# Patient Record
Sex: MALE | Race: BLACK OR AFRICAN AMERICAN | Employment: OTHER | URBAN - NONMETROPOLITAN AREA
[De-identification: names, ages, dates, MRNs, and addresses within clinical notes are randomized per-mention and may not be internally consistent; named-entity substitution may affect disease eponyms.]

---

## 2018-07-19 ENCOUNTER — OFFICE VISIT (OUTPATIENT)
Dept: VASCULAR SURGERY | Age: 63
End: 2018-07-19
Payer: COMMERCIAL

## 2018-07-19 ENCOUNTER — HOSPITAL ENCOUNTER (OUTPATIENT)
Dept: NON INVASIVE DIAGNOSTICS | Age: 63
Discharge: HOME | End: 2018-07-19
Payer: MEDICARE

## 2018-07-19 VITALS
SYSTOLIC BLOOD PRESSURE: 143 MMHG | RESPIRATION RATE: 18 BRPM | DIASTOLIC BLOOD PRESSURE: 79 MMHG | HEART RATE: 76 BPM | TEMPERATURE: 96.5 F

## 2018-07-19 DIAGNOSIS — I73.9 PVD (PERIPHERAL VASCULAR DISEASE) (HCC): Primary | ICD-10-CM

## 2018-07-19 DIAGNOSIS — I73.9 PVD (PERIPHERAL VASCULAR DISEASE) (HCC): ICD-10-CM

## 2018-07-19 DIAGNOSIS — T14.8XXA OPEN WOUND: Primary | ICD-10-CM

## 2018-07-19 PROCEDURE — 93923 UPR/LXTR ART STDY 3+ LVLS: CPT

## 2018-07-19 PROCEDURE — 99203 OFFICE O/P NEW LOW 30 MIN: CPT | Performed by: PHYSICIAN ASSISTANT

## 2018-07-19 RX ORDER — OMEPRAZOLE 20 MG/1
20 CAPSULE, DELAYED RELEASE ORAL DAILY
COMMUNITY

## 2018-07-19 RX ORDER — CIPROFLOXACIN 500 MG/1
500 TABLET, FILM COATED ORAL 2 TIMES DAILY
COMMUNITY
End: 2018-11-28 | Stop reason: CLARIF

## 2018-07-19 RX ORDER — POLYETHYLENE GLYCOL 3350 17 G/17G
17 POWDER, FOR SOLUTION ORAL DAILY
COMMUNITY

## 2018-07-19 RX ORDER — GABAPENTIN 100 MG/1
800 CAPSULE ORAL 3 TIMES DAILY
COMMUNITY

## 2018-07-19 RX ORDER — HYDROCODONE BITARTRATE AND ACETAMINOPHEN 10; 325 MG/1; MG/1
1 TABLET ORAL EVERY 6 HOURS PRN
COMMUNITY

## 2018-07-19 RX ORDER — IBUPROFEN 800 MG/1
800 TABLET ORAL EVERY 6 HOURS PRN
COMMUNITY
End: 2018-11-28 | Stop reason: CLARIF

## 2018-07-19 RX ORDER — SULFAMETHOXAZOLE AND TRIMETHOPRIM 800; 160 MG/1; MG/1
1 TABLET ORAL 2 TIMES DAILY
COMMUNITY
End: 2018-07-31 | Stop reason: ALTCHOICE

## 2018-07-19 RX ORDER — METRONIDAZOLE 500 MG/1
500 TABLET ORAL 3 TIMES DAILY
COMMUNITY
End: 2018-07-31 | Stop reason: ALTCHOICE

## 2018-07-19 RX ORDER — TIZANIDINE 4 MG/1
4 TABLET ORAL EVERY 6 HOURS PRN
COMMUNITY

## 2018-07-19 RX ORDER — DOXYCYCLINE HYCLATE 100 MG/1
100 CAPSULE ORAL 2 TIMES DAILY
COMMUNITY
End: 2018-07-31 | Stop reason: ALTCHOICE

## 2018-07-19 NOTE — PROGRESS NOTES
Patient Care Team:  Kelsea Valladares as PCP - General (Podiatry)      History and Physical    Mr. Kaveh Mckeon is a 59 yo male who was referred to us today by Dr. Jaqueline Acuna for evaluation of PVD. He has a history of DM, hyperlipidemia and HTN. He reports that Dr. Xochitl Arriaga removed his right great toe yesterday. He reports that for some time before this the nail had been lifting and had pus like drainage form under it. He reports pain in the right great toe. Past smoker and still occasionally smokes. Old records have been obtained, reviewed, and summarized. Abram Goldberg is a 58 y.o. male with the following [facility]: There are no active problems to display for this patient. Current Outpatient Prescriptions   Medication Sig Dispense Refill    omeprazole (PRILOSEC) 20 MG delayed release capsule Take 20 mg by mouth daily      HYDROcodone-acetaminophen (NORCO)  MG per tablet Take 1 tablet by mouth every 6 hours as needed for Pain. Patricia Calderon gabapentin (NEURONTIN) 100 MG capsule Take 800 mg by mouth 3 times daily. Lo Mccarty  tiZANidine (ZANAFLEX) 4 MG tablet Take 4 mg by mouth every 6 hours as needed      polyethylene glycol (GLYCOLAX) powder Take 17 g by mouth daily      sulfamethoxazole-trimethoprim (BACTRIM DS;SEPTRA DS) 800-160 MG per tablet Take 1 tablet by mouth 2 times daily      metroNIDAZOLE (FLAGYL) 500 MG tablet Take 500 mg by mouth 3 times daily      ciprofloxacin (CIPRO) 500 MG tablet Take 500 mg by mouth 2 times daily      ibuprofen (ADVIL;MOTRIN) 800 MG tablet Take 800 mg by mouth every 6 hours as needed for Pain      doxycycline hyclate (VIBRAMYCIN) 100 MG capsule Take 100 mg by mouth 2 times daily       No current facility-administered medications for this visit. Allergies: Patient has no known allergies.   Past Medical History:   Diagnosis Date    Acid reflux     Anxiety     Diabetes ([address]     Erectile dysfunction     Hyperlipidemia     Hypertension

## 2018-07-27 ENCOUNTER — TELEPHONE (OUTPATIENT)
Dept: VASCULAR SURGERY | Age: 63
End: 2018-07-27

## 2018-07-27 RX ORDER — INSULIN GLARGINE 100 [IU]/ML
90 INJECTION, SOLUTION SUBCUTANEOUS DAILY
COMMUNITY

## 2018-07-30 ENCOUNTER — PREP FOR PROCEDURE (OUTPATIENT)
Dept: VASCULAR SURGERY | Age: 63
End: 2018-07-30

## 2018-07-30 DIAGNOSIS — I73.9 PVD (PERIPHERAL VASCULAR DISEASE) (HCC): Primary | ICD-10-CM

## 2018-07-30 RX ORDER — ASPIRIN 81 MG/1
81 TABLET ORAL ONCE
Status: CANCELLED | OUTPATIENT
Start: 2018-07-30 | End: 2018-07-30

## 2018-07-30 RX ORDER — SODIUM CHLORIDE 9 MG/ML
INJECTION, SOLUTION INTRAVENOUS CONTINUOUS
Status: CANCELLED | OUTPATIENT
Start: 2018-07-30 | End: 2019-07-30

## 2018-07-30 RX ORDER — SODIUM CHLORIDE 0.9 % (FLUSH) 0.9 %
10 SYRINGE (ML) INJECTION PRN
Status: CANCELLED | OUTPATIENT
Start: 2018-07-30 | End: 2019-07-30

## 2018-07-30 NOTE — H&P
Progress Notes  Encounter Date: 7/19/2018  Ishan Abad PA-C   Vascular Surgery   Expand All Collapse All    []Manual[]Template  []Copied  Patient Care Team:  Sierra Brooks as PCP - General (Podiatry)        History and Physical     Mr. Rena Gonsalez is a 59 yo male who was referred to us today by Dr. Ochoa Franco for evaluation of PVD. He has a history of DM, hyperlipidemia and HTN. He reports that Dr. Duard Sicard removed his right great toe yesterday. He reports that for some time before this the nail had been lifting and had pus like drainage form under it. He reports pain in the right great toe. Past smoker and still occasionally smokes. Patient had a BMP on 6/28/18 which showed his creatinine and GFR were normal.      Old records have been obtained, reviewed, and summarized.     Janelle Rabago is a 58 y.o. male with the following history reviewed and recorded in [facility]: There are no active problems to display for this patient.     Current Facility-Administered Medications          Current Outpatient Prescriptions   Medication Sig Dispense Refill    omeprazole (PRILOSEC) 20 MG delayed release capsule Take 20 mg by mouth daily        HYDROcodone-acetaminophen (NORCO)  MG per tablet Take 1 tablet by mouth every 6 hours as needed for Pain. .        gabapentin (NEURONTIN) 100 MG capsule Take 800 mg by mouth 3 times daily. .        tiZANidine (ZANAFLEX) 4 MG tablet Take 4 mg by mouth every 6 hours as needed        polyethylene glycol (GLYCOLAX) powder Take 17 g by mouth daily        sulfamethoxazole-trimethoprim (BACTRIM DS;SEPTRA DS) 800-160 MG per tablet Take 1 tablet by mouth 2 times daily        metroNIDAZOLE (FLAGYL) 500 MG tablet Take 500 mg by mouth 3 times daily        ciprofloxacin (CIPRO) 500 MG tablet Take 500 mg by mouth 2 times daily        ibuprofen (ADVIL;MOTRIN) 800 MG tablet Take 800 mg by mouth every 6 hours as needed for Pain        doxycycline hyclate (VIBRAMYCIN) 100 MG capsule Take 100 mg by mouth 2 times daily          No current facility-administered medications for this visit.          Allergies: Patient has no known allergies. Past Medical History        Past Medical History:   Diagnosis Date    Acid reflux      Anxiety      Diabetes (HCC)      Erectile dysfunction      Hyperlipidemia      Hypertension      Restless leg syndrome      Sleep apnea       the c-pap went out on him couldnot get another one         Past Surgical History   History reviewed. No pertinent surgical history. Family History         Family History   Problem Relation Age of Onset   [facility] Cancer Mother      Cancer Father                  Social History   Substance Use Topics    Smoking status: Current Some Day Smoker       Packs/day: 3.00    Smokeless tobacco: Never Used         Comment: smokes 3 packs per week    Alcohol use No         Review of Systems     Constitutional - no significant activity change, appetite change, or unexpected weight change. No fever or chills. No diaphoresis or significant fatigue. HENT - no significant rhinorrhea or epistaxis. No tinnitus or significant hearing loss. Eyes - no sudden vision change or amaurosis. Respiratory - no significant shortness of breath, wheezing, or stridor. No apnea, cough, or chest tightness associated with shortness of breath. Cardiovascular - no chest pain, syncope, or significant dizziness. No palpitations or significant leg swelling. Patient reports pain right great toe. Gastrointestinal - no abdominal swelling or pain. No blood in stool. No severe constipation, diarrhea, nausea, or vomiting. Genitourinary - No difficulty urinating, dysuria, frequency, or urgency. No flank pain or hematuria. Musculoskeletal - no back pain, gait disturbance, or myalgia. Skin - no color change, rash, pallor, or new wound. Neurologic - no dizziness, facial asymmetry, or light headedness. No seizures.   No speech difficulty or lateralizing weakness. Hematologic - no easy bruising or excessive bleeding. Psychiatric - no severe anxiety or nervousness. No confusion. All other review of systems are negative.        Physical Exam     BP (!) 143/79 Comment: left  Pulse 76   Temp 96.5 °F (35.8 °C)   Resp 18      Constitutional - well developed, well nourished. No diaphoresis or acute distress. HENT - head normocephalic. Right external ear canal appears normal.  Left external ear canal appears normal.  Septum appears midline. Eyes - conjunctiva normal.  EOMS normal.  No exudate. No icterus. Neck- ROM appears normal, no tracheal deviation. Cardiovascular - Regular rate and rhythm. Heart sounds are normal.  No murmur, rub, or gallop. Carotid pulses are 2+ to palpation bilaterally without bruit. Extremities - Radial and brachial pulses are 2+ to palpation bilaterally. DP and PT pulses are not palpable. No cyanosis, clubbing, or significant edema. No signs atheroembolic event. Pulmonary - effort appears normal.  No respiratory distress. Lungs - Breath sounds normal. No wheezes or rales. GI - Abdomen - soft, non tender, bowel sounds X 4 quadrants. No guarding or rebound tenderness. No distension or palpable mass. Genitourinary - deferred. Musculoskeletal - ROM appears normal.  No significant edema. Neurologic - alert and oriented X 3. Physiologic. Skin - warm, dry, and intact. No rash, erythema, or pallor. Wound right nail bed (nail recently removed)  Psychiatric - mood, affect, and behavior appear normal.  Judgment and thought processes appear normal.     Risk factors for atherosclerosis of all vascular beds have been reviewed with the patient including:  Family history, tobacco abuse in all forms, elevated cholesterol, hyperlipidemia, and diabetes.        Assessment     1. Open wound     2.     Atherosclerosis of native arteries of both lower extremities        Plan     Start ASA EC 81 mg daily  Strongly encourage  statin therapy  Good skin care/checks daily  Recommend no smoking   Continue local wound care per Dr. Kevin Omalley  We will obtain a CAT with toe pressures today        Addendum:  Lower extremity arterial study:    Impression        The patient has severely diminished ankle-brachial indices bilateral lower    extremity(ies) which would be consistent with rest pain symptoms.    Based on segmental pressures and doppler waveforms, the patient likely has    disease in the right superficial femoral, tibial arterial segments.    Based on segmental pressures and doppler waveforms, the patient likely has    disease in the bilateral popliteal, tibial arterial segments.      Right YOHANNES 0.4, toe pressure 0,  Left YOHANNES 0.54, toe pressure, 0.4  Individual films reviewed: Yes. Test results were reviewed with the patient.     LM to call regarding the results of his CAT and further recommendation.     Addendum:  Spoke with the patient via telephone regarding the results of his CAT. Options were discussed with the patient including continued medical management versus proceeding with angiography and potential intervention for PVD with wound. Patient has opted to proceed with angiography.   Risks have been discussed with the patient including but not limited to MI, death, CVA, bleed, nerve injury, infection, contrast nephropathy, possible need for dialysis, and need for further surgery.      Proceed with aortogram with runoff possible angioplasty/atherectomy/stent                 Office Visit on 7/19/2018            Revision History            Detailed Report           Note shared with patient   Progress Notes Info     Author Note Status Last Update User   AlRoxanna Brown PA-C Addendum Jeffry Abad PA-C   Last Update Date/Time: 7/27/2018  2:43 PM   Chart Review Routing History     No Routing History on File

## 2018-07-31 ENCOUNTER — HOSPITAL ENCOUNTER (OUTPATIENT)
Dept: INTERVENTIONAL RADIOLOGY/VASCULAR | Age: 63
Discharge: HOME | End: 2018-07-31
Payer: MEDICARE

## 2018-07-31 VITALS
TEMPERATURE: 98.4 F | HEART RATE: 70 BPM | OXYGEN SATURATION: 96 % | DIASTOLIC BLOOD PRESSURE: 72 MMHG | WEIGHT: 262 LBS | SYSTOLIC BLOOD PRESSURE: 160 MMHG | BODY MASS INDEX: 37.51 KG/M2 | RESPIRATION RATE: 18 BRPM | HEIGHT: 70 IN

## 2018-07-31 DIAGNOSIS — I73.9 PVD (PERIPHERAL VASCULAR DISEASE) (HCC): ICD-10-CM

## 2018-07-31 LAB
ANION GAP SERPL CALCULATED.3IONS-SCNC: 13 MMOL/L (ref 7–19)
BUN BLDV-MCNC: 14 MG/DL (ref 8–23)
CALCIUM SERPL-MCNC: 8.6 MG/DL (ref 8.8–10.2)
CHLORIDE BLD-SCNC: 105 MMOL/L (ref 98–111)
CO2: 23 MMOL/L (ref 22–29)
CREAT SERPL-MCNC: 0.7 MG/DL (ref 0.5–1.2)
GFR NON-AFRICAN AMERICAN: >60
GLUCOSE BLD-MCNC: 237 MG/DL (ref 74–109)
HCT VFR BLD CALC: 38.6 % (ref 42–52)
HEMOGLOBIN: 13 G/DL (ref 14–18)
MCH RBC QN AUTO: 30.7 PG (ref 27–31)
MCHC RBC AUTO-ENTMCNC: 33.7 G/DL (ref 33–37)
MCV RBC AUTO: 91.3 FL (ref 80–94)
PDW BLD-RTO: 12.4 % (ref 11.5–14.5)
PLATELET # BLD: 179 K/UL (ref 130–400)
PMV BLD AUTO: 10.4 FL (ref 9.4–12.4)
POTASSIUM SERPL-SCNC: 3.6 MMOL/L (ref 3.5–5)
RBC # BLD: 4.23 M/UL (ref 4.7–6.1)
SODIUM BLD-SCNC: 141 MMOL/L (ref 136–145)
WBC # BLD: 9.1 K/UL (ref 4.8–10.8)

## 2018-07-31 PROCEDURE — 6370000000 HC RX 637 (ALT 250 FOR IP): Performed by: PHYSICIAN ASSISTANT

## 2018-07-31 PROCEDURE — 75774 ARTERY X-RAY EACH VESSEL: CPT | Performed by: SURGERY

## 2018-07-31 PROCEDURE — 75625 CONTRAST EXAM ABDOMINL AORTA: CPT | Performed by: SURGERY

## 2018-07-31 PROCEDURE — 36415 COLL VENOUS BLD VENIPUNCTURE: CPT

## 2018-07-31 PROCEDURE — 75716 ARTERY X-RAYS ARMS/LEGS: CPT

## 2018-07-31 PROCEDURE — 6370000000 HC RX 637 (ALT 250 FOR IP): Performed by: SURGERY

## 2018-07-31 PROCEDURE — 6360000002 HC RX W HCPCS: Performed by: SURGERY

## 2018-07-31 PROCEDURE — 80048 BASIC METABOLIC PNL TOTAL CA: CPT

## 2018-07-31 PROCEDURE — 75625 CONTRAST EXAM ABDOMINL AORTA: CPT

## 2018-07-31 PROCEDURE — 99152 MOD SED SAME PHYS/QHP 5/>YRS: CPT

## 2018-07-31 PROCEDURE — 2580000003 HC RX 258: Performed by: PHYSICIAN ASSISTANT

## 2018-07-31 PROCEDURE — 75716 ARTERY X-RAYS ARMS/LEGS: CPT | Performed by: SURGERY

## 2018-07-31 PROCEDURE — 2500000003 HC RX 250 WO HCPCS: Performed by: SURGERY

## 2018-07-31 PROCEDURE — 37226 HC FEMPOP PLASTY & STENT: CPT

## 2018-07-31 PROCEDURE — 6360000004 HC RX CONTRAST MEDICATION: Performed by: SURGERY

## 2018-07-31 PROCEDURE — C1894 INTRO/SHEATH, NON-LASER: HCPCS

## 2018-07-31 PROCEDURE — 99153 MOD SED SAME PHYS/QHP EA: CPT

## 2018-07-31 PROCEDURE — 37226 PR REVSC OPN/PRQ FEM/POP W/STNT/ANGIOP SM VSL: CPT | Performed by: SURGERY

## 2018-07-31 PROCEDURE — 85027 COMPLETE CBC AUTOMATED: CPT

## 2018-07-31 PROCEDURE — 6360000002 HC RX W HCPCS: Performed by: PHYSICIAN ASSISTANT

## 2018-07-31 RX ORDER — ASPIRIN 81 MG/1
81 TABLET ORAL ONCE
Status: COMPLETED | OUTPATIENT
Start: 2018-07-31 | End: 2018-07-31

## 2018-07-31 RX ORDER — ACETAMINOPHEN 325 MG/1
650 TABLET ORAL EVERY 4 HOURS PRN
Status: DISCONTINUED | OUTPATIENT
Start: 2018-07-31 | End: 2018-08-02 | Stop reason: HOSPADM

## 2018-07-31 RX ORDER — CLOPIDOGREL BISULFATE 75 MG/1
75 TABLET ORAL DAILY
Status: DISCONTINUED | OUTPATIENT
Start: 2018-07-31 | End: 2018-08-02 | Stop reason: HOSPADM

## 2018-07-31 RX ORDER — FENTANYL CITRATE 50 UG/ML
INJECTION, SOLUTION INTRAMUSCULAR; INTRAVENOUS
Status: COMPLETED | OUTPATIENT
Start: 2018-07-31 | End: 2018-07-31

## 2018-07-31 RX ORDER — MIDAZOLAM HYDROCHLORIDE 1 MG/ML
INJECTION INTRAMUSCULAR; INTRAVENOUS
Status: COMPLETED | OUTPATIENT
Start: 2018-07-31 | End: 2018-07-31

## 2018-07-31 RX ORDER — HEPARIN SODIUM 5000 [USP'U]/ML
INJECTION, SOLUTION INTRAVENOUS; SUBCUTANEOUS
Status: COMPLETED | OUTPATIENT
Start: 2018-07-31 | End: 2018-07-31

## 2018-07-31 RX ORDER — IODIXANOL 320 MG/ML
INJECTION, SOLUTION INTRAVASCULAR
Status: COMPLETED | OUTPATIENT
Start: 2018-07-31 | End: 2018-07-31

## 2018-07-31 RX ORDER — SODIUM CHLORIDE 9 MG/ML
INJECTION, SOLUTION INTRAVENOUS CONTINUOUS
Status: DISCONTINUED | OUTPATIENT
Start: 2018-07-31 | End: 2018-08-02 | Stop reason: HOSPADM

## 2018-07-31 RX ORDER — LISINOPRIL 10 MG/1
10 TABLET ORAL 2 TIMES DAILY
COMMUNITY

## 2018-07-31 RX ORDER — ONDANSETRON 2 MG/ML
4 INJECTION INTRAMUSCULAR; INTRAVENOUS EVERY 8 HOURS PRN
Status: DISCONTINUED | OUTPATIENT
Start: 2018-07-31 | End: 2018-08-02 | Stop reason: HOSPADM

## 2018-07-31 RX ORDER — SODIUM CHLORIDE 9 MG/ML
INJECTION, SOLUTION INTRAVENOUS CONTINUOUS
Status: DISCONTINUED | OUTPATIENT
Start: 2018-07-31 | End: 2018-07-31 | Stop reason: SDUPTHER

## 2018-07-31 RX ORDER — SODIUM CHLORIDE 0.9 % (FLUSH) 0.9 %
10 SYRINGE (ML) INJECTION PRN
Status: DISCONTINUED | OUTPATIENT
Start: 2018-07-31 | End: 2018-08-02 | Stop reason: HOSPADM

## 2018-07-31 RX ORDER — HYDROCODONE BITARTRATE AND ACETAMINOPHEN 5; 325 MG/1; MG/1
1 TABLET ORAL EVERY 4 HOURS PRN
Status: DISCONTINUED | OUTPATIENT
Start: 2018-07-31 | End: 2018-08-02 | Stop reason: HOSPADM

## 2018-07-31 RX ORDER — HYDROCODONE BITARTRATE AND ACETAMINOPHEN 5; 325 MG/1; MG/1
2 TABLET ORAL EVERY 4 HOURS PRN
Status: DISCONTINUED | OUTPATIENT
Start: 2018-07-31 | End: 2018-08-02 | Stop reason: HOSPADM

## 2018-07-31 RX ORDER — AMLODIPINE BESYLATE 5 MG/1
5 TABLET ORAL DAILY
COMMUNITY

## 2018-07-31 RX ORDER — LIDOCAINE HYDROCHLORIDE 20 MG/ML
INJECTION, SOLUTION INFILTRATION; PERINEURAL
Status: COMPLETED | OUTPATIENT
Start: 2018-07-31 | End: 2018-07-31

## 2018-07-31 RX ADMIN — IODIXANOL 150 ML: 320 INJECTION, SOLUTION INTRAVASCULAR at 09:29

## 2018-07-31 RX ADMIN — FENTANYL CITRATE 25 MCG: 50 INJECTION INTRAMUSCULAR; INTRAVENOUS at 08:22

## 2018-07-31 RX ADMIN — MIDAZOLAM 1 MG: 1 INJECTION INTRAMUSCULAR; INTRAVENOUS at 08:37

## 2018-07-31 RX ADMIN — ASPIRIN 81 MG: 81 TABLET, COATED ORAL at 07:30

## 2018-07-31 RX ADMIN — MIDAZOLAM 1 MG: 1 INJECTION INTRAMUSCULAR; INTRAVENOUS at 08:58

## 2018-07-31 RX ADMIN — SODIUM CHLORIDE: 9 INJECTION, SOLUTION INTRAVENOUS at 07:23

## 2018-07-31 RX ADMIN — MIDAZOLAM 1 MG: 1 INJECTION INTRAMUSCULAR; INTRAVENOUS at 08:22

## 2018-07-31 RX ADMIN — HEPARIN SODIUM 5000 UNITS: 5000 INJECTION, SOLUTION INTRAVENOUS; SUBCUTANEOUS at 08:14

## 2018-07-31 RX ADMIN — FENTANYL CITRATE 25 MCG: 50 INJECTION INTRAMUSCULAR; INTRAVENOUS at 08:14

## 2018-07-31 RX ADMIN — HEPARIN SODIUM 5000 UNITS: 5000 INJECTION, SOLUTION INTRAVENOUS; SUBCUTANEOUS at 08:35

## 2018-07-31 RX ADMIN — LIDOCAINE HYDROCHLORIDE 10 ML: 20 INJECTION, SOLUTION INFILTRATION; PERINEURAL at 08:17

## 2018-07-31 RX ADMIN — MIDAZOLAM 1 MG: 1 INJECTION INTRAMUSCULAR; INTRAVENOUS at 08:13

## 2018-07-31 RX ADMIN — FENTANYL CITRATE 25 MCG: 50 INJECTION INTRAMUSCULAR; INTRAVENOUS at 08:37

## 2018-07-31 RX ADMIN — FENTANYL CITRATE 25 MCG: 50 INJECTION INTRAMUSCULAR; INTRAVENOUS at 08:58

## 2018-07-31 RX ADMIN — Medication 2 G: at 08:01

## 2018-07-31 RX ADMIN — CLOPIDOGREL BISULFATE 75 MG: 75 TABLET ORAL at 14:11

## 2018-07-31 NOTE — H&P (VIEW-ONLY)
100 mg by mouth 2 times daily          No current facility-administered medications for this visit.          Allergies: Patient has no known allergies. Past Medical History        Past Medical History:   Diagnosis Date    Acid reflux      Anxiety      Diabetes (HCC)      Erectile dysfunction      Hyperlipidemia      Hypertension      Restless leg syndrome      Sleep apnea       the c-pap went out on him couldnot get another one         Past Surgical History   History reviewed. No pertinent surgical history. Family History         Family History   Problem Relation Age of Onset   [facility] Cancer Mother      Cancer Father                  Social History   Substance Use Topics    Smoking status: Current Some Day Smoker       Packs/day: 3.00    Smokeless tobacco: Never Used         Comment: smokes 3 packs per week    Alcohol use No         Review of Systems     Constitutional  no significant activity change, appetite change, or unexpected weight change. No fever or chills. No diaphoresis or significant fatigue. HENT  no significant rhinorrhea or epistaxis. No tinnitus or significant hearing loss. Eyes  no sudden vision change or amaurosis. Respiratory  no significant shortness of breath, wheezing, or stridor. No apnea, cough, or chest tightness associated with shortness of breath. Cardiovascular  no chest pain, syncope, or significant dizziness. No palpitations or significant leg swelling. Patient reports pain right great toe. Gastrointestinal  no abdominal swelling or pain. No blood in stool. No severe constipation, diarrhea, nausea, or vomiting. Genitourinary  No difficulty urinating, dysuria, frequency, or urgency. No flank pain or hematuria. Musculoskeletal  no back pain, gait disturbance, or myalgia. Skin  no color change, rash, pallor, or new wound. Neurologic  no dizziness, facial asymmetry, or light headedness. No seizures.   No speech difficulty or lateralizing therapy  Good skin care/checks daily  Recommend no smoking   Continue local wound care per Dr. Ran Drew  We will obtain a CAT with toe pressures today        Addendum:  Lower extremity arterial study:    Impression        The patient has severely diminished ankle-brachial indices bilateral lower    extremity(ies) which would be consistent with rest pain symptoms.    Based on segmental pressures and doppler waveforms, the patient likely has    disease in the right superficial femoral, tibial arterial segments.    Based on segmental pressures and doppler waveforms, the patient likely has    disease in the bilateral popliteal, tibial arterial segments.      Right YOHANNES 0.4, toe pressure 0,  Left YOHANNES 0.54, toe pressure, 0.4  Individual films reviewed: Yes. Test results were reviewed with the patient.     LM to call regarding the results of his CAT and further recommendation.     Addendum:  Spoke with the patient via telephone regarding the results of his CAT. Options were discussed with the patient including continued medical management versus proceeding with angiography and potential intervention for PVD with wound. Patient has opted to proceed with angiography.   Risks have been discussed with the patient including but not limited to MI, death, CVA, bleed, nerve injury, infection, contrast nephropathy, possible need for dialysis, and need for further surgery.      Proceed with aortogram with runoff possible angioplasty/atherectomy/stent                 Office Visit on 7/19/2018            Revision History            Detailed Report           Note shared with patient   Progress Notes Info     Author Note Status Last Update User   AlRoxanna Brown PA-C Addendum Rajesh Abad PA-C   Last Update Date/Time: 7/27/2018  2:43 PM   Chart Review Routing History     No Routing History on File

## 2018-08-01 NOTE — OP NOTE
[facility] [address] [facility]                                 OPERATIVE REPORT    PATIENT NAME: Maria E Carrillo                        :        1955  MED REC NO:   [id number]                              ROOM:  ACCOUNT NO:   [de-identified]                           ADMIT DATE: 2018  PROVIDER:     Freedom Richardson MD    DATE OF PROCEDURE:  2018    PREOPERATIVE DIAGNOSIS:  Ischemic rest pain right lower extremity,  bilateral peripheral vascular disease. POSTOPERATIVE DIAGNOSIS:  Ischemic rest pain right lower extremity,  bilateral peripheral vascular disease. PROCEDURES:  1. Ultrasound-guided access of the left common femoral artery. 2.  Aortoiliac angiography. 3.  Bilateral lower extremity runoff. 4.  Crossing of chronic total occlusion of the right superficial femoral  with balloon angioplasty and stenting with a 6 x 150 Innova stent and  drug-coated balloon of the same size. 5.  Selective cannulation of the popliteal artery and angiography. 6.  Mynx closure, left common femoral artery. 7.  Conscious sedation time is 1 hour. SURGEON:  Freedom Richardson MD    ANESTHESIA:  Conscious sedation, ASA class II, 4 mg of Versed and 100 mcg  of fentanyl were used. FLUOROSCOPY TIME:  22 minutes. CONTRAST:  150 mL of Visipaque. FINDINGS:  Are as follows; infrarenal aorta is normal.  Bilateral renal  arteries are normal.  Bilateral common iliacs and external iliacs are  normal.  Both internal iliacs are fairly small with some disease. Runoff  to the legs is as follows; the right lower extremity, the common femoral,  proximal, superficial and deep femoral are relatively normal.  The deep  femoral does have significant disease within it. There is an occlusion of  the superficial femoral approximately 2 cm past its origin with  reconstitution approximately 10 cm down, it is approximately a 10 cm  occlusion.   Going down the

## 2018-08-15 ENCOUNTER — OFFICE VISIT (OUTPATIENT)
Dept: VASCULAR SURGERY | Age: 63
End: 2018-08-15
Payer: MEDICARE

## 2018-08-15 VITALS
SYSTOLIC BLOOD PRESSURE: 146 MMHG | HEART RATE: 73 BPM | TEMPERATURE: 97.7 F | OXYGEN SATURATION: 97 % | DIASTOLIC BLOOD PRESSURE: 84 MMHG | RESPIRATION RATE: 20 BRPM

## 2018-08-15 DIAGNOSIS — I73.9 PVD (PERIPHERAL VASCULAR DISEASE) (HCC): Primary | ICD-10-CM

## 2018-08-15 PROCEDURE — 4004F PT TOBACCO SCREEN RCVD TLK: CPT | Performed by: PHYSICIAN ASSISTANT

## 2018-08-15 PROCEDURE — G8419 CALC BMI OUT NRM PARAM NOF/U: HCPCS | Performed by: PHYSICIAN ASSISTANT

## 2018-08-15 PROCEDURE — G8427 DOCREV CUR MEDS BY ELIG CLIN: HCPCS | Performed by: PHYSICIAN ASSISTANT

## 2018-08-15 PROCEDURE — 3017F COLORECTAL CA SCREEN DOC REV: CPT | Performed by: PHYSICIAN ASSISTANT

## 2018-08-15 PROCEDURE — 99212 OFFICE O/P EST SF 10 MIN: CPT | Performed by: PHYSICIAN ASSISTANT

## 2018-08-15 NOTE — PROGRESS NOTES
Patient Care Team:  Sandeep Carcamo as PCP - General (Podiatry)    Mr. Christiano Chapman  presents for follow up after an Arteriogram with Crossing of chronic total occlusion of the right superficial femoral with balloon angioplasty and stenting with a 6 x 150 Innova stent and drug-coated balloon of the same size. done on 7/31/18 by Dr. Nico Jose. Procedure was done for peripheral vascular disease with claudication. Post op problems include none. Angiogram complications were none. Post op pain and swelling are minimal.  He reports that he can tell that his right leg already feels better. Sarwat Em is a 58 y.o. male with the [facility]:  Patient Active Problem List    Diagnosis Date Noted    [facility] 07/30/2018     Current Outpatient Prescriptions   Medication Sig Dispense Refill    amLODIPine (NORVASC) 5 MG tablet Take 5 mg by mouth daily      lisinopril (PRINIVIL;ZESTRIL) 10 MG tablet Take 10 mg by mouth 2 times daily      aspirin 81 MG tablet Take 81 mg by mouth daily      insulin lispro (HUMALOG) 100 UNIT/ML injection cartridge Inject 5 Units into the skin 2 times daily as needed for High Blood Sugar      insulin glargine (LANTUS) 100 UNIT/ML injection vial Inject 90 Units into the skin daily       omeprazole (PRILOSEC) 20 MG delayed release capsule Take 20 mg by mouth daily      HYDROcodone-acetaminophen (NORCO)  MG per tablet Take 1 tablet by mouth every 6 hours as needed for Pain. Amrit Hernandez gabapentin (NEURONTIN) 100 MG capsule Take 800 mg by mouth 3 times daily. Cristhian Anthony       tiZANidine (ZANAFLEX) 4 MG tablet Take 4 mg by mouth every 6 hours as needed      polyethylene glycol (GLYCOLAX) powder Take 17 g by mouth daily      ciprofloxacin (CIPRO) 500 MG tablet Take 500 mg by mouth 2 times daily      ibuprofen (ADVIL;MOTRIN) 800 MG tablet Take 800 mg by mouth every 6 hours as needed for Pain       No current facility-administered Sitting, Cuff Size: Large Adult)   Pulse 73   Temp 97.7 °F (36.5 °C)   Resp 20   SpO2 97%     Constitutional  obese, No diaphoresis or acute distress. HENT  head normocephalic. Right external ear canal appears normal.  Left external ear canal appears normal.  Septum appears midline. Eyes  conjunctiva normal.  EOMS normal.  No exudate. No icterus. Neck- ROM appears normal, no tracheal deviation. Cardiovascular  Regular rate and rhythm. Heart sounds are normal.  No murmur, rub, or gallop. Carotid pulses are 2+ to palpation bilaterally without bruit. Extremities - Radial and brachial pulses are 2+ to palpation bilaterally. Femoral pulses are palpable. Right DP and PT bilaterally with 2+ doppler signals present. Left DP with 1+ DS. Feet are warm. No cyanosis, clubbing, or significant edema. No signs atheroembolic event. Left groin without significant ecchymosis or pulsatile mass to suggest pseudoaneurysm, AV fistula, or significant hematoma formation. Pulmonary  effort appears normal.  Breath sounds normal.  No respiratory distress. No wheezes or rales. Abdomen  soft, non tender, bowel sounds X 4 quadrants. No guarding or rebound tenderness. No distension or palpable mass. Genitourinary  deferred. Musculoskeletal  ROM appears normal.  No significant edema. Neurologic  alert and oriented X 3. Physiologic. Skin  warm, dry, and intact. No rash, erythema, or pallor. Psychiatric  mood, affect, and behavior appear normal.  Judgment and thought processes appear normal.    Risk factors for atherosclerosis of all vascular beds have been reviewed with the patient including:  Family history, tobacco abuse in all forms, elevated cholesterol, hyperlipidemia, and diabetes. Assessment    1.  PVD (peripheral vascular disease) (HCC)        Plan      Continue ASA EC 81 mg daily  Continue Plavix 75 mg daily  Strongly encourage statin therapy  Good moisturization  Good skin care  Instructed him tho call as much possible  Recommend no smoking   Follow up 3 months with a right A'scan with YOHANNES's or sooner if claudication worsens/develops, develops new wound or IRP        Lena Dillon

## 2018-11-28 ENCOUNTER — OFFICE VISIT (OUTPATIENT)
Dept: VASCULAR SURGERY | Age: 63
End: 2018-11-28
Payer: MEDICARE

## 2018-11-28 ENCOUNTER — HOSPITAL ENCOUNTER (OUTPATIENT)
Dept: VASCULAR LAB | Age: 63
Discharge: HOME | End: 2018-11-28
Payer: MEDICARE

## 2018-11-28 VITALS
RESPIRATION RATE: 18 BRPM | OXYGEN SATURATION: 100 % | TEMPERATURE: 96.4 F | DIASTOLIC BLOOD PRESSURE: 79 MMHG | SYSTOLIC BLOOD PRESSURE: 137 MMHG | HEART RATE: 69 BPM

## 2018-11-28 DIAGNOSIS — G62.9 NEUROPATHY: ICD-10-CM

## 2018-11-28 DIAGNOSIS — I73.9 PVD (PERIPHERAL VASCULAR DISEASE) (HCC): Primary | ICD-10-CM

## 2018-11-28 DIAGNOSIS — I73.9 PVD (PERIPHERAL VASCULAR DISEASE) (HCC): ICD-10-CM

## 2018-11-28 PROCEDURE — 99213 OFFICE O/P EST LOW 20 MIN: CPT | Performed by: PHYSICIAN ASSISTANT

## 2018-11-28 PROCEDURE — 93922 UPR/L XTREMITY ART 2 LEVELS: CPT

## 2018-11-28 PROCEDURE — G8484 FLU IMMUNIZE NO ADMIN: HCPCS | Performed by: PHYSICIAN ASSISTANT

## 2018-11-28 PROCEDURE — 3017F COLORECTAL CA SCREEN DOC REV: CPT | Performed by: PHYSICIAN ASSISTANT

## 2018-11-28 PROCEDURE — 4004F PT TOBACCO SCREEN RCVD TLK: CPT | Performed by: PHYSICIAN ASSISTANT

## 2018-11-28 PROCEDURE — 93926 LOWER EXTREMITY STUDY: CPT

## 2018-11-28 PROCEDURE — G8427 DOCREV CUR MEDS BY ELIG CLIN: HCPCS | Performed by: PHYSICIAN ASSISTANT

## 2018-11-28 PROCEDURE — G8417 CALC BMI ABV UP PARAM F/U: HCPCS | Performed by: PHYSICIAN ASSISTANT

## 2018-11-28 RX ORDER — CLOPIDOGREL BISULFATE 75 MG/1
75 TABLET ORAL DAILY
COMMUNITY
End: 2018-11-28 | Stop reason: SDUPTHER

## 2018-11-28 RX ORDER — CLOPIDOGREL BISULFATE 75 MG/1
75 TABLET ORAL DAILY
Qty: 30 TABLET | Refills: 5 | Status: SHIPPED | OUTPATIENT
Start: 2018-11-28 | End: 2019-08-06 | Stop reason: SDUPTHER

## 2019-08-06 ENCOUNTER — TELEPHONE (OUTPATIENT)
Dept: VASCULAR SURGERY | Age: 64
End: 2019-08-06

## 2019-08-06 DIAGNOSIS — I73.9 PVD (PERIPHERAL VASCULAR DISEASE) (HCC): Primary | ICD-10-CM

## 2019-08-06 RX ORDER — CLOPIDOGREL BISULFATE 75 MG/1
75 TABLET ORAL DAILY
Qty: 30 TABLET | Refills: 1 | Status: SHIPPED | OUTPATIENT
Start: 2019-08-06

## 2020-06-17 ENCOUNTER — TRANSCRIBE ORDERS (OUTPATIENT)
Dept: ADMINISTRATIVE | Facility: HOSPITAL | Age: 65
End: 2020-06-17

## 2020-06-17 DIAGNOSIS — Z01.818 PREOP TESTING: Primary | ICD-10-CM

## 2020-09-21 ENCOUNTER — TRANSCRIBE ORDERS (OUTPATIENT)
Dept: ADMINISTRATIVE | Facility: HOSPITAL | Age: 65
End: 2020-09-21

## 2020-09-21 DIAGNOSIS — Z01.818 PRE-OP TESTING: Primary | ICD-10-CM

## 2020-09-25 ENCOUNTER — OFFICE VISIT (OUTPATIENT)
Age: 65
End: 2020-09-25

## 2020-09-25 VITALS — TEMPERATURE: 97.2 F | HEART RATE: 78 BPM | OXYGEN SATURATION: 98 %

## 2020-09-25 NOTE — LETTER
[address] [address]  Phone: [phone]  Fax: [phone]    ASHLEY Hart CNP        September 29, 2020    [place] [address] [address]      Dear Katrina Olvera: Our office has been trying to contact you in regards to your most recent test results. Please contact us at your earliest convenience. Thank you. If you have any questions or concerns, please don't hesitate to call.     Sincerely,        ASHLEY Hart CNP

## 2020-09-26 LAB — SARS-COV-2, NAA: NOT DETECTED

## 2022-03-28 ENCOUNTER — TELEPHONE (OUTPATIENT)
Dept: NEUROSURGERY | Facility: CLINIC | Age: 67
End: 2022-03-28

## 2022-03-28 NOTE — TELEPHONE ENCOUNTER
Spoke with PCP Dinora ferreira [facility]. She gave me the number of [phone] for the patient. I attempted to call that number, voicemail not set up.

## 2022-04-20 ENCOUNTER — OFFICE VISIT (OUTPATIENT)
Dept: NEUROSURGERY | Facility: CLINIC | Age: 67
End: 2022-04-20

## 2022-04-20 VITALS — WEIGHT: 284 LBS | BODY MASS INDEX: 40.66 KG/M2 | HEIGHT: 70 IN

## 2022-04-20 DIAGNOSIS — D62 POSTOPERATIVE ANEMIA DUE TO ACUTE BLOOD LOSS: ICD-10-CM

## 2022-04-20 DIAGNOSIS — E66.01 OBESITY, MORBID, BMI 40.0-49.9: ICD-10-CM

## 2022-04-20 DIAGNOSIS — E08.44 DIABETES MELLITUS DUE TO UNDERLYING CONDITION WITH DIABETIC AMYOTROPHY, WITH LONG-TERM CURRENT USE OF INSULIN: ICD-10-CM

## 2022-04-20 DIAGNOSIS — F17.200 SMOKING: ICD-10-CM

## 2022-04-20 DIAGNOSIS — Z79.4 DIABETES MELLITUS DUE TO UNDERLYING CONDITION WITH DIABETIC AMYOTROPHY, WITH LONG-TERM CURRENT USE OF INSULIN: ICD-10-CM

## 2022-04-20 DIAGNOSIS — D32.9 MENINGIOMA: Primary | ICD-10-CM

## 2022-04-20 PROCEDURE — 99205 OFFICE O/P NEW HI 60 MIN: CPT | Performed by: NEUROLOGICAL SURGERY

## 2022-04-20 RX ORDER — LEVETIRACETAM 500 MG/1
500 TABLET ORAL 2 TIMES DAILY
Qty: 60 TABLET | Refills: 0 | Status: SHIPPED | OUTPATIENT
Start: 2022-04-20 | End: 2022-07-11 | Stop reason: HOSPADM

## 2022-04-20 RX ORDER — INSULIN GLARGINE 100 [IU]/ML
55 INJECTION, SOLUTION SUBCUTANEOUS DAILY
COMMUNITY
Start: 2022-02-23 | End: 2022-07-11 | Stop reason: HOSPADM

## 2022-04-20 RX ORDER — LISINOPRIL 40 MG/1
40 TABLET ORAL DAILY
COMMUNITY
Start: 2022-03-28 | End: 2022-07-11 | Stop reason: HOSPADM

## 2022-04-20 RX ORDER — OMEPRAZOLE 20 MG/1
20 CAPSULE, DELAYED RELEASE ORAL DAILY
COMMUNITY
End: 2022-07-11 | Stop reason: HOSPADM

## 2022-04-20 RX ORDER — ATORVASTATIN CALCIUM 20 MG/1
20 TABLET, FILM COATED ORAL NIGHTLY
Status: ON HOLD | COMMUNITY
Start: 2022-02-08

## 2022-04-20 RX ORDER — AMLODIPINE BESYLATE 10 MG/1
10 TABLET ORAL DAILY
COMMUNITY
Start: 2022-01-18 | End: 2022-06-25 | Stop reason: HOSPADM

## 2022-04-20 RX ORDER — CHLORHEXIDINE GLUCONATE 4 G/100ML
SOLUTION TOPICAL
Qty: 120 ML | Refills: 0 | Status: ON HOLD | OUTPATIENT
Start: 2022-04-20 | End: 2022-06-16

## 2022-04-20 RX ORDER — GABAPENTIN 300 MG/1
300 CAPSULE ORAL 3 TIMES DAILY
Status: ON HOLD | COMMUNITY
Start: 2022-03-07

## 2022-04-20 RX ORDER — SOTALOL HYDROCHLORIDE 80 MG/1
80 TABLET ORAL 2 TIMES DAILY
Status: ON HOLD | COMMUNITY
Start: 2022-03-31 | End: 2022-06-20 | Stop reason: SDUPTHER
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
## 2022-05-26 ENCOUNTER — PRE-ADMISSION TESTING (OUTPATIENT)
Dept: PREADMISSION TESTING | Facility: HOSPITAL | Age: 67
End: 2022-05-26

## 2022-05-26 ENCOUNTER — HOSPITAL ENCOUNTER (OUTPATIENT)
Dept: GENERAL RADIOLOGY | Facility: HOSPITAL | Age: 67
Discharge: HOME | End: 2022-05-26

## 2022-05-26 ENCOUNTER — HOSPITAL ENCOUNTER (OUTPATIENT)
Dept: MRI IMAGING | Facility: HOSPITAL | Age: 67
Discharge: HOME | End: 2022-05-26

## 2022-05-26 VITALS
WEIGHT: 285.06 LBS | OXYGEN SATURATION: 99 % | SYSTOLIC BLOOD PRESSURE: 132 MMHG | RESPIRATION RATE: 20 BRPM | DIASTOLIC BLOOD PRESSURE: 77 MMHG | HEIGHT: 70 IN | BODY MASS INDEX: 40.81 KG/M2 | HEART RATE: 87 BPM

## 2022-05-26 DIAGNOSIS — D32.9 MENINGIOMA: ICD-10-CM

## 2022-05-26 DIAGNOSIS — Z79.4 DIABETES MELLITUS DUE TO UNDERLYING CONDITION WITH DIABETIC AMYOTROPHY, WITH LONG-TERM CURRENT USE OF INSULIN: ICD-10-CM

## 2022-05-26 DIAGNOSIS — E08.44 DIABETES MELLITUS DUE TO UNDERLYING CONDITION WITH DIABETIC AMYOTROPHY, WITH LONG-TERM CURRENT USE OF INSULIN: ICD-10-CM

## 2022-05-26 DIAGNOSIS — D62 POSTOPERATIVE ANEMIA DUE TO ACUTE BLOOD LOSS: ICD-10-CM

## 2022-05-26 LAB
ALBUMIN SERPL-MCNC: 3.8 G/DL (ref 3.5–5.2)
ALBUMIN/GLOB SERPL: 1.4 G/DL
ALP SERPL-CCNC: 86 U/L (ref 39–117)
ALT SERPL W P-5'-P-CCNC: 15 U/L (ref 1–41)
ANION GAP SERPL CALCULATED.3IONS-SCNC: 9 MMOL/L (ref 5–15)
AST SERPL-CCNC: 12 U/L (ref 1–40)
BILIRUB SERPL-MCNC: 0.3 MG/DL (ref 0–1.2)
BUN SERPL-MCNC: 15 MG/DL (ref 8–23)
BUN/CREAT SERPL: 17.2 (ref 7–25)
CALCIUM SPEC-SCNC: 8.9 MG/DL (ref 8.6–10.5)
CHLORIDE SERPL-SCNC: 104 MMOL/L (ref 98–107)
CO2 SERPL-SCNC: 25 MMOL/L (ref 22–29)
CREAT SERPL-MCNC: 0.87 MG/DL (ref 0.76–1.27)
CRP SERPL-MCNC: <0.3 MG/DL (ref 0–0.5)
DEPRECATED RDW RBC AUTO: 46 FL (ref 37–54)
EGFRCR SERPLBLD CKD-EPI 2021: 95.2 ML/MIN/1.73
ERYTHROCYTE [DISTWIDTH] IN BLOOD BY AUTOMATED COUNT: 13 % (ref 12.3–15.4)
GLOBULIN UR ELPH-MCNC: 2.8 GM/DL
GLUCOSE SERPL-MCNC: 165 MG/DL (ref 65–99)
HBA1C MFR BLD: 8.1 % (ref 4.8–5.6)
HCT VFR BLD AUTO: 38.8 % (ref 37.5–51)
HCT VFR BLD AUTO: 39.4 % (ref 37.5–51)
HGB BLD-MCNC: 12.9 G/DL (ref 13–17.7)
INR PPP: 1.14 (ref 0.91–1.09)
MCH RBC QN AUTO: 31.2 PG (ref 26.6–33)
MCHC RBC AUTO-ENTMCNC: 32.7 G/DL (ref 31.5–35.7)
MCV RBC AUTO: 95.2 FL (ref 79–97)
PA ADP PRP-ACNC: 226 PRU (ref 194–418)
PLATELET # BLD AUTO: 183 10*3/MM3 (ref 140–450)
PLATELET # BLD AUTO: 196 10*3/MM3 (ref 140–450)
PMV BLD AUTO: 10.8 FL (ref 6–12)
POTASSIUM SERPL-SCNC: 3.6 MMOL/L (ref 3.5–5.2)
PROT SERPL-MCNC: 6.6 G/DL (ref 6–8.5)
PROTHROMBIN TIME: 14.2 SECONDS (ref 11.9–14.6)
RBC # BLD AUTO: 4.14 10*6/MM3 (ref 4.14–5.8)
SODIUM SERPL-SCNC: 138 MMOL/L (ref 136–145)
WBC NRBC COR # BLD: 10.03 10*3/MM3 (ref 3.4–10.8)

## 2022-05-26 PROCEDURE — 85576 BLOOD PLATELET AGGREGATION: CPT

## 2022-05-26 PROCEDURE — 83036 HEMOGLOBIN GLYCOSYLATED A1C: CPT

## 2022-05-26 PROCEDURE — 70553 MRI BRAIN STEM W/O & W/DYE: CPT

## 2022-05-26 PROCEDURE — 71045 X-RAY EXAM CHEST 1 VIEW: CPT

## 2022-05-26 PROCEDURE — 93005 ELECTROCARDIOGRAM TRACING: CPT

## 2022-05-26 PROCEDURE — 85610 PROTHROMBIN TIME: CPT

## 2022-05-26 PROCEDURE — 0 GADOBENATE DIMEGLUMINE 529 MG/ML SOLUTION: Performed by: NEUROLOGICAL SURGERY

## 2022-05-26 PROCEDURE — 85027 COMPLETE CBC AUTOMATED: CPT

## 2022-05-26 PROCEDURE — 93010 ELECTROCARDIOGRAM REPORT: CPT | Performed by: INTERNAL MEDICINE

## 2022-05-26 PROCEDURE — A9577 INJ MULTIHANCE: HCPCS | Performed by: NEUROLOGICAL SURGERY

## 2022-05-26 PROCEDURE — 80053 COMPREHEN METABOLIC PANEL: CPT

## 2022-05-26 PROCEDURE — 86140 C-REACTIVE PROTEIN: CPT

## 2022-05-26 PROCEDURE — 36415 COLL VENOUS BLD VENIPUNCTURE: CPT

## 2022-05-26 RX ADMIN — GADOBENATE DIMEGLUMINE 20 ML: 529 INJECTION, SOLUTION INTRAVENOUS at 17:47

## 2022-05-26 NOTE — DISCHARGE INSTRUCTIONS
Before you come to the hospital        Arrival time: AS DIRECTED BY OFFICE     YOU MAY TAKE THE FOLLOWING MEDICATION(S) THE MORNING OF SURGERY WITH A SIP OF WATER: ***sotalol and gabapentin           ALL OTHER HOME MEDICATION CHECK WITH YOUR PHYSICIAN (especially if you are taking diabetes medicines or blood thinners)    Do not take any Erectile Dysfunction medications (EX: CIALIS, VIAGRA) 24 hours prior to surgery      If you were given and instructed to use a germ- killing soap, use as directed the night before surgery and the morning of surgery before coming to the hospital.             Eating and drinking restrictions prior to scheduled arrival time    2 Hours before arrival time STOP   Drinking Clear liquids (water, apple juice-no pulp)     6 Hours before arrival time STOP   Milk or drinks that contain milk, full liquids    6 Hours before arrival time STOP   Light meals or foods, such as toast or cereal    8 Hours before arrival time STOP   Heavy foods, such as meat, fried foods, or fatty foods    (It is extremely important that you follow these guidelines to prevent delay or cancelation of your procedure)     Clear Liquids  Water and flavored water                                                                      Clear Fruit juices, such as cranberry juice and apple juice.  Black coffee (NO cream of any kind, including powdered).  Plain tea  Clear bouillon or broth.  Flavored gelatin.  Soda.  Gatorade or Powerade.  Full liquid examples  Juices that have pulp.  Frozen ice pops that contain fruit pieces.  Coffee with creamer  Milk.  Yogurt.              MANAGING PAIN AFTER SURGERY    We know you are probably wondering what your pain will be like after surgery.  Following surgery it is unrealistic to expect you will not have pain.   Pain is how our bodies let us know that something is wrong or cautions us to be careful.  That said, our goal is to make your pain tolerable.    Methods we may use to treat your  pain include (oral or IV medications, PCAs, epidurals, nerve blocks, etc.)   While some procedures require IV pain medications for a short time after surgery, transitioning to pain medications by mouth allows for better management of pain.   Your nurse will encourage you to take oral pain medications whenever possible.  IV medications work almost immediately, but only last a short while.  Taking medications by mouth allows for a more constant level of medication in your blood stream for a longer period of time.      Once your pain is out of control it is harder to get back under control.  It is important you are aware when your next dose of pain medication is due.  If you are admitted, your nurse may write the time of your next dose on the white board in your room to help you remember.      We are interested in your pain and encourage you to inform us about aggravating factors during your visit.   Many times a simple repositioning every few hours can make a big difference.    If your physician says it is okay, do not let your pain prevent you from getting out of bed. Be sure to call your nurse for assistance prior to getting up so you do not fall.      Before surgery, please decide your tolerable pain goal.  These faces help describe the pain ratings we use on a 0-10 scale.   Be prepared to tell us your goal and whether or not you take pain or anxiety medications at home.

## 2022-05-27 LAB
QT INTERVAL: 422 MS
QTC INTERVAL: 490 MS

## 2022-06-16 ENCOUNTER — ANESTHESIA EVENT (OUTPATIENT)
Dept: PERIOP | Facility: HOSPITAL | Age: 67
End: 2022-06-16

## 2022-06-16 ENCOUNTER — HOSPITAL ENCOUNTER (INPATIENT)
Facility: HOSPITAL | Age: 67
LOS: 4 days | Discharge: HOME | End: 2022-06-20
Attending: NEUROLOGICAL SURGERY | Admitting: NEUROLOGICAL SURGERY

## 2022-06-16 ENCOUNTER — APPOINTMENT (OUTPATIENT)
Dept: CT IMAGING | Facility: HOSPITAL | Age: 67
End: 2022-06-16

## 2022-06-16 ENCOUNTER — ANESTHESIA (OUTPATIENT)
Dept: PERIOP | Facility: HOSPITAL | Age: 67
End: 2022-06-16

## 2022-06-16 DIAGNOSIS — Z74.09 IMPAIRED MOBILITY AND ADLS: ICD-10-CM

## 2022-06-16 DIAGNOSIS — Z98.890 S/P CRANIOTOMY: ICD-10-CM

## 2022-06-16 DIAGNOSIS — Z78.9 IMPAIRED MOBILITY AND ADLS: ICD-10-CM

## 2022-06-16 DIAGNOSIS — Z74.09 IMPAIRED MOBILITY: ICD-10-CM

## 2022-06-16 DIAGNOSIS — D32.9 MENINGIOMA: Primary | ICD-10-CM

## 2022-06-16 LAB
ABO GROUP BLD: NORMAL
BLD GP AB SCN SERPL QL: NEGATIVE
GLUCOSE BLDC GLUCOMTR-MCNC: 112 MG/DL (ref 70–130)
GLUCOSE BLDC GLUCOMTR-MCNC: 176 MG/DL (ref 70–130)
GLUCOSE BLDC GLUCOMTR-MCNC: 284 MG/DL (ref 70–130)
GLUCOSE BLDC GLUCOMTR-MCNC: 97 MG/DL (ref 70–130)
RH BLD: NEGATIVE
T&S EXPIRATION DATE: NORMAL

## 2022-06-16 PROCEDURE — C1713 ANCHOR/SCREW BN/BN,TIS/BN: HCPCS | Performed by: NEUROLOGICAL SURGERY

## 2022-06-16 PROCEDURE — 0 LEVETIRACETAM IN NACL 0.75% 1000 MG/100ML SOLUTION: Performed by: NURSE ANESTHETIST, CERTIFIED REGISTERED

## 2022-06-16 PROCEDURE — 93010 ELECTROCARDIOGRAM REPORT: CPT | Performed by: EMERGENCY MEDICINE

## 2022-06-16 PROCEDURE — 25010000002 DEXAMETHASONE PER 1 MG: Performed by: NURSE PRACTITIONER

## 2022-06-16 PROCEDURE — 69990 MICROSURGERY ADD-ON: CPT | Performed by: NEUROLOGICAL SURGERY

## 2022-06-16 PROCEDURE — 93005 ELECTROCARDIOGRAM TRACING: CPT | Performed by: NEUROLOGICAL SURGERY

## 2022-06-16 PROCEDURE — 86900 BLOOD TYPING SEROLOGIC ABO: CPT | Performed by: NEUROLOGICAL SURGERY

## 2022-06-16 PROCEDURE — 25010000002 PHENYLEPHRINE 10 MG/ML SOLUTION 5 ML VIAL: Performed by: NURSE ANESTHETIST, CERTIFIED REGISTERED

## 2022-06-16 PROCEDURE — C1889 IMPLANT/INSERT DEVICE, NOC: HCPCS | Performed by: NEUROLOGICAL SURGERY

## 2022-06-16 PROCEDURE — 25010000002 SODIUM CHLORIDE 0.9 % WITH KCL 20 MEQ 20-0.9 MEQ/L-% SOLUTION: Performed by: NURSE PRACTITIONER

## 2022-06-16 PROCEDURE — 99024 POSTOP FOLLOW-UP VISIT: CPT | Performed by: NURSE PRACTITIONER

## 2022-06-16 PROCEDURE — 61781 SCAN PROC CRANIAL INTRA: CPT | Performed by: NEUROLOGICAL SURGERY

## 2022-06-16 PROCEDURE — 0 HYDROMORPHONE 1 MG/ML SOLUTION: Performed by: NURSE PRACTITIONER

## 2022-06-16 PROCEDURE — 86850 RBC ANTIBODY SCREEN: CPT | Performed by: NEUROLOGICAL SURGERY

## 2022-06-16 PROCEDURE — 00B10ZZ EXCISION OF CEREBRAL MENINGES, OPEN APPROACH: ICD-10-PCS | Performed by: NEUROLOGICAL SURGERY

## 2022-06-16 PROCEDURE — 25010000002 ONDANSETRON PER 1 MG: Performed by: NURSE ANESTHETIST, CERTIFIED REGISTERED

## 2022-06-16 PROCEDURE — 25010000002 CEFAZOLIN PER 500 MG: Performed by: NURSE PRACTITIONER

## 2022-06-16 PROCEDURE — 25010000002 PROPOFOL 10 MG/ML EMULSION: Performed by: NURSE ANESTHETIST, CERTIFIED REGISTERED

## 2022-06-16 PROCEDURE — 25010000002 CEFAZOLIN PER 500 MG: Performed by: NEUROLOGICAL SURGERY

## 2022-06-16 PROCEDURE — 63710000001 INSULIN LISPRO (HUMAN) PER 5 UNITS: Performed by: NURSE PRACTITIONER

## 2022-06-16 PROCEDURE — 25010000002 HYDROMORPHONE PER 4 MG: Performed by: ANESTHESIOLOGY

## 2022-06-16 PROCEDURE — 25010000002 EPINEPHRINE 1 MG/ML SOLUTION 1 ML AMPULE: Performed by: NEUROLOGICAL SURGERY

## 2022-06-16 PROCEDURE — 25010000002 HYDRALAZINE PER 20 MG: Performed by: NURSE PRACTITIONER

## 2022-06-16 PROCEDURE — 82962 GLUCOSE BLOOD TEST: CPT

## 2022-06-16 PROCEDURE — S0260 H&P FOR SURGERY: HCPCS | Performed by: NEUROLOGICAL SURGERY

## 2022-06-16 PROCEDURE — 88307 TISSUE EXAM BY PATHOLOGIST: CPT | Performed by: NEUROLOGICAL SURGERY

## 2022-06-16 PROCEDURE — 61512 CRNEC TREPH EXC MNGIOMA STTL: CPT | Performed by: NEUROLOGICAL SURGERY

## 2022-06-16 PROCEDURE — 25010000002 FENTANYL CITRATE (PF) 50 MCG/ML SOLUTION: Performed by: ANESTHESIOLOGY

## 2022-06-16 PROCEDURE — 70450 CT HEAD/BRAIN W/O DYE: CPT

## 2022-06-16 PROCEDURE — 25010000002 DEXAMETHASONE PER 1 MG: Performed by: NURSE ANESTHETIST, CERTIFIED REGISTERED

## 2022-06-16 PROCEDURE — 86901 BLOOD TYPING SEROLOGIC RH(D): CPT | Performed by: NEUROLOGICAL SURGERY

## 2022-06-16 DEVICE — ABSORBABLE HEMOSTAT (OXIDIZED REGENERATED CELLULOSE, U.S.P.)
Type: IMPLANTABLE DEVICE | Site: CRANIAL | Status: FUNCTIONAL
Brand: SURGICEL

## 2022-06-16 DEVICE — IMPLANTABLE DEVICE: Type: IMPLANTABLE DEVICE | Site: CRANIAL | Status: FUNCTIONAL

## 2022-06-16 DEVICE — SCRW PLT NEURO LEFORTE L/P SLF/DRL 1.4X3.7MM SLVR: Type: IMPLANTABLE DEVICE | Site: CRANIAL | Status: FUNCTIONAL

## 2022-06-16 DEVICE — HEMOST ABS SURGIFOAM SZ100 8X12 10MM: Type: IMPLANTABLE DEVICE | Site: CRANIAL | Status: FUNCTIONAL

## 2022-06-16 DEVICE — DURAGEN® PLUS DURAL REGENERATION MATRIX, 3 IN X 3 IN (7.5 CM X 7.5 CM)
Type: IMPLANTABLE DEVICE | Site: BRAIN | Status: FUNCTIONAL
Brand: DURAGEN® PLUS

## 2022-06-16 DEVICE — KT HEMOST ABS SURGIFOAM PORCN 1GRAM: Type: IMPLANTABLE DEVICE | Site: CRANIAL | Status: FUNCTIONAL

## 2022-06-16 RX ORDER — LANSOPRAZOLE
30 KIT NIGHTLY
Status: DISCONTINUED | OUTPATIENT
Start: 2022-06-16 | End: 2022-06-20 | Stop reason: HOSPADM

## 2022-06-16 RX ORDER — SODIUM CHLORIDE 0.9 % (FLUSH) 0.9 %
10 SYRINGE (ML) INJECTION AS NEEDED
Status: DISCONTINUED | OUTPATIENT
Start: 2022-06-16 | End: 2022-06-16 | Stop reason: HOSPADM

## 2022-06-16 RX ORDER — LABETALOL HYDROCHLORIDE 5 MG/ML
5 INJECTION, SOLUTION INTRAVENOUS
Status: DISCONTINUED | OUTPATIENT
Start: 2022-06-16 | End: 2022-06-16 | Stop reason: HOSPADM

## 2022-06-16 RX ORDER — LABETALOL HYDROCHLORIDE 5 MG/ML
10 INJECTION, SOLUTION INTRAVENOUS EVERY 4 HOURS PRN
Status: COMPLETED | OUTPATIENT
Start: 2022-06-16 | End: 2022-06-17

## 2022-06-16 RX ORDER — SODIUM CHLORIDE, SODIUM LACTATE, POTASSIUM CHLORIDE, CALCIUM CHLORIDE 600; 310; 30; 20 MG/100ML; MG/100ML; MG/100ML; MG/100ML
1000 INJECTION, SOLUTION INTRAVENOUS CONTINUOUS
Status: DISCONTINUED | OUTPATIENT
Start: 2022-06-16 | End: 2022-06-16

## 2022-06-16 RX ORDER — OXYCODONE AND ACETAMINOPHEN 10; 325 MG/1; MG/1
1 TABLET ORAL EVERY 4 HOURS PRN
Status: DISCONTINUED | OUTPATIENT
Start: 2022-06-16 | End: 2022-06-20 | Stop reason: HOSPADM

## 2022-06-16 RX ORDER — OXYCODONE AND ACETAMINOPHEN 7.5; 325 MG/1; MG/1
1 TABLET ORAL EVERY 4 HOURS PRN
Status: DISCONTINUED | OUTPATIENT
Start: 2022-06-16 | End: 2022-06-20 | Stop reason: HOSPADM

## 2022-06-16 RX ORDER — ONDANSETRON 2 MG/ML
4 INJECTION INTRAMUSCULAR; INTRAVENOUS EVERY 6 HOURS PRN
Status: DISCONTINUED | OUTPATIENT
Start: 2022-06-16 | End: 2022-06-20 | Stop reason: HOSPADM

## 2022-06-16 RX ORDER — NALOXONE HCL 0.4 MG/ML
0.04 VIAL (ML) INJECTION AS NEEDED
Status: DISCONTINUED | OUTPATIENT
Start: 2022-06-16 | End: 2022-06-16 | Stop reason: HOSPADM

## 2022-06-16 RX ORDER — LISINOPRIL 20 MG/1
40 TABLET ORAL DAILY
Status: DISCONTINUED | OUTPATIENT
Start: 2022-06-16 | End: 2022-06-20 | Stop reason: HOSPADM

## 2022-06-16 RX ORDER — SUFENTANIL CITRATE 50 UG/ML
INJECTION EPIDURAL; INTRAVENOUS AS NEEDED
Status: DISCONTINUED | OUTPATIENT
Start: 2022-06-16 | End: 2022-06-16 | Stop reason: SURG

## 2022-06-16 RX ORDER — DEXTROSE MONOHYDRATE 25 G/50ML
25 INJECTION, SOLUTION INTRAVENOUS
Status: DISCONTINUED | OUTPATIENT
Start: 2022-06-16 | End: 2022-06-17

## 2022-06-16 RX ORDER — BUTALBITAL, ACETAMINOPHEN AND CAFFEINE 50; 325; 40 MG/1; MG/1; MG/1
1 TABLET ORAL EVERY 4 HOURS PRN
Status: DISCONTINUED | OUTPATIENT
Start: 2022-06-16 | End: 2022-06-20 | Stop reason: HOSPADM

## 2022-06-16 RX ORDER — NICOTINE POLACRILEX 4 MG
15 LOZENGE BUCCAL
Status: DISCONTINUED | OUTPATIENT
Start: 2022-06-16 | End: 2022-06-17

## 2022-06-16 RX ORDER — LANSOPRAZOLE
30 KIT EVERY MORNING
Status: DISCONTINUED | OUTPATIENT
Start: 2022-06-17 | End: 2022-06-16 | Stop reason: SDUPTHER

## 2022-06-16 RX ORDER — AMLODIPINE BESYLATE 10 MG/1
10 TABLET ORAL DAILY
Status: DISCONTINUED | OUTPATIENT
Start: 2022-06-16 | End: 2022-06-20 | Stop reason: HOSPADM

## 2022-06-16 RX ORDER — ONDANSETRON 2 MG/ML
INJECTION INTRAMUSCULAR; INTRAVENOUS AS NEEDED
Status: DISCONTINUED | OUTPATIENT
Start: 2022-06-16 | End: 2022-06-16 | Stop reason: SURG

## 2022-06-16 RX ORDER — SOTALOL HYDROCHLORIDE 80 MG/1
80 TABLET ORAL 2 TIMES DAILY
Status: DISCONTINUED | OUTPATIENT
Start: 2022-06-16 | End: 2022-06-17

## 2022-06-16 RX ORDER — INSULIN LISPRO 100 [IU]/ML
0-14 INJECTION, SOLUTION INTRAVENOUS; SUBCUTANEOUS
Status: DISCONTINUED | OUTPATIENT
Start: 2022-06-16 | End: 2022-06-17

## 2022-06-16 RX ORDER — DROPERIDOL 2.5 MG/ML
0.62 INJECTION, SOLUTION INTRAMUSCULAR; INTRAVENOUS ONCE AS NEEDED
Status: DISCONTINUED | OUTPATIENT
Start: 2022-06-16 | End: 2022-06-16 | Stop reason: HOSPADM

## 2022-06-16 RX ORDER — HYDROMORPHONE HYDROCHLORIDE 1 MG/ML
0.2 INJECTION, SOLUTION INTRAMUSCULAR; INTRAVENOUS; SUBCUTANEOUS
Status: DISCONTINUED | OUTPATIENT
Start: 2022-06-16 | End: 2022-06-16 | Stop reason: HOSPADM

## 2022-06-16 RX ORDER — BUPIVACAINE HYDROCHLORIDE AND EPINEPHRINE 5; .0091 MG/ML; MG/ML
INJECTION, SOLUTION DENTAL; INFILTRATION AS NEEDED
Status: DISCONTINUED | OUTPATIENT
Start: 2022-06-16 | End: 2022-06-16 | Stop reason: HOSPADM

## 2022-06-16 RX ORDER — LEVETIRACETAM 500 MG/1
500 TABLET ORAL 2 TIMES DAILY
Status: DISCONTINUED | OUTPATIENT
Start: 2022-06-16 | End: 2022-06-20 | Stop reason: HOSPADM

## 2022-06-16 RX ORDER — OXYCODONE AND ACETAMINOPHEN 10; 325 MG/1; MG/1
1 TABLET ORAL ONCE AS NEEDED
Status: DISCONTINUED | OUTPATIENT
Start: 2022-06-16 | End: 2022-06-16 | Stop reason: HOSPADM

## 2022-06-16 RX ORDER — SODIUM CHLORIDE 0.9 % (FLUSH) 0.9 %
3-10 SYRINGE (ML) INJECTION AS NEEDED
Status: DISCONTINUED | OUTPATIENT
Start: 2022-06-16 | End: 2022-06-16 | Stop reason: HOSPADM

## 2022-06-16 RX ORDER — LEVETIRACETAM 10 MG/ML
INJECTION INTRAVASCULAR AS NEEDED
Status: DISCONTINUED | OUTPATIENT
Start: 2022-06-16 | End: 2022-06-16 | Stop reason: SURG

## 2022-06-16 RX ORDER — ACETAMINOPHEN 500 MG
1000 TABLET ORAL ONCE
Status: COMPLETED | OUTPATIENT
Start: 2022-06-16 | End: 2022-06-16

## 2022-06-16 RX ORDER — NEOSTIGMINE METHYLSULFATE 5 MG/5 ML
SYRINGE (ML) INTRAVENOUS AS NEEDED
Status: DISCONTINUED | OUTPATIENT
Start: 2022-06-16 | End: 2022-06-16 | Stop reason: SURG

## 2022-06-16 RX ORDER — HYDRALAZINE HYDROCHLORIDE 20 MG/ML
10 INJECTION INTRAMUSCULAR; INTRAVENOUS EVERY 6 HOURS PRN
Status: DISCONTINUED | OUTPATIENT
Start: 2022-06-16 | End: 2022-06-20 | Stop reason: HOSPADM

## 2022-06-16 RX ORDER — SODIUM CHLORIDE 0.9 % (FLUSH) 0.9 %
3 SYRINGE (ML) INJECTION EVERY 12 HOURS SCHEDULED
Status: DISCONTINUED | OUTPATIENT
Start: 2022-06-16 | End: 2022-06-16 | Stop reason: HOSPADM

## 2022-06-16 RX ORDER — SODIUM CHLORIDE, SODIUM LACTATE, POTASSIUM CHLORIDE, CALCIUM CHLORIDE 600; 310; 30; 20 MG/100ML; MG/100ML; MG/100ML; MG/100ML
100 INJECTION, SOLUTION INTRAVENOUS CONTINUOUS
Status: DISCONTINUED | OUTPATIENT
Start: 2022-06-16 | End: 2022-06-16

## 2022-06-16 RX ORDER — FENTANYL CITRATE 50 UG/ML
25 INJECTION, SOLUTION INTRAMUSCULAR; INTRAVENOUS
Status: DISCONTINUED | OUTPATIENT
Start: 2022-06-16 | End: 2022-06-16 | Stop reason: HOSPADM

## 2022-06-16 RX ORDER — LIDOCAINE HYDROCHLORIDE 20 MG/ML
INJECTION, SOLUTION EPIDURAL; INFILTRATION; INTRACAUDAL; PERINEURAL AS NEEDED
Status: DISCONTINUED | OUTPATIENT
Start: 2022-06-16 | End: 2022-06-16 | Stop reason: SURG

## 2022-06-16 RX ORDER — DEXAMETHASONE SODIUM PHOSPHATE 4 MG/ML
INJECTION, SOLUTION INTRA-ARTICULAR; INTRALESIONAL; INTRAMUSCULAR; INTRAVENOUS; SOFT TISSUE AS NEEDED
Status: DISCONTINUED | OUTPATIENT
Start: 2022-06-16 | End: 2022-06-16 | Stop reason: SURG

## 2022-06-16 RX ORDER — MAGNESIUM HYDROXIDE 1200 MG/15ML
LIQUID ORAL AS NEEDED
Status: DISCONTINUED | OUTPATIENT
Start: 2022-06-16 | End: 2022-06-16 | Stop reason: HOSPADM

## 2022-06-16 RX ORDER — LIDOCAINE HYDROCHLORIDE 10 MG/ML
0.5 INJECTION, SOLUTION EPIDURAL; INFILTRATION; INTRACAUDAL; PERINEURAL ONCE AS NEEDED
Status: DISCONTINUED | OUTPATIENT
Start: 2022-06-16 | End: 2022-06-16 | Stop reason: HOSPADM

## 2022-06-16 RX ORDER — HEPARIN SODIUM 5000 [USP'U]/ML
5000 INJECTION, SOLUTION INTRAVENOUS; SUBCUTANEOUS EVERY 12 HOURS SCHEDULED
Status: DISCONTINUED | OUTPATIENT
Start: 2022-06-17 | End: 2022-06-20 | Stop reason: HOSPADM

## 2022-06-16 RX ORDER — MANNITOL 20 G/100ML
INJECTION, SOLUTION INTRAVENOUS CONTINUOUS PRN
Status: DISCONTINUED | OUTPATIENT
Start: 2022-06-16 | End: 2022-06-16 | Stop reason: SURG

## 2022-06-16 RX ORDER — PROPOFOL 10 MG/ML
VIAL (ML) INTRAVENOUS AS NEEDED
Status: DISCONTINUED | OUTPATIENT
Start: 2022-06-16 | End: 2022-06-16 | Stop reason: SURG

## 2022-06-16 RX ORDER — BACITRACIN ZINC 500 [USP'U]/G
OINTMENT TOPICAL AS NEEDED
Status: DISCONTINUED | OUTPATIENT
Start: 2022-06-16 | End: 2022-06-16 | Stop reason: HOSPADM

## 2022-06-16 RX ORDER — SODIUM CHLORIDE 0.9 % (FLUSH) 0.9 %
3 SYRINGE (ML) INJECTION AS NEEDED
Status: DISCONTINUED | OUTPATIENT
Start: 2022-06-16 | End: 2022-06-16 | Stop reason: HOSPADM

## 2022-06-16 RX ORDER — CEFAZOLIN SODIUM IN 0.9 % NACL 3 G/100 ML
3 INTRAVENOUS SOLUTION, PIGGYBACK (ML) INTRAVENOUS ONCE
Status: COMPLETED | OUTPATIENT
Start: 2022-06-16 | End: 2022-06-16

## 2022-06-16 RX ORDER — ATORVASTATIN CALCIUM 10 MG/1
20 TABLET, FILM COATED ORAL NIGHTLY
Status: DISCONTINUED | OUTPATIENT
Start: 2022-06-16 | End: 2022-06-20 | Stop reason: HOSPADM

## 2022-06-16 RX ORDER — CEFAZOLIN SODIUM IN 0.9 % NACL 3 G/100 ML
3 INTRAVENOUS SOLUTION, PIGGYBACK (ML) INTRAVENOUS EVERY 8 HOURS
Status: COMPLETED | OUTPATIENT
Start: 2022-06-16 | End: 2022-06-17

## 2022-06-16 RX ORDER — ONDANSETRON 2 MG/ML
4 INJECTION INTRAMUSCULAR; INTRAVENOUS
Status: DISCONTINUED | OUTPATIENT
Start: 2022-06-16 | End: 2022-06-16 | Stop reason: HOSPADM

## 2022-06-16 RX ORDER — DEXAMETHASONE SODIUM PHOSPHATE 4 MG/ML
4 INJECTION, SOLUTION INTRA-ARTICULAR; INTRALESIONAL; INTRAMUSCULAR; INTRAVENOUS; SOFT TISSUE EVERY 6 HOURS
Status: COMPLETED | OUTPATIENT
Start: 2022-06-16 | End: 2022-06-18

## 2022-06-16 RX ORDER — ROCURONIUM BROMIDE 10 MG/ML
INJECTION, SOLUTION INTRAVENOUS AS NEEDED
Status: DISCONTINUED | OUTPATIENT
Start: 2022-06-16 | End: 2022-06-16 | Stop reason: SURG

## 2022-06-16 RX ORDER — FLUMAZENIL 0.1 MG/ML
0.2 INJECTION INTRAVENOUS AS NEEDED
Status: DISCONTINUED | OUTPATIENT
Start: 2022-06-16 | End: 2022-06-16 | Stop reason: HOSPADM

## 2022-06-16 RX ORDER — ONDANSETRON 4 MG/1
4 TABLET, FILM COATED ORAL EVERY 6 HOURS PRN
Status: DISCONTINUED | OUTPATIENT
Start: 2022-06-16 | End: 2022-06-20 | Stop reason: HOSPADM

## 2022-06-16 RX ORDER — SODIUM CHLORIDE AND POTASSIUM CHLORIDE 150; 900 MG/100ML; MG/100ML
50 INJECTION, SOLUTION INTRAVENOUS CONTINUOUS
Status: DISCONTINUED | OUTPATIENT
Start: 2022-06-16 | End: 2022-06-20 | Stop reason: HOSPADM

## 2022-06-16 RX ORDER — GABAPENTIN 400 MG/1
400 CAPSULE ORAL 3 TIMES DAILY
Status: DISCONTINUED | OUTPATIENT
Start: 2022-06-16 | End: 2022-06-20 | Stop reason: HOSPADM

## 2022-06-16 RX ADMIN — DEXAMETHASONE SODIUM PHOSPHATE 4 MG: 4 INJECTION, SOLUTION INTRA-ARTICULAR; INTRALESIONAL; INTRAMUSCULAR; INTRAVENOUS; SOFT TISSUE at 21:00

## 2022-06-16 RX ADMIN — Medication 4 MG: at 12:12

## 2022-06-16 RX ADMIN — POTASSIUM CHLORIDE AND SODIUM CHLORIDE 75 ML/HR: 900; 150 INJECTION, SOLUTION INTRAVENOUS at 14:37

## 2022-06-16 RX ADMIN — Medication 3 G: at 08:45

## 2022-06-16 RX ADMIN — SUFENTANIL CITRATE 20 MCG: 50 INJECTION EPIDURAL; INTRAVENOUS at 08:35

## 2022-06-16 RX ADMIN — ONDANSETRON 4 MG: 2 INJECTION INTRAMUSCULAR; INTRAVENOUS at 11:46

## 2022-06-16 RX ADMIN — AMLODIPINE BESYLATE 10 MG: 10 TABLET ORAL at 14:55

## 2022-06-16 RX ADMIN — SUFENTANIL CITRATE 5 MCG: 50 INJECTION EPIDURAL; INTRAVENOUS at 11:56

## 2022-06-16 RX ADMIN — PROPOFOL 180 MG: 10 INJECTION, EMULSION INTRAVENOUS at 08:35

## 2022-06-16 RX ADMIN — ATORVASTATIN CALCIUM 20 MG: 10 TABLET, FILM COATED ORAL at 20:04

## 2022-06-16 RX ADMIN — ROCURONIUM BROMIDE 50 MG: 10 SOLUTION INTRAVENOUS at 08:35

## 2022-06-16 RX ADMIN — NICARDIPINE HYDROCHLORIDE 12.5 MG/HR: 25 INJECTION, SOLUTION INTRAVENOUS at 17:05

## 2022-06-16 RX ADMIN — SODIUM CHLORIDE, POTASSIUM CHLORIDE, SODIUM LACTATE AND CALCIUM CHLORIDE 1000 ML: 600; 310; 30; 20 INJECTION, SOLUTION INTRAVENOUS at 06:25

## 2022-06-16 RX ADMIN — OXYCODONE AND ACETAMINOPHEN 1 TABLET: 325; 10 TABLET ORAL at 20:06

## 2022-06-16 RX ADMIN — HYDRALAZINE HYDROCHLORIDE 10 MG: 20 INJECTION, SOLUTION INTRAMUSCULAR; INTRAVENOUS at 17:41

## 2022-06-16 RX ADMIN — SODIUM CHLORIDE, POTASSIUM CHLORIDE, SODIUM LACTATE AND CALCIUM CHLORIDE 1000 ML: 600; 310; 30; 20 INJECTION, SOLUTION INTRAVENOUS at 06:18

## 2022-06-16 RX ADMIN — CEFAZOLIN 3 G: 10 INJECTION, POWDER, FOR SOLUTION INTRAVENOUS at 16:18

## 2022-06-16 RX ADMIN — DEXAMETHASONE SODIUM PHOSPHATE 10 MG: 4 INJECTION, SOLUTION INTRA-ARTICULAR; INTRALESIONAL; INTRAMUSCULAR; INTRAVENOUS; SOFT TISSUE at 09:51

## 2022-06-16 RX ADMIN — FENTANYL CITRATE 25 MCG: 50 INJECTION INTRAMUSCULAR; INTRAVENOUS at 12:59

## 2022-06-16 RX ADMIN — ROCURONIUM BROMIDE 20 MG: 10 SOLUTION INTRAVENOUS at 10:57

## 2022-06-16 RX ADMIN — SUFENTANIL CITRATE 5 MCG: 50 INJECTION EPIDURAL; INTRAVENOUS at 12:02

## 2022-06-16 RX ADMIN — ACETAMINOPHEN 1000 MG: 500 TABLET ORAL at 08:01

## 2022-06-16 RX ADMIN — SUFENTANIL CITRATE 10 MCG: 50 INJECTION EPIDURAL; INTRAVENOUS at 09:38

## 2022-06-16 RX ADMIN — INSULIN LISPRO 8 UNITS: 100 INJECTION, SOLUTION INTRAVENOUS; SUBCUTANEOUS at 16:55

## 2022-06-16 RX ADMIN — SUFENTANIL CITRATE 20 MCG: 50 INJECTION EPIDURAL; INTRAVENOUS at 08:55

## 2022-06-16 RX ADMIN — HYDROMORPHONE HYDROCHLORIDE 0.5 MG: 1 INJECTION, SOLUTION INTRAMUSCULAR; INTRAVENOUS; SUBCUTANEOUS at 16:36

## 2022-06-16 RX ADMIN — OXYCODONE HYDROCHLORIDE AND ACETAMINOPHEN 1 TABLET: 7.5; 325 TABLET ORAL at 15:36

## 2022-06-16 RX ADMIN — SOTALOL HYDROCHLORIDE 80 MG: 80 TABLET ORAL at 20:04

## 2022-06-16 RX ADMIN — BUTALBITAL, ACETAMINOPHEN AND CAFFEINE 1 TABLET: 50; 325; 40 TABLET ORAL at 18:28

## 2022-06-16 RX ADMIN — GLYCOPYRROLATE 0.8 MG: 0.2 INJECTION INTRAMUSCULAR; INTRAVENOUS at 12:12

## 2022-06-16 RX ADMIN — ROCURONIUM BROMIDE 30 MG: 10 SOLUTION INTRAVENOUS at 09:50

## 2022-06-16 RX ADMIN — LIDOCAINE HYDROCHLORIDE 100 MG: 20 INJECTION, SOLUTION EPIDURAL; INFILTRATION; INTRACAUDAL; PERINEURAL at 12:18

## 2022-06-16 RX ADMIN — SUFENTANIL CITRATE 10 MCG: 50 INJECTION EPIDURAL; INTRAVENOUS at 08:43

## 2022-06-16 RX ADMIN — SUFENTANIL CITRATE 20 MCG: 50 INJECTION EPIDURAL; INTRAVENOUS at 09:50

## 2022-06-16 RX ADMIN — SODIUM CHLORIDE, POTASSIUM CHLORIDE, SODIUM LACTATE AND CALCIUM CHLORIDE: 600; 310; 30; 20 INJECTION, SOLUTION INTRAVENOUS at 11:22

## 2022-06-16 RX ADMIN — NICARDIPINE HYDROCHLORIDE 15 MG/HR: 25 INJECTION, SOLUTION INTRAVENOUS at 19:42

## 2022-06-16 RX ADMIN — LEVETIRACETAM 1000 MG: 10 INJECTION INTRAVENOUS at 09:55

## 2022-06-16 RX ADMIN — LISINOPRIL 40 MG: 20 TABLET ORAL at 14:55

## 2022-06-16 RX ADMIN — GABAPENTIN 400 MG: 400 CAPSULE ORAL at 15:04

## 2022-06-16 RX ADMIN — LANSOPRAZOLE 30 MG: KIT at 20:57

## 2022-06-16 RX ADMIN — LIDOCAINE HYDROCHLORIDE 100 MG: 20 INJECTION, SOLUTION EPIDURAL; INFILTRATION; INTRACAUDAL; PERINEURAL at 08:35

## 2022-06-16 RX ADMIN — LABETALOL HYDROCHLORIDE 10 MG: 5 INJECTION INTRAVENOUS at 14:37

## 2022-06-16 RX ADMIN — DEXAMETHASONE SODIUM PHOSPHATE 4 MG: 4 INJECTION, SOLUTION INTRA-ARTICULAR; INTRALESIONAL; INTRAMUSCULAR; INTRAVENOUS; SOFT TISSUE at 15:03

## 2022-06-16 RX ADMIN — GABAPENTIN 400 MG: 400 CAPSULE ORAL at 20:04

## 2022-06-16 RX ADMIN — SUFENTANIL CITRATE 10 MCG: 50 INJECTION EPIDURAL; INTRAVENOUS at 11:21

## 2022-06-16 RX ADMIN — NICARDIPINE HYDROCHLORIDE 5 MG/HR: 25 INJECTION, SOLUTION INTRAVENOUS at 13:23

## 2022-06-16 RX ADMIN — PHENYLEPHRINE HYDROCHLORIDE 0.3 MCG/KG/MIN: 10 INJECTION INTRAVENOUS at 09:13

## 2022-06-16 RX ADMIN — MANNITOL: 20 INJECTION, SOLUTION INTRAVENOUS at 09:58

## 2022-06-16 RX ADMIN — ROCURONIUM BROMIDE 20 MG: 10 SOLUTION INTRAVENOUS at 09:24

## 2022-06-16 RX ADMIN — SOTALOL HYDROCHLORIDE 80 MG: 80 TABLET ORAL at 13:26

## 2022-06-16 RX ADMIN — LEVETIRACETAM 500 MG: 500 TABLET, FILM COATED ORAL at 20:04

## 2022-06-16 RX ADMIN — LABETALOL HYDROCHLORIDE 10 MG: 5 INJECTION INTRAVENOUS at 18:25

## 2022-06-16 RX ADMIN — FENTANYL CITRATE 25 MCG: 50 INJECTION INTRAMUSCULAR; INTRAVENOUS at 13:04

## 2022-06-16 RX ADMIN — HYDROMORPHONE HYDROCHLORIDE 0.2 MG: 1 INJECTION, SOLUTION INTRAMUSCULAR; INTRAVENOUS; SUBCUTANEOUS at 12:57

## 2022-06-16 RX ADMIN — BUTALBITAL, ACETAMINOPHEN AND CAFFEINE 1 TABLET: 50; 325; 40 TABLET ORAL at 14:32

## 2022-06-16 NOTE — ANESTHESIA PROCEDURE NOTES
Airway  Urgency: elective    Date/Time: 6/16/2022 8:37 AM    General Information and Staff    Patient location during procedure: OR  CRNA/CAA: Edwin Tinoco CRNA    Indications and Patient Condition  Indications for airway management: airway protection    Preoxygenated: yes  Mask difficulty assessment: 1 - vent by mask    Final Airway Details  Final airway type: endotracheal airway      Successful airway: ETT  Cuffed: yes   Successful intubation technique: direct laryngoscopy  Endotracheal tube insertion site: oral  Blade: Beard  Blade size: 2  ETT size (mm): 7.5  Cormack-Lehane Classification: grade I - full view of glottis  Placement verified by: chest auscultation and capnometry   Measured from: lips  ETT/EBT  to lips (cm): 21  Number of attempts at approach: 1  Assessment: lips, teeth, and gum same as pre-op and atraumatic intubation

## 2022-06-16 NOTE — NURSING NOTE
Dr Portillo at bedside. Okay to go to unit and get CT at a later time when vitals are more stable.
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
## 2022-06-16 NOTE — NURSING NOTE
Pt went SVT in 180s. BP dropped into the 60s Dr Veronica at bedside. Carotid massage performed per Dr Veronica. Vitals resumes to normal. Rahul aware reordered betapace and cardene started for pressure of 150-160 systolic.

## 2022-06-16 NOTE — PROGRESS NOTES
NEUROSURGERY DAILY PROGRESS NOTE    ASSESSMENT:   Elijah Escobar is a 66 y.o. with a significant comorbidity of coronary artery disease, diabetes, erectile dysfunction, GERD, hypertension, hyperlipidemia. He presents with a new problem of 2 episodes of intermittent amnesia suggestive of seizures. Physical exam findings of left pronator drift and dysmetria with left finger-nose otherwise neurologically intact.  His imaging shows avidly contrast-enhancing meningeal based mass with dural tails measuring 2.8 x 2.8 x 2.0 cm with surrounding FLAIR signal.    Past Medical History:   Diagnosis Date   • Brain tumor (HCC)    • Coronary artery disease    • COVID-19 vaccine series completed     MODERNA X 3; LAST DOSE 3/2022   • Diabetes (HCC)    • Erectile dysfunction    • GERD (gastroesophageal reflux disease)    • Hypercholesteremia    • Hypertension      Active Hospital Problems    Diagnosis    • **Meningioma (HCC)      PLAN:   Neuro: Stable.  Bright and awake x 2 to person and year.  Moves all extremities equal and symmetric.  Follows commands without prompting showing thumbs up and 2 fingers bilaterally.   Day of Surgery (6/16/2022) status post craniotomy for tumor   Pathology pending   Postop CT head pending   JUAN   MRI brain in a.m.   Continue [facility]   [facility]   ICU postop   Neurochecks per policy.  Call for decline     CV: Continuous cardiac monitoring.  Keep A-line.  Cardene PRN to keep SBP <140  Pulm: Maintaining O2 sat.  Continuous pulse oximetry.  Incentive spirometry.   : Remove Jeong ASAP, bladder scans and I/O cath per policy.   FEN: Regular diet.  Advance as tolerated.   GI: No acute issues.    ID: 23-hour postoperative prophylactic antibiotics.    Heme:  DVT prophylaxis; SCD's  Pain: Tolerable at present with oral meds.     Dispo: PT/OT.       CHIEF COMPLAINT:   Episodic seizures    Subjective  Symptom stable.  Doing well    Temp:  [97.6 °F (36.4 °C)-98.3 °F (36.8 °C)] 98.3 °F (36.8 °C)  Heart Rate:  [71-92]  85  Resp:  [16-20] 16  BP: (148-181)/() 148/77  Arterial Line BP: (108-127)/(54-68) 127/68    Objective:  General Appearance:  In no acute distress.    Vital signs: (most recent): Blood pressure 148/77, pulse 85, temperature 98.3 °F (36.8 °C), temperature source Temporal, resp. rate 16, weight 127 kg (279 lb 1.6 oz), SpO2 98 %.        Neurologic Exam     Mental Status   Oriented to person.   Disoriented to place.   Oriented to year.   Attention: normal. Concentration: normal.   Speech: speech is normal   Level of consciousness: alert    Bright and awake.  Appears restless and agitated, pulling at A-line and Jeong catheter.  Easily redirected.  Follows commands without prompting showing thumbs up and 2 fingers bilaterally.     Cranial Nerves     CN II   Visual fields full to confrontation.     CN III, IV, VI   Pupils are equal, round, and reactive to light.  Extraocular motions are normal.     CN V   Facial sensation intact.     CN VII   Facial expression full, symmetric.     CN VIII   CN VIII normal.     CN IX, X   CN IX normal.     CN XI   CN XI normal.     Motor Exam   Right arm tone: normal  Left arm tone: normal  Right leg tone: normal  Left leg tone: normal    Strength   Right deltoid: 5/5  Left deltoid: 5/5  Right biceps: 5/5  Left biceps: 5/5  Right triceps: 5/5  Left triceps: 5/5  Right wrist extension: 5/5  Left wrist extension: 5/5  Right iliopsoas: 5/5  Left iliopsoas: 5/5  Right quadriceps: 5/5  Left quadriceps: 5/5  Right anterior tibial: 5/5  Left anterior tibial: 5/5  Right gastroc: 5/5  Left gastroc: 5/5  Right EHL 5/5  Left EHL 5/5       Sensory Exam   Right arm light touch: normal  Left arm light touch: normal  Right leg light touch: normal  Left leg light touch: normal    Gait, Coordination, and Reflexes     Tremor   Resting tremor: absent  Intention tremor: absent  Action tremor: absent    Drains:   Closed/Suction Drain 1 Scalp Bulb (Active)   Site Description Unable to view 06/16/22 1240    Dressing Status Clean;Dry;Intact 06/16/22 1240   Drainage Appearance Bloody 06/16/22 1240   Status To bulb suction 06/16/22 1240       Urethral Catheter Non-latex;Silicone 16 Fr. (Active)   Daily Indications Selected surgeries ( tract, abdomen) 06/16/22 1240   Site Assessment Clean;Skin intact 06/16/22 1233   Collection Container Standard drainage bag 06/16/22 1240   Securement Method Securing device 06/16/22 1240   Output (mL) 1100 mL 06/16/22 1233       Output by Drain (mL) 06/15/22 0701 - 06/15/22 1900 06/15/22 1901 - 06/16/22 0700 06/16/22 0701 - 06/16/22 1301 Range Total   Requested LDAs do not have output data documented.       Imaging Results (Last 24 Hours)     ** No results found for the last 24 hours. **        Lab Results (last 24 hours)     Procedure Component Value Units Date/Time    POC Glucose Once [[id number]]  (Abnormal) Collected: 06/16/22 1237    Specimen: Blood Updated: 06/16/22 1248     Glucose 176 mg/dL      Comment: : [id number] Kp GiraldouaMeter ID: [id number]       POC Glucose Once [[id number]]  (Normal) Collected: 06/16/22 0741    Specimen: Blood Updated: 06/16/22 0753     Glucose 112 mg/dL      Comment: : [id number] Cely Skinner  DMeter ID: [id number]       SCANNED - LABS [[id number]] Resulted: 06/16/22     Updated: 06/16/22 0726    POC Glucose Once [[id number]]  (Normal) Collected: 06/16/22 0623    Specimen: Blood Updated: 06/16/22 0635     Glucose 97 mg/dL      Comment: : [id number] Amy LambertenMeter ID: [id number]           [id number]  Rahul Arnold APRN

## 2022-06-16 NOTE — BRIEF OP NOTE
CRANIOTOMY FOR TUMOR STERIOTACTIC WITH BRAIN LAB  Progress Note    Elijah Escobar  6/16/2022    Pre-op Diagnosis:   Meningioma (HCC) [D32.9]       Post-Op Diagnosis Codes:     * Meningioma (HCC) [D32.9]    Procedure/CPT® Codes:        Procedure(s):  CRANIOTOMY FOR TUMOR STERIOTACTIC WITH BRAIN LAB, right    Surgeon(s):  Flaco Portillo MD    Anesthesia: General    Staff:   Circulator: Adrian Petty RN; Ricky Sams RN; Nora Florez RN  Scrub Person: Fifi Wooten; Millie Ureña  Vendor Representative: Jason Sarimento  Assistant: Marianna Samano  Assistant: Marianna Samano      Estimated Blood Loss: 200ml    Urine Voided: 975 mL    Specimens:                Specimens     ID Source Type Tests Collected By Collected At Frozen?    A Brain Tissue · TISSUE PATHOLOGY EXAM  · SURGICAL PATHOLOGY EXAM   Flaco Portillo MD 6/16/22 1102 No    Description: BRAIN TUMOR FOR PERMANENT                Drains:   Closed/Suction Drain 1 Scalp Bulb (Active)   Site Description Unable to view 06/16/22 1405   Dressing Status Clean;Dry;Intact 06/16/22 1405   Drainage Appearance Bloody 06/16/22 1405   Status To bulb suction 06/16/22 1405   Output (mL) 45 mL 06/16/22 1508       Urethral Catheter Non-latex;Silicone 16 Fr. (Active)   Daily Indications Selected surgeries ( tract, abdomen) 06/16/22 1339   Site Assessment Clean;Skin intact 06/16/22 1339   Collection Container Standard drainage bag 06/16/22 1339   Securement Method Securing device 06/16/22 1339   Output (mL) 1100 mL 06/16/22 1233       Findings: Complete resection        Complications: None    Assistant: Marianna Samano  was responsible for performing the following activities: Retraction, Suction and Irrigation and their skilled assistance was necessary for the success of this case.    Flaco Portillo MD     Date: 6/16/2022  Time: 16:16 CDT

## 2022-06-16 NOTE — ANESTHESIA PREPROCEDURE EVALUATION
Anesthesia Evaluation     Patient summary reviewed and Nursing notes reviewed   no history of anesthetic complications:  NPO Solid Status: > 8 hours  NPO Liquid Status: > 8 hours           Airway   Mallampati: I  TM distance: >3 FB  Neck ROM: full  No difficulty expected  Dental      Pulmonary    (+) a smoker Current,   (-) sleep apnea  Cardiovascular   Exercise tolerance: poor (<4 METS)    Patient on routine beta blocker    (+) hypertension, CAD (coronary calcification on CT), PVD, hyperlipidemia,       Neuro/Psych  (-) seizures, TIA, CVA    ROS Comment: Mri brain  IMPRESSION:  1. Similar appearing 2.8 x 2.5 x 2.3 cm homogeneously enhancing  extra-axial lesion along the vertex of the right frontal lobe with  underlying reactive gliosis and effacement of the frontal horn right  lateral ventricle most consistent with meningioma. Similar when  comparing to [facility] MRI brain exam 3/25/2022.  GI/Hepatic/Renal/Endo    (+)  GERD,  diabetes mellitus type 2 using insulin,   (-) liver disease, no renal disease    Musculoskeletal     Abdominal    Substance History      OB/GYN          Other                        Anesthesia Plan    ASA 3     general     intravenous induction     Anesthetic plan, risks, benefits, and alternatives have been provided, discussed and informed consent has been obtained with: patient.        CODE STATUS:

## 2022-06-16 NOTE — ANESTHESIA PROCEDURE NOTES
Arterial Line    Pre-sedation assessment completed: 6/16/2022 7:46 AM    Patient reassessed immediately prior to procedure    Patient location during procedure: pre-op  Start time: 6/16/2022 7:47 AM  Stop Time:6/16/2022 7:49 AM       Line placed for hemodynamic monitoring.  Performed By   Anesthesiologist: Socorro Veronica MD  Preanesthetic Checklist  Completed: patient identified, IV checked, site marked, risks and benefits discussed, surgical consent, monitors and equipment checked, pre-op evaluation and timeout performed  Arterial Line Prep   Sterile Tech: gloves and mask  Prep: ChloraPrep  Patient monitoring: continuous pulse oximetry  Arterial Line Procedure   Laterality:left  Location:  radial artery  Catheter size: 20 G   Guidance: ultrasound guided  PROCEDURE NOTE/ULTRASOUND INTERPRETATION.  Using ultrasound guidance the potential vascular sites for insertion of the catheter were visualized to determine the patency of the vessel to be used for vascular access.  After selecting the appropriate site for insertion, the needle was visualized under ultrasound being inserted into the radial artery, followed by ultrasound confirmation of wire and catheter placement. There were no abnormalities seen on ultrasound; an image was taken; and the patient tolerated the procedure with no complications.   Number of attempts: 1  Successful placement: yes  Post Assessment   Dressing Type: secured with tape and wrist guard applied.   Complications no  Circ/Move/Sens Assessment: normal and unchanged.   Patient Tolerance: patient tolerated the procedure well with no apparent complications

## 2022-06-16 NOTE — ANESTHESIA POSTPROCEDURE EVALUATION
Patient: Elijah Escobar    Procedure Summary     Date: 06/16/22 Room / Location:  [facility] OR  /  [facility]    Anesthesia Start: 0829 Anesthesia Stop: 1237    Procedure: CRANIOTOMY FOR TUMOR STERIOTACTIC WITH BRAIN LAB, right (Right Head) Diagnosis:       Meningioma (HCC)      (Meningioma (HCC) [D32.9])    Surgeons: Flaco Portillo MD Provider: Edwin Tinoco CRNA    Anesthesia Type: general ASA Status: 3          Anesthesia Type: general    Vitals  Vitals Value Taken Time   /77 06/16/22 1314   Temp 98.3 °F (36.8 °C) 06/16/22 1233   Pulse 83 06/16/22 1317   Resp 16 06/16/22 1233   SpO2 99 % 06/16/22 1317   Vitals shown include unvalidated device data.        Post Anesthesia Care and Evaluation      Comments: I was called to PACU to evaluate patient. He had been awake and stable but his HR all of a sudden went up to 180 and he became hypotensive top 66/30.  I called for the nurse to retrieve medications and attempted carotid massage while waiting. His rhythm converted to NSR and BP normalized.        No

## 2022-06-17 ENCOUNTER — APPOINTMENT (OUTPATIENT)
Dept: MRI IMAGING | Facility: HOSPITAL | Age: 67
End: 2022-06-17

## 2022-06-17 LAB
ANION GAP SERPL CALCULATED.3IONS-SCNC: 12 MMOL/L (ref 5–15)
BASOPHILS # BLD AUTO: 0.02 10*3/MM3 (ref 0–0.2)
BASOPHILS NFR BLD AUTO: 0.1 % (ref 0–1.5)
BUN SERPL-MCNC: 15 MG/DL (ref 8–23)
BUN/CREAT SERPL: 15.3 (ref 7–25)
CALCIUM SPEC-SCNC: 8.6 MG/DL (ref 8.6–10.5)
CHLORIDE SERPL-SCNC: 103 MMOL/L (ref 98–107)
CO2 SERPL-SCNC: 22 MMOL/L (ref 22–29)
CREAT SERPL-MCNC: 0.98 MG/DL (ref 0.76–1.27)
CYTO UR: NORMAL
DEPRECATED RDW RBC AUTO: 43.2 FL (ref 37–54)
EGFRCR SERPLBLD CKD-EPI 2021: 85 ML/MIN/1.73
EOSINOPHIL # BLD AUTO: 0.01 10*3/MM3 (ref 0–0.4)
EOSINOPHIL NFR BLD AUTO: 0.1 % (ref 0.3–6.2)
ERYTHROCYTE [DISTWIDTH] IN BLOOD BY AUTOMATED COUNT: 12.3 % (ref 12.3–15.4)
GLUCOSE BLDC GLUCOMTR-MCNC: 287 MG/DL (ref 70–130)
GLUCOSE BLDC GLUCOMTR-MCNC: 301 MG/DL (ref 70–130)
GLUCOSE BLDC GLUCOMTR-MCNC: 308 MG/DL (ref 70–130)
GLUCOSE BLDC GLUCOMTR-MCNC: 325 MG/DL (ref 70–130)
GLUCOSE SERPL-MCNC: 290 MG/DL (ref 65–99)
HCT VFR BLD AUTO: 38.4 % (ref 37.5–51)
HGB BLD-MCNC: 12.6 G/DL (ref 13–17.7)
IMM GRANULOCYTES # BLD AUTO: 0.09 10*3/MM3 (ref 0–0.05)
IMM GRANULOCYTES NFR BLD AUTO: 0.6 % (ref 0–0.5)
LAB AP CASE REPORT: NORMAL
LAB AP SYNOPTIC CHECKLIST: NORMAL
LYMPHOCYTES # BLD AUTO: 1.68 10*3/MM3 (ref 0.7–3.1)
LYMPHOCYTES NFR BLD AUTO: 11.8 % (ref 19.6–45.3)
Lab: NORMAL
MCH RBC QN AUTO: 31.4 PG (ref 26.6–33)
MCHC RBC AUTO-ENTMCNC: 32.8 G/DL (ref 31.5–35.7)
MCV RBC AUTO: 95.8 FL (ref 79–97)
MONOCYTES # BLD AUTO: 0.41 10*3/MM3 (ref 0.1–0.9)
MONOCYTES NFR BLD AUTO: 2.9 % (ref 5–12)
NEUTROPHILS NFR BLD AUTO: 11.99 10*3/MM3 (ref 1.7–7)
NEUTROPHILS NFR BLD AUTO: 84.5 % (ref 42.7–76)
NRBC BLD AUTO-RTO: 0 /100 WBC (ref 0–0.2)
PATH REPORT.FINAL DX SPEC: NORMAL
PATH REPORT.GROSS SPEC: NORMAL
PLATELET # BLD AUTO: 212 10*3/MM3 (ref 140–450)
PMV BLD AUTO: 10.7 FL (ref 6–12)
POTASSIUM SERPL-SCNC: 4.1 MMOL/L (ref 3.5–5.2)
RBC # BLD AUTO: 4.01 10*6/MM3 (ref 4.14–5.8)
SODIUM SERPL-SCNC: 137 MMOL/L (ref 136–145)
WBC NRBC COR # BLD: 14.2 10*3/MM3 (ref 3.4–10.8)

## 2022-06-17 PROCEDURE — 63710000001 INSULIN DETEMIR PER 5 UNITS: Performed by: INTERNAL MEDICINE

## 2022-06-17 PROCEDURE — 25010000002 HEPARIN (PORCINE) PER 1000 UNITS: Performed by: NURSE PRACTITIONER

## 2022-06-17 PROCEDURE — 80048 BASIC METABOLIC PNL TOTAL CA: CPT | Performed by: NURSE PRACTITIONER

## 2022-06-17 PROCEDURE — 63710000001 INSULIN LISPRO (HUMAN) PER 5 UNITS: Performed by: NURSE PRACTITIONER

## 2022-06-17 PROCEDURE — 85025 COMPLETE CBC W/AUTO DIFF WBC: CPT | Performed by: NURSE PRACTITIONER

## 2022-06-17 PROCEDURE — 97166 OT EVAL MOD COMPLEX 45 MIN: CPT

## 2022-06-17 PROCEDURE — 25010000002 DEXAMETHASONE PER 1 MG: Performed by: NURSE PRACTITIONER

## 2022-06-17 PROCEDURE — 63710000001 INSULIN LISPRO (HUMAN) PER 5 UNITS: Performed by: INTERNAL MEDICINE

## 2022-06-17 PROCEDURE — 99024 POSTOP FOLLOW-UP VISIT: CPT | Performed by: NURSE PRACTITIONER

## 2022-06-17 PROCEDURE — 82962 GLUCOSE BLOOD TEST: CPT

## 2022-06-17 PROCEDURE — 25010000002 SODIUM CHLORIDE 0.9 % WITH KCL 20 MEQ 20-0.9 MEQ/L-% SOLUTION: Performed by: NURSE PRACTITIONER

## 2022-06-17 PROCEDURE — 25010000002 CEFAZOLIN PER 500 MG: Performed by: NURSE PRACTITIONER

## 2022-06-17 PROCEDURE — 25010000002 SODIUM CHLORIDE 0.9 % WITH KCL 20 MEQ 20-0.9 MEQ/L-% SOLUTION: Performed by: INTERNAL MEDICINE

## 2022-06-17 PROCEDURE — 25010000002 HYDRALAZINE PER 20 MG: Performed by: NURSE PRACTITIONER

## 2022-06-17 PROCEDURE — 97162 PT EVAL MOD COMPLEX 30 MIN: CPT

## 2022-06-17 RX ORDER — ALUMINA, MAGNESIA, AND SIMETHICONE 2400; 2400; 240 MG/30ML; MG/30ML; MG/30ML
15 SUSPENSION ORAL EVERY 6 HOURS PRN
Status: DISCONTINUED | OUTPATIENT
Start: 2022-06-17 | End: 2022-06-20 | Stop reason: HOSPADM

## 2022-06-17 RX ORDER — DEXTROSE MONOHYDRATE 25 G/50ML
25 INJECTION, SOLUTION INTRAVENOUS
Status: DISCONTINUED | OUTPATIENT
Start: 2022-06-17 | End: 2022-06-20 | Stop reason: HOSPADM

## 2022-06-17 RX ORDER — HYDROCODONE BITARTRATE AND ACETAMINOPHEN 10; 325 MG/1; MG/1
1 TABLET ORAL EVERY 6 HOURS PRN
Status: ON HOLD | COMMUNITY
End: 2022-06-20 | Stop reason: SDUPTHER

## 2022-06-17 RX ORDER — CARVEDILOL 6.25 MG/1
12.5 TABLET ORAL 2 TIMES DAILY WITH MEALS
Status: DISCONTINUED | OUTPATIENT
Start: 2022-06-17 | End: 2022-06-19

## 2022-06-17 RX ORDER — NICOTINE POLACRILEX 4 MG
15 LOZENGE BUCCAL
Status: DISCONTINUED | OUTPATIENT
Start: 2022-06-17 | End: 2022-06-18 | Stop reason: SDUPTHER

## 2022-06-17 RX ORDER — HYDROCHLOROTHIAZIDE 25 MG/1
25 TABLET ORAL DAILY
Status: DISCONTINUED | OUTPATIENT
Start: 2022-06-17 | End: 2022-06-20 | Stop reason: HOSPADM

## 2022-06-17 RX ORDER — INSULIN LISPRO 100 [IU]/ML
0-14 INJECTION, SOLUTION INTRAVENOUS; SUBCUTANEOUS
Status: DISCONTINUED | OUTPATIENT
Start: 2022-06-17 | End: 2022-06-18 | Stop reason: DRUGHIGH

## 2022-06-17 RX ORDER — LABETALOL HYDROCHLORIDE 5 MG/ML
10 INJECTION, SOLUTION INTRAVENOUS EVERY 4 HOURS PRN
Status: DISCONTINUED | OUTPATIENT
Start: 2022-06-17 | End: 2022-06-20 | Stop reason: HOSPADM

## 2022-06-17 RX ADMIN — ALUMINUM HYDROXIDE, MAGNESIUM HYDROXIDE, AND DIMETHICONE 15 ML: 400; 400; 40 SUSPENSION ORAL at 02:31

## 2022-06-17 RX ADMIN — HEPARIN SODIUM 5000 UNITS: 5000 INJECTION INTRAVENOUS; SUBCUTANEOUS at 08:10

## 2022-06-17 RX ADMIN — LISINOPRIL 40 MG: 20 TABLET ORAL at 07:22

## 2022-06-17 RX ADMIN — CEFAZOLIN 3 G: 10 INJECTION, POWDER, FOR SOLUTION INTRAVENOUS at 00:41

## 2022-06-17 RX ADMIN — LABETALOL HYDROCHLORIDE 10 MG: 5 INJECTION INTRAVENOUS at 18:58

## 2022-06-17 RX ADMIN — LABETALOL HYDROCHLORIDE 10 MG: 5 INJECTION INTRAVENOUS at 07:53

## 2022-06-17 RX ADMIN — INSULIN DETEMIR 25 UNITS: 100 INJECTION, SOLUTION SUBCUTANEOUS at 11:44

## 2022-06-17 RX ADMIN — DEXAMETHASONE SODIUM PHOSPHATE 4 MG: 4 INJECTION, SOLUTION INTRA-ARTICULAR; INTRALESIONAL; INTRAMUSCULAR; INTRAVENOUS; SOFT TISSUE at 15:01

## 2022-06-17 RX ADMIN — INSULIN DETEMIR 25 UNITS: 100 INJECTION, SOLUTION SUBCUTANEOUS at 20:03

## 2022-06-17 RX ADMIN — OXYCODONE AND ACETAMINOPHEN 1 TABLET: 325; 10 TABLET ORAL at 12:44

## 2022-06-17 RX ADMIN — LEVETIRACETAM 500 MG: 500 TABLET, FILM COATED ORAL at 08:09

## 2022-06-17 RX ADMIN — OXYCODONE AND ACETAMINOPHEN 1 TABLET: 325; 10 TABLET ORAL at 20:00

## 2022-06-17 RX ADMIN — HYDROCHLOROTHIAZIDE 25 MG: 25 TABLET ORAL at 09:50

## 2022-06-17 RX ADMIN — DEXAMETHASONE SODIUM PHOSPHATE 4 MG: 4 INJECTION, SOLUTION INTRA-ARTICULAR; INTRALESIONAL; INTRAMUSCULAR; INTRAVENOUS; SOFT TISSUE at 21:32

## 2022-06-17 RX ADMIN — CARVEDILOL 12.5 MG: 6.25 TABLET, FILM COATED ORAL at 21:49

## 2022-06-17 RX ADMIN — HEPARIN SODIUM 5000 UNITS: 5000 INJECTION INTRAVENOUS; SUBCUTANEOUS at 20:02

## 2022-06-17 RX ADMIN — ATORVASTATIN CALCIUM 20 MG: 10 TABLET, FILM COATED ORAL at 20:03

## 2022-06-17 RX ADMIN — POTASSIUM CHLORIDE AND SODIUM CHLORIDE 50 ML/HR: 900; 150 INJECTION, SOLUTION INTRAVENOUS at 23:02

## 2022-06-17 RX ADMIN — DEXAMETHASONE SODIUM PHOSPHATE 4 MG: 4 INJECTION, SOLUTION INTRA-ARTICULAR; INTRALESIONAL; INTRAMUSCULAR; INTRAVENOUS; SOFT TISSUE at 09:19

## 2022-06-17 RX ADMIN — DEXAMETHASONE SODIUM PHOSPHATE 4 MG: 4 INJECTION, SOLUTION INTRA-ARTICULAR; INTRALESIONAL; INTRAMUSCULAR; INTRAVENOUS; SOFT TISSUE at 04:42

## 2022-06-17 RX ADMIN — CEFAZOLIN 3 G: 10 INJECTION, POWDER, FOR SOLUTION INTRAVENOUS at 08:12

## 2022-06-17 RX ADMIN — LANSOPRAZOLE 30 MG: KIT at 20:01

## 2022-06-17 RX ADMIN — INSULIN LISPRO 10 UNITS: 100 INJECTION, SOLUTION INTRAVENOUS; SUBCUTANEOUS at 11:47

## 2022-06-17 RX ADMIN — INSULIN LISPRO 10 UNITS: 100 INJECTION, SOLUTION INTRAVENOUS; SUBCUTANEOUS at 07:40

## 2022-06-17 RX ADMIN — GABAPENTIN 400 MG: 400 CAPSULE ORAL at 08:12

## 2022-06-17 RX ADMIN — OXYCODONE AND ACETAMINOPHEN 1 TABLET: 325; 10 TABLET ORAL at 06:40

## 2022-06-17 RX ADMIN — POTASSIUM CHLORIDE AND SODIUM CHLORIDE 75 ML/HR: 900; 150 INJECTION, SOLUTION INTRAVENOUS at 05:39

## 2022-06-17 RX ADMIN — HYDRALAZINE HYDROCHLORIDE 10 MG: 20 INJECTION, SOLUTION INTRAMUSCULAR; INTRAVENOUS at 07:14

## 2022-06-17 RX ADMIN — INSULIN LISPRO 10 UNITS: 100 INJECTION, SOLUTION INTRAVENOUS; SUBCUTANEOUS at 17:31

## 2022-06-17 RX ADMIN — LEVETIRACETAM 500 MG: 500 TABLET, FILM COATED ORAL at 20:03

## 2022-06-17 RX ADMIN — GABAPENTIN 400 MG: 400 CAPSULE ORAL at 15:01

## 2022-06-17 RX ADMIN — HYDRALAZINE HYDROCHLORIDE 10 MG: 20 INJECTION, SOLUTION INTRAMUSCULAR; INTRAVENOUS at 13:51

## 2022-06-17 RX ADMIN — GABAPENTIN 400 MG: 400 CAPSULE ORAL at 20:00

## 2022-06-17 RX ADMIN — SOTALOL HYDROCHLORIDE 80 MG: 80 TABLET ORAL at 07:21

## 2022-06-17 RX ADMIN — AMLODIPINE BESYLATE 10 MG: 10 TABLET ORAL at 07:21

## 2022-06-17 NOTE — CONSULTS
[facility] Medicine Consult  Inpatient Hospitalist Consult  Consult performed by: Joey Rubi MD  Consult ordered by: Flaco Portillo MD  Reason for consult: management of hypertension and diabetes  Assessment/Recommendations: 1) Levemir BID  2) Increase intensity of sliding scale insulin  3) D/C sotalol (per review, may have had SVT in the past.  No issues in a long time).  Switch to carvedilol for more BP treatment  4) Continue lisinopril.  Start HCTZ          Date of Admission: 6/16/2022  Date of Consult: 06/17/22    Primary Care Physician: Dylan Lama APRN  Referring Physician: Lindsey  Chief Complaint/Reason for Consultation: diabetes, hypertension    Subjective   History of Present Illness    Mr. Escobar is a 66-year-old gentleman with past medical history of hypertension and diabetes.  Patient has had some issues over the last 6 months.  Patient had an episode where he was driving and woke up in the field and was unsure how he got there, and no further episodes for a long time.  Roughly 3 weeks prior to presentation, the patient was noted to have blurry vision.  Patient subsequently sought care with his primary care provider who ordered an MRI of the brain, and was found to have a large right frontal meningioma.  Patient presented via Dr. Portillo for craniotomy and stereotactic tumor removal.  Patient was subsequently admitted to the ICU on 6/16.  Patient's blood pressure has been difficult to control, and has been requiring Cardene drip.  Patient's blood sugars with the steroids has also been increasing.  Therefore they chose to consult us for assistance with management.  Patient reports his diabetes is well controlled he indicates that he takes a sliding scale short acting insulin, and takes twice daily long-acting insulin.  For the patient's blood pressure, indicates that he takes lisinopril and amlodipine at home.  Patient indicates that both  of these chronic conditions are typically well controlled.  Patient also noted to be morbidly obese, with a BMI of greater than 40.      Review of Systems   Constitutional: Negative for activity change, diaphoresis, fatigue and fever.   Respiratory: Negative for cough and shortness of breath.    Cardiovascular: Negative for chest pain and leg swelling.   Gastrointestinal: Negative for abdominal distention, abdominal pain and anal bleeding.   Neurological: Positive for weakness.   All other systems reviewed and are negative.     Otherwise complete ROS is negative except as mentioned above.    Past Medical History:   Past Medical History:   Diagnosis Date   • Brain tumor (HCC)    • Coronary artery disease    • COVID-19 vaccine series completed     MODERNA X 3; LAST DOSE 3/2022   • Diabetes (HCC)    • Erectile dysfunction    • GERD (gastroesophageal reflux disease)    • Hypercholesteremia    • Hypertension      Past Surgical History:  Past Surgical History:   Procedure Laterality Date   • BALLOON ANGIOPLASTY, ARTERY Right    • COLONOSCOPY       Social History:  reports that he has been smoking. He has been smoking about 0.50 packs per day. He has never used smokeless tobacco. He reports that he does not drink alcohol and does not use drugs.    Family History: family history includes Cancer in his mother; Heart disease in his father.     Allergies: No Known Allergies  Medications: Scheduled Meds:amLODIPine, 10 mg, Oral, Daily  atorvastatin, 20 mg, Oral, Nightly  dexamethasone, 4 mg, Intravenous, Q6H  gabapentin, 400 mg, Oral, TID  heparin (porcine), 5,000 Units, Subcutaneous, Q12H  hydroCHLOROthiazide, 25 mg, Oral, Daily  insulin detemir, 25 Units, Subcutaneous, Q12H  insulin lispro, 0-14 Units, Subcutaneous, TID AC  lansoprazole, 30 mg, Oral, Nightly  levETIRAcetam, 500 mg, Oral, BID  lisinopril, 40 mg, Oral, Daily      Continuous Infusions:niCARdipine, 5-15 mg/hr, Last Rate: Stopped (06/17/22 1015)  sodium chloride  0.9 % with KCl 20 mEq, 50 mL/hr, Last Rate: 50 mL/hr (06/17/22 0943)      PRN Meds:.•  aluminum-magnesium hydroxide-simethicone  •  butalbital-acetaminophen-caffeine  •  dextrose  •  dextrose  •  glucagon (human recombinant)  •  hydrALAZINE  •  ondansetron **OR** ondansetron  •  oxyCODONE-acetaminophen  •  oxyCODONE-acetaminophen    I have utilized all available immediate resources to obtain, update, and review the patient's current medications.    Objective   Objective    Physical Exam:   Temp:  [98.1 °F (36.7 °C)-98.9 °F (37.2 °C)] 98.5 °F (36.9 °C)  Heart Rate:  [] 67  Resp:  [15-20] 15  BP: (114-205)/(57-99) 130/68  Arterial Line BP: ()/(43-74) 109/50  Physical Exam  Vitals and nursing note reviewed.   Constitutional:       Appearance: He is obese.   HENT:      Head: Normocephalic.      Comments: Head dressing still in place from surgery     Nose: No congestion or rhinorrhea.      Mouth/Throat:      Mouth: Mucous membranes are dry.      Pharynx: Oropharynx is clear.   Eyes:      General: No scleral icterus.     Conjunctiva/sclera: Conjunctivae normal.   Cardiovascular:      Rate and Rhythm: Normal rate and regular rhythm.      Heart sounds: Murmur heard.   Pulmonary:      Effort: Pulmonary effort is normal. No respiratory distress.      Breath sounds: Normal breath sounds. No stridor.   Abdominal:      General: Abdomen is flat. Bowel sounds are normal.      Palpations: Abdomen is soft.   Musculoskeletal:      Right lower leg: Edema (trace) present.      Left lower leg: Edema (trace) present.   Skin:     General: Skin is warm and dry.      Coloration: Skin is not jaundiced or pale.   Neurological:      General: No focal deficit present.      Mental Status: He is alert and oriented to person, place, and time.   Psychiatric:         Mood and Affect: Mood normal.         Behavior: Behavior normal.           Results Reviewed:  I have personally reviewed current lab, radiology, and data and agree with  results.  Lab Results (last 24 hours)     Procedure Component Value Units Date/Time    POC Glucose Once [[id number]]  (Abnormal) Collected: 06/17/22 0732    Specimen: Blood Updated: 06/17/22 0744     Glucose 301 mg/dL      Comment: : [id number] Lori Carter ID: [id number]       Basic Metabolic Panel [[id number]]  (Abnormal) Collected: 06/17/22 0249    Specimen: Blood Updated: 06/17/22 0322     Glucose 290 mg/dL      BUN 15 mg/dL      Creatinine 0.98 mg/dL      Sodium 137 mmol/L      Potassium 4.1 mmol/L      Chloride 103 mmol/L      CO2 22.0 mmol/L      Calcium 8.6 mg/dL      BUN/Creatinine Ratio 15.3     Anion Gap 12.0 mmol/L      eGFR 85.0 mL/min/1.73      Comment: National Kidney Foundation and American Society of Nephrology (ASN) Task Force recommended calculation based on the Chronic Kidney Disease Epidemiology Collaboration (CKD-EPI) equation refit without adjustment for race.       Narrative:      GFR Normal >60  Chronic Kidney Disease <60  Kidney Failure <15      CBC & Differential [[id number]]  (Abnormal) Collected: 06/17/22 0249    Specimen: Blood Updated: 06/17/22 0303    Narrative:      The following orders were created for panel order CBC & Differential.  Procedure                               Abnormality         Status                     ---------                               -----------         ------                     CBC Auto Differential[[id number]]        Abnormal            Final result                 Please view results for these tests on the individual orders.    CBC Auto Differential [[id number]]  (Abnormal) Collected: 06/17/22 0249    Specimen: Blood Updated: 06/17/22 0303     WBC 14.20 10*3/mm3      RBC 4.01 10*6/mm3      Hemoglobin 12.6 g/dL      Hematocrit 38.4 %      MCV 95.8 fL      MCH 31.4 pg      MCHC 32.8 g/dL      RDW 12.3 %      RDW-SD 43.2 fl      MPV 10.7 fL      Platelets 212 10*3/mm3      Neutrophil % 84.5 %      Lymphocyte % 11.8 %      Monocyte % 2.9 %       Eosinophil % 0.1 %      Basophil % 0.1 %      Immature Grans % 0.6 %      Neutrophils, Absolute 11.99 10*3/mm3      Lymphocytes, Absolute 1.68 10*3/mm3      Monocytes, Absolute 0.41 10*3/mm3      Eosinophils, Absolute 0.01 10*3/mm3      Basophils, Absolute 0.02 10*3/mm3      Immature Grans, Absolute 0.09 10*3/mm3      nRBC 0.0 /100 WBC     POC Glucose Once [[id number]]  (Abnormal) Collected: 06/16/22 1643    Specimen: Blood Updated: 06/16/22 1654     Glucose 284 mg/dL      Comment: : [id number] English SethMeter ID: [id number]       Tissue Pathology Exam [[id number]] Collected: 06/16/22 1102    Specimen: Tissue from Brain Updated: 06/16/22 1524    POC Glucose Once [[id number]]  (Abnormal) Collected: 06/16/22 1237    Specimen: Blood Updated: 06/16/22 1248     Glucose 176 mg/dL      Comment: : [id number] Kp MilenashuaMeter ID: [id number]           Imaging Results (Last 24 Hours)     Procedure Component Value Units Date/Time    CT Head Without Contrast [[id number]] Collected: 06/16/22 1737     Updated: 06/16/22 1746    Narrative:      EXAMINATION:  CT HEAD WO CONTRAST-  6/16/2022 5:31 PM CDT     HISTORY: Evaluation status post craniotomy for tumor removal;  D32.9-Benign neoplasm of meninges, unspecified     TECHNIQUE: Multiple axial images were obtained through the brain without  contrast infusion. Multiplanar images were reconstructed.     DLP: 2140 mGy-cm. Automated dosage reduction technique was utilized to  reduce patient dosage.     COMPARISON: 3/25/2022.     FINDINGS: There has been craniotomy on the right in the frontal region.  A drain extends into the subdural space. There is a small amount of  hemorrhage in the subdural space. There is air in the dural space. There  is residual edema in the right frontal white matter. There is mild  sulcal effacement in the right frontal region. There is continued mild  compression of the right lateral ventricle. No parenchymal hemorrhage is  observed.       Impression:       1. Status post craniotomy on the right with resection of a tumor from  the frontal dural space. A subdural drain is located in this area. There  is air in the subdural space. There is minimal hemorrhage in the  subdural space.  2. Vasogenic edema in the right frontal white matter remains stable.  There is continued mild mass effect with sulcal effacement and mild  compression of the right lateral ventricle.     This report was finalized on 06/16/2022 17:43 by Dr. Arian Stauffer MD.          Assessment / Plan   Assessment:     Active Hospital Problems    Diagnosis    • **Meningioma (HCC)    • Hypertension    • GERD (gastroesophageal reflux disease)    • Coronary artery disease    • Morbid obesity (HCC)    • Type 2 diabetes mellitus with hyperglycemia and peripheral neuropathy, with long-term current use of insulin (HCC)    • Leukocytosis    • Other specified anemias             Plan:   Consulted for hypertension and diabetes    For patients hypertension, target is SBP < 140.  Continue amlodipine.  D/C sotalol and switch to carvedilol.  When we did some chart review it seems as though patient has been on it for a long time and it was for some episodes of ?SVT.  Will try just utilizing carvedilol, if he has issues with tachyarrhythmia can always reinitiate the sotalol.  Continue lisinopril 40 mg.  HCTZ 25 mg.    For his diabetes, he has had some hyperglycemia, likely due to stress reaction and corticosteroids.  Patient takes long-acting BID athome.  Will start levemir 25 units BID and increase the intensity of his sliding scale with qac/qhs accuchecks.  Will adjust as necessary based on how patient is eating.    Code Status/Advanced Care Plan: Full    The patient's surrogate decision maker is Ruth Ann Escobar.     I discussed my findings and recommendations with the patient, neurosurgery, bedside RN.    Patient seen and examined by me on 6/17/2022 at 1024.    Electronically signed by Joey Rubi MD, 06/17/22,  10:24 CDT.

## 2022-06-17 NOTE — THERAPY EVALUATION
Patient Name: Elijah Escobar  : 1955    MRN: [id number]                              Today's Date: 2022       Admit Date: 2022    Visit Dx:     ICD-10-CM ICD-9-CM   1. Meningioma (HCC)  D32.9 225.2   2. Impaired mobility and ADLs  Z74.09 V49.89    Z78.9      Patient Active Problem List   Diagnosis   • Meningioma (HCC)   • Hypertension   • GERD (gastroesophageal reflux disease)   • Coronary artery disease   • Morbid obesity (HCC)   • Type 2 diabetes mellitus with hyperglycemia and peripheral neuropathy, with long-term current use of insulin (HCC)   • Leukocytosis   • Other specified anemias     Past Medical History:   Diagnosis Date   • Brain tumor (HCC)    • Coronary artery disease    • COVID-19 vaccine series completed     MODERNA X 3; LAST DOSE 3/2022   • Diabetes (HCC)    • Erectile dysfunction    • GERD (gastroesophageal reflux disease)    • Hypercholesteremia    • Hypertension      Past Surgical History:   Procedure Laterality Date   • BALLOON ANGIOPLASTY, ARTERY Right    • COLONOSCOPY        General Information     Row Name 22 0830          OT Time and Intention    Document Type evaluation  -LS     Mode of Treatment occupational therapy  -LS     Row Name 22 0830          General Information    Patient Profile Reviewed yes  -LS     Prior Level of Function independent:;all household mobility;community mobility;bathing;cooking;cleaning;home management;shopping;driving;bed mobility;feeding;grooming;yard work;min assist:;dressing  -LS     Existing Precautions/Restrictions fall  JUAN drain  -LS     Barriers to Rehab medically complex  -LS     Row Name 22 0830          Occupational Profile    Reason for Services/Referral (Occupational Profile) s/p Craniotomy to remove R frontal meningioma.  -LS     Environmental Supports and Barriers (Occupational Profile) Pt lives in a single level home. Pt has a tub shower combination with a shower chair and no grab bars.  -LS     Row Name 22  0830          Living Environment    People in Home spouse  -     Row Name 06/17/22 0830          Home Main Entrance    Number of Stairs, Main Entrance three  -     Stair Railings, Main Entrance railing on left side (ascending)  -     Row Name 06/17/22 0830          Cognition    Orientation Status (Cognition) oriented x 4  -[facility] Name 06/17/22 0830          Safety Issues, Functional Mobility    Safety Issues Affecting Function (Mobility) safety precautions follow-through/compliance;at risk behavior observed;insight into deficits/self-awareness  -     Impairments Affecting Function (Mobility) balance;visual/perceptual;endurance/activity tolerance  -           User Key  (r) = Recorded By, (t) = Taken By, (c) = Cosigned By    Initials Name Provider Type     Cary Jiang OT Occupational Therapist                 Mobility/ADL's     Row Name 06/17/22 0830          Bed Mobility    Bed Mobility bed mobility (all) activities  -     All Activities, [place] (Bed Mobility) standby assist  -     Assistive Device (Bed Mobility) bed rails;head of bed elevated  -[facility] Name 06/17/22 0830          Transfers    Transfers sit-stand transfer;stand-sit transfer;toilet transfer  -     Sit-Stand [place] (Transfers) contact guard;2 person assist  -     Stand-Sit [place] (Transfers) contact guard;2 person assist  -     [place] Level (Toilet Transfer) contact guard;2 person assist  -     Assistive Device (Toilet Transfer) grab bars/safety frame  -[facility] Name 06/17/22 0830          Toilet Transfer    Type (Toilet Transfer) sit-stand;stand-sit  -[facility] Name 06/17/22 0830          Functional Mobility    Functional Mobility- Ind. Level contact guard assist;2 person assist required  Pt ambulated moderate distance throughout hallway utilizing no AD with CGA x2 for safety of Pt.  -     Row Name 06/17/22 0830          Activities of Daily Living    BADL Assessment/Intervention lower body  dressing;upper body dressing;toileting  -[facility] Name 06/17/22 0830          Lower Body Dressing Assessment/Training    [place] Level (Lower Body Dressing) lower body dressing skills;minimum assist (75% patient effort)  -     Position (Lower Body Dressing) edge of bed sitting;unsupported standing  -[facility] Name 06/17/22 0830          Upper Body Dressing Assessment/Training    [place] Level (Upper Body Dressing) upper body dressing skills;supervision  -     Position (Upper Body Dressing) edge of bed sitting  -[facility] Name 06/17/22 0830          Toileting Assessment/Training    [place] Level (Toileting) toileting skills;contact guard assist  -     Position (Toileting) supported standing;unsupported sitting  -           User Key  (r) = Recorded By, (t) = Taken By, (c) = Cosigned By    Initials Name Provider Type    Cary Gilbert OT Occupational Therapist               Obj/Interventions     [facility] Name 06/17/22 0830          Sensory Assessment (Somatosensory)    Sensory Assessment (Somatosensory) sensation intact  -[facility] Name 06/17/22 0830          Vision Assessment/Intervention    Visual Impairment/Limitations WFL;blurry vision  -[facility] Name 06/17/22 0830          Range of Motion Comprehensive    General Range of Motion bilateral upper extremity ROM WNL  -[facility] Name 06/17/22 0830          Strength Comprehensive (MMT)    General Manual Muscle Testing (MMT) Assessment no strength deficits identified  -     Comment, General Manual Muscle Testing (MMT) Assessment BUE strength grossly 4+/5.  -[facility] Name 06/17/22 0830          Motor Skills    Motor Skills coordination  -     Coordination fine motor deficit;minimal impairment  Pt demonstrates minimal impairement in FM skills in L hand; Pt reports that he has some difficulty manipulating buttons when donning/doffing shirts.  -     Row Name 06/17/22 0830          Balance    Balance Assessment sitting static  balance;sitting dynamic balance;standing static balance;standing dynamic balance  -LS     Static Sitting Balance standby assist  -LS     Dynamic Sitting Balance standby assist  -LS     Position, Sitting Balance sitting edge of bed  -LS     Static Standing Balance contact guard  -LS     Dynamic Standing Balance contact guard  -LS           User Key  (r) = Recorded By, (t) = Taken By, (c) = Cosigned By    Initials Name Provider Type    LS Cary Jiang, OT Occupational Therapist               Goals/Plan     Row Name 06/17/22 0830          Transfer Goal 1 (OT)    Activity/Assistive Device (Transfer Goal 1, OT) toilet  -LS     [place] Level/Cues Needed (Transfer Goal 1, OT) modified independence  -LS     Time Frame (Transfer Goal 1, OT) long term goal (LTG);10 days  -LS     Progress/Outcome (Transfer Goal 1, OT) goal ongoing  -     Row Name 06/17/22 0830          Bathing Goal 1 (OT)    Activity/Device (Bathing Goal 1, OT) bathing skills, all  -LS     [place] Level/Cues Needed (Bathing Goal 1, OT) modified independence  -LS     Time Frame (Bathing Goal 1, OT) long term goal (LTG);10 days  -LS     Progress/Outcomes (Bathing Goal 1, OT) goal ongoing  -     Row Name 06/17/22 0830          Dressing Goal 1 (OT)    Activity/Device (Dressing Goal 1, OT) other (see comments)  Donning/doffing of all LB clothing except for socks.  -LS     [place]/Cues Needed (Dressing Goal 1, OT) modified independence  -LS     Time Frame (Dressing Goal 1, OT) long term goal (LTG);10 days  -LS     Progress/Outcome (Dressing Goal 1, OT) goal ongoing  -LS     Row Name 06/17/22 0830          Toileting Goal 1 (OT)    Activity/Device (Toileting Goal 1, OT) toileting skills, all  -LS     [place] Level/Cues Needed (Toileting Goal 1, OT) modified independence  -LS     Time Frame (Toileting Goal 1, OT) long term goal (LTG);10 days  -LS     Row Name 06/17/22 0830          Therapy Assessment/Plan (OT)    Planned Therapy Interventions  (OT) activity tolerance training;occupation/activity based interventions;functional balance retraining;BADL retraining;transfer/mobility retraining;patient/caregiver education/training;ROM/therapeutic exercise  -           User Key  (r) = Recorded By, (t) = Taken By, (c) = Cosigned By    Initials Name Provider Type    LS Cary Jiang OT Occupational Therapist               Clinical Impression     Row Name 06/17/22 0830          Pain Assessment    Pretreatment Pain Rating 0/10 - no pain  -LS     Posttreatment Pain Rating 0/10 - no pain  -LS     Row Name 06/17/22 0830          Plan of Care Review    Plan of Care Reviewed With patient  -LS     Progress no change  -LS     Outcome Evaluation Initial OT evaluation completed on Pt. Pt seated EOB with PT present upon therapist arrival; JUAN drain in place; A&Ox4. Pt performed sit<>stand with CGA x2. Pt ambulated moderate distance throughout hallway with CGA x2. Pt performed sit<>stand toilet transfer utilizing grab bar with CGA x2; Pt required CGA for all other toileting tasks. Pt attempted to don/doff socks, however, Pt dependent for donning/doffing socks due to decreased fxl reach. Pt demonstrates BUE strength WNL as evidenced by BUE MMT grossly 4+/5. Pt educated on safety/fall precautions; Pt verbalizes understanding, but needs reinforcement. Pt requires skilled OT intervention in order to address deficits in fxl mobility, fxl activity tolerance, coordination, and balance during performance of fxl tasks. Recommend discharge home with assistance and HH.  -     Row Name 06/17/22 0830          Therapy Assessment/Plan (OT)    Rehab Potential (OT) good, to achieve stated therapy goals  -     Criteria for Skilled Therapeutic Interventions Met (OT) yes;skilled treatment is necessary  -     Therapy Frequency (OT) 5 times/wk  -     Row Name 06/17/22 0830          Therapy Plan Review/Discharge Plan (OT)    Anticipated Discharge Disposition (OT) home with assist;home with  home health  -     Row Name 06/17/22 0830          Positioning and Restraints    Pre-Treatment Position in bed  -LS     Post Treatment Position bed  -LS     In Bed call light within reach;encouraged to call for assist;patient within staff view;fowlers;side rails up x3;SCD pump applied  -LS           User Key  (r) = Recorded By, (t) = Taken By, (c) = Cosigned By    Initials Name Provider Type    Cary Gilbert OT Occupational Therapist               Outcome Measures     Row Name 06/17/22 0830          How much help from another is currently needed...    Putting on and taking off regular lower body clothing? 3  -LS     Bathing (including washing, rinsing, and drying) 3  -LS     Toileting (which includes using toilet bed pan or urinal) 3  -LS     Putting on and taking off regular upper body clothing 4  -LS     Taking care of personal grooming (such as brushing teeth) 4  -LS     Eating meals 4  -LS     AM-PAC 6 Clicks Score (OT) 21  -LS     Row Name 06/17/22 0805          How much help from another person do you currently need...    Turning from your back to your side while in flat bed without using bedrails? 4 (P)   -JUAN     Moving from lying on back to sitting on the side of a flat bed without bedrails? 3 (P)   -JUAN     Moving to and from a bed to a chair (including a wheelchair)? 3 (P)   -JUAN     Standing up from a chair using your arms (e.g., wheelchair, bedside chair)? 3 (P)   -JUAN     Climbing 3-5 steps with a railing? 3 (P)   -JUAN     To walk in hospital room? 3 (P)   -JUAN     AM-PAC 6 Clicks Score (PT) 19 (P)   -JUAN     Highest level of mobility 6 --> Walked 10 steps or more (P)   -JUAN     Row Name 06/17/22 0830 06/17/22 0805       Functional Assessment    Outcome Measure Options AM-PAC 6 Clicks Daily Activity (OT)  -LS AM-PAC 6 Clicks Basic Mobility (PT) (P)   -JUAN          User Key  (r) = Recorded By, (t) = Taken By, (c) = Cosigned By    Initials Name Provider Type    Kemal Pritchard, PT Student PT Student    LS  Cary Jiang OT Occupational Therapist                Occupational Therapy Education                 Title: PT OT SLP Therapies (In Progress)     Topic: Occupational Therapy (In Progress)     Point: ADL training (Done)     Description:   Instruct learner(s) on proper safety adaptation and remediation techniques during self care or transfers.   Instruct in proper use of assistive devices.              Learning Progress Summary           Patient Acceptance, E, VU,NR by  at 6/17/2022 0929                   Point: Home exercise program (Not Started)     Description:   Instruct learner(s) on appropriate technique for monitoring, assisting and/or progressing therapeutic exercises/activities.              Learner Progress:  Not documented in this visit.          Point: Precautions (Done)     Description:   Instruct learner(s) on prescribed precautions during self-care and functional transfers.              Learning Progress Summary           Patient Acceptance, E, VU,NR by  at 6/17/2022 0929                   Point: Body mechanics (Done)     Description:   Instruct learner(s) on proper positioning and spine alignment during self-care, functional mobility activities and/or exercises.              Learning Progress Summary           Patient Acceptance, E, VU,NR by  at 6/17/2022 0929                               User Key     Initials Effective Dates Name Provider Type Discipline     06/07/22 -  Cary Jiang OT Occupational Therapist OT              OT Recommendation and Plan  Planned Therapy Interventions (OT): activity tolerance training, occupation/activity based interventions, functional balance retraining, BADL retraining, transfer/mobility retraining, patient/caregiver education/training, ROM/therapeutic exercise  Therapy Frequency (OT): 5 times/wk  Plan of Care Review  Plan of Care Reviewed With: patient  Progress: no change  Outcome Evaluation: Initial OT evaluation completed on Pt. Pt seated EOB with PT  present upon therapist arrival; JUAN drain in place; A&Ox4. Pt performed sit<>stand with CGA x2. Pt ambulated moderate distance throughout hallway with CGA x2. Pt performed sit<>stand toilet transfer utilizing grab bar with CGA x2; Pt required CGA for all other toileting tasks. Pt attempted to don/doff socks, however, Pt dependent for donning/doffing socks due to decreased fxl reach. Pt demonstrates BUE strength WNL as evidenced by BUE MMT grossly 4+/5. Pt educated on safety/fall precautions; Pt verbalizes understanding, but needs reinforcement. Pt requires skilled OT intervention in order to address deficits in fxl mobility, fxl activity tolerance, coordination, and balance during performance of fxl tasks. Recommend discharge home with assistance and HH.     Time Calculation:    Time Calculation- OT     Row Name 06/17/22 0929             Time Calculation- OT    OT Start Time 0830  +10 minutes chart review  -      OT Stop Time 0913  -      OT Time Calculation (min) 43 min  -      OT Non-Billable Time (min) 53 min  -      OT Received On 06/17/22  -      OT Goal Re-Cert Due Date 06/27/22  -            User Key  (r) = Recorded By, (t) = Taken By, (c) = Cosigned By    Initials Name Provider Type     Cary Jiang OT Occupational Therapist              Therapy Charges for Today     Code Description Service Date Service Provider Modifiers Qty    [id number]  OT EVAL MOD COMPLEXITY 4 6/17/2022 Cary Jiang OT GO 1               Cary Jiang OT  6/17/2022

## 2022-06-17 NOTE — PLAN OF CARE
Goal Outcome Evaluation:  Plan of Care Reviewed With: patient        Progress: no change  Outcome Evaluation: Initial OT evaluation completed on Pt. Pt seated EOB with PT present upon therapist arrival; JUAN drain in place; A&Ox4. Pt performed sit<>stand with CGA x2. Pt ambulated moderate distance throughout hallway with CGA x2. Pt performed sit<>stand toilet transfer utilizing grab bar with CGA x2; Pt required CGA for all other toileting tasks. Pt attempted to don/doff socks, however, Pt dependent for donning/doffing socks due to decreased fxl reach. Pt demonstrates BUE strength WNL as evidenced by BUE MMT grossly 4+/5. Pt educated on safety/fall precautions; Pt verbalizes understanding, but needs reinforcement. Pt requires skilled OT intervention in order to address deficits in fxl mobility, fxl activity tolerance, coordination, and balance during performance of fxl tasks. Recommend discharge home with assistance and HH.

## 2022-06-17 NOTE — OP NOTE
NEUROSURGERY OPERATIVE NOTE    Elijah Escobar  OR Date: 6/17/2022    Pre-op Diagnosis:   Meningioma (HCC) [D32.9]    Post-op Diagnosis:     Post-Op Diagnosis Codes:     * Meningioma (HCC) [D32.9]          Surgeon(s):  Flaco Portillo MD    Anesthesia: General    Staff:   Circulator: Adrian Petty, SELENA; Ricky Sams, RN; Nora Florez RN  Scrub Person: Fifi Wooten; Millie Ureña  Vendor Representative: Jason Sarmiento  Assistant: Marianna Samano    Procedure(s):  CRANIOTOMY FOR TUMOR STERIOTACTIC WITH BRAIN LAB, right    INDICATIONS FOR PROCEDURE:   Elijah Escobar is a 66 y.o. male with a significant comorbidity of coronary artery disease, diabetes, erectile dysfunction, GERD, hypertension, hyperlipidemia. He presents with a new problem of 2 episodes of intermittent amnesia suggestive of seizures. Physical exam findings of left pronator drift and dysmetria with left finger-nose and MMSE 25/30, otherwise neurologically intact.  His imaging shows avidly contrast-enhancing meningeal based mass with dural tails measuring 2.8 x 2.8 x 2.0 cm with surrounding FLAIR signal.    We discussed the risks of a  bifrontal craniotomy with stereotactic localization, including but not limited to infection, bleeding, damage to local structures, CSF leak, seizures, hydrocephalus, weakness, numbness, paralysis, or loss of bowel and bladder function, stroke, coma, MI, or death.    DESCRIPTION OF OPERATION AND FINDINGS:   On the day of surgery, he was brought to the preoperative holding area where IV access was obtained. Prophylactic intravenous antibiotics were administered. He was then brought to the major operative suite. While on the [facility], the patient underwent an uneventful induction of general anesthetic with placement of endotracheal tube, TEDs, SCD hoses, and a Jeong catheter were applied.      The patient was placed in the supine position on a standard operating table.  All pressure points were inspected and  appropriately padded, and he was secured to the table.  The head was turned slightly to the left. And the patient was placed in a [place] 3-point fixation head carter to at least 75lbs.      The BrainLab frame was then attached to the [place] head carter and secured.  Surface mapping was used to co-register the patient with the fused MRI image.  Accuracy was confirmed by checking the external auditory canal, medial, and lateral canthus of the eye.      The hair was then clipped along the incision line. The entire scalp was prepped with alcohol, Betadine, and DuraPrep and allowed to dry for 3 minutes.  He was then draped in the usual fashion.  At this point a WHO surgical timeout was performed with all members of the surgical team.      The skin was infiltrated with quarter percent Marcaine with epinephrine.  A bicoronal skin incision was made with a #15 scalpel.  Skin incision and Bovie cautery were then used to go through the galea but stopped just shy of the cranial periosteum.  Cintia clips were then placed along the skin.  The flap was then mobilized in a subgaleal plane anteriorly and posteriorly.  This was done until we encountered a fat pad overlying the temporalis muscle on the right.  To prevent destruction of the facial nerve the temporalis fascia and muscle was then incised.  Next care was taken to lift the periosteum taking care not to perforate it.  [place] tissue retractors were used keep the flap  and the lap was placed underneath the frontal scalp to avoid pressure on the eyes or necrosis of the flap.  Wet lap was then placed over the periosteum.  Next BrainLab was brought back into the field and a craniotomy flap was planned just slightly larger than the tumor.    8 stephanie holes were made with a craniotome and connected using a footplate.  Due to the tumors close approximation, 5 mm, of the superior sagittal sinus we decided to cross the superior sagittal sinus.  This was done by placing  bur holes on either side of the superior sagittal sinus and then undermining with a dural separator until this was free.  The dural sinus was then held out of the way as a footplate was used to cross the superior sagittal sinus.  The bone flap was placed aside and soaked in irrigation.  The dura was then opened with a 15 blade and Metzenbaum scissors.  This was then folded towards the midline.  And it was tacked in place with Nurolon and care was taken not to over tighten against the superior sagittal sinus.      The mass consistent with meningioma was found originating from the dura.  Gentle traction was used and cottonoids were placed on the outside of the tumor in a circumferential manner.  This was sequentially done until the tumor was fully free.  Once it could be fully elevated then a 15 blade and Metzenbaums were used to disconnect the tumor from the superior sagittal sinus.  A small amount of bleeding was seen from the dura.  This was easily controlled with bipolar coagulation.  Gross total resection of the tumor occurred.  The edges of the dura were then bipolared.    The operating microscope we then confirmed complete gross total resection.      The cavity was then filled with FloSeal or Avitene for 5 minutes then gently irrigated multiple times with irrigation. Once meticulous hemostasis was obtained the patient's blood pressure was elevated for 5 minutes to ensure hemostasis.  The surgical cavity was then lined with Surgicel.   DuraGen was laid over the dural defect. And epidural tack up sutures were placed.  A 7 Swedish flat JUAN was placed in the epidural space and tunneled backwards.    The bone flap was then replaced and secured with the Asael titanium plate system.  Again the extracranial space was thoroughly irrigated with 1 L of irrigation.  Next a second 7 Swedish flat JUAN was placed in the subgaleal space and tunneled posteriorly.  The muscle fascia galea was closed with interrupted 2-0 Vicryl  sutures.  4-0 Monocryl was used for skin closure.  Incision was dressed with Xeroform and the patient's head was wrapped..    The patient was then removed from the Jo head carter.  All pin sites were inspected for bleeding.  The patient was awoken and found to be at his neurological baseline.    There were no observed complications. The needle, sponge, and cottonoid counts were correct.        Estimated Blood Loss: 200ml    Complications: None    Implants:   Implant Name Type Inv. Item Serial No.  Lot No. LRB No. Used Action   CLIPAPPLR SCLP HEMO PRELD 1P/U STRL - [id number] Implant CLIPAPPLR SCLP HEMO PRELD 1P/U STRL  [employer] J [employer] J  Right 1 Implanted   HEMOST ABS SURGICEL 4X8IN - [id number] Implant HEMOST ABS SURGICEL 4X8IN  [employer]  DIV OF J AND J [id number] Right 1 Implanted   HEMOST ABS SURGIFOAM  8X12 10MM - [id number] Implant HEMOST ABS SURGIFOAM  8X12 10MM  [employer]  DIV OF J AND J [id number] Right 2 Implanted   KT HEMOST ABS SURGIFOAM PORCN 1GRAM - [id number] Implant KT HEMOST ABS SURGIFOAM PORCN 1GRAM  [employer]  DIV OF J AND J [id number] Right 2 Implanted   CLIPAPPLR SCLP HEMO PRELD 1P/U STRL - [id number] Implant CLIPAPPLR SCLP HEMO PRELD 1P/U STRL  [employer] DIV [employer] J AND J [id number] Right 1 Wasted   DURALMATRIX DURAGEN PLS 3X3IN EA/5 - [id number] Implant DURALMATRIX DURAGEN PLS 3X3IN EA/5  [employer] [id number] Right 1 Implanted   PLT BURHL LEFORTE PRE/CONTRD TI 6HL 22X0.6MM SLVR - [id number] Implant PLT BURHL LEFORTE PRE/CONTRD TI 6HL 22X0.6MM SLVR  [employer]  Right 3 Implanted   PLT BURHL LEFORTE PRE/CONTRD TI 6HL 15X0.6MM SLVR - [id number] Implant PLT BURHL LEFORTE PRE/CONTRD TI 6HL 15X0.6MM SLVR  [employer]  Right 2 Implanted   PLT BURHL LEFORTE PRE/CONTRD TI 5HL 0.3X15MM GRN - [id number] Implant PLT BURHL LEFORTE PRE/CONTRD TI 5HL 0.3X15MM GRN  [employer]  Right 1 Implanted   SCRW PLT NEURO LEFORTE L/P SLF/DRL 1.4X3.7MM SLVR - [id number] Implant SCRW PLT  NEURO LEFORTE L/P SLF/DRL 1.4X3.7MM SLVR  [employer]  Right 26 Implanted       Specimens:                Specimens     ID Source Type Tests Collected By Collected At Frozen?    A Brain Tissue · TISSUE PATHOLOGY EXAM  · SURGICAL PATHOLOGY EXAM   Flaco Portillo MD 6/16/22 1102 No    Description: BRAIN TUMOR FOR PERMANENT            Drains:   Closed/Suction Drain 1 Scalp Bulb (Active)   Site Description Unable to view 06/17/22 0400   Dressing Status Clean;Dry;Intact 06/17/22 0400   Drainage Appearance Serosanguineous 06/17/22 0400   Status To bulb suction 06/17/22 0400   Output (mL) 45 mL 06/17/22 0914       [REMOVED] Urethral Catheter Non-latex;Silicone 16 Fr. (Removed)   Daily Indications Selected surgeries ( tract, abdomen) 06/16/22 1339   Site Assessment Clean;Skin intact 06/16/22 1339   Collection Container Standard drainage bag 06/16/22 1339   Securement Method Securing device 06/16/22 1339   Output (mL) 1100 mL 06/16/22 1233

## 2022-06-17 NOTE — PROGRESS NOTES
NEUROSURGERY DAILY PROGRESS NOTE    ASSESSMENT:   Elijah Escobar is a 66 y.o. with a significant comorbidity of coronary artery disease, diabetes, erectile dysfunction, GERD, hypertension, hyperlipidemia. He presents with a new problem of 2 episodes of intermittent amnesia suggestive of seizures. Physical exam findings of left pronator drift and dysmetria with left finger-nose otherwise neurologically intact.  His imaging shows avidly contrast-enhancing meningeal based mass with dural tails measuring 2.8 x 2.8 x 2.0 cm with surrounding FLAIR signal.    Past Medical History:   Diagnosis Date   • Brain tumor (HCC)    • Coronary artery disease    • COVID-19 vaccine series completed     MODERNA X 3; LAST DOSE 3/2022   • Diabetes (HCC)    • Erectile dysfunction    • GERD (gastroesophageal reflux disease)    • Hypercholesteremia    • Hypertension      Active Hospital Problems    Diagnosis    • **Meningioma (HCC)    • Hypertension    • GERD (gastroesophageal reflux disease)    • Coronary artery disease    • Morbid obesity (HCC)    • Type 2 diabetes mellitus with hyperglycemia and peripheral neuropathy, with long-term current use of insulin (HCC)    • Leukocytosis    • Other specified anemias      PLAN:   Neuro: Stable.  Bright and awake.  Oriented by 3.  Follows commands without prompting showing thumbs up and 2 fingers bilaterally.  No pronator drift or dysmetria.    1 Day Post-Op (6/16/2022) status post craniotomy for tumor   Pathology pending   Postop CT head stable   JUAN = 235 mL, keep   MRI brain today   Continue Keppra   Decadron   Remain in ICU for continued neurologic monitoring   Neurochecks per policy.  Call for decline     CV: Continuous cardiac monitoring.  Keep A-line.     Requiring Cardene to keep SBP <140.  Hospitalist consulted.  Appreciate assist  Pulm: Maintaining O2 sat.  Continuous pulse oximetry.  Incentive spirometry.   : Voiding spontaneously.  Bladder scans and I/O cath per policy.   FEN: Tolerating  "regular diet.     GI: No acute issues.    ID: 23-hour postoperative prophylactic antibiotics.    Heme:  DVT prophylaxis; SCD's  Pain: Tolerable at present with oral meds.     Dispo: PT/OT.       CHIEF COMPLAINT:   Episodic seizures    Subjective  Symptom stable.  Doing well    Temp:  [98.1 °F (36.7 °C)-98.9 °F (37.2 °C)] 98.5 °F (36.9 °C)  Heart Rate:  [] 72  Resp:  [15-20] 15  BP: (119-205)/(65-99) 119/67  Arterial Line BP: ()/(43-74) 123/60    Objective:  General Appearance:  In no acute distress.    Vital signs: (most recent): Blood pressure 119/67, pulse 72, temperature 98.5 °F (36.9 °C), temperature source Oral, resp. rate 15, height 178 cm (70.08\"), weight 128 kg (281 lb 12 oz), SpO2 94 %.      Neurologic Exam     Mental Status   Oriented to person.   Disoriented to place.   Oriented to year.   Attention: normal. Concentration: normal.   Speech: speech is normal   Level of consciousness: alert    Bright and awake.  Oriented by 3.  Follows commands without prompting showing thumbs up and 2 fingers bilaterally.  No pronator drift or dysmetria.      Cranial Nerves     CN II   Visual fields full to confrontation.     CN III, IV, VI   Pupils are equal, round, and reactive to light.  Extraocular motions are normal.     CN V   Facial sensation intact.     CN VII   Facial expression full, symmetric.     CN VIII   CN VIII normal.     CN IX, X   CN IX normal.     CN XI   CN XI normal.     Motor Exam   Right arm tone: normal  Left arm tone: normal  Right leg tone: normal  Left leg tone: normal    Strength   Right deltoid: 5/5  Left deltoid: 5/5  Right biceps: 5/5  Left biceps: 5/5  Right triceps: 5/5  Left triceps: 5/5  Right wrist extension: 5/5  Left wrist extension: 5/5  Right iliopsoas: 5/5  Left iliopsoas: 5/5  Right quadriceps: 5/5  Left quadriceps: 5/5  Right anterior tibial: 5/5  Left anterior tibial: 5/5  Right gastroc: 5/5  Left gastroc: 5/5  Right EHL 5/5  Left EHL 5/5       Sensory Exam   Right " arm light touch: normal  Left arm light touch: normal  Right leg light touch: normal  Left leg light touch: normal    Gait, Coordination, and Reflexes     Tremor   Resting tremor: absent  Intention tremor: absent  Action tremor: absent    Drains:   Closed/Suction Drain 1 Scalp Bulb (Active)   Site Description Unable to view 06/16/22 1240   Dressing Status Clean;Dry;Intact 06/16/22 1240   Drainage Appearance Bloody 06/16/22 1240   Status To bulb suction 06/16/22 1240       Urethral Catheter Non-latex;Silicone 16 Fr. (Active)   Daily Indications Selected surgeries ( tract, abdomen) 06/16/22 1240   Site Assessment Clean;Skin intact 06/16/22 1233   Collection Container Standard drainage bag 06/16/22 1240   Securement Method Securing device 06/16/22 1240   Output (mL) 1100 mL 06/16/22 1233       Output by Drain (mL) 06/16/22 0701 - 06/16/22 1900 06/16/22 1901 - 06/17/22 0700 06/17/22 0701 - 06/17/22 0929 Range Total   Closed/Suction Drain 1 Scalp Bulb 95 140 45 280       Imaging Results (Last 24 Hours)     Procedure Component Value Units Date/Time    CT Head Without Contrast [[id number]] Collected: 06/16/22 1737     Updated: 06/16/22 1746    Narrative:      EXAMINATION:  CT HEAD WO CONTRAST-  6/16/2022 5:31 PM CDT     HISTORY: Evaluation status post craniotomy for tumor removal;  D32.9-Benign neoplasm of meninges, unspecified     TECHNIQUE: Multiple axial images were obtained through the brain without  contrast infusion. Multiplanar images were reconstructed.     DLP: 2140 mGy-cm. Automated dosage reduction technique was utilized to  reduce patient dosage.     COMPARISON: 3/25/2022.     FINDINGS: There has been craniotomy on the right in the frontal region.  A drain extends into the subdural space. There is a small amount of  hemorrhage in the subdural space. There is air in the dural space. There  is residual edema in the right frontal white matter. There is mild  sulcal effacement in the right frontal region. There is  continued mild  compression of the right lateral ventricle. No parenchymal hemorrhage is  observed.       Impression:      1. Status post craniotomy on the right with resection of a tumor from  the frontal dural space. A subdural drain is located in this area. There  is air in the subdural space. There is minimal hemorrhage in the  subdural space.  2. Vasogenic edema in the right frontal white matter remains stable.  There is continued mild mass effect with sulcal effacement and mild  compression of the right lateral ventricle.     This report was finalized on 06/16/2022 17:43 by Dr. Arian Stauffer MD.        Lab Results (last 24 hours)     Procedure Component Value Units Date/Time    POC Glucose Once [[id number]]  (Abnormal) Collected: 06/17/22 0732    Specimen: Blood Updated: 06/17/22 0744     Glucose 301 mg/dL      Comment: : [id number] Lori Carter ID: [id number]       Basic Metabolic Panel [[id number]]  (Abnormal) Collected: 06/17/22 0249    Specimen: Blood Updated: 06/17/22 0322     Glucose 290 mg/dL      BUN 15 mg/dL      Creatinine 0.98 mg/dL      Sodium 137 mmol/L      Potassium 4.1 mmol/L      Chloride 103 mmol/L      CO2 22.0 mmol/L      Calcium 8.6 mg/dL      BUN/Creatinine Ratio 15.3     Anion Gap 12.0 mmol/L      eGFR 85.0 mL/min/1.73      Comment: National Kidney Foundation and American Society of Nephrology (ASN) Task Force recommended calculation based on the Chronic Kidney Disease Epidemiology Collaboration (CKD-EPI) equation refit without adjustment for race.       Narrative:      GFR Normal >60  Chronic Kidney Disease <60  Kidney Failure <15      CBC & Differential [[id number]]  (Abnormal) Collected: 06/17/22 0249    Specimen: Blood Updated: 06/17/22 0303    Narrative:      The following orders were created for panel order CBC & Differential.  Procedure                               Abnormality         Status                     ---------                               -----------          ------                     CBC Auto Differential[[id number]]        Abnormal            Final result                 Please view results for these tests on the individual orders.    CBC Auto Differential [[id number]]  (Abnormal) Collected: 06/17/22 0249    Specimen: Blood Updated: 06/17/22 0303     WBC 14.20 10*3/mm3      RBC 4.01 10*6/mm3      Hemoglobin 12.6 g/dL      Hematocrit 38.4 %      MCV 95.8 fL      MCH 31.4 pg      MCHC 32.8 g/dL      RDW 12.3 %      RDW-SD 43.2 fl      MPV 10.7 fL      Platelets 212 10*3/mm3      Neutrophil % 84.5 %      Lymphocyte % 11.8 %      Monocyte % 2.9 %      Eosinophil % 0.1 %      Basophil % 0.1 %      Immature Grans % 0.6 %      Neutrophils, Absolute 11.99 10*3/mm3      Lymphocytes, Absolute 1.68 10*3/mm3      Monocytes, Absolute 0.41 10*3/mm3      Eosinophils, Absolute 0.01 10*3/mm3      Basophils, Absolute 0.02 10*3/mm3      Immature Grans, Absolute 0.09 10*3/mm3      nRBC 0.0 /100 WBC     POC Glucose Once [[id number]]  (Abnormal) Collected: 06/16/22 1643    Specimen: Blood Updated: 06/16/22 1654     Glucose 284 mg/dL      Comment: : [id number] English SethMeter ID: [id number]       Tissue Pathology Exam [[id number]] Collected: 06/16/22 1102    Specimen: Tissue from Brain Updated: 06/16/22 1524    POC Glucose Once [[id number]]  (Abnormal) Collected: 06/16/22 1237    Specimen: Blood Updated: 06/16/22 1248     Glucose 176 mg/dL      Comment: : [id number] Kp Carcamo ID: [id number]           [id number]  Rahul Arnold, APRN

## 2022-06-17 NOTE — CASE MANAGEMENT/SOCIAL WORK
Discharge Planning Assessment  [facility]     Patient Name: Elijah Escobar  MRN: [id number]  Today's Date: 6/17/2022    Admit Date: 6/16/2022     Discharge Needs Assessment     Row Name 06/17/22 1203       Living Environment    People in Home spouse    Name(s) of People in Home Ruth Ann    Current Living Arrangements home    Primary Care Provided by spouse/significant other    Provides Primary Care For no one    Family Caregiver if Needed spouse    Family Caregiver Names Ruth Ann    Able to Return to Prior Arrangements yes       Resource/Environmental Concerns    Resource/Environmental Concerns none       Transition Planning    Transportation Anticipated family or friend will provide       Discharge Needs Assessment    Readmission Within the Last 30 Days no previous admission in last 30 days    Equipment Currently Used at Home none    Concerns to be Addressed no discharge needs identified    Equipment Needed After Discharge none    Discharge Coordination/Progress spoke to patient; patient lives with spouse; has RX coverage and PCP; independent at home prior to illness will follow for DC needs               Discharge Plan    No documentation.               Continued Care and Services - Admitted Since 6/16/2022    Coordination has not been started for this encounter.          Demographic Summary    No documentation.                Functional Status    No documentation.                Psychosocial    No documentation.                Abuse/Neglect    No documentation.                Legal    No documentation.                Substance Abuse    No documentation.                Patient Forms    No documentation.                   Ivett Yee RN

## 2022-06-17 NOTE — PLAN OF CARE
Goal Outcome Evaluation:  Plan of Care Reviewed With: patient        Progress: no change  Outcome Evaluation: PT eval complete. A&Ox4. Pt was ready to get up and move so he could also go to the bathroom. JUAN drain in place. Pt was in fowlers position upon arrival. Pt was SBAx1 when performing supine-sit transfer. Pt performed STS transfer with CGAx2. Pt voided before ambulating 200 ft around unit before returning to supine position. Pt will continue to benefit from skilled PT interrvention in order to increase activity tolerance and endurance with ambulation/strength. Anticipate d.c to home with assist.

## 2022-06-17 NOTE — THERAPY EVALUATION
Patient Name: Elijah Escobar  : 1955    MRN: [id number]                              Today's Date: 2022       Admit Date: 2022    Visit Dx:     ICD-10-CM ICD-9-CM   1. Meningioma (HCC)  D32.9 225.2   2. Impaired mobility and ADLs  Z74.09 V49.89    Z78.9    3. Impaired mobility  Z74.09 799.89     Patient Active Problem List   Diagnosis   • Meningioma (HCC)   • Hypertension   • GERD (gastroesophageal reflux disease)   • Coronary artery disease   • Morbid obesity (HCC)   • Type 2 diabetes mellitus with hyperglycemia and peripheral neuropathy, with long-term current use of insulin (HCC)   • Leukocytosis   • Other specified anemias     Past Medical History:   Diagnosis Date   • Brain tumor (HCC)    • Coronary artery disease    • COVID-19 vaccine series completed     MODERNA X 3; LAST DOSE 3/2022   • Diabetes (HCC)    • Erectile dysfunction    • GERD (gastroesophageal reflux disease)    • Hypercholesteremia    • Hypertension      Past Surgical History:   Procedure Laterality Date   • BALLOON ANGIOPLASTY, ARTERY Right    • COLONOSCOPY        General Information     Row Name 22 [facility]          Physical Therapy Time and Intention    Document Type evaluation  Pt presents to PT s/p craniotomy for tumor steriotactic with Brain Lab (R). PMH includes diabetic neuropathy, CAD, ED, GERD. HTN, hyperlipidemia.  -JESSICA (r) JUAN (t) JESSICA (c)     Mode of Treatment physical therapy  -JESSICA (r) JUAN (t) JESSICA (c)     Row Name 22 [facility]          General Information    Patient Profile Reviewed yes  -JESSICA (r) JUAN (t) JESSICA (c)     Prior Level of Function independent:;all household mobility;ADL's;home management;driving;min assist:;cleaning;dressing  -JESSICA (r) JUAN (t) JESSICA (c)     Existing Precautions/Restrictions fall  JUAN drain  -JESSICA (r) JUAN (t) JESSICA (c)     Barriers to Rehab medically complex  -JESSICA (r) JUAN (t) JESSICA (c)     Row Name 22 [facility]          Living Environment    People in Home spouse  -JESSICA (r) JUAN (t) JESSICA (c)     Row Name 22 [facility]           Home Main Entrance    Number of Stairs, Main Entrance three  -JESSICA (r) JUAN (t) JESSICA (c)     Stair Railings, Main Entrance railing on left side (ascending)  -JESSICA (r) JUAN (t) JESSICA (c)     Row Name 06/17/22 08          Stairs Within Home, Primary    Number of Stairs, Within Home, Primary none  -JESSICA (r) JUAN (t) JESSICA (c)     Row Name 06/17/22 08          Cognition    Orientation Status (Cognition) oriented x 4;person;place;situation;time  -JESSICA (r) JUAN (t) JESSICA (c)     Row Name 06/17/22 08          Safety Issues, Functional Mobility    Impairments Affecting Function (Mobility) balance;endurance/activity tolerance;coordination  -JESSICA (r) JUAN (t) JESSICA (c)           User Key  (r) = Recorded By, (t) = Taken By, (c) = Cosigned By    Initials Name Provider Type    Bruce Meehan, PT DPT Physical Therapist    Kemal Pritchard, PT Student PT Student               Mobility     Row Name 06/17/22 0805          Bed Mobility    Bed Mobility supine-sit;sit-supine  -JESSICA (r) JUAN (t) JESSICA (c)     Supine-Sit [place] (Bed Mobility) standby assist;1 person assist  -JESSICA (r) JUAN (t) JESSICA (c)     Sit-Supine [place] (Bed Mobility) standby assist;1 person assist  -JESSICA (r) JUAN (t) JESSICA (c)     Row Name 06/17/22 08          Bed-Chair Transfer    Bed-Chair [place] (Transfers) not tested  -JESSICA (r) JUAN (t) JESSICA (c)     Row Name 06/17/22 08          Sit-Stand Transfer    Sit-Stand [place] (Transfers) contact guard;2 person assist  -JESSICA (r) JUAN (t) JESSICA (c)     Row Name 06/17/22 08          Gait/Stairs (Locomotion)    [place] Level (Gait) contact guard;1 person assist  -JESSICA (r) JUAN (t) JESSICA (c)     Distance in Feet (Gait) 200 ft  -JESSICA (r) JUAN (t) JESSICA (c)     Deviations/Abnormal Patterns (Gait) gait speed decreased;stride length decreased  -JESSICA (r) JUAN (t) JESSICA (c)           User Key  (r) = Recorded By, (t) = Taken By, (c) = Cosigned By    Initials Name Provider Type    Bruce Meehan, PT DPT Physical Therapist    Kemal Pritchard, BELEN Student PT  Student               Obj/Interventions     [facility] Name 06/17/22 [facility]          Range of Motion Comprehensive    General Range of Motion bilateral lower extremity ROM WNL  -JESSICA (r) JUAN (t) JESSICA (c)     Row Name 06/17/22 [facility]          Strength Comprehensive (MMT)    Comment, General Manual Muscle Testing (MMT) Assessment BLE strength grossly 4+/5  -JESSICA (r) JUAN (t) JESSICA (c)     Row Name 06/17/22 [facility]          Motor Skills    Motor Skills coordination  -JESSICA (r) JUAN (t) JESSICA (c)     Coordination fine motor deficit;minimal impairment;left;upper extremity  -JESSICA     Row Name 06/17/22 [facility]          Balance    Balance Assessment sitting static balance;sitting dynamic balance;sit to stand dynamic balance;standing static balance;standing dynamic balance  -JESSICA (r) JUAN (t) JESSICA (c)     Static Sitting Balance standby assist  -JESSICA (r) JUAN (t) JESSICA (c)     Dynamic Sitting Balance standby assist  -JESSICA (r) JUAN (t) JESSICA (c)     Position, Sitting Balance unsupported;sitting edge of bed  -JESSICA (r) JUAN (t) JESSICA (c)     Sit to Stand Dynamic Balance contact guard;2-person assist  -JESSICA (r) JUAN (t) JESSICA (c)     Static Standing Balance contact guard;1-person assist;1 person to manage equipment  -JESSICA (r) JUAN (t) JESSICA (c)     Dynamic Standing Balance contact guard;1-person assist;1 person to manage equipment  -JESSICA (r) JUAN (t) JESSICA (c)     Balance Interventions sitting;standing;sit to stand;static;dynamic  -JESSICA (r) JUAN (t) JESSICA (c)     Row Name 06/17/22 [facility]          Sensory Assessment (Somatosensory)    Sensory Assessment (Somatosensory) other (see comments)  Sensation intact but R side L2-3 dermatome slightly diminished  -JESSICA (r) JUAN (t) JESSICA (c)           User Key  (r) = Recorded By, (t) = Taken By, (c) = Cosigned By    Initials Name Provider Type    Bruce Meehan, PT DPT Physical Therapist    Kemal Pritchard, PT Student PT Student               Goals/Plan     Row Name 06/17/22 [facility]          Bed Mobility Goal 1 (PT)    Activity/Assistive Device (Bed Mobility Goal 1, PT) sit to  supine;supine to sit  -JESSICA (r) JUAN (t) JESSICA (c)     [place] Level/Cues Needed (Bed Mobility Goal 1, PT) independent  -JESSICA (r) JUAN (t) JESSICA (c)     Time Frame (Bed Mobility Goal 1, PT) long term goal (LTG);10 days  -JESSICA (r) JUAN (t) JESSICA (c)     Progress/Outcomes (Bed Mobility Goal 1, PT) goal ongoing  -JESSICA (r) JUAN (t) JESSICA (c)     Row Name 06/17/22 0805          Transfer Goal 1 (PT)    Activity/Assistive Device (Transfer Goal 1, PT) sit-to-stand/stand-to-sit;bed-to-chair/chair-to-bed  -JESSICA (r) JUAN (t) JESSICA (c)     [place] Level/Cues Needed (Transfer Goal 1, PT) independent  -JESSICA (r) JUAN (t) JESSICA (c)     Time Frame (Transfer Goal 1, PT) long term goal (LTG);10 days  -JESSICA (r) JUAN (t) JESSICA (c)     Progress/Outcome (Transfer Goal 1, PT) goal ongoing  -JESSICA (r) JUAN (t) JESSICA (c)     Row Name 06/17/22 0805          Gait Training Goal 1 (PT)    Activity/Assistive Device (Gait Training Goal 1, PT) gait (walking locomotion);decrease fall risk;improve balance and speed;increase endurance/gait distance;increase energy conservation  -JESSICA (r) JUAN (t) JESSICA (c)     [place] Level (Gait Training Goal 1, PT) independent  -JESSICA (r) JUAN (t) JESSICA (c)     Distance (Gait Training Goal 1, PT) 300 ft  -JESSICA (r) JUAN (t) JESSICA (c)     Time Frame (Gait Training Goal 1, PT) long term goal (LTG);10 days  -JESSICA (r) JUAN (t) JESSICA (c)     Progress/Outcome (Gait Training Goal 1, PT) goal ongoing  -JESSICA (r) JUAN (t) JESSICA (c)     Row Name 06/17/22 0805          Stairs Goal 1 (PT)    Activity/Assistive Device (Stairs Goal 1, PT) ascending stairs;descending stairs  -JESSICA (r) JUAN (t) JESSICA (c)     [place] Level/Cues Needed (Stairs Goal 1, PT) independent  -JESSICA (r) JUAN (t) JESSICA (c)     Number of Stairs (Stairs Goal 1, PT) 3  -JESSICA (r) JUAN (t) JESSICA (c)     Time Frame (Stairs Goal 1, PT) long term goal (LTG);10 days  -JESSICA (r) JUAN (t) JESSICA (c)     Progress/Outcome (Stairs Goal 1, PT) goal ongoing  -JESSICA (r) JUAN (t) JESSICA (c)     Row Name 06/17/22 0805          Therapy Assessment/Plan (PT)    Planned Therapy Interventions  (PT) balance training;bed mobility training;gait training;motor coordination training;neuromuscular re-education;patient/family education;home exercise program;stair training;strengthening;transfer training  -JESSICA (r) JUAN (t) JESSICA (c)           User Key  (r) = Recorded By, (t) = Taken By, (c) = Cosigned By    Initials Name Provider Type    Bruce Meehan, PT DPT Physical Therapist    Kemal Pritchard, PT Student PT Student               Clinical Impression     Row Name 06/17/22 0805          Pain    Pretreatment Pain Rating 0/10 - no pain  -JESSICA (r) JUAN (t) JESSICA (c)     Pain Intervention(s) --  -JESSICA     Row Name 06/17/22 0805          Plan of Care Review    Plan of Care Reviewed With patient  -JESSICA (r) JUAN (t) JESSICA (c)     Progress no change  -JESSICA (r) JUAN (t) JESSICA (c)     Outcome Evaluation PT eval complete. A&Ox4. Pt was ready to get up and move so he could also go to the bathroom. JUAN drain in place. Pt was in fowlers position upon arrival. Pt was SBAx1 when performing supine-sit transfer. Pt performed STS transfer with CGAx2. Pt voided before ambulating 200 ft around unit before returning to supine position. Pt will continue to benefit from skilled PT interrvention in order to increase activity tolerance and endurance with ambulation/strength. Anticipate d.c to home with assist.  -JESSICA (r) JUAN (t) JESSICA (c)     Row Name 06/17/22 0805          Therapy Assessment/Plan (PT)    Patient/Family Therapy Goals Statement (PT) increase activity tolerance/endurance  -JESSICA (r) JUAN (t) JESSICA (c)     Rehab Potential (PT) good, to achieve stated therapy goals  -JESSICA (r) JUAN (t) JESSICA (c)     Criteria for Skilled Interventions Met (PT) yes;skilled treatment is necessary  -JESSICA (r) JUAN (t) JESSICA (c)     Therapy Frequency (PT) 2 times/day  -JESSICA (r) JUAN (t) JESSICA (c)     Predicted Duration of Therapy Intervention (PT) until d.c  -JESSICA (r) JUAN (t) JESSICA (c)     Row Name 06/17/22 0805          Vital Signs    Pre Systolic BP Rehab 122  -JESSICA (r) JUAN (t) JESSICA (c)     Pre Treatment  Diastolic BP 58  -JESSICA (r) JUAN (t) JESSICA (c)     Pretreatment Heart Rate (beats/min) 83  -JESSICA (r) JUAN (t) JESSICA (c)     Pretreatment Resp Rate (breaths/min) 16  -JESSICA (r) JUAN (t) JESSICA (c)     Pre SpO2 (%) 94  -JESSICA (r) JUAN (t) JESSICA (c)     O2 Delivery Pre Treatment room air  -JESSICA (r) JUAN (t) JESSICA (c)     O2 Delivery Intra Treatment room air  -JESSICA (r) JUAN (t) JESSICA (c)     O2 Delivery Post Treatment room air  -JESSICA (r) JUAN (t) JESSICA (c)     Pre Patient Position Supine  -JESSICA (r) JUAN (t) JESSICA (c)     Intra Patient Position Standing  -JESSICA (r) JUAN (t) JESSICA (c)     Post Patient Position Supine  -JESSICA (r) JUAN (t) JESSICA (c)     Rest Breaks  1  -JESSICA (r) JUAN (t) JESSICA (c)     Row Name 06/17/22 0805          Positioning and Restraints    Pre-Treatment Position in bed  -JESSICA (r) JUAN (t) JESSICA (c)     Post Treatment Position bed  -JESSICA (r) JUAN (t) JESSICA (c)     In Bed supine;call light within reach;encouraged to call for assist;patient within staff view  -JESSICA (r) JUAN (t) JESSICA (c)           User Key  (r) = Recorded By, (t) = Taken By, (c) = Cosigned By    Initials Name Provider Type    Bruce Meehan, PT DPT Physical Therapist    Kemal Pritchard, BELEN Student PT Student               Outcome Measures     Row Name 06/17/22 0805          How much help from another person do you currently need...    Turning from your back to your side while in flat bed without using bedrails? 4  -JESSICA (r) JUAN (t) JESSICA (c)     Moving from lying on back to sitting on the side of a flat bed without bedrails? 3  -JESSICA (r) JUAN (t) JESSICA (c)     Moving to and from a bed to a chair (including a wheelchair)? 3  -JESSICA (r) JUAN (t) JESSICA (c)     Standing up from a chair using your arms (e.g., wheelchair, bedside chair)? 3  -JESSICA (r) JUAN (t) JESSICA (c)     Climbing 3-5 steps with a railing? 3  -JESSICA (r) JUAN (t) JESSICA (c)     To walk in hospital room? 3  -JESSICA (r) JUAN (t) JESSICA (c)     AM-PAC 6 Clicks Score (PT) 19  -JESSICA (r) JUAN (t)     Highest level of mobility 6 --> Walked 10 steps or more  -JESSICA (r) JUAN (t)     Row Name 06/17/22 0830 06/17/22 0805       Functional  Assessment    Outcome Measure Options AM-PAC 6 Clicks Daily Activity (OT)  -LS AM-PAC 6 Clicks Basic Mobility (PT)  -JESSICA (r) JUAN (t) JESSICA (c)          User Key  (r) = Recorded By, (t) = Taken By, (c) = Cosigned By    Initials Name Provider Type    Bruce Meehan, PT DPT Physical Therapist    Kemal Pritchard, PT Student PT Student    Cary Gilbert, OT Occupational Therapist                             Physical Therapy Education                 Title: PT OT SLP Therapies (In Progress)     Topic: Physical Therapy (In Progress)     Point: Mobility training (Done)     Learning Progress Summary           Patient Acceptance, E, VU,DU by JUAN at 6/17/2022 0805    Comment: Pt presents to therapy s/p cranioctomy from meningioma. Pt was educated on benefits of ambulation to improve activity tolerance and strength. Anticipate d.c to home with assist when ready.                   Point: Home exercise program (Not Started)     Learner Progress:  Not documented in this visit.          Point: Body mechanics (Not Started)     Learner Progress:  Not documented in this visit.          Point: Precautions (Not Started)     Learner Progress:  Not documented in this visit.                      User Key     Initials Effective Dates Name Provider Type Discipline    JUAN 05/04/22 -  Kemal Lin, PT Student PT Student PT              PT Recommendation and Plan  Planned Therapy Interventions (PT): balance training, bed mobility training, gait training, motor coordination training, neuromuscular re-education, patient/family education, home exercise program, stair training, strengthening, transfer training  Plan of Care Reviewed With: patient  Progress: no change  Outcome Evaluation: PT eval complete. A&Ox4. Pt was ready to get up and move so he could also go to the bathroom. JUAN drain in place. Pt was in fowlers position upon arrival. Pt was SBAx1 when performing supine-sit transfer. Pt performed STS transfer with CGAx2. Pt voided before  ambulating 200 ft around unit before returning to supine position. Pt will continue to benefit from skilled PT interrvention in order to increase activity tolerance and endurance with ambulation/strength. Anticipate d.c to home with assist.     Time Calculation:    PT Charges     Row Name 06/17/22 0805             Time Calculation    Start Time 0805  -JESSICA (r) JUAN (t) JESSICA (c)      Stop Time 0854  + 5 minutes w chart review. PT with 54 total minutes  -JESSICA      Time Calculation (min) 49 min  -JESSICA (r) JUAN (t)      PT Received On 06/17/22  -JESSICA (r) JUAN (t) JESSICA (c)      PT Goal Re-Cert Due Date 06/27/22  -JESSICA (r) JUAN (t) JESSICA (c)            User Key  (r) = Recorded By, (t) = Taken By, (c) = Cosigned By    Initials Name Provider Type    Bruce Meehan, PT DPT Physical Therapist    Kemal Pritchard, PT Student PT Student                  PT G-Codes  Outcome Measure Options: AM-PAC 6 Clicks Daily Activity (OT)  AM-PAC 6 Clicks Score (PT): 19  AM-PAC 6 Clicks Score (OT): 21    Kemal Lin PT Student  6/17/2022

## 2022-06-18 LAB
ANION GAP SERPL CALCULATED.3IONS-SCNC: 9 MMOL/L (ref 5–15)
BASOPHILS # BLD AUTO: 0.01 10*3/MM3 (ref 0–0.2)
BASOPHILS NFR BLD AUTO: 0.1 % (ref 0–1.5)
BUN SERPL-MCNC: 22 MG/DL (ref 8–23)
BUN/CREAT SERPL: 23.9 (ref 7–25)
CALCIUM SPEC-SCNC: 8.6 MG/DL (ref 8.6–10.5)
CHLORIDE SERPL-SCNC: 104 MMOL/L (ref 98–107)
CO2 SERPL-SCNC: 24 MMOL/L (ref 22–29)
CREAT SERPL-MCNC: 0.92 MG/DL (ref 0.76–1.27)
DEPRECATED RDW RBC AUTO: 43.1 FL (ref 37–54)
EGFRCR SERPLBLD CKD-EPI 2021: 91.7 ML/MIN/1.73
EOSINOPHIL # BLD AUTO: 0 10*3/MM3 (ref 0–0.4)
EOSINOPHIL NFR BLD AUTO: 0 % (ref 0.3–6.2)
ERYTHROCYTE [DISTWIDTH] IN BLOOD BY AUTOMATED COUNT: 12.3 % (ref 12.3–15.4)
GLUCOSE BLDC GLUCOMTR-MCNC: 285 MG/DL (ref 70–130)
GLUCOSE BLDC GLUCOMTR-MCNC: 320 MG/DL (ref 70–130)
GLUCOSE BLDC GLUCOMTR-MCNC: 334 MG/DL (ref 70–130)
GLUCOSE BLDC GLUCOMTR-MCNC: 345 MG/DL (ref 70–130)
GLUCOSE SERPL-MCNC: 357 MG/DL (ref 65–99)
HCT VFR BLD AUTO: 33.9 % (ref 37.5–51)
HGB BLD-MCNC: 11.3 G/DL (ref 13–17.7)
IMM GRANULOCYTES # BLD AUTO: 0.16 10*3/MM3 (ref 0–0.05)
IMM GRANULOCYTES NFR BLD AUTO: 1 % (ref 0–0.5)
LYMPHOCYTES # BLD AUTO: 1.33 10*3/MM3 (ref 0.7–3.1)
LYMPHOCYTES NFR BLD AUTO: 8.1 % (ref 19.6–45.3)
MCH RBC QN AUTO: 31.7 PG (ref 26.6–33)
MCHC RBC AUTO-ENTMCNC: 33.3 G/DL (ref 31.5–35.7)
MCV RBC AUTO: 95.2 FL (ref 79–97)
MONOCYTES # BLD AUTO: 0.74 10*3/MM3 (ref 0.1–0.9)
MONOCYTES NFR BLD AUTO: 4.5 % (ref 5–12)
NEUTROPHILS NFR BLD AUTO: 14.11 10*3/MM3 (ref 1.7–7)
NEUTROPHILS NFR BLD AUTO: 86.3 % (ref 42.7–76)
NRBC BLD AUTO-RTO: 0 /100 WBC (ref 0–0.2)
PLATELET # BLD AUTO: 189 10*3/MM3 (ref 140–450)
PMV BLD AUTO: 11.1 FL (ref 6–12)
POTASSIUM SERPL-SCNC: 4 MMOL/L (ref 3.5–5.2)
QT INTERVAL: 388 MS
QTC INTERVAL: 474 MS
RBC # BLD AUTO: 3.56 10*6/MM3 (ref 4.14–5.8)
SODIUM SERPL-SCNC: 137 MMOL/L (ref 136–145)
WBC NRBC COR # BLD: 16.35 10*3/MM3 (ref 3.4–10.8)

## 2022-06-18 PROCEDURE — 85025 COMPLETE CBC W/AUTO DIFF WBC: CPT | Performed by: NURSE PRACTITIONER

## 2022-06-18 PROCEDURE — 82962 GLUCOSE BLOOD TEST: CPT

## 2022-06-18 PROCEDURE — 97116 GAIT TRAINING THERAPY: CPT

## 2022-06-18 PROCEDURE — 63710000001 INSULIN LISPRO (HUMAN) PER 5 UNITS: Performed by: INTERNAL MEDICINE

## 2022-06-18 PROCEDURE — 25010000002 SODIUM CHLORIDE 0.9 % WITH KCL 20 MEQ 20-0.9 MEQ/L-% SOLUTION: Performed by: INTERNAL MEDICINE

## 2022-06-18 PROCEDURE — 25010000002 HEPARIN (PORCINE) PER 1000 UNITS: Performed by: NURSE PRACTITIONER

## 2022-06-18 PROCEDURE — 25010000002 DEXAMETHASONE PER 1 MG: Performed by: NURSE PRACTITIONER

## 2022-06-18 PROCEDURE — 80048 BASIC METABOLIC PNL TOTAL CA: CPT | Performed by: NURSE PRACTITIONER

## 2022-06-18 PROCEDURE — 99024 POSTOP FOLLOW-UP VISIT: CPT | Performed by: NURSE PRACTITIONER

## 2022-06-18 PROCEDURE — 25010000002 HYDRALAZINE PER 20 MG: Performed by: NURSE PRACTITIONER

## 2022-06-18 PROCEDURE — 63710000001 INSULIN DETEMIR PER 5 UNITS: Performed by: INTERNAL MEDICINE

## 2022-06-18 RX ORDER — DEXTROSE MONOHYDRATE 25 G/50ML
25 INJECTION, SOLUTION INTRAVENOUS
Status: DISCONTINUED | OUTPATIENT
Start: 2022-06-18 | End: 2022-06-18 | Stop reason: SDUPTHER

## 2022-06-18 RX ORDER — DEXAMETHASONE 4 MG/1
4 TABLET ORAL EVERY 12 HOURS SCHEDULED
Status: DISCONTINUED | OUTPATIENT
Start: 2022-06-19 | End: 2022-06-20 | Stop reason: HOSPADM

## 2022-06-18 RX ORDER — NICOTINE POLACRILEX 4 MG
15 LOZENGE BUCCAL
Status: DISCONTINUED | OUTPATIENT
Start: 2022-06-18 | End: 2022-06-20 | Stop reason: HOSPADM

## 2022-06-18 RX ORDER — INSULIN LISPRO 100 [IU]/ML
0-24 INJECTION, SOLUTION INTRAVENOUS; SUBCUTANEOUS
Status: DISCONTINUED | OUTPATIENT
Start: 2022-06-19 | End: 2022-06-20 | Stop reason: HOSPADM

## 2022-06-18 RX ORDER — DEXAMETHASONE 2 MG/1
1 TABLET ORAL EVERY 12 HOURS SCHEDULED
Status: DISCONTINUED | OUTPATIENT
Start: 2022-06-23 | End: 2022-06-20 | Stop reason: HOSPADM

## 2022-06-18 RX ORDER — DEXAMETHASONE 2 MG/1
2 TABLET ORAL EVERY 12 HOURS SCHEDULED
Status: DISCONTINUED | OUTPATIENT
Start: 2022-06-21 | End: 2022-06-20 | Stop reason: HOSPADM

## 2022-06-18 RX ORDER — DEXAMETHASONE 0.5 MG/1
0.5 TABLET ORAL
Status: DISCONTINUED | OUTPATIENT
Start: 2022-06-25 | End: 2022-06-20 | Stop reason: HOSPADM

## 2022-06-18 RX ADMIN — INSULIN LISPRO 10 UNITS: 100 INJECTION, SOLUTION INTRAVENOUS; SUBCUTANEOUS at 11:07

## 2022-06-18 RX ADMIN — LEVETIRACETAM 500 MG: 500 TABLET, FILM COATED ORAL at 20:40

## 2022-06-18 RX ADMIN — CARVEDILOL 12.5 MG: 6.25 TABLET, FILM COATED ORAL at 08:15

## 2022-06-18 RX ADMIN — INSULIN DETEMIR 30 UNITS: 100 INJECTION, SOLUTION SUBCUTANEOUS at 20:34

## 2022-06-18 RX ADMIN — OXYCODONE AND ACETAMINOPHEN 1 TABLET: 325; 10 TABLET ORAL at 11:49

## 2022-06-18 RX ADMIN — AMLODIPINE BESYLATE 10 MG: 10 TABLET ORAL at 08:16

## 2022-06-18 RX ADMIN — GABAPENTIN 400 MG: 400 CAPSULE ORAL at 15:59

## 2022-06-18 RX ADMIN — OXYCODONE AND ACETAMINOPHEN 1 TABLET: 325; 10 TABLET ORAL at 21:58

## 2022-06-18 RX ADMIN — INSULIN DETEMIR 25 UNITS: 100 INJECTION, SOLUTION SUBCUTANEOUS at 08:16

## 2022-06-18 RX ADMIN — HEPARIN SODIUM 5000 UNITS: 5000 INJECTION INTRAVENOUS; SUBCUTANEOUS at 08:18

## 2022-06-18 RX ADMIN — INSULIN LISPRO 8 UNITS: 100 INJECTION, SOLUTION INTRAVENOUS; SUBCUTANEOUS at 16:57

## 2022-06-18 RX ADMIN — INSULIN LISPRO 10 UNITS: 100 INJECTION, SOLUTION INTRAVENOUS; SUBCUTANEOUS at 07:27

## 2022-06-18 RX ADMIN — OXYCODONE AND ACETAMINOPHEN 1 TABLET: 325; 10 TABLET ORAL at 05:58

## 2022-06-18 RX ADMIN — ATORVASTATIN CALCIUM 20 MG: 10 TABLET, FILM COATED ORAL at 20:40

## 2022-06-18 RX ADMIN — OXYCODONE AND ACETAMINOPHEN 1 TABLET: 325; 10 TABLET ORAL at 17:06

## 2022-06-18 RX ADMIN — DEXAMETHASONE SODIUM PHOSPHATE 4 MG: 4 INJECTION, SOLUTION INTRA-ARTICULAR; INTRALESIONAL; INTRAMUSCULAR; INTRAVENOUS; SOFT TISSUE at 10:06

## 2022-06-18 RX ADMIN — DEXAMETHASONE SODIUM PHOSPHATE 4 MG: 4 INJECTION, SOLUTION INTRA-ARTICULAR; INTRALESIONAL; INTRAMUSCULAR; INTRAVENOUS; SOFT TISSUE at 04:01

## 2022-06-18 RX ADMIN — LABETALOL HYDROCHLORIDE 10 MG: 5 INJECTION INTRAVENOUS at 17:41

## 2022-06-18 RX ADMIN — GABAPENTIN 400 MG: 400 CAPSULE ORAL at 20:40

## 2022-06-18 RX ADMIN — HYDRALAZINE HYDROCHLORIDE 10 MG: 20 INJECTION, SOLUTION INTRAMUSCULAR; INTRAVENOUS at 23:55

## 2022-06-18 RX ADMIN — HYDROCHLOROTHIAZIDE 25 MG: 25 TABLET ORAL at 08:15

## 2022-06-18 RX ADMIN — LANSOPRAZOLE 30 MG: KIT at 20:40

## 2022-06-18 RX ADMIN — LISINOPRIL 40 MG: 20 TABLET ORAL at 08:15

## 2022-06-18 RX ADMIN — DEXAMETHASONE SODIUM PHOSPHATE 4 MG: 4 INJECTION, SOLUTION INTRA-ARTICULAR; INTRALESIONAL; INTRAMUSCULAR; INTRAVENOUS; SOFT TISSUE at 15:59

## 2022-06-18 RX ADMIN — POTASSIUM CHLORIDE AND SODIUM CHLORIDE 50 ML/HR: 900; 150 INJECTION, SOLUTION INTRAVENOUS at 18:41

## 2022-06-18 RX ADMIN — GABAPENTIN 400 MG: 400 CAPSULE ORAL at 08:15

## 2022-06-18 RX ADMIN — CARVEDILOL 12.5 MG: 6.25 TABLET, FILM COATED ORAL at 17:04

## 2022-06-18 RX ADMIN — LEVETIRACETAM 500 MG: 500 TABLET, FILM COATED ORAL at 08:16

## 2022-06-18 RX ADMIN — HEPARIN SODIUM 5000 UNITS: 5000 INJECTION INTRAVENOUS; SUBCUTANEOUS at 20:39

## 2022-06-18 RX ADMIN — DEXAMETHASONE SODIUM PHOSPHATE 4 MG: 4 INJECTION, SOLUTION INTRA-ARTICULAR; INTRALESIONAL; INTRAMUSCULAR; INTRAVENOUS; SOFT TISSUE at 21:58

## 2022-06-18 NOTE — PROGRESS NOTES
[facility] Medicine Services  INPATIENT PROGRESS NOTE    Patient Name: Elijah Escobar  Date of Admission: 6/16/2022  Today's Date: 06/18/22  Length of Stay: 2  Primary Care Physician: Dylan Lama APRN    Subjective   Chief Complain: craniotomy  HPI     Patient seen and examined.  Patient accidentally peed in the floor last night due to having issues with cords and urinals.  There was no confusion or altered mentals status or delirium per nursing.  Patient embarrassed about the event but recalls the details, so not worried about any changes.  BP better but not completely at goal.          Review of Systems   Constitutional: Negative for chills, fatigue and fever.   Respiratory: Negative for cough and shortness of breath.    Cardiovascular: Negative for chest pain and palpitations.   Gastrointestinal: Negative for abdominal distention, abdominal pain, constipation, diarrhea, nausea and vomiting.        All pertinent negatives and positives are as above. All other systems have been reviewed and are negative unless otherwise stated.     Objective    Temp:  [98 °F (36.7 °C)-98.4 °F (36.9 °C)] 98.2 °F (36.8 °C)  Heart Rate:  [] 77  Resp:  [13-21] 16  BP: (122-179)/() 147/75  Physical Exam  Vitals and nursing note reviewed.   Constitutional:       Appearance: He is obese.   HENT:      Head: Normocephalic.      Comments: Head dressing still in place from surgery     Nose: No congestion or rhinorrhea.      Mouth/Throat:      Mouth: Mucous membranes are dry.      Pharynx: Oropharynx is clear.   Eyes:      General: No scleral icterus.     Conjunctiva/sclera: Conjunctivae normal.   Cardiovascular:      Rate and Rhythm: Normal rate and regular rhythm.      Heart sounds: Murmur heard.   Pulmonary:      Effort: Pulmonary effort is normal. No respiratory distress.      Breath sounds: Normal breath sounds. No stridor.   Abdominal:      General: Abdomen is flat. Bowel sounds are  normal.      Palpations: Abdomen is soft.   Musculoskeletal:      Right lower leg: Edema (trace) present.      Left lower leg: Edema (trace) present.   Skin:     General: Skin is warm and dry.      Coloration: Skin is not jaundiced or pale.   Neurological:      General: No focal deficit present.      Mental Status: He is alert and oriented to person, place, and time.   Psychiatric:         Mood and Affect: Mood normal.         Behavior: Behavior normal.       Results Review:  I have reviewed the labs, radiology results, and diagnostic studies.    Laboratory Data:   Results from last 7 days   Lab Units 06/18/22  0517 06/17/22  0249   WBC 10*3/mm3 16.35* 14.20*   HEMOGLOBIN g/dL 11.3* 12.6*   HEMATOCRIT % 33.9* 38.4   PLATELETS 10*3/mm3 189 212        Results from last 7 days   Lab Units 06/18/22  0517 06/17/22  0249   SODIUM mmol/L 137 137   POTASSIUM mmol/L 4.0 4.1   CHLORIDE mmol/L 104 103   CO2 mmol/L 24.0 22.0   BUN mg/dL 22 15   CREATININE mg/dL 0.92 0.98   CALCIUM mg/dL 8.6 8.6   GLUCOSE mg/dL 357* 290*       Culture Data:   No results found for: BLOODCX, URINECX, WOUNDCX, MRSACX, RESPCX, STOOLCX    Radiology Data:   Imaging Results (Last 24 Hours)     ** No results found for the last 24 hours. **          I have reviewed the patient's current medications.     Assessment/Plan     Active Hospital Problems    Diagnosis    • **Meningioma (HCC)    • Hypertension    • GERD (gastroesophageal reflux disease)    • Coronary artery disease    • Morbid obesity (HCC)    • Type 2 diabetes mellitus with hyperglycemia and peripheral neuropathy, with long-term current use of insulin (HCC)    • Leukocytosis    • Other specified anemias        Plan:  Consulted for hypertension and diabetes     For patients hypertension, target is SBP < 140.  Continue amlodipine.  D/C sotalol and switch to carvedilol.  When we did some chart review it seems as though patient has been on it for a long time and it was for some episodes of ?SVT.   Will try just utilizing carvedilol, if he has issues with tachyarrhythmia can always reinitiate the sotalol.  Continue lisinopril 40 mg.  HCTZ 25 mg.     For his diabetes, he has had some hyperglycemia, likely due to stress reaction and corticosteroids.  Patient takes long-acting BID athome.  Increase levemir 30units BID and increase the intensity of his sliding scale with qac/qhs accuchecks.  Will adjust as necessary based on how patient is eating.      PT/OT    VTE PPx with heparin         Discharge Planning: I expect the patient to be discharged per attending    Electronically signed by Joey Rubi MD, 06/18/22, 18:55 CDT.

## 2022-06-18 NOTE — PROGRESS NOTES
NEUROSURGERY DAILY PROGRESS NOTE    ASSESSMENT:   Elijah Escobar is a 66 y.o. with a significant comorbidity of coronary artery disease, diabetes, erectile dysfunction, GERD, hypertension, hyperlipidemia. He presents with a new problem of 2 episodes of intermittent amnesia suggestive of seizures. Physical exam findings of left pronator drift and dysmetria with left finger-nose otherwise neurologically intact.  His imaging shows avidly contrast-enhancing meningeal based mass with dural tails measuring 2.8 x 2.8 x 2.0 cm with surrounding FLAIR signal.    Past Medical History:   Diagnosis Date   • Brain tumor (HCC)    • Coronary artery disease    • COVID-19 vaccine series completed     MODERNA X 3; LAST DOSE 3/2022   • Diabetes (HCC)    • Erectile dysfunction    • GERD (gastroesophageal reflux disease)    • Hypercholesteremia    • Hypertension      Active Hospital Problems    Diagnosis    • **Meningioma (HCC)    • Hypertension    • GERD (gastroesophageal reflux disease)    • Coronary artery disease    • Morbid obesity (HCC)    • Type 2 diabetes mellitus with hyperglycemia and peripheral neuropathy, with long-term current use of insulin (HCC)    • Leukocytosis    • Other specified anemias      PLAN:   Neuro: Stable.  Bright and awake.  Oriented by 3.  Follows commands without prompting showing thumbs up and 2 fingers bilaterally.  No pronator drift or dysmetria.    2 Days Post-Op (6/16/2022) status post craniotomy for tumor   Pathology: Meningioma, meningothelial type (WHO grade I)   Postop CT head stable   JUAN = 110 mL, keep drain.   Unable to tolerate MRI.  We will cancel for now.   Continue Keppra   Decadron taper   Remain in ICU for continued neurologic monitoring   Neurochecks per policy.  Call for decline     CV: Continuous cardiac monitoring.  A-line DC'd.   Not requiring Cardene to keep SBP <140.  Hospitalist consulted.  Appreciate assist  Pulm: Maintaining O2 sat.  Continuous pulse oximetry.  Incentive  "spirometry.   : Voiding spontaneously.  Bladder scans and I/O cath per policy.   FEN: Tolerating regular diet.     GI: No acute issues.    ID: 23-hour postoperative prophylactic antibiotics complete    Heme:  DVT prophylaxis; SCD's  Pain: Tolerable at present with oral meds.     Dispo: PT/OT.       CHIEF COMPLAINT:   Episodic seizures    Subjective  Symptom stable.  Doing well    Temp:  [98 °F (36.7 °C)-98.4 °F (36.9 °C)] 98 °F (36.7 °C)  Heart Rate:  [58-93] 88  Resp:  [13-21] 18  BP: (122-158)/(54-97) 158/83  Arterial Line BP: (109-146)/(50-61) 142/60    Objective:  General Appearance:  In no acute distress.    Vital signs: (most recent): Blood pressure 158/83, pulse 88, temperature 98 °F (36.7 °C), temperature source Axillary, resp. rate 18, height 178 cm (70.08\"), weight 128 kg (282 lb 6.6 oz), SpO2 97 %.      Neurologic Exam     Mental Status   Oriented to person.   Disoriented to place.   Oriented to year.   Attention: normal. Concentration: normal.   Speech: speech is normal   Level of consciousness: alert    Bright and awake.  Oriented by 3.  Follows commands without prompting showing thumbs up and 2 fingers bilaterally.  No pronator drift or dysmetria.      Cranial Nerves     CN II   Visual fields full to confrontation.     CN III, IV, VI   Pupils are equal, round, and reactive to light.  Extraocular motions are normal.     CN V   Facial sensation intact.     CN VII   Facial expression full, symmetric.     CN VIII   CN VIII normal.     CN IX, X   CN IX normal.     CN XI   CN XI normal.     Motor Exam   Right arm tone: normal  Left arm tone: normal  Right leg tone: normal  Left leg tone: normal    Strength   Right deltoid: 5/5  Left deltoid: 5/5  Right biceps: 5/5  Left biceps: 5/5  Right triceps: 5/5  Left triceps: 5/5  Right wrist extension: 5/5  Left wrist extension: 5/5  Right iliopsoas: 5/5  Left iliopsoas: 5/5  Right quadriceps: 5/5  Left quadriceps: 5/5  Right anterior tibial: 5/5  Left anterior " tibial: 5/5  Right gastroc: 5/5  Left gastroc: 5/5  Right EHL 5/5  Left EHL 5/5       Sensory Exam   Right arm light touch: normal  Left arm light touch: normal  Right leg light touch: normal  Left leg light touch: normal    Gait, Coordination, and Reflexes     Tremor   Resting tremor: absent  Intention tremor: absent  Action tremor: absent    Drains:   Closed/Suction Drain 1 Scalp Bulb (Active)   Site Description Unable to view 06/16/22 1240   Dressing Status Clean;Dry;Intact 06/16/22 1240   Drainage Appearance Bloody 06/16/22 1240   Status To bulb suction 06/16/22 1240       Urethral Catheter Non-latex;Silicone 16 Fr. (Active)   Daily Indications Selected surgeries ( tract, abdomen) 06/16/22 1240   Site Assessment Clean;Skin intact 06/16/22 1233   Collection Container Standard drainage bag 06/16/22 1240   Securement Method Securing device 06/16/22 1240   Output (mL) 1100 mL 06/16/22 1233       Output by Drain (mL) 06/17/22 0701 - 06/17/22 1900 06/17/22 1901 - 06/18/22 0700 06/18/22 0701 - 06/18/22 0936 Range Total   Closed/Suction Drain 1 Scalp Bulb 85 10 10 105       Imaging Results (Last 24 Hours)     Procedure Component Value Units Date/Time    MRI Brain With & Without Contrast [[id number]] Resulted: 06/17/22 1323     Updated: 06/17/22 1458        Lab Results (last 24 hours)     Procedure Component Value Units Date/Time    POC Glucose Once [[id number]]  (Abnormal) Collected: 06/18/22 0724    Specimen: Blood Updated: 06/18/22 0735     Glucose 320 mg/dL      Comment: : [id number] Antolin BrittanyMeter ID: [id number]       Basic Metabolic Panel [[id number]]  (Abnormal) Collected: 06/18/22 0517    Specimen: Blood Updated: 06/18/22 0626     Glucose 357 mg/dL      BUN 22 mg/dL      Creatinine 0.92 mg/dL      Sodium 137 mmol/L      Potassium 4.0 mmol/L      Chloride 104 mmol/L      CO2 24.0 mmol/L      Calcium 8.6 mg/dL      BUN/Creatinine Ratio 23.9     Anion Gap 9.0 mmol/L      eGFR 91.7 mL/min/1.73       Comment: National Kidney Foundation and American Society of Nephrology (ASN) Task Force recommended calculation based on the Chronic Kidney Disease Epidemiology Collaboration (CKD-EPI) equation refit without adjustment for race.       Narrative:      GFR Normal >60  Chronic Kidney Disease <60  Kidney Failure <15      CBC & Differential [[id number]]  (Abnormal) Collected: 06/18/22 0517    Specimen: Blood Updated: 06/18/22 0609    Narrative:      The following orders were created for panel order CBC & Differential.  Procedure                               Abnormality         Status                     ---------                               -----------         ------                     CBC Auto Differential[[id number]]        Abnormal            Final result                 Please view results for these tests on the individual orders.    CBC Auto Differential [[id number]]  (Abnormal) Collected: 06/18/22 0517    Specimen: Blood Updated: 06/18/22 0609     WBC 16.35 10*3/mm3      RBC 3.56 10*6/mm3      Hemoglobin 11.3 g/dL      Hematocrit 33.9 %      MCV 95.2 fL      MCH 31.7 pg      MCHC 33.3 g/dL      RDW 12.3 %      RDW-SD 43.1 fl      MPV 11.1 fL      Platelets 189 10*3/mm3      Neutrophil % 86.3 %      Lymphocyte % 8.1 %      Monocyte % 4.5 %      Eosinophil % 0.0 %      Basophil % 0.1 %      Immature Grans % 1.0 %      Neutrophils, Absolute 14.11 10*3/mm3      Lymphocytes, Absolute 1.33 10*3/mm3      Monocytes, Absolute 0.74 10*3/mm3      Eosinophils, Absolute 0.00 10*3/mm3      Basophils, Absolute 0.01 10*3/mm3      Immature Grans, Absolute 0.16 10*3/mm3      nRBC 0.0 /100 WBC     POC Glucose Once [[id number]]  (Abnormal) Collected: 06/1955    Specimen: Blood Updated: 06/17/22 2005     Glucose 287 mg/dL      Comment: : [id number] Cha KathyMeter ID: [id number]       POC Glucose Once [[id number]]  (Abnormal) Collected: 06/17/22 1726    Specimen: Blood Updated: 06/17/22 1738     Glucose 308 mg/dL       Comment: : [id number] English Papito ID: [id number]       Tissue Pathology Exam [[id number]] Collected: 06/16/22 1102    Specimen: Tissue from Brain Updated: 06/17/22 1536     Note to Patients --     This report may contain a detailed description of human tissue sent by a health care provider to the laboratory for pathologic evaluation. The content of this report is essential for diagnosis and may provide important critical findings. This information may be unfamiliar to patients to review without a medical professional present. It is advised that the patient review this report in the presence of a health care provider who can answer questions and explain the results.       Case Report --     Surgical Pathology Report                         Case: [id number]                                  Authorizing Provider:  Flaco Portillo MD Collected:           06/16/2022 11:02 AM          Ordering Location:     [facility] OR  Received:            06/16/2022 03:24 PM          Pathologist:           Keny Butler MD                                                        Specimen:    Brain, BRAIN TUMOR FOR PERMANENT                                                            Final Diagnosis --     Brain tumor, craniotomy:  Meningioma, meningothelial type (WHO grade I)       Synoptic Checklist --     CNS: Histological Assessment  CNS: HISTOLOGICAL ASSESSMENT - All Specimens  Protocol posted: 2/26/2020    CLINICAL     History of Prior Therapy for this Neoplasm:    Not known      History of Previous Tumor and / or Familial Syndrome:    Not known      Neuroimaging Findings:    Not available     SPECIMEN     Procedure:    Resection      Specimen Size, Gross Description:    Greatest Dimension (Centimeters): 3.0 cm    TUMOR     Tumor Site:    Dura      Tumor Laterality:    Not specified      Tumor Focality:    Unifocal      Histologic Type:           :    Meningothelial meningioma      Histologic Grade:   "  WHO Grade I      Treatment Effect (Histological Evidence of Prior Therapy):    Not identified     SPECIAL STUDIES     Biomarker Studies:    Not performed      Designate block for future studies:    1A        Gross Description --     1. Brain.   Received in a formalin filled container labeled with the patient's name, date of birth, and \"brain tumor for permanent\".  The specimen consists of a flap of dura with intact yellow-pink, slightly lobulated mass.  The dura measures 3.8 x 2.2 cm and measures less than 0.1 cm in thickness.  The intact, attached mass measures 3.0 x 2.5 x 2.3 cm.  1 side of the dura is inked blue.  The cut surface of the mass shows a yellow to gray, solid and spongy surface with no focal areas of discoloration identified.  Representative sections of the mass and attached dura are wrapped in lens paper and submitted in blocks 1A through 1C.           Microscopic Description --     Histologic sections show a well-circumscribed brain lesion consisting of spindled to oval cells arranged in fascicles and whirls.  No significant cytologic atypia or increased mitotic activity is present.      POC Glucose Once [[id number]]  (Abnormal) Collected: 06/17/22 1143    Specimen: Blood Updated: 06/17/22 1154     Glucose 325 mg/dL      Comment: : [id number] English SethMeter ID: [id number]           [id number]  ADITYA Cespedes    "

## 2022-06-18 NOTE — THERAPY TREATMENT NOTE
Acute Care - Physical Therapy Treatment Note  [facility]     Patient Name: Elijah Escobar  : 1955  MRN: [id number]  Today's Date: 2022      Visit Dx:     ICD-10-CM ICD-9-CM   1. Meningioma (HCC)  D32.9 225.2   2. Impaired mobility and ADLs  Z74.09 V49.89    Z78.9    3. Impaired mobility  Z74.09 799.89     Patient Active Problem List   Diagnosis   • Meningioma (HCC)   • Hypertension   • GERD (gastroesophageal reflux disease)   • Coronary artery disease   • Morbid obesity (HCC)   • Type 2 diabetes mellitus with hyperglycemia and peripheral neuropathy, with long-term current use of insulin (HCC)   • Leukocytosis   • Other specified anemias     Past Medical History:   Diagnosis Date   • Brain tumor (HCC)    • Coronary artery disease    • COVID-19 vaccine series completed     MODERNA X 3; LAST DOSE 3/2022   • Diabetes (HCC)    • Erectile dysfunction    • GERD (gastroesophageal reflux disease)    • Hypercholesteremia    • Hypertension      Past Surgical History:   Procedure Laterality Date   • BALLOON ANGIOPLASTY, ARTERY Right    • COLONOSCOPY       PT Assessment (last 12 hours)     PT Evaluation and Treatment     Row Name 22 0820          Physical Therapy Time and Intention    Subjective Information no complaints  -     Document Type therapy note (daily note)  -     Mode of Treatment physical therapy  -     Row Name 22 0820          General Information    Existing Precautions/Restrictions fall  JUAN drain  -     Row Name 22 0820          Pain    Pretreatment Pain Rating 0/10 - no pain  -CHRISTIANO     Posttreatment Pain Rating 0/10 - no pain  -     Row Name 22 0820          Bed Mobility    Supine-Sit [place] (Bed Mobility) supervision  -     Sit-Supine [place] (Bed Mobility) --  chair  -CHRISTIANO     Row Name 22 0820          Transfers    Sit-Stand [place] (Transfers) standby assist  -CHRISTIANO     Stand-Sit [place] (Transfers) standby assist  -CHRISTIANO     Row Name 22  0820          Gait/Stairs (Locomotion)    [place] Level (Gait) contact guard  -CHRISTIANO     Distance in Feet (Gait) 200  -CHRISTIANO     Row Name             Wound 06/16/22 0951 Right anterior scalp Incision    Wound - Properties Group Placement Date: 06/16/22  -JBA Placement Time: 0951 -JBA Side: Right  -JBA Orientation: anterior  -JBA Location: scalp  -JBA Primary Wound Type: Incision  -JBA     Retired Wound - Properties Group Placement Date: 06/16/22  -JBA Placement Time: 0951 -JBA Side: Right  -JBA Orientation: anterior  -JBA Location: scalp  -JBA Primary Wound Type: Incision  -JBA     Retired Wound - Properties Group Date first assessed: 06/16/22  -JBA Time first assessed: 0951 -JBA Side: Right  -JBA Location: scalp  -JBA Primary Wound Type: Incision  -JBA     Row Name 06/18/22 0820          Positioning and Restraints    Pre-Treatment Position in bed  -CHRISTIANO     Post Treatment Position chair  -CHRISTIANO     In Chair reclined;call light within reach;encouraged to call for assist  -CHRISTIANO           User Key  (r) = Recorded By, (t) = Taken By, (c) = Cosigned By    Initials Name Provider Type    Александр Morley, PTA Physical Therapist Assistant    Ricky Watts, RN Registered Nurse                Physical Therapy Education                 Title: PT OT SLP Therapies (In Progress)     Topic: Physical Therapy (In Progress)     Point: Mobility training (Done)     Learning Progress Summary           Patient Acceptance, E, VU by CHRISTIANO at 6/18/2022 0820    Comment: benefits of amb, safety    Acceptance, E, VU,DU by JUAN at 6/17/2022 0805    Comment: Pt presents to therapy s/p cranioctomy from meningioma. Pt was educated on benefits of ambulation to improve activity tolerance and strength. Anticipate d.c to home with assist when ready.                   Point: Home exercise program (Not Started)     Learner Progress:  Not documented in this visit.          Point: Body mechanics (Not Started)     Learner Progress:  Not documented in this  visit.          Point: Precautions (Not Started)     Learner Progress:  Not documented in this visit.                      User Key     Initials Effective Dates Name Provider Type Discipline    CHRISTIANO 12/08/16 -  Александр Saravia PTA Physical Therapist Assistant PT    JUAN 05/04/22 -  Kemal Lin, PT Student PT Student PT              PT Recommendation and Plan     Plan of Care Reviewed With: patient  Progress: improving  Outcome Evaluation: Pt was able to perform bed mobility with supervision.  Transfered sit to stand with SBA.  Amb 200' with CGA, no LOB.   Outcome Measures     Row Name 06/18/22 0820             How much help from another person do you currently need...    Turning from your back to your side while in flat bed without using bedrails? 4  -CHRISTIANO      Moving from lying on back to sitting on the side of a flat bed without bedrails? 4  -CHRISTIANO      Moving to and from a bed to a chair (including a wheelchair)? 3  -CHRISTIANO      Standing up from a chair using your arms (e.g., wheelchair, bedside chair)? 3  -CHRISTIANO      Climbing 3-5 steps with a railing? 3  -CHRISTIANO      To walk in hospital room? 3  -CHRISTIANO      AM-PAC 6 Clicks Score (PT) 20  -CHRISTIANO              Functional Assessment    Outcome Measure Options AM-PAC 6 Clicks Basic Mobility (PT)  -CHRISTIANO            User Key  (r) = Recorded By, (t) = Taken By, (c) = Cosigned By    Initials Name Provider Type    CHRISTIANO Александр Saravia PTA Physical Therapist Assistant                 Time Calculation:    PT Charges     Row Name 06/18/22 0820             Time Calculation    Start Time 0820  -CHRISTIANO      Stop Time 0836  -CHRISTIANO      Time Calculation (min) 16 min  -CHRISTIANO      PT Received On 06/18/22  -CHRISTIANO              Time Calculation- PT    Total Timed Code Minutes- PT 16 minute(s)  -CHRISTIANO              Timed Charges    54073 - Gait Training Minutes  16  -CHRISTIANO              Total Minutes    Timed Charges Total Minutes 16  -CHRISTIANO       Total Minutes 16  -CHRISTIANO            User Key  (r) = Recorded By, (t) = Taken By, (c) =  Cosigned By    Initials Name Provider Type    CHRISTIANO Александр Saravia PTA Physical Therapist Assistant              Therapy Charges for Today     Code Description Service Date Service Provider Modifiers Qty    [id number] HC GAIT TRAINING EA 15 MIN 6/18/2022 Александр Saravia PTA GP 1          PT G-Codes  Outcome Measure Options: AM-PAC 6 Clicks Basic Mobility (PT)  AM-PAC 6 Clicks Score (PT): 20  AM-PAC 6 Clicks Score (OT): 21    Александр Saravia PTA  6/18/2022

## 2022-06-19 ENCOUNTER — APPOINTMENT (OUTPATIENT)
Dept: CARDIOLOGY | Facility: HOSPITAL | Age: 67
End: 2022-06-19

## 2022-06-19 PROBLEM — I48.0 PAROXYSMAL ATRIAL FIBRILLATION WITH RAPID VENTRICULAR RESPONSE: Status: ACTIVE | Noted: 2022-01-01

## 2022-06-19 LAB
ANION GAP SERPL CALCULATED.3IONS-SCNC: 10 MMOL/L (ref 5–15)
BASOPHILS # BLD AUTO: 0.01 10*3/MM3 (ref 0–0.2)
BASOPHILS NFR BLD AUTO: 0.1 % (ref 0–1.5)
BUN SERPL-MCNC: 22 MG/DL (ref 8–23)
BUN/CREAT SERPL: 24.2 (ref 7–25)
CALCIUM SPEC-SCNC: 8.4 MG/DL (ref 8.6–10.5)
CHLORIDE SERPL-SCNC: 102 MMOL/L (ref 98–107)
CO2 SERPL-SCNC: 23 MMOL/L (ref 22–29)
CREAT SERPL-MCNC: 0.91 MG/DL (ref 0.76–1.27)
DEPRECATED RDW RBC AUTO: 42.3 FL (ref 37–54)
EGFRCR SERPLBLD CKD-EPI 2021: 93 ML/MIN/1.73
EOSINOPHIL # BLD AUTO: 0 10*3/MM3 (ref 0–0.4)
EOSINOPHIL NFR BLD AUTO: 0 % (ref 0.3–6.2)
ERYTHROCYTE [DISTWIDTH] IN BLOOD BY AUTOMATED COUNT: 12.1 % (ref 12.3–15.4)
GLUCOSE BLDC GLUCOMTR-MCNC: 245 MG/DL (ref 70–130)
GLUCOSE BLDC GLUCOMTR-MCNC: 288 MG/DL (ref 70–130)
GLUCOSE BLDC GLUCOMTR-MCNC: 305 MG/DL (ref 70–130)
GLUCOSE BLDC GLUCOMTR-MCNC: 312 MG/DL (ref 70–130)
GLUCOSE BLDC GLUCOMTR-MCNC: 337 MG/DL (ref 70–130)
GLUCOSE SERPL-MCNC: 351 MG/DL (ref 65–99)
HCT VFR BLD AUTO: 34.3 % (ref 37.5–51)
HGB BLD-MCNC: 11.5 G/DL (ref 13–17.7)
IMM GRANULOCYTES # BLD AUTO: 0.14 10*3/MM3 (ref 0–0.05)
IMM GRANULOCYTES NFR BLD AUTO: 1 % (ref 0–0.5)
LYMPHOCYTES # BLD AUTO: 1.04 10*3/MM3 (ref 0.7–3.1)
LYMPHOCYTES NFR BLD AUTO: 7.6 % (ref 19.6–45.3)
MAGNESIUM SERPL-MCNC: 2.4 MG/DL (ref 1.6–2.4)
MCH RBC QN AUTO: 32 PG (ref 26.6–33)
MCHC RBC AUTO-ENTMCNC: 33.5 G/DL (ref 31.5–35.7)
MCV RBC AUTO: 95.5 FL (ref 79–97)
MONOCYTES # BLD AUTO: 0.43 10*3/MM3 (ref 0.1–0.9)
MONOCYTES NFR BLD AUTO: 3.1 % (ref 5–12)
NEUTROPHILS NFR BLD AUTO: 12.05 10*3/MM3 (ref 1.7–7)
NEUTROPHILS NFR BLD AUTO: 88.2 % (ref 42.7–76)
NRBC BLD AUTO-RTO: 0 /100 WBC (ref 0–0.2)
PLATELET # BLD AUTO: 191 10*3/MM3 (ref 140–450)
PMV BLD AUTO: 11.4 FL (ref 6–12)
POTASSIUM SERPL-SCNC: 4 MMOL/L (ref 3.5–5.2)
QT INTERVAL: 334 MS
QTC INTERVAL: 513 MS
RBC # BLD AUTO: 3.59 10*6/MM3 (ref 4.14–5.8)
SODIUM SERPL-SCNC: 135 MMOL/L (ref 136–145)
TSH SERPL DL<=0.05 MIU/L-ACNC: 0.35 UIU/ML (ref 0.27–4.2)
WBC NRBC COR # BLD: 13.67 10*3/MM3 (ref 3.4–10.8)

## 2022-06-19 PROCEDURE — 93010 ELECTROCARDIOGRAM REPORT: CPT | Performed by: EMERGENCY MEDICINE

## 2022-06-19 PROCEDURE — 63710000001 INSULIN LISPRO (HUMAN) PER 5 UNITS: Performed by: INTERNAL MEDICINE

## 2022-06-19 PROCEDURE — 63710000001 INSULIN DETEMIR PER 5 UNITS: Performed by: INTERNAL MEDICINE

## 2022-06-19 PROCEDURE — 93306 TTE W/DOPPLER COMPLETE: CPT | Performed by: INTERNAL MEDICINE

## 2022-06-19 PROCEDURE — 25010000002 SODIUM CHLORIDE 0.9 % WITH KCL 20 MEQ 20-0.9 MEQ/L-% SOLUTION: Performed by: INTERNAL MEDICINE

## 2022-06-19 PROCEDURE — 25010000002 HEPARIN (PORCINE) PER 1000 UNITS: Performed by: NURSE PRACTITIONER

## 2022-06-19 PROCEDURE — 93005 ELECTROCARDIOGRAM TRACING: CPT | Performed by: NEUROLOGICAL SURGERY

## 2022-06-19 PROCEDURE — 63710000001 DEXAMETHASONE PER 0.25 MG: Performed by: NURSE PRACTITIONER

## 2022-06-19 PROCEDURE — 85025 COMPLETE CBC W/AUTO DIFF WBC: CPT | Performed by: NURSE PRACTITIONER

## 2022-06-19 PROCEDURE — 93005 ELECTROCARDIOGRAM TRACING: CPT | Performed by: INTERNAL MEDICINE

## 2022-06-19 PROCEDURE — 99024 POSTOP FOLLOW-UP VISIT: CPT | Performed by: NURSE PRACTITIONER

## 2022-06-19 PROCEDURE — 83735 ASSAY OF MAGNESIUM: CPT | Performed by: INTERNAL MEDICINE

## 2022-06-19 PROCEDURE — 80048 BASIC METABOLIC PNL TOTAL CA: CPT | Performed by: NURSE PRACTITIONER

## 2022-06-19 PROCEDURE — 25010000002 HYDRALAZINE PER 20 MG: Performed by: NURSE PRACTITIONER

## 2022-06-19 PROCEDURE — 82962 GLUCOSE BLOOD TEST: CPT

## 2022-06-19 PROCEDURE — 25010000002 PERFLUTREN 6.52 MG/ML SUSPENSION: Performed by: NEUROLOGICAL SURGERY

## 2022-06-19 PROCEDURE — 97116 GAIT TRAINING THERAPY: CPT

## 2022-06-19 PROCEDURE — 84443 ASSAY THYROID STIM HORMONE: CPT | Performed by: INTERNAL MEDICINE

## 2022-06-19 PROCEDURE — 93306 TTE W/DOPPLER COMPLETE: CPT

## 2022-06-19 RX ORDER — DILTIAZEM HCL IN NACL,ISO-OSM 125 MG/125
5-15 PLASTIC BAG, INJECTION (ML) INTRAVENOUS
Status: DISCONTINUED | OUTPATIENT
Start: 2022-06-19 | End: 2022-06-20 | Stop reason: HOSPADM

## 2022-06-19 RX ORDER — SOTALOL HYDROCHLORIDE 80 MG/1
80 TABLET ORAL EVERY 12 HOURS SCHEDULED
Status: DISCONTINUED | OUTPATIENT
Start: 2022-06-19 | End: 2022-06-20

## 2022-06-19 RX ADMIN — SOTALOL HYDROCHLORIDE 80 MG: 80 TABLET ORAL at 21:17

## 2022-06-19 RX ADMIN — INSULIN LISPRO 8 UNITS: 100 INJECTION, SOLUTION INTRAVENOUS; SUBCUTANEOUS at 17:42

## 2022-06-19 RX ADMIN — HYDRALAZINE HYDROCHLORIDE 10 MG: 20 INJECTION, SOLUTION INTRAMUSCULAR; INTRAVENOUS at 07:05

## 2022-06-19 RX ADMIN — INSULIN DETEMIR 55 UNITS: 100 INJECTION, SOLUTION SUBCUTANEOUS at 21:18

## 2022-06-19 RX ADMIN — CARVEDILOL 12.5 MG: 6.25 TABLET, FILM COATED ORAL at 07:16

## 2022-06-19 RX ADMIN — DEXAMETHASONE 4 MG: 4 TABLET ORAL at 21:17

## 2022-06-19 RX ADMIN — INSULIN DETEMIR 55 UNITS: 100 INJECTION, SOLUTION SUBCUTANEOUS at 09:38

## 2022-06-19 RX ADMIN — POTASSIUM CHLORIDE AND SODIUM CHLORIDE 50 ML/HR: 900; 150 INJECTION, SOLUTION INTRAVENOUS at 18:24

## 2022-06-19 RX ADMIN — LANSOPRAZOLE 30 MG: KIT at 21:18

## 2022-06-19 RX ADMIN — DEXAMETHASONE 4 MG: 4 TABLET ORAL at 08:37

## 2022-06-19 RX ADMIN — OXYCODONE AND ACETAMINOPHEN 1 TABLET: 325; 10 TABLET ORAL at 03:04

## 2022-06-19 RX ADMIN — LABETALOL HYDROCHLORIDE 10 MG: 5 INJECTION INTRAVENOUS at 08:14

## 2022-06-19 RX ADMIN — GABAPENTIN 400 MG: 400 CAPSULE ORAL at 16:13

## 2022-06-19 RX ADMIN — HEPARIN SODIUM 5000 UNITS: 5000 INJECTION INTRAVENOUS; SUBCUTANEOUS at 08:24

## 2022-06-19 RX ADMIN — ATORVASTATIN CALCIUM 20 MG: 10 TABLET, FILM COATED ORAL at 21:17

## 2022-06-19 RX ADMIN — LEVETIRACETAM 500 MG: 500 TABLET, FILM COATED ORAL at 21:17

## 2022-06-19 RX ADMIN — LEVETIRACETAM 500 MG: 500 TABLET, FILM COATED ORAL at 08:24

## 2022-06-19 RX ADMIN — GABAPENTIN 400 MG: 400 CAPSULE ORAL at 21:17

## 2022-06-19 RX ADMIN — SOTALOL HYDROCHLORIDE 80 MG: 80 TABLET ORAL at 09:49

## 2022-06-19 RX ADMIN — BUTALBITAL, ACETAMINOPHEN AND CAFFEINE 1 TABLET: 50; 325; 40 TABLET ORAL at 16:13

## 2022-06-19 RX ADMIN — AMLODIPINE BESYLATE 10 MG: 10 TABLET ORAL at 08:24

## 2022-06-19 RX ADMIN — INSULIN LISPRO 16 UNITS: 100 INJECTION, SOLUTION INTRAVENOUS; SUBCUTANEOUS at 07:17

## 2022-06-19 RX ADMIN — OXYCODONE AND ACETAMINOPHEN 1 TABLET: 325; 10 TABLET ORAL at 18:30

## 2022-06-19 RX ADMIN — LISINOPRIL 40 MG: 20 TABLET ORAL at 08:24

## 2022-06-19 RX ADMIN — HYDROCHLOROTHIAZIDE 25 MG: 25 TABLET ORAL at 08:24

## 2022-06-19 RX ADMIN — INSULIN LISPRO 16 UNITS: 100 INJECTION, SOLUTION INTRAVENOUS; SUBCUTANEOUS at 12:05

## 2022-06-19 RX ADMIN — Medication 5 MG/HR: at 09:07

## 2022-06-19 RX ADMIN — PERFLUTREN 9.78 MG: 6.52 INJECTION, SUSPENSION INTRAVENOUS at 10:40

## 2022-06-19 RX ADMIN — OXYCODONE AND ACETAMINOPHEN 1 TABLET: 325; 10 TABLET ORAL at 22:33

## 2022-06-19 RX ADMIN — GABAPENTIN 400 MG: 400 CAPSULE ORAL at 08:25

## 2022-06-19 RX ADMIN — HEPARIN SODIUM 5000 UNITS: 5000 INJECTION INTRAVENOUS; SUBCUTANEOUS at 21:18

## 2022-06-19 NOTE — PROGRESS NOTES
NEUROSURGERY DAILY PROGRESS NOTE    ASSESSMENT:   Elijah Escobar is a 66 y.o. with a significant comorbidity of coronary artery disease, diabetes, erectile dysfunction, GERD, hypertension, hyperlipidemia. He presents with a new problem of 2 episodes of intermittent amnesia suggestive of seizures. Physical exam findings of left pronator drift and dysmetria with left finger-nose otherwise neurologically intact.  His imaging shows avidly contrast-enhancing meningeal based mass with dural tails measuring 2.8 x 2.8 x 2.0 cm with surrounding FLAIR signal.    Past Medical History:   Diagnosis Date   • Brain tumor (HCC)    • Coronary artery disease    • COVID-19 vaccine series completed     MODERNA X 3; LAST DOSE 3/2022   • Diabetes (HCC)    • Erectile dysfunction    • GERD (gastroesophageal reflux disease)    • Hypercholesteremia    • Hypertension      Active Hospital Problems    Diagnosis    • **Meningioma (HCC)    • Paroxysmal atrial fibrillation with rapid ventricular response (HCC)    • Hypertension    • GERD (gastroesophageal reflux disease)    • Coronary artery disease    • Morbid obesity (HCC)    • Type 2 diabetes mellitus with hyperglycemia and peripheral neuropathy, with long-term current use of insulin (HCC)    • Leukocytosis    • Other specified anemias      PLAN:   Neuro: Stable.  Bright and awake.  Oriented by 3.  Follows commands without prompting showing thumbs up and 2 fingers bilaterally.  No pronator drift or dysmetria.    3 Days Post-Op (6/16/2022) status post craniotomy for tumor   Pathology: Meningioma, meningothelial type (WHO grade I)   Postop CT head stable   JUAN = 23 mL, DC today   Unable to tolerate MRI.  We will cancel for now and attempt OP   Continue Keppra   Decadron taper   Neurochecks per policy.  Call for decline   DC to floor     CV: Continuous cardiac monitoring.  A-line DC'd.   Not requiring Cardene to keep SBP <140.  Hospitalist consulted.  Appreciate assist   AEdel patiño with RVR.  On  "Cardizem.    Pulm: Maintaining O2 sat.  Continuous pulse oximetry.  Incentive spirometry.   : Voiding spontaneously.  Bladder scans and I/O cath per policy.   FEN: Tolerating regular diet.     GI: No acute issues.    ID: 23-hour postoperative prophylactic antibiotics complete    Heme:  DVT prophylaxis; SCD's  Pain: Tolerable at present with oral meds.     Dispo: PT/OT.       CHIEF COMPLAINT:   Episodic seizures    Subjective  Symptom stable.  Doing well    Temp:  [97.9 °F (36.6 °C)-98.3 °F (36.8 °C)] 97.9 °F (36.6 °C)  Heart Rate:  [] 116  Resp:  [11-17] 14  BP: (110-194)/() 156/112    Objective:  General Appearance:  In no acute distress.    Vital signs: (most recent): Blood pressure (!) 156/112, pulse 116, temperature 97.9 °F (36.6 °C), temperature source Axillary, resp. rate 14, height 178 cm (70.08\"), weight 126 kg (276 lb 14.4 oz), SpO2 98 %.      Neurologic Exam     Mental Status   Oriented to person.   Disoriented to place.   Oriented to year.   Attention: normal. Concentration: normal.   Speech: speech is normal   Level of consciousness: alert    Bright and awake.  Oriented by 3.  Follows commands without prompting showing thumbs up and 2 fingers bilaterally.  No pronator drift or dysmetria.      Cranial Nerves     CN II   Visual fields full to confrontation.     CN III, IV, VI   Pupils are equal, round, and reactive to light.  Extraocular motions are normal.     CN V   Facial sensation intact.     CN VII   Facial expression full, symmetric.     CN VIII   CN VIII normal.     CN IX, X   CN IX normal.     CN XI   CN XI normal.     Motor Exam   Right arm tone: normal  Left arm tone: normal  Right leg tone: normal  Left leg tone: normal    Strength   Right deltoid: 5/5  Left deltoid: 5/5  Right biceps: 5/5  Left biceps: 5/5  Right triceps: 5/5  Left triceps: 5/5  Right wrist extension: 5/5  Left wrist extension: 5/5  Right iliopsoas: 5/5  Left iliopsoas: 5/5  Right quadriceps: 5/5  Left quadriceps: " 5/5  Right anterior tibial: 5/5  Left anterior tibial: 5/5  Right gastroc: 5/5  Left gastroc: 5/5  Right EHL 5/5  Left EHL 5/5       Sensory Exam   Right arm light touch: normal  Left arm light touch: normal  Right leg light touch: normal  Left leg light touch: normal    Gait, Coordination, and Reflexes     Tremor   Resting tremor: absent  Intention tremor: absent  Action tremor: absent    Drains:   Closed/Suction Drain 1 Scalp Bulb (Active)   Site Description Unable to view 06/16/22 1240   Dressing Status Clean;Dry;Intact 06/16/22 1240   Drainage Appearance Bloody 06/16/22 1240   Status To bulb suction 06/16/22 1240       Urethral Catheter Non-latex;Silicone 16 Fr. (Active)   Daily Indications Selected surgeries ( tract, abdomen) 06/16/22 1240   Site Assessment Clean;Skin intact 06/16/22 1233   Collection Container Standard drainage bag 06/16/22 1240   Securement Method Securing device 06/16/22 1240   Output (mL) 1100 mL 06/16/22 1233       Output by Drain (mL) 06/18/22 0701 - 06/18/22 1900 06/18/22 1901 - 06/19/22 0700 06/19/22 0701 - 06/19/22 1024 Range Total   Closed/Suction Drain 1 Scalp Bulb 20 3 5 28       Imaging Results (Last 24 Hours)     ** No results found for the last 24 hours. **        Lab Results (last 24 hours)     Procedure Component Value Units Date/Time    POC Glucose Once [[id number]]  (Abnormal) Collected: 06/19/22 0829    Specimen: Blood Updated: 06/19/22 0840     Glucose 312 mg/dL      Comment: : [id number] Antolin VedantutanyMeter ID: [id number]       POC Glucose Once [[id number]]  (Abnormal) Collected: 06/19/22 0714    Specimen: Blood Updated: 06/19/22 0734     Glucose 305 mg/dL      Comment: : [id number] PlayWithtanyMeter ID: [id number]       Basic Metabolic Panel [[id number]]  (Abnormal) Collected: 06/19/22 0253    Specimen: Blood Updated: 06/19/22 0419     Glucose 351 mg/dL      BUN 22 mg/dL      Creatinine 0.91 mg/dL      Sodium 135 mmol/L      Potassium 4.0 mmol/L       Chloride 102 mmol/L      CO2 23.0 mmol/L      Calcium 8.4 mg/dL      BUN/Creatinine Ratio 24.2     Anion Gap 10.0 mmol/L      eGFR 93.0 mL/min/1.73      Comment: National Kidney Foundation and American Society of Nephrology (ASN) Task Force recommended calculation based on the Chronic Kidney Disease Epidemiology Collaboration (CKD-EPI) equation refit without adjustment for race.       Narrative:      GFR Normal >60  Chronic Kidney Disease <60  Kidney Failure <15      CBC & Differential [[id number]]  (Abnormal) Collected: 06/19/22 0253    Specimen: Blood Updated: 06/19/22 0400    Narrative:      The following orders were created for panel order CBC & Differential.  Procedure                               Abnormality         Status                     ---------                               -----------         ------                     CBC Auto Differential[[id number]]        Abnormal            Final result                 Please view results for these tests on the individual orders.    CBC Auto Differential [[id number]]  (Abnormal) Collected: 06/19/22 0253    Specimen: Blood Updated: 06/19/22 0400     WBC 13.67 10*3/mm3      RBC 3.59 10*6/mm3      Hemoglobin 11.5 g/dL      Hematocrit 34.3 %      MCV 95.5 fL      MCH 32.0 pg      MCHC 33.5 g/dL      RDW 12.1 %      RDW-SD 42.3 fl      MPV 11.4 fL      Platelets 191 10*3/mm3      Neutrophil % 88.2 %      Lymphocyte % 7.6 %      Monocyte % 3.1 %      Eosinophil % 0.0 %      Basophil % 0.1 %      Immature Grans % 1.0 %      Neutrophils, Absolute 12.05 10*3/mm3      Lymphocytes, Absolute 1.04 10*3/mm3      Monocytes, Absolute 0.43 10*3/mm3      Eosinophils, Absolute 0.00 10*3/mm3      Basophils, Absolute 0.01 10*3/mm3      Immature Grans, Absolute 0.14 10*3/mm3      nRBC 0.0 /100 WBC     POC Glucose Once [[id number]]  (Abnormal) Collected: 06/18/22 2034    Specimen: Blood Updated: 06/18/22 2045     Glucose 345 mg/dL      Comment: : [id number] [place] KathyMeter ID:  [id number]       POC Glucose Once [[id number]]  (Abnormal) Collected: 06/18/22 1654    Specimen: Blood Updated: 06/18/22 1705     Glucose 285 mg/dL      Comment: : [id number] Antolin CsaeyMeter ID: [id number]       POC Glucose Once [[id number]]  (Abnormal) Collected: 06/18/22 1104    Specimen: Blood Updated: 06/18/22 1115     Glucose 334 mg/dL      Comment: : [id number] Antolin CaseDefend Your Headtad ID: [id number]           [id number]  Rahul Arnold, APRN

## 2022-06-19 NOTE — PROGRESS NOTES
[facility] Medicine Services  INPATIENT PROGRESS NOTE    Patient Name: Elijah Escobar  Date of Admission: 6/16/2022  Today's Date: 06/19/22  Length of Stay: 3  Primary Care Physician: Dylan Lama APRN    Subjective   Chief Complaint: follow-up tachyarrhythmia  HPI   Patient went into atrial fibrillation with rapid ventricular response this morning.  He reports that earlier he had some palpitations, but those have resolved.  He does report a history of some form of tachyarrhythmia in the past, and states that he has been on sotalol for many years.  This was recently transitioned over to Coreg.  He denies any chest pain or chest pressure.  He reports no shortness of breath.  He did inform me that at home he typically takes 55 units of a long-acting insulin in the morning, and 65 units at night.  Denies any other pain symptoms.    Review of Systems     All pertinent negatives and positives are as above. All other systems have been reviewed and are negative unless otherwise stated.     Objective    Temp:  [97.9 °F (36.6 °C)-98.3 °F (36.8 °C)] 97.9 °F (36.6 °C)  Heart Rate:  [] 75  Resp:  [11-17] 14  BP: (110-194)/() 194/93  Physical Exam  Vitals reviewed.   Constitutional:       General: He is not in acute distress.  HENT:      Head: Normocephalic.      Mouth/Throat:      Mouth: Mucous membranes are moist.   Eyes:      Pupils: Pupils are equal, round, and reactive to light.   Cardiovascular:      Rate and Rhythm: Tachycardia present. Rhythm irregular.   Pulmonary:      Effort: Pulmonary effort is normal. No respiratory distress.      Breath sounds: No wheezing or rales.   Musculoskeletal:         General: No deformity.      Cervical back: Neck supple.   Skin:     General: Skin is warm.      Capillary Refill: Capillary refill takes less than 2 seconds.   Neurological:      General: No focal deficit present.      Mental Status: He is alert.   Psychiatric:         Mood  "and Affect: Mood normal.         Results Review:  I have reviewed the labs, radiology results, and diagnostic studies.    Laboratory Data:   Results from last 7 days   Lab Units 06/19/22  0253 06/18/22  0517 06/17/22  0249   WBC 10*3/mm3 13.67* 16.35* 14.20*   HEMOGLOBIN g/dL 11.5* 11.3* 12.6*   HEMATOCRIT % 34.3* 33.9* 38.4   PLATELETS 10*3/mm3 191 189 212        Results from last 7 days   Lab Units 06/19/22  0253 06/18/22  0517 06/17/22  0249   SODIUM mmol/L 135* 137 137   POTASSIUM mmol/L 4.0 4.0 4.1   CHLORIDE mmol/L 102 104 103   CO2 mmol/L 23.0 24.0 22.0   BUN mg/dL 22 22 15   CREATININE mg/dL 0.91 0.92 0.98   CALCIUM mg/dL 8.4* 8.6 8.6   GLUCOSE mg/dL 351* 357* 290*       Culture Data:   No results found for: BLOODCX, URINECX, WOUNDCX, MRSACX, RESPCX, STOOLCX    Radiology Data:   Imaging Results (Last 24 Hours)     ** No results found for the last 24 hours. **          I have reviewed the patient's current medications.     Assessment/Plan     Active Hospital Problems    Diagnosis    • **Meningioma (HCC)    • Paroxysmal atrial fibrillation with rapid ventricular response (HCC)    • Hypertension    • GERD (gastroesophageal reflux disease)    • Coronary artery disease    • Morbid obesity (HCC)    • Type 2 diabetes mellitus with hyperglycemia and peripheral neuropathy, with long-term current use of insulin (HCC)    • Leukocytosis    • Other specified anemias        Plan:  1.  Patient went into A. fib with RVR this morning.  Heart rate in the 130s when I assessed him this morning.  Continuous telemetry monitoring  2.  Start Cardizem IV drip  3.  Patient recently was transitioned from sotalol over to Coreg.  Patient reports that he has been on sotalol for \"years\" for a heart rhythm issue, however patient is unable to specify if that was specifically for atrial fibrillation.  Nonetheless, I am going to transition him back to sotalol today.  4.  Patient is on steroids which is exacerbating his hyperglycemia.  I am " going to titrate his insulin Levemir to 55 units subcutaneous twice daily.  5.  High dose sliding scale insulin coverage.  Monitor blood sugar trend closely  6.  Continue HCTZ, Norvasc, in addition to lisinopril.  7.  IV hydralazine and IV labetalol as needed per parameters.  8.  Currently on heparin at prophylactic dose only- status post craniotomy on 6/17  9.  Will go ahead and obtain Echocardiogram  10.  Add Mg and TSH level to labs already sent this AM      Electronically signed by Nghia Dangelo MD, 06/19/22, 08:39 CDT.

## 2022-06-19 NOTE — THERAPY TREATMENT NOTE
Acute Care - Physical Therapy Treatment Note  [facility]     Patient Name: Elijah Escobar  : 1955  MRN: [id number]  Today's Date: 2022      Visit Dx:     ICD-10-CM ICD-9-CM   1. Meningioma (HCC)  D32.9 225.2   2. Impaired mobility and ADLs  Z74.09 V49.89    Z78.9    3. Impaired mobility  Z74.09 799.89     Patient Active Problem List   Diagnosis   • Meningioma (HCC)   • Hypertension   • GERD (gastroesophageal reflux disease)   • Coronary artery disease   • Morbid obesity (HCC)   • Type 2 diabetes mellitus with hyperglycemia and peripheral neuropathy, with long-term current use of insulin (HCC)   • Leukocytosis   • Other specified anemias   • Paroxysmal atrial fibrillation with rapid ventricular response (HCC)     Past Medical History:   Diagnosis Date   • Brain tumor (HCC)    • Coronary artery disease    • COVID-19 vaccine series completed     MODERNA X 3; LAST DOSE 3/2022   • Diabetes (HCC)    • Erectile dysfunction    • GERD (gastroesophageal reflux disease)    • Hypercholesteremia    • Hypertension      Past Surgical History:   Procedure Laterality Date   • BALLOON ANGIOPLASTY, ARTERY Right    • COLONOSCOPY       PT Assessment (last 12 hours)     PT Evaluation and Treatment     Row Name 22 1402 22 1020       Physical Therapy Time and Intention    Subjective Information complains of;pain  -CHRISTIANO --    Document Type therapy note (daily note)  -CHRISTIANO --    Mode of Treatment physical therapy  -CHRISTIANO --    Session Not Performed -- patient/family declined, not feeling well  -CHRISTIANO    Comment, Session Not Performed -- nsg asked to check back this afternoon due to pt being in a-fib with RVR  -CHRISTIANO    Comment monitor HR and BP  -CHRISTIANO --    Row Name 22 1402          General Information    Existing Precautions/Restrictions fall  -CHRISTIANO     Row Name 22 1402          Pain    Pretreatment Pain Rating 4/10  -CHRISTIANO     Posttreatment Pain Rating 4/10  -CHRISTIANO     Pain Location - head  -CHRISTIANO     Row Name 22 1402           Bed Mobility    Supine-Sit Logan (Bed Mobility) supervision  -CHRISTIANO     Sit-Supine [place] (Bed Mobility) supervision  -CHRISTIANO     Row Name 06/19/22 1402          Transfers    Sit-Stand Logan (Transfers) standby assist  -CHRISTIANO     Stand-Sit Logan (Transfers) standby assist  -CHRISTIANO     Row Name 06/19/22 1402          Gait/Stairs (Locomotion)    Logan Level (Gait) verbal cues;contact guard;minimum assist (75% patient effort)  -CHRISTIANO     Distance in Feet (Gait) 200  -CHRISTIANO     Deviations/Abnormal Patterns (Gait) gait speed decreased;stride length decreased  -CHRISTIANO     Comment, (Gait/Stairs) pt had several slight LOB but was able self correct most of the time, required min assist twice  -CHRISTIANO     Row Name             Wound 06/16/22 0951 Right anterior scalp Incision    Wound - Properties Group Placement Date: 06/16/22  -JBA Placement Time: 0951 -JBA Side: Right  -JBA Orientation: anterior  -JBA Location: scalp  -JBA Primary Wound Type: Incision  -JBA     Retired Wound - Properties Group Placement Date: 06/16/22  -JBA Placement Time: 0951 -JBA Side: Right  -JBA Orientation: anterior  -JBA Location: scalp  -JBA Primary Wound Type: Incision  -JBA     Retired Wound - Properties Group Date first assessed: 06/16/22  -JBA Time first assessed: 0951 -JBA Side: Right  -JBA Location: scalp  -JBA Primary Wound Type: Incision  -JBA     Row Name 06/19/22 1402          Positioning and Restraints    Pre-Treatment Position in bed  -CHRISTIANO     Post Treatment Position bed  -CHRISTIANO     In Bed fowlers;call light within reach;encouraged to call for assist;side rails up x2  -CHRISTIANO           User Key  (r) = Recorded By, (t) = Taken By, (c) = Cosigned By    Initials Name Provider Type    Александр Morley, KYLAH Physical Therapist Assistant    Ricky Watts, RN Registered Nurse                Physical Therapy Education                 Title: PT OT SLP Therapies (In Progress)     Topic: Physical Therapy (In Progress)     Point:  Mobility training (Done)     Learning Progress Summary           Patient Acceptance, E, VU by CHRISTIANO at 6/19/2022 1402    Comment: safety with amb    Acceptance, E, VU by CHRISTIANO at 6/18/2022 0820    Comment: benefits of amb, safety    Acceptance, E, VU,DU by JUAN at 6/17/2022 0805    Comment: Pt presents to therapy s/p cranioctomy from meningioma. Pt was educated on benefits of ambulation to improve activity tolerance and strength. Anticipate d.c to home with assist when ready.                   Point: Home exercise program (Not Started)     Learner Progress:  Not documented in this visit.          Point: Body mechanics (Not Started)     Learner Progress:  Not documented in this visit.          Point: Precautions (Not Started)     Learner Progress:  Not documented in this visit.                      User Key     Initials Effective Dates Name Provider Type Discipline    CHRISTIANO 12/08/16 -  Александр Saravia PTA Physical Therapist Assistant PT    JUAN 05/04/22 -  Kemal Lin PT Student PT Student PT              PT Recommendation and Plan     Plan of Care Reviewed With: patient  Progress: improving  Outcome Evaluation: Pt was able to perform bed mobility with SBA.  Transfered sit to stand with CGA.  Amb 200' with CGA/min assist.  Pt had several slight LOB today, he was able to self correct most of the time, needed min assist twice.  Will continue to work with pt to increase strength, improve balance and amb.   Outcome Measures     Row Name 06/19/22 1402 06/18/22 0820          How much help from another person do you currently need...    Turning from your back to your side while in flat bed without using bedrails? 4  -CHRISTIANO 4  -CHRISTIANO     Moving from lying on back to sitting on the side of a flat bed without bedrails? 4  -CHRISTIANO 4  -CHRISTIANO     Moving to and from a bed to a chair (including a wheelchair)? 3  -CHRISTIANO 3  -CHRISTIANO     Standing up from a chair using your arms (e.g., wheelchair, bedside chair)? 3  -CHRISTIANO 3  -CHRISTIANO     Climbing 3-5 steps with a  railing? 3  -CHRISTIANO 3  -CHRISTIANO     To walk in hospital room? 3  -CHRISTIANO 3  -CHRISTIANO     AM-PAC 6 Clicks Score (PT) 20  -CHRISTIANO 20  -CHRISTIANO            Functional Assessment    Outcome Measure Options AM-PAC 6 Clicks Basic Mobility (PT)  -CHRISTIANO AM-PAC 6 Clicks Basic Mobility (PT)  -CHRISTIANO           User Key  (r) = Recorded By, (t) = Taken By, (c) = Cosigned By    Initials Name Provider Type    Александр Morley PTA Physical Therapist Assistant                 Time Calculation:    PT Charges     Row Name 06/19/22 1402             Time Calculation    Start Time 1402  -CHRISTIANO      Stop Time 1418  -CHRISTIANO      Time Calculation (min) 16 min  -CHRISTIANO      PT Received On 06/19/22  -CHRISTIANO              Time Calculation- PT    Total Timed Code Minutes- PT 16 minute(s)  -CHRISTIANO              Timed Charges    44440 - Gait Training Minutes  16  -CHRISTIANO              Total Minutes    Timed Charges Total Minutes 16  -CHRISTIANO       Total Minutes 16  -CHRISTIANO            User Key  (r) = Recorded By, (t) = Taken By, (c) = Cosigned By    Initials Name Provider Type    Александр Morley PTA Physical Therapist Assistant              Therapy Charges for Today     Code Description Service Date Service Provider Modifiers Qty    50878543348 HC GAIT TRAINING EA 15 MIN 6/18/2022 Александр Saravia, [place]LAH GP 1    26899978762 HC GAIT TRAINING EA 15 MIN 6/19/2022 Александр Saravia PTA GP 1          PT G-Codes  Outcome Measure Options: AM-PAC 6 Clicks Basic Mobility (PT)  AM-PAC 6 Clicks Score (PT): 20  AM-PAC 6 Clicks Score (OT): 21    Александр Saravia PTA  6/19/2022

## 2022-06-19 NOTE — PLAN OF CARE
Goal Outcome Evaluation:  Plan of Care Reviewed With: patient            Patient is transferred from icu. Arrived on wheel chair accompanied by ICU nurse. Alert and oriented x4. VSS. RA. Incision site dressing CDI. No change in neuro. Numbness and cold sensation at fbilateral foot. Cardizem drip 15 mg /hr at arrival. HR was below 110. MD notified and rate decreased to 5mg/hr. MD ok to continue drip at 5 mg as long as HR is below goal rate (below 110/m) and may stop before next Sotalol dose at tonight. Tele continue, Afib. Stand by assist to toilet. Voids spontaneously. LBM . 6/15. Continue to monitor.

## 2022-06-19 NOTE — PLAN OF CARE
Goal Outcome Evaluation:  Plan of Care Reviewed With: patient        Progress: improving  Outcome Evaluation: Pt was able to perform bed mobility with SBA.  Transfered sit to stand with CGA.  Amb 200' with CGA/min assist.  Pt had several slight LOB today, he was able to self correct most of the time, needed min assist twice.  Will continue to work with pt to increase strength, improve balance and amb.

## 2022-06-20 ENCOUNTER — READMISSION MANAGEMENT (OUTPATIENT)
Dept: CALL CENTER | Facility: HOSPITAL | Age: 67
End: 2022-06-20

## 2022-06-20 VITALS
BODY MASS INDEX: 39.84 KG/M2 | HEIGHT: 70 IN | OXYGEN SATURATION: 98 % | WEIGHT: 278.3 LBS | TEMPERATURE: 98.5 F | HEART RATE: 87 BPM | SYSTOLIC BLOOD PRESSURE: 132 MMHG | DIASTOLIC BLOOD PRESSURE: 78 MMHG | RESPIRATION RATE: 20 BRPM

## 2022-06-20 LAB
ANION GAP SERPL CALCULATED.3IONS-SCNC: 10 MMOL/L (ref 5–15)
BASOPHILS # BLD AUTO: 0.01 10*3/MM3 (ref 0–0.2)
BASOPHILS NFR BLD AUTO: 0.1 % (ref 0–1.5)
BH CV ECHO MEAS - AO MAX PG: 2.08 MMHG
BH CV ECHO MEAS - AO MEAN PG: 1 MMHG
BH CV ECHO MEAS - AO ROOT DIAM: 3.9 CM
BH CV ECHO MEAS - AO V2 MAX: 72.1 CM/SEC
BH CV ECHO MEAS - AO V2 VTI: 12.5 CM
BH CV ECHO MEAS - AVA(I,D): 4 CM2
BH CV ECHO MEAS - EDV(CUBED): 104.5 ML
BH CV ECHO MEAS - EDV(MOD-SP4): 98.3 ML
BH CV ECHO MEAS - EF(MOD-SP4): 49.6 %
BH CV ECHO MEAS - ESV(CUBED): 34.6 ML
BH CV ECHO MEAS - ESV(MOD-SP4): 49.5 ML
BH CV ECHO MEAS - FS: 30.8 %
BH CV ECHO MEAS - IVS/LVPW: 1.12 CM
BH CV ECHO MEAS - IVSD: 1.47 CM
BH CV ECHO MEAS - LA DIMENSION: 4.2 CM
BH CV ECHO MEAS - LAT PEAK E' VEL: 13.2 CM/SEC
BH CV ECHO MEAS - LV DIASTOLIC VOL/BSA (35-75): 41.1 CM2
BH CV ECHO MEAS - LV MASS(C)D: 263.3 GRAMS
BH CV ECHO MEAS - LV MAX PG: 1.33 MMHG
BH CV ECHO MEAS - LV MEAN PG: 1 MMHG
BH CV ECHO MEAS - LV SYSTOLIC VOL/BSA (12-30): 20.7 CM2
BH CV ECHO MEAS - LV V1 MAX: 56.9 CM/SEC
BH CV ECHO MEAS - LV V1 VTI: 8.7 CM
BH CV ECHO MEAS - LVIDD: 4.7 CM
BH CV ECHO MEAS - LVIDS: 3.3 CM
BH CV ECHO MEAS - LVOT AREA: 5.7 CM2
BH CV ECHO MEAS - LVOT DIAM: 2.7 CM
BH CV ECHO MEAS - LVPWD: 1.31 CM
BH CV ECHO MEAS - MED PEAK E' VEL: 9.8 CM/SEC
BH CV ECHO MEAS - MV DEC TIME: 0.16 MSEC
BH CV ECHO MEAS - MV E MAX VEL: 69.2 CM/SEC
BH CV ECHO MEAS - PA V2 MAX: 41.7 CM/SEC
BH CV ECHO MEAS - PI END-D VEL: 158 CM/SEC
BH CV ECHO MEAS - RAP SYSTOLE: 5 MMHG
BH CV ECHO MEAS - RVSP: 25.8 MMHG
BH CV ECHO MEAS - SI(MOD-SP4): 20.4 ML/M2
BH CV ECHO MEAS - SV(LVOT): 49.9 ML
BH CV ECHO MEAS - SV(MOD-SP4): 48.8 ML
BH CV ECHO MEAS - TR MAX PG: 20.8 MMHG
BH CV ECHO MEAS - TR MAX VEL: 228 CM/SEC
BH CV ECHO MEASUREMENTS AVERAGE E/E' RATIO: 6.02
BUN SERPL-MCNC: 25 MG/DL (ref 8–23)
BUN/CREAT SERPL: 28.1 (ref 7–25)
CALCIUM SPEC-SCNC: 8.5 MG/DL (ref 8.6–10.5)
CHLORIDE SERPL-SCNC: 104 MMOL/L (ref 98–107)
CO2 SERPL-SCNC: 24 MMOL/L (ref 22–29)
CREAT SERPL-MCNC: 0.89 MG/DL (ref 0.76–1.27)
DEPRECATED RDW RBC AUTO: 42.5 FL (ref 37–54)
EGFRCR SERPLBLD CKD-EPI 2021: 94.5 ML/MIN/1.73
EOSINOPHIL # BLD AUTO: 0 10*3/MM3 (ref 0–0.4)
EOSINOPHIL NFR BLD AUTO: 0 % (ref 0.3–6.2)
ERYTHROCYTE [DISTWIDTH] IN BLOOD BY AUTOMATED COUNT: 12.2 % (ref 12.3–15.4)
GLUCOSE BLDC GLUCOMTR-MCNC: 290 MG/DL (ref 70–130)
GLUCOSE SERPL-MCNC: 312 MG/DL (ref 65–99)
HCT VFR BLD AUTO: 36.1 % (ref 37.5–51)
HGB BLD-MCNC: 12.1 G/DL (ref 13–17.7)
IMM GRANULOCYTES # BLD AUTO: 0.15 10*3/MM3 (ref 0–0.05)
IMM GRANULOCYTES NFR BLD AUTO: 0.9 % (ref 0–0.5)
LEFT ATRIUM VOLUME INDEX: 32.8 ML/M2
LEFT ATRIUM VOLUME: 78.3 ML
LYMPHOCYTES # BLD AUTO: 1.11 10*3/MM3 (ref 0.7–3.1)
LYMPHOCYTES NFR BLD AUTO: 6.7 % (ref 19.6–45.3)
MAXIMAL PREDICTED HEART RATE: 154 BPM
MCH RBC QN AUTO: 31.8 PG (ref 26.6–33)
MCHC RBC AUTO-ENTMCNC: 33.5 G/DL (ref 31.5–35.7)
MCV RBC AUTO: 95 FL (ref 79–97)
MONOCYTES # BLD AUTO: 0.83 10*3/MM3 (ref 0.1–0.9)
MONOCYTES NFR BLD AUTO: 5 % (ref 5–12)
NEUTROPHILS NFR BLD AUTO: 14.5 10*3/MM3 (ref 1.7–7)
NEUTROPHILS NFR BLD AUTO: 87.3 % (ref 42.7–76)
NRBC BLD AUTO-RTO: 0 /100 WBC (ref 0–0.2)
PLATELET # BLD AUTO: 208 10*3/MM3 (ref 140–450)
PMV BLD AUTO: 11.1 FL (ref 6–12)
POTASSIUM SERPL-SCNC: 4.1 MMOL/L (ref 3.5–5.2)
QT INTERVAL: 372 MS
QT INTERVAL: 410 MS
QTC INTERVAL: 484 MS
QTC INTERVAL: 496 MS
RBC # BLD AUTO: 3.8 10*6/MM3 (ref 4.14–5.8)
SODIUM SERPL-SCNC: 138 MMOL/L (ref 136–145)
STRESS TARGET HR: 131 BPM
WBC NRBC COR # BLD: 16.6 10*3/MM3 (ref 3.4–10.8)

## 2022-06-20 PROCEDURE — 97116 GAIT TRAINING THERAPY: CPT

## 2022-06-20 PROCEDURE — 97535 SELF CARE MNGMENT TRAINING: CPT

## 2022-06-20 PROCEDURE — 63710000001 INSULIN LISPRO (HUMAN) PER 5 UNITS: Performed by: INTERNAL MEDICINE

## 2022-06-20 PROCEDURE — 93005 ELECTROCARDIOGRAM TRACING: CPT

## 2022-06-20 PROCEDURE — 93010 ELECTROCARDIOGRAM REPORT: CPT | Performed by: EMERGENCY MEDICINE

## 2022-06-20 PROCEDURE — 63710000001 DEXAMETHASONE PER 0.25 MG: Performed by: NURSE PRACTITIONER

## 2022-06-20 PROCEDURE — 82962 GLUCOSE BLOOD TEST: CPT

## 2022-06-20 PROCEDURE — 63710000001 INSULIN DETEMIR PER 5 UNITS: Performed by: INTERNAL MEDICINE

## 2022-06-20 PROCEDURE — 93010 ELECTROCARDIOGRAM REPORT: CPT | Performed by: INTERNAL MEDICINE

## 2022-06-20 PROCEDURE — 99024 POSTOP FOLLOW-UP VISIT: CPT | Performed by: NURSE PRACTITIONER

## 2022-06-20 PROCEDURE — 85025 COMPLETE CBC W/AUTO DIFF WBC: CPT | Performed by: NURSE PRACTITIONER

## 2022-06-20 PROCEDURE — 25010000002 HEPARIN (PORCINE) PER 1000 UNITS: Performed by: NURSE PRACTITIONER

## 2022-06-20 PROCEDURE — 93005 ELECTROCARDIOGRAM TRACING: CPT | Performed by: INTERNAL MEDICINE

## 2022-06-20 PROCEDURE — 80048 BASIC METABOLIC PNL TOTAL CA: CPT | Performed by: NURSE PRACTITIONER

## 2022-06-20 RX ORDER — AMOXICILLIN 250 MG
1 CAPSULE ORAL DAILY
Qty: 30 TABLET | Refills: 0 | Status: SHIPPED | OUTPATIENT
Start: 2022-06-20 | End: 2022-07-11 | Stop reason: HOSPADM

## 2022-06-20 RX ORDER — HYDROCODONE BITARTRATE AND ACETAMINOPHEN 10; 325 MG/1; MG/1
1 TABLET ORAL EVERY 6 HOURS PRN
Refills: 0 | Status: ON HOLD
Start: 2022-06-22 | End: 2022-06-24

## 2022-06-20 RX ORDER — DEXAMETHASONE 2 MG/1
TABLET ORAL
Qty: 10 TABLET | Refills: 0 | Status: ON HOLD | OUTPATIENT
Start: 2022-06-20 | End: 2022-06-24

## 2022-06-20 RX ORDER — SOTALOL HYDROCHLORIDE 80 MG/1
40 TABLET ORAL ONCE
Status: COMPLETED | OUTPATIENT
Start: 2022-06-20 | End: 2022-06-20

## 2022-06-20 RX ORDER — HYDROCODONE BITARTRATE AND ACETAMINOPHEN 7.5; 325 MG/1; MG/1
2 TABLET ORAL EVERY 6 HOURS PRN
Qty: 24 TABLET | Refills: 0 | Status: ON HOLD | OUTPATIENT
Start: 2022-06-20 | End: 2022-06-24

## 2022-06-20 RX ORDER — SOTALOL HYDROCHLORIDE 120 MG/1
120 TABLET ORAL 2 TIMES DAILY
Qty: 60 TABLET | Refills: 0 | Status: ON HOLD | OUTPATIENT
Start: 2022-06-20 | End: 2022-06-24

## 2022-06-20 RX ORDER — BUTALBITAL, ACETAMINOPHEN AND CAFFEINE 50; 325; 40 MG/1; MG/1; MG/1
1 TABLET ORAL EVERY 6 HOURS PRN
Qty: 28 TABLET | Refills: 0 | Status: SHIPPED | OUTPATIENT
Start: 2022-06-20 | End: 2022-06-27

## 2022-06-20 RX ORDER — BISACODYL 10 MG
10 SUPPOSITORY, RECTAL RECTAL DAILY
Status: DISCONTINUED | OUTPATIENT
Start: 2022-06-20 | End: 2022-06-20 | Stop reason: HOSPADM

## 2022-06-20 RX ORDER — SOTALOL HYDROCHLORIDE 80 MG/1
120 TABLET ORAL EVERY 12 HOURS SCHEDULED
Status: DISCONTINUED | OUTPATIENT
Start: 2022-06-20 | End: 2022-06-20 | Stop reason: HOSPADM

## 2022-06-20 RX ADMIN — DEXAMETHASONE 4 MG: 4 TABLET ORAL at 08:37

## 2022-06-20 RX ADMIN — AMLODIPINE BESYLATE 10 MG: 10 TABLET ORAL at 08:36

## 2022-06-20 RX ADMIN — BISACODYL 10 MG: 10 SUPPOSITORY RECTAL at 08:39

## 2022-06-20 RX ADMIN — LEVETIRACETAM 500 MG: 500 TABLET, FILM COATED ORAL at 08:36

## 2022-06-20 RX ADMIN — HYDROCHLOROTHIAZIDE 25 MG: 25 TABLET ORAL at 08:35

## 2022-06-20 RX ADMIN — HEPARIN SODIUM 5000 UNITS: 5000 INJECTION INTRAVENOUS; SUBCUTANEOUS at 08:35

## 2022-06-20 RX ADMIN — LISINOPRIL 40 MG: 20 TABLET ORAL at 08:37

## 2022-06-20 RX ADMIN — BUTALBITAL, ACETAMINOPHEN AND CAFFEINE 1 TABLET: 50; 325; 40 TABLET ORAL at 08:35

## 2022-06-20 RX ADMIN — INSULIN DETEMIR 55 UNITS: 100 INJECTION, SOLUTION SUBCUTANEOUS at 08:40

## 2022-06-20 RX ADMIN — INSULIN LISPRO 12 UNITS: 100 INJECTION, SOLUTION INTRAVENOUS; SUBCUTANEOUS at 08:35

## 2022-06-20 RX ADMIN — SOTALOL HYDROCHLORIDE 80 MG: 80 TABLET ORAL at 08:36

## 2022-06-20 RX ADMIN — GABAPENTIN 400 MG: 400 CAPSULE ORAL at 08:35

## 2022-06-20 RX ADMIN — SOTALOL HYDROCHLORIDE 40 MG: 80 TABLET ORAL at 10:24

## 2022-06-20 NOTE — DISCHARGE SUMMARY
DISCHARGE SUMMARY FROM HOSPITAL    Date of Discharge:  6/20/2022    Presenting Diagnosis/History of Present Illness  Meningioma (HCC) [D32.9]    Medical History   Patient Active Problem List   Diagnosis   • Meningioma (HCC)   • Hypertension   • GERD (gastroesophageal reflux disease)   • Coronary artery disease   • Morbid obesity (HCC)   • Type 2 diabetes mellitus with hyperglycemia and peripheral neuropathy, with long-term current use of insulin (HCC)   • Leukocytosis   • Other specified anemias   • Paroxysmal atrial fibrillation with rapid ventricular response (HCC)     Discharge Diagnosis/ Active Hospital Problems:   Active Hospital Problems    Diagnosis    • **Meningioma (HCC)    • Paroxysmal atrial fibrillation with rapid ventricular response (HCC)    • Hypertension    • GERD (gastroesophageal reflux disease)    • Coronary artery disease    • Morbid obesity (HCC)    • Type 2 diabetes mellitus with hyperglycemia and peripheral neuropathy, with long-term current use of insulin (HCC)    • Leukocytosis    • Other specified anemias      Hospital Course  Patient is a 66 y.o. male presented with a significant medical history of coronary artery disease, diabetes, erectile dysfunction, GERD, hypertension, hyperlipidemia. He presents with a new problem of 2 episodes of intermittent amnesia suggestive of seizures. Physical exam findings of left pronator drift and dysmetria with left finger-nose otherwise neurologically intact.  His imaging shows avidly contrast-enhancing meningeal based mass with dural tails measuring 2.8 x 2.8 x 2.0 cm with surrounding FLAIR signal.      On 6/16/2022 the patient was brought to the main operating room where he underwent an uneventful craniotomy for tumor removal.  Postoperatively he  did extremely well and his neurological exam remained unchanged.  He was kept in the hospital for close neurologic monitoring, airway observation, and for pain control.  His JUAN drain has been removed.  He has  been able to ambulate, tolerate oral diet, oral pain medications and void.   He has been evaluated by physical therapy and occupational therapy and deemed safe for discharge home with family.   Postoperative education was performed at bedside which included wound care instructions, maximal physical activity, use of postoperative pain medication including tapering, and indications for contacting the neurosurgical office vs the emergency department.  Arrangements for postoperative follow-up should be provided prior to hospital discharge.  He will be provided with an appropriate course of oral medication for pain control, continuation of Keppra, and a titrating dose of Decadron.  Additional information provided in hospital discharge instructions.    Procedures Performed  Procedure(s):  CRANIOTOMY FOR TUMOR STERIOTACTIC WITH BRAIN LAB, right     Consults:   Consults     Date and Time Order Name Status Description    6/17/2022  8:39 AM Inpatient Hospitalist Consult Completed         Pertinent Test Results:   CT Head Without Contrast    Result Date: 6/16/2022  1. Status post craniotomy on the right with resection of a tumor from the frontal dural space. A subdural drain is located in this area. There is air in the subdural space. There is minimal hemorrhage in the subdural space. 2. Vasogenic edema in the right frontal white matter remains stable. There is continued mild mass effect with sulcal effacement and mild compression of the right lateral ventricle.  This report was finalized on 06/16/2022 17:43 by Dr. Arian Stauffer MD.    MRI Brain With & Without Contrast    Result Date: 5/26/2022  1. Similar appearing 2.8 x 2.5 x 2.3 cm homogeneously enhancing extra-axial lesion along the vertex of the right frontal lobe with underlying reactive gliosis and effacement of the frontal horn right lateral ventricle most consistent with meningioma. Similar when comparing to [facility] MRI brain exam 3/25/2022.   This report was finalized on 05/26/2022 20:43 by Dr. Coleen Terry MD.    XR Chest 1 View    Result Date: 5/26/2022   No acute findings. This report was finalized on 05/26/2022 15:42 by Dr. Mk Chinchilla MD.    CBC:   Results from last 7 days   Lab Units 06/20/22  0521 06/19/22  0253 06/18/22  0517 06/17/22  0249   WBC 10*3/mm3 16.60* 13.67* 16.35* 14.20*   HEMOGLOBIN g/dL 12.1* 11.5* 11.3* 12.6*   HEMATOCRIT % 36.1* 34.3* 33.9* 38.4   PLATELETS 10*3/mm3 208 191 189 212     BMP:  Results from last 7 days   Lab Units 06/20/22  0521 06/19/22 0253 06/18/22  0517 06/17/22  0249   SODIUM mmol/L 138 135* 137 137   POTASSIUM mmol/L 4.1 4.0 4.0 4.1   CHLORIDE mmol/L 104 102 104 103   CO2 mmol/L 24.0 23.0 24.0 22.0   BUN mg/dL 25* 22 22 15   CREATININE mg/dL 0.89 0.91 0.92 0.98   GLUCOSE mg/dL 312* 351* 357* 290*   CALCIUM mg/dL 8.5* 8.4* 8.6 8.6     SURGICAL PATHOLOGY RESULTS  Lab Results   Component Value Date    FINALDX  06/16/2022     Brain tumor, craniotomy:  Meningioma, meningothelial type (WHO grade I)      SYNOPTIC  06/16/2022     CNS: Histological Assessment  CNS: HISTOLOGICAL ASSESSMENT - All Specimens  Protocol posted: 2/26/2020    CLINICAL     History of Prior Therapy for this Neoplasm:    Not known      History of Previous Tumor and / or Familial Syndrome:    Not known      Neuroimaging Findings:    Not available     SPECIMEN     Procedure:    Resection      Specimen Size, Gross Description:    Greatest Dimension (Centimeters): 3.0 cm    TUMOR     Tumor Site:    Dura      Tumor Laterality:    Not specified      Tumor Focality:    Unifocal      Histologic Type:           :    Meningothelial meningioma      Histologic Grade:    WHO Grade I      Treatment Effect (Histological Evidence of Prior Therapy):    Not identified     SPECIAL STUDIES     Biomarker Studies:    Not performed      Designate block for future studies:    1A       GROSSDES  06/16/2022     1. Brain.   Received in a formalin filled container  "labeled with the patient's name, date of birth, and \"brain tumor for permanent\".  The specimen consists of a flap of dura with intact yellow-pink, slightly lobulated mass.  The dura measures 3.8 x 2.2 cm and measures less than 0.1 cm in thickness.  The intact, attached mass measures 3.0 x 2.5 x 2.3 cm.  1 side of the dura is inked blue.  The cut surface of the mass shows a yellow to gray, solid and spongy surface with no focal areas of discoloration identified.  Representative sections of the mass and attached dura are wrapped in lens paper and submitted in blocks 1A through 1C.          MICRO  06/16/2022     Histologic sections show a well-circumscribed brain lesion consisting of spindled to oval cells arranged in fascicles and whirls.  No significant cytologic atypia or increased mitotic activity is present.       Culture Results: No results found for: BLOODCX, URINECX, WOUNDCX, MRSACX, RESPCX, STOOLCX    Condition on Discharge:  Stable    Vital Signs  Temp:  [97.8 °F (36.6 °C)-98.9 °F (37.2 °C)] 98.3 °F (36.8 °C)  Heart Rate:  [] 73  Resp:  [16-18] 18  BP: (108-156)/() 147/71    Physical Exam:   Physical Exam  Vitals and nursing note reviewed.   Constitutional:       General: He is not in acute distress.     Appearance: Normal appearance. He is well-developed and well-groomed. He is obese. He is not ill-appearing, toxic-appearing or diaphoretic.      Comments: BMI 39.98   HENT:      Head: Normocephalic and atraumatic.        Right Ear: Hearing normal.      Left Ear: Hearing normal.   Eyes:      Extraocular Movements: EOM normal.      Conjunctiva/sclera: Conjunctivae normal.      Pupils: Pupils are equal, round, and reactive to light.   Neck:      Trachea: Trachea normal.   Cardiovascular:      Rate and Rhythm: Normal rate and regular rhythm.   Pulmonary:      Effort: Pulmonary effort is normal. No tachypnea, bradypnea, accessory muscle usage or respiratory distress.   Abdominal:      Palpations: " Abdomen is soft.   Musculoskeletal:      Cervical back: Full passive range of motion without pain and neck supple.   Skin:     General: Skin is warm and dry.   Neurological:      Mental Status: He is alert.      GCS: GCS eye subscore is 4. GCS verbal subscore is 5. GCS motor subscore is 6.   Psychiatric:         Speech: Speech normal.         Behavior: Behavior normal. Behavior is cooperative.        Neurologic Exam     Mental Status   Oriented to person.   Disoriented to place.   Oriented to year.   Attention: normal. Concentration: normal.   Speech: speech is normal   Level of consciousness: alert    Bright and awake.  Oriented by 3.  Follows commands without prompting showing thumbs up and 2 fingers bilaterally.  No pronator drift or dysmetria.      Cranial Nerves     CN II   Visual fields full to confrontation.     CN III, IV, VI   Pupils are equal, round, and reactive to light.  Extraocular motions are normal.     CN V   Facial sensation intact.     CN VII   Facial expression full, symmetric.     CN VIII   CN VIII normal.     CN IX, X   CN IX normal.     CN XI   CN XI normal.     Motor Exam   Right arm tone: normal  Left arm tone: normal  Right leg tone: normal  Left leg tone: normal    Strength   Right deltoid: 5/5  Left deltoid: 5/5  Right biceps: 5/5  Left biceps: 5/5  Right triceps: 5/5  Left triceps: 5/5  Right wrist extension: 5/5  Left wrist extension: 5/5  Right iliopsoas: 5/5  Left iliopsoas: 5/5  Right quadriceps: 5/5  Left quadriceps: 5/5  Right anterior tibial: 5/5  Left anterior tibial: 5/5  Right gastroc: 5/5  Left gastroc: 5/5  Right EHL 5/5  Left EHL 5/5       Sensory Exam   Right arm light touch: normal  Left arm light touch: normal  Right leg light touch: normal  Left leg light touch: normal    Gait, Coordination, and Reflexes     Tremor   Resting tremor: absent  Intention tremor: absent  Action tremor: absent    Discharge Disposition  Home or Self Care    Discharge Medications     Discharge  Medications      New Medications      Instructions Start Date   butalbital-acetaminophen-caffeine -40 MG per tablet  Commonly known as: FIORICET, ESGIC   1 tablet, Oral, Every 6 Hours PRN      dexamethasone 2 MG tablet  Commonly known as: DECADRON   Take 2 tablets by mouth 2 (Two) Times a Day for 1 day, THEN 1 tablet 2 (Two) Times a Day for 2 days, THEN 0.5 tablets 2 (Two) Times a Day for 2 days.   Start Date: June 20, 2022     sennosides-docusate 8.6-50 MG per tablet  Commonly known as: PERICOLACE   1 tablet, Oral, Daily         Changes to Medications      Instructions Start Date   HYDROcodone-acetaminophen 7.5-325 MG per tablet  Commonly known as: COLIN  What changed: You were already taking a medication with the same name, and this prescription was added. Make sure you understand how and when to take each.   2 tablets, Oral, Every 6 Hours PRN      HYDROcodone-acetaminophen  MG per tablet  Commonly known as: COLIN  What changed: These instructions start on June 22, 2022. If you are unsure what to do until then, ask your doctor or other care provider.   1 tablet, Oral, Every 6 Hours PRN   Start Date: June 22, 2022        Continue These Medications      Instructions Start Date   amLODIPine 10 MG tablet  Commonly known as: NORVASC   10 mg, Oral, Daily      atorvastatin 20 MG tablet  Commonly known as: LIPITOR   20 mg, Oral, Nightly      gabapentin 300 MG capsule  Commonly known as: NEURONTIN   300 mg, Oral, 3 Times Daily      Lantus SoloStar 100 UNIT/ML injection pen  Generic drug: Insulin Glargine   120 Units, Subcutaneous, Nightly, 55 units in the morning and 65 units at night      levETIRAcetam 500 MG tablet  Commonly known as: Keppra   500 mg, Oral, 2 Times Daily      lisinopril 40 MG tablet  Commonly known as: PRINIVIL,ZESTRIL   40 mg, Oral, Daily      omeprazole 20 MG capsule  Commonly known as: priLOSEC   20 mg, Oral, Daily      sotalol 80 MG tablet  Commonly known as: BETAPACE   80 mg, Oral, 2  Times Daily           Discharge Diet:   Diet Instructions     Advance Diet As Tolerated           Activity at Discharge:   Activity Instructions     Activity as Tolerated      Bathing Restrictions      Showers only.  No tub baths.  Shower daily.  Allow clean soapy water to cleanse incision.  Do not scrub.    Type of Restriction: Bathing    Bathing Restrictions: No Tub Bath    Driving Restrictions      Type of Restriction: Driving    Driving Restrictions: No Driving While Taking Narcotics    Gradually Increase Activity Until at Pre-Hospitalization Level      Lifting Restrictions      Type of Restriction: Lifting    Lifting Restrictions: Lifting Restriction (Indicate Limit)    Weight Limit (Pounds): 8    Length of Lifting Restriction: 2-3 WEEKS         Follow-up Appointments  No future appointments.  Additional Instructions for the Follow-ups that You Need to Schedule     Call MD With Problems / Concerns   As directed      Instructions: CALL WITH ANY NEW OR ADDITIONAL CONCERNS.    Order Comments: Instructions: CALL WITH ANY NEW OR ADDITIONAL CONCERNS.          Discharge Follow-up with Specified Provider: Dr. Portillo; 1 Month   As directed      To: Dr. Portillo    Follow Up: 1 Month    Follow Up Details: MRI brain prior to arrival         Discharge Follow-up with Specified Provider: ADITYA Gallego; 2 Weeks   As directed      To: ADITYA Gallego    Follow Up: 2 Weeks         MRI Brain With & Without Contrast   Jul 20, 2022      STEREOTACTIC GUIDANCE PROTOCOL?: No    Will fiducial markers be needed for this procedure?: No    Release to patient: Immediate             Test Results Pending at Discharge  Pending Labs     Order Current Status    Surgical Pathology Exam Collected (06/16/22 1102)      None     ADITYA Cespedes  06/20/22  08:44 CDT    [id number]

## 2022-06-20 NOTE — PLAN OF CARE
Goal Outcome Evaluation:  Plan of Care Reviewed With: patient            Patient is alert and oriented x4 . VSS , HR increased with activity. Room air. No change in neuro . Stand by assist. Voids in bathroom . Takaen shower today with permission of Nurse Practitioner lucy. BM today after suppository. Tolerating well eating and drinking. Discharge instruction reviewed with patient. Education provided on activity and lifting restrictions, sings of incision site infection and seeking medical attention, medication. Verbalized understanding. IV removed. Dc'd Tele . Patient is escorted on wheel chair by staff to parking site. Family to provide ride to home.

## 2022-06-20 NOTE — PROGRESS NOTES
[facility] Medicine Services  INPATIENT PROGRESS NOTE    Patient Name: Elijah Escobar  Date of Admission: 6/16/2022  Today's Date: 06/20/22  Length of Stay: 4  Primary Care Physician: Dylan Lama APRN    Subjective   Chief Complaint: Follow-up  HPI   Patient is sitting up in bed eating breakfast this morning.  He reports that he is feeling well today.  Neurosurgery has told him that if he is able to have a bowel movement he may be able to go home.  He does not have any complaints today.  He denies nausea or vomiting.  He denies chest pain or shortness of breath.  He denies any further palpitations.      Review of Systems   All pertinent negatives and positives are as above. All other systems have been reviewed and are negative unless otherwise stated.     Objective    Temp:  [97.8 °F (36.6 °C)-98.9 °F (37.2 °C)] 98.3 °F (36.8 °C)  Heart Rate:  [] 73  Resp:  [16-18] 18  BP: (108-156)/() 147/71  Physical Exam  Vitals and nursing note reviewed.   Constitutional:       General: He is not in acute distress.     Appearance: He is obese. He is not ill-appearing.   HENT:      Head: Normocephalic.   Cardiovascular:      Rate and Rhythm: Normal rate. Rhythm irregular.      Pulses: Normal pulses.      Heart sounds: Normal heart sounds. No murmur heard.    No friction rub.      Comments: Atrial fibrillation 97  Pulmonary:      Effort: Pulmonary effort is normal. No respiratory distress.      Breath sounds: Normal breath sounds. No wheezing.      Comments: Room air  Abdominal:      General: Bowel sounds are normal. There is no distension.      Palpations: Abdomen is soft.      Tenderness: There is no abdominal tenderness.   Musculoskeletal:         General: No swelling or tenderness. Normal range of motion.      Cervical back: Normal range of motion. No rigidity or tenderness.   Skin:     General: Skin is warm and dry.      Capillary Refill: Capillary refill takes less than  2 seconds.      Findings: No bruising, lesion or rash.   Neurological:      Mental Status: He is alert and oriented to person, place, and time. Mental status is at baseline.   Psychiatric:         Mood and Affect: Mood normal.         Behavior: Behavior normal.         Thought Content: Thought content normal.         Judgment: Judgment normal.           Results Review:  I have reviewed the labs, radiology results, and diagnostic studies.    Laboratory Data:   Results from last 7 days   Lab Units 06/20/22 0521 06/19/22 0253 06/18/22 0517   WBC 10*3/mm3 16.60* 13.67* 16.35*   HEMOGLOBIN g/dL 12.1* 11.5* 11.3*   HEMATOCRIT % 36.1* 34.3* 33.9*   PLATELETS 10*3/mm3 208 191 189        Results from last 7 days   Lab Units 06/20/22 0521 06/19/22 0253 06/18/22 0517   SODIUM mmol/L 138 135* 137   POTASSIUM mmol/L 4.1 4.0 4.0   CHLORIDE mmol/L 104 102 104   CO2 mmol/L 24.0 23.0 24.0   BUN mg/dL 25* 22 22   CREATININE mg/dL 0.89 0.91 0.92   CALCIUM mg/dL 8.5* 8.4* 8.6   GLUCOSE mg/dL 312* 351* 357*       Culture Data:   No results found for: BLOODCX, URINECX, WOUNDCX, MRSACX, RESPCX, STOOLCX    Radiology Data:   Imaging Results (Last 24 Hours)     ** No results found for the last 24 hours. **          I have reviewed the patient's current medications.     Assessment/Plan     Active Hospital Problems    Diagnosis    • **Meningioma (HCC)    • Paroxysmal atrial fibrillation with rapid ventricular response (HCC)    • Hypertension    • GERD (gastroesophageal reflux disease)    • Coronary artery disease    • Morbid obesity (HCC)    • Type 2 diabetes mellitus with hyperglycemia and peripheral neuropathy, with long-term current use of insulin (HCC)    • Leukocytosis    • Other specified anemias        Plan:  1.  Patient presented for craniotomy with stereotactic tumor removal on 6/16/2022.  This was performed by Dr. Portillo.  Patient had been started on a Cardene drip as his blood pressure has been difficult to control.  His  blood glucose had also been very difficult to control due to steroid therapy.  Hospitalist service was consulted for assistance with management.    2.  Blood pressure is controlled at home with lisinopril and amlodipine.  These medications were continued and HCTZ was started.  It was also noted that he takes sotalol at home which was initially transitioned to carvedilol for better blood pressure control, but he has been placed back on sotalol due to tachyarrhythmia.  Systolic blood pressure remained 130-140 overnight.  He has not required any PRN antihypertensives in the last 24 hours.    3.  Patient did go into atrial fibrillation with rapid ventricular response on the morning of 6/19 after transitioning to carvedilol.  He was started on a Cardizem drip and carvedilol was transitioned back to sotalol.  Echocardiogram was ordered which is still in process.  Mag was 2.4, TSH 0.353.  He is currently on heparin at prophylactic dose only as he is status postcraniotomy.    4.  Blood sugar is well controlled at home with long-acting insulin 55 units in the morning and 65 units at night.  He has been receiving 55 units every 12 hours.  Will increase to 65 units every 12 hours as blood sugar has been running in the 300s.    5.  Anticipate discharge today after bowel movement.    Discharge Planning: I expect the patient to be discharged home today.    Electronically signed by ADITYA Shipman, 06/20/22, 09:02 GABRIELLAT.

## 2022-06-20 NOTE — THERAPY TREATMENT NOTE
Acute Care - Occupational Therapy Treatment Note  [facility]     Patient Name: Elijah Escobar  : 1955  MRN: [id number]  Today's Date: 2022             Admit Date: 2022       ICD-10-CM ICD-9-CM   1. Meningioma (HCC)  D32.9 225.2   2. Impaired mobility and ADLs  Z74.09 V49.89    Z78.9    3. Impaired mobility  Z74.09 799.89   4. S/P craniotomy  Z98.890 V45.89     Patient Active Problem List   Diagnosis   • Meningioma (HCC)   • Hypertension   • GERD (gastroesophageal reflux disease)   • Coronary artery disease   • Morbid obesity (HCC)   • Type 2 diabetes mellitus with hyperglycemia and peripheral neuropathy, with long-term current use of insulin (HCC)   • Leukocytosis   • Other specified anemias   • Paroxysmal atrial fibrillation with rapid ventricular response (HCC)     Past Medical History:   Diagnosis Date   • Brain tumor (HCC)    • Coronary artery disease    • COVID-19 vaccine series completed     MODERNA X 3; LAST DOSE 3/2022   • Diabetes (HCC)    • Erectile dysfunction    • GERD (gastroesophageal reflux disease)    • Hypercholesteremia    • Hypertension      Past Surgical History:   Procedure Laterality Date   • BALLOON ANGIOPLASTY, ARTERY Right    • COLONOSCOPY     • CRANIOTOMY FOR TUMOR Right 2022    Procedure: CRANIOTOMY FOR TUMOR STERIOTACTIC WITH BRAIN LAB, right;  Surgeon: Flaco Portillo MD;  Location: [facility];  Service: Neurosurgery;  Laterality: Right;         OT ASSESSMENT FLOWSHEET (last 12 hours)     OT Evaluation and Treatment     Row Name 22 1104                   OT Time and Intention    Subjective Information no complaints  -TS        Document Type therapy note (daily note)  -TS        Mode of Treatment occupational therapy  -TS        Patient Effort excellent  -TS        Comment okay for shower per Rahul Arnold  -TS                  General Information    Existing Precautions/Restrictions fall  -TS                  Cognition    Personal Safety Interventions  fall prevention program maintained;nonskid shoes/slippers when out of bed  -TS                  Activities of Daily Living    BADL Assessment/Intervention bathing;upper body dressing;lower body dressing  -TS                  Bathing Assessment/Intervention    [place] Level (Bathing) modified independence  -TS        Assistive Devices (Bathing) hand-held shower spray hose;grab bar, tub/shower;shower chair  -TS        Position (Bathing) unsupported sitting;supported standing  -TS                  Lower Body Dressing Assessment/Training    [place] Level (Lower Body Dressing) don;pants/bottoms;shoes/slippers;set up  -TS        Position (Lower Body Dressing) unsupported sitting;edge of bed sitting;unsupported standing  -TS                  Functional Mobility    Functional Mobility- Ind. Level independent  -TS                  Transfer Assessment/Treatment    Transfers sit-stand transfer;stand-sit transfer  -TS                  Transfers    Sit-Stand [place] (Transfers) independent  -TS        Stand-Sit [place] (Transfers) independent  -TS                  Wound 06/16/22 0951 Right anterior scalp Incision    Wound - Properties Group Placement Date: 06/16/22  -CHRISTIANO Placement Time: 0951 -CHRISTIANO Side: Right  -CHRISTIANO Orientation: anterior  -CHRISTIANO Location: scalp  -CHRISTIANO Primary Wound Type: Incision  -CHRISTIANO        Retired Wound - Properties Group Placement Date: 06/16/22  -CHRISTIANO Placement Time: 0951  -CHRISTIANO Side: Right  -CHRISTIANO Orientation: anterior  -CHRISTIANO Location: scalp  -CHRISTIANO Primary Wound Type: Incision  -CHRISTIANO        Retired Wound - Properties Group Date first assessed: 06/16/22  -CHRISTIANO Time first assessed: 0951 -CHRISTIANO Side: Right  -CHRISTIANO Location: scalp  -CHRISTIANO Primary Wound Type: Incision  -CHRISTIANO                  Plan of Care Review    Plan of Care Reviewed With patient  -TS        Progress improving  -TS        Outcome Evaluation Pt completed all ADL's and ambulation/transfers modified independent and independently. Pt discharging home with  family  -TS                  Positioning and Restraints    Pre-Treatment Position in bed  -TS        Post Treatment Position bed  -TS        In Bed sitting EOB;call light within reach;side rails up x2;with nsg  -TS                  OT Goals    Toileting Goal Selection (OT) toileting, OT goal 1  -TS                  Transfer Goal 1 (OT)    Activity/Assistive Device (Transfer Goal 1, OT) toilet  -TS        [place] Level/Cues Needed (Transfer Goal 1, OT) modified independence  -TS        Time Frame (Transfer Goal 1, OT) long term goal (LTG);10 days  -TS        Progress/Outcome (Transfer Goal 1, OT) goal met  -TS                  Bathing Goal 1 (OT)    Activity/Device (Bathing Goal 1, OT) bathing skills, all  -TS        [place] Level/Cues Needed (Bathing Goal 1, OT) modified independence  -TS        Time Frame (Bathing Goal 1, OT) long term goal (LTG);10 days  -TS        Progress/Outcomes (Bathing Goal 1, OT) goal met  -TS                  Dressing Goal 1 (OT)    Activity/Device (Dressing Goal 1, OT) other (see comments)  -TS        [place]/Cues Needed (Dressing Goal 1, OT) modified independence  -TS        Time Frame (Dressing Goal 1, OT) long term goal (LTG);10 days  -TS        Progress/Outcome (Dressing Goal 1, OT) goal met  -TS                  Toileting Goal 1 (OT)    Activity/Device (Toileting Goal 1, OT) toileting skills, all  -TS        [place] Level/Cues Needed (Toileting Goal 1, OT) modified independence  -TS        Time Frame (Toileting Goal 1, OT) long term goal (LTG);10 days  -TS        Progress/Outcome (Toileting Goal 1, OT) goal met  -TS              User Key  (r) = Recorded By, (t) = Taken By, (c) = Cosigned By    Initials Name Effective Dates    TS Roslyn Madrigal COTA 06/16/21 -     Ricky George, RN 02/17/22 -                  Occupational Therapy Education                 Title: PT OT SLP Therapies (Resolved)     Topic: Occupational Therapy (Resolved)     Point: ADL  training (Resolved)     Description:   Instruct learner(s) on proper safety adaptation and remediation techniques during self care or transfers.   Instruct in proper use of assistive devices.              Learning Progress Summary           Patient Acceptance, E, VU,NR by  at 6/17/2022 0929                   Point: Home exercise program (Resolved)     Description:   Instruct learner(s) on appropriate technique for monitoring, assisting and/or progressing therapeutic exercises/activities.              Learner Progress:  Not documented in this visit.          Point: Precautions (Resolved)     Description:   Instruct learner(s) on prescribed precautions during self-care and functional transfers.              Learning Progress Summary           Patient Acceptance, E, VU,NR by  at 6/17/2022 0929                   Point: Body mechanics (Resolved)     Description:   Instruct learner(s) on proper positioning and spine alignment during self-care, functional mobility activities and/or exercises.              Learning Progress Summary           Patient Acceptance, E, VU,NR by  at 6/17/2022 0929                               User Key     Initials Effective Dates Name Provider Type Discipline     06/07/22 -  Cary Jiang OT Occupational Therapist OT                  OT Recommendation and Plan     Plan of Care Review  Plan of Care Reviewed With: patient  Progress: improving  Outcome Evaluation: Pt completed all ADL's and ambulation/transfers modified independent and independently. Pt discharging home with family  Plan of Care Reviewed With: patient  Outcome Evaluation: Pt completed all ADL's and ambulation/transfers modified independent and independently. Pt discharging home with family     Outcome Measures     Row Name 06/20/22 1030 06/19/22 1402 06/18/22 0820       How much help from another person do you currently need...    Turning from your back to your side while in flat bed without using bedrails? 4  -KJ 4  -CHRISTIANO 4   -CHRISTIANO    Moving from lying on back to sitting on the side of a flat bed without bedrails? 4  -KJ 4  -CHRISTIANO 4  -CHRISTIANO    Moving to and from a bed to a chair (including a wheelchair)? 3  -KJ 3  -CHRISTIANO 3  -CHRISTIANO    Standing up from a chair using your arms (e.g., wheelchair, bedside chair)? 3  -KJ 3  -CHRISTIANO 3  -CHRISTIANO    Climbing 3-5 steps with a railing? 3  -KJ 3  -CHRISTIANO 3  -CHRISTIANO    To walk in hospital room? 3  -KJ 3  -CHRISTIANO 3  -CHRISTIANO    AM-PAC 6 Clicks Score (PT) 20  -KJ 20  -CHRISTIANO 20  -CHRISTIANO       Functional Assessment    Outcome Measure Options AM-PAC 6 Clicks Basic Mobility (PT)  -KJ AM-PAC 6 Clicks Basic Mobility (PT)  -CHRISTIANO AM-PAC 6 Clicks Basic Mobility (PT)  -CHRISTIANO          User Key  (r) = Recorded By, (t) = Taken By, (c) = Cosigned By    Initials Name Provider Type    Ivett Truong, KYLAH Physical Therapist Assistant    Александр Morley PTA Physical Therapist Assistant                Time Calculation:    Time Calculation- OT     Row Name 06/20/22 1311             Time Calculation- OT    OT Start Time 1104  -TS      OT Stop Time 1212  -TS      OT Time Calculation (min) 68 min  -TS      Total Timed Code Minutes- OT 68 minute(s)  -TS      OT Received On 06/20/22  -TS              Timed Charges    97990 - OT Self Care/Mgmt Minutes 68  -TS              Total Minutes    Timed Charges Total Minutes 68  -TS       Total Minutes 68  -TS            User Key  (r) = Recorded By, (t) = Taken By, (c) = Cosigned By    Initials Name Provider Type    TS Roslyn Madrigal COTA Occupational Therapist Assistant              Therapy Charges for Today     Code Description Service Date Service Provider Modifiers Qty    [id number] HC OT SELF CARE/MGMT/TRAIN EA 15 MIN 6/20/2022 Roslyn Madrigal COTA GO 5               Roslyn SILVA. AJ Madrigal  6/20/2022

## 2022-06-20 NOTE — THERAPY TREATMENT NOTE
Acute Care - Physical Therapy Treatment Note  [facility]     Patient Name: Elijah Escobar  : 1955  MRN: [id number]  Today's Date: 2022      Visit Dx:     ICD-10-CM ICD-9-CM   1. Meningioma (HCC)  D32.9 225.2   2. Impaired mobility and ADLs  Z74.09 V49.89    Z78.9    3. Impaired mobility  Z74.09 799.89   4. S/P craniotomy  Z98.890 V45.89     Patient Active Problem List   Diagnosis   • Meningioma (HCC)   • Hypertension   • GERD (gastroesophageal reflux disease)   • Coronary artery disease   • Morbid obesity (HCC)   • Type 2 diabetes mellitus with hyperglycemia and peripheral neuropathy, with long-term current use of insulin (HCC)   • Leukocytosis   • Other specified anemias   • Paroxysmal atrial fibrillation with rapid ventricular response (HCC)     Past Medical History:   Diagnosis Date   • Brain tumor (HCC)    • Coronary artery disease    • COVID-19 vaccine series completed     MODERNA X 3; LAST DOSE 3/2022   • Diabetes (HCC)    • Erectile dysfunction    • GERD (gastroesophageal reflux disease)    • Hypercholesteremia    • Hypertension      Past Surgical History:   Procedure Laterality Date   • BALLOON ANGIOPLASTY, ARTERY Right    • COLONOSCOPY     • CRANIOTOMY FOR TUMOR Right 2022    Procedure: CRANIOTOMY FOR TUMOR STERIOTACTIC WITH BRAIN LAB, right;  Surgeon: Flaco Portillo MD;  Location: [facility];  Service: Neurosurgery;  Laterality: Right;     PT Assessment (last 12 hours)     PT Evaluation and Treatment     Row Name 22 1030          Physical Therapy Time and Intention    Subjective Information no complaints  -KJ     Document Type therapy note (daily note)  -KJ     Mode of Treatment physical therapy  -KJ     Patient Effort good  -KJ     Row Name 22 1030          General Information    Existing Precautions/Restrictions fall  -KJ     Row Name 22 1030          Pain    Pretreatment Pain Rating 4/10  -KJ     Posttreatment Pain Rating 4/10  -KJ     Pain Location - head   -KJ     Row Name 06/20/22 1030          Bed Mobility    Supine-Sit [place] (Bed Mobility) independent  -KJ     Sit-Supine [place] (Bed Mobility) independent  -KJ     Row Name 06/20/22 1030          Transfers    Sit-Stand [place] (Transfers) verbal cues;supervision  -KJ     Stand-Sit [place] (Transfers) supervision  -KJ     Row Name 06/20/22 1030          Gait/Stairs (Locomotion)    [place] Level (Gait) standby assist  -KJ     Distance in Feet (Gait) 500  -KJ     Deviations/Abnormal Patterns (Gait) gait speed decreased;stride length decreased  -KJ     Row Name             Wound 06/16/22 0951 Right anterior scalp Incision    Wound - Properties Group Placement Date: 06/16/22  -CHRISTIANO Placement Time: 0951 -CHRISTIANO Side: Right  -CHRISTIANO Orientation: anterior  -CHRISTIANO Location: scalp  -CHRISTIANO Primary Wound Type: Incision  -CHRISTIANO     Retired Wound - Properties Group Placement Date: 06/16/22  -CHRISTIANO Placement Time: 0951 -CHRISTIANO Side: Right  -CHRISTIANO Orientation: anterior  -CHRISTIANO Location: scalp  -CHRISTIANO Primary Wound Type: Incision  -CHRISTIANO     Retired Wound - Properties Group Date first assessed: 06/16/22  -CHRISTIANO Time first assessed: 0951 -CHRISTIANO Side: Right  -CHRISTIANO Location: scalp  -CHRISTIANO Primary Wound Type: Incision  -CHRISTIANO     Row Name 06/20/22 1030          Positioning and Restraints    Pre-Treatment Position in bed  -KJ     Post Treatment Position bed  -KJ     In Bed call light within reach  -KJ           User Key  (r) = Recorded By, (t) = Taken By, (c) = Cosigned By    Initials Name Provider Type    Ivett Truong, PTA Physical Therapist Assistant    Ricky George RN Registered Nurse                Physical Therapy Education                 Title: PT OT SLP Therapies (In Progress)     Topic: Physical Therapy (In Progress)     Point: Mobility training (Done)     Learning Progress Summary           Patient Acceptance, E, VU by CHRISTIANO at 6/19/2022 1402    Comment: safety with amb    Acceptance, E, VU by CHRISTIANO at 6/18/2022 0820    Comment: benefits of  amb, safety    Acceptance, E, VU,DU by JUAN at 6/17/2022 0805    Comment: Pt presents to therapy s/p cranioctomy from meningioma. Pt was educated on benefits of ambulation to improve activity tolerance and strength. Anticipate d.c to home with assist when ready.                   Point: Home exercise program (Not Started)     Learner Progress:  Not documented in this visit.          Point: Body mechanics (Not Started)     Learner Progress:  Not documented in this visit.          Point: Precautions (Not Started)     Learner Progress:  Not documented in this visit.                      User Key     Initials Effective Dates Name Provider Type Discipline    CHRISTIANO 12/08/16 -  Александр Saravia PTA Physical Therapist Assistant PT    JUAN 05/04/22 -  Kemal Lin PT Student PT Student PT              PT Recommendation and Plan         Outcome Measures     Row Name 06/20/22 1030 06/19/22 1402 06/18/22 0820       How much help from another person do you currently need...    Turning from your back to your side while in flat bed without using bedrails? 4  -KJ 4  -CHRISTIANO 4  -CHRISTIANO    Moving from lying on back to sitting on the side of a flat bed without bedrails? 4  -KJ 4  -CHRISTIANO 4  -CHRISTIANO    Moving to and from a bed to a chair (including a wheelchair)? 3  -KJ 3  -CHRISTIANO 3  -CHRISTIANO    Standing up from a chair using your arms (e.g., wheelchair, bedside chair)? 3  -KJ 3  -CHRISTIANO 3  -CHRISTIANO    Climbing 3-5 steps with a railing? 3  -KJ 3  -CHRISTIANO 3  -CHRISTIANO    To walk in hospital room? 3  -KJ 3  -CHRISTIANO 3  -CHRISTIANO    AM-PAC 6 Clicks Score (PT) 20  -KJ 20  -CHRISTIANO 20  -CHRISTIANO       Functional Assessment    Outcome Measure Options AM-PAC 6 Clicks Basic Mobility (PT)  -KJ AM-PAC 6 Clicks Basic Mobility (PT)  -CHRISTIANO AM-PAC 6 Clicks Basic Mobility (PT)  -CHRISTIANO          User Key  (r) = Recorded By, (t) = Taken By, (c) = Cosigned By    Initials Name Provider Type    Ivett Truong, [place]LAH Physical Therapist Assistant    Александр Morley PTA Physical Therapist Assistant                 Time  Calculation:    PT Charges     Row Name 06/20/22 1049             Time Calculation    Start Time 1030  -KJ      Stop Time 1046  -KJ      Time Calculation (min) 16 min  -KJ      PT Received On 06/20/22  -KJ      PT Goal Re-Cert Due Date 06/27/22  -KJ              Time Calculation- PT    Total Timed Code Minutes- PT 16 minute(s)  -KJ            User Key  (r) = Recorded By, (t) = Taken By, (c) = Cosigned By    Initials Name Provider Type    Ivett Truong, [place]LAH Physical Therapist Assistant              Therapy Charges for Today     Code Description Service Date Service Provider Modifiers Qty    57406679244 HC GAIT TRAINING EA 15 MIN 6/20/2022 Ivett Yu PTA GP 1          PT G-Codes  Outcome Measure Options: AM-PAC 6 Clicks Basic Mobility (PT)  AM-PAC 6 Clicks Score (PT): 20  AM-PAC 6 Clicks Score (OT): 21    Ivett Yu PTA  6/20/2022

## 2022-06-20 NOTE — PROGRESS NOTES
NEUROSURGERY DAILY PROGRESS NOTE    ASSESSMENT:   Elijah Escobar is a 66 y.o. with a significant comorbidity of coronary artery disease, diabetes, erectile dysfunction, GERD, hypertension, hyperlipidemia. He presents with a new problem of 2 episodes of intermittent amnesia suggestive of seizures. Physical exam findings of left pronator drift and dysmetria with left finger-nose otherwise neurologically intact.  His imaging shows avidly contrast-enhancing meningeal based mass with dural tails measuring 2.8 x 2.8 x 2.0 cm with surrounding FLAIR signal.    Past Medical History:   Diagnosis Date   • Brain tumor (HCC)    • Coronary artery disease    • COVID-19 vaccine series completed     MODERNA X 3; LAST DOSE 3/2022   • Diabetes (HCC)    • Erectile dysfunction    • GERD (gastroesophageal reflux disease)    • Hypercholesteremia    • Hypertension      Active Hospital Problems    Diagnosis    • **Meningioma (HCC)    • Paroxysmal atrial fibrillation with rapid ventricular response (HCC)    • Hypertension    • GERD (gastroesophageal reflux disease)    • Coronary artery disease    • Morbid obesity (HCC)    • Type 2 diabetes mellitus with hyperglycemia and peripheral neuropathy, with long-term current use of insulin (HCC)    • Leukocytosis    • Other specified anemias      PLAN:   Neuro: Stable.  Bright and awake.  Oriented by 3.  Follows commands without prompting showing thumbs up and 2 fingers bilaterally.  No pronator drift or dysmetria.    4 Days Post-Op (6/16/2022) status post craniotomy for tumor   Pathology: Meningioma, meningothelial type (WHO grade I)   Postop CT head stable   JUAN removed   Unable to tolerate MRI.  We will cancel for now and attempt OP   Continue Keppra   Decadron taper   Neurochecks per policy.  Call for decline   DC to floor     CV: Continuous cardiac monitoring.  A-line DC'd.   Not requiring Cardene to keep SBP <140.  Hospitalist consulted.  Appreciate assist   A. kaylyn with RVR.  On Cardizem.   "  Pulm: Maintaining O2 sat.  Continuous pulse oximetry.  Incentive spirometry.   : Voiding spontaneously.  Bladder scans and I/O cath per policy.   FEN: Tolerating regular diet.     GI: No BM TD.  Add Dulcolax  ID: 23-hour postoperative prophylactic antibiotics complete    Heme:  DVT prophylaxis; SCD's  Pain: Tolerable at present with oral meds.     Dispo: PT/OT.      May shower today.   For discharge after BM.    CHIEF COMPLAINT:   Episodic seizures    Subjective  Symptom stable.  Doing well    Temp:  [97.8 °F (36.6 °C)-98.9 °F (37.2 °C)] 97.8 °F (36.6 °C)  Heart Rate:  [] 97  Resp:  [16-18] 18  BP: (108-156)/() 133/80    Objective:  General Appearance:  In no acute distress.    Vital signs: (most recent): Blood pressure 133/80, pulse 97, temperature 97.8 °F (36.6 °C), temperature source Oral, resp. rate 18, height 177.8 cm (70\"), weight 126 kg (278 lb 4.8 oz), SpO2 98 %.      Neurologic Exam     Mental Status   Oriented to person.   Disoriented to place.   Oriented to year.   Attention: normal. Concentration: normal.   Speech: speech is normal   Level of consciousness: alert    Bright and awake.  Oriented by 3.  Follows commands without prompting showing thumbs up and 2 fingers bilaterally.  No pronator drift or dysmetria.      Cranial Nerves     CN II   Visual fields full to confrontation.     CN III, IV, VI   Pupils are equal, round, and reactive to light.  Extraocular motions are normal.     CN V   Facial sensation intact.     CN VII   Facial expression full, symmetric.     CN VIII   CN VIII normal.     CN IX, X   CN IX normal.     CN XI   CN XI normal.     Motor Exam   Right arm tone: normal  Left arm tone: normal  Right leg tone: normal  Left leg tone: normal    Strength   Right deltoid: 5/5  Left deltoid: 5/5  Right biceps: 5/5  Left biceps: 5/5  Right triceps: 5/5  Left triceps: 5/5  Right wrist extension: 5/5  Left wrist extension: 5/5  Right iliopsoas: 5/5  Left iliopsoas: 5/5  Right " quadriceps: 5/5  Left quadriceps: 5/5  Right anterior tibial: 5/5  Left anterior tibial: 5/5  Right gastroc: 5/5  Left gastroc: 5/5  Right EHL 5/5  Left EHL 5/5       Sensory Exam   Right arm light touch: normal  Left arm light touch: normal  Right leg light touch: normal  Left leg light touch: normal    Gait, Coordination, and Reflexes     Tremor   Resting tremor: absent  Intention tremor: absent  Action tremor: absent    Incision: Clean, dry, intact    Drains:   Closed/Suction Drain 1 Scalp Bulb (Active)   Site Description Unable to view 06/16/22 1240   Dressing Status Clean;Dry;Intact 06/16/22 1240   Drainage Appearance Bloody 06/16/22 1240   Status To bulb suction 06/16/22 1240       Urethral Catheter Non-latex;Silicone 16 Fr. (Active)   Daily Indications Selected surgeries ( tract, abdomen) 06/16/22 1240   Site Assessment Clean;Skin intact 06/16/22 1233   Collection Container Standard drainage bag 06/16/22 1240   Securement Method Securing device 06/16/22 1240   Output (mL) 1100 mL 06/16/22 1233       Output by Drain (mL) 06/19/22 0701 - 06/19/22 1900 06/19/22 1901 - 06/20/22 0700 06/20/22 0701 - 06/20/22 0830 Range Total   Closed/Suction Drain 1 Scalp Bulb 5   5       Imaging Results (Last 24 Hours)     ** No results found for the last 24 hours. **        Lab Results (last 24 hours)     Procedure Component Value Units Date/Time    POC Glucose Once [[id number]]  (Abnormal) Collected: 06/20/22 0753    Specimen: Blood Updated: 06/20/22 0807     Glucose 290 mg/dL      Comment: : [id number] Michaela CampostaMeter ID: [id number]       Basic Metabolic Panel [[id number]]  (Abnormal) Collected: 06/20/22 0521    Specimen: Blood Updated: 06/20/22 0616     Glucose 312 mg/dL      BUN 25 mg/dL      Creatinine 0.89 mg/dL      Sodium 138 mmol/L      Potassium 4.1 mmol/L      Chloride 104 mmol/L      CO2 24.0 mmol/L      Calcium 8.5 mg/dL      BUN/Creatinine Ratio 28.1     Anion Gap 10.0 mmol/L      eGFR 94.5 mL/min/1.73       Comment: National Kidney Foundation and American Society of Nephrology (ASN) Task Force recommended calculation based on the Chronic Kidney Disease Epidemiology Collaboration (CKD-EPI) equation refit without adjustment for race.       Narrative:      GFR Normal >60  Chronic Kidney Disease <60  Kidney Failure <15      CBC & Differential [[id number]]  (Abnormal) Collected: 06/20/22 0521    Specimen: Blood Updated: 06/20/22 0555    Narrative:      The following orders were created for panel order CBC & Differential.  Procedure                               Abnormality         Status                     ---------                               -----------         ------                     CBC Auto Differential[[id number]]        Abnormal            Final result                 Please view results for these tests on the individual orders.    CBC Auto Differential [[id number]]  (Abnormal) Collected: 06/20/22 0521    Specimen: Blood Updated: 06/20/22 0555     WBC 16.60 10*3/mm3      RBC 3.80 10*6/mm3      Hemoglobin 12.1 g/dL      Hematocrit 36.1 %      MCV 95.0 fL      MCH 31.8 pg      MCHC 33.5 g/dL      RDW 12.2 %      RDW-SD 42.5 fl      MPV 11.1 fL      Platelets 208 10*3/mm3      Neutrophil % 87.3 %      Lymphocyte % 6.7 %      Monocyte % 5.0 %      Eosinophil % 0.0 %      Basophil % 0.1 %      Immature Grans % 0.9 %      Neutrophils, Absolute 14.50 10*3/mm3      Lymphocytes, Absolute 1.11 10*3/mm3      Monocytes, Absolute 0.83 10*3/mm3      Eosinophils, Absolute 0.00 10*3/mm3      Basophils, Absolute 0.01 10*3/mm3      Immature Grans, Absolute 0.15 10*3/mm3      nRBC 0.0 /100 WBC     POC Glucose Once [[id number]]  (Abnormal) Collected: 06/19/22 2117    Specimen: Blood Updated: 06/19/22 2128     Glucose 288 mg/dL      Comment: : [id number] Momin AbigailMeter ID: [id number]       POC Glucose Once [[id number]]  (Abnormal) Collected: 06/19/22 1708    Specimen: Blood Updated: 06/19/22 1719     Glucose 245 mg/dL       Comment: : [id number] Michaela DianaMeter ID: [id number]       POC Glucose Once [[id number]]  (Abnormal) Collected: 06/19/22 1111    Specimen: Blood Updated: 06/19/22 1123     Glucose 337 mg/dL      Comment: : [id number] Lori CoatesMeter ID: [id number]       TSH [[id number]]  (Normal) Collected: 06/19/22 0253    Specimen: Blood Updated: 06/19/22 1104     TSH 0.353 uIU/mL     Magnesium [[id number]]  (Normal) Collected: 06/19/22 0253    Specimen: Blood Updated: 06/19/22 1044     Magnesium 2.4 mg/dL     POC Glucose Once [[id number]]  (Abnormal) Collected: 06/19/22 0829    Specimen: Blood Updated: 06/19/22 0840     Glucose 312 mg/dL      Comment: : [id number] Antolin Cline ID: [id number]           [id number]  Rahul Arnold, APRN

## 2022-06-20 NOTE — NURSING NOTE
Monitor room called to notify patient Heart rate went up to 160 /m. Patient was using toilet at that time.ADITYA Gallego notified. Not to discharge home until hospitalist sees him per Rahul. Will notify Hospitalist.

## 2022-06-20 NOTE — THERAPY DISCHARGE NOTE
Acute Care - Physical Therapy Discharge Summary  [facility]       Patient Name: Elijah Escobar  : 1955  MRN: [id number]    Today's Date: 2022                 Admit Date: 2022      PT Recommendation and Plan    Visit Dx:    ICD-10-CM ICD-9-CM   1. Meningioma (HCC)  D32.9 225.2   2. Impaired mobility and ADLs  Z74.09 V49.89    Z78.9    3. Impaired mobility  Z74.09 799.89   4. S/P craniotomy  Z98.890 V45.89        Outcome Measures     Row Name 22 1030 22 1402 22 0820       How much help from another person do you currently need...    Turning from your back to your side while in flat bed without using bedrails? 4  -KJ 4  -CHRISTIANO 4  -CHRISTIANO    Moving from lying on back to sitting on the side of a flat bed without bedrails? 4  -KJ 4  -CHRISTIANO 4  -CHRISTIANO    Moving to and from a bed to a chair (including a wheelchair)? 3  -KJ 3  -CHRISTIANO 3  -CHRISTIANO    Standing up from a chair using your arms (e.g., wheelchair, bedside chair)? 3  -KJ 3  -CHRISTIANO 3  -CHRISTIANO    Climbing 3-5 steps with a railing? 3  -KJ 3  -CHRISTIANO 3  -CHRISTIANO    To walk in hospital room? 3  -KJ 3  -CHRISTIANO 3  -CHRISTIANO    AM-PAC 6 Clicks Score (PT) 20  -KJ 20  -CHRISTIANO 20  -CHRISTIANO       Functional Assessment    Outcome Measure Options AM-PAC 6 Clicks Basic Mobility (PT)  -KJ AM-PAC 6 Clicks Basic Mobility (PT)  -CHRISTIANO AM-PAC 6 Clicks Basic Mobility (PT)  -CHRISTIANO          User Key  (r) = Recorded By, (t) = Taken By, (c) = Cosigned By    Initials Name Provider Type    Ivett Truong PTA Physical Therapist Assistant    Александр Morley PTA Physical Therapist Assistant                 PT Charges     Row Name 22 1049             Time Calculation    Start Time 1030  -KJ      Stop Time 1046  -KJ      Time Calculation (min) 16 min  -KJ      PT Received On 22  -KJ      PT Goal Re-Cert Due Date 22  -KJ              Time Calculation- PT    Total Timed Code Minutes- PT 16 minute(s)  -KJ            User Key  (r) = Recorded By, (t) = Taken By, (c) = Cosigned By    Initials Name  Provider Type    Ivett Truong, PTA Physical Therapist Assistant                 PT Rehab Goals     Row Name 06/20/22 1320             Bed Mobility Goal 1 (PT)    Activity/Assistive Device (Bed Mobility Goal 1, PT) sit to supine;supine to sit  -AB      [place] Level/Cues Needed (Bed Mobility Goal 1, PT) independent  -AB      Time Frame (Bed Mobility Goal 1, PT) long term goal (LTG);10 days  -AB      Progress/Outcomes (Bed Mobility Goal 1, PT) goal met  -AB              Transfer Goal 1 (PT)    Activity/Assistive Device (Transfer Goal 1, PT) sit-to-stand/stand-to-sit;bed-to-chair/chair-to-bed  -AB      [place] Level/Cues Needed (Transfer Goal 1, PT) independent  -AB      Time Frame (Transfer Goal 1, PT) long term goal (LTG);10 days  -AB      Progress/Outcome (Transfer Goal 1, PT) goal met  -AB              Gait Training Goal 1 (PT)    Activity/Assistive Device (Gait Training Goal 1, PT) gait (walking locomotion);decrease fall risk;improve balance and speed;increase endurance/gait distance;increase energy conservation  -AB      [place] Level (Gait Training Goal 1, PT) independent  -AB      Distance (Gait Training Goal 1, PT) 300 ft  -AB      Time Frame (Gait Training Goal 1, PT) long term goal (LTG);10 days  -AB      Progress/Outcome (Gait Training Goal 1, PT) goal partially met  -AB              Stairs Goal 1 (PT)    Activity/Assistive Device (Stairs Goal 1, PT) ascending stairs;descending stairs  -AB      [place] Level/Cues Needed (Stairs Goal 1, PT) independent  -AB      Number of Stairs (Stairs Goal 1, PT) 3  -AB      Time Frame (Stairs Goal 1, PT) long term goal (LTG);10 days  -AB      Progress/Outcome (Stairs Goal 1, PT) goal not met  -AB            User Key  (r) = Recorded By, (t) = Taken By, (c) = Cosigned By    Initials Name Provider Type Discipline    Anayeli Calle, PTA Physical Therapist Assistant PT                    PT Discharge Summary  Anticipated Discharge Disposition  (PT): home with assist, home with 24/7 care, home with home health, home with supervision  Reason for Discharge: Discharge from facility  Outcomes Achieved: Refer to plan of care for updates on goals achieved  Discharge Destination: Home with assist      Anayeli Saez, PTA   6/20/2022

## 2022-06-20 NOTE — THERAPY TREATMENT NOTE
Acute Care - Physical Therapy Treatment Note  [facility]     Patient Name: Elijah Escobar  : 1955  MRN: [id number]  Today's Date: 2022      Visit Dx:     ICD-10-CM ICD-9-CM   1. Meningioma (HCC)  D32.9 225.2   2. Impaired mobility and ADLs  Z74.09 V49.89    Z78.9    3. Impaired mobility  Z74.09 799.89   4. S/P craniotomy  Z98.890 V45.89     Patient Active Problem List   Diagnosis   • Meningioma (HCC)   • Hypertension   • GERD (gastroesophageal reflux disease)   • Coronary artery disease   • Morbid obesity (HCC)   • Type 2 diabetes mellitus with hyperglycemia and peripheral neuropathy, with long-term current use of insulin (HCC)   • Leukocytosis   • Other specified anemias   • Paroxysmal atrial fibrillation with rapid ventricular response (HCC)     Past Medical History:   Diagnosis Date   • Brain tumor (HCC)    • Coronary artery disease    • COVID-19 vaccine series completed     MODERNA X 3; LAST DOSE 3/2022   • Diabetes (HCC)    • Erectile dysfunction    • GERD (gastroesophageal reflux disease)    • Hypercholesteremia    • Hypertension      Past Surgical History:   Procedure Laterality Date   • BALLOON ANGIOPLASTY, ARTERY Right    • COLONOSCOPY     • CRANIOTOMY FOR TUMOR Right 2022    Procedure: CRANIOTOMY FOR TUMOR STERIOTACTIC WITH BRAIN LAB, right;  Surgeon: Flaco Portillo MD;  Location: [facility];  Service: Neurosurgery;  Laterality: Right;     PT Assessment (last 12 hours)     PT Evaluation and Treatment     Row Name 22 1030          Physical Therapy Time and Intention    Subjective Information no complaints  -KJ     Document Type therapy note (daily note)  -KJ     Mode of Treatment physical therapy  -KJ     Patient Effort good  -KJ     Row Name 22 1030          General Information    Existing Precautions/Restrictions fall  -KJ     Row Name 22 1030          Pain    Pretreatment Pain Rating 4/10  -KJ     Posttreatment Pain Rating 4/10  -KJ     Pain Location - head   -KJ     Row Name 06/20/22 1030          Bed Mobility    Supine-Sit [place] (Bed Mobility) independent  -KJ     Sit-Supine [place] (Bed Mobility) independent  -KJ     Row Name 06/20/22 1030          Transfers    Sit-Stand [place] (Transfers) verbal cues;supervision  -KJ     Stand-Sit [place] (Transfers) supervision  -KJ     Row Name 06/20/22 1030          Gait/Stairs (Locomotion)    [place] Level (Gait) standby assist  -KJ     Distance in Feet (Gait) 500  -KJ     Deviations/Abnormal Patterns (Gait) gait speed decreased;stride length decreased  -KJ     Row Name             Wound 06/16/22 0951 Right anterior scalp Incision    Wound - Properties Group Placement Date: 06/16/22  -CHRISTIANO Placement Time: 0951 -CHRISTIANO Side: Right  -CHRISTIANO Orientation: anterior  -CHRISTIANO Location: scalp  -CHRISTIANO Primary Wound Type: Incision  -CHRISTIANO     Retired Wound - Properties Group Placement Date: 06/16/22  -CHRISTIANO Placement Time: 0951 -CHRISTIANO Side: Right  -CHRISTIANO Orientation: anterior  -CHRISTIANO Location: scalp  -CHRISTIANO Primary Wound Type: Incision  -CHRISTIANO     Retired Wound - Properties Group Date first assessed: 06/16/22  -CHRISTIANO Time first assessed: 0951 -CHRISTIANO Side: Right  -CHRISTIANO Location: scalp  -CHRISTIANO Primary Wound Type: Incision  -CHRISTIANO     Row Name 06/20/22 1030          Positioning and Restraints    Pre-Treatment Position in bed  -KJ     Post Treatment Position bed  -KJ     In Bed call light within reach  -KJ           User Key  (r) = Recorded By, (t) = Taken By, (c) = Cosigned By    Initials Name Provider Type    Ivett Truong, PTA Physical Therapist Assistant    Ricky George RN Registered Nurse                Physical Therapy Education                 Title: PT OT SLP Therapies (In Progress)     Topic: Physical Therapy (In Progress)     Point: Mobility training (Done)     Learning Progress Summary           Patient Acceptance, E, VU by CHRISTIANO at 6/19/2022 1402    Comment: safety with amb    Acceptance, E, VU by CHRISTIANO at 6/18/2022 0820    Comment: benefits of  amb, safety    Acceptance, E, VU,DU by JUAN at 6/17/2022 0805    Comment: Pt presents to therapy s/p cranioctomy from meningioma. Pt was educated on benefits of ambulation to improve activity tolerance and strength. Anticipate d.c to home with assist when ready.                   Point: Home exercise program (Not Started)     Learner Progress:  Not documented in this visit.          Point: Body mechanics (Not Started)     Learner Progress:  Not documented in this visit.          Point: Precautions (Not Started)     Learner Progress:  Not documented in this visit.                      User Key     Initials Effective Dates Name Provider Type Discipline    CHRISTIANO 12/08/16 -  Александр Saravia PTA Physical Therapist Assistant PT    JUAN 05/04/22 -  Kemal Lin PT Student PT Student PT              PT Recommendation and Plan         Outcome Measures     Row Name 06/20/22 1030 06/19/22 1402 06/18/22 0820       How much help from another person do you currently need...    Turning from your back to your side while in flat bed without using bedrails? 4  -KJ 4  -CHRISTIANO 4  -CHRISTIANO    Moving from lying on back to sitting on the side of a flat bed without bedrails? 4  -KJ 4  -CHRISTIANO 4  -CHRISTIANO    Moving to and from a bed to a chair (including a wheelchair)? 3  -KJ 3  -CHRISTIANO 3  -CHRISTIANO    Standing up from a chair using your arms (e.g., wheelchair, bedside chair)? 3  -KJ 3  -CHRISTIANO 3  -CHRISTIANO    Climbing 3-5 steps with a railing? 3  -KJ 3  -CHRISTIANO 3  -CHRISTIANO    To walk in hospital room? 3  -KJ 3  -CHRISTIANO 3  -CHRISTIANO    AM-PAC 6 Clicks Score (PT) 20  -KJ 20  -CHRISTIANO 20  -CHRISTIANO       Functional Assessment    Outcome Measure Options AM-PAC 6 Clicks Basic Mobility (PT)  -KJ AM-PAC 6 Clicks Basic Mobility (PT)  -CHRISTIANO AM-PAC 6 Clicks Basic Mobility (PT)  -CHRISTIANO          User Key  (r) = Recorded By, (t) = Taken By, (c) = Cosigned By    Initials Name Provider Type    Ivett Truong, KYLAH Physical Therapist Assistant    Александр Morley PTA Physical Therapist Assistant                 Time  Calculation:    PT Charges     Row Name 06/20/22 1049             Time Calculation    Start Time 1030  -KJ      Stop Time 1046  -KJ      Time Calculation (min) 16 min  -KJ      PT Received On 06/20/22  -KJ      PT Goal Re-Cert Due Date 06/27/22  -KJ              Time Calculation- PT    Total Timed Code Minutes- PT 16 minute(s)  -KJ            User Key  (r) = Recorded By, (t) = Taken By, (c) = Cosigned By    Initials Name Provider Type    Ivett Truong, KYLAH Physical Therapist Assistant              Therapy Charges for Today     Code Description Service Date Service Provider Modifiers Qty    [id number] HC GAIT TRAINING EA 15 MIN 6/20/2022 Ivett Yu PTA GP 1          PT G-Codes  Outcome Measure Options: AM-PAC 6 Clicks Basic Mobility (PT)  AM-PAC 6 Clicks Score (PT): 20  AM-PAC 6 Clicks Score (OT): 21    Ivett Yu PTA  6/20/2022

## 2022-06-20 NOTE — PLAN OF CARE
Goal Outcome Evaluation:  Plan of Care Reviewed With: patient        Progress: no change  Outcome Evaluation: Pt A&Ox4, VSS. Reports of HA at times but reports relief from PRN meds. Up with stand by assist to bathroom, pt voiding.  equal. DENISE. No reports of N/T. Cardizem gtt stopped per MD order, HR controlled, running a fib on telemetry. Incision to head CDI. Pt expresses interest in getting up and taking a shower today. Safety maintained, will continue to monitor.

## 2022-06-20 NOTE — DISCHARGE INSTRUCTIONS
[facility]    Postoperative care following brain surgery  Dear Patient,  You have recently undergone brain surgery (craniotomy) and are now ready to go home. These written instructions are intended to help you to recover quickly.  If you have ANY QUESTIONS about your condition prior to discharge please ask Dr. Portillo. In particular, if you have concerns about going home discuss them now. We do not want you to go until you are completely comfortable leaving the hospital.   If you have ANY QUESTIONS about your condition after you go home call your doctor. The number is [phone] which is answered 24 hours a day. During regular working hours a  will connect you to your doctor, one of his partners, or one of our nurses. At night or on weekends the answering service will connect you with the physicianon call. DO NOT HESITATE to call. We want to help you with any problems.     Seizures  Anyone who has brain surgery has a small risk of seizures postoperatively. Seizures may involve involuntary shaking of the arms and legs, loss of consciousness, loss of urinary or bowel control. They typically last a few minutes, then the patient returns to normal. Seizures are frightening to watch, but usually resolve without long-term problems. Your doctor will often attempt to prevent seizures after surgery by putting you on anti-seizure medicine like Dilantin or Keppra. Sometimes seizures occur even when these medicines are used  What to do if a seizure occurs:  If a seizure occurs and the patient does not return to normal in 10 minutes, call your Dr. Portillo or go to the local emergency room.   If multiple seizures occur, call 911.     Neurological Deficit  Neurological deficits are problems with brain function like speech difficulty, weakness, numbness, imbalance, etc. These deficits may be present before or after brain surgery. Prior to discharge Dr. Portillo will make sure that all treatment  needed to help you recover from such deficits has been instituted. He will also make sure that these deficits are stable or improving. After you go home, if you think any of your brain problems are getting worse, not better, it may be a sign of bleeding, infection, or other problems. Call Dr. Portillo. He will order tests and prescribe treatment as needed.    Deep vein thrombosis/ pulmonary embolus  Some patients who undergo surgery develop blood clots in the veins of the legs. These clots can cause pain or swelling in the legs, or may cause no obvious problem. They can break free from the legs and travel to the lungs causing shortness of breath and/or chest pain. If you develop pain or swelling in your legs after surgery, call your doctor. If you develop breathing problems or chest pain after surgery, call 911.    Medication  It is important to take your medication EXACTLY as prescribed. Some patients are reluctant to take pain medication. It is perfectly fine to take pain medication for several weeks after surgery. We want to eliminate pain whenever possible. Many pain medications can cause nausea (sick to your stomach), constipation (inability to poop), or itching. Nausea may be minimized by taking the medication with food. Constipation can be relieved by taking stool softeners and/ or laxatives that you can purchase over the counter as needed. Some medications, like steroids or seizure medications, should not be stopped abruptly. They need to be weaned off over time. For instructions on weaning schedules call your doctor. Sometimes patients develop an allergy to a medication that was started during hospitalization. Most frequently an allergy will cause an itchy rash. Call your doctor if you think you might be having an allergic reaction.    You have undergone a relatively minor surgery which you are expected to recover from rapidly.  Examples of these surgeries include     - craniotomies  - bur holes for  subdural hematomas  - scalp lesions or laceration repair  - peripheral nerve surgery such as carpal tunnel release or cubital tunnel release  - kyphoplasty    Use of opioids immediately following surgery is often necessary.  Pain after surgery results in decreased quality of life, surgical complications, and prolonged rehabilitation.  Thus in certain situations, the benefits of a limited course of opioids may outweigh the risk.      However, multiple studies have found that patients of all ages frequently take fewer opioid pills than the amount prescribed after common surgeries.  In some cases they do not take any of the prescribed medications at all.  This results in excess opioid pills that are accessible to others, raise a concern for misuse, can be stolen by family members, can result in later addiction, or can often be used by overdose.  Therefore we are going to make every effort to only prescribe as much pain medication as necessary to limit the amount of pills that are left over after you recover.      First-line treatment should include nonopioid analgesics.  Examples of these would be Tylenol or nonsteroidal anti-inflammatories such as ibuprofen or Aleve.  - Tylenol 1000 mg every six hours as needed  - Naproxen Sodium (Aleve) 250 mg twice a day.     Second-line treatment. If these are insufficient to control your pain you have also been provided a small dose of opioids.  In order to avoid addiction you should take the lowest amount possible.    You will be provided with a 3-day or less supply of opioid pain medication.    Please taper your pain medications to avoid long term addiction.  Week 1: Take your pain medication no more than ever 6 hours as needed for severe pain.  By Week 2 you should be off of all opioid pain medication.     Wound Care  Your incision is held together with either staples that need to be removed in 2 weeks or dissolvable sutures that do not need to be removed.     Seventy-two hours  after surgery it is OK to get the wound wet, so you can take a shower or bath.     You do not need to put any medication (like Neosporin or Vitamin E) on the wound. Scrubbing the wound should be avoided until the staples nondissolvable sutures come out.     No nicotine products, including second-hand smoke, gum or patches. Nicotine will delay healing and increase the likelihood of a surgical complication. For help quitting, call the Quitline: 1-570.560.6016     Potential wound problems include the following:  Infection--If the wound becomes red, tender, swollen, or warm it may be infected. Infection is often accompanied by fever. If you think your wound might be infected you should call your doctor. Often you can send us a picture of the wound so we can better evaluate it.   Drainage--Fluid should not drain from your wound. If it does, call your doctor. Colored fluid may indicate infection. Clear fluid may indicate leakage of spinal fluid.   Dehiscence--If the wound does not heal properly it may open up along the staple line. This is called dehiscence. Call us immediately.   Sutures--Occasionally, one of the buried threads (sutures) may work through the skin. If you think this has happened call your doctor.   Swelling--Spinal fluid or blood may collect under the skin. This is usually harmless, but needs to be evaluated. Call your doctor.     Hydrocephalus  Your brain manufactures about one quart of clear fluid (called cerebrospinal fluid or CSF) every day. Normally this fluid is reabsorbed into the blood stream. After brain surgery the flow of fluid and the reabsorption process may be impaired. This leads to a buildup of water on the brain that is called hydrocephalus. Hydrocephalus can cause headache, somnolence, difficulty thinking, imbalance and loss of urinary control. If you think that the patient may have hydrocephalus, call your doctor. She/He will order a brain scan. If it shows water buildup a simple  operation called a shunt may be needed to fix the problem.    How to contact your doctor  The neurosurgeon, Dr. Portillo, and her/his team did your surgery and, therefore, are likely to know more about your condition than any other physicians. We are immediately available to help you with any problems after surgery. Please call us for any concerns at the following numbers:   Doctor’s office [phone] (answered 24 hours a day)   [facility]'s  [phone] (alternative emergency number for on-call neurosurgeon)     Specific instructions related to your surgery  Diet: no restrictions, eat a heart healthy diet.   Activity: as tolerated, no heavy lifting or strenuous exercise for at least 2 weeks.  ?  Keppra (levetiracetam): Keppra is an antiepileptic drug, also called an anticonvulsant. It is given to you because you either had a seizure or your condition makes you prone to have seizures. Do not stop this drug suddenly. You will need to be tapered off this medication. Report any new mood or behavior problems to your physician. These include depression, anxiety, agitation, irritability, hyperactivity, or suicidal thoughts.   ?  Decadron (dexamethasone): You are being prescribed a steroid to reduce brain swelling called decadron. Please take Pepcid or Zantac while on decadron to prevent gastric irritation and a possible bleeding stomach ulcer. Decadron can also cause your blood glucose (blood sugar) to be elevated. If you normally have problems with high blood glucose or diabetes than please continue to check your levels regularly or follow up with your primary care physician. Decadron can cause rapid weight gain, muscle loss or weakness, depression, and pancreatitis (acute onset of severe stomach pain with nausea and vomitting).     You have been placed on a steroid taper. This medication is intended to help alleviate swelling within your brain. It is important that you take the medication as  prescribed in the taper. While you are on steroid medications it is important that you are on an anti-acid (e.g. pepcid) to prevent a GI ulcer. Steroids tend to raise your blood sugar levels. Therefore, you should follow-up with your primary care physician within one week following discharge to ensure that your glucose levels are not elevated to a harmful extent.    Follow up:   You should follow up with Rahul BURGESS in 2 weeks for post op wound check and with Dr. Portillo in the neurosurgery clinic ([phone]) in 4 weeks.  MRI brain with and without contrast prior to arrival.

## 2022-06-21 NOTE — PAYOR COMM NOTE
"DC HOME 6-20-22  [id number]    Virgil Escobar (66 y.o. Male)             Date of Birth   1955    Social Security Number       Address   [address] [place] [id number]    Home Phone       MRN   [id number]       Oriental orthodox   Non-Mandaen    Marital Status                               Admission Date   6/16/22    Admission Type   Elective    Admitting Provider   Flaco Portillo MD    Attending Provider       Department, Room/Bed   [facility] 3A, 335/1       Discharge Date   6/20/2022    Discharge Disposition   Home or Self Care    Discharge Destination                               Attending Provider: (none)   Allergies: No Known Allergies    Isolation: None   Infection: None   Code Status: Prior   Advance Care Planning Activity    Ht: 177.8 cm (70\")   Wt: 126 kg (278 lb 4.8 oz)    Admission Cmt: None   Principal Problem: Meningioma (HCC) [D32.9]                 Active Insurance as of 6/16/2022     Primary Coverage     Payor Plan Insurance Group Employer/Plan Group    ANTHEM MEDICARE REPLACEMENT ANTH MEDICARE ADVANTAGE [id number]     Payor Plan Address Payor Plan Phone Number Payor Plan Fax Number Effective Dates    [address] [phone]  1/1/2022 - None Entered    [facility] [id number]       Subscriber Name Subscriber Birth Date Member ID       VIRGIL ESCOBAR 1955 [id number]                 Emergency Contacts      (Rel.) Home Phone Work Phone Mobile Phone    MAYTE ESCOBAR (Spouse) [phone] -- --    david lua (Sister) [phone] -- --    Manju Lua (Relative) -- -- [phone]               Operative/Procedure Notes (all)      Flaco Portillo MD at 06/16/22 0936  Version 1 of 1         CRANIOTOMY FOR TUMOR STERIOTACTIC WITH BRAIN LAB  Progress Note    Virgil NANCY Luz  6/16/2022    Pre-op Diagnosis:   Meningioma (HCC) [D32.9]       Post-Op Diagnosis Codes:     * Meningioma (HCC) [D32.9]    Procedure/CPT® " Codes:        Procedure(s):  CRANIOTOMY FOR TUMOR STERIOTACTIC WITH BRAIN LAB, right    Surgeon(s):  Flaco Portillo MD    Anesthesia: General    Staff:   Circulator: Adrian Petty, SELENA; Ricky Sams RN; Nora Florez RN  Scrub Person: Fifi Wooten; Millie Ureña  Vendor Representative: Jason Sarmiento  Assistant: Marianna Samano  Assistant: Marianna Samano      Estimated Blood Loss: 200ml    Urine Voided: 975 mL    Specimens:                Specimens     ID Source Type Tests Collected By Collected At Frozen?    A Brain Tissue · TISSUE PATHOLOGY EXAM  · SURGICAL PATHOLOGY EXAM   Flaco Portillo MD 6/16/22 1102 No    Description: BRAIN TUMOR FOR PERMANENT                Drains:   Closed/Suction Drain 1 Scalp Bulb (Active)   Site Description Unable to view 06/16/22 1405   Dressing Status Clean;Dry;Intact 06/16/22 1405   Drainage Appearance Bloody 06/16/22 1405   Status To bulb suction 06/16/22 1405   Output (mL) 45 mL 06/16/22 1508       Urethral Catheter Non-latex;Silicone 16 Fr. (Active)   Daily Indications Selected surgeries ( tract, abdomen) 06/16/22 1339   Site Assessment Clean;Skin intact 06/16/22 1339   Collection Container Standard drainage bag 06/16/22 1339   Securement Method Securing device 06/16/22 1339   Output (mL) 1100 mL 06/16/22 1233       Findings: Complete resection        Complications: None    Assistant: Marianna Samano  was responsible for performing the following activities: Retraction, Suction and Irrigation and their skilled assistance was necessary for the success of this case.    Flaco Portillo MD     Date: 6/16/2022  Time: 16:16 CDT        Electronically signed by Flaco Portillo MD at 06/16/22 1616     Flaco Portillo MD at 06/16/22 0936  Version 1 of 1         NEUROSURGERY OPERATIVE NOTE    Elijah Escobar  OR Date: 6/17/2022    Pre-op Diagnosis:   Meningioma (HCC) [D32.9]    Post-op Diagnosis:     Post-Op Diagnosis Codes:     * Meningioma  ([facility]) [D32.9]          Surgeon(s):  Flaco Portillo MD    Anesthesia: General    Staff:   Circulator: Adrian Petty, SELENA; Ricky Sams RN; Nora Florez RN  Scrub Person: Fifi Wooten; Millie Ureña  Vendor Representative: Jason Sarmiento  Assistant: Marianna Samano    Procedure(s):  CRANIOTOMY FOR TUMOR STERIOTACTIC WITH BRAIN LAB, right    INDICATIONS FOR PROCEDURE:   Elijah Escobar is a 66 y.o. male with a significant comorbidity of coronary artery disease, diabetes, erectile dysfunction, GERD, hypertension, hyperlipidemia. He presents with a new problem of 2 episodes of intermittent amnesia suggestive of seizures. Physical exam findings of left pronator drift and dysmetria with left finger-nose and MMSE 25/30, otherwise neurologically intact.  His imaging shows avidly contrast-enhancing meningeal based mass with dural tails measuring 2.8 x 2.8 x 2.0 cm with surrounding FLAIR signal.    We discussed the risks of a  bifrontal craniotomy with stereotactic localization, including but not limited to infection, bleeding, damage to local structures, CSF leak, seizures, hydrocephalus, weakness, numbness, paralysis, or loss of bowel and bladder function, stroke, coma, MI, or death.    DESCRIPTION OF OPERATION AND FINDINGS:   On the day of surgery, he was brought to the preoperative holding area where IV access was obtained. Prophylactic intravenous antibiotics were administered. He was then brought to the major operative suite. While on the [facility] [place], the patient underwent an uneventful induction of general anesthetic with placement of endotracheal tube, TEDs, SCD hoses, and a Jeong catheter were applied.      The patient was placed in the supine position on a standard operating table.  All pressure points were inspected and appropriately padded, and he was secured to the table.  The head was turned slightly to the left. And the patient was placed in a Jo 3-point fixation head carter to at least  75lbs.      The BrainLab frame was then attached to the [place] head cartre and secured.  Surface mapping was used to co-register the patient with the fused MRI image.  Accuracy was confirmed by checking the external auditory canal, medial, and lateral canthus of the eye.      The hair was then clipped along the incision line. The entire scalp was prepped with alcohol, Betadine, and DuraPrep and allowed to dry for 3 minutes.  He was then draped in the usual fashion.  At this point a WHO surgical timeout was performed with all members of the surgical team.      The skin was infiltrated with quarter percent Marcaine with epinephrine.  A bicoronal skin incision was made with a #15 scalpel.  Skin incision and Bovie cautery were then used to go through the galea but stopped just shy of the cranial periosteum.  Cintia clips were then placed along the skin.  The flap was then mobilized in a subgaleal plane anteriorly and posteriorly.  This was done until we encountered a fat pad overlying the temporalis muscle on the right.  To prevent destruction of the facial nerve the temporalis fascia and muscle was then incised.  Next care was taken to lift the periosteum taking care not to perforate it.  [place] tissue retractors were used keep the flap  and the lap was placed underneath the frontal scalp to avoid pressure on the eyes or necrosis of the flap.  Wet lap was then placed over the periosteum.  Next BrainLab was brought back into the field and a craniotomy flap was planned just slightly larger than the tumor.    8 stephanie holes were made with a craniotome and connected using a footplate.  Due to the tumors close approximation, 5 mm, of the superior sagittal sinus we decided to cross the superior sagittal sinus.  This was done by placing bur holes on either side of the superior sagittal sinus and then undermining with a dural separator until this was free.  The dural sinus was then held out of the way as a footplate  was used to cross the superior sagittal sinus.  The bone flap was placed aside and soaked in irrigation.  The dura was then opened with a 15 blade and Metzenbaum scissors.  This was then folded towards the midline.  And it was tacked in place with Nurolon and care was taken not to over tighten against the superior sagittal sinus.      The mass consistent with meningioma was found originating from the dura.  Gentle traction was used and cottonoids were placed on the outside of the tumor in a circumferential manner.  This was sequentially done until the tumor was fully free.  Once it could be fully elevated then a 15 blade and Metzenbaums were used to disconnect the tumor from the superior sagittal sinus.  A small amount of bleeding was seen from the dura.  This was easily controlled with bipolar coagulation.  Gross total resection of the tumor occurred.  The edges of the dura were then bipolared.    The operating microscope we then confirmed complete gross total resection.      The cavity was then filled with FloSeal or Avitene for 5 minutes then gently irrigated multiple times with irrigation. Once meticulous hemostasis was obtained the patient's blood pressure was elevated for 5 minutes to ensure hemostasis.  The surgical cavity was then lined with Surgicel.   DuraGen was laid over the dural defect. And epidural tack up sutures were placed.  A 7 Tongan flat JUAN was placed in the epidural space and tunneled backwards.    The bone flap was then replaced and secured with the Asael titanium plate system.  Again the extracranial space was thoroughly irrigated with 1 L of irrigation.  Next a second 7 Tongan flat JUAN was placed in the subgaleal space and tunneled posteriorly.  The muscle fascia galea was closed with interrupted 2-0 Vicryl sutures.  4-0 Monocryl was used for skin closure.  Incision was dressed with Xeroform and the patient's head was wrapped..    The patient was then removed from the [place] head caretr.   All pin sites were inspected for bleeding.  The patient was awoken and found to be at his neurological baseline.    There were no observed complications. The needle, sponge, and cottonoid counts were correct.        Estimated Blood Loss: 200ml    Complications: None    Implants:   Implant Name Type Inv. Item Serial No.  Lot No. LRB No. Used Action   CLIPAPPLR SCLP HEMO PRELD 1P/U STRL - [id number] Implant CLIPAPPLR SCLP HEMO PRELD 1P/U STRL  [employer] J [employer] J  Right 1 Implanted   HEMOST ABS SURGICEL 4X8IN - [id number] Implant HEMOST ABS SURGICEL 4X8IN  [employer]  DIV OF J AND J [id number] Right 1 Implanted   HEMOST ABS SURGIFOAM  8X12 10MM - [id number] Implant HEMOST ABS SURGIFOAM  8X12 10MM  [employer]  DIV OF J AND J [id number] Right 2 Implanted   KT HEMOST ABS SURGIFOAM PORCN 1GRAM - [id number] Implant KT HEMOST ABS SURGIFOAM PORCN 1GRAM  [employer]  DIV OF J AND J [id number] Right 2 Implanted   CLIPAPPLR SCLP HEMO PRELD 1P/U STRL - [id number] Implant CLIPAPPLR SCLP HEMO PRELD 1P/U STRL  [employer] DIV [employer] J AND J [id number] Right 1 Wasted   DURALMATRIX DURAGEN PLS 3X3IN EA/5 - [id number] Implant DURALMATRIX DURAGEN PLS 3X3IN EA/5  [employer] [id number] Right 1 Implanted   PLT BURHL LEFORTE PRE/CONTRD TI 6HL 22X0.6MM SLVR - [id number] Implant PLT BURHL LEFORTE PRE/CONTRD TI 6HL 22X0.6MM SLVR  [employer]  Right 3 Implanted   PLT BURHL LEFORTE PRE/CONTRD TI 6HL 15X0.6MM SLVR - [id number] Implant PLT BURHL LEFORTE PRE/CONTRD TI 6HL 15X0.6MM SLVR  [employer]  Right 2 Implanted   PLT BURHL LEFORTE PRE/CONTRD TI 5HL 0.3X15MM GRN - [id number] Implant PLT BURHL LEFORTE PRE/CONTRD TI 5HL 0.3X15MM GRN  [employer]  Right 1 Implanted   SCRW PLT NEURO LEFORTE L/P SLF/DRL 1.4X3.7MM SLVR - [id number] Implant SCRW PLT NEURO LEFORTE L/P SLF/DRL 1.4X3.7MM SLVR  [employer]  Right 26 Implanted       Specimens:                Specimens     ID Source Type Tests Collected By Collected At Frozen?    A Brain  Tissue · TISSUE PATHOLOGY EXAM  · SURGICAL PATHOLOGY EXAM   Flaco Portillo MD 6/16/22 1102 No    Description: BRAIN TUMOR FOR PERMANENT            Drains:   Closed/Suction Drain 1 Scalp Bulb (Active)   Site Description Unable to view 06/17/22 0400   Dressing Status Clean;Dry;Intact 06/17/22 0400   Drainage Appearance Serosanguineous 06/17/22 0400   Status To bulb suction 06/17/22 0400   Output (mL) 45 mL 06/17/22 0914       [REMOVED] Urethral Catheter Non-latex;Silicone 16 Fr. (Removed)   Daily Indications Selected surgeries ( tract, abdomen) 06/16/22 1339   Site Assessment Clean;Skin intact 06/16/22 1339   Collection Container Standard drainage bag 06/16/22 1339   Securement Method Securing device 06/16/22 1339   Output (mL) 1100 mL 06/16/22 1233           Electronically signed by Flaco Portillo MD at 06/17/22 1006          Discharge Summary      Rahul Arnold APRN at 06/20/22 1246          DISCHARGE SUMMARY FROM HOSPITAL    Date of Discharge:  6/20/2022    Presenting Diagnosis/History of Present Illness  Meningioma (HCC) [D32.9]    Medical History   Patient Active Problem List   Diagnosis   • Meningioma (HCC)   • Hypertension   • GERD (gastroesophageal reflux disease)   • Coronary artery disease   • Morbid obesity (HCC)   • Type 2 diabetes mellitus with hyperglycemia and peripheral neuropathy, with long-term current use of insulin (HCC)   • Leukocytosis   • Other specified anemias   • Paroxysmal atrial fibrillation with rapid ventricular response (HCC)     Discharge Diagnosis/ Active Hospital Problems:   Active Hospital Problems    Diagnosis    • **Meningioma (HCC)    • Paroxysmal atrial fibrillation with rapid ventricular response (HCC)    • Hypertension    • GERD (gastroesophageal reflux disease)    • Coronary artery disease    • Morbid obesity (HCC)    • Type 2 diabetes mellitus with hyperglycemia and peripheral neuropathy, with long-term current use of insulin (HCC)    • Leukocytosis    •  Other specified anemias      Hospital Course  Patient is a 66 y.o. male presented with a significant medical history of coronary artery disease, diabetes, erectile dysfunction, GERD, hypertension, hyperlipidemia. He presents with a new problem of 2 episodes of intermittent amnesia suggestive of seizures. Physical exam findings of left pronator drift and dysmetria with left finger-nose otherwise neurologically intact.  His imaging shows avidly contrast-enhancing meningeal based mass with dural tails measuring 2.8 x 2.8 x 2.0 cm with surrounding FLAIR signal.      On 6/16/2022 the patient was brought to the main operating room where he underwent an uneventful craniotomy for tumor removal.  Postoperatively he  did extremely well and his neurological exam remained unchanged.  He was kept in the hospital for close neurologic monitoring, airway observation, and for pain control.  His JUAN drain has been removed.  He has been able to ambulate, tolerate oral diet, oral pain medications and void.   He has been evaluated by physical therapy and occupational therapy and deemed safe for discharge home with family.   Postoperative education was performed at bedside which included wound care instructions, maximal physical activity, use of postoperative pain medication including tapering, and indications for contacting the neurosurgical office vs the emergency department.  Arrangements for postoperative follow-up should be provided prior to hospital discharge.  He will be provided with an appropriate course of oral medication for pain control, continuation of Keppra, and a titrating dose of Decadron.  Additional information provided in hospital discharge instructions.    Procedures Performed  Procedure(s):  CRANIOTOMY FOR TUMOR STERIOTACTIC WITH BRAIN LAB, right     Consults:   Consults     Date and Time Order Name Status Description    6/17/2022  8:39 AM Inpatient Hospitalist Consult Completed         Pertinent Test Results:   CT  Head Without Contrast    Result Date: 6/16/2022  1. Status post craniotomy on the right with resection of a tumor from the frontal dural space. A subdural drain is located in this area. There is air in the subdural space. There is minimal hemorrhage in the subdural space. 2. Vasogenic edema in the right frontal white matter remains stable. There is continued mild mass effect with sulcal effacement and mild compression of the right lateral ventricle.  This report was finalized on 06/16/2022 17:43 by Dr. Arian Stauffer MD.    MRI Brain With & Without Contrast    Result Date: 5/26/2022  1. Similar appearing 2.8 x 2.5 x 2.3 cm homogeneously enhancing extra-axial lesion along the vertex of the right frontal lobe with underlying reactive gliosis and effacement of the frontal horn right lateral ventricle most consistent with meningioma. Similar when comparing to [facility] MRI brain exam 3/25/2022.  This report was finalized on 05/26/2022 20:43 by Dr. Coleen Terry MD.    XR Chest 1 View    Result Date: 5/26/2022   No acute findings. This report was finalized on 05/26/2022 15:42 by Dr. Mk Chinchilla MD.    CBC:   Results from last 7 days   Lab Units 06/20/22  0521 06/19/22  0253 06/18/22  0517 06/17/22  0249   WBC 10*3/mm3 16.60* 13.67* 16.35* 14.20*   HEMOGLOBIN g/dL 12.1* 11.5* 11.3* 12.6*   HEMATOCRIT % 36.1* 34.3* 33.9* 38.4   PLATELETS 10*3/mm3 208 191 189 212     BMP:  Results from last 7 days   Lab Units 06/20/22  0521 06/19/22  0253 06/18/22  0517 06/17/22  0249   SODIUM mmol/L 138 135* 137 137   POTASSIUM mmol/L 4.1 4.0 4.0 4.1   CHLORIDE mmol/L 104 102 104 103   CO2 mmol/L 24.0 23.0 24.0 22.0   BUN mg/dL 25* 22 22 15   CREATININE mg/dL 0.89 0.91 0.92 0.98   GLUCOSE mg/dL 312* 351* 357* 290*   CALCIUM mg/dL 8.5* 8.4* 8.6 8.6     SURGICAL PATHOLOGY RESULTS  Lab Results   Component Value Date    FINALDX  06/16/2022     Brain tumor, craniotomy:  Meningioma, meningothelial type (WHO grade I)    "   SYNOPTIC  06/16/2022     CNS: Histological Assessment  CNS: HISTOLOGICAL ASSESSMENT - All Specimens  Protocol posted: 2/26/2020    CLINICAL     History of Prior Therapy for this Neoplasm:    Not known      History of Previous Tumor and / or Familial Syndrome:    Not known      Neuroimaging Findings:    Not available     SPECIMEN     Procedure:    Resection      Specimen Size, Gross Description:    Greatest Dimension (Centimeters): 3.0 cm    TUMOR     Tumor Site:    Dura      Tumor Laterality:    Not specified      Tumor Focality:    Unifocal      Histologic Type:           :    Meningothelial meningioma      Histologic Grade:    WHO Grade I      Treatment Effect (Histological Evidence of Prior Therapy):    Not identified     SPECIAL STUDIES     Biomarker Studies:    Not performed      Designate block for future studies:    1A       GROSSDES  06/16/2022     1. Brain.   Received in a formalin filled container labeled with the patient's name, date of birth, and \"brain tumor for permanent\".  The specimen consists of a flap of dura with intact yellow-pink, slightly lobulated mass.  The dura measures 3.8 x 2.2 cm and measures less than 0.1 cm in thickness.  The intact, attached mass measures 3.0 x 2.5 x 2.3 cm.  1 side of the dura is inked blue.  The cut surface of the mass shows a yellow to gray, solid and spongy surface with no focal areas of discoloration identified.  Representative sections of the mass and attached dura are wrapped in lens paper and submitted in blocks 1A through 1C.          MICRO  06/16/2022     Histologic sections show a well-circumscribed brain lesion consisting of spindled to oval cells arranged in fascicles and whirls.  No significant cytologic atypia or increased mitotic activity is present.       Culture Results: No results found for: BLOODCX, URINECX, WOUNDCX, MRSACX, RESPCX, STOOLCX    Condition on Discharge:  Stable    Vital Signs  Temp:  [97.8 °F (36.6 °C)-98.9 °F (37.2 °C)] 98.3 °F " (36.8 °C)  Heart Rate:  [] 73  Resp:  [16-18] 18  BP: (108-156)/() 147/71    Physical Exam:   Physical Exam  Vitals and nursing note reviewed.   Constitutional:       General: He is not in acute distress.     Appearance: Normal appearance. He is well-developed and well-groomed. He is obese. He is not ill-appearing, toxic-appearing or diaphoretic.      Comments: BMI 39.98   HENT:      Head: Normocephalic and atraumatic.        Right Ear: Hearing normal.      Left Ear: Hearing normal.   Eyes:      Extraocular Movements: EOM normal.      Conjunctiva/sclera: Conjunctivae normal.      Pupils: Pupils are equal, round, and reactive to light.   Neck:      Trachea: Trachea normal.   Cardiovascular:      Rate and Rhythm: Normal rate and regular rhythm.   Pulmonary:      Effort: Pulmonary effort is normal. No tachypnea, bradypnea, accessory muscle usage or respiratory distress.   Abdominal:      Palpations: Abdomen is soft.   Musculoskeletal:      Cervical back: Full passive range of motion without pain and neck supple.   Skin:     General: Skin is warm and dry.   Neurological:      Mental Status: He is alert.      GCS: GCS eye subscore is 4. GCS verbal subscore is 5. GCS motor subscore is 6.   Psychiatric:         Speech: Speech normal.         Behavior: Behavior normal. Behavior is cooperative.        Neurologic Exam     Mental Status   Oriented to person.   Disoriented to place.   Oriented to year.   Attention: normal. Concentration: normal.   Speech: speech is normal   Level of consciousness: alert    Bright and awake.  Oriented by 3.  Follows commands without prompting showing thumbs up and 2 fingers bilaterally.  No pronator drift or dysmetria.      Cranial Nerves     CN II   Visual fields full to confrontation.     CN III, IV, VI   Pupils are equal, round, and reactive to light.  Extraocular motions are normal.     CN V   Facial sensation intact.     CN VII   Facial expression full, symmetric.     CN VIII    CN VIII normal.     CN IX, X   CN IX normal.     CN XI   CN XI normal.     Motor Exam   Right arm tone: normal  Left arm tone: normal  Right leg tone: normal  Left leg tone: normal    Strength   Right deltoid: 5/5  Left deltoid: 5/5  Right biceps: 5/5  Left biceps: 5/5  Right triceps: 5/5  Left triceps: 5/5  Right wrist extension: 5/5  Left wrist extension: 5/5  Right iliopsoas: 5/5  Left iliopsoas: 5/5  Right quadriceps: 5/5  Left quadriceps: 5/5  Right anterior tibial: 5/5  Left anterior tibial: 5/5  Right gastroc: 5/5  Left gastroc: 5/5  Right EHL 5/5  Left EHL 5/5       Sensory Exam   Right arm light touch: normal  Left arm light touch: normal  Right leg light touch: normal  Left leg light touch: normal    Gait, Coordination, and Reflexes     Tremor   Resting tremor: absent  Intention tremor: absent  Action tremor: absent    Discharge Disposition  Home or Self Care    Discharge Medications     Discharge Medications      New Medications      Instructions Start Date   butalbital-acetaminophen-caffeine -40 MG per tablet  Commonly known as: FIORICET, ESGIC   1 tablet, Oral, Every 6 Hours PRN      dexamethasone 2 MG tablet  Commonly known as: DECADRON   Take 2 tablets by mouth 2 (Two) Times a Day for 1 day, THEN 1 tablet 2 (Two) Times a Day for 2 days, THEN 0.5 tablets 2 (Two) Times a Day for 2 days.   Start Date: June 20, 2022     sennosides-docusate 8.6-50 MG per tablet  Commonly known as: PERICOLACE   1 tablet, Oral, Daily         Changes to Medications      Instructions Start Date   HYDROcodone-acetaminophen 7.5-325 MG per tablet  Commonly known as: NORCO  What changed: You were already taking a medication with the same name, and this prescription was added. Make sure you understand how and when to take each.   2 tablets, Oral, Every 6 Hours PRN      HYDROcodone-acetaminophen  MG per tablet  Commonly known as: NORCO  What changed: These instructions start on June 22, 2022. If you are unsure what to  do until then, ask your doctor or other care provider.   1 tablet, Oral, Every 6 Hours PRN   Start Date: June 22, 2022        Continue These Medications      Instructions Start Date   amLODIPine 10 MG tablet  Commonly known as: NORVASC   10 mg, Oral, Daily      atorvastatin 20 MG tablet  Commonly known as: LIPITOR   20 mg, Oral, Nightly      gabapentin 300 MG capsule  Commonly known as: NEURONTIN   300 mg, Oral, 3 Times Daily      Lantus SoloStar 100 UNIT/ML injection pen  Generic drug: Insulin Glargine   120 Units, Subcutaneous, Nightly, 55 units in the morning and 65 units at night      levETIRAcetam 500 MG tablet  Commonly known as: Keppra   500 mg, Oral, 2 Times Daily      lisinopril 40 MG tablet  Commonly known as: PRINIVIL,ZESTRIL   40 mg, Oral, Daily      omeprazole 20 MG capsule  Commonly known as: priLOSEC   20 mg, Oral, Daily      sotalol 80 MG tablet  Commonly known as: BETAPACE   80 mg, Oral, 2 Times Daily           Discharge Diet:   Diet Instructions     Advance Diet As Tolerated           Activity at Discharge:   Activity Instructions     Activity as Tolerated      Bathing Restrictions      Showers only.  No tub baths.  Shower daily.  Allow clean soapy water to cleanse incision.  Do not scrub.    Type of Restriction: Bathing    Bathing Restrictions: No Tub Bath    Driving Restrictions      Type of Restriction: Driving    Driving Restrictions: No Driving While Taking Narcotics    Gradually Increase Activity Until at Pre-Hospitalization Level      Lifting Restrictions      Type of Restriction: Lifting    Lifting Restrictions: Lifting Restriction (Indicate Limit)    Weight Limit (Pounds): 8    Length of Lifting Restriction: 2-3 WEEKS         Follow-up Appointments  No future appointments.  Additional Instructions for the Follow-ups that You Need to Schedule     Call MD With Problems / Concerns   As directed      Instructions: CALL WITH ANY NEW OR ADDITIONAL CONCERNS.    Order Comments: Instructions: CALL  WITH ANY NEW OR ADDITIONAL CONCERNS.          Discharge Follow-up with Specified Provider: Dr. Portillo; 1 Month   As directed      To: Dr. Portillo    Follow Up: 1 Month    Follow Up Details: MRI brain prior to arrival         Discharge Follow-up with Specified Provider: ADITYA Gallego; 2 Weeks   As directed      To: ADITYA Gallego    Follow Up: 2 Weeks         MRI Brain With & Without Contrast   Jul 20, 2022      STEREOTACTIC GUIDANCE PROTOCOL?: No    Will fiducial markers be needed for this procedure?: No    Release to patient: Immediate             Test Results Pending at Discharge  Pending Labs     Order Current Status    Surgical Pathology Exam Collected (06/16/22 1102)      None     ADITYA Cespedes  06/20/22  08:44 CDT    [id number]      Electronically signed by Rahul Arnold APRN at 06/20/22 0229

## 2022-06-21 NOTE — OUTREACH NOTE
Prep Survey    Flowsheet Row Responses   [facility] facility patient discharged from? [place]   Is LACE score < 7 ? No   Emergency Room discharge w/ pulse ox? No   Eligibility Readm Mgmt   Discharge diagnosis Meningioma Paroxysmal atrial fibrillation with rapid ventricular response    Does the patient have one of the following disease processes/diagnoses(primary or secondary)? Other   Does the patient have Home health ordered? No   Is there a DME ordered? No   Prep survey completed? Yes          JOSE RAFAEL WELCH - Registered Nurse

## 2022-06-21 NOTE — THERAPY DISCHARGE NOTE
Acute Care - Occupational Therapy Discharge Summary  [facility]     Patient Name: Elijah Escobar  : 1955  MRN: [id number]    Today's Date: 2022                 Admit Date: 2022        OT Recommendation and Plan    Visit Dx:    ICD-10-CM ICD-9-CM   1. Meningioma (HCC)  D32.9 225.2   2. Impaired mobility and ADLs  Z74.09 V49.89    Z78.9    3. Impaired mobility  Z74.09 799.89   4. S/P craniotomy  Z98.890 V45.89                OT Rehab Goals     Row Name 22 0700             Transfer Goal 1 (OT)    Activity/Assistive Device (Transfer Goal 1, OT) toilet  -TS      [place] Level/Cues Needed (Transfer Goal 1, OT) modified independence  -TS      Time Frame (Transfer Goal 1, OT) long term goal (LTG);10 days  -TS      Progress/Outcome (Transfer Goal 1, OT) goal met  -TS              Bathing Goal 1 (OT)    Activity/Device (Bathing Goal 1, OT) bathing skills, all  -TS      [place] Level/Cues Needed (Bathing Goal 1, OT) modified independence  -TS      Time Frame (Bathing Goal 1, OT) long term goal (LTG);10 days  -TS      Progress/Outcomes (Bathing Goal 1, OT) goal met  -TS              Dressing Goal 1 (OT)    Activity/Device (Dressing Goal 1, OT) other (see comments)  -TS      [place]/Cues Needed (Dressing Goal 1, OT) modified independence  -TS      Time Frame (Dressing Goal 1, OT) long term goal (LTG);10 days  -TS      Progress/Outcome (Dressing Goal 1, OT) goal met  -TS              Toileting Goal 1 (OT)    Activity/Device (Toileting Goal 1, OT) toileting skills, all  -TS      [place]gard Level/Cues Needed (Toileting Goal 1, OT) modified independence  -TS      Time Frame (Toileting Goal 1, OT) long term goal (LTG);10 days  -TS      Progress/Outcome (Toileting Goal 1, OT) goal met  -TS            User Key  (r) = Recorded By, (t) = Taken By, (c) = Cosigned By    Initials Name Provider Type Discipline    TS Roslyn Madrigal COTA Occupational Therapist Assistant OT                 Outcome  Measures     Row Name 06/20/22 1030 06/19/22 1402 06/18/22 0820       How much help from another person do you currently need...    Turning from your back to your side while in flat bed without using bedrails? 4  -KJ 4  -CHRISTIANO 4  -CHRISTIANO    Moving from lying on back to sitting on the side of a flat bed without bedrails? 4  -KJ 4  -CHRISTIANO 4  -CHRISTIANO    Moving to and from a bed to a chair (including a wheelchair)? 3  -KJ 3  -CHRISTIANO 3  -CHRISTIANO    Standing up from a chair using your arms (e.g., wheelchair, bedside chair)? 3  -KJ 3  -CHRISTIANO 3  -CHRISTIANO    Climbing 3-5 steps with a railing? 3  -KJ 3  -CHRISTIANO 3  -CHRISTIANO    To walk in hospital room? 3  -KJ 3  -CHRISTIANO 3  -CHRISTIANO    AM-PAC 6 Clicks Score (PT) 20  -KJ 20  -CHRISTIANO 20  -CHRISTIANO       Functional Assessment    Outcome Measure Options AM-PAC 6 Clicks Basic Mobility (PT)  -KJ AM-PAC 6 Clicks Basic Mobility (PT)  -CHRISTIANO AM-PAC 6 Clicks Basic Mobility (PT)  -CHRISTIANO          User Key  (r) = Recorded By, (t) = Taken By, (c) = Cosigned By    Initials Name Provider Type    Ivett Truong, PTA Physical Therapist Assistant    Александр Morley PTA Physical Therapist Assistant                Timed Therapy Charges  Total Units: 5    Charges  Total Units: 5    Procedure Name Documented Minutes Units Code    HC OT SELF CARE/MGMT/TRAIN EA 15 MIN 68  5    70595 (CPT®)               Documented Minutes  Total Minutes: 68    Therapy Provided Minutes    33468 - OT Self Care/Mgmt Minutes 68                Therapy Charges for Today     Code Description Service Date Service Provider Modifiers Qty    [id number] HC OT SELF CARE/MGMT/TRAIN EA 15 MIN 6/20/2022 Roslyn Madrigal COTA GO 5          OT Discharge Summary  Anticipated Discharge Disposition (OT): home with assist  Reason for Discharge: Discharge from facility  Outcomes Achieved: Refer to plan of care for updates on goals achieved  Discharge Destination: Home with assist      AJ Chandler  6/21/2022

## 2022-06-22 ENCOUNTER — READMISSION MANAGEMENT (OUTPATIENT)
Dept: CALL CENTER | Facility: HOSPITAL | Age: 67
End: 2022-06-22

## 2022-06-22 LAB
QT INTERVAL: 360 MS
QTC INTERVAL: 502 MS

## 2022-06-22 NOTE — OUTREACH NOTE
Medical Week 1 Survey    Flowsheet Row Responses   [facility] patient discharged from? [place]   Does the patient have one of the following disease processes/diagnoses(primary or secondary)? Other   Week 1 attempt successful? Yes   Call start time 1440   Call end time 1447   List who call center can speak with pt   Meds reviewed with patient/caregiver? Yes   Is the patient having any side effects they believe may be caused by any medication additions or changes? No   Does the patient have all medications ordered at discharge? Yes   Is the patient taking all medications as directed (includes completed medication regime)? Yes   Comments regarding appointments Pt to see surgeon on July 5, 2022.   Does the patient have a primary care provider?  Yes   Has the patient kept scheduled appointments due by today? N/A   Has home health visited the patient within 72 hours of discharge? Unsure   Psychosocial issues? No   Did the patient receive a copy of their discharge instructions? Yes   Nursing interventions Reviewed instructions with patient   What is the patient's perception of their health status since discharge? Improving   Is the patient/caregiver able to teach back signs and symptoms related to disease process for when to call PCP? Yes   Is the patient/caregiver able to teach back signs and symptoms related to disease process for when to call 911? Yes   Is the patient/caregiver able to teach back the hierarchy of who to call/visit for symptoms/problems? PCP, Specialist, Home health nurse, Urgent Care, ED, 911 Yes   Week 1 call completed? Yes   Wrap up additional comments Pt reports recovering well. Pt very drowsy due to taking pain medication. Pt has follow up with surgeon scheduled.          MIGUEL FRANCOIS - Registered Nurse

## 2022-06-23 ENCOUNTER — APPOINTMENT (OUTPATIENT)
Dept: CT IMAGING | Facility: HOSPITAL | Age: 67
End: 2022-06-23

## 2022-06-23 ENCOUNTER — APPOINTMENT (OUTPATIENT)
Dept: GENERAL RADIOLOGY | Facility: HOSPITAL | Age: 67
End: 2022-06-23

## 2022-06-23 ENCOUNTER — HOSPITAL ENCOUNTER (INPATIENT)
Facility: HOSPITAL | Age: 67
LOS: 1 days | Discharge: HOME | End: 2022-06-25
Attending: STUDENT IN AN ORGANIZED HEALTH CARE EDUCATION/TRAINING PROGRAM | Admitting: INTERNAL MEDICINE

## 2022-06-23 DIAGNOSIS — N39.0 URINARY TRACT INFECTION WITH HEMATURIA, SITE UNSPECIFIED: ICD-10-CM

## 2022-06-23 DIAGNOSIS — D72.829 LEUKOCYTOSIS, UNSPECIFIED TYPE: ICD-10-CM

## 2022-06-23 DIAGNOSIS — R31.9 URINARY TRACT INFECTION WITH HEMATURIA, SITE UNSPECIFIED: ICD-10-CM

## 2022-06-23 DIAGNOSIS — I48.91 ATRIAL FIBRILLATION, UNSPECIFIED TYPE: Primary | ICD-10-CM

## 2022-06-23 LAB
ALBUMIN SERPL-MCNC: 3.4 G/DL (ref 3.5–5.2)
ALBUMIN/GLOB SERPL: 0.9 G/DL
ALP SERPL-CCNC: 76 U/L (ref 39–117)
ALT SERPL W P-5'-P-CCNC: 16 U/L (ref 1–41)
ANION GAP SERPL CALCULATED.3IONS-SCNC: 12 MMOL/L (ref 5–15)
AST SERPL-CCNC: 16 U/L (ref 1–40)
BILIRUB SERPL-MCNC: 0.4 MG/DL (ref 0–1.2)
BUN SERPL-MCNC: 21 MG/DL (ref 8–23)
BUN/CREAT SERPL: 19.6 (ref 7–25)
CALCIUM SPEC-SCNC: 8.2 MG/DL (ref 8.6–10.5)
CHLORIDE SERPL-SCNC: 102 MMOL/L (ref 98–107)
CO2 SERPL-SCNC: 25 MMOL/L (ref 22–29)
CREAT SERPL-MCNC: 1.07 MG/DL (ref 0.76–1.27)
D DIMER PPP FEU-MCNC: 0.93 MCGFEU/ML (ref 0–0.5)
D-LACTATE SERPL-SCNC: 1.7 MMOL/L (ref 0.5–2)
DEPRECATED RDW RBC AUTO: 45.3 FL (ref 37–54)
EGFRCR SERPLBLD CKD-EPI 2021: 76.5 ML/MIN/1.73
EOSINOPHIL # BLD MANUAL: 0.13 10*3/MM3 (ref 0–0.4)
EOSINOPHIL NFR BLD MANUAL: 1 % (ref 0.3–6.2)
ERYTHROCYTE [DISTWIDTH] IN BLOOD BY AUTOMATED COUNT: 13 % (ref 12.3–15.4)
FLUAV RNA RESP QL NAA+PROBE: NOT DETECTED
FLUBV RNA RESP QL NAA+PROBE: NOT DETECTED
GLOBULIN UR ELPH-MCNC: 3.7 GM/DL
GLUCOSE SERPL-MCNC: 99 MG/DL (ref 65–99)
HCT VFR BLD AUTO: 37 % (ref 37.5–51)
HGB BLD-MCNC: 12.3 G/DL (ref 13–17.7)
LYMPHOCYTES # BLD MANUAL: 2.19 10*3/MM3 (ref 0.7–3.1)
LYMPHOCYTES NFR BLD MANUAL: 4 % (ref 5–12)
MAGNESIUM SERPL-MCNC: 2.4 MG/DL (ref 1.6–2.4)
MCH RBC QN AUTO: 31.7 PG (ref 26.6–33)
MCHC RBC AUTO-ENTMCNC: 33.2 G/DL (ref 31.5–35.7)
MCV RBC AUTO: 95.4 FL (ref 79–97)
MONOCYTES # BLD: 0.52 10*3/MM3 (ref 0.1–0.9)
NEUTROPHILS # BLD AUTO: 10.06 10*3/MM3 (ref 1.7–7)
NEUTROPHILS NFR BLD MANUAL: 77 % (ref 42.7–76)
NEUTS BAND NFR BLD MANUAL: 1 % (ref 0–5)
NRBC BLD AUTO-RTO: 0 /100 WBC (ref 0–0.2)
PLAT MORPH BLD: NORMAL
PLATELET # BLD AUTO: 160 10*3/MM3 (ref 140–450)
PMV BLD AUTO: 10.9 FL (ref 6–12)
POTASSIUM SERPL-SCNC: 3.5 MMOL/L (ref 3.5–5.2)
PROCALCITONIN SERPL-MCNC: 0.78 NG/ML (ref 0–0.25)
PROT SERPL-MCNC: 7.1 G/DL (ref 6–8.5)
RBC # BLD AUTO: 3.88 10*6/MM3 (ref 4.14–5.8)
RBC MORPH BLD: NORMAL
SARS-COV-2 RNA RESP QL NAA+PROBE: NOT DETECTED
SODIUM SERPL-SCNC: 139 MMOL/L (ref 136–145)
T4 FREE SERPL-MCNC: 1.3 NG/DL (ref 0.93–1.7)
TROPONIN T SERPL-MCNC: <0.01 NG/ML (ref 0–0.03)
TSH SERPL DL<=0.05 MIU/L-ACNC: 0.31 UIU/ML (ref 0.27–4.2)
VARIANT LYMPHS NFR BLD MANUAL: 15 % (ref 19.6–45.3)
VARIANT LYMPHS NFR BLD MANUAL: 2 % (ref 0–5)
WBC MORPH BLD: NORMAL
WBC NRBC COR # BLD: 12.9 10*3/MM3 (ref 3.4–10.8)

## 2022-06-23 PROCEDURE — 84439 ASSAY OF FREE THYROXINE: CPT | Performed by: STUDENT IN AN ORGANIZED HEALTH CARE EDUCATION/TRAINING PROGRAM

## 2022-06-23 PROCEDURE — 84484 ASSAY OF TROPONIN QUANT: CPT | Performed by: STUDENT IN AN ORGANIZED HEALTH CARE EDUCATION/TRAINING PROGRAM

## 2022-06-23 PROCEDURE — 99285 EMERGENCY DEPT VISIT HI MDM: CPT

## 2022-06-23 PROCEDURE — 83735 ASSAY OF MAGNESIUM: CPT | Performed by: STUDENT IN AN ORGANIZED HEALTH CARE EDUCATION/TRAINING PROGRAM

## 2022-06-23 PROCEDURE — 84145 PROCALCITONIN (PCT): CPT | Performed by: STUDENT IN AN ORGANIZED HEALTH CARE EDUCATION/TRAINING PROGRAM

## 2022-06-23 PROCEDURE — 87040 BLOOD CULTURE FOR BACTERIA: CPT | Performed by: STUDENT IN AN ORGANIZED HEALTH CARE EDUCATION/TRAINING PROGRAM

## 2022-06-23 PROCEDURE — 83605 ASSAY OF LACTIC ACID: CPT | Performed by: STUDENT IN AN ORGANIZED HEALTH CARE EDUCATION/TRAINING PROGRAM

## 2022-06-23 PROCEDURE — 71045 X-RAY EXAM CHEST 1 VIEW: CPT

## 2022-06-23 PROCEDURE — 80053 COMPREHEN METABOLIC PANEL: CPT | Performed by: STUDENT IN AN ORGANIZED HEALTH CARE EDUCATION/TRAINING PROGRAM

## 2022-06-23 PROCEDURE — 87636 SARSCOV2 & INF A&B AMP PRB: CPT | Performed by: STUDENT IN AN ORGANIZED HEALTH CARE EDUCATION/TRAINING PROGRAM

## 2022-06-23 PROCEDURE — 70450 CT HEAD/BRAIN W/O DYE: CPT

## 2022-06-23 PROCEDURE — 81001 URINALYSIS AUTO W/SCOPE: CPT | Performed by: STUDENT IN AN ORGANIZED HEALTH CARE EDUCATION/TRAINING PROGRAM

## 2022-06-23 PROCEDURE — 87086 URINE CULTURE/COLONY COUNT: CPT | Performed by: NURSE PRACTITIONER

## 2022-06-23 PROCEDURE — 85379 FIBRIN DEGRADATION QUANT: CPT | Performed by: STUDENT IN AN ORGANIZED HEALTH CARE EDUCATION/TRAINING PROGRAM

## 2022-06-23 PROCEDURE — 85007 BL SMEAR W/DIFF WBC COUNT: CPT | Performed by: STUDENT IN AN ORGANIZED HEALTH CARE EDUCATION/TRAINING PROGRAM

## 2022-06-23 PROCEDURE — 36415 COLL VENOUS BLD VENIPUNCTURE: CPT

## 2022-06-23 PROCEDURE — 84443 ASSAY THYROID STIM HORMONE: CPT | Performed by: STUDENT IN AN ORGANIZED HEALTH CARE EDUCATION/TRAINING PROGRAM

## 2022-06-23 PROCEDURE — 85025 COMPLETE CBC W/AUTO DIFF WBC: CPT | Performed by: STUDENT IN AN ORGANIZED HEALTH CARE EDUCATION/TRAINING PROGRAM

## 2022-06-23 PROCEDURE — 0 HYDROMORPHONE 1 MG/ML SOLUTION: Performed by: STUDENT IN AN ORGANIZED HEALTH CARE EDUCATION/TRAINING PROGRAM

## 2022-06-23 RX ORDER — DILTIAZEM HYDROCHLORIDE 5 MG/ML
20 INJECTION INTRAVENOUS ONCE
Status: COMPLETED | OUTPATIENT
Start: 2022-06-23 | End: 2022-06-23

## 2022-06-23 RX ORDER — DILTIAZEM HCL IN NACL,ISO-OSM 125 MG/125
5-15 PLASTIC BAG, INJECTION (ML) INTRAVENOUS
Status: DISCONTINUED | OUTPATIENT
Start: 2022-06-23 | End: 2022-06-25

## 2022-06-23 RX ADMIN — HYDROMORPHONE HYDROCHLORIDE 0.5 MG: 1 INJECTION, SOLUTION INTRAMUSCULAR; INTRAVENOUS; SUBCUTANEOUS at 22:51

## 2022-06-23 RX ADMIN — DILTIAZEM HYDROCHLORIDE 20 MG: 5 INJECTION INTRAVENOUS at 22:52

## 2022-06-23 RX ADMIN — Medication 5 MG/HR: at 22:57

## 2022-06-23 RX ADMIN — SODIUM CHLORIDE, POTASSIUM CHLORIDE, SODIUM LACTATE AND CALCIUM CHLORIDE 1000 ML: 600; 310; 30; 20 INJECTION, SOLUTION INTRAVENOUS at 23:11

## 2022-06-24 ENCOUNTER — READMISSION MANAGEMENT (OUTPATIENT)
Dept: CALL CENTER | Facility: HOSPITAL | Age: 67
End: 2022-06-24

## 2022-06-24 ENCOUNTER — APPOINTMENT (OUTPATIENT)
Dept: CT IMAGING | Facility: HOSPITAL | Age: 67
End: 2022-06-24

## 2022-06-24 PROBLEM — I48.91 ATRIAL FIBRILLATION WITH RVR (HCC): Status: ACTIVE | Noted: 2022-06-24

## 2022-06-24 LAB
ALBUMIN SERPL-MCNC: 2.8 G/DL (ref 3.5–5.2)
ALBUMIN/GLOB SERPL: 0.8 G/DL
ALP SERPL-CCNC: 65 U/L (ref 39–117)
ALT SERPL W P-5'-P-CCNC: 14 U/L (ref 1–41)
ANION GAP SERPL CALCULATED.3IONS-SCNC: 10 MMOL/L (ref 5–15)
AST SERPL-CCNC: 11 U/L (ref 1–40)
BACTERIA UR QL AUTO: ABNORMAL /HPF
BILIRUB SERPL-MCNC: 0.4 MG/DL (ref 0–1.2)
BILIRUB UR QL STRIP: ABNORMAL
BUN SERPL-MCNC: 17 MG/DL (ref 8–23)
BUN/CREAT SERPL: 16.7 (ref 7–25)
CALCIUM SPEC-SCNC: 7.9 MG/DL (ref 8.6–10.5)
CHLORIDE SERPL-SCNC: 103 MMOL/L (ref 98–107)
CLARITY UR: ABNORMAL
CO2 SERPL-SCNC: 25 MMOL/L (ref 22–29)
COLOR UR: ABNORMAL
CREAT SERPL-MCNC: 1.02 MG/DL (ref 0.76–1.27)
DEPRECATED RDW RBC AUTO: 45.5 FL (ref 37–54)
EGFRCR SERPLBLD CKD-EPI 2021: 81.1 ML/MIN/1.73
ERYTHROCYTE [DISTWIDTH] IN BLOOD BY AUTOMATED COUNT: 12.9 % (ref 12.3–15.4)
GLOBULIN UR ELPH-MCNC: 3.4 GM/DL
GLUCOSE BLDC GLUCOMTR-MCNC: 129 MG/DL (ref 70–130)
GLUCOSE BLDC GLUCOMTR-MCNC: 145 MG/DL (ref 70–130)
GLUCOSE BLDC GLUCOMTR-MCNC: 154 MG/DL (ref 70–130)
GLUCOSE BLDC GLUCOMTR-MCNC: 63 MG/DL (ref 70–130)
GLUCOSE BLDC GLUCOMTR-MCNC: 93 MG/DL (ref 70–130)
GLUCOSE SERPL-MCNC: 116 MG/DL (ref 65–99)
GLUCOSE UR STRIP-MCNC: NEGATIVE MG/DL
HCT VFR BLD AUTO: 34.2 % (ref 37.5–51)
HGB BLD-MCNC: 11.3 G/DL (ref 13–17.7)
HGB UR QL STRIP.AUTO: ABNORMAL
HYALINE CASTS UR QL AUTO: ABNORMAL /LPF
KETONES UR QL STRIP: ABNORMAL
LEUKOCYTE ESTERASE UR QL STRIP.AUTO: ABNORMAL
LYMPHOCYTES # BLD MANUAL: 1.27 10*3/MM3 (ref 0.7–3.1)
LYMPHOCYTES NFR BLD MANUAL: 8.1 % (ref 5–12)
MCH RBC QN AUTO: 31.7 PG (ref 26.6–33)
MCHC RBC AUTO-ENTMCNC: 33 G/DL (ref 31.5–35.7)
MCV RBC AUTO: 96.1 FL (ref 79–97)
MONOCYTES # BLD: 1.01 10*3/MM3 (ref 0.1–0.9)
NEUTROPHILS # BLD AUTO: 10.25 10*3/MM3 (ref 1.7–7)
NEUTROPHILS NFR BLD MANUAL: 81.8 % (ref 42.7–76)
NEUTS VAC BLD QL SMEAR: ABNORMAL
NITRITE UR QL STRIP: NEGATIVE
PH UR STRIP.AUTO: 5.5 [PH] (ref 5–8)
PLAT MORPH BLD: NORMAL
PLATELET # BLD AUTO: 140 10*3/MM3 (ref 140–450)
PMV BLD AUTO: 10.8 FL (ref 6–12)
POTASSIUM SERPL-SCNC: 3.5 MMOL/L (ref 3.5–5.2)
PROT SERPL-MCNC: 6.2 G/DL (ref 6–8.5)
PROT UR QL STRIP: ABNORMAL
RBC # BLD AUTO: 3.56 10*6/MM3 (ref 4.14–5.8)
RBC # UR STRIP: ABNORMAL /HPF
RBC MORPH BLD: NORMAL
REF LAB TEST METHOD: ABNORMAL
SODIUM SERPL-SCNC: 138 MMOL/L (ref 136–145)
SP GR UR STRIP: >1.03 (ref 1–1.03)
SQUAMOUS #/AREA URNS HPF: ABNORMAL /HPF
TROPONIN T SERPL-MCNC: <0.01 NG/ML (ref 0–0.03)
UROBILINOGEN UR QL STRIP: ABNORMAL
VARIANT LYMPHS NFR BLD MANUAL: 1 % (ref 0–5)
VARIANT LYMPHS NFR BLD MANUAL: 9.1 % (ref 19.6–45.3)
WBC # UR STRIP: ABNORMAL /HPF
WBC NRBC COR # BLD: 12.53 10*3/MM3 (ref 3.4–10.8)
YEAST URNS QL MICRO: ABNORMAL /HPF

## 2022-06-24 PROCEDURE — 99024 POSTOP FOLLOW-UP VISIT: CPT | Performed by: NURSE PRACTITIONER

## 2022-06-24 PROCEDURE — 80053 COMPREHEN METABOLIC PANEL: CPT | Performed by: INTERNAL MEDICINE

## 2022-06-24 PROCEDURE — 82962 GLUCOSE BLOOD TEST: CPT

## 2022-06-24 PROCEDURE — 71275 CT ANGIOGRAPHY CHEST: CPT

## 2022-06-24 PROCEDURE — 85025 COMPLETE CBC W/AUTO DIFF WBC: CPT | Performed by: INTERNAL MEDICINE

## 2022-06-24 PROCEDURE — 84484 ASSAY OF TROPONIN QUANT: CPT | Performed by: STUDENT IN AN ORGANIZED HEALTH CARE EDUCATION/TRAINING PROGRAM

## 2022-06-24 PROCEDURE — 25010000002 CEFTRIAXONE PER 250 MG: Performed by: STUDENT IN AN ORGANIZED HEALTH CARE EDUCATION/TRAINING PROGRAM

## 2022-06-24 PROCEDURE — 85007 BL SMEAR W/DIFF WBC COUNT: CPT | Performed by: INTERNAL MEDICINE

## 2022-06-24 PROCEDURE — 63710000001 INSULIN DETEMIR PER 5 UNITS: Performed by: INTERNAL MEDICINE

## 2022-06-24 PROCEDURE — 0 IOPAMIDOL PER 1 ML: Performed by: STUDENT IN AN ORGANIZED HEALTH CARE EDUCATION/TRAINING PROGRAM

## 2022-06-24 PROCEDURE — 0 HYDROMORPHONE 1 MG/ML SOLUTION: Performed by: INTERNAL MEDICINE

## 2022-06-24 RX ORDER — SODIUM CHLORIDE 0.9 % (FLUSH) 0.9 %
10 SYRINGE (ML) INJECTION EVERY 12 HOURS SCHEDULED
Status: DISCONTINUED | OUTPATIENT
Start: 2022-06-24 | End: 2022-06-25 | Stop reason: HOSPADM

## 2022-06-24 RX ORDER — PANTOPRAZOLE SODIUM 40 MG/1
40 TABLET, DELAYED RELEASE ORAL
Status: DISCONTINUED | OUTPATIENT
Start: 2022-06-24 | End: 2022-06-25 | Stop reason: HOSPADM

## 2022-06-24 RX ORDER — ACETAMINOPHEN 325 MG/1
650 TABLET ORAL EVERY 4 HOURS PRN
Status: DISCONTINUED | OUTPATIENT
Start: 2022-06-24 | End: 2022-06-25 | Stop reason: HOSPADM

## 2022-06-24 RX ORDER — SODIUM CHLORIDE 0.9 % (FLUSH) 0.9 %
10 SYRINGE (ML) INJECTION AS NEEDED
Status: DISCONTINUED | OUTPATIENT
Start: 2022-06-24 | End: 2022-06-25 | Stop reason: HOSPADM

## 2022-06-24 RX ORDER — DEXAMETHASONE 2 MG/1
1 TABLET ORAL EVERY 12 HOURS SCHEDULED
Status: COMPLETED | OUTPATIENT
Start: 2022-06-24 | End: 2022-06-24

## 2022-06-24 RX ORDER — HYDROCODONE BITARTRATE AND ACETAMINOPHEN 10; 325 MG/1; MG/1
1 TABLET ORAL EVERY 6 HOURS PRN
Status: ON HOLD | COMMUNITY
End: 2022-07-01

## 2022-06-24 RX ORDER — GABAPENTIN 300 MG/1
300 CAPSULE ORAL 3 TIMES DAILY
Status: DISCONTINUED | OUTPATIENT
Start: 2022-06-24 | End: 2022-06-25 | Stop reason: HOSPADM

## 2022-06-24 RX ORDER — LISINOPRIL 20 MG/1
40 TABLET ORAL DAILY
Status: DISCONTINUED | OUTPATIENT
Start: 2022-06-24 | End: 2022-06-25 | Stop reason: HOSPADM

## 2022-06-24 RX ORDER — SOTALOL HYDROCHLORIDE 80 MG/1
80 TABLET ORAL 2 TIMES DAILY
Status: DISCONTINUED | OUTPATIENT
Start: 2022-06-24 | End: 2022-06-24

## 2022-06-24 RX ORDER — INSULIN LISPRO 100 [IU]/ML
2-7 INJECTION, SOLUTION INTRAVENOUS; SUBCUTANEOUS
Status: DISCONTINUED | OUTPATIENT
Start: 2022-06-24 | End: 2022-06-25 | Stop reason: HOSPADM

## 2022-06-24 RX ORDER — DILTIAZEM HCL IN NACL,ISO-OSM 125 MG/125
5-15 PLASTIC BAG, INJECTION (ML) INTRAVENOUS
Status: DISCONTINUED | OUTPATIENT
Start: 2022-06-24 | End: 2022-06-24 | Stop reason: SDUPTHER

## 2022-06-24 RX ORDER — NICOTINE POLACRILEX 4 MG
15 LOZENGE BUCCAL
Status: DISCONTINUED | OUTPATIENT
Start: 2022-06-24 | End: 2022-06-25 | Stop reason: HOSPADM

## 2022-06-24 RX ORDER — INSULIN GLARGINE 100 [IU]/ML
65 INJECTION, SOLUTION SUBCUTANEOUS NIGHTLY
Status: ON HOLD | COMMUNITY
End: 2022-07-01

## 2022-06-24 RX ORDER — LEVETIRACETAM 500 MG/1
500 TABLET ORAL 2 TIMES DAILY
Status: DISCONTINUED | OUTPATIENT
Start: 2022-06-24 | End: 2022-06-25 | Stop reason: HOSPADM

## 2022-06-24 RX ORDER — SOTALOL HYDROCHLORIDE 80 MG/1
120 TABLET ORAL 2 TIMES DAILY
Status: DISCONTINUED | OUTPATIENT
Start: 2022-06-24 | End: 2022-06-25 | Stop reason: HOSPADM

## 2022-06-24 RX ORDER — BUTALBITAL, ACETAMINOPHEN AND CAFFEINE 50; 325; 40 MG/1; MG/1; MG/1
1 TABLET ORAL EVERY 6 HOURS PRN
Status: DISCONTINUED | OUTPATIENT
Start: 2022-06-24 | End: 2022-06-25 | Stop reason: HOSPADM

## 2022-06-24 RX ORDER — HYDROCODONE BITARTRATE AND ACETAMINOPHEN 10; 325 MG/1; MG/1
1 TABLET ORAL EVERY 6 HOURS PRN
Status: DISCONTINUED | OUTPATIENT
Start: 2022-06-24 | End: 2022-06-25 | Stop reason: HOSPADM

## 2022-06-24 RX ORDER — SOTALOL HYDROCHLORIDE 80 MG/1
80 TABLET ORAL 2 TIMES DAILY
COMMUNITY
End: 2022-06-25 | Stop reason: HOSPADM

## 2022-06-24 RX ORDER — ACETAMINOPHEN 500 MG
1000 TABLET ORAL ONCE
Status: COMPLETED | OUTPATIENT
Start: 2022-06-24 | End: 2022-06-24

## 2022-06-24 RX ORDER — DEXTROSE MONOHYDRATE 25 G/50ML
25 INJECTION, SOLUTION INTRAVENOUS
Status: DISCONTINUED | OUTPATIENT
Start: 2022-06-24 | End: 2022-06-25 | Stop reason: HOSPADM

## 2022-06-24 RX ORDER — SOTALOL HYDROCHLORIDE 80 MG/1
120 TABLET ORAL 2 TIMES DAILY
Status: DISCONTINUED | OUTPATIENT
Start: 2022-06-24 | End: 2022-06-24

## 2022-06-24 RX ORDER — ONDANSETRON 2 MG/ML
4 INJECTION INTRAMUSCULAR; INTRAVENOUS EVERY 6 HOURS PRN
Status: DISCONTINUED | OUTPATIENT
Start: 2022-06-24 | End: 2022-06-25 | Stop reason: HOSPADM

## 2022-06-24 RX ORDER — AMOXICILLIN 250 MG
1 CAPSULE ORAL DAILY
Status: DISCONTINUED | OUTPATIENT
Start: 2022-06-24 | End: 2022-06-25 | Stop reason: HOSPADM

## 2022-06-24 RX ORDER — ATORVASTATIN CALCIUM 10 MG/1
20 TABLET, FILM COATED ORAL NIGHTLY
Status: DISCONTINUED | OUTPATIENT
Start: 2022-06-24 | End: 2022-06-25 | Stop reason: HOSPADM

## 2022-06-24 RX ADMIN — DILTIAZEM HYDROCHLORIDE 30 MG: 30 TABLET, FILM COATED ORAL at 12:01

## 2022-06-24 RX ADMIN — INSULIN DETEMIR 65 UNITS: 100 INJECTION, SOLUTION SUBCUTANEOUS at 22:54

## 2022-06-24 RX ADMIN — Medication 10 MG/HR: at 09:44

## 2022-06-24 RX ADMIN — Medication 10 ML: at 21:08

## 2022-06-24 RX ADMIN — SOTALOL HYDROCHLORIDE 120 MG: 80 TABLET ORAL at 12:36

## 2022-06-24 RX ADMIN — DEXAMETHASONE 1 MG: 2 TABLET ORAL at 12:36

## 2022-06-24 RX ADMIN — ACETAMINOPHEN 1000 MG: 500 TABLET ORAL at 03:05

## 2022-06-24 RX ADMIN — HYDROCODONE BITARTRATE AND ACETAMINOPHEN 1 TABLET: 10; 325 TABLET ORAL at 17:00

## 2022-06-24 RX ADMIN — DOCUSATE SODIUM 50 MG AND SENNOSIDES 8.6 MG 1 TABLET: 8.6; 5 TABLET, FILM COATED ORAL at 12:36

## 2022-06-24 RX ADMIN — DILTIAZEM HYDROCHLORIDE 30 MG: 30 TABLET, FILM COATED ORAL at 18:22

## 2022-06-24 RX ADMIN — GABAPENTIN 300 MG: 300 CAPSULE ORAL at 21:05

## 2022-06-24 RX ADMIN — HYDROCODONE BITARTRATE AND ACETAMINOPHEN 1 TABLET: 10; 325 TABLET ORAL at 09:43

## 2022-06-24 RX ADMIN — HYDROCODONE BITARTRATE AND ACETAMINOPHEN 1 TABLET: 10; 325 TABLET ORAL at 22:45

## 2022-06-24 RX ADMIN — GABAPENTIN 300 MG: 300 CAPSULE ORAL at 12:40

## 2022-06-24 RX ADMIN — Medication 10 ML: at 17:06

## 2022-06-24 RX ADMIN — SOTALOL HYDROCHLORIDE 120 MG: 80 TABLET ORAL at 21:03

## 2022-06-24 RX ADMIN — LEVETIRACETAM 500 MG: 500 TABLET, FILM COATED ORAL at 12:41

## 2022-06-24 RX ADMIN — DEXAMETHASONE 1 MG: 2 TABLET ORAL at 22:45

## 2022-06-24 RX ADMIN — SODIUM CHLORIDE 1 G: 9 INJECTION, SOLUTION INTRAVENOUS at 00:38

## 2022-06-24 RX ADMIN — HYDROMORPHONE HYDROCHLORIDE 0.5 MG: 1 INJECTION, SOLUTION INTRAMUSCULAR; INTRAVENOUS; SUBCUTANEOUS at 14:17

## 2022-06-24 RX ADMIN — LEVETIRACETAM 500 MG: 500 TABLET, FILM COATED ORAL at 21:03

## 2022-06-24 RX ADMIN — INSULIN DETEMIR 55 UNITS: 100 INJECTION, SOLUTION SUBCUTANEOUS at 10:25

## 2022-06-24 RX ADMIN — GABAPENTIN 300 MG: 300 CAPSULE ORAL at 17:00

## 2022-06-24 RX ADMIN — PANTOPRAZOLE SODIUM 40 MG: 40 TABLET, DELAYED RELEASE ORAL at 06:16

## 2022-06-24 RX ADMIN — IOPAMIDOL 100 ML: 755 INJECTION, SOLUTION INTRAVENOUS at 01:13

## 2022-06-24 NOTE — OUTREACH NOTE
Medical Week 2 Survey    Flowsheet Row Responses   [facility] patient discharged from? [place]   Does the patient have one of the following disease processes/diagnoses(primary or secondary)? Other   Week 2 attempt successful? No   Unsuccessful attempts Attempt 1   Revoke Readmitted          CARMELITA OLMEDO - Registered Nurse

## 2022-06-25 VITALS
DIASTOLIC BLOOD PRESSURE: 63 MMHG | WEIGHT: 278.9 LBS | BODY MASS INDEX: 39.93 KG/M2 | RESPIRATION RATE: 18 BRPM | HEART RATE: 103 BPM | OXYGEN SATURATION: 98 % | HEIGHT: 70 IN | SYSTOLIC BLOOD PRESSURE: 110 MMHG | TEMPERATURE: 98 F

## 2022-06-25 LAB
ANION GAP SERPL CALCULATED.3IONS-SCNC: 10 MMOL/L (ref 5–15)
BACTERIA SPEC AEROBE CULT: NO GROWTH
BUN SERPL-MCNC: 13 MG/DL (ref 8–23)
BUN/CREAT SERPL: 16.7 (ref 7–25)
CALCIUM SPEC-SCNC: 7.8 MG/DL (ref 8.6–10.5)
CHLORIDE SERPL-SCNC: 101 MMOL/L (ref 98–107)
CO2 SERPL-SCNC: 24 MMOL/L (ref 22–29)
CREAT SERPL-MCNC: 0.78 MG/DL (ref 0.76–1.27)
DEPRECATED RDW RBC AUTO: 44.4 FL (ref 37–54)
EGFRCR SERPLBLD CKD-EPI 2021: 98.4 ML/MIN/1.73
ERYTHROCYTE [DISTWIDTH] IN BLOOD BY AUTOMATED COUNT: 12.7 % (ref 12.3–15.4)
GLUCOSE BLDC GLUCOMTR-MCNC: 115 MG/DL (ref 70–130)
GLUCOSE BLDC GLUCOMTR-MCNC: 132 MG/DL (ref 70–130)
GLUCOSE BLDC GLUCOMTR-MCNC: 186 MG/DL (ref 70–130)
GLUCOSE SERPL-MCNC: 175 MG/DL (ref 65–99)
HCT VFR BLD AUTO: 34.2 % (ref 37.5–51)
HGB BLD-MCNC: 11.5 G/DL (ref 13–17.7)
MCH RBC QN AUTO: 31.9 PG (ref 26.6–33)
MCHC RBC AUTO-ENTMCNC: 33.6 G/DL (ref 31.5–35.7)
MCV RBC AUTO: 94.7 FL (ref 79–97)
PLATELET # BLD AUTO: 154 10*3/MM3 (ref 140–450)
PMV BLD AUTO: 10.8 FL (ref 6–12)
POTASSIUM SERPL-SCNC: 3.8 MMOL/L (ref 3.5–5.2)
RBC # BLD AUTO: 3.61 10*6/MM3 (ref 4.14–5.8)
SODIUM SERPL-SCNC: 135 MMOL/L (ref 136–145)
WBC NRBC COR # BLD: 14.2 10*3/MM3 (ref 3.4–10.8)

## 2022-06-25 PROCEDURE — 63710000001 INSULIN LISPRO (HUMAN) PER 5 UNITS: Performed by: INTERNAL MEDICINE

## 2022-06-25 PROCEDURE — 85027 COMPLETE CBC AUTOMATED: CPT | Performed by: NURSE PRACTITIONER

## 2022-06-25 PROCEDURE — 82962 GLUCOSE BLOOD TEST: CPT

## 2022-06-25 PROCEDURE — 80048 BASIC METABOLIC PNL TOTAL CA: CPT | Performed by: NURSE PRACTITIONER

## 2022-06-25 PROCEDURE — 25010000002 CEFTRIAXONE PER 250 MG: Performed by: INTERNAL MEDICINE

## 2022-06-25 RX ORDER — DILTIAZEM HYDROCHLORIDE 240 MG/1
240 CAPSULE, COATED, EXTENDED RELEASE ORAL
Qty: 30 CAPSULE | Refills: 0 | Status: SHIPPED | OUTPATIENT
Start: 2022-06-26 | End: 2022-07-11 | Stop reason: HOSPADM

## 2022-06-25 RX ORDER — SOTALOL HYDROCHLORIDE 120 MG/1
120 TABLET ORAL 2 TIMES DAILY
Qty: 60 TABLET | Refills: 0
Start: 2022-06-25 | End: 2022-07-11 | Stop reason: HOSPADM

## 2022-06-25 RX ORDER — DILTIAZEM HYDROCHLORIDE 240 MG/1
240 CAPSULE, COATED, EXTENDED RELEASE ORAL
Status: DISCONTINUED | OUTPATIENT
Start: 2022-06-25 | End: 2022-06-25 | Stop reason: HOSPADM

## 2022-06-25 RX ORDER — DILTIAZEM HYDROCHLORIDE 120 MG/1
120 CAPSULE, COATED, EXTENDED RELEASE ORAL
Status: DISCONTINUED | OUTPATIENT
Start: 2022-06-25 | End: 2022-06-25

## 2022-06-25 RX ORDER — DILTIAZEM HYDROCHLORIDE 240 MG/1
240 CAPSULE, COATED, EXTENDED RELEASE ORAL
Qty: 30 CAPSULE | Refills: 0 | Status: CANCELLED | OUTPATIENT
Start: 2022-06-25 | End: 2022-07-25

## 2022-06-25 RX ADMIN — BUTALBITAL, ACETAMINOPHEN AND CAFFEINE 1 TABLET: 50; 325; 40 TABLET ORAL at 15:22

## 2022-06-25 RX ADMIN — INSULIN LISPRO 2 UNITS: 100 INJECTION, SOLUTION INTRAVENOUS; SUBCUTANEOUS at 11:29

## 2022-06-25 RX ADMIN — DOCUSATE SODIUM 50 MG AND SENNOSIDES 8.6 MG 1 TABLET: 8.6; 5 TABLET, FILM COATED ORAL at 09:04

## 2022-06-25 RX ADMIN — DILTIAZEM HYDROCHLORIDE 30 MG: 30 TABLET, FILM COATED ORAL at 07:44

## 2022-06-25 RX ADMIN — DILTIAZEM HYDROCHLORIDE 240 MG: 240 CAPSULE, EXTENDED RELEASE ORAL at 15:14

## 2022-06-25 RX ADMIN — LISINOPRIL 40 MG: 20 TABLET ORAL at 09:04

## 2022-06-25 RX ADMIN — LEVETIRACETAM 500 MG: 500 TABLET, FILM COATED ORAL at 09:04

## 2022-06-25 RX ADMIN — HYDROCODONE BITARTRATE AND ACETAMINOPHEN 1 TABLET: 10; 325 TABLET ORAL at 11:27

## 2022-06-25 RX ADMIN — SODIUM CHLORIDE 1 G: 9 INJECTION, SOLUTION INTRAVENOUS at 01:06

## 2022-06-25 RX ADMIN — Medication 10 ML: at 09:05

## 2022-06-25 RX ADMIN — PANTOPRAZOLE SODIUM 40 MG: 40 TABLET, DELAYED RELEASE ORAL at 05:42

## 2022-06-25 RX ADMIN — GABAPENTIN 300 MG: 300 CAPSULE ORAL at 09:04

## 2022-06-25 RX ADMIN — HYDROCODONE BITARTRATE AND ACETAMINOPHEN 1 TABLET: 10; 325 TABLET ORAL at 05:42

## 2022-06-25 RX ADMIN — ATORVASTATIN CALCIUM 20 MG: 10 TABLET, FILM COATED ORAL at 01:05

## 2022-06-25 RX ADMIN — SOTALOL HYDROCHLORIDE 120 MG: 80 TABLET ORAL at 09:04

## 2022-06-25 RX ADMIN — GABAPENTIN 300 MG: 300 CAPSULE ORAL at 15:15

## 2022-06-25 RX ADMIN — DILTIAZEM HYDROCHLORIDE 30 MG: 30 TABLET, FILM COATED ORAL at 01:05

## 2022-06-25 RX ADMIN — BUTALBITAL, ACETAMINOPHEN AND CAFFEINE 1 TABLET: 50; 325; 40 TABLET ORAL at 07:45

## 2022-06-26 ENCOUNTER — READMISSION MANAGEMENT (OUTPATIENT)
Dept: CALL CENTER | Facility: HOSPITAL | Age: 67
End: 2022-06-26

## 2022-06-26 NOTE — OUTREACH NOTE
Prep Survey    Flowsheet Row Responses   [facility] facility patient discharged from? [place]   Is LACE score < 7 ? No   Emergency Room discharge w/ pulse ox? No   Eligibility Readm Mgmt   Discharge diagnosis **Atrial fibrillation with RVR    Does the patient have one of the following disease processes/diagnoses(primary or secondary)? Other   Does the patient have Home health ordered? No   Is there a DME ordered? No   Prep survey completed? Yes          HALLE SCHMIDT - Registered Nurse

## 2022-06-28 ENCOUNTER — READMISSION MANAGEMENT (OUTPATIENT)
Dept: CALL CENTER | Facility: HOSPITAL | Age: 67
End: 2022-06-28

## 2022-06-28 LAB
BACTERIA SPEC AEROBE CULT: NORMAL
BACTERIA SPEC AEROBE CULT: NORMAL

## 2022-06-28 NOTE — OUTREACH NOTE
Medical Week 1 Survey    Flowsheet Row Responses   [facility] patient discharged from? [place]   Does the patient have one of the following disease processes/diagnoses(primary or secondary)? Other   Week 1 attempt successful? Yes   Call start time 1423   Call end time 1407   Discharge diagnosis **Atrial fibrillation with RVR    Meds reviewed with patient/caregiver? Yes   Is the patient having any side effects they believe may be caused by any medication additions or changes? No   Does the patient have all medications ordered at discharge? Yes   Is the patient taking all medications as directed (includes completed medication regime)? Yes   Does the patient have a primary care provider?  Yes   Does the patient have an appointment with their PCP within 7 days of discharge? Greater than 7 days   What is preventing the patient from scheduling follow up appointments within 7 days of discharge? Earlier appointment not available   Has the patient kept scheduled appointments due by today? N/A   Psychosocial issues? No   Did the patient receive a copy of their discharge instructions? Yes   Nursing interventions Reviewed instructions with patient   What is the patient's perception of their health status since discharge? New symptoms unrelated to diagnosis   Is the patient/caregiver able to teach back signs and symptoms related to disease process for when to call PCP? Yes   Is the patient/caregiver able to teach back signs and symptoms related to disease process for when to call 911? Yes   Is the patient/caregiver able to teach back the hierarchy of who to call/visit for symptoms/problems? PCP, Specialist, Home health nurse, Urgent Care, ED, 911 Yes   Additional teach back comments States he has some facial swelling with some bumps.  Advised to contact his PCP regarding this.  Denies any soa.  Advised if he should have any trouble breathing he should go to ED.   Week 1 call completed? Yes   Wrap up additional comments Pt  to contact PCP regarding symptoms.  If symptoms worsen or has other symptoms he is to go to [facility] - Licensed Nurse

## 2022-06-30 ENCOUNTER — HOSPITAL ENCOUNTER (EMERGENCY)
Facility: HOSPITAL | Age: 67
Discharge: LEFT BEFORE DISCHARGE | End: 2022-06-30
Attending: NEUROLOGICAL SURGERY | Admitting: STUDENT IN AN ORGANIZED HEALTH CARE EDUCATION/TRAINING PROGRAM

## 2022-06-30 ENCOUNTER — HOSPITAL ENCOUNTER (INPATIENT)
Facility: HOSPITAL | Age: 67
LOS: 11 days | Discharge: NURSING, REHAB OR LONG-TERM CARE | End: 2022-07-11
Attending: NEUROLOGICAL SURGERY | Admitting: NEUROLOGICAL SURGERY

## 2022-06-30 ENCOUNTER — TELEPHONE (OUTPATIENT)
Dept: NEUROSURGERY | Facility: CLINIC | Age: 67
End: 2022-06-30

## 2022-06-30 ENCOUNTER — APPOINTMENT (OUTPATIENT)
Dept: CT IMAGING | Facility: HOSPITAL | Age: 67
End: 2022-06-30

## 2022-06-30 ENCOUNTER — APPOINTMENT (OUTPATIENT)
Dept: GENERAL RADIOLOGY | Facility: HOSPITAL | Age: 67
End: 2022-06-30

## 2022-06-30 ENCOUNTER — APPOINTMENT (OUTPATIENT)
Dept: MRI IMAGING | Facility: HOSPITAL | Age: 67
End: 2022-06-30

## 2022-06-30 VITALS
SYSTOLIC BLOOD PRESSURE: 143 MMHG | HEART RATE: 63 BPM | HEIGHT: 70 IN | DIASTOLIC BLOOD PRESSURE: 82 MMHG | WEIGHT: 272 LBS | OXYGEN SATURATION: 97 % | BODY MASS INDEX: 38.94 KG/M2 | TEMPERATURE: 98.8 F | RESPIRATION RATE: 20 BRPM

## 2022-06-30 DIAGNOSIS — Z53.29 LEFT AGAINST MEDICAL ADVICE: Primary | ICD-10-CM

## 2022-06-30 DIAGNOSIS — Z78.9 IMPAIRED MOBILITY AND ADLS: ICD-10-CM

## 2022-06-30 DIAGNOSIS — Z74.09 IMPAIRED MOBILITY AND ADLS: ICD-10-CM

## 2022-06-30 DIAGNOSIS — T81.40XA POSTOPERATIVE INFECTION, UNSPECIFIED TYPE, INITIAL ENCOUNTER: Primary | ICD-10-CM

## 2022-06-30 DIAGNOSIS — Z74.09 IMPAIRED MOBILITY: ICD-10-CM

## 2022-06-30 DIAGNOSIS — Z98.890 S/P CRANIOTOMY: ICD-10-CM

## 2022-06-30 LAB
ALBUMIN SERPL-MCNC: 3.2 G/DL (ref 3.5–5.2)
ALBUMIN/GLOB SERPL: 0.9 G/DL
ALP SERPL-CCNC: 127 U/L (ref 39–117)
ALT SERPL W P-5'-P-CCNC: 45 U/L (ref 1–41)
ANION GAP SERPL CALCULATED.3IONS-SCNC: 9 MMOL/L (ref 5–15)
APTT PPP: 29.1 SECONDS (ref 24.1–35)
AST SERPL-CCNC: 23 U/L (ref 1–40)
BACTERIA UR QL AUTO: ABNORMAL /HPF
BASOPHILS # BLD AUTO: 0.01 10*3/MM3 (ref 0–0.2)
BASOPHILS NFR BLD AUTO: 0.1 % (ref 0–1.5)
BILIRUB SERPL-MCNC: 0.3 MG/DL (ref 0–1.2)
BILIRUB UR QL STRIP: NEGATIVE
BUN SERPL-MCNC: 8 MG/DL (ref 8–23)
BUN/CREAT SERPL: 11.8 (ref 7–25)
CALCIUM SPEC-SCNC: 8.8 MG/DL (ref 8.6–10.5)
CHLORIDE SERPL-SCNC: 105 MMOL/L (ref 98–107)
CLARITY UR: ABNORMAL
CO2 SERPL-SCNC: 25 MMOL/L (ref 22–29)
COLOR UR: YELLOW
CREAT SERPL-MCNC: 0.68 MG/DL (ref 0.76–1.27)
CRP SERPL-MCNC: 5.38 MG/DL (ref 0–0.5)
D-LACTATE SERPL-SCNC: 1.3 MMOL/L (ref 0.5–2)
DEPRECATED RDW RBC AUTO: 46.2 FL (ref 37–54)
EGFRCR SERPLBLD CKD-EPI 2021: 102.5 ML/MIN/1.73
EOSINOPHIL # BLD AUTO: 0 10*3/MM3 (ref 0–0.4)
EOSINOPHIL NFR BLD AUTO: 0 % (ref 0.3–6.2)
ERYTHROCYTE [DISTWIDTH] IN BLOOD BY AUTOMATED COUNT: 13.1 % (ref 12.3–15.4)
GLOBULIN UR ELPH-MCNC: 3.7 GM/DL
GLUCOSE SERPL-MCNC: 202 MG/DL (ref 65–99)
GLUCOSE UR STRIP-MCNC: ABNORMAL MG/DL
HCT VFR BLD AUTO: 31.2 % (ref 37.5–51)
HGB BLD-MCNC: 10.3 G/DL (ref 13–17.7)
HGB UR QL STRIP.AUTO: NEGATIVE
HOLD SPECIMEN: NORMAL
HYALINE CASTS UR QL AUTO: ABNORMAL /LPF
IMM GRANULOCYTES # BLD AUTO: 0.16 10*3/MM3 (ref 0–0.05)
IMM GRANULOCYTES NFR BLD AUTO: 1.1 % (ref 0–0.5)
INR PPP: 1.07 (ref 0.91–1.09)
INR PPP: 1.16 (ref 0.91–1.09)
KETONES UR QL STRIP: NEGATIVE
LEUKOCYTE ESTERASE UR QL STRIP.AUTO: ABNORMAL
LYMPHOCYTES # BLD AUTO: 1.71 10*3/MM3 (ref 0.7–3.1)
LYMPHOCYTES NFR BLD AUTO: 12 % (ref 19.6–45.3)
MCH RBC QN AUTO: 31.8 PG (ref 26.6–33)
MCHC RBC AUTO-ENTMCNC: 33 G/DL (ref 31.5–35.7)
MCV RBC AUTO: 96.3 FL (ref 79–97)
MONOCYTES # BLD AUTO: 0.54 10*3/MM3 (ref 0.1–0.9)
MONOCYTES NFR BLD AUTO: 3.8 % (ref 5–12)
NEUTROPHILS NFR BLD AUTO: 11.86 10*3/MM3 (ref 1.7–7)
NEUTROPHILS NFR BLD AUTO: 83 % (ref 42.7–76)
NITRITE UR QL STRIP: NEGATIVE
NRBC BLD AUTO-RTO: 0 /100 WBC (ref 0–0.2)
PH UR STRIP.AUTO: 5.5 [PH] (ref 5–8)
PLATELET # BLD AUTO: 282 10*3/MM3 (ref 140–450)
PMV BLD AUTO: 10 FL (ref 6–12)
POTASSIUM SERPL-SCNC: 4.5 MMOL/L (ref 3.5–5.2)
PROT SERPL-MCNC: 6.9 G/DL (ref 6–8.5)
PROT UR QL STRIP: ABNORMAL
PROTHROMBIN TIME: 13.5 SECONDS (ref 11.9–14.6)
PROTHROMBIN TIME: 14.3 SECONDS (ref 11.9–14.6)
QT INTERVAL: 412 MS
QTC INTERVAL: 444 MS
RBC # BLD AUTO: 3.24 10*6/MM3 (ref 4.14–5.8)
RBC # UR STRIP: ABNORMAL /HPF
REF LAB TEST METHOD: ABNORMAL
SARS-COV-2 RDRP RESP QL NAA+PROBE: NORMAL
SODIUM SERPL-SCNC: 139 MMOL/L (ref 136–145)
SP GR UR STRIP: 1.02 (ref 1–1.03)
SQUAMOUS #/AREA URNS HPF: ABNORMAL /HPF
UROBILINOGEN UR QL STRIP: ABNORMAL
WBC # UR STRIP: ABNORMAL /HPF
WBC NRBC COR # BLD: 14.28 10*3/MM3 (ref 3.4–10.8)
WHOLE BLOOD HOLD COAG: NORMAL
WHOLE BLOOD HOLD SPECIMEN: NORMAL

## 2022-06-30 PROCEDURE — 81001 URINALYSIS AUTO W/SCOPE: CPT | Performed by: NURSE PRACTITIONER

## 2022-06-30 PROCEDURE — 96375 TX/PRO/DX INJ NEW DRUG ADDON: CPT

## 2022-06-30 PROCEDURE — 85025 COMPLETE CBC W/AUTO DIFF WBC: CPT | Performed by: NURSE PRACTITIONER

## 2022-06-30 PROCEDURE — 0 HYDROMORPHONE 1 MG/ML SOLUTION: Performed by: NURSE PRACTITIONER

## 2022-06-30 PROCEDURE — 25010000002 ONDANSETRON PER 1 MG: Performed by: NURSE PRACTITIONER

## 2022-06-30 PROCEDURE — 99283 EMERGENCY DEPT VISIT LOW MDM: CPT

## 2022-06-30 PROCEDURE — A9577 INJ MULTIHANCE: HCPCS | Performed by: NURSE PRACTITIONER

## 2022-06-30 PROCEDURE — 70450 CT HEAD/BRAIN W/O DYE: CPT

## 2022-06-30 PROCEDURE — 96374 THER/PROPH/DIAG INJ IV PUSH: CPT

## 2022-06-30 PROCEDURE — 25010000002 CEFEPIME PER 500 MG: Performed by: NURSE PRACTITIONER

## 2022-06-30 PROCEDURE — 87040 BLOOD CULTURE FOR BACTERIA: CPT | Performed by: NURSE PRACTITIONER

## 2022-06-30 PROCEDURE — 71045 X-RAY EXAM CHEST 1 VIEW: CPT

## 2022-06-30 PROCEDURE — 87205 SMEAR GRAM STAIN: CPT | Performed by: NEUROLOGICAL SURGERY

## 2022-06-30 PROCEDURE — 0 GADOBENATE DIMEGLUMINE 529 MG/ML SOLUTION: Performed by: NURSE PRACTITIONER

## 2022-06-30 PROCEDURE — 99284 EMERGENCY DEPT VISIT MOD MDM: CPT

## 2022-06-30 PROCEDURE — 83605 ASSAY OF LACTIC ACID: CPT | Performed by: NURSE PRACTITIONER

## 2022-06-30 PROCEDURE — 25010000002 VANCOMYCIN 1 G RECONSTITUTED SOLUTION 1 EACH VIAL: Performed by: NEUROLOGICAL SURGERY

## 2022-06-30 PROCEDURE — 87186 SC STD MICRODIL/AGAR DIL: CPT | Performed by: NEUROLOGICAL SURGERY

## 2022-06-30 PROCEDURE — 85730 THROMBOPLASTIN TIME PARTIAL: CPT | Performed by: NURSE PRACTITIONER

## 2022-06-30 PROCEDURE — 87086 URINE CULTURE/COLONY COUNT: CPT | Performed by: NURSE PRACTITIONER

## 2022-06-30 PROCEDURE — 99024 POSTOP FOLLOW-UP VISIT: CPT | Performed by: NURSE PRACTITIONER

## 2022-06-30 PROCEDURE — 87070 CULTURE OTHR SPECIMN AEROBIC: CPT | Performed by: NEUROLOGICAL SURGERY

## 2022-06-30 PROCEDURE — 85610 PROTHROMBIN TIME: CPT | Performed by: NURSE PRACTITIONER

## 2022-06-30 PROCEDURE — 80053 COMPREHEN METABOLIC PANEL: CPT | Performed by: NURSE PRACTITIONER

## 2022-06-30 PROCEDURE — 70553 MRI BRAIN STEM W/O & W/DYE: CPT

## 2022-06-30 PROCEDURE — 93005 ELECTROCARDIOGRAM TRACING: CPT | Performed by: NURSE PRACTITIONER

## 2022-06-30 PROCEDURE — 36415 COLL VENOUS BLD VENIPUNCTURE: CPT

## 2022-06-30 PROCEDURE — 87077 CULTURE AEROBIC IDENTIFY: CPT | Performed by: NEUROLOGICAL SURGERY

## 2022-06-30 PROCEDURE — 86140 C-REACTIVE PROTEIN: CPT | Performed by: NURSE PRACTITIONER

## 2022-06-30 PROCEDURE — 87635 SARS-COV-2 COVID-19 AMP PRB: CPT | Performed by: NURSE PRACTITIONER

## 2022-06-30 PROCEDURE — 96376 TX/PRO/DX INJ SAME DRUG ADON: CPT

## 2022-06-30 RX ORDER — ONDANSETRON 4 MG/1
4 TABLET, FILM COATED ORAL EVERY 6 HOURS PRN
Status: DISCONTINUED | OUTPATIENT
Start: 2022-06-30 | End: 2022-07-11 | Stop reason: HOSPADM

## 2022-06-30 RX ORDER — INSULIN LISPRO 100 [IU]/ML
3-14 INJECTION, SOLUTION INTRAVENOUS; SUBCUTANEOUS
Status: DISCONTINUED | OUTPATIENT
Start: 2022-07-01 | End: 2022-07-05

## 2022-06-30 RX ORDER — OXYCODONE AND ACETAMINOPHEN 7.5; 325 MG/1; MG/1
1 TABLET ORAL EVERY 4 HOURS PRN
Status: DISPENSED | OUTPATIENT
Start: 2022-06-30 | End: 2022-07-10

## 2022-06-30 RX ORDER — LEVETIRACETAM 500 MG/1
500 TABLET ORAL 2 TIMES DAILY
Status: DISCONTINUED | OUTPATIENT
Start: 2022-06-30 | End: 2022-07-11 | Stop reason: HOSPADM

## 2022-06-30 RX ORDER — ACETAMINOPHEN 160 MG/5ML
650 SOLUTION ORAL EVERY 4 HOURS PRN
Status: DISCONTINUED | OUTPATIENT
Start: 2022-06-30 | End: 2022-07-11 | Stop reason: HOSPADM

## 2022-06-30 RX ORDER — ACETAMINOPHEN 325 MG/1
650 TABLET ORAL EVERY 4 HOURS PRN
Status: DISCONTINUED | OUTPATIENT
Start: 2022-06-30 | End: 2022-07-11 | Stop reason: HOSPADM

## 2022-06-30 RX ORDER — ONDANSETRON 2 MG/ML
4 INJECTION INTRAMUSCULAR; INTRAVENOUS ONCE
Status: COMPLETED | OUTPATIENT
Start: 2022-06-30 | End: 2022-06-30

## 2022-06-30 RX ORDER — ACETAMINOPHEN 650 MG/1
650 SUPPOSITORY RECTAL EVERY 4 HOURS PRN
Status: DISCONTINUED | OUTPATIENT
Start: 2022-06-30 | End: 2022-07-11 | Stop reason: HOSPADM

## 2022-06-30 RX ORDER — SODIUM CHLORIDE 0.9 % (FLUSH) 0.9 %
10 SYRINGE (ML) INJECTION EVERY 12 HOURS SCHEDULED
Status: DISCONTINUED | OUTPATIENT
Start: 2022-06-30 | End: 2022-07-11 | Stop reason: HOSPADM

## 2022-06-30 RX ORDER — BISACODYL 5 MG/1
5 TABLET, DELAYED RELEASE ORAL DAILY PRN
Status: DISCONTINUED | OUTPATIENT
Start: 2022-06-30 | End: 2022-07-11 | Stop reason: HOSPADM

## 2022-06-30 RX ORDER — DEXTROSE MONOHYDRATE 25 G/50ML
25 INJECTION, SOLUTION INTRAVENOUS
Status: DISCONTINUED | OUTPATIENT
Start: 2022-06-30 | End: 2022-07-05 | Stop reason: SDUPTHER

## 2022-06-30 RX ORDER — AMOXICILLIN 250 MG
2 CAPSULE ORAL 2 TIMES DAILY
Status: DISCONTINUED | OUTPATIENT
Start: 2022-06-30 | End: 2022-07-11 | Stop reason: HOSPADM

## 2022-06-30 RX ORDER — GABAPENTIN 300 MG/1
300 CAPSULE ORAL 3 TIMES DAILY
Status: DISCONTINUED | OUTPATIENT
Start: 2022-06-30 | End: 2022-07-11 | Stop reason: HOSPADM

## 2022-06-30 RX ORDER — FAMOTIDINE 20 MG/1
40 TABLET, FILM COATED ORAL DAILY
Status: DISCONTINUED | OUTPATIENT
Start: 2022-07-01 | End: 2022-07-11 | Stop reason: HOSPADM

## 2022-06-30 RX ORDER — SODIUM CHLORIDE 0.9 % (FLUSH) 0.9 %
10 SYRINGE (ML) INJECTION AS NEEDED
Status: DISCONTINUED | OUTPATIENT
Start: 2022-06-30 | End: 2022-07-11 | Stop reason: HOSPADM

## 2022-06-30 RX ORDER — LISINOPRIL 20 MG/1
40 TABLET ORAL DAILY
Status: DISCONTINUED | OUTPATIENT
Start: 2022-07-01 | End: 2022-07-11 | Stop reason: HOSPADM

## 2022-06-30 RX ORDER — ATORVASTATIN CALCIUM 10 MG/1
20 TABLET, FILM COATED ORAL NIGHTLY
Status: DISCONTINUED | OUTPATIENT
Start: 2022-06-30 | End: 2022-07-11 | Stop reason: HOSPADM

## 2022-06-30 RX ORDER — BISACODYL 10 MG
10 SUPPOSITORY, RECTAL RECTAL DAILY PRN
Status: DISCONTINUED | OUTPATIENT
Start: 2022-06-30 | End: 2022-07-11 | Stop reason: HOSPADM

## 2022-06-30 RX ORDER — OXYCODONE AND ACETAMINOPHEN 10; 325 MG/1; MG/1
1 TABLET ORAL EVERY 4 HOURS PRN
Status: DISPENSED | OUTPATIENT
Start: 2022-06-30 | End: 2022-07-10

## 2022-06-30 RX ORDER — POLYETHYLENE GLYCOL 3350 17 G/17G
17 POWDER, FOR SOLUTION ORAL DAILY PRN
Status: DISCONTINUED | OUTPATIENT
Start: 2022-06-30 | End: 2022-07-11 | Stop reason: HOSPADM

## 2022-06-30 RX ORDER — ONDANSETRON 2 MG/ML
4 INJECTION INTRAMUSCULAR; INTRAVENOUS EVERY 6 HOURS PRN
Status: DISCONTINUED | OUTPATIENT
Start: 2022-06-30 | End: 2022-07-11 | Stop reason: HOSPADM

## 2022-06-30 RX ORDER — DILTIAZEM HYDROCHLORIDE 240 MG/1
240 CAPSULE, COATED, EXTENDED RELEASE ORAL
Status: DISCONTINUED | OUTPATIENT
Start: 2022-07-01 | End: 2022-07-08

## 2022-06-30 RX ORDER — LABETALOL HYDROCHLORIDE 5 MG/ML
10 INJECTION, SOLUTION INTRAVENOUS EVERY 6 HOURS PRN
Status: DISCONTINUED | OUTPATIENT
Start: 2022-06-30 | End: 2022-07-03

## 2022-06-30 RX ORDER — NICOTINE POLACRILEX 4 MG
15 LOZENGE BUCCAL
Status: DISCONTINUED | OUTPATIENT
Start: 2022-06-30 | End: 2022-07-05 | Stop reason: SDUPTHER

## 2022-06-30 RX ORDER — SOTALOL HYDROCHLORIDE 80 MG/1
120 TABLET ORAL 2 TIMES DAILY
Status: DISCONTINUED | OUTPATIENT
Start: 2022-06-30 | End: 2022-07-09

## 2022-06-30 RX ADMIN — DOCUSATE SODIUM 50 MG AND SENNOSIDES 8.6 MG 2 TABLET: 8.6; 5 TABLET, FILM COATED ORAL at 22:57

## 2022-06-30 RX ADMIN — ONDANSETRON 4 MG: 2 INJECTION INTRAMUSCULAR; INTRAVENOUS at 20:40

## 2022-06-30 RX ADMIN — ATORVASTATIN CALCIUM 20 MG: 10 TABLET, FILM COATED ORAL at 22:58

## 2022-06-30 RX ADMIN — GABAPENTIN 300 MG: 300 CAPSULE ORAL at 23:01

## 2022-06-30 RX ADMIN — SOTALOL HYDROCHLORIDE 120 MG: 80 TABLET ORAL at 22:57

## 2022-06-30 RX ADMIN — LEVETIRACETAM 500 MG: 500 TABLET, FILM COATED ORAL at 22:57

## 2022-06-30 RX ADMIN — VANCOMYCIN HYDROCHLORIDE 2500 MG: 1 INJECTION, POWDER, LYOPHILIZED, FOR SOLUTION INTRAVENOUS at 23:28

## 2022-06-30 RX ADMIN — HYDROMORPHONE HYDROCHLORIDE 1 MG: 1 INJECTION, SOLUTION INTRAMUSCULAR; INTRAVENOUS; SUBCUTANEOUS at 12:29

## 2022-06-30 RX ADMIN — HYDROMORPHONE HYDROCHLORIDE 1 MG: 1 INJECTION, SOLUTION INTRAMUSCULAR; INTRAVENOUS; SUBCUTANEOUS at 15:48

## 2022-06-30 RX ADMIN — HYDROMORPHONE HYDROCHLORIDE 1 MG: 1 INJECTION, SOLUTION INTRAMUSCULAR; INTRAVENOUS; SUBCUTANEOUS at 20:40

## 2022-06-30 RX ADMIN — GADOBENATE DIMEGLUMINE 20 ML: 529 INJECTION, SOLUTION INTRAVENOUS at 14:25

## 2022-06-30 RX ADMIN — ONDANSETRON 4 MG: 2 INJECTION INTRAMUSCULAR; INTRAVENOUS at 12:27

## 2022-06-30 RX ADMIN — ONDANSETRON HYDROCHLORIDE 4 MG: 2 SOLUTION INTRAMUSCULAR; INTRAVENOUS at 15:50

## 2022-06-30 RX ADMIN — CEFEPIME HYDROCHLORIDE 2 G: 2 INJECTION, POWDER, FOR SOLUTION INTRAVENOUS at 22:53

## 2022-06-30 NOTE — TELEPHONE ENCOUNTER
We received a phone call yesterday afternoon from Geeta BURGESS reporting Mr. Escobar presented to their clinic with an increase in right temporal swelling and an unsteady gait.  Given her concern and the need for stat imaging I recommended he proceed to [facility] ED for further evaluation.  Unfortunately, it appears that Mr. Escobar decline said recommendation as there are no records from an ED encounter.  I have personally made several attempts to contact Mr. Escobar, however I am unable to reach him or leave a message.  In the event he returns our phone call, I would like him to come to the neurosurgical office for evaluation today, 6/30/2022.      ADITYA Cespedes 6/30/2022 09:36 CDT

## 2022-07-01 ENCOUNTER — ANESTHESIA EVENT (OUTPATIENT)
Dept: PERIOP | Facility: HOSPITAL | Age: 67
End: 2022-07-01

## 2022-07-01 ENCOUNTER — READMISSION MANAGEMENT (OUTPATIENT)
Dept: CALL CENTER | Facility: HOSPITAL | Age: 67
End: 2022-07-01

## 2022-07-01 ENCOUNTER — ANESTHESIA (OUTPATIENT)
Dept: PERIOP | Facility: HOSPITAL | Age: 67
End: 2022-07-01

## 2022-07-01 LAB
ALBUMIN SERPL-MCNC: 2.9 G/DL (ref 3.5–5.2)
ALBUMIN/GLOB SERPL: 0.9 G/DL
ALP SERPL-CCNC: 109 U/L (ref 39–117)
ALT SERPL W P-5'-P-CCNC: 37 U/L (ref 1–41)
ANION GAP SERPL CALCULATED.3IONS-SCNC: 7 MMOL/L (ref 5–15)
AST SERPL-CCNC: 19 U/L (ref 1–40)
BASOPHILS # BLD AUTO: 0.03 10*3/MM3 (ref 0–0.2)
BASOPHILS NFR BLD AUTO: 0.3 % (ref 0–1.5)
BILIRUB SERPL-MCNC: 0.2 MG/DL (ref 0–1.2)
BUN SERPL-MCNC: 8 MG/DL (ref 8–23)
BUN/CREAT SERPL: 10.4 (ref 7–25)
CALCIUM SPEC-SCNC: 8.8 MG/DL (ref 8.6–10.5)
CHLORIDE SERPL-SCNC: 106 MMOL/L (ref 98–107)
CO2 SERPL-SCNC: 28 MMOL/L (ref 22–29)
CREAT SERPL-MCNC: 0.77 MG/DL (ref 0.76–1.27)
DEPRECATED RDW RBC AUTO: 45.9 FL (ref 37–54)
EGFRCR SERPLBLD CKD-EPI 2021: 98.7 ML/MIN/1.73
EOSINOPHIL # BLD AUTO: 0.03 10*3/MM3 (ref 0–0.4)
EOSINOPHIL NFR BLD AUTO: 0.3 % (ref 0.3–6.2)
ERYTHROCYTE [DISTWIDTH] IN BLOOD BY AUTOMATED COUNT: 12.9 % (ref 12.3–15.4)
GLOBULIN UR ELPH-MCNC: 3.3 GM/DL
GLUCOSE BLDC GLUCOMTR-MCNC: 101 MG/DL (ref 70–130)
GLUCOSE BLDC GLUCOMTR-MCNC: 76 MG/DL (ref 70–130)
GLUCOSE BLDC GLUCOMTR-MCNC: 87 MG/DL (ref 70–130)
GLUCOSE BLDC GLUCOMTR-MCNC: 91 MG/DL (ref 70–130)
GLUCOSE SERPL-MCNC: 140 MG/DL (ref 65–99)
HCT VFR BLD AUTO: 30.5 % (ref 37.5–51)
HGB BLD-MCNC: 9.4 G/DL (ref 13–17.7)
IMM GRANULOCYTES # BLD AUTO: 0.08 10*3/MM3 (ref 0–0.05)
IMM GRANULOCYTES NFR BLD AUTO: 0.8 % (ref 0–0.5)
LYMPHOCYTES # BLD AUTO: 2.44 10*3/MM3 (ref 0.7–3.1)
LYMPHOCYTES NFR BLD AUTO: 23.7 % (ref 19.6–45.3)
MCH RBC QN AUTO: 30.4 PG (ref 26.6–33)
MCHC RBC AUTO-ENTMCNC: 30.8 G/DL (ref 31.5–35.7)
MCV RBC AUTO: 98.7 FL (ref 79–97)
MONOCYTES # BLD AUTO: 0.55 10*3/MM3 (ref 0.1–0.9)
MONOCYTES NFR BLD AUTO: 5.4 % (ref 5–12)
NEUTROPHILS NFR BLD AUTO: 69.5 % (ref 42.7–76)
NEUTROPHILS NFR BLD AUTO: 7.15 10*3/MM3 (ref 1.7–7)
NRBC BLD AUTO-RTO: 0 /100 WBC (ref 0–0.2)
PLATELET # BLD AUTO: 280 10*3/MM3 (ref 140–450)
PMV BLD AUTO: 9.9 FL (ref 6–12)
POTASSIUM SERPL-SCNC: 3.9 MMOL/L (ref 3.5–5.2)
PROT SERPL-MCNC: 6.2 G/DL (ref 6–8.5)
RBC # BLD AUTO: 3.09 10*6/MM3 (ref 4.14–5.8)
SODIUM SERPL-SCNC: 141 MMOL/L (ref 136–145)
WBC NRBC COR # BLD: 10.28 10*3/MM3 (ref 3.4–10.8)

## 2022-07-01 PROCEDURE — 87205 SMEAR GRAM STAIN: CPT | Performed by: NEUROLOGICAL SURGERY

## 2022-07-01 PROCEDURE — 87186 SC STD MICRODIL/AGAR DIL: CPT | Performed by: NEUROLOGICAL SURGERY

## 2022-07-01 PROCEDURE — 25010000002 VANCOMYCIN 10 G RECONSTITUTED SOLUTION: Performed by: NEUROLOGICAL SURGERY

## 2022-07-01 PROCEDURE — 85025 COMPLETE CBC W/AUTO DIFF WBC: CPT | Performed by: NURSE PRACTITIONER

## 2022-07-01 PROCEDURE — 25010000002 CEFEPIME PER 500 MG: Performed by: NURSE PRACTITIONER

## 2022-07-01 PROCEDURE — 62142 RMVL B1 FLP/PROSTC PLATE SKL: CPT | Performed by: NEUROLOGICAL SURGERY

## 2022-07-01 PROCEDURE — 87176 TISSUE HOMOGENIZATION CULTR: CPT | Performed by: NEUROLOGICAL SURGERY

## 2022-07-01 PROCEDURE — 25010000002 FENTANYL CITRATE (PF) 100 MCG/2ML SOLUTION: Performed by: NURSE ANESTHETIST, CERTIFIED REGISTERED

## 2022-07-01 PROCEDURE — 25010000002 PROPOFOL 10 MG/ML EMULSION: Performed by: NURSE ANESTHETIST, CERTIFIED REGISTERED

## 2022-07-01 PROCEDURE — 0NB00ZZ EXCISION OF SKULL, OPEN APPROACH: ICD-10-PCS | Performed by: NEUROLOGICAL SURGERY

## 2022-07-01 PROCEDURE — 87070 CULTURE OTHR SPECIMN AEROBIC: CPT | Performed by: NEUROLOGICAL SURGERY

## 2022-07-01 PROCEDURE — 87075 CULTR BACTERIA EXCEPT BLOOD: CPT | Performed by: NEUROLOGICAL SURGERY

## 2022-07-01 PROCEDURE — 25010000002 HYDROMORPHONE 1 MG/ML SOLUTION: Performed by: NURSE ANESTHETIST, CERTIFIED REGISTERED

## 2022-07-01 PROCEDURE — 3E1Q38Z IRRIGATION OF CRANIAL CAVITY AND BRAIN USING IRRIGATING SUBSTANCE, PERCUTANEOUS APPROACH: ICD-10-PCS | Performed by: NEUROLOGICAL SURGERY

## 2022-07-01 PROCEDURE — 80053 COMPREHEN METABOLIC PANEL: CPT | Performed by: NURSE PRACTITIONER

## 2022-07-01 PROCEDURE — 25010000002 HYDROMORPHONE PER 4 MG: Performed by: ANESTHESIOLOGY

## 2022-07-01 PROCEDURE — S0260 H&P FOR SURGERY: HCPCS | Performed by: NEUROLOGICAL SURGERY

## 2022-07-01 PROCEDURE — 99024 POSTOP FOLLOW-UP VISIT: CPT | Performed by: NURSE PRACTITIONER

## 2022-07-01 PROCEDURE — 87077 CULTURE AEROBIC IDENTIFY: CPT | Performed by: NEUROLOGICAL SURGERY

## 2022-07-01 PROCEDURE — 25010000002 CEFEPIME PER 500 MG: Performed by: NEUROLOGICAL SURGERY

## 2022-07-01 PROCEDURE — 25010000002 ONDANSETRON PER 1 MG: Performed by: NURSE ANESTHETIST, CERTIFIED REGISTERED

## 2022-07-01 PROCEDURE — 25010000002 CEFAZOLIN PER 500 MG: Performed by: NEUROLOGICAL SURGERY

## 2022-07-01 PROCEDURE — 82962 GLUCOSE BLOOD TEST: CPT

## 2022-07-01 DEVICE — KT HEMOST ABS SURGIFOAM PORCN 1GRAM: Type: IMPLANTABLE DEVICE | Site: CRANIAL | Status: FUNCTIONAL

## 2022-07-01 DEVICE — HEMOST ABS SURGIFOAM SZ100 8X12 10MM: Type: IMPLANTABLE DEVICE | Site: CRANIAL | Status: FUNCTIONAL

## 2022-07-01 RX ORDER — INSULIN GLARGINE 100 [IU]/ML
65 INJECTION, SOLUTION SUBCUTANEOUS NIGHTLY
COMMUNITY
End: 2022-07-11 | Stop reason: HOSPADM

## 2022-07-01 RX ORDER — ROCURONIUM BROMIDE 10 MG/ML
INJECTION, SOLUTION INTRAVENOUS AS NEEDED
Status: DISCONTINUED | OUTPATIENT
Start: 2022-07-01 | End: 2022-07-01 | Stop reason: SURG

## 2022-07-01 RX ORDER — SODIUM CHLORIDE, SODIUM LACTATE, POTASSIUM CHLORIDE, CALCIUM CHLORIDE 600; 310; 30; 20 MG/100ML; MG/100ML; MG/100ML; MG/100ML
INJECTION, SOLUTION INTRAVENOUS CONTINUOUS PRN
Status: DISCONTINUED | OUTPATIENT
Start: 2022-07-01 | End: 2022-07-01 | Stop reason: SURG

## 2022-07-01 RX ORDER — BUPIVACAINE HYDROCHLORIDE AND EPINEPHRINE 2.5; 5 MG/ML; UG/ML
INJECTION, SOLUTION INFILTRATION; PERINEURAL AS NEEDED
Status: DISCONTINUED | OUTPATIENT
Start: 2022-07-01 | End: 2022-07-01 | Stop reason: HOSPADM

## 2022-07-01 RX ORDER — LIDOCAINE HYDROCHLORIDE 20 MG/ML
INJECTION, SOLUTION EPIDURAL; INFILTRATION; INTRACAUDAL; PERINEURAL AS NEEDED
Status: DISCONTINUED | OUTPATIENT
Start: 2022-07-01 | End: 2022-07-01 | Stop reason: SURG

## 2022-07-01 RX ORDER — NEOSTIGMINE METHYLSULFATE 5 MG/5 ML
SYRINGE (ML) INTRAVENOUS AS NEEDED
Status: DISCONTINUED | OUTPATIENT
Start: 2022-07-01 | End: 2022-07-01 | Stop reason: SURG

## 2022-07-01 RX ORDER — FENTANYL CITRATE 50 UG/ML
25 INJECTION, SOLUTION INTRAMUSCULAR; INTRAVENOUS
Status: DISCONTINUED | OUTPATIENT
Start: 2022-07-01 | End: 2022-07-01 | Stop reason: HOSPADM

## 2022-07-01 RX ORDER — NALOXONE HCL 0.4 MG/ML
0.04 VIAL (ML) INJECTION AS NEEDED
Status: DISCONTINUED | OUTPATIENT
Start: 2022-07-01 | End: 2022-07-01 | Stop reason: HOSPADM

## 2022-07-01 RX ORDER — HYDROMORPHONE HYDROCHLORIDE 1 MG/ML
0.5 INJECTION, SOLUTION INTRAMUSCULAR; INTRAVENOUS; SUBCUTANEOUS
Status: DISCONTINUED | OUTPATIENT
Start: 2022-07-01 | End: 2022-07-01 | Stop reason: HOSPADM

## 2022-07-01 RX ORDER — DROPERIDOL 2.5 MG/ML
0.62 INJECTION, SOLUTION INTRAMUSCULAR; INTRAVENOUS ONCE AS NEEDED
Status: DISCONTINUED | OUTPATIENT
Start: 2022-07-01 | End: 2022-07-01 | Stop reason: HOSPADM

## 2022-07-01 RX ORDER — IBUPROFEN 200 MG
200 TABLET ORAL 2 TIMES DAILY PRN
COMMUNITY
End: 2022-07-11 | Stop reason: HOSPADM

## 2022-07-01 RX ORDER — PROPOFOL 10 MG/ML
VIAL (ML) INTRAVENOUS AS NEEDED
Status: DISCONTINUED | OUTPATIENT
Start: 2022-07-01 | End: 2022-07-01 | Stop reason: SURG

## 2022-07-01 RX ORDER — SODIUM CHLORIDE 0.9 % (FLUSH) 0.9 %
3 SYRINGE (ML) INJECTION EVERY 12 HOURS SCHEDULED
Status: DISCONTINUED | OUTPATIENT
Start: 2022-07-01 | End: 2022-07-01 | Stop reason: HOSPADM

## 2022-07-01 RX ORDER — EPHEDRINE SULFATE 50 MG/ML
INJECTION, SOLUTION INTRAVENOUS AS NEEDED
Status: DISCONTINUED | OUTPATIENT
Start: 2022-07-01 | End: 2022-07-01 | Stop reason: SURG

## 2022-07-01 RX ORDER — LIDOCAINE HYDROCHLORIDE 10 MG/ML
0.5 INJECTION, SOLUTION EPIDURAL; INFILTRATION; INTRACAUDAL; PERINEURAL ONCE AS NEEDED
Status: DISCONTINUED | OUTPATIENT
Start: 2022-07-01 | End: 2022-07-01 | Stop reason: HOSPADM

## 2022-07-01 RX ORDER — FENTANYL CITRATE 50 UG/ML
INJECTION, SOLUTION INTRAMUSCULAR; INTRAVENOUS AS NEEDED
Status: DISCONTINUED | OUTPATIENT
Start: 2022-07-01 | End: 2022-07-01 | Stop reason: SURG

## 2022-07-01 RX ORDER — SODIUM CHLORIDE 0.9 % (FLUSH) 0.9 %
3-10 SYRINGE (ML) INJECTION AS NEEDED
Status: DISCONTINUED | OUTPATIENT
Start: 2022-07-01 | End: 2022-07-01 | Stop reason: HOSPADM

## 2022-07-01 RX ORDER — HYDROCODONE BITARTRATE AND ACETAMINOPHEN 7.5; 325 MG/1; MG/1
1 TABLET ORAL EVERY 6 HOURS PRN
Status: ON HOLD | COMMUNITY
End: 2022-10-07 | Stop reason: SDUPTHER

## 2022-07-01 RX ORDER — LABETALOL HYDROCHLORIDE 5 MG/ML
5 INJECTION, SOLUTION INTRAVENOUS
Status: DISCONTINUED | OUTPATIENT
Start: 2022-07-01 | End: 2022-07-01 | Stop reason: HOSPADM

## 2022-07-01 RX ORDER — SODIUM CHLORIDE, SODIUM LACTATE, POTASSIUM CHLORIDE, CALCIUM CHLORIDE 600; 310; 30; 20 MG/100ML; MG/100ML; MG/100ML; MG/100ML
100 INJECTION, SOLUTION INTRAVENOUS CONTINUOUS
Status: DISCONTINUED | OUTPATIENT
Start: 2022-07-01 | End: 2022-07-03

## 2022-07-01 RX ORDER — PHENYLEPHRINE HCL IN 0.9% NACL 1 MG/10 ML
SYRINGE (ML) INTRAVENOUS AS NEEDED
Status: DISCONTINUED | OUTPATIENT
Start: 2022-07-01 | End: 2022-07-01 | Stop reason: SURG

## 2022-07-01 RX ORDER — ACETAMINOPHEN 500 MG
1000 TABLET ORAL ONCE
Status: DISCONTINUED | OUTPATIENT
Start: 2022-07-01 | End: 2022-07-01 | Stop reason: HOSPADM

## 2022-07-01 RX ORDER — SODIUM CHLORIDE 9 MG/ML
INJECTION, SOLUTION INTRAVENOUS AS NEEDED
Status: DISCONTINUED | OUTPATIENT
Start: 2022-07-01 | End: 2022-07-01 | Stop reason: HOSPADM

## 2022-07-01 RX ORDER — ONDANSETRON 2 MG/ML
4 INJECTION INTRAMUSCULAR; INTRAVENOUS
Status: DISCONTINUED | OUTPATIENT
Start: 2022-07-01 | End: 2022-07-01 | Stop reason: HOSPADM

## 2022-07-01 RX ORDER — ONDANSETRON 2 MG/ML
INJECTION INTRAMUSCULAR; INTRAVENOUS AS NEEDED
Status: DISCONTINUED | OUTPATIENT
Start: 2022-07-01 | End: 2022-07-01 | Stop reason: SURG

## 2022-07-01 RX ORDER — BUTALBITAL, ACETAMINOPHEN AND CAFFEINE 50; 325; 40 MG/1; MG/1; MG/1
1 TABLET ORAL EVERY 6 HOURS PRN
Status: ON HOLD | COMMUNITY
End: 2023-03-31

## 2022-07-01 RX ORDER — LIDOCAINE HYDROCHLORIDE 40 MG/ML
SOLUTION TOPICAL AS NEEDED
Status: DISCONTINUED | OUTPATIENT
Start: 2022-07-01 | End: 2022-07-01 | Stop reason: SURG

## 2022-07-01 RX ORDER — OXYCODONE AND ACETAMINOPHEN 10; 325 MG/1; MG/1
1 TABLET ORAL ONCE AS NEEDED
Status: DISCONTINUED | OUTPATIENT
Start: 2022-07-01 | End: 2022-07-01 | Stop reason: HOSPADM

## 2022-07-01 RX ORDER — BUPIVACAINE HCL/0.9 % NACL/PF 0.1 %
2 PLASTIC BAG, INJECTION (ML) EPIDURAL ONCE
Status: DISCONTINUED | OUTPATIENT
Start: 2022-07-01 | End: 2022-07-01 | Stop reason: HOSPADM

## 2022-07-01 RX ORDER — FLUMAZENIL 0.1 MG/ML
0.2 INJECTION INTRAVENOUS AS NEEDED
Status: DISCONTINUED | OUTPATIENT
Start: 2022-07-01 | End: 2022-07-01 | Stop reason: HOSPADM

## 2022-07-01 RX ADMIN — DOCUSATE SODIUM 50 MG AND SENNOSIDES 8.6 MG 2 TABLET: 8.6; 5 TABLET, FILM COATED ORAL at 21:49

## 2022-07-01 RX ADMIN — EPHEDRINE SULFATE 15 MG: 50 INJECTION INTRAVENOUS at 17:30

## 2022-07-01 RX ADMIN — VANCOMYCIN HYDROCHLORIDE 1250 MG: 10 INJECTION, POWDER, LYOPHILIZED, FOR SOLUTION INTRAVENOUS at 22:24

## 2022-07-01 RX ADMIN — DOCUSATE SODIUM 50 MG AND SENNOSIDES 8.6 MG 2 TABLET: 8.6; 5 TABLET, FILM COATED ORAL at 08:23

## 2022-07-01 RX ADMIN — FENTANYL CITRATE 100 MCG: 50 INJECTION INTRAMUSCULAR; INTRAVENOUS at 17:45

## 2022-07-01 RX ADMIN — SODIUM CHLORIDE, POTASSIUM CHLORIDE, SODIUM LACTATE AND CALCIUM CHLORIDE 100 ML/HR: 600; 310; 30; 20 INJECTION, SOLUTION INTRAVENOUS at 21:49

## 2022-07-01 RX ADMIN — EPHEDRINE SULFATE 15 MG: 50 INJECTION INTRAVENOUS at 17:34

## 2022-07-01 RX ADMIN — LABETALOL HYDROCHLORIDE 10 MG: 5 INJECTION, SOLUTION INTRAVENOUS at 20:18

## 2022-07-01 RX ADMIN — ONDANSETRON 4 MG: 2 INJECTION INTRAMUSCULAR; INTRAVENOUS at 18:02

## 2022-07-01 RX ADMIN — LISINOPRIL 40 MG: 20 TABLET ORAL at 08:23

## 2022-07-01 RX ADMIN — Medication 100 MCG: at 17:25

## 2022-07-01 RX ADMIN — SODIUM CHLORIDE, POTASSIUM CHLORIDE, SODIUM LACTATE AND CALCIUM CHLORIDE: 600; 310; 30; 20 INJECTION, SOLUTION INTRAVENOUS at 17:12

## 2022-07-01 RX ADMIN — OXYCODONE HYDROCHLORIDE AND ACETAMINOPHEN 1 TABLET: 7.5; 325 TABLET ORAL at 01:15

## 2022-07-01 RX ADMIN — GLYCOPYRROLATE 0.4 MG: 0.2 INJECTION INTRAMUSCULAR; INTRAVENOUS at 18:07

## 2022-07-01 RX ADMIN — VANCOMYCIN HYDROCHLORIDE 1250 MG: 10 INJECTION, POWDER, LYOPHILIZED, FOR SOLUTION INTRAVENOUS at 09:38

## 2022-07-01 RX ADMIN — CEFEPIME HYDROCHLORIDE 2 G: 2 INJECTION, POWDER, FOR SOLUTION INTRAVENOUS at 05:42

## 2022-07-01 RX ADMIN — ROCURONIUM BROMIDE 40 MG: 10 INJECTION INTRAVENOUS at 17:22

## 2022-07-01 RX ADMIN — LABETALOL HYDROCHLORIDE 5 MG: 5 INJECTION, SOLUTION INTRAVENOUS at 19:00

## 2022-07-01 RX ADMIN — LABETALOL HYDROCHLORIDE 5 MG: 5 INJECTION, SOLUTION INTRAVENOUS at 19:31

## 2022-07-01 RX ADMIN — SOTALOL HYDROCHLORIDE 120 MG: 80 TABLET ORAL at 21:57

## 2022-07-01 RX ADMIN — FENTANYL CITRATE 50 MCG: 50 INJECTION INTRAMUSCULAR; INTRAVENOUS at 17:42

## 2022-07-01 RX ADMIN — SODIUM CHLORIDE 15 MG/HR: 9 INJECTION, SOLUTION INTRAVENOUS at 22:53

## 2022-07-01 RX ADMIN — SODIUM CHLORIDE 5 MG/HR: 9 INJECTION, SOLUTION INTRAVENOUS at 20:53

## 2022-07-01 RX ADMIN — LEVETIRACETAM 500 MG: 500 TABLET, FILM COATED ORAL at 08:23

## 2022-07-01 RX ADMIN — LABETALOL HYDROCHLORIDE 5 MG: 5 INJECTION, SOLUTION INTRAVENOUS at 18:51

## 2022-07-01 RX ADMIN — SOTALOL HYDROCHLORIDE 120 MG: 80 TABLET ORAL at 08:22

## 2022-07-01 RX ADMIN — LEVETIRACETAM 500 MG: 500 TABLET, FILM COATED ORAL at 21:57

## 2022-07-01 RX ADMIN — OXYCODONE AND ACETAMINOPHEN 1 TABLET: 325; 10 TABLET ORAL at 12:23

## 2022-07-01 RX ADMIN — Medication 10 ML: at 21:52

## 2022-07-01 RX ADMIN — FENTANYL CITRATE 50 MCG: 50 INJECTION INTRAMUSCULAR; INTRAVENOUS at 17:21

## 2022-07-01 RX ADMIN — DILTIAZEM HYDROCHLORIDE 240 MG: 240 CAPSULE, EXTENDED RELEASE ORAL at 08:23

## 2022-07-01 RX ADMIN — FENTANYL CITRATE 100 MCG: 50 INJECTION INTRAMUSCULAR; INTRAVENOUS at 17:51

## 2022-07-01 RX ADMIN — FAMOTIDINE 40 MG: 20 TABLET, FILM COATED ORAL at 08:23

## 2022-07-01 RX ADMIN — HYDROMORPHONE HYDROCHLORIDE 0.5 MG: 1 INJECTION, SOLUTION INTRAMUSCULAR; INTRAVENOUS; SUBCUTANEOUS at 18:09

## 2022-07-01 RX ADMIN — OXYCODONE AND ACETAMINOPHEN 1 TABLET: 325; 10 TABLET ORAL at 22:01

## 2022-07-01 RX ADMIN — ATORVASTATIN CALCIUM 20 MG: 10 TABLET, FILM COATED ORAL at 21:57

## 2022-07-01 RX ADMIN — CEFEPIME HYDROCHLORIDE 2 G: 2 INJECTION, POWDER, FOR SOLUTION INTRAVENOUS at 13:39

## 2022-07-01 RX ADMIN — HYDROMORPHONE HYDROCHLORIDE 0.5 MG: 1 INJECTION, SOLUTION INTRAMUSCULAR; INTRAVENOUS; SUBCUTANEOUS at 19:12

## 2022-07-01 RX ADMIN — GABAPENTIN 300 MG: 300 CAPSULE ORAL at 08:22

## 2022-07-01 RX ADMIN — CEFEPIME HYDROCHLORIDE 2 G: 2 INJECTION, POWDER, FOR SOLUTION INTRAVENOUS at 21:49

## 2022-07-01 RX ADMIN — PROPOFOL 40 MG: 10 INJECTION, EMULSION INTRAVENOUS at 17:45

## 2022-07-01 RX ADMIN — OXYCODONE HYDROCHLORIDE AND ACETAMINOPHEN 1 TABLET: 7.5; 325 TABLET ORAL at 08:22

## 2022-07-01 RX ADMIN — Medication 3 MG: at 18:07

## 2022-07-01 RX ADMIN — HYDROMORPHONE HYDROCHLORIDE 0.5 MG: 1 INJECTION, SOLUTION INTRAMUSCULAR; INTRAVENOUS; SUBCUTANEOUS at 18:10

## 2022-07-01 RX ADMIN — LIDOCAINE HYDROCHLORIDE 1 EACH: 40 SOLUTION TOPICAL at 17:24

## 2022-07-01 RX ADMIN — LIDOCAINE HYDROCHLORIDE 100 MG: 20 INJECTION, SOLUTION EPIDURAL; INFILTRATION; INTRACAUDAL; PERINEURAL at 17:21

## 2022-07-01 RX ADMIN — GABAPENTIN 300 MG: 300 CAPSULE ORAL at 21:49

## 2022-07-01 RX ADMIN — PROPOFOL 150 MG: 10 INJECTION, EMULSION INTRAVENOUS at 17:21

## 2022-07-01 RX ADMIN — ACETAMINOPHEN 650 MG: 325 TABLET, FILM COATED ORAL at 15:55

## 2022-07-01 NOTE — OP NOTE
And Procedure Note  Preop Diagnosis: Postoperative infection, unspecified type, initial encounter [T81.40XA]  S/P craniotomy [Z98.890]    Post-Op Diagnosis Codes:     * Postoperative infection, unspecified type, initial encounter [T81.40XA]     * S/P craniotomy [Z98.890]     Procedure Name: Cranial wound vision and drainage and washout  Craniectomy    Indications:  A imaging and clinical exam revealed findings of infected cranial wound. The patient now presents for incision and drainage of wound and removal of bone flap after discussing therapeutic alternatives.          Surgeon: Felipe Banerjee MD     Assistants: None    Anesthesia: General endotracheal anesthesia    ASA Class: 3    Procedure Details   After obtaining informed consent, having the risks and benefits of the procedure explained including but not limited to infection, bleeding, paralysis, spinal fluid leak, bowel bladder incontinence, stroke, coma, and death.  Patient was brought to the operating.  He was given general anesthesia via an endotracheal tube.  He was laid supine on operating table.  His head was placed on a foam donut and turned to the left.  The previous incision was readily identified.  His hair was removed using electric clippers.  The head was then prepped and draped in standard sterile fashion.  10 blade scalpel was used to make an incision over the previous wound.  There was an immediate rush of purulent material in the subgaleal space.  The remainder the incision was opened using a combination of sharp dissection and Bovie cautery.  The skin flap was pulled forward.  The subgaleal space and epidural space was grossly contaminated with purulent material.  The bone flap was then removed using a screwdriver.  Culture samples were obtained the entire wound was then copiously irrigated with 2 L of vancomycin solution.  The wound was then inspected for hemostasis.  The galea was reapproximated using a series of inverted erupted 0 Vicryl  sutures.  And the skin was closed in a running 4-0 Monocryl.  All sponge needle and instrument counts were correct at the end the procedure.  The patient was extubated in stable condition returned to recovery with about 50 cc of blood loss.    Findings:  Grossly contaminated cranial wound.  Purulent material in the subgaleal space and epidural space.]    Estimated Blood Loss:  50           Drains: None           Total IV Fluids: ml           Specimens:            Implants:   Implant Name Type Inv. Item Serial No.  Lot No. LRB No. Used Action   HEMOST ABS SURGIFOAM  8X12 10MM - [id number] Implant HEMOST ABS SURGIFOAM  8X12 10MM  [employer]  [employer] [id number] Right 1 Implanted   KT HEMOST ABS SURGIFOAM PORCN 1GRAM - [id number] Implant KT HEMOST ABS SURGIFOAM PORCN 1GRAM  [employer]  [employer] J [id number] Right 1 Implanted              Complications: None           Disposition: PACU - hemodynamically stable.           Condition: stable        Felipe Banerjee MD

## 2022-07-01 NOTE — PROGRESS NOTES
"Pharmacy Dosing Service  Pharmacokinetics  Vancomycin Follow-up Evaluation    Assessment/Action/Plan:  Loading dose: 20mg/kg 2500mg  Followed by: Vancomycin 1250 mg IVPB every 12 hours  Current end date:7/10/22  Additional antimicrobial agent(s): Cefepime     Vancomycin dosage initiated based on population pharmacokinetic parameters. Predicted steady state concentrations below. Ordered trough level due at 0900 7/2/22.  ID consulted.  Pharmacy will continue to follow daily and adjust dose accordingly.          Subjective:  Elijah Escobar is a 66 y.o. male currently on Vancomycin 1250 mg IV every 12 hours for the treatment of SSTI, day 2 of 10 of treatment.    AUC Model Data:  Loading dose: 2500 mg   Regimen: 1250 mg IV every 12 hours.  Exposure target: AUC24 (range)400-600 mg/L.hr   AUC24,ss: 461 mg/L.hr  PAUC*: 65 %  Ctrough,ss: 14.8 mg/L  Pconc*: 26 %  Tox.: 10 %    Objective:  Ht: 177.8 cm (70\"); Wt: 125 kg (276 lb 6.4 oz)  Estimated Creatinine Clearance: 125.2 mL/min (by C-G formula based on SCr of 0.77 mg/dL).   Creatinine   Date Value Ref Range Status   07/01/2022 0.77 0.76 - 1.27 mg/dL Final   06/30/2022 0.68 (L) 0.76 - 1.27 mg/dL Final   06/25/2022 0.78 0.76 - 1.27 mg/dL Final   10/21/2020 1.03 0.60 - 1.30 mg/dL Final   02/07/2020 1.00 0.60 - 1.30 mg/dL Final   02/07/2020 1.00 0.60 - 1.30 mg/dL Final      Lab Results   Component Value Date    WBC 10.28 07/01/2022    WBC 14.28 (H) 06/30/2022    WBC 14.20 (H) 06/25/2022       No results found for: VANCOPEAK, VANCOTROUGH, VANCORANDOM    Culture Results:  Microbiology Results (last 10 days)       Procedure Component Value - Date/Time    Body Fluid Culture - Body Fluid, Scalp [[id number]] Collected: 06/30/22 1618    Lab Status: Preliminary result Specimen: Body Fluid from Scalp Updated: 07/01/22 0544     Body Fluid Culture Abnormal Stain     Gram Stain Many (4+) WBCs per low power field      Few (2+) Gram negative bacilli    COVID-19, ABBOTT IN-HOUSE,NASAL Swab " (NO TRANSPORT MEDIA) 2 HR TAT - Swab, Nasopharynx [[id number]]  (Normal) Collected: 06/30/22 1230    Lab Status: Final result Specimen: Swab from Nasopharynx Updated: 06/30/22 1316     COVID19 Presumptive Negative    Narrative:      Fact sheet for providers: https://www.fda.gov/media/606469/download     Fact sheet for patients: https://www.fda.gov/media/001484/download    Test performed by PCR.  If inconsistent with clinical signs and symptoms patient should be tested with different authorized molecular test.    Urine Culture - Urine, Urine, Clean Catch [[id number]]  (Normal) Collected: 06/23/22 2338    Lab Status: Final result Specimen: Urine, Clean Catch Updated: 06/25/22 1236     Urine Culture No growth    COVID PRE-OP / PRE-PROCEDURE SCREENING ORDER (NO ISOLATION) - Swab, Nasopharynx [[id number]]  (Normal) Collected: 06/23/22 2235    Lab Status: Final result Specimen: Swab from Nasopharynx Updated: 06/23/22 2314    Narrative:      The following orders were created for panel order COVID PRE-OP / PRE-PROCEDURE SCREENING ORDER (NO ISOLATION) - Swab, Nasopharynx.  Procedure                               Abnormality         Status                     ---------                               -----------         ------                     COVID-19 and FLU A/B PCR...[[id number]]  Normal              Final result                 Please view results for these tests on the individual orders.    COVID-19 and FLU A/B PCR - Swab, Nasopharynx [[id number]]  (Normal) Collected: 06/23/22 2235    Lab Status: Final result Specimen: Swab from Nasopharynx Updated: 06/23/22 2314     COVID19 Not Detected     Influenza A PCR Not Detected     Influenza B PCR Not Detected    Narrative:      Fact sheet for providers: https://www.fda.gov/media/370877/download    Fact sheet for patients: https://www.fda.gov/media/429377/download    Test performed by PCR.    Blood Culture - Blood, Arm, Right [[id number]]  (Normal) Collected: 06/23/22 2207    Lab  Status: Final result Specimen: Blood from Arm, Right Updated: 06/28/22 2232     Blood Culture No growth at 5 days    Blood Culture - Blood, Arm, Left [[id number]]  (Normal) Collected: 06/23/22 2200    Lab Status: Final result Specimen: Blood from Arm, Left Updated: 06/28/22 2232     Blood Culture No growth at 5 days            NALLELY Mccurdy RPH   07/01/22 09:32 CDT

## 2022-07-01 NOTE — PLAN OF CARE
Goal Outcome Evaluation:              Outcome Evaluation: Pt Aox4, VSS. No neuro changes noted. NPO as ordered in preparation for procedure. Maintaining sats on room air. Drainage noted from bilat eyes. Pt c/o headache, PRN given with relief. Appears to be resting well. IV abx infusing as ordered. Safety maintained.

## 2022-07-01 NOTE — CONSULTS
[facility] Medicine Consult  Inpatient Hospitalist Consult  Consult performed by: Adelso Chavez MD  Consult ordered by: Rahul Arnold APRN          Date of Admission: 6/30/2022  Date of Consult: 06/30/22    Primary Care Physician: Dylan Lama APRN  Referring Physician: Dr. Banerjee  Chief Complaint/Reason for Consultation: Medical clearance    Subjective   History of Present Illness  Patient is a 66 years old with a recent craniotomy for tumor 6/16/2022.  Patient has a history of coronary artery disease, atrial fib, diabetes, erectile dysfunction, reflux, hypertension, hyperlipidemia.  Patient presented with right-sided headaches over right temporal region and dizziness with standing.  Patient also presented with periorbital lesion with purulent drainage from the left eye.  We are consulted for surgical clearance.  Patient had an episode of atrial fibrillation with the last surgery.  Plan for surgery-craniotomy tomorrow    Review of Systems   Constitutional: Positive for activity change, appetite change and fatigue. Negative for chills and fever.   HENT: Negative for hearing loss, nosebleeds, tinnitus and trouble swallowing.    Eyes: Negative for visual disturbance.   Respiratory: Negative for cough, chest tightness, shortness of breath and wheezing.    Cardiovascular: Negative for chest pain, palpitations and leg swelling.   Gastrointestinal: Negative for abdominal distention, abdominal pain, blood in stool, constipation, diarrhea, nausea and vomiting.   Endocrine: Negative for cold intolerance, heat intolerance, polydipsia, polyphagia and polyuria.   Genitourinary: Negative for decreased urine volume, difficulty urinating, dysuria, flank pain, frequency and hematuria.   Musculoskeletal: Negative for arthralgias, joint swelling and myalgias.   Skin: Negative for rash.   Allergic/Immunologic: Negative for immunocompromised state.   Neurological: Positive for dizziness,  weakness and headaches. Negative for syncope and light-headedness.   Hematological: Negative for adenopathy. Does not bruise/bleed easily.   Psychiatric/Behavioral: Negative for confusion and sleep disturbance. The patient is not nervous/anxious.       Otherwise complete ROS is negative except as mentioned above.    Past Medical History:   Past Medical History:   Diagnosis Date   • Brain tumor (HCC)    • Coronary artery disease    • COVID-19 vaccine series completed     MODERNA X 3; LAST DOSE 3/2022   • Diabetes (HCC)    • Erectile dysfunction    • GERD (gastroesophageal reflux disease)    • Hypercholesteremia    • Hypertension      Past Surgical History:  Past Surgical History:   Procedure Laterality Date   • BALLOON ANGIOPLASTY, ARTERY Right    • COLONOSCOPY     • CRANIOTOMY FOR TUMOR Right 6/16/2022    Procedure: CRANIOTOMY FOR TUMOR STERIOTACTIC WITH BRAIN LAB, right;  Surgeon: Flaco Portillo MD;  Location: [facility];  Service: Neurosurgery;  Laterality: Right;     Social History:  reports that he has been smoking. He has been smoking about 0.50 packs per day. He has never used smokeless tobacco. He reports that he does not drink alcohol and does not use drugs.    Family History: family history includes Cancer in his mother; Heart disease in his father.     Allergies: No Known Allergies  Medications: Scheduled Meds:atorvastatin, 20 mg, Oral, Nightly  [START ON 7/1/2022] cefepime, 2 g, Intravenous, Q8H  [START ON 7/1/2022] dilTIAZem CD, 240 mg, Oral, Q24H  [START ON 7/1/2022] famotidine, 40 mg, Oral, Daily  gabapentin, 300 mg, Oral, TID  [START ON 7/1/2022] insulin lispro, 3-14 Units, Subcutaneous, TID AC  levETIRAcetam, 500 mg, Oral, BID  [START ON 7/1/2022] lisinopril, 40 mg, Oral, Daily  senna-docusate sodium, 2 tablet, Oral, BID  sodium chloride, 10 mL, Intravenous, Q12H  sotalol, 120 mg, Oral, BID  vancomycin, 20 mg/kg, Intravenous, Once   Followed by  [START ON 7/1/2022] vancomycin, 1,250 mg,  Intravenous, Q12H      Continuous Infusions:   PRN Meds:.•  acetaminophen **OR** acetaminophen **OR** acetaminophen  •  senna-docusate sodium **AND** polyethylene glycol **AND** bisacodyl **AND** bisacodyl  •  dextrose  •  dextrose  •  glucagon (human recombinant)  •  labetalol  •  ondansetron **OR** ondansetron  •  oxyCODONE-acetaminophen  •  oxyCODONE-acetaminophen  •  sodium chloride    I have utilized all available immediate resources to obtain, update, and review the patient's current medications.    Objective   Objective    Physical Exam:   Temp:  [97.8 °F (36.6 °C)-98.8 °F (37.1 °C)] 98 °F (36.7 °C)  Heart Rate:  [61-79] 79  Resp:  [16-20] 18  BP: (143-175)/(58-82) 147/66  Physical Exam  Vitals and nursing note reviewed.   HENT:      Head: Normocephalic.   Eyes:      Conjunctiva/sclera: Conjunctivae normal.      Pupils: Pupils are equal, round, and reactive to light.   Neck:      Vascular: No JVD.   Cardiovascular:      Rate and Rhythm: Normal rate and regular rhythm.      Heart sounds: Normal heart sounds.   Pulmonary:      Effort: Pulmonary effort is normal. No respiratory distress.      Breath sounds: Normal breath sounds. No wheezing or rales.   Chest:      Chest wall: No tenderness.   Abdominal:      General: Bowel sounds are normal. There is no distension.      Palpations: Abdomen is soft.      Tenderness: There is no abdominal tenderness.   Musculoskeletal:         General: No tenderness or deformity. Normal range of motion.      Cervical back: Neck supple.   Skin:     General: Skin is warm and dry.      Capillary Refill: Capillary refill takes 2 to 3 seconds.      Findings: No rash.   Neurological:      Mental Status: He is alert and oriented to person, place, and time.      Cranial Nerves: No cranial nerve deficit.      Motor: Weakness present. No abnormal muscle tone.      Deep Tendon Reflexes: Reflexes normal.   Psychiatric:         Mood and Affect: Mood normal.         Behavior: Behavior normal.          Thought Content: Thought content normal.         Judgment: Judgment normal.           Results Reviewed:  I have personally reviewed current lab, radiology, and data and agree with results.  Lab Results (last 24 hours)     Procedure Component Value Units Date/Time    Lactic Acid, Plasma [[id number]]  (Normal) Collected: 06/30/22 2212    Specimen: Blood Updated: 06/30/22 2247     Lactate 1.3 mmol/L     Protime-INR [[id number]]  (Abnormal) Collected: 06/30/22 2212    Specimen: Blood Updated: 06/30/22 2246     Protime 14.3 Seconds      INR 1.16    aPTT [[id number]]  (Normal) Collected: 06/30/22 2212    Specimen: Blood Updated: 06/30/22 2246     PTT 29.1 seconds     Blood Culture - Blood, Arm, Left [[id number]] Collected: 06/30/22 2026    Specimen: Blood from Arm, Left Updated: 06/30/22 2223    Blood Culture - Blood, Arm, Left [[id number]] Collected: 06/30/22 2030    Specimen: Blood from Arm, Left Updated: 06/30/22 2223    Urinalysis With Culture If Indicated - Urine, Clean Catch [[id number]]  (Abnormal) Collected: 06/30/22 1947    Specimen: Urine, Clean Catch Updated: 06/30/22 2149     Color, UA Yellow     Appearance, UA Cloudy     pH, UA 5.5     Specific Gravity, UA 1.025     Glucose,  mg/dL (Trace)     Ketones, UA Negative     Bilirubin, UA Negative     Blood, UA Negative     Protein, UA Trace     Leuk Esterase, UA Small (1+)     Nitrite, UA Negative     Urobilinogen, UA 1.0 E.U./dL    Narrative:      In absence of clinical symptoms, the presence of pyuria, bacteria, and/or nitrites on the urinalysis result does not correlate with infection.    Urinalysis, Microscopic Only - Urine, Clean Catch [[id number]]  (Abnormal) Collected: 06/30/22 1947    Specimen: Urine, Clean Catch Updated: 06/30/22 2149     RBC, UA 6-12 /HPF      WBC, UA 6-12 /HPF      Bacteria, UA Trace /HPF      Squamous Epithelial Cells, UA 3-6 /HPF      Hyaline Casts, UA None Seen /LPF      Methodology Manual Light Microscopy    Urine Culture -  Urine, Urine, Clean Catch [[id number]] Collected: 06/30/22 1947    Specimen: Urine, Clean Catch Updated: 06/30/22 2149    [place] Urine - Urine, Clean Catch [[id number]] Collected: 06/30/22 1947    Specimen: Urine, Clean Catch Updated: 06/30/22 2101     Extra Tube Hold for add-ons.     Comment: Auto resulted.           Imaging Results (Last 24 Hours)     Procedure Component Value Units Date/Time    XR Chest 1 View [[id number]] Collected: 06/30/22 2157     Updated: 06/30/22 2205    Narrative:      EXAM/TECHNIQUE: XR CHEST 1 VW-     INDICATION: preop; T81.40XA-Infection following a procedure,  unspecified, initial encounter; Z98.890-Other specified postprocedural  states     COMPARISON: 06/23/2022     FINDINGS:     Cardiomediastinal silhouette is within normal limits. No pleural  effusion or pneumothorax. No focal consolidation. No acute osseous  findings.       Impression:         No acute findings.  This report was finalized on 06/30/2022 21:57 by Dr. Mk Chinchilla MD.          Assessment / Plan   Assessment:     Active Hospital Problems    Diagnosis    • Post-operative infection    • S/P craniotomy      Added automatically from request for surgery [id number]          Plan:   Brain tumor/hemangioma.  Status post brain tumor June 16, 2022.  Maxipime.  Vancomycin.  Keppra.  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    History of atrial fibrillation/hypertension/hyperlipidemia.  Lipitor.  Cardizem CD . lisinopril.  Labetalol as needed.  Echocardiogram 6/19/2022-  · 56 - 60%. Left ventricular systolic function is normal.  · Left ventricular wall thickness is consistent with mild concentric hypertrophy.  · Normal right ventricular cavity size and systolic function noted.  · There is no significant (greater than mild) valvular dysfunction.  RCRI score is 3 risk for major complication cardiac wise 14.4%.  Benefit will outweigh the risk.    Diabetes.  Sliding scale.    Reflux.  Pepcid.   Zofran as needed.    Chronic pain/neuropathy.  Neurontin.  Percocet as needed.    Moderate obesity.  BMI is 39.    Constipation.  Hannah-Colace.  MiraLAX as needed.  Dulcolax as needed.  Dulcolax suppository as needed.      SCD.    Nutrition.  N.p.o. after midnight.    Code Status/Advanced Care Plan: Full code.    The patient's surrogate decision maker is Ruth Ann, spouse.     I discussed my findings and recommendations with the patient.    Patient seen and examined by me on 6/30/2022 at 1129.    Electronically signed by Adelso Chavez MD, 06/30/22, 23:28 CDT.

## 2022-07-01 NOTE — ANESTHESIA PROCEDURE NOTES
Peripheral IV    Patient location during procedure: pre-op  Start time: 7/1/2022 5:08 PM  End time: 7/1/2022 5:08 PM  Line placed for Difficult Access.  Performed By   Anesthesiologist: Socorro Veronica MD  Preanesthetic Checklist  Completed: patient identified, IV checked, site marked, risks and benefits discussed, surgical consent, monitors and equipment checked, pre-op evaluation and timeout performed  Peripheral IV Prep   Patient position: supine   Prep: ChloraPrep  Patient monitoring: continuous pulse ox  Peripheral IV Procedure   Laterality:right  Location:  Antecubital  Catheter size: 18 G  Guidance: ultrasound guided         Post Assessment   Dressing Type: gauze, tape and transparent.    IV Dressing/Site: clean, dry and intact

## 2022-07-01 NOTE — ANESTHESIA PROCEDURE NOTES
Arterial Line    Pre-sedation assessment completed: 7/1/2022 4:53 PM    Patient reassessed immediately prior to procedure    Patient location during procedure: pre-op  Start time: 7/1/2022 4:54 PM  Stop Time:7/1/2022 4:56 PM       Line placed for hemodynamic monitoring and ABGs/Labs/ISTAT.  Performed By   Anesthesiologist: Socorro Veronica MD  Preanesthetic Checklist  Completed: patient identified, IV checked, site marked, risks and benefits discussed, surgical consent, monitors and equipment checked, pre-op evaluation and timeout performed  Arterial Line Prep   Sterile Tech: cap, gloves and mask  Prep: ChloraPrep  Patient monitoring: continuous pulse oximetry  Arterial Line Procedure   Laterality:left  Location:  radial artery  Catheter size: 20 G   Guidance: ultrasound guided  PROCEDURE NOTE/ULTRASOUND INTERPRETATION.  Using ultrasound guidance the potential vascular sites for insertion of the catheter were visualized to determine the patency of the vessel to be used for vascular access.  After selecting the appropriate site for insertion, the needle was visualized under ultrasound being inserted into the radial artery, followed by ultrasound confirmation of wire and catheter placement. There were no abnormalities seen on ultrasound; an image was taken; and the patient tolerated the procedure with no complications.   Number of attempts: 1  Successful placement: yes  Post Assessment   Dressing Type: secured with tape and wrist guard applied.   Complications no  Circ/Move/Sens Assessment: normal and unchanged.   Patient Tolerance: patient tolerated the procedure well with no apparent complications

## 2022-07-01 NOTE — ED NOTES
Pt given something to eat and drink. Informed him of rm assignment and that this nurse will check on pain meds for him.

## 2022-07-01 NOTE — ANESTHESIA PROCEDURE NOTES
Airway  Urgency: elective    Date/Time: 7/1/2022 5:24 PM  Airway not difficult    General Information and Staff    Patient location during procedure: OR  CRNA/CAA: Olga Hensley CRNA    Indications and Patient Condition  Indications for airway management: airway protection    Preoxygenated: yes  Mask difficulty assessment: 2 - vent by mask + OA or adjuvant +/- NMBA    Final Airway Details  Final airway type: endotracheal airway      Successful airway: ETT  Cuffed: yes   Successful intubation technique: direct laryngoscopy and video laryngoscopy  Facilitating devices/methods: intubating stylet  Endotracheal tube insertion site: oral  Blade: Candelario  Blade size: 4  ETT size (mm): 7.5  Cormack-Lehane Classification: grade I - full view of glottis  Placement verified by: chest auscultation and capnometry   Measured from: lips  ETT/EBT  to lips (cm): 23  Number of attempts at approach: 1  Assessment: lips, teeth, and gum same as pre-op and atraumatic intubation

## 2022-07-01 NOTE — ED PROVIDER NOTES
Subjective   Patient is a 66-year-old male who presents the ER today with complaints of headache.  The patient had a craniotomy for a tumor on June 16, 2022.  The patient had presented earlier today with complaint of a headache.  He was found to have a postoperative infection after being seen by neurosurgery.  The patient was recommended to be admitted to the hospital however he had some things that he needed to do at home so he left AGAINST MEDICAL ADVICE earlier.  He has now returned and would like to be admitted.  He presents today for further evaluation.      History provided by:  Patient   used: No        Review of Systems   Neurological: Positive for headaches.   All other systems reviewed and are negative.      Past Medical History:   Diagnosis Date   • Brain tumor (HCC)    • Coronary artery disease    • COVID-19 vaccine series completed     MODERNA X 3; LAST DOSE 3/2022   • Diabetes (HCC)    • Erectile dysfunction    • GERD (gastroesophageal reflux disease)    • Hypercholesteremia    • Hypertension        No Known Allergies    Past Surgical History:   Procedure Laterality Date   • BALLOON ANGIOPLASTY, ARTERY Right    • COLONOSCOPY     • CRANIOTOMY FOR TUMOR Right 6/16/2022    Procedure: CRANIOTOMY FOR TUMOR STERIOTACTIC WITH BRAIN LAB, right;  Surgeon: Flaco Portillo MD;  Location: [facility];  Service: Neurosurgery;  Laterality: Right;       Family History   Problem Relation Age of Onset   • Cancer Mother         liver   • Heart disease Father        Social History     Socioeconomic History   • Marital status:    Tobacco Use   • Smoking status: Current Every Day Smoker     Packs/day: 0.50   • Smokeless tobacco: Never Used   Vaping Use   • Vaping Use: Never used   Substance and Sexual Activity   • Alcohol use: Never   • Drug use: Never   • Sexual activity: Defer           Objective   Physical Exam  Vitals and nursing note reviewed.   Constitutional:       Appearance: Normal  appearance.   HENT:      Head: Normocephalic.      Right Ear: External ear normal.      Left Ear: External ear normal.      Mouth/Throat:      Mouth: Mucous membranes are moist.      Pharynx: Oropharynx is clear.   Eyes:      Conjunctiva/sclera: Conjunctivae normal.      Pupils: Pupils are equal, round, and reactive to light.   Cardiovascular:      Rate and Rhythm: Normal rate and regular rhythm.   Pulmonary:      Effort: Pulmonary effort is normal.      Breath sounds: Normal breath sounds.   Abdominal:      General: Bowel sounds are normal.      Palpations: Abdomen is soft.   Skin:     General: Skin is warm and dry.      Capillary Refill: Capillary refill takes less than 2 seconds.   Neurological:      General: No focal deficit present.      Mental Status: He is alert and oriented to person, place, and time.   Psychiatric:         Mood and Affect: Mood normal.         Behavior: Behavior normal.         Procedures         No orders to display     Labs Reviewed   BLOOD CULTURE   BLOOD CULTURE   RAINBOW URINE       ED Course  ED Course as of 06/30/22 2032   Thu Jun 30, 2022 2005 I have discussed pt case with Dr. Banerjee and Rahul Arnold NP. Pt will be admitted at this time to Dr. Banerjee in stable cond.  [LF]      ED Course User Index  [LF] Marcy Bryan, APRN                                 No orders to display     Labs Reviewed   BLOOD CULTURE   BLOOD CULTURE   RAINBOW URINE               MDM  Number of Diagnoses or Management Options  Postoperative infection, unspecified type, initial encounter: established and worsening     Amount and/or Complexity of Data Reviewed  Clinical lab tests: ordered  Discuss the patient with other providers: yes    Patient Progress  Patient progress: stable      Final diagnoses:   Postoperative infection, unspecified type, initial encounter       ED Disposition  ED Disposition     ED Disposition   Decision to Admit    Condition   --    Comment   Level of Care: Med/Surg [1]    Diagnosis: Post-operative infection [924850]   Admitting Physician: MEETA SCHWARTZ [[id number]]   Attending Physician: MEETA SCHWARTZ [[id number]]   Isolate for COVID?: No [0]   Certification: I Certify That Inpatient Hospital Services Are Medically Necessary For Greater Than 2 Midnights               No follow-up provider specified.       Medication List      No changes were made to your prescriptions during this visit.          Marcy Bryan, ADITYA  06/30/22 2021       Marcy Bryan, ADITYA  06/30/22 2032

## 2022-07-01 NOTE — PROGRESS NOTES
[facility] Medicine Services  INPATIENT PROGRESS NOTE    Patient Name: Elijah Escobar  Date of Admission: 6/30/2022  Today's Date: 07/01/22  Length of Stay: 1  Primary Care Physician: Dylan Lama APRN    Subjective   Chief Complaint: Follow-up medical management    HPI   Patient seen resting comfortably in bed.  He is alert and interactive.  Only complaint is of headache.  Denies chest pain or shortness of breath.  On room air.      Review of Systems   All pertinent negatives and positives are as above. All other systems have been reviewed and are negative unless otherwise stated.     Objective    Temp:  [97.8 °F (36.6 °C)-98.8 °F (37.1 °C)] 98.1 °F (36.7 °C)  Heart Rate:  [61-79] 61  Resp:  [18-20] 20  BP: (137-175)/(58-82) 144/69  Physical Exam  GEN: Awake, alert, interactive, in NAD  HEENT:   EOMI, Anicteric, Trachea midline  Lungs:  no wheezing/rales/rhonchi  Heart: RRR, +S1/s2, no rub  ABD: soft, nt/nd, +BS, no guarding/rebound  Extremities: atraumatic, no cyanosis  Skin: no rashes or lesions  Neuro: AAOx3, DENISE  Psych: normal mood & affect        Results Review:  I have reviewed the labs, radiology results, and diagnostic studies.    Laboratory Data:   Results from last 7 days   Lab Units 07/01/22  0556 06/30/22  1225 06/25/22  1103   WBC 10*3/mm3 10.28 14.28* 14.20*   HEMOGLOBIN g/dL 9.4* 10.3* 11.5*   HEMATOCRIT % 30.5* 31.2* 34.2*   PLATELETS 10*3/mm3 280 282 154        Results from last 7 days   Lab Units 07/01/22  0556 06/30/22  1225 06/25/22  1103   SODIUM mmol/L 141 139 135*   POTASSIUM mmol/L 3.9 4.5 3.8   CHLORIDE mmol/L 106 105 101   CO2 mmol/L 28.0 25.0 24.0   BUN mg/dL 8 8 13   CREATININE mg/dL 0.77 0.68* 0.78   CALCIUM mg/dL 8.8 8.8 7.8*   BILIRUBIN mg/dL 0.2 0.3  --    ALK PHOS U/L 109 127*  --    ALT (SGPT) U/L 37 45*  --    AST (SGOT) U/L 19 23  --    GLUCOSE mg/dL 140* 202* 175*       Culture Data:   No results found for: BLOODCX, URINECX, WOUNDCX,  MRSACX, RESPCX, STOOLCX    Radiology Data:   Imaging Results (Last 24 Hours)     Procedure Component Value Units Date/Time    XR Chest 1 View [[id number]] Collected: 06/30/22 2157     Updated: 06/30/22 2205    Narrative:      EXAM/TECHNIQUE: XR CHEST 1 VW-     INDICATION: preop; T81.40XA-Infection following a procedure,  unspecified, initial encounter; Z98.890-Other specified postprocedural  states     COMPARISON: 06/23/2022     FINDINGS:     Cardiomediastinal silhouette is within normal limits. No pleural  effusion or pneumothorax. No focal consolidation. No acute osseous  findings.       Impression:         No acute findings.  This report was finalized on 06/30/2022 21:57 by Dr. Mk Chinchilla MD.          I have reviewed the patient's current medications.     Assessment/Plan     Active Hospital Problems    Diagnosis    • Post-operative infection    • S/P craniotomy      Added automatically from request for surgery [id number]     • Paroxysmal atrial fibrillation with rapid ventricular response ([facility])    • Coronary artery disease    • Type 2 diabetes mellitus with hyperglycemia and peripheral neuropathy, with long-term current use of insulin ([facility])    • Hypertension    • GERD (gastroesophageal reflux disease)        Plan:  #1 meningioma -status post recent craniotomy with concern for potential infection versus pseudomeningocele.  Currently on prophylactic antibiotics.  Possible plan for repeat craniotomy and washout.  Care per neurosurgery and ID.    #2 paroxysmal A. Fib/flutter -currently sinus on arrival.  He is on sotalol and Cardizem.  Not on anticoagulation given history.    #3 DM2 -sugars really have not been bad.  Currently n.p.o. however.  On sliding scale insulin with hypoglycemia protocol.    #4 hypertension -BP stable on current regimen.  Monitor closely.    #5 GERD -currently has Pepcid ordered.      Electronically signed by Nima Bowman DO, 07/01/22, 10:49 CDT.

## 2022-07-01 NOTE — PROGRESS NOTES
"Pharmacy Dosing Service  Pharmacokinetics  Vancomycin Initial Evaluation  Assessment/Action/Plan:  Loading dose: 20mg/kg 2500mg  Followed by: Vancomycin 1250 mg IVPB every 12 hours  Current end date:7/10/22  Additional antimicrobial agent(s): Cefepime    Vancomycin dosage initiated based on population pharmacokinetic parameters. Predicted steady state concentrations below. Pharmacy will continue to follow daily and adjust dose accordingly.    AUC Model Data:  Loading dose: 2500 mg   Regimen: 1250 mg IV every 12 hours.  Exposure target: AUC24 (range)400-600 mg/L.hr   AUC24,ss: 461 mg/L.hr  PAUC*: 65 %  Ctrough,ss: 14.8 mg/L  Pconc*: 26 %  Tox.: 10 %     Subjective:  Elijah Escobar is a 66 y.o. male with a Vancomycin \"Pharmacy to Dose\" consult for the treatment of SSTI , day 1 of 10 of treatment.      Objective:  Ht: 177.8 cm (70\"); Wt: 125 kg (276 lb 6.4 oz)  Estimated Creatinine Clearance: 141.8 mL/min (A) (by C-G formula based on SCr of 0.68 mg/dL (L)).   Creatinine   Date Value Ref Range Status   06/30/2022 0.68 (L) 0.76 - 1.27 mg/dL Final   06/25/2022 0.78 0.76 - 1.27 mg/dL Final   06/24/2022 1.02 0.76 - 1.27 mg/dL Final      Lab Results   Component Value Date    WBC 14.28 (H) 06/30/2022    WBC 14.20 (H) 06/25/2022    WBC 12.53 (H) 06/24/2022      Baseline culture results:  Microbiology Results (last 10 days)       Procedure Component Value - Date/Time    COVID-19, [facility]BBOTT IN-HOUSE,NASAL Swab (NO TRANSPORT MEDIA) 2 HR TAT - Swab, Nasopharynx [[id number]]  (Normal) Collected: 06/30/22 1230    Lab Status: Final result Specimen: Swab from Nasopharynx Updated: 06/30/22 1316     COVID19 Presumptive Negative    Narrative:      Fact sheet for providers: https://www.fda.gov/media/950764/download     Fact sheet for patients: https://www.fda.gov/media/953194/download    Test performed by PCR.  If inconsistent with clinical signs and symptoms patient should be tested with different authorized molecular test.    Urine " Culture - Urine, Urine, Clean Catch [[id number]]  (Normal) Collected: 06/23/22 2338    Lab Status: Final result Specimen: Urine, Clean Catch Updated: 06/25/22 1236     Urine Culture No growth    COVID PRE-OP / PRE-PROCEDURE SCREENING ORDER (NO ISOLATION) - Swab, Nasopharynx [[id number]]  (Normal) Collected: 06/23/22 2235    Lab Status: Final result Specimen: Swab from Nasopharynx Updated: 06/23/22 2314    Narrative:      The following orders were created for panel order COVID PRE-OP / PRE-PROCEDURE SCREENING ORDER (NO ISOLATION) - Swab, Nasopharynx.  Procedure                               Abnormality         Status                     ---------                               -----------         ------                     COVID-19 and FLU A/B PCR...[[id number]]  Normal              Final result                 Please view results for these tests on the individual orders.    COVID-19 and FLU A/B PCR - Swab, Nasopharynx [[id number]]  (Normal) Collected: 06/23/22 2235    Lab Status: Final result Specimen: Swab from Nasopharynx Updated: 06/23/22 2314     COVID19 Not Detected     Influenza A PCR Not Detected     Influenza B PCR Not Detected    Narrative:      Fact sheet for providers: https://www.fda.gov/media/394663/download    Fact sheet for patients: https://www.fda.gov/media/466178/download    Test performed by PCR.    Blood Culture - Blood, Arm, Right [[id number]]  (Normal) Collected: 06/23/22 2207    Lab Status: Final result Specimen: Blood from Arm, Right Updated: 06/28/22 2232     Blood Culture No growth at 5 days    Blood Culture - Blood, Arm, Left [[id number]]  (Normal) Collected: 06/23/22 2200    Lab Status: Final result Specimen: Blood from Arm, Left Updated: 06/28/22 2232     Blood Culture No growth at 5 days            TITI Bass PharmD  06/30/22 21:34 CDT

## 2022-07-01 NOTE — PLAN OF CARE
Goal Outcome Evaluation:  Plan of Care Reviewed With: patient        Progress: no change  Outcome Evaluation: Pt A&Ox4. Up stand-by ast to BR. IV abx given per orders. Edema on R side of scalp. Meds given with sips of water w/ NPO status maintained per orders. Sx later this afternoon. Sugar monitored. VSS. Call light in reach. Safety maintained. Will cont to monitor.

## 2022-07-01 NOTE — CONSULTS
INFECTIOUS DISEASES CONSULT NOTE    Patient:  Elijah Escobar 66 y.o. male  ROOM # 351/1  YOB: 1955  MRN: [id number]  [facility]:  [id number]  Admit date: 6/30/2022   Admitting Physician: Felipe Banerjee MD  Primary Care Physician: Dylan Lama APRN  REFERRING PROVIDER: Provider, No Known    Reason for Consultation: Cranial infection    History of Present Illness/Chief Complaint: Pleasant 66-year-old man.  He recently underwent craniotomy for meningioma.  He indicates postoperatively he initially did well.  He then began having some difficulties with right-sided headache.  He does not recall significant fever.  He has developed some blistered areas or impetigo type lesions medial aspect of both eye sockets and proximal bridge of the nose.  He presented to the hospital.  He has been admitted for further management.  Imaging suggests possible pseudomeningocele or abscess.  He has had body fluid culture sent which demonstrates white blood cells and gram-negative bacilli.  It appears from chart review that he will have surgical exploration later today for incision and drainage and possible craniectomy.  Infectious disease asked to evaluate and offer recommendations.    Current Scheduled Medications:   atorvastatin, 20 mg, Oral, Nightly  cefepime, 2 g, Intravenous, Q8H  dilTIAZem CD, 240 mg, Oral, Q24H  famotidine, 40 mg, Oral, Daily  gabapentin, 300 mg, Oral, TID  insulin lispro, 3-14 Units, Subcutaneous, TID AC  levETIRAcetam, 500 mg, Oral, BID  lisinopril, 40 mg, Oral, Daily  senna-docusate sodium, 2 tablet, Oral, BID  sodium chloride, 10 mL, Intravenous, Q12H  sotalol, 120 mg, Oral, BID  vancomycin, 1,250 mg, Intravenous, Q12H    Current PRN Medications:  •  acetaminophen **OR** acetaminophen **OR** acetaminophen  •  senna-docusate sodium **AND** polyethylene glycol **AND** bisacodyl **AND** bisacodyl  •  dextrose  •  dextrose  •  glucagon (human recombinant)  •  labetalol  •  ondansetron **OR**  "ondansetron  •  oxyCODONE-acetaminophen  •  oxyCODONE-acetaminophen  •  sodium chloride    Allergies:  No Known Allergies     Past Medical History: History of craniotomy for grade 1 meningioma.  Orthostatic dizziness.  Concern for postoperative edema versus pseudomeningocele versus postoperative infection.  Headache.  Gastroesophageal reflux disease.  Hypercholesterolemia.  Hypertension.  Diabetes mellitus.  Erectile dysfunction.  Coronary artery disease.    Past Surgical History: Previous craniotomy on 2022 for right-sided meningioma.  Previous angioplasty.    Social History: Smokes 1/2 pack/day of cigarettes.  No alcohol or illicit drug use.  .  Lives in [facility].  Retired.  Previously worked for the [employer].    Family History: Mother history of cancer.  Father history of heart disease.    Exposure History: No close contacts have been ill.  He has had 2 COVID-19 vaccinations and COVID-19 booster.    Review of Systems  No cough or shortness of breath  No chest pain or chest pressure  No nausea vomiting  No abdominal pain  No urinary symptoms  No rash or skin itching  Please see HPI for remaining pertinent positive negatives from his complete review of systems    Vital Signs:  /69 (BP Location: Right arm, Patient Position: Lying)   Pulse 59   Temp 98.3 °F (36.8 °C) (Oral)   Resp 18   Ht 177.8 cm (70\")   Wt 125 kg (276 lb 6.4 oz)   SpO2 99%   BMI 39.66 kg/m²  Temp (24hrs), Av.1 °F (36.7 °C), Min:97.8 °F (36.6 °C), Max:98.8 °F (37.1 °C)    Physical Exam  Vital signs - reviewed.  Line/IV site - No erythema or tenderness.  Lungs clear without crackles  Heart regular rhythm without murmur  Abdomen is soft and nontender without hepatosplenomegaly.  Obese.  Extremities trace edema  Medial eye area bilaterally and upper nose with some blisters and a little bit of crusting consistent with impetigo.  Prior craniotomy incision well approximated without drainage  He has some " tense induration and some firm fluctuance right frontoparietal area.  Cranial nerves II through XII seem to be intact  Motor and sensory exam seem to be intact in the upper and lower extremity    Lab Results:  CBC: Results from last 7 days   Lab Units 07/01/22  0556 06/30/22  1225 06/25/22  1103   WBC 10*3/mm3 10.28 14.28* 14.20*   HEMOGLOBIN g/dL 9.4* 10.3* 11.5*   HEMATOCRIT % 30.5* 31.2* 34.2*   PLATELETS 10*3/mm3 280 282 154     CMP:   Results from last 7 days   Lab Units 07/01/22  0556 06/30/22  1225 06/25/22  1103   SODIUM mmol/L 141 139 135*   POTASSIUM mmol/L 3.9 4.5 3.8   CHLORIDE mmol/L 106 105 101   CO2 mmol/L 28.0 25.0 24.0   BUN mg/dL 8 8 13   CREATININE mg/dL 0.77 0.68* 0.78   CALCIUM mg/dL 8.8 8.8 7.8*   BILIRUBIN mg/dL 0.2 0.3  --    ALK PHOS U/L 109 127*  --    ALT (SGPT) U/L 37 45*  --    AST (SGOT) U/L 19 23  --    GLUCOSE mg/dL 140* 202* 175*     Culture results:  Body fluid culture yesterday-many white blood cells, few gram-negative bacilli, cultures no growth to date  Blood cultures June 30, 2022-2 sets no growth  COVID-19 testing yesterday-negative  Urine culture June 23, 2022-no growth    Radiology:     CT angiogram of the chest June 24, 2022:  IMPRESSION:  1. There is no evidence of embolic disease.  2. Coronary calcifications are present.  3. Prominent pulmonary artery. This may be associated with pulmonary  arterial hypertension.  This report was finalized on 06/24/2022 07:53 by Dr. Dontae Grijalva MD    Head CT without contrast on June 30, 2022:  IMPRESSION:  Impression:    1. Expected postoperative changes following recent right frontal  convexity meningioma resection.  2. No acute intracranial hemorrhage, ischemia or worsening mass  effect/edema.  This report was finalized on 06/30/2022 13:15 by Dr Rudy Orta, .    MRI of brain and brainstem with and without gadolinium enhancement on June 30, 2022:  IMPRESSION:  Impression:     1. Recent right frontal convexity meningioma resection.  There is an  approximately 7.9 x 6.5 x 0.9 cm rim-enhancing fluid collection which is  seen overlying the bone flap, suspicious for a pseudomeningocele. This  is less than 1 cm deep to the skin incision and given the amount of rim  enhancement which is present, it is difficult to exclude infection. I  will say that this collection does not image as an abscess on diffusion,  with no diffusion restriction noted. Similarly, there is no subdural  empyema present.  2. Pachymeningeal enhancement is also present along the right cerebral  convexity although this is not unexpected in the postoperative setting.     Findings and recommendations were discussed with CYNTHIA MORSE  on 6/30/2022 at 2:57 PM CDT.  This report was finalized on 06/30/2022 14:58 by Dr Rudy Orta, .    Additional Studies Reviewed:   2D echocardiogram June 19, 2022:  · Left ventricular ejection fraction appears to be 56 - 60%. Left ventricular systolic function is normal.  · Left ventricular wall thickness is consistent with mild concentric hypertrophy.  · Normal right ventricular cavity size and systolic function noted.  · There is no significant (greater than mild) valvular dysfunction.  ·   Impression:   1.  Recent craniotomy for meningioma  2.  Possible pseudomeningocele versus postop infection right frontoparietal area  3.  Possible impetigo upper nose/medial eye area  4.  Diabetes mellitus  5.  Coronary artery disease    Recommendations:    Continue empiric antibiotic treatment with vancomycin and cefepime  Await results of body fluid culture sent yesterday  Probable surgical exploration for incision and drainage possible craniectomy today  Will continue to follow closely and adjust antibiotics based on culture results and clinical course    Edwin Jeffers MD  07/01/22  13:54 CDT

## 2022-07-01 NOTE — OUTREACH NOTE
Medical Week 2 Survey    Flowsheet Row Responses   [facility] patient discharged from? [place]   Does the patient have one of the following disease processes/diagnoses(primary or secondary)? Other   Week 2 attempt successful? No   Unsuccessful attempts Attempt 1   Revoke Readmitted          CARMELITA OLMEDO - Registered Nurse

## 2022-07-01 NOTE — ANESTHESIA PREPROCEDURE EVALUATION
Anesthesia Evaluation     Patient summary reviewed and Nursing notes reviewed   no history of anesthetic complications:  NPO Solid Status: > 8 hours  NPO Liquid Status: > 8 hours           Airway   Mallampati: I  TM distance: >3 FB  Neck ROM: full  No difficulty expected  Dental      Pulmonary    (+) a smoker Current,   (-) sleep apnea  Cardiovascular   Exercise tolerance: poor (<4 METS)    ECG reviewed  Patient on routine beta blocker and Beta blocker given within 24 hours of surgery    (+) hypertension, CAD (coronary calcification on CT), dysrhythmias Atrial Fib, PVD, hyperlipidemia,     ROS comment: Echo 6/20/22  · Left ventricular ejection fraction appears to be 56 - 60%. Left ventricular systolic function is normal.  · Left ventricular wall thickness is consistent with mild concentric hypertrophy.  · Normal right ventricular cavity size and systolic function noted.  · There is no significant (greater than mild) valvular dysfunction.         Neuro/Psych  (-) seizures, TIA, CVA    ROS Comment: Pt is two weeks s/p crani for meningioma  Admitted with incisional wound, possible post op intracranial infection vs meningocele  GI/Hepatic/Renal/Endo    (+) morbid obesity, GERD,  diabetes mellitus type 2 using insulin,   (-) liver disease, no renal disease    Musculoskeletal     Abdominal    Substance History      OB/GYN          Other                          Anesthesia Plan    ASA 3     general     intravenous induction     Anesthetic plan, risks, benefits, and alternatives have been provided, discussed and informed consent has been obtained with: patient.        CODE STATUS:

## 2022-07-01 NOTE — H&P
NEUROSURGERY INITIAL HOSPITAL ENCOUNTER    Assessment/Plan:   Elijah Escobar is a 66 y.o. male with a significant medical history of recent craniotomy for tumor, 6/16/2022, coronary artery disease, A. fib, diabetes, erectile dysfunction, GERD, hypertension, hyperlipidemia. He presents with a new problem of a persistent right sided headache, particularly over the right temporal region and dizziness with standing.  Physical exam findings of tense but fluctuant area of edema from the mid frontal to right parietal region of the scalp, the area is tender to palpation, periorbital lesions with purulent drainage from the left eye, scalp incision is clean, dry, and intact, bright and awake, oriented x3, speech is clear, follows commands without prompting showing thumbs up and 2 fingers bilaterally, no pronator drift, left dysmetria, gross hyporeflexia, and bilateral Babinski.  CT of the head from 6/23/2022, showing expected postsurgical changes to the right frontal lobe, small amount of fluid collection and pneumocephalus in the right subdural space, small amount of vasogenic edema with 4.3 mm left-to-right midline shift.  Repeat CT of the head obtained 6/30/2022 shows no acute intracranial blood products, unresolved vasogenic edema, midline shift resolved, increased left frontal and temporal soft tissue swelling.  MRI of the brain from 6/30/2022 shows a 7.9 x 6.5 x 0.9 cm rim-enhancing fluid collection overlying the bone flap concerning for pseudomeningocele versus infection.    Differential Diagnosis:   Status post craniotomy for WHO grade 1 meningioma  Headache  Orthostatic dizziness  Concern for postoperative infection     Medical decision making  Mr. Escobar presented to [facility] ED with a complaint of progressively worsening right frontal and temporal headache and orthostatic dizziness.  He remains neurologically at baseline, however his imaging shows a 7.9 x 6.5 x 0.9 concerning for pseudomeningocele  versus infection.   Mr. Escobar right frontal and temporal scalp was cleaned with chlorhexidine x2.  A large bore needle was inserted into the soft tissue via aseptic technique. 10 mL of purulent fluid was obtained and sent to lab for fluid culture. Hematologic values: WBC 14.28; CRP 5.38; UA 1+ leuks with trace bacteria;  body fluid culture 4+ WBCs, 2+ gram-negative bacilli; BC NGTD, lactic acid unremarkable.  Considering these findings, recommendations as follows...    Recommendations:  · Admit to 3A  · Neurochecks per policy.  Call for decline  · Routine labs, CBC, CMP, CRP, UA, lactate, PT, PTT, blood cultures x2.  · Began vancomycin and cefepime after blood cultures  · Cleared for surgery by hospitalist with RCRI score of 14.4%.  Benefits will outweigh the risk.  · Preoperative EKG and chest x-ray  · N.p.o. after midnight  · Plan for surgical I&D and washout and possible craniectomy  · Additional recommendations to follow-up with Dr. Banerjee     I discussed the patients findings and my recommendations with patient    Thank you very much for this interesting consult.     Level of Risk: High due to:  abrupt change in neurological status  MDM: High  Mod = 07819, [id number]  ___________________________________________________________________    Reason for consult: Headache, orthostatic dizziness, status postcraniotomy for tumor  Consultation at the request of No Known Provider.    Chief Complaint:   Chief Complaint   Patient presents with   • RE-EVALUATION     HPI: Elijah Escobar is a 66 y.o. male with a significant medical history of recent craniotomy for tumor, 6/16/2022, coronary artery disease, A. fib, diabetes, erectile dysfunction, GERD, hypertension, hyperlipidemia. He presents with a new problem of a persistent right sided headache, particularly over the right temporal region and dizziness with standing.      On 6/16/2022 the patient was brought to the main operating room where he underwent an uneventful  craniotomy for tumor removal.  Postoperatively he  did extremely well and his neurological exam remained unchanged.  He was kept in the hospital for close neurologic monitoring, airway observation, and for pain control.  His JUAN drain has been removed.  He has been able to ambulate, tolerate oral diet, oral pain medications and void.     Prior to discharge Mr. Escobar experience an episode of A. fib with RVR.  He was evaluated by both hospital services and cardiology.  He was initially placed on a Cardizem drip and carvedilol was transitioned back to sotalol.  An echocardiogram was obtained and he was found to have a left ventricular ejection fraction of 55-60% with normal left ventricular systolic function.  His pulse rate normalized and he was deemed safe for discharge home by cardiology.       Mr. Escobar return to [facility] on 6/24/2022 with A. fib with RVR and was subsequently found to have a urinary tract infection.  He has since been discharged with reports of well-controlled heart rate and blood pressure.     He returns with complaints of persistent right-sided headache and dizziness with standing. He additionally reports 1 fall from standing height while packing groceries.  He denies hitting his head or loss of consciousness.  States he completed full course of steroid taper and is taking Keppra as prescribed.  He additionally denies fevers, chills, seizure-like activity, visual disturbances, difficulty with words or word finding, facial asymmetry or dysesthesias, unilateral weakness, nausea or vomiting.    Review of Systems   Constitutional: Negative for chills and fever.   HENT: Negative.    Eyes: Negative.    Respiratory: Negative.    Cardiovascular: Negative.    Gastrointestinal: Negative.    Endocrine: Negative.    Genitourinary: Negative.    Musculoskeletal: Positive for gait problem.   Skin: Negative.    Allergic/Immunologic: Negative.    Neurological: Positive for dizziness. Negative for  tremors, seizures, facial asymmetry, speech difficulty, weakness, numbness and headaches.   Hematological: Negative.    Psychiatric/Behavioral: Negative.    All other systems reviewed and are negative.     Past Medical History:  has a past medical history of Brain tumor (HCC), Coronary artery disease, COVID-19 vaccine series completed, Diabetes (HCC), Erectile dysfunction, GERD (gastroesophageal reflux disease), Hypercholesteremia, and Hypertension.    Past Surgical History:  has a past surgical history that includes Colonoscopy; Balloon angioplasty, artery (Right); and Craniotomy for Tumor (Right, 6/16/2022).    Family History: family history includes Cancer in his mother; Heart disease in his father.    Social History:  reports that he has been smoking. He has been smoking about 0.50 packs per day. He has never used smokeless tobacco. He reports that he does not drink alcohol and does not use drugs.    Allergies: Patient has no known allergies.    Home Medications:   Current Facility-Administered Medications:   •  acetaminophen (TYLENOL) tablet 650 mg, 650 mg, Oral, Q4H PRN **OR** acetaminophen (TYLENOL) 160 MG/5ML solution 650 mg, 650 mg, Oral, Q4H PRN **OR** acetaminophen (TYLENOL) suppository 650 mg, 650 mg, Rectal, Q4H PRN, Rahul Arnold, APRN  •  atorvastatin (LIPITOR) tablet 20 mg, 20 mg, Oral, Nightly, Rahul Arnold, APRN, 20 mg at 06/30/22 2258  •  sennosides-docusate (PERICOLACE) 8.6-50 MG per tablet 2 tablet, 2 tablet, Oral, BID, 2 tablet at 06/30/22 2257 **AND** polyethylene glycol (MIRALAX) packet 17 g, 17 g, Oral, Daily PRN **AND** bisacodyl (DULCOLAX) EC tablet 5 mg, 5 mg, Oral, Daily PRN **AND** bisacodyl (DULCOLAX) suppository 10 mg, 10 mg, Rectal, Daily PRN, Rahul Arnold, APRN  •  cefepime (MAXIPIME) 2 g/100 mL 0.9% NS (mbp), 2 g, Intravenous, Q8H, Rahul Arnold APRN, 2 g at 07/01/22 0542  •  dextrose (D50W) (25 g/50 mL) IV injection 25 g, 25 g, Intravenous, Q15 Min PRN, Rahul Arnold, APRN  •   dextrose (GLUTOSE) oral gel 15 g, 15 g, Oral, Q15 Min PRN, Rahul Arnold, APRN  •  dilTIAZem CD (CARDIZEM CD) 24 hr capsule 240 mg, 240 mg, Oral, Q24H, Rahul Arnold, APRN  •  famotidine (PEPCID) tablet 40 mg, 40 mg, Oral, Daily, RusRahul farooq, APRN  •  gabapentin (NEURONTIN) capsule 300 mg, 300 mg, Oral, TID, RusRahul farooq, APRN, 300 mg at 06/30/22 2301  •  glucagon (human recombinant) (GLUCAGEN DIAGNOSTIC) injection 1 mg, 1 mg, Intramuscular, Q15 Min PRN, Rahul Arnold APRN  •  Insulin Lispro (humaLOG) injection 3-14 Units, 3-14 Units, Subcutaneous, TID AC, Rahul Arnold, APRN  •  labetalol (NORMODYNE,TRANDATE) injection 10 mg, 10 mg, Intravenous, Q6H PRN, Rahul Arnold APRN  •  levETIRAcetam (KEPPRA) tablet 500 mg, 500 mg, Oral, BID, Rahul Arnold, APRN, 500 mg at 06/30/22 2257  •  lisinopril (PRINIVIL,ZESTRIL) tablet 40 mg, 40 mg, Oral, Daily, Rahul Arnold, APRN  •  ondansetron (ZOFRAN) tablet 4 mg, 4 mg, Oral, Q6H PRN **OR** ondansetron (ZOFRAN) injection 4 mg, 4 mg, Intravenous, Q6H PRN, Rahul Arnold, APRN  •  oxyCODONE-acetaminophen (PERCOCET)  MG per tablet 1 tablet, 1 tablet, Oral, Q4H PRN, Rahul Arnold APRN  •  oxyCODONE-acetaminophen (PERCOCET) 7.5-325 MG per tablet 1 tablet, 1 tablet, Oral, Q4H PRN, Rahul Arnold, APRN, 1 tablet at 07/01/22 0115  •  sodium chloride 0.9 % flush 10 mL, 10 mL, Intravenous, Q12H, Rahul Arnold APRN  •  sodium chloride 0.9 % flush 10 mL, 10 mL, Intravenous, PRN, Rahul Arnold APRN  •  sotalol (BETAPACE) tablet 120 mg, 120 mg, Oral, BID, Rahul Arnold APRN, 120 mg at 06/30/22 5913  •  [COMPLETED] vancomycin (VANCOCIN) 2,500 mg in sodium chloride 0.9 % 500 mL IVPB, 20 mg/kg, Intravenous, Once, 2,500 mg at 06/30/22 3588 **FOLLOWED BY** vancomycin 1250 mg/250 mL 0.9% NS IVPB ([facility]), 1,250 mg, Intravenous, Q12H, Felipe Banerjee MD    Medications: Scheduled Meds:atorvastatin, 20 mg, Oral, Nightly  cefepime, 2 g, Intravenous, Q8H  dilTIAZem CD, 240 mg, Oral,  Q24H  famotidine, 40 mg, Oral, Daily  gabapentin, 300 mg, Oral, TID  insulin lispro, 3-14 Units, Subcutaneous, TID AC  levETIRAcetam, 500 mg, Oral, BID  lisinopril, 40 mg, Oral, Daily  senna-docusate sodium, 2 tablet, Oral, BID  sodium chloride, 10 mL, Intravenous, Q12H  sotalol, 120 mg, Oral, BID  vancomycin, 1,250 mg, Intravenous, Q12H      Continuous Infusions:   PRN Meds:.•  acetaminophen **OR** acetaminophen **OR** acetaminophen  •  senna-docusate sodium **AND** polyethylene glycol **AND** bisacodyl **AND** bisacodyl  •  dextrose  •  dextrose  •  glucagon (human recombinant)  •  labetalol  •  ondansetron **OR** ondansetron  •  oxyCODONE-acetaminophen  •  oxyCODONE-acetaminophen  •  sodium chloride    Vital Signs  Temp:  [97.8 °F (36.6 °C)-98.8 °F (37.1 °C)] 98.1 °F (36.7 °C)  Heart Rate:  [61-79] 61  Resp:  [16-20] 20  BP: (137-175)/(58-82) 144/69    Physical Exam  Physical Exam  Vitals and nursing note reviewed.   Constitutional:       General: He is not in acute distress.     Appearance: Normal appearance. He is well-developed and well-groomed. He is morbidly obese. He is not ill-appearing, toxic-appearing or diaphoretic.      Comments: BMI 39.66   HENT:      Head: Normocephalic and atraumatic.        Right Ear: Hearing normal.      Left Ear: Hearing normal.   Eyes:      Extraocular Movements: EOM normal.      Conjunctiva/sclera: Conjunctivae normal.      Pupils: Pupils are equal, round, and reactive to light.   Neck:      Trachea: Trachea normal.   Cardiovascular:      Rate and Rhythm: Normal rate and regular rhythm.   Pulmonary:      Effort: Pulmonary effort is normal. No tachypnea, bradypnea, accessory muscle usage or respiratory distress.   Abdominal:      Palpations: Abdomen is soft.   Musculoskeletal:      Cervical back: Full passive range of motion without pain and neck supple.   Skin:     General: Skin is warm and dry.   Neurological:      Mental Status: He is alert and oriented to person, place, and  time.      GCS: GCS eye subscore is 4. GCS verbal subscore is 5. GCS motor subscore is 6.      Gait: Gait is intact.      Deep Tendon Reflexes:      Reflex Scores:       Tricep reflexes are 1+ on the right side and 1+ on the left side.       Bicep reflexes are 1+ on the right side and 1+ on the left side.       Brachioradialis reflexes are 1+ on the right side and 1+ on the left side.       Patellar reflexes are 1+ on the right side and 1+ on the left side.       Achilles reflexes are 1+ on the right side and 1+ on the left side.  Psychiatric:         Speech: Speech normal.         Behavior: Behavior normal. Behavior is cooperative.       Neurologic Exam     Mental Status   Oriented to person, place, and time.   Attention: normal. Concentration: normal.   Speech: speech is normal   Level of consciousness: alert    Bright and awake.  Oriented x3.  Follows commands without prompting showing thumbs up and 2 fingers bilaterally.  Speech is clear.     Cranial Nerves     CN II   Visual fields full to confrontation.     CN III, IV, VI   Pupils are equal, round, and reactive to light.  Extraocular motions are normal.     CN V   Facial sensation intact.     CN VII   Facial expression full, symmetric.     CN VIII   CN VIII normal.     CN IX, X   CN IX normal.     CN XI   CN XI normal.     Motor Exam   Right arm tone: normal  Left arm tone: normal  Right arm pronator drift: absent  Left arm pronator drift: absent  Right leg tone: normal  Left leg tone: normal    Strength   Right deltoid: 5/5  Left deltoid: 5/5  Right biceps: 5/5  Left biceps: 5/5  Right triceps: 5/5  Left triceps: 5/5  Right wrist extension: 5/5  Left wrist extension: 5/5  Right iliopsoas: 5/5  Left iliopsoas: 5/5  Right quadriceps: 5/5  Left quadriceps: 5/5  Right anterior tibial: 5/5  Left anterior tibial: 5/5  Right gastroc: 5/5  Left gastroc: 5/5  Moves all extremities equal and symmetric  Right EHL 5/5  Left EHL 5/5       Sensory Exam   Right arm light  touch: normal  Left arm light touch: normal  Right leg light touch: normal  Left leg light touch: normal    Gait, Coordination, and Reflexes     Gait  Gait: normal    Tremor   Resting tremor: absent  Intention tremor: absent  Action tremor: absent    Reflexes   Right brachioradialis: 1+  Left brachioradialis: 1+  Right biceps: 1+  Left biceps: 1+  Right triceps: 1+  Left triceps: 1+  Right patellar: 1+  Left patellar: 1+  Right achilles: 1+  Left achilles: 1+  Right plantar: upgoing  Left plantar: upgoing  Right Jj: absent  Left Jj: absent  Right ankle clonus: absent  Left ankle clonus: absent  Right pendular knee jerk: absent  Left pendular knee jerk: absent    Results Review:   Independent review and interpretation of imaging  Imaging Results (Last 24 Hours)     Procedure Component Value Units Date/Time    XR Chest 1 View [[id number]] Collected: 06/30/22 2157     Updated: 06/30/22 2205    Narrative:      EXAM/TECHNIQUE: XR CHEST 1 VW-     INDICATION: preop; T81.40XA-Infection following a procedure,  unspecified, initial encounter; Z98.890-Other specified postprocedural  states     COMPARISON: 06/23/2022     FINDINGS:     Cardiomediastinal silhouette is within normal limits. No pleural  effusion or pneumothorax. No focal consolidation. No acute osseous  findings.       Impression:         No acute findings.  This report was finalized on 06/30/2022 21:57 by Dr. Mk Chinchilla MD.        MRI brain:  MRI spine:       CT Head:  CT c-spine:  CT t-spine:  CT l-spine:  X-ray:    I reviewed the patient's new clinical results.  Lab Results (last 24 hours)     Procedure Component Value Units Date/Time    POC Glucose Once [[id number]]  (Normal) Collected: 07/01/22 0730    Specimen: Blood Updated: 07/01/22 0741     Glucose 101 mg/dL      Comment: : [id number] You Haro ID: [id number]       Comprehensive Metabolic Panel [[id number]]  (Abnormal) Collected: 07/01/22 0556    Specimen: Blood Updated: 07/01/22  0659     Glucose 140 mg/dL      BUN 8 mg/dL      Creatinine 0.77 mg/dL      Sodium 141 mmol/L      Potassium 3.9 mmol/L      Chloride 106 mmol/L      CO2 28.0 mmol/L      Calcium 8.8 mg/dL      Total Protein 6.2 g/dL      Albumin 2.90 g/dL      ALT (SGPT) 37 U/L      AST (SGOT) 19 U/L      Alkaline Phosphatase 109 U/L      Total Bilirubin 0.2 mg/dL      Globulin 3.3 gm/dL      A/G Ratio 0.9 g/dL      BUN/Creatinine Ratio 10.4     Anion Gap 7.0 mmol/L      eGFR 98.7 mL/min/1.73      Comment: National Kidney Foundation and American Society of Nephrology (ASN) Task Force recommended calculation based on the Chronic Kidney Disease Epidemiology Collaboration (CKD-EPI) equation refit without adjustment for race.       Narrative:      GFR Normal >60  Chronic Kidney Disease <60  Kidney Failure <15      CBC Auto Differential [[id number]]  (Abnormal) Collected: 07/01/22 0556    Specimen: Blood Updated: 07/01/22 0643     WBC 10.28 10*3/mm3      RBC 3.09 10*6/mm3      Hemoglobin 9.4 g/dL      Hematocrit 30.5 %      MCV 98.7 fL      MCH 30.4 pg      MCHC 30.8 g/dL      RDW 12.9 %      RDW-SD 45.9 fl      MPV 9.9 fL      Platelets 280 10*3/mm3      Neutrophil % 69.5 %      Lymphocyte % 23.7 %      Monocyte % 5.4 %      Eosinophil % 0.3 %      Basophil % 0.3 %      Immature Grans % 0.8 %      Neutrophils, Absolute 7.15 10*3/mm3      Lymphocytes, Absolute 2.44 10*3/mm3      Monocytes, Absolute 0.55 10*3/mm3      Eosinophils, Absolute 0.03 10*3/mm3      Basophils, Absolute 0.03 10*3/mm3      Immature Grans, Absolute 0.08 10*3/mm3      nRBC 0.0 /100 WBC     Lactic Acid, Plasma [[id number]]  (Normal) Collected: 06/30/22 2212    Specimen: Blood Updated: 06/30/22 2247     Lactate 1.3 mmol/L     Protime-INR [[id number]]  (Abnormal) Collected: 06/30/22 2212    Specimen: Blood Updated: 06/30/22 2246     Protime 14.3 Seconds      INR 1.16    aPTT [[id number]]  (Normal) Collected: 06/30/22 2212    Specimen: Blood Updated: 06/30/22 2246     PTT  29.1 seconds     Blood Culture - Blood, Arm, Left [[id number]] Collected: 06/30/22 2026    Specimen: Blood from Arm, Left Updated: 06/30/22 2223    Blood Culture - Blood, Arm, Left [[id number]] Collected: 06/30/22 2030    Specimen: Blood from Arm, Left Updated: 06/30/22 2223    Urinalysis With Culture If Indicated - Urine, Clean Catch [[id number]]  (Abnormal) Collected: 06/30/22 1947    Specimen: Urine, Clean Catch Updated: 06/30/22 2149     Color, UA Yellow     Appearance, UA Cloudy     pH, UA 5.5     Specific Gravity, UA 1.025     Glucose,  mg/dL (Trace)     Ketones, UA Negative     Bilirubin, UA Negative     Blood, UA Negative     Protein, UA Trace     Leuk Esterase, UA Small (1+)     Nitrite, UA Negative     Urobilinogen, UA 1.0 E.U./dL    Narrative:      In absence of clinical symptoms, the presence of pyuria, bacteria, and/or nitrites on the urinalysis result does not correlate with infection.    Urinalysis, Microscopic Only - Urine, Clean Catch [[id number]]  (Abnormal) Collected: 06/30/22 1947    Specimen: Urine, Clean Catch Updated: 06/30/22 2149     RBC, UA 6-12 /HPF      WBC, UA 6-12 /HPF      Bacteria, UA Trace /HPF      Squamous Epithelial Cells, UA 3-6 /HPF      Hyaline Casts, UA None Seen /LPF      Methodology Manual Light Microscopy    Urine Culture - Urine, Urine, Clean Catch [[id number]] Collected: 06/30/22 1947    Specimen: Urine, Clean Catch Updated: 06/30/22 2149    [place] Urine - Urine, Clean Catch [[id number]] Collected: 06/30/22 1947    Specimen: Urine, Clean Catch Updated: 06/30/22 2101     Extra Tube Hold for add-ons.     Comment: Auto resulted.           Rahul Arnold, APRN

## 2022-07-02 LAB
ANION GAP SERPL CALCULATED.3IONS-SCNC: 14 MMOL/L (ref 5–15)
BACTERIA SPEC AEROBE CULT: ABNORMAL
BASOPHILS # BLD AUTO: 0.02 10*3/MM3 (ref 0–0.2)
BASOPHILS NFR BLD AUTO: 0.1 % (ref 0–1.5)
BUN SERPL-MCNC: 8 MG/DL (ref 8–23)
BUN/CREAT SERPL: 11.3 (ref 7–25)
CALCIUM SPEC-SCNC: 8.4 MG/DL (ref 8.6–10.5)
CHLORIDE SERPL-SCNC: 99 MMOL/L (ref 98–107)
CO2 SERPL-SCNC: 22 MMOL/L (ref 22–29)
CREAT SERPL-MCNC: 0.71 MG/DL (ref 0.76–1.27)
DEPRECATED RDW RBC AUTO: 43.3 FL (ref 37–54)
EGFRCR SERPLBLD CKD-EPI 2021: 101.2 ML/MIN/1.73
EOSINOPHIL # BLD AUTO: 0 10*3/MM3 (ref 0–0.4)
EOSINOPHIL NFR BLD AUTO: 0 % (ref 0.3–6.2)
ERYTHROCYTE [DISTWIDTH] IN BLOOD BY AUTOMATED COUNT: 12.5 % (ref 12.3–15.4)
GLUCOSE BLDC GLUCOMTR-MCNC: 143 MG/DL (ref 70–130)
GLUCOSE BLDC GLUCOMTR-MCNC: 175 MG/DL (ref 70–130)
GLUCOSE BLDC GLUCOMTR-MCNC: 199 MG/DL (ref 70–130)
GLUCOSE SERPL-MCNC: 228 MG/DL (ref 65–99)
HCT VFR BLD AUTO: 33 % (ref 37.5–51)
HGB BLD-MCNC: 10.8 G/DL (ref 13–17.7)
IMM GRANULOCYTES # BLD AUTO: 0.11 10*3/MM3 (ref 0–0.05)
IMM GRANULOCYTES NFR BLD AUTO: 0.8 % (ref 0–0.5)
LYMPHOCYTES # BLD AUTO: 1.28 10*3/MM3 (ref 0.7–3.1)
LYMPHOCYTES NFR BLD AUTO: 9.4 % (ref 19.6–45.3)
MCH RBC QN AUTO: 30.9 PG (ref 26.6–33)
MCHC RBC AUTO-ENTMCNC: 32.7 G/DL (ref 31.5–35.7)
MCV RBC AUTO: 94.6 FL (ref 79–97)
MONOCYTES # BLD AUTO: 0.58 10*3/MM3 (ref 0.1–0.9)
MONOCYTES NFR BLD AUTO: 4.3 % (ref 5–12)
NEUTROPHILS NFR BLD AUTO: 11.64 10*3/MM3 (ref 1.7–7)
NEUTROPHILS NFR BLD AUTO: 85.4 % (ref 42.7–76)
NRBC BLD AUTO-RTO: 0 /100 WBC (ref 0–0.2)
PLATELET # BLD AUTO: 314 10*3/MM3 (ref 140–450)
PMV BLD AUTO: 9.2 FL (ref 6–12)
POTASSIUM SERPL-SCNC: 4.4 MMOL/L (ref 3.5–5.2)
RBC # BLD AUTO: 3.49 10*6/MM3 (ref 4.14–5.8)
SODIUM SERPL-SCNC: 135 MMOL/L (ref 136–145)
VANCOMYCIN TROUGH SERPL-MCNC: 17.5 MCG/ML (ref 5–20)
WBC NRBC COR # BLD: 13.63 10*3/MM3 (ref 3.4–10.8)

## 2022-07-02 PROCEDURE — 63710000001 INSULIN DETEMIR PER 5 UNITS: Performed by: INTERNAL MEDICINE

## 2022-07-02 PROCEDURE — 80202 ASSAY OF VANCOMYCIN: CPT | Performed by: NEUROLOGICAL SURGERY

## 2022-07-02 PROCEDURE — 85025 COMPLETE CBC W/AUTO DIFF WBC: CPT | Performed by: NEUROLOGICAL SURGERY

## 2022-07-02 PROCEDURE — 99024 POSTOP FOLLOW-UP VISIT: CPT | Performed by: NEUROLOGICAL SURGERY

## 2022-07-02 PROCEDURE — 25010000002 CEFEPIME PER 500 MG: Performed by: NEUROLOGICAL SURGERY

## 2022-07-02 PROCEDURE — 80048 BASIC METABOLIC PNL TOTAL CA: CPT | Performed by: NEUROLOGICAL SURGERY

## 2022-07-02 PROCEDURE — 82962 GLUCOSE BLOOD TEST: CPT

## 2022-07-02 PROCEDURE — 25010000002 VANCOMYCIN 10 G RECONSTITUTED SOLUTION: Performed by: NEUROLOGICAL SURGERY

## 2022-07-02 PROCEDURE — 63710000001 INSULIN LISPRO (HUMAN) PER 5 UNITS: Performed by: NEUROLOGICAL SURGERY

## 2022-07-02 RX ORDER — DILTIAZEM HCL IN NACL,ISO-OSM 125 MG/125
5-15 PLASTIC BAG, INJECTION (ML) INTRAVENOUS
Status: DISCONTINUED | OUTPATIENT
Start: 2022-07-02 | End: 2022-07-03

## 2022-07-02 RX ADMIN — SODIUM CHLORIDE, POTASSIUM CHLORIDE, SODIUM LACTATE AND CALCIUM CHLORIDE 100 ML/HR: 600; 310; 30; 20 INJECTION, SOLUTION INTRAVENOUS at 21:57

## 2022-07-02 RX ADMIN — VANCOMYCIN HYDROCHLORIDE 1250 MG: 10 INJECTION, POWDER, LYOPHILIZED, FOR SOLUTION INTRAVENOUS at 14:50

## 2022-07-02 RX ADMIN — LISINOPRIL 40 MG: 20 TABLET ORAL at 08:20

## 2022-07-02 RX ADMIN — Medication 10 ML: at 20:29

## 2022-07-02 RX ADMIN — Medication 10 ML: at 08:21

## 2022-07-02 RX ADMIN — INSULIN DETEMIR 15 UNITS: 100 INJECTION, SOLUTION SUBCUTANEOUS at 20:28

## 2022-07-02 RX ADMIN — LEVETIRACETAM 500 MG: 500 TABLET, FILM COATED ORAL at 20:29

## 2022-07-02 RX ADMIN — CEFEPIME HYDROCHLORIDE 2 G: 2 INJECTION, POWDER, FOR SOLUTION INTRAVENOUS at 21:57

## 2022-07-02 RX ADMIN — FAMOTIDINE 40 MG: 20 TABLET, FILM COATED ORAL at 08:28

## 2022-07-02 RX ADMIN — SOTALOL HYDROCHLORIDE 120 MG: 80 TABLET ORAL at 08:20

## 2022-07-02 RX ADMIN — SOTALOL HYDROCHLORIDE 120 MG: 80 TABLET ORAL at 20:28

## 2022-07-02 RX ADMIN — OXYCODONE AND ACETAMINOPHEN 1 TABLET: 325; 10 TABLET ORAL at 16:57

## 2022-07-02 RX ADMIN — VANCOMYCIN HYDROCHLORIDE 1250 MG: 10 INJECTION, POWDER, LYOPHILIZED, FOR SOLUTION INTRAVENOUS at 21:57

## 2022-07-02 RX ADMIN — DOCUSATE SODIUM 50 MG AND SENNOSIDES 8.6 MG 2 TABLET: 8.6; 5 TABLET, FILM COATED ORAL at 08:20

## 2022-07-02 RX ADMIN — CEFEPIME HYDROCHLORIDE 2 G: 2 INJECTION, POWDER, FOR SOLUTION INTRAVENOUS at 06:20

## 2022-07-02 RX ADMIN — INSULIN LISPRO 3 UNITS: 100 INJECTION, SOLUTION INTRAVENOUS; SUBCUTANEOUS at 08:36

## 2022-07-02 RX ADMIN — GABAPENTIN 300 MG: 300 CAPSULE ORAL at 08:27

## 2022-07-02 RX ADMIN — DILTIAZEM HYDROCHLORIDE 240 MG: 240 CAPSULE, EXTENDED RELEASE ORAL at 08:20

## 2022-07-02 RX ADMIN — GABAPENTIN 300 MG: 300 CAPSULE ORAL at 20:29

## 2022-07-02 RX ADMIN — ATORVASTATIN CALCIUM 20 MG: 10 TABLET, FILM COATED ORAL at 20:29

## 2022-07-02 RX ADMIN — LEVETIRACETAM 500 MG: 500 TABLET, FILM COATED ORAL at 08:20

## 2022-07-02 RX ADMIN — DOCUSATE SODIUM 50 MG AND SENNOSIDES 8.6 MG 2 TABLET: 8.6; 5 TABLET, FILM COATED ORAL at 20:29

## 2022-07-02 RX ADMIN — GABAPENTIN 300 MG: 300 CAPSULE ORAL at 16:31

## 2022-07-02 RX ADMIN — Medication 5 MG/HR: at 01:37

## 2022-07-02 RX ADMIN — CEFEPIME HYDROCHLORIDE 2 G: 2 INJECTION, POWDER, FOR SOLUTION INTRAVENOUS at 14:50

## 2022-07-02 NOTE — ANESTHESIA POSTPROCEDURE EVALUATION
Patient: Elijah Escobar    Procedure Summary     Date: 07/01/22 Room / Location: [facility] OR  /  [facility] OR    Anesthesia Start: 1712 Anesthesia Stop: 1840    Procedure: CRANIECTOMY WITH INCISIONAL WASHOUT (Right ) Diagnosis:       Postoperative infection, unspecified type, initial encounter      S/P craniotomy      (Postoperative infection, unspecified type, initial encounter [T81.40XA])      (S/P craniotomy [Z98.890])    Surgeons: Felipe Banerjee MD Provider: Olga Hensley CRNA    Anesthesia Type: general ASA Status: 3          Anesthesia Type: general    Vitals  Vitals Value Taken Time   /75 07/01/22 1931   Temp 97.1 °F (36.2 °C) 07/01/22 1835   Pulse 97 07/01/22 1948   Resp 21 07/01/22 1930   SpO2 97 % 07/01/22 1948   Vitals shown include unvalidated device data.        Post Anesthesia Care and Evaluation    Patient location during evaluation: PACU  Patient participation: complete - patient participated  Level of consciousness: awake and alert  Pain score: 0  Pain management: adequate    Airway patency: patent  Anesthetic complications: No anesthetic complications  PONV Status: none  Cardiovascular status: acceptable and stable  Respiratory status: acceptable  Hydration status: acceptable

## 2022-07-02 NOTE — PROGRESS NOTES
"Infectious Diseases Progress Note    Patient:  Elijah Escobar  YOB: 1955  MRN: [id number]   Admit date: 6/30/2022   Admitting Physician: Felipe Banerjee MD  Primary Care Physician: Dylan Lama APRN    Chief Complaint/Interval History: He has some postoperative headache.  Not unexpected.  He is hemodynamically stable.  He is without fever.  He is answering questions appropriately.  He is moving all extremities.  He has no new localizing symptoms.  Operative note reviewed. Purulent material and subgaleal space.  Purulent material on epidural space.  Bone flap removed.  Cultures obtained.    Intake/Output Summary (Last 24 hours) at 7/2/2022 1134  Last data filed at 7/2/2022 0800  Gross per 24 hour   Intake 1771.42 ml   Output 2250 ml   Net -478.58 ml     Allergies: No Known Allergies  Current Scheduled Medications:   atorvastatin, 20 mg, Oral, Nightly  cefepime, 2 g, Intravenous, Q8H  dilTIAZem CD, 240 mg, Oral, Q24H  famotidine, 40 mg, Oral, Daily  gabapentin, 300 mg, Oral, TID  insulin detemir, 15 Units, Subcutaneous, Nightly  insulin lispro, 3-14 Units, Subcutaneous, TID AC  levETIRAcetam, 500 mg, Oral, BID  lisinopril, 40 mg, Oral, Daily  senna-docusate sodium, 2 tablet, Oral, BID  sodium chloride, 10 mL, Intravenous, Q12H  sotalol, 120 mg, Oral, BID  vancomycin, 1,250 mg, Intravenous, Q12H      Current PRN Medications:  •  acetaminophen **OR** acetaminophen **OR** acetaminophen  •  senna-docusate sodium **AND** polyethylene glycol **AND** bisacodyl **AND** bisacodyl  •  dextrose  •  dextrose  •  glucagon (human recombinant)  •  labetalol  •  ondansetron **OR** ondansetron  •  oxyCODONE-acetaminophen  •  oxyCODONE-acetaminophen  •  sodium chloride    Review of Systems no cardiopulmonary symptoms.  No diarrhea or rash.    Vital Signs:  BP (!) 143/128   Pulse 107   Temp 98 °F (36.7 °C) (Oral)   Resp 20   Ht 177.8 cm (70\")   Wt 123 kg (271 lb 9.6 oz)   SpO2 100%   BMI 38.97 kg/m² "     Physical Exam  Vital signs - reviewed.  Line/IV site - No erythema, warmth, induration, or tenderness.  Lungs without crackles  Heart without murmur  Abdomen soft nontender  Cranial nerves intact  Moving upper and lower extremities equally    Lab Results:  CBC:   Results from last 7 days   Lab Units 07/02/22  0546 07/01/22  0556 06/30/22  1225   WBC 10*3/mm3 13.63* 10.28 14.28*   HEMOGLOBIN g/dL 10.8* 9.4* 10.3*   HEMATOCRIT % 33.0* 30.5* 31.2*   PLATELETS 10*3/mm3 314 280 282     BMP:  Results from last 7 days   Lab Units 07/02/22  0546 07/01/22  0556 06/30/22  1225   SODIUM mmol/L 135* 141 139   POTASSIUM mmol/L 4.4 3.9 4.5   CHLORIDE mmol/L 99 106 105   CO2 mmol/L 22.0 28.0 25.0   BUN mg/dL 8 8 8   CREATININE mg/dL 0.71* 0.77 0.68*   GLUCOSE mg/dL 228* 140* 202*   CALCIUM mg/dL 8.4* 8.8 8.8   ALT (SGPT) U/L  --  37 45*     Culture Results:   Blood Culture   Date Value Ref Range Status   06/30/2022 No growth at 24 hours  Preliminary   06/30/2022 No growth at 24 hours  Preliminary     Urine Culture   Date Value Ref Range Status   06/30/2022 <10,000 CFU/mL Gram Positive Cocci (A)  Final     Comment:     Call if further workup needed.       Body fluid culture-scalp fluid from June 30, 2022:  Many white blood cells, few gram-negative bacilli, cultures no growth to date    Wound culture from July 1, 2022-many white blood cells, no organisms, culture pending    Tissue/bone culture July 1, 2022-moderate white blood cells, moderate red blood cells, no organisms, culture pending    Radiology: None  Additional Studies Reviewed: None    Impression:   1.  Recent craniotomy for meningioma  2.  Subgaleal/epidural infection  3.  Impetigo upper nose/medial eye area-stable to slight improvement  4.  Diabetes mellitus  5.  Coronary artery disease    Recommendations:   Continue vancomycin and cefepime  Await operative cultures  Continue supportive care    Edwin Jeffers MD

## 2022-07-02 NOTE — PLAN OF CARE
Pt arrived from PACU @2000 s/p crani with washout. Alert and oriented x3-4 throughout shift with increased intermittent confusion at times.  Easily reoriented. Cardene gtt need for while during shift Off by 0015.  Cardizem gtt needed to control Afib RVR throughout part of shift.  Pt converted to SR and Cardizem was off @0409. 3L BNC. Rested well throughout shift. Good UOP throughout shift.  No c/o CP, SOB, or increased WOB noted.  No other issues or concerns noted.  Will continue to monitor for acute changes in status

## 2022-07-02 NOTE — PROGRESS NOTES
[facility] Medicine Services  INPATIENT PROGRESS NOTE    Patient Name: Elijah Escobar  Date of Admission: 6/30/2022  Today's Date: 07/02/22  Length of Stay: 2  Primary Care Physician: Dylan Lama APRN    Subjective   Chief Complaint: Follow-up medical management    HPI   Patient status postcraniotomy yesterday with washout.  Post procedure blood pressure was high he was briefly on Cardene drip and now off.  Arterial line pressures are a little lower than his blood pressure cuff.  He also had a run of A. fib and was on a Cardizem drip which is now off.  Heart rates currently around 100.  He seems to be going in and out of A. fib on monitor.  He has not got his morning meds yet.      Review of Systems   All pertinent negatives and positives are as above. All other systems have been reviewed and are negative unless otherwise stated.     Objective    Temp:  [97.1 °F (36.2 °C)-98.3 °F (36.8 °C)] 98.3 °F (36.8 °C)  Heart Rate:  [] 78  Resp:  [16-21] 21  BP: (124-180)/() 154/68  Arterial Line BP: (118-182)/(59-96) 127/59  Physical Exam  GEN: Awake, alert, interactive, in NAD  HEENT:   Head is bandaged.  EOMI, Anicteric, Trachea midline  Lungs:  no wheezing/rales/rhonchi  Heart: RRR, +S1/s2, no rub  ABD: soft, nt/nd, +BS, no guarding/rebound  Extremities: atraumatic, no cyanosis  Skin: no rashes or lesions  Neuro: AAOx3, DENISE  Psych: normal mood & affect        Results Review:  I have reviewed the labs, radiology results, and diagnostic studies.    Laboratory Data:   Results from last 7 days   Lab Units 07/02/22  0546 07/01/22  0556 06/30/22  1225   WBC 10*3/mm3 13.63* 10.28 14.28*   HEMOGLOBIN g/dL 10.8* 9.4* 10.3*   HEMATOCRIT % 33.0* 30.5* 31.2*   PLATELETS 10*3/mm3 314 280 282        Results from last 7 days   Lab Units 07/02/22  0546 07/01/22  0556 06/30/22  1225   SODIUM mmol/L 135* 141 139   POTASSIUM mmol/L 4.4 3.9 4.5   CHLORIDE mmol/L 99 106 105   CO2 mmol/L  22.0 28.0 25.0   BUN mg/dL 8 8 8   CREATININE mg/dL 0.71* 0.77 0.68*   CALCIUM mg/dL 8.4* 8.8 8.8   BILIRUBIN mg/dL  --  0.2 0.3   ALK PHOS U/L  --  109 127*   ALT (SGPT) U/L  --  37 45*   AST (SGOT) U/L  --  19 23   GLUCOSE mg/dL 228* 140* 202*       Culture Data:   No results found for: BLOODCX, URINECX, WOUNDCX, MRSACX, RESPCX, STOOLCX    Radiology Data:   Imaging Results (Last 24 Hours)     ** No results found for the last 24 hours. **          I have reviewed the patient's current medications.     Assessment/Plan     Active Hospital Problems    Diagnosis    • Post-operative infection    • S/P craniotomy      Added automatically from request for surgery [id number]     • Paroxysmal atrial fibrillation with rapid ventricular response (HCC)    • Coronary artery disease    • Type 2 diabetes mellitus with hyperglycemia and peripheral neuropathy, with long-term current use of insulin (HCC)    • Hypertension    • GERD (gastroesophageal reflux disease)        Plan:  #1 meningioma -status post recent craniotomy with concern for potential infection versus pseudomeningocele.  Taken to the OR on 7/1 for revision and washout of craniectomy with finding of purulent material down to brain suggesting encephalitis.  On Vanco and cefepime.  ID following.    #2 paroxysmal A. Fib/flutter -heart rates up postop yesterday and required Cardizem drip which is now on pause.  Seems to be going in the out of A. fib on the monitor but mostly sinus.  About to get his morning meds and we will see how he does.  Continue on p.o. Cardizem and sotalol.  Not anticoagulated given other events.    #3 DM2 -currently n.p.o. with sliding scale insulin hypoglycemia protocol.  Will follow sugars today may add low-dose Levemir tonight.    #4 hypertension -BP was up postoperatively and briefly on Cardene drip.  As above he is on diltiazem.  He is also on lisinopril.  Monitor.    #5 GERD -currently has Pepcid ordered.      Electronically signed by Nima OLSON  DO Gracie, 07/02/22, 08:00 CDT.

## 2022-07-02 NOTE — PROGRESS NOTES
"Progress Note    Patient:  Elijah Escobar  YOB: 1955  MRN: [id number]   Admit date: 6/30/2022   Admitting Physician: Felipe Banerjee MD  Primary Care Physician: Dylan Lama APRN    Chief Complaint: Intracranial wound infection    Interval History: Headache controlled.  Tolerating p.o.  A. fib with RVR noted overnight.  On Cardizem per the hospitalist briefly.  Off Cardene since midnight for systolics greater than 160    Intake/Output Summary (Last 24 hours) at 7/2/2022 0700  Last data filed at 7/2/2022 0003  Gross per 24 hour   Intake 1000 ml   Output 2250 ml   Net -1250 ml     Allergies: No Known Allergies  Current Scheduled Medications:   atorvastatin, 20 mg, Oral, Nightly  cefepime, 2 g, Intravenous, Q8H  dilTIAZem CD, 240 mg, Oral, Q24H  famotidine, 40 mg, Oral, Daily  gabapentin, 300 mg, Oral, TID  insulin lispro, 3-14 Units, Subcutaneous, TID AC  levETIRAcetam, 500 mg, Oral, BID  lisinopril, 40 mg, Oral, Daily  senna-docusate sodium, 2 tablet, Oral, BID  sodium chloride, 10 mL, Intravenous, Q12H  sotalol, 120 mg, Oral, BID  vancomycin, 1,250 mg, Intravenous, Q12H      Current PRN Medications:  •  acetaminophen **OR** acetaminophen **OR** acetaminophen  •  senna-docusate sodium **AND** polyethylene glycol **AND** bisacodyl **AND** bisacodyl  •  dextrose  •  dextrose  •  glucagon (human recombinant)  •  labetalol  •  ondansetron **OR** ondansetron  •  oxyCODONE-acetaminophen  •  oxyCODONE-acetaminophen  •  sodium chloride    Review of Systems   All other systems reviewed and are negative.      Pertinent positives/negatives documented in HPI.  All other systems reviewed and negative.    Vital Signs:  /68   Pulse 78   Temp 98.3 °F (36.8 °C)   Resp 21   Ht 177.8 cm (70\")   Wt 123 kg (271 lb 9.6 oz)   SpO2 95%   BMI 38.97 kg/m²     Physical Exam  Eyes:      Extraocular Movements: EOM normal.      Pupils: Pupils are equal, round, and reactive to light.   Cardiovascular:      " "Rate and Rhythm: Normal rate. Rhythm irregular.   Pulmonary:      Effort: No respiratory distress.      Breath sounds: Normal breath sounds.   Abdominal:      General: There is no distension.      Palpations: Abdomen is soft.   Neurological:      Mental Status: He is oriented to person, place, and time.      Coordination: Finger-Nose-Finger Test normal.      Gait: Gait is intact.      Deep Tendon Reflexes: Strength normal.   Psychiatric:         Speech: Speech normal.       Vital signs reviewed.  Line/IV site: No erythema, warmth, induration, or tenderness.    Objective:  Vital signs: (most recent): Blood pressure 154/68, pulse 78, temperature 98.3 °F (36.8 °C), resp. rate 21, height 177.8 cm (70\"), weight 123 kg (271 lb 9.6 oz), SpO2 95 %.    Lungs:  He is not in respiratory distress.    Heart: Normal rate.    Abdomen: Abdomen is soft and non-distended.        Neurologic Exam     Mental Status   Oriented to person, place, and time.   Attention: normal.   Speech: speech is normal   Level of consciousness: alert  Knowledge: good.     Cranial Nerves     CN II   Visual fields full to confrontation.     CN III, IV, VI   Pupils are equal, round, and reactive to light.  Extraocular motions are normal.     CN V   Facial sensation intact.     CN VII   Facial expression full, symmetric.     CN VIII   CN VIII normal.     CN IX, X   CN IX normal.   CN X normal.     CN XI   CN XI normal.     CN XII   CN XII normal.     Motor Exam   Muscle bulk: normal  Overall muscle tone: normal  Right arm pronator drift: absent  Left arm pronator drift: absent    Strength   Strength 5/5 throughout.     Sensory Exam   Light touch normal.   Pinprick normal.     Gait, Coordination, and Reflexes     Gait  Gait: normal    Coordination   Finger to nose coordination: normal    Tremor   Resting tremor: absent  Intention tremor: absent  Action tremor: absent    Reflexes   Reflexes 2+ except as noted.       Lab Results:  ABG:     CBC:   Results from " last 7 days   Lab Units 07/02/22  0546 07/01/22  0556 06/30/22  1225 06/25/22  1103   WBC 10*3/mm3 13.63* 10.28 14.28* 14.20*   HEMOGLOBIN g/dL 10.8* 9.4* 10.3* 11.5*   HEMATOCRIT % 33.0* 30.5* 31.2* 34.2*   PLATELETS 10*3/mm3 314 280 282 154     BMP:  Results from last 7 days   Lab Units 07/02/22  0546 07/01/22  0556 06/30/22  1225 06/25/22  1103   SODIUM mmol/L 135* 141 139 135*   POTASSIUM mmol/L 4.4 3.9 4.5 3.8   CHLORIDE mmol/L 99 106 105 101   CO2 mmol/L 22.0 28.0 25.0 24.0   BUN mg/dL 8 8 8 13   CREATININE mg/dL 0.71* 0.77 0.68* 0.78   GLUCOSE mg/dL 228* 140* 202* 175*   CALCIUM mg/dL 8.4* 8.8 8.8 7.8*   ALT (SGPT) U/L  --  37 45*  --      Culture Results:   Blood Culture   Date Value Ref Range Status   06/30/2022 No growth at 24 hours  Preliminary   06/30/2022 No growth at 24 hours  Preliminary           Radiology:     Additional Studies Reviewed:     Impression/Recommendations:   CV: A. fib with RVR.  Off Cardizem currently.  Appreciate hospitalist input.  Systolic blood pressures greater than 160 at times.  Cardene used overnight.  Currently off Cardene.  RESP: Oxygenating well  GI: Advance diet as tolerated  : BUN/creatinine urine output are adequate  NEURO: Nonfocal neurologic exam.  ID: Gram-negative bacilli in intracranial wound.  Purulent material did involve the surface of the brain suggesting encephalitis.  Currently on cefepime and Vanco per infectious disease      Hypertension    GERD (gastroesophageal reflux disease)    Coronary artery disease    Type 2 diabetes mellitus with hyperglycemia and peripheral neuropathy, with long-term current use of insulin (HCC)    Paroxysmal atrial fibrillation with rapid ventricular response (HCC)    Post-operative infection    S/P craniotomy      Felipe Banerjee MD

## 2022-07-02 NOTE — PROGRESS NOTES
"Pharmacy Dosing Service  Pharmacokinetics  Vancomycin Follow-up Evaluation    Assessment/Action/Plan:  Current Order: Vancomycin 1250 mg IVPB every 12 hours  Current end date:7/10/2022  Levels: 7/2/2022 1039 Vancomycin trough = 17.5 (12.25 hours post 1250 mg dose)  Additional antimicrobial agent(s): Cefepime    Continue Vancomycin 1250 mg iv q12h. Pharmacy will continue to follow daily and adjust dose accordingly.     Subjective:  Elijah Escobar is a 66 y.o. male currently on Vancomycin for the treatment of SSTI, day 2 of treatment.    AUC Model Data:  Regimen: 1250 mg IV every 12 hours.  Exposure target: AUC24 (range)400-600 mg/L.hr   AUC24,ss: 533 mg/L.hr  PAUC*: 95 %  Ctrough,ss: 17.6 mg/L  Pconc*: 29 %  Tox.: 13 %    Objective:  Ht: 177.8 cm (70\"); Wt: 123 kg (271 lb 9.6 oz)  Estimated Creatinine Clearance: 134.6 mL/min (A) (by C-G formula based on SCr of 0.71 mg/dL (L)).   Creatinine   Date Value Ref Range Status   07/02/2022 0.71 (L) 0.76 - 1.27 mg/dL Final   07/01/2022 0.77 0.76 - 1.27 mg/dL Final   06/30/2022 0.68 (L) 0.76 - 1.27 mg/dL Final   10/21/2020 1.03 0.60 - 1.30 mg/dL Final   02/07/2020 1.00 0.60 - 1.30 mg/dL Final   02/07/2020 1.00 0.60 - 1.30 mg/dL Final      Lab Results   Component Value Date    WBC 13.63 (H) 07/02/2022    WBC 10.28 07/01/2022    WBC 14.28 (H) 06/30/2022         Lab Results   Component Value Date    VANCOTROUGH 17.50 07/02/2022       Culture Results:  Microbiology Results (last 10 days)       Procedure Component Value - Date/Time    Tissue / Bone Culture - Tissue, Cranium [[id number]] Collected: 07/01/22 6407    Lab Status: Preliminary result Specimen: Tissue from Cranium Updated: 07/02/22 0708     Gram Stain Moderate (3+) WBCs seen      Moderate (3+) Red blood cells      No organisms seen    Wound Culture - Wound, Cranium [[id number]] Collected: 07/01/22     Lab Status: Preliminary result Specimen: Wound from Cranium Updated: 07/02/22 0701     Gram Stain Many (4+) WBCs seen "      No organisms seen    Blood Culture - Blood, Arm, Left [[id number]]  (Normal) Collected: 06/30/22 2030    Lab Status: Preliminary result Specimen: Blood from Arm, Left Updated: 07/01/22 2232     Blood Culture No growth at 24 hours    Blood Culture - Blood, Arm, Left [[id number]]  (Normal) Collected: 06/30/22 2026    Lab Status: Preliminary result Specimen: Blood from Arm, Left Updated: 07/01/22 2232     Blood Culture No growth at 24 hours    Urine Culture - Urine, Urine, Clean Catch [[id number]]  (Abnormal) Collected: 06/30/22 1947    Lab Status: Final result Specimen: Urine, Clean Catch Updated: 07/02/22 0933     Urine Culture <10,000 CFU/mL Gram Positive Cocci     Comment: Call if further workup needed.         Narrative:      Colonization of the urinary tract without infection is common. Treatment is discouraged unless the patient is symptomatic, pregnant, or undergoing an invasive urologic procedure.    Body Fluid Culture - Body Fluid, Scalp [[id number]] Collected: 06/30/22 1618    Lab Status: Preliminary result Specimen: Body Fluid from Scalp Updated: 07/01/22 0544     Body Fluid Culture Abnormal Stain     Gram Stain Many (4+) WBCs per low power field      Few (2+) Gram negative bacilli    COVID-19, [facility] IN-HOUSE,NASAL Swab (NO TRANSPORT MEDIA) 2 HR TAT - Swab, Nasopharynx [[id number]]  (Normal) Collected: 06/30/22 1230    Lab Status: Final result Specimen: Swab from Nasopharynx Updated: 06/30/22 1316     COVID19 Presumptive Negative    Narrative:      Fact sheet for providers: https://www.fda.gov/media/691527/download     Fact sheet for patients: https://www.fda.gov/media/709397/download    Test performed by PCR.  If inconsistent with clinical signs and symptoms patient should be tested with different authorized molecular test.    Urine Culture - Urine, Urine, Clean Catch [[id number]]  (Normal) Collected: 06/23/22 2338    Lab Status: Final result Specimen: Urine, Clean Catch Updated: 06/25/22 1236     Urine  Culture No growth    COVID PRE-OP / PRE-PROCEDURE SCREENING ORDER (NO ISOLATION) - Swab, Nasopharynx [[id number]]  (Normal) Collected: 06/23/22 2235    Lab Status: Final result Specimen: Swab from Nasopharynx Updated: 06/23/22 2314    Narrative:      The following orders were created for panel order COVID PRE-OP / PRE-PROCEDURE SCREENING ORDER (NO ISOLATION) - Swab, Nasopharynx.  Procedure                               Abnormality         Status                     ---------                               -----------         ------                     COVID-19 and FLU A/B PCR...[[id number]]  Normal              Final result                 Please view results for these tests on the individual orders.    COVID-19 and FLU A/B PCR - Swab, Nasopharynx [[id number]]  (Normal) Collected: 06/23/22 2235    Lab Status: Final result Specimen: Swab from Nasopharynx Updated: 06/23/22 2314     COVID19 Not Detected     Influenza A PCR Not Detected     Influenza B PCR Not Detected    Narrative:      Fact sheet for providers: https://www.fda.gov/media/131102/download    Fact sheet for patients: https://www.fda.gov/media/495923/download    Test performed by PCR.    Blood Culture - Blood, Arm, Right [[id number]]  (Normal) Collected: 06/23/22 2207    Lab Status: Final result Specimen: Blood from Arm, Right Updated: 06/28/22 2232     Blood Culture No growth at 5 days    Blood Culture - Blood, Arm, Left [[id number]]  (Normal) Collected: 06/23/22 2200    Lab Status: Final result Specimen: Blood from Arm, Left Updated: 06/28/22 2232     Blood Culture No growth at 5 days            Mk Stahl, PharmD   07/02/22 11:24 CDT

## 2022-07-03 LAB
ANION GAP SERPL CALCULATED.3IONS-SCNC: 7 MMOL/L (ref 5–15)
BACTERIA FLD CULT: ABNORMAL
BACTERIA FLD CULT: ABNORMAL
BACTERIA SPEC AEROBE CULT: ABNORMAL
BACTERIA SPEC AEROBE CULT: ABNORMAL
BASOPHILS # BLD AUTO: 0.02 10*3/MM3 (ref 0–0.2)
BASOPHILS NFR BLD AUTO: 0.2 % (ref 0–1.5)
BUN SERPL-MCNC: 12 MG/DL (ref 8–23)
BUN/CREAT SERPL: 14 (ref 7–25)
CALCIUM SPEC-SCNC: 8.1 MG/DL (ref 8.6–10.5)
CHLORIDE SERPL-SCNC: 102 MMOL/L (ref 98–107)
CO2 SERPL-SCNC: 28 MMOL/L (ref 22–29)
CREAT SERPL-MCNC: 0.86 MG/DL (ref 0.76–1.27)
DEPRECATED RDW RBC AUTO: 45.9 FL (ref 37–54)
EGFRCR SERPLBLD CKD-EPI 2021: 95.5 ML/MIN/1.73
EOSINOPHIL # BLD AUTO: 0.02 10*3/MM3 (ref 0–0.4)
EOSINOPHIL NFR BLD AUTO: 0.2 % (ref 0.3–6.2)
ERYTHROCYTE [DISTWIDTH] IN BLOOD BY AUTOMATED COUNT: 12.9 % (ref 12.3–15.4)
GLUCOSE BLDC GLUCOMTR-MCNC: 141 MG/DL (ref 70–130)
GLUCOSE BLDC GLUCOMTR-MCNC: 175 MG/DL (ref 70–130)
GLUCOSE BLDC GLUCOMTR-MCNC: 300 MG/DL (ref 70–130)
GLUCOSE SERPL-MCNC: 182 MG/DL (ref 65–99)
GRAM STN SPEC: ABNORMAL
HCT VFR BLD AUTO: 32.5 % (ref 37.5–51)
HGB BLD-MCNC: 10.5 G/DL (ref 13–17.7)
IMM GRANULOCYTES # BLD AUTO: 0.07 10*3/MM3 (ref 0–0.05)
IMM GRANULOCYTES NFR BLD AUTO: 0.7 % (ref 0–0.5)
LYMPHOCYTES # BLD AUTO: 2.1 10*3/MM3 (ref 0.7–3.1)
LYMPHOCYTES NFR BLD AUTO: 19.7 % (ref 19.6–45.3)
MAGNESIUM SERPL-MCNC: 2.2 MG/DL (ref 1.6–2.4)
MCH RBC QN AUTO: 31.5 PG (ref 26.6–33)
MCHC RBC AUTO-ENTMCNC: 32.3 G/DL (ref 31.5–35.7)
MCV RBC AUTO: 97.6 FL (ref 79–97)
MONOCYTES # BLD AUTO: 0.87 10*3/MM3 (ref 0.1–0.9)
MONOCYTES NFR BLD AUTO: 8.2 % (ref 5–12)
NEUTROPHILS NFR BLD AUTO: 7.59 10*3/MM3 (ref 1.7–7)
NEUTROPHILS NFR BLD AUTO: 71 % (ref 42.7–76)
NRBC BLD AUTO-RTO: 0 /100 WBC (ref 0–0.2)
PLATELET # BLD AUTO: 266 10*3/MM3 (ref 140–450)
PMV BLD AUTO: 9.4 FL (ref 6–12)
POTASSIUM SERPL-SCNC: 4.3 MMOL/L (ref 3.5–5.2)
RBC # BLD AUTO: 3.33 10*6/MM3 (ref 4.14–5.8)
SODIUM SERPL-SCNC: 137 MMOL/L (ref 136–145)
WBC NRBC COR # BLD: 10.67 10*3/MM3 (ref 3.4–10.8)

## 2022-07-03 PROCEDURE — 82962 GLUCOSE BLOOD TEST: CPT

## 2022-07-03 PROCEDURE — 63710000001 INSULIN LISPRO (HUMAN) PER 5 UNITS: Performed by: NEUROLOGICAL SURGERY

## 2022-07-03 PROCEDURE — 99024 POSTOP FOLLOW-UP VISIT: CPT | Performed by: NEUROLOGICAL SURGERY

## 2022-07-03 PROCEDURE — 83735 ASSAY OF MAGNESIUM: CPT | Performed by: INTERNAL MEDICINE

## 2022-07-03 PROCEDURE — 25010000002 CEFEPIME PER 500 MG: Performed by: NEUROLOGICAL SURGERY

## 2022-07-03 PROCEDURE — 25010000002 VANCOMYCIN 10 G RECONSTITUTED SOLUTION: Performed by: NEUROLOGICAL SURGERY

## 2022-07-03 PROCEDURE — 85025 COMPLETE CBC W/AUTO DIFF WBC: CPT | Performed by: INTERNAL MEDICINE

## 2022-07-03 PROCEDURE — 80048 BASIC METABOLIC PNL TOTAL CA: CPT | Performed by: INTERNAL MEDICINE

## 2022-07-03 PROCEDURE — 63710000001 INSULIN DETEMIR PER 5 UNITS: Performed by: INTERNAL MEDICINE

## 2022-07-03 PROCEDURE — 94799 UNLISTED PULMONARY SVC/PX: CPT

## 2022-07-03 RX ADMIN — DOCUSATE SODIUM 50 MG AND SENNOSIDES 8.6 MG 2 TABLET: 8.6; 5 TABLET, FILM COATED ORAL at 20:54

## 2022-07-03 RX ADMIN — OXYCODONE HYDROCHLORIDE AND ACETAMINOPHEN 1 TABLET: 7.5; 325 TABLET ORAL at 15:05

## 2022-07-03 RX ADMIN — OXYCODONE AND ACETAMINOPHEN 1 TABLET: 325; 10 TABLET ORAL at 01:25

## 2022-07-03 RX ADMIN — INSULIN LISPRO 10 UNITS: 100 INJECTION, SOLUTION INTRAVENOUS; SUBCUTANEOUS at 17:07

## 2022-07-03 RX ADMIN — DILTIAZEM HYDROCHLORIDE 240 MG: 240 CAPSULE, EXTENDED RELEASE ORAL at 08:14

## 2022-07-03 RX ADMIN — VANCOMYCIN HYDROCHLORIDE 1250 MG: 10 INJECTION, POWDER, LYOPHILIZED, FOR SOLUTION INTRAVENOUS at 10:59

## 2022-07-03 RX ADMIN — OXYCODONE AND ACETAMINOPHEN 1 TABLET: 325; 10 TABLET ORAL at 07:32

## 2022-07-03 RX ADMIN — Medication 10 ML: at 20:54

## 2022-07-03 RX ADMIN — SOTALOL HYDROCHLORIDE 120 MG: 80 TABLET ORAL at 09:04

## 2022-07-03 RX ADMIN — ATORVASTATIN CALCIUM 20 MG: 10 TABLET, FILM COATED ORAL at 21:30

## 2022-07-03 RX ADMIN — CEFEPIME HYDROCHLORIDE 2 G: 2 INJECTION, POWDER, FOR SOLUTION INTRAVENOUS at 06:47

## 2022-07-03 RX ADMIN — OXYCODONE AND ACETAMINOPHEN 1 TABLET: 325; 10 TABLET ORAL at 21:12

## 2022-07-03 RX ADMIN — GABAPENTIN 300 MG: 300 CAPSULE ORAL at 15:05

## 2022-07-03 RX ADMIN — LEVETIRACETAM 500 MG: 500 TABLET, FILM COATED ORAL at 08:14

## 2022-07-03 RX ADMIN — FAMOTIDINE 40 MG: 20 TABLET, FILM COATED ORAL at 08:14

## 2022-07-03 RX ADMIN — CEFEPIME HYDROCHLORIDE 2 G: 2 INJECTION, POWDER, FOR SOLUTION INTRAVENOUS at 21:12

## 2022-07-03 RX ADMIN — DOCUSATE SODIUM 50 MG AND SENNOSIDES 8.6 MG 2 TABLET: 8.6; 5 TABLET, FILM COATED ORAL at 08:14

## 2022-07-03 RX ADMIN — INSULIN DETEMIR 20 UNITS: 100 INJECTION, SOLUTION SUBCUTANEOUS at 21:13

## 2022-07-03 RX ADMIN — GABAPENTIN 300 MG: 300 CAPSULE ORAL at 08:14

## 2022-07-03 RX ADMIN — CEFEPIME HYDROCHLORIDE 2 G: 2 INJECTION, POWDER, FOR SOLUTION INTRAVENOUS at 16:27

## 2022-07-03 RX ADMIN — LISINOPRIL 40 MG: 20 TABLET ORAL at 08:13

## 2022-07-03 RX ADMIN — LEVETIRACETAM 500 MG: 500 TABLET, FILM COATED ORAL at 21:30

## 2022-07-03 RX ADMIN — SOTALOL HYDROCHLORIDE 120 MG: 80 TABLET ORAL at 21:30

## 2022-07-03 RX ADMIN — GABAPENTIN 300 MG: 300 CAPSULE ORAL at 20:54

## 2022-07-03 NOTE — PROGRESS NOTES
"Pharmacy Dosing Service  Pharmacokinetics  Vancomycin Follow-up Evaluation     Assessment/Action/Plan:  Current Order: Vancomycin 1250 mg IVPB every 12 hours  Current end date:7/10/2022  Levels: Vancomycin trough ordered before dose due on 7/4/2022 at 1000  7/2/2022 1039 Vancomycin trough = 17.5 (12.25 hours post 1250 mg dose)  Additional antimicrobial agent(s): Cefepime  SCr = 0.86 (up from 0.71)   Continue Vancomycin 1250 mg iv q12h. Pharmacy will continue to follow daily and adjust dose accordingly.      Subjective:  Elijah Escobar is a 66 y.o. male currently on Vancomycin for the treatment of SSTI, day 3 of treatment.    AUC Model Data:  Regimen: 1250 mg IV every 12 hours.  Start time: 09:00 on 07/03/2022  Exposure target: AUC24 (range)400-600 mg/L.hr   AUC24,ss: 550 mg/L.hr  PAUC*: 96 %  Ctrough,ss: 18.3 mg/L  Pconc*: 35 %  Tox.: 15 %      Objective:  Ht: 177.8 cm (70\"); Wt: 123 kg (271 lb 9.6 oz)  Estimated Creatinine Clearance: 111.1 mL/min (by C-G formula based on SCr of 0.86 mg/dL).   Creatinine   Date Value Ref Range Status   07/03/2022 0.86 0.76 - 1.27 mg/dL Final   07/02/2022 0.71 (L) 0.76 - 1.27 mg/dL Final   07/01/2022 0.77 0.76 - 1.27 mg/dL Final   10/21/2020 1.03 0.60 - 1.30 mg/dL Final   02/07/2020 1.00 0.60 - 1.30 mg/dL Final   02/07/2020 1.00 0.60 - 1.30 mg/dL Final      Lab Results   Component Value Date    WBC 10.67 07/03/2022    WBC 13.63 (H) 07/02/2022    WBC 10.28 07/01/2022         Lab Results   Component Value Date    VANCOTROUGH 17.50 07/02/2022       Culture Results:  Microbiology Results (last 10 days)       Procedure Component Value - Date/Time    Tissue / Bone Culture - Tissue, Cranium [[id number]]  (Abnormal) Collected: 07/01/22 5714    Lab Status: Preliminary result Specimen: Tissue from Cranium Updated: 07/02/22 1319     Tissue Culture Scant growth (1+) Gram Negative Bacilli     Gram Stain Moderate (3+) WBCs seen      Moderate (3+) Red blood cells      No organisms seen    Wound " Culture - Wound, Cranium [[id number]]  (Abnormal) Collected: 07/01/22 1747    Lab Status: Preliminary result Specimen: Wound from Cranium Updated: 07/02/22 1318     Wound Culture Scant growth (1+) Gram Negative Bacilli     Gram Stain Many (4+) WBCs seen      No organisms seen    Blood Culture - Blood, Arm, Left [[id number]]  (Normal) Collected: 06/30/22 2030    Lab Status: Preliminary result Specimen: Blood from Arm, Left Updated: 07/02/22 2230     Blood Culture No growth at 2 days    Blood Culture - Blood, Arm, Left [[id number]]  (Normal) Collected: 06/30/22 2026    Lab Status: Preliminary result Specimen: Blood from Arm, Left Updated: 07/02/22 2230     Blood Culture No growth at 2 days    Urine Culture - Urine, Urine, Clean Catch [[id number]]  (Abnormal) Collected: 06/30/22 1947    Lab Status: Final result Specimen: Urine, Clean Catch Updated: 07/02/22 0933     Urine Culture <10,000 CFU/mL Gram Positive Cocci     Comment: Call if further workup needed.         Narrative:      Colonization of the urinary tract without infection is common. Treatment is discouraged unless the patient is symptomatic, pregnant, or undergoing an invasive urologic procedure.    Body Fluid Culture - Body Fluid, Scalp [[id number]]  (Abnormal) Collected: 06/30/22 1618    Lab Status: Preliminary result Specimen: Body Fluid from Scalp Updated: 07/02/22 1303     Body Fluid Culture Heavy growth (4+) Gram Negative Bacilli      Scant growth (1+) Normal Skin Asmita     Gram Stain Many (4+) WBCs per low power field      Few (2+) Gram negative bacilli    COVID-19, [facility],NASAL Swab (NO TRANSPORT MEDIA) 2 HR TAT - Swab, Nasopharynx [[id number]]  (Normal) Collected: 06/30/22 1230    Lab Status: Final result Specimen: Swab from Nasopharynx Updated: 06/30/22 1316     COVID19 Presumptive Negative    Narrative:      Fact sheet for providers: https://www.fda.gov/media/494830/download     Fact sheet for patients:  https://www.fda.gov/media/306357/download    Test performed by PCR.  If inconsistent with clinical signs and symptoms patient should be tested with different authorized molecular test.    Urine Culture - Urine, Urine, Clean Catch [[id number]]  (Normal) Collected: 06/23/22 2338    Lab Status: Final result Specimen: Urine, Clean Catch Updated: 06/25/22 1236     Urine Culture No growth    COVID PRE-OP / PRE-PROCEDURE SCREENING ORDER (NO ISOLATION) - Swab, Nasopharynx [[id number]]  (Normal) Collected: 06/23/22 2235    Lab Status: Final result Specimen: Swab from Nasopharynx Updated: 06/23/22 2314    Narrative:      The following orders were created for panel order COVID PRE-OP / PRE-PROCEDURE SCREENING ORDER (NO ISOLATION) - Swab, Nasopharynx.  Procedure                               Abnormality         Status                     ---------                               -----------         ------                     COVID-19 and FLU A/B PCR...[[id number]]  Normal              Final result                 Please view results for these tests on the individual orders.    COVID-19 and FLU A/B PCR - Swab, Nasopharynx [[id number]]  (Normal) Collected: 06/23/22 2235    Lab Status: Final result Specimen: Swab from Nasopharynx Updated: 06/23/22 2314     COVID19 Not Detected     Influenza A PCR Not Detected     Influenza B PCR Not Detected    Narrative:      Fact sheet for providers: https://www.fda.gov/media/344611/download    Fact sheet for patients: https://www.fda.gov/media/456853/download    Test performed by PCR.    Blood Culture - Blood, Arm, Right [[id number]]  (Normal) Collected: 06/23/22 2207    Lab Status: Final result Specimen: Blood from Arm, Right Updated: 06/28/22 2232     Blood Culture No growth at 5 days    Blood Culture - Blood, Arm, Left [[id number]]  (Normal) Collected: 06/23/22 2200    Lab Status: Final result Specimen: Blood from Arm, Left Updated: 06/28/22 2232     Blood Culture No growth at 5 days             Mk Stahl, PharmD   07/03/22 08:04 CDT

## 2022-07-03 NOTE — PROGRESS NOTES
Elijah Escobar  66 y.o.      Chief complaint:   Headache    Subjective  No events.  Headaches are controlled.  Tolerating all p.o.  Working physical therapy.    Temp:  [97.8 °F (36.6 °C)-98.4 °F (36.9 °C)] 98.1 °F (36.7 °C)  Heart Rate:  [] 103  Resp:  [16-18] 18  BP: (107-168)/() 160/138  Arterial Line BP: ()/() 147/74      Objective    Neurologic Exam     Mental Status   Oriented to person, place, and time.   Attention: normal.   Speech: speech is normal   Level of consciousness: alert  Knowledge: good.     Cranial Nerves     CN II   Visual fields full to confrontation.     CN III, IV, VI   Pupils are equal, round, and reactive to light.  Extraocular motions are normal.     CN V   Facial sensation intact.     CN VII   Facial expression full, symmetric.     CN VIII   CN VIII normal.     CN IX, X   CN IX normal.   CN X normal.     CN XI   CN XI normal.     CN XII   CN XII normal.     Motor Exam   Muscle bulk: normal  Overall muscle tone: normal  Right arm pronator drift: absent  Left arm pronator drift: absent    Strength   Strength 5/5 throughout.     Sensory Exam   Light touch normal.   Pinprick normal.     Gait, Coordination, and Reflexes     Gait  Gait: normal    Coordination   Finger to nose coordination: normal    Tremor   Resting tremor: absent  Intention tremor: absent  Action tremor: absent    Reflexes   Reflexes 2+ except as noted.       Lab Results (last 24 hours)     Procedure Component Value Units Date/Time    Wound Culture - Wound, Cranium [[id number]]  (Abnormal)  (Susceptibility) Collected: 07/01/22 1746    Specimen: Wound from Cranium Updated: 07/03/22 0931     Wound Culture Scant growth (1+) Serratia marcescens     Gram Stain Many (4+) WBCs seen      No organisms seen    Susceptibility      Serratia marcescens      STALIN      Cefepime Susceptible      Ceftazidime Susceptible      Ceftriaxone Susceptible      Gentamicin Susceptible      Levofloxacin Susceptible       Piperacillin + Tazobactam Susceptible      Tetracycline Resistant     Trimethoprim + Sulfamethoxazole Susceptible                    Linear View               Susceptibility Comments     Serratia marcescens    Cefazolin sensitivity will not be reported for Enterobacteriaceae in non-urine isolates. If cefazolin is preferred, please call the microbiology lab to request an E-test.  With the exception of urinary-sourced infections, aminoglycosides should not be used as monotherapy.             Tissue / Bone Culture - Tissue, Cranium [[id number]]  (Abnormal)  (Susceptibility) Collected: 07/01/22 1278    Specimen: Tissue from Cranium Updated: 07/03/22 0965     Tissue Culture Scant growth (1+) Serratia marcescens     Gram Stain Moderate (3+) WBCs seen      Moderate (3+) Red blood cells      No organisms seen    Susceptibility      Serratia marcescens      STALIN      Cefepime Susceptible      Ceftazidime Susceptible      Ceftriaxone Susceptible      Gentamicin Susceptible      Levofloxacin Susceptible      Piperacillin + Tazobactam Susceptible      Tetracycline Resistant     Trimethoprim + Sulfamethoxazole Susceptible                    Linear View               Susceptibility Comments     Serratia marcescens    Cefazolin sensitivity will not be reported for Enterobacteriaceae in non-urine isolates. If cefazolin is preferred, please call the microbiology lab to request an E-test.  With the exception of urinary-sourced infections, aminoglycosides should not be used as monotherapy.             POC Glucose Once [[id number]]  (Abnormal) Collected: 07/03/22 0723    Specimen: Blood Updated: 07/03/22 0734     Glucose 141 mg/dL      Comment: : [id number] Jaysondonavan AlmendarezMeter ID: [id number]       Basic Metabolic Panel [[id number]]  (Abnormal) Collected: 07/03/22 0324    Specimen: Blood Updated: 07/03/22 0441     Glucose 182 mg/dL      BUN 12 mg/dL      Creatinine 0.86 mg/dL      Sodium 137 mmol/L      Potassium 4.3 mmol/L      Chloride  102 mmol/L      CO2 28.0 mmol/L      Calcium 8.1 mg/dL      BUN/Creatinine Ratio 14.0     Anion Gap 7.0 mmol/L      eGFR 95.5 mL/min/1.73      Comment: National Kidney Foundation and American Society of Nephrology (ASN) Task Force recommended calculation based on the Chronic Kidney Disease Epidemiology Collaboration (CKD-EPI) equation refit without adjustment for race.       Narrative:      GFR Normal >60  Chronic Kidney Disease <60  Kidney Failure <15      Magnesium [[id number]]  (Normal) Collected: 07/03/22 0324    Specimen: Blood Updated: 07/03/22 0420     Magnesium 2.2 mg/dL     CBC & Differential [[id number]]  (Abnormal) Collected: 07/03/22 0324    Specimen: Blood Updated: 07/03/22 0401    Narrative:      The following orders were created for panel order CBC & Differential.  Procedure                               Abnormality         Status                     ---------                               -----------         ------                     CBC Auto Differential[[id number]]        Abnormal            Final result                 Please view results for these tests on the individual orders.    CBC Auto Differential [[id number]]  (Abnormal) Collected: 07/03/22 0324    Specimen: Blood Updated: 07/03/22 0401     WBC 10.67 10*3/mm3      RBC 3.33 10*6/mm3      Hemoglobin 10.5 g/dL      Hematocrit 32.5 %      MCV 97.6 fL      MCH 31.5 pg      MCHC 32.3 g/dL      RDW 12.9 %      RDW-SD 45.9 fl      MPV 9.4 fL      Platelets 266 10*3/mm3      Neutrophil % 71.0 %      Lymphocyte % 19.7 %      Monocyte % 8.2 %      Eosinophil % 0.2 %      Basophil % 0.2 %      Immature Grans % 0.7 %      Neutrophils, Absolute 7.59 10*3/mm3      Lymphocytes, Absolute 2.10 10*3/mm3      Monocytes, Absolute 0.87 10*3/mm3      Eosinophils, Absolute 0.02 10*3/mm3      Basophils, Absolute 0.02 10*3/mm3      Immature Grans, Absolute 0.07 10*3/mm3      nRBC 0.0 /100 WBC     Blood Culture - Blood, Arm, Left [[id number]]  (Normal) Collected:  06/30/22 2026    Specimen: Blood from Arm, Left Updated: 07/02/22 2230     Blood Culture No growth at 2 days    Blood Culture - Blood, Arm, Left [[id number]]  (Normal) Collected: 06/30/22 2030    Specimen: Blood from Arm, Left Updated: 07/02/22 2230     Blood Culture No growth at 2 days    POC Glucose Once [[id number]]  (Abnormal) Collected: 07/02/22 1631    Specimen: Blood Updated: 07/02/22 1642     Glucose 143 mg/dL      Comment: : [id number] Jayson PurvisoryMeter ID: [id number]       POC Glucose Once [[id number]]  (Abnormal) Collected: 07/02/22 1108    Specimen: Blood Updated: 07/02/22 1120     Glucose 175 mg/dL      Comment: : [id number] Iván OttealMeter ID: [id number]       Vancomycin, Trough [[id number]]  (Normal) Collected: 07/02/22 1039    Specimen: Blood Updated: 07/02/22 1111     Vancomycin Trough 17.50 mcg/mL               Plan:   Intracranial infection.  Cerebritis.  Culture results consistent with Serratia.  Currently on cefepime and vancomycin per ID.  Okay to transfer to floor.      Hypertension    GERD (gastroesophageal reflux disease)    Coronary artery disease    Type 2 diabetes mellitus with hyperglycemia and peripheral neuropathy, with long-term current use of insulin (HCC)    Paroxysmal atrial fibrillation with rapid ventricular response (HCC)    Post-operative infection    S/P craniotomy        Felipe Banerjee MD

## 2022-07-03 NOTE — PLAN OF CARE
Goal Outcome Evaluation:  Plan of Care Reviewed With: patient        Progress: improving  Outcome Evaluation: Pt alert and oriented x4, but can be confused at times. He woke up this afternoon and thought he was at his sister's house. But was easily reoriented. Assist when out of bed. walked in room and sat on side of bed. Dressing on head intact. Afebrile. Spoke with his wife this afternoon. Continue to monitor.

## 2022-07-03 NOTE — PROGRESS NOTES
[facility] Medicine Services  INPATIENT PROGRESS NOTE    Patient Name: Elijah Escobar  Date of Admission: 6/30/2022  Today's Date: 07/03/22  Length of Stay: 3  Primary Care Physician: yDlan Lama APRN    Subjective   Chief Complaint: Follow-up medical management    HPI   Patient seen sitting up in chair at bedside.  He has maintained off Cardene and Cardizem drips.  Heart rates have been good.  IV fluids still running which may be only stopped.  Blood pressure stable.  He still has some mild headache but nothing worse than before.  No vision changes.  No numbness or weakness.  No chest pain or shortness of breath.      Review of Systems   All pertinent negatives and positives are as above. All other systems have been reviewed and are negative unless otherwise stated.     Objective    Temp:  [97.8 °F (36.6 °C)-98.4 °F (36.9 °C)] 97.8 °F (36.6 °C)  Heart Rate:  [] 53  Resp:  [16-20] 16  BP: (107-168)/() 154/70  Arterial Line BP: ()/() 149/67  Physical Exam  GEN: Awake, alert, interactive, in NAD  HEENT:   Head is bandaged.  EOMI, Anicteric, Trachea midline  Lungs:  no wheezing/rales/rhonchi  Heart: irreg/irreg, +S1/s2, no rub  ABD: soft, nt/nd, +BS, no guarding/rebound  Extremities: atraumatic, no cyanosis  Skin: no rashes or lesions  Neuro: AAOx3, DENISE  Psych: normal mood & affect        Results Review:  I have reviewed the labs, radiology results, and diagnostic studies.    Laboratory Data:   Results from last 7 days   Lab Units 07/03/22  0324 07/02/22  0546 07/01/22  0556   WBC 10*3/mm3 10.67 13.63* 10.28   HEMOGLOBIN g/dL 10.5* 10.8* 9.4*   HEMATOCRIT % 32.5* 33.0* 30.5*   PLATELETS 10*3/mm3 266 314 280        Results from last 7 days   Lab Units 07/03/22  0324 07/02/22  0546 07/01/22  0556 06/30/22  1225   SODIUM mmol/L 137 135* 141 139   POTASSIUM mmol/L 4.3 4.4 3.9 4.5   CHLORIDE mmol/L 102 99 106 105   CO2 mmol/L 28.0 22.0 28.0 25.0   BUN  mg/dL 12 8 8 8   CREATININE mg/dL 0.86 0.71* 0.77 0.68*   CALCIUM mg/dL 8.1* 8.4* 8.8 8.8   BILIRUBIN mg/dL  --   --  0.2 0.3   ALK PHOS U/L  --   --  109 127*   ALT (SGPT) U/L  --   --  37 45*   AST (SGOT) U/L  --   --  19 23   GLUCOSE mg/dL 182* 228* 140* 202*       Culture Data:   No results found for: BLOODCX, URINECX, WOUNDCX, MRSACX, RESPCX, STOOLCX    Radiology Data:   Imaging Results (Last 24 Hours)     ** No results found for the last 24 hours. **          I have reviewed the patient's current medications.     Assessment/Plan     Active Hospital Problems    Diagnosis    • Post-operative infection    • S/P craniotomy      Added automatically from request for surgery [id number]     • Paroxysmal atrial fibrillation with rapid ventricular response (HCC)    • Coronary artery disease    • Type 2 diabetes mellitus with hyperglycemia and peripheral neuropathy, with long-term current use of insulin ([facility])    • Hypertension    • GERD (gastroesophageal reflux disease)        Plan:  #1 meningioma -status post recent craniotomy with concern for potential infection versus pseudomeningocele.  Taken to the OR on 7/1 for revision and washout of craniectomy with finding of purulent material down to brain suggesting encephalitis.  On Vanco and cefepime.  ID following.  Does not appear septic at this time.    #2 paroxysmal A. Fib/flutter -heart rates up postop yesterday and required Cardizem drip which is now on pause.  Seems to be going in the out of A. fib on the monitor but mostly sinus.  Overall doing well on Cardizem and sotalol.  Continue.  Not currently anticoagulated given above.    #3 DM2 -now eating better.  Low-dose Levemir was started last night.  We will increase from 15 units to 20 tonight.  Getting sliding scale and hypoglycemia protocol.    #4 hypertension -BP has been maintaining off Cardene.  IV fluids still running, should be okay to stop at this time as he is taking p.o. and there are no signs of sepsis.   Volume status seems good.  Continue with meds, monitor/ adjust as needed.    #5 GERD -currently has Pepcid ordered.      Electronically signed by Nima Bowman DO, 07/03/22, 07:47 CDT.

## 2022-07-03 NOTE — PLAN OF CARE
PT Alert/Oriented x 4 throughout shift.  Rested well throughout shift with no complaint other than head pain at surgical site x 1.  PRN meds effective in managing pain. SR/SB on monitor throughout most of shift.  Vital signs stable.  Boderline HTN but no need for PRN HTN meds to maintain WDL. No complaint of CP, SOB, or increased WOB noted.  No other issues or concerns noted. Will continue to monitor for acute changes in status

## 2022-07-04 LAB
GLUCOSE BLDC GLUCOMTR-MCNC: 157 MG/DL (ref 70–130)
GLUCOSE BLDC GLUCOMTR-MCNC: 169 MG/DL (ref 70–130)
GLUCOSE BLDC GLUCOMTR-MCNC: 197 MG/DL (ref 70–130)
GLUCOSE BLDC GLUCOMTR-MCNC: 236 MG/DL (ref 70–130)
VANCOMYCIN TROUGH SERPL-MCNC: 8.3 MCG/ML (ref 5–20)

## 2022-07-04 PROCEDURE — 63710000001 INSULIN DETEMIR PER 5 UNITS: Performed by: FAMILY MEDICINE

## 2022-07-04 PROCEDURE — 82962 GLUCOSE BLOOD TEST: CPT

## 2022-07-04 PROCEDURE — 97162 PT EVAL MOD COMPLEX 30 MIN: CPT | Performed by: PHYSICAL THERAPIST

## 2022-07-04 PROCEDURE — 99024 POSTOP FOLLOW-UP VISIT: CPT | Performed by: NEUROLOGICAL SURGERY

## 2022-07-04 PROCEDURE — 80202 ASSAY OF VANCOMYCIN: CPT | Performed by: NEUROLOGICAL SURGERY

## 2022-07-04 PROCEDURE — 63710000001 INSULIN LISPRO (HUMAN) PER 5 UNITS: Performed by: NEUROLOGICAL SURGERY

## 2022-07-04 PROCEDURE — 25010000002 CEFEPIME PER 500 MG: Performed by: NEUROLOGICAL SURGERY

## 2022-07-04 RX ADMIN — CEFEPIME HYDROCHLORIDE 2 G: 2 INJECTION, POWDER, FOR SOLUTION INTRAVENOUS at 21:20

## 2022-07-04 RX ADMIN — LEVETIRACETAM 500 MG: 500 TABLET, FILM COATED ORAL at 21:20

## 2022-07-04 RX ADMIN — CEFEPIME HYDROCHLORIDE 2 G: 2 INJECTION, POWDER, FOR SOLUTION INTRAVENOUS at 15:05

## 2022-07-04 RX ADMIN — DOCUSATE SODIUM 50 MG AND SENNOSIDES 8.6 MG 2 TABLET: 8.6; 5 TABLET, FILM COATED ORAL at 21:19

## 2022-07-04 RX ADMIN — GABAPENTIN 300 MG: 300 CAPSULE ORAL at 16:52

## 2022-07-04 RX ADMIN — INSULIN LISPRO 5 UNITS: 100 INJECTION, SOLUTION INTRAVENOUS; SUBCUTANEOUS at 12:13

## 2022-07-04 RX ADMIN — SOTALOL HYDROCHLORIDE 120 MG: 80 TABLET ORAL at 21:19

## 2022-07-04 RX ADMIN — FAMOTIDINE 40 MG: 20 TABLET, FILM COATED ORAL at 09:30

## 2022-07-04 RX ADMIN — Medication 10 ML: at 09:33

## 2022-07-04 RX ADMIN — ATORVASTATIN CALCIUM 20 MG: 10 TABLET, FILM COATED ORAL at 21:19

## 2022-07-04 RX ADMIN — INSULIN LISPRO 3 UNITS: 100 INJECTION, SOLUTION INTRAVENOUS; SUBCUTANEOUS at 09:27

## 2022-07-04 RX ADMIN — OXYCODONE AND ACETAMINOPHEN 1 TABLET: 325; 10 TABLET ORAL at 21:20

## 2022-07-04 RX ADMIN — DILTIAZEM HYDROCHLORIDE 240 MG: 240 CAPSULE, EXTENDED RELEASE ORAL at 09:30

## 2022-07-04 RX ADMIN — CEFEPIME HYDROCHLORIDE 2 G: 2 INJECTION, POWDER, FOR SOLUTION INTRAVENOUS at 05:41

## 2022-07-04 RX ADMIN — GABAPENTIN 300 MG: 300 CAPSULE ORAL at 09:29

## 2022-07-04 RX ADMIN — INSULIN DETEMIR 25 UNITS: 100 INJECTION, SOLUTION SUBCUTANEOUS at 21:21

## 2022-07-04 RX ADMIN — OXYCODONE AND ACETAMINOPHEN 1 TABLET: 325; 10 TABLET ORAL at 09:32

## 2022-07-04 RX ADMIN — OXYCODONE AND ACETAMINOPHEN 1 TABLET: 325; 10 TABLET ORAL at 03:45

## 2022-07-04 RX ADMIN — GABAPENTIN 300 MG: 300 CAPSULE ORAL at 21:20

## 2022-07-04 RX ADMIN — LISINOPRIL 40 MG: 20 TABLET ORAL at 09:31

## 2022-07-04 RX ADMIN — LEVETIRACETAM 500 MG: 500 TABLET, FILM COATED ORAL at 09:30

## 2022-07-04 RX ADMIN — Medication 10 ML: at 21:20

## 2022-07-04 RX ADMIN — DOCUSATE SODIUM 50 MG AND SENNOSIDES 8.6 MG 2 TABLET: 8.6; 5 TABLET, FILM COATED ORAL at 09:32

## 2022-07-04 RX ADMIN — SOTALOL HYDROCHLORIDE 120 MG: 80 TABLET ORAL at 09:31

## 2022-07-04 RX ADMIN — INSULIN LISPRO 3 UNITS: 100 INJECTION, SOLUTION INTRAVENOUS; SUBCUTANEOUS at 16:53

## 2022-07-04 RX ADMIN — OXYCODONE AND ACETAMINOPHEN 1 TABLET: 325; 10 TABLET ORAL at 16:52

## 2022-07-04 NOTE — THERAPY EVALUATION
Patient Name: Elijah Escobar  : 1955    MRN: [id number]                              Today's Date: 2022       Admit Date: 2022    Visit Dx:     ICD-10-CM ICD-9-CM   1. Postoperative infection, unspecified type, initial encounter  T81.40XA 998.59   2. S/P craniotomy  Z98.890 V45.89     Patient Active Problem List   Diagnosis   • Meningioma s/p craniotomy   • Hypertension   • GERD (gastroesophageal reflux disease)   • Coronary artery disease   • Morbid obesity (HCC)   • Type 2 diabetes mellitus with hyperglycemia and peripheral neuropathy, with long-term current use of insulin (HCC)   • Leukocytosis   • Other specified anemias   • Paroxysmal atrial fibrillation with rapid ventricular response (HCC)   • Atrial fibrillation with RVR (HCC)   • Post-operative infection   • S/P craniotomy     Past Medical History:   Diagnosis Date   • Brain tumor (HCC)    • Coronary artery disease    • COVID-19 vaccine series completed     MODERNA X 3; LAST DOSE 3/2022   • Diabetes (HCC)    • Erectile dysfunction    • GERD (gastroesophageal reflux disease)    • Hypercholesteremia    • Hypertension      Past Surgical History:   Procedure Laterality Date   • BALLOON ANGIOPLASTY, ARTERY Right    • COLONOSCOPY     • CRANIOTOMY FOR TUMOR Right 2022    Procedure: CRANIOTOMY FOR TUMOR STERIOTACTIC WITH BRAIN LAB, right;  Surgeon: Flaco Portillo MD;  Location: [facility];  Service: Neurosurgery;  Laterality: Right;      General Information     Row Name 22 112          Physical Therapy Time and Intention    Document Type evaluation  -KR     Mode of Treatment physical therapy  -KR     Row Name 22 112          General Information    Patient Profile Reviewed yes  -KR     Prior Level of Function independent:;all household mobility;community mobility;driving  uses cane for walking  -KR     Existing Precautions/Restrictions fall  -KR     Row Name 22 112          Home Main Entrance    Number of Stairs,  Main Entrance none  -KR     Row Name 07/04/22 1122          Cognition    Orientation Status (Cognition) oriented to;person;place;disoriented to;situation;time  -KR     Row Name 07/04/22 1122          Safety Issues, Functional Mobility    Safety Issues Affecting Function (Mobility) other (see comments)  slight confusion at times, increased cues/time for directions  -KR     Impairments Affecting Function (Mobility) balance;cognition;endurance/activity tolerance  -KR     Cognitive Impairments, Mobility Safety/Performance sequencing abilities  -KR           User Key  (r) = Recorded By, (t) = Taken By, (c) = Cosigned By    Initials Name Provider Type    Prabha Reyna, PT DPT Physical Therapist               Mobility     Row Name 07/04/22 1122          Bed Mobility    Bed Mobility supine-sit-supine  -KR     Supine-Sit-Supine [place] (Bed Mobility) standby assist;contact guard  -KR     Assistive Device (Bed Mobility) bed rails  -KR     Row Name 07/04/22 1122          Sit-Stand Transfer    Sit-Stand [place] (Transfers) contact guard  -KR     Row Name 07/04/22 1122          Gait/Stairs (Locomotion)    [place] Level (Gait) contact guard;minimum assist (75% patient effort)  HHA  -KR     Distance in Feet (Gait) 5  at edge of bed  -KR     Deviations/Abnormal Patterns (Gait) gait speed decreased;stride length decreased  -KR     Bilateral Gait Deviations forward flexed posture  -KR           User Key  (r) = Recorded By, (t) = Taken By, (c) = Cosigned By    Initials Name Provider Type    Prabha Reyna, PT DPT Physical Therapist               Obj/Interventions     Row Name 07/04/22 1122          Range of Motion Comprehensive    General Range of Motion bilateral lower extremity ROM WNL  -KR     Row Name 07/04/22 1122          Strength Comprehensive (MMT)    Comment, General Manual Muscle Testing (MMT) Assessment funtcionally 4-/5  -KR     Row Name 07/04/22 1122          Balance    Balance Assessment  sitting dynamic balance;standing dynamic balance;standing static balance  -KR     Dynamic Sitting Balance standby assist  -KR     Position, Sitting Balance sitting edge of bed  -KR     Static Standing Balance standby assist  -KR     Dynamic Standing Balance contact guard;minimal assist  HHA  -KR     Position/Device Used, Standing Balance supported  -KR           User Key  (r) = Recorded By, (t) = Taken By, (c) = Cosigned By    Initials Name Provider Type    Prabha Reyna, PT DPT Physical Therapist               Goals/Plan     Row Name 07/04/22 1122          Bed Mobility Goal 1 (PT)    Activity/Assistive Device (Bed Mobility Goal 1, PT) bed mobility activities, all  -KR     [place] Level/Cues Needed (Bed Mobility Goal 1, PT) independent  -KR     Time Frame (Bed Mobility Goal 1, PT) 10 days  -KR     Progress/Outcomes (Bed Mobility Goal 1, PT) goal ongoing  -KR     Row Name 07/04/22 1122          Transfer Goal 1 (PT)    Activity/Assistive Device (Transfer Goal 1, PT) transfers, all  -KR     [place] Level/Cues Needed (Transfer Goal 1, PT) independent  -KR     Time Frame (Transfer Goal 1, PT) 10 days  -KR     Progress/Outcome (Transfer Goal 1, PT) goal ongoing  -KR     Row Name 07/04/22 1122          Gait Training Goal 1 (PT)    Activity/Assistive Device (Gait Training Goal 1, PT) gait (walking locomotion);assistive device use;decrease fall risk;improve balance and speed;increase endurance/gait distance;cane, straight  pt used cane in past  -KR     [place] Level (Gait Training Goal 1, PT) standby assist  -KR     Distance (Gait Training Goal 1, PT) 250  -KR     Time Frame (Gait Training Goal 1, PT) 10 days  -KR     Progress/Outcome (Gait Training Goal 1, PT) goal ongoing  -KR     Row Name 07/04/22 1122          Patient Education Goal (PT)    Activity (Patient Education Goal, PT) fall risk, use of AD as needed, increasing activity  -KR     [place]/Cues/Accuracy (Memory Goal 2, PT) demonstrates  adequately;verbalizes understanding  -KR     Time Frame (Patient Education Goal, PT) 10 days  -KR     Progress/Outcome (Patient Education Goal, PT) goal ongoing  -KR     Row Name 07/04/22 1122          Therapy Assessment/Plan (PT)    Planned Therapy Interventions (PT) balance training;bed mobility training;gait training;home exercise program;patient/family education;neuromuscular re-education;postural re-education;strengthening;stretching;stair training;transfer training  -KR           User Key  (r) = Recorded By, (t) = Taken By, (c) = Cosigned By    Initials Name Provider Type    KR Prabha Singh, PT DPT Physical Therapist               Clinical Impression     Row Name 07/04/22 1122          Pain    Pretreatment Pain Rating 10/10  -KR     Posttreatment Pain Rating 10/10  -KR     Pain Location - head  -KR     Pain Intervention(s) Medication (See MAR);Repositioned;Ambulation/increased activity  -KR     Row Name 07/04/22 1122          Plan of Care Review    Plan of Care Reviewed With patient  -KR     Outcome Evaluation Physical therapy initial evaluation performed. Pt fatigued quickly and had a overall decreased tolerance to activity. He was dizzy upon standing and slightly unsteady. He did seem confused at times and needed increased cues and time for directions. Skilled PT to work on these deficits. Likely will need further PT upon d/c. HH vs SNF placement depending on assist at home.  -KR     Row Name 07/04/22 1122          Therapy Assessment/Plan (PT)    Patient/Family Therapy Goals Statement (PT) return home  -KR     Rehab Potential (PT) good, to achieve stated therapy goals  -KR     Criteria for Skilled Interventions Met (PT) yes  -KR     Therapy Frequency (PT) 2 times/day  -KR     Predicted Duration of Therapy Intervention (PT) until d/c  -KR     Row Name 07/04/22 1122          Positioning and Restraints    Pre-Treatment Position in bed  -KR     Post Treatment Position bed  -KR     In Bed fowlers;call  light within reach;encouraged to call for assist;exit alarm on;notified nsg  -KR           User Key  (r) = Recorded By, (t) = Taken By, (c) = Cosigned By    Initials Name Provider Type    Prabha Reyna, PT DPT Physical Therapist               Outcome Measures     Row Name 07/04/22 1122          How much help from another person do you currently need...    Turning from your back to your side while in flat bed without using bedrails? 3  -KR     Moving from lying on back to sitting on the side of a flat bed without bedrails? 3  -KR     Moving to and from a bed to a chair (including a wheelchair)? 3  -KR     Standing up from a chair using your arms (e.g., wheelchair, bedside chair)? 3  -KR     Climbing 3-5 steps with a railing? 2  -KR     To walk in hospital room? 2  -KR     AM-PAC 6 Clicks Score (PT) 16  -KR     Highest level of mobility 5 --> Static standing  -KR     Row Name 07/04/22 1122          Functional Assessment    Outcome Measure Options AM-PAC 6 Clicks Basic Mobility (PT)  -KR           User Key  (r) = Recorded By, (t) = Taken By, (c) = Cosigned By    Initials Name Provider Type    Prabha Reyna PT DPT Physical Therapist                             Physical Therapy Education                 Title: PT OT SLP Therapies (In Progress)     Topic: Physical Therapy (In Progress)     Point: Mobility training (Done)     Learning Progress Summary           Patient Acceptance, E, VU by SHAUNA at 7/4/2022 1152    Comment: safety, POC                   Point: Home exercise program (Not Started)     Learner Progress:  Not documented in this visit.          Point: Body mechanics (Not Started)     Learner Progress:  Not documented in this visit.          Point: Precautions (Not Started)     Learner Progress:  Not documented in this visit.                      User Key     Initials Effective Dates Name Provider Type Discipline    SHAUNA 06/16/21 -  Prabha Singh PT DPT Physical Therapist PT              PT  Recommendation and Plan  Planned Therapy Interventions (PT): balance training, bed mobility training, gait training, home exercise program, patient/family education, neuromuscular re-education, postural re-education, strengthening, stretching, stair training, transfer training  Plan of Care Reviewed With: patient  Outcome Evaluation: Physical therapy initial evaluation performed. Pt fatigued quickly and had a overall decreased tolerance to activity. He was dizzy upon standing and slightly unsteady. He did seem confused at times and needed increased cues and time for directions. Skilled PT to work on these deficits. Likely will need further PT upon d/c. HH vs SNF placement depending on assist at home.     Time Calculation:    PT Charges     Row Name 07/04/22 1153             Time Calculation    Start Time 1105  -KR      Stop Time 1145  -KR      Time Calculation (min) 40 min  -KR      PT Received On 07/04/22  -KR      PT Goal Re-Cert Due Date 07/14/22  -KR            User Key  (r) = Recorded By, (t) = Taken By, (c) = Cosigned By    Initials Name Provider Type    Prabha Reyna, PT DPT Physical Therapist              Therapy Charges for Today     Code Description Service Date Service Provider Modifiers Qty    16072127464  PT EVAL MOD COMPLEXITY 3 7/4/2022 Prabha Singh, PT DPT GP 1          PT G-Codes  Outcome Measure Options: AM-PAC 6 Clicks Basic Mobility (PT)  AM-PAC 6 Clicks Score (PT): 16    Prabha Singh PT DPT  7/4/2022

## 2022-07-04 NOTE — PROGRESS NOTES
[facility] Medicine Services  INPATIENT PROGRESS NOTE    Patient Name: Elijah Escobar  Date of Admission: 6/30/2022  Today's Date: 07/04/22  Length of Stay: 4  Primary Care Physician: Dylan Lama APRN    Subjective   Chief Complaint: elevated blood sugar  HPI   Resting in bed, appears tired and mildly ill. Does not offer any new complaints.    Review of Systems   All pertinent negatives and positives are as above. All other systems have been reviewed and are negative unless otherwise stated.     Objective    Temp:  [98 °F (36.7 °C)-99 °F (37.2 °C)] 99 °F (37.2 °C)  Heart Rate:  [] 114  Resp:  [18] 18  BP: (120-139)/(64-88) 127/74  Physical Exam  GEN: Awake, alert, interactive, in NAD  HEENT:   Head is bandaged.  EOMI, Anicteric, Trachea midline  Lungs:  no wheezing/rales/rhonchi  Heart: irreg/irreg, +S1/s2, no rub  ABD: soft, nt/nd, +BS, no guarding/rebound  Extremities: atraumatic, no cyanosis  Skin: no rashes or lesions  Neuro: AAOx3, DENISE  Psych: normal mood & affect    Results Review:  I have reviewed the labs, radiology results, and diagnostic studies.    Laboratory Data:   Results from last 7 days   Lab Units 07/03/22  0324 07/02/22  0546 07/01/22  0556   WBC 10*3/mm3 10.67 13.63* 10.28   HEMOGLOBIN g/dL 10.5* 10.8* 9.4*   HEMATOCRIT % 32.5* 33.0* 30.5*   PLATELETS 10*3/mm3 266 314 280        Results from last 7 days   Lab Units 07/03/22  0324 07/02/22  0546 07/01/22  0556 06/30/22  1225   SODIUM mmol/L 137 135* 141 139   POTASSIUM mmol/L 4.3 4.4 3.9 4.5   CHLORIDE mmol/L 102 99 106 105   CO2 mmol/L 28.0 22.0 28.0 25.0   BUN mg/dL 12 8 8 8   CREATININE mg/dL 0.86 0.71* 0.77 0.68*   CALCIUM mg/dL 8.1* 8.4* 8.8 8.8   BILIRUBIN mg/dL  --   --  0.2 0.3   ALK PHOS U/L  --   --  109 127*   ALT (SGPT) U/L  --   --  37 45*   AST (SGOT) U/L  --   --  19 23   GLUCOSE mg/dL 182* 228* 140* 202*       Culture Data:   Blood Culture   Date Value Ref Range Status   06/30/2022  No growth at 3 days  Preliminary   06/30/2022 No growth at 3 days  Preliminary     Urine Culture   Date Value Ref Range Status   06/30/2022 <10,000 CFU/mL Gram Positive Cocci (A)  Final     Comment:     Call if further workup needed.       Wound Culture   Date Value Ref Range Status   07/01/2022 Scant growth (1+) Serratia marcescens (A)  Final       Radiology Data:   Imaging Results (Last 24 Hours)     ** No results found for the last 24 hours. **          I have reviewed the patient's current medications.     Assessment/Plan     Active Hospital Problems    Diagnosis    • Post-operative infection    • S/P craniotomy      Added automatically from request for surgery [id number]     • Paroxysmal atrial fibrillation with rapid ventricular response (HCC)    • Coronary artery disease    • Type 2 diabetes mellitus with hyperglycemia and peripheral neuropathy, with long-term current use of insulin ([facility])    • Hypertension    • GERD (gastroesophageal reflux disease)        Plan:  Status post craniotomy > Per neurosurgery  Suspected epidural infection > ID input noted  DM uncontrolled > Levemir to 25 units BID  Humalog sliding scale    PAF > Off Cardizem drip to Sotalol    Will continue to follow    Discharge Planning: Per attending.    Electronically signed by Angel Sarabia MD, 07/04/22, 15:23 CDT.

## 2022-07-04 NOTE — PAYOR COMM NOTE
"ADMIT INPT 6-30-22  UR  [phone]  [id number]    Virgil Escobar (66 y.o. Male)             Date of Birth   1955    Social Security Number       Address   [address] [place] [id number]    Home Phone   [phone]    MRN   [id number]       Advent   Non-Adventist    Marital Status                               Admission Date   6/30/22    Admission Type   Urgent    Admitting Provider   Felipe Banerjee MD    Attending Provider   Felipe Banerjee MD    Department, Room/Bed   [facility] 3A, 330/1       Discharge Date       Discharge Disposition       Discharge Destination                               Attending Provider: Felipe Banerjee MD    Allergies: No Known Allergies    Isolation: None   Infection: COVID (rule out) (06/30/22)   Code Status: CPR   Advance Care Planning Activity    Ht: 177.8 cm (70\")   Wt: 123 kg (271 lb 9.6 oz)    Admission Cmt: None   Principal Problem: None                Active Insurance as of 6/30/2022     Primary Coverage     Payor Plan Insurance Group Employer/Plan Group    ANTHEM MEDICARE REPLACEMENT ANTHEM MEDICARE ADVANTAGE [id number]     Payor Plan Address Payor Plan Phone Number Payor Plan Fax Number Effective Dates    [address] [phone]  1/1/2022 - None Entered    [facility] [id number]       Subscriber Name Subscriber Birth Date Member ID       VIRGIL ESCOBAR 1955 [id number]                 Emergency Contacts      (Rel.) Home Phone Work Phone Mobile Phone    MAYTE ESCOBAR (Spouse) [phone] -- --    david lua (Sister) [phone] -- --    Manju Lua (Relative) -- -- [phone]               History & Physical      Felipe Banerjee MD at 07/01/22 0819          NEUROSURGERY INITIAL HOSPITAL ENCOUNTER    Assessment/Plan:   Virgil Escobar is a 66 y.o. male with a significant medical history of recent craniotomy for tumor, 6/16/2022, coronary artery disease, A. fib, diabetes, erectile dysfunction, " GERD, hypertension, hyperlipidemia. He presents with a new problem of a persistent right sided headache, particularly over the right temporal region and dizziness with standing.  Physical exam findings of tense but fluctuant area of edema from the mid frontal to right parietal region of the scalp, the area is tender to palpation, periorbital lesions with purulent drainage from the left eye, scalp incision is clean, dry, and intact, bright and awake, oriented x3, speech is clear, follows commands without prompting showing thumbs up and 2 fingers bilaterally, no pronator drift, left dysmetria, gross hyporeflexia, and bilateral Babinski.  CT of the head from 6/23/2022, showing expected postsurgical changes to the right frontal lobe, small amount of fluid collection and pneumocephalus in the right subdural space, small amount of vasogenic edema with 4.3 mm left-to-right midline shift.  Repeat CT of the head obtained 6/30/2022 shows no acute intracranial blood products, unresolved vasogenic edema, midline shift resolved, increased left frontal and temporal soft tissue swelling.  MRI of the brain from 6/30/2022 shows a 7.9 x 6.5 x 0.9 cm rim-enhancing fluid collection overlying the bone flap concerning for pseudomeningocele versus infection.    Differential Diagnosis:   Status post craniotomy for WHO grade 1 meningioma  Headache  Orthostatic dizziness  Concern for postoperative infection     Medical decision making  Mr. Escobar presented to [facility] ED with a complaint of progressively worsening right frontal and temporal headache and orthostatic dizziness.  He remains neurologically at baseline, however his imaging shows a 7.9 x 6.5 x 0.9 concerning for pseudomeningocele versus infection.   Mr. Escobar right frontal and temporal scalp was cleaned with chlorhexidine x2.  A large bore needle was inserted into the soft tissue via aseptic technique. 10 mL of purulent fluid was obtained and sent to lab for fluid  culture. Hematologic values: WBC 14.28; CRP 5.38; UA 1+ leuks with trace bacteria;  body fluid culture 4+ WBCs, 2+ gram-negative bacilli; BC NGTD, lactic acid unremarkable.  Considering these findings, recommendations as follows...    Recommendations:  · Admit to 3A  · Neurochecks per policy.  Call for decline  · Routine labs, CBC, CMP, CRP, UA, lactate, PT, PTT, blood cultures x2.  · Began vancomycin and cefepime after blood cultures  · Cleared for surgery by hospitalist with RCRI score of 14.4%.  Benefits will outweigh the risk.  · Preoperative EKG and chest x-ray  · N.p.o. after midnight  · Plan for surgical I&D and washout and possible craniectomy  · Additional recommendations to follow-up with Dr. Banerjee     I discussed the patients findings and my recommendations with patient    Thank you very much for this interesting consult.     Level of Risk: High due to:  abrupt change in neurological status  MDM: High  Mod = 93224, Xsgp=[id number]  ___________________________________________________________________    Reason for consult: Headache, orthostatic dizziness, status postcraniotomy for tumor  Consultation at the request of No Known Provider.    Chief Complaint:   Chief Complaint   Patient presents with   • RE-EVALUATION     HPI: Elijah Escobar is a 66 y.o. male with a significant medical history of recent craniotomy for tumor, 6/16/2022, coronary artery disease, A. fib, diabetes, erectile dysfunction, GERD, hypertension, hyperlipidemia. He presents with a new problem of a persistent right sided headache, particularly over the right temporal region and dizziness with standing.      On 6/16/2022 the patient was brought to the main operating room where he underwent an uneventful craniotomy for tumor removal.  Postoperatively he  did extremely well and his neurological exam remained unchanged.  He was kept in the hospital for close neurologic monitoring, airway observation, and for pain control.  His JUAN drain has been  removed.  He has been able to ambulate, tolerate oral diet, oral pain medications and void.     Prior to discharge Mr. Escobar experience an episode of A. fib with RVR.  He was evaluated by both hospital services and cardiology.  He was initially placed on a Cardizem drip and carvedilol was transitioned back to sotalol.  An echocardiogram was obtained and he was found to have a left ventricular ejection fraction of 55-60% with normal left ventricular systolic function.  His pulse rate normalized and he was deemed safe for discharge home by cardiology.       Mr. Escobar return to [facility] on 6/24/2022 with A. fib with RVR and was subsequently found to have a urinary tract infection.  He has since been discharged with reports of well-controlled heart rate and blood pressure.     He returns with complaints of persistent right-sided headache and dizziness with standing. He additionally reports 1 fall from standing height while packing groceries.  He denies hitting his head or loss of consciousness.  States he completed full course of steroid taper and is taking Keppra as prescribed.  He additionally denies fevers, chills, seizure-like activity, visual disturbances, difficulty with words or word finding, facial asymmetry or dysesthesias, unilateral weakness, nausea or vomiting.    Review of Systems   Constitutional: Negative for chills and fever.   HENT: Negative.    Eyes: Negative.    Respiratory: Negative.    Cardiovascular: Negative.    Gastrointestinal: Negative.    Endocrine: Negative.    Genitourinary: Negative.    Musculoskeletal: Positive for gait problem.   Skin: Negative.    Allergic/Immunologic: Negative.    Neurological: Positive for dizziness. Negative for tremors, seizures, facial asymmetry, speech difficulty, weakness, numbness and headaches.   Hematological: Negative.    Psychiatric/Behavioral: Negative.    All other systems reviewed and are negative.     Past Medical History:  has a past  medical history of Brain tumor (HCC), Coronary artery disease, COVID-19 vaccine series completed, Diabetes (HCC), Erectile dysfunction, GERD (gastroesophageal reflux disease), Hypercholesteremia, and Hypertension.    Past Surgical History:  has a past surgical history that includes Colonoscopy; Balloon angioplasty, artery (Right); and Craniotomy for Tumor (Right, 6/16/2022).    Family History: family history includes Cancer in his mother; Heart disease in his father.    Social History:  reports that he has been smoking. He has been smoking about 0.50 packs per day. He has never used smokeless tobacco. He reports that he does not drink alcohol and does not use drugs.    Allergies: Patient has no known allergies.    Home Medications:   Current Facility-Administered Medications:   •  acetaminophen (TYLENOL) tablet 650 mg, 650 mg, Oral, Q4H PRN **OR** acetaminophen (TYLENOL) 160 MG/5ML solution 650 mg, 650 mg, Oral, Q4H PRN **OR** acetaminophen (TYLENOL) suppository 650 mg, 650 mg, Rectal, Q4H PRN, RusRahul farooq, APRN  •  atorvastatin (LIPITOR) tablet 20 mg, 20 mg, Oral, Nightly, Rust, Rahul OLMEDO, APRN, 20 mg at 06/30/22 2258  •  sennosides-docusate (PERICOLACE) 8.6-50 MG per tablet 2 tablet, 2 tablet, Oral, BID, 2 tablet at 06/30/22 2257 **AND** polyethylene glycol (MIRALAX) packet 17 g, 17 g, Oral, Daily PRN **AND** bisacodyl (DULCOLAX) EC tablet 5 mg, 5 mg, Oral, Daily PRN **AND** bisacodyl (DULCOLAX) suppository 10 mg, 10 mg, Rectal, Daily PRN, RustRahul, APRN  •  cefepime (MAXIPIME) 2 g/100 mL 0.9% NS (mbp), 2 g, Intravenous, Q8H, RustRahul, APRN, 2 g at 07/01/22 0542  •  dextrose (D50W) (25 g/50 mL) IV injection 25 g, 25 g, Intravenous, Q15 Min PRN, Federicot, Rahul M, APRN  •  dextrose (GLUTOSE) oral gel 15 g, 15 g, Oral, Q15 Min PRN, Rahul Arnold APRN  •  dilTIAZem CD (CARDIZEM CD) 24 hr capsule 240 mg, 240 mg, Oral, Q24H, Rahul Arnold APRN  •  famotidine (PEPCID) tablet 40 mg, 40 mg, Oral, Daily, Rust,  ADITYA Zhu  •  gabapentin (NEURONTIN) capsule 300 mg, 300 mg, Oral, TID, RusRahul farooq APRN, 300 mg at 06/30/22 2301  •  glucagon (human recombinant) (GLUCAGEN DIAGNOSTIC) injection 1 mg, 1 mg, Intramuscular, Q15 Min PRN, Rahul Arnold APRN  •  Insulin Lispro (humaLOG) injection 3-14 Units, 3-14 Units, Subcutaneous, TID AC, Rahul Arnold APRN  •  labetalol (NORMODYNE,TRANDATE) injection 10 mg, 10 mg, Intravenous, Q6H PRN, Rahul Arnold APRN  •  levETIRAcetam (KEPPRA) tablet 500 mg, 500 mg, Oral, BID, RusRahul farooq APRN, 500 mg at 06/30/22 2257  •  lisinopril (PRINIVIL,ZESTRIL) tablet 40 mg, 40 mg, Oral, Daily, Rahul Arnold APRN  •  ondansetron (ZOFRAN) tablet 4 mg, 4 mg, Oral, Q6H PRN **OR** ondansetron (ZOFRAN) injection 4 mg, 4 mg, Intravenous, Q6H PRN, Rahul Arnold APRN  •  oxyCODONE-acetaminophen (PERCOCET)  MG per tablet 1 tablet, 1 tablet, Oral, Q4H PRN, Rahul Arnold APRN  •  oxyCODONE-acetaminophen (PERCOCET) 7.5-325 MG per tablet 1 tablet, 1 tablet, Oral, Q4H PRN, Rahul Arnold APRN, 1 tablet at 07/01/22 0115  •  sodium chloride 0.9 % flush 10 mL, 10 mL, Intravenous, Q12H, Rahul rAnold APRN  •  sodium chloride 0.9 % flush 10 mL, 10 mL, Intravenous, PRN, Rahul Arnold APRN  •  sotalol (BETAPACE) tablet 120 mg, 120 mg, Oral, BID, RusRahul farooq APRN, 120 mg at 06/30/22 2257  •  [COMPLETED] vancomycin (VANCOCIN) 2,500 mg in sodium chloride 0.9 % 500 mL IVPB, 20 mg/kg, Intravenous, Once, 2,500 mg at 06/30/22 2328 **FOLLOWED BY** vancomycin 1250 mg/250 mL 0.9% NS IVPB ([facility]), 1,250 mg, Intravenous, Q12H, Felipe Banerjee MD    Medications: Scheduled Meds:atorvastatin, 20 mg, Oral, Nightly  cefepime, 2 g, Intravenous, Q8H  dilTIAZem CD, 240 mg, Oral, Q24H  famotidine, 40 mg, Oral, Daily  gabapentin, 300 mg, Oral, TID  insulin lispro, 3-14 Units, Subcutaneous, TID AC  levETIRAcetam, 500 mg, Oral, BID  lisinopril, 40 mg, Oral, Daily  senna-docusate sodium, 2 tablet, Oral, BID  sodium  chloride, 10 mL, Intravenous, Q12H  sotalol, 120 mg, Oral, BID  vancomycin, 1,250 mg, Intravenous, Q12H      Continuous Infusions:   PRN Meds:.•  acetaminophen **OR** acetaminophen **OR** acetaminophen  •  senna-docusate sodium **AND** polyethylene glycol **AND** bisacodyl **AND** bisacodyl  •  dextrose  •  dextrose  •  glucagon (human recombinant)  •  labetalol  •  ondansetron **OR** ondansetron  •  oxyCODONE-acetaminophen  •  oxyCODONE-acetaminophen  •  sodium chloride    Vital Signs  Temp:  [97.8 °F (36.6 °C)-98.8 °F (37.1 °C)] 98.1 °F (36.7 °C)  Heart Rate:  [61-79] 61  Resp:  [16-20] 20  BP: (137-175)/(58-82) 144/69    Physical Exam  Physical Exam  Vitals and nursing note reviewed.   Constitutional:       General: He is not in acute distress.     Appearance: Normal appearance. He is well-developed and well-groomed. He is morbidly obese. He is not ill-appearing, toxic-appearing or diaphoretic.      Comments: BMI 39.66   HENT:      Head: Normocephalic and atraumatic.        Right Ear: Hearing normal.      Left Ear: Hearing normal.   Eyes:      Extraocular Movements: EOM normal.      Conjunctiva/sclera: Conjunctivae normal.      Pupils: Pupils are equal, round, and reactive to light.   Neck:      Trachea: Trachea normal.   Cardiovascular:      Rate and Rhythm: Normal rate and regular rhythm.   Pulmonary:      Effort: Pulmonary effort is normal. No tachypnea, bradypnea, accessory muscle usage or respiratory distress.   Abdominal:      Palpations: Abdomen is soft.   Musculoskeletal:      Cervical back: Full passive range of motion without pain and neck supple.   Skin:     General: Skin is warm and dry.   Neurological:      Mental Status: He is alert and oriented to person, place, and time.      GCS: GCS eye subscore is 4. GCS verbal subscore is 5. GCS motor subscore is 6.      Gait: Gait is intact.      Deep Tendon Reflexes:      Reflex Scores:       Tricep reflexes are 1+ on the right side and 1+ on the left  side.       Bicep reflexes are 1+ on the right side and 1+ on the left side.       Brachioradialis reflexes are 1+ on the right side and 1+ on the left side.       Patellar reflexes are 1+ on the right side and 1+ on the left side.       Achilles reflexes are 1+ on the right side and 1+ on the left side.  Psychiatric:         Speech: Speech normal.         Behavior: Behavior normal. Behavior is cooperative.       Neurologic Exam     Mental Status   Oriented to person, place, and time.   Attention: normal. Concentration: normal.   Speech: speech is normal   Level of consciousness: alert    Bright and awake.  Oriented x3.  Follows commands without prompting showing thumbs up and 2 fingers bilaterally.  Speech is clear.     Cranial Nerves     CN II   Visual fields full to confrontation.     CN III, IV, VI   Pupils are equal, round, and reactive to light.  Extraocular motions are normal.     CN V   Facial sensation intact.     CN VII   Facial expression full, symmetric.     CN VIII   CN VIII normal.     CN IX, X   CN IX normal.     CN XI   CN XI normal.     Motor Exam   Right arm tone: normal  Left arm tone: normal  Right arm pronator drift: absent  Left arm pronator drift: absent  Right leg tone: normal  Left leg tone: normal    Strength   Right deltoid: 5/5  Left deltoid: 5/5  Right biceps: 5/5  Left biceps: 5/5  Right triceps: 5/5  Left triceps: 5/5  Right wrist extension: 5/5  Left wrist extension: 5/5  Right iliopsoas: 5/5  Left iliopsoas: 5/5  Right quadriceps: 5/5  Left quadriceps: 5/5  Right anterior tibial: 5/5  Left anterior tibial: 5/5  Right gastroc: 5/5  Left gastroc: 5/5  Moves all extremities equal and symmetric  Right EHL 5/5  Left EHL 5/5       Sensory Exam   Right arm light touch: normal  Left arm light touch: normal  Right leg light touch: normal  Left leg light touch: normal    Gait, Coordination, and Reflexes     Gait  Gait: normal    Tremor   Resting tremor: absent  Intention tremor: absent  Action  tremor: absent    Reflexes   Right brachioradialis: 1+  Left brachioradialis: 1+  Right biceps: 1+  Left biceps: 1+  Right triceps: 1+  Left triceps: 1+  Right patellar: 1+  Left patellar: 1+  Right achilles: 1+  Left achilles: 1+  Right plantar: upgoing  Left plantar: upgoing  Right Jj: absent  Left Jj: absent  Right ankle clonus: absent  Left ankle clonus: absent  Right pendular knee jerk: absent  Left pendular knee jerk: absent    Results Review:   Independent review and interpretation of imaging  Imaging Results (Last 24 Hours)     Procedure Component Value Units Date/Time    XR Chest 1 View [[id number]] Collected: 06/30/22 2157     Updated: 06/30/22 2205    Narrative:      EXAM/TECHNIQUE: XR CHEST 1 VW-     INDICATION: preop; T81.40XA-Infection following a procedure,  unspecified, initial encounter; Z98.890-Other specified postprocedural  states     COMPARISON: 06/23/2022     FINDINGS:     Cardiomediastinal silhouette is within normal limits. No pleural  effusion or pneumothorax. No focal consolidation. No acute osseous  findings.       Impression:         No acute findings.  This report was finalized on 06/30/2022 21:57 by Dr. Mk Chinchilla MD.        MRI brain:  MRI spine:       CT Head:  CT c-spine:  CT t-spine:  CT l-spine:  X-ray:    I reviewed the patient's new clinical results.  Lab Results (last 24 hours)     Procedure Component Value Units Date/Time    POC Glucose Once [[id number]]  (Normal) Collected: 07/01/22 0730    Specimen: Blood Updated: 07/01/22 0741     Glucose 101 mg/dL      Comment: : [id number] You Haro ID: [id number]       Comprehensive Metabolic Panel [[id number]]  (Abnormal) Collected: 07/01/22 0556    Specimen: Blood Updated: 07/01/22 0659     Glucose 140 mg/dL      BUN 8 mg/dL      Creatinine 0.77 mg/dL      Sodium 141 mmol/L      Potassium 3.9 mmol/L      Chloride 106 mmol/L      CO2 28.0 mmol/L      Calcium 8.8 mg/dL      Total Protein 6.2 g/dL      Albumin  2.90 g/dL      ALT (SGPT) 37 U/L      AST (SGOT) 19 U/L      Alkaline Phosphatase 109 U/L      Total Bilirubin 0.2 mg/dL      Globulin 3.3 gm/dL      A/G Ratio 0.9 g/dL      BUN/Creatinine Ratio 10.4     Anion Gap 7.0 mmol/L      eGFR 98.7 mL/min/1.73      Comment: National Kidney Foundation and American Society of Nephrology (ASN) Task Force recommended calculation based on the Chronic Kidney Disease Epidemiology Collaboration (CKD-EPI) equation refit without adjustment for race.       Narrative:      GFR Normal >60  Chronic Kidney Disease <60  Kidney Failure <15      CBC Auto Differential [[id number]]  (Abnormal) Collected: 07/01/22 0556    Specimen: Blood Updated: 07/01/22 0643     WBC 10.28 10*3/mm3      RBC 3.09 10*6/mm3      Hemoglobin 9.4 g/dL      Hematocrit 30.5 %      MCV 98.7 fL      MCH 30.4 pg      MCHC 30.8 g/dL      RDW 12.9 %      RDW-SD 45.9 fl      MPV 9.9 fL      Platelets 280 10*3/mm3      Neutrophil % 69.5 %      Lymphocyte % 23.7 %      Monocyte % 5.4 %      Eosinophil % 0.3 %      Basophil % 0.3 %      Immature Grans % 0.8 %      Neutrophils, Absolute 7.15 10*3/mm3      Lymphocytes, Absolute 2.44 10*3/mm3      Monocytes, Absolute 0.55 10*3/mm3      Eosinophils, Absolute 0.03 10*3/mm3      Basophils, Absolute 0.03 10*3/mm3      Immature Grans, Absolute 0.08 10*3/mm3      nRBC 0.0 /100 WBC     Lactic Acid, Plasma [[id number]]  (Normal) Collected: 06/30/22 2212    Specimen: Blood Updated: 06/30/22 2247     Lactate 1.3 mmol/L     Protime-INR [[id number]]  (Abnormal) Collected: 06/30/22 2212    Specimen: Blood Updated: 06/30/22 2246     Protime 14.3 Seconds      INR 1.16    aPTT [[id number]]  (Normal) Collected: 06/30/22 2212    Specimen: Blood Updated: 06/30/22 2246     PTT 29.1 seconds     Blood Culture - Blood, Arm, Left [[id number]] Collected: 06/30/22 2026    Specimen: Blood from Arm, Left Updated: 06/30/22 2223    Blood Culture - Blood, Arm, Left [[id number]] Collected: 06/30/22 2030    Specimen:  Blood from Arm, Left Updated: 06/30/22 2223    Urinalysis With Culture If Indicated - Urine, Clean Catch [[id number]]  (Abnormal) Collected: 06/30/22 1947    Specimen: Urine, Clean Catch Updated: 06/30/22 2149     Color, UA Yellow     Appearance, UA Cloudy     pH, UA 5.5     Specific Gravity, UA 1.025     Glucose,  mg/dL (Trace)     Ketones, UA Negative     Bilirubin, UA Negative     Blood, UA Negative     Protein, UA Trace     Leuk Esterase, UA Small (1+)     Nitrite, UA Negative     Urobilinogen, UA 1.0 E.U./dL    Narrative:      In absence of clinical symptoms, the presence of pyuria, bacteria, and/or nitrites on the urinalysis result does not correlate with infection.    Urinalysis, Microscopic Only - Urine, Clean Catch [[id number]]  (Abnormal) Collected: 06/30/22 1947    Specimen: Urine, Clean Catch Updated: 06/30/22 2149     RBC, UA 6-12 /HPF      WBC, UA 6-12 /HPF      Bacteria, UA Trace /HPF      Squamous Epithelial Cells, UA 3-6 /HPF      Hyaline Casts, UA None Seen /LPF      Methodology Manual Light Microscopy    Urine Culture - Urine, Urine, Clean Catch [[id number]] Collected: 06/30/22 1947    Specimen: Urine, Clean Catch Updated: 06/30/22 2149    [place] Urine - Urine, Clean Catch [[id number]] Collected: 06/30/22 1947    Specimen: Urine, Clean Catch Updated: 06/30/22 2101     Extra Tube Hold for add-ons.     Comment: Auto resulted.           ADITYA Cespedes          Electronically signed by Felipe Banerjee MD at 07/02/22 0659          Emergency Department Notes      Marcy Bryan APRN at 06/30/22 2002     Attestation signed by Adriel Monzon MD at 07/01/22 6156        SUPERVISE: For this patient encounter, I reviewed the APC's documentation, treatment plan, and medical decision making.  Adriel Monzon MD 7/1/2022 07:57 CDT                         Subjective   Patient is a 66-year-old male who presents the ER today with complaints of headache.  The patient had a craniotomy for a  tumor on June 16, 2022.  The patient had presented earlier today with complaint of a headache.  He was found to have a postoperative infection after being seen by neurosurgery.  The patient was recommended to be admitted to the hospital however he had some things that he needed to do at home so he left AGAINST MEDICAL ADVICE earlier.  He has now returned and would like to be admitted.  He presents today for further evaluation.      History provided by:  Patient   used: No        Review of Systems   Neurological: Positive for headaches.   All other systems reviewed and are negative.      Past Medical History:   Diagnosis Date   • Brain tumor (HCC)    • Coronary artery disease    • COVID-19 vaccine series completed     MODERNA X 3; LAST DOSE 3/2022   • Diabetes (HCC)    • Erectile dysfunction    • GERD (gastroesophageal reflux disease)    • Hypercholesteremia    • Hypertension        No Known Allergies    Past Surgical History:   Procedure Laterality Date   • BALLOON ANGIOPLASTY, ARTERY Right    • COLONOSCOPY     • CRANIOTOMY FOR TUMOR Right 6/16/2022    Procedure: CRANIOTOMY FOR TUMOR STERIOTACTIC WITH BRAIN LAB, right;  Surgeon: Flaco Portillo MD;  Location: [facility];  Service: Neurosurgery;  Laterality: Right;       Family History   Problem Relation Age of Onset   • Cancer Mother         liver   • Heart disease Father        Social History     Socioeconomic History   • Marital status:    Tobacco Use   • Smoking status: Current Every Day Smoker     Packs/day: 0.50   • Smokeless tobacco: Never Used   Vaping Use   • Vaping Use: Never used   Substance and Sexual Activity   • Alcohol use: Never   • Drug use: Never   • Sexual activity: Defer           Objective   Physical Exam  Vitals and nursing note reviewed.   Constitutional:       Appearance: Normal appearance.   HENT:      Head: Normocephalic.      Right Ear: External ear normal.      Left Ear: External ear normal.       Mouth/Throat:      Mouth: Mucous membranes are moist.      Pharynx: Oropharynx is clear.   Eyes:      Conjunctiva/sclera: Conjunctivae normal.      Pupils: Pupils are equal, round, and reactive to light.   Cardiovascular:      Rate and Rhythm: Normal rate and regular rhythm.   Pulmonary:      Effort: Pulmonary effort is normal.      Breath sounds: Normal breath sounds.   Abdominal:      General: Bowel sounds are normal.      Palpations: Abdomen is soft.   Skin:     General: Skin is warm and dry.      Capillary Refill: Capillary refill takes less than 2 seconds.   Neurological:      General: No focal deficit present.      Mental Status: He is alert and oriented to person, place, and time.   Psychiatric:         Mood and Affect: Mood normal.         Behavior: Behavior normal.         Procedures        No orders to display     Labs Reviewed   BLOOD CULTURE   BLOOD CULTURE   RAINBOW URINE       ED Course  ED Course as of 06/30/22 2032   Thu Jun 30, 2022 2005 I have discussed pt case with Dr. Schwartz and Rahul Arnold NP. Pt will be admitted at this time to Dr. Schwartz in stable cond.  [LF]      ED Course User Index  [LF] Marcy Bryan, APRN                                 No orders to display     Labs Reviewed   BLOOD CULTURE   BLOOD CULTURE   RAINBOW URINE               MDM  Number of Diagnoses or Management Options  Postoperative infection, unspecified type, initial encounter: established and worsening     Amount and/or Complexity of Data Reviewed  Clinical lab tests: ordered  Discuss the patient with other providers: yes    Patient Progress  Patient progress: stable      Final diagnoses:   Postoperative infection, unspecified type, initial encounter       ED Disposition  ED Disposition     ED Disposition   Decision to Admit    Condition   --    Comment   Level of Care: Med/Surg [1]   Diagnosis: Post-operative infection [090036]   Admitting Physician: MEETA SCHWARTZ [[id number]]   Attending Physician: MEETA SCHWARTZ  [[id number]]   Isolate for COVID?: No [0]   Certification: I Certify That Inpatient Hospital Services Are Medically Necessary For Greater Than 2 Midnights               No follow-up provider specified.       Medication List      No changes were made to your prescriptions during this visit.          Marcy Bryan, APRN  06/30/22 2021       Marcy Bryan, APRN  06/30/22 2032      Electronically signed by Adriel Monzon MD at 07/01/22 0757     Lima Almendarez, RN at 06/30/22 2035        Pt given something to eat and drink. Informed him of rm assignment and that this nurse will check on pain meds for him.     Electronically signed by Lima Almendarez RN at 06/30/22 2048     Lima Almendarez RN at 06/30/22 2048        Attempted to call report. No answer.    Electronically signed by Lima Almendarez RN at 06/30/22 2048     Lima Almendarez, RN at 06/30/22 2107        Pt states it is ok to give any information to his niece, Guadalupe, should she call. His wife is deaf and unable to get an update any other way.     Electronically signed by Lima Almendarez RN at 06/30/22 2108

## 2022-07-04 NOTE — PLAN OF CARE
"  Problem: Adult Inpatient Plan of Care  Goal: Plan of Care Review  Outcome: Ongoing, Progressing  Flowsheets (Taken 7/4/2022 2191)  Progress: no change  Plan of Care Reviewed With: patient  Outcome Evaluation: A&Ox3-4. Pt confused at times. Pt asked \"who's that standing in the corner?\" at the beginning of shift. Thought he was at his house upon waking around midnight. A-fib on telemetry. Head dressing CDI. C/o severe head pressure, prn pain meds given with good results. VSS. Voiding - incont at times, urgency noted. Spoke with pt's sister. Call light in reach. Safety maintained.      "

## 2022-07-04 NOTE — PAYOR COMM NOTE
"7/3 CLINICAL UPDATE  [id number]   [phone]    Virgil Escobar (66 y.o. Male)             Date of Birth   1955    Social Security Number       Address   [address] [place], [place] [address]    Home Phone   [phone]    MRN   [id number]       Worship   Non-Pentecostal    Marital Status                               Admission Date   6/30/22    Admission Type   Urgent    Admitting Provider   Felipe Banerjee MD    Attending Provider   Felipe Banerjee MD    Department, Room/Bed   [facility] 3A, 330/1       Discharge Date       Discharge Disposition       Discharge Destination                               Attending Provider: Felipe Banerjee MD    Allergies: No Known Allergies    Isolation: None   Infection: COVID (rule out) (06/30/22)   Code Status: CPR   Advance Care Planning Activity    Ht: 177.8 cm (70\")   Wt: 123 kg (271 lb 9.6 oz)    Admission Cmt: None   Principal Problem: None                Active Insurance as of 6/30/2022     Primary Coverage     Payor Plan Insurance Group Employer/Plan Group    ANTHEM MEDICARE REPLACEMENT ANTHEM MEDICARE ADVANTAGE [id number]     Payor Plan Address Payor Plan Phone Number Payor Plan Fax Number Effective Dates    [address] [phone]  1/1/2022 - None Entered    [facility] [id number]       Subscriber Name Subscriber Birth Date Member ID       VIRGIL ESCOBAR 1955 [id number]                 Emergency Contacts      (Rel.) Home Phone Work Phone Mobile Phone    MAYTE ESCOBAR (Spouse) [phone] -- --    david lua (Sister) [phone] -- --    Manju Lua (Relative) -- -- [phone]              Current Facility-Administered Medications   Medication Dose Route Frequency Provider Last Rate Last Admin   • acetaminophen (TYLENOL) tablet 650 mg  650 mg Oral Q4H PRN Felipe Banerjee MD   650 mg at 07/01/22 1555    Or   • acetaminophen (TYLENOL) 160 MG/5ML solution 650 mg  650 mg Oral Q4H PRN Felipe Banerjee, " MD        Or   • acetaminophen (TYLENOL) suppository 650 mg  650 mg Rectal Q4H PRN Felipe Banerjee MD       • atorvastatin (LIPITOR) tablet 20 mg  20 mg Oral Nightly Felipe Banerjee MD   20 mg at 07/03/22 2130   • sennosides-docusate (PERICOLACE) 8.6-50 MG per tablet 2 tablet  2 tablet Oral BID Felipe Banerjee MD   2 tablet at 07/03/22 2054    And   • polyethylene glycol (MIRALAX) packet 17 g  17 g Oral Daily PRN Felipe Banerjee MD        And   • bisacodyl (DULCOLAX) EC tablet 5 mg  5 mg Oral Daily PRN Felipe Banerjee MD        And   • bisacodyl (DULCOLAX) suppository 10 mg  10 mg Rectal Daily PRN Felipe Banerjee MD       • cefepime (MAXIPIME) 2 g/100 mL 0.9% NS (mbp)  2 g Intravenous Q8H Felipe Banerjee MD   2 g at 07/04/22 0541   • dextrose (D50W) (25 g/50 mL) IV injection 25 g  25 g Intravenous Q15 Min PRN Felipe Banerjee MD       • dextrose (GLUTOSE) oral gel 15 g  15 g Oral Q15 Min PRN Felipe Banerjee MD       • dilTIAZem CD (CARDIZEM CD) 24 hr capsule 240 mg  240 mg Oral Q24H Felipe Banerjee MD   240 mg at 07/03/22 0814   • famotidine (PEPCID) tablet 40 mg  40 mg Oral Daily Felipe Banerjee MD   40 mg at 07/03/22 0814   • gabapentin (NEURONTIN) capsule 300 mg  300 mg Oral TID Felipe Banerjee MD   300 mg at 07/03/22 2054   • glucagon (human recombinant) (GLUCAGEN DIAGNOSTIC) injection 1 mg  1 mg Intramuscular Q15 Min PRN Felipe Banerjee MD       • insulin detemir (LEVEMIR) injection 20 Units  20 Units Subcutaneous Nightly iNma Bowman DO   20 Units at 07/03/22 2113   • Insulin Lispro (humaLOG) injection 3-14 Units  3-14 Units Subcutaneous TID AC Felipe Banerjee MD   10 Units at 07/03/22 1707   • levETIRAcetam (KEPPRA) tablet 500 mg  500 mg Oral BID Felipe Banerjee MD   500 mg at 07/03/22 2130   • lisinopril (PRINIVIL,ZESTRIL) tablet 40 mg  40 mg Oral Daily Felipe Banerjee MD   40 mg at 07/03/22 0813   • ondansetron (ZOFRAN) tablet 4 mg  4 mg Oral Q6H PRN Felipe Banerjee MD   "      Or   • ondansetron (ZOFRAN) injection 4 mg  4 mg Intravenous Q6H PRN Felipe Banerjee MD       • oxyCODONE-acetaminophen (PERCOCET)  MG per tablet 1 tablet  1 tablet Oral Q4H PRN Felipe Banerjee MD   1 tablet at 07/04/22 0345   • oxyCODONE-acetaminophen (PERCOCET) 7.5-325 MG per tablet 1 tablet  1 tablet Oral Q4H PRN Felipe Banerjee MD   1 tablet at 07/03/22 1505   • sodium chloride 0.9 % flush 10 mL  10 mL Intravenous Q12H Felipe Banerjee MD   10 mL at 07/03/22 2054   • sodium chloride 0.9 % flush 10 mL  10 mL Intravenous PRN Felipe Banerjee MD       • sotalol (BETAPACE) tablet 120 mg  120 mg Oral BID Felipe Banerjee MD   120 mg at 07/03/22 2130     Orders (last 24 hrs)      Start     Ordered    07/04/22 0900  Vancomycin, Trough  Timed         07/03/22 0807    07/04/22 0821  POC Glucose Once  PROCEDURE ONCE         07/04/22 0800    07/03/22 2109  POC Glucose Once  PROCEDURE ONCE         07/03/22 2057    07/03/22 2100  insulin detemir (LEVEMIR) injection 20 Units  Nightly         07/03/22 1134    07/03/22 1644  POC Glucose Once  PROCEDURE ONCE         07/03/22 1632    07/03/22 1106  Transfer Patient  Once         07/03/22 1106    07/02/22 2100  insulin detemir (LEVEMIR) injection 15 Units  Nightly,   Status:  Discontinued         07/02/22 1023    07/02/22 0215  dilTIAZem (CARDIZEM) 125 mg in 125 mL 0.7% sodium chloride  infusion  Titrated,   Status:  Discontinued         07/02/22 0124    07/01/22 2145  niCARdipine (CARDENE) 25 mg in 250 mL NS infusion  Titrated,   Status:  Discontinued         07/01/22 2045    07/01/22 1648  lactated ringers infusion  Continuous,   Status:  Discontinued         07/01/22 1646    07/01/22 1000  vancomycin 1250 mg/250 mL 0.9% NS IVPB ([facility])  Every 12 Hours,   Status:  Discontinued        \"Followed by\" Linked Group Details    06/30/22 2134 07/01/22 0900  dilTIAZem CD (CARDIZEM CD) 24 hr capsule 240 mg  Every 24 Hours Scheduled         06/30/22 2128 07/01/22 " "0900  lisinopril (PRINIVIL,ZESTRIL) tablet 40 mg  Daily         06/30/22 2128 07/01/22 0900  famotidine (PEPCID) tablet 40 mg  Daily         06/30/22 2128 07/01/22 0800  Oral Care  2 Times Daily       06/30/22 2128 07/01/22 0800  Chlorhexidine Shower / Bath BID Day Before Surgery  2 Times Daily      Comments: (If Not Completed Prior) Chlorhexidine Skin Prep and Instructions For All Patients Having A Procedure Requiring an Outward Incision if Not Allergic.  If Allergic, Give Antibacterial Skin Wipes and Instructions.  Do Not Use For Facial Cases or on Any Mucus Membranes.    06/30/22 2051 07/01/22 0730  Insulin Lispro (humaLOG) injection 3-14 Units  3 Times Daily Before Meals         06/30/22 2128 07/01/22 0700  POC Glucose TID AC  3 Times Daily Before Meals       06/30/22 2128 07/01/22 0600  cefepime (MAXIPIME) 2 g/100 mL 0.9% NS (mbp)  Every 8 Hours         06/30/22 2128 07/01/22 0000  Vital Signs  Every 4 Hours       06/30/22 2128 07/01/22 0000  Neuro Checks  Every 4 Hours       06/30/22 2128 06/30/22 2215  atorvastatin (LIPITOR) tablet 20 mg  Nightly         06/30/22 2128 06/30/22 2215  gabapentin (NEURONTIN) capsule 300 mg  3 Times Daily         06/30/22 2128 06/30/22 2215  levETIRAcetam (KEPPRA) tablet 500 mg  2 Times Daily         06/30/22 2128 06/30/22 2215  sotalol (BETAPACE) tablet 120 mg  2 Times Daily         06/30/22 2128 06/30/22 2215  sodium chloride 0.9 % flush 10 mL  Every 12 Hours Scheduled         06/30/22 2128 06/30/22 2215  sennosides-docusate (PERICOLACE) 8.6-50 MG per tablet 2 tablet  2 Times Daily        \"And\" Linked Group Details    06/30/22 2128 06/30/22 2200  Incentive Spirometry  Every 4 Hours While Awake       06/30/22 2128 06/30/22 2129  Ambulate Patient  Every Shift       06/30/22 2128 06/30/22 2129  Intake & Output  Every Shift       06/30/22 2128 06/30/22 2128  acetaminophen (TYLENOL) tablet 650 mg  Every 4 Hours PRN        \"Or\" " "Linked Group Details    06/30/22 2128 06/30/22 2128  acetaminophen (TYLENOL) 160 MG/5ML solution 650 mg  Every 4 Hours PRN        \"Or\" Linked Group Details    06/30/22 2128 06/30/22 2128  acetaminophen (TYLENOL) suppository 650 mg  Every 4 Hours PRN        \"Or\" Linked Group Details    06/30/22 2128 06/30/22 2128  ondansetron (ZOFRAN) tablet 4 mg  Every 6 Hours PRN        \"Or\" Linked Group Details    06/30/22 2128 06/30/22 2128  ondansetron (ZOFRAN) injection 4 mg  Every 6 Hours PRN        \"Or\" Linked Group Details    06/30/22 2128 06/30/22 2128  polyethylene glycol (MIRALAX) packet 17 g  Daily PRN        \"And\" Linked Group Details    06/30/22 2128 06/30/22 2128  bisacodyl (DULCOLAX) EC tablet 5 mg  Daily PRN        \"And\" Linked Group Details    06/30/22 2128 06/30/22 2128  bisacodyl (DULCOLAX) suppository 10 mg  Daily PRN        \"And\" Linked Group Details    06/30/22 2128 06/30/22 2128  dextrose (GLUTOSE) oral gel 15 g  Every 15 Minutes PRN         06/30/22 2128 06/30/22 2128  dextrose (D50W) (25 g/50 mL) IV injection 25 g  Every 15 Minutes PRN         06/30/22 2128 06/30/22 2128  glucagon (human recombinant) (GLUCAGEN DIAGNOSTIC) injection 1 mg  Every 15 Minutes PRN         06/30/22 2128 06/30/22 2128  labetalol (NORMODYNE,TRANDATE) injection 10 mg  Every 6 Hours PRN,   Status:  Discontinued         06/30/22 2128 06/30/22 2128  sodium chloride 0.9 % flush 10 mL  As Needed         06/30/22 2128 06/30/22 2128  oxyCODONE-acetaminophen (PERCOCET) 7.5-325 MG per tablet 1 tablet  Every 4 Hours PRN         06/30/22 2128    06/30/22 2128  oxyCODONE-acetaminophen (PERCOCET)  MG per tablet 1 tablet  Every 4 Hours PRN         06/30/22 2128    Unscheduled  Up in Chair  As Needed       06/30/22 2128    Unscheduled  Up With Assistance  As Needed       06/30/22 2128    --  SCANNED - TELEMETRY           06/30/22 0000    --  ibuprofen (ADVIL,MOTRIN) 200 MG tablet  2 Times Daily PRN      "    07/01/22 1056    --  insulin aspart (novoLOG FLEXPEN) 100 UNIT/ML solution pen-injector sc pen  3 Times Daily With Meals         07/01/22 1056    --  Insulin Glargine (Lantus SoloStar) 100 UNIT/ML injection pen  Nightly         07/01/22 1153    --  HYDROcodone-acetaminophen (NORCO) 7.5-325 MG per tablet  Every 6 Hours PRN         07/01/22 1157    --  butalbital-acetaminophen-caffeine (FIORICET, ESGIC) -40 MG per tablet  Every 6 Hours PRN         07/01/22 1257    --  SCANNED - TELEMETRY           06/30/22 0000                   Operative/Procedure Notes (all)      Felipe Banerjee MD at 07/01/22 1830  Version 1 of 1         And Procedure Note  Preop Diagnosis: Postoperative infection, unspecified type, initial encounter [T81.40XA]  S/P craniotomy [Z98.890]    Post-Op Diagnosis Codes:     * Postoperative infection, unspecified type, initial encounter [T81.40XA]     * S/P craniotomy [Z98.890]     Procedure Name: Cranial wound vision and drainage and washout  Craniectomy    Indications:  A imaging and clinical exam revealed findings of infected cranial wound. The patient now presents for incision and drainage of wound and removal of bone flap after discussing therapeutic alternatives.          Surgeon: Felipe Banerjee MD     Assistants: None    Anesthesia: General endotracheal anesthesia    ASA Class: 3    Procedure Details   After obtaining informed consent, having the risks and benefits of the procedure explained including but not limited to infection, bleeding, paralysis, spinal fluid leak, bowel bladder incontinence, stroke, coma, and death.  Patient was brought to the operating.  He was given general anesthesia via an endotracheal tube.  He was laid supine on operating table.  His head was placed on a foam donut and turned to the left.  The previous incision was readily identified.  His hair was removed using electric clippers.  The head was then prepped and draped in standard sterile fashion.  10 blade  scalpel was used to make an incision over the previous wound.  There was an immediate rush of purulent material in the subgaleal space.  The remainder the incision was opened using a combination of sharp dissection and Bovie cautery.  The skin flap was pulled forward.  The subgaleal space and epidural space was grossly contaminated with purulent material.  The bone flap was then removed using a screwdriver.  Culture samples were obtained the entire wound was then copiously irrigated with 2 L of vancomycin solution.  The wound was then inspected for hemostasis.  The galea was reapproximated using a series of inverted erupted 0 Vicryl sutures.  And the skin was closed in a running 4-0 Monocryl.  All sponge needle and instrument counts were correct at the end the procedure.  The patient was extubated in stable condition returned to recovery with about 50 cc of blood loss.    Findings:  Grossly contaminated cranial wound.  Purulent material in the subgaleal space and epidural space.]    Estimated Blood Loss:  50           Drains: None           Total IV Fluids: ml           Specimens:            Implants:   Implant Name Type Inv. Item Serial No.  Lot No. LRB No. Used Action   HEMOST ABS SURGIFOAM  8X12 10MM - [id number] Implant HEMOST ABS SURGIFOAM  8X12 10MM  [employer]  [employer] J [id number] Right 1 Implanted   KT HEMOST ABS SURGIFOAM PORCN 1GRAM - [id number] Implant KT HEMOST ABS SURGIFOAM PORCN 1GRAM  [employer]  [employer] J [id number] Right 1 Implanted              Complications: None           Disposition: PACU - hemodynamically stable.           Condition: stable        Felipe Banerjee MD    Electronically signed by Felipe Banerjee MD at 07/01/22 1833          Physician Progress Notes (last 24 hours)      Edwin Santos MD at 07/03/22 1901          Infectious Diseases Progress Note    Patient:  Elijah Escobar  YOB: 1955  MRN: [id number]   Admit date: 6/30/2022   Admitting  "Physician: Felipe Banerjee MD  Primary Care Physician: Dylan Lama APRN    Chief Complaint/Interval History: He seems to be feeling better.  He was answering questions appropriately.  He has been transferred out of ICU.  He had a little bit of confusion when he first got to the floor.  That seems to have cleared.  He has some mild headache currently.  Expected level of postoperative headache.  Seems to be doing better than when I saw him yesterday morning.  He feels overall better than at presentation.  No new neurological symptoms.  No fevers, chills, sweats, or rash.  No skin itching.    Intake/Output Summary (Last 24 hours) at 7/3/2022 1901  Last data filed at 7/3/2022 1853  Gross per 24 hour   Intake 2267 ml   Output 600 ml   Net 1667 ml     Allergies: No Known Allergies  Current Scheduled Medications:   atorvastatin, 20 mg, Oral, Nightly  cefepime, 2 g, Intravenous, Q8H  dilTIAZem CD, 240 mg, Oral, Q24H  famotidine, 40 mg, Oral, Daily  gabapentin, 300 mg, Oral, TID  insulin detemir, 20 Units, Subcutaneous, Nightly  insulin lispro, 3-14 Units, Subcutaneous, TID AC  levETIRAcetam, 500 mg, Oral, BID  lisinopril, 40 mg, Oral, Daily  senna-docusate sodium, 2 tablet, Oral, BID  sodium chloride, 10 mL, Intravenous, Q12H  sotalol, 120 mg, Oral, BID  vancomycin, 1,250 mg, Intravenous, Q12H      Current PRN Medications:  •  acetaminophen **OR** acetaminophen **OR** acetaminophen  •  senna-docusate sodium **AND** polyethylene glycol **AND** bisacodyl **AND** bisacodyl  •  dextrose  •  dextrose  •  glucagon (human recombinant)  •  ondansetron **OR** ondansetron  •  oxyCODONE-acetaminophen  •  oxyCODONE-acetaminophen  •  sodium chloride    Review of Systems see HPI    Vital Signs:  /95 (BP Location: Right arm, Patient Position: Lying)   Pulse 86   Temp 98.4 °F (36.9 °C) (Oral)   Resp 18   Ht 177.8 cm (70\")   Wt 123 kg (271 lb 9.6 oz)   SpO2 92%   BMI 38.97 kg/m²     Physical Exam  Vital signs - " reviewed.  Line/IV site - No erythema, warmth, induration, or tenderness.  Lungs without crackles or wheezes  Heart without murmur  Abdomen soft and nontender  Neurological examination seem to be nonfocal    Lab Results:  CBC:   Results from last 7 days   Lab Units 07/03/22 0324 07/02/22  0546 07/01/22  0556 06/30/22  1225   WBC 10*3/mm3 10.67 13.63* 10.28 14.28*   HEMOGLOBIN g/dL 10.5* 10.8* 9.4* 10.3*   HEMATOCRIT % 32.5* 33.0* 30.5* 31.2*   PLATELETS 10*3/mm3 266 314 280 282     BMP:  Results from last 7 days   Lab Units 07/03/22 0324 07/02/22  0546 07/01/22  0556 06/30/22  1225   SODIUM mmol/L 137 135* 141 139   POTASSIUM mmol/L 4.3 4.4 3.9 4.5   CHLORIDE mmol/L 102 99 106 105   CO2 mmol/L 28.0 22.0 28.0 25.0   BUN mg/dL 12 8 8 8   CREATININE mg/dL 0.86 0.71* 0.77 0.68*   GLUCOSE mg/dL 182* 228* 140* 202*   CALCIUM mg/dL 8.1* 8.4* 8.8 8.8   ALT (SGPT) U/L  --   --  37 45*     Culture Results:   Blood Culture   Date Value Ref Range Status   06/30/2022 No growth at 2 days  Preliminary   06/30/2022 No growth at 2 days  Preliminary     Urine Culture   Date Value Ref Range Status   06/30/2022 <10,000 CFU/mL Gram Positive Cocci (A)  Final     Comment:     Call if further workup needed.       Wound Culture   Date Value Ref Range Status   07/01/2022 Scant growth (1+) Serratia marcescens (A)  Final     Susceptibility     Serratia marcescens     STALIN     Cefepime <=1 ug/ml Susceptible     Ceftazidime <=1 ug/ml Susceptible     Ceftriaxone <=1 ug/ml Susceptible     Gentamicin <=1 ug/ml Susceptible     Levofloxacin <=0.12 ug/ml Susceptible     Piperacillin + Tazobactam <=4 ug/ml Susceptible     Trimethoprim + Sulfamethoxazole <=20 ug/ml Susceptible      Tissue Culture   Lab   Scant growth (1+) Serratia marcescens Abnormal   [facility]               Gram Stain   Lab   Moderate (3+) WBCs seen  [facility]      Moderate (3+) Red blood cells  [facility]      No organisms seen  [facility]                Susceptibility     Serratia  marcescens     STALIN     Cefepime <=1 ug/ml Susceptible     Ceftazidime <=1 ug/ml Susceptible     Ceftriaxone <=1 ug/ml Susceptible     Gentamicin <=1 ug/ml Susceptible     Levofloxacin <=0.12 ug/ml Susceptible     Piperacillin + Tazobactam <=4 ug/ml Susceptible     Tetracycline >=16 ug/ml Resistant     Trimethoprim + Sulfamethoxazole <=20 ug/ml Susceptible               Radiology: None  Additional Studies Reviewed: None    Impression:   1.  Recent craniotomy for meningioma  2.  Subgaleal/epidural infection-Serratia marcescens recovered from both preoperative and intraoperative culture.  3.  Impetigo upper nose/medial eye area-seems to be drying/improving  4.  Diabetes mellitus  5.  Coronary artery disease    Recommendations:   Continue antibiotic treatment with cefepime  Discontinue vancomycin  Continue supportive care  Continue to follow    Edwin Jeffers MD    Electronically signed by Edwin Jeffers MD at 07/03/22 1931     Felipe Banerjee MD at 07/03/22 1106          Elijah Escobar  66 y.o.      Chief complaint:   Headache    Subjective  No events.  Headaches are controlled.  Tolerating all p.o.  Working physical therapy.    Temp:  [97.8 °F (36.6 °C)-98.4 °F (36.9 °C)] 98.1 °F (36.7 °C)  Heart Rate:  [] 103  Resp:  [16-18] 18  BP: (107-168)/() 160/138  Arterial Line BP: ()/() 147/74      Objective    Neurologic Exam     Mental Status   Oriented to person, place, and time.   Attention: normal.   Speech: speech is normal   Level of consciousness: alert  Knowledge: good.     Cranial Nerves     CN II   Visual fields full to confrontation.     CN III, IV, VI   Pupils are equal, round, and reactive to light.  Extraocular motions are normal.     CN V   Facial sensation intact.     CN VII   Facial expression full, symmetric.     CN VIII   CN VIII normal.     CN IX, X   CN IX normal.   CN X normal.     CN XI   CN XI normal.     CN XII   CN XII normal.     Motor Exam   Muscle bulk:  normal  Overall muscle tone: normal  Right arm pronator drift: absent  Left arm pronator drift: absent    Strength   Strength 5/5 throughout.     Sensory Exam   Light touch normal.   Pinprick normal.     Gait, Coordination, and Reflexes     Gait  Gait: normal    Coordination   Finger to nose coordination: normal    Tremor   Resting tremor: absent  Intention tremor: absent  Action tremor: absent    Reflexes   Reflexes 2+ except as noted.       Lab Results (last 24 hours)     Procedure Component Value Units Date/Time    Wound Culture - Wound, Cranium [[id number]]  (Abnormal)  (Susceptibility) Collected: 07/01/22 1747    Specimen: Wound from Cranium Updated: 07/03/22 0953     Wound Culture Scant growth (1+) Serratia marcescens     Gram Stain Many (4+) WBCs seen      No organisms seen    Susceptibility      Serratia marcescens      STALIN      Cefepime Susceptible      Ceftazidime Susceptible      Ceftriaxone Susceptible      Gentamicin Susceptible      Levofloxacin Susceptible      Piperacillin + Tazobactam Susceptible      Tetracycline Resistant     Trimethoprim + Sulfamethoxazole Susceptible                    Linear View               Susceptibility Comments     Serratia marcescens    Cefazolin sensitivity will not be reported for Enterobacteriaceae in non-urine isolates. If cefazolin is preferred, please call the microbiology lab to request an E-test.  With the exception of urinary-sourced infections, aminoglycosides should not be used as monotherapy.             Tissue / Bone Culture - Tissue, Cranium [[id number]]  (Abnormal)  (Susceptibility) Collected: 07/01/22 1759    Specimen: Tissue from Cranium Updated: 07/03/22 0947     Tissue Culture Scant growth (1+) Serratia marcescens     Gram Stain Moderate (3+) WBCs seen      Moderate (3+) Red blood cells      No organisms seen    Susceptibility      Serratia marcescens      STALIN      Cefepime Susceptible      Ceftazidime Susceptible      Ceftriaxone Susceptible       Gentamicin Susceptible      Levofloxacin Susceptible      Piperacillin + Tazobactam Susceptible      Tetracycline Resistant     Trimethoprim + Sulfamethoxazole Susceptible                    Linear View               Susceptibility Comments     Serratia marcescens    Cefazolin sensitivity will not be reported for Enterobacteriaceae in non-urine isolates. If cefazolin is preferred, please call the microbiology lab to request an E-test.  With the exception of urinary-sourced infections, aminoglycosides should not be used as monotherapy.             POC Glucose Once [[id number]]  (Abnormal) Collected: 07/03/22 0723    Specimen: Blood Updated: 07/03/22 0734     Glucose 141 mg/dL      Comment: : [id number] Jayson AlmendarezMeter ID: [id number]       Basic Metabolic Panel [[id number]]  (Abnormal) Collected: 07/03/22 0324    Specimen: Blood Updated: 07/03/22 0441     Glucose 182 mg/dL      BUN 12 mg/dL      Creatinine 0.86 mg/dL      Sodium 137 mmol/L      Potassium 4.3 mmol/L      Chloride 102 mmol/L      CO2 28.0 mmol/L      Calcium 8.1 mg/dL      BUN/Creatinine Ratio 14.0     Anion Gap 7.0 mmol/L      eGFR 95.5 mL/min/1.73      Comment: National Kidney Foundation and American Society of Nephrology (ASN) Task Force recommended calculation based on the Chronic Kidney Disease Epidemiology Collaboration (CKD-EPI) equation refit without adjustment for race.       Narrative:      GFR Normal >60  Chronic Kidney Disease <60  Kidney Failure <15      Magnesium [[id number]]  (Normal) Collected: 07/03/22 0324    Specimen: Blood Updated: 07/03/22 0420     Magnesium 2.2 mg/dL     CBC & Differential [[id number]]  (Abnormal) Collected: 07/03/22 0324    Specimen: Blood Updated: 07/03/22 0401    Narrative:      The following orders were created for panel order CBC & Differential.  Procedure                               Abnormality         Status                     ---------                               -----------         ------                      CBC Auto Differential[[id number]]        Abnormal            Final result                 Please view results for these tests on the individual orders.    CBC Auto Differential [[id number]]  (Abnormal) Collected: 07/03/22 0324    Specimen: Blood Updated: 07/03/22 0401     WBC 10.67 10*3/mm3      RBC 3.33 10*6/mm3      Hemoglobin 10.5 g/dL      Hematocrit 32.5 %      MCV 97.6 fL      MCH 31.5 pg      MCHC 32.3 g/dL      RDW 12.9 %      RDW-SD 45.9 fl      MPV 9.4 fL      Platelets 266 10*3/mm3      Neutrophil % 71.0 %      Lymphocyte % 19.7 %      Monocyte % 8.2 %      Eosinophil % 0.2 %      Basophil % 0.2 %      Immature Grans % 0.7 %      Neutrophils, Absolute 7.59 10*3/mm3      Lymphocytes, Absolute 2.10 10*3/mm3      Monocytes, Absolute 0.87 10*3/mm3      Eosinophils, Absolute 0.02 10*3/mm3      Basophils, Absolute 0.02 10*3/mm3      Immature Grans, Absolute 0.07 10*3/mm3      nRBC 0.0 /100 WBC     Blood Culture - Blood, Arm, Left [[id number]]  (Normal) Collected: 06/30/22 2026    Specimen: Blood from Arm, Left Updated: 07/02/22 2230     Blood Culture No growth at 2 days    Blood Culture - Blood, Arm, Left [[id number]]  (Normal) Collected: 06/30/22 2030    Specimen: Blood from Arm, Left Updated: 07/02/22 2230     Blood Culture No growth at 2 days    POC Glucose Once [[id number]]  (Abnormal) Collected: 07/02/22 1631    Specimen: Blood Updated: 07/02/22 1642     Glucose 143 mg/dL      Comment: : [id number] Jaysondonavan PurvisoryMeter ID: [id number]       POC Glucose Once [[id number]]  (Abnormal) Collected: 07/02/22 1108    Specimen: Blood Updated: 07/02/22 1120     Glucose 175 mg/dL      Comment: : [id number] Iván OttealMeter ID: [id number]       Vancomycin, Trough [[id number]]  (Normal) Collected: 07/02/22 1039    Specimen: Blood Updated: 07/02/22 1111     Vancomycin Trough 17.50 mcg/mL               Plan:   Intracranial infection.  Cerebritis.  Culture results consistent with Serratia.  Currently on cefepime  and vancomycin per ID.  Okay to transfer to floor.      Hypertension    GERD (gastroesophageal reflux disease)    Coronary artery disease    Type 2 diabetes mellitus with hyperglycemia and peripheral neuropathy, with long-term current use of insulin (HCC)    Paroxysmal atrial fibrillation with rapid ventricular response (HCC)    Post-operative infection    S/P craniotomy        Felipe Banerjee MD    Electronically signed by Felipe Banerjee MD at 07/03/22 1108       Consult Notes (last 24 hours)  Notes from 07/03/22 0836 through 07/04/22 0836   No notes of this type exist for this encounter.

## 2022-07-04 NOTE — PROGRESS NOTES
Elijah Balbina Escobar  66 y.o.      Chief complaint:   headache    Subjective  No events. Still with some headache. tolerting PO    Temp:  [98 °F (36.7 °C)-99 °F (37.2 °C)] 98.5 °F (36.9 °C)  Heart Rate:  [] 109  Resp:  [18] 18  BP: (120-152)/(64-88) 152/78      Objective    Neurologic Exam     Mental Status   Oriented to person, place, and time.   Attention: normal.   Speech: speech is normal   Level of consciousness: alert  Knowledge: good.     Cranial Nerves     CN II   Visual fields full to confrontation.     CN III, IV, VI   Pupils are equal, round, and reactive to light.  Extraocular motions are normal.     CN V   Facial sensation intact.     CN VII   Facial expression full, symmetric.     CN VIII   CN VIII normal.     CN IX, X   CN IX normal.   CN X normal.     CN XI   CN XI normal.     CN XII   CN XII normal.     Motor Exam   Muscle bulk: normal  Overall muscle tone: normal  Right arm pronator drift: absent  Left arm pronator drift: absent    Strength   Strength 5/5 throughout.     Sensory Exam   Light touch normal.   Pinprick normal.     Gait, Coordination, and Reflexes     Gait  Gait: normal    Coordination   Finger to nose coordination: normal    Tremor   Resting tremor: absent  Intention tremor: absent  Action tremor: absent    Reflexes   Reflexes 2+ except as noted.       Lab Results (last 24 hours)     Procedure Component Value Units Date/Time    POC Glucose Once [[id number]]  (Abnormal) Collected: 07/04/22 1649    Specimen: Blood Updated: 07/04/22 1701     Glucose 157 mg/dL      Comment: : [id number] Mi LeonworthMeter ID: [id number]       POC Glucose Once [[id number]]  (Abnormal) Collected: 07/04/22 1201    Specimen: Blood Updated: 07/04/22 1212     Glucose 236 mg/dL      Comment: : [id number] Mi BI-SAM TechnologiesingsworthMeter ID: [id number]       Vancomycin, Trough [[id number]]  (Normal) Collected: 07/04/22 0826    Specimen: Blood Updated: 07/04/22 0858     Vancomycin Trough 8.30 mcg/mL     POC  Glucose Once [[id number]]  (Abnormal) Collected: 07/04/22 0800    Specimen: Blood Updated: 07/04/22 0820     Glucose 169 mg/dL      Comment: : [id number] Perryrosita LillyMeter ID: [id number]       Anaerobic Culture - Tissue, Cranium [[id number]]  (Normal) Collected: 07/01/22 1759    Specimen: Tissue from Cranium Updated: 07/04/22 0729     Anaerobic Culture No anaerobes isolated at 3 days    Blood Culture - Blood, Arm, Left [[id number]]  (Normal) Collected: 06/30/22 2026    Specimen: Blood from Arm, Left Updated: 07/03/22 2232     Blood Culture No growth at 3 days    Blood Culture - Blood, Arm, Left [[id number]]  (Normal) Collected: 06/30/22 2030    Specimen: Blood from Arm, Left Updated: 07/03/22 2232     Blood Culture No growth at 3 days    POC Glucose Once [[id number]]  (Abnormal) Collected: 07/03/22 2057    Specimen: Blood Updated: 07/03/22 2108     Glucose 175 mg/dL      Comment: : [id number] Curt EncarnacionianMeter ID: [id number]                 Plan:   Serratia cranial infection. On ABX per ID. Plan 3 weeks IV ABX. PT to see      Hypertension    GERD (gastroesophageal reflux disease)    Coronary artery disease    Type 2 diabetes mellitus with hyperglycemia and peripheral neuropathy, with long-term current use of insulin (HCC)    Paroxysmal atrial fibrillation with rapid ventricular response (HCC)    Post-operative infection    S/P craniotomy        Felipe Banerjee MD

## 2022-07-04 NOTE — PLAN OF CARE
Goal Outcome Evaluation:           Progress: no change  Outcome Evaluation: IV antibiotics continued per order. Pt became confused when waking up from a nap, but was reoriented with no difficulty. VSS, safety maintained.

## 2022-07-04 NOTE — PLAN OF CARE
Goal Outcome Evaluation:  Plan of Care Reviewed With: patient           Outcome Evaluation: Physical therapy initial evaluation performed. Pt fatigued quickly and had a overall decreased tolerance to activity. He was dizzy upon standing and slightly unsteady. He did seem confused at times and needed increased cues and time for directions. Skilled PT to work on these deficits. Likely will need further PT upon d/c. HH vs SNF placement depending on assist at home.

## 2022-07-05 ENCOUNTER — APPOINTMENT (OUTPATIENT)
Dept: CT IMAGING | Facility: HOSPITAL | Age: 67
End: 2022-07-05

## 2022-07-05 LAB
ALBUMIN SERPL-MCNC: 2.9 G/DL (ref 3.5–5.2)
ALBUMIN/GLOB SERPL: 0.7 G/DL
ALP SERPL-CCNC: 103 U/L (ref 39–117)
ALT SERPL W P-5'-P-CCNC: 18 U/L (ref 1–41)
ANION GAP SERPL CALCULATED.3IONS-SCNC: 13 MMOL/L (ref 5–15)
AST SERPL-CCNC: 12 U/L (ref 1–40)
BACTERIA SPEC AEROBE CULT: NORMAL
BACTERIA SPEC AEROBE CULT: NORMAL
BACTERIA UR QL AUTO: ABNORMAL /HPF
BILIRUB SERPL-MCNC: 0.5 MG/DL (ref 0–1.2)
BILIRUB UR QL STRIP: NEGATIVE
BUN SERPL-MCNC: 11 MG/DL (ref 8–23)
BUN/CREAT SERPL: 7.9 (ref 7–25)
CALCIUM SPEC-SCNC: 8.6 MG/DL (ref 8.6–10.5)
CHLORIDE SERPL-SCNC: 101 MMOL/L (ref 98–107)
CLARITY UR: CLEAR
CO2 SERPL-SCNC: 22 MMOL/L (ref 22–29)
COLOR UR: YELLOW
CREAT SERPL-MCNC: 1.39 MG/DL (ref 0.76–1.27)
CRP SERPL-MCNC: 5.93 MG/DL (ref 0–0.5)
DEPRECATED RDW RBC AUTO: 41.7 FL (ref 37–54)
EGFRCR SERPLBLD CKD-EPI 2021: 55.9 ML/MIN/1.73
ERYTHROCYTE [DISTWIDTH] IN BLOOD BY AUTOMATED COUNT: 12.4 % (ref 12.3–15.4)
ERYTHROCYTE [SEDIMENTATION RATE] IN BLOOD: 80 MM/HR (ref 0–20)
GLOBULIN UR ELPH-MCNC: 4 GM/DL
GLUCOSE BLDC GLUCOMTR-MCNC: 100 MG/DL (ref 70–130)
GLUCOSE BLDC GLUCOMTR-MCNC: 101 MG/DL (ref 70–130)
GLUCOSE BLDC GLUCOMTR-MCNC: 105 MG/DL (ref 70–130)
GLUCOSE BLDC GLUCOMTR-MCNC: 110 MG/DL (ref 70–130)
GLUCOSE BLDC GLUCOMTR-MCNC: 127 MG/DL (ref 70–130)
GLUCOSE BLDC GLUCOMTR-MCNC: 130 MG/DL (ref 70–130)
GLUCOSE BLDC GLUCOMTR-MCNC: 140 MG/DL (ref 70–130)
GLUCOSE BLDC GLUCOMTR-MCNC: 147 MG/DL (ref 70–130)
GLUCOSE BLDC GLUCOMTR-MCNC: 160 MG/DL (ref 70–130)
GLUCOSE BLDC GLUCOMTR-MCNC: 171 MG/DL (ref 70–130)
GLUCOSE BLDC GLUCOMTR-MCNC: 182 MG/DL (ref 70–130)
GLUCOSE BLDC GLUCOMTR-MCNC: 205 MG/DL (ref 70–130)
GLUCOSE SERPL-MCNC: 226 MG/DL (ref 65–99)
GLUCOSE UR STRIP-MCNC: ABNORMAL MG/DL
HCT VFR BLD AUTO: 31.7 % (ref 37.5–51)
HGB BLD-MCNC: 10.8 G/DL (ref 13–17.7)
HGB UR QL STRIP.AUTO: NEGATIVE
HYALINE CASTS UR QL AUTO: ABNORMAL /LPF
KETONES UR QL STRIP: ABNORMAL
LEUKOCYTE ESTERASE UR QL STRIP.AUTO: ABNORMAL
MCH RBC QN AUTO: 31.6 PG (ref 26.6–33)
MCHC RBC AUTO-ENTMCNC: 34.1 G/DL (ref 31.5–35.7)
MCV RBC AUTO: 92.7 FL (ref 79–97)
NITRITE UR QL STRIP: NEGATIVE
PH UR STRIP.AUTO: 7 [PH] (ref 5–8)
PLATELET # BLD AUTO: 278 10*3/MM3 (ref 140–450)
PMV BLD AUTO: 9.6 FL (ref 6–12)
POTASSIUM SERPL-SCNC: 4.8 MMOL/L (ref 3.5–5.2)
PROT SERPL-MCNC: 6.9 G/DL (ref 6–8.5)
PROT UR QL STRIP: ABNORMAL
RBC # BLD AUTO: 3.42 10*6/MM3 (ref 4.14–5.8)
RBC # UR STRIP: ABNORMAL /HPF
REF LAB TEST METHOD: ABNORMAL
SODIUM SERPL-SCNC: 136 MMOL/L (ref 136–145)
SP GR UR STRIP: 1.02 (ref 1–1.03)
SQUAMOUS #/AREA URNS HPF: ABNORMAL /HPF
UROBILINOGEN UR QL STRIP: ABNORMAL
WBC # UR STRIP: ABNORMAL /HPF
WBC NRBC COR # BLD: 11.26 10*3/MM3 (ref 3.4–10.8)

## 2022-07-05 PROCEDURE — 85027 COMPLETE CBC AUTOMATED: CPT | Performed by: NEUROLOGICAL SURGERY

## 2022-07-05 PROCEDURE — C1751 CATH, INF, PER/CENT/MIDLINE: HCPCS

## 2022-07-05 PROCEDURE — 85652 RBC SED RATE AUTOMATED: CPT | Performed by: NURSE PRACTITIONER

## 2022-07-05 PROCEDURE — 63710000001 INSULIN LISPRO (HUMAN) PER 5 UNITS: Performed by: NEUROLOGICAL SURGERY

## 2022-07-05 PROCEDURE — 86140 C-REACTIVE PROTEIN: CPT | Performed by: NURSE PRACTITIONER

## 2022-07-05 PROCEDURE — 02HV33Z INSERTION OF INFUSION DEVICE INTO SUPERIOR VENA CAVA, PERCUTANEOUS APPROACH: ICD-10-PCS | Performed by: NEUROLOGICAL SURGERY

## 2022-07-05 PROCEDURE — 80053 COMPREHEN METABOLIC PANEL: CPT | Performed by: NEUROLOGICAL SURGERY

## 2022-07-05 PROCEDURE — 82962 GLUCOSE BLOOD TEST: CPT

## 2022-07-05 PROCEDURE — 25010000002 CEFEPIME PER 500 MG: Performed by: NURSE PRACTITIONER

## 2022-07-05 PROCEDURE — 70450 CT HEAD/BRAIN W/O DYE: CPT

## 2022-07-05 PROCEDURE — 25010000002 CEFEPIME PER 500 MG: Performed by: NEUROLOGICAL SURGERY

## 2022-07-05 PROCEDURE — 97166 OT EVAL MOD COMPLEX 45 MIN: CPT

## 2022-07-05 PROCEDURE — 87086 URINE CULTURE/COLONY COUNT: CPT | Performed by: NURSE PRACTITIONER

## 2022-07-05 PROCEDURE — 0 INSULIN REGULAR HUMAN PER 5 UNITS: Performed by: NURSE PRACTITIONER

## 2022-07-05 PROCEDURE — 99024 POSTOP FOLLOW-UP VISIT: CPT | Performed by: NURSE PRACTITIONER

## 2022-07-05 PROCEDURE — 81001 URINALYSIS AUTO W/SCOPE: CPT | Performed by: NURSE PRACTITIONER

## 2022-07-05 PROCEDURE — 99024 POSTOP FOLLOW-UP VISIT: CPT | Performed by: NEUROLOGICAL SURGERY

## 2022-07-05 RX ORDER — NICOTINE POLACRILEX 4 MG
15 LOZENGE BUCCAL
Status: DISCONTINUED | OUTPATIENT
Start: 2022-07-05 | End: 2022-07-08

## 2022-07-05 RX ORDER — SODIUM CHLORIDE 0.9 % (FLUSH) 0.9 %
10 SYRINGE (ML) INJECTION AS NEEDED
Status: DISCONTINUED | OUTPATIENT
Start: 2022-07-05 | End: 2022-07-08

## 2022-07-05 RX ORDER — LIDOCAINE HYDROCHLORIDE 10 MG/ML
1 INJECTION, SOLUTION EPIDURAL; INFILTRATION; INTRACAUDAL; PERINEURAL ONCE
Status: COMPLETED | OUTPATIENT
Start: 2022-07-05 | End: 2022-07-05

## 2022-07-05 RX ORDER — DEXTROSE MONOHYDRATE 25 G/50ML
10-50 INJECTION, SOLUTION INTRAVENOUS
Status: DISCONTINUED | OUTPATIENT
Start: 2022-07-05 | End: 2022-07-08

## 2022-07-05 RX ORDER — SODIUM CHLORIDE 0.9 % (FLUSH) 0.9 %
10 SYRINGE (ML) INJECTION EVERY 12 HOURS SCHEDULED
Status: DISCONTINUED | OUTPATIENT
Start: 2022-07-05 | End: 2022-07-08

## 2022-07-05 RX ADMIN — GABAPENTIN 300 MG: 300 CAPSULE ORAL at 16:23

## 2022-07-05 RX ADMIN — CEFEPIME HYDROCHLORIDE 2 G: 2 INJECTION, POWDER, FOR SOLUTION INTRAVENOUS at 23:19

## 2022-07-05 RX ADMIN — Medication 10 ML: at 09:09

## 2022-07-05 RX ADMIN — DOCUSATE SODIUM 50 MG AND SENNOSIDES 8.6 MG 2 TABLET: 8.6; 5 TABLET, FILM COATED ORAL at 20:12

## 2022-07-05 RX ADMIN — LEVETIRACETAM 500 MG: 500 TABLET, FILM COATED ORAL at 20:12

## 2022-07-05 RX ADMIN — SODIUM CHLORIDE 2.2 UNITS/HR: 9 INJECTION, SOLUTION INTRAVENOUS at 13:15

## 2022-07-05 RX ADMIN — ATORVASTATIN CALCIUM 20 MG: 10 TABLET, FILM COATED ORAL at 20:12

## 2022-07-05 RX ADMIN — Medication 10 ML: at 13:16

## 2022-07-05 RX ADMIN — CEFEPIME HYDROCHLORIDE 2 G: 2 INJECTION, POWDER, FOR SOLUTION INTRAVENOUS at 06:35

## 2022-07-05 RX ADMIN — LEVETIRACETAM 500 MG: 500 TABLET, FILM COATED ORAL at 09:09

## 2022-07-05 RX ADMIN — DOCUSATE SODIUM 50 MG AND SENNOSIDES 8.6 MG 2 TABLET: 8.6; 5 TABLET, FILM COATED ORAL at 09:09

## 2022-07-05 RX ADMIN — OXYCODONE HYDROCHLORIDE AND ACETAMINOPHEN 1 TABLET: 7.5; 325 TABLET ORAL at 16:23

## 2022-07-05 RX ADMIN — FAMOTIDINE 40 MG: 20 TABLET, FILM COATED ORAL at 09:09

## 2022-07-05 RX ADMIN — GABAPENTIN 300 MG: 300 CAPSULE ORAL at 09:08

## 2022-07-05 RX ADMIN — GABAPENTIN 300 MG: 300 CAPSULE ORAL at 20:12

## 2022-07-05 RX ADMIN — LIDOCAINE HYDROCHLORIDE ANHYDROUS 1 ML: 10 INJECTION, SOLUTION INFILTRATION at 14:30

## 2022-07-05 RX ADMIN — OXYCODONE HYDROCHLORIDE AND ACETAMINOPHEN 1 TABLET: 7.5; 325 TABLET ORAL at 09:19

## 2022-07-05 RX ADMIN — SOTALOL HYDROCHLORIDE 120 MG: 80 TABLET ORAL at 09:09

## 2022-07-05 RX ADMIN — CEFEPIME HYDROCHLORIDE 2 G: 2 INJECTION, POWDER, FOR SOLUTION INTRAVENOUS at 16:02

## 2022-07-05 RX ADMIN — DILTIAZEM HYDROCHLORIDE 240 MG: 240 CAPSULE, EXTENDED RELEASE ORAL at 10:27

## 2022-07-05 RX ADMIN — LISINOPRIL 40 MG: 20 TABLET ORAL at 09:09

## 2022-07-05 RX ADMIN — INSULIN LISPRO 5 UNITS: 100 INJECTION, SOLUTION INTRAVENOUS; SUBCUTANEOUS at 09:09

## 2022-07-05 NOTE — PLAN OF CARE
Goal Outcome Evaluation:              Outcome Evaluation: pt transfer to ccu for closer neuro monitoring and tighter blood sugar control with insulin gtt. see mar. pt remains alert to self and situation dioriented to time and situation. he is confused. taylor's and follows commands. unable to void this pm bladder scan and discomfort i/o cath for ua. head dressing dry intact. vitals stable. scd's in place.

## 2022-07-05 NOTE — PROGRESS NOTES
Elijah Mortensen Escobar  66 y.o.      Chief complaint:   Headache    Subjective  Some confusion overnight.  Worked with physical therapy yesterday.  Tolerating all p.o.    Temp:  [98.2 °F (36.8 °C)-98.6 °F (37 °C)] 98.2 °F (36.8 °C)  Heart Rate:  [] 79  Resp:  [18] 18  BP: (127-152)/(72-89) 127/88      Objective    Neurologic Exam     Mental Status   Attention: normal.   Speech: speech is normal   Level of consciousness: alert  Knowledge: good.   Oriented to person this morning.  Confused.  Awake and alert however.     Cranial Nerves     CN II   Visual fields full to confrontation.     CN III, IV, VI   Pupils are equal, round, and reactive to light.  Extraocular motions are normal.     CN V   Facial sensation intact.     CN VII   Facial expression full, symmetric.     CN VIII   CN VIII normal.     CN IX, X   CN IX normal.   CN X normal.     CN XI   CN XI normal.     CN XII   CN XII normal.     Motor Exam   Muscle bulk: normal  Overall muscle tone: normal  Right arm pronator drift: absent  Left arm pronator drift: absent    Strength   Strength 5/5 throughout.     Sensory Exam   Light touch normal.   Pinprick normal.     Gait, Coordination, and Reflexes     Gait  Gait: normal    Coordination   Finger to nose coordination: normal    Tremor   Resting tremor: absent  Intention tremor: absent  Action tremor: absent    Reflexes   Reflexes 2+ except as noted.       Lab Results (last 24 hours)     Procedure Component Value Units Date/Time    Blood Culture - Blood, Arm, Left [[id number]]  (Normal) Collected: 06/30/22 2026    Specimen: Blood from Arm, Left Updated: 07/04/22 2232     Blood Culture No growth at 4 days    Blood Culture - Blood, Arm, Left [[id number]]  (Normal) Collected: 06/30/22 2030    Specimen: Blood from Arm, Left Updated: 07/04/22 2232     Blood Culture No growth at 4 days    POC Glucose Once [[id number]]  (Abnormal) Collected: 07/04/22 2126    Specimen: Blood Updated: 07/04/22 2137     Glucose 197 mg/dL       Comment: : [id number] Curt EncarnacionianMeter ID: [id number]       POC Glucose Once [[id number]]  (Abnormal) Collected: 07/04/22 1649    Specimen: Blood Updated: 07/04/22 1701     Glucose 157 mg/dL      Comment: : [id number] Mi LeonworthMeter ID: [id number]       POC Glucose Once [[id number]]  (Abnormal) Collected: 07/04/22 1201    Specimen: Blood Updated: 07/04/22 1212     Glucose 236 mg/dL      Comment: : [id number] Mi LeonworthMeter ID: [id number]       Vancomycin, Trough [[id number]]  (Normal) Collected: 07/04/22 0826    Specimen: Blood Updated: 07/04/22 0858     Vancomycin Trough 8.30 mcg/mL     POC Glucose Once [[id number]]  (Abnormal) Collected: 07/04/22 0800    Specimen: Blood Updated: 07/04/22 0820     Glucose 169 mg/dL      Comment: : [id number] Orlando LillyMeter ID: [id number]                 Plan:   Cranial wound infection.  Serratia marcescens.  On cefepime.  New altered mental status this morning.  Lab work-up and CT scan ordered.  Plan PICC line placement today.  Likely will require skilled nursing facility.      Hypertension    GERD (gastroesophageal reflux disease)    Coronary artery disease    Type 2 diabetes mellitus with hyperglycemia and peripheral neuropathy, with long-term current use of insulin (HCC)    Paroxysmal atrial fibrillation with rapid ventricular response (HCC)    Post-operative infection    S/P craniotomy        Felipe Banerjee MD

## 2022-07-05 NOTE — CONSULTS
Patient or patient's representative gives informed consent after an explanation of the risks (air embolism; catheter occlusion; phlebitis; catheter infection; bloodstream infection; vein thrombosis; hematoma/bleeding at the insertion site; slight discomfort; accidental puncture of an artery, nerve, or tendon; dislodging of device), benefits (longer dwell time, outpatient treatment, medications that cannot run peripherally), and alternatives (short peripheral catheter, centrally inserted central line, or permanent catheter) of PICC placement. Time provided to ask and answer questions.    Pt had 4 [place] single lumen PICC placed in left basiolic vein. Pt tolerated procedure well. 45 cm of catheter internal and 1 cm external. Tip confirmation by 3cg. All lumens flush and draw well. Sterile dressing applied.

## 2022-07-05 NOTE — PROGRESS NOTES
"Infectious Diseases Progress Note    Patient:  Elijah Escobar  YOB: 1955  MRN: [id number]   Admit date: 6/30/2022   Admitting Physician: Felipe Banerjee MD  Primary Care Physician: Dylan Lama APRN    Chief Complaint/Interval History: He indicates he has some increased headache today.  Primarily right frontal parietal area.  No new neurological symptoms.  No visual complaints.  He feels the blistered rash area medial aspect of both thighs and bridge of nose is showing improvement.  He is going for head CT scanning this morning.  He is without fever.  He is hemodynamically stable.    Intake/Output Summary (Last 24 hours) at 7/5/2022 0718  Last data filed at 7/4/2022 0936  Gross per 24 hour   Intake --   Output 225 ml   Net -225 ml     Allergies: No Known Allergies  Current Scheduled Medications:   atorvastatin, 20 mg, Oral, Nightly  cefepime, 2 g, Intravenous, Q8H  dilTIAZem CD, 240 mg, Oral, Q24H  famotidine, 40 mg, Oral, Daily  gabapentin, 300 mg, Oral, TID  insulin detemir, 25 Units, Subcutaneous, Nightly  insulin lispro, 3-14 Units, Subcutaneous, TID AC  levETIRAcetam, 500 mg, Oral, BID  lisinopril, 40 mg, Oral, Daily  senna-docusate sodium, 2 tablet, Oral, BID  sodium chloride, 10 mL, Intravenous, Q12H  sotalol, 120 mg, Oral, BID      Current PRN Medications:  •  acetaminophen **OR** acetaminophen **OR** acetaminophen  •  senna-docusate sodium **AND** polyethylene glycol **AND** bisacodyl **AND** bisacodyl  •  dextrose  •  dextrose  •  glucagon (human recombinant)  •  ondansetron **OR** ondansetron  •  oxyCODONE-acetaminophen  •  oxyCODONE-acetaminophen  •  sodium chloride    Review of Systems see HPI.  No nausea, diarrhea, or rash.    Vital Signs:  /88 (BP Location: Right arm, Patient Position: Lying)   Pulse 79   Temp 98.2 °F (36.8 °C) (Oral)   Resp 18   Ht 177.8 cm (70\")   Wt 123 kg (271 lb 9.6 oz)   SpO2 96%   BMI 38.97 kg/m²     Physical Exam  Vital signs - " reviewed.  Line/IV site - No erythema, warmth, induration, or tenderness.  Right parietal dressing clean/dry  Neurological examination nonfocal  Lungs clear without crackles  Abdomen soft and nontender    Lab Results:  CBC:   Results from last 7 days   Lab Units 07/03/22  0324 07/02/22  0546 07/01/22  0556 06/30/22  1225   WBC 10*3/mm3 10.67 13.63* 10.28 14.28*   HEMOGLOBIN g/dL 10.5* 10.8* 9.4* 10.3*   HEMATOCRIT % 32.5* 33.0* 30.5* 31.2*   PLATELETS 10*3/mm3 266 314 280 282     BMP:  Results from last 7 days   Lab Units 07/03/22  0324 07/02/22  0546 07/01/22  0556 06/30/22  1225   SODIUM mmol/L 137 135* 141 139   POTASSIUM mmol/L 4.3 4.4 3.9 4.5   CHLORIDE mmol/L 102 99 106 105   CO2 mmol/L 28.0 22.0 28.0 25.0   BUN mg/dL 12 8 8 8   CREATININE mg/dL 0.86 0.71* 0.77 0.68*   GLUCOSE mg/dL 182* 228* 140* 202*   CALCIUM mg/dL 8.1* 8.4* 8.8 8.8   ALT (SGPT) U/L  --   --  37 45*     Culture Results:   Blood Culture   Date Value Ref Range Status   06/30/2022 No growth at 4 days  Preliminary   06/30/2022 No growth at 4 days  Preliminary     Urine Culture   Date Value Ref Range Status   06/30/2022 <10,000 CFU/mL Gram Positive Cocci (A)  Final     Comment:     Call if further workup needed.       Wound Culture   Date Value Ref Range Status   07/01/2022 Scant growth (1+) Serratia marcescens (A)  Final     Radiology: None  Additional Studies Reviewed: None    Impression:   1.  Recent craniotomy for meningioma  2.  Subgaleal/epidural infection-Serratia-postop day #4 following irrigation and drainage  3.-Impetigo upper nose medial eye area-improving  4.  Diabetes mellitus  5.  Coronary artery disease    Recommendations:   Planning 3-week course of intravenous antibiotic therapy following irrigation and drainage  Final duration will depend upon clinical course including symptoms, exam, and any follow-up imaging  Tentative stop date for IV antibiotic treatment-July 22, 2022  Would like  and nursing assistance to  help determine whether he could receive IV antibiotic treatment at home or whether he might need LTAC or temporary skilled nursing facility placement  Suggest PICC line placement  Continue to follow    Edwin Jeffers MD

## 2022-07-05 NOTE — PROGRESS NOTES
[facility] Medicine Services  INPATIENT PROGRESS NOTE    Patient Name: Elijah Escobar  Date of Admission: 6/30/2022  Today's Date: 07/05/22  Length of Stay: 5  Primary Care Physician: Dylan Lama APRN    Subjective   Chief Complaint: elevated blood sugar  HPI   7/4 Resting in bed, appears tired and mildly ill. Does not offer any new complaints.  7/5 Patient transferred to critical care for closer neurological observation and blood sugar control.     Review of Systems   All pertinent negatives and positives are as above. All other systems have been reviewed and are negative unless otherwise stated.     Objective    Temp:  [98 °F (36.7 °C)-98.6 °F (37 °C)] 98 °F (36.7 °C)  Heart Rate:  [] 73  Resp:  [15-18] 16  BP: (127-155)/() 155/72  Physical Exam  GEN: Awake, alert, interactive, in NAD  HEENT:   Head is bandaged.  EOMI, Anicteric, Trachea midline  Lungs:  no wheezing/rales/rhonchi  Heart: irreg/irreg, +S1/s2, no rub  ABD: soft, nt/nd, +BS, no guarding/rebound  Extremities: atraumatic, no cyanosis  Skin: no rashes or lesions  Neuro: AAOx3, DENISE  Psych: normal mood & affect    Results Review:  I have reviewed the labs, radiology results, and diagnostic studies.    Laboratory Data:   Results from last 7 days   Lab Units 07/05/22  0754 07/03/22 0324 07/02/22  0546   WBC 10*3/mm3 11.26* 10.67 13.63*   HEMOGLOBIN g/dL 10.8* 10.5* 10.8*   HEMATOCRIT % 31.7* 32.5* 33.0*   PLATELETS 10*3/mm3 278 266 314        Results from last 7 days   Lab Units 07/05/22  0754 07/03/22  0324 07/02/22  0546 07/01/22  0556 06/30/22  1225   SODIUM mmol/L 136 137 135* 141 139   POTASSIUM mmol/L 4.8 4.3 4.4 3.9 4.5   CHLORIDE mmol/L 101 102 99 106 105   CO2 mmol/L 22.0 28.0 22.0 28.0 25.0   BUN mg/dL 11 12 8 8 8   CREATININE mg/dL 1.39* 0.86 0.71* 0.77 0.68*   CALCIUM mg/dL 8.6 8.1* 8.4* 8.8 8.8   BILIRUBIN mg/dL 0.5  --   --  0.2 0.3   ALK PHOS U/L 103  --   --  109 127*   ALT (SGPT) U/L  18  --   --  37 45*   AST (SGOT) U/L 12  --   --  19 23   GLUCOSE mg/dL 226* 182* 228* 140* 202*       Culture Data:   Blood Culture   Date Value Ref Range Status   06/30/2022 No growth at 3 days  Preliminary   06/30/2022 No growth at 3 days  Preliminary     Urine Culture   Date Value Ref Range Status   06/30/2022 <10,000 CFU/mL Gram Positive Cocci (A)  Final     Comment:     Call if further workup needed.       Wound Culture   Date Value Ref Range Status   07/01/2022 Scant growth (1+) Serratia marcescens (A)  Final       Radiology Data:   Imaging Results (Last 24 Hours)     Procedure Component Value Units Date/Time    CT Head Without Contrast [[id number]] Collected: 07/05/22 0823     Updated: 07/05/22 0838    Narrative:      EXAMINATION: CT HEAD WO CONTRAST-      7/5/2022 8:13 AM CDT     HISTORY: Altered mental status, nontraumatic (Ped 0-17y);  T81.40XA-Infection following a procedure, unspecified, initial  encounter; Z98.890-Other specified postprocedural states     In order to have a CT radiation dose as low as reasonably achievable  Automated Exposure Control was utilized for adjustment of the mA and/or  KV according to patient size.     DLP in mGycm= 862     The CT scan of the head is performed without intravenous contrast  enhancement. Images are acquired in axial plane with subsequent  reconstruction in coronal and sagittal planes.     Comparison is made with the previous study dated 6/30/2022.     There is interval removal of the right frontal craniotomy flap since the  previous study.     There is moderate diffuse edema of the underlying right frontal lobe  which now bulges through the craniotomy flap which is a change since the  previous study.     There is fluid accumulation between the scalp and the right frontal lobe  which may represent subdural fluid accumulation?. There are some air  bubbles in the area similar to the previous study. This does not  correspond to the previously seen rim-enhancing  fluid collection in the  right frontal subgaleal region?. However there is a smaller collection  located more laterally adjacent to the craniotomy which measure 2.3 x  1.4 cm. This may represent a part of the previously seen fluid  accumulation.     There is a right anterior parafalcine subdural fluid accumulation  measuring 1 cm. This was not seen in the previous study.     There is no evidence of intracranial hemorrhage or hematoma.     There is moderate dilatation of ventricles, basal cisterns and cortical  sulci which are similar to the previous study.     There is no midline shift.     Images reviewed in bone window show no acute bony abnormality. There is  interval removal of the right frontal craniotomy flap.       Impression:      1. Removal of the right frontal the craniotomy flap. The edema of the  right frontal lobe at the site of previous craniotomy and bulging of the  frontal lobe through the craniotomy.  2. Right frontal subdural fluid accumulation extending medially into the  right parafalcine region which was not noted on the previous study.  3. Subgaleal air and fluid accumulation in the right frontal region as  detailed above.                                   This report was finalized on 07/05/2022 08:35 by Dr. Michelle Vigil MD.          I have reviewed the patient's current medications.     Assessment/Plan     Active Hospital Problems    Diagnosis    • Post-operative infection    • S/P craniotomy      Added automatically from request for surgery [id number]     • Paroxysmal atrial fibrillation with rapid ventricular response (HCC)    • Coronary artery disease    • Type 2 diabetes mellitus with hyperglycemia and peripheral neuropathy, with long-term current use of insulin (HCC)    • Hypertension    • GERD (gastroesophageal reflux disease)        Plan:  Status post craniotomy > Per neurosurgery  Suspected epidural infection > ID input noted  DM uncontrolled > Levemir to 25 units BID d/c  Humalog  sliding scale d/c  Now on Humulin R drip     PAF > Off Cardizem drip to Sotalol    Will continue to follow    Discharge Planning: Per attending.    Electronically signed by Angel Sarabia MD, 07/05/22, 16:22 CDT.

## 2022-07-05 NOTE — PROGRESS NOTES
Daily Progress Note    ASSESSMENT:   Elijah Escobar is a 66 y.o. male with a significant medical history of recent craniotomy for tumor, 6/16/2022, coronary artery disease, A. fib, diabetes, erectile dysfunction, GERD, hypertension, hyperlipidemia. He presents with a new problem of a persistent right sided headache, particularly over the right temporal region and dizziness with standing.  Physical exam findings of tense but fluctuant area of edema from the mid frontal to right parietal region of the scalp, the area is tender to palpation, periorbital lesions with purulent drainage from the left eye, scalp incision is clean, dry, and intact, bright and awake, oriented x3, speech is clear, follows commands without prompting showing thumbs up and 2 fingers bilaterally, no pronator drift, left dysmetria, gross hyporeflexia, and bilateral Babinski.  CT of the head from 6/23/2022, showing expected postsurgical changes to the right frontal lobe, small amount of fluid collection and pneumocephalus in the right subdural space, small amount of vasogenic edema with 4.3 mm left-to-right midline shift.  Repeat CT of the head obtained 6/30/2022 shows no acute intracranial blood products, unresolved vasogenic edema, midline shift resolved, increased left frontal and temporal soft tissue swelling.  MRI of the brain from 6/30/2022 shows a 7.9 x 6.5 x 0.9 cm rim-enhancing fluid collection overlying the bone flap concerning for pseudomeningocele versus infection.    Past Medical History:   Diagnosis Date   • Brain tumor (HCC)    • Coronary artery disease    • COVID-19 vaccine series completed     MODERNA X 3; LAST DOSE 3/2022   • Diabetes (HCC)    • Erectile dysfunction    • GERD (gastroesophageal reflux disease)    • Hypercholesteremia    • Hypertension      Active Hospital Problems    Diagnosis    • Post-operative infection    • S/P craniotomy      Added automatically from request for surgery [id number]     • Paroxysmal atrial  "fibrillation with rapid ventricular response (HCC)    • Coronary artery disease    • Type 2 diabetes mellitus with hyperglycemia and peripheral neuropathy, with long-term current use of insulin (HCC)    • Hypertension    • GERD (gastroesophageal reflux disease)      CHIEF COMPLAINT:  Right sided headache and intermittent dizziness     PLAN:   Neuro: Stable.  Neuro at baseline at present.  Bright awake.  Oriented x3   4 Days Post-Op (7/1/2022) I&D and washout and craniectomy   Wound culture positive for Serratia   Continue Keppra   Transfer to ICU for close neurologic monitoring and to begin insulin drip      CV: Continuous cardiac monitoring.  PICC line placement today  Pulm: Continuous pulse oximetry.  O2.  Maintaining O2 sat.  : Voiding.  Bladder scans and I/O cath per policy  FEN: Carb consistent diet.  Advance as tolerated  GI: No acute issues  ENDO: Insulin drip for tight glucose control.  Accu-Cheks per policy.  ID: [id number].  Wound culture positive for Serratia.  Continue cefepime per ID.  Weekly CRP and ESR.  Heme:  DVT prophylaxis, SCDs  Pain: Tolerable at present  Dispo: PT/OT    Subjective  Symptom stable.  Doing well    Temp:  [98.2 °F (36.8 °C)-98.6 °F (37 °C)] 98.4 °F (36.9 °C)  Heart Rate:  [] 88  Resp:  [18] 18  BP: (127-152)/(72-89) 139/87    Objective:  General Appearance:  Well-appearing and in no acute distress.    Vital signs: (most recent): Blood pressure 142/68, pulse 82, temperature 98 °F (36.7 °C), temperature source Oral, resp. rate 18, height 177.8 cm (70\"), weight 117 kg (258 lb 0.5 oz), SpO2 97 %.      Neurologic Exam     Mental Status   Oriented to person, place, and time.   Attention: normal. Concentration: normal.   Speech: speech is normal   Level of consciousness: alert    Cranial Nerves     CN II   Visual fields full to confrontation.     CN III, IV, VI   Pupils are equal, round, and reactive to light.  Extraocular motions are normal.     CN V   Facial sensation intact. "     CN VII   Facial expression full, symmetric.     CN VIII   CN VIII normal.     CN IX, X   CN IX normal.     CN XI   CN XI normal.     Motor Exam   Right arm tone: normal  Left arm tone: normal  Right leg tone: normal  Left leg tone: normal    Strength   Right deltoid: 5/5  Left deltoid: 5/5  Right biceps: 5/5  Left biceps: 5/5  Right triceps: 5/5  Left triceps: 5/5  Right wrist extension: 5/5  Left wrist extension: 5/5  Right iliopsoas: 5/5  Left iliopsoas: 5/5  Right quadriceps: 5/5  Left quadriceps: 5/5  Right anterior tibial: 5/5  Left anterior tibial: 5/5  Right gastroc: 5/5  Left gastroc: 5/5  Right EHL 5/5  Left EHL 5/5       Sensory Exam   Right arm light touch: normal  Left arm light touch: normal  Right leg light touch: normal  Left leg light touch: normal    Gait, Coordination, and Reflexes     Tremor   Resting tremor: absent  Intention tremor: absent  Action tremor: absent    DRAINS:   [REMOVED] Closed/Suction Drain 1 Scalp Bulb (Removed)   Site Description Unable to view 06/19/22 0735   Dressing Status Clean;Dry;Intact 06/19/22 0735   Drainage Appearance Serosanguineous 06/19/22 0735   Status To bulb suction 06/19/22 0735   Output (mL) 5 mL 06/19/22 0723       [REMOVED] Urethral Catheter Non-latex;Silicone 16 Fr. (Removed)   Daily Indications Selected surgeries ( tract, abdomen) 06/16/22 1339   Site Assessment Clean;Skin intact 06/16/22 1339   Collection Container Standard drainage bag 06/16/22 1339   Securement Method Securing device 06/16/22 1339   Output (mL) 1100 mL 06/16/22 1233     LAB RESULTS:  ABG:     CBC:   Results from last 7 days   Lab Units 07/05/22  0754 07/03/22  0324 07/02/22  0546 07/01/22  0556 06/30/22  1225   WBC 10*3/mm3 11.26* 10.67 13.63* 10.28 14.28*   HEMOGLOBIN g/dL 10.8* 10.5* 10.8* 9.4* 10.3*   HEMATOCRIT % 31.7* 32.5* 33.0* 30.5* 31.2*   PLATELETS 10*3/mm3 278 266 314 280 282     BMP:  Results from last 7 days   Lab Units 07/05/22  0754 07/03/22  0324 07/02/22  0546  07/01/22  0556 06/30/22  1225   SODIUM mmol/L 136 137 135* 141 139   POTASSIUM mmol/L 4.8 4.3 4.4 3.9 4.5   CHLORIDE mmol/L 101 102 99 106 105   CO2 mmol/L 22.0 28.0 22.0 28.0 25.0   BUN mg/dL 11 12 8 8 8   CREATININE mg/dL 1.39* 0.86 0.71* 0.77 0.68*   GLUCOSE mg/dL 226* 182* 228* 140* 202*   CALCIUM mg/dL 8.6 8.1* 8.4* 8.8 8.8   ALT (SGPT) U/L 18  --   --  37 45*     Culture Results:   Blood Culture   Date Value Ref Range Status   06/30/2022 No growth at 4 days  Preliminary   06/30/2022 No growth at 4 days  Preliminary     Urine Culture   Date Value Ref Range Status   06/30/2022 <10,000 CFU/mL Gram Positive Cocci (A)  Final     Comment:     Call if further workup needed.       Wound Culture   Date Value Ref Range Status   07/01/2022 Scant growth (1+) Serratia marcescens (A)  Final     Imaging Results (Last 24 Hours)     Procedure Component Value Units Date/Time    CT Head Without Contrast [[id number]] Collected: 07/05/22 0823     Updated: 07/05/22 0838    Narrative:      EXAMINATION: CT HEAD WO CONTRAST-      7/5/2022 8:13 AM CDT     HISTORY: Altered mental status, nontraumatic (Ped 0-17y);  T81.40XA-Infection following a procedure, unspecified, initial  encounter; Z98.890-Other specified postprocedural states     In order to have a CT radiation dose as low as reasonably achievable  Automated Exposure Control was utilized for adjustment of the mA and/or  KV according to patient size.     DLP in mGycm= 862     The CT scan of the head is performed without intravenous contrast  enhancement. Images are acquired in axial plane with subsequent  reconstruction in coronal and sagittal planes.     Comparison is made with the previous study dated 6/30/2022.     There is interval removal of the right frontal craniotomy flap since the  previous study.     There is moderate diffuse edema of the underlying right frontal lobe  which now bulges through the craniotomy flap which is a change since the  previous study.     There is  fluid accumulation between the scalp and the right frontal lobe  which may represent subdural fluid accumulation?. There are some air  bubbles in the area similar to the previous study. This does not  correspond to the previously seen rim-enhancing fluid collection in the  right frontal subgaleal region?. However there is a smaller collection  located more laterally adjacent to the craniotomy which measure 2.3 x  1.4 cm. This may represent a part of the previously seen fluid  accumulation.     There is a right anterior parafalcine subdural fluid accumulation  measuring 1 cm. This was not seen in the previous study.     There is no evidence of intracranial hemorrhage or hematoma.     There is moderate dilatation of ventricles, basal cisterns and cortical  sulci which are similar to the previous study.     There is no midline shift.     Images reviewed in bone window show no acute bony abnormality. There is  interval removal of the right frontal craniotomy flap.       Impression:      1. Removal of the right frontal the craniotomy flap. The edema of the  right frontal lobe at the site of previous craniotomy and bulging of the  frontal lobe through the craniotomy.  2. Right frontal subdural fluid accumulation extending medially into the  right parafalcine region which was not noted on the previous study.  3. Subgaleal air and fluid accumulation in the right frontal region as  detailed above.                                   This report was finalized on 07/05/2022 08:35 by Dr. Michelle Vigil MD.        Lab Results (last 24 hours)     Procedure Component Value Units Date/Time    C-reactive Protein [[id number]] Collected: 07/05/22 0754    Specimen: Blood Updated: 07/05/22 1004    Comprehensive Metabolic Panel [[id number]]  (Abnormal) Collected: 07/05/22 0754    Specimen: Blood Updated: 07/05/22 0820     Glucose 226 mg/dL      BUN 11 mg/dL      Creatinine 1.39 mg/dL      Sodium 136 mmol/L      Potassium 4.8 mmol/L       Comment: Slight hemolysis detected by analyzer. Results may be affected.        Chloride 101 mmol/L      CO2 22.0 mmol/L      Calcium 8.6 mg/dL      Total Protein 6.9 g/dL      Albumin 2.90 g/dL      ALT (SGPT) 18 U/L      AST (SGOT) 12 U/L      Comment: Slight hemolysis detected by analyzer. Results may be affected.        Alkaline Phosphatase 103 U/L      Total Bilirubin 0.5 mg/dL      Globulin 4.0 gm/dL      A/G Ratio 0.7 g/dL      BUN/Creatinine Ratio 7.9     Anion Gap 13.0 mmol/L      eGFR 55.9 mL/min/1.73      Comment: National Kidney Foundation and American Society of Nephrology (ASN) Task Force recommended calculation based on the Chronic Kidney Disease Epidemiology Collaboration (CKD-EPI) equation refit without adjustment for race.       Narrative:      GFR Normal >60  Chronic Kidney Disease <60  Kidney Failure <15      CBC (No Diff) [[id number]]  (Abnormal) Collected: 07/05/22 0754    Specimen: Blood Updated: 07/05/22 0802     WBC 11.26 10*3/mm3      RBC 3.42 10*6/mm3      Hemoglobin 10.8 g/dL      Hematocrit 31.7 %      MCV 92.7 fL      MCH 31.6 pg      MCHC 34.1 g/dL      RDW 12.4 %      RDW-SD 41.7 fl      MPV 9.6 fL      Platelets 278 10*3/mm3     POC Glucose Once [[id number]]  (Abnormal) Collected: 07/05/22 0729    Specimen: Blood Updated: 07/05/22 0740     Glucose 205 mg/dL      Comment: : [id number] Casimiro GarciaMeter ID: [id number]       Blood Culture - Blood, Arm, Left [[id number]]  (Normal) Collected: 06/30/22 2026    Specimen: Blood from Arm, Left Updated: 07/04/22 2232     Blood Culture No growth at 4 days    Blood Culture - Blood, Arm, Left [[id number]]  (Normal) Collected: 06/30/22 2030    Specimen: Blood from Arm, Left Updated: 07/04/22 2232     Blood Culture No growth at 4 days    POC Glucose Once [[id number]]  (Abnormal) Collected: 07/04/22 2126    Specimen: Blood Updated: 07/04/22 2137     Glucose 197 mg/dL      Comment: : [id number] Elias SebastianMeter ID: [id number]       POC Glucose  Once [[id number]]  (Abnormal) Collected: 07/04/22 1649    Specimen: Blood Updated: 07/04/22 1701     Glucose 157 mg/dL      Comment: : [id number] Mi FaulknerMeter ID: [id number]       POC Glucose Once [[id number]]  (Abnormal) Collected: 07/04/22 1201    Specimen: Blood Updated: 07/04/22 1212     Glucose 236 mg/dL      Comment: : [id number] Mi FaulknerMeter ID: [id number]           Rahul Arnold, APRN

## 2022-07-05 NOTE — THERAPY EVALUATION
Patient Name: Elijah Escobar  : 1955    MRN: [id number]                              Today's Date: 2022       Admit Date: 2022    Visit Dx:     ICD-10-CM ICD-9-CM   1. Postoperative infection, unspecified type, initial encounter  T81.40XA 998.59   2. S/P craniotomy  Z98.890 V45.89   3. Impaired mobility and ADLs  Z74.09 V49.89    Z78.9      Patient Active Problem List   Diagnosis   • Meningioma s/p craniotomy   • Hypertension   • GERD (gastroesophageal reflux disease)   • Coronary artery disease   • Morbid obesity (HCC)   • Type 2 diabetes mellitus with hyperglycemia and peripheral neuropathy, with long-term current use of insulin (HCC)   • Leukocytosis   • Other specified anemias   • Paroxysmal atrial fibrillation with rapid ventricular response (HCC)   • Atrial fibrillation with RVR (HCC)   • Post-operative infection   • S/P craniotomy     Past Medical History:   Diagnosis Date   • Brain tumor (HCC)    • Coronary artery disease    • COVID-19 vaccine series completed     MODERNA X 3; LAST DOSE 3/2022   • Diabetes (HCC)    • Erectile dysfunction    • GERD (gastroesophageal reflux disease)    • Hypercholesteremia    • Hypertension      Past Surgical History:   Procedure Laterality Date   • BALLOON ANGIOPLASTY, ARTERY Right    • COLONOSCOPY     • CRANIECTOMY Right 2022    Procedure: CRANIECTOMY WITH INCISIONAL WASHOUT;  Surgeon: Felipe Banerjee MD;  Location:  [facility];  Service: Neurosurgery;  Laterality: Right;   • CRANIOTOMY FOR TUMOR Right 2022    Procedure: CRANIOTOMY FOR TUMOR STERIOTACTIC WITH BRAIN LAB, right;  Surgeon: Flaco Portillo MD;  Location:  [facility];  Service: Neurosurgery;  Laterality: Right;      General Information     Row Name 22          OT Time and Intention    Document Type evaluation  -MW     Mode of Treatment occupational therapy  -MW     Row Name 22          General Information    Patient Profile Reviewed yes  -MW     Prior Level  of Function independent:;all household mobility;community mobility;driving  pt unable to provide PLOF  -     Existing Precautions/Restrictions fall  -     Row Name 07/05/22 0830          Occupational Profile    Reason for Services/Referral (Occupational Profile) Presented to ED with persistent right sided headache, particularly over the right temporal region and dizziness with standing; sx: washout with craniectomy 7/1  -MW     Row Name 07/05/22 0830          Living Environment    People in Home spouse  -     Row Name 07/05/22 0830          Home Main Entrance    Number of Stairs, Main Entrance none  -MW     Row Name 07/05/22 0830          Stairs Within Home, Primary    Number of Stairs, Within Home, Primary none  -MW     Row Name 07/05/22 0830          Cognition    Orientation Status (Cognition) oriented to;person;place;situation;disoriented to;time  mixes up numbers for birthday  -     Row Name 07/05/22 0830          Safety Issues, Functional Mobility    Impairments Affecting Function (Mobility) balance;cognition;endurance/activity tolerance  -     Cognitive Impairments, Mobility Safety/Performance attention;awareness, need for assistance;insight into deficits/self-awareness;judgment;problem-solving/reasoning  -           User Key  (r) = Recorded By, (t) = Taken By, (c) = Cosigned By    Initials Name Provider Type     Maya Rodriguez, OTR/L Occupational Therapist                 Mobility/ADL's     Row Name 07/05/22 0830          Bed Mobility    Bed Mobility supine-sit;sit-supine  -     Supine-Sit [place] (Bed Mobility) contact guard;minimum assist (75% patient effort);verbal cues  -     Sit-Supine [place] (Bed Mobility) standby assist  -     Assistive Device (Bed Mobility) head of bed elevated;bed rails  -     Row Name 07/05/22 0830          Transfers    Transfers sit-stand transfer  -     Sit-Stand [place] (Transfers) contact guard;minimum assist (75% patient effort)  -      [facility] Name 07/05/22 0830          Sit-Stand Transfer    Assistive Device (Sit-Stand Transfers) --  HHA once in standing for stability  -[facility] Name 07/05/22 0830          Functional Mobility    Functional Mobility- Ind. Level minimum assist (75% patient effort)  -     Functional Mobility- Device --  HHA  -     Functional Mobility- Comment took two steps toward HOB, difficulty taking the steps and advancing legs, leans too far forward with need for assist for balance  -[facility] Name 07/05/22 0830          Activities of Daily Living    BADL Assessment/Intervention lower body dressing  -[facility] Name 07/05/22 0830          Lower Body Dressing Assessment/Training    [place] Level (Lower Body Dressing) don;doff;socks;moderate assist (50% patient effort)  -     Position (Lower Body Dressing) edge of bed sitting  -     Comment, (Lower Body Dressing) adjusts socks automatically upon sitting up but difficulty with sitting balance and does not pull up fully to eliminate fall risk, CGA provided for sitting balance; anticipate need for up to modA for full LB task completion, pt does state that his spouse assists with dressing skills at baseline  -           User Key  (r) = Recorded By, (t) = Taken By, (c) = Cosigned By    Initials Name Provider Type     Maya Rodriguez, OTR/L Occupational Therapist               Obj/Interventions     [facility] Name 07/05/22 0830          Sensory Assessment (Somatosensory)    Sensory Assessment pt reports no diminished or inequalities with light touch screen  -[facility] Name 07/05/22 0830          Vision Assessment/Intervention    Vision Assessment Comment reports no visual changes but needs cues to stay on task, unsure of accuracy of report  -[facility] Name 07/05/22 0830          Range of Motion Comprehensive    General Range of Motion bilateral upper extremity ROM WFL  -     Comment, General Range of Motion decreased participation in assessment but achieves functional  ranges  -     Row Name 07/05/22 0830          Strength Comprehensive (MMT)    Comment, General Manual Muscle Testing (MMT) Assessment functionally 4-/5, decreased participation with assessment  -     Row Name 07/05/22 0830          Motor Skills    Motor Skills coordination  -     Coordination --  limited participation with assessment, touches all fingers but does not follow directions provided to assess accuracy of coordination  -[facility] Name 07/05/22 0830          Balance    Balance Assessment sitting static balance;sitting dynamic balance;standing static balance  -     Static Sitting Balance standby assist  -     Dynamic Sitting Balance contact guard  -MW     Position, Sitting Balance sitting edge of bed  -     Static Standing Balance contact guard;minimal assist  -MW     Dynamic Standing Balance minimal assist  -MW     Position/Device Used, Standing Balance supported  HHA  -           User Key  (r) = Recorded By, (t) = Taken By, (c) = Cosigned By    Initials Name Provider Type    MW Maya Rodriguez, OTR/L Occupational Therapist               Goals/Plan     [facility] Name 07/05/22 0830          Transfer Goal 1 (OT)    Activity/Assistive Device (Transfer Goal 1, OT) sit-to-stand/stand-to-sit;bed-to-chair/chair-to-bed;toilet;shower chair;commode, bedside without drop arms  -     [place] Level/Cues Needed (Transfer Goal 1, OT) contact guard required  -MW     Time Frame (Transfer Goal 1, OT) long term goal (LTG);10 days  -MW     Progress/Outcome (Transfer Goal 1, OT) new goal  -[facility] Name 07/05/22 0830          Dressing Goal 1 (OT)    Activity/Device (Dressing Goal 1, OT) dressing skills, all  -MW     [place]/Cues Needed (Dressing Goal 1, OT) minimum assist (75% or more patient effort)  -MW     Time Frame (Dressing Goal 1, OT) long term goal (LTG);10 days  -MW     Progress/Outcome (Dressing Goal 1, OT) new goal  -[facility] Name 07/05/22 0830          Toileting Goal 1 (OT)     Activity/Device (Toileting Goal 1, OT) toileting skills, all  -MW     [place] Level/Cues Needed (Toileting Goal 1, OT) minimum assist (75% or more patient effort)  -MW     Time Frame (Toileting Goal 1, OT) long term goal (LTG);10 days  -MW     Progress/Outcome (Toileting Goal 1, OT) new goal  -     Row Name 07/05/22 0830          Self-Feeding Goal 1 (OT)    Activity/Device (Self-Feeding Goal 1, OT) self-feeding skills, all  -MW     [place] Level/Cues Needed (Self-Feeding Goal 1, OT) minimum assist (75% or more patient effort)  -MW     Time Frame (Self-Feeding Goal 1, OT) long term goal (LTG);10 days  -MW     Progress/Outcomes (Self-Feeding Goal 1, OT) new goal  -     Row Name 07/05/22 0830          Therapy Assessment/Plan (OT)    Planned Therapy Interventions (OT) activity tolerance training;BADL retraining;cognitive/visual perception retraining;functional balance retraining;IADL retraining;occupation/activity based interventions;neuromuscular control/coordination retraining;patient/caregiver education/training;strengthening exercise;transfer/mobility retraining  -           User Key  (r) = Recorded By, (t) = Taken By, (c) = Cosigned By    Initials Name Provider Type     Maya Rodriguez, OTR/L Occupational Therapist               Clinical Impression     Row Name 07/05/22 0830          Pain Assessment    Pain Intervention(s) Medication (See MAR);Repositioned;Ambulation/increased activity  -     Additional Documentation Pain Scale: Word Pre/Post-Treatment (Group)  -     Row Name 07/05/22 0830          Plan of Care Review    Plan of Care Reviewed With patient  -     Progress no change  -     Outcome Evaluation OT eval completed. Pt confused and drowsy, reports recent pain medication admin prior to eval. Needs cues and repetition of directions throughout to attend, follow commands, and initiate tasks/mobility. Need for assist for sitting and standing balance with anterior lean observed and  decreased insight to deficits. Need for up to Ady HHA to take a couple steps toward HOB, need for assist through gait belt to prevent fall 2' anterior lean and decreased motor planning with BLE. Difficulty following commands for formal assessment with coordination, ROM, and strength however appears WFL - will cont to assess. OT indicated to address executive functioning, balance, and motor control to increase independence and decrease fall risk. Recommend d/c inpatient rehab.  -     Row Name 07/05/22 0830          Therapy Assessment/Plan (OT)    Rehab Potential (OT) good, to achieve stated therapy goals  -     Criteria for Skilled Therapeutic Interventions Met (OT) yes;skilled treatment is necessary  -     Therapy Frequency (OT) 5 times/wk  -MW     Predicted Duration of Therapy Intervention (OT) 10 days  -     Row Name 07/05/22 0830          Therapy Plan Review/Discharge Plan (OT)    Anticipated Discharge Disposition (OT) inpatient rehabilitation facility  -     Row Name 07/05/22 0830          Positioning and Restraints    Pre-Treatment Position in bed  -MW     Post Treatment Position bed  -MW     In Bed fowlers;call light within reach;encouraged to call for assist;exit alarm on;side rails up x3  -MW     Row Name 07/05/22 0830          Pain Scale: Word Pre/Post-Treatment    Pain: Word Scale, Pretreatment 6 - moderate-severe pain  -MW     Posttreatment Pain Rating 6 - moderate-severe pain  -MW     Pain Location - head  -MW           User Key  (r) = Recorded By, (t) = Taken By, (c) = Cosigned By    Initials Name Provider Type     Maya Rodriguez, OTR/L Occupational Therapist               Outcome Measures     Row Name 07/05/22 0830          How much help from another is currently needed...    Putting on and taking off regular lower body clothing? 2  -MW     Bathing (including washing, rinsing, and drying) 2  -MW     Toileting (which includes using toilet bed pan or urinal) 2  -MW     Putting on and  taking off regular upper body clothing 2  -MW     Taking care of personal grooming (such as brushing teeth) 2  -MW     Eating meals 2  -MW     AM-PAC 6 Clicks Score (OT) 12  -MW     Row Name 07/05/22 0830          Functional Assessment    Outcome Measure Options AM-PAC 6 Clicks Daily Activity (OT)  -           User Key  (r) = Recorded By, (t) = Taken By, (c) = Cosigned By    Initials Name Provider Type     Maya Rodriguez, OTR/L Occupational Therapist                Occupational Therapy Education                 Title: PT OT SLP Therapies (In Progress)     Topic: Occupational Therapy (In Progress)     Point: ADL training (In Progress)     Description:   Instruct learner(s) on proper safety adaptation and remediation techniques during self care or transfers.   Instruct in proper use of assistive devices.              Learning Progress Summary           Patient Acceptance, E,D, NR by  at 7/5/2022 0942                   Point: Home exercise program (In Progress)     Description:   Instruct learner(s) on appropriate technique for monitoring, assisting and/or progressing therapeutic exercises/activities.              Learning Progress Summary           Patient Acceptance, E,D, NR by  at 7/5/2022 0942                               User Key     Initials Effective Dates Name Provider Type Discipline     08/28/18 -  Maya Rodriguez, OTR/L Occupational Therapist OT              OT Recommendation and Plan  Planned Therapy Interventions (OT): activity tolerance training, BADL retraining, cognitive/visual perception retraining, functional balance retraining, IADL retraining, occupation/activity based interventions, neuromuscular control/coordination retraining, patient/caregiver education/training, strengthening exercise, transfer/mobility retraining  Therapy Frequency (OT): 5 times/wk  Plan of Care Review  Plan of Care Reviewed With: patient  Progress: no change  Outcome Evaluation: OT eval completed. Pt confused  and drowsy, reports recent pain medication admin prior to eval. Needs cues and repetition of directions throughout to attend, follow commands, and initiate tasks/mobility. Need for assist for sitting and standing balance with anterior lean observed and decreased insight to deficits. Need for up to [place] HHA to take a couple steps toward HOB, need for assist through gait belt to prevent fall 2' anterior lean and decreased motor planning with BLE. Difficulty following commands for formal assessment with coordination, ROM, and strength however appears WFL - will cont to assess. OT indicated to address executive functioning, balance, and motor control to increase independence and decrease fall risk. Recommend d/c inpatient rehab.     Time Calculation:    Time Calculation- OT     Row Name 07/05/22 0942             Time Calculation- OT    OT Start Time 0830  +7 min chart review  -      OT Stop Time 0852  -MW      OT Time Calculation (min) 22 min  -MW      OT Received On 07/05/22  -      OT Goal Re-Cert Due Date 07/15/22  -            User Key  (r) = Recorded By, (t) = Taken By, (c) = Cosigned By    Initials Name Provider Type    Maya Moore OTR/L Occupational Therapist              Therapy Charges for Today     Code Description Service Date Service Provider Modifiers Qty    [id number] HC OT EVAL MOD COMPLEXITY 2 7/5/2022 Maya Rodriguez OTR/L GO 1               PETER Rueda/NALLELY  7/5/2022

## 2022-07-05 NOTE — PLAN OF CARE
Goal Outcome Evaluation:  Plan of Care Reviewed With: patient        Progress: no change  Outcome Evaluation: OT eval completed. Pt confused and drowsy, reports recent pain medication admin prior to eval. Needs cues and repetition of directions throughout to attend, follow commands, and initiate tasks/mobility. Need for assist for sitting and standing balance with anterior lean observed and decreased insight to deficits. Need for up to [place] HHA to take a couple steps toward HOB, need for assist through gait belt to prevent fall 2' anterior lean and decreased motor planning with BLE. Difficulty following commands for formal assessment with coordination, ROM, and strength however appears WFL - will cont to assess. OT indicated to address executive functioning, balance, and motor control to increase independence and decrease fall risk. Recommend d/c inpatient rehab.

## 2022-07-05 NOTE — NURSING NOTE
Bedside neuro with SELENA Dove. Pt alert and oriented to name and year. Thinks he is at home. Increased confusion first noted around 0530.

## 2022-07-05 NOTE — PLAN OF CARE
Problem: Adult Inpatient Plan of Care  Goal: Plan of Care Review  Outcome: Ongoing, Progressing  Flowsheets (Taken 7/5/2022 7544)  Progress: no change  Plan of Care Reviewed With: patient  Outcome Evaluation: A&Ox4. Up SB to BR. Pt confused initially when waking up, becomes reoriented with minimal difficulty. Pt attempts to ambulate to BR himself - bed alarm on. Pain well controlled with prn pain meds. Afib on tele. BGM. Head dressing CDI. Pt has rested well most of shift. Call light in reach. Safety maintained.

## 2022-07-05 NOTE — CASE MANAGEMENT/SOCIAL WORK
Continued Stay Note   [place]     Patient Name: Elijah Escobar  MRN: [id number]  Today's Date: 7/5/2022    Admit Date: 6/30/2022     Discharge Plan     Row Name 07/05/22 1157       Plan    Plan Patient being transferred to CCU bed 2 for close monitoring of Blood sugar. Sister, Brina Pacheco notified of transfer. Patient wanting SNF near [place] for continued rehab needs . SW to follow for referral. Pending bed offers. No caregiver in home to assist with antibx therapy.     Brina Pacheco called CM to inform of first choice of SNF is [facility], 2nd choice [place], [place].  Would prefer for patient to be closer to home for antibx therapy 3 weeks but will also be open to [place] if necessary d/t staffing at facility. She wants what is best for her brother. SW updated. Referrals to follow. Pending bed offer/Precert.               Discharge Codes    No documentation.               Expected Discharge Date and Time     Expected Discharge Date Expected Discharge Time    Jul 7, 2022             Natalia Parham RN

## 2022-07-06 LAB
ALBUMIN SERPL-MCNC: 2.9 G/DL (ref 3.5–5.2)
ALBUMIN/GLOB SERPL: 0.8 G/DL
ALP SERPL-CCNC: 100 U/L (ref 39–117)
ALT SERPL W P-5'-P-CCNC: 16 U/L (ref 1–41)
ANION GAP SERPL CALCULATED.3IONS-SCNC: 9 MMOL/L (ref 5–15)
AST SERPL-CCNC: 11 U/L (ref 1–40)
BACTERIA SPEC AEROBE CULT: NO GROWTH
BACTERIA SPEC ANAEROBE CULT: NORMAL
BASOPHILS # BLD AUTO: 0.03 10*3/MM3 (ref 0–0.2)
BASOPHILS NFR BLD AUTO: 0.4 % (ref 0–1.5)
BILIRUB SERPL-MCNC: 0.5 MG/DL (ref 0–1.2)
BUN SERPL-MCNC: 9 MG/DL (ref 8–23)
BUN/CREAT SERPL: 11.7 (ref 7–25)
CALCIUM SPEC-SCNC: 8.7 MG/DL (ref 8.6–10.5)
CHLORIDE SERPL-SCNC: 103 MMOL/L (ref 98–107)
CO2 SERPL-SCNC: 26 MMOL/L (ref 22–29)
CREAT SERPL-MCNC: 0.77 MG/DL (ref 0.76–1.27)
DEPRECATED RDW RBC AUTO: 43.2 FL (ref 37–54)
EGFRCR SERPLBLD CKD-EPI 2021: 98.7 ML/MIN/1.73
EOSINOPHIL # BLD AUTO: 0.07 10*3/MM3 (ref 0–0.4)
EOSINOPHIL NFR BLD AUTO: 0.8 % (ref 0.3–6.2)
ERYTHROCYTE [DISTWIDTH] IN BLOOD BY AUTOMATED COUNT: 12.3 % (ref 12.3–15.4)
GLOBULIN UR ELPH-MCNC: 3.8 GM/DL
GLUCOSE BLDC GLUCOMTR-MCNC: 102 MG/DL (ref 70–130)
GLUCOSE BLDC GLUCOMTR-MCNC: 109 MG/DL (ref 70–130)
GLUCOSE BLDC GLUCOMTR-MCNC: 109 MG/DL (ref 70–130)
GLUCOSE BLDC GLUCOMTR-MCNC: 118 MG/DL (ref 70–130)
GLUCOSE BLDC GLUCOMTR-MCNC: 119 MG/DL (ref 70–130)
GLUCOSE BLDC GLUCOMTR-MCNC: 120 MG/DL (ref 70–130)
GLUCOSE BLDC GLUCOMTR-MCNC: 120 MG/DL (ref 70–130)
GLUCOSE BLDC GLUCOMTR-MCNC: 121 MG/DL (ref 70–130)
GLUCOSE BLDC GLUCOMTR-MCNC: 121 MG/DL (ref 70–130)
GLUCOSE BLDC GLUCOMTR-MCNC: 122 MG/DL (ref 70–130)
GLUCOSE BLDC GLUCOMTR-MCNC: 129 MG/DL (ref 70–130)
GLUCOSE BLDC GLUCOMTR-MCNC: 92 MG/DL (ref 70–130)
GLUCOSE BLDC GLUCOMTR-MCNC: 93 MG/DL (ref 70–130)
GLUCOSE SERPL-MCNC: 102 MG/DL (ref 65–99)
HCT VFR BLD AUTO: 32.5 % (ref 37.5–51)
HGB BLD-MCNC: 10.3 G/DL (ref 13–17.7)
IMM GRANULOCYTES # BLD AUTO: 0.03 10*3/MM3 (ref 0–0.05)
IMM GRANULOCYTES NFR BLD AUTO: 0.4 % (ref 0–0.5)
LYMPHOCYTES # BLD AUTO: 2.16 10*3/MM3 (ref 0.7–3.1)
LYMPHOCYTES NFR BLD AUTO: 25.5 % (ref 19.6–45.3)
MAGNESIUM SERPL-MCNC: 2.1 MG/DL (ref 1.6–2.4)
MCH RBC QN AUTO: 30.6 PG (ref 26.6–33)
MCHC RBC AUTO-ENTMCNC: 31.7 G/DL (ref 31.5–35.7)
MCV RBC AUTO: 96.4 FL (ref 79–97)
MONOCYTES # BLD AUTO: 0.58 10*3/MM3 (ref 0.1–0.9)
MONOCYTES NFR BLD AUTO: 6.8 % (ref 5–12)
NEUTROPHILS NFR BLD AUTO: 5.61 10*3/MM3 (ref 1.7–7)
NEUTROPHILS NFR BLD AUTO: 66.1 % (ref 42.7–76)
NRBC BLD AUTO-RTO: 0 /100 WBC (ref 0–0.2)
PHOSPHATE SERPL-MCNC: 3.4 MG/DL (ref 2.5–4.5)
PLATELET # BLD AUTO: 261 10*3/MM3 (ref 140–450)
PMV BLD AUTO: 9.6 FL (ref 6–12)
POTASSIUM SERPL-SCNC: 3.8 MMOL/L (ref 3.5–5.2)
PROT SERPL-MCNC: 6.7 G/DL (ref 6–8.5)
QT INTERVAL: 480 MS
QTC INTERVAL: 491 MS
RBC # BLD AUTO: 3.37 10*6/MM3 (ref 4.14–5.8)
SODIUM SERPL-SCNC: 138 MMOL/L (ref 136–145)
WBC NRBC COR # BLD: 8.48 10*3/MM3 (ref 3.4–10.8)

## 2022-07-06 PROCEDURE — 82962 GLUCOSE BLOOD TEST: CPT

## 2022-07-06 PROCEDURE — 99024 POSTOP FOLLOW-UP VISIT: CPT | Performed by: NURSE PRACTITIONER

## 2022-07-06 PROCEDURE — 80053 COMPREHEN METABOLIC PANEL: CPT | Performed by: FAMILY MEDICINE

## 2022-07-06 PROCEDURE — 05HY33Z INSERTION OF INFUSION DEVICE INTO UPPER VEIN, PERCUTANEOUS APPROACH: ICD-10-PCS | Performed by: NEUROLOGICAL SURGERY

## 2022-07-06 PROCEDURE — 25010000002 CEFEPIME PER 500 MG: Performed by: NURSE PRACTITIONER

## 2022-07-06 PROCEDURE — 83735 ASSAY OF MAGNESIUM: CPT | Performed by: FAMILY MEDICINE

## 2022-07-06 PROCEDURE — 0 INSULIN REGULAR HUMAN PER 5 UNITS: Performed by: NURSE PRACTITIONER

## 2022-07-06 PROCEDURE — 97116 GAIT TRAINING THERAPY: CPT

## 2022-07-06 PROCEDURE — 85025 COMPLETE CBC W/AUTO DIFF WBC: CPT | Performed by: FAMILY MEDICINE

## 2022-07-06 PROCEDURE — 97535 SELF CARE MNGMENT TRAINING: CPT | Performed by: OCCUPATIONAL THERAPIST

## 2022-07-06 PROCEDURE — 93005 ELECTROCARDIOGRAM TRACING: CPT | Performed by: FAMILY MEDICINE

## 2022-07-06 PROCEDURE — 84100 ASSAY OF PHOSPHORUS: CPT | Performed by: FAMILY MEDICINE

## 2022-07-06 PROCEDURE — C1751 CATH, INF, PER/CENT/MIDLINE: HCPCS

## 2022-07-06 PROCEDURE — 0 LIDOCAINE 1 % SOLUTION: Performed by: NEUROLOGICAL SURGERY

## 2022-07-06 RX ORDER — LIDOCAINE HYDROCHLORIDE 10 MG/ML
5 INJECTION, SOLUTION INFILTRATION; PERINEURAL ONCE
Status: COMPLETED | OUTPATIENT
Start: 2022-07-06 | End: 2022-07-06

## 2022-07-06 RX ORDER — LABETALOL HYDROCHLORIDE 5 MG/ML
10 INJECTION, SOLUTION INTRAVENOUS EVERY 6 HOURS PRN
Status: DISCONTINUED | OUTPATIENT
Start: 2022-07-06 | End: 2022-07-11 | Stop reason: HOSPADM

## 2022-07-06 RX ADMIN — LEVETIRACETAM 500 MG: 500 TABLET, FILM COATED ORAL at 20:53

## 2022-07-06 RX ADMIN — SODIUM CHLORIDE 1.5 UNITS/HR: 9 INJECTION, SOLUTION INTRAVENOUS at 22:18

## 2022-07-06 RX ADMIN — DILTIAZEM HYDROCHLORIDE 240 MG: 240 CAPSULE, EXTENDED RELEASE ORAL at 08:33

## 2022-07-06 RX ADMIN — SODIUM CHLORIDE 5 MG/HR: 9 INJECTION, SOLUTION INTRAVENOUS at 13:09

## 2022-07-06 RX ADMIN — OXYCODONE AND ACETAMINOPHEN 1 TABLET: 325; 10 TABLET ORAL at 20:53

## 2022-07-06 RX ADMIN — ATORVASTATIN CALCIUM 20 MG: 10 TABLET, FILM COATED ORAL at 20:53

## 2022-07-06 RX ADMIN — LEVETIRACETAM 500 MG: 500 TABLET, FILM COATED ORAL at 08:33

## 2022-07-06 RX ADMIN — CEFEPIME HYDROCHLORIDE 2 G: 2 INJECTION, POWDER, FOR SOLUTION INTRAVENOUS at 21:00

## 2022-07-06 RX ADMIN — OXYCODONE HYDROCHLORIDE AND ACETAMINOPHEN 1 TABLET: 7.5; 325 TABLET ORAL at 07:34

## 2022-07-06 RX ADMIN — GABAPENTIN 300 MG: 300 CAPSULE ORAL at 16:19

## 2022-07-06 RX ADMIN — LISINOPRIL 40 MG: 20 TABLET ORAL at 08:33

## 2022-07-06 RX ADMIN — CEFEPIME HYDROCHLORIDE 2 G: 2 INJECTION, POWDER, FOR SOLUTION INTRAVENOUS at 13:14

## 2022-07-06 RX ADMIN — Medication 10 ML: at 08:36

## 2022-07-06 RX ADMIN — DOCUSATE SODIUM 50 MG AND SENNOSIDES 8.6 MG 2 TABLET: 8.6; 5 TABLET, FILM COATED ORAL at 20:53

## 2022-07-06 RX ADMIN — DOCUSATE SODIUM 50 MG AND SENNOSIDES 8.6 MG 2 TABLET: 8.6; 5 TABLET, FILM COATED ORAL at 08:33

## 2022-07-06 RX ADMIN — SODIUM CHLORIDE 7.5 MG/HR: 9 INJECTION, SOLUTION INTRAVENOUS at 21:52

## 2022-07-06 RX ADMIN — GABAPENTIN 300 MG: 300 CAPSULE ORAL at 08:33

## 2022-07-06 RX ADMIN — GABAPENTIN 300 MG: 300 CAPSULE ORAL at 20:53

## 2022-07-06 RX ADMIN — LIDOCAINE HYDROCHLORIDE 5 ML: 10 INJECTION, SOLUTION INFILTRATION; PERINEURAL at 10:01

## 2022-07-06 RX ADMIN — OXYCODONE HYDROCHLORIDE AND ACETAMINOPHEN 1 TABLET: 7.5; 325 TABLET ORAL at 00:09

## 2022-07-06 RX ADMIN — OXYCODONE HYDROCHLORIDE AND ACETAMINOPHEN 1 TABLET: 7.5; 325 TABLET ORAL at 15:24

## 2022-07-06 RX ADMIN — SODIUM CHLORIDE 15 MG/HR: 9 INJECTION, SOLUTION INTRAVENOUS at 20:12

## 2022-07-06 RX ADMIN — SODIUM CHLORIDE 7.5 MG/HR: 9 INJECTION, SOLUTION INTRAVENOUS at 16:22

## 2022-07-06 RX ADMIN — FAMOTIDINE 40 MG: 20 TABLET, FILM COATED ORAL at 08:33

## 2022-07-06 RX ADMIN — CEFEPIME HYDROCHLORIDE 2 G: 2 INJECTION, POWDER, FOR SOLUTION INTRAVENOUS at 05:41

## 2022-07-06 NOTE — PROGRESS NOTES
Daily Progress Note    ASSESSMENT:   Elijah Escobar is a 66 y.o. male with a significant medical history of recent craniotomy for tumor, 6/16/2022, coronary artery disease, A. fib, diabetes, erectile dysfunction, GERD, hypertension, hyperlipidemia. He presents with a new problem of a persistent right sided headache, particularly over the right temporal region and dizziness with standing.  Physical exam findings of tense but fluctuant area of edema from the mid frontal to right parietal region of the scalp, the area is tender to palpation, periorbital lesions with purulent drainage from the left eye, scalp incision is clean, dry, and intact, bright and awake, oriented x3, speech is clear, follows commands without prompting showing thumbs up and 2 fingers bilaterally, no pronator drift, left dysmetria, gross hyporeflexia, and bilateral Babinski.  CT of the head from 6/23/2022, showing expected postsurgical changes to the right frontal lobe, small amount of fluid collection and pneumocephalus in the right subdural space, small amount of vasogenic edema with 4.3 mm left-to-right midline shift.  Repeat CT of the head obtained 6/30/2022 shows no acute intracranial blood products, unresolved vasogenic edema, midline shift resolved, increased left frontal and temporal soft tissue swelling.  MRI of the brain from 6/30/2022 shows a 7.9 x 6.5 x 0.9 cm rim-enhancing fluid collection overlying the bone flap concerning for pseudomeningocele versus infection.    Past Medical History:   Diagnosis Date   • Brain tumor (HCC)    • Coronary artery disease    • COVID-19 vaccine series completed     MODERNA X 3; LAST DOSE 3/2022   • Diabetes (HCC)    • Erectile dysfunction    • GERD (gastroesophageal reflux disease)    • Hypercholesteremia    • Hypertension      Active Hospital Problems    Diagnosis    • Post-operative infection    • S/P craniotomy      Added automatically from request for surgery [id number]     • Paroxysmal atrial  "fibrillation with rapid ventricular response (HCC)    • Coronary artery disease    • Type 2 diabetes mellitus with hyperglycemia and peripheral neuropathy, with long-term current use of insulin (HCC)    • Hypertension    • GERD (gastroesophageal reflux disease)      CHIEF COMPLAINT:  Right sided headache and intermittent dizziness     PLAN:   Neuro: Stable.  Neuro at baseline at present.  Bright awake.  Oriented x3   5 Days Post-Op (7/1/2022) I&D and washout and craniectomy   Wound culture positive for Serratia   Continue [facility]   Keep ICU for close neurologic monitoring and insulin drip      CV: Continuous cardiac monitoring.  PICC placed to left upper extremity.   Pulm: Continuous pulse oximetry.  O2.  Maintaining O2 sat.  : Voiding.  Bladder scans and I/O cath per policy.  Trace leuks, 6-12 WBCs.  Urine culture pending.  FEN: Tolerating carb consistent diet.    GI: No acute issues  ENDO: Insulin drip for tight glucose control.  Accu-Cheks per policy.   Diabetic educator consulted.  ID: [id number].  Wound culture positive for Serratia.  Continue cefepime per ID.  Weekly CRP and ESR.  CRP 5.93.  ESR 80.  Heme:  DVT prophylaxis, SCDs  Pain: Tolerable at present  Dispo: PT/OT.  Strict use of cranial helmet while out of bed     Subjective  Symptom stable.  Doing well    Temp:  [97.8 °F (36.6 °C)-98 °F (36.7 °C)] 97.8 °F (36.6 °C)  Heart Rate:  [] 67  Resp:  [15-18] 16  BP: (129-155)/() 151/76    Objective:  General Appearance:  Well-appearing and in no acute distress.    Vital signs: (most recent): Blood pressure 151/76, pulse 67, temperature 97.8 °F (36.6 °C), temperature source Oral, resp. rate 16, height 177.8 cm (70\"), weight 118 kg (260 lb), SpO2 98 %.      Neurologic Exam     Mental Status   Oriented to person, place, and time.   Attention: normal. Concentration: normal.   Speech: speech is normal   Level of consciousness: alert    Bright and awake.  Oriented x3.  Follows commands without prompting " showing thumbs up and 2 fingers bilaterally.     Cranial Nerves     CN II   Visual fields full to confrontation.     CN III, IV, VI   Pupils are equal, round, and reactive to light.  Extraocular motions are normal.     CN V   Facial sensation intact.     CN VII   Facial expression full, symmetric.     CN VIII   CN VIII normal.     CN IX, X   CN IX normal.     CN XI   CN XI normal.     Motor Exam   Right arm tone: normal  Left arm tone: normal  Right leg tone: normal  Left leg tone: normal    Strength   Right deltoid: 5/5  Left deltoid: 5/5  Right biceps: 5/5  Left biceps: 5/5  Right triceps: 5/5  Left triceps: 5/5  Right wrist extension: 5/5  Left wrist extension: 5/5  Right iliopsoas: 5/5  Left iliopsoas: 5/5  Right quadriceps: 5/5  Left quadriceps: 5/5  Right anterior tibial: 5/5  Left anterior tibial: 5/5  Right gastroc: 5/5  Left gastroc: 5/5  Right EHL 5/5  Left EHL 5/5       Sensory Exam   Right arm light touch: normal  Left arm light touch: normal  Right leg light touch: normal  Left leg light touch: normal    Gait, Coordination, and Reflexes     Tremor   Resting tremor: absent  Intention tremor: absent  Action tremor: absent    DRAINS:   [REMOVED] Closed/Suction Drain 1 Scalp Bulb (Removed)   Site Description Unable to view 06/19/22 0735   Dressing Status Clean;Dry;Intact 06/19/22 0735   Drainage Appearance Serosanguineous 06/19/22 0735   Status To bulb suction 06/19/22 0735   Output (mL) 5 mL 06/19/22 0723       [REMOVED] Urethral Catheter Non-latex;Silicone 16 Fr. (Removed)   Daily Indications Selected surgeries ( tract, abdomen) 06/16/22 1339   Site Assessment Clean;Skin intact 06/16/22 1339   Collection Container Standard drainage bag 06/16/22 1339   Securement Method Securing device 06/16/22 1339   Output (mL) 1100 mL 06/16/22 1233     LAB RESULTS:  ABG:     CBC:   Results from last 7 days   Lab Units 07/06/22  0740 07/05/22  0754 07/03/22  0324 07/02/22  0546 07/01/22  0556 06/30/22  1225   WBC  10*3/mm3 8.48 11.26* 10.67 13.63* 10.28 14.28*   HEMOGLOBIN g/dL 10.3* 10.8* 10.5* 10.8* 9.4* 10.3*   HEMATOCRIT % 32.5* 31.7* 32.5* 33.0* 30.5* 31.2*   PLATELETS 10*3/mm3 261 278 266 314 280 282     BMP:  Results from last 7 days   Lab Units 07/06/22  0740 07/05/22  0754 07/03/22  0324 07/02/22  0546 07/01/22  0556 06/30/22  1225   SODIUM mmol/L 138 136 137 135* 141 139   POTASSIUM mmol/L 3.8 4.8 4.3 4.4 3.9 4.5   CHLORIDE mmol/L 103 101 102 99 106 105   CO2 mmol/L 26.0 22.0 28.0 22.0 28.0 25.0   BUN mg/dL 9 11 12 8 8 8   CREATININE mg/dL 0.77 1.39* 0.86 0.71* 0.77 0.68*   GLUCOSE mg/dL 102* 226* 182* 228* 140* 202*   CALCIUM mg/dL 8.7 8.6 8.1* 8.4* 8.8 8.8   ALT (SGPT) U/L 16 18  --   --  37 45*     Culture Results:   Blood Culture   Date Value Ref Range Status   06/30/2022 No growth at 4 days  Preliminary   06/30/2022 No growth at 4 days  Preliminary     Urine Culture   Date Value Ref Range Status   06/30/2022 <10,000 CFU/mL Gram Positive Cocci (A)  Final     Comment:     Call if further workup needed.       Wound Culture   Date Value Ref Range Status   07/01/2022 Scant growth (1+) Serratia marcescens (A)  Final     Imaging Results (Last 24 Hours)     ** No results found for the last 24 hours. **        Lab Results (last 24 hours)     Procedure Component Value Units Date/Time    Comprehensive Metabolic Panel [[id number]]  (Abnormal) Collected: 07/06/22 0740    Specimen: Blood Updated: 07/06/22 0855     Glucose 102 mg/dL      BUN 9 mg/dL      Creatinine 0.77 mg/dL      Sodium 138 mmol/L      Potassium 3.8 mmol/L      Chloride 103 mmol/L      CO2 26.0 mmol/L      Calcium 8.7 mg/dL      Total Protein 6.7 g/dL      Albumin 2.90 g/dL      ALT (SGPT) 16 U/L      AST (SGOT) 11 U/L      Alkaline Phosphatase 100 U/L      Total Bilirubin 0.5 mg/dL      Globulin 3.8 gm/dL      A/G Ratio 0.8 g/dL      BUN/Creatinine Ratio 11.7     Anion Gap 9.0 mmol/L      eGFR 98.7 mL/min/1.73      Comment: National Kid[employer] Foundation and  American Society of Nephrology (ASN) Task Force recommended calculation based on the Chronic Kidney Disease Epidemiology Collaboration (CKD-EPI) equation refit without adjustment for race.       Narrative:      GFR Normal >60  Chronic Kidney Disease <60  Kidney Failure <15      CBC & Differential [[id number]]  (Abnormal) Collected: 07/06/22 0740    Specimen: Blood Updated: 07/06/22 0846    Narrative:      The following orders were created for panel order CBC & Differential.  Procedure                               Abnormality         Status                     ---------                               -----------         ------                     CBC Auto Differential[[id number]]        Abnormal            Final result                 Please view results for these tests on the individual orders.    CBC Auto Differential [[id number]]  (Abnormal) Collected: 07/06/22 0740    Specimen: Blood Updated: 07/06/22 0846     WBC 8.48 10*3/mm3      RBC 3.37 10*6/mm3      Hemoglobin 10.3 g/dL      Hematocrit 32.5 %      MCV 96.4 fL      MCH 30.6 pg      MCHC 31.7 g/dL      RDW 12.3 %      RDW-SD 43.2 fl      MPV 9.6 fL      Platelets 261 10*3/mm3      Neutrophil % 66.1 %      Lymphocyte % 25.5 %      Monocyte % 6.8 %      Eosinophil % 0.8 %      Basophil % 0.4 %      Immature Grans % 0.4 %      Neutrophils, Absolute 5.61 10*3/mm3      Lymphocytes, Absolute 2.16 10*3/mm3      Monocytes, Absolute 0.58 10*3/mm3      Eosinophils, Absolute 0.07 10*3/mm3      Basophils, Absolute 0.03 10*3/mm3      Immature Grans, Absolute 0.03 10*3/mm3      nRBC 0.0 /100 WBC     POC Glucose Once [[id number]]  (Normal) Collected: 07/06/22 0720    Specimen: Blood Updated: 07/06/22 0741     Glucose 92 mg/dL      Comment: : [id number] Garry Skinner ID: [id number]       POC Glucose Once [[id number]]  (Normal) Collected: 07/06/22 0542    Specimen: Blood Updated: 07/06/22 0603     Glucose 120 mg/dL      Comment: : [id number] Tyrone Albert  ID: [id number]       Anaerobic Culture - Tissue, Cranium [[id number]] Collected: 07/01/22 1759    Specimen: Tissue from Cranium Updated: 07/06/22 0544     Anaerobic Culture No anaerobes isolated at 5 days    POC Glucose Once [[id number]]  (Normal) Collected: 07/06/22 0333    Specimen: Blood Updated: 07/06/22 0346     Glucose 129 mg/dL      Comment: : [id number] Mk Norris ID: [id number]       POC Glucose Once [[id number]]  (Normal) Collected: 07/06/22 0125    Specimen: Blood Updated: 07/06/22 0146     Glucose 122 mg/dL      Comment: : [id number] Tyrone Mckinley TMeter ID: [id number]       POC Glucose Once [[id number]]  (Normal) Collected: 07/05/22 2315    Specimen: Blood Updated: 07/05/22 2337     Glucose 101 mg/dL      Comment: : [id number] Tyrone Mckinley TMeter ID: [id number]       Blood Culture - Blood, Arm, Left [[id number]]  (Normal) Collected: 06/30/22 2026    Specimen: Blood from Arm, Left Updated: 07/05/22 2232     Blood Culture No growth at 5 days    Blood Culture - Blood, Arm, Left [[id number]]  (Normal) Collected: 06/30/22 2030    Specimen: Blood from Arm, Left Updated: 07/05/22 2232     Blood Culture No growth at 5 days    POC Glucose Once [[id number]]  (Normal) Collected: 07/05/22 2113    Specimen: Blood Updated: 07/05/22 2134     Glucose 100 mg/dL      Comment: : [id number] Tyrone Murdockyazminanirudh TMeter ID: [id number]       POC Glucose Once [[id number]]  (Normal) Collected: 07/05/22 2010    Specimen: Blood Updated: 07/05/22 2031     Glucose 105 mg/dL      Comment: : [id number] Tyrone Mckinley TMeter ID: [id number]       POC Glucose Once [[id number]]  (Normal) Collected: 07/05/22 1910    Specimen: Blood Updated: 07/05/22 1921     Glucose 110 mg/dL      Comment: : [id number] Damon Rodrigues ID: [id number]       Urinalysis, Microscopic Only - Urine, Catheter In/Out [[id number]]  (Abnormal) Collected: 07/05/22 1846    Specimen: Urine, Catheter In/Out Updated: 07/05/22 1904     RBC, UA  0-2 /HPF      WBC, UA 6-12 /HPF      Bacteria, UA None Seen /HPF      Squamous Epithelial Cells, UA None Seen /HPF      Hyaline Casts, UA None Seen /LPF      Methodology Automated Microscopy    Urinalysis With Culture If Indicated - Urine, Catheter In/Out [[id number]]  (Abnormal) Collected: 07/05/22 1846    Specimen: Urine, Catheter In/Out Updated: 07/05/22 1904     Color, UA Yellow     Appearance, UA Clear     pH, UA 7.0     Specific Gravity, UA 1.019     Glucose,  mg/dL (Trace)     Ketones, UA 15 mg/dL (1+)     Bilirubin, UA Negative     Blood, UA Negative     Protein, UA 30 mg/dL (1+)     Leuk Esterase, UA Trace     Nitrite, UA Negative     Urobilinogen, UA 0.2 E.U./dL    Narrative:      In absence of clinical symptoms, the presence of pyuria, bacteria, and/or nitrites on the urinalysis result does not correlate with infection.    Urine Culture - Urine, Urine, Catheter In/Out [[id number]] Collected: 07/05/22 1846    Specimen: Urine, Catheter In/Out Updated: 07/05/22 1904    POC Glucose Once [[id number]]  (Normal) Collected: 07/05/22 1754    Specimen: Blood Updated: 07/05/22 1817     Glucose 130 mg/dL      Comment: : [id number] Garry Skinner ID: [id number]       POC Glucose Once [[id number]]  (Abnormal) Collected: 07/05/22 1712    Specimen: Blood Updated: 07/05/22 1724     Glucose 147 mg/dL      Comment: : [id number] Damon Guaman AMeter ID: [id number]       POC Glucose Once [[id number]]  (Normal) Collected: 07/05/22 1610    Specimen: Blood Updated: 07/05/22 1622     Glucose 127 mg/dL      Comment: : [id number] Damon Rowena AMeter ID: [id number]       POC Glucose Once [[id number]]  (Abnormal) Collected: 07/05/22 1513    Specimen: Blood Updated: 07/05/22 1524     Glucose 140 mg/dL      Comment: : [id number] Damon Guaman AMeter ID: [id number]       Sedimentation Rate [[id number]]  (Abnormal) Collected: 07/05/22 1409    Specimen: Blood Updated: 07/05/22 1428     Sed Rate 80 mm/hr     POC  Glucose Once [[id number]]  (Abnormal) Collected: 07/05/22 1412    Specimen: Blood Updated: 07/05/22 1423     Glucose 160 mg/dL      Comment: : [id number] Damon Guaman AMeter ID: [id number]       POC Glucose Once [[id number]]  (Abnormal) Collected: 07/05/22 1312    Specimen: Blood Updated: 07/05/22 1323     Glucose 171 mg/dL      Comment: : [id number] Damon Mchughhanie AMeter ID: [id number]       POC Glucose Once [[id number]]  (Abnormal) Collected: 07/05/22 1212    Specimen: Blood Updated: 07/05/22 1222     Glucose 182 mg/dL      Comment: : [id number] Damon Mchughhanie AMeter ID: [id number]       C-reactive Protein [[id number]]  (Abnormal) Collected: 07/05/22 0754    Specimen: Blood Updated: 07/05/22 1016     C-Reactive Protein 5.93 mg/dL         Rahul Arnold APRN

## 2022-07-06 NOTE — PLAN OF CARE
Goal Outcome Evaluation:  Plan of Care Reviewed With: patient           Outcome Evaluation: OT tx complete. Pt agreeable to therapy. Pt completed bed mobility with min A. Pt sat EOB with no balance deficits noted, and helmet was donned for and explained to pt. Pt able to adjust socks sitting EOB to decrease fall hazard with SBA for balance. Pt completed sit<>stand with CGA. Pt perfromed fxl mobility half way around unit with CGA-min A with unsteadiness, but no LOB. Pt required several standing rest breaks. When in static stand, pt shifts body weight to heels demos posterior lean with anterior pelvic tilt in attempt to maintain balance. Pt educated on weight distribution to balls of feet when in standing and maintaining neutral posture. Pt would attempt to shift weight and maintain proper posture each time, however quickly shifted weight back to heels. Continue OT POC to increase balance and endurance for safety in ADLs and fxl mobility.

## 2022-07-06 NOTE — THERAPY TREATMENT NOTE
Acute Care - Physical Therapy Treatment Note  [facility]     Patient Name: Elijah Escobar  : 1955  MRN: [id number]  Today's Date: 2022      Visit Dx:     ICD-10-CM ICD-9-CM   1. Postoperative infection, unspecified type, initial encounter  T81.40XA 998.59   2. S/P craniotomy  Z98.890 V45.89   3. Impaired mobility and ADLs  Z74.09 V49.89    Z78.9    4. Impaired mobility  Z74.09 799.89     Patient Active Problem List   Diagnosis   • Meningioma s/p craniotomy   • Hypertension   • GERD (gastroesophageal reflux disease)   • Coronary artery disease   • Morbid obesity (HCC)   • Type 2 diabetes mellitus with hyperglycemia and peripheral neuropathy, with long-term current use of insulin (HCC)   • Leukocytosis   • Other specified anemias   • Paroxysmal atrial fibrillation with rapid ventricular response (HCC)   • Atrial fibrillation with RVR (HCC)   • Post-operative infection   • S/P craniotomy     Past Medical History:   Diagnosis Date   • Brain tumor (HCC)    • Coronary artery disease    • COVID-19 vaccine series completed     MODERNA X 3; LAST DOSE 3/2022   • Diabetes (HCC)    • Erectile dysfunction    • GERD (gastroesophageal reflux disease)    • Hypercholesteremia    • Hypertension      Past Surgical History:   Procedure Laterality Date   • BALLOON ANGIOPLASTY, ARTERY Right    • COLONOSCOPY     • CRANIECTOMY Right 2022    Procedure: CRANIECTOMY WITH INCISIONAL WASHOUT;  Surgeon: Felipe Banerjee MD;  Location: [facility] OR;  Service: Neurosurgery;  Laterality: Right;   • CRANIOTOMY FOR TUMOR Right 2022    Procedure: CRANIOTOMY FOR TUMOR STERIOTACTIC WITH BRAIN LAB, right;  Surgeon: Flaco Portillo MD;  Location: [facility] OR;  Service: Neurosurgery;  Laterality: Right;     PT Assessment (last 12 hours)     PT Evaluation and Treatment     Row Name 22 1104          Physical Therapy Time and Intention    Subjective Information no complaints  -CHRISTIANO     Document Type therapy note (daily note)   "-CHRISTIANO     Mode of Treatment physical therapy  -CHRISTIANO     Comment pt frequently repeated himself.  He would also state \" what are we doing\" multiple times and that he forgot.  -CHRISTIANO     Row Name 07/06/22 1104          General Information    Existing Precautions/Restrictions fall  helmet  -CHRISTIANO     Row Name 07/06/22 1104          Pain    Pretreatment Pain Rating 4/10  -CHRISTIANO     Posttreatment Pain Rating 4/10  -CHRISTIANO     Pain Location - Side/Orientation Bilateral  -CHRISTIANO     Pain Location - foot  -CHRISTIANO     Pre/Posttreatment Pain Comment pt stated he has constant foot pain from neuropathy  -CHRISTIANO     Pain Intervention(s) Repositioned  -CHRISTIANO     Row Name 07/06/22 1104          Bed Mobility    Supine-Sit [place] (Bed Mobility) verbal cues;minimum assist (75% patient effort)  -CHRISTIANO     Sit-Supine [place] (Bed Mobility) verbal cues;standby assist  -CHRISTIANO     Row Name 07/06/22 1104          Transfers    Sit-Stand [place] (Transfers) verbal cues;contact guard  -CHRISTIANO     Stand-Sit [place] (Transfers) contact guard  -CHRISTIANO     Row Name 07/06/22 1104          Gait/Stairs (Locomotion)    [place] Level (Gait) verbal cues;contact guard  -CHRISTIANO     Assistive Device (Gait) cane, straight  -CHRISTIANO     Distance in Feet (Gait) 250 with 3 standing rests  -CHRISTIANO     Deviations/Abnormal Patterns (Gait) gait speed decreased  -CHRISTIANO     Bilateral Gait Deviations forward flexed posture  -CHRISTIANO     Row Name             Wound 06/16/22 0951 Right anterior scalp Incision    Wound - Properties Group Placement Date: 06/16/22  -JBA Placement Time: 0951 -JBA Side: Right  -JBA Orientation: anterior  -JBA Location: scalp  -JBA Primary Wound Type: Incision  -JBA     Retired Wound - Properties Group Placement Date: 06/16/22  -JBA Placement Time: 0951 -JBA Side: Right  -JBA Orientation: anterior  -JBA Location: scalp  -JBA Primary Wound Type: Incision  -JBA     Retired Wound - Properties Group Date first assessed: 06/16/22  -JBA Time first assessed: 0951  -JBA Side: Right  -JBA " Location: scalp  -JBA Primary Wound Type: Incision  -JBA     Row Name 07/06/22 1104          Positioning and Restraints    Pre-Treatment Position in bed  -CHRISTIANO     Post Treatment Position bed  -CHRISTIANO     In Bed supine;call light within reach;encouraged to call for assist;with nsg;side rails up x2;SCD pump applied  -CHRISTIANO           User Key  (r) = Recorded By, (t) = Taken By, (c) = Cosigned By    Initials Name Provider Type    Александр Morley PTA Physical Therapist Assistant    Ricky Watts RN Registered Nurse                Physical Therapy Education                 Title: PT OT SLP Therapies (In Progress)     Topic: Physical Therapy (In Progress)     Point: Mobility training (In Progress)     Learning Progress Summary           Patient Acceptance, E, NR by CHRISTIANO at 7/6/2022 1104    Comment: safety with transfers and amb, wearing helmet when up    Acceptance, E,TB, VU,NR by  at 7/5/2022 2250    Comment: connie called on the phone and also received education.    Acceptance, E, VU by  at 7/4/2022 1152    Comment: safety, POC   Family Acceptance, E,TB, VU,NR by  at 7/5/2022 2250    Comment: connie called on the phone and also received education.                   Point: Home exercise program (Done)     Learning Progress Summary           Patient Acceptance, E,TB, VU,NR by  at 7/5/2022 2250    Comment: connie called on the phone and also received education.   Family Acceptance, E,TB, VU,NR by  at 7/5/2022 2250    Comment: neadrián called on the phone and also received education.                   Point: Body mechanics (Done)     Learning Progress Summary           Patient Acceptance, E,TB, VU,NR by SF at 7/5/2022 2250    Comment: neadrián called on the phone and also received education.   Family Acceptance, E,TB, VU,NR by SF at 7/5/2022 2250    Comment: neadrián called on the phone and also received education.                   Point: Precautions (Done)     Learning Progress Summary           Patient Acceptance, E,TB,  VU,NR by  at 7/5/2022 2250    Comment: connie called on the phone and also received education.   Family Acceptance, E,TB, VU,NR by  at 7/5/2022 2250    Comment: connie called on the phone and also received education.                               User Key     Initials Effective Dates Name Provider Type Discipline    KR 06/16/21 -  Prabha Singh, PT DPT Physical Therapist PT    CHRISTIANO 12/08/16 -  Александр Saravia, KYLAH Physical Therapist Assistant PT     06/16/21 -  Arlen Hansen RN Registered Nurse Nurse              PT Recommendation and Plan     Plan of Care Reviewed With: patient  Progress: improving  Outcome Evaluation: Pt was able to perform supine to sitting with min assist.  Transfered sit to stand with CGA. Amb 250' with straight cane and CGA/min assist.  Pt had one LOB to the right that required min assist to correct.    Pt would frequently repeat himself, also he would forget what we were doing during the tx.  Pt may benefit from a speech eval to assist with cognitive, memory, and safety.   Outcome Measures     Row Name 07/06/22 1104             How much help from another person do you currently need...    Turning from your back to your side while in flat bed without using bedrails? 3  -CHRISTIANO      Moving from lying on back to sitting on the side of a flat bed without bedrails? 3  -CHRISTIANO      Moving to and from a bed to a chair (including a wheelchair)? 3  -CHRISTIANO      Standing up from a chair using your arms (e.g., wheelchair, bedside chair)? 3  -CHRISTIANO      Climbing 3-5 steps with a railing? 2  -CHRISTIANO      To walk in hospital room? 3  -CHRISTIANO      AM-PAC 6 Clicks Score (PT) 17  -CHRISTIANO              Functional Assessment    Outcome Measure Options AM-PAC 6 Clicks Basic Mobility (PT)  -CHRISTIANO            User Key  (r) = Recorded By, (t) = Taken By, (c) = Cosigned By    Initials Name Provider Type    Александр Morley PTA Physical Therapist Assistant                 Time Calculation:    PT Charges     Row Name 07/06/22  1104             Time Calculation    Start Time 1104  -CHRISTIANO      Stop Time 1135  -CHRISTIANO      Time Calculation (min) 31 min  -CHRISTIANO      PT Received On 07/06/22  -CHRISTIANO              Time Calculation- PT    Total Timed Code Minutes- PT 31 minute(s)  -CHRISTIANO              Timed Charges    69624 - Gait Training Minutes  31  -CHRISTIANO              Total Minutes    Timed Charges Total Minutes 31  -CHRISTIANO       Total Minutes 31  -CHRISTIANO            User Key  (r) = Recorded By, (t) = Taken By, (c) = Cosigned By    Initials Name Provider Type    Александр Morley PTA Physical Therapist Assistant              Therapy Charges for Today     Code Description Service Date Service Provider Modifiers Qty    11932910530 HC GAIT TRAINING EA 15 MIN 7/6/2022 Александр Saravia PTA GP 2          PT G-Codes  Outcome Measure Options: AM-PAC 6 Clicks Basic Mobility (PT)  AM-PAC 6 Clicks Score (PT): 17  AM-PAC 6 Clicks Score (OT): (P) 16    Александр Saravia PTA  7/6/2022

## 2022-07-06 NOTE — THERAPY TREATMENT NOTE
Patient Name: Elijah Escobar  : 1955    MRN: [id number]                              Today's Date: 2022       Admit Date: 2022    Visit Dx:     ICD-10-CM ICD-9-CM   1. Postoperative infection, unspecified type, initial encounter  T81.40XA 998.59   2. S/P craniotomy  Z98.890 V45.89   3. Impaired mobility and ADLs  Z74.09 V49.89    Z78.9    4. Impaired mobility  Z74.09 799.89     Patient Active Problem List   Diagnosis   • Meningioma s/p craniotomy   • Hypertension   • GERD (gastroesophageal reflux disease)   • Coronary artery disease   • Morbid obesity (HCC)   • Type 2 diabetes mellitus with hyperglycemia and peripheral neuropathy, with long-term current use of insulin (HCC)   • Leukocytosis   • Other specified anemias   • Paroxysmal atrial fibrillation with rapid ventricular response (HCC)   • Atrial fibrillation with RVR (HCC)   • Post-operative infection   • S/P craniotomy     Past Medical History:   Diagnosis Date   • Brain tumor (HCC)    • Coronary artery disease    • COVID-19 vaccine series completed     MODERNA X 3; LAST DOSE 3/2022   • Diabetes (HCC)    • Erectile dysfunction    • GERD (gastroesophageal reflux disease)    • Hypercholesteremia    • Hypertension      Past Surgical History:   Procedure Laterality Date   • BALLOON ANGIOPLASTY, ARTERY Right    • COLONOSCOPY     • CRANIECTOMY Right 2022    Procedure: CRANIECTOMY WITH INCISIONAL WASHOUT;  Surgeon: Felipe Banerjee MD;  Location:  [facility] OR;  Service: Neurosurgery;  Laterality: Right;   • CRANIOTOMY FOR TUMOR Right 2022    Procedure: CRANIOTOMY FOR TUMOR STERIOTACTIC WITH BRAIN LAB, right;  Surgeon: Flaco Portillo MD;  Location:  [facility] OR;  Service: Neurosurgery;  Laterality: Right;      General Information     Row Name 22 1002          OT Time and Intention    Document Type therapy note (daily note)  -MENDEL (r) DL (t) MENDEL (c)     Mode of Treatment occupational therapy  -MENDEL (r) DL (t) JTITI (c)     Row Name  07/06/22 1002          General Information    Patient Profile Reviewed yes  -JJ (r) AD (t) JJ (c)     Existing Precautions/Restrictions fall  -JJ (r) AD (t) JJ (c)           User Key  (r) = Recorded By, (t) = Taken By, (c) = Cosigned By    Initials Name Provider Type    Coleen Martinez, OTR/L, CSRS Occupational Therapist    Sara Davis, OT Student OT Student                 Mobility/ADL's     Row Name 07/06/22 1002          Bed Mobility    Bed Mobility supine-sit;sit-supine  -JJ (r) AD (t) JJ (c)     Supine-Sit [place] (Bed Mobility) minimum assist (75% patient effort);verbal cues  -JJ (r) AD (t) JJ (c)     Sit-Supine [place] (Bed Mobility) standby assist;verbal cues  -JJ (r) AD (t) JJ (c)     Assistive Device (Bed Mobility) head of bed elevated;bed rails  -JJ (r) AD (t) JJ (c)     Row Name 07/06/22 1002          Transfers    Transfers sit-stand transfer;stand-sit transfer  -JJ (r) AD (t) JJ (c)     Sit-Stand [place] (Transfers) contact guard  -JJ (r) AD (t) JJ (c)     Stand-Sit [place] (Transfers) contact guard  -JJ (r) AD (t) JJ (c)     Row Name 07/06/22 1002          Functional Mobility    Functional Mobility- Ind. Level minimum assist (75% patient effort)  -JJ (r) AD (t) JJ (c)     Functional Mobility- Comment Pt ambulated half way around unit to improve mobility, endance, and balance, for fxl tasks and ADLs. Pt unsteady, requesting to grab IV pole with therapist, no true LOB. Pt stated he felt more steady and was provided a straight cane at EOS. Pt with standing rest breaks throughout.  -JJ (r) AD (t) JJ (c)     Row Name 07/06/22 1002          Activities of Daily Living    BADL Assessment/Intervention lower body dressing  -JJ (r) AD (t) JJ (c)     Row Name 07/06/22 1002          Lower Body Dressing Assessment/Training    [place] Level (Lower Body Dressing) socks;standby assist  -JJ (r) AD (t) MENDEL (c)     Position (Lower Body Dressing) edge of bed sitting  -JJ (r) AD (t) JJ  (c)     Comment, (Lower Body Dressing) adjusts socks sitting EOB with vcs for pulling sock up fully. Completed with SBA  -JJ (r) AD (t) JJ (c)           User Key  (r) = Recorded By, (t) = Taken By, (c) = Cosigned By    Initials Name Provider Type    MENDEL Andres Coleen, OTR/L, CSRS Occupational Therapist    Sara Davis, OT Student OT Student               Obj/Interventions     Row Name 07/06/22 1002          Balance    Balance Assessment standing static balance;standing dynamic balance  -JJ (r) AD (t) JJ (c)     Static Standing Balance contact guard;minimal assist  -JJ (r) AD (t) JJ (c)     Dynamic Standing Balance minimal assist  -JJ (r) AD (t) JJ (c)     Position/Device Used, Standing Balance supported  -JJ (r) AD (t) JJ (c)     Comment, Balance Pt with very wide base of support initially upon standing, vcs provided and pt corrected to shoulder width base of support. Pt with posterior lean in standing, with difficulty correcting. Pt educated on weight distribution to balls of feet and would attemtp to correct momentarily, quickly reverting to standing on his heels with anterior pelvic tilt to maintain balance with posterior lean  -JJ (r) AD (t) JJ (c)           User Key  (r) = Recorded By, (t) = Taken By, (c) = Cosigned By    Initials Name Provider Type    MENDEL AndresColeen OTR/L, ALEISHA Occupational Therapist    Sara Davis, OT Student OT Student               Goals/Plan    No documentation.                Clinical Impression     Row Name 07/06/22 1002          Pain Assessment    Pretreatment Pain Rating 9/10  -JJ (r) AD (t) JJ (c)     Posttreatment Pain Rating 9/10  -JJ (r) AD (t) JJ (c)     Pain Location - Side/Orientation Bilateral  -JJ (r) AD (t) JJ (c)     Pain Location - foot  -JJ (r) AD (t) JJ (c)     Pain Intervention(s) Ambulation/increased activity  -JJ (r) AD (t) JJ (c)     Row Name 07/06/22 1002          Plan of Care Review    Plan of Care Reviewed With patient  -JJ (r) AD (t) JJ (c)      Outcome Evaluation OT tx complete. Pt agreeable to therapy. Pt completed bed mobility with min A. Pt sat EOB with no balance deficits noted, and helmet was donned for and explained to pt. Pt able to adjust socks sitting EOB to decrease fall hazard with SBA for balance. Pt completed sit<>stand with CGA. Pt perfromed fxl mobility half way around unit with CGA-min A with unsteadiness, but no LOB. Pt required several standing rest breaks. When in static stand, pt shifts body weight to heels demos posterior lean with anterior pelvic tilt in attempt to maintain balance. Pt educated on weight distribution to balls of feet when in standing and maintaining neutral posture. Pt would attempt to shift weight and maintain proper posture each time, however quickly shifted weight back to heels. Continue OT POC to increase balance and endurance for safety in ADLs and fxl mobility.  -JJ (r) AD (t) JJ (c)     Row Name 07/06/22 1002          Vital Signs    O2 Delivery Pre Treatment room air  -JJ (r) AD (t) JJ (c)     O2 Delivery Intra Treatment room air  -JJ (r) AD (t) JJ (c)     O2 Delivery Post Treatment room air  -JJ (r) AD (t) JJ (c)     Row Name 07/06/22 1002          Positioning and Restraints    Pre-Treatment Position in bed  -JJ (r) AD (t) JJ (c)     Post Treatment Position bed  -JJ (r) AD (t) JJ (c)     In Bed fowlers;call light within reach;encouraged to call for assist;exit alarm on;side rails up x3;with nsg;SCD pump applied  -JJ (r) AD (t) JJ (c)           User Key  (r) = Recorded By, (t) = Taken By, (c) = Cosigned By    Initials Name Provider Type    Coleen Martinez, OTMARCELLA/L, CSRS Occupational Therapist    Sara Davis, ERON Student OT Student               Outcome Measures     Row Name 07/06/22 1002          How much help from another is currently needed...    Putting on and taking off regular lower body clothing? 3  -JJ (r) AD (t) JJ (c)     Bathing (including washing, rinsing, and drying) 2  -JJ (r) AD (t) JJ  (c)     Toileting (which includes using toilet bed pan or urinal) 2  -JJ (r) AD (t) JJ (c)     Putting on and taking off regular upper body clothing 3  -JJ (r) AD (t) JJ (c)     Taking care of personal grooming (such as brushing teeth) 3  -JJ (r) AD (t) JJ (c)     Eating meals 3  -JJ (r) AD (t) JJ (c)     AM-PAC 6 Clicks Score (OT) 16  -JJ (r) AD (t)     Row Name 07/06/22 1104          How much help from another person do you currently need...    Turning from your back to your side while in flat bed without using bedrails? 3  -CHRISTIANO     Moving from lying on back to sitting on the side of a flat bed without bedrails? 3  -CHRISTIANO     Moving to and from a bed to a chair (including a wheelchair)? 3  -CHRISTIANO     Standing up from a chair using your arms (e.g., wheelchair, bedside chair)? 3  -CHRISTIANO     Climbing 3-5 steps with a railing? 2  -CHRISTIANO     To walk in hospital room? 3  -CHRISTIANO     AM-PAC 6 Clicks Score (PT) 17  -CHRISTIANO     Highest level of mobility 5 --> Static standing  -CHRISTIANO     Row Name 07/06/22 1104 07/06/22 1002       Functional Assessment    Outcome Measure Options AM-PAC 6 Clicks Basic Mobility (PT)  -CHRISTIANO AM-PAC 6 Clicks Daily Activity (OT)  -JJ (r) AD (t) JJ (c)          User Key  (r) = Recorded By, (t) = Taken By, (c) = Cosigned By    Initials Name Provider Type    Александр Morley, PTA Physical Therapist Assistant    Coleen Martinez, OTR/L, CSRS Occupational Therapist    Sara Davis OT Student OT Student                Occupational Therapy Education                 Title: PT OT SLP Therapies (In Progress)     Topic: Occupational Therapy (Done)     Point: ADL training (Done)     Description:   Instruct learner(s) on proper safety adaptation and remediation techniques during self care or transfers.   Instruct in proper use of assistive devices.              Learning Progress Summary           Patient Acceptance, E, VU by AD at 7/6/2022 1032    Acceptance, E,TB, VU,NR by SF at 7/5/2022 2250    Comment: connie yadav on  the phone and also received education.    Acceptance, E,D, NR by  at 7/5/2022 0942   Family Acceptance, E,TB, VU,NR by  at 7/5/2022 2250    Comment: connie called on the phone and also received education.                   Point: Home exercise program (Done)     Description:   Instruct learner(s) on appropriate technique for monitoring, assisting and/or progressing therapeutic exercises/activities.              Learning Progress Summary           Patient Acceptance, E, VU by AD at 7/6/2022 1032    Acceptance, E,TB, VU,NR by  at 7/5/2022 2250    Comment: connie called on the phone and also received education.    Acceptance, E,D, NR by  at 7/5/2022 0942   Family Acceptance, E,TB, VU,NR by  at 7/5/2022 2250    Comment: connie called on the phone and also received education.                               User Key     Initials Effective Dates Name Provider Type Discipline     06/16/21 -  Arlen Hansen RN Registered Nurse Nurse     08/28/18 -  Maya Rodriguez, OTR/L Occupational Therapist OT     05/04/22 -  Sara Florez OT Student OT Student OT              OT Recommendation and Plan     Plan of Care Review  Plan of Care Reviewed With: patient  Outcome Evaluation: OT tx complete. Pt agreeable to therapy. Pt completed bed mobility with min A. Pt sat EOB with no balance deficits noted, and helmet was donned for and explained to pt. Pt able to adjust socks sitting EOB to decrease fall hazard with SBA for balance. Pt completed sit<>stand with CGA. Pt perfromed fxl mobility half way around unit with CGA-min A with unsteadiness, but no LOB. Pt required several standing rest breaks. When in static stand, pt shifts body weight to heels demos posterior lean with anterior pelvic tilt in attempt to maintain balance. Pt educated on weight distribution to balls of feet when in standing and maintaining neutral posture. Pt would attempt to shift weight and maintain proper posture each time, however quickly shifted  weight back to heels. Continue OT POC to increase balance and endurance for safety in ADLs and fxl mobility.     Time Calculation:    Time Calculation- OT     Row Name 07/06/22 1104 07/06/22 1002          Time Calculation- OT    OT Start Time -- 0921  -JJ (r) AD (t) JJ (c)     OT Stop Time -- 1002  -JJ (r) AD (t) JJ (c)     OT Time Calculation (min) -- 41 min  -JJ (r) AD (t)     Total Timed Code Minutes- OT -- 41 minute(s)  -JJ (r) AD (t) JJ (c)     OT Received On -- 07/06/22  -JJ (r) AD (t) JJ (c)            Timed Charges    28562 - Gait Training Minutes  31  -CHRISTIANO --     50848 - OT Self Care/Mgmt Minutes -- 41  -JJ (r) AD (t) JJ (c)            Total Minutes    Timed Charges Total Minutes 31  -CHRISTIANO 41  -JJ (r) AD (t)      Total Minutes 31  -CHRISTIANO 41  -JJ (r) AD (t)           User Key  (r) = Recorded By, (t) = Taken By, (c) = Cosigned By    Initials Name Provider Type    Александр Morley, PTA Physical Therapist Assistant    Coleen Martinez, PETER/L, CSRS Occupational Therapist    Sara Davis OT Student OT Student                       Sara Florez OT Student  7/6/2022

## 2022-07-06 NOTE — PROGRESS NOTES
"Infectious Diseases Progress Note    Patient:  Elijah Escobar  YOB: 1955  MRN: [id number]   Admit date: 6/30/2022   Admitting Physician: Flaco Portillo, *  Primary Care Physician: Dylan Lama APRN    Chief Complaint/Interval History: He indicates he is doing well.  No new symptoms.  No fevers or chills.  Headache controlled with pain medication treatment.    Intake/Output Summary (Last 24 hours) at 7/6/2022 1510  Last data filed at 7/6/2022 1200  Gross per 24 hour   Intake 110.72 ml   Output 625 ml   Net -514.28 ml     Allergies: No Known Allergies  Current Scheduled Medications:   atorvastatin, 20 mg, Oral, Nightly  cefepime, 2 g, Intravenous, Q8H  dilTIAZem CD, 240 mg, Oral, Q24H  famotidine, 40 mg, Oral, Daily  gabapentin, 300 mg, Oral, TID  levETIRAcetam, 500 mg, Oral, BID  lisinopril, 40 mg, Oral, Daily  senna-docusate sodium, 2 tablet, Oral, BID  sodium chloride, 10 mL, Intravenous, Q12H  sodium chloride, 10 mL, Intravenous, Q12H  sotalol, 120 mg, Oral, BID      Current PRN Medications:  •  acetaminophen **OR** acetaminophen **OR** acetaminophen  •  senna-docusate sodium **AND** polyethylene glycol **AND** bisacodyl **AND** bisacodyl  •  dextrose  •  dextrose  •  glucagon (human recombinant)  •  labetalol  •  ondansetron **OR** ondansetron  •  oxyCODONE-acetaminophen  •  oxyCODONE-acetaminophen  •  sodium chloride  •  sodium chloride    Review of Systems see HPI.  No diarrhea or rash.    Vital Signs:  BP (!) 186/80   Pulse 68   Temp 98.2 °F (36.8 °C) (Oral)   Resp 16   Ht 177.8 cm (70\")   Wt 118 kg (260 lb)   SpO2 100%   BMI 37.31 kg/m²     Physical Exam  Vital signs - reviewed.  Line/IV site - No erythema, warmth, induration, or tenderness.  He is awake, pleasant, interactive, and in no distress  He looks comfortable at present  Neurological examination nonfocal    Lab Results:  CBC:   Results from last 7 days   Lab Units 07/06/22  0740 07/05/22  0754 07/03/22  0324 " 07/02/22  0546 07/01/22  0556 06/30/22  1225   WBC 10*3/mm3 8.48 11.26* 10.67 13.63* 10.28 14.28*   HEMOGLOBIN g/dL 10.3* 10.8* 10.5* 10.8* 9.4* 10.3*   HEMATOCRIT % 32.5* 31.7* 32.5* 33.0* 30.5* 31.2*   PLATELETS 10*3/mm3 261 278 266 314 280 282     BMP:  Results from last 7 days   Lab Units 07/06/22  0740 07/05/22  0754 07/03/22  0324 07/02/22  0546 07/01/22  0556 06/30/22  1225   SODIUM mmol/L 138 136 137 135* 141 139   POTASSIUM mmol/L 3.8 4.8 4.3 4.4 3.9 4.5   CHLORIDE mmol/L 103 101 102 99 106 105   CO2 mmol/L 26.0 22.0 28.0 22.0 28.0 25.0   BUN mg/dL 9 11 12 8 8 8   CREATININE mg/dL 0.77 1.39* 0.86 0.71* 0.77 0.68*   GLUCOSE mg/dL 102* 226* 182* 228* 140* 202*   CALCIUM mg/dL 8.7 8.6 8.1* 8.4* 8.8 8.8   ALT (SGPT) U/L 16 18  --   --  37 45*     Culture Results:   Blood Culture   Date Value Ref Range Status   06/30/2022 No growth at 5 days  Final   06/30/2022 No growth at 5 days  Final     Urine Culture   Date Value Ref Range Status   07/05/2022 No growth  Preliminary   06/30/2022 <10,000 CFU/mL Gram Positive Cocci (A)  Final     Comment:     Call if further workup needed.       Wound Culture   Date Value Ref Range Status   07/01/2022 Scant growth (1+) Serratia marcescens (A)  Final     Radiology: None  Additional Studies Reviewed: None    Impression:   1.  Recent craniotomy for meningioma  2.  Subgaleal/epidural infection-Serratia  3.  Impetigo/upper nose medial eye area-continue improvement  4.  Diabetes mellitus  5.  Coronary artery disease    Recommendations:   Continue cefepime  Plan a 3-week course of intravenous antibiotic treatment  Tentative stop date July 22, 2022  Suspect he will need LTAC or subacute rehab to complete his antibiotic therapy  Suggest warm compresses to impetiginous area-discussed with nursing  Continue to follow    Edwin Jeffers MD

## 2022-07-06 NOTE — PLAN OF CARE
Problem: Adult Inpatient Plan of Care  Goal: Plan of Care Review  Outcome: Ongoing, Not Progressing  Flowsheets (Taken 7/6/2022 1343)  Progress: no change  Plan of Care Reviewed With: patient  Outcome Evaluation:   Initial RDN eval. Pt reports he's hungry however he remains NPO at this time. Malnutrition assessment completed. Diabetes education deferred at this time as Pt easily drifts to sleep during our converation   did recommend he ask PCP for referral to OP Nutr counseling or OP DM classes once d/c'd from hospital   Goal Outcome Evaluation:  Plan of Care Reviewed With: patient        Progress: no change  Outcome Evaluation: Initial RDN eval. Pt reports he's hungry however he remains NPO at this time. Malnutrition assessment completed. Diabetes education deferred at this time as Pt easily drifts to sleep during our converation; did recommend he ask PCP for referral to OP Nutr counseling or OP DM classes once d/c'd from hospital

## 2022-07-06 NOTE — NURSING NOTE
Pt has been intermittently confused throughout the shift (disoriented to place/time/situation). Pt has had no focal deficits this shift with assessments yielding upper muscle strength grading of 5/5 and lower extremity grading of 4/5; all being equal bilaterally.  Pt received pain Norco 7.5 x1 for c/o HA. He reported pain resolve and appeared to sleep for several hours. Upon awaking, pt removed his PICC line. Site was dressed.   Pt had only 250mL of darker-yellow, somewhat concentrated urine out this shift with a 12 hour avg (5490-5047) of  56mL via urinal with no incontinent episodes.

## 2022-07-06 NOTE — PLAN OF CARE
Goal Outcome Evaluation:  Plan of Care Reviewed With: patient        Progress: improving  Outcome Evaluation: Pt was able to perform supine to sitting with min assist.  Transfered sit to stand with CGA. Amb 250' with straight cane and CGA/min assist.  Pt had one LOB to the right that required min assist to correct.    Pt would frequently repeat himself, also he would forget what we were doing during the tx.  Pt may benefit from a speech eval to assist with cognitive, memory, and safety.

## 2022-07-06 NOTE — PROGRESS NOTES
[facility] Medicine Services  INPATIENT PROGRESS NOTE    Patient Name: Elijah Escobar  Date of Admission: 6/30/2022  Today's Date: 07/06/22  Length of Stay: 6  Primary Care Physician: Dylan Lama APRN    Subjective   Chief Complaint: elevated blood sugar  HPI   7/4 Resting in bed, appears tired and mildly ill. Does not offer any new complaints.  7/5 Patient transferred to critical care for closer neurological observation and blood sugar control.   7/6 No new complaints. Blood sugar 100-120 range on insulin drip and NPO. HR is 70 with some PAC and long QT ?AV block. Sotalol on hold since last night.     Review of Systems   All pertinent negatives and positives are as above. All other systems have been reviewed and are negative unless otherwise stated.     Objective    Temp:  [97.8 °F (36.6 °C)-98 °F (36.7 °C)] 97.8 °F (36.6 °C)  Heart Rate:  [] 67  Resp:  [15-18] 16  BP: (129-155)/() 151/76  Physical Exam  GEN: Awake, alert, interactive, in NAD  HEENT:   Head is bandaged.  EOMI, Anicteric, Trachea midline  Lungs:  no wheezing/rales/rhonchi  Heart: irreg/irreg, +S1/s2, no rub  ABD: soft, nt/nd, +BS, no guarding/rebound  Extremities: atraumatic, no cyanosis  Skin: no rashes or lesions  Neuro: AAOx3, DENISE  Psych: normal mood & affect    Results Review:  I have reviewed the labs, radiology results, and diagnostic studies.    Laboratory Data:   Results from last 7 days   Lab Units 07/06/22  0740 07/05/22  0754 07/03/22  0324   WBC 10*3/mm3 8.48 11.26* 10.67   HEMOGLOBIN g/dL 10.3* 10.8* 10.5*   HEMATOCRIT % 32.5* 31.7* 32.5*   PLATELETS 10*3/mm3 261 278 266        Results from last 7 days   Lab Units 07/06/22  0740 07/05/22  0754 07/03/22  0324 07/02/22  0546 07/01/22  0556   SODIUM mmol/L 138 136 137   < > 141   POTASSIUM mmol/L 3.8 4.8 4.3   < > 3.9   CHLORIDE mmol/L 103 101 102   < > 106   CO2 mmol/L 26.0 22.0 28.0   < > 28.0   BUN mg/dL 9 11 12   < > 8    CREATININE mg/dL 0.77 1.39* 0.86   < > 0.77   CALCIUM mg/dL 8.7 8.6 8.1*   < > 8.8   BILIRUBIN mg/dL 0.5 0.5  --   --  0.2   ALK PHOS U/L 100 103  --   --  109   ALT (SGPT) U/L 16 18  --   --  37   AST (SGOT) U/L 11 12  --   --  19   GLUCOSE mg/dL 102* 226* 182*   < > 140*    < > = values in this interval not displayed.       Culture Data:   Blood Culture   Date Value Ref Range Status   06/30/2022 No growth at 3 days  Preliminary   06/30/2022 No growth at 3 days  Preliminary     Urine Culture   Date Value Ref Range Status   06/30/2022 <10,000 CFU/mL Gram Positive Cocci (A)  Final     Comment:     Call if further workup needed.       Wound Culture   Date Value Ref Range Status   07/01/2022 Scant growth (1+) Serratia marcescens (A)  Final       Radiology Data:   Imaging Results (Last 24 Hours)     ** No results found for the last 24 hours. **          I have reviewed the patient's current medications.     Assessment/Plan     Active Hospital Problems    Diagnosis    • Post-operative infection    • S/P craniotomy      Added automatically from request for surgery [id number]     • Paroxysmal atrial fibrillation with rapid ventricular response (HCC)    • Coronary artery disease    • Type 2 diabetes mellitus with hyperglycemia and peripheral neuropathy, with long-term current use of insulin (HCC)    • Hypertension    • GERD (gastroesophageal reflux disease)        Plan:  Status post craniotomy > Per neurosurgery  Suspected epidural infection > ID input noted  DM uncontrolled > Levemir to 25 units BID d/c  Humalog sliding scale d/c  Humulin R drip started 7/5    PAF > Off Cardizem drip to Sotalol > held due to QT  EKG today  Check Magnesium/Phosphorus    Will continue to follow    Discharge Planning: Per attending.    Electronically signed by Angel Sarabia MD, 07/06/22, 09:20 CDT.

## 2022-07-06 NOTE — CONSULTS
Patient or patient's representative gives informed consent after an explanation of the risks (air embolism; catheter occlusion; phlebitis; catheter infection; bloodstream infection; vein thrombosis; hematoma/bleeding at the insertion site; slight discomfort; accidental puncture of an artery, nerve, or tendon; dislodging of device), benefits (longer dwell time, outpatient treatment, medications that cannot run peripherally), and alternatives (short peripheral catheter, centrally inserted central line, or permanent catheter) of PICC placement. Time provided to ask and answer questions.    Pt had 4 [place] single lumen PICC placed in right cephalic vein. Pt tolerated procedure well. 48 cm of catheter internal and 0 cm external. Tip confirmation by sherlock 3CG. All lumens flush and draw well. Sterile dressing applied.

## 2022-07-07 LAB
GLUCOSE BLDC GLUCOMTR-MCNC: 102 MG/DL (ref 70–130)
GLUCOSE BLDC GLUCOMTR-MCNC: 110 MG/DL (ref 70–130)
GLUCOSE BLDC GLUCOMTR-MCNC: 119 MG/DL (ref 70–130)
GLUCOSE BLDC GLUCOMTR-MCNC: 120 MG/DL (ref 70–130)
GLUCOSE BLDC GLUCOMTR-MCNC: 124 MG/DL (ref 70–130)
GLUCOSE BLDC GLUCOMTR-MCNC: 132 MG/DL (ref 70–130)
GLUCOSE BLDC GLUCOMTR-MCNC: 132 MG/DL (ref 70–130)
GLUCOSE BLDC GLUCOMTR-MCNC: 134 MG/DL (ref 70–130)
GLUCOSE BLDC GLUCOMTR-MCNC: 143 MG/DL (ref 70–130)
GLUCOSE BLDC GLUCOMTR-MCNC: 145 MG/DL (ref 70–130)
GLUCOSE BLDC GLUCOMTR-MCNC: 148 MG/DL (ref 70–130)
GLUCOSE BLDC GLUCOMTR-MCNC: 166 MG/DL (ref 70–130)
GLUCOSE BLDC GLUCOMTR-MCNC: 170 MG/DL (ref 70–130)
GLUCOSE BLDC GLUCOMTR-MCNC: 91 MG/DL (ref 70–130)
GLUCOSE BLDC GLUCOMTR-MCNC: 92 MG/DL (ref 70–130)
GLUCOSE BLDC GLUCOMTR-MCNC: 94 MG/DL (ref 70–130)

## 2022-07-07 PROCEDURE — 99024 POSTOP FOLLOW-UP VISIT: CPT | Performed by: NURSE PRACTITIONER

## 2022-07-07 PROCEDURE — 82962 GLUCOSE BLOOD TEST: CPT

## 2022-07-07 PROCEDURE — 97535 SELF CARE MNGMENT TRAINING: CPT | Performed by: OCCUPATIONAL THERAPIST

## 2022-07-07 PROCEDURE — 97530 THERAPEUTIC ACTIVITIES: CPT | Performed by: OCCUPATIONAL THERAPIST

## 2022-07-07 PROCEDURE — 97116 GAIT TRAINING THERAPY: CPT

## 2022-07-07 PROCEDURE — 25010000002 CEFEPIME PER 500 MG: Performed by: NURSE PRACTITIONER

## 2022-07-07 PROCEDURE — 97110 THERAPEUTIC EXERCISES: CPT

## 2022-07-07 RX ORDER — DILTIAZEM HCL IN NACL,ISO-OSM 125 MG/125
5-15 PLASTIC BAG, INJECTION (ML) INTRAVENOUS
Status: DISCONTINUED | OUTPATIENT
Start: 2022-07-07 | End: 2022-07-11 | Stop reason: HOSPADM

## 2022-07-07 RX ADMIN — Medication 10 ML: at 08:29

## 2022-07-07 RX ADMIN — SOTALOL HYDROCHLORIDE 120 MG: 80 TABLET ORAL at 20:39

## 2022-07-07 RX ADMIN — OXYCODONE AND ACETAMINOPHEN 1 TABLET: 325; 10 TABLET ORAL at 03:50

## 2022-07-07 RX ADMIN — OXYCODONE AND ACETAMINOPHEN 1 TABLET: 325; 10 TABLET ORAL at 08:33

## 2022-07-07 RX ADMIN — Medication 10 ML: at 08:26

## 2022-07-07 RX ADMIN — SOTALOL HYDROCHLORIDE 120 MG: 80 TABLET ORAL at 08:26

## 2022-07-07 RX ADMIN — LISINOPRIL 40 MG: 20 TABLET ORAL at 08:26

## 2022-07-07 RX ADMIN — CEFEPIME HYDROCHLORIDE 2 G: 2 INJECTION, POWDER, FOR SOLUTION INTRAVENOUS at 05:12

## 2022-07-07 RX ADMIN — SODIUM CHLORIDE 5 MG/HR: 9 INJECTION, SOLUTION INTRAVENOUS at 22:19

## 2022-07-07 RX ADMIN — OXYCODONE AND ACETAMINOPHEN 1 TABLET: 325; 10 TABLET ORAL at 20:46

## 2022-07-07 RX ADMIN — GABAPENTIN 300 MG: 300 CAPSULE ORAL at 08:25

## 2022-07-07 RX ADMIN — LEVETIRACETAM 500 MG: 500 TABLET, FILM COATED ORAL at 20:39

## 2022-07-07 RX ADMIN — SODIUM CHLORIDE 5 MG/HR: 9 INJECTION, SOLUTION INTRAVENOUS at 11:31

## 2022-07-07 RX ADMIN — DOCUSATE SODIUM 50 MG AND SENNOSIDES 8.6 MG 2 TABLET: 8.6; 5 TABLET, FILM COATED ORAL at 08:26

## 2022-07-07 RX ADMIN — FAMOTIDINE 40 MG: 20 TABLET, FILM COATED ORAL at 08:26

## 2022-07-07 RX ADMIN — ATORVASTATIN CALCIUM 20 MG: 10 TABLET, FILM COATED ORAL at 20:39

## 2022-07-07 RX ADMIN — CEFEPIME HYDROCHLORIDE 2 G: 2 INJECTION, POWDER, FOR SOLUTION INTRAVENOUS at 22:19

## 2022-07-07 RX ADMIN — OXYCODONE AND ACETAMINOPHEN 1 TABLET: 325; 10 TABLET ORAL at 16:15

## 2022-07-07 RX ADMIN — SODIUM CHLORIDE 5 MG/HR: 9 INJECTION, SOLUTION INTRAVENOUS at 06:43

## 2022-07-07 RX ADMIN — Medication 10 ML: at 20:41

## 2022-07-07 RX ADMIN — LEVETIRACETAM 500 MG: 500 TABLET, FILM COATED ORAL at 08:26

## 2022-07-07 RX ADMIN — GABAPENTIN 300 MG: 300 CAPSULE ORAL at 20:38

## 2022-07-07 RX ADMIN — DILTIAZEM HYDROCHLORIDE 240 MG: 240 CAPSULE, EXTENDED RELEASE ORAL at 08:26

## 2022-07-07 RX ADMIN — GABAPENTIN 300 MG: 300 CAPSULE ORAL at 16:15

## 2022-07-07 RX ADMIN — SODIUM CHLORIDE 5 MG/HR: 9 INJECTION, SOLUTION INTRAVENOUS at 16:23

## 2022-07-07 RX ADMIN — CEFEPIME HYDROCHLORIDE 2 G: 2 INJECTION, POWDER, FOR SOLUTION INTRAVENOUS at 14:35

## 2022-07-07 NOTE — THERAPY TREATMENT NOTE
Patient Name: Elijah Escobar  : 1955    MRN: [id number]                              Today's Date: 2022       Admit Date: 2022    Visit Dx:     ICD-10-CM ICD-9-CM   1. Postoperative infection, unspecified type, initial encounter  T81.40XA 998.59   2. S/P craniotomy  Z98.890 V45.89   3. Impaired mobility and ADLs  Z74.09 V49.89    Z78.9    4. Impaired mobility  Z74.09 799.89     Patient Active Problem List   Diagnosis   • Meningioma s/p craniotomy   • Hypertension   • GERD (gastroesophageal reflux disease)   • Coronary artery disease   • Morbid obesity (HCC)   • Type 2 diabetes mellitus with hyperglycemia and peripheral neuropathy, with long-term current use of insulin (HCC)   • Leukocytosis   • Other specified anemias   • Paroxysmal atrial fibrillation with rapid ventricular response (HCC)   • Atrial fibrillation with RVR (HCC)   • Post-operative infection   • S/P craniotomy     Past Medical History:   Diagnosis Date   • Brain tumor (HCC)    • Coronary artery disease    • COVID-19 vaccine series completed     MODERNA X 3; LAST DOSE 3/2022   • Diabetes (HCC)    • Erectile dysfunction    • GERD (gastroesophageal reflux disease)    • Hypercholesteremia    • Hypertension      Past Surgical History:   Procedure Laterality Date   • BALLOON ANGIOPLASTY, ARTERY Right    • COLONOSCOPY     • CRANIECTOMY Right 2022    Procedure: CRANIECTOMY WITH INCISIONAL WASHOUT;  Surgeon: Felipe Banerjee MD;  Location:  [facility] OR;  Service: Neurosurgery;  Laterality: Right;   • CRANIOTOMY FOR TUMOR Right 2022    Procedure: CRANIOTOMY FOR TUMOR STERIOTACTIC WITH BRAIN LAB, right;  Surgeon: Flaco Portillo MD;  Location:  [facility] OR;  Service: Neurosurgery;  Laterality: Right;      General Information     Row Name 22 0906          OT Time and Intention    Document Type therapy note (daily note)  -MENDEL (r) DL (t) MENDEL (c)     Mode of Treatment occupational therapy  -MENDEL (r) DL (t) JTITI (c)     Row Name  07/07/22 0906          General Information    Patient Profile Reviewed yes  -JJ (r) AD (t) JJ (c)     Existing Precautions/Restrictions fall;other (see comments)  helmet OOB  -JJ (r) AD (t) JJ (c)           User Key  (r) = Recorded By, (t) = Taken By, (c) = Cosigned By    Initials Name Provider Type    Coleen Martinez OTR/L, ZULMAS Occupational Therapist    Sara Davis, OT Student OT Student                 Mobility/ADL's     Row Name 07/07/22 0906          Bed Mobility    Bed Mobility supine-sit;sit-supine  -JJ (r) AD (t) JJ (c)     Supine-Sit [place] (Bed Mobility) verbal cues;minimum assist (75% patient effort)  -JJ (r) AD (t) JJ (c)     Sit-Supine [place] (Bed Mobility) verbal cues;standby assist  -JJ (r) AD (t) JJ (c)     Assistive Device (Bed Mobility) head of bed elevated;bed rails  -JJ (r) AD (t) JJ (c)     Row Name 07/07/22 0906          Transfers    Comment, (Transfers) Unable to get out of bed this date d/t HR  -JJ (r) AD (t) JJ (c)     Row Name 07/07/22 0906          Lower Body Dressing Assessment/Training    [place] Level (Lower Body Dressing) socks;standby assist  -JJ (r) AD (t) JJ (c)     Position (Lower Body Dressing) edge of bed sitting  -JJ (r) AD (t) JJ (c)     Comment, (Lower Body Dressing) Pt adjusted socks sitting EOB with vcs. Completed with SBA for balance  -JJ (r) AD (t) JJ (c)           User Key  (r) = Recorded By, (t) = Taken By, (c) = Cosigned By    Initials Name Provider Type    Coleen Martinez OTR/L, ALEISHA Occupational Therapist    Sara Davis, OT Student OT Student               Obj/Interventions     Row Name 07/07/22 0906          Balance    Balance Assessment sitting static balance;sitting dynamic balance  -JJ (r) AD (t) JJ (c)     Static Sitting Balance standby assist  -JJ (r) AD (t) JJ (c)     Dynamic Sitting Balance contact guard  -JJ (r) AD (t) JJ (c)     Position, Sitting Balance sitting edge of bed  -JJ (r) AD (t) JJ (c)     Balance  Interventions sitting;dynamic;static;dynamic reaching;occupation based/functional task  -JJ (r) AD (t) JTITI (c)     Comment, Balance Balance activities completed in bed this date d/t Afib. Pt completed dynamic reaching tasks to laterally to L/R, and down to the L/R to work on balance for safety and ADL/IADL completion.  -JJ (r) AD (t) JJ (c)           User Key  (r) = Recorded By, (t) = Taken By, (c) = Cosigned By    Initials Name Provider Type    Coleen Martinez, OTR/L, CSRS Occupational Therapist    Sara Davis, OT Student OT Student               Goals/Plan    No documentation.                Clinical Impression     Row Name 07/07/22 0906          Pain Assessment    Pretreatment Pain Rating 5/10  -JJ (r) AD (t) JJ (c)     Posttreatment Pain Rating 5/10  -JJ (r) AD (t) JJ (c)     Pain Location - head  -JJ (r) AD (t) JJ (c)     Pain Intervention(s) Repositioned;Rest  -JJ (r) AD (t) JJ (c)     Row Name 07/07/22 0906          Plan of Care Review    Plan of Care Reviewed With patient  -JJ (r) AD (t) JTITI (c)     Outcome Evaluation OT tx complete. Pt alert and agreeable to session, however upon entry to room, pt in Afib. D/t increased HR, all activities completed in bed. Pt SBA with bed mobility requiring assist to pull up into sitting. Pt sat EOB for session, with minimal dizziness upon sitting. Pt adjusted socks sitting EOB with no LOB. Pt completed dynamic reaching exercises sitting EOB to improve balance for ADL, IADL, and safety. Pt reports feeling depressed and questioning why he still has to be in the hospital. Encouragement and education provided. Continue OT POC.  -JJ (r) AD (t) JTITI (c)     Row Name 07/07/22 0906          Vital Signs    O2 Delivery Pre Treatment room air  -JJ (r) AD (t) JJ (c)     O2 Delivery Intra Treatment room air  -JJ (r) AD (t) JJ (c)     O2 Delivery Post Treatment room air  -JJ (r) AD (t) JJ (c)     Row Name 07/07/22 0906          Positioning and Restraints    Pre-Treatment  Position in bed  -JJ (r) AD (t) JJ (c)     Post Treatment Position bed  -JJ (r) AD (t) JJ (c)     In Bed fowlers;call light within reach;encouraged to call for assist;side rails up x3;SCD pump applied;patient within staff view  -JJ (r) AD (t) JJ (c)           User Key  (r) = Recorded By, (t) = Taken By, (c) = Cosigned By    Initials Name Provider Type    Coleen Martinez OTR/L, ALEISHA Occupational Therapist    Sara Davis, OT Student OT Student               Outcome Measures     Row Name 07/07/22 0906          How much help from another is currently needed...    Putting on and taking off regular lower body clothing? 3  -JJ (r) AD (t) JJ (c)     Bathing (including washing, rinsing, and drying) 2  -JJ (r) AD (t) JJ (c)     Toileting (which includes using toilet bed pan or urinal) 2  -JJ (r) AD (t) JJ (c)     Putting on and taking off regular upper body clothing 3  -JJ (r) AD (t) JJ (c)     Taking care of personal grooming (such as brushing teeth) 3  -JJ (r) AD (t) JJ (c)     Eating meals 3  -JJ (r) AD (t) JJ (c)     AM-PAC 6 Clicks Score (OT) 16  -JJ (r) AD (t)           User Key  (r) = Recorded By, (t) = Taken By, (c) = Cosigned By    Initials Name Provider Type    Coleen Martinez OTR/L, CSRS Occupational Therapist    Sara Davis, OT Student OT Student                Occupational Therapy Education                 Title: PT OT SLP Therapies (In Progress)     Topic: Occupational Therapy (Done)     Point: ADL training (Done)     Description:   Instruct learner(s) on proper safety adaptation and remediation techniques during self care or transfers.   Instruct in proper use of assistive devices.              Learning Progress Summary           Patient Acceptance, E, VU by AD at 7/7/2022 1002    Acceptance, E, VU by AD at 7/6/2022 1032    Acceptance, E,TB, VU,NR by  at 7/5/2022 2250    Comment: connie called on the phone and also received education.    Acceptance, E,D, NR by MW at 7/5/2022 7112    Family Acceptance, E,TB, VU,NR by  at 7/5/2022 2250    Comment: connie called on the phone and also received education.                   Point: Home exercise program (Done)     Description:   Instruct learner(s) on appropriate technique for monitoring, assisting and/or progressing therapeutic exercises/activities.              Learning Progress Summary           Patient Acceptance, E, VU by AD at 7/7/2022 1002    Acceptance, E, VU by AD at 7/6/2022 1032    Acceptance, E,TB, VU,NR by  at 7/5/2022 2250    Comment: connie called on the phone and also received education.    Acceptance, E,D, NR by  at 7/5/2022 0942   Family Acceptance, E,TB, VU,NR by  at 7/5/2022 2250    Comment: connie called on the phone and also received education.                               User Key     Initials Effective Dates Name Provider Type Discipline     06/16/21 -  Arlen Hansen, RN Registered Nurse Nurse     08/28/18 -  Maya Rodriguez, OTR/L Occupational Therapist OT     05/04/22 -  Sara Florez OT Student OT Student OT              OT Recommendation and Plan     Plan of Care Review  Plan of Care Reviewed With: patient  Outcome Evaluation: OT tx complete. Pt alert and agreeable to session, however upon entry to room, pt in Afib. D/t increased HR, all activities completed in bed. Pt SBA with bed mobility requiring assist to pull up into sitting. Pt sat EOB for session, with minimal dizziness upon sitting. Pt adjusted socks sitting EOB with no LOB. Pt completed dynamic reaching exercises sitting EOB to improve balance for ADL, IADL, and safety. Pt reports feeling depressed and questioning why he still has to be in the hospital. Encouragement and education provided. Continue OT POC.     Time Calculation:    Time Calculation- OT     Row Name 07/07/22 1002             Time Calculation- OT    OT Start Time 0906  -JJ (r) AD (t) JJ (c)      OT Stop Time 0938  -JJ (r) AD (t) JJ (c)      OT Time Calculation (min) 32 min  -JJ  (r) AD (t)      Total Timed Code Minutes- OT 32 minute(s)  -JJ (r) AD (t) JJ (c)      OT Received On 07/07/22  -JJ (r) AD (t) JTITI (c)              Timed Charges    24219 - OT Therapeutic Activity Minutes 20  -JJ (r) AD (t) JTITI (c)      86150 - OT Self Care/Mgmt Minutes 12  -JJ (r) AD (t) JJ (c)              Total Minutes    Timed Charges Total Minutes 32  -JJ (r) AD (t)       Total Minutes 32  -JJ (r) AD (t)            User Key  (r) = Recorded By, (t) = Taken By, (c) = Cosigned By    Initials Name Provider Type    Coleen Martinez, PETER/L, CSRS Occupational Therapist    Sara Davis, OT Student OT Student                       Sara Florez OT Student  7/7/2022

## 2022-07-07 NOTE — PROGRESS NOTES
Nutrition Services    Patient Name:  Elijah Escobar  YOB: 1955  MRN: [id number]  Admit Date:  6/30/2022    Pt has been NPO x 3 days. If unable to initiate a po diet soon, he may benefit from alternate means of nutrition support. Will follow for diet initiation. If nutrition support is desired, please consult dietitian.    Electronically signed by:  Pina Whitt RDN, RIGO  07/07/22 08:59 CDT

## 2022-07-07 NOTE — PROGRESS NOTES
Daily Progress Note    ASSESSMENT:   Elijah Escobar is a 66 y.o. male with a significant medical history of recent craniotomy for tumor, 6/16/2022, coronary artery disease, A. fib, diabetes, erectile dysfunction, GERD, hypertension, hyperlipidemia. He presents with a new problem of a persistent right sided headache, particularly over the right temporal region and dizziness with standing.  Physical exam findings of tense but fluctuant area of edema from the mid frontal to right parietal region of the scalp, the area is tender to palpation, periorbital lesions with purulent drainage from the left eye, scalp incision is clean, dry, and intact, bright and awake, oriented x3, speech is clear, follows commands without prompting showing thumbs up and 2 fingers bilaterally, no pronator drift, left dysmetria, gross hyporeflexia, and bilateral Babinski.  CT of the head from 6/23/2022, showing expected postsurgical changes to the right frontal lobe, small amount of fluid collection and pneumocephalus in the right subdural space, small amount of vasogenic edema with 4.3 mm left-to-right midline shift.  Repeat CT of the head obtained 6/30/2022 shows no acute intracranial blood products, unresolved vasogenic edema, midline shift resolved, increased left frontal and temporal soft tissue swelling.  MRI of the brain from 6/30/2022 shows a 7.9 x 6.5 x 0.9 cm rim-enhancing fluid collection overlying the bone flap concerning for pseudomeningocele versus infection.    Past Medical History:   Diagnosis Date   • Brain tumor (HCC)    • Coronary artery disease    • COVID-19 vaccine series completed     MODERNA X 3; LAST DOSE 3/2022   • Diabetes (HCC)    • Erectile dysfunction    • GERD (gastroesophageal reflux disease)    • Hypercholesteremia    • Hypertension      Active Hospital Problems    Diagnosis    • Post-operative infection    • S/P craniotomy      Added automatically from request for surgery [id number]     • Paroxysmal atrial  "fibrillation with rapid ventricular response (HCC)    • Coronary artery disease    • Type 2 diabetes mellitus with hyperglycemia and peripheral neuropathy, with long-term current use of insulin (HCC)    • Hypertension    • GERD (gastroesophageal reflux disease)      CHIEF COMPLAINT:  Right sided headache and intermittent dizziness     PLAN:   Neuro: Stable.  Neuro at baseline at present.  Bright awake.  Oriented x3   6 Days Post-Op (7/1/2022) I&D and washout and craniectomy   Wound culture positive for Serratia   Continue Kera   Keep ICU for close neurologic monitoring and insulin drip      CV: Continuous cardiac monitoring.  PICC placed to left upper extremity.  Intermittent use of Cardene to keep SBP <140.  Pulm: Continuous pulse oximetry.  O2.  Maintaining O2 sat.  : Voiding.  Bladder scans and I/O cath per policy.  Trace leuks, 6-12 WBCs.  Urine culture pending.  FEN: Tolerating carb consistent diet.    GI: No acute issues  ENDO: Insulin drip for tight glucose control.  Accu-Cheks per policy.   Diabetic educator consulted.  ID: [id number].  Wound culture positive for Serratia.  Continue cefepime per ID.  Weekly CRP and ESR.  CRP 5.93.  ESR 80.  Heme:  DVT prophylaxis, SCDs  Pain: Tolerable at present  Dispo: PT/OT.  Strict use of cranial helmet while out of bed     Subjective  Symptom stable.  Doing well    Temp:  [98 °F (36.7 °C)-98.5 °F (36.9 °C)] 98.5 °F (36.9 °C)  Heart Rate:  [] 97  Resp:  [9-22] 15  BP: ()/() 139/76    Objective:  General Appearance:  Well-appearing and in no acute distress.    Vital signs: (most recent): Blood pressure 139/76, pulse 97, temperature 98.5 °F (36.9 °C), temperature source Oral, resp. rate 15, height 177.8 cm (70\"), weight 114 kg (250 lb 12.8 oz), SpO2 97 %.      Neurologic Exam     Mental Status   Oriented to person, place, and time.   Attention: normal. Concentration: normal.   Speech: speech is normal   Level of consciousness: alert    Bright and awake.  " Oriented x3.  Follows commands without prompting showing thumbs up and 2 fingers bilaterally.     Cranial Nerves     CN II   Visual fields full to confrontation.     CN III, IV, VI   Pupils are equal, round, and reactive to light.  Extraocular motions are normal.     CN V   Facial sensation intact.     CN VII   Facial expression full, symmetric.     CN VIII   CN VIII normal.     CN IX, X   CN IX normal.     CN XI   CN XI normal.     Motor Exam   Right arm tone: normal  Left arm tone: normal  Right leg tone: normal  Left leg tone: normal    Strength   Right deltoid: 5/5  Left deltoid: 5/5  Right biceps: 5/5  Left biceps: 5/5  Right triceps: 5/5  Left triceps: 5/5  Right wrist extension: 5/5  Left wrist extension: 5/5  Right iliopsoas: 5/5  Left iliopsoas: 5/5  Right quadriceps: 5/5  Left quadriceps: 5/5  Right anterior tibial: 5/5  Left anterior tibial: 5/5  Right gastroc: 5/5  Left gastroc: 5/5  Right EHL 5/5  Left EHL 5/5       Sensory Exam   Right arm light touch: normal  Left arm light touch: normal  Right leg light touch: normal  Left leg light touch: normal    Gait, Coordination, and Reflexes     Tremor   Resting tremor: absent  Intention tremor: absent  Action tremor: absent    DRAINS:   [REMOVED] Closed/Suction Drain 1 Scalp Bulb (Removed)   Site Description Unable to view 06/19/22 0735   Dressing Status Clean;Dry;Intact 06/19/22 0735   Drainage Appearance Serosanguineous 06/19/22 0735   Status To bulb suction 06/19/22 0735   Output (mL) 5 mL 06/19/22 0723       [REMOVED] Urethral Catheter Non-latex;Silicone 16 Fr. (Removed)   Daily Indications Selected surgeries ( tract, abdomen) 06/16/22 1339   Site Assessment Clean;Skin intact 06/16/22 1339   Collection Container Standard drainage bag 06/16/22 1339   Securement Method Securing device 06/16/22 1339   Output (mL) 1100 mL 06/16/22 1233     LAB RESULTS:  ABG:     CBC:   Results from last 7 days   Lab Units 07/06/22  0740 07/05/22  0754 07/03/22  5494  07/02/22  0546 07/01/22  0556 06/30/22  1225   WBC 10*3/mm3 8.48 11.26* 10.67 13.63* 10.28 14.28*   HEMOGLOBIN g/dL 10.3* 10.8* 10.5* 10.8* 9.4* 10.3*   HEMATOCRIT % 32.5* 31.7* 32.5* 33.0* 30.5* 31.2*   PLATELETS 10*3/mm3 261 278 266 314 280 282     BMP:  Results from last 7 days   Lab Units 07/06/22  0740 07/05/22  0754 07/03/22  0324 07/02/22  0546 07/01/22  0556 06/30/22  1225   SODIUM mmol/L 138 136 137 135* 141 139   POTASSIUM mmol/L 3.8 4.8 4.3 4.4 3.9 4.5   CHLORIDE mmol/L 103 101 102 99 106 105   CO2 mmol/L 26.0 22.0 28.0 22.0 28.0 25.0   BUN mg/dL 9 11 12 8 8 8   CREATININE mg/dL 0.77 1.39* 0.86 0.71* 0.77 0.68*   GLUCOSE mg/dL 102* 226* 182* 228* 140* 202*   CALCIUM mg/dL 8.7 8.6 8.1* 8.4* 8.8 8.8   ALT (SGPT) U/L 16 18  --   --  37 45*     Culture Results:   Blood Culture   Date Value Ref Range Status   06/30/2022 No growth at 4 days  Preliminary   06/30/2022 No growth at 4 days  Preliminary     Urine Culture   Date Value Ref Range Status   06/30/2022 <10,000 CFU/mL Gram Positive Cocci (A)  Final     Comment:     Call if further workup needed.       Wound Culture   Date Value Ref Range Status   07/01/2022 Scant growth (1+) Serratia marcescens (A)  Final     Imaging Results (Last 24 Hours)     ** No results found for the last 24 hours. **        Lab Results (last 24 hours)     Procedure Component Value Units Date/Time    POC Glucose Once [[id number]]  (Normal) Collected: 07/07/22 0845    Specimen: Blood Updated: 07/07/22 0857     Glucose 102 mg/dL      Comment: : [id number] Carroll BilliMeter ID: [id number]       POC Glucose Once [[id number]]  (Normal) Collected: 07/07/22 0747    Specimen: Blood Updated: 07/07/22 0759     Glucose 92 mg/dL      Comment: : [id number] Glen BilliMeter ID: [id number]       POC Glucose Once [[id number]]  (Normal) Collected: 07/07/22 0645    Specimen: Blood Updated: 07/07/22 0706     Glucose 91 mg/dL      Comment: : [id number] Jaciel Garcia ID: [id number]        POC Glucose Once [[id number]]  (Normal) Collected: 07/07/22 0548    Specimen: Blood Updated: 07/07/22 0609     Glucose 94 mg/dL      Comment: : [id number] Jaciel SandraMeter ID: [id number]       POC Glucose Once [[id number]]  (Normal) Collected: 07/07/22 0446    Specimen: Blood Updated: 07/07/22 0507     Glucose 110 mg/dL      Comment: : [id number] Grissom SandraMeter ID: [id number]       POC Glucose Once [[id number]]  (Abnormal) Collected: 07/07/22 0344    Specimen: Blood Updated: 07/07/22 0405     Glucose 143 mg/dL      Comment: : [id number] Grissom SandraMeter ID: [id number]       POC Glucose Once [[id number]]  (Normal) Collected: 07/07/22 0150    Specimen: Blood Updated: 07/07/22 0211     Glucose 119 mg/dL      Comment: : [id number] Grissom SandraMeter ID: [id number]       POC Glucose Once [[id number]]  (Normal) Collected: 07/06/22 2351    Specimen: Blood Updated: 07/07/22 0012     Glucose 124 mg/dL      Comment: : [id number] Grissom SandraMeter ID: [id number]       POC Glucose Once [[id number]]  (Normal) Collected: 07/06/22 2253    Specimen: Blood Updated: 07/06/22 2314     Glucose 120 mg/dL      Comment: : [id number] Grissom SandraMeter ID: [id number]       POC Glucose Once [[id number]]  (Normal) Collected: 07/06/22 2151    Specimen: Blood Updated: 07/06/22 2213     Glucose 118 mg/dL      Comment: : [id number] Grissom SandraMeter ID: [id number]       POC Glucose Once [[id number]]  (Normal) Collected: 07/06/22 2048    Specimen: Blood Updated: 07/06/22 2109     Glucose 121 mg/dL      Comment: : [id number] Grissom SandraMeter ID: [id number]       Urine Culture - Urine, Urine, Catheter In/Out [[id number]]  (Normal) Collected: 07/05/22 1846    Specimen: Urine, Catheter In/Out Updated: 07/06/22 2017     Urine Culture No growth    POC Glucose Once [[id number]]  (Normal) Collected: 07/06/22 1943    Specimen: Blood Updated: 07/06/22 1954     Glucose 93 mg/dL      Comment: :  SRINIVASAN Santacruz KaleighferMeter ID: [id number]       POC Glucose Once [[id number]]  (Normal) Collected: 07/06/22 1734    Specimen: Blood Updated: 07/06/22 1745     Glucose 109 mg/dL      Comment: : [id number] Margi NehemiasMeter ID: [id number]       POC Glucose Once [[id number]]  (Normal) Collected: 07/06/22 1533    Specimen: Blood Updated: 07/06/22 1545     Glucose 102 mg/dL      Comment: : ARELIS KapoorieMeter ID: [id number]       POC Glucose Once [[id number]]  (Normal) Collected: 07/06/22 1333    Specimen: Blood Updated: 07/06/22 1344     Glucose 109 mg/dL      Comment: : JPEDILBERTO KapoorieMeter ID: [id number]       POC Glucose Once [[id number]]  (Normal) Collected: 07/06/22 1125    Specimen: Blood Updated: 07/06/22 1140     Glucose 119 mg/dL      Comment: : JPEDILBERTO KapoorieMeter ID: [id number]       Phosphorus [[id number]]  (Normal) Collected: 07/06/22 0740    Specimen: Blood Updated: 07/06/22 0952     Phosphorus 3.4 mg/dL     Magnesium [[id number]]  (Normal) Collected: 07/06/22 0740    Specimen: Blood Updated: 07/06/22 0952     Magnesium 2.1 mg/dL     POC Glucose Once [[id number]]  (Normal) Collected: 07/06/22 0924    Specimen: Blood Updated: 07/06/22 0935     Glucose 121 mg/dL      Comment: : [id number] Carroll BilliMeter ID: [id number]           Rahul Arnold, APRN

## 2022-07-07 NOTE — PROGRESS NOTES
[facility] Medicine Services  INPATIENT PROGRESS NOTE    Patient Name: Elijah Escobar  Date of Admission: 6/30/2022  Today's Date: 07/07/22  Length of Stay: 7  Primary Care Physician: Dylan Lama APRN    Subjective   Chief Complaint: elevated blood sugar  HPI   7/4 Resting in bed, appears tired and mildly ill. Does not offer any new complaints.  7/5 Patient transferred to critical care for closer neurological observation and blood sugar control.   7/6 No new complaints. Blood sugar 100-120 range on insulin drip and NPO. HR is 70 with some PAC and long QT ?AV block. Sotalol on hold since last night.   7/7 No new complaints. -160 sitting up Afib. Still NPO.     Review of Systems   All pertinent negatives and positives are as above. All other systems have been reviewed and are negative unless otherwise stated.     Objective    Temp:  [98 °F (36.7 °C)-98.5 °F (36.9 °C)] 98.5 °F (36.9 °C)  Heart Rate:  [] 110  Resp:  [9-22] 15  BP: ()/() 147/107  Physical Exam  GEN: Awake, alert, interactive, in NAD  HEENT:   Head is bandaged.  EOMI, Anicteric, Trachea midline  Lungs:  no wheezing/rales/rhonchi  Heart: irreg/irreg tachycardic, +S1/s2, no rub  ABD: soft, nt/nd, +BS, no guarding/rebound  Extremities: atraumatic, no cyanosis  Skin: no rashes or lesions  Neuro: AAOx3, DENISE  Psych: normal mood & affect    Results Review:  I have reviewed the labs, radiology results, and diagnostic studies.    Laboratory Data:   Results from last 7 days   Lab Units 07/06/22  0740 07/05/22 0754 07/03/22  0324   WBC 10*3/mm3 8.48 11.26* 10.67   HEMOGLOBIN g/dL 10.3* 10.8* 10.5*   HEMATOCRIT % 32.5* 31.7* 32.5*   PLATELETS 10*3/mm3 261 278 266        Results from last 7 days   Lab Units 07/06/22  0740 07/05/22  0754 07/03/22  0324 07/02/22  0546 07/01/22  0556   SODIUM mmol/L 138 136 137   < > 141   POTASSIUM mmol/L 3.8 4.8 4.3   < > 3.9   CHLORIDE mmol/L 103 101 102   < > 106    CO2 mmol/L 26.0 22.0 28.0   < > 28.0   BUN mg/dL 9 11 12   < > 8   CREATININE mg/dL 0.77 1.39* 0.86   < > 0.77   CALCIUM mg/dL 8.7 8.6 8.1*   < > 8.8   BILIRUBIN mg/dL 0.5 0.5  --   --  0.2   ALK PHOS U/L 100 103  --   --  109   ALT (SGPT) U/L 16 18  --   --  37   AST (SGOT) U/L 11 12  --   --  19   GLUCOSE mg/dL 102* 226* 182*   < > 140*    < > = values in this interval not displayed.       Culture Data:   Blood Culture   Date Value Ref Range Status   06/30/2022 No growth at 3 days  Preliminary   06/30/2022 No growth at 3 days  Preliminary     Urine Culture   Date Value Ref Range Status   06/30/2022 <10,000 CFU/mL Gram Positive Cocci (A)  Final     Comment:     Call if further workup needed.       Wound Culture   Date Value Ref Range Status   07/01/2022 Scant growth (1+) Serratia marcescens (A)  Final       Radiology Data:   Imaging Results (Last 24 Hours)     ** No results found for the last 24 hours. **          I have reviewed the patient's current medications.     Assessment/Plan     Active Hospital Problems    Diagnosis    • Post-operative infection    • S/P craniotomy      Added automatically from request for surgery [id number]     • Paroxysmal atrial fibrillation with rapid ventricular response (HCC)    • Coronary artery disease    • Type 2 diabetes mellitus with hyperglycemia and peripheral neuropathy, with long-term current use of insulin ([facility])    • Hypertension    • GERD (gastroesophageal reflux disease)        Plan:  Status post craniotomy > Per neurosurgery  Suspected epidural infection > ID input noted  DM uncontrolled > Levemir to 25 units BID d/c  Humalog sliding scale d/c  Humulin R drip started 7/5    PAF > Off Cardizem drip to Sotalol > held due to QT 7/6  Sotalol 120 at 830 AM  If no rate control in the next hour resume Cardizem drip and stop Cardene  EKG 7/6 noted  Check Magnesium/Phosphorus    Nutrition> ?consider TPN if NPO will be continued    Will continue to follow    Discharge Planning: Per  attending.    Electronically signed by Angel Sarabia MD, 07/07/22, 09:27 CDT.

## 2022-07-07 NOTE — PLAN OF CARE
Goal Outcome Evaluation:  Plan of Care Reviewed With: patient        Progress: improving  Outcome Evaluation: Insulin gtt d/c'd. Pt remains on Cardene at 5 mg/hr. Pt convered to NSR this afternoon. Accuchecks now Q6H. Cardiac ADA diet. Pt requesting apple juice a lot; advised him of importance of refraining from sugary drinks. Pt had BM today. Administered Percocet for back and head pain today. Pt was up out of bed multiple times today. Pt remains confused at times, unable to state year, president, or his current whereabouts.  Jamie Carroll RN 7/7/2022 18:31 CDT

## 2022-07-07 NOTE — PLAN OF CARE
Goal Outcome Evaluation:  Plan of Care Reviewed With: patient        Progress: no change  Outcome Evaluation: Pt was up using the bathroom.  Able to transfer sit to stand with CGA.  Amb 15' with straight cane and CGA.  Encouraged pt to attempt to amb further, but pt declined.  He stated he felt too fatigued and weak.  Pt seemed confused to medical situation and being in the hospital.  He needed  redirection while attempting to walk.  Will continue to work with pt to increase strength and progress amb.

## 2022-07-07 NOTE — PROGRESS NOTES
"Infectious Diseases Progress Note    Patient:  Elijah Escobar  YOB: 1955  MRN: [id number]   Admit date: 6/30/2022   Admitting Physician: Flaco Portillo, *  Primary Care Physician: Dylan Lama APRN    Chief Complaint/Interval History: He seems to be getting better.  No headache at present.  Blood sugar still running somewhat high.  Son at bedside.  They have been talking about fishing.  Talked with both the patient and his son.  Explained that I felt 3 weeks of IV antibiotic treatment from his most recent surgery was tentative treatment plan.  Explained I thought he might need LTAC or subacute rehab to complete his antimicrobial treatment.    Intake/Output Summary (Last 24 hours) at 7/7/2022 1710  Last data filed at 7/7/2022 1200  Gross per 24 hour   Intake 1650.65 ml   Output 850 ml   Net 800.65 ml     Allergies: No Known Allergies  Current Scheduled Medications:   atorvastatin, 20 mg, Oral, Nightly  cefepime, 2 g, Intravenous, Q8H  dilTIAZem CD, 240 mg, Oral, Q24H  famotidine, 40 mg, Oral, Daily  gabapentin, 300 mg, Oral, TID  levETIRAcetam, 500 mg, Oral, BID  lisinopril, 40 mg, Oral, Daily  senna-docusate sodium, 2 tablet, Oral, BID  sodium chloride, 10 mL, Intravenous, Q12H  sodium chloride, 10 mL, Intravenous, Q12H  sotalol, 120 mg, Oral, BID      Current PRN Medications:  •  acetaminophen **OR** acetaminophen **OR** acetaminophen  •  senna-docusate sodium **AND** polyethylene glycol **AND** bisacodyl **AND** bisacodyl  •  dextrose  •  dextrose  •  glucagon (human recombinant)  •  labetalol  •  ondansetron **OR** ondansetron  •  oxyCODONE-acetaminophen  •  oxyCODONE-acetaminophen  •  sodium chloride  •  sodium chloride    Review of Systems see HPI    Vital Signs:  /60 (BP Location: Left arm, Patient Position: Lying)   Pulse 75   Temp 98.7 °F (37.1 °C) (Oral)   Resp 18   Ht 177.8 cm (70\")   Wt 114 kg (250 lb 12.8 oz)   SpO2 100%   BMI 35.99 kg/m²     Physical " Exam  Vital signs - reviewed.  Line/IV site - No erythema, warmth, induration, or tenderness.  Neuro exam nonfocal  Right frontoparietal dressing clean and dry    Lab Results:  CBC:   Results from last 7 days   Lab Units 07/06/22  0740 07/05/22  0754 07/03/22  0324 07/02/22  0546 07/01/22  0556   WBC 10*3/mm3 8.48 11.26* 10.67 13.63* 10.28   HEMOGLOBIN g/dL 10.3* 10.8* 10.5* 10.8* 9.4*   HEMATOCRIT % 32.5* 31.7* 32.5* 33.0* 30.5*   PLATELETS 10*3/mm3 261 278 266 314 280     BMP:  Results from last 7 days   Lab Units 07/06/22  0740 07/05/22  0754 07/03/22  0324 07/02/22  0546 07/01/22  0556   SODIUM mmol/L 138 136 137 135* 141   POTASSIUM mmol/L 3.8 4.8 4.3 4.4 3.9   CHLORIDE mmol/L 103 101 102 99 106   CO2 mmol/L 26.0 22.0 28.0 22.0 28.0   BUN mg/dL 9 11 12 8 8   CREATININE mg/dL 0.77 1.39* 0.86 0.71* 0.77   GLUCOSE mg/dL 102* 226* 182* 228* 140*   CALCIUM mg/dL 8.7 8.6 8.1* 8.4* 8.8   ALT (SGPT) U/L 16 18  --   --  37     Culture Results:   Blood Culture   Date Value Ref Range Status   06/30/2022 No growth at 5 days  Final   06/30/2022 No growth at 5 days  Final     Urine Culture   Date Value Ref Range Status   07/05/2022 No growth  Final   06/30/2022 <10,000 CFU/mL Gram Positive Cocci (A)  Final     Comment:     Call if further workup needed.       Wound Culture   Date Value Ref Range Status   07/01/2022 Scant growth (1+) Serratia marcescens (A)  Final     Radiology: None  Additional Studies Reviewed: None    Impression:   1.  Recent craniotomy for meningioma  2.  Subgaleal/epidural infection-Serratia  3.  Impetigo operative/medial area-improved  4.  Diabetes mellitus  5.  Coronary artery disease    Recommendations:   Continue cefepime  Continue supportive care  Tentative stop date for cefepime July 22, 2022  Continue to follow  Will see again Sunday or Monday  Please call in interim if new problems or additional questions for infectious diseases    Edwin Jeffers MD

## 2022-07-07 NOTE — PLAN OF CARE
Goal Outcome Evaluation:  Plan of Care Reviewed With: patient           Outcome Evaluation: OT tx complete. Pt alert and agreeable to session, however upon entry to room, pt in Afib. D/t increased HR, all activities completed in bed. Pt SBA with bed mobility requiring assist to pull up into sitting. Pt sat EOB for session, with minimal dizziness upon sitting. Pt adjusted socks sitting EOB with no LOB. Pt completed dynamic reaching exercises sitting EOB to improve balance for ADL, IADL, and safety. Pt reports feeling depressed and questioning why he still has to be in the hospital. Encouragement and education provided. Continue OT POC.

## 2022-07-07 NOTE — THERAPY TREATMENT NOTE
Acute Care - Physical Therapy Treatment Note  [facility]     Patient Name: Elijah Escobar  : 1955  MRN: [id number]  Today's Date: 2022      Visit Dx:     ICD-10-CM ICD-9-CM   1. Postoperative infection, unspecified type, initial encounter  T81.40XA 998.59   2. S/P craniotomy  Z98.890 V45.89   3. Impaired mobility and ADLs  Z74.09 V49.89    Z78.9    4. Impaired mobility  Z74.09 799.89     Patient Active Problem List   Diagnosis   • Meningioma s/p craniotomy   • Hypertension   • GERD (gastroesophageal reflux disease)   • Coronary artery disease   • Morbid obesity (HCC)   • Type 2 diabetes mellitus with hyperglycemia and peripheral neuropathy, with long-term current use of insulin (HCC)   • Leukocytosis   • Other specified anemias   • Paroxysmal atrial fibrillation with rapid ventricular response (HCC)   • Atrial fibrillation with RVR (HCC)   • Post-operative infection   • S/P craniotomy     Past Medical History:   Diagnosis Date   • Brain tumor (HCC)    • Coronary artery disease    • COVID-19 vaccine series completed     MODERNA X 3; LAST DOSE 3/2022   • Diabetes (HCC)    • Erectile dysfunction    • GERD (gastroesophageal reflux disease)    • Hypercholesteremia    • Hypertension      Past Surgical History:   Procedure Laterality Date   • BALLOON ANGIOPLASTY, ARTERY Right    • COLONOSCOPY     • CRANIECTOMY Right 2022    Procedure: CRANIECTOMY WITH INCISIONAL WASHOUT;  Surgeon: Felipe Banerjee MD;  Location: [facility] OR;  Service: Neurosurgery;  Laterality: Right;   • CRANIOTOMY FOR TUMOR Right 2022    Procedure: CRANIOTOMY FOR TUMOR STERIOTACTIC WITH BRAIN LAB, right;  Surgeon: Flaco Portillo MD;  Location: [facility] OR;  Service: Neurosurgery;  Laterality: Right;     PT Assessment (last 12 hours)     PT Evaluation and Treatment     Row Name 22 1402 22 0900       Physical Therapy Time and Intention    Subjective Information complains of;weakness;fatigue;dizziness  -CHRISTIANO --     Document Type therapy note (daily note)  -CHRISTIANO --    Mode of Treatment physical therapy  -CHRISTIANO --    Comment, Session Not Performed -- pt's HR increasing to the 120's-130's at rest, will check back  -CHRISTIANO    Comment pt still has some confusion, not understanding medical situation, having to redirect pt frequently  -CHRISTIANO --    Row Name 07/07/22 1402          General Information    Existing Precautions/Restrictions fall  helmet when OOB  -CHRISTIANO     Row Name 07/07/22 1402          Pain    Pretreatment Pain Rating 4/10  -CHRISTIANO     Posttreatment Pain Rating 4/10  -CHRISTIANO     Pain Location - Side/Orientation Bilateral  -CHRISTIANO     Pain Location lower  -CHRISTIANO     Pain Location - extremity  -CHRISTIANO     Pre/Posttreatment Pain Comment neuropthy pain  -CHRISTIANO     Pain Intervention(s) Repositioned  -CHRISTIANO     Row Name 07/07/22 1402          Bed Mobility    Supine-Sit [place] (Bed Mobility) --  pt on the toilet in the room  -CHRISTIANO     Sit-Supine [place] (Bed Mobility) verbal cues;standby assist  -CHRISTIANO     Row Name 07/07/22 1402          Transfers    Sit-Stand [place] (Transfers) verbal cues;contact guard  -CHRISTIANO     Stand-Sit [place] (Transfers) verbal cues;contact guard  -CHRISTIANO     Row Name 07/07/22 1402          Gait/Stairs (Locomotion)    [place] Level (Gait) verbal cues;minimum assist (75% patient effort)  -CHRISTIANO     Assistive Device (Gait) cane, straight  -CHRISTIANO     Distance in Feet (Gait) 15  -CHRISTIANO     Comment, (Gait/Stairs) pt only amb around the bed.  c/o fatigue and weakness.  Encouraged pt to attempt more, but he refused.  Repeatedly stated that he had to lay down  -CHRISTIANO     Row Name 07/07/22 1402          Motor Skills    Therapeutic Exercise aerobic  -CHRISTIANO     Row Name 07/07/22 1402          Aerobic Exercise    Comment, Aerobic Exercise (Therapeutic Exercise) sitting EOB, AROM BLE X 15  -CHRISTIANO     Row Name             Wound 06/16/22 0951 Right anterior scalp Incision    Wound - Properties Group Placement Date: 06/16/22  -JBA Placement Time: 0951  -JBA  Side: Right  -JBA Orientation: anterior  -JBA Location: scalp  -JBA Primary Wound Type: Incision  -JBA     Retired Wound - Properties Group Placement Date: 06/16/22  -JBA Placement Time: 0951 -JBA Side: Right  -JBA Orientation: anterior  -JBA Location: scalp  -JBA Primary Wound Type: Incision  -JBA     Retired Wound - Properties Group Date first assessed: 06/16/22  -JBA Time first assessed: 0951 -JBA Side: Right  -JBA Location: scalp  -JBA Primary Wound Type: Incision  -JBA     Row Name 07/07/22 1402          Positioning and Restraints    Pre-Treatment Position in bed  -CHRISTIANO     Post Treatment Position bed  -CHRISTIANO     In Bed supine;call light within reach;encouraged to call for assist;side rails up x2  -CHRISTIANO           User Key  (r) = Recorded By, (t) = Taken By, (c) = Cosigned By    Initials Name Provider Type    Александр Morley PTA Physical Therapist Assistant    Ricky Watts, RN Registered Nurse                Physical Therapy Education                 Title: PT OT SLP Therapies (In Progress)     Topic: Physical Therapy (In Progress)     Point: Mobility training (In Progress)     Learning Progress Summary           Patient Acceptance, E, NR by CHRISTIANO at 7/7/2022 1402    Comment: safety, amb, helmet on when up    Acceptance, E, NR by CHRISTIANO at 7/6/2022 1104    Comment: safety with transfers and amb, wearing helmet when up    Acceptance, E,TB, VU,NR by  at 7/5/2022 2250    Comment: neadrián called on the phone and also received education.    Acceptance, E, VU by  at 7/4/2022 1152    Comment: safety, POC   Family Acceptance, E,TB, VU,NR by  at 7/5/2022 2250    Comment: neadrián called on the phone and also received education.                   Point: Home exercise program (Done)     Learning Progress Summary           Patient Acceptance, E,TB, VU,NR by  at 7/5/2022 2250    Comment: neadrián called on the phone and also received education.   Family Acceptance, E,TB, VU,NR by  at 7/5/2022 2250    Comment: neice called  on the phone and also received education.                   Point: Body mechanics (Done)     Learning Progress Summary           Patient Acceptance, E,TB, VU,NR by  at 7/5/2022 2250    Comment: connie called on the phone and also received education.   Family Acceptance, E,TB, VU,NR by  at 7/5/2022 2250    Comment: connie called on the phone and also received education.                   Point: Precautions (Done)     Learning Progress Summary           Patient Acceptance, E,TB, VU,NR by  at 7/5/2022 2250    Comment: connie called on the phone and also received education.   Family Acceptance, E,TB, VU,NR by SF at 7/5/2022 2250    Comment: connie called on the phone and also received education.                               User Key     Initials Effective Dates Name Provider Type Discipline     06/16/21 -  Prabha Singh, PT DPT Physical Therapist PT    CHRISTIANO 12/08/16 -  Александр Saravia, PTA Physical Therapist Assistant PT     06/16/21 -  Arlen Hansen, RN Registered Nurse Nurse              PT Recommendation and Plan     Plan of Care Reviewed With: patient  Progress: no change  Outcome Evaluation: Pt was up using the bathroom.  Able to transfer sit to stand with CGA.  Amb 15' with straight cane and CGA.  Encouraged pt to attempt to amb further, but pt declined.  He stated he felt too fatigued and weak.  Pt seemed confused to medical situation and being in the hospital.  He needed  redirection while attempting to walk.  Will continue to work with pt to increase strength and progress amb.   Outcome Measures     Row Name 07/07/22 1402 07/06/22 1104          How much help from another person do you currently need...    Turning from your back to your side while in flat bed without using bedrails? 3  -CHRISTIANO 3  -CHRISTIANO     Moving from lying on back to sitting on the side of a flat bed without bedrails? 3  -CHRISTIANO 3  -CHRISTIANO     Moving to and from a bed to a chair (including a wheelchair)? 3  -CHRISTIANO 3  -CHRISTIANO     Standing up from a  chair using your arms (e.g., wheelchair, bedside chair)? 3  -CHRISTIANO 3  -CHRISTIANO     Climbing 3-5 steps with a railing? 2  -CHRISTIANO 2  -CHRISTIANO     To walk in hospital room? 3  -CHRISTIANO 3  -CHRISTIANO     AM-PAC 6 Clicks Score (PT) 17  -CHRISTIANO 17  -CHRISTIANO            Functional Assessment    Outcome Measure Options AM-PAC 6 Clicks Basic Mobility (PT)  -CHRISTIANO AM-PAC 6 Clicks Basic Mobility (PT)  -CHRISTIANO           User Key  (r) = Recorded By, (t) = Taken By, (c) = Cosigned By    Initials Name Provider Type    Александр Morley PTA Physical Therapist Assistant                 Time Calculation:    PT Charges     Row Name 07/07/22 1402             Time Calculation    Start Time 1402  -CHRISTIANO      Stop Time 1427  -CHRISTIANO      Time Calculation (min) 25 min  -CHRISTIANO      PT Received On 07/07/22  -CHRISTIANO              Time Calculation- PT    Total Timed Code Minutes- PT 25 minute(s)  -CHRISTIANO              Timed Charges    38546 - PT Therapeutic Exercise Minutes 10  -CHRISTIANO      91424 - Gait Training Minutes  15  -CHRISTIANO              Total Minutes    Timed Charges Total Minutes 25  -CHRISTIANO       Total Minutes 25  -CHRISTIANO            User Key  (r) = Recorded By, (t) = Taken By, (c) = Cosigned By    Initials Name Provider Type    Александр Morley PTA Physical Therapist Assistant              Therapy Charges for Today     Code Description Service Date Service Provider Modifiers Qty    10279653033 HC GAIT TRAINING EA 15 MIN 7/6/2022 Александр Saravia PTA GP 2    60363998590 HC GAIT TRAINING EA 15 MIN 7/7/2022 Александр Saravia PTA GP 1    13335089649 HC PT THER PROC EA 15 MIN 7/7/2022 Александр Saravia PTA GP 1          PT G-Codes  Outcome Measure Options: AM-PAC 6 Clicks Basic Mobility (PT)  AM-PAC 6 Clicks Score (PT): 17  AM-PAC 6 Clicks Score (OT): 16    Александр Saravia PTA  7/7/2022

## 2022-07-08 LAB
GLUCOSE BLDC GLUCOMTR-MCNC: 190 MG/DL (ref 70–130)
GLUCOSE BLDC GLUCOMTR-MCNC: 202 MG/DL (ref 70–130)
GLUCOSE BLDC GLUCOMTR-MCNC: 203 MG/DL (ref 70–130)
GLUCOSE BLDC GLUCOMTR-MCNC: 226 MG/DL (ref 70–130)
GLUCOSE BLDC GLUCOMTR-MCNC: 254 MG/DL (ref 70–130)

## 2022-07-08 PROCEDURE — 63710000001 INSULIN LISPRO (HUMAN) PER 5 UNITS: Performed by: NURSE PRACTITIONER

## 2022-07-08 PROCEDURE — 97110 THERAPEUTIC EXERCISES: CPT

## 2022-07-08 PROCEDURE — 97116 GAIT TRAINING THERAPY: CPT

## 2022-07-08 PROCEDURE — 05HY33Z INSERTION OF INFUSION DEVICE INTO UPPER VEIN, PERCUTANEOUS APPROACH: ICD-10-PCS | Performed by: NEUROLOGICAL SURGERY

## 2022-07-08 PROCEDURE — 63710000001 INSULIN DETEMIR PER 5 UNITS: Performed by: NURSE PRACTITIONER

## 2022-07-08 PROCEDURE — 25010000002 CEFEPIME PER 500 MG: Performed by: NURSE PRACTITIONER

## 2022-07-08 PROCEDURE — 82962 GLUCOSE BLOOD TEST: CPT

## 2022-07-08 PROCEDURE — 99024 POSTOP FOLLOW-UP VISIT: CPT | Performed by: NURSE PRACTITIONER

## 2022-07-08 PROCEDURE — 97535 SELF CARE MNGMENT TRAINING: CPT | Performed by: OCCUPATIONAL THERAPIST

## 2022-07-08 RX ORDER — ISOSORBIDE MONONITRATE 30 MG/1
30 TABLET, EXTENDED RELEASE ORAL
Status: DISCONTINUED | OUTPATIENT
Start: 2022-07-08 | End: 2022-07-09

## 2022-07-08 RX ORDER — INSULIN LISPRO 100 [IU]/ML
0-14 INJECTION, SOLUTION INTRAVENOUS; SUBCUTANEOUS
Status: DISCONTINUED | OUTPATIENT
Start: 2022-07-08 | End: 2022-07-11 | Stop reason: HOSPADM

## 2022-07-08 RX ORDER — DEXTROSE MONOHYDRATE 25 G/50ML
25 INJECTION, SOLUTION INTRAVENOUS
Status: DISCONTINUED | OUTPATIENT
Start: 2022-07-08 | End: 2022-07-11 | Stop reason: HOSPADM

## 2022-07-08 RX ORDER — NICOTINE POLACRILEX 4 MG
15 LOZENGE BUCCAL
Status: DISCONTINUED | OUTPATIENT
Start: 2022-07-08 | End: 2022-07-11 | Stop reason: HOSPADM

## 2022-07-08 RX ORDER — AMLODIPINE BESYLATE 10 MG/1
10 TABLET ORAL
Status: DISCONTINUED | OUTPATIENT
Start: 2022-07-08 | End: 2022-07-11 | Stop reason: HOSPADM

## 2022-07-08 RX ADMIN — OXYCODONE AND ACETAMINOPHEN 1 TABLET: 325; 10 TABLET ORAL at 01:59

## 2022-07-08 RX ADMIN — INSULIN LISPRO 8 UNITS: 100 INJECTION, SOLUTION INTRAVENOUS; SUBCUTANEOUS at 12:32

## 2022-07-08 RX ADMIN — CEFEPIME HYDROCHLORIDE 2 G: 2 INJECTION, POWDER, FOR SOLUTION INTRAVENOUS at 21:28

## 2022-07-08 RX ADMIN — Medication 10 ML: at 08:12

## 2022-07-08 RX ADMIN — GABAPENTIN 300 MG: 300 CAPSULE ORAL at 21:28

## 2022-07-08 RX ADMIN — AMLODIPINE BESYLATE 10 MG: 10 TABLET ORAL at 16:01

## 2022-07-08 RX ADMIN — INSULIN LISPRO 3 UNITS: 100 INJECTION, SOLUTION INTRAVENOUS; SUBCUTANEOUS at 21:25

## 2022-07-08 RX ADMIN — SODIUM CHLORIDE 2.5 MG/HR: 9 INJECTION, SOLUTION INTRAVENOUS at 19:20

## 2022-07-08 RX ADMIN — CEFEPIME HYDROCHLORIDE 2 G: 2 INJECTION, POWDER, FOR SOLUTION INTRAVENOUS at 06:03

## 2022-07-08 RX ADMIN — LABETALOL HYDROCHLORIDE 10 MG: 5 INJECTION INTRAVENOUS at 10:25

## 2022-07-08 RX ADMIN — FAMOTIDINE 40 MG: 20 TABLET, FILM COATED ORAL at 08:09

## 2022-07-08 RX ADMIN — Medication 10 ML: at 08:16

## 2022-07-08 RX ADMIN — INSULIN DETEMIR 20 UNITS: 100 INJECTION, SOLUTION SUBCUTANEOUS at 21:49

## 2022-07-08 RX ADMIN — ISOSORBIDE MONONITRATE 30 MG: 30 TABLET, EXTENDED RELEASE ORAL at 16:01

## 2022-07-08 RX ADMIN — LEVETIRACETAM 500 MG: 500 TABLET, FILM COATED ORAL at 08:09

## 2022-07-08 RX ADMIN — LEVETIRACETAM 500 MG: 500 TABLET, FILM COATED ORAL at 21:27

## 2022-07-08 RX ADMIN — SODIUM CHLORIDE 5 MG/HR: 9 INJECTION, SOLUTION INTRAVENOUS at 03:11

## 2022-07-08 RX ADMIN — OXYCODONE AND ACETAMINOPHEN 1 TABLET: 325; 10 TABLET ORAL at 10:15

## 2022-07-08 RX ADMIN — SODIUM CHLORIDE 7.5 MG/HR: 9 INJECTION, SOLUTION INTRAVENOUS at 08:12

## 2022-07-08 RX ADMIN — ATORVASTATIN CALCIUM 20 MG: 10 TABLET, FILM COATED ORAL at 21:27

## 2022-07-08 RX ADMIN — INSULIN LISPRO 5 UNITS: 100 INJECTION, SOLUTION INTRAVENOUS; SUBCUTANEOUS at 18:30

## 2022-07-08 RX ADMIN — DILTIAZEM HYDROCHLORIDE 240 MG: 240 CAPSULE, EXTENDED RELEASE ORAL at 08:09

## 2022-07-08 RX ADMIN — GABAPENTIN 300 MG: 300 CAPSULE ORAL at 15:14

## 2022-07-08 RX ADMIN — SODIUM CHLORIDE 10 MG/HR: 9 INJECTION, SOLUTION INTRAVENOUS at 14:49

## 2022-07-08 RX ADMIN — CEFEPIME HYDROCHLORIDE 2 G: 2 INJECTION, POWDER, FOR SOLUTION INTRAVENOUS at 15:17

## 2022-07-08 RX ADMIN — DOCUSATE SODIUM 50 MG AND SENNOSIDES 8.6 MG 2 TABLET: 8.6; 5 TABLET, FILM COATED ORAL at 08:09

## 2022-07-08 RX ADMIN — GABAPENTIN 300 MG: 300 CAPSULE ORAL at 08:09

## 2022-07-08 RX ADMIN — LISINOPRIL 40 MG: 20 TABLET ORAL at 08:09

## 2022-07-08 RX ADMIN — OXYCODONE AND ACETAMINOPHEN 1 TABLET: 325; 10 TABLET ORAL at 21:27

## 2022-07-08 RX ADMIN — SODIUM CHLORIDE 7.5 MG/HR: 9 INJECTION, SOLUTION INTRAVENOUS at 11:40

## 2022-07-08 RX ADMIN — Medication 10 ML: at 21:29

## 2022-07-08 RX ADMIN — OXYCODONE AND ACETAMINOPHEN 1 TABLET: 325; 10 TABLET ORAL at 16:56

## 2022-07-08 RX ADMIN — DOCUSATE SODIUM 50 MG AND SENNOSIDES 8.6 MG 2 TABLET: 8.6; 5 TABLET, FILM COATED ORAL at 21:27

## 2022-07-08 NOTE — PLAN OF CARE
Goal Outcome Evaluation:  Plan of Care Reviewed With: patient           Outcome Evaluation: OT tx complete. Pt with min A for bed mobility, and sat EOB with no balance deficits. Pt doffed/donned socks sitting EOB with extended time with CGA -SBA for balance.  Pt completed sit<>stand with CGA with posterior lean in standing. Pt completed fxl mobility in room with CGA-min A for balance and use of straight cane to improve safety and I with ADLs and IADLs, becoming incontinent of urine with ambulation. Pt with vcs provided and used urinal standing supported. Pt with 2x LOB posteriorly with ambulation. Pt seated in chair and new gown donned d/t lines/IV following episode of incontinence. Continue OT POC.

## 2022-07-08 NOTE — CASE MANAGEMENT/SOCIAL WORK
Continued Stay Note  [facility]     Patient Name: Elijah Escobar  MRN: [id number]  Today's Date: 7/8/2022    Admit Date: 6/30/2022     Discharge Plan     Row Name 07/08/22 1022       Plan    Plan LTAC referral    Patient/Family in Agreement with Plan yes    Provided Post Acute Provider List? Yes    Post Acute Provider List Long Term Acute Care    Provided Post Acute Provider Quality & Resource List? Yes    Post Acute Provider Quality and Resource List Long Term Acute Care    Delivered To Support Person    Support Person Sister - Brina Howard    Method of Delivery Telephone    Plan Comments LTAC referral received.  SW spoke with patient's sister, Brina Howard, regarding proceeding with LTAC referral/insurance precert and she was in agreement.  LTAC referral made, will await decision from patient's insurance.               Discharge Codes    No documentation.               Expected Discharge Date and Time     Expected Discharge Date Expected Discharge Time    Jul 7, 2022             TIANA Syed

## 2022-07-08 NOTE — PLAN OF CARE
Goal Outcome Evaluation:         Patient consistently c/o fatigue. Stands and sits with CGA. Ambulated 140' with min assist. Decreased step length, decreased weight shifting. Worked on safe use of cane and holding head up. Worked thru standing balance acctivities. Will benefit from strengthening activities.

## 2022-07-08 NOTE — PROGRESS NOTES
[facility]  INPATIENT PROGRESS NOTE    Patient Name: Elijah Escobar  Date of Admission: 6/30/2022  Today's Date: 07/08/22  Length of Stay: 8  Primary Care Physician: Dylan Lama APRN    Subjective   Chief Complaint: elevated blood sugar  HPI   7/4 Resting in bed, appears tired and mildly ill. Does not offer any new complaints.  7/5 Patient transferred to critical care for closer neurological observation and blood sugar control.   7/6 No new complaints. Blood sugar 100-120 range on insulin drip and NPO. HR is 70 with some PAC and long QT ?AV block. Sotalol on hold since last night.   7/7 No new complaints. -160 sitting up Afib. Still NPO.  7/8 HR is controlled today. Cardizem was not required. Sotalol with good effect watching QT.     Review of Systems   All pertinent negatives and positives are as above. All other systems have been reviewed and are negative unless otherwise stated.     Objective    Temp:  [98 °F (36.7 °C)-98.8 °F (37.1 °C)] 98.3 °F (36.8 °C)  Heart Rate:  [] 73  Resp:  [12-19] 19  BP: (101-180)/() 174/66  Physical Exam  GEN: Awake, alert, interactive, in NAD  HEENT:   Head is bandaged.  EOMI, Anicteric, Trachea midline  Lungs:  no wheezing/rales/rhonchi  Heart: irreg/irreg tachycardic, +S1/s2, no rub  ABD: soft, nt/nd, +BS, no guarding/rebound  Extremities: atraumatic, no cyanosis  Skin: no rashes or lesions  Neuro: AAOx3, DENISE  Psych: normal mood & affect    Results Review:  I have reviewed the labs, radiology results, and diagnostic studies.    Laboratory Data:   Results from last 7 days   Lab Units 07/06/22  0740 07/05/22  0754 07/03/22  0324   WBC 10*3/mm3 8.48 11.26* 10.67   HEMOGLOBIN g/dL 10.3* 10.8* 10.5*   HEMATOCRIT % 32.5* 31.7* 32.5*   PLATELETS 10*3/mm3 261 278 266        Results from last 7 days   Lab Units 07/06/22  0740 07/05/22  0754 07/03/22  0324   SODIUM mmol/L 138 136 137   POTASSIUM mmol/L 3.8 4.8  4.3   CHLORIDE mmol/L 103 101 102   CO2 mmol/L 26.0 22.0 28.0   BUN mg/dL 9 11 12   CREATININE mg/dL 0.77 1.39* 0.86   CALCIUM mg/dL 8.7 8.6 8.1*   BILIRUBIN mg/dL 0.5 0.5  --    ALK PHOS U/L 100 103  --    ALT (SGPT) U/L 16 18  --    AST (SGOT) U/L 11 12  --    GLUCOSE mg/dL 102* 226* 182*       Culture Data:   Blood Culture   Date Value Ref Range Status   06/30/2022 No growth at 3 days  Preliminary   06/30/2022 No growth at 3 days  Preliminary     Urine Culture   Date Value Ref Range Status   06/30/2022 <10,000 CFU/mL Gram Positive Cocci (A)  Final     Comment:     Call if further workup needed.       Wound Culture   Date Value Ref Range Status   07/01/2022 Scant growth (1+) Serratia marcescens (A)  Final       Radiology Data:   Imaging Results (Last 24 Hours)     ** No results found for the last 24 hours. **          I have reviewed the patient's current medications.     Assessment/Plan     Active Hospital Problems    Diagnosis    • Post-operative infection    • S/P craniotomy      Added automatically from request for surgery [id number]     • Paroxysmal atrial fibrillation with rapid ventricular response (HCC)    • Coronary artery disease    • Type 2 diabetes mellitus with hyperglycemia and peripheral neuropathy, with long-term current use of insulin (HCC)    • Hypertension    • GERD (gastroesophageal reflux disease)        Plan:  Status post craniotomy > Per neurosurgery  Suspected epidural infection > ID input noted  DM uncontrolled > Levemir to 25 units BID d/c  Humalog sliding scale d/c  Humulin R drip started 7/5    PAF > Off Cardizem drip to Sotalol > held due to QT 7/6  Sotalol 120 at 830 AM 7/7  If no rate control in the next hour resume Cardizem drip and stop Cardene  EKG 7/6 noted  Check Magnesium/Phosphorus prn    Nutrition> Carb consistent diet restarted    Will continue to follow    Discharge Planning: Per attending.    Electronically signed by Angel Sarabia MD, 07/08/22, 08:46 CDT.

## 2022-07-08 NOTE — PLAN OF CARE
Goal Outcome Evaluation:  Plan of Care Reviewed With: other (see comments)        Progress: improving  Outcome Evaluation: Ntn follow up. Diet advanced to a C.CHO, cardiac diet yesterday evening. He has consumed 100% of 1 meal today. Cont to show weight loss. Has R scalp incision s/p craniotomy. Cont to follow.

## 2022-07-08 NOTE — PROGRESS NOTES
Daily Progress Note    ASSESSMENT:   Elijah Escobar is a 66 y.o. male with a significant medical history of recent craniotomy for tumor, 6/16/2022, coronary artery disease, A. fib, diabetes, erectile dysfunction, GERD, hypertension, hyperlipidemia. He presents with a new problem of a persistent right sided headache, particularly over the right temporal region and dizziness with standing.  Physical exam findings of tense but fluctuant area of edema from the mid frontal to right parietal region of the scalp, the area is tender to palpation, periorbital lesions with purulent drainage from the left eye, scalp incision is clean, dry, and intact, bright and awake, oriented x3, speech is clear, follows commands without prompting showing thumbs up and 2 fingers bilaterally, no pronator drift, left dysmetria, gross hyporeflexia, and bilateral Babinski.  CT of the head from 6/23/2022, showing expected postsurgical changes to the right frontal lobe, small amount of fluid collection and pneumocephalus in the right subdural space, small amount of vasogenic edema with 4.3 mm left-to-right midline shift.  Repeat CT of the head obtained 6/30/2022 shows no acute intracranial blood products, unresolved vasogenic edema, midline shift resolved, increased left frontal and temporal soft tissue swelling.  MRI of the brain from 6/30/2022 shows a 7.9 x 6.5 x 0.9 cm rim-enhancing fluid collection overlying the bone flap concerning for pseudomeningocele versus infection.    Past Medical History:   Diagnosis Date   • Brain tumor (HCC)    • Coronary artery disease    • COVID-19 vaccine series completed     MODERNA X 3; LAST DOSE 3/2022   • Diabetes (HCC)    • Erectile dysfunction    • GERD (gastroesophageal reflux disease)    • Hypercholesteremia    • Hypertension      Active Hospital Problems    Diagnosis    • Post-operative infection    • S/P craniotomy      Added automatically from request for surgery [id number]     • Paroxysmal atrial  "fibrillation with rapid ventricular response (HCC)    • Coronary artery disease    • Type 2 diabetes mellitus with hyperglycemia and peripheral neuropathy, with long-term current use of insulin (HCC)    • Hypertension    • GERD (gastroesophageal reflux disease)      CHIEF COMPLAINT:  Right sided headache and intermittent dizziness     PLAN:   Neuro: Stable.  Neuro at baseline at present.  Bright awake.  Oriented x3   7 Days Post-Op (7/1/2022) I&D and washout and craniectomy   Wound culture positive for Serratia   Continue Kera   Keep ICU for close neurologic monitoring and insulin drip      CV: Continuous cardiac monitoring.  PICC placed to left upper extremity.     Intermittent use of Cardene to keep SBP <140.   Hospitalist to adjust blood pressure medications to discontinue Cardene.  Appreciate assist  Pulm: Continuous pulse oximetry.  O2.  Maintaining O2 sat.  : Voiding.  Bladder scans and I/O cath per policy.  Trace leuks, 6-12 WBCs.  Urine culture pending.  FEN: Tolerating carb consistent diet.    GI: No acute issues  ENDO: Insulin drip discontinued.  Accu-Cheks per policy.   Diabetic educator consulted.   Will adjust insulin requirements based on Glucomander averages.  Discussed with pharmacist.  ID: [id number].  Wound culture positive for Serratia.  Continue cefepime per ID.  Weekly CRP and ESR.  CRP 5.93.  ESR 80.  Heme:  DVT prophylaxis, SCDs  Pain: Tolerable at present  Dispo: PT/OT.  Strict use of cranial helmet while out of bed     Subjective  Symptom stable.  Doing well    Temp:  [98 °F (36.7 °C)-98.8 °F (37.1 °C)] 98.3 °F (36.8 °C)  Heart Rate:  [] 76  Resp:  [12-19] 19  BP: (101-180)/() 130/62    Objective:  General Appearance:  Well-appearing and in no acute distress.    Vital signs: (most recent): Blood pressure 130/62, pulse 76, temperature 98.3 °F (36.8 °C), temperature source Oral, resp. rate 19, height 177.8 cm (70\"), weight 112 kg (246 lb 4.8 oz), SpO2 100 %.      Neurologic Exam "     Mental Status   Oriented to person, place, and time.   Attention: normal. Concentration: normal.   Speech: speech is normal   Level of consciousness: alert    Bright and awake.  Oriented x3.  Follows commands without prompting showing thumbs up and 2 fingers bilaterally.     Cranial Nerves     CN II   Visual fields full to confrontation.     CN III, IV, VI   Pupils are equal, round, and reactive to light.  Extraocular motions are normal.     CN V   Facial sensation intact.     CN VII   Facial expression full, symmetric.     CN VIII   CN VIII normal.     CN IX, X   CN IX normal.     CN XI   CN XI normal.     Motor Exam   Right arm tone: normal  Left arm tone: normal  Right leg tone: normal  Left leg tone: normal    Strength   Right deltoid: 5/5  Left deltoid: 5/5  Right biceps: 5/5  Left biceps: 5/5  Right triceps: 5/5  Left triceps: 5/5  Right wrist extension: 5/5  Left wrist extension: 5/5  Right iliopsoas: 5/5  Left iliopsoas: 5/5  Right quadriceps: 5/5  Left quadriceps: 5/5  Right anterior tibial: 5/5  Left anterior tibial: 5/5  Right gastroc: 5/5  Left gastroc: 5/5  Right EHL 5/5  Left EHL 5/5       Sensory Exam   Right arm light touch: normal  Left arm light touch: normal  Right leg light touch: normal  Left leg light touch: normal    Gait, Coordination, and Reflexes     Tremor   Resting tremor: absent  Intention tremor: absent  Action tremor: absent    DRAINS:   [REMOVED] Closed/Suction Drain 1 Scalp Bulb (Removed)   Site Description Unable to view 06/19/22 0735   Dressing Status Clean;Dry;Intact 06/19/22 0735   Drainage Appearance Serosanguineous 06/19/22 0735   Status To bulb suction 06/19/22 0735   Output (mL) 5 mL 06/19/22 0723       [REMOVED] Urethral Catheter Non-latex;Silicone 16 Fr. (Removed)   Daily Indications Selected surgeries ( tract, abdomen) 06/16/22 1339   Site Assessment Clean;Skin intact 06/16/22 1339   Collection Container Standard drainage bag 06/16/22 1339   Securement Method  Securing device 06/16/22 1339   Output (mL) 1100 mL 06/16/22 1233     LAB RESULTS:  ABG:     CBC:   Results from last 7 days   Lab Units 07/06/22  0740 07/05/22  0754 07/03/22  0324 07/02/22  0546   WBC 10*3/mm3 8.48 11.26* 10.67 13.63*   HEMOGLOBIN g/dL 10.3* 10.8* 10.5* 10.8*   HEMATOCRIT % 32.5* 31.7* 32.5* 33.0*   PLATELETS 10*3/mm3 261 278 266 314     BMP:  Results from last 7 days   Lab Units 07/06/22  0740 07/05/22  0754 07/03/22 0324 07/02/22  0546   SODIUM mmol/L 138 136 137 135*   POTASSIUM mmol/L 3.8 4.8 4.3 4.4   CHLORIDE mmol/L 103 101 102 99   CO2 mmol/L 26.0 22.0 28.0 22.0   BUN mg/dL 9 11 12 8   CREATININE mg/dL 0.77 1.39* 0.86 0.71*   GLUCOSE mg/dL 102* 226* 182* 228*   CALCIUM mg/dL 8.7 8.6 8.1* 8.4*   ALT (SGPT) U/L 16 18  --   --      Culture Results:   Blood Culture   Date Value Ref Range Status   06/30/2022 No growth at 4 days  Preliminary   06/30/2022 No growth at 4 days  Preliminary     Urine Culture   Date Value Ref Range Status   06/30/2022 <10,000 CFU/mL Gram Positive Cocci (A)  Final     Comment:     Call if further workup needed.       Wound Culture   Date Value Ref Range Status   07/01/2022 Scant growth (1+) Serratia marcescens (A)  Final     Imaging Results (Last 24 Hours)     ** No results found for the last 24 hours. **        Lab Results (last 24 hours)     Procedure Component Value Units Date/Time    POC Glucose Once [[id number]]  (Abnormal) Collected: 07/08/22 0602    Specimen: Blood Updated: 07/08/22 0625     Glucose 202 mg/dL      Comment: : [id number] Александр LoriMeter ID: [id number]       POC Glucose Once [[id number]]  (Abnormal) Collected: 07/08/22 0023    Specimen: Blood Updated: 07/08/22 0038     Glucose 203 mg/dL      Comment: : [id number] Александр LoriMeter ID: [id number]       POC Glucose Once [[id number]]  (Abnormal) Collected: 07/07/22 1809    Specimen: Blood Updated: 07/07/22 1822     Glucose 134 mg/dL      Comment: : [id number] Glen BilliMeter ID: [id number]        POC Glucose Once [[id number]]  (Abnormal) Collected: 07/07/22 1709    Specimen: Blood Updated: 07/07/22 1721     Glucose 145 mg/dL      Comment: : [id number] Blaine Handley (Gard)eter ID: [id number]       POC Glucose Once [[id number]]  (Abnormal) Collected: 07/07/22 1609    Specimen: Blood Updated: 07/07/22 1621     Glucose 166 mg/dL      Comment: : [id number] [place] BilliMeter ID: [id number]       POC Glucose Once [[id number]]  (Abnormal) Collected: 07/07/22 1505    Specimen: Blood Updated: 07/07/22 1527     Glucose 148 mg/dL      Comment: : [id number] Carroll BilliMeter ID: [id number]       POC Glucose Once [[id number]]  (Abnormal) Collected: 07/07/22 1404    Specimen: Blood Updated: 07/07/22 1415     Glucose 170 mg/dL      Comment: : [id number] Carroll BilliMeter ID: [id number]       POC Glucose Once [[id number]]  (Abnormal) Collected: 07/07/22 1143    Specimen: Blood Updated: 07/07/22 1154     Glucose 132 mg/dL      Comment: : [id number] [place] BilliMeter ID: [id number]       POC Glucose Once [[id number]]  (Abnormal) Collected: 07/07/22 1044    Specimen: Blood Updated: 07/07/22 1057     Glucose 132 mg/dL      Comment: : [id number] [place] BilliMeter ID: [id number]       POC Glucose Once [[id number]]  (Normal) Collected: 07/07/22 0943    Specimen: Blood Updated: 07/07/22 0954     Glucose 120 mg/dL      Comment: : [id number] [place] BilliMeter ID: [id number]           Rahul Arnold, APRN

## 2022-07-08 NOTE — THERAPY TREATMENT NOTE
Patient Name: Elijah Escobar  : 1955    MRN: [id number]                              Today's Date: 2022       Admit Date: 2022    Visit Dx:     ICD-10-CM ICD-9-CM   1. Postoperative infection, unspecified type, initial encounter  T81.40XA 998.59   2. S/P craniotomy  Z98.890 V45.89   3. Impaired mobility and ADLs  Z74.09 V49.89    Z78.9    4. Impaired mobility  Z74.09 799.89     Patient Active Problem List   Diagnosis   • Meningioma s/p craniotomy   • Hypertension   • GERD (gastroesophageal reflux disease)   • Coronary artery disease   • Morbid obesity (HCC)   • Type 2 diabetes mellitus with hyperglycemia and peripheral neuropathy, with long-term current use of insulin (HCC)   • Leukocytosis   • Other specified anemias   • Paroxysmal atrial fibrillation with rapid ventricular response (HCC)   • Atrial fibrillation with RVR (HCC)   • Post-operative infection   • S/P craniotomy     Past Medical History:   Diagnosis Date   • Brain tumor (HCC)    • Coronary artery disease    • COVID-19 vaccine series completed     MODERNA X 3; LAST DOSE 3/2022   • Diabetes (HCC)    • Erectile dysfunction    • GERD (gastroesophageal reflux disease)    • Hypercholesteremia    • Hypertension      Past Surgical History:   Procedure Laterality Date   • BALLOON ANGIOPLASTY, ARTERY Right    • COLONOSCOPY     • CRANIECTOMY Right 2022    Procedure: CRANIECTOMY WITH INCISIONAL WASHOUT;  Surgeon: Felipe Banerjee MD;  Location:  [facility] OR;  Service: Neurosurgery;  Laterality: Right;   • CRANIOTOMY FOR TUMOR Right 2022    Procedure: CRANIOTOMY FOR TUMOR STERIOTACTIC WITH BRAIN LAB, right;  Surgeon: Flaco Portillo MD;  Location:  [facility];  Service: Neurosurgery;  Laterality: Right;      General Information     Row Name 22 0823          OT Time and Intention    Document Type therapy note (daily note)  -MENDEL (r) DL (t) MENDEL (c)     Mode of Treatment occupational therapy  -MENDEL (r) DL (t) JTITI (c)     Row Name  07/08/22 0823          General Information    Patient Profile Reviewed yes  -JJ (r) AD (t) JJ (c)     Existing Precautions/Restrictions fall  helmet OOB  -JJ (r) AD (t) JJ (c)           User Key  (r) = Recorded By, (t) = Taken By, (c) = Cosigned By    Initials Name Provider Type    Coleen Martinez, OTR/L, CSRS Occupational Therapist    Sara Davis, OT Student OT Student                 Mobility/ADL's     Row Name 07/08/22 0823          Bed Mobility    Bed Mobility supine-sit  -JJ (r) AD (t) JJ (c)     Supine-Sit [place] (Bed Mobility) minimum assist (75% patient effort);verbal cues  -JJ (r) AD (t) JJ (c)     Assistive Device (Bed Mobility) head of bed elevated;bed rails  -JJ (r) AD (t) JJ (c)     Comment, (Bed Mobility) chair at EOS  -JJ (r) AD (t) JJ (c)     Row Name 07/08/22 0823          Transfers    Transfers sit-stand transfer;stand-sit transfer;bed-chair transfer  -JJ (r) AD (t) JJ (c)     Bed-Chair [place] (Transfers) contact guard;minimum assist (75% patient effort);verbal cues  -JJ (r) AD (t) JJ (c)     Assistive Device (Bed-Chair Transfers) cane, straight  -JJ (r) AD (t) JJ (c)     Sit-Stand [place] (Transfers) verbal cues;contact guard  -JJ (r) AD (t) JJ (c)     Stand-Sit [place] (Transfers) verbal cues;contact guard  -JJ (r) AD (t) JJ (c)     Row Name 07/08/22 0823          Functional Mobility    Functional Mobility- Ind. Level minimum assist (75% patient effort);contact guard assist  -JJ (r) AD (t) JJ (c)     Functional Mobility- Device cane, straight  -JJ (r) AD (t) JJ (c)     Functional Mobility- Comment Pt ambulated in room, becoming incontinent of urine with ambulation. Pt unsteady with narrow base of support. Vcs provided and pt able to correct. Pt with LOB posteriorly x2, self corrected.  -JJ (r) DL (t) MENDEL (c)     Row Name 07/08/22 0823          Activities of Daily Living    BADL Assessment/Intervention lower body dressing;toileting  -MENDEL ([place]) DL (t) MENDEL (c)     Row  Name 07/08/22 0823          Lower Body Dressing Assessment/Training    [place] Level (Lower Body Dressing) doff;don;socks;standby assist  -JJ (r) AD (t) JJ (c)     Position (Lower Body Dressing) edge of bed sitting  -JJ (r) AD (t) JJ (c)     Row Name 07/08/22 0823          Toileting Assessment/Training    [place] Level (Toileting) adjust/manage clothing;minimum assist (75% patient effort)  -JJ (r) AD (t) JJ (c)     Assistive Devices (Toileting) urinal  -JJ (r) AD (t) JJ (c)     Position (Toileting) supported standing  -JJ (r) AD (t) JJ (c)           User Key  (r) = Recorded By, (t) = Taken By, (c) = Cosigned By    Initials Name Provider Type    Coleen Martinez, OTR/L, CSRS Occupational Therapist    Sara Davis, OT Student OT Student               Obj/Interventions    No documentation.                Goals/Plan    No documentation.                Clinical Impression     Row Name 07/08/22 0823          Pain Assessment    Pretreatment Pain Rating 0/10 - no pain  -JJ (r) AD (t) JJ (c)     Posttreatment Pain Rating 0/10 - no pain  -JJ (r) AD (t) JJ (c)     Row Name 07/08/22 0823          Plan of Care Review    Plan of Care Reviewed With patient  -JJ (r) AD (t) JJ (c)     Outcome Evaluation OT tx complete. Pt with min A for bed mobility, and sat EOB with no balance deficits. Pt doffed/donned socks sitting EOB with extended time with CGA -SBA for balance.  Pt completed sit<>stand with CGA with posterior lean in standing. Pt completed fxl mobility in room with CGA-min A for balance and use of straight cane to improve safety and I with ADLs and IADLs, becoming incontinent of urine with ambulation. Pt with vcs provided and used urinal standing supported. Pt with 2x LOB posteriorly with ambulation. Pt seated in chair and new gown donned d/t lines/IV following episode of incontinence. Continue OT POC.  -JJ (r) AD (t) JJ (c)     Row Name 07/08/22 0823          Positioning and Restraints    Pre-Treatment  Position in bed  -JJ (r) AD (t) JJ (c)     Post Treatment Position chair  -JJ (r) AD (t) JJ (c)     In Chair reclined;call light within reach;encouraged to call for assist;exit alarm on;patient within staff view  -JJ (r) AD (t) JJ (c)           User Key  (r) = Recorded By, (t) = Taken By, (c) = Cosigned By    Initials Name Provider Type    Coleen Martinez, OTR/L, CSRS Occupational Therapist    Sara Davis, OT Student OT Student               Outcome Measures     Row Name 07/08/22 0823          How much help from another is currently needed...    Putting on and taking off regular lower body clothing? 3  -JJ (r) AD (t) JJ (c)     Bathing (including washing, rinsing, and drying) 2  -JJ (r) AD (t) JJ (c)     Toileting (which includes using toilet bed pan or urinal) 3  -JJ (r) AD (t) JJ (c)     Putting on and taking off regular upper body clothing 3  -JJ (r) AD (t) JJ (c)     Taking care of personal grooming (such as brushing teeth) 3  -JJ (r) AD (t) JJ (c)     Eating meals 3  -JJ (r) AD (t) JJ (c)     AM-PAC 6 Clicks Score (OT) 17  -JJ (r) AD (t)     Row Name 07/08/22 1000          How much help from another person do you currently need...    Turning from your back to your side while in flat bed without using bedrails? 3  -JUAN     Moving from lying on back to sitting on the side of a flat bed without bedrails? 3  -JUAN     Moving to and from a bed to a chair (including a wheelchair)? 3  -JUAN     Standing up from a chair using your arms (e.g., wheelchair, bedside chair)? 3  -JUAN     Climbing 3-5 steps with a railing? 2  -JUAN     To walk in hospital room? 3  -JUAN     AM-PAC 6 Clicks Score (PT) 17  -JUAN     Highest level of mobility 5 --> Static standing  -JUAN           User Key  (r) = Recorded By, (t) = Taken By, (c) = Cosigned By    Initials Name Provider Type    Carrie Clark, PTA Physical Therapist Assistant    Coleen Martinez, OTR/L, CSRS Occupational Therapist    Sara Davis, OT Student OT  Student                Occupational Therapy Education                 Title: PT OT SLP Therapies (Done)     Topic: Occupational Therapy (Done)     Point: ADL training (Done)     Description:   Instruct learner(s) on proper safety adaptation and remediation techniques during self care or transfers.   Instruct in proper use of assistive devices.              Learning Progress Summary           Patient Acceptance, E, VU by AD at 7/8/2022 1104    Acceptance, E, VU by AD at 7/7/2022 1002    Acceptance, E, VU by AD at 7/6/2022 1032    Acceptance, E,TB, VU,NR by  at 7/5/2022 2250    Comment: connie called on the phone and also received education.    Acceptance, E,D, NR by  at 7/5/2022 0942   Family Acceptance, E,TB, VU,NR by  at 7/5/2022 2250    Comment: connie called on the phone and also received education.                   Point: Home exercise program (Done)     Description:   Instruct learner(s) on appropriate technique for monitoring, assisting and/or progressing therapeutic exercises/activities.              Learning Progress Summary           Patient Acceptance, E, VU by AD at 7/8/2022 1104    Acceptance, E, VU by AD at 7/7/2022 1002    Acceptance, E, VU by AD at 7/6/2022 1032    Acceptance, E,TB, VU,NR by  at 7/5/2022 2250    Comment: connie called on the phone and also received education.    Acceptance, E,D, NR by  at 7/5/2022 0942   Family Acceptance, E,TB, VU,NR by  at 7/5/2022 2250    Comment: connie called on the phone and also received education.                               User Key     Initials Effective Dates Name Provider Type Discipline     06/16/21 -  Arlen Hansen, RN Registered Nurse Nurse     08/28/18 -  Maya Rodriguez, OTR/L Occupational Therapist OT    AD 05/04/22 -  Sara Florez OT Student OT Student OT              OT Recommendation and Plan     Plan of Care Review  Plan of Care Reviewed With: patient  Outcome Evaluation: OT tx complete. Pt with min A for bed mobility, and  sat EOB with no balance deficits. Pt doffed/donned socks sitting EOB with extended time with CGA -SBA for balance.  Pt completed sit<>stand with CGA with posterior lean in standing. Pt completed fxl mobility in room with CGA-min A for balance and use of straight cane to improve safety and I with ADLs and IADLs, becoming incontinent of urine with ambulation. Pt with vcs provided and used urinal standing supported. Pt with 2x LOB posteriorly with ambulation. Pt seated in chair and new gown donned d/t lines/IV following episode of incontinence. Continue OT POC.     Time Calculation:    Time Calculation- OT     Row Name 07/08/22 1023 07/08/22 0823          Time Calculation- OT    OT Start Time -- 0823  add 4 minutes for discussion of care with RN this am.  -JJ     OT Stop Time -- 0914  -JJ (r) AD (t) JJ (c)     OT Time Calculation (min) -- 51 min  -JJ (r) AD (t)     Total Timed Code Minutes- OT -- 55 minute(s)  -JJ     OT Received On -- 07/08/22  -JJ (r) AD (t) JJ (c)            Timed Charges    35860 - Gait Training Minutes  11  -JUAN --     53703 - OT Self Care/Mgmt Minutes -- 55  -JJ            Total Minutes    Timed Charges Total Minutes 11  -JUAN 55  -JJ      Total Minutes 11  -JUAN 55  -JJ           User Key  (r) = Recorded By, (t) = Taken By, (c) = Cosigned By    Initials Name Provider Type    Carrie Clark, PTA Physical Therapist Assistant    Coleen Martinez, OTR/L, CSRS Occupational Therapist    Sara Davis OT Student OT Student                       Sara Florez OT Student  7/8/2022

## 2022-07-09 LAB
BACTERIA UR QL AUTO: ABNORMAL /HPF
BILIRUB UR QL STRIP: NEGATIVE
CLARITY UR: ABNORMAL
COLOR UR: ABNORMAL
GLUCOSE BLDC GLUCOMTR-MCNC: 168 MG/DL (ref 70–130)
GLUCOSE BLDC GLUCOMTR-MCNC: 178 MG/DL (ref 70–130)
GLUCOSE BLDC GLUCOMTR-MCNC: 182 MG/DL (ref 70–130)
GLUCOSE BLDC GLUCOMTR-MCNC: 206 MG/DL (ref 70–130)
GLUCOSE UR STRIP-MCNC: ABNORMAL MG/DL
HGB UR QL STRIP.AUTO: ABNORMAL
KETONES UR QL STRIP: ABNORMAL
LEUKOCYTE ESTERASE UR QL STRIP.AUTO: ABNORMAL
NITRITE UR QL STRIP: NEGATIVE
PH UR STRIP.AUTO: 5.5 [PH] (ref 5–8)
PROT UR QL STRIP: ABNORMAL
QT INTERVAL: 350 MS
QT INTERVAL: 352 MS
QTC INTERVAL: 491 MS
QTC INTERVAL: 515 MS
RBC # UR STRIP: ABNORMAL /HPF
REF LAB TEST METHOD: ABNORMAL
SP GR UR STRIP: 1.02 (ref 1–1.03)
SQUAMOUS #/AREA URNS HPF: ABNORMAL /HPF
UROBILINOGEN UR QL STRIP: ABNORMAL
WBC # UR STRIP: ABNORMAL /HPF

## 2022-07-09 PROCEDURE — 63710000001 INSULIN DETEMIR PER 5 UNITS: Performed by: FAMILY MEDICINE

## 2022-07-09 PROCEDURE — 99024 POSTOP FOLLOW-UP VISIT: CPT | Performed by: NURSE PRACTITIONER

## 2022-07-09 PROCEDURE — 87086 URINE CULTURE/COLONY COUNT: CPT | Performed by: NURSE PRACTITIONER

## 2022-07-09 PROCEDURE — 82962 GLUCOSE BLOOD TEST: CPT

## 2022-07-09 PROCEDURE — 25010000002 CEFEPIME PER 500 MG: Performed by: NURSE PRACTITIONER

## 2022-07-09 PROCEDURE — 97116 GAIT TRAINING THERAPY: CPT

## 2022-07-09 PROCEDURE — 81001 URINALYSIS AUTO W/SCOPE: CPT | Performed by: NURSE PRACTITIONER

## 2022-07-09 PROCEDURE — 93005 ELECTROCARDIOGRAM TRACING: CPT | Performed by: NEUROLOGICAL SURGERY

## 2022-07-09 PROCEDURE — 93005 ELECTROCARDIOGRAM TRACING: CPT | Performed by: INTERNAL MEDICINE

## 2022-07-09 PROCEDURE — 63710000001 INSULIN LISPRO (HUMAN) PER 5 UNITS: Performed by: NURSE PRACTITIONER

## 2022-07-09 RX ORDER — SOTALOL HYDROCHLORIDE 80 MG/1
80 TABLET ORAL 2 TIMES DAILY
Status: DISCONTINUED | OUTPATIENT
Start: 2022-07-09 | End: 2022-07-10

## 2022-07-09 RX ORDER — ISOSORBIDE MONONITRATE 60 MG/1
60 TABLET, EXTENDED RELEASE ORAL
Status: DISCONTINUED | OUTPATIENT
Start: 2022-07-10 | End: 2022-07-11 | Stop reason: HOSPADM

## 2022-07-09 RX ADMIN — CEFEPIME HYDROCHLORIDE 2 G: 2 INJECTION, POWDER, FOR SOLUTION INTRAVENOUS at 05:06

## 2022-07-09 RX ADMIN — INSULIN DETEMIR 30 UNITS: 100 INJECTION, SOLUTION SUBCUTANEOUS at 21:02

## 2022-07-09 RX ADMIN — INSULIN LISPRO 3 UNITS: 100 INJECTION, SOLUTION INTRAVENOUS; SUBCUTANEOUS at 17:17

## 2022-07-09 RX ADMIN — LISINOPRIL 40 MG: 20 TABLET ORAL at 08:32

## 2022-07-09 RX ADMIN — Medication 10 ML: at 20:50

## 2022-07-09 RX ADMIN — GABAPENTIN 300 MG: 300 CAPSULE ORAL at 08:05

## 2022-07-09 RX ADMIN — DOCUSATE SODIUM 50 MG AND SENNOSIDES 8.6 MG 2 TABLET: 8.6; 5 TABLET, FILM COATED ORAL at 08:04

## 2022-07-09 RX ADMIN — LEVETIRACETAM 500 MG: 500 TABLET, FILM COATED ORAL at 08:04

## 2022-07-09 RX ADMIN — DOCUSATE SODIUM 50 MG AND SENNOSIDES 8.6 MG 2 TABLET: 8.6; 5 TABLET, FILM COATED ORAL at 20:48

## 2022-07-09 RX ADMIN — LEVETIRACETAM 500 MG: 500 TABLET, FILM COATED ORAL at 20:48

## 2022-07-09 RX ADMIN — INSULIN LISPRO 5 UNITS: 100 INJECTION, SOLUTION INTRAVENOUS; SUBCUTANEOUS at 08:04

## 2022-07-09 RX ADMIN — Medication 5 MG/HR: at 22:24

## 2022-07-09 RX ADMIN — SODIUM CHLORIDE 5 MG/HR: 9 INJECTION, SOLUTION INTRAVENOUS at 04:25

## 2022-07-09 RX ADMIN — OXYCODONE AND ACETAMINOPHEN 1 TABLET: 325; 10 TABLET ORAL at 07:54

## 2022-07-09 RX ADMIN — LABETALOL HYDROCHLORIDE 10 MG: 5 INJECTION INTRAVENOUS at 05:11

## 2022-07-09 RX ADMIN — AMLODIPINE BESYLATE 10 MG: 10 TABLET ORAL at 08:05

## 2022-07-09 RX ADMIN — INSULIN LISPRO 3 UNITS: 100 INJECTION, SOLUTION INTRAVENOUS; SUBCUTANEOUS at 21:02

## 2022-07-09 RX ADMIN — ATORVASTATIN CALCIUM 20 MG: 10 TABLET, FILM COATED ORAL at 20:48

## 2022-07-09 RX ADMIN — INSULIN LISPRO 3 UNITS: 100 INJECTION, SOLUTION INTRAVENOUS; SUBCUTANEOUS at 11:52

## 2022-07-09 RX ADMIN — CEFEPIME HYDROCHLORIDE 2 G: 2 INJECTION, POWDER, FOR SOLUTION INTRAVENOUS at 21:03

## 2022-07-09 RX ADMIN — CEFEPIME HYDROCHLORIDE 2 G: 2 INJECTION, POWDER, FOR SOLUTION INTRAVENOUS at 13:31

## 2022-07-09 RX ADMIN — ISOSORBIDE MONONITRATE 30 MG: 30 TABLET, EXTENDED RELEASE ORAL at 08:04

## 2022-07-09 RX ADMIN — SOTALOL HYDROCHLORIDE 80 MG: 80 TABLET ORAL at 20:49

## 2022-07-09 RX ADMIN — GABAPENTIN 300 MG: 300 CAPSULE ORAL at 16:05

## 2022-07-09 RX ADMIN — SOTALOL HYDROCHLORIDE 120 MG: 80 TABLET ORAL at 08:04

## 2022-07-09 RX ADMIN — OXYCODONE AND ACETAMINOPHEN 1 TABLET: 325; 10 TABLET ORAL at 12:31

## 2022-07-09 RX ADMIN — FAMOTIDINE 40 MG: 20 TABLET, FILM COATED ORAL at 08:04

## 2022-07-09 RX ADMIN — OXYCODONE AND ACETAMINOPHEN 1 TABLET: 325; 10 TABLET ORAL at 17:38

## 2022-07-09 RX ADMIN — GABAPENTIN 300 MG: 300 CAPSULE ORAL at 23:18

## 2022-07-09 RX ADMIN — Medication 10 ML: at 08:05

## 2022-07-09 NOTE — PLAN OF CARE
VSS. Patient A&Ox4 with intermittent confusion. Neurological exam remained stable this shift. UP to chair with PT. Pending LTACH consult.

## 2022-07-09 NOTE — THERAPY TREATMENT NOTE
Acute Care - Physical Therapy Treatment Note  [facility]     Patient Name: Elijah Escobar  : 1955  MRN: [id number]  Today's Date: 2022      Visit Dx:     ICD-10-CM ICD-9-CM   1. Postoperative infection, unspecified type, initial encounter  T81.40XA 998.59   2. S/P craniotomy  Z98.890 V45.89   3. Impaired mobility and ADLs  Z74.09 V49.89    Z78.9    4. Impaired mobility  Z74.09 799.89     Patient Active Problem List   Diagnosis   • Meningioma s/p craniotomy   • Hypertension   • GERD (gastroesophageal reflux disease)   • Coronary artery disease   • Morbid obesity (HCC)   • Type 2 diabetes mellitus with hyperglycemia and peripheral neuropathy, with long-term current use of insulin (HCC)   • Leukocytosis   • Other specified anemias   • Paroxysmal atrial fibrillation with rapid ventricular response (HCC)   • Atrial fibrillation with RVR (HCC)   • Post-operative infection   • S/P craniotomy     Past Medical History:   Diagnosis Date   • Brain tumor (HCC)    • Coronary artery disease    • COVID-19 vaccine series completed     MODERNA X 3; LAST DOSE 3/2022   • Diabetes (HCC)    • Erectile dysfunction    • GERD (gastroesophageal reflux disease)    • Hypercholesteremia    • Hypertension      Past Surgical History:   Procedure Laterality Date   • BALLOON ANGIOPLASTY, ARTERY Right    • COLONOSCOPY     • CRANIECTOMY Right 2022    Procedure: CRANIECTOMY WITH INCISIONAL WASHOUT;  Surgeon: Felipe Banerjee MD;  Location: [facility] OR;  Service: Neurosurgery;  Laterality: Right;   • CRANIOTOMY FOR TUMOR Right 2022    Procedure: CRANIOTOMY FOR TUMOR STERIOTACTIC WITH BRAIN LAB, right;  Surgeon: Flaco Portillo MD;  Location: [facility] OR;  Service: Neurosurgery;  Laterality: Right;     PT Assessment (last 12 hours)     PT Evaluation and Treatment     Row Name 22 1112          Physical Therapy Time and Intention    Subjective Information complains of;weakness;fatigue;pain  -CHRISTIANO     Document Type therapy  note (daily note)  -CHRISTIANO     Mode of Treatment physical therapy  -CHRISTIANO     Comment confusion,  -CHRISTIANO     Row Name 07/09/22 1112          General Information    Existing Precautions/Restrictions fall  helmet when OOB  -CHRISTIANO     Row Name 07/09/22 1112          Pain    Pretreatment Pain Rating 3/10  -CHRISTIANO     Posttreatment Pain Rating 6/10  -CHRISTIANO     Pain Location - Side/Orientation Right  -CHRISTIANO     Pain Location - hip;back  -CHRISTIANO     Pain Intervention(s) Repositioned;Ambulation/increased activity  -CHRISTIANO     Row Name 07/09/22 1112          Bed Mobility    Supine-Sit [place] (Bed Mobility) verbal cues;minimum assist (75% patient effort)  -CHRISTIANO     Sit-Supine [place] (Bed Mobility) --  chair  -CHRISTIANO     Assistive Device (Bed Mobility) bed rails;head of bed elevated  -CHRISTIANO     Row Name 07/09/22 1112          Transfers    Sit-Stand [place] (Transfers) verbal cues;contact guard  -CHRISTIANO     Stand-Sit [place] (Transfers) verbal cues;contact guard  -CHRISTIANO     Row Name 07/09/22 1112          Gait/Stairs (Locomotion)    [place] Level (Gait) verbal cues;minimum assist (75% patient effort)  -CHRISTIANO     Assistive Device (Gait) cane, straight  -CHRISTIANO     Distance in Feet (Gait) 120  -CHRISTIANO     Deviations/Abnormal Patterns (Gait) gait speed decreased  -CHRISTIANO     Comment, (Gait/Stairs) several standing rests  -CHRISTIANO     Row Name             Wound 06/16/22 0951 Right anterior scalp Incision    Wound - Properties Group Placement Date: 06/16/22  -JBA Placement Time: 0951 -JBA Side: Right  -JBA Orientation: anterior  -JBA Location: scalp  -JBA Primary Wound Type: Incision  -JBA     Retired Wound - Properties Group Placement Date: 06/16/22  -JBA Placement Time: 0951 -JBA Side: Right  -JBA Orientation: anterior  -JBA Location: scalp  -JBA Primary Wound Type: Incision  -JBA     Retired Wound - Properties Group Date first assessed: 06/16/22  -JBA Time first assessed: 0951  -JBA Side: Right  -JBA Location: scalp  -JBA Primary Wound Type: Incision  -JBA     Row  Name 07/09/22 1112          Positioning and Restraints    Pre-Treatment Position in bed  -CHRISTIANO     Post Treatment Position chair  -CHRISTIANO     In Chair sitting;call light within reach;encouraged to call for assist;exit alarm on;notified ns  -CHRISTIANO           User Key  (r) = Recorded By, (t) = Taken By, (c) = Cosigned By    Initials Name Provider Type    CHRISTIANO Александр Saravia, PTA Physical Therapist Assistant    Ricky Watts, RN Registered Nurse                Physical Therapy Education                 Title: PT OT SLP Therapies (Done)     Topic: Physical Therapy (Done)     Point: Mobility training (Done)     Learning Progress Summary           Patient Acceptance, E, VU,NR by CHRISTIANO at 7/9/2022 1112    Comment: safety, benefits of activity    Acceptance, E,D, DU by JUAN at 7/8/2022 1021    Comment: safe use of straight cane    Acceptance, E, NR by CHRISTIANO at 7/7/2022 1402    Comment: safety, amb, helmet on when up    Acceptance, E, NR by CHRISTIANO at 7/6/2022 1104    Comment: safety with transfers and amb, wearing helmet when up    Acceptance, E,TB, VU,NR by  at 7/5/2022 2250    Comment: connie called on the phone and also received education.    Acceptance, E, VU by  at 7/4/2022 1152    Comment: safety, POC   Family Acceptance, E,TB, VU,NR by  at 7/5/2022 2250    Comment: connie called on the phone and also received education.                   Point: Home exercise program (Done)     Learning Progress Summary           Patient Acceptance, E,TB, VU,NR by  at 7/5/2022 2250    Comment: connie called on the phone and also received education.   Family Acceptance, E,TB, VU,NR by  at 7/5/2022 2250    Comment: connie called on the phone and also received education.                   Point: Body mechanics (Done)     Learning Progress Summary           Patient Acceptance, E,TB, VU,NR by  at 7/5/2022 2250    Comment: neadrián called on the phone and also received education.   Family Acceptance, E,TB, VU,NR by  at 7/5/2022 2250    Comment:  connie called on the phone and also received education.                   Point: Precautions (Done)     Learning Progress Summary           Patient Acceptance, E,TB, VU,NR by SF at 7/5/2022 2250    Comment: connie called on the phone and also received education.   Family Acceptance, E,TB, VU,NR by SF at 7/5/2022 2250    Comment: connie called on the phone and also received education.                               User Key     Initials Effective Dates Name Provider Type Discipline    JUAN 08/02/16 -  Carrie Callaway, PTA Physical Therapist Assistant PT    KR 06/16/21 -  Prabha Singh, PT DPT Physical Therapist PT    CHRISTIANO 12/08/16 -  Александр Saravia, PTA Physical Therapist Assistant PT    SF 06/16/21 -  Arlen Hansen, RN Registered Nurse Nurse              PT Recommendation and Plan     Plan of Care Reviewed With: patient  Progress: improving  Outcome Evaluation: Pt continues to have some confusion, needing to be redirected throughtout the tx.  Able to transfer supine to sitting with min assist with HOB elevated.  Transfered sit to stand with CGA. Amb 120' with min assist with straight cane, several standing rests.  Pt c/o pain in right side/low back with amb.  Will continue to work with pt to increase strength, improve balance and safety withamb.   Outcome Measures     Row Name 07/09/22 1112 07/08/22 1000 07/07/22 1402       How much help from another person do you currently need...    Turning from your back to your side while in flat bed without using bedrails? 3  -CHRISTIANO 3  -JUAN 3  -CHRISTIANO    Moving from lying on back to sitting on the side of a flat bed without bedrails? 3  -CHRISTIANO 3  -JUAN 3  -CHRISTIANO    Moving to and from a bed to a chair (including a wheelchair)? 3  -CHRISTIANO 3  -JUAN 3  -CHRISTIANO    Standing up from a chair using your arms (e.g., wheelchair, bedside chair)? 3  -CHRISTIANO 3  -JUAN 3  -CHRISTIANO    Climbing 3-5 steps with a railing? 2  -CHRISTIANO 2  -JUAN 2  -CHRISTIANO    To walk in hospital room? 3  -CHRISTIANO 3  -JUAN 3  -CHRISTIANO    AM-PAC 6 Clicks Score (PT) 17   -CHRISTIANO 17  -JUAN 17  -CHRISTIANO       Functional Assessment    Outcome Measure Options AM-PAC 6 Clicks Basic Mobility (PT)  -CHRISTIANO -- AM-PAC 6 Clicks Basic Mobility (PT)  -CHRISTIANO          User Key  (r) = Recorded By, (t) = Taken By, (c) = Cosigned By    Initials Name Provider Type    Carrie Clark, KYLAH Physical Therapist Assistant    Александр Morley PTA Physical Therapist Assistant                 Time Calculation:    PT Charges     Row Name 07/09/22 1112             Time Calculation    Start Time 1112  -CHRISTIANO      Stop Time 1146  -CHRISTIANO      Time Calculation (min) 34 min  -CHRISTIANO      PT Received On 07/09/22  -CHRISTIANO              Time Calculation- PT    Total Timed Code Minutes- PT 34 minute(s)  -CHRISTIANO              Timed Charges    66351 - Gait Training Minutes  34  -CHRISTIANO              Total Minutes    Timed Charges Total Minutes 34  -CHRISTIANO       Total Minutes 34  -CHRISTIANO            User Key  (r) = Recorded By, (t) = Taken By, (c) = Cosigned By    Initials Name Provider Type    Александр Morley PTA Physical Therapist Assistant              Therapy Charges for Today     Code Description Service Date Service Provider Modifiers Qty    [id number] HC GAIT TRAINING EA 15 MIN 7/9/2022 Александр Saravia PTA GP 2          PT G-Codes  Outcome Measure Options: AM-PAC 6 Clicks Basic Mobility (PT)  AM-PAC 6 Clicks Score (PT): 17  AM-PAC 6 Clicks Score (OT): Charlee Saravia PTA  7/9/2022

## 2022-07-09 NOTE — PLAN OF CARE
Goal Outcome Evaluation:  Plan of Care Reviewed With: patient        Progress: improving  Outcome Evaluation: Pt continues to have some confusion, needing to be redirected throughtout the tx.  Able to transfer supine to sitting with min assist with HOB elevated.  Transfered sit to stand with CGA. Amb 120' with min assist with straight cane, several standing rests.  Pt c/o pain in right side/low back with amb.  Will continue to work with pt to increase strength, improve balance and safety withamb.

## 2022-07-09 NOTE — PROGRESS NOTES
[facility] Medicine Services  INPATIENT PROGRESS NOTE    Patient Name: Elijah Escobar  Date of Admission: 6/30/2022  Today's Date: 07/09/22  Length of Stay: 9  Primary Care Physician: Dylan Lama APRN    Subjective   Chief Complaint: elevated blood sugar  HPI   7/4 Resting in bed, appears tired and mildly ill. Does not offer any new complaints.  7/5 Patient transferred to critical care for closer neurological observation and blood sugar control.   7/6 No new complaints. Blood sugar 100-120 range on insulin drip and NPO. HR is 70 with some PAC and long QT ?AV block. Sotalol on hold since last night.   7/7 No new complaints. -160 sitting up Afib. Still NPO.  7/8 HR is controlled today. Cardizem was not required. Sotalol with good effect watching QT.  7/9 Off Cardene drip. Blood pressure 155/68. Added Norvasc and Imdur 7/8.      Review of Systems   All pertinent negatives and positives are as above. All other systems have been reviewed and are negative unless otherwise stated.     Objective    Temp:  [98.3 °F (36.8 °C)-98.5 °F (36.9 °C)] 98.5 °F (36.9 °C)  Heart Rate:  [] 124  Resp:  [14-24] 14  BP: (125-161)/(57-88) 155/68  Physical Exam  GEN: Awake, alert, interactive, in NAD  HEENT:   Head is bandaged.  EOMI, Anicteric, Trachea midline  Lungs:  no wheezing/rales/rhonchi  Heart: irreg/irreg tachycardic, +S1/s2, no rub  ABD: soft, nt/nd, +BS, no guarding/rebound  Extremities: atraumatic, no cyanosis  Skin: no rashes or lesions  Neuro: AAOx3, DENISE  Psych: normal mood & affect    Results Review:  I have reviewed the labs, radiology results, and diagnostic studies.    Laboratory Data:   Results from last 7 days   Lab Units 07/06/22  0740 07/05/22  0754 07/03/22  0324   WBC 10*3/mm3 8.48 11.26* 10.67   HEMOGLOBIN g/dL 10.3* 10.8* 10.5*   HEMATOCRIT % 32.5* 31.7* 32.5*   PLATELETS 10*3/mm3 261 278 944        Results from last 7 days   Lab Units 07/06/22  0740  07/05/22  0754 07/03/22  0324   SODIUM mmol/L 138 136 137   POTASSIUM mmol/L 3.8 4.8 4.3   CHLORIDE mmol/L 103 101 102   CO2 mmol/L 26.0 22.0 28.0   BUN mg/dL 9 11 12   CREATININE mg/dL 0.77 1.39* 0.86   CALCIUM mg/dL 8.7 8.6 8.1*   BILIRUBIN mg/dL 0.5 0.5  --    ALK PHOS U/L 100 103  --    ALT (SGPT) U/L 16 18  --    AST (SGOT) U/L 11 12  --    GLUCOSE mg/dL 102* 226* 182*       Culture Data:   Blood Culture   Date Value Ref Range Status   06/30/2022 No growth at 3 days  Preliminary   06/30/2022 No growth at 3 days  Preliminary     Urine Culture   Date Value Ref Range Status   06/30/2022 <10,000 CFU/mL Gram Positive Cocci (A)  Final     Comment:     Call if further workup needed.       Wound Culture   Date Value Ref Range Status   07/01/2022 Scant growth (1+) Serratia marcescens (A)  Final       Radiology Data:   Imaging Results (Last 24 Hours)     ** No results found for the last 24 hours. **          I have reviewed the patient's current medications.     Assessment/Plan     Active Hospital Problems    Diagnosis    • Post-operative infection    • S/P craniotomy      Added automatically from request for surgery [id number]     • Paroxysmal atrial fibrillation with rapid ventricular response (HCC)    • Coronary artery disease    • Type 2 diabetes mellitus with hyperglycemia and peripheral neuropathy, with long-term current use of insulin (HCC)    • Hypertension    • GERD (gastroesophageal reflux disease)        Plan:  Status post craniotomy > Per neurosurgery  Suspected epidural infection > ID input noted  DM uncontrolled > Levemir to 25 units BID d/c  Humalog sliding scale d/c  Humulin R drip started 7/5 d/c 7/7  Resumed Levemir increase dose to 30 Units BID  Humalog sliding scale  Home dose Levemir 65 units HS    PAF > Off Cardizem drip to Sotalol > held due to QT 7/6  Sotalol 120 at 830 AM 7/7  Sotalol to 80 mg PO BID  If no rate control in the next hour resume Cardizem drip and stop Cardene  EKG 7/6 noted  Check  Magnesium/Phosphorus prn    HTN > Sotalol 80 mg po BID  Imdur to 60 mg po daily  Norvasc 10 mg po daily  Lisinopril 40 mg po daily  Continue to monitor closely  Cardene drip discontinued    Nutrition> Carb consistent diet restarted    Will continue to follow    Discharge Planning: Per attending.    Electronically signed by Angel Sarabia MD, 07/09/22, 10:29 CDT.

## 2022-07-09 NOTE — PLAN OF CARE
Goal Outcome Evaluation:           Progress: improving  Outcome Evaluation: continue to wean iv cardene gtt as blood pressure allows after adjustments made to meds today. neuros unchanged. up with pt and ot today ambulated and sat in chair with helmet on. tolerates diet and meds. blood sugars in the 200's with ssi. son visited and updated at bedside this evening.

## 2022-07-09 NOTE — PROGRESS NOTES
Daily Progress Note    ASSESSMENT:   Elijah Escobar is a 66 y.o. male with a significant medical history of recent craniotomy for tumor, 6/16/2022, coronary artery disease, A. fib, diabetes, erectile dysfunction, GERD, hypertension, hyperlipidemia. He presents with a new problem of a persistent right sided headache, particularly over the right temporal region and dizziness with standing.  Physical exam findings of tense but fluctuant area of edema from the mid frontal to right parietal region of the scalp, the area is tender to palpation, periorbital lesions with purulent drainage from the left eye, scalp incision is clean, dry, and intact, bright and awake, oriented x3, speech is clear, follows commands without prompting showing thumbs up and 2 fingers bilaterally, no pronator drift, left dysmetria, gross hyporeflexia, and bilateral Babinski.  CT of the head from 6/23/2022, showing expected postsurgical changes to the right frontal lobe, small amount of fluid collection and pneumocephalus in the right subdural space, small amount of vasogenic edema with 4.3 mm left-to-right midline shift.  Repeat CT of the head obtained 6/30/2022 shows no acute intracranial blood products, unresolved vasogenic edema, midline shift resolved, increased left frontal and temporal soft tissue swelling.  MRI of the brain from 6/30/2022 shows a 7.9 x 6.5 x 0.9 cm rim-enhancing fluid collection overlying the bone flap concerning for pseudomeningocele versus infection.    Past Medical History:   Diagnosis Date   • Brain tumor (HCC)    • Coronary artery disease    • COVID-19 vaccine series completed     MODERNA X 3; LAST DOSE 3/2022   • Diabetes (HCC)    • Erectile dysfunction    • GERD (gastroesophageal reflux disease)    • Hypercholesteremia    • Hypertension      Active Hospital Problems    Diagnosis    • Post-operative infection    • S/P craniotomy      Added automatically from request for surgery [id number]     • Paroxysmal atrial  fibrillation with rapid ventricular response (HCC)    • Coronary artery disease    • Type 2 diabetes mellitus with hyperglycemia and peripheral neuropathy, with long-term current use of insulin (HCC)    • Hypertension    • GERD (gastroesophageal reflux disease)      CHIEF COMPLAINT:  Right sided headache and intermittent dizziness     PLAN:   Neuro: Neurologic decline.  Bright and awake.  Oriented x 1 to person only.  Follows commands without prompting showing thumbs up and 2 fingers bilaterally.   8 Days Post-Op (7/1/2022) I&D and washout and craniectomy   Wound culture positive for Serratia   Continue [facility]   Keep ICU for close neurologic monitoring       CV: Continuous cardiac monitoring.  PICC placed to left upper extremity.     Intermittent use of Cardene to keep SBP <140.  A. fib rate fairly well controlled.   Hospitalist to adjust blood pressure medications to discontinue Cardene.  Appreciate assist  Pulm: Continuous pulse oximetry.  O2.  Maintaining O2 sat.  : Voiding.  Bladder scans and I/O cath per policy.  Repeat UA today.  FEN: Tolerating carb consistent diet.    GI: No acute issues  ENDO: Insulin drip discontinued.  Accu-Cheks per policy.   Diabetic educator consulted, second request.   Insulin needs adjusted based on Glucomander averages.  Appreciate pharmacy   ID: BC [id number].  Wound culture positive for Serratia.  Continue cefepime per ID.  Weekly CRP and ESR.  CRP 5.93.  ESR 80.  Heme:  DVT prophylaxis, SCDs  Pain: Tolerable at present  Dispo: PT/OT.  Strict use of cranial helmet while out of bed    Pending LTAC placement.    Subjective  Symptom stable.  Doing well    Temp:  [98.3 °F (36.8 °C)-98.5 °F (36.9 °C)] 98.5 °F (36.9 °C)  Heart Rate:  [] 124  Resp:  [14-24] 14  BP: (125-161)/(57-88) 155/68    Objective:  General Appearance:  Well-appearing and in no acute distress.    Vital signs: (most recent): Blood pressure 155/68, pulse (!) 124, temperature 98.5 °F (36.9 °C), temperature source  "Oral, resp. rate 14, height 177.8 cm (70\"), weight 112 kg (246 lb 4.8 oz), SpO2 94 %.      Neurologic Exam     Mental Status   Oriented to person.   Disoriented to place.   Disoriented to year.   Attention: normal. Concentration: normal.   Speech: speech is normal   Level of consciousness: alert    Bright and awake.  Oriented x 1 to person only.  Follows commands without prompting showing thumbs up and 2 fingers bilaterally.     Cranial Nerves     CN II   Visual fields full to confrontation.     CN III, IV, VI   Pupils are equal, round, and reactive to light.  Extraocular motions are normal.     CN V   Facial sensation intact.     CN VII   Facial expression full, symmetric.     CN VIII   CN VIII normal.     CN IX, X   CN IX normal.     CN XI   CN XI normal.     Motor Exam   Right arm tone: normal  Left arm tone: normal  Right leg tone: normal  Left leg tone: normal    Strength   Right deltoid: 5/5  Left deltoid: 5/5  Right biceps: 5/5  Left biceps: 5/5  Right triceps: 5/5  Left triceps: 5/5  Right wrist extension: 5/5  Left wrist extension: 5/5  Right iliopsoas: 5/5  Left iliopsoas: 5/5  Right quadriceps: 5/5  Left quadriceps: 5/5  Right anterior tibial: 5/5  Left anterior tibial: 5/5  Right gastroc: 5/5  Left gastroc: 5/5  Right EHL 5/5  Left EHL 5/5       Sensory Exam   Right arm light touch: normal  Left arm light touch: normal  Right leg light touch: normal  Left leg light touch: normal    Gait, Coordination, and Reflexes     Tremor   Resting tremor: absent  Intention tremor: absent  Action tremor: absent    DRAINS:   [REMOVED] Closed/Suction Drain 1 Scalp Bulb (Removed)   Site Description Unable to view 06/19/22 0735   Dressing Status Clean;Dry;Intact 06/19/22 0735   Drainage Appearance Serosanguineous 06/19/22 0735   Status To bulb suction 06/19/22 0735   Output (mL) 5 mL 06/19/22 0723       [REMOVED] Urethral Catheter Non-latex;Silicone 16 Fr. (Removed)   Daily Indications Selected surgeries ( tract, " abdomen) 06/16/22 1339   Site Assessment Clean;Skin intact 06/16/22 1339   Collection Container Standard drainage bag 06/16/22 1339   Securement Method Securing device 06/16/22 1339   Output (mL) 1100 mL 06/16/22 1233     LAB RESULTS:  ABG:     CBC:   Results from last 7 days   Lab Units 07/06/22  0740 07/05/22  0754 07/03/22  0324   WBC 10*3/mm3 8.48 11.26* 10.67   HEMOGLOBIN g/dL 10.3* 10.8* 10.5*   HEMATOCRIT % 32.5* 31.7* 32.5*   PLATELETS 10*3/mm3 261 278 266     BMP:  Results from last 7 days   Lab Units 07/06/22  0740 07/05/22  0754 07/03/22  0324   SODIUM mmol/L 138 136 137   POTASSIUM mmol/L 3.8 4.8 4.3   CHLORIDE mmol/L 103 101 102   CO2 mmol/L 26.0 22.0 28.0   BUN mg/dL 9 11 12   CREATININE mg/dL 0.77 1.39* 0.86   GLUCOSE mg/dL 102* 226* 182*   CALCIUM mg/dL 8.7 8.6 8.1*   ALT (SGPT) U/L 16 18  --      Culture Results:   Blood Culture   Date Value Ref Range Status   06/30/2022 No growth at 4 days  Preliminary   06/30/2022 No growth at 4 days  Preliminary     Urine Culture   Date Value Ref Range Status   06/30/2022 <10,000 CFU/mL Gram Positive Cocci (A)  Final     Comment:     Call if further workup needed.       Wound Culture   Date Value Ref Range Status   07/01/2022 Scant growth (1+) Serratia marcescens (A)  Final     Imaging Results (Last 24 Hours)     ** No results found for the last 24 hours. **        Lab Results (last 24 hours)     Procedure Component Value Units Date/Time    Urinalysis, Microscopic Only - Urine, Random Void [[id number]] Collected: 07/09/22 1032    Specimen: Urine, Random Void Updated: 07/09/22 1042    Urinalysis With Culture If Indicated - Urine, Random Void [[id number]] Collected: 07/09/22 1032    Specimen: Urine, Random Void Updated: 07/09/22 1037    POC Glucose Once [[id number]]  (Abnormal) Collected: 07/09/22 0749    Specimen: Blood Updated: 07/09/22 0802     Glucose 206 mg/dL      Comment: : [id number] Reasor NathanMeter ID: [id number]       POC Glucose Once [[id number]]   (Abnormal) Collected: 07/08/22 2114    Specimen: Blood Updated: 07/08/22 2125     Glucose 190 mg/dL      Comment: : [id number] Estrellita ZiabarrettPriti ID: [id number]       POC Glucose Once [[id number]]  (Abnormal) Collected: 07/08/22 1826    Specimen: Blood Updated: 07/08/22 1837     Glucose 226 mg/dL      Comment: : [id number] Damon Rodrigues ID: [id number]       POC Glucose Once [[id number]]  (Abnormal) Collected: 07/08/22 1229    Specimen: Blood Updated: 07/08/22 1240     Glucose 254 mg/dL      Comment: : [id number] Damon Rodrigues ID: [id number]           Rahul Arnold, APRN

## 2022-07-10 LAB
ALBUMIN SERPL-MCNC: 2.9 G/DL (ref 3.5–5.2)
ALBUMIN/GLOB SERPL: 0.8 G/DL
ALP SERPL-CCNC: 100 U/L (ref 39–117)
ALT SERPL W P-5'-P-CCNC: 9 U/L (ref 1–41)
ANION GAP SERPL CALCULATED.3IONS-SCNC: 11 MMOL/L (ref 5–15)
AST SERPL-CCNC: 9 U/L (ref 1–40)
BACTERIA SPEC AEROBE CULT: NO GROWTH
BASOPHILS # BLD AUTO: 0.04 10*3/MM3 (ref 0–0.2)
BASOPHILS NFR BLD AUTO: 0.6 % (ref 0–1.5)
BILIRUB SERPL-MCNC: 0.3 MG/DL (ref 0–1.2)
BUN SERPL-MCNC: 6 MG/DL (ref 8–23)
BUN/CREAT SERPL: 9 (ref 7–25)
CALCIUM SPEC-SCNC: 8.2 MG/DL (ref 8.6–10.5)
CHLORIDE SERPL-SCNC: 105 MMOL/L (ref 98–107)
CO2 SERPL-SCNC: 22 MMOL/L (ref 22–29)
CREAT SERPL-MCNC: 0.67 MG/DL (ref 0.76–1.27)
DEPRECATED RDW RBC AUTO: 42.2 FL (ref 37–54)
EGFRCR SERPLBLD CKD-EPI 2021: 103 ML/MIN/1.73
EOSINOPHIL # BLD AUTO: 0.09 10*3/MM3 (ref 0–0.4)
EOSINOPHIL NFR BLD AUTO: 1.3 % (ref 0.3–6.2)
ERYTHROCYTE [DISTWIDTH] IN BLOOD BY AUTOMATED COUNT: 12.1 % (ref 12.3–15.4)
GLOBULIN UR ELPH-MCNC: 3.7 GM/DL
GLUCOSE BLDC GLUCOMTR-MCNC: 142 MG/DL (ref 70–130)
GLUCOSE BLDC GLUCOMTR-MCNC: 171 MG/DL (ref 70–130)
GLUCOSE BLDC GLUCOMTR-MCNC: 182 MG/DL (ref 70–130)
GLUCOSE BLDC GLUCOMTR-MCNC: 236 MG/DL (ref 70–130)
GLUCOSE SERPL-MCNC: 147 MG/DL (ref 65–99)
HCT VFR BLD AUTO: 30.3 % (ref 37.5–51)
HGB BLD-MCNC: 9.6 G/DL (ref 13–17.7)
IMM GRANULOCYTES # BLD AUTO: 0.05 10*3/MM3 (ref 0–0.05)
IMM GRANULOCYTES NFR BLD AUTO: 0.7 % (ref 0–0.5)
LYMPHOCYTES # BLD AUTO: 1.82 10*3/MM3 (ref 0.7–3.1)
LYMPHOCYTES NFR BLD AUTO: 26.1 % (ref 19.6–45.3)
MCH RBC QN AUTO: 30.2 PG (ref 26.6–33)
MCHC RBC AUTO-ENTMCNC: 31.7 G/DL (ref 31.5–35.7)
MCV RBC AUTO: 95.3 FL (ref 79–97)
MONOCYTES # BLD AUTO: 0.65 10*3/MM3 (ref 0.1–0.9)
MONOCYTES NFR BLD AUTO: 9.3 % (ref 5–12)
NEUTROPHILS NFR BLD AUTO: 4.31 10*3/MM3 (ref 1.7–7)
NEUTROPHILS NFR BLD AUTO: 62 % (ref 42.7–76)
NRBC BLD AUTO-RTO: 0 /100 WBC (ref 0–0.2)
PLATELET # BLD AUTO: 259 10*3/MM3 (ref 140–450)
PMV BLD AUTO: 9.4 FL (ref 6–12)
POTASSIUM SERPL-SCNC: 3.5 MMOL/L (ref 3.5–5.2)
PROT SERPL-MCNC: 6.6 G/DL (ref 6–8.5)
RBC # BLD AUTO: 3.18 10*6/MM3 (ref 4.14–5.8)
SODIUM SERPL-SCNC: 138 MMOL/L (ref 136–145)
WBC NRBC COR # BLD: 6.96 10*3/MM3 (ref 3.4–10.8)

## 2022-07-10 PROCEDURE — 25010000002 CEFEPIME PER 500 MG: Performed by: NURSE PRACTITIONER

## 2022-07-10 PROCEDURE — 25010000002 CEFEPIME PER 500 MG: Performed by: INTERNAL MEDICINE

## 2022-07-10 PROCEDURE — 80053 COMPREHEN METABOLIC PANEL: CPT | Performed by: FAMILY MEDICINE

## 2022-07-10 PROCEDURE — 82962 GLUCOSE BLOOD TEST: CPT

## 2022-07-10 PROCEDURE — 63710000001 INSULIN DETEMIR PER 5 UNITS: Performed by: FAMILY MEDICINE

## 2022-07-10 PROCEDURE — 99222 1ST HOSP IP/OBS MODERATE 55: CPT | Performed by: INTERNAL MEDICINE

## 2022-07-10 PROCEDURE — 99024 POSTOP FOLLOW-UP VISIT: CPT | Performed by: NURSE PRACTITIONER

## 2022-07-10 PROCEDURE — 85025 COMPLETE CBC W/AUTO DIFF WBC: CPT | Performed by: FAMILY MEDICINE

## 2022-07-10 PROCEDURE — 63710000001 INSULIN LISPRO (HUMAN) PER 5 UNITS: Performed by: NURSE PRACTITIONER

## 2022-07-10 RX ADMIN — METOPROLOL TARTRATE 25 MG: 25 TABLET, FILM COATED ORAL at 13:34

## 2022-07-10 RX ADMIN — LEVETIRACETAM 500 MG: 500 TABLET, FILM COATED ORAL at 20:49

## 2022-07-10 RX ADMIN — FAMOTIDINE 40 MG: 20 TABLET, FILM COATED ORAL at 08:53

## 2022-07-10 RX ADMIN — Medication 5 MG/HR: at 10:20

## 2022-07-10 RX ADMIN — AMLODIPINE BESYLATE 10 MG: 10 TABLET ORAL at 08:52

## 2022-07-10 RX ADMIN — LEVETIRACETAM 500 MG: 500 TABLET, FILM COATED ORAL at 08:52

## 2022-07-10 RX ADMIN — GABAPENTIN 300 MG: 300 CAPSULE ORAL at 17:01

## 2022-07-10 RX ADMIN — ATORVASTATIN CALCIUM 20 MG: 10 TABLET, FILM COATED ORAL at 20:49

## 2022-07-10 RX ADMIN — SOTALOL HYDROCHLORIDE 80 MG: 80 TABLET ORAL at 08:53

## 2022-07-10 RX ADMIN — OXYCODONE HYDROCHLORIDE AND ACETAMINOPHEN 1 TABLET: 7.5; 325 TABLET ORAL at 00:40

## 2022-07-10 RX ADMIN — OXYCODONE AND ACETAMINOPHEN 1 TABLET: 325; 10 TABLET ORAL at 13:41

## 2022-07-10 RX ADMIN — LABETALOL HYDROCHLORIDE 10 MG: 5 INJECTION INTRAVENOUS at 00:34

## 2022-07-10 RX ADMIN — ISOSORBIDE MONONITRATE 60 MG: 60 TABLET, EXTENDED RELEASE ORAL at 08:52

## 2022-07-10 RX ADMIN — DOCUSATE SODIUM 50 MG AND SENNOSIDES 8.6 MG 2 TABLET: 8.6; 5 TABLET, FILM COATED ORAL at 20:49

## 2022-07-10 RX ADMIN — GABAPENTIN 300 MG: 300 CAPSULE ORAL at 20:49

## 2022-07-10 RX ADMIN — CEFEPIME HYDROCHLORIDE 2 G: 2 INJECTION, POWDER, FOR SOLUTION INTRAVENOUS at 22:39

## 2022-07-10 RX ADMIN — INSULIN DETEMIR 30 UNITS: 100 INJECTION, SOLUTION SUBCUTANEOUS at 20:43

## 2022-07-10 RX ADMIN — INSULIN LISPRO 3 UNITS: 100 INJECTION, SOLUTION INTRAVENOUS; SUBCUTANEOUS at 09:01

## 2022-07-10 RX ADMIN — METOPROLOL TARTRATE 25 MG: 25 TABLET, FILM COATED ORAL at 20:49

## 2022-07-10 RX ADMIN — CEFEPIME HYDROCHLORIDE 2 G: 2 INJECTION, POWDER, FOR SOLUTION INTRAVENOUS at 13:35

## 2022-07-10 RX ADMIN — LABETALOL HYDROCHLORIDE 10 MG: 5 INJECTION INTRAVENOUS at 06:15

## 2022-07-10 RX ADMIN — OXYCODONE HYDROCHLORIDE AND ACETAMINOPHEN 1 TABLET: 7.5; 325 TABLET ORAL at 20:49

## 2022-07-10 RX ADMIN — Medication 10 ML: at 09:04

## 2022-07-10 RX ADMIN — CEFEPIME HYDROCHLORIDE 2 G: 2 INJECTION, POWDER, FOR SOLUTION INTRAVENOUS at 05:57

## 2022-07-10 RX ADMIN — INSULIN LISPRO 3 UNITS: 100 INJECTION, SOLUTION INTRAVENOUS; SUBCUTANEOUS at 20:43

## 2022-07-10 RX ADMIN — LISINOPRIL 40 MG: 20 TABLET ORAL at 08:53

## 2022-07-10 RX ADMIN — INSULIN LISPRO 5 UNITS: 100 INJECTION, SOLUTION INTRAVENOUS; SUBCUTANEOUS at 13:35

## 2022-07-10 RX ADMIN — GABAPENTIN 300 MG: 300 CAPSULE ORAL at 08:57

## 2022-07-10 NOTE — NURSING NOTE
Qtc has run high this shift 488-520s. ECG obtained at 2100 and Pt found to be in A-fib RVR with elevated Qtc. Provider was called and VS were discussed VS PO betapace order: betapace was directed to be administered as scheduled. Cardizem was started as has been off since 0000. Lopressor was given x2 for SBP >140. BMs x2. UOP good as patient utilizes both urinal and bedside commode with 1 assist. Pt has kept his helmet on whenever OOB.

## 2022-07-10 NOTE — PROGRESS NOTES
[facility] Medicine Services  INPATIENT PROGRESS NOTE    Patient Name: Elijah Escobar  Date of Admission: 6/30/2022  Today's Date: 07/10/22  Length of Stay: 10  Primary Care Physician: Dylan Lama APRN    Subjective   Chief Complaint: elevated blood sugar  HPI   7/4 Resting in bed, appears tired and mildly ill. Does not offer any new complaints.  7/5 Patient transferred to critical care for closer neurological observation and blood sugar control.   7/6 No new complaints. Blood sugar 100-120 range on insulin drip and NPO. HR is 70 with some PAC and long QT ?AV block. Sotalol on hold since last night.   7/7 No new complaints. -160 sitting up Afib. Still NPO.  7/8 HR is controlled today. Cardizem was not required. Sotalol with good effect watching QT.  7/9 Off Cardene drip. Blood pressure 155/68. Added Norvasc and Imdur 7/8.  7/10 HR still uncontrolled. Cardiology consult noted.       Review of Systems   All pertinent negatives and positives are as above. All other systems have been reviewed and are negative unless otherwise stated.     Objective    Temp:  [98.3 °F (36.8 °C)-99.5 °F (37.5 °C)] 98.3 °F (36.8 °C)  Heart Rate:  [] 123  Resp:  [13-23] 23  BP: ()/() 152/75  Physical Exam  GEN: Awake, alert, interactive, in NAD  HEENT:   Head is bandaged.  EOMI, Anicteric, Trachea midline  Lungs:  no wheezing/rales/rhonchi  Heart: irreg/irreg tachycardic, +S1/s2, no rub  ABD: soft, nt/nd, +BS, no guarding/rebound  Extremities: atraumatic, no cyanosis  Skin: no rashes or lesions  Neuro: AAOx3, DENISE  Psych: normal mood & affect    Results Review:  I have reviewed the labs, radiology results, and diagnostic studies.    Laboratory Data:   Results from last 7 days   Lab Units 07/10/22  0219 07/06/22  0740 07/05/22  0754   WBC 10*3/mm3 6.96 8.48 11.26*   HEMOGLOBIN g/dL 9.6* 10.3* 10.8*   HEMATOCRIT % 30.3* 32.5* 31.7*   PLATELETS 10*3/mm3 259 261 278         Results from last 7 days   Lab Units 07/10/22  0219 07/06/22  0740 07/05/22  0754   SODIUM mmol/L 138 138 136   POTASSIUM mmol/L 3.5 3.8 4.8   CHLORIDE mmol/L 105 103 101   CO2 mmol/L 22.0 26.0 22.0   BUN mg/dL 6* 9 11   CREATININE mg/dL 0.67* 0.77 1.39*   CALCIUM mg/dL 8.2* 8.7 8.6   BILIRUBIN mg/dL 0.3 0.5 0.5   ALK PHOS U/L 100 100 103   ALT (SGPT) U/L 9 16 18   AST (SGOT) U/L 9 11 12   GLUCOSE mg/dL 147* 102* 226*       Culture Data:   Blood Culture   Date Value Ref Range Status   06/30/2022 No growth at 3 days  Preliminary   06/30/2022 No growth at 3 days  Preliminary     Urine Culture   Date Value Ref Range Status   06/30/2022 <10,000 CFU/mL Gram Positive Cocci (A)  Final     Comment:     Call if further workup needed.       Wound Culture   Date Value Ref Range Status   07/01/2022 Scant growth (1+) Serratia marcescens (A)  Final       Radiology Data:   Imaging Results (Last 24 Hours)     ** No results found for the last 24 hours. **          I have reviewed the patient's current medications.     Assessment/Plan     Active Hospital Problems    Diagnosis    • Post-operative infection    • S/P craniotomy      Added automatically from request for surgery [id number]     • Paroxysmal atrial fibrillation with rapid ventricular response (HCC)    • Coronary artery disease    • Type 2 diabetes mellitus with hyperglycemia and peripheral neuropathy, with long-term current use of insulin ([facility])    • Hypertension    • GERD (gastroesophageal reflux disease)        Plan:  Status post craniotomy > Per neurosurgery  Suspected epidural infection > ID input noted  DM uncontrolled > Levemir to 25 units BID d/c  Humalog sliding scale d/c  Humulin R drip started 7/5 d/c 7/7  Resumed Levemir increase dose to 30 Units BID  Humalog sliding scale  Home dose Levemir 65 units HS    PAF > Off Cardizem drip to Sotalol > held due to QT 7/6  Sotalol 120 at 830 AM 7/7  Sotalol to 80 mg PO BID 7/9  If no rate control in the next hour resume  Cardizem drip and stop Cardene  EKG 7/6 noted  Check Magnesium/Phosphorus prn    HTN > Sotalol 80 mg po BID  Imdur to 60 mg po daily  Norvasc 10 mg po daily  Lisinopril 40 mg po daily  Continue to monitor closely  Cardene drip discontinued  ?resume Cardizem oral    Nutrition> Carb consistent diet restarted    Will continue to follow    Discharge Planning: Per attending.    Electronically signed by Angel Sarabia MD, 07/10/22, 11:09 CDT.

## 2022-07-10 NOTE — PROGRESS NOTES
Daily Progress Note    ASSESSMENT:   Elijah Escobar is a 66 y.o. male with a significant medical history of recent craniotomy for tumor, 6/16/2022, coronary artery disease, A. fib, diabetes, erectile dysfunction, GERD, hypertension, hyperlipidemia. He presents with a new problem of a persistent right sided headache, particularly over the right temporal region and dizziness with standing.  Physical exam findings of tense but fluctuant area of edema from the mid frontal to right parietal region of the scalp, the area is tender to palpation, periorbital lesions with purulent drainage from the left eye, scalp incision is clean, dry, and intact, bright and awake, oriented x3, speech is clear, follows commands without prompting showing thumbs up and 2 fingers bilaterally, no pronator drift, left dysmetria, gross hyporeflexia, and bilateral Babinski.  CT of the head from 6/23/2022, showing expected postsurgical changes to the right frontal lobe, small amount of fluid collection and pneumocephalus in the right subdural space, small amount of vasogenic edema with 4.3 mm left-to-right midline shift.  Repeat CT of the head obtained 6/30/2022 shows no acute intracranial blood products, unresolved vasogenic edema, midline shift resolved, increased left frontal and temporal soft tissue swelling.  MRI of the brain from 6/30/2022 shows a 7.9 x 6.5 x 0.9 cm rim-enhancing fluid collection overlying the bone flap concerning for pseudomeningocele versus infection.    Past Medical History:   Diagnosis Date   • Brain tumor (HCC)    • Coronary artery disease    • COVID-19 vaccine series completed     MODERNA X 3; LAST DOSE 3/2022   • Diabetes (HCC)    • Erectile dysfunction    • GERD (gastroesophageal reflux disease)    • Hypercholesteremia    • Hypertension      Active Hospital Problems    Diagnosis    • Post-operative infection    • S/P craniotomy      Added automatically from request for surgery [id number]     • Paroxysmal atrial  fibrillation with rapid ventricular response (HCC)    • Coronary artery disease    • Type 2 diabetes mellitus with hyperglycemia and peripheral neuropathy, with long-term current use of insulin ([facility])    • Hypertension    • GERD (gastroesophageal reflux disease)      CHIEF COMPLAINT:  Right sided headache and intermittent dizziness     PLAN:   Neuro: Neurologic at baseline.  Bright and awake.  Oriented x 3. Follows commands without prompting showing thumbs up and 2 fingers bilaterally.  No pronator drift.   9 Days Post-Op (7/1/2022) I&D and washout and craniectomy   Wound culture positive for Serratia   Continue [facility]   Keep ICU for close neurologic monitoring       CV: Continuous cardiac monitoring.     Not requiring Cardene to keep SBP <140.   A. fib with RVR.  On Cardizem.  Cardiology consulted.  Appreciate assist.   PICC placed to left upper extremity.    Pulm: Continuous pulse oximetry.  O2.  Maintaining O2 sat.  : Voiding.  Bladder scans and I/O cath per policy.  Repeat UA: 2+ leuks.  31-50 WBCs.  Negative for nitrites and bacteria.  Urine culture: No growth.  Continue to monitor.  FEN: Tolerating carb consistent diet.    GI: No acute issues  ENDO: Insulin drip discontinued.  Accu-Cheks per policy.   Diabetic educator consulted, second request.   Insulin needs adjusted based on Glucomander averages.  Appreciate pharmacy   ID: BC [id number].  Wound culture positive for Serratia.  Continue cefepime per ID.  Weekly CRP and ESR.  CRP 5.93.  ESR 80.  Heme:  DVT prophylaxis, SCDs  Pain: Tolerable at present  Dispo: PT/OT.  Strict use of cranial helmet while out of bed    Pending LTAC placement.    Subjective  Symptom stable.  Doing well    Temp:  [98.3 °F (36.8 °C)-99.5 °F (37.5 °C)] 98.3 °F (36.8 °C)  Heart Rate:  [] 123  Resp:  [13-23] 23  BP: ()/() 152/75    Objective:  General Appearance:  Well-appearing and in no acute distress.    Vital signs: (most recent): Blood pressure 152/75, pulse (!) 123,  "temperature 98.3 °F (36.8 °C), temperature source Oral, resp. rate 23, height 177.8 cm (70\"), weight 118 kg (260 lb 9.3 oz), SpO2 95 %.      Neurologic Exam     Mental Status   Oriented to person, place, and time.   Attention: normal. Concentration: normal.   Speech: speech is normal   Level of consciousness: alert    Bright and awake.  Oriented x 3.  Follows commands without prompting showing thumbs up and 2 fingers bilaterally.     Cranial Nerves     CN II   Visual fields full to confrontation.     CN III, IV, VI   Pupils are equal, round, and reactive to light.  Extraocular motions are normal.     CN V   Facial sensation intact.     CN VII   Facial expression full, symmetric.     CN VIII   CN VIII normal.     CN IX, X   CN IX normal.     CN XI   CN XI normal.     Motor Exam   Right arm tone: normal  Left arm tone: normal  Right leg tone: normal  Left leg tone: normal    Strength   Right deltoid: 5/5  Left deltoid: 5/5  Right biceps: 5/5  Left biceps: 5/5  Right triceps: 5/5  Left triceps: 5/5  Right wrist extension: 5/5  Left wrist extension: 5/5  Right iliopsoas: 5/5  Left iliopsoas: 5/5  Right quadriceps: 5/5  Left quadriceps: 5/5  Right anterior tibial: 5/5  Left anterior tibial: 5/5  Right gastroc: 5/5  Left gastroc: 5/5  Right EHL 5/5  Left EHL 5/5       Sensory Exam   Right arm light touch: normal  Left arm light touch: normal  Right leg light touch: normal  Left leg light touch: normal    Gait, Coordination, and Reflexes     Tremor   Resting tremor: absent  Intention tremor: absent  Action tremor: absent    DRAINS:   [REMOVED] Closed/Suction Drain 1 Scalp Bulb (Removed)   Site Description Unable to view 06/19/22 0735   Dressing Status Clean;Dry;Intact 06/19/22 0735   Drainage Appearance Serosanguineous 06/19/22 0735   Status To bulb suction 06/19/22 0735   Output (mL) 5 mL 06/19/22 0723       [REMOVED] Urethral Catheter Non-latex;Silicone 16 Fr. (Removed)   Daily Indications Selected surgeries ( tract, " abdomen) 06/16/22 1339   Site Assessment Clean;Skin intact 06/16/22 1339   Collection Container Standard drainage bag 06/16/22 1339   Securement Method Securing device 06/16/22 1339   Output (mL) 1100 mL 06/16/22 1233     LAB RESULTS:  ABG:     CBC:   Results from last 7 days   Lab Units 07/10/22  0219 07/06/22  0740 07/05/22  0754   WBC 10*3/mm3 6.96 8.48 11.26*   HEMOGLOBIN g/dL 9.6* 10.3* 10.8*   HEMATOCRIT % 30.3* 32.5* 31.7*   PLATELETS 10*3/mm3 259 261 278     BMP:  Results from last 7 days   Lab Units 07/10/22  0219 07/06/22  0740 07/05/22  0754   SODIUM mmol/L 138 138 136   POTASSIUM mmol/L 3.5 3.8 4.8   CHLORIDE mmol/L 105 103 101   CO2 mmol/L 22.0 26.0 22.0   BUN mg/dL 6* 9 11   CREATININE mg/dL 0.67* 0.77 1.39*   GLUCOSE mg/dL 147* 102* 226*   CALCIUM mg/dL 8.2* 8.7 8.6   ALT (SGPT) U/L 9 16 18     Culture Results:   Blood Culture   Date Value Ref Range Status   06/30/2022 No growth at 4 days  Preliminary   06/30/2022 No growth at 4 days  Preliminary     Urine Culture   Date Value Ref Range Status   06/30/2022 <10,000 CFU/mL Gram Positive Cocci (A)  Final     Comment:     Call if further workup needed.       Wound Culture   Date Value Ref Range Status   07/01/2022 Scant growth (1+) Serratia marcescens (A)  Final     Imaging Results (Last 24 Hours)     ** No results found for the last 24 hours. **        Lab Results (last 24 hours)     Procedure Component Value Units Date/Time    POC Glucose Once [[id number]]  (Abnormal) Collected: 07/10/22 0859    Specimen: Blood Updated: 07/10/22 0910     Glucose 171 mg/dL      Comment: : [id number] Maciej CamachoMeter ID: [id number]       Urine Culture - Urine, Urine, Random Void [[id number]]  (Normal) Collected: 07/09/22 1032    Specimen: Urine, Random Void Updated: 07/10/22 0654     Urine Culture No growth    Comprehensive Metabolic Panel [[id number]]  (Abnormal) Collected: 07/10/22 0219    Specimen: Blood Updated: 07/10/22 0259     Glucose 147 mg/dL      BUN 6  mg/dL      Creatinine 0.67 mg/dL      Sodium 138 mmol/L      Potassium 3.5 mmol/L      Chloride 105 mmol/L      CO2 22.0 mmol/L      Calcium 8.2 mg/dL      Total Protein 6.6 g/dL      Albumin 2.90 g/dL      ALT (SGPT) 9 U/L      AST (SGOT) 9 U/L      Alkaline Phosphatase 100 U/L      Total Bilirubin 0.3 mg/dL      Globulin 3.7 gm/dL      A/G Ratio 0.8 g/dL      BUN/Creatinine Ratio 9.0     Anion Gap 11.0 mmol/L      eGFR 103.0 mL/min/1.73      Comment: National Kidney Foundation and American Society of Nephrology (ASN) Task Force recommended calculation based on the Chronic Kidney Disease Epidemiology Collaboration (CKD-EPI) equation refit without adjustment for race.       Narrative:      GFR Normal >60  Chronic Kidney Disease <60  Kidney Failure <15      CBC & Differential [[id number]]  (Abnormal) Collected: 07/10/22 0219    Specimen: Blood Updated: 07/10/22 0237    Narrative:      The following orders were created for panel order CBC & Differential.  Procedure                               Abnormality         Status                     ---------                               -----------         ------                     CBC Auto Differential[[id number]]        Abnormal            Final result                 Please view results for these tests on the individual orders.    CBC Auto Differential [[id number]]  (Abnormal) Collected: 07/10/22 0219    Specimen: Blood Updated: 07/10/22 0237     WBC 6.96 10*3/mm3      RBC 3.18 10*6/mm3      Hemoglobin 9.6 g/dL      Hematocrit 30.3 %      MCV 95.3 fL      MCH 30.2 pg      MCHC 31.7 g/dL      RDW 12.1 %      RDW-SD 42.2 fl      MPV 9.4 fL      Platelets 259 10*3/mm3      Neutrophil % 62.0 %      Lymphocyte % 26.1 %      Monocyte % 9.3 %      Eosinophil % 1.3 %      Basophil % 0.6 %      Immature Grans % 0.7 %      Neutrophils, Absolute 4.31 10*3/mm3      Lymphocytes, Absolute 1.82 10*3/mm3      Monocytes, Absolute 0.65 10*3/mm3      Eosinophils, Absolute 0.09 10*3/mm3       Basophils, Absolute 0.04 10*3/mm3      Immature Grans, Absolute 0.05 10*3/mm3      nRBC 0.0 /100 WBC     POC Glucose Once [[id number]]  (Abnormal) Collected: 07/09/22 2056    Specimen: Blood Updated: 07/09/22 2108     Glucose 168 mg/dL      Comment: : [id number] Tyrone Mckinley TMeter ID: [id number]       POC Glucose Once [[id number]]  (Abnormal) Collected: 07/09/22 1656    Specimen: Blood Updated: 07/09/22 1708     Glucose 182 mg/dL      Comment: : [id number] Reasor NathanMeter ID: [id number]       POC Glucose Once [[id number]]  (Abnormal) Collected: 07/09/22 1116    Specimen: Blood Updated: 07/09/22 1127     Glucose 178 mg/dL      Comment: : [id number] Reasor NathanMeter ID: [id number]       Urinalysis, Microscopic Only - Urine, Random Void [[id number]]  (Abnormal) Collected: 07/09/22 1032    Specimen: Urine, Random Void Updated: 07/09/22 1115     RBC, UA None Seen /HPF      WBC, UA 31-50 /HPF      Bacteria, UA None Seen /HPF      Squamous Epithelial Cells, UA 0-2 /HPF      Methodology Manual Light Microscopy    Urinalysis With Culture If Indicated - Urine, Random Void [[id number]]  (Abnormal) Collected: 07/09/22 1032    Specimen: Urine, Random Void Updated: 07/09/22 1115     Color, UA Dark Yellow     Appearance, UA Cloudy     pH, UA 5.5     Specific Gravity, UA 1.024     Glucose,  mg/dL (1+)     Ketones, UA 80 mg/dL (3+)     Bilirubin, UA Negative     Blood, UA Trace     Protein, UA 30 mg/dL (1+)     Leuk Esterase, UA Moderate (2+)     Nitrite, UA Negative     Urobilinogen, UA 1.0 E.U./dL    Narrative:      In absence of clinical symptoms, the presence of pyuria, bacteria, and/or nitrites on the urinalysis result does not correlate with infection.        Rahul Arnold, APRN

## 2022-07-10 NOTE — PLAN OF CARE
"Goal Outcome Evaluation:  Plan of Care Reviewed With: patient            Converted to sinus rhythm from afib with RVR.  Metoprolol started and Betapace d/c'd.  Patient states, \"I am going to get the F@#$ out of here.\"  He had remained calm but has expressed many times wanting to go home. He did not seem to know about LTACH.  "

## 2022-07-10 NOTE — CONSULTS
LOS: 10 days   Patient Care Team:  Dylan Lama APRN as PCP - General (Nurse Practitioner)  Rahul Arnold APRN as Nurse Practitioner (Nurse Practitioner)  Flaco Portillo MD as Surgeon (Neurosurgery)    Chief Complaint: Paroxysmal atrial fibrillation     Subjective    Elijah Escobar is a 66 y.o. male who is being seen in consultation.  Patient has history of coronary artery disease  Prior percutaneous coronary intervention  He has had right-sided craniotomy for brain tumor approximately 4 weeks ago  Noted to have atrial fibrillation with rapid ventricular response  Started on IV Cardizem  Currently ventricular rates are under excellent control  Denies any chest pain  No palpitation  No presyncope  No syncope  No orthopnea  No paroxysmal nocturnal dyspnea  Resting comfortably  No other new issues or events  EKG as below  QTC prolongation noted            Telemetry: no malignant arrhythmia. No significant pauses.    Review of Systems   Constitutional: No chills   Has fatigue   No fever.   HENT: Negative.    Eyes: Negative.    Respiratory: Negative for cough,   No chest wall soreness,   Shortness of breath,   no wheezing, no stridor.    Cardiovascular: As above  Gastrointestinal: Negative for abdominal distention,  No abdominal pain,   No blood in stool,   No constipation,   No diarrhea,   No nausea   No vomiting.   Endocrine: Negative.    Genitourinary: Negative for difficulty urinating, dysuria, flank pain and hematuria.   Musculoskeletal: Negative.    Skin: Negative for rash and wound.   Allergic/Immunologic: Negative.    Neurological: Negative for dizziness, syncope, weakness,   No light-headedness  No  headaches.   Hematological: Does not bruise/bleed easily.   Psychiatric/Behavioral: Negative for agitation or behavioral problems,   No confusion,   the patient is  nervous/anxious.       History:   Past Medical History:   Diagnosis Date   • Brain tumor (HCC)    • Coronary artery disease    •  COVID-19 vaccine series completed     MODERNA X 3; LAST DOSE 3/2022   • Diabetes (HCC)    • Erectile dysfunction    • GERD (gastroesophageal reflux disease)    • Hypercholesteremia    • Hypertension      Past Surgical History:   Procedure Laterality Date   • BALLOON ANGIOPLASTY, ARTERY Right    • COLONOSCOPY     • CRANIECTOMY Right 7/1/2022    Procedure: CRANIECTOMY WITH INCISIONAL WASHOUT;  Surgeon: Felipe Banerjee MD;  Location:  [facility];  Service: Neurosurgery;  Laterality: Right;   • CRANIOTOMY FOR TUMOR Right 6/16/2022    Procedure: CRANIOTOMY FOR TUMOR STERIOTACTIC WITH BRAIN LAB, right;  Surgeon: Flaco Portillo MD;  Location:  [facility];  Service: Neurosurgery;  Laterality: Right;     Social History     Socioeconomic History   • Marital status:    Tobacco Use   • Smoking status: Current Every Day Smoker     Packs/day: 0.50   • Smokeless tobacco: Never Used   Vaping Use   • Vaping Use: Never used   Substance and Sexual Activity   • Alcohol use: Never   • Drug use: Never   • Sexual activity: Defer     Family History   Problem Relation Age of Onset   • Cancer Mother         liver   • Heart disease Father        Labs:  WBC WBC   Date Value Ref Range Status   07/10/2022 6.96 3.40 - 10.80 10*3/mm3 Final      HGB Hemoglobin   Date Value Ref Range Status   07/10/2022 9.6 (L) 13.0 - 17.7 g/dL Final      HCT Hematocrit   Date Value Ref Range Status   07/10/2022 30.3 (L) 37.5 - 51.0 % Final      Platelets Platelets   Date Value Ref Range Status   07/10/2022 259 140 - 450 10*3/mm3 Final      MCV MCV   Date Value Ref Range Status   07/10/2022 95.3 79.0 - 97.0 fL Final        Results from last 7 days   Lab Units 07/10/22  0219 07/06/22  0740 07/05/22  0754   SODIUM mmol/L 138 138 136   POTASSIUM mmol/L 3.5 3.8 4.8   CHLORIDE mmol/L 105 103 101   CO2 mmol/L 22.0 26.0 22.0   BUN mg/dL 6* 9 11   CREATININE mg/dL 0.67* 0.77 1.39*   CALCIUM mg/dL 8.2* 8.7 8.6   BILIRUBIN mg/dL 0.3 0.5 0.5   ALK PHOS U/L 100  100 103   ALT (SGPT) U/L 9 16 18   AST (SGOT) U/L 9 11 12   GLUCOSE mg/dL 147* 102* 226*     Lab Results   Component Value Date    TROPONINT <0.010 06/24/2022     PT/INR:  No results found for: PROTIME/No results found for: INR    Imaging Results (Last 72 Hours)     ** No results found for the last 72 hours. **          Objective     No Known Allergies    Medication Review: Performed  Current Facility-Administered Medications   Medication Dose Route Frequency Provider Last Rate Last Admin   • acetaminophen (TYLENOL) tablet 650 mg  650 mg Oral Q4H PRN Rahul Arnold APRN   650 mg at 07/01/22 1555    Or   • acetaminophen (TYLENOL) 160 MG/5ML solution 650 mg  650 mg Oral Q4H PRN Rahul Arnold APRN        Or   • acetaminophen (TYLENOL) suppository 650 mg  650 mg Rectal Q4H PRN Rahul Arnold APRN       • amLODIPine (NORVASC) tablet 10 mg  10 mg Oral Q24H Angel Sarabia MD   10 mg at 07/10/22 0852   • atorvastatin (LIPITOR) tablet 20 mg  20 mg Oral Nightly Rahul Arnold APRN   20 mg at 07/09/22 2048   • sennosides-docusate (PERICOLACE) 8.6-50 MG per tablet 2 tablet  2 tablet Oral BID Rahul Arnold APRN   2 tablet at 07/09/22 2048    And   • polyethylene glycol (MIRALAX) packet 17 g  17 g Oral Daily PRN Rahul Arnold APRN        And   • bisacodyl (DULCOLAX) EC tablet 5 mg  5 mg Oral Daily PRN Rahul Arnold APRN        And   • bisacodyl (DULCOLAX) suppository 10 mg  10 mg Rectal Daily PRN Rahul Arnold APRN       • cefepime (MAXIPIME) 2 g/100 mL 0.9% NS (mbp)  2 g Intravenous Q8H Edwin Santos MD   2 g at 07/10/22 0557   • dextrose (D50W) (25 g/50 mL) IV injection 25 g  25 g Intravenous Q15 Min PRN Rahul Arnold APRN       • dextrose (GLUTOSE) oral gel 15 g  15 g Oral Q15 Min PRN Rahul Arnold, APRN       • dilTIAZem (CARDIZEM) 125 mg in 125 mL 0.7% sodium chloride  infusion  5-15 mg/hr Intravenous Titrated Angel Sarabia MD 10 mL/hr at 07/10/22 1034 10 mg/hr at 07/10/22 1034   • famotidine  (PEPCID) tablet 40 mg  40 mg Oral Daily Rahul Arnold APRN   40 mg at 07/10/22 0853   • gabapentin (NEURONTIN) capsule 300 mg  300 mg Oral TID Rahul Arnold APRN   300 mg at 07/10/22 0857   • glucagon (human recombinant) (GLUCAGEN DIAGNOSTIC) injection 1 mg  1 mg Intramuscular Q15 Min PRN Rahul Arnold APRN       • insulin detemir (LEVEMIR) injection 30 Units  30 Units Subcutaneous Nightly Angel Sarabia MD   30 Units at 07/09/22 2102   • Insulin Lispro (humaLOG) injection 0-14 Units  0-14 Units Subcutaneous 4x Daily AC & at Bedtime Rahul Arnold APRN   3 Units at 07/10/22 0901   • isosorbide mononitrate (IMDUR) 24 hr tablet 60 mg  60 mg Oral Q24H Angel Sarabia MD   60 mg at 07/10/22 0852   • labetalol (NORMODYNE,TRANDATE) injection 10 mg  10 mg Intravenous Q6H PRN Rahul Arnold APRN   10 mg at 07/10/22 0615   • levETIRAcetam (KEPPRA) tablet 500 mg  500 mg Oral BID Rahul Arnold APRN   500 mg at 07/10/22 0852   • lisinopril (PRINIVIL,ZESTRIL) tablet 40 mg  40 mg Oral Daily Rahul Arnold APRN   40 mg at 07/10/22 0853   • niCARdipine (CARDENE) 25 mg in 250 mL NS infusion  5-15 mg/hr Intravenous Titrated Angel Sarabia MD   Stopped at 07/09/22 0805   • ondansetron (ZOFRAN) tablet 4 mg  4 mg Oral Q6H PRN Rahul Arnold APRN        Or   • ondansetron (ZOFRAN) injection 4 mg  4 mg Intravenous Q6H PRN Rahul Arnold APRN       • oxyCODONE-acetaminophen (PERCOCET)  MG per tablet 1 tablet  1 tablet Oral Q4H PRN Rahul Arnold APRN   1 tablet at 07/09/22 1738   • oxyCODONE-acetaminophen (PERCOCET) 7.5-325 MG per tablet 1 tablet  1 tablet Oral Q4H PRN Rahul Arnold APRN   1 tablet at 07/10/22 0040   • sodium chloride 0.9 % flush 10 mL  10 mL Intravenous Q12H Rahul Arnold APRN   10 mL at 07/10/22 0904   • sodium chloride 0.9 % flush 10 mL  10 mL Intravenous PRN Rahul Arnold APRN       • sotalol (BETAPACE) tablet 80 mg  80 mg Oral BID Angel Sarabia MD   80 mg at  "07/10/22 0853       Vital Sign Min/Max for last 24 hours  Temp  Min: 98.3 °F (36.8 °C)  Max: 99.5 °F (37.5 °C)   BP  Min: 96/67  Max: 173/128   Pulse  Min: 69  Max: 143   Resp  Min: 13  Max: 23   SpO2  Min: 92 %  Max: 100 %   No data recorded   Weight  Min: 118 kg (260 lb 9.3 oz)  Max: 118 kg (260 lb 9.3 oz)     Flowsheet Rows    Flowsheet Row First Filed Value   Admission Height 177.8 cm (70\") Documented at 06/30/2022 1904   Admission Weight 125 kg (276 lb) Documented at 06/30/2022 1904          Results for orders placed during the hospital encounter of 06/16/22    Adult Transthoracic Echo Complete W/ Cont if Necessary Per Protocol    Interpretation Summary  · Left ventricular ejection fraction appears to be 56 - 60%. Left ventricular systolic function is normal.  · Left ventricular wall thickness is consistent with mild concentric hypertrophy.  · Normal right ventricular cavity size and systolic function noted.  · There is no significant (greater than mild) valvular dysfunction.      Physical Exam:    General Appearance: Awake, alert, in no acute distress  Eyes: Pupils equal and reactive    Ears: Appear intact with no abnormalities noted  Nose: Nares normal, no drainage  Neck: supple, trachea midline, no carotid bruit and no JVD  Back: no kyphosis present,    Lungs: respirations regular, respirations even and respirations unlabored  Heart: normal S1, S2, irregular, 2/6 systolic murmur left sternal border  No rubs  Abdomen: normal bowel sounds, no tenderness   Skin: no bleeding, bruising or rash  Extremities: no cyanosis  Psychiatric/Behavioral: Negative for agitation, behavioral problems, confusion, the patient does  appear to be nervous/anxious.       Results Review:   I reviewed the patient's new clinical results.  I reviewed the patient's new imaging results and agree with the interpretation.  I reviewed the patient's other test results and agree with the interpretation  I personally viewed and interpreted the " patient's EKG/Telemetry data    Discussed with patient  Updated patient regarding any new or relevant abnormalities on review of records or any new findings on physical exam.   Mentioned to patient about purpose of visit and desirable health short and long term goals and objectives.     Reviewed available prior notes, consults, prior visits, laboratory findings, radiology and cardiology relevant reports.   Updated chart as applicable.   I have reviewed the patient's medical history in detail and updated the computerized patient record as relevant.          Assessment & Plan       Hypertension    GERD (gastroesophageal reflux disease)    Coronary artery disease    Type 2 diabetes mellitus with hyperglycemia and peripheral neuropathy, with long-term current use of insulin (HCC)    Paroxysmal atrial fibrillation with rapid ventricular response (HCC)    Post-operative infection    S/P craniotomy  Prolonged QTC  Mild left atrial enlargement    Plan    Discontinue sotalol  Start beta-blocker therapy  Keep on telemetry  Repeat EKG tomorrow  Recent echocardiogram reviewed  Shows preserved biventricular function  Not a candidate for anticoagulation given intracranial tumor  Aspirin if able to 81 mg p.o. daily  Discussed with patient  Discussed with ADITYA Breaux  DVT prophylaxis using SCDs         Shubham Kc MD  07/10/22  11:54 CDT    EMR Dragon/Transcription was used to dictate part of this note

## 2022-07-10 NOTE — NURSING NOTE
"Pt expressing his desire to leave.  He states \" I need to find my truck.\"  His confusion has increased slightly from this morning.  He had redirected well.  "

## 2022-07-10 NOTE — PROGRESS NOTES
"INFECTIOUS DISEASES PROGRESS NOTE    Patient:  Elijah Escobar  YOB: 1955  MRN: [id number]   Admit date: 2022   Admitting Physician: Flaco Portillo, *  Primary Care Physician: Dylan Lama APRN    Chief Complaint: \"Just wanting to be home\"        Interval History: Patient reports he thinks he is doing well.  He asked when he might be able to go home.    He did complain of some tenderness of his left IV site.  I spoke with SELENA Gonzalez and she is aware and was going to remove that IV.    No complaints of diarrhea or rash.        Allergies: No Known Allergies    Current Scheduled Medications:   amLODIPine, 10 mg, Oral, Q24H  atorvastatin, 20 mg, Oral, Nightly  cefepime, 2 g, Intravenous, Q8H  famotidine, 40 mg, Oral, Daily  gabapentin, 300 mg, Oral, TID  insulin detemir, 30 Units, Subcutaneous, Nightly  insulin lispro, 0-14 Units, Subcutaneous, 4x Daily AC & at Bedtime  isosorbide mononitrate, 60 mg, Oral, Q24H  levETIRAcetam, 500 mg, Oral, BID  lisinopril, 40 mg, Oral, Daily  senna-docusate sodium, 2 tablet, Oral, BID  sodium chloride, 10 mL, Intravenous, Q12H  sotalol, 80 mg, Oral, BID      Current PRN Medications:  •  acetaminophen **OR** acetaminophen **OR** acetaminophen  •  senna-docusate sodium **AND** polyethylene glycol **AND** bisacodyl **AND** bisacodyl  •  dextrose  •  dextrose  •  glucagon (human recombinant)  •  labetalol  •  ondansetron **OR** ondansetron  •  oxyCODONE-acetaminophen  •  oxyCODONE-acetaminophen  •  sodium chloride    Review of Systems   Gastrointestinal: Negative for diarrhea.   Skin: Negative for rash.       Objective     Vital Signs:  Temp (24hrs), Av.8 °F (37.1 °C), Min:98.3 °F (36.8 °C), Max:99.5 °F (37.5 °C)      /80   Pulse 113   Temp 98.3 °F (36.8 °C) (Oral)   Resp 14   Ht 177.8 cm (70\")   Wt 118 kg (260 lb 9.3 oz)   SpO2 98%   BMI 37.39 kg/m²         Physical Exam     General: The patient is nontoxic-appearing lying in bed no " acute distress  HEENT: Sclera anicteric.  Scalp incision appears to be healing well.  There is no surrounding erythema or drainage.  Lungs: Occasional scattered rhonchi anteriorly  Neuro: Alert, oriented, speech clear  Psych: Pleasant cooperative      Line/IV site: PICCupper arm right, condition patent and no redness left upper extremity peripheral IV tender.  No palpable cord     Results Review:    I reviewed the patient's new clinical results.    Lab Results:  CBC:   Lab Results   Lab 07/05/22  0754 07/06/22  0740 07/10/22  0219   WBC 11.26* 8.48 6.96   HEMOGLOBIN 10.8* 10.3* 9.6*   HEMATOCRIT 31.7* 32.5* 30.3*   PLATELETS 278 261 259         CMP:   Lab Results   Lab 07/05/22  0754 07/06/22  0740 07/10/22  0219   SODIUM 136 138 138   POTASSIUM 4.8 3.8 3.5   CHLORIDE 101 103 105   CO2 22.0 26.0 22.0   BUN 11 9 6*   CREATININE 1.39* 0.77 0.67*   CALCIUM 8.6 8.7 8.2*   BILIRUBIN 0.5 0.5 0.3   ALK PHOS 103 100 100   ALT (SGPT) 18 16 9   AST (SGOT) 12 11 9   GLUCOSE 226* 102* 147*       Estimated Creatinine Clearance: 139.6 mL/min (A) (by C-G formula based on SCr of 0.67 mg/dL (L)).    Culture Results:    Microbiology Results (last 10 days)     Procedure Component Value - Date/Time    Urine Culture - Urine, Urine, Random Void [[id number]]  (Normal) Collected: 07/09/22 1032    Lab Status: Final result Specimen: Urine, Random Void Updated: 07/10/22 0654     Urine Culture No growth    Urine Culture - Urine, Urine, Catheter In/Out [[id number]]  (Normal) Collected: 07/05/22 1846    Lab Status: Final result Specimen: Urine, Catheter In/Out Updated: 07/06/22 2017     Urine Culture No growth    Anaerobic Culture - Tissue, Cranium [[id number]] Collected: 07/01/22 1759    Lab Status: Final result Specimen: Tissue from Cranium Updated: 07/06/22 0544     Anaerobic Culture No anaerobes isolated at 5 days    Tissue / Bone Culture - Tissue, Cranium [[id number]]  (Abnormal)  (Susceptibility) Collected: 07/01/22 2149    Lab Status: Final  result Specimen: Tissue from Cranium Updated: 07/03/22 0947     Tissue Culture Scant growth (1+) Serratia marcescens     Gram Stain Moderate (3+) WBCs seen      Moderate (3+) Red blood cells      No organisms seen    Susceptibility      Serratia marcescens      STALIN      Cefepime Susceptible      Ceftazidime Susceptible      Ceftriaxone Susceptible      Gentamicin Susceptible      Levofloxacin Susceptible      Piperacillin + Tazobactam Susceptible      Tetracycline Resistant     Trimethoprim + Sulfamethoxazole Susceptible                       Susceptibility Comments     Serratia marcescens    Cefazolin sensitivity will not be reported for Enterobacteriaceae in non-urine isolates. If cefazolin is preferred, please call the microbiology lab to request an E-test.  With the exception of urinary-sourced infections, aminoglycosides should not be used as monotherapy.             Wound Culture - Wound, Cranium [[id number]]  (Abnormal)  (Susceptibility) Collected: 07/01/22 1747    Lab Status: Final result Specimen: Wound from Cranium Updated: 07/03/22 0953     Wound Culture Scant growth (1+) Serratia marcescens     Gram Stain Many (4+) WBCs seen      No organisms seen    Susceptibility      Serratia marcescens      STALIN      Cefepime Susceptible      Ceftazidime Susceptible      Ceftriaxone Susceptible      Gentamicin Susceptible      Levofloxacin Susceptible      Piperacillin + Tazobactam Susceptible      Tetracycline Resistant     Trimethoprim + Sulfamethoxazole Susceptible                       Susceptibility Comments     Serratia marcescens    Cefazolin sensitivity will not be reported for Enterobacteriaceae in non-urine isolates. If cefazolin is preferred, please call the microbiology lab to request an E-test.  With the exception of urinary-sourced infections, aminoglycosides should not be used as monotherapy.             Blood Culture - Blood, Arm, Left [[id number]]  (Normal) Collected: 06/30/22 2030    Lab Status: Final  result Specimen: Blood from Arm, Left Updated: 07/05/22 2232     Blood Culture No growth at 5 days    Blood Culture - Blood, Arm, Left [[id number]]  (Normal) Collected: 06/30/22 2026    Lab Status: Final result Specimen: Blood from Arm, Left Updated: 07/05/22 2232     Blood Culture No growth at 5 days    Urine Culture - Urine, Urine, Clean Catch [[id number]]  (Abnormal) Collected: 06/30/22 1947    Lab Status: Final result Specimen: Urine, Clean Catch Updated: 07/02/22 0933     Urine Culture <10,000 CFU/mL Gram Positive Cocci     Comment: Call if further workup needed.         Narrative:      Colonization of the urinary tract without infection is common. Treatment is discouraged unless the patient is symptomatic, pregnant, or undergoing an invasive urologic procedure.    Body Fluid Culture - Body Fluid, Scalp [[id number]]  (Abnormal)  (Susceptibility) Collected: 06/30/22 1618    Lab Status: Final result Specimen: Body Fluid from Scalp Updated: 07/03/22 0954     Body Fluid Culture Heavy growth (4+) Serratia marcescens      Scant growth (1+) Normal Skin Asmita     Gram Stain Many (4+) WBCs per low power field      Few (2+) Gram negative bacilli    Susceptibility      Serratia marcescens      STALIN      Cefepime Susceptible      Ceftazidime Susceptible      Ceftriaxone Susceptible      Gentamicin Susceptible      Levofloxacin Susceptible      Piperacillin + Tazobactam Susceptible      Trimethoprim + Sulfamethoxazole Susceptible                       Susceptibility Comments     Serratia marcescens    Cefazolin sensitivity will not be reported for Enterobacteriaceae in non-urine isolates. If cefazolin is preferred, please call the microbiology lab to request an E-test.  With the exception of urinary-sourced infections, aminoglycosides should not be used as monotherapy.             COVID-19, [facility]BBOTT IN-HOUSE,NASAL Swab (NO TRANSPORT MEDIA) 2 HR TAT - Swab, Nasopharynx [[id number]]  (Normal) Collected: 06/30/22 1230    Lab Status:  Final result Specimen: Swab from Nasopharynx Updated: 06/30/22 1316     COVID19 Presumptive Negative    Narrative:      Fact sheet for providers: https://www.fda.gov/media/914241/download     Fact sheet for patients: https://www.fda.gov/media/132625/download    Test performed by PCR.  If inconsistent with clinical signs and symptoms patient should be tested with different authorized molecular test.               Radiology:   Imaging Results (Last 72 Hours)     ** No results found for the last 72 hours. **          Assessment & Plan     Active Hospital Problems    Diagnosis    • Post-operative infection    • S/P craniotomy      Added automatically from request for surgery [id number]     • Paroxysmal atrial fibrillation with rapid ventricular response (HCC)    • Coronary artery disease    • Type 2 diabetes mellitus with hyperglycemia and peripheral neuropathy, with long-term current use of insulin ([facility])    • Hypertension    • GERD (gastroesophageal reflux disease)        IMPRESSION:  1. Subgaleal/epidural infection with Serratia marcescens and patient is status post recent craniotomy for meningioma  2. Tender IV site left upper extremity  3. Acute kidney injury-resolved  4. Diabetes mellitus  5. Coronary artery disease      RECOMMENDATION:   · Continue cefepime  · Tentative stop date for cefepime July 22, 2022  · Discussed with SELENA Gonzalez.  She was already aware of the tender IV site when she flushes IV this morning and plans to remove that.  Patient has a single-lumen PICC line in place on the right        Lillian Pike MD  07/10/22  09:13 CDT

## 2022-07-10 NOTE — CONSULTS
[facility] HEART GROUP CONSULT NOTE    Referring Provider: ADITYA Gallego    Reason for Consultation: Recurrent Atrial Fibrillation with RVR    Chief complaint:   Chief Complaint   Patient presents with   • RE-EVALUATION           History of present illness:   Mr. Elijah Escobar is a 66 y.o. male with history of right craniotomy for brain tumor on 06/16/2022 with craniectomy with incisional washout on 07/01/2022 who went into recurrent atrial fibrillation with RVR last night. He has PMHx atrial fibrillation, as he was on Sotalol in the past, but was discontinued on 06/18/2022 by Dr. Rubi. Patient has PMHx DM, Uncontrolled HTN, CAD with PCI to RCA in the past, HLD, and GERD.     Due to recurrent atrial fibrillation with long QTc, cardiology has been consulted for management.       History  Past Medical History:   Diagnosis Date   • Brain tumor (HCC)    • Coronary artery disease    • COVID-19 vaccine series completed     MODERNA X 3; LAST DOSE 3/2022   • Diabetes (HCC)    • Erectile dysfunction    • GERD (gastroesophageal reflux disease)    • Hypercholesteremia    • Hypertension    ,   Past Surgical History:   Procedure Laterality Date   • BALLOON ANGIOPLASTY, ARTERY Right    • COLONOSCOPY     • CRANIECTOMY Right 7/1/2022    Procedure: CRANIECTOMY WITH INCISIONAL WASHOUT;  Surgeon: Felipe Banerjee MD;  [facility]  Service: Neurosurgery;  Laterality: Right;   • CRANIOTOMY FOR TUMOR Right 6/16/2022    Procedure: CRANIOTOMY FOR TUMOR STERIOTACTIC WITH BRAIN LAB, right;  Surgeon: Flaco Portillo MD;  Location:  [facility] OR;  Service: Neurosurgery;  Laterality: Right;   ,   Family History   Problem Relation Age of Onset   • Cancer Mother         liver   • Heart disease Father    ,   Social History     Tobacco Use   • Smoking status: Current Every Day Smoker     Packs/day: 0.50   • Smokeless tobacco: Never Used   Vaping Use   • Vaping Use: Never used   Substance Use Topics   • Alcohol use:  Never   • Drug use: Never   ,     Medications  Current Facility-Administered Medications   Medication Dose Route Frequency Provider Last Rate Last Admin   • acetaminophen (TYLENOL) tablet 650 mg  650 mg Oral Q4H PRN Rahul Arnold APRN   650 mg at 07/01/22 1555    Or   • acetaminophen (TYLENOL) 160 MG/5ML solution 650 mg  650 mg Oral Q4H PRN Rahul Arnold APRN        Or   • acetaminophen (TYLENOL) suppository 650 mg  650 mg Rectal Q4H PRN Rahul Arnold APRN       • amLODIPine (NORVASC) tablet 10 mg  10 mg Oral Q24H Angel Sarabia MD   10 mg at 07/10/22 0852   • atorvastatin (LIPITOR) tablet 20 mg  20 mg Oral Nightly Rahul Arnold APRN   20 mg at 07/09/22 2048   • sennosides-docusate (PERICOLACE) 8.6-50 MG per tablet 2 tablet  2 tablet Oral BID Rahul Arnold APRN   2 tablet at 07/09/22 2048    And   • polyethylene glycol (MIRALAX) packet 17 g  17 g Oral Daily PRN Rahul Aronld APRN        And   • bisacodyl (DULCOLAX) EC tablet 5 mg  5 mg Oral Daily PRN Rahul Arnold APRN        And   • bisacodyl (DULCOLAX) suppository 10 mg  10 mg Rectal Daily PRN Rahul Arnold APRN       • cefepime (MAXIPIME) 2 g/100 mL 0.9% NS (mbp)  2 g Intravenous Q8H Edwin Santos MD   2 g at 07/10/22 0557   • dextrose (D50W) (25 g/50 mL) IV injection 25 g  25 g Intravenous Q15 Min PRN Rahul Arnold APRN       • dextrose (GLUTOSE) oral gel 15 g  15 g Oral Q15 Min PRN Rahul Arnold APRN       • dilTIAZem (CARDIZEM) 125 mg in 125 mL 0.7% sodium chloride  infusion  5-15 mg/hr Intravenous Titrated Angel Sarabia MD 10 mL/hr at 07/10/22 1034 10 mg/hr at 07/10/22 1034   • famotidine (PEPCID) tablet 40 mg  40 mg Oral Daily Rahul Arnold APRN   40 mg at 07/10/22 0853   • gabapentin (NEURONTIN) capsule 300 mg  300 mg Oral TID Rahul Arnold APRN   300 mg at 07/10/22 0857   • glucagon (human recombinant) (GLUCAGEN DIAGNOSTIC) injection 1 mg  1 mg Intramuscular Q15 Min PRN Rahul Arnold APRN       • insulin detemir  (LEVEMIR) injection 30 Units  30 Units Subcutaneous Nightly Angel Sarabia MD   30 Units at 07/09/22 2102   • Insulin Lispro (humaLOG) injection 0-14 Units  0-14 Units Subcutaneous 4x Daily AC & at Bedtime Rahul Arnold APRN   3 Units at 07/10/22 0901   • isosorbide mononitrate (IMDUR) 24 hr tablet 60 mg  60 mg Oral Q24H Angel Sarabia MD   60 mg at 07/10/22 0852   • labetalol (NORMODYNE,TRANDATE) injection 10 mg  10 mg Intravenous Q6H PRN Rahul Arnold APRN   10 mg at 07/10/22 0615   • levETIRAcetam (KEPPRA) tablet 500 mg  500 mg Oral BID Rahul Arnold APRN   500 mg at 07/10/22 0852   • lisinopril (PRINIVIL,ZESTRIL) tablet 40 mg  40 mg Oral Daily Rahul Arnold APRN   40 mg at 07/10/22 0853   • niCARdipine (CARDENE) 25 mg in 250 mL NS infusion  5-15 mg/hr Intravenous Titrated Angel Sarabia MD   Stopped at 07/09/22 0805   • ondansetron (ZOFRAN) tablet 4 mg  4 mg Oral Q6H PRN Rahul Arnold APRN        Or   • ondansetron (ZOFRAN) injection 4 mg  4 mg Intravenous Q6H PRN Rahul Arnold APRN       • oxyCODONE-acetaminophen (PERCOCET)  MG per tablet 1 tablet  1 tablet Oral Q4H PRN Rahul Arnold APRN   1 tablet at 07/09/22 1738   • oxyCODONE-acetaminophen (PERCOCET) 7.5-325 MG per tablet 1 tablet  1 tablet Oral Q4H PRN Rahul Arnold APRN   1 tablet at 07/10/22 0040   • sodium chloride 0.9 % flush 10 mL  10 mL Intravenous Q12H Rahul Arnold APRN   10 mL at 07/10/22 0904   • sodium chloride 0.9 % flush 10 mL  10 mL Intravenous PRN Rahul Arnold APRN       • sotalol (BETAPACE) tablet 80 mg  80 mg Oral BID Angel Sarabia MD   80 mg at 07/10/22 5065       Allergies:  Patient has no known allergies.    REVIEW OF SYSTEMS:  Constitutional: Denies fever or chills. Denies change in energy level or malaise.  HEENT: Denies headaches or visual disturbances. Denies difficulty swallowing or sore throat.  Respiratory: Denies cough or hoarseness. Denies shortness of breath.  "  Cardiovascular: Denies chest pain or pressure. Denies palpitations. Denies presyncope/syncope. Denies orthopnea/PND. Denies lower extremity edema. Patient states he didn't have atrial fibrillation until brain surgery, but has been on Sotalol. Denies any palpitations.   Gastrointestinal: Denies abdominal pain. Denies nausea/vomiting. Denies change in bowel habits or history of recent GI tract blood loss.   Genitourinary: Denies urinary urgency or frequency. Denies dysuria, hematuria.  Musculoskeletal: Denies pain or swelling in joints.  Neurological: Denies paresthesias. Denies headache. Denies seizure or stroke symptoms.  Behavioral/Psych: Denies problems with anxiety or depression.      All other systems are negative except where stated above.        Objective     Physical Exam:    /75   Pulse (!) 123   Temp 98.3 °F (36.8 °C) (Oral)   Resp 23   Ht 177.8 cm (70\")   Wt 118 kg (260 lb 9.3 oz)   SpO2 95%   BMI 37.39 kg/m²        General Appearance:    Alert, cooperative, in no acute distress   HEENT:    Normocephalic. Atraumatic. ERNESTINA. Scalp incision well approximated. Appears to be healing without evidence of erythema, drainage or warmth.   Neck:   No adenopathy, supple, trachea midline, no thyromegaly, no carotid bruit, no JVD   Lungs:     Clear to auscultation, respirations regular, even and unlabored    Heart:    Irregular rhythm and normal rate, normal S1 and S2, no murmur, no gallop, no rub, no click   Abdomen:     Normal bowel sounds, no masses, no organomegaly, soft, non-tender, non-distended, no guarding, no rebound tenderness   Rectal:     Deferred   Extremities:   Moves all extremities, no edema, no cyanosis, no redness   Skin:   No bleeding, bruising or rash   Lymph nodes:   No palpable adenopathy   Neurologic:   Grossly intact                Results Review:   I reviewed the patient's new clinical results.    Lab Results (last 72 hours)     Procedure Component Value Units Date/Time    POC " Glucose Once [[id number]]  (Abnormal) Collected: 07/10/22 0859    Specimen: Blood Updated: 07/10/22 0910     Glucose 171 mg/dL      Comment: : [id number] Conde ID: [id number]       Urine Culture - Urine, Urine, Random Void [[id number]]  (Normal) Collected: 07/09/22 1032    Specimen: Urine, Random Void Updated: 07/10/22 0654     Urine Culture No growth    Comprehensive Metabolic Panel [[id number]]  (Abnormal) Collected: 07/10/22 0219    Specimen: Blood Updated: 07/10/22 0259     Glucose 147 mg/dL      BUN 6 mg/dL      Creatinine 0.67 mg/dL      Sodium 138 mmol/L      Potassium 3.5 mmol/L      Chloride 105 mmol/L      CO2 22.0 mmol/L      Calcium 8.2 mg/dL      Total Protein 6.6 g/dL      Albumin 2.90 g/dL      ALT (SGPT) 9 U/L      AST (SGOT) 9 U/L      Alkaline Phosphatase 100 U/L      Total Bilirubin 0.3 mg/dL      Globulin 3.7 gm/dL      A/G Ratio 0.8 g/dL      BUN/Creatinine Ratio 9.0     Anion Gap 11.0 mmol/L      eGFR 103.0 mL/min/1.73      Comment: National Kidney Foundation and American Society of Nephrology (ASN) Task Force recommended calculation based on the Chronic Kidney Disease Epidemiology Collaboration (CKD-EPI) equation refit without adjustment for race.       Narrative:      GFR Normal >60  Chronic Kidney Disease <60  Kidney Failure <15      CBC & Differential [[id number]]  (Abnormal) Collected: 07/10/22 0219    Specimen: Blood Updated: 07/10/22 0237    Narrative:      The following orders were created for panel order CBC & Differential.  Procedure                               Abnormality         Status                     ---------                               -----------         ------                     CBC Auto Differential[[id number]]        Abnormal            Final result                 Please view results for these tests on the individual orders.    CBC Auto Differential [[id number]]  (Abnormal) Collected: 07/10/22 0219    Specimen: Blood Updated: 07/10/22 0237     WBC 6.96  10*3/mm3      RBC 3.18 10*6/mm3      Hemoglobin 9.6 g/dL      Hematocrit 30.3 %      MCV 95.3 fL      MCH 30.2 pg      MCHC 31.7 g/dL      RDW 12.1 %      RDW-SD 42.2 fl      MPV 9.4 fL      Platelets 259 10*3/mm3      Neutrophil % 62.0 %      Lymphocyte % 26.1 %      Monocyte % 9.3 %      Eosinophil % 1.3 %      Basophil % 0.6 %      Immature Grans % 0.7 %      Neutrophils, Absolute 4.31 10*3/mm3      Lymphocytes, Absolute 1.82 10*3/mm3      Monocytes, Absolute 0.65 10*3/mm3      Eosinophils, Absolute 0.09 10*3/mm3      Basophils, Absolute 0.04 10*3/mm3      Immature Grans, Absolute 0.05 10*3/mm3      nRBC 0.0 /100 WBC     POC Glucose Once [[id number]]  (Abnormal) Collected: 07/09/22 2056    Specimen: Blood Updated: 07/09/22 2108     Glucose 168 mg/dL      Comment: : [id number] Hansenangel Diaanirudh TMeter ID: [id number]       POC Glucose Once [[id number]]  (Abnormal) Collected: 07/09/22 1656    Specimen: Blood Updated: 07/09/22 1708     Glucose 182 mg/dL      Comment: : [id number] Reasor NathanMeter ID: [id number]       POC Glucose Once [[id number]]  (Abnormal) Collected: 07/09/22 1116    Specimen: Blood Updated: 07/09/22 1127     Glucose 178 mg/dL      Comment: : [id number] Reasor NathanMeter ID: [id number]       Urinalysis, Microscopic Only - Urine, Random Void [[id number]]  (Abnormal) Collected: 07/09/22 1032    Specimen: Urine, Random Void Updated: 07/09/22 1115     RBC, UA None Seen /HPF      WBC, UA 31-50 /HPF      Bacteria, UA None Seen /HPF      Squamous Epithelial Cells, UA 0-2 /HPF      Methodology Manual Light Microscopy    Urinalysis With Culture If Indicated - Urine, Random Void [[id number]]  (Abnormal) Collected: 07/09/22 1032    Specimen: Urine, Random Void Updated: 07/09/22 1115     Color, UA Dark Yellow     Appearance, UA Cloudy     pH, UA 5.5     Specific Gravity, UA 1.024     Glucose,  mg/dL (1+)     Ketones, UA 80 mg/dL (3+)     Bilirubin, UA Negative     Blood, UA Trace     Protein, UA  30 mg/dL (1+)     Leuk Esterase, UA Moderate (2+)     Nitrite, UA Negative     Urobilinogen, UA 1.0 E.U./dL    Narrative:      In absence of clinical symptoms, the presence of pyuria, bacteria, and/or nitrites on the urinalysis result does not correlate with infection.    POC Glucose Once [[id number]]  (Abnormal) Collected: 07/09/22 0749    Specimen: Blood Updated: 07/09/22 0802     Glucose 206 mg/dL      Comment: : [id number] Marin NathanMeter ID: [id number]       POC Glucose Once [[id number]]  (Abnormal) Collected: 07/08/22 2114    Specimen: Blood Updated: 07/08/22 2125     Glucose 190 mg/dL      Comment: : [id number] Estrellita TimothyMeter ID: [id number]       POC Glucose Once [[id number]]  (Abnormal) Collected: 07/08/22 1826    Specimen: Blood Updated: 07/08/22 1837     Glucose 226 mg/dL      Comment: : [id number] Damon Guaman AMeter ID: [id number]       POC Glucose Once [[id number]]  (Abnormal) Collected: 07/08/22 1229    Specimen: Blood Updated: 07/08/22 1240     Glucose 254 mg/dL      Comment: : [id number] Damon Mchughhanie AMeter ID: [id number]       POC Glucose Once [[id number]]  (Abnormal) Collected: 07/08/22 0602    Specimen: Blood Updated: 07/08/22 0625     Glucose 202 mg/dL      Comment: : [id number] Александр LoriMeter ID: [id number]       POC Glucose Once [[id number]]  (Abnormal) Collected: 07/08/22 0023    Specimen: Blood Updated: 07/08/22 0038     Glucose 203 mg/dL      Comment: : [id number] Александр LoriMeter ID: [id number]       POC Glucose Once [[id number]]  (Abnormal) Collected: 07/07/22 1809    Specimen: Blood Updated: 07/07/22 1822     Glucose 134 mg/dL      Comment: : [id number] Carroll BilliMeter ID: [id number]       POC Glucose Once [[id number]]  (Abnormal) Collected: 07/07/22 1709    Specimen: Blood Updated: 07/07/22 1721     Glucose 145 mg/dL      Comment: : [id number] Blaine Griffin (Gard) ID: [id number]       POC Glucose Once [[id number]]  (Abnormal) Collected:  07/07/22 1609    Specimen: Blood Updated: 07/07/22 1621     Glucose 166 mg/dL      Comment: : [id number] Carroll BilliMeter ID: [id number]       POC Glucose Once [[id number]]  (Abnormal) Collected: 07/07/22 1505    Specimen: Blood Updated: 07/07/22 1527     Glucose 148 mg/dL      Comment: : [id number] Carroll BilliMeter ID: [id number]       POC Glucose Once [[id number]]  (Abnormal) Collected: 07/07/22 1404    Specimen: Blood Updated: 07/07/22 1415     Glucose 170 mg/dL      Comment: : [id number] Carroll BilliMeter ID: [id number]       POC Glucose Once [[id number]]  (Abnormal) Collected: 07/07/22 1143    Specimen: Blood Updated: 07/07/22 1154     Glucose 132 mg/dL      Comment: : [id number] Carroll BilliMeter ID: [id number]           2D Echocardiogram (06/19/2022):  · Left ventricular ejection fraction appears to be 56 - 60%. Left ventricular systolic function is normal.  · Left ventricular wall thickness is consistent with mild concentric hypertrophy.  · Normal right ventricular cavity size and systolic function noted.  · There is no significant (greater than mild) valvular dysfunction.          Assessment & Plan       Right Frontal/Temporal Headache 2' to Infected Cranial Wound - S/P Cranial Wound I&D with [facility], Craniectomy on 07/01/2022 by Dr. Felipe Banerjee    Paroxysmal atrial fibrillation with rapid ventricular response (HCC) - Currently on Cardizem Drip. Unable to anticoagulate 2' to recent cranial surgery.     Coronary artery disease with PMHx PCI to RCA    Type 2 diabetes mellitus with hyperglycemia and peripheral neuropathy, with long-term current use of insulin (HCC)    GERD (gastroesophageal reflux disease)         Question of resuming Sotalol with prolonged QTc that is similar to previous measurements.     Further orders per Dr. Shubham Kc.     Thank you for asking us to follow this patient with you. Please note that this documentation resulted in a face-to-face encounter provided by me.        Sarai Demarco APRN

## 2022-07-11 ENCOUNTER — HOSPITAL ENCOUNTER (OUTPATIENT)
Facility: HOSPITAL | Age: 67
Discharge: HOME | End: 2022-07-25
Attending: INTERNAL MEDICINE | Admitting: INTERNAL MEDICINE

## 2022-07-11 VITALS
TEMPERATURE: 98.8 F | HEART RATE: 127 BPM | DIASTOLIC BLOOD PRESSURE: 84 MMHG | HEIGHT: 70 IN | OXYGEN SATURATION: 97 % | SYSTOLIC BLOOD PRESSURE: 148 MMHG | WEIGHT: 260.58 LBS | BODY MASS INDEX: 37.31 KG/M2 | RESPIRATION RATE: 17 BRPM

## 2022-07-11 LAB
GLUCOSE BLDC GLUCOMTR-MCNC: 152 MG/DL (ref 70–130)
GLUCOSE BLDC GLUCOMTR-MCNC: 186 MG/DL (ref 70–130)
GLUCOSE BLDC GLUCOMTR-MCNC: 203 MG/DL (ref 70–130)
GLUCOSE BLDC GLUCOMTR-MCNC: 207 MG/DL (ref 70–130)
GLUCOSE BLDC GLUCOMTR-MCNC: 216 MG/DL (ref 70–130)
QT INTERVAL: 342 MS
QTC INTERVAL: 487 MS

## 2022-07-11 PROCEDURE — 93005 ELECTROCARDIOGRAM TRACING: CPT | Performed by: INTERNAL MEDICINE

## 2022-07-11 PROCEDURE — 99233 SBSQ HOSP IP/OBS HIGH 50: CPT | Performed by: INTERNAL MEDICINE

## 2022-07-11 PROCEDURE — 25010000002 CEFEPIME PER 500 MG: Performed by: INTERNAL MEDICINE

## 2022-07-11 PROCEDURE — 97116 GAIT TRAINING THERAPY: CPT

## 2022-07-11 PROCEDURE — 82962 GLUCOSE BLOOD TEST: CPT

## 2022-07-11 PROCEDURE — 63710000001 INSULIN DETEMIR PER 5 UNITS: Performed by: INTERNAL MEDICINE

## 2022-07-11 PROCEDURE — 63710000001 INSULIN LISPRO (HUMAN) PER 5 UNITS: Performed by: NURSE PRACTITIONER

## 2022-07-11 PROCEDURE — 99024 POSTOP FOLLOW-UP VISIT: CPT | Performed by: NURSE PRACTITIONER

## 2022-07-11 PROCEDURE — 63710000001 INSULIN LISPRO (HUMAN) PER 5 UNITS: Performed by: INTERNAL MEDICINE

## 2022-07-11 RX ORDER — METOPROLOL TARTRATE 50 MG/1
50 TABLET, FILM COATED ORAL EVERY 12 HOURS SCHEDULED
Status: DISCONTINUED | OUTPATIENT
Start: 2022-07-11 | End: 2022-07-11 | Stop reason: HOSPADM

## 2022-07-11 RX ORDER — ISOSORBIDE MONONITRATE 30 MG/1
60 TABLET, EXTENDED RELEASE ORAL
Status: DISCONTINUED | OUTPATIENT
Start: 2022-07-12 | End: 2022-07-25 | Stop reason: HOSPADM

## 2022-07-11 RX ORDER — METOPROLOL TARTRATE 50 MG/1
50 TABLET, FILM COATED ORAL EVERY 12 HOURS SCHEDULED
Status: DISCONTINUED | OUTPATIENT
Start: 2022-07-11 | End: 2022-07-25 | Stop reason: HOSPADM

## 2022-07-11 RX ORDER — ATORVASTATIN CALCIUM 10 MG/1
20 TABLET, FILM COATED ORAL NIGHTLY
Status: DISCONTINUED | OUTPATIENT
Start: 2022-07-11 | End: 2022-07-25 | Stop reason: HOSPADM

## 2022-07-11 RX ORDER — SODIUM CHLORIDE 0.9 % (FLUSH) 0.9 %
10 SYRINGE (ML) INJECTION AS NEEDED
Status: DISCONTINUED | OUTPATIENT
Start: 2022-07-11 | End: 2022-07-25 | Stop reason: HOSPADM

## 2022-07-11 RX ORDER — ACETAMINOPHEN 325 MG/1
650 TABLET ORAL EVERY 4 HOURS PRN
Status: ON HOLD
Start: 2022-07-11

## 2022-07-11 RX ORDER — AMOXICILLIN 250 MG
2 CAPSULE ORAL 2 TIMES DAILY
Status: ON HOLD
Start: 2022-07-11 | End: 2022-10-05

## 2022-07-11 RX ORDER — DEXTROSE MONOHYDRATE 25 G/50ML
25 INJECTION, SOLUTION INTRAVENOUS
Status: DISCONTINUED | OUTPATIENT
Start: 2022-07-11 | End: 2022-07-25 | Stop reason: HOSPADM

## 2022-07-11 RX ORDER — OXYCODONE AND ACETAMINOPHEN 7.5; 325 MG/1; MG/1
1 TABLET ORAL ONCE
Status: COMPLETED | OUTPATIENT
Start: 2022-07-11 | End: 2022-07-11

## 2022-07-11 RX ORDER — DILTIAZEM HCL IN NACL,ISO-OSM 125 MG/125
5-15 PLASTIC BAG, INJECTION (ML) INTRAVENOUS
Status: ON HOLD
Start: 2022-07-11 | End: 2022-10-04

## 2022-07-11 RX ORDER — ASPIRIN 81 MG/1
81 TABLET, CHEWABLE ORAL DAILY
Status: DISCONTINUED | OUTPATIENT
Start: 2022-07-12 | End: 2022-07-25 | Stop reason: HOSPADM

## 2022-07-11 RX ORDER — ONDANSETRON 4 MG/1
4 TABLET, FILM COATED ORAL EVERY 6 HOURS PRN
Status: ON HOLD
Start: 2022-07-11 | End: 2022-10-05

## 2022-07-11 RX ORDER — NICOTINE POLACRILEX 4 MG
15 LOZENGE BUCCAL
Start: 2022-07-11 | End: 2023-01-25

## 2022-07-11 RX ORDER — DEXTROSE MONOHYDRATE 25 G/50ML
25 INJECTION, SOLUTION INTRAVENOUS
Status: ON HOLD
Start: 2022-07-11 | End: 2022-10-05

## 2022-07-11 RX ORDER — LEVETIRACETAM 500 MG/1
500 TABLET ORAL 2 TIMES DAILY
Status: DISCONTINUED | OUTPATIENT
Start: 2022-07-11 | End: 2022-07-25 | Stop reason: HOSPADM

## 2022-07-11 RX ORDER — AMLODIPINE BESYLATE 10 MG/1
10 TABLET ORAL
Status: DISCONTINUED | OUTPATIENT
Start: 2022-07-12 | End: 2022-07-11 | Stop reason: SDUPTHER

## 2022-07-11 RX ORDER — LISINOPRIL 20 MG/1
40 TABLET ORAL DAILY
Status: DISCONTINUED | OUTPATIENT
Start: 2022-07-12 | End: 2022-07-25 | Stop reason: HOSPADM

## 2022-07-11 RX ORDER — METOPROLOL TARTRATE 50 MG/1
50 TABLET, FILM COATED ORAL EVERY 12 HOURS SCHEDULED
Start: 2022-07-11 | End: 2022-10-07 | Stop reason: HOSPADM

## 2022-07-11 RX ORDER — NICOTINE 21 MG/24HR
1 PATCH, TRANSDERMAL 24 HOURS TRANSDERMAL DAILY PRN
Status: ON HOLD
Start: 2022-07-11 | End: 2023-03-31

## 2022-07-11 RX ORDER — NICOTINE 21 MG/24HR
1 PATCH, TRANSDERMAL 24 HOURS TRANSDERMAL DAILY PRN
Status: DISCONTINUED | OUTPATIENT
Start: 2022-07-11 | End: 2022-07-11 | Stop reason: HOSPADM

## 2022-07-11 RX ORDER — BISACODYL 5 MG/1
5 TABLET, DELAYED RELEASE ORAL DAILY PRN
Status: ON HOLD
Start: 2022-07-11 | End: 2022-10-05

## 2022-07-11 RX ORDER — POLYETHYLENE GLYCOL 3350 17 G/17G
17 POWDER, FOR SOLUTION ORAL DAILY PRN
Status: DISCONTINUED | OUTPATIENT
Start: 2022-07-11 | End: 2022-07-25 | Stop reason: HOSPADM

## 2022-07-11 RX ORDER — ONDANSETRON 4 MG/1
4 TABLET, FILM COATED ORAL EVERY 6 HOURS PRN
Status: DISCONTINUED | OUTPATIENT
Start: 2022-07-11 | End: 2022-07-25 | Stop reason: HOSPADM

## 2022-07-11 RX ORDER — DILTIAZEM HCL IN NACL,ISO-OSM 125 MG/125
5-15 PLASTIC BAG, INJECTION (ML) INTRAVENOUS
Status: DISPENSED | OUTPATIENT
Start: 2022-07-11 | End: 2022-07-12

## 2022-07-11 RX ORDER — SODIUM CHLORIDE 0.9 % (FLUSH) 0.9 %
10 SYRINGE (ML) INJECTION EVERY 12 HOURS SCHEDULED
Status: DISCONTINUED | OUTPATIENT
Start: 2022-07-11 | End: 2022-07-25 | Stop reason: HOSPADM

## 2022-07-11 RX ORDER — ACETAMINOPHEN 325 MG/1
650 TABLET ORAL EVERY 4 HOURS PRN
Status: DISCONTINUED | OUTPATIENT
Start: 2022-07-11 | End: 2022-07-25 | Stop reason: HOSPADM

## 2022-07-11 RX ORDER — BISACODYL 10 MG
10 SUPPOSITORY, RECTAL RECTAL DAILY PRN
Status: ON HOLD
Start: 2022-07-11 | End: 2022-10-05

## 2022-07-11 RX ORDER — ASPIRIN 81 MG/1
81 TABLET, CHEWABLE ORAL DAILY
Status: DISCONTINUED | OUTPATIENT
Start: 2022-07-11 | End: 2022-07-11 | Stop reason: HOSPADM

## 2022-07-11 RX ORDER — FAMOTIDINE 40 MG/1
40 TABLET, FILM COATED ORAL DAILY
Start: 2022-07-12 | End: 2022-09-26 | Stop reason: ALTCHOICE

## 2022-07-11 RX ORDER — SACCHAROMYCES BOULARDII 250 MG
250 CAPSULE ORAL 2 TIMES DAILY
Status: DISCONTINUED | OUTPATIENT
Start: 2022-07-11 | End: 2022-07-25 | Stop reason: HOSPADM

## 2022-07-11 RX ORDER — INSULIN LISPRO 100 [IU]/ML
0-14 INJECTION, SOLUTION INTRAVENOUS; SUBCUTANEOUS
Status: DISCONTINUED | OUTPATIENT
Start: 2022-07-11 | End: 2022-07-25 | Stop reason: HOSPADM

## 2022-07-11 RX ORDER — LABETALOL HYDROCHLORIDE 5 MG/ML
10 INJECTION, SOLUTION INTRAVENOUS EVERY 6 HOURS PRN
Status: ON HOLD
Start: 2022-07-11 | End: 2022-10-04

## 2022-07-11 RX ORDER — BISACODYL 10 MG
10 SUPPOSITORY, RECTAL RECTAL DAILY PRN
Status: DISCONTINUED | OUTPATIENT
Start: 2022-07-11 | End: 2022-07-25 | Stop reason: HOSPADM

## 2022-07-11 RX ORDER — ISOSORBIDE MONONITRATE 60 MG/1
60 TABLET, EXTENDED RELEASE ORAL
Start: 2022-07-12 | End: 2023-01-25

## 2022-07-11 RX ORDER — GABAPENTIN 300 MG/1
300 CAPSULE ORAL 3 TIMES DAILY
Status: DISCONTINUED | OUTPATIENT
Start: 2022-07-11 | End: 2022-07-25 | Stop reason: HOSPADM

## 2022-07-11 RX ORDER — NICOTINE POLACRILEX 4 MG
15 LOZENGE BUCCAL
Status: DISCONTINUED | OUTPATIENT
Start: 2022-07-11 | End: 2022-07-25 | Stop reason: HOSPADM

## 2022-07-11 RX ORDER — NICOTINE 21 MG/24HR
1 PATCH, TRANSDERMAL 24 HOURS TRANSDERMAL
Status: DISCONTINUED | OUTPATIENT
Start: 2022-07-11 | End: 2022-07-11

## 2022-07-11 RX ORDER — ACETAMINOPHEN 650 MG/1
650 SUPPOSITORY RECTAL EVERY 4 HOURS PRN
Status: DISCONTINUED | OUTPATIENT
Start: 2022-07-11 | End: 2022-07-25 | Stop reason: HOSPADM

## 2022-07-11 RX ORDER — ONDANSETRON 2 MG/ML
4 INJECTION INTRAMUSCULAR; INTRAVENOUS EVERY 6 HOURS PRN
Status: DISCONTINUED | OUTPATIENT
Start: 2022-07-11 | End: 2022-07-25 | Stop reason: HOSPADM

## 2022-07-11 RX ORDER — LABETALOL HYDROCHLORIDE 5 MG/ML
10 INJECTION, SOLUTION INTRAVENOUS EVERY 6 HOURS PRN
Status: DISCONTINUED | OUTPATIENT
Start: 2022-07-11 | End: 2022-07-25 | Stop reason: HOSPADM

## 2022-07-11 RX ORDER — NICOTINE 21 MG/24HR
1 PATCH, TRANSDERMAL 24 HOURS TRANSDERMAL DAILY PRN
Status: DISCONTINUED | OUTPATIENT
Start: 2022-07-11 | End: 2022-07-13

## 2022-07-11 RX ORDER — POLYETHYLENE GLYCOL 3350 17 G/17G
17 POWDER, FOR SOLUTION ORAL DAILY PRN
Status: ON HOLD
Start: 2022-07-11 | End: 2022-10-05

## 2022-07-11 RX ORDER — ASPIRIN 81 MG/1
81 TABLET, CHEWABLE ORAL DAILY
Status: ON HOLD
Start: 2022-07-12 | End: 2023-03-31

## 2022-07-11 RX ORDER — FAMOTIDINE 20 MG/1
40 TABLET, FILM COATED ORAL DAILY
Status: DISCONTINUED | OUTPATIENT
Start: 2022-07-12 | End: 2022-07-25 | Stop reason: HOSPADM

## 2022-07-11 RX ORDER — DILTIAZEM HYDROCHLORIDE 120 MG/1
120 CAPSULE, COATED, EXTENDED RELEASE ORAL
Status: DISCONTINUED | OUTPATIENT
Start: 2022-07-12 | End: 2022-07-14

## 2022-07-11 RX ORDER — AMOXICILLIN 250 MG
2 CAPSULE ORAL DAILY
Status: DISCONTINUED | OUTPATIENT
Start: 2022-07-12 | End: 2022-07-25 | Stop reason: HOSPADM

## 2022-07-11 RX ORDER — BISACODYL 5 MG/1
5 TABLET, DELAYED RELEASE ORAL DAILY PRN
Status: DISCONTINUED | OUTPATIENT
Start: 2022-07-11 | End: 2022-07-25 | Stop reason: HOSPADM

## 2022-07-11 RX ORDER — AMLODIPINE BESYLATE 10 MG/1
10 TABLET ORAL
Status: ON HOLD
Start: 2022-07-12

## 2022-07-11 RX ORDER — INSULIN LISPRO 100 [IU]/ML
0-14 INJECTION, SOLUTION INTRAVENOUS; SUBCUTANEOUS
Refills: 12 | Status: ON HOLD
Start: 2022-07-11 | End: 2023-03-31

## 2022-07-11 RX ADMIN — NICOTINE 1 PATCH: 14 PATCH, EXTENDED RELEASE TRANSDERMAL at 00:44

## 2022-07-11 RX ADMIN — INSULIN LISPRO 5 UNITS: 100 INJECTION, SOLUTION INTRAVENOUS; SUBCUTANEOUS at 12:21

## 2022-07-11 RX ADMIN — ISOSORBIDE MONONITRATE 60 MG: 60 TABLET, EXTENDED RELEASE ORAL at 08:21

## 2022-07-11 RX ADMIN — INSULIN LISPRO 3 UNITS: 100 INJECTION, SOLUTION INTRAVENOUS; SUBCUTANEOUS at 08:20

## 2022-07-11 RX ADMIN — LISINOPRIL 40 MG: 20 TABLET ORAL at 08:20

## 2022-07-11 RX ADMIN — FAMOTIDINE 40 MG: 20 TABLET, FILM COATED ORAL at 08:20

## 2022-07-11 RX ADMIN — OXYCODONE HYDROCHLORIDE AND ACETAMINOPHEN 1 TABLET: 7.5; 325 TABLET ORAL at 04:41

## 2022-07-11 RX ADMIN — ACETAMINOPHEN 650 MG: 325 TABLET, FILM COATED ORAL at 08:28

## 2022-07-11 RX ADMIN — Medication 10 MG/HR: at 12:17

## 2022-07-11 RX ADMIN — METOPROLOL TARTRATE 50 MG: 25 TABLET, FILM COATED ORAL at 08:24

## 2022-07-11 RX ADMIN — CEFEPIME HYDROCHLORIDE 2 G: 2 INJECTION, POWDER, FOR SOLUTION INTRAVENOUS at 14:18

## 2022-07-11 RX ADMIN — ASPIRIN 81 MG: 81 TABLET, CHEWABLE ORAL at 12:16

## 2022-07-11 RX ADMIN — AMLODIPINE BESYLATE 10 MG: 10 TABLET ORAL at 08:24

## 2022-07-11 RX ADMIN — LABETALOL HYDROCHLORIDE 10 MG: 5 INJECTION INTRAVENOUS at 02:07

## 2022-07-11 RX ADMIN — LEVETIRACETAM 500 MG: 500 TABLET, FILM COATED ORAL at 08:22

## 2022-07-11 RX ADMIN — GABAPENTIN 300 MG: 300 CAPSULE ORAL at 08:23

## 2022-07-11 RX ADMIN — Medication 5 MG/HR: at 02:29

## 2022-07-11 RX ADMIN — CEFEPIME HYDROCHLORIDE 2 G: 2 INJECTION, POWDER, FOR SOLUTION INTRAVENOUS at 06:01

## 2022-07-11 NOTE — THERAPY DISCHARGE NOTE
Acute Care - Physical Therapy Discharge Summary  [facility]       Patient Name: Elijah Escobar  : 1955  MRN: [id number]    Today's Date: 2022                 Admit Date: 2022      PT Recommendation and Plan    Visit Dx:    ICD-10-CM ICD-9-CM   1. Postoperative infection, unspecified type, initial encounter  T81.40XA 998.59   2. S/P craniotomy  Z98.890 V45.89   3. Impaired mobility and ADLs  Z74.09 V49.89    Z78.9    4. Impaired mobility  Z74.09 799.89        Outcome Measures     Row Name 22 0846 22 1112          How much help from another person do you currently need...    Turning from your back to your side while in flat bed without using bedrails? 3  -CHRISTIANO 3  -CHRISTIANO     Moving from lying on back to sitting on the side of a flat bed without bedrails? 3  -CHRISTIANO 3  -CHRISTIANO     Moving to and from a bed to a chair (including a wheelchair)? 3  -CHRISTIANO 3  -CHRISTIANO     Standing up from a chair using your arms (e.g., wheelchair, bedside chair)? 3  -CHRISTIANO 3  -CHRISTIANO     Climbing 3-5 steps with a railing? 2  -CHRISTIANO 2  -CHRISTIANO     To walk in hospital room? 3  -CHRISTIANO 3  -CHRISTIANO     AM-PAC 6 Clicks Score (PT) 17  -CHRISTIANO 17  -CHRISTIANO            Functional Assessment    Outcome Measure Options AM-PAC 6 Clicks Basic Mobility (PT)  -CHRISTIANO AM-PAC 6 Clicks Basic Mobility (PT)  -CHRISTIANO           User Key  (r) = Recorded By, (t) = Taken By, (c) = Cosigned By    Initials Name Provider Type    Александр Morley PTA Physical Therapist Assistant                 PT Charges     Row Name 22 0846             Time Calculation    Start Time 0846  -CHRISTIANO      Stop Time 0910  -CHRISTIANO      Time Calculation (min) 24 min  -CHRISTIANO      PT Received On 22  -CHRISTIANO              Time Calculation- PT    Total Timed Code Minutes- PT 24 minute(s)  -CHRISTIANO              Timed Charges    15977 - Gait Training Minutes  24  -CHRISTIANO              Total Minutes    Timed Charges Total Minutes 24  -CHRISTIANO       Total Minutes 24  -CHRISTIANO            User Key  (r) = Recorded By, (t) = Taken By, (c) = Cosigned  By    Initials Name Provider Type    Александр Morley PTA Physical Therapist Assistant                 PT Rehab Goals     Row Name 07/11/22 1533             Bed Mobility Goal 1 (PT)    Activity/Assistive Device (Bed Mobility Goal 1, PT) bed mobility activities, all  -NW      Tony Level/Cues Needed (Bed Mobility Goal 1, PT) independent  -NW      Time Frame (Bed Mobility Goal 1, PT) 10 days  -NW      Progress/Outcomes (Bed Mobility Goal 1, PT) goal not met  -NW              Transfer Goal 1 (PT)    Activity/Assistive Device (Transfer Goal 1, PT) transfers, all  -NW      Tony Level/Cues Needed (Transfer Goal 1, PT) independent  -NW      Time Frame (Transfer Goal 1, PT) 10 days  -NW      Progress/Outcome (Transfer Goal 1, PT) goal not met  -NW              Gait Training Goal 1 (PT)    Activity/Assistive Device (Gait Training Goal 1, PT) gait (walking locomotion);assistive device use;decrease fall risk;improve balance and speed;increase endurance/gait distance;cane, straight  pt used cane in past  -NW      Tony Level (Gait Training Goal 1, PT) standby assist  -NW      Distance (Gait Training Goal 1, PT) 250  -NW      Time Frame (Gait Training Goal 1, PT) 10 days  -NW      Progress/Outcome (Gait Training Goal 1, PT) goal not met  -NW              Patient Education Goal (PT)    Activity (Patient Education Goal, PT) fall risk, use of AD as needed, increasing activity  -NW      Tony/Cues/Accuracy (Memory Goal 2, PT) demonstrates adequately;verbalizes understanding  -NW      Time Frame (Patient Education Goal, PT) 10 days  -NW      Progress/Outcome (Patient Education Goal, PT) goal met  -NW            User Key  (r) = Recorded By, (t) = Taken By, (c) = Cosigned By    Initials Name Provider Type Discipline    NW Raquel Alford PTA Physical Therapist Assistant PT                    PT Discharge Summary  Anticipated Discharge Disposition (PT): [facility] hospital)  Reason for  Discharge: Discharge from facility  Outcomes Achieved: Refer to plan of care for updates on goals achieved  Discharge Destination: [facility]OE[facility] Alford, PTA   7/11/2022

## 2022-07-11 NOTE — THERAPY TREATMENT NOTE
Patient Name: Elijah Escobar  : 1955    MRN: [id number]                              Today's Date: 2022       Admit Date: 2022    Visit Dx:     ICD-10-CM ICD-9-CM   1. Postoperative infection, unspecified type, initial encounter  T81.40XA 998.59   2. S/P craniotomy  Z98.890 V45.89   3. Impaired mobility and ADLs  Z74.09 V49.89    Z78.9    4. Impaired mobility  Z74.09 799.89     Patient Active Problem List   Diagnosis   • Meningioma s/p craniotomy   • Hypertension   • GERD (gastroesophageal reflux disease)   • Coronary artery disease   • Morbid obesity (HCC)   • Type 2 diabetes mellitus with hyperglycemia and peripheral neuropathy, with long-term current use of insulin (HCC)   • Leukocytosis   • Other specified anemias   • Paroxysmal atrial fibrillation with rapid ventricular response (HCC)   • Atrial fibrillation with RVR (HCC)   • Post-operative infection   • S/P craniotomy     Past Medical History:   Diagnosis Date   • Brain tumor (HCC)    • Coronary artery disease    • COVID-19 vaccine series completed     MODERNA X 3; LAST DOSE 3/2022   • Diabetes (HCC)    • Erectile dysfunction    • GERD (gastroesophageal reflux disease)    • Hypercholesteremia    • Hypertension      Past Surgical History:   Procedure Laterality Date   • BALLOON ANGIOPLASTY, ARTERY Right    • COLONOSCOPY     • CRANIECTOMY Right 2022    Procedure: CRANIECTOMY WITH INCISIONAL WASHOUT;  Surgeon: Felipe Banerjee MD;  Location:  [facility] OR;  Service: Neurosurgery;  Laterality: Right;   • CRANIOTOMY FOR TUMOR Right 2022    Procedure: CRANIOTOMY FOR TUMOR STERIOTACTIC WITH BRAIN LAB, right;  Surgeon: Flaco Portillo MD;  Location:  [facility] OR;  Service: Neurosurgery;  Laterality: Right;      General Information     Row Name 22 1135          OT Time and Intention    Document Type therapy note (daily note) (P)   -AD     Mode of Treatment occupational therapy (P)   -AD     Row Name 22 1135          General  Information    Patient Profile Reviewed yes (P)   -AD     Existing Precautions/Restrictions fall;other (see comments) (P)   helmet OOB  -AD           User Key  (r) = Recorded By, (t) = Taken By, (c) = Cosigned By    Initials Name Provider Type    AD Sara Florez, OT Student OT Student                 Mobility/ADL's     Row Name 07/11/22 1135          Bed Mobility    Bed Mobility supine-sit;sit-supine (P)   -AD     Supine-Sit [place] (Bed Mobility) minimum assist (75% patient effort);verbal cues (P)   -AD     Sit-Supine [place] (Bed Mobility) minimum assist (75% patient effort);verbal cues (P)   -AD     Assistive Device (Bed Mobility) head of bed elevated;bed rails (P)   -AD     Row Name 07/11/22 1135          Transfers    Transfers sit-stand transfer;stand-sit transfer (P)   -AD     Comment, (Transfers) Pt stood x4, with attempt x1 partial stand. Initial attempt, pt with all weight through heels, unable to stand completely. Vcs and education provided to improve standing ability. Successful sit<>stand following (P)   -AD     Sit-Stand [place] (Transfers) moderate assist (50% patient effort);contact guard;verbal cues (P)   -AD     Stand-Sit [place] (Transfers) verbal cues;contact guard (P)   -AD     Row Name 07/11/22 1135          Sit-Stand Transfer    Comment, (Sit-Stand Transfer) initial attempt mod A, following stands CGA (P)   -AD     Row Name 07/11/22 1135          Stand-Sit Transfer    Comment, (Stand-Sit Transfer) poor eccentric control (P)   -AD     Row Name 07/11/22 1135          Activities of Daily Living    BADL Assessment/Intervention lower body dressing (P)   -AD     Row Name 07/11/22 1135          Lower Body Dressing Assessment/Training    [place] Level (Lower Body Dressing) don;doff;socks;standby assist;verbal cues (P)   -AD     Position (Lower Body Dressing) edge of bed sitting (P)   -AD     Comment, (Lower Body Dressing) SBA for balance and vcs for PLB. Extended time  required to complete (P)   -AD           User Key  (r) = Recorded By, (t) = Taken By, (c) = Cosigned By    Initials Name Provider Type    Sara Davis, OT Student OT Student               Obj/Interventions     Row Name 07/11/22 1135          Balance    Balance Assessment sitting static balance;sitting dynamic balance;standing dynamic balance;standing static balance;sit to stand dynamic balance (P)   -AD     Static Sitting Balance standby assist (P)   -AD     Dynamic Sitting Balance contact guard (P)   -AD     Position, Sitting Balance sitting edge of bed;unsupported (P)   -AD     Sit to Stand Dynamic Balance contact guard (P)   -AD     Static Standing Balance minimal assist;contact guard (P)   -AD     Dynamic Standing Balance minimal assist;contact guard (P)   -AD     Position/Device Used, Standing Balance unsupported (P)   -AD     Balance Interventions sitting;standing;dynamic;dynamic reaching (P)   -AD     Comment, Balance Balance activities completed in standing and sitting to address safety in ADL completion and home mobility. Pt unsteady with standing balance tasks. Extended time required d/t pt needing frequent redirection. Standing tolerance addressed with standing dynamic reaching, pt with poor standing tolerance, fatiguing quickly requiring seated rest breaks (P)   -AD           User Key  (r) = Recorded By, (t) = Taken By, (c) = Cosigned By    Initials Name Provider Type    Sara Davis, OT Student OT Student               Goals/Plan    No documentation.                Clinical Impression     Row Name 07/11/22 1135          Pain Assessment    Pretreatment Pain Rating 10/10 (P)   -AD     Posttreatment Pain Rating 10/10 (P)   -AD     Pain Location - Side/Orientation Bilateral (P)   -AD     Pain Location lower (P)   -AD     Pain Location - extremity (P)   -AD     Pre/Posttreatment Pain Comment neuropathy pain (P)   -AD     Pain Intervention(s) Ambulation/increased activity;Repositioned;Rest;Nursing  "Notified (P)   -AD     Row Name 07/11/22 1135          Plan of Care Review    Plan of Care Reviewed With patient (P)   -AD     Outcome Evaluation OT tx complete. Pt alert and agreeable, however some confusion regarding hospital stay and required frequent redirection to task. Pt completed bed mobility with min A to come to complete sit. Pt sat EOB with helmet donned and donned/doffed socks, requiring extended time, and vcs for PLB. Pt completed balance activities sitting EOB performing dynamic reaching to improve safety and function with ADLs. Pt required frequent redirection with this task. Pt with difficulty with initial stand attempt, unable to transfer weight forward to complete stand. Education and vcs provided on avoiding putting all weight in the heels. Pt with successful stands with CGA following education and vcs. Pt completed sit<>stand x4 d/t needed sitting rest breaks with standing balance activity. Pt completed dynamic reaching activities in standing with CGA-min A and was unsteady. Pt fatigues quickly. Increased fatigue and increasing HR, pt returned to supine and was \"ready to rest and get medicine.\" Continue OT POC. (P)   -AD     Row Name 07/11/22 1135          Positioning and Restraints    Pre-Treatment Position in bed (P)   -AD     Post Treatment Position bed (P)   -AD     In Bed fowlers;call light within reach;encouraged to call for assist;exit alarm on;side rails up x3 (P)   -AD           User Key  (r) = Recorded By, (t) = Taken By, (c) = Cosigned By    Initials Name Provider Type    Sara Davis OT Student OT Student               Outcome Measures     Row Name 07/11/22 1135          How much help from another is currently needed...    Putting on and taking off regular lower body clothing? 3 (P)   -AD     Bathing (including washing, rinsing, and drying) 2 (P)   -AD     Toileting (which includes using toilet bed pan or urinal) 3 (P)   -AD     Putting on and taking off regular upper body " clothing 3 (P)   -AD     Taking care of personal grooming (such as brushing teeth) 3 (P)   -AD     Eating meals 3 (P)   -AD     AM-PAC 6 Clicks Score (OT) 17 (P)   -AD     Row Name 07/11/22 0846          How much help from another person do you currently need...    Turning from your back to your side while in flat bed without using bedrails? 3  -CHRISTIANO     Moving from lying on back to sitting on the side of a flat bed without bedrails? 3  -CHRISTIANO     Moving to and from a bed to a chair (including a wheelchair)? 3  -CHRISTIANO     Standing up from a chair using your arms (e.g., wheelchair, bedside chair)? 3  -CHRISTIANO     Climbing 3-5 steps with a railing? 2  -CHRISTIANO     To walk in hospital room? 3  -CHRISTIANO     AM-PAC 6 Clicks Score (PT) 17  -CHRISTIANO     Highest level of mobility 5 --> Static standing  -CHRISTIANO     Row Name 07/11/22 1135 07/11/22 0846       Functional Assessment    Outcome Measure Options AM-PAC 6 Clicks Daily Activity (OT) (P)   -AD AM-PAC 6 Clicks Basic Mobility (PT)  -CHRISTIANO          User Key  (r) = Recorded By, (t) = Taken By, (c) = Cosigned By    Initials Name Provider Type    Александр Morley PTA Physical Therapist Assistant    Sara Davis OT Student OT Student                Occupational Therapy Education                 Title: PT OT SLP Therapies (Done)     Topic: Occupational Therapy (Done)     Point: ADL training (Done)     Description:   Instruct learner(s) on proper safety adaptation and remediation techniques during self care or transfers.   Instruct in proper use of assistive devices.              Learning Progress Summary           Patient Acceptance, E, VU by AD at 7/11/2022 1154    Comment: PLB, weight shifting and distribution with standing   Family Acceptance, E,TB, VU,NR by SF at 7/5/2022 2250    Comment: connie called on the phone and also received education.      Show all documentation for this point (7)                 Point: Home exercise program (Done)     Description:   Instruct learner(s) on appropriate  technique for monitoring, assisting and/or progressing therapeutic exercises/activities.              Learning Progress Summary           Patient Acceptance, E, VU by AD at 7/11/2022 1154    Comment: PLB, weight shifting and distribution with standing   Family Acceptance, E,TB, VU,NR by  at 7/5/2022 2250    Comment: connie called on the phone and also received education.      Show all documentation for this point (7)                 Point: Precautions (Resolved)     Description:   Instruct learner(s) on prescribed precautions during self-care and functional transfers.              Learner Progress:  Not documented in this visit.          Point: Body mechanics (Resolved)     Description:   Instruct learner(s) on proper positioning and spine alignment during self-care, functional mobility activities and/or exercises.              Learner Progress:  Not documented in this visit.                      User Key     Initials Effective Dates Name Provider Type Discipline     06/16/21 -  Arlen Hansen RN Registered Nurse Nurse    AD 05/04/22 -  Sara Florez OT Student OT Student OT              OT Recommendation and Plan     Plan of Care Review  Plan of Care Reviewed With: (P) patient  Outcome Evaluation: (P) OT tx complete. Pt alert and agreeable, however some confusion regarding hospital stay and required frequent redirection to task. Pt completed bed mobility with min A to come to complete sit. Pt sat EOB with helmet donned and donned/doffed socks, requiring extended time, and vcs for PLB. Pt completed balance activities sitting EOB performing dynamic reaching to improve safety and function with ADLs. Pt required frequent redirection with this task. Pt with difficulty with initial stand attempt, unable to transfer weight forward to complete stand. Education and vcs provided on avoiding putting all weight in the heels. Pt with successful stands with CGA following education and vcs. Pt completed sit<>stand x4 d/t  "needed sitting rest breaks with standing balance activity. Pt completed dynamic reaching activities in standing with CGA-min A and was unsteady. Pt fatigues quickly. Increased fatigue and increasing HR, pt returned to supine and was \"ready to rest and get medicine.\" Continue OT POC.     Time Calculation:    Time Calculation- OT     Row Name 07/11/22 1135 07/11/22 0846          Time Calculation- OT    OT Start Time 1047 (P)   -AD --     OT Stop Time 1134 (P)   -AD --     OT Time Calculation (min) 47 min (P)   -AD --     Total Timed Code Minutes- OT 47 minute(s) (P)   -AD --     OT Received On 07/11/22 (P)   -AD --            Timed Charges    43361 - Gait Training Minutes  -- 24  -CHRISTIANO     58679 - OT Therapeutic Activity Minutes 36 (P)   -AD --     53762 - OT Self Care/Mgmt Minutes 11 (P)   -AD --            Total Minutes    Timed Charges Total Minutes 47 (P)   -AD 24  -CHRISTIANO      Total Minutes 47 (P)   -AD 24  -CHRISTIANO           User Key  (r) = Recorded By, (t) = Taken By, (c) = Cosigned By    Initials Name Provider Type    Александр Morley, PTA Physical Therapist Assistant    Sara Davis OT Student OT Student                       Sara Florez OT Student  7/11/2022  "

## 2022-07-11 NOTE — THERAPY TREATMENT NOTE
Acute Care - Physical Therapy Treatment Note  [facility]     Patient Name: Elijah Escobar  : 1955  MRN: [id number]  Today's Date: 2022      Visit Dx:     ICD-10-CM ICD-9-CM   1. Postoperative infection, unspecified type, initial encounter  T81.40XA 998.59   2. S/P craniotomy  Z98.890 V45.89   3. Impaired mobility and ADLs  Z74.09 V49.89    Z78.9    4. Impaired mobility  Z74.09 799.89     Patient Active Problem List   Diagnosis   • Meningioma s/p craniotomy   • Hypertension   • GERD (gastroesophageal reflux disease)   • Coronary artery disease   • Morbid obesity (HCC)   • Type 2 diabetes mellitus with hyperglycemia and peripheral neuropathy, with long-term current use of insulin (HCC)   • Leukocytosis   • Other specified anemias   • Paroxysmal atrial fibrillation with rapid ventricular response (HCC)   • Atrial fibrillation with RVR (HCC)   • Post-operative infection   • S/P craniotomy     Past Medical History:   Diagnosis Date   • Brain tumor (HCC)    • Coronary artery disease    • COVID-19 vaccine series completed     MODERNA X 3; LAST DOSE 3/2022   • Diabetes (HCC)    • Erectile dysfunction    • GERD (gastroesophageal reflux disease)    • Hypercholesteremia    • Hypertension      Past Surgical History:   Procedure Laterality Date   • BALLOON ANGIOPLASTY, ARTERY Right    • COLONOSCOPY     • CRANIECTOMY Right 2022    Procedure: CRANIECTOMY WITH INCISIONAL WASHOUT;  Surgeon: Felipe Banerjee MD;  Location: [facility] OR;  Service: Neurosurgery;  Laterality: Right;   • CRANIOTOMY FOR TUMOR Right 2022    Procedure: CRANIOTOMY FOR TUMOR STERIOTACTIC WITH BRAIN LAB, right;  Surgeon: Flaco Portillo MD;  Location: [facility] OR;  Service: Neurosurgery;  Laterality: Right;     PT Assessment (last 12 hours)     PT Evaluation and Treatment     Row Name 22 0846          Physical Therapy Time and Intention    Subjective Information complains of;weakness;fatigue  -CHRISTIANO     Document Type therapy note  (daily note)  -CHRISTIANO     Mode of Treatment physical therapy  -CHRISTIANO     Comment HR was in the 130's and BP elevated, but nsg stated ok to continue with therapy  -CHRISTIANO     Row Name 07/11/22 0846          General Information    Existing Precautions/Restrictions fall  helmet on when OOB  -CHRISTIANO     Row Name 07/11/22 0846          Pain    Pretreatment Pain Rating 0/10 - no pain  -CHRISTIANO     Posttreatment Pain Rating 0/10 - no pain  -CHRISTIANO     Row Name 07/11/22 0846          Bed Mobility    Supine-Sit [place] (Bed Mobility) verbal cues;minimum assist (75% patient effort)  -CHRISTIANO     Sit-Supine [place] (Bed Mobility) --  chair  -CHRISTIANO     Row Name 07/11/22 0846          Transfers    Sit-Stand [place] (Transfers) verbal cues;contact guard  -CHRISTIANO     Stand-Sit [place] (Transfers) verbal cues;contact guard  -CHRISTIANO     Row Name 07/11/22 0846          Gait/Stairs (Locomotion)    [place] Level (Gait) verbal cues;minimum assist (75% patient effort)  -CHRISTIANO     Assistive Device (Gait) cane, straight  -CHRISTIANO     Distance in Feet (Gait) 140' with one standing rest  -CHRISTIANO     Deviations/Abnormal Patterns (Gait) gait speed decreased;stride length decreased  -CHRISTIANO     Bilateral Gait Deviations forward flexed posture  -CHRISTIANO     Comment, (Gait/Stairs) pt had increase forward flexion as he fatigued and required cues for posture and balance. Pt continues to forget the current task and requires cues to be redirected  -CHRISTIANO     Row Name             Wound 06/16/22 0951 Right anterior scalp Incision    Wound - Properties Group Placement Date: 06/16/22  -JBA Placement Time: 0951 -JBA Side: Right  -JBA Orientation: anterior  -JBA Location: scalp  -JBA Primary Wound Type: Incision  -JBA     Retired Wound - Properties Group Placement Date: 06/16/22  -JBA Placement Time: 0951 -JBA Side: Right  -JBA Orientation: anterior  -JBA Location: scalp  -JBA Primary Wound Type: Incision  -JBA     Retired Wound - Properties Group Date first assessed: 06/16/22  -JBA Time  first assessed: 0951  -JBA Side: Right  -JBA Location: scalp  -JBA Primary Wound Type: Incision  -JBA     Row Name 07/11/22 0846          Positioning and Restraints    Pre-Treatment Position in bed  -CHRISTIANO     Post Treatment Position chair  -CHRISTIANO     In Chair reclined;call light within reach;encouraged to call for assist;exit alarm on;notified nsg  -CHRISTIANO           User Key  (r) = Recorded By, (t) = Taken By, (c) = Cosigned By    Initials Name Provider Type    Александр Morley, [place]LA[place] Physical Therapist Assistant    Ricky Watts, RN Registered Nurse                Physical Therapy Education                 Title: PT OT SLP Therapies (Done)     Topic: Physical Therapy (Done)     Point: Mobility training (Done)     Learning Progress Summary           Patient Acceptance, E, VU by  at 7/11/2022 0846    Comment: safety with amb   Family Acceptance, E,TB, VU,NR by  at 7/5/2022 2250    Comment: connie called on the phone and also received education.      Show all documentation for this point (8)                 Point: Home exercise program (Done)     Learning Progress Summary           Patient Acceptance, E,D, NR,Bed IU,NL by  at 7/10/2022 2204   Family Acceptance, E,TB, VU,NR by  at 7/5/2022 2250    Comment: connie called on the phone and also received education.      Show all documentation for this point (2)                 Point: Body mechanics (Done)     Learning Progress Summary           Patient Acceptance, E,D, NR,Bed IU,NL by  at 7/10/2022 2204   Family Acceptance, E,TB, VU,NR by  at 7/5/2022 2250    Comment: connie called on the phone and also received education.      Show all documentation for this point (2)                 Point: Precautions (Done)     Learning Progress Summary           Patient Acceptance, E,D, NR,Bed IU,NL by  at 7/10/2022 2204   Family Acceptance, E,TB, VU,NR by  at 7/5/2022 2250    Comment: connie called on the phone and also received education.      Show all documentation for  this point (2)                             User Key     Initials Effective Dates Name Provider Type Discipline    CHRISTIANO 12/08/16 -  Александр Saravia PTA Physical Therapist Assistant PT    SF 06/16/21 -  Arlen Hansen, RN Registered Nurse Nurse              PT Recommendation and Plan     Plan of Care Reviewed With: patient  Progress: improving  Outcome Evaluation: Pt was able to perform supine to sitting with min assist.  Donned Helmet at EOB.  Transfered sit to stand with CGA.  Amb 150' with straight cane and min assist/HHA, with one standing rest.  Pt required cues for posture and balance with amb.  Pt continues to forget the current task and requires redirection throughout the tx.  Pt also continues to need to be educated on current medical tx and plan.  Will continue working with pt to increase strength and safety with amb.   Outcome Measures     Row Name 07/11/22 0846 07/09/22 1112 07/08/22 1000       How much help from another person do you currently need...    Turning from your back to your side while in flat bed without using bedrails? 3  -CHRISTIANO 3  -CHRISTIANO 3  -JUAN    Moving from lying on back to sitting on the side of a flat bed without bedrails? 3  -CHRISTIANO 3  -CHRISTIANO 3  -JUAN    Moving to and from a bed to a chair (including a wheelchair)? 3  -CHRISTIANO 3  -CHRISTIANO 3  -JUAN    Standing up from a chair using your arms (e.g., wheelchair, bedside chair)? 3  -CHRISTIANO 3  -CHRISTIANO 3  -JUAN    Climbing 3-5 steps with a railing? 2  -CHRISTIANO 2  -CHRISTIANO 2  -JUAN    To walk in hospital room? 3  -CHRISTIANO 3  -CHRISTIANO 3  -JUAN    AM-PAC 6 Clicks Score (PT) 17  -CHRISTIANO 17  -CHRISTIANO 17  -JUAN       Functional Assessment    Outcome Measure Options AM-PAC 6 Clicks Basic Mobility (PT)  -CHRISTIANO AM-PAC 6 Clicks Basic Mobility (PT)  -CHRISTIANO --          User Key  (r) = Recorded By, (t) = Taken By, (c) = Cosigned By    Initials Name Provider Type    JUAN Carrie Callaway PTA Physical Therapist Assistant    Александр Morley, [place] Physical Therapist Assistant                 Time Calculation:    PT Charges      Row Name 07/11/22 0846             Time Calculation    Start Time 0846  -CHRISTIANO      Stop Time 0910  -CHRISTIANO      Time Calculation (min) 24 min  -CHRISTIANO      PT Received On 07/11/22  -CHRISTIANO              Time Calculation- PT    Total Timed Code Minutes- PT 24 minute(s)  -CHRISTIANO              Timed Charges    20426 - Gait Training Minutes  24  -CHRISTIANO              Total Minutes    Timed Charges Total Minutes 24  -CHRISTIANO       Total Minutes 24  -CHRISTIANO            User Key  (r) = Recorded By, (t) = Taken By, (c) = Cosigned By    Initials Name Provider Type    Александр Morley PTA Physical Therapist Assistant              Therapy Charges for Today     Code Description Service Date Service Provider Modifiers Qty    [id number] HC GAIT TRAINING EA 15 MIN 7/11/2022 Александр Saravia PTA GP 2          PT G-Codes  Outcome Measure Options: AM-PAC 6 Clicks Basic Mobility (PT)  AM-PAC 6 Clicks Score (PT): 17  AM-PAC 6 Clicks Score (OT): 17    Александр Saravia PTA  7/11/2022

## 2022-07-11 NOTE — CASE MANAGEMENT/SOCIAL WORK
Continued Stay Note   [facility]     Patient Name: Elijah Escobar  MRN: [id number]  Today's Date: 7/11/2022    Admit Date: 6/30/2022     Discharge Plan     Row Name 07/11/22 1058       Plan    Plan Comments Pt insurance did approve [facility] for today. Ayanna with [facility] is calling pt dtr to update. Possible dc today to [facility].      Pt is being dc'd to [facility] today.     Final Discharge Disposition Code 63 - [facility]               Discharge Codes    No documentation.               Expected Discharge Date and Time     Expected Discharge Date Expected Discharge Time    Jul 7, 2022             JOO Quinteros

## 2022-07-11 NOTE — PROGRESS NOTES
LOS: 11 days   Patient Care Team:  Dylan Lama APRN as PCP - General (Nurse Practitioner)  Rahul Arnold APRN as Nurse Practitioner (Nurse Practitioner)  Flaco Portillo MD as Surgeon (Neurosurgery)    Chief Complaint: Rapid atrial fibrillation     Subjective    Elijah Balbina Escobar is a 66 y.o. male who is being seen in follow-up.  Overnight feels well  No chest pain  No palpitation  No presyncope  No syncope  No orthopnea  No paroxysmal nocturnal dyspnea  Hemodynamically stable  Labs reviewed  No new issues or events  Tolerating current medications well  Satisfactory bowel and bladder activity  Satisfactory oral intake  Resting well   Nurse taking care of patient available for discussion  QTC interval is less prolonged, QTC is decreased from 515->4 87 as below  Sotalol was discontinued yesterday        Telemetry: Atrial fibrillation with rates between 100- 120 bpm    Review of Systems   Constitutional: No chills   Has fatigue   No fever.   HENT: Negative.    Eyes: Negative.    Respiratory: Negative for cough,   No chest wall soreness,   Shortness of breath,   no wheezing, no stridor.    Cardiovascular: As above  Gastrointestinal: Negative for abdominal distention,  No abdominal pain,   No blood in stool,   No constipation,   No diarrhea,   No nausea   No vomiting.   Endocrine: Negative.    Genitourinary: Negative for difficulty urinating, dysuria, flank pain and hematuria.   Musculoskeletal: Negative.    Skin: Negative for rash and wound.   Allergic/Immunologic: Negative.    Neurological: Negative for dizziness, syncope, weakness,   No light-headedness  No  headaches.   Hematological: Does not bruise/bleed easily.   Psychiatric/Behavioral: Negative for agitation or behavioral problems,   No confusion,   the patient is  nervous/anxious.       History:   Past Medical History:   Diagnosis Date   • Brain tumor (HCC)    • Coronary artery disease    • COVID-19 vaccine series completed     MODERNA X 3; LAST  DOSE 3/2022   • Diabetes (HCC)    • Erectile dysfunction    • GERD (gastroesophageal reflux disease)    • Hypercholesteremia    • Hypertension      Past Surgical History:   Procedure Laterality Date   • BALLOON ANGIOPLASTY, ARTERY Right    • COLONOSCOPY     • CRANIECTOMY Right 7/1/2022    Procedure: CRANIECTOMY WITH INCISIONAL WASHOUT;  Surgeon: Felipe Banerjee MD;  Location: [facility] OR;  Service: Neurosurgery;  Laterality: Right;   • CRANIOTOMY FOR TUMOR Right 6/16/2022    Procedure: CRANIOTOMY FOR TUMOR STERIOTACTIC WITH BRAIN LAB, right;  Surgeon: Flaco Portillo MD;  Location:  [facility] OR;  Service: Neurosurgery;  Laterality: Right;     Social History     Socioeconomic History   • Marital status:    Tobacco Use   • Smoking status: Current Every Day Smoker     Packs/day: 0.50   • Smokeless tobacco: Never Used   Vaping Use   • Vaping Use: Never used   Substance and Sexual Activity   • Alcohol use: Never   • Drug use: Never   • Sexual activity: Defer     Family History   Problem Relation Age of Onset   • Cancer Mother         liver   • Heart disease Father        Labs:  WBC WBC   Date Value Ref Range Status   07/10/2022 6.96 3.40 - 10.80 10*3/mm3 Final      HGB Hemoglobin   Date Value Ref Range Status   07/10/2022 9.6 (L) 13.0 - 17.7 g/dL Final      HCT Hematocrit   Date Value Ref Range Status   07/10/2022 30.3 (L) 37.5 - 51.0 % Final      Platelets Platelets   Date Value Ref Range Status   07/10/2022 259 140 - 450 10*3/mm3 Final      MCV MCV   Date Value Ref Range Status   07/10/2022 95.3 79.0 - 97.0 fL Final        Results from last 7 days   Lab Units 07/10/22  0219 07/06/22  0740 07/05/22  0754   SODIUM mmol/L 138 138 136   POTASSIUM mmol/L 3.5 3.8 4.8   CHLORIDE mmol/L 105 103 101   CO2 mmol/L 22.0 26.0 22.0   BUN mg/dL 6* 9 11   CREATININE mg/dL 0.67* 0.77 1.39*   CALCIUM mg/dL 8.2* 8.7 8.6   BILIRUBIN mg/dL 0.3 0.5 0.5   ALK PHOS U/L 100 100 103   ALT (SGPT) U/L 9 16 18   AST (SGOT) U/L 9 11 12    GLUCOSE mg/dL 147* 102* 226*     Lab Results   Component Value Date    TROPONINT <0.010 06/24/2022     PT/INR:  No results found for: PROTIME/No results found for: INR    Imaging Results (Last 72 Hours)     ** No results found for the last 72 hours. **          Objective     No Known Allergies    Medication Review: Performed  Current Facility-Administered Medications   Medication Dose Route Frequency Provider Last Rate Last Admin   • acetaminophen (TYLENOL) tablet 650 mg  650 mg Oral Q4H PRN Rahul Arnold APRN   650 mg at 07/11/22 0828    Or   • acetaminophen (TYLENOL) 160 MG/5ML solution 650 mg  650 mg Oral Q4H PRN Rahul Arnold APRN        Or   • acetaminophen (TYLENOL) suppository 650 mg  650 mg Rectal Q4H PRN Rahul Arnold APRN       • amLODIPine (NORVASC) tablet 10 mg  10 mg Oral Q24H Angel Sarabia MD   10 mg at 07/11/22 0824   • atorvastatin (LIPITOR) tablet 20 mg  20 mg Oral Nightly Rahul Arnold APRN   20 mg at 07/10/22 2049   • sennosides-docusate (PERICOLACE) 8.6-50 MG per tablet 2 tablet  2 tablet Oral BID Rahul Arnold APRN   2 tablet at 07/10/22 2049    And   • polyethylene glycol (MIRALAX) packet 17 g  17 g Oral Daily PRN Rahul Arnold APRN        And   • bisacodyl (DULCOLAX) EC tablet 5 mg  5 mg Oral Daily PRN Rahul Arnold APRN        And   • bisacodyl (DULCOLAX) suppository 10 mg  10 mg Rectal Daily PRN Rahul Arnold APRN       • cefepime (MAXIPIME) 2 g/100 mL 0.9% NS (mbp)  2 g Intravenous Q8H Edwin Santos MD   2 g at 07/11/22 0601   • dextrose (D50W) (25 g/50 mL) IV injection 25 g  25 g Intravenous Q15 Min PRN Rahul Arnold APRN       • dextrose (GLUTOSE) oral gel 15 g  15 g Oral Q15 Min PRN Rahul Arnold APRN       • dilTIAZem (CARDIZEM) 125 mg in 125 mL 0.7% sodium chloride  infusion  5-15 mg/hr Intravenous Titrated Angel Sarabia MD 10 mL/hr at 07/11/22 0627 10 mg/hr at 07/11/22 0627   • famotidine (PEPCID) tablet 40 mg  40 mg Oral Daily Rahul Arnold, APRN    40 mg at 07/11/22 0820   • gabapentin (NEURONTIN) capsule 300 mg  300 mg Oral TID Rahul Arnold APRN   300 mg at 07/11/22 0823   • glucagon (human recombinant) (GLUCAGEN DIAGNOSTIC) injection 1 mg  1 mg Intramuscular Q15 Min PRN Rahul Arnold APRN       • insulin detemir (LEVEMIR) injection 30 Units  30 Units Subcutaneous Nightly Angel Sarabia MD   30 Units at 07/10/22 2043   • Insulin Lispro (humaLOG) injection 0-14 Units  0-14 Units Subcutaneous 4x Daily AC & at Bedtime Rahul Arnold APRN   3 Units at 07/11/22 0820   • isosorbide mononitrate (IMDUR) 24 hr tablet 60 mg  60 mg Oral Q24H Angel Sarabia MD   60 mg at 07/11/22 0821   • labetalol (NORMODYNE,TRANDATE) injection 10 mg  10 mg Intravenous Q6H PRN Rahul Arnold APRN   10 mg at 07/11/22 0207   • levETIRAcetam (KEPPRA) tablet 500 mg  500 mg Oral BID Rahul Arnold APRN   500 mg at 07/11/22 0822   • lisinopril (PRINIVIL,ZESTRIL) tablet 40 mg  40 mg Oral Daily Rahul Arnold APRN   40 mg at 07/11/22 0820   • metoprolol tartrate (LOPRESSOR) tablet 50 mg  50 mg Oral Q12H Shubham Kc MD   50 mg at 07/11/22 0824   • niCARdipine (CARDENE) 25 mg in 250 mL NS infusion  5-15 mg/hr Intravenous Titrated Angel Sarabia MD   Stopped at 07/09/22 0805   • nicotine (NICODERM CQ) 14 MG/24HR patch 1 patch  1 patch Transdermal Daily PRN Angel Sarabia MD   1 patch at 07/11/22 0044   • ondansetron (ZOFRAN) tablet 4 mg  4 mg Oral Q6H PRN Rahul Arnold APRN        Or   • ondansetron (ZOFRAN) injection 4 mg  4 mg Intravenous Q6H PRN Rahul Arnold APRN       • sodium chloride 0.9 % flush 10 mL  10 mL Intravenous Q12H Rahul Arnold APRN   10 mL at 07/10/22 0904   • sodium chloride 0.9 % flush 10 mL  10 mL Intravenous PRN Rahul Arnold APRN           Vital Sign Min/Max for last 24 hours  Temp  Min: 98.1 °F (36.7 °C)  Max: 98.8 °F (37.1 °C)   BP  Min: 102/82  Max: 158/71   Pulse  Min: 74  Max: 152   Resp  Min: 15  Max: 23   SpO2  Min: 93  "%  Max: 100 %   No data recorded   No data recorded     Flowsheet Rows    Flowsheet Row First Filed Value   Admission Height 177.8 cm (70\") Documented at 06/30/2022 1904   Admission Weight 125 kg (276 lb) Documented at 06/30/2022 1904          Results for orders placed during the hospital encounter of 06/16/22    Adult Transthoracic Echo Complete W/ Cont if Necessary Per Protocol    Interpretation Summary  · Left ventricular ejection fraction appears to be 56 - 60%. Left ventricular systolic function is normal.  · Left ventricular wall thickness is consistent with mild concentric hypertrophy.  · Normal right ventricular cavity size and systolic function noted.  · There is no significant (greater than mild) valvular dysfunction.      Physical Exam:    General Appearance: Awake, alert, in no acute distress  Eyes: Pupils equal and reactive    Ears: Appear intact with no abnormalities noted  Nose: Nares normal, no drainage  Neck: supple, trachea midline, no carotid bruit and no JVD  Back: no kyphosis present,    Lungs: respirations regular, respirations even and respirations unlabored  Heart: normal S1, S2, 2/6 systolic murmur left sternal border  Irregular  Abdomen: normal bowel sounds, no tenderness   Skin: no bleeding, bruising or rash  Extremities: no cyanosis  Psychiatric/Behavioral: Negative for agitation, behavioral problems, confusion, the patient does  appear to be nervous/anxious.       Results Review:   I reviewed the patient's new clinical results.  I reviewed the patient's new imaging results and agree with the interpretation.  I reviewed the patient's other test results and agree with the interpretation  I personally viewed and interpreted the patient's EKG/Telemetry data    Discussed with patient  Updated patient regarding any new or relevant abnormalities on review of records or any new findings on physical exam.   Mentioned to patient about purpose of visit and desirable health short and long term goals " and objectives.     Reviewed available prior notes, consults, prior visits, laboratory findings, radiology and cardiology relevant reports.   Updated chart as applicable.   I have reviewed the patient's medical history in detail and updated the computerized patient record as relevant.          Assessment & Plan       Hypertension    GERD (gastroesophageal reflux disease)    Coronary artery disease    Type 2 diabetes mellitus with hyperglycemia and peripheral neuropathy, with long-term current use of insulin (HCC)    Paroxysmal atrial fibrillation with rapid ventricular response (HCC)    Post-operative infection    S/P craniotomy  Preserved biventricular function  Mild left ventricular hypertrophy    Plan    Discussed with patient as well as nurse taking care of the patient  Increased beta-blocker therapy  Taper and discontinue IV Cardizem  Results of echocardiogram reviewed  No additional cardiac testing currently required  Keep on telemetry  Low-sodium diet  Anticoagulation when feasible  Aspirin 81 mg p.o. daily if possible       Shubham Kc MD  07/11/22  09:25 CDT    EMR Dragon/Transcription was used to dictate part of this note

## 2022-07-11 NOTE — CONSULTS
#20 ga IV to left forearm using ultrasound guidance.  In right arm, pt's cephalic vein appeared to be dilated and was noncompressible throughout his entire arm.  Spoke with Hank-would recommend that patient have venous ultrasound.

## 2022-07-11 NOTE — DISCHARGE SUMMARY
DISCHARGE SUMMARY FROM HOSPITAL    Date of Discharge:  7/11/2022    Presenting Diagnosis/History of Present Illness  Post-operative infection [T81.40XA]    Medical History   Patient Active Problem List   Diagnosis   • Meningioma s/p craniotomy   • Hypertension   • GERD (gastroesophageal reflux disease)   • Coronary artery disease   • Morbid obesity (HCC)   • Type 2 diabetes mellitus with hyperglycemia and peripheral neuropathy, with long-term current use of insulin (HCC)   • Leukocytosis   • Other specified anemias   • Paroxysmal atrial fibrillation with rapid ventricular response (HCC)   • Atrial fibrillation with RVR (HCC)   • Post-operative infection   • S/P craniotomy     Discharge Diagnosis/ Active Hospital Problems:   Active Hospital Problems    Diagnosis    • Post-operative infection    • S/P craniotomy      Added automatically from request for surgery [id number]     • Paroxysmal atrial fibrillation with rapid ventricular response (HCC)    • Coronary artery disease    • Type 2 diabetes mellitus with hyperglycemia and peripheral neuropathy, with long-term current use of insulin (HCC)    • Hypertension    • GERD (gastroesophageal reflux disease)      Hospital Course  Patient is a 66 y.o. male presented with a significant medical history of recent craniotomy for tumor, 6/16/2022, coronary artery disease, A. fib, diabetes, erectile dysfunction, GERD, hypertension, hyperlipidemia. He presents with a new problem of a persistent right sided headache, particularly over the right temporal region and dizziness with standing.  Physical exam findings of tense but fluctuant area of edema from the mid frontal to right parietal region of the scalp, the area is tender to palpation, periorbital lesions with purulent drainage from the left eye, scalp incision is clean, dry, and intact, bright and awake, oriented x3, speech is clear, follows commands without prompting showing thumbs up and 2 fingers bilaterally, no pronator  drift, left dysmetria, gross hyporeflexia, and bilateral Babinski.  CT of the head from 6/23/2022, showing expected postsurgical changes to the right frontal lobe, small amount of fluid collection and pneumocephalus in the right subdural space, small amount of vasogenic edema with 4.3 mm left-to-right midline shift.  Repeat CT of the head obtained 6/30/2022 shows no acute intracranial blood products, unresolved vasogenic edema, midline shift resolved, increased left frontal and temporal soft tissue swelling.  MRI of the brain from 6/30/2022 shows a 7.9 x 6.5 x 0.9 cm rim-enhancing fluid collection overlying the bone flap concerning for pseudomeningocele versus infection.    On 6/16/2022 the patient was brought to the main operating room where he underwent an uneventful craniotomy for tumor removal.  Postoperatively he  did extremely well and his neurological exam remained unchanged.  He was kept in the hospital for close neurologic monitoring, airway observation, and for pain control.  His JUAN drain has been removed.  He has been able to ambulate, tolerate oral diet, oral pain medications and void.     Prior to discharge Mr. Escobar experience an episode of A. fib with RVR.  He was evaluated by both hospital services and cardiology.  He was initially placed on a Cardizem drip and carvedilol was transitioned back to sotalol.  An echocardiogram was obtained and he was found to have a left ventricular ejection fraction of 55-60% with normal left ventricular systolic function.  His pulse rate normalized and he was deemed safe for discharge home by cardiology.       Mr. Escobar return to [facility] on 6/24/2022 with A. fib with RVR and was subsequently found to have a urinary tract infection.  He has since been discharged with reports of well-controlled heart rate and blood pressure.     He returned to [facility] ED on 6/30/2022 with complaints of persistent right-sided headache and dizziness with  standing. He additionally reports 1 fall from standing height while packing groceries.  He denies hitting his head or loss of consciousness.  States he completed full course of steroid taper and is taking Keppra as prescribed.  He additionally denies fevers, chills, seizure-like activity, visual disturbances, difficulty with words or word finding, facial asymmetry or dysesthesias, unilateral weakness, nausea or vomiting.     On 7/1/2022 the patient was brought to the main operating room where he underwent an uneventful incision/drainage and washout and craniectomy.  Postoperatively he has done well and his neurological exam improved.  He has been kept in the hospital for close neurologic monitoring, airway observation, and for pain control.  He has been able to ambulate, tolerate oral diet, oral pain medications and void. He has been evaluated by infectious disease with recommendations as follows: Continue cefepime with tentative stop date 7/22/2022.  A single-lumen PICC line was placed to the right upper extremity.  He has also been evaluated by cardiology for continuing management of A. fib, as well as physical therapy and Occupational Therapy and deemed safe for discharge to [facility].  He has been fitted for cranial helmet which should remain at bedside and use at all times while out of bed.  Additional information provided in hospital discharge instructions.    Procedures Performed  Procedure(s):  CRANIECTOMY WITH INCISIONAL WASHOUT     Consults:   Consults     Date and Time Order Name Status Description    7/10/2022 10:40 AM Inpatient Cardiology Consult Completed     7/6/2022  9:05 AM Inpatient Hospitalist Consult      7/1/2022  7:13 AM Inpatient Infectious Diseases Consult Completed     6/30/2022  9:28 PM Inpatient Hospitalist Consult Completed     6/24/2022  4:53 AM Inpatient Neurosurgery Consult Completed     6/17/2022  8:39 AM Inpatient Hospitalist Consult Completed         Pertinent  Test Results:   CT Head Without Contrast    Result Date: 7/5/2022  1. Removal of the right frontal the craniotomy flap. The edema of the right frontal lobe at the site of previous craniotomy and bulging of the frontal lobe through the craniotomy. 2. Right frontal subdural fluid accumulation extending medially into the right parafalcine region which was not noted on the previous study. 3. Subgaleal air and fluid accumulation in the right frontal region as detailed above.            This report was finalized on 07/05/2022 08:35 by Dr. Michelle Vigil MD.    CT Head Without Contrast    Result Date: 6/30/2022  Impression:  1. Expected postoperative changes following recent right frontal convexity meningioma resection. 2. No acute intracranial hemorrhage, ischemia or worsening mass effect/edema. This report was finalized on 06/30/2022 13:15 by Dr Rudy Orta, .    CT Head Without Contrast    Result Date: 6/24/2022  1. The postsurgical changes/tumor resection in the right frontal lobe. Right frontal craniotomy with subgaleal and subdural fluid accumulation and small amount of air. 2. Right frontal vasogenic edema with mass effect and mild shift to the left. 3. Ethmoid sinusitis.  The above study was initially reviewed and reported by [employer]. I do not find any discrepancies.          This report was finalized on 06/24/2022 06:31 by Dr. Michelle Vigil MD.    CT Head Without Contrast    Result Date: 6/16/2022  1. Status post craniotomy on the right with resection of a tumor from the frontal dural space. A subdural drain is located in this area. There is air in the subdural space. There is minimal hemorrhage in the subdural space. 2. Vasogenic edema in the right frontal white matter remains stable. There is continued mild mass effect with sulcal effacement and mild compression of the right lateral ventricle.  This report was finalized on 06/16/2022 17:43 by Dr. Arian Stauffer MD.    MRI Brain With & Without  Contrast    Result Date: 6/30/2022  Impression:  1. Recent right frontal convexity meningioma resection. There is an approximately 7.9 x 6.5 x 0.9 cm rim-enhancing fluid collection which is seen overlying the bone flap, suspicious for a pseudomeningocele. This is less than 1 cm deep to the skin incision and given the amount of rim enhancement which is present, it is difficult to exclude infection. I will say that this collection does not image as an abscess on diffusion, with no diffusion restriction noted. Similarly, there is no subdural empyema present. 2. Pachymeningeal enhancement is also present along the right cerebral convexity although this is not unexpected in the postoperative setting.  Findings and recommendations were discussed with CYNTHIA MORSE on 6/30/2022 at 2:57 PM CDT. This report was finalized on 06/30/2022 14:58 by Dr Rudy Orta, .    XR Chest 1 View    Result Date: 6/30/2022   No acute findings. This report was finalized on 06/30/2022 21:57 by Dr. Mk Chinchilla MD.    XR Chest 1 View    Result Date: 6/24/2022  1. No radiographic evidence of acute cardiopulmonary process.   This report was finalized on 06/24/2022 07:04 by Dr. Dontae Grijalva MD.    CT Angiogram Chest    Result Date: 6/24/2022  1. There is no evidence of embolic disease. 2. Coronary calcifications are present. 3. Prominent pulmonary artery. This may be associated with pulmonary arterial hypertension.   This report was finalized on 06/24/2022 07:53 by Dr. Dontae Grijalva MD.    CBC:   Results from last 7 days   Lab Units 07/10/22  0219 07/06/22  0740 07/05/22  0754   WBC 10*3/mm3 6.96 8.48 11.26*   HEMOGLOBIN g/dL 9.6* 10.3* 10.8*   HEMATOCRIT % 30.3* 32.5* 31.7*   PLATELETS 10*3/mm3 259 261 278     BMP:  Results from last 7 days   Lab Units 07/10/22  0219 07/06/22  0740 07/05/22  0754   SODIUM mmol/L 138 138 136   POTASSIUM mmol/L 3.5 3.8 4.8   CHLORIDE mmol/L 105 103 101   CO2 mmol/L 22.0 26.0 22.0   BUN mg/dL 6* 9 11    CREATININE mg/dL 0.67* 0.77 1.39*   GLUCOSE mg/dL 147* 102* 226*   CALCIUM mg/dL 8.2* 8.7 8.6   ALT (SGPT) U/L 9 16 18     Culture Results:   Urine Culture   Date Value Ref Range Status   07/09/2022 No growth  Final   07/05/2022 No growth  Final     Condition on Discharge:  Stable    Vital Signs  Temp:  [98.1 °F (36.7 °C)-98.8 °F (37.1 °C)] 98.8 °F (37.1 °C)  Heart Rate:  [] 108  Resp:  [15-22] 17  BP: (102-158)/() 145/78    Physical Exam:   Physical Exam  Vitals and nursing note reviewed.   Constitutional:       General: He is not in acute distress.     Appearance: Normal appearance. He is well-developed and well-groomed. He is obese. He is not ill-appearing, toxic-appearing or diaphoretic.      Comments: BMI 37.39   HENT:      Head: Normocephalic and atraumatic.        Right Ear: Hearing normal.      Left Ear: Hearing normal.   Eyes:      Extraocular Movements: EOM normal.      Conjunctiva/sclera: Conjunctivae normal.      Pupils: Pupils are equal, round, and reactive to light.   Neck:      Trachea: Trachea normal.   Cardiovascular:      Rate and Rhythm: Normal rate and regular rhythm.   Pulmonary:      Effort: Pulmonary effort is normal. No tachypnea, bradypnea, accessory muscle usage or respiratory distress.   Abdominal:      Palpations: Abdomen is soft.   Musculoskeletal:      Cervical back: Full passive range of motion without pain and neck supple.   Skin:     General: Skin is warm and dry.   Neurological:      Mental Status: He is alert and oriented to person, place, and time.      GCS: GCS eye subscore is 4. GCS verbal subscore is 5. GCS motor subscore is 6.   Psychiatric:         Speech: Speech normal.         Behavior: Behavior normal. Behavior is cooperative.          Neurologic Exam     Mental Status   Oriented to person, place, and time.   Attention: normal. Concentration: normal.   Speech: speech is normal   Level of consciousness: alert    Bright and awake.  Oriented x 3.  Follows  commands without prompting showing thumbs up and 2 fingers bilaterally.     Cranial Nerves     CN II   Visual fields full to confrontation.     CN III, IV, VI   Pupils are equal, round, and reactive to light.  Extraocular motions are normal.     CN V   Facial sensation intact.     CN VII   Facial expression full, symmetric.     CN VIII   CN VIII normal.     CN IX, X   CN IX normal.     CN XI   CN XI normal.     Motor Exam   Right arm tone: normal  Left arm tone: normal  Right leg tone: normal  Left leg tone: normal    Strength   Right deltoid: 5/5  Left deltoid: 5/5  Right biceps: 5/5  Left biceps: 5/5  Right triceps: 5/5  Left triceps: 5/5  Right wrist extension: 5/5  Left wrist extension: 5/5  Right iliopsoas: 5/5  Left iliopsoas: 5/5  Right quadriceps: 5/5  Left quadriceps: 5/5  Right anterior tibial: 5/5  Left anterior tibial: 5/5  Right gastroc: 5/5  Left gastroc: 5/5  Right EHL 5/5  Left EHL 5/5       Sensory Exam   Right arm light touch: normal  Left arm light touch: normal  Right leg light touch: normal  Left leg light touch: normal    Gait, Coordination, and Reflexes     Tremor   Resting tremor: absent  Intention tremor: absent  Action tremor: absent    Discharge Disposition  Long Term Care (DC - External)    Discharge Medications     Discharge Medications      New Medications      Instructions Start Date   acetaminophen 325 MG tablet  Commonly known as: TYLENOL   650 mg, Oral, Every 4 Hours PRN      amLODIPine 10 MG tablet  Commonly known as: NORVASC   10 mg, Oral, Every 24 Hours Scheduled   Start Date: July 12, 2022     aspirin 81 MG chewable tablet   81 mg, Oral, Daily   Start Date: July 12, 2022     bisacodyl 5 MG EC tablet  Commonly known as: DULCOLAX   5 mg, Oral, Daily PRN      bisacodyl 10 MG suppository  Commonly known as: DULCOLAX   10 mg, Rectal, Daily PRN      cefepime 2 G/ ML solution  Commonly known as: MAXIPIME   2 g, Intravenous, Every 8 Hours      dextrose 40 % gel  Commonly known as:  GLUTOSE   15 g, Oral, Every 15 Minutes PRN      dextrose 50 % solution  Commonly known as: D50W   25 g, Intravenous, Every 15 Minutes PRN      dilTIAZem HCl-Sodium Chloride 125-0.7 MG/125ML-% solution  Commonly known as: CARDIZEM   5-15 mg/hr (5-15 mg/hr), Intravenous, Titrated      famotidine 40 MG tablet  Commonly known as: PEPCID   40 mg, Oral, Daily   Start Date: July 12, 2022     glucagon (human recombinant) 1 MG injection  Commonly known as: GLUCAGEN DIAGNOSTIC   1 mg, Intramuscular, Every 15 Minutes PRN      insulin detemir 100 UNIT/ML injection  Commonly known as: LEVEMIR   30 Units, Subcutaneous, Nightly      Insulin Lispro 100 UNIT/ML injection  Commonly known as: humaLOG   0-14 Units, Subcutaneous, 4 Times Daily Before Meals & Nightly      isosorbide mononitrate 60 MG 24 hr tablet  Commonly known as: IMDUR   60 mg, Oral, Every 24 Hours Scheduled   Start Date: July 12, 2022     labetalol 5 MG/ML injection  Commonly known as: NORMODYNE,TRANDATE   10 mg, Intravenous, Every 6 Hours PRN      metoprolol tartrate 50 MG tablet  Commonly known as: LOPRESSOR   50 mg, Oral, Every 12 Hours Scheduled      niCARdipine  Commonly known as: CARDENE   5-15 mg/hr (5-15 mg/hr), Intravenous, Titrated      nicotine 14 MG/24HR patch  Commonly known as: NICODERM CQ   1 patch, Transdermal, Daily PRN      ondansetron 4 MG tablet  Commonly known as: ZOFRAN   4 mg, Oral, Every 6 Hours PRN      polyethylene glycol 17 g packet  Commonly known as: MIRALAX   17 g, Oral, Daily PRN         Changes to Medications      Instructions Start Date   sennosides-docusate 8.6-50 MG per tablet  Commonly known as: PERICOLACE  What changed:   · how much to take  · when to take this   2 tablets, Oral, 2 Times Daily         Continue These Medications      Instructions Start Date   atorvastatin 20 MG tablet  Commonly known as: LIPITOR   20 mg, Oral, Nightly      butalbital-acetaminophen-caffeine -40 MG per tablet  Commonly known as: FIORICET,  ESGIC   1 tablet, Oral, Every 6 Hours PRN      gabapentin 300 MG capsule  Commonly known as: NEURONTIN   300 mg, Oral, 3 Times Daily, As needed      HYDROcodone-acetaminophen 7.5-325 MG per tablet  Commonly known as: NORCO   1 tablet, Oral, Every 6 Hours PRN         Stop These Medications    dilTIAZem  MG 24 hr capsule  Commonly known as: CARDIZEM CD     ibuprofen 200 MG tablet  Commonly known as: ADVIL,MOTRIN     insulin aspart 100 UNIT/ML solution pen-injector sc pen  Commonly known as: novoLOG FLEXPEN     Lantus SoloStar 100 UNIT/ML injection pen  Generic drug: Insulin Glargine     levETIRAcetam 500 MG tablet  Commonly known as: Keppra     lisinopril 40 MG tablet  Commonly known as: PRINIVIL,ZESTRIL     omeprazole 20 MG capsule  Commonly known as: priLOSEC     sotalol 120 MG tablet  Commonly known as: BETAPACE          Discharge Diet:   Diet Instructions     Advance Diet As Tolerated           Activity at Discharge:   Activity Instructions     Activity as Tolerated      Driving Restrictions      Type of Restriction: Driving    Driving Restrictions: No Driving While Taking Narcotics    Gradually Increase Activity Until at Pre-Hospitalization Level      Lifting Restrictions      Type of Restriction: Lifting    Lifting Restrictions: Lifting Restriction (Indicate Limit)    Weight Limit (Pounds): 8    Length of Lifting Restriction: 2-3 WEEKS         Follow-up Appointments  Future Appointments   Date Time Provider Department Center   7/20/2022 10:45 AM PAD [facility] MRI 2 BH PAD MR BI PAD   7/20/2022 12:00 PM Flaco Portillo MD [facility] NS PAD PAD     Additional Instructions for the Follow-ups that You Need to Schedule     Call MD With Problems / Concerns   As directed      Instructions: CALL WITH ANY NEW OR ADDITIONAL CONCERNS.    Order Comments: Instructions: CALL WITH ANY NEW OR ADDITIONAL CONCERNS.          Discharge Follow-up with Specified Provider: Dr. Portillo; 6 Weeks   As directed      To: Dr. Portillo     Follow Up: 6 Weeks         Discharge Follow-up with Specified Provider: ADITYA Gallego; 2 Weeks   As directed      To: ADITYA Gallego    Follow Up: 2 Weeks             Test Results Pending at Discharge  None     ADITYA Cespedes  07/11/22  12:53 CDT    [id number]

## 2022-07-11 NOTE — THERAPY DISCHARGE NOTE
Acute Care - Occupational Therapy Discharge Summary  [facility]     Patient Name: Elijah Escobar  : 1955  MRN: [id number]    Today's Date: 2022                 Admit Date: 2022        OT Recommendation and Plan    Visit Dx:    ICD-10-CM ICD-9-CM   1. Postoperative infection, unspecified type, initial encounter  T81.40XA 998.59   2. S/P craniotomy  Z98.890 V45.89   3. Impaired mobility and ADLs  Z74.09 V49.89    Z78.9    4. Impaired mobility  Z74.09 799.89          Time Calculation- OT     Row Name 22 1135 22 0846          Time Calculation- OT    OT Start Time 1047 (P)   -AD --     OT Stop Time 1134 (P)   -AD --     OT Time Calculation (min) 47 min (P)   -AD --     Total Timed Code Minutes- OT 47 minute(s) (P)   -AD --     OT Received On 22 (P)   -AD --            Timed Charges    31055 - Gait Training Minutes  -- 24  -CHRISTIANO     48888 - OT Therapeutic Activity Minutes 36 (P)   -AD --     16216 - OT Self Care/Mgmt Minutes 11 (P)   -AD --            Total Minutes    Timed Charges Total Minutes 47 (P)   -AD 24  -CHRISTIANO      Total Minutes 47 (P)   -AD 24  -CHRISITANO           User Key  (r) = Recorded By, (t) = Taken By, (c) = Cosigned By    Initials Name Provider Type    Александр Morley, PTA Physical Therapist Assistant    Sara Davis, OT Student OT Student                   OT Rehab Goals     Row Name 22 1520             Transfer Goal 1 (OT)    Activity/Assistive Device (Transfer Goal 1, OT) sit-to-stand/stand-to-sit;bed-to-chair/chair-to-bed;toilet;shower chair;commode, bedside without drop arms  -MT      [place] Level/Cues Needed (Transfer Goal 1, OT) contact guard required  -MT      Time Frame (Transfer Goal 1, OT) long term goal (LTG);10 days  -MT      Progress/Outcome (Transfer Goal 1, OT) goal not met  -MT              Dressing Goal 1 (OT)    Activity/Device (Dressing Goal 1, OT) dressing skills, all  -MT      [place]/Cues Needed (Dressing Goal 1, OT) minimum  assist (75% or more patient effort)  -MT      Time Frame (Dressing Goal 1, OT) long term goal (LTG);10 days  -MT      Progress/Outcome (Dressing Goal 1, OT) goal not met  -MT              Toileting Goal 1 (OT)    Activity/Device (Toileting Goal 1, OT) toileting skills, all  -MT      [place] Level/Cues Needed (Toileting Goal 1, OT) minimum assist (75% or more patient effort)  -MT      Time Frame (Toileting Goal 1, OT) long term goal (LTG);10 days  -MT      Progress/Outcome (Toileting Goal 1, OT) goal not met  -MT              Self-Feeding Goal 1 (OT)    Activity/Device (Self-Feeding Goal 1, OT) self-feeding skills, all  -MT      [place] Level/Cues Needed (Self-Feeding Goal 1, OT) minimum assist (75% or more patient effort)  -MT      Time Frame (Self-Feeding Goal 1, OT) long term goal (LTG);10 days  -MT      Progress/Outcomes (Self-Feeding Goal 1, OT) goal not met  -MT            User Key  (r) = Recorded By, (t) = Taken By, (c) = Cosigned By    Initials Name Provider Type Discipline    MT Coretta Arellano COTA Occupational Therapist Assistant OT                 Outcome Measures     Row Name 07/11/22 0846 07/09/22 1112          How much help from another person do you currently need...    Turning from your back to your side while in flat bed without using bedrails? 3  -CHRISTIANO 3  -CHRISTIANO     Moving from lying on back to sitting on the side of a flat bed without bedrails? 3  -CHRISTIANO 3  -CHRISTIANO     Moving to and from a bed to a chair (including a wheelchair)? 3  -CHRISTIANO 3  -CHRISTIANO     Standing up from a chair using your arms (e.g., wheelchair, bedside chair)? 3  -CHRISTIANO 3  -CHRISTIANO     Climbing 3-5 steps with a railing? 2  -CHRISTIANO 2  -CHRISTIANO     To walk in hospital room? 3  -CHRISTIANO 3  -CHRISTIANO     AM-PAC 6 Clicks Score (PT) 17  -CHRISTIANO 17  -CHRISTIANO            Functional Assessment    Outcome Measure Options AM-PAC 6 Clicks Basic Mobility (PT)  -CHRISTIANO AM-PAC 6 Clicks Basic Mobility (PT)  -CHRISTIANO           User Key  (r) = Recorded By, (t) = Taken By, (c) = Cosigned By     Initials Name Provider Type    Александр Morley, PTA Physical Therapist Assistant                Timed Therapy Charges  Total Units: 3    Charges  Total Units: 3    Procedure Name Documented Minutes Units Code    HC OT THERAPEUTIC ACT EA 15 MIN 36  2    89923 (CPT®)      HC OT SELF CARE/MGMT/TRAIN EA 15 MIN 11  1    86601 (CPT®)               Documented Minutes  Total Minutes: 47    Therapy Provided Minutes    16424 - OT Therapeutic Activity Minutes 36    17234 - OT Self Care/Mgmt Minutes 11                    OT Discharge Summary  Reason for Discharge: Discharge from facility  Outcomes Achieved: Refer to plan of care for updates on goals achieved  Discharge Destination: [facility]  7/11/2022

## 2022-07-11 NOTE — PROGRESS NOTES
Daily Progress Note    ASSESSMENT:   Elijah Escobar is a 66 y.o. male with a significant medical history of recent craniotomy for tumor, 6/16/2022, coronary artery disease, A. fib, diabetes, erectile dysfunction, GERD, hypertension, hyperlipidemia. He presents with a new problem of a persistent right sided headache, particularly over the right temporal region and dizziness with standing.  Physical exam findings of tense but fluctuant area of edema from the mid frontal to right parietal region of the scalp, the area is tender to palpation, periorbital lesions with purulent drainage from the left eye, scalp incision is clean, dry, and intact, bright and awake, oriented x3, speech is clear, follows commands without prompting showing thumbs up and 2 fingers bilaterally, no pronator drift, left dysmetria, gross hyporeflexia, and bilateral Babinski.  CT of the head from 6/23/2022, showing expected postsurgical changes to the right frontal lobe, small amount of fluid collection and pneumocephalus in the right subdural space, small amount of vasogenic edema with 4.3 mm left-to-right midline shift.  Repeat CT of the head obtained 6/30/2022 shows no acute intracranial blood products, unresolved vasogenic edema, midline shift resolved, increased left frontal and temporal soft tissue swelling.  MRI of the brain from 6/30/2022 shows a 7.9 x 6.5 x 0.9 cm rim-enhancing fluid collection overlying the bone flap concerning for pseudomeningocele versus infection.    Past Medical History:   Diagnosis Date   • Brain tumor (HCC)    • Coronary artery disease    • COVID-19 vaccine series completed     MODERNA X 3; LAST DOSE 3/2022   • Diabetes (HCC)    • Erectile dysfunction    • GERD (gastroesophageal reflux disease)    • Hypercholesteremia    • Hypertension      Active Hospital Problems    Diagnosis    • Post-operative infection    • S/P craniotomy      Added automatically from request for surgery [id number]     • Paroxysmal atrial  fibrillation with rapid ventricular response (HCC)    • Coronary artery disease    • Type 2 diabetes mellitus with hyperglycemia and peripheral neuropathy, with long-term current use of insulin (HCC)    • Hypertension    • GERD (gastroesophageal reflux disease)      CHIEF COMPLAINT:  Right sided headache and intermittent dizziness     PLAN:   Neuro: Neurologic at baseline.  Bright and awake.  Oriented x 3. Follows commands without prompting showing thumbs up and 2 fingers bilaterally.  No pronator drift.   10 Days Post-Op (7/1/2022) I&D and washout and craniectomy   Wound culture positive for Serratia   Continue [facility]   Keep ICU for close neurologic monitoring       CV: Continuous cardiac monitoring.     Not requiring Cardene to keep SBP <140.   A. fib with RVR.  On Cardizem.  Will start on low-dose daily aspirin per cardiology recommendations.  Appreciate cardiology.     PICC placed to left upper extremity.    Pulm: Continuous pulse oximetry.  Maintaining O2 sat.  : Voiding.  Bladder scans and I/O cath per policy.  Repeat UA: 2+ leuks.  31-50 WBCs.  Negative for nitrites and bacteria.  Urine culture: No growth.  Continue to monitor.  FEN: Tolerating carb consistent diet.    GI: No acute issues  ENDO: Insulin drip discontinued.  Accu-Cheks per policy.   Diabetic educator consulted, second request.   Insulin needs adjusted based on Glucomander averages.  Appreciate pharmacy   ID: BC [id number].  Wound culture positive for Serratia.  Continue cefepime per ID.  Weekly CRP and ESR.  CRP 5.93.  ESR 80.  Heme:  DVT prophylaxis, SCDs  Pain: Tolerable at present  Dispo: PT/OT.  Strict use of cranial helmet while out of bed    Pending LTAC placement.    Subjective  Symptom stable.  Doing well    Temp:  [98.1 °F (36.7 °C)-98.8 °F (37.1 °C)] 98.8 °F (37.1 °C)  Heart Rate:  [] 137  Resp:  [15-23] 22  BP: (102-158)/() 156/142    Objective:  General Appearance:  Well-appearing and in no acute distress.    Vital signs:  "(most recent): Blood pressure (!) 156/142, pulse (!) 137, temperature 98.8 °F (37.1 °C), resp. rate 22, height 177.8 cm (70\"), weight 118 kg (260 lb 9.3 oz), SpO2 99 %.      Neurologic Exam     Mental Status   Oriented to person, place, and time.   Attention: normal. Concentration: normal.   Speech: speech is normal   Level of consciousness: alert    Bright and awake.  Oriented x 3.  Follows commands without prompting showing thumbs up and 2 fingers bilaterally.     Cranial Nerves     CN II   Visual fields full to confrontation.     CN III, IV, VI   Pupils are equal, round, and reactive to light.  Extraocular motions are normal.     CN V   Facial sensation intact.     CN VII   Facial expression full, symmetric.     CN VIII   CN VIII normal.     CN IX, X   CN IX normal.     CN XI   CN XI normal.     Motor Exam   Right arm tone: normal  Left arm tone: normal  Right leg tone: normal  Left leg tone: normal    Strength   Right deltoid: 5/5  Left deltoid: 5/5  Right biceps: 5/5  Left biceps: 5/5  Right triceps: 5/5  Left triceps: 5/5  Right wrist extension: 5/5  Left wrist extension: 5/5  Right iliopsoas: 5/5  Left iliopsoas: 5/5  Right quadriceps: 5/5  Left quadriceps: 5/5  Right anterior tibial: 5/5  Left anterior tibial: 5/5  Right gastroc: 5/5  Left gastroc: 5/5  Right EHL 5/5  Left EHL 5/5       Sensory Exam   Right arm light touch: normal  Left arm light touch: normal  Right leg light touch: normal  Left leg light touch: normal    Gait, Coordination, and Reflexes     Tremor   Resting tremor: absent  Intention tremor: absent  Action tremor: absent    DRAINS:   [REMOVED] Closed/Suction Drain 1 Scalp Bulb (Removed)   Site Description Unable to view 06/19/22 0735   Dressing Status Clean;Dry;Intact 06/19/22 0735   Drainage Appearance Serosanguineous 06/19/22 0735   Status To bulb suction 06/19/22 0735   Output (mL) 5 mL 06/19/22 0723       [REMOVED] Urethral Catheter Non-latex;Silicone 16 Fr. (Removed)   Daily " Indications Selected surgeries ( tract, abdomen) 06/16/22 1339   Site Assessment Clean;Skin intact 06/16/22 1339   Collection Container Standard drainage bag 06/16/22 1339   Securement Method Securing device 06/16/22 1339   Output (mL) 1100 mL 06/16/22 1233     LAB RESULTS:  ABG:     CBC:   Results from last 7 days   Lab Units 07/10/22  0219 07/06/22  0740 07/05/22  0754   WBC 10*3/mm3 6.96 8.48 11.26*   HEMOGLOBIN g/dL 9.6* 10.3* 10.8*   HEMATOCRIT % 30.3* 32.5* 31.7*   PLATELETS 10*3/mm3 259 261 278     BMP:  Results from last 7 days   Lab Units 07/10/22  0219 07/06/22  0740 07/05/22  0754   SODIUM mmol/L 138 138 136   POTASSIUM mmol/L 3.5 3.8 4.8   CHLORIDE mmol/L 105 103 101   CO2 mmol/L 22.0 26.0 22.0   BUN mg/dL 6* 9 11   CREATININE mg/dL 0.67* 0.77 1.39*   GLUCOSE mg/dL 147* 102* 226*   CALCIUM mg/dL 8.2* 8.7 8.6   ALT (SGPT) U/L 9 16 18     Culture Results:   Blood Culture   Date Value Ref Range Status   06/30/2022 No growth at 4 days  Preliminary   06/30/2022 No growth at 4 days  Preliminary     Urine Culture   Date Value Ref Range Status   06/30/2022 <10,000 CFU/mL Gram Positive Cocci (A)  Final     Comment:     Call if further workup needed.       Wound Culture   Date Value Ref Range Status   07/01/2022 Scant growth (1+) Serratia marcescens (A)  Final     Imaging Results (Last 24 Hours)     ** No results found for the last 24 hours. **        Lab Results (last 24 hours)     Procedure Component Value Units Date/Time    POC Glucose Once [[id number]]  (Abnormal) Collected: 07/11/22 0743    Specimen: Blood Updated: 07/11/22 0805     Glucose 186 mg/dL      Comment: : [id number] Wing OgdavidMeter ID: [id number]       POC Glucose Once [[id number]]  (Abnormal) Collected: 07/11/22 0514    Specimen: Blood Updated: 07/11/22 0531     Glucose 152 mg/dL      Comment: : [id number] Hansen Scthuy TMeter ID: [id number]       POC Glucose Once [[id number]]  (Abnormal) Collected: 07/10/22 2040    Specimen: Blood  Updated: 07/10/22 2052     Glucose 182 mg/dL      Comment: : [id number] Tyrone Mckinley TMeter ID: [id number]       POC Glucose Once [[id number]]  (Abnormal) Collected: 07/10/22 1645    Specimen: Blood Updated: 07/10/22 1656     Glucose 142 mg/dL      Comment: : [id number] Wing MarcellaMeter ID: [id number]       POC Glucose Once [[id number]]  (Abnormal) Collected: 07/10/22 1324    Specimen: Blood Updated: 07/10/22 1335     Glucose 236 mg/dL      Comment: : [id number] Wing MarcellaMeter ID: [id number]           Rahul Arnold, APRN

## 2022-07-11 NOTE — PLAN OF CARE
Goal Outcome Evaluation:  Plan of Care Reviewed With: patient        Progress: improving  Outcome Evaluation: Pt was able to perform supine to sitting with min assist.  Donned Helmet at EOB.  Transfered sit to stand with CGA.  Amb 150' with straight cane and min assist/HHA, with one standing rest.  Pt required cues for posture and balance with amb.  Pt continues to forget the current task and requires redirection throughout the tx.  Pt also continues to need to be educated on current medical tx and plan.  Will continue working with pt to increase strength and safety with amb.

## 2022-07-11 NOTE — SIGNIFICANT NOTE
"   07/10/22 2000   Perceptual State WDL   Perceptual State WDL X;all   Perceptual State illusions   Patient has demonstrated some increased confusion this evening including \"illusions\" of items in room as irrational objects (ie: his truck, other people). Patient has no focal deficits and neuro-checks yield equal bilateral responses with upper and lower extremities muscle strength grading of 5. Pt receiving 7.5mg dose of percocet this evening. NIH= 1   "

## 2022-07-11 NOTE — PROGRESS NOTES
[facility]  INPATIENT PROGRESS NOTE    Patient Name: Elijah Escobar  Date of Admission: 6/30/2022  Today's Date: 07/11/22  Length of Stay: 11  Primary Care Physician: Dylan Lama APRN    Subjective   Chief Complaint: Follow-up medical management    HPI   Patient currently sitting in chair at bedside resting comfortably.  Watching TV.  Feels well.  Denies any complaints.  No chest pain.  No shortness of breath.  Denies palpitations or dizziness.  Currently on Cardizem drip at 5.  Heart rate is a fib around 110-120.        Review of Systems   All pertinent negatives and positives are as above. All other systems have been reviewed and are negative unless otherwise stated.     Objective    Temp:  [98.1 °F (36.7 °C)-98.8 °F (37.1 °C)] 98.8 °F (37.1 °C)  Heart Rate:  [] 112  Resp:  [15-23] 22  BP: (102-158)/() 102/82  Physical Exam  GEN: Awake, alert, interactive, in NAD  HEENT:  EOMI, Anicteric, Trachea midline  Lungs:  no wheezing/rales/rhonchi  Heart: irreg/irreg, +S1/s2, no rub  ABD: soft, nt/nd, +BS, no guarding/rebound  Extremities: atraumatic, no cyanosis  Skin: no rashes, craniotomy site seem clean/dry w/o drainage  Neuro: AAOx3, DENISE  Psych: normal mood & affect        Results Review:  I have reviewed the labs, radiology results, and diagnostic studies.    Laboratory Data:   Results from last 7 days   Lab Units 07/10/22  0219 07/06/22  0740 07/05/22  0754   WBC 10*3/mm3 6.96 8.48 11.26*   HEMOGLOBIN g/dL 9.6* 10.3* 10.8*   HEMATOCRIT % 30.3* 32.5* 31.7*   PLATELETS 10*3/mm3 259 261 278        Results from last 7 days   Lab Units 07/10/22  0219 07/06/22  0740 07/05/22  0754   SODIUM mmol/L 138 138 136   POTASSIUM mmol/L 3.5 3.8 4.8   CHLORIDE mmol/L 105 103 101   CO2 mmol/L 22.0 26.0 22.0   BUN mg/dL 6* 9 11   CREATININE mg/dL 0.67* 0.77 1.39*   CALCIUM mg/dL 8.2* 8.7 8.6   BILIRUBIN mg/dL 0.3 0.5 0.5   ALK PHOS U/L 100 100 103   ALT (SGPT)  U/L 9 16 18   AST (SGOT) U/L 9 11 12   GLUCOSE mg/dL 147* 102* 226*       Culture Data:   No results found for: BLOODCX, URINECX, WOUNDCX, MRSACX, RESPCX, STOOLCX    Radiology Data:   Imaging Results (Last 24 Hours)     ** No results found for the last 24 hours. **          I have reviewed the patient's current medications.     Assessment/Plan     Active Hospital Problems    Diagnosis    • Post-operative infection    • S/P craniotomy      Added automatically from request for surgery [id number]     • Paroxysmal atrial fibrillation with rapid ventricular response (HCC)    • Coronary artery disease    • Type 2 diabetes mellitus with hyperglycemia and peripheral neuropathy, with long-term current use of insulin (HCC)    • Hypertension    • GERD (gastroesophageal reflux disease)        Plan:  #1 subgaleal/epidural infection-status post OR on 7/1 for revision and washout of craniectomy. On cefepime, care per Neurosurgery and ID.     #2 paroxysmal A. Fib/flutter - cards following, on cardizem gtt w/ po metoprolol. Not on AC    #3 DM2 - levemir 30 qhs, sliding scale insulin, hypoglycemia protocol     #4 hypertension - cardene gtt has been off since 7/9. Currently on metoprolol, imdur, amlodipine.     #5 GERD -currently has Pepcid ordered.      Electronically signed by Nima Bowman DO, 07/11/22, 08:17 CDT.

## 2022-07-11 NOTE — PLAN OF CARE
"Goal Outcome Evaluation:  Plan of Care Reviewed With: (P) patient           Outcome Evaluation: (P) OT tx complete. Pt alert and agreeable, however some confusion regarding hospital stay and required frequent redirection to task. Pt completed bed mobility with min A to come to complete sit. Pt sat EOB with helmet donned and donned/doffed socks, requiring extended time, and vcs for PLB. Pt completed balance activities sitting EOB performing dynamic reaching to improve safety and function with ADLs. Pt required frequent redirection with this task. Pt with difficulty with initial stand attempt, unable to transfer weight forward to complete stand. Education and vcs provided on avoiding putting all weight in the heels. Pt with successful stands with CGA following education and vcs. Pt completed sit<>stand x4 d/t needed sitting rest breaks with standing balance activity. Pt completed dynamic reaching activities in standing with CGA-min A and was unsteady. Pt fatigues quickly. Increased fatigue and increasing HR, pt returned to supine and was \"ready to rest and get medicine.\" Continue OT POC.  "

## 2022-07-11 NOTE — PAYOR COMM NOTE
"AUTH: [id number]    Virgil Escobar (66 y.o. Male)             Date of Birth   1955    Social Security Number       Address   [address] [place] [address]    Home Phone   [phone]    MRN   [id number]       Gnosticism   Non-Alevism    Marital Status                               Admission Date   6/30/22    Admission Type   Urgent    Admitting Provider   Felipe Banerjee MD    Attending Provider   Flaco Portillo MD    Department, Room/Bed   [facility] CARDIAC CARE, C002/1       Discharge Date       Discharge Disposition       Discharge Destination                               Attending Provider: Flaco Portillo MD    Allergies: No Known Allergies    Isolation: None   Infection: None   Code Status: CPR   Advance Care Planning Activity    Ht: 177.8 cm (70\")   Wt: 118 kg (260 lb 9.3 oz)    Admission Cmt: None   Principal Problem: None                Active Insurance as of 6/30/2022     Primary Coverage     Payor Plan Insurance Group Employer/Plan Group    ANTHEM MEDICARE REPLACEMENT ANTHEM MEDICARE ADVANTAGE [id number]     Payor Plan Address Payor Plan Phone Number Payor Plan Fax Number Effective Dates    [address] [phone]  1/1/2022 - None Entered    [facility] [id number]       Subscriber Name Subscriber Birth Date Member ID       VIRGIL ESCOBAR 1955 [id number]                 Emergency Contacts      (Rel.) Home Phone Work Phone Mobile Phone    Cathy Guzmán (Son) -- -- [phone]    Manju Lua (Relative) -- -- [phone]    ESCOBARMAYTE (Spouse) [phone] -- --    david lua (Sister) [phone] -- --               Physician Progress Notes (last 24 hours)      Angel Sarabia MD at 07/10/22 1109              [facility]  INPATIENT PROGRESS NOTE    Patient Name: Virgil Escobar  Date of Admission: 6/30/2022  Today's Date: 07/10/22  Length of Stay: " 10  Primary Care Physician: Dylan Lama APRN    Subjective   Chief Complaint: elevated blood sugar  HPI   7/4 Resting in bed, appears tired and mildly ill. Does not offer any new complaints.  7/5 Patient transferred to critical care for closer neurological observation and blood sugar control.   7/6 No new complaints. Blood sugar 100-120 range on insulin drip and NPO. HR is 70 with some PAC and long QT ?AV block. Sotalol on hold since last night.   7/7 No new complaints. -160 sitting up Afib. Still NPO.  7/8 HR is controlled today. Cardizem was not required. Sotalol with good effect watching QT.  7/9 Off Cardene drip. Blood pressure 155/68. Added Norvasc and Imdur 7/8.  7/10 HR still uncontrolled. Cardiology consult noted.       Review of Systems   All pertinent negatives and positives are as above. All other systems have been reviewed and are negative unless otherwise stated.     Objective    Temp:  [98.3 °F (36.8 °C)-99.5 °F (37.5 °C)] 98.3 °F (36.8 °C)  Heart Rate:  [] 123  Resp:  [13-23] 23  BP: ()/() 152/75  Physical Exam  GEN: Awake, alert, interactive, in NAD  HEENT:   Head is bandaged.  EOMI, Anicteric, Trachea midline  Lungs:  no wheezing/rales/rhonchi  Heart: irreg/irreg tachycardic, +S1/s2, no rub  ABD: soft, nt/nd, +BS, no guarding/rebound  Extremities: atraumatic, no cyanosis  Skin: no rashes or lesions  Neuro: AAOx3, DNEISE  Psych: normal mood & affect    Results Review:  I have reviewed the labs, radiology results, and diagnostic studies.    Laboratory Data:   Results from last 7 days   Lab Units 07/10/22  0219 07/06/22  0740 07/05/22  0754   WBC 10*3/mm3 6.96 8.48 11.26*   HEMOGLOBIN g/dL 9.6* 10.3* 10.8*   HEMATOCRIT % 30.3* 32.5* 31.7*   PLATELETS 10*3/mm3 259 261 278        Results from last 7 days   Lab Units 07/10/22  0219 07/06/22  0740 07/05/22  0754   SODIUM mmol/L 138 138 136   POTASSIUM mmol/L 3.5 3.8 4.8   CHLORIDE mmol/L 105 103 101   CO2 mmol/L 22.0 26.0 22.0    BUN mg/dL 6* 9 11   CREATININE mg/dL 0.67* 0.77 1.39*   CALCIUM mg/dL 8.2* 8.7 8.6   BILIRUBIN mg/dL 0.3 0.5 0.5   ALK PHOS U/L 100 100 103   ALT (SGPT) U/L 9 16 18   AST (SGOT) U/L 9 11 12   GLUCOSE mg/dL 147* 102* 226*       Culture Data:   Blood Culture   Date Value Ref Range Status   06/30/2022 No growth at 3 days  Preliminary   06/30/2022 No growth at 3 days  Preliminary     Urine Culture   Date Value Ref Range Status   06/30/2022 <10,000 CFU/mL Gram Positive Cocci (A)  Final     Comment:     Call if further workup needed.       Wound Culture   Date Value Ref Range Status   07/01/2022 Scant growth (1+) Serratia marcescens (A)  Final       Radiology Data:   Imaging Results (Last 24 Hours)     ** No results found for the last 24 hours. **          I have reviewed the patient's current medications.     Assessment/Plan     Active Hospital Problems    Diagnosis    • Post-operative infection    • S/P craniotomy      Added automatically from request for surgery [id number]     • Paroxysmal atrial fibrillation with rapid ventricular response (HCC)    • Coronary artery disease    • Type 2 diabetes mellitus with hyperglycemia and peripheral neuropathy, with long-term current use of insulin (HCC)    • Hypertension    • GERD (gastroesophageal reflux disease)        Plan:  Status post craniotomy > Per neurosurgery  Suspected epidural infection > ID input noted  DM uncontrolled > Levemir to 25 units BID d/c  Humalog sliding scale d/c  Humulin R drip started 7/5 d/c 7/7  Resumed Levemir increase dose to 30 Units BID  Humalog sliding scale  Home dose Levemir 65 units HS    PAF > Off Cardizem drip to Sotalol > held due to QT 7/6  Sotalol 120 at 830 AM 7/7  Sotalol to 80 mg PO BID 7/9  If no rate control in the next hour resume Cardizem drip and stop Cardene  EKG 7/6 noted  Check Magnesium/Phosphorus prn    HTN > Sotalol 80 mg po BID  Imdur to 60 mg po daily  Norvasc 10 mg po daily  Lisinopril 40 mg po daily  Continue to monitor  closely  Cardene drip discontinued  ?resume Cardizem oral    Nutrition> Carb consistent diet restarted    Will continue to follow    Discharge Planning: Per attending.    Electronically signed by Angel Sarabia MD, 07/10/22, 11:09 CDT.    Electronically signed by Angel Sarabia MD at 07/10/22 1645     RusRahul farooq APRN at 07/10/22 1106          Daily Progress Note    ASSESSMENT:   Elijah Escobar is a 66 y.o. male with a significant medical history of recent craniotomy for tumor, 6/16/2022, coronary artery disease, A. fib, diabetes, erectile dysfunction, GERD, hypertension, hyperlipidemia. He presents with a new problem of a persistent right sided headache, particularly over the right temporal region and dizziness with standing.  Physical exam findings of tense but fluctuant area of edema from the mid frontal to right parietal region of the scalp, the area is tender to palpation, periorbital lesions with purulent drainage from the left eye, scalp incision is clean, dry, and intact, bright and awake, oriented x3, speech is clear, follows commands without prompting showing thumbs up and 2 fingers bilaterally, no pronator drift, left dysmetria, gross hyporeflexia, and bilateral Babinski.  CT of the head from 6/23/2022, showing expected postsurgical changes to the right frontal lobe, small amount of fluid collection and pneumocephalus in the right subdural space, small amount of vasogenic edema with 4.3 mm left-to-right midline shift.  Repeat CT of the head obtained 6/30/2022 shows no acute intracranial blood products, unresolved vasogenic edema, midline shift resolved, increased left frontal and temporal soft tissue swelling.  MRI of the brain from 6/30/2022 shows a 7.9 x 6.5 x 0.9 cm rim-enhancing fluid collection overlying the bone flap concerning for pseudomeningocele versus infection.    Past Medical History:   Diagnosis Date   • Brain tumor (HCC)    • Coronary artery disease    • COVID-19  vaccine series completed     MODERNA X 3; LAST DOSE 3/2022   • Diabetes ([facility])    • Erectile dysfunction    • GERD (gastroesophageal reflux disease)    • Hypercholesteremia    • Hypertension      Active Hospital Problems    Diagnosis    • Post-operative infection    • S/P craniotomy      Added automatically from request for surgery [id number]     • Paroxysmal atrial fibrillation with rapid ventricular response ([facility])    • Coronary artery disease    • Type 2 diabetes mellitus with hyperglycemia and peripheral neuropathy, with long-term current use of insulin ([facility])    • Hypertension    • GERD (gastroesophageal reflux disease)      CHIEF COMPLAINT:  Right sided headache and intermittent dizziness     PLAN:   Neuro: Neurologic at baseline.  Bright and awake.  Oriented x 3. Follows commands without prompting showing thumbs up and 2 fingers bilaterally.  No pronator drift.   9 Days Post-Op (7/1/2022) I&D and washout and craniectomy   Wound culture positive for Serratia   Continue [facility]   Keep ICU for close neurologic monitoring       CV: Continuous cardiac monitoring.     Not requiring Cardene to keep SBP <140.   A. fib with RVR.  On Cardizem.  Cardiology consulted.  Appreciate assist.   PICC placed to left upper extremity.    Pulm: Continuous pulse oximetry.  O2.  Maintaining O2 sat.  : Voiding.  Bladder scans and I/O cath per policy.  Repeat UA: 2+ leuks.  31-50 WBCs.  Negative for nitrites and bacteria.  Urine culture: No growth.  Continue to monitor.  FEN: Tolerating carb consistent diet.    GI: No acute issues  ENDO: Insulin drip discontinued.  Accu-Cheks per policy.   Diabetic educator consulted, second request.   Insulin needs adjusted based on Glucomander averages.  Appreciate pharmacy   ID: [id number].  Wound culture positive for Serratia.  Continue cefepime per ID.  Weekly CRP and ESR.  CRP 5.93.  ESR 80.  Heme:  DVT prophylaxis, SCDs  Pain: Tolerable at present  Dispo: PT/OT.  Strict use of cranial helmet while out  "of bed    Pending LTAC placement.    Subjective  Symptom stable.  Doing well    Temp:  [98.3 °F (36.8 °C)-99.5 °F (37.5 °C)] 98.3 °F (36.8 °C)  Heart Rate:  [] 123  Resp:  [13-23] 23  BP: ()/() 152/75    Objective:  General Appearance:  Well-appearing and in no acute distress.    Vital signs: (most recent): Blood pressure 152/75, pulse (!) 123, temperature 98.3 °F (36.8 °C), temperature source Oral, resp. rate 23, height 177.8 cm (70\"), weight 118 kg (260 lb 9.3 oz), SpO2 95 %.      Neurologic Exam     Mental Status   Oriented to person, place, and time.   Attention: normal. Concentration: normal.   Speech: speech is normal   Level of consciousness: alert    Bright and awake.  Oriented x 3.  Follows commands without prompting showing thumbs up and 2 fingers bilaterally.     Cranial Nerves     CN II   Visual fields full to confrontation.     CN III, IV, VI   Pupils are equal, round, and reactive to light.  Extraocular motions are normal.     CN V   Facial sensation intact.     CN VII   Facial expression full, symmetric.     CN VIII   CN VIII normal.     CN IX, X   CN IX normal.     CN XI   CN XI normal.     Motor Exam   Right arm tone: normal  Left arm tone: normal  Right leg tone: normal  Left leg tone: normal    Strength   Right deltoid: 5/5  Left deltoid: 5/5  Right biceps: 5/5  Left biceps: 5/5  Right triceps: 5/5  Left triceps: 5/5  Right wrist extension: 5/5  Left wrist extension: 5/5  Right iliopsoas: 5/5  Left iliopsoas: 5/5  Right quadriceps: 5/5  Left quadriceps: 5/5  Right anterior tibial: 5/5  Left anterior tibial: 5/5  Right gastroc: 5/5  Left gastroc: 5/5  Right EHL 5/5  Left EHL 5/5       Sensory Exam   Right arm light touch: normal  Left arm light touch: normal  Right leg light touch: normal  Left leg light touch: normal    Gait, Coordination, and Reflexes     Tremor   Resting tremor: absent  Intention tremor: absent  Action tremor: absent    DRAINS:   [REMOVED] Closed/Suction Drain " 1 Scalp Bulb (Removed)   Site Description Unable to view 06/19/22 0735   Dressing Status Clean;Dry;Intact 06/19/22 0735   Drainage Appearance Serosanguineous 06/19/22 0735   Status To bulb suction 06/19/22 0735   Output (mL) 5 mL 06/19/22 0723       [REMOVED] Urethral Catheter Non-latex;Silicone 16 Fr. (Removed)   Daily Indications Selected surgeries ( tract, abdomen) 06/16/22 1339   Site Assessment Clean;Skin intact 06/16/22 1339   Collection Container Standard drainage bag 06/16/22 1339   Securement Method Securing device 06/16/22 1339   Output (mL) 1100 mL 06/16/22 1233     LAB RESULTS:  ABG:     CBC:   Results from last 7 days   Lab Units 07/10/22  0219 07/06/22  0740 07/05/22  0754   WBC 10*3/mm3 6.96 8.48 11.26*   HEMOGLOBIN g/dL 9.6* 10.3* 10.8*   HEMATOCRIT % 30.3* 32.5* 31.7*   PLATELETS 10*3/mm3 259 261 278     BMP:  Results from last 7 days   Lab Units 07/10/22  0219 07/06/22  0740 07/05/22  0754   SODIUM mmol/L 138 138 136   POTASSIUM mmol/L 3.5 3.8 4.8   CHLORIDE mmol/L 105 103 101   CO2 mmol/L 22.0 26.0 22.0   BUN mg/dL 6* 9 11   CREATININE mg/dL 0.67* 0.77 1.39*   GLUCOSE mg/dL 147* 102* 226*   CALCIUM mg/dL 8.2* 8.7 8.6   ALT (SGPT) U/L 9 16 18     Culture Results:   Blood Culture   Date Value Ref Range Status   06/30/2022 No growth at 4 days  Preliminary   06/30/2022 No growth at 4 days  Preliminary     Urine Culture   Date Value Ref Range Status   06/30/2022 <10,000 CFU/mL Gram Positive Cocci (A)  Final     Comment:     Call if further workup needed.       Wound Culture   Date Value Ref Range Status   07/01/2022 Scant growth (1+) Serratia marcescens (A)  Final     Imaging Results (Last 24 Hours)     ** No results found for the last 24 hours. **        Lab Results (last 24 hours)     Procedure Component Value Units Date/Time    POC Glucose Once [[id number]]  (Abnormal) Collected: 07/10/22 0859    Specimen: Blood Updated: 07/10/22 0910     Glucose 171 mg/dL      Comment: : [id number] Maciej  Amaya ID: [id number]       Urine Culture - Urine, Urine, Random Void [[id number]]  (Normal) Collected: 07/09/22 1032    Specimen: Urine, Random Void Updated: 07/10/22 0654     Urine Culture No growth    Comprehensive Metabolic Panel [[id number]]  (Abnormal) Collected: 07/10/22 0219    Specimen: Blood Updated: 07/10/22 0259     Glucose 147 mg/dL      BUN 6 mg/dL      Creatinine 0.67 mg/dL      Sodium 138 mmol/L      Potassium 3.5 mmol/L      Chloride 105 mmol/L      CO2 22.0 mmol/L      Calcium 8.2 mg/dL      Total Protein 6.6 g/dL      Albumin 2.90 g/dL      ALT (SGPT) 9 U/L      AST (SGOT) 9 U/L      Alkaline Phosphatase 100 U/L      Total Bilirubin 0.3 mg/dL      Globulin 3.7 gm/dL      A/G Ratio 0.8 g/dL      BUN/Creatinine Ratio 9.0     Anion Gap 11.0 mmol/L      eGFR 103.0 mL/min/1.73      Comment: National Kidney Foundation and American Society of Nephrology (ASN) Task Force recommended calculation based on the Chronic Kidney Disease Epidemiology Collaboration (CKD-EPI) equation refit without adjustment for race.       Narrative:      GFR Normal >60  Chronic Kidney Disease <60  Kidney Failure <15      CBC & Differential [[id number]]  (Abnormal) Collected: 07/10/22 0219    Specimen: Blood Updated: 07/10/22 0237    Narrative:      The following orders were created for panel order CBC & Differential.  Procedure                               Abnormality         Status                     ---------                               -----------         ------                     CBC Auto Differential[[id number]]        Abnormal            Final result                 Please view results for these tests on the individual orders.    CBC Auto Differential [[id number]]  (Abnormal) Collected: 07/10/22 0219    Specimen: Blood Updated: 07/10/22 0237     WBC 6.96 10*3/mm3      RBC 3.18 10*6/mm3      Hemoglobin 9.6 g/dL      Hematocrit 30.3 %      MCV 95.3 fL      MCH 30.2 pg      MCHC 31.7 g/dL      RDW 12.1 %      RDW-SD  42.2 fl      MPV 9.4 fL      Platelets 259 10*3/mm3      Neutrophil % 62.0 %      Lymphocyte % 26.1 %      Monocyte % 9.3 %      Eosinophil % 1.3 %      Basophil % 0.6 %      Immature Grans % 0.7 %      Neutrophils, Absolute 4.31 10*3/mm3      Lymphocytes, Absolute 1.82 10*3/mm3      Monocytes, Absolute 0.65 10*3/mm3      Eosinophils, Absolute 0.09 10*3/mm3      Basophils, Absolute 0.04 10*3/mm3      Immature Grans, Absolute 0.05 10*3/mm3      nRBC 0.0 /100 WBC     POC Glucose Once [[id number]]  (Abnormal) Collected: 07/09/22 2056    Specimen: Blood Updated: 07/09/22 2108     Glucose 168 mg/dL      Comment: : [id number] Tyrone Mckinley TMeter ID: [id number]       POC Glucose Once [[id number]]  (Abnormal) Collected: 07/09/22 1656    Specimen: Blood Updated: 07/09/22 1708     Glucose 182 mg/dL      Comment: : [id number] Reasor NathanMeter ID: [id number]       POC Glucose Once [[id number]]  (Abnormal) Collected: 07/09/22 1116    Specimen: Blood Updated: 07/09/22 1127     Glucose 178 mg/dL      Comment: : [id number] Reasor NathanMeter ID: [id number]       Urinalysis, Microscopic Only - Urine, Random Void [[id number]]  (Abnormal) Collected: 07/09/22 1032    Specimen: Urine, Random Void Updated: 07/09/22 1115     RBC, UA None Seen /HPF      WBC, UA 31-50 /HPF      Bacteria, UA None Seen /HPF      Squamous Epithelial Cells, UA 0-2 /HPF      Methodology Manual Light Microscopy    Urinalysis With Culture If Indicated - Urine, Random Void [[id number]]  (Abnormal) Collected: 07/09/22 1032    Specimen: Urine, Random Void Updated: 07/09/22 1115     Color, UA Dark Yellow     Appearance, UA Cloudy     pH, UA 5.5     Specific Gravity, UA 1.024     Glucose,  mg/dL (1+)     Ketones, UA 80 mg/dL (3+)     Bilirubin, UA Negative     Blood, UA Trace     Protein, UA 30 mg/dL (1+)     Leuk Esterase, UA Moderate (2+)     Nitrite, UA Negative     Urobilinogen, UA 1.0 E.U./dL    Narrative:      In absence of clinical symptoms, the  "presence of pyuria, bacteria, and/or nitrites on the urinalysis result does not correlate with infection.        ADITYA Cespedes      Electronically signed by Rahul Arnold APRN at 07/10/22 1108     Lillian Pike MD at 07/10/22 0912          INFECTIOUS DISEASES PROGRESS NOTE    Patient:  Elijah Escobar  YOB: 1955  MRN: [id number]   Admit date: 2022   Admitting Physician: Flaco Portillo, *  Primary Care Physician: Dylan Lama APRN    Chief Complaint: \"Just wanting to be home\"        Interval History: Patient reports he thinks he is doing well.  He asked when he might be able to go home.    He did complain of some tenderness of his left IV site.  I spoke with SELENA Gonzalez and she is aware and was going to remove that IV.    No complaints of diarrhea or rash.        Allergies: No Known Allergies    Current Scheduled Medications:   amLODIPine, 10 mg, Oral, Q24H  atorvastatin, 20 mg, Oral, Nightly  cefepime, 2 g, Intravenous, Q8H  famotidine, 40 mg, Oral, Daily  gabapentin, 300 mg, Oral, TID  insulin detemir, 30 Units, Subcutaneous, Nightly  insulin lispro, 0-14 Units, Subcutaneous, 4x Daily AC & at Bedtime  isosorbide mononitrate, 60 mg, Oral, Q24H  levETIRAcetam, 500 mg, Oral, BID  lisinopril, 40 mg, Oral, Daily  senna-docusate sodium, 2 tablet, Oral, BID  sodium chloride, 10 mL, Intravenous, Q12H  sotalol, 80 mg, Oral, BID      Current PRN Medications:  •  acetaminophen **OR** acetaminophen **OR** acetaminophen  •  senna-docusate sodium **AND** polyethylene glycol **AND** bisacodyl **AND** bisacodyl  •  dextrose  •  dextrose  •  glucagon (human recombinant)  •  labetalol  •  ondansetron **OR** ondansetron  •  oxyCODONE-acetaminophen  •  oxyCODONE-acetaminophen  •  sodium chloride    Review of Systems   Gastrointestinal: Negative for diarrhea.   Skin: Negative for rash.       Objective     Vital Signs:  Temp (24hrs), Av.8 °F (37.1 °C), Min:98.3 °F (36.8 °C), Max:99.5 " "°F (37.5 °C)      /80   Pulse 113   Temp 98.3 °F (36.8 °C) (Oral)   Resp 14   Ht 177.8 cm (70\")   Wt 118 kg (260 lb 9.3 oz)   SpO2 98%   BMI 37.39 kg/m²         Physical Exam     General: The patient is nontoxic-appearing lying in bed no acute distress  HEENT: Sclera anicteric.  Scalp incision appears to be healing well.  There is no surrounding erythema or drainage.  Lungs: Occasional scattered rhonchi anteriorly  Neuro: Alert, oriented, speech clear  Psych: Pleasant cooperative      Line/IV site: PICCupper arm right, condition patent and no redness left upper extremity peripheral IV tender.  No palpable cord     Results Review:    I reviewed the patient's new clinical results.    Lab Results:  CBC:   Lab Results   Lab 07/05/22  0754 07/06/22  0740 07/10/22  0219   WBC 11.26* 8.48 6.96   HEMOGLOBIN 10.8* 10.3* 9.6*   HEMATOCRIT 31.7* 32.5* 30.3*   PLATELETS 278 261 259         CMP:   Lab Results   Lab 07/05/22 0754 07/06/22 0740 07/10/22  0219   SODIUM 136 138 138   POTASSIUM 4.8 3.8 3.5   CHLORIDE 101 103 105   CO2 22.0 26.0 22.0   BUN 11 9 6*   CREATININE 1.39* 0.77 0.67*   CALCIUM 8.6 8.7 8.2*   BILIRUBIN 0.5 0.5 0.3   ALK PHOS 103 100 100   ALT (SGPT) 18 16 9   AST (SGOT) 12 11 9   GLUCOSE 226* 102* 147*       Estimated Creatinine Clearance: 139.6 mL/min (A) (by C-G formula based on SCr of 0.67 mg/dL (L)).    Culture Results:    Microbiology Results (last 10 days)     Procedure Component Value - Date/Time    Urine Culture - Urine, Urine, Random Void [[id number]]  (Normal) Collected: 07/09/22 1032    Lab Status: Final result Specimen: Urine, Random Void Updated: 07/10/22 0654     Urine Culture No growth    Urine Culture - Urine, Urine, Catheter In/Out [[id number]]  (Normal) Collected: 07/05/22 1846    Lab Status: Final result Specimen: Urine, Catheter In/Out Updated: 07/06/22 2017     Urine Culture No growth    Anaerobic Culture - Tissue, Cranium [[id number]] Collected: 07/01/22 3862    Lab Status: " Final result Specimen: Tissue from Cranium Updated: 07/06/22 0544     Anaerobic Culture No anaerobes isolated at 5 days    Tissue / Bone Culture - Tissue, Cranium [[id number]]  (Abnormal)  (Susceptibility) Collected: 07/01/22 1759    Lab Status: Final result Specimen: Tissue from Cranium Updated: 07/03/22 0947     Tissue Culture Scant growth (1+) Serratia marcescens     Gram Stain Moderate (3+) WBCs seen      Moderate (3+) Red blood cells      No organisms seen    Susceptibility      Serratia marcescens      STALIN      Cefepime Susceptible      Ceftazidime Susceptible      Ceftriaxone Susceptible      Gentamicin Susceptible      Levofloxacin Susceptible      Piperacillin + Tazobactam Susceptible      Tetracycline Resistant     Trimethoprim + Sulfamethoxazole Susceptible                       Susceptibility Comments     Serratia marcescens    Cefazolin sensitivity will not be reported for Enterobacteriaceae in non-urine isolates. If cefazolin is preferred, please call the microbiology lab to request an E-test.  With the exception of urinary-sourced infections, aminoglycosides should not be used as monotherapy.             Wound Culture - Wound, Cranium [[id number]]  (Abnormal)  (Susceptibility) Collected: 07/01/22 1747    Lab Status: Final result Specimen: Wound from Cranium Updated: 07/03/22 0953     Wound Culture Scant growth (1+) Serratia marcescens     Gram Stain Many (4+) WBCs seen      No organisms seen    Susceptibility      Serratia marcescens      STALIN      Cefepime Susceptible      Ceftazidime Susceptible      Ceftriaxone Susceptible      Gentamicin Susceptible      Levofloxacin Susceptible      Piperacillin + Tazobactam Susceptible      Tetracycline Resistant     Trimethoprim + Sulfamethoxazole Susceptible                       Susceptibility Comments     Serratia marcescens    Cefazolin sensitivity will not be reported for Enterobacteriaceae in non-urine isolates. If cefazolin is preferred, please call the  microbiology lab to request an E-test.  With the exception of urinary-sourced infections, aminoglycosides should not be used as monotherapy.             Blood Culture - Blood, Arm, Left [[id number]]  (Normal) Collected: 06/30/22 2030    Lab Status: Final result Specimen: Blood from Arm, Left Updated: 07/05/22 2232     Blood Culture No growth at 5 days    Blood Culture - Blood, Arm, Left [[id number]]  (Normal) Collected: 06/30/22 2026    Lab Status: Final result Specimen: Blood from Arm, Left Updated: 07/05/22 2232     Blood Culture No growth at 5 days    Urine Culture - Urine, Urine, Clean Catch [[id number]]  (Abnormal) Collected: 06/30/22 1947    Lab Status: Final result Specimen: Urine, Clean Catch Updated: 07/02/22 0933     Urine Culture <10,000 CFU/mL Gram Positive Cocci     Comment: Call if further workup needed.         Narrative:      Colonization of the urinary tract without infection is common. Treatment is discouraged unless the patient is symptomatic, pregnant, or undergoing an invasive urologic procedure.    Body Fluid Culture - Body Fluid, Scalp [[id number]]  (Abnormal)  (Susceptibility) Collected: 06/30/22 1618    Lab Status: Final result Specimen: Body Fluid from Scalp Updated: 07/03/22 0954     Body Fluid Culture Heavy growth (4+) Serratia marcescens      Scant growth (1+) Normal Skin Asmita     Gram Stain Many (4+) WBCs per low power field      Few (2+) Gram negative bacilli    Susceptibility      Serratia marcescens      STALIN      Cefepime Susceptible      Ceftazidime Susceptible      Ceftriaxone Susceptible      Gentamicin Susceptible      Levofloxacin Susceptible      Piperacillin + Tazobactam Susceptible      Trimethoprim + Sulfamethoxazole Susceptible                       Susceptibility Comments     Serratia marcescens    Cefazolin sensitivity will not be reported for Enterobacteriaceae in non-urine isolates. If cefazolin is preferred, please call the microbiology lab to request an E-test.  With the  exception of urinary-sourced infections, aminoglycosides should not be used as monotherapy.             COVID-19, [facility]OTT IN-HOUSE,NASAL Swab (NO TRANSPORT MEDIA) 2 HR TAT - Swab, Nasopharynx [[id number]]  (Normal) Collected: 06/30/22 1230    Lab Status: Final result Specimen: Swab from Nasopharynx Updated: 06/30/22 1316     COVID19 Presumptive Negative    Narrative:      Fact sheet for providers: https://www.fda.gov/media/039882/download     Fact sheet for patients: https://www.fda.gov/media/667092/download    Test performed by PCR.  If inconsistent with clinical signs and symptoms patient should be tested with different authorized molecular test.               Radiology:   Imaging Results (Last 72 Hours)     ** No results found for the last 72 hours. **          Assessment & Plan     Active Hospital Problems    Diagnosis    • Post-operative infection    • S/P craniotomy      Added automatically from request for surgery [id number]     • Paroxysmal atrial fibrillation with rapid ventricular response ([facility])    • Coronary artery disease    • Type 2 diabetes mellitus with hyperglycemia and peripheral neuropathy, with long-term current use of insulin ([facility])    • Hypertension    • GERD (gastroesophageal reflux disease)        IMPRESSION:  1. Subgaleal/epidural infection with Serratia marcescens and patient is status post recent craniotomy for meningioma  2. Tender IV site left upper extremity  3. Acute kidney injury-resolved  4. Diabetes mellitus  5. Coronary artery disease      RECOMMENDATION:   · Continue cefepime  · Tentative stop date for cefepime July 22, 2022  · Discussed with SELENA Gonzalez.  She was already aware of the tender IV site when she flushes IV this morning and plans to remove that.  Patient has a single-lumen PICC line in place on the right        Lillian Pike MD  07/10/22  09:13 CDT        Electronically signed by Lillian Pike MD at 07/10/22 0892

## 2022-07-11 NOTE — PLAN OF CARE
Goal Outcome Evaluation:  Plan of Care Reviewed With: patient        Progress: no change     Pt till in afib with rvr. Transferred to [facility]. A&Ox 4 at time of transfer. Expressed concern about his truck and cigarettes.

## 2022-07-11 NOTE — PROGRESS NOTES
[facility] Fall Risk Assessment Note    Name: Elijah Escobar  MRN: [id number]  CSN: [id number]  Room: [facility]  Admit Date/Time: 7/11/2022  3:17 PM.    Currently ordered medications associated with an increased risk for fall include:    Scheduled Meds:  amLODIPine, 10 mg, Oral, Q24H  gabapentin, 300 mg, Oral, TID  insulin detemir, 30 Units, Subcutaneous, Nightly  insulin lispro, 0-14 Units, Subcutaneous, 4x Daily AC & at Bedtime  isosorbide mononitrate, 60 mg, Oral, Q24H  levETIRAcetam, 500 mg, Oral, BID  lisinopril, 40 mg, Oral, Daily  metoprolol tartrate, 50 mg, Oral, Q12H  senna-docusate sodium, 2 tablet, Oral, Daily    Continuous Infusions:  dilTIAZem, 5-15 mg/hr  niCARdipine, 5-15 mg/hr    PRN Meds:  •  polyethylene glycol   •  bisacodyl  •  labetalol  •  ondansetron     Jonathan Cortes PharmD  07/11/22 16:01 CDT

## 2022-07-11 NOTE — SIGNIFICANT NOTE
07/11/22 0400   ECG   Lead Monitored (S)  Lead II   Rhythm (S)  atrial rhythm   Atrial Rhythm (S)  atrial fibrillation   Frequency/Ectopy (S)  multifocal PVCs;runs of ventricular tachycardia;other (see comments)  (9 beats)   QTc Interval (ms) (S)  482     V-tach run printed and placed in chart. ECG obtained reflecting A-fib RVR (as being treated by diltiazem titrations). Patient was normal sinus/sinus tach for about 3 hours last night then back to A-fib. Highest A-fib HR noted was 150's. SBP ranged from 108-160's (prn lobetalol given x1). Pt asymptomatic to cardiac/hemodynamic activity.

## 2022-07-12 LAB
GLUCOSE BLDC GLUCOMTR-MCNC: 155 MG/DL (ref 70–130)
GLUCOSE BLDC GLUCOMTR-MCNC: 159 MG/DL (ref 70–130)
GLUCOSE BLDC GLUCOMTR-MCNC: 194 MG/DL (ref 70–130)
GLUCOSE BLDC GLUCOMTR-MCNC: 206 MG/DL (ref 70–130)
PREALB SERPL-MCNC: 15 MG/DL (ref 20–40)

## 2022-07-12 PROCEDURE — 63710000001 INSULIN DETEMIR PER 5 UNITS: Performed by: INTERNAL MEDICINE

## 2022-07-12 PROCEDURE — 63710000001 INSULIN LISPRO (HUMAN) PER 5 UNITS: Performed by: INTERNAL MEDICINE

## 2022-07-12 PROCEDURE — 82962 GLUCOSE BLOOD TEST: CPT

## 2022-07-12 PROCEDURE — 84134 ASSAY OF PREALBUMIN: CPT | Performed by: INTERNAL MEDICINE

## 2022-07-12 PROCEDURE — 97116 GAIT TRAINING THERAPY: CPT

## 2022-07-12 PROCEDURE — 99024 POSTOP FOLLOW-UP VISIT: CPT | Performed by: NURSE PRACTITIONER

## 2022-07-12 PROCEDURE — 97166 OT EVAL MOD COMPLEX 45 MIN: CPT | Performed by: OCCUPATIONAL THERAPIST

## 2022-07-12 PROCEDURE — 25010000002 CEFEPIME PER 500 MG: Performed by: INTERNAL MEDICINE

## 2022-07-12 PROCEDURE — 97535 SELF CARE MNGMENT TRAINING: CPT | Performed by: OCCUPATIONAL THERAPIST

## 2022-07-12 PROCEDURE — 96125 COGNITIVE TEST BY HC PRO: CPT

## 2022-07-12 PROCEDURE — 97162 PT EVAL MOD COMPLEX 30 MIN: CPT

## 2022-07-12 RX ORDER — HYDROCODONE BITARTRATE AND ACETAMINOPHEN 10; 325 MG/1; MG/1
1 TABLET ORAL 4 TIMES DAILY
Status: DISCONTINUED | OUTPATIENT
Start: 2022-07-12 | End: 2022-07-14

## 2022-07-12 NOTE — PROGRESS NOTES
Adult Nutrition  Assessment/PES    Patient Name:  Elijah Escobar  YOB: 1955  MRN: [id number]  Admit Date:  7/11/2022    Assessment Date:  7/12/2022       Nutrition/Diet History     Row Name 07/12/22 1206          Nutrition/Diet History    Typical Intake (Food/Fluid/EN/PN) Pt alert but highly confused, suffers from short-term memory loss due to craniotomy, covered the same topics over and over during our conversation, currently on C.CHO, pt states he likes most foods and has a good appetite today     Factors Affecting Nutritional Intake cognitive status/motor function;fatigue                    Physical Findings     Row Name 07/12/22 1215          Physical Findings    Overall Physical Appearance Fatigued, confused, obese, rt anterior scalp incision, generalized edema, clear pale urine output, room air, last BM 7/7 per report, Tahir score 20                    Evaluation of Received Nutrient/Fluid Intake     Row Name 07/12/22 1220          Nutrient/Fluid Evaluation    Number of Days Evaluated Other (comment)  insufficient data <24 hr admit                  Malnutrition Severity Assessment     Row Name 07/12/22 1651          Malnutrition Severity Assessment    Malnutrition Type Acute Disease or Injury - Related Malnutrition            Insufficient Energy Intake     Insufficient Energy Intake  < or equal to 50% of est. energy requirement for > or equal to 5d)            Unintentional Weight Loss     Unintentional Weight Loss Findings Severe     Unintentional Weight Loss  Weight loss of 5% in one month  19lbs in <1 month            Muscle Loss    Loss of Muscle Mass Findings Severe     Worship Region Moderate - slight depression     Clavicle Bone Region Moderate - some protrusion in females, visible in males     Dorsal Hand Region Moderate - slight depression            Fat Loss    Subcutaneous Fat Loss Findings Severe     Orbital Region  Moderate -  somewhat hollowness, slightly dark circles             Criteria Met (Must meet criteria for severity in at least 2 of these categories: M Wasting, Fat Loss, Fluid, Secondary Signs, Wt. Status, Intake)    Patient meets criteria for  Severe Malnutrition                 Problem/Interventions:   Problem 1     Row Name 07/12/22 1221          Nutrition Diagnoses Problem 1    Problem 1 Malnutrition     Etiology (related to) Medical Diagnosis     Neurological Craniotomy  for a tumor     Signs/Symptoms (evidenced by) Report/Observation;Unintended Weight Change;Other (comment)  Per MSA     Percent (%) intake recorded --     Over number of meals --     Unintended Weight Change Loss     Number of Pounds Lost 19 lbs (6.8%)     Weight loss time period < 1 month with areas of muscle/fat wasting  per report     Appetite Hungry;Improved                  Intervention Goal     Row Name 07/12/22 1226          Intervention Goal    General Meet nutritional needs for age/condition;Disease management/therapy;Reduce/improve symptoms     PO Tolerate PO;Meet estimated needs     Weight No significant weight loss                Nutrition Intervention     Row Name 07/12/22 1227          Nutrition Intervention    RD/Tech Action Follow Tx progress;Care plan reviewd;Encourage intake                Nutrition Prescription     Row Name 07/12/22 1227          Nutrition Prescription PO    PO Prescription Other (comment)  Continue Same Protocol                Education/Evaluation     Row Name 07/12/22 1227          Education    Education Will Instruct as appropriate            Monitor/Evaluation    Monitor Per protocol;PO intake                 Electronically signed by:  Aye Koenig MS,RDN,LD  07/12/22 16:57 CDT

## 2022-07-12 NOTE — PROGRESS NOTES
[facility] Fall Risk Assessment Note    Name: Elijah Escobar  MRN: [id number]  CSN: [id number]  Room: [facility]  Admit Date/Time: 7/11/2022  3:17 PM.    Currently ordered medications associated with an increased risk for fall include:    Scheduled Meds:  dilTIAZem CD, 120 mg, Oral, Q24H  famotidine, 40 mg, Oral, Daily  gabapentin, 300 mg, Oral, TID  insulin detemir, 30 Units, Subcutaneous, Nightly  insulin lispro, 0-14 Units, Subcutaneous, 4x Daily AC & at Bedtime  isosorbide mononitrate, 60 mg, Oral, Q24H  levETIRAcetam, 500 mg, Oral, BID  lisinopril, 40 mg, Oral, Daily  metoprolol tartrate, 50 mg, Oral, Q12H    Continuous Infusions:     PRN Meds:  •  labetalol  •  ondansetron **OR** ondansetron    Hussein Jackson, Pharmacy Intern  07/12/22 14:13 CDT

## 2022-07-12 NOTE — PAYOR COMM NOTE
"DC TO [facility] 7-11-22  [id number]   [phone]    Virgil Escobar (66 y.o. Male)             Date of Birth   1955    Social Security Number       Address   [address] [place] [id number]    Home Phone   [phone]    MRN   [id number]       Anabaptist   Non-Buddhism    Marital Status                               Admission Date   6/30/22    Admission Type   Urgent    Admitting Provider   Felipe Banerjee MD    Attending Provider       Department, Room/Bed   [facility] CARDIAC CARE, C002/1       Discharge Date   7/11/2022    Discharge Disposition   Long Term Care (DC - External)    Discharge Destination                               Attending Provider: (none)   Allergies: No Known Allergies    Isolation: None   Infection: None   Code Status: Prior   Advance Care Planning Activity    Ht: 177.8 cm (70\")   Wt: 118 kg (260 lb 9.3 oz)    Admission Cmt: None   Principal Problem: None                Active Insurance as of 6/30/2022     Primary Coverage     Payor Plan Insurance Group Employer/Plan Group    ANTH MEDICARE REPLACEMENT ANTH MEDICARE ADVANTAGE [id number]     Payor Plan Address Payor Plan Phone Number Payor Plan Fax Number Effective Dates    [address] [phone]  1/1/2022 - None Entered    [facility] [id number]       Subscriber Name Subscriber Birth Date Member ID       VIRGIL ESCOBAR 1955 [id number]                 Emergency Contacts      (Rel.) Home Phone Work Phone Mobile Phone    Cathy Guzmán (Son) -- -- [phone]    Manju Lua (Relative) -- -- [phone]    MAYTE ESCOBAR (Spouse) [phone] -- --    david lua (Sister) [phone] -- --               Discharge Summary      Rahul Arnold, APRN at 07/11/22 1253          DISCHARGE SUMMARY FROM HOSPITAL    Date of Discharge:  7/11/2022    Presenting Diagnosis/History of Present Illness  Post-operative infection [T81.40XA]    Medical History   Patient Active Problem List "   Diagnosis   • Meningioma s/p craniotomy   • Hypertension   • GERD (gastroesophageal reflux disease)   • Coronary artery disease   • Morbid obesity (HCC)   • Type 2 diabetes mellitus with hyperglycemia and peripheral neuropathy, with long-term current use of insulin (HCC)   • Leukocytosis   • Other specified anemias   • Paroxysmal atrial fibrillation with rapid ventricular response (HCC)   • Atrial fibrillation with RVR (HCC)   • Post-operative infection   • S/P craniotomy     Discharge Diagnosis/ Active Hospital Problems:   Active Hospital Problems    Diagnosis    • Post-operative infection    • S/P craniotomy      Added automatically from request for surgery [id number]     • Paroxysmal atrial fibrillation with rapid ventricular response (HCC)    • Coronary artery disease    • Type 2 diabetes mellitus with hyperglycemia and peripheral neuropathy, with long-term current use of insulin (HCC)    • Hypertension    • GERD (gastroesophageal reflux disease)      Hospital Course  Patient is a 66 y.o. male presented with a significant medical history of recent craniotomy for tumor, 6/16/2022, coronary artery disease, A. fib, diabetes, erectile dysfunction, GERD, hypertension, hyperlipidemia. He presents with a new problem of a persistent right sided headache, particularly over the right temporal region and dizziness with standing.  Physical exam findings of tense but fluctuant area of edema from the mid frontal to right parietal region of the scalp, the area is tender to palpation, periorbital lesions with purulent drainage from the left eye, scalp incision is clean, dry, and intact, bright and awake, oriented x3, speech is clear, follows commands without prompting showing thumbs up and 2 fingers bilaterally, no pronator drift, left dysmetria, gross hyporeflexia, and bilateral Babinski.  CT of the head from 6/23/2022, showing expected postsurgical changes to the right frontal lobe, small amount of fluid collection and  pneumocephalus in the right subdural space, small amount of vasogenic edema with 4.3 mm left-to-right midline shift.  Repeat CT of the head obtained 6/30/2022 shows no acute intracranial blood products, unresolved vasogenic edema, midline shift resolved, increased left frontal and temporal soft tissue swelling.  MRI of the brain from 6/30/2022 shows a 7.9 x 6.5 x 0.9 cm rim-enhancing fluid collection overlying the bone flap concerning for pseudomeningocele versus infection.    On 6/16/2022 the patient was brought to the main operating room where he underwent an uneventful craniotomy for tumor removal.  Postoperatively he  did extremely well and his neurological exam remained unchanged.  He was kept in the hospital for close neurologic monitoring, airway observation, and for pain control.  His JUAN drain has been removed.  He has been able to ambulate, tolerate oral diet, oral pain medications and void.     Prior to discharge Mr. Escobar experience an episode of A. fib with RVR.  He was evaluated by both hospital services and cardiology.  He was initially placed on a Cardizem drip and carvedilol was transitioned back to sotalol.  An echocardiogram was obtained and he was found to have a left ventricular ejection fraction of 55-60% with normal left ventricular systolic function.  His pulse rate normalized and he was deemed safe for discharge home by cardiology.       Mr. Escobar return to [facility] on 6/24/2022 with A. fib with RVR and was subsequently found to have a urinary tract infection.  He has since been discharged with reports of well-controlled heart rate and blood pressure.     He returned to [facility] ED on 6/30/2022 with complaints of persistent right-sided headache and dizziness with standing. He additionally reports 1 fall from standing height while packing groceries.  He denies hitting his head or loss of consciousness.  States he completed full course of steroid taper and is taking  Keppra as prescribed.  He additionally denies fevers, chills, seizure-like activity, visual disturbances, difficulty with words or word finding, facial asymmetry or dysesthesias, unilateral weakness, nausea or vomiting.     On 7/1/2020 the patient was brought to the main operating room where he underwent an uneventful incision/drainage and washout and craniectomy.  Postoperatively he has done well and his neurological exam improved.  He has been kept in the hospital for close neurologic monitoring, airway observation, and for pain control.  He has been able to ambulate, tolerate oral diet, oral pain medications and void. He has been evaluated by infectious disease with recommendations as follows: Continue cefepime with tentative stop date 7/22/2022.  A single-lumen PICC line was placed to the right upper extremity.  He has also been evaluated by cardiology for continuing management of A. fib, as well as physical therapy and Occupational Therapy and deemed safe for discharge to [facility].  He has been fitted for cranial helmet which should remain at bedside and use at all times while out of bed.  Additional information provided in hospital discharge instructions.    Procedures Performed  Procedure(s):  CRANIECTOMY WITH INCISIONAL WASHOUT     Consults:   Consults     Date and Time Order Name Status Description    7/10/2022 10:40 AM Inpatient Cardiology Consult Completed     7/6/2022  9:05 AM Inpatient Hospitalist Consult      7/1/2022  7:13 AM Inpatient Infectious Diseases Consult Completed     6/30/2022  9:28 PM Inpatient Hospitalist Consult Completed     6/24/2022  4:53 AM Inpatient Neurosurgery Consult Completed     6/17/2022  8:39 AM Inpatient Hospitalist Consult Completed         Pertinent Test Results:   CT Head Without Contrast    Result Date: 7/5/2022  1. Removal of the right frontal the craniotomy flap. The edema of the right frontal lobe at the site of previous craniotomy and bulging  of the frontal lobe through the craniotomy. 2. Right frontal subdural fluid accumulation extending medially into the right parafalcine region which was not noted on the previous study. 3. Subgaleal air and fluid accumulation in the right frontal region as detailed above.            This report was finalized on 07/05/2022 08:35 by Dr. Michelle Vigil MD.    CT Head Without Contrast    Result Date: 6/30/2022  Impression:  1. Expected postoperative changes following recent right frontal convexity meningioma resection. 2. No acute intracranial hemorrhage, ischemia or worsening mass effect/edema. This report was finalized on 06/30/2022 13:15 by Dr Rudy Orta, .    CT Head Without Contrast    Result Date: 6/24/2022  1. The postsurgical changes/tumor resection in the right frontal lobe. Right frontal craniotomy with subgaleal and subdural fluid accumulation and small amount of air. 2. Right frontal vasogenic edema with mass effect and mild shift to the left. 3. Ethmoid sinusitis.  The above study was initially reviewed and reported by [facility]. I do not find any discrepancies.          This report was finalized on 06/24/2022 06:31 by Dr. Michelle Vigil MD.    CT Head Without Contrast    Result Date: 6/16/2022  1. Status post craniotomy on the right with resection of a tumor from the frontal dural space. A subdural drain is located in this area. There is air in the subdural space. There is minimal hemorrhage in the subdural space. 2. Vasogenic edema in the right frontal white matter remains stable. There is continued mild mass effect with sulcal effacement and mild compression of the right lateral ventricle.  This report was finalized on 06/16/2022 17:43 by Dr. Arian Stauffer MD.    MRI Brain With & Without Contrast    Result Date: 6/30/2022  Impression:  1. Recent right frontal convexity meningioma resection. There is an approximately 7.9 x 6.5 x 0.9 cm rim-enhancing fluid collection which is seen overlying the bone  flap, suspicious for a pseudomeningocele. This is less than 1 cm deep to the skin incision and given the amount of rim enhancement which is present, it is difficult to exclude infection. I will say that this collection does not image as an abscess on diffusion, with no diffusion restriction noted. Similarly, there is no subdural empyema present. 2. Pachymeningeal enhancement is also present along the right cerebral convexity although this is not unexpected in the postoperative setting.  Findings and recommendations were discussed with CYNTHIA MORSE on 6/30/2022 at 2:57 PM CDT. This report was finalized on 06/30/2022 14:58 by Dr Rudy Orta, .    XR Chest 1 View    Result Date: 6/30/2022   No acute findings. This report was finalized on 06/30/2022 21:57 by Dr. Mk Chinchilla MD.    XR Chest 1 View    Result Date: 6/24/2022  1. No radiographic evidence of acute cardiopulmonary process.   This report was finalized on 06/24/2022 07:04 by Dr. Dontae Grijalva MD.    CT Angiogram Chest    Result Date: 6/24/2022  1. There is no evidence of embolic disease. 2. Coronary calcifications are present. 3. Prominent pulmonary artery. This may be associated with pulmonary arterial hypertension.   This report was finalized on 06/24/2022 07:53 by Dr. Dontae Grijalva MD.    CBC:   Results from last 7 days   Lab Units 07/10/22  0219 07/06/22  0740 07/05/22  0754   WBC 10*3/mm3 6.96 8.48 11.26*   HEMOGLOBIN g/dL 9.6* 10.3* 10.8*   HEMATOCRIT % 30.3* 32.5* 31.7*   PLATELETS 10*3/mm3 259 261 278     BMP:  Results from last 7 days   Lab Units 07/10/22  0219 07/06/22  0740 07/05/22  0754   SODIUM mmol/L 138 138 136   POTASSIUM mmol/L 3.5 3.8 4.8   CHLORIDE mmol/L 105 103 101   CO2 mmol/L 22.0 26.0 22.0   BUN mg/dL 6* 9 11   CREATININE mg/dL 0.67* 0.77 1.39*   GLUCOSE mg/dL 147* 102* 226*   CALCIUM mg/dL 8.2* 8.7 8.6   ALT (SGPT) U/L 9 16 18     Culture Results:   Urine Culture   Date Value Ref Range Status   07/09/2022 No growth   Final   07/05/2022 No growth  Final     Condition on Discharge:  Stable    Vital Signs  Temp:  [98.1 °F (36.7 °C)-98.8 °F (37.1 °C)] 98.8 °F (37.1 °C)  Heart Rate:  [] 108  Resp:  [15-22] 17  BP: (102-158)/() 145/78    Physical Exam:   Physical Exam  Vitals and nursing note reviewed.   Constitutional:       General: He is not in acute distress.     Appearance: Normal appearance. He is well-developed and well-groomed. He is obese. He is not ill-appearing, toxic-appearing or diaphoretic.      Comments: BMI 37.39   HENT:      Head: Normocephalic and atraumatic.        Right Ear: Hearing normal.      Left Ear: Hearing normal.   Eyes:      Extraocular Movements: EOM normal.      Conjunctiva/sclera: Conjunctivae normal.      Pupils: Pupils are equal, round, and reactive to light.   Neck:      Trachea: Trachea normal.   Cardiovascular:      Rate and Rhythm: Normal rate and regular rhythm.   Pulmonary:      Effort: Pulmonary effort is normal. No tachypnea, bradypnea, accessory muscle usage or respiratory distress.   Abdominal:      Palpations: Abdomen is soft.   Musculoskeletal:      Cervical back: Full passive range of motion without pain and neck supple.   Skin:     General: Skin is warm and dry.   Neurological:      Mental Status: He is alert and oriented to person, place, and time.      GCS: GCS eye subscore is 4. GCS verbal subscore is 5. GCS motor subscore is 6.   Psychiatric:         Speech: Speech normal.         Behavior: Behavior normal. Behavior is cooperative.          Neurologic Exam     Mental Status   Oriented to person, place, and time.   Attention: normal. Concentration: normal.   Speech: speech is normal   Level of consciousness: alert    Bright and awake.  Oriented x 3.  Follows commands without prompting showing thumbs up and 2 fingers bilaterally.     Cranial Nerves     CN II   Visual fields full to confrontation.     CN III, IV, VI   Pupils are equal, round, and reactive to  light.  Extraocular motions are normal.     CN V   Facial sensation intact.     CN VII   Facial expression full, symmetric.     CN VIII   CN VIII normal.     CN IX, X   CN IX normal.     CN XI   CN XI normal.     Motor Exam   Right arm tone: normal  Left arm tone: normal  Right leg tone: normal  Left leg tone: normal    Strength   Right deltoid: 5/5  Left deltoid: 5/5  Right biceps: 5/5  Left biceps: 5/5  Right triceps: 5/5  Left triceps: 5/5  Right wrist extension: 5/5  Left wrist extension: 5/5  Right iliopsoas: 5/5  Left iliopsoas: 5/5  Right quadriceps: 5/5  Left quadriceps: 5/5  Right anterior tibial: 5/5  Left anterior tibial: 5/5  Right gastroc: 5/5  Left gastroc: 5/5  Right EHL 5/5  Left EHL 5/5       Sensory Exam   Right arm light touch: normal  Left arm light touch: normal  Right leg light touch: normal  Left leg light touch: normal    Gait, Coordination, and Reflexes     Tremor   Resting tremor: absent  Intention tremor: absent  Action tremor: absent    Discharge Disposition  Long Term Care (DC - External)    Discharge Medications     Discharge Medications      New Medications      Instructions Start Date   acetaminophen 325 MG tablet  Commonly known as: TYLENOL   650 mg, Oral, Every 4 Hours PRN      amLODIPine 10 MG tablet  Commonly known as: NORVASC   10 mg, Oral, Every 24 Hours Scheduled   Start Date: July 12, 2022     aspirin 81 MG chewable tablet   81 mg, Oral, Daily   Start Date: July 12, 2022     bisacodyl 5 MG EC tablet  Commonly known as: DULCOLAX   5 mg, Oral, Daily PRN      bisacodyl 10 MG suppository  Commonly known as: DULCOLAX   10 mg, Rectal, Daily PRN      cefepime 2 G/ ML solution  Commonly known as: MAXIPIME   2 g, Intravenous, Every 8 Hours      dextrose 40 % gel  Commonly known as: GLUTOSE   15 g, Oral, Every 15 Minutes PRN      dextrose 50 % solution  Commonly known as: D50W   25 g, Intravenous, Every 15 Minutes PRN      dilTIAZem HCl-Sodium Chloride 125-0.7 MG/125ML-%  solution  Commonly known as: CARDIZEM   5-15 mg/hr (5-15 mg/hr), Intravenous, Titrated      famotidine 40 MG tablet  Commonly known as: PEPCID   40 mg, Oral, Daily   Start Date: July 12, 2022     glucagon (human recombinant) 1 MG injection  Commonly known as: GLUCAGEN DIAGNOSTIC   1 mg, Intramuscular, Every 15 Minutes PRN      insulin detemir 100 UNIT/ML injection  Commonly known as: LEVEMIR   30 Units, Subcutaneous, Nightly      Insulin Lispro 100 UNIT/ML injection  Commonly known as: humaLOG   0-14 Units, Subcutaneous, 4 Times Daily Before Meals & Nightly      isosorbide mononitrate 60 MG 24 hr tablet  Commonly known as: IMDUR   60 mg, Oral, Every 24 Hours Scheduled   Start Date: July 12, 2022     labetalol 5 MG/ML injection  Commonly known as: NORMODYNE,TRANDATE   10 mg, Intravenous, Every 6 Hours PRN      metoprolol tartrate 50 MG tablet  Commonly known as: LOPRESSOR   50 mg, Oral, Every 12 Hours Scheduled      niCARdipine  Commonly known as: CARDENE   5-15 mg/hr (5-15 mg/hr), Intravenous, Titrated      nicotine 14 MG/24HR patch  Commonly known as: NICODERM CQ   1 patch, Transdermal, Daily PRN      ondansetron 4 MG tablet  Commonly known as: ZOFRAN   4 mg, Oral, Every 6 Hours PRN      polyethylene glycol 17 g packet  Commonly known as: MIRALAX   17 g, Oral, Daily PRN         Changes to Medications      Instructions Start Date   sennosides-docusate 8.6-50 MG per tablet  Commonly known as: PERICOLACE  What changed:   · how much to take  · when to take this   2 tablets, Oral, 2 Times Daily         Continue These Medications      Instructions Start Date   atorvastatin 20 MG tablet  Commonly known as: LIPITOR   20 mg, Oral, Nightly      butalbital-acetaminophen-caffeine -40 MG per tablet  Commonly known as: FIORICET, ESGIC   1 tablet, Oral, Every 6 Hours PRN      gabapentin 300 MG capsule  Commonly known as: NEURONTIN   300 mg, Oral, 3 Times Daily, As needed      HYDROcodone-acetaminophen 7.5-325 MG per  tablet  Commonly known as: NORCO   1 tablet, Oral, Every 6 Hours PRN         Stop These Medications    dilTIAZem  MG 24 hr capsule  Commonly known as: CARDIZEM CD     ibuprofen 200 MG tablet  Commonly known as: ADVIL,MOTRIN     insulin aspart 100 UNIT/ML solution pen-injector sc pen  Commonly known as: novoLOG FLEXPEN     Lantus SoloStar 100 UNIT/ML injection pen  Generic drug: Insulin Glargine     levETIRAcetam 500 MG tablet  Commonly known as: Keppra     lisinopril 40 MG tablet  Commonly known as: PRINIVIL,ZESTRIL     omeprazole 20 MG capsule  Commonly known as: priLOSEC     sotalol 120 MG tablet  Commonly known as: BETAPACE          Discharge Diet:   Diet Instructions     Advance Diet As Tolerated           Activity at Discharge:   Activity Instructions     Activity as Tolerated      Driving Restrictions      Type of Restriction: Driving    Driving Restrictions: No Driving While Taking Narcotics    Gradually Increase Activity Until at Pre-Hospitalization Level      Lifting Restrictions      Type of Restriction: Lifting    Lifting Restrictions: Lifting Restriction (Indicate Limit)    Weight Limit (Pounds): 8    Length of Lifting Restriction: 2-3 WEEKS         Follow-up Appointments  Future Appointments   Date Time Provider Department Center   7/20/2022 10:45 AM [facility] MRI 2  PAD MR BI PAD   7/20/2022 12:00 PM Flaco Portillo MD [facility]     Additional Instructions for the Follow-ups that You Need to Schedule     Call MD With Problems / Concerns   As directed      Instructions: CALL WITH ANY NEW OR ADDITIONAL CONCERNS.    Order Comments: Instructions: CALL WITH ANY NEW OR ADDITIONAL CONCERNS.          Discharge Follow-up with Specified Provider: Dr. Portillo; 6 Weeks   As directed      To: Dr. Portillo    Follow Up: 6 Weeks         Discharge Follow-up with Specified Provider: ADITYA Gallego; 2 Weeks   As directed      To: ADITYA Gallego    Follow Up: 2 Weeks             Test Results  Pending at Discharge  None     ADITYA Cespedes  07/11/22  12:53 CDT    [id number]      Electronically signed by Rahul Arnold APRN at 07/11/22 6144

## 2022-07-12 NOTE — H&P
Cale Madsen M.D.  ADITY[facility] Cruz          Internal Medicine History and Physical      Name: Elijah Escobar  MRN: [id number]     Acct: [id number]  Room: 9/    Admit Date: 7/11/2022  PCP: Dylan Lama APRN    Chief Complaint:     Need for continued rehabilitation efforts    History Obtained From:     chart review and the patient    History of Present Illness:      Elijah Escobar is a  66 y.o.  male who presents with need for continued rehabilitation efforts following recent acute care stay. The patient had been in his usual state of health when he developed increased right side headache with dizziness. He underwent recent craniotomy for meningioma on 6/16 per neurosurgery. His post-operative course was somewhat complicated by atrial fibrillation with RVR. He discharged to home and returned on 6/24 and underwent CT that showed expected post-surgical changes with pneumocephalus and vasogenic edema and left to right midline shift. Further workup revealed evidence UTI as well as atrial fibrillation with RVR. He was treated with antibiotics and rate control medications. He discharged to home and returned on 6/30 with complaints of right side headache and dizziness following a fall without head trauma.  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 Cultures returned positive for serratia marcescens. The patient was seen in consultation by ID for antibiotic management. He transferred to our facility for continued IV antibiotics and rehabilitation efforts. He had some increased confusion with combative behavior last night, but appears to be alert and oriented x 3 today.     Past Medical History:     Past Medical History:   Diagnosis Date    Brain tumor (HCC)     Coronary artery disease     COVID-19 vaccine series completed     MODERNA X 3;  LAST DOSE 3/2022    Diabetes (HCC)     Erectile dysfunction     GERD (gastroesophageal reflux disease)     Hypercholesteremia     Hypertension         Past Surgical History:     Past Surgical History:   Procedure Laterality Date    BALLOON ANGIOPLASTY, ARTERY Right     COLONOSCOPY      CRANIECTOMY Right 7/1/2022    Procedure: CRANIECTOMY WITH INCISIONAL WASHOUT;  Surgeon: Felipe Banerjee MD;  Location: [facility] OR;  Service: Neurosurgery;  Laterality: Right;    CRANIOTOMY FOR TUMOR Right 6/16/2022    Procedure: CRANIOTOMY FOR TUMOR STERIOTACTIC WITH BRAIN LAB, right;  Surgeon: Flaco Portillo MD;  Location: [facility] OR;  Service: Neurosurgery;  Laterality: Right;        Medications Prior to Admission:       Prior to Admission medications    Medication Sig Start Date End Date Taking? Authorizing Provider   acetaminophen (TYLENOL) 325 MG tablet Take 2 tablets by mouth Every 4 (Four) Hours As Needed for Mild Pain . 7/11/22   Rahul Arnold APRN   amLODIPine (NORVASC) 10 MG tablet Take 1 tablet by mouth Daily. 7/12/22   Rahul Arnold APRN   aspirin 81 MG chewable tablet Chew 1 tablet Daily. 7/12/22   Rahul Arnold APRN   atorvastatin (LIPITOR) 20 MG tablet Take 20 mg by mouth Every Night. 2/8/22   Provider, MD Helene   bisacodyl (DULCOLAX) 10 MG suppository Insert 1 suppository into the rectum Daily As Needed for Constipation (Use if bisacodyl oral is ineffective). 7/11/22   Rahul Arnold APRN   bisacodyl (DULCOLAX) 5 MG EC tablet Take 1 tablet by mouth Daily As Needed for Constipation (Use if polyethylene glycol is ineffective). 7/11/22   Rahul Arnold APRN   butalbital-acetaminophen-caffeine (FIORICET, ESGIC) -40 MG per tablet Take 1 tablet by mouth Every 6 (Six) Hours As Needed for Headache.    Provider, MD Helene   cefepime (MAXIPIME) 2 g/100 mL 0.9% NS (mbp) Infuse 100 mL into a venous catheter Every 8 (Eight) Hours for 35 doses. Indications: Infection of the Skin and/or Soft Tissue  7/11/22 7/23/22  Rahul Arnold APRN   dextrose (D50W) 50 % solution Infuse 50 mL into a venous catheter Every 15 (Fifteen) Minutes As Needed for Low Blood Sugar (Blood Sugar Less Than 70). 7/11/22   Rahul Arnold APRN   dextrose (GLUTOSE) 40 % gel Take 15 g by mouth Every 15 (Fifteen) Minutes As Needed for Low Blood Sugar (Blood sugar less than 70). 7/11/22   Rahul Arnold APRN   dilTIAZem HCl-Sodium Chloride (CARDIZEM) 125-0.7 MG/125ML-% solution Infuse 5-15 mg/hr into a venous catheter Dose Adjusted By Provider As Needed. 7/11/22   Rahul Arnold APRN   famotidine (PEPCID) 40 MG tablet Take 1 tablet by mouth Daily. 7/12/22   Rahul Arnold APRN   gabapentin (NEURONTIN) 300 MG capsule Take 300 mg by mouth 3 (Three) Times a Day. As needed 3/7/22   Provider, MD Helene   glucagon, human recombinant, (GLUCAGEN DIAGNOSTIC) 1 MG injection Inject 1 mg into the appropriate muscle as directed by prescriber Every 15 (Fifteen) Minutes As Needed (Blood Glucose Less Than 70) for up to 360 days. 7/11/22 7/6/23  Rahul Arnold APRN   HYDROcodone-acetaminophen (NORCO) 7.5-325 MG per tablet Take 1 tablet by mouth Every 6 (Six) Hours As Needed for Moderate Pain .    Provider, MD Helene   insulin detemir (LEVEMIR) 100 UNIT/ML injection Inject 30 Units under the skin into the appropriate area as directed Every Night. 7/11/22   Rahul Arnold APRN   Insulin Lispro (humaLOG) 100 UNIT/ML injection Inject 0-14 Units under the skin into the appropriate area as directed 4 (Four) Times a Day Before Meals & at Bedtime. 7/11/22   Rahul Arnold APRN   isosorbide mononitrate (IMDUR) 60 MG 24 hr tablet Take 1 tablet by mouth Daily. 7/12/22   Rahul Arnold APRN   labetalol (NORMODYNE,TRANDATE) 5 MG/ML injection Infuse 2 mL into a venous catheter Every 6 (Six) Hours As Needed for High Blood Pressure. 7/11/22   Rahul Arnold APRN   metoprolol tartrate (LOPRESSOR) 50 MG tablet Take 1 tablet by mouth Every 12 (Twelve) Hours. 7/11/22    Rahul Arnold APRN   niCARdipine (CARDENE) 25 mg in 250 mL NS infusion Infuse 5-15 mg/hr into a venous catheter Dose Adjusted By Provider As Needed. 7/11/22   Rahul Arnold APRN   nicotine (NICODERM CQ) 14 MG/24HR patch Place 1 patch on the skin as directed by provider Daily As Needed (Smoker withdrawal). 7/11/22   Rahul Arnold APRN   ondansetron (ZOFRAN) 4 MG tablet Take 1 tablet by mouth Every 6 (Six) Hours As Needed for Nausea or Vomiting. 7/11/22   Rahul Arnold APRN   polyethylene glycol (MIRALAX) 17 g packet Take 17 g by mouth Daily As Needed (Use if senna-docusate is ineffective). 7/11/22   Rahul Arnold APRN   sennosides-docusate (PERICOLACE) 8.6-50 MG per tablet Take 2 tablets by mouth 2 (Two) Times a Day. 7/11/22   Rahul Arnold APRN        Allergies:       Patient has no known allergies.    Social History:     Tobacco:    reports that he has been smoking. He has been smoking about 0.50 packs per day. He has never used smokeless tobacco.  Alcohol:      reports no history of alcohol use.  Drug Use:  reports no history of drug use.    Family History:     Family History   Problem Relation Age of Onset    Cancer Mother         liver    Heart disease Father        Review of Systems:     Review of Systems   Constitutional: Positive for malaise/fatigue. Negative for chills, decreased appetite, weight gain and weight loss.   HENT: Negative for congestion, ear discharge, hoarse voice and tinnitus.    Eyes: Negative for blurred vision, discharge, visual disturbance and visual halos.   Cardiovascular: Negative for chest pain, claudication, dyspnea on exertion, irregular heartbeat, leg swelling, orthopnea and paroxysmal nocturnal dyspnea.   Respiratory: Negative for cough, shortness of breath, sputum production and wheezing.    Endocrine: Negative for cold intolerance, heat intolerance and polyuria.   Hematologic/Lymphatic: Negative for adenopathy. Does not bruise/bleed easily.   Skin: Negative for dry skin,  "itching and suspicious lesions.   Musculoskeletal: Positive for muscle weakness. Negative for arthritis, back pain, falls, joint pain and myalgias.   Gastrointestinal: Negative for abdominal pain, constipation, diarrhea, dysphagia and hematemesis.   Genitourinary: Negative for bladder incontinence, dysuria and frequency.   Neurological: Positive for weakness. Negative for aphonia, disturbances in coordination and dizziness.   Psychiatric/Behavioral: Negative for altered mental status, depression, memory loss and substance abuse. The patient does not have insomnia and is not nervous/anxious.        Code Status:    There are no questions and answers to display.       Physical Exam:     Vitals:  Ht 177.8 cm (70\")   Wt 112 kg (247 lb 0.4 oz)   BMI 35.44 kg/m²     T 97.7 P 113 R 12 /99 Sp02 99% (room air)  Physical Exam  Vitals and nursing note reviewed.   Constitutional:       Appearance: He is well-developed.   HENT:      Head: Normocephalic and atraumatic.      Right Ear: External ear normal.      Left Ear: External ear normal.      Nose: Nose normal.      Mouth/Throat:      Mouth: Mucous membranes are dry.      Pharynx: Oropharynx is clear.   Eyes:      Pupils: Pupils are equal, round, and reactive to light.   Cardiovascular:      Rate and Rhythm: Normal rate and regular rhythm.      Heart sounds: Normal heart sounds.   Pulmonary:      Effort: Pulmonary effort is normal.      Breath sounds: Normal breath sounds.   Abdominal:      General: Bowel sounds are normal.      Palpations: Abdomen is soft.   Musculoskeletal:         General: Normal range of motion.      Cervical back: Normal range of motion and neck supple.      Comments: Generalized weakness   Skin:     General: Skin is warm and dry.   Neurological:      Mental Status: He is alert and oriented to person, place, and time.      Deep Tendon Reflexes: Reflexes are normal and symmetric.      Comments: Intermittent confusion   Psychiatric:         Behavior: " Behavior normal.               Data:     Lab Results (last 7 days)       Procedure Component Value Units Date/Time    POC Glucose Once [[id number]]  (Abnormal) Collected: 07/12/22 1127    Specimen: Blood Updated: 07/12/22 1138     Glucose 194 mg/dL      Comment: : JAK HarveyMeter ID: [id number]       POC Glucose Once [[id number]]  (Abnormal) Collected: 07/12/22 0727    Specimen: Blood Updated: 07/12/22 0740     Glucose 155 mg/dL      Comment: : NIC Ferrera ID: [id number]       Comprehensive Metabolic Panel [[id number]] Updated: 07/12/22 0501    Specimen: Blood     CBC & Differential [[id number]] Updated: 07/12/22 0501    Specimen: Blood     Narrative:      The following orders were created for panel order CBC & Differential.  Procedure                               Abnormality         Status                     ---------                               -----------         ------                     CBC Auto Differential[[id number]]                                                         Please view results for these tests on the individual orders.    CBC Auto Differential [[id number]] Updated: 07/12/22 0501    Specimen: Blood     Prealbumin [[id number]] Collected: 07/12/22 0433    Specimen: Blood Updated: 07/12/22 0449    POC Glucose Once [[id number]]  (Abnormal) Collected: 07/11/22 2134    Specimen: Blood Updated: 07/11/22 2152     Glucose 216 mg/dL      Comment: : YAMILE Mckeon ID: [id number]       POC Glucose Once [[id number]]  (Abnormal) Collected: 07/11/22 1706    Specimen: Blood Updated: 07/11/22 1718     Glucose 207 mg/dL      Comment: : JAK HarveyMeter ID: [id number]             Adult Transthoracic Echo Complete W/ Cont if Necessary Per Protocol    Result Date: 6/20/2022  Narrative: · Left ventricular ejection fraction appears to be 56 - 60%. Left ventricular systolic function is normal. · Left ventricular wall thickness is  consistent with mild concentric hypertrophy. · Normal right ventricular cavity size and systolic function noted. · There is no significant (greater than mild) valvular dysfunction.      CT Head Without Contrast    Result Date: 7/5/2022  Narrative: EXAMINATION: CT HEAD WO CONTRAST-   7/5/2022 8:13 AM CDT  HISTORY: Altered mental status, nontraumatic (Ped 0-17y); T81.40XA-Infection following a procedure, unspecified, initial encounter; Z98.890-Other specified postprocedural states  In order to have a CT radiation dose as low as reasonably achievable Automated Exposure Control was utilized for adjustment of the mA and/or KV according to patient size.  DLP in mGycm= 862  The CT scan of the head is performed without intravenous contrast enhancement. Images are acquired in axial plane with subsequent reconstruction in coronal and sagittal planes.  Comparison is made with the previous study dated 6/30/2022.  There is interval removal of the right frontal craniotomy flap since the previous study.  There is moderate diffuse edema of the underlying right frontal lobe which now bulges through the craniotomy flap which is a change since the previous study.  There is fluid accumulation between the scalp and the right frontal lobe which may represent subdural fluid accumulation?. There are some air bubbles in the area similar to the previous study. This does not correspond to the previously seen rim-enhancing fluid collection in the right frontal subgaleal region?. However there is a smaller collection located more laterally adjacent to the craniotomy which measure 2.3 x 1.4 cm. This may represent a part of the previously seen fluid accumulation.  There is a right anterior parafalcine subdural fluid accumulation measuring 1 cm. This was not seen in the previous study.  There is no evidence of intracranial hemorrhage or hematoma.  There is moderate dilatation of ventricles, basal cisterns and cortical sulci which are similar to  the previous study.  There is no midline shift.  Images reviewed in bone window show no acute bony abnormality. There is interval removal of the right frontal craniotomy flap.      Impression: 1. Removal of the right frontal the craniotomy flap. The edema of the right frontal lobe at the site of previous craniotomy and bulging of the frontal lobe through the craniotomy. 2. Right frontal subdural fluid accumulation extending medially into the right parafalcine region which was not noted on the previous study. 3. Subgaleal air and fluid accumulation in the right frontal region as detailed above.            This report was finalized on 07/05/2022 08:35 by Dr. Michelle Vigil MD.    CT Head Without Contrast    Result Date: 6/30/2022  Narrative: CT HEAD WO CONTRAST- 6/30/2022 12:57 PM CDT  HISTORY: Headache, chronic, new features or increased frequency   DOSE LENGTH PRODUCT: 688 mGy cm. Automated exposure control was also utilized to decrease patient radiation dose.  Technique: Axial CT of the brain without IV contrast. Sagittal and coronal reformations are also provided for review. Soft tissue and bone kernels are available for interpretation.  Comparison: 6/23/2022.  Findings:  Recently resected right frontal convexity meningioma. Bone flap is unremarkable. Pneumocephalus is near completely resolved with minimal air adjacent to the bone flap. There continues to be a small amount of vasogenic edema in the frontal lobe underlying the mass resection. No evidence of acute ischemia. No intra-axial or extra-axial hemorrhage. Ventricles are nondilated. No midline shift. Basal cisterns are symmetric. Soft tissue swelling identified overlying the bone flap with a small amount of postoperative air also seen along the incision. Posterior fossa structures are unremarkable. Orbital contents are unremarkable.      Impression: Impression:  1. Expected postoperative changes following recent right frontal convexity meningioma  resection. 2. No acute intracranial hemorrhage, ischemia or worsening mass effect/edema. This report was finalized on 06/30/2022 13:15 by Dr Rudy Orta, .    CT Head Without Contrast    Result Date: 6/24/2022  Narrative: EXAMINATION: CT HEAD WO CONTRAST-   6/23/2022 10:16 PM CDT  HISTORY: headache, recent meningioma removal, facial swelling  In order to have a CT radiation dose as low as reasonably achievable Automated Exposure Control was utilized for adjustment of the mA and/or KV according to patient size.  DLP in mGycm= 764  The CT scan of the head is performed without intravenous contrast enhancement.  The Images are acquired in axial plane and subsequent reconstruction in coronal and sagittal planes.  Comparison is made with the previous study dated 6/16/2022.  The Postsurgical changes and right frontal craniotomy is reidentified. There is a small, 3.7 mm, right frontoparietal extradural fluid accumulation from the previous surgery. There is a small amount of air in the subdural space which is also postsurgical.  There is a right frontal vasogenic edema with frontoparietal sulcus effacement similar to the previous study. There is evidence of a mild, 4.3 mm, shift of the left.  There is no evidence of an acute intracranial hemorrhage or hematoma.  Images are reviewed in bone window show right frontal craniotomy. No complication. There is a small right frontal subgaleal fluid and air similar to the previous study.  There are small mucous retention cyst in the ethmoid sinuses. The mastoid air cells are clear.      Impression: 1. The postsurgical changes/tumor resection in the right frontal lobe. Right frontal craniotomy with subgaleal and subdural fluid accumulation and small amount of air. 2. Right frontal vasogenic edema with mass effect and mild shift to the left. 3. Ethmoid sinusitis.  The above study was initially reviewed and reported by [facility]. I do not find any discrepancies.          This report was  finalized on 06/24/2022 06:31 by Dr. Michelle Vigil MD.    CT Head Without Contrast    Result Date: 6/16/2022  Narrative: EXAMINATION:  CT HEAD WO CONTRAST-  6/16/2022 5:31 PM CDT  HISTORY: Evaluation status post craniotomy for tumor removal; D32.9-Benign neoplasm of meninges, unspecified  TECHNIQUE: Multiple axial images were obtained through the brain without contrast infusion. Multiplanar images were reconstructed.  DLP: 2140 mGy-cm. Automated dosage reduction technique was utilized to reduce patient dosage.  COMPARISON: 3/25/2022.  FINDINGS: There has been craniotomy on the right in the frontal region. A drain extends into the subdural space. There is a small amount of hemorrhage in the subdural space. There is air in the dural space. There is residual edema in the right frontal white matter. There is mild sulcal effacement in the right frontal region. There is continued mild compression of the right lateral ventricle. No parenchymal hemorrhage is observed.      Impression: 1. Status post craniotomy on the right with resection of a tumor from the frontal dural space. A subdural drain is located in this area. There is air in the subdural space. There is minimal hemorrhage in the subdural space. 2. Vasogenic edema in the right frontal white matter remains stable. There is continued mild mass effect with sulcal effacement and mild compression of the right lateral ventricle.  This report was finalized on 06/16/2022 17:43 by Dr. Arian Stauffer MD.    MRI Brain With & Without Contrast    Result Date: 6/30/2022  Narrative: Indication: Headache, recent meningioma resection    Technique: Multisequence, multiplanar MRI of the brain with and without contrast. 20 cc MultiHance IV contrast.  Comparison: Brain MRI dated 5/26/2022, CT scan dated 6/30/2022  Findings:  Postoperative changes along the right frontal convexity following recent meningioma resection. Meningioma was completely resected. Minimal enhancement in the  underlying frontal lobe, appearing postoperative in nature. There is a pachymeningeal thickening and enhancement which is seen along the right frontal, right parietal and right occipital convexities, not uncommon in the postoperative setting. There is, however, an approximately 7.9 x 6.5 x 0.9 cm fluid collection overlying the bone flap which is greater than expected (series 9-image 113 and series 11-image 11). There appears to be developing thick enhancing rind surrounding this fluid collection, which is thought to be a pseudomeningocele. There is no diffusion restriction within this collection at this time to suggest an abscess. Ventricles are nondilated. No midline shift. Basal cisterns are symmetric. Posterior fossa structures are unremarkable.      Impression: Impression:  1. Recent right frontal convexity meningioma resection. There is an approximately 7.9 x 6.5 x 0.9 cm rim-enhancing fluid collection which is seen overlying the bone flap, suspicious for a pseudomeningocele. This is less than 1 cm deep to the skin incision and given the amount of rim enhancement which is present, it is difficult to exclude infection. I will say that this collection does not image as an abscess on diffusion, with no diffusion restriction noted. Similarly, there is no subdural empyema present. 2. Pachymeningeal enhancement is also present along the right cerebral convexity although this is not unexpected in the postoperative setting.  Findings and recommendations were discussed with CYNTHIA MORSE on 6/30/2022 at 2:57 PM CDT. This report was finalized on 06/30/2022 14:58 by Dr Rudy Orta, .    XR Chest 1 View    Result Date: 6/30/2022  Narrative: EXAM/TECHNIQUE: XR CHEST 1 VW-  INDICATION: preop; T81.40XA-Infection following a procedure, unspecified, initial encounter; Z98.890-Other specified postprocedural states  COMPARISON: 06/23/2022  FINDINGS:  Cardiomediastinal silhouette is within normal limits. No pleural  effusion or pneumothorax. No focal consolidation. No acute osseous findings.      Impression:  No acute findings. This report was finalized on 06/30/2022 21:57 by Dr. Mk Chinchilla MD.    XR Chest 1 View    Result Date: 6/24/2022  Narrative: EXAM: XR CHEST 1 VW- 6/23/2022 11:05 PM CDT  HISTORY: tachy, recent surgery   COMPARISON: May 26, 2022.  TECHNIQUE: Frontal radiograph of the chest  FINDINGS: The lungs are clear. Cardiac silhouette is normal. Vascular calcifications present in the aortic arch..  The osseous structures and surrounding soft tissues demonstrate no acute abnormality.       Impression: 1. No radiographic evidence of acute cardiopulmonary process.   This report was finalized on 06/24/2022 07:04 by Dr. Dontae Grijalva MD.    CT Angiogram Chest    Result Date: 6/24/2022  Narrative: CT ANGIOGRAM CHEST- 6/24/2022 1:05 AM CDT  HISTORY: chest pain, tachy, febrile, recent brain surgery  COMPARISON: None.  DOSE LENGTH PRODUCT: 257 mGy cm. Automated exposure control was also utilized to decrease patient radiation dose.  TECHNIQUE: Helical tomographic images of the chest were obtained after the administration of intravenous contrast following angiogram protocol. Additionally, 3D and multiplanar reformatted images were provided.    FINDINGS:  Pulmonary arteries: There is adequate enhancement of the pulmonary arteries to evaluate for central and segmental pulmonary emboli. There are no filling defects within the main, lobar, segmental or visualized subsegmental pulmonary arteries. Pulmonary arteries dilated approximately 38 mm. Most likely this is associated with pulmonary hypertension..  Aorta and great vessels: There is atherosclerosis in the aorta without aneurysm or dissection. The great vessels are normal in appearance.  Visualized neck base: The imaged portion of the base of the neck appears unremarkable.  Lungs: The lungs are clear. There is no mass, worrisome nodule, or consolidation. No pleural effusion  is seen. The trachea and bronchial tree are patent.  Heart: Cardiac silhouette is normal. Coronary calcium patient's are present.. There is no pericardial effusion.  Mediastinum and lymph nodes: No enlarged mediastinal, hilar, or axillary lymph nodes are present.  Skeletal and soft tissues: The osseous structures of the thorax and surrounding soft tissues demonstrate no acute process.  Upper abdomen: The imaged portion of the upper abdomen demonstrates no acute process.      Impression: 1. There is no evidence of embolic disease. 2. Coronary calcifications are present. 3. Prominent pulmonary artery. This may be associated with pulmonary arterial hypertension.   This report was finalized on 06/24/2022 07:53 by Dr. Dontae Grijalva MD.    Arterial Line    Result Date: 7/1/2022  Narrative: Socorro Veronica MD     7/1/2022  4:57 PM Arterial Line Pre-sedation assessment completed: 7/1/2022 4:53 PM Patient reassessed immediately prior to procedure Patient location during procedure: pre-op Start time: 7/1/2022 4:54 PM Stop Time:7/1/2022 4:56 PM  Line placed for hemodynamic monitoring and ABGs/Labs/ISTAT. Performed By Anesthesiologist: Socorro Veronica MD Preanesthetic Checklist Completed: patient identified, IV checked, site marked, risks and benefits discussed, surgical consent, monitors and equipment checked, pre-op evaluation and timeout performed Arterial Line Prep Sterile Tech: cap, gloves and mask Prep: ChloraPrep Patient monitoring: continuous pulse oximetry Arterial Line Procedure Laterality:left Location:  radial artery Catheter size: 20 G Guidance: ultrasound guided PROCEDURE NOTE/ULTRASOUND INTERPRETATION.  Using ultrasound guidance the potential vascular sites for insertion of the catheter were visualized to determine the patency of the vessel to be used for vascular access.  After selecting the appropriate site for insertion, the needle was visualized under ultrasound being inserted into the radial  artery, followed by ultrasound confirmation of wire and catheter placement. There were no abnormalities seen on ultrasound; an image was taken; and the patient tolerated the procedure with no complications. Number of attempts: 1 Successful placement: yes Post Assessment Dressing Type: secured with tape and wrist guard applied. Complications no Circ/Move/Sens Assessment: normal and unchanged. Patient Tolerance: patient tolerated the procedure well with no apparent complications     Arterial Line    Result Date: 6/16/2022  Narrative: Socorro Veronica MD     6/16/2022  7:51 AM Arterial Line Pre-sedation assessment completed: 6/16/2022 7:46 AM Patient reassessed immediately prior to procedure Patient location during procedure: pre-op Start time: 6/16/2022 7:47 AM Stop Time:6/16/2022 7:49 AM  Line placed for hemodynamic monitoring. Performed By Anesthesiologist: Socorro Veronica MD Preanesthetic Checklist Completed: patient identified, IV checked, site marked, risks and benefits discussed, surgical consent, monitors and equipment checked, pre-op evaluation and timeout performed Arterial Line Prep Sterile Tech: gloves and mask Prep: ChloraPrep Patient monitoring: continuous pulse oximetry Arterial Line Procedure Laterality:left Location:  radial artery Catheter size: 20 G Guidance: ultrasound guided PROCEDURE NOTE/ULTRASOUND INTERPRETATION.  Using ultrasound guidance the potential vascular sites for insertion of the catheter were visualized to determine the patency of the vessel to be used for vascular access.  After selecting the appropriate site for insertion, the needle was visualized under ultrasound being inserted into the radial artery, followed by ultrasound confirmation of wire and catheter placement. There were no abnormalities seen on ultrasound; an image was taken; and the patient tolerated the procedure with no complications. Number of attempts: 1 Successful placement: yes Post Assessment Dressing  Type: secured with tape and wrist guard applied. Complications no Circ/Move/Sens Assessment: normal and unchanged. Patient Tolerance: patient tolerated the procedure well with no apparent complications         Assessment:           * No active hospital problems. *    Past Medical History:   Diagnosis Date    Brain tumor (HCC)     Coronary artery disease     COVID-19 vaccine series completed     MODERNA X 3; LAST DOSE 3/2022    Diabetes (HCC)     Erectile dysfunction     GERD (gastroesophageal reflux disease)     Hypercholesteremia     Hypertension        Plan:     Serratia marcescens epidural infection  Atrial fibrillation with RVR  DM2 with hyperglycemia on long term insulin  HTN  Obesity  HLD  GERD    Continue current treatment. Monitor counts. Increase activity. Continue IV antibiotics under direction of ID. Maintain patient safety. Glycemic control. Labs Thursday. Continue rate and rhythm control.       Electronically signed by ADITYA Marino on 7/12/2022 at 11:42 CDT     Copy sent to Dylan Goodman APRN  I have discussed the care of Elijah Escobar, including pertinent history and exam findings, with the nurse practitioner.    I have seen and examined the patient and the key elements of all parts of the encounter have been performed by me.  I agree with the assessment, plan and orders as documented by ADITYA Cruz, after I modified the exam findings and the plan of treatments and the final version is my approved version of the assessment.        Electronically signed by Mono Madsen MD on 7/13/2022 at 11:46 CDT

## 2022-07-12 NOTE — PROGRESS NOTES
Malnutrition Severity Assessment    Patient Name:  Elijah Escobar  YOB: 1955  MRN: [id number]  Admit Date:  7/11/2022    Patient meets criteria for : Severe Malnutrition    Malnutrition Severity Assessment  Malnutrition Type: Acute Disease or Injury - Related Malnutrition  Malnutrition Type (last 8 hours)     Malnutrition Severity Assessment     Row Name 07/12/22 1651       Malnutrition Severity Assessment    Malnutrition Type Acute Disease or Injury - Related Malnutrition    Row Name 07/12/22 1651       Insufficient Energy Intake     Insufficient Energy Intake  < or equal to 50% of est. energy requirement for > or equal to 5d)    Row Name 07/12/22 1651       Unintentional Weight Loss     Unintentional Weight Loss Findings Severe    Unintentional Weight Loss  Weight loss of 5% in one month  19lbs in <1 month    Row Name 07/12/22 1651       Muscle Loss    Loss of Muscle Mass Findings Severe    [place] Region Moderate - slight depression    Clavicle Bone Region Moderate - some protrusion in females, visible in males    Dorsal Hand Region Moderate - slight depression    Row Name 07/12/22 1651       Fat Loss    Subcutaneous Fat Loss Findings Severe    Orbital Region  Moderate -  somewhat hollowness, slightly dark circles    Row Name 07/12/22 1651       Criteria Met (Must meet criteria for severity in at least 2 of these categories: M Wasting, Fat Loss, Fluid, Secondary Signs, Wt. Status, Intake)    Patient meets criteria for  Severe Malnutrition              Electronically signed by:  Aye Koenig MS,RDN,LD  07/12/22 16:58 CDT

## 2022-07-13 LAB
ALBUMIN SERPL-MCNC: 3.4 G/DL (ref 3.5–5.2)
ALBUMIN/GLOB SERPL: 0.9 G/DL
ALP SERPL-CCNC: 120 U/L (ref 39–117)
ALT SERPL W P-5'-P-CCNC: 10 U/L (ref 1–41)
ANION GAP SERPL CALCULATED.3IONS-SCNC: 11 MMOL/L (ref 5–15)
AST SERPL-CCNC: 10 U/L (ref 1–40)
BASOPHILS # BLD AUTO: 0.04 10*3/MM3 (ref 0–0.2)
BASOPHILS NFR BLD AUTO: 0.5 % (ref 0–1.5)
BILIRUB SERPL-MCNC: 0.3 MG/DL (ref 0–1.2)
BUN SERPL-MCNC: 6 MG/DL (ref 8–23)
BUN/CREAT SERPL: 8.3 (ref 7–25)
CALCIUM SPEC-SCNC: 8.7 MG/DL (ref 8.6–10.5)
CHLORIDE SERPL-SCNC: 105 MMOL/L (ref 98–107)
CO2 SERPL-SCNC: 24 MMOL/L (ref 22–29)
CREAT SERPL-MCNC: 0.72 MG/DL (ref 0.76–1.27)
CRP SERPL-MCNC: 1.73 MG/DL (ref 0–0.5)
DEPRECATED RDW RBC AUTO: 43.6 FL (ref 37–54)
EGFRCR SERPLBLD CKD-EPI 2021: 100.8 ML/MIN/1.73
EOSINOPHIL # BLD AUTO: 0.13 10*3/MM3 (ref 0–0.4)
EOSINOPHIL NFR BLD AUTO: 1.6 % (ref 0.3–6.2)
ERYTHROCYTE [DISTWIDTH] IN BLOOD BY AUTOMATED COUNT: 12.7 % (ref 12.3–15.4)
ERYTHROCYTE [SEDIMENTATION RATE] IN BLOOD: 47 MM/HR (ref 0–20)
GLOBULIN UR ELPH-MCNC: 3.9 GM/DL
GLUCOSE BLDC GLUCOMTR-MCNC: 140 MG/DL (ref 70–130)
GLUCOSE BLDC GLUCOMTR-MCNC: 238 MG/DL (ref 70–130)
GLUCOSE BLDC GLUCOMTR-MCNC: 272 MG/DL (ref 70–130)
GLUCOSE SERPL-MCNC: 155 MG/DL (ref 65–99)
HCT VFR BLD AUTO: 33 % (ref 37.5–51)
HGB BLD-MCNC: 10.8 G/DL (ref 13–17.7)
IMM GRANULOCYTES # BLD AUTO: 0.05 10*3/MM3 (ref 0–0.05)
IMM GRANULOCYTES NFR BLD AUTO: 0.6 % (ref 0–0.5)
LYMPHOCYTES # BLD AUTO: 2.61 10*3/MM3 (ref 0.7–3.1)
LYMPHOCYTES NFR BLD AUTO: 32 % (ref 19.6–45.3)
MCH RBC QN AUTO: 30.9 PG (ref 26.6–33)
MCHC RBC AUTO-ENTMCNC: 32.7 G/DL (ref 31.5–35.7)
MCV RBC AUTO: 94.3 FL (ref 79–97)
MONOCYTES # BLD AUTO: 0.79 10*3/MM3 (ref 0.1–0.9)
MONOCYTES NFR BLD AUTO: 9.7 % (ref 5–12)
NEUTROPHILS NFR BLD AUTO: 4.54 10*3/MM3 (ref 1.7–7)
NEUTROPHILS NFR BLD AUTO: 55.6 % (ref 42.7–76)
NRBC BLD AUTO-RTO: 0 /100 WBC (ref 0–0.2)
PLATELET # BLD AUTO: 322 10*3/MM3 (ref 140–450)
PMV BLD AUTO: 9.2 FL (ref 6–12)
POTASSIUM SERPL-SCNC: 3.4 MMOL/L (ref 3.5–5.2)
PROT SERPL-MCNC: 7.3 G/DL (ref 6–8.5)
RBC # BLD AUTO: 3.5 10*6/MM3 (ref 4.14–5.8)
SODIUM SERPL-SCNC: 140 MMOL/L (ref 136–145)
WBC NRBC COR # BLD: 8.16 10*3/MM3 (ref 3.4–10.8)

## 2022-07-13 PROCEDURE — 97530 THERAPEUTIC ACTIVITIES: CPT

## 2022-07-13 PROCEDURE — 97116 GAIT TRAINING THERAPY: CPT

## 2022-07-13 PROCEDURE — 82962 GLUCOSE BLOOD TEST: CPT

## 2022-07-13 PROCEDURE — 25010000002 CEFEPIME PER 500 MG: Performed by: INTERNAL MEDICINE

## 2022-07-13 PROCEDURE — 80053 COMPREHEN METABOLIC PANEL: CPT | Performed by: INTERNAL MEDICINE

## 2022-07-13 PROCEDURE — 63710000001 INSULIN LISPRO (HUMAN) PER 5 UNITS: Performed by: INTERNAL MEDICINE

## 2022-07-13 PROCEDURE — 85025 COMPLETE CBC W/AUTO DIFF WBC: CPT | Performed by: INTERNAL MEDICINE

## 2022-07-13 PROCEDURE — 85652 RBC SED RATE AUTOMATED: CPT | Performed by: NURSE PRACTITIONER

## 2022-07-13 PROCEDURE — 86140 C-REACTIVE PROTEIN: CPT | Performed by: NURSE PRACTITIONER

## 2022-07-13 PROCEDURE — 63710000001 INSULIN DETEMIR PER 5 UNITS: Performed by: INTERNAL MEDICINE

## 2022-07-13 RX ORDER — DILTIAZEM HCL IN NACL,ISO-OSM 125 MG/125
5-15 PLASTIC BAG, INJECTION (ML) INTRAVENOUS
Status: DISCONTINUED | OUTPATIENT
Start: 2022-07-13 | End: 2022-07-25 | Stop reason: HOSPADM

## 2022-07-13 RX ORDER — POTASSIUM CHLORIDE 750 MG/1
20 CAPSULE, EXTENDED RELEASE ORAL
Status: DISPENSED | OUTPATIENT
Start: 2022-07-13 | End: 2022-07-13

## 2022-07-13 RX ORDER — NICOTINE 21 MG/24HR
1 PATCH, TRANSDERMAL 24 HOURS TRANSDERMAL
Status: DISCONTINUED | OUTPATIENT
Start: 2022-07-13 | End: 2022-07-25 | Stop reason: HOSPADM

## 2022-07-13 NOTE — CONSULTS
INFECTIOUS DISEASES CONSULT NOTE    Patient:  Elijah Escobar 66 y.o. male  ROOM # 589/1  YOB: 1955  MRN: [id number]  [facility]:  [id number]  Admit date: 7/11/2022   Admitting Physician: Mono Madsen MD  Primary Care Physician: Dylan Lama APRN  REFERRING PROVIDER: Mono Madsen, *    Reason for Consultation: Subgaleal/epidural infection with Serratia    History of Present Illness/Chief Complaint: Pleasant 66-year-old man.  I was following him on acute care at [facility].  He had undergone surgery mid June for a meningioma.  He developed a subgaleal and epidural abscess with Serratia.  He underwent operative exploration, irrigation, and drainage on July 1, 2022.  He has been receiving intravenous antibiotic treatment with cefepime.  Plan was for a minimum of 3 weeks of intravenous antibiotics post surgical drainage.  He has been transferred to [facility] for further IV antibiotic treatment and physical therapy.  He is comfortable at present.  He is without fevers or chills.  No diarrhea and no rash.  No cardiopulmonary symptoms.  Infectious disease asked to evaluate and assist with antimicrobial management.  I saw and examined him in the morning of July 12, 2022.  This note was initiated and completed as a late entry on July 13, 2022.    Current Scheduled Medications:   aspirin, 81 mg, Oral, Daily  atorvastatin, 20 mg, Oral, Nightly  cefepime, 2 g, Intravenous, Q8H  dilTIAZem CD, 120 mg, Oral, Q24H  famotidine, 40 mg, Oral, Daily  gabapentin, 300 mg, Oral, TID  HYDROcodone-acetaminophen, 1 tablet, Oral, 4x Daily  insulin detemir, 30 Units, Subcutaneous, Nightly  insulin lispro, 0-14 Units, Subcutaneous, 4x Daily AC & at Bedtime  isosorbide mononitrate, 60 mg, Oral, Q24H  levETIRAcetam, 500 mg, Oral, BID  lisinopril, 40 mg, Oral, Daily  metoprolol tartrate, 50 mg, Oral, Q12H  nicotine, 1 patch, Transdermal, Q24H  potassium chloride, 20 mEq, Oral, Once  saccharomyces  "boulardii, 250 mg, Oral, BID  senna-docusate sodium, 2 tablet, Oral, Daily  sodium chloride, 10 mL, Intravenous, Q12H      Current PRN Medications:  •  acetaminophen **OR** acetaminophen  •  senna-docusate sodium **AND** polyethylene glycol **AND** bisacodyl **AND** bisacodyl  •  dextrose  •  dextrose  •  glucagon (human recombinant)  •  labetalol  •  ondansetron **OR** ondansetron  •  sodium chloride    Allergies:  No Known Allergies     Past Medical History: History of craniotomy for grade 1 meningioma.  Orthostatic dizziness.  Concern for postoperative edema versus pseudomeningocele versus postoperative infection.  Headache.  Gastroesophageal reflux disease.  Hypercholesterolemia.  Hypertension.  Diabetes mellitus.  Erectile dysfunction.  Coronary artery disease.     Past Surgical History:  Craniotomy on July 1, 2022 for drainage of epidural/subgaleal abscess.  Previous craniotomy on June 16, 2022 for right-sided meningioma.  Previous angioplasty.     Social History: Smokes 1/2 pack/day of cigarettes.  No alcohol or illicit drug use.  .  Lives in [facility].  Retired.  Previously worked for the [employer].     Family History: Mother history of cancer.  Father history of heart disease.     Exposure History: No close contacts have been ill.  He has had 2 COVID-19 vaccinations and COVID-19 booster.    Review of Systems see HPI    Vital Signs:  Ht 177.8 cm (70\")   Wt 112 kg (247 lb 0.4 oz)   BMI 35.44 kg/m²  No data recorded.    Physical Exam  Vital signs - reviewed.  Line/IV site - No erythema or tenderness.  Alert, pleasant, no distress  Lungs clear to auscultation without crackles or wheezes  Heart regular rhythm without murmur  Abdomen is soft and nontender  Craniotomy site without warmth or discoloration.  Surgical incision well-healed without induration or drainage    Lab Results:  CBC: Results from last 7 days   Lab Units 07/13/22  0502 07/10/22  0219   WBC 10*3/mm3 8.16 6.96 "   HEMOGLOBIN g/dL 10.8* 9.6*   HEMATOCRIT % 33.0* 30.3*   PLATELETS 10*3/mm3 322 259     CMP:   Results from last 7 days   Lab Units 07/13/22  0502 07/10/22  0219   SODIUM mmol/L 140 138   POTASSIUM mmol/L 3.4* 3.5   CHLORIDE mmol/L 105 105   CO2 mmol/L 24.0 22.0   BUN mg/dL 6* 6*   CREATININE mg/dL 0.72* 0.67*   CALCIUM mg/dL 8.7 8.2*   BILIRUBIN mg/dL 0.3 0.3   ALK PHOS U/L 120* 100   ALT (SGPT) U/L 10 9   AST (SGOT) U/L 10 9   GLUCOSE mg/dL 155* 147*     Radiology:     CT head July 5, 2022:  IMPRESSION:  1. Removal of the right frontal the craniotomy flap. The edema of the  right frontal lobe at the site of previous craniotomy and bulging of the  frontal lobe through the craniotomy.  2. Right frontal subdural fluid accumulation extending medially into the  right parafalcine region which was not noted on the previous study.  3. Subgaleal air and fluid accumulation in the right frontal region as  detailed above.  This report was finalized on 07/05/2022 08:35 by Dr. Michelle Vigil MD.    Additional Studies Reviewed:     Impression:   1.  Subgaleal/epidural infection with Serratia-improving  2.  Acute kidney injury-resolved  3.  Diabetes mellitus  4.  Coronary artery disease    Recommendations:    Continue treatment cefepime  Physical therapy  Tentative stop date for cefepime is July 22, 2022 (that would be 3 weeks following subgaleal/epidural abscess drainage).  Continue to follow    Edwin Jeffers MD  07/13/22  14:51 CDT

## 2022-07-13 NOTE — PROGRESS NOTES
FARSHAD Salamanca APRN        Internal Medicine Progress Note    7/13/2022   09:53 CDT    Name:  Elijah Escobar  MRN:    [id number]     Acct:     [id number]   Room:  [address] [place] [address] Day: 0     Admit Date: 7/11/2022  3:17 PM  PCP: Dylan Lama APRN    Subjective:     C/C: weakness, confusion    Interval History: Status:  Improved. Up to chair with therapy. Helmet in place. No family at bedside. Afebrile. Progressing with therapies. Alert and oriented at present time. Denies pain. Potassium 3.4. Counts otherwise stable.  Requesting nicotine patch.     Review of Systems   Constitutional: Positive for malaise/fatigue. Negative for chills, decreased appetite, weight gain and weight loss.   HENT: Negative for congestion, ear discharge, hoarse voice and tinnitus.    Eyes: Negative for blurred vision, discharge, visual disturbance and visual halos.   Cardiovascular: Negative for chest pain, claudication, dyspnea on exertion, irregular heartbeat, leg swelling, orthopnea and paroxysmal nocturnal dyspnea.   Respiratory: Negative for cough, shortness of breath, sputum production and wheezing.    Endocrine: Negative for cold intolerance, heat intolerance and polyuria.   Hematologic/Lymphatic: Negative for adenopathy. Does not bruise/bleed easily.   Skin: Negative for dry skin, itching and suspicious lesions.   Musculoskeletal: Negative for arthritis, back pain, falls, joint pain, muscle weakness and myalgias.   Gastrointestinal: Negative for abdominal pain, constipation, diarrhea, dysphagia and hematemesis.   Genitourinary: Negative for bladder incontinence, dysuria and frequency.   Neurological: Positive for headaches and weakness. Negative for aphonia, disturbances in coordination and dizziness.   Psychiatric/Behavioral: Negative for altered mental status, depression, memory loss and substance abuse. The patient does not have insomnia and is not nervous/anxious.          Medications:      Allergies: No Known Allergies    Current Meds:   Current Facility-Administered Medications:   •  acetaminophen (TYLENOL) tablet 650 mg, 650 mg, Oral, Q4H PRN **OR** acetaminophen (TYLENOL) suppository 650 mg, 650 mg, Rectal, Q4H PRN, Mono Madsen MD  •  aspirin chewable tablet 81 mg, 81 mg, Oral, Daily, Mono Madsen MD  •  atorvastatin (LIPITOR) tablet 20 mg, 20 mg, Oral, Nightly, Mono Madsen MD  •  sennosides-docusate (PERICOLACE) 8.6-50 MG per tablet 2 tablet, 2 tablet, Oral, Daily **AND** polyethylene glycol (MIRALAX) packet 17 g, 17 g, Oral, Daily PRN **AND** bisacodyl (DULCOLAX) EC tablet 5 mg, 5 mg, Oral, Daily PRN **AND** bisacodyl (DULCOLAX) suppository 10 mg, 10 mg, Rectal, Daily PRN, Mono Madsen MD  •  cefepime (MAXIPIME) 2 g/100 mL 0.9% NS (mbp), 2 g, Intravenous, Q8H, Mono Madsen MD  •  dextrose (D50W) (25 g/50 mL) IV injection 25 g, 25 g, Intravenous, Q15 Min PRN, Mono Madsen MD  •  dextrose (GLUTOSE) oral gel 15 g, 15 g, Oral, Q15 Min PRN, Mono Madsen MD  •  dilTIAZem CD (CARDIZEM CD) 24 hr capsule 120 mg, 120 mg, Oral, Q24H, Mono Madsen MD  •  famotidine (PEPCID) tablet 40 mg, 40 mg, Oral, Daily, Mono Madsen MD  •  gabapentin (NEURONTIN) capsule 300 mg, 300 mg, Oral, TID, Mono Madsen MD  •  glucagon (human recombinant) (GLUCAGEN DIAGNOSTIC) injection 1 mg, 1 mg, Intramuscular, Q15 Min PRN, Mono Madsen MD  •  HYDROcodone-acetaminophen (NORCO)  MG per tablet 1 tablet, 1 tablet, Oral, 4x Daily, Mono Madsen MD  •  insulin detemir (LEVEMIR) injection 30 Units, 30 Units, Subcutaneous, Nightly, Mono Madsen MD  •  Insulin Lispro (humaLOG) injection 0-14 Units, 0-14 Units, Subcutaneous, 4x Daily AC & at Bedtime, Mono Madsen MD  •  isosorbide mononitrate (IMDUR) 24 hr tablet 60 mg, 60 mg, Oral, Q24H, Mono Madsen MD  •  labetalol  "(NORMODYNE,TRANDATE) injection 10 mg, 10 mg, Intravenous, Q6H PRN, Mono Madsen MD  •  levETIRAcetam (KEPPRA) tablet 500 mg, 500 mg, Oral, BID, Mono Madsen MD  •  lisinopril (PRINIVIL,ZESTRIL) tablet 40 mg, 40 mg, Oral, Daily, Mono Madsen MD  •  metoprolol tartrate (LOPRESSOR) tablet 50 mg, 50 mg, Oral, Q12H, Mono Madsen MD  •  nicotine (NICODERM CQ) 14 MG/24HR patch 1 patch, 1 patch, Transdermal, Daily PRN, Mono Madsen MD  •  ondansetron (ZOFRAN) tablet 4 mg, 4 mg, Oral, Q6H PRN **OR** ondansetron (ZOFRAN) injection 4 mg, 4 mg, Intravenous, Q6H PRN, Mono Madesn MD  •  saccharomyces boulardii (FLORASTOR) capsule 250 mg, 250 mg, Oral, BID, Mono Madsen MD  •  sodium chloride 0.9 % flush 10 mL, 10 mL, Intravenous, Q12H, Mono Madsen MD  •  sodium chloride 0.9 % flush 10 mL, 10 mL, Intravenous, PRN, Mono Madsen MD    Data:     Code Status:    There are no questions and answers to display.       Family History   Problem Relation Age of Onset   • Cancer Mother         liver   • Heart disease Father        Social History     Socioeconomic History   • Marital status:    Tobacco Use   • Smoking status: Current Every Day Smoker     Packs/day: 0.50   • Smokeless tobacco: Never Used   Vaping Use   • Vaping Use: Never used   Substance and Sexual Activity   • Alcohol use: Never   • Drug use: Never   • Sexual activity: Defer       Vitals:  Ht 177.8 cm (70\")   Wt 112 kg (247 lb 0.4 oz)   BMI 35.44 kg/m²   T 98.3 P 96 R 15 /75 Sp02 95% (room air)          I/O (24Hr):  No intake or output data in the 24 hours ending 07/13/22 0953    Labs and imaging:      Recent Results (from the past 12 hour(s))   POC Glucose Once    Collection Time: 07/12/22 10:37 PM    Specimen: Blood   Result Value Ref Range    Glucose 159 (H) 70 - 130 mg/dL   Comprehensive Metabolic Panel    Collection Time: 07/13/22  5:02 AM    Specimen: Blood "   Result Value Ref Range    Glucose 155 (H) 65 - 99 mg/dL    BUN 6 (L) 8 - 23 mg/dL    Creatinine 0.72 (L) 0.76 - 1.27 mg/dL    Sodium 140 136 - 145 mmol/L    Potassium 3.4 (L) 3.5 - 5.2 mmol/L    Chloride 105 98 - 107 mmol/L    CO2 24.0 22.0 - 29.0 mmol/L    Calcium 8.7 8.6 - 10.5 mg/dL    Total Protein 7.3 6.0 - 8.5 g/dL    Albumin 3.40 (L) 3.50 - 5.20 g/dL    ALT (SGPT) 10 1 - 41 U/L    AST (SGOT) 10 1 - 40 U/L    Alkaline Phosphatase 120 (H) 39 - 117 U/L    Total Bilirubin 0.3 0.0 - 1.2 mg/dL    Globulin 3.9 gm/dL    A/G Ratio 0.9 g/dL    BUN/Creatinine Ratio 8.3 7.0 - 25.0    Anion Gap 11.0 5.0 - 15.0 mmol/L    eGFR 100.8 >60.0 mL/min/1.73   CBC Auto Differential    Collection Time: 07/13/22  5:02 AM    Specimen: Blood   Result Value Ref Range    WBC 8.16 3.40 - 10.80 10*3/mm3    RBC 3.50 (L) 4.14 - 5.80 10*6/mm3    Hemoglobin 10.8 (L) 13.0 - 17.7 g/dL    Hematocrit 33.0 (L) 37.5 - 51.0 %    MCV 94.3 79.0 - 97.0 fL    MCH 30.9 26.6 - 33.0 pg    MCHC 32.7 31.5 - 35.7 g/dL    RDW 12.7 12.3 - 15.4 %    RDW-SD 43.6 37.0 - 54.0 fl    MPV 9.2 6.0 - 12.0 fL    Platelets 322 140 - 450 10*3/mm3    Neutrophil % 55.6 42.7 - 76.0 %    Lymphocyte % 32.0 19.6 - 45.3 %    Monocyte % 9.7 5.0 - 12.0 %    Eosinophil % 1.6 0.3 - 6.2 %    Basophil % 0.5 0.0 - 1.5 %    Immature Grans % 0.6 (H) 0.0 - 0.5 %    Neutrophils, Absolute 4.54 1.70 - 7.00 10*3/mm3    Lymphocytes, Absolute 2.61 0.70 - 3.10 10*3/mm3    Monocytes, Absolute 0.79 0.10 - 0.90 10*3/mm3    Eosinophils, Absolute 0.13 0.00 - 0.40 10*3/mm3    Basophils, Absolute 0.04 0.00 - 0.20 10*3/mm3    Immature Grans, Absolute 0.05 0.00 - 0.05 10*3/mm3    nRBC 0.0 0.0 - 0.2 /100 WBC   C-reactive Protein    Collection Time: 07/13/22  5:02 AM    Specimen: Blood   Result Value Ref Range    C-Reactive Protein 1.73 (H) 0.00 - 0.50 mg/dL   Sedimentation Rate    Collection Time: 07/13/22  5:02 AM    Specimen: Blood   Result Value Ref Range    Sed Rate 47 (H) 0 - 20 mm/hr                                Physical Examination:        Physical Exam  Vitals and nursing note reviewed.   Constitutional:       Appearance: He is well-developed.   HENT:      Head: Normocephalic and atraumatic.      Right Ear: External ear normal.      Left Ear: External ear normal.      Nose: Nose normal.      Mouth/Throat:      Mouth: Mucous membranes are dry.      Pharynx: Oropharynx is clear.   Eyes:      Pupils: Pupils are equal, round, and reactive to light.   Cardiovascular:      Rate and Rhythm: Normal rate and regular rhythm.      Heart sounds: Normal heart sounds.   Pulmonary:      Effort: Pulmonary effort is normal.      Breath sounds: Normal breath sounds.   Abdominal:      General: Bowel sounds are normal.      Palpations: Abdomen is soft.      Comments: obese   Musculoskeletal:         General: Normal range of motion.      Cervical back: Normal range of motion and neck supple.      Comments: Generalized weakness   Skin:     General: Skin is warm and dry.   Neurological:      Mental Status: He is alert and oriented to person, place, and time.      Deep Tendon Reflexes: Reflexes are normal and symmetric.      Comments: Intermittent confusion   Psychiatric:         Behavior: Behavior normal.           Assessment:           * No active hospital problems. *    Past Medical History:   Diagnosis Date   • Brain tumor (HCC)    • Coronary artery disease    • COVID-19 vaccine series completed     MODERNA X 3; LAST DOSE 3/2022   • Diabetes (HCC)    • Erectile dysfunction    • GERD (gastroesophageal reflux disease)    • Hypercholesteremia    • Hypertension         Plan:        1. Serratia marcescens epidural infection  2. Atrial fibrillation with RVR  3. DM2 with hyperglycemia on long term insulin  4. HTN  5. Obesity  6. HLD  7. GERD  8. Hypokalemia    Continue current treatment. Monitor counts. Increase activity. Labs in am. Replace and recheck potassium. Aggressive therapies as tolerated. Maintain patient  safety. Continue IV antibiotics under direction of ID. Glycemic control. Continue tobacco cessation efforts and nicotine replacement.         Electronically signed by ADITYA Marino on 7/13/2022 at 09:53 CDT     I have discussed the care of Elijah Escobar, including pertinent history and exam findings, with the nurse practitioner.    I have seen and examined the patient and the key elements of all parts of the encounter have been performed by me.  I agree with the assessment, plan and orders as documented by ADITYA Cruz, after I modified the exam findings and the plan of treatments and the final version is my approved version of the assessment.        Electronically signed by Mono Madsen MD on 7/13/2022 at 21:54 CDT

## 2022-07-13 NOTE — CONSULTS
NEUROSURGERY INITIAL HOSPITAL ENCOUNTER    Assessment/Plan:   Elijah Escobar is a 66 y.o. male with a significant medical history of recent craniotomy for tumor (6/16/2022), incision and washout and craniectomy (7/1/2022), coronary artery disease, A. fib, diabetes, erectile dysfunction, GERD, hypertension, hyperlipidemia.  He presents with a new problem of a persistent right sided headache, particularly over the right temporal region and dizziness with standing.  Physical exam findings of tense but fluctuant area of edema from the mid frontal to right parietal region of the scalp, the area is tender to palpation, periorbital lesions with purulent drainage from the left eye, scalp incision is clean, dry, and intact, bright and awake, oriented x3, speech is clear, follows commands without prompting showing thumbs up and 2 fingers bilaterally, no pronator drift, left dysmetria, gross hyporeflexia, and bilateral Babinski.  CT of the head from 6/23/2022, showing expected postsurgical changes to the right frontal lobe, small amount of fluid collection and pneumocephalus in the right subdural space, small amount of vasogenic edema with 4.3 mm left-to-right midline shift.  Repeat CT of the head obtained 6/30/2022 shows no acute intracranial blood products, unresolved vasogenic edema, midline shift resolved, increased left frontal and temporal soft tissue swelling.  MRI of the brain from 6/30/2022 shows a 7.9 x 6.5 x 0.9 cm rim-enhancing fluid collection overlying the bone flap concerning for pseudomeningocele versus infection.    Differential Diagnosis:   Status post craniotomy for meningioma, WHO grade 1 (6/16/2022)  Status post incision and washout and craniectomy for postoperative wound infection (7/1/2022)  A. fib with RVR  Hyperglycemia    Recommendations:  · Transfer to LTAC  · Continue antibiotics per ID  · Weekly CRP and ESR  · SSI for hyperglycemia.  Carb restricted diet for tight glucose control  · Neurochecks  per policy.  Call for decline  · PT/OT.  · Cranial helmet on at all times while out of bed  · A. fib.  Treatment recommendations per cardiology.    I discussed the patients findings and my recommendations with patient    Thank you very much for this interesting consult.   ___________________________________________________________________    Reason for consult: Craniotomy for tumor (6/16/2022) and incision and washout and craniectomy (7/1/2022)    Consultation at the request of Mono Madsen MD.    Chief Complaint: Postoperative wound infection    HPI: Elijah Escobar is a 66 y.o. male with a significant medical history of recent craniotomy for tumor, 6/16/2022, coronary artery disease, A. fib, diabetes, erectile dysfunction, GERD, hypertension, hyperlipidemia. He presents with a new problem of a persistent right sided headache, particularly over the right temporal region and dizziness with standing.  Physical exam findings of tense but fluctuant area of edema from the mid frontal to right parietal region of the scalp, the area is tender to palpation, periorbital lesions with purulent drainage from the left eye, scalp incision is clean, dry, and intact, bright and awake, oriented x3, speech is clear, follows commands without prompting showing thumbs up and 2 fingers bilaterally, no pronator drift, left dysmetria, gross hyporeflexia, and bilateral Babinski.  CT of the head from 6/23/2022, showing expected postsurgical changes to the right frontal lobe, small amount of fluid collection and pneumocephalus in the right subdural space, small amount of vasogenic edema with 4.3 mm left-to-right midline shift.  Repeat CT of the head obtained 6/30/2022 shows no acute intracranial blood products, unresolved vasogenic edema, midline shift resolved, increased left frontal and temporal soft tissue swelling.  MRI of the brain from 6/30/2022 shows a 7.9 x 6.5 x 0.9 cm rim-enhancing fluid collection overlying the bone flap  concerning for pseudomeningocele versus infection.     On 6/16/2022 the patient was brought to the main operating room where he underwent an uneventful craniotomy for tumor removal.  Postoperatively he  did extremely well and his neurological exam remained unchanged.  He was kept in the hospital for close neurologic monitoring, airway observation, and for pain control.  His JUAN drain has been removed.  He has been able to ambulate, tolerate oral diet, oral pain medications and void.     Prior to discharge Mr. Escobar experience an episode of A. fib with RVR.  He was evaluated by both hospital services and cardiology.  He was initially placed on a Cardizem drip and carvedilol was transitioned back to sotalol.  An echocardiogram was obtained and he was found to have a left ventricular ejection fraction of 55-60% with normal left ventricular systolic function.  His pulse rate normalized and he was deemed safe for discharge home by cardiology.       Mr. Escobar return to [facility] on 6/24/2022 with A. fib with RVR and was subsequently found to have a urinary tract infection.  He has since been discharged with reports of well-controlled heart rate and blood pressure.     He returned to [facility] ED on 6/30/2022 with complaints of persistent right-sided headache and dizziness with standing. He additionally reports 1 fall from standing height while packing groceries.  He denies hitting his head or loss of consciousness.  States he completed full course of steroid taper and is taking Keppra as prescribed.  He additionally denies fevers, chills, seizure-like activity, visual disturbances, difficulty with words or word finding, facial asymmetry or dysesthesias, unilateral weakness, nausea or vomiting.     On 7/1/2022 the patient was brought to the main operating room where he underwent an uneventful incision/drainage and washout and craniectomy.  Postoperatively he has done well and his neurological exam  improved.  He has been kept in the hospital for close neurologic monitoring, airway observation, and for pain control.  He has been able to ambulate, tolerate oral diet, oral pain medications and void. He has been evaluated by infectious disease with recommendations as follows: Continue cefepime with tentative stop date 7/22/2022.  A single-lumen PICC line was placed to the right upper extremity.  He has also been evaluated by cardiology for continuing management of A. fib, as well as physical therapy and Occupational Therapy and [facility]  He has been fitted for cranial helmet which should remain at bedside and use at all times while out of bed.     Review of Systems   Constitutional: Negative.  Negative for chills and fever.   HENT: Negative.    Eyes: Negative.  Negative for visual disturbance.   Respiratory: Negative.    Cardiovascular: Negative.    Gastrointestinal: Negative.  Negative for nausea and vomiting.   Endocrine: Negative.    Genitourinary: Negative.    Musculoskeletal: Negative.    Skin: Positive for wound (healing).   Allergic/Immunologic: Negative.    Neurological: Negative.  Negative for dizziness, tremors, seizures, syncope, facial asymmetry, speech difficulty, weakness, light-headedness, numbness and headaches.   Hematological: Negative.    Psychiatric/Behavioral: Negative.  Negative for confusion.   All other systems reviewed and are negative.     Past Medical History:  has a past medical history of Brain tumor (HCC), Coronary artery disease, COVID-19 vaccine series completed, Diabetes (HCC), Erectile dysfunction, GERD (gastroesophageal reflux disease), Hypercholesteremia, and Hypertension.    Past Surgical History:  has a past surgical history that includes Colonoscopy; Balloon angioplasty, artery (Right); Craniotomy for Tumor (Right, 6/16/2022); and Craniectomy (Right, 7/1/2022).    Family History: family history includes Cancer in his  mother; Heart disease in his father.    Social History:  reports that he has been smoking. He has been smoking about 0.50 packs per day. He has never used smokeless tobacco. He reports that he does not drink alcohol and does not use drugs.    Allergies: Patient has no known allergies.    Home Medications:   Current Facility-Administered Medications:   •  acetaminophen (TYLENOL) tablet 650 mg, 650 mg, Oral, Q4H PRN **OR** acetaminophen (TYLENOL) suppository 650 mg, 650 mg, Rectal, Q4H PRN, Mono Madsen MD  •  aspirin chewable tablet 81 mg, 81 mg, Oral, Daily, Mono Madsen MD  •  atorvastatin (LIPITOR) tablet 20 mg, 20 mg, Oral, Nightly, Mono Madsen MD  •  sennosides-docusate (PERICOLACE) 8.6-50 MG per tablet 2 tablet, 2 tablet, Oral, Daily **AND** polyethylene glycol (MIRALAX) packet 17 g, 17 g, Oral, Daily PRN **AND** bisacodyl (DULCOLAX) EC tablet 5 mg, 5 mg, Oral, Daily PRN **AND** bisacodyl (DULCOLAX) suppository 10 mg, 10 mg, Rectal, Daily PRN, Mono Madsen MD  •  cefepime (MAXIPIME) 2 g/100 mL 0.9% NS (mbp), 2 g, Intravenous, Q8H, Mono Madsen MD  •  dextrose (D50W) (25 g/50 mL) IV injection 25 g, 25 g, Intravenous, Q15 Min PRN, Mono Madsen MD  •  dextrose (GLUTOSE) oral gel 15 g, 15 g, Oral, Q15 Min PRN, Mono Madsen MD  •  dilTIAZem CD (CARDIZEM CD) 24 hr capsule 120 mg, 120 mg, Oral, Q24H, Mono Madsen MD  •  famotidine (PEPCID) tablet 40 mg, 40 mg, Oral, Daily, Mono Madsen MD  •  gabapentin (NEURONTIN) capsule 300 mg, 300 mg, Oral, TID, Mono Madsen MD  •  glucagon (human recombinant) (GLUCAGEN DIAGNOSTIC) injection 1 mg, 1 mg, Intramuscular, Q15 Min PRN, Mono Madsen MD  •  HYDROcodone-acetaminophen (NORCO)  MG per tablet 1 tablet, 1 tablet, Oral, 4x Daily, Mono Madsen MD  •  insulin detemir (LEVEMIR) injection 30 Units, 30 Units, Subcutaneous, Nightly, Mono Madsen,  MD  •  Insulin Lispro (humaLOG) injection 0-14 Units, 0-14 Units, Subcutaneous, 4x Daily AC & at Bedtime, Mono Madsen MD  •  isosorbide mononitrate (IMDUR) 24 hr tablet 60 mg, 60 mg, Oral, Q24H, Mono Madsen MD  •  labetalol (NORMODYNE,TRANDATE) injection 10 mg, 10 mg, Intravenous, Q6H PRN, Mono Madsen MD  •  levETIRAcetam (KEPPRA) tablet 500 mg, 500 mg, Oral, BID, Mono Madsen MD  •  lisinopril (PRINIVIL,ZESTRIL) tablet 40 mg, 40 mg, Oral, Daily, Mono Madsen MD  •  metoprolol tartrate (LOPRESSOR) tablet 50 mg, 50 mg, Oral, Q12H, Mono Madsen MD  •  nicotine (NICODERM CQ) 14 MG/24HR patch 1 patch, 1 patch, Transdermal, Daily PRN, Mono Madsen MD  •  ondansetron (ZOFRAN) tablet 4 mg, 4 mg, Oral, Q6H PRN **OR** ondansetron (ZOFRAN) injection 4 mg, 4 mg, Intravenous, Q6H PRN, Mono Madsen MD  •  saccharomyces boulardii (FLORASTOR) capsule 250 mg, 250 mg, Oral, BID, Mono Madsen MD  •  sodium chloride 0.9 % flush 10 mL, 10 mL, Intravenous, Q12H, Mono Madsen MD  •  sodium chloride 0.9 % flush 10 mL, 10 mL, Intravenous, PRN, Mono Madsen MD    Medications: Scheduled Meds:aspirin, 81 mg, Oral, Daily  atorvastatin, 20 mg, Oral, Nightly  cefepime, 2 g, Intravenous, Q8H  dilTIAZem CD, 120 mg, Oral, Q24H  famotidine, 40 mg, Oral, Daily  gabapentin, 300 mg, Oral, TID  HYDROcodone-acetaminophen, 1 tablet, Oral, 4x Daily  insulin detemir, 30 Units, Subcutaneous, Nightly  insulin lispro, 0-14 Units, Subcutaneous, 4x Daily AC & at Bedtime  isosorbide mononitrate, 60 mg, Oral, Q24H  levETIRAcetam, 500 mg, Oral, BID  lisinopril, 40 mg, Oral, Daily  metoprolol tartrate, 50 mg, Oral, Q12H  saccharomyces boulardii, 250 mg, Oral, BID  senna-docusate sodium, 2 tablet, Oral, Daily  sodium chloride, 10 mL, Intravenous, Q12H      Continuous Infusions:   PRN Meds:.•  acetaminophen **OR** acetaminophen  •  senna-docusate sodium  **AND** polyethylene glycol **AND** bisacodyl **AND** bisacodyl  •  dextrose  •  dextrose  •  glucagon (human recombinant)  •  labetalol  •  nicotine  •  ondansetron **OR** ondansetron  •  sodium chloride    Vital Signs       Physical Exam  Physical Exam  Vitals and nursing note reviewed.   Constitutional:       General: He is not in acute distress.     Appearance: Normal appearance. He is well-developed and well-groomed. He is obese. He is not ill-appearing, toxic-appearing or diaphoretic.      Comments: BMI 35.44   HENT:      Head: Normocephalic and atraumatic.        Right Ear: Hearing normal.      Left Ear: Hearing normal.   Eyes:      Extraocular Movements: EOM normal.      Conjunctiva/sclera: Conjunctivae normal.      Pupils: Pupils are equal, round, and reactive to light.   Neck:      Trachea: Trachea normal.   Cardiovascular:      Rate and Rhythm: Normal rate and regular rhythm.   Pulmonary:      Effort: Pulmonary effort is normal. No tachypnea, bradypnea, accessory muscle usage or respiratory distress.   Abdominal:      Palpations: Abdomen is soft.   Musculoskeletal:      Cervical back: Full passive range of motion without pain and neck supple.   Skin:     General: Skin is warm and dry.   Neurological:      Mental Status: He is alert and oriented to person, place, and time.      GCS: GCS eye subscore is 4. GCS verbal subscore is 5. GCS motor subscore is 6.   Psychiatric:         Speech: Speech normal.         Behavior: Behavior normal. Behavior is cooperative.       Neurologic Exam     Mental Status   Oriented to person, place, and time.   Attention: normal. Concentration: normal.   Speech: speech is normal   Level of consciousness: alert    Bright and awake.  Oriented x 3.  Follows commands without prompting showing thumbs up and 2 fingers bilaterally.     Cranial Nerves     CN II   Visual fields full to confrontation.     CN III, IV, VI   Pupils are equal, round, and reactive to light.  Extraocular  motions are normal.     CN V   Facial sensation intact.     CN VII   Facial expression full, symmetric.     CN VIII   CN VIII normal.     CN IX, X   CN IX normal.     CN XI   CN XI normal.     Motor Exam   Right arm tone: normal  Left arm tone: normal  Right leg tone: normal  Left leg tone: normal    Strength   Right deltoid: 5/5  Left deltoid: 5/5  Right biceps: 5/5  Left biceps: 5/5  Right triceps: 5/5  Left triceps: 5/5  Right wrist extension: 5/5  Left wrist extension: 5/5  Right iliopsoas: 5/5  Left iliopsoas: 5/5  Right quadriceps: 5/5  Left quadriceps: 5/5  Right anterior tibial: 5/5  Left anterior tibial: 5/5  Right gastroc: 5/5  Left gastroc: 5/5  Right EHL 5/5  Left EHL 5/5       Sensory Exam   Right arm light touch: normal  Left arm light touch: normal  Right leg light touch: normal  Left leg light touch: normal    Gait, Coordination, and Reflexes     Tremor   Resting tremor: absent  Intention tremor: absent  Action tremor: absent    Results Review:   Independent review and interpretation of imaging  Imaging Results (Last 24 Hours)     ** No results found for the last 24 hours. **        I reviewed the patient's new clinical results.  Lab Results (last 24 hours)     Procedure Component Value Units Date/Time    Comprehensive Metabolic Panel [[id number]]  (Abnormal) Collected: 07/13/22 0502    Specimen: Blood Updated: 07/13/22 0545     Glucose 155 mg/dL      BUN 6 mg/dL      Creatinine 0.72 mg/dL      Sodium 140 mmol/L      Potassium 3.4 mmol/L      Chloride 105 mmol/L      CO2 24.0 mmol/L      Calcium 8.7 mg/dL      Total Protein 7.3 g/dL      Albumin 3.40 g/dL      ALT (SGPT) 10 U/L      AST (SGOT) 10 U/L      Alkaline Phosphatase 120 U/L      Total Bilirubin 0.3 mg/dL      Globulin 3.9 gm/dL      A/G Ratio 0.9 g/dL      BUN/Creatinine Ratio 8.3     Anion Gap 11.0 mmol/L      eGFR 100.8 mL/min/1.73      Comment: National Kidney Foundation and American Society of Nephrology (ASN) Task Force recommended  calculation based on the Chronic Kidney Disease Epidemiology Collaboration (CKD-EPI) equation refit without adjustment for race.       Narrative:      GFR Normal >60  Chronic Kidney Disease <60  Kidney Failure <15      C-reactive Protein [[id number]]  (Abnormal) Collected: 07/13/22 0502    Specimen: Blood Updated: 07/13/22 0545     C-Reactive Protein 1.73 mg/dL     Sedimentation Rate [[id number]]  (Abnormal) Collected: 07/13/22 0502    Specimen: Blood Updated: 07/13/22 0533     Sed Rate 47 mm/hr     CBC & Differential [[id number]]  (Abnormal) Collected: 07/13/22 0502    Specimen: Blood Updated: 07/13/22 0524    Narrative:      The following orders were created for panel order CBC & Differential.  Procedure                               Abnormality         Status                     ---------                               -----------         ------                     CBC Auto Differential[[id number]]        Abnormal            Final result                 Please view results for these tests on the individual orders.    CBC Auto Differential [[id number]]  (Abnormal) Collected: 07/13/22 0502    Specimen: Blood Updated: 07/13/22 0524     WBC 8.16 10*3/mm3      RBC 3.50 10*6/mm3      Hemoglobin 10.8 g/dL      Hematocrit 33.0 %      MCV 94.3 fL      MCH 30.9 pg      MCHC 32.7 g/dL      RDW 12.7 %      RDW-SD 43.6 fl      MPV 9.2 fL      Platelets 322 10*3/mm3      Neutrophil % 55.6 %      Lymphocyte % 32.0 %      Monocyte % 9.7 %      Eosinophil % 1.6 %      Basophil % 0.5 %      Immature Grans % 0.6 %      Neutrophils, Absolute 4.54 10*3/mm3      Lymphocytes, Absolute 2.61 10*3/mm3      Monocytes, Absolute 0.79 10*3/mm3      Eosinophils, Absolute 0.13 10*3/mm3      Basophils, Absolute 0.04 10*3/mm3      Immature Grans, Absolute 0.05 10*3/mm3      nRBC 0.0 /100 WBC     POC Glucose Once [[id number]]  (Abnormal) Collected: 07/12/22 2237    Specimen: Blood Updated: 07/12/22 2248     Glucose 159 mg/dL      Comment: :  [email or web address] [facility] [id number]       POC Glucose Once [[id number]]  (Abnormal) Collected: 07/12/22 1650    Specimen: Blood Updated: 07/12/22 1702     Glucose 206 mg/dL      Comment: : JAK HarveyMeter ID: [id number]       Prealbumin [[id number]]  (Abnormal) Collected: 07/12/22 0433    Specimen: Blood Updated: 07/12/22 1501     Prealbumin 15.0 mg/dL     POC Glucose Once [[id number]]  (Abnormal) Collected: 07/12/22 1127    Specimen: Blood Updated: 07/12/22 1138     Glucose 194 mg/dL      Comment: : MSITUCKER Knox APSXMeter ID: [id number]           Rahul Arnold APRN

## 2022-07-14 LAB
ANION GAP SERPL CALCULATED.3IONS-SCNC: 12 MMOL/L (ref 5–15)
BASOPHILS # BLD AUTO: 0.03 10*3/MM3 (ref 0–0.2)
BASOPHILS NFR BLD AUTO: 0.4 % (ref 0–1.5)
BUN SERPL-MCNC: 10 MG/DL (ref 8–23)
BUN/CREAT SERPL: 14.7 (ref 7–25)
CALCIUM SPEC-SCNC: 8.4 MG/DL (ref 8.6–10.5)
CHLORIDE SERPL-SCNC: 105 MMOL/L (ref 98–107)
CO2 SERPL-SCNC: 21 MMOL/L (ref 22–29)
CREAT SERPL-MCNC: 0.68 MG/DL (ref 0.76–1.27)
DEPRECATED RDW RBC AUTO: 44.2 FL (ref 37–54)
EGFRCR SERPLBLD CKD-EPI 2021: 102.5 ML/MIN/1.73
EOSINOPHIL # BLD AUTO: 0.06 10*3/MM3 (ref 0–0.4)
EOSINOPHIL NFR BLD AUTO: 0.7 % (ref 0.3–6.2)
ERYTHROCYTE [DISTWIDTH] IN BLOOD BY AUTOMATED COUNT: 12.9 % (ref 12.3–15.4)
ERYTHROCYTE [SEDIMENTATION RATE] IN BLOOD: 67 MM/HR (ref 0–20)
GLUCOSE BLDC GLUCOMTR-MCNC: 160 MG/DL (ref 70–130)
GLUCOSE BLDC GLUCOMTR-MCNC: 161 MG/DL (ref 70–130)
GLUCOSE BLDC GLUCOMTR-MCNC: 165 MG/DL (ref 70–130)
GLUCOSE BLDC GLUCOMTR-MCNC: 171 MG/DL (ref 70–130)
GLUCOSE BLDC GLUCOMTR-MCNC: 173 MG/DL (ref 70–130)
GLUCOSE SERPL-MCNC: 166 MG/DL (ref 65–99)
HCT VFR BLD AUTO: 30.7 % (ref 37.5–51)
HGB BLD-MCNC: 10.2 G/DL (ref 13–17.7)
IMM GRANULOCYTES # BLD AUTO: 0.05 10*3/MM3 (ref 0–0.05)
IMM GRANULOCYTES NFR BLD AUTO: 0.6 % (ref 0–0.5)
LYMPHOCYTES # BLD AUTO: 2.09 10*3/MM3 (ref 0.7–3.1)
LYMPHOCYTES NFR BLD AUTO: 25.3 % (ref 19.6–45.3)
MAGNESIUM SERPL-MCNC: 1.8 MG/DL (ref 1.6–2.4)
MCH RBC QN AUTO: 31.2 PG (ref 26.6–33)
MCHC RBC AUTO-ENTMCNC: 33.2 G/DL (ref 31.5–35.7)
MCV RBC AUTO: 93.9 FL (ref 79–97)
MONOCYTES # BLD AUTO: 0.73 10*3/MM3 (ref 0.1–0.9)
MONOCYTES NFR BLD AUTO: 8.8 % (ref 5–12)
NEUTROPHILS NFR BLD AUTO: 5.31 10*3/MM3 (ref 1.7–7)
NEUTROPHILS NFR BLD AUTO: 64.2 % (ref 42.7–76)
NRBC BLD AUTO-RTO: 0 /100 WBC (ref 0–0.2)
PLATELET # BLD AUTO: 294 10*3/MM3 (ref 140–450)
PMV BLD AUTO: 9.2 FL (ref 6–12)
POTASSIUM SERPL-SCNC: 3.9 MMOL/L (ref 3.5–5.2)
RBC # BLD AUTO: 3.27 10*6/MM3 (ref 4.14–5.8)
SODIUM SERPL-SCNC: 138 MMOL/L (ref 136–145)
WBC NRBC COR # BLD: 8.27 10*3/MM3 (ref 3.4–10.8)

## 2022-07-14 PROCEDURE — 85652 RBC SED RATE AUTOMATED: CPT | Performed by: INTERNAL MEDICINE

## 2022-07-14 PROCEDURE — 83735 ASSAY OF MAGNESIUM: CPT | Performed by: INTERNAL MEDICINE

## 2022-07-14 PROCEDURE — 63710000001 INSULIN DETEMIR PER 5 UNITS: Performed by: NURSE PRACTITIONER

## 2022-07-14 PROCEDURE — 25010000002 CEFEPIME PER 500 MG: Performed by: INTERNAL MEDICINE

## 2022-07-14 PROCEDURE — 85025 COMPLETE CBC W/AUTO DIFF WBC: CPT | Performed by: INTERNAL MEDICINE

## 2022-07-14 PROCEDURE — 97116 GAIT TRAINING THERAPY: CPT

## 2022-07-14 PROCEDURE — 80048 BASIC METABOLIC PNL TOTAL CA: CPT | Performed by: INTERNAL MEDICINE

## 2022-07-14 PROCEDURE — 82962 GLUCOSE BLOOD TEST: CPT

## 2022-07-14 PROCEDURE — 97110 THERAPEUTIC EXERCISES: CPT

## 2022-07-14 PROCEDURE — 63710000001 INSULIN LISPRO (HUMAN) PER 5 UNITS: Performed by: INTERNAL MEDICINE

## 2022-07-14 RX ORDER — DILTIAZEM HYDROCHLORIDE 120 MG/1
120 CAPSULE, COATED, EXTENDED RELEASE ORAL
Status: DISPENSED | OUTPATIENT
Start: 2022-07-14 | End: 2022-07-14

## 2022-07-14 RX ORDER — HYDROCODONE BITARTRATE AND ACETAMINOPHEN 10; 325 MG/1; MG/1
1 TABLET ORAL 4 TIMES DAILY PRN
Status: DISCONTINUED | OUTPATIENT
Start: 2022-07-14 | End: 2022-07-25 | Stop reason: HOSPADM

## 2022-07-14 RX ORDER — SOTALOL HYDROCHLORIDE 80 MG/1
120 TABLET ORAL EVERY 12 HOURS SCHEDULED
Status: DISCONTINUED | OUTPATIENT
Start: 2022-07-14 | End: 2022-07-25 | Stop reason: HOSPADM

## 2022-07-14 RX ORDER — DILTIAZEM HYDROCHLORIDE 120 MG/1
240 CAPSULE, COATED, EXTENDED RELEASE ORAL
Status: DISCONTINUED | OUTPATIENT
Start: 2022-07-15 | End: 2022-07-25 | Stop reason: HOSPADM

## 2022-07-14 NOTE — PROGRESS NOTES
Infectious Diseases Progress Note    Patient:  Elijah Escobar  YOB: 1955  MRN: [id number]   Admit date: 7/11/2022   Admitting Physician: Mono Madsen MD  Primary Care Physician: Dylan Lama APRN    Chief Complaint/Interval History: He is without fever.  He reports no headache at present.  I explained he needed antibiotic treatment through at least July 22.  After I explained that he asked me if he was going home today.  Explained he needed a longer course of IV antibiotic therapy.  He then asked if he was going home tomorrow.  He is conversant as if he understands and then will asked some questions at times that seems to point to the contrary.  No diarrhea or rash.  He had a peripheral IV site left mid forearm that was uncomfortable.  He has a new site left hand.    No intake or output data in the 24 hours ending 07/14/22 1609  Allergies: No Known Allergies  Current Scheduled Medications:   aspirin, 81 mg, Oral, Daily  atorvastatin, 20 mg, Oral, Nightly  cefepime, 2 g, Intravenous, Q8H  dilTIAZem CD, 120 mg, Oral, Once  [START ON 7/15/2022] dilTIAZem CD, 240 mg, Oral, Q24H  famotidine, 40 mg, Oral, Daily  gabapentin, 300 mg, Oral, TID  insulin detemir, 35 Units, Subcutaneous, Nightly  insulin lispro, 0-14 Units, Subcutaneous, 4x Daily AC & at Bedtime  isosorbide mononitrate, 60 mg, Oral, Q24H  levETIRAcetam, 500 mg, Oral, BID  lisinopril, 40 mg, Oral, Daily  metoprolol tartrate, 50 mg, Oral, Q12H  nicotine, 1 patch, Transdermal, Q24H  saccharomyces boulardii, 250 mg, Oral, BID  senna-docusate sodium, 2 tablet, Oral, Daily  sodium chloride, 10 mL, Intravenous, Q12H  sotalol, 120 mg, Oral, Q12H      Current PRN Medications:  •  acetaminophen **OR** acetaminophen  •  senna-docusate sodium **AND** polyethylene glycol **AND** bisacodyl **AND** bisacodyl  •  dextrose  •  dextrose  •  glucagon (human recombinant)  •  HYDROcodone-acetaminophen  •  labetalol  •  ondansetron **OR**  "ondansetron  •  sodium chloride    Review of Systems    Vital Signs:  Ht 177.8 cm (70\")   Wt 112 kg (247 lb 0.4 oz)   BMI 35.44 kg/m²     Physical Exam  Vital signs - reviewed.  Line/IV (new site left hand) site - No erythema, warmth, induration, or tenderness.  There is some induration left forearm at old IV site.  No real tenderness or fluctuance.  No purulence.  Skin without drug rash  Operative site from craniotomy well approximated.  No drainage.    Lab Results:  CBC: Results from last 7 days   Lab Units 07/14/22  0527 07/13/22  0502 07/10/22  0219   WBC 10*3/mm3 8.27 8.16 6.96   HEMOGLOBIN g/dL 10.2* 10.8* 9.6*   HEMATOCRIT % 30.7* 33.0* 30.3*   PLATELETS 10*3/mm3 294 322 259     BMP:  Results from last 7 days   Lab Units 07/14/22 0527 07/13/22  0502 07/10/22  0219   SODIUM mmol/L 138 140 138   POTASSIUM mmol/L 3.9 3.4* 3.5   CHLORIDE mmol/L 105 105 105   CO2 mmol/L 21.0* 24.0 22.0   BUN mg/dL 10 6* 6*   CREATININE mg/dL 0.68* 0.72* 0.67*   GLUCOSE mg/dL 166* 155* 147*   CALCIUM mg/dL 8.4* 8.7 8.2*   ALT (SGPT) U/L  --  10 9     Culture Results:   Urine Culture   Date Value Ref Range Status   07/09/2022 No growth  Final     Radiology: None  Additional Studies Reviewed: None    Impression:   1.  Subgaleal/epidural infection with Serratia-stable to improving  He seems to have some confusion at times, but will follow commands appropriately and has no localizing findings on exam.  2.  Acute kidney injury-resolved  3.  Diabetes mellitus  4.  Coronary disease    Recommendations:   Continue cefepime  Continue physical therapy  Planning tentative stop date for cefepime of July 22, 2022 (3 weeks following drainage)  Will continue to follow  Will see every 3 to 4 days  If new problems or concerns for infectious diseases in the interim-please call    Edwin Jeffers MD  "

## 2022-07-14 NOTE — CONSULTS
Have spoken with both pt's primary nurse and charge nurse.  Monday's assessment of arms for ultrasound guided peripheral iv revealed multiple areas of noncompressible veins (charge nurse was notified of findings).  Will need vascular ultrasound to rule out clots before picc or midline can be placed.

## 2022-07-14 NOTE — PROGRESS NOTES
FARSHAD Salamanca APRN        Internal Medicine Progress Note    7/14/2022   13:53 CDT    Name:  Elijah Escobar  MRN:    [id number]     Acct:     [id number]   Room:  [facility] Day: 0     Admit Date: 7/11/2022  3:17 PM  PCP: Dylan Lama APRN    Subjective:     C/C: weakness, confusion    Interval History: Status:  Improved. Resting in bed. No family at bedside. Afebrile. Progressing with therapies. 02 sats stable on room air.  Alert and oriented at present time. Potassium corrected. Blood sugars improved. Counts stable. Home medications resumed. Had still been having elevated heart rates - improved with home dose of cardizem and betapace. Anxious for discharge.     Review of Systems   Constitutional: Positive for malaise/fatigue. Negative for chills, decreased appetite, weight gain and weight loss.   HENT: Negative for congestion, ear discharge, hoarse voice and tinnitus.    Eyes: Negative for blurred vision, discharge, visual disturbance and visual halos.   Cardiovascular: Negative for chest pain, claudication, dyspnea on exertion, irregular heartbeat, leg swelling, orthopnea and paroxysmal nocturnal dyspnea.   Respiratory: Negative for cough, shortness of breath, sputum production and wheezing.    Endocrine: Negative for cold intolerance, heat intolerance and polyuria.   Hematologic/Lymphatic: Negative for adenopathy. Does not bruise/bleed easily.   Skin: Negative for dry skin, itching and suspicious lesions.   Musculoskeletal: Negative for arthritis, back pain, falls, joint pain, muscle weakness and myalgias.   Gastrointestinal: Negative for abdominal pain, constipation, diarrhea, dysphagia and hematemesis.   Genitourinary: Negative for bladder incontinence, dysuria and frequency.   Neurological: Positive for headaches and weakness. Negative for aphonia, disturbances in coordination and dizziness.   Psychiatric/Behavioral: Negative for altered mental status,  depression, memory loss and substance abuse. The patient does not have insomnia and is not nervous/anxious.          Medications:     Allergies: No Known Allergies    Current Meds:   Current Facility-Administered Medications:   •  acetaminophen (TYLENOL) tablet 650 mg, 650 mg, Oral, Q4H PRN **OR** acetaminophen (TYLENOL) suppository 650 mg, 650 mg, Rectal, Q4H PRN, Mono Madsen MD  •  aspirin chewable tablet 81 mg, 81 mg, Oral, Daily, Mono Madsen MD  •  atorvastatin (LIPITOR) tablet 20 mg, 20 mg, Oral, Nightly, Mono Madsen MD  •  sennosides-docusate (PERICOLACE) 8.6-50 MG per tablet 2 tablet, 2 tablet, Oral, Daily **AND** polyethylene glycol (MIRALAX) packet 17 g, 17 g, Oral, Daily PRN **AND** bisacodyl (DULCOLAX) EC tablet 5 mg, 5 mg, Oral, Daily PRN **AND** bisacodyl (DULCOLAX) suppository 10 mg, 10 mg, Rectal, Daily PRN, Mono Madsen MD  •  cefepime (MAXIPIME) 2 g/100 mL 0.9% NS (mbp), 2 g, Intravenous, Q8H, Mono Madsen MD  •  dextrose (D50W) (25 g/50 mL) IV injection 25 g, 25 g, Intravenous, Q15 Min PRN, Mono Madsen MD  •  dextrose (GLUTOSE) oral gel 15 g, 15 g, Oral, Q15 Min PRN, Mono Madsen MD  •  dilTIAZem (CARDIZEM) 125 mg in 125 mL 0.7% sodium chloride  infusion, 5-15 mg/hr, Intravenous, Titrated, Mono Madsen MD  •  dilTIAZem CD (CARDIZEM CD) 24 hr capsule 120 mg, 120 mg, Oral, Once, Mono Madsen MD  •  [START ON 7/15/2022] dilTIAZem CD (CARDIZEM CD) 24 hr capsule 240 mg, 240 mg, Oral, Q24H, Mono Madsen MD  •  famotidine (PEPCID) tablet 40 mg, 40 mg, Oral, Daily, Mono Madsen MD  •  gabapentin (NEURONTIN) capsule 300 mg, 300 mg, Oral, TID, Mono Madsen MD  •  glucagon (human recombinant) (GLUCAGEN DIAGNOSTIC) injection 1 mg, 1 mg, Intramuscular, Q15 Min PRN, Mono Madsen MD  •  HYDROcodone-acetaminophen (NORCO)  MG per tablet 1 tablet, 1 tablet, Oral, 4x Daily PRN,  "Latoya Solorio APRN  •  insulin detemir (LEVEMIR) injection 35 Units, 35 Units, Subcutaneous, Nightly, Latoya Solorio APRN  •  Insulin Lispro (humaLOG) injection 0-14 Units, 0-14 Units, Subcutaneous, 4x Daily AC & at Bedtime, Mono Madsen MD  •  isosorbide mononitrate (IMDUR) 24 hr tablet 60 mg, 60 mg, Oral, Q24H, Mono Madsen MD  •  labetalol (NORMODYNE,TRANDATE) injection 10 mg, 10 mg, Intravenous, Q6H PRN, Mono Madsen MD  •  levETIRAcetam (KEPPRA) tablet 500 mg, 500 mg, Oral, BID, Mono Madsen MD  •  lisinopril (PRINIVIL,ZESTRIL) tablet 40 mg, 40 mg, Oral, Daily, Mono Madsen MD  •  metoprolol tartrate (LOPRESSOR) tablet 50 mg, 50 mg, Oral, Q12H, Mono Madsen MD  •  nicotine (NICODERM CQ) 14 MG/24HR patch 1 patch, 1 patch, Transdermal, Q24H, Latoya Solorio APRN  •  ondansetron (ZOFRAN) tablet 4 mg, 4 mg, Oral, Q6H PRN **OR** ondansetron (ZOFRAN) injection 4 mg, 4 mg, Intravenous, Q6H PRN, Mono Madsen MD  •  saccharomyces boulardii (FLORASTOR) capsule 250 mg, 250 mg, Oral, BID, Mono Madsen MD  •  sodium chloride 0.9 % flush 10 mL, 10 mL, Intravenous, Q12H, Mono Madsen MD  •  sodium chloride 0.9 % flush 10 mL, 10 mL, Intravenous, PRN, Mono Madsen MD  •  sotalol (BETAPACE) tablet 120 mg, 120 mg, Oral, Q12H, Mono Madsen MD    Data:     Code Status:    There are no questions and answers to display.       Family History   Problem Relation Age of Onset   • Cancer Mother         liver   • Heart disease Father        Social History     Socioeconomic History   • Marital status:    Tobacco Use   • Smoking status: Current Every Day Smoker     Packs/day: 0.50   • Smokeless tobacco: Never Used   Vaping Use   • Vaping Use: Never used   Substance and Sexual Activity   • Alcohol use: Never   • Drug use: Never   • Sexual activity: Defer       Vitals:  Ht 177.8 cm (70\")   Wt 112 kg (247 lb " 0.4 oz)   BMI 35.44 kg/m²   T 98.4 P 130 R 16 /81 Sp02 95% (room air)          I/O (24Hr):  No intake or output data in the 24 hours ending 07/14/22 1353    Labs and imaging:      Recent Results (from the past 12 hour(s))   Basic Metabolic Panel    Collection Time: 07/14/22  5:27 AM    Specimen: Blood   Result Value Ref Range    Glucose 166 (H) 65 - 99 mg/dL    BUN 10 8 - 23 mg/dL    Creatinine 0.68 (L) 0.76 - 1.27 mg/dL    Sodium 138 136 - 145 mmol/L    Potassium 3.9 3.5 - 5.2 mmol/L    Chloride 105 98 - 107 mmol/L    CO2 21.0 (L) 22.0 - 29.0 mmol/L    Calcium 8.4 (L) 8.6 - 10.5 mg/dL    BUN/Creatinine Ratio 14.7 7.0 - 25.0    Anion Gap 12.0 5.0 - 15.0 mmol/L    eGFR 102.5 >60.0 mL/min/1.73   Magnesium    Collection Time: 07/14/22  5:27 AM    Specimen: Blood   Result Value Ref Range    Magnesium 1.8 1.6 - 2.4 mg/dL   Sedimentation Rate    Collection Time: 07/14/22  5:27 AM    Specimen: Blood   Result Value Ref Range    Sed Rate 67 (H) 0 - 20 mm/hr   CBC Auto Differential    Collection Time: 07/14/22  5:27 AM    Specimen: Blood   Result Value Ref Range    WBC 8.27 3.40 - 10.80 10*3/mm3    RBC 3.27 (L) 4.14 - 5.80 10*6/mm3    Hemoglobin 10.2 (L) 13.0 - 17.7 g/dL    Hematocrit 30.7 (L) 37.5 - 51.0 %    MCV 93.9 79.0 - 97.0 fL    MCH 31.2 26.6 - 33.0 pg    MCHC 33.2 31.5 - 35.7 g/dL    RDW 12.9 12.3 - 15.4 %    RDW-SD 44.2 37.0 - 54.0 fl    MPV 9.2 6.0 - 12.0 fL    Platelets 294 140 - 450 10*3/mm3    Neutrophil % 64.2 42.7 - 76.0 %    Lymphocyte % 25.3 19.6 - 45.3 %    Monocyte % 8.8 5.0 - 12.0 %    Eosinophil % 0.7 0.3 - 6.2 %    Basophil % 0.4 0.0 - 1.5 %    Immature Grans % 0.6 (H) 0.0 - 0.5 %    Neutrophils, Absolute 5.31 1.70 - 7.00 10*3/mm3    Lymphocytes, Absolute 2.09 0.70 - 3.10 10*3/mm3    Monocytes, Absolute 0.73 0.10 - 0.90 10*3/mm3    Eosinophils, Absolute 0.06 0.00 - 0.40 10*3/mm3    Basophils, Absolute 0.03 0.00 - 0.20 10*3/mm3    Immature Grans, Absolute 0.05 0.00 - 0.05 10*3/mm3    nRBC 0.0 0.0  - 0.2 /100 WBC   POC Glucose Once    Collection Time: 07/14/22  7:42 AM    Specimen: Blood   Result Value Ref Range    Glucose 161 (H) 70 - 130 mg/dL   POC Glucose Once    Collection Time: 07/14/22 11:12 AM    Specimen: Blood   Result Value Ref Range    Glucose 173 (H) 70 - 130 mg/dL         Physical Examination:        Physical Exam  Vitals and nursing note reviewed.   Constitutional:       Appearance: He is well-developed.   HENT:      Head: Normocephalic and atraumatic.      Right Ear: External ear normal.      Left Ear: External ear normal.      Nose: Nose normal.      Mouth/Throat:      Mouth: Mucous membranes are dry.      Pharynx: Oropharynx is clear.   Eyes:      Pupils: Pupils are equal, round, and reactive to light.   Cardiovascular:      Rate and Rhythm: Tachycardia present. Rhythm irregular.      Pulses: Normal pulses.      Heart sounds: Normal heart sounds.   Pulmonary:      Effort: Pulmonary effort is normal.      Breath sounds: Normal breath sounds.   Abdominal:      General: Bowel sounds are normal.      Palpations: Abdomen is soft.      Comments: obese   Musculoskeletal:         General: Normal range of motion.      Cervical back: Normal range of motion and neck supple.      Comments: Generalized weakness   Skin:     General: Skin is warm and dry.      Comments: Staples intact   Neurological:      Mental Status: He is alert and oriented to person, place, and time.      Deep Tendon Reflexes: Reflexes are normal and symmetric.      Comments: Intermittent confusion   Psychiatric:         Behavior: Behavior normal.           Assessment:           * No active hospital problems. *    Past Medical History:   Diagnosis Date   • Brain tumor (HCC)    • Coronary artery disease    • COVID-19 vaccine series completed     MODERNA X 3; LAST DOSE 3/2022   • Diabetes (HCC)    • Erectile dysfunction    • GERD (gastroesophageal reflux disease)    • Hypercholesteremia    • Hypertension         Plan:        1. Serratia  marcescens epidural infection  2. Atrial fibrillation with RVR  3. DM2 with hyperglycemia on long term insulin  4. HTN  5. Obesity  6. HLD  7. GERD  8. Hypokalemia    Continue current treatment. Monitor counts. Increase activity. Labs Monday.  Aggressive therapies as tolerated. Maintain patient safety. Continue IV antibiotics under direction of ID. Continue heart rate and rhythm control efforts - resume home dose betapace and cardizem. Neurontin and norco resumed - will change norco to prn. Glycemic control - adjust insulin regimen. Continue tobacco cessation efforts and nicotine replacement.         Electronically signed by ADITYA Marino on 7/14/2022 at 13:53 CDT

## 2022-07-15 ENCOUNTER — APPOINTMENT (OUTPATIENT)
Dept: ULTRASOUND IMAGING | Facility: HOSPITAL | Age: 67
End: 2022-07-15

## 2022-07-15 LAB
GLUCOSE BLDC GLUCOMTR-MCNC: 146 MG/DL (ref 70–130)
GLUCOSE BLDC GLUCOMTR-MCNC: 169 MG/DL (ref 70–130)
GLUCOSE BLDC GLUCOMTR-MCNC: 251 MG/DL (ref 70–130)
GLUCOSE BLDC GLUCOMTR-MCNC: 82 MG/DL (ref 70–130)

## 2022-07-15 PROCEDURE — 82962 GLUCOSE BLOOD TEST: CPT

## 2022-07-15 PROCEDURE — 25010000002 CEFEPIME PER 500 MG: Performed by: INTERNAL MEDICINE

## 2022-07-15 PROCEDURE — 97535 SELF CARE MNGMENT TRAINING: CPT

## 2022-07-15 PROCEDURE — 93970 EXTREMITY STUDY: CPT | Performed by: SURGERY

## 2022-07-15 PROCEDURE — 93970 EXTREMITY STUDY: CPT

## 2022-07-15 PROCEDURE — 97116 GAIT TRAINING THERAPY: CPT

## 2022-07-15 PROCEDURE — 92507 TX SP LANG VOICE COMM INDIV: CPT

## 2022-07-15 PROCEDURE — 97530 THERAPEUTIC ACTIVITIES: CPT

## 2022-07-15 PROCEDURE — 63710000001 INSULIN LISPRO (HUMAN) PER 5 UNITS: Performed by: INTERNAL MEDICINE

## 2022-07-15 RX ORDER — HALOPERIDOL 5 MG/ML
5 INJECTION INTRAMUSCULAR
Status: ACTIVE | OUTPATIENT
Start: 2022-07-15 | End: 2022-07-16

## 2022-07-15 NOTE — PROGRESS NOTES
FARSHAD Salamanca APRN        Internal Medicine Progress Note    7/15/2022   16:05 CDT    Name:  Elijah Escobar  MRN:    [id number]     Acct:     [id number]   Room:  [facility] Day: 0     Admit Date: 7/11/2022  3:17 PM  PCP: Dylan Lama APRN    Subjective:     C/C: weakness, confusion    Interval History: Status:  Improved. Up to chair. No family at bedside. Helmet in place.  Afebrile. Progressing with therapies. 02 sats stable on room air.  Alert and oriented at present time.  Blood sugars improved.  Anxious for discharge.     Review of Systems   Constitutional: Positive for malaise/fatigue. Negative for chills, decreased appetite, weight gain and weight loss.   HENT: Negative for congestion, ear discharge, hoarse voice and tinnitus.    Eyes: Negative for blurred vision, discharge, visual disturbance and visual halos.   Cardiovascular: Negative for chest pain, claudication, dyspnea on exertion, irregular heartbeat, leg swelling, orthopnea and paroxysmal nocturnal dyspnea.   Respiratory: Negative for cough, shortness of breath, sputum production and wheezing.    Endocrine: Negative for cold intolerance, heat intolerance and polyuria.   Hematologic/Lymphatic: Negative for adenopathy. Does not bruise/bleed easily.   Skin: Negative for dry skin, itching and suspicious lesions.   Musculoskeletal: Negative for arthritis, back pain, falls, joint pain, muscle weakness and myalgias.   Gastrointestinal: Negative for abdominal pain, constipation, diarrhea, dysphagia and hematemesis.   Genitourinary: Negative for bladder incontinence, dysuria and frequency.   Neurological: Positive for headaches and weakness. Negative for aphonia, disturbances in coordination and dizziness.   Psychiatric/Behavioral: Negative for altered mental status, depression, memory loss and substance abuse. The patient does not have insomnia and is not nervous/anxious.          Medications:     Allergies: No  Known Allergies    Current Meds:   Current Facility-Administered Medications:   •  acetaminophen (TYLENOL) tablet 650 mg, 650 mg, Oral, Q4H PRN **OR** acetaminophen (TYLENOL) suppository 650 mg, 650 mg, Rectal, Q4H PRN, Mono Madsen MD  •  aspirin chewable tablet 81 mg, 81 mg, Oral, Daily, Mono Madsen MD  •  atorvastatin (LIPITOR) tablet 20 mg, 20 mg, Oral, Nightly, Mono Madsen MD  •  sennosides-docusate (PERICOLACE) 8.6-50 MG per tablet 2 tablet, 2 tablet, Oral, Daily **AND** polyethylene glycol (MIRALAX) packet 17 g, 17 g, Oral, Daily PRN **AND** bisacodyl (DULCOLAX) EC tablet 5 mg, 5 mg, Oral, Daily PRN **AND** bisacodyl (DULCOLAX) suppository 10 mg, 10 mg, Rectal, Daily PRN, Mono Madsen MD  •  cefepime (MAXIPIME) 2 g/100 mL 0.9% NS (mbp), 2 g, Intravenous, Q8H, Mono Madsen MD  •  dextrose (D50W) (25 g/50 mL) IV injection 25 g, 25 g, Intravenous, Q15 Min PRN, Mono Madsen MD  •  dextrose (GLUTOSE) oral gel 15 g, 15 g, Oral, Q15 Min PRN, Mono Madsen MD  •  dilTIAZem (CARDIZEM) 125 mg in 125 mL 0.7% sodium chloride  infusion, 5-15 mg/hr, Intravenous, Titrated, Mono Madsen MD  •  dilTIAZem CD (CARDIZEM CD) 24 hr capsule 240 mg, 240 mg, Oral, Q24H, Mono Madsen MD  •  famotidine (PEPCID) tablet 40 mg, 40 mg, Oral, Daily, Mono Madsen MD  •  gabapentin (NEURONTIN) capsule 300 mg, 300 mg, Oral, TID, Mono Madsen MD  •  glucagon (human recombinant) (GLUCAGEN DIAGNOSTIC) injection 1 mg, 1 mg, Intramuscular, Q15 Min PRN, Mono Madsen MD  •  HYDROcodone-acetaminophen (NORCO)  MG per tablet 1 tablet, 1 tablet, Oral, 4x Daily PRN, Latoya Solorio APRN  •  insulin detemir (LEVEMIR) injection 35 Units, 35 Units, Subcutaneous, Nightly, Latoya Solorio APRN  •  Insulin Lispro (humaLOG) injection 0-14 Units, 0-14 Units, Subcutaneous, 4x Daily AC & at Bedtime, Mono Madsen MD  •   "isosorbide mononitrate (IMDUR) 24 hr tablet 60 mg, 60 mg, Oral, Q24H, Mono Madsen MD  •  labetalol (NORMODYNE,TRANDATE) injection 10 mg, 10 mg, Intravenous, Q6H PRN, Mono Madsen MD  •  levETIRAcetam (KEPPRA) tablet 500 mg, 500 mg, Oral, BID, Mono Madsen MD  •  lisinopril (PRINIVIL,ZESTRIL) tablet 40 mg, 40 mg, Oral, Daily, Mono Madsen MD  •  metoprolol tartrate (LOPRESSOR) tablet 50 mg, 50 mg, Oral, Q12H, Mono Madsen MD  •  nicotine (NICODERM CQ) 14 MG/24HR patch 1 patch, 1 patch, Transdermal, Q24H, Latoya Solorio APRN  •  ondansetron (ZOFRAN) tablet 4 mg, 4 mg, Oral, Q6H PRN **OR** ondansetron (ZOFRAN) injection 4 mg, 4 mg, Intravenous, Q6H PRN, Mono Madsen MD  •  saccharomyces boulardii (FLORASTOR) capsule 250 mg, 250 mg, Oral, BID, Mono Madsen MD  •  sodium chloride 0.9 % flush 10 mL, 10 mL, Intravenous, Q12H, Mono Madsen MD  •  sodium chloride 0.9 % flush 10 mL, 10 mL, Intravenous, PRN, Mono Madsen MD  •  sotalol (BETAPACE) tablet 120 mg, 120 mg, Oral, Q12H, Mono Madsen MD    Data:     Code Status:    There are no questions and answers to display.       Family History   Problem Relation Age of Onset   • Cancer Mother         liver   • Heart disease Father        Social History     Socioeconomic History   • Marital status:    Tobacco Use   • Smoking status: Current Every Day Smoker     Packs/day: 0.50   • Smokeless tobacco: Never Used   Vaping Use   • Vaping Use: Never used   Substance and Sexual Activity   • Alcohol use: Never   • Drug use: Never   • Sexual activity: Defer       Vitals:  Ht 177.8 cm (70\")   Wt 112 kg (247 lb 0.4 oz)   BMI 35.44 kg/m²   T 98.2 P 92 R 15 /92 Sp02 98% (room air)          I/O (24Hr):  No intake or output data in the 24 hours ending 07/15/22 1605    Labs and imaging:      Recent Results (from the past 12 hour(s))   POC Glucose Once    Collection Time: " 07/15/22  7:33 AM    Specimen: Blood   Result Value Ref Range    Glucose 146 (H) 70 - 130 mg/dL   POC Glucose Once    Collection Time: 07/15/22 10:59 AM    Specimen: Blood   Result Value Ref Range    Glucose 169 (H) 70 - 130 mg/dL         Physical Examination:        Physical Exam  Vitals and nursing note reviewed.   Constitutional:       Appearance: He is well-developed.   HENT:      Head: Normocephalic and atraumatic.      Right Ear: External ear normal.      Left Ear: External ear normal.      Nose: Nose normal.      Mouth/Throat:      Mouth: Mucous membranes are dry.      Pharynx: Oropharynx is clear.   Eyes:      Pupils: Pupils are equal, round, and reactive to light.   Cardiovascular:      Rate and Rhythm: Normal rate. Rhythm irregular.      Pulses: Normal pulses.      Heart sounds: Normal heart sounds.   Pulmonary:      Effort: Pulmonary effort is normal.      Breath sounds: Normal breath sounds.   Abdominal:      General: Bowel sounds are normal.      Palpations: Abdomen is soft.      Comments: obese   Musculoskeletal:         General: Normal range of motion.      Cervical back: Normal range of motion and neck supple.      Comments: Generalized weakness   Skin:     General: Skin is warm and dry.      Comments: Staples intact   Neurological:      Mental Status: He is alert and oriented to person, place, and time.      Deep Tendon Reflexes: Reflexes are normal and symmetric.      Comments: Intermittent confusion   Psychiatric:         Behavior: Behavior normal.           Assessment:           * No active hospital problems. *    Past Medical History:   Diagnosis Date   • Brain tumor (HCC)    • Coronary artery disease    • COVID-19 vaccine series completed     MODERNA X 3; LAST DOSE 3/2022   • Diabetes (HCC)    • Erectile dysfunction    • GERD (gastroesophageal reflux disease)    • Hypercholesteremia    • Hypertension         Plan:        1. Serratia marcescens epidural infection  2. Atrial fibrillation with  RVR  3. DM2 with hyperglycemia on long term insulin  4. HTN  5. Obesity  6. HLD  7. GERD  8. Hypokalemia    Continue current treatment. Monitor counts. Increase activity. Labs Monday.  Aggressive therapies as tolerated. Maintain patient safety. Continue IV antibiotics under direction of ID. Continue heart rate and rhythm control efforts - resume home dose betapace and cardizem. Neurontin and norco resumed - will change norco to prn. Glycemic control - adjust insulin regimen. Continue tobacco cessation efforts and nicotine replacement.         Electronically signed by ADITYA Marino on 7/15/2022 at 16:05 CDT

## 2022-07-16 LAB
GLUCOSE BLDC GLUCOMTR-MCNC: 178 MG/DL (ref 70–130)
GLUCOSE BLDC GLUCOMTR-MCNC: 178 MG/DL (ref 70–130)
GLUCOSE BLDC GLUCOMTR-MCNC: 191 MG/DL (ref 70–130)
GLUCOSE BLDC GLUCOMTR-MCNC: 210 MG/DL (ref 70–130)

## 2022-07-16 PROCEDURE — 63710000001 INSULIN LISPRO (HUMAN) PER 5 UNITS: Performed by: INTERNAL MEDICINE

## 2022-07-16 PROCEDURE — 82962 GLUCOSE BLOOD TEST: CPT

## 2022-07-16 PROCEDURE — 25010000002 CEFEPIME PER 500 MG: Performed by: INTERNAL MEDICINE

## 2022-07-16 PROCEDURE — 63710000001 INSULIN DETEMIR PER 5 UNITS: Performed by: NURSE PRACTITIONER

## 2022-07-16 NOTE — PROGRESS NOTES
FARSHAD Salamanca APRN        Internal Medicine Progress Note    7/16/2022   08:48 CDT    Name:  Elijah Escobar  MRN:    [id number]     Acct:     [id number]   Room:  [address] [place] [address] Day: 0     Admit Date: 7/11/2022  3:17 PM  PCP: Dylan Lama APRN    Subjective:     C/C: weakness, confusion    Interval History: Status:  Improved. Resting in bed. No family at bedside. Woke from sleep. Afebrile. Progressing with therapies. 02 sats stable on room air.  Herat rates stable. Alert and oriented at present time.  Blood sugars improved.  Anxious for discharge.     Review of Systems   Constitutional: Positive for malaise/fatigue. Negative for chills, decreased appetite, weight gain and weight loss.   HENT: Negative for congestion, ear discharge, hoarse voice and tinnitus.    Eyes: Negative for blurred vision, discharge, visual disturbance and visual halos.   Cardiovascular: Negative for chest pain, claudication, dyspnea on exertion, irregular heartbeat, leg swelling, orthopnea and paroxysmal nocturnal dyspnea.   Respiratory: Negative for cough, shortness of breath, sputum production and wheezing.    Endocrine: Negative for cold intolerance, heat intolerance and polyuria.   Hematologic/Lymphatic: Negative for adenopathy. Does not bruise/bleed easily.   Skin: Negative for dry skin, itching and suspicious lesions.   Musculoskeletal: Negative for arthritis, back pain, falls, joint pain, muscle weakness and myalgias.   Gastrointestinal: Negative for abdominal pain, constipation, diarrhea, dysphagia and hematemesis.   Genitourinary: Negative for bladder incontinence, dysuria and frequency.   Neurological: Positive for headaches and weakness. Negative for aphonia, disturbances in coordination and dizziness.   Psychiatric/Behavioral: Negative for altered mental status, depression, memory loss and substance abuse. The patient does not have insomnia and is not nervous/anxious.           Medications:     Allergies: No Known Allergies    Current Meds:   Current Facility-Administered Medications:   •  acetaminophen (TYLENOL) tablet 650 mg, 650 mg, Oral, Q4H PRN **OR** acetaminophen (TYLENOL) suppository 650 mg, 650 mg, Rectal, Q4H PRN, Mono Madsen MD  •  aspirin chewable tablet 81 mg, 81 mg, Oral, Daily, Mono Madsen MD  •  atorvastatin (LIPITOR) tablet 20 mg, 20 mg, Oral, Nightly, Mono Madsen MD  •  sennosides-docusate (PERICOLACE) 8.6-50 MG per tablet 2 tablet, 2 tablet, Oral, Daily **AND** polyethylene glycol (MIRALAX) packet 17 g, 17 g, Oral, Daily PRN **AND** bisacodyl (DULCOLAX) EC tablet 5 mg, 5 mg, Oral, Daily PRN **AND** bisacodyl (DULCOLAX) suppository 10 mg, 10 mg, Rectal, Daily PRN, Mono Madsen MD  •  cefepime (MAXIPIME) 2 g/100 mL 0.9% NS (mbp), 2 g, Intravenous, Q8H, Mono Madsen MD  •  dextrose (D50W) (25 g/50 mL) IV injection 25 g, 25 g, Intravenous, Q15 Min PRN, Mono Madsen MD  •  dextrose (GLUTOSE) oral gel 15 g, 15 g, Oral, Q15 Min PRN, Mono Madsen MD  •  dilTIAZem (CARDIZEM) 125 mg in 125 mL 0.7% sodium chloride  infusion, 5-15 mg/hr, Intravenous, Titrated, Mono Madsen MD  •  dilTIAZem CD (CARDIZEM CD) 24 hr capsule 240 mg, 240 mg, Oral, Q24H, Mono Madsen MD  •  famotidine (PEPCID) tablet 40 mg, 40 mg, Oral, Daily, Mono Madsen MD  •  gabapentin (NEURONTIN) capsule 300 mg, 300 mg, Oral, TID, Mono Madsen MD  •  glucagon (human recombinant) (GLUCAGEN DIAGNOSTIC) injection 1 mg, 1 mg, Intramuscular, Q15 Min PRN, Mono Madsen MD  •  haloperidol lactate (HALDOL) injection 5 mg, 5 mg, Intramuscular, Once, Lynn Ballard APRN  •  HYDROcodone-acetaminophen (NORCO)  MG per tablet 1 tablet, 1 tablet, Oral, 4x Daily PRN, Latoya Solorio APRN  •  insulin detemir (LEVEMIR) injection 35 Units, 35 Units, Subcutaneous, Nightly, Latoya Solorio,  "APRN  •  Insulin Lispro (humaLOG) injection 0-14 Units, 0-14 Units, Subcutaneous, 4x Daily AC & at Bedtime, Mono Madsen MD  •  isosorbide mononitrate (IMDUR) 24 hr tablet 60 mg, 60 mg, Oral, Q24H, Mono Madsen MD  •  labetalol (NORMODYNE,TRANDATE) injection 10 mg, 10 mg, Intravenous, Q6H PRN, Mono Madsen MD  •  levETIRAcetam (KEPPRA) tablet 500 mg, 500 mg, Oral, BID, Mono Madsen MD  •  lisinopril (PRINIVIL,ZESTRIL) tablet 40 mg, 40 mg, Oral, Daily, Moon Madsen MD  •  metoprolol tartrate (LOPRESSOR) tablet 50 mg, 50 mg, Oral, Q12H, Mono Madsen MD  •  nicotine (NICODERM CQ) 14 MG/24HR patch 1 patch, 1 patch, Transdermal, Q24H, Latoya Solorio, APRN  •  ondansetron (ZOFRAN) tablet 4 mg, 4 mg, Oral, Q6H PRN **OR** ondansetron (ZOFRAN) injection 4 mg, 4 mg, Intravenous, Q6H PRN, Mono Madsen MD  •  saccharomyces boulardii (FLORASTOR) capsule 250 mg, 250 mg, Oral, BID, Mono Madsen MD  •  sodium chloride 0.9 % flush 10 mL, 10 mL, Intravenous, Q12H, Mono Madsen MD  •  sodium chloride 0.9 % flush 10 mL, 10 mL, Intravenous, PRN, Mono Madsen MD  •  sotalol (BETAPACE) tablet 120 mg, 120 mg, Oral, Q12H, Mono Madsen MD    Data:     Code Status:    There are no questions and answers to display.       Family History   Problem Relation Age of Onset   • Cancer Mother         liver   • Heart disease Father        Social History     Socioeconomic History   • Marital status:    Tobacco Use   • Smoking status: Current Every Day Smoker     Packs/day: 0.50   • Smokeless tobacco: Never Used   Vaping Use   • Vaping Use: Never used   Substance and Sexual Activity   • Alcohol use: Never   • Drug use: Never   • Sexual activity: Defer       Vitals:  Ht 177.8 cm (70\")   Wt 112 kg (247 lb 0.4 oz)   BMI 35.44 kg/m²   T 98 P 75 R 14 /64 Sp02 98% (room air)          I/O (24Hr):  No intake or output data in the 24 " hours ending 07/16/22 0848    Labs and imaging:      Recent Results (from the past 12 hour(s))   POC Glucose Once    Collection Time: 07/15/22  9:21 PM    Specimen: Blood   Result Value Ref Range    Glucose 82 70 - 130 mg/dL   POC Glucose Once    Collection Time: 07/16/22  7:29 AM    Specimen: Blood   Result Value Ref Range    Glucose 191 (H) 70 - 130 mg/dL         Physical Examination:        Physical Exam  Vitals and nursing note reviewed.   Constitutional:       Appearance: He is well-developed.   HENT:      Head: Normocephalic and atraumatic.      Right Ear: External ear normal.      Left Ear: External ear normal.      Nose: Nose normal.      Mouth/Throat:      Mouth: Mucous membranes are dry.      Pharynx: Oropharynx is clear.   Eyes:      Pupils: Pupils are equal, round, and reactive to light.   Cardiovascular:      Rate and Rhythm: Normal rate. Rhythm irregular.      Pulses: Normal pulses.      Heart sounds: Normal heart sounds.   Pulmonary:      Effort: Pulmonary effort is normal.      Breath sounds: Normal breath sounds.   Abdominal:      General: Bowel sounds are normal.      Palpations: Abdomen is soft.      Comments: obese   Musculoskeletal:         General: Normal range of motion.      Cervical back: Normal range of motion and neck supple.      Comments: Generalized weakness   Skin:     General: Skin is warm and dry.      Comments: Staples intact   Neurological:      Mental Status: He is alert and oriented to person, place, and time.      Deep Tendon Reflexes: Reflexes are normal and symmetric.      Comments: Intermittent confusion   Psychiatric:         Behavior: Behavior normal.           Assessment:           * No active hospital problems. *    Past Medical History:   Diagnosis Date   • Brain tumor (HCC)    • Coronary artery disease    • COVID-19 vaccine series completed     MODERNA X 3; LAST DOSE 3/2022   • Diabetes (HCC)    • Erectile dysfunction    • GERD (gastroesophageal reflux disease)    •  Hypercholesteremia    • Hypertension         Plan:        1. Serratia marcescens epidural infection  2. Atrial fibrillation with RVR  3. DM2 with hyperglycemia on long term insulin  4. HTN  5. Obesity  6. HLD  7. GERD  8. Hypokalemia    Continue current treatment. Monitor counts. Increase activity. Labs Monday.  Aggressive therapies as tolerated. Maintain patient safety. Continue IV antibiotics under direction of ID. Continue heart rate and rhythm control efforts - resume home dose betapace and cardizem. Neurontin and norco resumed - will change norco to prn. Glycemic control - adjust insulin regimen. Continue tobacco cessation efforts and nicotine replacement.     Electronically signed by ADITYA Marino on 7/16/2022 at 08:48 CDT     I have discussed the care of Elijah Escobar, including pertinent history and exam findings, with the nurse practitioner.    I have seen and examined the patient and the key elements of all parts of the encounter have been performed by me.  I agree with the assessment, plan and orders as documented by ADITYA Cruz, after I modified the exam findings and the plan of treatments and the final version is my approved version of the assessment.        Electronically signed by Mono Madsen MD on 7/16/2022 at 21:02 CDT

## 2022-07-17 LAB
GLUCOSE BLDC GLUCOMTR-MCNC: 133 MG/DL (ref 70–130)
GLUCOSE BLDC GLUCOMTR-MCNC: 154 MG/DL (ref 70–130)
GLUCOSE BLDC GLUCOMTR-MCNC: 177 MG/DL (ref 70–130)
GLUCOSE BLDC GLUCOMTR-MCNC: 220 MG/DL (ref 70–130)

## 2022-07-17 PROCEDURE — 63710000001 INSULIN DETEMIR PER 5 UNITS: Performed by: NURSE PRACTITIONER

## 2022-07-17 PROCEDURE — 25010000002 ONDANSETRON PER 1 MG: Performed by: INTERNAL MEDICINE

## 2022-07-17 PROCEDURE — 97110 THERAPEUTIC EXERCISES: CPT

## 2022-07-17 PROCEDURE — 63710000001 INSULIN LISPRO (HUMAN) PER 5 UNITS: Performed by: INTERNAL MEDICINE

## 2022-07-17 PROCEDURE — 25010000002 CEFEPIME PER 500 MG: Performed by: INTERNAL MEDICINE

## 2022-07-17 PROCEDURE — 97116 GAIT TRAINING THERAPY: CPT

## 2022-07-17 PROCEDURE — 82962 GLUCOSE BLOOD TEST: CPT

## 2022-07-17 NOTE — CONSULTS
20 gauge 1.75 inch USGPIV placed in left forearm with 2 attempt(s). Initial attempt in right forearm unable to thread. Pt has clots in multiple veins of both arm with many noncompressible areas under ultrasound. PIV placed because the patient has a few days left of abx, per nursing, but should this IV fail and/or length of therapy increase, the vascular access team is out of options with his arms and an alternate route or cvc will be needed.

## 2022-07-17 NOTE — PROGRESS NOTES
"Infectious Diseases Progress Note    Patient:  Elijah Escobar  YOB: 1955  MRN: [id number]   Admit date: 7/11/2022   Admitting Physician: Mono Madsen MD  Primary Care Physician: Dylan Lama APRN    Chief Complaint/Interval History: He indicated he felt his mind was clearing.  Per discussion with nursing he continues to have some intermittent confusion.  It can come on and stop fairly abruptly.  He is not having headache.  He is not having any new neurological complaints.  He is without fever.  He is without diarrhea or rash.    No intake or output data in the 24 hours ending 07/17/22 7468  Allergies: No Known Allergies  Current Scheduled Medications:   aspirin, 81 mg, Oral, Daily  atorvastatin, 20 mg, Oral, Nightly  cefepime, 2 g, Intravenous, Q8H  dilTIAZem CD, 240 mg, Oral, Q24H  famotidine, 40 mg, Oral, Daily  gabapentin, 300 mg, Oral, TID  insulin detemir, 35 Units, Subcutaneous, Nightly  insulin lispro, 0-14 Units, Subcutaneous, 4x Daily AC & at Bedtime  isosorbide mononitrate, 60 mg, Oral, Q24H  levETIRAcetam, 500 mg, Oral, BID  lisinopril, 40 mg, Oral, Daily  metoprolol tartrate, 50 mg, Oral, Q12H  nicotine, 1 patch, Transdermal, Q24H  saccharomyces boulardii, 250 mg, Oral, BID  senna-docusate sodium, 2 tablet, Oral, Daily  sodium chloride, 10 mL, Intravenous, Q12H  sotalol, 120 mg, Oral, Q12H      Current PRN Medications:  •  acetaminophen **OR** acetaminophen  •  senna-docusate sodium **AND** polyethylene glycol **AND** bisacodyl **AND** bisacodyl  •  dextrose  •  dextrose  •  glucagon (human recombinant)  •  HYDROcodone-acetaminophen  •  labetalol  •  ondansetron **OR** ondansetron  •  sodium chloride    Review of Systems see HPI    Vital Signs:  Ht 177.8 cm (70\")   Wt 112 kg (247 lb 0.4 oz)   BMI 35.44 kg/m²     Physical Exam  Vital signs - reviewed.  Line/IV (peripheral) site -no induration or tenderness  Cranial nerves II through XII seem to be intact  Seems to be " moving all extremities equally  Craniotomy site without induration or drainage    Lab Results:  CBC: Results from last 7 days   Lab Units 07/14/22  0527 07/13/22  0502   WBC 10*3/mm3 8.27 8.16   HEMOGLOBIN g/dL 10.2* 10.8*   HEMATOCRIT % 30.7* 33.0*   PLATELETS 10*3/mm3 294 322     BMP:  Results from last 7 days   Lab Units 07/14/22  0527 07/13/22  0502   SODIUM mmol/L 138 140   POTASSIUM mmol/L 3.9 3.4*   CHLORIDE mmol/L 105 105   CO2 mmol/L 21.0* 24.0   BUN mg/dL 10 6*   CREATININE mg/dL 0.68* 0.72*   GLUCOSE mg/dL 166* 155*   CALCIUM mg/dL 8.4* 8.7   ALT (SGPT) U/L  --  10     Impression:   1.  Subgaleal/epidural infection with Serratia  2.  Acute kidney injury  3.  Diabetes mellitus  4.  Coronary artery disease    Recommendations:   Continue cefepime  No signs of antibiotic side effect  Continuing to plan cefepime through at least July 22, 2022  Will see again Tuesday or Wednesday  Please call in interim if new problems or additional questions for infectious diseases    Edwin Jeffers MD

## 2022-07-17 NOTE — PROGRESS NOTES
FARSHAD Salamanca APRN        Internal Medicine Progress Note    7/17/2022   11:13 CDT    Name:  Elijah Escobar  MRN:    [id number]     Acct:     [id number]   Room:  [address] [place] [address] Day: 0     Admit Date: 7/11/2022  3:17 PM  PCP: Dylan Lama APRN    Subjective:     C/C: weakness, confusion    Interval History: Status:  Improved. Resting in bed. No family at bedside. Woke from sleep. Afebrile. Progressing with therapies. 02 sats stable on room air.  Herat rates stable. Alert and oriented at present time.  Blood sugars improved.  C/o headache and right hip pain. Anxious for discharge.     Review of Systems   Constitutional: Positive for malaise/fatigue. Negative for chills, decreased appetite, weight gain and weight loss.   HENT: Negative for congestion, ear discharge, hoarse voice and tinnitus.    Eyes: Negative for blurred vision, discharge, visual disturbance and visual halos.   Cardiovascular: Negative for chest pain, claudication, dyspnea on exertion, irregular heartbeat, leg swelling, orthopnea and paroxysmal nocturnal dyspnea.   Respiratory: Negative for cough, shortness of breath, sputum production and wheezing.    Endocrine: Negative for cold intolerance, heat intolerance and polyuria.   Hematologic/Lymphatic: Negative for adenopathy. Does not bruise/bleed easily.   Skin: Negative for dry skin, itching and suspicious lesions.   Musculoskeletal: Negative for arthritis, back pain, falls, joint pain, muscle weakness and myalgias.   Gastrointestinal: Negative for abdominal pain, constipation, diarrhea, dysphagia and hematemesis.   Genitourinary: Negative for bladder incontinence, dysuria and frequency.   Neurological: Positive for headaches and weakness. Negative for aphonia, disturbances in coordination and dizziness.   Psychiatric/Behavioral: Negative for altered mental status, depression, memory loss and substance abuse. The patient does not have insomnia and is not  nervous/anxious.          Medications:     Allergies: No Known Allergies    Current Meds:   Current Facility-Administered Medications:   •  acetaminophen (TYLENOL) tablet 650 mg, 650 mg, Oral, Q4H PRN **OR** acetaminophen (TYLENOL) suppository 650 mg, 650 mg, Rectal, Q4H PRN, Mono Madsen MD  •  aspirin chewable tablet 81 mg, 81 mg, Oral, Daily, Mono Madsen MD  •  atorvastatin (LIPITOR) tablet 20 mg, 20 mg, Oral, Nightly, Mono Madsen MD  •  sennosides-docusate (PERICOLACE) 8.6-50 MG per tablet 2 tablet, 2 tablet, Oral, Daily **AND** polyethylene glycol (MIRALAX) packet 17 g, 17 g, Oral, Daily PRN **AND** bisacodyl (DULCOLAX) EC tablet 5 mg, 5 mg, Oral, Daily PRN **AND** bisacodyl (DULCOLAX) suppository 10 mg, 10 mg, Rectal, Daily PRN, Mono Madsen MD  •  cefepime (MAXIPIME) 2 g/100 mL 0.9% NS (mbp), 2 g, Intravenous, Q8H, Mono Madsen MD  •  dextrose (D50W) (25 g/50 mL) IV injection 25 g, 25 g, Intravenous, Q15 Min PRN, Mono Madsen MD  •  dextrose (GLUTOSE) oral gel 15 g, 15 g, Oral, Q15 Min PRN, Mono Madsen MD  •  dilTIAZem (CARDIZEM) 125 mg in 125 mL 0.7% sodium chloride  infusion, 5-15 mg/hr, Intravenous, Titrated, Mono Madsen MD  •  dilTIAZem CD (CARDIZEM CD) 24 hr capsule 240 mg, 240 mg, Oral, Q24H, Mono aMdsen MD  •  famotidine (PEPCID) tablet 40 mg, 40 mg, Oral, Daily, Mono Madsen MD  •  gabapentin (NEURONTIN) capsule 300 mg, 300 mg, Oral, TID, Mono Madsen MD  •  glucagon (human recombinant) (GLUCAGEN DIAGNOSTIC) injection 1 mg, 1 mg, Intramuscular, Q15 Min PRN, Mono Madsen MD  •  HYDROcodone-acetaminophen (NORCO)  MG per tablet 1 tablet, 1 tablet, Oral, 4x Daily PRN, Latoya Solorio, ADITYA  •  insulin detemir (LEVEMIR) injection 35 Units, 35 Units, Subcutaneous, Nightly, Latoya Solorio, ADITYA  •  Insulin Lispro (humaLOG) injection 0-14 Units, 0-14 Units,  "Subcutaneous, 4x Daily AC & at Bedtime, Mono Madsen MD  •  isosorbide mononitrate (IMDUR) 24 hr tablet 60 mg, 60 mg, Oral, Q24H, Mono Madsen MD  •  labetalol (NORMODYNE,TRANDATE) injection 10 mg, 10 mg, Intravenous, Q6H PRN, Mono Madsen MD  •  levETIRAcetam (KEPPRA) tablet 500 mg, 500 mg, Oral, BID, Mono Madsen MD  •  lisinopril (PRINIVIL,ZESTRIL) tablet 40 mg, 40 mg, Oral, Daily, Mono Madsen MD  •  metoprolol tartrate (LOPRESSOR) tablet 50 mg, 50 mg, Oral, Q12H, Mono Madsen MD  •  nicotine (NICODERM CQ) 14 MG/24HR patch 1 patch, 1 patch, Transdermal, Q24H, Latoya Solorio APRN  •  ondansetron (ZOFRAN) tablet 4 mg, 4 mg, Oral, Q6H PRN **OR** ondansetron (ZOFRAN) injection 4 mg, 4 mg, Intravenous, Q6H PRN, Mono Madsen MD  •  saccharomyces boulardii (FLORASTOR) capsule 250 mg, 250 mg, Oral, BID, Mono Madsen MD  •  sodium chloride 0.9 % flush 10 mL, 10 mL, Intravenous, Q12H, Mono Madsen MD  •  sodium chloride 0.9 % flush 10 mL, 10 mL, Intravenous, PRN, Mono Madsen MD  •  sotalol (BETAPACE) tablet 120 mg, 120 mg, Oral, Q12H, Mono Madsen MD    Data:     Code Status:    There are no questions and answers to display.       Family History   Problem Relation Age of Onset   • Cancer Mother         liver   • Heart disease Father        Social History     Socioeconomic History   • Marital status:    Tobacco Use   • Smoking status: Current Every Day Smoker     Packs/day: 0.50   • Smokeless tobacco: Never Used   Vaping Use   • Vaping Use: Never used   Substance and Sexual Activity   • Alcohol use: Never   • Drug use: Never   • Sexual activity: Defer       Vitals:  Ht 177.8 cm (70\")   Wt 112 kg (247 lb 0.4 oz)   BMI 35.44 kg/m²   T 98.6 P 66 R 18 /68 Sp02 96% (room air)          I/O (24Hr):  No intake or output data in the 24 hours ending 07/17/22 1113    Labs and imaging:      Recent " Results (from the past 12 hour(s))   POC Glucose Once    Collection Time: 07/17/22  8:25 AM    Specimen: Blood   Result Value Ref Range    Glucose 177 (H) 70 - 130 mg/dL         Physical Examination:        Physical Exam  Vitals and nursing note reviewed.   Constitutional:       Appearance: He is well-developed.   HENT:      Head: Normocephalic and atraumatic.      Right Ear: External ear normal.      Left Ear: External ear normal.      Nose: Nose normal.      Mouth/Throat:      Mouth: Mucous membranes are dry.      Pharynx: Oropharynx is clear.   Eyes:      Pupils: Pupils are equal, round, and reactive to light.   Cardiovascular:      Rate and Rhythm: Normal rate. Rhythm irregular.      Pulses: Normal pulses.      Heart sounds: Normal heart sounds.   Pulmonary:      Effort: Pulmonary effort is normal.      Breath sounds: Normal breath sounds.   Abdominal:      General: Bowel sounds are normal.      Palpations: Abdomen is soft.      Comments: obese   Musculoskeletal:         General: Normal range of motion.      Cervical back: Normal range of motion and neck supple.      Comments: Generalized weakness   Skin:     General: Skin is warm and dry.      Comments: Staples intact   Neurological:      Mental Status: He is alert and oriented to person, place, and time.      Deep Tendon Reflexes: Reflexes are normal and symmetric.      Comments: Intermittent confusion   Psychiatric:         Behavior: Behavior normal.           Assessment:           * No active hospital problems. *    Past Medical History:   Diagnosis Date   • Brain tumor (HCC)    • Coronary artery disease    • COVID-19 vaccine series completed     MODERNA X 3; LAST DOSE 3/2022   • Diabetes (HCC)    • Erectile dysfunction    • GERD (gastroesophageal reflux disease)    • Hypercholesteremia    • Hypertension         Plan:        1. Serratia marcescens epidural infection  2. Atrial fibrillation with RVR  3. DM2 with hyperglycemia on long term  insulin  4. HTN  5. Obesity  6. HLD  7. GERD  8. Hypokalemia    Continue current treatment. Monitor counts. Increase activity. Labs in am.  Aggressive therapies as tolerated. Maintain patient safety. Continue IV antibiotics under direction of ID. Continue heart rate and rhythm control efforts - continue home dose betapace and cardizem. Continue pain control efforts.  Glycemic control - adjust insulin regimen. Continue tobacco cessation efforts and nicotine replacement.     Electronically signed by ADITYA Marino on 7/17/2022 at 11:13 CDT

## 2022-07-18 LAB
GLUCOSE BLDC GLUCOMTR-MCNC: 118 MG/DL (ref 70–130)
GLUCOSE BLDC GLUCOMTR-MCNC: 186 MG/DL (ref 70–130)
GLUCOSE BLDC GLUCOMTR-MCNC: 194 MG/DL (ref 70–130)
GLUCOSE BLDC GLUCOMTR-MCNC: 197 MG/DL (ref 70–130)

## 2022-07-18 PROCEDURE — 97116 GAIT TRAINING THERAPY: CPT

## 2022-07-18 PROCEDURE — 25010000002 CEFEPIME PER 500 MG: Performed by: INTERNAL MEDICINE

## 2022-07-18 PROCEDURE — 97535 SELF CARE MNGMENT TRAINING: CPT

## 2022-07-18 PROCEDURE — 63710000001 INSULIN DETEMIR PER 5 UNITS: Performed by: NURSE PRACTITIONER

## 2022-07-18 PROCEDURE — 63710000001 INSULIN LISPRO (HUMAN) PER 5 UNITS: Performed by: INTERNAL MEDICINE

## 2022-07-18 PROCEDURE — 82962 GLUCOSE BLOOD TEST: CPT

## 2022-07-18 RX ORDER — ZIPRASIDONE MESYLATE 20 MG/ML
5 INJECTION, POWDER, LYOPHILIZED, FOR SOLUTION INTRAMUSCULAR DAILY PRN
Status: DISCONTINUED | OUTPATIENT
Start: 2022-07-18 | End: 2022-07-25 | Stop reason: HOSPADM

## 2022-07-18 NOTE — PROGRESS NOTES
FARSHAD Salamanca APRN        Internal Medicine Progress Note    7/18/2022   10:47 CDT    Name:  Elijah Escobar  MRN:    [id number]     Acct:     [id number]   Room:  [facility] Day: 0     Admit Date: 7/11/2022  3:17 PM  PCP: Dylan Lama APRN    Subjective:     C/C: weakness, confusion    Interval History: Status:  Improved. Ambulating in hallway with therapies.  No family at bedside.  Afebrile. Progressing with therapies. 02 sats stable on room air.  Herat rates stable. Alert and oriented at present time.  Blood sugars improved.  Anxious for discharge.     Review of Systems   Constitutional: Positive for malaise/fatigue. Negative for chills, decreased appetite, weight gain and weight loss.   HENT: Negative for congestion, ear discharge, hoarse voice and tinnitus.    Eyes: Negative for blurred vision, discharge, visual disturbance and visual halos.   Cardiovascular: Negative for chest pain, claudication, dyspnea on exertion, irregular heartbeat, leg swelling, orthopnea and paroxysmal nocturnal dyspnea.   Respiratory: Negative for cough, shortness of breath, sputum production and wheezing.    Endocrine: Negative for cold intolerance, heat intolerance and polyuria.   Hematologic/Lymphatic: Negative for adenopathy. Does not bruise/bleed easily.   Skin: Negative for dry skin, itching and suspicious lesions.   Musculoskeletal: Negative for arthritis, back pain, falls, joint pain, muscle weakness and myalgias.   Gastrointestinal: Negative for abdominal pain, constipation, diarrhea, dysphagia and hematemesis.   Genitourinary: Negative for bladder incontinence, dysuria and frequency.   Neurological: Positive for headaches and weakness. Negative for aphonia, disturbances in coordination and dizziness.   Psychiatric/Behavioral: Negative for altered mental status, depression, memory loss and substance abuse. The patient does not have insomnia and is not nervous/anxious.           Medications:     Allergies: No Known Allergies    Current Meds:   Current Facility-Administered Medications:   •  acetaminophen (TYLENOL) tablet 650 mg, 650 mg, Oral, Q4H PRN **OR** acetaminophen (TYLENOL) suppository 650 mg, 650 mg, Rectal, Q4H PRN, Mono Madsen MD  •  aspirin chewable tablet 81 mg, 81 mg, Oral, Daily, Mono Madsen MD  •  atorvastatin (LIPITOR) tablet 20 mg, 20 mg, Oral, Nightly, Mono Madsen MD  •  sennosides-docusate (PERICOLACE) 8.6-50 MG per tablet 2 tablet, 2 tablet, Oral, Daily **AND** polyethylene glycol (MIRALAX) packet 17 g, 17 g, Oral, Daily PRN **AND** bisacodyl (DULCOLAX) EC tablet 5 mg, 5 mg, Oral, Daily PRN **AND** bisacodyl (DULCOLAX) suppository 10 mg, 10 mg, Rectal, Daily PRN, Mono Madsen MD  •  cefepime (MAXIPIME) 2 g/100 mL 0.9% NS (mbp), 2 g, Intravenous, Q8H, Mono Madsen MD  •  dextrose (D50W) (25 g/50 mL) IV injection 25 g, 25 g, Intravenous, Q15 Min PRN, Mono Madsen MD  •  dextrose (GLUTOSE) oral gel 15 g, 15 g, Oral, Q15 Min PRN, Mono Madsen MD  •  dilTIAZem (CARDIZEM) 125 mg in 125 mL 0.7% sodium chloride  infusion, 5-15 mg/hr, Intravenous, Titrated, Mono Madsen MD  •  dilTIAZem CD (CARDIZEM CD) 24 hr capsule 240 mg, 240 mg, Oral, Q24H, Mono Madsen MD  •  famotidine (PEPCID) tablet 40 mg, 40 mg, Oral, Daily, Mono Madsen MD  •  gabapentin (NEURONTIN) capsule 300 mg, 300 mg, Oral, TID, Mono Madsen MD  •  glucagon (human recombinant) (GLUCAGEN DIAGNOSTIC) injection 1 mg, 1 mg, Intramuscular, Q15 Min PRN, Mono Madsen MD  •  HYDROcodone-acetaminophen (NORCO)  MG per tablet 1 tablet, 1 tablet, Oral, 4x Daily PRN, Latoya Solorio, ADITYA  •  insulin detemir (LEVEMIR) injection 35 Units, 35 Units, Subcutaneous, Nightly, Latoya Solorio, ADITYA  •  Insulin Lispro (humaLOG) injection 0-14 Units, 0-14 Units, Subcutaneous, 4x Daily AC & at  "Bedtime, Mono Madsen MD  •  isosorbide mononitrate (IMDUR) 24 hr tablet 60 mg, 60 mg, Oral, Q24H, Mono Madsen MD  •  labetalol (NORMODYNE,TRANDATE) injection 10 mg, 10 mg, Intravenous, Q6H PRN, Mono Madsen MD  •  levETIRAcetam (KEPPRA) tablet 500 mg, 500 mg, Oral, BID, Mono Madsen MD  •  lisinopril (PRINIVIL,ZESTRIL) tablet 40 mg, 40 mg, Oral, Daily, Mono Madsen MD  •  metoprolol tartrate (LOPRESSOR) tablet 50 mg, 50 mg, Oral, Q12H, Mono Madsen MD  •  nicotine (NICODERM CQ) 14 MG/24HR patch 1 patch, 1 patch, Transdermal, Q24H, Latoya Solorio APRN  •  ondansetron (ZOFRAN) tablet 4 mg, 4 mg, Oral, Q6H PRN **OR** ondansetron (ZOFRAN) injection 4 mg, 4 mg, Intravenous, Q6H PRN, Mono Madsen MD  •  saccharomyces boulardii (FLORASTOR) capsule 250 mg, 250 mg, Oral, BID, Mono Madsen MD  •  sodium chloride 0.9 % flush 10 mL, 10 mL, Intravenous, Q12H, Mono Madsen MD  •  sodium chloride 0.9 % flush 10 mL, 10 mL, Intravenous, PRN, Mono Madsen MD  •  sotalol (BETAPACE) tablet 120 mg, 120 mg, Oral, Q12H, Mono Madsen MD    Data:     Code Status:    There are no questions and answers to display.       Family History   Problem Relation Age of Onset   • Cancer Mother         liver   • Heart disease Father        Social History     Socioeconomic History   • Marital status:    Tobacco Use   • Smoking status: Current Every Day Smoker     Packs/day: 0.50   • Smokeless tobacco: Never Used   Vaping Use   • Vaping Use: Never used   Substance and Sexual Activity   • Alcohol use: Never   • Drug use: Never   • Sexual activity: Defer       Vitals:  Ht 177.8 cm (70\")   Wt 116 kg (256 lb 8 oz)   BMI 36.80 kg/m²   T 97.6 P 81 R 14 /59 Sp02 93% (room air)          I/O (24Hr):  No intake or output data in the 24 hours ending 07/18/22 1047    Labs and imaging:      Recent Results (from the past 12 hour(s)) "   POC Glucose Once    Collection Time: 07/18/22  7:09 AM    Specimen: Blood   Result Value Ref Range    Glucose 118 70 - 130 mg/dL         Physical Examination:        Physical Exam  Vitals and nursing note reviewed.   Constitutional:       Appearance: He is well-developed.   HENT:      Head: Normocephalic and atraumatic.      Right Ear: External ear normal.      Left Ear: External ear normal.      Nose: Nose normal.      Mouth/Throat:      Mouth: Mucous membranes are dry.      Pharynx: Oropharynx is clear.   Eyes:      Pupils: Pupils are equal, round, and reactive to light.   Cardiovascular:      Rate and Rhythm: Normal rate. Rhythm irregular.      Pulses: Normal pulses.      Heart sounds: Normal heart sounds.   Pulmonary:      Effort: Pulmonary effort is normal.      Breath sounds: Normal breath sounds.   Abdominal:      General: Bowel sounds are normal.      Palpations: Abdomen is soft.      Comments: obese   Musculoskeletal:         General: Normal range of motion.      Cervical back: Normal range of motion and neck supple.      Comments: Generalized weakness   Skin:     General: Skin is warm and dry.      Comments: Staples intact   Neurological:      Mental Status: He is alert and oriented to person, place, and time.      Deep Tendon Reflexes: Reflexes are normal and symmetric.      Comments: Intermittent confusion   Psychiatric:         Behavior: Behavior normal.           Assessment:           * No active hospital problems. *    Past Medical History:   Diagnosis Date   • Brain tumor (HCC)    • Coronary artery disease    • COVID-19 vaccine series completed     MODERNA X 3; LAST DOSE 3/2022   • Diabetes (HCC)    • Erectile dysfunction    • GERD (gastroesophageal reflux disease)    • Hypercholesteremia    • Hypertension         Plan:        1. Serratia marcescens epidural infection  2. Atrial fibrillation with RVR  3. DM2 with hyperglycemia on long term  insulin  4. HTN  5. Obesity  6. HLD  7. GERD  8. Hypokalemia    Continue current treatment. Monitor counts. Increase activity. Labs Thursday.  Aggressive therapies as tolerated. Maintain patient safety. Continue IV antibiotics under direction of ID. Continue heart rate and rhythm control efforts - continue home dose betapace and cardizem. Continue pain control efforts.  Glycemic control - adjust insulin regimen. Continue tobacco cessation efforts and nicotine replacement.     Electronically signed by ADITYA Marino on 7/18/2022 at 10:47 CDT

## 2022-07-19 LAB
CRP SERPL-MCNC: 0.86 MG/DL (ref 0–0.5)
ERYTHROCYTE [SEDIMENTATION RATE] IN BLOOD: 56 MM/HR (ref 0–20)
GLUCOSE BLDC GLUCOMTR-MCNC: 132 MG/DL (ref 70–130)
GLUCOSE BLDC GLUCOMTR-MCNC: 154 MG/DL (ref 70–130)
GLUCOSE BLDC GLUCOMTR-MCNC: 157 MG/DL (ref 70–130)
GLUCOSE BLDC GLUCOMTR-MCNC: 162 MG/DL (ref 70–130)

## 2022-07-19 PROCEDURE — 25010000002 CEFEPIME PER 500 MG: Performed by: INTERNAL MEDICINE

## 2022-07-19 PROCEDURE — 82962 GLUCOSE BLOOD TEST: CPT

## 2022-07-19 PROCEDURE — 85652 RBC SED RATE AUTOMATED: CPT | Performed by: NURSE PRACTITIONER

## 2022-07-19 PROCEDURE — 99024 POSTOP FOLLOW-UP VISIT: CPT | Performed by: NURSE PRACTITIONER

## 2022-07-19 PROCEDURE — 63710000001 INSULIN LISPRO (HUMAN) PER 5 UNITS: Performed by: INTERNAL MEDICINE

## 2022-07-19 PROCEDURE — 97530 THERAPEUTIC ACTIVITIES: CPT

## 2022-07-19 PROCEDURE — 86140 C-REACTIVE PROTEIN: CPT | Performed by: NURSE PRACTITIONER

## 2022-07-19 PROCEDURE — 97116 GAIT TRAINING THERAPY: CPT

## 2022-07-19 PROCEDURE — 63710000001 INSULIN DETEMIR PER 5 UNITS: Performed by: NURSE PRACTITIONER

## 2022-07-19 NOTE — PROGRESS NOTES
Adult Nutrition  Assessment/PES    Patient Name:  Elijah Escobar  YOB: 1955  MRN: [id number]  Admit Date:  7/11/2022    Assessment Date:  7/19/2022    Comments:  Ntn follow up. Appetite has declined this week. He reports a fair intake. Recommend to start Boost gluc control BID to supplement current intake, encourage intake, advised of alternate selections.      Reason for Assessment     Row Name 07/19/22 1503          Reason for Assessment    Reason For Assessment follow-up protocol                Nutrition/Diet History     Row Name 07/19/22 1504          Nutrition/Diet History    Typical Intake (Food/Fluid/EN/PN) He seems more oriented today. He reports a fair appetite to this RD today.     Factors Affecting Nutritional Intake cognitive status/motor function                Anthropometrics     Row Name 07/19/22 1507          Anthropometrics    Weight for Calculation 116 kg (255 lb 11.7 oz)                Labs/Tests/Procedures/Meds     Row Name 07/19/22 1505          Labs/Procedures/Meds    Lab Results Reviewed reviewed, pertinent     Lab Results Comments sed rate, CRP, Cr, H/H            Diagnostic Tests/Procedures    Diagnostic Test/Procedure Reviewed reviewed            Medications    Pertinent Medications Reviewed reviewed                Physical Findings     Row Name 07/19/22 1505          Physical Findings    Overall Physical Appearance No BM noted since 7/12, alert with confusion, scalp incision                Estimated/Assessed Needs - Anthropometrics     Row Name 07/19/22 1507          Anthropometrics    Weight for Calculation 116 kg (255 lb 11.7 oz)            Estimated/Assessed Needs    Additional Documentation Protein Requirements (Group);Fluid Requirements (Group);KCAL/KG (Group)            KCAL/KG    KCAL/KG 20 Kcal/Kg (kcal);25 Kcal/Kg (kcal)     20 Kcal/Kg (kcal) 2320     25 Kcal/Kg (kcal) 2900            Protein Requirements    Weight Used For Protein Calculations 116 kg (255 lb  11.7 oz)     Est Protein Requirement Amount (gms/kg) 1.0 gm protein     Estimated Protein Requirements (gms/day) 116            Fluid Requirements    Fluid Requirements (mL/day) 2320     Estimated Fluid Requirement Method RDA Method     RDA Method (mL) 2320                Nutrition Prescription Ordered     Row Name 07/19/22 1508          Nutrition Prescription PO    Current PO Diet Regular     Fluid Consistency Thin     Common Modifiers Cardiac;Consistent Carbohydrate                Evaluation of Received Nutrient/Fluid Intake     Row Name 07/19/22 1508          Nutrient/Fluid Evaluation    Number of Days Evaluated 3 days     Additional Documentation Fluid Intake Evaluation (Group);Intake Assessment (Group)            Fluid Intake Evaluation    Oral Fluid (mL) 1140     Other Fluid (mL) 243            PO Evaluation    Number of Days PO Intake Evaluated 3 days     Number of Meals 5     % PO Intake 23                     Problem/Interventions:   Problem 1     Row Name 07/19/22 1509          Nutrition Diagnoses Problem 1    Problem 1 Malnutrition     Etiology (related to) Medical Diagnosis;Factors Affecting Nutrition     Neurological Craniotomy  for a tumor     Appetite Poor at this Time     Signs/Symptoms (evidenced by) Report/Observation;Unintended Weight Change;Other (comment)  Per MSA     Percent (%) intake recorded 23 %     Over number of meals 5     Unintended Weight Change Loss     Number of Pounds Lost 19 lbs (6.8%)     Weight loss time period < 1 month with areas of muscle/fat wasting  per report                      Intervention Goal     Row Name 07/19/22 1510          Intervention Goal    General Reduce/improve symptoms;Meet nutritional needs for age/condition;Disease management/therapy     PO Increase intake;Meet estimated needs     Weight No significant weight loss                Nutrition Intervention     Row Name 07/19/22 1510          Nutrition Intervention    RD/Tech Action Follow Tx progress;Care plan  reviewd;Encourage intake;Recommend/ordered;Advise alternate selection     Recommended/Ordered Supplement                Nutrition Prescription     Row Name 07/19/22 1510          Nutrition Prescription PO    PO Prescription Begin/change supplement     Supplement Boost Glucose Control     Supplement Frequency 2 times a day     New PO Prescription Ordered? Yes                Education/Evaluation     Row Name 07/19/22 1511          Education    Education No discharge needs identified at this time            Monitor/Evaluation    Monitor Per protocol                 Electronically signed by:  Pina Whitt RDN, RIGO  07/19/22 15:11 CDT

## 2022-07-19 NOTE — PROGRESS NOTES
NEUROSURGERY DAILY PROGRESS NOTE    ASSESSMENT:   Elijah Escobar is a 66 y.o. with a significant medical history of recent craniotomy for tumor (6/16/2022), incision and washout and craniectomy (7/1/2022), coronary artery disease, A. fib, diabetes, erectile dysfunction, GERD, hypertension, hyperlipidemia.  He presents with a new problem of a persistent right sided headache, particularly over the right temporal region and dizziness with standing.  Physical exam findings of tense but fluctuant area of edema from the mid frontal to right parietal region of the scalp, the area is tender to palpation, periorbital lesions with purulent drainage from the left eye, scalp incision is clean, dry, and intact, bright and awake, oriented x3, speech is clear, follows commands without prompting showing thumbs up and 2 fingers bilaterally, no pronator drift, left dysmetria, gross hyporeflexia, and bilateral Babinski.  CT of the head from 6/23/2022, showing expected postsurgical changes to the right frontal lobe, small amount of fluid collection and pneumocephalus in the right subdural space, small amount of vasogenic edema with 4.3 mm left-to-right midline shift.  Repeat CT of the head obtained 6/30/2022 shows no acute intracranial blood products, unresolved vasogenic edema, midline shift resolved, increased left frontal and temporal soft tissue swelling.  MRI of the brain from 6/30/2022 shows a 7.9 x 6.5 x 0.9 cm rim-enhancing fluid collection overlying the bone flap which was postoperatively found to be infection.    Past Medical History:   Diagnosis Date   • Brain tumor (HCC)    • Coronary artery disease    • COVID-19 vaccine series completed     MODERNA X 3; LAST DOSE 3/2022   • Diabetes (HCC)    • Erectile dysfunction    • GERD (gastroesophageal reflux disease)    • Hypercholesteremia    • Hypertension      There are no active hospital problems to display for this patient.    HPI: Resting comfortably.  No complaints at  "present.  No acute events overnight.    PLAN:   Neuro: Stable.  Bright and awake.  Oriented x3.  Follows commands without prompting showing thumbs up and 2 fingers bilaterally.      Continue neurochecks per policy.  Call for decline   Cranial helmet on at all times while out of bed   Continue Keppra   Continue antibiotics per ID   Continue weekly CRP and ESR   Strict glucose control.   Neurosurgery will continue to follow.    CHIEF COMPLAINT:   Right sided headache and intermittent dizziness     Subjective  Symptom stable.  Doing well       Objective:  General Appearance:  Well-appearing and in no acute distress.    Vital signs: (most recent): Height 177.8 cm (70\"), weight 116 kg (256 lb 8 oz).      Neurologic Exam     Mental Status   Oriented to person, place, and time.   Attention: normal. Concentration: normal.   Speech: speech is normal   Level of consciousness: alert    Bright and awake.  Oriented x3.  Follows commands without prompting showing thumbs up and 2 fingers bilaterally.     Cranial Nerves     CN II   Visual fields full to confrontation.     CN III, IV, VI   Pupils are equal, round, and reactive to light.  Extraocular motions are normal.     CN V   Facial sensation intact.     CN VII   Facial expression full, symmetric.     CN VIII   CN VIII normal.     CN IX, X   CN IX normal.     CN XI   CN XI normal.     Motor Exam   Right arm tone: normal  Left arm tone: normal  Right leg tone: normal  Left leg tone: normal    Strength   Right deltoid: 5/5  Left deltoid: 5/5  Right biceps: 5/5  Left biceps: 5/5  Right triceps: 5/5  Left triceps: 5/5  Right wrist extension: 5/5  Left wrist extension: 5/5  Right iliopsoas: 5/5  Left iliopsoas: 5/5  Right quadriceps: 5/5  Left quadriceps: 5/5  Right anterior tibial: 5/5  Left anterior tibial: 5/5  Right gastroc: 5/5  Left gastroc: 5/5  Right EHL 5/5  Left EHL 5/5       Sensory Exam   Right arm light touch: normal  Left arm light touch: normal  Right leg light touch: " normal  Left leg light touch: normal    Gait, Coordination, and Reflexes     Tremor   Resting tremor: absent  Intention tremor: absent  Action tremor: absent    Scalp incision: C, D, I    Drains:   [REMOVED] Closed/Suction Drain 1 Scalp Bulb (Removed)   Site Description Unable to view 06/19/22 0735   Dressing Status Clean;Dry;Intact 06/19/22 0735   Drainage Appearance Serosanguineous 06/19/22 0735   Status To bulb suction 06/19/22 0735   Output (mL) 5 mL 06/19/22 0723       [REMOVED] Urethral Catheter Non-latex;Silicone 16 Fr. (Removed)   Daily Indications Selected surgeries ( tract, abdomen) 06/16/22 1339   Site Assessment Clean;Skin intact 06/16/22 1339   Collection Container Standard drainage bag 06/16/22 1339   Securement Method Securing device 06/16/22 1339   Output (mL) 1100 mL 06/16/22 1233       Imaging Results (Last 24 Hours)     Procedure Component Value Units Date/Time    US Venous Doppler Upper Extremity Bilateral (duplex) [[id number]] Collected: 07/18/22 1454     Updated: 07/18/22 1458    Narrative:      History: Pain and swelling       Impression:      Impression:  1. There is evidence of deep venous thrombosis in bilateral upper  extremities.  2. There is evidence of superficial thrombus in the cephalic veins in  bilateral upper extremities.     Comments: Bilateral upper extremity venous duplex exam was performed  using color Doppler flow, Doppler wave form analysis, and grayscale  imaging, with and without compression. There is evidence of deep venous  thrombosis in the brachial vein in the right upper extremity. There is  also deep venous thrombosis in the radial vein in the left upper  extremity. There is superficial thrombus in the cephalic veins in  bilateral upper extremities.        This report was finalized on 07/18/2022 14:55 by Dr. Asif Fry MD.        Lab Results (last 24 hours)     Procedure Component Value Units Date/Time    POC Glucose Once [[id number]]  (Abnormal) Collected:  07/19/22 0715    Specimen: Blood Updated: 07/19/22 0727     Glucose 154 mg/dL      Comment: : NIC Ferrera ID: [id number]       POC Glucose Once [[id number]]  (Abnormal) Collected: 07/18/22 2034    Specimen: Blood Updated: 07/18/22 2046     Glucose 186 mg/dL      Comment: : KEITH Gala Law OksanaMeter ID: [id number]       POC Glucose Once [[id number]]  (Abnormal) Collected: 07/18/22 1615    Specimen: Blood Updated: 07/18/22 1626     Glucose 197 mg/dL      Comment: : MALLDAY2 Allday Andi EscobedoMeter ID: [id number]       POC Glucose Once [[id number]]  (Abnormal) Collected: 07/18/22 1108    Specimen: Blood Updated: 07/18/22 1119     Glucose 194 mg/dL      Comment: : MALLDAY2 Allday Andi OttelleMeter ID: [id number]           [id number]  Rahul Arnold, APRN

## 2022-07-19 NOTE — PROGRESS NOTES
[facility] Fall Risk Assessment Note    Name: Elijah Escobar  MRN: [id number]  CSN: [id number]  Room: [facility]  Admit Date/Time: 7/11/2022  3:17 PM.    Currently ordered medications associated with an increased risk for fall include:    Scheduled Meds:  dilTIAZem CD, 240 mg, Oral, Q24H  gabapentin, 300 mg, Oral, TID  insulin detemir, 35 Units, Subcutaneous, Nightly  insulin lispro, 0-14 Units, Subcutaneous, 4x Daily AC & at Bedtime  isosorbide mononitrate, 60 mg, Oral, Q24H  levETIRAcetam, 500 mg, Oral, BID  lisinopril, 40 mg, Oral, Daily  metoprolol tartrate, 50 mg, Oral, Q12H  senna-docusate sodium, 2 tablet, Oral, Daily  sotalol, 120 mg, Oral, Q12H        Continuous Infusions:dilTIAZem, 5-15 mg/hr        PRN Meds:  •  senna-docusate sodium **AND** polyethylene glycol **AND** bisacodyl **AND** bisacodyl  •  HYDROcodone-acetaminophen  •  labetalol  •  ondansetron   •  ziprasidone    Kevin Wang, Pharmacy Intern  07/19/22 14:33 CDT

## 2022-07-20 ENCOUNTER — APPOINTMENT (OUTPATIENT)
Dept: MRI IMAGING | Facility: HOSPITAL | Age: 67
End: 2022-07-20

## 2022-07-20 LAB
GLUCOSE BLDC GLUCOMTR-MCNC: 114 MG/DL (ref 70–130)
GLUCOSE BLDC GLUCOMTR-MCNC: 145 MG/DL (ref 70–130)
GLUCOSE BLDC GLUCOMTR-MCNC: 176 MG/DL (ref 70–130)
GLUCOSE BLDC GLUCOMTR-MCNC: 200 MG/DL (ref 70–130)

## 2022-07-20 PROCEDURE — 97535 SELF CARE MNGMENT TRAINING: CPT

## 2022-07-20 PROCEDURE — 97116 GAIT TRAINING THERAPY: CPT

## 2022-07-20 PROCEDURE — 97530 THERAPEUTIC ACTIVITIES: CPT

## 2022-07-20 PROCEDURE — 63710000001 INSULIN LISPRO (HUMAN) PER 5 UNITS: Performed by: INTERNAL MEDICINE

## 2022-07-20 PROCEDURE — 25010000002 CEFEPIME PER 500 MG: Performed by: INTERNAL MEDICINE

## 2022-07-20 PROCEDURE — 63710000001 INSULIN DETEMIR PER 5 UNITS: Performed by: NURSE PRACTITIONER

## 2022-07-20 PROCEDURE — 82962 GLUCOSE BLOOD TEST: CPT

## 2022-07-20 NOTE — PROGRESS NOTES
FARSHAD Salamanca APRN        Internal Medicine Progress Note    7/20/2022   10:46 CDT    Name:  Elijah Escobar  MRN:    [id number]     Acct:     [id number]   Room:  [facility] Day: 0     Admit Date: 7/11/2022  3:17 PM  PCP: Dylan Lama APRN    Subjective:     C/C: weakness, confusion    Interval History: Status:  Improved. Up to side of bed.  No family at bedside.  Afebrile. Progressing with therapies. 02 sats stable on room air.  Herat rates stable. Alert and oriented at present time.  Blood sugars improved.  Anxious for discharge.     Review of Systems   Constitutional: Positive for malaise/fatigue. Negative for chills, decreased appetite, weight gain and weight loss.   HENT: Negative for congestion, ear discharge, hoarse voice and tinnitus.    Eyes: Negative for blurred vision, discharge, visual disturbance and visual halos.   Cardiovascular: Negative for chest pain, claudication, dyspnea on exertion, irregular heartbeat, leg swelling, orthopnea and paroxysmal nocturnal dyspnea.   Respiratory: Negative for cough, shortness of breath, sputum production and wheezing.    Endocrine: Negative for cold intolerance, heat intolerance and polyuria.   Hematologic/Lymphatic: Negative for adenopathy. Does not bruise/bleed easily.   Skin: Negative for dry skin, itching and suspicious lesions.   Musculoskeletal: Negative for arthritis, back pain, falls, joint pain, muscle weakness and myalgias.   Gastrointestinal: Negative for abdominal pain, constipation, diarrhea, dysphagia and hematemesis.   Genitourinary: Negative for bladder incontinence, dysuria and frequency.   Neurological: Positive for headaches and weakness. Negative for aphonia, disturbances in coordination and dizziness.   Psychiatric/Behavioral: Negative for altered mental status, depression, memory loss and substance abuse. The patient does not have insomnia and is not nervous/anxious.          Medications:      Allergies: No Known Allergies    Current Meds:   Current Facility-Administered Medications:   •  acetaminophen (TYLENOL) tablet 650 mg, 650 mg, Oral, Q4H PRN **OR** acetaminophen (TYLENOL) suppository 650 mg, 650 mg, Rectal, Q4H PRN, Mono Madsen MD  •  aspirin chewable tablet 81 mg, 81 mg, Oral, Daily, Mono Madsen MD  •  atorvastatin (LIPITOR) tablet 20 mg, 20 mg, Oral, Nightly, Mono Madsen MD  •  sennosides-docusate (PERICOLACE) 8.6-50 MG per tablet 2 tablet, 2 tablet, Oral, Daily **AND** polyethylene glycol (MIRALAX) packet 17 g, 17 g, Oral, Daily PRN **AND** bisacodyl (DULCOLAX) EC tablet 5 mg, 5 mg, Oral, Daily PRN **AND** bisacodyl (DULCOLAX) suppository 10 mg, 10 mg, Rectal, Daily PRN, Mono Madsen MD  •  cefepime (MAXIPIME) 2 g/100 mL 0.9% NS (mbp), 2 g, Intravenous, Q8H, Mono Madsen MD  •  dextrose (D50W) (25 g/50 mL) IV injection 25 g, 25 g, Intravenous, Q15 Min PRN, Mono Madsen MD  •  dextrose (GLUTOSE) oral gel 15 g, 15 g, Oral, Q15 Min PRN, Mono Madsen MD  •  dilTIAZem (CARDIZEM) 125 mg in 125 mL 0.7% sodium chloride  infusion, 5-15 mg/hr, Intravenous, Titrated, Mono Madsen MD  •  dilTIAZem CD (CARDIZEM CD) 24 hr capsule 240 mg, 240 mg, Oral, Q24H, Mono Madsen MD  •  famotidine (PEPCID) tablet 40 mg, 40 mg, Oral, Daily, Mono Madsen MD  •  gabapentin (NEURONTIN) capsule 300 mg, 300 mg, Oral, TID, Mono Madsen MD  •  glucagon (human recombinant) (GLUCAGEN DIAGNOSTIC) injection 1 mg, 1 mg, Intramuscular, Q15 Min PRN, oMno Madsen MD  •  HYDROcodone-acetaminophen (NORCO)  MG per tablet 1 tablet, 1 tablet, Oral, 4x Daily PRN, Latoya Solorio, ADITYA  •  insulin detemir (LEVEMIR) injection 35 Units, 35 Units, Subcutaneous, Nightly, Latoya Solorio, ADITYA  •  Insulin Lispro (humaLOG) injection 0-14 Units, 0-14 Units, Subcutaneous, 4x Daily AC & at Bedtime, Cale  "Mono Terrazas MD  •  isosorbide mononitrate (IMDUR) 24 hr tablet 60 mg, 60 mg, Oral, Q24H, Mono Madsen MD  •  labetalol (NORMODYNE,TRANDATE) injection 10 mg, 10 mg, Intravenous, Q6H PRN, Mono Madsen MD  •  levETIRAcetam (KEPPRA) tablet 500 mg, 500 mg, Oral, BID, Mono Madsen MD  •  lisinopril (PRINIVIL,ZESTRIL) tablet 40 mg, 40 mg, Oral, Daily, Mono Madsen MD  •  metoprolol tartrate (LOPRESSOR) tablet 50 mg, 50 mg, Oral, Q12H, Mono Madsen MD  •  nicotine (NICODERM CQ) 14 MG/24HR patch 1 patch, 1 patch, Transdermal, Q24H, Latoya Solorio APRN  •  ondansetron (ZOFRAN) tablet 4 mg, 4 mg, Oral, Q6H PRN **OR** ondansetron (ZOFRAN) injection 4 mg, 4 mg, Intravenous, Q6H PRN, Mono Madsen MD  •  saccharomyces boulardii (FLORASTOR) capsule 250 mg, 250 mg, Oral, BID, Mono Madsen MD  •  sodium chloride 0.9 % flush 10 mL, 10 mL, Intravenous, Q12H, Mono Madsen MD  •  sodium chloride 0.9 % flush 10 mL, 10 mL, Intravenous, PRN, Mono Madsen MD  •  sotalol (BETAPACE) tablet 120 mg, 120 mg, Oral, Q12H, Mono Madsen MD  •  ziprasidone (GEODON) injection 5 mg, 5 mg, Intramuscular, Daily PRN, Mono Madsen MD    Data:     Code Status:    There are no questions and answers to display.       Family History   Problem Relation Age of Onset   • Cancer Mother         liver   • Heart disease Father        Social History     Socioeconomic History   • Marital status:    Tobacco Use   • Smoking status: Current Every Day Smoker     Packs/day: 0.50   • Smokeless tobacco: Never Used   Vaping Use   • Vaping Use: Never used   Substance and Sexual Activity   • Alcohol use: Never   • Drug use: Never   • Sexual activity: Defer       Vitals:  Ht 177.8 cm (70\")   Wt 116 kg (256 lb 8 oz)   BMI 36.80 kg/m²   T 98.2 P 77 R 18 /65 Sp02 96% (room air)          I/O (24Hr):  No intake or output data in the 24 hours ending " 07/20/22 1046    Labs and imaging:      Recent Results (from the past 12 hour(s))   POC Glucose Once    Collection Time: 07/20/22  7:05 AM    Specimen: Blood   Result Value Ref Range    Glucose 145 (H) 70 - 130 mg/dL         Physical Examination:        Physical Exam  Vitals and nursing note reviewed.   Constitutional:       Appearance: He is well-developed.   HENT:      Head: Normocephalic and atraumatic.      Right Ear: External ear normal.      Left Ear: External ear normal.      Nose: Nose normal.      Mouth/Throat:      Mouth: Mucous membranes are dry.      Pharynx: Oropharynx is clear.   Eyes:      Pupils: Pupils are equal, round, and reactive to light.   Cardiovascular:      Rate and Rhythm: Normal rate. Rhythm irregular.      Pulses: Normal pulses.      Heart sounds: Normal heart sounds.   Pulmonary:      Effort: Pulmonary effort is normal.      Breath sounds: Normal breath sounds.   Abdominal:      General: Bowel sounds are normal.      Palpations: Abdomen is soft.      Comments: obese   Musculoskeletal:         General: Normal range of motion.      Cervical back: Normal range of motion and neck supple.      Comments: Generalized weakness   Skin:     General: Skin is warm and dry.      Comments: Staples intact   Neurological:      Mental Status: He is alert and oriented to person, place, and time.      Deep Tendon Reflexes: Reflexes are normal and symmetric.      Comments: Intermittent confusion   Psychiatric:         Behavior: Behavior normal.           Assessment:           * No active hospital problems. *    Past Medical History:   Diagnosis Date   • Brain tumor (HCC)    • Coronary artery disease    • COVID-19 vaccine series completed     MODERNA X 3; LAST DOSE 3/2022   • Diabetes (HCC)    • Erectile dysfunction    • GERD (gastroesophageal reflux disease)    • Hypercholesteremia    • Hypertension         Plan:        1. Serratia marcescens epidural infection  2. Atrial fibrillation with RVR  3. DM2 with  hyperglycemia on long term insulin  4. HTN  5. Obesity  6. HLD  7. GERD  8. Hypokalemia    Continue current treatment. Monitor counts. Increase activity. Labs in am.  Aggressive therapies as tolerated. Maintain patient safety. Continue IV antibiotics under direction of ID through 7/22. Continue heart rate and rhythm control efforts - continue home dose betapace and cardizem. Continue pain control efforts.  Glycemic control - adjust insulin regimen. Continue tobacco cessation efforts and nicotine replacement.     Electronically signed by ADITYA Marino on 7/20/2022 at 10:46 CDT     I have discussed the care of Elijah Escobar, including pertinent history and exam findings, with the nurse practitioner.    I have seen and examined the patient and the key elements of all parts of the encounter have been performed by me.  I agree with the assessment, plan and orders as documented by ADITYA Cruz, after I modified the exam findings and the plan of treatments and the final version is my approved version of the assessment.        Electronically signed by Mono Madsen MD on 7/20/2022 at 11:55 CDT

## 2022-07-20 NOTE — PROGRESS NOTES
"Infectious Diseases Progress Note    Patient:  Elijah Escobar  YOB: 1955  MRN: [id number]   Admit date: 7/11/2022   Admitting Physician: Mono Madsen MD  Primary Care Physician: Dylan Lama APRN    Chief Complaint/Interval History: He feels okay.  He wants to go home.  He is comfortable.  No headache.  No diarrhea or rash.    No intake or output data in the 24 hours ending 07/20/22 1419  Allergies: No Known Allergies  Current Scheduled Medications:   aspirin, 81 mg, Oral, Daily  atorvastatin, 20 mg, Oral, Nightly  cefepime, 2 g, Intravenous, Q8H  dilTIAZem CD, 240 mg, Oral, Q24H  famotidine, 40 mg, Oral, Daily  gabapentin, 300 mg, Oral, TID  insulin detemir, 35 Units, Subcutaneous, Nightly  insulin lispro, 0-14 Units, Subcutaneous, 4x Daily AC & at Bedtime  isosorbide mononitrate, 60 mg, Oral, Q24H  levETIRAcetam, 500 mg, Oral, BID  lisinopril, 40 mg, Oral, Daily  metoprolol tartrate, 50 mg, Oral, Q12H  nicotine, 1 patch, Transdermal, Q24H  saccharomyces boulardii, 250 mg, Oral, BID  senna-docusate sodium, 2 tablet, Oral, Daily  sodium chloride, 10 mL, Intravenous, Q12H  sotalol, 120 mg, Oral, Q12H      Current PRN Medications:  •  acetaminophen **OR** acetaminophen  •  senna-docusate sodium **AND** polyethylene glycol **AND** bisacodyl **AND** bisacodyl  •  dextrose  •  dextrose  •  glucagon (human recombinant)  •  HYDROcodone-acetaminophen  •  labetalol  •  ondansetron **OR** ondansetron  •  sodium chloride  •  ziprasidone    Review of Systems see HPI    Vital Signs:  Ht 177.8 cm (70\")   Wt 116 kg (256 lb 8 oz)   BMI 36.80 kg/m²     Physical Exam  Vital signs - reviewed.  Line/IV (left arm peripheral) site - No erythema, warmth, induration, or tenderness.  Craniotomy site without swelling.  Incision well-healed.  No warmth, tenderness, induration, or drainage.    Lab Results:  CBC: Results from last 7 days   Lab Units 07/14/22  0527   WBC 10*3/mm3 8.27   HEMOGLOBIN g/dL 10.2* "   HEMATOCRIT % 30.7*   PLATELETS 10*3/mm3 294     BMP:  Results from last 7 days   Lab Units 07/14/22  0527   SODIUM mmol/L 138   POTASSIUM mmol/L 3.9   CHLORIDE mmol/L 105   CO2 mmol/L 21.0*   BUN mg/dL 10   CREATININE mg/dL 0.68*   GLUCOSE mg/dL 166*   CALCIUM mg/dL 8.4*     Culture Results: No results found for: BLOODCX, URINECX, WOUNDCX, MRSACX, RESPCX, STOOLCX  Radiology: None  Additional Studies Reviewed: None    Impression:   1.  Subgaleal/epidural infection with Serratia  2.  Resolved acute renal injury  3.  Diabetes mellitus  4.  Coronary artery disease    Recommendations:   Continue cefepime  No signs of antibiotic side effect  He will have completed 3 weeks of treatment on July 22, 2022  Continue to follow    Edwin Jeffers MD

## 2022-07-20 NOTE — PROGRESS NOTES
FARSHAD Salamanca APRN        Internal Medicine Progress Note    7/20/2022   07:29 CDT    Name:  Elijah Escobar  MRN:    [id number]     Acct:     [id number]   Room:  [address] [place] [address] Day: 0     Admit Date: 7/11/2022  3:17 PM  PCP: Dylan Lama APRN    Subjective:     C/C: weakness, confusion    Interval History: Status:  Improving daily. Anxious to go home.    Review of Systems   Constitutional: Positive for malaise/fatigue. Negative for chills, decreased appetite, weight gain and weight loss.   HENT: Negative for congestion, ear discharge, hoarse voice and tinnitus.    Eyes: Negative for blurred vision, discharge, visual disturbance and visual halos.   Cardiovascular: Negative for chest pain, claudication, dyspnea on exertion, irregular heartbeat, leg swelling, orthopnea and paroxysmal nocturnal dyspnea.   Respiratory: Negative for cough, shortness of breath, sputum production and wheezing.    Endocrine: Negative for cold intolerance, heat intolerance and polyuria.   Hematologic/Lymphatic: Negative for adenopathy. Does not bruise/bleed easily.   Skin: Negative for dry skin, itching and suspicious lesions.   Musculoskeletal: Negative for arthritis, back pain, falls, joint pain, muscle weakness and myalgias.   Gastrointestinal: Negative for abdominal pain, constipation, diarrhea, dysphagia and hematemesis.   Genitourinary: Negative for bladder incontinence, dysuria and frequency.   Neurological: Positive for headaches and weakness. Negative for aphonia, disturbances in coordination and dizziness.   Psychiatric/Behavioral: Negative for altered mental status, depression, memory loss and substance abuse. The patient does not have insomnia and is not nervous/anxious.          Medications:     Allergies: No Known Allergies    Current Meds:   Current Facility-Administered Medications:     acetaminophen (TYLENOL) tablet 650 mg, 650 mg, Oral, Q4H PRN **OR** acetaminophen (TYLENOL)  suppository 650 mg, 650 mg, Rectal, Q4H PRN, Mono Madsen MD    aspirin chewable tablet 81 mg, 81 mg, Oral, Daily, Mono Madsen MD    atorvastatin (LIPITOR) tablet 20 mg, 20 mg, Oral, Nightly, Mono Madsen MD    sennosides-docusate (PERICOLACE) 8.6-50 MG per tablet 2 tablet, 2 tablet, Oral, Daily **AND** polyethylene glycol (MIRALAX) packet 17 g, 17 g, Oral, Daily PRN **AND** bisacodyl (DULCOLAX) EC tablet 5 mg, 5 mg, Oral, Daily PRN **AND** bisacodyl (DULCOLAX) suppository 10 mg, 10 mg, Rectal, Daily PRN, Mono Madsen MD    cefepime (MAXIPIME) 2 g/100 mL 0.9% NS (mbp), 2 g, Intravenous, Q8H, Mono Madsen MD    dextrose (D50W) (25 g/50 mL) IV injection 25 g, 25 g, Intravenous, Q15 Min PRN, Mono Madsen MD    dextrose (GLUTOSE) oral gel 15 g, 15 g, Oral, Q15 Min PRN, Mono Madsen MD    dilTIAZem (CARDIZEM) 125 mg in 125 mL 0.7% sodium chloride  infusion, 5-15 mg/hr, Intravenous, Titrated, Mono Madsen MD    dilTIAZem CD (CARDIZEM CD) 24 hr capsule 240 mg, 240 mg, Oral, Q24H, Mono Madsen MD    famotidine (PEPCID) tablet 40 mg, 40 mg, Oral, Daily, Mono Madsen MD    gabapentin (NEURONTIN) capsule 300 mg, 300 mg, Oral, TID, Mono Madsen MD    glucagon (human recombinant) (GLUCAGEN DIAGNOSTIC) injection 1 mg, 1 mg, Intramuscular, Q15 Min PRN, Mono Madsen MD    HYDROcodone-acetaminophen (NORCO)  MG per tablet 1 tablet, 1 tablet, Oral, 4x Daily PRN, Latoya Solorio APRN    insulin detemir (LEVEMIR) injection 35 Units, 35 Units, Subcutaneous, Nightly, Latoya Solorio APRN    Insulin Lispro (humaLOG) injection 0-14 Units, 0-14 Units, Subcutaneous, 4x Daily AC & at Bedtime, Mono Madsen MD    isosorbide mononitrate (IMDUR) 24 hr tablet 60 mg, 60 mg, Oral, Q24H, Mono Madsen MD    labetalol (NORMODYNE,TRANDATE) injection 10 mg, 10 mg, Intravenous, Q6H PRN, Cale  "Mono Terrazas MD    levETIRAcetam (KEPPRA) tablet 500 mg, 500 mg, Oral, BID, Mono Madsen MD    lisinopril (PRINIVIL,ZESTRIL) tablet 40 mg, 40 mg, Oral, Daily, Mono Madsen MD    metoprolol tartrate (LOPRESSOR) tablet 50 mg, 50 mg, Oral, Q12H, Mono Madsen MD    nicotine (NICODERM CQ) 14 MG/24HR patch 1 patch, 1 patch, Transdermal, Q24H, Latoya Solorio APRN    ondansetron (ZOFRAN) tablet 4 mg, 4 mg, Oral, Q6H PRN **OR** ondansetron (ZOFRAN) injection 4 mg, 4 mg, Intravenous, Q6H PRN, Mono Madsen MD    saccharomyces boulardii (FLORASTOR) capsule 250 mg, 250 mg, Oral, BID, Mono Madsen MD    sodium chloride 0.9 % flush 10 mL, 10 mL, Intravenous, Q12H, Mono Madsen MD    sodium chloride 0.9 % flush 10 mL, 10 mL, Intravenous, PRN, Mono Madsen MD    sotalol (BETAPACE) tablet 120 mg, 120 mg, Oral, Q12H, Mono Madsen MD    ziprasidone (GEODON) injection 5 mg, 5 mg, Intramuscular, Daily PRN, oMno Madsen MD    Data:     Code Status:    There are no questions and answers to display.       Family History   Problem Relation Age of Onset    Cancer Mother         liver    Heart disease Father        Social History     Socioeconomic History    Marital status:    Tobacco Use    Smoking status: Current Every Day Smoker     Packs/day: 0.50    Smokeless tobacco: Never Used   Vaping Use    Vaping Use: Never used   Substance and Sexual Activity    Alcohol use: Never    Drug use: Never    Sexual activity: Defer       Vitals:  Ht 177.8 cm (70\")   Wt 116 kg (256 lb 8 oz)   BMI 36.80 kg/m²   T 99 P 70 R 20 /74 spO2 98% (room air)          I/O (24Hr):  No intake or output data in the 24 hours ending 07/20/22 0729    Labs and imaging:      Recent Results (from the past 12 hour(s))   POC Glucose Once    Collection Time: 07/19/22 10:10 PM    Specimen: Blood   Result Value Ref Range    Glucose 157 (H) 70 - 130 mg/dL   POC Glucose " Once    Collection Time: 07/20/22  7:05 AM    Specimen: Blood   Result Value Ref Range    Glucose 145 (H) 70 - 130 mg/dL         Physical Examination:        Physical Exam  Vitals and nursing note reviewed.   Constitutional:       Appearance: He is well-developed.   HENT:      Head: Normocephalic and atraumatic.      Right Ear: External ear normal.      Left Ear: External ear normal.      Nose: Nose normal.      Mouth/Throat:      Mouth: Mucous membranes are dry.      Pharynx: Oropharynx is clear.   Eyes:      Pupils: Pupils are equal, round, and reactive to light.   Cardiovascular:      Rate and Rhythm: Normal rate. Rhythm irregular.      Pulses: Normal pulses.      Heart sounds: Normal heart sounds.   Pulmonary:      Effort: Pulmonary effort is normal.      Breath sounds: Normal breath sounds.   Abdominal:      General: Bowel sounds are normal.      Palpations: Abdomen is soft.      Comments: obese   Musculoskeletal:         General: Normal range of motion.      Cervical back: Normal range of motion and neck supple.      Comments: Generalized weakness   Skin:     General: Skin is warm and dry.      Comments: Staples intact   Neurological:      Mental Status: He is alert and oriented to person, place, and time.      Deep Tendon Reflexes: Reflexes are normal and symmetric.      Comments: Intermittent confusion   Psychiatric:         Behavior: Behavior normal.           Assessment:           * No active hospital problems. *    Past Medical History:   Diagnosis Date    Brain tumor (HCC)     Coronary artery disease     COVID-19 vaccine series completed     MODERNA X 3; LAST DOSE 3/2022    Diabetes (HCC)     Erectile dysfunction     GERD (gastroesophageal reflux disease)     Hypercholesteremia     Hypertension         Plan:        Serratia marcescens epidural infection  Atrial fibrillation with RVR  DM2 with hyperglycemia on long term insulin  HTN  Obesity  HLD  GERD  Hypokalemia    Continue current treatment. Monitor  counts. Increase activity. Labs Thursday.  Aggressive therapies as tolerated. Maintain patient safety. Continue IV antibiotics under direction of ID. Continue heart rate and rhythm control efforts - continue home dose betapace and cardizem. Continue pain control efforts.  Glycemic control - adjust insulin regimen. Continue tobacco cessation efforts and nicotine replacement.     Electronically signed by Mono Madsen MD on 7/20/2022 at 07:29 CDT

## 2022-07-21 LAB
ALBUMIN SERPL-MCNC: 3.2 G/DL (ref 3.5–5.2)
ALBUMIN/GLOB SERPL: 0.9 G/DL
ALP SERPL-CCNC: 121 U/L (ref 39–117)
ALT SERPL W P-5'-P-CCNC: 12 U/L (ref 1–41)
ANION GAP SERPL CALCULATED.3IONS-SCNC: 11 MMOL/L (ref 5–15)
AST SERPL-CCNC: 14 U/L (ref 1–40)
BILIRUB SERPL-MCNC: 0.3 MG/DL (ref 0–1.2)
BUN SERPL-MCNC: 6 MG/DL (ref 8–23)
BUN/CREAT SERPL: 8.5 (ref 7–25)
CALCIUM SPEC-SCNC: 8.5 MG/DL (ref 8.6–10.5)
CHLORIDE SERPL-SCNC: 106 MMOL/L (ref 98–107)
CO2 SERPL-SCNC: 24 MMOL/L (ref 22–29)
CREAT SERPL-MCNC: 0.71 MG/DL (ref 0.76–1.27)
CRP SERPL-MCNC: 0.41 MG/DL (ref 0–0.5)
DEPRECATED RDW RBC AUTO: 43.1 FL (ref 37–54)
EGFRCR SERPLBLD CKD-EPI 2021: 101.2 ML/MIN/1.73
ERYTHROCYTE [DISTWIDTH] IN BLOOD BY AUTOMATED COUNT: 12.8 % (ref 12.3–15.4)
GLOBULIN UR ELPH-MCNC: 3.6 GM/DL
GLUCOSE BLDC GLUCOMTR-MCNC: 113 MG/DL (ref 70–130)
GLUCOSE BLDC GLUCOMTR-MCNC: 138 MG/DL (ref 70–130)
GLUCOSE BLDC GLUCOMTR-MCNC: 149 MG/DL (ref 70–130)
GLUCOSE BLDC GLUCOMTR-MCNC: 194 MG/DL (ref 70–130)
GLUCOSE SERPL-MCNC: 119 MG/DL (ref 65–99)
HCT VFR BLD AUTO: 30.9 % (ref 37.5–51)
HGB BLD-MCNC: 9.8 G/DL (ref 13–17.7)
MCH RBC QN AUTO: 29.8 PG (ref 26.6–33)
MCHC RBC AUTO-ENTMCNC: 31.7 G/DL (ref 31.5–35.7)
MCV RBC AUTO: 93.9 FL (ref 79–97)
PLATELET # BLD AUTO: 252 10*3/MM3 (ref 140–450)
PMV BLD AUTO: 9.5 FL (ref 6–12)
POTASSIUM SERPL-SCNC: 3.4 MMOL/L (ref 3.5–5.2)
PROT SERPL-MCNC: 6.8 G/DL (ref 6–8.5)
RBC # BLD AUTO: 3.29 10*6/MM3 (ref 4.14–5.8)
SODIUM SERPL-SCNC: 141 MMOL/L (ref 136–145)
WBC NRBC COR # BLD: 7.77 10*3/MM3 (ref 3.4–10.8)

## 2022-07-21 PROCEDURE — 82962 GLUCOSE BLOOD TEST: CPT

## 2022-07-21 PROCEDURE — 80053 COMPREHEN METABOLIC PANEL: CPT | Performed by: INTERNAL MEDICINE

## 2022-07-21 PROCEDURE — 85027 COMPLETE CBC AUTOMATED: CPT | Performed by: INTERNAL MEDICINE

## 2022-07-21 PROCEDURE — 86140 C-REACTIVE PROTEIN: CPT | Performed by: INTERNAL MEDICINE

## 2022-07-21 PROCEDURE — 97129 THER IVNTJ 1ST 15 MIN: CPT

## 2022-07-21 PROCEDURE — 97130 THER IVNTJ EA ADDL 15 MIN: CPT

## 2022-07-21 PROCEDURE — 63710000001 INSULIN DETEMIR PER 5 UNITS: Performed by: NURSE PRACTITIONER

## 2022-07-21 PROCEDURE — 97116 GAIT TRAINING THERAPY: CPT

## 2022-07-21 PROCEDURE — 25010000002 CEFEPIME PER 500 MG: Performed by: INTERNAL MEDICINE

## 2022-07-21 RX ORDER — POTASSIUM CHLORIDE 750 MG/1
20 CAPSULE, EXTENDED RELEASE ORAL
Status: DISPENSED | OUTPATIENT
Start: 2022-07-21 | End: 2022-07-22

## 2022-07-21 NOTE — PROGRESS NOTES
FARSHAD Salamanca APRN        Internal Medicine Progress Note    7/21/2022   14:33 CDT    Name:  Elijah Escobar  MRN:    [id number]     Acct:     [id number]   Room:  [facility] Day: 0     Admit Date: 7/11/2022  3:17 PM  PCP: Dylan Lama APRN    Subjective:     C/C: weakness, confusion    Interval History: Status:  Improved. Resting in bed.  No family at bedside.  Afebrile. Progressing with therapies. 02 sats stable on room air.  Heart rates stable. Alert and oriented at present time, but increased confusion overnight.  Blood sugars improved. Potassium 3.4. Counts otherwise stable.   Anxious for discharge.     Review of Systems   Constitutional: Positive for malaise/fatigue. Negative for chills, decreased appetite, weight gain and weight loss.   HENT: Negative for congestion, ear discharge, hoarse voice and tinnitus.    Eyes: Negative for blurred vision, discharge, visual disturbance and visual halos.   Cardiovascular: Negative for chest pain, claudication, dyspnea on exertion, irregular heartbeat, leg swelling, orthopnea and paroxysmal nocturnal dyspnea.   Respiratory: Negative for cough, shortness of breath, sputum production and wheezing.    Endocrine: Negative for cold intolerance, heat intolerance and polyuria.   Hematologic/Lymphatic: Negative for adenopathy. Does not bruise/bleed easily.   Skin: Negative for dry skin, itching and suspicious lesions.   Musculoskeletal: Negative for arthritis, back pain, falls, joint pain, muscle weakness and myalgias.   Gastrointestinal: Negative for abdominal pain, constipation, diarrhea, dysphagia and hematemesis.   Genitourinary: Negative for bladder incontinence, dysuria and frequency.   Neurological: Positive for headaches and weakness. Negative for aphonia, disturbances in coordination and dizziness.   Psychiatric/Behavioral: Negative for altered mental status, depression, memory loss and substance abuse. The patient does not  have insomnia and is not nervous/anxious.          Medications:     Allergies: No Known Allergies    Current Meds:   Current Facility-Administered Medications:   •  acetaminophen (TYLENOL) tablet 650 mg, 650 mg, Oral, Q4H PRN **OR** acetaminophen (TYLENOL) suppository 650 mg, 650 mg, Rectal, Q4H PRN, Mono Madsen MD  •  aspirin chewable tablet 81 mg, 81 mg, Oral, Daily, Mono Madsen MD  •  atorvastatin (LIPITOR) tablet 20 mg, 20 mg, Oral, Nightly, Mono Madsen MD  •  sennosides-docusate (PERICOLACE) 8.6-50 MG per tablet 2 tablet, 2 tablet, Oral, Daily **AND** polyethylene glycol (MIRALAX) packet 17 g, 17 g, Oral, Daily PRN **AND** bisacodyl (DULCOLAX) EC tablet 5 mg, 5 mg, Oral, Daily PRN **AND** bisacodyl (DULCOLAX) suppository 10 mg, 10 mg, Rectal, Daily PRN, Mono Madsen MD  •  cefepime (MAXIPIME) 2 g/100 mL 0.9% NS (mbp), 2 g, Intravenous, Q8H, Mono Madsen MD  •  dextrose (D50W) (25 g/50 mL) IV injection 25 g, 25 g, Intravenous, Q15 Min PRN, Mono Madsen MD  •  dextrose (GLUTOSE) oral gel 15 g, 15 g, Oral, Q15 Min PRN, Mono Madsen MD  •  dilTIAZem (CARDIZEM) 125 mg in 125 mL 0.7% sodium chloride  infusion, 5-15 mg/hr, Intravenous, Titrated, Mono Madsen MD  •  dilTIAZem CD (CARDIZEM CD) 24 hr capsule 240 mg, 240 mg, Oral, Q24H, Mono Madsen MD  •  famotidine (PEPCID) tablet 40 mg, 40 mg, Oral, Daily, Mono Madsen MD  •  gabapentin (NEURONTIN) capsule 300 mg, 300 mg, Oral, TID, Mono Madsen MD  •  glucagon (human recombinant) (GLUCAGEN DIAGNOSTIC) injection 1 mg, 1 mg, Intramuscular, Q15 Min PRN, Mono Madsen MD  •  HYDROcodone-acetaminophen (NORCO)  MG per tablet 1 tablet, 1 tablet, Oral, 4x Daily PRN, Latoya Solorio, ADITYA  •  insulin detemir (LEVEMIR) injection 35 Units, 35 Units, Subcutaneous, Nightly, Latoya Solorio, ADITYA  •  Insulin Lispro (humaLOG) injection 0-14 Units,  "0-14 Units, Subcutaneous, 4x Daily AC & at Bedtime, Mono Madsen MD  •  isosorbide mononitrate (IMDUR) 24 hr tablet 60 mg, 60 mg, Oral, Q24H, Mono Madsen MD  •  labetalol (NORMODYNE,TRANDATE) injection 10 mg, 10 mg, Intravenous, Q6H PRN, Mono Madsen MD  •  levETIRAcetam (KEPPRA) tablet 500 mg, 500 mg, Oral, BID, Mono Madsen MD  •  lisinopril (PRINIVIL,ZESTRIL) tablet 40 mg, 40 mg, Oral, Daily, Mono Madsen MD  •  metoprolol tartrate (LOPRESSOR) tablet 50 mg, 50 mg, Oral, Q12H, Mono Madsen MD  •  nicotine (NICODERM CQ) 14 MG/24HR patch 1 patch, 1 patch, Transdermal, Q24H, Latoya Solorio APRN  •  ondansetron (ZOFRAN) tablet 4 mg, 4 mg, Oral, Q6H PRN **OR** ondansetron (ZOFRAN) injection 4 mg, 4 mg, Intravenous, Q6H PRN, Mono Madsen MD  •  potassium chloride (MICRO-K) CR capsule 20 mEq, 20 mEq, Oral, Once, Mono Madsen MD  •  saccharomyces boulardii (FLORASTOR) capsule 250 mg, 250 mg, Oral, BID, Mono Madsen MD  •  sodium chloride 0.9 % flush 10 mL, 10 mL, Intravenous, Q12H, Mono Madsen MD  •  sodium chloride 0.9 % flush 10 mL, 10 mL, Intravenous, PRN, Mono Madsen MD  •  sotalol (BETAPACE) tablet 120 mg, 120 mg, Oral, Q12H, Mono Madsen MD  •  ziprasidone (GEODON) injection 5 mg, 5 mg, Intramuscular, Daily PRN, Mono Madsen MD    Data:     Code Status:    There are no questions and answers to display.       Family History   Problem Relation Age of Onset   • Cancer Mother         liver   • Heart disease Father        Social History     Socioeconomic History   • Marital status:    Tobacco Use   • Smoking status: Current Every Day Smoker     Packs/day: 0.50   • Smokeless tobacco: Never Used   Vaping Use   • Vaping Use: Never used   Substance and Sexual Activity   • Alcohol use: Never   • Drug use: Never   • Sexual activity: Defer       Vitals:  Ht 177.8 cm (70\")   Wt 116 " kg (256 lb 8 oz)   BMI 36.80 kg/m²   T 97.8 P 70 R 17 /82 Sp02 92% (room air)          I/O (24Hr):  No intake or output data in the 24 hours ending 07/21/22 1433    Labs and imaging:      Recent Results (from the past 12 hour(s))   Comprehensive Metabolic Panel    Collection Time: 07/21/22  5:43 AM    Specimen: Blood   Result Value Ref Range    Glucose 119 (H) 65 - 99 mg/dL    BUN 6 (L) 8 - 23 mg/dL    Creatinine 0.71 (L) 0.76 - 1.27 mg/dL    Sodium 141 136 - 145 mmol/L    Potassium 3.4 (L) 3.5 - 5.2 mmol/L    Chloride 106 98 - 107 mmol/L    CO2 24.0 22.0 - 29.0 mmol/L    Calcium 8.5 (L) 8.6 - 10.5 mg/dL    Total Protein 6.8 6.0 - 8.5 g/dL    Albumin 3.20 (L) 3.50 - 5.20 g/dL    ALT (SGPT) 12 1 - 41 U/L    AST (SGOT) 14 1 - 40 U/L    Alkaline Phosphatase 121 (H) 39 - 117 U/L    Total Bilirubin 0.3 0.0 - 1.2 mg/dL    Globulin 3.6 gm/dL    A/G Ratio 0.9 g/dL    BUN/Creatinine Ratio 8.5 7.0 - 25.0    Anion Gap 11.0 5.0 - 15.0 mmol/L    eGFR 101.2 >60.0 mL/min/1.73   CBC (No Diff)    Collection Time: 07/21/22  5:43 AM    Specimen: Blood   Result Value Ref Range    WBC 7.77 3.40 - 10.80 10*3/mm3    RBC 3.29 (L) 4.14 - 5.80 10*6/mm3    Hemoglobin 9.8 (L) 13.0 - 17.7 g/dL    Hematocrit 30.9 (L) 37.5 - 51.0 %    MCV 93.9 79.0 - 97.0 fL    MCH 29.8 26.6 - 33.0 pg    MCHC 31.7 31.5 - 35.7 g/dL    RDW 12.8 12.3 - 15.4 %    RDW-SD 43.1 37.0 - 54.0 fl    MPV 9.5 6.0 - 12.0 fL    Platelets 252 140 - 450 10*3/mm3   C-reactive Protein    Collection Time: 07/21/22  5:43 AM    Specimen: Blood   Result Value Ref Range    C-Reactive Protein 0.41 0.00 - 0.50 mg/dL   POC Glucose Once    Collection Time: 07/21/22  7:06 AM    Specimen: Blood   Result Value Ref Range    Glucose 113 70 - 130 mg/dL   POC Glucose Once    Collection Time: 07/21/22 11:31 AM    Specimen: Blood   Result Value Ref Range    Glucose 138 (H) 70 - 130 mg/dL         Physical Examination:        Physical Exam  Vitals and nursing note reviewed.   Constitutional:        Appearance: He is well-developed.   HENT:      Head: Normocephalic and atraumatic.      Right Ear: External ear normal.      Left Ear: External ear normal.      Nose: Nose normal.      Mouth/Throat:      Mouth: Mucous membranes are dry.      Pharynx: Oropharynx is clear.   Eyes:      Pupils: Pupils are equal, round, and reactive to light.   Cardiovascular:      Rate and Rhythm: Normal rate. Rhythm irregular.      Pulses: Normal pulses.      Heart sounds: Normal heart sounds.   Pulmonary:      Effort: Pulmonary effort is normal.      Breath sounds: Normal breath sounds.   Abdominal:      General: Bowel sounds are normal.      Palpations: Abdomen is soft.      Comments: obese   Musculoskeletal:         General: Normal range of motion.      Cervical back: Normal range of motion and neck supple.      Comments: Generalized weakness   Skin:     General: Skin is warm and dry.   Neurological:      Mental Status: He is alert and oriented to person, place, and time.      Deep Tendon Reflexes: Reflexes are normal and symmetric.      Comments: Intermittent confusion   Psychiatric:         Behavior: Behavior normal.           Assessment:           * No active hospital problems. *    Past Medical History:   Diagnosis Date   • Brain tumor (HCC)    • Coronary artery disease    • COVID-19 vaccine series completed     MODERNA X 3; LAST DOSE 3/2022   • Diabetes (HCC)    • Erectile dysfunction    • GERD (gastroesophageal reflux disease)    • Hypercholesteremia    • Hypertension         Plan:        1. Serratia marcescens epidural infection  2. Atrial fibrillation with RVR  3. DM2 with hyperglycemia on long term insulin  4. HTN  5. Obesity  6. HLD  7. GERD  8. Hypokalemia    Continue current treatment. Monitor counts. Increase activity. Labs Monday.  Aggressive therapies as tolerated. Maintain patient safety. Continue IV antibiotics under direction of ID through 7/22. Continue heart rate and rhythm control efforts - continue home  dose betapace and cardizem. Continue pain control efforts.  Glycemic control - adjust insulin regimen. Continue tobacco cessation efforts and nicotine replacement. Replace and recheck potassium.     Electronically signed by ADITYA Marino on 7/21/2022 at 14:33 CDT

## 2022-07-22 LAB
ANION GAP SERPL CALCULATED.3IONS-SCNC: 13 MMOL/L (ref 5–15)
BUN SERPL-MCNC: 7 MG/DL (ref 8–23)
BUN/CREAT SERPL: 9.3 (ref 7–25)
CALCIUM SPEC-SCNC: 8.7 MG/DL (ref 8.6–10.5)
CHLORIDE SERPL-SCNC: 106 MMOL/L (ref 98–107)
CO2 SERPL-SCNC: 21 MMOL/L (ref 22–29)
CREAT SERPL-MCNC: 0.75 MG/DL (ref 0.76–1.27)
EGFRCR SERPLBLD CKD-EPI 2021: 99.5 ML/MIN/1.73
GLUCOSE BLDC GLUCOMTR-MCNC: 128 MG/DL (ref 70–130)
GLUCOSE BLDC GLUCOMTR-MCNC: 155 MG/DL (ref 70–130)
GLUCOSE BLDC GLUCOMTR-MCNC: 176 MG/DL (ref 70–130)
GLUCOSE BLDC GLUCOMTR-MCNC: 177 MG/DL (ref 70–130)
GLUCOSE SERPL-MCNC: 140 MG/DL (ref 65–99)
MAGNESIUM SERPL-MCNC: 1.7 MG/DL (ref 1.6–2.4)
POTASSIUM SERPL-SCNC: 3.7 MMOL/L (ref 3.5–5.2)
SODIUM SERPL-SCNC: 140 MMOL/L (ref 136–145)

## 2022-07-22 PROCEDURE — 63710000001 INSULIN LISPRO (HUMAN) PER 5 UNITS: Performed by: INTERNAL MEDICINE

## 2022-07-22 PROCEDURE — 83735 ASSAY OF MAGNESIUM: CPT | Performed by: INTERNAL MEDICINE

## 2022-07-22 PROCEDURE — 80048 BASIC METABOLIC PNL TOTAL CA: CPT | Performed by: INTERNAL MEDICINE

## 2022-07-22 PROCEDURE — 82962 GLUCOSE BLOOD TEST: CPT

## 2022-07-22 PROCEDURE — 63710000001 INSULIN DETEMIR PER 5 UNITS: Performed by: NURSE PRACTITIONER

## 2022-07-22 PROCEDURE — 97535 SELF CARE MNGMENT TRAINING: CPT

## 2022-07-22 PROCEDURE — 97116 GAIT TRAINING THERAPY: CPT

## 2022-07-22 NOTE — CONSULTS
Called and spoke with Fanny, Charge nurse, about patients vascular access. Pt has blood clots in bilateral arms and has had several failed PIVs placed with US. Multiple areas of noncompressibility noted upon last US assessment. I can't find any more peripheral access options for this patient, and it is highly unlikely that a PIV would be able to function through 8/9 as his antibiotics are currently scheduled. Recommend a CVC as his arms are not currently options.

## 2022-07-22 NOTE — PROGRESS NOTES
FARSHAD Salamanca APRN        Internal Medicine Progress Note    7/22/2022   11:24 CDT    Name:  Elijah Escobar  MRN:    [id number]     Acct:     [id number]   Room:  [facility] Day: 0     Admit Date: 7/11/2022  3:17 PM  PCP: Dylan Lama APRN    Subjective:     C/C: weakness, confusion    Interval History: Status:  Improved. Up to chair.  No family at bedside.  Afebrile. Progressing with therapies. 02 sats stable on room air.  Heart rates stable. Alert and oriented at present time, but increased confusion overnight.  Blood sugars improved. Potassium corrected. Counts otherwise stable.   Anxious for discharge.     Review of Systems   Constitutional: Positive for malaise/fatigue. Negative for chills, decreased appetite, weight gain and weight loss.   HENT: Negative for congestion, ear discharge, hoarse voice and tinnitus.    Eyes: Negative for blurred vision, discharge, visual disturbance and visual halos.   Cardiovascular: Negative for chest pain, claudication, dyspnea on exertion, irregular heartbeat, leg swelling, orthopnea and paroxysmal nocturnal dyspnea.   Respiratory: Negative for cough, shortness of breath, sputum production and wheezing.    Endocrine: Negative for cold intolerance, heat intolerance and polyuria.   Hematologic/Lymphatic: Negative for adenopathy. Does not bruise/bleed easily.   Skin: Negative for dry skin, itching and suspicious lesions.   Musculoskeletal: Negative for arthritis, back pain, falls, joint pain, muscle weakness and myalgias.   Gastrointestinal: Negative for abdominal pain, constipation, diarrhea, dysphagia and hematemesis.   Genitourinary: Negative for bladder incontinence, dysuria and frequency.   Neurological: Positive for headaches and weakness. Negative for aphonia, disturbances in coordination and dizziness.   Psychiatric/Behavioral: Negative for altered mental status, depression, memory loss and substance abuse. The patient does  not have insomnia and is not nervous/anxious.          Medications:     Allergies: No Known Allergies    Current Meds:   Current Facility-Administered Medications:   •  acetaminophen (TYLENOL) tablet 650 mg, 650 mg, Oral, Q4H PRN **OR** acetaminophen (TYLENOL) suppository 650 mg, 650 mg, Rectal, Q4H PRN, Mono Madsen MD  •  aspirin chewable tablet 81 mg, 81 mg, Oral, Daily, Mono Madsen MD  •  atorvastatin (LIPITOR) tablet 20 mg, 20 mg, Oral, Nightly, Mono Madsen MD  •  sennosides-docusate (PERICOLACE) 8.6-50 MG per tablet 2 tablet, 2 tablet, Oral, Daily **AND** polyethylene glycol (MIRALAX) packet 17 g, 17 g, Oral, Daily PRN **AND** bisacodyl (DULCOLAX) EC tablet 5 mg, 5 mg, Oral, Daily PRN **AND** bisacodyl (DULCOLAX) suppository 10 mg, 10 mg, Rectal, Daily PRN, Mono Madsen MD  •  cefepime (MAXIPIME) 2 g/100 mL 0.9% NS (mbp), 2 g, Intravenous, Q8H, Mono Madsne MD  •  dextrose (D50W) (25 g/50 mL) IV injection 25 g, 25 g, Intravenous, Q15 Min PRN, Mono Madsen MD  •  dextrose (GLUTOSE) oral gel 15 g, 15 g, Oral, Q15 Min PRN, Mono Madsen MD  •  dilTIAZem (CARDIZEM) 125 mg in 125 mL 0.7% sodium chloride  infusion, 5-15 mg/hr, Intravenous, Titrated, Mono Madsen MD  •  dilTIAZem CD (CARDIZEM CD) 24 hr capsule 240 mg, 240 mg, Oral, Q24H, Mono Madsen MD  •  famotidine (PEPCID) tablet 40 mg, 40 mg, Oral, Daily, Mono Madsen MD  •  gabapentin (NEURONTIN) capsule 300 mg, 300 mg, Oral, TID, Mono Madsen MD  •  glucagon (human recombinant) (GLUCAGEN DIAGNOSTIC) injection 1 mg, 1 mg, Intramuscular, Q15 Min PRN, Mono Madsen MD  •  HYDROcodone-acetaminophen (NORCO)  MG per tablet 1 tablet, 1 tablet, Oral, 4x Daily PRN, Latoya Solorio APRN  •  insulin detemir (LEVEMIR) injection 35 Units, 35 Units, Subcutaneous, Nightly, Latoya Solorio, ADITYA  •  Insulin Lispro (humaLOG) injection 0-14  "Units, 0-14 Units, Subcutaneous, 4x Daily AC & at Bedtime, Mono Madsen MD  •  isosorbide mononitrate (IMDUR) 24 hr tablet 60 mg, 60 mg, Oral, Q24H, Mono Madsen MD  •  labetalol (NORMODYNE,TRANDATE) injection 10 mg, 10 mg, Intravenous, Q6H PRN, Mono Madsen MD  •  levETIRAcetam (KEPPRA) tablet 500 mg, 500 mg, Oral, BID, Mono Madsen MD  •  lisinopril (PRINIVIL,ZESTRIL) tablet 40 mg, 40 mg, Oral, Daily, Mono Madsen MD  •  metoprolol tartrate (LOPRESSOR) tablet 50 mg, 50 mg, Oral, Q12H, Mono Madsen MD  •  nicotine (NICODERM CQ) 14 MG/24HR patch 1 patch, 1 patch, Transdermal, Q24H, Latoya Solorio APRN  •  ondansetron (ZOFRAN) tablet 4 mg, 4 mg, Oral, Q6H PRN **OR** ondansetron (ZOFRAN) injection 4 mg, 4 mg, Intravenous, Q6H PRN, Mono Madsen MD  •  saccharomyces boulardii (FLORASTOR) capsule 250 mg, 250 mg, Oral, BID, Mono Madsen MD  •  sodium chloride 0.9 % flush 10 mL, 10 mL, Intravenous, Q12H, Mono Madsen MD  •  sodium chloride 0.9 % flush 10 mL, 10 mL, Intravenous, PRN, Mono Madsen MD  •  sotalol (BETAPACE) tablet 120 mg, 120 mg, Oral, Q12H, Mono Madsen MD  •  ziprasidone (GEODON) injection 5 mg, 5 mg, Intramuscular, Daily PRN, Mono Madsen MD    Data:     Code Status:    There are no questions and answers to display.       Family History   Problem Relation Age of Onset   • Cancer Mother         liver   • Heart disease Father        Social History     Socioeconomic History   • Marital status:    Tobacco Use   • Smoking status: Current Every Day Smoker     Packs/day: 0.50   • Smokeless tobacco: Never Used   Vaping Use   • Vaping Use: Never used   Substance and Sexual Activity   • Alcohol use: Never   • Drug use: Never   • Sexual activity: Defer       Vitals:  Ht 177.8 cm (70\")   Wt 116 kg (256 lb 8 oz)   BMI 36.80 kg/m²   T 97.6 P 78 R 18 /72 Sp02 94% (room " air)          I/O (24Hr):  No intake or output data in the 24 hours ending 07/22/22 1124    Labs and imaging:      Recent Results (from the past 12 hour(s))   Basic Metabolic Panel    Collection Time: 07/22/22  6:29 AM    Specimen: Blood   Result Value Ref Range    Glucose 140 (H) 65 - 99 mg/dL    BUN 7 (L) 8 - 23 mg/dL    Creatinine 0.75 (L) 0.76 - 1.27 mg/dL    Sodium 140 136 - 145 mmol/L    Potassium 3.7 3.5 - 5.2 mmol/L    Chloride 106 98 - 107 mmol/L    CO2 21.0 (L) 22.0 - 29.0 mmol/L    Calcium 8.7 8.6 - 10.5 mg/dL    BUN/Creatinine Ratio 9.3 7.0 - 25.0    Anion Gap 13.0 5.0 - 15.0 mmol/L    eGFR 99.5 >60.0 mL/min/1.73   Magnesium    Collection Time: 07/22/22  6:29 AM    Specimen: Blood   Result Value Ref Range    Magnesium 1.7 1.6 - 2.4 mg/dL   POC Glucose Once    Collection Time: 07/22/22  7:08 AM    Specimen: Blood   Result Value Ref Range    Glucose 128 70 - 130 mg/dL   POC Glucose Once    Collection Time: 07/22/22 11:08 AM    Specimen: Blood   Result Value Ref Range    Glucose 176 (H) 70 - 130 mg/dL         Physical Examination:        Physical Exam  Vitals and nursing note reviewed.   Constitutional:       Appearance: He is well-developed.   HENT:      Head: Normocephalic and atraumatic.      Right Ear: External ear normal.      Left Ear: External ear normal.      Nose: Nose normal.      Mouth/Throat:      Mouth: Mucous membranes are dry.      Pharynx: Oropharynx is clear.   Eyes:      Pupils: Pupils are equal, round, and reactive to light.   Cardiovascular:      Rate and Rhythm: Normal rate. Rhythm irregular.      Pulses: Normal pulses.      Heart sounds: Normal heart sounds.   Pulmonary:      Effort: Pulmonary effort is normal.      Breath sounds: Normal breath sounds.   Abdominal:      General: Bowel sounds are normal.      Palpations: Abdomen is soft.      Comments: obese   Musculoskeletal:         General: Normal range of motion.      Cervical back: Normal range of motion and neck supple.       Comments: Generalized weakness   Skin:     General: Skin is warm and dry.   Neurological:      Mental Status: He is alert and oriented to person, place, and time.      Deep Tendon Reflexes: Reflexes are normal and symmetric.      Comments: Intermittent confusion   Psychiatric:         Behavior: Behavior normal.           Assessment:           * No active hospital problems. *    Past Medical History:   Diagnosis Date   • Brain tumor (HCC)    • Coronary artery disease    • COVID-19 vaccine series completed     MODERNA X 3; LAST DOSE 3/2022   • Diabetes (HCC)    • Erectile dysfunction    • GERD (gastroesophageal reflux disease)    • Hypercholesteremia    • Hypertension         Plan:        1. Serratia marcescens epidural infection  2. Atrial fibrillation with RVR  3. DM2 with hyperglycemia on long term insulin  4. HTN  5. Obesity  6. HLD  7. GERD  8. Hypokalemia    Continue current treatment. Monitor counts. Increase activity. Labs Monday.  Aggressive therapies as tolerated. Maintain patient safety. Continue IV antibiotics under direction of ID through 7/22. Continue heart rate and rhythm control efforts - continue home dose betapace and cardizem. Continue pain control efforts.  Glycemic control - adjust insulin regimen. Continue tobacco cessation efforts and nicotine replacement.     Electronically signed by ADITYA Marino on 7/22/2022 at 11:24 CDT   I have discussed the care of Elijah Escobar, including pertinent history and exam findings, with the nurse practitioner.    I have seen and examined the patient and the key elements of all parts of the encounter have been performed by me.  I agree with the assessment, plan and orders as documented by ADITYA Cruz, after I modified the exam findings and the plan of treatments and the final version is my approved version of the assessment.        Electronically signed by Mono Madsen MD on 7/22/2022 at 22:25 CDT

## 2022-07-23 LAB
GLUCOSE BLDC GLUCOMTR-MCNC: 102 MG/DL (ref 70–130)
GLUCOSE BLDC GLUCOMTR-MCNC: 131 MG/DL (ref 70–130)
GLUCOSE BLDC GLUCOMTR-MCNC: 172 MG/DL (ref 70–130)
GLUCOSE BLDC GLUCOMTR-MCNC: 219 MG/DL (ref 70–130)

## 2022-07-23 PROCEDURE — 63710000001 INSULIN LISPRO (HUMAN) PER 5 UNITS: Performed by: INTERNAL MEDICINE

## 2022-07-23 PROCEDURE — 63710000001 INSULIN DETEMIR PER 5 UNITS: Performed by: NURSE PRACTITIONER

## 2022-07-23 PROCEDURE — 82962 GLUCOSE BLOOD TEST: CPT

## 2022-07-23 PROCEDURE — 25010000002 ZIPRASIDONE MESYLATE PER 10 MG: Performed by: INTERNAL MEDICINE

## 2022-07-23 NOTE — PROGRESS NOTES
FARSHAD Salamanca APRN        Internal Medicine Progress Note    7/23/2022   13:11 CDT    Name:  Elijah Escobar  MRN:    [id number]     Acct:     [id number]   Room:  [facility] Day: 0     Admit Date: 7/11/2022  3:17 PM  PCP: Dylan aLma APRN    Subjective:     C/C: weakness, confusion    Interval History: Status:  Improved. Resting in the bed.  No family at bedside.  Afebrile. Progressing with therapies. 02 sats stable on room air.  Heart rates stable. Alert and oriented at present time, but increased confusion overnight.  Blood sugars improved. Potassium corrected. Counts otherwise stable.   Anxious for discharge.     Review of Systems   Constitutional: Positive for malaise/fatigue. Negative for chills, decreased appetite, weight gain and weight loss.   HENT: Negative for congestion, ear discharge, hoarse voice and tinnitus.    Eyes: Negative for blurred vision, discharge, visual disturbance and visual halos.   Cardiovascular: Negative for chest pain, claudication, dyspnea on exertion, irregular heartbeat, leg swelling, orthopnea and paroxysmal nocturnal dyspnea.   Respiratory: Negative for cough, shortness of breath, sputum production and wheezing.    Endocrine: Negative for cold intolerance, heat intolerance and polyuria.   Hematologic/Lymphatic: Negative for adenopathy. Does not bruise/bleed easily.   Skin: Negative for dry skin, itching and suspicious lesions.   Musculoskeletal: Negative for arthritis, back pain, falls, joint pain, muscle weakness and myalgias.   Gastrointestinal: Negative for abdominal pain, constipation, diarrhea, dysphagia and hematemesis.   Genitourinary: Negative for bladder incontinence, dysuria and frequency.   Neurological: Positive for headaches and weakness. Negative for aphonia, disturbances in coordination and dizziness.   Psychiatric/Behavioral: Negative for altered mental status, depression, memory loss and substance abuse. The  patient does not have insomnia and is not nervous/anxious.          Medications:     Allergies: No Known Allergies    Current Meds:   Current Facility-Administered Medications:   •  acetaminophen (TYLENOL) tablet 650 mg, 650 mg, Oral, Q4H PRN **OR** acetaminophen (TYLENOL) suppository 650 mg, 650 mg, Rectal, Q4H PRN, Mono Madsen MD  •  aspirin chewable tablet 81 mg, 81 mg, Oral, Daily, Mono Madsen MD  •  atorvastatin (LIPITOR) tablet 20 mg, 20 mg, Oral, Nightly, Mono Madsen MD  •  sennosides-docusate (PERICOLACE) 8.6-50 MG per tablet 2 tablet, 2 tablet, Oral, Daily **AND** polyethylene glycol (MIRALAX) packet 17 g, 17 g, Oral, Daily PRN **AND** bisacodyl (DULCOLAX) EC tablet 5 mg, 5 mg, Oral, Daily PRN **AND** bisacodyl (DULCOLAX) suppository 10 mg, 10 mg, Rectal, Daily PRN, Mono Madsen MD  •  dextrose (D50W) (25 g/50 mL) IV injection 25 g, 25 g, Intravenous, Q15 Min PRN, Mono Madsen MD  •  dextrose (GLUTOSE) oral gel 15 g, 15 g, Oral, Q15 Min PRN, Mono Madsen MD  •  dilTIAZem (CARDIZEM) 125 mg in 125 mL 0.7% sodium chloride  infusion, 5-15 mg/hr, Intravenous, Titrated, Mono Madsen MD  •  dilTIAZem CD (CARDIZEM CD) 24 hr capsule 240 mg, 240 mg, Oral, Q24H, Mono Madsen MD  •  famotidine (PEPCID) tablet 40 mg, 40 mg, Oral, Daily, Mono Madsen MD  •  gabapentin (NEURONTIN) capsule 300 mg, 300 mg, Oral, TID, Mono Madsen MD  •  glucagon (human recombinant) (GLUCAGEN DIAGNOSTIC) injection 1 mg, 1 mg, Intramuscular, Q15 Min PRN, Mono Madsen MD  •  HYDROcodone-acetaminophen (NORCO)  MG per tablet 1 tablet, 1 tablet, Oral, 4x Daily PRN, Latoya Solorio APRN  •  insulin detemir (LEVEMIR) injection 35 Units, 35 Units, Subcutaneous, Nightly, Latoya Solorio APRN  •  Insulin Lispro (humaLOG) injection 0-14 Units, 0-14 Units, Subcutaneous, 4x Daily AC & at Bedtime, Mono Madsen MD  •   "isosorbide mononitrate (IMDUR) 24 hr tablet 60 mg, 60 mg, Oral, Q24H, Mono Madsen MD  •  labetalol (NORMODYNE,TRANDATE) injection 10 mg, 10 mg, Intravenous, Q6H PRN, Mono Madsen MD  •  levETIRAcetam (KEPPRA) tablet 500 mg, 500 mg, Oral, BID, Mono Madsen MD  •  lisinopril (PRINIVIL,ZESTRIL) tablet 40 mg, 40 mg, Oral, Daily, Mono Madsen MD  •  metoprolol tartrate (LOPRESSOR) tablet 50 mg, 50 mg, Oral, Q12H, Mono Madsen MD  •  nicotine (NICODERM CQ) 14 MG/24HR patch 1 patch, 1 patch, Transdermal, Q24H, Latoya Solorio APRN  •  ondansetron (ZOFRAN) tablet 4 mg, 4 mg, Oral, Q6H PRN **OR** ondansetron (ZOFRAN) injection 4 mg, 4 mg, Intravenous, Q6H PRN, Mono Madsen MD  •  saccharomyces boulardii (FLORASTOR) capsule 250 mg, 250 mg, Oral, BID, Mono Madsen MD  •  sodium chloride 0.9 % flush 10 mL, 10 mL, Intravenous, Q12H, Mono Madsen MD  •  sodium chloride 0.9 % flush 10 mL, 10 mL, Intravenous, PRN, Mono Madsen MD  •  sotalol (BETAPACE) tablet 120 mg, 120 mg, Oral, Q12H, Mono Madsen MD  •  ziprasidone (GEODON) injection 5 mg, 5 mg, Intramuscular, Daily PRN, Mono Madsen MD    Data:     Code Status:    There are no questions and answers to display.       Family History   Problem Relation Age of Onset   • Cancer Mother         liver   • Heart disease Father        Social History     Socioeconomic History   • Marital status:    Tobacco Use   • Smoking status: Current Every Day Smoker     Packs/day: 0.50   • Smokeless tobacco: Never Used   Vaping Use   • Vaping Use: Never used   Substance and Sexual Activity   • Alcohol use: Never   • Drug use: Never   • Sexual activity: Defer       Vitals:  Ht 177.8 cm (70\")   Wt 116 kg (256 lb 8 oz)   BMI 36.80 kg/m²   T 98.3 P 64 R 20 /63 Sp02 93% (room air)    I/O (24Hr):  No intake or output data in the 24 hours ending 07/23/22 1311    Labs and " imaging:      Recent Results (from the past 12 hour(s))   POC Glucose Once    Collection Time: 07/23/22  7:51 AM    Specimen: Blood   Result Value Ref Range    Glucose 102 70 - 130 mg/dL   POC Glucose Once    Collection Time: 07/23/22 11:12 AM    Specimen: Blood   Result Value Ref Range    Glucose 172 (H) 70 - 130 mg/dL         Physical Examination:        Physical Exam  Vitals and nursing note reviewed.   Constitutional:       Appearance: He is well-developed.   HENT:      Head: Normocephalic and atraumatic.      Right Ear: External ear normal.      Left Ear: External ear normal.      Nose: Nose normal.      Mouth/Throat:      Mouth: Mucous membranes are dry.      Pharynx: Oropharynx is clear.   Eyes:      Pupils: Pupils are equal, round, and reactive to light.   Cardiovascular:      Rate and Rhythm: Normal rate. Rhythm irregular.      Pulses: Normal pulses.      Heart sounds: Normal heart sounds.   Pulmonary:      Effort: Pulmonary effort is normal.      Breath sounds: Normal breath sounds.   Abdominal:      General: Bowel sounds are normal.      Palpations: Abdomen is soft.      Comments: obese   Musculoskeletal:         General: Normal range of motion.      Cervical back: Normal range of motion and neck supple.      Comments: Generalized weakness   Skin:     General: Skin is warm and dry.   Neurological:      Mental Status: He is alert and oriented to person, place, and time.      Deep Tendon Reflexes: Reflexes are normal and symmetric.      Comments: Intermittent confusion   Psychiatric:         Behavior: Behavior normal.           Assessment:           * No active hospital problems. *    Past Medical History:   Diagnosis Date   • Brain tumor (HCC)    • Coronary artery disease    • COVID-19 vaccine series completed     MODERNA X 3; LAST DOSE 3/2022   • Diabetes (HCC)    • Erectile dysfunction    • GERD (gastroesophageal reflux disease)    • Hypercholesteremia    • Hypertension         Plan:        1. Serratia  marcescens epidural infection  2. Atrial fibrillation with RVR  3. DM2 with hyperglycemia on long term insulin  4. HTN  5. Obesity  6. HLD  7. GERD  8. Hypokalemia    Continue current treatment. Monitor counts. Increase activity. Labs Monday.  Aggressive therapies as tolerated. Maintain patient safety.  IV antibiotics completed, under direction of ID through 7/22. Continue heart rate and rhythm control efforts - continue home dose betapace and cardizem. Continue pain control efforts.  Glycemic control - adjust insulin regimen. Continue tobacco cessation efforts and nicotine replacement.     Electronically signed by ADITYA Short on 7/23/2022 at 13:11 CDT

## 2022-07-23 NOTE — PROGRESS NOTES
"Infectious Diseases Progress Note    Patient:  Elijah Escobar  YOB: 1955  MRN: [id number]   Admit date: 7/11/2022   Admitting Physician: Mono Madsen MD  Primary Care Physician: Dylan Lama APRN    Chief Complaint/Interval History: He seems to be doing okay.  No diarrhea or rash.  He is without fever.  He denies headache.  No new neurological symptoms.  He completed IV antibiotic therapy. He had 3 weeks of therapy following surgical irrigation and debridement.  His PICC line has been removed.    No intake or output data in the 24 hours ending 07/23/22 0924  Allergies: No Known Allergies  Current Scheduled Medications:   aspirin, 81 mg, Oral, Daily  atorvastatin, 20 mg, Oral, Nightly  dilTIAZem CD, 240 mg, Oral, Q24H  famotidine, 40 mg, Oral, Daily  gabapentin, 300 mg, Oral, TID  insulin detemir, 35 Units, Subcutaneous, Nightly  insulin lispro, 0-14 Units, Subcutaneous, 4x Daily AC & at Bedtime  isosorbide mononitrate, 60 mg, Oral, Q24H  levETIRAcetam, 500 mg, Oral, BID  lisinopril, 40 mg, Oral, Daily  metoprolol tartrate, 50 mg, Oral, Q12H  nicotine, 1 patch, Transdermal, Q24H  saccharomyces boulardii, 250 mg, Oral, BID  senna-docusate sodium, 2 tablet, Oral, Daily  sodium chloride, 10 mL, Intravenous, Q12H  sotalol, 120 mg, Oral, Q12H      Current PRN Medications:  •  acetaminophen **OR** acetaminophen  •  senna-docusate sodium **AND** polyethylene glycol **AND** bisacodyl **AND** bisacodyl  •  dextrose  •  dextrose  •  glucagon (human recombinant)  •  HYDROcodone-acetaminophen  •  labetalol  •  ondansetron **OR** ondansetron  •  sodium chloride  •  ziprasidone    Review of Systems    Vital Signs:  Ht 177.8 cm (70\")   Wt 116 kg (256 lb 8 oz)   BMI 36.80 kg/m²     Physical Exam  Vital signs - reviewed.  No IV access currently  Craniotomy site well-healed.  No discoloration, warmth, induration, tenderness, or drainage.    Lab Results:  CBC: Results from last 7 days   Lab Units " 07/21/22  0543   WBC 10*3/mm3 7.77   HEMOGLOBIN g/dL 9.8*   HEMATOCRIT % 30.9*   PLATELETS 10*3/mm3 252     BMP:  Results from last 7 days   Lab Units 07/22/22  0629 07/21/22  0543   SODIUM mmol/L 140 141   POTASSIUM mmol/L 3.7 3.4*   CHLORIDE mmol/L 106 106   CO2 mmol/L 21.0* 24.0   BUN mg/dL 7* 6*   CREATININE mg/dL 0.75* 0.71*   GLUCOSE mg/dL 140* 119*   CALCIUM mg/dL 8.7 8.5*   ALT (SGPT) U/L  --  12     Component   Ref Range & Units 2 d ago 4 d ago 10 d ago 2 wk ago 3 wk ago 1 mo ago   C-Reactive Protein   0.00 - 0.50 mg/dL 0.41  0.86 High   1.73 High   5.93 High   5.38 High   <0.     Impression:   1.  Subgaleal/epidural infection with Serratia-he has completed 3 weeks of intravenous antibiotic therapy following irrigation, drainage, and debridement.  He is without fever or headache.  Operative incision appears to be well-healed.  His CRP was normal.  2.  Resolved acute renal injury  3.  Diabetes mellitus  4.  Coronary artery disease    Recommendations:   Feel it is reasonable to continue off antibiotic treatment at this time  Would recommend reviewing with surgery there recommended timing on repeating any CNS imaging  Will sign off for now  I would be happy to reassess if recurrent headache, fever, change in mental status, or other concerns for infection  Otherwise I would like to see him back in 2 to 3 weeks to make sure he remains stable from infectious diseases standpoint  Orders for follow-up appointment placed in chart  Please call if new questions or problems    Edwin Jeffers MD

## 2022-07-24 LAB
GLUCOSE BLDC GLUCOMTR-MCNC: 183 MG/DL (ref 70–130)
GLUCOSE BLDC GLUCOMTR-MCNC: 209 MG/DL (ref 70–130)
GLUCOSE BLDC GLUCOMTR-MCNC: 217 MG/DL (ref 70–130)
GLUCOSE BLDC GLUCOMTR-MCNC: 242 MG/DL (ref 70–130)

## 2022-07-24 PROCEDURE — 82962 GLUCOSE BLOOD TEST: CPT

## 2022-07-24 PROCEDURE — 63710000001 INSULIN LISPRO (HUMAN) PER 5 UNITS: Performed by: INTERNAL MEDICINE

## 2022-07-24 PROCEDURE — 63710000001 INSULIN DETEMIR PER 5 UNITS: Performed by: NURSE PRACTITIONER

## 2022-07-24 NOTE — PROGRESS NOTES
FARSHAD Salamanca APRN        Internal Medicine Progress Note    7/24/2022   12:08 CDT    Name:  Elijah Escobar  MRN:    [id number]     Acct:     [id number]   Room:  [address] [place] [address] Day: 0     Admit Date: 7/11/2022  3:17 PM  PCP: Dylan Lama APRN    Subjective:     C/C: weakness, confusion    Interval History: Status:  Improved. Resting in the bed.  No family at bedside.  Afebrile. Progressing with therapies. 02 sats stable on room air.  Heart rates stable. Alert and oriented at present time, but increased confusion overnight.  Blood sugars improved. Potassium corrected. Counts otherwise stable.   Anxious for discharge.     Review of Systems   Constitutional: Positive for malaise/fatigue. Negative for chills, decreased appetite, weight gain and weight loss.   HENT: Negative for congestion, ear discharge, hoarse voice and tinnitus.    Eyes: Negative for blurred vision, discharge, visual disturbance and visual halos.   Cardiovascular: Negative for chest pain, claudication, dyspnea on exertion, irregular heartbeat, leg swelling, orthopnea and paroxysmal nocturnal dyspnea.   Respiratory: Negative for cough, shortness of breath, sputum production and wheezing.    Endocrine: Negative for cold intolerance, heat intolerance and polyuria.   Hematologic/Lymphatic: Negative for adenopathy. Does not bruise/bleed easily.   Skin: Negative for dry skin, itching and suspicious lesions.   Musculoskeletal: Negative for arthritis, back pain, falls, joint pain, muscle weakness and myalgias.   Gastrointestinal: Negative for abdominal pain, constipation, diarrhea, dysphagia and hematemesis.   Genitourinary: Negative for bladder incontinence, dysuria and frequency.   Neurological: Positive for headaches and weakness. Negative for aphonia, disturbances in coordination and dizziness.   Psychiatric/Behavioral: Negative for altered mental status, depression, memory loss and substance abuse. The  patient does not have insomnia and is not nervous/anxious.          Medications:     Allergies: No Known Allergies    Current Meds:   Current Facility-Administered Medications:   •  acetaminophen (TYLENOL) tablet 650 mg, 650 mg, Oral, Q4H PRN **OR** acetaminophen (TYLENOL) suppository 650 mg, 650 mg, Rectal, Q4H PRN, Mono Madsen MD  •  aspirin chewable tablet 81 mg, 81 mg, Oral, Daily, Mono Madsen MD  •  atorvastatin (LIPITOR) tablet 20 mg, 20 mg, Oral, Nightly, Mono Madsen MD  •  sennosides-docusate (PERICOLACE) 8.6-50 MG per tablet 2 tablet, 2 tablet, Oral, Daily **AND** polyethylene glycol (MIRALAX) packet 17 g, 17 g, Oral, Daily PRN **AND** bisacodyl (DULCOLAX) EC tablet 5 mg, 5 mg, Oral, Daily PRN **AND** bisacodyl (DULCOLAX) suppository 10 mg, 10 mg, Rectal, Daily PRN, Mono Madsen MD  •  dextrose (D50W) (25 g/50 mL) IV injection 25 g, 25 g, Intravenous, Q15 Min PRN, Mono Madsen MD  •  dextrose (GLUTOSE) oral gel 15 g, 15 g, Oral, Q15 Min PRN, Mono Madsen MD  •  dilTIAZem (CARDIZEM) 125 mg in 125 mL 0.7% sodium chloride  infusion, 5-15 mg/hr, Intravenous, Titrated, Mono Madsen MD  •  dilTIAZem CD (CARDIZEM CD) 24 hr capsule 240 mg, 240 mg, Oral, Q24H, Mono Madsen MD  •  famotidine (PEPCID) tablet 40 mg, 40 mg, Oral, Daily, Mono Madsen MD  •  gabapentin (NEURONTIN) capsule 300 mg, 300 mg, Oral, TID, Mono Madsen MD  •  glucagon (human recombinant) (GLUCAGEN DIAGNOSTIC) injection 1 mg, 1 mg, Intramuscular, Q15 Min PRN, Mono Madsen MD  •  HYDROcodone-acetaminophen (NORCO)  MG per tablet 1 tablet, 1 tablet, Oral, 4x Daily PRN, Latoya Solorio APRN  •  insulin detemir (LEVEMIR) injection 35 Units, 35 Units, Subcutaneous, Nightly, Latoya Solorio APRN  •  Insulin Lispro (humaLOG) injection 0-14 Units, 0-14 Units, Subcutaneous, 4x Daily AC & at Bedtime, Mono Madsen MD  •   "isosorbide mononitrate (IMDUR) 24 hr tablet 60 mg, 60 mg, Oral, Q24H, Mono Madsen MD  •  labetalol (NORMODYNE,TRANDATE) injection 10 mg, 10 mg, Intravenous, Q6H PRN, Mono Madsen MD  •  levETIRAcetam (KEPPRA) tablet 500 mg, 500 mg, Oral, BID, Mono Madsen MD  •  lisinopril (PRINIVIL,ZESTRIL) tablet 40 mg, 40 mg, Oral, Daily, Mono Madsen MD  •  metoprolol tartrate (LOPRESSOR) tablet 50 mg, 50 mg, Oral, Q12H, Mono Madsen MD  •  nicotine (NICODERM CQ) 14 MG/24HR patch 1 patch, 1 patch, Transdermal, Q24H, Latoya Solorio APRN  •  ondansetron (ZOFRAN) tablet 4 mg, 4 mg, Oral, Q6H PRN **OR** ondansetron (ZOFRAN) injection 4 mg, 4 mg, Intravenous, Q6H PRN, Mono Madsen MD  •  saccharomyces boulardii (FLORASTOR) capsule 250 mg, 250 mg, Oral, BID, Mono Madsen MD  •  sodium chloride 0.9 % flush 10 mL, 10 mL, Intravenous, Q12H, Mono Madsen MD  •  sodium chloride 0.9 % flush 10 mL, 10 mL, Intravenous, PRN, Mono Madsen MD  •  sotalol (BETAPACE) tablet 120 mg, 120 mg, Oral, Q12H, Mono Madsen MD  •  ziprasidone (GEODON) injection 5 mg, 5 mg, Intramuscular, Daily PRN, Mono Madsen MD    Data:     Code Status:    There are no questions and answers to display.       Family History   Problem Relation Age of Onset   • Cancer Mother         liver   • Heart disease Father        Social History     Socioeconomic History   • Marital status:    Tobacco Use   • Smoking status: Current Every Day Smoker     Packs/day: 0.50   • Smokeless tobacco: Never Used   Vaping Use   • Vaping Use: Never used   Substance and Sexual Activity   • Alcohol use: Never   • Drug use: Never   • Sexual activity: Defer       Vitals:  Ht 177.8 cm (70\")   Wt 116 kg (256 lb 8 oz)   BMI 36.80 kg/m²   T 97.4 P 73 R 18 /70 Sp02 100% (room air)    I/O (24Hr):  No intake or output data in the 24 hours ending 07/24/22 1208    Labs and " imaging:      Recent Results (from the past 12 hour(s))   POC Glucose Once    Collection Time: 07/24/22  7:36 AM    Specimen: Blood   Result Value Ref Range    Glucose 242 (H) 70 - 130 mg/dL   POC Glucose Once    Collection Time: 07/24/22 11:22 AM    Specimen: Blood   Result Value Ref Range    Glucose 183 (H) 70 - 130 mg/dL         Physical Examination:        Physical Exam  Vitals and nursing note reviewed.   Constitutional:       Appearance: He is well-developed.   HENT:      Head: Normocephalic and atraumatic.      Right Ear: External ear normal.      Left Ear: External ear normal.      Nose: Nose normal.      Mouth/Throat:      Mouth: Mucous membranes are dry.      Pharynx: Oropharynx is clear.   Eyes:      Pupils: Pupils are equal, round, and reactive to light.   Cardiovascular:      Rate and Rhythm: Normal rate.      Pulses: Normal pulses.      Heart sounds: Normal heart sounds.   Pulmonary:      Effort: Pulmonary effort is normal.      Breath sounds: Normal breath sounds.   Abdominal:      General: Bowel sounds are normal.      Palpations: Abdomen is soft.      Comments: obese   Musculoskeletal:         General: Normal range of motion.      Cervical back: Normal range of motion and neck supple.      Comments: Generalized weakness   Skin:     General: Skin is warm and dry.   Neurological:      Mental Status: He is alert and oriented to person, place, and time.      Deep Tendon Reflexes: Reflexes are normal and symmetric.      Comments: Intermittent confusion   Psychiatric:         Behavior: Behavior normal.           Assessment:           * No active hospital problems. *    Past Medical History:   Diagnosis Date   • Brain tumor (HCC)    • Coronary artery disease    • COVID-19 vaccine series completed     MODERNA X 3; LAST DOSE 3/2022   • Diabetes (HCC)    • Erectile dysfunction    • GERD (gastroesophageal reflux disease)    • Hypercholesteremia    • Hypertension         Plan:        1. Serratia marcescens  epidural infection  2. Atrial fibrillation with RVR  3. DM2 with hyperglycemia on long term insulin  4. HTN  5. Obesity  6. HLD  7. GERD  8. Hypokalemia    Continue current treatment. Monitor counts. Increase activity. Labs Monday.  Aggressive therapies as tolerated. Maintain patient safety.  IV antibiotics completed, under direction of ID through 7/22. Continue heart rate and rhythm control efforts - continue home dose betapace and cardizem. Continue pain control efforts.  Glycemic control - adjust insulin regimen. Continue tobacco cessation efforts and nicotine replacement.     Electronically signed by ADITYA Short on 7/24/2022 at 12:08 CDT

## 2022-07-25 VITALS — WEIGHT: 255.6 LBS | HEIGHT: 70 IN | BODY MASS INDEX: 36.59 KG/M2

## 2022-07-25 LAB
ANION GAP SERPL CALCULATED.3IONS-SCNC: 9 MMOL/L (ref 5–15)
BUN SERPL-MCNC: 12 MG/DL (ref 8–23)
BUN/CREAT SERPL: 14.1 (ref 7–25)
CALCIUM SPEC-SCNC: 8.8 MG/DL (ref 8.6–10.5)
CHLORIDE SERPL-SCNC: 104 MMOL/L (ref 98–107)
CO2 SERPL-SCNC: 25 MMOL/L (ref 22–29)
CREAT SERPL-MCNC: 0.85 MG/DL (ref 0.76–1.27)
DEPRECATED RDW RBC AUTO: 46.2 FL (ref 37–54)
EGFRCR SERPLBLD CKD-EPI 2021: 95.8 ML/MIN/1.73
ERYTHROCYTE [DISTWIDTH] IN BLOOD BY AUTOMATED COUNT: 13.5 % (ref 12.3–15.4)
GLUCOSE BLDC GLUCOMTR-MCNC: 158 MG/DL (ref 70–130)
GLUCOSE BLDC GLUCOMTR-MCNC: 241 MG/DL (ref 70–130)
GLUCOSE BLDC GLUCOMTR-MCNC: 267 MG/DL (ref 70–130)
GLUCOSE SERPL-MCNC: 258 MG/DL (ref 65–99)
HCT VFR BLD AUTO: 34.9 % (ref 37.5–51)
HGB BLD-MCNC: 11.2 G/DL (ref 13–17.7)
MCH RBC QN AUTO: 30.6 PG (ref 26.6–33)
MCHC RBC AUTO-ENTMCNC: 32.1 G/DL (ref 31.5–35.7)
MCV RBC AUTO: 95.4 FL (ref 79–97)
PLATELET # BLD AUTO: 216 10*3/MM3 (ref 140–450)
PMV BLD AUTO: 10 FL (ref 6–12)
POTASSIUM SERPL-SCNC: 3.9 MMOL/L (ref 3.5–5.2)
RBC # BLD AUTO: 3.66 10*6/MM3 (ref 4.14–5.8)
SODIUM SERPL-SCNC: 138 MMOL/L (ref 136–145)
WBC NRBC COR # BLD: 8.58 10*3/MM3 (ref 3.4–10.8)

## 2022-07-25 PROCEDURE — 80048 BASIC METABOLIC PNL TOTAL CA: CPT | Performed by: INTERNAL MEDICINE

## 2022-07-25 PROCEDURE — 63710000001 INSULIN LISPRO (HUMAN) PER 5 UNITS: Performed by: INTERNAL MEDICINE

## 2022-07-25 PROCEDURE — 85027 COMPLETE CBC AUTOMATED: CPT | Performed by: INTERNAL MEDICINE

## 2022-07-25 PROCEDURE — 82962 GLUCOSE BLOOD TEST: CPT

## 2022-07-25 PROCEDURE — 63710000001 INSULIN DETEMIR PER 5 UNITS: Performed by: NURSE PRACTITIONER

## 2022-07-25 NOTE — DISCHARGE SUMMARY
FARSHAD Saalmanca APRN      Internal Medicine Discharge Summary    Patient ID: Elijah Escobar  MRN: [id number]     Acct:  [id number]       Patient's PCP: Dylan Lama APRN    Admit Date: 7/11/2022     Discharge Date:   7/25/22    Admitting Physician: Mono Madsen MD    Discharge Physician: ADITYA Marino     Active Discharge Diagnoses:  Serratia marcescens epidural infection  Atrial fibrillation with RVR  DM2 with hyperglycemia on long term insulin  HTN  Obesity  HLD  GERD  Hypokalemia      Hospital Problems    * No active hospital problems. *     Past Medical History:   Diagnosis Date    Brain tumor (HCC)     Coronary artery disease     COVID-19 vaccine series completed     MODERNA X 3; LAST DOSE 3/2022    Diabetes (HCC)     Erectile dysfunction     GERD (gastroesophageal reflux disease)     Hypercholesteremia     Hypertension        The patient was seen and examined on the day of discharge and this discharge summary is in conjunction with any daily progress note from day of discharge.    Code Status:    There are no questions and answers to display.       Hospital Course: Elijah Escobar is a  66 y.o.  male who presents with need for continued rehabilitation efforts following recent acute care stay. The patient had been in his usual state of health when he developed increased right side headache with dizziness. He underwent recent craniotomy for meningioma on 6/16 per neurosurgery. His post-operative course was somewhat complicated by atrial fibrillation with RVR. He discharged to home and returned on 6/24 and underwent CT that showed expected post-surgical changes with pneumocephalus and vasogenic edema and left to right midline shift. Further workup revealed evidence UTI as well as atrial fibrillation with RVR. He was treated with antibiotics and rate control medications. He discharged to home and returned on 6/30 with complaints  of right side headache and dizziness following a fall without head trauma.  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 Cultures returned positive for serratia marcescens. The patient was seen in consultation by ID for antibiotic management.   While at our facility, he has progressed with therapies. He requires minimal assistance with ADLs, but does have some issues with impulsivity and safety awareness. He has completed IV antibiotic therapy under the direction of ID. Counts have remained stable. He had issues with atrial fibrillation with RVR that improved upon resuming his home medications. He has maintained adequate 02 sats on room air. At this point, the patient is felt stable for discharge to home with the assistance of family and home health with close outpatient follow up.     Consults: Dr. Santos (ID)     Disposition: home health     Physical Exam  Vitals and nursing note reviewed.   Constitutional:       Appearance: He is well-developed.   HENT:      Head: Normocephalic and atraumatic.      Right Ear: External ear normal.      Left Ear: External ear normal.      Nose: Nose normal.      Mouth/Throat:      Mouth: Mucous membranes are dry.      Pharynx: Oropharynx is clear.   Eyes:      Pupils: Pupils are equal, round, and reactive to light.   Cardiovascular:      Rate and Rhythm: Normal rate. Rhythm irregular.      Pulses: Normal pulses.      Heart sounds: Normal heart sounds.   Pulmonary:      Effort: Pulmonary effort is normal.      Breath sounds: Normal breath sounds.   Abdominal:      General: Bowel sounds are normal.      Palpations: Abdomen is soft.      Comments: obese   Musculoskeletal:         General: Normal range of motion.      Cervical back: Normal range of motion and neck supple.      Comments: Generalized weakness   Skin:     General: Skin is warm and dry.    Neurological:      Mental Status: He is alert and oriented to person, place, and time.      Deep Tendon Reflexes: Reflexes are normal and symmetric.      Comments: Intermittent confusion   Psychiatric:         Behavior: Behavior normal.     Discharged Condition: Stable    Follow Up: PCP 1 week  ID 2-3 weeks  Neurosurgery 2 weeks    Diet: Diet Regular; Thin; Cardiac, Consistent Carbohydrate    Discharge Medications:   See computer generated medication reconciliation form    Time Spent on discharge is  32 minutes in patient examination, evaluation, patient/family counseling as well as medication reconciliation, prescriptions for required medications, discharge plan and follow up.     Electronically signed by ADITYA Marino on 7/25/2022 at 09:36 CDT     I have discussed the care of Elijah Escobar, including pertinent history and exam findings, with the nurse practitioner.    I have seen and examined the patient and the key elements of all parts of the encounter have been performed by me.  I agree with the assessment, plan and orders as documented by ADITYA Cruz, after I modified the exam findings and the plan of treatments and the final version is my approved version of the assessment.        Electronically signed by Mono Madsen MD on 7/25/2022 at 21:53 CDT

## 2022-07-26 ENCOUNTER — TELEPHONE (OUTPATIENT)
Dept: NEUROSURGERY | Facility: CLINIC | Age: 67
End: 2022-07-26

## 2022-07-26 DIAGNOSIS — Z98.890 S/P CRANIOTOMY: Primary | ICD-10-CM

## 2022-07-26 NOTE — TELEPHONE ENCOUNTER
Spoke with patient, he is scheduled for CT this Friday at 2pm here at hospital. I explained it is for surgery planning and to order the cranioplasty plate. I also reiterated that he MUST be wearing his helmet at all times unless laying down in bed. He understood and states it is on his head currently.

## 2022-07-29 ENCOUNTER — TELEPHONE (OUTPATIENT)
Dept: NEUROSURGERY | Facility: CLINIC | Age: 67
End: 2022-07-29

## 2022-07-29 NOTE — TELEPHONE ENCOUNTER
Called patient after he no showed for his CT today, it is for his cranioplasty planning. I called and he said he did not have the gas to get to [facility] today. It is rescheduled for 8/2 at 1500 per his request. Vendor rep notified as well.

## 2022-08-02 ENCOUNTER — HOSPITAL ENCOUNTER (OUTPATIENT)
Dept: CT IMAGING | Facility: HOSPITAL | Age: 67
Discharge: HOME | End: 2022-08-02
Admitting: NURSE PRACTITIONER

## 2022-08-02 DIAGNOSIS — Z98.890 S/P CRANIOTOMY: ICD-10-CM

## 2022-08-02 PROCEDURE — 70450 CT HEAD/BRAIN W/O DYE: CPT

## 2022-08-04 NOTE — PROGRESS NOTES
[facility] picked up disc yesterday
Send to [facility] to make a cranial prosthesis.  Needs to be off abx for one month with CRP normal before implantation.  
gait, locomotion, and balance/muscle strength

## 2022-08-17 ENCOUNTER — TELEPHONE (OUTPATIENT)
Dept: NEUROSURGERY | Facility: CLINIC | Age: 67
End: 2022-08-17

## 2022-08-17 NOTE — TELEPHONE ENCOUNTER
Left vm at patients pcp office that we need to see patient. He did not show for his appt this morning. His son was contacted yesterday after multiple failed attempts to reach patient. His son said he would attempt to contact another family member that lives closer to the patient. Direct line given if they have any other contact information or if they have questions

## 2022-08-31 ENCOUNTER — HOSPITAL ENCOUNTER (OUTPATIENT)
Dept: MRI IMAGING | Facility: HOSPITAL | Age: 67
Discharge: HOME | End: 2022-08-31
Admitting: NURSE PRACTITIONER

## 2022-08-31 ENCOUNTER — OFFICE VISIT (OUTPATIENT)
Dept: NEUROSURGERY | Facility: CLINIC | Age: 67
End: 2022-08-31

## 2022-08-31 VITALS — HEIGHT: 70 IN | WEIGHT: 271 LBS | BODY MASS INDEX: 38.8 KG/M2

## 2022-08-31 DIAGNOSIS — Z98.890 S/P CRANIOTOMY: Primary | ICD-10-CM

## 2022-08-31 DIAGNOSIS — D32.9 MENINGIOMA: ICD-10-CM

## 2022-08-31 DIAGNOSIS — E66.01 CLASS 2 SEVERE OBESITY DUE TO EXCESS CALORIES WITH SERIOUS COMORBIDITY AND BODY MASS INDEX (BMI) OF 38.0 TO 38.9 IN ADULT: ICD-10-CM

## 2022-08-31 DIAGNOSIS — Z98.890 S/P CRANIOTOMY: ICD-10-CM

## 2022-08-31 DIAGNOSIS — F17.200 SMOKER: ICD-10-CM

## 2022-08-31 DIAGNOSIS — T14.8XXA WOUND INFECTION: ICD-10-CM

## 2022-08-31 DIAGNOSIS — L08.9 WOUND INFECTION: ICD-10-CM

## 2022-08-31 LAB — CREAT BLDA-MCNC: 0.8 MG/DL (ref 0.6–1.3)

## 2022-08-31 PROCEDURE — A9577 INJ MULTIHANCE: HCPCS | Performed by: NURSE PRACTITIONER

## 2022-08-31 PROCEDURE — 0 GADOBENATE DIMEGLUMINE 529 MG/ML SOLUTION: Performed by: NURSE PRACTITIONER

## 2022-08-31 PROCEDURE — 82565 ASSAY OF CREATININE: CPT

## 2022-08-31 PROCEDURE — 70553 MRI BRAIN STEM W/O & W/DYE: CPT

## 2022-08-31 PROCEDURE — 99024 POSTOP FOLLOW-UP VISIT: CPT | Performed by: NEUROLOGICAL SURGERY

## 2022-08-31 RX ADMIN — GADOBENATE DIMEGLUMINE 20 ML: 529 INJECTION, SOLUTION INTRAVENOUS at 14:56

## 2022-09-01 ENCOUNTER — TELEPHONE (OUTPATIENT)
Dept: NEUROSURGERY | Facility: CLINIC | Age: 67
End: 2022-09-01

## 2022-09-01 NOTE — TELEPHONE ENCOUNTER
I have called and rescheduled patients infectious disease appointment. It is on 9/19 at 2:10pm. I have contacted the patient and made him aware of the appointment as well as the address and phone number there. I will also mail him a later with the same information on it. I explained how important it is that he make this important. He expressed understanding.

## 2022-09-02 RX ORDER — CHLORHEXIDINE GLUCONATE 4 G/100ML
SOLUTION TOPICAL
Qty: 120 ML | Refills: 0 | Status: ON HOLD | OUTPATIENT
Start: 2022-09-02 | End: 2022-10-04

## 2022-09-19 ENCOUNTER — TELEPHONE (OUTPATIENT)
Dept: NEUROSURGERY | Facility: CLINIC | Age: 67
End: 2022-09-19

## 2022-09-19 NOTE — TELEPHONE ENCOUNTER
CALLED PCP OFFICE TO SET UP PATIENT SURGERY CLEARANCE APPT FOR CARNIOPLASTY ON 10/4.     NO ANSWER, LEFT VOICE MAIL.

## 2022-09-26 ENCOUNTER — HOSPITAL ENCOUNTER (OUTPATIENT)
Dept: GENERAL RADIOLOGY | Facility: HOSPITAL | Age: 67
Discharge: HOME | End: 2022-09-26

## 2022-09-26 ENCOUNTER — PRE-ADMISSION TESTING (OUTPATIENT)
Dept: PREADMISSION TESTING | Facility: HOSPITAL | Age: 67
End: 2022-09-26

## 2022-09-26 VITALS
HEIGHT: 70 IN | HEART RATE: 58 BPM | SYSTOLIC BLOOD PRESSURE: 139 MMHG | OXYGEN SATURATION: 100 % | DIASTOLIC BLOOD PRESSURE: 67 MMHG | BODY MASS INDEX: 35.79 KG/M2 | RESPIRATION RATE: 20 BRPM | WEIGHT: 250 LBS

## 2022-09-26 DIAGNOSIS — Z98.890 S/P CRANIOTOMY: ICD-10-CM

## 2022-09-26 LAB
ALBUMIN SERPL-MCNC: 4.3 G/DL (ref 3.5–5.2)
ALBUMIN/GLOB SERPL: 1.4 G/DL
ALP SERPL-CCNC: 115 U/L (ref 39–117)
ALT SERPL W P-5'-P-CCNC: 12 U/L (ref 1–41)
ANION GAP SERPL CALCULATED.3IONS-SCNC: 10 MMOL/L (ref 5–15)
AST SERPL-CCNC: 11 U/L (ref 1–40)
BILIRUB SERPL-MCNC: 0.4 MG/DL (ref 0–1.2)
BUN SERPL-MCNC: 10 MG/DL (ref 8–23)
BUN/CREAT SERPL: 13.2 (ref 7–25)
CALCIUM SPEC-SCNC: 9.3 MG/DL (ref 8.6–10.5)
CHLORIDE SERPL-SCNC: 101 MMOL/L (ref 98–107)
CO2 SERPL-SCNC: 26 MMOL/L (ref 22–29)
CREAT SERPL-MCNC: 0.76 MG/DL (ref 0.76–1.27)
CRP SERPL-MCNC: <0.3 MG/DL (ref 0–0.5)
DEPRECATED RDW RBC AUTO: 45.6 FL (ref 37–54)
EGFRCR SERPLBLD CKD-EPI 2021: 99.1 ML/MIN/1.73
ERYTHROCYTE [DISTWIDTH] IN BLOOD BY AUTOMATED COUNT: 13.2 % (ref 12.3–15.4)
ERYTHROCYTE [SEDIMENTATION RATE] IN BLOOD: 16 MM/HR (ref 0–20)
GLOBULIN UR ELPH-MCNC: 3 GM/DL
GLUCOSE SERPL-MCNC: 364 MG/DL (ref 65–99)
HCT VFR BLD AUTO: 43.3 % (ref 37.5–51)
HGB BLD-MCNC: 13.8 G/DL (ref 13–17.7)
MCH RBC QN AUTO: 30 PG (ref 26.6–33)
MCHC RBC AUTO-ENTMCNC: 31.9 G/DL (ref 31.5–35.7)
MCV RBC AUTO: 94.1 FL (ref 79–97)
PLATELET # BLD AUTO: 207 10*3/MM3 (ref 140–450)
PMV BLD AUTO: 11.3 FL (ref 6–12)
POTASSIUM SERPL-SCNC: 3.8 MMOL/L (ref 3.5–5.2)
PROT SERPL-MCNC: 7.3 G/DL (ref 6–8.5)
QT INTERVAL: 492 MS
QTC INTERVAL: 466 MS
RBC # BLD AUTO: 4.6 10*6/MM3 (ref 4.14–5.8)
SODIUM SERPL-SCNC: 137 MMOL/L (ref 136–145)
WBC NRBC COR # BLD: 8.48 10*3/MM3 (ref 3.4–10.8)

## 2022-09-26 PROCEDURE — 36415 COLL VENOUS BLD VENIPUNCTURE: CPT

## 2022-09-26 PROCEDURE — 85652 RBC SED RATE AUTOMATED: CPT

## 2022-09-26 PROCEDURE — 93005 ELECTROCARDIOGRAM TRACING: CPT

## 2022-09-26 PROCEDURE — 86140 C-REACTIVE PROTEIN: CPT

## 2022-09-26 PROCEDURE — 71045 X-RAY EXAM CHEST 1 VIEW: CPT

## 2022-09-26 PROCEDURE — 80053 COMPREHEN METABOLIC PANEL: CPT

## 2022-09-26 PROCEDURE — 85027 COMPLETE CBC AUTOMATED: CPT

## 2022-09-26 RX ORDER — SOTALOL HYDROCHLORIDE 80 MG/1
20 TABLET ORAL DAILY
Status: ON HOLD | COMMUNITY
End: 2022-10-05

## 2022-09-26 RX ORDER — LISINOPRIL 20 MG/1
20 TABLET ORAL DAILY
Status: ON HOLD | COMMUNITY
End: 2023-03-31

## 2022-09-26 RX ORDER — OMEPRAZOLE 20 MG/1
20 CAPSULE, DELAYED RELEASE ORAL DAILY
Status: ON HOLD | COMMUNITY

## 2022-09-26 RX ORDER — LEVETIRACETAM 500 MG/1
500 TABLET ORAL 2 TIMES DAILY
Status: ON HOLD | COMMUNITY

## 2022-09-26 NOTE — DISCHARGE INSTRUCTIONS
Before you come to the hospital        Arrival time: AS DIRECTED BY OFFICE     YOU MAY TAKE THE FOLLOWING MEDICATION(S) THE MORNING OF SURGERY WITH A SIP OF WATER: Metoprolol (lopressor), Hydrocodone (norco), Gabapentin (Neurontin), Levetiracetam (Keppra), Sotalol (Betapace)           DO NOT TAKE YOUR LISINOPRIL 24 HOURS PRIOR TO SURGERY          ALL OTHER HOME MEDICATION CHECK WITH YOUR PHYSICIAN (especially if you are taking diabetes medicines or blood thinners)      If you were given and instructed to use a germ- killing soap, use as directed the night before surgery and again the morning of surgery or as directed by your surgeon.    (See attached information for How to Use Chlorhexidine for Bathing if applicable.)            Eating and drinking restrictions prior to scheduled arrival time    2 Hours before arrival time STOP   Drinking Clear liquids (water, apple juice-no pulp)     6 Hours before arrival time STOP   Milk or drinks that contain milk, full liquids    6 Hours before arrival time STOP   Light meals or foods, such as toast or cereal    8 Hours before arrival time STOP   Heavy foods, such as meat, fried foods, or fatty foods    (It is extremely important that you follow these guidelines to prevent delay or cancelation of your procedure)     Clear Liquids  Water and flavored water                                                                      Clear Fruit juices, such as cranberry juice and apple juice.  Black coffee (NO cream of any kind, including powdered).  Plain tea  Clear bouillon or broth.  Flavored gelatin.  Soda.  Gatorade or Powerade.  Full liquid examples  Juices that have pulp.  Frozen ice pops that contain fruit pieces.  Coffee with creamer  Milk.  Yogurt.                MANAGING PAIN AFTER SURGERY    We know you are probably wondering what your pain will be like after surgery.  Following surgery it is unrealistic to expect you will not have pain.   Pain is how our bodies let us know  that something is wrong or cautions us to be careful.  That said, our goal is to make your pain tolerable.    Methods we may use to treat your pain include (oral or IV medications, PCAs, epidurals, nerve blocks, etc.)   While some procedures require IV pain medications for a short time after surgery, transitioning to pain medications by mouth allows for better management of pain.   Your nurse will encourage you to take oral pain medications whenever possible.  IV medications work almost immediately, but only last a short while.  Taking medications by mouth allows for a more constant level of medication in your blood stream for a longer period of time.      Once your pain is out of control it is harder to get back under control.  It is important you are aware when your next dose of pain medication is due.  If you are admitted, your nurse may write the time of your next dose on the white board in your room to help you remember.      We are interested in your pain and encourage you to inform us about aggravating factors during your visit.   Many times a simple repositioning every few hours can make a big difference.    If your physician says it is okay, do not let your pain prevent you from getting out of bed. Be sure to call your nurse for assistance prior to getting up so you do not fall.      Before surgery, please decide your tolerable pain goal.  These faces help describe the pain ratings we use on a 0-10 scale.   Be prepared to tell us your goal and whether or not you take pain or anxiety medications at home.          Preparing for Surgery  Preparing for surgery is an important part of your care. It can make things go more smoothly and help you avoid complications. The steps leading up to surgery may vary among hospitals. Follow all instructions given to you by your health care providers. Ask questions if you do not understand something. Talk about any concerns that you have.  Here are some questions to consider  asking before your surgery:  If my surgery is not an emergency (is elective), when would be the best time to have the surgery?  What arrangements do I need to make for work, home, or school?  What will my recovery be like? How long will it be before I can return to normal activities?  Will I need to prepare my home? Will I need to arrange care for me or my children?  Should I expect to have pain after surgery? What are my pain management options? Are there nonmedical options that I can try for pain?  Tell a health care provider about:  Any allergies you have.  All medicines you are taking, including vitamins, herbs, eye drops, creams, and over-the-counter medicines.  Any problems you or family members have had with anesthetic medicines.  Any blood disorders you have.  Any surgeries you have had.  Any medical conditions you have.  Whether you are pregnant or may be pregnant.  What are the risks?  The risks and complications of surgery depend on the specific procedure that you have. Discuss all the risks with your health care providers before your surgery. Ask about common surgical complications, which may include:  Infection.  Bleeding or a need for blood replacement (transfusion).  Allergic reactions to medicines.  Damage to surrounding nerves, tissues, or structures.  A blood clot.  Scarring.  Failure of the surgery to correct the problem.  Follow these instructions before the procedure:  Several days or weeks before your procedure  You may have a physical exam by your primary health care provider to make sure it is safe for you to have surgery.  You may have testing. This may include a chest X-ray, blood and urine tests, electrocardiogram (ECG), or other testing.  Ask your health care provider about:  Changing or stopping your regular medicines. This is especially important if you are taking diabetes medicines or blood thinners.  Taking medicines such as aspirin and ibuprofen. These medicines can thin your blood.  Do not take these medicines unless your health care provider tells you to take them.  Taking over-the-counter medicines, vitamins, herbs, and supplements.  Do not use any products that contain nicotine or tobacco, such as cigarettes and e-cigarettes. If you need help quitting, ask your health care provider.  Avoid alcohol.  Ask your health care provider if there are exercises you can do to prepare for surgery.  Eat a healthy diet.   Plan to have someone take you home from the hospital or clinic.  Plan to have a responsible adult care for you for at least 24 hours after you leave the hospital or clinic. This is important.  The day before your procedure  You may be given antibiotic medicine to take by mouth to help prevent infection. Take it as told by your health care provider.  You may be asked to shower with a germ-killing soap.  Follow instructions from your health care provider about eating and drinking restrictions. This includes gum, mints and hard candy.  Pack comfortable clothes according to your procedure.   The day of your procedure  You may need to take another shower with a germ-killing soap before you leave home in the morning.  With a small sip of water, take only the medicines that you are told to take.  Remove all jewelry including rings.   Leave anything you consider valuable at home except hearing aids if needed.  Do not wear any makeup, nail polish, powder, deodorant, lotion, hair accessories, or anything on your skin or body except your clothes.  If you will be staying in the hospital, bring a case to hold your glasses, contacts, or dentures. You may also want to bring your robe and non-skid footwear.  If you wear oxygen at home, bring it with you the day of surgery.  If instructed by your health care provider, bring your sleep apnea device with you on the day of your surgery (if this applies to you).  You may want to leave your suitcase and sleep apnea device in the car until after surgery.    Arrive at the hospital as scheduled.  Bring a friend or family member with you who can help to answer questions and be present while you meet with your health care provider.  At the hospital  When you arrive at the hospital:  Go to registration located at the main entrance of the hospital. You will be registered and given a beeper and a sticker sheet. Take the stickers to the Outpatient nurses desk and place in the black tray. This is to notify staff that you have arrived. Then return to the lobby to wait.   When your beeper lights up and vibrates proceed through the double doors, under the stairs, and a member of the Outpatient Surgery staff will escort you to your preoperative room.  You may have to wear compression sleeves. These help to prevent blood clots and reduce swelling in your legs.  An IV may be inserted into one of your veins.              In the operating room, you may be given one or more of the following:        A medicine to help you relax (sedative).        A medicine to numb the area (local anesthetic).        A medicine to make you fall asleep (general anesthetic).        A medicine that is injected into an area of your body to numb everything below the                      injection site (regional anesthetic).  You may be given an antibiotic through your IV to help prevent infection.  Your surgical site will be marked or identified.    Contact a health care provider if you:  Develop a fever of more than 100.4°F (38°C) or other feelings of illness during the 48 hours before your surgery.  Have symptoms that get worse.  Have questions or concerns about your surgery.  Summary  Preparing for surgery can make the procedure go more smoothly and lower your risk of complications.  Before surgery, make a list of questions and concerns to discuss with your surgeon. Ask about the risks and possible complications.  In the days or weeks before your surgery, follow all instructions from your health care  provider. You may need to stop smoking, avoid alcohol, follow eating restrictions, and change or stop your regular medicines.  Contact your surgeon if you develop a fever or other signs of illness during the few days before your surgery.  This information is not intended to replace advice given to you by your health care provider. Make sure you discuss any questions you have with your health care provider.  Document Revised: 12/21/2018 Document Reviewed: 10/23/2018  Elsevier Patient Education © 2021 [employer]

## 2022-10-03 NOTE — ED NOTES
Health Maintenance Due   Topic Date Due   • Hepatitis B Vaccine (1 of 3 - 3-dose series) Never done   • Pneumococcal Vaccine 65+ (1 - PCV) Never done   • DTaP/Tdap/Td Vaccine (2 - Td or Tdap) 11/21/2018   • Diabetes Eye Exam  12/23/2021   • COVID-19 Vaccine (4 - Booster for Moderna series) 12/27/2021   • Lung Cancer Screening  01/25/2022   • Influenza Vaccine (1) 09/01/2022       Patient is due for topics as listed above but is not proceeding with Immunization(s) Influenza at this time.    Pt states it is ok to give any information to his niece, Guadalupe, should she call. His wife is deaf and unable to get an update any other way.

## 2022-10-04 ENCOUNTER — HOSPITAL ENCOUNTER (INPATIENT)
Facility: HOSPITAL | Age: 67
LOS: 3 days | Discharge: HOME | End: 2022-10-07
Attending: NEUROLOGICAL SURGERY | Admitting: NEUROLOGICAL SURGERY

## 2022-10-04 ENCOUNTER — APPOINTMENT (OUTPATIENT)
Dept: CT IMAGING | Facility: HOSPITAL | Age: 67
End: 2022-10-04

## 2022-10-04 ENCOUNTER — ANESTHESIA EVENT (OUTPATIENT)
Dept: PERIOP | Facility: HOSPITAL | Age: 67
End: 2022-10-04

## 2022-10-04 ENCOUNTER — ANESTHESIA (OUTPATIENT)
Dept: PERIOP | Facility: HOSPITAL | Age: 67
End: 2022-10-04

## 2022-10-04 DIAGNOSIS — Z74.09 IMPAIRED MOBILITY: ICD-10-CM

## 2022-10-04 DIAGNOSIS — Z98.890 S/P CRANIOTOMY: ICD-10-CM

## 2022-10-04 DIAGNOSIS — I48.91 ATRIAL FIBRILLATION WITH RVR: ICD-10-CM

## 2022-10-04 DIAGNOSIS — I47.29 NSVT (NONSUSTAINED VENTRICULAR TACHYCARDIA): Primary | ICD-10-CM

## 2022-10-04 DIAGNOSIS — Z78.9 DECREASED ACTIVITIES OF DAILY LIVING (ADL): ICD-10-CM

## 2022-10-04 LAB
ABO GROUP BLD: NORMAL
APPEARANCE CSF: CLEAR
BLD GP AB SCN SERPL QL: NEGATIVE
COLOR CSF: COLORLESS
COLOR SPUN CSF: COLORLESS
GLUCOSE BLDC GLUCOMTR-MCNC: 190 MG/DL (ref 70–130)
GLUCOSE BLDC GLUCOMTR-MCNC: 194 MG/DL (ref 70–130)
GLUCOSE BLDC GLUCOMTR-MCNC: 258 MG/DL (ref 70–130)
GLUCOSE CSF-MCNC: 94 MG/DL (ref 40–70)
LYMPHOCYTES NFR CSF MANUAL: 72 %
METHOD: ABNORMAL
MONOCYTES NFR CSF MANUAL: 28 % (ref 15–45)
NUC CELL # CSF MANUAL: 12 /MM3 (ref 0–8)
PROT CSF-MCNC: 114.2 MG/DL (ref 15–45)
RBC # CSF MANUAL: 0 /MM3 (ref 0–0)
RH BLD: NEGATIVE
SPECIMEN VOL CSF: 7.5 ML
T&S EXPIRATION DATE: NORMAL
TUBE # CSF: 1

## 2022-10-04 PROCEDURE — C1713 ANCHOR/SCREW BN/BN,TIS/BN: HCPCS | Performed by: NEUROLOGICAL SURGERY

## 2022-10-04 PROCEDURE — 25010000002 PROPOFOL 10 MG/ML EMULSION: Performed by: NURSE ANESTHETIST, CERTIFIED REGISTERED

## 2022-10-04 PROCEDURE — 86592 SYPHILIS TEST NON-TREP QUAL: CPT | Performed by: NEUROLOGICAL SURGERY

## 2022-10-04 PROCEDURE — 84157 ASSAY OF PROTEIN OTHER: CPT | Performed by: NEUROLOGICAL SURGERY

## 2022-10-04 PROCEDURE — 82962 GLUCOSE BLOOD TEST: CPT

## 2022-10-04 PROCEDURE — S0260 H&P FOR SURGERY: HCPCS | Performed by: NEUROLOGICAL SURGERY

## 2022-10-04 PROCEDURE — C1729 CATH, DRAINAGE: HCPCS | Performed by: NEUROLOGICAL SURGERY

## 2022-10-04 PROCEDURE — 25010000002 FENTANYL CITRATE (PF) 50 MCG/ML SOLUTION: Performed by: ANESTHESIOLOGY

## 2022-10-04 PROCEDURE — 25010000002 VANCOMYCIN 10 G RECONSTITUTED SOLUTION: Performed by: NURSE PRACTITIONER

## 2022-10-04 PROCEDURE — 25010000002 SODIUM CHLORIDE 0.9 % WITH KCL 20 MEQ 20-0.9 MEQ/L-% SOLUTION: Performed by: NURSE PRACTITIONER

## 2022-10-04 PROCEDURE — 63710000001 INSULIN LISPRO (HUMAN) PER 5 UNITS: Performed by: NURSE PRACTITIONER

## 2022-10-04 PROCEDURE — 62140 CRNOP SKULL DEFECT<5 CM DIAM: CPT | Performed by: NEUROLOGICAL SURGERY

## 2022-10-04 PROCEDURE — 86901 BLOOD TYPING SEROLOGIC RH(D): CPT | Performed by: NEUROLOGICAL SURGERY

## 2022-10-04 PROCEDURE — 94761 N-INVAS EAR/PLS OXIMETRY MLT: CPT

## 2022-10-04 PROCEDURE — 94799 UNLISTED PULMONARY SVC/PX: CPT

## 2022-10-04 PROCEDURE — 0NR10JZ REPLACEMENT OF FRONTAL BONE WITH SYNTHETIC SUBSTITUTE, OPEN APPROACH: ICD-10-PCS | Performed by: NEUROLOGICAL SURGERY

## 2022-10-04 PROCEDURE — 87015 SPECIMEN INFECT AGNT CONCNTJ: CPT | Performed by: NEUROLOGICAL SURGERY

## 2022-10-04 PROCEDURE — 82945 GLUCOSE OTHER FLUID: CPT | Performed by: NEUROLOGICAL SURGERY

## 2022-10-04 PROCEDURE — 70450 CT HEAD/BRAIN W/O DYE: CPT

## 2022-10-04 PROCEDURE — 87205 SMEAR GRAM STAIN: CPT | Performed by: NEUROLOGICAL SURGERY

## 2022-10-04 PROCEDURE — 25010000002 VANCOMYCIN 1 G RECONSTITUTED SOLUTION 1 EACH VIAL: Performed by: NEUROLOGICAL SURGERY

## 2022-10-04 PROCEDURE — 009U30Z DRAINAGE OF SPINAL CANAL WITH DRAINAGE DEVICE, PERCUTANEOUS APPROACH: ICD-10-PCS | Performed by: NEUROLOGICAL SURGERY

## 2022-10-04 PROCEDURE — 86850 RBC ANTIBODY SCREEN: CPT | Performed by: NEUROLOGICAL SURGERY

## 2022-10-04 PROCEDURE — 87070 CULTURE OTHR SPECIMN AEROBIC: CPT | Performed by: NEUROLOGICAL SURGERY

## 2022-10-04 PROCEDURE — 25010000002 FENTANYL CITRATE (PF) 100 MCG/2ML SOLUTION: Performed by: NURSE ANESTHETIST, CERTIFIED REGISTERED

## 2022-10-04 PROCEDURE — 99024 POSTOP FOLLOW-UP VISIT: CPT | Performed by: NURSE PRACTITIONER

## 2022-10-04 PROCEDURE — 25010000002 ONDANSETRON PER 1 MG: Performed by: NURSE ANESTHETIST, CERTIFIED REGISTERED

## 2022-10-04 PROCEDURE — 62272 THER SPI PNXR DRG CSF: CPT | Performed by: NEUROLOGICAL SURGERY

## 2022-10-04 PROCEDURE — 25010000002 VANCOMYCIN 1 G RECONSTITUTED SOLUTION: Performed by: NEUROLOGICAL SURGERY

## 2022-10-04 PROCEDURE — 86900 BLOOD TYPING SEROLOGIC ABO: CPT | Performed by: NEUROLOGICAL SURGERY

## 2022-10-04 PROCEDURE — 25010000002 DEXAMETHASONE PER 1 MG: Performed by: NURSE ANESTHETIST, CERTIFIED REGISTERED

## 2022-10-04 PROCEDURE — 25010000002 HYDROMORPHONE PER 4 MG: Performed by: ANESTHESIOLOGY

## 2022-10-04 PROCEDURE — 25010000002 VANCOMYCIN 10 G RECONSTITUTED SOLUTION: Performed by: NURSE ANESTHETIST, CERTIFIED REGISTERED

## 2022-10-04 PROCEDURE — 89051 BODY FLUID CELL COUNT: CPT | Performed by: NEUROLOGICAL SURGERY

## 2022-10-04 DEVICE — SCRW PLT NEURO LEFORTE L/P SLF/DRL 1.4X3.7MM SLVR: Type: IMPLANTABLE DEVICE | Site: CRANIAL | Status: FUNCTIONAL

## 2022-10-04 DEVICE — HEMOST ABS SURGIFOAM SZ100 8X12 10MM: Type: IMPLANTABLE DEVICE | Site: CRANIAL | Status: FUNCTIONAL

## 2022-10-04 DEVICE — IMPLANTABLE DEVICE: Type: IMPLANTABLE DEVICE | Site: CRANIAL | Status: FUNCTIONAL

## 2022-10-04 DEVICE — ABSORBABLE HEMOSTAT (OXIDIZED REGENERATED CELLULOSE, U.S.P.)
Type: IMPLANTABLE DEVICE | Site: CRANIAL | Status: FUNCTIONAL
Brand: SURGICEL

## 2022-10-04 DEVICE — KT HEMOST ABS SURGIFOAM PORCN 1GRAM: Type: IMPLANTABLE DEVICE | Site: CRANIAL | Status: FUNCTIONAL

## 2022-10-04 RX ORDER — ONDANSETRON 2 MG/ML
INJECTION INTRAMUSCULAR; INTRAVENOUS AS NEEDED
Status: DISCONTINUED | OUTPATIENT
Start: 2022-10-04 | End: 2022-10-04 | Stop reason: SURG

## 2022-10-04 RX ORDER — NALOXONE HCL 0.4 MG/ML
0.04 VIAL (ML) INJECTION AS NEEDED
Status: DISCONTINUED | OUTPATIENT
Start: 2022-10-04 | End: 2022-10-04 | Stop reason: HOSPADM

## 2022-10-04 RX ORDER — FLUMAZENIL 0.1 MG/ML
0.2 INJECTION INTRAVENOUS AS NEEDED
Status: DISCONTINUED | OUTPATIENT
Start: 2022-10-04 | End: 2022-10-04 | Stop reason: HOSPADM

## 2022-10-04 RX ORDER — ZINC SULFATE 50(220)MG
220 CAPSULE ORAL DAILY
Status: DISCONTINUED | OUTPATIENT
Start: 2022-10-04 | End: 2022-10-07 | Stop reason: HOSPADM

## 2022-10-04 RX ORDER — PROPOFOL 10 MG/ML
VIAL (ML) INTRAVENOUS AS NEEDED
Status: DISCONTINUED | OUTPATIENT
Start: 2022-10-04 | End: 2022-10-04 | Stop reason: SURG

## 2022-10-04 RX ORDER — METOPROLOL TARTRATE 5 MG/5ML
INJECTION INTRAVENOUS AS NEEDED
Status: DISCONTINUED | OUTPATIENT
Start: 2022-10-04 | End: 2022-10-04 | Stop reason: SURG

## 2022-10-04 RX ORDER — ROCURONIUM BROMIDE 10 MG/ML
INJECTION, SOLUTION INTRAVENOUS AS NEEDED
Status: DISCONTINUED | OUTPATIENT
Start: 2022-10-04 | End: 2022-10-04 | Stop reason: SURG

## 2022-10-04 RX ORDER — MULTIVITAMIN WITH IRON
25000 TABLET ORAL DAILY
Status: DISCONTINUED | OUTPATIENT
Start: 2022-10-04 | End: 2022-10-07 | Stop reason: HOSPADM

## 2022-10-04 RX ORDER — ISOSORBIDE MONONITRATE 60 MG/1
60 TABLET, EXTENDED RELEASE ORAL
Status: DISCONTINUED | OUTPATIENT
Start: 2022-10-04 | End: 2022-10-07 | Stop reason: HOSPADM

## 2022-10-04 RX ORDER — DEXAMETHASONE SODIUM PHOSPHATE 4 MG/ML
INJECTION, SOLUTION INTRA-ARTICULAR; INTRALESIONAL; INTRAMUSCULAR; INTRAVENOUS; SOFT TISSUE AS NEEDED
Status: DISCONTINUED | OUTPATIENT
Start: 2022-10-04 | End: 2022-10-04 | Stop reason: SURG

## 2022-10-04 RX ORDER — OXYCODONE AND ACETAMINOPHEN 10; 325 MG/1; MG/1
1 TABLET ORAL ONCE AS NEEDED
Status: DISCONTINUED | OUTPATIENT
Start: 2022-10-04 | End: 2022-10-04 | Stop reason: HOSPADM

## 2022-10-04 RX ORDER — LIDOCAINE HYDROCHLORIDE 10 MG/ML
0.5 INJECTION, SOLUTION EPIDURAL; INFILTRATION; INTRACAUDAL; PERINEURAL ONCE AS NEEDED
Status: DISCONTINUED | OUTPATIENT
Start: 2022-10-04 | End: 2022-10-04 | Stop reason: HOSPADM

## 2022-10-04 RX ORDER — INSULIN LISPRO 100 [IU]/ML
0-14 INJECTION, SOLUTION INTRAVENOUS; SUBCUTANEOUS
Status: DISCONTINUED | OUTPATIENT
Start: 2022-10-04 | End: 2022-10-07 | Stop reason: HOSPADM

## 2022-10-04 RX ORDER — OXYCODONE AND ACETAMINOPHEN 10; 325 MG/1; MG/1
1 TABLET ORAL EVERY 4 HOURS PRN
Status: DISCONTINUED | OUTPATIENT
Start: 2022-10-04 | End: 2022-10-07 | Stop reason: HOSPADM

## 2022-10-04 RX ORDER — LABETALOL HYDROCHLORIDE 5 MG/ML
5 INJECTION, SOLUTION INTRAVENOUS
Status: DISCONTINUED | OUTPATIENT
Start: 2022-10-04 | End: 2022-10-04 | Stop reason: HOSPADM

## 2022-10-04 RX ORDER — SODIUM CHLORIDE 0.9 % (FLUSH) 0.9 %
10 SYRINGE (ML) INJECTION AS NEEDED
Status: DISCONTINUED | OUTPATIENT
Start: 2022-10-04 | End: 2022-10-04 | Stop reason: HOSPADM

## 2022-10-04 RX ORDER — OXYCODONE AND ACETAMINOPHEN 7.5; 325 MG/1; MG/1
1 TABLET ORAL EVERY 4 HOURS PRN
Status: DISCONTINUED | OUTPATIENT
Start: 2022-10-04 | End: 2022-10-07 | Stop reason: HOSPADM

## 2022-10-04 RX ORDER — DROPERIDOL 2.5 MG/ML
0.62 INJECTION, SOLUTION INTRAMUSCULAR; INTRAVENOUS ONCE AS NEEDED
Status: DISCONTINUED | OUTPATIENT
Start: 2022-10-04 | End: 2022-10-04 | Stop reason: HOSPADM

## 2022-10-04 RX ORDER — DEXTROSE MONOHYDRATE 25 G/50ML
25 INJECTION, SOLUTION INTRAVENOUS
Status: DISCONTINUED | OUTPATIENT
Start: 2022-10-04 | End: 2022-10-07 | Stop reason: HOSPADM

## 2022-10-04 RX ORDER — NICARDIPINE HYDROCHLORIDE 2.5 MG/ML
INJECTION INTRAVENOUS AS NEEDED
Status: DISCONTINUED | OUTPATIENT
Start: 2022-10-04 | End: 2022-10-04 | Stop reason: SURG

## 2022-10-04 RX ORDER — PANTOPRAZOLE SODIUM 40 MG/1
40 TABLET, DELAYED RELEASE ORAL EVERY MORNING
Status: DISCONTINUED | OUTPATIENT
Start: 2022-10-05 | End: 2022-10-07 | Stop reason: HOSPADM

## 2022-10-04 RX ORDER — SODIUM CHLORIDE, SODIUM LACTATE, POTASSIUM CHLORIDE, CALCIUM CHLORIDE 600; 310; 30; 20 MG/100ML; MG/100ML; MG/100ML; MG/100ML
1000 INJECTION, SOLUTION INTRAVENOUS CONTINUOUS
Status: DISCONTINUED | OUTPATIENT
Start: 2022-10-04 | End: 2022-10-04

## 2022-10-04 RX ORDER — MAGNESIUM HYDROXIDE 1200 MG/15ML
LIQUID ORAL AS NEEDED
Status: DISCONTINUED | OUTPATIENT
Start: 2022-10-04 | End: 2022-10-04 | Stop reason: HOSPADM

## 2022-10-04 RX ORDER — ATORVASTATIN CALCIUM 10 MG/1
20 TABLET, FILM COATED ORAL NIGHTLY
Status: DISCONTINUED | OUTPATIENT
Start: 2022-10-04 | End: 2022-10-07 | Stop reason: HOSPADM

## 2022-10-04 RX ORDER — AMLODIPINE BESYLATE 10 MG/1
10 TABLET ORAL
Status: DISCONTINUED | OUTPATIENT
Start: 2022-10-04 | End: 2022-10-07 | Stop reason: HOSPADM

## 2022-10-04 RX ORDER — LIDOCAINE HYDROCHLORIDE 20 MG/ML
INJECTION, SOLUTION EPIDURAL; INFILTRATION; INTRACAUDAL; PERINEURAL AS NEEDED
Status: DISCONTINUED | OUTPATIENT
Start: 2022-10-04 | End: 2022-10-04 | Stop reason: SURG

## 2022-10-04 RX ORDER — SODIUM CHLORIDE, SODIUM LACTATE, POTASSIUM CHLORIDE, CALCIUM CHLORIDE 600; 310; 30; 20 MG/100ML; MG/100ML; MG/100ML; MG/100ML
9 INJECTION, SOLUTION INTRAVENOUS CONTINUOUS
Status: DISCONTINUED | OUTPATIENT
Start: 2022-10-04 | End: 2022-10-04

## 2022-10-04 RX ORDER — LABETALOL HYDROCHLORIDE 5 MG/ML
INJECTION, SOLUTION INTRAVENOUS AS NEEDED
Status: DISCONTINUED | OUTPATIENT
Start: 2022-10-04 | End: 2022-10-04 | Stop reason: SURG

## 2022-10-04 RX ORDER — BISACODYL 5 MG/1
5 TABLET, DELAYED RELEASE ORAL DAILY PRN
Status: DISCONTINUED | OUTPATIENT
Start: 2022-10-04 | End: 2022-10-07 | Stop reason: HOSPADM

## 2022-10-04 RX ORDER — AMOXICILLIN 250 MG
2 CAPSULE ORAL 2 TIMES DAILY
Status: DISCONTINUED | OUTPATIENT
Start: 2022-10-04 | End: 2022-10-07 | Stop reason: HOSPADM

## 2022-10-04 RX ORDER — SODIUM CHLORIDE 0.9 % (FLUSH) 0.9 %
10 SYRINGE (ML) INJECTION EVERY 12 HOURS SCHEDULED
Status: DISCONTINUED | OUTPATIENT
Start: 2022-10-04 | End: 2022-10-04 | Stop reason: HOSPADM

## 2022-10-04 RX ORDER — SOTALOL HYDROCHLORIDE 80 MG/1
20 TABLET ORAL DAILY
Status: DISCONTINUED | OUTPATIENT
Start: 2022-10-04 | End: 2022-10-05

## 2022-10-04 RX ORDER — METOPROLOL TARTRATE 50 MG/1
50 TABLET, FILM COATED ORAL EVERY 12 HOURS SCHEDULED
Status: DISCONTINUED | OUTPATIENT
Start: 2022-10-04 | End: 2022-10-06

## 2022-10-04 RX ORDER — ONDANSETRON 4 MG/1
4 TABLET, FILM COATED ORAL EVERY 6 HOURS PRN
Status: DISCONTINUED | OUTPATIENT
Start: 2022-10-04 | End: 2022-10-07 | Stop reason: HOSPADM

## 2022-10-04 RX ORDER — LEVETIRACETAM 500 MG/1
500 TABLET ORAL 2 TIMES DAILY
Status: DISCONTINUED | OUTPATIENT
Start: 2022-10-04 | End: 2022-10-07 | Stop reason: HOSPADM

## 2022-10-04 RX ORDER — NICOTINE POLACRILEX 4 MG
15 LOZENGE BUCCAL
Status: DISCONTINUED | OUTPATIENT
Start: 2022-10-04 | End: 2022-10-07 | Stop reason: HOSPADM

## 2022-10-04 RX ORDER — NICOTINE 21 MG/24HR
1 PATCH, TRANSDERMAL 24 HOURS TRANSDERMAL
Status: DISCONTINUED | OUTPATIENT
Start: 2022-10-04 | End: 2022-10-07 | Stop reason: HOSPADM

## 2022-10-04 RX ORDER — HYDROMORPHONE HYDROCHLORIDE 1 MG/ML
0.5 INJECTION, SOLUTION INTRAMUSCULAR; INTRAVENOUS; SUBCUTANEOUS
Status: DISCONTINUED | OUTPATIENT
Start: 2022-10-04 | End: 2022-10-04 | Stop reason: HOSPADM

## 2022-10-04 RX ORDER — FENTANYL CITRATE 50 UG/ML
INJECTION, SOLUTION INTRAMUSCULAR; INTRAVENOUS AS NEEDED
Status: DISCONTINUED | OUTPATIENT
Start: 2022-10-04 | End: 2022-10-04 | Stop reason: SURG

## 2022-10-04 RX ORDER — SODIUM CHLORIDE AND POTASSIUM CHLORIDE 150; 900 MG/100ML; MG/100ML
75 INJECTION, SOLUTION INTRAVENOUS CONTINUOUS
Status: DISCONTINUED | OUTPATIENT
Start: 2022-10-04 | End: 2022-10-05

## 2022-10-04 RX ORDER — VANCOMYCIN HYDROCHLORIDE 1 G/20ML
INJECTION, POWDER, LYOPHILIZED, FOR SOLUTION INTRAVENOUS AS NEEDED
Status: DISCONTINUED | OUTPATIENT
Start: 2022-10-04 | End: 2022-10-04 | Stop reason: HOSPADM

## 2022-10-04 RX ORDER — ASPIRIN 81 MG/1
81 TABLET, CHEWABLE ORAL DAILY
Status: DISCONTINUED | OUTPATIENT
Start: 2022-10-05 | End: 2022-10-07 | Stop reason: HOSPADM

## 2022-10-04 RX ORDER — GABAPENTIN 300 MG/1
300 CAPSULE ORAL 3 TIMES DAILY
Status: DISCONTINUED | OUTPATIENT
Start: 2022-10-04 | End: 2022-10-07 | Stop reason: HOSPADM

## 2022-10-04 RX ORDER — ASCORBIC ACID 500 MG
500 TABLET ORAL DAILY
Status: DISCONTINUED | OUTPATIENT
Start: 2022-10-04 | End: 2022-10-07 | Stop reason: HOSPADM

## 2022-10-04 RX ORDER — BUTALBITAL, ACETAMINOPHEN AND CAFFEINE 50; 325; 40 MG/1; MG/1; MG/1
1 TABLET ORAL EVERY 6 HOURS PRN
Status: DISCONTINUED | OUTPATIENT
Start: 2022-10-04 | End: 2022-10-07 | Stop reason: HOSPADM

## 2022-10-04 RX ORDER — DEXTROSE MONOHYDRATE 25 G/50ML
12.5 INJECTION, SOLUTION INTRAVENOUS AS NEEDED
Status: DISCONTINUED | OUTPATIENT
Start: 2022-10-04 | End: 2022-10-04 | Stop reason: HOSPADM

## 2022-10-04 RX ORDER — POLYETHYLENE GLYCOL 3350 17 G/17G
17 POWDER, FOR SOLUTION ORAL DAILY PRN
Status: DISCONTINUED | OUTPATIENT
Start: 2022-10-04 | End: 2022-10-07 | Stop reason: HOSPADM

## 2022-10-04 RX ORDER — HYDRALAZINE HYDROCHLORIDE 20 MG/ML
10 INJECTION INTRAMUSCULAR; INTRAVENOUS EVERY 6 HOURS PRN
Status: DISCONTINUED | OUTPATIENT
Start: 2022-10-04 | End: 2022-10-07 | Stop reason: HOSPADM

## 2022-10-04 RX ORDER — FENTANYL CITRATE 50 UG/ML
25 INJECTION, SOLUTION INTRAMUSCULAR; INTRAVENOUS
Status: DISCONTINUED | OUTPATIENT
Start: 2022-10-04 | End: 2022-10-04 | Stop reason: HOSPADM

## 2022-10-04 RX ORDER — IBUPROFEN 600 MG/1
600 TABLET ORAL ONCE AS NEEDED
Status: DISCONTINUED | OUTPATIENT
Start: 2022-10-04 | End: 2022-10-04 | Stop reason: HOSPADM

## 2022-10-04 RX ORDER — LISINOPRIL 20 MG/1
20 TABLET ORAL DAILY
Status: DISCONTINUED | OUTPATIENT
Start: 2022-10-04 | End: 2022-10-07 | Stop reason: HOSPADM

## 2022-10-04 RX ORDER — EPHEDRINE SULFATE 50 MG/ML
INJECTION, SOLUTION INTRAVENOUS AS NEEDED
Status: DISCONTINUED | OUTPATIENT
Start: 2022-10-04 | End: 2022-10-04 | Stop reason: SURG

## 2022-10-04 RX ORDER — BUPIVACAINE HYDROCHLORIDE AND EPINEPHRINE 2.5; 5 MG/ML; UG/ML
INJECTION, SOLUTION EPIDURAL; INFILTRATION; INTRACAUDAL; PERINEURAL AS NEEDED
Status: DISCONTINUED | OUTPATIENT
Start: 2022-10-04 | End: 2022-10-04 | Stop reason: HOSPADM

## 2022-10-04 RX ORDER — ONDANSETRON 2 MG/ML
4 INJECTION INTRAMUSCULAR; INTRAVENOUS
Status: DISCONTINUED | OUTPATIENT
Start: 2022-10-04 | End: 2022-10-04 | Stop reason: HOSPADM

## 2022-10-04 RX ORDER — HEPARIN SODIUM 5000 [USP'U]/ML
5000 INJECTION, SOLUTION INTRAVENOUS; SUBCUTANEOUS EVERY 12 HOURS SCHEDULED
Status: DISCONTINUED | OUTPATIENT
Start: 2022-10-05 | End: 2022-10-07 | Stop reason: HOSPADM

## 2022-10-04 RX ORDER — LABETALOL HYDROCHLORIDE 5 MG/ML
10 INJECTION, SOLUTION INTRAVENOUS EVERY 4 HOURS PRN
Status: DISCONTINUED | OUTPATIENT
Start: 2022-10-04 | End: 2022-10-07 | Stop reason: HOSPADM

## 2022-10-04 RX ORDER — ONDANSETRON 2 MG/ML
4 INJECTION INTRAMUSCULAR; INTRAVENOUS EVERY 6 HOURS PRN
Status: DISCONTINUED | OUTPATIENT
Start: 2022-10-04 | End: 2022-10-07 | Stop reason: HOSPADM

## 2022-10-04 RX ORDER — SODIUM CHLORIDE 0.9 % (FLUSH) 0.9 %
3 SYRINGE (ML) INJECTION AS NEEDED
Status: DISCONTINUED | OUTPATIENT
Start: 2022-10-04 | End: 2022-10-04 | Stop reason: HOSPADM

## 2022-10-04 RX ADMIN — DOCUSATE SODIUM 50 MG AND SENNOSIDES 8.6 MG 2 TABLET: 8.6; 5 TABLET, FILM COATED ORAL at 21:30

## 2022-10-04 RX ADMIN — HYDROMORPHONE HYDROCHLORIDE 0.5 MG: 1 INJECTION, SOLUTION INTRAMUSCULAR; INTRAVENOUS; SUBCUTANEOUS at 14:36

## 2022-10-04 RX ADMIN — LABETALOL HYDROCHLORIDE 10 MG: 5 INJECTION INTRAVENOUS at 13:56

## 2022-10-04 RX ADMIN — OXYCODONE HYDROCHLORIDE AND ACETAMINOPHEN 500 MG: 500 TABLET ORAL at 16:31

## 2022-10-04 RX ADMIN — PROPOFOL 150 MG: 10 INJECTION, EMULSION INTRAVENOUS at 11:22

## 2022-10-04 RX ADMIN — AMLODIPINE BESYLATE 10 MG: 10 TABLET ORAL at 16:31

## 2022-10-04 RX ADMIN — FENTANYL CITRATE 25 MCG: 50 INJECTION INTRAMUSCULAR; INTRAVENOUS at 14:23

## 2022-10-04 RX ADMIN — ROCURONIUM BROMIDE 50 MG: 10 SOLUTION INTRAVENOUS at 11:22

## 2022-10-04 RX ADMIN — HYDROMORPHONE HYDROCHLORIDE 0.5 MG: 1 INJECTION, SOLUTION INTRAMUSCULAR; INTRAVENOUS; SUBCUTANEOUS at 14:16

## 2022-10-04 RX ADMIN — FENTANYL CITRATE 50 MCG: 50 INJECTION INTRAMUSCULAR; INTRAVENOUS at 13:20

## 2022-10-04 RX ADMIN — Medication 1750 MG: at 11:30

## 2022-10-04 RX ADMIN — POTASSIUM CHLORIDE AND SODIUM CHLORIDE 75 ML/HR: 900; 150 INJECTION, SOLUTION INTRAVENOUS at 16:30

## 2022-10-04 RX ADMIN — ONDANSETRON 4 MG: 2 INJECTION INTRAMUSCULAR; INTRAVENOUS at 13:29

## 2022-10-04 RX ADMIN — EPHEDRINE SULFATE 15 MG: 50 INJECTION INTRAVENOUS at 12:02

## 2022-10-04 RX ADMIN — SODIUM CHLORIDE 5 MG/HR: 9 INJECTION, SOLUTION INTRAVENOUS at 21:31

## 2022-10-04 RX ADMIN — VANCOMYCIN HYDROCHLORIDE 1750 MG: 1 INJECTION, POWDER, LYOPHILIZED, FOR SOLUTION INTRAVENOUS at 11:11

## 2022-10-04 RX ADMIN — DEXAMETHASONE SODIUM PHOSPHATE 8 MG: 4 INJECTION, SOLUTION INTRA-ARTICULAR; INTRALESIONAL; INTRAMUSCULAR; INTRAVENOUS; SOFT TISSUE at 11:32

## 2022-10-04 RX ADMIN — GABAPENTIN 300 MG: 300 CAPSULE ORAL at 20:21

## 2022-10-04 RX ADMIN — SUGAMMADEX 200 MG: 100 INJECTION, SOLUTION INTRAVENOUS at 13:57

## 2022-10-04 RX ADMIN — VANCOMYCIN HYDROCHLORIDE 1250 MG: 10 INJECTION, POWDER, LYOPHILIZED, FOR SOLUTION INTRAVENOUS at 22:14

## 2022-10-04 RX ADMIN — OXYCODONE AND ACETAMINOPHEN 1 TABLET: 325; 10 TABLET ORAL at 16:38

## 2022-10-04 RX ADMIN — ROCURONIUM BROMIDE 50 MG: 10 SOLUTION INTRAVENOUS at 12:20

## 2022-10-04 RX ADMIN — SODIUM CHLORIDE, POTASSIUM CHLORIDE, SODIUM LACTATE AND CALCIUM CHLORIDE: 600; 310; 30; 20 INJECTION, SOLUTION INTRAVENOUS at 13:35

## 2022-10-04 RX ADMIN — FENTANYL CITRATE 25 MCG: 50 INJECTION INTRAMUSCULAR; INTRAVENOUS at 14:30

## 2022-10-04 RX ADMIN — ISOSORBIDE MONONITRATE 60 MG: 60 TABLET, EXTENDED RELEASE ORAL at 16:31

## 2022-10-04 RX ADMIN — METOPROLOL TARTRATE 50 MG: 50 TABLET, FILM COATED ORAL at 20:21

## 2022-10-04 RX ADMIN — METOPROLOL TARTRATE 1 MG: 5 INJECTION INTRAVENOUS at 12:40

## 2022-10-04 RX ADMIN — FENTANYL CITRATE 50 MCG: 50 INJECTION INTRAMUSCULAR; INTRAVENOUS at 11:22

## 2022-10-04 RX ADMIN — SODIUM CHLORIDE, POTASSIUM CHLORIDE, SODIUM LACTATE AND CALCIUM CHLORIDE 1000 ML: 600; 310; 30; 20 INJECTION, SOLUTION INTRAVENOUS at 10:50

## 2022-10-04 RX ADMIN — SODIUM CHLORIDE 5 MG/HR: 9 INJECTION, SOLUTION INTRAVENOUS at 15:25

## 2022-10-04 RX ADMIN — NICARDIPINE HYDROCHLORIDE 500 MCG: 2.5 INJECTION INTRAVENOUS at 13:55

## 2022-10-04 RX ADMIN — ATORVASTATIN CALCIUM 20 MG: 10 TABLET, FILM COATED ORAL at 20:21

## 2022-10-04 RX ADMIN — SODIUM CHLORIDE, POTASSIUM CHLORIDE, SODIUM LACTATE AND CALCIUM CHLORIDE 1000 ML: 600; 310; 30; 20 INJECTION, SOLUTION INTRAVENOUS at 10:51

## 2022-10-04 RX ADMIN — LEVETIRACETAM 500 MG: 500 TABLET, FILM COATED ORAL at 20:21

## 2022-10-04 RX ADMIN — FENTANYL CITRATE 50 MCG: 50 INJECTION INTRAMUSCULAR; INTRAVENOUS at 12:45

## 2022-10-04 RX ADMIN — ROCURONIUM BROMIDE 10 MG: 10 SOLUTION INTRAVENOUS at 12:58

## 2022-10-04 RX ADMIN — Medication 24000 UNITS: at 17:40

## 2022-10-04 RX ADMIN — HYDROMORPHONE HYDROCHLORIDE 0.5 MG: 1 INJECTION, SOLUTION INTRAMUSCULAR; INTRAVENOUS; SUBCUTANEOUS at 14:26

## 2022-10-04 RX ADMIN — GABAPENTIN 300 MG: 300 CAPSULE ORAL at 16:38

## 2022-10-04 RX ADMIN — ZINC SULFATE 220 MG (50 MG) CAPSULE 220 MG: CAPSULE at 16:38

## 2022-10-04 RX ADMIN — Medication 100 MG: at 11:22

## 2022-10-04 RX ADMIN — FENTANYL CITRATE 50 MCG: 50 INJECTION INTRAMUSCULAR; INTRAVENOUS at 11:34

## 2022-10-04 RX ADMIN — NICOTINE 1 PATCH: 14 PATCH, EXTENDED RELEASE TRANSDERMAL at 17:40

## 2022-10-04 RX ADMIN — METOPROLOL TARTRATE 2 MG: 5 INJECTION INTRAVENOUS at 12:22

## 2022-10-04 RX ADMIN — INSULIN LISPRO 8 UNITS: 100 INJECTION, SOLUTION INTRAVENOUS; SUBCUTANEOUS at 18:10

## 2022-10-04 RX ADMIN — ROCURONIUM BROMIDE 10 MG: 10 SOLUTION INTRAVENOUS at 13:10

## 2022-10-04 RX ADMIN — METOPROLOL TARTRATE 2 MG: 5 INJECTION INTRAVENOUS at 13:46

## 2022-10-04 RX ADMIN — LISINOPRIL 20 MG: 20 TABLET ORAL at 16:31

## 2022-10-04 NOTE — H&P
Chief Complaint   Patient presents with   • meningioma       Patient is here for a 2mo post op visit with an MRI. He had a craniotomy on 7/1/22.         HPI:   Interval History: Elijah Escobar has a previous history of meningioma status postcraniotomy.  Unfortunately developed postop craniotomy surgical site infection.  As result he was taken by my partner Dr. Banerjee for craniectomy.  Since this time he has done well.  He is still maintaining his oral antibiotics.  He did miss his last follow-up with infectious disease.  No fevers no discharge.  Cognition is improving.  He is doing well and able to ambulate.  He does feel little unsteady on his feet.  No headaches nausea or vomiting.  His incision is well-healed.  Recent CT showed decrease swelling in the right frontal lobe.  CT has been used to create a synthetic cranioplasty.  Pain today is a 0 out of 10.  He has been compliant with his helmet.           Current Medications          Current Outpatient Medications   Medication Sig Dispense Refill   • acetaminophen (TYLENOL) 325 MG tablet Take 2 tablets by mouth Every 4 (Four) Hours As Needed for Mild Pain .       • amLODIPine (NORVASC) 10 MG tablet Take 1 tablet by mouth Daily.       • aspirin 81 MG chewable tablet Chew 1 tablet Daily.       • atorvastatin (LIPITOR) 20 MG tablet Take 20 mg by mouth Every Night.       • bisacodyl (DULCOLAX) 10 MG suppository Insert 1 suppository into the rectum Daily As Needed for Constipation (Use if bisacodyl oral is ineffective).       • bisacodyl (DULCOLAX) 5 MG EC tablet Take 1 tablet by mouth Daily As Needed for Constipation (Use if polyethylene glycol is ineffective).       • butalbital-acetaminophen-caffeine (FIORICET, ESGIC) -40 MG per tablet Take 1 tablet by mouth Every 6 (Six) Hours As Needed for Headache.       • dextrose (D50W) 50 % solution Infuse 50 mL into a venous catheter Every 15 (Fifteen) Minutes As Needed for Low Blood Sugar (Blood Sugar Less Than 70).        • dextrose (GLUTOSE) 40 % gel Take 15 g by mouth Every 15 (Fifteen) Minutes As Needed for Low Blood Sugar (Blood sugar less than 70).       • dilTIAZem HCl-Sodium Chloride (CARDIZEM) 125-0.7 MG/125ML-% solution Infuse 5-15 mg/hr into a venous catheter Dose Adjusted By Provider As Needed.       • famotidine (PEPCID) 40 MG tablet Take 1 tablet by mouth Daily.       • gabapentin (NEURONTIN) 300 MG capsule Take 300 mg by mouth 3 (Three) Times a Day. As needed       • glucagon, human recombinant, (GLUCAGEN DIAGNOSTIC) 1 MG injection Inject 1 mg into the appropriate muscle as directed by prescriber Every 15 (Fifteen) Minutes As Needed (Blood Glucose Less Than 70) for up to 360 days.       • HYDROcodone-acetaminophen (NORCO) 7.5-325 MG per tablet Take 1 tablet by mouth Every 6 (Six) Hours As Needed for Moderate Pain .       • insulin detemir (LEVEMIR) 100 UNIT/ML injection Inject 30 Units under the skin into the appropriate area as directed Every Night.   12   • Insulin Lispro (humaLOG) 100 UNIT/ML injection Inject 0-14 Units under the skin into the appropriate area as directed 4 (Four) Times a Day Before Meals & at Bedtime.   12   • isosorbide mononitrate (IMDUR) 60 MG 24 hr tablet Take 1 tablet by mouth Daily.       • labetalol (NORMODYNE,TRANDATE) 5 MG/ML injection Infuse 2 mL into a venous catheter Every 6 (Six) Hours As Needed for High Blood Pressure.       • metoprolol tartrate (LOPRESSOR) 50 MG tablet Take 1 tablet by mouth Every 12 (Twelve) Hours.       • niCARdipine (CARDENE) 25 mg in 250 mL NS infusion Infuse 5-15 mg/hr into a venous catheter Dose Adjusted By Provider As Needed.       • nicotine (NICODERM CQ) 14 MG/24HR patch Place 1 patch on the skin as directed by provider Daily As Needed (Smoker withdrawal).       • ondansetron (ZOFRAN) 4 MG tablet Take 1 tablet by mouth Every 6 (Six) Hours As Needed for Nausea or Vomiting.       • polyethylene glycol (MIRALAX) 17 g packet Take 17 g by mouth Daily As Needed  "(Use if senna-docusate is ineffective).       • sennosides-docusate (PERICOLACE) 8.6-50 MG per tablet Take 2 tablets by mouth 2 (Two) Times a Day.          No current facility-administered medications for this visit.            Vital Signs  Ht 177.8 cm (70\")   Wt 123 kg (271 lb)   BMI 38.88 kg/m²   Physical Exam  Eyes:      Extraocular Movements: EOM normal.      Pupils: Pupils are equal, round, and reactive to light.   Neurological:      Mental Status: He is oriented to person, place, and time.      Coordination: Finger-Nose-Finger Test abnormal (Left). Heel to Tavarez Test normal.      Gait: Gait is intact. Tandem walk normal.      Deep Tendon Reflexes:      Reflex Scores:       Tricep reflexes are 1+ on the right side and 1+ on the left side.       Bicep reflexes are 1+ on the right side and 1+ on the left side.       Brachioradialis reflexes are 1+ on the right side and 1+ on the left side.       Patellar reflexes are 1+ on the right side and 1+ on the left side.       Achilles reflexes are 1+ on the right side and 1+ on the left side.  Psychiatric:         Speech: Speech normal.         Neurologic Exam      Mental Status   Oriented to person, place, and time.   Registration: recalls 3 of 3 objects. Recall at 5 minutes: recalls 3 of 3 objects.   Attention: normal. Concentration: normal.   Speech: speech is normal   Level of consciousness: alert  Knowledge: consistent with education.      Cranial Nerves      CN II   Visual acuity: normal     CN III, IV, VI   Pupils are equal, round, and reactive to light.  Extraocular motions are normal.   Diplopia: none     CN V   Facial sensation intact.   Right corneal reflex: normal  Left corneal reflex: normal     CN VII   Right facial weakness: none  Left facial weakness: none     CN VIII   Hearing: intact     CN IX, X   Palate: symmetric  Right gag reflex: normal  Left gag reflex: normal     CN XI   Right trapezius strength: normal  Left trapezius strength: normal     CN " XII   Tongue deviation: none     Motor Exam   Right arm tone: normal  Left arm tone: normal  Right arm pronator drift: absent  Left arm pronator drift: present  Right leg tone: normal  Left leg tone: normal     Strength   Right deltoid: 5/5  Left deltoid: 5/5  Right biceps: 5/5  Left biceps: 5/5  Right triceps: 5/5  Left triceps: 5/5  Right interossei: 5/5  Left interossei: 5/5  Right iliopsoas: 5/5  Left iliopsoas: 5/5  Right quadriceps: 5/5  Left quadriceps: 5/5  Right anterior tibial: 5/5  Left anterior tibial: 5/5  Right gastroc: 5/5  Left gastroc: 5/5Right EHL 5/5   Left EHL 5/5      Sensory Exam   Light touch normal.   Proprioception normal.      Gait, Coordination, and Reflexes      Gait  Gait: normal     Coordination   Finger to nose coordination: abnormal (Left)  Heel to shin coordination: normal  Tandem walking coordination: normal     Reflexes   Right brachioradialis: 1+  Left brachioradialis: 1+  Right biceps: 1+  Left biceps: 1+  Right triceps: 1+  Left triceps: 1+  Right patellar: 1+  Left patellar: 1+  Right achilles: 1+  Left achilles: 1+  Right plantar: normal  Left plantar: normal  Right Jj: absent  Left Jj: absent  Right ankle clonus: absent  Left ankle clonus: absentArrives in wheelchair but able to ambulate easily with only minor imbalance         Incision: CDI        Results Review:   MRI Brain With & Without Contrast     Result Date: 8/31/2022  1. Evidence of interval right frontal craniectomy, with mild bulging of the right frontal lobe at the craniectomy site, there is increased FLAIR signal compatible with gliosis in the right frontal region, without a discrete mass or abnormal intraparenchymal enhancement. There is a fluid collection at the craniectomy site deep to the scalp, which measures approximate 6.2 x 1.2 cm, the fluid appears uncomplicated and is extra-axial. This is contiguous with a smaller 22 mm scalp fluid collection more inferiorly and laterally. The 22 mm fluid  collection probably represents a pseudomeningocele. 2. Mild diffuse pachymeningeal thickening over the right convexity with associated continuous nonnodular enhancement. Reactive enhancement is favored and there is no evidence of diffuse meningitis. This report was finalized on 08/31/2022 16:15 by Dr. Elijah Grover MD.     Lab Results   Component Value Date    CRP <0.30 09/26/2022    CRP 0.41 07/21/2022    CRP 0.86 (H) 07/19/2022    CRP 1.73 (H) 07/13/2022    CRP 5.93 (H) 07/05/2022    CRP 5.38 (H) 06/30/2022    CRP <0.30 05/26/2022    SEDRATE 16 09/26/2022    SEDRATE 56 (H) 07/19/2022    SEDRATE 67 (H) 07/14/2022    SEDRATE 47 (H) 07/13/2022    SEDRATE 80 (H) 07/05/2022    WBC 8.48 09/26/2022    WBC 8.58 07/25/2022    WBC 7.77 07/21/2022    WBC 8.27 07/14/2022    WBC 8.16 07/13/2022    WBC 6.96 07/10/2022    WBC 8.48 07/06/2022    WBC 11.26 (H) 07/05/2022    WBC 10.67 07/03/2022    WBC 13.63 (H) 07/02/2022    WBC 10.28 07/01/2022    WBC 14.28 (H) 06/30/2022                 Assessment/Plan:   Elijah Escobar is a 66 y.o. male with a significant comorbidity of coronary artery disease, diabetes, erectile dysfunction, GERD, hypertension, hyperlipidemia. He presents with a new problem of 2 episodes of intermittent amnesia suggestive of seizures. Physical exam findings of left pronator drift and dysmetria with left finger-nose otherwise neurologically intact.  His imaging shows avidly contrast-enhancing meningeal based mass with dural tails measuring 2.8 x 2.8 x 2.0 cm with surrounding FLAIR signal.     Meningioma  S/P Craniotomy  Postop Wound Infection  S/P Craniectomy  Elijah appears to be doing well since I saw him last.  He remains neurologically intact.  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  Postoperative imaging is stable without signs of infection.  He continues on antibiotics.  We discussed risk benefits possible complications of conservative management versus synthetic cranioplasty.  He would like to move  forward with cranioplasty.     - To OR for Cranioplasty  - ID clearance   - PCP Clearance     Seizures, simple partial secondary generalization  Recommended abstinence from driving.  No new seizures since starting Keppra.  EEG ordered.     Obesity Counseling  Elijah's Body mass index is 40.75 kg/m²..  We spent 1 minutes in weight management counseling including discussing current weight, current diet, and exercise patterns.  Additional we set goals for weight reduction.  Therefore above normal parameters. Recommendations include: educational material and exercise counseling.      Smoking:   Patient is a current smoker. I provided 1 minutes of smoking cessation. I advised the patient of the risks in continuing to use tobacco. .  I advised patient to quit, and offered support..

## 2022-10-04 NOTE — ANESTHESIA PROCEDURE NOTES
Airway  Date/Time: 10/4/2022 11:26 AM  End Time:10/4/2022 11:26 AM  Airway not difficult    General Information and Staff    Patient location during procedure: OR  CRNA/CAA: Geeta Shelton CRNA    Indications and Patient Condition  Indications for airway management: airway protection    Preoxygenated: yes  MILS not maintained throughout  Mask difficulty assessment: 2 - vent by mask + OA or adjuvant +/- NMBA    Final Airway Details  Final airway type: endotracheal airway      Successful airway: ETT  Cuffed: yes   Successful intubation technique: direct laryngoscopy  Facilitating devices/methods: intubating stylet  Endotracheal tube insertion site: oral  Blade: Ramona  Blade size: 3.5  ETT size (mm): 7.5  Cormack-Lehane Classification: grade I - full view of glottis  Placement verified by: chest auscultation and capnometry   Cuff volume (mL): 7  Measured from: lips  ETT/EBT  to lips (cm): 23  Number of attempts at approach: 1  Assessment: lips, teeth, and gum same as pre-op and atraumatic intubation    Additional Comments  Atraumatic intubation by KENDRA Heck

## 2022-10-04 NOTE — ANESTHESIA PREPROCEDURE EVALUATION
Anesthesia Evaluation     Patient summary reviewed and Nursing notes reviewed   no history of anesthetic complications:  NPO Solid Status: > 8 hours  NPO Liquid Status: > 8 hours           Airway   Mallampati: I  TM distance: >3 FB  Neck ROM: full  No difficulty expected  Dental      Pulmonary    (+) a smoker Current,   (-) COPD, sleep apnea  Cardiovascular   Exercise tolerance: poor (<4 METS)    ECG reviewed    (+) hypertension, CAD (coronary calcification on CT), dysrhythmias Atrial Fib, PVD, hyperlipidemia,     ROS comment: Echo 6/20/22  · Left ventricular ejection fraction appears to be 56 - 60%. Left ventricular systolic function is normal.  · Left ventricular wall thickness is consistent with mild concentric hypertrophy.  · Normal right ventricular cavity size and systolic function noted.  · There is no significant (greater than mild) valvular dysfunction.         Neuro/Psych  (+) seizures,    (-) TIA, CVA  GI/Hepatic/Renal/Endo    (+) morbid obesity, GERD,  diabetes mellitus type 2 using insulin,   (-) liver disease, no renal disease    Musculoskeletal     Abdominal    Substance History      OB/GYN          Other                          Anesthesia Plan    ASA 3     general     intravenous induction     Anesthetic plan, risks, benefits, and alternatives have been provided, discussed and informed consent has been obtained with: patient.        CODE STATUS:

## 2022-10-04 NOTE — ANESTHESIA POSTPROCEDURE EVALUATION
"Patient: Elijah Escobar    Procedure Summary     Date: 10/04/22 Room / Location:  [facility] OR  /  [facility]    Anesthesia Start: 1118 Anesthesia Stop: 1405    Procedure: CRANIOPLASTY right with Lumbar drain (Right Head) Diagnosis:       S/P craniotomy      (S/P craniotomy [Z98.890])    Surgeons: Flaco Portillo MD Provider: Geeta Shelton CRNA    Anesthesia Type: general ASA Status: 3          Anesthesia Type: general    Vitals  Vitals Value Taken Time   /79 10/04/22 1442   Temp 98.2 °F (36.8 °C) 10/04/22 1440   Pulse 56 10/04/22 1444   Resp 16 10/04/22 1440   SpO2 95 % 10/04/22 1444   Vitals shown include unvalidated device data.        Post Anesthesia Care and Evaluation    PONV Status: none  Comments: Patient d/c from PACU prior to anes eval based on Manoj score.  Please see RN notes for details of d/c criteria.    Blood pressure 170/79, pulse 59, temperature 98.2 °F (36.8 °C), temperature source Oral, resp. rate 16, height 177 cm (69.69\"), weight 115 kg (254 lb 10.1 oz), SpO2 93 %.          "

## 2022-10-04 NOTE — PROGRESS NOTES
NEUROSURGERY DAILY PROGRESS NOTE    ASSESSMENT:   Elijah Escobar is a 66 y.o. with a significant comorbidity of coronary artery disease, diabetes, erectile dysfunction, GERD, hypertension, hyperlipidemia. He presents with a new problem of 2 episodes of intermittent amnesia suggestive of seizures. Physical exam findings of left pronator drift and dysmetria with left finger-nose otherwise neurologically intact.  His imaging shows avidly contrast-enhancing meningeal based mass with dural tails measuring 2.8 x 2.8 x 2.0 cm with surrounding FLAIR signal.    Past Medical History:   Diagnosis Date   • Brain tumor (HCC)    • Coronary artery disease    • COVID-19 vaccine series completed     MODERNA X 3; LAST DOSE 3/2022   • Diabetes (HCC)    • Erectile dysfunction    • GERD (gastroesophageal reflux disease)    • Hypercholesteremia    • Hypertension      Active Hospital Problems    Diagnosis    • **S/P craniotomy      Added automatically from request for surgery [id number]     • History of cranioplasty      PLAN:   Neuro: Stable.  Intact.   Day of Surgery (10/4/2022) cranioplasty   Postop CT pending   Empty drain as needed   ICU postop for close neurologic monitoring.  Neurochecks per policy.  Call for decline.   Continue Keppra  CV: No acute issues.  Continuous cardiac monitoring.  Cardene to keep SBP <140.  Pulm: Maintaining O2 sat.  Continuous pulse oximetry.  Incentive spirometry.   : Remove Jeong ASAP, bladder scans and I/O cath per policy.   FEN: Regular diet.  Advance as tolerated.   ENDO: Accu-Chek and treat per policy  GI: No acute issues.    ID: 23-hour postoperative prophylactic antibiotics.  Wound healing protocol  Heme:  DVT prophylaxis; SCD's  Pain: Tolerable at present with oral meds.     Dispo: PT/OT.       CHIEF COMPLAINT:   Postoperative infection    Subjective  Symptom stable.  Doing well    Temp:  [96.9 °F (36.1 °C)-98.2 °F (36.8 °C)] 98.2 °F (36.8 °C)  Heart Rate:  [56-76] 71  Resp:  [12-16] 16  BP:  "(103151)/(59-89) 129/65    Objective:  General Appearance:  Well-appearing and in no acute distress.    Vital signs: (most recent): Blood pressure 129/65, pulse 71, temperature 98.2 °F (36.8 °C), temperature source Oral, resp. rate 16, height 177.8 cm (70\"), weight 116 kg (255 lb 11.7 oz), SpO2 94 %.        Neurologic Exam     Mental Status   Oriented to person, place, and time.   Attention: normal. Concentration: normal.   Speech: speech is normal   Level of consciousness: alert    Bright and awake.  Oriented x3.  Follows commands without prompting showing thumbs up and 2 fingers bilaterally.     Cranial Nerves     CN II   Visual fields full to confrontation.     CN III, IV, VI   Pupils are equal, round, and reactive to light.  Extraocular motions are normal.     CN V   Facial sensation intact.     CN VII   Facial expression full, symmetric.     CN VIII   CN VIII normal.     CN IX, X   CN IX normal.     CN XI   CN XI normal.     Motor Exam   Right arm tone: normal  Left arm tone: normal  Right arm pronator drift: absent  Left arm pronator drift: absent  Right leg tone: normal  Left leg tone: normal    Strength   Right deltoid: 5/5  Left deltoid: 5/5  Right biceps: 5/5  Left biceps: 5/5  Right triceps: 5/5  Left triceps: 5/5  Right wrist extension: 5/5  Left wrist extension: 5/5  Right iliopsoas: 5/5  Left iliopsoas: 5/5  Right quadriceps: 5/5  Left quadriceps: 5/5  Right anterior tibial: 5/5  Left anterior tibial: 5/5  Right gastroc: 5/5  Left gastroc: 5/5  Right EHL 5/5  Left EHL 5/5       Sensory Exam   Right arm light touch: normal  Left arm light touch: normal  Right leg light touch: normal  Left leg light touch: normal    Gait, Coordination, and Reflexes     Tremor   Resting tremor: absent  Intention tremor: absent  Action tremor: absent    Drains:   Closed/Suction Drain 1 Right Scalp Bulb 7 Fr. (Active)       Output by Drain (mL) 10/03/22 0701 - 10/03/22 1900 10/03/22 1901 - 10/04/22 0700 10/04/22 0701 - " 10/04/22 1748 Range Total   Closed/Suction Drain 1 Right Scalp Bulb 7 Fr.   30 30       Imaging Results (Last 24 Hours)     Procedure Component Value Units Date/Time    CT Head Without Contrast [[id number]] Collected: 10/04/22 1516     Updated: 10/04/22 1530    Narrative:      EXAMINATION: CT HEAD WO CONTRAST-      10/4/2022 3:00 PM CDT     HISTORY: Eval status post cranioplasty; Z98.890-Other specified  postprocedural states     In order to have a CT radiation dose as low as reasonably achievable  Automated Exposure Control was utilized for adjustment of the mA and/or  KV according to patient size.     DLP in mGycm= 845     The CT scan of the head is performed without intravenous contrast  enhancement.     Images are acquired in axial plane with subsequent reconstruction in  coronal and sagittal plane.     Comparison is made with the previous study dated 8/2/2022. Correlation  made with MR imaging of the brain dated 8/31/2022.     There is a low-density area involving the right frontal lobe which  appears to represent a combination of vasogenic edema and  encephalomalacia.     There is an extra-axial fluid accumulation in the right frontal lobe  temporal region which measures 3.2 x 0.9 cm. This have increased in the  left since the previous study.     There is a mild shift to the left, 1.7 mm, which was not seen in the  previous study.     There is a right frontal cranioplasty which is a change since the  previous study. There is small amount of fluid and air along the right  frontoparietal and temporal scalp. There is a subgaleal drain in place  in this area.     There is effacement of the right frontoparietal cortical sulci.     There is moderate asymmetrical ventricular dilatation, left more than  the right. This is similar to the previous study.     There is a left frontal subgaleal fluid and air which was not seen in  the previous study. This may be postsurgical changes.       Impression:      1. The  intervertebral right frontal cranioplasty.  2. Right frontal vasogenic edema and encephalomalacia.  3. A 1.7 cm shift to the left which was not seen in the previous study.  4. A mild increase in right frontotemporal extra-axial fluid  accumulation adjacent and posterior to the cranioplasty.  5. Right frontoparietal and temporal subgaleal fluid and air and  subgaleal drain in place.  6. A new left frontal subgaleal fluid and air which may be postsurgical.                             This report was finalized on 10/04/2022 15:27 by Dr. Michelle Vigil MD.        Lab Results (last 24 hours)     Procedure Component Value Units Date/Time    POC Glucose Once [[id number]]  (Abnormal) Collected: 10/04/22 1426    Specimen: Blood Updated: 10/04/22 1428     Glucose 190 mg/dL      Comment: : [id number] Kp MilenavarshauaMeter ID: [id number]       Culture, CSF - Cerebrospinal Fluid, Brain [[id number]] Collected: 10/04/22 1201    Specimen: Cerebrospinal Fluid from Brain Updated: 10/04/22 1414     Gram Stain Few (2+) WBCs seen      No organisms seen    Cell Count With Differential, CSF [[id number]]  (Abnormal) Collected: 10/04/22 1201    Specimen: Cerebrospinal Fluid from Brain Updated: 10/04/22 1330    Narrative:      The following orders were created for panel order Cell Count With Differential, CSF.  Procedure                               Abnormality         Status                     ---------                               -----------         ------                     Cell Count, CSF - Cerebr...[[id number]]  Abnormal            Final result               Spinal fluid differentia...[[id number]]                      Final result                 Please view results for these tests on the individual orders.    Spinal fluid differential - Cerebrospinal Fluid, Lumbar Puncture [[id number]] Collected: 10/04/22 1201    Specimen: Cerebrospinal Fluid from Brain Updated: 10/04/22 1330     Lymphocytes, CSF 72 %      Monocytes, CSF 28 %     Cell  Count, CSF - Cerebrospinal Fluid, Lumbar Puncture [[id number]]  (Abnormal) Collected: 10/04/22 1201    Specimen: Cerebrospinal Fluid from Brain Updated: 10/04/22 1252     Color, CSF Colorless     Supernatant Color, CSF Colorless     Appearance, CSF Clear     RBC, CSF 0 /mm3      Nucleated Cells, CSF 12 /mm3      Volume, CSF 7.5 mL      Tube Number, CSF 1     Method: Hemacytometer Method    Protein, CSF - Cerebrospinal Fluid, Brain [[id number]]  (Abnormal) Collected: 10/04/22 1201    Specimen: Cerebrospinal Fluid from Brain Updated: 10/04/22 1246     Protein, Total (CSF) 114.2 mg/dL     Glucose, CSF - Cerebrospinal Fluid, Brain [[id number]]  (Abnormal) Collected: 10/04/22 1201    Specimen: Cerebrospinal Fluid from Brain Updated: 10/04/22 1246     Glucose, CSF 94 mg/dL     VDRL, CSF - Cerebrospinal Fluid, Brain [[id number]] Collected: 10/04/22 1201    Specimen: Cerebrospinal Fluid from Brain Updated: 10/04/22 1213    POC Glucose Once [[id number]]  (Abnormal) Collected: 10/04/22 1038    Specimen: Blood Updated: 10/04/22 1050     Glucose 194 mg/dL      Comment: : [id number] Nuñez ToniMeter ID: [id number]           [id number]  ADITYA Cespedes

## 2022-10-04 NOTE — OP NOTE
NEUROSURGERY OPERATIVE NOTE    Elijah Escobar  OR Date: 10/4/2022    Pre-op Diagnosis:   S/P craniotomy [Z98.890]    Post-op Diagnosis:     Post-Op Diagnosis Codes:     * S/P craniotomy [Z98.890]          Surgeon(s):  Flaco Portillo MD    Anesthesia: General    Staff:   Circulator: Adrian Petty RN; Kierra Irby RN  Scrub Person: Sadie Escobedo; Millie Ureña; Marianna Samano    Procedure(s):  CRANIOPLASTY right with Lumbar drain    INDICATIONS FOR PROCEDURE:   Elijah Escobar is a 66 y.o. male with a significant comorbidity of coronary artery disease, diabetes, erectile dysfunction, GERD, hypertension, hyperlipidemia. He presents with a new problem of 2 episodes of intermittent amnesia suggestive of seizures. Physical exam findings of neurologically intact.  His imaging shows gross total resection of meningioma with large cranial defect and no evidence of infection. .    We discussed the risks of a  bifrontal craniotomy with stereotactic localization, including but not limited to infection, bleeding, damage to local structures, CSF leak, seizures, hydrocephalus, weakness, numbness, paralysis, or loss of bowel and bladder function, stroke, coma, MI, or death.    DESCRIPTION OF OPERATION AND FINDINGS:   On the day of surgery, he was brought to the preoperative holding area where IV access was obtained. Prophylactic intravenous antibiotics were administered. He was then brought to the major operative suite. While on the [facility], the patient underwent an uneventful induction of general anesthetic with placement of endotracheal tube, TEDs, SCD hoses, and a Jeong catheter were applied.      Lumbar Drain -  Timeout was performed.  The patient was placed in the left lateral position and the lumbar region was prepped and draped in the routine manner. The region was thoroughly infiltrated with lidocaine with epinephrine 1-100,000.  IV antibiotics were given.  A Tuohy needle was inserted into  the L4-5 interspace.  The thecal sac was entered on the first attempt.  The bed was placed in reverse Trendelenburg and had brisk flow of CSF. The catheter was inserted through the Tuohy needle without difficulty.  Only 15 cm of the catheter could be easily advanced.  Excellent outflow of CSF was obtained so the needle was removed. The catheter was secured. The system was connected to a drainage system and clamped.  The drain was secured and the patient was repositioned in the supine position.    Cranioplasty -  The patient was placed in the supine position on a standard operating table.  All pressure points were inspected and appropriately padded, and he was secured to the table.  The head was turned slightly to the left. The head was positioned on a horse shoe.    The hair was then clipped along the incision line. The entire scalp was prepped with alcohol, Betadine, and DuraPrep and allowed to dry for 3 minutes.  He was then draped in the usual fashion.  At this point a WHO surgical timeout was performed with all members of the surgical team.      The skin was infiltrated with quarter percent Marcaine with epinephrine.  The prior bicoronal skin incision was opened with a #15 scalpel.  Skin incision and Bovie cautery were then used to go through the galea.  Cintia clips were then placed along the skin.  The flap was then mobilized in a subgaleal plane anteriorly and posteriorly.  This was done until we encountered a fat pad overlying the temporalis muscle on the right.  To prevent destruction of the facial nerve the temporalis fascia and muscle was then incised.      Next we began by working circumferentially around the outside of the previous craniotomy.  This was brought to the bone edge and then scar tissue was disconnected from the underlying dura.  Generally the dura was found to be intact with only 2 small areas with a brief amount of CSF egress.  These were quickly closed with 4-0 Nurolon.  We continued  around till the complete craniotomy was exposed.  Penfield 1 was then used to just roll the scar tissue internally.  Any excessive scar tissue was then debulked.  The cavity was gently irrigated multiple times with irrigation. Next the preplanned manufactured cranioplasty was brought onto the field.  Vancomycin was laid underneath the cranioplasty flap.  The flap was then checked for fit and bur hole covers were used to connected to this skull with small titanium screws.    Again the extracranial space was thoroughly irrigated with 1 L of irrigation.  Next a second 7 Maltese flat JUAN was placed in the subgaleal space and tunneled posteriorly.  The muscle fascia galea was closed with interrupted 2-0 Vicryl sutures.  4-0 Monocryl was used for skin closure.  Incision was dressed with Xeroform and the patient's head was wrapped..    The patient was awoken and found to be at his neurological baseline.    There were no observed complications. The needle, sponge, and cottonoid counts were correct.        Estimated Blood Loss: 200ml    Complications: None    Implants:   Implant Name Type Inv. Item Serial No.  Lot No. LRB No. Used Action   HEMOST ABS SURGICEL 4X8IN - [id number] Implant HEMOST ABS SURGICEL 4X8IN  [employer]  DIV [employer] J [employer] J  Right 1 Implanted   HEMOST ABS SURGIFOAM  8X12 10MM - [id number] Implant HEMOST ABS SURGIFOAM  8X12 10MM  [employer]  DIV OF J AND J [id number] Right 1 Implanted   KT HEMOST ABS SURGIFOAM PORCN 1GRAM - [id number] Implant KT HEMOST ABS SURGIFOAM PORCN 1GRAM  [employer]  DIV OF J AND J [id number] Right 1 Implanted   PLT BURHL LEFORTE PRE/CONTRD TI 5HL 0.3X22MM GRN - [id number] Implant PLT BURHL LEFORTE PRE/CONTRD TI 5HL 0.3X22MM GRN  [employer]  Right 4 Implanted   SCRW PLT NEURO LEFORTE L/P SLF/DRL 1.4X3.7MM SLVR - [id number] Implant SCRW PLT NEURO LEFORTE L/P SLF/DRL 1.4X3.7MM SLVR  [employer]  Right 19 Implanted       Specimens:                Specimens     ID Source Type Tests  Collected By Collected At Frozen?    1 Brain Cerebrospinal Fluid · GLUCOSE, CSF  · PROTEIN, CSF  · VDRL, CSF  · CELL COUNT WITH DIFFERENTIAL, CSF  · CULTURE, CSF  · BODY FLUID CULTURE (Canceled)   Flaco Portillo MD 10/4/22 1207     Description: CSF            Drains:   Closed/Suction Drain 1 Right Scalp Bulb 7 Fr. (Active)

## 2022-10-05 LAB
ANION GAP SERPL CALCULATED.3IONS-SCNC: 15 MMOL/L (ref 5–15)
BASOPHILS # BLD AUTO: 0.03 10*3/MM3 (ref 0–0.2)
BASOPHILS NFR BLD AUTO: 0.2 % (ref 0–1.5)
BUN SERPL-MCNC: 21 MG/DL (ref 8–23)
BUN/CREAT SERPL: 23.9 (ref 7–25)
CALCIUM SPEC-SCNC: 8.2 MG/DL (ref 8.6–10.5)
CHLORIDE SERPL-SCNC: 102 MMOL/L (ref 98–107)
CO2 SERPL-SCNC: 18 MMOL/L (ref 22–29)
CREAT SERPL-MCNC: 0.88 MG/DL (ref 0.76–1.27)
DEPRECATED RDW RBC AUTO: 44.3 FL (ref 37–54)
EGFRCR SERPLBLD CKD-EPI 2021: 94.8 ML/MIN/1.73
EOSINOPHIL # BLD AUTO: 0 10*3/MM3 (ref 0–0.4)
EOSINOPHIL NFR BLD AUTO: 0 % (ref 0.3–6.2)
ERYTHROCYTE [DISTWIDTH] IN BLOOD BY AUTOMATED COUNT: 13 % (ref 12.3–15.4)
GLUCOSE BLDC GLUCOMTR-MCNC: 292 MG/DL (ref 70–130)
GLUCOSE BLDC GLUCOMTR-MCNC: 308 MG/DL (ref 70–130)
GLUCOSE BLDC GLUCOMTR-MCNC: 318 MG/DL (ref 70–130)
GLUCOSE SERPL-MCNC: 329 MG/DL (ref 65–99)
HCT VFR BLD AUTO: 38.9 % (ref 37.5–51)
HGB BLD-MCNC: 12.4 G/DL (ref 13–17.7)
IMM GRANULOCYTES # BLD AUTO: 0.17 10*3/MM3 (ref 0–0.05)
IMM GRANULOCYTES NFR BLD AUTO: 1.2 % (ref 0–0.5)
LYMPHOCYTES # BLD AUTO: 1.99 10*3/MM3 (ref 0.7–3.1)
LYMPHOCYTES NFR BLD AUTO: 13.9 % (ref 19.6–45.3)
MCH RBC QN AUTO: 29.7 PG (ref 26.6–33)
MCHC RBC AUTO-ENTMCNC: 31.9 G/DL (ref 31.5–35.7)
MCV RBC AUTO: 93.3 FL (ref 79–97)
MONOCYTES # BLD AUTO: 0.67 10*3/MM3 (ref 0.1–0.9)
MONOCYTES NFR BLD AUTO: 4.7 % (ref 5–12)
NEUTROPHILS NFR BLD AUTO: 11.42 10*3/MM3 (ref 1.7–7)
NEUTROPHILS NFR BLD AUTO: 80 % (ref 42.7–76)
NRBC BLD AUTO-RTO: 0 /100 WBC (ref 0–0.2)
PLATELET # BLD AUTO: 203 10*3/MM3 (ref 140–450)
PMV BLD AUTO: 11.2 FL (ref 6–12)
POTASSIUM SERPL-SCNC: 4.9 MMOL/L (ref 3.5–5.2)
RBC # BLD AUTO: 4.17 10*6/MM3 (ref 4.14–5.8)
SODIUM SERPL-SCNC: 135 MMOL/L (ref 136–145)
VANCOMYCIN TROUGH SERPL-MCNC: 10.7 MCG/ML (ref 5–20)
WBC NRBC COR # BLD: 14.28 10*3/MM3 (ref 3.4–10.8)

## 2022-10-05 PROCEDURE — 63710000001 INSULIN LISPRO (HUMAN) PER 5 UNITS: Performed by: NURSE PRACTITIONER

## 2022-10-05 PROCEDURE — 25010000002 VANCOMYCIN 10 G RECONSTITUTED SOLUTION: Performed by: NURSE PRACTITIONER

## 2022-10-05 PROCEDURE — 25010000002 HEPARIN (PORCINE) PER 1000 UNITS: Performed by: NURSE PRACTITIONER

## 2022-10-05 PROCEDURE — 25010000002 SODIUM CHLORIDE 0.9 % WITH KCL 20 MEQ 20-0.9 MEQ/L-% SOLUTION: Performed by: NURSE PRACTITIONER

## 2022-10-05 PROCEDURE — 99024 POSTOP FOLLOW-UP VISIT: CPT | Performed by: NURSE PRACTITIONER

## 2022-10-05 PROCEDURE — 97161 PT EVAL LOW COMPLEX 20 MIN: CPT

## 2022-10-05 PROCEDURE — 80048 BASIC METABOLIC PNL TOTAL CA: CPT | Performed by: NURSE PRACTITIONER

## 2022-10-05 PROCEDURE — 82962 GLUCOSE BLOOD TEST: CPT

## 2022-10-05 PROCEDURE — 85025 COMPLETE CBC W/AUTO DIFF WBC: CPT | Performed by: NURSE PRACTITIONER

## 2022-10-05 PROCEDURE — 80202 ASSAY OF VANCOMYCIN: CPT | Performed by: NEUROLOGICAL SURGERY

## 2022-10-05 PROCEDURE — 97116 GAIT TRAINING THERAPY: CPT

## 2022-10-05 PROCEDURE — 97165 OT EVAL LOW COMPLEX 30 MIN: CPT | Performed by: OCCUPATIONAL THERAPIST

## 2022-10-05 RX ORDER — ERGOCALCIFEROL 1.25 MG/1
50000 CAPSULE ORAL WEEKLY
COMMUNITY
End: 2023-01-25

## 2022-10-05 RX ORDER — DILTIAZEM HCL IN NACL,ISO-OSM 125 MG/125
5-15 PLASTIC BAG, INJECTION (ML) INTRAVENOUS
Status: DISCONTINUED | OUTPATIENT
Start: 2022-10-05 | End: 2022-10-07 | Stop reason: HOSPADM

## 2022-10-05 RX ADMIN — VANCOMYCIN HYDROCHLORIDE 1250 MG: 10 INJECTION, POWDER, LYOPHILIZED, FOR SOLUTION INTRAVENOUS at 13:48

## 2022-10-05 RX ADMIN — GABAPENTIN 300 MG: 300 CAPSULE ORAL at 20:47

## 2022-10-05 RX ADMIN — INSULIN LISPRO 8 UNITS: 100 INJECTION, SOLUTION INTRAVENOUS; SUBCUTANEOUS at 12:10

## 2022-10-05 RX ADMIN — VANCOMYCIN HYDROCHLORIDE 1250 MG: 10 INJECTION, POWDER, LYOPHILIZED, FOR SOLUTION INTRAVENOUS at 23:25

## 2022-10-05 RX ADMIN — DOCUSATE SODIUM 50 MG AND SENNOSIDES 8.6 MG 2 TABLET: 8.6; 5 TABLET, FILM COATED ORAL at 08:24

## 2022-10-05 RX ADMIN — PANTOPRAZOLE SODIUM 40 MG: 40 TABLET, DELAYED RELEASE ORAL at 06:36

## 2022-10-05 RX ADMIN — OXYCODONE AND ACETAMINOPHEN 1 TABLET: 325; 10 TABLET ORAL at 21:52

## 2022-10-05 RX ADMIN — OXYCODONE AND ACETAMINOPHEN 1 TABLET: 325; 10 TABLET ORAL at 04:17

## 2022-10-05 RX ADMIN — METOPROLOL TARTRATE 50 MG: 50 TABLET, FILM COATED ORAL at 20:47

## 2022-10-05 RX ADMIN — INSULIN LISPRO 10 UNITS: 100 INJECTION, SOLUTION INTRAVENOUS; SUBCUTANEOUS at 17:07

## 2022-10-05 RX ADMIN — NICOTINE 1 PATCH: 14 PATCH, EXTENDED RELEASE TRANSDERMAL at 17:06

## 2022-10-05 RX ADMIN — GABAPENTIN 300 MG: 300 CAPSULE ORAL at 16:04

## 2022-10-05 RX ADMIN — LEVETIRACETAM 500 MG: 500 TABLET, FILM COATED ORAL at 20:47

## 2022-10-05 RX ADMIN — HEPARIN SODIUM 5000 UNITS: 5000 INJECTION, SOLUTION INTRAVENOUS; SUBCUTANEOUS at 08:24

## 2022-10-05 RX ADMIN — ASPIRIN 81 MG: 81 TABLET, CHEWABLE ORAL at 08:25

## 2022-10-05 RX ADMIN — ISOSORBIDE MONONITRATE 60 MG: 60 TABLET, EXTENDED RELEASE ORAL at 08:25

## 2022-10-05 RX ADMIN — ZINC SULFATE 220 MG (50 MG) CAPSULE 220 MG: CAPSULE at 08:24

## 2022-10-05 RX ADMIN — POTASSIUM CHLORIDE AND SODIUM CHLORIDE 75 ML/HR: 900; 150 INJECTION, SOLUTION INTRAVENOUS at 04:14

## 2022-10-05 RX ADMIN — LISINOPRIL 20 MG: 20 TABLET ORAL at 08:25

## 2022-10-05 RX ADMIN — HEPARIN SODIUM 5000 UNITS: 5000 INJECTION, SOLUTION INTRAVENOUS; SUBCUTANEOUS at 20:48

## 2022-10-05 RX ADMIN — Medication 5 MG/HR: at 23:25

## 2022-10-05 RX ADMIN — AMLODIPINE BESYLATE 10 MG: 10 TABLET ORAL at 08:25

## 2022-10-05 RX ADMIN — DOCUSATE SODIUM 50 MG AND SENNOSIDES 8.6 MG 2 TABLET: 8.6; 5 TABLET, FILM COATED ORAL at 20:48

## 2022-10-05 RX ADMIN — INSULIN LISPRO 10 UNITS: 100 INJECTION, SOLUTION INTRAVENOUS; SUBCUTANEOUS at 08:19

## 2022-10-05 RX ADMIN — OXYCODONE HYDROCHLORIDE AND ACETAMINOPHEN 500 MG: 500 TABLET ORAL at 08:24

## 2022-10-05 RX ADMIN — LEVETIRACETAM 500 MG: 500 TABLET, FILM COATED ORAL at 08:25

## 2022-10-05 RX ADMIN — ATORVASTATIN CALCIUM 20 MG: 10 TABLET, FILM COATED ORAL at 20:48

## 2022-10-05 RX ADMIN — Medication 24000 UNITS: at 08:24

## 2022-10-05 RX ADMIN — METOPROLOL TARTRATE 50 MG: 50 TABLET, FILM COATED ORAL at 08:24

## 2022-10-05 RX ADMIN — OXYCODONE AND ACETAMINOPHEN 1 TABLET: 325; 10 TABLET ORAL at 17:47

## 2022-10-05 RX ADMIN — GABAPENTIN 300 MG: 300 CAPSULE ORAL at 08:36

## 2022-10-05 RX ADMIN — OXYCODONE AND ACETAMINOPHEN 1 TABLET: 325; 10 TABLET ORAL at 12:41

## 2022-10-05 NOTE — THERAPY TREATMENT NOTE
Acute Care - Physical Therapy Treatment Note  [facility]     Patient Name: Elijah Escobar  : 1955  MRN: [id number]  Today's Date: 10/5/2022      Visit Dx:     ICD-10-CM ICD-9-CM   1. S/P craniotomy  Z98.890 V45.89   2. Decreased activities of daily living (ADL)  Z78.9 V49.89   3. Impaired mobility  Z74.09 799.89     Patient Active Problem List   Diagnosis   • Meningioma s/p craniotomy   • Hypertension   • GERD (gastroesophageal reflux disease)   • Coronary artery disease   • Class 2 severe obesity due to excess calories with serious comorbidity and body mass index (BMI) of 38.0 to 38.9 in adult (HCC)   • Type 2 diabetes mellitus with hyperglycemia and peripheral neuropathy, with long-term current use of insulin ([facility])   • Leukocytosis   • Other specified anemias   • Paroxysmal atrial fibrillation with rapid ventricular response ([facility])   • Atrial fibrillation with RVR ([facility])   • Post-operative infection   • S/P craniotomy   • Smoker   • History of cranioplasty     Past Medical History:   Diagnosis Date   • Brain tumor (HCC)    • Coronary artery disease    • COVID-19 vaccine series completed     MODERNA X 3; LAST DOSE 3/2022   • Diabetes (HCC)    • Erectile dysfunction    • GERD (gastroesophageal reflux disease)    • Hypercholesteremia    • Hypertension      Past Surgical History:   Procedure Laterality Date   • BALLOON ANGIOPLASTY, ARTERY Right    • COLONOSCOPY     • CRANIECTOMY Right 2022    Procedure: CRANIECTOMY WITH INCISIONAL WASHOUT;  Surgeon: Felipe Banerjee MD;  Location: [facility] OR;  Service: Neurosurgery;  Laterality: Right;   • CRANIOPLASTY Right 10/4/2022    Procedure: CRANIOPLASTY right with Lumbar drain;  Surgeon: Flaco Portillo MD;  Location:  [facility] OR;  Service: Neurosurgery;  Laterality: Right;   • CRANIOTOMY FOR TUMOR Right 2022    Procedure: CRANIOTOMY FOR TUMOR STERIOTACTIC WITH BRAIN LAB, right;  Surgeon: Flaco Portillo MD;  Location:  [facility] OR;  Service:  Neurosurgery;  Laterality: Right;     PT Assessment (last 12 hours)     PT Evaluation and Treatment     Row Name 10/05/22 1528          Physical Therapy Time and Intention    Document Type therapy note (daily note)  -LY     Mode of Treatment physical therapy  -LY     Row Name 10/05/22 1528          General Information    Existing Precautions/Restrictions fall  Helmet when OOB, waiting for helmet that fits  -LY     Row Name 10/05/22 1528          Pain    Pretreatment Pain Rating 10/10  -LY     Posttreatment Pain Rating 10/10  -LY     Pain Location - Side/Orientation Bilateral  -LY     Pain Location lower  -LY     Pain Location - extremity  -LY     Pain Intervention(s) Medication (See MAR);Ambulation/increased activity  -LY     Row Name 10/05/22 1528          Bed Mobility    Supine-Sit Bacon (Bed Mobility) standby assist  -LY     Supine-Sit-Supine Bacon (Bed Mobility) standby assist  -LY     Assistive Device (Bed Mobility) bed rails;head of bed elevated  -LY     Row Name 10/05/22 1528          Transfers    Transfers sit-stand transfer;stand-sit transfer  -LY     Sit-Stand Bacon (Transfers) verbal cues;contact guard  -LY     Stand-Sit Bacon (Transfers) verbal cues;contact guard  -LY     Row Name 10/05/22 1528          Sit-Stand Transfer    Assistive Device (Sit-Stand Transfers) walker, front-wheeled  -LY     Row Name 10/05/22 1528          Stand-Sit Transfer    Assistive Device (Stand-Sit Transfers) walker, front-wheeled  -LY     Row Name 10/05/22 1528 10/05/22 0955       Gait/Stairs (Locomotion)    Bacon Level (Gait) verbal cues;contact guard  -LY minimum assist (75% patient effort);contact guard;1 person assist  -LH (r) MF (t) LH (c)    Assistive Device (Gait) walker, front-wheeled  -LY --    Distance in Feet (Gait) 200' x2 with seated rest break  -LY --    Deviations/Abnormal Patterns (Gait) gait speed decreased;stride length decreased  -LY --    Comment, (Gait/Stairs) pt reports  he feels better with use of RWX, impulsive at times  -LY --    Row Name             Wound 06/16/22 0951 Right anterior scalp Incision    Wound - Properties Group Placement Date: 06/16/22  -CHRISTIANO Placement Time: 0951 -CHRISTIANO Side: Right  -CHRISTIANO Orientation: anterior  -CHRISTIANO Location: scalp  -CHRISTIANO Primary Wound Type: Incision  -CHRISTIANO     Retired Wound - Properties Group Placement Date: 06/16/22  -CHRISTIANO Placement Time: 0951  -CHRISTIANO Side: Right  -CHRISTIANO Orientation: anterior  -CHRISTIANO Location: scalp  -CHRISTIANO Primary Wound Type: Incision  -CHRISTIANO     Retired Wound - Properties Group Date first assessed: 06/16/22  -CHRISTIANO Time first assessed: 0951  -CHRISTIANO Side: Right  -CHRISTIANO Location: scalp  -CHRISTIANO Primary Wound Type: Incision  -CHRISTIANO     Row Name             Wound 10/04/22 1242 Bilateral scalp Incision    Wound - Properties Group Placement Date: 10/04/22  -CB Placement Time: 1242  -CB Present on Hospital Admission: N  -CB Side: Bilateral  -CB Location: scalp  -CB Primary Wound Type: Incision  -CB     Retired Wound - Properties Group Placement Date: 10/04/22  -CB Placement Time: 1242  -CB Present on Hospital Admission: N  -CB Side: Bilateral  -CB Location: scalp  -CB Primary Wound Type: Incision  -CB     Retired Wound - Properties Group Date first assessed: 10/04/22  -CB Time first assessed: 1242  -CB Present on Hospital Admission: N  -CB Side: Bilateral  -CB Location: scalp  -CB Primary Wound Type: Incision  -CB     Row Name 10/05/22 1528          Positioning and Restraints    Pre-Treatment Position in bed  -LY     Post Treatment Position bed  -LY     In Bed fowlers;call light within reach;encouraged to call for assist;exit alarm on;with family/caregiver  -LY           User Key  (r) = Recorded By, (t) = Taken By, (c) = Cosigned By    Initials Name Provider Type    Carlos Huerta, PT Physical Therapist    Ricky George, RN Registered Nurse    Cary Martinez, PTA Physical Therapist Assistant    Adrian Mosquera RN Registered Nurse    Heather Sparks, PT  Student PT Student                Physical Therapy Education                 Title: PT OT SLP Therapies (In Progress)     Topic: Physical Therapy (In Progress)     Point: Mobility training (Done)     Learning Progress Summary           Patient Acceptance, E, VU by  at 10/5/2022 0955    Comment: PT frequency, benefits of movement                   Point: Home exercise program (Not Started)     Learner Progress:  Not documented in this visit.          Point: Body mechanics (Not Started)     Learner Progress:  Not documented in this visit.          Point: Precautions (Not Started)     Learner Progress:  Not documented in this visit.                      User Key     Initials Effective Dates Name Provider Type Discipline     08/29/22 -  Heather Arevalo, PT Student PT Student PT              PT Recommendation and Plan             Time Calculation:    PT Charges     Row Name 10/05/22 1605 10/05/22 0955          Time Calculation    Start Time 1528  -LY 0955  - (r) MF (t) LH (c)     Stop Time 1552  -LY 1035  - (r) MF (t)  (c)     Time Calculation (min) 24 min  -LY 40 min  - (r) MF (t)     PT Received On 10/05/22  -LY 10/05/22  - (r) MF (t)  (c)     PT Goal Re-Cert Due Date -- 10/15/22  - (r) MF (t)  (c)            Time Calculation- PT    Total Timed Code Minutes- PT 24 minute(s)  -LY --            Timed Charges    55596 - Gait Training Minutes  24  -LY --            Total Minutes    Timed Charges Total Minutes 24  -LY --      Total Minutes 24  -LY --           User Key  (r) = Recorded By, (t) = Taken By, (c) = Cosigned By    Initials Name Provider Type     Carlos Danielle, PT Physical Therapist    Cary Martinez, PTA Physical Therapist Assistant     Heather Arevalo, PT Student PT Student              Therapy Charges for Today     Code Description Service Date Service Provider Modifiers Qty    [id number] HC GAIT TRAINING EA 15 MIN 10/5/2022 Cary Burnham, PTA GP 2          PT  G-Codes  Outcome Measure Options: AM-PAC 6 Clicks Basic Mobility (PT)  AM-PAC 6 Clicks Score (PT): 18  AM-PAC 6 Clicks Score (OT): 21    Cary Burnham, PTA  10/5/2022

## 2022-10-05 NOTE — CASE MANAGEMENT/SOCIAL WORK
Discharge Planning Assessment  [facility]     Patient Name: Elijah Escobar  MRN: [id number]  Today's Date: 10/5/2022    Admit Date: 10/4/2022        Discharge Needs Assessment     Row Name 10/05/22 1027       Living Environment    People in Home spouse    Name(s) of People in Home Ruth Ann    Current Living Arrangements home    Primary Care Provided by spouse/significant other    Provides Primary Care For no one    Family Caregiver if Needed spouse    Family Caregiver Names Ruth Ann    Able to Return to Prior Arrangements yes       Resource/Environmental Concerns    Resource/Environmental Concerns none       Transition Planning    Patient/Family Anticipates Transition to home with family    Transportation Anticipated family or friend will provide       Discharge Needs Assessment    Readmission Within the Last 30 Days no previous admission in last 30 days    Equipment Currently Used at Home cane, straight    Concerns to be Addressed no discharge needs identified    Equipment Needed After Discharge none    Discharge Coordination/Progress spoke to patient who lives with spouse; has RX coverage [facility] notified Marcy about admission and PCP; independent at home prior to illness will follow for DC needs               Discharge Plan    No documentation.               Continued Care and Services - Admitted Since 10/4/2022    Coordination has not been started for this encounter.     Selected Continued Care - Prior Encounters Includes selections from prior encounters from 7/6/2022 to 10/5/2022    Discharged on 7/11/2022 Admission date: 6/30/2022 - Discharge disposition: Long Term Care (DC - External)    Destination     Service Provider Selected Services Address Phone Fax Patient Preferred    [facility]  Long Term Acute Care [address] [place] [address], 5TH FLOOR, [facility] [id number] [phone] --                       Demographic Summary    No documentation.                 Functional Status    No documentation.                Psychosocial    No documentation.                Abuse/Neglect    No documentation.                Legal    No documentation.                Substance Abuse    No documentation.                Patient Forms    No documentation.                   Ivett Yee RN

## 2022-10-05 NOTE — PLAN OF CARE
Goal Outcome Evaluation:  Plan of Care Reviewed With: patient        Progress: improving  Outcome Evaluation: Recieved pt from ICU, report from Jonny Herring. Pt alert and oriented x4, C/o pain prn meds controlling pain. Dressing on head dry and intact. Assist pt when out of bed. Remind him to turn every 2 hrs. Continue to monitor.

## 2022-10-05 NOTE — THERAPY EVALUATION
Patient Name: Elijah Escobar  : 1955    MRN: [id number]                              Today's Date: 10/5/2022       Admit Date: 10/4/2022    Visit Dx:     ICD-10-CM ICD-9-CM   1. S/P craniotomy  Z98.890 V45.89   2. Decreased activities of daily living (ADL)  Z78.9 V49.89   3. Impaired mobility  Z74.09 799.89     Patient Active Problem List   Diagnosis   • Meningioma s/p craniotomy   • Hypertension   • GERD (gastroesophageal reflux disease)   • Coronary artery disease   • Class 2 severe obesity due to excess calories with serious comorbidity and body mass index (BMI) of 38.0 to 38.9 in adult (HCC)   • Type 2 diabetes mellitus with hyperglycemia and peripheral neuropathy, with long-term current use of insulin (HCC)   • Leukocytosis   • Other specified anemias   • Paroxysmal atrial fibrillation with rapid ventricular response ([facility])   • Atrial fibrillation with RVR ([facility])   • Post-operative infection   • S/P craniotomy   • Smoker   • History of cranioplasty     Past Medical History:   Diagnosis Date   • Brain tumor (HCC)    • Coronary artery disease    • COVID-19 vaccine series completed     MODERNA X 3; LAST DOSE 3/2022   • Diabetes (HCC)    • Erectile dysfunction    • GERD (gastroesophageal reflux disease)    • Hypercholesteremia    • Hypertension      Past Surgical History:   Procedure Laterality Date   • BALLOON ANGIOPLASTY, ARTERY Right    • COLONOSCOPY     • CRANIECTOMY Right 2022    Procedure: CRANIECTOMY WITH INCISIONAL WASHOUT;  Surgeon: Felipe Banerjee MD;  Location:  [facility] OR;  Service: Neurosurgery;  Laterality: Right;   • CRANIOPLASTY Right 10/4/2022    Procedure: CRANIOPLASTY right with Lumbar drain;  Surgeon: Flaco Portillo MD;  Location:  [facility] OR;  Service: Neurosurgery;  Laterality: Right;   • CRANIOTOMY FOR TUMOR Right 2022    Procedure: CRANIOTOMY FOR TUMOR STERIOTACTIC WITH BRAIN LAB, right;  Surgeon: Flaco Portillo MD;  Location:  [facility] OR;  Service:  Neurosurgery;  Laterality: Right;      General Information     Row Name 10/05/22 0954          OT Time and Intention    Document Type evaluation  Pt s/p cranioplasty R with lumbar drain on 10/4. Pt has a hx of meningioma s/p craniotomy and craniectomy in June and July 2022. Imaging shows a contrast enchancing meningeal based mass with dural tails measuring 2.8 x 2.8 x 2.0 cm  -     Mode of Treatment occupational therapy  -     Row Name 10/05/22 0954          General Information    Patient Profile Reviewed yes  -     Prior Level of Function independent:;all household mobility;transfer;bed mobility;ADL's  -     Existing Precautions/Restrictions fall  Helmet when OOB  -     Barriers to Rehab medically complex  -     Row Name 10/05/22 0954          Occupational Profile    Environmental Supports and Barriers (Occupational Profile) tub shower with shower chair  -     Row Name 10/05/22 0954          Living Environment    People in Home spouse  -     Row Name 10/05/22 0954          Home Main Entrance    Number of Stairs, Main Entrance two  -J     Stair Railings, Main Entrance none  -     Row Name 10/05/22 0954          Stairs Within Home, Primary    Number of Stairs, Within Home, Primary none  -J     Stair Railings, Within Home, Primary none  -     Row Name 10/05/22 0954          Cognition    Orientation Status (Cognition) oriented x 4  -     Row Name 10/05/22 0954          Safety Issues, Functional Mobility    Impairments Affecting Function (Mobility) balance;shortness of breath;endurance/activity tolerance  -           User Key  (r) = Recorded By, (t) = Taken By, (c) = Cosigned By    Initials Name Provider Type     Coleen Andres, OTR/L, CSRS Occupational Therapist                 Mobility/ADL's     Row Name 10/05/22 0954          Bed Mobility    Bed Mobility supine-sit  -     Supine-Sit [place] (Bed Mobility) contact guard  -     Supine-Sit-Supine [place] (Bed Mobility)  contact guard;1 person assist  -     Assistive Device (Bed Mobility) bed rails;head of bed elevated  -     Row Name 10/05/22 0954          Transfers    Transfers sit-stand transfer;stand-sit transfer  -     Sit-Stand [place] (Transfers) contact guard  -     Stand-Sit [place] (Transfers) contact guard  -     Row Name 10/05/22 0954          Functional Mobility    Functional Mobility- Ind. Level contact guard assist  -     Row Name 10/05/22 0954          Activities of Daily Living    BADL Assessment/Intervention lower body dressing  -     Row Name 10/05/22 0954          Lower Body Dressing Assessment/Training    [place] Level (Lower Body Dressing) socks;standby assist  -     Position (Lower Body Dressing) edge of bed sitting  -     Comment, (Lower Body Dressing) adjust B socks  -           User Key  (r) = Recorded By, (t) = Taken By, (c) = Cosigned By    Initials Name Provider Type     Coleen Andres, PETER/L, CSRS Occupational Therapist               Obj/Interventions     Row Name 10/05/22 0954          Sensory Assessment (Somatosensory)    Sensory Assessment (Somatosensory) UE sensation intact  -     Row Name 10/05/22 0954          Vision Assessment/Intervention    Visual Impairment/Limitations WNL  -     Row Name 10/05/22 0954          Range of Motion Comprehensive    General Range of Motion no range of motion deficits identified  -     Row Name 10/05/22 0954          Strength Comprehensive (MMT)    General Manual Muscle Testing (MMT) Assessment no strength deficits identified  -     Row Name 10/05/22 0954          Motor Skills    Motor Skills coordination  -     Coordination WNL  -JJ     Row Name 10/05/22 0954          Balance    Balance Assessment sitting static balance;sitting dynamic balance;standing dynamic balance;standing static balance  -     Static Sitting Balance standby assist  -     Dynamic Sitting Balance standby assist  -     Position, Sitting  Balance unsupported;sitting edge of bed  -JJ     Static Standing Balance contact guard  -JJ     Dynamic Standing Balance contact guard  -JJ     Position/Device Used, Standing Balance unsupported  -JJ           User Key  (r) = Recorded By, (t) = Taken By, (c) = Cosigned By    Initials Name Provider Type    TITIColeen Hodges, OTR/L, CSRS Occupational Therapist               Goals/Plan     Row Name 10/05/22 0954          Transfer Goal 1 (OT)    Activity/Assistive Device (Transfer Goal 1, OT) tub  -JJ     [place] Level/Cues Needed (Transfer Goal 1, OT) independent  -JJ     Time Frame (Transfer Goal 1, OT) long term goal (LTG);by discharge  -JJ     Progress/Outcome (Transfer Goal 1, OT) new goal  -JJ     Row Name 10/05/22 0954          Dressing Goal 1 (OT)    Activity/Device (Dressing Goal 1, OT) dressing skills, all  -JJ     [place]/Cues Needed (Dressing Goal 1, OT) independent  -JJ     Time Frame (Dressing Goal 1, OT) long term goal (LTG);by discharge  -JJ     Progress/Outcome (Dressing Goal 1, OT) new goal  -JJ     Row Name 10/05/22 0954          Toileting Goal 1 (OT)    Activity/Device (Toileting Goal 1, OT) toileting skills, all  -JJ     [place] Level/Cues Needed (Toileting Goal 1, OT) independent  -JJ     Time Frame (Toileting Goal 1, OT) long term goal (LTG);by discharge  -JJ     Progress/Outcome (Toileting Goal 1, OT) new goal  -JJ     Row Name 10/05/22 0954          Therapy Assessment/Plan (OT)    Planned Therapy Interventions (OT) activity tolerance training;adaptive equipment training;BADL retraining;functional balance retraining;transfer/mobility retraining;occupation/activity based interventions;patient/caregiver education/training  -JJ           User Key  (r) = Recorded By, (t) = Taken By, (c) = Cosigned By    Initials Name Provider Type    TITIColeen Hodges, OTR/L, CSRS Occupational Therapist               Clinical Impression     Row Name 10/05/22 0954          Pain Assessment     Pretreatment Pain Rating 0/10 - no pain  -J     Posttreatment Pain Rating 0/10 - no pain  -JJ     Row Name 10/05/22 0954          Plan of Care Review    Plan of Care Reviewed With patient  -     Outcome Evaluation OT eval completed. Pt presents alert and oriented x4, resting comfortably in bed. He reports at baseline being independent with all adls and mobility with use of rwx. Today he was able to bring self to sitting at EOB with CGA. Able to don socks while seated at EOB with SBA and vcs. CGA for sit <> stand t/f and all functional mobility. B UE strength 5/5, intact FMC/GMC and sensation. He would benefit from skilled OT services to assist with return to PLOF and safe d/c home. Recommend d/c home with assist and HH.  -     Row Name 10/05/22 0954          Therapy Assessment/Plan (OT)    Rehab Potential (OT) good, to achieve stated therapy goals  -     Criteria for Skilled Therapeutic Interventions Met (OT) yes;skilled treatment is necessary  -     Therapy Frequency (OT) 3 times/wk  -     Predicted Duration of Therapy Intervention (OT) 10 days  -     Row Name 10/05/22 0954          Therapy Plan Review/Discharge Plan (OT)    Anticipated Discharge Disposition (OT) home with home health  -     Row Name 10/05/22 0954          Positioning and Restraints    Pre-Treatment Position in bed  -     Post Treatment Position bed  -JJ     In Bed notified nsg;fowlers;call light within reach;encouraged to call for assist;side rails up x3;exit alarm on;patient within staff view  -           User Key  (r) = Recorded By, (t) = Taken By, (c) = Cosigned By    Initials Name Provider Type    Coleen Martinez, PETER/L, CSRS Occupational Therapist               Outcome Measures     Row Name 10/05/22 0954          How much help from another is currently needed...    Putting on and taking off regular lower body clothing? 3  -JJ     Bathing (including washing, rinsing, and drying) 3  -JJ     Toileting (which includes  using toilet bed pan or urinal) 3  -JJ     Putting on and taking off regular upper body clothing 4  -JJ     Taking care of personal grooming (such as brushing teeth) 4  -JJ     Eating meals 4  -JJ     AM-PAC 6 Clicks Score (OT) 21  -JJ     Row Name 10/05/22 0955          How much help from another person do you currently need...    Turning from your back to your side while in flat bed without using bedrails? 4  -LH (r) MF (t) LH (c)     Moving from lying on back to sitting on the side of a flat bed without bedrails? 3  -LH (r) MF (t) LH (c)     Moving to and from a bed to a chair (including a wheelchair)? 3  -LH (r) MF (t) LH (c)     Standing up from a chair using your arms (e.g., wheelchair, bedside chair)? 3  -LH (r) MF (t) LH (c)     Climbing 3-5 steps with a railing? 2  -LH (r) MF (t) LH (c)     To walk in hospital room? 3  -LH (r) MF (t) LH (c)     AM-PAC 6 Clicks Score (PT) 18  -LH (r) MF (t)     Highest level of mobility 6 --> Walked 10 steps or more  -LH (r) MF (t)     Row Name 10/05/22 0955 10/05/22 0954       Functional Assessment    Outcome Measure Options AM-PAC 6 Clicks Basic Mobility (PT)  -LH (r) MF (t) LH (c) AM-PAC 6 Clicks Daily Activity (OT)  -          User Key  (r) = Recorded By, (t) = Taken By, (c) = Cosigned By    Initials Name Provider Type    LH Carlos Danielle, PT Physical Therapist    Coleen Martinez, ERONR/L, CSRS Occupational Therapist    Heather Sparks, PT Student PT Student                Occupational Therapy Education                 Title: PT OT SLP Therapies (In Progress)     Topic: Occupational Therapy (In Progress)     Point: ADL training (Done)     Description:   Instruct learner(s) on proper safety adaptation and remediation techniques during self care or transfers.   Instruct in proper use of assistive devices.              Learning Progress Summary           Patient Acceptance, JACK, VU by MENDEL at 10/5/2022 1035                   Point: Home exercise program (Not  Started)     Description:   Instruct learner(s) on appropriate technique for monitoring, assisting and/or progressing therapeutic exercises/activities.              Learner Progress:  Not documented in this visit.          Point: Precautions (Done)     Description:   Instruct learner(s) on prescribed precautions during self-care and functional transfers.              Learning Progress Summary           Patient Donaldo, JACK, VU by MENDEL at 10/5/2022 1035                   Point: Body mechanics (Not Started)     Description:   Instruct learner(s) on proper positioning and spine alignment during self-care, functional mobility activities and/or exercises.              Learner Progress:  Not documented in this visit.                      User Key     Initials Effective Dates Name Provider Type Discipline    MENDEL 11/10/21 -  Coleen Andres, OTR/L, CSRS Occupational Therapist OT              OT Recommendation and Plan  Planned Therapy Interventions (OT): activity tolerance training, adaptive equipment training, BADL retraining, functional balance retraining, transfer/mobility retraining, occupation/activity based interventions, patient/caregiver education/training  Therapy Frequency (OT): 3 times/wk  Plan of Care Review  Plan of Care Reviewed With: patient  Outcome Evaluation: OT eval completed. Pt presents alert and oriented x4, resting comfortably in bed. He reports at baseline being independent with all adls and mobility with use of rwx. Today he was able to bring self to sitting at EOB with CGA. Able to don socks while seated at EOB with SBA and vcs. CGA for sit <> stand t/f and all functional mobility. B UE strength 5/5, intact FMC/GMC and sensation. He would benefit from skilled OT services to assist with return to PLOF and safe d/c home. Recommend d/c home with assist and HH.     Time Calculation:    Time Calculation- OT     Row Name 10/05/22 0954             Time Calculation- OT    OT Start Time 0954  -MENDEL      OT  Stop Time 1035  -MENDEL      OT Time Calculation (min) 41 min  -MENDEL      OT Received On 10/05/22  -MENDEL      OT Goal Re-Cert Due Date 10/15/22  -MENDEL            User Key  (r) = Recorded By, (t) = Taken By, (c) = Cosigned By    Initials Name Provider Type    Coleen Martinez OTR/L, CSRS Occupational Therapist              Therapy Charges for Today     Code Description Service Date Service Provider Modifiers Qty    [id number] HC OT EVAL LOW COMPLEXITY 3 10/5/2022 Coleen Andres OTR/L, CSRS GO 1               PETER [place]/L, CSRS  10/5/2022

## 2022-10-05 NOTE — PAYOR COMM NOTE
"AUTH: [id number]    Virgil Escobar (66 y.o. Male)             Date of Birth   1955    Social Security Number       Address   [address] [place] [address]    Home Phone   [phone]    MRN   [id number]       Sikhism   Non-Congregation    Marital Status                               Admission Date   10/4/22    Admission Type   Elective    Admitting Provider   Flaco Portillo MD    Attending Provider   Flaco Portillo MD    Department, Room/Bed   [facility] 3A, 334/1       Discharge Date       Discharge Disposition       Discharge Destination                               Attending Provider: Flaco Portillo MD    Allergies: No Known Allergies    Isolation: None   Infection: None   Code Status: CPR   Advance Care Planning Activity    Ht: 177.8 cm (70\")   Wt: 115 kg (254 lb 3.2 oz)    Admission Cmt: None   Principal Problem: S/P craniotomy [Z98.890] More...                 Active Insurance as of 10/4/2022     Primary Coverage     Payor Plan Insurance Group Employer/Plan Group    ANTH MEDICARE REPLACEMENT ANTH MEDICARE ADVANTAGE [id number]     Payor Plan Address Payor Plan Phone Number Payor Plan Fax Number Effective Dates    [address] [phone]  1/1/2022 - None Entered    [facility] [id number]       Subscriber Name Subscriber Birth Date Member ID       VIRGIL ESCOBAR 1955 [id number]                 Emergency Contacts      (Rel.) Home Phone Work Phone Mobile Phone    Cathy Guzmán (Son) -- -- [phone]    Manju Lua (Relative) -- -- [phone]    MAYTE ESCOBAR (Spouse) [phone] -- --    david lua (Sister) [phone] -- --    josie benavides (Sister) -- -- [phone]               Operative/Procedure Notes (last 48 hours)      Flaco Portillo MD at 10/04/22 1213          NEUROSURGERY OPERATIVE NOTE    Virgil Escobar  OR Date: 10/4/2022    Pre-op Diagnosis:   S/P craniotomy [Z98.890]    Post-op " Diagnosis:     Post-Op Diagnosis Codes:     * S/P craniotomy [Z98.890]          Surgeon(s):  Flaco Portillo MD    Anesthesia: General    Staff:   Circulator: Adrian Petty RN; Kierra Irby RN  Scrub Person: Sadie Escobedo; Millie Ureña; Marianna Samano    Procedure(s):  CRANIOPLASTY right with Lumbar drain    INDICATIONS FOR PROCEDURE:   Elijah Escobar is a 66 y.o. male with a significant comorbidity of coronary artery disease, diabetes, erectile dysfunction, GERD, hypertension, hyperlipidemia. He presents with a new problem of 2 episodes of intermittent amnesia suggestive of seizures. Physical exam findings of neurologically intact.  His imaging shows gross total resection of meningioma with large cranial defect and no evidence of infection. .    We discussed the risks of a  bifrontal craniotomy with stereotactic localization, including but not limited to infection, bleeding, damage to local structures, CSF leak, seizures, hydrocephalus, weakness, numbness, paralysis, or loss of bowel and bladder function, stroke, coma, MI, or death.    DESCRIPTION OF OPERATION AND FINDINGS:   On the day of surgery, he was brought to the preoperative holding area where IV access was obtained. Prophylactic intravenous antibiotics were administered. He was then brought to the major operative suite. While on the [facility], the patient underwent an uneventful induction of general anesthetic with placement of endotracheal tube, TEDs, SCD hoses, and a Jeong catheter were applied.      Lumbar Drain -  Timeout was performed.  The patient was placed in the left lateral position and the lumbar region was prepped and draped in the routine manner. The region was thoroughly infiltrated with lidocaine with epinephrine 1-100,000.  IV antibiotics were given.  A Tuohy needle was inserted into the L4-5 interspace.  The thecal sac was entered on the first attempt.  The bed was placed in reverse Trendelenburg and had  brisk flow of CSF. The catheter was inserted through the Tuohy needle without difficulty.  Only 15 cm of the catheter could be easily advanced.  Excellent outflow of CSF was obtained so the needle was removed. The catheter was secured. The system was connected to a drainage system and clamped.  The drain was secured and the patient was repositioned in the supine position.    Cranioplasty -  The patient was placed in the supine position on a standard operating table.  All pressure points were inspected and appropriately padded, and he was secured to the table.  The head was turned slightly to the left. The head was positioned on a horse shoe.    The hair was then clipped along the incision line. The entire scalp was prepped with alcohol, Betadine, and DuraPrep and allowed to dry for 3 minutes.  He was then draped in the usual fashion.  At this point a WHO surgical timeout was performed with all members of the surgical team.      The skin was infiltrated with quarter percent Marcaine with epinephrine.  The prior bicoronal skin incision was opened with a #15 scalpel.  Skin incision and Bovie cautery were then used to go through the galea.  Cintia clips were then placed along the skin.  The flap was then mobilized in a subgaleal plane anteriorly and posteriorly.  This was done until we encountered a fat pad overlying the temporalis muscle on the right.  To prevent destruction of the facial nerve the temporalis fascia and muscle was then incised.      Next we began by working circumferentially around the outside of the previous craniotomy.  This was brought to the bone edge and then scar tissue was disconnected from the underlying dura.  Generally the dura was found to be intact with only 2 small areas with a brief amount of CSF egress.  These were quickly closed with 4-0 Nurolon.  We continued around till the complete craniotomy was exposed.  Penfield 1 was then used to just roll the scar tissue internally.  Any  excessive scar tissue was then debulked.  The cavity was gently irrigated multiple times with irrigation. Next the preplanned manufactured cranioplasty was brought onto the field.  Vancomycin was laid underneath the cranioplasty flap.  The flap was then checked for fit and bur hole covers were used to connected to this skull with small titanium screws.    Again the extracranial space was thoroughly irrigated with 1 L of irrigation.  Next a second 7 Polish flat JUAN was placed in the subgaleal space and tunneled posteriorly.  The muscle fascia galea was closed with interrupted 2-0 Vicryl sutures.  4-0 Monocryl was used for skin closure.  Incision was dressed with Xeroform and the patient's head was wrapped..    The patient was awoken and found to be at his neurological baseline.    There were no observed complications. The needle, sponge, and cottonoid counts were correct.        Estimated Blood Loss: 200ml    Complications: None    Implants:   Implant Name Type Inv. Item Serial No.  Lot No. LRB No. Used Action   HEMOST ABS SURGICEL 4X8IN - [id number] Implant HEMOST ABS SURGICEL 4X8IN  [employer]  DIV [employer] J [employer] J  Right 1 Implanted   HEMOST ABS SURGIFOAM  8X12 10MM - [id number] Implant HEMOST ABS SURGIFOAM  8X12 10MM  [employer]  DIV OF J AND J [id number] Right 1 Implanted   KT HEMOST ABS SURGIFOAM PORCN 1GRAM - [id number] Implant KT HEMOST ABS SURGIFOAM PORCN 1GRAM  [employer]  DIV OF J AND J [id number] Right 1 Implanted   PLT BURHL LEFORTE PRE/CONTRD TI 5HL 0.3X22MM GRN - [id number] Implant PLT BURHL LEFORTE PRE/CONTRD TI 5HL 0.3X22MM GRN  [employer]  Right 4 Implanted   SCRW PLT NEURO LEFORTE L/P SLF/DRL 1.4X3.7MM SLVR - [id number] Implant SCRW PLT NEURO LEFORTE L/P SLF/DRL 1.4X3.7MM SLVR  [employer]  Right 19 Implanted       Specimens:                Specimens     ID Source Type Tests Collected By Collected At Frozen?    1 Brain Cerebrospinal Fluid · GLUCOSE, CSF  · PROTEIN, CSF  · VDRL, CSF  · CELL COUNT  WITH DIFFERENTIAL, CSF  · CULTURE, CSF  · BODY FLUID CULTURE (Canceled)   Flaco Portillo MD 10/4/22 1201     Description: CSF            Drains:   Closed/Suction Drain 1 Right Scalp Bulb 7 Fr. (Active)           Electronically signed by Flaco Portillo MD at 10/04/22 1436          Physician Progress Notes (last 48 hours)      Rahul Arnold APRN at 10/05/22 0836          NEUROSURGERY DAILY PROGRESS NOTE    ASSESSMENT:   Elijah Escobar is a 66 y.o. with a significant comorbidity of coronary artery disease, diabetes, erectile dysfunction, GERD, hypertension, hyperlipidemia. He presents with a new problem of 2 episodes of intermittent amnesia suggestive of seizures. Physical exam findings of left pronator drift and dysmetria with left finger-nose otherwise neurologically intact.  His imaging shows avidly contrast-enhancing meningeal based mass with dural tails measuring 2.8 x 2.8 x 2.0 cm with surrounding FLAIR signal.    Past Medical History:   Diagnosis Date   • Brain tumor (HCC)    • Coronary artery disease    • COVID-19 vaccine series completed     MODERNA X 3; LAST DOSE 3/2022   • Diabetes (HCC)    • Erectile dysfunction    • GERD (gastroesophageal reflux disease)    • Hypercholesteremia    • Hypertension      Active Hospital Problems    Diagnosis    • **S/P craniotomy      Added automatically from request for surgery [id number]     • History of cranioplasty      PLAN:   Neuro: Stable.  Intact.   1 Day Post-Op (10/4/2022) cranioplasty   CSF unremarkable   Postop CT stable   JUAN was 60 mL   Continue neurochecks per policy.  Call for decline.   Continue Keppra   Transfer to floor    CV: No acute issues.  Continuous cardiac monitoring.  Cardene to keep SBP <140.  Pulm: Maintaining O2 sat.  Continuous pulse oximetry.  Incentive spirometry.   : Voiding spontaneously.  Bladder scans and I/O cath per policy.   FEN: Tolerating regular diet.    ENDO: Accu-Chek and treat per policy  GI: No acute issues.   "  ID: Given history of postoperative infection, continue prophylactic antibiotics.     Wound healing protocol  Heme:  DVT prophylaxis; SCD's  Pain: Tolerable at present with oral meds.     Dispo: PT/OT.       CHIEF COMPLAINT:   Postoperative infection    Subjective  Symptom stable.  Doing well    Temp:  [96.9 °F (36.1 °C)-98.2 °F (36.8 °C)] 98.1 °F (36.7 °C)  Heart Rate:  [56-81] 81  Resp:  [12-16] 16  BP: (103-151)/() 123/59    Objective:  General Appearance:  Well-appearing and in no acute distress.    Vital signs: (most recent): Blood pressure 123/59, pulse 81, temperature 98.1 °F (36.7 °C), temperature source Oral, resp. rate 16, height 177.8 cm (70\"), weight 115 kg (254 lb 3.2 oz), SpO2 95 %.      Neurologic Exam     Mental Status   Oriented to person, place, and time.   Attention: normal. Concentration: normal.   Speech: speech is normal   Level of consciousness: alert    Bright and awake.  Oriented x3.  Follows commands without prompting showing thumbs up and 2 fingers bilaterally.     Cranial Nerves     CN II   Visual fields full to confrontation.     CN III, IV, VI   Pupils are equal, round, and reactive to light.  Extraocular motions are normal.     CN V   Facial sensation intact.     CN VII   Facial expression full, symmetric.     CN VIII   CN VIII normal.     CN IX, X   CN IX normal.     CN XI   CN XI normal.     Motor Exam   Right arm tone: normal  Left arm tone: normal  Right arm pronator drift: absent  Left arm pronator drift: absent  Right leg tone: normal  Left leg tone: normal    Strength   Right deltoid: 5/5  Left deltoid: 5/5  Right biceps: 5/5  Left biceps: 5/5  Right triceps: 5/5  Left triceps: 5/5  Right wrist extension: 5/5  Left wrist extension: 5/5  Right iliopsoas: 5/5  Left iliopsoas: 5/5  Right quadriceps: 5/5  Left quadriceps: 5/5  Right anterior tibial: 5/5  Left anterior tibial: 5/5  Right gastroc: 5/5  Left gastroc: 5/5  Right EHL 5/5  Left EHL 5/5       Sensory Exam   Right " arm light touch: normal  Left arm light touch: normal  Right leg light touch: normal  Left leg light touch: normal    Gait, Coordination, and Reflexes     Tremor   Resting tremor: absent  Intention tremor: absent  Action tremor: absent    Drains:   Closed/Suction Drain 1 Right Scalp Bulb 7 Fr. (Active)       Output by Drain (mL) 10/04/22 0701 - 10/04/22 1900 10/04/22 1901 - 10/05/22 0700 10/05/22 0701 - 10/05/22 0836 Range Total   Closed/Suction Drain 1 Right Scalp Bulb 7 Fr. 30 30  60       Imaging Results (Last 24 Hours)     Procedure Component Value Units Date/Time    CT Head Without Contrast [[id number]] Collected: 10/04/22 1516     Updated: 10/04/22 1530    Narrative:      EXAMINATION: CT HEAD WO CONTRAST-      10/4/2022 3:00 PM CDT     HISTORY: Eval status post cranioplasty; Z98.890-Other specified  postprocedural states     In order to have a CT radiation dose as low as reasonably achievable  Automated Exposure Control was utilized for adjustment of the mA and/or  KV according to patient size.     DLP in mGycm= 845     The CT scan of the head is performed without intravenous contrast  enhancement.     Images are acquired in axial plane with subsequent reconstruction in  coronal and sagittal plane.     Comparison is made with the previous study dated 8/2/2022. Correlation  made with MR imaging of the brain dated 8/31/2022.     There is a low-density area involving the right frontal lobe which  appears to represent a combination of vasogenic edema and  encephalomalacia.     There is an extra-axial fluid accumulation in the right frontal lobe  temporal region which measures 3.2 x 0.9 cm. This have increased in the  left since the previous study.     There is a mild shift to the left, 1.7 mm, which was not seen in the  previous study.     There is a right frontal cranioplasty which is a change since the  previous study. There is small amount of fluid and air along the right  frontoparietal and temporal scalp.  There is a subgaleal drain in place  in this area.     There is effacement of the right frontoparietal cortical sulci.     There is moderate asymmetrical ventricular dilatation, left more than  the right. This is similar to the previous study.     There is a left frontal subgaleal fluid and air which was not seen in  the previous study. This may be postsurgical changes.       Impression:      1. The intervertebral right frontal cranioplasty.  2. Right frontal vasogenic edema and encephalomalacia.  3. A 1.7 cm shift to the left which was not seen in the previous study.  4. A mild increase in right frontotemporal extra-axial fluid  accumulation adjacent and posterior to the cranioplasty.  5. Right frontoparietal and temporal subgaleal fluid and air and  subgaleal drain in place.  6. A new left frontal subgaleal fluid and air which may be postsurgical.                             This report was finalized on 10/04/2022 15:27 by Dr. Michelle Vigil MD.        Lab Results (last 24 hours)     Procedure Component Value Units Date/Time    Basic Metabolic Panel [[id number]]  (Abnormal) Collected: 10/05/22 0723    Specimen: Blood Updated: 10/05/22 0753     Glucose 329 mg/dL      BUN 21 mg/dL      Creatinine 0.88 mg/dL      Sodium 135 mmol/L      Potassium 4.9 mmol/L      Comment: Slight hemolysis detected by analyzer. Results may be affected.        Chloride 102 mmol/L      CO2 18.0 mmol/L      Calcium 8.2 mg/dL      BUN/Creatinine Ratio 23.9     Anion Gap 15.0 mmol/L      eGFR 94.8 mL/min/1.73      Comment: National Kidney Foundation and American Society of Nephrology (ASN) Task Force recommended calculation based on the Chronic Kidney Disease Epidemiology Collaboration (CKD-EPI) equation refit without adjustment for race.       Narrative:      GFR Normal >60  Chronic Kidney Disease <60  Kidney Failure <15      POC Glucose Once [[id number]]  (Abnormal) Collected: 10/05/22 0641    Specimen: Blood Updated: 10/05/22 0652      Glucose 308 mg/dL      Comment: : [id number] Debbie GallagherwellMeter ID: [id number]       CBC & Differential [[id number]]  (Abnormal) Collected: 10/05/22 0555    Specimen: Blood Updated: 10/05/22 0602    Narrative:      The following orders were created for panel order CBC & Differential.  Procedure                               Abnormality         Status                     ---------                               -----------         ------                     CBC Auto Differential[[id number]]        Abnormal            Final result                 Please view results for these tests on the individual orders.    CBC Auto Differential [[id number]]  (Abnormal) Collected: 10/05/22 0555    Specimen: Blood Updated: 10/05/22 0602     WBC 14.28 10*3/mm3      RBC 4.17 10*6/mm3      Hemoglobin 12.4 g/dL      Hematocrit 38.9 %      MCV 93.3 fL      MCH 29.7 pg      MCHC 31.9 g/dL      RDW 13.0 %      RDW-SD 44.3 fl      MPV 11.2 fL      Platelets 203 10*3/mm3      Neutrophil % 80.0 %      Lymphocyte % 13.9 %      Monocyte % 4.7 %      Eosinophil % 0.0 %      Basophil % 0.2 %      Immature Grans % 1.2 %      Neutrophils, Absolute 11.42 10*3/mm3      Lymphocytes, Absolute 1.99 10*3/mm3      Monocytes, Absolute 0.67 10*3/mm3      Eosinophils, Absolute 0.00 10*3/mm3      Basophils, Absolute 0.03 10*3/mm3      Immature Grans, Absolute 0.17 10*3/mm3      nRBC 0.0 /100 WBC     POC Glucose Once [[id number]]  (Abnormal) Collected: 10/04/22 1759    Specimen: Blood Updated: 10/04/22 1810     Glucose 258 mg/dL      Comment: : [email or web address] Quinten RyanMeter ID: [id number]       POC Glucose Once [[id number]]  (Abnormal) Collected: 10/04/22 1426    Specimen: Blood Updated: 10/04/22 1428     Glucose 190 mg/dL      Comment: : [id number] Kp GiraldouaMeter ID: [id number]       Culture, CSF - Cerebrospinal Fluid, Brain [[id number]] Collected: 10/04/22 1201    Specimen: Cerebrospinal Fluid from Brain Updated: 10/04/22 1414     Gram Stain Few  (2+) WBCs seen      No organisms seen    Cell Count With Differential, CSF [[id number]]  (Abnormal) Collected: 10/04/22 1201    Specimen: Cerebrospinal Fluid from Brain Updated: 10/04/22 1330    Narrative:      The following orders were created for panel order Cell Count With Differential, CSF.  Procedure                               Abnormality         Status                     ---------                               -----------         ------                     Cell Count, CSF - Cerebr...[[id number]]  Abnormal            Final result               Spinal fluid differentia...[[id number]]                      Final result                 Please view results for these tests on the individual orders.    Spinal fluid differential - Cerebrospinal Fluid, Lumbar Puncture [[id number]] Collected: 10/04/22 1201    Specimen: Cerebrospinal Fluid from Brain Updated: 10/04/22 1330     Lymphocytes, CSF 72 %      Monocytes, CSF 28 %     Cell Count, CSF - Cerebrospinal Fluid, Lumbar Puncture [[id number]]  (Abnormal) Collected: 10/04/22 1201    Specimen: Cerebrospinal Fluid from Brain Updated: 10/04/22 1252     Color, CSF Colorless     Supernatant Color, CSF Colorless     Appearance, CSF Clear     RBC, CSF 0 /mm3      Nucleated Cells, CSF 12 /mm3      Volume, CSF 7.5 mL      Tube Number, CSF 1     Method: Hemacytometer Method    Protein, CSF - Cerebrospinal Fluid, Brain [[id number]]  (Abnormal) Collected: 10/04/22 1201    Specimen: Cerebrospinal Fluid from Brain Updated: 10/04/22 1246     Protein, Total (CSF) 114.2 mg/dL     Glucose, CSF - Cerebrospinal Fluid, Brain [[id number]]  (Abnormal) Collected: 10/04/22 1201    Specimen: Cerebrospinal Fluid from Brain Updated: 10/04/22 1246     Glucose, CSF 94 mg/dL     VDRL, CSF - Cerebrospinal Fluid, Brain [[id number]] Collected: 10/04/22 1201    Specimen: Cerebrospinal Fluid from Brain Updated: 10/04/22 1213    POC Glucose Once [[id number]]  (Abnormal) Collected: 10/04/22 1038    Specimen: Blood  Updated: 10/04/22 1050     Glucose 194 mg/dL      Comment: : [id number] Nuñez ToniMeter ID: [id number]           [id number]  ADITYA Cespedes      Electronically signed by Rahul Arnold APRN at 10/05/22 0837     Rahul Arnold APRN at 10/04/22 8604          NEUROSURGERY DAILY PROGRESS NOTE    ASSESSMENT:   Elijah Escobar is a 66 y.o. with a significant comorbidity of coronary artery disease, diabetes, erectile dysfunction, GERD, hypertension, hyperlipidemia. He presents with a new problem of 2 episodes of intermittent amnesia suggestive of seizures. Physical exam findings of left pronator drift and dysmetria with left finger-nose otherwise neurologically intact.  His imaging shows avidly contrast-enhancing meningeal based mass with dural tails measuring 2.8 x 2.8 x 2.0 cm with surrounding FLAIR signal.    Past Medical History:   Diagnosis Date   • Brain tumor (HCC)    • Coronary artery disease    • COVID-19 vaccine series completed     MODERNA X 3; LAST DOSE 3/2022   • Diabetes (HCC)    • Erectile dysfunction    • GERD (gastroesophageal reflux disease)    • Hypercholesteremia    • Hypertension      Active Hospital Problems    Diagnosis    • **S/P craniotomy      Added automatically from request for surgery [id number]     • History of cranioplasty      PLAN:   Neuro: Stable.  Intact.   Day of Surgery (10/4/2022) cranioplasty   Postop CT pending   Empty drain as needed   ICU postop for close neurologic monitoring.  Neurochecks per policy.  Call for decline.   Continue Keppra  CV: No acute issues.  Continuous cardiac monitoring.  Cardene to keep SBP <140.  Pulm: Maintaining O2 sat.  Continuous pulse oximetry.  Incentive spirometry.   : Remove Jeong ASAP, bladder scans and I/O cath per policy.   FEN: Regular diet.  Advance as tolerated.   ENDO: Accu-Chek and treat per policy  GI: No acute issues.    ID: 23-hour postoperative prophylactic antibiotics.  Wound healing protocol  Heme:  DVT prophylaxis; SCD's  Pain:  "Tolerable at present with oral meds.     Dispo: PT/OT.       CHIEF COMPLAINT:   Postoperative infection    Subjective  Symptom stable.  Doing well    Temp:  [96.9 °F (36.1 °C)-98.2 °F (36.8 °C)] 98.2 °F (36.8 °C)  Heart Rate:  [56-76] 71  Resp:  [12-16] 16  BP: (103-151)/(59-89) 129/65    Objective:  General Appearance:  Well-appearing and in no acute distress.    Vital signs: (most recent): Blood pressure 129/65, pulse 71, temperature 98.2 °F (36.8 °C), temperature source Oral, resp. rate 16, height 177.8 cm (70\"), weight 116 kg (255 lb 11.7 oz), SpO2 94 %.        Neurologic Exam     Mental Status   Oriented to person, place, and time.   Attention: normal. Concentration: normal.   Speech: speech is normal   Level of consciousness: alert    Bright and awake.  Oriented x3.  Follows commands without prompting showing thumbs up and 2 fingers bilaterally.     Cranial Nerves     CN II   Visual fields full to confrontation.     CN III, IV, VI   Pupils are equal, round, and reactive to light.  Extraocular motions are normal.     CN V   Facial sensation intact.     CN VII   Facial expression full, symmetric.     CN VIII   CN VIII normal.     CN IX, X   CN IX normal.     CN XI   CN XI normal.     Motor Exam   Right arm tone: normal  Left arm tone: normal  Right arm pronator drift: absent  Left arm pronator drift: absent  Right leg tone: normal  Left leg tone: normal    Strength   Right deltoid: 5/5  Left deltoid: 5/5  Right biceps: 5/5  Left biceps: 5/5  Right triceps: 5/5  Left triceps: 5/5  Right wrist extension: 5/5  Left wrist extension: 5/5  Right iliopsoas: 5/5  Left iliopsoas: 5/5  Right quadriceps: 5/5  Left quadriceps: 5/5  Right anterior tibial: 5/5  Left anterior tibial: 5/5  Right gastroc: 5/5  Left gastroc: 5/5  Right EHL 5/5  Left EHL 5/5       Sensory Exam   Right arm light touch: normal  Left arm light touch: normal  Right leg light touch: normal  Left leg light touch: normal    Gait, Coordination, and " Reflexes     Tremor   Resting tremor: absent  Intention tremor: absent  Action tremor: absent    Drains:   Closed/Suction Drain 1 Right Scalp Bulb 7 Fr. (Active)       Output by Drain (mL) 10/03/22 0701 - 10/03/22 1900 10/03/22 1901 - 10/04/22 0700 10/04/22 0701 - 10/04/22 1748 Range Total   Closed/Suction Drain 1 Right Scalp Bulb 7 Fr.   30 30       Imaging Results (Last 24 Hours)     Procedure Component Value Units Date/Time    CT Head Without Contrast [[id number]] Collected: 10/04/22 1516     Updated: 10/04/22 1530    Narrative:      EXAMINATION: CT HEAD WO CONTRAST-      10/4/2022 3:00 PM CDT     HISTORY: Eval status post cranioplasty; Z98.890-Other specified  postprocedural states     In order to have a CT radiation dose as low as reasonably achievable  Automated Exposure Control was utilized for adjustment of the mA and/or  KV according to patient size.     DLP in mGycm= 845     The CT scan of the head is performed without intravenous contrast  enhancement.     Images are acquired in axial plane with subsequent reconstruction in  coronal and sagittal plane.     Comparison is made with the previous study dated 8/2/2022. Correlation  made with MR imaging of the brain dated 8/31/2022.     There is a low-density area involving the right frontal lobe which  appears to represent a combination of vasogenic edema and  encephalomalacia.     There is an extra-axial fluid accumulation in the right frontal lobe  temporal region which measures 3.2 x 0.9 cm. This have increased in the  left since the previous study.     There is a mild shift to the left, 1.7 mm, which was not seen in the  previous study.     There is a right frontal cranioplasty which is a change since the  previous study. There is small amount of fluid and air along the right  frontoparietal and temporal scalp. There is a subgaleal drain in place  in this area.     There is effacement of the right frontoparietal cortical sulci.     There is moderate  asymmetrical ventricular dilatation, left more than  the right. This is similar to the previous study.     There is a left frontal subgaleal fluid and air which was not seen in  the previous study. This may be postsurgical changes.       Impression:      1. The intervertebral right frontal cranioplasty.  2. Right frontal vasogenic edema and encephalomalacia.  3. A 1.7 cm shift to the left which was not seen in the previous study.  4. A mild increase in right frontotemporal extra-axial fluid  accumulation adjacent and posterior to the cranioplasty.  5. Right frontoparietal and temporal subgaleal fluid and air and  subgaleal drain in place.  6. A new left frontal subgaleal fluid and air which may be postsurgical.                             This report was finalized on 10/04/2022 15:27 by Dr. Michelle Vigil MD.        Lab Results (last 24 hours)     Procedure Component Value Units Date/Time    POC Glucose Once [[id number]]  (Abnormal) Collected: 10/04/22 1426    Specimen: Blood Updated: 10/04/22 1428     Glucose 190 mg/dL      Comment: : [id number] Kp GiraldouaMeter ID: [id number]       Culture, CSF - Cerebrospinal Fluid, Brain [[id number]] Collected: 10/04/22 1201    Specimen: Cerebrospinal Fluid from Brain Updated: 10/04/22 1414     Gram Stain Few (2+) WBCs seen      No organisms seen    Cell Count With Differential, CSF [[id number]]  (Abnormal) Collected: 10/04/22 1201    Specimen: Cerebrospinal Fluid from Brain Updated: 10/04/22 1330    Narrative:      The following orders were created for panel order Cell Count With Differential, CSF.  Procedure                               Abnormality         Status                     ---------                               -----------         ------                     Cell Count, CSF - Cerebr...[[id number]]  Abnormal            Final result               Spinal fluid differentia...[[id number]]                      Final result                 Please view results for these  tests on the individual orders.    Spinal fluid differential - Cerebrospinal Fluid, Lumbar Puncture [[id number]] Collected: 10/04/22 1201    Specimen: Cerebrospinal Fluid from Brain Updated: 10/04/22 1330     Lymphocytes, CSF 72 %      Monocytes, CSF 28 %     Cell Count, CSF - Cerebrospinal Fluid, Lumbar Puncture [[id number]]  (Abnormal) Collected: 10/04/22 1201    Specimen: Cerebrospinal Fluid from Brain Updated: 10/04/22 1252     Color, CSF Colorless     Supernatant Color, CSF Colorless     Appearance, CSF Clear     RBC, CSF 0 /mm3      Nucleated Cells, CSF 12 /mm3      Volume, CSF 7.5 mL      Tube Number, CSF 1     Method: Hemacytometer Method    Protein, CSF - Cerebrospinal Fluid, Brain [[id number]]  (Abnormal) Collected: 10/04/22 1201    Specimen: Cerebrospinal Fluid from Brain Updated: 10/04/22 1246     Protein, Total (CSF) 114.2 mg/dL     Glucose, CSF - Cerebrospinal Fluid, Brain [[id number]]  (Abnormal) Collected: 10/04/22 1201    Specimen: Cerebrospinal Fluid from Brain Updated: 10/04/22 1246     Glucose, CSF 94 mg/dL     VDRL, CSF - Cerebrospinal Fluid, Brain [[id number]] Collected: 10/04/22 1201    Specimen: Cerebrospinal Fluid from Brain Updated: 10/04/22 1213    POC Glucose Once [[id number]]  (Abnormal) Collected: 10/04/22 1038    Specimen: Blood Updated: 10/04/22 1050     Glucose 194 mg/dL      Comment: : [id number] Nuñez ToniMeter ID: [id number]           [id number]  ADITYA Cespedes      Electronically signed by Rahul Arnold APRN at 10/04/22 5656       Consult Notes (last 48 hours)  Notes from 10/03/22 1056 through 10/05/22 1056   No notes of this type exist for this encounter.

## 2022-10-05 NOTE — PLAN OF CARE
Goal Outcome Evaluation:  Plan of Care Reviewed With: patient           Outcome Evaluation: PT eval complete. Pt is AAOx4. Pt resides w/ his wife and states he is ind w/ all ADLs at [facility]. Pt uses a standard walker for all mobility. Today, pt required CGA for supine<>sit t/f w/ use of HRs and HOB elevated. Pt required CGA for sit<>stand t/fs. Pt amb 200 ft w/ CGA to Ady. He demos wide GUMARO and decereased gait speed. B LE MMT: 5/5. B LE ROM, sensation, coordination: WFL. Overall, pt demos decreased tolerance to activity, balance deficits, and SOB w/ activity. Pt is a falls risk. Skilled therapy services required to address these deficits. Anticipated d/c home w/ assist and HH.

## 2022-10-05 NOTE — PLAN OF CARE
Goal Outcome Evaluation:  Plan of Care Reviewed With: patient           Outcome Evaluation: OT eval completed. Pt presents alert and oriented x4, resting comfortably in bed. He reports at baseline being independent with all adls and mobility with use of rwx. Today he was able to bring self to sitting at EOB with CGA. Able to don socks while seated at EOB with SBA and vcs. CGA for sit <> stand t/f and all functional mobility. B UE strength 5/5, intact FMC/GMC and sensation. He would benefit from skilled OT services to assist with return to PLOF and safe d/c home. Recommend d/c home with assist and HH.

## 2022-10-05 NOTE — THERAPY EVALUATION
Patient Name: Elijah Escobar  : 1955    MRN: [id number]                              Today's Date: 10/5/2022       Admit Date: 10/4/2022    Visit Dx:     ICD-10-CM ICD-9-CM   1. S/P craniotomy  Z98.890 V45.89   2. Decreased activities of daily living (ADL)  Z78.9 V49.89   3. Impaired mobility  Z74.09 799.89     Patient Active Problem List   Diagnosis   • Meningioma s/p craniotomy   • Hypertension   • GERD (gastroesophageal reflux disease)   • Coronary artery disease   • Class 2 severe obesity due to excess calories with serious comorbidity and body mass index (BMI) of 38.0 to 38.9 in adult (HCC)   • Type 2 diabetes mellitus with hyperglycemia and peripheral neuropathy, with long-term current use of insulin (HCC)   • Leukocytosis   • Other specified anemias   • Paroxysmal atrial fibrillation with rapid ventricular response ([facility])   • Atrial fibrillation with RVR ([facility])   • Post-operative infection   • S/P craniotomy   • Smoker   • History of cranioplasty     Past Medical History:   Diagnosis Date   • Brain tumor (HCC)    • Coronary artery disease    • COVID-19 vaccine series completed     MODERNA X 3; LAST DOSE 3/2022   • Diabetes (HCC)    • Erectile dysfunction    • GERD (gastroesophageal reflux disease)    • Hypercholesteremia    • Hypertension      Past Surgical History:   Procedure Laterality Date   • BALLOON ANGIOPLASTY, ARTERY Right    • COLONOSCOPY     • CRANIECTOMY Right 2022    Procedure: CRANIECTOMY WITH INCISIONAL WASHOUT;  Surgeon: Felipe Banerjee MD;  Location:  [facility] OR;  Service: Neurosurgery;  Laterality: Right;   • CRANIOPLASTY Right 10/4/2022    Procedure: CRANIOPLASTY right with Lumbar drain;  Surgeon: Flaco Portillo MD;  Location:  [facility] OR;  Service: Neurosurgery;  Laterality: Right;   • CRANIOTOMY FOR TUMOR Right 2022    Procedure: CRANIOTOMY FOR TUMOR STERIOTACTIC WITH BRAIN LAB, right;  Surgeon: Flaco Portillo MD;  Location:  [facility] OR;  Service:  Neurosurgery;  Laterality: Right;      General Information     Row Name 10/05/22 0955          Physical Therapy Time and Intention    Document Type evaluation  -LH (r) MF (t) LH (c)     Mode of Treatment physical therapy  -LH (r) MF (t) LH (c)     Row Name 10/05/22 0955          General Information    Patient Profile Reviewed yes  Pt s/p cranioplasty R w/ lumbar drain on 10/4. PMH: S/p craniotomy 6/2021 for meningioma, craniectomy 7/1/22 due to infection. CAD, HTN. Pt presents w/ 2 recent amnesia episodes suggestive of seizures.  -LH (r) MF (t) LH (c)     Prior Level of Function independent:;all household mobility;transfer;bed mobility;ADL's  Pt uses a standard walker at home  -LH (r) MF (t) LH (c)     Existing Precautions/Restrictions fall  Helmet when OOB  -LH (r) MF (t) LH (c)     Barriers to Rehab medically complex  -LH (r) MF (t) LH (c)     Row Name 10/05/22 0955          Living Environment    People in Home spouse  -LH (r) MF (t) LH (c)     Row Name 10/05/22 0955          Home Main Entrance    Number of Stairs, Main Entrance two  -LH (r) MF (t) LH (c)     Stair Railings, Main Entrance none  -LH (r) MF (t) LH (c)     Row Name 10/05/22 0955          Stairs Within Home, Primary    Number of Stairs, Within Home, Primary none  -LH (r) MF (t) LH (c)     Stair Railings, Within Home, Primary none  -LH (r) MF (t) LH (c)     Row Name 10/05/22 0955          Cognition    Orientation Status (Cognition) oriented x 4  -LH (r) MF (t) LH (c)     Row Name 10/05/22 0955          Safety Issues, Functional Mobility    Impairments Affecting Function (Mobility) balance;shortness of breath;endurance/activity tolerance  -LH (r) MF (t) LH (c)           User Key  (r) = Recorded By, (t) = Taken By, (c) = Cosigned By    Initials Name Provider Type     Carlos Danielle, PT Physical Therapist    MF Heather Arevalo, PT Student PT Student               Mobility     Row Name 10/05/22 0955          Bed Mobility    Bed Mobility  supine-sit-supine  -LH (r) MF (t) LH (c)     Supine-Sit-Supine [place] (Bed Mobility) contact guard;1 person assist  -LH (r) MF (t) LH (c)     Assistive Device (Bed Mobility) bed rails;head of bed elevated  -LH (r) MF (t) LH (c)     Row Name 10/05/22 0955          Sit-Stand Transfer    Sit-Stand [place] (Transfers) contact guard;1 person assist  -LH (r) MF (t) LH (c)     Row Name 10/05/22 0955          Gait/Stairs (Locomotion)    Distance in Feet (Gait) 200  -LH (r) MF (t) LH (c)     Deviations/Abnormal Patterns (Gait) base of support, wide;gait speed decreased  -LH (r) MF (t) LH (c)           User Key  (r) = Recorded By, (t) = Taken By, (c) = Cosigned By    Initials Name Provider Type     Carlos Danielle, PT Physical Therapist    MF Heather Arevalo, PT Student PT Student               Obj/Interventions     Row Name 10/05/22 0955          Range of Motion Comprehensive    General Range of Motion bilateral lower extremity ROM WFL  -LH (r) MF (t) LH (c)     Row Name 10/05/22 0955          Strength Comprehensive (MMT)    Comment, General Manual Muscle Testing (MMT) Assessment B LE MMT: 5/5  -LH (r) MF (t) LH (c)     Row Name 10/05/22 0955          Motor Skills    Motor Skills coordination  -LH (r) MF (t) LH (c)     Coordination WNL;heel to shin  -LH (r) MF (t) LH (c)     Row Name 10/05/22 0955          Balance    Balance Assessment sitting dynamic balance;standing dynamic balance  -LH (r) MF (t) LH (c)     Dynamic Sitting Balance standby assist  -LH (r) MF (t) LH (c)     Position, Sitting Balance unsupported;sitting edge of bed  -LH (r) MF (t) LH (c)     Dynamic Standing Balance contact guard;minimal assist  -LH (r) MF (t) LH (c)     Position/Device Used, Standing Balance unsupported  -LH (r) MF (t) LH (c)     Row Name 10/05/22 0955          Sensory Assessment (Somatosensory)    Sensory Assessment (Somatosensory) LE sensation intact  -LH (r) MF (t) LH (c)           User Key  (r) = Recorded By, (t) =  Taken By, (c) = Cosigned By    Initials Name Provider Type     Carlos Danielle, PT Physical Therapist    MF Heather Arevalo, PT Student PT Student               Goals/Plan     Row Name 10/05/22 0955          Bed Mobility Goal 1 (PT)    Activity/Assistive Device (Bed Mobility Goal 1, PT) sit to supine/supine to sit  -LH (r) MF (t) LH (c)     [place] Level/Cues Needed (Bed Mobility Goal 1, PT) standby assist  -LH (r) MF (t) LH (c)     Time Frame (Bed Mobility Goal 1, PT) long term goal (LTG);10 days  -LH (r) MF (t) LH (c)     Progress/Outcomes (Bed Mobility Goal 1, PT) new goal  -LH (r) MF (t) LH (c)     Row Name 10/05/22 0955          Transfer Goal 1 (PT)    Activity/Assistive Device (Transfer Goal 1, PT) sit-to-stand/stand-to-sit;bed-to-chair/chair-to-bed  -LH (r) MF (t) LH (c)     [place] Level/Cues Needed (Transfer Goal 1, PT) standby assist  -LH (r) MF (t) LH (c)     Time Frame (Transfer Goal 1, PT) long term goal (LTG);10 days  -LH (r) MF (t) LH (c)     Progress/Outcome (Transfer Goal 1, PT) new goal  -LH (r) MF (t) LH (c)     Row Name 10/05/22 0955          Gait Training Goal 1 (PT)    Activity/Assistive Device (Gait Training Goal 1, PT) gait (walking locomotion);assistive device use;walker, standard;decrease fall risk;diminish gait deviation;improve balance and speed;increase endurance/gait distance  -LH (r) MF (t) LH (c)     [place] Level (Gait Training Goal 1, PT) standby assist  -LH (r) MF (t) LH (c)     Distance (Gait Training Goal 1, PT) 300  -LH (r) MF (t) LH (c)     Time Frame (Gait Training Goal 1, PT) long term goal (LTG);10 days  -LH (r) MF (t) LH (c)     Progress/Outcome (Gait Training Goal 1, PT) new goal  -LH (r) MF (t) LH (c)     Row Name 10/05/22 0955          Stairs Goal 1 (PT)    Activity/Assistive Device (Stairs Goal 1, PT) ascending stairs;descending stairs;decrease fall risk;improve balance and speed  -LH (r) MF (t) LH (c)     [place] Level/Cues Needed (Stairs Goal  1, PT) standby assist  -LH (r) MF (t) LH (c)     Number of Stairs (Stairs Goal 1, PT) 2  -LH (r) MF (t) LH (c)     Time Frame (Stairs Goal 1, PT) long term goal (LTG);10 days  -LH (r) MF (t) LH (c)     Progress/Outcome (Stairs Goal 1, PT) new goal  -LH (r) MF (t) LH (c)     Row Name 10/05/22 0955          Therapy Assessment/Plan (PT)    Planned Therapy Interventions (PT) balance training;bed mobility training;gait training;home exercise program;patient/family education;strengthening;stair training;transfer training  -LH (r) MF (t) LH (c)           User Key  (r) = Recorded By, (t) = Taken By, (c) = Cosigned By    Initials Name Provider Type     Carlos Danielle, PT Physical Therapist    MF Heather Arevalo, PT Student PT Student               Clinical Impression     Row Name 10/05/22 0955          Pain    Pretreatment Pain Rating 0/10 - no pain  -LH (r) MF (t) LH (c)     Posttreatment Pain Rating 0/10 - no pain  - (r) MF (t) LH (c)     Row Name 10/05/22 0955          Plan of Care Review    Plan of Care Reviewed With patient  -LH (r) MF (t) LH (c)     Outcome Evaluation PT eval complete. Pt is AAOx4. Pt resides w/ his wife and states he is ind w/ all ADLs at [facility]. Pt uses a standard walker for all mobility. Today, pt required CGA for supine<>sit t/f w/ use of HRs and HOB elevated. Pt required CGA for sit<>stand t/fs. Pt amb 200 ft w/ CGA to [facility]y. He demos wide GUMARO and decereased gait speed. B LE MMT: 5/5. B LE ROM, sensation, coordination: WFL. Overall, pt demos decreased tolerance to activity, balance deficits, and SOB w/ activity. Pt is a falls risk. Skilled therapy services required to address these deficits. Anticipated d/c home w/ assist and HH.  - (r) MF (t) LH (c)     Row Name 10/05/22 0955          Therapy Assessment/Plan (PT)    Patient/Family Therapy Goals Statement (PT) to go home  - (r) ROGER (t) NIC (c)     Rehab Potential (PT) good, to achieve stated therapy goals  - (r) ROGER (t) NIC (c)      Criteria for Skilled Interventions Met (PT) yes;meets criteria;skilled treatment is necessary  -LH (r) MF (t) LH (c)     Therapy Frequency (PT) 2 times/day  -LH (r) MF (t) LH (c)     Predicted Duration of Therapy Intervention (PT) until d/c  -LH (r) MF (t) LH (c)     Row Name 10/05/22 0955          Positioning and Restraints    Pre-Treatment Position in bed  -LH (r) MF (t) LH (c)     Post Treatment Position bed  -LH (r) MF (t) LH (c)     In Bed notified nsg;fowlers;call light within reach;encouraged to call for assist;patient within staff view;exit alarm on  -LH (r) MF (t) LH (c)           User Key  (r) = Recorded By, (t) = Taken By, (c) = Cosigned By    Initials Name Provider Type     Carlos Danielle, PT Physical Therapist    MF Heather Arevalo, PT Student PT Student               Outcome Measures     Row Name 10/05/22 0955          How much help from another person do you currently need...    Turning from your back to your side while in flat bed without using bedrails? 4  -LH (r) MF (t) LH (c)     Moving from lying on back to sitting on the side of a flat bed without bedrails? 3  -LH (r) MF (t) LH (c)     Moving to and from a bed to a chair (including a wheelchair)? 3  -LH (r) MF (t) LH (c)     Standing up from a chair using your arms (e.g., wheelchair, bedside chair)? 3  -LH (r) MF (t) LH (c)     Climbing 3-5 steps with a railing? 2  -LH (r) MF (t) LH (c)     To walk in hospital room? 3  -LH (r) MF (t) LH (c)     AM-PAC 6 Clicks Score (PT) 18  -LH (r) MF (t)     Highest level of mobility 6 --> Walked 10 steps or more  -LH (r) MF (t)     Row Name 10/05/22 0955 10/05/22 0954       Functional Assessment    Outcome Measure Options AM-PAC 6 Clicks Basic Mobility (PT)  -LH (r) MF (t) LH (c) AM-PAC 6 Clicks Daily Activity (OT)  -MENDEL          User Key  (r) = Recorded By, (t) = Taken By, (c) = Cosigned By    Initials Name Provider Type     Carlos Danielle, PT Physical Therapist    Coleen Martinez, OTR/L, CSRS  Occupational Therapist     Heather Arevalo, PT Student PT Student                             Physical Therapy Education                 Title: PT OT SLP Therapies (In Progress)     Topic: Physical Therapy (In Progress)     Point: Mobility training (Done)     Learning Progress Summary           Patient Acceptance, JACK, VU by  at 10/5/2022 0959    Comment: PT frequency, benefits of movement                   Point: Home exercise program (Not Started)     Learner Progress:  Not documented in this visit.          Point: Body mechanics (Not Started)     Learner Progress:  Not documented in this visit.          Point: Precautions (Not Started)     Learner Progress:  Not documented in this visit.                      User Key     Initials Effective Dates Name Provider Type Discipline     08/29/22 -  Heather Arevalo, PT Student PT Student PT              PT Recommendation and Plan  Planned Therapy Interventions (PT): balance training, bed mobility training, gait training, home exercise program, patient/family education, strengthening, stair training, transfer training  Plan of Care Reviewed With: patient  Outcome Evaluation: PT eval complete. Pt is AAOx4. Pt resides w/ his wife and states he is ind w/ all ADLs at [facility]. Pt uses a standard walker for all mobility. Today, pt required CGA for supine<>sit t/f w/ use of HRs and HOB elevated. Pt required CGA for sit<>stand t/fs. Pt amb 200 ft w/ CGA to [facility]. He demos wide GUMARO and decereased gait speed. B LE MMT: 5/5. B LE ROM, sensation, coordination: WFL. Overall, pt demos decreased tolerance to activity, balance deficits, and SOB w/ activity. Pt is a falls risk. Skilled therapy services required to address these deficits. Anticipated d/c home w/ assist and HH.     Time Calculation:    PT Charges     Row Name 10/05/22 0955             Time Calculation    Start Time 0955  -LH (r)  (t) LH (c)      Stop Time 1035  -LH (r)  (t) LH (c)      Time Calculation (min)  40 min  - (r) MF (t)      PT Received On 10/05/22  -LH (r) MF (t)  (c)      PT Goal Re-Cert Due Date 10/15/22  - (r) MF (t)  (c)            User Key  (r) = Recorded By, (t) = Taken By, (c) = Cosigned By    Initials Name Provider Type     Carlos Danielle, PT Physical Therapist    Heather Sparks, PT Student PT Student                  PT G-Codes  Outcome Measure Options: AM-PAC 6 Clicks Basic Mobility (PT)  AM-PAC 6 Clicks Score (PT): 18  AM-PAC 6 Clicks Score (OT): 21    Heather Negrete, PT Student  10/5/2022

## 2022-10-05 NOTE — PROGRESS NOTES
"Pharmacy Dosing Service  Pharmacokinetics  Vancomycin Follow-up Evaluation    Assessment/Action/Plan:  Active Hospital Problems    Diagnosis  POA   • **S/P craniotomy [Z98.890]  Not Applicable     Added automatically from request for surgery [id number]       • History of cranioplasty [Z98.890]  Not Applicable       Current Order: Vancomycin 1250 mg IVPB every 12 hours  Current end date:extended, current end time is now 10/9/22 2300    Levels/Previous evaluations: reviewed pre-op doses. Therapy extended    Other antimicrobials and/or additional factors considered in dosing methods: s/p Neurosurgical procedure    AUC Model Data:  Regimen: 1250 mg IV every 12 hours.  Exposure target: AUC24 (range)400-600 mg/L.hr   AUC24,ss: 517 mg/L.hr  PAUC*: 74 %  Ctrough,ss: 16.8 mg/L  Pconc*: 37 %  Tox.: 12 %      Plan: upon MAR review, patient may have received more than one load dose, and some vancomycin was given intra-op however it appears that was irrigation. Next dose of 1250 mg IV due at 1100. Will check a trough now to review regimen calculations, however it appears the regimen of 1250 mg IV Q12H is appropriate. Will update recommendations after the level results.     --UPDATE: trough 10.7 mcg/mL after essentially two doses (2x load/same time) and one 1250 mg dose. Expect additional accumulation. Continue this dose for today -- SKinsey 10/05/22 12:21 CDT     Clinical pharmacist following daily     Subjective:  Elijah Escobar is a 66 y.o. male currently on Vancomycin 1250 mg IV every 12 hours for the treatment of surgical prophylaxis, day 2 of  treatment.      Objective:  Ht: 177.8 cm (70\"); Wt: 115 kg (254 lb 3.2 oz)  Estimated Creatinine Clearance: 104.9 mL/min (by C-G formula based on SCr of 0.88 mg/dL).   Creatinine   Date Value Ref Range Status   10/05/2022 0.88 0.76 - 1.27 mg/dL Final      Lab Results   Component Value Date    WBC 14.28 (H) 10/05/2022         Culture Results:  Microbiology Results (last 10 days)       " Procedure Component Value - Date/Time    Culture, CSF - Cerebrospinal Fluid, Brain [[id number]] Collected: 10/04/22 1201    Lab Status: Preliminary result Specimen: Cerebrospinal Fluid from Brain Updated: 10/04/22 1414     Gram Stain Few (2+) WBCs seen      No organisms seen            Wagner Escobar, PharmD   10/05/22 08:43 CDT

## 2022-10-05 NOTE — PROGRESS NOTES
NEUROSURGERY DAILY PROGRESS NOTE    ASSESSMENT:   Elijah Escobar is a 66 y.o. with a significant comorbidity of coronary artery disease, diabetes, erectile dysfunction, GERD, hypertension, hyperlipidemia. He presents with a new problem of 2 episodes of intermittent amnesia suggestive of seizures. Physical exam findings of left pronator drift and dysmetria with left finger-nose otherwise neurologically intact.  His imaging shows avidly contrast-enhancing meningeal based mass with dural tails measuring 2.8 x 2.8 x 2.0 cm with surrounding FLAIR signal.    Past Medical History:   Diagnosis Date   • Brain tumor (HCC)    • Coronary artery disease    • COVID-19 vaccine series completed     MODERNA X 3; LAST DOSE 3/2022   • Diabetes (HCC)    • Erectile dysfunction    • GERD (gastroesophageal reflux disease)    • Hypercholesteremia    • Hypertension      Active Hospital Problems    Diagnosis    • **S/P craniotomy      Added automatically from request for surgery [id number]     • History of cranioplasty      PLAN:   Neuro: Stable.  Intact.   1 Day Post-Op (10/4/2022) cranioplasty   CSF unremarkable   Postop CT stable   JUAN was 60 mL   Continue neurochecks per policy.  Call for decline.   Continue Keppra   Transfer to floor    CV: No acute issues.  Continuous cardiac monitoring.  Cardene to keep SBP <140.  Pulm: Maintaining O2 sat.  Continuous pulse oximetry.  Incentive spirometry.   : Voiding spontaneously.  Bladder scans and I/O cath per policy.   FEN: Tolerating regular diet.    ENDO: Accu-Chek and treat per policy  GI: No acute issues.    ID: Given history of postoperative infection, continue prophylactic antibiotics.     Wound healing protocol  Heme:  DVT prophylaxis; SCD's  Pain: Tolerable at present with oral meds.     Dispo: PT/OT.       CHIEF COMPLAINT:   Postoperative infection    Subjective  Symptom stable.  Doing well    Temp:  [96.9 °F (36.1 °C)-98.2 °F (36.8 °C)] 98.1 °F (36.7 °C)  Heart Rate:  [56-81] 81  Resp:   "[12-16] 16  BP: (103-151)/() 123/59    Objective:  General Appearance:  Well-appearing and in no acute distress.    Vital signs: (most recent): Blood pressure 123/59, pulse 81, temperature 98.1 °F (36.7 °C), temperature source Oral, resp. rate 16, height 177.8 cm (70\"), weight 115 kg (254 lb 3.2 oz), SpO2 95 %.      Neurologic Exam     Mental Status   Oriented to person, place, and time.   Attention: normal. Concentration: normal.   Speech: speech is normal   Level of consciousness: alert    Bright and awake.  Oriented x3.  Follows commands without prompting showing thumbs up and 2 fingers bilaterally.     Cranial Nerves     CN II   Visual fields full to confrontation.     CN III, IV, VI   Pupils are equal, round, and reactive to light.  Extraocular motions are normal.     CN V   Facial sensation intact.     CN VII   Facial expression full, symmetric.     CN VIII   CN VIII normal.     CN IX, X   CN IX normal.     CN XI   CN XI normal.     Motor Exam   Right arm tone: normal  Left arm tone: normal  Right arm pronator drift: absent  Left arm pronator drift: absent  Right leg tone: normal  Left leg tone: normal    Strength   Right deltoid: 5/5  Left deltoid: 5/5  Right biceps: 5/5  Left biceps: 5/5  Right triceps: 5/5  Left triceps: 5/5  Right wrist extension: 5/5  Left wrist extension: 5/5  Right iliopsoas: 5/5  Left iliopsoas: 5/5  Right quadriceps: 5/5  Left quadriceps: 5/5  Right anterior tibial: 5/5  Left anterior tibial: 5/5  Right gastroc: 5/5  Left gastroc: 5/5  Right EHL 5/5  Left EHL 5/5       Sensory Exam   Right arm light touch: normal  Left arm light touch: normal  Right leg light touch: normal  Left leg light touch: normal    Gait, Coordination, and Reflexes     Tremor   Resting tremor: absent  Intention tremor: absent  Action tremor: absent    Drains:   Closed/Suction Drain 1 Right Scalp Bulb 7 Fr. (Active)       Output by Drain (mL) 10/04/22 0701 - 10/04/22 1900 10/04/22 1901 - 10/05/22 0700 " 10/05/22 0701 - 10/05/22 0836 Range Total   Closed/Suction Drain 1 Right Scalp Bulb 7 Fr. 30 30  60       Imaging Results (Last 24 Hours)     Procedure Component Value Units Date/Time    CT Head Without Contrast [[id number]] Collected: 10/04/22 1516     Updated: 10/04/22 1530    Narrative:      EXAMINATION: CT HEAD WO CONTRAST-      10/4/2022 3:00 PM CDT     HISTORY: Eval status post cranioplasty; Z98.890-Other specified  postprocedural states     In order to have a CT radiation dose as low as reasonably achievable  Automated Exposure Control was utilized for adjustment of the mA and/or  KV according to patient size.     DLP in mGycm= 845     The CT scan of the head is performed without intravenous contrast  enhancement.     Images are acquired in axial plane with subsequent reconstruction in  coronal and sagittal plane.     Comparison is made with the previous study dated 8/2/2022. Correlation  made with MR imaging of the brain dated 8/31/2022.     There is a low-density area involving the right frontal lobe which  appears to represent a combination of vasogenic edema and  encephalomalacia.     There is an extra-axial fluid accumulation in the right frontal lobe  temporal region which measures 3.2 x 0.9 cm. This have increased in the  left since the previous study.     There is a mild shift to the left, 1.7 mm, which was not seen in the  previous study.     There is a right frontal cranioplasty which is a change since the  previous study. There is small amount of fluid and air along the right  frontoparietal and temporal scalp. There is a subgaleal drain in place  in this area.     There is effacement of the right frontoparietal cortical sulci.     There is moderate asymmetrical ventricular dilatation, left more than  the right. This is similar to the previous study.     There is a left frontal subgaleal fluid and air which was not seen in  the previous study. This may be postsurgical changes.       Impression:       1. The intervertebral right frontal cranioplasty.  2. Right frontal vasogenic edema and encephalomalacia.  3. A 1.7 cm shift to the left which was not seen in the previous study.  4. A mild increase in right frontotemporal extra-axial fluid  accumulation adjacent and posterior to the cranioplasty.  5. Right frontoparietal and temporal subgaleal fluid and air and  subgaleal drain in place.  6. A new left frontal subgaleal fluid and air which may be postsurgical.                             This report was finalized on 10/04/2022 15:27 by Dr. Michelle Vigil MD.        Lab Results (last 24 hours)     Procedure Component Value Units Date/Time    Basic Metabolic Panel [[id number]]  (Abnormal) Collected: 10/05/22 0723    Specimen: Blood Updated: 10/05/22 0753     Glucose 329 mg/dL      BUN 21 mg/dL      Creatinine 0.88 mg/dL      Sodium 135 mmol/L      Potassium 4.9 mmol/L      Comment: Slight hemolysis detected by analyzer. Results may be affected.        Chloride 102 mmol/L      CO2 18.0 mmol/L      Calcium 8.2 mg/dL      BUN/Creatinine Ratio 23.9     Anion Gap 15.0 mmol/L      eGFR 94.8 mL/min/1.73      Comment: National Kidney Foundation and American Society of Nephrology (ASN) Task Force recommended calculation based on the Chronic Kidney Disease Epidemiology Collaboration (CKD-EPI) equation refit without adjustment for race.       Narrative:      GFR Normal >60  Chronic Kidney Disease <60  Kidney Failure <15      POC Glucose Once [[id number]]  (Abnormal) Collected: 10/05/22 0641    Specimen: Blood Updated: 10/05/22 0652     Glucose 308 mg/dL      Comment: : [id number] Debbie ElliottwellMeter ID: [id number]       CBC & Differential [[id number]]  (Abnormal) Collected: 10/05/22 0555    Specimen: Blood Updated: 10/05/22 0602    Narrative:      The following orders were created for panel order CBC & Differential.  Procedure                               Abnormality         Status                     ---------                                -----------         ------                     CBC Auto Differential[[id number]]        Abnormal            Final result                 Please view results for these tests on the individual orders.    CBC Auto Differential [[id number]]  (Abnormal) Collected: 10/05/22 0555    Specimen: Blood Updated: 10/05/22 0602     WBC 14.28 10*3/mm3      RBC 4.17 10*6/mm3      Hemoglobin 12.4 g/dL      Hematocrit 38.9 %      MCV 93.3 fL      MCH 29.7 pg      MCHC 31.9 g/dL      RDW 13.0 %      RDW-SD 44.3 fl      MPV 11.2 fL      Platelets 203 10*3/mm3      Neutrophil % 80.0 %      Lymphocyte % 13.9 %      Monocyte % 4.7 %      Eosinophil % 0.0 %      Basophil % 0.2 %      Immature Grans % 1.2 %      Neutrophils, Absolute 11.42 10*3/mm3      Lymphocytes, Absolute 1.99 10*3/mm3      Monocytes, Absolute 0.67 10*3/mm3      Eosinophils, Absolute 0.00 10*3/mm3      Basophils, Absolute 0.03 10*3/mm3      Immature Grans, Absolute 0.17 10*3/mm3      nRBC 0.0 /100 WBC     POC Glucose Once [[id number]]  (Abnormal) Collected: 10/04/22 1759    Specimen: Blood Updated: 10/04/22 1810     Glucose 258 mg/dL      Comment: : [email or web address] Quinten RyanMeter ID: [id number]       POC Glucose Once [[id number]]  (Abnormal) Collected: 10/04/22 1426    Specimen: Blood Updated: 10/04/22 1428     Glucose 190 mg/dL      Comment: : [id number] Kp GiraldouaMeter ID: [id number]       Culture, CSF - Cerebrospinal Fluid, Brain [[id number]] Collected: 10/04/22 1201    Specimen: Cerebrospinal Fluid from Brain Updated: 10/04/22 1414     Gram Stain Few (2+) WBCs seen      No organisms seen    Cell Count With Differential, CSF [[id number]]  (Abnormal) Collected: 10/04/22 1201    Specimen: Cerebrospinal Fluid from Brain Updated: 10/04/22 1330    Narrative:      The following orders were created for panel order Cell Count With Differential, CSF.  Procedure                               Abnormality         Status                     ---------                                -----------         ------                     Cell Count, CSF - Cerebr...[[id number]]  Abnormal            Final result               Spinal fluid differentia...[[id number]]                      Final result                 Please view results for these tests on the individual orders.    Spinal fluid differential - Cerebrospinal Fluid, Lumbar Puncture [[id number]] Collected: 10/04/22 1201    Specimen: Cerebrospinal Fluid from Brain Updated: 10/04/22 1330     Lymphocytes, CSF 72 %      Monocytes, CSF 28 %     Cell Count, CSF - Cerebrospinal Fluid, Lumbar Puncture [[id number]]  (Abnormal) Collected: 10/04/22 1201    Specimen: Cerebrospinal Fluid from Brain Updated: 10/04/22 1252     Color, CSF Colorless     Supernatant Color, CSF Colorless     Appearance, CSF Clear     RBC, CSF 0 /mm3      Nucleated Cells, CSF 12 /mm3      Volume, CSF 7.5 mL      Tube Number, CSF 1     Method: Hemacytometer Method    Protein, CSF - Cerebrospinal Fluid, Brain [[id number]]  (Abnormal) Collected: 10/04/22 1201    Specimen: Cerebrospinal Fluid from Brain Updated: 10/04/22 1246     Protein, Total (CSF) 114.2 mg/dL     Glucose, CSF - Cerebrospinal Fluid, Brain [[id number]]  (Abnormal) Collected: 10/04/22 1201    Specimen: Cerebrospinal Fluid from Brain Updated: 10/04/22 1246     Glucose, CSF 94 mg/dL     VDRL, CSF - Cerebrospinal Fluid, Brain [[id number]] Collected: 10/04/22 1201    Specimen: Cerebrospinal Fluid from Brain Updated: 10/04/22 1213    POC Glucose Once [[id number]]  (Abnormal) Collected: 10/04/22 1038    Specimen: Blood Updated: 10/04/22 1050     Glucose 194 mg/dL      Comment: : [id number] Nuñez ToniMeter ID: [id number]           [id number]  Rahul Arnold, APRN

## 2022-10-06 LAB
ANION GAP SERPL CALCULATED.3IONS-SCNC: 12 MMOL/L (ref 5–15)
BASOPHILS # BLD AUTO: 0.03 10*3/MM3 (ref 0–0.2)
BASOPHILS NFR BLD AUTO: 0.2 % (ref 0–1.5)
BUN SERPL-MCNC: 17 MG/DL (ref 8–23)
BUN/CREAT SERPL: 21.3 (ref 7–25)
CALCIUM SPEC-SCNC: 8.5 MG/DL (ref 8.6–10.5)
CHLORIDE SERPL-SCNC: 104 MMOL/L (ref 98–107)
CO2 SERPL-SCNC: 22 MMOL/L (ref 22–29)
CREAT SERPL-MCNC: 0.8 MG/DL (ref 0.76–1.27)
DEPRECATED RDW RBC AUTO: 45.4 FL (ref 37–54)
EGFRCR SERPLBLD CKD-EPI 2021: 97.6 ML/MIN/1.73
EOSINOPHIL # BLD AUTO: 0.01 10*3/MM3 (ref 0–0.4)
EOSINOPHIL NFR BLD AUTO: 0.1 % (ref 0.3–6.2)
ERYTHROCYTE [DISTWIDTH] IN BLOOD BY AUTOMATED COUNT: 13.2 % (ref 12.3–15.4)
GLUCOSE BLDC GLUCOMTR-MCNC: 252 MG/DL (ref 70–130)
GLUCOSE BLDC GLUCOMTR-MCNC: 257 MG/DL (ref 70–130)
GLUCOSE BLDC GLUCOMTR-MCNC: 359 MG/DL (ref 70–130)
GLUCOSE BLDC GLUCOMTR-MCNC: 372 MG/DL (ref 70–130)
GLUCOSE SERPL-MCNC: 301 MG/DL (ref 65–99)
HBA1C MFR BLD: 11 % (ref 4.8–5.6)
HCT VFR BLD AUTO: 37.5 % (ref 37.5–51)
HGB BLD-MCNC: 11.9 G/DL (ref 13–17.7)
IMM GRANULOCYTES # BLD AUTO: 0.07 10*3/MM3 (ref 0–0.05)
IMM GRANULOCYTES NFR BLD AUTO: 0.5 % (ref 0–0.5)
LYMPHOCYTES # BLD AUTO: 2.55 10*3/MM3 (ref 0.7–3.1)
LYMPHOCYTES NFR BLD AUTO: 19.4 % (ref 19.6–45.3)
MCH RBC QN AUTO: 29.8 PG (ref 26.6–33)
MCHC RBC AUTO-ENTMCNC: 31.7 G/DL (ref 31.5–35.7)
MCV RBC AUTO: 93.8 FL (ref 79–97)
MONOCYTES # BLD AUTO: 0.89 10*3/MM3 (ref 0.1–0.9)
MONOCYTES NFR BLD AUTO: 6.8 % (ref 5–12)
NEUTROPHILS NFR BLD AUTO: 73 % (ref 42.7–76)
NEUTROPHILS NFR BLD AUTO: 9.62 10*3/MM3 (ref 1.7–7)
NRBC BLD AUTO-RTO: 0 /100 WBC (ref 0–0.2)
PLATELET # BLD AUTO: 174 10*3/MM3 (ref 140–450)
PMV BLD AUTO: 11.3 FL (ref 6–12)
POTASSIUM SERPL-SCNC: 4.1 MMOL/L (ref 3.5–5.2)
RBC # BLD AUTO: 4 10*6/MM3 (ref 4.14–5.8)
REAGIN AB CSF QL: NON REACTIVE
SODIUM SERPL-SCNC: 138 MMOL/L (ref 136–145)
WBC NRBC COR # BLD: 13.17 10*3/MM3 (ref 3.4–10.8)

## 2022-10-06 PROCEDURE — 97116 GAIT TRAINING THERAPY: CPT

## 2022-10-06 PROCEDURE — 80048 BASIC METABOLIC PNL TOTAL CA: CPT | Performed by: NURSE PRACTITIONER

## 2022-10-06 PROCEDURE — 25010000002 VANCOMYCIN 10 G RECONSTITUTED SOLUTION: Performed by: NURSE PRACTITIONER

## 2022-10-06 PROCEDURE — 85025 COMPLETE CBC W/AUTO DIFF WBC: CPT | Performed by: NURSE PRACTITIONER

## 2022-10-06 PROCEDURE — 82962 GLUCOSE BLOOD TEST: CPT

## 2022-10-06 PROCEDURE — 25010000002 HEPARIN (PORCINE) PER 1000 UNITS: Performed by: NURSE PRACTITIONER

## 2022-10-06 PROCEDURE — 83036 HEMOGLOBIN GLYCOSYLATED A1C: CPT | Performed by: NURSE PRACTITIONER

## 2022-10-06 PROCEDURE — 97535 SELF CARE MNGMENT TRAINING: CPT

## 2022-10-06 PROCEDURE — 99222 1ST HOSP IP/OBS MODERATE 55: CPT | Performed by: EMERGENCY MEDICINE

## 2022-10-06 PROCEDURE — 99024 POSTOP FOLLOW-UP VISIT: CPT | Performed by: NURSE PRACTITIONER

## 2022-10-06 PROCEDURE — 97110 THERAPEUTIC EXERCISES: CPT

## 2022-10-06 PROCEDURE — 63710000001 INSULIN LISPRO (HUMAN) PER 5 UNITS: Performed by: NURSE PRACTITIONER

## 2022-10-06 PROCEDURE — 63710000001 INSULIN DETEMIR PER 5 UNITS: Performed by: NURSE PRACTITIONER

## 2022-10-06 RX ORDER — METOPROLOL TARTRATE 100 MG/1
100 TABLET ORAL EVERY 12 HOURS SCHEDULED
Status: DISCONTINUED | OUTPATIENT
Start: 2022-10-06 | End: 2022-10-07 | Stop reason: HOSPADM

## 2022-10-06 RX ADMIN — INSULIN LISPRO 12 UNITS: 100 INJECTION, SOLUTION INTRAVENOUS; SUBCUTANEOUS at 09:30

## 2022-10-06 RX ADMIN — OXYCODONE AND ACETAMINOPHEN 1 TABLET: 325; 10 TABLET ORAL at 23:07

## 2022-10-06 RX ADMIN — ASPIRIN 81 MG: 81 TABLET, CHEWABLE ORAL at 09:29

## 2022-10-06 RX ADMIN — ZINC SULFATE 220 MG (50 MG) CAPSULE 220 MG: CAPSULE at 09:29

## 2022-10-06 RX ADMIN — ISOSORBIDE MONONITRATE 60 MG: 60 TABLET, EXTENDED RELEASE ORAL at 09:29

## 2022-10-06 RX ADMIN — ATORVASTATIN CALCIUM 20 MG: 10 TABLET, FILM COATED ORAL at 22:45

## 2022-10-06 RX ADMIN — INSULIN DETEMIR 30 UNITS: 100 INJECTION, SOLUTION SUBCUTANEOUS at 22:45

## 2022-10-06 RX ADMIN — METOPROLOL TARTRATE 100 MG: 100 TABLET ORAL at 09:29

## 2022-10-06 RX ADMIN — INSULIN LISPRO 8 UNITS: 100 INJECTION, SOLUTION INTRAVENOUS; SUBCUTANEOUS at 11:57

## 2022-10-06 RX ADMIN — INSULIN LISPRO 8 UNITS: 100 INJECTION, SOLUTION INTRAVENOUS; SUBCUTANEOUS at 17:00

## 2022-10-06 RX ADMIN — LEVETIRACETAM 500 MG: 500 TABLET, FILM COATED ORAL at 09:29

## 2022-10-06 RX ADMIN — NICOTINE 1 PATCH: 14 PATCH, EXTENDED RELEASE TRANSDERMAL at 17:01

## 2022-10-06 RX ADMIN — DOCUSATE SODIUM 50 MG AND SENNOSIDES 8.6 MG 2 TABLET: 8.6; 5 TABLET, FILM COATED ORAL at 09:29

## 2022-10-06 RX ADMIN — PANTOPRAZOLE SODIUM 40 MG: 40 TABLET, DELAYED RELEASE ORAL at 06:03

## 2022-10-06 RX ADMIN — HEPARIN SODIUM 5000 UNITS: 5000 INJECTION, SOLUTION INTRAVENOUS; SUBCUTANEOUS at 09:30

## 2022-10-06 RX ADMIN — LEVETIRACETAM 500 MG: 500 TABLET, FILM COATED ORAL at 22:46

## 2022-10-06 RX ADMIN — AMLODIPINE BESYLATE 10 MG: 10 TABLET ORAL at 09:29

## 2022-10-06 RX ADMIN — OXYCODONE HYDROCHLORIDE AND ACETAMINOPHEN 1 TABLET: 7.5; 325 TABLET ORAL at 17:01

## 2022-10-06 RX ADMIN — HEPARIN SODIUM 5000 UNITS: 5000 INJECTION, SOLUTION INTRAVENOUS; SUBCUTANEOUS at 22:46

## 2022-10-06 RX ADMIN — DOCUSATE SODIUM 50 MG AND SENNOSIDES 8.6 MG 2 TABLET: 8.6; 5 TABLET, FILM COATED ORAL at 22:45

## 2022-10-06 RX ADMIN — OXYCODONE HYDROCHLORIDE AND ACETAMINOPHEN 500 MG: 500 TABLET ORAL at 09:29

## 2022-10-06 RX ADMIN — GABAPENTIN 300 MG: 300 CAPSULE ORAL at 17:01

## 2022-10-06 RX ADMIN — METOPROLOL TARTRATE 100 MG: 100 TABLET ORAL at 22:54

## 2022-10-06 RX ADMIN — Medication 24000 UNITS: at 09:30

## 2022-10-06 RX ADMIN — LISINOPRIL 20 MG: 20 TABLET ORAL at 09:29

## 2022-10-06 RX ADMIN — GABAPENTIN 300 MG: 300 CAPSULE ORAL at 09:29

## 2022-10-06 RX ADMIN — VANCOMYCIN HYDROCHLORIDE 1250 MG: 10 INJECTION, POWDER, LYOPHILIZED, FOR SOLUTION INTRAVENOUS at 11:29

## 2022-10-06 RX ADMIN — VANCOMYCIN HYDROCHLORIDE 1250 MG: 10 INJECTION, POWDER, LYOPHILIZED, FOR SOLUTION INTRAVENOUS at 22:45

## 2022-10-06 RX ADMIN — OXYCODONE HYDROCHLORIDE AND ACETAMINOPHEN 1 TABLET: 7.5; 325 TABLET ORAL at 09:52

## 2022-10-06 RX ADMIN — GABAPENTIN 300 MG: 300 CAPSULE ORAL at 22:46

## 2022-10-06 NOTE — PLAN OF CARE
Goal Outcome Evaluation:  Plan of Care Reviewed With: patient        Progress: improving  Outcome Evaluation: OT tx completed on this date. Pt A&O x4. There was some question on a helmet for OOB activities, however, RN reports at this time, no helmet has been implemented. Pt on 2.5L/NC. Pt completed short distances of functional mobility in room from bed<>bathroom with RW requiring CGA. Pt completed toileting tasks in bathroom requiring CGA for transfer and SBA for all other aspects. Pt completed grooming tasks of washing hands/face and oral regimen requiring CGA. Pt completed bathing tasks at sink level while seated on shower bench requiring SBA for seated aspects, CGA/SBA for standing aspects. Pt is impulsive and demonstrates poor safety awareness requiring min verbal cuing for all activities. OT POC to continue.

## 2022-10-06 NOTE — THERAPY TREATMENT NOTE
Acute Care - Physical Therapy Treatment Note  [facility]     Patient Name: Elijah Escobar  : 1955  MRN: [id number]  Today's Date: 10/6/2022      Visit Dx:     ICD-10-CM ICD-9-CM   1. S/P craniotomy  Z98.890 V45.89   2. Decreased activities of daily living (ADL)  Z78.9 V49.89   3. Impaired mobility  Z74.09 799.89     Patient Active Problem List   Diagnosis   • Meningioma s/p craniotomy   • Hypertension   • GERD (gastroesophageal reflux disease)   • Coronary artery disease   • Class 2 severe obesity due to excess calories with serious comorbidity and body mass index (BMI) of 38.0 to 38.9 in adult (HCC)   • Type 2 diabetes mellitus with hyperglycemia and peripheral neuropathy, with long-term current use of insulin ([facility])   • Leukocytosis   • Other specified anemias   • Paroxysmal atrial fibrillation with rapid ventricular response ([facility])   • Atrial fibrillation with RVR ([facility])   • Post-operative infection   • S/P craniotomy   • Smoker   • History of cranioplasty     Past Medical History:   Diagnosis Date   • Brain tumor (HCC)    • Coronary artery disease    • COVID-19 vaccine series completed     MODERNA X 3; LAST DOSE 3/2022   • Diabetes (HCC)    • Erectile dysfunction    • GERD (gastroesophageal reflux disease)    • Hypercholesteremia    • Hypertension      Past Surgical History:   Procedure Laterality Date   • BALLOON ANGIOPLASTY, ARTERY Right    • COLONOSCOPY     • CRANIECTOMY Right 2022    Procedure: CRANIECTOMY WITH INCISIONAL WASHOUT;  Surgeon: Felipe Banerjee MD;  Location: [facility] OR;  Service: Neurosurgery;  Laterality: Right;   • CRANIOPLASTY Right 10/4/2022    Procedure: CRANIOPLASTY right with Lumbar drain;  Surgeon: Flaco Portillo MD;  Location:  [facility] OR;  Service: Neurosurgery;  Laterality: Right;   • CRANIOTOMY FOR TUMOR Right 2022    Procedure: CRANIOTOMY FOR TUMOR STERIOTACTIC WITH BRAIN LAB, right;  Surgeon: Flaco Portillo MD;  Location:  [facility] OR;  Service:  Neurosurgery;  Laterality: Right;     PT Assessment (last 12 hours)     PT Evaluation and Treatment     Row Name 10/06/22 1009          Physical Therapy Time and Intention    Subjective Information complains of;weakness;fatigue;pain (P)   -KW     Document Type therapy note (daily note) (P)   -KW     Mode of Treatment physical therapy (P)   -KW     Row Name 10/06/22 1009          General Information    Existing Precautions/Restrictions fall (P)   -KW     Row Name 10/06/22 1009          Pain    Pretreatment Pain Rating 10/10 (P)   -KW     Posttreatment Pain Rating 10/10 (P)   -KW     Pain Location - Side/Orientation Bilateral (P)   -KW     Pain Location lower (P)   -KW     Pain Location - extremity (P)   -KW     Pain Intervention(s) Ambulation/increased activity;Medication (See MAR) (P)   -KW     Row Name 10/06/22 1009          Bed Mobility    Bed Mobility supine-sit-supine (P)   -KW     Supine-Sit [place] (Bed Mobility) standby assist (P)   -KW     Supine-Sit-Supine [place] (Bed Mobility) standby assist (P)   -KW     Assistive Device (Bed Mobility) bed rails;head of bed elevated (P)   -KW     Row Name 10/06/22 1009          Transfers    Sit-Stand [place] (Transfers) contact guard;verbal cues (P)   -KW     Stand-Sit [place] (Transfers) contact guard;verbal cues (P)   -KW     Row Name 10/06/22 1009          Sit-Stand Transfer    Assistive Device (Sit-Stand Transfers) walker, front-wheeled (P)   -KW     Row Name 10/06/22 1009          Stand-Sit Transfer    Assistive Device (Stand-Sit Transfers) walker, front-wheeled (P)   -KW     Row Name 10/06/22 1009          Gait/Stairs (Locomotion)    [place] Level (Gait) verbal cues;contact guard (P)   -KW     Assistive Device (Gait) walker, front-wheeled (P)   -KW     Distance in Feet (Gait) 250', 275' once seated rest break in between (P)   -KW     Deviations/Abnormal Patterns (Gait) stride length decreased (P)   tends to lean to L when fatigue, walks  fast pace at times  -KW     Comment, (Gait/Stairs) patient presents impulsive at times during ambulation (P)   -KW     Row Name 10/06/22 1009          Motor Skills    Comments, Therapeutic Exercise seated ankle pumps, LAQ, hip flexion x15; sit to stand from level bed x10 (P)   -KW     Row Name             Wound 06/16/22 0951 Right anterior scalp Incision    Wound - Properties Group Placement Date: 06/16/22  -CHRISTIANO Placement Time: 0951  -CHRISTIANO Side: Right  -CHRISTIANO Orientation: anterior  -CHRISTIANO Location: scalp  -CHRISTIANO Primary Wound Type: Incision  -CHRISTIANO     Retired Wound - Properties Group Placement Date: 06/16/22  -CHRISTIANO Placement Time: 0951  -CHRISTIANO Side: Right  -CHRISTIANO Orientation: anterior  -CHRISTIANO Location: scalp  -CHRISTIANO Primary Wound Type: Incision  -CHRISTIANO     Retired Wound - Properties Group Date first assessed: 06/16/22  -CHRISTIANO Time first assessed: 0951  -CHRISTIANO Side: Right  -CHRISTIANO Location: scalp  -CHRISTIANO Primary Wound Type: Incision  -CHRISTIANO     Row Name             Wound 10/04/22 1242 Bilateral scalp Incision    Wound - Properties Group Placement Date: 10/04/22  -CB Placement Time: 1242  -CB Present on Hospital Admission: N  -CB Side: Bilateral  -CB Location: scalp  -CB Primary Wound Type: Incision  -CB     Retired Wound - Properties Group Placement Date: 10/04/22  -CB Placement Time: 1242  -CB Present on Hospital Admission: N  -CB Side: Bilateral  -CB Location: scalp  -CB Primary Wound Type: Incision  -CB     Retired Wound - Properties Group Date first assessed: 10/04/22  -CB Time first assessed: 1242  -CB Present on Hospital Admission: N  -CB Side: Bilateral  -CB Location: scalp  -CB Primary Wound Type: Incision  -CB     Row Name 10/06/22 1009          Plan of Care Review    Plan of Care Reviewed With patient (P)   -KW     Progress no change (P)   -KW     Outcome Evaluation Patient in bed upon entering room. Patient c/o pain of BLE rating pain 10/10. Patient able to ambulate 250' contact guard assist requiring verbal cueing for safety with assistive device and  posture. Patient required one seated rest break before ambulating 275'. Patient performed seated exercises and sit to stand EOB. Patient will benefit from balance exercises and safety education. (P)   -KW     Row Name 10/06/22 1009          Positioning and Restraints    Pre-Treatment Position in bed (P)   -KW     Post Treatment Position bed (P)   -KW     In Bed call light within reach;fowlers;encouraged to call for assist;exit alarm on;SCD pump applied (P)   -KW           User Key  (r) = Recorded By, (t) = Taken By, (c) = Cosigned By    Initials Name Provider Type    Ricky George RN Registered Nurse    Adrian Mosquera RN Registered Nurse    Holly Palumbo, [place] Student PTA Student                Physical Therapy Education                 Title: PT OT SLP Therapies (In Progress)     Topic: Physical Therapy (In Progress)     Point: Mobility training (In Progress)     Learning Progress Summary           Patient Acceptance, E, NR by JO at 10/6/2022 1055    Comment: safety with assistive device, posture    Acceptance, E, VU by  at 10/5/2022 0955    Comment: PT frequency, benefits of movement                   Point: Home exercise program (Not Started)     Learner Progress:  Not documented in this visit.          Point: Body mechanics (Not Started)     Learner Progress:  Not documented in this visit.          Point: Precautions (Not Started)     Learner Progress:  Not documented in this visit.                      User Key     Initials Effective Dates Name Provider Type Discipline     08/29/22 -  Heather Arevalo, PT Student PT Student PT    JO 09/12/22 -  Holly Salas PTA Student PTA Student PT              PT Recommendation and Plan     Plan of Care Reviewed With: (P) patient  Progress: (P) no change  Outcome Evaluation: (P) Patient in bed upon entering room. Patient c/o pain of BLE rating pain 10/10. Patient able to ambulate 250' contact guard assist requiring verbal cueing for  safety with assistive device and posture. Patient required one seated rest break before ambulating 275'. Patient performed seated exercises and sit to stand EOB. Patient will benefit from balance exercises and safety education.   Outcome Measures     Row Name 10/06/22 1000             How much help from another person do you currently need...    Turning from your back to your side while in flat bed without using bedrails? 4 (P)   -KW      Moving from lying on back to sitting on the side of a flat bed without bedrails? 3 (P)   -KW      Moving to and from a bed to a chair (including a wheelchair)? 3 (P)   -KW      Standing up from a chair using your arms (e.g., wheelchair, bedside chair)? 3 (P)   -KW      Climbing 3-5 steps with a railing? 3 (P)   -KW      To walk in hospital room? 3 (P)   -KW      AM-PAC 6 Clicks Score (PT) 19 (P)   -KW            User Key  (r) = Recorded By, (t) = Taken By, (c) = Cosigned By    Initials Name Provider Type    Holly Palumbo PTA Student PTA Student                 Time Calculation:    PT Charges     Row Name 10/06/22 1103             Time Calculation    Start Time 1009  -MF      Stop Time 1038  -MF      Time Calculation (min) 29 min  -      PT Received On 10/06/22  -MF              Time Calculation- PT    Total Timed Code Minutes- PT 29 minute(s)  -MF              Timed Charges    74871 - PT Therapeutic Exercise Minutes 10  -MF      96859 - Gait Training Minutes  19  -MF              Total Minutes    Timed Charges Total Minutes 29  -MF       Total Minutes 29  -MF            User Key  (r) = Recorded By, (t) = Taken By, (c) = Cosigned By    Initials Name Provider Type    Zenia Pandya PTA Physical Therapist Assistant                  PT G-Codes  Outcome Measure Options: AM-PAC 6 Clicks Basic Mobility (PT)  AM-PAC 6 Clicks Score (PT): (P) 19  AM-PAC 6 Clicks Score (OT): 21    Holly Salas PTA Student  10/6/2022

## 2022-10-06 NOTE — THERAPY TREATMENT NOTE
Acute Care - Occupational Therapy Treatment Note  [facility]     Patient Name: Elijah sEcobar  : 1955  MRN: [id number]  Today's Date: 10/6/2022             Admit Date: 10/4/2022       ICD-10-CM ICD-9-CM   1. S/P craniotomy  Z98.890 V45.89   2. Decreased activities of daily living (ADL)  Z78.9 V49.89   3. Impaired mobility  Z74.09 799.89     Patient Active Problem List   Diagnosis   • Meningioma s/p craniotomy   • Hypertension   • GERD (gastroesophageal reflux disease)   • Coronary artery disease   • Class 2 severe obesity due to excess calories with serious comorbidity and body mass index (BMI) of 38.0 to 38.9 in adult (Formerly [facility])   • Type 2 diabetes mellitus with hyperglycemia and peripheral neuropathy, with long-term current use of insulin (Formerly [facility])   • Leukocytosis   • Other specified anemias   • Paroxysmal atrial fibrillation with rapid ventricular response (Formerly [facility])   • Atrial fibrillation with RVR (Formerly [facility])   • Post-operative infection   • S/P craniotomy   • Smoker   • History of cranioplasty     Past Medical History:   Diagnosis Date   • Brain tumor (HCC)    • Coronary artery disease    • COVID-19 vaccine series completed     MODERNA X 3; LAST DOSE 3/2022   • Diabetes (Formerly [facility])    • Erectile dysfunction    • GERD (gastroesophageal reflux disease)    • Hypercholesteremia    • Hypertension      Past Surgical History:   Procedure Laterality Date   • BALLOON ANGIOPLASTY, ARTERY Right    • COLONOSCOPY     • CRANIECTOMY Right 2022    Procedure: CRANIECTOMY WITH INCISIONAL WASHOUT;  Surgeon: Felipe Banerjee MD;  Location: [facility] OR;  Service: Neurosurgery;  Laterality: Right;   • CRANIOPLASTY Right 10/4/2022    Procedure: CRANIOPLASTY right with Lumbar drain;  Surgeon: Flaco Portillo MD;  Location: [facility] OR;  Service: Neurosurgery;  Laterality: Right;   • CRANIOTOMY FOR TUMOR Right 2022    Procedure: CRANIOTOMY FOR TUMOR STERIOTACTIC WITH BRAIN LAB, right;  Surgeon: Flaco Portillo MD;  Location:   [facility] OR;  Service: Neurosurgery;  Laterality: Right;         OT ASSESSMENT FLOWSHEET (last 12 hours)     OT Evaluation and Treatment     Row Name 10/06/22 1420                   OT Time and Intention    Subjective Information complains of;pain  -LS        Document Type therapy note (daily note)  -LS        Mode of Treatment occupational therapy  -LS        Patient Effort good  -LS                  General Information    Patient Profile Reviewed yes  -LS        Existing Precautions/Restrictions fall  -LS        Barriers to Rehab medically complex  -LS                  Pain Assessment    Pretreatment Pain Rating 9/10  -LS        Posttreatment Pain Rating 9/10  -LS        Pain Location lower  -LS        Pain Location - head;extremity  -LS        Pain Intervention(s) Ambulation/increased activity  -LS                  Cognition    Orientation Status (Cognition) oriented x 4  -LS                  Wound 06/16/22 0951 Right anterior scalp Incision    Wound - Properties Group Placement Date: 06/16/22  -CHRISTIANO Placement Time: 0951  -CHRISTIANO Side: Right  -CHRISTIANO Orientation: anterior  -CHRISTIANO Location: scalp  -CHRISTIANO Primary Wound Type: Incision  -CHRISTIANO        Retired Wound - Properties Group Placement Date: 06/16/22  -CHRISTIANO Placement Time: 0951  -CHRISTIANO Side: Right  -CHRISTIANO Orientation: anterior  -CHRISTIANO Location: scalp  -CHRISTIANO Primary Wound Type: Incision  -CHRISTIANO        Retired Wound - Properties Group Date first assessed: 06/16/22  -CHRISTIANO Time first assessed: 0951  -CHRISTIANO Side: Right  -CHRISTIANO Location: scalp  -CHRISTIANO Primary Wound Type: Incision  -CHRISTIANO                  Wound 10/04/22 1242 Bilateral scalp Incision    Wound - Properties Group Placement Date: 10/04/22  -CB Placement Time: 1242  -CB Present on Hospital Admission: N  -CB Side: Bilateral  -CB Location: scalp  -CB Primary Wound Type: Incision  -CB        Retired Wound - Properties Group Placement Date: 10/04/22  -CB Placement Time: 1242  -CB Present on Hospital Admission: N  -CB Side: Bilateral  -CB Location: scalp  -CB  Primary Wound Type: Incision  -CB        Retired Wound - Properties Group Date first assessed: 10/04/22  -CB Time first assessed: 1242  -CB Present on Hospital Admission: N  -CB Side: Bilateral  -CB Location: scalp  -CB Primary Wound Type: Incision  -CB                  Coping    Observed Emotional State calm;cooperative  -LS        Verbalized Emotional State acceptance  -LS                  Plan of Care Review    Plan of Care Reviewed With patient  -LS        Progress improving  -LS        Outcome Evaluation OT tx completed on this date. Pt A&O x4. There was some question on a helmet for OOB activities, however, RN reports at this time, no helmet has been implemented. Pt on 2.5L/NC. Pt completed short distances of functional mobility in room from bed<>bathroom with RW requiring CGA. Pt completed toileting tasks in bathroom requiring CGA for transfer and SBA for all other aspects. Pt completed grooming tasks of washing hands/face and oral regimen requiring CGA. Pt completed bathing tasks at sink level while seated on shower bench requiring SBA for seated aspects, CGA/SBA for standing aspects. Pt is impulsive and demonstrates poor safety awareness requiring min verbal cuing for all activities. OT POC to continue.  -LS                  Vital Signs    Intra Patient Position Supine  -LS        Post Patient Position Standing  -LS                  Positioning and Restraints    Pre-Treatment Position in bed  -LS        Post Treatment Position bed  -LS        In Bed call light within reach;encouraged to call for assist;exit alarm on;ramos  -LS                  Therapy Plan Review/Discharge Plan (OT)    Anticipated Discharge Disposition (OT) home with home health  -LS              User Key  (r) = Recorded By, (t) = Taken By, (c) = Cosigned By    Initials Name Effective Dates    Ricky George RN 02/17/22 -     CB Adrian Petty RN 04/28/22 -     LS Lora Elmore COTA 09/22/22 -                  Occupational Therapy  Education                 Title: PT OT SLP Therapies (In Progress)     Topic: Occupational Therapy (In Progress)     Point: ADL training (Done)     Description:   Instruct learner(s) on proper safety adaptation and remediation techniques during self care or transfers.   Instruct in proper use of assistive devices.              Learning Progress Summary           Patient Acceptance, E,D, VU by  at 10/6/2022 1535    Comment: Pt educated on safey with sit to stand to reach back during descention for safety.    Acceptance, E, VU by MENDEL at 10/5/2022 1035                   Point: Home exercise program (Not Started)     Description:   Instruct learner(s) on appropriate technique for monitoring, assisting and/or progressing therapeutic exercises/activities.              Learner Progress:  Not documented in this visit.          Point: Precautions (Done)     Description:   Instruct learner(s) on prescribed precautions during self-care and functional transfers.              Learning Progress Summary           Patient Acceptance, E, VU by MENDEL at 10/5/2022 1035                   Point: Body mechanics (Not Started)     Description:   Instruct learner(s) on proper positioning and spine alignment during self-care, functional mobility activities and/or exercises.              Learner Progress:  Not documented in this visit.                      User Key     Initials Effective Dates Name Provider Type Discipline     11/10/21 -  Coleen Andres OTR/L, ALEISHA Occupational Therapist OT     09/22/22 -  Lora Elmore COTA Occupational Therapist Assistant THERAPIES                  OT Recommendation and Plan     Plan of Care Review  Plan of Care Reviewed With: patient  Progress: improving  Outcome Evaluation: OT tx completed on this date. Pt A&O x4. There was some question on a helmet for OOB activities, however, RN reports at this time, no helmet has been implemented. Pt on 2.5L/NC. Pt completed short distances of functional  mobility in room from bed<>bathroom with RW requiring CGA. Pt completed toileting tasks in bathroom requiring CGA for transfer and SBA for all other aspects. Pt completed grooming tasks of washing hands/face and oral regimen requiring CGA. Pt completed bathing tasks at sink level while seated on shower bench requiring SBA for seated aspects, CGA/SBA for standing aspects. Pt is impulsive and demonstrates poor safety awareness requiring min verbal cuing for all activities. OT POC to continue.  Plan of Care Reviewed With: patient  Outcome Evaluation: OT tx completed on this date. Pt A&O x4. There was some question on a helmet for OOB activities, however, RN reports at this time, no helmet has been implemented. Pt on 2.5L/NC. Pt completed short distances of functional mobility in room from bed<>bathroom with RW requiring CGA. Pt completed toileting tasks in bathroom requiring CGA for transfer and SBA for all other aspects. Pt completed grooming tasks of washing hands/face and oral regimen requiring CGA. Pt completed bathing tasks at sink level while seated on shower bench requiring SBA for seated aspects, CGA/SBA for standing aspects. Pt is impulsive and demonstrates poor safety awareness requiring min verbal cuing for all activities. OT POC to continue.     Outcome Measures     Row Name 10/06/22 1500 10/06/22 1000          How much help from another person do you currently need...    Turning from your back to your side while in flat bed without using bedrails? -- 4  -LH (r) KW (t) LH (c)     Moving from lying on back to sitting on the side of a flat bed without bedrails? -- 3  -LH (r) KW (t) LH (c)     Moving to and from a bed to a chair (including a wheelchair)? -- 3  -LH (r) KW (t) LH (c)     Standing up from a chair using your arms (e.g., wheelchair, bedside chair)? -- 3  -LH (r) KW (t) LH (c)     Climbing 3-5 steps with a railing? -- 3  -LH (r) KW (t) LH (c)     To walk in hospital room? -- 3  -LH (r) KW (t) LH (c)      AM-PAC 6 Clicks Score (PT) -- 19  -LH (r) KW (t)            How much help from another is currently needed...    Putting on and taking off regular lower body clothing? 3  -LS --     Bathing (including washing, rinsing, and drying) 3  -LS --     Toileting (which includes using toilet bed pan or urinal) 3  -LS --     Putting on and taking off regular upper body clothing 4  -LS --     Taking care of personal grooming (such as brushing teeth) 4  -LS --     Eating meals 4  -LS --     AM-PAC 6 Clicks Score (OT) 21  -LS --            Functional Assessment    Outcome Measure Options AM-PAC 6 Clicks Daily Activity (OT)  -LS --           User Key  (r) = Recorded By, (t) = Taken By, (c) = Cosigned By    Initials Name Provider Type     Carlos Danielle, PT Physical Therapist     Lora Elmore COTA Occupational Therapist Assistant    Holly Palumbo PTA Student PTA Student                Time Calculation:    Time Calculation- OT     Row Name 10/06/22 1548 10/06/22 1103          Time Calculation- OT    OT Start Time 1420  -LS --     OT Stop Time 1520  -LS --     OT Time Calculation (min) 60 min  -LS --     Total Timed Code Minutes- OT 60 minute(s)  - --     OT Received On 10/06/22  - --            Timed Charges    05936 - Gait Training Minutes  -- 19  -MF            Total Minutes    Timed Charges Total Minutes -- 19  -MF      Total Minutes -- 19  -MF           User Key  (r) = Recorded By, (t) = Taken By, (c) = Cosigned By    Initials Name Provider Type    Zenia Pandya PTA Physical Therapist Assistant     Lora Elmore COTA Occupational Therapist Assistant              Therapy Charges for Today     Code Description Service Date Service Provider Modifiers Qty    [id number] HC OT SELF CARE/MGMT/TRAIN EA 15 MIN 10/6/2022 Lora Elmore COTA GO 4               AJ Waddell  10/6/2022

## 2022-10-06 NOTE — PROGRESS NOTES
NEUROSURGERY DAILY PROGRESS NOTE    ASSESSMENT:   Elijah Escobar is a 66 y.o. with a significant comorbidity of coronary artery disease, diabetes, erectile dysfunction, GERD, hypertension, hyperlipidemia. He presents with a new problem of 2 episodes of intermittent amnesia suggestive of seizures. Physical exam findings of left pronator drift and dysmetria with left finger-nose otherwise neurologically intact.  His imaging shows avidly contrast-enhancing meningeal based mass with dural tails measuring 2.8 x 2.8 x 2.0 cm with surrounding FLAIR signal.    Past Medical History:   Diagnosis Date   • Brain tumor (HCC)    • Coronary artery disease    • COVID-19 vaccine series completed     MODERNA X 3; LAST DOSE 3/2022   • Diabetes (HCC)    • Erectile dysfunction    • GERD (gastroesophageal reflux disease)    • Hypercholesteremia    • Hypertension      Active Hospital Problems    Diagnosis    • **S/P craniotomy      Added automatically from request for surgery [id number]     • History of cranioplasty      PLAN:   Neuro: Stable.  Intact.   2 Days Post-Op (10/4/2022) cranioplasty   CSF unremarkable   Postop CT stable   JUAN = 5 mL, removed   Continue neurochecks per policy.  Call for decline.   Continue Keppra   Transfer to floor    CV: A. fib with pulse rate .  Placed on Cardizem per cardiology.  Appreciate assist  Pulm: Maintaining O2 sat.  Continuous pulse oximetry.  Incentive spirometry.   : Voiding spontaneously.  Bladder scans and I/O cath per policy.   FEN: Tolerating regular diet.    ENDO: Accu-Chek and treat per policy  GI: No acute issues.    ID: Given history of postoperative infection, continue prophylactic antibiotics.     Wound healing protocol  Heme:  DVT prophylaxis; SCD's  Pain: Tolerable at present with oral meds.     Dispo: PT/OT.       CHIEF COMPLAINT:   Postoperative infection    Subjective  Symptom stable.  Doing well    Temp:  [98 °F (36.7 °C)-98.7 °F (37.1 °C)] 98.2 °F (36.8 °C)  Heart Rate:   "[] 96  Resp:  [14-18] 18  BP: (116-153)/(51-89) 116/51    Objective:  General Appearance:  Well-appearing and in no acute distress.    Vital signs: (most recent): Blood pressure 116/51, pulse 96, temperature 98.2 °F (36.8 °C), temperature source Oral, resp. rate 18, height 177.8 cm (70\"), weight 117 kg (257 lb 14.4 oz), SpO2 94 %.      Neurologic Exam     Mental Status   Oriented to person, place, and time.   Attention: normal. Concentration: normal.   Speech: speech is normal   Level of consciousness: alert    Bright and awake.  Oriented x3.  Follows commands without prompting showing thumbs up and 2 fingers bilaterally.     Cranial Nerves     CN II   Visual fields full to confrontation.     CN III, IV, VI   Pupils are equal, round, and reactive to light.  Extraocular motions are normal.     CN V   Facial sensation intact.     CN VII   Facial expression full, symmetric.     CN VIII   CN VIII normal.     CN IX, X   CN IX normal.     CN XI   CN XI normal.     Motor Exam   Right arm tone: normal  Left arm tone: normal  Right arm pronator drift: absent  Left arm pronator drift: absent  Right leg tone: normal  Left leg tone: normal    Strength   Right deltoid: 5/5  Left deltoid: 5/5  Right biceps: 5/5  Left biceps: 5/5  Right triceps: 5/5  Left triceps: 5/5  Right wrist extension: 5/5  Left wrist extension: 5/5  Right iliopsoas: 5/5  Left iliopsoas: 5/5  Right quadriceps: 5/5  Left quadriceps: 5/5  Right anterior tibial: 5/5  Left anterior tibial: 5/5  Right gastroc: 5/5  Left gastroc: 5/5  Right EHL 5/5  Left EHL 5/5       Sensory Exam   Right arm light touch: normal  Left arm light touch: normal  Right leg light touch: normal  Left leg light touch: normal    Gait, Coordination, and Reflexes     Tremor   Resting tremor: absent  Intention tremor: absent  Action tremor: absent    Drains:   Closed/Suction Drain 1 Right Scalp Bulb 7 Fr. (Active)       Output by Drain (mL) 10/05/22 0701 - 10/05/22 1900 10/05/22 1901 - " 10/06/22 0700 10/06/22 0701 - 10/06/22 0845 Range Total   Closed/Suction Drain 1 Right Scalp Bulb 7 Fr. 5   5       Imaging Results (Last 24 Hours)     ** No results found for the last 24 hours. **        Lab Results (last 24 hours)     Procedure Component Value Units Date/Time    Culture, CSF - Cerebrospinal Fluid, Brain [[id number]] Collected: 10/04/22 1201    Specimen: Cerebrospinal Fluid from Brain Updated: 10/06/22 0804     CSF Culture No growth at 2 days     Gram Stain Few (2+) WBCs seen      No organisms seen    POC Glucose Once [[id number]]  (Abnormal) Collected: 10/06/22 0745    Specimen: Blood Updated: 10/06/22 0756     Glucose 372 mg/dL      Comment: : [id number] You Haro ID: [id number]       Basic Metabolic Panel [[id number]]  (Abnormal) Collected: 10/06/22 0513    Specimen: Blood Updated: 10/06/22 0614     Glucose 301 mg/dL      BUN 17 mg/dL      Creatinine 0.80 mg/dL      Sodium 138 mmol/L      Potassium 4.1 mmol/L      Chloride 104 mmol/L      CO2 22.0 mmol/L      Calcium 8.5 mg/dL      BUN/Creatinine Ratio 21.3     Anion Gap 12.0 mmol/L      eGFR 97.6 mL/min/1.73      Comment: National Kidney Foundation and American Society of Nephrology (ASN) Task Force recommended calculation based on the Chronic Kidney Disease Epidemiology Collaboration (CKD-EPI) equation refit without adjustment for race.       Narrative:      GFR Normal >60  Chronic Kidney Disease <60  Kidney Failure <15      CBC & Differential [[id number]]  (Abnormal) Collected: 10/06/22 0513    Specimen: Blood Updated: 10/06/22 0550    Narrative:      The following orders were created for panel order CBC & Differential.  Procedure                               Abnormality         Status                     ---------                               -----------         ------                     CBC Auto Differential[[id number]]        Abnormal            Final result                 Please view results for these tests on the  individual orders.    CBC Auto Differential [[id number]]  (Abnormal) Collected: 10/06/22 0513    Specimen: Blood Updated: 10/06/22 0550     WBC 13.17 10*3/mm3      RBC 4.00 10*6/mm3      Hemoglobin 11.9 g/dL      Hematocrit 37.5 %      MCV 93.8 fL      MCH 29.8 pg      MCHC 31.7 g/dL      RDW 13.2 %      RDW-SD 45.4 fl      MPV 11.3 fL      Platelets 174 10*3/mm3      Neutrophil % 73.0 %      Lymphocyte % 19.4 %      Monocyte % 6.8 %      Eosinophil % 0.1 %      Basophil % 0.2 %      Immature Grans % 0.5 %      Neutrophils, Absolute 9.62 10*3/mm3      Lymphocytes, Absolute 2.55 10*3/mm3      Monocytes, Absolute 0.89 10*3/mm3      Eosinophils, Absolute 0.01 10*3/mm3      Basophils, Absolute 0.03 10*3/mm3      Immature Grans, Absolute 0.07 10*3/mm3      nRBC 0.0 /100 WBC     POC Glucose Once [[id number]]  (Abnormal) Collected: 10/05/22 1605    Specimen: Blood Updated: 10/05/22 1615     Glucose 318 mg/dL      Comment: : [id number] [employer] ID: [id number]       POC Glucose Once [[id number]]  (Abnormal) Collected: 10/05/22 1202    Specimen: Blood Updated: 10/05/22 1213     Glucose 292 mg/dL      Comment: : [id number] [employer] ID: [id number]       Vancomycin, Trough Please collect 30-60 minutes prior to dose due 10/05/22 at 1100. Thank you [[id number]]  (Normal) Collected: 10/05/22 1012    Specimen: Blood Updated: 10/05/22 1041     Vancomycin Trough 10.70 mcg/mL         [id number]  Rahul Arnold, APRN

## 2022-10-06 NOTE — PLAN OF CARE
Goal Outcome Evaluation:  Plan of Care Reviewed With: (P) patient        Progress: (P) no change  Outcome Evaluation: (P) Patient in bed upon entering room. Patient c/o pain of BLE rating pain 10/10. Patient able to ambulate 250' contact guard assist requiring verbal cueing for safety with assistive device and posture. Patient required one seated rest break before ambulating 275'. Patient performed seated exercises and sit to stand EOB. Patient will benefit from balance exercises and safety education.

## 2022-10-06 NOTE — THERAPY TREATMENT NOTE
Acute Care - Physical Therapy Treatment Note  [facility]     Patient Name: Elijah Escobar  : 1955  MRN: [id number]  Today's Date: 10/6/2022      Visit Dx:     ICD-10-CM ICD-9-CM   1. S/P craniotomy  Z98.890 V45.89   2. Decreased activities of daily living (ADL)  Z78.9 V49.89   3. Impaired mobility  Z74.09 799.89     Patient Active Problem List   Diagnosis   • Meningioma s/p craniotomy   • Hypertension   • GERD (gastroesophageal reflux disease)   • Coronary artery disease   • Class 2 severe obesity due to excess calories with serious comorbidity and body mass index (BMI) of 38.0 to 38.9 in adult (HCC)   • Type 2 diabetes mellitus with hyperglycemia and peripheral neuropathy, with long-term current use of insulin ([facility])   • Leukocytosis   • Other specified anemias   • Paroxysmal atrial fibrillation with rapid ventricular response ([facility])   • Atrial fibrillation with RVR ([facility])   • Post-operative infection   • S/P craniotomy   • Smoker   • History of cranioplasty     Past Medical History:   Diagnosis Date   • Brain tumor (HCC)    • Coronary artery disease    • COVID-19 vaccine series completed     MODERNA X 3; LAST DOSE 3/2022   • Diabetes (HCC)    • Erectile dysfunction    • GERD (gastroesophageal reflux disease)    • Hypercholesteremia    • Hypertension      Past Surgical History:   Procedure Laterality Date   • BALLOON ANGIOPLASTY, ARTERY Right    • COLONOSCOPY     • CRANIECTOMY Right 2022    Procedure: CRANIECTOMY WITH INCISIONAL WASHOUT;  Surgeon: Felipe Banerjee MD;  Location: [facility] OR;  Service: Neurosurgery;  Laterality: Right;   • CRANIOPLASTY Right 10/4/2022    Procedure: CRANIOPLASTY right with Lumbar drain;  Surgeon: Flaco Portillo MD;  Location:  [facility] OR;  Service: Neurosurgery;  Laterality: Right;   • CRANIOTOMY FOR TUMOR Right 2022    Procedure: CRANIOTOMY FOR TUMOR STERIOTACTIC WITH BRAIN LAB, right;  Surgeon: Flaco Portillo MD;  Location:  [facility] OR;  Service:  Neurosurgery;  Laterality: Right;     PT Assessment (last 12 hours)     PT Evaluation and Treatment     Row Name 10/06/22 1525 10/06/22 1009       Physical Therapy Time and Intention    Subjective Information no complaints  -LY complains of;weakness;fatigue;pain  -LH (r) KW (t) LH (c)    Document Type therapy note (daily note)  -LY therapy note (daily note)  -LH (r) KW (t) LH (c)    Mode of Treatment physical therapy  -LY physical therapy  -LH (r) KW (t) LH (c)    Row Name 10/06/22 1525 10/06/22 1009       General Information    Existing Precautions/Restrictions fall  -LY fall  -LH (r) KW (t) LH (c)    Row Name 10/06/22 1525 10/06/22 1009       Pain    Pretreatment Pain Rating 9/10  -LY 10/10  -LH (r) KW (t) LH (c)    Posttreatment Pain Rating 9/10  -LY 10/10  -LH (r) KW (t) LH (c)    Pain Location - Side/Orientation -- Bilateral  -LH (r) KW (t) LH (c)    Pain Location -- lower  -LH (r) KW (t) LH (c)    Pain Location - head  -LY extremity  -LH (r) KW (t) LH (c)    Pain Intervention(s) Medication (See MAR);Ambulation/increased activity  -LY Ambulation/increased activity;Medication (See MAR)  -LH (r) KW (t) LH (c)    Row Name 10/06/22 1525 10/06/22 1009       Bed Mobility    Bed Mobility -- supine-sit-supine  -LH (r) KW (t) LH (c)    Supine-Sit [place] (Bed Mobility) standby assist  -LY standby assist  -LH (r) KW (t) LH (c)    Supine-Sit-Supine [place] (Bed Mobility) standby assist  -LY standby assist  -LH (r) KW (t) LH (c)    Assistive Device (Bed Mobility) bed rails;head of bed elevated  -LY bed rails;head of bed elevated  -LH (r) KW (t) LH (c)    Row Name 10/06/22 1525 10/06/22 1009       Transfers    Sit-Stand [place] (Transfers) verbal cues;contact guard  -LY contact guard;verbal cues  -LH (r) KW (t) LH (c)    Stand-Sit [place] (Transfers) verbal cues;contact guard  -LY contact guard;verbal cues  -NIC (r) JO (t) NIC (c)    Row Name 10/06/22 1009          Sit-Stand Transfer    Assistive Device  (Sit-Stand Transfers) walker, front-wheeled  -LH (r) KW (t) LH (c)     Row Name 10/06/22 1009          Stand-Sit Transfer    Assistive Device (Stand-Sit Transfers) walker, front-wheeled  -LH (r) KW (t) LH (c)     Row Name 10/06/22 1525 10/06/22 1009       Gait/Stairs (Locomotion)    [place] Level (Gait) verbal cues;contact guard  -LY verbal cues;contact guard  - (r) KW (t) LH (c)    Assistive Device (Gait) -- walker, front-wheeled  -LH (r) KW (t) LH (c)    Distance in Feet (Gait) 275' x2 reps with seated rest break  -', 275' once seated rest break in between  -LH (r) KW (t) LH (c)    Deviations/Abnormal Patterns (Gait) gait speed decreased;stride length decreased  -LY stride length decreased  tends to lean to L when fatigue, walks fast pace at times  -LH (r) KW (t) LH (c)    [place] Level (Stairs) verbal cues;contact guard  -LY --    Handrail Location (Stairs) left side (ascending)  -LY --    Number of Steps (Stairs) 4 x2 reps  -LY --    Ascending Technique (Stairs) step-to-step  -LY --    Descending Technique (Stairs) step-to-step  -LY --    Comment, (Gait/Stairs) pt presents with increased veering L and R without RWX, cues for safety  -LY patient presents impulsive at times during ambulation  -LH (r) KW (t) LH (c)    Row Name 10/06/22 1009          Motor Skills    Comments, Therapeutic Exercise seated ankle pumps, LAQ, hip flexion x15; sit to stand from level bed x10  -LH (r) KW (t) LH (c)     Row Name             Wound 06/16/22 0951 Right anterior scalp Incision    Wound - Properties Group Placement Date: 06/16/22  -CHRISTIANO Placement Time: 0951  -CHRISTIANO Side: Right  -CHRISTIANO Orientation: anterior  -CHRISTIANO Location: scalp  -CHRISTIANO Primary Wound Type: Incision  -CHRISTIANO     Retired Wound - Properties Group Placement Date: 06/16/22  -CHRISTIANO Placement Time: 0951  -CHRISTIANO Side: Right  -CHRISTIANO Orientation: anterior  -CHRISTIANO Location: scalp  -CHRISTIANO Primary Wound Type: Incision  -CHRISTIANO     Retired Wound - Properties Group Date first assessed: 06/16/22   -CHRISTIANO Time first assessed: 0951 -CHRISTIANO Side: Right  -CHRISTIANO Location: scalp  -CHRISTIANO Primary Wound Type: Incision  -CHRISTIANO     Row Name             Wound 10/04/22 1242 Bilateral scalp Incision    Wound - Properties Group Placement Date: 10/04/22  -CB Placement Time: 1242  -CB Present on Hospital Admission: N  -CB Side: Bilateral  -CB Location: scalp  -CB Primary Wound Type: Incision  -CB     Retired Wound - Properties Group Placement Date: 10/04/22  -CB Placement Time: 1242  -CB Present on Hospital Admission: N  -CB Side: Bilateral  -CB Location: scalp  -CB Primary Wound Type: Incision  -CB     Retired Wound - Properties Group Date first assessed: 10/04/22  -CB Time first assessed: 1242  -CB Present on Hospital Admission: N  -CB Side: Bilateral  -CB Location: scalp  -CB Primary Wound Type: Incision  -CB     Row Name 10/06/22 1009          Plan of Care Review    Plan of Care Reviewed With patient  - (r) JO (t) NIC (c)     Progress no change  - (r) JO (t) NIC (c)     Outcome Evaluation Patient in bed upon entering room. Patient c/o pain of BLE rating pain 10/10. Patient able to ambulate 250' contact guard assist requiring verbal cueing for safety with assistive device and posture. Patient required one seated rest break before ambulating 275'. Patient performed seated exercises and sit to stand EOB. Patient will benefit from balance exercises and safety education.  - (r) JO (t) NIC (c)     Row Name 10/06/22 1525 10/06/22 1009       Positioning and Restraints    Pre-Treatment Position in bed  -LY in bed  - (r) JO (t) NIC (c)    Post Treatment Position bed  -LY bed  - (r) JO (t) NIC (c)    In Bed fowlers;call light within reach;encouraged to call for assist;exit alarm on  -LY call light within reach;fowlers;encouraged to call for assist;exit alarm on;SCD pump applied  - (r) KW (t) NIC (c)          User Key  (r) = Recorded By, (t) = Taken By, (c) = Cosigned By    Initials Name Provider Type     Carlos Danielle, PT Physical  Therapist    Ricky George RN Registered Nurse    Cary Martinez, PTA Physical Therapist Assistant    Adrian Mosquera RN Registered Nurse    Holly Palumbo, [place] Student PTA Student                Physical Therapy Education                 Title: PT OT SLP Therapies (In Progress)     Topic: Physical Therapy (In Progress)     Point: Mobility training (In Progress)     Learning Progress Summary           Patient Acceptance, E, NR by  at 10/6/2022 1055    Comment: safety with assistive device, posture    Acceptance, E, VU by  at 10/5/2022 0955    Comment: PT frequency, benefits of movement                   Point: Home exercise program (Not Started)     Learner Progress:  Not documented in this visit.          Point: Body mechanics (Not Started)     Learner Progress:  Not documented in this visit.          Point: Precautions (Not Started)     Learner Progress:  Not documented in this visit.                      User Key     Initials Effective Dates Name Provider Type Discipline     08/29/22 -  Heather Arevalo, PT Student PT Student PT     09/12/22 -  Holly Salas PTA Student PTA Student PT              PT Recommendation and Plan         Outcome Measures     Row Name 10/06/22 1500 10/06/22 1000          How much help from another person do you currently need...    Turning from your back to your side while in flat bed without using bedrails? -- 4  -LH (r) KW (t) LH (c)     Moving from lying on back to sitting on the side of a flat bed without bedrails? -- 3  -LH (r) KW (t) LH (c)     Moving to and from a bed to a chair (including a wheelchair)? -- 3  -LH (r) KW (t) LH (c)     Standing up from a chair using your arms (e.g., wheelchair, bedside chair)? -- 3  -LH (r) KW (t) LH (c)     Climbing 3-5 steps with a railing? -- 3  -LH (r) KW (t) LH (c)     To walk in hospital room? -- 3  -LH (r) KW (t) LH (c)     AM-PAC 6 Clicks Score (PT) -- 19  -LH (r) KW (t)            How much help  from another is currently needed...    Putting on and taking off regular lower body clothing? 3  -LS --     Bathing (including washing, rinsing, and drying) 3  -LS --     Toileting (which includes using toilet bed pan or urinal) 3  -LS --     Putting on and taking off regular upper body clothing 4  -LS --     Taking care of personal grooming (such as brushing teeth) 4  -LS --     Eating meals 4  -LS --     AM-PAC 6 Clicks Score (OT) 21  -LS --            Functional Assessment    Outcome Measure Options AM-PAC 6 Clicks Daily Activity (OT)  -LS --           User Key  (r) = Recorded By, (t) = Taken By, (c) = Cosigned By    Initials Name Provider Type     Carlos Danielle, PT Physical Therapist    Lora Acevedo COTA Occupational Therapist Assistant    Holly Palumbo, [place] Student PTA Student                 Time Calculation:    PT Charges     Row Name 10/06/22 1600 10/06/22 1103          Time Calculation    Start Time 1525  -LY 1009  -MF     Stop Time 1550  -LY 1038  -MF     Time Calculation (min) 25 min  -LY 29 min  -MF     PT Received On 10/06/22  -LY 10/06/22  -MF            Time Calculation- PT    Total Timed Code Minutes- PT 25 minute(s)  -LY 29 minute(s)  -MF            Timed Charges    45247 - PT Therapeutic Exercise Minutes -- 10  -MF     91556 - Gait Training Minutes  25  -LY 19  -MF            Total Minutes    Timed Charges Total Minutes 25  -LY 29  -MF      Total Minutes 25  -LY 29  -MF           User Key  (r) = Recorded By, (t) = Taken By, (c) = Cosigned By    Initials Name Provider Type    Zenia Pandya PTA Physical Therapist Assistant    Cary Martinez PTA Physical Therapist Assistant              Therapy Charges for Today     Code Description Service Date Service Provider Modifiers Qty    30332486280 HC GAIT TRAINING EA 15 MIN 10/5/2022 Cary Burnham PTA GP 2    19612399028 HC GAIT TRAINING EA 15 MIN 10/6/2022 Cary Burnham PTA GP 2          PT G-Codes  Outcome Measure  Options: AM-PAC 6 Clicks Daily Activity (OT)  AM-PAC 6 Clicks Score (PT): 19  AM-PAC 6 Clicks Score (OT): 21    Cary Burnham, PTA  10/6/2022

## 2022-10-06 NOTE — PLAN OF CARE
Goal Outcome Evaluation:           Progress: no change  Outcome Evaluation: Patient c/o of pain. See Mar. IVF abx as ordered. Patient converted to afib. MD paged and cardiology consult placed and called. Cardiezm drip started. Voiding. safety maintained at this time.   Pt is r/s for colonoscopy from 1/27/21 to 2/24/21 at [facility]

## 2022-10-06 NOTE — CONSULTS
[facility] HEART GROUP CONSULT NOTE    Patient Care Team:  Brianna Martinez APRN as PCP - General (Nurse Practitioner)  Rahul Arnold APRN as Nurse Practitioner (Nurse Practitioner)  Flaco Portillo MD as Surgeon (Neurosurgery)  Flaco Portillo, *  REASON FOR REFERRAL: A. fib with rapid ventricular rates      Subjective     Patient is a 66 y.o. male presents with history of coronary artery disease status post stent placement in the remote past, atrial fibrillation, hypertension and hyperlipidemia who underwent cardiotomy 2 days ago for brain tumor.  Around 7 PM last night he went into atrial fibrillation with rapid ventricular rates.    He does have a history of atrial fibrillation.  Review of the records show he went into rapid ventricular rates approximately 3 months ago while he was hospitalized.  He has been on sotalol in the past but this was stopped due to a prolonged QTC.  He was transitioned over to beta-blocker therapy instead.    Currently, he is on the following cardiac regimen:  1.  Norvasc 10 daily  2.  Aspirin 81 daily  3.  Lipitor 20 nightly  4.  Heparin 5000 subcu twice daily  5.  Imdur 60 daily  6.  Lisinopril 20 daily  7.  Lopressor 50 twice daily  8.  Cardizem continuous drip    Rates have been anywhere from 104 up to 131 bpm since patient converted to atrial fibrillation overnight.    Review of Systems   Review of Systems   Constitutional: Positive for fatigue.   HENT: Negative for trouble swallowing.    Eyes: Negative for pain.   Respiratory: Negative for cough, chest tightness, shortness of breath and wheezing.    Cardiovascular: Positive for palpitations. Negative for chest pain and leg swelling.   Gastrointestinal: Negative for abdominal pain, nausea and vomiting.   Musculoskeletal: Negative for arthralgias.   Skin: Negative for color change.   Neurological: Negative for dizziness and weakness.   Psychiatric/Behavioral: Negative for confusion.       History  Past  Medical History:   Diagnosis Date   • Brain tumor (HCC)    • Coronary artery disease    • COVID-19 vaccine series completed     MODERNA X 3; LAST DOSE 3/2022   • Diabetes (HCC)    • Erectile dysfunction    • GERD (gastroesophageal reflux disease)    • Hypercholesteremia    • Hypertension      Past Surgical History:   Procedure Laterality Date   • BALLOON ANGIOPLASTY, ARTERY Right    • COLONOSCOPY     • CRANIECTOMY Right 7/1/2022    Procedure: CRANIECTOMY WITH INCISIONAL WASHOUT;  Surgeon: Felipe Banerjee MD;  Location:  [facility] OR;  Service: Neurosurgery;  Laterality: Right;   • CRANIOPLASTY Right 10/4/2022    Procedure: CRANIOPLASTY right with Lumbar drain;  Surgeon: Flaco Portillo MD;  Location:  [facility] OR;  Service: Neurosurgery;  Laterality: Right;   • CRANIOTOMY FOR TUMOR Right 6/16/2022    Procedure: CRANIOTOMY FOR TUMOR STERIOTACTIC WITH BRAIN LAB, right;  Surgeon: Flaco Portillo MD;  Location:  [facility] OR;  Service: Neurosurgery;  Laterality: Right;     Family History   Problem Relation Age of Onset   • Cancer Mother         liver   • Heart disease Father      Social History     Tobacco Use   • Smoking status: Current Every Day Smoker     Packs/day: 0.25     Types: Cigarettes   • Smokeless tobacco: Never Used   Vaping Use   • Vaping Use: Never used   Substance Use Topics   • Alcohol use: Never   • Drug use: Never     Medications Prior to Admission   Medication Sig Dispense Refill Last Dose   • acetaminophen (TYLENOL) 325 MG tablet Take 2 tablets by mouth Every 4 (Four) Hours As Needed for Mild Pain .   10/3/2022 at 2100   • amLODIPine (NORVASC) 10 MG tablet Take 1 tablet by mouth Daily.   10/4/2022 at 0700   • aspirin 81 MG chewable tablet Chew 1 tablet Daily.   10/3/2022 at 1700   • atorvastatin (LIPITOR) 20 MG tablet Take 20 mg by mouth Every Night.   10/3/2022 at 2100   • butalbital-acetaminophen-caffeine (FIORICET, ESGIC) -40 MG per tablet Take 1 tablet by mouth Every 6 (Six) Hours  As Needed for Headache.   10/4/2022 at 0700   • gabapentin (NEURONTIN) 300 MG capsule Take 300 mg by mouth 3 (Three) Times a Day. As needed   10/4/2022 at 0700   • HYDROcodone-acetaminophen (NORCO) 7.5-325 MG per tablet Take 1 tablet by mouth Every 6 (Six) Hours As Needed for Moderate Pain .   10/3/2022 at 2100   • insulin detemir (LEVEMIR) 100 UNIT/ML injection Inject 30 Units under the skin into the appropriate area as directed Every Night. (Patient taking differently: Inject 35 Units under the skin into the appropriate area as directed Every Night.)  12 10/3/2022 at 2100   • Insulin Lispro (humaLOG) 100 UNIT/ML injection Inject 0-14 Units under the skin into the appropriate area as directed 4 (Four) Times a Day Before Meals & at Bedtime.  12 10/3/2022 at 2000   • levETIRAcetam (KEPPRA) 500 MG tablet Take 500 mg by mouth 2 (Two) Times a Day.   10/4/2022 at 0700   • lisinopril (PRINIVIL,ZESTRIL) 20 MG tablet Take 20 mg by mouth Daily.   10/2/2022   • metoprolol tartrate (LOPRESSOR) 50 MG tablet Take 1 tablet by mouth Every 12 (Twelve) Hours.   10/4/2022 at 0700   • nicotine (NICODERM CQ) 14 MG/24HR patch Place 1 patch on the skin as directed by provider Daily As Needed (Smoker withdrawal).   Past Week at Unknown time   • omeprazole (priLOSEC) 20 MG capsule Take 20 mg by mouth Daily.   10/4/2022 at Unknown time   • dextrose (GLUTOSE) 40 % gel Take 15 g by mouth Every 15 (Fifteen) Minutes As Needed for Low Blood Sugar (Blood sugar less than 70). (Patient not taking: No sig reported)   Not Taking at 0700   • glucagon, human recombinant, (GLUCAGEN DIAGNOSTIC) 1 MG injection Inject 1 mg into the appropriate muscle as directed by prescriber Every 15 (Fifteen) Minutes As Needed (Blood Glucose Less Than 70) for up to 360 days. (Patient not taking: No sig reported)   Not Taking at Unknown time   • isosorbide mononitrate (IMDUR) 60 MG 24 hr tablet Take 1 tablet by mouth Daily.   Unknown at Unknown time   • vitamin D  (ERGOCALCIFEROL) 1.25 MG (10105 UT) capsule capsule Take 50,000 Units by mouth 1 (One) Time Per Week.   9/28/2022     Allergies:  Patient has no known allergies.    Objective     Vital Signs  Temp:  [97.2 °F (36.2 °C)-98.7 °F (37.1 °C)] 97.2 °F (36.2 °C)  Heart Rate:  [] 96  Resp:  [16-20] 20  BP: (116-153)/(51-89) 124/69    Physical Exam:  Vitals and nursing note reviewed.   Constitutional:       Appearance: Normal and healthy appearance. Well-developed and not in distress.   Eyes:      Extraocular Movements: Extraocular movements intact.      Pupils: Pupils are equal, round, and reactive to light.   HENT:      Head: Normocephalic and atraumatic.    Mouth/Throat:      Pharynx: Oropharynx is clear.   Neck:      Vascular: JVD normal.      Trachea: Trachea normal.   Pulmonary:      Effort: Pulmonary effort is normal.      Breath sounds: Normal breath sounds. No wheezing. No rhonchi. No rales.   Cardiovascular:      PMI at left midclavicular line. Tachycardia present. Irregularly irregular rhythm. Normal S1. Normal S2.      Murmurs: There is a grade 2/6 systolic murmur.      No gallop. No rub.   Pulses:     Dorsalis pedis: 2+ bilaterally.     Posterior tibial: 2+ bilaterally.  Abdominal:      General: Bowel sounds are normal.      Palpations: Abdomen is soft.      Tenderness: There is no abdominal tenderness.   Musculoskeletal: Normal range of motion.      Cervical back: Normal range of motion and neck supple. Skin:     General: Skin is warm and dry.      Capillary Refill: Capillary refill takes less than 2 seconds.   Feet:      Right foot:      Skin integrity: Skin integrity normal.      Left foot:      Skin integrity: Skin integrity normal.   Neurological:      Mental Status: Alert and oriented to person, place and time.      Cranial Nerves: Cranial nerves are intact.      Sensory: Sensation is intact.      Motor: Motor function is intact.      Coordination: Coordination is intact.   Psychiatric:          Speech: Speech normal.         Behavior: Behavior is cooperative.       Results Review:   Lab Results (last 72 hours)     Procedure Component Value Units Date/Time    Culture, CSF - Cerebrospinal Fluid, Brain [[id number]] Collected: 10/04/22 1201    Specimen: Cerebrospinal Fluid from Brain Updated: 10/06/22 0804     CSF Culture No growth at 2 days     Gram Stain Few (2+) WBCs seen      No organisms seen    POC Glucose Once [[id number]]  (Abnormal) Collected: 10/06/22 0745    Specimen: Blood Updated: 10/06/22 0756     Glucose 372 mg/dL      Comment: : [id number] You Haro ID: [id number]       Basic Metabolic Panel [[id number]]  (Abnormal) Collected: 10/06/22 0513    Specimen: Blood Updated: 10/06/22 0614     Glucose 301 mg/dL      BUN 17 mg/dL      Creatinine 0.80 mg/dL      Sodium 138 mmol/L      Potassium 4.1 mmol/L      Chloride 104 mmol/L      CO2 22.0 mmol/L      Calcium 8.5 mg/dL      BUN/Creatinine Ratio 21.3     Anion Gap 12.0 mmol/L      eGFR 97.6 mL/min/1.73      Comment: National Kidney Foundation and American Society of Nephrology (ASN) Task Force recommended calculation based on the Chronic Kidney Disease Epidemiology Collaboration (CKD-EPI) equation refit without adjustment for race.       Narrative:      GFR Normal >60  Chronic Kidney Disease <60  Kidney Failure <15      CBC & Differential [[id number]]  (Abnormal) Collected: 10/06/22 0513    Specimen: Blood Updated: 10/06/22 0550    Narrative:      The following orders were created for panel order CBC & Differential.  Procedure                               Abnormality         Status                     ---------                               -----------         ------                     CBC Auto Differential[[id number]]        Abnormal            Final result                 Please view results for these tests on the individual orders.    CBC Auto Differential [[id number]]  (Abnormal) Collected: 10/06/22 0513    Specimen: Blood Updated:  10/06/22 0550     WBC 13.17 10*3/mm3      RBC 4.00 10*6/mm3      Hemoglobin 11.9 g/dL      Hematocrit 37.5 %      MCV 93.8 fL      MCH 29.8 pg      MCHC 31.7 g/dL      RDW 13.2 %      RDW-SD 45.4 fl      MPV 11.3 fL      Platelets 174 10*3/mm3      Neutrophil % 73.0 %      Lymphocyte % 19.4 %      Monocyte % 6.8 %      Eosinophil % 0.1 %      Basophil % 0.2 %      Immature Grans % 0.5 %      Neutrophils, Absolute 9.62 10*3/mm3      Lymphocytes, Absolute 2.55 10*3/mm3      Monocytes, Absolute 0.89 10*3/mm3      Eosinophils, Absolute 0.01 10*3/mm3      Basophils, Absolute 0.03 10*3/mm3      Immature Grans, Absolute 0.07 10*3/mm3      nRBC 0.0 /100 WBC     POC Glucose Once [[id number]]  (Abnormal) Collected: 10/05/22 1605    Specimen: Blood Updated: 10/05/22 1615     Glucose 318 mg/dL      Comment: : [id number] Glowing PlanttonMeter ID: [id number]       POC Glucose Once [[id number]]  (Abnormal) Collected: 10/05/22 1202    Specimen: Blood Updated: 10/05/22 1213     Glucose 292 mg/dL      Comment: : [id number] Cups of TeaerstonMeter ID: [id number]       Vancomycin, Trough Please collect 30-60 minutes prior to dose due 10/05/22 at 1100. Thank you [[id number]]  (Normal) Collected: 10/05/22 1012    Specimen: Blood Updated: 10/05/22 1041     Vancomycin Trough 10.70 mcg/mL     Basic Metabolic Panel [[id number]]  (Abnormal) Collected: 10/05/22 0723    Specimen: Blood Updated: 10/05/22 0753     Glucose 329 mg/dL      BUN 21 mg/dL      Creatinine 0.88 mg/dL      Sodium 135 mmol/L      Potassium 4.9 mmol/L      Comment: Slight hemolysis detected by analyzer. Results may be affected.        Chloride 102 mmol/L      CO2 18.0 mmol/L      Calcium 8.2 mg/dL      BUN/Creatinine Ratio 23.9     Anion Gap 15.0 mmol/L      eGFR 94.8 mL/min/1.73      Comment: National Kidney Foundation and American Society of Nephrology (ASN) Task Force recommended calculation based on the Chronic Kidney Disease Epidemiology Collaboration (CKD-EPI)  equation refit without adjustment for race.       Narrative:      GFR Normal >60  Chronic Kidney Disease <60  Kidney Failure <15      POC Glucose Once [[id number]]  (Abnormal) Collected: 10/05/22 0641    Specimen: Blood Updated: 10/05/22 0652     Glucose 308 mg/dL      Comment: : [id number] Debbie CadwellMeter ID: [id number]       CBC & Differential [[id number]]  (Abnormal) Collected: 10/05/22 0555    Specimen: Blood Updated: 10/05/22 0602    Narrative:      The following orders were created for panel order CBC & Differential.  Procedure                               Abnormality         Status                     ---------                               -----------         ------                     CBC Auto Differential[[id number]]        Abnormal            Final result                 Please view results for these tests on the individual orders.    CBC Auto Differential [[id number]]  (Abnormal) Collected: 10/05/22 0555    Specimen: Blood Updated: 10/05/22 0602     WBC 14.28 10*3/mm3      RBC 4.17 10*6/mm3      Hemoglobin 12.4 g/dL      Hematocrit 38.9 %      MCV 93.3 fL      MCH 29.7 pg      MCHC 31.9 g/dL      RDW 13.0 %      RDW-SD 44.3 fl      MPV 11.2 fL      Platelets 203 10*3/mm3      Neutrophil % 80.0 %      Lymphocyte % 13.9 %      Monocyte % 4.7 %      Eosinophil % 0.0 %      Basophil % 0.2 %      Immature Grans % 1.2 %      Neutrophils, Absolute 11.42 10*3/mm3      Lymphocytes, Absolute 1.99 10*3/mm3      Monocytes, Absolute 0.67 10*3/mm3      Eosinophils, Absolute 0.00 10*3/mm3      Basophils, Absolute 0.03 10*3/mm3      Immature Grans, Absolute 0.17 10*3/mm3      nRBC 0.0 /100 WBC     POC Glucose Once [[id number]]  (Abnormal) Collected: 10/04/22 1759    Specimen: Blood Updated: 10/04/22 1810     Glucose 258 mg/dL      Comment: : [email or web address] Quinten RyanMeter ID: [id number]       POC Glucose Once [[id number]]  (Abnormal) Collected: 10/04/22 1426    Specimen: Blood Updated: 10/04/22 1428     Glucose  190 mg/dL      Comment: : [id number] Kp Carcamo ID: [id number]       Cell Count With Differential, CSF [[id number]]  (Abnormal) Collected: 10/04/22 1201    Specimen: Cerebrospinal Fluid from Brain Updated: 10/04/22 1330    Narrative:      The following orders were created for panel order Cell Count With Differential, CSF.  Procedure                               Abnormality         Status                     ---------                               -----------         ------                     Cell Count, CSF - Cerebr...[[id number]]  Abnormal            Final result               Spinal fluid differentia...[[id number]]                      Final result                 Please view results for these tests on the individual orders.    Spinal fluid differential - Cerebrospinal Fluid, Lumbar Puncture [[id number]] Collected: 10/04/22 1201    Specimen: Cerebrospinal Fluid from Brain Updated: 10/04/22 1330     Lymphocytes, CSF 72 %      Monocytes, CSF 28 %     Cell Count, CSF - Cerebrospinal Fluid, Lumbar Puncture [[id number]]  (Abnormal) Collected: 10/04/22 1201    Specimen: Cerebrospinal Fluid from Brain Updated: 10/04/22 1252     Color, CSF Colorless     Supernatant Color, CSF Colorless     Appearance, CSF Clear     RBC, CSF 0 /mm3      Nucleated Cells, CSF 12 /mm3      Volume, CSF 7.5 mL      Tube Number, CSF 1     Method: Hemacytometer Method    Protein, CSF - Cerebrospinal Fluid, Brain [[id number]]  (Abnormal) Collected: 10/04/22 1201    Specimen: Cerebrospinal Fluid from Brain Updated: 10/04/22 1246     Protein, Total (CSF) 114.2 mg/dL     Glucose, CSF - Cerebrospinal Fluid, Brain [[id number]]  (Abnormal) Collected: 10/04/22 1201    Specimen: Cerebrospinal Fluid from Brain Updated: 10/04/22 1246     Glucose, CSF 94 mg/dL     VDRL, CSF - Cerebrospinal Fluid, Brain [[id number]] Collected: 10/04/22 1201    Specimen: Cerebrospinal Fluid from Brain Updated: 10/04/22 1213    POC Glucose Once [[id number]]  (Abnormal)  Collected: 10/04/22 1038    Specimen: Blood Updated: 10/04/22 1050     Glucose 194 mg/dL      Comment: : [id number] Nuñez ToniMeter ID: [id number]               Results for orders placed during the hospital encounter of 06/16/22    Adult Transthoracic Echo Complete W/ Cont if Necessary Per Protocol    Interpretation Summary  · Left ventricular ejection fraction appears to be 56 - 60%. Left ventricular systolic function is normal.  · Left ventricular wall thickness is consistent with mild concentric hypertrophy.  · Normal right ventricular cavity size and systolic function noted.  · There is no significant (greater than mild) valvular dysfunction.      Assessment & Plan       S/P craniotomy    Hypertension    Coronary artery disease    Class 2 severe obesity due to excess calories with serious comorbidity and body mass index (BMI) of 38.0 to 38.9 in adult (HCC)    Type 2 diabetes mellitus with hyperglycemia and peripheral neuropathy, with long-term current use of insulin ([facility])    Paroxysmal atrial fibrillation with rapid ventricular response ([facility])    Smoker    History of cranioplasty      Plan:    1.  Recommend staying away from antiarrhythmics given patient's history of prolonged QTC in the past on sotalol therapy.  2.  Titrate up on his p.o. dose of Lopressor 100 mg twice daily.  Titrate off of Cardizem drip as rates tolerate.  3.  Continue full anticoagulation for stroke prevention.  Patient currently on heparin.  Recommend transition to p.o. agent on discharge.  Given his history of recent intracranial surgery, would recommend Eliquis over other agents due to its lower risk of bleeding.    I discussed the patient's findings and my recommendations with patient.     We will continue to follow along.  Please call with any questions.  Thank you for the consult.    Eric Sherman,    Interventional cardiology  [facility]  10/06/22  09:12 CDT

## 2022-10-07 ENCOUNTER — READMISSION MANAGEMENT (OUTPATIENT)
Dept: CALL CENTER | Facility: HOSPITAL | Age: 67
End: 2022-10-07

## 2022-10-07 VITALS
WEIGHT: 257.9 LBS | RESPIRATION RATE: 18 BRPM | HEIGHT: 70 IN | HEART RATE: 65 BPM | TEMPERATURE: 98.4 F | OXYGEN SATURATION: 95 % | BODY MASS INDEX: 36.92 KG/M2 | SYSTOLIC BLOOD PRESSURE: 130 MMHG | DIASTOLIC BLOOD PRESSURE: 80 MMHG

## 2022-10-07 DIAGNOSIS — Z98.890 HISTORY OF CRANIOPLASTY: Primary | ICD-10-CM

## 2022-10-07 LAB
ANION GAP SERPL CALCULATED.3IONS-SCNC: 12 MMOL/L (ref 5–15)
BACTERIA SPEC AEROBE CULT: NORMAL
BASOPHILS # BLD AUTO: 0.05 10*3/MM3 (ref 0–0.2)
BASOPHILS NFR BLD AUTO: 0.4 % (ref 0–1.5)
BUN SERPL-MCNC: 13 MG/DL (ref 8–23)
BUN/CREAT SERPL: 15.5 (ref 7–25)
CALCIUM SPEC-SCNC: 8.7 MG/DL (ref 8.6–10.5)
CHLORIDE SERPL-SCNC: 105 MMOL/L (ref 98–107)
CO2 SERPL-SCNC: 25 MMOL/L (ref 22–29)
CREAT SERPL-MCNC: 0.84 MG/DL (ref 0.76–1.27)
DEPRECATED RDW RBC AUTO: 45.8 FL (ref 37–54)
EGFRCR SERPLBLD CKD-EPI 2021: 96.2 ML/MIN/1.73
EOSINOPHIL # BLD AUTO: 0.05 10*3/MM3 (ref 0–0.4)
EOSINOPHIL NFR BLD AUTO: 0.4 % (ref 0.3–6.2)
ERYTHROCYTE [DISTWIDTH] IN BLOOD BY AUTOMATED COUNT: 13.3 % (ref 12.3–15.4)
GLUCOSE BLDC GLUCOMTR-MCNC: 265 MG/DL (ref 70–130)
GLUCOSE SERPL-MCNC: 300 MG/DL (ref 65–99)
GRAM STN SPEC: NORMAL
GRAM STN SPEC: NORMAL
HCT VFR BLD AUTO: 38.6 % (ref 37.5–51)
HGB BLD-MCNC: 12.6 G/DL (ref 13–17.7)
IMM GRANULOCYTES # BLD AUTO: 0.04 10*3/MM3 (ref 0–0.05)
IMM GRANULOCYTES NFR BLD AUTO: 0.4 % (ref 0–0.5)
LYMPHOCYTES # BLD AUTO: 3.8 10*3/MM3 (ref 0.7–3.1)
LYMPHOCYTES NFR BLD AUTO: 33.6 % (ref 19.6–45.3)
MAGNESIUM SERPL-MCNC: 1.9 MG/DL (ref 1.6–2.4)
MCH RBC QN AUTO: 30.6 PG (ref 26.6–33)
MCHC RBC AUTO-ENTMCNC: 32.6 G/DL (ref 31.5–35.7)
MCV RBC AUTO: 93.7 FL (ref 79–97)
MONOCYTES # BLD AUTO: 0.96 10*3/MM3 (ref 0.1–0.9)
MONOCYTES NFR BLD AUTO: 8.5 % (ref 5–12)
NEUTROPHILS NFR BLD AUTO: 56.7 % (ref 42.7–76)
NEUTROPHILS NFR BLD AUTO: 6.41 10*3/MM3 (ref 1.7–7)
NRBC BLD AUTO-RTO: 0 /100 WBC (ref 0–0.2)
PLATELET # BLD AUTO: 194 10*3/MM3 (ref 140–450)
PMV BLD AUTO: 10.3 FL (ref 6–12)
POTASSIUM SERPL-SCNC: 3.8 MMOL/L (ref 3.5–5.2)
QT INTERVAL: 352 MS
QTC INTERVAL: 435 MS
RBC # BLD AUTO: 4.12 10*6/MM3 (ref 4.14–5.8)
SODIUM SERPL-SCNC: 142 MMOL/L (ref 136–145)
WBC NRBC COR # BLD: 11.31 10*3/MM3 (ref 3.4–10.8)

## 2022-10-07 PROCEDURE — 99232 SBSQ HOSP IP/OBS MODERATE 35: CPT | Performed by: NURSE PRACTITIONER

## 2022-10-07 PROCEDURE — 25010000002 HEPARIN (PORCINE) PER 1000 UNITS: Performed by: NURSE PRACTITIONER

## 2022-10-07 PROCEDURE — 85025 COMPLETE CBC W/AUTO DIFF WBC: CPT | Performed by: NURSE PRACTITIONER

## 2022-10-07 PROCEDURE — 83735 ASSAY OF MAGNESIUM: CPT | Performed by: HOSPITALIST

## 2022-10-07 PROCEDURE — 82962 GLUCOSE BLOOD TEST: CPT

## 2022-10-07 PROCEDURE — 63710000001 INSULIN LISPRO (HUMAN) PER 5 UNITS: Performed by: NURSE PRACTITIONER

## 2022-10-07 PROCEDURE — 80048 BASIC METABOLIC PNL TOTAL CA: CPT | Performed by: HOSPITALIST

## 2022-10-07 PROCEDURE — 99024 POSTOP FOLLOW-UP VISIT: CPT | Performed by: NURSE PRACTITIONER

## 2022-10-07 PROCEDURE — 93005 ELECTROCARDIOGRAM TRACING: CPT | Performed by: HOSPITALIST

## 2022-10-07 RX ORDER — METOPROLOL TARTRATE 100 MG/1
100 TABLET ORAL EVERY 12 HOURS SCHEDULED
Qty: 60 TABLET | Refills: 11 | Status: SHIPPED | OUTPATIENT
Start: 2022-10-07 | End: 2023-03-31

## 2022-10-07 RX ORDER — AMOXICILLIN 250 MG
1 CAPSULE ORAL DAILY
Qty: 60 TABLET | Refills: 0 | Status: ON HOLD | OUTPATIENT
Start: 2022-10-07 | End: 2023-03-31

## 2022-10-07 RX ORDER — POTASSIUM CHLORIDE 750 MG/1
40 CAPSULE, EXTENDED RELEASE ORAL AS NEEDED
Status: DISCONTINUED | OUTPATIENT
Start: 2022-10-07 | End: 2022-10-07

## 2022-10-07 RX ORDER — POTASSIUM CHLORIDE 1.5 G/1.77G
40 POWDER, FOR SOLUTION ORAL AS NEEDED
Status: DISCONTINUED | OUTPATIENT
Start: 2022-10-07 | End: 2022-10-07

## 2022-10-07 RX ORDER — MAGNESIUM SULFATE HEPTAHYDRATE 40 MG/ML
2 INJECTION, SOLUTION INTRAVENOUS AS NEEDED
Status: DISCONTINUED | OUTPATIENT
Start: 2022-10-07 | End: 2022-10-07

## 2022-10-07 RX ORDER — AMIODARONE HYDROCHLORIDE 200 MG/1
200 TABLET ORAL 2 TIMES DAILY
Qty: 60 TABLET | Refills: 0 | Status: SHIPPED | OUTPATIENT
Start: 2022-10-07 | End: 2023-01-25

## 2022-10-07 RX ORDER — MAGNESIUM SULFATE HEPTAHYDRATE 40 MG/ML
4 INJECTION, SOLUTION INTRAVENOUS AS NEEDED
Status: DISCONTINUED | OUTPATIENT
Start: 2022-10-07 | End: 2022-10-07

## 2022-10-07 RX ORDER — HYDROCODONE BITARTRATE AND ACETAMINOPHEN 7.5; 325 MG/1; MG/1
1 TABLET ORAL EVERY 6 HOURS PRN
Refills: 0
Start: 2022-10-10 | End: 2023-03-31

## 2022-10-07 RX ORDER — AMIODARONE HYDROCHLORIDE 200 MG/1
400 TABLET ORAL ONCE
Status: DISCONTINUED | OUTPATIENT
Start: 2022-10-07 | End: 2022-10-07 | Stop reason: HOSPADM

## 2022-10-07 RX ORDER — ZINC SULFATE 50(220)MG
220 CAPSULE ORAL DAILY
Qty: 1 CAPSULE | Refills: 0 | Status: SHIPPED | OUTPATIENT
Start: 2022-10-08 | End: 2022-10-09

## 2022-10-07 RX ORDER — HYDROCODONE BITARTRATE AND ACETAMINOPHEN 10; 325 MG/1; MG/1
1 TABLET ORAL EVERY 6 HOURS PRN
Qty: 12 TABLET | Refills: 0 | Status: SHIPPED | OUTPATIENT
Start: 2022-10-07 | End: 2022-10-10

## 2022-10-07 RX ORDER — ASCORBIC ACID 500 MG
500 TABLET ORAL DAILY
Qty: 1 TABLET | Refills: 0 | Status: SHIPPED | OUTPATIENT
Start: 2022-10-08 | End: 2022-10-09

## 2022-10-07 RX ORDER — MULTIVITAMIN WITH IRON
25000 TABLET ORAL DAILY
Qty: 3 CAPSULE | Refills: 0 | Status: SHIPPED | OUTPATIENT
Start: 2022-10-08 | End: 2022-10-09

## 2022-10-07 RX ADMIN — Medication 24000 UNITS: at 08:13

## 2022-10-07 RX ADMIN — LISINOPRIL 20 MG: 20 TABLET ORAL at 08:14

## 2022-10-07 RX ADMIN — PANTOPRAZOLE SODIUM 40 MG: 40 TABLET, DELAYED RELEASE ORAL at 08:13

## 2022-10-07 RX ADMIN — DOCUSATE SODIUM 50 MG AND SENNOSIDES 8.6 MG 2 TABLET: 8.6; 5 TABLET, FILM COATED ORAL at 08:13

## 2022-10-07 RX ADMIN — ASPIRIN 81 MG: 81 TABLET, CHEWABLE ORAL at 08:14

## 2022-10-07 RX ADMIN — METOPROLOL TARTRATE 100 MG: 100 TABLET ORAL at 08:14

## 2022-10-07 RX ADMIN — ISOSORBIDE MONONITRATE 60 MG: 60 TABLET, EXTENDED RELEASE ORAL at 08:13

## 2022-10-07 RX ADMIN — INSULIN LISPRO 8 UNITS: 100 INJECTION, SOLUTION INTRAVENOUS; SUBCUTANEOUS at 08:12

## 2022-10-07 RX ADMIN — GABAPENTIN 300 MG: 300 CAPSULE ORAL at 08:13

## 2022-10-07 RX ADMIN — AMLODIPINE BESYLATE 10 MG: 10 TABLET ORAL at 08:13

## 2022-10-07 RX ADMIN — ZINC SULFATE 220 MG (50 MG) CAPSULE 220 MG: CAPSULE at 08:13

## 2022-10-07 RX ADMIN — LEVETIRACETAM 500 MG: 500 TABLET, FILM COATED ORAL at 08:13

## 2022-10-07 RX ADMIN — HEPARIN SODIUM 5000 UNITS: 5000 INJECTION, SOLUTION INTRAVENOUS; SUBCUTANEOUS at 08:12

## 2022-10-07 RX ADMIN — OXYCODONE HYDROCHLORIDE AND ACETAMINOPHEN 500 MG: 500 TABLET ORAL at 08:13

## 2022-10-07 NOTE — PLAN OF CARE
Goal Outcome Evaluation:  Plan of Care Reviewed With: patient        Progress: improving  Outcome Evaluation: Pt A&Ox4. Forgetful. Up x1 w/ walker to BR. Urinal. Tele. Dsg to head removed by MD. PPP. Denies any new n/t. Sugar monitored. Plans for DC home in progress. Call light in reach. Safety maintained. Will cont to monitor.

## 2022-10-07 NOTE — CASE MANAGEMENT/SOCIAL WORK
Continued Stay Note   [place]     Patient Name: Elijah Escobar  MRN: [id number]  Today's Date: 10/7/2022    Admit Date: 10/4/2022    Plan: [facility]   Discharge Plan     Row Name 10/07/22 1119       Plan    Final Note Pt is being discharged home today. Pt has new HH orders and has [facility] already. Called Victor Manuel there [phone] and informed of d/c status and faxed new orders/info.    Row Name 10/07/22 111       Plan    Plan [facility]     Patient/Family in Agreement with Plan yes    Final Discharge Disposition Code 06 - home with home health care    Final Note Pt is being discharged home today. Pt has new HH orders, informed [facility] of d/c and faxed new orders to them. Spoke with Victor Manuel there               Discharge Codes    No documentation.               Expected Discharge Date and Time     Expected Discharge Date Expected Discharge Time    Oct 7, 2022             MONICA Dunlap

## 2022-10-07 NOTE — PROGRESS NOTES
NEUROSURGERY DAILY PROGRESS NOTE    ASSESSMENT:   Elijah Escobar is a 66 y.o. with a significant comorbidity of coronary artery disease, diabetes, erectile dysfunction, GERD, hypertension, hyperlipidemia. He presents with a new problem of 2 episodes of intermittent amnesia suggestive of seizures. Physical exam findings of left pronator drift and dysmetria with left finger-nose otherwise neurologically intact.  His imaging shows avidly contrast-enhancing meningeal based mass with dural tails measuring 2.8 x 2.8 x 2.0 cm with surrounding FLAIR signal.    Past Medical History:   Diagnosis Date   • Brain tumor (HCC)    • Coronary artery disease    • COVID-19 vaccine series completed     MODERNA X 3; LAST DOSE 3/2022   • Diabetes ([facility])    • Erectile dysfunction    • GERD (gastroesophageal reflux disease)    • Hypercholesteremia    • Hypertension      Active Hospital Problems    Diagnosis    • **S/P craniotomy      Added automatically from request for surgery [id number]     • History of cranioplasty    • Smoker    • Paroxysmal atrial fibrillation with rapid ventricular response ([facility])    • Hypertension    • Coronary artery disease    • Class 2 severe obesity due to excess calories with serious comorbidity and body mass index (BMI) of 38.0 to 38.9 in adult ([facility])    • Type 2 diabetes mellitus with hyperglycemia and peripheral neuropathy, with long-term current use of insulin ([facility])      PLAN:   Neuro: Stable.  Intact and unchanged   3 Days Post-Op (10/4/2022) cranioplasty   CSF unremarkable   Postop CT stable   JUAN removed   Continue neurochecks per policy.  Call for decline.   Continue Keppra   DC home today    CV: A. fib with pulse rate .  Off Cardizem.  Oral meds adjusted.  Appreciate cardiology  Pulm: Maintaining O2 sat.  Continuous pulse oximetry.  Incentive spirometry.   : Voiding spontaneously.  Bladder scans and I/O cath per policy.   FEN: Tolerating regular diet.    ENDO: Accu-Chek and treat per policy  GI: No  "acute issues.    ID: Given history of postoperative infection, continue prophylactic antibiotics.     Wound healing protocol  Heme:  DVT prophylaxis; SCD's  Pain: Tolerable at present with oral meds.     Dispo: PT/OT.      DC home today    CHIEF COMPLAINT:   Postoperative infection    Subjective  Symptom stable.  Doing well    Temp:  [98.3 °F (36.8 °C)-98.6 °F (37 °C)] 98.4 °F (36.9 °C)  Heart Rate:  [] 54  Resp:  [18] 18  BP: (121-139)/(63-79) 132/70    Objective:  General Appearance:  Well-appearing and in no acute distress.    Vital signs: (most recent): Blood pressure 132/70, pulse 54, temperature 98.4 °F (36.9 °C), temperature source Oral, resp. rate 18, height 177.8 cm (70\"), weight 117 kg (257 lb 14.4 oz), SpO2 94 %.      Neurologic Exam     Mental Status   Oriented to person, place, and time.   Attention: normal. Concentration: normal.   Speech: speech is normal   Level of consciousness: alert    Bright and awake.  Oriented x3.  Follows commands without prompting showing thumbs up and 2 fingers bilaterally.     Cranial Nerves     CN II   Visual fields full to confrontation.     CN III, IV, VI   Pupils are equal, round, and reactive to light.  Extraocular motions are normal.     CN V   Facial sensation intact.     CN VII   Facial expression full, symmetric.     CN VIII   CN VIII normal.     CN IX, X   CN IX normal.     CN XI   CN XI normal.     Motor Exam   Right arm tone: normal  Left arm tone: normal  Right arm pronator drift: absent  Left arm pronator drift: absent  Right leg tone: normal  Left leg tone: normal    Strength   Right deltoid: 5/5  Left deltoid: 5/5  Right biceps: 5/5  Left biceps: 5/5  Right triceps: 5/5  Left triceps: 5/5  Right wrist extension: 5/5  Left wrist extension: 5/5  Right iliopsoas: 5/5  Left iliopsoas: 5/5  Right quadriceps: 5/5  Left quadriceps: 5/5  Right anterior tibial: 5/5  Left anterior tibial: 5/5  Right gastroc: 5/5  Left gastroc: 5/5  Right EHL 5/5  Left EHL 5/5   "     Sensory Exam   Right arm light touch: normal  Left arm light touch: normal  Right leg light touch: normal  Left leg light touch: normal    Gait, Coordination, and Reflexes     Tremor   Resting tremor: absent  Intention tremor: absent  Action tremor: absent    Drains:   Closed/Suction Drain 1 Right Scalp Bulb 7 Fr. (Active)       Output by Drain (mL) 10/06/22 0701 - 10/06/22 1900 10/06/22 1901 - 10/07/22 0700 10/07/22 0701 - 10/07/22 0856 Range Total   Patient has no LDAs of requested type attached.       Imaging Results (Last 24 Hours)     ** No results found for the last 24 hours. **        Lab Results (last 24 hours)     Procedure Component Value Units Date/Time    POC Glucose Once [[id number]]  (Abnormal) Collected: 10/07/22 0754    Specimen: Blood Updated: 10/07/22 0805     Glucose 265 mg/dL      Comment: : [id number] You DeaneMeter ID: [id number]       Basic Metabolic Panel [[id number]]  (Abnormal) Collected: 10/07/22 0410    Specimen: Blood Updated: 10/07/22 0437     Glucose 300 mg/dL      BUN 13 mg/dL      Creatinine 0.84 mg/dL      Sodium 142 mmol/L      Potassium 3.8 mmol/L      Chloride 105 mmol/L      CO2 25.0 mmol/L      Calcium 8.7 mg/dL      BUN/Creatinine Ratio 15.5     Anion Gap 12.0 mmol/L      eGFR 96.2 mL/min/1.73      Comment: National Kidney Foundation and American Society of Nephrology (ASN) Task Force recommended calculation based on the Chronic Kidney Disease Epidemiology Collaboration (CKD-EPI) equation refit without adjustment for race.       Narrative:      GFR Normal >60  Chronic Kidney Disease <60  Kidney Failure <15      Magnesium [[id number]]  (Normal) Collected: 10/07/22 0410    Specimen: Blood Updated: 10/07/22 0437     Magnesium 1.9 mg/dL     CBC & Differential [[id number]]  (Abnormal) Collected: 10/07/22 0410    Specimen: Blood Updated: 10/07/22 0420    Narrative:      The following orders were created for panel order CBC & Differential.  Procedure                                Abnormality         Status                     ---------                               -----------         ------                     CBC Auto Differential[[id number]]        Abnormal            Final result                 Please view results for these tests on the individual orders.    CBC Auto Differential [[id number]]  (Abnormal) Collected: 10/07/22 0410    Specimen: Blood Updated: 10/07/22 0420     WBC 11.31 10*3/mm3      RBC 4.12 10*6/mm3      Hemoglobin 12.6 g/dL      Hematocrit 38.6 %      MCV 93.7 fL      MCH 30.6 pg      MCHC 32.6 g/dL      RDW 13.3 %      RDW-SD 45.8 fl      MPV 10.3 fL      Platelets 194 10*3/mm3      Neutrophil % 56.7 %      Lymphocyte % 33.6 %      Monocyte % 8.5 %      Eosinophil % 0.4 %      Basophil % 0.4 %      Immature Grans % 0.4 %      Neutrophils, Absolute 6.41 10*3/mm3      Lymphocytes, Absolute 3.80 10*3/mm3      Monocytes, Absolute 0.96 10*3/mm3      Eosinophils, Absolute 0.05 10*3/mm3      Basophils, Absolute 0.05 10*3/mm3      Immature Grans, Absolute 0.04 10*3/mm3      nRBC 0.0 /100 WBC     POC Glucose Once [[id number]]  (Abnormal) Collected: 10/06/22 2104    Specimen: Blood Updated: 10/06/22 2117     Glucose 359 mg/dL      Comment: : [id number] Lexie LyMeter ID: [id number]       Hemoglobin A1c [[id number]]  (Abnormal) Collected: 10/06/22 0513    Specimen: Blood Updated: 10/06/22 2007     Hemoglobin A1C 11.00 %     Narrative:      Hemoglobin A1C Ranges:    Increased Risk for Diabetes  5.7% to 6.4%  Diabetes                     >= 6.5%  Diabetic Goal                < 7.0%    POC Glucose Once [[id number]]  (Abnormal) Collected: 10/06/22 1613    Specimen: Blood Updated: 10/06/22 1625     Glucose 257 mg/dL      Comment: : [id number] You Haro ID: [id number]       VDRL, CSF - Cerebrospinal Fluid, Brain [[id number]] Collected: 10/04/22 1201    Specimen: Cerebrospinal Fluid from Brain Updated: 10/06/22 1612     VDRL, Quantitative, CSF Non Reactive     Narrative:      Performed at:  [address] [facility]  [id number]  : Tino Escalante MD, Phone:  [phone]    POC Glucose Once [[id number]]  (Abnormal) Collected: 10/06/22 1154    Specimen: Blood Updated: 10/06/22 1204     Glucose 252 mg/dL      Comment: : [id number] Genesis HerreraMeter ID: [id number]           [id number]  Rahul Arnold, APRN

## 2022-10-07 NOTE — PROGRESS NOTES
Patient counseled for 3 minutes on smoking cessation.  Patient smokes 1/4 PPD.  Patient is contemplative.  We discussed benefits to quitting including reducing risk of cancer, cardiovascular disease.  Preventing development of COPD or worsening of COPD.  We discussed methods for quitting including Nicotine replacement therapy with Patches, Gum and Medical therapy with Chantix.  Patient will follow up with PCP.

## 2022-10-07 NOTE — DISCHARGE SUMMARY
DISCHARGE SUMMARY FROM HOSPITAL    Date of Discharge:  10/7/2022    Presenting Diagnosis/History of Present Illness  S/P craniotomy [Z98.890]  History of cranioplasty [Z98.890]    Medical History   Patient Active Problem List   Diagnosis   • Meningioma s/p craniotomy   • Hypertension   • GERD (gastroesophageal reflux disease)   • Coronary artery disease   • Class 2 severe obesity due to excess calories with serious comorbidity and body mass index (BMI) of 38.0 to 38.9 in adult ([facility])   • Type 2 diabetes mellitus with hyperglycemia and peripheral neuropathy, with long-term current use of insulin ([facility])   • Leukocytosis   • Other specified anemias   • Paroxysmal atrial fibrillation with rapid ventricular response ([facility])   • Atrial fibrillation with RVR ([facility])   • Post-operative infection   • S/P craniotomy   • Smoker   • History of cranioplasty     Discharge Diagnosis/ Active Hospital Problems:   Active Hospital Problems    Diagnosis    • **S/P craniotomy      Added automatically from request for surgery [id number]     • History of cranioplasty    • Smoker    • Paroxysmal atrial fibrillation with rapid ventricular response ([facility])    • Hypertension    • Coronary artery disease    • Class 2 severe obesity due to excess calories with serious comorbidity and body mass index (BMI) of 38.0 to 38.9 in adult ([facility])    • Type 2 diabetes mellitus with hyperglycemia and peripheral neuropathy, with long-term current use of insulin ([facility])      Hospital Course  Patient is a 66 y.o. male presented with a significant medical history of coronary artery disease, diabetes, erectile dysfunction, GERD, hypertension, hyperlipidemia. He presents with a new problem of 2 episodes of intermittent amnesia suggestive of seizures. Physical exam findings of left pronator drift and dysmetria with left finger-nose otherwise neurologically intact.  His imaging shows avidly contrast-enhancing meningeal based mass with dural tails measuring 2.8 x 2.8 x 2.0 cm  with surrounding FLAIR signal.      On 10/4/2022 the patient was brought to the main operating room where he underwent an uneventful cranioplasty.  Postoperatively he  did extremely well and his neurological exam remained unchanged.  He was kept in the hospital for close neurologic monitoring, airway observation, and for pain control.  He has been able to ambulate, tolerate oral diet, oral pain medications and void.   He has been evaluated by physical therapy and occupational therapy and deemed safe for discharge home with family to continue PT/OT per home health.   Postoperative education was performed at bedside which included wound care instructions, maximal physical activity, use of postoperative pain medication including tapering, and indications for contacting the neurosurgical office vs the emergency department.  Arrangements for postoperative follow-up should be provided prior to hospital discharge.  He will be provided with an appropriate course of oral medication for pain control.  Additional information provided in hospital discharge instructions.    Procedures Performed  Procedure(s):  CRANIOPLASTY right with Lumbar drain     Consults:   Consults     Date and Time Order Name Status Description    10/6/2022  5:27 PM Inpatient Hospitalist Consult Completed     10/5/2022  8:29 PM Inpatient Cardiology Consult          Pertinent Test Results:   CT Head Without Contrast    Result Date: 10/4/2022  1. The intervertebral right frontal cranioplasty. 2. Right frontal vasogenic edema and encephalomalacia. 3. A 1.7 cm shift to the left which was not seen in the previous study. 4. A mild increase in right frontotemporal extra-axial fluid accumulation adjacent and posterior to the cranioplasty. 5. Right frontoparietal and temporal subgaleal fluid and air and subgaleal drain in place. 6. A new left frontal subgaleal fluid and air which may be postsurgical.          This report was finalized on 10/04/2022 15:27 by   Michelle Vigil MD.    XR Chest 1 View    Result Date: 9/26/2022   No acute findings. This report was finalized on 09/26/2022 14:21 by Dr. Mk Chinchilla MD.    CBC:   Results from last 7 days   Lab Units 10/07/22  0410 10/06/22  0513 10/05/22  0555   WBC 10*3/mm3 11.31* 13.17* 14.28*   HEMOGLOBIN g/dL 12.6* 11.9* 12.4*   HEMATOCRIT % 38.6 37.5 38.9   PLATELETS 10*3/mm3 194 174 203     BMP:  Results from last 7 days   Lab Units 10/07/22  0410 10/06/22  0513 10/05/22  0723   SODIUM mmol/L 142 138 135*   POTASSIUM mmol/L 3.8 4.1 4.9   CHLORIDE mmol/L 105 104 102   CO2 mmol/L 25.0 22.0 18.0*   BUN mg/dL 13 17 21   CREATININE mg/dL 0.84 0.80 0.88   GLUCOSE mg/dL 300* 301* 329*   CALCIUM mg/dL 8.7 8.5* 8.2*     Culture Results: No results found for: BLOODCX, URINECX, WOUNDCX, MRSACX, RESPCX, STOOLCX    Condition on Discharge:  Stable    Vital Signs  Temp:  [98.3 °F (36.8 °C)-98.6 °F (37 °C)] 98.4 °F (36.9 °C)  Heart Rate:  [] 54  Resp:  [18] 18  BP: (121-139)/(63-79) 132/70    Physical Exam:   Physical Exam  Vitals and nursing note reviewed.   Constitutional:       General: He is not in acute distress.     Appearance: Normal appearance. He is well-developed and well-groomed. He is obese. He is not ill-appearing, toxic-appearing or diaphoretic.      Comments: BMI 37.00   HENT:      Head: Normocephalic and atraumatic.        Right Ear: Hearing normal.      Left Ear: Hearing normal.   Eyes:      Extraocular Movements: EOM normal.      Conjunctiva/sclera: Conjunctivae normal.      Pupils: Pupils are equal, round, and reactive to light.   Neck:      Trachea: Trachea normal.   Cardiovascular:      Rate and Rhythm: Normal rate and regular rhythm.   Pulmonary:      Effort: Pulmonary effort is normal. No tachypnea, bradypnea, accessory muscle usage or respiratory distress.   Abdominal:      Palpations: Abdomen is soft.   Musculoskeletal:      Cervical back: Full passive range of motion without pain and neck supple.    Skin:     General: Skin is warm and dry.   Neurological:      Mental Status: He is alert and oriented to person, place, and time.      GCS: GCS eye subscore is 4. GCS verbal subscore is 5. GCS motor subscore is 6.   Psychiatric:         Speech: Speech normal.         Behavior: Behavior normal. Behavior is cooperative.          Neurologic Exam     Mental Status   Oriented to person, place, and time.   Attention: normal. Concentration: normal.   Speech: speech is normal   Level of consciousness: alert    Bright and awake.  Oriented x3.  Follows commands without prompting showing thumbs up and 2 fingers bilaterally.     Cranial Nerves     CN II   Visual fields full to confrontation.     CN III, IV, VI   Pupils are equal, round, and reactive to light.  Extraocular motions are normal.     CN V   Facial sensation intact.     CN VII   Facial expression full, symmetric.     CN VIII   CN VIII normal.     CN IX, X   CN IX normal.     CN XI   CN XI normal.     Motor Exam   Right arm tone: normal  Left arm tone: normal  Right arm pronator drift: absent  Left arm pronator drift: absent  Right leg tone: normal  Left leg tone: normal    Strength   Right deltoid: 5/5  Left deltoid: 5/5  Right biceps: 5/5  Left biceps: 5/5  Right triceps: 5/5  Left triceps: 5/5  Right wrist extension: 5/5  Left wrist extension: 5/5  Right iliopsoas: 5/5  Left iliopsoas: 5/5  Right quadriceps: 5/5  Left quadriceps: 5/5  Right anterior tibial: 5/5  Left anterior tibial: 5/5  Right gastroc: 5/5  Left gastroc: 5/5  Right EHL 5/5  Left EHL 5/5       Sensory Exam   Right arm light touch: normal  Left arm light touch: normal  Right leg light touch: normal  Left leg light touch: normal    Gait, Coordination, and Reflexes     Tremor   Resting tremor: absent  Intention tremor: absent  Action tremor: absent    Discharge Disposition  Home or Self Care    Discharge Medications     Discharge Medications      New Medications      Instructions Start Date    ascorbic acid 500 MG tablet  Commonly known as: VITAMIN C   500 mg, Oral, Daily   Start Date: October 8, 2022     sennosides-docusate 8.6-50 MG per tablet  Commonly known as: PERICOLACE   1 tablet, Oral, Daily      Vitamin A 2400 MCG (8000 UT) capsule   24,000 Units, Oral, Daily   Start Date: October 8, 2022     zinc sulfate 220 (50 Zn) MG capsule  Commonly known as: ZINCATE   220 mg, Oral, Daily   Start Date: October 8, 2022        Changes to Medications      Instructions Start Date   HYDROcodone-acetaminophen  MG per tablet  Commonly known as: NORCO  What changed: You were already taking a medication with the same name, and this prescription was added. Make sure you understand how and when to take each.   1 tablet, Oral, Every 6 Hours PRN      HYDROcodone-acetaminophen 7.5-325 MG per tablet  Commonly known as: COLIN  What changed: These instructions start on October 10, 2022. If you are unsure what to do until then, ask your doctor or other care provider.   1 tablet, Oral, Every 6 Hours PRN   Start Date: October 10, 2022     insulin detemir 100 UNIT/ML injection  Commonly known as: LEVEMIR  What changed: how much to take   30 Units, Subcutaneous, Nightly         Continue These Medications      Instructions Start Date   acetaminophen 325 MG tablet  Commonly known as: TYLENOL   650 mg, Oral, Every 4 Hours PRN      amLODIPine 10 MG tablet  Commonly known as: NORVASC   10 mg, Oral, Every 24 Hours Scheduled      aspirin 81 MG chewable tablet   81 mg, Oral, Daily      atorvastatin 20 MG tablet  Commonly known as: LIPITOR   20 mg, Oral, Nightly      butalbital-acetaminophen-caffeine -40 MG per tablet  Commonly known as: FIORICET, ESGIC   1 tablet, Oral, Every 6 Hours PRN      dextrose 40 % gel  Commonly known as: GLUTOSE   15 g, Oral, Every 15 Minutes PRN      gabapentin 300 MG capsule  Commonly known as: NEURONTIN   300 mg, Oral, 3 Times Daily, As needed      glucagon (human recombinant) 1 MG  injection  Commonly known as: GLUCAGEN DIAGNOSTIC   1 mg, Intramuscular, Every 15 Minutes PRN      Insulin Lispro 100 UNIT/ML injection  Commonly known as: humaLOG   0-14 Units, Subcutaneous, 4 Times Daily Before Meals & Nightly      isosorbide mononitrate 60 MG 24 hr tablet  Commonly known as: IMDUR   60 mg, Oral, Every 24 Hours Scheduled      levETIRAcetam 500 MG tablet  Commonly known as: KEPPRA   500 mg, Oral, 2 Times Daily      lisinopril 20 MG tablet  Commonly known as: PRINIVIL,ZESTRIL   20 mg, Oral, Daily      metoprolol tartrate 50 MG tablet  Commonly known as: LOPRESSOR   50 mg, Oral, Every 12 Hours Scheduled      nicotine 14 MG/24HR patch  Commonly known as: NICODERM CQ   1 patch, Transdermal, Daily PRN      omeprazole 20 MG capsule  Commonly known as: priLOSEC   20 mg, Oral, Daily      vitamin D 1.25 MG (14715 UT) capsule capsule  Commonly known as: ERGOCALCIFEROL   50,000 Units, Oral, Weekly           Discharge Diet:   Diet Instructions     Advance Diet As Tolerated           Activity at Discharge:   Activity Instructions     Activity as Tolerated      Bathing Restrictions      Type of Restriction: Bathing    Bathing Restrictions: Other    Explain Bathing Restrictions: It is very important to shower daily.  Do not scrub incisions.  Make all attempts to avoid wearing hats or touching incision.    Driving Restrictions      Type of Restriction: Driving    Driving Restrictions: No Driving While Taking Narcotics    Gradually Increase Activity Until at Pre-Hospitalization Level      Lifting Restrictions      Type of Restriction: Lifting    Lifting Restrictions: Lifting Restriction (Indicate Limit)    Weight Limit (Pounds): 8    Length of Lifting Restriction: 2-3 WEEKS         Follow-up Appointments  No future appointments.  Additional Instructions for the Follow-ups that You Need to Schedule     Ambulatory Referral to Home Health   As directed      Face to Face Visit Date: 10/7/2022    Follow-up provider for  Plan of Care?: I will be treating the patient on an ongoing basis.  Please send me the Plan of Care for signature.    Follow-up provider: JIMENEZ KATZ [[id number]]    Reason/Clinical Findings: Postop cranioplasty.  Remote history of postop infection.    Describe mobility limitations that make leaving home difficult: Knowledge deficit, imbalance and ADLs below baseline.    Nursing/Therapeutic Services Requested: Skilled Nursing Other Physical Therapy Occupational Therapy    Skilled nursing orders: Other (Wound care monitoring)    PT orders: Therapeutic exercise Gait Training Strengthening    Weight Bearing Status: As Tolerated    Occupational orders: Activities of daily living Home safety assessment    Frequency: 1 Week 1         Call MD With Problems / Concerns   As directed      Instructions: CALL WITH ANY NEW OR ADDITIONAL CONCERNS.    Order Comments: Instructions: CALL WITH ANY NEW OR ADDITIONAL CONCERNS.          Discharge Follow-up with Specified Provider: Dr. Katz; 6 Weeks   As directed      To: Dr. Katz    Follow Up: 6 Weeks    Follow Up Details: 6-8 WEEKS         Discharge Follow-up with Specified Provider: ADITYA Gallego; 2 Weeks   As directed      To: ADITYA Gallego    Follow Up: 2 Weeks             Test Results Pending at Discharge  Pending Labs     Order Current Status    Culture, CSF - Cerebrospinal Fluid, Brain Preliminary result        ADITYA Cespedes  10/07/22  09:11 CDT    [id number]

## 2022-10-07 NOTE — CONSULTS
"      [facility] Medicine Consult  Consults    Date of Admission: 10/4/2022  Date of Consult: 10/06/22    Primary Care Physician: Brianna Martinez APRN  Referring Physician: Dr. Portillo  Chief Complaint/Reason for Consultation: Hyperglycemia    Subjective   History of Present Illness  Mr. Escobar is a 66-year-old -American male admitted to the neurosurgery service on 10/4.  The patient has a history of meningioma status postcraniotomy.  He developed a postoperative infection requiring craniectomy.  The patient has a soft pseudomeningocele, and neurosurgery discussed options moving forward with patient and he elected for synthetic cranioplasty.  This was completed on 10/4.    The hospitalist service was consulted today for hyperglycemia.  The patient does have a known history of type 2 diabetes mellitus, insulin-dependent.  He is not on oral medications.  He does use sliding scale insulin as well as basal insulin at home.  His last hemoglobin A1c in May was 8.1%.  The patient states his blood glucose has been running \"high\" at home.  His blood glucoses have ranged from 190-372 while hospitalized.    Cardiology service was consulted for A. fib with RVR.  Patient apparently does have a history of this.  He currently denies any shortness of breath, chest pain, palpitations, dizziness or lightheadedness.    Review of Systems   Otherwise complete ROS is negative except as mentioned above.    Past Medical History:   Past Medical History:   Diagnosis Date   • Brain tumor (HCC)    • Coronary artery disease    • COVID-19 vaccine series completed     MODERNA X 3; LAST DOSE 3/2022   • Diabetes (HCC)    • Erectile dysfunction    • GERD (gastroesophageal reflux disease)    • Hypercholesteremia    • Hypertension      Past Surgical History:  Past Surgical History:   Procedure Laterality Date   • BALLOON ANGIOPLASTY, ARTERY Right    • COLONOSCOPY     • CRANIECTOMY Right 7/1/2022    Procedure: " CRANIECTOMY WITH INCISIONAL WASHOUT;  Surgeon: Felipe Banerjee MD;  Location: [facility] OR;  Service: Neurosurgery;  Laterality: Right;   • CRANIOPLASTY Right 10/4/2022    Procedure: CRANIOPLASTY right with Lumbar drain;  Surgeon: Flaco Portillo MD;  Location: [facility] OR;  Service: Neurosurgery;  Laterality: Right;   • CRANIOTOMY FOR TUMOR Right 6/16/2022    Procedure: CRANIOTOMY FOR TUMOR STERIOTACTIC WITH BRAIN LAB, right;  Surgeon: Flaco Portillo MD;  Location:  [facility] OR;  Service: Neurosurgery;  Laterality: Right;     Social History:  reports that he has been smoking cigarettes. He has been smoking about 0.25 packs per day. He has never used smokeless tobacco. He reports that he does not drink alcohol and does not use drugs.    Family History: family history includes Cancer in his mother; Heart disease in his father.     Allergies: No Known Allergies  Medications: Scheduled Meds:amLODIPine, 10 mg, Oral, Q24H  ascorbic acid, 500 mg, Oral, Daily  aspirin, 81 mg, Oral, Daily  atorvastatin, 20 mg, Oral, Nightly  gabapentin, 300 mg, Oral, TID  heparin (porcine), 5,000 Units, Subcutaneous, Q12H  insulin detemir, 30 Units, Subcutaneous, Nightly  insulin lispro, 0-14 Units, Subcutaneous, TID AC  isosorbide mononitrate, 60 mg, Oral, Q24H  levETIRAcetam, 500 mg, Oral, BID  lisinopril, 20 mg, Oral, Daily  metoprolol tartrate, 100 mg, Oral, Q12H  nicotine, 1 patch, Transdermal, Q24H  pantoprazole, 40 mg, Oral, QAM  sennosides-docusate, 2 tablet, Oral, BID  vancomycin, 1,250 mg, Intravenous, Q12H  Vitamin A, 24,000 Units, Oral, Daily  zinc sulfate, 220 mg, Oral, Daily      Continuous Infusions:dilTIAZem, 5-15 mg/hr, Last Rate: Stopped (10/06/22 1425)      PRN Meds:.bisacodyl  •  butalbital-acetaminophen-caffeine  •  dextrose  •  dextrose  •  glucagon (human recombinant)  •  hydrALAZINE  •  labetalol  •  ondansetron **OR** ondansetron  •  oxyCODONE-acetaminophen  •  oxyCODONE-acetaminophen  •  polyethylene  glycol    I have utilized all available immediate resources to obtain, update, and review the patient's current medications.    Objective   Objective    Physical Exam:   Temp:  [97.2 °F (36.2 °C)-98.7 °F (37.1 °C)] 98.3 °F (36.8 °C)  Heart Rate:  [] 80  Resp:  [18-20] 18  BP: (116-153)/(51-89) 121/63  Physical Exam  Vitals and nursing note reviewed.   Constitutional:       General: He is not in acute distress.     Appearance: He is not ill-appearing or toxic-appearing.   HENT:      Head: Normocephalic.        Comments: Incision with non-adherent gauze in place. No drainage noted     Mouth/Throat:      Mouth: Mucous membranes are moist.      Pharynx: Oropharynx is clear.   Cardiovascular:      Rate and Rhythm: Normal rate. Rhythm irregular.      Pulses: Normal pulses.      Heart sounds: Normal heart sounds.   Pulmonary:      Effort: Pulmonary effort is normal.      Breath sounds: Wheezing (expiratory wheeze in the left lower lobe) present. No rhonchi or rales.   Abdominal:      General: Bowel sounds are normal. There is no distension.      Palpations: Abdomen is soft.      Tenderness: There is no abdominal tenderness.   Musculoskeletal:         General: No swelling or tenderness. Normal range of motion.      Cervical back: Normal range of motion and neck supple. No tenderness.   Skin:     General: Skin is warm and dry.      Findings: No erythema or rash.   Neurological:      General: No focal deficit present.      Mental Status: He is alert and oriented to person, place, and time.   Psychiatric:         Mood and Affect: Mood normal.         Behavior: Behavior normal.         Thought Content: Thought content normal.         Judgment: Judgment normal.           Results Reviewed:  I have personally reviewed current lab, radiology, and data and agree with results.  Lab Results (last 24 hours)     Procedure Component Value Units Date/Time    POC Glucose Once [[id number]]  (Abnormal) Collected: 10/06/22 0081     Specimen: Blood Updated: 10/06/22 1625     Glucose 257 mg/dL      Comment: : [id number] You GamblenneMeter ID: [id number]       VDRL, CSF - Cerebrospinal Fluid, Brain [[id number]] Collected: 10/04/22 1201    Specimen: Cerebrospinal Fluid from Brain Updated: 10/06/22 1612     VDRL, Quantitative, CSF Non Reactive    Narrative:      Performed at:  [address] [place] [address]  [id number]  : Tino Escalante MD, Phone:  [phone]    POC Glucose Once [[id number]]  (Abnormal) Collected: 10/06/22 1154    Specimen: Blood Updated: 10/06/22 1204     Glucose 252 mg/dL      Comment: : [id number] Genesis Tan ID: [id number]       Culture, CSF - Cerebrospinal Fluid, Brain [[id number]] Collected: 10/04/22 1201    Specimen: Cerebrospinal Fluid from Brain Updated: 10/06/22 0804     CSF Culture No growth at 2 days     Gram Stain Few (2+) WBCs seen      No organisms seen    POC Glucose Once [[id number]]  (Abnormal) Collected: 10/06/22 0745    Specimen: Blood Updated: 10/06/22 0756     Glucose 372 mg/dL      Comment: : [id number] You Cleaningter ID: [id number]       Basic Metabolic Panel [[id number]]  (Abnormal) Collected: 10/06/22 0513    Specimen: Blood Updated: 10/06/22 0614     Glucose 301 mg/dL      BUN 17 mg/dL      Creatinine 0.80 mg/dL      Sodium 138 mmol/L      Potassium 4.1 mmol/L      Chloride 104 mmol/L      CO2 22.0 mmol/L      Calcium 8.5 mg/dL      BUN/Creatinine Ratio 21.3     Anion Gap 12.0 mmol/L      eGFR 97.6 mL/min/1.73      Comment: National Kidney Foundation and American Society of Nephrology (ASN) Task Force recommended calculation based on the Chronic Kidney Disease Epidemiology Collaboration (CKD-EPI) equation refit without adjustment for race.       Narrative:      GFR Normal >60  Chronic Kidney Disease <60  Kidney Failure <15      CBC & Differential [[id number]]  (Abnormal) Collected: 10/06/22 0513    Specimen: Blood Updated: 10/06/22 0550     Narrative:      The following orders were created for panel order CBC & Differential.  Procedure                               Abnormality         Status                     ---------                               -----------         ------                     CBC Auto Differential[[id number]]        Abnormal            Final result                 Please view results for these tests on the individual orders.    CBC Auto Differential [[id number]]  (Abnormal) Collected: 10/06/22 0513    Specimen: Blood Updated: 10/06/22 0550     WBC 13.17 10*3/mm3      RBC 4.00 10*6/mm3      Hemoglobin 11.9 g/dL      Hematocrit 37.5 %      MCV 93.8 fL      MCH 29.8 pg      MCHC 31.7 g/dL      RDW 13.2 %      RDW-SD 45.4 fl      MPV 11.3 fL      Platelets 174 10*3/mm3      Neutrophil % 73.0 %      Lymphocyte % 19.4 %      Monocyte % 6.8 %      Eosinophil % 0.1 %      Basophil % 0.2 %      Immature Grans % 0.5 %      Neutrophils, Absolute 9.62 10*3/mm3      Lymphocytes, Absolute 2.55 10*3/mm3      Monocytes, Absolute 0.89 10*3/mm3      Eosinophils, Absolute 0.01 10*3/mm3      Basophils, Absolute 0.03 10*3/mm3      Immature Grans, Absolute 0.07 10*3/mm3      nRBC 0.0 /100 WBC         Imaging Results (Last 24 Hours)     ** No results found for the last 24 hours. **          Assessment / Plan   Assessment:     Active Hospital Problems    Diagnosis    • **S/P craniotomy      Added automatically from request for surgery [id number]     • History of cranioplasty    • Smoker    • Paroxysmal atrial fibrillation with rapid ventricular response ([facility])    • Hypertension    • Coronary artery disease    • Class 2 severe obesity due to excess calories with serious comorbidity and body mass index (BMI) of 38.0 to 38.9 in adult ([facility])    • Type 2 diabetes mellitus with hyperglycemia and peripheral neuropathy, with long-term current use of insulin ([facility])       Plan:   Patient admitted to the neurosurgery service on 10/4 status post cranioplasty.  He does have a  history of insulin-dependent diabetes mellitus type 2.  Hospitalist service was consulted for hyperglycemia.  His blood glucose has been as high as 372.  Sliding scale insulin has been resumed per home dosing.  We will resume his Lantus at 30 units nightly.  Reassess hemoglobin A1c.  Will adjust insulin as necessary based on blood glucose readings.  Diabetes educator has been consulted.  Continue consistent carbohydrate diet.    I discussed the patients findings and my recommendations with patient and Dr. Edenilson Bingham.      Electronically signed by ADITYA Bernal, 10/6/2022, 19:52 CDT.

## 2022-10-07 NOTE — PLAN OF CARE
Goal Outcome Evaluation:  Plan of Care Reviewed With: patient        Progress: no change  Outcome Evaluation: Pt has rated his pain between 0-9/10.  Given PRN pain med, and pt stated relief.  Incision CDI, initially had small strip of dressing across incision, this disappeared sometime between 0200 and 0400.  At 0336 monitor room called to state that patient had 34 beats of vtach lasting 11.75 seconds.  Went to room, pt was sleeping.  Woke patient and he had no complaints.  Call placed to on-call cardiologist.  Orders received.  STAT bloodwork collected and EKG performed.  voiding well.  Up with assistx1.  No neuro changes.

## 2022-10-07 NOTE — DISCHARGE INSTRUCTIONS
[facility] Neurosurgery    Postoperative care following brain surgery  Dear Patient,  You have recently undergone brain surgery (cranioplasty) and are now ready to go home. These written instructions are intended to help you to recover quickly.  If you have ANY QUESTIONS about your condition prior to discharge please ask Dr. Portillo. In particular, if you have concerns about going home discuss them now. We do not want you to go until you are completely comfortable leaving the hospital.   If you have ANY QUESTIONS about your condition after you go home call your doctor. The number is [phone] which is answered 24 hours a day. During regular working hours a  will connect you to your doctor, one of his partners, or one of our nurses. At night or on weekends the answering service will connect you with the physicianon call. DO NOT HESITATE to call. We want to help you with any problems.     Seizures  Anyone who has brain surgery has a small risk of seizures postoperatively. Seizures may involve involuntary shaking of the arms and legs, loss of consciousness, loss of urinary or bowel control. They typically last a few minutes, then the patient returns to normal. Seizures are frightening to watch, but usually resolve without long-term problems. Your doctor will often attempt to prevent seizures after surgery by putting you on anti-seizure medicine like Dilantin or Keppra. Sometimes seizures occur even when these medicines are used  What to do if a seizure occurs:  If a seizure occurs and the patient does not return to normal in 10 minutes, call your Dr. Portillo or go to the local emergency room.   If multiple seizures occur, call 911.     Neurological Deficit  Neurological deficits are problems with brain function like speech difficulty, weakness, numbness, imbalance, etc. These deficits may be present before or after brain surgery. Prior to discharge Dr. Portillo will make sure that all treatment  needed to help you recover from such deficits has been instituted. He will also make sure that these deficits are stable or improving. After you go home, if you think any of your brain problems are getting worse, not better, it may be a sign of bleeding, infection, or other problems. Call Dr. Portillo. He will order tests and prescribe treatment as needed.    Deep vein thrombosis/ pulmonary embolus  Some patients who undergo surgery develop blood clots in the veins of the legs. These clots can cause pain or swelling in the legs, or may cause no obvious problem. They can break free from the legs and travel to the lungs causing shortness of breath and/or chest pain. If you develop pain or swelling in your legs after surgery, call your doctor. If you develop breathing problems or chest pain after surgery, call 911.    Medication  It is important to take your medication EXACTLY as prescribed. Some patients are reluctant to take pain medication. It is perfectly fine to take pain medication for several weeks after surgery. We want to eliminate pain whenever possible. Many pain medications can cause nausea (sick to your stomach), constipation (inability to poop), or itching. Nausea may be minimized by taking the medication with food. Constipation can be relieved by taking stool softeners and/ or laxatives that you can purchase over the counter as needed.    It is important to realize that no pain after surgery is an unrealistic expectation.  Pain medication will never reduce your pain score to zero.  The goal of pain medicine is to reduce your pain to the point you can move, take care of yourself, and participate in therapy.  Make sure to work with your caregiver to determine what is an adequate level of pain control to promote healthy movement and then take your medication to reach this goal.      You have undergone a  cranioplasty  surgery which you are expected to recover from over several weeks.     Use of opioids  immediately following surgery is often necessary.  Pain after surgery results in decreased quality of life, surgical complications, and prolonged rehabilitation.  Thus in certain situations, the benefits of a limited course of opioids may outweigh the risk.      However, multiple studies have found that patients of all ages frequently take fewer opioid pills than the amount prescribed after common surgeries.  In some cases they do not take any of the prescribed medications at all.  This results in excess opioid pills that are accessible to others, raise a concern for misuse, can be stolen by family members, can result in later addiction, or can often be used in overdose.  Therefore we are going to make every effort to only prescribe as much pain medication as necessary to limit the amount of pills that are left over after you recover.      First-line treatment should include nonopioid analgesics.  Examples of these would be Tylenol or nonsteroidal anti-inflammatories such as ibuprofen or Aleve.  - Tylenol 1000 mg every six hours as needed    Second-line treatment. If the above first-line treatments are insufficient to control your pain you have also been provided a small dose of opioids.  In order to avoid addiction you should take the lowest amount possible.    Type I: Opioid prescription for 3 days or less (every 6 hours).  May consider an additional 3-day course (every 8 hours)    EXAMPLE IF APPLICABLE:  Please taper your pain medications to avoid long term addiction.  Week 1: Take your pain medication no more than ever 6 hours as needed for severe pain.  Week 2: Take your pain medication no more than every 8 hours as needed for severe pain.  Week 3: Take your pain medication no more than every 12 hours as needed for unresolved pain.    These recommendations are based on ...  CDC guidelines for control and prevention of postsurgical pain,   [place] opioid prescribing engagement network (OPEN),   [place]  collaborative in [facility] medical directors group prescribing opioids for postoperative pain-supplemental guidance (2018)    Wound Care  Your incision is held together with either staples that need to be removed in 2 weeks or dissolvable sutures that do not need to be removed.     Seventy-two hours after surgery it is OK to get the wound wet, so you can take a shower or bath.     You do not need to put any medication (like Neosporin or Vitamin E) on the wound. Scrubbing the wound should be avoided until the staples nondissolvable sutures come out.     No nicotine products, including second-hand smoke, gum or patches. Nicotine will delay healing and increase the likelihood of a surgical complication. For help quitting, call the Quitline: 1-405.886.5413     Potential wound problems include the following:  Infection--If the wound becomes red, tender, swollen, or warm it may be infected. Infection is often accompanied by fever. If you think your wound might be infected you should call your doctor. Often you can send us a picture of the wound so we can better evaluate it.   Drainage--Fluid should not drain from your wound. If it does, call your doctor. Colored fluid may indicate infection. Clear fluid may indicate leakage of spinal fluid.   Dehiscence--If the wound does not heal properly it may open up along the staple line. This is called dehiscence. Call us immediately.   Sutures--Occasionally, one of the buried threads (sutures) may work through the skin. If you think this has happened call your doctor.   Swelling--Spinal fluid or blood may collect under the skin. This is usually harmless, but needs to be evaluated. Call your doctor.     Hydrocephalus  Your brain manufactures about one quart of clear fluid (called cerebrospinal fluid or CSF) every day. Normally this fluid is reabsorbed into the blood stream. After brain surgery the flow of fluid and the reabsorption process may be impaired. This leads  to a buildup of water on the brain that is called hydrocephalus. Hydrocephalus can cause headache, somnolence, difficulty thinking, imbalance and loss of urinary control. If you think that the patient may have hydrocephalus, call your doctor. She/He will order a brain scan. If it shows water buildup a simple operation called a shunt may be needed to fix the problem.    How to contact your doctor  The neurosurgeon, Dr. Portillo, and her/his team did your surgery and, therefore, are likely to know more about your condition than any other physicians. We are immediately available to help you with any problems after surgery. Please call us for any concerns at the following numbers:   Doctor’s office [phone] (answered 24 hours a day)   [facility]'s  [phone] (alternative emergency number for on-call neurosurgeon)     Specific instructions related to your surgery  Diet: no restrictions, eat a heart healthy diet.   Activity: as tolerated, no heavy lifting or strenuous exercise for at least 2 weeks.  ?  Keppra (levetiracetam): Keppra is an antiepileptic drug, also called an anticonvulsant. It is given to you because you either had a seizure or your condition makes you prone to have seizures. Do not stop this drug suddenly. You will need to be tapered off this medication. Report any new mood or behavior problems to your physician. These include depression, anxiety, agitation, irritability, hyperactivity, or suicidal thoughts.     Follow up:   You should follow up with Rahul BURGESS in 2 weeks for post op wound check and with Dr. Portillo in the neurosurgery clinic ([phone]) in 6 weeks.  CT head prior to arrival.

## 2022-10-07 NOTE — PROGRESS NOTES
[facility]  INPATIENT PROGRESS NOTE    Length of Stay: 3  Date of Admission: 10/4/2022  Primary Care Physician: Brianna Martinez APRN    Subjective   Chief Complaint: Follow-up hyperglycemia  HPI   Sitting up in bed.  He just finished breakfast.  Sister and brother in room.  Patient denies nausea, vomiting or abdominal pain.  Denies chest pain or palpitations.  Oxygen 1 L.  Patient does not wear oxygen at home.  Plan to wean oxygen.  He takes Levemir 35 units at bedtime and 15 units every morning.  He also uses NovoLog 3 times daily with meals.  Patient denies pain.  Sutures across top of skull.  He denies fever, chills, cough or congestion.    Review of Systems   Constitutional: Negative for appetite change, chills, fatigue and fever.   HENT: Negative for congestion and trouble swallowing.    Eyes: Negative for photophobia and visual disturbance.   Respiratory: Negative for cough, shortness of breath and wheezing.    Cardiovascular: Negative for chest pain, palpitations and leg swelling.   Gastrointestinal: Negative for constipation, diarrhea, nausea and vomiting.   Endocrine: Negative for cold intolerance, heat intolerance and polyuria.   Genitourinary: Negative for dysuria, frequency and urgency.   Musculoskeletal: Negative for back pain.   Skin: Negative for color change, pallor, rash and wound.   Allergic/Immunologic: Negative for immunocompromised state.   Neurological: Positive for weakness. Negative for light-headedness.   Hematological: Negative for adenopathy. Does not bruise/bleed easily.   Psychiatric/Behavioral: Negative for agitation, behavioral problems and confusion.      All pertinent negatives and positives are as above. All other systems have been reviewed and are negative unless otherwise stated.     Objective    Temp:  [98.3 °F (36.8 °C)-98.6 °F (37 °C)] 98.4 °F (36.9 °C)  Heart Rate:  [] 54  Resp:  [18] 18  BP: (121-139)/(63-79)  132/70  Physical Exam  Vitals and nursing note reviewed.   Constitutional:       Appearance: He is obese.      Comments: Sitting up in bed.  Oxygen at 1 L and weaned off.  Family members in room.   HENT:      Head: Normocephalic and atraumatic.      Comments: Suture line across top of head without drainage.     Nose: No congestion.      Mouth/Throat:      Pharynx: Oropharynx is clear. No oropharyngeal exudate or posterior oropharyngeal erythema.   Eyes:      Extraocular Movements: Extraocular movements intact.      Pupils: Pupils are equal, round, and reactive to light.   Cardiovascular:      Rate and Rhythm: Normal rate. Rhythm irregular.      Heart sounds: No murmur heard.     Comments: Atrial fibrillation 100 on telemetry.  Pulmonary:      Breath sounds: No wheezing, rhonchi or rales.      Comments: Oxygen removed.  Abdominal:      Palpations: Abdomen is soft.      Tenderness: There is no abdominal tenderness.   Genitourinary:     Comments: Voiding.  Musculoskeletal:         General: No swelling or tenderness.      Cervical back: Normal range of motion and neck supple.   Skin:     General: Skin is warm and dry.   Neurological:      General: No focal deficit present.      Mental Status: He is alert and oriented to person, place, and time.   Psychiatric:         Mood and Affect: Mood normal.         Behavior: Behavior normal.         Thought Content: Thought content normal.         Judgment: Judgment normal.         Results Review:  I have reviewed the labs, radiology results, and diagnostic studies.    Laboratory Data:      Results from last 7 days   Lab Units 10/07/22  0410 10/06/22  0513 10/05/22  0555   WBC 10*3/mm3 11.31* 13.17* 14.28*   HEMOGLOBIN g/dL 12.6* 11.9* 12.4*   HEMATOCRIT % 38.6 37.5 38.9   PLATELETS 10*3/mm3 194 174 203     Results from last 7 days   Lab Units 10/07/22  0410 10/06/22  0513 10/05/22  0723   SODIUM mmol/L 142 138 135*   POTASSIUM mmol/L 3.8 4.1 4.9   CHLORIDE mmol/L 105 104 102    CO2 mmol/L 25.0 22.0 18.0*   BUN mg/dL 13 17 21   CREATININE mg/dL 0.84 0.80 0.88   GLUCOSE mg/dL 300* 301* 329*   CALCIUM mg/dL 8.7 8.5* 8.2*     Culture Data:    No results found for: BLOODCX, URINECX, WOUNDCX, MRSACX, RESPCX, STOOLCX    Radiology Data:   Imaging Results (Last 7 Days)     Procedure Component Value Units Date/Time    CT Head Without Contrast [[id number]] Collected: 10/04/22 1516     Updated: 10/04/22 1530    Narrative:      EXAMINATION: CT HEAD WO CONTRAST-      10/4/2022 3:00 PM CDT     HISTORY: Eval status post cranioplasty; Z98.890-Other specified  postprocedural states     In order to have a CT radiation dose as low as reasonably achievable  Automated Exposure Control was utilized for adjustment of the mA and/or  KV according to patient size.     DLP in mGycm= 845     The CT scan of the head is performed without intravenous contrast  enhancement.     Images are acquired in axial plane with subsequent reconstruction in  coronal and sagittal plane.     Comparison is made with the previous study dated 8/2/2022. Correlation  made with MR imaging of the brain dated 8/31/2022.     There is a low-density area involving the right frontal lobe which  appears to represent a combination of vasogenic edema and  encephalomalacia.     There is an extra-axial fluid accumulation in the right frontal lobe  temporal region which measures 3.2 x 0.9 cm. This have increased in the  left since the previous study.     There is a mild shift to the left, 1.7 mm, which was not seen in the  previous study.     There is a right frontal cranioplasty which is a change since the  previous study. There is small amount of fluid and air along the right  frontoparietal and temporal scalp. There is a subgaleal drain in place  in this area.     There is effacement of the right frontoparietal cortical sulci.     There is moderate asymmetrical ventricular dilatation, left more than  the right. This is similar to the previous  study.     There is a left frontal subgaleal fluid and air which was not seen in  the previous study. This may be postsurgical changes.       Impression:      1. The intervertebral right frontal cranioplasty.  2. Right frontal vasogenic edema and encephalomalacia.  3. A 1.7 cm shift to the left which was not seen in the previous study.  4. A mild increase in right frontotemporal extra-axial fluid  accumulation adjacent and posterior to the cranioplasty.  5. Right frontoparietal and temporal subgaleal fluid and air and  subgaleal drain in place.  6. A new left frontal subgaleal fluid and air which may be postsurgical.                             This report was finalized on 10/04/2022 15:27 by Dr. Michelle Vigil MD.        Results for orders placed during the hospital encounter of 06/16/22    Adult Transthoracic Echo Complete W/ Cont if Necessary Per Protocol    Interpretation Summary  · Left ventricular ejection fraction appears to be 56 - 60%. Left ventricular systolic function is normal.  · Left ventricular wall thickness is consistent with mild concentric hypertrophy.  · Normal right ventricular cavity size and systolic function noted.  · There is no significant (greater than mild) valvular dysfunction.    Intake/Output    Intake/Output Summary (Last 24 hours) at 10/7/2022 0910  Last data filed at 10/7/2022 0133  Gross per 24 hour   Intake 250 ml   Output 400 ml   Net -150 ml       Scheduled Meds  amLODIPine, 10 mg, Oral, Q24H  ascorbic acid, 500 mg, Oral, Daily  aspirin, 81 mg, Oral, Daily  atorvastatin, 20 mg, Oral, Nightly  gabapentin, 300 mg, Oral, TID  heparin (porcine), 5,000 Units, Subcutaneous, Q12H  insulin detemir, 35 Units, Subcutaneous, Nightly  insulin lispro, 0-14 Units, Subcutaneous, TID AC  isosorbide mononitrate, 60 mg, Oral, Q24H  levETIRAcetam, 500 mg, Oral, BID  lisinopril, 20 mg, Oral, Daily  metoprolol tartrate, 100 mg, Oral, Q12H  nicotine, 1 patch, Transdermal, Q24H  pantoprazole, 40 mg,  Oral, QAM  sennosides-docusate, 2 tablet, Oral, BID  vancomycin, 1,250 mg, Intravenous, Q12H  Vitamin A, 24,000 Units, Oral, Daily  zinc sulfate, 220 mg, Oral, Daily        I have reviewed the patient current medications.     Assessment/Plan     Active Hospital Problems    Diagnosis    • **S/P craniotomy      Added automatically from request for surgery [id number]     • History of cranioplasty    • Smoker    • Paroxysmal atrial fibrillation with rapid ventricular response ([facility])    • Hypertension    • Coronary artery disease    • Class 2 severe obesity due to excess calories with serious comorbidity and body mass index (BMI) of 38.0 to 38.9 in adult ([facility])    • Type 2 diabetes mellitus with hyperglycemia and peripheral neuropathy, with long-term current use of insulin ([facility])      Plan:  1.  Admitted 10/4/2022 by Dr. Portillo for cranioplasty right and lumbar drain.  Hospital medicine consulted for assistance with hyperglycemia.    2.  Diabetes mellitus type 2 with long-term use of insulin.  Hemoglobin A1c 11.  Levemir 30 units restarted at bedtime yesterday.  Will increase Levemir to 35 units, home dose.  In addition, patient indicates takes Levemir 15 units in the morning.  He also uses NovoLog sliding scale 3 times daily with meals.  At discharge recommend patient resume home insulin regimen with scheduled Levemir twice daily and NovoLog sliding scale.  Glucoses 265, 300.  Primary care provider to adjust insulin after discharge.  Carbohydrate consistent diet.  Diabetic educator consulted.    3.  Paroxysmal atrial fibrillation.  Patient atrial fibrillation 100 on telemetry.  Continue metoprolol.  Heparin for DVT prophylaxis in the setting of recent craniotomy.  Continue aspirin.    4.  Primary hypertension.  Blood pressure 132/70.  Continue amlodipine, lisinopril, metoprolol.    5.  Reviewed home medications.    CODE STATUS/advance care planning: Full code.  The patient designates his sister, Haleigh as his surrogate  decision maker if he is unable to make decisions for himself.    The above documentation resulted from a face-to-face encounter by me Flori BURGESS, [facility].    Discharge Planning: I expect patient to be discharged per Dr. Portillo.    Electronically signed by ADITYA Lara, 10/7/2022, 09:10 CDT.

## 2022-10-07 NOTE — DISCHARGE PLACEMENT REQUEST
"JEFF ANTONIODUARTE [phone]  Virgil Escobar (66 y.o. Male)             Date of Birth   1955    Social Security Number       Address   [address] [place], [place] [address]    Home Phone   [phone]    MRN   [id number]       Mormon   Non-Christian    Marital Status                               Admission Date   10/4/22    Admission Type   Elective    Admitting Provider   Flaco Portillo MD    Attending Provider   Flaco Portillo MD    Department, Room/Bed   [facility] 3A, 334/1       Discharge Date       Discharge Disposition       Discharge Destination                               Attending Provider: Flaco Portillo MD    Allergies: No Known Allergies    Isolation: None   Infection: None   Code Status: CPR   Advance Care Planning Activity    Ht: 177.8 cm (70\")   Wt: 117 kg (257 lb 14.4 oz)    Admission Cmt: None   Principal Problem: S/P craniotomy [Z98.890] More...                 Active Insurance as of 10/4/2022     Primary Coverage     Payor Plan Insurance Group Employer/Plan Group    ANTH MEDICARE REPLACEMENT ANTHEM MEDICARE ADVANTAGE [id number]     Payor Plan Address Payor Plan Phone Number Payor Plan Fax Number Effective Dates    [address] [phone]  1/1/2022 - None Entered    [facility] [id number]       Subscriber Name Subscriber Birth Date Member ID       VIRGIL ESCOBAR 1955 [id number]                 Emergency Contacts      (Rel.) Home Phone Work Phone Mobile Phone    Cathy Guzmán (Son) -- -- [phone]    Manju Lua (Relative) -- -- [phone]    ESCOBARMAYTE CARO (Spouse) [phone] -- --    david lua (Sister) [phone] -- --    josie benavides (Sister) -- -- [phone]        Ambulatory Referral to Home Health [[id number]] (Order [id number])  Order  Date: 10/7/2022 Department: [address] Ordering/Authorizing: Rahul Arnold APRN       Order History  Outpatient  Date/Time Action Taken User " Additional Information   10/07/22 0910 Sign Rahul Arnold APRN      Order Details    Frequency Duration Priority Order Class   None None Routine Internal Referral     Start Date/Time    Start Date   10/07/22     Order Information    Order Date Service Start Date Start Time   10/07/22 Surgery 10/07/22      Reference Links    Associated Reports External References   View Encounter Current Health Referral Information     Order Questions    Question Answer Comment   Face to Face Visit Date: 10/7/2022    Follow-up provider for Plan of Care? I will be treating the patient on an ongoing basis.  Please send me the Plan of Care for signature.    Follow-up provider: JIMENEZ KATZ    Reason/Clinical Findings Postop cranioplasty.  Remote history of postop infection.    Describe mobility limitations that make leaving home difficult: Knowledge deficit, imbalance and ADLs below baseline.    Nursing/Therapeutic Services Requested Skilled Nursing     Other     Physical Therapy     Occupational Therapy    Skilled nursing orders: Other Wound care monitoring   PT orders: Therapeutic exercise     Gait Training     Strengthening    Weight Bearing Status As Tolerated    Occupational orders: Activities of daily living     Home safety assessment    Frequency: 1 Week 1             Source Order Set / Preference List    Order Set   [facility] GEN EXPRESS DISCHARGE [[id number]]     Clinical Indications     ICD-10-CM ICD-9-CM   S/P craniotomy     Z98.890 V45.89   Decreased activities of daily living (ADL)     Z78.9 V49.89   Impaired mobility     Z74.09 799.89     Reprint Order Requisition    Ambulatory Referral to Home Health (Order #[id number]) on 10/7/22     Encounter    View Encounter            Order Provider Info        Office phone Pager E-mail   Ordering User Rahul Arnold APRN [phone] -- --   Authorizing Provider Rahul Arnold APRN [phone] -- --   Attending Provider Jimenez Katz [phone] -- --      Tracking Reports    Cosign Tracking Order Transmittal Tracking   Authorized by:  ADITYA Cespedes  (NPI: [id number])            Lab Component SmartPhrase Guide    Ambulatory Referral to Home Health (Order #[id number]) on 10/7/22            History & Physical      Flaco Portillo MD at 10/04/22 1101          Chief Complaint   Patient presents with   • meningioma       Patient is here for a 2mo post op visit with an MRI. He had a craniotomy on 7/1/22.         HPI:   Interval History: Elijah Escobar has a previous history of meningioma status postcraniotomy.  Unfortunately developed postop craniotomy surgical site infection.  As result he was taken by my partner Dr. Banerjee for craniectomy.  Since this time he has done well.  He is still maintaining his oral antibiotics.  He did miss his last follow-up with infectious disease.  No fevers no discharge.  Cognition is improving.  He is doing well and able to ambulate.  He does feel little unsteady on his feet.  No headaches nausea or vomiting.  His incision is well-healed.  Recent CT showed decrease swelling in the right frontal lobe.  CT has been used to create a synthetic cranioplasty.  Pain today is a 0 out of 10.  He has been compliant with his helmet.           Current Medications          Current Outpatient Medications   Medication Sig Dispense Refill   • acetaminophen (TYLENOL) 325 MG tablet Take 2 tablets by mouth Every 4 (Four) Hours As Needed for Mild Pain .       • amLODIPine (NORVASC) 10 MG tablet Take 1 tablet by mouth Daily.       • aspirin 81 MG chewable tablet Chew 1 tablet Daily.       • atorvastatin (LIPITOR) 20 MG tablet Take 20 mg by mouth Every Night.       • bisacodyl (DULCOLAX) 10 MG suppository Insert 1 suppository into the rectum Daily As Needed for Constipation (Use if bisacodyl oral is ineffective).       • bisacodyl (DULCOLAX) 5 MG EC tablet Take 1 tablet by mouth Daily As Needed for Constipation (Use if polyethylene glycol is  ineffective).       • butalbital-acetaminophen-caffeine (FIORICET, ESGIC) -40 MG per tablet Take 1 tablet by mouth Every 6 (Six) Hours As Needed for Headache.       • dextrose (D50W) 50 % solution Infuse 50 mL into a venous catheter Every 15 (Fifteen) Minutes As Needed for Low Blood Sugar (Blood Sugar Less Than 70).       • dextrose (GLUTOSE) 40 % gel Take 15 g by mouth Every 15 (Fifteen) Minutes As Needed for Low Blood Sugar (Blood sugar less than 70).       • dilTIAZem HCl-Sodium Chloride (CARDIZEM) 125-0.7 MG/125ML-% solution Infuse 5-15 mg/hr into a venous catheter Dose Adjusted By Provider As Needed.       • famotidine (PEPCID) 40 MG tablet Take 1 tablet by mouth Daily.       • gabapentin (NEURONTIN) 300 MG capsule Take 300 mg by mouth 3 (Three) Times a Day. As needed       • glucagon, human recombinant, (GLUCAGEN DIAGNOSTIC) 1 MG injection Inject 1 mg into the appropriate muscle as directed by prescriber Every 15 (Fifteen) Minutes As Needed (Blood Glucose Less Than 70) for up to 360 days.       • HYDROcodone-acetaminophen (NORCO) 7.5-325 MG per tablet Take 1 tablet by mouth Every 6 (Six) Hours As Needed for Moderate Pain .       • insulin detemir (LEVEMIR) 100 UNIT/ML injection Inject 30 Units under the skin into the appropriate area as directed Every Night.   12   • Insulin Lispro (humaLOG) 100 UNIT/ML injection Inject 0-14 Units under the skin into the appropriate area as directed 4 (Four) Times a Day Before Meals & at Bedtime.   12   • isosorbide mononitrate (IMDUR) 60 MG 24 hr tablet Take 1 tablet by mouth Daily.       • labetalol (NORMODYNE,TRANDATE) 5 MG/ML injection Infuse 2 mL into a venous catheter Every 6 (Six) Hours As Needed for High Blood Pressure.       • metoprolol tartrate (LOPRESSOR) 50 MG tablet Take 1 tablet by mouth Every 12 (Twelve) Hours.       • niCARdipine (CARDENE) 25 mg in 250 mL NS infusion Infuse 5-15 mg/hr into a venous catheter Dose Adjusted By Provider As Needed.       •  "nicotine (NICODERM CQ) 14 MG/24HR patch Place 1 patch on the skin as directed by provider Daily As Needed (Smoker withdrawal).       • ondansetron (ZOFRAN) 4 MG tablet Take 1 tablet by mouth Every 6 (Six) Hours As Needed for Nausea or Vomiting.       • polyethylene glycol (MIRALAX) 17 g packet Take 17 g by mouth Daily As Needed (Use if senna-docusate is ineffective).       • sennosides-docusate (PERICOLACE) 8.6-50 MG per tablet Take 2 tablets by mouth 2 (Two) Times a Day.          No current facility-administered medications for this visit.            Vital Signs  Ht 177.8 cm (70\")   Wt 123 kg (271 lb)   BMI 38.88 kg/m²   Physical Exam  Eyes:      Extraocular Movements: EOM normal.      Pupils: Pupils are equal, round, and reactive to light.   Neurological:      Mental Status: He is oriented to person, place, and time.      Coordination: Finger-Nose-Finger Test abnormal (Left). Heel to Tavarez Test normal.      Gait: Gait is intact. Tandem walk normal.      Deep Tendon Reflexes:      Reflex Scores:       Tricep reflexes are 1+ on the right side and 1+ on the left side.       Bicep reflexes are 1+ on the right side and 1+ on the left side.       Brachioradialis reflexes are 1+ on the right side and 1+ on the left side.       Patellar reflexes are 1+ on the right side and 1+ on the left side.       Achilles reflexes are 1+ on the right side and 1+ on the left side.  Psychiatric:         Speech: Speech normal.         Neurologic Exam      Mental Status   Oriented to person, place, and time.   Registration: recalls 3 of 3 objects. Recall at 5 minutes: recalls 3 of 3 objects.   Attention: normal. Concentration: normal.   Speech: speech is normal   Level of consciousness: alert  Knowledge: consistent with education.      Cranial Nerves      CN II   Visual acuity: normal     CN III, IV, VI   Pupils are equal, round, and reactive to light.  Extraocular motions are normal.   Diplopia: none     CN V   Facial sensation intact. "   Right corneal reflex: normal  Left corneal reflex: normal     CN VII   Right facial weakness: none  Left facial weakness: none     CN VIII   Hearing: intact     CN IX, X   Palate: symmetric  Right gag reflex: normal  Left gag reflex: normal     CN XI   Right trapezius strength: normal  Left trapezius strength: normal     CN XII   Tongue deviation: none     Motor Exam   Right arm tone: normal  Left arm tone: normal  Right arm pronator drift: absent  Left arm pronator drift: present  Right leg tone: normal  Left leg tone: normal     Strength   Right deltoid: 5/5  Left deltoid: 5/5  Right biceps: 5/5  Left biceps: 5/5  Right triceps: 5/5  Left triceps: 5/5  Right interossei: 5/5  Left interossei: 5/5  Right iliopsoas: 5/5  Left iliopsoas: 5/5  Right quadriceps: 5/5  Left quadriceps: 5/5  Right anterior tibial: 5/5  Left anterior tibial: 5/5  Right gastroc: 5/5  Left gastroc: 5/5Right EHL 5/5   Left EHL 5/5      Sensory Exam   Light touch normal.   Proprioception normal.      Gait, Coordination, and Reflexes      Gait  Gait: normal     Coordination   Finger to nose coordination: abnormal (Left)  Heel to shin coordination: normal  Tandem walking coordination: normal     Reflexes   Right brachioradialis: 1+  Left brachioradialis: 1+  Right biceps: 1+  Left biceps: 1+  Right triceps: 1+  Left triceps: 1+  Right patellar: 1+  Left patellar: 1+  Right achilles: 1+  Left achilles: 1+  Right plantar: normal  Left plantar: normal  Right Jj: absent  Left Jj: absent  Right ankle clonus: absent  Left ankle clonus: absentArrives in wheelchair but able to ambulate easily with only minor imbalance         Incision: CDI        Results Review: Brain With & Without Contrast     Result Date: 8/31/2022  1. Evidence of interval right frontal craniectomy, with mild bulging of the right frontal lobe at the craniectomy site, there is increased FLAIR signal compatible with gliosis in the right frontal region, without a discrete  mass or abnormal intraparenchymal enhancement. There is a fluid collection at the craniectomy site deep to the scalp, which measures approximate 6.2 x 1.2 cm, the fluid appears uncomplicated and is extra-axial. This is contiguous with a smaller 22 mm scalp fluid collection more inferiorly and laterally. The 22 mm fluid collection probably represents a pseudomeningocele. 2. Mild diffuse pachymeningeal thickening over the right convexity with associated continuous nonnodular enhancement. Reactive enhancement is favored and there is no evidence of diffuse meningitis. This report was finalized on 08/31/2022 16:15 by Dr. Elijah Grover MD.     Lab Results   Component Value Date    CRP <0.30 09/26/2022    CRP 0.41 07/21/2022    CRP 0.86 (H) 07/19/2022    CRP 1.73 (H) 07/13/2022    CRP 5.93 (H) 07/05/2022    CRP 5.38 (H) 06/30/2022    CRP <0.30 05/26/2022    SEDRATE 16 09/26/2022    SEDRATE 56 (H) 07/19/2022    SEDRATE 67 (H) 07/14/2022    SEDRATE 47 (H) 07/13/2022    SEDRATE 80 (H) 07/05/2022    WBC 8.48 09/26/2022    WBC 8.58 07/25/2022    WBC 7.77 07/21/2022    WBC 8.27 07/14/2022    WBC 8.16 07/13/2022    WBC 6.96 07/10/2022    WBC 8.48 07/06/2022    WBC 11.26 (H) 07/05/2022    WBC 10.67 07/03/2022    WBC 13.63 (H) 07/02/2022    WBC 10.28 07/01/2022    WBC 14.28 (H) 06/30/2022                 Assessment/Plan:   Elijah Escobar is a 66 y.o. male with a significant comorbidity of coronary artery disease, diabetes, erectile dysfunction, GERD, hypertension, hyperlipidemia. He presents with a new problem of 2 episodes of intermittent amnesia suggestive of seizures. Physical exam findings of left pronator drift and dysmetria with left finger-nose otherwise neurologically intact.  His imaging shows avidly contrast-enhancing meningeal based mass with dural tails measuring 2.8 x 2.8 x 2.0 cm with surrounding FLAIR signal.     Meningioma  S/P Craniotomy  Postop Wound Infection  S/P Craniectomy  Elijah appears to be doing well  since I saw him last.  He remains neurologically intact.  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  Postoperative imaging is stable without signs of infection.  He continues on antibiotics.  We discussed risk benefits possible complications of conservative management versus synthetic cranioplasty.  He would like to move forward with cranioplasty.     - To OR for Cranioplasty  - ID clearance   - PCP Clearance     Seizures, simple partial secondary generalization  Recommended abstinence from driving.  No new seizures since starting Keppra.  EEG ordered.     Obesity Counseling  Elijah's Body mass index is 40.75 kg/m²..  We spent 1 minutes in weight management counseling including discussing current weight, current diet, and exercise patterns.  Additional we set goals for weight reduction.  Therefore above normal parameters. Recommendations include: educational material and exercise counseling.      Smoking:   Patient is a current smoker. I provided 1 minutes of smoking cessation. I advised the patient of the risks in continuing to use tobacco. .  I advised patient to quit, and offered support..      Electronically signed by Flaco Portillo MD at 10/04/22 1101          Discharge Summary      Rahul Arnold APRN at 10/07/22 0911          DISCHARGE SUMMARY FROM HOSPITAL    Date of Discharge:  10/7/2022    Presenting Diagnosis/History of Present Illness  S/P craniotomy [Z98.890]  History of cranioplasty [Z98.890]    Medical History   Patient Active Problem List   Diagnosis   • Meningioma s/p craniotomy   • Hypertension   • GERD (gastroesophageal reflux disease)   • Coronary artery disease   • Class 2 severe obesity due to excess calories with serious comorbidity and body mass index (BMI) of 38.0 to 38.9 in adult ([facility])   • Type 2 diabetes mellitus with hyperglycemia and peripheral neuropathy, with long-term current use of insulin ([facility])   • Leukocytosis   • Other specified anemias   • Paroxysmal atrial fibrillation with  rapid ventricular response ([facility])   • Atrial fibrillation with RVR ([facility])   • Post-operative infection   • S/P craniotomy   • Smoker   • History of cranioplasty     Discharge Diagnosis/ Active Hospital Problems:   Active Hospital Problems    Diagnosis    • **S/P craniotomy      Added automatically from request for surgery [id number]     • History of cranioplasty    • Smoker    • Paroxysmal atrial fibrillation with rapid ventricular response ([facility])    • Hypertension    • Coronary artery disease    • Class 2 severe obesity due to excess calories with serious comorbidity and body mass index (BMI) of 38.0 to 38.9 in adult ([facility])    • Type 2 diabetes mellitus with hyperglycemia and peripheral neuropathy, with long-term current use of insulin ([facility])      Hospital Course  Patient is a 66 y.o. male presented with a significant medical history of coronary artery disease, diabetes, erectile dysfunction, GERD, hypertension, hyperlipidemia. He presents with a new problem of 2 episodes of intermittent amnesia suggestive of seizures. Physical exam findings of left pronator drift and dysmetria with left finger-nose otherwise neurologically intact.  His imaging shows avidly contrast-enhancing meningeal based mass with dural tails measuring 2.8 x 2.8 x 2.0 cm with surrounding FLAIR signal.      On 10/4/2022 the patient was brought to the main operating room where he underwent an uneventful cranioplasty.  Postoperatively he  did extremely well and his neurological exam remained unchanged.  He was kept in the hospital for close neurologic monitoring, airway observation, and for pain control.  He has been able to ambulate, tolerate oral diet, oral pain medications and void.   He has been evaluated by physical therapy and occupational therapy and deemed safe for discharge home with family to continue PT/OT per home health.   Postoperative education was performed at bedside which included wound care instructions, maximal physical activity, use  of postoperative pain medication including tapering, and indications for contacting the neurosurgical office vs the emergency department.  Arrangements for postoperative follow-up should be provided prior to hospital discharge.  He will be provided with an appropriate course of oral medication for pain control.  Additional information provided in hospital discharge instructions.    Procedures Performed  Procedure(s):  CRANIOPLASTY right with Lumbar drain     Consults:   Consults     Date and Time Order Name Status Description    10/6/2022  5:27 PM Inpatient Hospitalist Consult Completed     10/5/2022  8:29 PM Inpatient Cardiology Consult          Pertinent Test Results:   CT Head Without Contrast    Result Date: 10/4/2022  1. The intervertebral right frontal cranioplasty. 2. Right frontal vasogenic edema and encephalomalacia. 3. A 1.7 cm shift to the left which was not seen in the previous study. 4. A mild increase in right frontotemporal extra-axial fluid accumulation adjacent and posterior to the cranioplasty. 5. Right frontoparietal and temporal subgaleal fluid and air and subgaleal drain in place. 6. A new left frontal subgaleal fluid and air which may be postsurgical.          This report was finalized on 10/04/2022 15:27 by Dr. Michelle Vigil MD.    XR Chest 1 View    Result Date: 9/26/2022   No acute findings. This report was finalized on 09/26/2022 14:21 by Dr. Mk Chinchilla MD.    CBC:   Results from last 7 days   Lab Units 10/07/22  0410 10/06/22  0513 10/05/22  0555   WBC 10*3/mm3 11.31* 13.17* 14.28*   HEMOGLOBIN g/dL 12.6* 11.9* 12.4*   HEMATOCRIT % 38.6 37.5 38.9   PLATELETS 10*3/mm3 194 174 203     BMP:  Results from last 7 days   Lab Units 10/07/22  0410 10/06/22  0513 10/05/22  0723   SODIUM mmol/L 142 138 135*   POTASSIUM mmol/L 3.8 4.1 4.9   CHLORIDE mmol/L 105 104 102   CO2 mmol/L 25.0 22.0 18.0*   BUN mg/dL 13 17 21   CREATININE mg/dL 0.84 0.80 0.88   GLUCOSE mg/dL 300* 301* 329*   CALCIUM  mg/dL 8.7 8.5* 8.2*     Culture Results: No results found for: BLOODCX, URINECX, WOUNDCX, MRSACX, RESPCX, STOOLCX    Condition on Discharge:  Stable    Vital Signs  Temp:  [98.3 °F (36.8 °C)-98.6 °F (37 °C)] 98.4 °F (36.9 °C)  Heart Rate:  [] 54  Resp:  [18] 18  BP: (121-139)/(63-79) 132/70    Physical Exam:   Physical Exam  Vitals and nursing note reviewed.   Constitutional:       General: He is not in acute distress.     Appearance: Normal appearance. He is well-developed and well-groomed. He is obese. He is not ill-appearing, toxic-appearing or diaphoretic.      Comments: BMI 37.00   HENT:      Head: Normocephalic and atraumatic.        Right Ear: Hearing normal.      Left Ear: Hearing normal.   Eyes:      Extraocular Movements: EOM normal.      Conjunctiva/sclera: Conjunctivae normal.      Pupils: Pupils are equal, round, and reactive to light.   Neck:      Trachea: Trachea normal.   Cardiovascular:      Rate and Rhythm: Normal rate and regular rhythm.   Pulmonary:      Effort: Pulmonary effort is normal. No tachypnea, bradypnea, accessory muscle usage or respiratory distress.   Abdominal:      Palpations: Abdomen is soft.   Musculoskeletal:      Cervical back: Full passive range of motion without pain and neck supple.   Skin:     General: Skin is warm and dry.   Neurological:      Mental Status: He is alert and oriented to person, place, and time.      GCS: GCS eye subscore is 4. GCS verbal subscore is 5. GCS motor subscore is 6.   Psychiatric:         Speech: Speech normal.         Behavior: Behavior normal. Behavior is cooperative.          Neurologic Exam     Mental Status   Oriented to person, place, and time.   Attention: normal. Concentration: normal.   Speech: speech is normal   Level of consciousness: alert    Bright and awake.  Oriented x3.  Follows commands without prompting showing thumbs up and 2 fingers bilaterally.     Cranial Nerves     CN II   Visual fields full to confrontation.     CN  III, IV, VI   Pupils are equal, round, and reactive to light.  Extraocular motions are normal.     CN V   Facial sensation intact.     CN VII   Facial expression full, symmetric.     CN VIII   CN VIII normal.     CN IX, X   CN IX normal.     CN XI   CN XI normal.     Motor Exam   Right arm tone: normal  Left arm tone: normal  Right arm pronator drift: absent  Left arm pronator drift: absent  Right leg tone: normal  Left leg tone: normal    Strength   Right deltoid: 5/5  Left deltoid: 5/5  Right biceps: 5/5  Left biceps: 5/5  Right triceps: 5/5  Left triceps: 5/5  Right wrist extension: 5/5  Left wrist extension: 5/5  Right iliopsoas: 5/5  Left iliopsoas: 5/5  Right quadriceps: 5/5  Left quadriceps: 5/5  Right anterior tibial: 5/5  Left anterior tibial: 5/5  Right gastroc: 5/5  Left gastroc: 5/5  Right EHL 5/5  Left EHL 5/5       Sensory Exam   Right arm light touch: normal  Left arm light touch: normal  Right leg light touch: normal  Left leg light touch: normal    Gait, Coordination, and Reflexes     Tremor   Resting tremor: absent  Intention tremor: absent  Action tremor: absent    Discharge Disposition  Home or Self Care    Discharge Medications     Discharge Medications      New Medications      Instructions Start Date   ascorbic acid 500 MG tablet  Commonly known as: VITAMIN C   500 mg, Oral, Daily   Start Date: October 8, 2022     sennosides-docusate 8.6-50 MG per tablet  Commonly known as: PERICOLACE   1 tablet, Oral, Daily      Vitamin A 2400 MCG (8000 UT) capsule   24,000 Units, Oral, Daily   Start Date: October 8, 2022     zinc sulfate 220 (50 Zn) MG capsule  Commonly known as: ZINCATE   220 mg, Oral, Daily   Start Date: October 8, 2022        Changes to Medications      Instructions Start Date   HYDROcodone-acetaminophen  MG per tablet  Commonly known as: NORCO  What changed: You were already taking a medication with the same name, and this prescription was added. Make sure you understand how and  when to take each.   1 tablet, Oral, Every 6 Hours PRN      HYDROcodone-acetaminophen 7.5-325 MG per tablet  Commonly known as: NORCO  What changed: These instructions start on October 10, 2022. If you are unsure what to do until then, ask your doctor or other care provider.   1 tablet, Oral, Every 6 Hours PRN   Start Date: October 10, 2022     insulin detemir 100 UNIT/ML injection  Commonly known as: LEVEMIR  What changed: how much to take   30 Units, Subcutaneous, Nightly         Continue These Medications      Instructions Start Date   acetaminophen 325 MG tablet  Commonly known as: TYLENOL   650 mg, Oral, Every 4 Hours PRN      amLODIPine 10 MG tablet  Commonly known as: NORVASC   10 mg, Oral, Every 24 Hours Scheduled      aspirin 81 MG chewable tablet   81 mg, Oral, Daily      atorvastatin 20 MG tablet  Commonly known as: LIPITOR   20 mg, Oral, Nightly      butalbital-acetaminophen-caffeine -40 MG per tablet  Commonly known as: FIORICET, ESGIC   1 tablet, Oral, Every 6 Hours PRN      dextrose 40 % gel  Commonly known as: GLUTOSE   15 g, Oral, Every 15 Minutes PRN      gabapentin 300 MG capsule  Commonly known as: NEURONTIN   300 mg, Oral, 3 Times Daily, As needed      glucagon (human recombinant) 1 MG injection  Commonly known as: GLUCAGEN DIAGNOSTIC   1 mg, Intramuscular, Every 15 Minutes PRN      Insulin Lispro 100 UNIT/ML injection  Commonly known as: humaLOG   0-14 Units, Subcutaneous, 4 Times Daily Before Meals & Nightly      isosorbide mononitrate 60 MG 24 hr tablet  Commonly known as: IMDUR   60 mg, Oral, Every 24 Hours Scheduled      levETIRAcetam 500 MG tablet  Commonly known as: KEPPRA   500 mg, Oral, 2 Times Daily      lisinopril 20 MG tablet  Commonly known as: PRINIVIL,ZESTRIL   20 mg, Oral, Daily      metoprolol tartrate 50 MG tablet  Commonly known as: LOPRESSOR   50 mg, Oral, Every 12 Hours Scheduled      nicotine 14 MG/24HR patch  Commonly known as: NICODERM CQ   1 patch, Transdermal,  Daily PRN      omeprazole 20 MG capsule  Commonly known as: priLOSEC   20 mg, Oral, Daily      vitamin D 1.25 MG (13605 UT) capsule capsule  Commonly known as: ERGOCALCIFEROL   50,000 Units, Oral, Weekly           Discharge Diet:   Diet Instructions     Advance Diet As Tolerated           Activity at Discharge:   Activity Instructions     Activity as Tolerated      Bathing Restrictions      Type of Restriction: Bathing    Bathing Restrictions: Other    Explain Bathing Restrictions: It is very important to shower daily.  Do not scrub incisions.  Make all attempts to avoid wearing hats or touching incision.    Driving Restrictions      Type of Restriction: Driving    Driving Restrictions: No Driving While Taking Narcotics    Gradually Increase Activity Until at Pre-Hospitalization Level      Lifting Restrictions      Type of Restriction: Lifting    Lifting Restrictions: Lifting Restriction (Indicate Limit)    Weight Limit (Pounds): 8    Length of Lifting Restriction: 2-3 WEEKS         Follow-up Appointments  No future appointments.  Additional Instructions for the Follow-ups that You Need to Schedule     Ambulatory Referral to Home Health   As directed      Face to Face Visit Date: 10/7/2022    Follow-up provider for Plan of Care?: I will be treating the patient on an ongoing basis.  Please send me the Plan of Care for signature.    Follow-up provider: JIMENEZ KATZ [[id number]]    Reason/Clinical Findings: Postop cranioplasty.  Remote history of postop infection.    Describe mobility limitations that make leaving home difficult: Knowledge deficit, imbalance and ADLs below baseline.    Nursing/Therapeutic Services Requested: Skilled Nursing Other Physical Therapy Occupational Therapy    Skilled nursing orders: Other (Wound care monitoring)    PT orders: Therapeutic exercise Gait Training Strengthening    Weight Bearing Status: As Tolerated    Occupational orders: Activities of daily living Home safety assessment     Frequency: 1 Week 1         Call MD With Problems / Concerns   As directed      Instructions: CALL WITH ANY NEW OR ADDITIONAL CONCERNS.    Order Comments: Instructions: CALL WITH ANY NEW OR ADDITIONAL CONCERNS.          Discharge Follow-up with Specified Provider: Dr. Portillo; 6 Weeks   As directed      To: Dr. Portillo    Follow Up: 6 Weeks    Follow Up Details: 6-8 WEEKS         Discharge Follow-up with Specified Provider: ADITYA Gallego; 2 Weeks   As directed      To: ADITYA Gallego    Follow Up: 2 Weeks             Test Results Pending at Discharge  Pending Labs     Order Current Status    Culture, CSF - Cerebrospinal Fluid, Brain Preliminary result        ADITYA Cespedes  10/07/22  09:11 CDT    [id number]      Electronically signed by Rahul Arnold APRN at 10/07/22 0914

## 2022-10-07 NOTE — PROGRESS NOTES
[facility] -  Progress Note     LOS: 3 days   Patient Care Team:  Brianna Martinez APRN as PCP - General (Nurse Practitioner)  Rahul Arnold APRN as Nurse Practitioner (Nurse Practitioner)  Flaco Portillo MD as Surgeon (Neurosurgery)    Chief Complaint: follow up atrial fibrillation    Subjective     Interval History:     Patient Complaints: At the time of my exam, the patient is dressed and off of telemetry and waiting for his ride and has already been discharged.    Per review of telemetry around 03 30 this morning, presumably while the patient was sleeping he had approximately 11 to 12 seconds of nonsustained ventricular tachycardia.    He denies any chest pain, shortness of breath, palpitations, syncope or presyncope.    Prior to having his telemetry removed he was in atrial fibrillation with heart rates in the low 100s to 110s.        Review of Systems:     Review of Systems   Constitutional: Negative for diaphoresis, fatigue, fever and unexpected weight change.   HENT: Negative for nosebleeds.    Respiratory: Negative for apnea, cough, chest tightness, shortness of breath and wheezing.    Cardiovascular: Negative for chest pain, palpitations and leg swelling.   Gastrointestinal: Negative for abdominal distention, nausea and vomiting.   Genitourinary: Negative for hematuria.   Musculoskeletal: Negative for gait problem.   Skin: Negative for color change.   Neurological: Negative for dizziness, syncope, weakness and light-headedness.     Objective     Vital Sign Min/Max for last 24 hours  Temp  Min: 98.4 °F (36.9 °C)  Max: 98.6 °F (37 °C)   BP  Min: 130/80  Max: 139/79   Pulse  Min: 54  Max: 110   Resp  Min: 18  Max: 18   SpO2  Min: 94 %  Max: 97 %   No data recorded   No data recorded         10/05/22  1105   Weight: 117 kg (257 lb 14.4 oz)       Physical Exam:    Vitals and nursing note reviewed.   Constitutional:       General: Not in acute distress.     Appearance:  Well-developed and not in distress. Not diaphoretic.   Neck:      Vascular: No JVD.   Pulmonary:      Effort: Pulmonary effort is normal. No respiratory distress.      Breath sounds: Normal breath sounds.   Cardiovascular:      Tachycardia present. Irregularly irregular rhythm.      Murmurs: There is a systolic murmur.   Edema:     Peripheral edema absent.   Abdominal:      Tenderness: There is no abdominal tenderness.   Skin:     General: Skin is warm and dry.   Neurological:      Mental Status: Alert and oriented to person, place, and time.       Results Review:   Lab Results (last 72 hours)     Procedure Component Value Units Date/Time    Culture, CSF - Cerebrospinal Fluid, Brain [[id number]] Collected: 10/04/22 1201    Specimen: Cerebrospinal Fluid from Brain Updated: 10/07/22 0917     CSF Culture No growth at 3 days     Gram Stain Few (2+) WBCs seen      No organisms seen    POC Glucose Once [[id number]]  (Abnormal) Collected: 10/07/22 0754    Specimen: Blood Updated: 10/07/22 0805     Glucose 265 mg/dL      Comment: : [id number] You RegandelanoeMeter ID: [id number]       Basic Metabolic Panel [[id number]]  (Abnormal) Collected: 10/07/22 0410    Specimen: Blood Updated: 10/07/22 0437     Glucose 300 mg/dL      BUN 13 mg/dL      Creatinine 0.84 mg/dL      Sodium 142 mmol/L      Potassium 3.8 mmol/L      Chloride 105 mmol/L      CO2 25.0 mmol/L      Calcium 8.7 mg/dL      BUN/Creatinine Ratio 15.5     Anion Gap 12.0 mmol/L      eGFR 96.2 mL/min/1.73      Comment: National Kidney Foundation and American Society of Nephrology (ASN) Task Force recommended calculation based on the Chronic Kidney Disease Epidemiology Collaboration (CKD-EPI) equation refit without adjustment for race.       Narrative:      GFR Normal >60  Chronic Kidney Disease <60  Kidney Failure <15      Magnesium [[id number]]  (Normal) Collected: 10/07/22 0410    Specimen: Blood Updated: 10/07/22 0437     Magnesium 1.9 mg/dL     CBC & Differential  [[id number]]  (Abnormal) Collected: 10/07/22 0410    Specimen: Blood Updated: 10/07/22 0420    Narrative:      The following orders were created for panel order CBC & Differential.  Procedure                               Abnormality         Status                     ---------                               -----------         ------                     CBC Auto Differential[[id number]]        Abnormal            Final result                 Please view results for these tests on the individual orders.    CBC Auto Differential [[id number]]  (Abnormal) Collected: 10/07/22 0410    Specimen: Blood Updated: 10/07/22 0420     WBC 11.31 10*3/mm3      RBC 4.12 10*6/mm3      Hemoglobin 12.6 g/dL      Hematocrit 38.6 %      MCV 93.7 fL      MCH 30.6 pg      MCHC 32.6 g/dL      RDW 13.3 %      RDW-SD 45.8 fl      MPV 10.3 fL      Platelets 194 10*3/mm3      Neutrophil % 56.7 %      Lymphocyte % 33.6 %      Monocyte % 8.5 %      Eosinophil % 0.4 %      Basophil % 0.4 %      Immature Grans % 0.4 %      Neutrophils, Absolute 6.41 10*3/mm3      Lymphocytes, Absolute 3.80 10*3/mm3      Monocytes, Absolute 0.96 10*3/mm3      Eosinophils, Absolute 0.05 10*3/mm3      Basophils, Absolute 0.05 10*3/mm3      Immature Grans, Absolute 0.04 10*3/mm3      nRBC 0.0 /100 WBC     POC Glucose Once [[id number]]  (Abnormal) Collected: 10/06/22 2104    Specimen: Blood Updated: 10/06/22 2117     Glucose 359 mg/dL      Comment: : [id number] [facility] ID: [id number]       Hemoglobin A1c [[id number]]  (Abnormal) Collected: 10/06/22 0513    Specimen: Blood Updated: 10/06/22 2007     Hemoglobin A1C 11.00 %     Narrative:      Hemoglobin A1C Ranges:    Increased Risk for Diabetes  5.7% to 6.4%  Diabetes                     >= 6.5%  Diabetic Goal                < 7.0%    POC Glucose Once [[id number]]  (Abnormal) Collected: 10/06/22 1613    Specimen: Blood Updated: 10/06/22 1625     Glucose 257 mg/dL      Comment: : [id number] You  Estrellita ID: [id number]       VDRL, CSF - Cerebrospinal Fluid, Brain [[id number]] Collected: 10/04/22 1201    Specimen: Cerebrospinal Fluid from Brain Updated: 10/06/22 1612     VDRL, Quantitative, CSF Non Reactive    Narrative:      Performed at:   - [facility]  [id number]  : Tino Escalante MD, Phone:  [phone]    POC Glucose Once [[id number]]  (Abnormal) Collected: 10/06/22 1154    Specimen: Blood Updated: 10/06/22 1204     Glucose 252 mg/dL      Comment: : [id number] Genesis Tan ID: [id number]       POC Glucose Once [[id number]]  (Abnormal) Collected: 10/06/22 0745    Specimen: Blood Updated: 10/06/22 0756     Glucose 372 mg/dL      Comment: : [id number] You Haro ID: [id number]       Basic Metabolic Panel [[id number]]  (Abnormal) Collected: 10/06/22 0513    Specimen: Blood Updated: 10/06/22 0614     Glucose 301 mg/dL      BUN 17 mg/dL      Creatinine 0.80 mg/dL      Sodium 138 mmol/L      Potassium 4.1 mmol/L      Chloride 104 mmol/L      CO2 22.0 mmol/L      Calcium 8.5 mg/dL      BUN/Creatinine Ratio 21.3     Anion Gap 12.0 mmol/L      eGFR 97.6 mL/min/1.73      Comment: National Kidney Foundation and American Society of Nephrology (ASN) Task Force recommended calculation based on the Chronic Kidney Disease Epidemiology Collaboration (CKD-EPI) equation refit without adjustment for race.       Narrative:      GFR Normal >60  Chronic Kidney Disease <60  Kidney Failure <15      CBC & Differential [[id number]]  (Abnormal) Collected: 10/06/22 0513    Specimen: Blood Updated: 10/06/22 0550    Narrative:      The following orders were created for panel order CBC & Differential.  Procedure                               Abnormality         Status                     ---------                               -----------         ------                     CBC Auto Differential[[id number]]        Abnormal            Final result                  Please view results for these tests on the individual orders.    CBC Auto Differential [[id number]]  (Abnormal) Collected: 10/06/22 0513    Specimen: Blood Updated: 10/06/22 0550     WBC 13.17 10*3/mm3      RBC 4.00 10*6/mm3      Hemoglobin 11.9 g/dL      Hematocrit 37.5 %      MCV 93.8 fL      MCH 29.8 pg      MCHC 31.7 g/dL      RDW 13.2 %      RDW-SD 45.4 fl      MPV 11.3 fL      Platelets 174 10*3/mm3      Neutrophil % 73.0 %      Lymphocyte % 19.4 %      Monocyte % 6.8 %      Eosinophil % 0.1 %      Basophil % 0.2 %      Immature Grans % 0.5 %      Neutrophils, Absolute 9.62 10*3/mm3      Lymphocytes, Absolute 2.55 10*3/mm3      Monocytes, Absolute 0.89 10*3/mm3      Eosinophils, Absolute 0.01 10*3/mm3      Basophils, Absolute 0.03 10*3/mm3      Immature Grans, Absolute 0.07 10*3/mm3      nRBC 0.0 /100 WBC     POC Glucose Once [[id number]]  (Abnormal) Collected: 10/05/22 1605    Specimen: Blood Updated: 10/05/22 1615     Glucose 318 mg/dL      Comment: : [id number] [employer] KierstonMeter ID: [id number]       POC Glucose Once [[id number]]  (Abnormal) Collected: 10/05/22 1202    Specimen: Blood Updated: 10/05/22 1213     Glucose 292 mg/dL      Comment: : [id number] [employer] KierstonMeter ID: [id number]       Vancomycin, Trough Please collect 30-60 minutes prior to dose due 10/05/22 at 1100. Thank you [[id number]]  (Normal) Collected: 10/05/22 1012    Specimen: Blood Updated: 10/05/22 1041     Vancomycin Trough 10.70 mcg/mL     Basic Metabolic Panel [[id number]]  (Abnormal) Collected: 10/05/22 0723    Specimen: Blood Updated: 10/05/22 0753     Glucose 329 mg/dL      BUN 21 mg/dL      Creatinine 0.88 mg/dL      Sodium 135 mmol/L      Potassium 4.9 mmol/L      Comment: Slight hemolysis detected by analyzer. Results may be affected.        Chloride 102 mmol/L      CO2 18.0 mmol/L      Calcium 8.2 mg/dL      BUN/Creatinine Ratio 23.9     Anion Gap 15.0 mmol/L      eGFR 94.8 mL/min/1.73      Comment: National  Kidney Foundation and American Society of Nephrology (ASN) Task Force recommended calculation based on the Chronic Kidney Disease Epidemiology Collaboration (CKD-EPI) equation refit without adjustment for race.       Narrative:      GFR Normal >60  Chronic Kidney Disease <60  Kidney Failure <15      POC Glucose Once [[id number]]  (Abnormal) Collected: 10/05/22 0641    Specimen: Blood Updated: 10/05/22 0652     Glucose 308 mg/dL      Comment: : [id number] Debbie CadwellMeter ID: [id number]       CBC & Differential [[id number]]  (Abnormal) Collected: 10/05/22 0555    Specimen: Blood Updated: 10/05/22 0602    Narrative:      The following orders were created for panel order CBC & Differential.  Procedure                               Abnormality         Status                     ---------                               -----------         ------                     CBC Auto Differential[[id number]]        Abnormal            Final result                 Please view results for these tests on the individual orders.    CBC Auto Differential [[id number]]  (Abnormal) Collected: 10/05/22 0555    Specimen: Blood Updated: 10/05/22 0602     WBC 14.28 10*3/mm3      RBC 4.17 10*6/mm3      Hemoglobin 12.4 g/dL      Hematocrit 38.9 %      MCV 93.3 fL      MCH 29.7 pg      MCHC 31.9 g/dL      RDW 13.0 %      RDW-SD 44.3 fl      MPV 11.2 fL      Platelets 203 10*3/mm3      Neutrophil % 80.0 %      Lymphocyte % 13.9 %      Monocyte % 4.7 %      Eosinophil % 0.0 %      Basophil % 0.2 %      Immature Grans % 1.2 %      Neutrophils, Absolute 11.42 10*3/mm3      Lymphocytes, Absolute 1.99 10*3/mm3      Monocytes, Absolute 0.67 10*3/mm3      Eosinophils, Absolute 0.00 10*3/mm3      Basophils, Absolute 0.03 10*3/mm3      Immature Grans, Absolute 0.17 10*3/mm3      nRBC 0.0 /100 WBC     POC Glucose Once [[id number]]  (Abnormal) Collected: 10/04/22 1759    Specimen: Blood Updated: 10/04/22 1810     Glucose 258 mg/dL      Comment: :  [email or web address] Quinten FullerMeter ID: [id number]       POC Glucose Once [[id number]]  (Abnormal) Collected: 10/04/22 1426    Specimen: Blood Updated: 10/04/22 1428     Glucose 190 mg/dL      Comment: : [id number] Kp JacksonMetdaniel ID: [id number]                 Echo EF Estimated  No results found for: ECHOEFEST      Cath Ejection Fraction Quantitative  No results found for: CATHEF        Medication Review: yes  Current Facility-Administered Medications   Medication Dose Route Frequency Provider Last Rate Last Admin   • amiodarone (PACERONE) tablet 400 mg  400 mg Oral Once Knees, ADITYA Sommers       • amLODIPine (NORVASC) tablet 10 mg  10 mg Oral Q24H RustRahul APRN   10 mg at 10/07/22 0813   • ascorbic acid (VITAMIN C) tablet 500 mg  500 mg Oral Daily RustRahul APRN   500 mg at 10/07/22 0813   • aspirin chewable tablet 81 mg  81 mg Oral Daily RustRahul APRN   81 mg at 10/07/22 0814   • atorvastatin (LIPITOR) tablet 20 mg  20 mg Oral Nightly RustRahul APRN   20 mg at 10/06/22 2245   • bisacodyl (DULCOLAX) EC tablet 5 mg  5 mg Oral Daily PRN FedericotRahul APRN       • butalbital-acetaminophen-caffeine (FIORICET, ESGIC) -40 MG per tablet 1 tablet  1 tablet Oral Q6H PRN Rahul Arnold APRN       • dextrose (D50W) (25 g/50 mL) IV injection 25 g  25 g Intravenous Q15 Min PRN Rahul Arnold APRN       • dextrose (GLUTOSE) oral gel 15 g  15 g Oral Q15 Min PRN Rahul Arnold APRN       • dilTIAZem (CARDIZEM) 125 mg in 125 mL 0.7% sodium chloride  infusion  5-15 mg/hr Intravenous Titrated Eliza Kelley APRN   Stopped at 10/06/22 1425   • gabapentin (NEURONTIN) capsule 300 mg  300 mg Oral TID RustRahul APRN   300 mg at 10/07/22 0813   • glucagon (human recombinant) (GLUCAGEN DIAGNOSTIC) injection 1 mg  1 mg Intramuscular Q15 Min PRN Rahul Arnold APRN       • heparin (porcine) 5000 UNIT/ML injection 5,000 Units  5,000 Units Subcutaneous Q12H Rahul Arnold APRN   5,000 Units at 10/07/22 0812    • hydrALAZINE (APRESOLINE) injection 10 mg  10 mg Intravenous Q6H PRN Rahul Arnold APRN       • insulin detemir (LEVEMIR) injection 35 Units  35 Units Subcutaneous Nightly Flori Grissom APRN       • Insulin Lispro (humaLOG) injection 0-14 Units  0-14 Units Subcutaneous TID AC Rahul Arnold APRN   8 Units at 10/07/22 0812   • isosorbide mononitrate (IMDUR) 24 hr tablet 60 mg  60 mg Oral Q24H Rahul Arnold APRN   60 mg at 10/07/22 0813   • labetalol (NORMODYNE,TRANDATE) injection 10 mg  10 mg Intravenous Q4H PRN Rahul Arnold APRN       • levETIRAcetam (KEPPRA) tablet 500 mg  500 mg Oral BID Rahul Arnold APRN   500 mg at 10/07/22 0813   • lisinopril (PRINIVIL,ZESTRIL) tablet 20 mg  20 mg Oral Daily Rahul Arnodl APRN   20 mg at 10/07/22 0814   • metoprolol tartrate (LOPRESSOR) tablet 100 mg  100 mg Oral Q12H Eric Sherman DO   100 mg at 10/07/22 0814   • nicotine (NICODERM CQ) 14 MG/24HR patch 1 patch  1 patch Transdermal Q24H Rahul Arnold APRN   1 patch at 10/06/22 1701   • ondansetron (ZOFRAN) tablet 4 mg  4 mg Oral Q6H PRN Rahul Arnold APRN        Or   • ondansetron (ZOFRAN) injection 4 mg  4 mg Intravenous Q6H PRN Rahul Arnold APRN       • oxyCODONE-acetaminophen (PERCOCET)  MG per tablet 1 tablet  1 tablet Oral Q4H PRN Rahul Arnold APRN   1 tablet at 10/06/22 2307   • oxyCODONE-acetaminophen (PERCOCET) 7.5-325 MG per tablet 1 tablet  1 tablet Oral Q4H PRN Rahul Arnold APRN   1 tablet at 10/06/22 1701   • pantoprazole (PROTONIX) EC tablet 40 mg  40 mg Oral QAM Rahul Arnold APRN   40 mg at 10/07/22 0813   • polyethylene glycol (MIRALAX) packet 17 g  17 g Oral Daily PRN Rahul Arnold APRN       • sennosides-docusate (PERICOLACE) 8.6-50 MG per tablet 2 tablet  2 tablet Oral BID Rahul Arnold APRN   2 tablet at 10/07/22 0813   • vancomycin 1250 mg/250 mL 0.9% NS IVPB ([facility])  1,250 mg Intravenous Q12H Rahul Arnold APRN   Stopped at 10/07/22 0133   • Vitamin A capsule  24,000 Units  24,000 Units Oral Daily Rahul Arnold APRN   24,000 Units at 10/07/22 0813   • zinc sulfate (ZINCATE) capsule 220 mg  220 mg Oral Daily Rahul Arnold APRN   220 mg at 10/07/22 0813         Assessment & Plan     1. S/p craniotomy    2.  Paroxysmal atrial fibrillation with rapid ventricular response: Not currently anticoagulated due to craniotomy.  He is being discharged home on aspirin 81 mg daily.  We recommend full anticoagulation with Eliquis when felt safe to do so from a neurosurgery standpoint.  -Continue Lopressor 100 mg twice daily for rate control    3.  Nonsustained ventricular tachycardia: He had approximately 12 seconds of nonsustained ventricular tachycardia earlier this morning while asleep.  It appears he was asymptomatic to this  -I have ordered a 14-day live event monitor to be placed prior to discharge  -Start amiodarone-he will receive a 400 mg dose p.o. prior to discharge and will be discharged home on 200 mg twice daily    Discussed with Dr. Sherman     Close follow-up with Dr. Kc in 2 weeks    ADITYA Whitney  10/07/22  14:15 CDT

## 2022-10-07 NOTE — NURSING NOTE
When speaking with Dr. Stein earlier he had stated that if the bloodwork showed a potassium <3.6 or magnesium <1.9 to treat.  Bloodwork showed potassium 3.8 and magnesium 1.9.

## 2022-10-08 NOTE — THERAPY DISCHARGE NOTE
Acute Care - Physical Therapy Discharge Summary  [facility]       Patient Name: Elijah Escobar  : 1955  MRN: [id number]    Today's Date: 10/8/2022                 Admit Date: 10/4/2022      PT Recommendation and Plan    Visit Dx:    ICD-10-CM ICD-9-CM   1. NSVT (nonsustained ventricular tachycardia) ([facility])  I47.2 427.1   2. S/P craniotomy  Z98.890 V45.89   3. Decreased activities of daily living (ADL)  Z78.9 V49.89   4. Impaired mobility  Z74.09 799.89   5. Atrial fibrillation with RVR ([facility])  I48.91 427.31        Outcome Measures     Row Name 10/06/22 1500 10/06/22 1000          How much help from another person do you currently need...    Turning from your back to your side while in flat bed without using bedrails? -- 4  -LH (r) KW (t) LH (c)     Moving from lying on back to sitting on the side of a flat bed without bedrails? -- 3  -LH (r) KW (t) LH (c)     Moving to and from a bed to a chair (including a wheelchair)? -- 3  -LH (r) KW (t) LH (c)     Standing up from a chair using your arms (e.g., wheelchair, bedside chair)? -- 3  -LH (r) KW (t) LH (c)     Climbing 3-5 steps with a railing? -- 3  -LH (r) KW (t) LH (c)     To walk in hospital room? -- 3  -LH (r) KW (t) LH (c)     AM-PAC 6 Clicks Score (PT) -- 19  -LH (r) KW (t)        How much help from another is currently needed...    Putting on and taking off regular lower body clothing? 3  -LS --     Bathing (including washing, rinsing, and drying) 3  -LS --     Toileting (which includes using toilet bed pan or urinal) 3  -LS --     Putting on and taking off regular upper body clothing 4  -LS --     Taking care of personal grooming (such as brushing teeth) 4  -LS --     Eating meals 4  -LS --     AM-PAC 6 Clicks Score (OT) 21  -LS --        Functional Assessment    Outcome Measure Options AM-PAC 6 Clicks Daily Activity (OT)  -LS --           User Key  (r) = Recorded By, (t) = Taken By, (c) = Cosigned By    Initials Name Provider Type    Carlos Huerta,  PT Physical Therapist    Lora Acevedo COTA Occupational Therapist Assistant    Holly Palumbo, [facility] Student PTA Student                     PT Rehab Goals     Row Name 10/08/22 0656             Bed Mobility Goal 1 (PT)    Activity/Assistive Device (Bed Mobility Goal 1, PT) sit to supine/supine to sit  -AB      [place] Level/Cues Needed (Bed Mobility Goal 1, PT) standby assist  -AB      Time Frame (Bed Mobility Goal 1, PT) long term goal (LTG);10 days  -AB      Progress/Outcomes (Bed Mobility Goal 1, PT) goal met  -AB         Transfer Goal 1 (PT)    Activity/Assistive Device (Transfer Goal 1, PT) sit-to-stand/stand-to-sit;bed-to-chair/chair-to-bed  -AB      [place] Level/Cues Needed (Transfer Goal 1, PT) standby assist  -AB      Time Frame (Transfer Goal 1, PT) long term goal (LTG);10 days  -AB      Progress/Outcome (Transfer Goal 1, PT) goal not met  -AB         Gait Training Goal 1 (PT)    Activity/Assistive Device (Gait Training Goal 1, PT) gait (walking locomotion);assistive device use;walker, standard;decrease fall risk;diminish gait deviation;improve balance and speed;increase endurance/gait distance  -AB      [place] Level (Gait Training Goal 1, PT) standby assist  -AB      Distance (Gait Training Goal 1, PT) 300  -AB      Time Frame (Gait Training Goal 1, PT) long term goal (LTG);10 days  -AB      Progress/Outcome (Gait Training Goal 1, PT) goal not met  -AB         Stairs Goal 1 (PT)    Activity/Assistive Device (Stairs Goal 1, PT) ascending stairs;descending stairs;decrease fall risk;improve balance and speed  -AB      [place] Level/Cues Needed (Stairs Goal 1, PT) standby assist  -AB      Number of Stairs (Stairs Goal 1, PT) 2  -AB      Time Frame (Stairs Goal 1, PT) long term goal (LTG);10 days  -AB      Progress/Outcome (Stairs Goal 1, PT) goal not met  -AB            User Key  (r) = Recorded By, (t) = Taken By, (c) = Cosigned By    Initials Name Provider Type Discipline     Anayeli Calle, PTA Physical Therapist Assistant PT                    PT Discharge Summary  Anticipated Discharge Disposition (PT): home with assist, home with home health  Reason for Discharge: Discharge from facility  Outcomes Achieved: Refer to plan of care for updates on goals achieved  Discharge Destination: Home with assist      Anayeli Saez, PTA   10/8/2022

## 2022-10-08 NOTE — THERAPY DISCHARGE NOTE
Acute Care - Occupational Therapy Discharge Summary  [facility]     Patient Name: Eljiah Escobar  : 1955  MRN: [id number]    Today's Date: 10/8/2022                 Admit Date: 10/4/2022        OT Recommendation and Plan    Visit Dx:    ICD-10-CM ICD-9-CM   1. NSVT (nonsustained ventricular tachycardia) ([facility])  I47.2 427.1   2. S/P craniotomy  Z98.890 V45.89   3. Decreased activities of daily living (ADL)  Z78.9 V49.89   4. Impaired mobility  Z74.09 799.89   5. Atrial fibrillation with RVR ([facility])  I48.91 427.31                OT Rehab Goals     Row Name 10/08/22 0700             Transfer Goal 1 (OT)    Activity/Assistive Device (Transfer Goal 1, OT) tub  -TS      [place] Level/Cues Needed (Transfer Goal 1, OT) independent  -TS      Time Frame (Transfer Goal 1, OT) long term goal (LTG);by discharge  -TS      Progress/Outcome (Transfer Goal 1, OT) goal not met  -TS         Dressing Goal 1 (OT)    Activity/Device (Dressing Goal 1, OT) dressing skills, all  -TS      [place]/Cues Needed (Dressing Goal 1, OT) independent  -TS      Time Frame (Dressing Goal 1, OT) long term goal (LTG);by discharge  -TS      Progress/Outcome (Dressing Goal 1, OT) goal not met  -TS         Toileting Goal 1 (OT)    Activity/Device (Toileting Goal 1, OT) toileting skills, all  -TS      [place] Level/Cues Needed (Toileting Goal 1, OT) independent  -TS      Time Frame (Toileting Goal 1, OT) long term goal (LTG);by discharge  -TS      Progress/Outcome (Toileting Goal 1, OT) goal not met  -TS            User Key  (r) = Recorded By, (t) = Taken By, (c) = Cosigned By    Initials Name Provider Type Discipline    Roslyn Muñoz COTA Occupational Therapist Assistant OT                 Outcome Measures     Row Name 10/06/22 1500 10/06/22 1000          How much help from another person do you currently need...    Turning from your back to your side while in flat bed without using bedrails? -- 4  -LH (r) KW (t) LH (c)      Moving from lying on back to sitting on the side of a flat bed without bedrails? -- 3  -LH (r) KW (t) LH (c)     Moving to and from a bed to a chair (including a wheelchair)? -- 3  -LH (r) KW (t) LH (c)     Standing up from a chair using your arms (e.g., wheelchair, bedside chair)? -- 3  -LH (r) KW (t) LH (c)     Climbing 3-5 steps with a railing? -- 3  -LH (r) KW (t) LH (c)     To walk in hospital room? -- 3  -LH (r) KW (t) LH (c)     AM-PAC 6 Clicks Score (PT) -- 19  -LH (r) KW (t)        How much help from another is currently needed...    Putting on and taking off regular lower body clothing? 3  -LS --     Bathing (including washing, rinsing, and drying) 3  -LS --     Toileting (which includes using toilet bed pan or urinal) 3  -LS --     Putting on and taking off regular upper body clothing 4  -LS --     Taking care of personal grooming (such as brushing teeth) 4  -LS --     Eating meals 4  -LS --     AM-PAC 6 Clicks Score (OT) 21  -LS --        Functional Assessment    Outcome Measure Options AM-PAC 6 Clicks Daily Activity (OT)  -LS --           User Key  (r) = Recorded By, (t) = Taken By, (c) = Cosigned By    Initials Name Provider Type     Carlos Danielle, PT Physical Therapist    Lora Acevedo COTA Occupational Therapist Assistant    Holly Palumbo PTA Student PTA Student                        OT Discharge Summary  Anticipated Discharge Disposition (OT): home with assist  Reason for Discharge: Discharge from facility  Outcomes Achieved: Refer to plan of care for updates on goals achieved  Discharge Destination: Home with assist      AJ Chandler  10/8/2022

## 2022-10-08 NOTE — OUTREACH NOTE
Prep Survey    Flowsheet Row Responses   [facility] facility patient discharged from? [place]   Is LACE score < 7 ? No   Emergency Room discharge w/ pulse ox? No   Eligibility Readm Mgmt   Discharge diagnosis s/p CRANIOPLASTY right with Lumbar drain   Does the patient have one of the following disease processes/diagnoses(primary or secondary)? General Surgery   Does the patient have Home health ordered? Yes   What is the Home health agency?  [facility]   Is there a DME ordered? No   Prep survey completed? Yes          CARMELITA OLMEDO - Registered Nurse         Detail Level: Simple

## 2022-10-10 ENCOUNTER — READMISSION MANAGEMENT (OUTPATIENT)
Dept: CALL CENTER | Facility: HOSPITAL | Age: 67
End: 2022-10-10

## 2022-10-10 NOTE — OUTREACH NOTE
General Surgery Week 1 Survey    Flowsheet Row Responses   [facility] facility patient discharged from? [place]   Does the patient have one of the following disease processes/diagnoses(primary or secondary)? General Surgery   Week 1 attempt successful? No   Unsuccessful attempts Attempt 1          MIGUEL FRANCOIS - Registered Nurse

## 2022-10-10 NOTE — PAYOR COMM NOTE
"DC HOME 10-7-22    [id number]   [phone]      Virgil Escobar (66 y.o. Male)     Date of Birth   1955    Social Security Number       Address   [address] [place] [id number]    Home Phone   [phone]    MRN   [id number]       Mandaeism   Non-Presybeterian    Marital Status                               Admission Date   10/4/22    Admission Type   Elective    Admitting Provider   Flaco Portillo MD    Attending Provider       Department, Room/Bed   [facility] 3A, 334/1       Discharge Date   10/7/2022    Discharge Disposition   Home or Self Care    Discharge Destination                               Attending Provider: (none)   Allergies: No Known Allergies    Isolation: None   Infection: None   Code Status: Prior    Ht: 177.8 cm (70\")   Wt: 117 kg (257 lb 14.4 oz)    Admission Cmt: None   Principal Problem: S/P craniotomy [Z98.890]                 Active Insurance as of 10/4/2022     Primary Coverage     Payor Plan Insurance Group Employer/Plan Group    ANTH MEDICARE REPLACEMENT ANTH MEDICARE ADVANTAGE [id number]     Payor Plan Address Payor Plan Phone Number Payor Plan Fax Number Effective Dates    [address] [phone]  1/1/2022 - None Entered    [facility] [address]       Subscriber Name Subscriber Birth Date Member ID       VIRGIL ESCOBAR 1955 [id number]                 Emergency Contacts      (Rel.) Home Phone Work Phone Mobile Phone    Cathy Guzmán (Son) -- -- [phone]    Manju Lua (Relative) -- -- [phone]    ESCOBARMAYTE CARO (Spouse) [phone] -- --    david lua (Sister) [phone] -- --    josie benavides (Sister) -- -- [phone]               Discharge Summary      Rahul Arnold APRN at 10/07/22 0911     Attestation signed by Flaco Portillo MD at 10/08/22 1400    I have seen and examined the above patient in conjunction with REINALDO Gallego. I agree developed the therapeutic plan and agree " with the above documentation.    PAUL Portillo MD, PhD, MPH  Neurosurgery              DISCHARGE SUMMARY FROM HOSPITAL    Date of Discharge:  10/7/2022    Presenting Diagnosis/History of Present Illness  S/P craniotomy [Z98.890]  History of cranioplasty [Z98.890]    Medical History   Patient Active Problem List   Diagnosis   • Meningioma s/p craniotomy   • Hypertension   • GERD (gastroesophageal reflux disease)   • Coronary artery disease   • Class 2 severe obesity due to excess calories with serious comorbidity and body mass index (BMI) of 38.0 to 38.9 in adult ([facility]ly [facility])   • Type 2 diabetes mellitus with hyperglycemia and peripheral neuropathy, with long-term current use of insulin ([facility]ly [facility])   • Leukocytosis   • Other specified anemias   • Paroxysmal atrial fibrillation with rapid ventricular response ([facility])   • Atrial fibrillation with RVR ([facility])   • Post-operative infection   • S/P craniotomy   • Smoker   • History of cranioplasty     Discharge Diagnosis/ Active Hospital Problems:   Active Hospital Problems    Diagnosis    • **S/P craniotomy      Added automatically from request for surgery [id number]     • History of cranioplasty    • Smoker    • Paroxysmal atrial fibrillation with rapid ventricular response ([facility])    • Hypertension    • Coronary artery disease    • Class 2 severe obesity due to excess calories with serious comorbidity and body mass index (BMI) of 38.0 to 38.9 in adult ([facility])    • Type 2 diabetes mellitus with hyperglycemia and peripheral neuropathy, with long-term current use of insulin ([facility]ly [facility])      Hospital Course  Patient is a 66 y.o. male presented with a significant medical history of coronary artery disease, diabetes, erectile dysfunction, GERD, hypertension, hyperlipidemia. He presents with a new problem of 2 episodes of intermittent amnesia suggestive of seizures. Physical exam findings of left pronator drift and dysmetria with left finger-nose otherwise neurologically intact.  His imaging shows  avidly contrast-enhancing meningeal based mass with dural tails measuring 2.8 x 2.8 x 2.0 cm with surrounding FLAIR signal.      On 10/4/2022 the patient was brought to the main operating room where he underwent an uneventful cranioplasty.  Postoperatively he  did extremely well and his neurological exam remained unchanged.  He was kept in the hospital for close neurologic monitoring, airway observation, and for pain control.  He has been able to ambulate, tolerate oral diet, oral pain medications and void.   He has been evaluated by physical therapy and occupational therapy and deemed safe for discharge home with family to continue PT/OT per home health.   Postoperative education was performed at bedside which included wound care instructions, maximal physical activity, use of postoperative pain medication including tapering, and indications for contacting the neurosurgical office vs the emergency department.  Arrangements for postoperative follow-up should be provided prior to hospital discharge.  He will be provided with an appropriate course of oral medication for pain control.  Additional information provided in hospital discharge instructions.    Procedures Performed  Procedure(s):  CRANIOPLASTY right with Lumbar drain     Consults:   Consults     Date and Time Order Name Status Description    10/6/2022  5:27 PM Inpatient Hospitalist Consult Completed     10/5/2022  8:29 PM Inpatient Cardiology Consult          Pertinent Test Results:   CT Head Without Contrast    Result Date: 10/4/2022  1. The intervertebral right frontal cranioplasty. 2. Right frontal vasogenic edema and encephalomalacia. 3. A 1.7 cm shift to the left which was not seen in the previous study. 4. A mild increase in right frontotemporal extra-axial fluid accumulation adjacent and posterior to the cranioplasty. 5. Right frontoparietal and temporal subgaleal fluid and air and subgaleal drain in place. 6. A new left frontal subgaleal fluid and  air which may be postsurgical.          This report was finalized on 10/04/2022 15:27 by Dr. Michelle Vigil MD.    XR Chest 1 View    Result Date: 9/26/2022   No acute findings. This report was finalized on 09/26/2022 14:21 by Dr. Mk Chinchilla MD.    CBC:   Results from last 7 days   Lab Units 10/07/22  0410 10/06/22  0513 10/05/22  0555   WBC 10*3/mm3 11.31* 13.17* 14.28*   HEMOGLOBIN g/dL 12.6* 11.9* 12.4*   HEMATOCRIT % 38.6 37.5 38.9   PLATELETS 10*3/mm3 194 174 203     BMP:  Results from last 7 days   Lab Units 10/07/22  0410 10/06/22  0513 10/05/22  0723   SODIUM mmol/L 142 138 135*   POTASSIUM mmol/L 3.8 4.1 4.9   CHLORIDE mmol/L 105 104 102   CO2 mmol/L 25.0 22.0 18.0*   BUN mg/dL 13 17 21   CREATININE mg/dL 0.84 0.80 0.88   GLUCOSE mg/dL 300* 301* 329*   CALCIUM mg/dL 8.7 8.5* 8.2*     Culture Results: No results found for: BLOODCX, URINECX, WOUNDCX, MRSACX, RESPCX, STOOLCX    Condition on Discharge:  Stable    Vital Signs  Temp:  [98.3 °F (36.8 °C)-98.6 °F (37 °C)] 98.4 °F (36.9 °C)  Heart Rate:  [] 54  Resp:  [18] 18  BP: (121-139)/(63-79) 132/70    Physical Exam:   Physical Exam  Vitals and nursing note reviewed.   Constitutional:       General: He is not in acute distress.     Appearance: Normal appearance. He is well-developed and well-groomed. He is obese. He is not ill-appearing, toxic-appearing or diaphoretic.      Comments: BMI 37.00   HENT:      Head: Normocephalic and atraumatic.        Right Ear: Hearing normal.      Left Ear: Hearing normal.   Eyes:      Extraocular Movements: EOM normal.      Conjunctiva/sclera: Conjunctivae normal.      Pupils: Pupils are equal, round, and reactive to light.   Neck:      Trachea: Trachea normal.   Cardiovascular:      Rate and Rhythm: Normal rate and regular rhythm.   Pulmonary:      Effort: Pulmonary effort is normal. No tachypnea, bradypnea, accessory muscle usage or respiratory distress.   Abdominal:      Palpations: Abdomen is soft.    Musculoskeletal:      Cervical back: Full passive range of motion without pain and neck supple.   Skin:     General: Skin is warm and dry.   Neurological:      Mental Status: He is alert and oriented to person, place, and time.      GCS: GCS eye subscore is 4. GCS verbal subscore is 5. GCS motor subscore is 6.   Psychiatric:         Speech: Speech normal.         Behavior: Behavior normal. Behavior is cooperative.          Neurologic Exam     Mental Status   Oriented to person, place, and time.   Attention: normal. Concentration: normal.   Speech: speech is normal   Level of consciousness: alert    Bright and awake.  Oriented x3.  Follows commands without prompting showing thumbs up and 2 fingers bilaterally.     Cranial Nerves     CN II   Visual fields full to confrontation.     CN III, IV, VI   Pupils are equal, round, and reactive to light.  Extraocular motions are normal.     CN V   Facial sensation intact.     CN VII   Facial expression full, symmetric.     CN VIII   CN VIII normal.     CN IX, X   CN IX normal.     CN XI   CN XI normal.     Motor Exam   Right arm tone: normal  Left arm tone: normal  Right arm pronator drift: absent  Left arm pronator drift: absent  Right leg tone: normal  Left leg tone: normal    Strength   Right deltoid: 5/5  Left deltoid: 5/5  Right biceps: 5/5  Left biceps: 5/5  Right triceps: 5/5  Left triceps: 5/5  Right wrist extension: 5/5  Left wrist extension: 5/5  Right iliopsoas: 5/5  Left iliopsoas: 5/5  Right quadriceps: 5/5  Left quadriceps: 5/5  Right anterior tibial: 5/5  Left anterior tibial: 5/5  Right gastroc: 5/5  Left gastroc: 5/5  Right EHL 5/5  Left EHL 5/5       Sensory Exam   Right arm light touch: normal  Left arm light touch: normal  Right leg light touch: normal  Left leg light touch: normal    Gait, Coordination, and Reflexes     Tremor   Resting tremor: absent  Intention tremor: absent  Action tremor: absent    Discharge Disposition  Home or Self  Care    Discharge Medications     Discharge Medications      New Medications      Instructions Start Date   ascorbic acid 500 MG tablet  Commonly known as: VITAMIN C   500 mg, Oral, Daily   Start Date: October 8, 2022     sennosides-docusate 8.6-50 MG per tablet  Commonly known as: PERICOLACE   1 tablet, Oral, Daily      Vitamin A 2400 MCG (8000 UT) capsule   24,000 Units, Oral, Daily   Start Date: October 8, 2022     zinc sulfate 220 (50 Zn) MG capsule  Commonly known as: ZINCATE   220 mg, Oral, Daily   Start Date: October 8, 2022        Changes to Medications      Instructions Start Date   HYDROcodone-acetaminophen  MG per tablet  Commonly known as: COLIN  What changed: You were already taking a medication with the same name, and this prescription was added. Make sure you understand how and when to take each.   1 tablet, Oral, Every 6 Hours PRN      HYDROcodone-acetaminophen 7.5-325 MG per tablet  Commonly known as: COLIN  What changed: These instructions start on October 10, 2022. If you are unsure what to do until then, ask your doctor or other care provider.   1 tablet, Oral, Every 6 Hours PRN   Start Date: October 10, 2022     insulin detemir 100 UNIT/ML injection  Commonly known as: LEVEMIR  What changed: how much to take   30 Units, Subcutaneous, Nightly         Continue These Medications      Instructions Start Date   acetaminophen 325 MG tablet  Commonly known as: TYLENOL   650 mg, Oral, Every 4 Hours PRN      amLODIPine 10 MG tablet  Commonly known as: NORVASC   10 mg, Oral, Every 24 Hours Scheduled      aspirin 81 MG chewable tablet   81 mg, Oral, Daily      atorvastatin 20 MG tablet  Commonly known as: LIPITOR   20 mg, Oral, Nightly      butalbital-acetaminophen-caffeine -40 MG per tablet  Commonly known as: FIORICET, ESGIC   1 tablet, Oral, Every 6 Hours PRN      dextrose 40 % gel  Commonly known as: GLUTOSE   15 g, Oral, Every 15 Minutes PRN      gabapentin 300 MG capsule  Commonly known  as: NEURONTIN   300 mg, Oral, 3 Times Daily, As needed      glucagon (human recombinant) 1 MG injection  Commonly known as: GLUCAGEN DIAGNOSTIC   1 mg, Intramuscular, Every 15 Minutes PRN      Insulin Lispro 100 UNIT/ML injection  Commonly known as: humaLOG   0-14 Units, Subcutaneous, 4 Times Daily Before Meals & Nightly      isosorbide mononitrate 60 MG 24 hr tablet  Commonly known as: IMDUR   60 mg, Oral, Every 24 Hours Scheduled      levETIRAcetam 500 MG tablet  Commonly known as: KEPPRA   500 mg, Oral, 2 Times Daily      lisinopril 20 MG tablet  Commonly known as: PRINIVIL,ZESTRIL   20 mg, Oral, Daily      metoprolol tartrate 50 MG tablet  Commonly known as: LOPRESSOR   50 mg, Oral, Every 12 Hours Scheduled      nicotine 14 MG/24HR patch  Commonly known as: NICODERM CQ   1 patch, Transdermal, Daily PRN      omeprazole 20 MG capsule  Commonly known as: priLOSEC   20 mg, Oral, Daily      vitamin D 1.25 MG (64141 UT) capsule capsule  Commonly known as: ERGOCALCIFEROL   50,000 Units, Oral, Weekly           Discharge Diet:   Diet Instructions     Advance Diet As Tolerated           Activity at Discharge:   Activity Instructions     Activity as Tolerated      Bathing Restrictions      Type of Restriction: Bathing    Bathing Restrictions: Other    Explain Bathing Restrictions: It is very important to shower daily.  Do not scrub incisions.  Make all attempts to avoid wearing hats or touching incision.    Driving Restrictions      Type of Restriction: Driving    Driving Restrictions: No Driving While Taking Narcotics    Gradually Increase Activity Until at Pre-Hospitalization Level      Lifting Restrictions      Type of Restriction: Lifting    Lifting Restrictions: Lifting Restriction (Indicate Limit)    Weight Limit (Pounds): 8    Length of Lifting Restriction: 2-3 WEEKS         Follow-up Appointments  No future appointments.  Additional Instructions for the Follow-ups that You Need to Schedule     Ambulatory Referral  to Home Health   As directed      Face to Face Visit Date: 10/7/2022    Follow-up provider for Plan of Care?: I will be treating the patient on an ongoing basis.  Please send me the Plan of Care for signature.    Follow-up provider: JIMENEZ KATZ [[id number]]    Reason/Clinical Findings: Postop cranioplasty.  Remote history of postop infection.    Describe mobility limitations that make leaving home difficult: Knowledge deficit, imbalance and ADLs below baseline.    Nursing/Therapeutic Services Requested: Skilled Nursing Other Physical Therapy Occupational Therapy    Skilled nursing orders: Other (Wound care monitoring)    PT orders: Therapeutic exercise Gait Training Strengthening    Weight Bearing Status: As Tolerated    Occupational orders: Activities of daily living Home safety assessment    Frequency: 1 Week 1         Call MD With Problems / Concerns   As directed      Instructions: CALL WITH ANY NEW OR ADDITIONAL CONCERNS.    Order Comments: Instructions: CALL WITH ANY NEW OR ADDITIONAL CONCERNS.          Discharge Follow-up with Specified Provider: Dr. Katz; 6 Weeks   As directed      To: Dr. Katz    Follow Up: 6 Weeks    Follow Up Details: 6-8 WEEKS         Discharge Follow-up with Specified Provider: ADITYA Gallego; 2 Weeks   As directed      To: ADITYA Gallego    Follow Up: 2 Weeks             Test Results Pending at Discharge  Pending Labs     Order Current Status    Culture, CSF - Cerebrospinal Fluid, Brain Preliminary result        ADITYA Cespedes  10/07/22  09:11 CDT    [id number]      Electronically signed by Jimenez Katz MD at 10/08/22 1400

## 2022-10-17 ENCOUNTER — READMISSION MANAGEMENT (OUTPATIENT)
Dept: CALL CENTER | Facility: HOSPITAL | Age: 67
End: 2022-10-17

## 2022-11-07 RX ORDER — AMIODARONE HYDROCHLORIDE 200 MG/1
TABLET ORAL
Qty: 60 TABLET | Refills: 0 | OUTPATIENT
Start: 2022-11-07

## 2022-11-16 ENCOUNTER — APPOINTMENT (OUTPATIENT)
Dept: CT IMAGING | Facility: HOSPITAL | Age: 67
End: 2022-11-16

## 2022-11-25 ENCOUNTER — APPOINTMENT (OUTPATIENT)
Dept: CT IMAGING | Facility: HOSPITAL | Age: 67
End: 2022-11-25

## 2022-11-25 ENCOUNTER — HOSPITAL ENCOUNTER (EMERGENCY)
Facility: HOSPITAL | Age: 67
Discharge: HOME | End: 2022-11-25
Attending: STUDENT IN AN ORGANIZED HEALTH CARE EDUCATION/TRAINING PROGRAM | Admitting: STUDENT IN AN ORGANIZED HEALTH CARE EDUCATION/TRAINING PROGRAM

## 2022-11-25 VITALS
HEART RATE: 84 BPM | SYSTOLIC BLOOD PRESSURE: 137 MMHG | TEMPERATURE: 98.2 F | OXYGEN SATURATION: 99 % | WEIGHT: 170 LBS | BODY MASS INDEX: 24.34 KG/M2 | DIASTOLIC BLOOD PRESSURE: 77 MMHG | HEIGHT: 70 IN | RESPIRATION RATE: 19 BRPM

## 2022-11-25 DIAGNOSIS — R22.0 FACIAL SWELLING: Primary | ICD-10-CM

## 2022-11-25 DIAGNOSIS — Z98.890 HISTORY OF CRANIOTOMY: ICD-10-CM

## 2022-11-25 DIAGNOSIS — Z87.898 HISTORY OF BRAIN TUMOR: ICD-10-CM

## 2022-11-25 PROCEDURE — 70450 CT HEAD/BRAIN W/O DYE: CPT

## 2022-11-25 PROCEDURE — 99283 EMERGENCY DEPT VISIT LOW MDM: CPT

## 2022-11-25 NOTE — ED NOTES
Call light answered.  Patient requesting to go home as he has an appointment in [place] at 3pm that he would like to try to make it to.  Message sent to Dr Parra at this time

## 2022-11-25 NOTE — DISCHARGE INSTRUCTIONS
Today you are seen for your symptoms your CT was reassuring.  I discussed it with our on-call neurosurgeon.  They stated that you administer outpatient follow-up ointments they really want to see you in the clinic so please call and schedule close follow-up appointment with your prior neurosurgeon.  If your symptoms worsen prior please return the emergency department immediately.

## 2022-11-25 NOTE — ED PROVIDER NOTES
EMERGENCY DEPARTMENT HISTORY AND PHYSICAL EXAM    Patient Name: Elijah Escobar    Chief Complaint   Patient presents with   • Eye Problem       History of Presenting Illness:  Elijah Escobar is a 67 y.o. male with prior history of brain tumor resection status post initial craniotomy followed by bone flap revision about 1 month prior presents to the emergency department due to some swelling to his left frontal forehead.    Patient states that he has had a sensation of some frontal forehead swelling mostly of his left feels like his upper eyelid is swollen.  He is worried that he has had some complication of his prior surgery.  Denies any vision changes no hearing changes no new difficulty walking.  No new numbness weakness or tingling his arms or legs.  Denies difficulty speaking.  No recent falls or trauma.  Denies any fevers or chills or chest pain or shortness of breath or nausea vomiting or abdominal pain.    Past Medical History:   Past Medical History:   Diagnosis Date   • Brain tumor (HCC)    • Coronary artery disease    • COVID-19 vaccine series completed     MODERNA X 3; LAST DOSE 3/2022   • Diabetes (HCC)    • Erectile dysfunction    • GERD (gastroesophageal reflux disease)    • Hypercholesteremia    • Hypertension        Past Surgical History:  Past Surgical History:   Procedure Laterality Date   • BALLOON ANGIOPLASTY, ARTERY Right    • COLONOSCOPY     • CRANIECTOMY Right 7/1/2022    Procedure: CRANIECTOMY WITH INCISIONAL WASHOUT;  Surgeon: Felipe Banerjee MD;  Location:  [facility];  Service: Neurosurgery;  Laterality: Right;   • CRANIOPLASTY Right 10/4/2022    Procedure: CRANIOPLASTY right with Lumbar drain;  Surgeon: Flaco Portillo MD;  Location:  [facility];  Service: Neurosurgery;  Laterality: Right;   • CRANIOTOMY FOR TUMOR Right 6/16/2022    Procedure: CRANIOTOMY FOR TUMOR STERIOTACTIC WITH BRAIN LAB, right;  Surgeon: Flaco Portillo MD;  Location:  [facility];  Service: Neurosurgery;   Laterality: Right;       Social History:   Daily tobacco  Denies EtOH  Denies marijuana, cocaine, or IV drugs    Allergies:   No Known Allergies    Medications:   No current facility-administered medications for this encounter.    Current Outpatient Medications:   •  acetaminophen (TYLENOL) 325 MG tablet, Take 2 tablets by mouth Every 4 (Four) Hours As Needed for Mild Pain ., Disp: , Rfl:   •  amiodarone (PACERONE) 200 MG tablet, Take 1 tablet by mouth 2 (Two) Times a Day., Disp: 60 tablet, Rfl: 0  •  amLODIPine (NORVASC) 10 MG tablet, Take 1 tablet by mouth Daily., Disp: , Rfl:   •  aspirin 81 MG chewable tablet, Chew 1 tablet Daily., Disp: , Rfl:   •  atorvastatin (LIPITOR) 20 MG tablet, Take 20 mg by mouth Every Night., Disp: , Rfl:   •  butalbital-acetaminophen-caffeine (FIORICET, ESGIC) -40 MG per tablet, Take 1 tablet by mouth Every 6 (Six) Hours As Needed for Headache., Disp: , Rfl:   •  dextrose (GLUTOSE) 40 % gel, Take 15 g by mouth Every 15 (Fifteen) Minutes As Needed for Low Blood Sugar (Blood sugar less than 70). (Patient not taking: No sig reported), Disp: , Rfl:   •  gabapentin (NEURONTIN) 300 MG capsule, Take 300 mg by mouth 3 (Three) Times a Day. As needed, Disp: , Rfl:   •  glucagon, human recombinant, (GLUCAGEN DIAGNOSTIC) 1 MG injection, Inject 1 mg into the appropriate muscle as directed by prescriber Every 15 (Fifteen) Minutes As Needed (Blood Glucose Less Than 70) for up to 360 days. (Patient not taking: No sig reported), Disp: , Rfl:   •  HYDROcodone-acetaminophen (NORCO) 7.5-325 MG per tablet, Take 1 tablet by mouth Every 6 (Six) Hours As Needed for Moderate Pain., Disp: , Rfl: 0  •  insulin detemir (LEVEMIR) 100 UNIT/ML injection, Inject 30 Units under the skin into the appropriate area as directed Every Night. (Patient taking differently: Inject 35 Units under the skin into the appropriate area as directed Every Night.), Disp: , Rfl: 12  •  Insulin Lispro (humaLOG) 100 UNIT/ML  "injection, Inject 0-14 Units under the skin into the appropriate area as directed 4 (Four) Times a Day Before Meals & at Bedtime., Disp: , Rfl: 12  •  isosorbide mononitrate (IMDUR) 60 MG 24 hr tablet, Take 1 tablet by mouth Daily., Disp: , Rfl:   •  levETIRAcetam (KEPPRA) 500 MG tablet, Take 500 mg by mouth 2 (Two) Times a Day., Disp: , Rfl:   •  lisinopril (PRINIVIL,ZESTRIL) 20 MG tablet, Take 20 mg by mouth Daily., Disp: , Rfl:   •  metoprolol tartrate (LOPRESSOR) 100 MG tablet, Take 1 tablet by mouth Every 12 (Twelve) Hours., Disp: 60 tablet, Rfl: 11  •  nicotine (NICODERM CQ) 14 MG/24HR patch, Place 1 patch on the skin as directed by provider Daily As Needed (Smoker withdrawal)., Disp: , Rfl:   •  omeprazole (priLOSEC) 20 MG capsule, Take 20 mg by mouth Daily., Disp: , Rfl:   •  sennosides-docusate (senna-docusate sodium) 8.6-50 MG per tablet, Take 1 tablet by mouth Daily., Disp: 60 tablet, Rfl: 0  •  vitamin D (ERGOCALCIFEROL) 1.25 MG (91289 UT) capsule capsule, Take 50,000 Units by mouth 1 (One) Time Per Week., Disp: , Rfl:     Review of Systems:  A full review of systems was obtained and is negative unless otherwise stated in HPI.    Physical Exam:   VS: /77 (BP Location: Left arm, Patient Position: Sitting)   Pulse 84   Temp 98.2 °F (36.8 °C)   Resp 19   Ht 177.8 cm (70\")   Wt 77.1 kg (170 lb)   SpO2 99%   BMI 24.39 kg/m²   GENERAL: Well-appearing middle-age gentleman sitting up in stretcher no acute distress; well nourished, well developed, awake, alert, no acute distress, nontoxic appearing, comfortable  EYES: PERRL, sclera anicteric, extra-occular movements grossly intact, symmetric lids; subtle increase swelling of the left upper lid compared to the right  EARS, NOSE, MOUTH, THROAT: atraumatic external nose and ears, moist mucous membranes  NECK: Symmetric, trachea midline, no thyromegaly, no adenopathy, no meningismus  RESPIRATORY: Unlabored respiratory effort, clear to auscultation " bilaterally, good air movement  CARDIOVASCULAR: No murmurs or gallops, peripheral pulses 2+ and equal in all extremities  GI: Soft, nontender, nondistended, bowel sounds present, no hepatosplenomegaly  LYMPHATIC: no lymphadenopathy  MUSCULOSKELETAL/EXTREMITIES: Extremities without obvious deformity, no cyanosis or clubbing  SKIN: warm and dry with no obvious rashes  NEUROLOGIC: moving all 4 extremities symmetrically, CN II-XII grossly intact; no ataxic gait; 5 full-strength bilateral wrist bilateral ankle; equal sensation distal hands distal feet bilaterally  PSYCHIATRIC: alert, pleasant and cooperative. Appropriate mood and affect.      Radiology:   CT Head Without Contrast   Final Result   1. Postop changes in the right frontal region as before, this includes   partial right frontal craniectomy with placement of a skull prosthesis.   Deep to the prosthesis there is a mixed attenuation fluid collection,   likely subdural in location, with maximum thickness of 9.6 mm. This   collection has a component of increased attenuation which may represent   a small amount of acute versus subacute subdural hemorrhage, with a   maximum thickness of up to 4 mm. A peripheral hemorrhage or mass effect   is identified.       This report was finalized on 11/25/2022 13:20 by Dr. Elijah Grover MD.            Medical Decision Making:  Elijah Escobar is a 67 y.o. male with prior history of craniotomy presents emerged part due to concerns for facial swelling.    Exam relatively reassuring only the slightest subtle frontal scalp swelling on the left.  GCS 15 no neurologic symptoms otherwise.    Reassuring vital signs patient afebrile.    Imaging was ordered and reviewed.  CT head with postoperative changes.  Complex collection unclear exact etiology.  Radiology.    Nursing notes were reviewed.    The case was discussed with the on-call neurosurgeon, Dr. Banerjee, reviewed the patient's images.  He stated that actually improved on his  prior CT scan reassuring and no need for additional work-up.  Recommended outpatient follow-up but stated that patient did miss his last few outpatient follow-up appointments including his postoperative appointment.    Unclear exact etiology the patient's symptoms.  Given his CT imaging with neurosurgery recommendations I have no suspicion that this is due to any infectious etiology patient otherwise with no neurologic symptoms afebrile no concern for meningitis.  Patient with no new trauma or strike to his head  For acute subdural subarachnoid or epidural hemorrhage and based on neurosurgery evaluation of imaging I have low suspicion that this is in any way due to bleeding.  Given his prior history of craniotomy as well as bone flap I will consider CSF leak as well as hygroma but no clear evidence.  No significant facial swelling may be concerning for allergic reaction or infectious process.    Patient was discharged home with plan to follow-up with primary care provider as well as neurosurgery as an outpatient return the emergency department immediately if symptoms worsen.      ED Diagnosis:  Facial swelling; History of craniotomy; History of brain tumor      Disposition: to home    Follow up plan: PCP follow up within 2 days, neurosurgery within 1 week, return to ED immediately if symptoms worsen        Signed:  Awais Parra MD  Emergency Medicine Physician    Please note that portions of this note were completed with a voice recognition program.      Awais Parra MD  11/25/22 [id number]

## 2022-11-25 NOTE — ED NOTES
While cleaning the room notice that patient left his cane here.  This RN called him on the phone and he requests that we leave it up front at the desk with his name on it and he will come back and get it when he comes to his next appointment

## 2022-12-05 ENCOUNTER — LAB (OUTPATIENT)
Dept: LAB | Facility: HOSPITAL | Age: 67
End: 2022-12-05

## 2022-12-05 ENCOUNTER — OFFICE VISIT (OUTPATIENT)
Dept: NEUROSURGERY | Facility: CLINIC | Age: 67
End: 2022-12-05

## 2022-12-05 VITALS — WEIGHT: 261 LBS | HEIGHT: 70 IN | BODY MASS INDEX: 37.37 KG/M2

## 2022-12-05 DIAGNOSIS — T81.40XD POSTOPERATIVE INFECTION, UNSPECIFIED TYPE, SUBSEQUENT ENCOUNTER: Primary | ICD-10-CM

## 2022-12-05 DIAGNOSIS — I62.9: ICD-10-CM

## 2022-12-05 DIAGNOSIS — F17.200 SMOKER: ICD-10-CM

## 2022-12-05 DIAGNOSIS — E66.01 CLASS 2 SEVERE OBESITY DUE TO EXCESS CALORIES WITH SERIOUS COMORBIDITY AND BODY MASS INDEX (BMI) OF 38.0 TO 38.9 IN ADULT: ICD-10-CM

## 2022-12-05 DIAGNOSIS — I97.89: ICD-10-CM

## 2022-12-05 DIAGNOSIS — T81.40XD POSTOPERATIVE INFECTION, UNSPECIFIED TYPE, SUBSEQUENT ENCOUNTER: ICD-10-CM

## 2022-12-05 DIAGNOSIS — D32.9 MENINGIOMA: ICD-10-CM

## 2022-12-05 LAB
BASOPHILS # BLD AUTO: 0.08 10*3/MM3 (ref 0–0.2)
BASOPHILS NFR BLD AUTO: 0.7 % (ref 0–1.5)
CRP SERPL-MCNC: 0.32 MG/DL (ref 0–0.5)
DEPRECATED RDW RBC AUTO: 45.8 FL (ref 37–54)
EOSINOPHIL # BLD AUTO: 0.12 10*3/MM3 (ref 0–0.4)
EOSINOPHIL NFR BLD AUTO: 1.1 % (ref 0.3–6.2)
ERYTHROCYTE [DISTWIDTH] IN BLOOD BY AUTOMATED COUNT: 13 % (ref 12.3–15.4)
ERYTHROCYTE [SEDIMENTATION RATE] IN BLOOD: 23 MM/HR (ref 0–20)
HCT VFR BLD AUTO: 44.8 % (ref 37.5–51)
HGB BLD-MCNC: 14.1 G/DL (ref 13–17.7)
IMM GRANULOCYTES # BLD AUTO: 0.06 10*3/MM3 (ref 0–0.05)
IMM GRANULOCYTES NFR BLD AUTO: 0.5 % (ref 0–0.5)
LYMPHOCYTES # BLD AUTO: 3.9 10*3/MM3 (ref 0.7–3.1)
LYMPHOCYTES NFR BLD AUTO: 34.7 % (ref 19.6–45.3)
MCH RBC QN AUTO: 30.1 PG (ref 26.6–33)
MCHC RBC AUTO-ENTMCNC: 31.5 G/DL (ref 31.5–35.7)
MCV RBC AUTO: 95.7 FL (ref 79–97)
MONOCYTES # BLD AUTO: 0.61 10*3/MM3 (ref 0.1–0.9)
MONOCYTES NFR BLD AUTO: 5.4 % (ref 5–12)
NEUTROPHILS NFR BLD AUTO: 57.6 % (ref 42.7–76)
NEUTROPHILS NFR BLD AUTO: 6.46 10*3/MM3 (ref 1.7–7)
NRBC BLD AUTO-RTO: 0 /100 WBC (ref 0–0.2)
PLATELET # BLD AUTO: 224 10*3/MM3 (ref 140–450)
PMV BLD AUTO: 11.1 FL (ref 6–12)
RBC # BLD AUTO: 4.68 10*6/MM3 (ref 4.14–5.8)
WBC NRBC COR # BLD: 11.23 10*3/MM3 (ref 3.4–10.8)

## 2022-12-05 PROCEDURE — 85025 COMPLETE CBC W/AUTO DIFF WBC: CPT

## 2022-12-05 PROCEDURE — 99024 POSTOP FOLLOW-UP VISIT: CPT | Performed by: NEUROLOGICAL SURGERY

## 2022-12-05 PROCEDURE — 86140 C-REACTIVE PROTEIN: CPT

## 2022-12-05 PROCEDURE — 85652 RBC SED RATE AUTOMATED: CPT

## 2022-12-05 PROCEDURE — 36415 COLL VENOUS BLD VENIPUNCTURE: CPT

## 2022-12-05 NOTE — PROGRESS NOTES
Chief Complaint   Patient presents with   • s/p crani     Patient here for follow up today with lab work.        HPI:   Interval History: Elijah returns today for follow-up regarding cervical meningioma with postop wound infection and subsequent cranioplasty.  He is doing well since I saw him last.  No evidence of purulent drainage from the wound.  He did have some swelling in the temporal region.  This is since resolved.  No headaches.  No seizure-like activity.  Cognition is at baseline per himself to the appointment today.  Otherwise doing well.  No fevers at home.  Remains off of antibiotics.    Current Outpatient Medications   Medication Sig Dispense Refill   • acetaminophen (TYLENOL) 325 MG tablet Take 2 tablets by mouth Every 4 (Four) Hours As Needed for Mild Pain .     • amiodarone (PACERONE) 200 MG tablet Take 1 tablet by mouth 2 (Two) Times a Day. 60 tablet 0   • amLODIPine (NORVASC) 10 MG tablet Take 1 tablet by mouth Daily.     • aspirin 81 MG chewable tablet Chew 1 tablet Daily.     • atorvastatin (LIPITOR) 20 MG tablet Take 20 mg by mouth Every Night.     • butalbital-acetaminophen-caffeine (FIORICET, ESGIC) -40 MG per tablet Take 1 tablet by mouth Every 6 (Six) Hours As Needed for Headache.     • dextrose (GLUTOSE) 40 % gel Take 15 g by mouth Every 15 (Fifteen) Minutes As Needed for Low Blood Sugar (Blood sugar less than 70). (Patient not taking: No sig reported)     • gabapentin (NEURONTIN) 300 MG capsule Take 300 mg by mouth 3 (Three) Times a Day. As needed     • glucagon, human recombinant, (GLUCAGEN DIAGNOSTIC) 1 MG injection Inject 1 mg into the appropriate muscle as directed by prescriber Every 15 (Fifteen) Minutes As Needed (Blood Glucose Less Than 70) for up to 360 days. (Patient not taking: No sig reported)     • HYDROcodone-acetaminophen (NORCO) 7.5-325 MG per tablet Take 1 tablet by mouth Every 6 (Six) Hours As Needed for Moderate Pain.  0   • insulin detemir (LEVEMIR) 100 UNIT/ML  "injection Inject 30 Units under the skin into the appropriate area as directed Every Night. (Patient taking differently: Inject 35 Units under the skin into the appropriate area as directed Every Night.)  12   • Insulin Lispro (humaLOG) 100 UNIT/ML injection Inject 0-14 Units under the skin into the appropriate area as directed 4 (Four) Times a Day Before Meals & at Bedtime.  12   • isosorbide mononitrate (IMDUR) 60 MG 24 hr tablet Take 1 tablet by mouth Daily.     • levETIRAcetam (KEPPRA) 500 MG tablet Take 500 mg by mouth 2 (Two) Times a Day.     • lisinopril (PRINIVIL,ZESTRIL) 20 MG tablet Take 20 mg by mouth Daily.     • metoprolol tartrate (LOPRESSOR) 100 MG tablet Take 1 tablet by mouth Every 12 (Twelve) Hours. 60 tablet 11   • nicotine (NICODERM CQ) 14 MG/24HR patch Place 1 patch on the skin as directed by provider Daily As Needed (Smoker withdrawal).     • omeprazole (priLOSEC) 20 MG capsule Take 20 mg by mouth Daily.     • sennosides-docusate (senna-docusate sodium) 8.6-50 MG per tablet Take 1 tablet by mouth Daily. 60 tablet 0   • vitamin D (ERGOCALCIFEROL) 1.25 MG (58685 UT) capsule capsule Take 50,000 Units by mouth 1 (One) Time Per Week.       No current facility-administered medications for this visit.       Vital Signs  Ht 177.8 cm (70\")   Wt 118 kg (261 lb) Comment: actual weight, not stated weight  BMI 37.45 kg/m²   Physical Exam  Eyes:      Extraocular Movements: EOM normal.      Pupils: Pupils are equal, round, and reactive to light.   Neurological:      Mental Status: He is oriented to person, place, and time.      Gait: Gait is intact.      Deep Tendon Reflexes:      Reflex Scores:       Tricep reflexes are 2+ on the right side and 2+ on the left side.       Bicep reflexes are 2+ on the right side and 2+ on the left side.       Brachioradialis reflexes are 2+ on the right side and 2+ on the left side.       Patellar reflexes are 2+ on the right side and 2+ on the left side.       Achilles " reflexes are 2+ on the right side and 2+ on the left side.  Psychiatric:         Speech: Speech normal.       Neurologic Exam     Mental Status   Oriented to person, place, and time.   Speech: speech is normal     Cranial Nerves     CN II   Visual fields full to confrontation.     CN III, IV, VI   Pupils are equal, round, and reactive to light.  Extraocular motions are normal.     CN V   Right facial sensation deficit: none  Left facial sensation deficit: none    CN VII   Facial expression full, symmetric.     CN VIII   Hearing: intact    CN IX, X   Palate: symmetric    CN XI   Right sternocleidomastoid strength: normal  Left sternocleidomastoid strength: normal    CN XII   Tongue deviation: none    Motor Exam     Strength   Right deltoid: 5/5  Left deltoid: 5/5  Right biceps: 5/5  Left biceps: 5/5  Right triceps: 5/5  Left triceps: 5/5  Right interossei: 5/5  Left interossei: 5/5  Right iliopsoas: 5/5  Left iliopsoas: 5/5  Right quadriceps: 5/5  Left quadriceps: 5/5  Right anterior tibial: 5/5  Left anterior tibial: 5/5  Right gastroc: 5/5  Left gastroc: 5/5    Sensory Exam   Right arm light touch: normal  Left arm light touch: normal  Right leg light touch: normal  Left leg light touch: normal    Gait, Coordination, and Reflexes     Gait  Gait: normal    Reflexes   Right brachioradialis: 2+  Left brachioradialis: 2+  Right biceps: 2+  Left biceps: 2+  Right triceps: 2+  Left triceps: 2+  Right patellar: 2+  Left patellar: 2+  Right achilles: 2+  Left achilles: 2+  Right Jj: absent  Left Jj: absent      Incision: CDI              Results Review:   CT Head Without Contrast    Result Date: 11/25/2022  1. Postop changes in the right frontal region as before, this includes partial right frontal craniectomy with placement of a skull prosthesis. Deep to the prosthesis there is a mixed attenuation fluid collection, likely subdural in location, with maximum thickness of 9.6 mm. This collection has a component of  increased attenuation which may represent a small amount of acute versus subacute subdural hemorrhage, with a maximum thickness of up to 4 mm. A peripheral hemorrhage or mass effect is identified.  This report was finalized on 11/25/2022 13:20 by Dr. Elijah Grover MD.        Lab Results   Component Value Date    CRP 0.32 12/05/2022    CRP <0.30 09/26/2022    CRP 0.41 07/21/2022    CRP 0.86 (H) 07/19/2022    CRP 1.73 (H) 07/13/2022    CRP 5.93 (H) 07/05/2022    CRP 5.38 (H) 06/30/2022    CRP <0.30 05/26/2022    SEDRATE 23 (H) 12/05/2022    SEDRATE 16 09/26/2022    SEDRATE 56 (H) 07/19/2022    SEDRATE 67 (H) 07/14/2022    SEDRATE 47 (H) 07/13/2022    SEDRATE 80 (H) 07/05/2022    WBC 11.23 (H) 12/05/2022    WBC 11.31 (H) 10/07/2022    WBC 13.17 (H) 10/06/2022    WBC 14.28 (H) 10/05/2022    WBC 8.48 09/26/2022    WBC 8.58 07/25/2022    WBC 7.77 07/21/2022    WBC 8.27 07/14/2022    WBC 8.16 07/13/2022    WBC 6.96 07/10/2022    WBC 8.48 07/06/2022    WBC 11.26 (H) 07/05/2022             Assessment/Plan:   Elijah is done well since I saw him last.  Craniotomy appears well-healed.  Previously noted swelling is completely resolved.  CRP remains normal.  Sed rate is only slightly elevated and white count of 11.  No fevers no purulent drainage.  At this point I like to pursue conservative management.  There is a very small risk of postoperative wound infection but this will express itself if that is the case.  MRI of the brain with and without contrast in 1 year.    1. Postoperative infection, unspecified type, subsequent encounter    2. Postoperative intracranial hemorrhage (HCC)    3. Meningioma s/p craniotomy    4. Class 2 severe obesity due to excess calories with serious comorbidity and body mass index (BMI) of 38.0 to 38.9 in adult (HCC)    5. Smoker        Diagnoses and all orders for this visit:    1. Postoperative infection, unspecified type, subsequent encounter (Primary)  -     C-reactive Protein; Future  -      Sedimentation Rate; Future  -     CBC & Differential; Future    2. Postoperative intracranial hemorrhage (HCC)  -     CBC & Differential; Future    3. Meningioma s/p craniotomy  -     MRI Brain With & Without Contrast; Future    4. Class 2 severe obesity due to excess calories with serious comorbidity and body mass index (BMI) of 38.0 to 38.9 in adult (HCC)    5. Smoker        I discussed the patients findings and my recommendations with patient    Flaco Portillo MD

## 2023-01-01 ENCOUNTER — APPOINTMENT (OUTPATIENT)
Dept: GENERAL RADIOLOGY | Facility: HOSPITAL | Age: 68
DRG: 3 | End: 2023-01-01
Payer: MEDICARE

## 2023-01-01 ENCOUNTER — HOSPITAL ENCOUNTER (INPATIENT)
Facility: HOSPITAL | Age: 68
LOS: 37 days | DRG: 3 | End: 2023-05-07
Attending: NEUROLOGICAL SURGERY | Admitting: NEUROLOGICAL SURGERY
Payer: MEDICARE

## 2023-01-01 ENCOUNTER — APPOINTMENT (OUTPATIENT)
Dept: CT IMAGING | Facility: HOSPITAL | Age: 68
DRG: 3 | End: 2023-01-01
Payer: MEDICARE

## 2023-01-01 ENCOUNTER — HOSPITAL ENCOUNTER (OUTPATIENT)
Facility: HOSPITAL | Age: 68
Setting detail: SURGERY ADMIT
DRG: 3 | End: 2023-01-01
Attending: NEUROLOGICAL SURGERY | Admitting: NEUROLOGICAL SURGERY
Payer: MEDICARE

## 2023-01-01 ENCOUNTER — APPOINTMENT (OUTPATIENT)
Dept: CARDIOLOGY | Facility: HOSPITAL | Age: 68
DRG: 3 | End: 2023-01-01
Payer: MEDICARE

## 2023-01-01 ENCOUNTER — APPOINTMENT (OUTPATIENT)
Dept: NEUROLOGY | Facility: HOSPITAL | Age: 68
DRG: 3 | End: 2023-01-01
Payer: MEDICARE

## 2023-01-01 ENCOUNTER — APPOINTMENT (OUTPATIENT)
Dept: ULTRASOUND IMAGING | Facility: HOSPITAL | Age: 68
DRG: 3 | End: 2023-01-01
Payer: MEDICARE

## 2023-01-01 ENCOUNTER — APPOINTMENT (OUTPATIENT)
Dept: MRI IMAGING | Facility: HOSPITAL | Age: 68
DRG: 3 | End: 2023-01-01
Payer: MEDICARE

## 2023-01-01 VITALS
SYSTOLIC BLOOD PRESSURE: 146 MMHG | WEIGHT: 280.5 LBS | HEIGHT: 70 IN | RESPIRATION RATE: 18 BRPM | TEMPERATURE: 99 F | DIASTOLIC BLOOD PRESSURE: 95 MMHG | OXYGEN SATURATION: 100 % | HEART RATE: 123 BPM | BODY MASS INDEX: 40.16 KG/M2

## 2023-01-01 DIAGNOSIS — J96.01 ACUTE RESPIRATORY FAILURE WITH HYPOXIA: ICD-10-CM

## 2023-01-01 DIAGNOSIS — G47.33 OSA (OBSTRUCTIVE SLEEP APNEA): ICD-10-CM

## 2023-01-01 DIAGNOSIS — E11.65 TYPE 2 DIABETES MELLITUS WITH HYPERGLYCEMIA, WITH LONG-TERM CURRENT USE OF INSULIN: ICD-10-CM

## 2023-01-01 DIAGNOSIS — I21.A1 TYPE 2 MYOCARDIAL INFARCTION DUE TO ARRHYTHMIA: ICD-10-CM

## 2023-01-01 DIAGNOSIS — I50.21 ACUTE SYSTOLIC HEART FAILURE: ICD-10-CM

## 2023-01-01 DIAGNOSIS — F17.200 SMOKER: ICD-10-CM

## 2023-01-01 DIAGNOSIS — D72.829 LEUKOCYTOSIS, UNSPECIFIED TYPE: ICD-10-CM

## 2023-01-01 DIAGNOSIS — D64.89 ANEMIA DUE TO OTHER CAUSE, NOT CLASSIFIED: ICD-10-CM

## 2023-01-01 DIAGNOSIS — I15.9 SECONDARY HYPERTENSION: ICD-10-CM

## 2023-01-01 DIAGNOSIS — D32.9 MENINGIOMA: ICD-10-CM

## 2023-01-01 DIAGNOSIS — J81.0 ACUTE PULMONARY EDEMA: ICD-10-CM

## 2023-01-01 DIAGNOSIS — T81.42XA INFECTION OF DEEP INCISIONAL SURGICAL SITE AFTER PROCEDURE, INITIAL ENCOUNTER: ICD-10-CM

## 2023-01-01 DIAGNOSIS — I49.9 TYPE 2 MYOCARDIAL INFARCTION DUE TO ARRHYTHMIA: ICD-10-CM

## 2023-01-01 DIAGNOSIS — Z98.890 HISTORY OF CRANIOPLASTY: ICD-10-CM

## 2023-01-01 DIAGNOSIS — Z79.4 TYPE 2 DIABETES MELLITUS WITH HYPERGLYCEMIA, WITH LONG-TERM CURRENT USE OF INSULIN: ICD-10-CM

## 2023-01-01 DIAGNOSIS — I46.9 CARDIAC ARREST: ICD-10-CM

## 2023-01-01 DIAGNOSIS — Z98.890 S/P CRANIOTOMY: ICD-10-CM

## 2023-01-01 DIAGNOSIS — I48.0 PAROXYSMAL ATRIAL FIBRILLATION WITH RAPID VENTRICULAR RESPONSE: ICD-10-CM

## 2023-01-01 DIAGNOSIS — K21.9 GASTROESOPHAGEAL REFLUX DISEASE, UNSPECIFIED WHETHER ESOPHAGITIS PRESENT: ICD-10-CM

## 2023-01-01 DIAGNOSIS — Z74.09 IMPAIRED MOBILITY: ICD-10-CM

## 2023-01-01 DIAGNOSIS — J44.9 CHRONIC OBSTRUCTIVE PULMONARY DISEASE, UNSPECIFIED COPD TYPE: ICD-10-CM

## 2023-01-01 DIAGNOSIS — I25.83 CORONARY ARTERY DISEASE DUE TO LIPID RICH PLAQUE: ICD-10-CM

## 2023-01-01 DIAGNOSIS — F17.201 TOBACCO ABUSE, IN REMISSION: ICD-10-CM

## 2023-01-01 DIAGNOSIS — T81.40XA POSTOPERATIVE INFECTION, UNSPECIFIED TYPE, INITIAL ENCOUNTER: ICD-10-CM

## 2023-01-01 DIAGNOSIS — S06.4X0A INTRACRANIAL EPIDURAL HEMATOMA: Primary | ICD-10-CM

## 2023-01-01 DIAGNOSIS — R22.0 SWELLING OF SCALP: ICD-10-CM

## 2023-01-01 DIAGNOSIS — I25.10 CORONARY ARTERY DISEASE DUE TO LIPID RICH PLAQUE: ICD-10-CM

## 2023-01-01 DIAGNOSIS — R06.89 RESPIRATORY INSUFFICIENCY: ICD-10-CM

## 2023-01-01 DIAGNOSIS — E66.01 CLASS 2 SEVERE OBESITY DUE TO EXCESS CALORIES WITH SERIOUS COMORBIDITY AND BODY MASS INDEX (BMI) OF 38.0 TO 38.9 IN ADULT: ICD-10-CM

## 2023-01-01 DIAGNOSIS — E66.9 OBESITY (BMI 30-39.9): ICD-10-CM

## 2023-01-01 DIAGNOSIS — R60.0 FACIAL EDEMA: ICD-10-CM

## 2023-01-01 LAB
ABO GROUP BLD: NORMAL
ALBUMIN SERPL-MCNC: 2.4 G/DL (ref 3.5–5.2)
ALBUMIN SERPL-MCNC: 2.5 G/DL (ref 3.5–5.2)
ALBUMIN SERPL-MCNC: 2.5 G/DL (ref 3.5–5.2)
ALBUMIN SERPL-MCNC: 2.6 G/DL (ref 3.5–5.2)
ALBUMIN SERPL-MCNC: 2.6 G/DL (ref 3.5–5.2)
ALBUMIN SERPL-MCNC: 2.7 G/DL (ref 3.5–5.2)
ALBUMIN SERPL-MCNC: 2.8 G/DL (ref 3.5–5.2)
ALBUMIN SERPL-MCNC: 2.9 G/DL (ref 3.5–5.2)
ALBUMIN SERPL-MCNC: 3 G/DL (ref 3.5–5.2)
ALBUMIN SERPL-MCNC: 3.1 G/DL (ref 3.5–5.2)
ALBUMIN SERPL-MCNC: 3.1 G/DL (ref 3.5–5.2)
ALBUMIN SERPL-MCNC: 3.3 G/DL (ref 3.5–5.2)
ALBUMIN SERPL-MCNC: 3.5 G/DL (ref 3.5–5.2)
ALBUMIN/GLOB SERPL: 0.7 G/DL
ALBUMIN/GLOB SERPL: 0.8 G/DL
ALBUMIN/GLOB SERPL: 0.9 G/DL
ALBUMIN/GLOB SERPL: 1 G/DL
ALBUMIN/GLOB SERPL: 1.1 G/DL
ALBUMIN/GLOB SERPL: 1.1 G/DL
ALBUMIN/GLOB SERPL: 1.3 G/DL
ALP SERPL-CCNC: 100 U/L (ref 39–117)
ALP SERPL-CCNC: 102 U/L (ref 39–117)
ALP SERPL-CCNC: 106 U/L (ref 39–117)
ALP SERPL-CCNC: 109 U/L (ref 39–117)
ALP SERPL-CCNC: 110 U/L (ref 39–117)
ALP SERPL-CCNC: 111 U/L (ref 39–117)
ALP SERPL-CCNC: 115 U/L (ref 39–117)
ALP SERPL-CCNC: 119 U/L (ref 39–117)
ALP SERPL-CCNC: 134 U/L (ref 39–117)
ALP SERPL-CCNC: 136 U/L (ref 39–117)
ALP SERPL-CCNC: 151 U/L (ref 39–117)
ALP SERPL-CCNC: 151 U/L (ref 39–117)
ALP SERPL-CCNC: 208 U/L (ref 39–117)
ALP SERPL-CCNC: 214 U/L (ref 39–117)
ALP SERPL-CCNC: 252 U/L (ref 39–117)
ALP SERPL-CCNC: 83 U/L (ref 39–117)
ALP SERPL-CCNC: 85 U/L (ref 39–117)
ALP SERPL-CCNC: 87 U/L (ref 39–117)
ALP SERPL-CCNC: 89 U/L (ref 39–117)
ALP SERPL-CCNC: 89 U/L (ref 39–117)
ALP SERPL-CCNC: 92 U/L (ref 39–117)
ALP SERPL-CCNC: 93 U/L (ref 39–117)
ALT SERPL W P-5'-P-CCNC: 10 U/L (ref 1–41)
ALT SERPL W P-5'-P-CCNC: 105 U/L (ref 1–41)
ALT SERPL W P-5'-P-CCNC: 106 U/L (ref 1–41)
ALT SERPL W P-5'-P-CCNC: 11 U/L (ref 1–41)
ALT SERPL W P-5'-P-CCNC: 11 U/L (ref 1–41)
ALT SERPL W P-5'-P-CCNC: 12 U/L (ref 1–41)
ALT SERPL W P-5'-P-CCNC: 12 U/L (ref 1–41)
ALT SERPL W P-5'-P-CCNC: 141 U/L (ref 1–41)
ALT SERPL W P-5'-P-CCNC: 15 U/L (ref 1–41)
ALT SERPL W P-5'-P-CCNC: 159 U/L (ref 1–41)
ALT SERPL W P-5'-P-CCNC: 16 U/L (ref 1–41)
ALT SERPL W P-5'-P-CCNC: 17 U/L (ref 1–41)
ALT SERPL W P-5'-P-CCNC: 19 U/L (ref 1–41)
ALT SERPL W P-5'-P-CCNC: 20 U/L (ref 1–41)
ALT SERPL W P-5'-P-CCNC: 20 U/L (ref 1–41)
ALT SERPL W P-5'-P-CCNC: 22 U/L (ref 1–41)
ALT SERPL W P-5'-P-CCNC: 24 U/L (ref 1–41)
ALT SERPL W P-5'-P-CCNC: 26 U/L (ref 1–41)
ALT SERPL W P-5'-P-CCNC: 29 U/L (ref 1–41)
ALT SERPL W P-5'-P-CCNC: 32 U/L (ref 1–41)
ALT SERPL W P-5'-P-CCNC: 60 U/L (ref 1–41)
ALT SERPL W P-5'-P-CCNC: 69 U/L (ref 1–41)
ANA SER QL IF: NEGATIVE
ANION GAP SERPL CALCULATED.3IONS-SCNC: 10 MMOL/L (ref 5–15)
ANION GAP SERPL CALCULATED.3IONS-SCNC: 11 MMOL/L (ref 5–15)
ANION GAP SERPL CALCULATED.3IONS-SCNC: 11 MMOL/L (ref 5–15)
ANION GAP SERPL CALCULATED.3IONS-SCNC: 12 MMOL/L (ref 5–15)
ANION GAP SERPL CALCULATED.3IONS-SCNC: 13 MMOL/L (ref 5–15)
ANION GAP SERPL CALCULATED.3IONS-SCNC: 14 MMOL/L (ref 5–15)
ANION GAP SERPL CALCULATED.3IONS-SCNC: 15 MMOL/L (ref 5–15)
ANION GAP SERPL CALCULATED.3IONS-SCNC: 17 MMOL/L (ref 5–15)
ANION GAP SERPL CALCULATED.3IONS-SCNC: 17 MMOL/L (ref 5–15)
ANION GAP SERPL CALCULATED.3IONS-SCNC: 20 MMOL/L (ref 5–15)
ANION GAP SERPL CALCULATED.3IONS-SCNC: 21 MMOL/L (ref 5–15)
ANION GAP SERPL CALCULATED.3IONS-SCNC: 22 MMOL/L (ref 5–15)
ANION GAP SERPL CALCULATED.3IONS-SCNC: 7 MMOL/L (ref 5–15)
ANION GAP SERPL CALCULATED.3IONS-SCNC: 8 MMOL/L (ref 5–15)
ANION GAP SERPL CALCULATED.3IONS-SCNC: 9 MMOL/L (ref 5–15)
APPEARANCE CSF: CLEAR
APTT PPP: 34.3 SECONDS (ref 24.1–35)
APTT PPP: 53.9 SECONDS (ref 24.1–35)
ARTERIAL PATENCY WRIST A: ABNORMAL
ARTERIAL PATENCY WRIST A: POSITIVE
AST SERPL-CCNC: 13 U/L (ref 1–40)
AST SERPL-CCNC: 15 U/L (ref 1–40)
AST SERPL-CCNC: 15 U/L (ref 1–40)
AST SERPL-CCNC: 17 U/L (ref 1–40)
AST SERPL-CCNC: 17 U/L (ref 1–40)
AST SERPL-CCNC: 170 U/L (ref 1–40)
AST SERPL-CCNC: 18 U/L (ref 1–40)
AST SERPL-CCNC: 19 U/L (ref 1–40)
AST SERPL-CCNC: 198 U/L (ref 1–40)
AST SERPL-CCNC: 20 U/L (ref 1–40)
AST SERPL-CCNC: 22 U/L (ref 1–40)
AST SERPL-CCNC: 25 U/L (ref 1–40)
AST SERPL-CCNC: 27 U/L (ref 1–40)
AST SERPL-CCNC: 28 U/L (ref 1–40)
AST SERPL-CCNC: 29 U/L (ref 1–40)
AST SERPL-CCNC: 29 U/L (ref 1–40)
AST SERPL-CCNC: 30 U/L (ref 1–40)
AST SERPL-CCNC: 38 U/L (ref 1–40)
AST SERPL-CCNC: 38 U/L (ref 1–40)
AST SERPL-CCNC: 83 U/L (ref 1–40)
ATMOSPHERIC PRESS: 741 MMHG
ATMOSPHERIC PRESS: 742 MMHG
ATMOSPHERIC PRESS: 743 MMHG
ATMOSPHERIC PRESS: 744 MMHG
ATMOSPHERIC PRESS: 745 MMHG
ATMOSPHERIC PRESS: 745 MMHG
ATMOSPHERIC PRESS: 746 MMHG
ATMOSPHERIC PRESS: 747 MMHG
ATMOSPHERIC PRESS: 747 MMHG
ATMOSPHERIC PRESS: 748 MMHG
ATMOSPHERIC PRESS: 749 MMHG
ATMOSPHERIC PRESS: 752 MMHG
ATMOSPHERIC PRESS: 752 MMHG
ATMOSPHERIC PRESS: 753 MMHG
ATMOSPHERIC PRESS: 753 MMHG
ATMOSPHERIC PRESS: 754 MMHG
B PARAPERT DNA SPEC QL NAA+PROBE: NOT DETECTED
B PERT DNA SPEC QL NAA+PROBE: NOT DETECTED
BACTERIA SPEC AEROBE CULT: ABNORMAL
BACTERIA SPEC AEROBE CULT: NO GROWTH
BACTERIA SPEC AEROBE CULT: NO GROWTH
BACTERIA SPEC AEROBE CULT: NORMAL
BACTERIA SPEC RESP CULT: ABNORMAL
BACTERIA SPEC RESP CULT: ABNORMAL
BACTERIA SPEC RESP CULT: NORMAL
BACTERIA UR QL AUTO: ABNORMAL /HPF
BASE EXCESS BLDA CALC-SCNC: -1.5 MMOL/L (ref 0–2)
BASE EXCESS BLDA CALC-SCNC: -1.8 MMOL/L (ref 0–2)
BASE EXCESS BLDA CALC-SCNC: -6.3 MMOL/L (ref 0–2)
BASE EXCESS BLDA CALC-SCNC: -6.4 MMOL/L (ref 0–2)
BASE EXCESS BLDA CALC-SCNC: -7.4 MMOL/L (ref 0–2)
BASE EXCESS BLDA CALC-SCNC: 0 MMOL/L (ref 0–2)
BASE EXCESS BLDA CALC-SCNC: 0.3 MMOL/L (ref 0–2)
BASE EXCESS BLDA CALC-SCNC: 1.5 MMOL/L (ref 0–2)
BASE EXCESS BLDA CALC-SCNC: 10.4 MMOL/L (ref 0–2)
BASE EXCESS BLDA CALC-SCNC: 10.8 MMOL/L (ref 0–2)
BASE EXCESS BLDA CALC-SCNC: 11.8 MMOL/L (ref 0–2)
BASE EXCESS BLDA CALC-SCNC: 12.1 MMOL/L (ref 0–2)
BASE EXCESS BLDA CALC-SCNC: 12.8 MMOL/L (ref 0–2)
BASE EXCESS BLDA CALC-SCNC: 13.1 MMOL/L (ref 0–2)
BASE EXCESS BLDA CALC-SCNC: 13.3 MMOL/L (ref 0–2)
BASE EXCESS BLDA CALC-SCNC: 2.6 MMOL/L (ref 0–2)
BASE EXCESS BLDA CALC-SCNC: 3 MMOL/L (ref 0–2)
BASE EXCESS BLDA CALC-SCNC: 4.1 MMOL/L (ref 0–2)
BASE EXCESS BLDA CALC-SCNC: 5.2 MMOL/L (ref 0–2)
BASE EXCESS BLDA CALC-SCNC: 6.4 MMOL/L (ref 0–2)
BASE EXCESS BLDA CALC-SCNC: 6.6 MMOL/L (ref 0–2)
BASE EXCESS BLDA CALC-SCNC: 7.1 MMOL/L (ref 0–2)
BASE EXCESS BLDA CALC-SCNC: 7.5 MMOL/L (ref 0–2)
BASE EXCESS BLDA CALC-SCNC: 7.5 MMOL/L (ref 0–2)
BASE EXCESS BLDA CALC-SCNC: 8.3 MMOL/L (ref 0–2)
BASE EXCESS BLDA CALC-SCNC: 8.9 MMOL/L (ref 0–2)
BASE EXCESS BLDA CALC-SCNC: 9.7 MMOL/L (ref 0–2)
BASOPHILS # BLD AUTO: 0.01 10*3/MM3 (ref 0–0.2)
BASOPHILS # BLD AUTO: 0.03 10*3/MM3 (ref 0–0.2)
BASOPHILS # BLD AUTO: 0.03 10*3/MM3 (ref 0–0.2)
BASOPHILS # BLD AUTO: 0.04 10*3/MM3 (ref 0–0.2)
BASOPHILS # BLD AUTO: 0.05 10*3/MM3 (ref 0–0.2)
BASOPHILS # BLD AUTO: 0.06 10*3/MM3 (ref 0–0.2)
BASOPHILS # BLD AUTO: 0.1 10*3/MM3 (ref 0–0.2)
BASOPHILS NFR BLD AUTO: 0.1 % (ref 0–1.5)
BASOPHILS NFR BLD AUTO: 0.2 % (ref 0–1.5)
BASOPHILS NFR BLD AUTO: 0.2 % (ref 0–1.5)
BASOPHILS NFR BLD AUTO: 0.3 % (ref 0–1.5)
BASOPHILS NFR BLD AUTO: 0.4 % (ref 0–1.5)
BASOPHILS NFR BLD AUTO: 0.5 % (ref 0–1.5)
BDY SITE: ABNORMAL
BH CV ECHO MEAS - AO MAX PG: 3.4 MMHG
BH CV ECHO MEAS - AO MEAN PG: 1.5 MMHG
BH CV ECHO MEAS - AO ROOT DIAM: 3.6 CM
BH CV ECHO MEAS - AO V2 MAX: 91.8 CM/SEC
BH CV ECHO MEAS - AO V2 VTI: 13.4 CM
BH CV ECHO MEAS - AVA(I,D): 3.7 CM2
BH CV ECHO MEAS - EDV(CUBED): 116.2 ML
BH CV ECHO MEAS - EDV(MOD-SP2): 116 ML
BH CV ECHO MEAS - EDV(MOD-SP4): 181 ML
BH CV ECHO MEAS - EDV(MOD-SP4): 86.7 ML
BH CV ECHO MEAS - EF(MOD-BP): 47.6 %
BH CV ECHO MEAS - EF(MOD-SP2): 57.5 %
BH CV ECHO MEAS - EF(MOD-SP4): 28.7 %
BH CV ECHO MEAS - EF(MOD-SP4): 42.7 %
BH CV ECHO MEAS - ESV(CUBED): 36.3 ML
BH CV ECHO MEAS - ESV(MOD-SP2): 49.3 ML
BH CV ECHO MEAS - ESV(MOD-SP4): 129 ML
BH CV ECHO MEAS - ESV(MOD-SP4): 49.7 ML
BH CV ECHO MEAS - FS: 32.2 %
BH CV ECHO MEAS - IVS/LVPW: 0.76 CM
BH CV ECHO MEAS - IVSD: 1.3 CM
BH CV ECHO MEAS - LA DIMENSION: 4.9 CM
BH CV ECHO MEAS - LAT PEAK E' VEL: 9.1 CM/SEC
BH CV ECHO MEAS - LV DIASTOLIC VOL/BSA (35-75): 75.5 CM2
BH CV ECHO MEAS - LV MASS(C)D: 208.2 GRAMS
BH CV ECHO MEAS - LV MAX PG: 1.61 MMHG
BH CV ECHO MEAS - LV MEAN PG: 1 MMHG
BH CV ECHO MEAS - LV SYSTOLIC VOL/BSA (12-30): 53.8 CM2
BH CV ECHO MEAS - LV V1 MAX: 63.3 CM/SEC
BH CV ECHO MEAS - LV V1 VTI: 11 CM
BH CV ECHO MEAS - LVIDD: 4.9 CM
BH CV ECHO MEAS - LVIDS: 3.3 CM
BH CV ECHO MEAS - LVOT AREA: 4.5 CM2
BH CV ECHO MEAS - LVOT DIAM: 2.4 CM
BH CV ECHO MEAS - LVPWD: 1.29 CM
BH CV ECHO MEAS - MED PEAK E' VEL: 5.6 CM/SEC
BH CV ECHO MEAS - MV E MAX VEL: 75.3 CM/SEC
BH CV ECHO MEAS - MV MAX PG: 3.6 MMHG
BH CV ECHO MEAS - MV MEAN PG: 1 MMHG
BH CV ECHO MEAS - MV V2 VTI: 23.1 CM
BH CV ECHO MEAS - MVA(VTI): 2.15 CM2
BH CV ECHO MEAS - PA V2 MAX: 98.5 CM/SEC
BH CV ECHO MEAS - PI END-D VEL: 126.5 CM/SEC
BH CV ECHO MEAS - PI END-D VEL: 170 CM/SEC
BH CV ECHO MEAS - RAP SYSTOLE: 5 MMHG
BH CV ECHO MEAS - RVDD: 3.6 CM
BH CV ECHO MEAS - RVSP: 37 MMHG
BH CV ECHO MEAS - SI(MOD-SP4): 21.7 ML/M2
BH CV ECHO MEAS - SV(LVOT): 49.8 ML
BH CV ECHO MEAS - SV(MOD-SP2): 66.7 ML
BH CV ECHO MEAS - SV(MOD-SP4): 37 ML
BH CV ECHO MEAS - SV(MOD-SP4): 52 ML
BH CV ECHO MEAS - TAPSE (>1.6): 2.6 CM
BH CV ECHO MEAS - TR MAX PG: 32 MMHG
BH CV ECHO MEAS - TR MAX VEL: 283 CM/SEC
BH CV ECHO MEASUREMENTS AVERAGE E/E' RATIO: 10.24
BH CV XLRA - TDI S': 17.5 CM/SEC
BILIRUB SERPL-MCNC: 0.3 MG/DL (ref 0–1.2)
BILIRUB SERPL-MCNC: 0.4 MG/DL (ref 0–1.2)
BILIRUB SERPL-MCNC: 0.4 MG/DL (ref 0–1.2)
BILIRUB SERPL-MCNC: 0.5 MG/DL (ref 0–1.2)
BILIRUB SERPL-MCNC: 0.6 MG/DL (ref 0–1.2)
BILIRUB SERPL-MCNC: 0.7 MG/DL (ref 0–1.2)
BILIRUB SERPL-MCNC: 1.2 MG/DL (ref 0–1.2)
BILIRUB UR QL STRIP: ABNORMAL
BILIRUB UR QL STRIP: NEGATIVE
BILIRUB UR QL STRIP: NEGATIVE
BLD GP AB SCN SERPL QL: NEGATIVE
BODY TEMPERATURE: 37 C
BUN SERPL-MCNC: 14 MG/DL (ref 8–23)
BUN SERPL-MCNC: 15 MG/DL (ref 8–23)
BUN SERPL-MCNC: 15 MG/DL (ref 8–23)
BUN SERPL-MCNC: 16 MG/DL (ref 8–23)
BUN SERPL-MCNC: 16 MG/DL (ref 8–23)
BUN SERPL-MCNC: 21 MG/DL (ref 8–23)
BUN SERPL-MCNC: 24 MG/DL (ref 8–23)
BUN SERPL-MCNC: 28 MG/DL (ref 8–23)
BUN SERPL-MCNC: 29 MG/DL (ref 8–23)
BUN SERPL-MCNC: 30 MG/DL (ref 8–23)
BUN SERPL-MCNC: 33 MG/DL (ref 8–23)
BUN SERPL-MCNC: 34 MG/DL (ref 8–23)
BUN SERPL-MCNC: 35 MG/DL (ref 8–23)
BUN SERPL-MCNC: 35 MG/DL (ref 8–23)
BUN SERPL-MCNC: 36 MG/DL (ref 8–23)
BUN SERPL-MCNC: 39 MG/DL (ref 8–23)
BUN SERPL-MCNC: 40 MG/DL (ref 8–23)
BUN SERPL-MCNC: 41 MG/DL (ref 8–23)
BUN SERPL-MCNC: 42 MG/DL (ref 8–23)
BUN SERPL-MCNC: 43 MG/DL (ref 8–23)
BUN SERPL-MCNC: 44 MG/DL (ref 8–23)
BUN SERPL-MCNC: 45 MG/DL (ref 8–23)
BUN SERPL-MCNC: 46 MG/DL (ref 8–23)
BUN SERPL-MCNC: 46 MG/DL (ref 8–23)
BUN SERPL-MCNC: 47 MG/DL (ref 8–23)
BUN SERPL-MCNC: 50 MG/DL (ref 8–23)
BUN SERPL-MCNC: 53 MG/DL (ref 8–23)
BUN SERPL-MCNC: 56 MG/DL (ref 8–23)
BUN SERPL-MCNC: 57 MG/DL (ref 8–23)
BUN SERPL-MCNC: 69 MG/DL (ref 8–23)
BUN SERPL-MCNC: 71 MG/DL (ref 8–23)
BUN SERPL-MCNC: 71 MG/DL (ref 8–23)
BUN SERPL-MCNC: 74 MG/DL (ref 8–23)
BUN/CREAT SERPL: 10.1 (ref 7–25)
BUN/CREAT SERPL: 10.1 (ref 7–25)
BUN/CREAT SERPL: 10.4 (ref 7–25)
BUN/CREAT SERPL: 12.7 (ref 7–25)
BUN/CREAT SERPL: 17 (ref 7–25)
BUN/CREAT SERPL: 20.2 (ref 7–25)
BUN/CREAT SERPL: 23.2 (ref 7–25)
BUN/CREAT SERPL: 25 (ref 7–25)
BUN/CREAT SERPL: 25.2 (ref 7–25)
BUN/CREAT SERPL: 26.2 (ref 7–25)
BUN/CREAT SERPL: 27.1 (ref 7–25)
BUN/CREAT SERPL: 27.8 (ref 7–25)
BUN/CREAT SERPL: 28.3 (ref 7–25)
BUN/CREAT SERPL: 29.1 (ref 7–25)
BUN/CREAT SERPL: 29.7 (ref 7–25)
BUN/CREAT SERPL: 30.2 (ref 7–25)
BUN/CREAT SERPL: 30.7 (ref 7–25)
BUN/CREAT SERPL: 30.9 (ref 7–25)
BUN/CREAT SERPL: 31 (ref 7–25)
BUN/CREAT SERPL: 31 (ref 7–25)
BUN/CREAT SERPL: 31.3 (ref 7–25)
BUN/CREAT SERPL: 32.2 (ref 7–25)
BUN/CREAT SERPL: 32.4 (ref 7–25)
BUN/CREAT SERPL: 32.4 (ref 7–25)
BUN/CREAT SERPL: 33.3 (ref 7–25)
BUN/CREAT SERPL: 36 (ref 7–25)
BUN/CREAT SERPL: 36.1 (ref 7–25)
BUN/CREAT SERPL: 36.5 (ref 7–25)
BUN/CREAT SERPL: 36.8 (ref 7–25)
BUN/CREAT SERPL: 37.3 (ref 7–25)
BUN/CREAT SERPL: 37.6 (ref 7–25)
BUN/CREAT SERPL: 38.1 (ref 7–25)
BUN/CREAT SERPL: 39.7 (ref 7–25)
BUN/CREAT SERPL: 41.1 (ref 7–25)
BUN/CREAT SERPL: 43.8 (ref 7–25)
BURR CELLS BLD QL SMEAR: ABNORMAL
C PNEUM DNA NPH QL NAA+NON-PROBE: NOT DETECTED
C TRACH RRNA CVX QL NAA+PROBE: NOT DETECTED
C-ANCA TITR SER IF: NORMAL TITER
CALCIUM SPEC-SCNC: 7.4 MG/DL (ref 8.6–10.5)
CALCIUM SPEC-SCNC: 7.6 MG/DL (ref 8.6–10.5)
CALCIUM SPEC-SCNC: 7.7 MG/DL (ref 8.6–10.5)
CALCIUM SPEC-SCNC: 7.8 MG/DL (ref 8.6–10.5)
CALCIUM SPEC-SCNC: 7.9 MG/DL (ref 8.6–10.5)
CALCIUM SPEC-SCNC: 8 MG/DL (ref 8.6–10.5)
CALCIUM SPEC-SCNC: 8.1 MG/DL (ref 8.6–10.5)
CALCIUM SPEC-SCNC: 8.2 MG/DL (ref 8.6–10.5)
CALCIUM SPEC-SCNC: 8.2 MG/DL (ref 8.6–10.5)
CALCIUM SPEC-SCNC: 8.3 MG/DL (ref 8.6–10.5)
CALCIUM SPEC-SCNC: 8.5 MG/DL (ref 8.6–10.5)
CALCIUM SPEC-SCNC: 8.6 MG/DL (ref 8.6–10.5)
CALCIUM SPEC-SCNC: 8.7 MG/DL (ref 8.6–10.5)
CALCIUM SPEC-SCNC: 9 MG/DL (ref 8.6–10.5)
CHLORIDE SERPL-SCNC: 100 MMOL/L (ref 98–107)
CHLORIDE SERPL-SCNC: 100 MMOL/L (ref 98–107)
CHLORIDE SERPL-SCNC: 101 MMOL/L (ref 98–107)
CHLORIDE SERPL-SCNC: 102 MMOL/L (ref 98–107)
CHLORIDE SERPL-SCNC: 103 MMOL/L (ref 98–107)
CHLORIDE SERPL-SCNC: 103 MMOL/L (ref 98–107)
CHLORIDE SERPL-SCNC: 105 MMOL/L (ref 98–107)
CHLORIDE SERPL-SCNC: 105 MMOL/L (ref 98–107)
CHLORIDE SERPL-SCNC: 92 MMOL/L (ref 98–107)
CHLORIDE SERPL-SCNC: 94 MMOL/L (ref 98–107)
CHLORIDE SERPL-SCNC: 95 MMOL/L (ref 98–107)
CHLORIDE SERPL-SCNC: 96 MMOL/L (ref 98–107)
CHLORIDE SERPL-SCNC: 97 MMOL/L (ref 98–107)
CHLORIDE SERPL-SCNC: 98 MMOL/L (ref 98–107)
CHLORIDE SERPL-SCNC: 98 MMOL/L (ref 98–107)
CHLORIDE SERPL-SCNC: 99 MMOL/L (ref 98–107)
CLARITY UR: ABNORMAL
CLARITY UR: ABNORMAL
CLARITY UR: CLEAR
CO2 SERPL-SCNC: 15 MMOL/L (ref 22–29)
CO2 SERPL-SCNC: 16 MMOL/L (ref 22–29)
CO2 SERPL-SCNC: 19 MMOL/L (ref 22–29)
CO2 SERPL-SCNC: 20 MMOL/L (ref 22–29)
CO2 SERPL-SCNC: 21 MMOL/L (ref 22–29)
CO2 SERPL-SCNC: 23 MMOL/L (ref 22–29)
CO2 SERPL-SCNC: 24 MMOL/L (ref 22–29)
CO2 SERPL-SCNC: 24 MMOL/L (ref 22–29)
CO2 SERPL-SCNC: 25 MMOL/L (ref 22–29)
CO2 SERPL-SCNC: 25 MMOL/L (ref 22–29)
CO2 SERPL-SCNC: 26 MMOL/L (ref 22–29)
CO2 SERPL-SCNC: 27 MMOL/L (ref 22–29)
CO2 SERPL-SCNC: 27 MMOL/L (ref 22–29)
CO2 SERPL-SCNC: 28 MMOL/L (ref 22–29)
CO2 SERPL-SCNC: 29 MMOL/L (ref 22–29)
CO2 SERPL-SCNC: 30 MMOL/L (ref 22–29)
CO2 SERPL-SCNC: 31 MMOL/L (ref 22–29)
CO2 SERPL-SCNC: 32 MMOL/L (ref 22–29)
CO2 SERPL-SCNC: 32 MMOL/L (ref 22–29)
CO2 SERPL-SCNC: 33 MMOL/L (ref 22–29)
CO2 SERPL-SCNC: 33 MMOL/L (ref 22–29)
COLOR CSF: COLORLESS
COLOR SPUN CSF: COLORLESS
COLOR UR: ABNORMAL
CREAT SERPL-MCNC: 0.94 MG/DL (ref 0.76–1.27)
CREAT SERPL-MCNC: 0.96 MG/DL (ref 0.76–1.27)
CREAT SERPL-MCNC: 1.03 MG/DL (ref 0.76–1.27)
CREAT SERPL-MCNC: 1.04 MG/DL (ref 0.76–1.27)
CREAT SERPL-MCNC: 1.07 MG/DL (ref 0.76–1.27)
CREAT SERPL-MCNC: 1.07 MG/DL (ref 0.76–1.27)
CREAT SERPL-MCNC: 1.11 MG/DL (ref 0.76–1.27)
CREAT SERPL-MCNC: 1.13 MG/DL (ref 0.76–1.27)
CREAT SERPL-MCNC: 1.14 MG/DL (ref 0.76–1.27)
CREAT SERPL-MCNC: 1.15 MG/DL (ref 0.76–1.27)
CREAT SERPL-MCNC: 1.16 MG/DL (ref 0.76–1.27)
CREAT SERPL-MCNC: 1.17 MG/DL (ref 0.76–1.27)
CREAT SERPL-MCNC: 1.19 MG/DL (ref 0.76–1.27)
CREAT SERPL-MCNC: 1.2 MG/DL (ref 0.76–1.27)
CREAT SERPL-MCNC: 1.25 MG/DL (ref 0.76–1.27)
CREAT SERPL-MCNC: 1.26 MG/DL (ref 0.76–1.27)
CREAT SERPL-MCNC: 1.26 MG/DL (ref 0.76–1.27)
CREAT SERPL-MCNC: 1.28 MG/DL (ref 0.76–1.27)
CREAT SERPL-MCNC: 1.34 MG/DL (ref 0.76–1.27)
CREAT SERPL-MCNC: 1.36 MG/DL (ref 0.76–1.27)
CREAT SERPL-MCNC: 1.36 MG/DL (ref 0.76–1.27)
CREAT SERPL-MCNC: 1.38 MG/DL (ref 0.76–1.27)
CREAT SERPL-MCNC: 1.41 MG/DL (ref 0.76–1.27)
CREAT SERPL-MCNC: 1.41 MG/DL (ref 0.76–1.27)
CREAT SERPL-MCNC: 1.42 MG/DL (ref 0.76–1.27)
CREAT SERPL-MCNC: 1.44 MG/DL (ref 0.76–1.27)
CREAT SERPL-MCNC: 1.46 MG/DL (ref 0.76–1.27)
CREAT SERPL-MCNC: 1.49 MG/DL (ref 0.76–1.27)
CREAT SERPL-MCNC: 1.49 MG/DL (ref 0.76–1.27)
CREAT SERPL-MCNC: 1.5 MG/DL (ref 0.76–1.27)
CREAT SERPL-MCNC: 1.56 MG/DL (ref 0.76–1.27)
CREAT SERPL-MCNC: 1.62 MG/DL (ref 0.76–1.27)
CREAT SERPL-MCNC: 1.8 MG/DL (ref 0.76–1.27)
CREAT SERPL-MCNC: 1.81 MG/DL (ref 0.76–1.27)
CREAT SERPL-MCNC: 1.93 MG/DL (ref 0.76–1.27)
CRP SERPL-MCNC: 1.39 MG/DL (ref 0–0.5)
CRP SERPL-MCNC: 1.44 MG/DL (ref 0–0.5)
CRP SERPL-MCNC: 12.72 MG/DL (ref 0–0.5)
CRP SERPL-MCNC: 2.75 MG/DL (ref 0–0.5)
CRP SERPL-MCNC: 21.26 MG/DL (ref 0–0.5)
CRP SERPL-MCNC: 7.05 MG/DL (ref 0–0.5)
D DIMER PPP FEU-MCNC: 0.66 MCGFEU/ML (ref 0–0.67)
D DIMER PPP FEU-MCNC: 0.76 MCGFEU/ML (ref 0–0.67)
D-LACTATE SERPL-SCNC: 2.1 MMOL/L (ref 0.5–2)
D-LACTATE SERPL-SCNC: 2.3 MMOL/L (ref 0.5–2)
D-LACTATE SERPL-SCNC: 2.4 MMOL/L (ref 0.5–2)
D-LACTATE SERPL-SCNC: 2.6 MMOL/L (ref 0.5–2)
D-LACTATE SERPL-SCNC: 2.7 MMOL/L (ref 0.5–2)
D-LACTATE SERPL-SCNC: 2.9 MMOL/L (ref 0.5–2)
D-LACTATE SERPL-SCNC: 3.3 MMOL/L (ref 0.5–2)
D-LACTATE SERPL-SCNC: 6.9 MMOL/L (ref 0.5–2)
DEPRECATED RDW RBC AUTO: 39.8 FL (ref 37–54)
DEPRECATED RDW RBC AUTO: 40.1 FL (ref 37–54)
DEPRECATED RDW RBC AUTO: 41 FL (ref 37–54)
DEPRECATED RDW RBC AUTO: 41.3 FL (ref 37–54)
DEPRECATED RDW RBC AUTO: 41.8 FL (ref 37–54)
DEPRECATED RDW RBC AUTO: 42.8 FL (ref 37–54)
DEPRECATED RDW RBC AUTO: 42.9 FL (ref 37–54)
DEPRECATED RDW RBC AUTO: 47.7 FL (ref 37–54)
DEPRECATED RDW RBC AUTO: 47.8 FL (ref 37–54)
DEPRECATED RDW RBC AUTO: 48.1 FL (ref 37–54)
DEPRECATED RDW RBC AUTO: 49.1 FL (ref 37–54)
DEPRECATED RDW RBC AUTO: 49.8 FL (ref 37–54)
DEPRECATED RDW RBC AUTO: 50 FL (ref 37–54)
DEPRECATED RDW RBC AUTO: 51.1 FL (ref 37–54)
DEPRECATED RDW RBC AUTO: 51.2 FL (ref 37–54)
DEPRECATED RDW RBC AUTO: 51.6 FL (ref 37–54)
DEPRECATED RDW RBC AUTO: 51.8 FL (ref 37–54)
DEPRECATED RDW RBC AUTO: 52.8 FL (ref 37–54)
DEPRECATED RDW RBC AUTO: 53.6 FL (ref 37–54)
DEPRECATED RDW RBC AUTO: 55.3 FL (ref 37–54)
DIGITOXIN SERPL-MCNC: <5 NG/ML (ref 10–25)
EGFRCR SERPLBLD CKD-EPI 2021: 37.5 ML/MIN/1.73
EGFRCR SERPLBLD CKD-EPI 2021: 40.5 ML/MIN/1.73
EGFRCR SERPLBLD CKD-EPI 2021: 40.7 ML/MIN/1.73
EGFRCR SERPLBLD CKD-EPI 2021: 46.2 ML/MIN/1.73
EGFRCR SERPLBLD CKD-EPI 2021: 48.4 ML/MIN/1.73
EGFRCR SERPLBLD CKD-EPI 2021: 50.7 ML/MIN/1.73
EGFRCR SERPLBLD CKD-EPI 2021: 51.1 ML/MIN/1.73
EGFRCR SERPLBLD CKD-EPI 2021: 51.1 ML/MIN/1.73
EGFRCR SERPLBLD CKD-EPI 2021: 52.4 ML/MIN/1.73
EGFRCR SERPLBLD CKD-EPI 2021: 53.3 ML/MIN/1.73
EGFRCR SERPLBLD CKD-EPI 2021: 54.2 ML/MIN/1.73
EGFRCR SERPLBLD CKD-EPI 2021: 54.6 ML/MIN/1.73
EGFRCR SERPLBLD CKD-EPI 2021: 54.6 ML/MIN/1.73
EGFRCR SERPLBLD CKD-EPI 2021: 56 ML/MIN/1.73
EGFRCR SERPLBLD CKD-EPI 2021: 57 ML/MIN/1.73
EGFRCR SERPLBLD CKD-EPI 2021: 57 ML/MIN/1.73
EGFRCR SERPLBLD CKD-EPI 2021: 58.1 ML/MIN/1.73
EGFRCR SERPLBLD CKD-EPI 2021: 61.3 ML/MIN/1.73
EGFRCR SERPLBLD CKD-EPI 2021: 62.5 ML/MIN/1.73
EGFRCR SERPLBLD CKD-EPI 2021: 62.5 ML/MIN/1.73
EGFRCR SERPLBLD CKD-EPI 2021: 63.1 ML/MIN/1.73
EGFRCR SERPLBLD CKD-EPI 2021: 66.3 ML/MIN/1.73
EGFRCR SERPLBLD CKD-EPI 2021: 67 ML/MIN/1.73
EGFRCR SERPLBLD CKD-EPI 2021: 68.3 ML/MIN/1.73
EGFRCR SERPLBLD CKD-EPI 2021: 69 ML/MIN/1.73
EGFRCR SERPLBLD CKD-EPI 2021: 69.8 ML/MIN/1.73
EGFRCR SERPLBLD CKD-EPI 2021: 70.5 ML/MIN/1.73
EGFRCR SERPLBLD CKD-EPI 2021: 71.2 ML/MIN/1.73
EGFRCR SERPLBLD CKD-EPI 2021: 72.8 ML/MIN/1.73
EGFRCR SERPLBLD CKD-EPI 2021: 76.1 ML/MIN/1.73
EGFRCR SERPLBLD CKD-EPI 2021: 76.1 ML/MIN/1.73
EGFRCR SERPLBLD CKD-EPI 2021: 78.7 ML/MIN/1.73
EGFRCR SERPLBLD CKD-EPI 2021: 79.6 ML/MIN/1.73
EGFRCR SERPLBLD CKD-EPI 2021: 86.6 ML/MIN/1.73
EGFRCR SERPLBLD CKD-EPI 2021: 88.9 ML/MIN/1.73
EOSINOPHIL # BLD AUTO: 0 10*3/MM3 (ref 0–0.4)
EOSINOPHIL # BLD AUTO: 0 10*3/MM3 (ref 0–0.4)
EOSINOPHIL # BLD AUTO: 0.01 10*3/MM3 (ref 0–0.4)
EOSINOPHIL # BLD AUTO: 0.03 10*3/MM3 (ref 0–0.4)
EOSINOPHIL # BLD AUTO: 0.03 10*3/MM3 (ref 0–0.4)
EOSINOPHIL # BLD AUTO: 0.04 10*3/MM3 (ref 0–0.4)
EOSINOPHIL # BLD AUTO: 0.05 10*3/MM3 (ref 0–0.4)
EOSINOPHIL # BLD AUTO: 0.07 10*3/MM3 (ref 0–0.4)
EOSINOPHIL # BLD AUTO: 0.08 10*3/MM3 (ref 0–0.4)
EOSINOPHIL # BLD AUTO: 0.1 10*3/MM3 (ref 0–0.4)
EOSINOPHIL NFR BLD AUTO: 0 % (ref 0.3–6.2)
EOSINOPHIL NFR BLD AUTO: 0 % (ref 0.3–6.2)
EOSINOPHIL NFR BLD AUTO: 0.1 % (ref 0.3–6.2)
EOSINOPHIL NFR BLD AUTO: 0.2 % (ref 0.3–6.2)
EOSINOPHIL NFR BLD AUTO: 0.5 % (ref 0.3–6.2)
EOSINOPHIL NFR BLD AUTO: 0.6 % (ref 0.3–6.2)
EOSINOPHIL NFR BLD AUTO: 0.7 % (ref 0.3–6.2)
EOSINOPHIL NFR BLD AUTO: 0.8 % (ref 0.3–6.2)
EPAP: 8
ERYTHROCYTE [DISTWIDTH] IN BLOOD BY AUTOMATED COUNT: 11.9 % (ref 12.3–15.4)
ERYTHROCYTE [DISTWIDTH] IN BLOOD BY AUTOMATED COUNT: 12 % (ref 12.3–15.4)
ERYTHROCYTE [DISTWIDTH] IN BLOOD BY AUTOMATED COUNT: 12 % (ref 12.3–15.4)
ERYTHROCYTE [DISTWIDTH] IN BLOOD BY AUTOMATED COUNT: 12.1 % (ref 12.3–15.4)
ERYTHROCYTE [DISTWIDTH] IN BLOOD BY AUTOMATED COUNT: 12.2 % (ref 12.3–15.4)
ERYTHROCYTE [DISTWIDTH] IN BLOOD BY AUTOMATED COUNT: 12.2 % (ref 12.3–15.4)
ERYTHROCYTE [DISTWIDTH] IN BLOOD BY AUTOMATED COUNT: 12.4 % (ref 12.3–15.4)
ERYTHROCYTE [DISTWIDTH] IN BLOOD BY AUTOMATED COUNT: 13.3 % (ref 12.3–15.4)
ERYTHROCYTE [DISTWIDTH] IN BLOOD BY AUTOMATED COUNT: 13.4 % (ref 12.3–15.4)
ERYTHROCYTE [DISTWIDTH] IN BLOOD BY AUTOMATED COUNT: 13.6 % (ref 12.3–15.4)
ERYTHROCYTE [DISTWIDTH] IN BLOOD BY AUTOMATED COUNT: 13.8 % (ref 12.3–15.4)
ERYTHROCYTE [DISTWIDTH] IN BLOOD BY AUTOMATED COUNT: 13.9 % (ref 12.3–15.4)
ERYTHROCYTE [DISTWIDTH] IN BLOOD BY AUTOMATED COUNT: 14.3 % (ref 12.3–15.4)
ERYTHROCYTE [DISTWIDTH] IN BLOOD BY AUTOMATED COUNT: 14.4 % (ref 12.3–15.4)
ERYTHROCYTE [DISTWIDTH] IN BLOOD BY AUTOMATED COUNT: 14.4 % (ref 12.3–15.4)
ERYTHROCYTE [DISTWIDTH] IN BLOOD BY AUTOMATED COUNT: 14.6 % (ref 12.3–15.4)
ERYTHROCYTE [DISTWIDTH] IN BLOOD BY AUTOMATED COUNT: 15 % (ref 12.3–15.4)
ERYTHROCYTE [DISTWIDTH] IN BLOOD BY AUTOMATED COUNT: 15.2 % (ref 12.3–15.4)
ERYTHROCYTE [DISTWIDTH] IN BLOOD BY AUTOMATED COUNT: 15.3 % (ref 12.3–15.4)
ERYTHROCYTE [DISTWIDTH] IN BLOOD BY AUTOMATED COUNT: 15.3 % (ref 12.3–15.4)
ERYTHROCYTE [SEDIMENTATION RATE] IN BLOOD: 20 MM/HR (ref 0–20)
ERYTHROCYTE [SEDIMENTATION RATE] IN BLOOD: 33 MM/HR (ref 0–20)
ERYTHROCYTE [SEDIMENTATION RATE] IN BLOOD: 55 MM/HR (ref 0–20)
ERYTHROCYTE [SEDIMENTATION RATE] IN BLOOD: 60 MM/HR (ref 0–20)
ERYTHROCYTE [SEDIMENTATION RATE] IN BLOOD: 76 MM/HR (ref 0–20)
ERYTHROCYTE [SEDIMENTATION RATE] IN BLOOD: 92 MM/HR (ref 0–20)
FLUAV SUBTYP SPEC NAA+PROBE: NOT DETECTED
FLUBV RNA ISLT QL NAA+PROBE: NOT DETECTED
GAS FLOW AIRWAY: 35 LPM
GAS FLOW AIRWAY: 4 LPM
GAS FLOW AIRWAY: 4.5 LPM
GAS FLOW AIRWAY: 40 LPM
GAS FLOW AIRWAY: 40 LPM
GEN 5 2HR TROPONIN T REFLEX: 1029 NG/L
GEN 5 2HR TROPONIN T REFLEX: 350 NG/L
GEN 5 2HR TROPONIN T REFLEX: 376 NG/L
GEN 5 2HR TROPONIN T REFLEX: 573 NG/L
GLOBULIN UR ELPH-MCNC: 2.4 GM/DL
GLOBULIN UR ELPH-MCNC: 2.4 GM/DL
GLOBULIN UR ELPH-MCNC: 2.8 GM/DL
GLOBULIN UR ELPH-MCNC: 3 GM/DL
GLOBULIN UR ELPH-MCNC: 3.1 GM/DL
GLOBULIN UR ELPH-MCNC: 3.1 GM/DL
GLOBULIN UR ELPH-MCNC: 3.2 GM/DL
GLOBULIN UR ELPH-MCNC: 3.3 GM/DL
GLOBULIN UR ELPH-MCNC: 3.3 GM/DL
GLOBULIN UR ELPH-MCNC: 3.4 GM/DL
GLOBULIN UR ELPH-MCNC: 3.5 GM/DL
GLOBULIN UR ELPH-MCNC: 3.6 GM/DL
GLOBULIN UR ELPH-MCNC: 3.7 GM/DL
GLOBULIN UR ELPH-MCNC: 3.9 GM/DL
GLOBULIN UR ELPH-MCNC: 3.9 GM/DL
GLUCOSE BLDC GLUCOMTR-MCNC: 101 MG/DL (ref 70–130)
GLUCOSE BLDC GLUCOMTR-MCNC: 101 MG/DL (ref 70–130)
GLUCOSE BLDC GLUCOMTR-MCNC: 102 MG/DL (ref 70–130)
GLUCOSE BLDC GLUCOMTR-MCNC: 104 MG/DL (ref 70–130)
GLUCOSE BLDC GLUCOMTR-MCNC: 109 MG/DL (ref 70–130)
GLUCOSE BLDC GLUCOMTR-MCNC: 110 MG/DL (ref 70–130)
GLUCOSE BLDC GLUCOMTR-MCNC: 112 MG/DL (ref 70–130)
GLUCOSE BLDC GLUCOMTR-MCNC: 112 MG/DL (ref 70–130)
GLUCOSE BLDC GLUCOMTR-MCNC: 113 MG/DL (ref 70–130)
GLUCOSE BLDC GLUCOMTR-MCNC: 113 MG/DL (ref 70–130)
GLUCOSE BLDC GLUCOMTR-MCNC: 114 MG/DL (ref 70–130)
GLUCOSE BLDC GLUCOMTR-MCNC: 115 MG/DL (ref 70–130)
GLUCOSE BLDC GLUCOMTR-MCNC: 117 MG/DL (ref 70–130)
GLUCOSE BLDC GLUCOMTR-MCNC: 118 MG/DL (ref 70–130)
GLUCOSE BLDC GLUCOMTR-MCNC: 119 MG/DL (ref 70–130)
GLUCOSE BLDC GLUCOMTR-MCNC: 121 MG/DL (ref 70–130)
GLUCOSE BLDC GLUCOMTR-MCNC: 122 MG/DL (ref 70–130)
GLUCOSE BLDC GLUCOMTR-MCNC: 122 MG/DL (ref 70–130)
GLUCOSE BLDC GLUCOMTR-MCNC: 125 MG/DL (ref 70–130)
GLUCOSE BLDC GLUCOMTR-MCNC: 126 MG/DL (ref 70–130)
GLUCOSE BLDC GLUCOMTR-MCNC: 129 MG/DL (ref 70–130)
GLUCOSE BLDC GLUCOMTR-MCNC: 130 MG/DL (ref 70–130)
GLUCOSE BLDC GLUCOMTR-MCNC: 130 MG/DL (ref 70–130)
GLUCOSE BLDC GLUCOMTR-MCNC: 131 MG/DL (ref 70–130)
GLUCOSE BLDC GLUCOMTR-MCNC: 131 MG/DL (ref 70–130)
GLUCOSE BLDC GLUCOMTR-MCNC: 132 MG/DL (ref 70–130)
GLUCOSE BLDC GLUCOMTR-MCNC: 134 MG/DL (ref 70–130)
GLUCOSE BLDC GLUCOMTR-MCNC: 135 MG/DL (ref 70–130)
GLUCOSE BLDC GLUCOMTR-MCNC: 137 MG/DL (ref 70–130)
GLUCOSE BLDC GLUCOMTR-MCNC: 138 MG/DL (ref 70–130)
GLUCOSE BLDC GLUCOMTR-MCNC: 139 MG/DL (ref 70–130)
GLUCOSE BLDC GLUCOMTR-MCNC: 140 MG/DL (ref 70–130)
GLUCOSE BLDC GLUCOMTR-MCNC: 140 MG/DL (ref 70–130)
GLUCOSE BLDC GLUCOMTR-MCNC: 142 MG/DL (ref 70–130)
GLUCOSE BLDC GLUCOMTR-MCNC: 145 MG/DL (ref 70–130)
GLUCOSE BLDC GLUCOMTR-MCNC: 146 MG/DL (ref 70–130)
GLUCOSE BLDC GLUCOMTR-MCNC: 146 MG/DL (ref 70–130)
GLUCOSE BLDC GLUCOMTR-MCNC: 148 MG/DL (ref 70–130)
GLUCOSE BLDC GLUCOMTR-MCNC: 149 MG/DL (ref 70–130)
GLUCOSE BLDC GLUCOMTR-MCNC: 152 MG/DL (ref 70–130)
GLUCOSE BLDC GLUCOMTR-MCNC: 153 MG/DL (ref 70–130)
GLUCOSE BLDC GLUCOMTR-MCNC: 153 MG/DL (ref 70–130)
GLUCOSE BLDC GLUCOMTR-MCNC: 154 MG/DL (ref 70–130)
GLUCOSE BLDC GLUCOMTR-MCNC: 154 MG/DL (ref 70–130)
GLUCOSE BLDC GLUCOMTR-MCNC: 155 MG/DL (ref 70–130)
GLUCOSE BLDC GLUCOMTR-MCNC: 156 MG/DL (ref 70–130)
GLUCOSE BLDC GLUCOMTR-MCNC: 156 MG/DL (ref 70–130)
GLUCOSE BLDC GLUCOMTR-MCNC: 159 MG/DL (ref 70–130)
GLUCOSE BLDC GLUCOMTR-MCNC: 160 MG/DL (ref 70–130)
GLUCOSE BLDC GLUCOMTR-MCNC: 161 MG/DL (ref 70–130)
GLUCOSE BLDC GLUCOMTR-MCNC: 163 MG/DL (ref 70–130)
GLUCOSE BLDC GLUCOMTR-MCNC: 163 MG/DL (ref 70–130)
GLUCOSE BLDC GLUCOMTR-MCNC: 164 MG/DL (ref 70–130)
GLUCOSE BLDC GLUCOMTR-MCNC: 167 MG/DL (ref 70–130)
GLUCOSE BLDC GLUCOMTR-MCNC: 168 MG/DL (ref 70–130)
GLUCOSE BLDC GLUCOMTR-MCNC: 168 MG/DL (ref 70–130)
GLUCOSE BLDC GLUCOMTR-MCNC: 169 MG/DL (ref 70–130)
GLUCOSE BLDC GLUCOMTR-MCNC: 169 MG/DL (ref 70–130)
GLUCOSE BLDC GLUCOMTR-MCNC: 170 MG/DL (ref 70–130)
GLUCOSE BLDC GLUCOMTR-MCNC: 171 MG/DL (ref 70–130)
GLUCOSE BLDC GLUCOMTR-MCNC: 172 MG/DL (ref 70–130)
GLUCOSE BLDC GLUCOMTR-MCNC: 172 MG/DL (ref 70–130)
GLUCOSE BLDC GLUCOMTR-MCNC: 173 MG/DL (ref 70–130)
GLUCOSE BLDC GLUCOMTR-MCNC: 174 MG/DL (ref 70–130)
GLUCOSE BLDC GLUCOMTR-MCNC: 175 MG/DL (ref 70–130)
GLUCOSE BLDC GLUCOMTR-MCNC: 177 MG/DL (ref 70–130)
GLUCOSE BLDC GLUCOMTR-MCNC: 179 MG/DL (ref 70–130)
GLUCOSE BLDC GLUCOMTR-MCNC: 179 MG/DL (ref 70–130)
GLUCOSE BLDC GLUCOMTR-MCNC: 180 MG/DL (ref 70–130)
GLUCOSE BLDC GLUCOMTR-MCNC: 183 MG/DL (ref 70–130)
GLUCOSE BLDC GLUCOMTR-MCNC: 183 MG/DL (ref 70–130)
GLUCOSE BLDC GLUCOMTR-MCNC: 186 MG/DL (ref 70–130)
GLUCOSE BLDC GLUCOMTR-MCNC: 187 MG/DL (ref 70–130)
GLUCOSE BLDC GLUCOMTR-MCNC: 188 MG/DL (ref 70–130)
GLUCOSE BLDC GLUCOMTR-MCNC: 189 MG/DL (ref 70–130)
GLUCOSE BLDC GLUCOMTR-MCNC: 190 MG/DL (ref 70–130)
GLUCOSE BLDC GLUCOMTR-MCNC: 191 MG/DL (ref 70–130)
GLUCOSE BLDC GLUCOMTR-MCNC: 191 MG/DL (ref 70–130)
GLUCOSE BLDC GLUCOMTR-MCNC: 192 MG/DL (ref 70–130)
GLUCOSE BLDC GLUCOMTR-MCNC: 192 MG/DL (ref 70–130)
GLUCOSE BLDC GLUCOMTR-MCNC: 193 MG/DL (ref 70–130)
GLUCOSE BLDC GLUCOMTR-MCNC: 193 MG/DL (ref 70–130)
GLUCOSE BLDC GLUCOMTR-MCNC: 194 MG/DL (ref 70–130)
GLUCOSE BLDC GLUCOMTR-MCNC: 194 MG/DL (ref 70–130)
GLUCOSE BLDC GLUCOMTR-MCNC: 196 MG/DL (ref 70–130)
GLUCOSE BLDC GLUCOMTR-MCNC: 197 MG/DL (ref 70–130)
GLUCOSE BLDC GLUCOMTR-MCNC: 197 MG/DL (ref 70–130)
GLUCOSE BLDC GLUCOMTR-MCNC: 200 MG/DL (ref 70–130)
GLUCOSE BLDC GLUCOMTR-MCNC: 200 MG/DL (ref 70–130)
GLUCOSE BLDC GLUCOMTR-MCNC: 202 MG/DL (ref 70–130)
GLUCOSE BLDC GLUCOMTR-MCNC: 202 MG/DL (ref 70–130)
GLUCOSE BLDC GLUCOMTR-MCNC: 203 MG/DL (ref 70–130)
GLUCOSE BLDC GLUCOMTR-MCNC: 204 MG/DL (ref 70–130)
GLUCOSE BLDC GLUCOMTR-MCNC: 206 MG/DL (ref 70–130)
GLUCOSE BLDC GLUCOMTR-MCNC: 207 MG/DL (ref 70–130)
GLUCOSE BLDC GLUCOMTR-MCNC: 210 MG/DL (ref 70–130)
GLUCOSE BLDC GLUCOMTR-MCNC: 211 MG/DL (ref 70–130)
GLUCOSE BLDC GLUCOMTR-MCNC: 216 MG/DL (ref 70–130)
GLUCOSE BLDC GLUCOMTR-MCNC: 216 MG/DL (ref 70–130)
GLUCOSE BLDC GLUCOMTR-MCNC: 217 MG/DL (ref 70–130)
GLUCOSE BLDC GLUCOMTR-MCNC: 219 MG/DL (ref 70–130)
GLUCOSE BLDC GLUCOMTR-MCNC: 221 MG/DL (ref 70–130)
GLUCOSE BLDC GLUCOMTR-MCNC: 224 MG/DL (ref 70–130)
GLUCOSE BLDC GLUCOMTR-MCNC: 226 MG/DL (ref 70–130)
GLUCOSE BLDC GLUCOMTR-MCNC: 229 MG/DL (ref 70–130)
GLUCOSE BLDC GLUCOMTR-MCNC: 229 MG/DL (ref 70–130)
GLUCOSE BLDC GLUCOMTR-MCNC: 231 MG/DL (ref 70–130)
GLUCOSE BLDC GLUCOMTR-MCNC: 234 MG/DL (ref 70–130)
GLUCOSE BLDC GLUCOMTR-MCNC: 235 MG/DL (ref 70–130)
GLUCOSE BLDC GLUCOMTR-MCNC: 235 MG/DL (ref 70–130)
GLUCOSE BLDC GLUCOMTR-MCNC: 236 MG/DL (ref 70–130)
GLUCOSE BLDC GLUCOMTR-MCNC: 240 MG/DL (ref 70–130)
GLUCOSE BLDC GLUCOMTR-MCNC: 240 MG/DL (ref 70–130)
GLUCOSE BLDC GLUCOMTR-MCNC: 247 MG/DL (ref 70–130)
GLUCOSE BLDC GLUCOMTR-MCNC: 249 MG/DL (ref 70–130)
GLUCOSE BLDC GLUCOMTR-MCNC: 250 MG/DL (ref 70–130)
GLUCOSE BLDC GLUCOMTR-MCNC: 254 MG/DL (ref 70–130)
GLUCOSE BLDC GLUCOMTR-MCNC: 256 MG/DL (ref 70–130)
GLUCOSE BLDC GLUCOMTR-MCNC: 258 MG/DL (ref 70–130)
GLUCOSE BLDC GLUCOMTR-MCNC: 267 MG/DL (ref 70–130)
GLUCOSE BLDC GLUCOMTR-MCNC: 268 MG/DL (ref 70–130)
GLUCOSE BLDC GLUCOMTR-MCNC: 271 MG/DL (ref 70–130)
GLUCOSE BLDC GLUCOMTR-MCNC: 272 MG/DL (ref 70–130)
GLUCOSE BLDC GLUCOMTR-MCNC: 274 MG/DL (ref 70–130)
GLUCOSE BLDC GLUCOMTR-MCNC: 275 MG/DL (ref 70–130)
GLUCOSE BLDC GLUCOMTR-MCNC: 275 MG/DL (ref 70–130)
GLUCOSE BLDC GLUCOMTR-MCNC: 276 MG/DL (ref 70–130)
GLUCOSE BLDC GLUCOMTR-MCNC: 282 MG/DL (ref 70–130)
GLUCOSE BLDC GLUCOMTR-MCNC: 283 MG/DL (ref 70–130)
GLUCOSE BLDC GLUCOMTR-MCNC: 287 MG/DL (ref 70–130)
GLUCOSE BLDC GLUCOMTR-MCNC: 288 MG/DL (ref 70–130)
GLUCOSE BLDC GLUCOMTR-MCNC: 291 MG/DL (ref 70–130)
GLUCOSE BLDC GLUCOMTR-MCNC: 291 MG/DL (ref 70–130)
GLUCOSE BLDC GLUCOMTR-MCNC: 293 MG/DL (ref 70–130)
GLUCOSE BLDC GLUCOMTR-MCNC: 294 MG/DL (ref 70–130)
GLUCOSE BLDC GLUCOMTR-MCNC: 295 MG/DL (ref 70–130)
GLUCOSE BLDC GLUCOMTR-MCNC: 304 MG/DL (ref 70–130)
GLUCOSE BLDC GLUCOMTR-MCNC: 304 MG/DL (ref 70–130)
GLUCOSE BLDC GLUCOMTR-MCNC: 307 MG/DL (ref 70–130)
GLUCOSE BLDC GLUCOMTR-MCNC: 307 MG/DL (ref 70–130)
GLUCOSE BLDC GLUCOMTR-MCNC: 312 MG/DL (ref 70–130)
GLUCOSE BLDC GLUCOMTR-MCNC: 315 MG/DL (ref 70–130)
GLUCOSE BLDC GLUCOMTR-MCNC: 316 MG/DL (ref 70–130)
GLUCOSE BLDC GLUCOMTR-MCNC: 322 MG/DL (ref 70–130)
GLUCOSE BLDC GLUCOMTR-MCNC: 328 MG/DL (ref 70–130)
GLUCOSE BLDC GLUCOMTR-MCNC: 329 MG/DL (ref 70–130)
GLUCOSE BLDC GLUCOMTR-MCNC: 330 MG/DL (ref 70–130)
GLUCOSE BLDC GLUCOMTR-MCNC: 339 MG/DL (ref 70–130)
GLUCOSE BLDC GLUCOMTR-MCNC: 339 MG/DL (ref 70–130)
GLUCOSE BLDC GLUCOMTR-MCNC: 347 MG/DL (ref 70–130)
GLUCOSE BLDC GLUCOMTR-MCNC: 354 MG/DL (ref 70–130)
GLUCOSE BLDC GLUCOMTR-MCNC: 361 MG/DL (ref 70–130)
GLUCOSE BLDC GLUCOMTR-MCNC: 370 MG/DL (ref 70–130)
GLUCOSE BLDC GLUCOMTR-MCNC: 371 MG/DL (ref 70–130)
GLUCOSE BLDC GLUCOMTR-MCNC: 373 MG/DL (ref 70–130)
GLUCOSE BLDC GLUCOMTR-MCNC: 384 MG/DL (ref 70–130)
GLUCOSE BLDC GLUCOMTR-MCNC: 431 MG/DL (ref 70–130)
GLUCOSE BLDC GLUCOMTR-MCNC: 442 MG/DL (ref 70–130)
GLUCOSE BLDC GLUCOMTR-MCNC: 52 MG/DL (ref 70–130)
GLUCOSE BLDC GLUCOMTR-MCNC: 57 MG/DL (ref 70–130)
GLUCOSE BLDC GLUCOMTR-MCNC: 58 MG/DL (ref 70–130)
GLUCOSE BLDC GLUCOMTR-MCNC: 60 MG/DL (ref 70–130)
GLUCOSE BLDC GLUCOMTR-MCNC: 60 MG/DL (ref 70–130)
GLUCOSE BLDC GLUCOMTR-MCNC: 64 MG/DL (ref 70–130)
GLUCOSE BLDC GLUCOMTR-MCNC: 66 MG/DL (ref 70–130)
GLUCOSE BLDC GLUCOMTR-MCNC: 71 MG/DL (ref 70–130)
GLUCOSE BLDC GLUCOMTR-MCNC: 71 MG/DL (ref 70–130)
GLUCOSE BLDC GLUCOMTR-MCNC: 72 MG/DL (ref 70–130)
GLUCOSE BLDC GLUCOMTR-MCNC: 72 MG/DL (ref 70–130)
GLUCOSE BLDC GLUCOMTR-MCNC: 75 MG/DL (ref 70–130)
GLUCOSE BLDC GLUCOMTR-MCNC: 75 MG/DL (ref 70–130)
GLUCOSE BLDC GLUCOMTR-MCNC: 78 MG/DL (ref 70–130)
GLUCOSE BLDC GLUCOMTR-MCNC: 79 MG/DL (ref 70–130)
GLUCOSE BLDC GLUCOMTR-MCNC: 80 MG/DL (ref 70–130)
GLUCOSE BLDC GLUCOMTR-MCNC: 81 MG/DL (ref 70–130)
GLUCOSE BLDC GLUCOMTR-MCNC: 82 MG/DL (ref 70–130)
GLUCOSE BLDC GLUCOMTR-MCNC: 84 MG/DL (ref 70–130)
GLUCOSE BLDC GLUCOMTR-MCNC: 85 MG/DL (ref 70–130)
GLUCOSE BLDC GLUCOMTR-MCNC: 87 MG/DL (ref 70–130)
GLUCOSE BLDC GLUCOMTR-MCNC: 87 MG/DL (ref 70–130)
GLUCOSE BLDC GLUCOMTR-MCNC: 88 MG/DL (ref 70–130)
GLUCOSE BLDC GLUCOMTR-MCNC: 92 MG/DL (ref 70–130)
GLUCOSE BLDC GLUCOMTR-MCNC: 94 MG/DL (ref 70–130)
GLUCOSE BLDC GLUCOMTR-MCNC: 97 MG/DL (ref 70–130)
GLUCOSE CSF-MCNC: 119 MG/DL (ref 40–70)
GLUCOSE SERPL-MCNC: 102 MG/DL (ref 65–99)
GLUCOSE SERPL-MCNC: 115 MG/DL (ref 65–99)
GLUCOSE SERPL-MCNC: 135 MG/DL (ref 65–99)
GLUCOSE SERPL-MCNC: 139 MG/DL (ref 65–99)
GLUCOSE SERPL-MCNC: 148 MG/DL (ref 65–99)
GLUCOSE SERPL-MCNC: 152 MG/DL (ref 65–99)
GLUCOSE SERPL-MCNC: 159 MG/DL (ref 65–99)
GLUCOSE SERPL-MCNC: 165 MG/DL (ref 65–99)
GLUCOSE SERPL-MCNC: 166 MG/DL (ref 65–99)
GLUCOSE SERPL-MCNC: 178 MG/DL (ref 65–99)
GLUCOSE SERPL-MCNC: 190 MG/DL (ref 65–99)
GLUCOSE SERPL-MCNC: 194 MG/DL (ref 65–99)
GLUCOSE SERPL-MCNC: 196 MG/DL (ref 65–99)
GLUCOSE SERPL-MCNC: 199 MG/DL (ref 65–99)
GLUCOSE SERPL-MCNC: 201 MG/DL (ref 65–99)
GLUCOSE SERPL-MCNC: 205 MG/DL (ref 65–99)
GLUCOSE SERPL-MCNC: 212 MG/DL (ref 65–99)
GLUCOSE SERPL-MCNC: 231 MG/DL (ref 65–99)
GLUCOSE SERPL-MCNC: 232 MG/DL (ref 65–99)
GLUCOSE SERPL-MCNC: 232 MG/DL (ref 65–99)
GLUCOSE SERPL-MCNC: 240 MG/DL (ref 65–99)
GLUCOSE SERPL-MCNC: 243 MG/DL (ref 65–99)
GLUCOSE SERPL-MCNC: 261 MG/DL (ref 65–99)
GLUCOSE SERPL-MCNC: 263 MG/DL (ref 65–99)
GLUCOSE SERPL-MCNC: 265 MG/DL (ref 65–99)
GLUCOSE SERPL-MCNC: 281 MG/DL (ref 65–99)
GLUCOSE SERPL-MCNC: 331 MG/DL (ref 65–99)
GLUCOSE SERPL-MCNC: 331 MG/DL (ref 65–99)
GLUCOSE SERPL-MCNC: 343 MG/DL (ref 65–99)
GLUCOSE SERPL-MCNC: 408 MG/DL (ref 65–99)
GLUCOSE SERPL-MCNC: 436 MG/DL (ref 65–99)
GLUCOSE SERPL-MCNC: 69 MG/DL (ref 65–99)
GLUCOSE SERPL-MCNC: 70 MG/DL (ref 65–99)
GLUCOSE SERPL-MCNC: 95 MG/DL (ref 65–99)
GLUCOSE SERPL-MCNC: 97 MG/DL (ref 65–99)
GLUCOSE UR STRIP-MCNC: ABNORMAL MG/DL
GLUCOSE UR STRIP-MCNC: NEGATIVE MG/DL
GLUCOSE UR STRIP-MCNC: NEGATIVE MG/DL
GRAM STN SPEC: ABNORMAL
GRAM STN SPEC: NORMAL
GRAN CASTS URNS QL MICRO: ABNORMAL /LPF
HADV DNA SPEC NAA+PROBE: NOT DETECTED
HBA1C MFR BLD: 11 % (ref 4.8–5.6)
HBA1C MFR BLD: 11.2 % (ref 4.8–5.6)
HCO3 BLDA-SCNC: 17.3 MMOL/L (ref 20–26)
HCO3 BLDA-SCNC: 17.6 MMOL/L (ref 20–26)
HCO3 BLDA-SCNC: 21.3 MMOL/L (ref 20–26)
HCO3 BLDA-SCNC: 22.2 MMOL/L (ref 20–26)
HCO3 BLDA-SCNC: 22.3 MMOL/L (ref 20–26)
HCO3 BLDA-SCNC: 22.8 MMOL/L (ref 20–26)
HCO3 BLDA-SCNC: 24.6 MMOL/L (ref 20–26)
HCO3 BLDA-SCNC: 26.1 MMOL/L (ref 20–26)
HCO3 BLDA-SCNC: 27.7 MMOL/L (ref 20–26)
HCO3 BLDA-SCNC: 27.8 MMOL/L (ref 20–26)
HCO3 BLDA-SCNC: 27.9 MMOL/L (ref 20–26)
HCO3 BLDA-SCNC: 29.2 MMOL/L (ref 20–26)
HCO3 BLDA-SCNC: 30.7 MMOL/L (ref 20–26)
HCO3 BLDA-SCNC: 31.5 MMOL/L (ref 20–26)
HCO3 BLDA-SCNC: 31.8 MMOL/L (ref 20–26)
HCO3 BLDA-SCNC: 32.1 MMOL/L (ref 20–26)
HCO3 BLDA-SCNC: 32.7 MMOL/L (ref 20–26)
HCO3 BLDA-SCNC: 33 MMOL/L (ref 20–26)
HCO3 BLDA-SCNC: 34.3 MMOL/L (ref 20–26)
HCO3 BLDA-SCNC: 34.5 MMOL/L (ref 20–26)
HCO3 BLDA-SCNC: 34.8 MMOL/L (ref 20–26)
HCO3 BLDA-SCNC: 35.8 MMOL/L (ref 20–26)
HCO3 BLDA-SCNC: 35.8 MMOL/L (ref 20–26)
HCO3 BLDA-SCNC: 36.5 MMOL/L (ref 20–26)
HCO3 BLDA-SCNC: 36.6 MMOL/L (ref 20–26)
HCO3 BLDA-SCNC: 36.7 MMOL/L (ref 20–26)
HCO3 BLDA-SCNC: 36.8 MMOL/L (ref 20–26)
HCOV 229E RNA SPEC QL NAA+PROBE: NOT DETECTED
HCOV HKU1 RNA SPEC QL NAA+PROBE: NOT DETECTED
HCOV NL63 RNA SPEC QL NAA+PROBE: NOT DETECTED
HCOV OC43 RNA SPEC QL NAA+PROBE: NOT DETECTED
HCT VFR BLD AUTO: 28.3 % (ref 37.5–51)
HCT VFR BLD AUTO: 29 % (ref 37.5–51)
HCT VFR BLD AUTO: 29.6 % (ref 37.5–51)
HCT VFR BLD AUTO: 29.7 % (ref 37.5–51)
HCT VFR BLD AUTO: 30.1 % (ref 37.5–51)
HCT VFR BLD AUTO: 30.4 % (ref 37.5–51)
HCT VFR BLD AUTO: 31.1 % (ref 37.5–51)
HCT VFR BLD AUTO: 31.6 % (ref 37.5–51)
HCT VFR BLD AUTO: 31.9 % (ref 37.5–51)
HCT VFR BLD AUTO: 33.1 % (ref 37.5–51)
HCT VFR BLD AUTO: 33.3 % (ref 37.5–51)
HCT VFR BLD AUTO: 33.4 % (ref 37.5–51)
HCT VFR BLD AUTO: 33.5 % (ref 37.5–51)
HCT VFR BLD AUTO: 33.8 % (ref 37.5–51)
HCT VFR BLD AUTO: 34.5 % (ref 37.5–51)
HCT VFR BLD AUTO: 34.5 % (ref 37.5–51)
HCT VFR BLD AUTO: 34.8 % (ref 37.5–51)
HCT VFR BLD AUTO: 36.1 % (ref 37.5–51)
HCT VFR BLD AUTO: 36.6 % (ref 37.5–51)
HCT VFR BLD AUTO: 42.2 % (ref 37.5–51)
HGB BLD-MCNC: 10 G/DL (ref 13–17.7)
HGB BLD-MCNC: 10.1 G/DL (ref 13–17.7)
HGB BLD-MCNC: 10.2 G/DL (ref 13–17.7)
HGB BLD-MCNC: 10.2 G/DL (ref 13–17.7)
HGB BLD-MCNC: 10.7 G/DL (ref 13–17.7)
HGB BLD-MCNC: 10.8 G/DL (ref 13–17.7)
HGB BLD-MCNC: 11 G/DL (ref 13–17.7)
HGB BLD-MCNC: 11 G/DL (ref 13–17.7)
HGB BLD-MCNC: 11.1 G/DL (ref 13–17.7)
HGB BLD-MCNC: 11.6 G/DL (ref 13–17.7)
HGB BLD-MCNC: 11.9 G/DL (ref 13–17.7)
HGB BLD-MCNC: 13.3 G/DL (ref 13–17.7)
HGB BLD-MCNC: 8.6 G/DL (ref 13–17.7)
HGB BLD-MCNC: 8.7 G/DL (ref 13–17.7)
HGB BLD-MCNC: 9 G/DL (ref 13–17.7)
HGB BLD-MCNC: 9.1 G/DL (ref 13–17.7)
HGB BLD-MCNC: 9.2 G/DL (ref 13–17.7)
HGB BLD-MCNC: 9.2 G/DL (ref 13–17.7)
HGB BLD-MCNC: 9.3 G/DL (ref 13–17.7)
HGB BLD-MCNC: 9.7 G/DL (ref 13–17.7)
HGB UR QL STRIP.AUTO: ABNORMAL
HGB UR QL STRIP.AUTO: NEGATIVE
HGB UR QL STRIP.AUTO: NEGATIVE
HMPV RNA NPH QL NAA+NON-PROBE: NOT DETECTED
HOLD SPECIMEN: NORMAL
HPIV1 RNA ISLT QL NAA+PROBE: NOT DETECTED
HPIV2 RNA SPEC QL NAA+PROBE: NOT DETECTED
HPIV3 RNA NPH QL NAA+PROBE: NOT DETECTED
HPIV4 P GENE NPH QL NAA+PROBE: NOT DETECTED
HYALINE CASTS UR QL AUTO: ABNORMAL /LPF
HYPOCHROMIA BLD QL: ABNORMAL
IMM GRANULOCYTES # BLD AUTO: 0.04 10*3/MM3 (ref 0–0.05)
IMM GRANULOCYTES # BLD AUTO: 0.08 10*3/MM3 (ref 0–0.05)
IMM GRANULOCYTES # BLD AUTO: 0.09 10*3/MM3 (ref 0–0.05)
IMM GRANULOCYTES # BLD AUTO: 0.1 10*3/MM3 (ref 0–0.05)
IMM GRANULOCYTES # BLD AUTO: 0.11 10*3/MM3 (ref 0–0.05)
IMM GRANULOCYTES # BLD AUTO: 0.13 10*3/MM3 (ref 0–0.05)
IMM GRANULOCYTES # BLD AUTO: 0.14 10*3/MM3 (ref 0–0.05)
IMM GRANULOCYTES # BLD AUTO: 0.17 10*3/MM3 (ref 0–0.05)
IMM GRANULOCYTES # BLD AUTO: 0.21 10*3/MM3 (ref 0–0.05)
IMM GRANULOCYTES # BLD AUTO: 0.31 10*3/MM3 (ref 0–0.05)
IMM GRANULOCYTES NFR BLD AUTO: 0.4 % (ref 0–0.5)
IMM GRANULOCYTES NFR BLD AUTO: 0.5 % (ref 0–0.5)
IMM GRANULOCYTES NFR BLD AUTO: 0.7 % (ref 0–0.5)
IMM GRANULOCYTES NFR BLD AUTO: 0.7 % (ref 0–0.5)
IMM GRANULOCYTES NFR BLD AUTO: 0.8 % (ref 0–0.5)
IMM GRANULOCYTES NFR BLD AUTO: 0.9 % (ref 0–0.5)
IMM GRANULOCYTES NFR BLD AUTO: 1.1 % (ref 0–0.5)
IMM GRANULOCYTES NFR BLD AUTO: 1.5 % (ref 0–0.5)
INHALED O2 CONCENTRATION: 100 %
INHALED O2 CONCENTRATION: 30 %
INHALED O2 CONCENTRATION: 40 %
INHALED O2 CONCENTRATION: 50 %
INHALED O2 CONCENTRATION: 60 %
INHALED O2 CONCENTRATION: 60 %
INHALED O2 CONCENTRATION: 70 %
INHALED O2 CONCENTRATION: 70 %
INHALED O2 CONCENTRATION: 80 %
INHALED O2 CONCENTRATION: 80 %
INR PPP: 1.16 (ref 0.91–1.09)
INR PPP: 1.22 (ref 0.91–1.09)
IPAP: 16
KETONES UR QL STRIP: ABNORMAL
KETONES UR QL STRIP: ABNORMAL
KETONES UR QL STRIP: NEGATIVE
L PNEUMO1 AG UR QL IA: NEGATIVE
LEFT ATRIUM VOLUME INDEX: 31 ML/M2
LEFT ATRIUM VOLUME: 72.2 ML
LEUKOCYTE ESTERASE UR QL STRIP.AUTO: ABNORMAL
LEUKOCYTE ESTERASE UR QL STRIP.AUTO: ABNORMAL
LEUKOCYTE ESTERASE UR QL STRIP.AUTO: NEGATIVE
LYMPHOCYTES # BLD AUTO: 0.99 10*3/MM3 (ref 0.7–3.1)
LYMPHOCYTES # BLD AUTO: 1.55 10*3/MM3 (ref 0.7–3.1)
LYMPHOCYTES # BLD AUTO: 1.59 10*3/MM3 (ref 0.7–3.1)
LYMPHOCYTES # BLD AUTO: 1.6 10*3/MM3 (ref 0.7–3.1)
LYMPHOCYTES # BLD AUTO: 1.66 10*3/MM3 (ref 0.7–3.1)
LYMPHOCYTES # BLD AUTO: 1.82 10*3/MM3 (ref 0.7–3.1)
LYMPHOCYTES # BLD AUTO: 2.14 10*3/MM3 (ref 0.7–3.1)
LYMPHOCYTES # BLD AUTO: 2.58 10*3/MM3 (ref 0.7–3.1)
LYMPHOCYTES # BLD AUTO: 2.79 10*3/MM3 (ref 0.7–3.1)
LYMPHOCYTES # BLD AUTO: 3.16 10*3/MM3 (ref 0.7–3.1)
LYMPHOCYTES # BLD MANUAL: 2.73 10*3/MM3 (ref 0.7–3.1)
LYMPHOCYTES NFR BLD AUTO: 10.6 % (ref 19.6–45.3)
LYMPHOCYTES NFR BLD AUTO: 11.8 % (ref 19.6–45.3)
LYMPHOCYTES NFR BLD AUTO: 12.9 % (ref 19.6–45.3)
LYMPHOCYTES NFR BLD AUTO: 13.5 % (ref 19.6–45.3)
LYMPHOCYTES NFR BLD AUTO: 14.9 % (ref 19.6–45.3)
LYMPHOCYTES NFR BLD AUTO: 15.9 % (ref 19.6–45.3)
LYMPHOCYTES NFR BLD AUTO: 16.7 % (ref 19.6–45.3)
LYMPHOCYTES NFR BLD AUTO: 20.6 % (ref 19.6–45.3)
LYMPHOCYTES NFR BLD AUTO: 7.5 % (ref 19.6–45.3)
LYMPHOCYTES NFR BLD AUTO: 9 % (ref 19.6–45.3)
LYMPHOCYTES NFR BLD MANUAL: 6 % (ref 5–12)
Lab: ABNORMAL
M PNEUMO IGG SER IA-ACNC: NOT DETECTED
MAGNESIUM SERPL-MCNC: 2.1 MG/DL (ref 1.6–2.4)
MAGNESIUM SERPL-MCNC: 2.3 MG/DL (ref 1.6–2.4)
MAGNESIUM SERPL-MCNC: 4 MG/DL (ref 1.6–2.4)
MAXIMAL PREDICTED HEART RATE: 153 BPM
MAXIMAL PREDICTED HEART RATE: 153 BPM
MCH RBC QN AUTO: 28.9 PG (ref 26.6–33)
MCH RBC QN AUTO: 29 PG (ref 26.6–33)
MCH RBC QN AUTO: 29.2 PG (ref 26.6–33)
MCH RBC QN AUTO: 29.5 PG (ref 26.6–33)
MCH RBC QN AUTO: 29.5 PG (ref 26.6–33)
MCH RBC QN AUTO: 29.6 PG (ref 26.6–33)
MCH RBC QN AUTO: 29.7 PG (ref 26.6–33)
MCH RBC QN AUTO: 29.8 PG (ref 26.6–33)
MCH RBC QN AUTO: 29.9 PG (ref 26.6–33)
MCH RBC QN AUTO: 30 PG (ref 26.6–33)
MCH RBC QN AUTO: 30.1 PG (ref 26.6–33)
MCH RBC QN AUTO: 30.2 PG (ref 26.6–33)
MCH RBC QN AUTO: 30.2 PG (ref 26.6–33)
MCH RBC QN AUTO: 30.3 PG (ref 26.6–33)
MCH RBC QN AUTO: 30.4 PG (ref 26.6–33)
MCH RBC QN AUTO: 30.4 PG (ref 26.6–33)
MCH RBC QN AUTO: 30.5 PG (ref 26.6–33)
MCH RBC QN AUTO: 30.8 PG (ref 26.6–33)
MCHC RBC AUTO-ENTMCNC: 29.2 G/DL (ref 31.5–35.7)
MCHC RBC AUTO-ENTMCNC: 30 G/DL (ref 31.5–35.7)
MCHC RBC AUTO-ENTMCNC: 30.1 G/DL (ref 31.5–35.7)
MCHC RBC AUTO-ENTMCNC: 30.2 G/DL (ref 31.5–35.7)
MCHC RBC AUTO-ENTMCNC: 30.3 G/DL (ref 31.5–35.7)
MCHC RBC AUTO-ENTMCNC: 30.4 G/DL (ref 31.5–35.7)
MCHC RBC AUTO-ENTMCNC: 30.4 G/DL (ref 31.5–35.7)
MCHC RBC AUTO-ENTMCNC: 30.5 G/DL (ref 31.5–35.7)
MCHC RBC AUTO-ENTMCNC: 30.7 G/DL (ref 31.5–35.7)
MCHC RBC AUTO-ENTMCNC: 31 G/DL (ref 31.5–35.7)
MCHC RBC AUTO-ENTMCNC: 31.2 G/DL (ref 31.5–35.7)
MCHC RBC AUTO-ENTMCNC: 31.5 G/DL (ref 31.5–35.7)
MCHC RBC AUTO-ENTMCNC: 31.9 G/DL (ref 31.5–35.7)
MCHC RBC AUTO-ENTMCNC: 31.9 G/DL (ref 31.5–35.7)
MCHC RBC AUTO-ENTMCNC: 32.4 G/DL (ref 31.5–35.7)
MCHC RBC AUTO-ENTMCNC: 32.5 G/DL (ref 31.5–35.7)
MCHC RBC AUTO-ENTMCNC: 33.3 G/DL (ref 31.5–35.7)
MCHC RBC AUTO-ENTMCNC: 33.9 G/DL (ref 31.5–35.7)
MCV RBC AUTO: 100 FL (ref 79–97)
MCV RBC AUTO: 91.1 FL (ref 79–97)
MCV RBC AUTO: 91.1 FL (ref 79–97)
MCV RBC AUTO: 92.7 FL (ref 79–97)
MCV RBC AUTO: 93 FL (ref 79–97)
MCV RBC AUTO: 93.8 FL (ref 79–97)
MCV RBC AUTO: 95 FL (ref 79–97)
MCV RBC AUTO: 95.3 FL (ref 79–97)
MCV RBC AUTO: 95.9 FL (ref 79–97)
MCV RBC AUTO: 96.3 FL (ref 79–97)
MCV RBC AUTO: 96.4 FL (ref 79–97)
MCV RBC AUTO: 97.4 FL (ref 79–97)
MCV RBC AUTO: 97.6 FL (ref 79–97)
MCV RBC AUTO: 97.7 FL (ref 79–97)
MCV RBC AUTO: 97.7 FL (ref 79–97)
MCV RBC AUTO: 97.8 FL (ref 79–97)
MCV RBC AUTO: 98.5 FL (ref 79–97)
MCV RBC AUTO: 98.8 FL (ref 79–97)
METHOD: ABNORMAL
MODALITY: ABNORMAL
MONOCYTES # BLD AUTO: 0.66 10*3/MM3 (ref 0.1–0.9)
MONOCYTES # BLD AUTO: 0.84 10*3/MM3 (ref 0.1–0.9)
MONOCYTES # BLD AUTO: 0.98 10*3/MM3 (ref 0.1–0.9)
MONOCYTES # BLD AUTO: 1.05 10*3/MM3 (ref 0.1–0.9)
MONOCYTES # BLD AUTO: 1.15 10*3/MM3 (ref 0.1–0.9)
MONOCYTES # BLD AUTO: 1.28 10*3/MM3 (ref 0.1–0.9)
MONOCYTES # BLD AUTO: 1.3 10*3/MM3 (ref 0.1–0.9)
MONOCYTES # BLD AUTO: 1.37 10*3/MM3 (ref 0.1–0.9)
MONOCYTES # BLD AUTO: 1.48 10*3/MM3 (ref 0.1–0.9)
MONOCYTES # BLD AUTO: 2.15 10*3/MM3 (ref 0.1–0.9)
MONOCYTES # BLD: 1.02 10*3/MM3 (ref 0.1–0.9)
MONOCYTES NFR BLD AUTO: 10.1 % (ref 5–12)
MONOCYTES NFR BLD AUTO: 10.1 % (ref 5–12)
MONOCYTES NFR BLD AUTO: 11.3 % (ref 5–12)
MONOCYTES NFR BLD AUTO: 4.9 % (ref 5–12)
MONOCYTES NFR BLD AUTO: 6.6 % (ref 5–12)
MONOCYTES NFR BLD AUTO: 6.9 % (ref 5–12)
MONOCYTES NFR BLD AUTO: 7 % (ref 5–12)
MONOCYTES NFR BLD AUTO: 7.8 % (ref 5–12)
MONOCYTES NFR BLD AUTO: 8.1 % (ref 5–12)
MONOCYTES NFR BLD AUTO: 8.7 % (ref 5–12)
MRSA DNA SPEC QL NAA+PROBE: ABNORMAL
MRSA DNA SPEC QL NAA+PROBE: NORMAL
N GONORRHOEA RRNA SPEC QL NAA+PROBE: NOT DETECTED
NEUTROPHILS # BLD AUTO: 13.31 10*3/MM3 (ref 1.7–7)
NEUTROPHILS NFR BLD AUTO: 10.49 10*3/MM3 (ref 1.7–7)
NEUTROPHILS NFR BLD AUTO: 10.86 10*3/MM3 (ref 1.7–7)
NEUTROPHILS NFR BLD AUTO: 12.42 10*3/MM3 (ref 1.7–7)
NEUTROPHILS NFR BLD AUTO: 14.02 10*3/MM3 (ref 1.7–7)
NEUTROPHILS NFR BLD AUTO: 14.37 10*3/MM3 (ref 1.7–7)
NEUTROPHILS NFR BLD AUTO: 15.29 10*3/MM3 (ref 1.7–7)
NEUTROPHILS NFR BLD AUTO: 16.18 10*3/MM3 (ref 1.7–7)
NEUTROPHILS NFR BLD AUTO: 68 % (ref 42.7–76)
NEUTROPHILS NFR BLD AUTO: 7.06 10*3/MM3 (ref 1.7–7)
NEUTROPHILS NFR BLD AUTO: 7.59 10*3/MM3 (ref 1.7–7)
NEUTROPHILS NFR BLD AUTO: 73.6 % (ref 42.7–76)
NEUTROPHILS NFR BLD AUTO: 74.3 % (ref 42.7–76)
NEUTROPHILS NFR BLD AUTO: 75.9 % (ref 42.7–76)
NEUTROPHILS NFR BLD AUTO: 76.1 % (ref 42.7–76)
NEUTROPHILS NFR BLD AUTO: 76.8 % (ref 42.7–76)
NEUTROPHILS NFR BLD AUTO: 77 % (ref 42.7–76)
NEUTROPHILS NFR BLD AUTO: 81.8 % (ref 42.7–76)
NEUTROPHILS NFR BLD AUTO: 82.8 % (ref 42.7–76)
NEUTROPHILS NFR BLD AUTO: 83.8 % (ref 42.7–76)
NEUTROPHILS NFR BLD AUTO: 9.25 10*3/MM3 (ref 1.7–7)
NEUTROPHILS NFR BLD MANUAL: 73 % (ref 42.7–76)
NEUTS BAND NFR BLD MANUAL: 5 % (ref 0–5)
NITRITE UR QL STRIP: NEGATIVE
NITRITE UR QL STRIP: NEGATIVE
NITRITE UR QL STRIP: POSITIVE
NOTIFIED BY: ABNORMAL
NOTIFIED WHO: ABNORMAL
NRBC BLD AUTO-RTO: 0 /100 WBC (ref 0–0.2)
NRBC BLD AUTO-RTO: 0.5 /100 WBC (ref 0–0.2)
NRBC BLD AUTO-RTO: 0.7 /100 WBC (ref 0–0.2)
NRBC BLD AUTO-RTO: 0.9 /100 WBC (ref 0–0.2)
NRBC BLD AUTO-RTO: 1.6 /100 WBC (ref 0–0.2)
NT-PROBNP SERPL-MCNC: ABNORMAL PG/ML (ref 0–900)
NUC CELL # CSF MANUAL: 3 /MM3 (ref 0–8)
OSMOLALITY SERPL: 311 MOSM/KG (ref 280–301)
P-ANCA ATYPICAL TITR SER IF: NORMAL TITER
P-ANCA TITR SER IF: NORMAL TITER
PAW @ PEAK INSP FLOW SETTING VENT: 12 CMH2O
PAW @ PEAK INSP FLOW SETTING VENT: 12 CMH2O
PAW @ PEAK INSP FLOW SETTING VENT: 20 CMH2O
PCO2 BLDA: 28.3 MM HG (ref 35–45)
PCO2 BLDA: 30.4 MM HG (ref 35–45)
PCO2 BLDA: 30.7 MM HG (ref 35–45)
PCO2 BLDA: 32 MM HG (ref 35–45)
PCO2 BLDA: 32.9 MM HG (ref 35–45)
PCO2 BLDA: 35.8 MM HG (ref 35–45)
PCO2 BLDA: 36.9 MM HG (ref 35–45)
PCO2 BLDA: 39.4 MM HG (ref 35–45)
PCO2 BLDA: 40.8 MM HG (ref 35–45)
PCO2 BLDA: 41 MM HG (ref 35–45)
PCO2 BLDA: 41.1 MM HG (ref 35–45)
PCO2 BLDA: 41.2 MM HG (ref 35–45)
PCO2 BLDA: 41.6 MM HG (ref 35–45)
PCO2 BLDA: 41.9 MM HG (ref 35–45)
PCO2 BLDA: 42.9 MM HG (ref 35–45)
PCO2 BLDA: 43 MM HG (ref 35–45)
PCO2 BLDA: 43.8 MM HG (ref 35–45)
PCO2 BLDA: 44.6 MM HG (ref 35–45)
PCO2 BLDA: 44.6 MM HG (ref 35–45)
PCO2 BLDA: 45 MM HG (ref 35–45)
PCO2 BLDA: 45.7 MM HG (ref 35–45)
PCO2 BLDA: 46 MM HG (ref 35–45)
PCO2 BLDA: 46.9 MM HG (ref 35–45)
PCO2 BLDA: 47.7 MM HG (ref 35–45)
PCO2 BLDA: 51 MM HG (ref 35–45)
PCO2 BLDA: 59.8 MM HG (ref 35–45)
PCO2 BLDA: 71.6 MM HG (ref 35–45)
PCO2 TEMP ADJ BLD: 28.3 MM HG (ref 35–45)
PCO2 TEMP ADJ BLD: 30.4 MM HG (ref 35–45)
PCO2 TEMP ADJ BLD: 30.7 MM HG (ref 35–45)
PCO2 TEMP ADJ BLD: 32 MM HG (ref 35–45)
PCO2 TEMP ADJ BLD: 32.9 MM HG (ref 35–45)
PCO2 TEMP ADJ BLD: 35.8 MM HG (ref 35–45)
PCO2 TEMP ADJ BLD: 36.9 MM HG (ref 35–45)
PCO2 TEMP ADJ BLD: 39.4 MM HG (ref 35–45)
PCO2 TEMP ADJ BLD: 40.8 MM HG (ref 35–45)
PCO2 TEMP ADJ BLD: 41 MM HG (ref 35–45)
PCO2 TEMP ADJ BLD: 41.1 MM HG (ref 35–45)
PCO2 TEMP ADJ BLD: 41.2 MM HG (ref 35–45)
PCO2 TEMP ADJ BLD: 41.6 MM HG (ref 35–45)
PCO2 TEMP ADJ BLD: 41.9 MM HG (ref 35–45)
PCO2 TEMP ADJ BLD: 42.9 MM HG (ref 35–45)
PCO2 TEMP ADJ BLD: 43 MM HG (ref 35–45)
PCO2 TEMP ADJ BLD: 43.8 MM HG (ref 35–45)
PCO2 TEMP ADJ BLD: 44.6 MM HG (ref 35–45)
PCO2 TEMP ADJ BLD: 44.6 MM HG (ref 35–45)
PCO2 TEMP ADJ BLD: 45 MM HG (ref 35–45)
PCO2 TEMP ADJ BLD: 45.7 MM HG (ref 35–45)
PCO2 TEMP ADJ BLD: 46 MM HG (ref 35–45)
PCO2 TEMP ADJ BLD: 46.9 MM HG (ref 35–45)
PCO2 TEMP ADJ BLD: 47.7 MM HG (ref 35–45)
PCO2 TEMP ADJ BLD: 51 MM HG (ref 35–45)
PCO2 TEMP ADJ BLD: 59.8 MM HG (ref 35–45)
PCO2 TEMP ADJ BLD: 71.6 MM HG (ref 35–45)
PEEP RESPIRATORY: 10 CM[H2O]
PEEP RESPIRATORY: 10 CM[H2O]
PEEP RESPIRATORY: 5 CM[H2O]
PEEP RESPIRATORY: 7 CM[H2O]
PEEP RESPIRATORY: 7 CM[H2O]
PEEP RESPIRATORY: 7.5 CM[H2O]
PEEP RESPIRATORY: 8 CM[H2O]
PH BLDA: 7.18 PH UNITS (ref 7.35–7.45)
PH BLDA: 7.29 PH UNITS (ref 7.35–7.45)
PH BLDA: 7.34 PH UNITS (ref 7.35–7.45)
PH BLDA: 7.35 PH UNITS (ref 7.35–7.45)
PH BLDA: 7.37 PH UNITS (ref 7.35–7.45)
PH BLDA: 7.4 PH UNITS (ref 7.35–7.45)
PH BLDA: 7.41 PH UNITS (ref 7.35–7.45)
PH BLDA: 7.42 PH UNITS (ref 7.35–7.45)
PH BLDA: 7.45 PH UNITS (ref 7.35–7.45)
PH BLDA: 7.46 PH UNITS (ref 7.35–7.45)
PH BLDA: 7.47 PH UNITS (ref 7.35–7.45)
PH BLDA: 7.47 PH UNITS (ref 7.35–7.45)
PH BLDA: 7.48 PH UNITS (ref 7.35–7.45)
PH BLDA: 7.49 PH UNITS (ref 7.35–7.45)
PH BLDA: 7.5 PH UNITS (ref 7.35–7.45)
PH BLDA: 7.5 PH UNITS (ref 7.35–7.45)
PH BLDA: 7.51 PH UNITS (ref 7.35–7.45)
PH BLDA: 7.52 PH UNITS (ref 7.35–7.45)
PH BLDA: 7.53 PH UNITS (ref 7.35–7.45)
PH BLDA: 7.54 PH UNITS (ref 7.35–7.45)
PH BLDA: 7.56 PH UNITS (ref 7.35–7.45)
PH BLDA: 7.56 PH UNITS (ref 7.35–7.45)
PH UR STRIP.AUTO: 5.5 [PH] (ref 5–8)
PH UR STRIP.AUTO: <=5 [PH] (ref 5–8)
PH UR STRIP.AUTO: <=5 [PH] (ref 5–8)
PH, TEMP CORRECTED: 7.18 PH UNITS (ref 7.35–7.45)
PH, TEMP CORRECTED: 7.29 PH UNITS (ref 7.35–7.45)
PH, TEMP CORRECTED: 7.34 PH UNITS (ref 7.35–7.45)
PH, TEMP CORRECTED: 7.35 PH UNITS (ref 7.35–7.45)
PH, TEMP CORRECTED: 7.37 PH UNITS (ref 7.35–7.45)
PH, TEMP CORRECTED: 7.4 PH UNITS (ref 7.35–7.45)
PH, TEMP CORRECTED: 7.41 PH UNITS (ref 7.35–7.45)
PH, TEMP CORRECTED: 7.42 PH UNITS (ref 7.35–7.45)
PH, TEMP CORRECTED: 7.45 PH UNITS (ref 7.35–7.45)
PH, TEMP CORRECTED: 7.46 PH UNITS (ref 7.35–7.45)
PH, TEMP CORRECTED: 7.47 PH UNITS (ref 7.35–7.45)
PH, TEMP CORRECTED: 7.47 PH UNITS (ref 7.35–7.45)
PH, TEMP CORRECTED: 7.48 PH UNITS (ref 7.35–7.45)
PH, TEMP CORRECTED: 7.49 PH UNITS (ref 7.35–7.45)
PH, TEMP CORRECTED: 7.5 PH UNITS (ref 7.35–7.45)
PH, TEMP CORRECTED: 7.5 PH UNITS (ref 7.35–7.45)
PH, TEMP CORRECTED: 7.51 PH UNITS (ref 7.35–7.45)
PH, TEMP CORRECTED: 7.52 PH UNITS (ref 7.35–7.45)
PH, TEMP CORRECTED: 7.53 PH UNITS (ref 7.35–7.45)
PH, TEMP CORRECTED: 7.54 PH UNITS (ref 7.35–7.45)
PH, TEMP CORRECTED: 7.56 PH UNITS (ref 7.35–7.45)
PH, TEMP CORRECTED: 7.56 PH UNITS (ref 7.35–7.45)
PHOSPHATE SERPL-MCNC: 2.8 MG/DL (ref 2.5–4.5)
PHOSPHATE SERPL-MCNC: 3 MG/DL (ref 2.5–4.5)
PHOSPHATE SERPL-MCNC: 3 MG/DL (ref 2.5–4.5)
PHOSPHATE SERPL-MCNC: 3.1 MG/DL (ref 2.5–4.5)
PHOSPHATE SERPL-MCNC: 3.3 MG/DL (ref 2.5–4.5)
PHOSPHATE SERPL-MCNC: 3.4 MG/DL (ref 2.5–4.5)
PHOSPHATE SERPL-MCNC: 3.6 MG/DL (ref 2.5–4.5)
PHOSPHATE SERPL-MCNC: 4.3 MG/DL (ref 2.5–4.5)
PLAT MORPH BLD: NORMAL
PLATELET # BLD AUTO: 163 10*3/MM3 (ref 140–450)
PLATELET # BLD AUTO: 174 10*3/MM3 (ref 140–450)
PLATELET # BLD AUTO: 188 10*3/MM3 (ref 140–450)
PLATELET # BLD AUTO: 192 10*3/MM3 (ref 140–450)
PLATELET # BLD AUTO: 203 10*3/MM3 (ref 140–450)
PLATELET # BLD AUTO: 204 10*3/MM3 (ref 140–450)
PLATELET # BLD AUTO: 209 10*3/MM3 (ref 140–450)
PLATELET # BLD AUTO: 219 10*3/MM3 (ref 140–450)
PLATELET # BLD AUTO: 219 10*3/MM3 (ref 140–450)
PLATELET # BLD AUTO: 220 10*3/MM3 (ref 140–450)
PLATELET # BLD AUTO: 224 10*3/MM3 (ref 140–450)
PLATELET # BLD AUTO: 227 10*3/MM3 (ref 140–450)
PLATELET # BLD AUTO: 229 10*3/MM3 (ref 140–450)
PLATELET # BLD AUTO: 234 10*3/MM3 (ref 140–450)
PLATELET # BLD AUTO: 245 10*3/MM3 (ref 140–450)
PLATELET # BLD AUTO: 246 10*3/MM3 (ref 140–450)
PLATELET # BLD AUTO: 253 10*3/MM3 (ref 140–450)
PLATELET # BLD AUTO: 281 10*3/MM3 (ref 140–450)
PLATELET # BLD AUTO: 286 10*3/MM3 (ref 140–450)
PLATELET # BLD AUTO: 298 10*3/MM3 (ref 140–450)
PMV BLD AUTO: 10.3 FL (ref 6–12)
PMV BLD AUTO: 10.5 FL (ref 6–12)
PMV BLD AUTO: 10.5 FL (ref 6–12)
PMV BLD AUTO: 10.6 FL (ref 6–12)
PMV BLD AUTO: 10.8 FL (ref 6–12)
PMV BLD AUTO: 10.8 FL (ref 6–12)
PMV BLD AUTO: 11 FL (ref 6–12)
PMV BLD AUTO: 11.1 FL (ref 6–12)
PMV BLD AUTO: 11.2 FL (ref 6–12)
PMV BLD AUTO: 11.2 FL (ref 6–12)
PMV BLD AUTO: 11.3 FL (ref 6–12)
PMV BLD AUTO: 11.4 FL (ref 6–12)
PMV BLD AUTO: 11.4 FL (ref 6–12)
PMV BLD AUTO: 11.6 FL (ref 6–12)
PMV BLD AUTO: 11.7 FL (ref 6–12)
PMV BLD AUTO: 11.7 FL (ref 6–12)
PMV BLD AUTO: 11.8 FL (ref 6–12)
PMV BLD AUTO: 11.9 FL (ref 6–12)
PO2 BLDA: 106 MM HG (ref 83–108)
PO2 BLDA: 113 MM HG (ref 83–108)
PO2 BLDA: 115 MM HG (ref 83–108)
PO2 BLDA: 167 MM HG (ref 83–108)
PO2 BLDA: 196 MM HG (ref 83–108)
PO2 BLDA: 223 MM HG (ref 83–108)
PO2 BLDA: 348 MM HG (ref 83–108)
PO2 BLDA: 50.3 MM HG (ref 83–108)
PO2 BLDA: 52.2 MM HG (ref 83–108)
PO2 BLDA: 52.9 MM HG (ref 83–108)
PO2 BLDA: 55.2 MM HG (ref 83–108)
PO2 BLDA: 59 MM HG (ref 83–108)
PO2 BLDA: 62 MM HG (ref 83–108)
PO2 BLDA: 62.4 MM HG (ref 83–108)
PO2 BLDA: 63.2 MM HG (ref 83–108)
PO2 BLDA: 64.7 MM HG (ref 83–108)
PO2 BLDA: 65.7 MM HG (ref 83–108)
PO2 BLDA: 68.1 MM HG (ref 83–108)
PO2 BLDA: 68.2 MM HG (ref 83–108)
PO2 BLDA: 74.1 MM HG (ref 83–108)
PO2 BLDA: 74.3 MM HG (ref 83–108)
PO2 BLDA: 77.4 MM HG (ref 83–108)
PO2 BLDA: 84.1 MM HG (ref 83–108)
PO2 BLDA: 85.7 MM HG (ref 83–108)
PO2 BLDA: 88.8 MM HG (ref 83–108)
PO2 BLDA: 95.4 MM HG (ref 83–108)
PO2 BLDA: 96.5 MM HG (ref 83–108)
PO2 TEMP ADJ BLD: 106 MM HG (ref 83–108)
PO2 TEMP ADJ BLD: 113 MM HG (ref 83–108)
PO2 TEMP ADJ BLD: 115 MM HG (ref 83–108)
PO2 TEMP ADJ BLD: 167 MM HG (ref 83–108)
PO2 TEMP ADJ BLD: 196 MM HG (ref 83–108)
PO2 TEMP ADJ BLD: 223 MM HG (ref 83–108)
PO2 TEMP ADJ BLD: 348 MM HG (ref 83–108)
PO2 TEMP ADJ BLD: 50.3 MM HG (ref 83–108)
PO2 TEMP ADJ BLD: 52.2 MM HG (ref 83–108)
PO2 TEMP ADJ BLD: 52.9 MM HG (ref 83–108)
PO2 TEMP ADJ BLD: 55.2 MM HG (ref 83–108)
PO2 TEMP ADJ BLD: 59 MM HG (ref 83–108)
PO2 TEMP ADJ BLD: 62 MM HG (ref 83–108)
PO2 TEMP ADJ BLD: 62.4 MM HG (ref 83–108)
PO2 TEMP ADJ BLD: 63.2 MM HG (ref 83–108)
PO2 TEMP ADJ BLD: 64.7 MM HG (ref 83–108)
PO2 TEMP ADJ BLD: 65.7 MM HG (ref 83–108)
PO2 TEMP ADJ BLD: 68.1 MM HG (ref 83–108)
PO2 TEMP ADJ BLD: 68.2 MM HG (ref 83–108)
PO2 TEMP ADJ BLD: 74.1 MM HG (ref 83–108)
PO2 TEMP ADJ BLD: 74.3 MM HG (ref 83–108)
PO2 TEMP ADJ BLD: 77.4 MM HG (ref 83–108)
PO2 TEMP ADJ BLD: 84.1 MM HG (ref 83–108)
PO2 TEMP ADJ BLD: 85.7 MM HG (ref 83–108)
PO2 TEMP ADJ BLD: 88.8 MM HG (ref 83–108)
PO2 TEMP ADJ BLD: 95.4 MM HG (ref 83–108)
PO2 TEMP ADJ BLD: 96.5 MM HG (ref 83–108)
POIKILOCYTOSIS BLD QL SMEAR: ABNORMAL
POLYCHROMASIA BLD QL SMEAR: ABNORMAL
POTASSIUM SERPL-SCNC: 3.3 MMOL/L (ref 3.5–5.2)
POTASSIUM SERPL-SCNC: 3.4 MMOL/L (ref 3.5–5.2)
POTASSIUM SERPL-SCNC: 3.4 MMOL/L (ref 3.5–5.2)
POTASSIUM SERPL-SCNC: 3.5 MMOL/L (ref 3.5–5.2)
POTASSIUM SERPL-SCNC: 3.6 MMOL/L (ref 3.5–5.2)
POTASSIUM SERPL-SCNC: 3.7 MMOL/L (ref 3.5–5.2)
POTASSIUM SERPL-SCNC: 3.8 MMOL/L (ref 3.5–5.2)
POTASSIUM SERPL-SCNC: 3.9 MMOL/L (ref 3.5–5.2)
POTASSIUM SERPL-SCNC: 4 MMOL/L (ref 3.5–5.2)
POTASSIUM SERPL-SCNC: 4.1 MMOL/L (ref 3.5–5.2)
POTASSIUM SERPL-SCNC: 4.2 MMOL/L (ref 3.5–5.2)
POTASSIUM SERPL-SCNC: 4.4 MMOL/L (ref 3.5–5.2)
POTASSIUM SERPL-SCNC: 4.5 MMOL/L (ref 3.5–5.2)
POTASSIUM SERPL-SCNC: 4.6 MMOL/L (ref 3.5–5.2)
POTASSIUM SERPL-SCNC: 5 MMOL/L (ref 3.5–5.2)
POTASSIUM SERPL-SCNC: 5.3 MMOL/L (ref 3.5–5.2)
POTASSIUM SERPL-SCNC: 5.3 MMOL/L (ref 3.5–5.2)
PROCALCITONIN SERPL-MCNC: 0.13 NG/ML (ref 0–0.25)
PROT CSF-MCNC: 90.9 MG/DL (ref 15–45)
PROT SERPL-MCNC: 5.1 G/DL (ref 6–8.5)
PROT SERPL-MCNC: 5.2 G/DL (ref 6–8.5)
PROT SERPL-MCNC: 5.4 G/DL (ref 6–8.5)
PROT SERPL-MCNC: 5.4 G/DL (ref 6–8.5)
PROT SERPL-MCNC: 5.8 G/DL (ref 6–8.5)
PROT SERPL-MCNC: 5.9 G/DL (ref 6–8.5)
PROT SERPL-MCNC: 6 G/DL (ref 6–8.5)
PROT SERPL-MCNC: 6 G/DL (ref 6–8.5)
PROT SERPL-MCNC: 6.1 G/DL (ref 6–8.5)
PROT SERPL-MCNC: 6.3 G/DL (ref 6–8.5)
PROT SERPL-MCNC: 6.8 G/DL (ref 6–8.5)
PROT SERPL-MCNC: 6.9 G/DL (ref 6–8.5)
PROT SERPL-MCNC: 7.4 G/DL (ref 6–8.5)
PROT UR QL STRIP: ABNORMAL
PROTHROMBIN TIME: 14.9 SECONDS (ref 11.8–14.8)
PROTHROMBIN TIME: 15.6 SECONDS (ref 11.8–14.8)
QT INTERVAL: 264 MS
QT INTERVAL: 324 MS
QT INTERVAL: 330 MS
QT INTERVAL: 338 MS
QT INTERVAL: 352 MS
QT INTERVAL: 420 MS
QTC INTERVAL: 376 MS
QTC INTERVAL: 392 MS
QTC INTERVAL: 401 MS
QTC INTERVAL: 401 MS
QTC INTERVAL: 426 MS
QTC INTERVAL: 516 MS
RBC # BLD AUTO: 2.97 10*6/MM3 (ref 4.14–5.8)
RBC # BLD AUTO: 3.01 10*6/MM3 (ref 4.14–5.8)
RBC # BLD AUTO: 3.03 10*6/MM3 (ref 4.14–5.8)
RBC # BLD AUTO: 3.08 10*6/MM3 (ref 4.14–5.8)
RBC # BLD AUTO: 3.08 10*6/MM3 (ref 4.14–5.8)
RBC # BLD AUTO: 3.12 10*6/MM3 (ref 4.14–5.8)
RBC # BLD AUTO: 3.18 10*6/MM3 (ref 4.14–5.8)
RBC # BLD AUTO: 3.19 10*6/MM3 (ref 4.14–5.8)
RBC # BLD AUTO: 3.36 10*6/MM3 (ref 4.14–5.8)
RBC # BLD AUTO: 3.38 10*6/MM3 (ref 4.14–5.8)
RBC # BLD AUTO: 3.39 10*6/MM3 (ref 4.14–5.8)
RBC # BLD AUTO: 3.42 10*6/MM3 (ref 4.14–5.8)
RBC # BLD AUTO: 3.47 10*6/MM3 (ref 4.14–5.8)
RBC # BLD AUTO: 3.55 10*6/MM3 (ref 4.14–5.8)
RBC # BLD AUTO: 3.63 10*6/MM3 (ref 4.14–5.8)
RBC # BLD AUTO: 3.7 10*6/MM3 (ref 4.14–5.8)
RBC # BLD AUTO: 3.71 10*6/MM3 (ref 4.14–5.8)
RBC # BLD AUTO: 3.82 10*6/MM3 (ref 4.14–5.8)
RBC # BLD AUTO: 3.95 10*6/MM3 (ref 4.14–5.8)
RBC # BLD AUTO: 4.4 10*6/MM3 (ref 4.14–5.8)
RBC # CSF MANUAL: 18 /MM3 (ref 0–0)
RBC # UR STRIP: ABNORMAL /HPF
REF LAB TEST METHOD: ABNORMAL
RH BLD: NEGATIVE
RHINOVIRUS RNA SPEC NAA+PROBE: NOT DETECTED
RSV RNA NPH QL NAA+NON-PROBE: NOT DETECTED
S PNEUM AG SPEC QL LA: NEGATIVE
SAO2 % BLDCOA: 100 % (ref 94–99)
SAO2 % BLDCOA: 100 % (ref 94–99)
SAO2 % BLDCOA: 77.3 % (ref 94–99)
SAO2 % BLDCOA: 83.4 % (ref 94–99)
SAO2 % BLDCOA: 88 % (ref 94–99)
SAO2 % BLDCOA: 89.4 % (ref 94–99)
SAO2 % BLDCOA: 90 % (ref 94–99)
SAO2 % BLDCOA: 91.8 % (ref 94–99)
SAO2 % BLDCOA: 92.5 % (ref 94–99)
SAO2 % BLDCOA: 92.6 % (ref 94–99)
SAO2 % BLDCOA: 92.7 % (ref 94–99)
SAO2 % BLDCOA: 92.9 % (ref 94–99)
SAO2 % BLDCOA: 94.3 % (ref 94–99)
SAO2 % BLDCOA: 94.6 % (ref 94–99)
SAO2 % BLDCOA: 95.8 % (ref 94–99)
SAO2 % BLDCOA: 96.2 % (ref 94–99)
SAO2 % BLDCOA: 96.3 % (ref 94–99)
SAO2 % BLDCOA: 97.1 % (ref 94–99)
SAO2 % BLDCOA: 97.1 % (ref 94–99)
SAO2 % BLDCOA: 97.5 % (ref 94–99)
SAO2 % BLDCOA: 97.5 % (ref 94–99)
SAO2 % BLDCOA: 98.1 % (ref 94–99)
SAO2 % BLDCOA: 98.8 % (ref 94–99)
SAO2 % BLDCOA: 99.1 % (ref 94–99)
SAO2 % BLDCOA: 99.1 % (ref 94–99)
SAO2 % BLDCOA: 99.8 % (ref 94–99)
SAO2 % BLDCOA: >100.1 % (ref 94–99)
SARS-COV-2 RNA NPH QL NAA+NON-PROBE: NOT DETECTED
SET MECH RESP RATE: 10
SET MECH RESP RATE: 10
SET MECH RESP RATE: 12
SET MECH RESP RATE: 14
SET MECH RESP RATE: 16
SET MECH RESP RATE: 16
SET MECH RESP RATE: 18
SET MECH RESP RATE: 20
SODIUM SERPL-SCNC: 132 MMOL/L (ref 136–145)
SODIUM SERPL-SCNC: 134 MMOL/L (ref 136–145)
SODIUM SERPL-SCNC: 134 MMOL/L (ref 136–145)
SODIUM SERPL-SCNC: 135 MMOL/L (ref 136–145)
SODIUM SERPL-SCNC: 136 MMOL/L (ref 136–145)
SODIUM SERPL-SCNC: 137 MMOL/L (ref 136–145)
SODIUM SERPL-SCNC: 138 MMOL/L (ref 136–145)
SODIUM SERPL-SCNC: 139 MMOL/L (ref 136–145)
SODIUM SERPL-SCNC: 140 MMOL/L (ref 136–145)
SODIUM SERPL-SCNC: 141 MMOL/L (ref 136–145)
SODIUM SERPL-SCNC: 142 MMOL/L (ref 136–145)
SP GR UR STRIP: 1.02 (ref 1–1.03)
SP GR UR STRIP: >1.03 (ref 1–1.03)
SP GR UR STRIP: >1.03 (ref 1–1.03)
SPECIMEN VOL CSF: 26 ML
SQUAMOUS #/AREA URNS HPF: ABNORMAL /HPF
STRESS TARGET HR: 130 BPM
STRESS TARGET HR: 130 BPM
T&S EXPIRATION DATE: NORMAL
TREPONEMA PALLIDUM IGG+IGM AB [PRESENCE] IN SERUM OR PLASMA BY IMMUNOASSAY: NON REACTIVE
TROPONIN T DELTA: -10 NG/L
TROPONIN T DELTA: 25 NG/L
TROPONIN T DELTA: 263 NG/L
TROPONIN T DELTA: 45 NG/L
TROPONIN T SERPL HS-MCNC: 305 NG/L
TROPONIN T SERPL HS-MCNC: 351 NG/L
TROPONIN T SERPL HS-MCNC: 409 NG/L
TROPONIN T SERPL HS-MCNC: 583 NG/L
TROPONIN T SERPL HS-MCNC: 766 NG/L
TUBE # CSF: 1
UROBILINOGEN UR QL STRIP: ABNORMAL
VANCOMYCIN TROUGH SERPL-MCNC: 10.3 MCG/ML (ref 5–20)
VANCOMYCIN TROUGH SERPL-MCNC: 12.6 MCG/ML (ref 5–20)
VANCOMYCIN TROUGH SERPL-MCNC: 13.4 MCG/ML (ref 5–20)
VANCOMYCIN TROUGH SERPL-MCNC: 22.2 MCG/ML (ref 5–20)
VARIANT LYMPHS NFR BLD MANUAL: 14 % (ref 19.6–45.3)
VARIANT LYMPHS NFR BLD MANUAL: 2 % (ref 0–5)
VENTILATOR MODE: ABNORMAL
VENTILATOR MODE: AC
VT ON VENT VENT: 500 ML
VT ON VENT VENT: 550 ML
WBC # UR STRIP: ABNORMAL /HPF
WBC MORPH BLD: NORMAL
WBC NRBC COR # BLD: 10 10*3/MM3 (ref 3.4–10.8)
WBC NRBC COR # BLD: 10.38 10*3/MM3 (ref 3.4–10.8)
WBC NRBC COR # BLD: 11 10*3/MM3 (ref 3.4–10.8)
WBC NRBC COR # BLD: 12.05 10*3/MM3 (ref 3.4–10.8)
WBC NRBC COR # BLD: 13.26 10*3/MM3 (ref 3.4–10.8)
WBC NRBC COR # BLD: 13.3 10*3/MM3 (ref 3.4–10.8)
WBC NRBC COR # BLD: 13.39 10*3/MM3 (ref 3.4–10.8)
WBC NRBC COR # BLD: 13.61 10*3/MM3 (ref 3.4–10.8)
WBC NRBC COR # BLD: 14.4 10*3/MM3 (ref 3.4–10.8)
WBC NRBC COR # BLD: 14.47 10*3/MM3 (ref 3.4–10.8)
WBC NRBC COR # BLD: 15.15 10*3/MM3 (ref 3.4–10.8)
WBC NRBC COR # BLD: 16.57 10*3/MM3 (ref 3.4–10.8)
WBC NRBC COR # BLD: 16.7 10*3/MM3 (ref 3.4–10.8)
WBC NRBC COR # BLD: 17.06 10*3/MM3 (ref 3.4–10.8)
WBC NRBC COR # BLD: 17.15 10*3/MM3 (ref 3.4–10.8)
WBC NRBC COR # BLD: 17.47 10*3/MM3 (ref 3.4–10.8)
WBC NRBC COR # BLD: 17.92 10*3/MM3 (ref 3.4–10.8)
WBC NRBC COR # BLD: 18.47 10*3/MM3 (ref 3.4–10.8)
WBC NRBC COR # BLD: 19.05 10*3/MM3 (ref 3.4–10.8)
WBC NRBC COR # BLD: 21.23 10*3/MM3 (ref 3.4–10.8)
YEAST URNS QL MICRO: ABNORMAL /HPF

## 2023-01-01 PROCEDURE — 94799 UNLISTED PULMONARY SVC/PX: CPT

## 2023-01-01 PROCEDURE — 36600 WITHDRAWAL OF ARTERIAL BLOOD: CPT

## 2023-01-01 PROCEDURE — 93010 ELECTROCARDIOGRAM REPORT: CPT | Performed by: INTERNAL MEDICINE

## 2023-01-01 PROCEDURE — 25010000002 VANCOMYCIN 1 G RECONSTITUTED SOLUTION 1 EACH VIAL: Performed by: NEUROLOGICAL SURGERY

## 2023-01-01 PROCEDURE — 80048 BASIC METABOLIC PNL TOTAL CA: CPT | Performed by: INTERNAL MEDICINE

## 2023-01-01 PROCEDURE — 25010000002 PROPOFOL 10 MG/ML EMULSION: Performed by: OTOLARYNGOLOGY

## 2023-01-01 PROCEDURE — 85027 COMPLETE CBC AUTOMATED: CPT | Performed by: INTERNAL MEDICINE

## 2023-01-01 PROCEDURE — 94664 DEMO&/EVAL PT USE INHALER: CPT

## 2023-01-01 PROCEDURE — B01B1ZZ FLUOROSCOPY OF SPINAL CORD USING LOW OSMOLAR CONTRAST: ICD-10-PCS | Performed by: RADIOLOGY

## 2023-01-01 PROCEDURE — 63710000001 INSULIN DETEMIR PER 5 UNITS: Performed by: INTERNAL MEDICINE

## 2023-01-01 PROCEDURE — 99024 POSTOP FOLLOW-UP VISIT: CPT | Performed by: NEUROLOGICAL SURGERY

## 2023-01-01 PROCEDURE — 93321 DOPPLER ECHO F-UP/LMTD STD: CPT

## 2023-01-01 PROCEDURE — 63710000001 INSULIN LISPRO (HUMAN) PER 5 UNITS: Performed by: NURSE PRACTITIONER

## 2023-01-01 PROCEDURE — 99024 POSTOP FOLLOW-UP VISIT: CPT | Performed by: NURSE PRACTITIONER

## 2023-01-01 PROCEDURE — 25010000002 VANCOMYCIN 10 G RECONSTITUTED SOLUTION: Performed by: INTERNAL MEDICINE

## 2023-01-01 PROCEDURE — 71046 X-RAY EXAM CHEST 2 VIEWS: CPT

## 2023-01-01 PROCEDURE — 82948 REAGENT STRIP/BLOOD GLUCOSE: CPT

## 2023-01-01 PROCEDURE — 82962 GLUCOSE BLOOD TEST: CPT

## 2023-01-01 PROCEDURE — 25010000002 VANCOMYCIN 1 G RECONSTITUTED SOLUTION: Performed by: NEUROLOGICAL SURGERY

## 2023-01-01 PROCEDURE — 25010000002 MORPHINE PER 10 MG: Performed by: FAMILY MEDICINE

## 2023-01-01 PROCEDURE — 84100 ASSAY OF PHOSPHORUS: CPT | Performed by: NURSE PRACTITIONER

## 2023-01-01 PROCEDURE — 94003 VENT MGMT INPAT SUBQ DAY: CPT

## 2023-01-01 PROCEDURE — 93005 ELECTROCARDIOGRAM TRACING: CPT | Performed by: NEUROLOGICAL SURGERY

## 2023-01-01 PROCEDURE — 94761 N-INVAS EAR/PLS OXIMETRY MLT: CPT

## 2023-01-01 PROCEDURE — 86780 TREPONEMA PALLIDUM: CPT | Performed by: FAMILY MEDICINE

## 2023-01-01 PROCEDURE — 99223 1ST HOSP IP/OBS HIGH 75: CPT | Performed by: NURSE PRACTITIONER

## 2023-01-01 PROCEDURE — 93306 TTE W/DOPPLER COMPLETE: CPT

## 2023-01-01 PROCEDURE — 84484 ASSAY OF TROPONIN QUANT: CPT | Performed by: INTERNAL MEDICINE

## 2023-01-01 PROCEDURE — 99222 1ST HOSP IP/OBS MODERATE 55: CPT | Performed by: INTERNAL MEDICINE

## 2023-01-01 PROCEDURE — 83880 ASSAY OF NATRIURETIC PEPTIDE: CPT | Performed by: INTERNAL MEDICINE

## 2023-01-01 PROCEDURE — 25510000001 IOPAMIDOL PER 1 ML: Performed by: NEUROLOGICAL SURGERY

## 2023-01-01 PROCEDURE — 63710000001 INSULIN LISPRO (HUMAN) PER 5 UNITS: Performed by: INTERNAL MEDICINE

## 2023-01-01 PROCEDURE — 25010000002 MEROPENEM PER 100 MG: Performed by: INTERNAL MEDICINE

## 2023-01-01 PROCEDURE — 25010000002 ENOXAPARIN PER 10 MG: Performed by: NEUROLOGICAL SURGERY

## 2023-01-01 PROCEDURE — A9577 INJ MULTIHANCE: HCPCS | Performed by: NEUROLOGICAL SURGERY

## 2023-01-01 PROCEDURE — 97168 OT RE-EVAL EST PLAN CARE: CPT | Performed by: OCCUPATIONAL THERAPIST

## 2023-01-01 PROCEDURE — 71045 X-RAY EXAM CHEST 1 VIEW: CPT

## 2023-01-01 PROCEDURE — 85025 COMPLETE CBC W/AUTO DIFF WBC: CPT | Performed by: NURSE PRACTITIONER

## 2023-01-01 PROCEDURE — 25010000002 METHYLPREDNISOLONE PER 125 MG: Performed by: NURSE PRACTITIONER

## 2023-01-01 PROCEDURE — 85025 COMPLETE CBC W/AUTO DIFF WBC: CPT | Performed by: FAMILY MEDICINE

## 2023-01-01 PROCEDURE — 25010000002 METHYLNALTREXONE 12 MG/0.6ML SOLUTION: Performed by: NURSE PRACTITIONER

## 2023-01-01 PROCEDURE — 80053 COMPREHEN METABOLIC PANEL: CPT | Performed by: NURSE PRACTITIONER

## 2023-01-01 PROCEDURE — 02HV33Z INSERTION OF INFUSION DEVICE INTO SUPERIOR VENA CAVA, PERCUTANEOUS APPROACH: ICD-10-PCS | Performed by: INTERNAL MEDICINE

## 2023-01-01 PROCEDURE — 25010000002 ONDANSETRON PER 1 MG: Performed by: NURSE PRACTITIONER

## 2023-01-01 PROCEDURE — 87040 BLOOD CULTURE FOR BACTERIA: CPT | Performed by: NURSE PRACTITIONER

## 2023-01-01 PROCEDURE — 97110 THERAPEUTIC EXERCISES: CPT | Performed by: OCCUPATIONAL THERAPIST

## 2023-01-01 PROCEDURE — 93306 TTE W/DOPPLER COMPLETE: CPT | Performed by: INTERNAL MEDICINE

## 2023-01-01 PROCEDURE — 25010000002 METHYLNALTREXONE 12 MG/0.6ML SOLUTION: Performed by: OTOLARYNGOLOGY

## 2023-01-01 PROCEDURE — 25010000002 MORPHINE PER 10 MG: Performed by: NEUROLOGICAL SURGERY

## 2023-01-01 PROCEDURE — 25010000002 ENOXAPARIN PER 10 MG: Performed by: NURSE PRACTITIONER

## 2023-01-01 PROCEDURE — 99232 SBSQ HOSP IP/OBS MODERATE 35: CPT | Performed by: OTOLARYNGOLOGY

## 2023-01-01 PROCEDURE — 97162 PT EVAL MOD COMPLEX 30 MIN: CPT | Performed by: PHYSICAL THERAPIST

## 2023-01-01 PROCEDURE — 82803 BLOOD GASES ANY COMBINATION: CPT

## 2023-01-01 PROCEDURE — 87040 BLOOD CULTURE FOR BACTERIA: CPT | Performed by: INTERNAL MEDICINE

## 2023-01-01 PROCEDURE — 99233 SBSQ HOSP IP/OBS HIGH 50: CPT | Performed by: INTERNAL MEDICINE

## 2023-01-01 PROCEDURE — 25010000002 HYDROMORPHONE PER 4 MG: Performed by: ANESTHESIOLOGY

## 2023-01-01 PROCEDURE — 25010000002 FUROSEMIDE PER 20 MG: Performed by: INTERNAL MEDICINE

## 2023-01-01 PROCEDURE — 94760 N-INVAS EAR/PLS OXIMETRY 1: CPT

## 2023-01-01 PROCEDURE — 82945 GLUCOSE OTHER FLUID: CPT | Performed by: NURSE PRACTITIONER

## 2023-01-01 PROCEDURE — 25010000002 FUROSEMIDE PER 20 MG: Performed by: FAMILY MEDICINE

## 2023-01-01 PROCEDURE — 80053 COMPREHEN METABOLIC PANEL: CPT | Performed by: OTOLARYNGOLOGY

## 2023-01-01 PROCEDURE — 94660 CPAP INITIATION&MGMT: CPT

## 2023-01-01 PROCEDURE — 25010000002 MEROPENEM PER 100 MG: Performed by: NURSE PRACTITIONER

## 2023-01-01 PROCEDURE — 009U3ZX DRAINAGE OF SPINAL CANAL, PERCUTANEOUS APPROACH, DIAGNOSTIC: ICD-10-PCS | Performed by: RADIOLOGY

## 2023-01-01 PROCEDURE — 25010000002 METHYLPREDNISOLONE PER 125 MG: Performed by: INTERNAL MEDICINE

## 2023-01-01 PROCEDURE — 97110 THERAPEUTIC EXERCISES: CPT

## 2023-01-01 PROCEDURE — 25010000002 HEPARIN (PORCINE) PER 1000 UNITS: Performed by: NEUROLOGICAL SURGERY

## 2023-01-01 PROCEDURE — 25010000002 VANCOMYCIN 10 G RECONSTITUTED SOLUTION: Performed by: FAMILY MEDICINE

## 2023-01-01 PROCEDURE — 0B21XFZ CHANGE TRACHEOSTOMY DEVICE IN TRACHEA, EXTERNAL APPROACH: ICD-10-PCS | Performed by: INTERNAL MEDICINE

## 2023-01-01 PROCEDURE — 87070 CULTURE OTHR SPECIMN AEROBIC: CPT | Performed by: INTERNAL MEDICINE

## 2023-01-01 PROCEDURE — 85730 THROMBOPLASTIN TIME PARTIAL: CPT | Performed by: NURSE PRACTITIONER

## 2023-01-01 PROCEDURE — 25010000002 AMIODARONE IN DEXTROSE 5% 360-4.14 MG/200ML-% SOLUTION: Performed by: NEUROLOGICAL SURGERY

## 2023-01-01 PROCEDURE — 87206 SMEAR FLUORESCENT/ACID STAI: CPT | Performed by: NURSE PRACTITIONER

## 2023-01-01 PROCEDURE — 80053 COMPREHEN METABOLIC PANEL: CPT | Performed by: INTERNAL MEDICINE

## 2023-01-01 PROCEDURE — 63710000001 INSULIN DETEMIR PER 5 UNITS: Performed by: FAMILY MEDICINE

## 2023-01-01 PROCEDURE — 93321 DOPPLER ECHO F-UP/LMTD STD: CPT | Performed by: INTERNAL MEDICINE

## 2023-01-01 PROCEDURE — 31500 INSERT EMERGENCY AIRWAY: CPT

## 2023-01-01 PROCEDURE — 25010000002 EPINEPHRINE 1 MG/10ML SOLUTION PREFILLED SYRINGE

## 2023-01-01 PROCEDURE — 63710000001 INSULIN ISOPHANE & REGULAR PER 5 UNITS: Performed by: INTERNAL MEDICINE

## 2023-01-01 PROCEDURE — 25010000002 FUROSEMIDE PER 20 MG: Performed by: OTOLARYNGOLOGY

## 2023-01-01 PROCEDURE — 83930 ASSAY OF BLOOD OSMOLALITY: CPT | Performed by: NURSE PRACTITIONER

## 2023-01-01 PROCEDURE — 0 INSULIN REGULAR HUMAN PER 5 UNITS: Performed by: FAMILY MEDICINE

## 2023-01-01 PROCEDURE — 63710000001 INSULIN LISPRO (HUMAN) PER 5 UNITS: Performed by: OTOLARYNGOLOGY

## 2023-01-01 PROCEDURE — 84484 ASSAY OF TROPONIN QUANT: CPT | Performed by: FAMILY MEDICINE

## 2023-01-01 PROCEDURE — 25010000002 PIPERACILLIN SOD-TAZOBACTAM PER 1 G: Performed by: FAMILY MEDICINE

## 2023-01-01 PROCEDURE — 70450 CT HEAD/BRAIN W/O DYE: CPT

## 2023-01-01 PROCEDURE — S0260 H&P FOR SURGERY: HCPCS | Performed by: NURSE PRACTITIONER

## 2023-01-01 PROCEDURE — 87070 CULTURE OTHR SPECIMN AEROBIC: CPT | Performed by: NEUROLOGICAL SURGERY

## 2023-01-01 PROCEDURE — 80053 COMPREHEN METABOLIC PANEL: CPT | Performed by: FAMILY MEDICINE

## 2023-01-01 PROCEDURE — 83735 ASSAY OF MAGNESIUM: CPT | Performed by: NURSE PRACTITIONER

## 2023-01-01 PROCEDURE — 25010000002 MAGNESIUM SULFATE PER 500 MG OF MAGNESIUM: Performed by: INTERNAL MEDICINE

## 2023-01-01 PROCEDURE — 25010000002 VANCOMYCIN 1 G RECONSTITUTED SOLUTION 1 EACH VIAL: Performed by: FAMILY MEDICINE

## 2023-01-01 PROCEDURE — 87491 CHLMYD TRACH DNA AMP PROBE: CPT | Performed by: FAMILY MEDICINE

## 2023-01-01 PROCEDURE — 97530 THERAPEUTIC ACTIVITIES: CPT

## 2023-01-01 PROCEDURE — 25010000002 VANCOMYCIN 1 G RECONSTITUTED SOLUTION 1 EACH VIAL: Performed by: INTERNAL MEDICINE

## 2023-01-01 PROCEDURE — 0 POTASSIUM CHLORIDE PER 2 MEQ: Performed by: INTERNAL MEDICINE

## 2023-01-01 PROCEDURE — 71250 CT THORAX DX C-: CPT

## 2023-01-01 PROCEDURE — 86140 C-REACTIVE PROTEIN: CPT | Performed by: NURSE PRACTITIONER

## 2023-01-01 PROCEDURE — 25010000002 PROPOFOL 10 MG/ML EMULSION: Performed by: FAMILY MEDICINE

## 2023-01-01 PROCEDURE — 5A1955Z RESPIRATORY VENTILATION, GREATER THAN 96 CONSECUTIVE HOURS: ICD-10-PCS | Performed by: FAMILY MEDICINE

## 2023-01-01 PROCEDURE — 80202 ASSAY OF VANCOMYCIN: CPT | Performed by: NEUROLOGICAL SURGERY

## 2023-01-01 PROCEDURE — 85379 FIBRIN DEGRADATION QUANT: CPT | Performed by: FAMILY MEDICINE

## 2023-01-01 PROCEDURE — 99232 SBSQ HOSP IP/OBS MODERATE 35: CPT | Performed by: INTERNAL MEDICINE

## 2023-01-01 PROCEDURE — 0 GADOBENATE DIMEGLUMINE 529 MG/ML SOLUTION: Performed by: NEUROLOGICAL SURGERY

## 2023-01-01 PROCEDURE — 63710000001 INSULIN DETEMIR PER 5 UNITS: Performed by: NURSE PRACTITIONER

## 2023-01-01 PROCEDURE — 99231 SBSQ HOSP IP/OBS SF/LOW 25: CPT | Performed by: STUDENT IN AN ORGANIZED HEALTH CARE EDUCATION/TRAINING PROGRAM

## 2023-01-01 PROCEDURE — 25010000002 LORAZEPAM PER 2 MG: Performed by: INTERNAL MEDICINE

## 2023-01-01 PROCEDURE — 94002 VENT MGMT INPAT INIT DAY: CPT

## 2023-01-01 PROCEDURE — 5A2204Z RESTORATION OF CARDIAC RHYTHM, SINGLE: ICD-10-PCS | Performed by: INTERNAL MEDICINE

## 2023-01-01 PROCEDURE — 97116 GAIT TRAINING THERAPY: CPT

## 2023-01-01 PROCEDURE — 74018 RADEX ABDOMEN 1 VIEW: CPT

## 2023-01-01 PROCEDURE — 84100 ASSAY OF PHOSPHORUS: CPT | Performed by: INTERNAL MEDICINE

## 2023-01-01 PROCEDURE — 87449 NOS EACH ORGANISM AG IA: CPT | Performed by: INTERNAL MEDICINE

## 2023-01-01 PROCEDURE — 99223 1ST HOSP IP/OBS HIGH 75: CPT | Performed by: STUDENT IN AN ORGANIZED HEALTH CARE EDUCATION/TRAINING PROGRAM

## 2023-01-01 PROCEDURE — 80048 BASIC METABOLIC PNL TOTAL CA: CPT | Performed by: NURSE PRACTITIONER

## 2023-01-01 PROCEDURE — 85025 COMPLETE CBC W/AUTO DIFF WBC: CPT | Performed by: NEUROLOGICAL SURGERY

## 2023-01-01 PROCEDURE — 63710000001 INSULIN REGULAR HUMAN PER 5 UNITS: Performed by: ANESTHESIOLOGY

## 2023-01-01 PROCEDURE — 25010000002 DIGOXIN PER 500 MCG: Performed by: INTERNAL MEDICINE

## 2023-01-01 PROCEDURE — 63710000001 INSULIN LISPRO (HUMAN) PER 5 UNITS: Performed by: FAMILY MEDICINE

## 2023-01-01 PROCEDURE — 0 INSULIN REGULAR HUMAN PER 5 UNITS: Performed by: NURSE PRACTITIONER

## 2023-01-01 PROCEDURE — 83735 ASSAY OF MAGNESIUM: CPT | Performed by: INTERNAL MEDICINE

## 2023-01-01 PROCEDURE — 85025 COMPLETE CBC W/AUTO DIFF WBC: CPT | Performed by: INTERNAL MEDICINE

## 2023-01-01 PROCEDURE — 87205 SMEAR GRAM STAIN: CPT | Performed by: NEUROLOGICAL SURGERY

## 2023-01-01 PROCEDURE — 63710000001 INSULIN DETEMIR PER 5 UNITS: Performed by: OTOLARYNGOLOGY

## 2023-01-01 PROCEDURE — 85652 RBC SED RATE AUTOMATED: CPT | Performed by: NURSE PRACTITIONER

## 2023-01-01 PROCEDURE — 87591 N.GONORRHOEAE DNA AMP PROB: CPT | Performed by: FAMILY MEDICINE

## 2023-01-01 PROCEDURE — 0NP00JZ REMOVAL OF SYNTHETIC SUBSTITUTE FROM SKULL, OPEN APPROACH: ICD-10-PCS | Performed by: NEUROLOGICAL SURGERY

## 2023-01-01 PROCEDURE — 25010000002 MORPHINE PER 10 MG: Performed by: NURSE PRACTITIONER

## 2023-01-01 PROCEDURE — 85652 RBC SED RATE AUTOMATED: CPT | Performed by: NEUROLOGICAL SURGERY

## 2023-01-01 PROCEDURE — 99291 CRITICAL CARE FIRST HOUR: CPT | Performed by: INTERNAL MEDICINE

## 2023-01-01 PROCEDURE — 25010000002 LINEZOLID 600 MG/300ML SOLUTION: Performed by: INTERNAL MEDICINE

## 2023-01-01 PROCEDURE — 97530 THERAPEUTIC ACTIVITIES: CPT | Performed by: OCCUPATIONAL THERAPIST

## 2023-01-01 PROCEDURE — 25010000002 ZIPRASIDONE MESYLATE PER 10 MG: Performed by: FAMILY MEDICINE

## 2023-01-01 PROCEDURE — 93325 DOPPLER ECHO COLOR FLOW MAPG: CPT

## 2023-01-01 PROCEDURE — 25010000002 METHYLPREDNISOLONE PER 40 MG: Performed by: NURSE PRACTITIONER

## 2023-01-01 PROCEDURE — 71275 CT ANGIOGRAPHY CHEST: CPT

## 2023-01-01 PROCEDURE — 81001 URINALYSIS AUTO W/SCOPE: CPT | Performed by: NURSE PRACTITIONER

## 2023-01-01 PROCEDURE — 25010000002 HEPARIN (PORCINE) PER 1000 UNITS: Performed by: NURSE PRACTITIONER

## 2023-01-01 PROCEDURE — 86900 BLOOD TYPING SEROLOGIC ABO: CPT | Performed by: NURSE PRACTITIONER

## 2023-01-01 PROCEDURE — 80202 ASSAY OF VANCOMYCIN: CPT | Performed by: INTERNAL MEDICINE

## 2023-01-01 PROCEDURE — 87070 CULTURE OTHR SPECIMN AEROBIC: CPT | Performed by: FAMILY MEDICINE

## 2023-01-01 PROCEDURE — 86901 BLOOD TYPING SEROLOGIC RH(D): CPT | Performed by: NURSE PRACTITIONER

## 2023-01-01 PROCEDURE — 97164 PT RE-EVAL EST PLAN CARE: CPT | Performed by: PHYSICAL THERAPIST

## 2023-01-01 PROCEDURE — 83605 ASSAY OF LACTIC ACID: CPT | Performed by: INTERNAL MEDICINE

## 2023-01-01 PROCEDURE — 81001 URINALYSIS AUTO W/SCOPE: CPT | Performed by: INTERNAL MEDICINE

## 2023-01-01 PROCEDURE — 99292 CRITICAL CARE ADDL 30 MIN: CPT | Performed by: INTERNAL MEDICINE

## 2023-01-01 PROCEDURE — 5A09357 ASSISTANCE WITH RESPIRATORY VENTILATION, LESS THAN 24 CONSECUTIVE HOURS, CONTINUOUS POSITIVE AIRWAY PRESSURE: ICD-10-PCS | Performed by: FAMILY MEDICINE

## 2023-01-01 PROCEDURE — 92950 HEART/LUNG RESUSCITATION CPR: CPT

## 2023-01-01 PROCEDURE — 85610 PROTHROMBIN TIME: CPT | Performed by: NURSE PRACTITIONER

## 2023-01-01 PROCEDURE — 87186 SC STD MICRODIL/AGAR DIL: CPT | Performed by: INTERNAL MEDICINE

## 2023-01-01 PROCEDURE — 25010000002 VANCOMYCIN 1 G RECONSTITUTED SOLUTION: Performed by: INTERNAL MEDICINE

## 2023-01-01 PROCEDURE — 25010000002 AMIODARONE PER 30 MG: Performed by: INTERNAL MEDICINE

## 2023-01-01 PROCEDURE — 86140 C-REACTIVE PROTEIN: CPT | Performed by: INTERNAL MEDICINE

## 2023-01-01 PROCEDURE — 86850 RBC ANTIBODY SCREEN: CPT | Performed by: NURSE PRACTITIONER

## 2023-01-01 PROCEDURE — 25010000002 HYDROMORPHONE PER 4 MG: Performed by: INTERNAL MEDICINE

## 2023-01-01 PROCEDURE — 25010000002 PROPOFOL 10 MG/ML EMULSION: Performed by: INTERNAL MEDICINE

## 2023-01-01 PROCEDURE — 25010000002 ENOXAPARIN PER 10 MG: Performed by: FAMILY MEDICINE

## 2023-01-01 PROCEDURE — 93970 EXTREMITY STUDY: CPT

## 2023-01-01 PROCEDURE — 31500 INSERT EMERGENCY AIRWAY: CPT | Performed by: FAMILY MEDICINE

## 2023-01-01 PROCEDURE — 25010000002 LORAZEPAM PER 2 MG

## 2023-01-01 PROCEDURE — 86140 C-REACTIVE PROTEIN: CPT | Performed by: OTOLARYNGOLOGY

## 2023-01-01 PROCEDURE — 87015 SPECIMEN INFECT AGNT CONCNTJ: CPT | Performed by: NURSE PRACTITIONER

## 2023-01-01 PROCEDURE — 25510000001 PERFLUTREN 6.52 MG/ML SUSPENSION: Performed by: NEUROLOGICAL SURGERY

## 2023-01-01 PROCEDURE — 00C30ZZ EXTIRPATION OF MATTER FROM INTRACRANIAL EPIDURAL SPACE, OPEN APPROACH: ICD-10-PCS | Performed by: NEUROLOGICAL SURGERY

## 2023-01-01 PROCEDURE — 84157 ASSAY OF PROTEIN OTHER: CPT | Performed by: NURSE PRACTITIONER

## 2023-01-01 PROCEDURE — 87116 MYCOBACTERIA CULTURE: CPT | Performed by: NURSE PRACTITIONER

## 2023-01-01 PROCEDURE — 87641 MR-STAPH DNA AMP PROBE: CPT | Performed by: INTERNAL MEDICINE

## 2023-01-01 PROCEDURE — 25010000002 PIPERACILLIN SOD-TAZOBACTAM PER 1 G: Performed by: INTERNAL MEDICINE

## 2023-01-01 PROCEDURE — 93308 TTE F-UP OR LMTD: CPT

## 2023-01-01 PROCEDURE — 70553 MRI BRAIN STEM W/O & W/DYE: CPT

## 2023-01-01 PROCEDURE — 80299 QUANTITATIVE ASSAY DRUG: CPT | Performed by: FAMILY MEDICINE

## 2023-01-01 PROCEDURE — 97166 OT EVAL MOD COMPLEX 45 MIN: CPT

## 2023-01-01 PROCEDURE — 87147 CULTURE TYPE IMMUNOLOGIC: CPT | Performed by: INTERNAL MEDICINE

## 2023-01-01 PROCEDURE — 99232 SBSQ HOSP IP/OBS MODERATE 35: CPT | Performed by: NURSE PRACTITIONER

## 2023-01-01 PROCEDURE — 86037 ANCA TITER EACH ANTIBODY: CPT | Performed by: INTERNAL MEDICINE

## 2023-01-01 PROCEDURE — 83880 ASSAY OF NATRIURETIC PEPTIDE: CPT | Performed by: NURSE PRACTITIONER

## 2023-01-01 PROCEDURE — 84145 PROCALCITONIN (PCT): CPT | Performed by: INTERNAL MEDICINE

## 2023-01-01 PROCEDURE — 87086 URINE CULTURE/COLONY COUNT: CPT | Performed by: INTERNAL MEDICINE

## 2023-01-01 PROCEDURE — 86901 BLOOD TYPING SEROLOGIC RH(D): CPT | Performed by: NEUROLOGICAL SURGERY

## 2023-01-01 PROCEDURE — 93005 ELECTROCARDIOGRAM TRACING: CPT | Performed by: INTERNAL MEDICINE

## 2023-01-01 PROCEDURE — 31622 DX BRONCHOSCOPE/WASH: CPT | Performed by: OTOLARYNGOLOGY

## 2023-01-01 PROCEDURE — C1751 CATH, INF, PER/CENT/MIDLINE: HCPCS

## 2023-01-01 PROCEDURE — 83880 ASSAY OF NATRIURETIC PEPTIDE: CPT | Performed by: FAMILY MEDICINE

## 2023-01-01 PROCEDURE — 86140 C-REACTIVE PROTEIN: CPT | Performed by: NEUROLOGICAL SURGERY

## 2023-01-01 PROCEDURE — 86038 ANTINUCLEAR ANTIBODIES: CPT | Performed by: INTERNAL MEDICINE

## 2023-01-01 PROCEDURE — 87205 SMEAR GRAM STAIN: CPT | Performed by: NURSE PRACTITIONER

## 2023-01-01 PROCEDURE — 0 POTASSIUM CHLORIDE PER 2 MEQ: Performed by: NURSE PRACTITIONER

## 2023-01-01 PROCEDURE — 84132 ASSAY OF SERUM POTASSIUM: CPT | Performed by: INTERNAL MEDICINE

## 2023-01-01 PROCEDURE — 99232 SBSQ HOSP IP/OBS MODERATE 35: CPT

## 2023-01-01 PROCEDURE — 95822 EEG COMA OR SLEEP ONLY: CPT

## 2023-01-01 PROCEDURE — 10140 I&D HMTMA SEROMA/FLUID COLLJ: CPT | Performed by: NEUROLOGICAL SURGERY

## 2023-01-01 PROCEDURE — 93970 EXTREMITY STUDY: CPT | Performed by: SURGERY

## 2023-01-01 PROCEDURE — 87186 SC STD MICRODIL/AGAR DIL: CPT | Performed by: NEUROLOGICAL SURGERY

## 2023-01-01 PROCEDURE — 93325 DOPPLER ECHO COLOR FLOW MAPG: CPT | Performed by: INTERNAL MEDICINE

## 2023-01-01 PROCEDURE — 25010000002 MORPHINE PER 10 MG

## 2023-01-01 PROCEDURE — 43246 EGD PLACE GASTROSTOMY TUBE: CPT | Performed by: STUDENT IN AN ORGANIZED HEALTH CARE EDUCATION/TRAINING PROGRAM

## 2023-01-01 PROCEDURE — 86850 RBC ANTIBODY SCREEN: CPT | Performed by: NEUROLOGICAL SURGERY

## 2023-01-01 PROCEDURE — 87205 SMEAR GRAM STAIN: CPT | Performed by: FAMILY MEDICINE

## 2023-01-01 PROCEDURE — 0BH17EZ INSERTION OF ENDOTRACHEAL AIRWAY INTO TRACHEA, VIA NATURAL OR ARTIFICIAL OPENING: ICD-10-PCS | Performed by: FAMILY MEDICINE

## 2023-01-01 PROCEDURE — 93308 TTE F-UP OR LMTD: CPT | Performed by: INTERNAL MEDICINE

## 2023-01-01 PROCEDURE — 97535 SELF CARE MNGMENT TRAINING: CPT | Performed by: OCCUPATIONAL THERAPIST

## 2023-01-01 PROCEDURE — 70551 MRI BRAIN STEM W/O DYE: CPT

## 2023-01-01 PROCEDURE — 83036 HEMOGLOBIN GLYCOSYLATED A1C: CPT | Performed by: INTERNAL MEDICINE

## 2023-01-01 PROCEDURE — 83036 HEMOGLOBIN GLYCOSYLATED A1C: CPT | Performed by: NURSE PRACTITIONER

## 2023-01-01 PROCEDURE — 80053 COMPREHEN METABOLIC PANEL: CPT | Performed by: NEUROLOGICAL SURGERY

## 2023-01-01 PROCEDURE — 31600 PLANNED TRACHEOSTOMY: CPT | Performed by: OTOLARYNGOLOGY

## 2023-01-01 PROCEDURE — 0BJ08ZZ INSPECTION OF TRACHEOBRONCHIAL TREE, VIA NATURAL OR ARTIFICIAL OPENING ENDOSCOPIC: ICD-10-PCS | Performed by: OTOLARYNGOLOGY

## 2023-01-01 PROCEDURE — 85007 BL SMEAR W/DIFF WBC COUNT: CPT | Performed by: INTERNAL MEDICINE

## 2023-01-01 PROCEDURE — 25010000002 CEFTRIAXONE PER 250 MG: Performed by: NURSE PRACTITIONER

## 2023-01-01 PROCEDURE — 5A12012 PERFORMANCE OF CARDIAC OUTPUT, SINGLE, MANUAL: ICD-10-PCS | Performed by: FAMILY MEDICINE

## 2023-01-01 PROCEDURE — 94640 AIRWAY INHALATION TREATMENT: CPT

## 2023-01-01 PROCEDURE — 86900 BLOOD TYPING SEROLOGIC ABO: CPT | Performed by: NEUROLOGICAL SURGERY

## 2023-01-01 PROCEDURE — 84484 ASSAY OF TROPONIN QUANT: CPT | Performed by: NURSE PRACTITIONER

## 2023-01-01 PROCEDURE — 87205 SMEAR GRAM STAIN: CPT | Performed by: INTERNAL MEDICINE

## 2023-01-01 PROCEDURE — 02HV33Z INSERTION OF INFUSION DEVICE INTO SUPERIOR VENA CAVA, PERCUTANEOUS APPROACH: ICD-10-PCS | Performed by: FAMILY MEDICINE

## 2023-01-01 PROCEDURE — 74176 CT ABD & PELVIS W/O CONTRAST: CPT

## 2023-01-01 PROCEDURE — 97164 PT RE-EVAL EST PLAN CARE: CPT

## 2023-01-01 PROCEDURE — 25010000002 EPINEPHRINE 1 MG/10ML SOLUTION PREFILLED SYRINGE: Performed by: INTERNAL MEDICINE

## 2023-01-01 PROCEDURE — 89050 BODY FLUID CELL COUNT: CPT | Performed by: NURSE PRACTITIONER

## 2023-01-01 PROCEDURE — 99222 1ST HOSP IP/OBS MODERATE 55: CPT | Performed by: NURSE PRACTITIONER

## 2023-01-01 PROCEDURE — 87086 URINE CULTURE/COLONY COUNT: CPT | Performed by: NURSE PRACTITIONER

## 2023-01-01 PROCEDURE — 85652 RBC SED RATE AUTOMATED: CPT | Performed by: OTOLARYNGOLOGY

## 2023-01-01 PROCEDURE — 25010000002 EPINEPHRINE 1 MG/10ML SOLUTION PREFILLED SYRINGE: Performed by: FAMILY MEDICINE

## 2023-01-01 PROCEDURE — 0202U NFCT DS 22 TRGT SARS-COV-2: CPT | Performed by: INTERNAL MEDICINE

## 2023-01-01 PROCEDURE — 87070 CULTURE OTHR SPECIMN AEROBIC: CPT | Performed by: NURSE PRACTITIONER

## 2023-01-01 PROCEDURE — 0B110F4 BYPASS TRACHEA TO CUTANEOUS WITH TRACHEOSTOMY DEVICE, OPEN APPROACH: ICD-10-PCS | Performed by: OTOLARYNGOLOGY

## 2023-01-01 PROCEDURE — 25510000001 PERFLUTREN 6.52 MG/ML SUSPENSION: Performed by: INTERNAL MEDICINE

## 2023-01-01 PROCEDURE — 0DH63UZ INSERTION OF FEEDING DEVICE INTO STOMACH, PERCUTANEOUS APPROACH: ICD-10-PCS | Performed by: STUDENT IN AN ORGANIZED HEALTH CARE EDUCATION/TRAINING PROGRAM

## 2023-01-01 PROCEDURE — 74022 RADEX COMPL AQT ABD SERIES: CPT

## 2023-01-01 PROCEDURE — C1769 GUIDE WIRE: HCPCS | Performed by: OTOLARYNGOLOGY

## 2023-01-01 PROCEDURE — 99223 1ST HOSP IP/OBS HIGH 75: CPT

## 2023-01-01 PROCEDURE — 99222 1ST HOSP IP/OBS MODERATE 55: CPT | Performed by: STUDENT IN AN ORGANIZED HEALTH CARE EDUCATION/TRAINING PROGRAM

## 2023-01-01 PROCEDURE — 63710000001 INSULIN REGULAR HUMAN PER 5 UNITS: Performed by: NURSE ANESTHETIST, CERTIFIED REGISTERED

## 2023-01-01 PROCEDURE — 62142 RMVL B1 FLP/PROSTC PLATE SKL: CPT | Performed by: NEUROLOGICAL SURGERY

## 2023-01-01 DEVICE — HEMOST ABS SURGIFOAM SZ100 8X12 10MM: Type: IMPLANTABLE DEVICE | Site: CRANIAL | Status: FUNCTIONAL

## 2023-01-01 DEVICE — ABSORBABLE HEMOSTAT (OXIDIZED REGENERATED CELLULOSE, U.S.P.)
Type: IMPLANTABLE DEVICE | Site: SCALP | Status: FUNCTIONAL
Brand: SURGICEL

## 2023-01-01 DEVICE — CLIPAPPLR SCLP HEMO PRELD 1P/U STRL: Type: IMPLANTABLE DEVICE | Site: SCALP | Status: FUNCTIONAL

## 2023-01-01 DEVICE — KT HEMOST ABS SURGIFOAM PORCN 1GRAM: Type: IMPLANTABLE DEVICE | Site: CRANIAL | Status: FUNCTIONAL

## 2023-01-01 RX ORDER — NOREPINEPHRINE BITARTRATE 0.03 MG/ML
INJECTION, SOLUTION INTRAVENOUS
Status: COMPLETED
Start: 2023-01-01 | End: 2023-01-01

## 2023-01-01 RX ORDER — LINEZOLID 2 MG/ML
600 INJECTION, SOLUTION INTRAVENOUS EVERY 12 HOURS
Status: DISCONTINUED | OUTPATIENT
Start: 2023-01-01 | End: 2023-01-01

## 2023-01-01 RX ORDER — AMIODARONE HYDROCHLORIDE 50 MG/ML
INJECTION, SOLUTION INTRAVENOUS
Status: COMPLETED | OUTPATIENT
Start: 2023-01-01 | End: 2023-01-01

## 2023-01-01 RX ORDER — DIGOXIN 0.25 MG/ML
250 INJECTION INTRAMUSCULAR; INTRAVENOUS ONCE
Status: COMPLETED | OUTPATIENT
Start: 2023-01-01 | End: 2023-01-01

## 2023-01-01 RX ORDER — LIDOCAINE HYDROCHLORIDE 10 MG/ML
1 INJECTION, SOLUTION EPIDURAL; INFILTRATION; INTRACAUDAL; PERINEURAL ONCE
Status: COMPLETED | OUTPATIENT
Start: 2023-01-01 | End: 2023-01-01

## 2023-01-01 RX ORDER — LIDOCAINE HYDROCHLORIDE 10 MG/ML
0.5 INJECTION, SOLUTION EPIDURAL; INFILTRATION; INTRACAUDAL; PERINEURAL ONCE AS NEEDED
Status: DISCONTINUED | OUTPATIENT
Start: 2023-01-01 | End: 2023-01-01 | Stop reason: HOSPADM

## 2023-01-01 RX ORDER — DEXTROSE MONOHYDRATE 25 G/50ML
10-50 INJECTION, SOLUTION INTRAVENOUS
Status: DISCONTINUED | OUTPATIENT
Start: 2023-01-01 | End: 2023-01-01

## 2023-01-01 RX ORDER — SODIUM CHLORIDE 0.9 % (FLUSH) 0.9 %
10 SYRINGE (ML) INJECTION AS NEEDED
Status: DISCONTINUED | OUTPATIENT
Start: 2023-01-01 | End: 2023-01-01 | Stop reason: HOSPADM

## 2023-01-01 RX ORDER — SODIUM CHLORIDE 9 MG/ML
40 INJECTION, SOLUTION INTRAVENOUS AS NEEDED
Status: DISCONTINUED | OUTPATIENT
Start: 2023-01-01 | End: 2023-01-01 | Stop reason: HOSPADM

## 2023-01-01 RX ORDER — SODIUM CHLORIDE, SODIUM LACTATE, POTASSIUM CHLORIDE, CALCIUM CHLORIDE 600; 310; 30; 20 MG/100ML; MG/100ML; MG/100ML; MG/100ML
1000 INJECTION, SOLUTION INTRAVENOUS CONTINUOUS
Status: DISCONTINUED | OUTPATIENT
Start: 2023-01-01 | End: 2023-01-01

## 2023-01-01 RX ORDER — SODIUM CHLORIDE 0.9 % (FLUSH) 0.9 %
3 SYRINGE (ML) INJECTION EVERY 12 HOURS SCHEDULED
Status: DISCONTINUED | OUTPATIENT
Start: 2023-01-01 | End: 2023-01-01 | Stop reason: HOSPADM

## 2023-01-01 RX ORDER — FUROSEMIDE 10 MG/ML
40 INJECTION INTRAMUSCULAR; INTRAVENOUS DAILY
Status: DISCONTINUED | OUTPATIENT
Start: 2023-01-01 | End: 2023-01-01

## 2023-01-01 RX ORDER — FUROSEMIDE 10 MG/ML
40 INJECTION INTRAMUSCULAR; INTRAVENOUS EVERY 12 HOURS
Status: DISCONTINUED | OUTPATIENT
Start: 2023-01-01 | End: 2023-01-01

## 2023-01-01 RX ORDER — DROPERIDOL 2.5 MG/ML
0.62 INJECTION, SOLUTION INTRAMUSCULAR; INTRAVENOUS ONCE AS NEEDED
Status: DISCONTINUED | OUTPATIENT
Start: 2023-01-01 | End: 2023-01-01 | Stop reason: HOSPADM

## 2023-01-01 RX ORDER — GABAPENTIN 250 MG/5ML
300 SOLUTION ORAL EVERY 8 HOURS SCHEDULED
Status: DISCONTINUED | OUTPATIENT
Start: 2023-01-01 | End: 2023-01-01

## 2023-01-01 RX ORDER — BACITRACIN ZINC 500 [USP'U]/G
OINTMENT TOPICAL AS NEEDED
Status: DISCONTINUED | OUTPATIENT
Start: 2023-01-01 | End: 2023-01-01 | Stop reason: HOSPADM

## 2023-01-01 RX ORDER — DIGOXIN 0.25 MG/ML
125 INJECTION INTRAMUSCULAR; INTRAVENOUS ONCE
Status: COMPLETED | OUTPATIENT
Start: 2023-01-01 | End: 2023-01-01

## 2023-01-01 RX ORDER — OXYCODONE AND ACETAMINOPHEN 10; 325 MG/1; MG/1
1 TABLET ORAL EVERY 4 HOURS PRN
Status: DISPENSED | OUTPATIENT
Start: 2023-01-01 | End: 2023-01-01

## 2023-01-01 RX ORDER — BUDESONIDE 0.5 MG/2ML
0.5 INHALANT ORAL
Status: DISCONTINUED | OUTPATIENT
Start: 2023-01-01 | End: 2023-01-01

## 2023-01-01 RX ORDER — FENTANYL CITRATE 50 UG/ML
25 INJECTION, SOLUTION INTRAMUSCULAR; INTRAVENOUS
Status: DISCONTINUED | OUTPATIENT
Start: 2023-01-01 | End: 2023-01-01 | Stop reason: HOSPADM

## 2023-01-01 RX ORDER — SODIUM CHLORIDE, SODIUM LACTATE, POTASSIUM CHLORIDE, CALCIUM CHLORIDE 600; 310; 30; 20 MG/100ML; MG/100ML; MG/100ML; MG/100ML
1000 INJECTION, SOLUTION INTRAVENOUS CONTINUOUS
Status: DISPENSED | OUTPATIENT
Start: 2023-01-01 | End: 2023-01-01

## 2023-01-01 RX ORDER — LORAZEPAM 2 MG/ML
1 INJECTION INTRAMUSCULAR EVERY 4 HOURS PRN
Status: DISPENSED | OUTPATIENT
Start: 2023-01-01 | End: 2023-01-01

## 2023-01-01 RX ORDER — OXYCODONE AND ACETAMINOPHEN 10; 325 MG/1; MG/1
1 TABLET ORAL ONCE AS NEEDED
Status: DISCONTINUED | OUTPATIENT
Start: 2023-01-01 | End: 2023-01-01 | Stop reason: HOSPADM

## 2023-01-01 RX ORDER — DEXTROSE AND SODIUM CHLORIDE 5; .45 G/100ML; G/100ML
150 INJECTION, SOLUTION INTRAVENOUS CONTINUOUS PRN
Status: DISCONTINUED | OUTPATIENT
Start: 2023-01-01 | End: 2023-01-01

## 2023-01-01 RX ORDER — AMLODIPINE BESYLATE 10 MG/1
10 TABLET ORAL
Status: DISCONTINUED | OUTPATIENT
Start: 2023-01-01 | End: 2023-01-01

## 2023-01-01 RX ORDER — SODIUM CHLORIDE 0.9 % (FLUSH) 0.9 %
10 SYRINGE (ML) INJECTION EVERY 12 HOURS SCHEDULED
Status: DISCONTINUED | OUTPATIENT
Start: 2023-01-01 | End: 2023-01-01 | Stop reason: HOSPADM

## 2023-01-01 RX ORDER — ONDANSETRON 4 MG/1
4 TABLET, FILM COATED ORAL EVERY 6 HOURS PRN
Status: DISCONTINUED | OUTPATIENT
Start: 2023-01-01 | End: 2023-01-01 | Stop reason: HOSPADM

## 2023-01-01 RX ORDER — HYDROMORPHONE HYDROCHLORIDE 1 MG/ML
0.5 INJECTION, SOLUTION INTRAMUSCULAR; INTRAVENOUS; SUBCUTANEOUS
Status: DISCONTINUED | OUTPATIENT
Start: 2023-01-01 | End: 2023-01-01 | Stop reason: HOSPADM

## 2023-01-01 RX ORDER — DILTIAZEM HCL 90 MG
90 TABLET ORAL EVERY 8 HOURS SCHEDULED
Status: DISCONTINUED | OUTPATIENT
Start: 2023-01-01 | End: 2023-01-01

## 2023-01-01 RX ORDER — SODIUM CHLORIDE 9 MG/ML
40 INJECTION, SOLUTION INTRAVENOUS AS NEEDED
Status: DISCONTINUED | OUTPATIENT
Start: 2023-01-01 | End: 2023-01-01

## 2023-01-01 RX ORDER — CHLORHEXIDINE GLUCONATE 500 MG/1
1 CLOTH TOPICAL ONCE
Status: COMPLETED | OUTPATIENT
Start: 2023-01-01 | End: 2023-01-01

## 2023-01-01 RX ORDER — NALOXONE HCL 0.4 MG/ML
0.04 VIAL (ML) INJECTION AS NEEDED
Status: DISCONTINUED | OUTPATIENT
Start: 2023-01-01 | End: 2023-01-01 | Stop reason: HOSPADM

## 2023-01-01 RX ORDER — INSULIN LISPRO 100 [IU]/ML
0-14 INJECTION, SOLUTION INTRAVENOUS; SUBCUTANEOUS
Status: DISCONTINUED | OUTPATIENT
Start: 2023-01-01 | End: 2023-01-01

## 2023-01-01 RX ORDER — CHLORHEXIDINE GLUCONATE 500 MG/1
1 CLOTH TOPICAL EVERY 24 HOURS
Status: DISCONTINUED | OUTPATIENT
Start: 2023-01-01 | End: 2023-01-01 | Stop reason: HOSPADM

## 2023-01-01 RX ORDER — IPRATROPIUM BROMIDE AND ALBUTEROL SULFATE 2.5; .5 MG/3ML; MG/3ML
3 SOLUTION RESPIRATORY (INHALATION)
Status: DISCONTINUED | OUTPATIENT
Start: 2023-01-01 | End: 2023-01-01

## 2023-01-01 RX ORDER — SUCRALFATE 1 G/1
1 TABLET ORAL
Status: DISCONTINUED | OUTPATIENT
Start: 2023-01-01 | End: 2023-01-01

## 2023-01-01 RX ORDER — SODIUM CHLORIDE 0.9 % (FLUSH) 0.9 %
20 SYRINGE (ML) INJECTION AS NEEDED
Status: DISCONTINUED | OUTPATIENT
Start: 2023-01-01 | End: 2023-01-01 | Stop reason: HOSPADM

## 2023-01-01 RX ORDER — DEXTROSE MONOHYDRATE 25 G/50ML
25 INJECTION, SOLUTION INTRAVENOUS
Status: DISCONTINUED | OUTPATIENT
Start: 2023-01-01 | End: 2023-01-01

## 2023-01-01 RX ORDER — MAGNESIUM SULFATE HEPTAHYDRATE 500 MG/ML
INJECTION, SOLUTION INTRAMUSCULAR; INTRAVENOUS
Status: COMPLETED | OUTPATIENT
Start: 2023-01-01 | End: 2023-01-01

## 2023-01-01 RX ORDER — BUPIVACAINE HYDROCHLORIDE AND EPINEPHRINE 2.5; 5 MG/ML; UG/ML
INJECTION, SOLUTION EPIDURAL; INFILTRATION; INTRACAUDAL; PERINEURAL AS NEEDED
Status: DISCONTINUED | OUTPATIENT
Start: 2023-01-01 | End: 2023-01-01 | Stop reason: HOSPADM

## 2023-01-01 RX ORDER — ACETAMINOPHEN 160 MG/5ML
650 SOLUTION ORAL EVERY 4 HOURS PRN
Status: DISCONTINUED | OUTPATIENT
Start: 2023-01-01 | End: 2023-01-01 | Stop reason: HOSPADM

## 2023-01-01 RX ORDER — GABAPENTIN 300 MG/1
300 CAPSULE ORAL 3 TIMES DAILY
Status: DISCONTINUED | OUTPATIENT
Start: 2023-01-01 | End: 2023-01-01

## 2023-01-01 RX ORDER — FLUMAZENIL 0.1 MG/ML
0.2 INJECTION INTRAVENOUS AS NEEDED
Status: DISCONTINUED | OUTPATIENT
Start: 2023-01-01 | End: 2023-01-01 | Stop reason: HOSPADM

## 2023-01-01 RX ORDER — CHLORHEXIDINE GLUCONATE 500 MG/1
1 CLOTH TOPICAL EVERY 24 HOURS
Status: DISCONTINUED | OUTPATIENT
Start: 2023-01-01 | End: 2023-01-01

## 2023-01-01 RX ORDER — LORAZEPAM 2 MG/ML
INJECTION INTRAMUSCULAR
Status: COMPLETED
Start: 2023-01-01 | End: 2023-01-01

## 2023-01-01 RX ORDER — METHYLPREDNISOLONE SODIUM SUCCINATE 125 MG/2ML
60 INJECTION, POWDER, LYOPHILIZED, FOR SOLUTION INTRAMUSCULAR; INTRAVENOUS EVERY 8 HOURS
Status: DISCONTINUED | OUTPATIENT
Start: 2023-01-01 | End: 2023-01-01

## 2023-01-01 RX ORDER — FUROSEMIDE 40 MG/1
40 TABLET ORAL DAILY
Status: DISCONTINUED | OUTPATIENT
Start: 2023-01-01 | End: 2023-01-01

## 2023-01-01 RX ORDER — NICOTINE POLACRILEX 4 MG
15 LOZENGE BUCCAL
Status: DISCONTINUED | OUTPATIENT
Start: 2023-01-01 | End: 2023-01-01 | Stop reason: HOSPADM

## 2023-01-01 RX ORDER — NICOTINE POLACRILEX 4 MG
15 LOZENGE BUCCAL
Status: DISCONTINUED | OUTPATIENT
Start: 2023-01-01 | End: 2023-01-01

## 2023-01-01 RX ORDER — HEPARIN SODIUM 5000 [USP'U]/ML
5000 INJECTION, SOLUTION INTRAVENOUS; SUBCUTANEOUS EVERY 12 HOURS SCHEDULED
Status: DISCONTINUED | OUTPATIENT
Start: 2023-01-01 | End: 2023-01-01

## 2023-01-01 RX ORDER — POLYETHYLENE GLYCOL 3350 17 G/17G
17 POWDER, FOR SOLUTION ORAL DAILY
Status: DISCONTINUED | OUTPATIENT
Start: 2023-01-01 | End: 2023-01-01 | Stop reason: HOSPADM

## 2023-01-01 RX ORDER — OXYCODONE AND ACETAMINOPHEN 7.5; 325 MG/1; MG/1
1 TABLET ORAL EVERY 4 HOURS PRN
Status: DISPENSED | OUTPATIENT
Start: 2023-01-01 | End: 2023-01-01

## 2023-01-01 RX ORDER — DEXTROSE, SODIUM CHLORIDE, AND POTASSIUM CHLORIDE 5; .45; .15 G/100ML; G/100ML; G/100ML
150 INJECTION INTRAVENOUS CONTINUOUS PRN
Status: DISCONTINUED | OUTPATIENT
Start: 2023-01-01 | End: 2023-01-01

## 2023-01-01 RX ORDER — POTASSIUM CHLORIDE 29.8 MG/ML
20 INJECTION INTRAVENOUS
Status: DISCONTINUED | OUTPATIENT
Start: 2023-01-01 | End: 2023-01-01 | Stop reason: HOSPADM

## 2023-01-01 RX ORDER — MORPHINE SULFATE 2 MG/ML
2 INJECTION, SOLUTION INTRAMUSCULAR; INTRAVENOUS ONCE
Status: COMPLETED | OUTPATIENT
Start: 2023-01-01 | End: 2023-01-01

## 2023-01-01 RX ORDER — BUDESONIDE AND FORMOTEROL FUMARATE DIHYDRATE 160; 4.5 UG/1; UG/1
2 AEROSOL RESPIRATORY (INHALATION)
Status: DISCONTINUED | OUTPATIENT
Start: 2023-01-01 | End: 2023-01-01

## 2023-01-01 RX ORDER — HEPARIN SODIUM 5000 [USP'U]/ML
5000 INJECTION, SOLUTION INTRAVENOUS; SUBCUTANEOUS EVERY 12 HOURS SCHEDULED
Status: DISCONTINUED | OUTPATIENT
Start: 2023-01-01 | End: 2023-01-01 | Stop reason: HOSPADM

## 2023-01-01 RX ORDER — DEXTROSE, SODIUM CHLORIDE, AND POTASSIUM CHLORIDE 5; .45; .3 G/100ML; G/100ML; G/100ML
150 INJECTION INTRAVENOUS CONTINUOUS PRN
Status: DISCONTINUED | OUTPATIENT
Start: 2023-01-01 | End: 2023-01-01

## 2023-01-01 RX ORDER — LISINOPRIL 20 MG/1
20 TABLET ORAL DAILY
Status: DISCONTINUED | OUTPATIENT
Start: 2023-01-01 | End: 2023-01-01

## 2023-01-01 RX ORDER — MORPHINE SULFATE 2 MG/ML
INJECTION, SOLUTION INTRAMUSCULAR; INTRAVENOUS
Status: COMPLETED
Start: 2023-01-01 | End: 2023-01-01

## 2023-01-01 RX ORDER — MAGNESIUM HYDROXIDE 1200 MG/15ML
LIQUID ORAL AS NEEDED
Status: DISCONTINUED | OUTPATIENT
Start: 2023-01-01 | End: 2023-01-01 | Stop reason: HOSPADM

## 2023-01-01 RX ORDER — METOCLOPRAMIDE 10 MG/1
10 TABLET ORAL
Status: DISCONTINUED | OUTPATIENT
Start: 2023-01-01 | End: 2023-01-01

## 2023-01-01 RX ORDER — METOPROLOL SUCCINATE 50 MG/1
50 TABLET, EXTENDED RELEASE ORAL
Status: DISCONTINUED | OUTPATIENT
Start: 2023-01-01 | End: 2023-01-01

## 2023-01-01 RX ORDER — PANTOPRAZOLE SODIUM 40 MG/1
40 TABLET, DELAYED RELEASE ORAL
Status: DISCONTINUED | OUTPATIENT
Start: 2023-01-01 | End: 2023-01-01

## 2023-01-01 RX ORDER — DEXMEDETOMIDINE HYDROCHLORIDE 4 UG/ML
.2-1.5 INJECTION, SOLUTION INTRAVENOUS
Status: DISCONTINUED | OUTPATIENT
Start: 2023-01-01 | End: 2023-01-01 | Stop reason: HOSPADM

## 2023-01-01 RX ORDER — ASPIRIN 81 MG/1
81 TABLET, CHEWABLE ORAL DAILY
Status: DISCONTINUED | OUTPATIENT
Start: 2023-01-01 | End: 2023-01-01 | Stop reason: HOSPADM

## 2023-01-01 RX ORDER — POTASSIUM CHLORIDE 1.5 G/1.77G
40 POWDER, FOR SOLUTION ORAL AS NEEDED
Status: DISCONTINUED | OUTPATIENT
Start: 2023-01-01 | End: 2023-01-01 | Stop reason: HOSPADM

## 2023-01-01 RX ORDER — LIDOCAINE HYDROCHLORIDE AND EPINEPHRINE 10; 10 MG/ML; UG/ML
INJECTION, SOLUTION INFILTRATION; PERINEURAL AS NEEDED
Status: DISCONTINUED | OUTPATIENT
Start: 2023-01-01 | End: 2023-01-01 | Stop reason: HOSPADM

## 2023-01-01 RX ORDER — METHYLPREDNISOLONE SODIUM SUCCINATE 40 MG/ML
40 INJECTION, POWDER, LYOPHILIZED, FOR SOLUTION INTRAMUSCULAR; INTRAVENOUS EVERY 12 HOURS
Status: DISCONTINUED | OUTPATIENT
Start: 2023-01-01 | End: 2023-01-01

## 2023-01-01 RX ORDER — SODIUM CHLORIDE, SODIUM LACTATE, POTASSIUM CHLORIDE, CALCIUM CHLORIDE 600; 310; 30; 20 MG/100ML; MG/100ML; MG/100ML; MG/100ML
100 INJECTION, SOLUTION INTRAVENOUS CONTINUOUS
Status: DISCONTINUED | OUTPATIENT
Start: 2023-01-01 | End: 2023-01-01

## 2023-01-01 RX ORDER — FUROSEMIDE 10 MG/ML
40 INJECTION INTRAMUSCULAR; INTRAVENOUS ONCE
Status: COMPLETED | OUTPATIENT
Start: 2023-01-01 | End: 2023-01-01

## 2023-01-01 RX ORDER — SODIUM CHLORIDE 9 MG/ML
250 INJECTION, SOLUTION INTRAVENOUS CONTINUOUS PRN
Status: DISCONTINUED | OUTPATIENT
Start: 2023-01-01 | End: 2023-01-01

## 2023-01-01 RX ORDER — PANTOPRAZOLE SODIUM 40 MG/10ML
40 INJECTION, POWDER, LYOPHILIZED, FOR SOLUTION INTRAVENOUS
Status: DISCONTINUED | OUTPATIENT
Start: 2023-01-01 | End: 2023-01-01 | Stop reason: HOSPADM

## 2023-01-01 RX ORDER — DILTIAZEM HYDROCHLORIDE 240 MG/1
240 CAPSULE, COATED, EXTENDED RELEASE ORAL
Status: DISCONTINUED | OUTPATIENT
Start: 2023-01-01 | End: 2023-01-01

## 2023-01-01 RX ORDER — ATORVASTATIN CALCIUM 10 MG/1
20 TABLET, FILM COATED ORAL NIGHTLY
Status: DISCONTINUED | OUTPATIENT
Start: 2023-01-01 | End: 2023-01-01

## 2023-01-01 RX ORDER — FUROSEMIDE 10 MG/ML
20 INJECTION INTRAMUSCULAR; INTRAVENOUS EVERY 12 HOURS
Status: DISCONTINUED | OUTPATIENT
Start: 2023-01-01 | End: 2023-01-01 | Stop reason: HOSPADM

## 2023-01-01 RX ORDER — SODIUM CHLORIDE 450 MG/100ML
250 INJECTION, SOLUTION INTRAVENOUS CONTINUOUS PRN
Status: DISCONTINUED | OUTPATIENT
Start: 2023-01-01 | End: 2023-01-01

## 2023-01-01 RX ORDER — DEXTROSE AND SODIUM CHLORIDE 5; .9 G/100ML; G/100ML
150 INJECTION, SOLUTION INTRAVENOUS CONTINUOUS PRN
Status: DISCONTINUED | OUTPATIENT
Start: 2023-01-01 | End: 2023-01-01

## 2023-01-01 RX ORDER — SODIUM CHLORIDE AND POTASSIUM CHLORIDE 150; 900 MG/100ML; MG/100ML
250 INJECTION, SOLUTION INTRAVENOUS CONTINUOUS PRN
Status: DISCONTINUED | OUTPATIENT
Start: 2023-01-01 | End: 2023-01-01

## 2023-01-01 RX ORDER — ENOXAPARIN SODIUM 150 MG/ML
1 INJECTION SUBCUTANEOUS EVERY 12 HOURS SCHEDULED
Status: DISCONTINUED | OUTPATIENT
Start: 2023-01-01 | End: 2023-01-01

## 2023-01-01 RX ORDER — SODIUM CHLORIDE 0.9 % (FLUSH) 0.9 %
10 SYRINGE (ML) INJECTION EVERY 12 HOURS SCHEDULED
Status: DISCONTINUED | OUTPATIENT
Start: 2023-01-01 | End: 2023-01-01

## 2023-01-01 RX ORDER — ASPIRIN 81 MG/1
81 TABLET ORAL DAILY
COMMUNITY

## 2023-01-01 RX ORDER — NOREPINEPHRINE BITARTRATE 0.03 MG/ML
.02-.3 INJECTION, SOLUTION INTRAVENOUS
Status: DISCONTINUED | OUTPATIENT
Start: 2023-01-01 | End: 2023-01-01

## 2023-01-01 RX ORDER — HYDROMORPHONE HYDROCHLORIDE 1 MG/ML
0.5 INJECTION, SOLUTION INTRAMUSCULAR; INTRAVENOUS; SUBCUTANEOUS
Status: DISCONTINUED | OUTPATIENT
Start: 2023-01-01 | End: 2023-01-01

## 2023-01-01 RX ORDER — DILTIAZEM HYDROCHLORIDE 300 MG/1
300 CAPSULE, COATED, EXTENDED RELEASE ORAL
Status: DISCONTINUED | OUTPATIENT
Start: 2023-01-01 | End: 2023-01-01

## 2023-01-01 RX ORDER — LABETALOL HYDROCHLORIDE 5 MG/ML
5 INJECTION, SOLUTION INTRAVENOUS
Status: DISCONTINUED | OUTPATIENT
Start: 2023-01-01 | End: 2023-01-01 | Stop reason: HOSPADM

## 2023-01-01 RX ORDER — DIGOXIN 250 MCG
250 TABLET ORAL
Status: DISCONTINUED | OUTPATIENT
Start: 2023-01-01 | End: 2023-01-01

## 2023-01-01 RX ORDER — DEXTROSE MONOHYDRATE 25 G/50ML
25 INJECTION, SOLUTION INTRAVENOUS
Status: DISCONTINUED | OUTPATIENT
Start: 2023-01-01 | End: 2023-01-01 | Stop reason: HOSPADM

## 2023-01-01 RX ORDER — INSULIN LISPRO 100 [IU]/ML
0-14 INJECTION, SOLUTION INTRAVENOUS; SUBCUTANEOUS EVERY 6 HOURS SCHEDULED
Status: DISCONTINUED | OUTPATIENT
Start: 2023-01-01 | End: 2023-01-01 | Stop reason: HOSPADM

## 2023-01-01 RX ORDER — DEXTROSE, SODIUM CHLORIDE, AND POTASSIUM CHLORIDE 5; .9; .15 G/100ML; G/100ML; G/100ML
150 INJECTION INTRAVENOUS CONTINUOUS PRN
Status: DISCONTINUED | OUTPATIENT
Start: 2023-01-01 | End: 2023-01-01

## 2023-01-01 RX ORDER — EPINEPHRINE 0.1 MG/ML
SYRINGE (ML) INJECTION
Status: COMPLETED | OUTPATIENT
Start: 2023-01-01 | End: 2023-01-01

## 2023-01-01 RX ORDER — FUROSEMIDE 10 MG/ML
40 INJECTION INTRAMUSCULAR; INTRAVENOUS
Status: DISCONTINUED | OUTPATIENT
Start: 2023-01-01 | End: 2023-01-01

## 2023-01-01 RX ORDER — SODIUM CHLORIDE AND POTASSIUM CHLORIDE 300; 900 MG/100ML; MG/100ML
250 INJECTION, SOLUTION INTRAVENOUS CONTINUOUS PRN
Status: DISCONTINUED | OUTPATIENT
Start: 2023-01-01 | End: 2023-01-01

## 2023-01-01 RX ORDER — POTASSIUM CHLORIDE 750 MG/1
20 CAPSULE, EXTENDED RELEASE ORAL DAILY
Status: DISCONTINUED | OUTPATIENT
Start: 2023-01-01 | End: 2023-01-01

## 2023-01-01 RX ORDER — SODIUM CHLORIDE 0.9 % (FLUSH) 0.9 %
10 SYRINGE (ML) INJECTION AS NEEDED
Status: DISCONTINUED | OUTPATIENT
Start: 2023-01-01 | End: 2023-01-01

## 2023-01-01 RX ORDER — SODIUM CHLORIDE 0.9 % (FLUSH) 0.9 %
3 SYRINGE (ML) INJECTION AS NEEDED
Status: DISCONTINUED | OUTPATIENT
Start: 2023-01-01 | End: 2023-01-01 | Stop reason: HOSPADM

## 2023-01-01 RX ORDER — DEXTROSE MONOHYDRATE 50 MG/ML
75 INJECTION, SOLUTION INTRAVENOUS CONTINUOUS
Status: DISCONTINUED | OUTPATIENT
Start: 2023-01-01 | End: 2023-01-01

## 2023-01-01 RX ORDER — AMOXICILLIN 250 MG
1 CAPSULE ORAL DAILY
Status: DISCONTINUED | OUTPATIENT
Start: 2023-01-01 | End: 2023-01-01 | Stop reason: HOSPADM

## 2023-01-01 RX ORDER — INSULIN LISPRO 100 [IU]/ML
4-24 INJECTION, SOLUTION INTRAVENOUS; SUBCUTANEOUS
Status: DISCONTINUED | OUTPATIENT
Start: 2023-01-01 | End: 2023-01-01

## 2023-01-01 RX ORDER — DIGOXIN 0.25 MG/ML
250 INJECTION INTRAMUSCULAR; INTRAVENOUS
Status: DISCONTINUED | OUTPATIENT
Start: 2023-01-01 | End: 2023-01-01

## 2023-01-01 RX ORDER — VANCOMYCIN HYDROCHLORIDE 1 G/20ML
INJECTION, POWDER, LYOPHILIZED, FOR SOLUTION INTRAVENOUS AS NEEDED
Status: DISCONTINUED | OUTPATIENT
Start: 2023-01-01 | End: 2023-01-01 | Stop reason: HOSPADM

## 2023-01-01 RX ORDER — MULTIPLE VITAMINS W/ MINERALS TAB 9MG-400MCG
1 TAB ORAL DAILY
Status: DISCONTINUED | OUTPATIENT
Start: 2023-01-01 | End: 2023-01-01 | Stop reason: HOSPADM

## 2023-01-01 RX ORDER — NICOTINE 21 MG/24HR
1 PATCH, TRANSDERMAL 24 HOURS TRANSDERMAL DAILY PRN
Status: DISCONTINUED | OUTPATIENT
Start: 2023-01-01 | End: 2023-01-01

## 2023-01-01 RX ORDER — HYDROXYZINE HYDROCHLORIDE 25 MG/1
25 TABLET, FILM COATED ORAL 3 TIMES DAILY PRN
Status: DISCONTINUED | OUTPATIENT
Start: 2023-01-01 | End: 2023-01-01 | Stop reason: HOSPADM

## 2023-01-01 RX ORDER — SODIUM CHLORIDE AND POTASSIUM CHLORIDE 150; 450 MG/100ML; MG/100ML
250 INJECTION, SOLUTION INTRAVENOUS CONTINUOUS PRN
Status: DISCONTINUED | OUTPATIENT
Start: 2023-01-01 | End: 2023-01-01

## 2023-01-01 RX ORDER — LEVETIRACETAM 500 MG/1
500 TABLET ORAL 2 TIMES DAILY
Status: DISCONTINUED | OUTPATIENT
Start: 2023-01-01 | End: 2023-01-01 | Stop reason: HOSPADM

## 2023-01-01 RX ORDER — METOLAZONE 5 MG/1
5 TABLET ORAL ONCE
Status: COMPLETED | OUTPATIENT
Start: 2023-01-01 | End: 2023-01-01

## 2023-01-01 RX ORDER — SODIUM CHLORIDE 0.9 % (FLUSH) 0.9 %
3-10 SYRINGE (ML) INJECTION AS NEEDED
Status: DISCONTINUED | OUTPATIENT
Start: 2023-01-01 | End: 2023-01-01 | Stop reason: HOSPADM

## 2023-01-01 RX ORDER — CALCIUM CARBONATE 200(500)MG
2 TABLET,CHEWABLE ORAL 3 TIMES DAILY PRN
Status: DISCONTINUED | OUTPATIENT
Start: 2023-01-01 | End: 2023-01-01 | Stop reason: HOSPADM

## 2023-01-01 RX ORDER — FUROSEMIDE 20 MG/1
20 TABLET ORAL DAILY
Status: DISCONTINUED | OUTPATIENT
Start: 2023-01-01 | End: 2023-01-01

## 2023-01-01 RX ORDER — ESMOLOL HYDROCHLORIDE 10 MG/ML
25-300 INJECTION INTRAVENOUS
Status: DISCONTINUED | OUTPATIENT
Start: 2023-01-01 | End: 2023-01-01

## 2023-01-01 RX ORDER — METHYLPREDNISOLONE SODIUM SUCCINATE 125 MG/2ML
60 INJECTION, POWDER, LYOPHILIZED, FOR SOLUTION INTRAMUSCULAR; INTRAVENOUS EVERY 12 HOURS
Status: DISCONTINUED | OUTPATIENT
Start: 2023-01-01 | End: 2023-01-01

## 2023-01-01 RX ORDER — ZIPRASIDONE MESYLATE 20 MG/ML
20 INJECTION, POWDER, LYOPHILIZED, FOR SOLUTION INTRAMUSCULAR ONCE
Status: COMPLETED | OUTPATIENT
Start: 2023-01-01 | End: 2023-01-01

## 2023-01-01 RX ORDER — ACETAMINOPHEN 325 MG/1
650 TABLET ORAL EVERY 4 HOURS PRN
Status: DISCONTINUED | OUTPATIENT
Start: 2023-01-01 | End: 2023-01-01 | Stop reason: HOSPADM

## 2023-01-01 RX ORDER — POTASSIUM CHLORIDE, DEXTROSE MONOHYDRATE AND SODIUM CHLORIDE 300; 5; 900 MG/100ML; G/100ML; MG/100ML
150 INJECTION, SOLUTION INTRAVENOUS CONTINUOUS PRN
Status: DISCONTINUED | OUTPATIENT
Start: 2023-01-01 | End: 2023-01-01

## 2023-01-01 RX ORDER — IPRATROPIUM BROMIDE AND ALBUTEROL SULFATE 2.5; .5 MG/3ML; MG/3ML
3 SOLUTION RESPIRATORY (INHALATION) EVERY 4 HOURS PRN
Status: DISCONTINUED | OUTPATIENT
Start: 2023-01-01 | End: 2023-01-01 | Stop reason: HOSPADM

## 2023-01-01 RX ORDER — LEVOFLOXACIN 500 MG/1
500 TABLET, FILM COATED ORAL EVERY 24 HOURS
Status: DISCONTINUED | OUTPATIENT
Start: 2023-01-01 | End: 2023-01-01

## 2023-01-01 RX ORDER — ONDANSETRON 2 MG/ML
4 INJECTION INTRAMUSCULAR; INTRAVENOUS
Status: DISCONTINUED | OUTPATIENT
Start: 2023-01-01 | End: 2023-01-01 | Stop reason: HOSPADM

## 2023-01-01 RX ORDER — DIGOXIN 125 MCG
125 TABLET ORAL
Status: DISCONTINUED | OUTPATIENT
Start: 2023-01-01 | End: 2023-01-01

## 2023-01-01 RX ORDER — POTASSIUM CHLORIDE 750 MG/1
40 CAPSULE, EXTENDED RELEASE ORAL AS NEEDED
Status: DISCONTINUED | OUTPATIENT
Start: 2023-01-01 | End: 2023-01-01 | Stop reason: HOSPADM

## 2023-01-01 RX ORDER — METHYLPREDNISOLONE SODIUM SUCCINATE 40 MG/ML
20 INJECTION, POWDER, LYOPHILIZED, FOR SOLUTION INTRAMUSCULAR; INTRAVENOUS EVERY 12 HOURS
Status: COMPLETED | OUTPATIENT
Start: 2023-01-01 | End: 2023-01-01

## 2023-01-01 RX ORDER — INSULIN LISPRO 100 [IU]/ML
10 INJECTION, SOLUTION INTRAVENOUS; SUBCUTANEOUS ONCE
Status: DISCONTINUED | OUTPATIENT
Start: 2023-01-01 | End: 2023-01-01

## 2023-01-01 RX ORDER — ACETAMINOPHEN 650 MG/1
650 SUPPOSITORY RECTAL EVERY 4 HOURS PRN
Status: DISCONTINUED | OUTPATIENT
Start: 2023-01-01 | End: 2023-01-01 | Stop reason: HOSPADM

## 2023-01-01 RX ORDER — DILTIAZEM HYDROCHLORIDE 60 MG/1
60 TABLET, FILM COATED ORAL EVERY 6 HOURS SCHEDULED
Status: DISCONTINUED | OUTPATIENT
Start: 2023-01-01 | End: 2023-01-01

## 2023-01-01 RX ORDER — BUTALBITAL, ACETAMINOPHEN AND CAFFEINE 50; 325; 40 MG/1; MG/1; MG/1
1 TABLET ORAL EVERY 6 HOURS PRN
Status: DISCONTINUED | OUTPATIENT
Start: 2023-01-01 | End: 2023-01-01 | Stop reason: HOSPADM

## 2023-01-01 RX ORDER — METOPROLOL TARTRATE 50 MG/1
50 TABLET, FILM COATED ORAL EVERY 12 HOURS SCHEDULED
Status: DISCONTINUED | OUTPATIENT
Start: 2023-01-01 | End: 2023-01-01

## 2023-01-01 RX ORDER — ONDANSETRON 2 MG/ML
4 INJECTION INTRAMUSCULAR; INTRAVENOUS EVERY 6 HOURS PRN
Status: DISCONTINUED | OUTPATIENT
Start: 2023-01-01 | End: 2023-01-01 | Stop reason: HOSPADM

## 2023-01-01 RX ADMIN — INSULIN DETEMIR 25 UNITS: 100 INJECTION, SOLUTION SUBCUTANEOUS at 08:02

## 2023-01-01 RX ADMIN — PANTOPRAZOLE SODIUM 40 MG: 40 INJECTION, POWDER, FOR SOLUTION INTRAVENOUS at 05:41

## 2023-01-01 RX ADMIN — IPRATROPIUM BROMIDE 0.5 MG: 0.5 SOLUTION RESPIRATORY (INHALATION) at 19:10

## 2023-01-01 RX ADMIN — IPRATROPIUM BROMIDE 0.5 MG: 0.5 SOLUTION RESPIRATORY (INHALATION) at 10:15

## 2023-01-01 RX ADMIN — LEVETIRACETAM 500 MG: 500 TABLET, FILM COATED ORAL at 09:41

## 2023-01-01 RX ADMIN — LEVETIRACETAM 500 MG: 500 TABLET, FILM COATED ORAL at 08:00

## 2023-01-01 RX ADMIN — INSULIN LISPRO 10 UNITS: 100 INJECTION, SOLUTION INTRAVENOUS; SUBCUTANEOUS at 16:50

## 2023-01-01 RX ADMIN — METOCLOPRAMIDE 10 MG: 10 TABLET ORAL at 21:09

## 2023-01-01 RX ADMIN — GABAPENTIN 300 MG: 250 SOLUTION ORAL at 17:23

## 2023-01-01 RX ADMIN — MEROPENEM 1 G: 1 INJECTION, POWDER, FOR SOLUTION INTRAVENOUS at 15:34

## 2023-01-01 RX ADMIN — GABAPENTIN 300 MG: 300 CAPSULE ORAL at 20:01

## 2023-01-01 RX ADMIN — METOPROLOL TARTRATE 50 MG: 50 TABLET, FILM COATED ORAL at 19:51

## 2023-01-01 RX ADMIN — BUDESONIDE 0.5 MG: 0.5 INHALANT RESPIRATORY (INHALATION) at 19:06

## 2023-01-01 RX ADMIN — SUCRALFATE 1 G: 1 TABLET ORAL at 17:18

## 2023-01-01 RX ADMIN — INSULIN LISPRO 8 UNITS: 100 INJECTION, SOLUTION INTRAVENOUS; SUBCUTANEOUS at 12:09

## 2023-01-01 RX ADMIN — SUCRALFATE 1 G: 1 TABLET ORAL at 17:28

## 2023-01-01 RX ADMIN — TAZOBACTAM SODIUM AND PIPERACILLIN SODIUM 4.5 G: 500; 4 INJECTION, SOLUTION INTRAVENOUS at 21:53

## 2023-01-01 RX ADMIN — HYDROXYZINE HYDROCHLORIDE 25 MG: 25 TABLET ORAL at 18:25

## 2023-01-01 RX ADMIN — Medication 10 ML: at 08:43

## 2023-01-01 RX ADMIN — METHYLPREDNISOLONE SODIUM SUCCINATE 60 MG: 125 INJECTION, POWDER, FOR SOLUTION INTRAMUSCULAR; INTRAVENOUS at 17:32

## 2023-01-01 RX ADMIN — INSULIN LISPRO 10 UNITS: 100 INJECTION, SOLUTION INTRAVENOUS; SUBCUTANEOUS at 07:55

## 2023-01-01 RX ADMIN — LEVETIRACETAM 500 MG: 500 TABLET, FILM COATED ORAL at 21:56

## 2023-01-01 RX ADMIN — FUROSEMIDE 40 MG: 10 INJECTION, SOLUTION INTRAMUSCULAR; INTRAVENOUS at 10:20

## 2023-01-01 RX ADMIN — IPRATROPIUM BROMIDE 0.5 MG: 0.5 SOLUTION RESPIRATORY (INHALATION) at 18:43

## 2023-01-01 RX ADMIN — METOCLOPRAMIDE 10 MG: 10 TABLET ORAL at 08:06

## 2023-01-01 RX ADMIN — METHYLPREDNISOLONE SODIUM SUCCINATE 60 MG: 125 INJECTION, POWDER, FOR SOLUTION INTRAMUSCULAR; INTRAVENOUS at 03:14

## 2023-01-01 RX ADMIN — Medication 10 ML: at 09:01

## 2023-01-01 RX ADMIN — METOCLOPRAMIDE 10 MG: 10 TABLET ORAL at 20:36

## 2023-01-01 RX ADMIN — BUDESONIDE 0.5 MG: 0.5 INHALANT RESPIRATORY (INHALATION) at 18:23

## 2023-01-01 RX ADMIN — BUDESONIDE AND FORMOTEROL FUMARATE DIHYDRATE 2 PUFF: 160; 4.5 AEROSOL RESPIRATORY (INHALATION) at 19:43

## 2023-01-01 RX ADMIN — HUMAN INSULIN 20 UNITS: 100 INJECTION, SUSPENSION SUBCUTANEOUS at 05:38

## 2023-01-01 RX ADMIN — GABAPENTIN 300 MG: 250 SOLUTION ORAL at 21:22

## 2023-01-01 RX ADMIN — ACETAMINOPHEN 650 MG: 325 TABLET ORAL at 02:48

## 2023-01-01 RX ADMIN — OXYCODONE AND ACETAMINOPHEN 1 TABLET: 325; 10 TABLET ORAL at 16:06

## 2023-01-01 RX ADMIN — BUDESONIDE AND FORMOTEROL FUMARATE DIHYDRATE 2 PUFF: 160; 4.5 AEROSOL RESPIRATORY (INHALATION) at 06:57

## 2023-01-01 RX ADMIN — IPRATROPIUM BROMIDE 0.5 MG: 0.5 SOLUTION RESPIRATORY (INHALATION) at 14:51

## 2023-01-01 RX ADMIN — IPRATROPIUM BROMIDE 0.5 MG: 0.5 SOLUTION RESPIRATORY (INHALATION) at 07:04

## 2023-01-01 RX ADMIN — IPRATROPIUM BROMIDE 0.2 MG: 0.5 SOLUTION RESPIRATORY (INHALATION) at 19:02

## 2023-01-01 RX ADMIN — CHLORHEXIDINE GLUCONATE 1 APPLICATION: 500 CLOTH TOPICAL at 03:23

## 2023-01-01 RX ADMIN — DEXMEDETOMIDINE HYDROCHLORIDE 0.8 MCG/KG/HR: 4 INJECTION, SOLUTION INTRAVENOUS at 04:21

## 2023-01-01 RX ADMIN — SODIUM CHLORIDE 1000 ML/HR: 9 INJECTION, SOLUTION INTRAVENOUS at 11:45

## 2023-01-01 RX ADMIN — INSULIN DETEMIR 10 UNITS: 100 INJECTION, SOLUTION SUBCUTANEOUS at 08:51

## 2023-01-01 RX ADMIN — FUROSEMIDE 40 MG: 10 INJECTION, SOLUTION INTRAMUSCULAR; INTRAVENOUS at 09:44

## 2023-01-01 RX ADMIN — IPRATROPIUM BROMIDE 0.5 MG: 0.5 SOLUTION RESPIRATORY (INHALATION) at 06:10

## 2023-01-01 RX ADMIN — IPRATROPIUM BROMIDE 0.5 MG: 0.5 SOLUTION RESPIRATORY (INHALATION) at 06:48

## 2023-01-01 RX ADMIN — LEVETIRACETAM 500 MG: 500 TABLET, FILM COATED ORAL at 20:03

## 2023-01-01 RX ADMIN — FUROSEMIDE 40 MG: 10 INJECTION, SOLUTION INTRAMUSCULAR; INTRAVENOUS at 08:54

## 2023-01-01 RX ADMIN — SUCRALFATE 1 G: 1 TABLET ORAL at 20:49

## 2023-01-01 RX ADMIN — PANTOPRAZOLE SODIUM 40 MG: 40 TABLET, DELAYED RELEASE ORAL at 05:01

## 2023-01-01 RX ADMIN — Medication 1 TABLET: at 09:41

## 2023-01-01 RX ADMIN — METOCLOPRAMIDE 10 MG: 10 TABLET ORAL at 21:03

## 2023-01-01 RX ADMIN — IPRATROPIUM BROMIDE AND ALBUTEROL SULFATE 3 ML: 2.5; .5 SOLUTION RESPIRATORY (INHALATION) at 19:13

## 2023-01-01 RX ADMIN — DIGOXIN 125 MCG: 0.12 TABLET ORAL at 12:04

## 2023-01-01 RX ADMIN — INSULIN DETEMIR 25 UNITS: 100 INJECTION, SOLUTION SUBCUTANEOUS at 21:22

## 2023-01-01 RX ADMIN — PROPOFOL 5 MCG/KG/MIN: 10 INJECTION, EMULSION INTRAVENOUS at 12:30

## 2023-01-01 RX ADMIN — METHYLNALTREXONE BROMIDE 12 MG: 12 INJECTION, SOLUTION SUBCUTANEOUS at 08:00

## 2023-01-01 RX ADMIN — OXYCODONE HYDROCHLORIDE AND ACETAMINOPHEN 1 TABLET: 7.5; 325 TABLET ORAL at 20:00

## 2023-01-01 RX ADMIN — IPRATROPIUM BROMIDE 0.5 MG: 0.5 SOLUTION RESPIRATORY (INHALATION) at 10:06

## 2023-01-01 RX ADMIN — SODIUM CHLORIDE 4.6 UNITS/HR: 9 INJECTION, SOLUTION INTRAVENOUS at 10:28

## 2023-01-01 RX ADMIN — METHYLPREDNISOLONE SODIUM SUCCINATE 60 MG: 125 INJECTION, POWDER, FOR SOLUTION INTRAMUSCULAR; INTRAVENOUS at 09:43

## 2023-01-01 RX ADMIN — OXYCODONE AND ACETAMINOPHEN 1 TABLET: 325; 10 TABLET ORAL at 19:23

## 2023-01-01 RX ADMIN — Medication 1 APPLICATION: at 21:14

## 2023-01-01 RX ADMIN — METOCLOPRAMIDE 10 MG: 10 TABLET ORAL at 07:37

## 2023-01-01 RX ADMIN — METOPROLOL TARTRATE 50 MG: 50 TABLET, FILM COATED ORAL at 08:10

## 2023-01-01 RX ADMIN — METOPROLOL TARTRATE 50 MG: 50 TABLET, FILM COATED ORAL at 20:53

## 2023-01-01 RX ADMIN — PROPOFOL 5 MCG/KG/MIN: 10 INJECTION, EMULSION INTRAVENOUS at 07:23

## 2023-01-01 RX ADMIN — INSULIN LISPRO 3 UNITS: 100 INJECTION, SOLUTION INTRAVENOUS; SUBCUTANEOUS at 18:17

## 2023-01-01 RX ADMIN — LEVETIRACETAM 500 MG: 500 TABLET, FILM COATED ORAL at 20:56

## 2023-01-01 RX ADMIN — MEROPENEM 1 G: 1 INJECTION, POWDER, FOR SOLUTION INTRAVENOUS at 21:32

## 2023-01-01 RX ADMIN — INSULIN DETEMIR 15 UNITS: 100 INJECTION, SOLUTION SUBCUTANEOUS at 20:31

## 2023-01-01 RX ADMIN — MORPHINE SULFATE 4 MG: 4 INJECTION, SOLUTION INTRAMUSCULAR; INTRAVENOUS at 02:17

## 2023-01-01 RX ADMIN — INSULIN LISPRO 14 UNITS: 100 INJECTION, SOLUTION INTRAVENOUS; SUBCUTANEOUS at 06:51

## 2023-01-01 RX ADMIN — METOCLOPRAMIDE 10 MG: 10 TABLET ORAL at 07:49

## 2023-01-01 RX ADMIN — VANCOMYCIN HYDROCHLORIDE 1000 MG: 1 INJECTION, POWDER, LYOPHILIZED, FOR SOLUTION INTRAVENOUS at 04:25

## 2023-01-01 RX ADMIN — MEROPENEM 1 G: 1 INJECTION, POWDER, FOR SOLUTION INTRAVENOUS at 05:17

## 2023-01-01 RX ADMIN — VANCOMYCIN HYDROCHLORIDE 1250 MG: 10 INJECTION, POWDER, LYOPHILIZED, FOR SOLUTION INTRAVENOUS at 18:06

## 2023-01-01 RX ADMIN — IPRATROPIUM BROMIDE 0.5 MG: 0.5 SOLUTION RESPIRATORY (INHALATION) at 14:02

## 2023-01-01 RX ADMIN — LEVETIRACETAM 500 MG: 500 TABLET, FILM COATED ORAL at 08:11

## 2023-01-01 RX ADMIN — INSULIN LISPRO 16 UNITS: 100 INJECTION, SOLUTION INTRAVENOUS; SUBCUTANEOUS at 08:00

## 2023-01-01 RX ADMIN — METOCLOPRAMIDE 10 MG: 10 TABLET ORAL at 12:06

## 2023-01-01 RX ADMIN — IPRATROPIUM BROMIDE 0.5 MG: 0.5 SOLUTION RESPIRATORY (INHALATION) at 18:53

## 2023-01-01 RX ADMIN — METOCLOPRAMIDE 10 MG: 10 TABLET ORAL at 08:10

## 2023-01-01 RX ADMIN — BUDESONIDE 0.5 MG: 0.5 INHALANT RESPIRATORY (INHALATION) at 19:11

## 2023-01-01 RX ADMIN — DOCUSATE SODIUM 50 MG AND SENNOSIDES 8.6 MG 1 TABLET: 8.6; 5 TABLET, FILM COATED ORAL at 08:23

## 2023-01-01 RX ADMIN — POTASSIUM CHLORIDE 20 MEQ: 10 CAPSULE, COATED, EXTENDED RELEASE ORAL at 09:03

## 2023-01-01 RX ADMIN — MEROPENEM 1 G: 1 INJECTION, POWDER, FOR SOLUTION INTRAVENOUS at 21:57

## 2023-01-01 RX ADMIN — GADOBENATE DIMEGLUMINE 20 ML: 529 INJECTION, SOLUTION INTRAVENOUS at 14:07

## 2023-01-01 RX ADMIN — INSULIN DETEMIR 15 UNITS: 100 INJECTION, SOLUTION SUBCUTANEOUS at 21:21

## 2023-01-01 RX ADMIN — SUCRALFATE 1 G: 1 TABLET ORAL at 07:16

## 2023-01-01 RX ADMIN — METOCLOPRAMIDE 10 MG: 10 TABLET ORAL at 18:27

## 2023-01-01 RX ADMIN — GABAPENTIN 300 MG: 300 CAPSULE ORAL at 20:43

## 2023-01-01 RX ADMIN — METOPROLOL TARTRATE 50 MG: 50 TABLET, FILM COATED ORAL at 08:00

## 2023-01-01 RX ADMIN — VANCOMYCIN HYDROCHLORIDE 1000 MG: 1 INJECTION, POWDER, LYOPHILIZED, FOR SOLUTION INTRAVENOUS at 16:30

## 2023-01-01 RX ADMIN — INSULIN DETEMIR 15 UNITS: 100 INJECTION, SOLUTION SUBCUTANEOUS at 08:19

## 2023-01-01 RX ADMIN — Medication 10 ML: at 10:06

## 2023-01-01 RX ADMIN — METOCLOPRAMIDE 10 MG: 10 TABLET ORAL at 18:12

## 2023-01-01 RX ADMIN — METOPROLOL TARTRATE 50 MG: 50 TABLET, FILM COATED ORAL at 08:06

## 2023-01-01 RX ADMIN — INSULIN LISPRO 12 UNITS: 100 INJECTION, SOLUTION INTRAVENOUS; SUBCUTANEOUS at 11:50

## 2023-01-01 RX ADMIN — METOCLOPRAMIDE 10 MG: 10 TABLET ORAL at 08:11

## 2023-01-01 RX ADMIN — DOCUSATE SODIUM 50 MG AND SENNOSIDES 8.6 MG 1 TABLET: 8.6; 5 TABLET, FILM COATED ORAL at 08:01

## 2023-01-01 RX ADMIN — LEVETIRACETAM 500 MG: 500 TABLET, FILM COATED ORAL at 20:42

## 2023-01-01 RX ADMIN — EPINEPHRINE 1 MG: 0.1 INJECTION INTRACARDIAC; INTRAVENOUS at 18:16

## 2023-01-01 RX ADMIN — IPRATROPIUM BROMIDE 0.5 MG: 0.5 SOLUTION RESPIRATORY (INHALATION) at 09:56

## 2023-01-01 RX ADMIN — INSULIN LISPRO 5 UNITS: 100 INJECTION, SOLUTION INTRAVENOUS; SUBCUTANEOUS at 05:41

## 2023-01-01 RX ADMIN — IPRATROPIUM BROMIDE 0.5 MG: 0.5 SOLUTION RESPIRATORY (INHALATION) at 19:35

## 2023-01-01 RX ADMIN — INSULIN DETEMIR 25 UNITS: 100 INJECTION, SOLUTION SUBCUTANEOUS at 08:04

## 2023-01-01 RX ADMIN — METOPROLOL TARTRATE 50 MG: 50 TABLET, FILM COATED ORAL at 20:50

## 2023-01-01 RX ADMIN — Medication 10 ML: at 09:47

## 2023-01-01 RX ADMIN — DILTIAZEM HYDROCHLORIDE 240 MG: 240 CAPSULE, EXTENDED RELEASE ORAL at 08:59

## 2023-01-01 RX ADMIN — DEXMEDETOMIDINE HYDROCHLORIDE 0.4 MCG/KG/HR: 4 INJECTION, SOLUTION INTRAVENOUS at 09:41

## 2023-01-01 RX ADMIN — MEROPENEM 1 G: 1 INJECTION, POWDER, FOR SOLUTION INTRAVENOUS at 21:09

## 2023-01-01 RX ADMIN — IPRATROPIUM BROMIDE 0.5 MG: 0.5 SOLUTION RESPIRATORY (INHALATION) at 13:35

## 2023-01-01 RX ADMIN — SODIUM CHLORIDE, POTASSIUM CHLORIDE, SODIUM LACTATE AND CALCIUM CHLORIDE 100 ML/HR: 600; 310; 30; 20 INJECTION, SOLUTION INTRAVENOUS at 03:59

## 2023-01-01 RX ADMIN — GABAPENTIN 300 MG: 250 SOLUTION ORAL at 15:07

## 2023-01-01 RX ADMIN — GABAPENTIN 300 MG: 300 CAPSULE ORAL at 19:50

## 2023-01-01 RX ADMIN — Medication 10 ML: at 20:02

## 2023-01-01 RX ADMIN — ASPIRIN 81 MG: 81 TABLET, CHEWABLE ORAL at 08:01

## 2023-01-01 RX ADMIN — METHYLNALTREXONE BROMIDE 12 MG: 12 INJECTION, SOLUTION SUBCUTANEOUS at 08:05

## 2023-01-01 RX ADMIN — ATORVASTATIN CALCIUM 20 MG: 10 TABLET, FILM COATED ORAL at 21:03

## 2023-01-01 RX ADMIN — IPRATROPIUM BROMIDE 0.5 MG: 0.5 SOLUTION RESPIRATORY (INHALATION) at 14:00

## 2023-01-01 RX ADMIN — DEXTROSE MONOHYDRATE 25 G: 25 INJECTION, SOLUTION INTRAVENOUS at 21:21

## 2023-01-01 RX ADMIN — DILTIAZEM HYDROCHLORIDE 90 MG: 90 TABLET, FILM COATED ORAL at 05:02

## 2023-01-01 RX ADMIN — SUCRALFATE 1 G: 1 TABLET ORAL at 16:58

## 2023-01-01 RX ADMIN — INSULIN LISPRO 3 UNITS: 100 INJECTION, SOLUTION INTRAVENOUS; SUBCUTANEOUS at 17:12

## 2023-01-01 RX ADMIN — ASPIRIN 81 MG: 81 TABLET, CHEWABLE ORAL at 08:05

## 2023-01-01 RX ADMIN — CHLORHEXIDINE GLUCONATE 1 APPLICATION: 500 CLOTH TOPICAL at 05:19

## 2023-01-01 RX ADMIN — PROPOFOL 20 MCG/KG/MIN: 10 INJECTION, EMULSION INTRAVENOUS at 01:44

## 2023-01-01 RX ADMIN — SUCRALFATE 1 G: 1 TABLET ORAL at 11:32

## 2023-01-01 RX ADMIN — PROPOFOL 40 MCG/KG/MIN: 10 INJECTION, EMULSION INTRAVENOUS at 21:59

## 2023-01-01 RX ADMIN — INSULIN LISPRO 3 UNITS: 100 INJECTION, SOLUTION INTRAVENOUS; SUBCUTANEOUS at 17:20

## 2023-01-01 RX ADMIN — Medication 1 TABLET: at 18:01

## 2023-01-01 RX ADMIN — METOPROLOL TARTRATE 50 MG: 50 TABLET, FILM COATED ORAL at 21:10

## 2023-01-01 RX ADMIN — IPRATROPIUM BROMIDE 0.5 MG: 0.5 SOLUTION RESPIRATORY (INHALATION) at 05:39

## 2023-01-01 RX ADMIN — INSULIN DETEMIR 20 UNITS: 100 INJECTION, SOLUTION SUBCUTANEOUS at 21:21

## 2023-01-01 RX ADMIN — Medication 1 TABLET: at 08:06

## 2023-01-01 RX ADMIN — BUDESONIDE 0.5 MG: 0.5 INHALANT RESPIRATORY (INHALATION) at 18:49

## 2023-01-01 RX ADMIN — ASPIRIN 81 MG: 81 TABLET, CHEWABLE ORAL at 08:25

## 2023-01-01 RX ADMIN — ATORVASTATIN CALCIUM 20 MG: 10 TABLET, FILM COATED ORAL at 20:26

## 2023-01-01 RX ADMIN — IPRATROPIUM BROMIDE 0.5 MG: 0.5 SOLUTION RESPIRATORY (INHALATION) at 14:06

## 2023-01-01 RX ADMIN — BUDESONIDE AND FORMOTEROL FUMARATE DIHYDRATE 2 PUFF: 160; 4.5 AEROSOL RESPIRATORY (INHALATION) at 06:31

## 2023-01-01 RX ADMIN — MEROPENEM 1 G: 1 INJECTION, POWDER, FOR SOLUTION INTRAVENOUS at 21:01

## 2023-01-01 RX ADMIN — METOCLOPRAMIDE 10 MG: 10 TABLET ORAL at 11:08

## 2023-01-01 RX ADMIN — INSULIN DETEMIR 25 UNITS: 100 INJECTION, SOLUTION SUBCUTANEOUS at 20:29

## 2023-01-01 RX ADMIN — LEVETIRACETAM 500 MG: 500 TABLET, FILM COATED ORAL at 20:26

## 2023-01-01 RX ADMIN — BUDESONIDE AND FORMOTEROL FUMARATE DIHYDRATE 2 PUFF: 160; 4.5 AEROSOL RESPIRATORY (INHALATION) at 18:55

## 2023-01-01 RX ADMIN — Medication 10 ML: at 09:59

## 2023-01-01 RX ADMIN — IPRATROPIUM BROMIDE 0.5 MG: 0.5 SOLUTION RESPIRATORY (INHALATION) at 11:05

## 2023-01-01 RX ADMIN — INSULIN DETEMIR 20 UNITS: 100 INJECTION, SOLUTION SUBCUTANEOUS at 23:04

## 2023-01-01 RX ADMIN — INSULIN LISPRO 5 UNITS: 100 INJECTION, SOLUTION INTRAVENOUS; SUBCUTANEOUS at 12:14

## 2023-01-01 RX ADMIN — DEXMEDETOMIDINE HYDROCHLORIDE 0.8 MCG/KG/HR: 4 INJECTION, SOLUTION INTRAVENOUS at 04:59

## 2023-01-01 RX ADMIN — ATORVASTATIN CALCIUM 20 MG: 10 TABLET, FILM COATED ORAL at 20:01

## 2023-01-01 RX ADMIN — DEXTROSE MONOHYDRATE 25 G: 25 INJECTION, SOLUTION INTRAVENOUS at 20:49

## 2023-01-01 RX ADMIN — BUDESONIDE 0.5 MG: 0.5 INHALANT RESPIRATORY (INHALATION) at 06:00

## 2023-01-01 RX ADMIN — ENOXAPARIN SODIUM 120 MG: 150 INJECTION SUBCUTANEOUS at 08:05

## 2023-01-01 RX ADMIN — INSULIN DETEMIR 10 UNITS: 100 INJECTION, SOLUTION SUBCUTANEOUS at 22:57

## 2023-01-01 RX ADMIN — DILTIAZEM HYDROCHLORIDE 90 MG: 90 TABLET, FILM COATED ORAL at 05:59

## 2023-01-01 RX ADMIN — Medication 1 TABLET: at 08:25

## 2023-01-01 RX ADMIN — TAZOBACTAM SODIUM AND PIPERACILLIN SODIUM 3.38 G: 375; 3 INJECTION, SOLUTION INTRAVENOUS at 19:38

## 2023-01-01 RX ADMIN — DOCUSATE SODIUM 50 MG AND SENNOSIDES 8.6 MG 1 TABLET: 8.6; 5 TABLET, FILM COATED ORAL at 09:46

## 2023-01-01 RX ADMIN — DOCUSATE SODIUM 50 MG AND SENNOSIDES 8.6 MG 1 TABLET: 8.6; 5 TABLET, FILM COATED ORAL at 08:11

## 2023-01-01 RX ADMIN — LEVETIRACETAM 500 MG: 500 TABLET, FILM COATED ORAL at 20:45

## 2023-01-01 RX ADMIN — LEVETIRACETAM 500 MG: 500 TABLET, FILM COATED ORAL at 20:52

## 2023-01-01 RX ADMIN — PROPOFOL 50 MCG/KG/MIN: 10 INJECTION, EMULSION INTRAVENOUS at 04:12

## 2023-01-01 RX ADMIN — DILTIAZEM HYDROCHLORIDE 90 MG: 90 TABLET, FILM COATED ORAL at 21:28

## 2023-01-01 RX ADMIN — MEROPENEM 1 G: 1 INJECTION, POWDER, FOR SOLUTION INTRAVENOUS at 14:30

## 2023-01-01 RX ADMIN — IPRATROPIUM BROMIDE 0.5 MG: 0.5 SOLUTION RESPIRATORY (INHALATION) at 18:23

## 2023-01-01 RX ADMIN — ASPIRIN 81 MG: 81 TABLET, CHEWABLE ORAL at 09:41

## 2023-01-01 RX ADMIN — Medication 1 TABLET: at 08:43

## 2023-01-01 RX ADMIN — FUROSEMIDE 40 MG: 10 INJECTION, SOLUTION INTRAVENOUS at 10:55

## 2023-01-01 RX ADMIN — METOPROLOL TARTRATE 50 MG: 50 TABLET, FILM COATED ORAL at 08:26

## 2023-01-01 RX ADMIN — MORPHINE SULFATE 5 MG: 4 INJECTION, SOLUTION INTRAMUSCULAR; INTRAVENOUS at 20:50

## 2023-01-01 RX ADMIN — INSULIN LISPRO 3 UNITS: 100 INJECTION, SOLUTION INTRAVENOUS; SUBCUTANEOUS at 11:40

## 2023-01-01 RX ADMIN — IPRATROPIUM BROMIDE 0.5 MG: 0.5 SOLUTION RESPIRATORY (INHALATION) at 18:33

## 2023-01-01 RX ADMIN — TAZOBACTAM SODIUM AND PIPERACILLIN SODIUM 4.5 G: 500; 4 INJECTION, SOLUTION INTRAVENOUS at 08:17

## 2023-01-01 RX ADMIN — DOCUSATE SODIUM 50 MG AND SENNOSIDES 8.6 MG 1 TABLET: 8.6; 5 TABLET, FILM COATED ORAL at 08:00

## 2023-01-01 RX ADMIN — Medication 10 ML: at 09:19

## 2023-01-01 RX ADMIN — Medication 1 TABLET: at 08:00

## 2023-01-01 RX ADMIN — SUCRALFATE 1 G: 1 TABLET ORAL at 20:18

## 2023-01-01 RX ADMIN — INSULIN DETEMIR 30 UNITS: 100 INJECTION, SOLUTION SUBCUTANEOUS at 21:54

## 2023-01-01 RX ADMIN — ENOXAPARIN SODIUM 120 MG: 150 INJECTION SUBCUTANEOUS at 08:59

## 2023-01-01 RX ADMIN — SUCRALFATE 1 G: 1 TABLET ORAL at 17:48

## 2023-01-01 RX ADMIN — MEROPENEM 1 G: 1 INJECTION, POWDER, FOR SOLUTION INTRAVENOUS at 05:01

## 2023-01-01 RX ADMIN — PANTOPRAZOLE SODIUM 40 MG: 40 TABLET, DELAYED RELEASE ORAL at 05:14

## 2023-01-01 RX ADMIN — DIGOXIN 250 MCG: 250 TABLET ORAL at 12:04

## 2023-01-01 RX ADMIN — IPRATROPIUM BROMIDE 0.5 MG: 0.5 SOLUTION RESPIRATORY (INHALATION) at 19:07

## 2023-01-01 RX ADMIN — IPRATROPIUM BROMIDE 0.5 MG: 0.5 SOLUTION RESPIRATORY (INHALATION) at 10:01

## 2023-01-01 RX ADMIN — DIGOXIN 250 MCG: 250 TABLET ORAL at 12:22

## 2023-01-01 RX ADMIN — IPRATROPIUM BROMIDE 0.5 MG: 0.5 SOLUTION RESPIRATORY (INHALATION) at 19:06

## 2023-01-01 RX ADMIN — INSULIN LISPRO 10 UNITS: 100 INJECTION, SOLUTION INTRAVENOUS; SUBCUTANEOUS at 18:17

## 2023-01-01 RX ADMIN — SUCRALFATE 1 G: 1 TABLET ORAL at 07:50

## 2023-01-01 RX ADMIN — BUDESONIDE 0.5 MG: 0.5 INHALANT RESPIRATORY (INHALATION) at 05:54

## 2023-01-01 RX ADMIN — DILTIAZEM HYDROCHLORIDE 90 MG: 90 TABLET, FILM COATED ORAL at 21:21

## 2023-01-01 RX ADMIN — IPRATROPIUM BROMIDE 0.5 MG: 0.5 SOLUTION RESPIRATORY (INHALATION) at 10:16

## 2023-01-01 RX ADMIN — GABAPENTIN 300 MG: 300 CAPSULE ORAL at 15:27

## 2023-01-01 RX ADMIN — METOPROLOL TARTRATE 50 MG: 50 TABLET, FILM COATED ORAL at 20:30

## 2023-01-01 RX ADMIN — OXYCODONE HYDROCHLORIDE AND ACETAMINOPHEN 1 TABLET: 10; 325 TABLET ORAL at 13:49

## 2023-01-01 RX ADMIN — LEVETIRACETAM 500 MG: 500 TABLET, FILM COATED ORAL at 19:51

## 2023-01-01 RX ADMIN — BUDESONIDE AND FORMOTEROL FUMARATE DIHYDRATE 2 PUFF: 160; 4.5 AEROSOL RESPIRATORY (INHALATION) at 06:41

## 2023-01-01 RX ADMIN — CHLORHEXIDINE GLUCONATE 1 APPLICATION: 500 CLOTH TOPICAL at 04:27

## 2023-01-01 RX ADMIN — FUROSEMIDE 40 MG: 10 INJECTION, SOLUTION INTRAMUSCULAR; INTRAVENOUS at 18:12

## 2023-01-01 RX ADMIN — METOCLOPRAMIDE 10 MG: 10 TABLET ORAL at 20:25

## 2023-01-01 RX ADMIN — POLYETHYLENE GLYCOL 3350 17 G: 17 POWDER, FOR SOLUTION ORAL at 09:59

## 2023-01-01 RX ADMIN — PANTOPRAZOLE SODIUM 40 MG: 40 INJECTION, POWDER, FOR SOLUTION INTRAVENOUS at 06:37

## 2023-01-01 RX ADMIN — SODIUM BICARBONATE 50 MEQ: 84 INJECTION INTRAVENOUS at 18:18

## 2023-01-01 RX ADMIN — Medication 1750 MG: at 11:50

## 2023-01-01 RX ADMIN — TAZOBACTAM SODIUM AND PIPERACILLIN SODIUM 3.38 G: 375; 3 INJECTION, SOLUTION INTRAVENOUS at 08:01

## 2023-01-01 RX ADMIN — OXYCODONE HYDROCHLORIDE AND ACETAMINOPHEN 1 TABLET: 10; 325 TABLET ORAL at 20:22

## 2023-01-01 RX ADMIN — DIGOXIN 250 MCG: 250 TABLET ORAL at 13:44

## 2023-01-01 RX ADMIN — PROPOFOL 15 MCG/KG/MIN: 10 INJECTION, EMULSION INTRAVENOUS at 04:24

## 2023-01-01 RX ADMIN — GABAPENTIN 300 MG: 250 SOLUTION ORAL at 21:04

## 2023-01-01 RX ADMIN — FUROSEMIDE 40 MG: 40 TABLET ORAL at 08:58

## 2023-01-01 RX ADMIN — IPRATROPIUM BROMIDE 0.5 MG: 0.5 SOLUTION RESPIRATORY (INHALATION) at 14:01

## 2023-01-01 RX ADMIN — POLYETHYLENE GLYCOL 3350 17 G: 17 POWDER, FOR SOLUTION ORAL at 08:59

## 2023-01-01 RX ADMIN — MEROPENEM 1 G: 1 INJECTION, POWDER, FOR SOLUTION INTRAVENOUS at 14:33

## 2023-01-01 RX ADMIN — OXYCODONE AND ACETAMINOPHEN 1 TABLET: 325; 10 TABLET ORAL at 17:27

## 2023-01-01 RX ADMIN — METOCLOPRAMIDE 10 MG: 10 TABLET ORAL at 12:14

## 2023-01-01 RX ADMIN — INSULIN DETEMIR 25 UNITS: 100 INJECTION, SOLUTION SUBCUTANEOUS at 13:26

## 2023-01-01 RX ADMIN — GABAPENTIN 300 MG: 300 CAPSULE ORAL at 21:06

## 2023-01-01 RX ADMIN — HEPARIN SODIUM 5000 UNITS: 5000 INJECTION, SOLUTION INTRAVENOUS; SUBCUTANEOUS at 08:54

## 2023-01-01 RX ADMIN — IPRATROPIUM BROMIDE 0.5 MG: 0.5 SOLUTION RESPIRATORY (INHALATION) at 14:19

## 2023-01-01 RX ADMIN — CHLORHEXIDINE GLUCONATE 1 APPLICATION: 500 CLOTH TOPICAL at 04:44

## 2023-01-01 RX ADMIN — SUCRALFATE 1 G: 1 TABLET ORAL at 21:03

## 2023-01-01 RX ADMIN — Medication 10 ML: at 08:53

## 2023-01-01 RX ADMIN — DOCUSATE SODIUM 50 MG AND SENNOSIDES 8.6 MG 1 TABLET: 8.6; 5 TABLET, FILM COATED ORAL at 08:43

## 2023-01-01 RX ADMIN — Medication 10 ML: at 08:10

## 2023-01-01 RX ADMIN — DOCUSATE SODIUM 50 MG AND SENNOSIDES 8.6 MG 1 TABLET: 8.6; 5 TABLET, FILM COATED ORAL at 09:41

## 2023-01-01 RX ADMIN — OXYCODONE HYDROCHLORIDE AND ACETAMINOPHEN 1 TABLET: 10; 325 TABLET ORAL at 02:01

## 2023-01-01 RX ADMIN — LEVOFLOXACIN 500 MG: 500 TABLET, FILM COATED ORAL at 08:10

## 2023-01-01 RX ADMIN — OXYCODONE HYDROCHLORIDE AND ACETAMINOPHEN 1 TABLET: 10; 325 TABLET ORAL at 17:24

## 2023-01-01 RX ADMIN — GABAPENTIN 300 MG: 300 CAPSULE ORAL at 16:46

## 2023-01-01 RX ADMIN — METOCLOPRAMIDE 10 MG: 10 TABLET ORAL at 17:18

## 2023-01-01 RX ADMIN — BUDESONIDE AND FORMOTEROL FUMARATE DIHYDRATE 2 PUFF: 160; 4.5 AEROSOL RESPIRATORY (INHALATION) at 18:56

## 2023-01-01 RX ADMIN — BUDESONIDE 0.5 MG: 0.5 INHALANT RESPIRATORY (INHALATION) at 18:14

## 2023-01-01 RX ADMIN — EPINEPHRINE 1 MG: 0.1 INJECTION INTRACARDIAC; INTRAVENOUS at 18:13

## 2023-01-01 RX ADMIN — IPRATROPIUM BROMIDE 0.5 MG: 0.5 SOLUTION RESPIRATORY (INHALATION) at 09:44

## 2023-01-01 RX ADMIN — INSULIN DETEMIR 15 UNITS: 100 INJECTION, SOLUTION SUBCUTANEOUS at 12:33

## 2023-01-01 RX ADMIN — GABAPENTIN 300 MG: 300 CAPSULE ORAL at 20:44

## 2023-01-01 RX ADMIN — ENOXAPARIN SODIUM 120 MG: 150 INJECTION SUBCUTANEOUS at 08:13

## 2023-01-01 RX ADMIN — POTASSIUM CHLORIDE 20 MEQ: 10 CAPSULE, COATED, EXTENDED RELEASE ORAL at 09:49

## 2023-01-01 RX ADMIN — IPRATROPIUM BROMIDE 0.5 MG: 0.5 SOLUTION RESPIRATORY (INHALATION) at 07:17

## 2023-01-01 RX ADMIN — INSULIN LISPRO 3 UNITS: 100 INJECTION, SOLUTION INTRAVENOUS; SUBCUTANEOUS at 11:18

## 2023-01-01 RX ADMIN — ASPIRIN 81 MG: 81 TABLET, CHEWABLE ORAL at 08:00

## 2023-01-01 RX ADMIN — IPRATROPIUM BROMIDE 0.5 MG: 0.5 SOLUTION RESPIRATORY (INHALATION) at 10:42

## 2023-01-01 RX ADMIN — OXYCODONE HYDROCHLORIDE AND ACETAMINOPHEN 1 TABLET: 10; 325 TABLET ORAL at 00:46

## 2023-01-01 RX ADMIN — DIGOXIN 250 MCG: 250 TABLET ORAL at 12:07

## 2023-01-01 RX ADMIN — GABAPENTIN 300 MG: 250 SOLUTION ORAL at 06:30

## 2023-01-01 RX ADMIN — DILTIAZEM HYDROCHLORIDE 240 MG: 240 CAPSULE, EXTENDED RELEASE ORAL at 08:11

## 2023-01-01 RX ADMIN — DILTIAZEM HYDROCHLORIDE 90 MG: 90 TABLET, FILM COATED ORAL at 05:41

## 2023-01-01 RX ADMIN — SUCRALFATE 1 G: 1 TABLET ORAL at 12:07

## 2023-01-01 RX ADMIN — GABAPENTIN 300 MG: 300 CAPSULE ORAL at 08:59

## 2023-01-01 RX ADMIN — ENOXAPARIN SODIUM 120 MG: 150 INJECTION SUBCUTANEOUS at 08:04

## 2023-01-01 RX ADMIN — IPRATROPIUM BROMIDE 0.5 MG: 0.5 SOLUTION RESPIRATORY (INHALATION) at 14:36

## 2023-01-01 RX ADMIN — POTASSIUM CHLORIDE 20 MEQ: 10 CAPSULE, COATED, EXTENDED RELEASE ORAL at 09:00

## 2023-01-01 RX ADMIN — INSULIN DETEMIR 25 UNITS: 100 INJECTION, SOLUTION SUBCUTANEOUS at 21:16

## 2023-01-01 RX ADMIN — DEXMEDETOMIDINE HYDROCHLORIDE 0.6 MCG/KG/HR: 4 INJECTION, SOLUTION INTRAVENOUS at 23:43

## 2023-01-01 RX ADMIN — IPRATROPIUM BROMIDE AND ALBUTEROL SULFATE 3 ML: 2.5; .5 SOLUTION RESPIRATORY (INHALATION) at 10:21

## 2023-01-01 RX ADMIN — LEVETIRACETAM 500 MG: 500 TABLET, FILM COATED ORAL at 20:34

## 2023-01-01 RX ADMIN — IPRATROPIUM BROMIDE 0.5 MG: 0.5 SOLUTION RESPIRATORY (INHALATION) at 18:49

## 2023-01-01 RX ADMIN — Medication 10 ML: at 08:25

## 2023-01-01 RX ADMIN — GABAPENTIN 300 MG: 300 CAPSULE ORAL at 09:02

## 2023-01-01 RX ADMIN — DEXMEDETOMIDINE HYDROCHLORIDE 0.6 MCG/KG/HR: 4 INJECTION, SOLUTION INTRAVENOUS at 03:32

## 2023-01-01 RX ADMIN — IPRATROPIUM BROMIDE 0.5 MG: 0.5 SOLUTION RESPIRATORY (INHALATION) at 19:11

## 2023-01-01 RX ADMIN — ENOXAPARIN SODIUM 120 MG: 150 INJECTION SUBCUTANEOUS at 09:26

## 2023-01-01 RX ADMIN — Medication 10 ML: at 21:07

## 2023-01-01 RX ADMIN — PROPOFOL 20 MCG/KG/MIN: 10 INJECTION, EMULSION INTRAVENOUS at 06:29

## 2023-01-01 RX ADMIN — OXYCODONE HYDROCHLORIDE AND ACETAMINOPHEN 1 TABLET: 10; 325 TABLET ORAL at 14:49

## 2023-01-01 RX ADMIN — FUROSEMIDE 40 MG: 10 INJECTION, SOLUTION INTRAMUSCULAR; INTRAVENOUS at 18:17

## 2023-01-01 RX ADMIN — METOPROLOL TARTRATE 50 MG: 50 TABLET, FILM COATED ORAL at 08:03

## 2023-01-01 RX ADMIN — PANTOPRAZOLE SODIUM 40 MG: 40 TABLET, DELAYED RELEASE ORAL at 06:55

## 2023-01-01 RX ADMIN — METOPROLOL TARTRATE 50 MG: 50 TABLET, FILM COATED ORAL at 20:43

## 2023-01-01 RX ADMIN — IPRATROPIUM BROMIDE 0.5 MG: 0.5 SOLUTION RESPIRATORY (INHALATION) at 20:30

## 2023-01-01 RX ADMIN — ATORVASTATIN CALCIUM 20 MG: 10 TABLET, FILM COATED ORAL at 20:56

## 2023-01-01 RX ADMIN — FUROSEMIDE 40 MG: 10 INJECTION, SOLUTION INTRAVENOUS at 09:26

## 2023-01-01 RX ADMIN — Medication 10 ML: at 08:01

## 2023-01-01 RX ADMIN — Medication 10 ML: at 08:09

## 2023-01-01 RX ADMIN — SUCRALFATE 1 G: 1 TABLET ORAL at 07:49

## 2023-01-01 RX ADMIN — INSULIN LISPRO 3 UNITS: 100 INJECTION, SOLUTION INTRAVENOUS; SUBCUTANEOUS at 11:55

## 2023-01-01 RX ADMIN — AMIODARONE HYDROCHLORIDE 1 MG/MIN: 1.8 INJECTION, SOLUTION INTRAVENOUS at 13:31

## 2023-01-01 RX ADMIN — DILTIAZEM HYDROCHLORIDE 300 MG: 300 CAPSULE, COATED, EXTENDED RELEASE ORAL at 08:59

## 2023-01-01 RX ADMIN — LEVETIRACETAM 500 MG: 500 TABLET, FILM COATED ORAL at 08:03

## 2023-01-01 RX ADMIN — BUTALBITAL, ACETAMINOPHEN, AND CAFFEINE 1 TABLET: 50; 325; 40 TABLET ORAL at 06:14

## 2023-01-01 RX ADMIN — SODIUM CHLORIDE, POTASSIUM CHLORIDE, SODIUM LACTATE AND CALCIUM CHLORIDE 100 ML/HR: 600; 310; 30; 20 INJECTION, SOLUTION INTRAVENOUS at 21:43

## 2023-01-01 RX ADMIN — INSULIN LISPRO 12 UNITS: 100 INJECTION, SOLUTION INTRAVENOUS; SUBCUTANEOUS at 13:29

## 2023-01-01 RX ADMIN — ENOXAPARIN SODIUM 120 MG: 150 INJECTION SUBCUTANEOUS at 21:55

## 2023-01-01 RX ADMIN — INSULIN DETEMIR 10 UNITS: 100 INJECTION, SOLUTION SUBCUTANEOUS at 09:25

## 2023-01-01 RX ADMIN — ACETAMINOPHEN 650 MG: 325 TABLET ORAL at 00:02

## 2023-01-01 RX ADMIN — METOCLOPRAMIDE 10 MG: 10 TABLET ORAL at 07:56

## 2023-01-01 RX ADMIN — CALCIUM CARBONATE 2 TABLET: 500 TABLET, CHEWABLE ORAL at 12:07

## 2023-01-01 RX ADMIN — PROPOFOL 50 MCG/KG/MIN: 10 INJECTION, EMULSION INTRAVENOUS at 22:12

## 2023-01-01 RX ADMIN — BUDESONIDE 0.5 MG: 0.5 INHALANT RESPIRATORY (INHALATION) at 18:29

## 2023-01-01 RX ADMIN — INSULIN DETEMIR 25 UNITS: 100 INJECTION, SOLUTION SUBCUTANEOUS at 20:56

## 2023-01-01 RX ADMIN — MORPHINE SULFATE 2 MG: 2 INJECTION, SOLUTION INTRAMUSCULAR; INTRAVENOUS at 18:03

## 2023-01-01 RX ADMIN — ATORVASTATIN CALCIUM 20 MG: 10 TABLET, FILM COATED ORAL at 20:43

## 2023-01-01 RX ADMIN — MEROPENEM 1 G: 1 INJECTION, POWDER, FOR SOLUTION INTRAVENOUS at 21:33

## 2023-01-01 RX ADMIN — IPRATROPIUM BROMIDE 0.5 MG: 0.5 SOLUTION RESPIRATORY (INHALATION) at 06:40

## 2023-01-01 RX ADMIN — MEROPENEM 1 G: 1 INJECTION, POWDER, FOR SOLUTION INTRAVENOUS at 13:44

## 2023-01-01 RX ADMIN — PANTOPRAZOLE SODIUM 40 MG: 40 INJECTION, POWDER, FOR SOLUTION INTRAVENOUS at 07:02

## 2023-01-01 RX ADMIN — SUCRALFATE 1 G: 1 TABLET ORAL at 07:56

## 2023-01-01 RX ADMIN — FUROSEMIDE 40 MG: 10 INJECTION, SOLUTION INTRAMUSCULAR; INTRAVENOUS at 17:32

## 2023-01-01 RX ADMIN — METOCLOPRAMIDE 10 MG: 10 TABLET ORAL at 21:43

## 2023-01-01 RX ADMIN — INSULIN DETEMIR 25 UNITS: 100 INJECTION, SOLUTION SUBCUTANEOUS at 09:49

## 2023-01-01 RX ADMIN — AMIODARONE HYDROCHLORIDE 1 MG/MIN: 1.8 INJECTION, SOLUTION INTRAVENOUS at 08:15

## 2023-01-01 RX ADMIN — MEROPENEM 1 G: 1 INJECTION, POWDER, FOR SOLUTION INTRAVENOUS at 13:49

## 2023-01-01 RX ADMIN — GABAPENTIN 300 MG: 300 CAPSULE ORAL at 15:42

## 2023-01-01 RX ADMIN — PROPOFOL 20 MCG/KG/MIN: 10 INJECTION, EMULSION INTRAVENOUS at 12:49

## 2023-01-01 RX ADMIN — BUDESONIDE 0.5 MG: 0.5 INHALANT RESPIRATORY (INHALATION) at 05:45

## 2023-01-01 RX ADMIN — FUROSEMIDE 40 MG: 10 INJECTION, SOLUTION INTRAVENOUS at 09:46

## 2023-01-01 RX ADMIN — GABAPENTIN 300 MG: 300 CAPSULE ORAL at 15:14

## 2023-01-01 RX ADMIN — POLYETHYLENE GLYCOL 3350 17 G: 17 POWDER, FOR SOLUTION ORAL at 08:11

## 2023-01-01 RX ADMIN — MEROPENEM 1 G: 1 INJECTION, POWDER, FOR SOLUTION INTRAVENOUS at 05:51

## 2023-01-01 RX ADMIN — METOPROLOL TARTRATE 50 MG: 50 TABLET, FILM COATED ORAL at 20:18

## 2023-01-01 RX ADMIN — METHYLNALTREXONE BROMIDE 12 MG: 12 INJECTION, SOLUTION SUBCUTANEOUS at 10:19

## 2023-01-01 RX ADMIN — SUCRALFATE 1 G: 1 TABLET ORAL at 21:10

## 2023-01-01 RX ADMIN — Medication 10 ML: at 20:40

## 2023-01-01 RX ADMIN — METOCLOPRAMIDE 10 MG: 10 TABLET ORAL at 11:21

## 2023-01-01 RX ADMIN — MEROPENEM 1 G: 1 INJECTION, POWDER, FOR SOLUTION INTRAVENOUS at 22:10

## 2023-01-01 RX ADMIN — MEROPENEM 1 G: 1 INJECTION, POWDER, FOR SOLUTION INTRAVENOUS at 05:46

## 2023-01-01 RX ADMIN — BUDESONIDE 0.5 MG: 0.5 INHALANT RESPIRATORY (INHALATION) at 06:40

## 2023-01-01 RX ADMIN — METOCLOPRAMIDE 10 MG: 10 TABLET ORAL at 16:55

## 2023-01-01 RX ADMIN — LEVOFLOXACIN 500 MG: 500 TABLET, FILM COATED ORAL at 09:41

## 2023-01-01 RX ADMIN — INSULIN LISPRO 8 UNITS: 100 INJECTION, SOLUTION INTRAVENOUS; SUBCUTANEOUS at 08:18

## 2023-01-01 RX ADMIN — METHYLNALTREXONE BROMIDE 12 MG: 12 INJECTION, SOLUTION SUBCUTANEOUS at 08:09

## 2023-01-01 RX ADMIN — IPRATROPIUM BROMIDE 0.5 MG: 0.5 SOLUTION RESPIRATORY (INHALATION) at 14:28

## 2023-01-01 RX ADMIN — LEVETIRACETAM 500 MG: 500 TABLET, FILM COATED ORAL at 08:13

## 2023-01-01 RX ADMIN — IPRATROPIUM BROMIDE 0.5 MG: 0.5 SOLUTION RESPIRATORY (INHALATION) at 19:30

## 2023-01-01 RX ADMIN — DOCUSATE SODIUM 50 MG AND SENNOSIDES 8.6 MG 1 TABLET: 8.6; 5 TABLET, FILM COATED ORAL at 08:10

## 2023-01-01 RX ADMIN — IPRATROPIUM BROMIDE 0.5 MG: 0.5 SOLUTION RESPIRATORY (INHALATION) at 06:38

## 2023-01-01 RX ADMIN — METHYLPREDNISOLONE SODIUM SUCCINATE 60 MG: 125 INJECTION, POWDER, FOR SOLUTION INTRAMUSCULAR; INTRAVENOUS at 05:38

## 2023-01-01 RX ADMIN — Medication 10 ML: at 20:35

## 2023-01-01 RX ADMIN — TAZOBACTAM SODIUM AND PIPERACILLIN SODIUM 3.38 G: 375; 3 INJECTION, SOLUTION INTRAVENOUS at 23:50

## 2023-01-01 RX ADMIN — ATORVASTATIN CALCIUM 20 MG: 10 TABLET, FILM COATED ORAL at 21:13

## 2023-01-01 RX ADMIN — METHYLPREDNISOLONE SODIUM SUCCINATE 60 MG: 125 INJECTION, POWDER, FOR SOLUTION INTRAMUSCULAR; INTRAVENOUS at 23:44

## 2023-01-01 RX ADMIN — DIGOXIN 250 MCG: 0.25 INJECTION INTRAMUSCULAR; INTRAVENOUS at 04:51

## 2023-01-01 RX ADMIN — BUDESONIDE 0.5 MG: 0.5 INHALANT RESPIRATORY (INHALATION) at 18:17

## 2023-01-01 RX ADMIN — PROPOFOL 50 MCG/KG/MIN: 10 INJECTION, EMULSION INTRAVENOUS at 06:25

## 2023-01-01 RX ADMIN — LINEZOLID 600 MG: 600 INJECTION, SOLUTION INTRAVENOUS at 21:40

## 2023-01-01 RX ADMIN — BUTALBITAL, ACETAMINOPHEN, AND CAFFEINE 1 TABLET: 50; 325; 40 TABLET ORAL at 15:27

## 2023-01-01 RX ADMIN — IPRATROPIUM BROMIDE 0.5 MG: 0.5 SOLUTION RESPIRATORY (INHALATION) at 06:13

## 2023-01-01 RX ADMIN — SUCRALFATE 1 G: 1 TABLET ORAL at 12:51

## 2023-01-01 RX ADMIN — Medication 10 ML: at 11:46

## 2023-01-01 RX ADMIN — SUCRALFATE 1 G: 1 TABLET ORAL at 20:42

## 2023-01-01 RX ADMIN — AMLODIPINE BESYLATE 10 MG: 10 TABLET ORAL at 09:00

## 2023-01-01 RX ADMIN — METOCLOPRAMIDE 10 MG: 10 TABLET ORAL at 11:20

## 2023-01-01 RX ADMIN — IPRATROPIUM BROMIDE 0.5 MG: 0.5 SOLUTION RESPIRATORY (INHALATION) at 10:35

## 2023-01-01 RX ADMIN — LEVETIRACETAM 500 MG: 500 TABLET, FILM COATED ORAL at 08:43

## 2023-01-01 RX ADMIN — INSULIN DETEMIR 10 UNITS: 100 INJECTION, SOLUTION SUBCUTANEOUS at 08:12

## 2023-01-01 RX ADMIN — ASPIRIN 81 MG: 81 TABLET, CHEWABLE ORAL at 09:58

## 2023-01-01 RX ADMIN — PANTOPRAZOLE SODIUM 40 MG: 40 INJECTION, POWDER, FOR SOLUTION INTRAVENOUS at 06:00

## 2023-01-01 RX ADMIN — Medication 10 ML: at 20:43

## 2023-01-01 RX ADMIN — ENOXAPARIN SODIUM 120 MG: 150 INJECTION SUBCUTANEOUS at 20:06

## 2023-01-01 RX ADMIN — INSULIN DETEMIR 25 UNITS: 100 INJECTION, SOLUTION SUBCUTANEOUS at 09:44

## 2023-01-01 RX ADMIN — MEROPENEM 1 G: 1 INJECTION, POWDER, FOR SOLUTION INTRAVENOUS at 20:21

## 2023-01-01 RX ADMIN — INSULIN LISPRO 8 UNITS: 100 INJECTION, SOLUTION INTRAVENOUS; SUBCUTANEOUS at 17:24

## 2023-01-01 RX ADMIN — PROPOFOL 20 MCG/KG/MIN: 10 INJECTION, EMULSION INTRAVENOUS at 06:42

## 2023-01-01 RX ADMIN — FUROSEMIDE 40 MG: 10 INJECTION, SOLUTION INTRAVENOUS at 23:52

## 2023-01-01 RX ADMIN — INSULIN DETEMIR 25 UNITS: 100 INJECTION, SOLUTION SUBCUTANEOUS at 21:01

## 2023-01-01 RX ADMIN — IPRATROPIUM BROMIDE 0.5 MG: 0.5 SOLUTION RESPIRATORY (INHALATION) at 14:09

## 2023-01-01 RX ADMIN — SUCRALFATE 1 G: 1 TABLET ORAL at 12:12

## 2023-01-01 RX ADMIN — GABAPENTIN 300 MG: 300 CAPSULE ORAL at 21:56

## 2023-01-01 RX ADMIN — PANTOPRAZOLE SODIUM 40 MG: 40 INJECTION, POWDER, FOR SOLUTION INTRAVENOUS at 05:02

## 2023-01-01 RX ADMIN — MEROPENEM 1 G: 1 INJECTION, POWDER, FOR SOLUTION INTRAVENOUS at 21:06

## 2023-01-01 RX ADMIN — LEVETIRACETAM 500 MG: 500 TABLET, FILM COATED ORAL at 09:18

## 2023-01-01 RX ADMIN — SUCRALFATE 1 G: 1 TABLET ORAL at 18:17

## 2023-01-01 RX ADMIN — VANCOMYCIN HYDROCHLORIDE 1000 MG: 1 INJECTION, POWDER, LYOPHILIZED, FOR SOLUTION INTRAVENOUS at 04:06

## 2023-01-01 RX ADMIN — Medication 1 APPLICATION: at 20:01

## 2023-01-01 RX ADMIN — HEPARIN SODIUM 5000 UNITS: 5000 INJECTION, SOLUTION INTRAVENOUS; SUBCUTANEOUS at 11:26

## 2023-01-01 RX ADMIN — Medication 10 ML: at 08:13

## 2023-01-01 RX ADMIN — METHYLNALTREXONE BROMIDE 12 MG: 12 INJECTION, SOLUTION SUBCUTANEOUS at 08:12

## 2023-01-01 RX ADMIN — FUROSEMIDE 40 MG: 10 INJECTION, SOLUTION INTRAMUSCULAR; INTRAVENOUS at 08:13

## 2023-01-01 RX ADMIN — BUDESONIDE 0.5 MG: 0.5 INHALANT RESPIRATORY (INHALATION) at 19:35

## 2023-01-01 RX ADMIN — METHYLPREDNISOLONE SODIUM SUCCINATE 60 MG: 125 INJECTION, POWDER, FOR SOLUTION INTRAMUSCULAR; INTRAVENOUS at 15:06

## 2023-01-01 RX ADMIN — METOCLOPRAMIDE 10 MG: 10 TABLET ORAL at 07:55

## 2023-01-01 RX ADMIN — INSULIN LISPRO 8 UNITS: 100 INJECTION, SOLUTION INTRAVENOUS; SUBCUTANEOUS at 09:04

## 2023-01-01 RX ADMIN — CALCIUM CARBONATE 2 TABLET: 500 TABLET, CHEWABLE ORAL at 11:31

## 2023-01-01 RX ADMIN — SUCRALFATE 1 G: 1 TABLET ORAL at 16:36

## 2023-01-01 RX ADMIN — BUTALBITAL, ACETAMINOPHEN, AND CAFFEINE 1 TABLET: 50; 325; 40 TABLET ORAL at 11:32

## 2023-01-01 RX ADMIN — LEVETIRACETAM 500 MG: 500 TABLET, FILM COATED ORAL at 08:06

## 2023-01-01 RX ADMIN — GABAPENTIN 300 MG: 300 CAPSULE ORAL at 08:11

## 2023-01-01 RX ADMIN — GABAPENTIN 300 MG: 250 SOLUTION ORAL at 22:27

## 2023-01-01 RX ADMIN — TAZOBACTAM SODIUM AND PIPERACILLIN SODIUM 3.38 G: 375; 3 INJECTION, SOLUTION INTRAVENOUS at 08:52

## 2023-01-01 RX ADMIN — MEROPENEM 1 G: 1 INJECTION, POWDER, FOR SOLUTION INTRAVENOUS at 06:07

## 2023-01-01 RX ADMIN — INSULIN DETEMIR 30 UNITS: 100 INJECTION, SOLUTION SUBCUTANEOUS at 21:58

## 2023-01-01 RX ADMIN — DEXTROSE MONOHYDRATE 25 G: 25 INJECTION, SOLUTION INTRAVENOUS at 17:37

## 2023-01-01 RX ADMIN — FUROSEMIDE 40 MG: 10 INJECTION, SOLUTION INTRAVENOUS at 21:19

## 2023-01-01 RX ADMIN — POLYETHYLENE GLYCOL 3350 17 G: 17 POWDER, FOR SOLUTION ORAL at 08:00

## 2023-01-01 RX ADMIN — IPRATROPIUM BROMIDE 0.5 MG: 0.5 SOLUTION RESPIRATORY (INHALATION) at 10:10

## 2023-01-01 RX ADMIN — IPRATROPIUM BROMIDE 0.5 MG: 0.5 SOLUTION RESPIRATORY (INHALATION) at 10:03

## 2023-01-01 RX ADMIN — SUCRALFATE 1 G: 1 TABLET ORAL at 08:10

## 2023-01-01 RX ADMIN — INSULIN LISPRO 3 UNITS: 100 INJECTION, SOLUTION INTRAVENOUS; SUBCUTANEOUS at 00:16

## 2023-01-01 RX ADMIN — SUCRALFATE 1 G: 1 TABLET ORAL at 07:37

## 2023-01-01 RX ADMIN — LEVETIRACETAM 500 MG: 500 TABLET, FILM COATED ORAL at 08:30

## 2023-01-01 RX ADMIN — DEXMEDETOMIDINE HYDROCHLORIDE 0.8 MCG/KG/HR: 4 INJECTION, SOLUTION INTRAVENOUS at 21:12

## 2023-01-01 RX ADMIN — ASPIRIN 81 MG: 81 TABLET, CHEWABLE ORAL at 08:43

## 2023-01-01 RX ADMIN — IPRATROPIUM BROMIDE 0.5 MG: 0.5 SOLUTION RESPIRATORY (INHALATION) at 14:55

## 2023-01-01 RX ADMIN — LORAZEPAM 1 MG: 2 INJECTION INTRAMUSCULAR; INTRAVENOUS at 22:35

## 2023-01-01 RX ADMIN — DEXMEDETOMIDINE HYDROCHLORIDE 0.8 MCG/KG/HR: 4 INJECTION, SOLUTION INTRAVENOUS at 00:07

## 2023-01-01 RX ADMIN — ATORVASTATIN CALCIUM 20 MG: 10 TABLET, FILM COATED ORAL at 21:54

## 2023-01-01 RX ADMIN — IPRATROPIUM BROMIDE 0.5 MG: 0.5 SOLUTION RESPIRATORY (INHALATION) at 06:00

## 2023-01-01 RX ADMIN — METOPROLOL TARTRATE 50 MG: 50 TABLET, FILM COATED ORAL at 08:53

## 2023-01-01 RX ADMIN — ASPIRIN 81 MG: 81 TABLET, CHEWABLE ORAL at 10:19

## 2023-01-01 RX ADMIN — IPRATROPIUM BROMIDE 0.5 MG: 0.5 SOLUTION RESPIRATORY (INHALATION) at 14:17

## 2023-01-01 RX ADMIN — POLYETHYLENE GLYCOL 3350 17 G: 17 POWDER, FOR SOLUTION ORAL at 08:12

## 2023-01-01 RX ADMIN — ASPIRIN 81 MG: 81 TABLET, CHEWABLE ORAL at 08:04

## 2023-01-01 RX ADMIN — IPRATROPIUM BROMIDE 0.5 MG: 0.5 SOLUTION RESPIRATORY (INHALATION) at 18:41

## 2023-01-01 RX ADMIN — INSULIN DETEMIR 30 UNITS: 100 INJECTION, SOLUTION SUBCUTANEOUS at 21:53

## 2023-01-01 RX ADMIN — SUCRALFATE 1 G: 1 TABLET ORAL at 07:30

## 2023-01-01 RX ADMIN — DILTIAZEM HYDROCHLORIDE 90 MG: 90 TABLET, FILM COATED ORAL at 05:57

## 2023-01-01 RX ADMIN — DIGOXIN 250 MCG: 250 TABLET ORAL at 11:32

## 2023-01-01 RX ADMIN — TAZOBACTAM SODIUM AND PIPERACILLIN SODIUM 3.38 G: 375; 3 INJECTION, SOLUTION INTRAVENOUS at 05:57

## 2023-01-01 RX ADMIN — ACETAMINOPHEN 650 MG: 325 TABLET ORAL at 00:07

## 2023-01-01 RX ADMIN — PROPOFOL 40 MCG/KG/MIN: 10 INJECTION, EMULSION INTRAVENOUS at 12:50

## 2023-01-01 RX ADMIN — INSULIN DETEMIR 25 UNITS: 100 INJECTION, SOLUTION SUBCUTANEOUS at 10:19

## 2023-01-01 RX ADMIN — OXYCODONE HYDROCHLORIDE AND ACETAMINOPHEN 1 TABLET: 10; 325 TABLET ORAL at 10:49

## 2023-01-01 RX ADMIN — LEVOFLOXACIN 500 MG: 500 TABLET, FILM COATED ORAL at 08:00

## 2023-01-01 RX ADMIN — TAZOBACTAM SODIUM AND PIPERACILLIN SODIUM 3.38 G: 375; 3 INJECTION, SOLUTION INTRAVENOUS at 00:30

## 2023-01-01 RX ADMIN — LEVETIRACETAM 500 MG: 500 TABLET, FILM COATED ORAL at 09:46

## 2023-01-01 RX ADMIN — Medication 1 APPLICATION: at 20:43

## 2023-01-01 RX ADMIN — DILTIAZEM HYDROCHLORIDE 90 MG: 90 TABLET, FILM COATED ORAL at 13:54

## 2023-01-01 RX ADMIN — INSULIN DETEMIR 30 UNITS: 100 INJECTION, SOLUTION SUBCUTANEOUS at 20:47

## 2023-01-01 RX ADMIN — HEPARIN SODIUM 5000 UNITS: 5000 INJECTION, SOLUTION INTRAVENOUS; SUBCUTANEOUS at 20:40

## 2023-01-01 RX ADMIN — SUCRALFATE 1 G: 1 TABLET ORAL at 20:36

## 2023-01-01 RX ADMIN — OXYCODONE HYDROCHLORIDE AND ACETAMINOPHEN 1 TABLET: 10; 325 TABLET ORAL at 09:12

## 2023-01-01 RX ADMIN — GABAPENTIN 300 MG: 300 CAPSULE ORAL at 16:06

## 2023-01-01 RX ADMIN — METOPROLOL TARTRATE 50 MG: 50 TABLET, FILM COATED ORAL at 20:32

## 2023-01-01 RX ADMIN — GABAPENTIN 300 MG: 300 CAPSULE ORAL at 08:12

## 2023-01-01 RX ADMIN — DEXMEDETOMIDINE HYDROCHLORIDE 0.2 MCG/KG/HR: 4 INJECTION, SOLUTION INTRAVENOUS at 10:30

## 2023-01-01 RX ADMIN — FUROSEMIDE 40 MG: 40 TABLET ORAL at 08:48

## 2023-01-01 RX ADMIN — LEVETIRACETAM 500 MG: 500 TABLET, FILM COATED ORAL at 21:24

## 2023-01-01 RX ADMIN — PANTOPRAZOLE SODIUM 40 MG: 40 INJECTION, POWDER, FOR SOLUTION INTRAVENOUS at 05:12

## 2023-01-01 RX ADMIN — DIGOXIN 250 MCG: 250 TABLET ORAL at 12:12

## 2023-01-01 RX ADMIN — METOCLOPRAMIDE 10 MG: 10 TABLET ORAL at 17:48

## 2023-01-01 RX ADMIN — LEVETIRACETAM 500 MG: 500 TABLET, FILM COATED ORAL at 10:20

## 2023-01-01 RX ADMIN — DEXMEDETOMIDINE HYDROCHLORIDE 1 MCG/KG/HR: 4 INJECTION, SOLUTION INTRAVENOUS at 23:29

## 2023-01-01 RX ADMIN — PROPOFOL 40 MCG/KG/MIN: 10 INJECTION, EMULSION INTRAVENOUS at 11:41

## 2023-01-01 RX ADMIN — FUROSEMIDE 40 MG: 10 INJECTION, SOLUTION INTRAVENOUS at 08:23

## 2023-01-01 RX ADMIN — DOCUSATE SODIUM 50 MG AND SENNOSIDES 8.6 MG 1 TABLET: 8.6; 5 TABLET, FILM COATED ORAL at 16:12

## 2023-01-01 RX ADMIN — PROPOFOL 40 MCG/KG/MIN: 10 INJECTION, EMULSION INTRAVENOUS at 05:52

## 2023-01-01 RX ADMIN — INSULIN LISPRO 5 UNITS: 100 INJECTION, SOLUTION INTRAVENOUS; SUBCUTANEOUS at 11:54

## 2023-01-01 RX ADMIN — ENOXAPARIN SODIUM 120 MG: 150 INJECTION SUBCUTANEOUS at 20:45

## 2023-01-01 RX ADMIN — LEVETIRACETAM 500 MG: 500 TABLET, FILM COATED ORAL at 20:18

## 2023-01-01 RX ADMIN — PROPOFOL 30 MCG/KG/MIN: 10 INJECTION, EMULSION INTRAVENOUS at 19:36

## 2023-01-01 RX ADMIN — METOPROLOL TARTRATE 50 MG: 50 TABLET, FILM COATED ORAL at 10:20

## 2023-01-01 RX ADMIN — MAGNESIUM SULFATE HEPTAHYDRATE 1 G: 500 INJECTION, SOLUTION INTRAMUSCULAR; INTRAVENOUS at 07:52

## 2023-01-01 RX ADMIN — FUROSEMIDE 40 MG: 10 INJECTION, SOLUTION INTRAMUSCULAR; INTRAVENOUS at 18:27

## 2023-01-01 RX ADMIN — DILTIAZEM HYDROCHLORIDE 90 MG: 90 TABLET, FILM COATED ORAL at 14:19

## 2023-01-01 RX ADMIN — INSULIN LISPRO 3 UNITS: 100 INJECTION, SOLUTION INTRAVENOUS; SUBCUTANEOUS at 00:19

## 2023-01-01 RX ADMIN — ONDANSETRON 4 MG: 2 INJECTION INTRAMUSCULAR; INTRAVENOUS at 01:09

## 2023-01-01 RX ADMIN — METOPROLOL TARTRATE 50 MG: 50 TABLET, FILM COATED ORAL at 20:07

## 2023-01-01 RX ADMIN — Medication 10 ML: at 08:14

## 2023-01-01 RX ADMIN — SUCRALFATE 1 G: 1 TABLET ORAL at 12:09

## 2023-01-01 RX ADMIN — IPRATROPIUM BROMIDE 0.5 MG: 0.5 SOLUTION RESPIRATORY (INHALATION) at 10:00

## 2023-01-01 RX ADMIN — TAZOBACTAM SODIUM AND PIPERACILLIN SODIUM 3.38 G: 375; 3 INJECTION, SOLUTION INTRAVENOUS at 00:01

## 2023-01-01 RX ADMIN — GABAPENTIN 300 MG: 300 CAPSULE ORAL at 15:34

## 2023-01-01 RX ADMIN — METOCLOPRAMIDE 10 MG: 10 TABLET ORAL at 18:02

## 2023-01-01 RX ADMIN — POTASSIUM CHLORIDE 20 MEQ: 10 CAPSULE, COATED, EXTENDED RELEASE ORAL at 08:12

## 2023-01-01 RX ADMIN — BUDESONIDE AND FORMOTEROL FUMARATE DIHYDRATE 2 PUFF: 160; 4.5 AEROSOL RESPIRATORY (INHALATION) at 07:07

## 2023-01-01 RX ADMIN — BUDESONIDE AND FORMOTEROL FUMARATE DIHYDRATE 2 PUFF: 160; 4.5 AEROSOL RESPIRATORY (INHALATION) at 19:20

## 2023-01-01 RX ADMIN — METOPROLOL TARTRATE 50 MG: 50 TABLET, FILM COATED ORAL at 08:43

## 2023-01-01 RX ADMIN — LEVETIRACETAM 500 MG: 500 TABLET, FILM COATED ORAL at 08:59

## 2023-01-01 RX ADMIN — INSULIN LISPRO 3 UNITS: 100 INJECTION, SOLUTION INTRAVENOUS; SUBCUTANEOUS at 11:41

## 2023-01-01 RX ADMIN — Medication 10 ML: at 20:45

## 2023-01-01 RX ADMIN — FUROSEMIDE 40 MG: 10 INJECTION, SOLUTION INTRAMUSCULAR; INTRAVENOUS at 18:02

## 2023-01-01 RX ADMIN — GABAPENTIN 300 MG: 250 SOLUTION ORAL at 05:41

## 2023-01-01 RX ADMIN — DEXMEDETOMIDINE HYDROCHLORIDE 1 MCG/KG/HR: 4 INJECTION, SOLUTION INTRAVENOUS at 03:08

## 2023-01-01 RX ADMIN — Medication 10 ML: at 20:42

## 2023-01-01 RX ADMIN — IPRATROPIUM BROMIDE 0.5 MG: 0.5 SOLUTION RESPIRATORY (INHALATION) at 19:41

## 2023-01-01 RX ADMIN — LEVETIRACETAM 500 MG: 500 TABLET, FILM COATED ORAL at 08:41

## 2023-01-01 RX ADMIN — Medication 1 TABLET: at 08:56

## 2023-01-01 RX ADMIN — POTASSIUM CHLORIDE 20 MEQ: 29.8 INJECTION, SOLUTION INTRAVENOUS at 17:13

## 2023-01-01 RX ADMIN — METHYLNALTREXONE BROMIDE 12 MG: 12 INJECTION, SOLUTION SUBCUTANEOUS at 10:01

## 2023-01-01 RX ADMIN — MEROPENEM 1 G: 1 INJECTION, POWDER, FOR SOLUTION INTRAVENOUS at 04:50

## 2023-01-01 RX ADMIN — PANTOPRAZOLE SODIUM 40 MG: 40 INJECTION, POWDER, FOR SOLUTION INTRAVENOUS at 05:59

## 2023-01-01 RX ADMIN — EPINEPHRINE 1 MG: 0.1 INJECTION INTRACARDIAC; INTRAVENOUS at 07:46

## 2023-01-01 RX ADMIN — METOPROLOL TARTRATE 50 MG: 50 TABLET, FILM COATED ORAL at 08:30

## 2023-01-01 RX ADMIN — DILTIAZEM HYDROCHLORIDE 90 MG: 90 TABLET, FILM COATED ORAL at 06:18

## 2023-01-01 RX ADMIN — Medication 1 APPLICATION: at 08:34

## 2023-01-01 RX ADMIN — MORPHINE SULFATE 5 MG: 4 INJECTION, SOLUTION INTRAMUSCULAR; INTRAVENOUS at 11:42

## 2023-01-01 RX ADMIN — DILTIAZEM HYDROCHLORIDE 90 MG: 90 TABLET, FILM COATED ORAL at 22:15

## 2023-01-01 RX ADMIN — METOPROLOL TARTRATE 25 MG: 25 TABLET, FILM COATED ORAL at 10:21

## 2023-01-01 RX ADMIN — DILTIAZEM HYDROCHLORIDE 240 MG: 240 CAPSULE, EXTENDED RELEASE ORAL at 08:30

## 2023-01-01 RX ADMIN — LEVETIRACETAM 500 MG: 500 TABLET, FILM COATED ORAL at 08:23

## 2023-01-01 RX ADMIN — PROPOFOL 50 MCG/KG/MIN: 10 INJECTION, EMULSION INTRAVENOUS at 08:10

## 2023-01-01 RX ADMIN — INSULIN DETEMIR 25 UNITS: 100 INJECTION, SOLUTION SUBCUTANEOUS at 21:10

## 2023-01-01 RX ADMIN — INSULIN LISPRO 5 UNITS: 100 INJECTION, SOLUTION INTRAVENOUS; SUBCUTANEOUS at 00:05

## 2023-01-01 RX ADMIN — DEXMEDETOMIDINE HYDROCHLORIDE 0.5 MCG/KG/HR: 4 INJECTION, SOLUTION INTRAVENOUS at 14:18

## 2023-01-01 RX ADMIN — PANTOPRAZOLE SODIUM 40 MG: 40 INJECTION, POWDER, FOR SOLUTION INTRAVENOUS at 06:49

## 2023-01-01 RX ADMIN — SODIUM CHLORIDE, POTASSIUM CHLORIDE, SODIUM LACTATE AND CALCIUM CHLORIDE 100 ML/HR: 600; 310; 30; 20 INJECTION, SOLUTION INTRAVENOUS at 22:40

## 2023-01-01 RX ADMIN — INSULIN LISPRO 5 UNITS: 100 INJECTION, SOLUTION INTRAVENOUS; SUBCUTANEOUS at 17:48

## 2023-01-01 RX ADMIN — HYDROMORPHONE HYDROCHLORIDE 0.5 MG: 1 INJECTION, SOLUTION INTRAMUSCULAR; INTRAVENOUS; SUBCUTANEOUS at 12:39

## 2023-01-01 RX ADMIN — PROPOFOL 50 MCG/KG/MIN: 10 INJECTION, EMULSION INTRAVENOUS at 03:41

## 2023-01-01 RX ADMIN — INSULIN DETEMIR 30 UNITS: 100 INJECTION, SOLUTION SUBCUTANEOUS at 08:07

## 2023-01-01 RX ADMIN — DIGOXIN 125 MCG: 0.12 TABLET ORAL at 13:24

## 2023-01-01 RX ADMIN — INSULIN DETEMIR 30 UNITS: 100 INJECTION, SOLUTION SUBCUTANEOUS at 09:05

## 2023-01-01 RX ADMIN — POTASSIUM CHLORIDE, DEXTROSE MONOHYDRATE AND SODIUM CHLORIDE 150 ML/HR: 150; 5; 450 INJECTION, SOLUTION INTRAVENOUS at 23:15

## 2023-01-01 RX ADMIN — OXYCODONE AND ACETAMINOPHEN 1 TABLET: 325; 10 TABLET ORAL at 13:27

## 2023-01-01 RX ADMIN — LEVETIRACETAM 500 MG: 500 TABLET, FILM COATED ORAL at 20:30

## 2023-01-01 RX ADMIN — SODIUM CHLORIDE 31.1 UNITS/HR: 9 INJECTION, SOLUTION INTRAVENOUS at 09:38

## 2023-01-01 RX ADMIN — GABAPENTIN 300 MG: 250 SOLUTION ORAL at 06:19

## 2023-01-01 RX ADMIN — GABAPENTIN 300 MG: 250 SOLUTION ORAL at 15:46

## 2023-01-01 RX ADMIN — LEVETIRACETAM 500 MG: 500 TABLET, FILM COATED ORAL at 20:28

## 2023-01-01 RX ADMIN — BUDESONIDE AND FORMOTEROL FUMARATE DIHYDRATE 2 PUFF: 160; 4.5 AEROSOL RESPIRATORY (INHALATION) at 07:11

## 2023-01-01 RX ADMIN — POLYETHYLENE GLYCOL 3350 17 G: 17 POWDER, FOR SOLUTION ORAL at 08:06

## 2023-01-01 RX ADMIN — DILTIAZEM HYDROCHLORIDE 90 MG: 90 TABLET, FILM COATED ORAL at 14:55

## 2023-01-01 RX ADMIN — Medication 10 ML: at 21:11

## 2023-01-01 RX ADMIN — DILTIAZEM HYDROCHLORIDE 90 MG: 90 TABLET, FILM COATED ORAL at 05:25

## 2023-01-01 RX ADMIN — INSULIN DETEMIR 10 UNITS: 100 INJECTION, SOLUTION SUBCUTANEOUS at 21:00

## 2023-01-01 RX ADMIN — DILTIAZEM HYDROCHLORIDE 90 MG: 90 TABLET, FILM COATED ORAL at 14:56

## 2023-01-01 RX ADMIN — Medication 0.04 MCG/KG/MIN: at 19:12

## 2023-01-01 RX ADMIN — HEPARIN SODIUM 5000 UNITS: 5000 INJECTION, SOLUTION INTRAVENOUS; SUBCUTANEOUS at 09:04

## 2023-01-01 RX ADMIN — PROPOFOL 20 MCG/KG/MIN: 10 INJECTION, EMULSION INTRAVENOUS at 00:22

## 2023-01-01 RX ADMIN — BUTALBITAL, ACETAMINOPHEN, AND CAFFEINE 1 TABLET: 50; 325; 40 TABLET ORAL at 18:48

## 2023-01-01 RX ADMIN — SUCRALFATE 1 G: 1 TABLET ORAL at 11:21

## 2023-01-01 RX ADMIN — PANTOPRAZOLE SODIUM 40 MG: 40 TABLET, DELAYED RELEASE ORAL at 05:13

## 2023-01-01 RX ADMIN — ENOXAPARIN SODIUM 120 MG: 150 INJECTION SUBCUTANEOUS at 20:43

## 2023-01-01 RX ADMIN — ATORVASTATIN CALCIUM 20 MG: 10 TABLET, FILM COATED ORAL at 20:45

## 2023-01-01 RX ADMIN — CHLORHEXIDINE GLUCONATE 1 APPLICATION: 500 CLOTH TOPICAL at 03:10

## 2023-01-01 RX ADMIN — Medication 1 APPLICATION: at 08:11

## 2023-01-01 RX ADMIN — PROPOFOL 20 MCG/KG/MIN: 10 INJECTION, EMULSION INTRAVENOUS at 18:17

## 2023-01-01 RX ADMIN — ASPIRIN 81 MG: 81 TABLET, CHEWABLE ORAL at 08:48

## 2023-01-01 RX ADMIN — Medication 1 APPLICATION: at 20:07

## 2023-01-01 RX ADMIN — GABAPENTIN 300 MG: 250 SOLUTION ORAL at 21:23

## 2023-01-01 RX ADMIN — METOCLOPRAMIDE 10 MG: 10 TABLET ORAL at 11:33

## 2023-01-01 RX ADMIN — METOPROLOL TARTRATE 50 MG: 50 TABLET, FILM COATED ORAL at 08:11

## 2023-01-01 RX ADMIN — MEROPENEM 1 G: 1 INJECTION, POWDER, FOR SOLUTION INTRAVENOUS at 06:13

## 2023-01-01 RX ADMIN — METHYLNALTREXONE BROMIDE 12 MG: 12 INJECTION, SOLUTION SUBCUTANEOUS at 08:06

## 2023-01-01 RX ADMIN — DIGOXIN 125 MCG: 0.25 INJECTION INTRAMUSCULAR; INTRAVENOUS at 10:08

## 2023-01-01 RX ADMIN — ACETAMINOPHEN 650 MG: 325 TABLET ORAL at 11:52

## 2023-01-01 RX ADMIN — FUROSEMIDE 40 MG: 10 INJECTION, SOLUTION INTRAMUSCULAR; INTRAVENOUS at 09:18

## 2023-01-01 RX ADMIN — SUCRALFATE 1 G: 1 TABLET ORAL at 12:08

## 2023-01-01 RX ADMIN — INSULIN DETEMIR 25 UNITS: 100 INJECTION, SOLUTION SUBCUTANEOUS at 20:33

## 2023-01-01 RX ADMIN — IPRATROPIUM BROMIDE 0.5 MG: 0.5 SOLUTION RESPIRATORY (INHALATION) at 11:03

## 2023-01-01 RX ADMIN — SODIUM CHLORIDE, POTASSIUM CHLORIDE, SODIUM LACTATE AND CALCIUM CHLORIDE 100 ML/HR: 600; 310; 30; 20 INJECTION, SOLUTION INTRAVENOUS at 09:24

## 2023-01-01 RX ADMIN — ATORVASTATIN CALCIUM 20 MG: 10 TABLET, FILM COATED ORAL at 20:32

## 2023-01-01 RX ADMIN — PANTOPRAZOLE SODIUM 40 MG: 40 TABLET, DELAYED RELEASE ORAL at 05:16

## 2023-01-01 RX ADMIN — METOCLOPRAMIDE 10 MG: 10 TABLET ORAL at 10:20

## 2023-01-01 RX ADMIN — AMIODARONE HYDROCHLORIDE 300 MG: 50 INJECTION, SOLUTION INTRAVENOUS at 07:45

## 2023-01-01 RX ADMIN — IPRATROPIUM BROMIDE 0.5 MG: 0.5 SOLUTION RESPIRATORY (INHALATION) at 10:22

## 2023-01-01 RX ADMIN — GABAPENTIN 300 MG: 250 SOLUTION ORAL at 21:56

## 2023-01-01 RX ADMIN — INSULIN LISPRO 3 UNITS: 100 INJECTION, SOLUTION INTRAVENOUS; SUBCUTANEOUS at 18:12

## 2023-01-01 RX ADMIN — DILTIAZEM HYDROCHLORIDE 90 MG: 90 TABLET, FILM COATED ORAL at 14:35

## 2023-01-01 RX ADMIN — LEVETIRACETAM 500 MG: 500 TABLET, FILM COATED ORAL at 20:00

## 2023-01-01 RX ADMIN — POLYETHYLENE GLYCOL 3350 17 G: 17 POWDER, FOR SOLUTION ORAL at 10:21

## 2023-01-01 RX ADMIN — METOCLOPRAMIDE 10 MG: 10 TABLET ORAL at 17:27

## 2023-01-01 RX ADMIN — DILTIAZEM HYDROCHLORIDE 90 MG: 90 TABLET, FILM COATED ORAL at 06:11

## 2023-01-01 RX ADMIN — MEROPENEM 1 G: 1 INJECTION, POWDER, FOR SOLUTION INTRAVENOUS at 05:55

## 2023-01-01 RX ADMIN — METOPROLOL TARTRATE 50 MG: 50 TABLET, FILM COATED ORAL at 21:16

## 2023-01-01 RX ADMIN — BUDESONIDE AND FORMOTEROL FUMARATE DIHYDRATE 2 PUFF: 160; 4.5 AEROSOL RESPIRATORY (INHALATION) at 19:18

## 2023-01-01 RX ADMIN — INSULIN LISPRO 3 UNITS: 100 INJECTION, SOLUTION INTRAVENOUS; SUBCUTANEOUS at 17:33

## 2023-01-01 RX ADMIN — LEVETIRACETAM 500 MG: 500 TABLET, FILM COATED ORAL at 08:55

## 2023-01-01 RX ADMIN — ASPIRIN 81 MG: 81 TABLET, CHEWABLE ORAL at 08:12

## 2023-01-01 RX ADMIN — LORAZEPAM 1 MG: 2 INJECTION INTRAMUSCULAR; INTRAVENOUS at 01:52

## 2023-01-01 RX ADMIN — IPRATROPIUM BROMIDE 0.5 MG: 0.5 SOLUTION RESPIRATORY (INHALATION) at 06:37

## 2023-01-01 RX ADMIN — IPRATROPIUM BROMIDE 0.5 MG: 0.5 SOLUTION RESPIRATORY (INHALATION) at 06:34

## 2023-01-01 RX ADMIN — ACETAMINOPHEN 650 MG: 325 TABLET ORAL at 19:28

## 2023-01-01 RX ADMIN — BUDESONIDE AND FORMOTEROL FUMARATE DIHYDRATE 2 PUFF: 160; 4.5 AEROSOL RESPIRATORY (INHALATION) at 19:30

## 2023-01-01 RX ADMIN — MEROPENEM 1 G: 1 INJECTION, POWDER, FOR SOLUTION INTRAVENOUS at 14:17

## 2023-01-01 RX ADMIN — FUROSEMIDE 40 MG: 10 INJECTION, SOLUTION INTRAMUSCULAR; INTRAVENOUS at 17:13

## 2023-01-01 RX ADMIN — DILTIAZEM HYDROCHLORIDE 90 MG: 90 TABLET, FILM COATED ORAL at 14:30

## 2023-01-01 RX ADMIN — LEVETIRACETAM 500 MG: 500 TABLET, FILM COATED ORAL at 21:29

## 2023-01-01 RX ADMIN — IOPAMIDOL 100 ML: 755 INJECTION, SOLUTION INTRAVENOUS at 21:47

## 2023-01-01 RX ADMIN — SUCRALFATE 1 G: 1 TABLET ORAL at 20:26

## 2023-01-01 RX ADMIN — DOCUSATE SODIUM 50 MG AND SENNOSIDES 8.6 MG 1 TABLET: 8.6; 5 TABLET, FILM COATED ORAL at 08:05

## 2023-01-01 RX ADMIN — VANCOMYCIN HYDROCHLORIDE 1000 MG: 1 INJECTION, POWDER, LYOPHILIZED, FOR SOLUTION INTRAVENOUS at 06:11

## 2023-01-01 RX ADMIN — OXYCODONE HYDROCHLORIDE AND ACETAMINOPHEN 1 TABLET: 10; 325 TABLET ORAL at 04:12

## 2023-01-01 RX ADMIN — METOPROLOL TARTRATE 50 MG: 50 TABLET, FILM COATED ORAL at 08:55

## 2023-01-01 RX ADMIN — INSULIN LISPRO 5 UNITS: 100 INJECTION, SOLUTION INTRAVENOUS; SUBCUTANEOUS at 12:08

## 2023-01-01 RX ADMIN — MEROPENEM 1 G: 1 INJECTION, POWDER, FOR SOLUTION INTRAVENOUS at 13:55

## 2023-01-01 RX ADMIN — METOCLOPRAMIDE 10 MG: 10 TABLET ORAL at 12:22

## 2023-01-01 RX ADMIN — METOCLOPRAMIDE 10 MG: 10 TABLET ORAL at 18:01

## 2023-01-01 RX ADMIN — METHYLNALTREXONE BROMIDE 12 MG: 12 INJECTION, SOLUTION SUBCUTANEOUS at 08:11

## 2023-01-01 RX ADMIN — DEXTROSE MONOHYDRATE 75 ML/HR: 50 INJECTION, SOLUTION INTRAVENOUS at 23:37

## 2023-01-01 RX ADMIN — MEROPENEM 1 G: 1 INJECTION, POWDER, FOR SOLUTION INTRAVENOUS at 14:49

## 2023-01-01 RX ADMIN — DILTIAZEM HYDROCHLORIDE 90 MG: 90 TABLET, FILM COATED ORAL at 15:37

## 2023-01-01 RX ADMIN — GABAPENTIN 300 MG: 250 SOLUTION ORAL at 15:37

## 2023-01-01 RX ADMIN — DILTIAZEM HYDROCHLORIDE 90 MG: 90 TABLET, FILM COATED ORAL at 13:56

## 2023-01-01 RX ADMIN — IPRATROPIUM BROMIDE 0.5 MG: 0.5 SOLUTION RESPIRATORY (INHALATION) at 18:29

## 2023-01-01 RX ADMIN — SUCRALFATE 1 G: 1 TABLET ORAL at 20:59

## 2023-01-01 RX ADMIN — INSULIN LISPRO 3 UNITS: 100 INJECTION, SOLUTION INTRAVENOUS; SUBCUTANEOUS at 11:43

## 2023-01-01 RX ADMIN — POLYETHYLENE GLYCOL 3350 17 G: 17 POWDER, FOR SOLUTION ORAL at 16:11

## 2023-01-01 RX ADMIN — Medication 10 ML: at 20:29

## 2023-01-01 RX ADMIN — SODIUM CHLORIDE 7.1 UNITS/HR: 9 INJECTION, SOLUTION INTRAVENOUS at 21:18

## 2023-01-01 RX ADMIN — OXYCODONE HYDROCHLORIDE AND ACETAMINOPHEN 1 TABLET: 10; 325 TABLET ORAL at 22:19

## 2023-01-01 RX ADMIN — LEVETIRACETAM 500 MG: 500 TABLET, FILM COATED ORAL at 21:54

## 2023-01-01 RX ADMIN — BUDESONIDE AND FORMOTEROL FUMARATE DIHYDRATE 2 PUFF: 160; 4.5 AEROSOL RESPIRATORY (INHALATION) at 07:00

## 2023-01-01 RX ADMIN — Medication 10 ML: at 20:01

## 2023-01-01 RX ADMIN — INSULIN LISPRO 3 UNITS: 100 INJECTION, SOLUTION INTRAVENOUS; SUBCUTANEOUS at 16:56

## 2023-01-01 RX ADMIN — METOPROLOL TARTRATE 50 MG: 50 TABLET, FILM COATED ORAL at 20:20

## 2023-01-01 RX ADMIN — INSULIN LISPRO 3 UNITS: 100 INJECTION, SOLUTION INTRAVENOUS; SUBCUTANEOUS at 08:12

## 2023-01-01 RX ADMIN — IPRATROPIUM BROMIDE 0.5 MG: 0.5 SOLUTION RESPIRATORY (INHALATION) at 19:43

## 2023-01-01 RX ADMIN — IPRATROPIUM BROMIDE 0.5 MG: 0.5 SOLUTION RESPIRATORY (INHALATION) at 14:10

## 2023-01-01 RX ADMIN — PROPOFOL 20 MCG/KG/MIN: 10 INJECTION, EMULSION INTRAVENOUS at 18:38

## 2023-01-01 RX ADMIN — PROPOFOL 5 MCG/KG/MIN: 10 INJECTION, EMULSION INTRAVENOUS at 02:53

## 2023-01-01 RX ADMIN — DEXMEDETOMIDINE HYDROCHLORIDE 0.8 MCG/KG/HR: 4 INJECTION, SOLUTION INTRAVENOUS at 08:11

## 2023-01-01 RX ADMIN — IPRATROPIUM BROMIDE 0.5 MG: 0.5 SOLUTION RESPIRATORY (INHALATION) at 19:18

## 2023-01-01 RX ADMIN — MEROPENEM 1 G: 1 INJECTION, POWDER, FOR SOLUTION INTRAVENOUS at 22:21

## 2023-01-01 RX ADMIN — Medication 10 ML: at 20:25

## 2023-01-01 RX ADMIN — Medication 10 ML: at 20:57

## 2023-01-01 RX ADMIN — INSULIN DETEMIR 25 UNITS: 100 INJECTION, SOLUTION SUBCUTANEOUS at 09:05

## 2023-01-01 RX ADMIN — METOCLOPRAMIDE 10 MG: 10 TABLET ORAL at 11:55

## 2023-01-01 RX ADMIN — PROPOFOL 35 MCG/KG/MIN: 10 INJECTION, EMULSION INTRAVENOUS at 16:05

## 2023-01-01 RX ADMIN — LORAZEPAM 1 MG: 2 INJECTION INTRAMUSCULAR; INTRAVENOUS at 03:51

## 2023-01-01 RX ADMIN — LEVOFLOXACIN 500 MG: 500 TABLET, FILM COATED ORAL at 09:23

## 2023-01-01 RX ADMIN — GABAPENTIN 300 MG: 300 CAPSULE ORAL at 08:30

## 2023-01-01 RX ADMIN — DEXMEDETOMIDINE HYDROCHLORIDE 0.8 MCG/KG/HR: 4 INJECTION, SOLUTION INTRAVENOUS at 12:16

## 2023-01-01 RX ADMIN — POTASSIUM CHLORIDE 20 MEQ: 10 CAPSULE, COATED, EXTENDED RELEASE ORAL at 08:01

## 2023-01-01 RX ADMIN — Medication 10 ML: at 09:27

## 2023-01-01 RX ADMIN — METOPROLOL TARTRATE 50 MG: 50 TABLET, FILM COATED ORAL at 21:55

## 2023-01-01 RX ADMIN — VANCOMYCIN HYDROCHLORIDE 1250 MG: 10 INJECTION, POWDER, LYOPHILIZED, FOR SOLUTION INTRAVENOUS at 03:56

## 2023-01-01 RX ADMIN — OXYCODONE AND ACETAMINOPHEN 1 TABLET: 325; 10 TABLET ORAL at 02:50

## 2023-01-01 RX ADMIN — DIGOXIN 250 MCG: 250 TABLET ORAL at 11:38

## 2023-01-01 RX ADMIN — INSULIN LISPRO 3 UNITS: 100 INJECTION, SOLUTION INTRAVENOUS; SUBCUTANEOUS at 11:31

## 2023-01-01 RX ADMIN — POLYETHYLENE GLYCOL 3350 17 G: 17 POWDER, FOR SOLUTION ORAL at 08:03

## 2023-01-01 RX ADMIN — INSULIN DETEMIR 10 UNITS: 100 INJECTION, SOLUTION SUBCUTANEOUS at 20:27

## 2023-01-01 RX ADMIN — IPRATROPIUM BROMIDE 0.5 MG: 0.5 SOLUTION RESPIRATORY (INHALATION) at 18:17

## 2023-01-01 RX ADMIN — GABAPENTIN 300 MG: 300 CAPSULE ORAL at 08:04

## 2023-01-01 RX ADMIN — METOCLOPRAMIDE 10 MG: 10 TABLET ORAL at 19:51

## 2023-01-01 RX ADMIN — SUCRALFATE 1 G: 1 TABLET ORAL at 08:06

## 2023-01-01 RX ADMIN — PROPOFOL 50 MCG/KG/MIN: 10 INJECTION, EMULSION INTRAVENOUS at 03:12

## 2023-01-01 RX ADMIN — IPRATROPIUM BROMIDE AND ALBUTEROL SULFATE 3 ML: 2.5; .5 SOLUTION RESPIRATORY (INHALATION) at 22:55

## 2023-01-01 RX ADMIN — Medication 10 ML: at 08:11

## 2023-01-01 RX ADMIN — MEROPENEM 1 G: 1 INJECTION, POWDER, FOR SOLUTION INTRAVENOUS at 14:35

## 2023-01-01 RX ADMIN — POTASSIUM CHLORIDE 20 MEQ: 29.8 INJECTION, SOLUTION INTRAVENOUS at 18:34

## 2023-01-01 RX ADMIN — DILTIAZEM HYDROCHLORIDE 90 MG: 90 TABLET, FILM COATED ORAL at 05:23

## 2023-01-01 RX ADMIN — DIGOXIN 250 MCG: 250 TABLET ORAL at 12:14

## 2023-01-01 RX ADMIN — METOPROLOL TARTRATE 50 MG: 50 TABLET, FILM COATED ORAL at 08:01

## 2023-01-01 RX ADMIN — IPRATROPIUM BROMIDE 0.5 MG: 0.5 SOLUTION RESPIRATORY (INHALATION) at 09:23

## 2023-01-01 RX ADMIN — ASPIRIN 81 MG: 81 TABLET, CHEWABLE ORAL at 08:54

## 2023-01-01 RX ADMIN — SUCRALFATE 1 G: 1 TABLET ORAL at 12:14

## 2023-01-01 RX ADMIN — MEROPENEM 1 G: 1 INJECTION, POWDER, FOR SOLUTION INTRAVENOUS at 13:25

## 2023-01-01 RX ADMIN — METOPROLOL TARTRATE 50 MG: 50 TABLET, FILM COATED ORAL at 09:41

## 2023-01-01 RX ADMIN — METOPROLOL TARTRATE 50 MG: 50 TABLET, FILM COATED ORAL at 20:33

## 2023-01-01 RX ADMIN — ATORVASTATIN CALCIUM 20 MG: 10 TABLET, FILM COATED ORAL at 20:21

## 2023-01-01 RX ADMIN — HEPARIN SODIUM 5000 UNITS: 5000 INJECTION, SOLUTION INTRAVENOUS; SUBCUTANEOUS at 08:23

## 2023-01-01 RX ADMIN — METOCLOPRAMIDE 10 MG: 10 TABLET ORAL at 07:02

## 2023-01-01 RX ADMIN — DOCUSATE SODIUM 50 MG AND SENNOSIDES 8.6 MG 1 TABLET: 8.6; 5 TABLET, FILM COATED ORAL at 09:50

## 2023-01-01 RX ADMIN — ATORVASTATIN CALCIUM 20 MG: 10 TABLET, FILM COATED ORAL at 20:36

## 2023-01-01 RX ADMIN — PROPOFOL 10 MCG/KG/MIN: 10 INJECTION, EMULSION INTRAVENOUS at 03:08

## 2023-01-01 RX ADMIN — VANCOMYCIN HYDROCHLORIDE 1000 MG: 1 INJECTION, POWDER, LYOPHILIZED, FOR SOLUTION INTRAVENOUS at 00:37

## 2023-01-01 RX ADMIN — GABAPENTIN 300 MG: 250 SOLUTION ORAL at 14:25

## 2023-01-01 RX ADMIN — TAZOBACTAM SODIUM AND PIPERACILLIN SODIUM 4.5 G: 500; 4 INJECTION, SOLUTION INTRAVENOUS at 15:22

## 2023-01-01 RX ADMIN — PANTOPRAZOLE SODIUM 40 MG: 40 INJECTION, POWDER, FOR SOLUTION INTRAVENOUS at 05:25

## 2023-01-01 RX ADMIN — LEVETIRACETAM 500 MG: 500 TABLET, FILM COATED ORAL at 21:03

## 2023-01-01 RX ADMIN — METOPROLOL TARTRATE 50 MG: 50 TABLET, FILM COATED ORAL at 21:24

## 2023-01-01 RX ADMIN — PROPOFOL 15 MCG/KG/MIN: 10 INJECTION, EMULSION INTRAVENOUS at 19:14

## 2023-01-01 RX ADMIN — PANTOPRAZOLE SODIUM 40 MG: 40 INJECTION, POWDER, FOR SOLUTION INTRAVENOUS at 05:23

## 2023-01-01 RX ADMIN — MEROPENEM 1 G: 1 INJECTION, POWDER, FOR SOLUTION INTRAVENOUS at 22:18

## 2023-01-01 RX ADMIN — PANTOPRAZOLE SODIUM 40 MG: 40 INJECTION, POWDER, FOR SOLUTION INTRAVENOUS at 05:58

## 2023-01-01 RX ADMIN — POLYETHYLENE GLYCOL 3350 17 G: 17 POWDER, FOR SOLUTION ORAL at 09:49

## 2023-01-01 RX ADMIN — BUDESONIDE 0.5 MG: 0.5 INHALANT RESPIRATORY (INHALATION) at 06:13

## 2023-01-01 RX ADMIN — DILTIAZEM HYDROCHLORIDE 90 MG: 90 TABLET, FILM COATED ORAL at 21:24

## 2023-01-01 RX ADMIN — HEPARIN SODIUM 5000 UNITS: 5000 INJECTION, SOLUTION INTRAVENOUS; SUBCUTANEOUS at 20:42

## 2023-01-01 RX ADMIN — VANCOMYCIN HYDROCHLORIDE 2500 MG: 1 INJECTION, POWDER, LYOPHILIZED, FOR SOLUTION INTRAVENOUS at 03:08

## 2023-01-01 RX ADMIN — METOPROLOL TARTRATE 50 MG: 50 TABLET, FILM COATED ORAL at 09:58

## 2023-01-01 RX ADMIN — DOCUSATE SODIUM 50 MG AND SENNOSIDES 8.6 MG 1 TABLET: 8.6; 5 TABLET, FILM COATED ORAL at 09:58

## 2023-01-01 RX ADMIN — FUROSEMIDE 40 MG: 10 INJECTION, SOLUTION INTRAMUSCULAR; INTRAVENOUS at 17:52

## 2023-01-01 RX ADMIN — METOPROLOL TARTRATE 50 MG: 50 TABLET, FILM COATED ORAL at 20:00

## 2023-01-01 RX ADMIN — CHLORHEXIDINE GLUCONATE 1 APPLICATION: 500 CLOTH TOPICAL at 06:11

## 2023-01-01 RX ADMIN — VANCOMYCIN HYDROCHLORIDE 1000 MG: 1 INJECTION, POWDER, LYOPHILIZED, FOR SOLUTION INTRAVENOUS at 02:25

## 2023-01-01 RX ADMIN — DEXMEDETOMIDINE HYDROCHLORIDE 1 MCG/KG/HR: 4 INJECTION, SOLUTION INTRAVENOUS at 20:25

## 2023-01-01 RX ADMIN — INSULIN DETEMIR 10 UNITS: 100 INJECTION, SOLUTION SUBCUTANEOUS at 08:08

## 2023-01-01 RX ADMIN — TAZOBACTAM SODIUM AND PIPERACILLIN SODIUM 4.5 G: 500; 4 INJECTION, SOLUTION INTRAVENOUS at 21:26

## 2023-01-01 RX ADMIN — DILTIAZEM HYDROCHLORIDE 90 MG: 90 TABLET, FILM COATED ORAL at 21:01

## 2023-01-01 RX ADMIN — GABAPENTIN 300 MG: 250 SOLUTION ORAL at 21:01

## 2023-01-01 RX ADMIN — IPRATROPIUM BROMIDE 0.5 MG: 0.5 SOLUTION RESPIRATORY (INHALATION) at 06:59

## 2023-01-01 RX ADMIN — SODIUM CHLORIDE, POTASSIUM CHLORIDE, SODIUM LACTATE AND CALCIUM CHLORIDE 100 ML/HR: 600; 310; 30; 20 INJECTION, SOLUTION INTRAVENOUS at 11:23

## 2023-01-01 RX ADMIN — PANTOPRAZOLE SODIUM 40 MG: 40 INJECTION, POWDER, FOR SOLUTION INTRAVENOUS at 05:35

## 2023-01-01 RX ADMIN — FUROSEMIDE 40 MG: 10 INJECTION, SOLUTION INTRAMUSCULAR; INTRAVENOUS at 08:00

## 2023-01-01 RX ADMIN — GABAPENTIN 300 MG: 300 CAPSULE ORAL at 16:55

## 2023-01-01 RX ADMIN — Medication 1 TABLET: at 08:13

## 2023-01-01 RX ADMIN — FUROSEMIDE 40 MG: 10 INJECTION, SOLUTION INTRAMUSCULAR; INTRAVENOUS at 08:06

## 2023-01-01 RX ADMIN — ENOXAPARIN SODIUM 120 MG: 150 INJECTION SUBCUTANEOUS at 09:58

## 2023-01-01 RX ADMIN — INSULIN LISPRO 8 UNITS: 100 INJECTION, SOLUTION INTRAVENOUS; SUBCUTANEOUS at 05:16

## 2023-01-01 RX ADMIN — METHYLNALTREXONE BROMIDE 12 MG: 12 INJECTION, SOLUTION SUBCUTANEOUS at 09:42

## 2023-01-01 RX ADMIN — POLYETHYLENE GLYCOL 3350 17 G: 17 POWDER, FOR SOLUTION ORAL at 08:09

## 2023-01-01 RX ADMIN — DEXMEDETOMIDINE HYDROCHLORIDE 0.8 MCG/KG/HR: 4 INJECTION, SOLUTION INTRAVENOUS at 16:11

## 2023-01-01 RX ADMIN — LEVETIRACETAM 500 MG: 500 TABLET, FILM COATED ORAL at 20:07

## 2023-01-01 RX ADMIN — DOCUSATE SODIUM 50 MG AND SENNOSIDES 8.6 MG 1 TABLET: 8.6; 5 TABLET, FILM COATED ORAL at 08:59

## 2023-01-01 RX ADMIN — IPRATROPIUM BROMIDE 0.5 MG: 0.5 SOLUTION RESPIRATORY (INHALATION) at 10:24

## 2023-01-01 RX ADMIN — FUROSEMIDE 40 MG: 10 INJECTION, SOLUTION INTRAVENOUS at 08:26

## 2023-01-01 RX ADMIN — METOCLOPRAMIDE 10 MG: 10 TABLET ORAL at 20:21

## 2023-01-01 RX ADMIN — ENOXAPARIN SODIUM 105 MG: 150 INJECTION SUBCUTANEOUS at 08:11

## 2023-01-01 RX ADMIN — EPINEPHRINE 1 MG: 0.1 INJECTION INTRACARDIAC; INTRAVENOUS at 07:51

## 2023-01-01 RX ADMIN — VANCOMYCIN HYDROCHLORIDE 750 MG: 1 INJECTION, POWDER, LYOPHILIZED, FOR SOLUTION INTRAVENOUS at 23:39

## 2023-01-01 RX ADMIN — VANCOMYCIN HYDROCHLORIDE 1000 MG: 1 INJECTION, POWDER, LYOPHILIZED, FOR SOLUTION INTRAVENOUS at 14:57

## 2023-01-01 RX ADMIN — Medication 10 ML: at 08:04

## 2023-01-01 RX ADMIN — GABAPENTIN 300 MG: 250 SOLUTION ORAL at 14:47

## 2023-01-01 RX ADMIN — IPRATROPIUM BROMIDE 0.5 MG: 0.5 SOLUTION RESPIRATORY (INHALATION) at 10:36

## 2023-01-01 RX ADMIN — PANTOPRAZOLE SODIUM 40 MG: 40 TABLET, DELAYED RELEASE ORAL at 05:51

## 2023-01-01 RX ADMIN — LEVETIRACETAM 500 MG: 500 TABLET, FILM COATED ORAL at 20:43

## 2023-01-01 RX ADMIN — FUROSEMIDE 40 MG: 10 INJECTION, SOLUTION INTRAMUSCULAR; INTRAVENOUS at 11:38

## 2023-01-01 RX ADMIN — EPINEPHRINE 1 MG: 0.1 INJECTION INTRACARDIAC; INTRAVENOUS at 07:43

## 2023-01-01 RX ADMIN — DILTIAZEM HYDROCHLORIDE 90 MG: 90 TABLET, FILM COATED ORAL at 15:07

## 2023-01-01 RX ADMIN — BUDESONIDE 0.5 MG: 0.5 INHALANT RESPIRATORY (INHALATION) at 06:38

## 2023-01-01 RX ADMIN — PROPOFOL 40 MCG/KG/MIN: 10 INJECTION, EMULSION INTRAVENOUS at 15:31

## 2023-01-01 RX ADMIN — ATORVASTATIN CALCIUM 20 MG: 10 TABLET, FILM COATED ORAL at 21:06

## 2023-01-01 RX ADMIN — IPRATROPIUM BROMIDE 0.5 MG: 0.5 SOLUTION RESPIRATORY (INHALATION) at 06:16

## 2023-01-01 RX ADMIN — DEXMEDETOMIDINE HYDROCHLORIDE 0.8 MCG/KG/HR: 4 INJECTION, SOLUTION INTRAVENOUS at 04:02

## 2023-01-01 RX ADMIN — BUDESONIDE 0.5 MG: 0.5 INHALANT RESPIRATORY (INHALATION) at 18:53

## 2023-01-01 RX ADMIN — CHLORHEXIDINE GLUCONATE 1 APPLICATION: 500 CLOTH TOPICAL at 03:09

## 2023-01-01 RX ADMIN — BUDESONIDE AND FORMOTEROL FUMARATE DIHYDRATE 2 PUFF: 160; 4.5 AEROSOL RESPIRATORY (INHALATION) at 19:57

## 2023-01-01 RX ADMIN — METOCLOPRAMIDE 10 MG: 10 TABLET ORAL at 16:37

## 2023-01-01 RX ADMIN — GABAPENTIN 300 MG: 300 CAPSULE ORAL at 09:00

## 2023-01-01 RX ADMIN — BUDESONIDE AND FORMOTEROL FUMARATE DIHYDRATE 2 PUFF: 160; 4.5 AEROSOL RESPIRATORY (INHALATION) at 18:53

## 2023-01-01 RX ADMIN — POLYETHYLENE GLYCOL 3350 17 G: 17 POWDER, FOR SOLUTION ORAL at 09:25

## 2023-01-01 RX ADMIN — Medication 1 TABLET: at 08:53

## 2023-01-01 RX ADMIN — Medication 10 ML: at 20:48

## 2023-01-01 RX ADMIN — INSULIN DETEMIR 25 UNITS: 100 INJECTION, SOLUTION SUBCUTANEOUS at 20:26

## 2023-01-01 RX ADMIN — TAZOBACTAM SODIUM AND PIPERACILLIN SODIUM 3.38 G: 375; 3 INJECTION, SOLUTION INTRAVENOUS at 16:41

## 2023-01-01 RX ADMIN — BUDESONIDE 0.5 MG: 0.5 INHALANT RESPIRATORY (INHALATION) at 18:33

## 2023-01-01 RX ADMIN — CHLORHEXIDINE GLUCONATE 1 APPLICATION: 500 CLOTH TOPICAL at 03:46

## 2023-01-01 RX ADMIN — PROPOFOL 25 MCG/KG/MIN: 10 INJECTION, EMULSION INTRAVENOUS at 12:20

## 2023-01-01 RX ADMIN — GABAPENTIN 300 MG: 300 CAPSULE ORAL at 16:12

## 2023-01-01 RX ADMIN — INSULIN LISPRO 3 UNITS: 100 INJECTION, SOLUTION INTRAVENOUS; SUBCUTANEOUS at 00:01

## 2023-01-01 RX ADMIN — ATORVASTATIN CALCIUM 20 MG: 10 TABLET, FILM COATED ORAL at 20:25

## 2023-01-01 RX ADMIN — ATORVASTATIN CALCIUM 20 MG: 10 TABLET, FILM COATED ORAL at 21:24

## 2023-01-01 RX ADMIN — GABAPENTIN 300 MG: 300 CAPSULE ORAL at 08:48

## 2023-01-01 RX ADMIN — ACETAMINOPHEN 650 MG: 650 SOLUTION ORAL at 22:59

## 2023-01-01 RX ADMIN — FUROSEMIDE 40 MG: 10 INJECTION, SOLUTION INTRAMUSCULAR; INTRAVENOUS at 08:55

## 2023-01-01 RX ADMIN — Medication 1 APPLICATION: at 09:58

## 2023-01-01 RX ADMIN — Medication 10 ML: at 20:05

## 2023-01-01 RX ADMIN — METHYLPREDNISOLONE SODIUM SUCCINATE 20 MG: 40 INJECTION, POWDER, FOR SOLUTION INTRAMUSCULAR; INTRAVENOUS at 17:00

## 2023-01-01 RX ADMIN — IPRATROPIUM BROMIDE 0.5 MG: 0.5 SOLUTION RESPIRATORY (INHALATION) at 06:20

## 2023-01-01 RX ADMIN — MAGNESIUM SULFATE HEPTAHYDRATE 1 G: 500 INJECTION, SOLUTION INTRAMUSCULAR; INTRAVENOUS at 07:49

## 2023-01-01 RX ADMIN — OXYCODONE HYDROCHLORIDE AND ACETAMINOPHEN 1 TABLET: 10; 325 TABLET ORAL at 19:50

## 2023-01-01 RX ADMIN — TAZOBACTAM SODIUM AND PIPERACILLIN SODIUM 3.38 G: 375; 3 INJECTION, SOLUTION INTRAVENOUS at 07:50

## 2023-01-01 RX ADMIN — GABAPENTIN 300 MG: 300 CAPSULE ORAL at 20:46

## 2023-01-01 RX ADMIN — DIGOXIN 125 MCG: 0.12 TABLET ORAL at 12:30

## 2023-01-01 RX ADMIN — GABAPENTIN 300 MG: 300 CAPSULE ORAL at 20:07

## 2023-01-01 RX ADMIN — INSULIN DETEMIR 15 UNITS: 100 INJECTION, SOLUTION SUBCUTANEOUS at 20:08

## 2023-01-01 RX ADMIN — GABAPENTIN 300 MG: 300 CAPSULE ORAL at 21:13

## 2023-01-01 RX ADMIN — SUCRALFATE 1 G: 1 TABLET ORAL at 07:01

## 2023-01-01 RX ADMIN — METOPROLOL TARTRATE 50 MG: 50 TABLET, FILM COATED ORAL at 09:03

## 2023-01-01 RX ADMIN — POLYETHYLENE GLYCOL 3350 17 G: 17 POWDER, FOR SOLUTION ORAL at 08:53

## 2023-01-01 RX ADMIN — BUDESONIDE AND FORMOTEROL FUMARATE DIHYDRATE 2 PUFF: 160; 4.5 AEROSOL RESPIRATORY (INHALATION) at 19:13

## 2023-01-01 RX ADMIN — DEXMEDETOMIDINE HYDROCHLORIDE 0.8 MCG/KG/HR: 4 INJECTION, SOLUTION INTRAVENOUS at 05:37

## 2023-01-01 RX ADMIN — LEVETIRACETAM 500 MG: 500 TABLET, FILM COATED ORAL at 09:04

## 2023-01-01 RX ADMIN — ASPIRIN 81 MG: 81 TABLET, CHEWABLE ORAL at 08:59

## 2023-01-01 RX ADMIN — BUDESONIDE 0.5 MG: 0.5 INHALANT RESPIRATORY (INHALATION) at 18:21

## 2023-01-01 RX ADMIN — GABAPENTIN 300 MG: 300 CAPSULE ORAL at 16:38

## 2023-01-01 RX ADMIN — GABAPENTIN 300 MG: 300 CAPSULE ORAL at 20:30

## 2023-01-01 RX ADMIN — INSULIN DETEMIR 10 UNITS: 100 INJECTION, SOLUTION SUBCUTANEOUS at 09:07

## 2023-01-01 RX ADMIN — LEVETIRACETAM 500 MG: 500 TABLET, FILM COATED ORAL at 20:32

## 2023-01-01 RX ADMIN — ATORVASTATIN CALCIUM 20 MG: 10 TABLET, FILM COATED ORAL at 20:20

## 2023-01-01 RX ADMIN — Medication 1 TABLET: at 10:20

## 2023-01-01 RX ADMIN — INSULIN DETEMIR 20 UNITS: 100 INJECTION, SOLUTION SUBCUTANEOUS at 09:19

## 2023-01-01 RX ADMIN — BUDESONIDE 0.5 MG: 0.5 INHALANT RESPIRATORY (INHALATION) at 06:37

## 2023-01-01 RX ADMIN — SUCRALFATE 1 G: 1 TABLET ORAL at 18:12

## 2023-01-01 RX ADMIN — POTASSIUM CHLORIDE 20 MEQ: 10 CAPSULE, COATED, EXTENDED RELEASE ORAL at 08:48

## 2023-01-01 RX ADMIN — PANTOPRAZOLE SODIUM 40 MG: 40 TABLET, DELAYED RELEASE ORAL at 05:38

## 2023-01-01 RX ADMIN — POTASSIUM CHLORIDE AND SODIUM CHLORIDE 250 ML/HR: 450; 150 INJECTION, SOLUTION INTRAVENOUS at 13:59

## 2023-01-01 RX ADMIN — OXYCODONE HYDROCHLORIDE AND ACETAMINOPHEN 1 TABLET: 10; 325 TABLET ORAL at 02:58

## 2023-01-01 RX ADMIN — POTASSIUM CHLORIDE, DEXTROSE MONOHYDRATE AND SODIUM CHLORIDE 150 ML/HR: 150; 5; 450 INJECTION, SOLUTION INTRAVENOUS at 17:58

## 2023-01-01 RX ADMIN — METOPROLOL TARTRATE 50 MG: 50 TABLET, FILM COATED ORAL at 08:05

## 2023-01-01 RX ADMIN — METOPROLOL TARTRATE 50 MG: 50 TABLET, FILM COATED ORAL at 20:28

## 2023-01-01 RX ADMIN — INSULIN DETEMIR 25 UNITS: 100 INJECTION, SOLUTION SUBCUTANEOUS at 08:43

## 2023-01-01 RX ADMIN — IPRATROPIUM BROMIDE 0.5 MG: 0.5 SOLUTION RESPIRATORY (INHALATION) at 10:14

## 2023-01-01 RX ADMIN — SUCRALFATE 1 G: 1 TABLET ORAL at 19:51

## 2023-01-01 RX ADMIN — IPRATROPIUM BROMIDE 0.5 MG: 0.5 SOLUTION RESPIRATORY (INHALATION) at 14:24

## 2023-01-01 RX ADMIN — MEROPENEM 1 G: 1 INJECTION, POWDER, FOR SOLUTION INTRAVENOUS at 14:45

## 2023-01-01 RX ADMIN — CHLORHEXIDINE GLUCONATE 1 APPLICATION: 500 CLOTH TOPICAL at 03:37

## 2023-01-01 RX ADMIN — OXYCODONE AND ACETAMINOPHEN 1 TABLET: 325; 10 TABLET ORAL at 01:55

## 2023-01-01 RX ADMIN — IPRATROPIUM BROMIDE 0.5 MG: 0.5 SOLUTION RESPIRATORY (INHALATION) at 06:04

## 2023-01-01 RX ADMIN — VANCOMYCIN HYDROCHLORIDE 1000 MG: 1 INJECTION, POWDER, LYOPHILIZED, FOR SOLUTION INTRAVENOUS at 05:15

## 2023-01-01 RX ADMIN — DILTIAZEM HYDROCHLORIDE 90 MG: 90 TABLET, FILM COATED ORAL at 22:18

## 2023-01-01 RX ADMIN — SUCRALFATE 1 G: 1 TABLET ORAL at 17:12

## 2023-01-01 RX ADMIN — METOCLOPRAMIDE 10 MG: 10 TABLET ORAL at 17:32

## 2023-01-01 RX ADMIN — METHYLNALTREXONE BROMIDE 12 MG: 12 INJECTION, SOLUTION SUBCUTANEOUS at 08:14

## 2023-01-01 RX ADMIN — POLYETHYLENE GLYCOL 3350 17 G: 17 POWDER, FOR SOLUTION ORAL at 08:05

## 2023-01-01 RX ADMIN — BUDESONIDE 0.5 MG: 0.5 INHALANT RESPIRATORY (INHALATION) at 19:19

## 2023-01-01 RX ADMIN — IPRATROPIUM BROMIDE 0.5 MG: 0.5 SOLUTION RESPIRATORY (INHALATION) at 18:14

## 2023-01-01 RX ADMIN — BUDESONIDE AND FORMOTEROL FUMARATE DIHYDRATE 2 PUFF: 160; 4.5 AEROSOL RESPIRATORY (INHALATION) at 06:13

## 2023-01-01 RX ADMIN — DIGOXIN 250 MCG: 250 TABLET ORAL at 12:26

## 2023-01-01 RX ADMIN — DILTIAZEM HYDROCHLORIDE 90 MG: 90 TABLET, FILM COATED ORAL at 23:03

## 2023-01-01 RX ADMIN — LEVOFLOXACIN 500 MG: 500 TABLET, FILM COATED ORAL at 08:55

## 2023-01-01 RX ADMIN — Medication 1 TABLET: at 10:46

## 2023-01-01 RX ADMIN — Medication 10 ML: at 21:42

## 2023-01-01 RX ADMIN — DILTIAZEM HYDROCHLORIDE 15 MG/HR: 5 INJECTION INTRAVENOUS at 11:19

## 2023-01-01 RX ADMIN — METOCLOPRAMIDE 10 MG: 10 TABLET ORAL at 21:24

## 2023-01-01 RX ADMIN — DOCUSATE SODIUM 50 MG AND SENNOSIDES 8.6 MG 1 TABLET: 8.6; 5 TABLET, FILM COATED ORAL at 09:00

## 2023-01-01 RX ADMIN — GABAPENTIN 300 MG: 250 SOLUTION ORAL at 22:15

## 2023-01-01 RX ADMIN — IPRATROPIUM BROMIDE 0.5 MG: 0.5 SOLUTION RESPIRATORY (INHALATION) at 06:27

## 2023-01-01 RX ADMIN — FUROSEMIDE 40 MG: 10 INJECTION, SOLUTION INTRAMUSCULAR; INTRAVENOUS at 17:51

## 2023-01-01 RX ADMIN — PERFLUTREN 9.78 MG: 6.52 INJECTION, SUSPENSION INTRAVENOUS at 08:50

## 2023-01-01 RX ADMIN — GABAPENTIN 300 MG: 250 SOLUTION ORAL at 21:16

## 2023-01-01 RX ADMIN — DILTIAZEM HYDROCHLORIDE 300 MG: 300 CAPSULE, COATED, EXTENDED RELEASE ORAL at 08:12

## 2023-01-01 RX ADMIN — METOPROLOL TARTRATE 50 MG: 50 TABLET, FILM COATED ORAL at 20:36

## 2023-01-01 RX ADMIN — ATORVASTATIN CALCIUM 20 MG: 10 TABLET, FILM COATED ORAL at 21:10

## 2023-01-01 RX ADMIN — IPRATROPIUM BROMIDE 0.5 MG: 0.5 SOLUTION RESPIRATORY (INHALATION) at 19:52

## 2023-01-01 RX ADMIN — ASPIRIN 81 MG: 81 TABLET, CHEWABLE ORAL at 08:10

## 2023-01-01 RX ADMIN — METOPROLOL TARTRATE 50 MG: 50 TABLET, FILM COATED ORAL at 20:59

## 2023-01-01 RX ADMIN — BUDESONIDE 0.5 MG: 0.5 INHALANT RESPIRATORY (INHALATION) at 18:43

## 2023-01-01 RX ADMIN — Medication 1 APPLICATION: at 09:44

## 2023-01-01 RX ADMIN — INSULIN DETEMIR 25 UNITS: 100 INJECTION, SOLUTION SUBCUTANEOUS at 20:38

## 2023-01-01 RX ADMIN — DOCUSATE SODIUM 50 MG AND SENNOSIDES 8.6 MG 1 TABLET: 8.6; 5 TABLET, FILM COATED ORAL at 08:12

## 2023-01-01 RX ADMIN — FUROSEMIDE 40 MG: 10 INJECTION, SOLUTION INTRAMUSCULAR; INTRAVENOUS at 08:53

## 2023-01-01 RX ADMIN — CHLORHEXIDINE GLUCONATE 1 APPLICATION: 500 CLOTH TOPICAL at 03:47

## 2023-01-01 RX ADMIN — FUROSEMIDE 40 MG: 10 INJECTION, SOLUTION INTRAVENOUS at 09:04

## 2023-01-01 RX ADMIN — POLYETHYLENE GLYCOL 3350 17 G: 17 POWDER, FOR SOLUTION ORAL at 09:00

## 2023-01-01 RX ADMIN — DOCUSATE SODIUM 50 MG AND SENNOSIDES 8.6 MG 1 TABLET: 8.6; 5 TABLET, FILM COATED ORAL at 08:06

## 2023-01-01 RX ADMIN — PROPOFOL 5 MCG/KG/MIN: 10 INJECTION, EMULSION INTRAVENOUS at 23:36

## 2023-01-01 RX ADMIN — POTASSIUM CHLORIDE 20 MEQ: 10 CAPSULE, COATED, EXTENDED RELEASE ORAL at 08:04

## 2023-01-01 RX ADMIN — Medication 10 ML: at 20:33

## 2023-01-01 RX ADMIN — DEXMEDETOMIDINE HYDROCHLORIDE 0.6 MCG/KG/HR: 4 INJECTION, SOLUTION INTRAVENOUS at 01:27

## 2023-01-01 RX ADMIN — POLYETHYLENE GLYCOL 3350 17 G: 17 POWDER, FOR SOLUTION ORAL at 08:48

## 2023-01-01 RX ADMIN — OXYCODONE AND ACETAMINOPHEN 1 TABLET: 325; 10 TABLET ORAL at 11:41

## 2023-01-01 RX ADMIN — METOCLOPRAMIDE 10 MG: 10 TABLET ORAL at 20:00

## 2023-01-01 RX ADMIN — SUCRALFATE 1 G: 1 TABLET ORAL at 20:25

## 2023-01-01 RX ADMIN — SUCRALFATE 1 G: 1 TABLET ORAL at 17:51

## 2023-01-01 RX ADMIN — DIGOXIN 250 MCG: 250 TABLET ORAL at 11:08

## 2023-01-01 RX ADMIN — PROPOFOL 45 MCG/KG/MIN: 10 INJECTION, EMULSION INTRAVENOUS at 23:57

## 2023-01-01 RX ADMIN — VANCOMYCIN HYDROCHLORIDE 1250 MG: 10 INJECTION, POWDER, LYOPHILIZED, FOR SOLUTION INTRAVENOUS at 05:20

## 2023-01-01 RX ADMIN — METOCLOPRAMIDE 10 MG: 10 TABLET ORAL at 20:59

## 2023-01-01 RX ADMIN — ATORVASTATIN CALCIUM 20 MG: 10 TABLET, FILM COATED ORAL at 20:07

## 2023-01-01 RX ADMIN — DOCUSATE SODIUM 50 MG AND SENNOSIDES 8.6 MG 1 TABLET: 8.6; 5 TABLET, FILM COATED ORAL at 09:04

## 2023-01-01 RX ADMIN — DILTIAZEM HYDROCHLORIDE 90 MG: 90 TABLET, FILM COATED ORAL at 21:10

## 2023-01-01 RX ADMIN — METOCLOPRAMIDE 10 MG: 10 TABLET ORAL at 16:58

## 2023-01-01 RX ADMIN — MEROPENEM 1 G: 1 INJECTION INTRAVENOUS at 16:55

## 2023-01-01 RX ADMIN — VANCOMYCIN HYDROCHLORIDE 1250 MG: 10 INJECTION, POWDER, LYOPHILIZED, FOR SOLUTION INTRAVENOUS at 14:47

## 2023-01-01 RX ADMIN — IPRATROPIUM BROMIDE 0.5 MG: 0.5 SOLUTION RESPIRATORY (INHALATION) at 18:34

## 2023-01-01 RX ADMIN — HEPARIN SODIUM 5000 UNITS: 5000 INJECTION, SOLUTION INTRAVENOUS; SUBCUTANEOUS at 20:30

## 2023-01-01 RX ADMIN — DILTIAZEM HYDROCHLORIDE 90 MG: 90 TABLET, FILM COATED ORAL at 07:01

## 2023-01-01 RX ADMIN — IPRATROPIUM BROMIDE 0.5 MG: 0.5 SOLUTION RESPIRATORY (INHALATION) at 07:11

## 2023-01-01 RX ADMIN — DILTIAZEM HYDROCHLORIDE 240 MG: 240 CAPSULE, EXTENDED RELEASE ORAL at 09:58

## 2023-01-01 RX ADMIN — SUCRALFATE 1 G: 1 TABLET ORAL at 21:29

## 2023-01-01 RX ADMIN — IPRATROPIUM BROMIDE 0.5 MG: 0.5 SOLUTION RESPIRATORY (INHALATION) at 14:29

## 2023-01-01 RX ADMIN — DILTIAZEM HYDROCHLORIDE 60 MG: 60 TABLET, FILM COATED ORAL at 23:44

## 2023-01-01 RX ADMIN — Medication 1 TABLET: at 08:11

## 2023-01-01 RX ADMIN — DIGOXIN 250 MCG: 250 TABLET ORAL at 11:20

## 2023-01-01 RX ADMIN — POLYETHYLENE GLYCOL 3350 17 G: 17 POWDER, FOR SOLUTION ORAL at 09:04

## 2023-01-01 RX ADMIN — VANCOMYCIN HYDROCHLORIDE 1000 MG: 1 INJECTION, POWDER, LYOPHILIZED, FOR SOLUTION INTRAVENOUS at 16:25

## 2023-01-01 RX ADMIN — BUTALBITAL, ACETAMINOPHEN, AND CAFFEINE 1 TABLET: 50; 325; 40 TABLET ORAL at 04:49

## 2023-01-01 RX ADMIN — SUCRALFATE 1 G: 1 TABLET ORAL at 12:26

## 2023-01-01 RX ADMIN — FUROSEMIDE 40 MG: 40 TABLET ORAL at 08:01

## 2023-01-01 RX ADMIN — DEXMEDETOMIDINE HYDROCHLORIDE 1.4 MCG/KG/HR: 4 INJECTION, SOLUTION INTRAVENOUS at 05:33

## 2023-01-01 RX ADMIN — METOCLOPRAMIDE 10 MG: 10 TABLET ORAL at 07:30

## 2023-01-01 RX ADMIN — METOPROLOL TARTRATE 50 MG: 50 TABLET, FILM COATED ORAL at 08:59

## 2023-01-01 RX ADMIN — MEROPENEM 1 G: 1 INJECTION, POWDER, FOR SOLUTION INTRAVENOUS at 13:31

## 2023-01-01 RX ADMIN — IPRATROPIUM BROMIDE 0.5 MG: 0.5 SOLUTION RESPIRATORY (INHALATION) at 10:46

## 2023-01-01 RX ADMIN — DILTIAZEM HYDROCHLORIDE 300 MG: 300 CAPSULE, COATED, EXTENDED RELEASE ORAL at 08:48

## 2023-01-01 RX ADMIN — BUDESONIDE AND FORMOTEROL FUMARATE DIHYDRATE 2 PUFF: 160; 4.5 AEROSOL RESPIRATORY (INHALATION) at 06:10

## 2023-01-01 RX ADMIN — INSULIN DETEMIR 25 UNITS: 100 INJECTION, SOLUTION SUBCUTANEOUS at 09:14

## 2023-01-01 RX ADMIN — INSULIN LISPRO 3 UNITS: 100 INJECTION, SOLUTION INTRAVENOUS; SUBCUTANEOUS at 17:57

## 2023-01-01 RX ADMIN — PROPOFOL 5 MCG/KG/MIN: 10 INJECTION, EMULSION INTRAVENOUS at 17:45

## 2023-01-01 RX ADMIN — PANTOPRAZOLE SODIUM 40 MG: 40 TABLET, DELAYED RELEASE ORAL at 06:11

## 2023-01-01 RX ADMIN — OXYCODONE HYDROCHLORIDE AND ACETAMINOPHEN 1 TABLET: 7.5; 325 TABLET ORAL at 13:50

## 2023-01-01 RX ADMIN — DILTIAZEM HYDROCHLORIDE 300 MG: 300 CAPSULE, COATED, EXTENDED RELEASE ORAL at 09:03

## 2023-01-01 RX ADMIN — DILTIAZEM HYDROCHLORIDE 240 MG: 240 CAPSULE, EXTENDED RELEASE ORAL at 12:02

## 2023-01-01 RX ADMIN — PANTOPRAZOLE SODIUM 40 MG: 40 TABLET, DELAYED RELEASE ORAL at 09:00

## 2023-01-01 RX ADMIN — MORPHINE SULFATE 5 MG: 4 INJECTION, SOLUTION INTRAMUSCULAR; INTRAVENOUS at 10:59

## 2023-01-01 RX ADMIN — Medication 10 ML: at 11:26

## 2023-01-01 RX ADMIN — ATORVASTATIN CALCIUM 20 MG: 10 TABLET, FILM COATED ORAL at 20:49

## 2023-01-01 RX ADMIN — DEXMEDETOMIDINE HYDROCHLORIDE 0.4 MCG/KG/HR: 4 INJECTION, SOLUTION INTRAVENOUS at 16:42

## 2023-01-01 RX ADMIN — INSULIN DETEMIR 15 UNITS: 100 INJECTION, SOLUTION SUBCUTANEOUS at 09:45

## 2023-01-01 RX ADMIN — DILTIAZEM HYDROCHLORIDE 10 MG/HR: 5 INJECTION INTRAVENOUS at 07:54

## 2023-01-01 RX ADMIN — SUCRALFATE 1 G: 1 TABLET ORAL at 10:19

## 2023-01-01 RX ADMIN — SODIUM CHLORIDE 7.5 UNITS/HR: 9 INJECTION, SOLUTION INTRAVENOUS at 11:50

## 2023-01-01 RX ADMIN — LEVETIRACETAM 500 MG: 500 TABLET, FILM COATED ORAL at 09:58

## 2023-01-01 RX ADMIN — SUCRALFATE 1 G: 1 TABLET ORAL at 20:00

## 2023-01-01 RX ADMIN — DILTIAZEM HYDROCHLORIDE 90 MG: 90 TABLET, FILM COATED ORAL at 05:14

## 2023-01-01 RX ADMIN — INSULIN LISPRO 3 UNITS: 100 INJECTION, SOLUTION INTRAVENOUS; SUBCUTANEOUS at 05:37

## 2023-01-01 RX ADMIN — DILTIAZEM HYDROCHLORIDE 90 MG: 90 TABLET, FILM COATED ORAL at 06:12

## 2023-01-01 RX ADMIN — MEROPENEM 1 G: 1 INJECTION, POWDER, FOR SOLUTION INTRAVENOUS at 05:59

## 2023-01-01 RX ADMIN — METOPROLOL TARTRATE 50 MG: 50 TABLET, FILM COATED ORAL at 21:21

## 2023-01-01 RX ADMIN — METOPROLOL TARTRATE 50 MG: 50 TABLET, FILM COATED ORAL at 20:01

## 2023-01-01 RX ADMIN — FUROSEMIDE 40 MG: 10 INJECTION, SOLUTION INTRAMUSCULAR; INTRAVENOUS at 17:18

## 2023-01-01 RX ADMIN — INSULIN LISPRO 3 UNITS: 100 INJECTION, SOLUTION INTRAVENOUS; SUBCUTANEOUS at 07:58

## 2023-01-01 RX ADMIN — INSULIN LISPRO 3 UNITS: 100 INJECTION, SOLUTION INTRAVENOUS; SUBCUTANEOUS at 17:29

## 2023-01-01 RX ADMIN — FUROSEMIDE 40 MG: 10 INJECTION, SOLUTION INTRAMUSCULAR; INTRAVENOUS at 08:43

## 2023-01-01 RX ADMIN — DILTIAZEM HYDROCHLORIDE 90 MG: 90 TABLET, FILM COATED ORAL at 13:31

## 2023-01-01 RX ADMIN — METOCLOPRAMIDE 10 MG: 10 TABLET ORAL at 20:42

## 2023-01-01 RX ADMIN — MEROPENEM 1 G: 1 INJECTION, POWDER, FOR SOLUTION INTRAVENOUS at 15:14

## 2023-01-01 RX ADMIN — METOPROLOL TARTRATE 50 MG: 50 TABLET, FILM COATED ORAL at 08:12

## 2023-01-01 RX ADMIN — TAZOBACTAM SODIUM AND PIPERACILLIN SODIUM 3.38 G: 375; 3 INJECTION, SOLUTION INTRAVENOUS at 11:50

## 2023-01-01 RX ADMIN — DILTIAZEM HYDROCHLORIDE 90 MG: 90 TABLET, FILM COATED ORAL at 06:19

## 2023-01-01 RX ADMIN — INSULIN DETEMIR 30 UNITS: 100 INJECTION, SOLUTION SUBCUTANEOUS at 09:00

## 2023-01-01 RX ADMIN — TAZOBACTAM SODIUM AND PIPERACILLIN SODIUM 4.5 G: 500; 4 INJECTION, SOLUTION INTRAVENOUS at 14:00

## 2023-01-01 RX ADMIN — IPRATROPIUM BROMIDE 0.5 MG: 0.5 SOLUTION RESPIRATORY (INHALATION) at 19:13

## 2023-01-01 RX ADMIN — INSULIN DETEMIR 25 UNITS: 100 INJECTION, SOLUTION SUBCUTANEOUS at 09:31

## 2023-01-01 RX ADMIN — SUCRALFATE 1 G: 1 TABLET ORAL at 16:55

## 2023-01-01 RX ADMIN — FUROSEMIDE 20 MG: 20 TABLET ORAL at 09:58

## 2023-01-01 RX ADMIN — IPRATROPIUM BROMIDE 0.5 MG: 0.5 SOLUTION RESPIRATORY (INHALATION) at 07:01

## 2023-01-01 RX ADMIN — GABAPENTIN 300 MG: 300 CAPSULE ORAL at 09:24

## 2023-01-01 RX ADMIN — GABAPENTIN 300 MG: 300 CAPSULE ORAL at 16:54

## 2023-01-01 RX ADMIN — SUCRALFATE 1 G: 1 TABLET ORAL at 18:02

## 2023-01-01 RX ADMIN — CHLORHEXIDINE GLUCONATE 1 APPLICATION: 500 CLOTH TOPICAL at 03:54

## 2023-01-01 RX ADMIN — METOPROLOL TARTRATE 50 MG: 50 TABLET, FILM COATED ORAL at 08:13

## 2023-01-01 RX ADMIN — MORPHINE SULFATE 4 MG: 4 INJECTION, SOLUTION INTRAMUSCULAR; INTRAVENOUS at 06:15

## 2023-01-01 RX ADMIN — OXYCODONE HYDROCHLORIDE AND ACETAMINOPHEN 1 TABLET: 10; 325 TABLET ORAL at 20:21

## 2023-01-01 RX ADMIN — BUDESONIDE 0.5 MG: 0.5 INHALANT RESPIRATORY (INHALATION) at 06:10

## 2023-01-01 RX ADMIN — METOCLOPRAMIDE 10 MG: 10 TABLET ORAL at 12:29

## 2023-01-01 RX ADMIN — METOPROLOL TARTRATE 25 MG: 25 TABLET, FILM COATED ORAL at 09:46

## 2023-01-01 RX ADMIN — LEVETIRACETAM 500 MG: 500 TABLET, FILM COATED ORAL at 21:06

## 2023-01-01 RX ADMIN — CHLORHEXIDINE GLUCONATE 1 APPLICATION: 500 CLOTH TOPICAL at 04:14

## 2023-01-01 RX ADMIN — TAZOBACTAM SODIUM AND PIPERACILLIN SODIUM 3.38 G: 375; 3 INJECTION, SOLUTION INTRAVENOUS at 16:17

## 2023-01-01 RX ADMIN — MEROPENEM 1 G: 1 INJECTION, POWDER, FOR SOLUTION INTRAVENOUS at 22:08

## 2023-01-01 RX ADMIN — IPRATROPIUM BROMIDE 0.5 MG: 0.5 SOLUTION RESPIRATORY (INHALATION) at 15:31

## 2023-01-01 RX ADMIN — ASPIRIN 81 MG: 81 TABLET, CHEWABLE ORAL at 09:50

## 2023-01-01 RX ADMIN — CHLORHEXIDINE GLUCONATE 1 APPLICATION: 500 CLOTH TOPICAL at 03:33

## 2023-01-01 RX ADMIN — MEROPENEM 1 G: 1 INJECTION, POWDER, FOR SOLUTION INTRAVENOUS at 22:26

## 2023-01-01 RX ADMIN — POLYETHYLENE GLYCOL 3350 17 G: 17 POWDER, FOR SOLUTION ORAL at 09:18

## 2023-01-01 RX ADMIN — PERFLUTREN 13.04 MG: 6.52 INJECTION, SUSPENSION INTRAVENOUS at 16:19

## 2023-01-01 RX ADMIN — GABAPENTIN 300 MG: 300 CAPSULE ORAL at 09:50

## 2023-01-01 RX ADMIN — ATORVASTATIN CALCIUM 20 MG: 10 TABLET, FILM COATED ORAL at 20:03

## 2023-01-01 RX ADMIN — LEVETIRACETAM 500 MG: 500 TABLET, FILM COATED ORAL at 20:01

## 2023-01-01 RX ADMIN — TAZOBACTAM SODIUM AND PIPERACILLIN SODIUM 3.38 G: 375; 3 INJECTION, SOLUTION INTRAVENOUS at 08:04

## 2023-01-01 RX ADMIN — FUROSEMIDE 40 MG: 10 INJECTION, SOLUTION INTRAMUSCULAR; INTRAVENOUS at 17:48

## 2023-01-01 RX ADMIN — MORPHINE SULFATE 5 MG: 4 INJECTION, SOLUTION INTRAMUSCULAR; INTRAVENOUS at 20:27

## 2023-01-01 RX ADMIN — METHYLNALTREXONE BROMIDE 12 MG: 12 INJECTION, SOLUTION SUBCUTANEOUS at 10:20

## 2023-01-01 RX ADMIN — Medication 10 ML: at 20:39

## 2023-01-01 RX ADMIN — IPRATROPIUM BROMIDE 0.5 MG: 0.5 SOLUTION RESPIRATORY (INHALATION) at 18:56

## 2023-01-01 RX ADMIN — Medication 10 ML: at 20:59

## 2023-01-01 RX ADMIN — PANTOPRAZOLE SODIUM 40 MG: 40 INJECTION, POWDER, FOR SOLUTION INTRAVENOUS at 06:31

## 2023-01-01 RX ADMIN — FUROSEMIDE 40 MG: 40 TABLET ORAL at 08:04

## 2023-01-01 RX ADMIN — Medication 1 TABLET: at 09:18

## 2023-01-01 RX ADMIN — ATORVASTATIN CALCIUM 20 MG: 10 TABLET, FILM COATED ORAL at 20:59

## 2023-01-01 RX ADMIN — FUROSEMIDE 40 MG: 40 TABLET ORAL at 09:03

## 2023-01-01 RX ADMIN — GABAPENTIN 300 MG: 300 CAPSULE ORAL at 20:20

## 2023-01-01 RX ADMIN — OXYCODONE AND ACETAMINOPHEN 1 TABLET: 325; 10 TABLET ORAL at 23:59

## 2023-01-01 RX ADMIN — PANTOPRAZOLE SODIUM 40 MG: 40 INJECTION, POWDER, FOR SOLUTION INTRAVENOUS at 05:14

## 2023-01-01 RX ADMIN — IPRATROPIUM BROMIDE 0.5 MG: 0.5 SOLUTION RESPIRATORY (INHALATION) at 14:16

## 2023-01-01 RX ADMIN — ENOXAPARIN SODIUM 120 MG: 150 INJECTION SUBCUTANEOUS at 08:00

## 2023-01-01 RX ADMIN — ZIPRASIDONE MESYLATE 20 MG: 20 INJECTION, POWDER, LYOPHILIZED, FOR SOLUTION INTRAMUSCULAR at 18:03

## 2023-01-01 RX ADMIN — METOCLOPRAMIDE 10 MG: 10 TABLET ORAL at 20:18

## 2023-01-01 RX ADMIN — ASPIRIN 81 MG: 81 TABLET, CHEWABLE ORAL at 08:06

## 2023-01-01 RX ADMIN — BUDESONIDE AND FORMOTEROL FUMARATE DIHYDRATE 2 PUFF: 160; 4.5 AEROSOL RESPIRATORY (INHALATION) at 19:02

## 2023-01-01 RX ADMIN — FUROSEMIDE 20 MG: 20 TABLET ORAL at 08:11

## 2023-01-01 RX ADMIN — DILTIAZEM HYDROCHLORIDE 90 MG: 90 TABLET, FILM COATED ORAL at 05:35

## 2023-01-01 RX ADMIN — METOPROLOL TARTRATE 50 MG: 50 TABLET, FILM COATED ORAL at 20:44

## 2023-01-01 RX ADMIN — Medication 10 ML: at 20:36

## 2023-01-01 RX ADMIN — FUROSEMIDE 40 MG: 10 INJECTION, SOLUTION INTRAMUSCULAR; INTRAVENOUS at 08:03

## 2023-01-01 RX ADMIN — DOCUSATE SODIUM 50 MG AND SENNOSIDES 8.6 MG 1 TABLET: 8.6; 5 TABLET, FILM COATED ORAL at 08:13

## 2023-01-01 RX ADMIN — VANCOMYCIN HYDROCHLORIDE 750 MG: 1 INJECTION, POWDER, LYOPHILIZED, FOR SOLUTION INTRAVENOUS at 23:03

## 2023-01-01 RX ADMIN — POLYETHYLENE GLYCOL 3350 17 G: 17 POWDER, FOR SOLUTION ORAL at 08:13

## 2023-01-01 RX ADMIN — Medication 10 ML: at 20:03

## 2023-01-01 RX ADMIN — ENOXAPARIN SODIUM 120 MG: 150 INJECTION SUBCUTANEOUS at 09:49

## 2023-01-01 RX ADMIN — OXYCODONE HYDROCHLORIDE AND ACETAMINOPHEN 1 TABLET: 10; 325 TABLET ORAL at 18:18

## 2023-01-01 RX ADMIN — DILTIAZEM HYDROCHLORIDE 90 MG: 90 TABLET, FILM COATED ORAL at 17:27

## 2023-01-01 RX ADMIN — Medication 10 ML: at 21:17

## 2023-01-01 RX ADMIN — DILTIAZEM HYDROCHLORIDE 90 MG: 90 TABLET, FILM COATED ORAL at 21:05

## 2023-01-01 RX ADMIN — IPRATROPIUM BROMIDE 0.5 MG: 0.5 SOLUTION RESPIRATORY (INHALATION) at 10:30

## 2023-01-01 RX ADMIN — Medication 10 ML: at 09:05

## 2023-01-01 RX ADMIN — BUDESONIDE AND FORMOTEROL FUMARATE DIHYDRATE 2 PUFF: 160; 4.5 AEROSOL RESPIRATORY (INHALATION) at 06:27

## 2023-01-01 RX ADMIN — BUDESONIDE 0.5 MG: 0.5 INHALANT RESPIRATORY (INHALATION) at 07:17

## 2023-01-01 RX ADMIN — LEVOFLOXACIN 500 MG: 500 TABLET, FILM COATED ORAL at 08:13

## 2023-01-01 RX ADMIN — IPRATROPIUM BROMIDE 0.5 MG: 0.5 SOLUTION RESPIRATORY (INHALATION) at 18:21

## 2023-01-01 RX ADMIN — LEVOFLOXACIN 500 MG: 500 TABLET, FILM COATED ORAL at 08:03

## 2023-01-01 RX ADMIN — DEXMEDETOMIDINE HYDROCHLORIDE 0.5 MCG/KG/HR: 4 INJECTION, SOLUTION INTRAVENOUS at 15:19

## 2023-01-01 RX ADMIN — INSULIN LISPRO 10 UNITS: 100 INJECTION, SOLUTION INTRAVENOUS; SUBCUTANEOUS at 17:26

## 2023-01-01 RX ADMIN — DEXMEDETOMIDINE HYDROCHLORIDE 1 MCG/KG/HR: 4 INJECTION, SOLUTION INTRAVENOUS at 12:41

## 2023-01-01 RX ADMIN — CHLORHEXIDINE GLUCONATE 1 APPLICATION: 500 CLOTH TOPICAL at 20:08

## 2023-01-01 RX ADMIN — INSULIN LISPRO 5 UNITS: 100 INJECTION, SOLUTION INTRAVENOUS; SUBCUTANEOUS at 05:29

## 2023-01-01 RX ADMIN — Medication 10 ML: at 08:55

## 2023-01-01 RX ADMIN — MEROPENEM 1 G: 1 INJECTION, POWDER, FOR SOLUTION INTRAVENOUS at 06:21

## 2023-01-01 RX ADMIN — DILTIAZEM HYDROCHLORIDE 5 MG/HR: 5 INJECTION INTRAVENOUS at 03:14

## 2023-01-01 RX ADMIN — PANTOPRAZOLE SODIUM 40 MG: 40 TABLET, DELAYED RELEASE ORAL at 05:17

## 2023-01-01 RX ADMIN — GABAPENTIN 300 MG: 300 CAPSULE ORAL at 20:21

## 2023-01-01 RX ADMIN — DIGOXIN 250 MCG: 250 TABLET ORAL at 09:26

## 2023-01-01 RX ADMIN — MEROPENEM 1 G: 1 INJECTION, POWDER, FOR SOLUTION INTRAVENOUS at 14:56

## 2023-01-01 RX ADMIN — LEVETIRACETAM 500 MG: 500 TABLET, FILM COATED ORAL at 21:09

## 2023-01-01 RX ADMIN — PROPOFOL 50 MCG/KG/MIN: 10 INJECTION, EMULSION INTRAVENOUS at 05:27

## 2023-01-01 RX ADMIN — ATORVASTATIN CALCIUM 20 MG: 10 TABLET, FILM COATED ORAL at 20:42

## 2023-01-01 RX ADMIN — DIGOXIN 250 MCG: 250 TABLET ORAL at 12:09

## 2023-01-01 RX ADMIN — VANCOMYCIN HYDROCHLORIDE 750 MG: 1 INJECTION, POWDER, LYOPHILIZED, FOR SOLUTION INTRAVENOUS at 11:33

## 2023-01-01 RX ADMIN — Medication 10 ML: at 21:14

## 2023-01-01 RX ADMIN — DOCUSATE SODIUM 50 MG AND SENNOSIDES 8.6 MG 1 TABLET: 8.6; 5 TABLET, FILM COATED ORAL at 08:53

## 2023-01-01 RX ADMIN — METOPROLOL TARTRATE 50 MG: 50 TABLET, FILM COATED ORAL at 09:18

## 2023-01-01 RX ADMIN — METOPROLOL TARTRATE 50 MG: 50 TABLET, FILM COATED ORAL at 09:50

## 2023-01-01 RX ADMIN — INSULIN LISPRO 3 UNITS: 100 INJECTION, SOLUTION INTRAVENOUS; SUBCUTANEOUS at 12:07

## 2023-01-01 RX ADMIN — Medication 10 ML: at 08:56

## 2023-01-01 RX ADMIN — METHYLNALTREXONE BROMIDE 12 MG: 12 INJECTION, SOLUTION SUBCUTANEOUS at 09:03

## 2023-01-01 RX ADMIN — Medication 10 ML: at 08:07

## 2023-01-01 RX ADMIN — INSULIN LISPRO 10 UNITS: 100 INJECTION, SOLUTION INTRAVENOUS; SUBCUTANEOUS at 12:12

## 2023-01-01 RX ADMIN — INSULIN LISPRO 5 UNITS: 100 INJECTION, SOLUTION INTRAVENOUS; SUBCUTANEOUS at 06:31

## 2023-01-01 RX ADMIN — METOCLOPRAMIDE 10 MG: 10 TABLET ORAL at 12:08

## 2023-01-01 RX ADMIN — METOPROLOL TARTRATE 50 MG: 50 TABLET, FILM COATED ORAL at 21:09

## 2023-01-01 RX ADMIN — INSULIN DETEMIR 10 UNITS: 100 INJECTION, SOLUTION SUBCUTANEOUS at 20:02

## 2023-01-01 RX ADMIN — METOPROLOL TARTRATE 50 MG: 50 TABLET, FILM COATED ORAL at 21:13

## 2023-01-01 RX ADMIN — DIGOXIN 250 MCG: 250 TABLET ORAL at 11:55

## 2023-01-01 RX ADMIN — PROPOFOL 20 MCG/KG/MIN: 10 INJECTION, EMULSION INTRAVENOUS at 05:41

## 2023-01-01 RX ADMIN — DILTIAZEM HYDROCHLORIDE 90 MG: 90 TABLET, FILM COATED ORAL at 15:46

## 2023-01-01 RX ADMIN — DILTIAZEM HYDROCHLORIDE 240 MG: 240 CAPSULE, EXTENDED RELEASE ORAL at 08:05

## 2023-01-01 RX ADMIN — SUCRALFATE 1 G: 1 TABLET ORAL at 21:24

## 2023-01-01 RX ADMIN — Medication 10 ML: at 22:03

## 2023-01-01 RX ADMIN — ASPIRIN 81 MG: 81 TABLET, CHEWABLE ORAL at 09:46

## 2023-01-01 RX ADMIN — FUROSEMIDE 40 MG: 40 TABLET ORAL at 09:00

## 2023-01-01 RX ADMIN — METOPROLOL TARTRATE 50 MG: 50 TABLET, FILM COATED ORAL at 20:02

## 2023-01-01 RX ADMIN — PROPOFOL 5 MCG/KG/MIN: 10 INJECTION, EMULSION INTRAVENOUS at 23:45

## 2023-01-01 RX ADMIN — ENOXAPARIN SODIUM 120 MG: 150 INJECTION SUBCUTANEOUS at 20:53

## 2023-01-01 RX ADMIN — DILTIAZEM HYDROCHLORIDE 90 MG: 90 TABLET, FILM COATED ORAL at 06:31

## 2023-01-01 RX ADMIN — SODIUM BICARBONATE 50 MEQ: 84 INJECTION INTRAVENOUS at 07:44

## 2023-01-01 RX ADMIN — Medication 0.04 MCG/KG/MIN: at 03:07

## 2023-01-01 RX ADMIN — FUROSEMIDE 40 MG: 10 INJECTION, SOLUTION INTRAVENOUS at 09:34

## 2023-01-01 RX ADMIN — DOCUSATE SODIUM 50 MG AND SENNOSIDES 8.6 MG 1 TABLET: 8.6; 5 TABLET, FILM COATED ORAL at 10:20

## 2023-01-01 RX ADMIN — Medication 1 APPLICATION: at 08:59

## 2023-01-01 RX ADMIN — FUROSEMIDE 40 MG: 40 TABLET ORAL at 08:11

## 2023-01-01 RX ADMIN — BUDESONIDE AND FORMOTEROL FUMARATE DIHYDRATE 2 PUFF: 160; 4.5 AEROSOL RESPIRATORY (INHALATION) at 06:56

## 2023-01-01 RX ADMIN — METOCLOPRAMIDE 10 MG: 10 TABLET ORAL at 11:32

## 2023-01-01 RX ADMIN — Medication 10 ML: at 08:24

## 2023-01-01 RX ADMIN — ATORVASTATIN CALCIUM 20 MG: 10 TABLET, FILM COATED ORAL at 20:30

## 2023-01-01 RX ADMIN — SUCRALFATE 1 G: 1 TABLET ORAL at 21:21

## 2023-01-01 RX ADMIN — ATORVASTATIN CALCIUM 20 MG: 10 TABLET, FILM COATED ORAL at 21:16

## 2023-01-01 RX ADMIN — FUROSEMIDE 40 MG: 40 TABLET ORAL at 08:12

## 2023-01-01 RX ADMIN — METOCLOPRAMIDE 10 MG: 10 TABLET ORAL at 20:50

## 2023-01-01 RX ADMIN — LEVETIRACETAM 500 MG: 500 TABLET, FILM COATED ORAL at 21:21

## 2023-01-01 RX ADMIN — ASPIRIN 81 MG: 81 TABLET, CHEWABLE ORAL at 09:04

## 2023-01-01 RX ADMIN — IPRATROPIUM BROMIDE 0.5 MG: 0.5 SOLUTION RESPIRATORY (INHALATION) at 06:39

## 2023-01-01 RX ADMIN — LORAZEPAM 1 MG: 2 INJECTION INTRAMUSCULAR; INTRAVENOUS at 17:08

## 2023-01-01 RX ADMIN — GABAPENTIN 300 MG: 300 CAPSULE ORAL at 20:03

## 2023-01-01 RX ADMIN — MEROPENEM 1 G: 1 INJECTION, POWDER, FOR SOLUTION INTRAVENOUS at 05:18

## 2023-01-01 RX ADMIN — INSULIN LISPRO 3 UNITS: 100 INJECTION, SOLUTION INTRAVENOUS; SUBCUTANEOUS at 11:42

## 2023-01-01 RX ADMIN — METOCLOPRAMIDE 10 MG: 10 TABLET ORAL at 07:50

## 2023-01-01 RX ADMIN — FUROSEMIDE 40 MG: 10 INJECTION, SOLUTION INTRAMUSCULAR; INTRAVENOUS at 08:07

## 2023-01-01 RX ADMIN — LEVETIRACETAM 500 MG: 500 TABLET, FILM COATED ORAL at 08:10

## 2023-01-01 RX ADMIN — OXYCODONE AND ACETAMINOPHEN 1 TABLET: 325; 10 TABLET ORAL at 12:11

## 2023-01-01 RX ADMIN — METOCLOPRAMIDE 10 MG: 10 TABLET ORAL at 20:26

## 2023-01-01 RX ADMIN — MORPHINE SULFATE 5 MG: 4 INJECTION, SOLUTION INTRAMUSCULAR; INTRAVENOUS at 09:02

## 2023-01-01 RX ADMIN — IPRATROPIUM BROMIDE 0.5 MG: 0.5 SOLUTION RESPIRATORY (INHALATION) at 10:11

## 2023-01-01 RX ADMIN — GABAPENTIN 300 MG: 250 SOLUTION ORAL at 05:23

## 2023-01-01 RX ADMIN — METOCLOPRAMIDE 10 MG: 10 TABLET ORAL at 07:46

## 2023-01-01 RX ADMIN — Medication 1 TABLET: at 09:46

## 2023-01-01 RX ADMIN — MEROPENEM 1 G: 1 INJECTION, POWDER, FOR SOLUTION INTRAVENOUS at 21:11

## 2023-01-01 RX ADMIN — ASPIRIN 81 MG: 81 TABLET, CHEWABLE ORAL at 08:11

## 2023-01-01 RX ADMIN — GABAPENTIN 300 MG: 250 SOLUTION ORAL at 14:55

## 2023-01-01 RX ADMIN — ASPIRIN 81 MG: 81 TABLET, CHEWABLE ORAL at 08:55

## 2023-01-01 RX ADMIN — VANCOMYCIN HYDROCHLORIDE 750 MG: 1 INJECTION, POWDER, LYOPHILIZED, FOR SOLUTION INTRAVENOUS at 11:21

## 2023-01-01 RX ADMIN — BUDESONIDE 0.5 MG: 0.5 INHALANT RESPIRATORY (INHALATION) at 06:16

## 2023-01-01 RX ADMIN — MEROPENEM 1 G: 1 INJECTION, POWDER, FOR SOLUTION INTRAVENOUS at 06:55

## 2023-01-01 RX ADMIN — POLYETHYLENE GLYCOL 3350 17 G: 17 POWDER, FOR SOLUTION ORAL at 09:46

## 2023-01-01 RX ADMIN — GABAPENTIN 300 MG: 300 CAPSULE ORAL at 16:50

## 2023-01-01 RX ADMIN — DIGOXIN 250 MCG: 250 TABLET ORAL at 11:33

## 2023-01-01 RX ADMIN — Medication 1 TABLET: at 08:03

## 2023-01-01 RX ADMIN — METOCLOPRAMIDE 10 MG: 10 TABLET ORAL at 21:21

## 2023-01-01 RX ADMIN — Medication 10 ML: at 09:21

## 2023-01-01 RX ADMIN — DOCUSATE SODIUM 50 MG AND SENNOSIDES 8.6 MG 1 TABLET: 8.6; 5 TABLET, FILM COATED ORAL at 08:03

## 2023-01-01 RX ADMIN — METOCLOPRAMIDE 10 MG: 10 TABLET ORAL at 17:12

## 2023-01-01 RX ADMIN — ASPIRIN 81 MG: 81 TABLET, CHEWABLE ORAL at 09:02

## 2023-01-01 RX ADMIN — Medication 10 ML: at 08:02

## 2023-01-01 RX ADMIN — LEVETIRACETAM 500 MG: 500 TABLET, FILM COATED ORAL at 21:13

## 2023-01-01 RX ADMIN — BUDESONIDE 0.5 MG: 0.5 INHALANT RESPIRATORY (INHALATION) at 06:20

## 2023-01-01 RX ADMIN — LEVETIRACETAM 500 MG: 500 TABLET, FILM COATED ORAL at 08:54

## 2023-01-01 RX ADMIN — POTASSIUM CHLORIDE 20 MEQ: 10 CAPSULE, COATED, EXTENDED RELEASE ORAL at 08:58

## 2023-01-01 RX ADMIN — GABAPENTIN 300 MG: 300 CAPSULE ORAL at 16:02

## 2023-01-01 RX ADMIN — POTASSIUM CHLORIDE 20 MEQ: 10 CAPSULE, COATED, EXTENDED RELEASE ORAL at 08:10

## 2023-01-01 RX ADMIN — GABAPENTIN 300 MG: 250 SOLUTION ORAL at 05:25

## 2023-01-01 RX ADMIN — ASPIRIN 81 MG: 81 TABLET, CHEWABLE ORAL at 08:09

## 2023-01-01 RX ADMIN — Medication 10 ML: at 05:42

## 2023-01-01 RX ADMIN — ATORVASTATIN CALCIUM 20 MG: 10 TABLET, FILM COATED ORAL at 20:00

## 2023-01-01 RX ADMIN — INSULIN DETEMIR 10 UNITS: 100 INJECTION, SOLUTION SUBCUTANEOUS at 19:51

## 2023-01-01 RX ADMIN — VANCOMYCIN HYDROCHLORIDE 1000 MG: 1 INJECTION, POWDER, LYOPHILIZED, FOR SOLUTION INTRAVENOUS at 17:50

## 2023-01-01 RX ADMIN — PANTOPRAZOLE SODIUM 40 MG: 40 INJECTION, POWDER, FOR SOLUTION INTRAVENOUS at 06:10

## 2023-01-01 RX ADMIN — ONDANSETRON 4 MG: 2 INJECTION INTRAMUSCULAR; INTRAVENOUS at 01:21

## 2023-01-01 RX ADMIN — METOPROLOL TARTRATE 25 MG: 25 TABLET, FILM COATED ORAL at 20:01

## 2023-01-01 RX ADMIN — HYDROMORPHONE HYDROCHLORIDE 0.5 MG: 1 INJECTION, SOLUTION INTRAMUSCULAR; INTRAVENOUS; SUBCUTANEOUS at 15:43

## 2023-01-01 RX ADMIN — VANCOMYCIN HYDROCHLORIDE 750 MG: 1 INJECTION, POWDER, LYOPHILIZED, FOR SOLUTION INTRAVENOUS at 23:40

## 2023-01-01 RX ADMIN — OXYCODONE AND ACETAMINOPHEN 1 TABLET: 325; 10 TABLET ORAL at 19:58

## 2023-01-01 RX ADMIN — LEVETIRACETAM 500 MG: 500 TABLET, FILM COATED ORAL at 20:21

## 2023-01-01 RX ADMIN — INSULIN LISPRO 3 UNITS: 100 INJECTION, SOLUTION INTRAVENOUS; SUBCUTANEOUS at 07:51

## 2023-01-01 RX ADMIN — INSULIN LISPRO 3 UNITS: 100 INJECTION, SOLUTION INTRAVENOUS; SUBCUTANEOUS at 12:08

## 2023-01-01 RX ADMIN — FUROSEMIDE 40 MG: 10 INJECTION, SOLUTION INTRAMUSCULAR; INTRAVENOUS at 08:10

## 2023-01-01 RX ADMIN — BUTALBITAL, ACETAMINOPHEN, AND CAFFEINE 1 TABLET: 50; 325; 40 TABLET ORAL at 03:27

## 2023-01-01 RX ADMIN — PROPOFOL 40 MCG/KG/MIN: 10 INJECTION, EMULSION INTRAVENOUS at 18:54

## 2023-01-01 RX ADMIN — ENOXAPARIN SODIUM 120 MG: 150 INJECTION SUBCUTANEOUS at 20:02

## 2023-01-01 RX ADMIN — IPRATROPIUM BROMIDE AND ALBUTEROL SULFATE 3 ML: 2.5; .5 SOLUTION RESPIRATORY (INHALATION) at 17:09

## 2023-01-01 RX ADMIN — GABAPENTIN 300 MG: 250 SOLUTION ORAL at 06:11

## 2023-01-01 RX ADMIN — POLYETHYLENE GLYCOL 3350 17 G: 17 POWDER, FOR SOLUTION ORAL at 09:41

## 2023-01-01 RX ADMIN — VANCOMYCIN HYDROCHLORIDE 1000 MG: 1 INJECTION, POWDER, LYOPHILIZED, FOR SOLUTION INTRAVENOUS at 16:56

## 2023-01-01 RX ADMIN — CHLORHEXIDINE GLUCONATE 1 APPLICATION: 500 CLOTH TOPICAL at 04:26

## 2023-01-01 RX ADMIN — LINEZOLID 600 MG: 600 INJECTION, SOLUTION INTRAVENOUS at 22:50

## 2023-01-01 RX ADMIN — BUDESONIDE 0.5 MG: 0.5 INHALANT RESPIRATORY (INHALATION) at 19:41

## 2023-01-01 RX ADMIN — HEPARIN SODIUM 5000 UNITS: 5000 INJECTION, SOLUTION INTRAVENOUS; SUBCUTANEOUS at 20:01

## 2023-01-01 RX ADMIN — LEVETIRACETAM 500 MG: 500 TABLET, FILM COATED ORAL at 08:05

## 2023-01-01 RX ADMIN — DEXTROSE MONOHYDRATE 25 G: 25 INJECTION, SOLUTION INTRAVENOUS at 17:44

## 2023-01-01 RX ADMIN — PANTOPRAZOLE SODIUM 40 MG: 40 INJECTION, POWDER, FOR SOLUTION INTRAVENOUS at 05:37

## 2023-01-01 RX ADMIN — LEVETIRACETAM 500 MG: 500 TABLET, FILM COATED ORAL at 08:12

## 2023-01-01 RX ADMIN — LORAZEPAM 1 MG: 2 INJECTION INTRAMUSCULAR; INTRAVENOUS at 06:08

## 2023-01-01 RX ADMIN — OXYCODONE AND ACETAMINOPHEN 1 TABLET: 325; 10 TABLET ORAL at 12:45

## 2023-01-01 RX ADMIN — GABAPENTIN 300 MG: 300 CAPSULE ORAL at 15:06

## 2023-01-01 RX ADMIN — SODIUM CHLORIDE, POTASSIUM CHLORIDE, SODIUM LACTATE AND CALCIUM CHLORIDE 1000 ML: 600; 310; 30; 20 INJECTION, SOLUTION INTRAVENOUS at 15:42

## 2023-01-01 RX ADMIN — FUROSEMIDE 40 MG: 10 INJECTION, SOLUTION INTRAVENOUS at 21:53

## 2023-01-01 RX ADMIN — INSULIN LISPRO 5 UNITS: 100 INJECTION, SOLUTION INTRAVENOUS; SUBCUTANEOUS at 17:13

## 2023-01-01 RX ADMIN — DEXMEDETOMIDINE HYDROCHLORIDE 1 MCG/KG/HR: 4 INJECTION, SOLUTION INTRAVENOUS at 20:21

## 2023-01-01 RX ADMIN — INSULIN DETEMIR 10 UNITS: 100 INJECTION, SOLUTION SUBCUTANEOUS at 08:07

## 2023-01-01 RX ADMIN — AMIODARONE HYDROCHLORIDE 0.5 MG/MIN: 1.8 INJECTION, SOLUTION INTRAVENOUS at 02:24

## 2023-01-01 RX ADMIN — DOCUSATE SODIUM 50 MG AND SENNOSIDES 8.6 MG 1 TABLET: 8.6; 5 TABLET, FILM COATED ORAL at 09:24

## 2023-01-01 RX ADMIN — LEVOFLOXACIN 500 MG: 500 TABLET, FILM COATED ORAL at 08:43

## 2023-01-01 RX ADMIN — Medication 10 ML: at 09:04

## 2023-01-01 RX ADMIN — ENOXAPARIN SODIUM 120 MG: 150 INJECTION SUBCUTANEOUS at 20:21

## 2023-01-01 RX ADMIN — BUDESONIDE 0.5 MG: 0.5 INHALANT RESPIRATORY (INHALATION) at 19:21

## 2023-01-01 RX ADMIN — DILTIAZEM HYDROCHLORIDE 300 MG: 300 CAPSULE, COATED, EXTENDED RELEASE ORAL at 10:06

## 2023-01-01 RX ADMIN — BUDESONIDE AND FORMOTEROL FUMARATE DIHYDRATE 2 PUFF: 160; 4.5 AEROSOL RESPIRATORY (INHALATION) at 06:58

## 2023-01-01 RX ADMIN — GABAPENTIN 300 MG: 250 SOLUTION ORAL at 14:35

## 2023-01-01 RX ADMIN — Medication 10 ML: at 20:51

## 2023-01-01 RX ADMIN — BUDESONIDE AND FORMOTEROL FUMARATE DIHYDRATE 2 PUFF: 160; 4.5 AEROSOL RESPIRATORY (INHALATION) at 18:54

## 2023-01-01 RX ADMIN — IPRATROPIUM BROMIDE AND ALBUTEROL SULFATE 3 ML: 2.5; .5 SOLUTION RESPIRATORY (INHALATION) at 15:09

## 2023-01-01 RX ADMIN — CHLORHEXIDINE GLUCONATE 1 APPLICATION: 500 CLOTH TOPICAL at 03:55

## 2023-01-01 RX ADMIN — LEVETIRACETAM 500 MG: 500 TABLET, FILM COATED ORAL at 20:59

## 2023-01-01 RX ADMIN — PANTOPRAZOLE SODIUM 40 MG: 40 TABLET, DELAYED RELEASE ORAL at 06:08

## 2023-01-01 RX ADMIN — POLYETHYLENE GLYCOL 3350 17 G: 17 POWDER, FOR SOLUTION ORAL at 09:02

## 2023-01-01 RX ADMIN — DEXMEDETOMIDINE HYDROCHLORIDE 0.8 MCG/KG/HR: 4 INJECTION, SOLUTION INTRAVENOUS at 17:22

## 2023-01-01 RX ADMIN — BUDESONIDE 0.5 MG: 0.5 INHALANT RESPIRATORY (INHALATION) at 06:34

## 2023-01-01 RX ADMIN — METOPROLOL TARTRATE 50 MG: 50 TABLET, FILM COATED ORAL at 21:29

## 2023-01-01 RX ADMIN — LEVETIRACETAM 500 MG: 500 TABLET, FILM COATED ORAL at 08:25

## 2023-01-01 RX ADMIN — IPRATROPIUM BROMIDE 0.5 MG: 0.5 SOLUTION RESPIRATORY (INHALATION) at 10:23

## 2023-01-01 RX ADMIN — HEPARIN SODIUM 5000 UNITS: 5000 INJECTION, SOLUTION INTRAVENOUS; SUBCUTANEOUS at 21:04

## 2023-01-01 RX ADMIN — INSULIN LISPRO 3 UNITS: 100 INJECTION, SOLUTION INTRAVENOUS; SUBCUTANEOUS at 17:32

## 2023-01-01 RX ADMIN — Medication 10 ML: at 21:25

## 2023-01-01 RX ADMIN — SUCRALFATE 1 G: 1 TABLET ORAL at 18:27

## 2023-01-01 RX ADMIN — METOPROLOL TARTRATE 25 MG: 25 TABLET, FILM COATED ORAL at 20:34

## 2023-01-01 RX ADMIN — IPRATROPIUM BROMIDE 0.5 MG: 0.5 SOLUTION RESPIRATORY (INHALATION) at 19:21

## 2023-01-01 RX ADMIN — MORPHINE SULFATE 5 MG: 4 INJECTION, SOLUTION INTRAMUSCULAR; INTRAVENOUS at 11:02

## 2023-01-01 RX ADMIN — ENOXAPARIN SODIUM 120 MG: 150 INJECTION SUBCUTANEOUS at 20:01

## 2023-01-01 RX ADMIN — ATORVASTATIN CALCIUM 20 MG: 10 TABLET, FILM COATED ORAL at 20:44

## 2023-01-01 RX ADMIN — DOCUSATE SODIUM 50 MG AND SENNOSIDES 8.6 MG 1 TABLET: 8.6; 5 TABLET, FILM COATED ORAL at 08:48

## 2023-01-01 RX ADMIN — ASPIRIN 81 MG: 81 TABLET, CHEWABLE ORAL at 09:24

## 2023-01-01 RX ADMIN — MEROPENEM 1 G: 1 INJECTION, POWDER, FOR SOLUTION INTRAVENOUS at 21:18

## 2023-01-01 RX ADMIN — INSULIN LISPRO 3 UNITS: 100 INJECTION, SOLUTION INTRAVENOUS; SUBCUTANEOUS at 00:20

## 2023-01-01 RX ADMIN — IPRATROPIUM BROMIDE 0.5 MG: 0.5 SOLUTION RESPIRATORY (INHALATION) at 07:06

## 2023-01-01 RX ADMIN — INSULIN DETEMIR 25 UNITS: 100 INJECTION, SOLUTION SUBCUTANEOUS at 20:04

## 2023-01-01 RX ADMIN — POLYETHYLENE GLYCOL 3350 17 G: 17 POWDER, FOR SOLUTION ORAL at 08:43

## 2023-01-01 RX ADMIN — LEVETIRACETAM 500 MG: 500 TABLET, FILM COATED ORAL at 08:48

## 2023-01-01 RX ADMIN — DOCUSATE SODIUM 50 MG AND SENNOSIDES 8.6 MG 1 TABLET: 8.6; 5 TABLET, FILM COATED ORAL at 09:18

## 2023-01-01 RX ADMIN — SUCRALFATE 1 G: 1 TABLET ORAL at 17:32

## 2023-01-01 RX ADMIN — Medication 10 ML: at 09:42

## 2023-01-01 RX ADMIN — IPRATROPIUM BROMIDE 0.5 MG: 0.5 SOLUTION RESPIRATORY (INHALATION) at 14:26

## 2023-01-01 RX ADMIN — FUROSEMIDE 40 MG: 10 INJECTION, SOLUTION INTRAMUSCULAR; INTRAVENOUS at 17:57

## 2023-01-01 RX ADMIN — INSULIN LISPRO 8 UNITS: 100 INJECTION, SOLUTION INTRAVENOUS; SUBCUTANEOUS at 23:50

## 2023-01-01 RX ADMIN — LEVETIRACETAM 500 MG: 500 TABLET, FILM COATED ORAL at 20:20

## 2023-01-01 RX ADMIN — DILTIAZEM HYDROCHLORIDE 60 MG: 60 TABLET, FILM COATED ORAL at 15:03

## 2023-01-01 RX ADMIN — DEXMEDETOMIDINE HYDROCHLORIDE 0.5 MCG/KG/HR: 4 INJECTION, SOLUTION INTRAVENOUS at 07:52

## 2023-01-01 RX ADMIN — OXYCODONE AND ACETAMINOPHEN 1 TABLET: 325; 10 TABLET ORAL at 14:57

## 2023-01-01 RX ADMIN — VANCOMYCIN HYDROCHLORIDE 1000 MG: 1 INJECTION, POWDER, LYOPHILIZED, FOR SOLUTION INTRAVENOUS at 17:52

## 2023-01-01 RX ADMIN — MORPHINE SULFATE 5 MG: 4 INJECTION, SOLUTION INTRAMUSCULAR; INTRAVENOUS at 04:15

## 2023-01-01 RX ADMIN — HYDROMORPHONE HYDROCHLORIDE 0.5 MG: 1 INJECTION, SOLUTION INTRAMUSCULAR; INTRAVENOUS; SUBCUTANEOUS at 12:58

## 2023-01-01 RX ADMIN — EPINEPHRINE 1 MG: 0.1 INJECTION INTRACARDIAC; INTRAVENOUS at 18:19

## 2023-01-01 RX ADMIN — PANTOPRAZOLE SODIUM 40 MG: 40 INJECTION, POWDER, FOR SOLUTION INTRAVENOUS at 06:12

## 2023-01-01 RX ADMIN — DILTIAZEM HYDROCHLORIDE 90 MG: 90 TABLET, FILM COATED ORAL at 21:04

## 2023-01-01 RX ADMIN — DILTIAZEM HYDROCHLORIDE 90 MG: 90 TABLET, FILM COATED ORAL at 05:05

## 2023-01-01 RX ADMIN — BUTALBITAL, ACETAMINOPHEN, AND CAFFEINE 1 TABLET: 50; 325; 40 TABLET ORAL at 06:11

## 2023-01-01 RX ADMIN — PROPOFOL 20 MCG/KG/MIN: 10 INJECTION, EMULSION INTRAVENOUS at 07:13

## 2023-01-01 RX ADMIN — IPRATROPIUM BROMIDE AND ALBUTEROL SULFATE 3 ML: 2.5; .5 SOLUTION RESPIRATORY (INHALATION) at 02:15

## 2023-01-01 RX ADMIN — METOPROLOL TARTRATE 50 MG: 50 TABLET, FILM COATED ORAL at 21:03

## 2023-01-01 RX ADMIN — SUCRALFATE 1 G: 1 TABLET ORAL at 07:55

## 2023-01-01 RX ADMIN — TAZOBACTAM SODIUM AND PIPERACILLIN SODIUM 4.5 G: 500; 4 INJECTION, SOLUTION INTRAVENOUS at 05:20

## 2023-01-01 RX ADMIN — GABAPENTIN 300 MG: 250 SOLUTION ORAL at 21:11

## 2023-01-01 RX ADMIN — BUTALBITAL, ACETAMINOPHEN, AND CAFFEINE 1 TABLET: 50; 325; 40 TABLET ORAL at 12:11

## 2023-01-01 RX ADMIN — DIGOXIN 125 MCG: 0.12 TABLET ORAL at 11:47

## 2023-01-01 RX ADMIN — LEVETIRACETAM 500 MG: 500 TABLET, FILM COATED ORAL at 20:44

## 2023-01-01 RX ADMIN — METHYLPREDNISOLONE SODIUM SUCCINATE 20 MG: 40 INJECTION, POWDER, FOR SOLUTION INTRAMUSCULAR; INTRAVENOUS at 05:51

## 2023-01-01 RX ADMIN — INSULIN DETEMIR 25 UNITS: 100 INJECTION, SOLUTION SUBCUTANEOUS at 08:29

## 2023-01-01 RX ADMIN — INSULIN LISPRO 3 UNITS: 100 INJECTION, SOLUTION INTRAVENOUS; SUBCUTANEOUS at 23:42

## 2023-01-01 RX ADMIN — IPRATROPIUM BROMIDE 0.5 MG: 0.5 SOLUTION RESPIRATORY (INHALATION) at 13:58

## 2023-01-01 RX ADMIN — POLYETHYLENE GLYCOL 3350 17 G: 17 POWDER, FOR SOLUTION ORAL at 08:23

## 2023-01-01 RX ADMIN — DILTIAZEM HYDROCHLORIDE 90 MG: 90 TABLET, FILM COATED ORAL at 14:47

## 2023-01-01 RX ADMIN — METOPROLOL TARTRATE 50 MG: 50 TABLET, FILM COATED ORAL at 20:21

## 2023-01-01 RX ADMIN — LEVETIRACETAM 500 MG: 500 TABLET, FILM COATED ORAL at 20:25

## 2023-01-01 RX ADMIN — IPRATROPIUM BROMIDE 0.5 MG: 0.5 SOLUTION RESPIRATORY (INHALATION) at 14:34

## 2023-01-01 RX ADMIN — SODIUM CHLORIDE 11.1 UNITS/HR: 9 INJECTION, SOLUTION INTRAVENOUS at 22:36

## 2023-01-01 RX ADMIN — INSULIN HUMAN 9 UNITS: 100 INJECTION, SOLUTION PARENTERAL at 09:31

## 2023-01-01 RX ADMIN — ENOXAPARIN SODIUM 120 MG: 150 INJECTION SUBCUTANEOUS at 11:53

## 2023-01-01 RX ADMIN — Medication 10 ML: at 20:19

## 2023-01-01 RX ADMIN — LINEZOLID 600 MG: 600 INJECTION, SOLUTION INTRAVENOUS at 10:22

## 2023-01-01 RX ADMIN — DEXMEDETOMIDINE HYDROCHLORIDE 0.8 MCG/KG/HR: 4 INJECTION, SOLUTION INTRAVENOUS at 15:48

## 2023-01-01 RX ADMIN — IPRATROPIUM BROMIDE 0.5 MG: 0.5 SOLUTION RESPIRATORY (INHALATION) at 14:11

## 2023-01-01 RX ADMIN — DIGOXIN 250 MCG: 250 TABLET ORAL at 12:51

## 2023-01-01 RX ADMIN — Medication 1 TABLET: at 08:10

## 2023-01-01 RX ADMIN — METOCLOPRAMIDE 10 MG: 10 TABLET ORAL at 21:11

## 2023-01-01 RX ADMIN — DILTIAZEM HYDROCHLORIDE 300 MG: 300 CAPSULE, COATED, EXTENDED RELEASE ORAL at 08:11

## 2023-01-01 RX ADMIN — OXYCODONE AND ACETAMINOPHEN 1 TABLET: 325; 10 TABLET ORAL at 20:32

## 2023-01-01 RX ADMIN — Medication 10 ML: at 21:24

## 2023-01-01 RX ADMIN — INSULIN LISPRO 3 UNITS: 100 INJECTION, SOLUTION INTRAVENOUS; SUBCUTANEOUS at 05:41

## 2023-01-01 RX ADMIN — IPRATROPIUM BROMIDE 0.5 MG: 0.5 SOLUTION RESPIRATORY (INHALATION) at 19:19

## 2023-01-01 RX ADMIN — GABAPENTIN 300 MG: 250 SOLUTION ORAL at 07:01

## 2023-01-01 RX ADMIN — IPRATROPIUM BROMIDE 0.5 MG: 0.5 SOLUTION RESPIRATORY (INHALATION) at 10:28

## 2023-01-01 RX ADMIN — ATORVASTATIN CALCIUM 20 MG: 10 TABLET, FILM COATED ORAL at 20:28

## 2023-01-01 RX ADMIN — METOCLOPRAMIDE 10 MG: 10 TABLET ORAL at 07:16

## 2023-01-01 RX ADMIN — DIGOXIN 250 MCG: 250 TABLET ORAL at 12:08

## 2023-01-01 RX ADMIN — DEXMEDETOMIDINE HYDROCHLORIDE 0.2 MCG/KG/HR: 4 INJECTION, SOLUTION INTRAVENOUS at 21:07

## 2023-01-01 RX ADMIN — PROPOFOL 50 MCG/KG/MIN: 10 INJECTION, EMULSION INTRAVENOUS at 09:39

## 2023-01-01 RX ADMIN — METOCLOPRAMIDE 10 MG: 10 TABLET ORAL at 17:51

## 2023-01-01 RX ADMIN — IPRATROPIUM BROMIDE 0.5 MG: 0.5 SOLUTION RESPIRATORY (INHALATION) at 06:41

## 2023-01-01 RX ADMIN — IPRATROPIUM BROMIDE 0.5 MG: 0.5 SOLUTION RESPIRATORY (INHALATION) at 10:29

## 2023-01-01 RX ADMIN — ASPIRIN 81 MG: 81 TABLET, CHEWABLE ORAL at 08:53

## 2023-01-01 RX ADMIN — ATORVASTATIN CALCIUM 20 MG: 10 TABLET, FILM COATED ORAL at 21:21

## 2023-01-01 RX ADMIN — DILTIAZEM HYDROCHLORIDE 90 MG: 90 TABLET, FILM COATED ORAL at 21:16

## 2023-01-01 RX ADMIN — INSULIN LISPRO 3 UNITS: 100 INJECTION, SOLUTION INTRAVENOUS; SUBCUTANEOUS at 00:12

## 2023-01-01 RX ADMIN — METOCLOPRAMIDE 10 MG: 10 TABLET ORAL at 18:17

## 2023-01-01 RX ADMIN — POTASSIUM CHLORIDE 20 MEQ: 10 CAPSULE, COATED, EXTENDED RELEASE ORAL at 12:11

## 2023-01-01 RX ADMIN — INSULIN LISPRO 3 UNITS: 100 INJECTION, SOLUTION INTRAVENOUS; SUBCUTANEOUS at 16:59

## 2023-01-01 RX ADMIN — GABAPENTIN 300 MG: 250 SOLUTION ORAL at 23:04

## 2023-01-01 RX ADMIN — GABAPENTIN 300 MG: 300 CAPSULE ORAL at 09:04

## 2023-01-01 RX ADMIN — LIDOCAINE HYDROCHLORIDE ANHYDROUS 1 ML: 10 INJECTION, SOLUTION INFILTRATION at 10:16

## 2023-01-01 RX ADMIN — Medication 10 ML: at 10:22

## 2023-01-01 RX ADMIN — INSULIN LISPRO 3 UNITS: 100 INJECTION, SOLUTION INTRAVENOUS; SUBCUTANEOUS at 11:13

## 2023-01-01 RX ADMIN — DOCUSATE SODIUM 50 MG AND SENNOSIDES 8.6 MG 1 TABLET: 8.6; 5 TABLET, FILM COATED ORAL at 09:02

## 2023-01-01 RX ADMIN — METOPROLOL TARTRATE 50 MG: 50 TABLET, FILM COATED ORAL at 21:06

## 2023-01-01 RX ADMIN — MEROPENEM 1 G: 1 INJECTION, POWDER, FOR SOLUTION INTRAVENOUS at 05:15

## 2023-01-01 RX ADMIN — LEVETIRACETAM 500 MG: 500 TABLET, FILM COATED ORAL at 20:50

## 2023-01-01 RX ADMIN — LEVETIRACETAM 500 MG: 500 TABLET, FILM COATED ORAL at 08:53

## 2023-01-01 RX ADMIN — LEVOFLOXACIN 500 MG: 500 TABLET, FILM COATED ORAL at 08:26

## 2023-01-01 RX ADMIN — ASPIRIN 81 MG: 81 TABLET, CHEWABLE ORAL at 09:18

## 2023-01-01 RX ADMIN — MORPHINE SULFATE 5 MG: 4 INJECTION, SOLUTION INTRAMUSCULAR; INTRAVENOUS at 05:28

## 2023-01-01 RX ADMIN — DIGOXIN 125 MCG: 0.12 TABLET ORAL at 11:46

## 2023-01-01 RX ADMIN — METOCLOPRAMIDE 10 MG: 10 TABLET ORAL at 16:36

## 2023-01-01 RX ADMIN — DEXMEDETOMIDINE HYDROCHLORIDE 0.8 MCG/KG/HR: 4 INJECTION, SOLUTION INTRAVENOUS at 00:59

## 2023-01-01 RX ADMIN — CHLORHEXIDINE GLUCONATE 1 APPLICATION: 500 CLOTH TOPICAL at 03:58

## 2023-01-01 RX ADMIN — MEROPENEM 1 G: 1 INJECTION, POWDER, FOR SOLUTION INTRAVENOUS at 05:58

## 2023-01-01 RX ADMIN — Medication 10 ML: at 20:53

## 2023-01-01 RX ADMIN — SODIUM CHLORIDE, POTASSIUM CHLORIDE, SODIUM LACTATE AND CALCIUM CHLORIDE 1000 ML: 600; 310; 30; 20 INJECTION, SOLUTION INTRAVENOUS at 09:24

## 2023-01-01 RX ADMIN — Medication 10 ML: at 21:09

## 2023-01-01 RX ADMIN — PROPOFOL 40 MCG/KG/MIN: 10 INJECTION, EMULSION INTRAVENOUS at 01:27

## 2023-01-01 RX ADMIN — LEVETIRACETAM 500 MG: 500 TABLET, FILM COATED ORAL at 21:16

## 2023-01-01 RX ADMIN — IPRATROPIUM BROMIDE 0.5 MG: 0.5 SOLUTION RESPIRATORY (INHALATION) at 14:22

## 2023-01-01 RX ADMIN — INSULIN LISPRO 16 UNITS: 100 INJECTION, SOLUTION INTRAVENOUS; SUBCUTANEOUS at 08:30

## 2023-01-01 RX ADMIN — INSULIN DETEMIR 25 UNITS: 100 INJECTION, SOLUTION SUBCUTANEOUS at 08:01

## 2023-01-01 RX ADMIN — LEVETIRACETAM 500 MG: 500 TABLET, FILM COATED ORAL at 20:36

## 2023-01-01 RX ADMIN — TAZOBACTAM SODIUM AND PIPERACILLIN SODIUM 3.38 G: 375; 3 INJECTION, SOLUTION INTRAVENOUS at 23:39

## 2023-01-01 RX ADMIN — PANTOPRAZOLE SODIUM 40 MG: 40 TABLET, DELAYED RELEASE ORAL at 04:49

## 2023-01-01 RX ADMIN — PANTOPRAZOLE SODIUM 40 MG: 40 INJECTION, POWDER, FOR SOLUTION INTRAVENOUS at 06:11

## 2023-01-01 RX ADMIN — INSULIN LISPRO 5 UNITS: 100 INJECTION, SOLUTION INTRAVENOUS; SUBCUTANEOUS at 05:39

## 2023-01-01 RX ADMIN — INSULIN LISPRO 8 UNITS: 100 INJECTION, SOLUTION INTRAVENOUS; SUBCUTANEOUS at 06:18

## 2023-01-01 RX ADMIN — LEVETIRACETAM 500 MG: 500 TABLET, FILM COATED ORAL at 09:03

## 2023-01-01 RX ADMIN — FUROSEMIDE 40 MG: 10 INJECTION, SOLUTION INTRAVENOUS at 09:42

## 2023-01-01 RX ADMIN — Medication 10 ML: at 20:27

## 2023-01-01 RX ADMIN — GABAPENTIN 300 MG: 300 CAPSULE ORAL at 08:05

## 2023-01-01 RX ADMIN — INSULIN LISPRO 5 UNITS: 100 INJECTION, SOLUTION INTRAVENOUS; SUBCUTANEOUS at 12:51

## 2023-01-01 RX ADMIN — INSULIN LISPRO 3 UNITS: 100 INJECTION, SOLUTION INTRAVENOUS; SUBCUTANEOUS at 18:48

## 2023-01-01 RX ADMIN — INSULIN LISPRO 8 UNITS: 100 INJECTION, SOLUTION INTRAVENOUS; SUBCUTANEOUS at 12:11

## 2023-01-01 RX ADMIN — METOCLOPRAMIDE 10 MG: 10 TABLET ORAL at 12:12

## 2023-01-01 RX ADMIN — METOPROLOL TARTRATE 25 MG: 25 TABLET, FILM COATED ORAL at 20:40

## 2023-01-01 RX ADMIN — CEFTRIAXONE 1 G: 1 INJECTION, POWDER, FOR SOLUTION INTRAMUSCULAR; INTRAVENOUS at 08:52

## 2023-01-01 RX ADMIN — METOPROLOL TARTRATE 50 MG: 50 TABLET, FILM COATED ORAL at 08:48

## 2023-01-01 RX ADMIN — INSULIN LISPRO 5 UNITS: 100 INJECTION, SOLUTION INTRAVENOUS; SUBCUTANEOUS at 12:30

## 2023-01-01 RX ADMIN — BUDESONIDE 0.5 MG: 0.5 INHALANT RESPIRATORY (INHALATION) at 06:47

## 2023-01-01 RX ADMIN — DEXTROSE MONOHYDRATE 50 ML/HR: 50 INJECTION, SOLUTION INTRAVENOUS at 07:47

## 2023-01-01 RX ADMIN — GABAPENTIN 300 MG: 300 CAPSULE ORAL at 17:24

## 2023-01-01 RX ADMIN — FUROSEMIDE 40 MG: 10 INJECTION, SOLUTION INTRAMUSCULAR; INTRAVENOUS at 17:01

## 2023-01-01 RX ADMIN — IPRATROPIUM BROMIDE 0.5 MG: 0.5 SOLUTION RESPIRATORY (INHALATION) at 10:02

## 2023-01-01 RX ADMIN — DEXMEDETOMIDINE HYDROCHLORIDE 0.8 MCG/KG/HR: 4 INJECTION, SOLUTION INTRAVENOUS at 09:12

## 2023-01-01 RX ADMIN — MEROPENEM 1 G: 1 INJECTION, POWDER, FOR SOLUTION INTRAVENOUS at 14:11

## 2023-01-01 RX ADMIN — DILTIAZEM HYDROCHLORIDE 90 MG: 90 TABLET, FILM COATED ORAL at 10:20

## 2023-01-01 RX ADMIN — ATORVASTATIN CALCIUM 20 MG: 10 TABLET, FILM COATED ORAL at 19:51

## 2023-01-01 RX ADMIN — INSULIN LISPRO 10 UNITS: 100 INJECTION, SOLUTION INTRAVENOUS; SUBCUTANEOUS at 12:02

## 2023-01-01 RX ADMIN — IPRATROPIUM BROMIDE 0.5 MG: 0.5 SOLUTION RESPIRATORY (INHALATION) at 10:56

## 2023-01-01 RX ADMIN — SUCRALFATE 1 G: 1 TABLET ORAL at 11:30

## 2023-01-01 RX ADMIN — FUROSEMIDE 20 MG: 10 INJECTION, SOLUTION INTRAMUSCULAR; INTRAVENOUS at 20:42

## 2023-01-01 RX ADMIN — INSULIN DETEMIR 30 UNITS: 100 INJECTION, SOLUTION SUBCUTANEOUS at 08:35

## 2023-01-01 RX ADMIN — Medication 1 APPLICATION: at 20:03

## 2023-01-01 RX ADMIN — FUROSEMIDE 40 MG: 10 INJECTION, SOLUTION INTRAVENOUS at 10:18

## 2023-01-01 RX ADMIN — SUCRALFATE 1 G: 1 TABLET ORAL at 12:22

## 2023-01-01 RX ADMIN — PANTOPRAZOLE SODIUM 40 MG: 40 INJECTION, POWDER, FOR SOLUTION INTRAVENOUS at 05:45

## 2023-01-01 RX ADMIN — Medication 1 TABLET: at 09:11

## 2023-01-01 RX ADMIN — DIGOXIN 125 MCG: 0.12 TABLET ORAL at 11:41

## 2023-01-01 RX ADMIN — ASPIRIN 81 MG: 81 TABLET, CHEWABLE ORAL at 08:30

## 2023-01-01 RX ADMIN — INSULIN LISPRO 3 UNITS: 100 INJECTION, SOLUTION INTRAVENOUS; SUBCUTANEOUS at 07:31

## 2023-01-01 RX ADMIN — HYDROXYZINE HYDROCHLORIDE 25 MG: 25 TABLET ORAL at 11:39

## 2023-01-01 RX ADMIN — BUDESONIDE 0.5 MG: 0.5 INHALANT RESPIRATORY (INHALATION) at 18:41

## 2023-01-01 RX ADMIN — INSULIN LISPRO 5 UNITS: 100 INJECTION, SOLUTION INTRAVENOUS; SUBCUTANEOUS at 11:32

## 2023-01-01 RX ADMIN — FUROSEMIDE 40 MG: 40 TABLET ORAL at 09:50

## 2023-01-01 RX ADMIN — IPRATROPIUM BROMIDE 0.5 MG: 0.5 SOLUTION RESPIRATORY (INHALATION) at 18:45

## 2023-01-01 RX ADMIN — CHLORHEXIDINE GLUCONATE 1 APPLICATION: 500 CLOTH TOPICAL at 15:12

## 2023-01-01 RX ADMIN — DILTIAZEM HYDROCHLORIDE 90 MG: 90 TABLET, FILM COATED ORAL at 21:09

## 2023-01-01 RX ADMIN — ASPIRIN 81 MG: 81 TABLET, CHEWABLE ORAL at 08:13

## 2023-01-01 RX ADMIN — CHLORHEXIDINE GLUCONATE 1 APPLICATION: 500 CLOTH TOPICAL at 05:08

## 2023-01-01 RX ADMIN — GABAPENTIN 300 MG: 250 SOLUTION ORAL at 05:37

## 2023-01-01 RX ADMIN — PANTOPRAZOLE SODIUM 40 MG: 40 INJECTION, POWDER, FOR SOLUTION INTRAVENOUS at 05:05

## 2023-01-01 RX ADMIN — SUCRALFATE 1 G: 1 TABLET ORAL at 21:09

## 2023-01-01 RX ADMIN — DIGOXIN 250 MCG: 250 TABLET ORAL at 11:54

## 2023-01-01 RX ADMIN — PROPOFOL 10 MCG/KG/MIN: 10 INJECTION, EMULSION INTRAVENOUS at 03:51

## 2023-01-01 RX ADMIN — INSULIN LISPRO 5 UNITS: 100 INJECTION, SOLUTION INTRAVENOUS; SUBCUTANEOUS at 00:29

## 2023-01-01 RX ADMIN — DILTIAZEM HYDROCHLORIDE 90 MG: 90 TABLET, FILM COATED ORAL at 15:00

## 2023-01-01 RX ADMIN — ASPIRIN 81 MG: 81 TABLET, CHEWABLE ORAL at 08:03

## 2023-01-01 RX ADMIN — INSULIN DETEMIR 30 UNITS: 100 INJECTION, SOLUTION SUBCUTANEOUS at 09:45

## 2023-01-01 RX ADMIN — DILTIAZEM HYDROCHLORIDE 90 MG: 90 TABLET, FILM COATED ORAL at 14:25

## 2023-01-01 RX ADMIN — PANTOPRAZOLE SODIUM 40 MG: 40 TABLET, DELAYED RELEASE ORAL at 05:18

## 2023-01-01 RX ADMIN — ATORVASTATIN CALCIUM 20 MG: 10 TABLET, FILM COATED ORAL at 21:28

## 2023-01-01 RX ADMIN — INSULIN LISPRO 5 UNITS: 100 INJECTION, SOLUTION INTRAVENOUS; SUBCUTANEOUS at 11:28

## 2023-01-01 RX ADMIN — RACEPINEPHRINE HYDROCHLORIDE 0.5 ML: 11.25 SOLUTION RESPIRATORY (INHALATION) at 04:53

## 2023-01-01 RX ADMIN — LEVETIRACETAM 500 MG: 500 TABLET, FILM COATED ORAL at 20:39

## 2023-01-01 RX ADMIN — SUCRALFATE 1 G: 1 TABLET ORAL at 07:46

## 2023-01-01 RX ADMIN — INSULIN DETEMIR 30 UNITS: 100 INJECTION, SOLUTION SUBCUTANEOUS at 10:02

## 2023-01-01 RX ADMIN — GABAPENTIN 300 MG: 300 CAPSULE ORAL at 20:22

## 2023-01-01 RX ADMIN — DILTIAZEM HYDROCHLORIDE 90 MG: 90 TABLET, FILM COATED ORAL at 21:03

## 2023-01-01 RX ADMIN — LEVETIRACETAM 500 MG: 500 TABLET, FILM COATED ORAL at 21:10

## 2023-01-01 RX ADMIN — ATORVASTATIN CALCIUM 20 MG: 10 TABLET, FILM COATED ORAL at 21:09

## 2023-01-01 RX ADMIN — ATORVASTATIN CALCIUM 20 MG: 10 TABLET, FILM COATED ORAL at 21:56

## 2023-01-01 RX ADMIN — FUROSEMIDE 40 MG: 10 INJECTION, SOLUTION INTRAMUSCULAR; INTRAVENOUS at 17:24

## 2023-01-01 RX ADMIN — METOCLOPRAMIDE 10 MG: 10 TABLET ORAL at 12:51

## 2023-01-01 RX ADMIN — INSULIN LISPRO 12 UNITS: 100 INJECTION, SOLUTION INTRAVENOUS; SUBCUTANEOUS at 17:10

## 2023-01-01 RX ADMIN — CHLORHEXIDINE GLUCONATE 1 APPLICATION: 500 CLOTH TOPICAL at 04:06

## 2023-01-01 RX ADMIN — ATORVASTATIN CALCIUM 20 MG: 10 TABLET, FILM COATED ORAL at 20:52

## 2023-01-01 RX ADMIN — PROPOFOL 50 MCG/KG/MIN: 10 INJECTION, EMULSION INTRAVENOUS at 01:23

## 2023-01-01 RX ADMIN — METOPROLOL TARTRATE 50 MG: 50 TABLET, FILM COATED ORAL at 20:42

## 2023-01-01 RX ADMIN — ATORVASTATIN CALCIUM 20 MG: 10 TABLET, FILM COATED ORAL at 20:18

## 2023-01-01 RX ADMIN — GABAPENTIN 300 MG: 300 CAPSULE ORAL at 09:58

## 2023-01-01 RX ADMIN — METHYLNALTREXONE BROMIDE 12 MG: 12 INJECTION, SOLUTION SUBCUTANEOUS at 08:54

## 2023-01-01 RX ADMIN — MEROPENEM 1 G: 1 INJECTION, POWDER, FOR SOLUTION INTRAVENOUS at 21:53

## 2023-01-01 RX ADMIN — MEROPENEM 1 G: 1 INJECTION, POWDER, FOR SOLUTION INTRAVENOUS at 05:13

## 2023-01-01 RX ADMIN — DILTIAZEM HYDROCHLORIDE 90 MG: 90 TABLET, FILM COATED ORAL at 05:37

## 2023-01-01 RX ADMIN — IPRATROPIUM BROMIDE 0.5 MG: 0.5 SOLUTION RESPIRATORY (INHALATION) at 06:31

## 2023-01-01 RX ADMIN — INSULIN LISPRO 3 UNITS: 100 INJECTION, SOLUTION INTRAVENOUS; SUBCUTANEOUS at 06:12

## 2023-01-01 RX ADMIN — DILTIAZEM HYDROCHLORIDE 90 MG: 90 TABLET, FILM COATED ORAL at 22:27

## 2023-01-01 RX ADMIN — INSULIN LISPRO 3 UNITS: 100 INJECTION, SOLUTION INTRAVENOUS; SUBCUTANEOUS at 23:46

## 2023-01-01 RX ADMIN — MEROPENEM 1 G: 1 INJECTION, POWDER, FOR SOLUTION INTRAVENOUS at 22:55

## 2023-01-01 RX ADMIN — IPRATROPIUM BROMIDE 0.5 MG: 0.5 SOLUTION RESPIRATORY (INHALATION) at 15:19

## 2023-01-01 RX ADMIN — FUROSEMIDE 20 MG: 20 TABLET ORAL at 08:30

## 2023-01-01 RX ADMIN — INSULIN LISPRO 5 UNITS: 100 INJECTION, SOLUTION INTRAVENOUS; SUBCUTANEOUS at 05:54

## 2023-01-01 RX ADMIN — CHLORHEXIDINE GLUCONATE 1 APPLICATION: 500 CLOTH TOPICAL at 04:31

## 2023-01-01 RX ADMIN — INSULIN LISPRO 8 UNITS: 100 INJECTION, SOLUTION INTRAVENOUS; SUBCUTANEOUS at 00:11

## 2023-01-01 RX ADMIN — CHLORHEXIDINE GLUCONATE 1 APPLICATION: 500 CLOTH TOPICAL at 04:55

## 2023-01-01 RX ADMIN — IPRATROPIUM BROMIDE 0.5 MG: 0.5 SOLUTION RESPIRATORY (INHALATION) at 09:52

## 2023-01-01 RX ADMIN — GABAPENTIN 300 MG: 300 CAPSULE ORAL at 20:53

## 2023-01-01 RX ADMIN — LEVETIRACETAM 500 MG: 500 TABLET, FILM COATED ORAL at 09:49

## 2023-01-01 RX ADMIN — METOPROLOL TARTRATE 50 MG: 50 TABLET, FILM COATED ORAL at 09:24

## 2023-01-01 RX ADMIN — DEXMEDETOMIDINE HYDROCHLORIDE 0.6 MCG/KG/HR: 4 INJECTION, SOLUTION INTRAVENOUS at 08:06

## 2023-01-01 RX ADMIN — IPRATROPIUM BROMIDE 0.5 MG: 0.5 SOLUTION RESPIRATORY (INHALATION) at 06:47

## 2023-01-01 RX ADMIN — METOCLOPRAMIDE 10 MG: 10 TABLET ORAL at 20:32

## 2023-01-01 RX ADMIN — DILTIAZEM HYDROCHLORIDE 60 MG: 60 TABLET, FILM COATED ORAL at 06:11

## 2023-01-01 RX ADMIN — HEPARIN SODIUM 5000 UNITS: 5000 INJECTION, SOLUTION INTRAVENOUS; SUBCUTANEOUS at 09:46

## 2023-01-01 RX ADMIN — DIGOXIN 125 MCG: 0.12 TABLET ORAL at 12:01

## 2023-01-01 RX ADMIN — BUDESONIDE 0.5 MG: 0.5 INHALANT RESPIRATORY (INHALATION) at 20:30

## 2023-01-01 RX ADMIN — BUDESONIDE 0.5 MG: 0.5 INHALANT RESPIRATORY (INHALATION) at 06:04

## 2023-01-01 RX ADMIN — VANCOMYCIN HYDROCHLORIDE 1000 MG: 1 INJECTION, POWDER, LYOPHILIZED, FOR SOLUTION INTRAVENOUS at 04:58

## 2023-01-01 RX ADMIN — INSULIN DETEMIR 10 UNITS: 100 INJECTION, SOLUTION SUBCUTANEOUS at 10:21

## 2023-01-01 RX ADMIN — LINEZOLID 600 MG: 600 INJECTION, SOLUTION INTRAVENOUS at 10:01

## 2023-01-01 RX ADMIN — INSULIN LISPRO 8 UNITS: 100 INJECTION, SOLUTION INTRAVENOUS; SUBCUTANEOUS at 17:00

## 2023-01-01 RX ADMIN — METOPROLOL TARTRATE 50 MG: 50 TABLET, FILM COATED ORAL at 20:45

## 2023-01-01 RX ADMIN — ACETAMINOPHEN 650 MG: 650 SOLUTION ORAL at 11:20

## 2023-01-01 RX ADMIN — IPRATROPIUM BROMIDE 0.5 MG: 0.5 SOLUTION RESPIRATORY (INHALATION) at 18:50

## 2023-01-01 RX ADMIN — HYDROMORPHONE HYDROCHLORIDE 0.5 MG: 1 INJECTION, SOLUTION INTRAMUSCULAR; INTRAVENOUS; SUBCUTANEOUS at 03:13

## 2023-01-01 RX ADMIN — SUCRALFATE 1 G: 1 TABLET ORAL at 20:28

## 2023-01-01 RX ADMIN — DILTIAZEM HYDROCHLORIDE 240 MG: 240 CAPSULE, EXTENDED RELEASE ORAL at 08:04

## 2023-01-01 RX ADMIN — INSULIN LISPRO 3 UNITS: 100 INJECTION, SOLUTION INTRAVENOUS; SUBCUTANEOUS at 05:02

## 2023-01-01 RX ADMIN — INSULIN LISPRO 5 UNITS: 100 INJECTION, SOLUTION INTRAVENOUS; SUBCUTANEOUS at 17:12

## 2023-01-01 RX ADMIN — CHLORHEXIDINE GLUCONATE 1 APPLICATION: 500 CLOTH TOPICAL at 04:19

## 2023-01-01 RX ADMIN — METOCLOPRAMIDE 10 MG: 10 TABLET ORAL at 12:09

## 2023-01-01 RX ADMIN — METOPROLOL TARTRATE 50 MG: 50 TABLET, FILM COATED ORAL at 21:56

## 2023-01-01 RX ADMIN — BUTALBITAL, ACETAMINOPHEN, AND CAFFEINE 1 TABLET: 50; 325; 40 TABLET ORAL at 19:38

## 2023-01-01 RX ADMIN — BUDESONIDE AND FORMOTEROL FUMARATE DIHYDRATE 2 PUFF: 160; 4.5 AEROSOL RESPIRATORY (INHALATION) at 18:34

## 2023-01-01 RX ADMIN — ASPIRIN 81 MG: 81 TABLET, CHEWABLE ORAL at 08:23

## 2023-01-01 RX ADMIN — INSULIN DETEMIR 30 UNITS: 100 INJECTION, SOLUTION SUBCUTANEOUS at 09:22

## 2023-01-01 RX ADMIN — DEXMEDETOMIDINE HYDROCHLORIDE 0.2 MCG/KG/HR: 4 INJECTION, SOLUTION INTRAVENOUS at 20:16

## 2023-01-01 RX ADMIN — METHYLPREDNISOLONE SODIUM SUCCINATE 60 MG: 125 INJECTION, POWDER, FOR SOLUTION INTRAMUSCULAR; INTRAVENOUS at 18:06

## 2023-01-01 RX ADMIN — SUCRALFATE 1 G: 1 TABLET ORAL at 11:55

## 2023-01-01 RX ADMIN — OXYCODONE HYDROCHLORIDE AND ACETAMINOPHEN 1 TABLET: 10; 325 TABLET ORAL at 07:14

## 2023-01-01 RX ADMIN — INSULIN DETEMIR 30 UNITS: 100 INJECTION, SOLUTION SUBCUTANEOUS at 20:20

## 2023-01-01 RX ADMIN — LEVETIRACETAM 500 MG: 500 TABLET, FILM COATED ORAL at 09:24

## 2023-01-01 RX ADMIN — VANCOMYCIN HYDROCHLORIDE 750 MG: 1 INJECTION, POWDER, LYOPHILIZED, FOR SOLUTION INTRAVENOUS at 11:55

## 2023-01-01 RX ADMIN — METOLAZONE 5 MG: 5 TABLET ORAL at 14:00

## 2023-01-01 RX ADMIN — Medication 10 ML: at 08:26

## 2023-01-01 RX ADMIN — INSULIN HUMAN 10 UNITS: 100 INJECTION, SOLUTION PARENTERAL at 13:00

## 2023-01-01 RX ADMIN — DEXMEDETOMIDINE HYDROCHLORIDE 0.5 MCG/KG/HR: 4 INJECTION, SOLUTION INTRAVENOUS at 14:06

## 2023-01-01 RX ADMIN — GABAPENTIN 300 MG: 250 SOLUTION ORAL at 13:56

## 2023-01-01 RX ADMIN — LEVOFLOXACIN 500 MG: 500 TABLET, FILM COATED ORAL at 09:18

## 2023-01-01 RX ADMIN — INSULIN LISPRO 8 UNITS: 100 INJECTION, SOLUTION INTRAVENOUS; SUBCUTANEOUS at 07:55

## 2023-01-01 RX ADMIN — PANTOPRAZOLE SODIUM 40 MG: 40 TABLET, DELAYED RELEASE ORAL at 05:39

## 2023-01-01 RX ADMIN — BUDESONIDE 0.5 MG: 0.5 INHALANT RESPIRATORY (INHALATION) at 07:04

## 2023-01-23 ENCOUNTER — HOSPITAL ENCOUNTER (EMERGENCY)
Facility: HOSPITAL | Age: 68
Discharge: HOME | End: 2023-01-23
Attending: EMERGENCY MEDICINE | Admitting: EMERGENCY MEDICINE
Payer: MEDICARE

## 2023-01-23 ENCOUNTER — APPOINTMENT (OUTPATIENT)
Dept: CT IMAGING | Facility: HOSPITAL | Age: 68
End: 2023-01-23
Payer: MEDICARE

## 2023-01-23 VITALS
DIASTOLIC BLOOD PRESSURE: 88 MMHG | SYSTOLIC BLOOD PRESSURE: 171 MMHG | RESPIRATION RATE: 20 BRPM | BODY MASS INDEX: 36.94 KG/M2 | HEIGHT: 70 IN | WEIGHT: 258 LBS | HEART RATE: 69 BPM | OXYGEN SATURATION: 97 % | TEMPERATURE: 99.7 F

## 2023-01-23 DIAGNOSIS — R51.9 SCALP PAIN: Primary | ICD-10-CM

## 2023-01-23 DIAGNOSIS — R73.9 HYPERGLYCEMIA: ICD-10-CM

## 2023-01-23 LAB
ANION GAP SERPL CALCULATED.3IONS-SCNC: 12 MMOL/L (ref 5–15)
BUN SERPL-MCNC: 16 MG/DL (ref 8–23)
BUN/CREAT SERPL: 19.8 (ref 7–25)
BURR CELLS BLD QL SMEAR: ABNORMAL
CALCIUM SPEC-SCNC: 9.2 MG/DL (ref 8.6–10.5)
CHLORIDE SERPL-SCNC: 101 MMOL/L (ref 98–107)
CLUMPED PLATELETS: PRESENT
CO2 SERPL-SCNC: 23 MMOL/L (ref 22–29)
CREAT SERPL-MCNC: 0.81 MG/DL (ref 0.76–1.27)
DEPRECATED RDW RBC AUTO: 44.1 FL (ref 37–54)
EGFRCR SERPLBLD CKD-EPI 2021: 96.6 ML/MIN/1.73
ERYTHROCYTE [DISTWIDTH] IN BLOOD BY AUTOMATED COUNT: 12.6 % (ref 12.3–15.4)
GLUCOSE SERPL-MCNC: 245 MG/DL (ref 65–99)
HCT VFR BLD AUTO: 44.5 % (ref 37.5–51)
HGB BLD-MCNC: 14.4 G/DL (ref 13–17.7)
LYMPHOCYTES # BLD MANUAL: 3.09 10*3/MM3 (ref 0.7–3.1)
LYMPHOCYTES NFR BLD MANUAL: 3 % (ref 5–12)
MCH RBC QN AUTO: 30.8 PG (ref 26.6–33)
MCHC RBC AUTO-ENTMCNC: 32.4 G/DL (ref 31.5–35.7)
MCV RBC AUTO: 95.1 FL (ref 79–97)
MONOCYTES # BLD: 0.4 10*3/MM3 (ref 0.1–0.9)
NEUTROPHILS # BLD AUTO: 9.93 10*3/MM3 (ref 1.7–7)
NEUTROPHILS NFR BLD MANUAL: 74 % (ref 42.7–76)
PLATELET # BLD AUTO: 162 10*3/MM3 (ref 140–450)
PMV BLD AUTO: 11.4 FL (ref 6–12)
POIKILOCYTOSIS BLD QL SMEAR: ABNORMAL
POLYCHROMASIA BLD QL SMEAR: ABNORMAL
POTASSIUM SERPL-SCNC: 4.2 MMOL/L (ref 3.5–5.2)
RBC # BLD AUTO: 4.68 10*6/MM3 (ref 4.14–5.8)
SODIUM SERPL-SCNC: 136 MMOL/L (ref 136–145)
VARIANT LYMPHS NFR BLD MANUAL: 20 % (ref 19.6–45.3)
VARIANT LYMPHS NFR BLD MANUAL: 3 % (ref 0–5)
WBC MORPH BLD: NORMAL
WBC NRBC COR # BLD: 13.42 10*3/MM3 (ref 3.4–10.8)

## 2023-01-23 PROCEDURE — 80048 BASIC METABOLIC PNL TOTAL CA: CPT | Performed by: EMERGENCY MEDICINE

## 2023-01-23 PROCEDURE — 99283 EMERGENCY DEPT VISIT LOW MDM: CPT

## 2023-01-23 PROCEDURE — 85025 COMPLETE CBC W/AUTO DIFF WBC: CPT | Performed by: EMERGENCY MEDICINE

## 2023-01-23 PROCEDURE — 70450 CT HEAD/BRAIN W/O DYE: CPT

## 2023-01-23 PROCEDURE — 36415 COLL VENOUS BLD VENIPUNCTURE: CPT

## 2023-01-23 PROCEDURE — 85007 BL SMEAR W/DIFF WBC COUNT: CPT | Performed by: EMERGENCY MEDICINE

## 2023-01-23 NOTE — ED PROVIDER NOTES
Subjective   History of Present Illness  Patient is a 70 years old who had a right hemicraniotomy for meningioma since then he has episodes and concerns of swelling of the scalp on the right side with with some pain and supraorbital swelling of the right eyelid has been seen in the ER for same complaints before came in again there is no focal neurological deficit there is no nausea vomiting or shortness of breath no fever no focal neurological deficits no new medications no rashes no erythema no cellulitis and no pruritus    Illness  Location:  Right scalp and supraorbital swelling as per the patient minimal swelling noted by us.  Severity:  Mild  Onset quality:  Gradual  Timing:  Constant  Chronicity:  New  Associated symptoms: no abdominal pain, no chest pain, no congestion, no cough, no diarrhea, no fever, no headaches, no loss of consciousness, no myalgias, no nausea, no rhinorrhea, no shortness of breath, no sore throat, no vomiting and no wheezing        Review of Systems   Constitutional: Negative.  Negative for fever.   HENT: Negative.  Negative for congestion, rhinorrhea and sore throat.    Respiratory: Negative for cough, shortness of breath and wheezing.    Cardiovascular: Negative for chest pain.   Gastrointestinal: Negative.  Negative for abdominal distention, abdominal pain, diarrhea, nausea and vomiting.   Endocrine: Negative.    Genitourinary: Negative.    Musculoskeletal: Negative.  Negative for back pain, myalgias and neck pain.   Skin: Negative for color change and pallor.   Neurological: Negative.  Negative for loss of consciousness, syncope, weakness, light-headedness, numbness and headaches.   Hematological: Negative.  Does not bruise/bleed easily.   All other systems reviewed and are negative.      Past Medical History:   Diagnosis Date   • Brain tumor (HCC)    • Coronary artery disease    • COVID-19 vaccine series completed     MODERNA X 3; LAST DOSE 3/2022   • Diabetes (HCC)    • Erectile  dysfunction    • GERD (gastroesophageal reflux disease)    • Hypercholesteremia    • Hypertension        No Known Allergies    Past Surgical History:   Procedure Laterality Date   • BALLOON ANGIOPLASTY, ARTERY Right    • COLONOSCOPY     • CRANIECTOMY Right 7/1/2022    Procedure: CRANIECTOMY WITH INCISIONAL WASHOUT;  Surgeon: Felipe Banerjee MD;  Location: [facility] OR;  Service: Neurosurgery;  Laterality: Right;   • CRANIOPLASTY Right 10/4/2022    Procedure: CRANIOPLASTY right with Lumbar drain;  Surgeon: Flaco Portillo MD;  Location:  [facility] OR;  Service: Neurosurgery;  Laterality: Right;   • CRANIOTOMY FOR TUMOR Right 6/16/2022    Procedure: CRANIOTOMY FOR TUMOR STERIOTACTIC WITH BRAIN LAB, right;  Surgeon: Flaco Portillo MD;  Location:  [facility] OR;  Service: Neurosurgery;  Laterality: Right;       Family History   Problem Relation Age of Onset   • Cancer Mother         liver   • Heart disease Father        Social History     Socioeconomic History   • Marital status:    Tobacco Use   • Smoking status: Every Day     Packs/day: 0.25     Types: Cigarettes   • Smokeless tobacco: Never   Vaping Use   • Vaping Use: Never used   Substance and Sexual Activity   • Alcohol use: Never   • Drug use: Never   • Sexual activity: Defer           Objective   Physical Exam  Vitals and nursing note reviewed. Exam conducted with a chaperone present.   Constitutional:       General: He is not in acute distress.     Appearance: Normal appearance. He is not ill-appearing or diaphoretic.   HENT:      Head: Normocephalic and atraumatic.      Comments: Scalp has a minimal questionable area of right parietal or frontal softness but there is no erythema no cellulitis no induration no induration no erythema minimal right eyelid swelling no erythema no induration.  But the right eyelid swelling is symmetrical to left eyelid swelling and is probably anatomical     Nose: Nose normal.      Mouth/Throat:      Mouth: Mucous  membranes are moist.      Pharynx: Oropharynx is clear.   Eyes:      General: No visual field deficit or scleral icterus.     Extraocular Movements: Extraocular movements intact.      Conjunctiva/sclera: Conjunctivae normal.      Pupils: Pupils are equal, round, and reactive to light.   Neck:      Vascular: No carotid bruit.   Cardiovascular:      Rate and Rhythm: Normal rate and regular rhythm.      Pulses: Normal pulses.      Heart sounds: No murmur heard.    No friction rub.   Pulmonary:      Effort: Pulmonary effort is normal. No respiratory distress.      Breath sounds: Normal breath sounds. No stridor.   Abdominal:      General: Abdomen is flat. Bowel sounds are normal. There is no distension.      Palpations: There is no mass.      Tenderness: There is no abdominal tenderness.   Musculoskeletal:         General: No swelling or tenderness. Normal range of motion.      Cervical back: Normal range of motion and neck supple. No rigidity. No muscular tenderness.   Lymphadenopathy:      Cervical: No cervical adenopathy.   Skin:     General: Skin is warm.      Capillary Refill: Capillary refill takes less than 2 seconds.      Coloration: Skin is not jaundiced or pale.      Findings: No bruising or rash.   Neurological:      General: No focal deficit present.      Mental Status: He is alert and oriented to person, place, and time. Mental status is at baseline.      GCS: GCS eye subscore is 4. GCS verbal subscore is 5. GCS motor subscore is 6.      Cranial Nerves: No cranial nerve deficit, dysarthria or facial asymmetry.      Sensory: Sensation is intact.      Motor: Motor function is intact. No weakness, abnormal muscle tone, seizure activity or pronator drift.      Coordination: Coordination is intact. Finger-Nose-Finger Test and Heel to Shin Test normal.      Gait: Gait is intact.      Deep Tendon Reflexes: Reflexes are normal and symmetric. Babinski sign absent on the right side. Babinski sign absent on the left  side.      Reflex Scores:       Bicep reflexes are 2+ on the right side and 2+ on the left side.       Patellar reflexes are 2+ on the right side and 2+ on the left side.  Psychiatric:         Attention and Perception: Attention normal.         Mood and Affect: Mood and affect normal.         Speech: Speech normal.         Behavior: Behavior normal.         Procedures           ED Course  ED Course as of 01/23/23 2010 Mon Jan 23, 2023 2005 Repeat examination the patient reveals no significant swelling there is some softness over the scalp but no induration no erythema no cellulitis this does not appear to be angioedema the patient is on ACE inhibitor's but he had similar complaints couple of months ago also.  His CT does not show any increased fluid collection white cell count is slightly elevated with no left first shift chemistry within normal limits there is no anion gap  blood sugar is slightly up [TS]   2006 I have discussed this case with the patient repeat examination unremarkable he is awake alert no distress no shortness of breath no angioedema no cellulitis no erythema no urticaria.  He needs to follow-up with his primary MD for reevaluation reassessment and also follow-up with his neurosurgeon. [TS]      ED Course User Index  [TS] Estevan Valdez MD                                           Medical Decision Making  Hyperglycemia: chronic illness or injury with exacerbation, progression, or side effects of treatment     Details: Patient has got a history of diabetes mellitus will need follow-up with the primary MD there is no anion gap no vomiting.  Scalp pain: chronic illness or injury     Details: Patient has got these recurrent episodes and concerns of scalp discomfort and also some swelling there is no obvious swelling CT scan is negative.  Amount and/or Complexity of Data Reviewed  Labs: ordered.     Details: Labs reviewed  Radiology: ordered.     Details: CT scan was reviewed      Risk  Risk  Details: Discussed this case at length with the patient there is no evidence of cellulitis there is no induration there is no fluctuance there is no angioedema no urticaria CT scan is negative for any intracranial masses etc.  The patient is supposed to follow-up with Dr. Portillo.        Final diagnoses:   Scalp pain   Hyperglycemia       ED Disposition  ED Disposition     ED Disposition   Discharge    Condition   Stable    Comment   --             Brianna Martinez APRN  [address] [facility] [address]  [phone]          Flaco Portillo MD  [address] [facility]  [address]  [facility] [address]  [phone]    Schedule an appointment as soon as possible for a visit            Medication List      ASK your doctor about these medications    dextrose 40 % gel  Commonly known as: GLUTOSE  Take 15 g by mouth Every 15 (Fifteen) Minutes As Needed for Low Blood Sugar (Blood sugar less than 70).     glucagon (human recombinant) 1 MG injection  Commonly known as: GLUCAGEN DIAGNOSTIC  Inject 1 mg into the appropriate muscle as directed by prescriber Every 15 (Fifteen) Minutes As Needed (Blood Glucose Less Than 70) for up to 360 days.     insulin detemir 100 UNIT/ML injection  Commonly known as: LEVEMIR  Inject 30 Units under the skin into the appropriate area as directed Every Night.             Estevan Valdez MD  01/23/23 2010

## 2023-01-25 ENCOUNTER — LAB (OUTPATIENT)
Dept: LAB | Facility: HOSPITAL | Age: 68
End: 2023-01-25
Payer: MEDICARE

## 2023-01-25 ENCOUNTER — OFFICE VISIT (OUTPATIENT)
Dept: NEUROSURGERY | Facility: CLINIC | Age: 68
End: 2023-01-25
Payer: MEDICARE

## 2023-01-25 VITALS — HEIGHT: 70 IN | WEIGHT: 264.4 LBS | BODY MASS INDEX: 37.85 KG/M2

## 2023-01-25 DIAGNOSIS — Z72.0 TOBACCO ABUSE: ICD-10-CM

## 2023-01-25 DIAGNOSIS — R60.0 FACIAL EDEMA: Primary | ICD-10-CM

## 2023-01-25 DIAGNOSIS — E66.01 CLASS 2 SEVERE OBESITY DUE TO EXCESS CALORIES WITH SERIOUS COMORBIDITY AND BODY MASS INDEX (BMI) OF 37.0 TO 37.9 IN ADULT: ICD-10-CM

## 2023-01-25 DIAGNOSIS — Z98.890 STATUS POST CRANIECTOMY: ICD-10-CM

## 2023-01-25 DIAGNOSIS — D32.9 MENINGIOMA: ICD-10-CM

## 2023-01-25 DIAGNOSIS — R60.0 FACIAL EDEMA: ICD-10-CM

## 2023-01-25 LAB
ALBUMIN SERPL-MCNC: 3.8 G/DL (ref 3.5–5.2)
ALBUMIN/GLOB SERPL: 1.2 G/DL
ALP SERPL-CCNC: 134 U/L (ref 39–117)
ALT SERPL W P-5'-P-CCNC: 16 U/L (ref 1–41)
ANION GAP SERPL CALCULATED.3IONS-SCNC: 11.8 MMOL/L (ref 5–15)
AST SERPL-CCNC: 19 U/L (ref 1–40)
BASOPHILS # BLD AUTO: 0.05 10*3/MM3 (ref 0–0.2)
BASOPHILS NFR BLD AUTO: 0.5 % (ref 0–1.5)
BILIRUB SERPL-MCNC: 0.4 MG/DL (ref 0–1.2)
BUN SERPL-MCNC: 17 MG/DL (ref 8–23)
BUN/CREAT SERPL: 17.9 (ref 7–25)
CALCIUM SPEC-SCNC: 8.8 MG/DL (ref 8.6–10.5)
CHLORIDE SERPL-SCNC: 102 MMOL/L (ref 98–107)
CO2 SERPL-SCNC: 24.2 MMOL/L (ref 22–29)
CREAT SERPL-MCNC: 0.95 MG/DL (ref 0.76–1.27)
CRP SERPL-MCNC: 3.9 MG/DL (ref 0–0.5)
DEPRECATED RDW RBC AUTO: 41.8 FL (ref 37–54)
EGFRCR SERPLBLD CKD-EPI 2021: 87.7 ML/MIN/1.73
EOSINOPHIL # BLD AUTO: 0.05 10*3/MM3 (ref 0–0.4)
EOSINOPHIL NFR BLD AUTO: 0.5 % (ref 0.3–6.2)
ERYTHROCYTE [DISTWIDTH] IN BLOOD BY AUTOMATED COUNT: 11.9 % (ref 12.3–15.4)
GLOBULIN UR ELPH-MCNC: 3.3 GM/DL
GLUCOSE SERPL-MCNC: 327 MG/DL (ref 65–99)
HCT VFR BLD AUTO: 42.5 % (ref 37.5–51)
HGB BLD-MCNC: 13.5 G/DL (ref 13–17.7)
IMM GRANULOCYTES # BLD AUTO: 0.05 10*3/MM3 (ref 0–0.05)
IMM GRANULOCYTES NFR BLD AUTO: 0.5 % (ref 0–0.5)
LYMPHOCYTES # BLD AUTO: 2.03 10*3/MM3 (ref 0.7–3.1)
LYMPHOCYTES NFR BLD AUTO: 21.2 % (ref 19.6–45.3)
MCH RBC QN AUTO: 30.5 PG (ref 26.6–33)
MCHC RBC AUTO-ENTMCNC: 31.8 G/DL (ref 31.5–35.7)
MCV RBC AUTO: 95.9 FL (ref 79–97)
MONOCYTES # BLD AUTO: 0.56 10*3/MM3 (ref 0.1–0.9)
MONOCYTES NFR BLD AUTO: 5.8 % (ref 5–12)
NEUTROPHILS NFR BLD AUTO: 6.84 10*3/MM3 (ref 1.7–7)
NEUTROPHILS NFR BLD AUTO: 71.5 % (ref 42.7–76)
NRBC BLD AUTO-RTO: 0 /100 WBC (ref 0–0.2)
PLATELET # BLD AUTO: 202 10*3/MM3 (ref 140–450)
PMV BLD AUTO: 11.9 FL (ref 6–12)
POTASSIUM SERPL-SCNC: 4.5 MMOL/L (ref 3.5–5.2)
PROT SERPL-MCNC: 7.1 G/DL (ref 6–8.5)
RBC # BLD AUTO: 4.43 10*6/MM3 (ref 4.14–5.8)
SODIUM SERPL-SCNC: 138 MMOL/L (ref 136–145)
WBC NRBC COR # BLD: 9.58 10*3/MM3 (ref 3.4–10.8)

## 2023-01-25 PROCEDURE — 80053 COMPREHEN METABOLIC PANEL: CPT

## 2023-01-25 PROCEDURE — 85025 COMPLETE CBC W/AUTO DIFF WBC: CPT

## 2023-01-25 PROCEDURE — 36415 COLL VENOUS BLD VENIPUNCTURE: CPT

## 2023-01-25 PROCEDURE — 99214 OFFICE O/P EST MOD 30 MIN: CPT | Performed by: NURSE PRACTITIONER

## 2023-01-25 PROCEDURE — 86140 C-REACTIVE PROTEIN: CPT
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
## 2023-02-08 ENCOUNTER — HOSPITAL ENCOUNTER (OUTPATIENT)
Dept: GENERAL RADIOLOGY | Facility: HOSPITAL | Age: 68
Discharge: HOME | End: 2023-02-08
Payer: MEDICARE

## 2023-02-08 DIAGNOSIS — R60.0 FACIAL EDEMA: ICD-10-CM

## 2023-02-08 DIAGNOSIS — D32.9 MENINGIOMA: ICD-10-CM

## 2023-02-08 DIAGNOSIS — Z98.890 STATUS POST CRANIECTOMY: ICD-10-CM

## 2023-02-14 ENCOUNTER — HOSPITAL ENCOUNTER (OUTPATIENT)
Dept: GENERAL RADIOLOGY | Facility: HOSPITAL | Age: 68
Discharge: HOME | End: 2023-02-14
Admitting: RADIOLOGY
Payer: MEDICARE

## 2023-02-14 VITALS
TEMPERATURE: 97.5 F | OXYGEN SATURATION: 95 % | BODY MASS INDEX: 37.08 KG/M2 | DIASTOLIC BLOOD PRESSURE: 66 MMHG | SYSTOLIC BLOOD PRESSURE: 113 MMHG | HEIGHT: 70 IN | HEART RATE: 64 BPM | WEIGHT: 259 LBS | RESPIRATION RATE: 13 BRPM

## 2023-02-14 DIAGNOSIS — R60.0 FACIAL EDEMA: ICD-10-CM

## 2023-02-14 DIAGNOSIS — Z98.890 STATUS POST CRANIECTOMY: ICD-10-CM

## 2023-02-14 DIAGNOSIS — D32.9 MENINGIOMA: ICD-10-CM

## 2023-02-14 LAB
APPEARANCE CSF: ABNORMAL
APPEARANCE CSF: NORMAL
APTT PPP: 21.8 SECONDS (ref 24.1–35)
COLOR CSF: NORMAL
COLOR CSF: YELLOW
COLOR SPUN CSF: COLORLESS
GLUCOSE CSF-MCNC: 160 MG/DL (ref 40–70)
INR PPP: 0.94 (ref 0.91–1.09)
METHOD: ABNORMAL
METHOD: NORMAL
NUC CELL # CSF MANUAL: 3 /MM3 (ref 0–8)
NUC CELL # CSF MANUAL: 3 /MM3 (ref 0–8)
PLATELET # BLD AUTO: 215 10*3/MM3 (ref 140–450)
PROT CSF-MCNC: 114.3 MG/DL (ref 15–45)
PROTHROMBIN TIME: 12.6 SECONDS (ref 11.8–14.8)
RBC # CSF MANUAL: 0 /MM3 (ref 0–0)
RBC # CSF MANUAL: 0 /MM3 (ref 0–0)
SPECIMEN VOL CSF: 12 ML
SPECIMEN VOL CSF: 12 ML
TUBE # CSF: 1
TUBE # CSF: 2

## 2023-02-14 PROCEDURE — 87206 SMEAR FLUORESCENT/ACID STAI: CPT | Performed by: NURSE PRACTITIONER

## 2023-02-14 PROCEDURE — 82945 GLUCOSE OTHER FLUID: CPT | Performed by: NURSE PRACTITIONER

## 2023-02-14 PROCEDURE — 87116 MYCOBACTERIA CULTURE: CPT | Performed by: NURSE PRACTITIONER

## 2023-02-14 PROCEDURE — 87070 CULTURE OTHR SPECIMN AEROBIC: CPT | Performed by: NURSE PRACTITIONER

## 2023-02-14 PROCEDURE — 85730 THROMBOPLASTIN TIME PARTIAL: CPT | Performed by: RADIOLOGY

## 2023-02-14 PROCEDURE — 87205 SMEAR GRAM STAIN: CPT | Performed by: NURSE PRACTITIONER

## 2023-02-14 PROCEDURE — 87015 SPECIMEN INFECT AGNT CONCNTJ: CPT | Performed by: NURSE PRACTITIONER

## 2023-02-14 PROCEDURE — 84157 ASSAY OF PROTEIN OTHER: CPT | Performed by: NURSE PRACTITIONER

## 2023-02-14 PROCEDURE — 85049 AUTOMATED PLATELET COUNT: CPT | Performed by: RADIOLOGY

## 2023-02-14 PROCEDURE — 85610 PROTHROMBIN TIME: CPT | Performed by: RADIOLOGY

## 2023-02-14 PROCEDURE — 89050 BODY FLUID CELL COUNT: CPT | Performed by: NURSE PRACTITIONER

## 2023-02-14 RX ORDER — INSULIN ASPART 100 [IU]/ML
INJECTION, SOLUTION INTRAVENOUS; SUBCUTANEOUS
Status: ON HOLD | COMMUNITY
End: 2023-03-31

## 2023-02-14 NOTE — NURSING NOTE
MD discharge orders noted. Patient AAOx4 and in NAD. V/s stable. The patient successfully ambulated to the restroom without any adverse events. IV and tele discontinued. An AVS was provided and discussed completely including the following: f/u appointments, changes to the med recc, and education pertinent to this visit. The patient verbalized their understanding and was escorted via wheelchair to their ride's private vehicle.

## 2023-02-17 LAB
BACTERIA SPEC AEROBE CULT: NORMAL
GRAM STN SPEC: NORMAL
GRAM STN SPEC: NORMAL

## 2023-03-28 LAB
MYCOBACTERIUM SPEC CULT: NORMAL
NIGHT BLUE STAIN TISS: NORMAL

## 2023-03-30 ENCOUNTER — TELEPHONE (OUTPATIENT)
Dept: NEUROSURGERY | Facility: CLINIC | Age: 68
End: 2023-03-30
Payer: MEDICARE

## 2023-03-30 NOTE — TELEPHONE ENCOUNTER
This is just a pseudomeningocele.  As long as the incision doesn't open, it is fine.  It will stop happening once it scars down.  Have him come in to see Rahul for a wound check.

## 2023-03-31 ENCOUNTER — OFFICE VISIT (OUTPATIENT)
Dept: NEUROSURGERY | Facility: CLINIC | Age: 68
End: 2023-03-31
Payer: MEDICARE

## 2023-03-31 ENCOUNTER — LAB (OUTPATIENT)
Dept: LAB | Facility: HOSPITAL | Age: 68
DRG: 3 | End: 2023-03-31
Payer: MEDICARE

## 2023-03-31 VITALS — WEIGHT: 259 LBS | BODY MASS INDEX: 37.08 KG/M2 | HEIGHT: 70 IN

## 2023-03-31 DIAGNOSIS — D32.9 MENINGIOMA: ICD-10-CM

## 2023-03-31 DIAGNOSIS — Z98.890 STATUS POST CRANIECTOMY: ICD-10-CM

## 2023-03-31 DIAGNOSIS — E66.01 CLASS 2 SEVERE OBESITY DUE TO EXCESS CALORIES WITH SERIOUS COMORBIDITY AND BODY MASS INDEX (BMI) OF 38.0 TO 38.9 IN ADULT: ICD-10-CM

## 2023-03-31 DIAGNOSIS — D32.9 MENINGIOMA: Primary | ICD-10-CM

## 2023-03-31 DIAGNOSIS — F17.200 SMOKER: ICD-10-CM

## 2023-03-31 PROBLEM — R60.0 FACIAL EDEMA: Status: ACTIVE | Noted: 2023-01-01

## 2023-03-31 PROBLEM — R22.0 FACIAL SWELLING: Status: ACTIVE | Noted: 2023-03-31

## 2023-03-31 LAB
ALBUMIN SERPL-MCNC: 3.8 G/DL (ref 3.5–5.2)
ALBUMIN/GLOB SERPL: 1 G/DL
ALP SERPL-CCNC: 103 U/L (ref 39–117)
ALT SERPL W P-5'-P-CCNC: 11 U/L (ref 1–41)
ANION GAP SERPL CALCULATED.3IONS-SCNC: 15 MMOL/L (ref 5–15)
AST SERPL-CCNC: 30 U/L (ref 1–40)
BASOPHILS # BLD AUTO: 0.05 10*3/MM3 (ref 0–0.2)
BASOPHILS NFR BLD AUTO: 0.3 % (ref 0–1.5)
BILIRUB SERPL-MCNC: 1 MG/DL (ref 0–1.2)
BUN SERPL-MCNC: 22 MG/DL (ref 8–23)
BUN/CREAT SERPL: 18.6 (ref 7–25)
CALCIUM SPEC-SCNC: 8.9 MG/DL (ref 8.6–10.5)
CHLORIDE SERPL-SCNC: 100 MMOL/L (ref 98–107)
CO2 SERPL-SCNC: 23 MMOL/L (ref 22–29)
CREAT SERPL-MCNC: 1.18 MG/DL (ref 0.76–1.27)
CRP SERPL-MCNC: 14.75 MG/DL (ref 0–0.5)
DEPRECATED RDW RBC AUTO: 41.9 FL (ref 37–54)
EGFRCR SERPLBLD CKD-EPI 2021: 67.6 ML/MIN/1.73
EOSINOPHIL # BLD AUTO: 0.01 10*3/MM3 (ref 0–0.4)
EOSINOPHIL NFR BLD AUTO: 0.1 % (ref 0.3–6.2)
ERYTHROCYTE [DISTWIDTH] IN BLOOD BY AUTOMATED COUNT: 12 % (ref 12.3–15.4)
GLOBULIN UR ELPH-MCNC: 3.7 GM/DL
GLUCOSE SERPL-MCNC: 243 MG/DL (ref 65–99)
HCT VFR BLD AUTO: 40.8 % (ref 37.5–51)
HGB BLD-MCNC: 13.5 G/DL (ref 13–17.7)
IMM GRANULOCYTES # BLD AUTO: 0.09 10*3/MM3 (ref 0–0.05)
IMM GRANULOCYTES NFR BLD AUTO: 0.6 % (ref 0–0.5)
LYMPHOCYTES # BLD AUTO: 2.28 10*3/MM3 (ref 0.7–3.1)
LYMPHOCYTES NFR BLD AUTO: 14.9 % (ref 19.6–45.3)
MCH RBC QN AUTO: 31.1 PG (ref 26.6–33)
MCHC RBC AUTO-ENTMCNC: 33.1 G/DL (ref 31.5–35.7)
MCV RBC AUTO: 94 FL (ref 79–97)
MONOCYTES # BLD AUTO: 1.66 10*3/MM3 (ref 0.1–0.9)
MONOCYTES NFR BLD AUTO: 10.8 % (ref 5–12)
NEUTROPHILS NFR BLD AUTO: 11.23 10*3/MM3 (ref 1.7–7)
NEUTROPHILS NFR BLD AUTO: 73.3 % (ref 42.7–76)
NRBC BLD AUTO-RTO: 0 /100 WBC (ref 0–0.2)
PLATELET # BLD AUTO: 214 10*3/MM3 (ref 140–450)
PMV BLD AUTO: 11 FL (ref 6–12)
POTASSIUM SERPL-SCNC: 4 MMOL/L (ref 3.5–5.2)
PROT SERPL-MCNC: 7.5 G/DL (ref 6–8.5)
RBC # BLD AUTO: 4.34 10*6/MM3 (ref 4.14–5.8)
SODIUM SERPL-SCNC: 138 MMOL/L (ref 136–145)
WBC NRBC COR # BLD: 15.32 10*3/MM3 (ref 3.4–10.8)

## 2023-03-31 PROCEDURE — 85025 COMPLETE CBC W/AUTO DIFF WBC: CPT

## 2023-03-31 PROCEDURE — 80053 COMPREHEN METABOLIC PANEL: CPT

## 2023-03-31 PROCEDURE — 36415 COLL VENOUS BLD VENIPUNCTURE: CPT

## 2023-03-31 PROCEDURE — 86140 C-REACTIVE PROTEIN: CPT

## 2023-03-31 RX ORDER — METOPROLOL TARTRATE 50 MG/1
50 TABLET, FILM COATED ORAL 2 TIMES DAILY WITH MEALS
Status: ON HOLD | COMMUNITY
Start: 2023-03-09

## 2023-03-31 RX ORDER — HYDROCODONE BITARTRATE AND ACETAMINOPHEN 10; 325 MG/1; MG/1
1 TABLET ORAL 4 TIMES DAILY PRN
Status: ON HOLD | COMMUNITY
Start: 2023-03-06

## 2023-03-31 RX ORDER — SOTALOL HYDROCHLORIDE 120 MG/1
120 TABLET ORAL 2 TIMES DAILY
Status: ON HOLD | COMMUNITY
Start: 2023-03-29
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
## 2023-03-31 NOTE — LETTER
To Whom It May Concern,      Please excuse Brett Zoë from work for 4/17/23 due to family in hospital.       Thank you,

## 2023-03-31 NOTE — H&P
NEUROSURGERY INITIAL HOSPITAL ENCOUNTER    Assessment/Plan:   Elijah Escobar is a 67 y.o. male with a significant medical history of hypertension, diabetes, hyperlipidemia, seizures, craniotomy for tumor (6/16/2022), craniotomy for washout (7/1/2022), craniectomy (10/4/2022), coronary artery disease, diabetes, erectile dysfunction, GERD, hypertension, hyperlipidemia, tobacco abuse, and obesity.  He presents today with a complaint of a 4-5 days onset of progressively worsening right frontal, temporal, and periorbital edema and associated symptoms to include, but not limited to generalized fatigue, chills, dyspnea, intermittent nausea without vomiting, and states he's felt feverish.  Physical exam findings of neurologically intact with right frontal, temporal, and periorbital swelling.  WBC elevated at 15.  3 to an CRP elevated at 14.75.  Imaging pending.    Differential Diagnosis:   Pseudomeningocele versus recurrent intracranial infection    Recommendations:  Admit  Routine labs and blood cultures  CT of the head  MRI of the brain with and without  IR LP for CSF  Hold antibiotics until labs and CSF can be obtained.  Neuro exams per policy.  Call for decline.  NPO for now  Additional recommendations to be follow-up with Dr. Portillo.    Thank you very much for this interesting consult.   ___________________________________________________________________    Chief Complaint: Increased facial swelling    HPI: Elijah Escobar is a 67 y.o. male with a significant medical history of hypertension, diabetes, hyperlipidemia, seizures, craniotomy for tumor (6/16/2022), craniotomy for washout (7/1/2022), craniectomy (10/4/2022), coronary artery disease, diabetes, erectile dysfunction, GERD, hypertension, hyperlipidemia, tobacco abuse, and obesity.      On 6/16/2022 Mr. Escobar underwent right frontal craniotomy for removal of WHO grade I meningioma.  He initially did well postoperatively, however presented to [facility]  [place] ED on 6/30/2022 with complaints of persistent right-sided headache and dizziness with standing that was found to have intracranial infection necessitating incision/drainage and washout and craniotomy on 7/1/2022.  During this admission he was evaluated by infectious disease and placed on cefepime which he completed.  On 10/4/2022 he returned to the operating room for uneventful cranioplasty.  Mr. Escobar was evaluated in the emergency department on 1/23/2023 for worsening right frontal and orbital swelling.  WBC was slightly elevated at 13.40 and CT of the head shows no evidence of acute abnormalities, however there is a slight increase in extracranial and intracranial fluid adjacent to the cranial plate.    Elijah had been well since he was last evaluated on 1/25/2023 until approximately 4-5 days ago when he developed progressively worsening right frontal, temporal, and periorbital edema. He additionally report generalized fatigue, chills, dyspnea, intermittent nausea without vomiting, and states he's felt feverish.  Elijah denies dizziness, seizure-like activity, confusion, facial asymmetry or dysesthesias and unilateral weakness.  He presents today in a wheelchair, however typically ambulates at home with a cane.  He does report falling from his shower chair 2 days ago, however denies hitting his head.    He currently rates the severity of his symptoms 8/10.  No additional concerns at this time.    Review of Systems   Constitutional: Positive for chills, fatigue and fever.   Eyes: Negative.  Negative for visual disturbance.   Respiratory: Negative.    Cardiovascular: Negative.    Gastrointestinal: Positive for nausea. Negative for vomiting.   Endocrine: Negative.    Genitourinary: Negative.    Musculoskeletal: Negative.    Skin: Negative.    Allergic/Immunologic: Negative.    Neurological: Positive for headaches. Negative for dizziness, tremors, seizures, syncope, facial asymmetry, speech difficulty,  weakness, light-headedness and numbness.   Hematological: Negative.    Psychiatric/Behavioral: Negative.    All other systems reviewed and are negative.     Past Medical History:  has a past medical history of Brain tumor (HCC), Coronary artery disease, COVID-19 vaccine series completed, Diabetes (HCC), Erectile dysfunction, GERD (gastroesophageal reflux disease), Hypercholesteremia, Hypertension, and Seizure (HCC).    Past Surgical History:  has a past surgical history that includes Colonoscopy; Balloon angioplasty, artery (Right); Craniotomy for Tumor (Right, 6/16/2022); Craniectomy (Right, 7/1/2022); and Cranioplasty (Right, 10/4/2022).    Family History: family history includes Cancer in his mother; Heart disease in his father.    Social History:  reports that he has been smoking cigarettes. He has been smoking an average of .25 packs per day. He has never used smokeless tobacco. He reports that he does not drink alcohol and does not use drugs.    Allergies: Patient has no known allergies.    Home Medications:   Current Facility-Administered Medications:   •  acetaminophen (TYLENOL) tablet 650 mg, 650 mg, Oral, Q4H PRN **OR** acetaminophen (TYLENOL) 160 MG/5ML solution 650 mg, 650 mg, Oral, Q4H PRN **OR** acetaminophen (TYLENOL) suppository 650 mg, 650 mg, Rectal, Q4H PRN, Rahul Arnold, APRN  •  amLODIPine (NORVASC) tablet 10 mg, 10 mg, Oral, Q24H, Rahul Arnold, APRN  •  aspirin chewable tablet 81 mg, 81 mg, Oral, Daily, Rahul Arnold, APRN  •  atorvastatin (LIPITOR) tablet 20 mg, 20 mg, Oral, Nightly, Rahul Arnold, APRN  •  butalbital-acetaminophen-caffeine (FIORICET, ESGIC) -40 MG per tablet 1 tablet, 1 tablet, Oral, Q6H PRN, Rahul Arnold, APRN  •  dextrose (D50W) (25 g/50 mL) IV injection 25 g, 25 g, Intravenous, Q15 Min PRN, Rahul Arnold, APRN  •  dextrose (GLUTOSE) oral gel 15 g, 15 g, Oral, Q15 Min PRN, Rahul Arnold M, APRN  •  gabapentin (NEURONTIN) capsule 300 mg, 300 mg, Oral, TID, Rust, Rahul  ADITYA OLMEDO  •  glucagon (human recombinant) (GLUCAGEN DIAGNOSTIC) injection 1 mg, 1 mg, Intramuscular, Q15 Min PRN, Rahul Arnold APRN  •  heparin (porcine) 5000 UNIT/ML injection 5,000 Units, 5,000 Units, Subcutaneous, Q12H, Rahul Arnold, ADITYA  •  Insulin Lispro (humaLOG) injection 0-24 Units, 0-24 Units, Subcutaneous, TID AC, Rahul Arnold APRN  •  levETIRAcetam (KEPPRA) tablet 500 mg, 500 mg, Oral, BID, Rahul Arnold APRN  •  lisinopril (PRINIVIL,ZESTRIL) tablet 20 mg, 20 mg, Oral, Daily, Rahul Arnold APRN  •  metoprolol tartrate (LOPRESSOR) tablet 50 mg, 50 mg, Oral, Q12H, Rahul Arnold APRN  •  nicotine (NICODERM CQ) 14 MG/24HR patch 1 patch, 1 patch, Transdermal, Daily PRN, Rahul Arnold APRN  •  ondansetron (ZOFRAN) tablet 4 mg, 4 mg, Oral, Q6H PRN **OR** ondansetron (ZOFRAN) injection 4 mg, 4 mg, Intravenous, Q6H PRN, Rahul Arnold APRN  •  oxyCODONE-acetaminophen (PERCOCET)  MG per tablet 1 tablet, 1 tablet, Oral, Q4H PRN, Rahul Arnold APRN  •  oxyCODONE-acetaminophen (PERCOCET) 7.5-325 MG per tablet 1 tablet, 1 tablet, Oral, Q4H PRN, Rahul Arnold APRN  •  [START ON 4/1/2023] pantoprazole (PROTONIX) EC tablet 40 mg, 40 mg, Oral, Q AM, Rahul Arnold APRN  •  polyethylene glycol (MIRALAX) packet 17 g, 17 g, Oral, Daily, Rahul Arnold, ADITYA  •  sennosides-docusate (PERICOLACE) 8.6-50 MG per tablet 1 tablet, 1 tablet, Oral, Daily, Rahul Arnold APRN  •  sodium chloride 0.9 % flush 10 mL, 10 mL, Intravenous, Q12H, Rahul Arnold, APRN  •  sodium chloride 0.9 % flush 10 mL, 10 mL, Intravenous, PRN, Rahul Arnold, APRN  •  sodium chloride 0.9 % infusion 40 mL, 40 mL, Intravenous, PRN, Rahul Arnold, APRN    Medications: Scheduled Meds:amLODIPine, 10 mg, Oral, Q24H  aspirin, 81 mg, Oral, Daily  atorvastatin, 20 mg, Oral, Nightly  gabapentin, 300 mg, Oral, TID  heparin (porcine), 5,000 Units, Subcutaneous, Q12H  insulin lispro, 0-24 Units, Subcutaneous, TID AC  levETIRAcetam, 500 mg, Oral,  BID  lisinopril, 20 mg, Oral, Daily  metoprolol tartrate, 50 mg, Oral, Q12H  [START ON 4/1/2023] pantoprazole, 40 mg, Oral, Q AM  polyethylene glycol, 17 g, Oral, Daily  sennosides-docusate, 1 tablet, Oral, Daily  sodium chloride, 10 mL, Intravenous, Q12H      Continuous Infusions:   PRN Meds:.•  acetaminophen **OR** acetaminophen **OR** acetaminophen  •  butalbital-acetaminophen-caffeine  •  dextrose  •  dextrose  •  glucagon (human recombinant)  •  nicotine  •  ondansetron **OR** ondansetron  •  oxyCODONE-acetaminophen  •  oxyCODONE-acetaminophen  •  sodium chloride  •  sodium chloride    Vital Signs  Temp:  [98.5 °F (36.9 °C)] 98.5 °F (36.9 °C)  Heart Rate:  [79] 79  Resp:  [18] 18  BP: (117)/(98) 117/98    Physical Exam  Physical Exam  Vitals and nursing note reviewed.   Constitutional:       General: He is not in acute distress.     Appearance: Normal appearance. He is well-developed and well-groomed. He is obese. He is not ill-appearing, toxic-appearing or diaphoretic.      Comments: BMI 37.16   HENT:      Head: Normocephalic and atraumatic.        Right Ear: Hearing normal.      Left Ear: Hearing normal.   Eyes:      Extraocular Movements: EOM normal.      Conjunctiva/sclera: Conjunctivae normal.      Pupils: Pupils are equal, round, and reactive to light.   Neck:      Trachea: Trachea normal.   Cardiovascular:      Rate and Rhythm: Normal rate and regular rhythm.   Pulmonary:      Effort: Pulmonary effort is normal. No tachypnea, bradypnea, accessory muscle usage or respiratory distress.   Abdominal:      Palpations: Abdomen is soft.   Musculoskeletal:      Cervical back: Full passive range of motion without pain and neck supple.   Skin:     General: Skin is warm and dry.   Neurological:      Mental Status: He is alert and oriented to person, place, and time.      GCS: GCS eye subscore is 4. GCS verbal subscore is 5. GCS motor subscore is 6.      Gait: Gait is intact.   Psychiatric:         Speech: Speech  normal.         Behavior: Behavior normal. Behavior is cooperative.         Neurologic Exam     Mental Status   Oriented to person, place, and time.   Attention: normal. Concentration: normal.   Speech: speech is normal   Level of consciousness: alert    Cranial Nerves     CN II   Visual fields full to confrontation.     CN III, IV, VI   Pupils are equal, round, and reactive to light.  Extraocular motions are normal.     CN V   Facial sensation intact.     CN VII   Facial expression full, symmetric.     CN VIII   CN VIII normal.     CN IX, X   CN IX normal.     CN XI   CN XI normal.     Motor Exam   Right arm tone: normal  Left arm tone: normal  Right arm pronator drift: absent  Left arm pronator drift: absent  Right leg tone: normal  Left leg tone: normal    Strength   Right deltoid: 5/5  Left deltoid: 5/5  Right biceps: 5/5  Left biceps: 5/5  Right triceps: 5/5  Left triceps: 5/5  Right wrist extension: 5/5  Left wrist extension: 5/5  Right iliopsoas: 5/5  Left iliopsoas: 5/5  Right quadriceps: 5/5  Left quadriceps: 5/5  Right anterior tibial: 5/5  Left anterior tibial: 5/5  Right gastroc: 5/5  Left gastroc: 5/5  Right EHL 5/5  Left EHL 5/5       Sensory Exam   Right arm light touch: normal  Left arm light touch: normal  Right leg light touch: normal  Left leg light touch: normal    Gait, Coordination, and Reflexes     Gait  Gait: normal    Tremor   Resting tremor: absent  Intention tremor: absent  Action tremor: absent    Results Review:   Independent review and interpretation of imaging  Imaging Results (Last 24 Hours)     ** No results found for the last 24 hours. **        Lab Results   Component Value Date    WBC 15.32 (H) 03/31/2023    RBC 4.34 03/31/2023    HGB 13.5 03/31/2023    HCT 40.8 03/31/2023     03/31/2023     Lab Results   Component Value Date    GLUCOSE 243 (H) 03/31/2023    CREATININE 1.18 03/31/2023     03/31/2023    K 4.0 03/31/2023    ALT 11 03/31/2023    AST 30 03/31/2023     ALKPHOS 103 03/31/2023    EGFRIFNONA >60.0 10/21/2020     Lab Results   Component Value Date    CRP 14.75 (H) 03/31/2023    CRP 3.90 (H) 01/25/2023    CRP 0.32 12/05/2022    CRP <0.30 09/26/2022    CRP 0.41 07/21/2022    CRP 0.86 (H) 07/19/2022     MRI brain:  MRI spine:   CT Head:  CT c-spine:  CT t-spine:  CT l-spine:  X-ray:    I reviewed the patient's new clinical results.  Lab Results (last 24 hours)     ** No results found for the last 24 hours. **          Rahul Arnold, APRN

## 2023-03-31 NOTE — NURSING NOTE
IV attempt x2 by Anayeli WALTERS with no success. Valerie WALTERS also attempted with no success. Pt states that IV insert usually takes multiple attempts. VAT consult placed.

## 2023-03-31 NOTE — PATIENT INSTRUCTIONS
Advance Care Planning and Advance Directives     You make decisions on a daily basis - decisions about where you want to live, your career, your home, your life. Perhaps one of the most important decisions you face is your choice for future medical care. Take time to talk with your family and your healthcare team and start planning today.  Advance Care Planning is a process that can help you:  Understand possible future healthcare decisions in light of your own experiences  Reflect on those decision in light of your goals and values  Discuss your decisions with those closest to you and the healthcare professionals that care for you  Make a plan by creating a document that reflects your wishes    Surrogate Decision Maker  In the event of a medical emergency, which has left you unable to communicate or to make your own decisions, you would need someone to make decisions for you.  It is important to discuss your preferences for medical treatment with this person while you are in good health.     Qualities of a surrogate decision maker:  Willing to take on this role and responsibility  Knows what you want for future medical care  Willing to follow your wishes even if they don't agree with them  Able to make difficult medical decisions under stressful circumstances    Advance Directives  These are legal documents you can create that will guide your healthcare team and decision maker(s) when needed. These documents can be stored in the electronic medical record.    Living Will - a legal document to guide your care if you have a terminal condition or a serious illness and are unable to communicate. States vary by statute in document names/types, but most forms may include one or more of the following:        -  Directions regarding life-prolonging treatments        -  Directions regarding artificially provided nutrition/hydration        -  Choosing a healthcare decision maker        -  Direction regarding organ/tissue  donation    Durable Power of  for Healthcare - this document names an -in-fact to make medical decisions for you, but it may also allow this person to make personal and financial decisions for you. Please seek the advice of an  if you need this type of document.    **Advance Directives are not required and no one may discriminate against you if you do not sign one.    Medical Orders  Many states allow specific forms/orders signed by your physician to record your wishes for medical treatment in your current state of health. This form, signed in personal communication with your physician, addresses resuscitation and other medical interventions that you may or may not want.      For more information or to schedule a time with a [facility] Advance Care Planning Facilitator contact: [facility] or call [phone] and someone will contact you directly.BMI for Adults  What is BMI?  Body mass index (BMI) is a number that is calculated from a person's weight and height. BMI can help estimate how much of a person's weight is composed of fat. BMI does not measure body fat directly. Rather, it is an alternative to procedures that directly measure body fat, which can be difficult and expensive.  BMI can help identify people who may be at higher risk for certain medical problems.  What are BMI measurements used for?  BMI is used as a screening tool to identify possible weight problems. It helps determine whether a person is obese, overweight, a healthy weight, or underweight.  BMI is useful for:  Identifying a weight problem that may be related to a medical condition or may increase the risk for medical problems.  Promoting changes, such as changes in diet and exercise, to help reach a healthy weight. BMI screening can be repeated to see if these changes are working.  How is BMI calculated?  BMI involves measuring your weight in relation to your height. Both height and weight are measured,  "and the BMI is calculated from those numbers. This can be done either in English ([place] or metric measurements. Note that charts and online BMI calculators are available to help you find your BMI quickly and easily without having to do these calculations yourself.  To calculate your BMI in English ([place] measurements:    Measure your weight in pounds (lb).  Multiply the number of pounds by 703.  For example, for a person who weighs 180 lb, multiply that number by 703, which equals [phone],540.  Measure your height in inches. Then multiply that number by itself to get a measurement called \"inches squared.\"  For example, for a person who is 70 inches tall, the \"inches squared\" measurement is 70 inches x 70 inches, which equals 4,900 inches squared.  Divide the total from step 2 (number of lb x 703) by the total from step 3 (inches squared): 126,540 ÷ 4,900 = 25.8. This is your BMI.    To calculate your BMI in metric measurements:  Measure your weight in kilograms (kg).  Measure your height in meters (m). Then multiply that number by itself to get a measurement called \"meters squared.\"  For example, for a person who is 1.75 m tall, the \"meters squared\" measurement is 1.75 m x 1.75 m, which is equal to 3.1 meters squared.  Divide the number of kilograms (your weight) by the meters squared number. In this example: 70 ÷ 3.1 = 22.6. This is your BMI.  What do the results mean?  BMI charts are used to identify whether you are underweight, normal weight, overweight, or obese. The following guidelines will be used:  Underweight: BMI less than 18.5.  Normal weight: BMI between 18.5 and 24.9.  Overweight: BMI between 25 and 29.9.  Obese: BMI of 30 or above.  Keep these notes in mind:  Weight includes both fat and muscle, so someone with a muscular build, such as an athlete, may have a BMI that is higher than 24.9. In cases like these, BMI is not an accurate measure of body fat.  To determine if excess body fat is the cause of a BMI " of 25 or higher, further assessments may need to be done by a health care provider.  BMI is usually interpreted in the same way for men and women.  Where to find more information  For more information about BMI, including tools to quickly calculate your BMI, go to these websites:  Centers for Disease Control and Prevention: www.cdc.gov  American Heart Association: www.heart.org  National Heart, Lung, and Blood Joy: www.nhlbi.nih.gov  Summary  Body mass index (BMI) is a number that is calculated from a person's weight and height.  BMI may help estimate how much of a person's weight is composed of fat. BMI can help identify those who may be at higher risk for certain medical problems.  BMI can be measured using English measurements or metric measurements.  BMI charts are used to identify whether you are underweight, normal weight, overweight, or obese.  This information is not intended to replace advice given to you by your health care provider. Make sure you discuss any questions you have with your health care provider.  Document Revised: 09/09/2020 Document Reviewed: 07/17/2020  [employer] Patient Education © 2021 [employer]

## 2023-03-31 NOTE — PLAN OF CARE
Goal Outcome Evaluation:  Plan of Care Reviewed With: patient        Progress: no change  Outcome Evaluation: Pt A&Ox4. Room air - 2 L NC while sleeping. DENISE, up standby asst. waiting for urine specimen. imaging and LP completed today. Pt diet started but to be NPO at midnight tonight. Denies n/t. C/o pain - prn pain medication given with some relief. R head/facial swelling. VAT placed RUE IV. SCD, VTE while in bed. Call light within reach. Safety maintained. BG monitored.

## 2023-04-01 PROBLEM — J81.0 ACUTE PULMONARY EDEMA: Status: ACTIVE | Noted: 2023-01-01

## 2023-04-01 PROBLEM — J96.01 ACUTE RESPIRATORY FAILURE WITH HYPOXIA: Status: ACTIVE | Noted: 2023-04-01

## 2023-04-01 NOTE — PLAN OF CARE
Goal Outcome Evaluation:  Plan of Care Reviewed With: patient     Pt transferred to ICU due to Afib RVR. Pt already on Cardizem at 15mg/hr on arrival. Transferring staff reports pt severely nauseated during transport. Pt arrived in respiratory distress with extreme air hunger and panic/agitation. Placed on heated high flow O2 40l/100%. Pt noted to be extremely diaphoretic with -140s with elevated blood pressure. RT present (from transferring floor) reports PO2 50.3 prior to transfer. Placed call for Dr. Hector  and she came to bedside for eval. Administered Digoxin. Stat cxr and labs obtained. Pt quickly improved with high flow o2 adminstration but remains in AFib RVR. Bp stable. Pt resting.   Problem: Adult Inpatient Plan of Care  Goal: Plan of Care Review  Outcome: Ongoing, Not Progressing  Flowsheets (Taken 4/1/2023 0800)  Plan of Care Reviewed With: patient     Problem: Adult Inpatient Plan of Care  Goal: Patient-Specific Goal (Individualized)  Outcome: Ongoing, Not Progressing     Problem: Adult Inpatient Plan of Care  Goal: Absence of Hospital-Acquired Illness or Injury  Outcome: Ongoing, Not Progressing     Problem: Adult Inpatient Plan of Care  Goal: Absence of Hospital-Acquired Illness or Injury  Intervention: Identify and Manage Fall Risk  Recent Flowsheet Documentation  Taken 4/1/2023 0600 by Zonia Eubanks RN  Safety Promotion/Fall Prevention:   fall prevention program maintained   safety round/check completed  Taken 4/1/2023 0500 by Zonia Eubanks RN  Safety Promotion/Fall Prevention:   fall prevention program maintained   safety round/check completed  Taken 4/1/2023 0410 by Zonia Eubanks, RN  Safety Promotion/Fall Prevention:   fall prevention program maintained   safety round/check completed     Problem: Adult Inpatient Plan of Care  Goal: Absence of Hospital-Acquired Illness or Injury  Intervention: Prevent Skin Injury  Recent Flowsheet Documentation  Taken 4/1/2023 0600 by Zonia Eubanks  SELENA BROWNING  Body Position: position changed independently  Taken 4/1/2023 0500 by Zonia Eubanks RN  Body Position: position changed independently  Taken 4/1/2023 0410 by Zoina Eubanks RN  Body Position: position changed independently     Problem: Adult Inpatient Plan of Care  Goal: Absence of Hospital-Acquired Illness or Injury  Intervention: Prevent and Manage VTE (Venous Thromboembolism) Risk  Recent Flowsheet Documentation  Taken 4/1/2023 0600 by Zonia Eubanks RN  Activity Management: bedrest  Taken 4/1/2023 0500 by Zonia Eubanks RN  Activity Management: bedrest  Taken 4/1/2023 0410 by Zonia Eubanks RN  Activity Management: bedrest     Problem: Adult Inpatient Plan of Care  Goal: Optimal Comfort and Wellbeing  Outcome: Ongoing, Not Progressing     Problem: Adult Inpatient Plan of Care  Goal: Optimal Comfort and Wellbeing  Intervention: Provide Person-Centered Care  Recent Flowsheet Documentation  Taken 4/1/2023 0500 by Zonia Eubanks RN  Trust Relationship/Rapport:   care explained   reassurance provided  Taken 4/1/2023 0410 by Zonia Eubanks RN  Trust Relationship/Rapport:   care explained   reassurance provided     Problem: Adult Inpatient Plan of Care  Goal: Readiness for Transition of Care  Outcome: Ongoing, Not Progressing     Problem: Diabetes Comorbidity  Goal: Blood Glucose Level Within Targeted Range  Outcome: Ongoing, Not Progressing     Problem: Hypertension Comorbidity  Goal: Blood Pressure in Desired Range  Outcome: Ongoing, Not Progressing     Problem: Fall Injury Risk  Goal: Absence of Fall and Fall-Related Injury  Outcome: Ongoing, Not Progressing  Intervention: Promote Injury-Free Environment  Recent Flowsheet Documentation  Taken 4/1/2023 0600 by Zonia Eubanks RN  Safety Promotion/Fall Prevention:   fall prevention program maintained   safety round/check completed  Taken 4/1/2023 0500 by Zonia Eubanks RN  Safety Promotion/Fall Prevention:   fall prevention program  maintained   safety round/check completed  Taken 4/1/2023 0410 by Zonia Eubanks, RN  Safety Promotion/Fall Prevention:   fall prevention program maintained   safety round/check completed

## 2023-04-01 NOTE — PLAN OF CARE
Goal Outcome Evaluation:  Plan of Care Reviewed With: patient        Progress: no change  Outcome Evaluation: OT evaluation completed.  Pt is A&Ox4.  He presents with bandaging around his head.  He is on Vapotherm 35L at 90%.  Pending his respiratory status, he will go for cranial flap removal 4/3/23.  Pt completed bed mobility with SBA/CGA.  He completed functional transfers and short ambulation trial to bedside chair (4-5 steps) with SBA/CGA.  He required max A for Lb dressing sitting EOB.  He demo no focal neurological deficits in regards to UE coordination, strength, or sensation.  He reports he has R peripheral vision impairment from removal of the tumor last year.  His O2 sats dropped into the high 80's x1-2 however it increased quickly with reast break and cues for pt to initiate PLB.  Pt is limited due to his impaired endurance, generalized weakness and fatigue, and pain.  OT will cont to follow to maximize his I and safety with ADLs and functional mobility.  Pt will likely be able to return home with assist following hospital stay however OT will cont to asses post-operatively.

## 2023-04-01 NOTE — PROGRESS NOTES
"Pharmacy Dosing Service  Pharmacokinetics  Vancomycin Initial Evaluation  Assessment/Action/Plan:  Loading dose: 1750 mg  Current Order: Vancomycin 1000 mg IVPB every 12 hours  Current end date:4/7/2023  Levels: No level ordered at this time  Additional antimicrobial agent(s): Zosyn    Vancomycin dosage initiated based on population pharmacokinetic parameters. Pharmacy will continue to follow daily and adjust dose accordingly.     Subjective:  Elijah Escobar is a 67 y.o. male with a Vancomycin \"Pharmacy to Dose\" consult for the treatment of intracranial infection , day 1 of 7 of treatment.    AUC Model Data:  Loading dose: 1750 mg at 11:30 04/01/2023.  Regimen: 1000 mg IV every 12 hours.  Start time: 18:00 on 04/01/2023  Exposure target: AUC24 (range)400-600 mg/L.hr   AUC24,ss: 519 mg/L.hr  PAUC*: 77 %  Ctrough,ss: 17.9 mg/L  Pconc*: 39 %  Tox.: 14 %    Objective:  Ht:  ; Wt:    Estimated Creatinine Clearance: 72.9 mL/min (by C-G formula based on SCr of 1.26 mg/dL).   Creatinine   Date Value Ref Range Status   04/01/2023 1.26 0.76 - 1.27 mg/dL Final   04/01/2023 1.20 0.76 - 1.27 mg/dL Final   03/31/2023 1.18 0.76 - 1.27 mg/dL Final   08/31/2022 0.80 0.60 - 1.30 mg/dL Final     Comment:     Serial Number: [id number]:  [id number]   10/21/2020 1.03 0.60 - 1.30 mg/dL Final   02/07/2020 1.00 0.60 - 1.30 mg/dL Final   02/07/2020 1.00 0.60 - 1.30 mg/dL Final      Lab Results   Component Value Date    WBC 19.05 (H) 04/01/2023    WBC 15.32 (H) 03/31/2023    WBC 9.58 01/25/2023      Baseline culture results:  Microbiology Results (last 10 days)       ** No results found for the last 240 hours. **            Mk Stahl, PharmGIOVANI  04/01/23 10:41 CDT    "

## 2023-04-01 NOTE — PLAN OF CARE
Goal Outcome Evaluation:           Progress: improving  Outcome Evaluation: No c/o pain. VSS; Cardizem gtt titrated off. A-fib rate controlled. Turns self. A&Ox4. Vapotherm 35L 90%.

## 2023-04-01 NOTE — PLAN OF CARE
Goal Outcome Evaluation:  Plan of Care Reviewed With: patient        Progress: no change  Outcome Evaluation: PT eval completed. Pt alert and oriented x4 with c/o SOA, weakness and fatigue. Pt on 35 L 90% vapotherm upon arrival. Sats reamined in high 90s through most of session, only dropping to 88% with EOB activity. Pt performs sup to sit with CGA and HOB elevated and demos no overt strength or sensation deficits in BLE. Pt performs sit to stand and bed to chair t/f with CGA, but reqd cues for O2 and IV tube management. Pt reports feeling much better getting OOB to recliner. Pt will benefit from skilled PT to improve endurance, fxl mob and independence. Pending his respiratory status, he will go for cranial flap removal 4/3/23. Recommend d/c home with assist pending resp status and surgery plans.

## 2023-04-01 NOTE — THERAPY EVALUATION
Patient Name: Elijah Escobar  : 1955    MRN: [id number]                              Today's Date: 2023       Admit Date: 3/31/2023    Visit Dx:     ICD-10-CM ICD-9-CM   1. Impaired mobility  Z74.09 799.89     Patient Active Problem List   Diagnosis   • Meningioma s/p craniotomy   • Hypertension   • GERD (gastroesophageal reflux disease)   • Coronary artery disease   • Class 2 severe obesity due to excess calories with serious comorbidity and body mass index (BMI) of 38.0 to 38.9 in adult   • Type 2 diabetes mellitus with hyperglycemia and peripheral neuropathy, with long-term current use of insulin (HCC)   • Leukocytosis   • Other specified anemias   • Paroxysmal atrial fibrillation with rapid ventricular response   • Post-operative infection   • S/P craniotomy   • Smoker   • History of cranioplasty   • Facial edema   • Facial swelling   • Acute pulmonary edema due to A-fib RVR   • Acute respiratory failure with hypoxia     Past Medical History:   Diagnosis Date   • Brain tumor    • Coronary artery disease    • COVID-19 vaccine series completed     MODERNA X 3; LAST DOSE 3/2022   • Diabetes    • Erectile dysfunction    • GERD (gastroesophageal reflux disease)    • Hypercholesteremia    • Hypertension    • Seizure      Past Surgical History:   Procedure Laterality Date   • BALLOON ANGIOPLASTY, ARTERY Right    • COLONOSCOPY     • CRANIECTOMY Right 2022    Procedure: CRANIECTOMY WITH INCISIONAL WASHOUT;  Surgeon: Felipe Banerjee MD;  Location: [facility] OR;  Service: Neurosurgery;  Laterality: Right;   • CRANIOPLASTY Right 10/4/2022    Procedure: CRANIOPLASTY right with Lumbar drain;  Surgeon: Flaco Portillo MD;  Location:  [facility] OR;  Service: Neurosurgery;  Laterality: Right;   • CRANIOTOMY FOR TUMOR Right 2022    Procedure: CRANIOTOMY FOR TUMOR STERIOTACTIC WITH BRAIN LAB, right;  Surgeon: Flaco Portillo MD;  Location:  [facility] OR;  Service: Neurosurgery;  Laterality: Right;       General Information     Row Name 04/01/23 1346          Physical Therapy Time and Intention    Document Type evaluation  Pt presents with R frontal, temporal, and periorbital edema, fatigue, chills, dypnea, nausea.  Dx: PNA, pseudomeningocele, Afib with RVR, acute respiratory failure with hypoxia.  Hx: crani for tumor 6/16/22, crani for washout 7/1/22, craniectomy 10/4/22  -SB     Mode of Treatment physical therapy  -SB     Row Name 04/01/23 1346          General Information    Patient Profile Reviewed yes  -SB     Prior Level of Function independent:;all household mobility;ADL's;home management;driving  walk in shower, [place]  -SB     Existing Precautions/Restrictions oxygen therapy device and L/min;fall  35L 90%  -SB     Barriers to Rehab medically complex  -SB     Row Name 04/01/23 1346          Living Environment    People in Home spouse  -SB     Row Name 04/01/23 1346          Home Main Entrance    Number of Stairs, Main Entrance three  -SB     Stair Railings, Main Entrance railing on left side (ascending)  -SB     Row Name 04/01/23 1346          Stairs Within Home, Primary    Number of Stairs, Within Home, Primary none  -SB     Row Name 04/01/23 1346          Cognition    Orientation Status (Cognition) oriented x 4  -SB     Row Name 04/01/23 1346          Safety Issues, Functional Mobility    Safety Issues Affecting Function (Mobility) safety precaution awareness  -SB     Impairments Affecting Function (Mobility) pain;shortness of breath;endurance/activity tolerance  -SB           User Key  (r) = Recorded By, (t) = Taken By, (c) = Cosigned By    Initials Name Provider Type    Erika Lynn, PT DPT Physical Therapist               Mobility     Row Name 04/01/23 1346          Bed Mobility    Bed Mobility supine-sit  -SB     Supine-Sit [place] (Bed Mobility) contact guard  -SB     Assistive Device (Bed Mobility) head of bed elevated  -SB     Row Name 04/01/23 1346          Transfers    Comment,  (Transfers) reqd cues for line management due to short O2 and IV tubing  -SB     Row Name 04/01/23 1346          Bed-Chair Transfer    Bed-Chair [place] (Transfers) verbal cues;contact guard  -SB     Row Name 04/01/23 1346          Sit-Stand Transfer    Sit-Stand [place] (Transfers) contact guard;verbal cues  -SB           User Key  (r) = Recorded By, (t) = Taken By, (c) = Cosigned By    Initials Name Provider Type    Erika Lynn PT DPT Physical Therapist               Obj/Interventions     Row Name 04/01/23 1346          Range of Motion Comprehensive    General Range of Motion bilateral lower extremity ROM WFL  -SB     Row Name 04/01/23 1346          Strength Comprehensive (MMT)    General Manual Muscle Testing (MMT) Assessment no strength deficits identified  -SB     Row Name 04/01/23 1346          Balance    Balance Assessment sitting static balance;sitting dynamic balance;standing dynamic balance;standing static balance  -SB     Static Sitting Balance standby assist  -SB     Dynamic Sitting Balance standby assist  -SB     Position, Sitting Balance sitting edge of bed;unsupported  -SB     Static Standing Balance standby assist  -SB     Dynamic Standing Balance contact guard  -SB     Position/Device Used, Standing Balance unsupported  -SB     Row Name 04/01/23 1346          Sensory Assessment (Somatosensory)    Sensory Assessment (Somatosensory) LE sensation intact  -SB           User Key  (r) = Recorded By, (t) = Taken By, (c) = Cosigned By    Initials Name Provider Type    Erika Lynn PT DPT Physical Therapist               Goals/Plan     Row Name 04/01/23 1346          Bed Mobility Goal 1 (PT)    Activity/Assistive Device (Bed Mobility Goal 1, PT) sit to supine;supine to sit;rolling to left;rolling to right  -SB     [place] Level/Cues Needed (Bed Mobility Goal 1, PT) independent  -SB     Time Frame (Bed Mobility Goal 1, PT) long term goal (LTG)  -SB     Progress/Outcomes (Bed  Mobility Goal 1, PT) new goal  -SB     Row Name 04/01/23 1346          Transfer Goal 1 (PT)    Activity/Assistive Device (Transfer Goal 1, PT) sit-to-stand/stand-to-sit;bed-to-chair/chair-to-bed  -SB     [place] Level/Cues Needed (Transfer Goal 1, PT) independent  -SB     Time Frame (Transfer Goal 1, PT) long term goal (LTG)  -SB     Progress/Outcome (Transfer Goal 1, PT) new goal  -SB     Row Name 04/01/23 1346          Gait Training Goal 1 (PT)    Activity/Assistive Device (Gait Training Goal 1, PT) gait (walking locomotion);increase endurance/gait distance;increase energy conservation;decrease fall risk;cane, straight  -SB     [place] Level (Gait Training Goal 1, PT) standby assist  -SB     Distance (Gait Training Goal 1, PT) 50 feet with O2 >/= 90%  -SB     Time Frame (Gait Training Goal 1, PT) long term goal (LTG)  -SB     Progress/Outcome (Gait Training Goal 1, PT) new goal  -SB     Row Name 04/01/23 1346          Therapy Assessment/Plan (PT)    Planned Therapy Interventions (PT) balance training;bed mobility training;gait training;patient/family education;transfer training;strengthening  -SB           User Key  (r) = Recorded By, (t) = Taken By, (c) = Cosigned By    Initials Name Provider Type    Erika Lynn, PT DPT Physical Therapist               Clinical Impression     Row Name 04/01/23 1346          Pain    Pretreatment Pain Rating 8/10  -SB     Posttreatment Pain Rating 8/10  -SB     Pain Location lower  -SB     Pain Location - back  -SB     Pre/Posttreatment Pain Comment c/o SOA, weakness and fatigue  -SB     Pain Intervention(s) Medication (See MAR);Repositioned;Ambulation/increased activity  -SB     Additional Documentation Pain Scale: Numbers Pre/Post-Treatment (Group)  -SB     Row Name 04/01/23 1346          Plan of Care Review    Plan of Care Reviewed With patient  -SB     Progress no change  -SB     Outcome Evaluation PT eval completed. Pt alert and oriented x4 with c/o SOA,  weakness and fatigue. Pt on 35 L 90% vapotherm upon arrival. Sats reamined in high 90s through most of session, only dropping to 88% with EOB activity. Pt performs sup to sit with CGA and HOB elevated and demos no overt strength or sensation deficits in BLE. Pt performs sit to stand and bed to chair t/f with CGA, but reqd cues for O2 and IV tube management. Pt reports feeling much better getting OOB to recliner. Pt will benefit from skilled PT to improve endurance, fxl mob and independence. Pending his respiratory status, he will go for cranial flap removal 4/3/23. Recommend d/c home with assist pending resp status and surgery plans.  -SB     Row Name 04/01/23 1344          Therapy Assessment/Plan (PT)    Patient/Family Therapy Goals Statement (PT) improve function  -SB     Rehab Potential (PT) good, to achieve stated therapy goals  -SB     Criteria for Skilled Interventions Met (PT) yes;meets criteria;skilled treatment is necessary  -SB     Therapy Frequency (PT) 2 times/day  -SB     Predicted Duration of Therapy Intervention (PT) until d/c or goals met  -SB     Row Name 04/01/23 1346          Vital Signs    Intratreatment Heart Rate (beats/min) 104  -SB     Posttreatment Heart Rate (beats/min) 94  -SB     Pre SpO2 (%) 95  -SB     O2 Delivery Pre Treatment other (see comments)  vapotherm 35 L 90%  -SB     Intra SpO2 (%) 88  -SB     O2 Delivery Intra Treatment other (see comments)  -SB     Post SpO2 (%) 100  -SB     O2 Delivery Post Treatment other (see comments)  -SB     Row Name 04/01/23 4844          Positioning and Restraints    Pre-Treatment Position in bed  -SB     Post Treatment Position chair  -SB     In Chair notified nsg;sitting;call light within reach;encouraged to call for assist  -SB           User Key  (r) = Recorded By, (t) = Taken By, (c) = Cosigned By    Initials Name Provider Type    Erika Lynn, PT DPT Physical Therapist               Outcome Measures     Row Name 04/01/23 1346 04/01/23 0878        How much help from another person do you currently need...    Turning from your back to your side while in flat bed without using bedrails? 4  -SB 4  -HT    Moving from lying on back to sitting on the side of a flat bed without bedrails? 4  -SB 4  -HT    Moving to and from a bed to a chair (including a wheelchair)? 3  -SB 4  -HT    Standing up from a chair using your arms (e.g., wheelchair, bedside chair)? 3  -SB 4  -HT    Climbing 3-5 steps with a railing? 3  -SB 3  -HT    To walk in hospital room? 3  -SB 4  -HT    AM-PAC 6 Clicks Score (PT) 20  -SB 23  -HT    Highest level of mobility 6 --> Walked 10 steps or more  -SB 7 --> Walked 25 feet or more  -HT    Row Name 04/01/23 1346 04/01/23 1329       Functional Assessment    Outcome Measure Options AM-PAC 6 Clicks Basic Mobility (PT)  -SB AM-PAC 6 Clicks Daily Activity (OT)  -CS          User Key  (r) = Recorded By, (t) = Taken By, (c) = Cosigned By    Initials Name Provider Type    CS Coretta Siddiqi S, OTR/L, CNT Occupational Therapist    HT Stacy Wang, RN Registered Nurse    Erika Lynn, PT DPT Physical Therapist                             Physical Therapy Education     Title: PT OT SLP Therapies (In Progress)     Topic: Physical Therapy (In Progress)     Point: Mobility training (Done)     Learning Progress Summary           Patient Acceptance, E, VU by SB at 4/1/2023 1405    Comment: pt edu on POC, benefits of act, PLB and d/c plans                   Point: Home exercise program (Not Started)     Learner Progress:  Not documented in this visit.          Point: Body mechanics (Not Started)     Learner Progress:  Not documented in this visit.          Point: Precautions (Done)     Learning Progress Summary           Patient Acceptance, E, VU by SB at 4/1/2023 1405    Comment: pt edu on POC, benefits of act, PLB and d/c plans                               User Key     Initials Effective Dates Name Provider Type Discipline    SB 06/16/21 -   Erika Javier, PT DPT Physical Therapist PT              PT Recommendation and Plan  Planned Therapy Interventions (PT): balance training, bed mobility training, gait training, patient/family education, transfer training, strengthening  Plan of Care Reviewed With: patient  Progress: no change  Outcome Evaluation: PT eval completed. Pt alert and oriented x4 with c/o SOA, weakness and fatigue. Pt on 35 L 90% vapotherm upon arrival. Sats reamined in high 90s through most of session, only dropping to 88% with EOB activity. Pt performs sup to sit with CGA and HOB elevated and demos no overt strength or sensation deficits in BLE. Pt performs sit to stand and bed to chair t/f with CGA, but reqd cues for O2 and IV tube management. Pt reports feeling much better getting OOB to recliner. Pt will benefit from skilled PT to improve endurance, fxl mob and independence. Pending his respiratory status, he will go for cranial flap removal 4/3/23. Recommend d/c home with assist pending resp status and surgery plans.     Time Calculation:    PT Charges     Row Name 04/01/23 1559             Time Calculation    Start Time 1346  10 min CR for total of 54 min  -SB      Stop Time 1430  -SB      Time Calculation (min) 44 min  -SB      PT Received On 04/01/23  -SB      PT Goal Re-Cert Due Date 04/11/23  -SB            User Key  (r) = Recorded By, (t) = Taken By, (c) = Cosigned By    Initials Name Provider Type    SB Erika Javier, PT DPT Physical Therapist              Therapy Charges for Today     Code Description Service Date Service Provider Modifiers Qty    54099420901 HC PT EVAL MOD COMPLEXITY 4 4/1/2023 Erika Javier PT DPT GP 1          PT G-Codes  Outcome Measure Options: AM-PAC 6 Clicks Basic Mobility (PT)  AM-PAC 6 Clicks Score (PT): 20  AM-PAC 6 Clicks Score (OT): 18  PT Discharge Summary  Anticipated Discharge Disposition (PT): home with assist    Erika Javier PT DPT  4/1/2023

## 2023-04-01 NOTE — PROGRESS NOTES
Pharmacy Dosing Service  Anticoagulant  Enoxaparin    Assessment/Action/Plan:  Pharmacy to dose Enoxaparin for the indication of atrial fibrillation. Initiated Enoxaparin 120 mg (1mg/kg) sq every 12h. Pharmacy will continue to monitor renal function and adjust dose accordingly.     Subjective:  Elijah Escobar is a 67 y.o. male on Enoxaparin 120 mg (1mg/kg) SQ every 12 hours for indication of atrial fibrillation.  Objective:  [Ht:  177.8 cm; Wt: 117 kg; BMI 37.16 kg/m3: There is no height or weight on file to calculate BMI.]  Estimated Creatinine Clearance: 72.9 mL/min (by C-G formula based on SCr of 1.26 mg/dL). No results found for: DDIMER   Lab Results   Component Value Date    INR 1.16 (H) 03/31/2023    INR 0.94 02/14/2023    INR 1.16 (H) 06/30/2022    PROTIME 14.9 (H) 03/31/2023    PROTIME 12.6 02/14/2023    PROTIME 14.3 06/30/2022      Lab Results   Component Value Date    HGB 13.3 04/01/2023    HGB 13.5 03/31/2023    HGB 13.5 01/25/2023      Lab Results   Component Value Date     04/01/2023     03/31/2023     02/14/2023       Mk Stahl, PharmD  04/01/23 10:47 CDT

## 2023-04-01 NOTE — THERAPY EVALUATION
Patient Name: Elijah Escobar  : 1955    MRN: [id number]                              Today's Date: 2023       Admit Date: 3/31/2023    Visit Dx: No diagnosis found.  Patient Active Problem List   Diagnosis   • Meningioma s/p craniotomy   • Hypertension   • GERD (gastroesophageal reflux disease)   • Coronary artery disease   • Class 2 severe obesity due to excess calories with serious comorbidity and body mass index (BMI) of 38.0 to 38.9 in adult   • Type 2 diabetes mellitus with hyperglycemia and peripheral neuropathy, with long-term current use of insulin (HCC)   • Leukocytosis   • Other specified anemias   • Paroxysmal atrial fibrillation with rapid ventricular response   • Post-operative infection   • S/P craniotomy   • Smoker   • History of cranioplasty   • Facial edema   • Facial swelling   • Acute pulmonary edema due to A-fib RVR   • Acute respiratory failure with hypoxia     Past Medical History:   Diagnosis Date   • Brain tumor    • Coronary artery disease    • COVID-19 vaccine series completed     MODERNA X 3; LAST DOSE 3/2022   • Diabetes    • Erectile dysfunction    • GERD (gastroesophageal reflux disease)    • Hypercholesteremia    • Hypertension    • Seizure      Past Surgical History:   Procedure Laterality Date   • BALLOON ANGIOPLASTY, ARTERY Right    • COLONOSCOPY     • CRANIECTOMY Right 2022    Procedure: CRANIECTOMY WITH INCISIONAL WASHOUT;  Surgeon: Felipe Banerjee MD;  Location:  [facility];  Service: Neurosurgery;  Laterality: Right;   • CRANIOPLASTY Right 10/4/2022    Procedure: CRANIOPLASTY right with Lumbar drain;  Surgeon: Flaco Portillo MD;  Location:  [facility];  Service: Neurosurgery;  Laterality: Right;   • CRANIOTOMY FOR TUMOR Right 2022    Procedure: CRANIOTOMY FOR TUMOR STERIOTACTIC WITH BRAIN LAB, right;  Surgeon: Flaco Portillo MD;  Location:  [facility];  Service: Neurosurgery;  Laterality: Right;      General Information     Row Name 23  1329          OT Time and Intention    Document Type evaluation  Pt presents with R frontal, temporal, and periorbital edema, fatigue, chills, dypnea, nausea.  Dx: PNA, pseudomeningocele, Afib with RVR, acute respiratory failure with hypoxia.  Hx: crani for tumor 6/16/22, crani for washout 7/1/22, craniectomy 10/4/22  -CS     Mode of Treatment occupational therapy;co-treatment  -CS     Row Name 04/01/23 1329          General Information    Patient Profile Reviewed yes  -CS     Prior Level of Function independent:;all household mobility;ADL's;home management;driving  -CS     Existing Precautions/Restrictions oxygen therapy device and L/min;fall  35L at 90%  -CS     Barriers to Rehab medically complex  -CS     Row Name 04/01/23 1329          Occupational Profile    Environmental Supports and Barriers (Occupational Profile) walk in shower, shower chair  -CS     Row Name 04/01/23 1329          Living Environment    People in Home spouse  -CS     Row Name 04/01/23 1329          Home Main Entrance    Number of Stairs, Main Entrance three  -CS     Stair Railings, Main Entrance railing on left side (ascending)  -CS     Row Name 04/01/23 1329          Stairs Within Home, Primary    Number of Stairs, Within Home, Primary none  -CS     Row Name 04/01/23 1329          Cognition    Orientation Status (Cognition) oriented x 4  -CS     Row Name 04/01/23 1329          Safety Issues, Functional Mobility    Impairments Affecting Function (Mobility) pain;shortness of breath;endurance/activity tolerance  -CS           User Key  (r) = Recorded By, (t) = Taken By, (c) = Cosigned By    Initials Name Provider Type    CS Coretta Siddiqi S, OTR/L, CNT Occupational Therapist                 Mobility/ADL's     Row Name 04/01/23 1329          Bed Mobility    Bed Mobility supine-sit  -CS     Supine-Sit [place] (Bed Mobility) contact guard  -CS     Assistive Device (Bed Mobility) head of bed elevated  -CS     Row Name 04/01/23 1329           Transfers    Transfers sit-stand transfer;stand-sit transfer  -     Row Name 04/01/23 1329          Sit-Stand Transfer    Sit-Stand [place] (Transfers) standby assist;contact guard  -     Row Name 04/01/23 1329          Stand-Sit Transfer    Stand-Sit [place] (Transfers) standby assist;contact guard  -[facility] Name 04/01/23 1329          Functional Mobility    Functional Mobility- Ind. Level contact guard assist  -     Functional Mobility- Comment Pt took 4-5 steps to bedside chair.  -     Row Name 04/01/23 1329          Activities of Daily Living    BADL Assessment/Intervention lower body dressing  -[facility] Name 04/01/23 1329          Lower Body Dressing Assessment/Training    [place] Level (Lower Body Dressing) don;socks;maximum assist (25% patient effort)  -     Position (Lower Body Dressing) edge of bed sitting  -           User Key  (r) = Recorded By, (t) = Taken By, (c) = Cosigned By    Initials Name Provider Type     Coretta Siddiqi, OTR/L, CNT Occupational Therapist               Obj/Interventions     [facility] Name 04/01/23 1329          Sensory Assessment (Somatosensory)    Sensory Assessment (Somatosensory) UE sensation intact  -[facility] Name 04/01/23 1329          Vision Assessment/Intervention    Visual Impairment/Limitations peripheral vision impaired right  baseline from tumor that was removed in June 2022  -[facility] Name 04/01/23 1329          Range of Motion Comprehensive    General Range of Motion bilateral upper extremity ROM WFL  -[facility] Name 04/01/23 1329          Strength Comprehensive (MMT)    Comment, General Manual Muscle Testing (MMT) Assessment BUE strength: 5/5  -[facility] Name 04/01/23 1329          Motor Skills    Motor Skills coordination  -     Coordination bilateral;upper extremity;WFL  -[facility] Name 04/01/23 1329          Balance    Balance Assessment sitting static balance;sitting dynamic balance;standing static balance;standing dynamic  balance  -CS     Static Sitting Balance supervision  -CS     Dynamic Sitting Balance supervision  -CS     Position, Sitting Balance sitting edge of bed  -CS     Static Standing Balance standby assist;contact guard  -CS     Dynamic Standing Balance contact guard  -CS           User Key  (r) = Recorded By, (t) = Taken By, (c) = Cosigned By    Initials Name Provider Type    CS Coretta Siddiqi S, OTR/L, CNT Occupational Therapist               Goals/Plan     Row Name 04/01/23 1329          Transfer Goal 1 (OT)    Activity/Assistive Device (Transfer Goal 1, OT) toilet;bed-to-chair/chair-to-bed  -CS     [place] Level/Cues Needed (Transfer Goal 1, OT) modified independence  -CS     Time Frame (Transfer Goal 1, OT) long term goal (LTG)  -CS     Progress/Outcome (Transfer Goal 1, OT) new goal  -CS     Row Name 04/01/23 1329          Bathing Goal 1 (OT)    Activity/Device (Bathing Goal 1, OT) bathing skills, all  -CS     [place] Level/Cues Needed (Bathing Goal 1, OT) supervision required  -CS     Time Frame (Bathing Goal 1, OT) long term goal (LTG)  -CS     Strategies/Barriers (Bathing Goal 1, OT) demonstrating energy conservation techniques with no vcs  -CS     Progress/Outcomes (Bathing Goal 1, OT) new goal  -CS     Row Name 04/01/23 1329          Dressing Goal 1 (OT)    Activity/Device (Dressing Goal 1, OT) lower body dressing  -CS     [place]/Cues Needed (Dressing Goal 1, OT) supervision required  -CS     Time Frame (Dressing Goal 1, OT) long term goal (LTG)  -CS     Progress/Outcome (Dressing Goal 1, OT) new goal  -CS     Row Name 04/01/23 1329          Therapy Assessment/Plan (OT)    Planned Therapy Interventions (OT) activity tolerance training;adaptive equipment training;BADL retraining;transfer/mobility retraining;strengthening exercise;occupation/activity based interventions;patient/caregiver education/training;functional balance retraining  -CS           User Key  (r) = Recorded By, (t) = Taken By,  (c) = Cosigned By    Initials Name Provider Type    CS Coretta Siddiqi S, OTR/L, CNT Occupational Therapist               Clinical Impression     Row Name 04/01/23 1329          Pain Assessment    Pretreatment Pain Rating 8/10  also complained of weakness and fatigue  -CS     Posttreatment Pain Rating 8/10  -CS     Pain Location lower  -CS     Pain Location - back  -CS     Pain Intervention(s) Medication (See MAR);Repositioned;Ambulation/increased activity  -CS     Row Name 04/01/23 0129          Plan of Care Review    Plan of Care Reviewed With patient  -CS     Progress no change  -CS     Outcome Evaluation OT evaluation completed.  Pt is A&Ox4.  He presents with bandaging around his head.  He is on Vapotherm 35L at 90%.  Pending his respiratory status, he will go for cranial flap removal 4/3/23.  Pt completed bed mobility with SBA/CGA.  He completed functional transfers and short ambulation trial to bedside chair (4-5 steps) with SBA/CGA.  He required max A for Lb dressing sitting EOB.  He demo no focal neurological deficits in regards to UE coordination, strength, or sensation.  He reports he has R peripheral vision impairment from removal of the tumor last year.  His O2 sats dropped into the high 80's x1-2 however it increased quickly with reast break and cues for pt to initiate PLB.  Pt is limited due to his impaired endurance, generalized weakness and fatigue, and pain.  OT will cont to follow to maximize his I and safety with ADLs and functional mobility.  Pt will likely be able to return home with assist following hospital stay however OT will cont to asses post-operatively.  -CS     Row Name 04/01/23 3458          Therapy Assessment/Plan (OT)    Patient/Family Therapy Goal Statement (OT) to go home  -CS     Rehab Potential (OT) good, to achieve stated therapy goals  -CS     Criteria for Skilled Therapeutic Interventions Met (OT) yes;skilled treatment is necessary  -CS     Therapy Frequency (OT) 3 times/wk   -CS     Predicted Duration of Therapy Intervention (OT) until hospital discharge  -CS     Row Name 04/01/23 1329          Therapy Plan Review/Discharge Plan (OT)    Anticipated Discharge Disposition (OT) home with assist  -CS     Row Name 04/01/23 1329          Vital Signs    Pre SpO2 (%) 95  -CS     O2 Delivery Pre Treatment other (see comments)  35 L 90% vapotherm  -CS     Intra SpO2 (%) 85  85%-97%  -CS     O2 Delivery Intra Treatment other (see comments)  35 L 90% vapotherm  -CS     Post SpO2 (%) 100  -CS     O2 Delivery Post Treatment other (see comments)  35 L 90% vapotherm  -CS     Pre Patient Position Supine  -CS     Intra Patient Position Sitting  -CS     Post Patient Position Sitting  -CS     Row Name 04/01/23 1329          Positioning and Restraints    Pre-Treatment Position in bed  -CS     Post Treatment Position chair  -CS     In Chair sitting;call light within reach;encouraged to call for assist;notified nsg  -CS           User Key  (r) = Recorded By, (t) = Taken By, (c) = Cosigned By    Initials Name Provider Type    CS Coretta Siddiqi, OTR/L, CNT Occupational Therapist               Outcome Measures     Row Name 04/01/23 1329          How much help from another is currently needed...    Putting on and taking off regular lower body clothing? 2  -CS     Bathing (including washing, rinsing, and drying) 2  -CS     Toileting (which includes using toilet bed pan or urinal) 3  -CS     Putting on and taking off regular upper body clothing 3  -CS     Taking care of personal grooming (such as brushing teeth) 4  -CS     Eating meals 4  -CS     AM-PAC 6 Clicks Score (OT) 18  -CS     Row Name 04/01/23 0850          How much help from another person do you currently need...    Turning from your back to your side while in flat bed without using bedrails? 4  -HT     Moving from lying on back to sitting on the side of a flat bed without bedrails? 4  -HT     Moving to and from a bed to a chair (including a  wheelchair)? 4  -HT     Standing up from a chair using your arms (e.g., wheelchair, bedside chair)? 4  -HT     Climbing 3-5 steps with a railing? 3  -HT     To walk in hospital room? 4  -HT     AM-PAC 6 Clicks Score (PT) 23  -HT     Highest level of mobility 7 --> Walked 25 feet or more  -HT     Row Name 04/01/23 1346 04/01/23 1329       Functional Assessment    Outcome Measure Options AM-PAC 6 Clicks Basic Mobility (PT)  -SB AM-PAC 6 Clicks Daily Activity (OT)  -CS          User Key  (r) = Recorded By, (t) = Taken By, (c) = Cosigned By    Initials Name Provider Type    CS Coretta Siddiqi, OTR/L, CNT Occupational Therapist    HT Stacy Wang, RN Registered Nurse    Erika Lynn, PT DPT Physical Therapist                Occupational Therapy Education     Title: PT OT SLP Therapies (In Progress)     Topic: Occupational Therapy (Done)     Point: ADL training (Done)     Description:   Instruct learner(s) on proper safety adaptation and remediation techniques during self care or transfers.   Instruct in proper use of assistive devices.              Learning Progress Summary           Patient Acceptance, E, VU,NR by CS at 4/1/2023 1445                   Point: Home exercise program (Done)     Description:   Instruct learner(s) on appropriate technique for monitoring, assisting and/or progressing therapeutic exercises/activities.              Learning Progress Summary           Patient Acceptance, E, VU,NR by CS at 4/1/2023 1445                   Point: Precautions (Done)     Description:   Instruct learner(s) on prescribed precautions during self-care and functional transfers.              Learning Progress Summary           Patient Acceptance, E, VU,NR by CS at 4/1/2023 1445                   Point: Body mechanics (Done)     Description:   Instruct learner(s) on proper positioning and spine alignment during self-care, functional mobility activities and/or exercises.              Learning Progress Summary            Patient Acceptance, E, VU,NR by  at 4/1/2023 1445                               User Key     Initials Effective Dates Name Provider Type Discipline     02/03/23 -  Coretta Siddiqi, OTR/L, CNT Occupational Therapist OT              OT Recommendation and Plan  Planned Therapy Interventions (OT): activity tolerance training, adaptive equipment training, BADL retraining, transfer/mobility retraining, strengthening exercise, occupation/activity based interventions, patient/caregiver education/training, functional balance retraining  Therapy Frequency (OT): 3 times/wk  Plan of Care Review  Plan of Care Reviewed With: patient  Progress: no change  Outcome Evaluation: OT evaluation completed.  Pt is A&Ox4.  He presents with bandaging around his head.  He is on Vapotherm 35L at 90%.  Pending his respiratory status, he will go for cranial flap removal 4/3/23.  Pt completed bed mobility with SBA/CGA.  He completed functional transfers and short ambulation trial to bedside chair (4-5 steps) with SBA/CGA.  He required max A for Lb dressing sitting EOB.  He demo no focal neurological deficits in regards to UE coordination, strength, or sensation.  He reports he has R peripheral vision impairment from removal of the tumor last year.  His O2 sats dropped into the high 80's x1-2 however it increased quickly with reast break and cues for pt to initiate PLB.  Pt is limited due to his impaired endurance, generalized weakness and fatigue, and pain.  OT will cont to follow to maximize his I and safety with ADLs and functional mobility.  Pt will likely be able to return home with assist following hospital stay however OT will cont to asses post-operatively.     Time Calculation:    Time Calculation- OT     Row Name 04/01/23 1444             Time Calculation- OT    OT Start Time 1329  -CS      OT Stop Time 1427  -CS      OT Time Calculation (min) 58 min  -CS      OT Received On 04/01/23  -      OT Goal Re-Cert Due Date 04/11/23   -CS         Untimed Charges    OT Eval/Re-eval Minutes 58  -CS         Total Minutes    Untimed Charges Total Minutes 58  -CS       Total Minutes 58  -CS            User Key  (r) = Recorded By, (t) = Taken By, (c) = Cosigned By    Initials Name Provider Type    CS Coretta Siddiqi OTR/L, ALEJANDRA Occupational Therapist              Therapy Charges for Today     Code Description Service Date Service Provider Modifiers Qty    [id number] HC OT EVAL MOD COMPLEXITY 4 4/1/2023 Coretta Siddiqi OTR/L, ALEJANRDA GO 1               PETER [place]/L, CNT  4/1/2023

## 2023-04-01 NOTE — PROGRESS NOTES
[facility] Medicine Services  INPATIENT PROGRESS NOTE    Patient Name: Elijah Escobar  Date of Admission: 3/31/2023  Today's Date: 04/01/23  Length of Stay: 1  Primary Care Physician: Brianna Martinez APRN    Subjective   Chief Complaint: Shortness of breath  HPI     The patient developed atrial fibrillation with RVR last evening prompting a consultation to the hospitalist service.  The patient was placed on a Cardizem drip.  Oxygen requirement has escalated to Vapotherm at 35 L and FiO2 0.80.  The patient is comfortable at the time of my evaluation.  He remains in atrial fibrillation with a rate of 100.  Chest x-ray is consistent with pulmonary edema likely secondary to atrial fibrillation.  The patient remains on amlodipine in addition to Cardizem drip.  Amlodipine will be discontinued.  The patient is also on IV fluids which will be discontinued.  Lovenox will be initiated for stroke prophylaxis given [id number] score of 5 with a 7.2% annual risk for CVA.  An echocardiogram will be obtained.  A previous echocardiogram was obtained last year for a similar episode of A-fib RVR.  Previous echo showed preserved LV function with 56 to 60% EF with no evidence of right heart failure.  CT angiogram was ordered however the patient is low risk for pulmonary embolus.  Currently there is no indication for CT angiogram given evidence of pulmonary edema on chest x-ray.  CTA previously ordered will be discontinued.  Given recurrence of atrial for with RVR, would recommend chronic anticoagulation with a DOAC at the time of discharge.    Review of Systems   All pertinent negatives and positives are as above. All other systems have been reviewed and are negative unless otherwise stated.     Objective    Temp:  [97 °F (36.1 °C)-98.5 °F (36.9 °C)] 97.6 °F (36.4 °C)  Heart Rate:  [] 101  Resp:  [0-34] 0  BP: ()/() 136/49  Physical Exam  Constitutional:       Appearance:  Normal appearance. He is obese. He is ill-appearing.      Interventions: Nasal cannula in place.      Comments: Vapotherm.   HENT:      Head: Atraumatic.      Comments: Right frontotemporal scalp swelling extending to the supraorbital region.     Right Ear: External ear normal.      Left Ear: External ear normal.      Nose: Nose normal.      Mouth/Throat:      Mouth: Mucous membranes are moist.      Pharynx: Oropharynx is clear.   Eyes:      General: No scleral icterus.     Conjunctiva/sclera: Conjunctivae normal.   Cardiovascular:      Rate and Rhythm: Tachycardia present. Rhythm irregularly irregular.   Neurological:      Mental Status: He is alert.           Results Review:  I have reviewed the labs, radiology results, and diagnostic studies.    Laboratory Data:   Results from last 7 days   Lab Units 04/01/23  0435 03/31/23  1020   WBC 10*3/mm3 19.05* 15.32*   HEMOGLOBIN g/dL 13.3 13.5   HEMATOCRIT % 42.2 40.8   PLATELETS 10*3/mm3 224 214        Results from last 7 days   Lab Units 04/01/23  0559 04/01/23  0435 03/31/23  1020   SODIUM mmol/L 136 137 138   POTASSIUM mmol/L 4.5 3.9 4.0   CHLORIDE mmol/L 97* 99 100   CO2 mmol/L 19.0* 16.0* 23.0   BUN mg/dL 35* 34* 22   CREATININE mg/dL 1.26 1.20 1.18   CALCIUM mg/dL 9.0 8.7 8.9   BILIRUBIN mg/dL  --  1.2 1.0   ALK PHOS U/L  --  111 103   ALT (SGPT) U/L  --  11 11   AST (SGOT) U/L  --  28 30   GLUCOSE mg/dL 281* 261* 243*       Culture Data:   No results found for: BLOODCX, URINECX, WOUNDCX, MRSACX, RESPCX, STOOLCX    Radiology Data:   Imaging Results (Last 24 Hours)       Procedure Component Value Units Date/Time    XR Chest 1 View [[id number]] Collected: 04/01/23 0636     Updated: 04/01/23 0641    Narrative:      EXAM/TECHNIQUE: XR CHEST 1 VW-     INDICATION: respiratory distress     COMPARISON: 03/31/2023     FINDINGS:     Cardiac silhouette is within normal limits and stable. No pleural  effusion or visible pneumothorax. Central vascular congestion with  diffuse  interstitial opacity, similar to prior exam. No acute osseous  finding.       Impression:         No change in appearance of the chest. Persistent central vascular  congestion with mild diffuse interstitial opacity, which may be related  to mild volume overload/pulmonary edema.  This report was finalized on 04/01/2023 06:38 by Dr. Mk Chinchilla MD.    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 Patient refuses additional imaging. No IV contrast  administered as patient unable to tolerate exam     COMPARISON: CT head 03/31/2023     FINDINGS: Postoperative changes from right frontal cranioplasty. There  is fluid attenuated signal seen superficial to the cranioplasty flap  within the scalp, increased from the postoperative 10/04/2022 CT head  exam, concerning for pseudomeningocele. There is questionable subdural  hemorrhage seen deep to the cranioplasty flap, however T2 gradient  sequences are not obtained. There is increased FLAIR signal within the  underlying right frontal lobe parenchyma. There is no midline shifting.  There is mild generalized volume loss. No intra-axial mass is  identified.       Impression:      1. Limited exam. Patient is able to tolerate only axial diffusion  weighted, FLAIR, T1 sequences with a single sagittal T1 sequence. No  additional imaging or contrast administered due to patient unable to  tolerate study.  2. Right frontal cranioplasty changes. Increasing fluid collection  superficial to the cranioplasty flap within the scalp concerning for  pseudomeningocele. Questionable small subdural hemorrhage deep to the  cranioplasty site. Mild hyperintense FLAIR signal noted within the  underlying right frontal lobe parenchyma.  This report was finalized on 03/31/2023 17:20 by   Coleen Terry MD.    CT Head Without Contrast [[id number]] Collected: 03/31/23 1540     Updated: 03/31/23 1547    Narrative:      EXAMINATION: CT HEAD WO CONTRAST-      3/31/2023 3:16 PM CDT     HISTORY: Pseudomeningocele concern for postcraniotomy infection     In order to have a CT radiation dose as low as reasonably achievable  Automated Exposure Control was utilized for adjustment of the mA and/or  KV according to patient size.     DLP in mGycm= 413.     Right frontal craniectomy.     Large right frontal scalp fluid collection measuring approximately 8 cm  in diameter and 1.8 cm in thickness.  There is a 5 mm rim of low density fluid deep to the synthetic catheter  placed at the right frontal craniectomy site.  Deep to this rim of fluid is a 7 mm thick rim of acute hemorrhage.     There is no significant mass effect.  Mild right frontal white matter edema.     Normal ventricle size.  No parenchymal hemorrhage or acute infarct.  No midline shift.     Summary:  1. Postsurgical changes with right frontal scalp fluid collection and a  small amount of right frontal ventricular cranial hemorrhage. No midline  shift or large intracranial hematoma.                                   This report was finalized on 03/31/2023 15:44 by Dr. Ghulam Cano MD.    IR LUMBAR PUNCTURE DIAGNOSTIC [[id number]] Collected: 03/31/23 1528     Updated: 03/31/23 1535    Narrative:      EXAMINATION: IR LUMBAR PUNCTURE DIAGNOSTIC-     3/31/2023 2:33 PM CDT     HISTORY: Pseudomeningocele concern for postoperative intracranial  infection     The procedure was explained to the patient. The benefits, and the risk  and complications were discussed. The patient understood the procedure  and signed the consent.     The patient was positioned on the fluoroscopy table in prone position  and lumbar spine was examined. A suitable spot opposite space L1-2 was  selected for puncture. The spot was marked with an ink marker.     After sterile preparation  and application of local anesthesia a size  20-gauge spinal needle was introduced into the thecal sac and a free  flow of clear colorless thin CSF was established. The outer hub of the  spinal needle was attached to a pressure measuring device and opening  pressure was measured. The opening pressure is 23 cm water. Subsequently  27 mL of CSF was removed and and displaced and 4 separate test tubes  which were sent to the laboratory for further evaluation as instructed  by the attending physician. The needle was then withdrawn after  replacing the stylus. The puncture site was covered with a sterile  Band-Aid.     Patient tolerated the procedure well. No immediate complications were  noted.     The postprocedure care instructions were given to the patient.       Impression:      1. A successful lumbar puncture and opening and closing pressure  measurements.  2. Fluoroscopy time: 1 minute 14 seconds.  3. The dose: 101mGy.  4. Number of images: 1  This report was finalized on 03/31/2023 15:32 by Dr. Michelle Vigil MD.    XR Chest PA & Lateral [[id number]] Collected: 03/31/23 1452     Updated: 03/31/23 1458    Narrative:      HISTORY: Dyspnea     CXR: 2 views the chest are obtained.     COMPARISON: 09/26/2022     FINDINGS: There is an increasing prominence of the interstitial markings  diffusely. Anterior upper lobe consolidation on the lateral view is not  identified of the frontal exam and most likely secondary to soft tissue  artifact/attenuation. The heart is upper limits of normal in size. No  pleural effusion or pneumothorax identified. No acute regional bony  pathology. Chronic calcifications adjacent to the bilateral shoulders.       Impression:      1. Increasing interstitial densities may relate to mild edema/volume  overload. Pneumonitis considered. Soft tissue attenuation/artifact  suspected on the lateral view.  This report was finalized on 03/31/2023 14:55 by Dr. Coleen Terry MD.            I have  reviewed the patient's current medications.     Assessment/Plan   Assessment  Active Hospital Problems    Diagnosis     **Facial swelling     Acute pulmonary edema due to A-fib RVR     Acute respiratory failure with hypoxia     Paroxysmal atrial fibrillation with rapid ventricular response     Type 2 diabetes mellitus with hyperglycemia and peripheral neuropathy, with long-term current use of insulin (HCC)     Coronary artery disease     Meningioma s/p craniotomy        Treatment Plan  Discontinue IV fluids  Lasix 40 mg IV every 12 hours  Echocardiogram  Discontinue amlodipine  Lovenox full anticoagulation dosed per pharmacy  Discontinue CT angiogram  Continue Cardizem drip for rate control  Attempt to de-escalate oxygen  Add vancomycin to antibiotic regimen with pharmacy to dose    Medical Decision Making  Number and Complexity of problems:   1) respiratory failure with hypoxia secondary to pulmonary edema which is secondary to A-fib RVR, acute, moderate complexity  2) PAF with RVR, acute, moderate complexity  3) meningioma versus intracranial infection, acute, high complexity  4) type 2 diabetes, chronic, moderate complexity    Differential Diagnosis: Pulmonary embolus, LV dysfunction    Conditions and Status        Condition is unchanged.     [facility] Data  External documents reviewed: Care everywhere documentation  Cardiac tracing (EKG, telemetry) interpretation: See HPI  Radiology interpretation: See HPI  Labs reviewed: See HPI  Any tests that were considered but not ordered: None     Decision rules/scores evaluated (example [id number], [place], etc): [id number] score of 5 with a 7.2% risk of stroke annually     Discussed with: The patient and nursing     Care Planning  Shared decision making: The patient  Code status and discussions: Full code    Disposition  Social Determinants of Health that impact treatment or disposition: None  I expect the patient to be discharged by the primary service.     Electronically  signed by Ramin Singh DO, 04/01/23, 10:25 CDT.

## 2023-04-01 NOTE — CONSULTS
[facility] Medicine Services  consult    Date of Admission: 3/31/2023  Primary Care Physician: Brianna Martinez APRN    Subjective   Primary Historian: Patient    Chief Complaint: Consult from admitting for A Fib    History of Present Illness  Very pleasant 67-year-old male who has an extensive history with brain tumor and craniotomy.  The patient was found to be in A-fib RVR, and hospital medicine was consulted.  On my exam, the patient is curled into the fetal position on his right side.  He states he does not feel well.  When I asked him what is bothering him, he tells me that his head is absolutely killing him.  He also feels dehydrated, and states that he has not had anything to eat or drink all day.  Cardiac echo noted below.  The patient states that he does feel slightly short of breath.  He is on a continuous pulse ox monitor and I noted him to be between 95 and 96.  He states he had some intermittent chest pain, possibly due to his rate.  Per his nurse, as he has had some dark urine with decreased output.  He has no lower extremity edema.  The patient is able to give his own history without any difficulty.    On chart review, the patient sotalol is currently on hold.    6/20/22 cardiac echo:  Interpretation Summary    • Left ventricular ejection fraction appears to be 56 - 60%. Left ventricular systolic function is normal.  • Left ventricular wall thickness is consistent with mild concentric hypertrophy.  • Normal right ventricular cavity size and systolic function noted.  • There is no significant (greater than mild) valvular dysfunction.      Review of Systems   Otherwise complete ROS reviewed and negative except as mentioned in the HPI.    Past Medical History:   Past Medical History:   Diagnosis Date   • Brain tumor (HCC)    • Coronary artery disease    • COVID-19 vaccine series completed     MODERNA X 3; LAST DOSE 3/2022   • Diabetes (HCC)    • Erectile dysfunction     • GERD (gastroesophageal reflux disease)    • Hypercholesteremia    • Hypertension    • Seizure (HCC)      Past Surgical History:  Past Surgical History:   Procedure Laterality Date   • BALLOON ANGIOPLASTY, ARTERY Right    • COLONOSCOPY     • CRANIECTOMY Right 7/1/2022    Procedure: CRANIECTOMY WITH INCISIONAL WASHOUT;  Surgeon: Felipe Banerjee MD;  Location: [facility] OR;  Service: Neurosurgery;  Laterality: Right;   • CRANIOPLASTY Right 10/4/2022    Procedure: CRANIOPLASTY right with Lumbar drain;  Surgeon: Flaco Portillo MD;  Location:  [facility] OR;  Service: Neurosurgery;  Laterality: Right;   • CRANIOTOMY FOR TUMOR Right 6/16/2022    Procedure: CRANIOTOMY FOR TUMOR STERIOTACTIC WITH BRAIN LAB, right;  Surgeon: Flaco Portillo MD;  Location:  [facility] OR;  Service: Neurosurgery;  Laterality: Right;     Social History:  reports that he has quit smoking. His smoking use included cigarettes. He smoked an average of .25 packs per day. He does not have any smokeless tobacco history on file. He reports that he does not drink alcohol and does not use drugs.    Family History: family history includes Cancer in his mother; Heart disease in his father.       Allergies:  No Known Allergies    Medications:  Prior to Admission medications    Medication Sig Start Date End Date Taking? Authorizing Provider   amLODIPine (NORVASC) 10 MG tablet Take 1 tablet by mouth Daily. 7/12/22  Yes Rahul Arnold APRN   aspirin 81 MG EC tablet Take 1 tablet by mouth Daily.   Yes Helene Palmer MD   atorvastatin (LIPITOR) 20 MG tablet Take 1 tablet by mouth Every Night. 2/8/22  Yes Helene Palmer MD   gabapentin (NEURONTIN) 300 MG capsule Take 1 capsule by mouth 3 (Three) Times a Day. 3/7/22  Yes Helene Palmer MD   insulin detemir (LEVEMIR) 100 UNIT/ML injection Inject 20-35 Units under the skin into the appropriate area as directed 2 (Two) Times a Day. 20u am & 35u hs   Yes Helene Palmer MD    metoprolol tartrate (LOPRESSOR) 50 MG tablet Take 1 tablet by mouth 2 (Two) Times a Day With Meals. 3/9/23  Yes Helene Palmer MD   omeprazole (priLOSEC) 20 MG capsule Take 1 capsule by mouth Daily.   Yes Helene Palmer MD   sotalol (BETAPACE) 120 MG tablet Take 1 tablet by mouth 2 (Two) Times a Day. 3/29/23  Yes Helene Palmer MD   acetaminophen (TYLENOL) 325 MG tablet Take 2 tablets by mouth Every 4 (Four) Hours As Needed for Mild Pain . 7/11/22   Rahul Arnold APRN   HYDROcodone-acetaminophen (NORCO)  MG per tablet Take 1 tablet by mouth 4 (Four) Times a Day As Needed for Moderate Pain. 3/6/23   Helene Palmer MD   levETIRAcetam (KEPPRA) 500 MG tablet Take 1 tablet by mouth 2 (Two) Times a Day.    ProviderHelene MD     I have utilized all available immediate resources to obtain, update, or review the patient's current medications (including all prescriptions, over-the-counter products, herbals, cannabis/cannabidiol products, and vitamin/mineral/dietary (nutritional) supplements).    Objective     Vital Signs: BP (!) 126/103 (BP Location: Left arm, Patient Position: Lying) Comment: nurse notified  Pulse (!) 151 Comment: nurse notified  Temp 98.2 °F (36.8 °C) (Oral)   Resp 18   SpO2 95%   Physical Exam  Vitals reviewed.   Constitutional:       Appearance: He is ill-appearing.   HENT:      Head: Normocephalic and atraumatic.      Right Ear: External ear normal.      Left Ear: External ear normal.      Nose: Nose normal.   Eyes:      General: No scleral icterus.     Conjunctiva/sclera: Conjunctivae normal.   Cardiovascular:      Rate and Rhythm: Tachycardia present. Rhythm irregular.      Heart sounds: Normal heart sounds.   Pulmonary:      Effort: Pulmonary effort is normal.      Breath sounds: Normal breath sounds.   Abdominal:      General: There is no distension.      Palpations: Abdomen is soft.      Tenderness: There is no abdominal tenderness.   Musculoskeletal:          General: No swelling or tenderness.      Cervical back: Normal range of motion and neck supple.   Skin:     General: Skin is warm and dry.   Neurological:      Mental Status: He is alert.      Cranial Nerves: No cranial nerve deficit.   Psychiatric:         Mood and Affect: Mood normal.         Behavior: Behavior normal.      Comments: Appears that he does not feel well       Results Reviewed:  Lab Results (last 24 hours)     Procedure Component Value Units Date/Time    Urinalysis With Culture If Indicated - Urine, Clean Catch [[id number]]  (Abnormal) Collected: 03/31/23 2346    Specimen: Urine, Clean Catch Updated: 04/01/23 0042     Color, UA Orange     Appearance, UA Cloudy     pH, UA <=5.0     Specific Gravity, UA >1.030     Glucose,  mg/dL (Trace)     Ketones, UA Negative     Bilirubin, UA Small (1+)     Blood, UA Negative     Protein,  mg/dL (2+)     Leuk Esterase, UA Small (1+)     Nitrite, UA Positive     Urobilinogen, UA 2.0 E.U./dL    Narrative:      Dipstick results may be inaccurate due to color interference.    Urinalysis, Microscopic Only - Urine, Clean Catch [[id number]]  (Abnormal) Collected: 03/31/23 2346    Specimen: Urine, Clean Catch Updated: 04/01/23 0042     RBC, UA 3-5 /HPF      WBC, UA Too Numerous to Count /HPF      Bacteria, UA 1+ /HPF      Squamous Epithelial Cells, UA 7-12 /HPF      Yeast, UA Small/1+ Yeast /HPF      Hyaline Casts, UA 7-12 /LPF      Granular Casts, UA 3-6 /LPF      Methodology Manual Light Microscopy    Urine Culture - Urine, Urine, Clean Catch [[id number]] Collected: 03/31/23 2346    Specimen: Urine, Clean Catch Updated: 04/01/23 0042    POC Glucose Once [[id number]]  (Abnormal) Collected: 03/31/23 1703    Specimen: Blood Updated: 03/31/23 1718     Glucose 206 mg/dL      Comment: : [id number] Richard Reinoso ID: [id number]       Cell Count With Differential, CSF Use CSF Tube: 1 [[id number]]  (Abnormal) Collected: 03/31/23 1450    Specimen: Cerebrospinal  Fluid from Lumbar Puncture Updated: 03/31/23 1556    Narrative:      The following orders were created for panel order Cell Count With Differential, CSF Use CSF Tube: 1.  Procedure                               Abnormality         Status                     ---------                               -----------         ------                     Cell Count, CSF - Cerebr...[[id number]]  Abnormal            Final result                 Please view results for these tests on the individual orders.    Cell Count, CSF - Cerebrospinal Fluid, Lumbar Puncture [[id number]]  (Abnormal) Collected: 03/31/23 1450    Specimen: Cerebrospinal Fluid from Lumbar Puncture Updated: 03/31/23 1556     Color, CSF Colorless     Supernatant Color, CSF Colorless     Appearance, CSF Clear     RBC, CSF 18 /mm3      Nucleated Cells, CSF 3 /mm3      Volume, CSF 26.0 mL      Tube Number, CSF 1     Method: Hemacytometer Method    Narrative:      Differential not indicated.    Protein, CSF - Cerebrospinal Fluid, Lumbar Puncture [[id number]]  (Abnormal) Collected: 03/31/23 1450    Specimen: Cerebrospinal Fluid from Lumbar Puncture Updated: 03/31/23 1554     Protein, Total (CSF) 90.9 mg/dL     Glucose, CSF - Cerebrospinal Fluid, Lumbar Puncture [[id number]]  (Abnormal) Collected: 03/31/23 1450    Specimen: Cerebrospinal Fluid from Lumbar Puncture Updated: 03/31/23 1554     Glucose,  mg/dL     AFB Culture - Cerebrospinal Fluid, Lumbar Puncture [[id number]] Collected: 03/31/23 1450    Specimen: Cerebrospinal Fluid from Lumbar Puncture Updated: 03/31/23 1521    Culture, CSF - Cerebrospinal Fluid, Lumbar Puncture [[id number]] Collected: 03/31/23 1450    Specimen: Cerebrospinal Fluid from Lumbar Puncture Updated: 03/31/23 1521    Sedimentation Rate [[id number]]  (Abnormal) Collected: 03/31/23 1248    Specimen: Blood Updated: 03/31/23 1335     Sed Rate 60 mm/hr     Protime-INR [[id number]]  (Abnormal) Collected: 03/31/23 1249    Specimen: Blood Updated:  03/31/23 1332     Protime 14.9 Seconds      INR 1.16    aPTT [[id number]]  (Normal) Collected: 03/31/23 1249    Specimen: Blood Updated: 03/31/23 1332     PTT 34.3 seconds     Blood Culture - Blood, Arm, Right [[id number]] Collected: 03/31/23 1251    Specimen: Blood from Arm, Right Updated: 03/31/23 1323    Blood Culture - Blood, Arm, Left [[id number]] Collected: 03/31/23 1249    Specimen: Blood from Arm, Left Updated: 03/31/23 1323    POC Glucose Once [[id number]]  (Abnormal) Collected: 03/31/23 1257    Specimen: Blood Updated: 03/31/23 1309     Glucose 272 mg/dL      Comment: : [id number] Fatimah LennonleyMeter ID: [id number]           Imaging Results (Last 24 Hours)     Procedure Component Value Units Date/Time    MRI Brain Without Contrast [[id number]] Collected: 03/31/23 1708     Updated: 03/31/23 1723    Narrative:      HISTORY: Pseudomeningocele, 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 Patient refuses additional imaging. No IV contrast  administered as patient unable to tolerate exam     COMPARISON: CT head 03/31/2023     FINDINGS: Postoperative changes from right frontal cranioplasty. There  is fluid attenuated signal seen superficial to the cranioplasty flap  within the scalp, increased from the postoperative 10/04/2022 CT head  exam, concerning for pseudomeningocele. There is questionable subdural  hemorrhage seen deep to the cranioplasty flap, however T2 gradient  sequences are not obtained. There is increased FLAIR signal within the  underlying right frontal lobe parenchyma. There is no midline shifting.  There is mild generalized volume loss. No intra-axial mass is  identified.       Impression:      1. Limited exam. Patient is able to tolerate only axial diffusion  weighted, FLAIR, T1 sequences with a single sagittal T1 sequence. No  additional imaging or contrast administered due to patient unable to  tolerate study.  2.  Right frontal cranioplasty changes. Increasing fluid collection  superficial to the cranioplasty flap within the scalp concerning for  pseudomeningocele. Questionable small subdural hemorrhage deep to the  cranioplasty site. Mild hyperintense FLAIR signal noted within the  underlying right frontal lobe parenchyma.  This report was finalized on 03/31/2023 17:20 by Dr. Coleen Terry MD.    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     DLP in mGycm= 413.     Right frontal craniectomy.     Large right frontal scalp fluid collection measuring approximately 8 cm  in diameter and 1.8 cm in thickness.  There is a 5 mm rim of low density fluid deep to the synthetic catheter  placed at the right frontal craniectomy site.  Deep to this rim of fluid is a 7 mm thick rim of acute hemorrhage.     There is no significant mass effect.  Mild right frontal white matter edema.     Normal ventricle size.  No parenchymal hemorrhage or acute infarct.  No midline shift.     Summary:  1. Postsurgical changes with right frontal scalp fluid collection and a  small amount of right frontal ventricular cranial hemorrhage. No midline  shift or large intracranial hematoma.                                   This report was finalized on 03/31/2023 15:44 by Dr. Ghulam Cano MD.    IR LUMBAR PUNCTURE DIAGNOSTIC [[id number]] Collected: 03/31/23 1528     Updated: 03/31/23 1535    Narrative:      EXAMINATION: IR LUMBAR PUNCTURE DIAGNOSTIC-     3/31/2023 2:33 PM CDT     HISTORY: Pseudomeningocele concern for postoperative intracranial  infection     The procedure was explained to the  patient. The benefits, and the risk  and complications were discussed. The patient understood the procedure  and signed the consent.     The patient was positioned on the fluoroscopy table in prone position  and lumbar spine was examined. A suitable spot opposite space L1-2 was  selected for puncture. The spot was marked with an ink marker.     After sterile preparation and application of local anesthesia a size  20-gauge spinal needle was introduced into the thecal sac and a free  flow of clear colorless thin CSF was established. The outer hub of the  spinal needle was attached to a pressure measuring device and opening  pressure was measured. The opening pressure is 23 cm water. Subsequently  27 mL of CSF was removed and and displaced and 4 separate test tubes  which were sent to the laboratory for further evaluation as instructed  by the attending physician. The needle was then withdrawn after  replacing the stylus. The puncture site was covered with a sterile  Band-Aid.     Patient tolerated the procedure well. No immediate complications were  noted.     The postprocedure care instructions were given to the patient.       Impression:      1. A successful lumbar puncture and opening and closing pressure  measurements.  2. Fluoroscopy time: 1 minute 14 seconds.  3. The dose: 101mGy.  4. Number of images: 1  This report was finalized on 03/31/2023 15:32 by Dr. Michelle Vigil MD.    XR Chest PA & Lateral [[id number]] Collected: 03/31/23 1452     Updated: 03/31/23 1458    Narrative:      HISTORY: Dyspnea     CXR: 2 views the chest are obtained.     COMPARISON: 09/26/2022     FINDINGS: There is an increasing prominence of the interstitial markings  diffusely. Anterior upper lobe consolidation on the lateral view is not  identified of the frontal exam and most likely secondary to soft tissue  artifact/attenuation. The heart is upper limits of normal in size. No  pleural effusion or pneumothorax identified. No acute  regional bony  pathology. Chronic calcifications adjacent to the bilateral shoulders.       Impression:      1. Increasing interstitial densities may relate to mild edema/volume  overload. Pneumonitis considered. Soft tissue attenuation/artifact  suspected on the lateral view.  This report was finalized on 03/31/2023 14:55 by Dr. Coleen Terry MD.        I have personally reviewed and interpreted the radiology studies and ECG obtained at time of admission.     Assessment / Plan   Assessment:   Active Hospital Problems    Diagnosis    • **Facial swelling      Impression:  1.  A-fib with RVR   2.  Insulin-dependent diabetes  3.  Headache pain  4.  Abnormal urinalysis    Treatment Plan  1.  We will add gentle IV hydration  2.  Add Cardizem drip  3.  Current long-acting insulin is on hold secondary to patient's current n.p.o. status.  4.  Pain control  5.  Question starting antibiotics.  Will defer to attending.  Question need for repeat urinalysis or addition of Rocephin secondary to UTI.  6.  Follow-up labs in the morning  7.  May consider restarting sotalol, for rate control    About 45 minutes after evaluation, patient declined and 02 saturation declined. Patient was moved to the ICU.   Rate still tachycardic. Patient placed on VapoTherm 100%. 02 pulse OX improved to above 90 %.   Concern for infection VS PE.   Previous CXR Pneumonitis VS Edema.     Will add Zosyn, WBC worsening. Get CTA. Cardiology consult as Troponin was elevated and rate not controlled. Repeat troponin in 2 hours.     The patient will be consulted to my service here at [facility].  Primary team to take over in the morning    Medical Decision Making  Number and Complexity of problems: 4, high  Differential Diagnosis: UTI    Conditions and Status        Condition is worsening.     [facility] Data  External documents reviewed: None  Cardiac tracing (EKG, telemetry) interpretation: None  Radiology interpretation: None  Labs reviewed:  Reviewed  Any tests that were considered but not ordered: None     Decision rules/scores evaluated (example [id number], Wells, etc): None     Discussed with: Patient and nursing     Care Planning  Shared decision making: Patient and nursing  Code status and discussions: Full    Disposition  Social Determinants of Health that impact treatment or disposition: None  Estimated length of stay is per attending.     I confirmed that the patient's advanced care plan is present, code status is documented, and a surrogate decision maker is listed in the patient's medical record.     The patient's surrogate decision maker is family.     The patient was seen and examined by me on 4/1/2023 at seen after midnight.    Electronically signed by Efra Hector DO, 04/01/23, 02:37 CDT.

## 2023-04-01 NOTE — PROGRESS NOTES
Neurosurgery Daily Progress Note    HPI:  Elijah Escobar is a 67 y.o. male with a significant medical history of hypertension, diabetes, hyperlipidemia, seizures, craniotomy for tumor (6/16/2022), craniotomy for washout (7/1/2022), craniectomy (10/4/2022), coronary artery disease, diabetes, erectile dysfunction, GERD, hypertension, hyperlipidemia, tobacco abuse, and obesity.  He presents today with a complaint of a 4-5 days onset of progressively worsening right frontal, temporal, and periorbital edema and associated symptoms to include, but not limited to generalized fatigue, chills, dyspnea, intermittent nausea without vomiting, and states he's felt feverish.  Physical exam findings of neurologically intact with right frontal, temporal, and periorbital swelling.  WBC elevated at 15.  3 to an CRP elevated at 14.75.  Imaging pending.    Assessment:   Past Medical History:   Diagnosis Date   • Brain tumor    • Coronary artery disease    • COVID-19 vaccine series completed     MODERNA X 3; LAST DOSE 3/2022   • Diabetes    • Erectile dysfunction    • GERD (gastroesophageal reflux disease)    • Hypercholesteremia    • Hypertension    • Seizure      Active Hospital Problems    Diagnosis    • **Facial swelling        Plan:   Neuro: Stable, intact  Infected cranial flap   Plan for OR early next week         CV: AFib with RVR and heart strain   Stabilized overnight   Cards today  Pulm: Possible PNA with desats overnight   Vapotherm   Abx per hospitalist  :  No patel  FEN: Reg diet for now  Endocrine: SSI  GI: SHERIF  ID: Elevated WBC, CRP and ESR   PNA and possibly infected cranial flap   UTI with cultures pending.    Cont Zosyn  Heme:  DVT prophylaxis  Pain: well controlled  Dispo: pending removal of cranial flap      Chief complaint:   SHERIF    HPI  Subjective  Doing well    Temp:  [97 °F (36.1 °C)-98.5 °F (36.9 °C)] 97 °F (36.1 °C)  Heart Rate:  [] 113  Resp:  [0-34] 0  BP: ()/() 121/84    Output by Drain  (mL) 03/31/23 0701 - 03/31/23 1900 03/31/23 1901 - 04/01/23 0700 04/01/23 0701 - 04/01/23 0824 Range Total   Patient has no LDAs of requested type attached.       Objective    Neurologic Exam     Mental Status   Oriented to person, place, and time.   Speech: speech is normal   Level of consciousness: alert    Cranial Nerves     CN III, IV, VI   Pupils are equal, round, and reactive to light.  Extraocular motions are normal.     CN V   Facial sensation intact.     CN VII   Facial expression full, symmetric.     Motor Exam   Right arm pronator drift: absent  Left arm pronator drift: absent    Strength   Right deltoid: 5/5  Left deltoid: 5/5  Right biceps: 5/5  Left biceps: 5/5  Right triceps: 5/5  Left triceps: 5/5  Right iliopsoas: 5/5  Left iliopsoas: 5/5  Right quadriceps: 5/5  Left quadriceps: 5/5  Right gastroc: 5/5  Left gastroc: 5/5    Sensory Exam   Light touch normal.       Drains: * No LDAs found *    Imaging Results (Last 24 Hours)     Procedure Component Value Units Date/Time    XR Chest 1 View [[id number]] Collected: 04/01/23 0636     Updated: 04/01/23 0641    Narrative:      EXAM/TECHNIQUE: XR CHEST 1 VW-     INDICATION: respiratory distress     COMPARISON: 03/31/2023     FINDINGS:     Cardiac silhouette is within normal limits and stable. No pleural  effusion or visible pneumothorax. Central vascular congestion with  diffuse interstitial opacity, similar to prior exam. No acute osseous  finding.       Impression:         No change in appearance of the chest. Persistent central vascular  congestion with mild diffuse interstitial opacity, which may be related  to mild volume overload/pulmonary edema.  This report was finalized on 04/01/2023 06:38 by Dr. Mk Chinchilla MD.    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 Patient refuses additional imaging. No IV contrast  administered as patient unable to tolerate exam     COMPARISON: CT head 03/31/2023     FINDINGS: Postoperative changes from right frontal cranioplasty. There  is fluid attenuated signal seen superficial to the cranioplasty flap  within the scalp, increased from the postoperative 10/04/2022 CT head  exam, concerning for pseudomeningocele. There is questionable subdural  hemorrhage seen deep to the cranioplasty flap, however T2 gradient  sequences are not obtained. There is increased FLAIR signal within the  underlying right frontal lobe parenchyma. There is no midline shifting.  There is mild generalized volume loss. No intra-axial mass is  identified.       Impression:      1. Limited exam. Patient is able to tolerate only axial diffusion  weighted, FLAIR, T1 sequences with a single sagittal T1 sequence. No  additional imaging or contrast administered due to patient unable to  tolerate study.  2. Right frontal cranioplasty changes. Increasing fluid collection  superficial to the cranioplasty flap within the scalp concerning for  pseudomeningocele. Questionable small subdural hemorrhage deep to the  cranioplasty site. Mild hyperintense FLAIR signal noted within the  underlying right frontal lobe parenchyma.  This report was finalized on 03/31/2023 17:20 by Dr. Coleen Terry MD.    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     DLP in mGycm= 413.     Right frontal craniectomy.     Large right frontal scalp fluid collection measuring approximately 8 cm  in diameter and  1.8 cm in thickness.  There is a 5 mm rim of low density fluid deep to the synthetic catheter  placed at the right frontal craniectomy site.  Deep to this rim of fluid is a 7 mm thick rim of acute hemorrhage.     There is no significant mass effect.  Mild right frontal white matter edema.     Normal ventricle size.  No parenchymal hemorrhage or acute infarct.  No midline shift.     Summary:  1. Postsurgical changes with right frontal scalp fluid collection and a  small amount of right frontal ventricular cranial hemorrhage. No midline  shift or large intracranial hematoma.                                   This report was finalized on 03/31/2023 15:44 by Dr. Ghulam Cano MD.    IR LUMBAR PUNCTURE DIAGNOSTIC [[id number]] Collected: 03/31/23 1528     Updated: 03/31/23 1535    Narrative:      EXAMINATION: IR LUMBAR PUNCTURE DIAGNOSTIC-     3/31/2023 2:33 PM CDT     HISTORY: Pseudomeningocele concern for postoperative intracranial  infection     The procedure was explained to the patient. The benefits, and the risk  and complications were discussed. The patient understood the procedure  and signed the consent.     The patient was positioned on the fluoroscopy table in prone position  and lumbar spine was examined. A suitable spot opposite space L1-2 was  selected for puncture. The spot was marked with an ink marker.     After sterile preparation and application of local anesthesia a size  20-gauge spinal needle was introduced into the thecal sac and a free  flow of clear colorless thin CSF was established. The outer hub of the  spinal needle was attached to a pressure measuring device and opening  pressure was measured. The opening pressure is 23 cm water. Subsequently  27 mL of CSF was removed and and displaced and 4 separate test tubes  which were sent to the laboratory for further evaluation as instructed  by the attending physician. The needle was then withdrawn after  replacing the stylus. The puncture site was  covered with a sterile  Band-Aid.     Patient tolerated the procedure well. No immediate complications were  noted.     The postprocedure care instructions were given to the patient.       Impression:      1. A successful lumbar puncture and opening and closing pressure  measurements.  2. Fluoroscopy time: 1 minute 14 seconds.  3. The dose: 101mGy.  4. Number of images: 1  This report was finalized on 03/31/2023 15:32 by Dr. Michelle Vigil MD.    XR Chest PA & Lateral [[id number]] Collected: 03/31/23 1452     Updated: 03/31/23 1458    Narrative:      HISTORY: Dyspnea     CXR: 2 views the chest are obtained.     COMPARISON: 09/26/2022     FINDINGS: There is an increasing prominence of the interstitial markings  diffusely. Anterior upper lobe consolidation on the lateral view is not  identified of the frontal exam and most likely secondary to soft tissue  artifact/attenuation. The heart is upper limits of normal in size. No  pleural effusion or pneumothorax identified. No acute regional bony  pathology. Chronic calcifications adjacent to the bilateral shoulders.       Impression:      1. Increasing interstitial densities may relate to mild edema/volume  overload. Pneumonitis considered. Soft tissue attenuation/artifact  suspected on the lateral view.  This report was finalized on 03/31/2023 14:55 by Dr. Coleen Terry MD.        Lab Results (last 24 hours)     Procedure Component Value Units Date/Time    High Sensitivity Troponin T [[id number]] Collected: 04/01/23 0804    Specimen: Blood Updated: 04/01/23 0819    STAT Lactic Acid, Reflex [[id number]] Collected: 04/01/23 0804    Specimen: Blood Updated: 04/01/23 0819    Lactic Acid, Plasma [[id number]]  (Abnormal) Collected: 04/01/23 0559    Specimen: Blood Updated: 04/01/23 0637     Lactate 2.6 mmol/L     High Sensitivity Troponin T 2Hr [[id number]]  (Abnormal) Collected: 04/01/23 0559    Specimen: Blood Updated: 04/01/23 0636     HS Troponin T 350 ng/L      Troponin T  Delta 45 ng/L     Narrative:      High Sensitive Troponin T Reference Range:  <10.0 ng/L- Negative Female for AMI  <15.0 ng/L- Negative Male for AMI  >=10 - Abnormal Female indicating possible myocardial injury.  >=15 - Abnormal Male indicating possible myocardial injury.   Clinicians would have to utilize clinical acumen, EKG, Troponin, and serial changes to determine if it is an Acute Myocardial Infarction or myocardial injury due to an underlying chronic condition.         Basic Metabolic Panel [[id number]]  (Abnormal) Collected: 04/01/23 0559    Specimen: Blood Updated: 04/01/23 0636     Glucose 281 mg/dL      BUN 35 mg/dL      Creatinine 1.26 mg/dL      Sodium 136 mmol/L      Potassium 4.5 mmol/L      Chloride 97 mmol/L      CO2 19.0 mmol/L      Calcium 9.0 mg/dL      BUN/Creatinine Ratio 27.8     Anion Gap 20.0 mmol/L      eGFR 62.5 mL/min/1.73     Narrative:      GFR Normal >60  Chronic Kidney Disease <60  Kidney Failure <15      Blood Gas, Arterial - [[id number]]  (Abnormal) Collected: 04/01/23 0549    Specimen: Arterial Blood Updated: 04/01/23 0547     Site Right Radial     Marek's Test Positive     pH, Arterial 7.341 pH units      Comment: 84 Value below reference range        pCO2, Arterial 32.0 mm Hg      Comment: 84 Value below reference range        pO2, Arterial 95.4 mm Hg      HCO3, Arterial 17.3 mmol/L      Comment: 84 Value below reference range        Base Excess, Arterial -7.4 mmol/L      Comment: 84 Value below reference range        O2 Saturation, Arterial 97.1 %      Temperature 37.0 C      Barometric Pressure for Blood Gas 741 mmHg      Modality Heated HFNC     FIO2 100 %      Flow Rate 40.0 lpm      Ventilator Mode NA     Collected by [id number]     Comment: Meter: [id number]     :  [id number]        pCO2, Temperature Corrected 32.0 mm Hg      pH, Temp Corrected 7.341 pH Units      pO2, Temperature Corrected 95.4 mm Hg     Comprehensive Metabolic Panel [[id number]]  (Abnormal)  Collected: 04/01/23 0435    Specimen: Blood Updated: 04/01/23 0521     Glucose 261 mg/dL      BUN 34 mg/dL      Creatinine 1.20 mg/dL      Sodium 137 mmol/L      Potassium 3.9 mmol/L      Chloride 99 mmol/L      CO2 16.0 mmol/L      Calcium 8.7 mg/dL      Total Protein 7.4 g/dL      Albumin 3.5 g/dL      ALT (SGPT) 11 U/L      AST (SGOT) 28 U/L      Alkaline Phosphatase 111 U/L      Total Bilirubin 1.2 mg/dL      Globulin 3.9 gm/dL      A/G Ratio 0.9 g/dL      BUN/Creatinine Ratio 28.3     Anion Gap 22.0 mmol/L      eGFR 66.3 mL/min/1.73     Narrative:      GFR Normal >60  Chronic Kidney Disease <60  Kidney Failure <15      High Sensitivity Troponin T [[id number]]  (Abnormal) Collected: 04/01/23 0435    Specimen: Blood Updated: 04/01/23 0520     HS Troponin T 305 ng/L     Narrative:      High Sensitive Troponin T Reference Range:  <10.0 ng/L- Negative Female for AMI  <15.0 ng/L- Negative Male for AMI  >=10 - Abnormal Female indicating possible myocardial injury.  >=15 - Abnormal Male indicating possible myocardial injury.   Clinicians would have to utilize clinical acumen, EKG, Troponin, and serial changes to determine if it is an Acute Myocardial Infarction or myocardial injury due to an underlying chronic condition.         CBC Auto Differential [[id number]]  (Abnormal) Collected: 04/01/23 0435    Specimen: Blood Updated: 04/01/23 0444     WBC 19.05 10*3/mm3      RBC 4.40 10*6/mm3      Hemoglobin 13.3 g/dL      Hematocrit 42.2 %      MCV 95.9 fL      MCH 30.2 pg      MCHC 31.5 g/dL      RDW 12.1 %      RDW-SD 42.8 fl      MPV 11.4 fL      Platelets 224 10*3/mm3      Neutrophil % 73.6 %      Lymphocyte % 13.5 %      Monocyte % 11.3 %      Eosinophil % 0.2 %      Basophil % 0.5 %      Immature Grans % 0.9 %      Neutrophils, Absolute 14.02 10*3/mm3      Lymphocytes, Absolute 2.58 10*3/mm3      Monocytes, Absolute 2.15 10*3/mm3      Eosinophils, Absolute 0.03 10*3/mm3      Basophils, Absolute 0.10 10*3/mm3       Immature Grans, Absolute 0.17 10*3/mm3      nRBC 0.0 /100 WBC     Blood Gas, Arterial - [[id number]]  (Abnormal) Collected: 04/01/23 0406    Specimen: Arterial Blood Updated: 04/01/23 0407     Site Right Brachial     Marek's Test N/A     pH, Arterial 7.372 pH units      pCO2, Arterial 30.4 mm Hg      Comment: 84 Value below reference range        pO2, Arterial 50.3 mm Hg      Comment: 85 Value below critical limit        HCO3, Arterial 17.6 mmol/L      Comment: 84 Value below reference range        Base Excess, Arterial -6.4 mmol/L      Comment: 84 Value below reference range        O2 Saturation, Arterial 83.4 %      Comment: 84 Value below reference range        Temperature 37.0 C      Barometric Pressure for Blood Gas 741 mmHg      Modality Nasal Cannula     Flow Rate 4.5 lpm      Ventilator Mode NA     Notified [facility] [id number]     Notified By [id number]     Notified Time 04/01/2023 04:07     Collected by [id number]     Comment: Meter: [id number]     :  [id number]        pCO2, Temperature Corrected 30.4 mm Hg      pH, Temp Corrected 7.372 pH Units      pO2, Temperature Corrected 50.3 mm Hg     POC Glucose Once [[id number]]  (Abnormal) Collected: 04/01/23 0340    Specimen: Blood Updated: 04/01/23 0351     Glucose 234 mg/dL      Comment: : [id number] Valeria Chao ID: [id number]       Urinalysis With Culture If Indicated - Urine, Clean Catch [[id number]]  (Abnormal) Collected: 03/31/23 2346    Specimen: Urine, Clean Catch Updated: 04/01/23 0042     Color, UA Orange     Appearance, UA Cloudy     pH, UA <=5.0     Specific Gravity, UA >1.030     Glucose,  mg/dL (Trace)     Ketones, UA Negative     Bilirubin, UA Small (1+)     Blood, UA Negative     Protein,  mg/dL (2+)     Leuk Esterase, UA Small (1+)     Nitrite, UA Positive     Urobilinogen, UA 2.0 E.U./dL    Narrative:      Dipstick results may be inaccurate due to color interference.    Urinalysis, Microscopic Only - Urine, Clean Catch [[id number]]   (Abnormal) Collected: 03/31/23 2346    Specimen: Urine, Clean Catch Updated: 04/01/23 0042     RBC, UA 3-5 /HPF      WBC, UA Too Numerous to Count /HPF      Bacteria, UA 1+ /HPF      Squamous Epithelial Cells, UA 7-12 /HPF      Yeast, UA Small/1+ Yeast /HPF      Hyaline Casts, UA 7-12 /LPF      Granular Casts, UA 3-6 /LPF      Methodology Manual Light Microscopy    Urine Culture - Urine, Urine, Clean Catch [[id number]] Collected: 03/31/23 2346    Specimen: Urine, Clean Catch Updated: 04/01/23 0042    POC Glucose Once [[id number]]  (Abnormal) Collected: 03/31/23 1703    Specimen: Blood Updated: 03/31/23 1718     Glucose 206 mg/dL      Comment: : [id number] Richard Reinoso ID: [id number]       Cell Count With Differential, CSF Use CSF Tube: 1 [[id number]]  (Abnormal) Collected: 03/31/23 1450    Specimen: Cerebrospinal Fluid from Lumbar Puncture Updated: 03/31/23 1556    Narrative:      The following orders were created for panel order Cell Count With Differential, CSF Use CSF Tube: 1.  Procedure                               Abnormality         Status                     ---------                               -----------         ------                     Cell Count, CSF - Cerebr...[[id number]]  Abnormal            Final result                 Please view results for these tests on the individual orders.    Cell Count, CSF - Cerebrospinal Fluid, Lumbar Puncture [[id number]]  (Abnormal) Collected: 03/31/23 1450    Specimen: Cerebrospinal Fluid from Lumbar Puncture Updated: 03/31/23 1556     Color, CSF Colorless     Supernatant Color, CSF Colorless     Appearance, CSF Clear     RBC, CSF 18 /mm3      Nucleated Cells, CSF 3 /mm3      Volume, CSF 26.0 mL      Tube Number, CSF 1     Method: Hemacytometer Method    Narrative:      Differential not indicated.    Protein, CSF - Cerebrospinal Fluid, Lumbar Puncture [[id number]]  (Abnormal) Collected: 03/31/23 1450    Specimen: Cerebrospinal Fluid from Lumbar Puncture Updated:  03/31/23 1554     Protein, Total (CSF) 90.9 mg/dL     Glucose, CSF - Cerebrospinal Fluid, Lumbar Puncture [[id number]]  (Abnormal) Collected: 03/31/23 1450    Specimen: Cerebrospinal Fluid from Lumbar Puncture Updated: 03/31/23 1554     Glucose,  mg/dL     AFB Culture - Cerebrospinal Fluid, Lumbar Puncture [[id number]] Collected: 03/31/23 1450    Specimen: Cerebrospinal Fluid from Lumbar Puncture Updated: 03/31/23 1521    Culture, CSF - Cerebrospinal Fluid, Lumbar Puncture [[id number]] Collected: 03/31/23 1450    Specimen: Cerebrospinal Fluid from Lumbar Puncture Updated: 03/31/23 1521    Sedimentation Rate [[id number]]  (Abnormal) Collected: 03/31/23 1248    Specimen: Blood Updated: 03/31/23 1335     Sed Rate 60 mm/hr     Protime-INR [[id number]]  (Abnormal) Collected: 03/31/23 1249    Specimen: Blood Updated: 03/31/23 1332     Protime 14.9 Seconds      INR 1.16    aPTT [[id number]]  (Normal) Collected: 03/31/23 1249    Specimen: Blood Updated: 03/31/23 1332     PTT 34.3 seconds     Blood Culture - Blood, Arm, Right [[id number]] Collected: 03/31/23 1251    Specimen: Blood from Arm, Right Updated: 03/31/23 1323    Blood Culture - Blood, Arm, Left [[id number]] Collected: 03/31/23 1249    Specimen: Blood from Arm, Left Updated: 03/31/23 1323    POC Glucose Once [[id number]]  (Abnormal) Collected: 03/31/23 1257    Specimen: Blood Updated: 03/31/23 1309     Glucose 272 mg/dL      Comment: : [id number] Fatimah WellbornMeter ID: [id number]             [id number]  Flaco Portillo MD

## 2023-04-02 PROBLEM — I5A MYOCARDIAL INJURY: Status: ACTIVE | Noted: 2023-04-02

## 2023-04-02 NOTE — PROGRESS NOTES
RT EQUIPMENT DEVICE RELATED - SKIN ASSESSMENT    RT Medical Equipment/Device:     NIV Mask:  Full-face    size: Large    Skin Assessment:      Nose:  Intact    Device Skin Pressure Protection:  Skin-to-device areas padded:  Hydrocolloid nasal strips on bridge of nose    Nurse Notification:  Kylie Guzman, RRT

## 2023-04-02 NOTE — PROGRESS NOTES
Neurosurgery Daily Progress Note    HPI:  Elijah Escobar is a 67 y.o. male with a significant medical history of hypertension, diabetes, hyperlipidemia, seizures, craniotomy for tumor (6/16/2022), craniotomy for washout (7/1/2022), craniectomy (10/4/2022), coronary artery disease, diabetes, erectile dysfunction, GERD, hypertension, hyperlipidemia, tobacco abuse, and obesity.  He presents today with a complaint of a 4-5 days onset of progressively worsening right frontal, temporal, and periorbital edema and associated symptoms to include, but not limited to generalized fatigue, chills, dyspnea, intermittent nausea without vomiting, and states he's felt feverish.  Physical exam findings of neurologically intact with right frontal, temporal, and periorbital swelling.  WBC elevated at 15.  3 to an CRP elevated at 14.75.  Imaging pending.    Assessment:   Past Medical History:   Diagnosis Date   • Brain tumor    • Coronary artery disease    • COVID-19 vaccine series completed     MODERNA X 3; LAST DOSE 3/2022   • Diabetes    • Erectile dysfunction    • GERD (gastroesophageal reflux disease)    • Hypercholesteremia    • Hypertension    • Seizure      Active Hospital Problems    Diagnosis    • **Facial swelling    • Acute pulmonary edema due to A-fib RVR    • Acute respiratory failure with hypoxia    • Paroxysmal atrial fibrillation with rapid ventricular response    • Type 2 diabetes mellitus with hyperglycemia and peripheral neuropathy, with long-term current use of insulin (HCC)    • Coronary artery disease    • Meningioma s/p craniotomy        Plan:   Neuro: Stable, intact  Infected cranial flap   Plan for OR early next week, Monday if cleared by hospitalist   Flap tapped yesterday with delores purulence.  Awaiting cultures.         CV: AFib with RVR and heart strain and trop bump   Stabilized    Appreciate Cards  Pulm: Possible PNA with desats overnight   Vapotherm and CPAP   Abx per hospitalist  :  No jorge  FEN:  "Reg diet for now   Fluid balance for sepsis vs pulmonary edema  Endocrine: SSI with blood glucose in 300s.     Consider Insulin gtt  GI: SHERIF  ID: WBC 17, CRP and ESR elevated, febrile overnight   PNA   Infected cranial flap   UTI with cultures pending.    CSF: no growth to date   Cont Zosyn  Heme:  DVT prophylaxis  Pain: well controlled  Dispo: pending removal of cranial flap      Chief complaint:   SHERIF    HPI  Subjective  Doing well    Temp:  [98 °F (36.7 °C)-100.4 °F (38 °C)] 98.7 °F (37.1 °C)  Heart Rate:  [] 86  Resp:  [20-36] 24  BP: ()/() 128/76    Output by Drain (mL) 04/01/23 0701 - 04/01/23 1900 04/01/23 1901 - 04/02/23 0700 04/02/23 0701 - 04/02/23 0948 Range Total   Patient has no LDAs of requested type attached.       Objective:  Vital signs: (most recent): Blood pressure 128/76, pulse 86, temperature 98.7 °F (37.1 °C), temperature source Axillary, resp. rate 24, height 177.8 cm (70\"), weight 119 kg (261 lb 4.8 oz), SpO2 99 %.        Neurologic Exam     Mental Status   Oriented to person, place, and time.   Speech: speech is normal   Level of consciousness: alert    Cranial Nerves     CN III, IV, VI   Pupils are equal, round, and reactive to light.  Extraocular motions are normal.     CN V   Facial sensation intact.     CN VII   Facial expression full, symmetric.     Motor Exam   Right arm pronator drift: absent  Left arm pronator drift: absent    Strength   Right deltoid: 5/5  Left deltoid: 5/5  Right biceps: 5/5  Left biceps: 5/5  Right triceps: 5/5  Left triceps: 5/5  Right iliopsoas: 5/5  Left iliopsoas: 5/5  Right quadriceps: 5/5  Left quadriceps: 5/5  Right gastroc: 5/5  Left gastroc: 5/5    Sensory Exam   Light touch normal.       Drains: * No LDAs found *    Imaging Results (Last 24 Hours)     ** No results found for the last 24 hours. **        Lab Results (last 24 hours)     Procedure Component Value Units Date/Time    Wound Culture - Aspirate, Forehead [[id number]] Collected: " 04/01/23 1120    Specimen: Aspirate from Forehead Updated: 04/02/23 0832     Wound Culture Culture in progress     Gram Stain Many (4+) WBCs seen      No organisms seen    POC Glucose Once [[id number]]  (Abnormal) Collected: 04/02/23 0757    Specimen: Blood Updated: 04/02/23 0808     Glucose 307 mg/dL      Comment: : [id number] Felipe HeatherMeter ID: [id number]       Culture, CSF - Cerebrospinal Fluid, Lumbar Puncture [[id number]] Collected: 03/31/23 1450    Specimen: Cerebrospinal Fluid from Lumbar Puncture Updated: 04/02/23 0721     CSF Culture No growth     Gram Stain No No organisms seen      Occasional WBCs seen    Basic Metabolic Panel [[id number]]  (Abnormal) Collected: 04/02/23 0613    Specimen: Blood Updated: 04/02/23 0641     Glucose 331 mg/dL      BUN 39 mg/dL      Creatinine 1.17 mg/dL      Sodium 135 mmol/L      Potassium 4.4 mmol/L      Comment: Slight hemolysis detected by analyzer. Results may be affected.        Chloride 99 mmol/L      CO2 15.0 mmol/L      Calcium 8.3 mg/dL      BUN/Creatinine Ratio 33.3     Anion Gap 21.0 mmol/L      eGFR 68.3 mL/min/1.73     Narrative:      GFR Normal >60  Chronic Kidney Disease <60  Kidney Failure <15      CBC (No Diff) [[id number]]  (Abnormal) Collected: 04/02/23 0613    Specimen: Blood Updated: 04/02/23 0624     WBC 17.92 10*3/mm3      RBC 3.95 10*6/mm3      Hemoglobin 11.9 g/dL      Hematocrit 36.6 %      MCV 92.7 fL      MCH 30.1 pg      MCHC 32.5 g/dL      RDW 12.2 %      RDW-SD 41.3 fl      MPV 11.7 fL      Platelets 192 10*3/mm3     STAT Lactic Acid, Reflex [[id number]]  (Abnormal) Collected: 04/01/23 2340    Specimen: Blood Updated: 04/02/23 0031     Lactate 2.1 mmol/L     POC Glucose Once [[id number]]  (Abnormal) Collected: 04/01/23 2117    Specimen: Blood Updated: 04/01/23 2128     Glucose 188 mg/dL      Comment: : [id number] Meek KemperMeter ID: [id number]       Blood Gas, Arterial - [[id number]]  (Abnormal) Collected: 04/01/23 2054    Specimen:  Arterial Blood Updated: 04/01/23 2053     Site Right Brachial     Marek's Test N/A     pH, Arterial 7.449 pH units      pCO2, Arterial 30.7 mm Hg      Comment: 84 Value below reference range        pO2, Arterial 52.9 mm Hg      Comment: 85 Value below critical limit        HCO3, Arterial 21.3 mmol/L      Base Excess, Arterial -1.8 mmol/L      Comment: 84 Value below reference range        O2 Saturation, Arterial 88.0 %      Comment: 84 Value below reference range        Temperature 37.0 C      Barometric Pressure for Blood Gas 752 mmHg      Modality Heated HFNC     FIO2 100 %      Flow Rate 40.0 lpm      Ventilator Mode NA     Notified Who HIRAM ZAPATA RN     Notified By [id number]     Notified Time 04/01/2023 20:55     Collected by [id number]     Comment: Meter: [id number]     :  [id number]        pCO2, Temperature Corrected 30.7 mm Hg      pH, Temp Corrected 7.449 pH Units      pO2, Temperature Corrected 52.9 mm Hg     STAT Lactic Acid, Reflex [[id number]]  (Abnormal) Collected: 04/01/23 1809    Specimen: Blood Updated: 04/01/23 1838     Lactate 2.9 mmol/L     POC Glucose Once [[id number]]  (Abnormal) Collected: 04/01/23 1657    Specimen: Blood Updated: 04/01/23 1708     Glucose 247 mg/dL      Comment: : [id number] Erica Gutierrez ID: [id number]       Blood Culture - Blood, Arm, Right [[id number]]  (Normal) Collected: 03/31/23 1251    Specimen: Blood from Arm, Right Updated: 04/01/23 1330     Blood Culture No growth at 24 hours    Blood Culture - Blood, Arm, Left [[id number]]  (Normal) Collected: 03/31/23 1249    Specimen: Blood from Arm, Left Updated: 04/01/23 1330     Blood Culture No growth at 24 hours    AFB Culture - Cerebrospinal Fluid, Lumbar Puncture [[id number]] Collected: 03/31/23 1450    Specimen: Cerebrospinal Fluid from Lumbar Puncture Updated: 04/01/23 1320     AFB Stain No acid fast bacilli seen on direct smear    STAT Lactic Acid, Reflex [[id number]]  (Abnormal) Collected: 04/01/23 1220     Specimen: Blood Updated: 04/01/23 1248     Lactate 2.4 mmol/L     POC Glucose Once [[id number]]  (Abnormal) Collected: 04/01/23 1147    Specimen: Blood Updated: 04/01/23 1157     Glucose 268 mg/dL      Comment: : [id number] Felipe HeatherMeter ID: [id number]       STAT Lactic Acid, Reflex [[id number]]  (Abnormal) Collected: 04/01/23 0918    Specimen: Blood Updated: 04/01/23 0959     Lactate 2.7 mmol/L           [id number]  Flaco Portillo MD

## 2023-04-02 NOTE — PLAN OF CARE
Goal Outcome Evaluation:              Outcome Evaluation: C/O of pain. Oxycodone x1 this shift. Cardizem restarted--currently @ 10. Afib between 100-120. Patient placed on bipap for ~4hr due to oxygen saturation. Back on vapotherm @ 40/100. Episode of significant disorientation with anxiety, diaphoresis, and SOB. Pt calmed down after reorientation. Safety maintained.

## 2023-04-02 NOTE — CONSULTS
[facility] – [place] PULMONARY & CRITICAL CARE CONSULT - [facility]    23, 15:51 CDT  Patient Care Team:  Brianna Martinez APRN as PCP - General (Nurse Practitioner)  Rahul Arnold APRN as Nurse Practitioner (Nurse Practitioner)  Flaco Portillo MD as Surgeon (Neurosurgery)  Name: Elijah Escobar  : 1955  MRN: [id number]  Contact Serial Number [id number]    Chief complaint: Respiratory failure, shortness of breath, bilateral lung infiltrate, congestive diastolic heart failure, atrial fibrillation COPD, tobacco abuse, obesity, possible obstructive sleep apnea    HPI:  We have been consulted by Flaco Portillo, * to see this 67 y.o. male.  Patient is a middle-aged -American gentleman who was seen as a pulmonary consult in the intensive care unit today.    He had multiple hospitalizations in [facility] since last year.  He had a history of brain tumor and had a craniotomy done in 2022 and he had multiple interventions after the surgery including craniotomy washout craniectomy and still had a flap in place.  He also has hypertension hyperlipidemia, seizure disorder, diabetes mellitus, hyperlipidemia, erectile dysfunction, tobacco abuse and morbid obesity.  Patient this time was admitted under Dr. Portillo 2 days ago with worsening frontal temporal and periorbital edema on the right side and associated fever chills and shortness of breath with intermittent nausea without vomiting.  His physical examination showed no neurologic abnormality.  White cell count was elevated at 15.3 and a CRP was elevated 14.75.  His procalcitonin was 0.78 and hemoglobin A1c was 11.0.  Lactate was 2.1.  He had high-sensitivity troponin elevated to 409 and proBNP was also elevated to 10,168.  His lab work also showed leukocytosis with worsening white cell count 17.92.    Hospitalist team and cardiology has been consulted.  Patient was seen by Dr. Singh.  Patient is currently he is on  Cardizem drip which is changed to oral Cardizem by Dr. Quinones.  His blood gas shows hypoxia with PO2 of 52.2.  He was placed on BiPAP with a setting of 16/8 with rate of 20 and FiO2 80%.  His oxygen saturation maintained in the upper 90s but when the BiPAP is taken off and he was placed on Vapotherm with 35 L flow and 80% FiO2 he felt short of breath and wanted his BiPAP back on.    Dr. Singh talked to cardiologist Dr. Quinones and he feels the patient's shortness of breath is not due to cardiac issues but it could be from pulmonary issues.  Pulmonary consult requested for further assessment.  His chest x-ray reviewed showed cardiomegaly and bilateral pulmonary infiltrates.  The patient is already started on Zosyn and vancomycin for possible pneumonia.  Pulmonary team was consulted for further management.  Patient told me he was an active smoker but quit smoking 3 weeks ago.  He is a  but did not have any inhalers or oxygen at home and did not use any CPAP antibiotic pulmonary function test or sleep study done.  He is nonvaccinated for COVID or influenza.    Past Medical History:   has a past medical history of Brain tumor, Coronary artery disease, COVID-19 vaccine series completed, Diabetes, Erectile dysfunction, GERD (gastroesophageal reflux disease), Hypercholesteremia, Hypertension, and Seizure.   has a past surgical history that includes Colonoscopy; Balloon angioplasty, artery (Right); Craniotomy for Tumor (Right, 6/16/2022); Craniectomy (Right, 7/1/2022); and Cranioplasty (Right, 10/4/2022).  No Known Allergies  Medications:  aspirin, 81 mg, Oral, Daily  atorvastatin, 20 mg, Oral, Nightly  budesonide-formoterol, 2 puff, Inhalation, BID - RT  dilTIAZem, 60 mg, Oral, Q6H  enoxaparin, 1 mg/kg, Subcutaneous, Q12H  furosemide, 40 mg, Intravenous, Q12H  gabapentin, 300 mg, Oral, TID  gadobenate dimeglumine, 20 mL, Intravenous, Once in imaging  ipratropium-albuterol, 3 mL, Nebulization, 4x Daily -  RT  levETIRAcetam, 500 mg, Oral, BID  methylPREDNISolone sodium succinate, 60 mg, Intravenous, Q8H  metoprolol tartrate, 50 mg, Oral, Q12H  pantoprazole, 40 mg, Oral, Q AM  piperacillin-tazobactam, 4.5 g, Intravenous, Q8H  polyethylene glycol, 17 g, Oral, Daily  sennosides-docusate, 1 tablet, Oral, Daily  sodium chloride, 10 mL, Intravenous, Q12H  sodium chloride, 10 mL, Intravenous, Q12H  vancomycin, 1,000 mg, Intravenous, Q12H      dilTIAZem, 5-15 mg/hr, Last Rate: 5 mg/hr (04/02/23 0933)  insulin, 0-100 Units/hr, Last Rate: 6.8 Units/hr (04/02/23 1535)  Pharmacy to Dose enoxaparin (LOVENOX),       Family History:  Family History   Problem Relation Age of Onset   • Cancer Mother         liver   • Heart disease Father      Social History:   reports that he has quit smoking. His smoking use included cigarettes. He smoked an average of .25 packs per day. He does not have any smokeless tobacco history on file. He reports that he does not drink alcohol and does not use drugs.  Review of Systems:  Review of Systems   Constitutional: Positive for chills, fatigue and fever.   HENT: Positive for congestion, rhinorrhea and sinus pressure.    Eyes: Negative.    Respiratory: Positive for cough, chest tightness and shortness of breath.    Cardiovascular: Positive for palpitations and leg swelling. Negative for chest pain.   Gastrointestinal: Negative.    Endocrine: Negative.    Genitourinary: Negative.    Musculoskeletal: Positive for arthralgias and back pain.   Skin: Negative.    Allergic/Immunologic: Positive for environmental allergies.   Neurological: Positive for seizures and weakness.   Hematological: Negative.    Psychiatric/Behavioral: Negative.       Physical Exam:  Temp:  [98 °F (36.7 °C)-100.4 °F (38 °C)] 98.1 °F (36.7 °C)  Heart Rate:  [] 110  Resp:  [22-36] 26  BP: ()/() 110/98    Intake/Output Summary (Last 24 hours) at 4/2/2023 1551  Last data filed at 4/2/2023 1142  Gross per 24 hour   Intake  697.6 ml   Output 350 ml   Net 347.6 ml         04/01/23  1617 04/02/23  0515   Weight: 117 kg (259 lb) 119 kg (261 lb 4.8 oz)     SpO2 Percentage    04/02/23 1457 04/02/23 1509 04/02/23 1515   SpO2: 92% 100% 100%     Body mass index is 37.49 kg/m².   Physical Exam  Constitutional:       General: He is not in acute distress.     Appearance: He is obese. He is ill-appearing.      Comments: Obese middle-aged -American gentleman lying in the bed on BiPAP   HENT:      Head: Normocephalic and atraumatic.      Right Ear: Tympanic membrane normal.      Left Ear: Tympanic membrane normal. There is no impacted cerumen.      Nose: Nose normal. No congestion or rhinorrhea.      Mouth/Throat:      Mouth: Mucous membranes are moist.      Pharynx: Oropharynx is clear. No oropharyngeal exudate or posterior oropharyngeal erythema.   Eyes:      General: No scleral icterus.        Right eye: No discharge.         Left eye: No discharge.      Extraocular Movements: Extraocular movements intact.      Conjunctiva/sclera: Conjunctivae normal.      Pupils: Pupils are equal, round, and reactive to light.   Neck:      Vascular: No carotid bruit.   Cardiovascular:      Rate and Rhythm: Tachycardia present. Rhythm irregular.      Pulses: Normal pulses.      Heart sounds: Normal heart sounds. No murmur heard.    No gallop.   Pulmonary:      Effort: Pulmonary effort is normal. No respiratory distress.      Breath sounds: Wheezing and rales present.      Comments: Decreased breath sound both lungs  Abdominal:      General: Abdomen is flat. Bowel sounds are normal. There is no distension.      Palpations: Abdomen is soft. There is no mass.      Tenderness: There is no abdominal tenderness. There is no right CVA tenderness, left CVA tenderness, guarding or rebound.   Genitourinary:     Comments: Not examined.  Musculoskeletal:         General: No swelling, tenderness, deformity or signs of injury.      Cervical back: Normal range of motion  and neck supple. No rigidity or tenderness.      Right lower leg: No edema.   Lymphadenopathy:      Cervical: No cervical adenopathy.   Skin:     General: Skin is warm and dry.      Capillary Refill: Capillary refill takes less than 2 seconds.      Findings: No bruising, erythema, lesion or rash.   Neurological:      General: No focal deficit present.      Mental Status: He is alert and oriented to person, place, and time.      Cranial Nerves: No cranial nerve deficit.      Sensory: No sensory deficit.      Motor: Weakness present.      Deep Tendon Reflexes: Reflexes normal.   Psychiatric:         Mood and Affect: Mood normal.         Behavior: Behavior normal.         Thought Content: Thought content normal.       Result Review  Results from last 7 days   Lab Units 04/02/23  0613 04/01/23  0435 03/31/23  1020   WBC 10*3/mm3 17.92* 19.05* 15.32*   HEMOGLOBIN g/dL 11.9* 13.3 13.5   PLATELETS 10*3/mm3 192 224 214     Results from last 7 days   Lab Units 04/02/23  0613 04/01/23  0559 04/01/23  0435   SODIUM mmol/L 135* 136 137   POTASSIUM mmol/L 4.4 4.5 3.9   CO2 mmol/L 15.0* 19.0* 16.0*   BUN mg/dL 39* 35* 34*   CREATININE mg/dL 1.17 1.26 1.20   GLUCOSE mg/dL 331* 281* 261*     Results from last 7 days   Lab Units 04/02/23  1455 04/01/23  2054 04/01/23  0549   PH, ARTERIAL pH units 7.503* 7.449 7.341*   PCO2, ARTERIAL mm Hg 28.3* 30.7* 32.0*   PO2 ART mm Hg 52.2* 52.9* 95.4   FIO2 % 80 100 100     Microbiology Results (last 10 days)     Procedure Component Value - Date/Time    Wound Culture - Aspirate, Forehead [[id number]] Collected: 04/01/23 1120    Lab Status: Preliminary result Specimen: Aspirate from Forehead Updated: 04/02/23 0832     Wound Culture Culture in progress     Gram Stain Many (4+) WBCs seen      No organisms seen    Urine Culture - Urine, Urine, Clean Catch [[id number]]  (Normal) Collected: 03/31/23 2346    Lab Status: Final result Specimen: Urine, Clean Catch Updated: 04/02/23 1051     Urine Culture  No growth    AFB Culture - Cerebrospinal Fluid, Lumbar Puncture [[id number]] Collected: 03/31/23 1450    Lab Status: Preliminary result Specimen: Cerebrospinal Fluid from Lumbar Puncture Updated: 04/01/23 1320     AFB Stain No acid fast bacilli seen on direct smear    Culture, CSF - Cerebrospinal Fluid, Lumbar Puncture [[id number]] Collected: 03/31/23 1450    Lab Status: Preliminary result Specimen: Cerebrospinal Fluid from Lumbar Puncture Updated: 04/02/23 0721     CSF Culture No growth     Gram Stain No No organisms seen      Occasional WBCs seen    Blood Culture - Blood, Arm, Right [[id number]]  (Normal) Collected: 03/31/23 1251    Lab Status: Preliminary result Specimen: Blood from Arm, Right Updated: 04/02/23 1330     Blood Culture No growth at 2 days    Blood Culture - Blood, Arm, Left [[id number]]  (Normal) Collected: 03/31/23 1249    Lab Status: Preliminary result Specimen: Blood from Arm, Left Updated: 04/02/23 1330     Blood Culture No growth at 2 days        Recent radiology:   Imaging Results (Last 72 Hours)     Procedure Component Value Units Date/Time    XR Chest 1 View [[id number]] Collected: 04/01/23 0636     Updated: 04/01/23 0641    Narrative:      EXAM/TECHNIQUE: XR CHEST 1 VW-     INDICATION: respiratory distress     COMPARISON: 03/31/2023     FINDINGS:     Cardiac silhouette is within normal limits and stable. No pleural  effusion or visible pneumothorax. Central vascular congestion with  diffuse interstitial opacity, similar to prior exam. No acute osseous  finding.       Impression:         No change in appearance of the chest. Persistent central vascular  congestion with mild diffuse interstitial opacity, which may be related  to mild volume overload/pulmonary edema.  This report was finalized on 04/01/2023 06:38 by Dr. Mk Chinchilla MD.    MRI Brain Without Contrast [[id number]] Collected: 03/31/23 1708     Updated: 03/31/23 1723    Narrative:      HISTORY: Pseudomeningocele, concern for  postcraniotomy infection     MRI BRAIN: Axial and coronal T1, axial DWI and FLAIR sequences only were  obtained. Patient refuses additional imaging. No IV contrast  administered as patient unable to tolerate exam     COMPARISON: CT head 03/31/2023     FINDINGS: Postoperative changes from right frontal cranioplasty. There  is fluid attenuated signal seen superficial to the cranioplasty flap  within the scalp, increased from the postoperative 10/04/2022 CT head  exam, concerning for pseudomeningocele. There is questionable subdural  hemorrhage seen deep to the cranioplasty flap, however T2 gradient  sequences are not obtained. There is increased FLAIR signal within the  underlying right frontal lobe parenchyma. There is no midline shifting.  There is mild generalized volume loss. No intra-axial mass is  identified.       Impression:      1. Limited exam. Patient is able to tolerate only axial diffusion  weighted, FLAIR, T1 sequences with a single sagittal T1 sequence. No  additional imaging or contrast administered due to patient unable to  tolerate study.  2. Right frontal cranioplasty changes. Increasing fluid collection  superficial to the cranioplasty flap within the scalp concerning for  pseudomeningocele. Questionable small subdural hemorrhage deep to the  cranioplasty site. Mild hyperintense FLAIR signal noted within the  underlying right frontal lobe parenchyma.  This report was finalized on 03/31/2023 17:20 by Dr. Coleen Terry MD.    CT Head Without Contrast [[id number]] Collected: 03/31/23 1540     Updated: 03/31/23 1547    Narrative:      EXAMINATION: CT HEAD WO CONTRAST-      3/31/2023 3:16 PM CDT     HISTORY: Pseudomeningocele concern for postcraniotomy infection     In order to have a CT radiation dose as low as reasonably achievable  Automated Exposure Control was utilized for adjustment of the mA and/or  KV according to patient size.     DLP in mGycm= 413.     Right frontal craniectomy.     Large  right frontal scalp fluid collection measuring approximately 8 cm  in diameter and 1.8 cm in thickness.  There is a 5 mm rim of low density fluid deep to the synthetic catheter  placed at the right frontal craniectomy site.  Deep to this rim of fluid is a 7 mm thick rim of acute hemorrhage.     There is no significant mass effect.  Mild right frontal white matter edema.     Normal ventricle size.  No parenchymal hemorrhage or acute infarct.  No midline shift.     Summary:  1. Postsurgical changes with right frontal scalp fluid collection and a  small amount of right frontal ventricular cranial hemorrhage. No midline  shift or large intracranial hematoma.                                   This report was finalized on 03/31/2023 15:44 by Dr. Ghulam Cano MD.    IR LUMBAR PUNCTURE DIAGNOSTIC [[id number]] Collected: 03/31/23 1528     Updated: 03/31/23 1535    Narrative:      EXAMINATION: IR LUMBAR PUNCTURE DIAGNOSTIC-     3/31/2023 2:33 PM CDT     HISTORY: Pseudomeningocele concern for postoperative intracranial  infection     The procedure was explained to the patient. The benefits, and the risk  and complications were discussed. The patient understood the procedure  and signed the consent.     The patient was positioned on the fluoroscopy table in prone position  and lumbar spine was examined. A suitable spot opposite space L1-2 was  selected for puncture. The spot was marked with an ink marker.     After sterile preparation and application of local anesthesia a size  20-gauge spinal needle was introduced into the thecal sac and a free  flow of clear colorless thin CSF was established. The outer hub of the  spinal needle was attached to a pressure measuring device and opening  pressure was measured. The opening pressure is 23 cm water. Subsequently  27 mL of CSF was removed and and displaced and 4 separate test tubes  which were sent to the laboratory for further evaluation as instructed  by the attending physician.  The needle was then withdrawn after  replacing the stylus. The puncture site was covered with a sterile  Band-Aid.     Patient tolerated the procedure well. No immediate complications were  noted.     The postprocedure care instructions were given to the patient.       Impression:      1. A successful lumbar puncture and opening and closing pressure  measurements.  2. Fluoroscopy time: 1 minute 14 seconds.  3. The dose: 101mGy.  4. Number of images: 1  This report was finalized on 03/31/2023 15:32 by Dr. Michelle Vigil MD.    XR Chest PA & Lateral [[id number]] Collected: 03/31/23 1452     Updated: 03/31/23 1458    Narrative:      HISTORY: Dyspnea     CXR: 2 views the chest are obtained.     COMPARISON: 09/26/2022     FINDINGS: There is an increasing prominence of the interstitial markings  diffusely. Anterior upper lobe consolidation on the lateral view is not  identified of the frontal exam and most likely secondary to soft tissue  artifact/attenuation. The heart is upper limits of normal in size. No  pleural effusion or pneumothorax identified. No acute regional bony  pathology. Chronic calcifications adjacent to the bilateral shoulders.       Impression:      1. Increasing interstitial densities may relate to mild edema/volume  overload. Pneumonitis considered. Soft tissue attenuation/artifact  suspected on the lateral view.  This report was finalized on 03/31/2023 14:55 by Dr. Coleen Terry MD.        Personal review of imaging: CXR shows Reviewed chest x-ray March 31st and one x-ray from today which shows some cardiomegaly with bilateral pulm infiltrate  Other test results (not lab or imaging): Results for orders placed during the hospital encounter of 03/31/23    Adult Transthoracic Echo Complete W/ Cont if Necessary Per Protocol    Interpretation Summary  •  Left ventricular systolic function is mildly decreased. Left ventricular ejection fraction appears to be 46 - 50%.  •  Left ventricular wall thickness  is consistent with mild to moderate concentric hypertrophy.  •  The following left ventricular wall segments are hypokinetic: apical anterior, apical lateral, apical inferior, apical septal, apex hypokinetic and mid anteroseptal.  •  Left atrial volume is mildly increased.  •  Estimated right ventricular systolic pressure from tricuspid regurgitation is mildly elevated (35-45 mmHg).  •  Normal size and function the right ventricle.  •  No significant (greater than mild) valvular pathology.  •  Mild dilation of the aortic root is present.  •  Compared to prior exam from 6/19/2022, both studies were technically difficult, but upon my independent review of the previous exam, there did appear to be some mild apical hypokinesis present on that study that does look slightly more prominent on current exam.  Reviewed  Independent review of ekg: Done.  Atrial fibrillation noted  Problem List as identified by Epic (may contain historical, inactive problems)    Facial swelling    Meningioma s/p craniotomy    Coronary artery disease    Type 2 diabetes mellitus with hyperglycemia and peripheral neuropathy, with long-term current use of insulin (HCC)    Paroxysmal atrial fibrillation with rapid ventricular response    Acute pulmonary edema due to A-fib RVR    Acute respiratory failure with hypoxia    Myocardial injury    Pulmonary Assessment:    1. Acute on chronic hypoxic respiratory failure  2. Bilateral pulm infiltrate with leukocytosis  3. Community-acquired pneumonia versus interstitial pneumonitis  4. Pulmonary edema and volume overload  5. Chronic congestive diastolic heart failure  6. Atrial fibrillation  7. COPD with history of tobacco abuse  8. Meningioma s/p craniotomy.    9. S/p washout craniotomy and cranioplasty with flap infection  10. Hypertension  11. Diabetes mellitus insulin-dependent  12. Coronary artery disease  13. Obesity likely obstructive sleep apnea  14. Seizure disorder  15. Chronic  pain    Recommend/plan:   · I reviewed the chest x-ray.  It showed borderline cardiomegaly and bilateral pulm infiltrate  · He also has leukocytosis which could be from the infection from craniotomy flap or from pneumonia  · Bilateral pulm infiltrate could be from pneumonia but pulmonary edema or interstitial lung disease could not be ruled out  · He is already getting diuresis.  Interstitial lung disease is a possibility with pneumonitis.  · He also has a history of tobacco use and possibly a COPD.  I started the patient on Symbicort and DuoNeb and started him on IV Solu-Medrol.  · I ordered a noncontrast CT scan of the chest for further evaluation for possible underlying interstitial lung disease.   · Patient is not hypercapnic but has hypoxia.    · He will be kept on BiPAP overnight and repeat blood gas ordered for tomorrow morning.  Case discussed with respiratory therapist Nehemias  · Management of atrial fibrillation per cardiology.  He was on Cardizem drip and it is getting switch to oral Cardizem  · Patient is started on Zosyn and vancomycin which could be continued.  · I ordered a COVID screening for the patient.  Apparently he is unvaccinated for COVID as he told me.  · We will also get a viral panel urine for Legionella pneumococcal antigen.  His BNP is high.  · He also has elevated CRP which later became normal but his D-dimer is normal  · I also ordered a ANCA and antinuclear factor for further work-up of possible interstitial lung involvement  · Blood pressure and glycemic control prior primary care team.  He is already on insulin for his diabetes.  · He will need tighter glycemic control after he was started on Solu-Medrol.  His hemoglobin A1c is already high  · He will need to quit smoking.  He will need further work-up in the pulmonary clinic with a PFT and sleep study for possible sleep apnea.  · DVT and stress ulcer prophylaxis and pain and anxiety control.  · CODE STATUS: Full.  Overall prognosis:  Guarded  · We appreciate the consult and we will follow.  · Total time spent in seeing this patient as pulmonary consult was 45 minutes    Thank you for this consult.  We will follow along.    Electronically signed by     Tracy Gupta MD,  Pulmonologist/Intensivist   04/02/23, 3:51 PM CDT.

## 2023-04-02 NOTE — CONSULTS
[facility] HEART GROUP CONSULT NOTE    Patient Care Team:  Brianna Martinez APRN as PCP - General (Nurse Practitioner)  Rahul Arnold APRN as Nurse Practitioner (Nurse Practitioner)  Flaco Portillo MD as Surgeon (Neurosurgery)  Flaco Portillo, *  REASON FOR REFERRAL: Atrial fibrillation, elevated troponin  Chief complaint: Facial swelling    Subjective     Patient is a 67 y.o. male who has been admitted here multiple times since last summer, after initial surgery for a brain tumor.  It has been noted in prior records, that I reviewed extensively today, that he has coronary disease reportedly including a prior placement of a stent to the right coronary artery.  Atrial fibrillation is also been loaded listed in his history and observed throughout his prior stays here.  He has been off and on anticoagulation and antiarrhythmic therapy, given different circumstances each admission.  He came back to the ER now a couple days ago with worsening facial swelling.    His oxygen saturation were 92% on room air upon arrival, around 2 AM on 4/1 it appears he was started on nasal cannula that was subsequently escalated to high flow    Thus far, he is receiving therapeutic dose of anticoagulation (120 mg Lovenox 12 hours).  He received a 250 mg microgram IV bolus of digoxin yesterday morning at 451.  He has been receiving Lopressor 50 mg every 12 hours (home medication), and 40 mg of IV Lasix twice yesterday and again once this morning at 930.    Patient tells me he in fact does not have any coronary artery disease, but the stent placed was actually 2 vessel in his leg.  He is familiar of atrial fibrillation, stating that he was initially diagnosed a few years ago in [place] and has been on sotalol since that time.  Even though the discharge summary from last summer here stated he was post to stop it, he states he has been on it the whole time.  He says he is never seen a cardiologist as an  outpatient.    Review of Systems   Review of Systems   Constitutional: Negative for appetite change, chills, fever and unexpected weight change.   HENT:        +facial swelling   Eyes: Negative.    Respiratory: Positive for cough and shortness of breath. Negative for wheezing.    Cardiovascular: Negative for chest pain, palpitations and leg swelling.   Gastrointestinal: Negative for abdominal pain and blood in stool.   Endocrine: Negative.    Genitourinary: Negative for flank pain and hematuria.   Musculoskeletal: Negative.    Skin: Negative.    Allergic/Immunologic: Negative.    Neurological: Positive for seizures and headaches. Negative for syncope.   Hematological: Negative for adenopathy. Does not bruise/bleed easily.   Psychiatric/Behavioral: Negative.        History  Past Medical History:   Diagnosis Date   • Brain tumor    • Coronary artery disease    • COVID-19 vaccine series completed     MODERNA X 3; LAST DOSE 3/2022   • Diabetes    • Erectile dysfunction    • GERD (gastroesophageal reflux disease)    • Hypercholesteremia    • Hypertension    • Seizure      Past Surgical History:   Procedure Laterality Date   • BALLOON ANGIOPLASTY, ARTERY Right    • COLONOSCOPY     • CRANIECTOMY Right 7/1/2022    Procedure: CRANIECTOMY WITH INCISIONAL WASHOUT;  Surgeon: Felipe Banerjee MD;  Location:  [facility] OR;  Service: Neurosurgery;  Laterality: Right;   • CRANIOPLASTY Right 10/4/2022    Procedure: CRANIOPLASTY right with Lumbar drain;  Surgeon: Flaco Portillo MD;  Location:  [facility] OR;  Service: Neurosurgery;  Laterality: Right;   • CRANIOTOMY FOR TUMOR Right 6/16/2022    Procedure: CRANIOTOMY FOR TUMOR STERIOTACTIC WITH BRAIN LAB, right;  Surgeon: Flaco Portillo MD;  Location:  [facility] OR;  Service: Neurosurgery;  Laterality: Right;     Family History   Problem Relation Age of Onset   • Cancer Mother         liver   • Heart disease Father      Social History     Tobacco Use   • Smoking status: Former      Packs/day: 0.25     Types: Cigarettes   Vaping Use   • Vaping Use: Never used   Substance Use Topics   • Alcohol use: Never   • Drug use: Never     Medications Prior to Admission   Medication Sig Dispense Refill Last Dose   • amLODIPine (NORVASC) 10 MG tablet Take 1 tablet by mouth Daily.      • aspirin 81 MG EC tablet Take 1 tablet by mouth Daily.      • atorvastatin (LIPITOR) 20 MG tablet Take 1 tablet by mouth Every Night.      • gabapentin (NEURONTIN) 300 MG capsule Take 1 capsule by mouth 3 (Three) Times a Day.      • insulin detemir (LEVEMIR) 100 UNIT/ML injection Inject 20-35 Units under the skin into the appropriate area as directed 2 (Two) Times a Day. 20u am & 35u hs      • metoprolol tartrate (LOPRESSOR) 50 MG tablet Take 1 tablet by mouth 2 (Two) Times a Day With Meals.      • omeprazole (priLOSEC) 20 MG capsule Take 1 capsule by mouth Daily.      • sotalol (BETAPACE) 120 MG tablet Take 1 tablet by mouth 2 (Two) Times a Day.      • acetaminophen (TYLENOL) 325 MG tablet Take 2 tablets by mouth Every 4 (Four) Hours As Needed for Mild Pain .   Unknown   • HYDROcodone-acetaminophen (NORCO)  MG per tablet Take 1 tablet by mouth 4 (Four) Times a Day As Needed for Moderate Pain.      • levETIRAcetam (KEPPRA) 500 MG tablet Take 1 tablet by mouth 2 (Two) Times a Day.        Allergies:  Patient has no known allergies.    Objective     Vital Signs  Temp:  [98 °F (36.7 °C)-100.4 °F (38 °C)] 98.1 °F (36.7 °C)  Heart Rate:  [] 125  Resp:  [22-36] 23  BP: ()/() 103/71    Net IO Since Admission: 865.1 mL [04/02/23 1413]    Physical Exam:  Vitals and nursing note reviewed.   Constitutional:       General: Not in acute distress.     Appearance: Not in distress.   Neck:      Vascular: No JVD or JVR. JVD normal.   Pulmonary:      Effort: Increased respiratory effort.      Breath sounds: Normal breath sounds.   Cardiovascular:      Tachycardia present. Irregularly irregular rhythm.      Murmurs:  There is no murmur.      No gallop. No rub.   Pulses:     Intact distal pulses.   Skin:     General: Skin is warm and dry.   Neurological:      Mental Status: Alert, oriented to person, place, and time and oriented to person, place and time.       Results Review:   Lab Results (last 72 hours)     Procedure Component Value Units Date/Time    POC Glucose Once [[id number]]  (Abnormal) Collected: 04/02/23 1230    Specimen: Blood Updated: 04/02/23 1241     Glucose 293 mg/dL      Comment: : [id number] Felipe [employer] water systemsherMeter ID: [id number]       POC Glucose Once [[id number]]  (Abnormal) Collected: 04/02/23 1126    Specimen: Blood Updated: 04/02/23 1147     Glucose 304 mg/dL      Comment: : [id number] Felipe HeatherMeter ID: [id number]       BNP [[id number]]  (Abnormal) Collected: 04/02/23 0613    Specimen: Blood Updated: 04/02/23 1123     proBNP 10,168.0 pg/mL     Narrative:      Among patients with dyspnea, NT-proBNP is highly sensitive for the detection of acute congestive heart failure. In addition NT-proBNP of <300 pg/ml effectively rules out acute congestive heart failure with 99% negative predictive value.    Results may be falsely decreased if patient taking Biotin.      Urine Culture - Urine, Urine, Clean Catch [[id number]]  (Normal) Collected: 03/31/23 2346    Specimen: Urine, Clean Catch Updated: 04/02/23 1051     Urine Culture No growth    D-dimer, Quantitative [[id number]]  (Normal) Collected: 04/02/23 1020    Specimen: Blood Updated: 04/02/23 1043     D-Dimer, Quantitative 0.66 MCGFEU/mL     Narrative:      According to the assay 's published package insert, a normal (<0.50 MCGFEU/mL) D-dimer result in conjunction with a non-high clinical probability assessment, excludes deep vein thrombosis (DVT) and pulmonary embolism (PE) with high sensitivity.    D-dimer values increase with age and this can make VTE exclusion of an older population difficult. To address this, the [employer] College of  "Physicians, based on best available evidence and recent guidelines, recommends that clinicians use age-adjusted D-dimer thresholds in patients greater than 50 years of age with: a) a low probability of PE who do not meet all Pulmonary Embolism Rule Out Criteria, or b) in those with intermediate probability of PE.   The formula for an age-adjusted D-dimer cut-off is \"age/100\".  For example, a 60 year old patient would have an age-adjusted cut-off of 0.60 MCGFEU/mL and an 80 year old 0.80 MCGFEU/mL.    POC Glucose Once [[id number]]  (Abnormal) Collected: 04/02/23 1026    Specimen: Blood Updated: 04/02/23 1037     Glucose 291 mg/dL      Comment: : [id number] sentit[employer] NetworksherMeter ID: [id number]       Wound Culture - Aspirate, Forehead [[id number]] Collected: 04/01/23 1120    Specimen: Aspirate from Forehead Updated: 04/02/23 0832     Wound Culture Culture in progress     Gram Stain Many (4+) WBCs seen      No organisms seen    POC Glucose Once [[id number]]  (Abnormal) Collected: 04/02/23 0757    Specimen: Blood Updated: 04/02/23 0808     Glucose 307 mg/dL      Comment: : [id number] Felipe Neo[employer]herMeter ID: [id number]       Culture, CSF - Cerebrospinal Fluid, Lumbar Puncture [[id number]] Collected: 03/31/23 1450    Specimen: Cerebrospinal Fluid from Lumbar Puncture Updated: 04/02/23 0721     CSF Culture No growth     Gram Stain No No organisms seen      Occasional WBCs seen    Basic Metabolic Panel [[id number]]  (Abnormal) Collected: 04/02/23 0613    Specimen: Blood Updated: 04/02/23 0641     Glucose 331 mg/dL      BUN 39 mg/dL      Creatinine 1.17 mg/dL      Sodium 135 mmol/L      Potassium 4.4 mmol/L      Comment: Slight hemolysis detected by analyzer. Results may be affected.        Chloride 99 mmol/L      CO2 15.0 mmol/L      Calcium 8.3 mg/dL      BUN/Creatinine Ratio 33.3     Anion Gap 21.0 mmol/L      eGFR 68.3 mL/min/1.73     Narrative:      GFR Normal >60  Chronic Kidney Disease <60  Kidney Failure <15      CBC " (No Diff) [[id number]]  (Abnormal) Collected: 04/02/23 0613    Specimen: Blood Updated: 04/02/23 0624     WBC 17.92 10*3/mm3      RBC 3.95 10*6/mm3      Hemoglobin 11.9 g/dL      Hematocrit 36.6 %      MCV 92.7 fL      MCH 30.1 pg      MCHC 32.5 g/dL      RDW 12.2 %      RDW-SD 41.3 fl      MPV 11.7 fL      Platelets 192 10*3/mm3     STAT Lactic Acid, Reflex [[id number]]  (Abnormal) Collected: 04/01/23 2340    Specimen: Blood Updated: 04/02/23 0031     Lactate 2.1 mmol/L     POC Glucose Once [[id number]]  (Abnormal) Collected: 04/01/23 2117    Specimen: Blood Updated: 04/01/23 2128     Glucose 188 mg/dL      Comment: : [id number] Hiram Estrellaeter ID: [id number]       Blood Gas, Arterial - [[id number]]  (Abnormal) Collected: 04/01/23 2054    Specimen: Arterial Blood Updated: 04/01/23 2053     Site Right Brachial     Marek's Test N/A     pH, Arterial 7.449 pH units      pCO2, Arterial 30.7 mm Hg      Comment: 84 Value below reference range        pO2, Arterial 52.9 mm Hg      Comment: 85 Value below critical limit        HCO3, Arterial 21.3 mmol/L      Base Excess, Arterial -1.8 mmol/L      Comment: 84 Value below reference range        O2 Saturation, Arterial 88.0 %      Comment: 84 Value below reference range        Temperature 37.0 C      Barometric Pressure for Blood Gas 752 mmHg      Modality Heated HFNC     FIO2 100 %      Flow Rate 40.0 lpm      Ventilator Mode NA     Notified Who HIRAM ZAPATA RN     Notified By [id number]     Notified Time 04/01/2023 20:55     Collected by [id number]     Comment: Meter: [id number]     :  [id number]        pCO2, Temperature Corrected 30.7 mm Hg      pH, Temp Corrected 7.449 pH Units      pO2, Temperature Corrected 52.9 mm Hg     STAT Lactic Acid, Reflex [[id number]]  (Abnormal) Collected: 04/01/23 1809    Specimen: Blood Updated: 04/01/23 1838     Lactate 2.9 mmol/L     POC Glucose Once [[id number]]  (Abnormal) Collected: 04/01/23 1657    Specimen: Blood Updated: 04/01/23  1708     Glucose 247 mg/dL      Comment: : [id number] Erica Andersoneter ID: [id number]       Blood Culture - Blood, Arm, Right [[id number]]  (Normal) Collected: 03/31/23 1251    Specimen: Blood from Arm, Right Updated: 04/01/23 1330     Blood Culture No growth at 24 hours    Blood Culture - Blood, Arm, Left [[id number]]  (Normal) Collected: 03/31/23 1249    Specimen: Blood from Arm, Left Updated: 04/01/23 1330     Blood Culture No growth at 24 hours    AFB Culture - Cerebrospinal Fluid, Lumbar Puncture [[id number]] Collected: 03/31/23 1450    Specimen: Cerebrospinal Fluid from Lumbar Puncture Updated: 04/01/23 1320     AFB Stain No acid fast bacilli seen on direct smear    STAT Lactic Acid, Reflex [[id number]]  (Abnormal) Collected: 04/01/23 1220    Specimen: Blood Updated: 04/01/23 1248     Lactate 2.4 mmol/L     POC Glucose Once [[id number]]  (Abnormal) Collected: 04/01/23 1147    Specimen: Blood Updated: 04/01/23 1157     Glucose 268 mg/dL      Comment: : [id number] Felipe HeatherMeter ID: [id number]       STAT Lactic Acid, Reflex [[id number]]  (Abnormal) Collected: 04/01/23 0918    Specimen: Blood Updated: 04/01/23 0959     Lactate 2.7 mmol/L     High Sensitivity Troponin T [[id number]]  (Abnormal) Collected: 04/01/23 0804    Specimen: Blood Updated: 04/01/23 0902     HS Troponin T 409 ng/L     Narrative:      High Sensitive Troponin T Reference Range:  <10.0 ng/L- Negative Female for AMI  <15.0 ng/L- Negative Male for AMI  >=10 - Abnormal Female indicating possible myocardial injury.  >=15 - Abnormal Male indicating possible myocardial injury.   Clinicians would have to utilize clinical acumen, EKG, Troponin, and serial changes to determine if it is an Acute Myocardial Infarction or myocardial injury due to an underlying chronic condition.         STAT Lactic Acid, Reflex [[id number]]  (Abnormal) Collected: 04/01/23 0804    Specimen: Blood Updated: 04/01/23 0902     Lactate 3.3 mmol/L     POC Glucose Once  [[id number]]  (Abnormal) Collected: 04/01/23 0828    Specimen: Blood Updated: 04/01/23 0839     Glucose 304 mg/dL      Comment: : [id number] Felipe HeatherMeter ID: [id number]       Lactic Acid, Plasma [[id number]]  (Abnormal) Collected: 04/01/23 0559    Specimen: Blood Updated: 04/01/23 0637     Lactate 2.6 mmol/L     High Sensitivity Troponin T 2Hr [[id number]]  (Abnormal) Collected: 04/01/23 0559    Specimen: Blood Updated: 04/01/23 0636     HS Troponin T 350 ng/L      Troponin T Delta 45 ng/L     Narrative:      High Sensitive Troponin T Reference Range:  <10.0 ng/L- Negative Female for AMI  <15.0 ng/L- Negative Male for AMI  >=10 - Abnormal Female indicating possible myocardial injury.  >=15 - Abnormal Male indicating possible myocardial injury.   Clinicians would have to utilize clinical acumen, EKG, Troponin, and serial changes to determine if it is an Acute Myocardial Infarction or myocardial injury due to an underlying chronic condition.         Basic Metabolic Panel [[id number]]  (Abnormal) Collected: 04/01/23 0559    Specimen: Blood Updated: 04/01/23 0636     Glucose 281 mg/dL      BUN 35 mg/dL      Creatinine 1.26 mg/dL      Sodium 136 mmol/L      Potassium 4.5 mmol/L      Chloride 97 mmol/L      CO2 19.0 mmol/L      Calcium 9.0 mg/dL      BUN/Creatinine Ratio 27.8     Anion Gap 20.0 mmol/L      eGFR 62.5 mL/min/1.73     Narrative:      GFR Normal >60  Chronic Kidney Disease <60  Kidney Failure <15      Blood Gas, Arterial - [[id number]]  (Abnormal) Collected: 04/01/23 0549    Specimen: Arterial Blood Updated: 04/01/23 0547     Site Right Radial     Marek's Test Positive     pH, Arterial 7.341 pH units      Comment: 84 Value below reference range        pCO2, Arterial 32.0 mm Hg      Comment: 84 Value below reference range        pO2, Arterial 95.4 mm Hg      HCO3, Arterial 17.3 mmol/L      Comment: 84 Value below reference range        Base Excess, Arterial -7.4 mmol/L      Comment: 84 Value below  reference range        O2 Saturation, Arterial 97.1 %      Temperature 37.0 C      Barometric Pressure for Blood Gas 741 mmHg      Modality Heated HFNC     FIO2 100 %      Flow Rate 40.0 lpm      Ventilator Mode NA     Collected by [id number]     Comment: Meter: [id number]     :  [id number]        pCO2, Temperature Corrected 32.0 mm Hg      pH, Temp Corrected 7.341 pH Units      pO2, Temperature Corrected 95.4 mm Hg     Comprehensive Metabolic Panel [[id number]]  (Abnormal) Collected: 04/01/23 0435    Specimen: Blood Updated: 04/01/23 0521     Glucose 261 mg/dL      BUN 34 mg/dL      Creatinine 1.20 mg/dL      Sodium 137 mmol/L      Potassium 3.9 mmol/L      Chloride 99 mmol/L      CO2 16.0 mmol/L      Calcium 8.7 mg/dL      Total Protein 7.4 g/dL      Albumin 3.5 g/dL      ALT (SGPT) 11 U/L      AST (SGOT) 28 U/L      Alkaline Phosphatase 111 U/L      Total Bilirubin 1.2 mg/dL      Globulin 3.9 gm/dL      A/G Ratio 0.9 g/dL      BUN/Creatinine Ratio 28.3     Anion Gap 22.0 mmol/L      eGFR 66.3 mL/min/1.73     Narrative:      GFR Normal >60  Chronic Kidney Disease <60  Kidney Failure <15      High Sensitivity Troponin T [[id number]]  (Abnormal) Collected: 04/01/23 0435    Specimen: Blood Updated: 04/01/23 0520     HS Troponin T 305 ng/L     Narrative:      High Sensitive Troponin T Reference Range:  <10.0 ng/L- Negative Female for AMI  <15.0 ng/L- Negative Male for AMI  >=10 - Abnormal Female indicating possible myocardial injury.  >=15 - Abnormal Male indicating possible myocardial injury.   Clinicians would have to utilize clinical acumen, EKG, Troponin, and serial changes to determine if it is an Acute Myocardial Infarction or myocardial injury due to an underlying chronic condition.         CBC Auto Differential [[id number]]  (Abnormal) Collected: 04/01/23 0435    Specimen: Blood Updated: 04/01/23 0444     WBC 19.05 10*3/mm3      RBC 4.40 10*6/mm3      Hemoglobin 13.3 g/dL      Hematocrit 42.2 %      MCV  95.9 fL      MCH 30.2 pg      MCHC 31.5 g/dL      RDW 12.1 %      RDW-SD 42.8 fl      MPV 11.4 fL      Platelets 224 10*3/mm3      Neutrophil % 73.6 %      Lymphocyte % 13.5 %      Monocyte % 11.3 %      Eosinophil % 0.2 %      Basophil % 0.5 %      Immature Grans % 0.9 %      Neutrophils, Absolute 14.02 10*3/mm3      Lymphocytes, Absolute 2.58 10*3/mm3      Monocytes, Absolute 2.15 10*3/mm3      Eosinophils, Absolute 0.03 10*3/mm3      Basophils, Absolute 0.10 10*3/mm3      Immature Grans, Absolute 0.17 10*3/mm3      nRBC 0.0 /100 WBC     Blood Gas, Arterial - [[id number]]  (Abnormal) Collected: 04/01/23 0406    Specimen: Arterial Blood Updated: 04/01/23 0407     Site Right Brachial     Marek's Test N/A     pH, Arterial 7.372 pH units      pCO2, Arterial 30.4 mm Hg      Comment: 84 Value below reference range        pO2, Arterial 50.3 mm Hg      Comment: 85 Value below critical limit        HCO3, Arterial 17.6 mmol/L      Comment: 84 Value below reference range        Base Excess, Arterial -6.4 mmol/L      Comment: 84 Value below reference range        O2 Saturation, Arterial 83.4 %      Comment: 84 Value below reference range        Temperature 37.0 C      Barometric Pressure for Blood Gas 741 mmHg      Modality Nasal Cannula     Flow Rate 4.5 lpm      Ventilator Mode NA     Notified [facility] [id number]     Notified By [id number]     Notified Time 04/01/2023 04:07     Collected by [id number]     Comment: Meter: [id number]     :  [id number]        pCO2, Temperature Corrected 30.4 mm Hg      pH, Temp Corrected 7.372 pH Units      pO2, Temperature Corrected 50.3 mm Hg     POC Glucose Once [[id number]]  (Abnormal) Collected: 04/01/23 0340    Specimen: Blood Updated: 04/01/23 0351     Glucose 234 mg/dL      Comment: : [id number] Valeria Chao ID: [id number]       Urinalysis With Culture If Indicated - Urine, Clean Catch [[id number]]  (Abnormal) Collected: 03/31/23 2346    Specimen: Urine, Clean Catch Updated: 04/01/23  0042     Color, UA Caroline     Appearance, UA Cloudy     pH, UA <=5.0     Specific Gravity, UA >1.030     Glucose,  mg/dL (Trace)     Ketones, UA Negative     Bilirubin, UA Small (1+)     Blood, UA Negative     Protein,  mg/dL (2+)     Leuk Esterase, UA Small (1+)     Nitrite, UA Positive     Urobilinogen, UA 2.0 E.U./dL    Narrative:      Dipstick results may be inaccurate due to color interference.    Urinalysis, Microscopic Only - Urine, Clean Catch [[id number]]  (Abnormal) Collected: 03/31/23 2346    Specimen: Urine, Clean Catch Updated: 04/01/23 0042     RBC, UA 3-5 /HPF      WBC, UA Too Numerous to Count /HPF      Bacteria, UA 1+ /HPF      Squamous Epithelial Cells, UA 7-12 /HPF      Yeast, UA Small/1+ Yeast /HPF      Hyaline Casts, UA 7-12 /LPF      Granular Casts, UA 3-6 /LPF      Methodology Manual Light Microscopy    POC Glucose Once [[id number]]  (Abnormal) Collected: 03/31/23 1703    Specimen: Blood Updated: 03/31/23 1718     Glucose 206 mg/dL      Comment: : [id number] Richard Reinoso ID: [id number]       Cell Count With Differential, CSF Use CSF Tube: 1 [[id number]]  (Abnormal) Collected: 03/31/23 1450    Specimen: Cerebrospinal Fluid from Lumbar Puncture Updated: 03/31/23 9406    Narrative:      The following orders were created for panel order Cell Count With Differential, CSF Use CSF Tube: 1.  Procedure                               Abnormality         Status                     ---------                               -----------         ------                     Cell Count, CSF - Cerebr...[[id number]]  Abnormal            Final result                 Please view results for these tests on the individual orders.    Cell Count, CSF - Cerebrospinal Fluid, Lumbar Puncture [[id number]]  (Abnormal) Collected: 03/31/23 1450    Specimen: Cerebrospinal Fluid from Lumbar Puncture Updated: 03/31/23 1556     Color, CSF Colorless     Supernatant Color, CSF Colorless     Appearance, CSF Clear      RBC, CSF 18 /mm3      Nucleated Cells, CSF 3 /mm3      Volume, CSF 26.0 mL      Tube Number, CSF 1     Method: Hemacytometer Method    Narrative:      Differential not indicated.    Protein, CSF - Cerebrospinal Fluid, Lumbar Puncture [[id number]]  (Abnormal) Collected: 03/31/23 1450    Specimen: Cerebrospinal Fluid from Lumbar Puncture Updated: 03/31/23 1554     Protein, Total (CSF) 90.9 mg/dL     Glucose, CSF - Cerebrospinal Fluid, Lumbar Puncture [[id number]]  (Abnormal) Collected: 03/31/23 1450    Specimen: Cerebrospinal Fluid from Lumbar Puncture Updated: 03/31/23 1554     Glucose,  mg/dL     Sedimentation Rate [[id number]]  (Abnormal) Collected: 03/31/23 1248    Specimen: Blood Updated: 03/31/23 1335     Sed Rate 60 mm/hr     Protime-INR [[id number]]  (Abnormal) Collected: 03/31/23 1249    Specimen: Blood Updated: 03/31/23 1332     Protime 14.9 Seconds      INR 1.16    aPTT [[id number]]  (Normal) Collected: 03/31/23 1249    Specimen: Blood Updated: 03/31/23 1332     PTT 34.3 seconds     POC Glucose Once [[id number]]  (Abnormal) Collected: 03/31/23 1257    Specimen: Blood Updated: 03/31/23 1309     Glucose 272 mg/dL      Comment: : [id number] Sheridan AshleyMeter ID: [id number]             ECGs reviewed, my interpretation:   (7/11/2022): Atrial fibrillation, RVR, nonspecific ST/T wave depression, LVH  (9/26/2022): Sinus bradycardia  (4/1/2023, at 431): Similar to the 7/11/2022 1 from above, atrial fibrillation with RVR (140 bpm), nonspecific ST/T wave abnormalities, poor R wave progression, borderline voltage criteria for LVH    CXRs personally visualized, my interpretation:   -3/31/2023 (PA/lateral): No significant pulmonary edema.  -4/1/23 (AP): Very mild increased interstitial markings consistent with very mild pulmonary edema    Telemetry reviewed, my interpretations:  -Upon arrival to the ICU on 4/1, was in atrial fibrillation.  Average rates initially were in the 80s but then trended up last evening  around 6:00 to the 100s and ultimately peaked with average rate in the 120s around 8 PM last night.  Rate gradually trended down thereafter and then at around 11:50 PM he went into an atrial flutter with 2: 1 AV conduction, resulting in ventricular rate of about 150 bpm for the most part maintaining between 11:50 PM and around 12:20 AM.  At that point, he converted back from flutter to atrial fibrillation and rates have gradually decreased, today ranging mostly in the 90s-low 100s.      Results for orders placed during the hospital encounter of 03/31/23    Adult Transthoracic Echo Complete W/ Cont if Necessary Per Protocol    Interpretation Summary  •  Left ventricular systolic function is mildly decreased. Left ventricular ejection fraction appears to be 46 - 50%.  •  Left ventricular wall thickness is consistent with mild to moderate concentric hypertrophy.  •  The following left ventricular wall segments are hypokinetic: apical anterior, apical lateral, apical inferior, apical septal, apex hypokinetic and mid anteroseptal.  •  Left atrial volume is mildly increased.  •  Estimated right ventricular systolic pressure from tricuspid regurgitation is mildly elevated (35-45 mmHg).  •  Normal size and function the right ventricle.  •  No significant (greater than mild) valvular pathology.  •  Mild dilation of the aortic root is present.  •  Compared to prior exam from 6/19/2022, both studies were technically difficult, but upon my independent review of the previous exam, there did appear to be some mild apical hypokinesis present on that study that does look slightly more prominent on current exam.      Assessment & Plan         1.  Acute hypoxic respiratory failure: No obvious cardiac etiology.  Patient is not volume overloaded and had not reported worsening volume/swelling or increasing weight prior to admission.  Rather, he reported a rather abrupt onset of severe shortness of breath on the day of admission, and  "noted that he had been having a mildly productive cough for about 5 weeks and stopping smoking but that had changed on the day before admission, to a cough productive of a more \"thick, green\" sputum according to the patient.    -Review of chest x-rays does not invoke concern for significant enough pulmonary edema to produce this degree of hypoxia  -Normal D-dimer essentially excludes PE.  -Discussed the above with the consulting physician Dr. Singh, who will ask pulmonary for further guidance    2.  Atrial fibrillation with rapid ventricular response: Exact history regarding his initial diagnosis of atrial fibrillation is not clear, though patient reports he was diagnosed several years ago [place].  He says he is never seen a cardiologist as an outpatient, though has been on sotalol prior to history of recent admissions here.  -Reviewing all of his ECGs, everyone has shown atrial fibrillation except for 1 on 9/26/2022, which revealed sinus bradycardia.  Therefore at this point, I do not think we can label him as permanent atrial fibrillation, but more likely a longstanding persistent form at this point.  Regardless of the precise behavior of the arrhythmia, given his overall illness, his rapid ventricular response to atrial fibrillation is not surprising.  Careful review of telemetry reveals that he is actually not that poorly controlled from a rate standpoint, though he is on low-dose Cardizem infusion.  His highest consistent ventricular rates were in the 120 range last night from 6 to 8 PM, aside from a bout of atrial flutter that he had around midnight with expected rates of 150 bpm.  Today, on Cardizem 5, his rates are in the 90s-low 100s.  -He is asymptomatic currently with regards to his A-fib/RVR  -Therefore, so long as he is taking oral medications, can convert from IV Cardizem to 60 mg p.o. every 6 hours  -Is currently anticoagulated, though was not so prior to admission given prior neurosurgical " "interventions  Patient has been on sotalol 120 twice daily at home (this had been discontinued previously during hospital stay in June 2022 for prolonged QTc), as well as Lopressor 50 twice daily.  He has not been anticoagulated, but on aspirin 81 mg daily.      3.  Elevated troponin: patient's chart lists coronary artery disease as a problem, and some notes even previously mentioned that he had a stent to the right coronary artery.  However, patient tells me he has never had a heart catheterization.  He says the stent that was placed was to a vessel in his leg.  We did discuss the fact that peripheral arterial disease is an equivalent of coronary disease, and with his diabetes and prior smoking, certainly he is at high risk for CAD.  I suspect his CAD accounts for the wall motion abnormality seen on his echocardiogram, but he is not currently complaining of any ischemic symptoms (and has not been prior to admission).  -Therefore, the elevated troponin is best labeled as \"acute myocardial injury\" and not reflective of acute coronary syndrome/non-STEMI.  -Continue aspirin (though cannot hold if needed from neurosurgical perspective), atorvastatin  -Patient would benefit from an ischemic evaluation as an outpatient, that it would not affect his perioperative risk at this point and certainly does not need to be done prior to any other surgery    I discussed the patient's findings and my recommendations with patient, nursing staff, and Dr Francisco Samaniego MD  04/02/23  14:13 CDT        "

## 2023-04-02 NOTE — PROGRESS NOTES
"Pharmacy Dosing Service  Pharmacokinetics  Vancomycin Follow-up Evaluation    Assessment/Action/Plan:  Current Order: Vancomycin 1000 mg IVPB every 12 hours  Current end date:4/8/2023  Levels: Vancomycin trough ordered before dose due on 4/3/2023 at 0600  Additional antimicrobial agent(s): Zosyn    Continue Vancomycin 1000 mg iv q12h. Pharmacy will continue to follow daily and adjust dose accordingly.     Subjective:  Elijah Escobar is a 67 y.o. male currently on Vancomycin for the treatment of intracranial infection, day 2 of 7 of treatment.    AUC Model Data:  Regimen: 1000 mg IV every 12 hours.  Exposure target: AUC24 (range)400-600 mg/L.hr   AUC24,ss: 498 mg/L.hr  PAUC*: 72 %  Ctrough,ss: 17 mg/L  Pconc*: 35 %  Tox.: 13 %    Objective:  Ht: 177.8 cm (70\"); Wt: 119 kg (261 lb 4.8 oz)  Estimated Creatinine Clearance: 79.2 mL/min (by C-G formula based on SCr of 1.17 mg/dL).   Creatinine   Date Value Ref Range Status   04/02/2023 1.17 0.76 - 1.27 mg/dL Final   04/01/2023 1.26 0.76 - 1.27 mg/dL Final   04/01/2023 1.20 0.76 - 1.27 mg/dL Final   08/31/2022 0.80 0.60 - 1.30 mg/dL Final     Comment:     Serial Number: [id number]:  [id number]   10/21/2020 1.03 0.60 - 1.30 mg/dL Final   02/07/2020 1.00 0.60 - 1.30 mg/dL Final   02/07/2020 1.00 0.60 - 1.30 mg/dL Final      Lab Results   Component Value Date    WBC 17.92 (H) 04/02/2023    WBC 19.05 (H) 04/01/2023    WBC 15.32 (H) 03/31/2023         Lab Results   Component Value Date    VANCOTROUGH 10.70 10/05/2022       Culture Results:  Microbiology Results (last 10 days)       Procedure Component Value - Date/Time    Wound Culture - Aspirate, Forehead [[id number]] Collected: 04/01/23 1120    Lab Status: Preliminary result Specimen: Aspirate from Forehead Updated: 04/01/23 1234     Gram Stain Many (4+) WBCs seen      No organisms seen    AFB Culture - Cerebrospinal Fluid, Lumbar Puncture [[id number]] Collected: 03/31/23 1450    Lab Status: Preliminary result Specimen: " Cerebrospinal Fluid from Lumbar Puncture Updated: 04/01/23 1320     AFB Stain No acid fast bacilli seen on direct smear    Culture, CSF - Cerebrospinal Fluid, Lumbar Puncture [[id number]] Collected: 03/31/23 1450    Lab Status: Preliminary result Specimen: Cerebrospinal Fluid from Lumbar Puncture Updated: 04/02/23 0721     CSF Culture No growth     Gram Stain No No organisms seen      Occasional WBCs seen    Blood Culture - Blood, Arm, Right [[id number]]  (Normal) Collected: 03/31/23 1251    Lab Status: Preliminary result Specimen: Blood from Arm, Right Updated: 04/01/23 1330     Blood Culture No growth at 24 hours    Blood Culture - Blood, Arm, Left [[id number]]  (Normal) Collected: 03/31/23 1249    Lab Status: Preliminary result Specimen: Blood from Arm, Left Updated: 04/01/23 1330     Blood Culture No growth at 24 hours            Mk Stahl, PharmD   04/02/23 08:24 CDT

## 2023-04-02 NOTE — PROGRESS NOTES
[facility] Medicine Services  INPATIENT PROGRESS NOTE    Patient Name: Elijah Escobar  Date of Admission: 3/31/2023  Today's Date: 04/02/23  Length of Stay: 2  Primary Care Physician: Brianna Martinez APRN    Subjective   Chief Complaint: Shortness of breath  HPI     The patient has become increasingly short of breath with greater oxygen requirement than yesterday.  Currently the patient is on BiPAP with FiO2 0.80.  Stat D-dimer was obtained as well as stat BNP.  D-dimer was within normal limits virtually ruling out pulmonary embolus.  BNP was elevated at 10,168.  Patient was diuresed with Lasix IV 40 mg every 12 hours over the past 24 hours with very positive little urinary output noted.  Metolazone 5 mg x 1 dose will be given today.  I discussed the case with Dr. Portillo and with Dr. Samaniego.  Cardiology will be consulted for further evaluation and recommendations.  Culture of purulent material from the patient's area of swelling showed no organisms but 4+ WBCs.  CSF culture shows no growth.  Blood culture also shows no growth at 24 hours.  White blood cell count slightly improved to 17,900 with hemoglobin 11.9.  Sodium 135.  Lactate is improved to 2.1.  Echocardiogram shows ejection fraction reduced from previous echo to 46 to 50% with slight worsening in left ventricular apical hypokinesis.  I spoke with Dr. Portillo regarding delaying surgery until at least 4/4.  Rate is well controlled with Cardizem drip which will likely need to be continued into the postoperative period.  The patient remains anticoagulated with Lovenox.    Review of Systems   All pertinent negatives and positives are as above. All other systems have been reviewed and are negative unless otherwise stated.     Objective    Temp:  [98 °F (36.7 °C)-100.4 °F (38 °C)] 98.1 °F (36.7 °C)  Heart Rate:  [] 105  Resp:  [20-36] 22  BP: ()/() 130/74  Physical Exam  Constitutional:        Appearance: He is ill-appearing.      Interventions: Face mask in place.   HENT:      Head: Atraumatic.      Comments:  Comments: Right frontotemporal scalp swelling extending to the supraorbital region.     Right Ear: External ear normal.      Left Ear: External ear normal.      Nose: Nose normal.      Mouth/Throat:      Mouth: Mucous membranes are dry.      Pharynx: Oropharynx is clear.   Eyes:      General: No scleral icterus.     Conjunctiva/sclera: Conjunctivae normal.   Cardiovascular:      Rate and Rhythm: Regular rhythm. Tachycardia present.      Pulses: Normal pulses.      Heart sounds: Normal heart sounds.   Pulmonary:      Effort: Pulmonary effort is normal. No respiratory distress.      Breath sounds: Normal breath sounds.   Abdominal:      General: Abdomen is flat. Bowel sounds are normal.      Palpations: Abdomen is soft. There is no mass.      Tenderness: There is no abdominal tenderness.   Musculoskeletal:         General: Normal range of motion.      Right lower leg: Edema present.      Left lower leg: Edema present.   Skin:     General: Skin is warm and dry.   Neurological:      General: No focal deficit present.      Mental Status: He is alert and oriented to person, place, and time. Mental status is at baseline.   Psychiatric:         Mood and Affect: Mood normal.         Judgment: Judgment normal.             Results Review:  I have reviewed the labs, radiology results, and diagnostic studies.    Laboratory Data:   Results from last 7 days   Lab Units 04/02/23  0613 04/01/23 0435 03/31/23  1020   WBC 10*3/mm3 17.92* 19.05* 15.32*   HEMOGLOBIN g/dL 11.9* 13.3 13.5   HEMATOCRIT % 36.6* 42.2 40.8   PLATELETS 10*3/mm3 192 224 214        Results from last 7 days   Lab Units 04/02/23  0613 04/01/23  0559 04/01/23 0435 03/31/23  1020   SODIUM mmol/L 135* 136 137 138   POTASSIUM mmol/L 4.4 4.5 3.9 4.0   CHLORIDE mmol/L 99 97* 99 100   CO2 mmol/L 15.0* 19.0* 16.0* 23.0   BUN mg/dL 39* 35* 34* 22    CREATININE mg/dL 1.17 1.26 1.20 1.18   CALCIUM mg/dL 8.3* 9.0 8.7 8.9   BILIRUBIN mg/dL  --   --  1.2 1.0   ALK PHOS U/L  --   --  111 103   ALT (SGPT) U/L  --   --  11 11   AST (SGOT) U/L  --   --  28 30   GLUCOSE mg/dL 331* 281* 261* 243*       Culture Data:   Blood Culture   Date Value Ref Range Status   03/31/2023 No growth at 24 hours  Preliminary   03/31/2023 No growth at 24 hours  Preliminary     Urine Culture   Date Value Ref Range Status   03/31/2023 No growth  Final     Wound Culture   Date Value Ref Range Status   04/01/2023 Culture in progress  Preliminary       Radiology Data:   Imaging Results (Last 24 Hours)     ** No results found for the last 24 hours. **          I have reviewed the patient's current medications.     Assessment/Plan   Assessment  Active Hospital Problems    Diagnosis    • **Facial swelling    • Acute pulmonary edema due to A-fib RVR    • Acute respiratory failure with hypoxia    • Paroxysmal atrial fibrillation with rapid ventricular response    • Type 2 diabetes mellitus with hyperglycemia and peripheral neuropathy, with long-term current use of insulin (HCC)    • Coronary artery disease    • Meningioma s/p craniotomy        Treatment Plan  Zaroxolyn 5 mg p.o. x1 dose  Continue Lasix 40 mg IV every 12 hours  Cardiology consultation, case discussed with Dr. Samaniego  Continue to attempt to diurese  Stat D-dimer, done (negative)  Stat BNP, done (10,000)  Case discussed with Dr. Portillo electronically  Insulin drip for improved glycemic control  Will likely need to continue Cardizem drip for rate control into the postoperative period  Continue Lovenox anticoagulation    Medical Decision Making  Number and Complexity of problems:   1) respiratory failure with hypoxia secondary to pulmonary edema which is secondary to A-fib RVR, acute, moderate complexity  2) PAF with RVR, acute, moderate complexity  3) meningioma versus intracranial infection, acute, high complexity  4) type 2  diabetes, chronic, moderate complexity     Differential Diagnosis: Pulmonary embolus, LV dysfunction     Conditions and Status        Condition is unchanged.     [facility] Data  External documents reviewed: Care everywhere documentation  Cardiac tracing (EKG, telemetry) interpretation: See HPI  Radiology interpretation: See HPI  Labs reviewed: See HPI  Any tests that were considered but not ordered: None     Decision rules/scores evaluated (example [id number], [place], etc): [id number] score of 5 with a 7.2% risk of stroke annually     Discussed with: The patient and nursing     Care Planning  Shared decision making: The patient  Code status and discussions: Full code     Disposition  Social Determinants of Health that impact treatment or disposition: None  I expect the patient to be discharged by the primary service.     Electronically signed by Ramin Singh DO, 04/02/23, 12:45 CDT.    I spent 45 minutes providing critical care management to this patient. This excludes time spent in performing separately billed procedures.

## 2023-04-02 NOTE — PROGRESS NOTES
"Pharmacy Dosing Service  Antimicrobial Dosing  Zosyn    Assessment/Action/Plan:  Based on indication and renal function, Zosyn adjusted  to 4.5 gm IV every 8 hours (extended infusion) for sepsis; UTI. Pharmacy will continue to monitor daily and make further adjustment(s) accordingly.     Subjective:  Elijah Escobar is a 67 y.o. male initiated on Zosyn 3.375 gm IV every 6 hours, for the treatment of sepsis; UTI by prescriber, day 2 of 7 of treatment.    Objective:  Ht: 177.8 cm (70\"); Wt: 119 kg (261 lb 4.8 oz)  Estimated Creatinine Clearance: 79.2 mL/min (by C-G formula based on SCr of 1.17 mg/dL).   Creatinine   Date Value Ref Range Status   04/02/2023 1.17 0.76 - 1.27 mg/dL Final   04/01/2023 1.26 0.76 - 1.27 mg/dL Final   04/01/2023 1.20 0.76 - 1.27 mg/dL Final   08/31/2022 0.80 0.60 - 1.30 mg/dL Final     Comment:     Serial Number: [id number]:  [id number]   10/21/2020 1.03 0.60 - 1.30 mg/dL Final   02/07/2020 1.00 0.60 - 1.30 mg/dL Final   02/07/2020 1.00 0.60 - 1.30 mg/dL Final      Lab Results   Component Value Date    WBC 17.92 (H) 04/02/2023    WBC 19.05 (H) 04/01/2023    WBC 15.32 (H) 03/31/2023      Baseline culture results:  Microbiology Results (last 10 days)       Procedure Component Value - Date/Time    Wound Culture - Aspirate, Forehead [[id number]] Collected: 04/01/23 1120    Lab Status: Preliminary result Specimen: Aspirate from Forehead Updated: 04/02/23 0832     Wound Culture Culture in progress     Gram Stain Many (4+) WBCs seen      No organisms seen    AFB Culture - Cerebrospinal Fluid, Lumbar Puncture [[id number]] Collected: 03/31/23 1456    Lab Status: Preliminary result Specimen: Cerebrospinal Fluid from Lumbar Puncture Updated: 04/01/23 1320     AFB Stain No acid fast bacilli seen on direct smear    Culture, CSF - Cerebrospinal Fluid, Lumbar Puncture [[id number]] Collected: 03/31/23 1450    Lab Status: Preliminary result Specimen: Cerebrospinal Fluid from Lumbar Puncture Updated: 04/02/23 " 0721     CSF Culture No growth     Gram Stain No No organisms seen      Occasional WBCs seen    Blood Culture - Blood, Arm, Right [[id number]]  (Normal) Collected: 03/31/23 1251    Lab Status: Preliminary result Specimen: Blood from Arm, Right Updated: 04/01/23 1330     Blood Culture No growth at 24 hours    Blood Culture - Blood, Arm, Left [[id number]]  (Normal) Collected: 03/31/23 1249    Lab Status: Preliminary result Specimen: Blood from Arm, Left Updated: 04/01/23 1330     Blood Culture No growth at 24 hours            Mk Stahl, PharmD  04/02/23 08:37 CDT

## 2023-04-02 NOTE — PLAN OF CARE
Goal Outcome Evaluation:  Plan of Care Reviewed With: patient        Progress: improving  Outcome Evaluation: Resting quietly @ present. Cardiology and Pulmonology consulted today and both have rounded on pt. MRSA PCR and Covid swabs both negative today. Pt. has been tachypneic much of this shift but is improving @ present. States he tolerates the bipap better than the vapotherm. C/O feeling he is SOA when on vapotherm. Solumedrol and duonebs ordered per pulmonology. Started on Insulin gtt and blood glucose improving. UA ordered and pending collection at this time. Pt. continues to have decreased urine output today. Cardizem gtt off at this time per Cardiology request and po Cardizem given as ordered. Safety maintained. Continue to monitor.

## 2023-04-03 NOTE — PLAN OF CARE
Goal Outcome Evaluation:  Plan of Care Reviewed With: patient        Progress: improving  Outcome Evaluation: Pt was in bed.  Transfered supine to sitting with CGA.  Transfered sit to stand with CGA.  Amb 200' with straight cane and CGA. on 2L throughout tx.

## 2023-04-03 NOTE — THERAPY TREATMENT NOTE
Patient Name: Elijah Escobar  : 1955    MRN: [id number]                              Today's Date: 4/3/2023       Admit Date: 3/31/2023    Visit Dx:     ICD-10-CM ICD-9-CM   1. Impaired mobility  Z74.09 799.89     Patient Active Problem List   Diagnosis   • Meningioma s/p craniotomy   • Hypertension   • GERD (gastroesophageal reflux disease)   • Coronary artery disease   • Class 2 severe obesity due to excess calories with serious comorbidity and body mass index (BMI) of 38.0 to 38.9 in adult   • Type 2 diabetes mellitus with hyperglycemia and peripheral neuropathy, with long-term current use of insulin (HCC)   • Leukocytosis   • Other specified anemias   • Paroxysmal atrial fibrillation with rapid ventricular response   • Post-operative infection   • S/P craniotomy   • Smoker   • History of cranioplasty   • Facial edema   • Facial swelling   • Acute pulmonary edema due to A-fib RVR   • Acute respiratory failure with hypoxia   • Myocardial injury     Past Medical History:   Diagnosis Date   • Brain tumor    • Coronary artery disease    • COVID-19 vaccine series completed     MODERNA X 3; LAST DOSE 3/2022   • Diabetes    • Erectile dysfunction    • GERD (gastroesophageal reflux disease)    • Hypercholesteremia    • Hypertension    • Seizure      Past Surgical History:   Procedure Laterality Date   • BALLOON ANGIOPLASTY, ARTERY Right    • COLONOSCOPY     • CRANIECTOMY Right 2022    Procedure: CRANIECTOMY WITH INCISIONAL WASHOUT;  Surgeon: Felipe Banerjee MD;  Location:  [facility];  Service: Neurosurgery;  Laterality: Right;   • CRANIOPLASTY Right 10/4/2022    Procedure: CRANIOPLASTY right with Lumbar drain;  Surgeon: Flaco Portillo MD;  Location:  [facility];  Service: Neurosurgery;  Laterality: Right;   • CRANIOTOMY FOR TUMOR Right 2022    Procedure: CRANIOTOMY FOR TUMOR STERIOTACTIC WITH BRAIN LAB, right;  Surgeon: Flaco Portillo MD;  Location:  [facility];  Service: Neurosurgery;   Laterality: Right;      General Information     Row Name 04/03/23 0720          OT Time and Intention    Document Type therapy note (daily note)  -     Mode of Treatment occupational therapy  -     Row Name 04/03/23 0720          General Information    Patient Profile Reviewed yes  -     Existing Precautions/Restrictions oxygen therapy device and L/min;fall  -     Barriers to Rehab medically complex  -     Row Name 04/03/23 0720          Cognition    Orientation Status (Cognition) oriented x 4  -     Row Name 04/03/23 0720          Safety Issues, Functional Mobility    Impairments Affecting Function (Mobility) shortness of breath;endurance/activity tolerance  -           User Key  (r) = Recorded By, (t) = Taken By, (c) = Cosigned By    Initials Name Provider Type     Coleen Andres, ERONR/L, CSRS Occupational Therapist                 Mobility/ADL's     Row Name 04/03/23 0720          Bed Mobility    Comment, (Bed Mobility) up in chair  -     Row Name 04/03/23 0720          Transfers    Transfers sit-stand transfer;stand-sit transfer  -     Comment, (Transfers) completed x12 reps in total - worked on endurance training. O2 sats maintained in low to mid 90s with all activity.  -     Row Name 04/03/23 0720          Sit-Stand Transfer    Sit-Stand [place] (Transfers) verbal cues;standby assist;supervision  -     Row Name 04/03/23 0720          Stand-Sit Transfer    Stand-Sit [place] (Transfers) standby assist;contact guard  -     Row Name 04/03/23 0720          Activities of Daily Living    BADL Assessment/Intervention lower body dressing  -     Row Name 04/03/23 0720          Lower Body Dressing Assessment/Training    [place] Level (Lower Body Dressing) don;socks;moderate assist (50% patient effort)  -     Position (Lower Body Dressing) supported sitting  -           User Key  (r) = Recorded By, (t) = Taken By, (c) = Cosigned By    Initials Name Provider Type      Coleen Andres OTR/L, CSRS Occupational Therapist               Obj/Interventions     Row Name 04/03/23 0720          Strength Comprehensive (MMT)    Comment, General Manual Muscle Testing (MMT) Assessment Pt provided with and completed B UE strengthening HEP with red theraband to increase UE strength and endurance in preperation of higher level adl independence.  -TITI     Row Name 04/03/23 0720          Motor Skills    Motor Skills functional endurance  -     Functional Endurance 6 reps x 2 sets of sit <> stand t/f from chair - worked on endurance training. O2 sats maintained in low to mid 90s with all activity.  -           User Key  (r) = Recorded By, (t) = Taken By, (c) = Cosigned By    Initials Name Provider Type    Coleen Martinez OTR/L, ALEISHA Occupational Therapist               Goals/Plan    No documentation.                Clinical Impression     Row Name 04/03/23 0720          Pain Assessment    Pretreatment Pain Rating 0/10 - no pain  -     Posttreatment Pain Rating 0/10 - no pain  -     Row Name 04/03/23 0720          Plan of Care Review    Plan of Care Reviewed With patient  -     Outcome Evaluation OT tx completed. Pt presents alert, sitting up in chair, agreeable to therapy this am. He reports feeling better this am and is ready to work with therapy. Worked on increasing UE strength and endurance for improved independence with all adls. He worked through 15 reps x 1 set of UE strengthening HEP with red theraband. Completed 6 reps x 2 sets of sit <> stand t/f with O2 sats maintained in mid to low 90s with all activity on 2L O2/NC. He was left sitting in chair at end of session. Possible plan for cranial flap removal later this date. Continue OT POC.  -     Row Name 04/03/23 0720          Therapy Plan Review/Discharge Plan (OT)    Anticipated Discharge Disposition (OT) home with assist  -     Row Name 04/03/23 0720          Positioning and Restraints    Pre-Treatment Position  sitting in chair/recliner  -JJ     Post Treatment Position chair  -JJ     In Chair notified nsg;call light within reach;encouraged to call for assist;reclined;legs elevated;patient within staff view  -           User Key  (r) = Recorded By, (t) = Taken By, (c) = Cosigned By    Initials Name Provider Type    Coleen Martinez, PETER/L, ZULMAS Occupational Therapist               Outcome Measures     Row Name 04/03/23 0720          How much help from another is currently needed...    Putting on and taking off regular lower body clothing? 2  -JJ     Bathing (including washing, rinsing, and drying) 2  -JJ     Toileting (which includes using toilet bed pan or urinal) 3  -JJ     Putting on and taking off regular upper body clothing 3  -JJ     Taking care of personal grooming (such as brushing teeth) 4  -JJ     Eating meals 4  -     AM-PAC 6 Clicks Score (OT) 18  -     Row Name 04/03/23 0739          How much help from another person do you currently need...    Turning from your back to your side while in flat bed without using bedrails? 4  -SC     Moving from lying on back to sitting on the side of a flat bed without bedrails? 4  -SC     Moving to and from a bed to a chair (including a wheelchair)? 3  -SC     Standing up from a chair using your arms (e.g., wheelchair, bedside chair)? 3  -SC     Climbing 3-5 steps with a railing? 3  -SC     To walk in hospital room? 3  -SC     AM-PAC 6 Clicks Score (PT) 20  -SC     Highest level of mobility 6 --> Walked 10 steps or more  -SC     Row Name 04/03/23 0720          Functional Assessment    Outcome Measure Options AM-PAC 6 Clicks Daily Activity (OT)  -           User Key  (r) = Recorded By, (t) = Taken By, (c) = Cosigned By    Initials Name Provider Type    Rowena Back RN Registered Nurse    Coleen Martinez OTR/L, ALEISHA Occupational Therapist                Occupational Therapy Education     Title: PT OT SLP Therapies (In Progress)     Topic: Occupational  Therapy (Done)     Point: ADL training (Done)     Description:   Instruct learner(s) on proper safety adaptation and remediation techniques during self care or transfers.   Instruct in proper use of assistive devices.              Learning Progress Summary           Patient Acceptance, E, VU by TITI at 4/3/2023 0811    Acceptance, E, VU,NR by GABRIELA at 4/1/2023 1445                   Point: Home exercise program (Done)     Description:   Instruct learner(s) on appropriate technique for monitoring, assisting and/or progressing therapeutic exercises/activities.              Learning Progress Summary           Patient Acceptance, E, VU,NR by GABRIELA at 4/1/2023 1445                   Point: Precautions (Done)     Description:   Instruct learner(s) on prescribed precautions during self-care and functional transfers.              Learning Progress Summary           Patient Acceptance, E, VU by MENDEL at 4/3/2023 0811    Acceptance, E, VU,NR by GABRIELA at 4/1/2023 1445                   Point: Body mechanics (Done)     Description:   Instruct learner(s) on proper positioning and spine alignment during self-care, functional mobility activities and/or exercises.              Learning Progress Summary           Patient Acceptance, E, VU by MENDEL at 4/3/2023 0811    Acceptance, E, VU,NR by GABRIELA at 4/1/2023 1445                               User Key     Initials Effective Dates Name Provider Type Discipline     02/03/23 -  Coretta Siddiqi, OTR/L, CNT Occupational Therapist OT    TITI 11/10/21 -  Coleen Andres OTR/L, CSRS Occupational Therapist OT              OT Recommendation and Plan     Plan of Care Review  Plan of Care Reviewed With: patient  Outcome Evaluation: OT tx completed. Pt presents alert, sitting up in chair, agreeable to therapy this am. He reports feeling better this am and is ready to work with therapy. Worked on increasing UE strength and endurance for improved independence with all adls. He worked through 15 reps x 1 set of  UE strengthening HEP with red theraband. Completed 6 reps x 2 sets of sit <> stand t/f with O2 sats maintained in mid to low 90s with all activity on 2L O2/NC. He was left sitting in chair at end of session. Possible plan for cranial flap removal later this date. Continue OT POC.     Time Calculation:    Time Calculation- OT     Row Name 04/03/23 0720             Time Calculation- OT    OT Start Time 0720  -      OT Stop Time 0800  -      OT Time Calculation (min) 40 min  -      Total Timed Code Minutes- OT 40 minute(s)  -      OT Received On 04/03/23  -            User Key  (r) = Recorded By, (t) = Taken By, (c) = Cosigned By    Initials Name Provider Type    Coleen Martinez OTR/L, CSRS Occupational Therapist              Therapy Charges for Today     Code Description Service Date Service Provider Modifiers Qty    [id number]  OT THERAPEUTIC ACT EA 15 MIN 4/3/2023 Coleen Andres OTR/L, CSRS GO 1    [id number]  OT THER PROC EA 15 MIN 4/3/2023 Coleen Andres OTR/L, CSRS GO 2               PETER [place]/L, CSRS  4/3/2023

## 2023-04-03 NOTE — PROGRESS NOTES
RT EQUIPMENT DEVICE RELATED - SKIN ASSESSMENT    RT Medical Equipment/Device:     NIV Mask:  Full-face    size: large    Skin Assessment:      Cheek:  Intact  Nose:  Intact    Device Skin Pressure Protection:  Skin-to-device areas padded:  Hydrocolloid nasal strips on bridge of nose    Nurse Notification:  Kylie Condon, CRT

## 2023-04-03 NOTE — PROGRESS NOTES
[facility] Medicine Services  INPATIENT PROGRESS NOTE    Patient Name: Elijah Escobar  Date of Admission: 3/31/2023  Today's Date: 04/03/23  Length of Stay: 3  Primary Care Physician: Brianna Martinez APRN    Subjective   Chief Complaint: Shortness of breath  HPI     The patient is awake, alert and very talkative.  He was cheerful and anxious to proceed with surgical intervention.  The patient's dyspnea is significantly better.  He has been titrated to 2 L per nasal cannula with good maintenance of oxygen saturations.  He will be transition from IV Lasix to oral Lasix at a lower dose.  I discussed the case with Dr. Portillo today and the patient is cleared for neurosurgical intervention tomorrow.  Culture of forehead aspirate shows heavy growth of gram-negative bacilli.  He remains on broad-spectrum IV antibiotics with vancomycin and Zosyn pending finalization of cultures.  Heart rate has improved to 103.  Blood pressure stable.  Other vital signs stable.  The patient has been afebrile over the past 24 hours.    Review of Systems   All pertinent negatives and positives are as above. All other systems have been reviewed and are negative unless otherwise stated.     Objective    Temp:  [97.2 °F (36.2 °C)-97.9 °F (36.6 °C)] 97.9 °F (36.6 °C)  Heart Rate:  [] 103  Resp:  [18-27] 22  BP: ()/() 102/61  Physical Exam  Constitutional:       Appearance: He is ill-appearing.      Interventions: Face mask in place.   HENT:      Head: Atraumatic.      Comments:  Comments: Right frontotemporal scalp swelling extending to the supraorbital region.     Right Ear: External ear normal.      Left Ear: External ear normal.      Nose: Nose normal.      Mouth: Mucous membranes are moist.      Pharynx: Oropharynx is clear.   Eyes:      General: No scleral icterus.     Conjunctiva/sclera: Conjunctivae normal.   Cardiovascular:      Rate and Rhythm: Regular rhythm. Tachycardia  present.      Pulses: Normal pulses.      Heart sounds: Normal heart sounds.   Pulmonary:      Effort: Pulmonary effort is normal. No respiratory distress.      Breath sounds: Normal breath sounds.   Abdominal:      General: Abdomen is flat. Bowel sounds are normal.      Palpations: Abdomen is soft. There is no mass.      Tenderness: There is no abdominal tenderness.   Musculoskeletal:         General: Normal range of motion.      Right lower leg: Edema  improved significantly.     Left lower leg: Edema  improved significantly.  Skin:     General: Skin is warm and dry.   Neurological:      General: No focal deficit present.      Mental Status: He is alert and oriented to person, place, and time. Mental status is at baseline.   Psychiatric:         Mood and Affect: Mood normal.         Judgment: Judgment normal.     Results Review:  I have reviewed the labs, radiology results, and diagnostic studies.    Laboratory Data:   Results from last 7 days   Lab Units 04/03/23  0436 04/02/23  0613 04/01/23 0435   WBC 10*3/mm3 11.00* 17.92* 19.05*   HEMOGLOBIN g/dL 11.0* 11.9* 13.3   HEMATOCRIT % 34.5* 36.6* 42.2   PLATELETS 10*3/mm3 188 192 224        Results from last 7 days   Lab Units 04/03/23  0436 04/02/23  0613 04/01/23  0559 04/01/23  0435 03/31/23  1020   SODIUM mmol/L 139 135* 136 137 138   POTASSIUM mmol/L 3.6 4.4 4.5 3.9 4.0   CHLORIDE mmol/L 102 99 97* 99 100   CO2 mmol/L 23.0 15.0* 19.0* 16.0* 23.0   BUN mg/dL 36* 39* 35* 34* 22   CREATININE mg/dL 1.16 1.17 1.26 1.20 1.18   CALCIUM mg/dL 8.6 8.3* 9.0 8.7 8.9   BILIRUBIN mg/dL 0.7  --   --  1.2 1.0   ALK PHOS U/L 89  --   --  111 103   ALT (SGPT) U/L 11  --   --  11 11   AST (SGOT) U/L 22  --   --  28 30   GLUCOSE mg/dL 102* 331* 281* 261* 243*       Culture Data:   Blood Culture   Date Value Ref Range Status   03/31/2023 No growth at 3 days  Preliminary   03/31/2023 No growth at 3 days  Preliminary     Urine Culture   Date Value Ref Range Status   03/31/2023 No  growth  Final     Wound Culture   Date Value Ref Range Status   04/01/2023 Heavy growth (4+) Gram Negative Bacilli (A)  Preliminary       Radiology Data:   Imaging Results (Last 24 Hours)     Procedure Component Value Units Date/Time    CT Chest Without Contrast Diagnostic [[id number]] Collected: 04/03/23 0723     Updated: 04/03/23 0738    Narrative:      EXAMINATION: CT CHEST WO CONTRAST DIAGNOSTIC-      4/3/2023 5:46 AM CDT     HISTORY: COPD, surveillance; Z74.09-Other reduced mobility     In order to have a CT radiation dose as low as reasonably achievable  Automated Exposure Control was utilized for adjustment of the mA and/or  KV according to patient size.     DLP in mGycm= 339     The CT scan of the chest is performed without intravenous contrast  enhancement.     The images are acquired in axial plane and subsequent reconstruction in  coronal and sagittal planes.     Comparison is made with the previous study dated 06/24/2022.     The lungs are poorly expanded with significant respiratory motion  artifacts.     There are groundglass opacities/infiltrate in the right upper lobe  anterior segment which were not seen in the previous study.     There is mild-to-moderate pulmonary vascular congestion. This is  moderate to more progressive since the previous study and partly may be  due to poor lung expansion.     There is a small bibasal pleural effusion which was not noted in the  previous study.     There is a small pleural-based nodule in the right upper lobe  anteriorly, image #58 in series 4, measuring 5 mm in greatest dimension.  This is unchanged. A focal pleural thickening/pleural based nodule is  seen in the left lower lobe anterolaterally, image #126 in series 4  which is also similar to the previous study. It measures 6 mm at the  base. No change.     The central airways patent. No endobronchial abnormality or nodule.     The limited visualized soft tissues of the neck are unremarkable. There  is  moderate lobulated fullness of the thyroid gland. No discrete nodule  is identified. However due to absence of contrast enhancement and  isodense nodule may not be excluded.     No axillary lymphadenopathy.     Severe atheromatous changes of the abdominal aorta is seen. No  aneurysmal dilatation.     Severe atheromatous changes of coronary arteries.     There is significant dilatation of central pulmonary arteries suggesting  pulmonary arterial hypertension.     Atheromatous changes of the coronary arteries.     Limited visualized unenhanced abdomen is unremarkable except for  lobulation/nodules in both adrenal glands, left more than the right.  Incompletely visualized significantly distended gallbladder seen. No  radiopaque stone in the visualized gallbladder. The pancreas and kidneys  are incompletely visualized and not evaluated.     Images reviewed in bone window show no acute bony abnormality or a bone  lesion.       Impression:      1. Groundglass opacity/nodule in the upper lobes may represent an  evolving infiltrate.  2. Pulmonary vascular congestion and small bibasal pleural effusion.  3. Pulmonary arterial hypertension.  4. Small pleural-based nodules in the lungs bilaterally are stable since  the previous study and probably represent chronic inflammatory process.  No features to suggest lung malignancy.           This report was finalized on 04/03/2023 07:35 by Dr. Michelle Vigil MD.          I have reviewed the patient's current medications.     Assessment/Plan   Assessment  Active Hospital Problems    Diagnosis    • **Facial swelling    • Myocardial injury    • Acute pulmonary edema due to A-fib RVR    • Acute respiratory failure with hypoxia    • Paroxysmal atrial fibrillation with rapid ventricular response    • Type 2 diabetes mellitus with hyperglycemia and peripheral neuropathy, with long-term current use of insulin (HCC)    • Coronary artery disease    • Meningioma s/p craniotomy        Treatment  Plan  Discontinue IV Lasix  Lasix 20 mg p.o. every morning  Change every 6 hours Cardizem to Cardizem CD  Cleared for neurosurgical intervention tomorrow  Continue PT  Continue IV steroids per pulmonology    Medical Decision Making  Number and Complexity of problems:   1) respiratory failure with hypoxia secondary to pulmonary edema which is secondary to A-fib RVR, acute, moderate complexity  2) PAF with RVR, acute, moderate complexity  3) meningioma versus intracranial infection, acute, high complexity  4) type 2 diabetes, chronic, moderate complexity     Differential Diagnosis: Pulmonary embolus, LV dysfunction     Conditions and Status        Condition is unchanged.     [facility] Data  External documents reviewed: Care everywhere documentation  Cardiac tracing (EKG, telemetry) interpretation: See HPI  Radiology interpretation: See HPI  Labs reviewed: See HPI  Any tests that were considered but not ordered: None     Decision rules/scores evaluated (example [id number], [place], etc): [id number] score of 5 with a 7.2% risk of stroke annually     Discussed with: The patient and nursing     Care Planning  Shared decision making: The patient  Code status and discussions: Full code     Disposition  Social Determinants of Health that impact treatment or disposition: None  I expect the patient to be discharged by the primary service.     Electronically signed by Ramin Singh DO, 04/03/23, 16:41 CDT.

## 2023-04-03 NOTE — PROGRESS NOTES
LOS: 3 days   Patient Care Team:  Brianna Martinez APRN as PCP - General (Nurse Practitioner)  Rahul Arnold APRN as Nurse Practitioner (Nurse Practitioner)  Flaco Portillo MD as Surgeon (Neurosurgery)    Chief Complaint: Cardiology consulted for rapid atrial fibrillation     Subjective    Elijah Escobar is a 67 y.o. male who is being seen in follow-up  Overnight feeling better  No chest pain  No palpitation  No presyncope  No syncope  No orthopnea  No paroxysmal nocturnal dyspnea  Hemodynamically stable  No bleeding issues  No falls  Under care of neurosurgery initial excision of brain tumor followed by cellulitis  In need for flap repair  Dr. Jaxon Samaniego saw this patient yesterday    Telemetry: Atrial fibrillation with improved rate control with rates between  bpm.  No malignant arrhythmia.  No severe bradycardia or pauses    Review of Systems   Constitutional: No chills   Has fatigue   No fever.   HENT: Negative.    Eyes: Negative.    Respiratory: Negative for cough,   No chest wall soreness,   Shortness of breath,   no wheezing, no stridor.    Cardiovascular: As above  Gastrointestinal: Negative for abdominal distention,  No abdominal pain,   No blood in stool,   No constipation,   No diarrhea,   No nausea   No vomiting.   Endocrine: Negative.    Genitourinary: Negative for difficulty urinating, dysuria, flank pain and hematuria.   Musculoskeletal: Negative.    Skin: Negative for rash and wound.   Allergic/Immunologic: Negative.    Neurological: Negative for dizziness, syncope, weakness,   No light-headedness  No  headaches.   Hematological: Does not bruise/bleed easily.   Psychiatric/Behavioral: Negative for agitation or behavioral problems,   No confusion,   the patient is  nervous/anxious.       History:   Past Medical History:   Diagnosis Date   • Brain tumor    • Coronary artery disease    • COVID-19 vaccine series completed     MODERNA X 3; LAST DOSE 3/2022   • Diabetes    • Erectile  dysfunction    • GERD (gastroesophageal reflux disease)    • Hypercholesteremia    • Hypertension    • Seizure      Past Surgical History:   Procedure Laterality Date   • BALLOON ANGIOPLASTY, ARTERY Right    • COLONOSCOPY     • CRANIECTOMY Right 2022    Procedure: CRANIECTOMY WITH INCISIONAL WASHOUT;  Surgeon: Felipe Banerjee MD;  Location: [facility] OR;  Service: Neurosurgery;  Laterality: Right;   • CRANIOPLASTY Right 10/4/2022    Procedure: CRANIOPLASTY right with Lumbar drain;  Surgeon: Flaco Portillo MD;  Location:  [facility] OR;  Service: Neurosurgery;  Laterality: Right;   • CRANIOTOMY FOR TUMOR Right 2022    Procedure: CRANIOTOMY FOR TUMOR STERIOTACTIC WITH BRAIN LAB, right;  Surgeon: Flaco Portillo MD;  Location:  [facility] OR;  Service: Neurosurgery;  Laterality: Right;     Social History     Socioeconomic History   • Marital status:    Tobacco Use   • Smoking status: Former     Packs/day: 0.25     Years: 15.00     Pack years: 3.75     Types: Cigarettes     Quit date: 3/15/2023     Years since quittin.0   • Smokeless tobacco: Never   Vaping Use   • Vaping Use: Never used   Substance and Sexual Activity   • Alcohol use: Never   • Drug use: Never   • Sexual activity: Defer     Family History   Problem Relation Age of Onset   • Cancer Mother         liver   • Heart disease Father        Labs:  WBC WBC   Date Value Ref Range Status   2023 11.00 (H) 3.40 - 10.80 10*3/mm3 Final   2023 17.92 (H) 3.40 - 10.80 10*3/mm3 Final   2023 19.05 (H) 3.40 - 10.80 10*3/mm3 Final   2023 15.32 (H) 3.40 - 10.80 10*3/mm3 Final      HGB Hemoglobin   Date Value Ref Range Status   2023 11.0 (L) 13.0 - 17.7 g/dL Final   2023 11.9 (L) 13.0 - 17.7 g/dL Final   2023 13.3 13.0 - 17.7 g/dL Final   2023 13.5 13.0 - 17.7 g/dL Final      HCT Hematocrit   Date Value Ref Range Status   2023 34.5 (L) 37.5 - 51.0 % Final   2023 36.6 (L) 37.5 - 51.0 % Final    04/01/2023 42.2 37.5 - 51.0 % Final   03/31/2023 40.8 37.5 - 51.0 % Final      Platelets Platelets   Date Value Ref Range Status   04/03/2023 188 140 - 450 10*3/mm3 Final   04/02/2023 192 140 - 450 10*3/mm3 Final   04/01/2023 224 140 - 450 10*3/mm3 Final   03/31/2023 214 140 - 450 10*3/mm3 Final      MCV MCV   Date Value Ref Range Status   04/03/2023 95.0 79.0 - 97.0 fL Final   04/02/2023 92.7 79.0 - 97.0 fL Final   04/01/2023 95.9 79.0 - 97.0 fL Final   03/31/2023 94.0 79.0 - 97.0 fL Final        Results from last 7 days   Lab Units 04/03/23  0436 04/02/23  0613 04/01/23  0559 04/01/23  0435 03/31/23  1020   SODIUM mmol/L 139 135* 136 137 138   POTASSIUM mmol/L 3.6 4.4 4.5 3.9 4.0   CHLORIDE mmol/L 102 99 97* 99 100   CO2 mmol/L 23.0 15.0* 19.0* 16.0* 23.0   BUN mg/dL 36* 39* 35* 34* 22   CREATININE mg/dL 1.16 1.17 1.26 1.20 1.18   CALCIUM mg/dL 8.6 8.3* 9.0 8.7 8.9   BILIRUBIN mg/dL 0.7  --   --  1.2 1.0   ALK PHOS U/L 89  --   --  111 103   ALT (SGPT) U/L 11  --   --  11 11   AST (SGOT) U/L 22  --   --  28 30   GLUCOSE mg/dL 102* 331* 281* 261* 243*     Lab Results   Component Value Date    TROPONINT 409 (C) 04/01/2023     PT/INR:    Protime   Date Value Ref Range Status   03/31/2023 14.9 (H) 11.8 - 14.8 Seconds Final   /  INR   Date Value Ref Range Status   03/31/2023 1.16 (H) 0.91 - 1.09 Final       Imaging Results (Last 72 Hours)     Procedure Component Value Units Date/Time    CT Chest Without Contrast Diagnostic [[id number]] Collected: 04/03/23 0723     Updated: 04/03/23 0738    Narrative:      EXAMINATION: CT CHEST WO CONTRAST DIAGNOSTIC-      4/3/2023 5:46 AM CDT     HISTORY: COPD, surveillance; Z74.09-Other reduced mobility     In order to have a CT radiation dose as low as reasonably achievable  Automated Exposure Control was utilized for adjustment of the mA and/or  KV according to patient size.     DLP in mGycm= 339     The CT scan of the chest is performed without intravenous contrast  enhancement.      The images are acquired in axial plane and subsequent reconstruction in  coronal and sagittal planes.     Comparison is made with the previous study dated 06/24/2022.     The lungs are poorly expanded with significant respiratory motion  artifacts.     There are groundglass opacities/infiltrate in the right upper lobe  anterior segment which were not seen in the previous study.     There is mild-to-moderate pulmonary vascular congestion. This is  moderate to more progressive since the previous study and partly may be  due to poor lung expansion.     There is a small bibasal pleural effusion which was not noted in the  previous study.     There is a small pleural-based nodule in the right upper lobe  anteriorly, image #58 in series 4, measuring 5 mm in greatest dimension.  This is unchanged. A focal pleural thickening/pleural based nodule is  seen in the left lower lobe anterolaterally, image #126 in series 4  which is also similar to the previous study. It measures 6 mm at the  base. No change.     The central airways patent. No endobronchial abnormality or nodule.     The limited visualized soft tissues of the neck are unremarkable. There  is moderate lobulated fullness of the thyroid gland. No discrete nodule  is identified. However due to absence of contrast enhancement and  isodense nodule may not be excluded.     No axillary lymphadenopathy.     Severe atheromatous changes of the abdominal aorta is seen. No  aneurysmal dilatation.     Severe atheromatous changes of coronary arteries.     There is significant dilatation of central pulmonary arteries suggesting  pulmonary arterial hypertension.     Atheromatous changes of the coronary arteries.     Limited visualized unenhanced abdomen is unremarkable except for  lobulation/nodules in both adrenal glands, left more than the right.  Incompletely visualized significantly distended gallbladder seen. No  radiopaque stone in the visualized gallbladder. The pancreas  and kidneys  are incompletely visualized and not evaluated.     Images reviewed in bone window show no acute bony abnormality or a bone  lesion.       Impression:      1. Groundglass opacity/nodule in the upper lobes may represent an  evolving infiltrate.  2. Pulmonary vascular congestion and small bibasal pleural effusion.  3. Pulmonary arterial hypertension.  4. Small pleural-based nodules in the lungs bilaterally are stable since  the previous study and probably represent chronic inflammatory process.  No features to suggest lung malignancy.           This report was finalized on 04/03/2023 07:35 by Dr. Michelle Vigil MD.    XR Chest 1 View [[id number]] Collected: 04/01/23 0636     Updated: 04/01/23 0641    Narrative:      EXAM/TECHNIQUE: XR CHEST 1 VW-     INDICATION: respiratory distress     COMPARISON: 03/31/2023     FINDINGS:     Cardiac silhouette is within normal limits and stable. No pleural  effusion or visible pneumothorax. Central vascular congestion with  diffuse interstitial opacity, similar to prior exam. No acute osseous  finding.       Impression:         No change in appearance of the chest. Persistent central vascular  congestion with mild diffuse interstitial opacity, which may be related  to mild volume overload/pulmonary edema.  This report was finalized on 04/01/2023 06:38 by Dr. Mk Chinchilla MD.    MRI Brain Without Contrast [[id number]] Collected: 03/31/23 1708     Updated: 03/31/23 1723    Narrative:      HISTORY: Pseudomeningocele, 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 Patient refuses additional imaging. No IV contrast  administered as patient unable to tolerate exam     COMPARISON: CT head 03/31/2023     FINDINGS: Postoperative changes from right frontal cranioplasty. There  is fluid attenuated signal seen superficial to the cranioplasty flap  within the scalp, increased from the postoperative  10/04/2022 CT head  exam, concerning for pseudomeningocele. There is questionable subdural  hemorrhage seen deep to the cranioplasty flap, however T2 gradient  sequences are not obtained. There is increased FLAIR signal within the  underlying right frontal lobe parenchyma. There is no midline shifting.  There is mild generalized volume loss. No intra-axial mass is  identified.       Impression:      1. Limited exam. Patient is able to tolerate only axial diffusion  weighted, FLAIR, T1 sequences with a single sagittal T1 sequence. No  additional imaging or contrast administered due to patient unable to  tolerate study.  2. Right frontal cranioplasty changes. Increasing fluid collection  superficial to the cranioplasty flap within the scalp concerning for  pseudomeningocele. Questionable small subdural hemorrhage deep to the  cranioplasty site. Mild hyperintense FLAIR signal noted within the  underlying right frontal lobe parenchyma.  This report was finalized on 03/31/2023 17:20 by Dr. Coleen Terry MD.    CT Head Without Contrast [[id number]] Collected: 03/31/23 1540     Updated: 03/31/23 1547    Narrative:      EXAMINATION: CT HEAD WO CONTRAST-      3/31/2023 3:16 PM CDT     HISTORY: Pseudomeningocele concern for postcraniotomy infection     In order to have a CT radiation dose as low as reasonably achievable  Automated Exposure Control was utilized for adjustment of the mA and/or  KV according to patient size.     DLP in mGycm= 413.     Right frontal craniectomy.     Large right frontal scalp fluid collection measuring approximately 8 cm  in diameter and 1.8 cm in thickness.  There is a 5 mm rim of low density fluid deep to the synthetic catheter  placed at the right frontal craniectomy site.  Deep to this rim of fluid is a 7 mm thick rim of acute hemorrhage.     There is no significant mass effect.  Mild right frontal white matter edema.     Normal ventricle size.  No parenchymal hemorrhage or acute infarct.  No  midline shift.     Summary:  1. Postsurgical changes with right frontal scalp fluid collection and a  small amount of right frontal ventricular cranial hemorrhage. No midline  shift or large intracranial hematoma.                                   This report was finalized on 03/31/2023 15:44 by Dr. Ghulam Cano MD.    IR LUMBAR PUNCTURE DIAGNOSTIC [[id number]] Collected: 03/31/23 1528     Updated: 03/31/23 1535    Narrative:      EXAMINATION: IR LUMBAR PUNCTURE DIAGNOSTIC-     3/31/2023 2:33 PM CDT     HISTORY: Pseudomeningocele concern for postoperative intracranial  infection     The procedure was explained to the patient. The benefits, and the risk  and complications were discussed. The patient understood the procedure  and signed the consent.     The patient was positioned on the fluoroscopy table in prone position  and lumbar spine was examined. A suitable spot opposite space L1-2 was  selected for puncture. The spot was marked with an ink marker.     After sterile preparation and application of local anesthesia a size  20-gauge spinal needle was introduced into the thecal sac and a free  flow of clear colorless thin CSF was established. The outer hub of the  spinal needle was attached to a pressure measuring device and opening  pressure was measured. The opening pressure is 23 cm water. Subsequently  27 mL of CSF was removed and and displaced and 4 separate test tubes  which were sent to the laboratory for further evaluation as instructed  by the attending physician. The needle was then withdrawn after  replacing the stylus. The puncture site was covered with a sterile  Band-Aid.     Patient tolerated the procedure well. No immediate complications were  noted.     The postprocedure care instructions were given to the patient.       Impression:      1. A successful lumbar puncture and opening and closing pressure  measurements.  2. Fluoroscopy time: 1 minute 14 seconds.  3. The dose: 101mGy.  4. Number of  images: 1  This report was finalized on 03/31/2023 15:32 by Dr. Michelle Vigil MD.    XR Chest PA & Lateral [[id number]] Collected: 03/31/23 1452     Updated: 03/31/23 1458    Narrative:      HISTORY: Dyspnea     CXR: 2 views the chest are obtained.     COMPARISON: 09/26/2022     FINDINGS: There is an increasing prominence of the interstitial markings  diffusely. Anterior upper lobe consolidation on the lateral view is not  identified of the frontal exam and most likely secondary to soft tissue  artifact/attenuation. The heart is upper limits of normal in size. No  pleural effusion or pneumothorax identified. No acute regional bony  pathology. Chronic calcifications adjacent to the bilateral shoulders.       Impression:      1. Increasing interstitial densities may relate to mild edema/volume  overload. Pneumonitis considered. Soft tissue attenuation/artifact  suspected on the lateral view.  This report was finalized on 03/31/2023 14:55 by Dr. Coleen Terry MD.          Objective     No Known Allergies    Medication Review: Performed  Current Facility-Administered Medications   Medication Dose Route Frequency Provider Last Rate Last Admin   • acetaminophen (TYLENOL) tablet 650 mg  650 mg Oral Q4H PRN Rahul Arnold APRN   650 mg at 04/02/23 0002    Or   • acetaminophen (TYLENOL) 160 MG/5ML solution 650 mg  650 mg Oral Q4H PRN Rahul Arnold APRN        Or   • acetaminophen (TYLENOL) suppository 650 mg  650 mg Rectal Q4H PRN Rahul Arnold APRN       • aspirin chewable tablet 81 mg  81 mg Oral Daily Rahul Arnold APRN   81 mg at 04/02/23 0805   • atorvastatin (LIPITOR) tablet 20 mg  20 mg Oral Nightly Rahul Arnold APRN   20 mg at 04/02/23 2001   • budesonide-formoterol (SYMBICORT) 160-4.5 MCG/ACT inhaler 2 puff  2 puff Inhalation BID - RT Tracy Gupta MD   2 puff at 04/03/23 0656   • butalbital-acetaminophen-caffeine (FIORICET, ESGIC) -40 MG per tablet 1 tablet  1 tablet Oral Q6H PRN Rust, Rahul M,  APRN   1 tablet at 04/03/23 0611   • Chlorhexidine Gluconate Cloth 2 % pads 1 application  1 application Topical Q24H Flaco Portillo MD   1 application at 04/03/23 0611   • dextrose (D50W) (25 g/50 mL) IV injection 10-50 mL  10-50 mL Intravenous Q15 Min PRN Ramin Singh DO       • dextrose (GLUTOSE) oral gel 15 g  15 g Oral Q15 Min PRN Ramin Singh DO       • dilTIAZem (CARDIZEM) 125 mg in 125 mL NS infusion  5-15 mg/hr Intravenous Titrated Efra Hector DO   Stopped at 04/02/23 1600   • dilTIAZem (CARDIZEM) tablet 60 mg  60 mg Oral Q6H Jaxon Samaniego MD   60 mg at 04/03/23 0611   • Enoxaparin Sodium (LOVENOX) syringe 120 mg  1 mg/kg Subcutaneous Q12H Ramin Singh DO   120 mg at 04/02/23 2002   • furosemide (LASIX) injection 40 mg  40 mg Intravenous Q12H Ramin Singh DO   40 mg at 04/02/23 2153   • gabapentin (NEURONTIN) capsule 300 mg  300 mg Oral TID Rahul Arnold APRN   300 mg at 04/02/23 2001   • gadobenate dimeglumine (MULTIHANCE) injection 20 mL  20 mL Intravenous Once in imaging Flaco Portillo MD       • glucagon (human recombinant) (GLUCAGEN DIAGNOSTIC) injection 1 mg  1 mg Intramuscular Q15 Min PRN Ramin Singh DO       • HYDROmorphone (DILAUDID) injection 0.5 mg  0.5 mg Intravenous Q2H PRN Efra Hector DO   0.5 mg at 04/01/23 1543   • insulin regular (HumuLIN R,NovoLIN R) 100 Units in sodium chloride 0.9 % 100 mL (1 Units/mL) infusion  0-100 Units/hr Intravenous Titrated Ramin Singh DO 29.8 mL/hr at 04/03/23 0803 29.8 Units/hr at 04/03/23 0803   • ipratropium (ATROVENT) nebulizer solution 0.5 mg  0.5 mg Nebulization 4x Daily - RT Alia Gonsalves APRN       • levETIRAcetam (KEPPRA) tablet 500 mg  500 mg Oral BID Rahul Arnold APRN   500 mg at 04/02/23 2001   • methylPREDNISolone sodium succinate (SOLU-Medrol) injection 40 mg  40 mg Intravenous Q12H Alia Gonsalves APRN       • metoprolol tartrate (LOPRESSOR) tablet 50 mg   50 mg Oral Q12H Rahul Arnold APRN   50 mg at 04/02/23 2001   • mupirocin (BACTROBAN) 2 % nasal ointment 1 application  1 application Each Nare BID Flaco Portillo MD   1 application at 04/02/23 2001   • nicotine (NICODERM CQ) 14 MG/24HR patch 1 patch  1 patch Transdermal Daily PRN Rahul Arnold APRN       • ondansetron (ZOFRAN) tablet 4 mg  4 mg Oral Q6H PRN Rahul Arnold APRN        Or   • ondansetron (ZOFRAN) injection 4 mg  4 mg Intravenous Q6H PRN FedericotRahul APRN       • oxyCODONE-acetaminophen (PERCOCET)  MG per tablet 1 tablet  1 tablet Oral Q4H PRN Rahul Arnold APRN   1 tablet at 04/01/23 1958   • oxyCODONE-acetaminophen (PERCOCET) 7.5-325 MG per tablet 1 tablet  1 tablet Oral Q4H PRN Rahul Arnold APRN   1 tablet at 04/02/23 2000   • pantoprazole (PROTONIX) EC tablet 40 mg  40 mg Oral Q AM RusRahul farooq APRN   40 mg at 04/03/23 0611   • Pharmacy to Dose enoxaparin (LOVENOX)   Does not apply Continuous PRN Ramin Singh DO       • piperacillin-tazobactam (ZOSYN) 4.5 g in iso-osmotic dextrose 100 mL IVPB (premix)  4.5 g Intravenous Q8H Ramin Singh DO   4.5 g at 04/02/23 2153   • polyethylene glycol (MIRALAX) packet 17 g  17 g Oral Daily Rahul Arnold APRN   17 g at 04/02/23 0805   • sennosides-docusate (PERICOLACE) 8.6-50 MG per tablet 1 tablet  1 tablet Oral Daily Rahul Arnold APRN   1 tablet at 04/02/23 0805   • sodium chloride 0.9 % flush 10 mL  10 mL Intravenous Q12H Rahul Arnold APRN   10 mL at 04/02/23 2001   • sodium chloride 0.9 % flush 10 mL  10 mL Intravenous PRN Rahul Arnold APRN       • sodium chloride 0.9 % flush 10 mL  10 mL Intravenous Q12H Ramin Singh DO   10 mL at 04/02/23 2001   • sodium chloride 0.9 % flush 10 mL  10 mL Intravenous PRN Ramin Singh DO       • sodium chloride 0.9 % infusion 40 mL  40 mL Intravenous PRN Rahul Arnold APRN       • sodium chloride 0.9 % infusion 40 mL  40 mL Intravenous PRN Ramin Singh,  "DO       • vancomycin 1250 mg/250 mL 0.9% NS IVPB ([facility])  1,250 mg Intravenous Q12H Ramin Singh,            Vital Sign Min/Max for last 24 hours  Temp  Min: 97.2 °F (36.2 °C)  Max: 98.1 °F (36.7 °C)   BP  Min: 90/77  Max: 136/118   Pulse  Min: 86  Max: 132   Resp  Min: 18  Max: 27   SpO2  Min: 90 %  Max: 100 %   No data recorded   No data recorded     Flowsheet Rows    Flowsheet Row First Filed Value   Admission Height 177.8 cm (70\") Documented at 04/01/2023 1617   Admission Weight 117 kg (259 lb) Documented at 04/01/2023 1617          Results for orders placed during the hospital encounter of 03/31/23    Adult Transthoracic Echo Complete W/ Cont if Necessary Per Protocol    Interpretation Summary  •  Left ventricular systolic function is mildly decreased. Left ventricular ejection fraction appears to be 46 - 50%.  •  Left ventricular wall thickness is consistent with mild to moderate concentric hypertrophy.  •  The following left ventricular wall segments are hypokinetic: apical anterior, apical lateral, apical inferior, apical septal, apex hypokinetic and mid anteroseptal.  •  Left atrial volume is mildly increased.  •  Estimated right ventricular systolic pressure from tricuspid regurgitation is mildly elevated (35-45 mmHg).  •  Normal size and function the right ventricle.  •  No significant (greater than mild) valvular pathology.  •  Mild dilation of the aortic root is present.  •  Compared to prior exam from 6/19/2022, both studies were technically difficult, but upon my independent review of the previous exam, there did appear to be some mild apical hypokinesis present on that study that does look slightly more prominent on current exam.      Physical Exam:    General Appearance: Awake, alert, in no acute distress  Eyes: Pupils equal and reactive    Ears: Appear intact with no abnormalities noted  Nose: Nares normal, no drainage  Neck: supple, trachea midline, no carotid bruit and no JVD  Back: no " kyphosis present,    Lungs: respirations regular, respirations even and respirations unlabored  Heart: normal S1, S2, irregular, 2/6 systolic murmur left sternal border  No gallops or rubs  no rub and no click  Abdomen: normal bowel sounds, no tenderness   Skin: no bleeding, bruising or rash  Extremities: no cyanosis  Psychiatric/Behavioral: Negative for agitation, behavioral problems, confusion, the patient does  appear to be nervous/anxious.       Results Review:   I reviewed the patient's new clinical results.  I reviewed the patient's new imaging results and agree with the interpretation.  I reviewed the patient's other test results and agree with the interpretation  I personally viewed and interpreted the patient's EKG/Telemetry data    Discussed with patient  Updated patient regarding any new or relevant abnormalities on review of records or any new findings on physical exam.   Mentioned to patient about purpose of visit and desirable health short and long term goals and objectives.     Reviewed available prior notes, consults, prior visits, laboratory findings, radiology and cardiology relevant reports.   Updated chart as applicable.   I have reviewed the patient's medical history in detail and updated the computerized patient record as relevant.          Assessment & Plan       Facial swelling    Meningioma s/p craniotomy    Coronary artery disease    Type 2 diabetes mellitus with hyperglycemia and peripheral neuropathy, with long-term current use of insulin (HCC)    Paroxysmal atrial fibrillation with rapid ventricular response    Acute pulmonary edema due to A-fib RVR    Acute respiratory failure with hypoxia    Myocardial injury      Plan    Discussed with patient as well as with nursing  Add digoxin 100 mcg p.o. daily  Continue on metoprolol as well as Cardizem  Can switch to long-acting Cardizem  Overall clinically improved  Anticoagulation as tolerated    Telemetry  Deep vein thrombosis  prophylaxis/precautions  Appropriate diet, fluid, sodium, caffeine, stimulants intake   Questions were encouraged, asked and answered to the patient's  understanding and satisfaction.  Compliance to diet and medications       Shubham Kc MD  04/03/23  08:10 CDT    EMR Dragon/Transcription was used to dictate part of this note M

## 2023-04-03 NOTE — PROGRESS NOTES
[facility] [place] [facility] [place] - [facility]    Patient: Elijah Escobar  1955   MR# [id number]   Acct# [id number]  04/03/23   07:03 CDT  Referring Provider: Flaco Portillo, *    Chief Complaint: Respiratory failure     Interval history: Afebrile. Saturation 94 on 2L.  Therapy at bedside.  He is practicing going from sitting to standing in the recliner.  He reports he is feeling much better.  He is anxious to get stronger and participate more therapy.  White count 11.0. Respiratory culture added. HR issues ongoing although improved.  Current pulse 109.  Bronchodilators have been adjusted.  CT results pending but showing faint right upper lobe and bibasilar lower lobe infiltrates.    Meds:  aspirin, 81 mg, Oral, Daily  atorvastatin, 20 mg, Oral, Nightly  budesonide-formoterol, 2 puff, Inhalation, BID - RT  Chlorhexidine Gluconate Cloth, 1 application, Topical, Q24H  dilTIAZem, 60 mg, Oral, Q6H  enoxaparin, 1 mg/kg, Subcutaneous, Q12H  furosemide, 40 mg, Intravenous, Q12H  gabapentin, 300 mg, Oral, TID  gadobenate dimeglumine, 20 mL, Intravenous, Once in imaging  ipratropium-albuterol, 3 mL, Nebulization, 4x Daily - RT  levETIRAcetam, 500 mg, Oral, BID  methylPREDNISolone sodium succinate, 60 mg, Intravenous, Q8H  metoprolol tartrate, 50 mg, Oral, Q12H  mupirocin, 1 application, Each Nare, BID  pantoprazole, 40 mg, Oral, Q AM  piperacillin-tazobactam, 4.5 g, Intravenous, Q8H  polyethylene glycol, 17 g, Oral, Daily  sennosides-docusate, 1 tablet, Oral, Daily  sodium chloride, 10 mL, Intravenous, Q12H  sodium chloride, 10 mL, Intravenous, Q12H  vancomycin, 1,000 mg, Intravenous, Q12H      dilTIAZem, 5-15 mg/hr, Last Rate: Stopped (04/02/23 1600)  insulin, 0-100 Units/hr, Last Rate: 16.1 Units/hr (04/03/23 0621)  Pharmacy to Dose enoxaparin (LOVENOX),         Physical Exam:  SpO2 Percentage    04/03/23 0200 04/03/23 0400 04/03/23 0647   SpO2: 95% 96% 91%     Body mass index  is 37.49 kg/m².   Temp:  [97.2 °F (36.2 °C)-98.7 °F (37.1 °C)] 97.6 °F (36.4 °C)  Heart Rate:  [] 132  Resp:  [18-27] 19  BP: ()/() 96/80    Intake/Output Summary (Last 24 hours) at 4/3/2023 0703  Last data filed at 4/3/2023 0246  Gross per 24 hour   Intake 920.04 ml   Output 750 ml   Net 170.04 ml     Physical Exam  Constitutional:       Appearance: Normal appearance. He is obese. He is ill-appearing. He is not toxic-appearing.      Interventions: Nasal cannula in place.   HENT:      Head: Normocephalic.      Right Ear: External ear normal.      Left Ear: External ear normal.      Nose: Nose normal.      Mouth/Throat:      Mouth: Mucous membranes are moist.   Eyes:      General:         Right eye: No discharge.         Left eye: No discharge.      Conjunctiva/sclera: Conjunctivae normal.      Pupils: Pupils are equal, round, and reactive to light.   Cardiovascular:      Rate and Rhythm: Tachycardia present. Rhythm irregular.      Heart sounds: No murmur heard.  Pulmonary:      Effort: Pulmonary effort is normal. No respiratory distress.      Breath sounds: No wheezing.   Abdominal:      General: Abdomen is flat. Bowel sounds are normal. There is no distension.      Palpations: Abdomen is soft. There is no mass.   Musculoskeletal:      Cervical back: Neck supple.      Right lower leg: No edema.      Left lower leg: No edema.   Skin:     General: Skin is warm and dry.      Coloration: Skin is not jaundiced or pale.   Neurological:      General: No focal deficit present.      Mental Status: He is alert and oriented to person, place, and time.      Comments: Generally weak   Psychiatric:         Mood and Affect: Mood normal.         Behavior: Behavior normal.         Thought Content: Thought content normal.         Judgment: Judgment normal.         Electronically signed by ADITYA Olivera, 4/3/2023, 07:03 CDT      Physician substantive portion: medical decision making  Result  Review  Laboratory Data:  Results from last 7 days   Lab Units 04/03/23  0436 04/02/23  0613 04/01/23  0435   WBC 10*3/mm3 11.00* 17.92* 19.05*   HEMOGLOBIN g/dL 11.0* 11.9* 13.3   PLATELETS 10*3/mm3 188 192 224     Results from last 7 days   Lab Units 04/03/23  0436 04/03/23  0040 04/02/23  0613 04/01/23  2340 04/01/23  0804 04/01/23  0559 04/01/23  0435 03/31/23  1249   SODIUM mmol/L 139  --  135*  --   --  136   < >  --    POTASSIUM mmol/L 3.6  --  4.4  --   --  4.5   < >  --    CO2 mmol/L 23.0  --  15.0*  --   --  19.0*   < >  --    BUN mg/dL 36*  --  39*  --   --  35*   < >  --    CREATININE mg/dL 1.16  --  1.17  --   --  1.26   < >  --    INR   --   --   --   --   --   --   --  1.16*   LACTATE mmol/L  --   --   --  2.1*   < > 2.6*  --   --    CRP mg/dL  --  21.26*  --   --   --   --   --   --     < > = values in this interval not displayed.     Results from last 7 days   Lab Units 04/03/23  0423 04/02/23  1455 04/01/23  2054   PH, ARTERIAL pH units 7.482* 7.503* 7.449   PCO2, ARTERIAL mm Hg 32.9* 28.3* 30.7*   PO2 ART mm Hg 85.7 52.2* 52.9*   FIO2 % 70 80 100     Microbiology Results (last 10 days)     Procedure Component Value - Date/Time    Legionella Antigen, Urine - Urine, Urine, Clean Catch [[id number]]  (Normal) Collected: 04/02/23 2148    Lab Status: Final result Specimen: Urine, Clean Catch Updated: 04/02/23 2213     LEGIONELLA ANTIGEN, URINE Negative    S. Pneumo Ag Urine or CSF - Urine, Urine, Clean Catch [[id number]]  (Normal) Collected: 04/02/23 2148    Lab Status: Final result Specimen: Urine, Clean Catch Updated: 04/02/23 2214     Strep Pneumo Ag Negative    Respiratory Panel PCR w/COVID-19(SARS-CoV-2) [facility] In-House, NP Swab in UTM/VTM, 3-4 HR TAT - Swab, Nasopharynx [[id number]]  (Normal) Collected: 04/02/23 1512    Lab Status: Final result Specimen: Swab from Nasopharynx Updated: 04/02/23 1608     ADENOVIRUS, PCR Not Detected     Coronavirus 229E Not Detected     Coronavirus  HKU1 Not Detected     Coronavirus NL63 Not Detected     Coronavirus OC43 Not Detected     COVID19 Not Detected     Human Metapneumovirus Not Detected     Human Rhinovirus/Enterovirus Not Detected     Influenza A PCR Not Detected     Influenza B PCR Not Detected     Parainfluenza Virus 1 Not Detected     Parainfluenza Virus 2 Not Detected     Parainfluenza Virus 3 Not Detected     Parainfluenza Virus 4 Not Detected     RSV, PCR Not Detected     Bordetella pertussis pcr Not Detected     Bordetella parapertussis PCR Not Detected     Chlamydophila pneumoniae PCR Not Detected     Mycoplasma pneumo by PCR Not Detected    Narrative:      In the setting of a positive respiratory panel with a viral infection PLUS a negative procalcitonin without other underlying concern for bacterial infection, consider observing off antibiotics or discontinuation of antibiotics and continue supportive care. If the respiratory panel is positive for atypical bacterial infection (Bordetella pertussis, Chlamydophila pneumoniae, or Mycoplasma pneumoniae), consider antibiotic de-escalation to target atypical bacterial infection.    MRSA Screen, PCR (Inpatient) - Swab, Nares [[id number]]  (Normal) Collected: 04/02/23 1512    Lab Status: Final result Specimen: Swab from Nares Updated: 04/02/23 1636     MRSA PCR No MRSA Detected    Narrative:      The negative predictive value of this diagnostic test is high and should only be used to consider de-escalating anti-MRSA therapy. A positive result may indicate colonization with MRSA and must be correlated clinically.    Wound Culture - Aspirate, Forehead [[id number]]  (Abnormal) Collected: 04/01/23 1120    Lab Status: Preliminary result Specimen: Aspirate from Forehead Updated: 04/03/23 0621     Wound Culture Heavy growth (4+) Gram Negative Bacilli     Gram Stain Many (4+) WBCs seen      No organisms seen    Urine Culture - Urine, Urine, Clean Catch [[id number]]  (Normal) Collected: 03/31/23 2346    Lab  Status: Final result Specimen: Urine, Clean Catch Updated: 04/02/23 1051     Urine Culture No growth    AFB Culture - Cerebrospinal Fluid, Lumbar Puncture [[id number]] Collected: 03/31/23 1450    Lab Status: Preliminary result Specimen: Cerebrospinal Fluid from Lumbar Puncture Updated: 04/01/23 1320     AFB Stain No acid fast bacilli seen on direct smear    Culture, CSF - Cerebrospinal Fluid, Lumbar Puncture [[id number]] Collected: 03/31/23 1450    Lab Status: Preliminary result Specimen: Cerebrospinal Fluid from Lumbar Puncture Updated: 04/03/23 0729     CSF Culture No growth at 2 days     Gram Stain No No organisms seen      Occasional WBCs seen    Blood Culture - Blood, Arm, Right [[id number]]  (Normal) Collected: 03/31/23 1251    Lab Status: Preliminary result Specimen: Blood from Arm, Right Updated: 04/03/23 1330     Blood Culture No growth at 3 days    Blood Culture - Blood, Arm, Left [[id number]]  (Normal) Collected: 03/31/23 1249    Lab Status: Preliminary result Specimen: Blood from Arm, Left Updated: 04/03/23 1330     Blood Culture No growth at 3 days         Recent films:  CT Chest Without Contrast Diagnostic    Result Date: 4/3/2023  EXAMINATION: CT CHEST WO CONTRAST DIAGNOSTIC-   4/3/2023 5:46 AM CDT  HISTORY: COPD, surveillance; Z74.09-Other reduced mobility  In order to have a CT radiation dose as low as reasonably achievable Automated Exposure Control was utilized for adjustment of the mA and/or KV according to patient size.  DLP in mGycm= 339  The CT scan of the chest is performed without intravenous contrast enhancement.  The images are acquired in axial plane and subsequent reconstruction in coronal and sagittal planes.  Comparison is made with the previous study dated 06/24/2022.  The lungs are poorly expanded with significant respiratory motion artifacts.  There are groundglass opacities/infiltrate in the right upper lobe anterior segment which were not seen in the previous study.  There is  mild-to-moderate pulmonary vascular congestion. This is moderate to more progressive since the previous study and partly may be due to poor lung expansion.  There is a small bibasal pleural effusion which was not noted in the previous study.  There is a small pleural-based nodule in the right upper lobe anteriorly, image #58 in series 4, measuring 5 mm in greatest dimension. This is unchanged. A focal pleural thickening/pleural based nodule is seen in the left lower lobe anterolaterally, image #126 in series 4 which is also similar to the previous study. It measures 6 mm at the base. No change.  The central airways patent. No endobronchial abnormality or nodule.  The limited visualized soft tissues of the neck are unremarkable. There is moderate lobulated fullness of the thyroid gland. No discrete nodule is identified. However due to absence of contrast enhancement and isodense nodule may not be excluded.  No axillary lymphadenopathy.  Severe atheromatous changes of the abdominal aorta is seen. No aneurysmal dilatation.  Severe atheromatous changes of coronary arteries.  There is significant dilatation of central pulmonary arteries suggesting pulmonary arterial hypertension.  Atheromatous changes of the coronary arteries.  Limited visualized unenhanced abdomen is unremarkable except for lobulation/nodules in both adrenal glands, left more than the right. Incompletely visualized significantly distended gallbladder seen. No radiopaque stone in the visualized gallbladder. The pancreas and kidneys are incompletely visualized and not evaluated.  Images reviewed in bone window show no acute bony abnormality or a bone lesion.      Impression: 1. Groundglass opacity/nodule in the upper lobes may represent an evolving infiltrate. 2. Pulmonary vascular congestion and small bibasal pleural effusion. 3. Pulmonary arterial hypertension. 4. Small pleural-based nodules in the lungs bilaterally are stable since the previous study  and probably represent chronic inflammatory process. No features to suggest lung malignancy.    This report was finalized on 04/03/2023 07:35 by Dr. Michelle Vigil MD.     Personal review of imaging: CXR shows Reviewed CT scan of the chest which showed evolving infiltrate in the lung, multiple recommendations small bilateral pleural effusion pulmonary artery hypertension and pleural-based nodules bilaterally      Pulmonary Assessment:  1. Acute on chronic hypoxic respiratory failure  2. Bilateral pulm infiltrate with leukocytosis  3. Community-acquired pneumonia versus interstitial pneumonitis  4. Pulmonary edema and volume overload  5. Chronic congestive diastolic heart failure  6. Atrial fibrillation  7. COPD with history of tobacco abuse  8. Meningioma s/p craniotomy.    9. S/p washout craniotomy and cranioplasty with flap infection  10. Hypertension  11. Diabetes mellitus insulin-dependent  12. Coronary artery disease  13. Obesity likely obstructive sleep apnea  14. Seizure disorder  15. Chronic pain    Recommend/plan:   · Patient was seen in the follow-up visit in pulmonary rounds today.  · He is still dependent on the BiPAP at night but currently using oxygen 2 L when resting.  · Oxygen saturation 94%.  He is feeling better.  He is steroid dosage is increased to 60 mg thrice daily.    · Dr Portillo is planning surgery of craniectomy flap in 1 - 2 days.  Cardiology is also following.  · Continue bronchodilator treatment supportive respiratory care, oxygen and BiPAP at night and as needed  · Continue Zosyn and vancomycin for pneumonia.  CT scan showed evolving infiltrates.  · DVT and stress ulcer prophylaxis reviewed.  Respiratory viral panel is negative.    · Connective tissue disorder work-up is pending  · Pain and anxiety control addressed.  Management of blood pressure and diabetes per primary care team.  · Patient will need outpatient pulmonary function test and further work-up in pulmonary clinic after  his discharge.  · PT/OT/nutritional support.  Continue current care plan and repeat labs and imaging studies from time to time.  · CODE STATUS: Full.  Overall prognosis: Guarded.  · We will follow    This visit was performed by both a physician and an [facility] RN.  I personally evaluated and examined the patient.  I performed all aspects of the medical decision making as documented.    Electronically signed by     Tracy Gupta MD,  Pulmonologist/Intensivist   4/3/2023, 17:31 CDT

## 2023-04-03 NOTE — PROGRESS NOTES
Neurosurgery Daily Progress Note    HPI:  Elijah Escobar is a 67 y.o. male with a significant medical history of hypertension, diabetes, hyperlipidemia, seizures, craniotomy for tumor (6/16/2022), craniotomy for washout (7/1/2022), craniectomy (10/4/2022), coronary artery disease, diabetes, erectile dysfunction, GERD, hypertension, hyperlipidemia, tobacco abuse, and obesity.  He presents today with a complaint of a 4-5 days onset of progressively worsening right frontal, temporal, and periorbital edema and associated symptoms to include, but not limited to generalized fatigue, chills, dyspnea, intermittent nausea without vomiting, and states he's felt feverish.  Physical exam findings of neurologically intact with right frontal, temporal, and periorbital swelling.  WBC elevated at 15.  3 to an CRP elevated at 14.75.  Imaging pending.    Assessment:   Past Medical History:   Diagnosis Date   • Brain tumor    • Coronary artery disease    • COVID-19 vaccine series completed     MODERNA X 3; LAST DOSE 3/2022   • Diabetes    • Erectile dysfunction    • GERD (gastroesophageal reflux disease)    • Hypercholesteremia    • Hypertension    • Seizure      Active Hospital Problems    Diagnosis    • **Facial swelling    • Myocardial injury    • Acute pulmonary edema due to A-fib RVR    • Acute respiratory failure with hypoxia    • Paroxysmal atrial fibrillation with rapid ventricular response    • Type 2 diabetes mellitus with hyperglycemia and peripheral neuropathy, with long-term current use of insulin (HCC)    • Coronary artery disease    • Meningioma s/p craniotomy      Plan:   Neuro: Stable, intact  Infected cranial flap   Plan for OR tomorrow, 4/4/2023 if cleared by hospitalist   Flap tapped cultures 4+ gram-negative bacilli         CV: AFib with RVR and heart strain and trop bump   Stabilized    Appreciate Cards  Pulm: Possible PNA with desats overnight   Vapotherm and CPAP   Abx per hospitalist  :  No patel.   "UTI.  FEN: Reg diet for now   Fluid balance for sepsis vs pulmonary edema   N.p.o. after midnight for OR  Endocrine: SSI with blood glucose in 300s.     Consider Insulin gtt  GI: SHERIF  ID: WBC 11 ? 17, CRP and ESR elevated, febrile overnight   PNA   Infected cranial flap   UTI with cultures pending.    CSF: no growth to date   Cont Zosyn and vancomycin  Heme:  DVT prophylaxis  Pain: w no complaints at present  Dispo: PT/OT    OR in a.m., 4/4/2023    Chief complaint:   4-5 days onset of progressively worsening right frontal, temporal, and periorbital edema and associated symptoms to include, but not limited to generalized fatigue, chills, dyspnea, intermittent nausea without vomiting, and states he's felt feverish.    HPI  Subjective  Doing well    Temp:  [97.2 °F (36.2 °C)-98.1 °F (36.7 °C)] 97.7 °F (36.5 °C)  Heart Rate:  [] 119  Resp:  [18-27] 22  BP: ()/() 122/65    Output by Drain (mL) 04/02/23 0701 - 04/02/23 1900 04/02/23 1901 - 04/03/23 0700 04/03/23 0701 - 04/03/23 0908 Range Total   Patient has no LDAs of requested type attached.     Objective:  Vital signs: (most recent): Blood pressure 122/65, pulse 119, temperature 97.7 °F (36.5 °C), temperature source Oral, resp. rate 22, height 177.8 cm (70\"), weight 119 kg (261 lb 4.8 oz), SpO2 90 %.        Neurologic Exam     Mental Status   Oriented to person, place, and time.   Speech: speech is normal   Level of consciousness: alert    Cranial Nerves     CN III, IV, VI   Pupils are equal, round, and reactive to light.  Extraocular motions are normal.     CN V   Facial sensation intact.     CN VII   Facial expression full, symmetric.     Motor Exam   Right arm pronator drift: absent  Left arm pronator drift: absent    Strength   Right deltoid: 5/5  Left deltoid: 5/5  Right biceps: 5/5  Left biceps: 5/5  Right triceps: 5/5  Left triceps: 5/5  Right iliopsoas: 5/5  Left iliopsoas: 5/5  Right quadriceps: 5/5  Left quadriceps: 5/5  Right gastroc: " 5/5  Left gastroc: 5/5    Sensory Exam   Light touch normal.     Drains: * No LDAs found *    Imaging Results (Last 24 Hours)     Procedure Component Value Units Date/Time    CT Chest Without Contrast Diagnostic [[id number]] Collected: 04/03/23 0723     Updated: 04/03/23 0738    Narrative:      EXAMINATION: CT CHEST WO CONTRAST DIAGNOSTIC-      4/3/2023 5:46 AM CDT     HISTORY: COPD, surveillance; Z74.09-Other reduced mobility     In order to have a CT radiation dose as low as reasonably achievable  Automated Exposure Control was utilized for adjustment of the mA and/or  KV according to patient size.     DLP in mGycm= 339     The CT scan of the chest is performed without intravenous contrast  enhancement.     The images are acquired in axial plane and subsequent reconstruction in  coronal and sagittal planes.     Comparison is made with the previous study dated 06/24/2022.     The lungs are poorly expanded with significant respiratory motion  artifacts.     There are groundglass opacities/infiltrate in the right upper lobe  anterior segment which were not seen in the previous study.     There is mild-to-moderate pulmonary vascular congestion. This is  moderate to more progressive since the previous study and partly may be  due to poor lung expansion.     There is a small bibasal pleural effusion which was not noted in the  previous study.     There is a small pleural-based nodule in the right upper lobe  anteriorly, image #58 in series 4, measuring 5 mm in greatest dimension.  This is unchanged. A focal pleural thickening/pleural based nodule is  seen in the left lower lobe anterolaterally, image #126 in series 4  which is also similar to the previous study. It measures 6 mm at the  base. No change.     The central airways patent. No endobronchial abnormality or nodule.     The limited visualized soft tissues of the neck are unremarkable. There  is moderate lobulated fullness of the thyroid gland. No discrete  nodule  is identified. However due to absence of contrast enhancement and  isodense nodule may not be excluded.     No axillary lymphadenopathy.     Severe atheromatous changes of the abdominal aorta is seen. No  aneurysmal dilatation.     Severe atheromatous changes of coronary arteries.     There is significant dilatation of central pulmonary arteries suggesting  pulmonary arterial hypertension.     Atheromatous changes of the coronary arteries.     Limited visualized unenhanced abdomen is unremarkable except for  lobulation/nodules in both adrenal glands, left more than the right.  Incompletely visualized significantly distended gallbladder seen. No  radiopaque stone in the visualized gallbladder. The pancreas and kidneys  are incompletely visualized and not evaluated.     Images reviewed in bone window show no acute bony abnormality or a bone  lesion.       Impression:      1. Groundglass opacity/nodule in the upper lobes may represent an  evolving infiltrate.  2. Pulmonary vascular congestion and small bibasal pleural effusion.  3. Pulmonary arterial hypertension.  4. Small pleural-based nodules in the lungs bilaterally are stable since  the previous study and probably represent chronic inflammatory process.  No features to suggest lung malignancy.           This report was finalized on 04/03/2023 07:35 by Dr. Michelle Vigil MD.        Lab Results (last 24 hours)     Procedure Component Value Units Date/Time    POC Glucose Once [[id number]]  (Abnormal) Collected: 04/03/23 0801    Specimen: Blood Updated: 04/03/23 0812     Glucose 188 mg/dL      Comment: : [id number] Damon Rodrigues ID: [id number]       POC Glucose Once [[id number]]  (Abnormal) Collected: 04/03/23 0721    Specimen: Blood Updated: 04/03/23 0732     Glucose 190 mg/dL      Comment: : [id number] Damon Rodrigues ID: [id number]       Culture, CSF - Cerebrospinal Fluid, Lumbar Puncture [[id number]] Collected: 03/31/23 1450     Specimen: Cerebrospinal Fluid from Lumbar Puncture Updated: 04/03/23 0729     CSF Culture No growth at 2 days     Gram Stain No No organisms seen      Occasional WBCs seen    POC Glucose Once [[id number]]  (Abnormal) Collected: 04/03/23 0618    Specimen: Blood Updated: 04/03/23 0629     Glucose 168 mg/dL      Comment: : [id number] Yesy Vega ID: [id number]       Wound Culture - Aspirate, Forehead [[id number]]  (Abnormal) Collected: 04/01/23 1120    Specimen: Aspirate from Forehead Updated: 04/03/23 0621     Wound Culture Heavy growth (4+) Gram Negative Bacilli     Gram Stain Many (4+) WBCs seen      No organisms seen    Comprehensive Metabolic Panel [[id number]]  (Abnormal) Collected: 04/03/23 0436    Specimen: Blood Updated: 04/03/23 0537     Glucose 102 mg/dL      BUN 36 mg/dL      Creatinine 1.16 mg/dL      Sodium 139 mmol/L      Potassium 3.6 mmol/L      Chloride 102 mmol/L      CO2 23.0 mmol/L      Calcium 8.6 mg/dL      Total Protein 6.8 g/dL      Albumin 3.1 g/dL      ALT (SGPT) 11 U/L      AST (SGOT) 22 U/L      Alkaline Phosphatase 89 U/L      Total Bilirubin 0.7 mg/dL      Globulin 3.7 gm/dL      A/G Ratio 0.8 g/dL      BUN/Creatinine Ratio 31.0     Anion Gap 14.0 mmol/L      eGFR 69.0 mL/min/1.73     Narrative:      GFR Normal >60  Chronic Kidney Disease <60  Kidney Failure <15      Vancomycin, Trough [[id number]]  (Normal) Collected: 04/03/23 0436    Specimen: Blood Updated: 04/03/23 0533     Vancomycin Trough 10.30 mcg/mL     CBC (No Diff) [[id number]]  (Abnormal) Collected: 04/03/23 0436    Specimen: Blood Updated: 04/03/23 0512     WBC 11.00 10*3/mm3      RBC 3.63 10*6/mm3      Hemoglobin 11.0 g/dL      Hematocrit 34.5 %      MCV 95.0 fL      MCH 30.3 pg      MCHC 31.9 g/dL      RDW 12.0 %      RDW-SD 41.8 fl      MPV 11.7 fL      Platelets 188 10*3/mm3     Antinuclear Antigen Antibody, IFA [[id number]] Collected: 04/03/23 0436    Specimen: Blood Updated: 04/03/23 0454    Blood Gas, Arterial  - [[id number]]  (Abnormal) Collected: 04/03/23 0423    Specimen: Arterial Blood Updated: 04/03/23 0423     Site Right Radial     Marek's Test Positive     pH, Arterial 7.482 pH units      Comment: 83 Value above reference range        pCO2, Arterial 32.9 mm Hg      Comment: 84 Value below reference range        pO2, Arterial 85.7 mm Hg      HCO3, Arterial 24.6 mmol/L      Base Excess, Arterial 1.5 mmol/L      O2 Saturation, Arterial 97.5 %      Temperature 37.0 C      Barometric Pressure for Blood Gas 745 mmHg      Modality BiPap     FIO2 70 %      Ventilator Mode NA     Set Mech Resp Rate 20.0     IPAP 16     Comment: Meter: [id number]     :  [id number]        EPAP 8     Collected by [id number]     pCO2, Temperature Corrected 32.9 mm Hg      pH, Temp Corrected 7.482 pH Units      pO2, Temperature Corrected 85.7 mm Hg     POC Glucose Once [[id number]]  (Normal) Collected: 04/03/23 0401    Specimen: Blood Updated: 04/03/23 0412     Glucose 104 mg/dL      Comment: : [id number] Yesy Vega ID: [id number]       POC Glucose Once [[id number]]  (Normal) Collected: 04/03/23 0156    Specimen: Blood Updated: 04/03/23 0207     Glucose 110 mg/dL      Comment: : [id number] Yesy Vega ID: [id number]       C-reactive Protein [[id number]]  (Abnormal) Collected: 04/03/23 0040    Specimen: Blood Updated: 04/03/23 0137     C-Reactive Protein 21.26 mg/dL     Sedimentation Rate [[id number]]  (Abnormal) Collected: 04/03/23 0040    Specimen: Blood Updated: 04/03/23 0116     Sed Rate 92 mm/hr     POC Glucose Once [[id number]]  (Normal) Collected: 04/03/23 0039    Specimen: Blood Updated: 04/03/23 0050     Glucose 101 mg/dL      Comment: : [id number] Yesy Vega ID: [id number]       POC Glucose Once [[id number]]  (Normal) Collected: 04/02/23 2339    Specimen: Blood Updated: 04/02/23 2350     Glucose 118 mg/dL      Comment: : [id number] Yesy Vega ID: [id number]       POC Glucose  Once [[id number]]  (Normal) Collected: 04/02/23 2232    Specimen: Blood Updated: 04/02/23 2243     Glucose 114 mg/dL      Comment: : [id number] Yesy LyeMeter ID: [id number]       S. Pneumo Ag Urine or CSF - Urine, Urine, Clean Catch [[id number]]  (Normal) Collected: 04/02/23 2148    Specimen: Urine, Clean Catch Updated: 04/02/23 2214     Strep Pneumo Ag Negative    Legionella Antigen, Urine - Urine, Urine, Clean Catch [[id number]]  (Normal) Collected: 04/02/23 2148    Specimen: Urine, Clean Catch Updated: 04/02/23 2213     LEGIONELLA ANTIGEN, URINE Negative    POC Glucose Once [[id number]]  (Abnormal) Collected: 04/02/23 2158    Specimen: Blood Updated: 04/02/23 2209     Glucose 146 mg/dL      Comment: : [id number] Yesy CarrieMeter ID: [id number]       POC Glucose Once [[id number]]  (Abnormal) Collected: 04/02/23 2117    Specimen: Blood Updated: 04/02/23 2128     Glucose 229 mg/dL      Comment: : [id number] Yesy CarrieMeter ID: [id number]       POC Glucose Once [[id number]]  (Abnormal) Collected: 04/1955    Specimen: Blood Updated: 04/02/23 2006     Glucose 134 mg/dL      Comment: : [id number] Yesy CarrieMeter ID: [id number]       POC Glucose Once [[id number]]  (Abnormal) Collected: 04/02/23 1836    Specimen: Blood Updated: 04/02/23 1846     Glucose 168 mg/dL      Comment: : [id number] Felipe TripathiherMeter ID: [id number]       POC Glucose Once [[id number]]  (Abnormal) Collected: 04/02/23 1735    Specimen: Blood Updated: 04/02/23 1747     Glucose 192 mg/dL      Comment: : [id number] Felipe HeatherMeter ID: [id number]       POC Glucose Once [[id number]]  (Abnormal) Collected: 04/02/23 1638    Specimen: Blood Updated: 04/02/23 1649     Glucose 186 mg/dL      Comment: : [id number] Felipe HeatherMeter ID: [id number]       MRSA Screen, PCR (Inpatient) - Swab, Nares [[id number]]  (Normal) Collected: 04/02/23 1512    Specimen: Swab from Nares Updated: 04/02/23 1636     MRSA PCR  No MRSA Detected    Narrative:      The negative predictive value of this diagnostic test is high and should only be used to consider de-escalating anti-MRSA therapy. A positive result may indicate colonization with MRSA and must be correlated clinically.    Anti-neutrophilic Cytoplasmic Antibody [[id number]] Collected: 04/02/23 1551    Specimen: Blood Updated: 04/02/23 1617    Respiratory Panel PCR w/COVID-19(SARS-CoV-2) [facility] In-House, NP Swab in UTM/VTM, 3-4 HR TAT - Swab, Nasopharynx [[id number]]  (Normal) Collected: 04/02/23 1512    Specimen: Swab from Nasopharynx Updated: 04/02/23 1608     ADENOVIRUS, PCR Not Detected     Coronavirus 229E Not Detected     Coronavirus HKU1 Not Detected     Coronavirus NL63 Not Detected     Coronavirus OC43 Not Detected     COVID19 Not Detected     Human Metapneumovirus Not Detected     Human Rhinovirus/Enterovirus Not Detected     Influenza A PCR Not Detected     Influenza B PCR Not Detected     Parainfluenza Virus 1 Not Detected     Parainfluenza Virus 2 Not Detected     Parainfluenza Virus 3 Not Detected     Parainfluenza Virus 4 Not Detected     RSV, PCR Not Detected     Bordetella pertussis pcr Not Detected     Bordetella parapertussis PCR Not Detected     Chlamydophila pneumoniae PCR Not Detected     Mycoplasma pneumo by PCR Not Detected    Narrative:      In the setting of a positive respiratory panel with a viral infection PLUS a negative procalcitonin without other underlying concern for bacterial infection, consider observing off antibiotics or discontinuation of antibiotics and continue supportive care. If the respiratory panel is positive for atypical bacterial infection (Bordetella pertussis, Chlamydophila pneumoniae, or Mycoplasma pneumoniae), consider antibiotic de-escalation to target atypical bacterial infection.    POC Glucose Once [[id number]]  (Abnormal) Collected: 04/02/23 1535    Specimen: Blood Updated: 04/02/23 1546     Glucose 200  mg/dL      Comment: : [id number] Felipe HeatherMeter ID: [id number]       Blood Gas, Arterial - [[id number]]  (Abnormal) Collected: 04/02/23 1455    Specimen: Arterial Blood Updated: 04/02/23 1454     Site Left Brachial     Marek's Test N/A     pH, Arterial 7.503 pH units      Comment: 83 Value above reference range        pCO2, Arterial 28.3 mm Hg      Comment: 84 Value below reference range        pO2, Arterial 52.2 mm Hg      Comment: 85 Value below critical limit        HCO3, Arterial 22.2 mmol/L      Base Excess, Arterial 0.0 mmol/L      Comment: 84 Value below reference range        O2 Saturation, Arterial 89.4 %      Comment: 84 Value below reference range        Temperature 37.0 C      Barometric Pressure for Blood Gas 749 mmHg      Modality Heated HFNC     FIO2 80 %      Flow Rate 35.0 lpm      Ventilator Mode NA     Notified Who DR BRASHER     Notified By [id number]     Notified Time 04/02/2023 14:55     Collected by [id number]     Comment: Meter: [id number]     :  [id number]        pCO2, Temperature Corrected 28.3 mm Hg      pH, Temp Corrected 7.503 pH Units      pO2, Temperature Corrected 52.2 mm Hg     POC Glucose Once [[id number]]  (Abnormal) Collected: 04/02/23 1433    Specimen: Blood Updated: 04/02/23 1445     Glucose 216 mg/dL      Comment: : [id number] Felipe HeatherMeter ID: [id number]       POC Glucose Once [[id number]]  (Abnormal) Collected: 04/02/23 1326    Specimen: Blood Updated: 04/02/23 1339     Glucose 275 mg/dL      Comment: : [id number] Uvaldo Grissom ID: [id number]       Blood Culture - Blood, Arm, Right [[id number]]  (Normal) Collected: 03/31/23 1251    Specimen: Blood from Arm, Right Updated: 04/02/23 1330     Blood Culture No growth at 2 days    Blood Culture - Blood, Arm, Left [[id number]]  (Normal) Collected: 03/31/23 1249    Specimen: Blood from Arm, Left Updated: 04/02/23 1330     Blood Culture No growth at 2 days    POC Glucose Once [[id number]]  (Abnormal)  "Collected: 04/02/23 1230    Specimen: Blood Updated: 04/02/23 1241     Glucose 293 mg/dL      Comment: : [id number] Felipe HeatherMeter ID: [id number]       POC Glucose Once [[id number]]  (Abnormal) Collected: 04/02/23 1126    Specimen: Blood Updated: 04/02/23 1147     Glucose 304 mg/dL      Comment: : [id number] Felipe HeatherMeter ID: [id number]       BNP [[id number]]  (Abnormal) Collected: 04/02/23 0613    Specimen: Blood Updated: 04/02/23 1123     proBNP 10,168.0 pg/mL     Narrative:      Among patients with dyspnea, NT-proBNP is highly sensitive for the detection of acute congestive heart failure. In addition NT-proBNP of <300 pg/ml effectively rules out acute congestive heart failure with 99% negative predictive value.    Results may be falsely decreased if patient taking Biotin.      Urine Culture - Urine, Urine, Clean Catch [[id number]]  (Normal) Collected: 03/31/23 2346    Specimen: Urine, Clean Catch Updated: 04/02/23 1051     Urine Culture No growth    D-dimer, Quantitative [[id number]]  (Normal) Collected: 04/02/23 1020    Specimen: Blood Updated: 04/02/23 1043     D-Dimer, Quantitative 0.66 MCGFEU/mL     Narrative:      According to the assay 's published package insert, a normal (<0.50 MCGFEU/mL) D-dimer result in conjunction with a non-high clinical probability assessment, excludes deep vein thrombosis (DVT) and pulmonary embolism (PE) with high sensitivity.    D-dimer values increase with age and this can make VTE exclusion of an older population difficult. To address this, the [employer], based on best available evidence and recent guidelines, recommends that clinicians use age-adjusted D-dimer thresholds in patients greater than 50 years of age with: a) a low probability of PE who do not meet all Pulmonary Embolism Rule Out Criteria, or b) in those with intermediate probability of PE.   The formula for an age-adjusted D-dimer cut-off is \"age/100\".  For example, a " 60 year old patient would have an age-adjusted cut-off of 0.60 MCGFEU/mL and an 80 year old 0.80 MCGFEU/mL.    POC Glucose Once [[id number]]  (Abnormal) Collected: 04/02/23 1026    Specimen: Blood Updated: 04/02/23 1037     Glucose 291 mg/dL      Comment: : [id number] Felipe DeuceherMeter ID: [id number]           I have personally reviewed this patient's history of present illness, physical exam, medical decision making and/or plan of care in detail and have made updates and/or changes to the electronic health record as deemed necessary, otherwise, there has been no changes as documented.    [id number]  Rahul Arnold, APRN

## 2023-04-03 NOTE — PLAN OF CARE
Goal Outcome Evaluation:  Plan of Care Reviewed With: patient           Outcome Evaluation: OT tx completed. Pt presents alert, sitting up in chair, agreeable to therapy this am. He reports feeling better this am and is ready to work with therapy. Worked on increasing UE strength and endurance for improved independence with all adls. He worked through 15 reps x 1 set of UE strengthening HEP with red theraband. Completed 6 reps x 2 sets of sit <> stand t/f with O2 sats maintained in mid to low 90s with all activity on 2L O2/NC. He was left sitting in chair at end of session. Possible plan for cranial flap removal later this date. Continue OT POC.

## 2023-04-03 NOTE — PROGRESS NOTES
"Pharmacy Dosing Service  Pharmacokinetics  Vancomycin Follow-up Evaluation    Assessment/Action/Plan:  Active Hospital Problems    Diagnosis  POA    **Facial swelling [R22.0]  Yes    Myocardial injury [I5A]  Unknown     Acute (elevated hsTr with significant delta) myocardial injury - without ischemic symptoms or ecg changes, this is NOT a NSTEMI      Acute pulmonary edema due to A-fib RVR [J81.0]  No    Acute respiratory failure with hypoxia [J96.01]  No    Paroxysmal atrial fibrillation with rapid ventricular response [I48.0]  Yes    Type 2 diabetes mellitus with hyperglycemia and peripheral neuropathy, with long-term current use of insulin (HCC) [E11.65, Z79.4]  Not Applicable    Coronary artery disease [I25.10]  Yes    Meningioma s/p craniotomy [D32.9]  Yes       Current Order: Vancomycin 1000 mg IVPB every 12 hours  Indication: broad spectrum coverage, intracranial infection  Current end date:final dose 4/7/23 1800    Levels/Previous evaluations:   Started with 1750 mg IV load dose, followed by 1g IV Q12H. Trough prior to 5th overall dose was 10.3 mcg/mL collected 10.75 hours post-dose    Other antimicrobials and/or additional factors considered in dosing methods:   Also on Zosyn, CNS coverage, PSH neurosurgical procedures, febrile during stay, WBC elevated    AUC Model Data - New regimen:  Regimen: 1250 mg IV every 12 hours.  Exposure target: AUC24 (range)400-600 mg/L.hr   AUC24,ss: 491 mg/L.hr  PAUC*: 83 %  Ctrough,ss: 16.1 mg/L  Pconc*: 27 %  Tox.: 11 %      Plan: adjust to 1250 mg IV every 12 hours    Clinical pharmacist following daily     Subjective:  Elijah Escobar is a 67 y.o. male currently on Vancomycin, day 3 of treatment.      Objective:  Ht: 177.8 cm (70\"); Wt: 119 kg (261 lb 4.8 oz)  Estimated Creatinine Clearance: 79.9 mL/min (by C-G formula based on SCr of 1.16 mg/dL).   Creatinine   Date Value Ref Range Status   04/03/2023 1.16 0.76 - 1.27 mg/dL Final   04/02/2023 1.17 0.76 - 1.27 mg/dL Final "   04/01/2023 1.26 0.76 - 1.27 mg/dL Final      Lab Results   Component Value Date    WBC 11.00 (H) 04/03/2023    WBC 17.92 (H) 04/02/2023    WBC 19.05 (H) 04/01/2023         Lab Results   Component Value Date    CARMEN 10.30 04/03/2023       Culture Results:  Microbiology Results (last 10 days)       Procedure Component Value - Date/Time    Legionella Antigen, Urine - Urine, Urine, Clean Catch [[id number]]  (Normal) Collected: 04/02/23 2148    Lab Status: Final result Specimen: Urine, Clean Catch Updated: 04/02/23 2213     LEGIONELLA ANTIGEN, URINE Negative    S. Pneumo Ag Urine or CSF - Urine, Urine, Clean Catch [[id number]]  (Normal) Collected: 04/02/23 2148    Lab Status: Final result Specimen: Urine, Clean Catch Updated: 04/02/23 2214     Strep Pneumo Ag Negative    Respiratory Panel PCR w/COVID-19(SARS-CoV-2) [facility] In-House, NP Swab in UTM/VTM, 3-4 HR TAT - Swab, Nasopharynx [[id number]]  (Normal) Collected: 04/02/23 1512    Lab Status: Final result Specimen: Swab from Nasopharynx Updated: 04/02/23 1608     ADENOVIRUS, PCR Not Detected     Coronavirus 229E Not Detected     Coronavirus HKU1 Not Detected     Coronavirus NL63 Not Detected     Coronavirus OC43 Not Detected     COVID19 Not Detected     Human Metapneumovirus Not Detected     Human Rhinovirus/Enterovirus Not Detected     Influenza A PCR Not Detected     Influenza B PCR Not Detected     Parainfluenza Virus 1 Not Detected     Parainfluenza Virus 2 Not Detected     Parainfluenza Virus 3 Not Detected     Parainfluenza Virus 4 Not Detected     RSV, PCR Not Detected     Bordetella pertussis pcr Not Detected     Bordetella parapertussis PCR Not Detected     Chlamydophila pneumoniae PCR Not Detected     Mycoplasma pneumo by PCR Not Detected    Narrative:      In the setting of a positive respiratory panel with a viral infection PLUS a negative procalcitonin without other underlying concern for bacterial infection, consider observing  off antibiotics or discontinuation of antibiotics and continue supportive care. If the respiratory panel is positive for atypical bacterial infection (Bordetella pertussis, Chlamydophila pneumoniae, or Mycoplasma pneumoniae), consider antibiotic de-escalation to target atypical bacterial infection.    MRSA Screen, PCR (Inpatient) - Swab, Nares [[id number]]  (Normal) Collected: 04/02/23 1512    Lab Status: Final result Specimen: Swab from Nares Updated: 04/02/23 1636     MRSA PCR No MRSA Detected    Narrative:      The negative predictive value of this diagnostic test is high and should only be used to consider de-escalating anti-MRSA therapy. A positive result may indicate colonization with MRSA and must be correlated clinically.    Wound Culture - Aspirate, Forehead [[id number]]  (Abnormal) Collected: 04/01/23 1120    Lab Status: Preliminary result Specimen: Aspirate from Forehead Updated: 04/03/23 0621     Wound Culture Heavy growth (4+) Gram Negative Bacilli     Gram Stain Many (4+) WBCs seen      No organisms seen    Urine Culture - Urine, Urine, Clean Catch [[id number]]  (Normal) Collected: 03/31/23 2346    Lab Status: Final result Specimen: Urine, Clean Catch Updated: 04/02/23 1051     Urine Culture No growth    AFB Culture - Cerebrospinal Fluid, Lumbar Puncture [[id number]] Collected: 03/31/23 1450    Lab Status: Preliminary result Specimen: Cerebrospinal Fluid from Lumbar Puncture Updated: 04/01/23 1320     AFB Stain No acid fast bacilli seen on direct smear    Culture, CSF - Cerebrospinal Fluid, Lumbar Puncture [[id number]] Collected: 03/31/23 1450    Lab Status: Preliminary result Specimen: Cerebrospinal Fluid from Lumbar Puncture Updated: 04/03/23 0729     CSF Culture No growth at 2 days     Gram Stain No No organisms seen      Occasional WBCs seen    Blood Culture - Blood, Arm, Right [[id number]]  (Normal) Collected: 03/31/23 1251    Lab Status: Preliminary result Specimen: Blood from Arm, Right Updated:  04/02/23 1330     Blood Culture No growth at 2 days    Blood Culture - Blood, Arm, Left [[id number]]  (Normal) Collected: 03/31/23 1249    Lab Status: Preliminary result Specimen: Blood from Arm, Left Updated: 04/02/23 1330     Blood Culture No growth at 2 days            Wagner Escobar, PharmD   04/03/23 08:03 CDT

## 2023-04-03 NOTE — PAYOR COMM NOTE
"FROM: AKSHAT FLORES  PHONE: [phone]  FAX: [phone]    PENDING: [id number]    Virgil Escobar (67 y.o. Male)     Date of Birth   1955    Social Security Number       Address   [address] [place] [id number]    Home Phone   [phone]    MRN   [id number]       Anabaptism   Non-Islam    Marital Status                               Admission Date   3/31/23    Admission Type   Urgent    Admitting Provider   Flaco Portillo MD    Attending Provider   Flaco Portillo MD    Department, Room/Bed   [facility] INTENSIVE CARE, I003/1       Discharge Date       Discharge Disposition       Discharge Destination                               Attending Provider: Flaco Portillo MD    Allergies: No Known Allergies    Isolation: None   Infection: COVID (rule out) (04/02/23)   Code Status: CPR    Ht: 177.8 cm (70\")   Wt: 119 kg (261 lb 4.8 oz)    Admission Cmt: None   Principal Problem: Facial swelling [R22.0]                 Active Insurance as of 3/31/2023     Primary Coverage     Payor Plan Insurance Group Employer/Plan Group    ANTHEM MEDICARE REPLACEMENT ANTHEM MEDICARE ADVANTAGE [id number]     Payor Plan Address Payor Plan Phone Number Payor Plan Fax Number Effective Dates    [address] [phone]  1/1/2022 - None Entered    [facility] [id number]       Subscriber Name Subscriber Birth Date Member ID       VIRGIL ESCOBAR 1955 [id number]                 Emergency Contacts      (Rel.) Home Phone Work Phone Mobile Phone    Belem Guzmántoni (Son) -- -- [phone]    Manju Lua (Relative) -- -- [phone]    ESCOBARMAYTE CARO (Spouse) [phone] -- --    david lua (Sister) [phone] -- --    josie benavides (Sister) -- -- [phone]    Jeevan Regan [phone] -- --               History & Physical      Rahul Arnold APRN at 03/31/23 1228     Attestation signed by Flaco Portillo MD at 04/01/23 0823    Virgil Escobar " is a 67 y.o. male with a significant medical history of hypertension, diabetes, hyperlipidemia, seizures, craniotomy for tumor (6/16/2022), craniotomy for washout (7/1/2022), craniectomy (10/4/2022), coronary artery disease, diabetes, erectile dysfunction, GERD, hypertension, hyperlipidemia, tobacco abuse, and obesity.  He presents today with a complaint of a 4-5 days onset of progressively worsening right frontal, temporal, and periorbital edema and associated symptoms to include, but not limited to generalized fatigue, chills, dyspnea, intermittent nausea without vomiting, and states he's felt feverish.  Physical exam findings of neurologically intact with right frontal, temporal, and periorbital swelling.  WBC elevated at 15.  3 to an CRP elevated at 14.75.  Imaging pending.     Differential Diagnosis:   Pseudomeningocele versus recurrent infection cranial flap     Recommendations:  CSF appears to be clean  Awaiting cultures  PNA and on Abx per hospitalist  Atrial fibrilation with RVR and heart strain.  Appreciate Cards  Plan for removal of infected cranial flap early next week when more stable.                  NEUROSURGERY INITIAL HOSPITAL ENCOUNTER    Assessment/Plan:   Elijah Escobar is a 67 y.o. male with a significant medical history of hypertension, diabetes, hyperlipidemia, seizures, craniotomy for tumor (6/16/2022), craniotomy for washout (7/1/2022), craniectomy (10/4/2022), coronary artery disease, diabetes, erectile dysfunction, GERD, hypertension, hyperlipidemia, tobacco abuse, and obesity.  He presents today with a complaint of a 4-5 days onset of progressively worsening right frontal, temporal, and periorbital edema and associated symptoms to include, but not limited to generalized fatigue, chills, dyspnea, intermittent nausea without vomiting, and states he's felt feverish.  Physical exam findings of neurologically intact with right frontal, temporal, and periorbital swelling.  WBC elevated at 15.   3 to an CRP elevated at 14.75.  Imaging pending.    Differential Diagnosis:   Pseudomeningocele versus recurrent intracranial infection    Recommendations:  Admit  Routine labs and blood cultures  CT of the head  MRI of the brain with and without  IR LP for CSF  Hold antibiotics until labs and CSF can be obtained.  Neuro exams per policy.  Call for decline.  NPO for now  Additional recommendations to be follow-up with Dr. Portillo.    Thank you very much for this interesting consult.   ___________________________________________________________________    Chief Complaint: Increased facial swelling    HPI: Elijah Escobar is a 67 y.o. male with a significant medical history of hypertension, diabetes, hyperlipidemia, seizures, craniotomy for tumor (6/16/2022), craniotomy for washout (7/1/2022), craniectomy (10/4/2022), coronary artery disease, diabetes, erectile dysfunction, GERD, hypertension, hyperlipidemia, tobacco abuse, and obesity.      On 6/16/2022 Mr. Escobar underwent right frontal craniotomy for removal of WHO grade I meningioma.  He initially did well postoperatively, however presented to [facility] ED on 6/30/2022 with complaints of persistent right-sided headache and dizziness with standing that was found to have intracranial infection necessitating incision/drainage and washout and craniotomy on 7/1/2022.  During this admission he was evaluated by infectious disease and placed on cefepime which he completed.  On 10/4/2022 he returned to the operating room for uneventful cranioplasty.  Mr. Escobar was evaluated in the emergency department on 1/23/2023 for worsening right frontal and orbital swelling.  WBC was slightly elevated at 13.40 and CT of the head shows no evidence of acute abnormalities, however there is a slight increase in extracranial and intracranial fluid adjacent to the cranial plate.    Elijah had been well since he was last evaluated on 1/25/2023 until approximately 4-5 days ago  when he developed progressively worsening right frontal, temporal, and periorbital edema. He additionally report generalized fatigue, chills, dyspnea, intermittent nausea without vomiting, and states he's felt feverish.  Elijah denies dizziness, seizure-like activity, confusion, facial asymmetry or dysesthesias and unilateral weakness.  He presents today in a wheelchair, however typically ambulates at home with a cane.  He does report falling from his shower chair 2 days ago, however denies hitting his head.    He currently rates the severity of his symptoms 8/10.  No additional concerns at this time.    Review of Systems   Constitutional: Positive for chills, fatigue and fever.   Eyes: Negative.  Negative for visual disturbance.   Respiratory: Negative.    Cardiovascular: Negative.    Gastrointestinal: Positive for nausea. Negative for vomiting.   Endocrine: Negative.    Genitourinary: Negative.    Musculoskeletal: Negative.    Skin: Negative.    Allergic/Immunologic: Negative.    Neurological: Positive for headaches. Negative for dizziness, tremors, seizures, syncope, facial asymmetry, speech difficulty, weakness, light-headedness and numbness.   Hematological: Negative.    Psychiatric/Behavioral: Negative.    All other systems reviewed and are negative.     Past Medical History:  has a past medical history of Brain tumor (HCC), Coronary artery disease, COVID-19 vaccine series completed, Diabetes (HCC), Erectile dysfunction, GERD (gastroesophageal reflux disease), Hypercholesteremia, Hypertension, and Seizure (HCC).    Past Surgical History:  has a past surgical history that includes Colonoscopy; Balloon angioplasty, artery (Right); Craniotomy for Tumor (Right, 6/16/2022); Craniectomy (Right, 7/1/2022); and Cranioplasty (Right, 10/4/2022).    Family History: family history includes Cancer in his mother; Heart disease in his father.    Social History:  reports that he has been smoking cigarettes. He has been smoking  an average of .25 packs per day. He has never used smokeless tobacco. He reports that he does not drink alcohol and does not use drugs.    Allergies: Patient has no known allergies.    Home Medications:   Current Facility-Administered Medications:   •  acetaminophen (TYLENOL) tablet 650 mg, 650 mg, Oral, Q4H PRN **OR** acetaminophen (TYLENOL) 160 MG/5ML solution 650 mg, 650 mg, Oral, Q4H PRN **OR** acetaminophen (TYLENOL) suppository 650 mg, 650 mg, Rectal, Q4H PRN, Rahul Arnold, APRN  •  amLODIPine (NORVASC) tablet 10 mg, 10 mg, Oral, Q24H, Rahul Arnold, ADITYA  •  aspirin chewable tablet 81 mg, 81 mg, Oral, Daily, Rahul Arnold, APRN  •  atorvastatin (LIPITOR) tablet 20 mg, 20 mg, Oral, Nightly, Rahul Arnold, APRN  •  butalbital-acetaminophen-caffeine (FIORICET, ESGIC) -40 MG per tablet 1 tablet, 1 tablet, Oral, Q6H PRN, Rahul Arnold, APRN  •  dextrose (D50W) (25 g/50 mL) IV injection 25 g, 25 g, Intravenous, Q15 Min PRN, Rahul Arnold, APRN  •  dextrose (GLUTOSE) oral gel 15 g, 15 g, Oral, Q15 Min PRN, Rahul Arnold, APRN  •  gabapentin (NEURONTIN) capsule 300 mg, 300 mg, Oral, TID, Rahul Arnold, APRN  •  glucagon (human recombinant) (GLUCAGEN DIAGNOSTIC) injection 1 mg, 1 mg, Intramuscular, Q15 Min PRN, Rahul Arnold, APRN  •  heparin (porcine) 5000 UNIT/ML injection 5,000 Units, 5,000 Units, Subcutaneous, Q12H, Rahul Arnold, APRN  •  Insulin Lispro (humaLOG) injection 0-24 Units, 0-24 Units, Subcutaneous, TID AC, Rahul Arnold, APRN  •  levETIRAcetam (KEPPRA) tablet 500 mg, 500 mg, Oral, BID, Rahul Arnold, APRN  •  lisinopril (PRINIVIL,ZESTRIL) tablet 20 mg, 20 mg, Oral, Daily, Rahul Arnold, APRN  •  metoprolol tartrate (LOPRESSOR) tablet 50 mg, 50 mg, Oral, Q12H, Rahul Arnold, ADITYA  •  nicotine (NICODERM CQ) 14 MG/24HR patch 1 patch, 1 patch, Transdermal, Daily PRN, Rahul Arnold, APRN  •  ondansetron (ZOFRAN) tablet 4 mg, 4 mg, Oral, Q6H PRN **OR** ondansetron (ZOFRAN) injection 4 mg, 4 mg,  Intravenous, Q6H PRN, Rahul Arnold, APRN  •  oxyCODONE-acetaminophen (PERCOCET)  MG per tablet 1 tablet, 1 tablet, Oral, Q4H PRN, Rahul Arnold, APRN  •  oxyCODONE-acetaminophen (PERCOCET) 7.5-325 MG per tablet 1 tablet, 1 tablet, Oral, Q4H PRN, Rahul Arnold, APRN  •  [START ON 4/1/2023] pantoprazole (PROTONIX) EC tablet 40 mg, 40 mg, Oral, Q AM, RusRahul farooq, APRN  •  polyethylene glycol (MIRALAX) packet 17 g, 17 g, Oral, Daily, Rahul Arnold, APRN  •  sennosides-docusate (PERICOLACE) 8.6-50 MG per tablet 1 tablet, 1 tablet, Oral, Daily, Clayton, Rahul OLMEDO, APRN  •  sodium chloride 0.9 % flush 10 mL, 10 mL, Intravenous, Q12H, Rahul Arnold, APRN  •  sodium chloride 0.9 % flush 10 mL, 10 mL, Intravenous, PRN, Clayton, Rauhl OLMEDO, APRN  •  sodium chloride 0.9 % infusion 40 mL, 40 mL, Intravenous, PRN, Rahul Arnold, APRN    Medications: Scheduled Meds:amLODIPine, 10 mg, Oral, Q24H  aspirin, 81 mg, Oral, Daily  atorvastatin, 20 mg, Oral, Nightly  gabapentin, 300 mg, Oral, TID  heparin (porcine), 5,000 Units, Subcutaneous, Q12H  insulin lispro, 0-24 Units, Subcutaneous, TID AC  levETIRAcetam, 500 mg, Oral, BID  lisinopril, 20 mg, Oral, Daily  metoprolol tartrate, 50 mg, Oral, Q12H  [START ON 4/1/2023] pantoprazole, 40 mg, Oral, Q AM  polyethylene glycol, 17 g, Oral, Daily  sennosides-docusate, 1 tablet, Oral, Daily  sodium chloride, 10 mL, Intravenous, Q12H      Continuous Infusions:   PRN Meds:.•  acetaminophen **OR** acetaminophen **OR** acetaminophen  •  butalbital-acetaminophen-caffeine  •  dextrose  •  dextrose  •  glucagon (human recombinant)  •  nicotine  •  ondansetron **OR** ondansetron  •  oxyCODONE-acetaminophen  •  oxyCODONE-acetaminophen  •  sodium chloride  •  sodium chloride    Vital Signs  Temp:  [98.5 °F (36.9 °C)] 98.5 °F (36.9 °C)  Heart Rate:  [79] 79  Resp:  [18] 18  BP: (117)/(98) 117/98    Physical Exam  Physical Exam  Vitals and nursing note reviewed.   Constitutional:       General: He is not in  acute distress.     Appearance: Normal appearance. He is well-developed and well-groomed. He is obese. He is not ill-appearing, toxic-appearing or diaphoretic.      Comments: BMI 37.16   HENT:      Head: Normocephalic and atraumatic.        Right Ear: Hearing normal.      Left Ear: Hearing normal.   Eyes:      Extraocular Movements: EOM normal.      Conjunctiva/sclera: Conjunctivae normal.      Pupils: Pupils are equal, round, and reactive to light.   Neck:      Trachea: Trachea normal.   Cardiovascular:      Rate and Rhythm: Normal rate and regular rhythm.   Pulmonary:      Effort: Pulmonary effort is normal. No tachypnea, bradypnea, accessory muscle usage or respiratory distress.   Abdominal:      Palpations: Abdomen is soft.   Musculoskeletal:      Cervical back: Full passive range of motion without pain and neck supple.   Skin:     General: Skin is warm and dry.   Neurological:      Mental Status: He is alert and oriented to person, place, and time.      GCS: GCS eye subscore is 4. GCS verbal subscore is 5. GCS motor subscore is 6.      Gait: Gait is intact.   Psychiatric:         Speech: Speech normal.         Behavior: Behavior normal. Behavior is cooperative.         Neurologic Exam     Mental Status   Oriented to person, place, and time.   Attention: normal. Concentration: normal.   Speech: speech is normal   Level of consciousness: alert    Cranial Nerves     CN II   Visual fields full to confrontation.     CN III, IV, VI   Pupils are equal, round, and reactive to light.  Extraocular motions are normal.     CN V   Facial sensation intact.     CN VII   Facial expression full, symmetric.     CN VIII   CN VIII normal.     CN IX, X   CN IX normal.     CN XI   CN XI normal.     Motor Exam   Right arm tone: normal  Left arm tone: normal  Right arm pronator drift: absent  Left arm pronator drift: absent  Right leg tone: normal  Left leg tone: normal    Strength   Right deltoid: 5/5  Left deltoid: 5/5  Right  biceps: 5/5  Left biceps: 5/5  Right triceps: 5/5  Left triceps: 5/5  Right wrist extension: 5/5  Left wrist extension: 5/5  Right iliopsoas: 5/5  Left iliopsoas: 5/5  Right quadriceps: 5/5  Left quadriceps: 5/5  Right anterior tibial: 5/5  Left anterior tibial: 5/5  Right gastroc: 5/5  Left gastroc: 5/5  Right EHL 5/5  Left EHL 5/5       Sensory Exam   Right arm light touch: normal  Left arm light touch: normal  Right leg light touch: normal  Left leg light touch: normal    Gait, Coordination, and Reflexes     Gait  Gait: normal    Tremor   Resting tremor: absent  Intention tremor: absent  Action tremor: absent    Results Review:   Independent review and interpretation of imaging  Imaging Results (Last 24 Hours)     ** No results found for the last 24 hours. **        Lab Results   Component Value Date    WBC 15.32 (H) 03/31/2023    RBC 4.34 03/31/2023    HGB 13.5 03/31/2023    HCT 40.8 03/31/2023     03/31/2023     Lab Results   Component Value Date    GLUCOSE 243 (H) 03/31/2023    CREATININE 1.18 03/31/2023     03/31/2023    K 4.0 03/31/2023    ALT 11 03/31/2023    AST 30 03/31/2023    ALKPHOS 103 03/31/2023    EGFRIFNONA >60.0 10/21/2020     Lab Results   Component Value Date    CRP 14.75 (H) 03/31/2023    CRP 3.90 (H) 01/25/2023    CRP 0.32 12/05/2022    CRP <0.30 09/26/2022    CRP 0.41 07/21/2022    CRP 0.86 (H) 07/19/2022     MRI brain:  MRI spine:   CT Head:  CT c-spine:  CT t-spine:  CT l-spine:  X-ray:    I reviewed the patient's new clinical results.  Lab Results (last 24 hours)     ** No results found for the last 24 hours. **          ADITYA Cespedes          Electronically signed by Flaco Portillo MD at 04/01/23 0823       Emergency Department Notes    No notes of this type exist for this encounter.         Ventilator/Non-Invasive Ventilation Settings (From admission, onward)     Start     Ordered    04/02/23 1501  NIPPV (CPAP or BIPAP)  Until Discontinued        Question  Answer Comment   Indication: Acute Respiratory Failure    Type: BIPAP    IPAP 16    EPAP 8    Breath Rate 20    Titrate Oxygen for SpO2 90% - 95%        04/02/23 1500    04/01/23 2143  NIPPV (CPAP or BIPAP)  Until Discontinued,   Status:  Canceled        Comments: RT to manage   Question Answer Comment   Indication: Acute Respiratory Failure    Type: BIPAP    Titrate Oxygen for SpO2 90% - 95%        04/01/23 2142                   Physician Progress Notes (last 72 hours)      Ramin Singh DO at 04/02/23 1245              [facility] Medicine Services  INPATIENT PROGRESS NOTE    Patient Name: Elijah Escobar  Date of Admission: 3/31/2023  Today's Date: 04/02/23  Length of Stay: 2  Primary Care Physician: Brianna Martinez APRN    Subjective   Chief Complaint: Shortness of breath  HPI     The patient has become increasingly short of breath with greater oxygen requirement than yesterday.  Currently the patient is on BiPAP with FiO2 0.80.  Stat D-dimer was obtained as well as stat BNP.  D-dimer was within normal limits virtually ruling out pulmonary embolus.  BNP was elevated at 10,168.  Patient was diuresed with Lasix IV 40 mg every 12 hours over the past 24 hours with very positive little urinary output noted.  Metolazone 5 mg x 1 dose will be given today.  I discussed the case with Dr. Portillo and with Dr. Samaniego.  Cardiology will be consulted for further evaluation and recommendations.  Culture of purulent material from the patient's area of swelling showed no organisms but 4+ WBCs.  CSF culture shows no growth.  Blood culture also shows no growth at 24 hours.  White blood cell count slightly improved to 17,900 with hemoglobin 11.9.  Sodium 135.  Lactate is improved to 2.1.  Echocardiogram shows ejection fraction reduced from previous echo to 46 to 50% with slight worsening in left ventricular apical hypokinesis.  I spoke with Dr. Portillo regarding delaying surgery until at  least 4/4.  Rate is well controlled with Cardizem drip which will likely need to be continued into the postoperative period.  The patient remains anticoagulated with Lovenox.    Review of Systems   All pertinent negatives and positives are as above. All other systems have been reviewed and are negative unless otherwise stated.     Objective    Temp:  [98 °F (36.7 °C)-100.4 °F (38 °C)] 98.1 °F (36.7 °C)  Heart Rate:  [] 105  Resp:  [20-36] 22  BP: ()/() 130/74  Physical Exam  Constitutional:       Appearance: He is ill-appearing.      Interventions: Face mask in place.   HENT:      Head: Atraumatic.      Comments:  Comments: Right frontotemporal scalp swelling extending to the supraorbital region.     Right Ear: External ear normal.      Left Ear: External ear normal.      Nose: Nose normal.      Mouth/Throat:      Mouth: Mucous membranes are dry.      Pharynx: Oropharynx is clear.   Eyes:      General: No scleral icterus.     Conjunctiva/sclera: Conjunctivae normal.   Cardiovascular:      Rate and Rhythm: Regular rhythm. Tachycardia present.      Pulses: Normal pulses.      Heart sounds: Normal heart sounds.   Pulmonary:      Effort: Pulmonary effort is normal. No respiratory distress.      Breath sounds: Normal breath sounds.   Abdominal:      General: Abdomen is flat. Bowel sounds are normal.      Palpations: Abdomen is soft. There is no mass.      Tenderness: There is no abdominal tenderness.   Musculoskeletal:         General: Normal range of motion.      Right lower leg: Edema present.      Left lower leg: Edema present.   Skin:     General: Skin is warm and dry.   Neurological:      General: No focal deficit present.      Mental Status: He is alert and oriented to person, place, and time. Mental status is at baseline.   Psychiatric:         Mood and Affect: Mood normal.         Judgment: Judgment normal.             Results Review:  I have reviewed the labs, radiology results, and diagnostic  studies.    Laboratory Data:   Results from last 7 days   Lab Units 04/02/23  0613 04/01/23  0435 03/31/23  1020   WBC 10*3/mm3 17.92* 19.05* 15.32*   HEMOGLOBIN g/dL 11.9* 13.3 13.5   HEMATOCRIT % 36.6* 42.2 40.8   PLATELETS 10*3/mm3 192 224 214        Results from last 7 days   Lab Units 04/02/23  0613 04/01/23  0559 04/01/23 0435 03/31/23  1020   SODIUM mmol/L 135* 136 137 138   POTASSIUM mmol/L 4.4 4.5 3.9 4.0   CHLORIDE mmol/L 99 97* 99 100   CO2 mmol/L 15.0* 19.0* 16.0* 23.0   BUN mg/dL 39* 35* 34* 22   CREATININE mg/dL 1.17 1.26 1.20 1.18   CALCIUM mg/dL 8.3* 9.0 8.7 8.9   BILIRUBIN mg/dL  --   --  1.2 1.0   ALK PHOS U/L  --   --  111 103   ALT (SGPT) U/L  --   --  11 11   AST (SGOT) U/L  --   --  28 30   GLUCOSE mg/dL 331* 281* 261* 243*       Culture Data:   Blood Culture   Date Value Ref Range Status   03/31/2023 No growth at 24 hours  Preliminary   03/31/2023 No growth at 24 hours  Preliminary     Urine Culture   Date Value Ref Range Status   03/31/2023 No growth  Final     Wound Culture   Date Value Ref Range Status   04/01/2023 Culture in progress  Preliminary       Radiology Data:   Imaging Results (Last 24 Hours)     ** No results found for the last 24 hours. **          I have reviewed the patient's current medications.     Assessment/Plan   Assessment  Active Hospital Problems    Diagnosis    • **Facial swelling    • Acute pulmonary edema due to A-fib RVR    • Acute respiratory failure with hypoxia    • Paroxysmal atrial fibrillation with rapid ventricular response    • Type 2 diabetes mellitus with hyperglycemia and peripheral neuropathy, with long-term current use of insulin (HCC)    • Coronary artery disease    • Meningioma s/p craniotomy        Treatment Plan  Zaroxolyn 5 mg p.o. x1 dose  Continue Lasix 40 mg IV every 12 hours  Cardiology consultation, case discussed with Dr. Samaniego  Continue to attempt to diurese  Stat D-dimer, done (negative)  Stat BNP, done (10,000)  Case discussed with   Lindsey electronically  Insulin drip for improved glycemic control  Will likely need to continue Cardizem drip for rate control into the postoperative period  Continue Lovenox anticoagulation    Medical Decision Making  Number and Complexity of problems:   1) respiratory failure with hypoxia secondary to pulmonary edema which is secondary to A-fib RVR, acute, moderate complexity  2) PAF with RVR, acute, moderate complexity  3) meningioma versus intracranial infection, acute, high complexity  4) type 2 diabetes, chronic, moderate complexity     Differential Diagnosis: Pulmonary embolus, LV dysfunction     Conditions and Status        Condition is unchanged.     [facility] Data  External documents reviewed: Care everywhere documentation  Cardiac tracing (EKG, telemetry) interpretation: See HPI  Radiology interpretation: See HPI  Labs reviewed: See HPI  Any tests that were considered but not ordered: None     Decision rules/scores evaluated (example [id number], Wells, etc): [id number] score of 5 with a 7.2% risk of stroke annually     Discussed with: The patient and nursing     Care Planning  Shared decision making: The patient  Code status and discussions: Full code     Disposition  Social Determinants of Health that impact treatment or disposition: None  I expect the patient to be discharged by the primary service.     Electronically signed by Ramin Singh DO, 04/02/23, 12:45 CDT.    I spent 45 minutes providing critical care management to this patient. This excludes time spent in performing separately billed procedures.       Electronically signed by Ramin Singh DO at 04/02/23 1304     Flaco Portillo MD at 04/02/23 0947          Neurosurgery Daily Progress Note    HPI:  Elijah Escobar is a 67 y.o. male with a significant medical history of hypertension, diabetes, hyperlipidemia, seizures, craniotomy for tumor (6/16/2022), craniotomy for washout (7/1/2022), craniectomy (10/4/2022), coronary  artery disease, diabetes, erectile dysfunction, GERD, hypertension, hyperlipidemia, tobacco abuse, and obesity.  He presents today with a complaint of a 4-5 days onset of progressively worsening right frontal, temporal, and periorbital edema and associated symptoms to include, but not limited to generalized fatigue, chills, dyspnea, intermittent nausea without vomiting, and states he's felt feverish.  Physical exam findings of neurologically intact with right frontal, temporal, and periorbital swelling.  WBC elevated at 15.  3 to an CRP elevated at 14.75.  Imaging pending.    Assessment:   Past Medical History:   Diagnosis Date   • Brain tumor    • Coronary artery disease    • COVID-19 vaccine series completed     MODERNA X 3; LAST DOSE 3/2022   • Diabetes    • Erectile dysfunction    • GERD (gastroesophageal reflux disease)    • Hypercholesteremia    • Hypertension    • Seizure      Active Hospital Problems    Diagnosis    • **Facial swelling    • Acute pulmonary edema due to A-fib RVR    • Acute respiratory failure with hypoxia    • Paroxysmal atrial fibrillation with rapid ventricular response    • Type 2 diabetes mellitus with hyperglycemia and peripheral neuropathy, with long-term current use of insulin (HCC)    • Coronary artery disease    • Meningioma s/p craniotomy        Plan:   Neuro: Stable, intact  Infected cranial flap   Plan for OR early next week, Monday if cleared by hospitalist   Flap tapped yesterday with delores purulence.  Awaiting cultures.         CV: AFib with RVR and heart strain and trop bump   Stabilized    Appreciate Cards  Pulm: Possible PNA with desats overnight   Vapotherm and CPAP   Abx per hospitalist  :  No patel  FEN: Reg diet for now   Fluid balance for sepsis vs pulmonary edema  Endocrine: SSI with blood glucose in 300s.     Consider Insulin gtt  GI: SHERIF  ID: WBC 17, CRP and ESR elevated, febrile overnight   PNA   Infected cranial flap   UTI with cultures pending.    CSF: no  "growth to date   Cont Zosyn  Heme:  DVT prophylaxis  Pain: well controlled  Dispo: pending removal of cranial flap      Chief complaint:   SHERIF    HPI  Subjective  Doing well    Temp:  [98 °F (36.7 °C)-100.4 °F (38 °C)] 98.7 °F (37.1 °C)  Heart Rate:  [] 86  Resp:  [20-36] 24  BP: ()/() 128/76    Output by Drain (mL) 04/01/23 0701 - 04/01/23 1900 04/01/23 1901 - 04/02/23 0700 04/02/23 0701 - 04/02/23 0948 Range Total   Patient has no LDAs of requested type attached.       Objective:  Vital signs: (most recent): Blood pressure 128/76, pulse 86, temperature 98.7 °F (37.1 °C), temperature source Axillary, resp. rate 24, height 177.8 cm (70\"), weight 119 kg (261 lb 4.8 oz), SpO2 99 %.        Neurologic Exam     Mental Status   Oriented to person, place, and time.   Speech: speech is normal   Level of consciousness: alert    Cranial Nerves     CN III, IV, VI   Pupils are equal, round, and reactive to light.  Extraocular motions are normal.     CN V   Facial sensation intact.     CN VII   Facial expression full, symmetric.     Motor Exam   Right arm pronator drift: absent  Left arm pronator drift: absent    Strength   Right deltoid: 5/5  Left deltoid: 5/5  Right biceps: 5/5  Left biceps: 5/5  Right triceps: 5/5  Left triceps: 5/5  Right iliopsoas: 5/5  Left iliopsoas: 5/5  Right quadriceps: 5/5  Left quadriceps: 5/5  Right gastroc: 5/5  Left gastroc: 5/5    Sensory Exam   Light touch normal.       Drains: * No LDAs found *    Imaging Results (Last 24 Hours)     ** No results found for the last 24 hours. **        Lab Results (last 24 hours)     Procedure Component Value Units Date/Time    Wound Culture - Aspirate, Forehead [[id number]] Collected: 04/01/23 1120    Specimen: Aspirate from Forehead Updated: 04/02/23 0832     Wound Culture Culture in progress     Gram Stain Many (4+) WBCs seen      No organisms seen    POC Glucose Once [[id number]]  (Abnormal) Collected: 04/02/23 0757    Specimen: Blood " Updated: 04/02/23 0808     Glucose 307 mg/dL      Comment: : [id number] Felipe HeatherMeter ID: [id number]       Culture, CSF - Cerebrospinal Fluid, Lumbar Puncture [[id number]] Collected: 03/31/23 1450    Specimen: Cerebrospinal Fluid from Lumbar Puncture Updated: 04/02/23 0721     CSF Culture No growth     Gram Stain No No organisms seen      Occasional WBCs seen    Basic Metabolic Panel [[id number]]  (Abnormal) Collected: 04/02/23 0613    Specimen: Blood Updated: 04/02/23 0641     Glucose 331 mg/dL      BUN 39 mg/dL      Creatinine 1.17 mg/dL      Sodium 135 mmol/L      Potassium 4.4 mmol/L      Comment: Slight hemolysis detected by analyzer. Results may be affected.        Chloride 99 mmol/L      CO2 15.0 mmol/L      Calcium 8.3 mg/dL      BUN/Creatinine Ratio 33.3     Anion Gap 21.0 mmol/L      eGFR 68.3 mL/min/1.73     Narrative:      GFR Normal >60  Chronic Kidney Disease <60  Kidney Failure <15      CBC (No Diff) [[id number]]  (Abnormal) Collected: 04/02/23 0613    Specimen: Blood Updated: 04/02/23 0624     WBC 17.92 10*3/mm3      RBC 3.95 10*6/mm3      Hemoglobin 11.9 g/dL      Hematocrit 36.6 %      MCV 92.7 fL      MCH 30.1 pg      MCHC 32.5 g/dL      RDW 12.2 %      RDW-SD 41.3 fl      MPV 11.7 fL      Platelets 192 10*3/mm3     STAT Lactic Acid, Reflex [[id number]]  (Abnormal) Collected: 04/01/23 2340    Specimen: Blood Updated: 04/02/23 0031     Lactate 2.1 mmol/L     POC Glucose Once [[id number]]  (Abnormal) Collected: 04/01/23 2117    Specimen: Blood Updated: 04/01/23 2128     Glucose 188 mg/dL      Comment: : [id number] Meek KemperMeter ID: [id number]       Blood Gas, Arterial - [[id number]]  (Abnormal) Collected: 04/01/23 2054    Specimen: Arterial Blood Updated: 04/01/23 2053     Site Right Brachial     Marek's Test N/A     pH, Arterial 7.449 pH units      pCO2, Arterial 30.7 mm Hg      Comment: 84 Value below reference range        pO2, Arterial 52.9 mm Hg      Comment: 85 Value below  critical limit        HCO3, Arterial 21.3 mmol/L      Base Excess, Arterial -1.8 mmol/L      Comment: 84 Value below reference range        O2 Saturation, Arterial 88.0 %      Comment: 84 Value below reference range        Temperature 37.0 C      Barometric Pressure for Blood Gas 752 mmHg      Modality Heated HFNC     FIO2 100 %      Flow Rate 40.0 lpm      Ventilator Mode NA     Notified Who HIRAM ZAPATA RN     Notified By [id number]     Notified Time 04/01/2023 20:55     Collected by [id number]     Comment: Meter: [id number]     :  [id number]        pCO2, Temperature Corrected 30.7 mm Hg      pH, Temp Corrected 7.449 pH Units      pO2, Temperature Corrected 52.9 mm Hg     STAT Lactic Acid, Reflex [[id number]]  (Abnormal) Collected: 04/01/23 1809    Specimen: Blood Updated: 04/01/23 1838     Lactate 2.9 mmol/L     POC Glucose Once [[id number]]  (Abnormal) Collected: 04/01/23 1657    Specimen: Blood Updated: 04/01/23 1708     Glucose 247 mg/dL      Comment: : [id number] Erica Gutierrez ID: [id number]       Blood Culture - Blood, Arm, Right [[id number]]  (Normal) Collected: 03/31/23 1251    Specimen: Blood from Arm, Right Updated: 04/01/23 1330     Blood Culture No growth at 24 hours    Blood Culture - Blood, Arm, Left [[id number]]  (Normal) Collected: 03/31/23 1249    Specimen: Blood from Arm, Left Updated: 04/01/23 1330     Blood Culture No growth at 24 hours    AFB Culture - Cerebrospinal Fluid, Lumbar Puncture [[id number]] Collected: 03/31/23 1450    Specimen: Cerebrospinal Fluid from Lumbar Puncture Updated: 04/01/23 1320     AFB Stain No acid fast bacilli seen on direct smear    STAT Lactic Acid, Reflex [[id number]]  (Abnormal) Collected: 04/01/23 1220    Specimen: Blood Updated: 04/01/23 1248     Lactate 2.4 mmol/L     POC Glucose Once [[id number]]  (Abnormal) Collected: 04/01/23 1147    Specimen: Blood Updated: 04/01/23 1157     Glucose 268 mg/dL      Comment: : [id number] Felipe KumarMetdaniel ID:  [id number]       STAT Lactic Acid, Reflex [[id number]]  (Abnormal) Collected: 04/01/23 0918    Specimen: Blood Updated: 04/01/23 0959     Lactate 2.7 mmol/L           [id number]  Flaco Portillo MD      Electronically signed by Flaco Portillo MD at 04/02/23 0955     Ramin Singh DO at 04/01/23 1007              [facility] Medicine Services  INPATIENT PROGRESS NOTE    Patient Name: Elijah Escobar  Date of Admission: 3/31/2023  Today's Date: 04/01/23  Length of Stay: 1  Primary Care Physician: Brianna Martinez APRN    Subjective   Chief Complaint: Shortness of breath  HPI     The patient developed atrial fibrillation with RVR last evening prompting a consultation to the hospitalist service.  The patient was placed on a Cardizem drip.  Oxygen requirement has escalated to Vapotherm at 35 L and FiO2 0.80.  The patient is comfortable at the time of my evaluation.  He remains in atrial fibrillation with a rate of 100.  Chest x-ray is consistent with pulmonary edema likely secondary to atrial fibrillation.  The patient remains on amlodipine in addition to Cardizem drip.  Amlodipine will be discontinued.  The patient is also on IV fluids which will be discontinued.  Lovenox will be initiated for stroke prophylaxis given [id number] score of 5 with a 7.2% annual risk for CVA.  An echocardiogram will be obtained.  A previous echocardiogram was obtained last year for a similar episode of A-fib RVR.  Previous echo showed preserved LV function with 56 to 60% EF with no evidence of right heart failure.  CT angiogram was ordered however the patient is low risk for pulmonary embolus.  Currently there is no indication for CT angiogram given evidence of pulmonary edema on chest x-ray.  CTA previously ordered will be discontinued.  Given recurrence of atrial for with RVR, would recommend chronic anticoagulation with a DOAC at the time of discharge.    Review of Systems   All  pertinent negatives and positives are as above. All other systems have been reviewed and are negative unless otherwise stated.     Objective    Temp:  [97 °F (36.1 °C)-98.5 °F (36.9 °C)] 97.6 °F (36.4 °C)  Heart Rate:  [] 101  Resp:  [0-34] 0  BP: ()/() 136/49  Physical Exam  Constitutional:       Appearance: Normal appearance. He is obese. He is ill-appearing.      Interventions: Nasal cannula in place.      Comments: Vapotherm.   HENT:      Head: Atraumatic.      Comments: Right frontotemporal scalp swelling extending to the supraorbital region.     Right Ear: External ear normal.      Left Ear: External ear normal.      Nose: Nose normal.      Mouth/Throat:      Mouth: Mucous membranes are moist.      Pharynx: Oropharynx is clear.   Eyes:      General: No scleral icterus.     Conjunctiva/sclera: Conjunctivae normal.   Cardiovascular:      Rate and Rhythm: Tachycardia present. Rhythm irregularly irregular.   Neurological:      Mental Status: He is alert.           Results Review:  I have reviewed the labs, radiology results, and diagnostic studies.    Laboratory Data:   Results from last 7 days   Lab Units 04/01/23  0435 03/31/23  1020   WBC 10*3/mm3 19.05* 15.32*   HEMOGLOBIN g/dL 13.3 13.5   HEMATOCRIT % 42.2 40.8   PLATELETS 10*3/mm3 224 214        Results from last 7 days   Lab Units 04/01/23  0559 04/01/23  0435 03/31/23  1020   SODIUM mmol/L 136 137 138   POTASSIUM mmol/L 4.5 3.9 4.0   CHLORIDE mmol/L 97* 99 100   CO2 mmol/L 19.0* 16.0* 23.0   BUN mg/dL 35* 34* 22   CREATININE mg/dL 1.26 1.20 1.18   CALCIUM mg/dL 9.0 8.7 8.9   BILIRUBIN mg/dL  --  1.2 1.0   ALK PHOS U/L  --  111 103   ALT (SGPT) U/L  --  11 11   AST (SGOT) U/L  --  28 30   GLUCOSE mg/dL 281* 261* 243*       Culture Data:   No results found for: BLOODCX, URINECX, WOUNDCX, MRSACX, RESPCX, STOOLCX    Radiology Data:   Imaging Results (Last 24 Hours)       Procedure Component Value Units Date/Time    XR Chest 1 View [[id number]]  Collected: 04/01/23 0636     Updated: 04/01/23 0641    Narrative:      EXAM/TECHNIQUE: XR CHEST 1 VW-     INDICATION: respiratory distress     COMPARISON: 03/31/2023     FINDINGS:     Cardiac silhouette is within normal limits and stable. No pleural  effusion or visible pneumothorax. Central vascular congestion with  diffuse interstitial opacity, similar to prior exam. No acute osseous  finding.       Impression:         No change in appearance of the chest. Persistent central vascular  congestion with mild diffuse interstitial opacity, which may be related  to mild volume overload/pulmonary edema.  This report was finalized on 04/01/2023 06:38 by Dr. Mk Chinchilla MD.    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 Patient refuses additional imaging. No IV contrast  administered as patient unable to tolerate exam     COMPARISON: CT head 03/31/2023     FINDINGS: Postoperative changes from right frontal cranioplasty. There  is fluid attenuated signal seen superficial to the cranioplasty flap  within the scalp, increased from the postoperative 10/04/2022 CT head  exam, concerning for pseudomeningocele. There is questionable subdural  hemorrhage seen deep to the cranioplasty flap, however T2 gradient  sequences are not obtained. There is increased FLAIR signal within the  underlying right frontal lobe parenchyma. There is no midline shifting.  There is mild generalized volume loss. No intra-axial mass is  identified.       Impression:      1. Limited exam. Patient is able to tolerate only axial diffusion  weighted, FLAIR, T1 sequences with a single sagittal T1 sequence. No  additional imaging or contrast administered due to patient unable to  tolerate study.  2. Right frontal cranioplasty changes.  Increasing fluid collection  superficial to the cranioplasty flap within the scalp concerning for  pseudomeningocele. Questionable small subdural hemorrhage deep to the  cranioplasty site. Mild hyperintense FLAIR signal noted within the  underlying right frontal lobe parenchyma.  This report was finalized on 03/31/2023 17:20 by Dr. Coleen Terry MD.    CT Head Without Contrast [[id number]] Collected: 03/31/23 1540     Updated: 03/31/23 1547    Narrative:      EXAMINATION: CT HEAD WO CONTRAST-      3/31/2023 3:16 PM CDT     HISTORY: Pseudomeningocele concern for postcraniotomy infection     In order to have a CT radiation dose as low as reasonably achievable  Automated Exposure Control was utilized for adjustment of the mA and/or  KV according to patient size.     DLP in mGycm= 413.     Right frontal craniectomy.     Large right frontal scalp fluid collection measuring approximately 8 cm  in diameter and 1.8 cm in thickness.  There is a 5 mm rim of low density fluid deep to the synthetic catheter  placed at the right frontal craniectomy site.  Deep to this rim of fluid is a 7 mm thick rim of acute hemorrhage.     There is no significant mass effect.  Mild right frontal white matter edema.     Normal ventricle size.  No parenchymal hemorrhage or acute infarct.  No midline shift.     Summary:  1. Postsurgical changes with right frontal scalp fluid collection and a  small amount of right frontal ventricular cranial hemorrhage. No midline  shift or large intracranial hematoma.                                   This report was finalized on 03/31/2023 15:44 by Dr. Ghulam Cano MD.    IR LUMBAR PUNCTURE DIAGNOSTIC [[id number]] Collected: 03/31/23 1528     Updated: 03/31/23 1535    Narrative:      EXAMINATION: IR LUMBAR PUNCTURE DIAGNOSTIC-     3/31/2023 2:33 PM CDT     HISTORY: Pseudomeningocele concern for postoperative intracranial  infection     The procedure was explained to the patient. The benefits, and the risk  and  complications were discussed. The patient understood the procedure  and signed the consent.     The patient was positioned on the fluoroscopy table in prone position  and lumbar spine was examined. A suitable spot opposite space L1-2 was  selected for puncture. The spot was marked with an ink marker.     After sterile preparation and application of local anesthesia a size  20-gauge spinal needle was introduced into the thecal sac and a free  flow of clear colorless thin CSF was established. The outer hub of the  spinal needle was attached to a pressure measuring device and opening  pressure was measured. The opening pressure is 23 cm water. Subsequently  27 mL of CSF was removed and and displaced and 4 separate test tubes  which were sent to the laboratory for further evaluation as instructed  by the attending physician. The needle was then withdrawn after  replacing the stylus. The puncture site was covered with a sterile  Band-Aid.     Patient tolerated the procedure well. No immediate complications were  noted.     The postprocedure care instructions were given to the patient.       Impression:      1. A successful lumbar puncture and opening and closing pressure  measurements.  2. Fluoroscopy time: 1 minute 14 seconds.  3. The dose: 101mGy.  4. Number of images: 1  This report was finalized on 03/31/2023 15:32 by Dr. Michelle Vigil MD.    XR Chest PA & Lateral [[id number]] Collected: 03/31/23 1452     Updated: 03/31/23 1458    Narrative:      HISTORY: Dyspnea     CXR: 2 views the chest are obtained.     COMPARISON: 09/26/2022     FINDINGS: There is an increasing prominence of the interstitial markings  diffusely. Anterior upper lobe consolidation on the lateral view is not  identified of the frontal exam and most likely secondary to soft tissue  artifact/attenuation. The heart is upper limits of normal in size. No  pleural effusion or pneumothorax identified. No acute regional bony  pathology. Chronic  calcifications adjacent to the bilateral shoulders.       Impression:      1. Increasing interstitial densities may relate to mild edema/volume  overload. Pneumonitis considered. Soft tissue attenuation/artifact  suspected on the lateral view.  This report was finalized on 03/31/2023 14:55 by Dr. Coleen Terry MD.            I have reviewed the patient's current medications.     Assessment/Plan   Assessment  Active Hospital Problems    Diagnosis    • **Facial swelling    • Acute pulmonary edema due to A-fib RVR    • Acute respiratory failure with hypoxia    • Paroxysmal atrial fibrillation with rapid ventricular response    • Type 2 diabetes mellitus with hyperglycemia and peripheral neuropathy, with long-term current use of insulin (HCC)    • Coronary artery disease    • Meningioma s/p craniotomy        Treatment Plan  Discontinue IV fluids  Lasix 40 mg IV every 12 hours  Echocardiogram  Discontinue amlodipine  Lovenox full anticoagulation dosed per pharmacy  Discontinue CT angiogram  Continue Cardizem drip for rate control  Attempt to de-escalate oxygen  Add vancomycin to antibiotic regimen with pharmacy to dose    Medical Decision Making  Number and Complexity of problems:   1) respiratory failure with hypoxia secondary to pulmonary edema which is secondary to A-fib RVR, acute, moderate complexity  2) PAF with RVR, acute, moderate complexity  3) meningioma versus intracranial infection, acute, high complexity  4) type 2 diabetes, chronic, moderate complexity    Differential Diagnosis: Pulmonary embolus, LV dysfunction    Conditions and Status        Condition is unchanged.     [facility] Data  External documents reviewed: Care everywhere documentation  Cardiac tracing (EKG, telemetry) interpretation: See HPI  Radiology interpretation: See HPI  Labs reviewed: See HPI  Any tests that were considered but not ordered: None     Decision rules/scores evaluated (example [id number], Wells, etc): [id number] score of 5 with  a 7.2% risk of stroke annually     Discussed with: The patient and nursing     Care Planning  Shared decision making: The patient  Code status and discussions: Full code    Disposition  Social Determinants of Health that impact treatment or disposition: None  I expect the patient to be discharged by the primary service.     Electronically signed by Ramin Singh DO, 04/01/23, 10:25 CDT.      Electronically signed by Ramin Singh DO at 04/01/23 1158     Flaco Portillo MD at 04/01/23 0823          Neurosurgery Daily Progress Note    HPI:  Elijah Escobar is a 67 y.o. male with a significant medical history of hypertension, diabetes, hyperlipidemia, seizures, craniotomy for tumor (6/16/2022), craniotomy for washout (7/1/2022), craniectomy (10/4/2022), coronary artery disease, diabetes, erectile dysfunction, GERD, hypertension, hyperlipidemia, tobacco abuse, and obesity.  He presents today with a complaint of a 4-5 days onset of progressively worsening right frontal, temporal, and periorbital edema and associated symptoms to include, but not limited to generalized fatigue, chills, dyspnea, intermittent nausea without vomiting, and states he's felt feverish.  Physical exam findings of neurologically intact with right frontal, temporal, and periorbital swelling.  WBC elevated at 15.  3 to an CRP elevated at 14.75.  Imaging pending.    Assessment:   Past Medical History:   Diagnosis Date   • Brain tumor    • Coronary artery disease    • COVID-19 vaccine series completed     MODERNA X 3; LAST DOSE 3/2022   • Diabetes    • Erectile dysfunction    • GERD (gastroesophageal reflux disease)    • Hypercholesteremia    • Hypertension    • Seizure      Active Hospital Problems    Diagnosis    • **Facial swelling        Plan:   Neuro: Stable, intact  Infected cranial flap   Plan for OR early next week         CV: AFib with RVR and heart strain   Stabilized overnight   Cards today  Pulm: Possible PNA with  desats overnight   Vapotherm   Abx per hospitalist  :  No patel  FEN: Reg diet for now  Endocrine: SSI  GI: SHERIF  ID: Elevated WBC, CRP and ESR   PNA and possibly infected cranial flap   UTI with cultures pending.    Cont Zosyn  Heme:  DVT prophylaxis  Pain: well controlled  Dispo: pending removal of cranial flap      Chief complaint:   SHERIF    HPI  Subjective  Doing well    Temp:  [97 °F (36.1 °C)-98.5 °F (36.9 °C)] 97 °F (36.1 °C)  Heart Rate:  [] 113  Resp:  [0-34] 0  BP: ()/() 121/84    Output by Drain (mL) 03/31/23 0701 - 03/31/23 1900 03/31/23 1901 - 04/01/23 0700 04/01/23 0701 - 04/01/23 0824 Range Total   Patient has no LDAs of requested type attached.       Objective    Neurologic Exam     Mental Status   Oriented to person, place, and time.   Speech: speech is normal   Level of consciousness: alert    Cranial Nerves     CN III, IV, VI   Pupils are equal, round, and reactive to light.  Extraocular motions are normal.     CN V   Facial sensation intact.     CN VII   Facial expression full, symmetric.     Motor Exam   Right arm pronator drift: absent  Left arm pronator drift: absent    Strength   Right deltoid: 5/5  Left deltoid: 5/5  Right biceps: 5/5  Left biceps: 5/5  Right triceps: 5/5  Left triceps: 5/5  Right iliopsoas: 5/5  Left iliopsoas: 5/5  Right quadriceps: 5/5  Left quadriceps: 5/5  Right gastroc: 5/5  Left gastroc: 5/5    Sensory Exam   Light touch normal.       Drains: * No LDAs found *    [id number]  Flaco Portillo MD      Electronically signed by Flaco Portillo MD at 04/01/23 0835          Consult Notes (last 72 hours)      Tracy Gupta MD at 04/02/23 2049      Consult Orders    1. Inpatient Pulmonology Consult [[id number]] ordered by Ramin Singh DO at 04/02/23 1409                     [facility] [place] [place]S[facility] - [facility]    04/02/23, 15:51 CDT  Patient Care Team:  Brianna Martinez APRN as PCP - General (Nurse  Practitioner)  Rahul Arnold APRN as Nurse Practitioner (Nurse Practitioner)  Flaco Portillo MD as Surgeon (Neurosurgery)  Name: Elijah Escobar  : 1955  MRN: [id number]  Contact Serial Number [id number]    Chief complaint: Respiratory failure, shortness of breath, bilateral lung infiltrate, congestive diastolic heart failure, atrial fibrillation COPD, tobacco abuse, obesity, possible obstructive sleep apnea    HPI:  We have been consulted by Flaco Portillo, * to see this 67 y.o. male.  Patient is a middle-aged -American gentleman who was seen as a pulmonary consult in the intensive care unit today.    He had multiple hospitalizations in [facility] since last year.  He had a history of brain tumor and had a craniotomy done in 2022 and he had multiple interventions after the surgery including craniotomy washout craniectomy and still had a flap in place.  He also has hypertension hyperlipidemia, seizure disorder, diabetes mellitus, hyperlipidemia, erectile dysfunction, tobacco abuse and morbid obesity.  Patient this time was admitted under Dr. Portillo 2 days ago with worsening frontal temporal and periorbital edema on the right side and associated fever chills and shortness of breath with intermittent nausea without vomiting.  His physical examination showed no neurologic abnormality.  White cell count was elevated at 15.3 and a CRP was elevated 14.75.  His procalcitonin was 0.78 and hemoglobin A1c was 11.0.  Lactate was 2.1.  He had high-sensitivity troponin elevated to 409 and proBNP was also elevated to 10,168.  His lab work also showed leukocytosis with worsening white cell count 17.92.    Hospitalist team and cardiology has been consulted.  Patient was seen by Dr. Singh.  Patient is currently he is on Cardizem drip which is changed to oral Cardizem by Dr. Quinones.  His blood gas shows hypoxia with PO2 of 52.2.  He was placed on BiPAP with a setting of 16/8 with rate  of 20 and FiO2 80%.  His oxygen saturation maintained in the upper 90s but when the BiPAP is taken off and he was placed on Vapotherm with 35 L flow and 80% FiO2 he felt short of breath and wanted his BiPAP back on.    Dr. Singh talked to cardiologist Dr. Quinones and he feels the patient's shortness of breath is not due to cardiac issues but it could be from pulmonary issues.  Pulmonary consult requested for further assessment.  His chest x-ray reviewed showed cardiomegaly and bilateral pulmonary infiltrates.  The patient is already started on Zosyn and vancomycin for possible pneumonia.  Pulmonary team was consulted for further management.  Patient told me he was an active smoker but quit smoking 3 weeks ago.  He is a  but did not have any inhalers or oxygen at home and did not use any CPAP antibiotic pulmonary function test or sleep study done.  He is nonvaccinated for COVID or influenza.    Past Medical History:   has a past medical history of Brain tumor, Coronary artery disease, COVID-19 vaccine series completed, Diabetes, Erectile dysfunction, GERD (gastroesophageal reflux disease), Hypercholesteremia, Hypertension, and Seizure.   has a past surgical history that includes Colonoscopy; Balloon angioplasty, artery (Right); Craniotomy for Tumor (Right, 6/16/2022); Craniectomy (Right, 7/1/2022); and Cranioplasty (Right, 10/4/2022).  No Known Allergies  Medications:  aspirin, 81 mg, Oral, Daily  atorvastatin, 20 mg, Oral, Nightly  budesonide-formoterol, 2 puff, Inhalation, BID - RT  dilTIAZem, 60 mg, Oral, Q6H  enoxaparin, 1 mg/kg, Subcutaneous, Q12H  furosemide, 40 mg, Intravenous, Q12H  gabapentin, 300 mg, Oral, TID  gadobenate dimeglumine, 20 mL, Intravenous, Once in imaging  ipratropium-albuterol, 3 mL, Nebulization, 4x Daily - RT  levETIRAcetam, 500 mg, Oral, BID  methylPREDNISolone sodium succinate, 60 mg, Intravenous, Q8H  metoprolol tartrate, 50 mg, Oral, Q12H  pantoprazole, 40 mg, Oral, Q  AM  piperacillin-tazobactam, 4.5 g, Intravenous, Q8H  polyethylene glycol, 17 g, Oral, Daily  sennosides-docusate, 1 tablet, Oral, Daily  sodium chloride, 10 mL, Intravenous, Q12H  sodium chloride, 10 mL, Intravenous, Q12H  vancomycin, 1,000 mg, Intravenous, Q12H      dilTIAZem, 5-15 mg/hr, Last Rate: 5 mg/hr (04/02/23 0985)  insulin, 0-100 Units/hr, Last Rate: 6.8 Units/hr (04/02/23 1535)  Pharmacy to Dose enoxaparin (LOVENOX),       Family History:  Family History   Problem Relation Age of Onset   • Cancer Mother         liver   • Heart disease Father      Social History:   reports that he has quit smoking. His smoking use included cigarettes. He smoked an average of .25 packs per day. He does not have any smokeless tobacco history on file. He reports that he does not drink alcohol and does not use drugs.  Review of Systems:  Review of Systems   Constitutional: Positive for chills, fatigue and fever.   HENT: Positive for congestion, rhinorrhea and sinus pressure.    Eyes: Negative.    Respiratory: Positive for cough, chest tightness and shortness of breath.    Cardiovascular: Positive for palpitations and leg swelling. Negative for chest pain.   Gastrointestinal: Negative.    Endocrine: Negative.    Genitourinary: Negative.    Musculoskeletal: Positive for arthralgias and back pain.   Skin: Negative.    Allergic/Immunologic: Positive for environmental allergies.   Neurological: Positive for seizures and weakness.   Hematological: Negative.    Psychiatric/Behavioral: Negative.       Physical Exam:  Temp:  [98 °F (36.7 °C)-100.4 °F (38 °C)] 98.1 °F (36.7 °C)  Heart Rate:  [] 110  Resp:  [22-36] 26  BP: ()/() 110/98    Intake/Output Summary (Last 24 hours) at 4/2/2023 1551  Last data filed at 4/2/2023 1142  Gross per 24 hour   Intake 697.6 ml   Output 350 ml   Net 347.6 ml         04/01/23  1617 04/02/23  0515   Weight: 117 kg (259 lb) 119 kg (261 lb 4.8 oz)     SpO2 Percentage    04/02/23 1457  04/02/23 1509 04/02/23 1515   SpO2: 92% 100% 100%     Body mass index is 37.49 kg/m².   Physical Exam  Constitutional:       General: He is not in acute distress.     Appearance: He is obese. He is ill-appearing.      Comments: Obese middle-aged -American gentleman lying in the bed on BiPAP   HENT:      Head: Normocephalic and atraumatic.      Right Ear: Tympanic membrane normal.      Left Ear: Tympanic membrane normal. There is no impacted cerumen.      Nose: Nose normal. No congestion or rhinorrhea.      Mouth/Throat:      Mouth: Mucous membranes are moist.      Pharynx: Oropharynx is clear. No oropharyngeal exudate or posterior oropharyngeal erythema.   Eyes:      General: No scleral icterus.        Right eye: No discharge.         Left eye: No discharge.      Extraocular Movements: Extraocular movements intact.      Conjunctiva/sclera: Conjunctivae normal.      Pupils: Pupils are equal, round, and reactive to light.   Neck:      Vascular: No carotid bruit.   Cardiovascular:      Rate and Rhythm: Tachycardia present. Rhythm irregular.      Pulses: Normal pulses.      Heart sounds: Normal heart sounds. No murmur heard.    No gallop.   Pulmonary:      Effort: Pulmonary effort is normal. No respiratory distress.      Breath sounds: Wheezing and rales present.      Comments: Decreased breath sound both lungs  Abdominal:      General: Abdomen is flat. Bowel sounds are normal. There is no distension.      Palpations: Abdomen is soft. There is no mass.      Tenderness: There is no abdominal tenderness. There is no right CVA tenderness, left CVA tenderness, guarding or rebound.   Genitourinary:     Comments: Not examined.  Musculoskeletal:         General: No swelling, tenderness, deformity or signs of injury.      Cervical back: Normal range of motion and neck supple. No rigidity or tenderness.      Right lower leg: No edema.   Lymphadenopathy:      Cervical: No cervical adenopathy.   Skin:     General: Skin is  warm and dry.      Capillary Refill: Capillary refill takes less than 2 seconds.      Findings: No bruising, erythema, lesion or rash.   Neurological:      General: No focal deficit present.      Mental Status: He is alert and oriented to person, place, and time.      Cranial Nerves: No cranial nerve deficit.      Sensory: No sensory deficit.      Motor: Weakness present.      Deep Tendon Reflexes: Reflexes normal.   Psychiatric:         Mood and Affect: Mood normal.         Behavior: Behavior normal.         Thought Content: Thought content normal.       Result Review  Results from last 7 days   Lab Units 04/02/23  0613 04/01/23  0435 03/31/23  1020   WBC 10*3/mm3 17.92* 19.05* 15.32*   HEMOGLOBIN g/dL 11.9* 13.3 13.5   PLATELETS 10*3/mm3 192 224 214     Results from last 7 days   Lab Units 04/02/23  0613 04/01/23  0559 04/01/23  0435   SODIUM mmol/L 135* 136 137   POTASSIUM mmol/L 4.4 4.5 3.9   CO2 mmol/L 15.0* 19.0* 16.0*   BUN mg/dL 39* 35* 34*   CREATININE mg/dL 1.17 1.26 1.20   GLUCOSE mg/dL 331* 281* 261*     Results from last 7 days   Lab Units 04/02/23  1455 04/01/23  2054 04/01/23  0549   PH, ARTERIAL pH units 7.503* 7.449 7.341*   PCO2, ARTERIAL mm Hg 28.3* 30.7* 32.0*   PO2 ART mm Hg 52.2* 52.9* 95.4   FIO2 % 80 100 100     Microbiology Results (last 10 days)     Procedure Component Value - Date/Time    Wound Culture - Aspirate, Forehead [[id number]] Collected: 04/01/23 1120    Lab Status: Preliminary result Specimen: Aspirate from Forehead Updated: 04/02/23 0832     Wound Culture Culture in progress     Gram Stain Many (4+) WBCs seen      No organisms seen    Urine Culture - Urine, Urine, Clean Catch [[id number]]  (Normal) Collected: 03/31/23 2346    Lab Status: Final result Specimen: Urine, Clean Catch Updated: 04/02/23 1051     Urine Culture No growth    AFB Culture - Cerebrospinal Fluid, Lumbar Puncture [[id number]] Collected: 03/31/23 1450    Lab Status: Preliminary result Specimen: Cerebrospinal Fluid  from Lumbar Puncture Updated: 04/01/23 1320     AFB Stain No acid fast bacilli seen on direct smear    Culture, CSF - Cerebrospinal Fluid, Lumbar Puncture [[id number]] Collected: 03/31/23 1450    Lab Status: Preliminary result Specimen: Cerebrospinal Fluid from Lumbar Puncture Updated: 04/02/23 0721     CSF Culture No growth     Gram Stain No No organisms seen      Occasional WBCs seen    Blood Culture - Blood, Arm, Right [[id number]]  (Normal) Collected: 03/31/23 1251    Lab Status: Preliminary result Specimen: Blood from Arm, Right Updated: 04/02/23 1330     Blood Culture No growth at 2 days    Blood Culture - Blood, Arm, Left [[id number]]  (Normal) Collected: 03/31/23 1249    Lab Status: Preliminary result Specimen: Blood from Arm, Left Updated: 04/02/23 1330     Blood Culture No growth at 2 days        Recent radiology:   Imaging Results (Last 72 Hours)     Procedure Component Value Units Date/Time    XR Chest 1 View [[id number]] Collected: 04/01/23 0636     Updated: 04/01/23 0641    Narrative:      EXAM/TECHNIQUE: XR CHEST 1 VW-     INDICATION: respiratory distress     COMPARISON: 03/31/2023     FINDINGS:     Cardiac silhouette is within normal limits and stable. No pleural  effusion or visible pneumothorax. Central vascular congestion with  diffuse interstitial opacity, similar to prior exam. No acute osseous  finding.       Impression:         No change in appearance of the chest. Persistent central vascular  congestion with mild diffuse interstitial opacity, which may be related  to mild volume overload/pulmonary edema.  This report was finalized on 04/01/2023 06:38 by Dr. Mk Chinchilla MD.    MRI Brain Without Contrast [[id number]] Collected: 03/31/23 1708     Updated: 03/31/23 1723    Narrative:      HISTORY: Pseudomeningocele, 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 Patient refuses additional imaging. No IV  contrast  administered as patient unable to tolerate exam     COMPARISON: CT head 03/31/2023     FINDINGS: Postoperative changes from right frontal cranioplasty. There  is fluid attenuated signal seen superficial to the cranioplasty flap  within the scalp, increased from the postoperative 10/04/2022 CT head  exam, concerning for pseudomeningocele. There is questionable subdural  hemorrhage seen deep to the cranioplasty flap, however T2 gradient  sequences are not obtained. There is increased FLAIR signal within the  underlying right frontal lobe parenchyma. There is no midline shifting.  There is mild generalized volume loss. No intra-axial mass is  identified.       Impression:      1. Limited exam. Patient is able to tolerate only axial diffusion  weighted, FLAIR, T1 sequences with a single sagittal T1 sequence. No  additional imaging or contrast administered due to patient unable to  tolerate study.  2. Right frontal cranioplasty changes. Increasing fluid collection  superficial to the cranioplasty flap within the scalp concerning for  pseudomeningocele. Questionable small subdural hemorrhage deep to the  cranioplasty site. Mild hyperintense FLAIR signal noted within the  underlying right frontal lobe parenchyma.  This report was finalized on 03/31/2023 17:20 by Dr. Coleen Terry MD.    CT Head Without Contrast [[id number]] Collected: 03/31/23 1540     Updated: 03/31/23 1547    Narrative:      EXAMINATION: CT HEAD WO CONTRAST-      3/31/2023 3:16 PM CDT     HISTORY: Pseudomeningocele concern for postcraniotomy infection     In order to have a CT radiation dose as low as reasonably achievable  Automated Exposure Control was utilized for adjustment of the mA and/or  KV according to patient size.     DLP in mGycm= 413.     Right frontal craniectomy.     Large right frontal scalp fluid collection measuring approximately 8 cm  in diameter and 1.8 cm in thickness.  There is a 5 mm rim of low density fluid deep to the  synthetic catheter  placed at the right frontal craniectomy site.  Deep to this rim of fluid is a 7 mm thick rim of acute hemorrhage.     There is no significant mass effect.  Mild right frontal white matter edema.     Normal ventricle size.  No parenchymal hemorrhage or acute infarct.  No midline shift.     Summary:  1. Postsurgical changes with right frontal scalp fluid collection and a  small amount of right frontal ventricular cranial hemorrhage. No midline  shift or large intracranial hematoma.                                   This report was finalized on 03/31/2023 15:44 by Dr. Ghulam Cano MD.    IR LUMBAR PUNCTURE DIAGNOSTIC [[id number]] Collected: 03/31/23 1528     Updated: 03/31/23 1535    Narrative:      EXAMINATION: IR LUMBAR PUNCTURE DIAGNOSTIC-     3/31/2023 2:33 PM CDT     HISTORY: Pseudomeningocele concern for postoperative intracranial  infection     The procedure was explained to the patient. The benefits, and the risk  and complications were discussed. The patient understood the procedure  and signed the consent.     The patient was positioned on the fluoroscopy table in prone position  and lumbar spine was examined. A suitable spot opposite space L1-2 was  selected for puncture. The spot was marked with an ink marker.     After sterile preparation and application of local anesthesia a size  20-gauge spinal needle was introduced into the thecal sac and a free  flow of clear colorless thin CSF was established. The outer hub of the  spinal needle was attached to a pressure measuring device and opening  pressure was measured. The opening pressure is 23 cm water. Subsequently  27 mL of CSF was removed and and displaced and 4 separate test tubes  which were sent to the laboratory for further evaluation as instructed  by the attending physician. The needle was then withdrawn after  replacing the stylus. The puncture site was covered with a sterile  Band-Aid.     Patient tolerated the procedure well. No  immediate complications were  noted.     The postprocedure care instructions were given to the patient.       Impression:      1. A successful lumbar puncture and opening and closing pressure  measurements.  2. Fluoroscopy time: 1 minute 14 seconds.  3. The dose: 101mGy.  4. Number of images: 1  This report was finalized on 03/31/2023 15:32 by Dr. Michelle Vigil MD.    XR Chest PA & Lateral [[id number]] Collected: 03/31/23 1452     Updated: 03/31/23 1458    Narrative:      HISTORY: Dyspnea     CXR: 2 views the chest are obtained.     COMPARISON: 09/26/2022     FINDINGS: There is an increasing prominence of the interstitial markings  diffusely. Anterior upper lobe consolidation on the lateral view is not  identified of the frontal exam and most likely secondary to soft tissue  artifact/attenuation. The heart is upper limits of normal in size. No  pleural effusion or pneumothorax identified. No acute regional bony  pathology. Chronic calcifications adjacent to the bilateral shoulders.       Impression:      1. Increasing interstitial densities may relate to mild edema/volume  overload. Pneumonitis considered. Soft tissue attenuation/artifact  suspected on the lateral view.  This report was finalized on 03/31/2023 14:55 by Dr. Coleen Terry MD.        Personal review of imaging: CXR shows Reviewed chest x-ray March 31st and one x-ray from today which shows some cardiomegaly with bilateral pulm infiltrate  Other test results (not lab or imaging): Results for orders placed during the hospital encounter of 03/31/23    Adult Transthoracic Echo Complete W/ Cont if Necessary Per Protocol    Interpretation Summary  •  Left ventricular systolic function is mildly decreased. Left ventricular ejection fraction appears to be 46 - 50%.  •  Left ventricular wall thickness is consistent with mild to moderate concentric hypertrophy.  •  The following left ventricular wall segments are hypokinetic: apical anterior, apical lateral,  apical inferior, apical septal, apex hypokinetic and mid anteroseptal.  •  Left atrial volume is mildly increased.  •  Estimated right ventricular systolic pressure from tricuspid regurgitation is mildly elevated (35-45 mmHg).  •  Normal size and function the right ventricle.  •  No significant (greater than mild) valvular pathology.  •  Mild dilation of the aortic root is present.  •  Compared to prior exam from 6/19/2022, both studies were technically difficult, but upon my independent review of the previous exam, there did appear to be some mild apical hypokinesis present on that study that does look slightly more prominent on current exam.  Reviewed  Independent review of ekg: Done.  Atrial fibrillation noted  Problem List as identified by Epic (may contain historical, inactive problems)    Facial swelling    Meningioma s/p craniotomy    Coronary artery disease    Type 2 diabetes mellitus with hyperglycemia and peripheral neuropathy, with long-term current use of insulin (HCC)    Paroxysmal atrial fibrillation with rapid ventricular response    Acute pulmonary edema due to A-fib RVR    Acute respiratory failure with hypoxia    Myocardial injury    Pulmonary Assessment:    1. Acute on chronic hypoxic respiratory failure  2. Bilateral pulm infiltrate with leukocytosis  3. Community-acquired pneumonia versus interstitial pneumonitis  4. Pulmonary edema and volume overload  5. Chronic congestive diastolic heart failure  6. Atrial fibrillation  7. COPD with history of tobacco abuse  8. Meningioma s/p craniotomy.    9. S/p washout craniotomy and cranioplasty with flap infection  10. Hypertension  11. Diabetes mellitus insulin-dependent  12. Coronary artery disease  13. Obesity likely obstructive sleep apnea  14. Seizure disorder  15. Chronic pain    Recommend/plan:   · I reviewed the chest x-ray.  It showed borderline cardiomegaly and bilateral pulm infiltrate  · He also has leukocytosis which could be from the  infection from craniotomy flap or from pneumonia  · Bilateral pulm infiltrate could be from pneumonia but pulmonary edema or interstitial lung disease could not be ruled out  · He is already getting diuresis.  Interstitial lung disease is a possibility with pneumonitis.  · He also has a history of tobacco use and possibly a COPD.  I started the patient on Symbicort and DuoNeb and started him on IV Solu-Medrol.  · I ordered a noncontrast CT scan of the chest for further evaluation for possible underlying interstitial lung disease.   · Patient is not hypercapnic but has hypoxia.    · He will be kept on BiPAP overnight and repeat blood gas ordered for tomorrow morning.  Case discussed with respiratory therapist Nehemias  · Management of atrial fibrillation per cardiology.  He was on Cardizem drip and it is getting switch to oral Cardizem  · Patient is started on Zosyn and vancomycin which could be continued.  · I ordered a COVID screening for the patient.  Apparently he is unvaccinated for COVID as he told me.  · We will also get a viral panel urine for Legionella pneumococcal antigen.  His BNP is high.  · He also has elevated CRP which later became normal but his D-dimer is normal  · I also ordered a ANCA and antinuclear factor for further work-up of possible interstitial lung involvement  · Blood pressure and glycemic control prior primary care team.  He is already on insulin for his diabetes.  · He will need tighter glycemic control after he was started on Solu-Medrol.  His hemoglobin A1c is already high  · He will need to quit smoking.  He will need further work-up in the pulmonary clinic with a PFT and sleep study for possible sleep apnea.  · DVT and stress ulcer prophylaxis and pain and anxiety control.  · CODE STATUS: Full.  Overall prognosis: Guarded  · We appreciate the consult and we will follow.  · Total time spent in seeing this patient as pulmonary consult was 45 minutes    Thank you for this consult.  We  will follow along.    Electronically signed by     Tracy Gupta MD,  Pulmonologist/Intensivist   04/02/23, 3:51 PM CDT.        Electronically signed by Tracy Gupta MD at 04/02/23 1632     Jaxon Samaniego MD at 04/02/23 1320      Consult Orders    1. Inpatient Cardiology Consult [[id number]] ordered by Ramin Singh DO at 04/02/23 1003                 [facility] CONSULT NOTE    Patient Care Team:  Brianna Martinez APRN as PCP - General (Nurse Practitioner)  Rahul Arnold APRN as Nurse Practitioner (Nurse Practitioner)  Flaco Portillo MD as Surgeon (Neurosurgery)  Flaco Portillo, *  REASON FOR REFERRAL: Atrial fibrillation, elevated troponin  Chief complaint: Facial swelling    Subjective     Patient is a 67 y.o. male who has been admitted here multiple times since last summer, after initial surgery for a brain tumor.  It has been noted in prior records, that I reviewed extensively today, that he has coronary disease reportedly including a prior placement of a stent to the right coronary artery.  Atrial fibrillation is also been loaded listed in his history and observed throughout his prior stays here.  He has been off and on anticoagulation and antiarrhythmic therapy, given different circumstances each admission.  He came back to the ER now a couple days ago with worsening facial swelling.    His oxygen saturation were 92% on room air upon arrival, around 2 AM on 4/1 it appears he was started on nasal cannula that was subsequently escalated to high flow    Thus far, he is receiving therapeutic dose of anticoagulation (120 mg Lovenox 12 hours).  He received a 250 mg microgram IV bolus of digoxin yesterday morning at 451.  He has been receiving Lopressor 50 mg every 12 hours (home medication), and 40 mg of IV Lasix twice yesterday and again once this morning at 930.    Patient tells me he in fact does not have any coronary artery disease, but the stent placed  was actually 2 vessel in his leg.  He is familiar of atrial fibrillation, stating that he was initially diagnosed a few years ago in [place] and has been on sotalol since that time.  Even though the discharge summary from last summer here stated he was post to stop it, he states he has been on it the whole time.  He says he is never seen a cardiologist as an outpatient.    Review of Systems   Review of Systems   Constitutional: Negative for appetite change, chills, fever and unexpected weight change.   HENT:        +facial swelling   Eyes: Negative.    Respiratory: Positive for cough and shortness of breath. Negative for wheezing.    Cardiovascular: Negative for chest pain, palpitations and leg swelling.   Gastrointestinal: Negative for abdominal pain and blood in stool.   Endocrine: Negative.    Genitourinary: Negative for flank pain and hematuria.   Musculoskeletal: Negative.    Skin: Negative.    Allergic/Immunologic: Negative.    Neurological: Positive for seizures and headaches. Negative for syncope.   Hematological: Negative for adenopathy. Does not bruise/bleed easily.   Psychiatric/Behavioral: Negative.        History  Past Medical History:   Diagnosis Date   • Brain tumor    • Coronary artery disease    • COVID-19 vaccine series completed     MODERNA X 3; LAST DOSE 3/2022   • Diabetes    • Erectile dysfunction    • GERD (gastroesophageal reflux disease)    • Hypercholesteremia    • Hypertension    • Seizure      Past Surgical History:   Procedure Laterality Date   • BALLOON ANGIOPLASTY, ARTERY Right    • COLONOSCOPY     • CRANIECTOMY Right 7/1/2022    Procedure: CRANIECTOMY WITH INCISIONAL WASHOUT;  Surgeon: Felipe Banerjee MD;  Location:  [facility] OR;  Service: Neurosurgery;  Laterality: Right;   • CRANIOPLASTY Right 10/4/2022    Procedure: CRANIOPLASTY right with Lumbar drain;  Surgeon: Flaco Portillo MD;  Location:  [facility] OR;  Service: Neurosurgery;  Laterality: Right;   • CRANIOTOMY FOR TUMOR  Right 6/16/2022    Procedure: CRANIOTOMY FOR TUMOR STERIOTACTIC WITH BRAIN LAB, right;  Surgeon: Flaco Portillo MD;  Location: [facility];  Service: Neurosurgery;  Laterality: Right;     Family History   Problem Relation Age of Onset   • Cancer Mother         liver   • Heart disease Father      Social History     Tobacco Use   • Smoking status: Former     Packs/day: 0.25     Types: Cigarettes   Vaping Use   • Vaping Use: Never used   Substance Use Topics   • Alcohol use: Never   • Drug use: Never     Medications Prior to Admission   Medication Sig Dispense Refill Last Dose   • amLODIPine (NORVASC) 10 MG tablet Take 1 tablet by mouth Daily.      • aspirin 81 MG EC tablet Take 1 tablet by mouth Daily.      • atorvastatin (LIPITOR) 20 MG tablet Take 1 tablet by mouth Every Night.      • gabapentin (NEURONTIN) 300 MG capsule Take 1 capsule by mouth 3 (Three) Times a Day.      • insulin detemir (LEVEMIR) 100 UNIT/ML injection Inject 20-35 Units under the skin into the appropriate area as directed 2 (Two) Times a Day. 20u am & 35u hs      • metoprolol tartrate (LOPRESSOR) 50 MG tablet Take 1 tablet by mouth 2 (Two) Times a Day With Meals.      • omeprazole (priLOSEC) 20 MG capsule Take 1 capsule by mouth Daily.      • sotalol (BETAPACE) 120 MG tablet Take 1 tablet by mouth 2 (Two) Times a Day.      • acetaminophen (TYLENOL) 325 MG tablet Take 2 tablets by mouth Every 4 (Four) Hours As Needed for Mild Pain .   Unknown   • HYDROcodone-acetaminophen (NORCO)  MG per tablet Take 1 tablet by mouth 4 (Four) Times a Day As Needed for Moderate Pain.      • levETIRAcetam (KEPPRA) 500 MG tablet Take 1 tablet by mouth 2 (Two) Times a Day.        Allergies:  Patient has no known allergies.    Objective     Vital Signs  Temp:  [98 °F (36.7 °C)-100.4 °F (38 °C)] 98.1 °F (36.7 °C)  Heart Rate:  [] 125  Resp:  [22-36] 23  BP: ()/() 103/71    Net IO Since Admission: 865.1 mL [04/02/23 1413]    Physical  Exam:  Vitals and nursing note reviewed.   Constitutional:       General: Not in acute distress.     Appearance: Not in distress.   Neck:      Vascular: No JVD or JVR. JVD normal.   Pulmonary:      Effort: Increased respiratory effort.      Breath sounds: Normal breath sounds.   Cardiovascular:      Tachycardia present. Irregularly irregular rhythm.      Murmurs: There is no murmur.      No gallop. No rub.   Pulses:     Intact distal pulses.   Skin:     General: Skin is warm and dry.   Neurological:      Mental Status: Alert, oriented to person, place, and time and oriented to person, place and time.       Results Review:   Lab Results (last 72 hours)     Procedure Component Value Units Date/Time    POC Glucose Once [[id number]]  (Abnormal) Collected: 04/02/23 1230    Specimen: Blood Updated: 04/02/23 1241     Glucose 293 mg/dL      Comment: : [id number] LilaKutuherMeter ID: [id number]       POC Glucose Once [[id number]]  (Abnormal) Collected: 04/02/23 1126    Specimen: Blood Updated: 04/02/23 1147     Glucose 304 mg/dL      Comment: : [id number] Felipe [employer] HersherMeter ID: [id number]       BNP [[id number]]  (Abnormal) Collected: 04/02/23 0613    Specimen: Blood Updated: 04/02/23 1123     proBNP 10,168.0 pg/mL     Narrative:      Among patients with dyspnea, NT-proBNP is highly sensitive for the detection of acute congestive heart failure. In addition NT-proBNP of <300 pg/ml effectively rules out acute congestive heart failure with 99% negative predictive value.    Results may be falsely decreased if patient taking Biotin.      Urine Culture - Urine, Urine, Clean Catch [[id number]]  (Normal) Collected: 03/31/23 2346    Specimen: Urine, Clean Catch Updated: 04/02/23 1051     Urine Culture No growth    D-dimer, Quantitative [[id number]]  (Normal) Collected: 04/02/23 1020    Specimen: Blood Updated: 04/02/23 1043     D-Dimer, Quantitative 0.66 MCGFEU/mL     Narrative:      According to the assay 's  "published package insert, a normal (<0.50 MCGFEU/mL) D-dimer result in conjunction with a non-high clinical probability assessment, excludes deep vein thrombosis (DVT) and pulmonary embolism (PE) with high sensitivity.    D-dimer values increase with age and this can make VTE exclusion of an older population difficult. To address this, the [employer], based on best available evidence and recent guidelines, recommends that clinicians use age-adjusted D-dimer thresholds in patients greater than 50 years of age with: a) a low probability of PE who do not meet all Pulmonary Embolism Rule Out Criteria, or b) in those with intermediate probability of PE.   The formula for an age-adjusted D-dimer cut-off is \"age/100\".  For example, a 60 year old patient would have an age-adjusted cut-off of 0.60 MCGFEU/mL and an 80 year old 0.80 MCGFEU/mL.    POC Glucose Once [[id number]]  (Abnormal) Collected: 04/02/23 1026    Specimen: Blood Updated: 04/02/23 1037     Glucose 291 mg/dL      Comment: : [id number] RELEASEIFherMeter ID: [id number]       Wound Culture - Aspirate, Forehead [[id number]] Collected: 04/01/23 1120    Specimen: Aspirate from Forehead Updated: 04/02/23 0832     Wound Culture Culture in progress     Gram Stain Many (4+) WBCs seen      No organisms seen    POC Glucose Once [[id number]]  (Abnormal) Collected: 04/02/23 0757    Specimen: Blood Updated: 04/02/23 0808     Glucose 307 mg/dL      Comment: : [id number] Felipe [employer] CompanyherMeter ID: [id number]       Culture, CSF - Cerebrospinal Fluid, Lumbar Puncture [[id number]] Collected: 03/31/23 1450    Specimen: Cerebrospinal Fluid from Lumbar Puncture Updated: 04/02/23 0721     CSF Culture No growth     Gram Stain No No organisms seen      Occasional WBCs seen    Basic Metabolic Panel [[id number]]  (Abnormal) Collected: 04/02/23 0613    Specimen: Blood Updated: 04/02/23 0641     Glucose 331 mg/dL      BUN 39 mg/dL      Creatinine 1.17 mg/dL      " Sodium 135 mmol/L      Potassium 4.4 mmol/L      Comment: Slight hemolysis detected by analyzer. Results may be affected.        Chloride 99 mmol/L      CO2 15.0 mmol/L      Calcium 8.3 mg/dL      BUN/Creatinine Ratio 33.3     Anion Gap 21.0 mmol/L      eGFR 68.3 mL/min/1.73     Narrative:      GFR Normal >60  Chronic Kidney Disease <60  Kidney Failure <15      CBC (No Diff) [[id number]]  (Abnormal) Collected: 04/02/23 0613    Specimen: Blood Updated: 04/02/23 0624     WBC 17.92 10*3/mm3      RBC 3.95 10*6/mm3      Hemoglobin 11.9 g/dL      Hematocrit 36.6 %      MCV 92.7 fL      MCH 30.1 pg      MCHC 32.5 g/dL      RDW 12.2 %      RDW-SD 41.3 fl      MPV 11.7 fL      Platelets 192 10*3/mm3     STAT Lactic Acid, Reflex [[id number]]  (Abnormal) Collected: 04/01/23 2340    Specimen: Blood Updated: 04/02/23 0031     Lactate 2.1 mmol/L     POC Glucose Once [[id number]]  (Abnormal) Collected: 04/01/23 2117    Specimen: Blood Updated: 04/01/23 2128     Glucose 188 mg/dL      Comment: : [id number] Hiram Hoskins ID: [id number]       Blood Gas, Arterial - [[id number]]  (Abnormal) Collected: 04/01/23 2054    Specimen: Arterial Blood Updated: 04/01/23 2053     Site Right Brachial     Marek's Test N/A     pH, Arterial 7.449 pH units      pCO2, Arterial 30.7 mm Hg      Comment: 84 Value below reference range        pO2, Arterial 52.9 mm Hg      Comment: 85 Value below critical limit        HCO3, Arterial 21.3 mmol/L      Base Excess, Arterial -1.8 mmol/L      Comment: 84 Value below reference range        O2 Saturation, Arterial 88.0 %      Comment: 84 Value below reference range        Temperature 37.0 C      Barometric Pressure for Blood Gas 752 mmHg      Modality Heated HFNC     FIO2 100 %      Flow Rate 40.0 lpm      Ventilator Mode NA     Notified Who HIRAM ZAPATA RN     Notified By [id number]     Notified Time 04/01/2023 20:55     Collected by [id number]     Comment: Meter: [id number]     :  [id number]         pCO2, Temperature Corrected 30.7 mm Hg      pH, Temp Corrected 7.449 pH Units      pO2, Temperature Corrected 52.9 mm Hg     STAT Lactic Acid, Reflex [[id number]]  (Abnormal) Collected: 04/01/23 1809    Specimen: Blood Updated: 04/01/23 1838     Lactate 2.9 mmol/L     POC Glucose Once [[id number]]  (Abnormal) Collected: 04/01/23 1657    Specimen: Blood Updated: 04/01/23 1708     Glucose 247 mg/dL      Comment: : [id number] Alizaangel BlancasArben ID: [id number]       Blood Culture - Blood, Arm, Right [[id number]]  (Normal) Collected: 03/31/23 1251    Specimen: Blood from Arm, Right Updated: 04/01/23 1330     Blood Culture No growth at 24 hours    Blood Culture - Blood, Arm, Left [[id number]]  (Normal) Collected: 03/31/23 1249    Specimen: Blood from Arm, Left Updated: 04/01/23 1330     Blood Culture No growth at 24 hours    AFB Culture - Cerebrospinal Fluid, Lumbar Puncture [[id number]] Collected: 03/31/23 1450    Specimen: Cerebrospinal Fluid from Lumbar Puncture Updated: 04/01/23 1320     AFB Stain No acid fast bacilli seen on direct smear    STAT Lactic Acid, Reflex [[id number]]  (Abnormal) Collected: 04/01/23 1220    Specimen: Blood Updated: 04/01/23 1248     Lactate 2.4 mmol/L     POC Glucose Once [[id number]]  (Abnormal) Collected: 04/01/23 1147    Specimen: Blood Updated: 04/01/23 1157     Glucose 268 mg/dL      Comment: : [id number] Felipe HeatherMeter ID: [id number]       STAT Lactic Acid, Reflex [[id number]]  (Abnormal) Collected: 04/01/23 0918    Specimen: Blood Updated: 04/01/23 0959     Lactate 2.7 mmol/L     High Sensitivity Troponin T [[id number]]  (Abnormal) Collected: 04/01/23 0804    Specimen: Blood Updated: 04/01/23 0902     HS Troponin T 409 ng/L     Narrative:      High Sensitive Troponin T Reference Range:  <10.0 ng/L- Negative Female for AMI  <15.0 ng/L- Negative Male for AMI  >=10 - Abnormal Female indicating possible myocardial injury.  >=15 - Abnormal Male indicating possible myocardial injury.    Clinicians would have to utilize clinical acumen, EKG, Troponin, and serial changes to determine if it is an Acute Myocardial Infarction or myocardial injury due to an underlying chronic condition.         STAT Lactic Acid, Reflex [[id number]]  (Abnormal) Collected: 04/01/23 0804    Specimen: Blood Updated: 04/01/23 0902     Lactate 3.3 mmol/L     POC Glucose Once [[id number]]  (Abnormal) Collected: 04/01/23 0828    Specimen: Blood Updated: 04/01/23 0839     Glucose 304 mg/dL      Comment: : [id number] Felipe HeatherMeter ID: [id number]       Lactic Acid, Plasma [[id number]]  (Abnormal) Collected: 04/01/23 0559    Specimen: Blood Updated: 04/01/23 0637     Lactate 2.6 mmol/L     High Sensitivity Troponin T 2Hr [[id number]]  (Abnormal) Collected: 04/01/23 0559    Specimen: Blood Updated: 04/01/23 0636     HS Troponin T 350 ng/L      Troponin T Delta 45 ng/L     Narrative:      High Sensitive Troponin T Reference Range:  <10.0 ng/L- Negative Female for AMI  <15.0 ng/L- Negative Male for AMI  >=10 - Abnormal Female indicating possible myocardial injury.  >=15 - Abnormal Male indicating possible myocardial injury.   Clinicians would have to utilize clinical acumen, EKG, Troponin, and serial changes to determine if it is an Acute Myocardial Infarction or myocardial injury due to an underlying chronic condition.         Basic Metabolic Panel [[id number]]  (Abnormal) Collected: 04/01/23 0559    Specimen: Blood Updated: 04/01/23 0636     Glucose 281 mg/dL      BUN 35 mg/dL      Creatinine 1.26 mg/dL      Sodium 136 mmol/L      Potassium 4.5 mmol/L      Chloride 97 mmol/L      CO2 19.0 mmol/L      Calcium 9.0 mg/dL      BUN/Creatinine Ratio 27.8     Anion Gap 20.0 mmol/L      eGFR 62.5 mL/min/1.73     Narrative:      GFR Normal >60  Chronic Kidney Disease <60  Kidney Failure <15      Blood Gas, Arterial - [[id number]]  (Abnormal) Collected: 04/01/23 0549    Specimen: Arterial Blood Updated: 04/01/23 0547     Site Right  Radial     Marek's Test Positive     pH, Arterial 7.341 pH units      Comment: 84 Value below reference range        pCO2, Arterial 32.0 mm Hg      Comment: 84 Value below reference range        pO2, Arterial 95.4 mm Hg      HCO3, Arterial 17.3 mmol/L      Comment: 84 Value below reference range        Base Excess, Arterial -7.4 mmol/L      Comment: 84 Value below reference range        O2 Saturation, Arterial 97.1 %      Temperature 37.0 C      Barometric Pressure for Blood Gas 741 mmHg      Modality Heated HFNC     FIO2 100 %      Flow Rate 40.0 lpm      Ventilator Mode NA     Collected by [id number]     Comment: Meter: [id number]     :  [id number]        pCO2, Temperature Corrected 32.0 mm Hg      pH, Temp Corrected 7.341 pH Units      pO2, Temperature Corrected 95.4 mm Hg     Comprehensive Metabolic Panel [[id number]]  (Abnormal) Collected: 04/01/23 0435    Specimen: Blood Updated: 04/01/23 0521     Glucose 261 mg/dL      BUN 34 mg/dL      Creatinine 1.20 mg/dL      Sodium 137 mmol/L      Potassium 3.9 mmol/L      Chloride 99 mmol/L      CO2 16.0 mmol/L      Calcium 8.7 mg/dL      Total Protein 7.4 g/dL      Albumin 3.5 g/dL      ALT (SGPT) 11 U/L      AST (SGOT) 28 U/L      Alkaline Phosphatase 111 U/L      Total Bilirubin 1.2 mg/dL      Globulin 3.9 gm/dL      A/G Ratio 0.9 g/dL      BUN/Creatinine Ratio 28.3     Anion Gap 22.0 mmol/L      eGFR 66.3 mL/min/1.73     Narrative:      GFR Normal >60  Chronic Kidney Disease <60  Kidney Failure <15      High Sensitivity Troponin T [[id number]]  (Abnormal) Collected: 04/01/23 0435    Specimen: Blood Updated: 04/01/23 0520     HS Troponin T 305 ng/L     Narrative:      High Sensitive Troponin T Reference Range:  <10.0 ng/L- Negative Female for AMI  <15.0 ng/L- Negative Male for AMI  >=10 - Abnormal Female indicating possible myocardial injury.  >=15 - Abnormal Male indicating possible myocardial injury.   Clinicians would have to utilize clinical acumen, EKG,  Troponin, and serial changes to determine if it is an Acute Myocardial Infarction or myocardial injury due to an underlying chronic condition.         CBC Auto Differential [[id number]]  (Abnormal) Collected: 04/01/23 0435    Specimen: Blood Updated: 04/01/23 0444     WBC 19.05 10*3/mm3      RBC 4.40 10*6/mm3      Hemoglobin 13.3 g/dL      Hematocrit 42.2 %      MCV 95.9 fL      MCH 30.2 pg      MCHC 31.5 g/dL      RDW 12.1 %      RDW-SD 42.8 fl      MPV 11.4 fL      Platelets 224 10*3/mm3      Neutrophil % 73.6 %      Lymphocyte % 13.5 %      Monocyte % 11.3 %      Eosinophil % 0.2 %      Basophil % 0.5 %      Immature Grans % 0.9 %      Neutrophils, Absolute 14.02 10*3/mm3      Lymphocytes, Absolute 2.58 10*3/mm3      Monocytes, Absolute 2.15 10*3/mm3      Eosinophils, Absolute 0.03 10*3/mm3      Basophils, Absolute 0.10 10*3/mm3      Immature Grans, Absolute 0.17 10*3/mm3      nRBC 0.0 /100 WBC     Blood Gas, Arterial - [[id number]]  (Abnormal) Collected: 04/01/23 0406    Specimen: Arterial Blood Updated: 04/01/23 0407     Site Right Brachial     Marek's Test N/A     pH, Arterial 7.372 pH units      pCO2, Arterial 30.4 mm Hg      Comment: 84 Value below reference range        pO2, Arterial 50.3 mm Hg      Comment: 85 Value below critical limit        HCO3, Arterial 17.6 mmol/L      Comment: 84 Value below reference range        Base Excess, Arterial -6.4 mmol/L      Comment: 84 Value below reference range        O2 Saturation, Arterial 83.4 %      Comment: 84 Value below reference range        Temperature 37.0 C      Barometric Pressure for Blood Gas 741 mmHg      Modality Nasal Cannula     Flow Rate 4.5 lpm      Ventilator Mode NA     Notified Who [id number]     Notified By [id number]     Notified Time 04/01/2023 04:07     Collected by [id number]     Comment: Meter: [id number]     :  [id number]        pCO2, Temperature Corrected 30.4 mm Hg      pH, Temp Corrected 7.372 pH Units      pO2, Temperature Corrected 50.3  mm Hg     POC Glucose Once [[id number]]  (Abnormal) Collected: 04/01/23 0340    Specimen: Blood Updated: 04/01/23 0351     Glucose 234 mg/dL      Comment: : [id number] Valeria Chao ID: [id number]       Urinalysis With Culture If Indicated - Urine, Clean Catch [[id number]]  (Abnormal) Collected: 03/31/23 2346    Specimen: Urine, Clean Catch Updated: 04/01/23 0042     Color, UA Orange     Appearance, UA Cloudy     pH, UA <=5.0     Specific Gravity, UA >1.030     Glucose,  mg/dL (Trace)     Ketones, UA Negative     Bilirubin, UA Small (1+)     Blood, UA Negative     Protein,  mg/dL (2+)     Leuk Esterase, UA Small (1+)     Nitrite, UA Positive     Urobilinogen, UA 2.0 E.U./dL    Narrative:      Dipstick results may be inaccurate due to color interference.    Urinalysis, Microscopic Only - Urine, Clean Catch [[id number]]  (Abnormal) Collected: 03/31/23 2346    Specimen: Urine, Clean Catch Updated: 04/01/23 0042     RBC, UA 3-5 /HPF      WBC, UA Too Numerous to Count /HPF      Bacteria, UA 1+ /HPF      Squamous Epithelial Cells, UA 7-12 /HPF      Yeast, UA Small/1+ Yeast /HPF      Hyaline Casts, UA 7-12 /LPF      Granular Casts, UA 3-6 /LPF      Methodology Manual Light Microscopy    POC Glucose Once [[id number]]  (Abnormal) Collected: 03/31/23 1703    Specimen: Blood Updated: 03/31/23 1718     Glucose 206 mg/dL      Comment: : [id number] Richard Reinoso ID: [id number]       Cell Count With Differential, CSF Use CSF Tube: 1 [[id number]]  (Abnormal) Collected: 03/31/23 1450    Specimen: Cerebrospinal Fluid from Lumbar Puncture Updated: 03/31/23 1556    Narrative:      The following orders were created for panel order Cell Count With Differential, CSF Use CSF Tube: 1.  Procedure                               Abnormality         Status                     ---------                               -----------         ------                     Cell Count, CSF - Cerebr...[[id number]]  Abnormal             Final result                 Please view results for these tests on the individual orders.    Cell Count, CSF - Cerebrospinal Fluid, Lumbar Puncture [[id number]]  (Abnormal) Collected: 03/31/23 1450    Specimen: Cerebrospinal Fluid from Lumbar Puncture Updated: 03/31/23 1556     Color, CSF Colorless     Supernatant Color, CSF Colorless     Appearance, CSF Clear     RBC, CSF 18 /mm3      Nucleated Cells, CSF 3 /mm3      Volume, CSF 26.0 mL      Tube Number, CSF 1     Method: Hemacytometer Method    Narrative:      Differential not indicated.    Protein, CSF - Cerebrospinal Fluid, Lumbar Puncture [[id number]]  (Abnormal) Collected: 03/31/23 1450    Specimen: Cerebrospinal Fluid from Lumbar Puncture Updated: 03/31/23 1554     Protein, Total (CSF) 90.9 mg/dL     Glucose, CSF - Cerebrospinal Fluid, Lumbar Puncture [[id number]]  (Abnormal) Collected: 03/31/23 1450    Specimen: Cerebrospinal Fluid from Lumbar Puncture Updated: 03/31/23 1554     Glucose,  mg/dL     Sedimentation Rate [[id number]]  (Abnormal) Collected: 03/31/23 1248    Specimen: Blood Updated: 03/31/23 1335     Sed Rate 60 mm/hr     Protime-INR [[id number]]  (Abnormal) Collected: 03/31/23 1249    Specimen: Blood Updated: 03/31/23 1332     Protime 14.9 Seconds      INR 1.16    aPTT [[id number]]  (Normal) Collected: 03/31/23 1249    Specimen: Blood Updated: 03/31/23 1332     PTT 34.3 seconds     POC Glucose Once [[id number]]  (Abnormal) Collected: 03/31/23 1257    Specimen: Blood Updated: 03/31/23 1309     Glucose 272 mg/dL      Comment: : [id number] Fatimah AshleyMeter ID: [id number]             ECGs reviewed, my interpretation:   (7/11/2022): Atrial fibrillation, RVR, nonspecific ST/T wave depression, LVH  (9/26/2022): Sinus bradycardia  (4/1/2023, at 431): Similar to the 7/11/2022 1 from above, atrial fibrillation with RVR (140 bpm), nonspecific ST/T wave abnormalities, poor R wave progression, borderline voltage criteria for LVH    CXRs personally  visualized, my interpretation:   -3/31/2023 (PA/lateral): No significant pulmonary edema.  -4/1/23 (AP): Very mild increased interstitial markings consistent with very mild pulmonary edema    Telemetry reviewed, my interpretations:  -Upon arrival to the ICU on 4/1, was in atrial fibrillation.  Average rates initially were in the 80s but then trended up last evening around 6:00 to the 100s and ultimately peaked with average rate in the 120s around 8 PM last night.  Rate gradually trended down thereafter and then at around 11:50 PM he went into an atrial flutter with 2: 1 AV conduction, resulting in ventricular rate of about 150 bpm for the most part maintaining between 11:50 PM and around 12:20 AM.  At that point, he converted back from flutter to atrial fibrillation and rates have gradually decreased, today ranging mostly in the 90s-low 100s.      Results for orders placed during the hospital encounter of 03/31/23    Adult Transthoracic Echo Complete W/ Cont if Necessary Per Protocol    Interpretation Summary  •  Left ventricular systolic function is mildly decreased. Left ventricular ejection fraction appears to be 46 - 50%.  •  Left ventricular wall thickness is consistent with mild to moderate concentric hypertrophy.  •  The following left ventricular wall segments are hypokinetic: apical anterior, apical lateral, apical inferior, apical septal, apex hypokinetic and mid anteroseptal.  •  Left atrial volume is mildly increased.  •  Estimated right ventricular systolic pressure from tricuspid regurgitation is mildly elevated (35-45 mmHg).  •  Normal size and function the right ventricle.  •  No significant (greater than mild) valvular pathology.  •  Mild dilation of the aortic root is present.  •  Compared to prior exam from 6/19/2022, both studies were technically difficult, but upon my independent review of the previous exam, there did appear to be some mild apical hypokinesis present on that study that does look  "slightly more prominent on current exam.      Assessment & Plan         1.  Acute hypoxic respiratory failure: No obvious cardiac etiology.  Patient is not volume overloaded and had not reported worsening volume/swelling or increasing weight prior to admission.  Rather, he reported a rather abrupt onset of severe shortness of breath on the day of admission, and noted that he had been having a mildly productive cough for about 5 weeks and stopping smoking but that had changed on the day before admission, to a cough productive of a more \"thick, green\" sputum according to the patient.    -Review of chest x-rays does not invoke concern for significant enough pulmonary edema to produce this degree of hypoxia  -Normal D-dimer essentially excludes PE.  -Discussed the above with the consulting physician Dr. Singh, who will ask pulmonary for further guidance    2.  Atrial fibrillation with rapid ventricular response: Exact history regarding his initial diagnosis of atrial fibrillation is not clear, though patient reports he was diagnosed several years ago [place].  He says he is never seen a cardiologist as an outpatient, though has been on sotalol prior to history of recent admissions here.  -Reviewing all of his ECGs, everyone has shown atrial fibrillation except for 1 on 9/26/2022, which revealed sinus bradycardia.  Therefore at this point, I do not think we can label him as permanent atrial fibrillation, but more likely a longstanding persistent form at this point.  Regardless of the precise behavior of the arrhythmia, given his overall illness, his rapid ventricular response to atrial fibrillation is not surprising.  Careful review of telemetry reveals that he is actually not that poorly controlled from a rate standpoint, though he is on low-dose Cardizem infusion.  His highest consistent ventricular rates were in the 120 range last night from 6 to 8 PM, aside from a bout of atrial flutter that he had around " "midnight with expected rates of 150 bpm.  Today, on Cardizem 5, his rates are in the 90s-low 100s.  -He is asymptomatic currently with regards to his A-fib/RVR  -Therefore, so long as he is taking oral medications, can convert from IV Cardizem to 60 mg p.o. every 6 hours  -Is currently anticoagulated, though was not so prior to admission given prior neurosurgical interventions  Patient has been on sotalol 120 twice daily at home (this had been discontinued previously during hospital stay in June 2022 for prolonged QTc), as well as Lopressor 50 twice daily.  He has not been anticoagulated, but on aspirin 81 mg daily.      3.  Elevated troponin: patient's chart lists coronary artery disease as a problem, and some notes even previously mentioned that he had a stent to the right coronary artery.  However, patient tells me he has never had a heart catheterization.  He says the stent that was placed was to a vessel in his leg.  We did discuss the fact that peripheral arterial disease is an equivalent of coronary disease, and with his diabetes and prior smoking, certainly he is at high risk for CAD.  I suspect his CAD accounts for the wall motion abnormality seen on his echocardiogram, but he is not currently complaining of any ischemic symptoms (and has not been prior to admission).  -Therefore, the elevated troponin is best labeled as \"acute myocardial injury\" and not reflective of acute coronary syndrome/non-STEMI.  -Continue aspirin (though cannot hold if needed from neurosurgical perspective), atorvastatin  -Patient would benefit from an ischemic evaluation as an outpatient, that it would not affect his perioperative risk at this point and certainly does not need to be done prior to any other surgery    I discussed the patient's findings and my recommendations with patient, nursing staff, and Dr Francisco Samaniego MD  04/02/23  14:13 CDT          Electronically signed by Jaxon Samaniego MD at 04/02/23 1414   "   Efra Hetcor DO at 04/01/23 0237              [facility] Medicine Services  consult    Date of Admission: 3/31/2023  Primary Care Physician: Brianna Martinez APRN Subjective   Primary Historian: Patient    Chief Complaint: Consult from admitting for A Fib    History of Present Illness  Very pleasant 67-year-old male who has an extensive history with brain tumor and craniotomy.  The patient was found to be in A-fib RVR, and hospital medicine was consulted.  On my exam, the patient is curled into the fetal position on his right side.  He states he does not feel well.  When I asked him what is bothering him, he tells me that his head is absolutely killing him.  He also feels dehydrated, and states that he has not had anything to eat or drink all day.  Cardiac echo noted below.  The patient states that he does feel slightly short of breath.  He is on a continuous pulse ox monitor and I noted him to be between 95 and 96.  He states he had some intermittent chest pain, possibly due to his rate.  Per his nurse, as he has had some dark urine with decreased output.  He has no lower extremity edema.  The patient is able to give his own history without any difficulty.    On chart review, the patient sotalol is currently on hold.    6/20/22 cardiac echo:  Interpretation Summary    • Left ventricular ejection fraction appears to be 56 - 60%. Left ventricular systolic function is normal.  • Left ventricular wall thickness is consistent with mild concentric hypertrophy.  • Normal right ventricular cavity size and systolic function noted.  • There is no significant (greater than mild) valvular dysfunction.      Review of Systems   Otherwise complete ROS reviewed and negative except as mentioned in the HPI.    Past Medical History:   Past Medical History:   Diagnosis Date   • Brain tumor (HCC)    • Coronary artery disease    • COVID-19 vaccine series completed     MODERNA X 3; LAST DOSE  3/2022   • Diabetes (HCC)    • Erectile dysfunction    • GERD (gastroesophageal reflux disease)    • Hypercholesteremia    • Hypertension    • Seizure (HCC)      Past Surgical History:  Past Surgical History:   Procedure Laterality Date   • BALLOON ANGIOPLASTY, ARTERY Right    • COLONOSCOPY     • CRANIECTOMY Right 7/1/2022    Procedure: CRANIECTOMY WITH INCISIONAL WASHOUT;  Surgeon: Felipe Banerjee MD;  Location:  [facility] OR;  Service: Neurosurgery;  Laterality: Right;   • CRANIOPLASTY Right 10/4/2022    Procedure: CRANIOPLASTY right with Lumbar drain;  Surgeon: Flaco Portillo MD;  Location:  [facility] OR;  Service: Neurosurgery;  Laterality: Right;   • CRANIOTOMY FOR TUMOR Right 6/16/2022    Procedure: CRANIOTOMY FOR TUMOR STERIOTACTIC WITH BRAIN LAB, right;  Surgeon: Flaco Portillo MD;  Location:  [facility] OR;  Service: Neurosurgery;  Laterality: Right;     Social History:  reports that he has quit smoking. His smoking use included cigarettes. He smoked an average of .25 packs per day. He does not have any smokeless tobacco history on file. He reports that he does not drink alcohol and does not use drugs.    Family History: family history includes Cancer in his mother; Heart disease in his father.       Allergies:  No Known Allergies    Medications:  Prior to Admission medications    Medication Sig Start Date End Date Taking? Authorizing Provider   amLODIPine (NORVASC) 10 MG tablet Take 1 tablet by mouth Daily. 7/12/22  Yes Rahul Arnold APRN   aspirin 81 MG EC tablet Take 1 tablet by mouth Daily.   Yes Helene Palmer MD   atorvastatin (LIPITOR) 20 MG tablet Take 1 tablet by mouth Every Night. 2/8/22  Yes Helene Palmer MD   gabapentin (NEURONTIN) 300 MG capsule Take 1 capsule by mouth 3 (Three) Times a Day. 3/7/22  Yes Helene Palmer MD   insulin detemir (LEVEMIR) 100 UNIT/ML injection Inject 20-35 Units under the skin into the appropriate area as directed 2 (Two) Times a Day. 20u  am & 35u hs   Yes Helene Palmer MD   metoprolol tartrate (LOPRESSOR) 50 MG tablet Take 1 tablet by mouth 2 (Two) Times a Day With Meals. 3/9/23  Yes Helene Palmer MD   omeprazole (priLOSEC) 20 MG capsule Take 1 capsule by mouth Daily.   Yes Helene Palmer MD   sotalol (BETAPACE) 120 MG tablet Take 1 tablet by mouth 2 (Two) Times a Day. 3/29/23  Yes Helene Palmer MD   acetaminophen (TYLENOL) 325 MG tablet Take 2 tablets by mouth Every 4 (Four) Hours As Needed for Mild Pain . 7/11/22   Rahul Arnold APRN   HYDROcodone-acetaminophen (NORCO)  MG per tablet Take 1 tablet by mouth 4 (Four) Times a Day As Needed for Moderate Pain. 3/6/23   Helene Palmer MD   levETIRAcetam (KEPPRA) 500 MG tablet Take 1 tablet by mouth 2 (Two) Times a Day.    Helene Palmer MD     I have utilized all available immediate resources to obtain, update, or review the patient's current medications (including all prescriptions, over-the-counter products, herbals, cannabis/cannabidiol products, and vitamin/mineral/dietary (nutritional) supplements).    Objective     Vital Signs: BP (!) 126/103 (BP Location: Left arm, Patient Position: Lying) Comment: nurse notified  Pulse (!) 151 Comment: nurse notified  Temp 98.2 °F (36.8 °C) (Oral)   Resp 18   SpO2 95%   Physical Exam  Vitals reviewed.   Constitutional:       Appearance: He is ill-appearing.   HENT:      Head: Normocephalic and atraumatic.      Right Ear: External ear normal.      Left Ear: External ear normal.      Nose: Nose normal.   Eyes:      General: No scleral icterus.     Conjunctiva/sclera: Conjunctivae normal.   Cardiovascular:      Rate and Rhythm: Tachycardia present. Rhythm irregular.      Heart sounds: Normal heart sounds.   Pulmonary:      Effort: Pulmonary effort is normal.      Breath sounds: Normal breath sounds.   Abdominal:      General: There is no distension.      Palpations: Abdomen is soft.      Tenderness: There is  no abdominal tenderness.   Musculoskeletal:         General: No swelling or tenderness.      Cervical back: Normal range of motion and neck supple.   Skin:     General: Skin is warm and dry.   Neurological:      Mental Status: He is alert.      Cranial Nerves: No cranial nerve deficit.   Psychiatric:         Mood and Affect: Mood normal.         Behavior: Behavior normal.      Comments: Appears that he does not feel well       Results Reviewed:  Lab Results (last 24 hours)     Procedure Component Value Units Date/Time    Urinalysis With Culture If Indicated - Urine, Clean Catch [[id number]]  (Abnormal) Collected: 03/31/23 2346    Specimen: Urine, Clean Catch Updated: 04/01/23 0042     Color, UA Orange     Appearance, UA Cloudy     pH, UA <=5.0     Specific Gravity, UA >1.030     Glucose,  mg/dL (Trace)     Ketones, UA Negative     Bilirubin, UA Small (1+)     Blood, UA Negative     Protein,  mg/dL (2+)     Leuk Esterase, UA Small (1+)     Nitrite, UA Positive     Urobilinogen, UA 2.0 E.U./dL    Narrative:      Dipstick results may be inaccurate due to color interference.    Urinalysis, Microscopic Only - Urine, Clean Catch [[id number]]  (Abnormal) Collected: 03/31/23 2346    Specimen: Urine, Clean Catch Updated: 04/01/23 0042     RBC, UA 3-5 /HPF      WBC, UA Too Numerous to Count /HPF      Bacteria, UA 1+ /HPF      Squamous Epithelial Cells, UA 7-12 /HPF      Yeast, UA Small/1+ Yeast /HPF      Hyaline Casts, UA 7-12 /LPF      Granular Casts, UA 3-6 /LPF      Methodology Manual Light Microscopy    Urine Culture - Urine, Urine, Clean Catch [[id number]] Collected: 03/31/23 2346    Specimen: Urine, Clean Catch Updated: 04/01/23 0042    POC Glucose Once [[id number]]  (Abnormal) Collected: 03/31/23 1703    Specimen: Blood Updated: 03/31/23 1718     Glucose 206 mg/dL      Comment: : [id number] Richardnazanin KingTherese ID: [id number]       Cell Count With Differential, CSF Use CSF Tube: 1 [[id number]]  (Abnormal)  Collected: 03/31/23 1450    Specimen: Cerebrospinal Fluid from Lumbar Puncture Updated: 03/31/23 1556    Narrative:      The following orders were created for panel order Cell Count With Differential, CSF Use CSF Tube: 1.  Procedure                               Abnormality         Status                     ---------                               -----------         ------                     Cell Count, CSF - Cerebr...[[id number]]  Abnormal            Final result                 Please view results for these tests on the individual orders.    Cell Count, CSF - Cerebrospinal Fluid, Lumbar Puncture [[id number]]  (Abnormal) Collected: 03/31/23 1450    Specimen: Cerebrospinal Fluid from Lumbar Puncture Updated: 03/31/23 1556     Color, CSF Colorless     Supernatant Color, CSF Colorless     Appearance, CSF Clear     RBC, CSF 18 /mm3      Nucleated Cells, CSF 3 /mm3      Volume, CSF 26.0 mL      Tube Number, CSF 1     Method: Hemacytometer Method    Narrative:      Differential not indicated.    Protein, CSF - Cerebrospinal Fluid, Lumbar Puncture [[id number]]  (Abnormal) Collected: 03/31/23 1450    Specimen: Cerebrospinal Fluid from Lumbar Puncture Updated: 03/31/23 1554     Protein, Total (CSF) 90.9 mg/dL     Glucose, CSF - Cerebrospinal Fluid, Lumbar Puncture [[id number]]  (Abnormal) Collected: 03/31/23 1450    Specimen: Cerebrospinal Fluid from Lumbar Puncture Updated: 03/31/23 1554     Glucose,  mg/dL     AFB Culture - Cerebrospinal Fluid, Lumbar Puncture [[id number]] Collected: 03/31/23 1450    Specimen: Cerebrospinal Fluid from Lumbar Puncture Updated: 03/31/23 1521    Culture, CSF - Cerebrospinal Fluid, Lumbar Puncture [[id number]] Collected: 03/31/23 1450    Specimen: Cerebrospinal Fluid from Lumbar Puncture Updated: 03/31/23 1521    Sedimentation Rate [[id number]]  (Abnormal) Collected: 03/31/23 1248    Specimen: Blood Updated: 03/31/23 1335     Sed Rate 60 mm/hr     Protime-INR [[id number]]  (Abnormal)  Collected: 03/31/23 1249    Specimen: Blood Updated: 03/31/23 1332     Protime 14.9 Seconds      INR 1.16    aPTT [[id number]]  (Normal) Collected: 03/31/23 1249    Specimen: Blood Updated: 03/31/23 1332     PTT 34.3 seconds     Blood Culture - Blood, Arm, Right [[id number]] Collected: 03/31/23 1251    Specimen: Blood from Arm, Right Updated: 03/31/23 1323    Blood Culture - Blood, Arm, Left [[id number]] Collected: 03/31/23 1249    Specimen: Blood from Arm, Left Updated: 03/31/23 1323    POC Glucose Once [[id number]]  (Abnormal) Collected: 03/31/23 1257    Specimen: Blood Updated: 03/31/23 1309     Glucose 272 mg/dL      Comment: : Marty Sheridan AshleyMeter ID: [id number]           Imaging Results (Last 24 Hours)     Procedure Component Value Units Date/Time    MRI Brain Without Contrast [[id number]] Collected: 03/31/23 1708     Updated: 03/31/23 1723    Narrative:      HISTORY: Pseudomeningocele, 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 Patient refuses additional imaging. No IV contrast  administered as patient unable to tolerate exam     COMPARISON: CT head 03/31/2023     FINDINGS: Postoperative changes from right frontal cranioplasty. There  is fluid attenuated signal seen superficial to the cranioplasty flap  within the scalp, increased from the postoperative 10/04/2022 CT head  exam, concerning for pseudomeningocele. There is questionable subdural  hemorrhage seen deep to the cranioplasty flap, however T2 gradient  sequences are not obtained. There is increased FLAIR signal within the  underlying right frontal lobe parenchyma. There is no midline shifting.  There is mild generalized volume loss. No intra-axial mass is  identified.       Impression:      1. Limited exam. Patient is able to tolerate only axial diffusion  weighted, FLAIR, T1 sequences with a single sagittal T1 sequence. No  additional imaging or contrast  administered due to patient unable to  tolerate study.  2. Right frontal cranioplasty changes. Increasing fluid collection  superficial to the cranioplasty flap within the scalp concerning for  pseudomeningocele. Questionable small subdural hemorrhage deep to the  cranioplasty site. Mild hyperintense FLAIR signal noted within the  underlying right frontal lobe parenchyma.  This report was finalized on 03/31/2023 17:20 by Dr. Coleen Terry MD.    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     DLP in mGycm= 413.     Right frontal craniectomy.     Large right frontal scalp fluid collection measuring approximately 8 cm  in diameter and 1.8 cm in thickness.  There is a 5 mm rim of low density fluid deep to the synthetic catheter  placed at the right frontal craniectomy site.  Deep to this rim of fluid is a 7 mm thick rim of acute hemorrhage.     There is no significant mass effect.  Mild right frontal white matter edema.     Normal ventricle size.  No parenchymal hemorrhage or acute infarct.  No midline shift.     Summary:  1. Postsurgical changes with right frontal scalp fluid collection and a  small amount of right frontal ventricular cranial hemorrhage. No midline  shift or large intracranial hematoma.                                   This report was finalized on 03/31/2023 15:44 by Dr. Ghulam Cano MD.    IR LUMBAR PUNCTURE DIAGNOSTIC [[id number]] Collected: 03/31/23 1528     Updated: 03/31/23 1535    Narrative:      EXAMINATION: IR LUMBAR PUNCTURE DIAGNOSTIC-     3/31/2023 2:33 PM CDT     HISTORY: Pseudomeningocele concern for postoperative  intracranial  infection     The procedure was explained to the patient. The benefits, and the risk  and complications were discussed. The patient understood the procedure  and signed the consent.     The patient was positioned on the fluoroscopy table in prone position  and lumbar spine was examined. A suitable spot opposite space L1-2 was  selected for puncture. The spot was marked with an ink marker.     After sterile preparation and application of local anesthesia a size  20-gauge spinal needle was introduced into the thecal sac and a free  flow of clear colorless thin CSF was established. The outer hub of the  spinal needle was attached to a pressure measuring device and opening  pressure was measured. The opening pressure is 23 cm water. Subsequently  27 mL of CSF was removed and and displaced and 4 separate test tubes  which were sent to the laboratory for further evaluation as instructed  by the attending physician. The needle was then withdrawn after  replacing the stylus. The puncture site was covered with a sterile  Band-Aid.     Patient tolerated the procedure well. No immediate complications were  noted.     The postprocedure care instructions were given to the patient.       Impression:      1. A successful lumbar puncture and opening and closing pressure  measurements.  2. Fluoroscopy time: 1 minute 14 seconds.  3. The dose: 101mGy.  4. Number of images: 1  This report was finalized on 03/31/2023 15:32 by Dr. Michelle Vigil MD.    XR Chest PA & Lateral [[id number]] Collected: 03/31/23 1452     Updated: 03/31/23 1458    Narrative:      HISTORY: Dyspnea     CXR: 2 views the chest are obtained.     COMPARISON: 09/26/2022     FINDINGS: There is an increasing prominence of the interstitial markings  diffusely. Anterior upper lobe consolidation on the lateral view is not  identified of the frontal exam and most likely secondary to soft tissue  artifact/attenuation. The heart is upper limits of normal in  size. No  pleural effusion or pneumothorax identified. No acute regional bony  pathology. Chronic calcifications adjacent to the bilateral shoulders.       Impression:      1. Increasing interstitial densities may relate to mild edema/volume  overload. Pneumonitis considered. Soft tissue attenuation/artifact  suspected on the lateral view.  This report was finalized on 03/31/2023 14:55 by Dr. Coleen Terry MD.        I have personally reviewed and interpreted the radiology studies and ECG obtained at time of admission.     Assessment / Plan   Assessment:   Active Hospital Problems    Diagnosis    • **Facial swelling      Impression:  1.  A-fib with RVR   2.  Insulin-dependent diabetes  3.  Headache pain  4.  Abnormal urinalysis    Treatment Plan  1.  We will add gentle IV hydration  2.  Add Cardizem drip  3.  Current long-acting insulin is on hold secondary to patient's current n.p.o. status.  4.  Pain control  5.  Question starting antibiotics.  Will defer to attending.  Question need for repeat urinalysis or addition of Rocephin secondary to UTI.  6.  Follow-up labs in the morning  7.  May consider restarting sotalol, for rate control    About 45 minutes after evaluation, patient declined and 02 saturation declined. Patient was moved to the ICU.   Rate still tachycardic. Patient placed on VapoTherm 100%. 02 pulse OX improved to above 90 %.   Concern for infection VS PE.   Previous CXR Pneumonitis VS Edema.     Will add Zosyn, WBC worsening. Get CTA. Cardiology consult as Troponin was elevated and rate not controlled. Repeat troponin in 2 hours.     The patient will be consulted to my service here at [facility].  Primary team to take over in the morning    Medical Decision Making  Number and Complexity of problems: 4, high  Differential Diagnosis: UTI    Conditions and Status        Condition is worsening.     [facility] Data  External documents reviewed: None  Cardiac tracing (EKG, telemetry)  interpretation: None  Radiology interpretation: None  Labs reviewed: Reviewed  Any tests that were considered but not ordered: None     Decision rules/scores evaluated (example [id number], Wells, etc): None     Discussed with: Patient and nursing     Care Planning  Shared decision making: Patient and nursing  Code status and discussions: Full    Disposition  Social Determinants of Health that impact treatment or disposition: None  Estimated length of stay is per attending.     I confirmed that the patient's advanced care plan is present, code status is documented, and a surrogate decision maker is listed in the patient's medical record.     The patient's surrogate decision maker is family.     The patient was seen and examined by me on 4/1/2023 at seen after midnight.    Electronically signed by Efra Hector DO, 04/01/23, 02:37 CDT.                Electronically signed by Efra Hector DO at 04/01/23 0530

## 2023-04-03 NOTE — THERAPY TREATMENT NOTE
Acute Care - Physical Therapy Treatment Note   [place]     Patient Name: Elijah Escobar  : 1955  MRN: [id number]  Today's Date: 4/3/2023      Visit Dx:     ICD-10-CM ICD-9-CM   1. S/P craniotomy  Z98.890 V45.89   2. Impaired mobility  Z74.09 799.89   3. Infection of deep incisional surgical site after procedure, initial encounter  T81.42XA 998.59     Patient Active Problem List   Diagnosis   • Meningioma s/p craniotomy   • Hypertension   • GERD (gastroesophageal reflux disease)   • Coronary artery disease   • Class 2 severe obesity due to excess calories with serious comorbidity and body mass index (BMI) of 38.0 to 38.9 in adult   • Type 2 diabetes mellitus with hyperglycemia and peripheral neuropathy, with long-term current use of insulin (HCC)   • Leukocytosis   • Other specified anemias   • Paroxysmal atrial fibrillation with rapid ventricular response   • Post-operative infection   • S/P craniotomy   • Smoker   • History of cranioplasty   • Facial edema   • Facial swelling   • Acute pulmonary edema due to A-fib RVR   • Acute respiratory failure with hypoxia   • Myocardial injury     Past Medical History:   Diagnosis Date   • Brain tumor    • Coronary artery disease    • COVID-19 vaccine series completed     MODERNA X 3; LAST DOSE 3/2022   • Diabetes    • Erectile dysfunction    • GERD (gastroesophageal reflux disease)    • Hypercholesteremia    • Hypertension    • Seizure      Past Surgical History:   Procedure Laterality Date   • BALLOON ANGIOPLASTY, ARTERY Right    • COLONOSCOPY     • CRANIECTOMY Right 2022    Procedure: CRANIECTOMY WITH INCISIONAL WASHOUT;  Surgeon: Felipe Banerjee MD;  Location: [facility] OR;  Service: Neurosurgery;  Laterality: Right;   • CRANIOPLASTY Right 10/4/2022    Procedure: CRANIOPLASTY right with Lumbar drain;  Surgeon: Flaco Portillo MD;  Location: [facility] OR;  Service: Neurosurgery;  Laterality: Right;   • CRANIOTOMY FOR TUMOR Right 2022    Procedure:  CRANIOTOMY FOR TUMOR STERIOTACTIC WITH BRAIN LAB, right;  Surgeon: Flaco Portillo MD;  Location: [facility];  Service: Neurosurgery;  Laterality: Right;     PT Assessment (last 12 hours)     PT Evaluation and Treatment     Row Name 04/03/23 1353          Physical Therapy Time and Intention    Subjective Information no complaints  -CHRISTIANO     Document Type therapy note (daily note)  -CHRISTIANO     Mode of Treatment physical therapy  -CHRISTIANO     Row Name 04/03/23 1353          General Information    Existing Precautions/Restrictions fall;oxygen therapy device and L/min  -CHRISTIANO     Row Name 04/03/23 1353          Pain    Pretreatment Pain Rating 0/10 - no pain  -CHRISTIANO     Posttreatment Pain Rating 0/10 - no pain  -CHRISTIANO     Row Name 04/03/23 1353          Bed Mobility    Bed Mobility supine-sit;sit-supine  -CHRISTIANO     Supine-Sit [place] (Bed Mobility) verbal cues;contact guard  -CHRISTIANO     Sit-Supine [place] (Bed Mobility) verbal cues;contact guard  -CHRISTIANO     Row Name 04/03/23 1353          Transfers    Transfers sit-stand transfer;stand-sit transfer  -CHRISTIANO     Row Name 04/03/23 1353          Sit-Stand Transfer    Sit-Stand [place] (Transfers) verbal cues;contact guard  -CHRISTIANO     Assistive Device (Sit-Stand Transfers) cane, straight  -CHRISTIANO     Row Name 04/03/23 1353          Stand-Sit Transfer    Stand-Sit [place] (Transfers) verbal cues;contact guard  -CHRISTIANO     Assistive Device (Stand-Sit Transfers) cane, straight  -CHRISTIANO     Row Name 04/03/23 1353          Gait/Stairs (Locomotion)    [place] Level (Gait) verbal cues;contact guard  -CHRISTIANO     Assistive Device (Gait) cane, straight  -CHRISTIANO     Distance in Feet (Gait) 200  -CHRISTIANO     Deviations/Abnormal Patterns (Gait) base of support, wide;gait speed decreased;stride length decreased  -CHRISTIANO     Bilateral Gait Deviations heel strike decreased  -CHRISTIANO     Row Name 04/03/23 1353          Positioning and Restraints    Pre-Treatment Position in bed  -CHRISTIANO     Post Treatment Position bed  -CHRISTIANO     In  Bed fowlers;call light within reach;encouraged to call for assist;side rails up x2  -CHRISTIANO           User Key  (r) = Recorded By, (t) = Taken By, (c) = Cosigned By    Initials Name Provider Type    Александр Morley PTA Physical Therapist Assistant                Physical Therapy Education     Title: PT OT SLP Therapies (In Progress)     Topic: Physical Therapy (In Progress)     Point: Mobility training (Done)     Learning Progress Summary           Patient Acceptance, E, VU by CHRISTIANO at 4/3/2023 1353    Comment: gait, safety,    Acceptance, E, VU by SB at 4/1/2023 1405    Comment: pt edu on POC, benefits of act, PLB and d/c plans                   Point: Home exercise program (Not Started)     Learner Progress:  Not documented in this visit.          Point: Body mechanics (Not Started)     Learner Progress:  Not documented in this visit.          Point: Precautions (Done)     Learning Progress Summary           Patient Acceptance, E, VU by SB at 4/1/2023 1405    Comment: pt edu on POC, benefits of act, PLB and d/c plans                               User Key     Initials Effective Dates Name Provider Type Discipline    CHRISTIANO 02/03/23 -  Александр Saravia PTA Physical Therapist Assistant PT    SB 06/16/21 -  Erika Javier, PT DPT Physical Therapist PT              PT Recommendation and Plan     Plan of Care Reviewed With: patient  Progress: improving  Outcome Evaluation: Pt was in bed.  Transfered supine to sitting with CGA.  Transfered sit to stand with CGA.  Amb 200' with straight cane and CGA. on 2L throughout tx.   Outcome Measures     Row Name 04/03/23 1353             How much help from another person do you currently need...    Turning from your back to your side while in flat bed without using bedrails? 4  -CHRISTIANO      Moving from lying on back to sitting on the side of a flat bed without bedrails? 4  -CHRISTIANO      Moving to and from a bed to a chair (including a wheelchair)? 3  -CHRISTIANO      Standing up from a chair using  your arms (e.g., wheelchair, bedside chair)? 3  -CHRISTIANO      Climbing 3-5 steps with a railing? 3  -CHRISTIANO      To walk in hospital room? 3  -CHRISTIANO      AM-PAC 6 Clicks Score (PT) 20  -CHRISTIANO         Functional Assessment    Outcome Measure Options AM-PAC 6 Clicks Basic Mobility (PT)  -CHRISTIANO            User Key  (r) = Recorded By, (t) = Taken By, (c) = Cosigned By    Initials Name Provider Type    Александр Morley PTA Physical Therapist Assistant                 Time Calculation:    PT Charges     Row Name 04/03/23 1353             Time Calculation    Start Time 1353  -CHRISTIANO      Stop Time 1424  -CHRISTIANO      Time Calculation (min) 31 min  -CHRISTIANO      PT Received On 04/03/23  -CHRISTIANO         Time Calculation- PT    Total Timed Code Minutes- PT 31 minute(s)  -CHRISTIANO         Timed Charges    94669 - Gait Training Minutes  31  -CHRISTIANO         Total Minutes    Timed Charges Total Minutes 31  -CHRISTIANO       Total Minutes 31  -CHRISTIANO            User Key  (r) = Recorded By, (t) = Taken By, (c) = Cosigned By    Initials Name Provider Type    Александр Morley PTA Physical Therapist Assistant              Therapy Charges for Today     Code Description Service Date Service Provider Modifiers Qty    [id number] HC GAIT TRAINING EA 15 MIN 4/3/2023 Александр Saravia PTA GP 2          PT G-Codes  Outcome Measure Options: AM-PAC 6 Clicks Basic Mobility (PT)  AM-PAC 6 Clicks Score (PT): 20  AM-PAC 6 Clicks Score (OT): 18    Александр Saravia PTA  4/3/2023

## 2023-04-04 ENCOUNTER — ANESTHESIA EVENT (OUTPATIENT)
Dept: PERIOP | Facility: HOSPITAL | Age: 68
DRG: 3 | End: 2023-04-04
Payer: MEDICARE

## 2023-04-04 ENCOUNTER — ANESTHESIA (OUTPATIENT)
Dept: PERIOP | Facility: HOSPITAL | Age: 68
DRG: 3 | End: 2023-04-04
Payer: MEDICARE

## 2023-04-04 PROBLEM — I49.9 TYPE 2 MYOCARDIAL INFARCTION DUE TO ARRHYTHMIA: Status: ACTIVE | Noted: 2023-01-01

## 2023-04-04 PROBLEM — G47.33 OSA (OBSTRUCTIVE SLEEP APNEA): Status: ACTIVE | Noted: 2023-01-01

## 2023-04-04 PROBLEM — J44.9 COPD (CHRONIC OBSTRUCTIVE PULMONARY DISEASE): Status: ACTIVE | Noted: 2023-01-01

## 2023-04-04 PROBLEM — I50.21 ACUTE SYSTOLIC HEART FAILURE: Status: ACTIVE | Noted: 2023-01-01

## 2023-04-04 PROBLEM — F17.201 TOBACCO ABUSE, IN REMISSION: Status: ACTIVE | Noted: 2023-01-01

## 2023-04-04 PROBLEM — I21.A1 TYPE 2 MYOCARDIAL INFARCTION DUE TO ARRHYTHMIA: Status: ACTIVE | Noted: 2023-04-02

## 2023-04-04 PROBLEM — E66.9 OBESITY (BMI 30-39.9): Status: ACTIVE | Noted: 2023-01-01

## 2023-04-04 PROCEDURE — 25010000002 PHENYLEPHRINE 10 MG/ML SOLUTION 1 ML VIAL: Performed by: NURSE ANESTHETIST, CERTIFIED REGISTERED

## 2023-04-04 PROCEDURE — 25010000002 CEFAZOLIN PER 500 MG: Performed by: NEUROLOGICAL SURGERY

## 2023-04-04 PROCEDURE — 25010000002 CEFTRIAXONE PER 250 MG: Performed by: INTERNAL MEDICINE

## 2023-04-04 PROCEDURE — 25010000002 PROPOFOL 10 MG/ML EMULSION: Performed by: NURSE ANESTHETIST, CERTIFIED REGISTERED

## 2023-04-04 PROCEDURE — 25010000002 ONDANSETRON PER 1 MG: Performed by: NURSE ANESTHETIST, CERTIFIED REGISTERED

## 2023-04-04 RX ORDER — ROCURONIUM BROMIDE 10 MG/ML
INJECTION, SOLUTION INTRAVENOUS AS NEEDED
Status: DISCONTINUED | OUTPATIENT
Start: 2023-04-04 | End: 2023-04-04 | Stop reason: SURG

## 2023-04-04 RX ORDER — ONDANSETRON 2 MG/ML
INJECTION INTRAMUSCULAR; INTRAVENOUS AS NEEDED
Status: DISCONTINUED | OUTPATIENT
Start: 2023-04-04 | End: 2023-04-04 | Stop reason: SURG

## 2023-04-04 RX ORDER — BUPIVACAINE HCL/0.9 % NACL/PF 0.125 %
PLASTIC BAG, INJECTION (ML) EPIDURAL AS NEEDED
Status: DISCONTINUED | OUTPATIENT
Start: 2023-04-04 | End: 2023-04-04 | Stop reason: SURG

## 2023-04-04 RX ORDER — LIDOCAINE HYDROCHLORIDE 20 MG/ML
INJECTION, SOLUTION EPIDURAL; INFILTRATION; INTRACAUDAL; PERINEURAL AS NEEDED
Status: DISCONTINUED | OUTPATIENT
Start: 2023-04-04 | End: 2023-04-04 | Stop reason: SURG

## 2023-04-04 RX ORDER — PROPOFOL 10 MG/ML
VIAL (ML) INTRAVENOUS AS NEEDED
Status: DISCONTINUED | OUTPATIENT
Start: 2023-04-04 | End: 2023-04-04 | Stop reason: SURG

## 2023-04-04 RX ADMIN — LIDOCAINE HYDROCHLORIDE 100 MG: 20 INJECTION, SOLUTION EPIDURAL; INFILTRATION; INTRACAUDAL at 10:30

## 2023-04-04 RX ADMIN — PHENYLEPHRINE HYDROCHLORIDE 0.2 MCG/KG/MIN: 10 INJECTION INTRAVENOUS at 10:40

## 2023-04-04 RX ADMIN — ROCURONIUM BROMIDE 20 MG: 10 INJECTION, SOLUTION INTRAVENOUS at 11:22

## 2023-04-04 RX ADMIN — CEFAZOLIN 2 G: 2 INJECTION, POWDER, FOR SOLUTION INTRAMUSCULAR; INTRAVENOUS at 10:40

## 2023-04-04 RX ADMIN — Medication 100 MCG: at 11:29

## 2023-04-04 RX ADMIN — REMIFENTANIL HYDROCHLORIDE 0.4 MCG/KG/MIN: 1 INJECTION, POWDER, LYOPHILIZED, FOR SOLUTION INTRAVENOUS at 10:39

## 2023-04-04 RX ADMIN — ONDANSETRON 4 MG: 2 INJECTION INTRAMUSCULAR; INTRAVENOUS at 11:35

## 2023-04-04 RX ADMIN — CEFTRIAXONE 1 G: 1 INJECTION, POWDER, FOR SOLUTION INTRAMUSCULAR; INTRAVENOUS at 10:57

## 2023-04-04 RX ADMIN — PROPOFOL INJECTABLE EMULSION 180 MG: 10 INJECTION, EMULSION INTRAVENOUS at 10:30

## 2023-04-04 RX ADMIN — ROCURONIUM BROMIDE 50 MG: 10 INJECTION, SOLUTION INTRAVENOUS at 10:30

## 2023-04-04 RX ADMIN — SUGAMMADEX 200 MG: 100 INJECTION, SOLUTION INTRAVENOUS at 12:16

## 2023-04-04 NOTE — PLAN OF CARE
Goal Outcome Evaluation:  Plan of Care Reviewed With: patient           Outcome Evaluation: pt off gluccomander diet tolerated insulin as per orders. planned or for tommorrow with dr james haynes. up in chair today up to bedside commode. [id number] veronica.

## 2023-04-04 NOTE — PROGRESS NOTES
[facility] [place] - [facility]    Patient: Elijah Escobar  1955   MR# [id number]   Acct# [id number]  04/04/23   07:02 CDT  Referring Provider: Flaco Portillo, *    Chief Complaint: Respiratory failure    Interval history: Afebrile.  Saturation 97 on room air while talking on his phone.  Slept on BiPAP.  No x-ray for review.  Labs pending.  Will defer steroid dosing to Dr Gupta at this point. He remains in A-fib although heart rates have improved.  No new issues.    Meds:  aspirin, 81 mg, Oral, Daily  atorvastatin, 20 mg, Oral, Nightly  budesonide-formoterol, 2 puff, Inhalation, BID - RT  Chlorhexidine Gluconate Cloth, 1 application, Topical, Q24H  digoxin, 125 mcg, Oral, Daily  dilTIAZem CD, 240 mg, Oral, Q24H  enoxaparin, 1 mg/kg, Subcutaneous, Q12H  furosemide, 20 mg, Oral, Daily  gabapentin, 300 mg, Oral, TID  gadobenate dimeglumine, 20 mL, Intravenous, Once in imaging  insulin detemir, 15 Units, Subcutaneous, Q12H  insulin lispro, 0-14 Units, Subcutaneous, TID AC  ipratropium, 0.5 mg, Nebulization, 4x Daily - RT  levETIRAcetam, 500 mg, Oral, BID  methylPREDNISolone sodium succinate, 60 mg, Intravenous, Q8H  metoprolol tartrate, 50 mg, Oral, Q12H  mupirocin, 1 application, Each Nare, BID  pantoprazole, 40 mg, Oral, Q AM  piperacillin-tazobactam, 4.5 g, Intravenous, Q8H  polyethylene glycol, 17 g, Oral, Daily  sennosides-docusate, 1 tablet, Oral, Daily  sodium chloride, 10 mL, Intravenous, Q12H  vancomycin, 1,250 mg, Intravenous, Q12H      dilTIAZem, 5-15 mg/hr, Last Rate: Stopped (04/02/23 1600)  Pharmacy to Dose enoxaparin (LOVENOX),         Physical Exam:  SpO2 Percentage    04/04/23 0500 04/04/23 0631 04/04/23 0637   SpO2: 97% 94% 97%     Body mass index is 35.28 kg/m².   Temp:  [97.1 °F (36.2 °C)-98.1 °F (36.7 °C)] 98.1 °F (36.7 °C)  Heart Rate:  [] 98  Resp:  [18-22] 18  BP: ()/(61-95) 129/76    Intake/Output Summary (Last 24 hours)  at 4/4/2023 0702  Last data filed at 4/4/2023 0100  Gross per 24 hour   Intake 800 ml   Output 1150 ml   Net -350 ml     Physical Exam   Constitutional:       Appearance: Normal appearance. He is obese. He is ill-appearing. He is not toxic-appearing.    HENT:      Head: Normocephalic.      Right Ear: External ear normal.      Left Ear: External ear normal.      Nose: Nose normal.      Mouth/Throat:      Mouth: Mucous membranes are moist.   Eyes:      General:         Right eye: No discharge.         Left eye: No discharge.      Conjunctiva/sclera: Conjunctivae normal.      Pupils: Pupils are equal, round, and reactive to light.   Cardiovascular:      Rate and Rhythm: Tachycardia present. Rhythm irregular.      Heart sounds: No murmur heard.  Pulmonary:      Effort: Pulmonary effort is normal. No respiratory distress.      Breath sounds: No wheezing.   Abdominal:      General: Abdomen is flat. Bowel sounds are normal. There is no distension.      Palpations: Abdomen is soft. There is no mass.   Musculoskeletal:      Cervical back: Neck supple.      Right lower leg: No edema.      Left lower leg: No edema.   Skin:     General: Skin is warm and dry.      Coloration: Skin is not jaundiced or pale.   Neurological:      General: No focal deficit present.      Mental Status: He is alert and oriented to person, place, and time.      Comments: Generally weak   Psychiatric:         Mood and Affect: Mood normal.         Behavior: Behavior normal.         Thought Content: Thought content normal.         Judgment: Judgment normal.     Electronically signed by ADITYA Olivera, 4/4/2023, 07:02 CDT      Physician substantive portion: medical decision making  Result Review  Laboratory Data:  Results from last 7 days   Lab Units 04/04/23  0805 04/03/23  0436 04/02/23  0613   WBC 10*3/mm3 14.40* 11.00* 17.92*   HEMOGLOBIN g/dL 11.6* 11.0* 11.9*   PLATELETS 10*3/mm3 234 188 192     Results from last 7 days   Lab Units  04/04/23  0745 04/03/23  0436 04/03/23  0040 04/02/23  0613 04/01/23  2340 04/01/23  0435 03/31/23  1249   SODIUM mmol/L 136 139  --  135*  --    < >  --    POTASSIUM mmol/L 3.3* 3.6  --  4.4  --    < >  --    CO2 mmol/L 21.0* 23.0  --  15.0*  --    < >  --    BUN mg/dL 42* 36*  --  39*  --    < >  --    CREATININE mg/dL 1.36* 1.16  --  1.17  --    < >  --    INR   --   --   --   --   --   --  1.16*   LACTATE mmol/L  --   --   --   --  2.1*   < >  --    CRP mg/dL  --   --  21.26*  --   --   --   --     < > = values in this interval not displayed.     Results from last 7 days   Lab Units 04/03/23  0423 04/02/23  1455 04/01/23 2054   PH, ARTERIAL pH units 7.482* 7.503* 7.449   PCO2, ARTERIAL mm Hg 32.9* 28.3* 30.7*   PO2 ART mm Hg 85.7 52.2* 52.9*   FIO2 % 70 80 100     Microbiology Results (last 10 days)     Procedure Component Value - Date/Time    Respiratory Culture - Sputum, Cough [[id number]] Collected: 04/03/23 1826    Lab Status: Final result Specimen: Sputum from Cough Updated: 04/04/23 1359     Respiratory Culture Rejected     Gram Stain Rare (1+) WBCs seen      Few (2+) Epithelial cells seen      Few (2+) Yeast with hyphae seen      Rare (1+) Gram positive cocci    Narrative:      Specimen rejected due to oropharyngeal contamination. Please reorder and recollect specimen if clinically necessary.    Legionella Antigen, Urine - Urine, Urine, Clean Catch [[id number]]  (Normal) Collected: 04/02/23 2148    Lab Status: Final result Specimen: Urine, Clean Catch Updated: 04/02/23 2213     LEGIONELLA ANTIGEN, URINE Negative    S. Pneumo Ag Urine or CSF - Urine, Urine, Clean Catch [[id number]]  (Normal) Collected: 04/02/23 2148    Lab Status: Final result Specimen: Urine, Clean Catch Updated: 04/02/23 2214     Strep Pneumo Ag Negative    Respiratory Panel PCR w/COVID-19(SARS-CoV-2) [facility] In-House, NP Swab in UTM/VTM, 3-4 HR TAT - Swab, Nasopharynx [[id number]]  (Normal) Collected: 04/02/23 1512     Lab Status: Final result Specimen: Swab from Nasopharynx Updated: 04/02/23 1608     ADENOVIRUS, PCR Not Detected     Coronavirus 229E Not Detected     Coronavirus HKU1 Not Detected     Coronavirus NL63 Not Detected     Coronavirus OC43 Not Detected     COVID19 Not Detected     Human Metapneumovirus Not Detected     Human Rhinovirus/Enterovirus Not Detected     Influenza A PCR Not Detected     Influenza B PCR Not Detected     Parainfluenza Virus 1 Not Detected     Parainfluenza Virus 2 Not Detected     Parainfluenza Virus 3 Not Detected     Parainfluenza Virus 4 Not Detected     RSV, PCR Not Detected     Bordetella pertussis pcr Not Detected     Bordetella parapertussis PCR Not Detected     Chlamydophila pneumoniae PCR Not Detected     Mycoplasma pneumo by PCR Not Detected    Narrative:      In the setting of a positive respiratory panel with a viral infection PLUS a negative procalcitonin without other underlying concern for bacterial infection, consider observing off antibiotics or discontinuation of antibiotics and continue supportive care. If the respiratory panel is positive for atypical bacterial infection (Bordetella pertussis, Chlamydophila pneumoniae, or Mycoplasma pneumoniae), consider antibiotic de-escalation to target atypical bacterial infection.    MRSA Screen, PCR (Inpatient) - Swab, Nares [[id number]]  (Normal) Collected: 04/02/23 1512    Lab Status: Final result Specimen: Swab from Nares Updated: 04/02/23 1636     MRSA PCR No MRSA Detected    Narrative:      The negative predictive value of this diagnostic test is high and should only be used to consider de-escalating anti-MRSA therapy. A positive result may indicate colonization with MRSA and must be correlated clinically.    Wound Culture - Aspirate, Forehead [[id number]]  (Abnormal)  (Susceptibility) Collected: 04/01/23 1120    Lab Status: Preliminary result Specimen: Aspirate from Forehead Updated: 04/04/23 0716     Wound Culture Heavy growth (4+)  Serratia marcescens     Gram Stain Many (4+) WBCs seen      No organisms seen    Narrative:      Pip/tazo to follow     Susceptibility      Serratia marcescens      STALIN (Preliminary)      Cefepime Susceptible      Ceftazidime Susceptible      Ceftriaxone Susceptible      Gentamicin Susceptible      Levofloxacin Susceptible      Tetracycline Resistant     Trimethoprim + Sulfamethoxazole Susceptible                       Susceptibility Comments     Serratia marcescens    Cefazolin sensitivity will not be reported for Enterobacteriaceae in non-urine isolates. If cefazolin is preferred, please call the microbiology lab to request an E-test.  With the exception of urinary-sourced infections, aminoglycosides should not be used as monotherapy.             Urine Culture - Urine, Urine, Clean Catch [[id number]]  (Normal) Collected: 03/31/23 2346    Lab Status: Final result Specimen: Urine, Clean Catch Updated: 04/02/23 1051     Urine Culture No growth    AFB Culture - Cerebrospinal Fluid, Lumbar Puncture [[id number]] Collected: 03/31/23 1450    Lab Status: Preliminary result Specimen: Cerebrospinal Fluid from Lumbar Puncture Updated: 04/01/23 1320     AFB Stain No acid fast bacilli seen on direct smear    Culture, CSF - Cerebrospinal Fluid, Lumbar Puncture [[id number]] Collected: 03/31/23 1450    Lab Status: Final result Specimen: Cerebrospinal Fluid from Lumbar Puncture Updated: 04/04/23 0736     CSF Culture No growth at 3 days     Gram Stain No No organisms seen      Occasional WBCs seen    Blood Culture - Blood, Arm, Right [[id number]]  (Normal) Collected: 03/31/23 1251    Lab Status: Preliminary result Specimen: Blood from Arm, Right Updated: 04/04/23 1330     Blood Culture No growth at 4 days    Blood Culture - Blood, Arm, Left [[id number]]  (Normal) Collected: 03/31/23 1249    Lab Status: Preliminary result Specimen: Blood from Arm, Left Updated: 04/04/23 1330     Blood Culture No growth at 4 days         Recent films:  CT  Head Without Contrast    Result Date: 4/4/2023  EXAMINATION: CT head without contrast 4/4/2023  HISTORY: Status post craniectomy and washout  DOSE: 850 A centimeter. All CT scans are performed using dose optimization techniques as appropriate to the performed exam and including at least one of the following: Automated exposure control, adjustment of the mA and/or kV according to size, and the use of the iterative reconstruction technique..  FINDINGS: Today's exam is compared to previous study of 3/31/2023. Multiple contiguous axial images are obtained from the skull base to the vertex per protocol without intravenous contrast enhancement with reformatted images obtained in the sagittal and coronal projections from the original data set.  The patient had undergone previous cranioplasty. The patient has undergone craniectomy with removal of the previous artificial flap. The previously noted collection both superficial and deep to the cranioplasty has been evacuated. Postoperative air is present. There is no evidence of fluid or hemorrhage within the subdural space. A surgical drain is in place within the scalp soft tissues. There is encephalomalacia within the right frontal lobe unchanged from previous exam. Mild edema is noted within the right frontal cortex with asymmetric sulcal effacement in comparison with the left frontal cortex.      Impression: 1.. Status post removal of the graft from the previous right frontal cranioplasty. The fluid collection both superficial and deep to the cranioplasty has been drained and debrided with no residual collection identified. A surgical drain as well as postoperative air is present. There is no evidence of hemorrhage or fluid within the subdural space at this time. Again noted are encephalomalacia changes within the right frontal lobe. There is mild edema within the right frontal cortex with asymmetric sulcal effacement in comparison with the contralateral frontal lobe. No  evidence of intra-axial hemorrhage or mass effect. This report was finalized on 04/04/2023 13:35 by Dr. Zack Mendoza MD.    CT Chest Without Contrast Diagnostic    Result Date: 4/3/2023  EXAMINATION: CT CHEST WO CONTRAST DIAGNOSTIC-   4/3/2023 5:46 AM CDT  HISTORY: COPD, surveillance; Z74.09-Other reduced mobility  In order to have a CT radiation dose as low as reasonably achievable Automated Exposure Control was utilized for adjustment of the mA and/or KV according to patient size.  DLP in mGycm= 339  The CT scan of the chest is performed without intravenous contrast enhancement.  The images are acquired in axial plane and subsequent reconstruction in coronal and sagittal planes.  Comparison is made with the previous study dated 06/24/2022.  The lungs are poorly expanded with significant respiratory motion artifacts.  There are groundglass opacities/infiltrate in the right upper lobe anterior segment which were not seen in the previous study.  There is mild-to-moderate pulmonary vascular congestion. This is moderate to more progressive since the previous study and partly may be due to poor lung expansion.  There is a small bibasal pleural effusion which was not noted in the previous study.  There is a small pleural-based nodule in the right upper lobe anteriorly, image #58 in series 4, measuring 5 mm in greatest dimension. This is unchanged. A focal pleural thickening/pleural based nodule is seen in the left lower lobe anterolaterally, image #126 in series 4 which is also similar to the previous study. It measures 6 mm at the base. No change.  The central airways patent. No endobronchial abnormality or nodule.  The limited visualized soft tissues of the neck are unremarkable. There is moderate lobulated fullness of the thyroid gland. No discrete nodule is identified. However due to absence of contrast enhancement and isodense nodule may not be excluded.  No axillary lymphadenopathy.  Severe atheromatous changes  of the abdominal aorta is seen. No aneurysmal dilatation.  Severe atheromatous changes of coronary arteries.  There is significant dilatation of central pulmonary arteries suggesting pulmonary arterial hypertension.  Atheromatous changes of the coronary arteries.  Limited visualized unenhanced abdomen is unremarkable except for lobulation/nodules in both adrenal glands, left more than the right. Incompletely visualized significantly distended gallbladder seen. No radiopaque stone in the visualized gallbladder. The pancreas and kidneys are incompletely visualized and not evaluated.  Images reviewed in bone window show no acute bony abnormality or a bone lesion.      Impression: 1. Groundglass opacity/nodule in the upper lobes may represent an evolving infiltrate. 2. Pulmonary vascular congestion and small bibasal pleural effusion. 3. Pulmonary arterial hypertension. 4. Small pleural-based nodules in the lungs bilaterally are stable since the previous study and probably represent chronic inflammatory process. No features to suggest lung malignancy.    This report was finalized on 04/03/2023 07:35 by Dr. Michelle Vigil MD.     Personal review of imaging: Reviewed current imaging studies.  Patient has evolving infiltrate in the lung and may have some effusion.  Pulmonary vascular congestion and pulmonary arterial hypertension was noted.      Pulmonary Assessment:  1. Acute on chronic hypoxic respiratory failure  2. Bilateral pulm infiltrate with leukocytosis  3. Community-acquired pneumonia versus interstitial pneumonitis  4. Pulmonary edema and volume overload  5. Chronic congestive diastolic heart failure  6. Atrial fibrillation  7. COPD with history of tobacco abuse  8. Meningioma s/p craniotomy.    9. S/p washout craniotomy and cranioplasty with flap infection  10. Exploration of craniotomy flap for infection   11. Hypertension  12. Diabetes mellitus insulin-dependent  13. Coronary artery disease  14. Obesity  likely obstructive sleep apnea  15. Seizure disorder  16. Chronic pain    Recommend/plan:   · Patient was taken to surgery by Dr. Portillo today and the craniotomy flap was cleaned up and a cranioplasty and surgery was done.  He has a history dressing on the head today.    · He remains off oxygen but gets confused at times.  He is using BiPAP at night  · We will continue on steroids.  Solu-Medrol dose decreased to 40 mg twice daily.   ·  His atrial fibrillation is better.  Cardiology is following.  · Use oxygen if needed and keep oxygen saturation more than 92%.  His shortness of breath is improved  · Continue bronchodilator treatment routine respiratory care.    · Plan for outpatient pulmonary clinic follow-up and PFT and sleep study when he is more stable  · He may be able to get a BiPAP or CPAP before discharge.  Continue other treatment as before.  · DVT and stress ulcer prophylaxis.  Surgical wound care.  Pain and anxiety control.  · Nutritional support.  Physical therapy and Occupational Therapy.  · CODE STATUS: Full.  Overall prognosis: Guarded but he appears to be stable  · We will follow    This visit was performed by both a physician and an [facility] RN.  I personally evaluated and examined the patient.  I performed all aspects of the medical decision making as documented.    Electronically signed by     Tracy Gupta MD,  Pulmonologist/Intensivist   4/4/2023, 19:00 CDT

## 2023-04-04 NOTE — PROGRESS NOTES
[facility] Medicine Services  INPATIENT PROGRESS NOTE    Patient Name: Elijah Escobar  Date of Admission: 3/31/2023  Today's Date: 04/04/23  Length of Stay: 4  Primary Care Physician: Brianna Martinez APRN    Subjective   Chief Complaint: Consult for medical management.   HPI   Heart rates have not been as good as they have been recently.  His family cannot come today before his surgery so he has been anxious about that according to the nursing staff.  He is about to get his morning medication with sips prior to surgery.    He is ready to get surgery over with.  He hopes that he only has to stay 2-3 days after surgery.  I told him that that would be up to Dr. Portillo.    Review of Systems   All pertinent negatives and positives are as above. All other systems have been reviewed and are negative unless otherwise stated.     Objective    Temp:  [97.1 °F (36.2 °C)-98.3 °F (36.8 °C)] 98.3 °F (36.8 °C)  Heart Rate:  [] 115  Resp:  [18-22] 18  BP: ()/(61-95) 115/71  Physical Exam  Constitutional:       Appearance: He is obese. He is ill-appearing (chronically).      Comments: Up in bed.  No distress.  Watching television.  No family present.  Discussed with his nurse, Stacy.   HENT:      Head: Normocephalic.      Comments: Bandaging to the right frontotemporal area.  Eyes:      Conjunctiva/sclera: Conjunctivae normal.      Pupils: Pupils are equal, round, and reactive to light.   Neck:      Vascular: No JVD.   Cardiovascular:      Rate and Rhythm: Tachycardia present. Rhythm irregular.      Heart sounds: Normal heart sounds.      Comments: In atrial fibrillation with rates in the 110s, 120s.  Pulmonary:      Effort: Pulmonary effort is normal. No respiratory distress.      Breath sounds: Rales present. No wheezing.      Comments: On room air.   Abdominal:      General: Bowel sounds are normal. There is no distension.      Palpations: Abdomen is soft.      Tenderness:  There is no abdominal tenderness.   Musculoskeletal:         General: Swelling (trace) present. No tenderness or deformity. Normal range of motion.   Skin:     General: Skin is warm and dry.      Findings: No rash.   Neurological:      Mental Status: He is alert and oriented to person, place, and time.      Cranial Nerves: No cranial nerve deficit.      Motor: No abnormal muscle tone.      Deep Tendon Reflexes: Reflexes normal.   Psychiatric:         Mood and Affect: Mood normal.         Behavior: Behavior normal.         Thought Content: Thought content normal.         Judgment: Judgment normal.         Intake/Output Summary (Last 24 hours) at 4/4/2023 0820  Last data filed at 4/4/2023 0757  Gross per 24 hour   Intake 1192.1 ml   Output 1150 ml   Net 42.1 ml     Results Review:  I have reviewed the labs, radiology results, and diagnostic studies.    Laboratory Data:   Results from last 7 days   Lab Units 04/04/23  0805 04/03/23  0436 04/02/23  0613   WBC 10*3/mm3 14.40* 11.00* 17.92*   HEMOGLOBIN g/dL 11.6* 11.0* 11.9*   HEMATOCRIT % 34.8* 34.5* 36.6*   PLATELETS 10*3/mm3 234 188 192     Results from last 7 days   Lab Units 04/04/23  0745 04/03/23  0436 04/02/23  0613 04/01/23  0559 04/01/23  0435   SODIUM mmol/L 136 139 135*   < > 137   POTASSIUM mmol/L 3.3* 3.6 4.4   < > 3.9   CHLORIDE mmol/L 98 102 99   < > 99   CO2 mmol/L 21.0* 23.0 15.0*   < > 16.0*   BUN mg/dL 42* 36* 39*   < > 34*   CREATININE mg/dL 1.36* 1.16 1.17   < > 1.20   CALCIUM mg/dL 8.3* 8.6 8.3*   < > 8.7   BILIRUBIN mg/dL 0.5 0.7  --   --  1.2   ALK PHOS U/L 85 89  --   --  111   ALT (SGPT) U/L 10 11  --   --  11   AST (SGOT) U/L 13 22  --   --  28   GLUCOSE mg/dL 343* 102* 331*   < > 261*    < > = values in this interval not displayed.     Culture Data:   Blood Culture   Date Value Ref Range Status   03/31/2023 No growth at 3 days  Preliminary   03/31/2023 No growth at 3 days  Preliminary     Urine Culture   Date Value Ref Range Status    03/31/2023 No growth  Final     Wound Culture   Date Value Ref Range Status   04/01/2023 Heavy growth (4+) Serratia marcescens (A)  Preliminary     Respiratory Culture   Date Value Ref Range Status   04/03/2023 Rejected  Preliminary     I have reviewed the patient's current medications.     Assessment/Plan   Assessment  Active Hospital Problems    Diagnosis    • **Swelling of scalp    • Post-operative infection    • Meningioma s/p craniotomy    • Type 2 diabetes mellitus with hyperglycemia and peripheral neuropathy, with long-term current use of insulin (HCC)    • Acute systolic heart failure    • Type 2 myocardial infarction due to arrhythmia    • Acute pulmonary edema due to A-fib RVR    • Paroxysmal atrial fibrillation with rapid ventricular response    • Coronary artery disease    • COPD (chronic obstructive pulmonary disease)    • Obesity (BMI 30-39.9)    • Tobacco abuse, in remission    • ADDIE (obstructive sleep apnea)    • Acute respiratory failure with hypoxia      Treatment Plan  The patient was admitted on 3/31 by Dr. Portillo with the neurosurgical service.  He has a history of craniotomy in June 2022 that became infected and required washout in July 2022.  He then required a craniectomy in October 2022.  He presented and had a right frontal scalp fluid collection.  Spinal fluid was clean.  Surgical intervention was planned, but the patient had other issues going on including uncontrolled hyperglycemia and atrial fibrillation with rapid ventricular response.  Hospital medicine was consulted to assist with the above.    The scalp was also aspirated and he ultimately grew Serratia.  This was resistant to tetracycline.  He has been receiving vancomycin and Zosyn.  I would like to de-escalate his antibiotic coverage to ceftriaxone monotherapy today.    He has been seen by cardiology.  He has had some difficulties with paroxysmal atrial fibrillation with rapid ventricular response.  He is on therapeutic  Lovenox and aspirin.  Efforts have been made at rate control with Cardizem CD and digoxin. Echocardiogram showed an EF of 46-50%.  He also had some hypokinesis of some segments.  Study was also technically difficult.  Currently on oral Lasix.  Not presently on ACE/ARB.  Not presently on Toprol-XL/carvedilol, but metoprolol to tartrate.    He has been seen by pulmonology.  His pulmonary infiltrates are likely more secondary to CHF than pneumonia.  He has been on antibiotics to cover his scalp infection.  Pulmonology also has him on IV steroids, which have been exacerbating his blood sugars.  Continue efforts at pulmonary toilet.  Wean oxygen as tolerated.    He has a history of diabetes.  His hemoglobin A1c was 11 in October 2022.  I will update this.  He is on Levemir in the home setting.  He at one point was on an insulin drip.  He is now on long-acting insulin and sliding scale insulin.  Most recent glucoses have been 104, 161, 191.    DVT prophylaxis is achieved by being on therapeutic Lovenox.    Medical Decision Making  Number and Complexity of problems: The main issue requiring hospitalization was 1 acute, moderately complex problem in the form of his scalp infection where he has previously had craniotomy to remove any meningioma.  Differential Diagnosis: Bacterial versus fungal infection.    Conditions and Status        Condition is improving.     [facility] Data  External documents reviewed: Reviewed prior [facility] records.  Cardiac tracing (EKG, telemetry) interpretation: In atrial fibrillation on telemetry at present.  Radiology interpretation: No recent studies.  Reviewed admission studies.  Labs reviewed: As above.  Any tests that were considered but not ordered: None considered at present.     Decision rules/scores evaluated (example [id number], Wells, etc): [id number] score is 4.     Discussed with: The patient and his nurse, Stacy.     Care Planning  Shared decision making: Consented to admission for  further treatment.  Code status and discussions: Full with full interventions.    Disposition  Social Determinants of Health that impact treatment or disposition: Nothing negative identified at present.  I expect the patient to be discharged to per the neurosurgical service.      Electronically signed by Joey Moore DO, 04/04/23, 08:30 CDT.

## 2023-04-04 NOTE — PLAN OF CARE
Goal Outcome Evaluation:         Alert and oriented x4. In a fib, heart rate 100-115. O2 sats 97% on room air. Wore bipap overnight, satting at 100%. Transferred with standby assist to bedside commode. BP WNL.

## 2023-04-04 NOTE — ANESTHESIA PREPROCEDURE EVALUATION
Anesthesia Evaluation     Patient summary reviewed and Nursing notes reviewed   no history of anesthetic complications:  NPO Solid Status: > 8 hours  NPO Liquid Status: > 8 hours           Airway   Mallampati: I  TM distance: >3 FB  Neck ROM: full  No difficulty expected  Dental      Pulmonary    (+) a smoker Current,   (-) COPD, sleep apnea  Cardiovascular   Exercise tolerance: poor (<4 METS)    ECG reviewed    (+) hypertension, CAD (coronary calcification on CT), dysrhythmias (with RVR) Atrial Fib, PVD, hyperlipidemia,     ROS comment: Echo:  •  Left ventricular systolic function is mildly decreased. Left ventricular ejection fraction appears to be 46 - 50%.  •  Left ventricular wall thickness is consistent with mild to moderate concentric hypertrophy.  •  The following left ventricular wall segments are hypokinetic: apical anterior, apical lateral, apical inferior, apical septal, apex hypokinetic and mid anteroseptal.  •  Left atrial volume is mildly increased.  •  Estimated right ventricular systolic pressure from tricuspid regurgitation is mildly elevated (35-45 mmHg).  •  Normal size and function the right ventricle.  •  No significant (greater than mild) valvular pathology.  •  Mild dilation of the aortic root is present.  •  Compared to prior exam from 6/19/2022, both studies were technically difficult, but upon my independent review of the previous exam, there did appear to be some mild apical hypokinesis present on that study that does look slightly more prominent on current exam.           Neuro/Psych  (+) seizures,    (-) TIA, CVA    ROS Comment: S/P craniotomy for meningioma, s/p craniectomy with washout with subsequent cranioplasty, now presents with infection of cranial plate  GI/Hepatic/Renal/Endo    (+) morbid obesity, GERD,  renal disease CRI, diabetes mellitus type 2 using insulin,   (-) liver disease    Musculoskeletal     Abdominal    Substance History      OB/GYN          Other                           Anesthesia Plan    ASA 3     general     (Need repeat BS prior to OR, may require Insulin infusion intraop and better rate control for afib.)  intravenous induction     Anesthetic plan, risks, benefits, and alternatives have been provided, discussed and informed consent has been obtained with: patient.        CODE STATUS:

## 2023-04-04 NOTE — PROGRESS NOTES
Neurosurgery Daily Progress Note    HPI:  Elijah Escobar is a 67 y.o. male with a significant medical history of hypertension, diabetes, hyperlipidemia, seizures, craniotomy for tumor (6/16/2022), craniotomy for washout (7/1/2022), craniectomy (10/4/2022), coronary artery disease, diabetes, erectile dysfunction, GERD, hypertension, hyperlipidemia, tobacco abuse, and obesity.  He presents today with a complaint of a 4-5 days onset of progressively worsening right frontal, temporal, and periorbital edema and associated symptoms to include, but not limited to generalized fatigue, chills, dyspnea, intermittent nausea without vomiting, and states he's felt feverish.  Physical exam findings of neurologically intact with right frontal, temporal, and periorbital swelling.  WBC elevated at 15.  3 to an CRP elevated at 14.75.  Imaging pending.    Assessment:   Past Medical History:   Diagnosis Date   • Brain tumor    • Coronary artery disease    • COVID-19 vaccine series completed     MODERNA X 3; LAST DOSE 3/2022   • Diabetes    • Erectile dysfunction    • GERD (gastroesophageal reflux disease)    • Hypercholesteremia    • Hypertension    • Seizure      Active Hospital Problems    Diagnosis    • **Swelling of scalp    • COPD (chronic obstructive pulmonary disease)    • Obesity (BMI 30-39.9)    • Tobacco abuse, in remission    • Acute systolic heart failure    • ADDIE (obstructive sleep apnea)    • Type 2 myocardial infarction due to arrhythmia    • Acute pulmonary edema due to A-fib RVR    • Acute respiratory failure with hypoxia    • Post-operative infection    • Paroxysmal atrial fibrillation with rapid ventricular response    • Type 2 diabetes mellitus with hyperglycemia and peripheral neuropathy, with long-term current use of insulin (HCC)    • Coronary artery disease    • Meningioma s/p craniotomy      Plan:   Neuro: Stable, intact  Infected cranial flap   Plan for OR tomorrow, 4/4/2023 if cleared by hospitalist   Flap  "tapped cultures 4+ gram-negative bacilli   Day of Surgery (4/4/2023) craniectomy and washout.   Surgical clearance per hospitalist.  Appreciate assistance.    CV: AFib with RVR and heart strain and trop bump   Stabilized    Appreciate Cards  Pulm: Possible PNA with desats overnight   Vapotherm and CPAP   Abx per hospitalist  :  No patel.  UTI.  FEN: Reg diet for now   Fluid balance for sepsis vs pulmonary edema   N.p.o. since midnight  Endocrine: SSI with blood glucose in 300s.     Consider Insulin gtt  GI: SHERIF  ID: WBC 11 ? 17, CRP and ESR elevated, afebrile overnight   PNA   Infected cranial flap   UTI with cultures pending.    CSF: no growth to date   Cont Zosyn and vancomycin  Heme:  DVT prophylaxis  Pain: w no complaints at present  Dispo: PT/OT    Pending OR    Chief complaint:   4-5 days onset of progressively worsening right frontal, temporal, and periorbital edema and associated symptoms to include, but not limited to generalized fatigue, chills, dyspnea, intermittent nausea without vomiting, and states he's felt feverish.    HPI  Subjective  Doing well    Temp:  [97.1 °F (36.2 °C)-98.3 °F (36.8 °C)] 98.3 °F (36.8 °C)  Heart Rate:  [] 115  Resp:  [18-22] 18  BP: ()/(61-95) 115/71    Output by Drain (mL) 04/03/23 0701 - 04/03/23 1900 04/03/23 1901 - 04/04/23 0700 04/04/23 0701 - 04/04/23 0910 Range Total   Patient has no LDAs of requested type attached.     Objective:  Vital signs: (most recent): Blood pressure 115/71, pulse 115, temperature 98.3 °F (36.8 °C), temperature source Oral, resp. rate 18, height 177.8 cm (70\"), weight 112 kg (245 lb 14.4 oz), SpO2 95 %.        Neurologic Exam     Mental Status   Oriented to person, place, and time.   Speech: speech is normal   Level of consciousness: alert    Cranial Nerves     CN III, IV, VI   Pupils are equal, round, and reactive to light.  Extraocular motions are normal.     CN V   Facial sensation intact.     CN VII   Facial expression full, " symmetric.     Motor Exam   Right arm pronator drift: absent  Left arm pronator drift: absent    Strength   Right deltoid: 5/5  Left deltoid: 5/5  Right biceps: 5/5  Left biceps: 5/5  Right triceps: 5/5  Left triceps: 5/5  Right iliopsoas: 5/5  Left iliopsoas: 5/5  Right quadriceps: 5/5  Left quadriceps: 5/5  Right gastroc: 5/5  Left gastroc: 5/5    Sensory Exam   Light touch normal.     Drains: * No LDAs found *    Imaging Results (Last 24 Hours)     ** No results found for the last 24 hours. **        Lab Results (last 24 hours)     Procedure Component Value Units Date/Time    Hemoglobin A1c [[id number]] Collected: 04/04/23 0805    Specimen: Blood Updated: 04/04/23 0817    Comprehensive Metabolic Panel [[id number]]  (Abnormal) Collected: 04/04/23 0745    Specimen: Blood Updated: 04/04/23 0815     Glucose 343 mg/dL      BUN 42 mg/dL      Creatinine 1.36 mg/dL      Sodium 136 mmol/L      Potassium 3.3 mmol/L      Comment: Slight hemolysis detected by analyzer. Results may be affected.        Chloride 98 mmol/L      CO2 21.0 mmol/L      Calcium 8.3 mg/dL      Total Protein 6.9 g/dL      Albumin 3.0 g/dL      ALT (SGPT) 10 U/L      AST (SGOT) 13 U/L      Comment: Slight hemolysis detected by analyzer. Results may be affected.        Alkaline Phosphatase 85 U/L      Total Bilirubin 0.5 mg/dL      Globulin 3.9 gm/dL      A/G Ratio 0.8 g/dL      BUN/Creatinine Ratio 30.9     Anion Gap 17.0 mmol/L      eGFR 57.0 mL/min/1.73     Narrative:      GFR Normal >60  Chronic Kidney Disease <60  Kidney Failure <15      CBC (No Diff) [[id number]]  (Abnormal) Collected: 04/04/23 0805    Specimen: Blood Updated: 04/04/23 0814     WBC 14.40 10*3/mm3      RBC 3.82 10*6/mm3      Hemoglobin 11.6 g/dL      Hematocrit 34.8 %      MCV 91.1 fL      MCH 30.4 pg      MCHC 33.3 g/dL      RDW 12.0 %      RDW-SD 40.1 fl      MPV 11.2 fL      Platelets 234 10*3/mm3     Culture, CSF - Cerebrospinal Fluid, Lumbar Puncture [[id number]] Collected:  03/31/23 1450    Specimen: Cerebrospinal Fluid from Lumbar Puncture Updated: 04/04/23 0736     CSF Culture No growth at 3 days     Gram Stain No No organisms seen      Occasional WBCs seen    Wound Culture - Aspirate, Forehead [[id number]]  (Abnormal)  (Susceptibility) Collected: 04/01/23 1120    Specimen: Aspirate from Forehead Updated: 04/04/23 0716     Wound Culture Heavy growth (4+) Serratia marcescens     Gram Stain Many (4+) WBCs seen      No organisms seen    Narrative:      Pip/tazo to follow     Susceptibility      Serratia marcescens      STALIN (Preliminary)      Cefepime Susceptible      Ceftazidime Susceptible      Ceftriaxone Susceptible      Gentamicin Susceptible      Levofloxacin Susceptible      Tetracycline Resistant     Trimethoprim + Sulfamethoxazole Susceptible                       Susceptibility Comments     Serratia marcescens    Cefazolin sensitivity will not be reported for Enterobacteriaceae in non-urine isolates. If cefazolin is preferred, please call the microbiology lab to request an E-test.  With the exception of urinary-sourced infections, aminoglycosides should not be used as monotherapy.             Respiratory Culture - Sputum, Cough [[id number]] Collected: 04/03/23 1826    Specimen: Sputum from Cough Updated: 04/04/23 0348     Respiratory Culture Rejected     Gram Stain Rare (1+) WBCs seen      Few (2+) Epithelial cells seen      Few (2+) Yeast with hyphae seen      Rare (1+) Gram positive cocci    Narrative:      Specimen rejected due to oropharyngeal contamination. Please reorder and recollect specimen if clinically necessary.    POC Glucose Once [[id number]]  (Abnormal) Collected: 04/03/23 1956    Specimen: Blood Updated: 04/03/23 2008     Glucose 191 mg/dL      Comment: : [id number] [facility] NicolasMeter ID: [id number]       POC Glucose Once [[id number]]  (Abnormal) Collected: 04/03/23 1654    Specimen: Blood Updated: 04/03/23 1705     Glucose 161 mg/dL      Comment: :  [id number] Damon Guaman AMeter ID: [id number]       Blood Culture - Blood, Arm, Right [[id number]]  (Normal) Collected: 03/31/23 1251    Specimen: Blood from Arm, Right Updated: 04/03/23 1330     Blood Culture No growth at 3 days    Blood Culture - Blood, Arm, Left [[id number]]  (Normal) Collected: 03/31/23 1249    Specimen: Blood from Arm, Left Updated: 04/03/23 1330     Blood Culture No growth at 3 days    POC Glucose Once [[id number]]  (Normal) Collected: 04/03/23 1252    Specimen: Blood Updated: 04/03/23 1303     Glucose 104 mg/dL      Comment: : [id number] Erica Gutierrez ID: [id number]       POC Glucose Once [[id number]]  (Normal) Collected: 04/03/23 1200    Specimen: Blood Updated: 04/03/23 1211     Glucose 87 mg/dL      Comment: : [id number] Damon Rowena AMeter ID: [id number]       POC Glucose Once [[id number]]  (Normal) Collected: 04/03/23 1136    Specimen: Blood Updated: 04/03/23 1147     Glucose 81 mg/dL      Comment: : [id number] Damon Rowena AMeter ID: [id number]       POC Glucose Once [[id number]]  (Abnormal) Collected: 04/03/23 1022    Specimen: Blood Updated: 04/03/23 1033     Glucose 137 mg/dL      Comment: : [id number] Damon Rowena AMeter ID: [id number]       POC Glucose Once [[id number]]  (Abnormal) Collected: 04/03/23 0908    Specimen: Blood Updated: 04/03/23 0919     Glucose 167 mg/dL      Comment: : [id number] [place] Rowena AMeter ID: [id number]           I have personally reviewed this patient's history of present illness, physical exam, medical decision making and/or plan of care in detail and have made updates and/or changes to the electronic health record as deemed necessary, otherwise, there has been no changes as documented.    [id number]  Rahul Arnold, APRN

## 2023-04-04 NOTE — PLAN OF CARE
Goal Outcome Evaluation:  Plan of Care Reviewed With: patient, family        Progress: improving  Outcome Evaluation: Resting quietly @ present. Had cranioplasty this shift and experienced much confusion and agitation post op. This has resolved at this time. Vital signs stable. JUAN drain continues in place with serosanguinous drainage. Gauze dressing remains c/d/i. No s/s respiratory distress today. Has been on 02@2L NC to room air this shift. Spoke with family (Brina and Jacquie) and updated on patient's condition. Brina to have pt.'s nephew bring in his cranial helmet as requested per MD. Safety maintained. Will continue to monitor.

## 2023-04-04 NOTE — PROGRESS NOTES
"Pharmacy Dosing Service  Pharmacokinetics  Vancomycin Follow-up Evaluation    Assessment/Action/Plan:  Current Order: Vancomycin 1250 mg IVPB every 12 hours  Current end date:4/7/2023  Levels: Vancomycin trough ordered before dose due on 4/5/2023 at 0600  Previously on 4/3/2023 0436 Vancomycin trough = 10.3 (10.75 hours post 1000 mg dose)  Additional antimicrobial agent(s): Zosyn    Continue Vancomycin 1250 mg iv q12h. Pharmacy will continue to follow daily and adjust dose accordingly.     Subjective:  Elijah Escobar is a 67 y.o. male currently on Vancomycin for the treatment of intra cranial infection, day 4 of 7 of treatment.    AUC Model Data:  Regimen: 1250 mg IV every 12 hours.  Exposure target: AUC24 (range)400-600 mg/L.hr   AUC24,ss: 509 mg/L.hr  PAUC*: 86 %  Ctrough,ss: 16.6 mg/L  Pconc*: 31 %  Tox.: 12 %      Objective:  Ht: 177.8 cm (70\"); Wt: 112 kg (245 lb 14.4 oz)  Estimated Creatinine Clearance: 77.4 mL/min (by C-G formula based on SCr of 1.16 mg/dL).   Creatinine   Date Value Ref Range Status   04/03/2023 1.16 0.76 - 1.27 mg/dL Final   04/02/2023 1.17 0.76 - 1.27 mg/dL Final   04/01/2023 1.26 0.76 - 1.27 mg/dL Final   08/31/2022 0.80 0.60 - 1.30 mg/dL Final     Comment:     Serial Number: [id number]:  [id number]   10/21/2020 1.03 0.60 - 1.30 mg/dL Final   02/07/2020 1.00 0.60 - 1.30 mg/dL Final   02/07/2020 1.00 0.60 - 1.30 mg/dL Final      Lab Results   Component Value Date    WBC 11.00 (H) 04/03/2023    WBC 17.92 (H) 04/02/2023    WBC 19.05 (H) 04/01/2023         Lab Results   Component Value Date    VANCOTROUGH 10.30 04/03/2023       Culture Results:  Microbiology Results (last 10 days)       Procedure Component Value - Date/Time    Respiratory Culture - Sputum, Cough [[id number]] Collected: 04/03/23 182    Lab Status: Preliminary result Specimen: Sputum from Cough Updated: 04/04/23 0348     Respiratory Culture Rejected     Gram Stain Rare (1+) WBCs seen      Few (2+) Epithelial cells seen      " Few (2+) Yeast with hyphae seen      Rare (1+) Gram positive cocci    Narrative:      Specimen rejected due to oropharyngeal contamination. Please reorder and recollect specimen if clinically necessary.    Legionella Antigen, Urine - Urine, Urine, Clean Catch [[id number]]  (Normal) Collected: 04/02/23 2148    Lab Status: Final result Specimen: Urine, Clean Catch Updated: 04/02/23 2213     LEGIONELLA ANTIGEN, URINE Negative    S. Pneumo Ag Urine or CSF - Urine, Urine, Clean Catch [[id number]]  (Normal) Collected: 04/02/23 2148    Lab Status: Final result Specimen: Urine, Clean Catch Updated: 04/02/23 2214     Strep Pneumo Ag Negative    Respiratory Panel PCR w/COVID-19(SARS-CoV-2) [facility] In-House, NP Swab in UTM/VTM, 3-4 HR TAT - Swab, Nasopharynx [[id number]]  (Normal) Collected: 04/02/23 1512    Lab Status: Final result Specimen: Swab from Nasopharynx Updated: 04/02/23 1608     ADENOVIRUS, PCR Not Detected     Coronavirus 229E Not Detected     Coronavirus HKU1 Not Detected     Coronavirus NL63 Not Detected     Coronavirus OC43 Not Detected     COVID19 Not Detected     Human Metapneumovirus Not Detected     Human Rhinovirus/Enterovirus Not Detected     Influenza A PCR Not Detected     Influenza B PCR Not Detected     Parainfluenza Virus 1 Not Detected     Parainfluenza Virus 2 Not Detected     Parainfluenza Virus 3 Not Detected     Parainfluenza Virus 4 Not Detected     RSV, PCR Not Detected     Bordetella pertussis pcr Not Detected     Bordetella parapertussis PCR Not Detected     Chlamydophila pneumoniae PCR Not Detected     Mycoplasma pneumo by PCR Not Detected    Narrative:      In the setting of a positive respiratory panel with a viral infection PLUS a negative procalcitonin without other underlying concern for bacterial infection, consider observing off antibiotics or discontinuation of antibiotics and continue supportive care. If the respiratory panel is positive for atypical bacterial  infection (Bordetella pertussis, Chlamydophila pneumoniae, or Mycoplasma pneumoniae), consider antibiotic de-escalation to target atypical bacterial infection.    MRSA Screen, PCR (Inpatient) - Swab, Nares [[id number]]  (Normal) Collected: 04/02/23 1512    Lab Status: Final result Specimen: Swab from Nares Updated: 04/02/23 1636     MRSA PCR No MRSA Detected    Narrative:      The negative predictive value of this diagnostic test is high and should only be used to consider de-escalating anti-MRSA therapy. A positive result may indicate colonization with MRSA and must be correlated clinically.    Wound Culture - Aspirate, Forehead [[id number]]  (Abnormal)  (Susceptibility) Collected: 04/01/23 1120    Lab Status: Preliminary result Specimen: Aspirate from Forehead Updated: 04/04/23 0716     Wound Culture Heavy growth (4+) Serratia marcescens     Gram Stain Many (4+) WBCs seen      No organisms seen    Narrative:      Pip/tazo to follow     Susceptibility        Serratia marcescens      STALIN (Preliminary)      Cefepime Susceptible      Ceftazidime Susceptible      Ceftriaxone Susceptible      Gentamicin Susceptible      Levofloxacin Susceptible      Tetracycline Resistant      Trimethoprim + Sulfamethoxazole Susceptible                       Susceptibility Comments       Serratia marcescens    Cefazolin sensitivity will not be reported for Enterobacteriaceae in non-urine isolates. If cefazolin is preferred, please call the microbiology lab to request an E-test.  With the exception of urinary-sourced infections, aminoglycosides should not be used as monotherapy.               Urine Culture - Urine, Urine, Clean Catch [[id number]]  (Normal) Collected: 03/31/23 2346    Lab Status: Final result Specimen: Urine, Clean Catch Updated: 04/02/23 1051     Urine Culture No growth    AFB Culture - Cerebrospinal Fluid, Lumbar Puncture [[id number]] Collected: 03/31/23 1450    Lab Status: Preliminary result Specimen: Cerebrospinal Fluid  from Lumbar Puncture Updated: 04/01/23 1320     AFB Stain No acid fast bacilli seen on direct smear    Culture, CSF - Cerebrospinal Fluid, Lumbar Puncture [[id number]] Collected: 03/31/23 1450    Lab Status: Final result Specimen: Cerebrospinal Fluid from Lumbar Puncture Updated: 04/04/23 0736     CSF Culture No growth at 3 days     Gram Stain No No organisms seen      Occasional WBCs seen    Blood Culture - Blood, Arm, Right [[id number]]  (Normal) Collected: 03/31/23 1251    Lab Status: Preliminary result Specimen: Blood from Arm, Right Updated: 04/03/23 1330     Blood Culture No growth at 3 days    Blood Culture - Blood, Arm, Left [[id number]]  (Normal) Collected: 03/31/23 1249    Lab Status: Preliminary result Specimen: Blood from Arm, Left Updated: 04/03/23 1330     Blood Culture No growth at 3 days            Mk Stahl, PharmD   04/04/23 07:47 CDT

## 2023-04-04 NOTE — PROGRESS NOTES
RT EQUIPMENT DEVICE RELATED - SKIN ASSESSMENT    RT Medical Equipment/Device:     Nasal Cannula    Skin Assessment:      Nose:  Intact    Device Skin Pressure Protection:  Skin-to-device areas padded:  Hydrocolloid nasal strips on bridge of nose    Nurse Notification:  Kylie Coates, RRT

## 2023-04-04 NOTE — ANESTHESIA PROCEDURE NOTES
Airway  Urgency: elective    Date/Time: 4/4/2023 10:33 AM  Airway not difficult    General Information and Staff    Patient location during procedure: OR  CRNA/CAA: Awais Haile CRNA    Indications and Patient Condition  Indications for airway management: airway protection    Preoxygenated: yes  MILS maintained throughout  Mask difficulty assessment: 2 - vent by mask + OA or adjuvant +/- NMBA    Final Airway Details  Final airway type: endotracheal airway      Successful airway: ETT  Cuffed: yes   Successful intubation technique: video laryngoscopy  Facilitating devices/methods: intubating stylet  Endotracheal tube insertion site: oral  Blade: Candelario  Blade size: 4  ETT size (mm): 8.0  Cormack-Lehane Classification: grade I - full view of glottis  Placement verified by: chest auscultation and capnometry   Cuff volume (mL): 5  Measured from: lips  ETT/EBT  to lips (cm): 24  Number of attempts at approach: 1  Assessment: lips, teeth, and gum same as pre-op and atraumatic intubation

## 2023-04-04 NOTE — OP NOTE
NEUROSURGERY OPERATIVE NOTE    Elijah Escobar  OR Date: 4/4/2023    Pre-op Diagnosis:   S/P craniotomy [Z98.890]  Infection of deep incisional surgical site after procedure, initial encounter [T81.42XA]    Post-op Diagnosis:     Post-Op Diagnosis Codes:     * S/P craniotomy [Z98.890]     * Infection of deep incisional surgical site after procedure, initial encounter [T81.42XA]          Surgeon(s):  Flaco Portillo MD    Anesthesia: General    Staff:   Circulator: Ricky Sams RN; Francoise Demarco RN  Scrub Person: Hesham Perry    Procedure(s):  CRANIOPLASTY, removal, right, horseshoe    INDICATIONS FOR PROCEDURE:   Elijah Escobar is a 67 y.o. male with a significant medical history of hypertension, diabetes, hyperlipidemia, seizures, craniotomy for tumor (6/16/2022), craniotomy for washout (7/1/2022), craniectomy (10/4/2022), coronary artery disease, diabetes, erectile dysfunction, GERD, hypertension, hyperlipidemia, tobacco abuse, and obesity.  He presents today with a complaint of a 4-5 days onset of progressively worsening right frontal, temporal, and periorbital edema and associated symptoms to include, but not limited to generalized fatigue, chills, dyspnea, intermittent nausea without vomiting, and states he's felt feverish.  Physical exam findings of neurologically intact with right frontal, temporal, and periorbital swelling.  WBC elevated at 15.  3 to an CRP elevated at 14.75.  MRI of the brain showed a subgaleal fluid collection surrounding his previous cranioplasty.  Additionally needle drainage of his frontal region was performed which showed positive for Serratia.  LP showed no evidence of CNS infection.    We discussed the risks of a  bifrontal removal of infected cranioplasty, including but not limited to infection, bleeding, damage to local structures, CSF leak, seizures, hydrocephalus, weakness, numbness, paralysis, or loss of bowel and bladder function, stroke, coma, MI, or  death.    DESCRIPTION OF OPERATION AND FINDINGS:   On the day of surgery, he was brought to the preoperative holding area where IV access was obtained. Prophylactic intravenous antibiotics were administered. He was then brought to the major operative suite. While on the [facility], the patient underwent an uneventful induction of general anesthetic with placement of endotracheal tube, TEDs, SCD hoses, and a Jeong catheter were applied.      The patient was placed in the supine position on a standard operating table.  All pressure points were inspected and appropriately padded, and he was secured to the table.  The head was turned slightly to the left. And the patient was placed in a [place] 3-point fixation head carter to at least 75lbs.      The patient was placed on a horseshoe head carter and secured.  The hair was then clipped along the incision line. The entire scalp was prepped with alcohol, Betadine, and DuraPrep and allowed to dry for 3 minutes.  He was then draped in the usual fashion.  At this point a WHO surgical timeout was performed with all members of the surgical team.      There was a small area just anterior to the tragus that a previously opened up and was draining deolres purulent drainage.    The previous previous incision was opened with a 10 blade.  Bovie was then used to go to the periosteal layer.  Using a periosteum the previous bicoronal skin flap was elevated.  We then cut into the temporalis muscle and lifted anteriorly.  Delores purulence was encountered.  This was both on top of and underneath the cranioplasty.  A screwdriver was brought into the field and the bur hole covers were then removed from the skull.  All foreign bodies were removed completely and set aside.  Given that we had previously had positive cultures nothing was sent for further culture.    At this point the dura was inspected and found to be intact.  We carefully inspected all areas near sinuses and there appeared  to be no connection between the sinuses.  Dry Ray-Mona's were then used to scrape off any exudate or bioburden.  We then irrigated with 2 L of normal saline.  We then placed vancomycin into the wound.  A 7 [place] flat JUAN was then tunneled from posterior and placed over the dura. The muscle fascia galea was closed with interrupted 2-0 Vicryl sutures.  4-0 Monocryl was used for skin closure.  Incision was dressed with Xeroform and the patient's head was wrapped..    There were no observed complications. The needle, sponge, and cottonoid counts were correct.        Estimated Blood Loss: 200ml    Complications: None    Implants:   Implant Name Type Inv. Item Serial No.  Lot No. LRB No. Used Action   CLIPAPPLR SCLP HEMO PRELD 1P/U STRL - [id number] Implant CLIPAPPLR SCLP HEMO PRELD 1P/U STRL  [employer] J AND J  N/A 1 Implanted   HEMOST ABS SURGICEL 4X8IN - [id number] Implant HEMOST ABS SURGICEL 4X8IN  [employer]  DIV OF  AND J [id number] N/A 1 Implanted   HEMOST ABS SURGIFOAM  8X12 10MM - [id number] Implant HEMOST ABS SURGIFOAM  8X12 10MM  [employer]  DIV OF J AND J [id number] N/A 1 Implanted   KT HEMOST ABS SURGIFOAM PORCN 1GRAM - [id number] Implant KT HEMOST ABS SURGIFOAM PORCN 1GRAM  [employer]  DIV OF  AND J [id number] N/A 1 Implanted       Specimens:                None      Drains:   Closed/Suction Drain 1 Right Scalp Bulb 7 Fr. (Active)

## 2023-04-04 NOTE — PROGRESS NOTES
Neurosurgery Daily Progress Note    HPI:  Elijah Escobar is a 67 y.o. male with a significant medical history of hypertension, diabetes, hyperlipidemia, seizures, craniotomy for tumor (6/16/2022), craniotomy for washout (7/1/2022), craniectomy (10/4/2022), coronary artery disease, diabetes, erectile dysfunction, GERD, hypertension, hyperlipidemia, tobacco abuse, and obesity.  He presents today with a complaint of a 4-5 days onset of progressively worsening right frontal, temporal, and periorbital edema and associated symptoms to include, but not limited to generalized fatigue, chills, dyspnea, intermittent nausea without vomiting, and states he's felt feverish.  Physical exam findings of neurologically intact with right frontal, temporal, and periorbital swelling.  WBC elevated at 15.  3 to an CRP elevated at 14.75.  Imaging pending.    Assessment:   Past Medical History:   Diagnosis Date   • Brain tumor    • Coronary artery disease    • COVID-19 vaccine series completed     MODERNA X 3; LAST DOSE 3/2022   • Diabetes    • Erectile dysfunction    • GERD (gastroesophageal reflux disease)    • Hypercholesteremia    • Hypertension    • Seizure      Active Hospital Problems    Diagnosis    • **Swelling of scalp    • COPD (chronic obstructive pulmonary disease)    • Obesity (BMI 30-39.9)    • Tobacco abuse, in remission    • Acute systolic heart failure    • ADDIE (obstructive sleep apnea)    • Type 2 myocardial infarction due to arrhythmia    • Acute pulmonary edema due to A-fib RVR    • Acute respiratory failure with hypoxia    • Post-operative infection    • Paroxysmal atrial fibrillation with rapid ventricular response    • Type 2 diabetes mellitus with hyperglycemia and peripheral neuropathy, with long-term current use of insulin (HCC)    • Coronary artery disease    • Meningioma s/p craniotomy      Plan:   Neuro: Stable, intact  Infected cranial flap      Day of Surgery (4/4/2023)  craniectomy and washout   Postop CT  "stable.  JUAN drain in place.  No acute abnormalities.   Flap tapped I&D cultures 4+ gram-negative bacilli  JUAN  Remain in ICU for close neurologic monitoring  Continue neuro exams per policy.  Call for decline    CV: AFib with RVR and heart strain and trop bump   Stabilized    Appreciate Cards  Pulm: Possible PNA with desats overnight   Vapotherm and CPAP   Abx per hospitalist  :  No patel.  UTI.  FEN: Reg diet for now   Fluid balance for sepsis vs pulmonary edema   N.p.o. since midnight  Endocrine: SSI with blood glucose in 300s.     Consider Insulin gtt  GI: SHERIF  ID: WBC 11 ? 17, CRP and ESR elevated, afebrile overnight   PNA   Infected cranial flap   UTI with cultures pending.    CSF: no growth to date   Cont Zosyn and vancomycin  Heme:  DVT prophylaxis  Pain: Tolerable at present  Dispo: PT/OT      Chief complaint:   4-5 days onset of progressively worsening right frontal, temporal, and periorbital edema and associated symptoms to include, but not limited to generalized fatigue, chills, dyspnea, intermittent nausea without vomiting, and states he's felt feverish.    HPI  Subjective  Doing well    Temp:  [97.1 °F (36.2 °C)-98.3 °F (36.8 °C)] 98.1 °F (36.7 °C)  Heart Rate:  [] 130  Resp:  [16-24] 21  BP: ()/(49-95) 128/68    Output by Drain (mL) 04/03/23 0701 - 04/03/23 1900 04/03/23 1901 - 04/04/23 0700 04/04/23 0701 - 04/04/23 1408 Range Total   Requested LDAs do not have output data documented.     Objective:  Vital signs: (most recent): Blood pressure 128/68, pulse (!) 130, temperature 98.1 °F (36.7 °C), temperature source Oral, resp. rate 21, height 177.8 cm (70\"), weight 112 kg (245 lb 14.4 oz), SpO2 93 %.        Neurologic Exam     Mental Status   Oriented to person, place, and time.   Speech: speech is normal   Level of consciousness: alert    Cranial Nerves     CN III, IV, VI   Pupils are equal, round, and reactive to light.  Extraocular motions are normal.     CN V   Facial sensation intact. "     CN VII   Facial expression full, symmetric.     Motor Exam   Right arm pronator drift: absent  Left arm pronator drift: absent    Strength   Right deltoid: 5/5  Left deltoid: 5/5  Right biceps: 5/5  Left biceps: 5/5  Right triceps: 5/5  Left triceps: 5/5  Right iliopsoas: 5/5  Left iliopsoas: 5/5  Right quadriceps: 5/5  Left quadriceps: 5/5  Right gastroc: 5/5  Left gastroc: 5/5    Sensory Exam   Light touch normal.     Drains: * No LDAs found *    Imaging Results (Last 24 Hours)     Procedure Component Value Units Date/Time    CT Head Without Contrast [[id number]] Collected: 04/04/23 1324     Updated: 04/04/23 1338    Narrative:      EXAMINATION: CT head without contrast 4/4/2023     HISTORY: Status post craniectomy and washout     DOSE: 850 A centimeter. All CT scans are performed using dose  optimization techniques as appropriate to the performed exam and  including at least one of the following: Automated exposure control,  adjustment of the mA and/or kV according to size, and the use of the  iterative reconstruction technique..     FINDINGS: Today's exam is compared to previous study of 3/31/2023.  Multiple contiguous axial images are obtained from the skull base to the  vertex per protocol without intravenous contrast enhancement with  reformatted images obtained in the sagittal and coronal projections from  the original data set.     The patient had undergone previous cranioplasty. The patient has  undergone craniectomy with removal of the previous artificial flap. The  previously noted collection both superficial and deep to the  cranioplasty has been evacuated. Postoperative air is present. There is  no evidence of fluid or hemorrhage within the subdural space. A surgical  drain is in place within the scalp soft tissues. There is  encephalomalacia within the right frontal lobe unchanged from previous  exam. Mild edema is noted within the right frontal cortex with  asymmetric sulcal effacement in  comparison with the left frontal cortex.       Impression:      1.. Status post removal of the graft from the previous right frontal  cranioplasty. The fluid collection both superficial and deep to the  cranioplasty has been drained and debrided with no residual collection  identified. A surgical drain as well as postoperative air is present.  There is no evidence of hemorrhage or fluid within the subdural space at  this time. Again noted are encephalomalacia changes within the right  frontal lobe. There is mild edema within the right frontal cortex with  asymmetric sulcal effacement in comparison with the contralateral  frontal lobe. No evidence of intra-axial hemorrhage or mass effect.  This report was finalized on 04/04/2023 13:35 by Dr. Zack Mendoza MD.        Lab Results (last 24 hours)     Procedure Component Value Units Date/Time    Respiratory Culture - Sputum, Cough [[id number]] Collected: 04/03/23 1826    Specimen: Sputum from Cough Updated: 04/04/23 1359     Respiratory Culture Rejected     Gram Stain Rare (1+) WBCs seen      Few (2+) Epithelial cells seen      Few (2+) Yeast with hyphae seen      Rare (1+) Gram positive cocci    Narrative:      Specimen rejected due to oropharyngeal contamination. Please reorder and recollect specimen if clinically necessary.    Blood Culture - Blood, Arm, Right [[id number]]  (Normal) Collected: 03/31/23 1251    Specimen: Blood from Arm, Right Updated: 04/04/23 1330     Blood Culture No growth at 4 days    Blood Culture - Blood, Arm, Left [[id number]]  (Normal) Collected: 03/31/23 1249    Specimen: Blood from Arm, Left Updated: 04/04/23 1330     Blood Culture No growth at 4 days    Anti-neutrophilic Cytoplasmic Antibody [[id number]] Collected: 04/02/23 1551    Specimen: Blood Updated: 04/04/23 1311     C-ANCA <1:20 titer      P-ANCA <1:20 titer      Comment: The presence of positive fluorescence exhibiting P-ANCA or C-ANCA  patterns alone is not specific for the  diagnosis of Wegener's  Granulomatosis (WG) or microscopic polyangiitis. Decisions about  treatment should not be based solely on ANCA IFA results.  The  International ANCA Group Consensus recommends follow up testing of  positive sera with both NC-3 and MPO-ANCA enzyme immunoassays. As  many as 5% serum samples are positive only by EIA.  Ref. AM J Clin Pathol 1999;111:507-513.        Atypical pANCA <1:20 titer      Comment: The atypical pANCA pattern has been observed in a significant  percentage of patients with ulcerative colitis, primary sclerosing  cholangitis and autoimmune hepatitis.       Narrative:      Performed at:  [address] [facility]  [id number]  : Fabricio Lo PhD, Phone:  [phone]    Antinuclear Antigen Antibody, IFA [[id number]] Collected: 04/03/23 0436    Specimen: Blood Updated: 04/04/23 1311     STEPHANIE Negative     Comment:                                      Negative   <1:80                                       Borderline  1:80                                       Positive   >1:80  ICAP nomenclature: AC-0  For more information about Hep-2 cell patterns use  ANApatterns.org, the official website for the International  Consensus on Antinuclear Antibody (STEPHANIE) Patterns (ICAP).       Narrative:      Performed at:  [address] [facility]  [id number]  : Fabricio Lo PhD, Phone:  [phone]    POC Glucose Once [[id number]]  (Abnormal) Collected: 04/04/23 1235    Specimen: Blood Updated: 04/04/23 1246     Glucose 339 mg/dL      Comment: : [id number] Cleveland Ritchie ID: [id number]       Hemoglobin A1c [[id number]]  (Abnormal) Collected: 04/04/23 0805    Specimen: Blood Updated: 04/04/23 1132     Hemoglobin A1C 11.00 %     Narrative:      Hemoglobin A1C Ranges:    Increased Risk for Diabetes  5.7% to 6.4%  Diabetes                     >= 6.5%  Diabetic Goal                < 7.0%    POC Glucose Once [[id number]]   (Abnormal) Collected: 04/04/23 1004    Specimen: Blood Updated: 04/04/23 1018     Glucose 329 mg/dL      Comment: : [id number] Rm LambertenMeter ID: [id number]       POC Glucose Once [[id number]]  (Abnormal) Collected: 04/04/23 0919    Specimen: Blood Updated: 04/04/23 0932     Glucose 328 mg/dL      Comment: MD  AT BSOperator: [id number] Rm LambertenMeter ID: [id number]       Comprehensive Metabolic Panel [[id number]]  (Abnormal) Collected: 04/04/23 0745    Specimen: Blood Updated: 04/04/23 0815     Glucose 343 mg/dL      BUN 42 mg/dL      Creatinine 1.36 mg/dL      Sodium 136 mmol/L      Potassium 3.3 mmol/L      Comment: Slight hemolysis detected by analyzer. Results may be affected.        Chloride 98 mmol/L      CO2 21.0 mmol/L      Calcium 8.3 mg/dL      Total Protein 6.9 g/dL      Albumin 3.0 g/dL      ALT (SGPT) 10 U/L      AST (SGOT) 13 U/L      Comment: Slight hemolysis detected by analyzer. Results may be affected.        Alkaline Phosphatase 85 U/L      Total Bilirubin 0.5 mg/dL      Globulin 3.9 gm/dL      A/G Ratio 0.8 g/dL      BUN/Creatinine Ratio 30.9     Anion Gap 17.0 mmol/L      eGFR 57.0 mL/min/1.73     Narrative:      GFR Normal >60  Chronic Kidney Disease <60  Kidney Failure <15      CBC (No Diff) [[id number]]  (Abnormal) Collected: 04/04/23 0805    Specimen: Blood Updated: 04/04/23 0814     WBC 14.40 10*3/mm3      RBC 3.82 10*6/mm3      Hemoglobin 11.6 g/dL      Hematocrit 34.8 %      MCV 91.1 fL      MCH 30.4 pg      MCHC 33.3 g/dL      RDW 12.0 %      RDW-SD 40.1 fl      MPV 11.2 fL      Platelets 234 10*3/mm3     Culture, CSF - Cerebrospinal Fluid, Lumbar Puncture [[id number]] Collected: 03/31/23 1450    Specimen: Cerebrospinal Fluid from Lumbar Puncture Updated: 04/04/23 0736     CSF Culture No growth at 3 days     Gram Stain No No organisms seen      Occasional WBCs seen    Wound Culture - Aspirate, Forehead [[id number]]  (Abnormal)  (Susceptibility) Collected: 04/01/23 1120     Specimen: Aspirate from Forehead Updated: 04/04/23 0716     Wound Culture Heavy growth (4+) Serratia marcescens     Gram Stain Many (4+) WBCs seen      No organisms seen    Narrative:      Pip/tazo to follow     Susceptibility      Serratia marcescens      STALIN (Preliminary)      Cefepime Susceptible      Ceftazidime Susceptible      Ceftriaxone Susceptible      Gentamicin Susceptible      Levofloxacin Susceptible      Tetracycline Resistant     Trimethoprim + Sulfamethoxazole Susceptible                       Susceptibility Comments     Serratia marcescens    Cefazolin sensitivity will not be reported for Enterobacteriaceae in non-urine isolates. If cefazolin is preferred, please call the microbiology lab to request an E-test.  With the exception of urinary-sourced infections, aminoglycosides should not be used as monotherapy.             POC Glucose Once [[id number]]  (Abnormal) Collected: 04/03/23 1956    Specimen: Blood Updated: 04/03/23 2008     Glucose 191 mg/dL      Comment: : [id number] Malcolm NicolasMeter ID: [id number]       POC Glucose Once [[id number]]  (Abnormal) Collected: 04/03/23 1654    Specimen: Blood Updated: 04/03/23 1705     Glucose 161 mg/dL      Comment: : [id number] Damon Rowena Ravi ID: [id number]           I have personally reviewed this patient's history of present illness, physical exam, medical decision making and/or plan of care in detail and have made updates and/or changes to the electronic health record as deemed necessary, otherwise, there has been no changes as documented.    [id number]  Rahul Arnold, APRN

## 2023-04-04 NOTE — PLAN OF CARE
Goal Outcome Evaluation:    Problem: Noninvasive Ventilation Acute  Goal: Effective Unassisted Ventilation and Oxygenation  Outcome: Ongoing, Progressing

## 2023-04-04 NOTE — PROGRESS NOTES
LOS: 4 days   Patient Care Team:  Brianna Martinez APRN as PCP - General (Nurse Practitioner)  Rahul Arnold APRN as Nurse Practitioner (Nurse Practitioner)  Flaco Portillo MD as Surgeon (Neurosurgery)    Chief Complaint: Cardiology consulted for rapid atrial fibrillation     Subjective    Elijah Escobar is a 67 y.o. male who is being seen in follow-up  No new issues or events  Continues to be in atrial fibrillation  This morning rates between 100-110 bpm  Hemodynamically stable  No chest pain  No palpitation  No presyncope  No syncope  No orthopnea  No paroxysmal nocturnal dyspnea  Awaiting for surgery by Dr. Portillo later this morning  Being seen by other disciplines  Labs as referenced below reviewed  Mild hypokalemia  Creatinine slightly increased from baseline to 1.36    Telemetry: Atrial fibrillation with improved rate control with rates between 100-110 bpm.  No malignant arrhythmia.  No severe bradycardia or pauses    Review of Systems   Constitutional: No chills   Has fatigue   No fever.   HENT: Negative.    Eyes: Negative.    Respiratory: Negative for cough,   No chest wall soreness,   Shortness of breath,   no wheezing, no stridor.    Cardiovascular: As above  Gastrointestinal: Negative for abdominal distention,  No abdominal pain,   No blood in stool,   No constipation,   No diarrhea,   No nausea   No vomiting.   Endocrine: Negative.    Genitourinary: Negative for difficulty urinating, dysuria, flank pain and hematuria.   Musculoskeletal: Negative.    Skin: Negative for rash and wound.   Allergic/Immunologic: Negative.    Neurological: Negative for dizziness, syncope, weakness,   No light-headedness  No  headaches.   Hematological: Does not bruise/bleed easily.   Psychiatric/Behavioral: Negative for agitation or behavioral problems,   No confusion,   the patient is  nervous/anxious.       History:   Past Medical History:   Diagnosis Date   • Brain tumor    • Coronary artery disease    •  COVID-19 vaccine series completed     MODERNA X 3; LAST DOSE 3/2022   • Diabetes    • Erectile dysfunction    • GERD (gastroesophageal reflux disease)    • Hypercholesteremia    • Hypertension    • Seizure      Past Surgical History:   Procedure Laterality Date   • BALLOON ANGIOPLASTY, ARTERY Right    • COLONOSCOPY     • CRANIECTOMY Right 2022    Procedure: CRANIECTOMY WITH INCISIONAL WASHOUT;  Surgeon: Felipe Banerjee MD;  Location:  [facility];  Service: Neurosurgery;  Laterality: Right;   • CRANIOPLASTY Right 10/4/2022    Procedure: CRANIOPLASTY right with Lumbar drain;  Surgeon: Flaco Portillo MD;  Location:  [facility];  Service: Neurosurgery;  Laterality: Right;   • CRANIOTOMY FOR TUMOR Right 2022    Procedure: CRANIOTOMY FOR TUMOR STERIOTACTIC WITH BRAIN LAB, right;  Surgeon: Flaco Portillo MD;  Location:  [facility];  Service: Neurosurgery;  Laterality: Right;     Social History     Socioeconomic History   • Marital status:    Tobacco Use   • Smoking status: Former     Packs/day: 0.25     Years: 15.00     Pack years: 3.75     Types: Cigarettes     Quit date: 3/15/2023     Years since quittin.0   • Smokeless tobacco: Never   Vaping Use   • Vaping Use: Never used   Substance and Sexual Activity   • Alcohol use: Never   • Drug use: Never   • Sexual activity: Defer     Family History   Problem Relation Age of Onset   • Cancer Mother         liver   • Heart disease Father        Labs:  WBC WBC   Date Value Ref Range Status   2023 14.40 (H) 3.40 - 10.80 10*3/mm3 Final   2023 11.00 (H) 3.40 - 10.80 10*3/mm3 Final   2023 17.92 (H) 3.40 - 10.80 10*3/mm3 Final      HGB Hemoglobin   Date Value Ref Range Status   2023 11.6 (L) 13.0 - 17.7 g/dL Final   2023 11.0 (L) 13.0 - 17.7 g/dL Final   2023 11.9 (L) 13.0 - 17.7 g/dL Final      HCT Hematocrit   Date Value Ref Range Status   2023 34.8 (L) 37.5 - 51.0 % Final   2023 34.5 (L) 37.5 - 51.0 %  Final   04/02/2023 36.6 (L) 37.5 - 51.0 % Final      Platelets Platelets   Date Value Ref Range Status   04/04/2023 234 140 - 450 10*3/mm3 Final   04/03/2023 188 140 - 450 10*3/mm3 Final   04/02/2023 192 140 - 450 10*3/mm3 Final      MCV MCV   Date Value Ref Range Status   04/04/2023 91.1 79.0 - 97.0 fL Final   04/03/2023 95.0 79.0 - 97.0 fL Final   04/02/2023 92.7 79.0 - 97.0 fL Final        Results from last 7 days   Lab Units 04/04/23  0745 04/03/23  0436 04/02/23  0613 04/01/23  0559 04/01/23  0435   SODIUM mmol/L 136 139 135*   < > 137   POTASSIUM mmol/L 3.3* 3.6 4.4   < > 3.9   CHLORIDE mmol/L 98 102 99   < > 99   CO2 mmol/L 21.0* 23.0 15.0*   < > 16.0*   BUN mg/dL 42* 36* 39*   < > 34*   CREATININE mg/dL 1.36* 1.16 1.17   < > 1.20   CALCIUM mg/dL 8.3* 8.6 8.3*   < > 8.7   BILIRUBIN mg/dL 0.5 0.7  --   --  1.2   ALK PHOS U/L 85 89  --   --  111   ALT (SGPT) U/L 10 11  --   --  11   AST (SGOT) U/L 13 22  --   --  28   GLUCOSE mg/dL 343* 102* 331*   < > 261*    < > = values in this interval not displayed.     Lab Results   Component Value Date    TROPONINT 409 (C) 04/01/2023     PT/INR:    No results found for: PROTIME/  No results found for: INR    Imaging Results (Last 72 Hours)     Procedure Component Value Units Date/Time    CT Chest Without Contrast Diagnostic [[id number]] Collected: 04/03/23 0723     Updated: 04/03/23 0738    Narrative:      EXAMINATION: CT CHEST WO CONTRAST DIAGNOSTIC-      4/3/2023 5:46 AM CDT     HISTORY: COPD, surveillance; Z74.09-Other reduced mobility     In order to have a CT radiation dose as low as reasonably achievable  Automated Exposure Control was utilized for adjustment of the mA and/or  KV according to patient size.     DLP in mGycm= 339     The CT scan of the chest is performed without intravenous contrast  enhancement.     The images are acquired in axial plane and subsequent reconstruction in  coronal and sagittal planes.     Comparison is made with the previous study  dated 06/24/2022.     The lungs are poorly expanded with significant respiratory motion  artifacts.     There are groundglass opacities/infiltrate in the right upper lobe  anterior segment which were not seen in the previous study.     There is mild-to-moderate pulmonary vascular congestion. This is  moderate to more progressive since the previous study and partly may be  due to poor lung expansion.     There is a small bibasal pleural effusion which was not noted in the  previous study.     There is a small pleural-based nodule in the right upper lobe  anteriorly, image #58 in series 4, measuring 5 mm in greatest dimension.  This is unchanged. A focal pleural thickening/pleural based nodule is  seen in the left lower lobe anterolaterally, image #126 in series 4  which is also similar to the previous study. It measures 6 mm at the  base. No change.     The central airways patent. No endobronchial abnormality or nodule.     The limited visualized soft tissues of the neck are unremarkable. There  is moderate lobulated fullness of the thyroid gland. No discrete nodule  is identified. However due to absence of contrast enhancement and  isodense nodule may not be excluded.     No axillary lymphadenopathy.     Severe atheromatous changes of the abdominal aorta is seen. No  aneurysmal dilatation.     Severe atheromatous changes of coronary arteries.     There is significant dilatation of central pulmonary arteries suggesting  pulmonary arterial hypertension.     Atheromatous changes of the coronary arteries.     Limited visualized unenhanced abdomen is unremarkable except for  lobulation/nodules in both adrenal glands, left more than the right.  Incompletely visualized significantly distended gallbladder seen. No  radiopaque stone in the visualized gallbladder. The pancreas and kidneys  are incompletely visualized and not evaluated.     Images reviewed in bone window show no acute bony abnormality or a bone  lesion.        Impression:      1. Groundglass opacity/nodule in the upper lobes may represent an  evolving infiltrate.  2. Pulmonary vascular congestion and small bibasal pleural effusion.  3. Pulmonary arterial hypertension.  4. Small pleural-based nodules in the lungs bilaterally are stable since  the previous study and probably represent chronic inflammatory process.  No features to suggest lung malignancy.           This report was finalized on 04/03/2023 07:35 by Dr. Michelle Vigil MD.          Objective     No Known Allergies    Medication Review: Performed  Current Facility-Administered Medications   Medication Dose Route Frequency Provider Last Rate Last Admin   • acetaminophen (TYLENOL) tablet 650 mg  650 mg Oral Q4H PRN Rahul Arnold APRN   650 mg at 04/02/23 0002    Or   • acetaminophen (TYLENOL) 160 MG/5ML solution 650 mg  650 mg Oral Q4H PRN Rahul Arnold APRN        Or   • acetaminophen (TYLENOL) suppository 650 mg  650 mg Rectal Q4H PRN Rahul Arnold APRN       • aspirin chewable tablet 81 mg  81 mg Oral Daily Rahul Arnold APRN   81 mg at 04/04/23 0830   • atorvastatin (LIPITOR) tablet 20 mg  20 mg Oral Nightly Rahul Arnold APRN   20 mg at 04/03/23 2007   • budesonide-formoterol (SYMBICORT) 160-4.5 MCG/ACT inhaler 2 puff  2 puff Inhalation BID - RT Tracy Gupta MD   2 puff at 04/04/23 0631   • butalbital-acetaminophen-caffeine (FIORICET, ESGIC) -40 MG per tablet 1 tablet  1 tablet Oral Q6H PRN Rahul Arnold APRN   1 tablet at 04/04/23 0614   • cefTRIAXone (ROCEPHIN) 1 g in sodium chloride 0.9 % 100 mL IVPB  1 g Intravenous Q24H Joey Moore DO       • Chlorhexidine Gluconate Cloth 2 % pads 1 application  1 application Topical Q24H Flaco Portillo MD   1 application at 04/04/23 0519   • dextrose (D50W) (25 g/50 mL) IV injection 25 g  25 g Intravenous Q15 Min PRN Ramin Singh, DO       • dextrose (GLUTOSE) oral gel 15 g  15 g Oral Q15 Min PRN Ramin Singh, DO       •  digoxin (LANOXIN) tablet 125 mcg  125 mcg Oral Daily Shubham Kc MD   125 mcg at 04/03/23 1147   • dilTIAZem (CARDIZEM) 125 mg in 125 mL NS infusion  5-15 mg/hr Intravenous Titrated Efra Hector DO   Stopped at 04/02/23 1600   • dilTIAZem CD (CARDIZEM CD) 24 hr capsule 240 mg  240 mg Oral Q24H Ramin Singh DO   240 mg at 04/04/23 0830   • Enoxaparin Sodium (LOVENOX) syringe 120 mg  1 mg/kg Subcutaneous Q12H Ramin Singh DO   120 mg at 04/03/23 2006   • furosemide (LASIX) tablet 20 mg  20 mg Oral Daily Ramin Singh DO   20 mg at 04/04/23 0830   • gabapentin (NEURONTIN) capsule 300 mg  300 mg Oral TID Rahul Arnold APRN   300 mg at 04/04/23 0830   • gadobenate dimeglumine (MULTIHANCE) injection 20 mL  20 mL Intravenous Once in imaging Flaco Portillo MD       • glucagon (human recombinant) (GLUCAGEN DIAGNOSTIC) injection 1 mg  1 mg Intramuscular Q15 Min PRN Ramin Singh DO       • HYDROmorphone (DILAUDID) injection 0.5 mg  0.5 mg Intravenous Q2H PRN Efra Hector DO   0.5 mg at 04/01/23 1543   • insulin detemir (LEVEMIR) injection 15 Units  15 Units Subcutaneous Q12H Ramin Singh DO   15 Units at 04/04/23 0819   • Insulin Lispro (humaLOG) injection 0-14 Units  0-14 Units Subcutaneous TID AC Ramin Singh DO   8 Units at 04/04/23 0818   • ipratropium (ATROVENT) nebulizer solution 0.5 mg  0.5 mg Nebulization 4x Daily - RT Alia Gonsalves APRN   0.5 mg at 04/04/23 0631   • levETIRAcetam (KEPPRA) tablet 500 mg  500 mg Oral BID Rahul Arnold APRN   500 mg at 04/04/23 0830   • methylPREDNISolone sodium succinate (SOLU-Medrol) injection 60 mg  60 mg Intravenous Q8H Sensarma, Sugata, MD   60 mg at 04/04/23 0314   • metoprolol tartrate (LOPRESSOR) tablet 50 mg  50 mg Oral Q12H Rahul Arnold APRN   50 mg at 04/04/23 0830   • mupirocin (BACTROBAN) 2 % nasal ointment 1 application  1 application Each Nare BID Flaco Portillo MD   1 application at  "04/04/23 0834   • nicotine (NICODERM CQ) 14 MG/24HR patch 1 patch  1 patch Transdermal Daily PRN Rahul Arnold APRN       • ondansetron (ZOFRAN) tablet 4 mg  4 mg Oral Q6H PRN Rahul Arnold APRN        Or   • ondansetron (ZOFRAN) injection 4 mg  4 mg Intravenous Q6H PRN FedericotRahul APRN       • oxyCODONE-acetaminophen (PERCOCET)  MG per tablet 1 tablet  1 tablet Oral Q4H PRN RustRahul APRN   1 tablet at 04/01/23 1958   • oxyCODONE-acetaminophen (PERCOCET) 7.5-325 MG per tablet 1 tablet  1 tablet Oral Q4H PRN RustRahul APRN   1 tablet at 04/02/23 2000   • pantoprazole (PROTONIX) EC tablet 40 mg  40 mg Oral Q AM RustRahul APRN   40 mg at 04/04/23 0538   • Pharmacy to Dose enoxaparin (LOVENOX)   Does not apply Continuous PRN Ramin Singh DO       • polyethylene glycol (MIRALAX) packet 17 g  17 g Oral Daily Rahul Arnold APRN   17 g at 04/03/23 0925   • sennosides-docusate (PERICOLACE) 8.6-50 MG per tablet 1 tablet  1 tablet Oral Daily Rahul Arnold APRN   1 tablet at 04/03/23 0924   • sodium chloride 0.9 % flush 10 mL  10 mL Intravenous Q12H Rahul Arnold APRN   10 mL at 04/03/23 2005   • sodium chloride 0.9 % flush 10 mL  10 mL Intravenous PRN RustRahul APRN       • sodium chloride 0.9 % infusion 40 mL  40 mL Intravenous PRN Rahul Arnold APRN           Vital Sign Min/Max for last 24 hours  Temp  Min: 97.1 °F (36.2 °C)  Max: 98.3 °F (36.8 °C)   BP  Min: 85/62  Max: 130/95   Pulse  Min: 87  Max: 125   Resp  Min: 18  Max: 22   SpO2  Min: 90 %  Max: 100 %   No data recorded   Weight  Min: 112 kg (245 lb 14.4 oz)  Max: 112 kg (245 lb 14.4 oz)     Flowsheet Rows    Flowsheet Row First Filed Value   Admission Height 177.8 cm (70\") Documented at 04/01/2023 1617   Admission Weight 117 kg (259 lb) Documented at 04/01/2023 1617          Results for orders placed during the hospital encounter of 03/31/23    Adult Transthoracic Echo Complete W/ Cont if Necessary Per " Protocol    Interpretation Summary  •  Left ventricular systolic function is mildly decreased. Left ventricular ejection fraction appears to be 46 - 50%.  •  Left ventricular wall thickness is consistent with mild to moderate concentric hypertrophy.  •  The following left ventricular wall segments are hypokinetic: apical anterior, apical lateral, apical inferior, apical septal, apex hypokinetic and mid anteroseptal.  •  Left atrial volume is mildly increased.  •  Estimated right ventricular systolic pressure from tricuspid regurgitation is mildly elevated (35-45 mmHg).  •  Normal size and function the right ventricle.  •  No significant (greater than mild) valvular pathology.  •  Mild dilation of the aortic root is present.  •  Compared to prior exam from 6/19/2022, both studies were technically difficult, but upon my independent review of the previous exam, there did appear to be some mild apical hypokinesis present on that study that does look slightly more prominent on current exam.      Physical Exam:    General Appearance: Awake, alert, in no acute distress  Eyes: Pupils equal and reactive    Ears: Appear intact with no abnormalities noted  Nose: Nares normal, no drainage  Neck: supple, trachea midline, no carotid bruit and no JVD  Back: no kyphosis present,    Lungs: respirations regular, respirations even and respirations unlabored  Heart: normal S1, S2, irregular, 2/6 systolic murmur left sternal border  No gallops or rubs  no rub and no click  Abdomen: normal bowel sounds, no tenderness   Skin: no bleeding, bruising or rash  Extremities: no cyanosis  Psychiatric/Behavioral: Negative for agitation, behavioral problems, confusion, the patient does  appear to be nervous/anxious.       Results Review:   I reviewed the patient's new clinical results.  I reviewed the patient's new imaging results and agree with the interpretation.  I reviewed the patient's other test results and agree with the interpretation  I  personally viewed and interpreted the patient's EKG/Telemetry data    Discussed with patient  Updated patient regarding any new or relevant abnormalities on review of records or any new findings on physical exam.   Mentioned to patient about purpose of visit and desirable health short and long term goals and objectives.     Reviewed available prior notes, consults, prior visits, laboratory findings, radiology and cardiology relevant reports.   Updated chart as applicable.   I have reviewed the patient's medical history in detail and updated the computerized patient record as relevant.          Assessment & Plan       Swelling of scalp    Meningioma s/p craniotomy    Coronary artery disease    Type 2 diabetes mellitus with hyperglycemia and peripheral neuropathy, with long-term current use of insulin (HCC)    Paroxysmal atrial fibrillation with rapid ventricular response    Post-operative infection    Acute pulmonary edema due to A-fib RVR    Acute respiratory failure with hypoxia    Type 2 myocardial infarction due to arrhythmia    COPD (chronic obstructive pulmonary disease)    Obesity (BMI 30-39.9)    Tobacco abuse, in remission    Acute systolic heart failure    ADDIE (obstructive sleep apnea)      Plan    He is awaiting surgery  Continue current medication  Yesterday added digoxin 125 mcg p.o. daily  Continues on beta-blocker as well as calcium channel blocker therapy  After surgery can further adjust dosages  Hemodynamically significant  Anticoagulation in future as directed by neurosurgery  Will see patient in follow-up  Recommend correcting hypokalemia  Can get oral potassium after surgery or IV replacement perioperatively  Monitor potassium and magnesium  Continue telemetry  DVT prophylaxis      Shubham Kc MD  04/04/23  08:41 CDT    EMR Dragon/Transcription was used to dictate part of this note M

## 2023-04-04 NOTE — ANESTHESIA POSTPROCEDURE EVALUATION
"Patient: Elijah Escobar    Procedure Summary     Date: 04/04/23 Room / Location:  [facility]  [facility]    Anesthesia Start: 1024 Anesthesia Stop: 1232    Procedure: CRANIOPLASTY, removal, right, horseshoe (Head) Diagnosis:       S/P craniotomy      Infection of deep incisional surgical site after procedure, initial encounter      (S/P craniotomy [Z98.890])      (Infection of deep incisional surgical site after procedure, initial encounter [T81.42XA])    Surgeons: Flaco Portillo MD Provider: Awais Haile CRNA    Anesthesia Type: general ASA Status: 3          Anesthesia Type: general    Vitals  Vitals Value Taken Time   /71 04/04/23 1305   Temp 97.3 °F (36.3 °C) 04/04/23 1305   Pulse 124 04/04/23 1305   Resp 20 04/04/23 1305   SpO2 95 % 04/04/23 1305           Post Anesthesia Care and Evaluation    Patient location during evaluation: PACU  Patient participation: complete - patient participated  Level of consciousness: awake and alert  Pain management: adequate    Airway patency: patent  Anesthetic complications: No anesthetic complications  PONV Status: none  Cardiovascular status: acceptable and hemodynamically stable  Respiratory status: acceptable  Hydration status: acceptable    Comments: Blood pressure 136/70, pulse 111, temperature 97.8 °F (36.6 °C), temperature source Oral, resp. rate 17, height 177.8 cm (70\"), weight 112 kg (245 lb 14.4 oz), SpO2 95 %.    Patient discharged from PACU based upon Manoj score. Please see RN notes for further details      "

## 2023-04-05 NOTE — PROGRESS NOTES
Neurosurgery Daily Progress Note    HPI:  Elijah Escobar is a 67 y.o. male with a significant medical history of hypertension, diabetes, hyperlipidemia, seizures, craniotomy for tumor (6/16/2022), craniotomy for washout (7/1/2022), craniectomy (10/4/2022), coronary artery disease, diabetes, erectile dysfunction, GERD, hypertension, hyperlipidemia, tobacco abuse, and obesity.  He presents today with a complaint of a 4-5 days onset of progressively worsening right frontal, temporal, and periorbital edema and associated symptoms to include, but not limited to generalized fatigue, chills, dyspnea, intermittent nausea without vomiting, and states he's felt feverish.  Physical exam findings of neurologically intact with right frontal, temporal, and periorbital swelling.  WBC elevated at 15.  3 to an CRP elevated at 14.75.  Imaging pending.    Assessment:   Past Medical History:   Diagnosis Date   • Brain tumor    • Coronary artery disease    • COVID-19 vaccine series completed     MODERNA X 3; LAST DOSE 3/2022   • Diabetes    • Erectile dysfunction    • GERD (gastroesophageal reflux disease)    • Hypercholesteremia    • Hypertension    • Seizure      Active Hospital Problems    Diagnosis    • **Swelling of scalp    • COPD (chronic obstructive pulmonary disease)    • Obesity (BMI 30-39.9)    • Tobacco abuse, in remission    • Acute systolic heart failure    • ADDIE (obstructive sleep apnea)    • Type 2 myocardial infarction due to arrhythmia    • Acute pulmonary edema due to A-fib RVR    • Acute respiratory failure with hypoxia    • Post-operative infection    • Paroxysmal atrial fibrillation with rapid ventricular response    • Type 2 diabetes mellitus with hyperglycemia and peripheral neuropathy, with long-term current use of insulin (HCC)    • Coronary artery disease    • Meningioma s/p craniotomy      Plan:   Neuro: Stable, intact/at baseline  Infected cranial flap      1 Day Post-Op (4/4/2023)  craniectomy and  "washout   Postop CT stable. No acute abnormalities.   Flap tapped I&D cultures 4+ gram-negative bacilli    Heavy 4+ Serratia marcescens   ID consulted.  Appreciate assistance.  JUAN = 65 mL, keep  Remain in ICU for close neurologic monitoring  Continue neuro exams per policy.  Call for decline    CV: AFib with RVR and heart strain and trop bump.  Stabilized    Appreciate Cards  Pulm: Possible PNA with desats overnight   Abx per hospitalist  :  No patel.  UTI.  FEN: Reg diet for now   Fluid balance for sepsis vs pulmonary edema   N.p.o. since midnight  Endocrine: Glucose > 400.  Glucomander protocol   GI: SHERIF  ID: WBC 11 ? 17, CRP and ESR elevated, afebrile overnight   PNA   Infected cranial flap   UTI with cultures pending.    CSF: no growth to date   Previously received Zosyn and vancomycin.  Currently on Zosyn and Rocephin   ID consulted  Heme:  DVT prophylaxis  Pain: Tolerable at present  Dispo: PT/OT      Chief complaint:   4-5 days onset of progressively worsening right frontal, temporal, and periorbital edema and associated symptoms to include, but not limited to generalized fatigue, chills, dyspnea, intermittent nausea without vomiting, and states he's felt feverish.    HPI  Subjective  Doing well    Temp:  [97.3 °F (36.3 °C)-98.2 °F (36.8 °C)] 97.9 °F (36.6 °C)  Heart Rate:  [] 107  Resp:  [12-28] 16  BP: ()/() 114/76    Output by Drain (mL) 04/04/23 0701 - 04/04/23 1900 04/04/23 1901 - 04/05/23 0700 04/05/23 0701 - 04/05/23 0811 Range Total   Closed/Suction Drain 1 Right Scalp Bulb 7 Fr. 40 25  65     Objective:  Vital signs: (most recent): Blood pressure 114/76, pulse 107, temperature 97.9 °F (36.6 °C), temperature source Oral, resp. rate 16, height 177.8 cm (70\"), weight 119 kg (263 lb 4.8 oz), SpO2 96 %.      Neurologic Exam     Mental Status   Oriented to person, place, and time.   Speech: speech is normal   Level of consciousness: alert    Oriented x3.  Follows commands without " prompting showing thumbs up and 2 fingers bilaterally.     Cranial Nerves     CN III, IV, VI   Pupils are equal, round, and reactive to light.  Extraocular motions are normal.     CN V   Facial sensation intact.     CN VII   Facial expression full, symmetric.     Motor Exam   Right arm pronator drift: absent  Left arm pronator drift: absent    Strength   Right deltoid: 5/5  Left deltoid: 5/5  Right biceps: 5/5  Left biceps: 5/5  Right triceps: 5/5  Left triceps: 5/5  Right iliopsoas: 5/5  Left iliopsoas: 5/5  Right quadriceps: 5/5  Left quadriceps: 5/5  Right gastroc: 5/5  Left gastroc: 5/5  Moves all extremities equal and symmetric     Sensory Exam   Light touch normal.     Drains: * No LDAs found *    Imaging Results (Last 24 Hours)     Procedure Component Value Units Date/Time    CT Head Without Contrast [[id number]] Collected: 04/04/23 1324     Updated: 04/04/23 1338    Narrative:      EXAMINATION: CT head without contrast 4/4/2023     HISTORY: Status post craniectomy and washout     DOSE: 850 A centimeter. All CT scans are performed using dose  optimization techniques as appropriate to the performed exam and  including at least one of the following: Automated exposure control,  adjustment of the mA and/or kV according to size, and the use of the  iterative reconstruction technique..     FINDINGS: Today's exam is compared to previous study of 3/31/2023.  Multiple contiguous axial images are obtained from the skull base to the  vertex per protocol without intravenous contrast enhancement with  reformatted images obtained in the sagittal and coronal projections from  the original data set.     The patient had undergone previous cranioplasty. The patient has  undergone craniectomy with removal of the previous artificial flap. The  previously noted collection both superficial and deep to the  cranioplasty has been evacuated. Postoperative air is present. There is  no evidence of fluid or hemorrhage within the  subdural space. A surgical  drain is in place within the scalp soft tissues. There is  encephalomalacia within the right frontal lobe unchanged from previous  exam. Mild edema is noted within the right frontal cortex with  asymmetric sulcal effacement in comparison with the left frontal cortex.       Impression:      1.. Status post removal of the graft from the previous right frontal  cranioplasty. The fluid collection both superficial and deep to the  cranioplasty has been drained and debrided with no residual collection  identified. A surgical drain as well as postoperative air is present.  There is no evidence of hemorrhage or fluid within the subdural space at  this time. Again noted are encephalomalacia changes within the right  frontal lobe. There is mild edema within the right frontal cortex with  asymmetric sulcal effacement in comparison with the contralateral  frontal lobe. No evidence of intra-axial hemorrhage or mass effect.  This report was finalized on 04/04/2023 13:35 by Dr. Zack Mendoza MD.        Lab Results (last 24 hours)     Procedure Component Value Units Date/Time    POC Glucose Once [[id number]]  (Abnormal) Collected: 04/05/23 0643    Specimen: Blood Updated: 04/05/23 0655     Glucose 442 mg/dL      Comment: : [id number] Scott KapoorieMeter ID: [id number]       Basic Metabolic Panel [[id number]]  (Abnormal) Collected: 04/05/23 0304    Specimen: Blood Updated: 04/05/23 0427     Glucose 408 mg/dL      BUN 46 mg/dL      Creatinine 1.42 mg/dL      Sodium 134 mmol/L      Potassium 3.7 mmol/L      Comment: Slight hemolysis detected by analyzer. Results may be affected.        Chloride 97 mmol/L      CO2 20.0 mmol/L      Calcium 8.0 mg/dL      BUN/Creatinine Ratio 32.4     Anion Gap 17.0 mmol/L      eGFR 54.2 mL/min/1.73     Narrative:      GFR Normal >60  Chronic Kidney Disease <60  Kidney Failure <15      POC Glucose Once [[id number]]  (Abnormal) Collected: 04/04/23 2120    Specimen: Blood  Updated: 04/04/23 2131     Glucose 371 mg/dL      Comment: : [id number] Scott KapoorieMeter ID: [id number]       POC Glucose Once [[id number]]  (Abnormal) Collected: 04/04/23 1325    Specimen: Blood Updated: 04/04/23 1759     Glucose 294 mg/dL      Comment: : [id number] Felipe HeatherMeter ID: [id number]       POC Glucose Once [[id number]]  (Abnormal) Collected: 04/04/23 0806    Specimen: Blood Updated: 04/04/23 1758     Glucose 291 mg/dL      Comment: : [id number] Felipe HeatherMeter ID: [id number]       POC Glucose Once [[id number]]  (Abnormal) Collected: 04/04/23 1722    Specimen: Blood Updated: 04/04/23 1734     Glucose 330 mg/dL      Comment: : [id number] Felipe HeatherMeter ID: [id number]       Respiratory Culture - Sputum, Cough [[id number]] Collected: 04/03/23 1826    Specimen: Sputum from Cough Updated: 04/04/23 1359     Respiratory Culture Rejected     Gram Stain Rare (1+) WBCs seen      Few (2+) Epithelial cells seen      Few (2+) Yeast with hyphae seen      Rare (1+) Gram positive cocci    Narrative:      Specimen rejected due to oropharyngeal contamination. Please reorder and recollect specimen if clinically necessary.    Blood Culture - Blood, Arm, Right [[id number]]  (Normal) Collected: 03/31/23 1251    Specimen: Blood from Arm, Right Updated: 04/04/23 1330     Blood Culture No growth at 4 days    Blood Culture - Blood, Arm, Left [[id number]]  (Normal) Collected: 03/31/23 1249    Specimen: Blood from Arm, Left Updated: 04/04/23 1330     Blood Culture No growth at 4 days    Anti-neutrophilic Cytoplasmic Antibody [[id number]] Collected: 04/02/23 1551    Specimen: Blood Updated: 04/04/23 1311     C-ANCA <1:20 titer      P-ANCA <1:20 titer      Comment: The presence of positive fluorescence exhibiting P-ANCA or C-ANCA  patterns alone is not specific for the diagnosis of Wegener's  Granulomatosis (WG) or microscopic polyangiitis. Decisions about  treatment should not be based solely on ANCA IFA  results.  The  International ANCA Group Consensus recommends follow up testing of  positive sera with both KY-3 and MPO-ANCA enzyme immunoassays. As  many as 5% serum samples are positive only by EIA.  Ref. AM J Clin Pathol 1999;111:507-513.        Atypical pANCA <1:20 titer      Comment: The atypical pANCA pattern has been observed in a significant  percentage of patients with ulcerative colitis, primary sclerosing  cholangitis and autoimmune hepatitis.       Narrative:      Performed at:  [address]  [id number]  : Fabricio Lo PhD, Phone:  [phone]    Antinuclear Antigen Antibody, IFA [[id number]] Collected: 04/03/23 0436    Specimen: Blood Updated: 04/04/23 1311     STEPHANIE Negative     Comment:                                      Negative   <1:80                                       Borderline  1:80                                       Positive   >1:80  ICAP nomenclature: AC-0  For more information about Hep-2 cell patterns use  ANApatterns.org, the official website for the International  Consensus on Antinuclear Antibody (STEPHANIE) Patterns (ICAP).       Narrative:      Performed at:  [address]  [id number]  : Fabricio Lo PhD, Phone:  [phone]    POC Glucose Once [[id number]]  (Abnormal) Collected: 04/04/23 1235    Specimen: Blood Updated: 04/04/23 1246     Glucose 339 mg/dL      Comment: : [id number] Cleveland Qiu  Chau ID: [id number]       Hemoglobin A1c [[id number]]  (Abnormal) Collected: 04/04/23 0805    Specimen: Blood Updated: 04/04/23 1132     Hemoglobin A1C 11.00 %     Narrative:      Hemoglobin A1C Ranges:    Increased Risk for Diabetes  5.7% to 6.4%  Diabetes                     >= 6.5%  Diabetic Goal                < 7.0%    POC Glucose Once [[id number]]  (Abnormal) Collected: 04/04/23 1004    Specimen: Blood Updated: 04/04/23 1018     Glucose 329 mg/dL      Comment: : [id number]  Rm Han ID: [id number]       POC Glucose Once [[id number]]  (Abnormal) Collected: 04/04/23 0919    Specimen: Blood Updated: 04/04/23 0932     Glucose 328 mg/dL      Comment: MD  AT BSOperator: [id number] Rm Han ID: [id number]       Comprehensive Metabolic Panel [[id number]]  (Abnormal) Collected: 04/04/23 0745    Specimen: Blood Updated: 04/04/23 0815     Glucose 343 mg/dL      BUN 42 mg/dL      Creatinine 1.36 mg/dL      Sodium 136 mmol/L      Potassium 3.3 mmol/L      Comment: Slight hemolysis detected by analyzer. Results may be affected.        Chloride 98 mmol/L      CO2 21.0 mmol/L      Calcium 8.3 mg/dL      Total Protein 6.9 g/dL      Albumin 3.0 g/dL      ALT (SGPT) 10 U/L      AST (SGOT) 13 U/L      Comment: Slight hemolysis detected by analyzer. Results may be affected.        Alkaline Phosphatase 85 U/L      Total Bilirubin 0.5 mg/dL      Globulin 3.9 gm/dL      A/G Ratio 0.8 g/dL      BUN/Creatinine Ratio 30.9     Anion Gap 17.0 mmol/L      eGFR 57.0 mL/min/1.73     Narrative:      GFR Normal >60  Chronic Kidney Disease <60  Kidney Failure <15      CBC (No Diff) [[id number]]  (Abnormal) Collected: 04/04/23 0805    Specimen: Blood Updated: 04/04/23 0814     WBC 14.40 10*3/mm3      RBC 3.82 10*6/mm3      Hemoglobin 11.6 g/dL      Hematocrit 34.8 %      MCV 91.1 fL      MCH 30.4 pg      MCHC 33.3 g/dL      RDW 12.0 %      RDW-SD 40.1 fl      MPV 11.2 fL      Platelets 234 10*3/mm3         Rahul Arnold, APRN

## 2023-04-05 NOTE — PLAN OF CARE
Goal Outcome Evaluation:  Plan of Care Reviewed With: other (see comments)        Progress: no change  Outcome Evaluation: Ntn assessment completed based on diabetes educator consult. Pt s/p craniotomy yesterday. Pt with past medical history of diabetes, HgbA1c 11.2% this admission. CCHO diet. Boost Glucose Control BID. Follow for education needs as appropriate. Cont to follow for plan of care.

## 2023-04-05 NOTE — PLAN OF CARE
Goal Outcome Evaluation:  Plan of Care Reviewed With: patient        Progress: no change  Outcome Evaluation: PT re-eval completed s/p cranioplasty on 4/4. Pt alert and oriented x4, c/o mild headache and sitting EOB with OT upon arrival. Pt with no overt strength or sensation deficits in BLE. Pt performs all bed mob with CGA and HOB elev. Attempted to leatha cranial helmet over dressing/drain, but unable to do so due to bulk of dressing. Discussed with ADITYA Gallego and told that patient to not perform OOB activity without helmet. PT will hold until dressing is removed to allow for appropriate helmet fitting or if further orders are placed for OOB activity without helmet. Once cleared for activity, PT will continue current POC. Anticipate d/c home with assist.

## 2023-04-05 NOTE — PLAN OF CARE
Goal Outcome Evaluation:  Plan of Care Reviewed With: patient        Progress: no change  Outcome Evaluation: OT re-eval completed s/p R cranioplasty on 4/4. He presents alert and oriented x4, resting comfortably in bed on RA. He was able to bring self to sitting at EOB with CGA. Donned socks with set-up and CGA. B UE strength 5/5, coordination, sensation and vision all intact. He was able to scoot self towards HOB while seated at EOB with SBA/CGA. Attempted to don helmet over head dressing/drain, but unable to don cranial helmet d/t bulk of dressing. Discussed with Rahul Arnold, pt is not to be up out of bed without helmet. Therefore, OT will HOLD until dressing is removed for proper fitting of cranial helmet or until further orders are provided on OOB activity without helmet. Once cleared by neuro sx, will resume OT POC and all goals adjusted to adaquately reflect pt progress. Anticipate d/c home with assist.

## 2023-04-05 NOTE — PROGRESS NOTES
"Pharmacy Dosing Service  Anticoagulant  Enoxaparin     Assessment/Action/Plan:  Pharmacy to dose Enoxaparin for the indication of atrial fibrillation.   119 kg (263 lb 4.8 oz)  Continue Enoxaparin 120 mg (1mg/kg) sq every 12h. Pharmacy will continue to monitor renal function and adjust dose accordingly.       Subjective:  Elijah Escobar is a 67 y.o. male on Enoxaparin 120 mg SQ every 12 hours for indication of atrial fibrillation.  Objective:  [Ht: 177.8 cm (70\"); Wt: 119 kg (263 lb 4.8 oz); BMI: Body mass index is 37.78 kg/m².]  Estimated Creatinine Clearance: 65.3 mL/min (A) (by C-G formula based on SCr of 1.42 mg/dL (H)). No results found for: DDIMER   Lab Results   Component Value Date    INR 1.16 (H) 03/31/2023    INR 0.94 02/14/2023    INR 1.16 (H) 06/30/2022    PROTIME 14.9 (H) 03/31/2023    PROTIME 12.6 02/14/2023    PROTIME 14.3 06/30/2022      Lab Results   Component Value Date    HGB 11.6 (L) 04/04/2023    HGB 11.0 (L) 04/03/2023    HGB 11.9 (L) 04/02/2023      Lab Results   Component Value Date     04/04/2023     04/03/2023     04/02/2023       Mk Stahl, PharmD  04/05/23 10:31 CDT     "

## 2023-04-05 NOTE — THERAPY RE-EVALUATION
Patient Name: Elijah Escobar  : 1955    MRN: [id number]                              Today's Date: 2023       Admit Date: 3/31/2023    Visit Dx:     ICD-10-CM ICD-9-CM   1. S/P craniotomy  Z98.890 V45.89   2. Impaired mobility  Z74.09 799.89   3. Infection of deep incisional surgical site after procedure, initial encounter  T81.42XA 998.59     Patient Active Problem List   Diagnosis   • Meningioma s/p craniotomy   • Hypertension   • GERD (gastroesophageal reflux disease)   • Coronary artery disease   • Class 2 severe obesity due to excess calories with serious comorbidity and body mass index (BMI) of 38.0 to 38.9 in adult   • Type 2 diabetes mellitus with hyperglycemia and peripheral neuropathy, with long-term current use of insulin (HCC)   • Leukocytosis   • Other specified anemias   • Paroxysmal atrial fibrillation with rapid ventricular response   • Post-operative infection   • Smoker   • History of cranioplasty   • Facial edema   • Swelling of scalp   • Acute pulmonary edema due to A-fib RVR   • Acute respiratory failure with hypoxia   • Type 2 myocardial infarction due to arrhythmia   • COPD (chronic obstructive pulmonary disease)   • Obesity (BMI 30-39.9)   • Tobacco abuse, in remission   • Acute systolic heart failure   • ADDIE (obstructive sleep apnea)     Past Medical History:   Diagnosis Date   • Brain tumor    • Coronary artery disease    • COVID-19 vaccine series completed     MODERNA X 3; LAST DOSE 3/2022   • Diabetes    • Erectile dysfunction    • GERD (gastroesophageal reflux disease)    • Hypercholesteremia    • Hypertension    • Seizure      Past Surgical History:   Procedure Laterality Date   • BALLOON ANGIOPLASTY, ARTERY Right    • COLONOSCOPY     • CRANIECTOMY Right 2022    Procedure: CRANIECTOMY WITH INCISIONAL WASHOUT;  Surgeon: Felipe Banerjee MD;  Location: [facility];  Service: Neurosurgery;  Laterality: Right;   • CRANIOPLASTY Right 10/4/2022    Procedure: CRANIOPLASTY  right with Lumbar drain;  Surgeon: Flaco Portillo MD;  Location:  [facility];  Service: Neurosurgery;  Laterality: Right;   • CRANIOPLASTY N/A 4/4/2023    Procedure: CRANIOPLASTY, removal, right, horseshoe;  Surgeon: Flaco Portillo MD;  Location:  [facility];  Service: Neurosurgery;  Laterality: N/A;   • CRANIOTOMY FOR TUMOR Right 6/16/2022    Procedure: CRANIOTOMY FOR TUMOR STERIOTACTIC WITH BRAIN LAB, right;  Surgeon: Flaco Portillo MD;  Location:  [facility];  Service: Neurosurgery;  Laterality: Right;      General Information     Row Name 04/05/23 0900          Physical Therapy Time and Intention    Document Type re-evaluation  -SB     Mode of Treatment physical therapy  -SB     Row Name 04/05/23 0900          General Information    Patient Profile Reviewed yes  -SB     Existing Precautions/Restrictions fall;oxygen therapy device and L/min;other (see comments)   helmet AAT  -SB     Barriers to Rehab medically complex  -SB     Row Name 04/05/23 0900          Cognition    Orientation Status (Cognition) oriented x 4  -SB     Row Name 04/05/23 0900          Safety Issues, Functional Mobility    Impairments Affecting Function (Mobility) endurance/activity tolerance;shortness of breath  -SB           User Key  (r) = Recorded By, (t) = Taken By, (c) = Cosigned By    Initials Name Provider Type    Erika Lynn PT DPT Physical Therapist               Mobility     Row Name 04/05/23 0900          Bed Mobility    Sit-Supine [place] (Bed Mobility) contact guard  -SB     Assistive Device (Bed Mobility) head of bed elevated  -SB     Comment, (Bed Mobility) sitting EOB upon arrival- per OT, reqd CGA and HOB elev  -SB     Row Name 04/05/23 0900          Transfers    Comment, (Transfers) further mobility deferred due to waiting on clarification of helmet  -SB           User Key  (r) = Recorded By, (t) = Taken By, (c) = Cosigned By    Initials Name Provider Type    Erika Lynn PT DPT Physical  Therapist               Obj/Interventions     [facility] Name 04/05/23 0900          Range of Motion Comprehensive    General Range of Motion bilateral lower extremity ROM WFL  -SB     Row Name 04/05/23 0900          Strength Comprehensive (MMT)    General Manual Muscle Testing (MMT) Assessment no strength deficits identified  -SB     [facility] Name 04/05/23 0900          Balance    Balance Assessment sitting static balance;sitting dynamic balance  -SB     Static Sitting Balance standby assist  -SB     Dynamic Sitting Balance contact guard  -SB     Position, Sitting Balance unsupported;sitting edge of bed  -SB     [facility] Name 04/05/23 0900          Sensory Assessment (Somatosensory)    Sensory Assessment (Somatosensory) LE sensation intact  -SB           User Key  (r) = Recorded By, (t) = Taken By, (c) = Cosigned By    Initials Name Provider Type    Erika Lynn, PT DPT Physical Therapist               Goals/Plan     Row Name 04/05/23 0900          Bed Mobility Goal 1 (PT)    Activity/Assistive Device (Bed Mobility Goal 1, PT) sit to supine;supine to sit;rolling to left;rolling to right  -SB     [place] Level/Cues Needed (Bed Mobility Goal 1, PT) independent  -SB     Time Frame (Bed Mobility Goal 1, PT) long term goal (LTG)  -SB     Progress/Outcomes (Bed Mobility Goal 1, PT) new goal  -SB     [facility] Name 04/05/23 0900          Transfer Goal 1 (PT)    Activity/Assistive Device (Transfer Goal 1, PT) sit-to-stand/stand-to-sit;bed-to-chair/chair-to-bed  -SB     [place] Level/Cues Needed (Transfer Goal 1, PT) independent  -SB     Time Frame (Transfer Goal 1, PT) long term goal (LTG)  -SB     Progress/Outcome (Transfer Goal 1, PT) new goal  -SB     Row Name 04/05/23 0900          Gait Training Goal 1 (PT)    Activity/Assistive Device (Gait Training Goal 1, PT) gait (walking locomotion);increase endurance/gait distance;increase energy conservation;decrease fall risk;cane, straight  -SB     [place] Level (Gait Training  Goal 1, PT) standby assist  -SB     Distance (Gait Training Goal 1, PT) 150 feet  -SB     Time Frame (Gait Training Goal 1, PT) long term goal (LTG)  -SB     Progress/Outcome (Gait Training Goal 1, PT) goal revised this date;goal ongoing  -SB     Row Name 04/05/23 0900          Therapy Assessment/Plan (PT)    Planned Therapy Interventions (PT) bed mobility training;balance training;gait training;patient/family education;transfer training;strengthening;orthotic fitting/training  -SB           User Key  (r) = Recorded By, (t) = Taken By, (c) = Cosigned By    Initials Name Provider Type    SB Erika Javier, PT DPT Physical Therapist               Clinical Impression     Row Name 04/05/23 0900          Pain    Pretreatment Pain Rating 2/10  -SB     Pain Location - head  -SB     Pain Intervention(s) Medication (See MAR);Repositioned  -SB     Additional Documentation Pain Scale: Numbers Pre/Post-Treatment (Group)  -SB     Row Name 04/05/23 0900          Plan of Care Review    Plan of Care Reviewed With patient  -SB     Progress no change  -SB     Outcome Evaluation PT re-eval completed s/p cranioplasty on 4/4. Pt alert and oriented x4, c/o mild headache and sitting EOB with OT upon arrival. Pt with no overt strength or sensation deficits in BLE. Pt performs all bed mob with CGA and HOB elev. Attempted to leatha cranial helmet over dressing/drain, but unable to do so due to bulk of dressing. Discussed with ADITYA Gallego and told that patient to not perform OOB activity without helmet. PT will hold until dressing is removed to allow for appropriate helmet fitting or if further orders are placed for OOB activity without helmet. Once cleared for activity, PT will continue current POC. Anticipate d/c home with assist.  -SB     Row Name 04/05/23 0900          Therapy Assessment/Plan (PT)    Patient/Family Therapy Goals Statement (PT) go home  -SB     Rehab Potential (PT) good, to achieve stated therapy goals  -SB     Criteria  for Skilled Interventions Met (PT) yes;meets criteria;skilled treatment is necessary  -SB     Therapy Frequency (PT) 2 times/day  -SB     Predicted Duration of Therapy Intervention (PT) until d/c or goals met  -SB     Row Name 04/05/23 0900          Vital Signs    O2 Delivery Pre Treatment room air  -SB     Intra SpO2 (%) 95  -SB     O2 Delivery Intra Treatment room air  -SB     O2 Delivery Post Treatment room air  -SB     Row Name 04/05/23 0900          Positioning and Restraints    Pre-Treatment Position in bed  -SB     Post Treatment Position bed  -SB     In Bed notified nsg;fowlers;call light within reach;encouraged to call for assist;exit alarm on;side rails up x3;patient within staff view  -SB           User Key  (r) = Recorded By, (t) = Taken By, (c) = Cosigned By    Initials Name Provider Type    Erika Lynn, PT DPT Physical Therapist               Outcome Measures     Row Name 04/05/23 0900 04/05/23 0800       How much help from another person do you currently need...    Turning from your back to your side while in flat bed without using bedrails? 4  -SB 4  -CS    Moving from lying on back to sitting on the side of a flat bed without bedrails? 4  -SB 4  -CS    Moving to and from a bed to a chair (including a wheelchair)? 3  -SB 3  -CS    Standing up from a chair using your arms (e.g., wheelchair, bedside chair)? 3  -SB 3  -CS    Climbing 3-5 steps with a railing? 3  -SB 3  -CS    To walk in hospital room? 3  -SB 3  -CS    AM-PAC 6 Clicks Score (PT) 20  -SB 20  -CS    Highest level of mobility 6 --> Walked 10 steps or more  -SB 6 --> Walked 10 steps or more  -CS    Row Name 04/05/23 0900 04/05/23 0840       Functional Assessment    Outcome Measure Options AM-PAC 6 Clicks Basic Mobility (PT)  -SB AM-PAC 6 Clicks Daily Activity (OT)  -MENDEL          User Key  (r) = Recorded By, (t) = Taken By, (c) = Cosigned By    Initials Name Provider Type    Erika Lynn, BELEN DPT Physical Therapist    MENDEL Andres  Coleen OTR/L, CSRS Occupational Therapist    Coretta Hankins, RN Registered Nurse                             Physical Therapy Education     Title: PT OT SLP Therapies (In Progress)     Topic: Physical Therapy (In Progress)     Point: Mobility training (Done)     Learning Progress Summary           Patient Acceptance, E, VU by SB at 4/5/2023 1605    Comment: helmet fit, safety, POC    Acceptance, E, VU by CHRISTIANO at 4/3/2023 1353    Comment: gait, safety,    Acceptance, E, VU by SB at 4/1/2023 1405    Comment: pt edu on POC, benefits of act, PLB and d/c plans                   Point: Home exercise program (Not Started)     Learner Progress:  Not documented in this visit.          Point: Body mechanics (Not Started)     Learner Progress:  Not documented in this visit.          Point: Precautions (Done)     Learning Progress Summary           Patient Acceptance, E, VU by SB at 4/5/2023 1605    Comment: helmet fit, safety, POC    Acceptance, E, VU by SB at 4/1/2023 1405    Comment: pt edu on POC, benefits of act, PLB and d/c plans                               User Key     Initials Effective Dates Name Provider Type Discipline    CHRISTIANO 02/03/23 -  Александр Saravia, PTA Physical Therapist Assistant PT    SB 06/16/21 -  Erika Javier PT DPT Physical Therapist PT              PT Recommendation and Plan  Planned Therapy Interventions (PT): bed mobility training, balance training, gait training, patient/family education, transfer training, strengthening, orthotic fitting/training  Plan of Care Reviewed With: patient  Progress: no change  Outcome Evaluation: PT re-eval completed s/p cranioplasty on 4/4. Pt alert and oriented x4, c/o mild headache and sitting EOB with OT upon arrival. Pt with no overt strength or sensation deficits in BLE. Pt performs all bed mob with CGA and HOB elev. Attempted to leatha cranial helmet over dressing/drain, but unable to do so due to bulk of dressing. Discussed with ADITYA Gallego and told  that patient to not perform OOB activity without helmet. PT will hold until dressing is removed to allow for appropriate helmet fitting or if further orders are placed for OOB activity without helmet. Once cleared for activity, PT will continue current POC. Anticipate d/c home with assist.     Time Calculation:    PT Charges     Row Name 04/05/23 1606             Time Calculation    Start Time 0900  -SB      Stop Time 0930  -SB      Time Calculation (min) 30 min  -SB      PT Received On 04/05/23  -SB      PT Goal Re-Cert Due Date 04/15/23  -SB            User Key  (r) = Recorded By, (t) = Taken By, (c) = Cosigned By    Initials Name Provider Type    Erika Lynn, PT DPT Physical Therapist              Therapy Charges for Today     Code Description Service Date Service Provider Modifiers Qty    06330065530 HC PT RE-EVAL ESTABLISHED PLAN 2 4/5/2023 Erika Javier PT DPT GP 1          PT G-Codes  Outcome Measure Options: AM-PAC 6 Clicks Basic Mobility (PT)  AM-PAC 6 Clicks Score (PT): 20  AM-PAC 6 Clicks Score (OT): 18  PT Discharge Summary  Anticipated Discharge Disposition (PT): home with assist    Erika Javier PT DPT  4/5/2023

## 2023-04-05 NOTE — THERAPY RE-EVALUATION
Patient Name: Elijah Escobar  : 1955    MRN: [id number]                              Today's Date: 2023       Admit Date: 3/31/2023    Visit Dx:     ICD-10-CM ICD-9-CM   1. S/P craniotomy  Z98.890 V45.89   2. Impaired mobility  Z74.09 799.89   3. Infection of deep incisional surgical site after procedure, initial encounter  T81.42XA 998.59     Patient Active Problem List   Diagnosis   • Meningioma s/p craniotomy   • Hypertension   • GERD (gastroesophageal reflux disease)   • Coronary artery disease   • Class 2 severe obesity due to excess calories with serious comorbidity and body mass index (BMI) of 38.0 to 38.9 in adult   • Type 2 diabetes mellitus with hyperglycemia and peripheral neuropathy, with long-term current use of insulin (HCC)   • Leukocytosis   • Other specified anemias   • Paroxysmal atrial fibrillation with rapid ventricular response   • Post-operative infection   • Smoker   • History of cranioplasty   • Facial edema   • Swelling of scalp   • Acute pulmonary edema due to A-fib RVR   • Acute respiratory failure with hypoxia   • Type 2 myocardial infarction due to arrhythmia   • COPD (chronic obstructive pulmonary disease)   • Obesity (BMI 30-39.9)   • Tobacco abuse, in remission   • Acute systolic heart failure   • ADDIE (obstructive sleep apnea)     Past Medical History:   Diagnosis Date   • Brain tumor    • Coronary artery disease    • COVID-19 vaccine series completed     MODERNA X 3; LAST DOSE 3/2022   • Diabetes    • Erectile dysfunction    • GERD (gastroesophageal reflux disease)    • Hypercholesteremia    • Hypertension    • Seizure      Past Surgical History:   Procedure Laterality Date   • BALLOON ANGIOPLASTY, ARTERY Right    • COLONOSCOPY     • CRANIECTOMY Right 2022    Procedure: CRANIECTOMY WITH INCISIONAL WASHOUT;  Surgeon: Felipe Banerjee MD;  Location: [facility];  Service: Neurosurgery;  Laterality: Right;   • CRANIOPLASTY Right 10/4/2022    Procedure: CRANIOPLASTY  right with Lumbar drain;  Surgeon: Flaco Portillo MD;  Location:  [facility];  Service: Neurosurgery;  Laterality: Right;   • CRANIOPLASTY N/A 4/4/2023    Procedure: CRANIOPLASTY, removal, right, horseshoe;  Surgeon: Flaco Portillo MD;  Location:  [facility];  Service: Neurosurgery;  Laterality: N/A;   • CRANIOTOMY FOR TUMOR Right 6/16/2022    Procedure: CRANIOTOMY FOR TUMOR STERIOTACTIC WITH BRAIN LAB, right;  Surgeon: Flaco Portillo MD;  Location:  [facility];  Service: Neurosurgery;  Laterality: Right;      General Information     Row Name 04/05/23 0840          OT Time and Intention    Document Type re-evaluation  s/p cranioplasty, removal of R. Dx: infecion of deep incisional surgical site after procedure.  -     Mode of Treatment occupational therapy  -     Row Name 04/05/23 0840          General Information    Patient Profile Reviewed yes  -     Existing Precautions/Restrictions fall;oxygen therapy device and L/min   Helmet to be worn at all times  -     Barriers to Rehab medically complex  -J     Row Name 04/05/23 0840          Cognition    Orientation Status (Cognition) oriented x 4  -J     Row Name 04/05/23 0840          Safety Issues, Functional Mobility    Impairments Affecting Function (Mobility) shortness of breath;endurance/activity tolerance  -           User Key  (r) = Recorded By, (t) = Taken By, (c) = Cosigned By    Initials Name Provider Type     Coleen Andres, OTR/L, CSRS Occupational Therapist                 Mobility/ADL's     Row Name 04/05/23 0840          Bed Mobility    Bed Mobility supine-sit;sit-supine  -JJ     Supine-Sit [place] (Bed Mobility) contact guard  -JJ     Sit-Supine [place] (Bed Mobility) contact guard  -J     Assistive Device (Bed Mobility) head of bed elevated  -     Comment, (Bed Mobility) scooting towards HOB while seated at EOB. Further mobility held at this time while awaiting on clarification of helmet.  -     Row  Name 04/05/23 0840          Transfers    Comment, (Transfers) Further mobility held at this time while awaiting on clarification of helmet  -J     Row Name 04/05/23 0840          Functional Mobility    Functional Mobility- Comment Further mobility held at this time while awaiting on clarification of helmet  -J     Row Name 04/05/23 0840          Activities of Daily Living    BADL Assessment/Intervention lower body dressing  -     Row Name 04/05/23 0840          Lower Body Dressing Assessment/Training    [place] Level (Lower Body Dressing) don;socks;minimum assist (75% patient effort)  -     Position (Lower Body Dressing) supported sitting  -           User Key  (r) = Recorded By, (t) = Taken By, (c) = Cosigned By    Initials Name Provider Type    Coleen Martinez OTR/L, CSRS Occupational Therapist               Obj/Interventions     Row Name 04/05/23 0840          Sensory Assessment (Somatosensory)    Sensory Assessment (Somatosensory) UE sensation intact  -     Row Name 04/05/23 0840          Vision Assessment/Intervention    Visual Impairment/Limitations peripheral vision impaired right  baseline from tumor that was removed in June 2022  -     Row Name 04/05/23 0840          Range of Motion Comprehensive    General Range of Motion bilateral upper extremity ROM WFL  -     Row Name 04/05/23 0840          Strength Comprehensive (MMT)    General Manual Muscle Testing (MMT) Assessment no strength deficits identified  -     Row Name 04/05/23 0840          Motor Skills    Coordination WNL;bilateral;upper extremity;lower extremity;finger to nose;heel to martin  -     Row Name 04/05/23 0840          Balance    Balance Assessment sitting static balance;sitting dynamic balance  -JJ     Static Sitting Balance standby assist  -JJ     Dynamic Sitting Balance standby assist;contact guard  -JJ     Position, Sitting Balance unsupported;sitting edge of bed  -           User Key  (r) = Recorded By, (t) =  Taken By, (c) = Cosigned By    Initials Name Provider Type    Coleen Martinez OTR/L, CSRS Occupational Therapist               Goals/Plan     Row Name 04/05/23 0840          Transfer Goal 1 (OT)    Activity/Assistive Device (Transfer Goal 1, OT) toilet;bed-to-chair/chair-to-bed  -JJ     [place] Level/Cues Needed (Transfer Goal 1, OT) modified independence  -JJ     Time Frame (Transfer Goal 1, OT) long term goal (LTG)  -JJ     Progress/Outcome (Transfer Goal 1, OT) new goal  -JJ     Row Name 04/05/23 0840          Bathing Goal 1 (OT)    Activity/Device (Bathing Goal 1, OT) bathing skills, all  -JJ     [place] Level/Cues Needed (Bathing Goal 1, OT) supervision required  -JJ     Time Frame (Bathing Goal 1, OT) long term goal (LTG)  -JJ     Strategies/Barriers (Bathing Goal 1, OT) demonstrating energy conservation techniques with no vcs  -JJ     Progress/Outcomes (Bathing Goal 1, OT) new goal  -JJ     Row Name 04/05/23 0840          Dressing Goal 1 (OT)    Activity/Device (Dressing Goal 1, OT) lower body dressing  -JJ     [place]/Cues Needed (Dressing Goal 1, OT) supervision required;independent  -JJ     Time Frame (Dressing Goal 1, OT) long term goal (LTG)  -JJ     Progress/Outcome (Dressing Goal 1, OT) goal met;goal revised this date  -JJ     Row Name 04/05/23 0840          Therapy Assessment/Plan (OT)    Planned Therapy Interventions (OT) activity tolerance training;BADL retraining;functional balance retraining;transfer/mobility retraining;occupation/activity based interventions;patient/caregiver education/training;cognitive/visual perception retraining  -JJ           User Key  (r) = Recorded By, (t) = Taken By, (c) = Cosigned By    Initials Name Provider Type    Coleen Martinez OTR/L, CSRS Occupational Therapist               Clinical Impression     Row Name 04/05/23 0840          Pain Assessment    Pretreatment Pain Rating 2/10  -JJ     Posttreatment Pain Rating 2/10  -JJ     Pain  Location - head  -JJ     Pain Intervention(s) Medication (See MAR);Repositioned  -     Row Name 04/05/23 0840          Plan of Care Review    Plan of Care Reviewed With patient  -     Progress no change  -     Outcome Evaluation OT re-eval completed s/p R cranioplasty on 4/4. He presents alert and oriented x4, resting comfortably in bed on RA. He was able to bring self to sitting at EOB with CGA. Donned socks with set-up and CGA. B UE strength 5/5, coordination, sensation and vision all intact. He was able to scoot self towards HOB while seated at EOB with SBA/CGA. Attempted to don helmet over head dressing/drain, but unable to don cranial helmet d/t bulk of dressing. Discussed with Rahul Arnold, pt is not to be up out of bed without helmet. Therefore, OT will HOLD until dressing is removed for proper fitting of cranial helmet or until further orders are provided on OOB activity without helmet. Once cleared by neuro sx, will resume OT POC and all goals adjusted to adaquately reflect pt progress. Anticipate d/c home with assist.  -     Row Name 04/05/23 0840          Therapy Assessment/Plan (OT)    Rehab Potential (OT) good, to achieve stated therapy goals  -     Criteria for Skilled Therapeutic Interventions Met (OT) yes;skilled treatment is necessary  -     Therapy Frequency (OT) 3 times/wk  -     Row Name 04/05/23 0840          Therapy Plan Review/Discharge Plan (OT)    Anticipated Discharge Disposition (OT) home with assist  -     Row Name 04/05/23 0840          Positioning and Restraints    Pre-Treatment Position in bed  -     Post Treatment Position bed  -JJ     In Bed notified nsg;fowlers;call light within reach;encouraged to call for assist;exit alarm on;side rails up x3;patient within staff view  -           User Key  (r) = Recorded By, (t) = Taken By, (c) = Cosigned By    Initials Name Provider Type    Coleen Martinez, OTR/L, CSRS Occupational Therapist               Outcome Measures      Row Name 04/05/23 0840          How much help from another is currently needed...    Putting on and taking off regular lower body clothing? 2  -JJ     Bathing (including washing, rinsing, and drying) 2  -JJ     Toileting (which includes using toilet bed pan or urinal) 3  -JJ     Putting on and taking off regular upper body clothing 3  -JJ     Taking care of personal grooming (such as brushing teeth) 4  -JJ     Eating meals 4  -JJ     AM-PAC 6 Clicks Score (OT) 18  -JJ     Row Name 04/05/23 0800          How much help from another person do you currently need...    Turning from your back to your side while in flat bed without using bedrails? 4  -CS     Moving from lying on back to sitting on the side of a flat bed without bedrails? 4  -CS     Moving to and from a bed to a chair (including a wheelchair)? 3  -CS     Standing up from a chair using your arms (e.g., wheelchair, bedside chair)? 3  -CS     Climbing 3-5 steps with a railing? 3  -CS     To walk in hospital room? 3  -CS     AM-PAC 6 Clicks Score (PT) 20  -CS     Highest level of mobility 6 --> Walked 10 steps or more  -CS     Row Name 04/05/23 0840          Functional Assessment    Outcome Measure Options AM-PAC 6 Clicks Daily Activity (OT)  -           User Key  (r) = Recorded By, (t) = Taken By, (c) = Cosigned By    Initials Name Provider Type    Coleen Martinez OTR/L, CSRS Occupational Therapist    Coretta Hankins, RN Registered Nurse                Occupational Therapy Education     Title: PT OT SLP Therapies (In Progress)     Topic: Occupational Therapy (Done)     Point: ADL training (Done)     Description:   Instruct learner(s) on proper safety adaptation and remediation techniques during self care or transfers.   Instruct in proper use of assistive devices.              Learning Progress Summary           Patient Acceptance, E, VU by MENDEL at 4/5/2023 1245    Acceptance, E, VU by MENDEL at 4/3/2023 0811    Acceptance, E, VU,NR by GABRIELA at 4/1/2023  1445                   Point: Home exercise program (Done)     Description:   Instruct learner(s) on appropriate technique for monitoring, assisting and/or progressing therapeutic exercises/activities.              Learning Progress Summary           Patient Acceptance, E, VU,NR by  at 4/1/2023 1445                   Point: Precautions (Done)     Description:   Instruct learner(s) on prescribed precautions during self-care and functional transfers.              Learning Progress Summary           Patient Acceptance, E, VU by TITI at 4/5/2023 1245    Acceptance, E, VU by TITI at 4/3/2023 0811    Acceptance, E, VU,NR by CS at 4/1/2023 1445                   Point: Body mechanics (Done)     Description:   Instruct learner(s) on proper positioning and spine alignment during self-care, functional mobility activities and/or exercises.              Learning Progress Summary           Patient Acceptance, E, VU by TITI at 4/5/2023 1245    Acceptance, E, VU by TITI at 4/3/2023 0811    Acceptance, E, VU,NR by GABRIELA at 4/1/2023 1445                               User Key     Initials Effective Dates Name Provider Type Discipline     02/03/23 -  Coretta Siddiqi, OTR/L, CNT Occupational Therapist OT    TITI 11/10/21 -  Coleen Andres OTR/L, CSRS Occupational Therapist OT              OT Recommendation and Plan  Planned Therapy Interventions (OT): activity tolerance training, BADL retraining, functional balance retraining, transfer/mobility retraining, occupation/activity based interventions, patient/caregiver education/training, cognitive/visual perception retraining  Therapy Frequency (OT): 3 times/wk  Plan of Care Review  Plan of Care Reviewed With: patient  Progress: no change  Outcome Evaluation: OT re-eval completed s/p R cranioplasty on 4/4. He presents alert and oriented x4, resting comfortably in bed on RA. He was able to bring self to sitting at EOB with CGA. Donned socks with set-up and CGA. B UE strength 5/5,  coordination, sensation and vision all intact. He was able to scoot self towards HOB while seated at EOB with SBA/CGA. Attempted to don helmet over head dressing/drain, but unable to don cranial helmet d/t bulk of dressing. Discussed with Rahul Arnold, pt is not to be up out of bed without helmet. Therefore, OT will HOLD until dressing is removed for proper fitting of cranial helmet or until further orders are provided on OOB activity without helmet. Once cleared by neuro sx, will resume OT POC and all goals adjusted to adaquately reflect pt progress. Anticipate d/c home with assist.     Time Calculation:    Time Calculation- OT     Row Name 04/05/23 0840             Time Calculation- OT    OT Start Time 0840  -      OT Stop Time 0929  -      OT Time Calculation (min) 49 min  -      Total Timed Code Minutes- OT 49 minute(s)  -      OT Received On 04/05/23  -      OT Goal Re-Cert Due Date 04/15/23  -            User Key  (r) = Recorded By, (t) = Taken By, (c) = Cosigned By    Initials Name Provider Type    Coleen Martinez OTR/L, CSRS Occupational Therapist              Therapy Charges for Today     Code Description Service Date Service Provider Modifiers Qty    94316140749 HC OT RE-EVAL 2 4/5/2023 Coleen Andres OTR/L, CSRS GO 1    [id number]  OT SELF CARE/MGMT/TRAIN EA 15 MIN 4/5/2023 Coleen Andres OTR/L, CSRS GO 1               PETER [place]/L, CSRS  4/5/2023

## 2023-04-05 NOTE — CONSULTS
INFECTIOUS DISEASES CONSULT NOTE    Patient:  Elijah Escobar 67 y.o. male  ROOM # I003/1  YOB: 1955  MRN: [id number]  [facility]:  [id number]  Admit date: 3/31/2023   Admitting Physician: Flaco Portillo, *  Primary Care Physician: Brianna Martinez APRN  REFERRING PROVIDER: Flaco Portillo,*    Reason for Consultation: Extracranial infection. Recurrence.    History of Present Illness/Chief Complaint: 67-year-old man.  History of meningioma resection in July 2022.  He had had difficulties with infection with Serratia.  He received extended course of antibiotic treatment.  He had undergone debridement and subsequent cranioplasty.  He apparently had been doing well until a few days prior to admission.  He developed some right frontal swelling and fluid accumulation.  He does not describe fever, headache, or other neurological symptoms.  He had fluid aspirated which again grew Serratia.  He underwent operative debridement of his previous craniotomy with removal of all foreign material.  Per review of the operative note purulent material was identified both above and below the cranioplasty.  Serratia was again recovered from preoperative aspirate.  He has been on treatment with ceftriaxone.  Infectious disease asked to evaluate and offer recommendations.    Current Scheduled Medications:   aspirin, 81 mg, Oral, Daily  atorvastatin, 20 mg, Oral, Nightly  budesonide-formoterol, 2 puff, Inhalation, BID - RT  cefTRIAXone, 1 g, Intravenous, Q24H  digoxin, 125 mcg, Oral, Daily  dilTIAZem CD, 240 mg, Oral, Q24H  enoxaparin, 1 mg/kg, Subcutaneous, Q12H  furosemide, 20 mg, Oral, Daily  gabapentin, 300 mg, Oral, TID  ipratropium, 0.5 mg, Nebulization, 4x Daily - RT  levETIRAcetam, 500 mg, Oral, BID  methylPREDNISolone sodium succinate, 20 mg, Intravenous, Q12H  metoprolol tartrate, 50 mg, Oral, Q12H  mupirocin, 1 application, Each Nare, BID  pantoprazole, 40 mg, Oral, Q AM  polyethylene glycol, 17 g,  Oral, Daily  sennosides-docusate, 1 tablet, Oral, Daily  sodium chloride, 10 mL, Intravenous, Q12H  sodium chloride, 10 mL, Intravenous, Q12H    Current PRN Medications:  •  acetaminophen **OR** acetaminophen **OR** acetaminophen  •  butalbital-acetaminophen-caffeine  •  dextrose  •  dextrose  •  dextrose 5 % and sodium chloride 0.45 %  •  dextrose 5 % and sodium chloride 0.45 % with KCl 20 mEq/L  •  dextrose 5 % and sodium chloride 0.45 % with KCl 40 mEq/L  •  dextrose 5 % and sodium chloride 0.9 %  •  dextrose 5 % and sodium chloride 0.9 % with KCl 20 mEq  •  dextrose 5% and sodium chloride 0.9% with KCl 40 mEq/L  •  glucagon (human recombinant)  •  nicotine  •  ondansetron **OR** ondansetron  •  oxyCODONE-acetaminophen  •  oxyCODONE-acetaminophen  •  Pharmacy to Dose enoxaparin (LOVENOX)  •  custom IV KCl infusion builder  •  sodium chloride  •  sodium chloride 0.45 % with KCl 20 mEq  •  sodium chloride  •  sodium chloride  •  sodium chloride  •  sodium chloride  •  sodium chloride  •  sodium chloride 0.9 % with KCl 20 mEq  •  sodium chloride 0.9 % with KCl 40 mEq/L    Allergies:  No Known Allergies     Past Medical History: History of craniotomy for grade 1 meningioma.  Orthostatic dizziness.  Postoperative craniotomy infection - Serratia Headache.  Gastroesophageal reflux disease.  Hypercholesterolemia.  Hypertension.  Diabetes mellitus.  Erectile dysfunction.  Coronary artery disease.     Past Surgical History: Previous craniotomy on June 16, 2022 for right-sided meningioma.  Previous angioplasty.     Social History: Smokes 1/2 pack/day of cigarettes.  No alcohol or illicit drug use.  .  Lives in [facility].  Retired.  Previously worked for the [employer].     Family History: Mother history of cancer.  Father history of heart disease.    Review of Systems he has had no cough, shortness of breath, or dyspnea.  Minimal nausea.  No vomiting, diarrhea, or rash.    Vital Signs:  /71    "Pulse 87   Temp 98.2 °F (36.8 °C)   Resp 16   Ht 177.8 cm (70\")   Wt 119 kg (263 lb 4.8 oz)   SpO2 (!) 80%   BMI 37.78 kg/m²  Temp (24hrs), Av.9 °F (36.6 °C), Min:97.4 °F (36.3 °C), Max:98.2 °F (36.8 °C)    Physical Exam  Vital signs - reviewed.  Line/IV site - No erythema or tenderness.  Pleasant.  No distress.  Right frontal craniotomy dressing clean and dry.  Drain in place with small amount of serous slightly cloudy drainage  Lungs without crackles  Heart without murmur  Abdomen is soft and nontender  Moving all extremities  Does not appear to have any focal motor deficit  Intact sensation upper and lower extremities  Skin without drug rash    Lab Results:  CBC: Results from last 7 days   Lab Units 23  0805 23  0436 23  0613 23  0435 23  1020   WBC 10*3/mm3 14.40* 11.00* 17.92* 19.05* 15.32*   HEMOGLOBIN g/dL 11.6* 11.0* 11.9* 13.3 13.5   HEMATOCRIT % 34.8* 34.5* 36.6* 42.2 40.8   PLATELETS 10*3/mm3 234 188 192 224 214     CMP:   Results from last 7 days   Lab Units 23  1207 23  0304 23  0745 23  0436 23  0613 23  0559 23  0435 23  1020   SODIUM mmol/L 132* 134* 136 139 135* 136 137 138   POTASSIUM mmol/L 3.5 3.7 3.3* 3.6 4.4 4.5 3.9 4.0   CHLORIDE mmol/L 96* 97* 98 102 99 97* 99 100   CO2 mmol/L 24.0 20.0* 21.0* 23.0 15.0* 19.0* 16.0* 23.0   BUN mg/dL 47* 46* 42* 36* 39* 35* 34* 22   CREATININE mg/dL 1.46* 1.42* 1.36* 1.16 1.17 1.26 1.20 1.18   CALCIUM mg/dL 7.9* 8.0* 8.3* 8.6 8.3* 9.0 8.7 8.9   BILIRUBIN mg/dL  --   --  0.5 0.7  --   --  1.2 1.0   ALK PHOS U/L  --   --  85 89  --   --  111 103   ALT (SGPT) U/L  --   --  10 11  --   --  11 11   AST (SGOT) U/L  --   --  13 22  --   --  28 30   GLUCOSE mg/dL 436* 408* 343* 102* 331* 281* 261* 243*     Forehead aspirate 2023:  Susceptibility   Serratia marcescens     STALIN     Cefepime <=1 ug/ml Susceptible     Ceftazidime <=1 ug/ml Susceptible     Ceftriaxone <=1 ug/ml " Susceptible     Gentamicin <=1 ug/ml Susceptible     Levofloxacin <=0.12 ug/ml Susceptible     Piperacillin + Tazobactam  Susceptible 1     Tetracycline >=16 ug/ml Resistant     Trimethoprim + Sulfamethoxazole <=20 ug/ml Susceptible      Radiology:     MRI brain without contrast 3/31/2023:  IMPRESSION:  1. Limited exam. Patient is able to tolerate only axial diffusion  weighted, FLAIR, T1 sequences with a single sagittal T1 sequence. No  additional imaging or contrast administered due to patient unable to  tolerate study.  2. Right frontal cranioplasty changes. Increasing fluid collection  superficial to the cranioplasty flap within the scalp concerning for  pseudomeningocele. Questionable small subdural hemorrhage deep to the  cranioplasty site. Mild hyperintense FLAIR signal noted within the  underlying right frontal lobe parenchyma.  This report was finalized on 03/31/2023 17:20 by Dr. Coleen Terry MD.    CT head without contrast 3/31/2023:  Summary:  1. Postsurgical changes with right frontal scalp fluid collection and a  small amount of right frontal ventricular cranial hemorrhage. No midline  shift or large intracranial hematoma.  This report was finalized on 03/31/2023 15:44 by Dr. Ghulam Cano MD.    Additional Studies Reviewed:      2D Echocardiogram 4/1/2023:  •  Left ventricular systolic function is mildly decreased. Left ventricular ejection fraction appears to be 46 - 50%.  •  Left ventricular wall thickness is consistent with mild to moderate concentric hypertrophy.  •  The following left ventricular wall segments are hypokinetic: apical anterior, apical lateral, apical inferior, apical septal, apex hypokinetic and mid anteroseptal.  •  Left atrial volume is mildly increased.  •  Estimated right ventricular systolic pressure from tricuspid regurgitation is mildly elevated (35-45 mmHg).  •  Normal size and function the right ventricle.  •  No significant (greater than mild) valvular pathology.  •   Mild dilation of the aortic root is present.  •  Compared to prior exam from 6/19/2022, both studies were technically difficult, but upon my independent review of the previous exam, there did appear to be some mild apical hypokinesis present on that study that does look slightly more prominent on current exam.    Impression:   Infection right frontal cranial flap/cranioplasty-Serratia recovered from culture.  Some Serratia process a chromosomally mediated beta-lactamase which can be expressed at times under treatment with cephalosporin leading to the development of resistance while on treatment.    Recommendations:    Would recommend treatment with meropenem initially  Asking lab to report meropenem/ertapenem susceptibility  Anticipate 2 to 3-week course of intravenous antibiotic therapy followed by oral treatment  I do not think he could manage IV antibiotic treatment at home  May need temporary LTAC or subacute rehab placement for antimicrobial therapy, wound care, and physical therapy  Continue to follow    Edwin Jeffers MD  04/05/23  14:03 CDT

## 2023-04-05 NOTE — PLAN OF CARE
Goal Outcome Evaluation:              Outcome Evaluation: Pt AOx4, follows commands. A Fib 110's throughout the night, BP stable. On 4L, sat is 96%. Total of 25mL out from JUAN drain for the shift. Stockinette dressing remains clean, dry and intact. Percocet x1 and fioricet x1. Voiding. Safety maintained. Pt was very pleasant throughout the shift. LR infusing @25. Will continue to monitor. Critical blood sugar of 408 with morning labs, Dr. Steve notified, see orders. Repeat blood sugar was 430. MD notified, see orders.

## 2023-04-05 NOTE — PROGRESS NOTES
RT EQUIPMENT DEVICE RELATED - SKIN ASSESSMENT    RT Medical Equipment/Device:     NIV Mask:  Full-face    size: L    Skin Assessment:      Nose:  Intact    Device Skin Pressure Protection:  Skin-to-device areas padded:  Hydrocolloid nasal strips on bridge of nose    Nurse Notification:  Kylie Hayden, RRT

## 2023-04-05 NOTE — PROGRESS NOTES
[facility] Medicine Services  INPATIENT PROGRESS NOTE    Patient Name: Elijah Escobar  Date of Admission: 3/31/2023  Today's Date: 04/05/23  Length of Stay: 5  Primary Care Physician: Brianna Martinez APRN    Subjective   Chief Complaint: Consulted for medical management.   HPI   He went for surgical intervention with Dr. Portillo yesterday.  Purulence was encountered.  Cranioplasty was done.    Blood sugars are again unacceptable.  They have escalated to the 300s and 400s.  Escalate basal dosing today.  He was also given 70/30, 20 units earlier this morning.    Remains in atrial fibrillation, but improved rate control today.  He was in RVR yesterday prior to surgery and getting his medications.  Rates more around 100 this morning.    On ceftriaxone monotherapy.     Review of Systems   All pertinent negatives and positives are as above. All other systems have been reviewed and are negative unless otherwise stated.     Objective    Temp:  [97.3 °F (36.3 °C)-98.2 °F (36.8 °C)] 97.9 °F (36.6 °C)  Heart Rate:  [] 107  Resp:  [12-28] 16  BP: ()/() 114/76  Physical Exam  Constitutional:       Appearance: He is obese. He is ill-appearing (chronically).      Comments: Up in bed.  No distress.  No family present. Seen and discussed with his nurse, Coretta.    HENT:      Head: Normocephalic.      Comments: Bandaging around his head.   Eyes:      Conjunctiva/sclera: Conjunctivae normal.      Pupils: Pupils are equal, round, and reactive to light.   Neck:      Vascular: No JVD.   Cardiovascular:      Rate and Rhythm: Tachycardia present. Rhythm irregular.      Heart sounds: Normal heart sounds.      Comments: In atrial fibrillation with rates in the 100s.   Pulmonary:      Effort: Pulmonary effort is normal. No respiratory distress.      Breath sounds: Rales present. No wheezing.      Comments: On 4L.   Abdominal:      General: Bowel sounds are normal. There is no  distension.      Palpations: Abdomen is soft.      Tenderness: There is no abdominal tenderness.   Musculoskeletal:         General: Swelling (trace) present. No tenderness or deformity. Normal range of motion.   Skin:     General: Skin is warm and dry.      Findings: No rash.   Neurological:      Mental Status: He is alert and oriented to person, place, and time.      Cranial Nerves: No cranial nerve deficit.      Motor: No abnormal muscle tone.      Deep Tendon Reflexes: Reflexes normal.   Psychiatric:         Mood and Affect: Mood normal.         Behavior: Behavior normal.         Thought Content: Thought content normal.         Judgment: Judgment normal.         Intake/Output Summary (Last 24 hours) at 4/5/2023 0803  Last data filed at 4/5/2023 0327  Gross per 24 hour   Intake 837.9 ml   Output 915 ml   Net -77.1 ml     Results Review:  I have reviewed the labs, radiology results, and diagnostic studies.    Laboratory Data:   Results from last 7 days   Lab Units 04/04/23  0805 04/03/23  0436 04/02/23  0613   WBC 10*3/mm3 14.40* 11.00* 17.92*   HEMOGLOBIN g/dL 11.6* 11.0* 11.9*   HEMATOCRIT % 34.8* 34.5* 36.6*   PLATELETS 10*3/mm3 234 188 192     Results from last 7 days   Lab Units 04/05/23  0304 04/04/23  0745 04/03/23  0436 04/01/23  0559 04/01/23  0435   SODIUM mmol/L 134* 136 139   < > 137   POTASSIUM mmol/L 3.7 3.3* 3.6   < > 3.9   CHLORIDE mmol/L 97* 98 102   < > 99   CO2 mmol/L 20.0* 21.0* 23.0   < > 16.0*   BUN mg/dL 46* 42* 36*   < > 34*   CREATININE mg/dL 1.42* 1.36* 1.16   < > 1.20   CALCIUM mg/dL 8.0* 8.3* 8.6   < > 8.7   BILIRUBIN mg/dL  --  0.5 0.7  --  1.2   ALK PHOS U/L  --  85 89  --  111   ALT (SGPT) U/L  --  10 11  --  11   AST (SGOT) U/L  --  13 22  --  28   GLUCOSE mg/dL 408* 343* 102*   < > 261*    < > = values in this interval not displayed.     Culture Data:   Blood Culture   Date Value Ref Range Status   03/31/2023 No growth at 3 days  Preliminary   03/31/2023 No growth at 3 days   Preliminary     Urine Culture   Date Value Ref Range Status   03/31/2023 No growth  Final     Wound Culture   Date Value Ref Range Status   04/01/2023 Heavy growth (4+) Serratia marcescens (A)  Preliminary     Respiratory Culture   Date Value Ref Range Status   04/03/2023 Rejected  Preliminary     I have reviewed the patient's current medications.     Assessment/Plan   Assessment  Active Hospital Problems    Diagnosis    • **Swelling of scalp    • Post-operative infection    • Meningioma s/p craniotomy    • Type 2 diabetes mellitus with hyperglycemia and peripheral neuropathy, with long-term current use of insulin (HCC)    • Acute systolic heart failure    • Type 2 myocardial infarction due to arrhythmia    • Acute pulmonary edema due to A-fib RVR    • Paroxysmal atrial fibrillation with rapid ventricular response    • Coronary artery disease    • COPD (chronic obstructive pulmonary disease)    • Obesity (BMI 30-39.9)    • Tobacco abuse, in remission    • ADDIE (obstructive sleep apnea)    • Acute respiratory failure with hypoxia      Treatment Plan  The patient was admitted on 3/31 by Dr. Portillo with the neurosurgical service.  He has a history of craniotomy in June 2022 that became infected and required washout in July 2022.  He then required a craniectomy in October 2022.  He presented and had a right frontal scalp fluid collection.  Spinal fluid was clean.  Surgical intervention was planned, but the patient had other issues going on including uncontrolled hyperglycemia and atrial fibrillation with rapid ventricular response.  Hospital medicine was consulted to assist with the above.    The scalp was also aspirated and he ultimately grew Serratia.  This was resistant to tetracycline.  He has been receiving vancomycin and Zosyn.  I de-escalated his antibiotic coverage to ceftriaxone monotherapy on 4/4. He underwent I&D on 4/4 with Dr. Portillo.     He has been seen by cardiology.  He has had some difficulties  with paroxysmal atrial fibrillation with rapid ventricular response.  He is on therapeutic Lovenox and aspirin.  Efforts have been made at rate control with Cardizem CD and digoxin. Echocardiogram showed an EF of 46-50%.  He also had some hypokinesis of some segments.  Study was also technically difficult.  Currently on oral Lasix.  Not presently on ACE/ARB.  Not presently on Toprol-XL/carvedilol, but metoprolol tartrate.    He has been seen by pulmonology.  His pulmonary infiltrates are likely more secondary to CHF than pneumonia.  He has been on antibiotics to cover his scalp infection.  Pulmonology also has him on IV steroids, which have been exacerbating his blood sugars.  Continue efforts at pulmonary toilet.  Wean oxygen as tolerated. Back on 2-4 L after surgery.     He has a history of diabetes.  His hemoglobin A1c was 11.0 in October 2022 and is 11.0 now. I will update this.  He is on Levemir in the home setting.  He at one point was on an insulin drip.  He is now on long-acting insulin (increase) and sliding scale insulin.  Most recent glucoses have been 371, 408, 442.     DVT prophylaxis is achieved by being on therapeutic Lovenox.    Medical Decision Making  Number and Complexity of problems: The main issue requiring hospitalization was 1 acute, moderately complex problem in the form of his scalp infection where he has previously had craniotomy to remove any meningioma.  Differential Diagnosis: Bacterial versus fungal infection.    Conditions and Status        Condition is improving.     [facility] Data  External documents reviewed: Reviewed prior [facility] records.  Cardiac tracing (EKG, telemetry) interpretation: In atrial fibrillation on telemetry at present.  Radiology interpretation: No recent studies.  Reviewed admission studies.  Labs reviewed: As above.  Any tests that were considered but not ordered: None considered at present.     Decision rules/scores evaluated (example [id number], Wells, etc):  [id number] score is 4.     Discussed with: The patient and his nurse, Coretta.     Care Planning  Shared decision making: Consented to admission for further treatment.  Code status and discussions: Full with full interventions.    Disposition  Social Determinants of Health that impact treatment or disposition: Nothing negative identified at present.  I expect the patient to be discharged to per the neurosurgical service.      Electronically signed by Joey Moore DO, 04/05/23, 08:03 CDT.

## 2023-04-05 NOTE — PROGRESS NOTES
LOS: 5 days   Patient Care Team:  Brianna Martinez APRN as PCP - General (Nurse Practitioner)  Rahul Arnold APRN as Nurse Practitioner (Nurse Practitioner)  Flaco Portillo MD as Surgeon (Neurosurgery)    Chief Complaint: Cardiology consulted for rapid atrial fibrillation     Subjective    Elijah Escobar is a 67 y.o. male who is being seen in follow-up  Patient had surgery yesterday  Scalp is wrapped in bandage  No other new issues or events  Ventricular rates are under fair control  No chest pain  No palpitation  No presyncope  No syncope  No orthopnea  No paroxysmal nocturnal dyspnea  Elevated high-sensitivity troponin  Continues to have atrial fibrillation however ventricular rates are now better controlled  Labs as referenced below  Patient asking regarding being discharged home     Review of Systems   Constitutional: No chills   Has fatigue   No fever.   HENT: Negative.    Eyes: Negative.    Respiratory: Negative for cough,   No chest wall soreness,   Shortness of breath,   no wheezing, no stridor.    Cardiovascular: As above  Gastrointestinal: Negative for abdominal distention,  No abdominal pain,   No blood in stool,   No constipation,   No diarrhea,   No nausea   No vomiting.   Endocrine: Negative.    Genitourinary: Negative for difficulty urinating, dysuria, flank pain and hematuria.   Musculoskeletal: Negative.    Skin: Negative for rash and wound.   Allergic/Immunologic: Negative.    Neurological: Negative for dizziness, syncope, weakness,   No light-headedness  No  headaches.   Hematological: Does not bruise/bleed easily.   Psychiatric/Behavioral: Negative for agitation or behavioral problems,   No confusion,   the patient is  nervous/anxious.       History:   Past Medical History:   Diagnosis Date   • Brain tumor    • Coronary artery disease    • COVID-19 vaccine series completed     MODERNA X 3; LAST DOSE 3/2022   • Diabetes    • Erectile dysfunction    • GERD (gastroesophageal reflux  disease)    • Hypercholesteremia    • Hypertension    • Seizure      Past Surgical History:   Procedure Laterality Date   • BALLOON ANGIOPLASTY, ARTERY Right    • COLONOSCOPY     • CRANIECTOMY Right 2022    Procedure: CRANIECTOMY WITH INCISIONAL WASHOUT;  Surgeon: Felipe Banerjee MD;  Location:  [facility];  Service: Neurosurgery;  Laterality: Right;   • CRANIOPLASTY Right 10/4/2022    Procedure: CRANIOPLASTY right with Lumbar drain;  Surgeon: Flaco Portillo MD;  Location:  [facility];  Service: Neurosurgery;  Laterality: Right;   • CRANIOPLASTY N/A 2023    Procedure: CRANIOPLASTY, removal, right, horseshoe;  Surgeon: Flaco Portillo MD;  Location:  [facility];  Service: Neurosurgery;  Laterality: N/A;   • CRANIOTOMY FOR TUMOR Right 2022    Procedure: CRANIOTOMY FOR TUMOR STERIOTACTIC WITH BRAIN LAB, right;  Surgeon: Flaco Portillo MD;  Location:  [facility];  Service: Neurosurgery;  Laterality: Right;     Social History     Socioeconomic History   • Marital status:    Tobacco Use   • Smoking status: Former     Packs/day: 0.25     Years: 15.00     Pack years: 3.75     Types: Cigarettes     Quit date: 3/15/2023     Years since quittin.0   • Smokeless tobacco: Never   Vaping Use   • Vaping Use: Never used   Substance and Sexual Activity   • Alcohol use: Never   • Drug use: Never   • Sexual activity: Defer     Family History   Problem Relation Age of Onset   • Cancer Mother         liver   • Heart disease Father        Labs:  WBC WBC   Date Value Ref Range Status   2023 14.40 (H) 3.40 - 10.80 10*3/mm3 Final   2023 11.00 (H) 3.40 - 10.80 10*3/mm3 Final      HGB Hemoglobin   Date Value Ref Range Status   2023 11.6 (L) 13.0 - 17.7 g/dL Final   2023 11.0 (L) 13.0 - 17.7 g/dL Final      HCT Hematocrit   Date Value Ref Range Status   2023 34.8 (L) 37.5 - 51.0 % Final   2023 34.5 (L) 37.5 - 51.0 % Final      Platelets Platelets   Date Value Ref  Range Status   04/04/2023 234 140 - 450 10*3/mm3 Final   04/03/2023 188 140 - 450 10*3/mm3 Final      MCV MCV   Date Value Ref Range Status   04/04/2023 91.1 79.0 - 97.0 fL Final   04/03/2023 95.0 79.0 - 97.0 fL Final        Results from last 7 days   Lab Units 04/05/23  1207 04/05/23  0304 04/04/23  0745 04/03/23  0436 04/01/23  0559 04/01/23  0435   SODIUM mmol/L 132* 134* 136 139   < > 137   POTASSIUM mmol/L 3.5 3.7 3.3* 3.6   < > 3.9   CHLORIDE mmol/L 96* 97* 98 102   < > 99   CO2 mmol/L 24.0 20.0* 21.0* 23.0   < > 16.0*   BUN mg/dL 47* 46* 42* 36*   < > 34*   CREATININE mg/dL 1.46* 1.42* 1.36* 1.16   < > 1.20   CALCIUM mg/dL 7.9* 8.0* 8.3* 8.6   < > 8.7   BILIRUBIN mg/dL  --   --  0.5 0.7  --  1.2   ALK PHOS U/L  --   --  85 89  --  111   ALT (SGPT) U/L  --   --  10 11  --  11   AST (SGOT) U/L  --   --  13 22  --  28   GLUCOSE mg/dL 436* 408* 343* 102*   < > 261*    < > = values in this interval not displayed.     Lab Results   Component Value Date    TROPONINT 573 (C) 04/05/2023     PT/INR:    No results found for: PROTIME/  No results found for: INR    Imaging Results (Last 72 Hours)     Procedure Component Value Units Date/Time    XR Chest 1 View [[id number]] Collected: 04/05/23 1020     Updated: 04/05/23 1024    Narrative:      EXAM/TECHNIQUE: XR CHEST 1 VW-     INDICATION: dka protocol; Z98.890-Other specified postprocedural states;  Z74.09-Other reduced mobility; T81.42XA-Infection following a procedure,  deep incisional surgical site, initial encounter, swelling     COMPARISON: 04/01/2023     FINDINGS:     Cardiac silhouette is within normal limits and stable. No pleural  effusion, pneumothorax, or focal consolidation. No acute osseous  finding.       Impression:         No acute findings.  This report was finalized on 04/05/2023 10:21 by Dr. Mk Chinchilla MD.    CT Head Without Contrast [[id number]] Collected: 04/04/23 1324     Updated: 04/04/23 1338    Narrative:      EXAMINATION: CT head without  contrast 4/4/2023     HISTORY: Status post craniectomy and washout     DOSE: 850 A centimeter. All CT scans are performed using dose  optimization techniques as appropriate to the performed exam and  including at least one of the following: Automated exposure control,  adjustment of the mA and/or kV according to size, and the use of the  iterative reconstruction technique..     FINDINGS: Today's exam is compared to previous study of 3/31/2023.  Multiple contiguous axial images are obtained from the skull base to the  vertex per protocol without intravenous contrast enhancement with  reformatted images obtained in the sagittal and coronal projections from  the original data set.     The patient had undergone previous cranioplasty. The patient has  undergone craniectomy with removal of the previous artificial flap. The  previously noted collection both superficial and deep to the  cranioplasty has been evacuated. Postoperative air is present. There is  no evidence of fluid or hemorrhage within the subdural space. A surgical  drain is in place within the scalp soft tissues. There is  encephalomalacia within the right frontal lobe unchanged from previous  exam. Mild edema is noted within the right frontal cortex with  asymmetric sulcal effacement in comparison with the left frontal cortex.       Impression:      1.. Status post removal of the graft from the previous right frontal  cranioplasty. The fluid collection both superficial and deep to the  cranioplasty has been drained and debrided with no residual collection  identified. A surgical drain as well as postoperative air is present.  There is no evidence of hemorrhage or fluid within the subdural space at  this time. Again noted are encephalomalacia changes within the right  frontal lobe. There is mild edema within the right frontal cortex with  asymmetric sulcal effacement in comparison with the contralateral  frontal lobe. No evidence of intra-axial hemorrhage or  mass effect.  This report was finalized on 04/04/2023 13:35 by Dr. Zack Mendoza MD.    CT Chest Without Contrast Diagnostic [[id number]] Collected: 04/03/23 0723     Updated: 04/03/23 0738    Narrative:      EXAMINATION: CT CHEST WO CONTRAST DIAGNOSTIC-      4/3/2023 5:46 AM CDT     HISTORY: COPD, surveillance; Z74.09-Other reduced mobility     In order to have a CT radiation dose as low as reasonably achievable  Automated Exposure Control was utilized for adjustment of the mA and/or  KV according to patient size.     DLP in mGycm= 339     The CT scan of the chest is performed without intravenous contrast  enhancement.     The images are acquired in axial plane and subsequent reconstruction in  coronal and sagittal planes.     Comparison is made with the previous study dated 06/24/2022.     The lungs are poorly expanded with significant respiratory motion  artifacts.     There are groundglass opacities/infiltrate in the right upper lobe  anterior segment which were not seen in the previous study.     There is mild-to-moderate pulmonary vascular congestion. This is  moderate to more progressive since the previous study and partly may be  due to poor lung expansion.     There is a small bibasal pleural effusion which was not noted in the  previous study.     There is a small pleural-based nodule in the right upper lobe  anteriorly, image #58 in series 4, measuring 5 mm in greatest dimension.  This is unchanged. A focal pleural thickening/pleural based nodule is  seen in the left lower lobe anterolaterally, image #126 in series 4  which is also similar to the previous study. It measures 6 mm at the  base. No change.     The central airways patent. No endobronchial abnormality or nodule.     The limited visualized soft tissues of the neck are unremarkable. There  is moderate lobulated fullness of the thyroid gland. No discrete nodule  is identified. However due to absence of contrast enhancement and  isodense nodule  may not be excluded.     No axillary lymphadenopathy.     Severe atheromatous changes of the abdominal aorta is seen. No  aneurysmal dilatation.     Severe atheromatous changes of coronary arteries.     There is significant dilatation of central pulmonary arteries suggesting  pulmonary arterial hypertension.     Atheromatous changes of the coronary arteries.     Limited visualized unenhanced abdomen is unremarkable except for  lobulation/nodules in both adrenal glands, left more than the right.  Incompletely visualized significantly distended gallbladder seen. No  radiopaque stone in the visualized gallbladder. The pancreas and kidneys  are incompletely visualized and not evaluated.     Images reviewed in bone window show no acute bony abnormality or a bone  lesion.       Impression:      1. Groundglass opacity/nodule in the upper lobes may represent an  evolving infiltrate.  2. Pulmonary vascular congestion and small bibasal pleural effusion.  3. Pulmonary arterial hypertension.  4. Small pleural-based nodules in the lungs bilaterally are stable since  the previous study and probably represent chronic inflammatory process.  No features to suggest lung malignancy.           This report was finalized on 04/03/2023 07:35 by Dr. Michelle Vigil MD.          Objective     No Known Allergies    Medication Review: Performed  Current Facility-Administered Medications   Medication Dose Route Frequency Provider Last Rate Last Admin   • acetaminophen (TYLENOL) tablet 650 mg  650 mg Oral Q4H PRN Rahul Arnold APRN   650 mg at 04/02/23 0002    Or   • acetaminophen (TYLENOL) 160 MG/5ML solution 650 mg  650 mg Oral Q4H PRN Rahul Arnold APRN        Or   • acetaminophen (TYLENOL) suppository 650 mg  650 mg Rectal Q4H PRN Rahul Arnold APRN       • aspirin chewable tablet 81 mg  81 mg Oral Daily Rahul Arnold APRN   81 mg at 04/05/23 0811   • atorvastatin (LIPITOR) tablet 20 mg  20 mg Oral Nightly Rahul Arnold APRN   20 mg at  04/04/23 2113   • budesonide-formoterol (SYMBICORT) 160-4.5 MCG/ACT inhaler 2 puff  2 puff Inhalation BID - RT Rahul Arnold APRN   2 puff at 04/05/23 0610   • butalbital-acetaminophen-caffeine (FIORICET, ESGIC) -40 MG per tablet 1 tablet  1 tablet Oral Q6H PRN RustRahul APRN   1 tablet at 04/05/23 0327   • cefTRIAXone (ROCEPHIN) 1 g in sodium chloride 0.9 % 100 mL IVPB  1 g Intravenous Q24H RustRahul APRN 200 mL/hr at 04/05/23 0852 1 g at 04/05/23 0852   • dextrose (D50W) (25 g/50 mL) IV injection 10-50 mL  10-50 mL Intravenous Q15 Min PRN RustRahul APRN       • dextrose (GLUTOSE) oral gel 15 g  15 g Oral Q15 Min PRN RustRahul APRN       • dextrose 5 % and sodium chloride 0.45 % infusion  150 mL/hr Intravenous Continuous PRN RustRahul APRN       • dextrose 5 % and sodium chloride 0.45 % with KCl 20 mEq/L infusion  150 mL/hr Intravenous Continuous PRN FedericotRahul APRN       • dextrose 5 % and sodium chloride 0.45 % with KCl 40 mEq/L infusion  150 mL/hr Intravenous Continuous PRN RustRahul APRN       • dextrose 5 % and sodium chloride 0.9 % infusion  150 mL/hr Intravenous Continuous PRN RustRahul APRN       • dextrose 5 % and sodium chloride 0.9 % with KCl 20 mEq/L infusion  150 mL/hr Intravenous Continuous PRN RustRahul APRN       • dextrose 5 % and sodium chloride 0.9 % with KCl 40 mEq/L infusion  150 mL/hr Intravenous Continuous PRN RustRahul APRN       • digoxin (LANOXIN) tablet 125 mcg  125 mcg Oral Daily RustRahul APRN   125 mcg at 04/05/23 1324   • dilTIAZem CD (CARDIZEM CD) 24 hr capsule 240 mg  240 mg Oral Q24H RustRahul APRN   240 mg at 04/05/23 0811   • Enoxaparin Sodium (LOVENOX) syringe 120 mg  1 mg/kg Subcutaneous Q12H Flaco Portillo MD       • furosemide (LASIX) tablet 20 mg  20 mg Oral Daily Rahul Arnold APRN   20 mg at 04/05/23 0811   • gabapentin (NEURONTIN) capsule 300 mg  300 mg Oral TID Rahul Arnold APRN   300 mg at  04/05/23 0812   • glucagon (human recombinant) (GLUCAGEN DIAGNOSTIC) injection 1 mg  1 mg Intramuscular Q15 Min PRN Rahul Arnold APRN       • insulin regular (HumuLIN R,NovoLIN R) 100 Units in sodium chloride 0.9 % 100 mL (1 Units/mL) infusion  0-100 Units/hr Intravenous Titrated Rahul Arnold APRN 8.1 mL/hr at 04/05/23 1256 8.1 Units/hr at 04/05/23 1256   • ipratropium (ATROVENT) nebulizer solution 0.5 mg  0.5 mg Nebulization 4x Daily - RT Rahul Arnold APRN   0.5 mg at 04/05/23 1003   • lactated ringers infusion 1,000 mL  1,000 mL Intravenous Continuous Rahul Arnold APRN 25 mL/hr at 04/04/23 1820 Currently Infusing at 04/04/23 1820   • levETIRAcetam (KEPPRA) tablet 500 mg  500 mg Oral BID Rahul Arnold APRN   500 mg at 04/05/23 0811   • methylPREDNISolone sodium succinate (SOLU-Medrol) injection 20 mg  20 mg Intravenous Q12H Alia Gonsalves APRN       • metoprolol tartrate (LOPRESSOR) tablet 50 mg  50 mg Oral Q12H Rahul Arnold APRN   50 mg at 04/05/23 0812   • mupirocin (BACTROBAN) 2 % nasal ointment 1 application  1 application Each Nare BID Rahul Arnold APRN   1 application at 04/05/23 0811   • nicotine (NICODERM CQ) 14 MG/24HR patch 1 patch  1 patch Transdermal Daily PRN Rahul Arnold APRN       • ondansetron (ZOFRAN) tablet 4 mg  4 mg Oral Q6H PRN Rahul Arnold APRN        Or   • ondansetron (ZOFRAN) injection 4 mg  4 mg Intravenous Q6H PRN Rahul Arnold APRN       • oxyCODONE-acetaminophen (PERCOCET)  MG per tablet 1 tablet  1 tablet Oral Q4H PRN Rahul Arnold APRN   1 tablet at 04/05/23 1327   • oxyCODONE-acetaminophen (PERCOCET) 7.5-325 MG per tablet 1 tablet  1 tablet Oral Q4H PRN Rahul Arnold APRN   1 tablet at 04/02/23 2000   • pantoprazole (PROTONIX) EC tablet 40 mg  40 mg Oral Q AM Rahul Arnold APRN   40 mg at 04/05/23 0539   • Pharmacy to Dose enoxaparin (LOVENOX)   Does not apply Continuous PRN Rahul Arnold APRN       • polyethylene glycol (MIRALAX) packet 17 g  17 g Oral  Daily RustRahul APRN   17 g at 04/05/23 0811   • sennosides-docusate (PERICOLACE) 8.6-50 MG per tablet 1 tablet  1 tablet Oral Daily Rahul Arnold APRN   1 tablet at 04/05/23 0811   • sodium chloride 0.45 % 1,000 mL with potassium chloride 40 mEq infusion  250 mL/hr Intravenous Continuous PRN RustRahul APRN       • sodium chloride 0.45 % infusion  250 mL/hr Intravenous Continuous PRN RustRahul APRN       • sodium chloride 0.45 % with KCl 20 mEq/L infusion  250 mL/hr Intravenous Continuous PRN RustRahul APRN       • sodium chloride 0.9 % bolus  1,000 mL/hr Intravenous Continuous RustRahul APRN 1,000 mL/hr at 04/05/23 1145 1,000 mL/hr at 04/05/23 1145   • sodium chloride 0.9 % flush 10 mL  10 mL Intravenous Q12H RustRahul APRN   10 mL at 04/05/23 0813   • sodium chloride 0.9 % flush 10 mL  10 mL Intravenous PRN RustRahul APRN       • sodium chloride 0.9 % flush 10 mL  10 mL Intravenous Q12H RustRahul APRN   10 mL at 04/05/23 1146   • sodium chloride 0.9 % flush 10 mL  10 mL Intravenous PRN RustRahul APRN       • sodium chloride 0.9 % infusion 40 mL  40 mL Intravenous PRN RustRahul APRN       • sodium chloride 0.9 % infusion 40 mL  40 mL Intravenous PRN RustRahul APRN       • sodium chloride 0.9 % infusion  250 mL/hr Intravenous Continuous PRN RustRahul APRN       • sodium chloride 0.9 % with KCl 20 mEq/L infusion  250 mL/hr Intravenous Continuous PRN RustRahul APRN       • sodium chloride 0.9 % with KCl 40 mEq/L infusion  250 mL/hr Intravenous Continuous PRN RustRahul APRN           Vital Sign Min/Max for last 24 hours  Temp  Min: 97.4 °F (36.3 °C)  Max: 98.2 °F (36.8 °C)   BP  Min: 96/75  Max: 138/83   Pulse  Min: 85  Max: 130   Resp  Min: 12  Max: 28   SpO2  Min: 80 %  Max: 99 %   No data recorded   Weight  Min: 119 kg (263 lb 4.8 oz)  Max: 119 kg (263 lb 4.8 oz)     Flowsheet Rows    Flowsheet Row First Filed Value   Admission Height 177.8 cm  "(70\") Documented at 04/01/2023 1617   Admission Weight 117 kg (259 lb) Documented at 04/01/2023 1617          Results for orders placed during the hospital encounter of 03/31/23    Adult Transthoracic Echo Complete W/ Cont if Necessary Per Protocol    Interpretation Summary  •  Left ventricular systolic function is mildly decreased. Left ventricular ejection fraction appears to be 46 - 50%.  •  Left ventricular wall thickness is consistent with mild to moderate concentric hypertrophy.  •  The following left ventricular wall segments are hypokinetic: apical anterior, apical lateral, apical inferior, apical septal, apex hypokinetic and mid anteroseptal.  •  Left atrial volume is mildly increased.  •  Estimated right ventricular systolic pressure from tricuspid regurgitation is mildly elevated (35-45 mmHg).  •  Normal size and function the right ventricle.  •  No significant (greater than mild) valvular pathology.  •  Mild dilation of the aortic root is present.  •  Compared to prior exam from 6/19/2022, both studies were technically difficult, but upon my independent review of the previous exam, there did appear to be some mild apical hypokinesis present on that study that does look slightly more prominent on current exam.      Physical Exam:    General Appearance: Awake, alert, in no acute distress  Eyes: Pupils equal and reactive    Ears: Appear intact with no abnormalities noted  Nose: Nares normal, no drainage  Neck: supple, trachea midline, no carotid bruit and no JVD  Back: no kyphosis present,    Lungs: respirations regular, respirations even and respirations unlabored  Heart: normal S1, S2, irregular, 2/6 systolic murmur left sternal border  No gallops or rubs  no rub and no click  Abdomen: normal bowel sounds, no tenderness   Skin: no bleeding, bruising or rash  Extremities: no cyanosis  Psychiatric/Behavioral: Negative for agitation, behavioral problems, confusion, the patient does  appear to be " nervous/anxious.       Results Review:   I reviewed the patient's new clinical results.  I reviewed the patient's new imaging results and agree with the interpretation.  I reviewed the patient's other test results and agree with the interpretation  I personally viewed and interpreted the patient's EKG/Telemetry data    Discussed with patient  Updated patient regarding any new or relevant abnormalities on review of records or any new findings on physical exam.   Mentioned to patient about purpose of visit and desirable health short and long term goals and objectives.     Reviewed available prior notes, consults, prior visits, laboratory findings, radiology and cardiology relevant reports.   Updated chart as applicable.   I have reviewed the patient's medical history in detail and updated the computerized patient record as relevant.          Assessment & Plan       Swelling of scalp    Meningioma s/p craniotomy    Coronary artery disease    Type 2 diabetes mellitus with hyperglycemia and peripheral neuropathy, with long-term current use of insulin (HCC)    Paroxysmal atrial fibrillation with rapid ventricular response    Post-operative infection    Acute pulmonary edema due to A-fib RVR    Acute respiratory failure with hypoxia    Type 2 myocardial infarction due to arrhythmia    COPD (chronic obstructive pulmonary disease)    Obesity (BMI 30-39.9)    Tobacco abuse, in remission    Acute systolic heart failure    ADDIE (obstructive sleep apnea)      Plan    She is asking about discharge  Requested him to talk to Dr. Portillo  He has had elevated troponins  I will not perform any additional troponin measurements  He is doing well on current medical management  Overall his ventricular rates are under fair control  Do not anticipate given his current medical issues including recent surgery any additional cardiac testing  Continue on beta-blocker therapy as well as calcium channel blocker  Continue on digoxin  Monitor  kidney functions and electrolytes  Okay from my side to move to telemetry floor once interim issues are resolved    Shubham Kc MD  04/05/23  13:30 CDT    EMR Dragon/Transcription was used to dictate part of this note M

## 2023-04-05 NOTE — PLAN OF CARE
Goal Outcome Evaluation:   Pt started on insulin drip, tolerating well.  VSS. PRN pain med given x1. No acute signs of distress noted.

## 2023-04-05 NOTE — PROGRESS NOTES
[facility] PULMONARY AND CRITICAL CARE PROGRESS NOTE - [facility]    Patient: Elijah Escobar  1955   MR# [id number]   Acct# [id number]  04/05/23   06:44 CDT  Referring Provider: Flaco Portillo, *    Chief Complaint: Respiratory failure    Interval history: Afebrile.  Surgery yesterday uneventful.  He has a JUAN drain to the scalp.  Saturation 100 on 4 L.  Reduced to 2 L at the bedside glucose over 400.  Anion gap is 17.  Plan to reduce steroids significantly today.  STEPHANIE/ANCA came back negative.  Heart rate issues ongoing although stable.      Meds:  aspirin, 81 mg, Oral, Daily  atorvastatin, 20 mg, Oral, Nightly  budesonide-formoterol, 2 puff, Inhalation, BID - RT  cefTRIAXone, 1 g, Intravenous, Q24H  digoxin, 125 mcg, Oral, Daily  dilTIAZem CD, 240 mg, Oral, Q24H  enoxaparin, 1 mg/kg, Subcutaneous, Q12H  furosemide, 20 mg, Oral, Daily  gabapentin, 300 mg, Oral, TID  insulin detemir, 15 Units, Subcutaneous, Q12H  insulin lispro, 0-14 Units, Subcutaneous, TID AC  ipratropium, 0.5 mg, Nebulization, 4x Daily - RT  levETIRAcetam, 500 mg, Oral, BID  methylPREDNISolone sodium succinate, 60 mg, Intravenous, Q12H  metoprolol tartrate, 50 mg, Oral, Q12H  mupirocin, 1 application, Each Nare, BID  pantoprazole, 40 mg, Oral, Q AM  polyethylene glycol, 17 g, Oral, Daily  sennosides-docusate, 1 tablet, Oral, Daily  sodium chloride, 10 mL, Intravenous, Q12H      dilTIAZem, 5-15 mg/hr, Last Rate: Stopped (04/02/23 1600)  lactated ringers, 1,000 mL, Last Rate: 25 mL/hr at 04/04/23 1820  Pharmacy to Dose enoxaparin (LOVENOX),         Physical Exam:  SpO2 Percentage    04/05/23 0515 04/05/23 0530 04/05/23 0610   SpO2: 96% 97% 96%     Body mass index is 37.78 kg/m².   Temp:  [97.3 °F (36.3 °C)-98.3 °F (36.8 °C)] 97.9 °F (36.6 °C)  Heart Rate:  [] 107  Resp:  [12-28] 16  BP: ()/() 114/76    Intake/Output Summary (Last 24 hours) at 4/5/2023 0603  Last data filed at 4/5/2023 0327  Gross per 24  hour   Intake 1230 ml   Output 915 ml   Net 315 ml     Physical Exam   Constitutional:       Appearance: Normal appearance. He is obese. He is ill-appearing. He is not toxic-appearing.    HENT:      Head: Normocephalic. Right scalp drain     Right Ear: External ear normal.      Left Ear: External ear normal.      Nose: Nose normal. Nasal cannula     Mouth/Throat:      Mouth: Mucous membranes are moist.   Eyes:      General:         Right eye: No discharge.         Left eye: No discharge.      Conjunctiva/sclera: Conjunctivae normal.      Pupils: Pupils are equal, round, and reactive to light.   Cardiovascular:      Rate and Rhythm: Tachycardia present. Rhythm irregular.      Heart sounds: No murmur heard.  Pulmonary:      Effort: Pulmonary effort is normal. No respiratory distress.      Breath sounds: No wheezing.   Abdominal:      General: Abdomen is flat. Bowel sounds are normal. There is no distension.      Palpations: Abdomen is soft. There is no mass.   Musculoskeletal:      Cervical back: Neck supple.      Right lower leg: No edema.      Left lower leg: No edema.   Skin:     General: Skin is warm and dry.      Coloration: Skin is not jaundiced or pale.   Neurological:      General: No focal deficit present.      Mental Status: He is alert and oriented to person, place, and time.      Comments: Generally weak   Psychiatric:         Mood and Affect: Mood normal.         Behavior: Behavior normal.         Thought Content: Thought content normal.         Judgment: Judgment normal.        Electronically signed by ADITYA Olivera, 4/5/2023, 06:44 CDT      Physician substantive portion: medical decision making    Result Review    Laboratory Data:  Results from last 7 days   Lab Units 04/04/23  0805 04/03/23  0436 04/02/23  0613   WBC 10*3/mm3 14.40* 11.00* 17.92*   HEMOGLOBIN g/dL 11.6* 11.0* 11.9*   PLATELETS 10*3/mm3 234 188 192     Results from last 7 days   Lab Units 04/05/23  1519 04/05/23  1207  04/05/23  0304 04/03/23  0436 04/03/23  0040 04/02/23  0613 04/01/23  2340 04/01/23  0435 03/31/23  1249   SODIUM mmol/L 134* 132* 134*   < >  --    < >  --    < >  --    POTASSIUM mmol/L 3.6 3.5 3.7   < >  --    < >  --    < >  --    CO2 mmol/L 23.0 24.0 20.0*   < >  --    < >  --    < >  --    BUN mg/dL 45* 47* 46*   < >  --    < >  --    < >  --    CREATININE mg/dL 1.49* 1.46* 1.42*   < >  --    < >  --    < >  --    INR   --   --   --   --   --   --   --   --  1.16*   LACTATE mmol/L  --   --   --   --   --   --  2.1*   < >  --    CRP mg/dL  --   --   --   --  21.26*  --   --   --   --     < > = values in this interval not displayed.     Results from last 7 days   Lab Units 04/03/23  0423 04/02/23  1455 04/01/23 2054   PH, ARTERIAL pH units 7.482* 7.503* 7.449   PCO2, ARTERIAL mm Hg 32.9* 28.3* 30.7*   PO2 ART mm Hg 85.7 52.2* 52.9*   FIO2 % 70 80 100     Microbiology Results (last 10 days)     Procedure Component Value - Date/Time    Miscellaneous Micro Request - Specimen Not Sent, Specimen Not Sent [[id number]] Resulted: 04/05/23 1427    Lab Status: Final result Specimen: Specimen Not Sent Updated: 04/05/23 1427     Extra Tube Completed    Respiratory Culture - Sputum, Cough [[id number]] Collected: 04/03/23 1826    Lab Status: Final result Specimen: Sputum from Cough Updated: 04/04/23 1359     Respiratory Culture Rejected     Gram Stain Rare (1+) WBCs seen      Few (2+) Epithelial cells seen      Few (2+) Yeast with hyphae seen      Rare (1+) Gram positive cocci    Narrative:      Specimen rejected due to oropharyngeal contamination. Please reorder and recollect specimen if clinically necessary.    Legionella Antigen, Urine - Urine, Urine, Clean Catch [[id number]]  (Normal) Collected: 04/02/23 2148    Lab Status: Final result Specimen: Urine, Clean Catch Updated: 04/02/23 2213     LEGIONELLA ANTIGEN, URINE Negative    S. Pneumo Ag Urine or CSF - Urine, Urine, Clean Catch [[id number]]  (Normal) Collected: 04/02/23  2148    Lab Status: Final result Specimen: Urine, Clean Catch Updated: 04/02/23 2214     Strep Pneumo Ag Negative    Respiratory Panel PCR w/COVID-19(SARS-CoV-2) [facility] In-House, NP Swab in UTM/VTM, 3-4 HR TAT - Swab, Nasopharynx [[id number]]  (Normal) Collected: 04/02/23 1512    Lab Status: Final result Specimen: Swab from Nasopharynx Updated: 04/02/23 1608     ADENOVIRUS, PCR Not Detected     Coronavirus 229E Not Detected     Coronavirus HKU1 Not Detected     Coronavirus NL63 Not Detected     Coronavirus OC43 Not Detected     COVID19 Not Detected     Human Metapneumovirus Not Detected     Human Rhinovirus/Enterovirus Not Detected     Influenza A PCR Not Detected     Influenza B PCR Not Detected     Parainfluenza Virus 1 Not Detected     Parainfluenza Virus 2 Not Detected     Parainfluenza Virus 3 Not Detected     Parainfluenza Virus 4 Not Detected     RSV, PCR Not Detected     Bordetella pertussis pcr Not Detected     Bordetella parapertussis PCR Not Detected     Chlamydophila pneumoniae PCR Not Detected     Mycoplasma pneumo by PCR Not Detected    Narrative:      In the setting of a positive respiratory panel with a viral infection PLUS a negative procalcitonin without other underlying concern for bacterial infection, consider observing off antibiotics or discontinuation of antibiotics and continue supportive care. If the respiratory panel is positive for atypical bacterial infection (Bordetella pertussis, Chlamydophila pneumoniae, or Mycoplasma pneumoniae), consider antibiotic de-escalation to target atypical bacterial infection.    MRSA Screen, PCR (Inpatient) - Swab, Nares [[id number]]  (Normal) Collected: 04/02/23 1512    Lab Status: Final result Specimen: Swab from Nares Updated: 04/02/23 1636     MRSA PCR No MRSA Detected    Narrative:      The negative predictive value of this diagnostic test is high and should only be used to consider de-escalating anti-MRSA therapy. A positive result  may indicate colonization with MRSA and must be correlated clinically.    Wound Culture - Aspirate, Forehead [[id number]]  (Abnormal)  (Susceptibility) Collected: 04/01/23 1120    Lab Status: Edited Result - FINAL Specimen: Aspirate from Forehead Updated: 04/05/23 1426     Wound Culture Heavy growth (4+) Serratia marcescens     Gram Stain Many (4+) WBCs seen      No organisms seen    Susceptibility      Serratia marcescens      STALIN      Cefepime Susceptible      Ceftazidime Susceptible      Ceftriaxone Susceptible      Ertapenem Susceptible (C)  [1]       Gentamicin Susceptible      Levofloxacin Susceptible      Meropenem Susceptible (C)  [1]       Piperacillin + Tazobactam Susceptible  [2]       Tetracycline Resistant     Trimethoprim + Sulfamethoxazole Susceptible                   [1]  Appended report. These results have been appended to a previously final verified report.     [2]  Appended report. These results have been appended to a previously preliminary verified report.             Susceptibility Comments     Serratia marcescens    Cefazolin sensitivity will not be reported for Enterobacteriaceae in non-urine isolates. If cefazolin is preferred, please call the microbiology lab to request an E-test.  With the exception of urinary-sourced infections, aminoglycosides should not be used as monotherapy.             Urine Culture - Urine, Urine, Clean Catch [[id number]]  (Normal) Collected: 03/31/23 2346    Lab Status: Final result Specimen: Urine, Clean Catch Updated: 04/02/23 1051     Urine Culture No growth    AFB Culture - Cerebrospinal Fluid, Lumbar Puncture [[id number]] Collected: 03/31/23 1450    Lab Status: Preliminary result Specimen: Cerebrospinal Fluid from Lumbar Puncture Updated: 04/05/23 1531     AFB Culture No AFB isolated at less than 1 week     AFB Stain No acid fast bacilli seen on direct smear    Culture, CSF - Cerebrospinal Fluid, Lumbar Puncture [[id number]] Collected: 03/31/23 1450    Lab  Status: Final result Specimen: Cerebrospinal Fluid from Lumbar Puncture Updated: 04/04/23 0736     CSF Culture No growth at 3 days     Gram Stain No No organisms seen      Occasional WBCs seen    Blood Culture - Blood, Arm, Right [[id number]]  (Normal) Collected: 03/31/23 1251    Lab Status: Final result Specimen: Blood from Arm, Right Updated: 04/05/23 1330     Blood Culture No growth at 5 days    Blood Culture - Blood, Arm, Left [[id number]]  (Normal) Collected: 03/31/23 1249    Lab Status: Final result Specimen: Blood from Arm, Left Updated: 04/05/23 1330     Blood Culture No growth at 5 days         Recent films:  CT Head Without Contrast    Result Date: 4/4/2023  EXAMINATION: CT head without contrast 4/4/2023  HISTORY: Status post craniectomy and washout  DOSE: 850 A centimeter. All CT scans are performed using dose optimization techniques as appropriate to the performed exam and including at least one of the following: Automated exposure control, adjustment of the mA and/or kV according to size, and the use of the iterative reconstruction technique..  FINDINGS: Today's exam is compared to previous study of 3/31/2023. Multiple contiguous axial images are obtained from the skull base to the vertex per protocol without intravenous contrast enhancement with reformatted images obtained in the sagittal and coronal projections from the original data set.  The patient had undergone previous cranioplasty. The patient has undergone craniectomy with removal of the previous artificial flap. The previously noted collection both superficial and deep to the cranioplasty has been evacuated. Postoperative air is present. There is no evidence of fluid or hemorrhage within the subdural space. A surgical drain is in place within the scalp soft tissues. There is encephalomalacia within the right frontal lobe unchanged from previous exam. Mild edema is noted within the right frontal cortex with asymmetric sulcal effacement in  comparison with the left frontal cortex.      Impression: 1.. Status post removal of the graft from the previous right frontal cranioplasty. The fluid collection both superficial and deep to the cranioplasty has been drained and debrided with no residual collection identified. A surgical drain as well as postoperative air is present. There is no evidence of hemorrhage or fluid within the subdural space at this time. Again noted are encephalomalacia changes within the right frontal lobe. There is mild edema within the right frontal cortex with asymmetric sulcal effacement in comparison with the contralateral frontal lobe. No evidence of intra-axial hemorrhage or mass effect. This report was finalized on 04/04/2023 13:35 by Dr. Zack Mendoza MD.    XR Chest 1 View    Result Date: 4/5/2023  EXAM/TECHNIQUE: XR CHEST 1 VW-  INDICATION: dka protocol; Z98.890-Other specified postprocedural states; Z74.09-Other reduced mobility; T81.42XA-Infection following a procedure, deep incisional surgical site, initial encounter, swelling  COMPARISON: 04/01/2023  FINDINGS:  Cardiac silhouette is within normal limits and stable. No pleural effusion, pneumothorax, or focal consolidation. No acute osseous finding.      Impression:  No acute findings. This report was finalized on 04/05/2023 10:21 by Dr. Mk Chinchilla MD.     Personal review of imaging: CXR shows no infiltrates     Pulmonary Assessment:  1. Acute resp failure requiring bipap improved  2. Serratia wound infection  3. Hx meningioma  4. Afib    Recommend/plan:   · D/c bipap  · Continue oxygen  · Decrease steroids  · Continue abx    This visit was performed by both a physician and an [facility] RN.  I personally evaluated and examined the patient.  I performed all aspects of the medical decision making as documented.  Electronically signed by Ajay Root MD, 4/5/2023, 19:13 CDT

## 2023-04-05 NOTE — CONSULTS
Pt in CCU and restarted on Glucommander for hyperglycemia.  Admitted with A1C of 11.2%.  Has been receiving steroids this admit.  Alone in room at this time.  Will continue to follow.  Steroids should begin to be decreased soon. Since A1C elevated at admission, will need to speak with family.  Pt is alone in room at this time.

## 2023-04-06 NOTE — CASE MANAGEMENT/SOCIAL WORK
Continued Stay Note   [facility]     Patient Name: Elijah Escobar  MRN: [id number]  Today's Date: 4/6/2023    Admit Date: 3/31/2023    Plan: LTAC referral pending insurance precert   Discharge Plan     Row Name 04/06/23 1129       Plan    Plan LTAC referral pending insurance precert    Plan Comments LTAC can start insurance precert once start and stop date for IV antibiotics is determined.    Row Name 04/06/23 1104       Plan    Plan LTAC referral?    Plan Comments Consult received from ID regarding LTAC vs SNF.  Patient's insurance requires prior approval.  SW has informed Yvonne with [facility] of referral to see if patient will meet LTAC criteria.               Discharge Codes    No documentation.               Expected Discharge Date and Time     Expected Discharge Date Expected Discharge Time    Apr 3, 2023             TIANA Syed

## 2023-04-06 NOTE — PROGRESS NOTES
Infectious Diseases Progress Note    Patient:  Elijah Escobar  YOB: 1955  MRN: [id number]   Admit date: 3/31/2023   Admitting Physician: Flaco Portillo, *  Primary Care Physician: Brianna Martinez APRN    Chief Complaint/Interval History: He feels okay.  He is comfortable.  He has no headache.  No focal neurological complaints.  No nausea.  Good oral intake.  He is without fever.  No rash or diarrhea on his antibiotic treatment.  Drain remains in place.  Per discussion with nursing approximately 10 mL of output daily.    Intake/Output Summary (Last 24 hours) at 4/6/2023 1014  Last data filed at 4/6/2023 0645  Gross per 24 hour   Intake 5260.2 ml   Output 1350 ml   Net 3910.2 ml     Allergies: No Known Allergies  Current Scheduled Medications:   aspirin, 81 mg, Oral, Daily  atorvastatin, 20 mg, Oral, Nightly  budesonide-formoterol, 2 puff, Inhalation, BID - RT  digoxin, 125 mcg, Oral, Daily  dilTIAZem CD, 240 mg, Oral, Q24H  enoxaparin, 1 mg/kg, Subcutaneous, Q12H  furosemide, 20 mg, Oral, Daily  gabapentin, 300 mg, Oral, TID  ipratropium, 0.5 mg, Nebulization, 4x Daily - RT  levETIRAcetam, 500 mg, Oral, BID  meropenem, 1 g, Intravenous, Q8H  metoprolol tartrate, 50 mg, Oral, Q12H  mupirocin, 1 application, Each Nare, BID  pantoprazole, 40 mg, Oral, Q AM  polyethylene glycol, 17 g, Oral, Daily  sennosides-docusate, 1 tablet, Oral, Daily  sodium chloride, 10 mL, Intravenous, Q12H  sodium chloride, 10 mL, Intravenous, Q12H      Current PRN Medications:  •  acetaminophen **OR** acetaminophen **OR** acetaminophen  •  butalbital-acetaminophen-caffeine  •  dextrose  •  dextrose  •  dextrose 5 % and sodium chloride 0.45 %  •  dextrose 5 % and sodium chloride 0.45 % with KCl 20 mEq/L  •  dextrose 5 % and sodium chloride 0.45 % with KCl 40 mEq/L  •  dextrose 5 % and sodium chloride 0.9 %  •  dextrose 5 % and sodium chloride 0.9 % with KCl 20 mEq  •  dextrose 5% and sodium chloride 0.9% with KCl 40  "mEq/L  •  glucagon (human recombinant)  •  nicotine  •  ondansetron **OR** ondansetron  •  oxyCODONE-acetaminophen  •  oxyCODONE-acetaminophen  •  Pharmacy to Dose enoxaparin (LOVENOX)  •  custom IV KCl infusion builder  •  sodium chloride  •  sodium chloride 0.45 % with KCl 20 mEq  •  sodium chloride  •  sodium chloride  •  sodium chloride  •  sodium chloride  •  sodium chloride  •  sodium chloride 0.9 % with KCl 20 mEq  •  sodium chloride 0.9 % with KCl 40 mEq/L    Review of Systems see HPI    Vital Signs:  /73   Pulse 102   Temp 97.2 °F (36.2 °C) (Oral)   Resp 16   Ht 177.8 cm (70\")   Wt 122 kg (269 lb 8 oz)   SpO2 93%   BMI 38.67 kg/m²     Physical Exam  Vital signs - reviewed.  Line/IV site -without signs of infection  Moving all extremities equally  Abdomen is soft.  Nontender.  Skin without rash.    Lab Results:  CBC:   Results from last 7 days   Lab Units 04/06/23  0352 04/04/23  0805 04/03/23  0436 04/02/23  0613 04/01/23  0435 03/31/23  1020   WBC 10*3/mm3 13.39* 14.40* 11.00* 17.92* 19.05* 15.32*   HEMOGLOBIN g/dL 10.7* 11.6* 11.0* 11.9* 13.3 13.5   HEMATOCRIT % 31.6* 34.8* 34.5* 36.6* 42.2 40.8   PLATELETS 10*3/mm3 220 234 188 192 224 214     BMP:  Results from last 7 days   Lab Units 04/06/23  0831 04/06/23  0352 04/05/23  1939 04/05/23  1519 04/05/23  1207 04/05/23  0304 04/04/23  0745 04/03/23  0436 04/01/23  0559 04/01/23  0435 03/31/23  1020   SODIUM mmol/L 137 136 135* 134* 132* 134* 136 139   < > 137 138   POTASSIUM mmol/L 3.4* 3.5 3.5 3.6 3.5 3.7 3.3* 3.6   < > 3.9 4.0   CHLORIDE mmol/L 102 102 99 97* 96* 97* 98 102   < > 99 100   CO2 mmol/L 25.0 23.0 23.0 23.0 24.0 20.0* 21.0* 23.0   < > 16.0* 23.0   BUN mg/dL 40* 43* 46* 45* 47* 46* 42* 36*   < > 34* 22   CREATININE mg/dL 1.28* 1.19 1.50* 1.49* 1.46* 1.42* 1.36* 1.16   < > 1.20 1.18   GLUCOSE mg/dL 178* 159* 205* 331* 436* 408* 343* 102*   < > 261* 243*   CALCIUM mg/dL 7.9* 7.9* 8.0* 8.0* 7.9* 8.0* 8.3* 8.6   < > 8.7 8.9   ALT " (SGPT) U/L  --   --   --   --   --   --  10 11  --  11 11    < > = values in this interval not displayed.     Culture Results:   Blood Culture   Date Value Ref Range Status   03/31/2023 No growth at 5 days  Final   03/31/2023 No growth at 5 days  Final     Urine Culture   Date Value Ref Range Status   03/31/2023 No growth  Final     Wound Culture   Date Value Ref Range Status   04/01/2023 Heavy growth (4+) Serratia marcescens (A)  Final     Forehead aspirate 4/1/2023:  Susceptibility           Serratia marcescens       STALIN       Cefepime <=1 ug/ml Susceptible       Ceftazidime <=1 ug/ml Susceptible       Ceftriaxone <=1 ug/ml Susceptible       Gentamicin <=1 ug/ml Susceptible       Levofloxacin <=0.12 ug/ml Susceptible       Piperacillin + Tazobactam   Susceptible 1       Tetracycline >=16 ug/ml Resistant       Trimethoprim + Sulfamethoxazole <=20 ug/ml Susceptible      Radiology:     Head CT March 31, 2023:      Additional Studies Reviewed: None    Impression:   Infection right frontal cranioplasty.  Per review of operative note and infection present above and below the cranioplasty area, but all findings extradural.  It appears per operative note that any foreign material was removed. It appears there was infection in proximity to bone edge, but does not appear to be likely there is chronic osteomyelitis involving marginally viable bone.     Recommendations:   Continue treatment with meropenem  Would like to discuss further with neurosurgery whether we should be approaching this as extradural/soft tissue abscess or whether there is concern for any residual cranial osteomyelitis as a persistent nidus for recurrent infection.  If treating  soft tissue extradural abscess he may not need an extended course of IV antibiotic therapy.  If concerns for residual infection involving bone we may need to consider longer or intermediate course of IV antibiotic therapy followed by oral therapy with good oral absorption and bone  penetration (levofloxacin).  For IV treatment feel he would need LTAC or SNF  Would favor treatment with meropenem initially to decrease organism burden due to risks of development of resistance while on cephalosporin treatment due to potential for a chromosomal mediated beta-lactamase present in some Serratia organisms.  Will discuss with neurosurgery  Continue to follow    Edwin Jeffers MD

## 2023-04-06 NOTE — CASE MANAGEMENT/SOCIAL WORK
Discharge Planning Assessment  [facility]     Patient Name: Elijah Escobar  MRN: [id number]  Today's Date: 4/6/2023    Admit Date: 3/31/2023        Discharge Needs Assessment     Row Name 04/06/23 1055       Living Environment    People in Home spouse    Name(s) of People in Home Ruth Ann    Current Living Arrangements home    Primary Care Provided by spouse/significant other    Provides Primary Care For no one    Family Caregiver if Needed spouse    Family Caregiver Names Ruth Ann       Transition Planning    Patient/Family Anticipates Transition to home with family    Transportation Anticipated family or friend will provide       Discharge Needs Assessment    Readmission Within the Last 30 Days no previous admission in last 30 days    Equipment Currently Used at Home cane, straight    Concerns to be Addressed discharge planning    Equipment Needed After Discharge none    Outpatient/Agency/Support Group Needs infusion therapy, home    Discharge Facility/Level of Care Needs home with home health    Discharge Coordination/Progress spoke to patient who lives with spouse; has RX coverage and PCP; independent at home prior to illness will follow for DC needs               Discharge Plan    No documentation.               Continued Care and Services - Admitted Since 3/31/2023    Coordination has not been started for this encounter.       Expected Discharge Date and Time     Expected Discharge Date Expected Discharge Time    Apr 3, 2023          Demographic Summary    No documentation.                Functional Status    No documentation.                Psychosocial    No documentation.                Abuse/Neglect    No documentation.                Legal    No documentation.                Substance Abuse    No documentation.                Patient Forms    No documentation.                   Ivett Yee RN

## 2023-04-06 NOTE — PROGRESS NOTES
"    [facility] [place] PULMONARY AND CRITICAL CARE PROGRESS NOTE - [facility]    Patient: Elijah Escobar  1955   MR# [id number]   Acct# [id number]  04/06/23   07:02 CDT  Referring Provider: Flaco Portillo, *    Chief Complaint: Respiratory failure      Interval history: Afebrile. Saturation 96 on 2L. He has a JUAN drain to the scalp.  He is on an insulin gtt.  this morning. Heart rate issues ongoing although much improved. ID now following for antibiotics.  He is less jovial this morning.  He indicates he is under some stress due to inability to tend to personal matters while he is in the hospital.   He needs to pay some bills.  \"They have turned my water off.  I just need to be able to get out of here so I can take care of things\".  No breathing issues to report.  No visitors present    Meds:  aspirin, 81 mg, Oral, Daily  atorvastatin, 20 mg, Oral, Nightly  budesonide-formoterol, 2 puff, Inhalation, BID - RT  digoxin, 125 mcg, Oral, Daily  dilTIAZem CD, 240 mg, Oral, Q24H  enoxaparin, 1 mg/kg, Subcutaneous, Q12H  furosemide, 20 mg, Oral, Daily  gabapentin, 300 mg, Oral, TID  ipratropium, 0.5 mg, Nebulization, 4x Daily - RT  levETIRAcetam, 500 mg, Oral, BID  meropenem, 1 g, Intravenous, Q8H  metoprolol tartrate, 50 mg, Oral, Q12H  mupirocin, 1 application, Each Nare, BID  pantoprazole, 40 mg, Oral, Q AM  polyethylene glycol, 17 g, Oral, Daily  sennosides-docusate, 1 tablet, Oral, Daily  sodium chloride, 10 mL, Intravenous, Q12H  sodium chloride, 10 mL, Intravenous, Q12H      dextrose 5 % and sodium chloride 0.45 %, 150 mL/hr  dextrose 5 % and sodium chloride 0.45 % with KCl 20 mEq/L, 150 mL/hr, Last Rate: 150 mL/hr (04/05/23 6425)  dextrose 5 % and sodium chloride 0.45 % with KCl 40 mEq/L, 150 mL/hr  dextrose 5 % and sodium chloride 0.9 %, 150 mL/hr  dextrose 5 % and sodium chloride 0.9 % with KCl 20 mEq, 150 mL/hr  dextrose 5% and sodium chloride 0.9% with KCl 40 mEq/L, 150 mL/hr  insulin, 0-100 " Units/hr, Last Rate: 5.7 Units/hr (04/06/23 0605)  Pharmacy to Dose enoxaparin (LOVENOX),   custom IV KCl infusion builder, 250 mL/hr  sodium chloride, 250 mL/hr  sodium chloride 0.45 % with KCl 20 mEq, 250 mL/hr, Last Rate: Stopped (04/05/23 1758)  sodium chloride, 250 mL/hr  sodium chloride 0.9 % with KCl 20 mEq, 250 mL/hr  sodium chloride 0.9 % with KCl 40 mEq/L, 250 mL/hr        Physical Exam:  SpO2 Percentage    04/06/23 0400 04/06/23 0430 04/06/23 0530   SpO2: 94% 90% 96%     Body mass index is 38.67 kg/m².   Temp:  [97.7 °F (36.5 °C)-98.2 °F (36.8 °C)] 97.7 °F (36.5 °C)  Heart Rate:  [] 79  Resp:  [10-23] 10  BP: ()/(64-97) 127/69    Intake/Output Summary (Last 24 hours) at 4/6/2023 0702  Last data filed at 4/6/2023 0645  Gross per 24 hour   Intake 5360.2 ml   Output 1350 ml   Net 4010.2 ml     Physical Exam   Constitutional:       Appearance: Normal appearance. He is obese. He is ill-appearing. He is not toxic-appearing.    HENT:      Head: Normocephalic. Right scalp drain     Right Ear: External ear normal.      Left Ear: External ear normal.      Nose: Nose normal. Nasal cannula     Mouth/Throat:      Mouth: Mucous membranes are moist.   Eyes:      General:         Right eye: No discharge.         Left eye: No discharge.      Conjunctiva/sclera: Conjunctivae normal.      Pupils: Pupils are equal, round, and reactive to light.   Cardiovascular:      Rate and Rhythm: Rhythm irregular, rate 76     Heart sounds: No murmur heard.   Pulmonary:      Effort: Pulmonary effort is normal. No respiratory distress.      Breath sounds: No wheezing.   Abdominal:      General: Abdomen is flat. Bowel sounds are normal. There is no distension.      Palpations: Abdomen is soft. There is no mass.   Musculoskeletal:      Cervical back: Neck supple.      Right lower leg: No edema.      Left lower leg: No edema.   Skin:     General: Skin is warm and dry.      Coloration: Skin is not jaundiced or pale.   Neurological:       General: No focal deficit present.      Mental Status: He is alert and oriented to person, place, and time.      Comments: Generally weak   Psychiatric:         Mood and Affect: Mood normal.         Behavior: Behavior normal.         Thought Content: Thought content normal.         Judgment: Judgment normal.     Electronically signed by ADITYA Olivera, 4/6/2023, 07:02 CDT      Physician substantive portion: medical decision making    Result Review    Laboratory Data:  Results from last 7 days   Lab Units 04/06/23  0352 04/04/23  0805 04/03/23  0436   WBC 10*3/mm3 13.39* 14.40* 11.00*   HEMOGLOBIN g/dL 10.7* 11.6* 11.0*   PLATELETS 10*3/mm3 220 234 188     Results from last 7 days   Lab Units 04/06/23  0831 04/06/23  0352 04/05/23  1939 04/03/23  0436 04/03/23  0040 04/02/23  0613 04/01/23  2340 04/01/23  0435 03/31/23  1249   SODIUM mmol/L 137 136 135*   < >  --    < >  --    < >  --    POTASSIUM mmol/L 3.4* 3.5 3.5   < >  --    < >  --    < >  --    CO2 mmol/L 25.0 23.0 23.0   < >  --    < >  --    < >  --    BUN mg/dL 40* 43* 46*   < >  --    < >  --    < >  --    CREATININE mg/dL 1.28* 1.19 1.50*   < >  --    < >  --    < >  --    INR   --   --   --   --   --   --   --   --  1.16*   LACTATE mmol/L  --   --   --   --   --   --  2.1*   < >  --    CRP mg/dL  --   --   --   --  21.26*  --   --   --   --     < > = values in this interval not displayed.     Results from last 7 days   Lab Units 04/03/23  0423 04/02/23  1455 04/01/23  2054   PH, ARTERIAL pH units 7.482* 7.503* 7.449   PCO2, ARTERIAL mm Hg 32.9* 28.3* 30.7*   PO2 ART mm Hg 85.7 52.2* 52.9*   FIO2 % 70 80 100     Microbiology Results (last 10 days)     Procedure Component Value - Date/Time    Miscellaneous Micro Request - Specimen Not Sent, Specimen Not Sent [[id number]] Resulted: 04/05/23 1427    Lab Status: Final result Specimen: Specimen Not Sent Updated: 04/05/23 1427     Extra Tube Completed    Respiratory Culture - Sputum, Cough  [[id number]] Collected: 04/03/23 1826    Lab Status: Final result Specimen: Sputum from Cough Updated: 04/04/23 1359     Respiratory Culture Rejected     Gram Stain Rare (1+) WBCs seen      Few (2+) Epithelial cells seen      Few (2+) Yeast with hyphae seen      Rare (1+) Gram positive cocci    Narrative:      Specimen rejected due to oropharyngeal contamination. Please reorder and recollect specimen if clinically necessary.    Legionella Antigen, Urine - Urine, Urine, Clean Catch [[id number]]  (Normal) Collected: 04/02/23 2148    Lab Status: Final result Specimen: Urine, Clean Catch Updated: 04/02/23 2213     LEGIONELLA ANTIGEN, URINE Negative    S. Pneumo Ag Urine or CSF - Urine, Urine, Clean Catch [[id number]]  (Normal) Collected: 04/02/23 2148    Lab Status: Final result Specimen: Urine, Clean Catch Updated: 04/02/23 2214     Strep Pneumo Ag Negative    Respiratory Panel PCR w/COVID-19(SARS-CoV-2) [facility]/[place]/[facility] In-House, NP Swab in UTM/VTM, 3-4 HR TAT - Swab, Nasopharynx [[id number]]  (Normal) Collected: 04/02/23 1512    Lab Status: Final result Specimen: Swab from Nasopharynx Updated: 04/02/23 1608     ADENOVIRUS, PCR Not Detected     Coronavirus 229E Not Detected     Coronavirus HKU1 Not Detected     Coronavirus NL63 Not Detected     Coronavirus OC43 Not Detected     COVID19 Not Detected     Human Metapneumovirus Not Detected     Human Rhinovirus/Enterovirus Not Detected     Influenza A PCR Not Detected     Influenza B PCR Not Detected     Parainfluenza Virus 1 Not Detected     Parainfluenza Virus 2 Not Detected     Parainfluenza Virus 3 Not Detected     Parainfluenza Virus 4 Not Detected     RSV, PCR Not Detected     Bordetella pertussis pcr Not Detected     Bordetella parapertussis PCR Not Detected     Chlamydophila pneumoniae PCR Not Detected     Mycoplasma pneumo by PCR Not Detected    Narrative:      In the setting of a positive respiratory panel with a viral infection PLUS a negative  procalcitonin without other underlying concern for bacterial infection, consider observing off antibiotics or discontinuation of antibiotics and continue supportive care. If the respiratory panel is positive for atypical bacterial infection (Bordetella pertussis, Chlamydophila pneumoniae, or Mycoplasma pneumoniae), consider antibiotic de-escalation to target atypical bacterial infection.    MRSA Screen, PCR (Inpatient) - Swab, Nares [[id number]]  (Normal) Collected: 04/02/23 1512    Lab Status: Final result Specimen: Swab from Nares Updated: 04/02/23 1636     MRSA PCR No MRSA Detected    Narrative:      The negative predictive value of this diagnostic test is high and should only be used to consider de-escalating anti-MRSA therapy. A positive result may indicate colonization with MRSA and must be correlated clinically.    Wound Culture - Aspirate, Forehead [[id number]]  (Abnormal)  (Susceptibility) Collected: 04/01/23 1120    Lab Status: Edited Result - FINAL Specimen: Aspirate from Forehead Updated: 04/05/23 1426     Wound Culture Heavy growth (4+) Serratia marcescens     Gram Stain Many (4+) WBCs seen      No organisms seen    Susceptibility      Serratia marcescens      STALIN      Cefepime Susceptible      Ceftazidime Susceptible      Ceftriaxone Susceptible      Ertapenem Susceptible (C)  [1]       Gentamicin Susceptible      Levofloxacin Susceptible      Meropenem Susceptible (C)  [1]       Piperacillin + Tazobactam Susceptible  [2]       Tetracycline Resistant     Trimethoprim + Sulfamethoxazole Susceptible                   [1]  Appended report. These results have been appended to a previously final verified report.     [2]  Appended report. These results have been appended to a previously preliminary verified report.             Susceptibility Comments     Serratia marcescens    Cefazolin sensitivity will not be reported for Enterobacteriaceae in non-urine isolates. If cefazolin is preferred, please call the  microbiology lab to request an E-test.  With the exception of urinary-sourced infections, aminoglycosides should not be used as monotherapy.             Urine Culture - Urine, Urine, Clean Catch [[id number]]  (Normal) Collected: 03/31/23 2346    Lab Status: Final result Specimen: Urine, Clean Catch Updated: 04/02/23 1051     Urine Culture No growth    AFB Culture - Cerebrospinal Fluid, Lumbar Puncture [[id number]] Collected: 03/31/23 1450    Lab Status: Preliminary result Specimen: Cerebrospinal Fluid from Lumbar Puncture Updated: 04/05/23 1531     AFB Culture No AFB isolated at less than 1 week     AFB Stain No acid fast bacilli seen on direct smear    Culture, CSF - Cerebrospinal Fluid, Lumbar Puncture [[id number]] Collected: 03/31/23 1450    Lab Status: Final result Specimen: Cerebrospinal Fluid from Lumbar Puncture Updated: 04/04/23 0736     CSF Culture No growth at 3 days     Gram Stain No No organisms seen      Occasional WBCs seen    Blood Culture - Blood, Arm, Right [[id number]]  (Normal) Collected: 03/31/23 1251    Lab Status: Final result Specimen: Blood from Arm, Right Updated: 04/05/23 1330     Blood Culture No growth at 5 days    Blood Culture - Blood, Arm, Left [[id number]]  (Normal) Collected: 03/31/23 1249    Lab Status: Final result Specimen: Blood from Arm, Left Updated: 04/05/23 1330     Blood Culture No growth at 5 days         Recent films:  CT Head Without Contrast    Result Date: 4/4/2023  EXAMINATION: CT head without contrast 4/4/2023  HISTORY: Status post craniectomy and washout  DOSE: 850 A centimeter. All CT scans are performed using dose optimization techniques as appropriate to the performed exam and including at least one of the following: Automated exposure control, adjustment of the mA and/or kV according to size, and the use of the iterative reconstruction technique..  FINDINGS: Today's exam is compared to previous study of 3/31/2023. Multiple contiguous axial images are obtained from the  skull base to the vertex per protocol without intravenous contrast enhancement with reformatted images obtained in the sagittal and coronal projections from the original data set.  The patient had undergone previous cranioplasty. The patient has undergone craniectomy with removal of the previous artificial flap. The previously noted collection both superficial and deep to the cranioplasty has been evacuated. Postoperative air is present. There is no evidence of fluid or hemorrhage within the subdural space. A surgical drain is in place within the scalp soft tissues. There is encephalomalacia within the right frontal lobe unchanged from previous exam. Mild edema is noted within the right frontal cortex with asymmetric sulcal effacement in comparison with the left frontal cortex.      Impression: 1.. Status post removal of the graft from the previous right frontal cranioplasty. The fluid collection both superficial and deep to the cranioplasty has been drained and debrided with no residual collection identified. A surgical drain as well as postoperative air is present. There is no evidence of hemorrhage or fluid within the subdural space at this time. Again noted are encephalomalacia changes within the right frontal lobe. There is mild edema within the right frontal cortex with asymmetric sulcal effacement in comparison with the contralateral frontal lobe. No evidence of intra-axial hemorrhage or mass effect. This report was finalized on 04/04/2023 13:35 by Dr. Zack Mendoza MD.    XR Chest 1 View    Result Date: 4/5/2023  EXAM/TECHNIQUE: XR CHEST 1 VW-  INDICATION: dka protocol; Z98.890-Other specified postprocedural states; Z74.09-Other reduced mobility; T81.42XA-Infection following a procedure, deep incisional surgical site, initial encounter, swelling  COMPARISON: 04/01/2023  FINDINGS:  Cardiac silhouette is within normal limits and stable. No pleural effusion, pneumothorax, or focal consolidation. No acute  osseous finding.      Impression:  No acute findings. This report was finalized on 04/05/2023 10:21 by Dr. Mk Chinchilla MD.         Pulmonary Assessment:  1. Acute resp failure better  2. Scalp infection    Recommend/plan:   · Discussed with ID  · Continue support  · Continue abx per ID/neurosurgery  · Titrate off oxygen  · D/c steroids  · No additional plans; will sign off, available    This visit was performed by both a physician and an Advanced Practice RN.  I personally evaluated and examined the patient.  I performed all aspects of the medical decision making as documented.  Electronically signed by Ajay Root MD, 4/6/2023, 10:23 CDT

## 2023-04-06 NOTE — PROGRESS NOTES
[facility] Medicine Services  INPATIENT PROGRESS NOTE    Patient Name: Elijah Escobar  Date of Admission: 3/31/2023  Today's Date: 04/06/23  Length of Stay: 6  Primary Care Physician: Brianna Martinez APRN    Subjective   Chief Complaint: Consulted for medical management.   HPI   I had worked on adjusting his insulin therapy yesterday and the primary service came behind me and started him on Glucommander protocol.    Most recent blood sugars 178, 206, 196.      Steroids have finally been stopped.    Atrial fibrillation continues in the 100s.  I gave him an extra dose of IV digoxin yesterday.  Dr. Kc saw him today and made no changes.    The patient continues to ask about discharge, but that is not up to me.    Review of Systems   All pertinent negatives and positives are as above. All other systems have been reviewed and are negative unless otherwise stated.     Objective    Temp:  [97.2 °F (36.2 °C)-98.2 °F (36.8 °C)] 97.2 °F (36.2 °C)  Heart Rate:  [] 110  Resp:  [10-23] 17  BP: ()/(64-97) 128/73  Physical Exam  Constitutional:       Appearance: He is obese. He is ill-appearing (chronically).      Comments: Up in bed.  No distress.  No family present. Discussed with his nurse, Dianelys.    HENT:      Head: Normocephalic.      Comments: Bandaging around his head.   Eyes:      Conjunctiva/sclera: Conjunctivae normal.      Pupils: Pupils are equal, round, and reactive to light.   Neck:      Vascular: No JVD.   Cardiovascular:      Rate and Rhythm: Tachycardia present. Rhythm irregular.      Heart sounds: Normal heart sounds.      Comments: In atrial fibrillation with rates in the 100s.   Pulmonary:      Effort: Pulmonary effort is normal. No respiratory distress.      Breath sounds: No wheezing or rales.      Comments: On 2L.   Abdominal:      General: Bowel sounds are normal. There is no distension.      Palpations: Abdomen is soft.      Tenderness: There is no  abdominal tenderness.   Musculoskeletal:         General: Swelling (trace) present. No tenderness or deformity. Normal range of motion.   Skin:     General: Skin is warm and dry.      Findings: No rash.   Neurological:      Mental Status: He is alert and oriented to person, place, and time.      Cranial Nerves: No cranial nerve deficit.      Motor: No abnormal muscle tone.      Deep Tendon Reflexes: Reflexes normal.   Psychiatric:         Mood and Affect: Mood normal.         Behavior: Behavior normal.         Thought Content: Thought content normal.         Judgment: Judgment normal.         Intake/Output Summary (Last 24 hours) at 4/6/2023 1018  Last data filed at 4/6/2023 0645  Gross per 24 hour   Intake 5260.2 ml   Output 1350 ml   Net 3910.2 ml     Results Review:  I have reviewed the labs, radiology results, and diagnostic studies.    Laboratory Data:   Results from last 7 days   Lab Units 04/06/23  0352 04/04/23  0805 04/03/23  0436   WBC 10*3/mm3 13.39* 14.40* 11.00*   HEMOGLOBIN g/dL 10.7* 11.6* 11.0*   HEMATOCRIT % 31.6* 34.8* 34.5*   PLATELETS 10*3/mm3 220 234 188     Results from last 7 days   Lab Units 04/06/23  0831 04/06/23  0352 04/05/23  1939 04/05/23  0304 04/04/23  0745 04/03/23  0436 04/01/23  0559 04/01/23  0435   SODIUM mmol/L 137 136 135*   < > 136 139   < > 137   POTASSIUM mmol/L 3.4* 3.5 3.5   < > 3.3* 3.6   < > 3.9   CHLORIDE mmol/L 102 102 99   < > 98 102   < > 99   CO2 mmol/L 25.0 23.0 23.0   < > 21.0* 23.0   < > 16.0*   BUN mg/dL 40* 43* 46*   < > 42* 36*   < > 34*   CREATININE mg/dL 1.28* 1.19 1.50*   < > 1.36* 1.16   < > 1.20   CALCIUM mg/dL 7.9* 7.9* 8.0*   < > 8.3* 8.6   < > 8.7   BILIRUBIN mg/dL  --   --   --   --  0.5 0.7  --  1.2   ALK PHOS U/L  --   --   --   --  85 89  --  111   ALT (SGPT) U/L  --   --   --   --  10 11  --  11   AST (SGOT) U/L  --   --   --   --  13 22  --  28   GLUCOSE mg/dL 178* 159* 205*   < > 343* 102*   < > 261*    < > = values in this interval not  displayed.     I have reviewed the patient's current medications.     Assessment/Plan   Assessment  Active Hospital Problems    Diagnosis    • **Swelling of scalp    • Post-operative infection    • Meningioma s/p craniotomy    • Type 2 diabetes mellitus with hyperglycemia and peripheral neuropathy, with long-term current use of insulin (HCC)    • Acute systolic heart failure    • Type 2 myocardial infarction due to arrhythmia    • Acute pulmonary edema due to A-fib RVR    • Paroxysmal atrial fibrillation with rapid ventricular response    • Coronary artery disease    • COPD (chronic obstructive pulmonary disease)    • Obesity (BMI 30-39.9)    • Tobacco abuse, in remission    • ADDIE (obstructive sleep apnea)    • Acute respiratory failure with hypoxia      Treatment Plan  The patient was admitted on 3/31 by Dr. Portillo with the neurosurgical service.  He has a history of craniotomy in June 2022 that became infected and required washout in July 2022.  He then required a craniectomy in October 2022.  He presented and had a right frontal scalp fluid collection.  Spinal fluid was clean.  Surgical intervention was planned, but the patient had other issues going on including uncontrolled hyperglycemia and atrial fibrillation with rapid ventricular response.  Hospital medicine was consulted to assist with the above.    The scalp was also aspirated and he ultimately grew Serratia.  This was resistant to tetracycline.  He has been receiving vancomycin and Zosyn.  I de-escalated his antibiotic coverage to ceftriaxone monotherapy on 4/4. He underwent I&D on 4/4 with Dr. Portillo. Dr. Santos with ID is now seeing and has placed him on Merrem for now. Case discussed with him today.     He has been seen by cardiology.  He has had some difficulties with paroxysmal atrial fibrillation with rapid ventricular response.  He is on therapeutic Lovenox and aspirin.  Efforts have been made at rate control with Cardizem CD and digoxin.  Echocardiogram showed an EF of 46-50%.  He also had some hypokinesis of some segments.  Study was also technically difficult.  Currently on oral Lasix.  Not presently on ACE/ARB.  Not presently on Toprol-XL/carvedilol, but metoprolol tartrate.    He has been seen by pulmonology.  His pulmonary infiltrates are likely more secondary to CHF than pneumonia.  He has been on antibiotics to cover his scalp infection.  Pulmonology also had him on IV steroids recently (which exacerbated his blood sugars).  Continue efforts at pulmonary toilet.  Wean oxygen as tolerated. Back on 2L after surgery.     He has a history of diabetes.  His hemoglobin A1c was 11.0 in October 2022 and is 11.0 now. He is on Levemir in the home setting.  He at one point was on an insulin drip and then placed on one again by NS on 4/5.  Steroids have finally been stopped.  Most recent glucoses 178, 206, 196.  We will try to return to basal bolus insulin today.    DVT prophylaxis is achieved by being on therapeutic Lovenox.    Medical Decision Making  Number and Complexity of problems: The main issue requiring hospitalization was 1 acute, moderately complex problem in the form of his scalp infection where he has previously had craniotomy to remove any meningioma.  Differential Diagnosis: Superficial versus deeper infection.    Conditions and Status        Condition is improving.     [facility] Data  External documents reviewed: Reviewed prior [facility] records.  Cardiac tracing (EKG, telemetry) interpretation: Continues in atrial fibrillation on telemetry at present.  Radiology interpretation: No recent studies.  Reviewed admission studies.  Labs reviewed: As above.  Any tests that were considered but not ordered: None considered at present.     Decision rules/scores evaluated (example [id number], Wells, etc): [id number] score is 4.     Discussed with: The patient, Dr. Santos, and his nurse, Dianelys.     Care Planning  Shared decision making: Consented to  admission for further treatment.  Code status and discussions: Full with full interventions.    Disposition  Social Determinants of Health that impact treatment or disposition: Nothing negative identified at present.  I expect the patient to be discharged to per the neurosurgical service.      Electronically signed by Joey Moore DO, 04/06/23, 10:18 CDT.

## 2023-04-06 NOTE — PLAN OF CARE
Goal Outcome Evaluation:              Outcome Evaluation: Pt is AOx4, follows commands. A Fib 80's-110's throughout the night. BP stable. On 2L NC. Voiding. Spoke to Dr. Stvee about transitioning pt off insulin gtt if labs came back good this am, MD ordered to keep pt on insulin gtt even if gap closed. Insulin gtt currently infusing at 5.7, last blood sugar 153. IVF infusing per protocol.

## 2023-04-06 NOTE — CASE MANAGEMENT/SOCIAL WORK
Continued Stay Note   [place]     Patient Name: Elijah Escobar  MRN: [id number]  Today's Date: 4/6/2023    Admit Date: 3/31/2023    Plan: LTAC referral?   Discharge Plan     Row Name 04/06/23 1104       Plan    Plan LTAC referral?    Plan Comments Consult received from ID regarding LTAC vs SNF.  Patient's insurance requires prior approval.  SW has informed Yvonne with [facility] of referral to see if patient will meet LTAC criteria.               Discharge Codes    No documentation.               Expected Discharge Date and Time     Expected Discharge Date Expected Discharge Time    Apr 3, 2023             TIANA Syed

## 2023-04-06 NOTE — PROGRESS NOTES
Neurosurgery Daily Progress Note    HPI:  Elijah Escobar is a 67 y.o. male with a significant medical history of hypertension, diabetes, hyperlipidemia, seizures, craniotomy for tumor (6/16/2022), craniotomy for washout (7/1/2022), craniectomy (10/4/2022), coronary artery disease, diabetes, erectile dysfunction, GERD, hypertension, hyperlipidemia, tobacco abuse, and obesity.  He presents today with a complaint of a 4-5 days onset of progressively worsening right frontal, temporal, and periorbital edema and associated symptoms to include, but not limited to generalized fatigue, chills, dyspnea, intermittent nausea without vomiting, and states he's felt feverish.  Physical exam findings of neurologically intact with right frontal, temporal, and periorbital swelling.  WBC elevated at 15.  3 to an CRP elevated at 14.75.  Imaging pending.    Assessment:   Past Medical History:   Diagnosis Date    Brain tumor     Coronary artery disease     COVID-19 vaccine series completed     MODERNA X 3; LAST DOSE 3/2022    Diabetes     Erectile dysfunction     GERD (gastroesophageal reflux disease)     Hypercholesteremia     Hypertension     Seizure      Active Hospital Problems    Diagnosis     **Swelling of scalp     COPD (chronic obstructive pulmonary disease)     Obesity (BMI 30-39.9)     Tobacco abuse, in remission     Acute systolic heart failure     ADDIE (obstructive sleep apnea)     Type 2 myocardial infarction due to arrhythmia     Acute pulmonary edema due to A-fib RVR     Acute respiratory failure with hypoxia     Post-operative infection     Paroxysmal atrial fibrillation with rapid ventricular response     Type 2 diabetes mellitus with hyperglycemia and peripheral neuropathy, with long-term current use of insulin (HCC)     Coronary artery disease     Meningioma s/p craniotomy      Plan:   Neuro: Stable, intact/at baseline  Infected cranial flap      2 Days Post-Op (4/4/2023)  craniectomy and washout   Postop CT stable. No  "acute abnormalities.   Flap tapped I&D cultures 4+ gram-negative bacilli    Heavy 4+ Serratia marcescens   ID consulted.  Appreciate assistance.  JUAN = 10 mL, keep  Remain in ICU for close neurologic monitoring  Continue neuro exams per policy.  Call for decline    CV: AFib with RVR and heart strain and trop bump.  Stabilized    Appreciate Cards  Pulm: Possible PNA.  Resolved.  Chest x-ray clear  :  No patel.  UTI.  Resolved  FEN: Reg diet for now   Fluid balance for sepsis vs pulmonary edema   N.p.o. since midnight  Endocrine: Glucose improving.  Continue Glucomander protocol   GI: SHERIF  ID: WBC normalizing, CRP and ESR elevated   Infected cranial flap  CSF: no growth to date   PNA.  Resolved   UTI.  Resolved   Continue meropenem per ID.  Appreciate assist  Heme:  DVT prophylaxis  Pain: Tolerable at present  Dispo: PT/OT      Chief complaint:   4-5 days onset of progressively worsening right frontal, temporal, and periorbital edema and associated symptoms to include, but not limited to generalized fatigue, chills, dyspnea, intermittent nausea without vomiting, and states he's felt feverish.    HPI  Subjective  Doing well    Temp:  [97.2 °F (36.2 °C)-98.2 °F (36.8 °C)] 97.2 °F (36.2 °C)  Heart Rate:  [] 90  Resp:  [10-23] 16  BP: ()/(64-97) 112/76    Output by Drain (mL) 04/05/23 0701 - 04/05/23 1900 04/05/23 1901 - 04/06/23 0700 04/06/23 0701 - 04/06/23 0910 Range Total   Closed/Suction Drain 1 Right Scalp Bulb 7 Fr.         Objective:  Vital signs: (most recent): Blood pressure 112/76, pulse 90, temperature 97.2 °F (36.2 °C), temperature source Oral, resp. rate 16, height 177.8 cm (70\"), weight 122 kg (269 lb 8 oz), SpO2 93 %.      Neurologic Exam     Mental Status   Oriented to person, place, and time.   Speech: speech is normal   Level of consciousness: alert    Oriented x3.  Follows commands without prompting showing thumbs up and 2 fingers bilaterally.     Cranial Nerves     CN III, IV, VI   Pupils " are equal, round, and reactive to light.  Extraocular motions are normal.     CN V   Facial sensation intact.     CN VII   Facial expression full, symmetric.     Motor Exam   Right arm pronator drift: absent  Left arm pronator drift: absent    Strength   Right deltoid: 5/5  Left deltoid: 5/5  Right biceps: 5/5  Left biceps: 5/5  Right triceps: 5/5  Left triceps: 5/5  Right iliopsoas: 5/5  Left iliopsoas: 5/5  Right quadriceps: 5/5  Left quadriceps: 5/5  Right gastroc: 5/5  Left gastroc: 5/5  Moves all extremities equal and symmetric     Sensory Exam   Light touch normal.     Drains: * No LDAs found *    Imaging Results (Last 24 Hours)       Procedure Component Value Units Date/Time    XR Chest 1 View [[id number]] Collected: 04/05/23 1020     Updated: 04/05/23 1024    Narrative:      EXAM/TECHNIQUE: XR CHEST 1 VW-     INDICATION: dka protocol; Z98.890-Other specified postprocedural states;  Z74.09-Other reduced mobility; T81.42XA-Infection following a procedure,  deep incisional surgical site, initial encounter, swelling     COMPARISON: 04/01/2023     FINDINGS:     Cardiac silhouette is within normal limits and stable. No pleural  effusion, pneumothorax, or focal consolidation. No acute osseous  finding.       Impression:         No acute findings.  This report was finalized on 04/05/2023 10:21 by Dr. Mk Chinchilla MD.          Lab Results (last 24 hours)       Procedure Component Value Units Date/Time    Magnesium [[id number]]  (Normal) Collected: 04/06/23 0831    Specimen: Blood Updated: 04/06/23 0856     Magnesium 2.3 mg/dL     Basic Metabolic Panel [[id number]]  (Abnormal) Collected: 04/06/23 0831    Specimen: Blood Updated: 04/06/23 0856     Glucose 178 mg/dL      BUN 40 mg/dL      Creatinine 1.28 mg/dL      Sodium 137 mmol/L      Potassium 3.4 mmol/L      Chloride 102 mmol/L      CO2 25.0 mmol/L      Calcium 7.9 mg/dL      BUN/Creatinine Ratio 31.3     Anion Gap 10.0 mmol/L      eGFR 61.3 mL/min/1.73      Narrative:      GFR Normal >60  Chronic Kidney Disease <60  Kidney Failure <15      Phosphorus [[id number]]  (Normal) Collected: 04/06/23 0831    Specimen: Blood Updated: 04/06/23 0856     Phosphorus 2.8 mg/dL     POC Glucose Once [[id number]]  (Abnormal) Collected: 04/06/23 0723    Specimen: Blood Updated: 04/06/23 0735     Glucose 197 mg/dL      Comment: : [id number] Damon Guaman AMeter ID: [id number]       POC Glucose Once [[id number]]  (Abnormal) Collected: 04/06/23 0604    Specimen: Blood Updated: 04/06/23 0616     Glucose 153 mg/dL      Comment: : [id number] Scott JamieMeter ID: [id number]       POC Glucose Once [[id number]]  (Abnormal) Collected: 04/06/23 0506    Specimen: Blood Updated: 04/06/23 0518     Glucose 148 mg/dL      Comment: : [id number] Scott JamieMeter ID: [id number]       Magnesium [[id number]]  (Normal) Collected: 04/06/23 0352    Specimen: Blood Updated: 04/06/23 0436     Magnesium 2.3 mg/dL     Basic Metabolic Panel [[id number]]  (Abnormal) Collected: 04/06/23 0352    Specimen: Blood Updated: 04/06/23 0436     Glucose 159 mg/dL      BUN 43 mg/dL      Creatinine 1.19 mg/dL      Sodium 136 mmol/L      Potassium 3.5 mmol/L      Chloride 102 mmol/L      CO2 23.0 mmol/L      Calcium 7.9 mg/dL      BUN/Creatinine Ratio 36.1     Anion Gap 11.0 mmol/L      eGFR 67.0 mL/min/1.73     Narrative:      GFR Normal >60  Chronic Kidney Disease <60  Kidney Failure <15      Phosphorus [[id number]]  (Normal) Collected: 04/06/23 0352    Specimen: Blood Updated: 04/06/23 0436     Phosphorus 3.0 mg/dL     CBC (No Diff) [[id number]]  (Abnormal) Collected: 04/06/23 0352    Specimen: Blood Updated: 04/06/23 0416     WBC 13.39 10*3/mm3      RBC 3.47 10*6/mm3      Hemoglobin 10.7 g/dL      Hematocrit 31.6 %      MCV 91.1 fL      MCH 30.8 pg      MCHC 33.9 g/dL      RDW 11.9 %      RDW-SD 39.8 fl      MPV 11.2 fL      Platelets 220 10*3/mm3     POC Glucose Once [[id number]]  (Abnormal) Collected: 04/06/23  0401    Specimen: Blood Updated: 04/06/23 0412     Glucose 160 mg/dL      Comment: : [id number] Scott JamieMeter ID: [id number]       POC Glucose Once [[id number]]  (Abnormal) Collected: 04/06/23 0305    Specimen: Blood Updated: 04/06/23 0316     Glucose 140 mg/dL      Comment: : [id number] Scott JamieMeter ID: [id number]       Phosphorus [[id number]]  (Normal) Collected: 04/06/23 0142    Specimen: Blood Updated: 04/06/23 0214     Phosphorus 3.4 mg/dL     POC Glucose Once [[id number]]  (Normal) Collected: 04/06/23 0203    Specimen: Blood Updated: 04/06/23 0214     Glucose 130 mg/dL      Comment: : [id number] Scott JamieMeter ID: [id number]       POC Glucose Once [[id number]]  (Normal) Collected: 04/06/23 0102    Specimen: Blood Updated: 04/06/23 0113     Glucose 130 mg/dL      Comment: : [id number] Scott AntwonieMeter ID: [id number]       POC Glucose Once [[id number]]  (Abnormal) Collected: 04/05/23 2357    Specimen: Blood Updated: 04/06/23 0008     Glucose 138 mg/dL      Comment: : [id number] Scott JamieMeter ID: [id number]       POC Glucose Once [[id number]]  (Abnormal) Collected: 04/05/23 2241    Specimen: Blood Updated: 04/05/23 2252     Glucose 200 mg/dL      Comment: : [id number] Scott JamieMeter ID: [id number]       POC Glucose Once [[id number]]  (Abnormal) Collected: 04/05/23 2137    Specimen: Blood Updated: 04/05/23 2147     Glucose 206 mg/dL      Comment: : [id number] Scott JamieMeter ID: [id number]       POC Glucose Once [[id number]]  (Abnormal) Collected: 04/05/23 1932    Specimen: Blood Updated: 04/05/23 2046     Glucose 287 mg/dL      Comment: : [id number] Scott JamieMeter ID: [id number]       POC Glucose Once [[id number]]  (Abnormal) Collected: 04/05/23 1831    Specimen: Blood Updated: 04/05/23 2046     Glucose 171 mg/dL      Comment: : ema HitchcockMeter ID: [id number]       POC Glucose Once [[id number]]  (Abnormal) Collected:  04/05/23 2034    Specimen: Blood Updated: 04/05/23 2046     Glucose 224 mg/dL      Comment: : [id number] Joshua Merino ID: [id number]       Magnesium [[id number]]  (Normal) Collected: 04/05/23 1939    Specimen: Blood Updated: 04/05/23 2006     Magnesium 2.3 mg/dL     Basic Metabolic Panel [[id number]]  (Abnormal) Collected: 04/05/23 1939    Specimen: Blood Updated: 04/05/23 2006     Glucose 205 mg/dL      BUN 46 mg/dL      Creatinine 1.50 mg/dL      Sodium 135 mmol/L      Potassium 3.5 mmol/L      Chloride 99 mmol/L      CO2 23.0 mmol/L      Calcium 8.0 mg/dL      BUN/Creatinine Ratio 30.7     Anion Gap 13.0 mmol/L      eGFR 50.7 mL/min/1.73     Narrative:      GFR Normal >60  Chronic Kidney Disease <60  Kidney Failure <15      Phosphorus [[id number]]  (Normal) Collected: 04/05/23 1939    Specimen: Blood Updated: 04/05/23 2006     Phosphorus 3.1 mg/dL     POC Glucose Once [[id number]]  (Abnormal) Collected: 04/05/23 1731    Specimen: Blood Updated: 04/05/23 1752     Glucose 191 mg/dL      Comment: : ema StarkneyMeter ID: [id number]       POC Glucose Once [[id number]]  (Abnormal) Collected: 04/05/23 1627    Specimen: Blood Updated: 04/05/23 1639     Glucose 295 mg/dL      Comment: : ema StarkneyMeter ID: [id number]       Magnesium [[id number]]  (Normal) Collected: 04/05/23 1519    Specimen: Blood Updated: 04/05/23 1553     Magnesium 2.3 mg/dL     Basic Metabolic Panel [[id number]]  (Abnormal) Collected: 04/05/23 1519    Specimen: Blood Updated: 04/05/23 1553     Glucose 331 mg/dL      BUN 45 mg/dL      Creatinine 1.49 mg/dL      Sodium 134 mmol/L      Potassium 3.6 mmol/L      Comment: Slight hemolysis detected by analyzer. Results may be affected.        Chloride 97 mmol/L      CO2 23.0 mmol/L      Calcium 8.0 mg/dL      BUN/Creatinine Ratio 30.2     Anion Gap 14.0 mmol/L      eGFR 51.1 mL/min/1.73     Narrative:      GFR Normal >60  Chronic Kidney Disease <60  Kidney Failure  <15      Phosphorus [[id number]]  (Normal) Collected: 04/05/23 1519    Specimen: Blood Updated: 04/05/23 1553     Phosphorus 3.0 mg/dL     AFB Culture - Cerebrospinal Fluid, Lumbar Puncture [[id number]] Collected: 03/31/23 1450    Specimen: Cerebrospinal Fluid from Lumbar Puncture Updated: 04/05/23 1531     AFB Culture No AFB isolated at less than 1 week     AFB Stain No acid fast bacilli seen on direct smear    POC Glucose Once [[id number]]  (Abnormal) Collected: 04/05/23 1506    Specimen: Blood Updated: 04/05/23 1517     Glucose 322 mg/dL      Comment: : [id number] Tawanda StacyMeter ID: [id number]       Miscellaneous Micro Request - Specimen Not Sent, Specimen Not Sent [[id number]] Resulted: 04/05/23 1427    Specimen: Specimen Not Sent Updated: 04/05/23 1427     Extra Tube Completed    Wound Culture - Aspirate, Forehead [[id number]]  (Abnormal)  (Susceptibility) Collected: 04/01/23 1120    Specimen: Aspirate from Forehead Updated: 04/05/23 1426     Wound Culture Heavy growth (4+) Serratia marcescens     Gram Stain Many (4+) WBCs seen      No organisms seen    Susceptibility        Serratia marcescens      STALIN      Cefepime Susceptible      Ceftazidime Susceptible      Ceftriaxone Susceptible      Ertapenem Susceptible (C)  [1]       Gentamicin Susceptible      Levofloxacin Susceptible      Meropenem Susceptible (C)  [1]       Piperacillin + Tazobactam Susceptible  [2]       Tetracycline Resistant     Trimethoprim + Sulfamethoxazole Susceptible                   [1]  Appended report. These results have been appended to a previously final verified report.     [2]  Appended report. These results have been appended to a previously preliminary verified report.               Susceptibility Comments       Serratia marcescens    Cefazolin sensitivity will not be reported for Enterobacteriaceae in non-urine isolates. If cefazolin is preferred, please call the microbiology lab to request an E-test.  With the exception of  urinary-sourced infections, aminoglycosides should not be used as monotherapy.               POC Glucose Once [[id number]]  (Abnormal) Collected: 04/05/23 1403    Specimen: Blood Updated: 04/05/23 1414     Glucose 339 mg/dL      Comment: : nadinenuno Alarcon CorettaMeter ID: [id number]       Blood Culture - Blood, Arm, Right [[id number]]  (Normal) Collected: 03/31/23 1251    Specimen: Blood from Arm, Right Updated: 04/05/23 1330     Blood Culture No growth at 5 days    Blood Culture - Blood, Arm, Left [[id number]]  (Normal) Collected: 03/31/23 1249    Specimen: Blood from Arm, Left Updated: 04/05/23 1330     Blood Culture No growth at 5 days    POC Glucose Once [[id number]]  (Abnormal) Collected: 04/05/23 1254    Specimen: Blood Updated: 04/05/23 1308     Glucose 384 mg/dL      Comment: : ema HitchcockMeter ID: [id number]       Basic Metabolic Panel [[id number]]  (Abnormal) Collected: 04/05/23 1207    Specimen: Blood Updated: 04/05/23 1252     Glucose 436 mg/dL      BUN 47 mg/dL      Creatinine 1.46 mg/dL      Sodium 132 mmol/L      Potassium 3.5 mmol/L      Chloride 96 mmol/L      CO2 24.0 mmol/L      Calcium 7.9 mg/dL      BUN/Creatinine Ratio 32.2     Anion Gap 12.0 mmol/L      eGFR 52.4 mL/min/1.73     Narrative:      GFR Normal >60  Chronic Kidney Disease <60  Kidney Failure <15      Magnesium [[id number]]  (Normal) Collected: 04/05/23 1207    Specimen: Blood Updated: 04/05/23 1233     Magnesium 2.3 mg/dL     Phosphorus [[id number]]  (Normal) Collected: 04/05/23 1207    Specimen: Blood Updated: 04/05/23 1231     Phosphorus 3.3 mg/dL     POC Glucose Once [[id number]]  (Abnormal) Collected: 04/05/23 1145    Specimen: Blood Updated: 04/05/23 1202     Glucose 431 mg/dL      Comment: : [id number] Tawanda StacyMeter ID: [id number]       High Sensitivity Troponin T 2Hr [[id number]]  (Abnormal) Collected: 04/05/23 1005    Specimen: Blood Updated: 04/05/23 1056     HS Troponin T 573 ng/L      Troponin T Delta  -10 ng/L     Narrative:      High Sensitive Troponin T Reference Range:  <10.0 ng/L- Negative Female for AMI  <15.0 ng/L- Negative Male for AMI  >=10 - Abnormal Female indicating possible myocardial injury.  >=15 - Abnormal Male indicating possible myocardial injury.   Clinicians would have to utilize clinical acumen, EKG, Troponin, and serial changes to determine if it is an Acute Myocardial Infarction or myocardial injury due to an underlying chronic condition.         Hemoglobin A1c [[id number]]  (Abnormal) Collected: 04/05/23 0833    Specimen: Blood Updated: 04/05/23 1041     Hemoglobin A1C 11.20 %     Narrative:      Hemoglobin A1C Ranges:    Increased Risk for Diabetes  5.7% to 6.4%  Diabetes                     >= 6.5%  Diabetic Goal                < 7.0%    Osmolality, Serum [[id number]]  (Abnormal) Collected: 04/05/23 0833    Specimen: Blood Updated: 04/05/23 0931     Osmolality 311 mOsm/kg           Rahul Arnold, APRN

## 2023-04-06 NOTE — PROGRESS NOTES
LOS: 6 days   Patient Care Team:  Brianna Martinez APRN as PCP - General (Nurse Practitioner)  Rahul Arnold APRN as Nurse Practitioner (Nurse Practitioner)  Flaco Portillo MD as Surgeon (Neurosurgery)    Chief Complaint: Cardiology consulted for rapid atrial fibrillation     Subjective    Elijah Escobar is a 67 y.o. male who is being seen in follow-up  Overall hemodynamically stable  No chest pain  No palpitation  No presyncope  No syncope  Has held the dressing on his scalp  Ventricular rates are under good control  No new issues or events  Patient asking regarding going home  Nursing by bedside  Labs as referenced below with normalization of kidney functions  Electrolytes are normal  Leukocytosis is improved     Review of Systems   Constitutional: No chills   Has fatigue   No fever.   HENT: Negative.    Eyes: Negative.    Respiratory: Negative for cough,   No chest wall soreness,   Shortness of breath,   no wheezing, no stridor.    Cardiovascular: As above  Gastrointestinal: Negative for abdominal distention,  No abdominal pain,   No blood in stool,   No constipation,   No diarrhea,   No nausea   No vomiting.   Endocrine: Negative.    Genitourinary: Negative for difficulty urinating, dysuria, flank pain and hematuria.   Musculoskeletal: Negative.    Skin: Negative for rash and wound.   Allergic/Immunologic: Negative.    Neurological: Negative for dizziness, syncope, weakness,   No light-headedness  No  headaches.   Hematological: Does not bruise/bleed easily.   Psychiatric/Behavioral: Negative for agitation or behavioral problems,   No confusion,   the patient is  nervous/anxious.       History:   Past Medical History:   Diagnosis Date   • Brain tumor    • Coronary artery disease    • COVID-19 vaccine series completed     MODERNA X 3; LAST DOSE 3/2022   • Diabetes    • Erectile dysfunction    • GERD (gastroesophageal reflux disease)    • Hypercholesteremia    • Hypertension    • Seizure      Past  Surgical History:   Procedure Laterality Date   • BALLOON ANGIOPLASTY, ARTERY Right    • COLONOSCOPY     • CRANIECTOMY Right 2022    Procedure: CRANIECTOMY WITH INCISIONAL WASHOUT;  Surgeon: Felipe Banerjee MD;  Location:  [facility];  Service: Neurosurgery;  Laterality: Right;   • CRANIOPLASTY Right 10/4/2022    Procedure: CRANIOPLASTY right with Lumbar drain;  Surgeon: Flaco Portillo MD;  Location:  [facility];  Service: Neurosurgery;  Laterality: Right;   • CRANIOPLASTY N/A 2023    Procedure: CRANIOPLASTY, removal, right, horseshoe;  Surgeon: Flaco Portillo MD;  Location:  [facility];  Service: Neurosurgery;  Laterality: N/A;   • CRANIOTOMY FOR TUMOR Right 2022    Procedure: CRANIOTOMY FOR TUMOR STERIOTACTIC WITH BRAIN LAB, right;  Surgeon: Flaco Portillo MD;  Location:  [facility];  Service: Neurosurgery;  Laterality: Right;     Social History     Socioeconomic History   • Marital status:    Tobacco Use   • Smoking status: Former     Packs/day: 0.25     Years: 15.00     Pack years: 3.75     Types: Cigarettes     Quit date: 3/15/2023     Years since quittin.0   • Smokeless tobacco: Never   Vaping Use   • Vaping Use: Never used   Substance and Sexual Activity   • Alcohol use: Never   • Drug use: Never   • Sexual activity: Defer     Family History   Problem Relation Age of Onset   • Cancer Mother         liver   • Heart disease Father        Labs:  WBC WBC   Date Value Ref Range Status   2023 13.39 (H) 3.40 - 10.80 10*3/mm3 Final   2023 14.40 (H) 3.40 - 10.80 10*3/mm3 Final      HGB Hemoglobin   Date Value Ref Range Status   2023 10.7 (L) 13.0 - 17.7 g/dL Final   2023 11.6 (L) 13.0 - 17.7 g/dL Final      HCT Hematocrit   Date Value Ref Range Status   2023 31.6 (L) 37.5 - 51.0 % Final   2023 34.8 (L) 37.5 - 51.0 % Final      Platelets Platelets   Date Value Ref Range Status   2023 220 140 - 450 10*3/mm3 Final   2023 234 140  - 450 10*3/mm3 Final      MCV MCV   Date Value Ref Range Status   04/06/2023 91.1 79.0 - 97.0 fL Final   04/04/2023 91.1 79.0 - 97.0 fL Final        Results from last 7 days   Lab Units 04/06/23  0352 04/05/23  1939 04/05/23  1519 04/05/23  0304 04/04/23  0745 04/03/23  0436 04/01/23  0559 04/01/23  0435   SODIUM mmol/L 136 135* 134*   < > 136 139   < > 137   POTASSIUM mmol/L 3.5 3.5 3.6   < > 3.3* 3.6   < > 3.9   CHLORIDE mmol/L 102 99 97*   < > 98 102   < > 99   CO2 mmol/L 23.0 23.0 23.0   < > 21.0* 23.0   < > 16.0*   BUN mg/dL 43* 46* 45*   < > 42* 36*   < > 34*   CREATININE mg/dL 1.19 1.50* 1.49*   < > 1.36* 1.16   < > 1.20   CALCIUM mg/dL 7.9* 8.0* 8.0*   < > 8.3* 8.6   < > 8.7   BILIRUBIN mg/dL  --   --   --   --  0.5 0.7  --  1.2   ALK PHOS U/L  --   --   --   --  85 89  --  111   ALT (SGPT) U/L  --   --   --   --  10 11  --  11   AST (SGOT) U/L  --   --   --   --  13 22  --  28   GLUCOSE mg/dL 159* 205* 331*   < > 343* 102*   < > 261*    < > = values in this interval not displayed.     Lab Results   Component Value Date    TROPONINT 573 (C) 04/05/2023     PT/INR:    No results found for: PROTIME/  No results found for: INR    Imaging Results (Last 72 Hours)     Procedure Component Value Units Date/Time    XR Chest 1 View [[id number]] Collected: 04/05/23 1020     Updated: 04/05/23 1024    Narrative:      EXAM/TECHNIQUE: XR CHEST 1 VW-     INDICATION: dka protocol; Z98.890-Other specified postprocedural states;  Z74.09-Other reduced mobility; T81.42XA-Infection following a procedure,  deep incisional surgical site, initial encounter, swelling     COMPARISON: 04/01/2023     FINDINGS:     Cardiac silhouette is within normal limits and stable. No pleural  effusion, pneumothorax, or focal consolidation. No acute osseous  finding.       Impression:         No acute findings.  This report was finalized on 04/05/2023 10:21 by Dr. Mk Chinchilla MD.    CT Head Without Contrast [[id number]] Collected: 04/04/23 1754      Updated: 04/04/23 1338    Narrative:      EXAMINATION: CT head without contrast 4/4/2023     HISTORY: Status post craniectomy and washout     DOSE: 850 A centimeter. All CT scans are performed using dose  optimization techniques as appropriate to the performed exam and  including at least one of the following: Automated exposure control,  adjustment of the mA and/or kV according to size, and the use of the  iterative reconstruction technique..     FINDINGS: Today's exam is compared to previous study of 3/31/2023.  Multiple contiguous axial images are obtained from the skull base to the  vertex per protocol without intravenous contrast enhancement with  reformatted images obtained in the sagittal and coronal projections from  the original data set.     The patient had undergone previous cranioplasty. The patient has  undergone craniectomy with removal of the previous artificial flap. The  previously noted collection both superficial and deep to the  cranioplasty has been evacuated. Postoperative air is present. There is  no evidence of fluid or hemorrhage within the subdural space. A surgical  drain is in place within the scalp soft tissues. There is  encephalomalacia within the right frontal lobe unchanged from previous  exam. Mild edema is noted within the right frontal cortex with  asymmetric sulcal effacement in comparison with the left frontal cortex.       Impression:      1.. Status post removal of the graft from the previous right frontal  cranioplasty. The fluid collection both superficial and deep to the  cranioplasty has been drained and debrided with no residual collection  identified. A surgical drain as well as postoperative air is present.  There is no evidence of hemorrhage or fluid within the subdural space at  this time. Again noted are encephalomalacia changes within the right  frontal lobe. There is mild edema within the right frontal cortex with  asymmetric sulcal effacement in comparison with  the contralateral  frontal lobe. No evidence of intra-axial hemorrhage or mass effect.  This report was finalized on 04/04/2023 13:35 by Dr. Zack Mendoza MD.          Objective     No Known Allergies    Medication Review: Performed  Current Facility-Administered Medications   Medication Dose Route Frequency Provider Last Rate Last Admin   • acetaminophen (TYLENOL) tablet 650 mg  650 mg Oral Q4H PRN RustRahul APRN   650 mg at 04/02/23 0002    Or   • acetaminophen (TYLENOL) 160 MG/5ML solution 650 mg  650 mg Oral Q4H PRN RustRahul APRN        Or   • acetaminophen (TYLENOL) suppository 650 mg  650 mg Rectal Q4H PRN RustRahul APRN       • aspirin chewable tablet 81 mg  81 mg Oral Daily FedericotRahul APRN   81 mg at 04/05/23 0811   • atorvastatin (LIPITOR) tablet 20 mg  20 mg Oral Nightly Rahul Arnold APRN   20 mg at 04/05/23 2043   • budesonide-formoterol (SYMBICORT) 160-4.5 MCG/ACT inhaler 2 puff  2 puff Inhalation BID - RT Rahul Arnold APRN   2 puff at 04/06/23 0700   • butalbital-acetaminophen-caffeine (FIORICET, ESGIC) -40 MG per tablet 1 tablet  1 tablet Oral Q6H PRN Rahul Arnold APRN   1 tablet at 04/05/23 0327   • dextrose (D50W) (25 g/50 mL) IV injection 10-50 mL  10-50 mL Intravenous Q15 Min PRN Rahul Arnold APRN       • dextrose (GLUTOSE) oral gel 15 g  15 g Oral Q15 Min PRN FedericotRahul APRN       • dextrose 5 % and sodium chloride 0.45 % infusion  150 mL/hr Intravenous Continuous PRN Rahul Arnold APRN       • dextrose 5 % and sodium chloride 0.45 % with KCl 20 mEq/L infusion  150 mL/hr Intravenous Continuous PRN Rahul Arnold APRN 150 mL/hr at 04/05/23 2315 150 mL/hr at 04/05/23 2315   • dextrose 5 % and sodium chloride 0.45 % with KCl 40 mEq/L infusion  150 mL/hr Intravenous Continuous PRN Rahul Arnold APRN       • dextrose 5 % and sodium chloride 0.9 % infusion  150 mL/hr Intravenous Continuous PRN Rahul Arnold APRN       • dextrose 5 % and sodium chloride 0.9 %  with KCl 20 mEq/L infusion  150 mL/hr Intravenous Continuous PRN Rahul Arnold APRN       • dextrose 5 % and sodium chloride 0.9 % with KCl 40 mEq/L infusion  150 mL/hr Intravenous Continuous PRN Rahul Arnold APRN       • digoxin (LANOXIN) tablet 125 mcg  125 mcg Oral Daily Rahul Arnold APRN   125 mcg at 04/05/23 1324   • dilTIAZem CD (CARDIZEM CD) 24 hr capsule 240 mg  240 mg Oral Q24H Rahul Arnold APRN   240 mg at 04/05/23 0811   • Enoxaparin Sodium (LOVENOX) syringe 120 mg  1 mg/kg Subcutaneous Q12H Flaco Portillo MD   120 mg at 04/05/23 2043   • furosemide (LASIX) tablet 20 mg  20 mg Oral Daily Rahul Arnold APRN   20 mg at 04/05/23 0811   • gabapentin (NEURONTIN) capsule 300 mg  300 mg Oral TID Rahul Arnold APRN   300 mg at 04/05/23 2043   • glucagon (human recombinant) (GLUCAGEN DIAGNOSTIC) injection 1 mg  1 mg Intramuscular Q15 Min PRN Rahul Arnold APRN       • insulin regular (HumuLIN R,NovoLIN R) 100 Units in sodium chloride 0.9 % 100 mL (1 Units/mL) infusion  0-100 Units/hr Intravenous Titrated Rahul Arnold APRN 5.7 mL/hr at 04/06/23 0605 5.7 Units/hr at 04/06/23 0605   • ipratropium (ATROVENT) nebulizer solution 0.5 mg  0.5 mg Nebulization 4x Daily - RT Rahul Arnold APRN   0.5 mg at 04/06/23 0659   • levETIRAcetam (KEPPRA) tablet 500 mg  500 mg Oral BID Rahul Arnold APRN   500 mg at 04/05/23 2043   • meropenem (MERREM) 1 g in sodium chloride 0.9 % 100 mL IVPB  1 g Intravenous Q8H Edwin Santos MD   1 g at 04/06/23 0551   • metoprolol tartrate (LOPRESSOR) tablet 50 mg  50 mg Oral Q12H Rahul Arnold APRN   50 mg at 04/05/23 2043   • mupirocin (BACTROBAN) 2 % nasal ointment 1 application  1 application Each Nare BID Rahul Arnold APRN   1 application at 04/05/23 2043   • nicotine (NICODERM CQ) 14 MG/24HR patch 1 patch  1 patch Transdermal Daily PRN Rahul Arnold APRN       • ondansetron (ZOFRAN) tablet 4 mg  4 mg Oral Q6H PRN Rahul Arnold APRN        Or   • ondansetron  (ZOFRAN) injection 4 mg  4 mg Intravenous Q6H PRN RustRahul APRN       • oxyCODONE-acetaminophen (PERCOCET)  MG per tablet 1 tablet  1 tablet Oral Q4H PRN Rahul Arnold APRN   1 tablet at 04/06/23 0155   • oxyCODONE-acetaminophen (PERCOCET) 7.5-325 MG per tablet 1 tablet  1 tablet Oral Q4H PRN Rahul Arnold APRN   1 tablet at 04/02/23 2000   • pantoprazole (PROTONIX) EC tablet 40 mg  40 mg Oral Q AM RustRahul APRN   40 mg at 04/06/23 0551   • Pharmacy to Dose enoxaparin (LOVENOX)   Does not apply Continuous PRN Rahul Arnold APRN       • polyethylene glycol (MIRALAX) packet 17 g  17 g Oral Daily FedericotRahul APRN   17 g at 04/05/23 0811   • sennosides-docusate (PERICOLACE) 8.6-50 MG per tablet 1 tablet  1 tablet Oral Daily FedericotRahul APRN   1 tablet at 04/05/23 0811   • sodium chloride 0.45 % 1,000 mL with potassium chloride 40 mEq infusion  250 mL/hr Intravenous Continuous PRN Rahul Arnold APRN       • sodium chloride 0.45 % infusion  250 mL/hr Intravenous Continuous PRN Rahul Arnold APRN       • sodium chloride 0.45 % with KCl 20 mEq/L infusion  250 mL/hr Intravenous Continuous PRN Rahul Arnold APRN   Stopped at 04/05/23 1758   • sodium chloride 0.9 % flush 10 mL  10 mL Intravenous Q12H Rahul Arnold APRN   10 mL at 04/05/23 2043   • sodium chloride 0.9 % flush 10 mL  10 mL Intravenous PRN Rahul Arnold APRN       • sodium chloride 0.9 % flush 10 mL  10 mL Intravenous Q12H RustRahul APRN   10 mL at 04/05/23 2043   • sodium chloride 0.9 % flush 10 mL  10 mL Intravenous PRN RustRahul APRN       • sodium chloride 0.9 % infusion 40 mL  40 mL Intravenous PRN RustRahul APRN       • sodium chloride 0.9 % infusion 40 mL  40 mL Intravenous PRN Rahul Arnold APRN       • sodium chloride 0.9 % infusion  250 mL/hr Intravenous Continuous PRN Rahul Arnold APRN       • sodium chloride 0.9 % with KCl 20 mEq/L infusion  250 mL/hr Intravenous Continuous PRN Rahul Arnold, APRN      "  • sodium chloride 0.9 % with KCl 40 mEq/L infusion  250 mL/hr Intravenous Continuous PRN Rahul Arnold, ADITYA           Vital Sign Min/Max for last 24 hours  Temp  Min: 97.7 °F (36.5 °C)  Max: 98.2 °F (36.8 °C)   BP  Min: 96/67  Max: 138/83   Pulse  Min: 63  Max: 123   Resp  Min: 10  Max: 23   SpO2  Min: 80 %  Max: 100 %   No data recorded   Weight  Min: 122 kg (269 lb 8 oz)  Max: 122 kg (269 lb 8 oz)     Flowsheet Rows    Flowsheet Row First Filed Value   Admission Height 177.8 cm (70\") Documented at 04/01/2023 1617   Admission Weight 117 kg (259 lb) Documented at 04/01/2023 1617          Results for orders placed during the hospital encounter of 03/31/23    Adult Transthoracic Echo Complete W/ Cont if Necessary Per Protocol    Interpretation Summary  •  Left ventricular systolic function is mildly decreased. Left ventricular ejection fraction appears to be 46 - 50%.  •  Left ventricular wall thickness is consistent with mild to moderate concentric hypertrophy.  •  The following left ventricular wall segments are hypokinetic: apical anterior, apical lateral, apical inferior, apical septal, apex hypokinetic and mid anteroseptal.  •  Left atrial volume is mildly increased.  •  Estimated right ventricular systolic pressure from tricuspid regurgitation is mildly elevated (35-45 mmHg).  •  Normal size and function the right ventricle.  •  No significant (greater than mild) valvular pathology.  •  Mild dilation of the aortic root is present.  •  Compared to prior exam from 6/19/2022, both studies were technically difficult, but upon my independent review of the previous exam, there did appear to be some mild apical hypokinesis present on that study that does look slightly more prominent on current exam.      Physical Exam:    General Appearance: Awake, alert, in no acute distress  Eyes: Pupils equal and reactive    Ears: Appear intact with no abnormalities noted  Nose: Nares normal, no drainage  Neck: supple, trachea " midline, no carotid bruit and no JVD  Back: no kyphosis present,    Lungs: respirations regular, respirations even and respirations unlabored  Heart: normal S1, S2, irregular, 2/6 systolic murmur left sternal border  No gallops or rubs  no rub and no click  Abdomen: normal bowel sounds, no tenderness   Skin: no bleeding, bruising or rash  Extremities: no cyanosis  Psychiatric/Behavioral: Negative for agitation, behavioral problems, confusion, the patient does  appear to be nervous/anxious.       Results Review:   I reviewed the patient's new clinical results.  I reviewed the patient's new imaging results and agree with the interpretation.  I reviewed the patient's other test results and agree with the interpretation  I personally viewed and interpreted the patient's EKG/Telemetry data    Discussed with patient  Updated patient regarding any new or relevant abnormalities on review of records or any new findings on physical exam.   Mentioned to patient about purpose of visit and desirable health short and long term goals and objectives.     Reviewed available prior notes, consults, prior visits, laboratory findings, radiology and cardiology relevant reports.   Updated chart as applicable.   I have reviewed the patient's medical history in detail and updated the computerized patient record as relevant.          Assessment & Plan       Swelling of scalp    Meningioma s/p craniotomy    Coronary artery disease    Type 2 diabetes mellitus with hyperglycemia and peripheral neuropathy, with long-term current use of insulin (HCC)    Paroxysmal atrial fibrillation with rapid ventricular response    Post-operative infection    Acute pulmonary edema due to A-fib RVR    Acute respiratory failure with hypoxia    Type 2 myocardial infarction due to arrhythmia    COPD (chronic obstructive pulmonary disease)    Obesity (BMI 30-39.9)    Tobacco abuse, in remission    Acute systolic heart failure    ADDIE (obstructive sleep  apnea)      Plan    Patient is once again asking about discharge  Will defer this to Dr. Portillo  Continue on current medical management  His ventricular rates are under good control now  No need for repeated troponins  Follow-up with cardiology within 2 weeks of discharge  Anticoagulation as deemed appropriate by neurosurgery  Hemodynamically stable  Continue on Cardizem CD, metoprolol as well as on digoxin  Continue on statin therapy  Follow-up with ADITYA Walton within 3 weeks of discharge  2-week outpatient cardiac monitor on discharge       Shubham Kc MD  04/06/23  08:18 CDT    EMR Dragon/Transcription was used to dictate part of this note M

## 2023-04-07 NOTE — PLAN OF CARE
Goal Outcome Evaluation:         Pt is A&O X4, and follows commands. Pt has been running Afib 's, and BP has been stable. Pt is on 3L NC while sleeping, pt can maintain on RA when he is not sleeping. Patient did have some pain and discomfort of his head. He did receive pain medication and patient stated that it helps.

## 2023-04-07 NOTE — CASE MANAGEMENT/SOCIAL WORK
Continued Stay Note  [facility]     Patient Name: Elijah Escobar  MRN: [id number]  Today's Date: 4/7/2023    Admit Date: 3/31/2023    Plan: LTAC precert pending   Discharge Plan     Row Name 04/07/23 1049       Plan    Plan LTAC precert pending    Plan Comments Insurance precert pending approval for LTAC placement.               Discharge Codes    No documentation.               Expected Discharge Date and Time     Expected Discharge Date Expected Discharge Time    Apr 3, 2023             TIANA Syed

## 2023-04-07 NOTE — PLAN OF CARE
Goal Outcome Evaluation:  Plan of Care Reviewed With: other (see comments)        Progress: no change  Outcome Evaluation: Ntn follow up. Oral intake 50% of one meal. Pt asleep at time of RDN visit today. Hyperglycemia with elevated HgbA1c 11.2% this admission. Will follow for education needs as appropriate. CCHO diet. Boost Glucose Control  BID. Con to follow for plan of care.

## 2023-04-07 NOTE — PLAN OF CARE
Goal Outcome Evaluation:  Plan of Care Reviewed With: patient        Progress: improving     Neuro intact. Afebrile. BM today. 's.

## 2023-04-07 NOTE — PLAN OF CARE
Goal Outcome Evaluation:  Plan of Care Reviewed With: patient           Outcome Evaluation: Pt in bed agreeable to tx. Performed AROM BLE 2x20 in supine. Rest breaks as needed. Worked on rolling to reposition in bed and scooting himself up in bed w/ use of bed rails. Will cont to work with pt in bed until hes able to wear his helmet for OOB activity.

## 2023-04-07 NOTE — THERAPY TREATMENT NOTE
Acute Care - Physical Therapy Treatment Note  [facility]     Patient Name: Elijah Escobar  : 1955  MRN: [id number]  Today's Date: 2023      Visit Dx:     ICD-10-CM ICD-9-CM   1. S/P craniotomy  Z98.890 V45.89   2. Impaired mobility  Z74.09 799.89   3. Infection of deep incisional surgical site after procedure, initial encounter  T81.42XA 998.59     Patient Active Problem List   Diagnosis   • Meningioma s/p craniotomy   • Hypertension   • GERD (gastroesophageal reflux disease)   • Coronary artery disease   • Class 2 severe obesity due to excess calories with serious comorbidity and body mass index (BMI) of 38.0 to 38.9 in adult   • Type 2 diabetes mellitus with hyperglycemia and peripheral neuropathy, with long-term current use of insulin (HCC)   • Leukocytosis   • Other specified anemias   • Paroxysmal atrial fibrillation with rapid ventricular response   • Post-operative infection   • Smoker   • History of cranioplasty   • Facial edema   • Swelling of scalp   • Acute pulmonary edema due to A-fib RVR   • Acute respiratory failure with hypoxia   • Type 2 myocardial infarction due to arrhythmia   • COPD (chronic obstructive pulmonary disease)   • Obesity (BMI 30-39.9)   • Tobacco abuse, in remission   • Acute systolic heart failure   • ADDIE (obstructive sleep apnea)     Past Medical History:   Diagnosis Date   • Brain tumor    • Coronary artery disease    • COVID-19 vaccine series completed     MODERNA X 3; LAST DOSE 3/2022   • Diabetes    • Erectile dysfunction    • GERD (gastroesophageal reflux disease)    • Hypercholesteremia    • Hypertension    • Seizure      Past Surgical History:   Procedure Laterality Date   • BALLOON ANGIOPLASTY, ARTERY Right    • COLONOSCOPY     • CRANIECTOMY Right 2022    Procedure: CRANIECTOMY WITH INCISIONAL WASHOUT;  Surgeon: Felipe Banerjee MD;  Location: [facility];  Service: Neurosurgery;  Laterality: Right;   • CRANIOPLASTY Right 10/4/2022    Procedure: CRANIOPLASTY  right with Lumbar drain;  Surgeon: Flaco Portillo MD;  Location:  [facility];  Service: Neurosurgery;  Laterality: Right;   • CRANIOPLASTY N/A 4/4/2023    Procedure: CRANIOPLASTY, removal, right, horseshoe;  Surgeon: Flaco Portillo MD;  Location:  [facility];  Service: Neurosurgery;  Laterality: N/A;   • CRANIOTOMY FOR TUMOR Right 6/16/2022    Procedure: CRANIOTOMY FOR TUMOR STERIOTACTIC WITH BRAIN LAB, right;  Surgeon: Flaco Portillo MD;  Location:  [facility];  Service: Neurosurgery;  Laterality: Right;     PT Assessment (last 12 hours)     PT Evaluation and Treatment     Row Name 04/07/23 0910 04/07/23 0822       Physical Therapy Time and Intention    Subjective Information -- no complaints  -TB    Document Type -- therapy note (daily note)  -TB    Mode of Treatment -- physical therapy  -TB    Comment, Session Not Performed --  -TB --    Row Name 04/07/23 0822          General Information    Existing Precautions/Restrictions fall;oxygen therapy device and L/min;other (see comments)  Helmet AAT  -TB     Row Name 04/07/23 0822          Bed Mobility    Bed Mobility rolling left;rolling right;scooting/bridging  -TB     Rolling Left [place] (Bed Mobility) modified independence  -TB     Rolling Right [place] (Bed Mobility) modified independence  -TB     Scooting/Bridging [place] (Bed Mobility) modified independence  -TB     Assistive Device (Bed Mobility) bed rails  -TB     Row Name 04/07/23 0822          Motor Skills    Therapeutic Exercise --  AROM BLE 2x20  -TB     Row Name             Wound 04/04/23 1111 Right temporal region Incision    Wound - Properties Group Placement Date: 04/04/23  -CHRISTIANO Placement Time: 1111  -CHRISTIANO Side: Right  -CHRISTIANO Location: temporal region  -CHRISTIANO Primary Wound Type: Incision  -CHRISTIANO    Retired Wound - Properties Group Placement Date: 04/04/23  -CHRISTIANO Placement Time: 1111  -CHRISTIANO Side: Right  -CHRISTIANO Location: temporal region  -CHRISTIANO Primary Wound Type: Incision  -CHRISTIANO    Retired  Wound - Properties Group Date first assessed: 04/04/23  -CHRISTIANO Time first assessed: 1111  -CHRISTIANO Side: Right  -CHRISTIANO Location: temporal region  -CHRISTIANO Primary Wound Type: Incision  -CHRISTIANO    Row Name 04/07/23 0822          Plan of Care Review    Plan of Care Reviewed With patient  -TB     Outcome Evaluation Pt in bed agreeable to tx. Performed AROM BLE 2x20 in supine. Rest breaks as needed. Worked on rolling to reposition in bed and scooting himself up in bed w/ use of bed rails. Will cont to work with pt in bed until hes able to wear his helmet for OOB activity.  -TB     Row Name 04/07/23 0822          Positioning and Restraints    Pre-Treatment Position in bed  -TB     Post Treatment Position bed  -TB     In Bed fowlers;call light within reach;encouraged to call for assist;side rails up x2;patient within staff view  -TB           User Key  (r) = Recorded By, (t) = Taken By, (c) = Cosigned By    Initials Name Provider Type    CHRISTIANO Ricky Sams, RN Registered Nurse    Tere Berger, PTA Physical Therapist Assistant                Physical Therapy Education     Title: PT OT SLP Therapies (Done)     Topic: Physical Therapy (Done)     Point: Mobility training (Done)     Learning Progress Summary           Patient Acceptance, E,TB, VU by CM at 4/6/2023 1350    Acceptance, E, VU by SB at 4/5/2023 1605    Comment: helmet fit, safety, POC    Acceptance, E, VU by CHRISTIANO at 4/3/2023 1353    Comment: gait, safety,    Acceptance, E, VU by SB at 4/1/2023 1405    Comment: pt edu on POC, benefits of act, PLB and d/c plans   Family Acceptance, E,TB, VU by CM at 4/6/2023 1350                   Point: Home exercise program (Done)     Learning Progress Summary           Patient Acceptance, E,TB, VU by CM at 4/6/2023 1350   Family Acceptance, E,TB, VU by CM at 4/6/2023 1350                   Point: Body mechanics (Done)     Learning Progress Summary           Patient Acceptance, E,TB, VU by CM at 4/6/2023 1350   Family Acceptance, E,TB, VU by  CM at 4/6/2023 1350                   Point: Precautions (Done)     Learning Progress Summary           Patient Acceptance, E,TB, VU by CM at 4/6/2023 1350    Acceptance, E, VU by SB at 4/5/2023 1605    Comment: helmet fit, safety, POC    Acceptance, E, VU by SB at 4/1/2023 1405    Comment: pt edu on POC, benefits of act, PLB and d/c plans   Family Acceptance, E,TB, VU by CM at 4/6/2023 1350                               User Key     Initials Effective Dates Name Provider Type Discipline    CHRISTIANO 02/03/23 -  Александр Saravia, PTA Physical Therapist Assistant PT    CM 03/02/23 -  Dianelys Carlton, RN Registered Nurse Nurse    SB 06/16/21 -  Erika Javier, BELEN DPT Physical Therapist PT              PT Recommendation and Plan     Plan of Care Reviewed With: patient  Outcome Evaluation: Pt in bed agreeable to tx. Performed AROM BLE 2x20 in supine. Rest breaks as needed. Worked on rolling to reposition in bed and scooting himself up in bed w/ use of bed rails. Will cont to work with pt in bed until hes able to wear his helmet for OOB activity.   Outcome Measures     Row Name 04/07/23 0822             How much help from another person do you currently need...    Turning from your back to your side while in flat bed without using bedrails? 4  -TB      Moving from lying on back to sitting on the side of a flat bed without bedrails? 4  -TB      Moving to and from a bed to a chair (including a wheelchair)? --  Defer right now  -TB      Standing up from a chair using your arms (e.g., wheelchair, bedside chair)? --  Defer right now  -TB      Climbing 3-5 steps with a railing? --  Defer right now  -TB      To walk in hospital room? --  Defer right now  -TB         Functional Assessment    Outcome Measure Options AM-PAC 6 Clicks Basic Mobility (PT)  -TB            User Key  (r) = Recorded By, (t) = Taken By, (c) = Cosigned By    Initials Name Provider Type    TB Tere Miller PTA Physical Therapist Assistant                  Time Calculation:    PT Charges     Row Name 04/07/23 1321             Time Calculation    Start Time 0822  -TB      Stop Time 0900  -TB      Time Calculation (min) 38 min  -TB      PT Received On 04/07/23  -TB         Time Calculation- PT    Total Timed Code Minutes- PT 38 minute(s)  -TB            User Key  (r) = Recorded By, (t) = Taken By, (c) = Cosigned By    Initials Name Provider Type    TB Tere Miller, [place]LAH Physical Therapist Assistant              Therapy Charges for Today     Code Description Service Date Service Provider Modifiers Qty    59651526687 HC PT THER PROC EA 15 MIN 4/7/2023 Tere Miller PTA GP 2    42928070068 HC PT THERAPEUTIC ACT EA 15 MIN 4/7/2023 Tere Miller, [place]LAH GP 1          PT G-Codes  Outcome Measure Options: AM-PAC 6 Clicks Basic Mobility (PT)  AM-PAC 6 Clicks Score (PT): 14  AM-PAC 6 Clicks Score (OT): 18    Tere Miller PTA  4/7/2023

## 2023-04-07 NOTE — PLAN OF CARE
Goal Outcome Evaluation:  Plan of Care Reviewed With: patient        Progress: no change  Outcome Evaluation: Pt drain & dressing intact & helmet does not fit. Assisted pt to EOB & completed BUE AROM ex.  Pt limited to bed tasks 2' to helmet not fitting.  Pt is very participatory, pleasant, & able to follow commands.  Cont OT tx

## 2023-04-07 NOTE — PROGRESS NOTES
[facility] Medicine Services  INPATIENT PROGRESS NOTE    Patient Name: Elijah Escobar  Date of Admission: 3/31/2023  Today's Date: 04/07/23  Length of Stay: 7  Primary Care Physician: Brianna Martinez APRN    Subjective   Chief Complaint: Consulted for medical management.   HPI   We transitioned back off of the insulin drip yesterday.  He did have some recurrent hyperglycemia with blood sugars into the 300s on several occasions.  The last 2 blood sugars have been 240, 250.  Hoping that recent increases of his basal insulin and going off of IV steroids will help him to have improved blood sugar over the next several days.  Will increase Levemir again today.    Renal function stable.    Remains in atrial fibrillation's with rates that are still 100s, 110s.  This is similar to yesterday and Dr. Kc made no adjustments to his medications.    Dr. Santos and I discussed the case yesterday.  He currently has the patient on meropenem rather than ceftriaxone.  He may consider transitioning to levofloxacin to complete his treatment duration.  He was concerned about eventual resistance to cephalosporins.  He stated that the patient might be better served to be in the LTAC to finish antibiotics in continue therapy.    The patient remains focused on discharge.    Review of Systems   All pertinent negatives and positives are as above. All other systems have been reviewed and are negative unless otherwise stated.     Objective    Temp:  [97.7 °F (36.5 °C)-98.7 °F (37.1 °C)] 97.9 °F (36.6 °C)  Heart Rate:  [] 105  Resp:  [14-21] 14  BP: (107-142)/(58-89) 141/78  Physical Exam  Constitutional:       Appearance: He is obese. He is ill-appearing (chronically).      Comments: Up in bed.  No distress.  No family present.  Discussed with his nurse, Joanne.   HENT:      Head: Normocephalic.      Comments: Bandaging around his head.   Eyes:      Conjunctiva/sclera: Conjunctivae normal.       Pupils: Pupils are equal, round, and reactive to light.   Neck:      Vascular: No JVD.   Cardiovascular:      Rate and Rhythm: Tachycardia present. Rhythm irregular.      Heart sounds: Normal heart sounds.      Comments: In atrial fibrillation with rates in the 100s, 110s.   Pulmonary:      Effort: Pulmonary effort is normal. No respiratory distress.      Breath sounds: No wheezing or rales.      Comments: On 2L.   Abdominal:      General: Bowel sounds are normal. There is no distension.      Palpations: Abdomen is soft.      Tenderness: There is no abdominal tenderness.   Musculoskeletal:         General: Swelling (trace) present. No tenderness or deformity. Normal range of motion.   Skin:     General: Skin is warm and dry.      Findings: No rash.   Neurological:      Mental Status: He is alert and oriented to person, place, and time.      Cranial Nerves: No cranial nerve deficit.      Motor: No abnormal muscle tone.      Deep Tendon Reflexes: Reflexes normal.   Psychiatric:         Mood and Affect: Mood normal.         Behavior: Behavior normal.         Thought Content: Thought content normal.         Judgment: Judgment normal.         Intake/Output Summary (Last 24 hours) at 4/7/2023 0806  Last data filed at 4/7/2023 0350  Gross per 24 hour   Intake 740 ml   Output 1280 ml   Net -540 ml     Results Review:  I have reviewed the labs, radiology results, and diagnostic studies.    Laboratory Data:   Results from last 7 days   Lab Units 04/07/23  0249 04/06/23  0352 04/04/23  0805   WBC 10*3/mm3 17.47* 13.39* 14.40*   HEMOGLOBIN g/dL 11.0* 10.7* 11.6*   HEMATOCRIT % 34.5* 31.6* 34.8*   PLATELETS 10*3/mm3 246 220 234     Results from last 7 days   Lab Units 04/07/23  0249 04/06/23  0831 04/06/23  0352 04/05/23  0304 04/04/23  0745 04/03/23  0436 04/01/23  0559 04/01/23  0435   SODIUM mmol/L 137 137 136   < > 136 139   < > 137   POTASSIUM mmol/L 4.0 3.4* 3.5   < > 3.3* 3.6   < > 3.9   CHLORIDE mmol/L 102 102 102   < >  98 102   < > 99   CO2 mmol/L 23.0 25.0 23.0   < > 21.0* 23.0   < > 16.0*   BUN mg/dL 41* 40* 43*   < > 42* 36*   < > 34*   CREATININE mg/dL 1.14 1.28* 1.19   < > 1.36* 1.16   < > 1.20   CALCIUM mg/dL 8.1* 7.9* 7.9*   < > 8.3* 8.6   < > 8.7   BILIRUBIN mg/dL  --   --   --   --  0.5 0.7  --  1.2   ALK PHOS U/L  --   --   --   --  85 89  --  111   ALT (SGPT) U/L  --   --   --   --  10 11  --  11   AST (SGOT) U/L  --   --   --   --  13 22  --  28   GLUCOSE mg/dL 240* 178* 159*   < > 343* 102*   < > 261*    < > = values in this interval not displayed.     I have reviewed the patient's current medications.     Assessment/Plan   Assessment  Active Hospital Problems    Diagnosis    • **Swelling of scalp    • Post-operative infection    • Meningioma s/p craniotomy    • Type 2 diabetes mellitus with hyperglycemia and peripheral neuropathy, with long-term current use of insulin (HCC)    • Acute systolic heart failure    • Type 2 myocardial infarction due to arrhythmia    • Acute pulmonary edema due to A-fib RVR    • Paroxysmal atrial fibrillation with rapid ventricular response    • Coronary artery disease    • COPD (chronic obstructive pulmonary disease)    • Obesity (BMI 30-39.9)    • Tobacco abuse, in remission    • ADDIE (obstructive sleep apnea)    • Acute respiratory failure with hypoxia      Treatment Plan  The patient was admitted on 3/31 by Dr. Portillo with the neurosurgical service.  He has a history of craniotomy in June 2022 that became infected and required washout in July 2022.  He then required a craniectomy in October 2022.  He presented and had a right frontal scalp fluid collection.  Spinal fluid was clean.  Surgical intervention was planned, but the patient had other issues going on including uncontrolled hyperglycemia and atrial fibrillation with rapid ventricular response.  Hospital medicine was consulted to assist with the above.    The scalp was also aspirated and he ultimately grew Serratia.  This was  resistant to tetracycline.  He has been receiving vancomycin and Zosyn.  I de-escalated his antibiotic coverage to ceftriaxone monotherapy on 4/4. He underwent I&D on 4/4 with Dr. Portillo. Dr. Santos with ID is now seeing and has placed him on Merrem for now. Case discussed with him yesterday.     He has been seen by cardiology.  He has had some difficulties with paroxysmal atrial fibrillation with rapid ventricular response.  He is on therapeutic Lovenox and aspirin.  Efforts have been made at rate control with Cardizem CD and digoxin. Echocardiogram showed an EF of 46-50%.  He also had some hypokinesis of some segments.  Study was also technically difficult.  Currently on oral Lasix.  Not presently on ACE/ARB.  Not presently on Toprol-XL/carvedilol, but metoprolol tartrate.    He has been seen by pulmonology.  His pulmonary infiltrates are likely more secondary to CHF than pneumonia.  He has been on antibiotics to cover his scalp infection.  Pulmonology also had him on IV steroids recently (which exacerbated his blood sugars).  Continue efforts at pulmonary toilet.  Wean oxygen as tolerated. Back on 2L after surgery.     He has a history of diabetes.  His hemoglobin A1c was 11.0 in October 2022 and is 11.0 now. He is on Levemir in the home setting.  He at one point was on an insulin drip and then placed on one again by NS on 4/5.  Steroids have finally been stopped as of 4/5.  Transition back to basal bolus insulin on 4/6.  Most recent glucoses 240, 250.      DVT prophylaxis is achieved by being on therapeutic Lovenox.    Medical Decision Making  Number and Complexity of problems: The main issue requiring hospitalization was 1 acute, moderately complex problem in the form of his scalp infection where he has previously had craniotomy to remove any meningioma.  Differential Diagnosis: Superficial versus deeper infection.    Conditions and Status        Condition is improving.     [facility] Data  External documents  reviewed: Reviewed prior Epic records.  Cardiac tracing (EKG, telemetry) interpretation: Continues in atrial fibrillation on telemetry at present.  Radiology interpretation: No recent studies.  Reviewed admission studies.  Labs reviewed: As above.  Any tests that were considered but not ordered: None considered at present.     Decision rules/scores evaluated (example [id number], Wells, etc): [id number] score is 4.     Discussed with: The patient and his nurse, Joanne.     Care Planning  Shared decision making: Consented to admission for further treatment.  Code status and discussions: Full with full interventions.    Disposition  Social Determinants of Health that impact treatment or disposition: Nothing negative identified at present.  I expect the patient to be discharged to per the neurosurgical service.  Possible LTAC?    Electronically signed by Joey Moore DO, 04/07/23, 08:06 CDT.

## 2023-04-07 NOTE — PROGRESS NOTES
Neurosurgery Daily Progress Note    HPI:  Elijah Escobar is a 67 y.o. male with a significant medical history of hypertension, diabetes, hyperlipidemia, seizures, craniotomy for tumor (6/16/2022), craniotomy for washout (7/1/2022), craniectomy (10/4/2022), coronary artery disease, diabetes, erectile dysfunction, GERD, hypertension, hyperlipidemia, tobacco abuse, and obesity.  He presents today with a complaint of a 4-5 days onset of progressively worsening right frontal, temporal, and periorbital edema and associated symptoms to include, but not limited to generalized fatigue, chills, dyspnea, intermittent nausea without vomiting, and states he's felt feverish.  Physical exam findings of neurologically intact with right frontal, temporal, and periorbital swelling.  WBC elevated at 15.  3 to an CRP elevated at 14.75.  Imaging pending.    Assessment:   Past Medical History:   Diagnosis Date   • Brain tumor    • Coronary artery disease    • COVID-19 vaccine series completed     MODERNA X 3; LAST DOSE 3/2022   • Diabetes    • Erectile dysfunction    • GERD (gastroesophageal reflux disease)    • Hypercholesteremia    • Hypertension    • Seizure      Active Hospital Problems    Diagnosis    • **Swelling of scalp    • COPD (chronic obstructive pulmonary disease)    • Obesity (BMI 30-39.9)    • Tobacco abuse, in remission    • Acute systolic heart failure    • ADDIE (obstructive sleep apnea)    • Type 2 myocardial infarction due to arrhythmia    • Acute pulmonary edema due to A-fib RVR    • Acute respiratory failure with hypoxia    • Post-operative infection    • Paroxysmal atrial fibrillation with rapid ventricular response    • Type 2 diabetes mellitus with hyperglycemia and peripheral neuropathy, with long-term current use of insulin (HCC)    • Coronary artery disease    • Meningioma s/p craniotomy      Plan:   Neuro: Stable, intact/at baseline  Infected cranial flap      3 Days Post-Op (4/4/2023)  craniectomy and  "washout   Postop CT stable. No acute abnormalities.   Flap tapped I&D cultures 4+ gram-negative bacilli    Heavy 4+ Serratia marcescens   ID consulted.  Appreciate assistance.  JUAN = 30 mL, keep  Remain in ICU for close neurologic monitoring  Continue neuro exams per policy.  Call for decline    CV: AFib with RVR and heart strain and trop bump.  Stabilized    Appreciate cardiology  Pulm: Possible PNA.  Resolved.  Chest x-ray clear  :  No patel.  UTI.  Resolved  FEN: Reg diet for now   Fluid balance for sepsis vs pulmonary edema   Carb consistent diet  Endocrine: Glucose improving.  Continue Glucomander protocol   GI: SHERIF  ID: WBC up 17.5, CRP and ESR elevated   Infected cranial flap  CSF: no growth to date   PNA.  Resolved   UTI.  Resolved   Continue meropenem per ID.  Appreciate assist  Heme:  DVT prophylaxis  Pain: Tolerable at present  Dispo: PT/OT    DC pending acceptance to LTAC.  Patient agreeable     Chief complaint:   4-5 days onset of progressively worsening right frontal, temporal, and periorbital edema and associated symptoms to include, but not limited to generalized fatigue, chills, dyspnea, intermittent nausea without vomiting, and states he's felt feverish.    HPI  Subjective  Doing well    Temp:  [97.7 °F (36.5 °C)-98.7 °F (37.1 °C)] 97.9 °F (36.6 °C)  Heart Rate:  [] 105  Resp:  [14-21] 14  BP: (107-142)/(58-89) 141/78    Output by Drain (mL) 04/06/23 0701 - 04/06/23 1900 04/06/23 1901 - 04/07/23 0700 04/07/23 0701 - 04/07/23 0856 Range Total   Closed/Suction Drain 1 Right Scalp Bulb 7 Fr. 10 20  30     Objective:  Vital signs: (most recent): Blood pressure 141/78, pulse 105, temperature 97.9 °F (36.6 °C), temperature source Oral, resp. rate 14, height 177.8 cm (70\"), weight 121 kg (267 lb 9.6 oz), SpO2 96 %.      Neurologic Exam     Mental Status   Oriented to person, place, and time.   Speech: speech is normal   Level of consciousness: alert    Oriented x3.  Follows commands without " prompting showing thumbs up and 2 fingers bilaterally.     Cranial Nerves     CN III, IV, VI   Pupils are equal, round, and reactive to light.  Extraocular motions are normal.     CN V   Facial sensation intact.     CN VII   Facial expression full, symmetric.     Motor Exam   Right arm pronator drift: absent  Left arm pronator drift: absent    Strength   Right deltoid: 5/5  Left deltoid: 5/5  Right biceps: 5/5  Left biceps: 5/5  Right triceps: 5/5  Left triceps: 5/5  Right iliopsoas: 5/5  Left iliopsoas: 5/5  Right quadriceps: 5/5  Left quadriceps: 5/5  Right gastroc: 5/5  Left gastroc: 5/5  Moves all extremities equal and symmetric     Sensory Exam   Light touch normal.     Drains: * No LDAs found *    Imaging Results (Last 24 Hours)     ** No results found for the last 24 hours. **        Lab Results (last 24 hours)     Procedure Component Value Units Date/Time    POC Glucose Once [[id number]]  (Abnormal) Collected: 04/07/23 0721    Specimen: Blood Updated: 04/07/23 0732     Glucose 250 mg/dL      Comment: : [id number] Hughes AllysonMeter ID: [id number]       Basic Metabolic Panel [[id number]]  (Abnormal) Collected: 04/07/23 0249    Specimen: Blood Updated: 04/07/23 0325     Glucose 240 mg/dL      BUN 41 mg/dL      Creatinine 1.14 mg/dL      Sodium 137 mmol/L      Potassium 4.0 mmol/L      Chloride 102 mmol/L      CO2 23.0 mmol/L      Calcium 8.1 mg/dL      BUN/Creatinine Ratio 36.0     Anion Gap 12.0 mmol/L      eGFR 70.5 mL/min/1.73     Narrative:      GFR Normal >60  Chronic Kidney Disease <60  Kidney Failure <15      CBC (No Diff) [[id number]]  (Abnormal) Collected: 04/07/23 0249    Specimen: Blood Updated: 04/07/23 0312     WBC 17.47 10*3/mm3      RBC 3.71 10*6/mm3      Hemoglobin 11.0 g/dL      Hematocrit 34.5 %      MCV 93.0 fL      MCH 29.6 pg      MCHC 31.9 g/dL      RDW 12.2 %      RDW-SD 41.0 fl      MPV 11.0 fL      Platelets 246 10*3/mm3     POC Glucose Once [[id number]]  (Abnormal) Collected: 04/06/23  1929    Specimen: Blood Updated: 04/06/23 1941     Glucose 361 mg/dL      Comment: : [id number] Francisco ByrneidaMeter ID: [id number]       POC Glucose Once [[id number]]  (Abnormal) Collected: 04/06/23 1704    Specimen: Blood Updated: 04/06/23 1715     Glucose 373 mg/dL      Comment: : [id number] Leah Vega ID: [id number]       POC Glucose Once [[id number]]  (Abnormal) Collected: 04/06/23 1156    Specimen: Blood Updated: 04/06/23 1208     Glucose 315 mg/dL      Comment: : [id number] Leah Vega ID: [id number]       POC Glucose Once [[id number]]  (Abnormal) Collected: 04/06/23 1006    Specimen: Blood Updated: 04/06/23 1018     Glucose 196 mg/dL      Comment: : [id number] Leah Vega ID: [id number]       POC Glucose Once [[id number]]  (Abnormal) Collected: 04/06/23 0905    Specimen: Blood Updated: 04/06/23 0916     Glucose 206 mg/dL      Comment: : [id number] Leah Vega ID: [id number]           ADITYA Cespedes

## 2023-04-07 NOTE — PROGRESS NOTES
Infectious Diseases Progress Note    Patient:  Elijah Escobar  YOB: 1955  MRN: [id number]   Admit date: 3/31/2023   Admitting Physician: Flaco Portillo, *  Primary Care Physician: Brianna Martinez APRN    Chief Complaint/Interval History: He feels okay.  No new symptoms.  Resting comfortably at present.  No headache.  No neurological complaints.  Discussed with nursing.   evaluating for LTAC/skilled nursing facility.  Patient was seen in the morning of April 7, 2023.  Note initiated at that time.  Note completed as a late entry the morning of April 8, 2023.  Remains in ICU.  Discussed with nursing.  Discussed with pharmacy.  Discussed with neurosurgery.    Intake/Output Summary (Last 24 hours) at 4/7/2023 1034  Last data filed at 4/7/2023 1024  Gross per 24 hour   Intake 1080 ml   Output 1880 ml   Net -800 ml     Allergies: No Known Allergies  Current Scheduled Medications:   aspirin, 81 mg, Oral, Daily  atorvastatin, 20 mg, Oral, Nightly  budesonide-formoterol, 2 puff, Inhalation, BID - RT  digoxin, 125 mcg, Oral, Daily  dilTIAZem CD, 240 mg, Oral, Q24H  enoxaparin, 1 mg/kg, Subcutaneous, Q12H  furosemide, 40 mg, Oral, Daily  gabapentin, 300 mg, Oral, TID  insulin detemir, 30 Units, Subcutaneous, Q12H  insulin lispro, 0-14 Units, Subcutaneous, TID AC  ipratropium, 0.5 mg, Nebulization, 4x Daily - RT  levETIRAcetam, 500 mg, Oral, BID  meropenem, 1 g, Intravenous, Q8H  methylnaltrexone, 12 mg, Subcutaneous, Every Other Day  metoprolol tartrate, 50 mg, Oral, Q12H  pantoprazole, 40 mg, Oral, Q AM  polyethylene glycol, 17 g, Oral, Daily  potassium chloride, 20 mEq, Oral, Daily  sennosides-docusate, 1 tablet, Oral, Daily  sodium chloride, 10 mL, Intravenous, Q12H      Current PRN Medications:  •  acetaminophen **OR** acetaminophen **OR** acetaminophen  •  butalbital-acetaminophen-caffeine  •  dextrose  •  dextrose  •  glucagon (human recombinant)  •  nicotine  •  ondansetron  "**OR** ondansetron  •  oxyCODONE-acetaminophen  •  oxyCODONE-acetaminophen  •  Pharmacy to Dose enoxaparin (LOVENOX)  •  sodium chloride  •  sodium chloride    Review of Systems see HPI    Vital Signs:  /90   Pulse 90   Temp 98 °F (36.7 °C) (Axillary)   Resp 14   Ht 177.8 cm (70\")   Wt 121 kg (267 lb 9.6 oz)   SpO2 95%   BMI 38.40 kg/m²     Physical Exam  Vital signs - reviewed.  Line/IV site - No erythema, warmth, induration, or tenderness.  Drain remains in place.  Fairly minimal serosanguineous output.  Nonfocal neurological examination.  Skin without rash  Abdomen is soft and nontender    Lab Results:  CBC:   Results from last 7 days   Lab Units 04/07/23  0249 04/06/23  0352 04/04/23  0805 04/03/23  0436 04/02/23  0613 04/01/23  0435   WBC 10*3/mm3 17.47* 13.39* 14.40* 11.00* 17.92* 19.05*   HEMOGLOBIN g/dL 11.0* 10.7* 11.6* 11.0* 11.9* 13.3   HEMATOCRIT % 34.5* 31.6* 34.8* 34.5* 36.6* 42.2   PLATELETS 10*3/mm3 246 220 234 188 192 224     BMP:  Results from last 7 days   Lab Units 04/07/23  0249 04/06/23  0831 04/06/23  0352 04/05/23  1939 04/05/23  1519 04/05/23  1207 04/05/23  0304 04/04/23  0745 04/03/23  0436 04/01/23  0559 04/01/23  0435   SODIUM mmol/L 137 137 136 135* 134* 132* 134* 136 139   < > 137   POTASSIUM mmol/L 4.0 3.4* 3.5 3.5 3.6 3.5 3.7 3.3* 3.6   < > 3.9   CHLORIDE mmol/L 102 102 102 99 97* 96* 97* 98 102   < > 99   CO2 mmol/L 23.0 25.0 23.0 23.0 23.0 24.0 20.0* 21.0* 23.0   < > 16.0*   BUN mg/dL 41* 40* 43* 46* 45* 47* 46* 42* 36*   < > 34*   CREATININE mg/dL 1.14 1.28* 1.19 1.50* 1.49* 1.46* 1.42* 1.36* 1.16   < > 1.20   GLUCOSE mg/dL 240* 178* 159* 205* 331* 436* 408* 343* 102*   < > 261*   CALCIUM mg/dL 8.1* 7.9* 7.9* 8.0* 8.0* 7.9* 8.0* 8.3* 8.6   < > 8.7   ALT (SGPT) U/L  --   --   --   --   --   --   --  10 11  --  11    < > = values in this interval not displayed.     Culture Results:   Blood Culture   Date Value Ref Range Status   03/31/2023 No growth at 5 days  Final "   03/31/2023 No growth at 5 days  Final     Urine Culture   Date Value Ref Range Status   03/31/2023 No growth  Final     Wound Culture   Date Value Ref Range Status   04/01/2023 Heavy growth (4+) Serratia marcescens (A)  Final     Respiratory Culture   Date Value Ref Range Status   04/03/2023 Rejected  Final       Susceptibility     Serratia marcescens     STALIN     Cefepime <=1 ug/ml Susceptible     Ceftazidime <=1 ug/ml Susceptible     Ceftriaxone <=1 ug/ml Susceptible     Ertapenem <=0.5 ug/ml Susceptible (C) 1     Gentamicin <=1 ug/ml Susceptible     Levofloxacin <=0.12 ug/ml Susceptible     Meropenem <=0.25 ug/ml Susceptible (C) 1     Piperacillin + Tazobactam  Susceptible 2     Tetracycline >=16 ug/ml Resistant     Trimethoprim + Sulfamethoxazole <=20 ug/ml Susceptible        Radiology: None  Additional Studies Reviewed: None    Impression:   Infection right frontal cranioplasty.  Stable on treatment with meropenem.  Using meropenem at present until organism burden further diminished due to risk of chromosomal mediated beta-lactamase which can be expressed with cephalosporin exposure.  Do not feel he can manage IV antibiotic treatment at home.  Nursing indicated he may not have running water at present.  Feel his best opportunity for improvement is with IV antibiotic initially to insure adequate delivery and adherence and then transition to oral therapy.    Recommendations:   Continue treatment with meropenem  Planning IV antibiotic treatment through April 20  If ongoing improvement possible transition to oral levofloxacin or Bactrim sooner  Hopefully he will qualify for LTAC or skilled nursing facility for IV antibiotic treatment, physical therapy, and any wound care that might be necessary    Edwin Jeffers MD

## 2023-04-08 NOTE — PROGRESS NOTES
[facility] Medicine Services  INPATIENT PROGRESS NOTE    Patient Name: Elijah Escobar  Date of Admission: 3/31/2023  Today's Date: 04/08/23  Length of Stay: 8  Primary Care Physician: Brianna Martinez APRN    Subjective   Chief Complaint: Consulted for medical management.   HPI   Blood sugar was 85 this morning.  Feel that we should go ahead and give his basal insulin.  He needs very tight control of his blood sugars with his infection being treated.    Atrial fibrillation under slightly better rate control.    Remains focused on discharge.  Considering the LTAC.    Review of Systems   All pertinent negatives and positives are as above. All other systems have been reviewed and are negative unless otherwise stated.     Objective    Temp:  [97.8 °F (36.6 °C)-98.2 °F (36.8 °C)] 98.1 °F (36.7 °C)  Heart Rate:  [] 110  Resp:  [10-19] 12  BP: (106-148)/(65-95) 125/95  Physical Exam  Constitutional:       Appearance: He is obese. He is ill-appearing (chronically).      Comments: Up in bed.  No distress.  No family present.  Discussed with his nurse, Oh GALLARDOT:      Head: Normocephalic.      Comments: Bandaging around his head.   Eyes:      Conjunctiva/sclera: Conjunctivae normal.      Pupils: Pupils are equal, round, and reactive to light.   Neck:      Vascular: No JVD.   Cardiovascular:      Rate and Rhythm: Tachycardia present. Rhythm irregular.      Heart sounds: Normal heart sounds.      Comments: In atrial fibrillation with rates in the 90s, 100s.   Pulmonary:      Effort: Pulmonary effort is normal. No respiratory distress.      Breath sounds: No wheezing or rales.      Comments: On 2L.   Abdominal:      General: Bowel sounds are normal. There is no distension.      Palpations: Abdomen is soft.      Tenderness: There is no abdominal tenderness.   Musculoskeletal:         General: Swelling (trace) present. No tenderness or deformity. Normal range of motion.   Skin:      General: Skin is warm and dry.      Findings: No rash.   Neurological:      Mental Status: He is alert and oriented to person, place, and time.      Cranial Nerves: No cranial nerve deficit.      Motor: No abnormal muscle tone.      Deep Tendon Reflexes: Reflexes normal.   Psychiatric:         Mood and Affect: Mood normal.         Behavior: Behavior normal.         Thought Content: Thought content normal.         Judgment: Judgment normal.         Intake/Output Summary (Last 24 hours) at 4/8/2023 0845  Last data filed at 4/8/2023 0800  Gross per 24 hour   Intake 780 ml   Output 2720 ml   Net -1940 ml     Results Review:  I have reviewed the labs, radiology results, and diagnostic studies.    Laboratory Data:   No new labs today.     I have reviewed the patient's current medications.     Assessment/Plan   Assessment  Active Hospital Problems    Diagnosis    • **Swelling of scalp    • Post-operative infection    • Meningioma s/p craniotomy    • Type 2 diabetes mellitus with hyperglycemia and peripheral neuropathy, with long-term current use of insulin (HCC)    • Acute systolic heart failure    • Type 2 myocardial infarction due to arrhythmia    • Acute pulmonary edema due to A-fib RVR    • Paroxysmal atrial fibrillation with rapid ventricular response    • Coronary artery disease    • COPD (chronic obstructive pulmonary disease)    • Obesity (BMI 30-39.9)    • Tobacco abuse, in remission    • ADDIE (obstructive sleep apnea)    • Acute respiratory failure with hypoxia      Treatment Plan  The patient was admitted on 3/31 by Dr. Portillo with the neurosurgical service.  He has a history of craniotomy in June 2022 that became infected and required washout in July 2022.  He then required a craniectomy in October 2022.  He presented and had a right frontal scalp fluid collection.  Spinal fluid was clean.  Surgical intervention was planned, but the patient had other issues going on including uncontrolled hyperglycemia and  atrial fibrillation with rapid ventricular response.  Hospital medicine was consulted to assist with the above.    The scalp was also aspirated and he ultimately grew Serratia.  This was resistant to tetracycline.  He has been receiving vancomycin and Zosyn.  I de-escalated his antibiotic coverage to ceftriaxone monotherapy on 4/4. He underwent I&D on 4/4 with Dr. Portillo. Dr. Santos with ID is now seeing and has placed him on Merrem for now. Case discussed with him yesterday.     He has been seen by cardiology.  He has had some difficulties with paroxysmal atrial fibrillation with rapid ventricular response.  He is on therapeutic Lovenox and aspirin.  Efforts have been made at rate control with Cardizem CD and digoxin. Echocardiogram showed an EF of 46-50%.  He also had some hypokinesis of some segments.  Study was also technically difficult.  Currently on oral Lasix.  Not presently on ACE/ARB.  Not presently on Toprol-XL/carvedilol, but metoprolol tartrate.    He has been seen by pulmonology.  His pulmonary infiltrates are likely more secondary to CHF than pneumonia.  He has been on antibiotics to cover his scalp infection.  Pulmonology also had him on IV steroids recently (which exacerbated his blood sugars).  Continue efforts at pulmonary toilet.  Wean oxygen as tolerated. Back on 2L after surgery.     He has a history of diabetes.  His hemoglobin A1c was 11.0 in October 2022 and is 11.0 now. He is on Levemir in the home setting.  He at one point was on an insulin drip and then placed on one again by NS on 4/5.  Steroids have finally been stopped as of 4/5.  Transition back to basal bolus insulin on 4/6.  Most recent glucoses 312, 210, 85.      DVT prophylaxis is achieved by being on therapeutic Lovenox.    Medical Decision Making  Number and Complexity of problems: The main issue requiring hospitalization was 1 acute, moderately complex problem in the form of his scalp infection where he has previously had  craniotomy to remove any meningioma.  Differential Diagnosis: Superficial versus deeper infection.    Conditions and Status        Condition is improving.     [facility] Data  External documents reviewed: Reviewed prior Epic records.  Cardiac tracing (EKG, telemetry) interpretation: Continues in atrial fibrillation on telemetry at present.  Radiology interpretation: No recent studies.  Reviewed admission studies.  Labs reviewed: As above.  Any tests that were considered but not ordered: None considered at present.     Decision rules/scores evaluated (example [id number], Wells, etc): [id number] score is 4.     Discussed with: The patient and his nurse, Yaniv. Discussed with ADITYA Gallego with neurosurgery yesterday.      Care Planning  Shared decision making: Consented to admission for further treatment.  Code status and discussions: Full with full interventions.    Disposition  Social Determinants of Health that impact treatment or disposition: Nothing negative identified at present.  I expect the patient to be discharged to per the neurosurgical service.  Possible LTAC?    Electronically signed by Joey Moore DO, 04/08/23, 08:45 CDT.

## 2023-04-08 NOTE — PLAN OF CARE
Goal Outcome Evaluation:           Progress: improving  Outcome Evaluation: Pt remains A&Ox4 and following commands. Still on 2 L NC. JUAN had out 20 mL overnight, dressing is still clean, dry, intact. Pt received one dose of pain medicine last night. Voiding per urinal and urine output has been adequate.

## 2023-04-08 NOTE — THERAPY DISCHARGE NOTE
Acute Care - Occupational Therapy Discharge  [facility]    Patient Name: Elijah Escobar  : 1955    MRN: [id number]                              Today's Date: 2023       Admit Date: 3/31/2023    Visit Dx:     ICD-10-CM ICD-9-CM   1. S/P craniotomy  Z98.890 V45.89   2. Impaired mobility  Z74.09 799.89   3. Infection of deep incisional surgical site after procedure, initial encounter  T81.42XA 998.59     Patient Active Problem List   Diagnosis   • Meningioma s/p craniotomy   • Hypertension   • GERD (gastroesophageal reflux disease)   • Coronary artery disease   • Class 2 severe obesity due to excess calories with serious comorbidity and body mass index (BMI) of 38.0 to 38.9 in adult   • Type 2 diabetes mellitus with hyperglycemia and peripheral neuropathy, with long-term current use of insulin (HCC)   • Leukocytosis   • Other specified anemias   • Paroxysmal atrial fibrillation with rapid ventricular response   • Post-operative infection   • Smoker   • History of cranioplasty   • Facial edema   • Swelling of scalp   • Acute pulmonary edema due to A-fib RVR   • Acute respiratory failure with hypoxia   • Type 2 myocardial infarction due to arrhythmia   • COPD (chronic obstructive pulmonary disease)   • Obesity (BMI 30-39.9)   • Tobacco abuse, in remission   • Acute systolic heart failure   • ADDIE (obstructive sleep apnea)     Past Medical History:   Diagnosis Date   • Brain tumor    • Coronary artery disease    • COVID-19 vaccine series completed     MODERNA X 3; LAST DOSE 3/2022   • Diabetes    • Erectile dysfunction    • GERD (gastroesophageal reflux disease)    • Hypercholesteremia    • Hypertension    • Seizure      Past Surgical History:   Procedure Laterality Date   • BALLOON ANGIOPLASTY, ARTERY Right    • COLONOSCOPY     • CRANIECTOMY Right 2022    Procedure: CRANIECTOMY WITH INCISIONAL WASHOUT;  Surgeon: Felipe Banerjee MD;  Location: [facility];  Service: Neurosurgery;  Laterality: Right;   •  CRANIOPLASTY Right 10/4/2022    Procedure: CRANIOPLASTY right with Lumbar drain;  Surgeon: Flaco Portillo MD;  Location:  [facility];  Service: Neurosurgery;  Laterality: Right;   • CRANIOPLASTY N/A 4/4/2023    Procedure: CRANIOPLASTY, removal, right, horseshoe;  Surgeon: Flaco Portillo MD;  Location:  [facility];  Service: Neurosurgery;  Laterality: N/A;   • CRANIOTOMY FOR TUMOR Right 6/16/2022    Procedure: CRANIOTOMY FOR TUMOR STERIOTACTIC WITH BRAIN LAB, right;  Surgeon: Flaco Portillo MD;  Location:  [facility];  Service: Neurosurgery;  Laterality: Right;      General Information     Row Name 04/08/23 0850          OT Time and Intention    Document Type therapy note (daily note)  -     Mode of Treatment occupational therapy  -     Row Name 04/08/23 0850          General Information    Patient Profile Reviewed yes  -     Existing Precautions/Restrictions fall;oxygen therapy device and L/min;other (see comments)  -     Row Name 04/08/23 0850          Cognition    Orientation Status (Cognition) oriented x 4  -     Row Name 04/08/23 0850          Safety Issues, Functional Mobility    Impairments Affecting Function (Mobility) endurance/activity tolerance;shortness of breath  -           User Key  (r) = Recorded By, (t) = Taken By, (c) = Cosigned By    Initials Name Provider Type    Coleen Hodges, ERONR/L, CSRS Occupational Therapist               Mobility/ADL's     Row Name 04/08/23 0850          Bed Mobility    Supine-Sit [place] (Bed Mobility) supervision  -     Assistive Device (Bed Mobility) bed rails;head of bed elevated  -     Row Name 04/08/23 0850          Transfers    Transfers sit-stand transfer;stand-sit transfer;bed-chair transfer  -     Row Name 04/08/23 0850          Bed-Chair Transfer    Bed-Chair [place] (Transfers) verbal cues;contact guard  -     Row Name 04/08/23 0850          Sit-Stand Transfer    Sit-Stand [place] (Transfers) verbal  cues;contact guard  -JJ     Row Name 04/08/23 0850          Stand-Sit Transfer    Stand-Sit [place] (Transfers) verbal cues;contact guard  -JJ     Row Name 04/08/23 0850          Activities of Daily Living    BADL Assessment/Intervention lower body dressing  -JJ     Row Name 04/08/23 0850          Lower Body Dressing Assessment/Training    [place] Level (Lower Body Dressing) don;socks;standby assist  -JJ     Position (Lower Body Dressing) unsupported sitting  -JJ           User Key  (r) = Recorded By, (t) = Taken By, (c) = Cosigned By    Initials Name Provider Type    JColeen Hodges, OTR/L, CSRS Occupational Therapist               Obj/Interventions    No documentation.                Goals/Plan     Row Name 04/08/23 0850          Transfer Goal 1 (OT)    Activity/Assistive Device (Transfer Goal 1, OT) toilet;bed-to-chair/chair-to-bed  -JJ     [place] Level/Cues Needed (Transfer Goal 1, OT) modified independence  -JJ     Time Frame (Transfer Goal 1, OT) long term goal (LTG)  -JJ     Progress/Outcome (Transfer Goal 1, OT) goal met  -JJ     Row Name 04/08/23 0850          Bathing Goal 1 (OT)    Activity/Device (Bathing Goal 1, OT) bathing skills, all  -JJ     [place] Level/Cues Needed (Bathing Goal 1, OT) supervision required  -JJ     Time Frame (Bathing Goal 1, OT) long term goal (LTG)  -JJ     Strategies/Barriers (Bathing Goal 1, OT) demonstrating energy conservation techniques with no vcs  -JJ     Progress/Outcomes (Bathing Goal 1, OT) goal met  -JJ     Row Name 04/08/23 0850          Dressing Goal 1 (OT)    Activity/Device (Dressing Goal 1, OT) lower body dressing  -JJ     [place]/Cues Needed (Dressing Goal 1, OT) supervision required;independent  -JJ     Time Frame (Dressing Goal 1, OT) long term goal (LTG)  -JJ     Progress/Outcome (Dressing Goal 1, OT) goal met  -JJ           User Key  (r) = Recorded By, (t) = Taken By, (c) = Cosigned By    Initials Name Provider Type    JTITI  Coleen Andres OTR/L, ZULMAS Occupational Therapist               Clinical Impression     Row Name 04/08/23 0850          Pain Assessment    Pretreatment Pain Rating 0/10 - no pain  -JJ     Posttreatment Pain Rating 0/10 - no pain  -JJ     Row Name 04/08/23 0850          Plan of Care Review    Plan of Care Reviewed With patient  -JJ     Outcome Evaluation OT tx completed. Pt presents alert and oriented x4, sitting up in bed. He is eager to advance his activity and go home. He was able to bring self to sitting at EOB with SBA. Donned socks with Sup only while seated at EOB. Demonstrates improved coordination this am. SBA/CGA for sit <> stand t/f from EOB and to take small steps from bed to chair. Worked through 15 reps x 1 set of B UE strengthening with red theraband. All deficits identified on initial evaluation have been met and pt is at his baseline function in regards to adl independence. Will defer continued mobility and balance training to PT. OT to sign off. Anticipate d/c to LTAC for antibiotics.  -JJ     Row Name 04/08/23 0850          Positioning and Restraints    Pre-Treatment Position in bed  -JJ     Post Treatment Position chair  -JJ     In Chair notified nsg;call light within reach;encouraged to call for assist;patient within staff view;sitting  -JJ           User Key  (r) = Recorded By, (t) = Taken By, (c) = Cosigned By    Initials Name Provider Type    Coleen Martinez OTR/L, ZULMAS Occupational Therapist               Outcome Measures     Row Name 04/08/23 0850          How much help from another is currently needed...    Putting on and taking off regular lower body clothing? 3  -JJ     Bathing (including washing, rinsing, and drying) 3  -JJ     Toileting (which includes using toilet bed pan or urinal) 4  -JJ     Putting on and taking off regular upper body clothing 4  -JJ     Taking care of personal grooming (such as brushing teeth) 4  -JJ     Eating meals 4  -JJ     AM-PAC 6 Clicks Score  (OT) 22  -JJ     Row Name 04/08/23 0906 04/08/23 0800       How much help from another person do you currently need...    Turning from your back to your side while in flat bed without using bedrails? 4  -WK 4  -SP    Moving from lying on back to sitting on the side of a flat bed without bedrails? 4  -WK 4  -SP    Moving to and from a bed to a chair (including a wheelchair)? 3  -WK 3  -SP    Standing up from a chair using your arms (e.g., wheelchair, bedside chair)? 4  -WK 4  -SP    Climbing 3-5 steps with a railing? 3  -WK 2  -SP    To walk in hospital room? 3  -WK 3  -SP    AM-PAC 6 Clicks Score (PT) 21  -WK 20  -SP    Highest level of mobility 6 --> Walked 10 steps or more  -WK 6 --> Walked 10 steps or more  -SP    Row Name 04/08/23 0906 04/08/23 0850       Functional Assessment    Outcome Measure Options AM-PAC 6 Clicks Basic Mobility (PT)  -WK AM-PAC 6 Clicks Daily Activity (OT)  -J          User Key  (r) = Recorded By, (t) = Taken By, (c) = Cosigned By    Initials Name Provider Type    WK Genesis Root PTA Physical Therapist Assistant    Coleen Martinez OTR/L, CSRS Occupational Therapist    SP Xi Perry, RN Registered Nurse              Occupational Therapy Education     Title: PT OT SLP Therapies (Done)     Topic: Occupational Therapy (Done)     Point: ADL training (Done)     Description:   Instruct learner(s) on proper safety adaptation and remediation techniques during self care or transfers.   Instruct in proper use of assistive devices.              Learning Progress Summary           Patient Acceptance, E, VU by MENDEL at 4/8/2023 1453    Acceptance, E,TB, VU by AMY at 4/6/2023 1350    Acceptance, E, VU by MENDEL at 4/5/2023 1245    Acceptance, E, VU by MENDEL at 4/3/2023 0811    Acceptance, E, VU,NR by GABRIELA at 4/1/2023 1445   Family Acceptance, E,TB, VU by AMY at 4/6/2023 1350                   Point: Home exercise program (Done)     Description:   Instruct learner(s) on appropriate technique for  monitoring, assisting and/or progressing therapeutic exercises/activities.              Learning Progress Summary           Patient Acceptance, E, VU by JJ at 4/8/2023 1453    Acceptance, E,TB, VU by CM at 4/6/2023 1350    Acceptance, E, VU,NR by CS at 4/1/2023 1445   Family Acceptance, E,TB, VU by CM at 4/6/2023 1350                   Point: Precautions (Done)     Description:   Instruct learner(s) on prescribed precautions during self-care and functional transfers.              Learning Progress Summary           Patient Acceptance, E, VU by JJ at 4/8/2023 1453    Acceptance, E,TB, VU by CM at 4/6/2023 1350    Acceptance, E, VU by JJ at 4/5/2023 1245    Acceptance, E, VU by JJ at 4/3/2023 0811    Acceptance, E, VU,NR by CS at 4/1/2023 1445   Family Acceptance, E,TB, VU by CM at 4/6/2023 1350                   Point: Body mechanics (Done)     Description:   Instruct learner(s) on proper positioning and spine alignment during self-care, functional mobility activities and/or exercises.              Learning Progress Summary           Patient Acceptance, E, VU by JJ at 4/8/2023 1453    Acceptance, E,TB, VU by CM at 4/6/2023 1350    Acceptance, E, VU by JJ at 4/5/2023 1245    Acceptance, E, VU by JJ at 4/3/2023 0811    Acceptance, E, VU,NR by  at 4/1/2023 1445   Family Acceptance, E,TB, VU by CM at 4/6/2023 1350                               User Key     Initials Effective Dates Name Provider Type Discipline     02/03/23 -  Coretta Siddiqi, OTR/L, CNT Occupational Therapist OT     03/02/23 -  Dianelys Carlton, RN Registered Nurse Nurse    TITI 11/10/21 -  Coleen Andres OTR/L, CSRS Occupational Therapist OT              OT Recommendation and Plan  Planned Therapy Interventions (OT): activity tolerance training, BADL retraining, functional balance retraining, transfer/mobility retraining, occupation/activity based interventions, patient/caregiver education/training, cognitive/visual perception  retraining  Therapy Frequency (OT): 3 times/wk  Plan of Care Review  Plan of Care Reviewed With: patient  Progress: no change  Outcome Evaluation: OT tx completed. Pt presents alert and oriented x4, sitting up in bed. He is eager to advance his activity and go home. He was able to bring self to sitting at EOB with SBA. Donned socks with Sup only while seated at EOB. Demonstrates improved coordination this am. SBA/CGA for sit <> stand t/f from EOB and to take small steps from bed to chair. Worked through 15 reps x 1 set of B UE strengthening with red theraband. All deficits identified on initial evaluation have been met and pt is at his baseline function in regards to adl independence. Will defer continued mobility and balance training to PT. OT to sign off. Anticipate d/c to LTAC for antibiotics.  Plan of Care Reviewed With: patient  Outcome Evaluation: OT tx completed. Pt presents alert and oriented x4, sitting up in bed. He is eager to advance his activity and go home. He was able to bring self to sitting at EOB with SBA. Donned socks with Sup only while seated at EOB. Demonstrates improved coordination this am. SBA/CGA for sit <> stand t/f from EOB and to take small steps from bed to chair. Worked through 15 reps x 1 set of B UE strengthening with red theraband. All deficits identified on initial evaluation have been met and pt is at his baseline function in regards to adl independence. Will defer continued mobility and balance training to PT. OT to sign off. Anticipate d/c to LTAC for antibiotics.     Time Calculation:    Time Calculation- OT     Row Name 04/08/23 0850             Time Calculation- OT    OT Start Time 0850  -      OT Stop Time 0914  -      OT Time Calculation (min) 24 min  -      Total Timed Code Minutes- OT 24 minute(s)  -      OT Received On 04/08/23  -            User Key  (r) = Recorded By, (t) = Taken By, (c) = Cosigned By    Initials Name Provider Type    Coleen Martinez  PETER/L, ALEISHA Occupational Therapist              Therapy Charges for Today     Code Description Service Date Service Provider Modifiers Qty    [id number] HC OT SELF CARE/MGMT/TRAIN EA 15 MIN 4/8/2023 Coleen Andres OTR/L, ALEISHA GO 2             OT Discharge Summary  Anticipated Discharge Disposition (OT): home with assist  Reason for Discharge: At baseline function  Outcomes Achieved: Refer to plan of care for updates on goals achieved  Discharge Destination: LTACH    JOSR [place] CSRS  4/8/2023

## 2023-04-08 NOTE — PROGRESS NOTES
Infectious Diseases Progress Note    Patient:  Elijah Escobar  YOB: 1955  MRN: [id number]   Admit date: 3/31/2023   Admitting Physician: Flaco Portillo, *  Primary Care Physician: Brianna Martinez APRN    Chief Complaint/Interval History: He is comfortable.  He is up to chair.  He is without headache.  He is without new neurological symptoms.  He is without fever.  Tolerating antibiotic treat without diarrhea or rash.    Intake/Output Summary (Last 24 hours) at 4/8/2023 1012  Last data filed at 4/8/2023 0800  Gross per 24 hour   Intake 920 ml   Output 2730 ml   Net -1810 ml     Allergies: No Known Allergies  Current Scheduled Medications:   aspirin, 81 mg, Oral, Daily  atorvastatin, 20 mg, Oral, Nightly  budesonide-formoterol, 2 puff, Inhalation, BID - RT  digoxin, 125 mcg, Oral, Daily  dilTIAZem CD, 240 mg, Oral, Q24H  enoxaparin, 1 mg/kg, Subcutaneous, Q12H  furosemide, 40 mg, Oral, Daily  gabapentin, 300 mg, Oral, TID  insulin detemir, 30 Units, Subcutaneous, Q12H  insulin lispro, 0-14 Units, Subcutaneous, TID AC  ipratropium, 0.5 mg, Nebulization, 4x Daily - RT  levETIRAcetam, 500 mg, Oral, BID  meropenem, 1 g, Intravenous, Q8H  methylnaltrexone, 12 mg, Subcutaneous, Every Other Day  metoprolol tartrate, 50 mg, Oral, Q12H  pantoprazole, 40 mg, Oral, Q AM  polyethylene glycol, 17 g, Oral, Daily  potassium chloride, 20 mEq, Oral, Daily  sennosides-docusate, 1 tablet, Oral, Daily  sodium chloride, 10 mL, Intravenous, Q12H      Current PRN Medications:  •  acetaminophen **OR** acetaminophen **OR** acetaminophen  •  butalbital-acetaminophen-caffeine  •  dextrose  •  dextrose  •  glucagon (human recombinant)  •  nicotine  •  ondansetron **OR** ondansetron  •  oxyCODONE-acetaminophen  •  oxyCODONE-acetaminophen  •  Pharmacy to Dose enoxaparin (LOVENOX)  •  sodium chloride  •  sodium chloride    Review of Systems see HPI    Vital Signs:  /68   Pulse 106   Temp 98.1 °F (36.7 °C) (Oral)   " Resp 12   Ht 177.8 cm (70\")   Wt 121 kg (267 lb 9.6 oz)   SpO2 95%   BMI 38.40 kg/m²     Physical Exam  Vital signs - reviewed.  Line/IV site - No erythema, warmth, induration, or tenderness.  Abdomen soft nontender  Right cranial dressing clean and dry  Drain in place with minimal serosanguineous drainage  Lab Results:  CBC:   Results from last 7 days   Lab Units 04/07/23  0249 04/06/23  0352 04/04/23  0805 04/03/23  0436 04/02/23  0613   WBC 10*3/mm3 17.47* 13.39* 14.40* 11.00* 17.92*   HEMOGLOBIN g/dL 11.0* 10.7* 11.6* 11.0* 11.9*   HEMATOCRIT % 34.5* 31.6* 34.8* 34.5* 36.6*   PLATELETS 10*3/mm3 246 220 234 188 192     BMP:  Results from last 7 days   Lab Units 04/07/23  0249 04/06/23  0831 04/06/23  0352 04/05/23  1939 04/05/23  1519 04/05/23  1207 04/05/23  0304 04/04/23  0745 04/03/23  0436   SODIUM mmol/L 137 137 136 135* 134* 132* 134* 136 139   POTASSIUM mmol/L 4.0 3.4* 3.5 3.5 3.6 3.5 3.7 3.3* 3.6   CHLORIDE mmol/L 102 102 102 99 97* 96* 97* 98 102   CO2 mmol/L 23.0 25.0 23.0 23.0 23.0 24.0 20.0* 21.0* 23.0   BUN mg/dL 41* 40* 43* 46* 45* 47* 46* 42* 36*   CREATININE mg/dL 1.14 1.28* 1.19 1.50* 1.49* 1.46* 1.42* 1.36* 1.16   GLUCOSE mg/dL 240* 178* 159* 205* 331* 436* 408* 343* 102*   CALCIUM mg/dL 8.1* 7.9* 7.9* 8.0* 8.0* 7.9* 8.0* 8.3* 8.6   ALT (SGPT) U/L  --   --   --   --   --   --   --  10 11     Culture Results:   Wound Culture   Date Value Ref Range Status   04/01/2023 Heavy growth (4+) Serratia marcescens (A)  Final     Respiratory Culture   Date Value Ref Range Status   04/03/2023 Rejected  Final     Radiology: None  Additional Studies Reviewed: None    Impression:   Infection right frontal cranioplasty.  Serratia on culture.    Recommendations:   Continue meropenem  Planning IV antibiotics through April 20   evaluating for LTAC or skilled nursing facility  Planning transition to oral antibiotic treatment levofloxacin or Bactrim following completion of IV therapy  Continue to " follow    Edwin Jeffers MD

## 2023-04-08 NOTE — PROGRESS NOTES
Neurosurgery Daily Progress Note    HPI:  Elijah Escobar is a 67 y.o. male with a significant medical history of hypertension, diabetes, hyperlipidemia, seizures, craniotomy for tumor (6/16/2022), craniotomy for washout (7/1/2022), craniectomy (10/4/2022), coronary artery disease, diabetes, erectile dysfunction, GERD, hypertension, hyperlipidemia, tobacco abuse, and obesity.  He presents today with a complaint of a 4-5 days onset of progressively worsening right frontal, temporal, and periorbital edema and associated symptoms to include, but not limited to generalized fatigue, chills, dyspnea, intermittent nausea without vomiting, and states he's felt feverish.  Physical exam findings of neurologically intact with right frontal, temporal, and periorbital swelling.  WBC elevated at 15.  3 to an CRP elevated at 14.75.  Imaging pending.    Assessment:   Past Medical History:   Diagnosis Date   • Brain tumor    • Coronary artery disease    • COVID-19 vaccine series completed     MODERNA X 3; LAST DOSE 3/2022   • Diabetes    • Erectile dysfunction    • GERD (gastroesophageal reflux disease)    • Hypercholesteremia    • Hypertension    • Seizure      Active Hospital Problems    Diagnosis    • **Swelling of scalp    • COPD (chronic obstructive pulmonary disease)    • Obesity (BMI 30-39.9)    • Tobacco abuse, in remission    • Acute systolic heart failure    • ADDIE (obstructive sleep apnea)    • Type 2 myocardial infarction due to arrhythmia    • Acute pulmonary edema due to A-fib RVR    • Acute respiratory failure with hypoxia    • Post-operative infection    • Paroxysmal atrial fibrillation with rapid ventricular response    • Type 2 diabetes mellitus with hyperglycemia and peripheral neuropathy, with long-term current use of insulin (HCC)    • Coronary artery disease    • Meningioma s/p craniotomy      Plan:   Neuro: Stable, intact/at baseline  Infected cranial flap      4 Days Post-Op (4/4/2023)  craniectomy and  "washout   Postop CT stable. No acute abnormalities.   Flap tapped I&D cultures 4+ gram-negative bacilli    Heavy 4+ Serratia marcescens   ID consulted.  Appreciate assistance.  JUAN = 20 mL, keep  Remain in ICU for close neurologic monitoring  Continue neuro exams per policy.  Call for decline    CV: AFib with RVR and heart strain and trop bump.  Stabilized    Appreciate cardiology  Pulm: Possible PNA.  Resolved.  Chest x-ray clear  :  No patel.  UTI.  Resolved  FEN: Reg diet for now   Fluid balance for sepsis vs pulmonary edema   Carb consistent diet  Endocrine: Glucose improving.  Continue Glucomander protocol   GI: SHERIF.  +BM  ID: WBC up 17.5, CRP and ESR elevated   Infected cranial flap  CSF: no growth to date   PNA.  Resolved   UTI.  Resolved   Continue meropenem per ID.  Appreciate assist  Heme:  DVT prophylaxis  Pain: Tolerable at present  Dispo: PT/OT    DC pending insurance acceptance to LTAC.  Patient agreeable     Chief complaint:   4-5 days onset of progressively worsening right frontal, temporal, and periorbital edema and associated symptoms to include, but not limited to generalized fatigue, chills, dyspnea, intermittent nausea without vomiting, and states he's felt feverish.    HPI  Subjective  Doing well    Temp:  [97.8 °F (36.6 °C)-98.2 °F (36.8 °C)] 98.1 °F (36.7 °C)  Heart Rate:  [] 106  Resp:  [10-19] 12  BP: (106-148)/(65-95) 109/68    Output by Drain (mL) 04/07/23 0701 - 04/07/23 1900 04/07/23 1901 - 04/08/23 0700 04/08/23 0701 - 04/08/23 1020 Range Total   Closed/Suction Drain 1 Right Scalp Bulb 7 Fr. 5 15 10 30     Objective:  Vital signs: (most recent): Blood pressure 109/68, pulse 106, temperature 98.1 °F (36.7 °C), temperature source Oral, resp. rate 12, height 177.8 cm (70\"), weight 121 kg (267 lb 9.6 oz), SpO2 95 %.      Neurologic Exam     Mental Status   Oriented to person, place, and time.   Speech: speech is normal   Level of consciousness: alert    Oriented x3.  Follows " commands without prompting showing thumbs up and 2 fingers bilaterally.     Cranial Nerves     CN III, IV, VI   Pupils are equal, round, and reactive to light.  Extraocular motions are normal.     CN V   Facial sensation intact.     CN VII   Facial expression full, symmetric.     Motor Exam   Right arm pronator drift: absent  Left arm pronator drift: absent    Strength   Right deltoid: 5/5  Left deltoid: 5/5  Right biceps: 5/5  Left biceps: 5/5  Right triceps: 5/5  Left triceps: 5/5  Right iliopsoas: 5/5  Left iliopsoas: 5/5  Right quadriceps: 5/5  Left quadriceps: 5/5  Right gastroc: 5/5  Left gastroc: 5/5  Moves all extremities equal and symmetric     Sensory Exam   Light touch normal.     Drains: * No LDAs found *    Imaging Results (Last 24 Hours)     ** No results found for the last 24 hours. **        Lab Results (last 24 hours)     Procedure Component Value Units Date/Time    POC Glucose Once [[id number]]  (Normal) Collected: 04/08/23 0801    Specimen: Blood Updated: 04/08/23 0814     Glucose 85 mg/dL      Comment: : [id number] Jayson PurvisoryMeter ID: [id number]       POC Glucose Once [[id number]]  (Abnormal) Collected: 04/07/23 2024    Specimen: Blood Updated: 04/07/23 2035     Glucose 210 mg/dL      Comment: : [id number] Iris BejaranoahMeter ID: [id number]       POC Glucose Once [[id number]]  (Abnormal) Collected: 04/07/23 1632    Specimen: Blood Updated: 04/07/23 1643     Glucose 312 mg/dL      Comment: : [id number] Saul AllysonMeter ID: [id number]       AFB Culture - Cerebrospinal Fluid, Lumbar Puncture [[id number]] Collected: 03/31/23 1450    Specimen: Cerebrospinal Fluid from Lumbar Puncture Updated: 04/07/23 1531     AFB Culture No AFB isolated at 1 week     AFB Stain No acid fast bacilli seen on direct smear    POC Glucose Once [[id number]]  (Abnormal) Collected: 04/07/23 1113    Specimen: Blood Updated: 04/07/23 1126     Glucose 234 mg/dL      Comment: : [id number] Saul AllysonMeter ID:  [id number]           Rahul Arnold, APRN

## 2023-04-08 NOTE — PLAN OF CARE
Goal Outcome Evaluation:  Plan of Care Reviewed With: patient        Progress: no change  Outcome Evaluation: OT tx completed. Pt presents alert and oriented x4, sitting up in bed. He is eager to advance his activity and go home. He was able to bring self to sitting at EOB with SBA. Donned socks with Sup only while seated at EOB. Demonstrates improved coordination this am. SBA/CGA for sit <> stand t/f from EOB and to take small steps from bed to chair. Worked through 15 reps x 1 set of B UE strengthening with red theraband. All deficits identified on initial evaluation have been met and pt is at his baseline function in regards to adl independence. Will defer continued mobility and balance training to PT. OT to sign off. Anticipate d/c to LTAC for antibiotics.

## 2023-04-08 NOTE — THERAPY TREATMENT NOTE
Acute Care - Physical Therapy Treatment Note  [facility]     Patient Name: Elijah Escobar  : 1955  MRN: [id number]  Today's Date: 2023      Visit Dx:     ICD-10-CM ICD-9-CM   1. S/P craniotomy  Z98.890 V45.89   2. Impaired mobility  Z74.09 799.89   3. Infection of deep incisional surgical site after procedure, initial encounter  T81.42XA 998.59     Patient Active Problem List   Diagnosis   • Meningioma s/p craniotomy   • Hypertension   • GERD (gastroesophageal reflux disease)   • Coronary artery disease   • Class 2 severe obesity due to excess calories with serious comorbidity and body mass index (BMI) of 38.0 to 38.9 in adult   • Type 2 diabetes mellitus with hyperglycemia and peripheral neuropathy, with long-term current use of insulin (HCC)   • Leukocytosis   • Other specified anemias   • Paroxysmal atrial fibrillation with rapid ventricular response   • Post-operative infection   • Smoker   • History of cranioplasty   • Facial edema   • Swelling of scalp   • Acute pulmonary edema due to A-fib RVR   • Acute respiratory failure with hypoxia   • Type 2 myocardial infarction due to arrhythmia   • COPD (chronic obstructive pulmonary disease)   • Obesity (BMI 30-39.9)   • Tobacco abuse, in remission   • Acute systolic heart failure   • ADDIE (obstructive sleep apnea)     Past Medical History:   Diagnosis Date   • Brain tumor    • Coronary artery disease    • COVID-19 vaccine series completed     MODERNA X 3; LAST DOSE 3/2022   • Diabetes    • Erectile dysfunction    • GERD (gastroesophageal reflux disease)    • Hypercholesteremia    • Hypertension    • Seizure      Past Surgical History:   Procedure Laterality Date   • BALLOON ANGIOPLASTY, ARTERY Right    • COLONOSCOPY     • CRANIECTOMY Right 2022    Procedure: CRANIECTOMY WITH INCISIONAL WASHOUT;  Surgeon: Felipe Banerjee MD;  Location: [facility];  Service: Neurosurgery;  Laterality: Right;   • CRANIOPLASTY Right 10/4/2022    Procedure: CRANIOPLASTY  right with Lumbar drain;  Surgeon: Flaco Portillo MD;  Location:  [facility];  Service: Neurosurgery;  Laterality: Right;   • CRANIOPLASTY N/A 4/4/2023    Procedure: CRANIOPLASTY, removal, right, horseshoe;  Surgeon: Flaco Portillo MD;  Location:  [facility];  Service: Neurosurgery;  Laterality: N/A;   • CRANIOTOMY FOR TUMOR Right 6/16/2022    Procedure: CRANIOTOMY FOR TUMOR STERIOTACTIC WITH BRAIN LAB, right;  Surgeon: Flaco Portillo MD;  Location:  [facility];  Service: Neurosurgery;  Laterality: Right;     PT Assessment (last 12 hours)     PT Evaluation and Treatment     Row Name 04/08/23 0906          Physical Therapy Time and Intention    Subjective Information no complaints  -WK     Document Type therapy note (daily note)  -WK     Mode of Treatment physical therapy  -WK     Comment limited activity due to not being able to wear helmet at this time.   -WK     Row Name 04/08/23 0906          General Information    Existing Precautions/Restrictions fall;oxygen therapy device and L/min;other (see comments)  helmet on AAT  -WK     Row Name 04/08/23 0906          Bed Mobility    Comment, (Bed Mobility) in chair  -WK     Row Name 04/08/23 0906          Sit-Stand Transfer    Sit-Stand Hodgeman (Transfers) verbal cues;contact guard  -WK     Row Name 04/08/23 0906          Stand-Sit Transfer    Stand-Sit Hodgeman (Transfers) verbal cues;contact guard  -WK     Row Name 04/08/23 0906          Gait/Stairs (Locomotion)    Comment, (Gait/Stairs) performed marching in place staying close to the chair x3. Had to performed backward steps and side step to stay close to chair.  -WK     Row Name 04/08/23 0906          Balance    Dynamic Standing Balance contact guard  -WK     Comment, Balance static standing SBA, dynamic sitting SBA  -WK     Row Name 04/08/23 0906          Motor Skills    Therapeutic Exercise aerobic  -WK     Row Name 04/08/23 0906          Aerobic Exercise    Comment, Aerobic Exercise  (Therapeutic Exercise) AROM BLE 20 reps sitting  -WK     Row Name             Wound 04/04/23 1111 Right temporal region Incision    Wound - Properties Group Placement Date: 04/04/23  -CHRISTIANO Placement Time: 1111  -CHRISTIANO Side: Right  -CHRISTIANO Location: temporal region  -CHRISTIANO Primary Wound Type: Incision  -CHRISTIANO    Retired Wound - Properties Group Placement Date: 04/04/23  -CHRISTIANO Placement Time: 1111  -CHRISTIANO Side: Right  -CHRISTIANO Location: temporal region  -CHRISTIANO Primary Wound Type: Incision  -CHRISTIANO    Retired Wound - Properties Group Date first assessed: 04/04/23  -CHRISTIANO Time first assessed: 1111  -CHRISTIANO Side: Right  -CHRISTIANO Location: temporal region  -CHRISTIANO Primary Wound Type: Incision  -CHRISTIANO    Row Name 04/08/23 0906          Positioning and Restraints    Pre-Treatment Position sitting in chair/recliner  -WK     Post Treatment Position chair  -WK     In Chair sitting;call light within reach;encouraged to call for assist  -WK           User Key  (r) = Recorded By, (t) = Taken By, (c) = Cosigned By    Initials Name Provider Type    Ricky George, RN Registered Nurse    Genesis Steward PTA Physical Therapist Assistant                Physical Therapy Education     Title: PT OT SLP Therapies (Done)     Topic: Physical Therapy (Done)     Point: Mobility training (Done)     Learning Progress Summary           Patient Acceptance, E,TB, VU by CM at 4/6/2023 1350    Acceptance, E, VU by SB at 4/5/2023 1605    Comment: helmet fit, safety, POC    Acceptance, E, VU by CHRISTIANO at 4/3/2023 1353    Comment: gait, safety,    Acceptance, E, VU by SB at 4/1/2023 1405    Comment: pt edu on POC, benefits of act, PLB and d/c plans   Family Acceptance, E,TB, VU by CM at 4/6/2023 1350                   Point: Home exercise program (Done)     Learning Progress Summary           Patient Acceptance, E,TB, VU by CM at 4/6/2023 1350   Family Acceptance, E,TB, VU by CM at 4/6/2023 1350                   Point: Body mechanics (Done)     Learning Progress Summary           Patient  Acceptance, E,TB, VU by CM at 4/6/2023 1350   Family Acceptance, E,TB, VU by CM at 4/6/2023 1350                   Point: Precautions (Done)     Learning Progress Summary           Patient Acceptance, E,TB, VU by CM at 4/6/2023 1350    Acceptance, E, VU by SB at 4/5/2023 1605    Comment: helmet fit, safety, POC    Acceptance, E, VU by SB at 4/1/2023 1405    Comment: pt edu on POC, benefits of act, PLB and d/c plans   Family Acceptance, E,TB, VU by CM at 4/6/2023 1350                               User Key     Initials Effective Dates Name Provider Type Discipline    CHRISTIANO 02/03/23 -  Александр Saravia, PTA Physical Therapist Assistant PT    CM 03/02/23 -  Dianelys Carlton RN Registered Nurse Nurse    SB 06/16/21 -  Erika Javier, BELEN DPT Physical Therapist PT              PT Recommendation and Plan     Plan of Care Reviewed With: patient  Progress: improving  Outcome Evaluation: Pt sitting in chair and educated on limiting activity due to not being able to wear his helmet. Pt is eager to go home. Pt performed marching in place CGA with no LOB. Pt performed backward steps and side steps to stay close to chair.   Outcome Measures     Row Name 04/08/23 0906 04/07/23 0822          How much help from another person do you currently need...    Turning from your back to your side while in flat bed without using bedrails? 4  -WK 4  -TB     Moving from lying on back to sitting on the side of a flat bed without bedrails? 4  -WK 4  -TB     Moving to and from a bed to a chair (including a wheelchair)? 3  -WK --  Defer right now  -TB     Standing up from a chair using your arms (e.g., wheelchair, bedside chair)? 4  -WK --  Defer right now  -TB     Climbing 3-5 steps with a railing? 3  -WK --  Defer right now  -TB     To walk in hospital room? 3  -WK --  Defer right now  -TB     AM-PAC 6 Clicks Score (PT) 21  -WK --        Functional Assessment    Outcome Measure Options AM-PAC 6 Clicks Basic Mobility (PT)  -WK AM-PAC 6 Clicks  Basic Mobility (PT)  -TB           User Key  (r) = Recorded By, (t) = Taken By, (c) = Cosigned By    Initials Name Provider Type    WK Genesis Root PTA Physical Therapist Assistant    TB Tere Miller PTA Physical Therapist Assistant                 Time Calculation:    PT Charges     Row Name 04/08/23 0959             Time Calculation    Start Time 0906  -WK      Stop Time 0946  -WK      Time Calculation (min) 40 min  -WK      PT Received On 04/08/23  -WK         Time Calculation- PT    Total Timed Code Minutes- PT 40 minute(s)  -WK            User Key  (r) = Recorded By, (t) = Taken By, (c) = Cosigned By    Initials Name Provider Type    WK Genesis Root PTA Physical Therapist Assistant              Therapy Charges for Today     Code Description Service Date Service Provider Modifiers Qty    75395636078 HC PT THERAPEUTIC ACT EA 15 MIN 4/8/2023 Genesis Root PTA GP 2    04739862268 HC PT THER PROC EA 15 MIN 4/8/2023 Genesis Root PTA GP 1          PT G-Codes  Outcome Measure Options: AM-PAC 6 Clicks Basic Mobility (PT)  AM-PAC 6 Clicks Score (PT): 21  AM-PAC 6 Clicks Score (OT): 18    Genesis Root PTA  4/8/2023

## 2023-04-08 NOTE — PLAN OF CARE
Problem: Adult Inpatient Plan of Care  Goal: Plan of Care Review  Outcome: Ongoing, Progressing  Flowsheets (Taken 4/8/2023 0181)  Progress: improving  Plan of Care Reviewed With: patient  Outcome Evaluation: Pt sitting in chair and educated on limiting activity due to not being able to wear his helmet. Pt is eager to go home. Pt performed marching in place CGA with no LOB. Pt performed backward steps and side steps to stay close to chair.

## 2023-04-08 NOTE — PLAN OF CARE
Goal Outcome Evaluation:              Outcome Evaluation: Pt up to chair x2 hours. Alert and oriented x4. VS stable. 1 moderate BM. Uses urinal for voiding independently. Maintained JUAN drain to R head, 20 ml output.      Problem: Adult Inpatient Plan of Care  Goal: Plan of Care Review  Outcome: Ongoing, Progressing  Flowsheets (Taken 4/8/2023 1836)  Outcome Evaluation: Pt up to chair x2 hours. Alert and oriented x4. VS stable. 1 moderate BM. Uses urinal for voiding independently. Maintained JUAN drain to R head, 20 ml output.  Goal: Patient-Specific Goal (Individualized)  Outcome: Ongoing, Progressing  Goal: Absence of Hospital-Acquired Illness or Injury  Outcome: Ongoing, Progressing  Intervention: Identify and Manage Fall Risk  Recent Flowsheet Documentation  Taken 4/8/2023 1700 by Xi Perry RN  Safety Promotion/Fall Prevention: safety round/check completed  Taken 4/8/2023 1600 by Xi Perry RN  Safety Promotion/Fall Prevention: safety round/check completed  Taken 4/8/2023 1500 by Xi Perry RN  Safety Promotion/Fall Prevention: safety round/check completed  Taken 4/8/2023 1400 by Xi Perry RN  Safety Promotion/Fall Prevention: safety round/check completed  Taken 4/8/2023 1300 by Xi Perry RN  Safety Promotion/Fall Prevention: safety round/check completed  Taken 4/8/2023 1200 by Xi Perry RN  Safety Promotion/Fall Prevention: safety round/check completed  Taken 4/8/2023 1100 by Xi Perry RN  Safety Promotion/Fall Prevention: safety round/check completed  Taken 4/8/2023 1000 by Xi Perry RN  Safety Promotion/Fall Prevention: safety round/check completed  Taken 4/8/2023 0900 by Xi Perry RN  Safety Promotion/Fall Prevention: safety round/check completed  Taken 4/8/2023 0800 by Xi Perry RN  Safety Promotion/Fall Prevention: safety round/check completed  Intervention: Prevent Skin Injury  Recent Flowsheet Documentation  Taken 4/8/2023 1700 by Orlando  SELENA Layne  Body Position:   position changed independently   sitting up in bed  Taken 4/8/2023 1600 by Xi Perry RN  Body Position:   position changed independently   sitting up in bed  Taken 4/8/2023 1500 by Xi Perry RN  Body Position:   position changed independently   sitting up in bed  Taken 4/8/2023 1400 by Xi Perry RN  Body Position:   position changed independently   sitting up in bed  Taken 4/8/2023 1300 by Xi Perry RN  Body Position: position changed independently  Taken 4/8/2023 1200 by Xi Perry RN  Body Position:   position changed independently   sitting up in bed  Taken 4/8/2023 1100 by Xi Perry RN  Body Position: position changed independently  Taken 4/8/2023 1000 by Xi Perry RN  Body Position: position changed independently  Taken 4/8/2023 0800 by Xi Perry RN  Body Position:   position changed independently   sitting up in bed  Skin Protection:   incontinence pads utilized   tubing/devices free from skin contact  Intervention: Prevent and Manage VTE (Venous Thromboembolism) Risk  Recent Flowsheet Documentation  Taken 4/8/2023 1500 by Xi Perry RN  Activity Management: up to bedside commode  Taken 4/8/2023 1200 by Xi Perry RN  Activity Management: activity encouraged  Taken 4/8/2023 1100 by Xi Perry RN  Activity Management: back to bed  Taken 4/8/2023 1000 by Xi Perry RN  Activity Management: up in chair  Taken 4/8/2023 0900 by Xi Perry RN  Activity Management: up in chair  Taken 4/8/2023 0800 by Xi Perry RN  Activity Management: bedrest  VTE Prevention/Management: (see MAR)   bilateral   sequential compression devices on   other (see comments)  Range of Motion: active ROM (range of motion) encouraged  Intervention: Prevent Infection  Recent Flowsheet Documentation  Taken 4/8/2023 0800 by Xi Perry RN  Infection Prevention:   single patient room provided   hand hygiene promoted  Goal:  Optimal Comfort and Wellbeing  Outcome: Ongoing, Progressing  Intervention: Monitor Pain and Promote Comfort  Recent Flowsheet Documentation  Taken 4/8/2023 1500 by Xi Perry RN  Pain Management Interventions:   pillow support provided   position adjusted   quiet environment facilitated   see MAR  Intervention: Provide Person-Centered Care  Recent Flowsheet Documentation  Taken 4/8/2023 0800 by Xi Perry RN  Trust Relationship/Rapport:   care explained   emotional support provided   questions answered   thoughts/feelings acknowledged  Goal: Readiness for Transition of Care  Outcome: Ongoing, Progressing     Problem: Diabetes Comorbidity  Goal: Blood Glucose Level Within Targeted Range  Outcome: Ongoing, Progressing  Intervention: Monitor and Manage Glycemia  Recent Flowsheet Documentation  Taken 4/8/2023 0800 by Xi Perry RN  Glycemic Management: blood glucose monitored     Problem: Hypertension Comorbidity  Goal: Blood Pressure in Desired Range  Outcome: Ongoing, Progressing     Problem: Fall Injury Risk  Goal: Absence of Fall and Fall-Related Injury  Outcome: Ongoing, Progressing  Intervention: Identify and Manage Contributors  Recent Flowsheet Documentation  Taken 4/8/2023 0800 by Xi Perry RN  Self-Care Promotion: independence encouraged  Intervention: Promote Injury-Free Environment  Recent Flowsheet Documentation  Taken 4/8/2023 1700 by Xi Perry RN  Safety Promotion/Fall Prevention: safety round/check completed  Taken 4/8/2023 1600 by Xi Perry RN  Safety Promotion/Fall Prevention: safety round/check completed  Taken 4/8/2023 1500 by Xi Perry RN  Safety Promotion/Fall Prevention: safety round/check completed  Taken 4/8/2023 1400 by Xi Perry RN  Safety Promotion/Fall Prevention: safety round/check completed  Taken 4/8/2023 1300 by Xi Perry RN  Safety Promotion/Fall Prevention: safety round/check completed  Taken 4/8/2023 1200 by Xi Perry  RN  Safety Promotion/Fall Prevention: safety round/check completed  Taken 4/8/2023 1100 by Xi Perry RN  Safety Promotion/Fall Prevention: safety round/check completed  Taken 4/8/2023 1000 by Xi Perry RN  Safety Promotion/Fall Prevention: safety round/check completed  Taken 4/8/2023 0900 by Xi Perry RN  Safety Promotion/Fall Prevention: safety round/check completed  Taken 4/8/2023 0800 by Xi Perry RN  Safety Promotion/Fall Prevention: safety round/check completed     Problem: Noninvasive Ventilation Acute  Goal: Effective Unassisted Ventilation and Oxygenation  Outcome: Ongoing, Progressing     Problem: Skin Injury Risk Increased  Goal: Skin Health and Integrity  Outcome: Ongoing, Progressing  Intervention: Optimize Skin Protection  Recent Flowsheet Documentation  Taken 4/8/2023 1700 by Xi Perry RN  Head of Bed (HOB) Positioning: HOB at 30-45 degrees  Taken 4/8/2023 1600 by Xi Perry RN  Head of Bed (HOB) Positioning: HOB at 30 degrees  Taken 4/8/2023 1500 by Xi Perry RN  Head of Bed (HOB) Positioning: HOB at 30 degrees  Taken 4/8/2023 1400 by Xi Perry RN  Head of Bed (HOB) Positioning: HOB at 30 degrees  Taken 4/8/2023 1300 by Xi Perry RN  Head of Bed (HOB) Positioning: HOB at 30 degrees  Taken 4/8/2023 1200 by Xi Perry RN  Head of Bed (HOB) Positioning: HOB at 20-30 degrees  Taken 4/8/2023 1100 by Xi Perry RN  Head of Bed (HOB) Positioning: HOB at 20-30 degrees  Taken 4/8/2023 0800 by Xi Perry RN  Pressure Reduction Techniques: frequent weight shift encouraged  Head of Bed (HOB) Positioning: HOB at 30 degrees  Pressure Reduction Devices:   pressure-redistributing mattress utilized   positioning supports utilized  Skin Protection:   incontinence pads utilized   tubing/devices free from skin contact

## 2023-04-09 NOTE — PLAN OF CARE
Goal Outcome Evaluation:  Pt. States that he rested well tonight. VSS. Cranial dressing dry and intact. Pt. With no c/o pain this shift.

## 2023-04-09 NOTE — PLAN OF CARE
Goal Outcome Evaluation:  Plan of Care Reviewed With: patient        Progress: improving  Outcome Evaluation: Pt up to chair x 3 hours. Vital signs stable. Alert and oriented x4. Complained of 9/10 head pain x2 today. Resolves with prn medication and dim lights. Maintained JUAN drain, 10 ml output.      Problem: Adult Inpatient Plan of Care  Goal: Plan of Care Review  Outcome: Ongoing, Progressing  Flowsheets (Taken 4/9/2023 1826)  Progress: improving  Plan of Care Reviewed With: patient  Outcome Evaluation: Pt up to chair x 3 hours. Vital signs stable. Alert and oriented x4. Complained of 9/10 head pain x2 today. Resolves with prn medication and dim lights. Maintained JUAN drain, 10 ml output.  Goal: Patient-Specific Goal (Individualized)  Outcome: Ongoing, Progressing  Goal: Absence of Hospital-Acquired Illness or Injury  Outcome: Ongoing, Progressing  Intervention: Identify and Manage Fall Risk  Recent Flowsheet Documentation  Taken 4/9/2023 1800 by Xi Perry RN  Safety Promotion/Fall Prevention: safety round/check completed  Taken 4/9/2023 1700 by Xi Perry RN  Safety Promotion/Fall Prevention: safety round/check completed  Taken 4/9/2023 1600 by Xi Perry RN  Safety Promotion/Fall Prevention: safety round/check completed  Taken 4/9/2023 1500 by Xi Perry RN  Safety Promotion/Fall Prevention: safety round/check completed  Taken 4/9/2023 1400 by Xi Perry RN  Safety Promotion/Fall Prevention: safety round/check completed  Taken 4/9/2023 1300 by Xi Perry RN  Safety Promotion/Fall Prevention: safety round/check completed  Taken 4/9/2023 1100 by Xi Perry RN  Safety Promotion/Fall Prevention: safety round/check completed  Taken 4/9/2023 1000 by Xi Perry RN  Safety Promotion/Fall Prevention: safety round/check completed  Taken 4/9/2023 0900 by Xi Perry RN  Safety Promotion/Fall Prevention: safety round/check completed  Taken 4/9/2023 0800 by Orlando  SELENA Layne  Safety Promotion/Fall Prevention: safety round/check completed  Intervention: Prevent Skin Injury  Recent Flowsheet Documentation  Taken 4/9/2023 1800 by Xi Perry RN  Body Position:   position changed independently   sitting up in bed  Taken 4/9/2023 1700 by Xi Perry RN  Body Position:   position changed independently   side-lying  Taken 4/9/2023 1600 by Xi Perry RN  Body Position:   position changed independently   sitting up in bed  Taken 4/9/2023 1500 by Xi Perry RN  Body Position:   position changed independently   side-lying  Taken 4/9/2023 1400 by Xi Perry RN  Body Position:   position changed independently   sitting up in bed  Taken 4/9/2023 1300 by Xi Perry RN  Body Position:   position changed independently   side-lying  Taken 4/9/2023 1200 by Xi Perry RN  Body Position:   sitting up in bed   position changed independently   lower extremity elevated  Taken 4/9/2023 1100 by Xi Perry RN  Body Position: position changed independently  Taken 4/9/2023 1000 by Xi Perry RN  Body Position: position changed independently  Taken 4/9/2023 0800 by Xi Perry RN  Body Position:   sitting up in bed   position changed independently  Skin Protection:   incontinence pads utilized   skin-to-device areas padded  Intervention: Prevent and Manage VTE (Venous Thromboembolism) Risk  Recent Flowsheet Documentation  Taken 4/9/2023 1200 by Xi Perry RN  Activity Management:   ambulated in room   back to bed  Taken 4/9/2023 1100 by Xi Perry RN  Activity Management: up in chair  Taken 4/9/2023 1000 by Xi Perry RN  Activity Management:   up in chair   ambulated in room  Taken 4/9/2023 0800 by Xi Perry RN  Activity Management: activity encouraged  VTE Prevention/Management:   bilateral   sequential compression devices on  Range of Motion: active ROM (range of motion) encouraged  Intervention: Prevent  Infection  Recent Flowsheet Documentation  Taken 4/9/2023 0800 by Xi Perry RN  Infection Prevention:   single patient room provided   hand hygiene promoted  Goal: Optimal Comfort and Wellbeing  Outcome: Ongoing, Progressing  Intervention: Monitor Pain and Promote Comfort  Recent Flowsheet Documentation  Taken 4/9/2023 1600 by Xi Perry RN  Pain Management Interventions:   position adjusted   pillow support provided   see MAR  Taken 4/9/2023 1141 by Xi Perry RN  Pain Management Interventions:   position adjusted   pillow support provided   see MAR  Intervention: Provide Person-Centered Care  Recent Flowsheet Documentation  Taken 4/9/2023 0800 by Xi Perry RN  Trust Relationship/Rapport:   care explained   questions answered   thoughts/feelings acknowledged   emotional support provided  Goal: Readiness for Transition of Care  Outcome: Ongoing, Progressing     Problem: Diabetes Comorbidity  Goal: Blood Glucose Level Within Targeted Range  Outcome: Ongoing, Progressing  Intervention: Monitor and Manage Glycemia  Recent Flowsheet Documentation  Taken 4/9/2023 0800 by Xi Perry RN  Glycemic Management: blood glucose monitored     Problem: Hypertension Comorbidity  Goal: Blood Pressure in Desired Range  Outcome: Ongoing, Progressing  Intervention: Maintain Blood Pressure Management  Recent Flowsheet Documentation  Taken 4/9/2023 0800 by Xi Perry RN  Medication Review/Management: medications reviewed     Problem: Fall Injury Risk  Goal: Absence of Fall and Fall-Related Injury  Outcome: Ongoing, Progressing  Intervention: Identify and Manage Contributors  Recent Flowsheet Documentation  Taken 4/9/2023 0800 by Xi Perry RN  Medication Review/Management: medications reviewed  Self-Care Promotion: independence encouraged  Intervention: Promote Injury-Free Environment  Recent Flowsheet Documentation  Taken 4/9/2023 1800 by Xi Perry RN  Safety Promotion/Fall Prevention:  safety round/check completed  Taken 4/9/2023 1700 by Xi Perry RN  Safety Promotion/Fall Prevention: safety round/check completed  Taken 4/9/2023 1600 by Xi Perry RN  Safety Promotion/Fall Prevention: safety round/check completed  Taken 4/9/2023 1500 by Xi Perry RN  Safety Promotion/Fall Prevention: safety round/check completed  Taken 4/9/2023 1400 by Xi Perry RN  Safety Promotion/Fall Prevention: safety round/check completed  Taken 4/9/2023 1300 by Xi Perry RN  Safety Promotion/Fall Prevention: safety round/check completed  Taken 4/9/2023 1100 by Xi Perry RN  Safety Promotion/Fall Prevention: safety round/check completed  Taken 4/9/2023 1000 by Xi Perry RN  Safety Promotion/Fall Prevention: safety round/check completed  Taken 4/9/2023 0900 by Xi Perry RN  Safety Promotion/Fall Prevention: safety round/check completed  Taken 4/9/2023 0800 by Xi Perry RN  Safety Promotion/Fall Prevention: safety round/check completed     Problem: Noninvasive Ventilation Acute  Goal: Effective Unassisted Ventilation and Oxygenation  Outcome: Ongoing, Progressing     Problem: Skin Injury Risk Increased  Goal: Skin Health and Integrity  Outcome: Ongoing, Progressing  Intervention: Optimize Skin Protection  Recent Flowsheet Documentation  Taken 4/9/2023 1800 by Xi Perry RN  Head of Bed (HOB) Positioning: HOB at 30-45 degrees  Taken 4/9/2023 1700 by Xi Perry RN  Head of Bed (HOB) Positioning: HOB at 20-30 degrees  Taken 4/9/2023 1600 by Xi Perry RN  Head of Bed (HOB) Positioning: HOB at 20-30 degrees  Taken 4/9/2023 1500 by Xi Perry RN  Head of Bed (HOB) Positioning: HOB at 20-30 degrees  Taken 4/9/2023 1400 by Xi Perry RN  Head of Bed (HOB) Positioning: HOB at 20-30 degrees  Taken 4/9/2023 1300 by Xi Perry RN  Head of Bed (HOB) Positioning: HOB at 20-30 degrees  Taken 4/9/2023 1200 by Xi Perry RN  Head of Bed  ([facility]) Positioning: HOB at 30-45 degrees  Taken 4/9/2023 0800 by Xi Perry, RN  Pressure Reduction Techniques:   frequent weight shift encouraged   heels elevated off bed  Head of Bed ([facility]) Positioning: [facility] at 30-45 degrees  Pressure Reduction Devices: pressure-redistributing mattress utilized  Skin Protection:   incontinence pads utilized   skin-to-device areas padded

## 2023-04-09 NOTE — PLAN OF CARE
Goal Outcome Evaluation:  Plan of Care Reviewed With: patient        Progress: improving  Outcome Evaluation: Bed mobility SPV and sit to stand CGA. Static standing CGA. amb 3' to chair CGA. limited mobility due to not being able to wear helmet. Pt is eager to start amb.

## 2023-04-09 NOTE — THERAPY TREATMENT NOTE
Acute Care - Physical Therapy Treatment Note  [facility]     Patient Name: Elijah Escobar  : 1955  MRN: [id number]  Today's Date: 2023      Visit Dx:     ICD-10-CM ICD-9-CM   1. S/P craniotomy  Z98.890 V45.89   2. Impaired mobility  Z74.09 799.89   3. Infection of deep incisional surgical site after procedure, initial encounter  T81.42XA 998.59     Patient Active Problem List   Diagnosis   • Meningioma s/p craniotomy   • Hypertension   • GERD (gastroesophageal reflux disease)   • Coronary artery disease   • Class 2 severe obesity due to excess calories with serious comorbidity and body mass index (BMI) of 38.0 to 38.9 in adult   • Type 2 diabetes mellitus with hyperglycemia and peripheral neuropathy, with long-term current use of insulin (HCC)   • Leukocytosis   • Other specified anemias   • Paroxysmal atrial fibrillation with rapid ventricular response   • Post-operative infection   • Smoker   • History of cranioplasty   • Facial edema   • Swelling of scalp   • Acute pulmonary edema due to A-fib RVR   • Acute respiratory failure with hypoxia   • Type 2 myocardial infarction due to arrhythmia   • COPD (chronic obstructive pulmonary disease)   • Obesity (BMI 30-39.9)   • Tobacco abuse, in remission   • Acute systolic heart failure   • ADDIE (obstructive sleep apnea)     Past Medical History:   Diagnosis Date   • Brain tumor    • Coronary artery disease    • COVID-19 vaccine series completed     MODERNA X 3; LAST DOSE 3/2022   • Diabetes    • Erectile dysfunction    • GERD (gastroesophageal reflux disease)    • Hypercholesteremia    • Hypertension    • Seizure      Past Surgical History:   Procedure Laterality Date   • BALLOON ANGIOPLASTY, ARTERY Right    • COLONOSCOPY     • CRANIECTOMY Right 2022    Procedure: CRANIECTOMY WITH INCISIONAL WASHOUT;  Surgeon: Felipe Banerjee MD;  Location: [facility];  Service: Neurosurgery;  Laterality: Right;   • CRANIOPLASTY Right 10/4/2022    Procedure: CRANIOPLASTY  right with Lumbar drain;  Surgeon: Flaco Portillo MD;  Location:  [facility];  Service: Neurosurgery;  Laterality: Right;   • CRANIOPLASTY N/A 4/4/2023    Procedure: CRANIOPLASTY, removal, right, horseshoe;  Surgeon: Flaco Portillo MD;  Location:  [facility];  Service: Neurosurgery;  Laterality: N/A;   • CRANIOTOMY FOR TUMOR Right 6/16/2022    Procedure: CRANIOTOMY FOR TUMOR STERIOTACTIC WITH BRAIN LAB, right;  Surgeon: Flaco Portillo MD;  Location:  [facility];  Service: Neurosurgery;  Laterality: Right;     PT Assessment (last 12 hours)     PT Evaluation and Treatment     Row Name 04/09/23 0923          Physical Therapy Time and Intention    Subjective Information complains of  not feeling well  -WK     Document Type therapy note (daily note)  -WK     Mode of Treatment physical therapy  -WK     Comment limited activity due to not be able to don helmet  -WK     Row Name 04/09/23 0923          General Information    Existing Precautions/Restrictions fall;oxygen therapy device and L/min  helmet on AAT  -WK     Row Name 04/09/23 0923          Bed Mobility    Supine-Sit [place] (Bed Mobility) supervision  -WK     Row Name 04/09/23 0923          Sit-Stand Transfer    Sit-Stand [place] (Transfers) verbal cues;contact guard  -WK     Row Name 04/09/23 0923          Stand-Sit Transfer    Stand-Sit [place] (Transfers) verbal cues;contact guard  -WK     Row Name 04/09/23 0923          Gait/Stairs (Locomotion)    [place] Level (Gait) verbal cues;contact guard  -WK     Distance in Feet (Gait) 3' to chair  -WK     Deviations/Abnormal Patterns (Gait) base of support, wide  -WK     Bilateral Gait Deviations heel strike decreased  -WK     Row Name 04/09/23 0923          Balance    Comment, Balance static standing CGA 2 minutes due to mild dizziness.  -WK     Row Name 04/09/23 0923          Aerobic Exercise    Comment, Aerobic Exercise (Therapeutic Exercise) AROM BLE with isometic hold for 3  seconds on each reps.20 reps  -WK     Row Name             Wound 04/04/23 1111 Right temporal region Incision    Wound - Properties Group Placement Date: 04/04/23  -CHRISTIANO Placement Time: 1111  -CHRISTIANO Side: Right  -CHRISTIANO Location: temporal region  -CHRISTIANO Primary Wound Type: Incision  -CHRISTIANO    Retired Wound - Properties Group Placement Date: 04/04/23  -CHRISTIANO Placement Time: 1111  -CHRISTIANO Side: Right  -CHRISTIANO Location: temporal region  -CHRISTIANO Primary Wound Type: Incision  -CHRISTIANO    Retired Wound - Properties Group Date first assessed: 04/04/23  -CHRISTIANO Time first assessed: 1111  -CHRISTIANO Side: Right  -CHRISTIANO Location: temporal region  -CHRISTIANO Primary Wound Type: Incision  -CHRISTIANO    Row Name 04/09/23 0923          Plan of Care Review    Plan of Care Reviewed With patient  -WK     Outcome Evaluation Bed mobility SPV and sit to stand CGA. Static standing CGA. amb 3' to chair CGA. limited mobility due to not being able to wear helmet. Pt is eager to start amb.  -WK     Row Name 04/09/23 0923          Positioning and Restraints    Pre-Treatment Position in bed  -WK     Post Treatment Position chair  -WK     In Chair sitting;call light within reach;encouraged to call for assist;with nsg  -WK           User Key  (r) = Recorded By, (t) = Taken By, (c) = Cosigned By    Initials Name Provider Type    Ricky George, RN Registered Nurse    Genesis Steward PTA Physical Therapist Assistant                Physical Therapy Education     Title: PT OT SLP Therapies (Done)     Topic: Physical Therapy (Done)     Point: Mobility training (Done)     Learning Progress Summary           Patient Acceptance, E,TB, VU by CM at 4/6/2023 1350    Acceptance, E, VU by SB at 4/5/2023 1605    Comment: helmet fit, safety, POC    Acceptance, E, VU by CHRISTIANO at 4/3/2023 1353    Comment: gait, safety,    Acceptance, E, VU by SB at 4/1/2023 1405    Comment: pt edu on POC, benefits of act, PLB and d/c plans   Family Acceptance, E,TB, VU by CM at 4/6/2023 1350                   Point: Home exercise  program (Done)     Learning Progress Summary           Patient Acceptance, E,TB, VU by CM at 4/6/2023 1350   Family Acceptance, E,TB, VU by CM at 4/6/2023 1350                   Point: Body mechanics (Done)     Learning Progress Summary           Patient Acceptance, E,TB, VU by CM at 4/6/2023 1350   Family Acceptance, E,TB, VU by CM at 4/6/2023 1350                   Point: Precautions (Done)     Learning Progress Summary           Patient Acceptance, E,TB, VU by CM at 4/6/2023 1350    Acceptance, E, VU by SB at 4/5/2023 1605    Comment: helmet fit, safety, POC    Acceptance, E, VU by SB at 4/1/2023 1405    Comment: pt edu on POC, benefits of act, PLB and d/c plans   Family Acceptance, E,TB, VU by CM at 4/6/2023 1350                               User Key     Initials Effective Dates Name Provider Type Discipline    CHRISTIANO 02/03/23 -  Александр Saravia PTA Physical Therapist Assistant PT    CM 03/02/23 -  Dianelys Carlton RN Registered Nurse Nurse    SB 06/16/21 -  Erika Javier, BELEN DPT Physical Therapist PT              PT Recommendation and Plan     Plan of Care Reviewed With: patient  Progress: improving  Outcome Evaluation: Bed mobility SPV and sit to stand CGA. Static standing CGA. amb 3' to chair CGA. limited mobility due to not being able to wear helmet. Pt is eager to start amb.   Outcome Measures     Row Name 04/09/23 0923 04/08/23 0906 04/07/23 0822       How much help from another person do you currently need...    Turning from your back to your side while in flat bed without using bedrails? 4  -WK 4  -WK 4  -TB    Moving from lying on back to sitting on the side of a flat bed without bedrails? 4  -WK 4  -WK 4  -TB    Moving to and from a bed to a chair (including a wheelchair)? 3  -WK 3  -WK --  Defer right now  -TB    Standing up from a chair using your arms (e.g., wheelchair, bedside chair)? 4  -WK 4  -WK --  Defer right now  -TB    Climbing 3-5 steps with a railing? 3  -WK 3  -WK --  Defer right now   -TB    To walk in hospital room? 3  -WK 3  -WK --  Defer right now  -TB    AM-PAC 6 Clicks Score (PT) 21  -WK 21  -WK --       Functional Assessment    Outcome Measure Options AM-PAC 6 Clicks Basic Mobility (PT)  -WK AM-PAC 6 Clicks Basic Mobility (PT)  -WK AM-PAC 6 Clicks Basic Mobility (PT)  -TB          User Key  (r) = Recorded By, (t) = Taken By, (c) = Cosigned By    Initials Name Provider Type    WK Genesis Root PTA Physical Therapist Assistant    Tere Berger PTA Physical Therapist Assistant                 Time Calculation:    PT Charges     Row Name 04/09/23 1001             Time Calculation    Start Time 0923  -WK      Stop Time 0953  -WK      Time Calculation (min) 30 min  -WK      PT Received On 04/09/23  -WK         Time Calculation- PT    Total Timed Code Minutes- PT 30 minute(s)  -WK            User Key  (r) = Recorded By, (t) = Taken By, (c) = Cosigned By    Initials Name Provider Type    WK Genesis Root PTA Physical Therapist Assistant              Therapy Charges for Today     Code Description Service Date Service Provider Modifiers Qty    40309123527 HC PT THERAPEUTIC ACT EA 15 MIN 4/8/2023 Genesis Root, [place] GP 2    58399382853 HC PT THER PROC EA 15 MIN 4/8/2023 Genesis Root, [place] GP 1    89434540214 HC PT THERAPEUTIC ACT EA 15 MIN 4/9/2023 Genesis Root, [place] GP 1    99190512526 HC PT THER PROC EA 15 MIN 4/9/2023 Genesis Root, [place] GP 1          PT G-Codes  Outcome Measure Options: AM-PAC 6 Clicks Basic Mobility (PT)  AM-PAC 6 Clicks Score (PT): 21  AM-PAC 6 Clicks Score (OT): 22    Genesis Root PTA  4/9/2023

## 2023-04-09 NOTE — PROGRESS NOTES
Neurosurgery Daily Progress Note    HPI:  Elijah Escobar is a 67 y.o. male with a significant medical history of hypertension, diabetes, hyperlipidemia, seizures, craniotomy for tumor (6/16/2022), craniotomy for washout (7/1/2022), craniectomy (10/4/2022), coronary artery disease, diabetes, erectile dysfunction, GERD, hypertension, hyperlipidemia, tobacco abuse, and obesity.  He presents today with a complaint of a 4-5 days onset of progressively worsening right frontal, temporal, and periorbital edema and associated symptoms to include, but not limited to generalized fatigue, chills, dyspnea, intermittent nausea without vomiting, and states he's felt feverish.  Physical exam findings of neurologically intact with right frontal, temporal, and periorbital swelling.  WBC elevated at 15.  3 to an CRP elevated at 14.75.  Imaging pending.    Assessment:   Past Medical History:   Diagnosis Date   • Brain tumor    • Coronary artery disease    • COVID-19 vaccine series completed     MODERNA X 3; LAST DOSE 3/2022   • Diabetes    • Erectile dysfunction    • GERD (gastroesophageal reflux disease)    • Hypercholesteremia    • Hypertension    • Seizure      Active Hospital Problems    Diagnosis    • **Swelling of scalp    • COPD (chronic obstructive pulmonary disease)    • Obesity (BMI 30-39.9)    • Tobacco abuse, in remission    • Acute systolic heart failure    • ADDIE (obstructive sleep apnea)    • Type 2 myocardial infarction due to arrhythmia    • Acute pulmonary edema due to A-fib RVR    • Acute respiratory failure with hypoxia    • Post-operative infection    • Paroxysmal atrial fibrillation with rapid ventricular response    • Type 2 diabetes mellitus with hyperglycemia and peripheral neuropathy, with long-term current use of insulin (HCC)    • Coronary artery disease    • Meningioma s/p craniotomy      Plan:   Neuro: Stable, intact/at baseline  Infected cranial flap      5 Days Post-Op (4/4/2023)  craniectomy and  "washout   Postop CT stable. No acute abnormalities.   Flap tapped I&D cultures 4+ gram-negative bacilli    Heavy 4+ Serratia marcescens   ID consulted.  Appreciate assistance.  JUAN = 25 mL, keep  Remain in ICU for close neurologic monitoring  Continue neuro exams per policy.  Call for decline    CV: AFib with RVR and heart strain and trop bump.  Stabilized    Appreciate cardiology  Pulm: Chest x-ray cleared  :  Voiding.   FEN: Reg diet for now   Fluid balance for sepsis vs pulmonary edema   Carb consistent diet  Endocrine: Glucose improving.  Continue Glucomander protocol   GI: SHERIF.  +BM  ID: WBC improving, CRP and ESR elevated   Infected cranial flap  CSF: no growth to date   PNA.  Resolved   UTI.  Resolved   Continue meropenem per ID.  Appreciate assist  Heme:  DVT prophylaxis  Pain: Tolerable at present  Dispo: PT/OT    DC pending insurance acceptance to LTAC.  Patient agreeable     Chief complaint:   4-5 days onset of progressively worsening right frontal, temporal, and periorbital edema and associated symptoms to include, but not limited to generalized fatigue, chills, dyspnea, intermittent nausea without vomiting, and states he's felt feverish.    HPI  Subjective  Doing well    Temp:  [97.8 °F (36.6 °C)-98.2 °F (36.8 °C)] 97.9 °F (36.6 °C)  Heart Rate:  [] 99  Resp:  [12-18] 16  BP: ()/(63-95) 128/74    Output by Drain (mL) 04/08/23 0701 - 04/08/23 1900 04/08/23 1901 - 04/09/23 0700 04/09/23 0701 - 04/09/23 1150 Range Total   Closed/Suction Drain 1 Right Scalp Bulb 7 Fr. 20 5  25     Objective:  Vital signs: (most recent): Blood pressure 128/74, pulse 99, temperature 97.9 °F (36.6 °C), temperature source Oral, resp. rate 16, height 177.8 cm (70\"), weight 121 kg (267 lb 9.6 oz), SpO2 99 %.      Neurologic Exam     Mental Status   Oriented to person, place, and time.   Speech: speech is normal   Level of consciousness: alert    Oriented x3.  Follows commands without prompting showing thumbs up and 2 " fingers bilaterally.     Cranial Nerves     CN III, IV, VI   Pupils are equal, round, and reactive to light.  Extraocular motions are normal.     CN V   Facial sensation intact.     CN VII   Facial expression full, symmetric.     Motor Exam   Right arm pronator drift: absent  Left arm pronator drift: absent    Strength   Right deltoid: 5/5  Left deltoid: 5/5  Right biceps: 5/5  Left biceps: 5/5  Right triceps: 5/5  Left triceps: 5/5  Right iliopsoas: 5/5  Left iliopsoas: 5/5  Right quadriceps: 5/5  Left quadriceps: 5/5  Right gastroc: 5/5  Left gastroc: 5/5  Moves all extremities equal and symmetric     Sensory Exam   Light touch normal.     Drains: * No LDAs found *    Imaging Results (Last 24 Hours)     ** No results found for the last 24 hours. **        Lab Results (last 24 hours)     Procedure Component Value Units Date/Time    POC Glucose Once [[id number]]  (Abnormal) Collected: 04/09/23 1120    Specimen: Blood Updated: 04/09/23 1131     Glucose 174 mg/dL      Comment: : [id number] Orlando ShannonMeter ID: [id number]       POC Glucose Once [[id number]]  (Normal) Collected: 04/09/23 0755    Specimen: Blood Updated: 04/09/23 0807     Glucose 75 mg/dL      Comment: : [id number] Jayson PurvisoryMeter ID: [id number]       Basic Metabolic Panel [[id number]]  (Abnormal) Collected: 04/09/23 0317    Specimen: Blood Updated: 04/09/23 0359     Glucose 152 mg/dL      BUN 24 mg/dL      Creatinine 0.96 mg/dL      Sodium 140 mmol/L      Potassium 3.8 mmol/L      Chloride 105 mmol/L      CO2 28.0 mmol/L      Calcium 7.7 mg/dL      BUN/Creatinine Ratio 25.0     Anion Gap 7.0 mmol/L      eGFR 86.6 mL/min/1.73     Narrative:      GFR Normal >60  Chronic Kidney Disease <60  Kidney Failure <15      CBC (No Diff) [[id number]]  (Abnormal) Collected: 04/09/23 0317    Specimen: Blood Updated: 04/09/23 0340     WBC 14.47 10*3/mm3      RBC 3.55 10*6/mm3      Hemoglobin 10.8 g/dL      Hematocrit 33.3 %      MCV 93.8 fL      MCH 30.4 pg       MCHC 32.4 g/dL      RDW 12.4 %      RDW-SD 42.9 fl      MPV 10.8 fL      Platelets 229 10*3/mm3     POC Glucose Once [[id number]]  (Abnormal) Collected: 04/08/23 2057    Specimen: Blood Updated: 04/08/23 2118     Glucose 175 mg/dL      Comment: : [id number] Juan José KristiMeter ID: [id number]       POC Glucose Once [[id number]]  (Abnormal) Collected: 04/08/23 1708    Specimen: Blood Updated: 04/08/23 1719     Glucose 179 mg/dL      Comment: : [id number] Perry ShannonMeter ID: [id number]       POC Glucose Once [[id number]]  (Abnormal) Collected: 04/08/23 1201    Specimen: Blood Updated: 04/08/23 1212     Glucose 173 mg/dL      Comment: : [id number] Jayson PurvisoryMeter ID: [id number]           Rahul Arnold, APRN

## 2023-04-09 NOTE — PROGRESS NOTES
"Infectious Diseases Progress Note    Patient:  Elijah Escobar  YOB: 1955  MRN: [id number]   Admit date: 3/31/2023   Admitting Physician: Flaco Portillo, *  Primary Care Physician: Brianna Martinez APRN    Chief Complaint/Interval History: He is comfortable.  No headache.  No new neurological complaints.  No nausea.  No fever.  No diarrhea or rash.    Intake/Output Summary (Last 24 hours) at 4/9/2023 0918  Last data filed at 4/9/2023 0800  Gross per 24 hour   Intake 880 ml   Output 1690 ml   Net -810 ml     Allergies: No Known Allergies  Current Scheduled Medications:   aspirin, 81 mg, Oral, Daily  atorvastatin, 20 mg, Oral, Nightly  budesonide-formoterol, 2 puff, Inhalation, BID - RT  digoxin, 125 mcg, Oral, Daily  dilTIAZem CD, 240 mg, Oral, Q24H  enoxaparin, 1 mg/kg, Subcutaneous, Q12H  furosemide, 40 mg, Oral, Daily  gabapentin, 300 mg, Oral, TID  insulin detemir, 30 Units, Subcutaneous, Q12H  insulin lispro, 0-14 Units, Subcutaneous, TID AC  ipratropium, 0.5 mg, Nebulization, 4x Daily - RT  levETIRAcetam, 500 mg, Oral, BID  meropenem, 1 g, Intravenous, Q8H  methylnaltrexone, 12 mg, Subcutaneous, Every Other Day  metoprolol tartrate, 50 mg, Oral, Q12H  pantoprazole, 40 mg, Oral, Q AM  polyethylene glycol, 17 g, Oral, Daily  potassium chloride, 20 mEq, Oral, Daily  sennosides-docusate, 1 tablet, Oral, Daily  sodium chloride, 10 mL, Intravenous, Q12H      Current PRN Medications:  •  acetaminophen **OR** acetaminophen **OR** acetaminophen  •  butalbital-acetaminophen-caffeine  •  dextrose  •  dextrose  •  glucagon (human recombinant)  •  nicotine  •  ondansetron **OR** ondansetron  •  oxyCODONE-acetaminophen  •  oxyCODONE-acetaminophen  •  Pharmacy to Dose enoxaparin (LOVENOX)  •  sodium chloride  •  sodium chloride    Review of Systems see HPI    Vital Signs:  /69   Pulse 106   Temp 97.9 °F (36.6 °C) (Oral)   Resp 14   Ht 177.8 cm (70\")   Wt 121 kg (267 lb 9.6 oz)   SpO2 90%  "  BMI 38.40 kg/m²     Physical Exam  Vital signs - reviewed.  Line/IV site - No erythema, warmth, induration, or tenderness.  Lungs without wheezing  Abdomen is soft and nontender  Neurological examination-cranial nerves II through XII intact.  Motor strength 5 out of 5 in upper and lower extremities.    Lab Results:  CBC:   Results from last 7 days   Lab Units 04/09/23  0317 04/07/23  0249 04/06/23  0352 04/04/23  0805 04/03/23  0436   WBC 10*3/mm3 14.47* 17.47* 13.39* 14.40* 11.00*   HEMOGLOBIN g/dL 10.8* 11.0* 10.7* 11.6* 11.0*   HEMATOCRIT % 33.3* 34.5* 31.6* 34.8* 34.5*   PLATELETS 10*3/mm3 229 246 220 234 188     BMP:  Results from last 7 days   Lab Units 04/09/23  0317 04/07/23  0249 04/06/23  0831 04/06/23  0352 04/05/23  1939 04/05/23  1519 04/05/23  1207 04/05/23  0304 04/04/23  0745 04/03/23  0436   SODIUM mmol/L 140 137 137 136 135* 134* 132*   < > 136 139   POTASSIUM mmol/L 3.8 4.0 3.4* 3.5 3.5 3.6 3.5   < > 3.3* 3.6   CHLORIDE mmol/L 105 102 102 102 99 97* 96*   < > 98 102   CO2 mmol/L 28.0 23.0 25.0 23.0 23.0 23.0 24.0   < > 21.0* 23.0   BUN mg/dL 24* 41* 40* 43* 46* 45* 47*   < > 42* 36*   CREATININE mg/dL 0.96 1.14 1.28* 1.19 1.50* 1.49* 1.46*   < > 1.36* 1.16   GLUCOSE mg/dL 152* 240* 178* 159* 205* 331* 436*   < > 343* 102*   CALCIUM mg/dL 7.7* 8.1* 7.9* 7.9* 8.0* 8.0* 7.9*   < > 8.3* 8.6   ALT (SGPT) U/L  --   --   --   --   --   --   --   --  10 11    < > = values in this interval not displayed.     Culture Results:   Respiratory Culture   Date Value Ref Range Status   04/03/2023 Rejected  Final     Radiology: None  Additional Studies Reviewed: None    Impression:   Infection following right frontal cranioplasty.  He has had flap removed.  He underwent irrigation debridement.  He is postop day #9.    Recommendations:   Continue meropenem  Planning IV antibiotic treatment through April 20   evaluating for LTAC or skilled nursing facility  We will transition to oral antibiotic  treatment following completion of his IV antibiotic therapy-plan treatment with levofloxacin or Bactrim  Continue to follow    Edwin Jeffers MD

## 2023-04-09 NOTE — PROGRESS NOTES
[facility]  INPATIENT PROGRESS NOTE    Patient Name: Elijah Escobar  Date of Admission: 3/31/2023  Today's Date: 04/09/23  Length of Stay: 9  Primary Care Physician: Brianna Martinez APRN    Subjective   Chief Complaint: Consulted for medical management.   HPI   Blood sugars better.     Remains focused on discharge. LTAC likely.     Review of Systems   All pertinent negatives and positives are as above. All other systems have been reviewed and are negative unless otherwise stated.     Objective    Temp:  [97.8 °F (36.6 °C)-98.2 °F (36.8 °C)] 97.9 °F (36.6 °C)  Heart Rate:  [] 88  Resp:  [12-18] 16  BP: ()/(63-95) 135/75  Physical Exam  Constitutional:       Appearance: He is obese. He is ill-appearing (chronically).      Comments: Up in bed.  No distress.  No family present.  Discussed with his nurse, Oh GALLARDOT:      Head: Normocephalic.      Comments: Bandaging around his head. Drain to be pulled today per nursing.   Eyes:      Conjunctiva/sclera: Conjunctivae normal.      Pupils: Pupils are equal, round, and reactive to light.   Neck:      Vascular: No JVD.   Cardiovascular:      Rate and Rhythm: Normal rate. Rhythm irregular.      Heart sounds: Normal heart sounds.      Comments: In atrial fibrillation with rates in the 80s, 90s.   Pulmonary:      Effort: Pulmonary effort is normal. No respiratory distress.      Breath sounds: No wheezing or rales.      Comments: On 2L.   Abdominal:      General: Bowel sounds are normal. There is no distension.      Palpations: Abdomen is soft.      Tenderness: There is no abdominal tenderness.   Musculoskeletal:         General: Swelling (trace) present. No tenderness or deformity. Normal range of motion.   Skin:     General: Skin is warm and dry.      Findings: No rash.   Neurological:      Mental Status: He is alert and oriented to person, place, and time.      Cranial Nerves: No cranial nerve deficit.       Motor: No abnormal muscle tone.      Deep Tendon Reflexes: Reflexes normal.   Psychiatric:         Mood and Affect: Mood normal.         Behavior: Behavior normal.         Thought Content: Thought content normal.         Judgment: Judgment normal.         Intake/Output Summary (Last 24 hours) at 4/9/2023 0729  Last data filed at 4/9/2023 0300  Gross per 24 hour   Intake 980 ml   Output 1925 ml   Net -945 ml     Results Review:  I have reviewed the labs, radiology results, and diagnostic studies.    Laboratory Data:   Results from last 7 days   Lab Units 04/09/23  0317 04/07/23  0249 04/06/23  0352   WBC 10*3/mm3 14.47* 17.47* 13.39*   HEMOGLOBIN g/dL 10.8* 11.0* 10.7*   HEMATOCRIT % 33.3* 34.5* 31.6*   PLATELETS 10*3/mm3 229 246 220     Results from last 7 days   Lab Units 04/09/23 0317 04/07/23  0249 04/06/23  0831 04/05/23  0304 04/04/23  0745 04/03/23  0436   SODIUM mmol/L 140 137 137   < > 136 139   POTASSIUM mmol/L 3.8 4.0 3.4*   < > 3.3* 3.6   CHLORIDE mmol/L 105 102 102   < > 98 102   CO2 mmol/L 28.0 23.0 25.0   < > 21.0* 23.0   BUN mg/dL 24* 41* 40*   < > 42* 36*   CREATININE mg/dL 0.96 1.14 1.28*   < > 1.36* 1.16   CALCIUM mg/dL 7.7* 8.1* 7.9*   < > 8.3* 8.6   BILIRUBIN mg/dL  --   --   --   --  0.5 0.7   ALK PHOS U/L  --   --   --   --  85 89   ALT (SGPT) U/L  --   --   --   --  10 11   AST (SGOT) U/L  --   --   --   --  13 22   GLUCOSE mg/dL 152* 240* 178*   < > 343* 102*    < > = values in this interval not displayed.     I have reviewed the patient's current medications.     Assessment/Plan   Assessment  Active Hospital Problems    Diagnosis    • **Swelling of scalp    • Post-operative infection    • Meningioma s/p craniotomy    • Type 2 diabetes mellitus with hyperglycemia and peripheral neuropathy, with long-term current use of insulin (HCC)    • Acute systolic heart failure    • Type 2 myocardial infarction due to arrhythmia    • Acute pulmonary edema due to A-fib RVR    • Paroxysmal atrial  fibrillation with rapid ventricular response    • Coronary artery disease    • COPD (chronic obstructive pulmonary disease)    • Obesity (BMI 30-39.9)    • Tobacco abuse, in remission    • ADDIE (obstructive sleep apnea)    • Acute respiratory failure with hypoxia      Treatment Plan  The patient was admitted on 3/31 by Dr. Portillo with the neurosurgical service.  He has a history of craniotomy in June 2022 that became infected and required washout in July 2022.  He then required a craniectomy in October 2022.  He presented and had a right frontal scalp fluid collection.  Spinal fluid was clean.  Surgical intervention was planned, but the patient had other issues going on including uncontrolled hyperglycemia and atrial fibrillation with rapid ventricular response.  Hospital medicine was consulted to assist with the above.    The scalp was also aspirated and he ultimately grew Serratia.  This was resistant to tetracycline.  He has been receiving vancomycin and Zosyn.  I de-escalated his antibiotic coverage to ceftriaxone monotherapy on 4/4. He underwent I&D on 4/4 with Dr. Portillo. Dr. Santos with ID is now seeing and has placed him on Merrem for now. Case discussed with him recently.     He has been seen by cardiology.  He has had some difficulties with paroxysmal atrial fibrillation with rapid ventricular response.  He is on therapeutic Lovenox and aspirin.  Efforts have been made at rate control with Cardizem CD and digoxin. Echocardiogram showed an EF of 46-50%.  He also had some hypokinesis of some segments.  Study was also technically difficult.  Currently on oral Lasix.  Not presently on ACE/ARB.  Not presently on Toprol-XL/carvedilol, but metoprolol tartrate.    He has been seen by pulmonology.  His pulmonary infiltrates are likely more secondary to CHF than pneumonia.  He has been on antibiotics to cover his scalp infection.  Pulmonology also had him on IV steroids recently (which exacerbated his blood  sugars).  Continue efforts at pulmonary toilet.  Wean oxygen as tolerated. Back on 2L after surgery.     He has a history of diabetes.  His hemoglobin A1c was 11.0 in October 2022 and is 11.0 now. He is on Levemir in the home setting.  He at one point was on an insulin drip and then placed on one again by NS on 4/5.  Steroids have finally been stopped as of 4/5.  Transitioned back to basal bolus insulin on 4/6.  Most recent glucoses 179, 175, 152.      DVT prophylaxis is achieved by being on therapeutic Lovenox.    Medical Decision Making  Number and Complexity of problems: The main issue requiring hospitalization was 1 acute, moderately complex problem in the form of his scalp infection where he has previously had craniotomy to remove any meningioma.  Differential Diagnosis: Superficial versus deeper infection.    Conditions and Status        Condition is improving.     [facility] Data  External documents reviewed: Reviewed prior Epic records.  Cardiac tracing (EKG, telemetry) interpretation: Continues in atrial fibrillation on telemetry at present.  Radiology interpretation: No recent studies.  Reviewed admission studies.  Labs reviewed: As above.  Any tests that were considered but not ordered: None considered at present.     Decision rules/scores evaluated (example [id number], Wells, etc): [id number] score is 4.     Discussed with: The patient and his nurse, Yaniv.      Care Planning  Shared decision making: Consented to admission for further treatment.  Code status and discussions: Full with full interventions.    Disposition  Social Determinants of Health that impact treatment or disposition: Nothing negative identified at present.  I expect the patient to be discharged to per the neurosurgical service.  Possible LTAC placement.     Electronically signed by Joey Moore DO, 04/09/23, 07:29 CDT.

## 2023-04-10 NOTE — PROGRESS NOTES
Neurosurgery Daily Progress Note    HPI:  Elijah Escobar is a 67 y.o. male with a significant medical history of hypertension, diabetes, hyperlipidemia, seizures, craniotomy for tumor (6/16/2022), craniotomy for washout (7/1/2022), craniectomy (10/4/2022), coronary artery disease, diabetes, erectile dysfunction, GERD, hypertension, hyperlipidemia, tobacco abuse, and obesity.  He presents today with a complaint of a 4-5 days onset of progressively worsening right frontal, temporal, and periorbital edema and associated symptoms to include, but not limited to generalized fatigue, chills, dyspnea, intermittent nausea without vomiting, and states he's felt feverish.  Physical exam findings of neurologically intact with right frontal, temporal, and periorbital swelling.  WBC elevated at 15.  3 to an CRP elevated at 14.75.  Imaging pending.    Assessment:   Past Medical History:   Diagnosis Date   • Brain tumor    • Coronary artery disease    • COVID-19 vaccine series completed     MODERNA X 3; LAST DOSE 3/2022   • Diabetes    • Erectile dysfunction    • GERD (gastroesophageal reflux disease)    • Hypercholesteremia    • Hypertension    • Seizure      Active Hospital Problems    Diagnosis    • **Swelling of scalp    • COPD (chronic obstructive pulmonary disease)    • Obesity (BMI 30-39.9)    • Tobacco abuse, in remission    • Acute systolic heart failure    • ADDIE (obstructive sleep apnea)    • Type 2 myocardial infarction due to arrhythmia    • Acute pulmonary edema due to A-fib RVR    • Acute respiratory failure with hypoxia    • Post-operative infection    • Paroxysmal atrial fibrillation with rapid ventricular response    • Type 2 diabetes mellitus with hyperglycemia and peripheral neuropathy, with long-term current use of insulin (HCC)    • Coronary artery disease    • Meningioma s/p craniotomy      Plan:   Neuro: Stable, intact/at baseline  Infected cranial flap      6 Days Post-Op (4/4/2023)  craniectomy and  "washout   Postop CT stable. No acute abnormalities.   Flap tapped I&D cultures 4+ gram-negative bacilli    Heavy 4+ Serratia marcescens   ID consulted.  Appreciate assistance.  JUAN = 15 mL, DC'd  Remain in ICU for close neurologic monitoring  Continue neuro exams per policy.  Call for decline    CV: AFib with RVR and heart strain and trop bump.  Stabilized    Appreciate cardiology  Pulm: Chest x-ray cleared  :  Voiding.   FEN: Reg diet for now   Fluid balance for sepsis vs pulmonary edema   Carb consistent diet  Endocrine: Glucose improving.  Continue Glucomander protocol   GI: SHERIF.  +BM  ID: WBC, CRP, and ESR improving   Infected cranial flap  CSF: no growth to date   PNA.  Resolved   UTI.  Resolved   Continue meropenem per ID.  Appreciate assist  Heme:  DVT prophylaxis  Pain: Tolerable at present  Dispo: PT/OT    DC pending insurance acceptance to LTAC.  Patient agreeable     Chief complaint:   4-5 days onset of progressively worsening right frontal, temporal, and periorbital edema and associated symptoms to include, but not limited to generalized fatigue, chills, dyspnea, intermittent nausea without vomiting, and states he's felt feverish.    HPI  Subjective  Doing well    Temp:  [97.1 °F (36.2 °C)-98.4 °F (36.9 °C)] 98.4 °F (36.9 °C)  Heart Rate:  [] 137  Resp:  [12-16] 15  BP: (116-159)/() 127/68    Output by Drain (mL) 04/09/23 0701 - 04/09/23 1900 04/09/23 1901 - 04/10/23 0700 04/10/23 0701 - 04/10/23 0801 Range Total   Closed/Suction Drain 1 Right Scalp Bulb 7 Fr. 10 5  15     Objective:  Vital signs: (most recent): Blood pressure 127/68, pulse (!) 137, temperature 98.4 °F (36.9 °C), temperature source Oral, resp. rate 15, height 177.8 cm (70\"), weight 121 kg (267 lb 9.6 oz), SpO2 100 %.      Neurologic Exam     Mental Status   Oriented to person, place, and time.   Speech: speech is normal   Level of consciousness: alert    Oriented x3.  Follows commands without prompting showing thumbs up and " 2 fingers bilaterally.     Cranial Nerves     CN III, IV, VI   Pupils are equal, round, and reactive to light.  Extraocular motions are normal.     CN V   Facial sensation intact.     CN VII   Facial expression full, symmetric.     Motor Exam   Right arm pronator drift: absent  Left arm pronator drift: absent    Strength   Right deltoid: 5/5  Left deltoid: 5/5  Right biceps: 5/5  Left biceps: 5/5  Right triceps: 5/5  Left triceps: 5/5  Right iliopsoas: 5/5  Left iliopsoas: 5/5  Right quadriceps: 5/5  Left quadriceps: 5/5  Right gastroc: 5/5  Left gastroc: 5/5  Moves all extremities equal and symmetric     Sensory Exam   Light touch normal.     Drains: * No LDAs found *    Imaging Results (Last 24 Hours)     ** No results found for the last 24 hours. **        Lab Results (last 24 hours)     Procedure Component Value Units Date/Time    C-reactive Protein [[id number]]  (Abnormal) Collected: 04/10/23 0003    Specimen: Blood Updated: 04/10/23 0058     C-Reactive Protein 1.44 mg/dL     Sedimentation Rate [[id number]]  (Abnormal) Collected: 04/10/23 0003    Specimen: Blood Updated: 04/10/23 0041     Sed Rate 33 mm/hr     POC Glucose Once [[id number]]  (Abnormal) Collected: 04/09/23 2050    Specimen: Blood Updated: 04/09/23 2101     Glucose 163 mg/dL      Comment: : [id number] Juan José KristiMeter ID: [id number]       POC Glucose Once [[id number]]  (Abnormal) Collected: 04/09/23 1716    Specimen: Blood Updated: 04/09/23 1726     Glucose 173 mg/dL      Comment: : [id number] Orlando ShannonMeter ID: [id number]       POC Glucose Once [[id number]]  (Abnormal) Collected: 04/09/23 1120    Specimen: Blood Updated: 04/09/23 1131     Glucose 174 mg/dL      Comment: : [id number] Orlando ShannonMeter ID: [id number]       POC Glucose Once [[id number]]  (Normal) Collected: 04/09/23 0755    Specimen: Blood Updated: 04/09/23 0807     Glucose 75 mg/dL      Comment: : [id number] Jayson PurvisoryMeter ID: [id number]           Rahul OLMEDO  [place], APRN

## 2023-04-10 NOTE — CASE MANAGEMENT/SOCIAL WORK
Continued Stay Note   [place]     Patient Name: Elijah Escobar  MRN: [id number]  Today's Date: 4/10/2023    Admit Date: 3/31/2023    Plan: Acute Rehab vs SNF vs Home infusion   Discharge Plan     Row Name 04/10/23 1520       Plan    Plan Acute Rehab vs SNF vs Home infusion    Plan Comments Patient's insurance has declined LTAC placement.  Patient will need IV antibiotics until 4/20.  Patient is doing very well with physical therapy and is very anxious to return home.  Acute rehab may be an option?    Row Name 04/10/23 1238       Plan    Plan LTAC referral pending insurance precert    Plan Comments Insurance precert pending approval for LTAC placement.               Discharge Codes    No documentation.                     TIANA Syed

## 2023-04-10 NOTE — PROGRESS NOTES
[facility] Medicine Services  INPATIENT PROGRESS NOTE    Patient Name: Elijah Escobar  Date of Admission: 3/31/2023  Today's Date: 04/10/23  Length of Stay: 10  Primary Care Physician: Brianna Martinez APRN    Subjective   Chief Complaint: No complaint  HPI     The patient is awake, alert and interactive this morning.  White blood cell count is downtrending with yesterday's count 14,500 with hemoglobin stable at 10.8.  Sed rate 33.  P1.44. LTAC inquiry has been placed and we are awaiting precertification from his insurance company.  Infectious diseases recommends IV antibiotic treatment through 4/20/2023.  Plan is for transition to oral antibiotic therapy after completion of IV treatment.  Blood glucose remained stable.    Review of Systems   All pertinent negatives and positives are as above. All other systems have been reviewed and are negative unless otherwise stated.     Objective    Temp:  [97.1 °F (36.2 °C)-98.4 °F (36.9 °C)] 98.4 °F (36.9 °C)  Heart Rate:  [] 146  Resp:  [12-16] 15  BP: (116-159)/() 116/74  Physical Exam  Constitutional:       Appearance: He is obese. He is ill-appearing (chronically).      Comments:  No distress.  No family present.  Discussed with his nurse.   HENT:      Head: Normocephalic.      Comments: Postop dressing around head removed.  Eyes:      Conjunctiva/sclera: Conjunctivae normal.    Cardiovascular:      Rate and Rhythm: Normal rate. Rhythm irregularly irregular and variable from .      Heart sounds: Normal heart sounds.   Pulmonary:      Effort: Pulmonary effort is normal. No respiratory distress.      Breath sounds: No wheezing or rales.   Abdominal:      General: Bowel sounds are normal. There is no distension.      Palpations: Abdomen is soft.      Tenderness: There is no abdominal tenderness.   Musculoskeletal:         General: Swelling (trace) present. No tenderness or deformity. Normal range of motion.   Skin:      General: Skin is warm and dry.      Findings: No rash.   Neurological:      Mental Status: He is alert and oriented to person, place, and time.       Motor: No abnormal muscle tone.   Psychiatric:         Mood and Affect: Mood normal.         Behavior: Behavior normal.         Thought Content: Thought content normal.         Judgment: Judgment normal.     Results Review:  I have reviewed the labs, radiology results, and diagnostic studies.    Laboratory Data:   Results from last 7 days   Lab Units 04/09/23 0317 04/07/23 0249 04/06/23  0352   WBC 10*3/mm3 14.47* 17.47* 13.39*   HEMOGLOBIN g/dL 10.8* 11.0* 10.7*   HEMATOCRIT % 33.3* 34.5* 31.6*   PLATELETS 10*3/mm3 229 246 220        Results from last 7 days   Lab Units 04/09/23 0317 04/07/23 0249 04/06/23  0831 04/05/23  0304 04/04/23  0745   SODIUM mmol/L 140 137 137   < > 136   POTASSIUM mmol/L 3.8 4.0 3.4*   < > 3.3*   CHLORIDE mmol/L 105 102 102   < > 98   CO2 mmol/L 28.0 23.0 25.0   < > 21.0*   BUN mg/dL 24* 41* 40*   < > 42*   CREATININE mg/dL 0.96 1.14 1.28*   < > 1.36*   CALCIUM mg/dL 7.7* 8.1* 7.9*   < > 8.3*   BILIRUBIN mg/dL  --   --   --   --  0.5   ALK PHOS U/L  --   --   --   --  85   ALT (SGPT) U/L  --   --   --   --  10   AST (SGOT) U/L  --   --   --   --  13   GLUCOSE mg/dL 152* 240* 178*   < > 343*    < > = values in this interval not displayed.       Culture Data:   Respiratory Culture   Date Value Ref Range Status   04/03/2023 Rejected  Final       Radiology Data:   Imaging Results (Last 24 Hours)     ** No results found for the last 24 hours. **          I have reviewed the patient's current medications.     Assessment/Plan   Assessment  Active Hospital Problems    Diagnosis    • **Swelling of scalp    • COPD (chronic obstructive pulmonary disease)    • Obesity (BMI 30-39.9)    • Tobacco abuse, in remission    • Acute systolic heart failure    • ADDIE (obstructive sleep apnea)    • Type 2 myocardial infarction due to arrhythmia    • Acute  pulmonary edema due to A-fib RVR    • Acute respiratory failure with hypoxia    • Post-operative infection    • Paroxysmal atrial fibrillation with rapid ventricular response    • Type 2 diabetes mellitus with hyperglycemia and peripheral neuropathy, with long-term current use of insulin (HCC)    • Coronary artery disease    • Meningioma s/p craniotomy        Treatment Plan  Increase Cardizem CD to 300 mg p.o. daily for improved rate control  Continue digoxin  Continue PT  LTAC referral pending insurance precertification    Medical Decision Making  Number and Complexity of problems:   1) respiratory failure with hypoxia secondary to pulmonary edema which is secondary to A-fib RVR, acute, moderate complexity  2) PAF with RVR, acute, moderate complexity  3) meningioma versus intracranial infection, acute, high complexity  4) type 2 diabetes, chronic, moderate complexity     Differential Diagnosis: Pulmonary embolus, LV dysfunction     Conditions and Status        Condition is unchanged.     [facility] Data  External documents reviewed: Care everywhere documentation  Cardiac tracing (EKG, telemetry) interpretation: See HPI  Radiology interpretation: See HPI  Labs reviewed: See HPI  Any tests that were considered but not ordered: None     Decision rules/scores evaluated (example [id number], [place], etc): [id number] score of 5 with a 7.2% risk of stroke annually     Discussed with: The patient and nursing     Care Planning  Shared decision making: The patient  Code status and discussions: Full code     Disposition  Social Determinants of Health that impact treatment or disposition: None  I expect the patient to be discharged by the primary service.    Electronically signed by Ramin Singh DO, 04/10/23, 09:39 CDT.

## 2023-04-10 NOTE — NURSING NOTE
Pt. Jumped out of bed and ran quickly down the [place], then back the opposite direction as staff intervened. He states that he had a nightmare, and people were in his room - and he was chasing them. We sat him in a chair and reoriented him. Then, we assisted him back to his room.

## 2023-04-10 NOTE — PLAN OF CARE
Goal Outcome Evaluation:  Plan of Care Reviewed With: other (see comments)        Progress: improving  Outcome Evaluation: Ntn follow up. Oral intake 68% of four meals. Cont to follow for education needs. CCHO diet. Boost Glucose Control BID. Cont to follow for plan of care.

## 2023-04-10 NOTE — PLAN OF CARE
Goal Outcome Evaluation:   Patient has good bed mobility with HOB elevated. Performed exercise in supine and sitting. Good sitting balance. Able to reach in all directions. Able to safely use urinal sitting EOB. Stood with CGA. Ambulated 130' with EWX and CGA. Step through gate. Took side steps with CGA. CGA for on/off transfer of BSC. Will continue to benefit from strengthening and balance activities.

## 2023-04-10 NOTE — CASE MANAGEMENT/SOCIAL WORK
Continued Stay Note   [place]     Patient Name: Elijah Escobar  MRN: [id number]  Today's Date: 4/10/2023    Admit Date: 3/31/2023    Plan: LTAC referral pending insurance precert   Discharge Plan     Row Name 04/10/23 1238       Plan    Plan LTAC referral pending insurance precert    Plan Comments Insurance precert pending approval for LTAC placement.               Discharge Codes    No documentation.                     TIANA Syed

## 2023-04-10 NOTE — THERAPY TREATMENT NOTE
Acute Care - Physical Therapy Treatment Note  [facility]     Patient Name: Elijah Escobar  : 1955  MRN: [id number]  Today's Date: 4/10/2023      Visit Dx:     ICD-10-CM ICD-9-CM   1. S/P craniotomy  Z98.890 V45.89   2. Impaired mobility  Z74.09 799.89   3. Infection of deep incisional surgical site after procedure, initial encounter  T81.42XA 998.59     Patient Active Problem List   Diagnosis   • Meningioma s/p craniotomy   • Hypertension   • GERD (gastroesophageal reflux disease)   • Coronary artery disease   • Class 2 severe obesity due to excess calories with serious comorbidity and body mass index (BMI) of 38.0 to 38.9 in adult   • Type 2 diabetes mellitus with hyperglycemia and peripheral neuropathy, with long-term current use of insulin (HCC)   • Leukocytosis   • Other specified anemias   • Paroxysmal atrial fibrillation with rapid ventricular response   • Post-operative infection   • Smoker   • History of cranioplasty   • Facial edema   • Swelling of scalp   • Acute pulmonary edema due to A-fib RVR   • Acute respiratory failure with hypoxia   • Type 2 myocardial infarction due to arrhythmia   • COPD (chronic obstructive pulmonary disease)   • Obesity (BMI 30-39.9)   • Tobacco abuse, in remission   • Acute systolic heart failure   • ADDIE (obstructive sleep apnea)     Past Medical History:   Diagnosis Date   • Brain tumor    • Coronary artery disease    • COVID-19 vaccine series completed     MODERNA X 3; LAST DOSE 3/2022   • Diabetes    • Erectile dysfunction    • GERD (gastroesophageal reflux disease)    • Hypercholesteremia    • Hypertension    • Seizure      Past Surgical History:   Procedure Laterality Date   • BALLOON ANGIOPLASTY, ARTERY Right    • COLONOSCOPY     • CRANIECTOMY Right 2022    Procedure: CRANIECTOMY WITH INCISIONAL WASHOUT;  Surgeon: Felipe Banerjee MD;  Location: [facility];  Service: Neurosurgery;  Laterality: Right;   • CRANIOPLASTY Right 10/4/2022    Procedure:  CRANIOPLASTY right with Lumbar drain;  Surgeon: Flaco Portillo MD;  Location:  [facility];  Service: Neurosurgery;  Laterality: Right;   • CRANIOPLASTY N/A 4/4/2023    Procedure: CRANIOPLASTY, removal, right, horseshoe;  Surgeon: Flaco Portillo MD;  Location:  [facility];  Service: Neurosurgery;  Laterality: N/A;   • CRANIOTOMY FOR TUMOR Right 6/16/2022    Procedure: CRANIOTOMY FOR TUMOR STERIOTACTIC WITH BRAIN LAB, right;  Surgeon: Flaco Portillo MD;  Location:  [facility];  Service: Neurosurgery;  Laterality: Right;     PT Assessment (last 12 hours)     PT Evaluation and Treatment     Row Name 04/10/23 1348 04/10/23 1253       Physical Therapy Time and Intention    Subjective Information no complaints  -JUAN --    Document Type therapy note (daily note)  - therapy note (daily note)  -    Mode of Treatment physical therapy  - physical therapy  -    Comment, Session Not Performed -- eating lunch  -    Row Name 04/10/23 0956 04/10/23 0900       Physical Therapy Time and Intention    Mode of Treatment physical therapy  - physical therapy  -    Session Not Performed patient/family declined, not feeling well  - --    Comment, Session Not Performed -- just started breakfast  -    Comment dressing off/ ok for helmet  - --    Row Name 04/10/23 0829          Physical Therapy Time and Intention    Document Type therapy note (daily note)  -     Mode of Treatment physical therapy  -     Comment, Session Not Performed sleeping  -     Row Name 04/10/23 1348 04/10/23 1253       General Information    Existing Precautions/Restrictions fall;oxygen therapy device and L/min   helmet on AAT.  - fall;oxygen therapy device and L/min  -    Row Name 04/10/23 1348          Pain    Posttreatment Pain Rating 0/10 - no pain  -     Row Name 04/10/23 1348          Bed Mobility    Rolling Left [place]icking (Bed Mobility) modified independence  -     Assistive Device (Bed Mobility) head of bed  elevated  -     Row Name 04/10/23 1348          Sit-Stand Transfer    Sit-Stand [place] (Transfers) verbal cues;contact guard  -     Row Name 04/10/23 1348          Stand-Sit Transfer    Stand-Sit [place] (Transfers) verbal cues;contact guard  -[facility] Name 04/10/23 1348          Toilet Transfer    Type (Toilet Transfer) sit-stand;stand-sit  -JUAN     [place] Level (Toilet Transfer) contact guard;verbal cues  -     Assistive Device (Toilet Transfer) commode chair  -     Row Name 04/10/23 1348          Gait/Stairs (Locomotion)    [place] Level (Gait) verbal cues;contact guard  -     Assistive Device (Gait) walker, front-wheeled  -     Distance in Feet (Gait) 130  -     Pattern (Gait) step-through  -     Deviations/Abnormal Patterns (Gait) stride length decreased  -     Bilateral Gait Deviations --  heavy heal strike  -     Comment, (Gait/Stairs) side steps/ CGA  -[facility] Name 04/10/23 1348          Balance    Static Sitting Balance standby assist  -     Dynamic Sitting Balance contact guard  -     Balance Interventions sitting;dynamic reaching  -[facility] Name 04/10/23 1348          Motor Skills    Therapeutic Exercise hip;knee;ankle  -[facility] Name 04/10/23 1348          Hip (Therapeutic Exercise)    Hip (Therapeutic Exercise) AROM (active range of motion)  -     Hip AROM (Therapeutic Exercise) bilateral;extension;flexion;aBduction;aDduction;external rotation;internal rotation;sitting;supine  -[facility] Name 04/10/23 1348          Knee (Therapeutic Exercise)    Knee (Therapeutic Exercise) AROM (active range of motion)  -     Knee AROM (Therapeutic Exercise) bilateral;LAQ (long arc quad);heel slides;sitting;standing  -[facility] Name 04/10/23 1348          Ankle (Therapeutic Exercise)    Ankle (Therapeutic Exercise) AROM (active range of motion)  -     Ankle AROM (Therapeutic Exercise) bilateral;dorsiflexion;plantarflexion  -JUAN     Row Name             Wound  04/04/23 1111 Right temporal region Incision    Wound - Properties Group Placement Date: 04/04/23  -CHRISTIANO Placement Time: 1111  -CHRISTIANO Side: Right  -CHRISTIANO Location: temporal region  -CHRISTIANO Primary Wound Type: Incision  -CHRISTIANO    Retired Wound - Properties Group Placement Date: 04/04/23  -CHRISTIANO Placement Time: 1111  -CHRISTIANO Side: Right  -CHRISTIANO Location: temporal region  -CHRISTIANO Primary Wound Type: Incision  -CHIRSTIANO    Retired Wound - Properties Group Date first assessed: 04/04/23  -CHRISTIANO Time first assessed: 1111  -CHRISTIANO Side: Right  -CHRISTIANO Location: temporal region  -CHRISTIANO Primary Wound Type: Incision  -CHRISTIANO    Row Name 04/10/23 1348          Positioning and Restraints    Pre-Treatment Position in bed  -JUAN     Post Treatment Position chair  -JUAN     In Chair notified nsg;sitting;call light within reach;encouraged to call for assist  -JUAN           User Key  (r) = Recorded By, (t) = Taken By, (c) = Cosigned By    Initials Name Provider Type    Carrie Clark, PTA Physical Therapist Assistant    Ricky George, RN Registered Nurse                Physical Therapy Education     Title: PT OT SLP Therapies (Done)     Topic: Physical Therapy (Done)     Point: Mobility training (Done)     Learning Progress Summary           Patient Acceptance, E,D, DU,VU by JUAN at 4/10/2023 1447    Comment: Safety with transfers, please take time, no impulsiveness    Acceptance, E,TB, VU by CM at 4/6/2023 1350    Acceptance, E, VU by SB at 4/5/2023 1605    Comment: helmet fit, safety, POC    Acceptance, E, VU by CHRISTIANO at 4/3/2023 1353    Comment: gait, safety,    Acceptance, E, VU by SB at 4/1/2023 1405    Comment: pt edu on POC, benefits of act, PLB and d/c plans   Family Acceptance, E,TB, VU by CM at 4/6/2023 1350                   Point: Home exercise program (Done)     Learning Progress Summary           Patient Acceptance, E,TB, VU by CM at 4/6/2023 1350   Family Acceptance, E,TB, VU by CM at 4/6/2023 1350                   Point: Body mechanics (Done)     Learning Progress  Summary           Patient Acceptance, E,TB, VU by CM at 4/6/2023 1350   Family Acceptance, E,TB, VU by CM at 4/6/2023 1350                   Point: Precautions (Done)     Learning Progress Summary           Patient Acceptance, E,TB, VU by CM at 4/6/2023 1350    Acceptance, E, VU by SB at 4/5/2023 1605    Comment: helmet fit, safety, POC    Acceptance, E, VU by SB at 4/1/2023 1405    Comment: pt edu on POC, benefits of act, PLB and d/c plans   Family Acceptance, E,TB, VU by CM at 4/6/2023 1350                               User Key     Initials Effective Dates Name Provider Type Discipline     02/03/23 -  Carrie Callaway, KYLAH Physical Therapist Assistant PT    CHRISTIANO 02/03/23 -  Александр Saravia PTA Physical Therapist Assistant PT    CM 03/02/23 -  Dianelys Carlton, RN Registered Nurse Nurse    SB 06/16/21 -  Erika Javier, PT DPT Physical Therapist PT              PT Recommendation and Plan         Outcome Measures     Row Name 04/10/23 1400 04/09/23 0923 04/08/23 0906       How much help from another person do you currently need...    Turning from your back to your side while in flat bed without using bedrails? 4  - 4  -WK 4  -WK    Moving from lying on back to sitting on the side of a flat bed without bedrails? 4  - 4  -WK 4  -WK    Moving to and from a bed to a chair (including a wheelchair)? 3  - 3  -WK 3  -WK    Standing up from a chair using your arms (e.g., wheelchair, bedside chair)? 4  - 4  -WK 4  -WK    Climbing 3-5 steps with a railing? 3  - 3  -WK 3  -WK    To walk in hospital room? 3  - 3  -WK 3  -WK    AM-PAC 6 Clicks Score (PT) 21  - 21  -WK 21  -WK       Functional Assessment    Outcome Measure Options -- AM-PAC 6 Clicks Basic Mobility (PT)  -WK AM-PAC 6 Clicks Basic Mobility (PT)  -WK          User Key  (r) = Recorded By, (t) = Taken By, (c) = Cosigned By    Initials Name Provider Type    JUAN Carrie Callaway, PTA Physical Therapist Assistant    WK Geensis Root PTA Physical  Therapist Assistant                 Time Calculation:    PT Charges     Row Name 04/10/23 1450             Time Calculation    Start Time 1348  -JUAN      Stop Time 1428  -JUAN      Time Calculation (min) 40 min  -JUAN         Timed Charges    45420 - PT Therapeutic Exercise Minutes 25  -JUAN      32054 - Gait Training Minutes  15  -JUAN         Total Minutes    Timed Charges Total Minutes 40  -JUAN       Total Minutes 40  -JUAN            User Key  (r) = Recorded By, (t) = Taken By, (c) = Cosigned By    Initials Name Provider Type    Carrie Clark PTA Physical Therapist Assistant              Therapy Charges for Today     Code Description Service Date Service Provider Modifiers Qty    89493385379 HC GAIT TRAINING EA 15 MIN 4/10/2023 Carrie Callaway PTA GP 1    58227857642 HC PT THER PROC EA 15 MIN 4/10/2023 Carrie Callaway PTA GP 2          PT G-Codes  Outcome Measure Options: AM-PAC 6 Clicks Basic Mobility (PT)  AM-PAC 6 Clicks Score (PT): 21  AM-PAC 6 Clicks Score (OT): 22    Carrie Callaway PTA  4/10/2023

## 2023-04-10 NOTE — NURSING NOTE
Pt. called me into the room. States that he no longer wants any IV medications and nothing for anxiety. He states that he believes it will prolong his stay here. He proceeded to remove peripheral IV catheter. I explained risks involved, but he still wants IV fluids  anxiety medications discontinued.

## 2023-04-10 NOTE — DISCHARGE PLACEMENT REQUEST
"Virgil Escobar (67 y.o. Male)     Date of Birth   1955    Social Security Number       Address   [address] [place] [address]    Home Phone   [phone]    MRN   [id number]       [employer]   Non-Adventist    Marital Status                               Admission Date   3/31/23    Admission Type   Urgent    Admitting Provider   Flaco Portillo MD    Attending Provider   Flaco Portillo MD    Department, Room/Bed   [facility], I003/1       Discharge Date       Discharge Disposition       Discharge Destination                               Attending Provider: Flaco Portillo MD    Allergies: No Known Allergies    Isolation: None   Infection: None   Code Status: CPR    Ht: 177.8 cm (70\")   Wt: 121 kg (267 lb 9.6 oz)    Admission Cmt: None   Principal Problem: Swelling of scalp [R22.0]                 Active Insurance as of 3/31/2023     Primary Coverage     Payor Plan Insurance Group Employer/Plan Group    ANTHEM MEDICARE REPLACEMENT ANTHEM MEDICARE ADVANTAGE [id number]     Payor Plan Address Payor Plan Phone Number Payor Plan Fax Number Effective Dates    [address] [phone]  1/1/2022 - None Entered    [facility] [id number]       Subscriber Name Subscriber Birth Date Member ID       VIRGIL ESCOBRA 1955 [id number]                 Emergency Contacts      (Rel.) Home Phone Work Phone Mobile Phone    Cathy Guzmán (Son) -- -- [phone]    Manju Lua (Relative) -- -- [phone]    LIMAMAYTE (Spouse) [phone] -- --    david lua (Sister) [phone] -- --    josie benavides (Sister) -- -- [phone]    Jeevan Regan [phone] -- --            Insurance Information                ANTHEM MEDICARE REPLACEMENT/ANTHEM MEDICARE ADVANTAGE Phone: [phone]    Subscriber: Virgil Escobar Subscriber#: [id number]    Group#: [id number] Precert#: --          "

## 2023-04-11 NOTE — CASE MANAGEMENT/SOCIAL WORK
Continued Stay Note   [facility]     Patient Name: Elijah Escobar  MRN: [id number]  Today's Date: 4/11/2023    Admit Date: 3/31/2023    Plan: SNF placement   Discharge Plan     Row Name 04/11/23 1131       Plan    Plan SNF placement    Plan Comments SW spoke with patient regarding SNF placement for continuation of IV antibiotics.  Patient was in agreement with referrals to [facility] and [facility] in [place].  [facility] is out of network with patient's insurance.  Awaiting response.               Discharge Codes    No documentation.                     TIANA Syed

## 2023-04-11 NOTE — PROGRESS NOTES
[facility] Medicine Services  INPATIENT PROGRESS NOTE    Patient Name: Elijah Escobar  Date of Admission: 3/31/2023  Today's Date: 04/11/23  Length of Stay: 11  Primary Care Physician: Brianna Martinez APRN    Subjective   Chief Complaint: No complaint  HPI     Patient is awake, alert and interactive.  He continues on IV antibiotics per infectious diseases.   has spoken with the patient regarding SNF placement for continuation of IV antibiotics.  Referrals have been placed to [facility] and [facility] in [place].  Vital signs are stable.  Patient's heart rate was elevated this morning into the 130s.  Digoxin was increased to 250 mcg daily with an additional dose of 0.125 mcg given this morning.  Oral diltiazem CD dosing was increased to 300 mg yesterday.    Review of Systems   All pertinent negatives and positives are as above. All other systems have been reviewed and are negative unless otherwise stated.     Objective    Temp:  [97.2 °F (36.2 °C)-98.7 °F (37.1 °C)] 98 °F (36.7 °C)  Heart Rate:  [] 133  Resp:  [10-18] 18  BP: ()/(55-98) 116/74  Physical Exam  Constitutional:       Appearance: He is obese. He is ill-appearing (chronically).      Comments:  No distress.  No family present.  Discussed with his nurse.   HENT:      Head: Normocephalic.      Comments: Postop dressing around head removed.  Eyes:      Conjunctiva/sclera: Conjunctivae normal.    Cardiovascular:      Rate and Rhythm: Normal rate. Rhythm irregularly irregular and variable from .      Heart sounds: Normal heart sounds.   Pulmonary:      Effort: Pulmonary effort is normal. No respiratory distress.      Breath sounds: No wheezing or rales.   Abdominal:      General: Bowel sounds are normal. There is no distension.      Palpations: Abdomen is soft.      Tenderness: There is no abdominal tenderness.   Musculoskeletal:         General: Swelling (trace) present. No  tenderness or deformity. Normal range of motion.   Skin:     General: Skin is warm and dry.      Findings: No rash.   Neurological:      Mental Status: He is alert and oriented to person, place, and time.       Motor: No abnormal muscle tone.   Psychiatric:         Mood and Affect: Mood normal.         Behavior: Behavior normal.         Thought Content: Thought content normal.         Judgment: Judgment normal.     Results Review:  I have reviewed the labs, radiology results, and diagnostic studies.    Laboratory Data:   Results from last 7 days   Lab Units 04/09/23 0317 04/07/23 0249 04/06/23  0352   WBC 10*3/mm3 14.47* 17.47* 13.39*   HEMOGLOBIN g/dL 10.8* 11.0* 10.7*   HEMATOCRIT % 33.3* 34.5* 31.6*   PLATELETS 10*3/mm3 229 246 220        Results from last 7 days   Lab Units 04/09/23 0317 04/07/23  0249 04/06/23  0831   SODIUM mmol/L 140 137 137   POTASSIUM mmol/L 3.8 4.0 3.4*   CHLORIDE mmol/L 105 102 102   CO2 mmol/L 28.0 23.0 25.0   BUN mg/dL 24* 41* 40*   CREATININE mg/dL 0.96 1.14 1.28*   CALCIUM mg/dL 7.7* 8.1* 7.9*   GLUCOSE mg/dL 152* 240* 178*       Culture Data:   No results found for: BLOODCX, URINECX, WOUNDCX, MRSACX, RESPCX, STOOLCX    Radiology Data:   Imaging Results (Last 24 Hours)     ** No results found for the last 24 hours. **          I have reviewed the patient's current medications.     Assessment/Plan   Assessment  Active Hospital Problems    Diagnosis    • **Swelling of scalp    • COPD (chronic obstructive pulmonary disease)    • Obesity (BMI 30-39.9)    • Tobacco abuse, in remission    • Acute systolic heart failure    • ADDIE (obstructive sleep apnea)    • Type 2 myocardial infarction due to arrhythmia    • Acute pulmonary edema due to A-fib RVR    • Acute respiratory failure with hypoxia    • Post-operative infection    • Paroxysmal atrial fibrillation with rapid ventricular response    • Type 2 diabetes mellitus with hyperglycemia and peripheral neuropathy, with long-term current use  of insulin (HCC)    • Coronary artery disease    • Meningioma s/p craniotomy        Treatment Plan  Increase digoxin to 250 mcg p.o. daily    Medical Decision Making  Number and Complexity of problems:   1) respiratory failure with hypoxia secondary to pulmonary edema which is secondary to A-fib RVR, acute, moderate complexity  2) PAF with RVR, acute, moderate complexity  3) meningioma versus intracranial infection, acute, high complexity  4) type 2 diabetes, chronic, moderate complexity     Differential Diagnosis: Pulmonary embolus, LV dysfunction     Conditions and Status        Condition is unchanged.     [facility] Data  External documents reviewed: Care everywhere documentation  Cardiac tracing (EKG, telemetry) interpretation: See HPI  Radiology interpretation: See HPI  Labs reviewed: See HPI  Any tests that were considered but not ordered: None     Decision rules/scores evaluated (example [id number], [place], etc): [id number] score of 5 with a 7.2% risk of stroke annually     Discussed with: The patient and nursing     Care Planning  Shared decision making: The patient  Code status and discussions: Full code     Disposition  Social Determinants of Health that impact treatment or disposition: None  I expect the patient to be discharged by the primary service.    Electronically signed by Ramin Singh DO, 04/11/23, 11:42 CDT.

## 2023-04-11 NOTE — PROGRESS NOTES
Neurosurgery Daily Progress Note    HPI:  Elijah Escobar is a 67 y.o. male with a significant medical history of hypertension, diabetes, hyperlipidemia, seizures, craniotomy for tumor (6/16/2022), craniotomy for washout (7/1/2022), craniectomy (10/4/2022), coronary artery disease, diabetes, erectile dysfunction, GERD, hypertension, hyperlipidemia, tobacco abuse, and obesity.  He presents today with a complaint of a 4-5 days onset of progressively worsening right frontal, temporal, and periorbital edema and associated symptoms to include, but not limited to generalized fatigue, chills, dyspnea, intermittent nausea without vomiting, and states he's felt feverish.  Physical exam findings of neurologically intact with right frontal, temporal, and periorbital swelling.  WBC elevated at 15.  3 to an CRP elevated at 14.75.  Imaging pending.    Assessment:   Past Medical History:   Diagnosis Date   • Brain tumor    • Coronary artery disease    • COVID-19 vaccine series completed     MODERNA X 3; LAST DOSE 3/2022   • Diabetes    • Erectile dysfunction    • GERD (gastroesophageal reflux disease)    • Hypercholesteremia    • Hypertension    • Seizure      Active Hospital Problems    Diagnosis    • **Swelling of scalp    • COPD (chronic obstructive pulmonary disease)    • Obesity (BMI 30-39.9)    • Tobacco abuse, in remission    • Acute systolic heart failure    • ADDIE (obstructive sleep apnea)    • Type 2 myocardial infarction due to arrhythmia    • Acute pulmonary edema due to A-fib RVR    • Acute respiratory failure with hypoxia    • Post-operative infection    • Paroxysmal atrial fibrillation with rapid ventricular response    • Type 2 diabetes mellitus with hyperglycemia and peripheral neuropathy, with long-term current use of insulin (HCC)    • Coronary artery disease    • Meningioma s/p craniotomy      Plan:   Neuro: Stable, intact/at baseline  Infected cranial flap      7 Days Post-Op (4/4/2023)  craniectomy and  "washout   Postop CT stable. No acute abnormalities.   Flap tapped I&D cultures 4+ gram-negative bacilli    Heavy 4+ Serratia marcescens   ID consulted.  Appreciate assistance.  JUAN = 15 mL, DC'd  Continue neuro exams per policy.  Call for decline  Transfer to floor    CV: AFib with RVR and heart strain and trop bump.  Stabilized    Appreciate cardiology  Pulm: Chest x-ray cleared  :  Voiding.   FEN: Reg diet for now   Fluid balance for sepsis vs pulmonary edema   Carb consistent diet  Endocrine: Glucose improving.  Continue Glucomander protocol   GI: SHERIF.  +BM  ID: WBC, CRP, and ESR improving   Infected cranial flap  CSF: no growth to date   PNA.  Resolved   UTI.  Resolved   Continue meropenem per ID.  Appreciate assist  Heme:  DVT prophylaxis  Pain: Tolerable at present  Dispo: PT/OT    Transfer to floor   DC pending insurance acceptance SNF.  Patient agreeable     Chief complaint:   4-5 days onset of progressively worsening right frontal, temporal, and periorbital edema and associated symptoms to include, but not limited to generalized fatigue, chills, dyspnea, intermittent nausea without vomiting, and states he's felt feverish.    HPI  Subjective  Doing well    Temp:  [97.2 °F (36.2 °C)-98.7 °F (37.1 °C)] 98 °F (36.7 °C)  Heart Rate:  [] 138  Resp:  [10-18] 18  BP: ()/(55-98) 127/71    Output by Drain (mL) 04/10/23 0701 - 04/10/23 1900 04/10/23 1901 - 04/11/23 0700 04/11/23 0701 - 04/11/23 0836 Range Total   Patient has no LDAs of requested type attached.     Objective:  Vital signs: (most recent): Blood pressure 127/71, pulse (!) 138, temperature 98 °F (36.7 °C), temperature source Oral, resp. rate 18, height 177.8 cm (70\"), weight 121 kg (267 lb 9.6 oz), SpO2 94 %.      Neurologic Exam     Mental Status   Oriented to person, place, and time.   Speech: speech is normal   Level of consciousness: alert    Oriented x3.  Follows commands without prompting showing thumbs up and 2 fingers bilaterally. "     Cranial Nerves     CN III, IV, VI   Pupils are equal, round, and reactive to light.  Extraocular motions are normal.     CN V   Facial sensation intact.     CN VII   Facial expression full, symmetric.     Motor Exam   Right arm pronator drift: absent  Left arm pronator drift: absent    Strength   Right deltoid: 5/5  Left deltoid: 5/5  Right biceps: 5/5  Left biceps: 5/5  Right triceps: 5/5  Left triceps: 5/5  Right iliopsoas: 5/5  Left iliopsoas: 5/5  Right quadriceps: 5/5  Left quadriceps: 5/5  Right gastroc: 5/5  Left gastroc: 5/5  Moves all extremities equal and symmetric     Sensory Exam   Light touch normal.     Drains: * No LDAs found *    Imaging Results (Last 24 Hours)     ** No results found for the last 24 hours. **        Lab Results (last 24 hours)     Procedure Component Value Units Date/Time    POC Glucose Once [[id number]]  (Abnormal) Collected: 04/11/23 0725    Specimen: Blood Updated: 04/11/23 0736     Glucose 186 mg/dL      Comment: : mitesh WagnerMeter ID: [id number]       POC Glucose Once [[id number]]  (Abnormal) Collected: 04/11/23 0431    Specimen: Blood Updated: 04/11/23 0442     Glucose 131 mg/dL      Comment: : [id number] Augusto BaezaMeter ID: [id number]       POC Glucose Once [[id number]]  (Normal) Collected: 04/11/23 0013    Specimen: Blood Updated: 04/11/23 0024     Glucose 87 mg/dL      Comment: : [id number] Augusto BaezaMeter ID: [id number]       POC Glucose Once [[id number]]  (Normal) Collected: 04/10/23 2036    Specimen: Blood Updated: 04/10/23 2052     Glucose 117 mg/dL      Comment: : [id number] Augusto PazelMeter ID: [id number]       POC Glucose Once [[id number]]  (Normal) Collected: 04/10/23 1647    Specimen: Blood Updated: 04/10/23 1658     Glucose 126 mg/dL      Comment: : ema HitchcockMeter ID: [id number]       POC Glucose Once [[id number]]  (Abnormal) Collected: 04/10/23 1139    Specimen: Blood Updated: 04/10/23 1150     Glucose 156  mg/dL      Comment: : sharona Rae ID: [id number]       POC Glucose Once [[id number]]  (Normal) Collected: 04/10/23 0855    Specimen: Blood Updated: 04/10/23 0906     Glucose 79 mg/dL      Comment: : sharona Rae ID: [id number]           Rahul Arnold, APRN

## 2023-04-11 NOTE — CASE MANAGEMENT/SOCIAL WORK
Continued Stay Note   [place]     Patient Name: Elijah Escobar  MRN: [id number]  Today's Date: 4/11/2023    Admit Date: 3/31/2023    Plan: SNF placement   Discharge Plan     Row Name 04/11/23 1239       Plan    Plan SNF placement    Plan Comments [facility] has declined admission.  Referral pending with [facility] in [place].    Row Name 04/11/23 2674       Plan    Plan SNF placement    Plan Comments SW spoke with patient regarding SNF placement for continuation of IV antibiotics.  Patient was in agreement with referrals to [facility] and [facility] in [place].  [facility] is out of network with patient's insurance.  Awaiting response.               Discharge Codes    No documentation.                     TIANA Syed

## 2023-04-11 NOTE — PROGRESS NOTES
"INFECTIOUS DISEASES PROGRESS NOTE    Patient:  Elijah Escobar  YOB: 1955  MRN: [id number]   Admit date: 3/31/2023   Admitting Physician: Flaco Portillo, *  Primary Care Physician: Brianna Martinez APRN    Chief Complaint: \"Did not sleep at all last night\"        Interval History: Patient reports he did not sleep last night because he needed to have a bowel movement.  He reports his nurse did give him something \"that helped\".      Allergies: No Known Allergies    Current Scheduled Medications:   aspirin, 81 mg, Oral, Daily  atorvastatin, 20 mg, Oral, Nightly  budesonide-formoterol, 2 puff, Inhalation, BID - RT  digoxin, 125 mcg, Oral, Daily  dilTIAZem CD, 300 mg, Oral, Q24H  enoxaparin, 1 mg/kg, Subcutaneous, Q12H  furosemide, 40 mg, Oral, Daily  gabapentin, 300 mg, Oral, TID  insulin detemir, 30 Units, Subcutaneous, Q12H  insulin lispro, 0-14 Units, Subcutaneous, TID AC  ipratropium, 0.5 mg, Nebulization, 4x Daily - RT  levETIRAcetam, 500 mg, Oral, BID  meropenem, 1 g, Intravenous, Q8H  methylnaltrexone, 12 mg, Subcutaneous, Every Other Day  metoprolol tartrate, 50 mg, Oral, Q12H  pantoprazole, 40 mg, Oral, Q AM  polyethylene glycol, 17 g, Oral, Daily  potassium chloride, 20 mEq, Oral, Daily  sennosides-docusate, 1 tablet, Oral, Daily  sodium chloride, 10 mL, Intravenous, Q12H      Current PRN Medications:  •  acetaminophen **OR** acetaminophen **OR** acetaminophen  •  butalbital-acetaminophen-caffeine  •  dextrose  •  dextrose  •  glucagon (human recombinant)  •  nicotine  •  ondansetron **OR** ondansetron  •  oxyCODONE-acetaminophen  •  oxyCODONE-acetaminophen  •  Pharmacy to Dose enoxaparin (LOVENOX)  •  sodium chloride  •  sodium chloride    Review of Systems   Constitutional: Negative for fever.   Skin: Negative for rash.       Objective     Vital Signs:  Temp (24hrs), Av.2 °F (36.8 °C), Min:97.2 °F (36.2 °C), Max:98.7 °F (37.1 °C)      /71 (BP Location: Left arm, Patient " "Position: Lying)   Pulse (!) 138   Temp 98 °F (36.7 °C) (Oral)   Resp 18   Ht 177.8 cm (70\")   Wt 121 kg (267 lb 9.6 oz)   SpO2 94%   BMI 38.40 kg/m²         Physical Exam   General: The patient is an obese male lying in bed initially sleeping when I walked in.  HEENT: Sclera anicteric and noninjected.  Incision clean dry and intact.  Respiratory: Effort even and unlabored, he was not conversationally dyspneic  Neuro: Easily awakened from sleep.  Alert, oriented, speech clear  Psych: Pleasant cooperative    Line/IV site: IV site right upper extremity dated 4/3 however no surrounding erythema.  No tenderness to palpation around exit site.  No palpable cord    Results Review:    I reviewed the patient's new clinical results.    Lab Results:  CBC:   CBC w/diff        4/6/2023    03:52 4/7/2023    02:49 4/9/2023    03:17   CBC w/Diff   WBC 13.39   17.47   14.47     RBC 3.47   3.71   3.55     Hemoglobin 10.7   11.0   10.8     Hematocrit 31.6   34.5   33.3     MCV 91.1   93.0   93.8     MCH 30.8   29.6   30.4     MCHC 33.9   31.9   32.4     RDW 11.9   12.2   12.4     Platelets 220   246   229            CMP:   Lab Results   Lab 04/06/23  0831 04/07/23  0249 04/09/23  0317   SODIUM 137 137 140   POTASSIUM 3.4* 4.0 3.8   CHLORIDE 102 102 105   CO2 25.0 23.0 28.0   BUN 40* 41* 24*   CREATININE 1.28* 1.14 0.96   CALCIUM 7.9* 8.1* 7.7*   GLUCOSE 178* 240* 152*       Estimated Creatinine Clearance: 97.4 mL/min (by C-G formula based on SCr of 0.96 mg/dL).    Culture Results:    Microbiology Results (last 10 days)     Procedure Component Value - Date/Time    Miscellaneous Micro Request - Specimen Not Sent, Specimen Not Sent [[id number]] Resulted: 04/05/23 1427    Lab Status: Final result Specimen: Specimen Not Sent Updated: 04/05/23 1427     Extra Tube Completed    Respiratory Culture - Sputum, Cough [[id number]] Collected: 04/03/23 1826    Lab Status: Final result Specimen: Sputum from Cough Updated: 04/04/23 3364     " Respiratory Culture Rejected     Gram Stain Rare (1+) WBCs seen      Few (2+) Epithelial cells seen      Few (2+) Yeast with hyphae seen      Rare (1+) Gram positive cocci    Narrative:      Specimen rejected due to oropharyngeal contamination. Please reorder and recollect specimen if clinically necessary.    Legionella Antigen, Urine - Urine, Urine, Clean Catch [[id number]]  (Normal) Collected: 04/02/23 2148    Lab Status: Final result Specimen: Urine, Clean Catch Updated: 04/02/23 2213     LEGIONELLA ANTIGEN, URINE Negative    S. Pneumo Ag Urine or CSF - Urine, Urine, Clean Catch [[id number]]  (Normal) Collected: 04/02/23 2148    Lab Status: Final result Specimen: Urine, Clean Catch Updated: 04/02/23 2214     Strep Pneumo Ag Negative    Respiratory Panel PCR w/COVID-19(SARS-CoV-2) [facility] In-House, NP Swab in UTM/VTM, 3-4 HR TAT - Swab, Nasopharynx [[id number]]  (Normal) Collected: 04/02/23 1512    Lab Status: Final result Specimen: Swab from Nasopharynx Updated: 04/02/23 1608     ADENOVIRUS, PCR Not Detected     Coronavirus 229E Not Detected     Coronavirus HKU1 Not Detected     Coronavirus NL63 Not Detected     Coronavirus OC43 Not Detected     COVID19 Not Detected     Human Metapneumovirus Not Detected     Human Rhinovirus/Enterovirus Not Detected     Influenza A PCR Not Detected     Influenza B PCR Not Detected     Parainfluenza Virus 1 Not Detected     Parainfluenza Virus 2 Not Detected     Parainfluenza Virus 3 Not Detected     Parainfluenza Virus 4 Not Detected     RSV, PCR Not Detected     Bordetella pertussis pcr Not Detected     Bordetella parapertussis PCR Not Detected     Chlamydophila pneumoniae PCR Not Detected     Mycoplasma pneumo by PCR Not Detected    Narrative:      In the setting of a positive respiratory panel with a viral infection PLUS a negative procalcitonin without other underlying concern for bacterial infection, consider observing off antibiotics or discontinuation of  antibiotics and continue supportive care. If the respiratory panel is positive for atypical bacterial infection (Bordetella pertussis, Chlamydophila pneumoniae, or Mycoplasma pneumoniae), consider antibiotic de-escalation to target atypical bacterial infection.    MRSA Screen, PCR (Inpatient) - Swab, Nares [[id number]]  (Normal) Collected: 04/02/23 1512    Lab Status: Final result Specimen: Swab from Nares Updated: 04/02/23 1636     MRSA PCR No MRSA Detected    Narrative:      The negative predictive value of this diagnostic test is high and should only be used to consider de-escalating anti-MRSA therapy. A positive result may indicate colonization with MRSA and must be correlated clinically.    Wound Culture - Aspirate, Forehead [[id number]]  (Abnormal)  (Susceptibility) Collected: 04/01/23 1120    Lab Status: Edited Result - FINAL Specimen: Aspirate from Forehead Updated: 04/05/23 1426     Wound Culture Heavy growth (4+) Serratia marcescens     Gram Stain Many (4+) WBCs seen      No organisms seen    Susceptibility      Serratia marcescens      STALIN      Cefepime Susceptible      Ceftazidime Susceptible      Ceftriaxone Susceptible      Ertapenem Susceptible (C)  [1]       Gentamicin Susceptible      Levofloxacin Susceptible      Meropenem Susceptible (C)  [1]       Piperacillin + Tazobactam Susceptible  [2]       Tetracycline Resistant     Trimethoprim + Sulfamethoxazole Susceptible                   [1]  Appended report. These results have been appended to a previously final verified report.     [2]  Appended report. These results have been appended to a previously preliminary verified report.             Susceptibility Comments     Serratia marcescens    Cefazolin sensitivity will not be reported for Enterobacteriaceae in non-urine isolates. If cefazolin is preferred, please call the microbiology lab to request an E-test.  With the exception of urinary-sourced infections, aminoglycosides should not be used as  monotherapy.                        Radiology:   Imaging Results (Last 72 Hours)     ** No results found for the last 72 hours. **          Assessment & Plan     Active Hospital Problems    Diagnosis    • **Swelling of scalp    • COPD (chronic obstructive pulmonary disease)    • Obesity (BMI 30-39.9)    • Tobacco abuse, in remission    • Acute systolic heart failure    • ADDIE (obstructive sleep apnea)    • Type 2 myocardial infarction due to arrhythmia    • Acute pulmonary edema due to A-fib RVR    • Acute respiratory failure with hypoxia    • Post-operative infection    • Paroxysmal atrial fibrillation with rapid ventricular response    • Type 2 diabetes mellitus with hyperglycemia and peripheral neuropathy, with long-term current use of insulin (HCC)    • Coronary artery disease    • Meningioma s/p craniotomy        IMPRESSION:  1. Infection of deep incisional surgical site in patient status post right frontal cranioplasty status post craniectomy and washout and flap removal April 4 per Dr. Portillo  2. Serratia marcescens isolated in surgical cultures      RECOMMENDATION:   · Continue meropenem  · We will discuss with Dr. Jeffers possibly narrowing to ertapenem  · Current plan is to continue intravenous antibiotics through April 20 with conversion to oral antibiotics after that- quinolone or TMP sulfa      Lillian Pike MD  04/11/23  08:54 CDT

## 2023-04-11 NOTE — THERAPY TREATMENT NOTE
Acute Care - Physical Therapy Treatment Note  [facility]     Patient Name: Elijah Escobar  : 1955  MRN: [id number]  Today's Date: 2023      Visit Dx:     ICD-10-CM ICD-9-CM   1. S/P craniotomy  Z98.890 V45.89   2. Impaired mobility  Z74.09 799.89   3. Infection of deep incisional surgical site after procedure, initial encounter  T81.42XA 998.59     Patient Active Problem List   Diagnosis   • Meningioma s/p craniotomy   • Hypertension   • GERD (gastroesophageal reflux disease)   • Coronary artery disease   • Class 2 severe obesity due to excess calories with serious comorbidity and body mass index (BMI) of 38.0 to 38.9 in adult   • Type 2 diabetes mellitus with hyperglycemia and peripheral neuropathy, with long-term current use of insulin (HCC)   • Leukocytosis   • Other specified anemias   • Paroxysmal atrial fibrillation with rapid ventricular response   • Post-operative infection   • Smoker   • History of cranioplasty   • Facial edema   • Swelling of scalp   • Acute pulmonary edema due to A-fib RVR   • Acute respiratory failure with hypoxia   • Type 2 myocardial infarction due to arrhythmia   • COPD (chronic obstructive pulmonary disease)   • Obesity (BMI 30-39.9)   • Tobacco abuse, in remission   • Acute systolic heart failure   • ADDIE (obstructive sleep apnea)     Past Medical History:   Diagnosis Date   • Brain tumor    • Coronary artery disease    • COVID-19 vaccine series completed     MODERNA X 3; LAST DOSE 3/2022   • Diabetes    • Erectile dysfunction    • GERD (gastroesophageal reflux disease)    • Hypercholesteremia    • Hypertension    • Seizure      Past Surgical History:   Procedure Laterality Date   • BALLOON ANGIOPLASTY, ARTERY Right    • COLONOSCOPY     • CRANIECTOMY Right 2022    Procedure: CRANIECTOMY WITH INCISIONAL WASHOUT;  Surgeon: Felipe Banerjee MD;  Location: [facility];  Service: Neurosurgery;  Laterality: Right;   • CRANIOPLASTY Right 10/4/2022    Procedure:  CRANIOPLASTY right with Lumbar drain;  Surgeon: Flaco Portillo MD;  Location:  [facility];  Service: Neurosurgery;  Laterality: Right;   • CRANIOPLASTY N/A 4/4/2023    Procedure: CRANIOPLASTY, removal, right, horseshoe;  Surgeon: Flaco Portillo MD;  Location:  [facility];  Service: Neurosurgery;  Laterality: N/A;   • CRANIOTOMY FOR TUMOR Right 6/16/2022    Procedure: CRANIOTOMY FOR TUMOR STERIOTACTIC WITH BRAIN LAB, right;  Surgeon: Flaco Portillo MD;  Location:  [facility];  Service: Neurosurgery;  Laterality: Right;     PT Assessment (last 12 hours)     PT Evaluation and Treatment     Row Name 04/11/23 0723          Physical Therapy Time and Intention    Subjective Information complains of;fatigue;pain  stomach upset  -     Document Type therapy note (daily note)  -     Mode of Treatment physical therapy  -     Row Name 04/11/23 0723          General Information    Existing Precautions/Restrictions fall   Helmet on when up  -     Row Name 04/11/23 0723          Pain    Posttreatment Pain Rating 4/10  -     Pain Location - head  -JUAN     Row Name 04/11/23 0723          Bed Mobility    Rolling Right [place] (Bed Mobility) independent  -JUAN     Supine-Sit [place] (Bed Mobility) independent  -JUAN     Comment, (Bed Mobility) flat bed  -JUAN     Row Name 04/11/23 0723          Sit-Stand Transfer    Sit-Stand [place] (Transfers) verbal cues;contact guard  -JUAN     Row Name 04/11/23 0723          Stand-Sit Transfer    Stand-Sit [place] (Transfers) verbal cues;contact guard  -JUAN     Row Name 04/11/23 0723          Toilet Transfer    Type (Toilet Transfer) sit-stand;stand-sit  -JUAN     [place] Level (Toilet Transfer) contact guard  -     Assistive Device (Toilet Transfer) commode chair  -JUAN     Row Name 04/11/23 0723          Gait/Stairs (Locomotion)    [place] Level (Gait) contact guard  -JUAN     Assistive Device (Gait) walker, front-wheeled  -     Distance in Feet  (Gait) 130  -JUAN     Pattern (Gait) step-through  -     Deviations/Abnormal Patterns (Gait) stride length decreased;brittany decreased  -     Right Sided Gait Deviations --  heavy heal strike  -     Row Name 04/11/23 0723          Balance    Static Sitting Balance supervision  -     Dynamic Sitting Balance standby assist  -JUAN     Position, Sitting Balance sitting edge of bed  -     Row Name 04/11/23 0723          Hip (Therapeutic Exercise)    Hip AROM (Therapeutic Exercise) bilateral;flexion;aBduction;aDduction  -     Row Name 04/11/23 0723          Knee (Therapeutic Exercise)    Knee AROM (Therapeutic Exercise) bilateral;LAQ (long arc quad)  -     Row Name 04/11/23 0723          Ankle (Therapeutic Exercise)    Ankle AROM (Therapeutic Exercise) bilateral;dorsiflexion;plantarflexion  -     Row Name             Wound 04/04/23 1111 Right temporal region Incision    Wound - Properties Group Placement Date: 04/04/23  -CHRISTIANO Placement Time: 1111  -CHRISTIANO Side: Right  -CHRISTIANO Location: temporal region  -CHRISTIANO Primary Wound Type: Incision  -CHRISTIANO    Retired Wound - Properties Group Placement Date: 04/04/23  -CHRISTIANO Placement Time: 1111  -CHRISTIANO Side: Right  -CHRISTIANO Location: temporal region  -CHRISTIANO Primary Wound Type: Incision  -CHRISTIANO    Retired Wound - Properties Group Date first assessed: 04/04/23  -CHRISTIANO Time first assessed: 1111  -CHRISTIANO Side: Right  -CHRISTIANO Location: temporal region  -CHRISTIANO Primary Wound Type: Incision  -CHRISTIANO    Row Name 04/11/23 0723          Positioning and Restraints    Pre-Treatment Position in bed  -JUAN     Post Treatment Position chair  -JUAN     In Chair notified nsg;reclined;call light within reach;encouraged to call for assist  -JUAN           User Key  (r) = Recorded By, (t) = Taken By, (c) = Cosigned By    Initials Name Provider Type    JUAN Carrie Callaway PTA Physical Therapist Assistant    Ricky George, RN Registered Nurse                Physical Therapy Education     Title: PT OT SLP Therapies (In Progress)     Topic:  Physical Therapy (In Progress)     Point: Mobility training (In Progress)     Learning Progress Summary           Patient Acceptance, E, NR by JUAN at 4/11/2023 0809    Comment: Looking ahead during gait    Acceptance, E,D, DU,VU by JUAN at 4/10/2023 1447    Comment: Safety with transfers, please take time, no impulsiveness    Acceptance, E,TB, VU by CM at 4/6/2023 1350    Acceptance, E, VU by SB at 4/5/2023 1605    Comment: helmet fit, safety, POC    Acceptance, E, VU by CHRISTIANO at 4/3/2023 1353    Comment: gait, safety,    Acceptance, E, VU by SB at 4/1/2023 1405    Comment: pt edu on POC, benefits of act, PLB and d/c plans   Family Acceptance, E,TB, VU by CM at 4/6/2023 1350                   Point: Home exercise program (Done)     Learning Progress Summary           Patient Acceptance, E,TB, VU by CM at 4/6/2023 1350   Family Acceptance, E,TB, VU by CM at 4/6/2023 1350                   Point: Body mechanics (Done)     Learning Progress Summary           Patient Acceptance, E,TB, VU by CM at 4/6/2023 1350   Family Acceptance, E,TB, VU by CM at 4/6/2023 1350                   Point: Precautions (Done)     Learning Progress Summary           Patient Acceptance, E,TB, VU by CM at 4/6/2023 1350    Acceptance, E, VU by SB at 4/5/2023 1605    Comment: helmet fit, safety, POC    Acceptance, E, VU by SB at 4/1/2023 1405    Comment: pt edu on POC, benefits of act, PLB and d/c plans   Family Acceptance, E,TB, VU by CM at 4/6/2023 1350                               User Key     Initials Effective Dates Name Provider Type Discipline    JUAN 02/03/23 -  Carrie Callaway PTA Physical Therapist Assistant PT    CHRISTIANO 02/03/23 -  Александр Saravia, [place] Physical Therapist Assistant PT    CM 03/02/23 -  Dianelys Carlton, RN Registered Nurse Nurse    SB 06/16/21 -  Erika Javier, PT DPT Physical Therapist PT              PT Recommendation and Plan         Outcome Measures     Row Name 04/11/23 0800 04/10/23 1400 04/09/23 0923       How much  help from another person do you currently need...    Turning from your back to your side while in flat bed without using bedrails? 4  -JUAN 4  -JUAN 4  -WK    Moving from lying on back to sitting on the side of a flat bed without bedrails? 4  -JUAN 4  -JUAN 4  -WK    Moving to and from a bed to a chair (including a wheelchair)? 3  -JUAN 3  -JUAN 3  -WK    Standing up from a chair using your arms (e.g., wheelchair, bedside chair)? 4  -JUAN 4  -JUAN 4  -WK    Climbing 3-5 steps with a railing? 3  -JUAN 3  -JUAN 3  -WK    To walk in hospital room? 3  -JUAN 3  -JUAN 3  -WK    AM-PAC 6 Clicks Score (PT) 21  -JUAN 21  -JUAN 21  -WK       Functional Assessment    Outcome Measure Options -- -- AM-PAC 6 Clicks Basic Mobility (PT)  -WK    Row Name 04/08/23 0906             How much help from another person do you currently need...    Turning from your back to your side while in flat bed without using bedrails? 4  -WK      Moving from lying on back to sitting on the side of a flat bed without bedrails? 4  -WK      Moving to and from a bed to a chair (including a wheelchair)? 3  -WK      Standing up from a chair using your arms (e.g., wheelchair, bedside chair)? 4  -WK      Climbing 3-5 steps with a railing? 3  -WK      To walk in hospital room? 3  -WK      AM-PAC 6 Clicks Score (PT) 21  -WK         Functional Assessment    Outcome Measure Options AM-PAC 6 Clicks Basic Mobility (PT)  -WK            User Key  (r) = Recorded By, (t) = Taken By, (c) = Cosigned By    Initials Name Provider Type    Carrie Clark PTA Physical Therapist Assistant    WK Genesis Root PTA Physical Therapist Assistant                 Time Calculation:    PT Charges     Row Name 04/11/23 0813             Time Calculation    Start Time 0723  -JUAN      Stop Time 0754  -JUAN      Time Calculation (min) 31 min  -JUAN         Timed Charges    16844 - PT Therapeutic Exercise Minutes 15  -JUAN      31177 - Gait Training Minutes  16  -JUAN         Total Minutes    Timed Charges Total  Minutes 31  -JUAN       Total Minutes 31  -JUAN            User Key  (r) = Recorded By, (t) = Taken By, (c) = Cosigned By    Initials Name Provider Type    Carrie Clark, PTA Physical Therapist Assistant              Therapy Charges for Today     Code Description Service Date Service Provider Modifiers Qty    58045028921 HC GAIT TRAINING EA 15 MIN 4/10/2023 Carrie Callaway, PTA GP 1    49252293703 HC PT THER PROC EA 15 MIN 4/10/2023 Carrie Callaway, PTA GP 2    98015234293 HC GAIT TRAINING EA 15 MIN 4/11/2023 Carrie Callaway, PTA GP 1    30301736373 HC PT THER PROC EA 15 MIN 4/11/2023 Carrie Callaway, PTA GP 1          PT G-Codes  Outcome Measure Options: AM-PAC 6 Clicks Basic Mobility (PT)  AM-PAC 6 Clicks Score (PT): 21  AM-PAC 6 Clicks Score (OT): 22    Carrie Callaway PTA  4/11/2023

## 2023-04-11 NOTE — CASE MANAGEMENT/SOCIAL WORK
Continued Stay Note   [facility]     Patient Name: Elijah Escobar  MRN: [id number]  Today's Date: 4/11/2023    Admit Date: 3/31/2023    Plan: Home infusion vs SNF   Discharge Plan     Row Name 04/11/23 0820       Plan    Plan Home infusion vs SNF    Plan Comments Acute Rehab would not be an option for patient as he is back to [facility] for occupational therapy and they have signed off.  Patient is doing well with physical therapy.  Will need IV antibiotics until 4/20.  Short term SNF vs home infusion?               Discharge Codes    No documentation.                     TIANA Syed

## 2023-04-11 NOTE — PLAN OF CARE
Goal Outcome Evaluation:   Patient is independent in bed mobility. CGA to stand and transfer 6' to BSC. CGA to stand from BSC and ambulate 130' with Rwx. Decreased step length. Tends to look down, cues to hold head up. Actively works LE ex's. Complains of not having an appetite and head pain at 5/10. Will continue to benefit from increased ambulation and strengthening activities.

## 2023-04-11 NOTE — THERAPY TREATMENT NOTE
Acute Care - Physical Therapy Treatment Note  [facility]     Patient Name: Elijah Escobar  : 1955  MRN: [id number]  Today's Date: 2023      Visit Dx:     ICD-10-CM ICD-9-CM   1. S/P craniotomy  Z98.890 V45.89   2. Impaired mobility  Z74.09 799.89   3. Infection of deep incisional surgical site after procedure, initial encounter  T81.42XA 998.59     Patient Active Problem List   Diagnosis   • Meningioma s/p craniotomy   • Hypertension   • GERD (gastroesophageal reflux disease)   • Coronary artery disease   • Class 2 severe obesity due to excess calories with serious comorbidity and body mass index (BMI) of 38.0 to 38.9 in adult   • Type 2 diabetes mellitus with hyperglycemia and peripheral neuropathy, with long-term current use of insulin (HCC)   • Leukocytosis   • Other specified anemias   • Paroxysmal atrial fibrillation with rapid ventricular response   • Post-operative infection   • Smoker   • History of cranioplasty   • Facial edema   • Swelling of scalp   • Acute pulmonary edema due to A-fib RVR   • Acute respiratory failure with hypoxia   • Type 2 myocardial infarction due to arrhythmia   • COPD (chronic obstructive pulmonary disease)   • Obesity (BMI 30-39.9)   • Tobacco abuse, in remission   • Acute systolic heart failure   • ADDIE (obstructive sleep apnea)     Past Medical History:   Diagnosis Date   • Brain tumor    • Coronary artery disease    • COVID-19 vaccine series completed     MODERNA X 3; LAST DOSE 3/2022   • Diabetes    • Erectile dysfunction    • GERD (gastroesophageal reflux disease)    • Hypercholesteremia    • Hypertension    • Seizure      Past Surgical History:   Procedure Laterality Date   • BALLOON ANGIOPLASTY, ARTERY Right    • COLONOSCOPY     • CRANIECTOMY Right 2022    Procedure: CRANIECTOMY WITH INCISIONAL WASHOUT;  Surgeon: Felipe Banerjee MD;  Location: [facility];  Service: Neurosurgery;  Laterality: Right;   • CRANIOPLASTY Right 10/4/2022    Procedure:  CRANIOPLASTY right with Lumbar drain;  Surgeon: Flaco Portillo MD;  Location:  [facility];  Service: Neurosurgery;  Laterality: Right;   • CRANIOPLASTY N/A 4/4/2023    Procedure: CRANIOPLASTY, removal, right, horseshoe;  Surgeon: Flaco Portilol MD;  Location:  [facility];  Service: Neurosurgery;  Laterality: N/A;   • CRANIOTOMY FOR TUMOR Right 6/16/2022    Procedure: CRANIOTOMY FOR TUMOR STERIOTACTIC WITH BRAIN LAB, right;  Surgeon: Flaco Portillo MD;  Location:  [facility];  Service: Neurosurgery;  Laterality: Right;     PT Assessment (last 12 hours)     PT Evaluation and Treatment     Row Name 04/11/23 1510 04/11/23 0723       Physical Therapy Time and Intention    Subjective Information complains of;weakness;fatigue  -CHRITSIANO complains of;fatigue;pain  stomach upset  -    Document Type therapy note (daily note)  -CHRISTIANO therapy note (daily note)  -JUAN    Mode of Treatment physical therapy  -CHRISTIANO physical therapy  -JUAN    Comment some confusion  -CHRISTIANO --    Row Name 04/11/23 1510 04/11/23 0723       General Information    Existing Precautions/Restrictions fall  helmet on when up  -CHRISTIANO fall   Helmet on when up  -JUAN    Row Name 04/11/23 1510 04/11/23 0723       Pain    Pretreatment Pain Rating 4/10  -CHRISTIANO --    Posttreatment Pain Rating 4/10  -CHRISTIANO 4/10  -    Pain Location - head  -CHRISTIANO head  -JUAN    Pain Intervention(s) Repositioned  -CHRISTIANO --    Row Name 04/11/23 1510 04/11/23 0723       Bed Mobility    Rolling Right [place] (Bed Mobility) -- independent  -JUAN    Supine-Sit [place] (Bed Mobility) modified independence  -CHRISTIANO independent  -JUAN    Sit-Supine [place] (Bed Mobility) verbal cues;contact guard  -CHRISTIANO --    Comment, (Bed Mobility) -- flat bed  -JUAN    Row Name 04/11/23 1510 04/11/23 0723       Sit-Stand Transfer    Sit-Stand [place] (Transfers) verbal cues;contact guard  -CHRISTIANO verbal cues;contact guard  -JUAN    Assistive Device (Sit-Stand Transfers) walker, front-wheeled  -CHRISTIANO --    Row Name  04/11/23 1510 04/11/23 0723       Stand-Sit Transfer    Stand-Sit [place] (Transfers) verbal cues;contact guard  -CHRISTIANO verbal cues;contact guard  -JUAN    Assistive Device (Stand-Sit Transfers) walker, front-wheeled  -CHRISTIANO --    Row Name 04/11/23 0723          Toilet Transfer    Type (Toilet Transfer) sit-stand;stand-sit  -JUAN     [place] Level (Toilet Transfer) contact guard  -JUAN     Assistive Device (Toilet Transfer) commode chair  -JUAN     Row Name 04/11/23 1510 04/11/23 0723       Gait/Stairs (Locomotion)    [place] Level (Gait) verbal cues;contact guard  -CHRISTIANO contact guard  -JUAN    Assistive Device (Gait) walker, front-wheeled  -CHRISTIANO walker, front-wheeled  -JUAN    Distance in Feet (Gait) 60  -CHRISTIANO 130  -JUAN    Pattern (Gait) step-through  -CHRISTIANO step-through  -JUAN    Deviations/Abnormal Patterns (Gait) stride length decreased  -CHRISTIANO stride length decreased;brittany decreased  -JUAN    Right Sided Gait Deviations -- --  heavy heal strike  -JUAN    Comment, (Gait/Stairs) c/o not feeling well, decreased distance  -CHRISTIANO --    Row Name 04/11/23 0723          Balance    Static Sitting Balance supervision  -     Dynamic Sitting Balance standby assist  -JUAN     Position, Sitting Balance sitting edge of bed  -     Row Name 04/11/23 0723          Hip (Therapeutic Exercise)    Hip AROM (Therapeutic Exercise) bilateral;flexion;aBduction;aDduction  -     Row Name 04/11/23 0723          Knee (Therapeutic Exercise)    Knee AROM (Therapeutic Exercise) bilateral;LAQ (long arc quad)  -     Row Name 04/11/23 0723          Ankle (Therapeutic Exercise)    Ankle AROM (Therapeutic Exercise) bilateral;dorsiflexion;plantarflexion  -     Row Name             Wound 04/04/23 1111 Right temporal region Incision    Wound - Properties Group Placement Date: 04/04/23  -JBA Placement Time: 1111  -JBA Side: Right  -JBA Location: temporal region  -JBA Primary Wound Type: Incision  -JBA    Retired Wound - Properties Group Placement Date: 04/04/23  -JBA  Placement Time: 1111  -JBA Side: Right  -JBA Location: temporal region  -JBA Primary Wound Type: Incision  -JBA    Retired Wound - Properties Group Date first assessed: 04/04/23  -JBA Time first assessed: 1111  -JBA Side: Right  -JBA Location: temporal region  -JBA Primary Wound Type: Incision  -JBA    Row Name 04/11/23 1510 04/11/23 0723       Positioning and Restraints    Pre-Treatment Position in bed  -CHRISTIANO in bed  -JUAN    Post Treatment Position bed  -CHRISTIANO chair  -JUAN    In Bed fowlers;call light within reach;encouraged to call for assist;exit alarm on;side rails up x2  -CHRISTIANO --    In Chair -- notified nsg;reclined;call light within reach;encouraged to call for assist  -JUAN          User Key  (r) = Recorded By, (t) = Taken By, (c) = Cosigned By    Initials Name Provider Type    Carrie Clark, PTA Physical Therapist Assistant    Александр Morley, [place] Physical Therapist Assistant    Ricky Watts, RN Registered Nurse                Physical Therapy Education     Title: PT OT SLP Therapies (In Progress)     Topic: Physical Therapy (In Progress)     Point: Mobility training (In Progress)     Learning Progress Summary           Patient Acceptance, E, NR by JUAN at 4/11/2023 0809    Comment: Looking ahead during gait    Acceptance, E,D, DU,VU by JUAN at 4/10/2023 1447    Comment: Safety with transfers, please take time, no impulsiveness    Acceptance, E,TB, VU by CM at 4/6/2023 1350    Acceptance, E, VU by SB at 4/5/2023 1605    Comment: helmet fit, safety, POC    Acceptance, E, VU by CHRISTIANO at 4/3/2023 1353    Comment: gait, safety,    Acceptance, E, VU by SB at 4/1/2023 1405    Comment: pt edu on POC, benefits of act, PLB and d/c plans   Family Acceptance, E,TB, VU by CM at 4/6/2023 1350                   Point: Home exercise program (Done)     Learning Progress Summary           Patient Acceptance, E,TB, VU by CM at 4/6/2023 1350   Family Acceptance, E,TB, VU by CM at 4/6/2023 1350                   Point: Body  mechanics (Done)     Learning Progress Summary           Patient Acceptance, E,TB, VU by CM at 4/6/2023 1350   Family Acceptance, E,TB, VU by CM at 4/6/2023 1350                   Point: Precautions (Done)     Learning Progress Summary           Patient Acceptance, E,TB, VU by CM at 4/6/2023 1350    Acceptance, E, VU by SB at 4/5/2023 1605    Comment: helmet fit, safety, POC    Acceptance, E, VU by SB at 4/1/2023 1405    Comment: pt edu on POC, benefits of act, PLB and d/c plans   Family Acceptance, E,TB, VU by CM at 4/6/2023 1350                               User Key     Initials Effective Dates Name Provider Type Discipline    JUAN 02/03/23 -  Carrie Callaway, PTA Physical Therapist Assistant PT    CHRISTIANO 02/03/23 -  Александр Saravia PTA Physical Therapist Assistant PT    CM 03/02/23 -  Dianelys Carlton, RN Registered Nurse Nurse    SB 06/16/21 -  Erika Javier, PT DPT Physical Therapist PT              PT Recommendation and Plan     Plan of Care Reviewed With: patient  Progress: improving  Outcome Evaluation: Pt was in bed.  Transfered supine to sitting with CGA.  Transfered sit to stand with CGA.  Amb 200' with straight cane and CGA. on 2L throughout tx.   Outcome Measures     Row Name 04/11/23 0800 04/10/23 1400 04/09/23 0923       How much help from another person do you currently need...    Turning from your back to your side while in flat bed without using bedrails? 4  -JUAN 4  -JUAN 4  -WK    Moving from lying on back to sitting on the side of a flat bed without bedrails? 4  -JUAN 4  -JUAN 4  -WK    Moving to and from a bed to a chair (including a wheelchair)? 3  -JUAN 3  -JUAN 3  -WK    Standing up from a chair using your arms (e.g., wheelchair, bedside chair)? 4  -JUAN 4  -JUAN 4  -WK    Climbing 3-5 steps with a railing? 3  -JUAN 3  -JUAN 3  -WK    To walk in hospital room? 3  -JUAN 3  -JUAN 3  -WK    AM-PAC 6 Clicks Score (PT) 21  -JUAN 21  -JUAN 21  -WK       Functional Assessment    Outcome Measure Options -- -- AM-PAC 6 Clicks  Basic Mobility (PT)  -WK          User Key  (r) = Recorded By, (t) = Taken By, (c) = Cosigned By    Initials Name Provider Type    Carrie Clark, [place] Physical Therapist Assistant    WK Genesis Root, [place] Physical Therapist Assistant                 Time Calculation:    PT Charges     Row Name 04/11/23 1510 04/11/23 0813          Time Calculation    Start Time 1510  -CHRISTIANO 0723  -JUAN     Stop Time 1525  -CHRISTIANO 0754  -JUAN     Time Calculation (min) 15 min  -CHRISTIANO 31 min  -JUAN     PT Received On 04/11/23  -CHRISTIANO --        Time Calculation- PT    Total Timed Code Minutes- PT 15 minute(s)  -CHRISTIANO --        Timed Charges    49011 - PT Therapeutic Exercise Minutes -- 15  -JUAN     58543 - Gait Training Minutes  15  -CHRISTIANO 16  -JUAN        Total Minutes    Timed Charges Total Minutes 15  -CHRISTIANO 31  -JUAN      Total Minutes 15  -CHRISTIANO 31  -JUAN           User Key  (r) = Recorded By, (t) = Taken By, (c) = Cosigned By    Initials Name Provider Type    Carrie Clark, [place] Physical Therapist Assistant    Александр Morley PTA Physical Therapist Assistant              Therapy Charges for Today     Code Description Service Date Service Provider Modifiers Qty    61128511588 HC GAIT TRAINING EA 15 MIN 4/11/2023 Александр Saravia, [place] GP 1          PT G-Codes  Outcome Measure Options: AM-PAC 6 Clicks Basic Mobility (PT)  AM-PAC 6 Clicks Score (PT): 21  AM-PAC 6 Clicks Score (OT): 22    Александр Saravia PTA  4/11/2023

## 2023-04-11 NOTE — PLAN OF CARE
Levemir held due to BG of 117, dosing may need to be adjusted now that Pt is not receiving steroids, Pt experienced N/V with zofran given x1, afebrile, HR trending upward into the 120s this morning.   Problem: Adult Inpatient Plan of Care  Goal: Plan of Care Review  Outcome: Ongoing, Progressing  Goal: Patient-Specific Goal (Individualized)  Outcome: Ongoing, Progressing  Goal: Absence of Hospital-Acquired Illness or Injury  Outcome: Ongoing, Progressing  Intervention: Identify and Manage Fall Risk  Recent Flowsheet Documentation  Taken 4/11/2023 0600 by Aryan Casas RN  Safety Promotion/Fall Prevention:   safety round/check completed   fall prevention program maintained  Taken 4/11/2023 0500 by Aryan Casas RN  Safety Promotion/Fall Prevention:   safety round/check completed   fall prevention program maintained  Taken 4/11/2023 0400 by Aryan Casas RN  Safety Promotion/Fall Prevention:   safety round/check completed   fall prevention program maintained  Taken 4/11/2023 0300 by Aryan Casas RN  Safety Promotion/Fall Prevention:   safety round/check completed   fall prevention program maintained  Taken 4/11/2023 0200 by Aryan Casas RN  Safety Promotion/Fall Prevention:   safety round/check completed   fall prevention program maintained  Taken 4/11/2023 0100 by Aryan Casas RN  Safety Promotion/Fall Prevention:   safety round/check completed   fall prevention program maintained  Taken 4/11/2023 0000 by Aryan Casas RN  Safety Promotion/Fall Prevention:   safety round/check completed   fall prevention program maintained  Taken 4/10/2023 2300 by Aryan Casas RN  Safety Promotion/Fall Prevention:   safety round/check completed   fall prevention program maintained  Taken 4/10/2023 2200 by Aryan Casas RN  Safety Promotion/Fall Prevention:   safety round/check completed   fall prevention program maintained  Taken 4/10/2023 2100 by Aryan Casas RN  Safety Promotion/Fall Prevention:   safety round/check  completed   fall prevention program maintained  Taken 4/10/2023 2000 by Aryan Casas RN  Safety Promotion/Fall Prevention:   safety round/check completed   fall prevention program maintained  Taken 4/10/2023 1900 by Aryan Casas RN  Safety Promotion/Fall Prevention:   safety round/check completed   fall prevention program maintained  Intervention: Prevent Skin Injury  Recent Flowsheet Documentation  Taken 4/11/2023 0600 by Aryan Casas RN  Body Position: position changed independently  Taken 4/11/2023 0400 by Aryan Casas RN  Body Position:   position changed independently   right  Skin Protection:   adhesive use limited   skin-to-device areas padded   skin-to-skin areas padded   transparent dressing maintained   tubing/devices free from skin contact  Taken 4/11/2023 0200 by Aryan Casas RN  Body Position:   position changed independently   right  Taken 4/11/2023 0000 by Aryan Casas RN  Body Position: position changed independently  Skin Protection:   adhesive use limited   incontinence pads utilized   skin-to-skin areas padded   skin-to-device areas padded   tubing/devices free from skin contact   transparent dressing maintained  Taken 4/10/2023 2200 by Aryan Casas RN  Body Position: position changed independently  Taken 4/10/2023 2000 by Aryan Casas RN  Body Position:   position changed independently   right  Skin Protection:   adhesive use limited   incontinence pads utilized   skin-to-skin areas padded   skin-to-device areas padded   tubing/devices free from skin contact   transparent dressing maintained  Intervention: Prevent and Manage VTE (Venous Thromboembolism) Risk  Recent Flowsheet Documentation  Taken 4/11/2023 0400 by Aryan Casas RN  Activity Management: up to bedside commode  Range of Motion: active ROM (range of motion) encouraged  Taken 4/11/2023 0000 by Aryan Casas RN  Activity Management: up to bedside commode  VTE Prevention/Management: (see MAR)   sequential compression devices  off   bilateral   other (see comments)  Range of Motion: active ROM (range of motion) encouraged  Taken 4/10/2023 2000 by Aryan Casas RN  Activity Management: ambulated in room  VTE Prevention/Management:   sequential compression devices off   bilateral  Range of Motion: active ROM (range of motion) encouraged  Intervention: Prevent Infection  Recent Flowsheet Documentation  Taken 4/11/2023 0400 by Aryan Casas RN  Infection Prevention:   single patient room provided   rest/sleep promoted  Taken 4/11/2023 0000 by Aryan Casas RN  Infection Prevention:   single patient room provided   rest/sleep promoted  Taken 4/10/2023 2000 by Aryan Casas RN  Infection Prevention:   single patient room provided   rest/sleep promoted  Goal: Optimal Comfort and Wellbeing  Outcome: Ongoing, Progressing  Intervention: Provide Person-Centered Care  Recent Flowsheet Documentation  Taken 4/11/2023 0400 by Aryan Casas RN  Trust Relationship/Rapport:   care explained   questions encouraged   reassurance provided   questions answered  Taken 4/11/2023 0000 by Aryan Casas RN  Trust Relationship/Rapport:   care explained   questions answered  Taken 4/10/2023 2000 by Aryan Casas RN  Trust Relationship/Rapport:   care explained   questions encouraged   questions answered   reassurance provided  Goal: Readiness for Transition of Care  Outcome: Ongoing, Progressing     Problem: Diabetes Comorbidity  Goal: Blood Glucose Level Within Targeted Range  Outcome: Ongoing, Progressing  Intervention: Monitor and Manage Glycemia  Recent Flowsheet Documentation  Taken 4/11/2023 0400 by Aryan Casas RN  Glycemic Management: blood glucose monitored  Taken 4/11/2023 0000 by Aryan Casas RN  Glycemic Management: blood glucose monitored  Taken 4/10/2023 2000 by Aryan Casas RN  Glycemic Management: blood glucose monitored     Problem: Hypertension Comorbidity  Goal: Blood Pressure in Desired Range  Outcome: Ongoing, Progressing  Intervention:  Maintain Blood Pressure Management  Recent Flowsheet Documentation  Taken 4/11/2023 0400 by Aryan Casas RN  Medication Review/Management: medications reviewed  Taken 4/11/2023 0000 by Aryan Casas RN  Medication Review/Management: medications reviewed  Taken 4/10/2023 2000 by Aryan Casas RN  Medication Review/Management: medications reviewed     Problem: Fall Injury Risk  Goal: Absence of Fall and Fall-Related Injury  Outcome: Ongoing, Progressing  Intervention: Identify and Manage Contributors  Recent Flowsheet Documentation  Taken 4/11/2023 0400 by Aryan Casas RN  Medication Review/Management: medications reviewed  Taken 4/11/2023 0000 by Aryan Casas RN  Medication Review/Management: medications reviewed  Self-Care Promotion: independence encouraged  Taken 4/10/2023 2000 by Aryan Casas RN  Medication Review/Management: medications reviewed  Self-Care Promotion: independence encouraged  Intervention: Promote Injury-Free Environment  Recent Flowsheet Documentation  Taken 4/11/2023 0600 by Aryan Casas RN  Safety Promotion/Fall Prevention:   safety round/check completed   fall prevention program maintained  Taken 4/11/2023 0500 by Aryan Casas RN  Safety Promotion/Fall Prevention:   safety round/check completed   fall prevention program maintained  Taken 4/11/2023 0400 by Aryan Casas RN  Safety Promotion/Fall Prevention:   safety round/check completed   fall prevention program maintained  Taken 4/11/2023 0300 by Aryan Casas RN  Safety Promotion/Fall Prevention:   safety round/check completed   fall prevention program maintained  Taken 4/11/2023 0200 by Aryan Casas RN  Safety Promotion/Fall Prevention:   safety round/check completed   fall prevention program maintained  Taken 4/11/2023 0100 by Aryan Casas RN  Safety Promotion/Fall Prevention:   safety round/check completed   fall prevention program maintained  Taken 4/11/2023 0000 by Aryan Casas RN  Safety Promotion/Fall Prevention:   safety  round/check completed   fall prevention program maintained  Taken 4/10/2023 2300 by Aryan Casas RN  Safety Promotion/Fall Prevention:   safety round/check completed   fall prevention program maintained  Taken 4/10/2023 2200 by Aryan Casas RN  Safety Promotion/Fall Prevention:   safety round/check completed   fall prevention program maintained  Taken 4/10/2023 2100 by Aryan aCsas RN  Safety Promotion/Fall Prevention:   safety round/check completed   fall prevention program maintained  Taken 4/10/2023 2000 by Aryan Casas RN  Safety Promotion/Fall Prevention:   safety round/check completed   fall prevention program maintained  Taken 4/10/2023 1900 by Aryan Casas RN  Safety Promotion/Fall Prevention:   safety round/check completed   fall prevention program maintained     Problem: Noninvasive Ventilation Acute  Goal: Effective Unassisted Ventilation and Oxygenation  Outcome: Ongoing, Progressing  Intervention: Monitor and Manage Noninvasive Ventilation  Recent Flowsheet Documentation  Taken 4/11/2023 0400 by Aryan Casas RN  Airway/Ventilation Management: airway patency maintained  Taken 4/10/2023 2000 by Aryan Casas RN  Airway/Ventilation Management: airway patency maintained     Problem: Skin Injury Risk Increased  Goal: Skin Health and Integrity  Outcome: Ongoing, Progressing  Intervention: Optimize Skin Protection  Recent Flowsheet Documentation  Taken 4/11/2023 0600 by Aryan Casas RN  Head of Bed ([facility]) Positioning: [facility] elevated  Taken 4/11/2023 0400 by Aryan Casas RN  Pressure Reduction Techniques: frequent weight shift encouraged  Head of Bed ([facility]) Positioning: [facility] elevated  Pressure Reduction Devices: pressure-redistributing mattress utilized  Skin Protection:   adhesive use limited   skin-to-device areas padded   skin-to-skin areas padded   transparent dressing maintained   tubing/devices free from skin contact  Taken 4/11/2023 0200 by Aryan Casas RN  Head of Bed ([facility]) Positioning: [facility]  elevated  Taken 4/11/2023 0000 by Aryan Casas RN  Pressure Reduction Techniques: frequent weight shift encouraged  Head of Bed ([facility]) Positioning: [facility] elevated  Pressure Reduction Devices: pressure-redistributing mattress utilized  Skin Protection:   adhesive use limited   incontinence pads utilized   skin-to-skin areas padded   skin-to-device areas padded   tubing/devices free from skin contact   transparent dressing maintained  Taken 4/10/2023 2000 by Aryan Casas RN  Pressure Reduction Techniques: frequent weight shift encouraged  Head of Bed ([facility]) Positioning: [facility] elevated  Pressure Reduction Devices: pressure-redistributing mattress utilized  Skin Protection:   adhesive use limited   incontinence pads utilized   skin-to-skin areas padded   skin-to-device areas padded   tubing/devices free from skin contact   transparent dressing maintained   Goal Outcome Evaluation:

## 2023-04-11 NOTE — CASE MANAGEMENT/SOCIAL WORK
Continued Stay Note  [facility]     Patient Name: Elijah Escobar  MRN: [id number]  Today's Date: 4/11/2023    Admit Date: 3/31/2023    Plan: [facility] pending insurance approval.   Discharge Plan     Row Name 04/11/23 1534       Plan    Plan [facility] pending insurance approval.    Plan Comments [facility] in [place] has offered patient a bed pending insurance approval.  Facility will be starting insurance precert today.    Row Name 04/11/23 1232       Plan    Plan SNF placement    Plan Comments [facility] has declined admission.  Referral pending with [facility] in [place].               Discharge Codes    No documentation.                     TIANA Syed

## 2023-04-11 NOTE — DISCHARGE PLACEMENT REQUEST
"To: [facility]    From: Barbara SANTIAGO [phone]        Virgil Escobar (67 y.o. Male)     Date of Birth   1955    Social Security Number       Address   [address] [place] [address]    Home Phone   [phone]    MRN   [id number]       Baptism   Non-Voodoo    Marital Status                               Admission Date   3/31/23    Admission Type   Urgent    Admitting Provider   Flaco Portillo MD    Attending Provider   Flaco Portillo MD    Department, Room/Bed   [facility] INTENSIVE CARE, I003/1       Discharge Date       Discharge Disposition       Discharge Destination                               Attending Provider: Flaco Portillo MD    Allergies: No Known Allergies    Isolation: None   Infection: None   Code Status: CPR    Ht: 177.8 cm (70\")   Wt: 121 kg (267 lb 9.6 oz)    Admission Cmt: None   Principal Problem: Swelling of scalp [R22.0]                 Active Insurance as of 3/31/2023     Primary Coverage     Payor Plan Insurance Group Employer/Plan Group    ANTH MEDICARE REPLACEMENT ANTHEM MEDICARE ADVANTAGE [id number]     Payor Plan Address Payor Plan Phone Number Payor Plan Fax Number Effective Dates    [address] [phone]  1/1/2022 - None Entered    [facility] [id number]       Subscriber Name Subscriber Birth Date Member ID       VIRGIL ESCOBAR 1955 [id number]                 Emergency Contacts      (Rel.) Home Phone Work Phone Mobile Phone    Cathy Guzmán (Son) -- -- [phone]    Manju Lua (Relative) -- -- [phone]    LIMAMAYTE (Spouse) [phone] -- --    david lua (Sister) [phone] -- --    josie benavides (Sister) -- -- [phone]    Jeevan Regan [phone] -- --               History & Physical      Rahul Arnold APRN at 03/31/23 1228     Attestation signed by Flaco Portillo MD at 04/01/23 1486    Virgil Escobar is a 67 y.o. male with a significant medical " history of hypertension, diabetes, hyperlipidemia, seizures, craniotomy for tumor (6/16/2022), craniotomy for washout (7/1/2022), craniectomy (10/4/2022), coronary artery disease, diabetes, erectile dysfunction, GERD, hypertension, hyperlipidemia, tobacco abuse, and obesity.  He presents today with a complaint of a 4-5 days onset of progressively worsening right frontal, temporal, and periorbital edema and associated symptoms to include, but not limited to generalized fatigue, chills, dyspnea, intermittent nausea without vomiting, and states he's felt feverish.  Physical exam findings of neurologically intact with right frontal, temporal, and periorbital swelling.  WBC elevated at 15.  3 to an CRP elevated at 14.75.  Imaging pending.     Differential Diagnosis:   Pseudomeningocele versus recurrent infection cranial flap     Recommendations:  CSF appears to be clean  Awaiting cultures  PNA and on Abx per hospitalist  Atrial fibrilation with RVR and heart strain.  Appreciate Cards  Plan for removal of infected cranial flap early next week when more stable.                  NEUROSURGERY INITIAL HOSPITAL ENCOUNTER    Assessment/Plan:   Elijah Escobar is a 67 y.o. male with a significant medical history of hypertension, diabetes, hyperlipidemia, seizures, craniotomy for tumor (6/16/2022), craniotomy for washout (7/1/2022), craniectomy (10/4/2022), coronary artery disease, diabetes, erectile dysfunction, GERD, hypertension, hyperlipidemia, tobacco abuse, and obesity.  He presents today with a complaint of a 4-5 days onset of progressively worsening right frontal, temporal, and periorbital edema and associated symptoms to include, but not limited to generalized fatigue, chills, dyspnea, intermittent nausea without vomiting, and states he's felt feverish.  Physical exam findings of neurologically intact with right frontal, temporal, and periorbital swelling.  WBC elevated at 15.  3 to an CRP elevated at 14.75.  Imaging  pending.    Differential Diagnosis:   Pseudomeningocele versus recurrent intracranial infection    Recommendations:  Admit  Routine labs and blood cultures  CT of the head  MRI of the brain with and without  IR LP for CSF  Hold antibiotics until labs and CSF can be obtained.  Neuro exams per policy.  Call for decline.  NPO for now  Additional recommendations to be follow-up with Dr. Portillo.    Thank you very much for this interesting consult.   ___________________________________________________________________    Chief Complaint: Increased facial swelling    HPI: Elijah Escobar is a 67 y.o. male with a significant medical history of hypertension, diabetes, hyperlipidemia, seizures, craniotomy for tumor (6/16/2022), craniotomy for washout (7/1/2022), craniectomy (10/4/2022), coronary artery disease, diabetes, erectile dysfunction, GERD, hypertension, hyperlipidemia, tobacco abuse, and obesity.      On 6/16/2022 Mr. Escobar underwent right frontal craniotomy for removal of WHO grade I meningioma.  He initially did well postoperatively, however presented to [facility] ED on 6/30/2022 with complaints of persistent right-sided headache and dizziness with standing that was found to have intracranial infection necessitating incision/drainage and washout and craniotomy on 7/1/2022.  During this admission he was evaluated by infectious disease and placed on cefepime which he completed.  On 10/4/2022 he returned to the operating room for uneventful cranioplasty.  Mr. Escobar was evaluated in the emergency department on 1/23/2023 for worsening right frontal and orbital swelling.  WBC was slightly elevated at 13.40 and CT of the head shows no evidence of acute abnormalities, however there is a slight increase in extracranial and intracranial fluid adjacent to the cranial plate.    Elijah had been well since he was last evaluated on 1/25/2023 until approximately 4-5 days ago when he developed progressively worsening  right frontal, temporal, and periorbital edema. He additionally report generalized fatigue, chills, dyspnea, intermittent nausea without vomiting, and states he's felt feverish.  Elijah denies dizziness, seizure-like activity, confusion, facial asymmetry or dysesthesias and unilateral weakness.  He presents today in a wheelchair, however typically ambulates at home with a cane.  He does report falling from his shower chair 2 days ago, however denies hitting his head.    He currently rates the severity of his symptoms 8/10.  No additional concerns at this time.    Review of Systems   Constitutional: Positive for chills, fatigue and fever.   Eyes: Negative.  Negative for visual disturbance.   Respiratory: Negative.    Cardiovascular: Negative.    Gastrointestinal: Positive for nausea. Negative for vomiting.   Endocrine: Negative.    Genitourinary: Negative.    Musculoskeletal: Negative.    Skin: Negative.    Allergic/Immunologic: Negative.    Neurological: Positive for headaches. Negative for dizziness, tremors, seizures, syncope, facial asymmetry, speech difficulty, weakness, light-headedness and numbness.   Hematological: Negative.    Psychiatric/Behavioral: Negative.    All other systems reviewed and are negative.     Past Medical History:  has a past medical history of Brain tumor (HCC), Coronary artery disease, COVID-19 vaccine series completed, Diabetes (HCC), Erectile dysfunction, GERD (gastroesophageal reflux disease), Hypercholesteremia, Hypertension, and Seizure (HCC).    Past Surgical History:  has a past surgical history that includes Colonoscopy; Balloon angioplasty, artery (Right); Craniotomy for Tumor (Right, 6/16/2022); Craniectomy (Right, 7/1/2022); and Cranioplasty (Right, 10/4/2022).    Family History: family history includes Cancer in his mother; Heart disease in his father.    Social History:  reports that he has been smoking cigarettes. He has been smoking an average of .25 packs per day. He has  never used smokeless tobacco. He reports that he does not drink alcohol and does not use drugs.    Allergies: Patient has no known allergies.    Home Medications:   Current Facility-Administered Medications:   •  acetaminophen (TYLENOL) tablet 650 mg, 650 mg, Oral, Q4H PRN **OR** acetaminophen (TYLENOL) 160 MG/5ML solution 650 mg, 650 mg, Oral, Q4H PRN **OR** acetaminophen (TYLENOL) suppository 650 mg, 650 mg, Rectal, Q4H PRN, Rahul Arnold, ADITYA  •  amLODIPine (NORVASC) tablet 10 mg, 10 mg, Oral, Q24H, Rahul Arnold, APRN  •  aspirin chewable tablet 81 mg, 81 mg, Oral, Daily, Rahul Arnold, ADITYA  •  atorvastatin (LIPITOR) tablet 20 mg, 20 mg, Oral, Nightly, Rahul Arnold, APRN  •  butalbital-acetaminophen-caffeine (FIORICET, ESGIC) -40 MG per tablet 1 tablet, 1 tablet, Oral, Q6H PRN, Rahul Arnold, APRN  •  dextrose (D50W) (25 g/50 mL) IV injection 25 g, 25 g, Intravenous, Q15 Min PRN, Rahul Arnold, APRN  •  dextrose (GLUTOSE) oral gel 15 g, 15 g, Oral, Q15 Min PRN, Rahul Arnold, APRN  •  gabapentin (NEURONTIN) capsule 300 mg, 300 mg, Oral, TID, Rahul Arnold, APRN  •  glucagon (human recombinant) (GLUCAGEN DIAGNOSTIC) injection 1 mg, 1 mg, Intramuscular, Q15 Min PRN, Rahul Arnold, APRN  •  heparin (porcine) 5000 UNIT/ML injection 5,000 Units, 5,000 Units, Subcutaneous, Q12H, Rahul Arnold, APRN  •  Insulin Lispro (humaLOG) injection 0-24 Units, 0-24 Units, Subcutaneous, TID AC, Rahul Arnold, APRN  •  levETIRAcetam (KEPPRA) tablet 500 mg, 500 mg, Oral, BID, Rahul Arnold, APRN  •  lisinopril (PRINIVIL,ZESTRIL) tablet 20 mg, 20 mg, Oral, Daily, Rahul Arnold, APRN  •  metoprolol tartrate (LOPRESSOR) tablet 50 mg, 50 mg, Oral, Q12H, Rahul Arnold, APRN  •  nicotine (NICODERM CQ) 14 MG/24HR patch 1 patch, 1 patch, Transdermal, Daily PRN, Rahul Arnold, APRN  •  ondansetron (ZOFRAN) tablet 4 mg, 4 mg, Oral, Q6H PRN **OR** ondansetron (ZOFRAN) injection 4 mg, 4 mg, Intravenous, Q6H PRN, Rahul Arnold, APRN  •   oxyCODONE-acetaminophen (PERCOCET)  MG per tablet 1 tablet, 1 tablet, Oral, Q4H PRN, Rahul Arnold, APRN  •  oxyCODONE-acetaminophen (PERCOCET) 7.5-325 MG per tablet 1 tablet, 1 tablet, Oral, Q4H PRN, Rahul Arnold, APRN  •  [START ON 4/1/2023] pantoprazole (PROTONIX) EC tablet 40 mg, 40 mg, Oral, Q AM, Rahul Arnold, APRN  •  polyethylene glycol (MIRALAX) packet 17 g, 17 g, Oral, Daily, RusRahul farooq, APRN  •  sennosides-docusate (PERICOLACE) 8.6-50 MG per tablet 1 tablet, 1 tablet, Oral, Daily, Rahul Arnold, APRN  •  sodium chloride 0.9 % flush 10 mL, 10 mL, Intravenous, Q12H, Rahul Arnold, APRN  •  sodium chloride 0.9 % flush 10 mL, 10 mL, Intravenous, PRN, Rahul Arnold, APRN  •  sodium chloride 0.9 % infusion 40 mL, 40 mL, Intravenous, PRN, Rahul Arnold, APRN    Medications: Scheduled Meds:amLODIPine, 10 mg, Oral, Q24H  aspirin, 81 mg, Oral, Daily  atorvastatin, 20 mg, Oral, Nightly  gabapentin, 300 mg, Oral, TID  heparin (porcine), 5,000 Units, Subcutaneous, Q12H  insulin lispro, 0-24 Units, Subcutaneous, TID AC  levETIRAcetam, 500 mg, Oral, BID  lisinopril, 20 mg, Oral, Daily  metoprolol tartrate, 50 mg, Oral, Q12H  [START ON 4/1/2023] pantoprazole, 40 mg, Oral, Q AM  polyethylene glycol, 17 g, Oral, Daily  sennosides-docusate, 1 tablet, Oral, Daily  sodium chloride, 10 mL, Intravenous, Q12H      Continuous Infusions:   PRN Meds:.•  acetaminophen **OR** acetaminophen **OR** acetaminophen  •  butalbital-acetaminophen-caffeine  •  dextrose  •  dextrose  •  glucagon (human recombinant)  •  nicotine  •  ondansetron **OR** ondansetron  •  oxyCODONE-acetaminophen  •  oxyCODONE-acetaminophen  •  sodium chloride  •  sodium chloride    Vital Signs  Temp:  [98.5 °F (36.9 °C)] 98.5 °F (36.9 °C)  Heart Rate:  [79] 79  Resp:  [18] 18  BP: (117)/(98) 117/98    Physical Exam  Physical Exam  Vitals and nursing note reviewed.   Constitutional:       General: He is not in acute distress.     Appearance: Normal  appearance. He is well-developed and well-groomed. He is obese. He is not ill-appearing, toxic-appearing or diaphoretic.      Comments: BMI 37.16   HENT:      Head: Normocephalic and atraumatic.        Right Ear: Hearing normal.      Left Ear: Hearing normal.   Eyes:      Extraocular Movements: EOM normal.      Conjunctiva/sclera: Conjunctivae normal.      Pupils: Pupils are equal, round, and reactive to light.   Neck:      Trachea: Trachea normal.   Cardiovascular:      Rate and Rhythm: Normal rate and regular rhythm.   Pulmonary:      Effort: Pulmonary effort is normal. No tachypnea, bradypnea, accessory muscle usage or respiratory distress.   Abdominal:      Palpations: Abdomen is soft.   Musculoskeletal:      Cervical back: Full passive range of motion without pain and neck supple.   Skin:     General: Skin is warm and dry.   Neurological:      Mental Status: He is alert and oriented to person, place, and time.      GCS: GCS eye subscore is 4. GCS verbal subscore is 5. GCS motor subscore is 6.      Gait: Gait is intact.   Psychiatric:         Speech: Speech normal.         Behavior: Behavior normal. Behavior is cooperative.         Neurologic Exam     Mental Status   Oriented to person, place, and time.   Attention: normal. Concentration: normal.   Speech: speech is normal   Level of consciousness: alert    Cranial Nerves     CN II   Visual fields full to confrontation.     CN III, IV, VI   Pupils are equal, round, and reactive to light.  Extraocular motions are normal.     CN V   Facial sensation intact.     CN VII   Facial expression full, symmetric.     CN VIII   CN VIII normal.     CN IX, X   CN IX normal.     CN XI   CN XI normal.     Motor Exam   Right arm tone: normal  Left arm tone: normal  Right arm pronator drift: absent  Left arm pronator drift: absent  Right leg tone: normal  Left leg tone: normal    Strength   Right deltoid: 5/5  Left deltoid: 5/5  Right biceps: 5/5  Left biceps: 5/5  Right triceps:  5/5  Left triceps: 5/5  Right wrist extension: 5/5  Left wrist extension: 5/5  Right iliopsoas: 5/5  Left iliopsoas: 5/5  Right quadriceps: 5/5  Left quadriceps: 5/5  Right anterior tibial: 5/5  Left anterior tibial: 5/5  Right gastroc: 5/5  Left gastroc: 5/5  Right EHL 5/5  Left EHL 5/5       Sensory Exam   Right arm light touch: normal  Left arm light touch: normal  Right leg light touch: normal  Left leg light touch: normal    Gait, Coordination, and Reflexes     Gait  Gait: normal    Tremor   Resting tremor: absent  Intention tremor: absent  Action tremor: absent    Results Review:   Independent review and interpretation of imaging  Imaging Results (Last 24 Hours)     ** No results found for the last 24 hours. **        Lab Results   Component Value Date    WBC 15.32 (H) 03/31/2023    RBC 4.34 03/31/2023    HGB 13.5 03/31/2023    HCT 40.8 03/31/2023     03/31/2023     Lab Results   Component Value Date    GLUCOSE 243 (H) 03/31/2023    CREATININE 1.18 03/31/2023     03/31/2023    K 4.0 03/31/2023    ALT 11 03/31/2023    AST 30 03/31/2023    ALKPHOS 103 03/31/2023    EGFRIFNONA >60.0 10/21/2020     Lab Results   Component Value Date    CRP 14.75 (H) 03/31/2023    CRP 3.90 (H) 01/25/2023    CRP 0.32 12/05/2022    CRP <0.30 09/26/2022    CRP 0.41 07/21/2022    CRP 0.86 (H) 07/19/2022     MRI brain:  MRI spine:   CT Head:  CT c-spine:  CT t-spine:  CT l-spine:  X-ray:    I reviewed the patient's new clinical results.  Lab Results (last 24 hours)     ** No results found for the last 24 hours. **          ADITYA Cespedes          Electronically signed by Flaco Portillo MD at 04/01/23 0823         Current Facility-Administered Medications   Medication Dose Route Frequency Provider Last Rate Last Admin   • acetaminophen (TYLENOL) tablet 650 mg  650 mg Oral Q4H PRN Rahul Arnold APRN   650 mg at 04/10/23 1928    Or   • acetaminophen (TYLENOL) 160 MG/5ML solution 650 mg  650 mg Oral Q4H PRN Clayton,  ADITYA Zhu   650 mg at 04/11/23 1120    Or   • acetaminophen (TYLENOL) suppository 650 mg  650 mg Rectal Q4H PRN Rahul Arnold APRN       • aspirin chewable tablet 81 mg  81 mg Oral Daily Rahul Arnold APRN   81 mg at 04/11/23 0812   • atorvastatin (LIPITOR) tablet 20 mg  20 mg Oral Nightly Rahul Arnold APRN   20 mg at 04/10/23 2044   • budesonide-formoterol (SYMBICORT) 160-4.5 MCG/ACT inhaler 2 puff  2 puff Inhalation BID - RT Rahul Arnold APRN   2 puff at 04/11/23 0641   • butalbital-acetaminophen-caffeine (FIORICET, ESGIC) -40 MG per tablet 1 tablet  1 tablet Oral Q6H PRN Rahul Arnold APRN   1 tablet at 04/06/23 1938   • dextrose (D50W) (25 g/50 mL) IV injection 25 g  25 g Intravenous Q15 Min PRN Joey Moore DO       • dextrose (GLUTOSE) oral gel 15 g  15 g Oral Q15 Min PRN Joey Moore DO       • digoxin (LANOXIN) tablet 250 mcg  250 mcg Oral Daily Ramin Singh DO   250 mcg at 04/11/23 0926   • dilTIAZem CD (CARDIZEM CD) 24 hr capsule 300 mg  300 mg Oral Q24H Ramin Singh DO   300 mg at 04/11/23 0812   • Enoxaparin Sodium (LOVENOX) syringe 120 mg  1 mg/kg Subcutaneous Q12H Flaco Portillo MD   120 mg at 04/11/23 0813   • furosemide (LASIX) tablet 40 mg  40 mg Oral Daily Joey Moore DO   40 mg at 04/11/23 0812   • gabapentin (NEURONTIN) capsule 300 mg  300 mg Oral TID Rahul Arnold APRN   300 mg at 04/11/23 0812   • glucagon (human recombinant) (GLUCAGEN DIAGNOSTIC) injection 1 mg  1 mg Intramuscular Q15 Min PRN Joey Moore DO       • insulin detemir (LEVEMIR) injection 30 Units  30 Units Subcutaneous Q12H Joey Moore DO   30 Units at 04/11/23 0835   • Insulin Lispro (humaLOG) injection 0-14 Units  0-14 Units Subcutaneous TID AC Joey Moore, DO   3 Units at 04/11/23 1118   • ipratropium (ATROVENT) nebulizer solution 0.5 mg  0.5 mg Nebulization 4x Daily - RT Rahul Arnold APRN   0.5 mg at 04/11/23 1010   • levETIRAcetam (KEPPRA) tablet 500 mg   500 mg Oral BID Rahul Arnold APRN   500 mg at 04/11/23 0812   • meropenem (MERREM) 1 g in sodium chloride 0.9 % 100 mL IVPB  1 g Intravenous Q8H Edwin Santos MD   1 g at 04/11/23 0517   • methylnaltrexone (RELISTOR) injection 12 mg  12 mg Subcutaneous Every Other Day Rahul Arnold APRN   12 mg at 04/11/23 0812   • metoprolol tartrate (LOPRESSOR) tablet 50 mg  50 mg Oral Q12H Rahul Arnold APRN   50 mg at 04/11/23 0812   • nicotine (NICODERM CQ) 14 MG/24HR patch 1 patch  1 patch Transdermal Daily PRN Rahul Arnold APRN       • ondansetron (ZOFRAN) tablet 4 mg  4 mg Oral Q6H PRN Rahul Arnold APRN        Or   • ondansetron (ZOFRAN) injection 4 mg  4 mg Intravenous Q6H PRN Rahul Arnold APRN   4 mg at 04/11/23 0109   • oxyCODONE-acetaminophen (PERCOCET)  MG per tablet 1 tablet  1 tablet Oral Q4H PRN Rahul Arnold APRN       • oxyCODONE-acetaminophen (PERCOCET) 7.5-325 MG per tablet 1 tablet  1 tablet Oral Q4H PRN Rahul Arnold APRN       • pantoprazole (PROTONIX) EC tablet 40 mg  40 mg Oral Q AM Rahul Arnold APRN   40 mg at 04/11/23 0517   • Pharmacy to Dose enoxaparin (LOVENOX)   Does not apply Continuous PRN Rahul Arnold APRN       • polyethylene glycol (MIRALAX) packet 17 g  17 g Oral Daily Rahul Arnold APRN   17 g at 04/11/23 0812   • potassium chloride (MICRO-K) CR capsule 20 mEq  20 mEq Oral Daily Joey Moore DO   20 mEq at 04/11/23 0812   • sennosides-docusate (PERICOLACE) 8.6-50 MG per tablet 1 tablet  1 tablet Oral Daily Rahul Arnold APRN   1 tablet at 04/11/23 0812   • sodium chloride 0.9 % flush 10 mL  10 mL Intravenous Q12H Rahul Arnold APRN   10 mL at 04/11/23 0813   • sodium chloride 0.9 % flush 10 mL  10 mL Intravenous PRN Rahul Arnold APRN       • sodium chloride 0.9 % infusion 40 mL  40 mL Intravenous PRN Rahul Arnold APRN            Operative/Procedure Notes (all)      Flaco Portillo MD at 04/04/23 1102  Version 1 of 1         NEUROSURGERY OPERATIVE  NOTE    Elijah Escobar  OR Date: 4/4/2023    Pre-op Diagnosis:   S/P craniotomy [Z98.890]  Infection of deep incisional surgical site after procedure, initial encounter [T81.42XA]    Post-op Diagnosis:     Post-Op Diagnosis Codes:     * S/P craniotomy [Z98.890]     * Infection of deep incisional surgical site after procedure, initial encounter [T81.42XA]          Surgeon(s):  Flaco Portillo MD    Anesthesia: General    Staff:   Circulator: Ricyk Sams RN; Francoise Demarco RN  Scrub Person: Hesham Perry    Procedure(s):  CRANIOPLASTY, removal, right, horseshoe    INDICATIONS FOR PROCEDURE:   Elijah Escobar is a 67 y.o. male with a significant medical history of hypertension, diabetes, hyperlipidemia, seizures, craniotomy for tumor (6/16/2022), craniotomy for washout (7/1/2022), craniectomy (10/4/2022), coronary artery disease, diabetes, erectile dysfunction, GERD, hypertension, hyperlipidemia, tobacco abuse, and obesity.  He presents today with a complaint of a 4-5 days onset of progressively worsening right frontal, temporal, and periorbital edema and associated symptoms to include, but not limited to generalized fatigue, chills, dyspnea, intermittent nausea without vomiting, and states he's felt feverish.  Physical exam findings of neurologically intact with right frontal, temporal, and periorbital swelling.  WBC elevated at 15.  3 to an CRP elevated at 14.75.  MRI of the brain showed a subgaleal fluid collection surrounding his previous cranioplasty.  Additionally needle drainage of his frontal region was performed which showed positive for Serratia.  LP showed no evidence of CNS infection.    We discussed the risks of a  bifrontal removal of infected cranioplasty, including but not limited to infection, bleeding, damage to local structures, CSF leak, seizures, hydrocephalus, weakness, numbness, paralysis, or loss of bowel and bladder function, stroke, coma, MI, or death.    DESCRIPTION OF  OPERATION AND FINDINGS:   On the day of surgery, he was brought to the preoperative holding area where IV access was obtained. Prophylactic intravenous antibiotics were administered. He was then brought to the major operative suite. While on the [facility], the patient underwent an uneventful induction of general anesthetic with placement of endotracheal tube, TEDs, SCD hoses, and a Jeong catheter were applied.      The patient was placed in the supine position on a standard operating table.  All pressure points were inspected and appropriately padded, and he was secured to the table.  The head was turned slightly to the left. And the patient was placed in a Garland 3-point fixation head carter to at least 75lbs.      The patient was placed on a horseshoe head carter and secured.  The hair was then clipped along the incision line. The entire scalp was prepped with alcohol, Betadine, and DuraPrep and allowed to dry for 3 minutes.  He was then draped in the usual fashion.  At this point a WHO surgical timeout was performed with all members of the surgical team.      There was a small area just anterior to the tragus that a previously opened up and was draining delores purulent drainage.    The previous previous incision was opened with a 10 blade.  Bovie was then used to go to the periosteal layer.  Using a periosteum the previous bicoronal skin flap was elevated.  We then cut into the temporalis muscle and lifted anteriorly.  Delores purulence was encountered.  This was both on top of and underneath the cranioplasty.  A screwdriver was brought into the field and the bur hole covers were then removed from the skull.  All foreign bodies were removed completely and set aside.  Given that we had previously had positive cultures nothing was sent for further culture.    At this point the dura was inspected and found to be intact.  We carefully inspected all areas near sinuses and there appeared to be no connection  "between the sinuses.  Dry Ray-Mona's were then used to scrape off any exudate or bioburden.  We then irrigated with 2 L of normal saline.  We then placed vancomycin into the wound.  A 7 [place] flat JUAN was then tunneled from posterior and placed over the dura. The muscle fascia galea was closed with interrupted 2-0 Vicryl sutures.  4-0 Monocryl was used for skin closure.  Incision was dressed with Xeroform and the patient's head was wrapped..    There were no observed complications. The needle, sponge, and cottonoid counts were correct.        Estimated Blood Loss: 200ml    Complications: None    Implants:   Implant Name Type Inv. Item Serial No.  Lot No. LRB No. Used Action   CLIPAPPLR SCLP HEMO PRELD 1P/U STRL - [id number] Implant CLIPAPPLR SCLP HEMO PRELD 1P/U STRL  [employer]  N/A 1 Implanted   HEMOST ABS SURGICEL 4X8IN - [id number] Implant HEMOST ABS SURGICEL 4X8IN  [employer]  DIV OF J AND J [id number] N/A 1 Implanted   HEMOST ABS SURGIFOAM  8X12 10MM - [id number] Implant HEMOST ABS SURGIFOAM  8X12 10MM  [employer]  DIV OF J AND J [id number] N/A 1 Implanted   KT HEMOST ABS SURGIFOAM PORCN 1GRAM - [id number] Implant KT HEMOST ABS SURGIFOAM PORCN 1GRAM  [employer]  DIV OF J AND J [id number] N/A 1 Implanted       Specimens:                None      Drains:   Closed/Suction Drain 1 Right Scalp Bulb 7 Fr. (Active)           Electronically signed by Flaco Portillo MD at 04/04/23 1311          Physician Progress Notes (most recent note)      Lillian Pike MD at 04/11/23 0853          INFECTIOUS DISEASES PROGRESS NOTE    Patient:  Elijah Escobar  YOB: 1955  MRN: [id number]   Admit date: 3/31/2023   Admitting Physician: Flaco Portillo, *  Primary Care Physician: Brianna Martinez APRN    Chief Complaint: \"Did not sleep at all last night\"        Interval History: Patient reports he did not sleep last night because he needed to have a bowel movement.  He " "reports his nurse did give him something \"that helped\".      Allergies: No Known Allergies    Current Scheduled Medications:   aspirin, 81 mg, Oral, Daily  atorvastatin, 20 mg, Oral, Nightly  budesonide-formoterol, 2 puff, Inhalation, BID - RT  digoxin, 125 mcg, Oral, Daily  dilTIAZem CD, 300 mg, Oral, Q24H  enoxaparin, 1 mg/kg, Subcutaneous, Q12H  furosemide, 40 mg, Oral, Daily  gabapentin, 300 mg, Oral, TID  insulin detemir, 30 Units, Subcutaneous, Q12H  insulin lispro, 0-14 Units, Subcutaneous, TID AC  ipratropium, 0.5 mg, Nebulization, 4x Daily - RT  levETIRAcetam, 500 mg, Oral, BID  meropenem, 1 g, Intravenous, Q8H  methylnaltrexone, 12 mg, Subcutaneous, Every Other Day  metoprolol tartrate, 50 mg, Oral, Q12H  pantoprazole, 40 mg, Oral, Q AM  polyethylene glycol, 17 g, Oral, Daily  potassium chloride, 20 mEq, Oral, Daily  sennosides-docusate, 1 tablet, Oral, Daily  sodium chloride, 10 mL, Intravenous, Q12H      Current PRN Medications:  •  acetaminophen **OR** acetaminophen **OR** acetaminophen  •  butalbital-acetaminophen-caffeine  •  dextrose  •  dextrose  •  glucagon (human recombinant)  •  nicotine  •  ondansetron **OR** ondansetron  •  oxyCODONE-acetaminophen  •  oxyCODONE-acetaminophen  •  Pharmacy to Dose enoxaparin (LOVENOX)  •  sodium chloride  •  sodium chloride    Review of Systems   Constitutional: Negative for fever.   Skin: Negative for rash.       Objective     Vital Signs:  Temp (24hrs), Av.2 °F (36.8 °C), Min:97.2 °F (36.2 °C), Max:98.7 °F (37.1 °C)      /71 (BP Location: Left arm, Patient Position: Lying)   Pulse (!) 138   Temp 98 °F (36.7 °C) (Oral)   Resp 18   Ht 177.8 cm (70\")   Wt 121 kg (267 lb 9.6 oz)   SpO2 94%   BMI 38.40 kg/m²         Physical Exam   General: The patient is an obese male lying in bed initially sleeping when I walked in.  HEENT: Sclera anicteric and noninjected.  Incision clean dry and intact.  Respiratory: Effort even and unlabored, he was not " conversationally dyspneic  Neuro: Easily awakened from sleep.  Alert, oriented, speech clear  Psych: Pleasant cooperative    Line/IV site: IV site right upper extremity dated 4/3 however no surrounding erythema.  No tenderness to palpation around exit site.  No palpable cord    Results Review:    I reviewed the patient's new clinical results.    Lab Results:  CBC:   CBC w/diff        4/6/2023    03:52 4/7/2023    02:49 4/9/2023    03:17   CBC w/Diff   WBC 13.39   17.47   14.47     RBC 3.47   3.71   3.55     Hemoglobin 10.7   11.0   10.8     Hematocrit 31.6   34.5   33.3     MCV 91.1   93.0   93.8     MCH 30.8   29.6   30.4     MCHC 33.9   31.9   32.4     RDW 11.9   12.2   12.4     Platelets 220   246   229            CMP:   Lab Results   Lab 04/06/23  0831 04/07/23  0249 04/09/23  0317   SODIUM 137 137 140   POTASSIUM 3.4* 4.0 3.8   CHLORIDE 102 102 105   CO2 25.0 23.0 28.0   BUN 40* 41* 24*   CREATININE 1.28* 1.14 0.96   CALCIUM 7.9* 8.1* 7.7*   GLUCOSE 178* 240* 152*       Estimated Creatinine Clearance: 97.4 mL/min (by C-G formula based on SCr of 0.96 mg/dL).    Culture Results:    Microbiology Results (last 10 days)     Procedure Component Value - Date/Time    Miscellaneous Micro Request - Specimen Not Sent, Specimen Not Sent [[id number]] Resulted: 04/05/23 1427    Lab Status: Final result Specimen: Specimen Not Sent Updated: 04/05/23 1427     Extra Tube Completed    Respiratory Culture - Sputum, Cough [[id number]] Collected: 04/03/23 1826    Lab Status: Final result Specimen: Sputum from Cough Updated: 04/04/23 1359     Respiratory Culture Rejected     Gram Stain Rare (1+) WBCs seen      Few (2+) Epithelial cells seen      Few (2+) Yeast with hyphae seen      Rare (1+) Gram positive cocci    Narrative:      Specimen rejected due to oropharyngeal contamination. Please reorder and recollect specimen if clinically necessary.    Legionella Antigen, Urine - Urine, Urine, Clean Catch [[id number]]  (Normal) Collected:  04/02/23 2148    Lab Status: Final result Specimen: Urine, Clean Catch Updated: 04/02/23 2213     LEGIONELLA ANTIGEN, URINE Negative    S. Pneumo Ag Urine or CSF - Urine, Urine, Clean Catch [[id number]]  (Normal) Collected: 04/02/23 2148    Lab Status: Final result Specimen: Urine, Clean Catch Updated: 04/02/23 2214     Strep Pneumo Ag Negative    Respiratory Panel PCR w/COVID-19(SARS-CoV-2) [facility] In-House, NP Swab in UTM/VTM, 3-4 HR TAT - Swab, Nasopharynx [[id number]]  (Normal) Collected: 04/02/23 1512    Lab Status: Final result Specimen: Swab from Nasopharynx Updated: 04/02/23 1608     ADENOVIRUS, PCR Not Detected     Coronavirus 229E Not Detected     Coronavirus HKU1 Not Detected     Coronavirus NL63 Not Detected     Coronavirus OC43 Not Detected     COVID19 Not Detected     Human Metapneumovirus Not Detected     Human Rhinovirus/Enterovirus Not Detected     Influenza A PCR Not Detected     Influenza B PCR Not Detected     Parainfluenza Virus 1 Not Detected     Parainfluenza Virus 2 Not Detected     Parainfluenza Virus 3 Not Detected     Parainfluenza Virus 4 Not Detected     RSV, PCR Not Detected     Bordetella pertussis pcr Not Detected     Bordetella parapertussis PCR Not Detected     Chlamydophila pneumoniae PCR Not Detected     Mycoplasma pneumo by PCR Not Detected    Narrative:      In the setting of a positive respiratory panel with a viral infection PLUS a negative procalcitonin without other underlying concern for bacterial infection, consider observing off antibiotics or discontinuation of antibiotics and continue supportive care. If the respiratory panel is positive for atypical bacterial infection (Bordetella pertussis, Chlamydophila pneumoniae, or Mycoplasma pneumoniae), consider antibiotic de-escalation to target atypical bacterial infection.    MRSA Screen, PCR (Inpatient) - Swab, Nares [[id number]]  (Normal) Collected: 04/02/23 1512    Lab Status: Final result Specimen: Swab  from [place]a Updated: 04/02/23 1636     MRSA PCR No MRSA Detected    Narrative:      The negative predictive value of this diagnostic test is high and should only be used to consider de-escalating anti-MRSA therapy. A positive result may indicate colonization with MRSA and must be correlated clinically.    Wound Culture - Aspirate, Forehead [[id number]]  (Abnormal)  (Susceptibility) Collected: 04/01/23 1120    Lab Status: Edited Result - FINAL Specimen: Aspirate from Forehead Updated: 04/05/23 1426     Wound Culture Heavy growth (4+) Serratia marcescens     Gram Stain Many (4+) WBCs seen      No organisms seen    Susceptibility      Serratia marcescens      STALIN      Cefepime Susceptible      Ceftazidime Susceptible      Ceftriaxone Susceptible      Ertapenem Susceptible (C)  [1]       Gentamicin Susceptible      Levofloxacin Susceptible      Meropenem Susceptible (C)  [1]       Piperacillin + Tazobactam Susceptible  [2]       Tetracycline Resistant     Trimethoprim + Sulfamethoxazole Susceptible                   [1]  Appended report. These results have been appended to a previously final verified report.     [2]  Appended report. These results have been appended to a previously preliminary verified report.             Susceptibility Comments     Serratia marcescens    Cefazolin sensitivity will not be reported for Enterobacteriaceae in non-urine isolates. If cefazolin is preferred, please call the microbiology lab to request an E-test.  With the exception of urinary-sourced infections, aminoglycosides should not be used as monotherapy.                        Radiology:   Imaging Results (Last 72 Hours)     ** No results found for the last 72 hours. **          Assessment & Plan     Active Hospital Problems    Diagnosis    • **Swelling of scalp    • COPD (chronic obstructive pulmonary disease)    • Obesity (BMI 30-39.9)    • Tobacco abuse, in remission    • Acute systolic heart failure    • ADDIE (obstructive sleep  apnea)    • Type 2 myocardial infarction due to arrhythmia    • Acute pulmonary edema due to A-fib RVR    • Acute respiratory failure with hypoxia    • Post-operative infection    • Paroxysmal atrial fibrillation with rapid ventricular response    • Type 2 diabetes mellitus with hyperglycemia and peripheral neuropathy, with long-term current use of insulin (HCC)    • Coronary artery disease    • Meningioma s/p craniotomy        IMPRESSION:  1. Infection of deep incisional surgical site in patient status post right frontal cranioplasty status post craniectomy and washout and flap removal April 4 per Dr. Portillo  2. Serratia marcescens isolated in surgical cultures      RECOMMENDATION:   · Continue meropenem  · We will discuss with Dr. Santos possibly narrowing to ertapenem  · Current plan is to continue intravenous antibiotics through April 20 with conversion to oral antibiotics after that- quinolone or TMP sulfa      Lillian Pike MD  04/11/23  08:54 CDT        Electronically signed by Lillian Pike MD at 04/11/23 0913          Consult Notes (most recent note)      Edwin Santos MD at 04/05/23 1402      Consult Orders    1. Inpatient Infectious Diseases Consult [[id number]] ordered by Rahul Arnold APRN at 04/05/23 0811               INFECTIOUS DISEASES CONSULT NOTE    Patient:  Elijah Escobar 67 y.o. male  ROOM # I003/1  YOB: 1955  MRN: [id number]  CSN:  [id number]  Admit date: 3/31/2023   Admitting Physician: Flaco Portillo, *  Primary Care Physician: Brianna Martinez APRN  REFERRING PROVIDER: Flaco Portillo,*    Reason for Consultation: Extracranial infection. Recurrence.    History of Present Illness/Chief Complaint: 67-year-old man.  History of meningioma resection in July 2022.  He had had difficulties with infection with Serratia.  He received extended course of antibiotic treatment.  He had undergone debridement and subsequent cranioplasty.  He  apparently had been doing well until a few days prior to admission.  He developed some right frontal swelling and fluid accumulation.  He does not describe fever, headache, or other neurological symptoms.  He had fluid aspirated which again grew Serratia.  He underwent operative debridement of his previous craniotomy with removal of all foreign material.  Per review of the operative note purulent material was identified both above and below the cranioplasty.  Serratia was again recovered from preoperative aspirate.  He has been on treatment with ceftriaxone.  Infectious disease asked to evaluate and offer recommendations.    Current Scheduled Medications:   aspirin, 81 mg, Oral, Daily  atorvastatin, 20 mg, Oral, Nightly  budesonide-formoterol, 2 puff, Inhalation, BID - RT  cefTRIAXone, 1 g, Intravenous, Q24H  digoxin, 125 mcg, Oral, Daily  dilTIAZem CD, 240 mg, Oral, Q24H  enoxaparin, 1 mg/kg, Subcutaneous, Q12H  furosemide, 20 mg, Oral, Daily  gabapentin, 300 mg, Oral, TID  ipratropium, 0.5 mg, Nebulization, 4x Daily - RT  levETIRAcetam, 500 mg, Oral, BID  methylPREDNISolone sodium succinate, 20 mg, Intravenous, Q12H  metoprolol tartrate, 50 mg, Oral, Q12H  mupirocin, 1 application, Each Nare, BID  pantoprazole, 40 mg, Oral, Q AM  polyethylene glycol, 17 g, Oral, Daily  sennosides-docusate, 1 tablet, Oral, Daily  sodium chloride, 10 mL, Intravenous, Q12H  sodium chloride, 10 mL, Intravenous, Q12H    Current PRN Medications:  •  acetaminophen **OR** acetaminophen **OR** acetaminophen  •  butalbital-acetaminophen-caffeine  •  dextrose  •  dextrose  •  dextrose 5 % and sodium chloride 0.45 %  •  dextrose 5 % and sodium chloride 0.45 % with KCl 20 mEq/L  •  dextrose 5 % and sodium chloride 0.45 % with KCl 40 mEq/L  •  dextrose 5 % and sodium chloride 0.9 %  •  dextrose 5 % and sodium chloride 0.9 % with KCl 20 mEq  •  dextrose 5% and sodium chloride 0.9% with KCl 40 mEq/L  •  glucagon (human recombinant)  •   "nicotine  •  ondansetron **OR** ondansetron  •  oxyCODONE-acetaminophen  •  oxyCODONE-acetaminophen  •  Pharmacy to Dose enoxaparin (LOVENOX)  •  custom IV KCl infusion builder  •  sodium chloride  •  sodium chloride 0.45 % with KCl 20 mEq  •  sodium chloride  •  sodium chloride  •  sodium chloride  •  sodium chloride  •  sodium chloride  •  sodium chloride 0.9 % with KCl 20 mEq  •  sodium chloride 0.9 % with KCl 40 mEq/L    Allergies:  No Known Allergies     Past Medical History: History of craniotomy for grade 1 meningioma.  Orthostatic dizziness.  Postoperative craniotomy infection - Serratia Headache.  Gastroesophageal reflux disease.  Hypercholesterolemia.  Hypertension.  Diabetes mellitus.  Erectile dysfunction.  Coronary artery disease.     Past Surgical History: Previous craniotomy on 2022 for right-sided meningioma.  Previous angioplasty.     Social History: Smokes 1/2 pack/day of cigarettes.  No alcohol or illicit drug use.  .  Lives in [facility].  Retired.  Previously worked for the [employer].     Family History: Mother history of cancer.  Father history of heart disease.    Review of Systems he has had no cough, shortness of breath, or dyspnea.  Minimal nausea.  No vomiting, diarrhea, or rash.    Vital Signs:  /71   Pulse 87   Temp 98.2 °F (36.8 °C)   Resp 16   Ht 177.8 cm (70\")   Wt 119 kg (263 lb 4.8 oz)   SpO2 (!) 80%   BMI 37.78 kg/m²  Temp (24hrs), Av.9 °F (36.6 °C), Min:97.4 °F (36.3 °C), Max:98.2 °F (36.8 °C)    Physical Exam  Vital signs - reviewed.  Line/IV site - No erythema or tenderness.  Pleasant.  No distress.  Right frontal craniotomy dressing clean and dry.  Drain in place with small amount of serous slightly cloudy drainage  Lungs without crackles  Heart without murmur  Abdomen is soft and nontender  Moving all extremities  Does not appear to have any focal motor deficit  Intact sensation upper and lower extremities  Skin without drug " rash    Lab Results:  CBC: Results from last 7 days   Lab Units 04/04/23  0805 04/03/23  0436 04/02/23  0613 04/01/23  0435 03/31/23  1020   WBC 10*3/mm3 14.40* 11.00* 17.92* 19.05* 15.32*   HEMOGLOBIN g/dL 11.6* 11.0* 11.9* 13.3 13.5   HEMATOCRIT % 34.8* 34.5* 36.6* 42.2 40.8   PLATELETS 10*3/mm3 234 188 192 224 214     CMP:   Results from last 7 days   Lab Units 04/05/23  1207 04/05/23  0304 04/04/23  0745 04/03/23  0436 04/02/23  0613 04/01/23  0559 04/01/23  0435 03/31/23  1020   SODIUM mmol/L 132* 134* 136 139 135* 136 137 138   POTASSIUM mmol/L 3.5 3.7 3.3* 3.6 4.4 4.5 3.9 4.0   CHLORIDE mmol/L 96* 97* 98 102 99 97* 99 100   CO2 mmol/L 24.0 20.0* 21.0* 23.0 15.0* 19.0* 16.0* 23.0   BUN mg/dL 47* 46* 42* 36* 39* 35* 34* 22   CREATININE mg/dL 1.46* 1.42* 1.36* 1.16 1.17 1.26 1.20 1.18   CALCIUM mg/dL 7.9* 8.0* 8.3* 8.6 8.3* 9.0 8.7 8.9   BILIRUBIN mg/dL  --   --  0.5 0.7  --   --  1.2 1.0   ALK PHOS U/L  --   --  85 89  --   --  111 103   ALT (SGPT) U/L  --   --  10 11  --   --  11 11   AST (SGOT) U/L  --   --  13 22  --   --  28 30   GLUCOSE mg/dL 436* 408* 343* 102* 331* 281* 261* 243*     Forehead aspirate 4/1/2023:  Susceptibility   Serratia marcescens     STALIN     Cefepime <=1 ug/ml Susceptible     Ceftazidime <=1 ug/ml Susceptible     Ceftriaxone <=1 ug/ml Susceptible     Gentamicin <=1 ug/ml Susceptible     Levofloxacin <=0.12 ug/ml Susceptible     Piperacillin + Tazobactam  Susceptible 1     Tetracycline >=16 ug/ml Resistant     Trimethoprim + Sulfamethoxazole <=20 ug/ml Susceptible      Radiology:     MRI brain without contrast 3/31/2023:  IMPRESSION:  1. Limited exam. Patient is able to tolerate only axial diffusion  weighted, FLAIR, T1 sequences with a single sagittal T1 sequence. No  additional imaging or contrast administered due to patient unable to  tolerate study.  2. Right frontal cranioplasty changes. Increasing fluid collection  superficial to the cranioplasty flap within the scalp concerning  for  pseudomeningocele. Questionable small subdural hemorrhage deep to the  cranioplasty site. Mild hyperintense FLAIR signal noted within the  underlying right frontal lobe parenchyma.  This report was finalized on 03/31/2023 17:20 by Dr. Coleen Terry MD.    CT head without contrast 3/31/2023:  Summary:  1. Postsurgical changes with right frontal scalp fluid collection and a  small amount of right frontal ventricular cranial hemorrhage. No midline  shift or large intracranial hematoma.  This report was finalized on 03/31/2023 15:44 by Dr. Ghulam Cano MD.    Additional Studies Reviewed:      2D Echocardiogram 4/1/2023:  •  Left ventricular systolic function is mildly decreased. Left ventricular ejection fraction appears to be 46 - 50%.  •  Left ventricular wall thickness is consistent with mild to moderate concentric hypertrophy.  •  The following left ventricular wall segments are hypokinetic: apical anterior, apical lateral, apical inferior, apical septal, apex hypokinetic and mid anteroseptal.  •  Left atrial volume is mildly increased.  •  Estimated right ventricular systolic pressure from tricuspid regurgitation is mildly elevated (35-45 mmHg).  •  Normal size and function the right ventricle.  •  No significant (greater than mild) valvular pathology.  •  Mild dilation of the aortic root is present.  •  Compared to prior exam from 6/19/2022, both studies were technically difficult, but upon my independent review of the previous exam, there did appear to be some mild apical hypokinesis present on that study that does look slightly more prominent on current exam.    Impression:   Infection right frontal cranial flap/cranioplasty-Serratia recovered from culture.  Some Serratia process a chromosomally mediated beta-lactamase which can be expressed at times under treatment with cephalosporin leading to the development of resistance while on treatment.    Recommendations:    Would recommend treatment with  meropenem initially  Asking lab to report meropenem/ertapenem susceptibility  Anticipate 2 to 3-week course of intravenous antibiotic therapy followed by oral treatment  I do not think he could manage IV antibiotic treatment at home  May need temporary LTAC or subacute rehab placement for antimicrobial therapy, wound care, and physical therapy  Continue to follow    Edwin Jeffers MD  23  14:03 CDT            Electronically signed by Edwin Jeffers MD at 23 [id number]          Physical Therapy Notes (most recent note)      Carrie Callaway PTA at 23 0814  Version 1 of 1         Acute Care - Physical Therapy Treatment Note   [place]     Patient Name: Elijah Escobar  : 1955  MRN: [id number]  Today's Date: 2023      Visit Dx:     ICD-10-CM ICD-9-CM   1. S/P craniotomy  Z98.890 V45.89   2. Impaired mobility  Z74.09 799.89   3. Infection of deep incisional surgical site after procedure, initial encounter  T81.42XA 998.59     Patient Active Problem List   Diagnosis   • Meningioma s/p craniotomy   • Hypertension   • GERD (gastroesophageal reflux disease)   • Coronary artery disease   • Class 2 severe obesity due to excess calories with serious comorbidity and body mass index (BMI) of 38.0 to 38.9 in adult   • Type 2 diabetes mellitus with hyperglycemia and peripheral neuropathy, with long-term current use of insulin (HCC)   • Leukocytosis   • Other specified anemias   • Paroxysmal atrial fibrillation with rapid ventricular response   • Post-operative infection   • Smoker   • History of cranioplasty   • Facial edema   • Swelling of scalp   • Acute pulmonary edema due to A-fib RVR   • Acute respiratory failure with hypoxia   • Type 2 myocardial infarction due to arrhythmia   • COPD (chronic obstructive pulmonary disease)   • Obesity (BMI 30-39.9)   • Tobacco abuse, in remission   • Acute systolic heart failure   • ADDIE (obstructive sleep apnea)     Past Medical History:   Diagnosis Date   •  Brain tumor    • Coronary artery disease    • COVID-19 vaccine series completed     MODERNA X 3; LAST DOSE 3/2022   • Diabetes    • Erectile dysfunction    • GERD (gastroesophageal reflux disease)    • Hypercholesteremia    • Hypertension    • Seizure      Past Surgical History:   Procedure Laterality Date   • BALLOON ANGIOPLASTY, ARTERY Right    • COLONOSCOPY     • CRANIECTOMY Right 7/1/2022    Procedure: CRANIECTOMY WITH INCISIONAL WASHOUT;  Surgeon: Felipe Banerjee MD;  Location:  [facility] OR;  Service: Neurosurgery;  Laterality: Right;   • CRANIOPLASTY Right 10/4/2022    Procedure: CRANIOPLASTY right with Lumbar drain;  Surgeon: Flaco Portillo MD;  Location:  [facility] OR;  Service: Neurosurgery;  Laterality: Right;   • CRANIOPLASTY N/A 4/4/2023    Procedure: CRANIOPLASTY, removal, right, horseshoe;  Surgeon: Flaco Portillo MD;  Location:  [facility] OR;  Service: Neurosurgery;  Laterality: N/A;   • CRANIOTOMY FOR TUMOR Right 6/16/2022    Procedure: CRANIOTOMY FOR TUMOR STERIOTACTIC WITH BRAIN LAB, right;  Surgeon: Flaco Portillo MD;  Location:  [facility] OR;  Service: Neurosurgery;  Laterality: Right;     PT Assessment (last 12 hours)     PT Evaluation and Treatment     Row Name 04/11/23 0723          Physical Therapy Time and Intention    Subjective Information complains of;fatigue;pain  stomach upset  -JUAN     Document Type therapy note (daily note)  -     Mode of Treatment physical therapy  -     Row Name 04/11/23 0723          General Information    Existing Precautions/Restrictions fall   Helmet on when up  -JUAN     Row Name 04/11/23 0723          Pain    Posttreatment Pain Rating 4/10  -     Pain Location - head  -JUAN     Row Name 04/11/23 0723          Bed Mobility    Rolling Right [place] (Bed Mobility) independent  -JUAN     Supine-Sit [place] (Bed Mobility) independent  -JUAN     Comment, (Bed Mobility) flat bed  -JUAN     Row Name 04/11/23 0723          Sit-Stand Transfer     Sit-Stand [place] (Transfers) verbal cues;contact guard  -JUAN     Row Name 04/11/23 0723          Stand-Sit Transfer    Stand-Sit [place] (Transfers) verbal cues;contact guard  -JUAN     Row Name 04/11/23 0723          Toilet Transfer    Type (Toilet Transfer) sit-stand;stand-sit  -JUAN     [place] Level (Toilet Transfer) contact guard  -JUAN     Assistive Device (Toilet Transfer) commode chair  -JUAN     Row Name 04/11/23 0723          Gait/Stairs (Locomotion)    [place] Level (Gait) contact guard  -JUAN     Assistive Device (Gait) walker, front-wheeled  -JUAN     Distance in Feet (Gait) 130  -JUAN     Pattern (Gait) step-through  -JUAN     Deviations/Abnormal Patterns (Gait) stride length decreased;brittany decreased  -JUAN     Right Sided Gait Deviations --  heavy heal strike  -     Row Name 04/11/23 0723          Balance    Static Sitting Balance supervision  -     Dynamic Sitting Balance standby assist  -JUAN     Position, Sitting Balance sitting edge of bed  -     Row Name 04/11/23 0723          Hip (Therapeutic Exercise)    Hip AROM (Therapeutic Exercise) bilateral;flexion;aBduction;aDduction  -     Row Name 04/11/23 0723          Knee (Therapeutic Exercise)    Knee AROM (Therapeutic Exercise) bilateral;LAQ (long arc quad)  -     Row Name 04/11/23 0723          Ankle (Therapeutic Exercise)    Ankle AROM (Therapeutic Exercise) bilateral;dorsiflexion;plantarflexion  -     Row Name             Wound 04/04/23 1111 Right temporal region Incision    Wound - Properties Group Placement Date: 04/04/23  -CHRISTIANO Placement Time: 1111  -CHRISTIANO Side: Right  -CHRISTIANO Location: temporal region  -CHRISTIANO Primary Wound Type: Incision  -CHRISTIANO    [employer] Placement Date: 04/04/23  -CHRISTIANO Placement Time: 1111  -CHRISTIANO Side: Right  -CHRISTIANO Location: temporal region  -CHRISTIANO Primary Wound Type: Incision  -CHRISTIANO    [employer] Date first assessed: 04/04/23  -CHRISTIANO Time first assessed: 1111  -CHRISTIANO Side: Right  -CHRISTIANO  Location: temporal region  -CHRISTIANO Primary Wound Type: Incision  -CHRISTIANO    Row Name 04/11/23 0723          Positioning and Restraints    Pre-Treatment Position in bed  -JUAN     Post Treatment Position chair  -JUAN     In Chair notified nsg;reclined;call light within reach;encouraged to call for assist  -JUAN           User Key  (r) = Recorded By, (t) = Taken By, (c) = Cosigned By    Initials Name Provider Type    Carrie Clark PTA Physical Therapist Assistant    Ricky George, RN Registered Nurse                Physical Therapy Education     Title: PT OT SLP Therapies (In Progress)     Topic: Physical Therapy (In Progress)     Point: Mobility training (In Progress)     Learning Progress Summary           Patient Acceptance, E, NR by JUAN at 4/11/2023 0809    Comment: Looking ahead during gait    Acceptance, E,D, DU,VU by JUAN at 4/10/2023 1447    Comment: Safety with transfers, please take time, no impulsiveness    Acceptance, E,TB, VU by CM at 4/6/2023 1350    Acceptance, E, VU by SB at 4/5/2023 1605    Comment: helmet fit, safety, POC    Acceptance, E, VU by CHRISTIANO at 4/3/2023 1353    Comment: gait, safety,    Acceptance, E, VU by SB at 4/1/2023 1405    Comment: pt edu on POC, benefits of act, PLB and d/c plans   Family Acceptance, E,TB, VU by CM at 4/6/2023 1350                   Point: Home exercise program (Done)     Learning Progress Summary           Patient Acceptance, E,TB, VU by CM at 4/6/2023 1350   Family Acceptance, E,TB, VU by CM at 4/6/2023 1350                   Point: Body mechanics (Done)     Learning Progress Summary           Patient Acceptance, E,TB, VU by CM at 4/6/2023 1350   Family Acceptance, E,TB, VU by CM at 4/6/2023 1350                   Point: Precautions (Done)     Learning Progress Summary           Patient Acceptance, E,TB, VU by CM at 4/6/2023 1350    Acceptance, E, VU by SB at 4/5/2023 1605    Comment: helmet fit, safety, POC    Acceptance, E, VU by SB at 4/1/2023 1405    Comment: pt edu  on POC, benefits of act, PLB and d/c plans   Family Acceptance, E,TB, VU by CM at 4/6/2023 1350                               User Key     Initials Effective Dates Name Provider Type Discipline    JUAN 02/03/23 -  Carrie Callaway, PTA Physical Therapist Assistant PT    CHRISTIANO 02/03/23 -  Александр Saravia, PTA Physical Therapist Assistant PT    CM 03/02/23 -  Dianelys Carlton, RN Registered Nurse Nurse    SB 06/16/21 -  Erika Javier, PT DPT Physical Therapist PT              PT Recommendation and Plan         Outcome Measures     Row Name 04/11/23 0800 04/10/23 1400 04/09/23 0923       How much help from another person do you currently need...    Turning from your back to your side while in flat bed without using bedrails? 4  -JUAN 4  -JUAN 4  -WK    Moving from lying on back to sitting on the side of a flat bed without bedrails? 4  -JUAN 4  -JUAN 4  -WK    Moving to and from a bed to a chair (including a wheelchair)? 3  -JUAN 3  -JUAN 3  -WK    Standing up from a chair using your arms (e.g., wheelchair, bedside chair)? 4  -JUAN 4  -JUAN 4  -WK    Climbing 3-5 steps with a railing? 3  -JUAN 3  -JUAN 3  -WK    To walk in hospital room? 3  -JUAN 3  -JUAN 3  -WK    AM-PAC 6 Clicks Score (PT) 21  -JUAN 21  -JUAN 21  -WK       Functional Assessment    Outcome Measure Options -- -- AM-PAC 6 Clicks Basic Mobility (PT)  -WK    Row Name 04/08/23 0906             How much help from another person do you currently need...    Turning from your back to your side while in flat bed without using bedrails? 4  -WK      Moving from lying on back to sitting on the side of a flat bed without bedrails? 4  -WK      Moving to and from a bed to a chair (including a wheelchair)? 3  -WK      Standing up from a chair using your arms (e.g., wheelchair, bedside chair)? 4  -WK      Climbing 3-5 steps with a railing? 3  -WK      To walk in hospital room? 3  -WK      AM-PAC 6 Clicks Score (PT) 21  -WK         Functional Assessment    Outcome Measure Options AM-PAC 6 Clicks  Basic Mobility (PT)  -WK            User Key  (r) = Recorded By, (t) = Taken By, (c) = Cosigned By    Initials Name Provider Type    Carrie Clark PTA Physical Therapist Assistant    Genesis Steward PTA Physical Therapist Assistant                 Time Calculation:    PT Charges     Row Name 04/11/23 0813             Time Calculation    Start Time 0723  -JUAN      Stop Time 0754  -JUAN      Time Calculation (min) 31 min  -JUAN         Timed Charges    10694 - PT Therapeutic Exercise Minutes 15  -JUAN      29797 - Gait Training Minutes  16  -JUAN         Total Minutes    Timed Charges Total Minutes 31  -JUAN       Total Minutes 31  -JUAN            User Key  (r) = Recorded By, (t) = Taken By, (c) = Cosigned By    Initials Name Provider Type    Carrie Clark PTA Physical Therapist Assistant              Therapy Charges for Today     Code Description Service Date Service Provider Modifiers Qty    56870553033 HC GAIT TRAINING EA 15 MIN 4/10/2023 Carrie Callaway, PTA GP 1    28231715853 HC PT THER PROC EA 15 MIN 4/10/2023 Carrie Callaway, PTA GP 2    18566492199 HC GAIT TRAINING EA 15 MIN 4/11/2023 Carrie Callaway, PTA GP 1    91918204525 HC PT THER PROC EA 15 MIN 4/11/2023 Carrie Callaway, PTA GP 1          PT G-Codes  Outcome Measure Options: AM-PAC 6 Clicks Basic Mobility (PT)  AM-PAC 6 Clicks Score (PT): 21  AM-PAC 6 Clicks Score (OT): 22    Carrie Callaway PTA  4/11/2023      Electronically signed by Carrie Callaway PTA at 04/11/23 0814

## 2023-04-12 ENCOUNTER — ANESTHESIA EVENT (OUTPATIENT)
Dept: PERIOP | Facility: HOSPITAL | Age: 68
DRG: 3 | End: 2023-04-12
Payer: MEDICARE

## 2023-04-12 ENCOUNTER — ANESTHESIA (OUTPATIENT)
Dept: PERIOP | Facility: HOSPITAL | Age: 68
DRG: 3 | End: 2023-04-12
Payer: MEDICARE

## 2023-04-12 PROBLEM — S06.4X0A INTRACRANIAL EPIDURAL HEMATOMA: Status: ACTIVE | Noted: 2023-03-31

## 2023-04-12 PROCEDURE — 25010000002 PROPOFOL 10 MG/ML EMULSION: Performed by: NURSE ANESTHETIST, CERTIFIED REGISTERED

## 2023-04-12 PROCEDURE — 25010000002 FENTANYL CITRATE (PF) 100 MCG/2ML SOLUTION: Performed by: NURSE ANESTHETIST, CERTIFIED REGISTERED

## 2023-04-12 PROCEDURE — 25010000002 CEFAZOLIN PER 500 MG: Performed by: NURSE PRACTITIONER

## 2023-04-12 PROCEDURE — 25010000002 ONDANSETRON PER 1 MG: Performed by: NURSE ANESTHETIST, CERTIFIED REGISTERED

## 2023-04-12 RX ORDER — ROCURONIUM BROMIDE 10 MG/ML
INJECTION, SOLUTION INTRAVENOUS AS NEEDED
Status: DISCONTINUED | OUTPATIENT
Start: 2023-04-12 | End: 2023-04-12 | Stop reason: SURG

## 2023-04-12 RX ORDER — GLYCOPYRROLATE 0.2 MG/ML
INJECTION INTRAMUSCULAR; INTRAVENOUS AS NEEDED
Status: DISCONTINUED | OUTPATIENT
Start: 2023-04-12 | End: 2023-04-12 | Stop reason: SURG

## 2023-04-12 RX ORDER — LIDOCAINE HYDROCHLORIDE 20 MG/ML
INJECTION, SOLUTION EPIDURAL; INFILTRATION; INTRACAUDAL; PERINEURAL AS NEEDED
Status: DISCONTINUED | OUTPATIENT
Start: 2023-04-12 | End: 2023-04-12 | Stop reason: SURG

## 2023-04-12 RX ORDER — NEOSTIGMINE METHYLSULFATE 5 MG/5 ML
SYRINGE (ML) INTRAVENOUS AS NEEDED
Status: DISCONTINUED | OUTPATIENT
Start: 2023-04-12 | End: 2023-04-12 | Stop reason: SURG

## 2023-04-12 RX ORDER — PROPOFOL 10 MG/ML
VIAL (ML) INTRAVENOUS AS NEEDED
Status: DISCONTINUED | OUTPATIENT
Start: 2023-04-12 | End: 2023-04-12 | Stop reason: SURG

## 2023-04-12 RX ORDER — ONDANSETRON 2 MG/ML
INJECTION INTRAMUSCULAR; INTRAVENOUS AS NEEDED
Status: DISCONTINUED | OUTPATIENT
Start: 2023-04-12 | End: 2023-04-12 | Stop reason: SURG

## 2023-04-12 RX ORDER — FENTANYL CITRATE 50 UG/ML
INJECTION, SOLUTION INTRAMUSCULAR; INTRAVENOUS AS NEEDED
Status: DISCONTINUED | OUTPATIENT
Start: 2023-04-12 | End: 2023-04-12 | Stop reason: SURG

## 2023-04-12 RX ADMIN — FENTANYL CITRATE 100 MCG: 50 INJECTION INTRAMUSCULAR; INTRAVENOUS at 16:02

## 2023-04-12 RX ADMIN — CEFAZOLIN 2 G: 2 INJECTION, POWDER, FOR SOLUTION INTRAMUSCULAR; INTRAVENOUS at 16:14

## 2023-04-12 RX ADMIN — PROPOFOL INJECTABLE EMULSION 130 MG: 10 INJECTION, EMULSION INTRAVENOUS at 16:02

## 2023-04-12 RX ADMIN — Medication 4 MG: at 16:45

## 2023-04-12 RX ADMIN — GLYCOPYRROLATE 0.2 MCG: 0.2 INJECTION INTRAMUSCULAR; INTRAVENOUS at 16:45

## 2023-04-12 RX ADMIN — ONDANSETRON 4 MG: 2 INJECTION INTRAMUSCULAR; INTRAVENOUS at 16:48

## 2023-04-12 RX ADMIN — ROCURONIUM BROMIDE 30 MG: 10 INJECTION, SOLUTION INTRAVENOUS at 16:02

## 2023-04-12 RX ADMIN — LIDOCAINE HYDROCHLORIDE 100 MG: 20 INJECTION, SOLUTION EPIDURAL; INFILTRATION; INTRACAUDAL; PERINEURAL at 16:02

## 2023-04-12 NOTE — CASE MANAGEMENT/SOCIAL WORK
Continued Stay Note  [facility]     Patient Name: Elijah Escobar  MRN: [id number]  Today's Date: 4/12/2023    Admit Date: 3/31/2023    Plan: [facility] pending insurance approval.   Discharge Plan     Row Name 04/12/23 1243       Plan    Plan Comments Precert is complete and bed is ready at [facility]. Message sent to Physician and APRN to inform of bed. Pt does not need a new covid test. Will follow to see if pt is ready for dc.    Final Discharge Disposition Code 03 - skilled nursing facility (SNF)               Discharge Codes    No documentation.               Expected Discharge Date and Time     Expected Discharge Date Expected Discharge Time    Apr 14, 2023             JOO Quinteros

## 2023-04-12 NOTE — PLAN OF CARE
Goal Outcome Evaluation:  Plan of Care Reviewed With: patient        Progress: improving  Outcome Evaluation: a/ox3, disoriented to situation, intermit confusion, vss, tele, 2-4L NC based on activity requires more o2, tolerating c-carb diet for breakfast, NPO now for neurosurgery, intermit incont of b/b, urinal use, oob stdby assist, pain controlled with PO PRN meds, hold lovenox, plans for surgery today.

## 2023-04-12 NOTE — OP NOTE
NEUROSURGERY OPERATIVE NOTE    Elijah Escobar  OR Date: 4/12/2023    Pre-op Diagnosis:   Intracranial epidural hematoma [S06.4X0A]    Post-op Diagnosis:     Post-Op Diagnosis Codes:     * Intracranial epidural hematoma [S06.4X0A]          Surgeon(s):  Flaco Portillo MD    Anesthesia: General    Staff:   Circulator: Kierra Irby RN  Scrub Person: Malgorzata Montiel; Lila Merrill    Procedure(s):  Evacuation of scalp hematoma    Elijah Escobar is a 67 y.o. male with a significant medical history of hypertension, diabetes, hyperlipidemia, seizures, craniotomy for tumor (6/16/2022), craniotomy for washout (7/1/2022), craniectomy (10/4/2022), coronary artery disease, diabetes, erectile dysfunction, GERD, hypertension, hyperlipidemia, tobacco abuse, and obesity.  He presents today with a complaint of a 4-5 days onset of progressively worsening right frontal, temporal, and periorbital edema and associated symptoms to include, but not limited to generalized fatigue, chills, dyspnea, intermittent nausea without vomiting, and states he's felt feverish.  Physical exam findings of neurologically intact with right frontal, temporal, and periorbital swelling.  WBC elevated at 15.  3 to an CRP elevated at 14.75.  Imaging pending.    Procedure description:  Patient was brought to the main operating room.  He was transferred onto the hospital bed and endotracheal intubation and general anesthesia was performed.  WHO timeout was performed.  We began by opening the prior incision.  10 blade was used to cut to the incision which should become well-healed.  Sutures were then cut and removed.  Brisk bleeding was quickly encountered.  A large volume of clot was found in the epidural space.  [place] was then used to elevate the flap.  Suction irrigation were used to remove approximately 90% of the clot leaving some adherent clot in place.  No significant bleeding was encountered at the end of the case.  A 7 [place] flat  JUAN was then placed into the epidural space and tunneled posteriorly.  The scalp was then closed with 2-0 Vicryl and a running 4-0 Monocryl.  All counts were noted be correct at the end the case.  No evident complications.  Patient was awoken and found to be at his neurologic baseline.    Estimated Blood Loss: 50 mL    Complications: None    Implants:   Implant Name Type Inv. Item Serial No.  Lot No. LRB No. Used Action   HEMOST ABS SURGICEL 4X8IN - [id number] Implant HEMOST ABS SURGICEL 4X8IN  [employer] [id number] Right 1 Implanted   HEMOST ABS SURGIFOAM  8X12 10MM - [id number] Implant HEMOST ABS SURGIFOAM  8X12 10MM  [employer]  [employer] J [id number] Right 1 Implanted   KT HEMOST ABS SURGIFOAM PORCN 1GRAM - [id number] Implant KT HEMOST ABS SURGIFOAM PORCN 1GRAM  [employer]  [employer] J [id number] Right 1 Implanted       Specimens:                None      Drains:   Closed/Suction Drain 1 Right Scalp Bulb (Active)       [REMOVED] Closed/Suction Drain 1 Right Scalp Bulb 7 Fr. (Removed)   Site Description Unable to view 04/10/23 1600   Dressing Status Clean;Dry;Intact 04/10/23 1600   Drainage Appearance Serosanguineous 04/10/23 1600   Status To bulb suction 04/10/23 1600   Output (mL) 5 mL 04/10/23 0400

## 2023-04-12 NOTE — PROGRESS NOTES
Infectious Diseases Progress Note    Patient:  Elijah Escobar  YOB: 1955  MRN: [id number]   Admit date: 3/31/2023   Admitting Physician: Flaco Portillo, *  Primary Care Physician: Brianna Martinez APRN    Chief Complaint/Interval History: He has been offered a bed at [facility] to complete his antibiotic treatment.  He is tolerating his therapy with meropenem without side effect.  He has no headache.  No new neurological symptoms.  No diarrhea, rash, or skin itching.  Interval notes reviewed.    Intake/Output Summary (Last 24 hours) at 4/12/2023 0647  Last data filed at 4/11/2023 1835  Gross per 24 hour   Intake 200 ml   Output --   Net 200 ml     Allergies: No Known Allergies  Current Scheduled Medications:   aspirin, 81 mg, Oral, Daily  atorvastatin, 20 mg, Oral, Nightly  budesonide-formoterol, 2 puff, Inhalation, BID - RT  digoxin, 250 mcg, Oral, Daily  dilTIAZem CD, 300 mg, Oral, Q24H  enoxaparin, 1 mg/kg, Subcutaneous, Q12H  furosemide, 40 mg, Oral, Daily  gabapentin, 300 mg, Oral, TID  insulin detemir, 30 Units, Subcutaneous, Q12H  insulin lispro, 0-14 Units, Subcutaneous, TID AC  ipratropium, 0.5 mg, Nebulization, 4x Daily - RT  levETIRAcetam, 500 mg, Oral, BID  meropenem, 1 g, Intravenous, Q8H  methylnaltrexone, 12 mg, Subcutaneous, Every Other Day  metoprolol tartrate, 50 mg, Oral, Q12H  pantoprazole, 40 mg, Oral, Q AM  polyethylene glycol, 17 g, Oral, Daily  potassium chloride, 20 mEq, Oral, Daily  sennosides-docusate, 1 tablet, Oral, Daily  sodium chloride, 10 mL, Intravenous, Q12H      Current PRN Medications:  •  acetaminophen **OR** acetaminophen **OR** acetaminophen  •  butalbital-acetaminophen-caffeine  •  dextrose  •  dextrose  •  glucagon (human recombinant)  •  nicotine  •  ondansetron **OR** ondansetron  •  oxyCODONE-acetaminophen  •  oxyCODONE-acetaminophen  •  Pharmacy to Dose enoxaparin (LOVENOX)  •  sodium chloride  •  sodium chloride    Review of  "Systems see HPI    Vital Signs:  /56 (BP Location: Right arm, Patient Position: Lying)   Pulse 91   Temp 97.8 °F (36.6 °C) (Oral)   Resp 18   Ht 177.8 cm (70\")   Wt 123 kg (270 lb 4.5 oz)   SpO2 93%   BMI 38.78 kg/m²     Physical Exam  Vital signs - reviewed.  Line/IV site - No  warmth, induration, or tenderness.  Craniotomy incision well approximated.  No drainage.  Skin without rash  General he looks comfortable and in no distress    Lab Results:  CBC:   Results from last 7 days   Lab Units 04/09/23 0317 04/07/23 0249 04/06/23  0352   WBC 10*3/mm3 14.47* 17.47* 13.39*   HEMOGLOBIN g/dL 10.8* 11.0* 10.7*   HEMATOCRIT % 33.3* 34.5* 31.6*   PLATELETS 10*3/mm3 229 246 220     BMP:  Results from last 7 days   Lab Units 04/09/23 0317 04/07/23  0249 04/06/23  0831 04/06/23  0352 04/05/23  1939 04/05/23  1519 04/05/23  1207   SODIUM mmol/L 140 137 137 136 135* 134* 132*   POTASSIUM mmol/L 3.8 4.0 3.4* 3.5 3.5 3.6 3.5   CHLORIDE mmol/L 105 102 102 102 99 97* 96*   CO2 mmol/L 28.0 23.0 25.0 23.0 23.0 23.0 24.0   BUN mg/dL 24* 41* 40* 43* 46* 45* 47*   CREATININE mg/dL 0.96 1.14 1.28* 1.19 1.50* 1.49* 1.46*   GLUCOSE mg/dL 152* 240* 178* 159* 205* 331* 436*   CALCIUM mg/dL 7.7* 8.1* 7.9* 7.9* 8.0* 8.0* 7.9*     Culture Results:   Blood Culture   Date Value Ref Range Status   03/31/2023 No growth at 5 days   Final   03/31/2023 No growth at 5 days   Final            Urine Culture   Date Value Ref Range Status   03/31/2023 No growth   Final            Wound Culture   Date Value Ref Range Status   04/01/2023 Heavy growth (4+) Serratia marcescens (A)   Final      Forehead aspirate 4/1/2023:  Susceptibility                Serratia marcescens       STALIN       Cefepime <=1 ug/ml Susceptible       Ceftazidime <=1 ug/ml Susceptible       Ceftriaxone <=1 ug/ml Susceptible       Gentamicin <=1 ug/ml Susceptible       Levofloxacin <=0.12 ug/ml Susceptible       Piperacillin + Tazobactam   Susceptible 1       Tetracycline " >=16 ug/ml Resistant       Trimethoprim + Sulfamethoxazole <=20 ug/ml Susceptible       Radiology: None  Additional Studies Reviewed: None    Impression:    Infection right frontal cranioplasty.  Serratia recovered on culture.  He had had purulent material above and below his flap.  All foreign material removed.    Recommendations:   Continue IV antibiotic treatment  IV antibiotic recommendations outlined below  He has been offered a bed at [facility] antibiotic recommendations:  1.  Diagnosis-infection right frontal cranioplasty-purulent material above and below flap but all findings extradural.  Serratia marcescens recovered on culture.  2.  Neurosurgeon-Kyrie Portillo MD  3.  Antibiotic recommendation:  Meropenem 1 g IV every 8 hours through April 20, 2023  OR  Ertapenem 1 g IV daily through April 20, 2023  (Nursing home to choose which antibiotic would be the best option for them from frequency of infusion and cost standpoint)  Discontinue meropenem or ertapenem after last doses on April 20, 2023  AND  Begin levofloxacin 500 mg orally daily for 2 weeks beginning April 21, 2023  4.  Follow-up appointment with infectious diseases in 2 to 3 weeks    Edwin Jeffers MD

## 2023-04-12 NOTE — PROGRESS NOTES
[facility]  INPATIENT PROGRESS NOTE    Patient Name: Elijah Escobar  Date of Admission: 3/31/2023  Today's Date: 04/12/23  Length of Stay: 12  Primary Care Physician: Brianna Martinez APRN    Subjective   Chief Complaint: No complaint  HPI     The patient has no complaint today.  A compressive hematoma has apparently developed under the skin flap postoperatively.  Plan is for neurosurgery to return the patient to the OR today for evacuation of the hematoma.  White blood cell count has normalized.  Hemoglobin stable 11.1.  The patient's heart rate is improved after increasing digoxin dosing.  He remains on long-acting Cardizem as well for rate control.    Review of Systems   All pertinent negatives and positives are as above. All other systems have been reviewed and are negative unless otherwise stated.     Objective    Temp:  [97.8 °F (36.6 °C)-98.2 °F (36.8 °C)] 98.2 °F (36.8 °C)  Heart Rate:  [] 97  Resp:  [14-20] 18  BP: (105-135)/(47-95) 115/54  Physical Exam  Constitutional:       Appearance: He is obese. He is ill-appearing (chronically).      Comments:  No distress.  No family present.   HENT:      Head: Normocephalic.      Comments: Postop dressing around head removed.  Palpable softness under scalp flap noted, consistent with hematoma.  Eyes:      Conjunctiva/sclera: Conjunctivae normal.    Cardiovascular:      Rate and Rhythm: Normal rate. Rhythm irregularly irregular and variable from .      Heart sounds: Normal heart sounds.   Pulmonary:      Effort: Pulmonary effort is normal. No respiratory distress.      Breath sounds: No wheezing or rales.   Abdominal:      General: Bowel sounds are normal. There is no distension.      Palpations: Abdomen is soft.      Tenderness: There is no abdominal tenderness.   Musculoskeletal:         General: Swelling (trace) present. No tenderness or deformity. Normal range of motion.   Skin:     General:  Skin is warm and dry.      Findings: No rash.   Neurological:      Mental Status: He is alert and oriented to person, place, and time.       Motor: No abnormal muscle tone.   Psychiatric:         Mood and Affect: Mood normal.         Behavior: Behavior normal.         Thought Content: Thought content normal.         Judgment: Judgment normal.     Results Review:  I have reviewed the labs, radiology results, and diagnostic studies.    Laboratory Data:   Results from last 7 days   Lab Units 04/12/23 0939 04/09/23 0317 04/07/23 0249   WBC 10*3/mm3 10.00 14.47* 17.47*   HEMOGLOBIN g/dL 11.1* 10.8* 11.0*   HEMATOCRIT % 36.1* 33.3* 34.5*   PLATELETS 10*3/mm3 209 229 246        Results from last 7 days   Lab Units 04/12/23 0939 04/09/23 0317 04/07/23 0249   SODIUM mmol/L 142 140 137   POTASSIUM mmol/L 4.1 3.8 4.0   CHLORIDE mmol/L 105 105 102   CO2 mmol/L 27.0 28.0 23.0   BUN mg/dL 16 24* 41*   CREATININE mg/dL 0.94 0.96 1.14   CALCIUM mg/dL 8.0* 7.7* 8.1*   BILIRUBIN mg/dL 0.4  --   --    ALK PHOS U/L 87  --   --    ALT (SGPT) U/L 29  --   --    AST (SGOT) U/L 25  --   --    GLUCOSE mg/dL 135* 152* 240*       Culture Data:   No results found for: BLOODCX, URINECX, WOUNDCX, MRSACX, RESPCX, STOOLCX    Radiology Data:   Imaging Results (Last 24 Hours)     Procedure Component Value Units Date/Time    CT Head Without Contrast [[id number]] Collected: 04/12/23 0929     Updated: 04/12/23 0933    Narrative:      EXAM: CT HEAD WO CONTRAST- - 4/12/2023 9:16 AM CDT     HISTORY: Increased right frontal fluid accumulation; Z98.890-Other  specified postprocedural states; Z74.09-Other reduced mobility;  T81.42XA-Infection following a procedure, deep incisional surgical site,  initial encounter       COMPARISON: 4/4/2023.      DOSE LENGTH PRODUCT: 797 mGy cm. Automated exposure control was also  utilized to decrease patient radiation dose.     TECHNIQUE: Unenhanced CT images obtained from vertex to skull base with  multiplanar  reformats.     FINDINGS:  RIGHT frontal craniectomy. There is a 10.0 x 3.1 cm heterogeneous  superficial mass suggesting scalp hematoma overlying the craniectomy  with mass effect at the RIGHT frontal lobe. Similar cortical and  subcortical hypodensity at the RIGHT frontal lobe compared to 4/4/2023.  However, there is mildly increased RIGHT frontal lobe sulcal effacement  and mild mass effect on the anterior horn RIGHT lateral ventricle. New 4  mm RIGHT to LEFT midline shift.     Except as described above, ventricles have similar size compared to  4/4/2023. Basilar cisterns patent. Gray-white matter differentiation  maintained in the remaining cerebral parenchyma. Punctate RIGHT basilar  calcification.     Globes, retrobulbar soft tissues, paranasal sinuses, mastoid air cells  and external auditory canals are within normal limits.           Impression:      1. RIGHT frontal craniectomy with new 10.0 x 3.1 cm heterogeneous  superficial mass, appearance most suggestive of hematoma. Mass effect on  the RIGHT frontal lobe with new RIGHT to LEFT 4 mm midline shift, mild  mass effect on the anterior horn RIGHT lateral ventricle and mild RIGHT  frontal lobe sulcal effacement.  2. Similar RIGHT frontal cortical and subcortical hypodensity.     Result communicated to ADITYA Gallego at 9:28 AM on 4/12/2023.  This report was finalized on 04/12/2023 09:30 by Dr Avani Alvarenga MD.          I have reviewed the patient's current medications.     Assessment/Plan   Assessment  Active Hospital Problems    Diagnosis    • **Swelling of scalp    • COPD (chronic obstructive pulmonary disease)    • Obesity (BMI 30-39.9)    • Tobacco abuse, in remission    • Acute systolic heart failure    • ADDIE (obstructive sleep apnea)    • Type 2 myocardial infarction due to arrhythmia    • Acute pulmonary edema due to A-fib RVR    • Acute respiratory failure with hypoxia    • Intracranial epidural hematoma    • Post-operative infection    •  Paroxysmal atrial fibrillation with rapid ventricular response    • Type 2 diabetes mellitus with hyperglycemia and peripheral neuropathy, with long-term current use of insulin (HCC)    • Coronary artery disease    • Meningioma s/p craniotomy        Treatment Plan  Continue rate controlling medications with digoxin and Cardizem  Patient to return to the OR today for evacuation of scalp hematoma    Medical Decision Making  Number and Complexity of problems:   1) respiratory failure with hypoxia secondary to pulmonary edema which is secondary to A-fib RVR, acute, moderate complexity  2) PAF with RVR, acute, moderate complexity  3) meningioma versus intracranial infection, acute, high complexity  4) type 2 diabetes, chronic, moderate complexity     Differential Diagnosis: Pulmonary embolus, LV dysfunction     Conditions and Status        Condition is unchanged.     [facility] Data  External documents reviewed: Care everywhere documentation  Cardiac tracing (EKG, telemetry) interpretation: See HPI  Radiology interpretation: See HPI  Labs reviewed: See HPI  Any tests that were considered but not ordered: None     Decision rules/scores evaluated (example [id number], Wells, etc): [id number] score of 5 with a 7.2% risk of stroke annually     Discussed with: The patient and nursing     Care Planning  Shared decision making: The patient  Code status and discussions: Full code     Disposition  Social Determinants of Health that impact treatment or disposition: None  I expect the patient to be discharged by the primary service.    Electronically signed by Ramin Singh DO, 04/12/23, 12:06 CDT.

## 2023-04-12 NOTE — ANESTHESIA PROCEDURE NOTES
Airway  Date/Time: 4/12/2023 4:04 PM  Airway not difficult    General Information and Staff    Patient location during procedure: OR  CRNA/CAA: Awais Purvis CRNA    Indications and Patient Condition  Indications for airway management: airway protection    Preoxygenated: yes  Mask difficulty assessment: 0 - not attempted    Final Airway Details  Final airway type: endotracheal airway      Successful airway: ETT  Cuffed: yes   Successful intubation technique: direct laryngoscopy  Endotracheal tube insertion site: oral  Blade: Ramona  Blade size: 3.5  ETT size (mm): 7.5  Cormack-Lehane Classification: grade I - full view of glottis  Placement verified by: chest auscultation and capnometry   Cuff volume (mL): 6  Measured from: lips  ETT/EBT  to lips (cm): 21  Number of attempts at approach: 1  Assessment: lips, teeth, and gum same as pre-op and atraumatic intubation    Additional Comments  ATRAUMATIC INTUBATION

## 2023-04-12 NOTE — ANESTHESIA PREPROCEDURE EVALUATION
Anesthesia Evaluation     Patient summary reviewed and Nursing notes reviewed   no history of anesthetic complications:  NPO Solid Status: > 8 hours  NPO Liquid Status: > 8 hours           Airway   Mallampati: I  TM distance: >3 FB  Neck ROM: full  No difficulty expected  Dental      Pulmonary    (+) a smoker Current,   (-) COPD, sleep apnea  Cardiovascular   Exercise tolerance: poor (<4 METS)    ECG reviewed    (+) hypertension, CAD (coronary calcification on CT), dysrhythmias Atrial Fib, PVD, hyperlipidemia,     ROS comment: Echo:  •  Left ventricular systolic function is mildly decreased. Left ventricular ejection fraction appears to be 46 - 50%.  •  Left ventricular wall thickness is consistent with mild to moderate concentric hypertrophy.  •  The following left ventricular wall segments are hypokinetic: apical anterior, apical lateral, apical inferior, apical septal, apex hypokinetic and mid anteroseptal.  •  Left atrial volume is mildly increased.  •  Estimated right ventricular systolic pressure from tricuspid regurgitation is mildly elevated (35-45 mmHg).  •  Normal size and function the right ventricle.  •  No significant (greater than mild) valvular pathology.  •  Mild dilation of the aortic root is present.  •  Compared to prior exam from 6/19/2022, both studies were technically difficult, but upon my independent review of the previous exam, there did appear to be some mild apical hypokinesis present on that study that does look slightly more prominent on current exam.           Neuro/Psych  (+) seizures,    (-) TIA, CVA    ROS Comment: S/P craniotomy for meningioma, s/p craniectomy with washout with subsequent cranioplasty, now presents with infection of cranial plate  GI/Hepatic/Renal/Endo    (+) morbid obesity, GERD,  renal disease CRI, diabetes mellitus type 2 using insulin,   (-) liver disease    Musculoskeletal     Abdominal    Substance History      OB/GYN          Other                           Anesthesia Plan    ASA 3 - emergent     general     intravenous induction     Anesthetic plan, risks, benefits, and alternatives have been provided, discussed and informed consent has been obtained with: patient.        CODE STATUS:

## 2023-04-12 NOTE — PROGRESS NOTES
Neurosurgery Daily Progress Note    HPI:  Elijah Escobar is a 67 y.o. male with a significant medical history of hypertension, diabetes, hyperlipidemia, seizures, craniotomy for tumor (6/16/2022), craniotomy for washout (7/1/2022), craniectomy (10/4/2022), coronary artery disease, diabetes, erectile dysfunction, GERD, hypertension, hyperlipidemia, tobacco abuse, and obesity.  He presents today with a complaint of a 4-5 days onset of progressively worsening right frontal, temporal, and periorbital edema and associated symptoms to include, but not limited to generalized fatigue, chills, dyspnea, intermittent nausea without vomiting, and states he's felt feverish.  Physical exam findings of neurologically intact with right frontal, temporal, and periorbital swelling.  WBC elevated at 15.  3 to an CRP elevated at 14.75.  Imaging pending.    Assessment:   Past Medical History:   Diagnosis Date   • Brain tumor    • Coronary artery disease    • COVID-19 vaccine series completed     MODERNA X 3; LAST DOSE 3/2022   • Diabetes    • Erectile dysfunction    • GERD (gastroesophageal reflux disease)    • Hypercholesteremia    • Hypertension    • Seizure      Active Hospital Problems    Diagnosis    • **Swelling of scalp    • COPD (chronic obstructive pulmonary disease)    • Obesity (BMI 30-39.9)    • Tobacco abuse, in remission    • Acute systolic heart failure    • ADDIE (obstructive sleep apnea)    • Type 2 myocardial infarction due to arrhythmia    • Acute pulmonary edema due to A-fib RVR    • Acute respiratory failure with hypoxia    • Post-operative infection    • Paroxysmal atrial fibrillation with rapid ventricular response    • Type 2 diabetes mellitus with hyperglycemia and peripheral neuropathy, with long-term current use of insulin (HCC)    • Coronary artery disease    • Meningioma s/p craniotomy      Plan:   Neuro: Stable, intact/at baseline.  Increased right frontal fluid accumulation with associated  "headache  Infected cranial flap      8 Days Post-Op (4/4/2023)  craniectomy and washout   Postop CT stable. No acute abnormalities.   Flap tapped I&D cultures 4+ gram-negative bacilli    Heavy 4+ Serratia marcescens   ID consulted.  Appreciate assistance.  JUAN DC'd  Continue neuro exams per policy.  Call for decline  CT head today    CV: AFib with RVR and heart strain and trop bump.  Stabilized    Appreciate cardiology  Pulm: Chest x-ray cleared  :  Voiding.   FEN: Tolerating carb consistent diet  Endocrine: Glucose improving.  Continue SSI  GI: SHERIF.  +BM  ID: Repeat labs today   Infected cranial flap  CSF: no growth to date   PNA.  Resolved   UTI.  Resolved   Continue meropenem per ID.  Appreciate assist  Heme:  DVT prophylaxis  Pain: Tolerable at present  Dispo: PT/OT    Transfer to floor   DC pending insurance acceptance [facility].  Patient agreeable     Chief complaint:   4-5 days onset of progressively worsening right frontal, temporal, and periorbital edema and associated symptoms to include, but not limited to generalized fatigue, chills, dyspnea, intermittent nausea without vomiting, and states he's felt feverish.    HPI  Subjective  Doing well    Temp:  [97.7 °F (36.5 °C)-98.2 °F (36.8 °C)] 98 °F (36.7 °C)  Heart Rate:  [] 111  Resp:  [14-20] 16  BP: (105-135)/(47-95) 135/95    Output by Drain (mL) 04/11/23 0701 - 04/11/23 1900 04/11/23 1901 - 04/12/23 0700 04/12/23 0701 - 04/12/23 0849 Range Total   Patient has no LDAs of requested type attached.     Objective:  Vital signs: (most recent): Blood pressure 135/95, pulse 111, temperature 98 °F (36.7 °C), temperature source Oral, resp. rate 16, height 177.8 cm (70\"), weight 123 kg (270 lb 4.5 oz), SpO2 95 %.      Neurologic Exam     Mental Status   Oriented to person, place, and time.   Speech: speech is normal   Level of consciousness: alert    Oriented x3.  Follows commands without prompting showing thumbs up and 2 fingers bilaterally. "     Cranial Nerves     CN III, IV, VI   Pupils are equal, round, and reactive to light.  Extraocular motions are normal.     CN V   Facial sensation intact.     CN VII   Facial expression full, symmetric.     Motor Exam   Right arm pronator drift: absent  Left arm pronator drift: absent    Strength   Right deltoid: 5/5  Left deltoid: 5/5  Right biceps: 5/5  Left biceps: 5/5  Right triceps: 5/5  Left triceps: 5/5  Right iliopsoas: 5/5  Left iliopsoas: 5/5  Right quadriceps: 5/5  Left quadriceps: 5/5  Right gastroc: 5/5  Left gastroc: 5/5  Moves all extremities equal and symmetric     Sensory Exam   Light touch normal.     Drains: * No LDAs found *    Imaging Results (Last 24 Hours)     ** No results found for the last 24 hours. **        Lab Results (last 24 hours)     Procedure Component Value Units Date/Time    POC Glucose Once [[id number]]  (Normal) Collected: 04/12/23 0726    Specimen: Blood Updated: 04/12/23 0738     Glucose 115 mg/dL      Comment: : [id number] Lexie MadisonMeter ID: [id number]       POC Glucose Once [[id number]]  (Normal) Collected: 04/12/23 0033    Specimen: Blood Updated: 04/12/23 0044     Glucose 122 mg/dL      Comment: : [id number] Rubi LoriMeter ID: [id number]       POC Glucose Once [[id number]]  (Abnormal) Collected: 04/11/23 2153    Specimen: Blood Updated: 04/11/23 2204     Glucose 135 mg/dL      Comment: : [id number] Rubi LoriMeter ID: [id number]       POC Glucose Once [[id number]]  (Abnormal) Collected: 04/11/23 1602    Specimen: Blood Updated: 04/11/23 1613     Glucose 180 mg/dL      Comment: : [id number] Casimiro AprilMeter ID: [id number]       POC Glucose Once [[id number]]  (Abnormal) Collected: 04/11/23 1113    Specimen: Blood Updated: 04/11/23 1124     Glucose 192 mg/dL      Comment: : [email or web address] Maame AdamMeter ID: [id number]           ADITYA Cespedes

## 2023-04-12 NOTE — PLAN OF CARE
Goal Outcome Evaluation:  Plan of Care Reviewed With: patient           Outcome Evaluation: A&Ox4.  Pt calling out in pain around 2015. PO prn pain med for c/o for 10/10 pain generalized with relief.   Pt diaphorietic just after midnight.  VSS. . Pt realized he was acting bewildered, orientation remains x4 .Ambulated to RR.. Urinated on floor. Pt given bedbath to help cool off and clean up.  Pt walked in the hallway with helmet, walker, and assist x1 about 50 ft and was fatigued.  Slept an hour and attmepted to walk again, w knee pain reported 10/10 and returned to room.  Tylenol given with good results. NC 2-4 L. Abx IV.  Pt sleeping again.  RN sitting at bs as allowed. Bed alarm on.

## 2023-04-13 NOTE — ANESTHESIA POSTPROCEDURE EVALUATION
"Patient: Elijah Escobar    Procedure Summary     Date: 04/12/23 Room / [facility]:  [facility]  [facility]    Anesthesia Start: 1555 Anesthesia Stop: 1713    Procedure: CRANIOTOMY; evacuation of right hematoma (Right: Head) Diagnosis:       Intracranial epidural hematoma      (Intracranial epidural hematoma [S06.4X0A])    Surgeons: Flaco Portillo MD Provider: Awais Purvis CRNA    Anesthesia Type: general ASA Status: 3 - Emergent          Anesthesia Type: general    Vitals  Vitals Value Taken Time   /82 04/12/23 1803   Temp 97.3 °F (36.3 °C) 04/12/23 1800   Pulse 110 04/12/23 1803   Resp 18 04/12/23 1800   SpO2 90 % 04/12/23 1803   Vitals shown include unvalidated device data.        Post Anesthesia Care and Evaluation    Patient location during evaluation: PACU  Patient participation: complete - patient participated  Level of consciousness: awake and alert  Pain management: adequate    Airway patency: patent  Anesthetic complications: No anesthetic complications    Cardiovascular status: acceptable  Respiratory status: acceptable  Hydration status: acceptable    Comments: Blood pressure 138/67, pulse 105, temperature 98.3 °F (36.8 °C), temperature source Oral, resp. rate 18, height 177.8 cm (70\"), weight 123 kg (270 lb 4.5 oz), SpO2 100 %.    Pt discharged from PACU based on rosa maria score >8      "

## 2023-04-13 NOTE — PLAN OF CARE
Goal Outcome Evaluation:  Plan of Care Reviewed With: patient        Progress: no change  Outcome Evaluation: OT re-eval completed. Pt presents alert and oriented x4, resting comfortably in bed. He was educated on need for helmet to be donned prior to OOB activity. He came to sitting at EOB with CGA. Adjusted B socks with set-up and CGA. Donned helmet with Min A for mgt of JUAN line. CGA for sit <> stand t/f from EOB and to take small side steps towards HOB. He declined further mobility at this time d/t fatigue. He would benefit from skilled OT services to address these deficits. Recommend d/c to short term rehab prior to returning home.

## 2023-04-13 NOTE — THERAPY TREATMENT NOTE
Acute Care - Physical Therapy Treatment Note   [place]     Patient Name: Elijah Escobar  : 1955  MRN: [id number]  Today's Date: 2023      Visit Dx:     ICD-10-CM ICD-9-CM   1. Intracranial epidural hematoma  S06.4X0A 852.40   2. Impaired mobility  Z74.09 799.89   3. S/P craniotomy  Z98.890 V45.89   4. Infection of deep incisional surgical site after procedure, initial encounter  T81.42XA 998.59     Patient Active Problem List   Diagnosis   • Meningioma s/p craniotomy   • Hypertension   • GERD (gastroesophageal reflux disease)   • Coronary artery disease   • Class 2 severe obesity due to excess calories with serious comorbidity and body mass index (BMI) of 38.0 to 38.9 in adult   • Type 2 diabetes mellitus with hyperglycemia and peripheral neuropathy, with long-term current use of insulin (HCC)   • Leukocytosis   • Other specified anemias   • Paroxysmal atrial fibrillation with rapid ventricular response   • Post-operative infection   • Smoker   • History of cranioplasty   • Facial edema   • Swelling of scalp   • Acute pulmonary edema due to A-fib RVR   • Acute respiratory failure with hypoxia   • Type 2 myocardial infarction due to arrhythmia   • COPD (chronic obstructive pulmonary disease)   • Obesity (BMI 30-39.9)   • Tobacco abuse, in remission   • Acute systolic heart failure   • ADDIE (obstructive sleep apnea)   • Intracranial epidural hematoma     Past Medical History:   Diagnosis Date   • Brain tumor    • Coronary artery disease    • COVID-19 vaccine series completed     MODERNA X 3; LAST DOSE 3/2022   • Diabetes    • Erectile dysfunction    • GERD (gastroesophageal reflux disease)    • Hypercholesteremia    • Hypertension    • Seizure      Past Surgical History:   Procedure Laterality Date   • BALLOON ANGIOPLASTY, ARTERY Right    • COLONOSCOPY     • CRANIECTOMY Right 2022    Procedure: CRANIECTOMY WITH INCISIONAL WASHOUT;  Surgeon: Felipe Banerjee MD;  Location: [facility];  Service:  Neurosurgery;  Laterality: Right;   • CRANIOPLASTY Right 10/4/2022    Procedure: CRANIOPLASTY right with Lumbar drain;  Surgeon: Flaco Portillo MD;  Location:  [facility];  Service: Neurosurgery;  Laterality: Right;   • CRANIOPLASTY N/A 4/4/2023    Procedure: CRANIOPLASTY, removal, right, horseshoe;  Surgeon: Flaco Portillo MD;  Location:  [facility];  Service: Neurosurgery;  Laterality: N/A;   • CRANIOTOMY Right 4/12/2023    Procedure: CRANIOTOMY; evacuation of right hematoma;  Surgeon: Flaco Portillo MD;  Location:  [facility];  Service: Neurosurgery;  Laterality: Right;   • CRANIOTOMY FOR TUMOR Right 6/16/2022    Procedure: CRANIOTOMY FOR TUMOR STERIOTACTIC WITH BRAIN LAB, right;  Surgeon: Flaco Portillo MD;  Location:  [facility];  Service: Neurosurgery;  Laterality: Right;     PT Assessment (last 12 hours)     PT Evaluation and Treatment     Row Name 04/13/23 0951          Physical Therapy Time and Intention    Subjective Information complains of;pain  -     Document Type therapy note (daily note)  -     Mode of Treatment physical therapy  -     Row Name 04/13/23 0951          General Information    Patient/Family/Caregiver Comments/Observations 2 sisters present  -     Existing Precautions/Restrictions fall  helmet on when up,JUAN drain  -     Row Name 04/13/23 0951          Pain    Pretreatment Pain Rating 10/10  -     Posttreatment Pain Rating 10/10  -     Pain Location - Side/Orientation Bilateral  -     Pain Location lower  -     Pain Location - extremity  -     Pain Intervention(s) Repositioned;Medication (See MAR)  -     Row Name 04/13/23 0951          Bed Mobility    Supine-Sit [place] (Bed Mobility) contact guard;verbal cues  -     Sit-Supine [place] (Bed Mobility) --  chair  -     Row Name 04/13/23 0951          Sit-Stand Transfer    Sit-Stand [place] (Transfers) verbal cues;contact guard  -     Assistive Device (Sit-Stand Transfers)  walker, front-wheeled  -AH     Row Name 04/13/23 0951          Stand-Sit Transfer    Stand-Sit [place] (Transfers) verbal cues;contact guard  -     Assistive Device (Stand-Sit Transfers) walker, front-wheeled  -     Comment, (Stand-Sit Transfer) cues to sit softly  -     Row Name 04/13/23 0951          Gait/Stairs (Locomotion)    [place] Level (Gait) verbal cues;contact guard  -     Assistive Device (Gait) walker, front-wheeled  -     Distance in Feet (Gait) 50  50 x 3  -     Pattern (Gait) step-through  -     Deviations/Abnormal Patterns (Gait) stride length decreased  -     Row Name 04/13/23 0951          Motor Skills    Comments, Therapeutic Exercise standing at rail, BLE heel raises, hip abd/add, marching in place  -     Additional Documentation Comments, Therapeutic Exercise (Row)  -     Row Name             Wound 04/04/23 1111 Right temporal region Incision    Wound - Properties Group Placement Date: 04/04/23  -CHRISTIANO Placement Time: 1111  -CHRISTIANO Side: Right  -CHRISTIANO Location: temporal region  -CHRISTIANO Primary Wound Type: Incision  -CHRISTIANO    Retired Wound - Properties Group Placement Date: 04/04/23  -CHRISTIANO Placement Time: 1111  -CHRISTIANO Side: Right  -CHRISTIANO Location: temporal region  -CHRISTIANO Primary Wound Type: Incision  -CHRISTIANO    Retired Wound - Properties Group Date first assessed: 04/04/23  -CHRISTIANO Time first assessed: 1111  -CHRISTIANO Side: Right  -CHRISTIANO Location: temporal region  -CHRISTIANO Primary Wound Type: Incision  -CHRISTIANO    Row Name             Wound 04/12/23 1622 Right scalp Incision    Wound - Properties Group Placement Date: 04/12/23  -SAFIA Placement Time: 1622  -SAFIA Side: Right  -SAFIA Location: scalp  -SAFIA Primary Wound Type: Incision  -SAFIA    Retired Wound - Properties Group Placement Date: 04/12/23  -SAFIA Placement Time: 1622  -SAFIA Side: Right  -SAFIA Location: scalp  -SAFIA Primary Wound Type: Incision  -SAFIA    Retired Wound - Properties Group Date first assessed: 04/12/23  -SAFIA Time first assessed: 1622  -SAFIA Side: Right  -SAFIA Location: scalp   -SAFIA Primary Wound Type: Incision  -SAFIA    Row Name 04/13/23 0951          Positioning and Restraints    Pre-Treatment Position in bed  -AH     Post Treatment Position chair  -     In Chair reclined;call light within reach;encouraged to call for assist;exit alarm on;with family/caregiver;with nsg  -           User Key  (r) = Recorded By, (t) = Taken By, (c) = Cosigned By    Initials Name Provider Type     Mary Alice Benton, PTA Physical Therapist Assistant    Kierra Conroy, RN Registered Nurse    Ricky George RN Registered Nurse                Physical Therapy Education     Title: PT OT SLP Therapies (In Progress)     Topic: Physical Therapy (In Progress)     Point: Mobility training (In Progress)     Learning Progress Summary           Patient Acceptance, E, NR by JUAN at 4/11/2023 0809    Comment: Looking ahead during gait    Acceptance, E,D, DU,VU by JUAN at 4/10/2023 1447    Comment: Safety with transfers, please take time, no impulsiveness    Acceptance, E,TB, VU by CM at 4/6/2023 1350    Acceptance, E, VU by SB at 4/5/2023 1605    Comment: helmet fit, safety, POC    Acceptance, E, VU by CHRISTIANO at 4/3/2023 1353    Comment: gait, safety,    Acceptance, E, VU by SB at 4/1/2023 1405    Comment: pt edu on POC, benefits of act, PLB and d/c plans   Family Acceptance, E,TB, VU by CM at 4/6/2023 1350                   Point: Home exercise program (Done)     Learning Progress Summary           Patient Acceptance, E,TB, VU by CM at 4/6/2023 1350   Family Acceptance, E,TB, VU by CM at 4/6/2023 1350                   Point: Body mechanics (Done)     Learning Progress Summary           Patient Acceptance, E,TB, VU by CM at 4/6/2023 1350   Family Acceptance, E,TB, VU by CM at 4/6/2023 1350                   Point: Precautions (Done)     Learning Progress Summary           Patient Acceptance, E,TB, VU by CM at 4/6/2023 1350    Acceptance, E, VU by SB at 4/5/2023 1605    Comment: helmet fit, safety, POC    Acceptance,  E, VU by SB at 4/1/2023 1405    Comment: pt edu on POC, benefits of act, PLB and d/c plans   Family Acceptance, E,TB, VU by CM at 4/6/2023 1350                               User Key     Initials Effective Dates Name Provider Type [facility] 02/03/23 -  Carrie Callaway, PTA Physical Therapist Assistant PT    CHRISTIANO 02/03/23 -  Александр Saravia, PTA Physical Therapist Assistant PT    CM 03/02/23 -  Dianelys Carlton, RN Registered Nurse Nurse    [facility] 06/16/21 -  Erika Javier, PT DPT Physical Therapist PT              PT Recommendation and Plan         Outcome Measures     Row Name 04/13/23 1300 04/11/23 0800 04/10/23 1400       How much help from another person do you currently need...    Turning from your back to your side while in flat bed without using bedrails? 4  - 4  -JUAN 4  -JUAN    Moving from lying on back to sitting on the side of a flat bed without bedrails? 4  - 4  -JUAN 4  -JUAN    Moving to and from a bed to a chair (including a wheelchair)? 3  - 3  -JUAN 3  -JUAN    Standing up from a chair using your arms (e.g., wheelchair, bedside chair)? 3  - 4  -JUAN 4  -JUAN    Climbing 3-5 steps with a railing? 3  - 3  -JUAN 3  -JUAN    To walk in hospital room? 3  - 3  -JUAN 3  -JUAN    AM-PAC 6 Clicks Score (PT) 20  - 21  -JUAN 21  -JUAN       Functional Assessment    Outcome Measure Options AM-PAC 6 Clicks Basic Mobility (PT)  - -- --          User Key  (r) = Recorded By, (t) = Taken By, (c) = Cosigned By    Initials Name Provider Type     Mary Alice Benton, [place]LAH Physical Therapist Assistant     Carrie Callaway PTA Physical Therapist Assistant                 Time Calculation:    PT Charges     Row Name 04/13/23 1303             Time Calculation    Start Time 0951  -      Stop Time 1029  -      Time Calculation (min) 38 min  -      PT Received On 04/13/23  -         Time Calculation- PT    Total Timed Code Minutes- PT 38 minute(s)  -         Timed Charges    26823 - PT Therapeutic Exercise Minutes 15   -      23310 - Gait Training Minutes  23  -AH         Total Minutes    Timed Charges Total Minutes 38  -AH       Total Minutes 38  -AH            User Key  (r) = Recorded By, (t) = Taken By, (c) = Cosigned By    Initials Name Provider Type    Mary Alice Gunderson PTA Physical Therapist Assistant              Therapy Charges for Today     Code Description Service Date Service Provider Modifiers Qty    42594235482 HC PT THER PROC EA 15 MIN 4/13/2023 Mary Alice Benton PTA GP 1    04458225643 HC GAIT TRAINING EA 15 MIN 4/13/2023 Mary Alice Benton PTA GP 2          PT G-Codes  Outcome Measure Options: AM-PAC 6 Clicks Basic Mobility (PT)  AM-PAC 6 Clicks Score (PT): 20  AM-PAC 6 Clicks Score (OT): 22    Mary Alice Benton PTA  4/13/2023

## 2023-04-13 NOTE — PLAN OF CARE
Goal Outcome Evaluation:  Plan of Care Reviewed With: patient      Progress: no change  Outcome Evaluation: Ntn follow up. Oral intake 25% of two meals, 100% of one snack. CCHO diet. Boost Glucose Control BID. Will send pt Boost Breeze with dinner tonight. Encourage oral intake with meals. Cont to follow for plan of care.

## 2023-04-13 NOTE — PLAN OF CARE
Goal Outcome Evaluation:               VSS. No neuro changes this shift. JUAN in place. Pt c/o headache and generalized body aches. Prn given. Pt A&Ox4 with intermittent confusion. SCDs on most of shift. Pt is compulsive and does not use call light. Safety maintained.

## 2023-04-13 NOTE — PLAN OF CARE
Goal Outcome Evaluation:           Progress: improving  Outcome Evaluation: Pt A&Ox4. Up x1 to BR. Inc to head CDI. JUAN drain with no output this shift. IVF and abx cont per orders. C/o of pain w/ PRN pain meds given. PPP. Denies n/t. Bed alarm on. Call light in reach. Safety maintained. [facility]

## 2023-04-13 NOTE — PROGRESS NOTES
Neurosurgery Daily Progress Note    HPI:  Elijah Escobar is a 67 y.o. male with a significant medical history of hypertension, diabetes, hyperlipidemia, seizures, craniotomy for tumor (6/16/2022), craniotomy for washout (7/1/2022), craniectomy (10/4/2022), coronary artery disease, diabetes, erectile dysfunction, GERD, hypertension, hyperlipidemia, tobacco abuse, and obesity.  He presents today with a complaint of a 4-5 days onset of progressively worsening right frontal, temporal, and periorbital edema and associated symptoms to include, but not limited to generalized fatigue, chills, dyspnea, intermittent nausea without vomiting, and states he's felt feverish.  Physical exam findings of neurologically intact with right frontal, temporal, and periorbital swelling.  WBC elevated at 15.  3 to an CRP elevated at 14.75.  Imaging pending.    Assessment:   Past Medical History:   Diagnosis Date   • Brain tumor    • Coronary artery disease    • COVID-19 vaccine series completed     MODERNA X 3; LAST DOSE 3/2022   • Diabetes    • Erectile dysfunction    • GERD (gastroesophageal reflux disease)    • Hypercholesteremia    • Hypertension    • Seizure      Active Hospital Problems    Diagnosis    • **Swelling of scalp    • COPD (chronic obstructive pulmonary disease)    • Obesity (BMI 30-39.9)    • Tobacco abuse, in remission    • Acute systolic heart failure    • ADDIE (obstructive sleep apnea)    • Type 2 myocardial infarction due to arrhythmia    • Acute pulmonary edema due to A-fib RVR    • Acute respiratory failure with hypoxia    • Intracranial epidural hematoma    • Post-operative infection    • Paroxysmal atrial fibrillation with rapid ventricular response    • Type 2 diabetes mellitus with hyperglycemia and peripheral neuropathy, with long-term current use of insulin (HCC)    • Coronary artery disease    • Meningioma s/p craniotomy      Plan:   Neuro: Stable, intact/at baseline.  Increased right frontal fluid  "accumulation with associated headache  Infected cranial flap      POD #8 (4/4/2023)  craniectomy and washout    Postop CT stable. No acute abnormalities.    Flap tapped I&D cultures 4+ gram-negative bacilli    Heavy 4+ Serratia marcescens     1 Day Post-Op (4/13/2023) evacuation of scalp hematoma    JUAN = 18 mL, keep    Continue neuro exams per policy.  Call for decline    CV: AFib with RVR and heart strain and trop bump.  Stabilized    Appreciate cardiology  Pulm: Chest x-ray cleared  :  Voiding.   FEN: Tolerating carb consistent diet  Endocrine: Glucose improving.  Continue SSI  GI: SHERIF.  +BM  ID: Repeat labs today   Infected cranial flap  CSF: no growth to date   PNA.  Resolved   UTI.  Resolved   Continue meropenem per ID.  Appreciate assist  Heme:  DVT prophylaxis with SCDs.  Hold all anticoagulation for 2 weeks.  Pain: Tolerable at present  Dispo: PT/OT    [facility]  Patient agreeable   DC pending drain placement.     Chief complaint:   4-5 days onset of progressively worsening right frontal, temporal, and periorbital edema and associated symptoms to include, but not limited to generalized fatigue, chills, dyspnea, intermittent nausea without vomiting, and states he's felt feverish.    HPI  Subjective  Doing well    Temp:  [96.9 °F (36.1 °C)-98.3 °F (36.8 °C)] 97.5 °F (36.4 °C)  Heart Rate:  [] 113  Resp:  [16-22] 20  BP: (114-151)/(54-98) 127/69    Output by Drain (mL) 04/12/23 0701 - 04/12/23 1900 04/12/23 1901 - 04/13/23 0700 04/13/23 0701 - 04/13/23 0907 Range Total   Closed/Suction Drain 1 Right Scalp Bulb 3 15  18     Objective:  Vital signs: (most recent): Blood pressure 127/69, pulse 113, temperature 97.5 °F (36.4 °C), temperature source Oral, resp. rate 20, height 177.8 cm (70\"), weight 123 kg (270 lb 4.5 oz), SpO2 99 %.      Neurologic Exam     Mental Status   Oriented to person, place, and time.   Speech: speech is normal   Level of consciousness: alert    Oriented x3.  " Follows commands without prompting showing thumbs up and 2 fingers bilaterally.     Cranial Nerves     CN III, IV, VI   Pupils are equal, round, and reactive to light.  Extraocular motions are normal.     CN V   Facial sensation intact.     CN VII   Facial expression full, symmetric.     Motor Exam   Right arm pronator drift: absent  Left arm pronator drift: absent    Strength   Right deltoid: 5/5  Left deltoid: 5/5  Right biceps: 5/5  Left biceps: 5/5  Right triceps: 5/5  Left triceps: 5/5  Right iliopsoas: 5/5  Left iliopsoas: 5/5  Right quadriceps: 5/5  Left quadriceps: 5/5  Right gastroc: 5/5  Left gastroc: 5/5  Moves all extremities equal and symmetric     Sensory Exam   Light touch normal.     Drains: * No LDAs found *    Imaging Results (Last 24 Hours)     Procedure Component Value Units Date/Time    CT Head Without Contrast [[id number]] Collected: 04/12/23 0929     Updated: 04/12/23 0933    Narrative:      EXAM: CT HEAD WO CONTRAST- - 4/12/2023 9:16 AM CDT     HISTORY: Increased right frontal fluid accumulation; Z98.890-Other  specified postprocedural states; Z74.09-Other reduced mobility;  T81.42XA-Infection following a procedure, deep incisional surgical site,  initial encounter       COMPARISON: 4/4/2023.      DOSE LENGTH PRODUCT: 797 mGy cm. Automated exposure control was also  utilized to decrease patient radiation dose.     TECHNIQUE: Unenhanced CT images obtained from vertex to skull base with  multiplanar reformats.     FINDINGS:  RIGHT frontal craniectomy. There is a 10.0 x 3.1 cm heterogeneous  superficial mass suggesting scalp hematoma overlying the craniectomy  with mass effect at the RIGHT frontal lobe. Similar cortical and  subcortical hypodensity at the RIGHT frontal lobe compared to 4/4/2023.  However, there is mildly increased RIGHT frontal lobe sulcal effacement  and mild mass effect on the anterior horn RIGHT lateral ventricle. New 4  mm RIGHT to LEFT midline shift.     Except as described  above, ventricles have similar size compared to  4/4/2023. Basilar cisterns patent. Gray-white matter differentiation  maintained in the remaining cerebral parenchyma. Punctate RIGHT basilar  calcification.     Globes, retrobulbar soft tissues, paranasal sinuses, mastoid air cells  and external auditory canals are within normal limits.           Impression:      1. RIGHT frontal craniectomy with new 10.0 x 3.1 cm heterogeneous  superficial mass, appearance most suggestive of hematoma. Mass effect on  the RIGHT frontal lobe with new RIGHT to LEFT 4 mm midline shift, mild  mass effect on the anterior horn RIGHT lateral ventricle and mild RIGHT  frontal lobe sulcal effacement.  2. Similar RIGHT frontal cortical and subcortical hypodensity.     Result communicated to ADITYA Gallego at 9:28 AM on 4/12/2023.  This report was finalized on 04/12/2023 09:30 by Dr Avani Alvarenga MD.        Lab Results (last 24 hours)     Procedure Component Value Units Date/Time    POC Glucose Once [[id number]]  (Normal) Collected: 04/13/23 0807    Specimen: Blood Updated: 04/13/23 0822     Glucose 75 mg/dL      Comment: : [id number] Rm KimMeter ID: [id number]       POC Glucose Once [[id number]]  (Normal) Collected: 04/12/23 2200    Specimen: Blood Updated: 04/12/23 2211     Glucose 88 mg/dL      Comment: : [id number] Jeison LisaMeter ID: [id number]       POC Glucose Once [[id number]]  (Normal) Collected: 04/12/23 1722    Specimen: Blood Updated: 04/12/23 1734     Glucose 92 mg/dL      Comment: : [id number] Kathy WoodMeter ID: [id number]       POC Glucose Once [[id number]]  (Normal) Collected: 04/12/23 1123    Specimen: Blood Updated: 04/12/23 1144     Glucose 101 mg/dL      Comment: : [id number] Lexie MadisonMeter ID: [id number]       Comprehensive Metabolic Panel [[id number]]  (Abnormal) Collected: 04/12/23 0939    Specimen: Blood Updated: 04/12/23 1023     Glucose 135 mg/dL      BUN 16 mg/dL      Creatinine 0.94  mg/dL      Sodium 142 mmol/L      Potassium 4.1 mmol/L      Chloride 105 mmol/L      CO2 27.0 mmol/L      Calcium 8.0 mg/dL      Total Protein 6.1 g/dL      Albumin 3.1 g/dL      ALT (SGPT) 29 U/L      AST (SGOT) 25 U/L      Alkaline Phosphatase 87 U/L      Total Bilirubin 0.4 mg/dL      Globulin 3.0 gm/dL      A/G Ratio 1.0 g/dL      BUN/Creatinine Ratio 17.0     Anion Gap 10.0 mmol/L      eGFR 88.9 mL/min/1.73     Narrative:      GFR Normal >60  Chronic Kidney Disease <60  Kidney Failure <15      aPTT [[id number]]  (Abnormal) Collected: 04/12/23 0948    Specimen: Blood Updated: 04/12/23 1013     PTT 53.9 seconds     Protime-INR [[id number]]  (Abnormal) Collected: 04/12/23 0948    Specimen: Blood Updated: 04/12/23 1013     Protime 15.6 Seconds      INR 1.22    CBC & Differential [[id number]]  (Abnormal) Collected: 04/12/23 0939    Specimen: Blood Updated: 04/12/23 0959    Narrative:      The following orders were created for panel order CBC & Differential.  Procedure                               Abnormality         Status                     ---------                               -----------         ------                     CBC Auto Differential[[id number]]        Abnormal            Final result                 Please view results for these tests on the individual orders.    CBC Auto Differential [[id number]]  (Abnormal) Collected: 04/12/23 0939    Specimen: Blood Updated: 04/12/23 0959     WBC 10.00 10*3/mm3      RBC 3.70 10*6/mm3      Hemoglobin 11.1 g/dL      Hematocrit 36.1 %      MCV 97.6 fL      MCH 30.0 pg      MCHC 30.7 g/dL      RDW 13.4 %      RDW-SD 47.8 fl      MPV 11.0 fL      Platelets 209 10*3/mm3      Neutrophil % 75.9 %      Lymphocyte % 15.9 %      Monocyte % 6.6 %      Eosinophil % 0.7 %      Basophil % 0.1 %      Immature Grans % 0.8 %      Neutrophils, Absolute 7.59 10*3/mm3      Lymphocytes, Absolute 1.59 10*3/mm3      Monocytes, Absolute 0.66 10*3/mm3      Eosinophils, Absolute 0.07 10*3/mm3       Basophils, Absolute 0.01 10*3/mm3      Immature Grans, Absolute 0.08 10*3/mm3      nRBC 0.0 /100 WBC         Rahul Arnold, APRN

## 2023-04-13 NOTE — THERAPY TREATMENT NOTE
Acute Care - Physical Therapy Treatment Note   [place]     Patient Name: Elijah Escobar  : 1955  MRN: [id number]  Today's Date: 2023      Visit Dx:     ICD-10-CM ICD-9-CM   1. Intracranial epidural hematoma  S06.4X0A 852.40   2. Impaired mobility  Z74.09 799.89   3. S/P craniotomy  Z98.890 V45.89   4. Infection of deep incisional surgical site after procedure, initial encounter  T81.42XA 998.59     Patient Active Problem List   Diagnosis   • Meningioma s/p craniotomy   • Hypertension   • GERD (gastroesophageal reflux disease)   • Coronary artery disease   • Class 2 severe obesity due to excess calories with serious comorbidity and body mass index (BMI) of 38.0 to 38.9 in adult   • Type 2 diabetes mellitus with hyperglycemia and peripheral neuropathy, with long-term current use of insulin (HCC)   • Leukocytosis   • Other specified anemias   • Paroxysmal atrial fibrillation with rapid ventricular response   • Post-operative infection   • Smoker   • History of cranioplasty   • Facial edema   • Swelling of scalp   • Acute pulmonary edema due to A-fib RVR   • Acute respiratory failure with hypoxia   • Type 2 myocardial infarction due to arrhythmia   • COPD (chronic obstructive pulmonary disease)   • Obesity (BMI 30-39.9)   • Tobacco abuse, in remission   • Acute systolic heart failure   • ADDIE (obstructive sleep apnea)   • Intracranial epidural hematoma     Past Medical History:   Diagnosis Date   • Brain tumor    • Coronary artery disease    • COVID-19 vaccine series completed     MODERNA X 3; LAST DOSE 3/2022   • Diabetes    • Erectile dysfunction    • GERD (gastroesophageal reflux disease)    • Hypercholesteremia    • Hypertension    • Seizure      Past Surgical History:   Procedure Laterality Date   • BALLOON ANGIOPLASTY, ARTERY Right    • COLONOSCOPY     • CRANIECTOMY Right 2022    Procedure: CRANIECTOMY WITH INCISIONAL WASHOUT;  Surgeon: Felipe Banerjee MD;  Location: [facility];  Service:  Neurosurgery;  Laterality: Right;   • CRANIOPLASTY Right 10/4/2022    Procedure: CRANIOPLASTY right with Lumbar drain;  Surgeon: Flaco Portillo MD;  Location:  [facility];  Service: Neurosurgery;  Laterality: Right;   • CRANIOPLASTY N/A 4/4/2023    Procedure: CRANIOPLASTY, removal, right, horseshoe;  Surgeon: Flaco Portillo MD;  Location:  [facility];  Service: Neurosurgery;  Laterality: N/A;   • CRANIOTOMY Right 4/12/2023    Procedure: CRANIOTOMY; evacuation of right hematoma;  Surgeon: Flaco Portillo MD;  Location:  [facility];  Service: Neurosurgery;  Laterality: Right;   • CRANIOTOMY FOR TUMOR Right 6/16/2022    Procedure: CRANIOTOMY FOR TUMOR STERIOTACTIC WITH BRAIN LAB, right;  Surgeon: Flaco Portillo MD;  Location:  [facility];  Service: Neurosurgery;  Laterality: Right;     PT Assessment (last 12 hours)     PT Evaluation and Treatment     Row Name 04/13/23 1304 04/13/23 0951       Physical Therapy Time and Intention    Subjective Information complains of;pain  - complains of;pain  -    Document Type therapy note (daily note)  - therapy note (daily note)  -    Mode of Treatment physical therapy  - physical therapy  -    Row Name 04/13/23 1304 04/13/23 0951       General Information    Patient/Family/Caregiver Comments/Observations -- 2 sisters present  -    Existing Precautions/Restrictions fall  helmet on when up,JUAN drain  - fall  helmet on when up,JUAN drain  -    Row Name 04/13/23 1304 04/13/23 0951       Pain    Pretreatment Pain Rating 9/10  - 10/10  -    Posttreatment Pain Rating 9/10  - 10/10  -    Pain Location - Side/Orientation -- Bilateral  -    Pain Location -- lower  -    Pain Location - head  - extremity  -    Pre/Posttreatment Pain Comment [facility]  - --    Pain Intervention(s) Medication (See MAR);Ambulation/increased activity  - Repositioned;Medication (See MAR)  -    Row Name 04/13/23 1304 04/13/23 0951       Bed Mobility     Supine-Sit [place] (Bed Mobility) --  CHAIR  - contact guard;verbal cues  -    Sit-Supine [place] (Bed Mobility) contact guard;verbal cues  - --  chair  -    Row Name 04/13/23 1304 04/13/23 0951       Sit-Stand Transfer    Sit-Stand [place] (Transfers) verbal cues;contact guard  - verbal cues;contact guard  -    Assistive Device (Sit-Stand Transfers) walker, front-wheeled  - walker, front-wheeled  -    Row Name 04/13/23 1304 04/13/23 0951       Stand-Sit Transfer    Stand-Sit [place] (Transfers) verbal cues;contact guard  - verbal cues;contact guard  -    Assistive Device (Stand-Sit Transfers) walker, front-wheeled  - walker, front-wheeled  -    Comment, (Stand-Sit Transfer) cues to sit softly  - cues to sit softly  -    Row Name 04/13/23 1304 04/13/23 0951       Gait/Stairs (Locomotion)    [place] Level (Gait) verbal cues;contact guard  - verbal cues;contact guard  -    Assistive Device (Gait) walker, front-wheeled  - walker, front-wheeled  -AH    Distance in Feet (Gait) 50  - 50  50 x 3  -    Pattern (Gait) step-through  - step-through  -    Deviations/Abnormal Patterns (Gait) stride length decreased  - stride length decreased  -    Row Name 04/13/23 1304 04/13/23 0951       Motor Skills    Comments, Therapeutic Exercise -- standing at rail, BLE heel raises, hip abd/add, marching in place  -    Additional Documentation Advanced Stepping/Walking Interventions (Group)  - Comments, Therapeutic Exercise (Row)  -    Row Name 04/13/23 1304          Advanced Stepping/Walking Interventions    Stepping/Walking Interventions backward walking;side stepping  -     Backward Walking (Stepping/Walking Interventions) 20 x 2, HHA min  -     Side Stepping (Stepping/Walking Interventions) 20 x 2 HHA min  -     Row Name             Wound 04/04/23 1111 Right temporal region Incision    Wound - Properties Group Placement Date: 04/04/23  -CHRISTIANO Placement  Time: 1111  -CHRISTIANO Side: Right  -CHRISTIANO Location: temporal region  -CHRISTIANO Primary Wound Type: Incision  -CHRISTIANO    Retired Wound - Properties Group Placement Date: 04/04/23  -CHRISTIANO Placement Time: 1111  -CHRISTIANO Side: Right  -CHRISTIANO Location: temporal region  -CHRISTIANO Primary Wound Type: Incision  -CHRISTIANO    Retired Wound - Properties Group Date first assessed: 04/04/23  -CHRISTIANO Time first assessed: 1111  -CHRISTIANO Side: Right  -CHRISTIANO Location: temporal region  -CHRISTIANO Primary Wound Type: Incision  -CHRISTIANO    Row Name             Wound 04/12/23 1622 Right scalp Incision    Wound - Properties Group Placement Date: 04/12/23  -SAFIA Placement Time: 1622 -SAFIA Side: Right  -SAFIA Location: scalp  -SAFIA Primary Wound Type: Incision  -SAFIA    Retired Wound - Properties Group Placement Date: 04/12/23  -SAFIA Placement Time: 1622 -SAFIA Side: Right  -SAFIA Location: scalp  -SAFIA Primary Wound Type: Incision  -SAFIA    Retired Wound - Properties Group Date first assessed: 04/12/23  -SAFIA Time first assessed: 1622  -SAFIA Side: Right  -SAFIA Location: scalp  -SAFIA Primary Wound Type: Incision  -SAFIA    Row Name 04/13/23 1304          Plan of Care Review    Plan of Care Reviewed With patient  -     Progress improving  -     Outcome Evaluation pt in chair, cues for safety, leatha helmet, sit-stand cga-min, pt a;mb 50 feet rwx cga, 02 @ 4L, worked on balance activities, amb backwards HHA min assist, side stepping HHA MIN, trans back to bed cga-min, cues to sit softly, pt would benefit from cont therapy  -     Row Name 04/13/23 1304          Vital Signs    O2 Delivery Intra Treatment nasal cannula  4L  -     Post SpO2 (%) 97  -     O2 Delivery Post Treatment nasal cannula  4L  -AH     Row Name 04/13/23 1304 04/13/23 0951       Positioning and Restraints    Pre-Treatment Position sitting in chair/recliner  - in bed  -    Post Treatment Position bed  - chair  -    In Bed fowlers;call light within reach;encouraged to call for assist;exit alarm on;notified [facility]  - --    In Chair -- reclined;call light  within reach;encouraged to call for assist;exit alarm on;with family/caregiver;with nsg  -AH          User Key  (r) = Recorded By, (t) = Taken By, (c) = Cosigned By    Initials Name Provider Type    Mary Alice Gunderson, PTA Physical Therapist Assistant    Kierra Conroy, RN Registered Nurse    Ricky George RN Registered Nurse                Physical Therapy Education     Title: PT OT SLP Therapies (In Progress)     Topic: Physical Therapy (In Progress)     Point: Mobility training (In Progress)     Learning Progress Summary           Patient Acceptance, E, NR by JUAN at 4/11/2023 0809    Comment: Looking ahead during gait    Acceptance, E,D, DU,VU by JUAN at 4/10/2023 1447    Comment: Safety with transfers, please take time, no impulsiveness    Acceptance, E,TB, VU by CM at 4/6/2023 1350    Acceptance, E, VU by SB at 4/5/2023 1605    Comment: helmet fit, safety, POC    Acceptance, E, VU by CHRISTIANO at 4/3/2023 1353    Comment: gait, safety,    Acceptance, E, VU by SB at 4/1/2023 1405    Comment: pt edu on POC, benefits of act, PLB and d/c plans   Family Acceptance, E,TB, VU by CM at 4/6/2023 1350                   Point: Home exercise program (Done)     Learning Progress Summary           Patient Acceptance, E,TB, VU by CM at 4/6/2023 1350   Family Acceptance, E,TB, VU by CM at 4/6/2023 1350                   Point: Body mechanics (Done)     Learning Progress Summary           Patient Acceptance, E,TB, VU by CM at 4/6/2023 1350   Family Acceptance, E,TB, VU by CM at 4/6/2023 1350                   Point: Precautions (Done)     Learning Progress Summary           Patient Acceptance, E,TB, VU by CM at 4/6/2023 1350    Acceptance, E, VU by SB at 4/5/2023 1605    Comment: helmet fit, safety, POC    Acceptance, E, VU by SB at 4/1/2023 1405    Comment: pt edu on POC, benefits of act, PLB and d/c plans   Family Acceptance, E,TB, VU by CM at 4/6/2023 1350                               User Key     Initials Effective Dates  Name Provider Type [facility] 02/03/23 -  Carrie Callaway, PTA Physical Therapist Assistant PT    CHRISTIANO 02/03/23 -  Александр Saravia, KYLAH Physical Therapist Assistant PT    CM 03/02/23 -  Dianelys Carlton, RN Registered Nurse Nurse    SB 06/16/21 -  Erika Javier, PT DPT Physical Therapist PT              PT Recommendation and Plan     Plan of Care Reviewed With: patient  Progress: improving  Outcome Evaluation: pt in chair, cues for safety, leatha helmet, sit-stand cga-min, pt a;mb 50 feet rwx cga, 02 @ 4L, worked on balance activities, amb backwards HHA min assist, side stepping HHA MIN, trans back to bed cga-min, cues to sit softly, pt would benefit from cont therapy   Outcome Measures     Row Name 04/13/23 1300 04/11/23 0800 04/10/23 1400       How much help from another person do you currently need...    Turning from your back to your side while in flat bed without using bedrails? 4  - 4  -JUAN 4  -JUAN    Moving from lying on back to sitting on the side of a flat bed without bedrails? 4  - 4  -JUAN 4  -JUAN    Moving to and from a bed to a chair (including a wheelchair)? 3  - 3  -JUAN 3  -JUAN    Standing up from a chair using your arms (e.g., wheelchair, bedside chair)? 3  - 4  -JUAN 4  -JUAN    Climbing 3-5 steps with a railing? 3  - 3  -JUAN 3  -JUAN    To walk in hospital room? 3  - 3  -JUAN 3  -JUAN    AM-PAC 6 Clicks Score (PT) 20  - 21  -JUAN 21  -JUAN       Functional Assessment    Outcome Measure Options AM-PAC 6 Clicks Basic Mobility (PT)  - -- --          User Key  (r) = Recorded By, (t) = Taken By, (c) = Cosigned By    Initials Name Provider Type     Mary Alice Benton, KYLAH Physical Therapist Assistant    JUAN Carrie Callaway, PTA Physical Therapist Assistant                 Time Calculation:    PT Charges     Row Name 04/13/23 1340 04/13/23 1303          Time Calculation    Start Time 1304  - 0951  -     Stop Time 1330  - 1029  -     Time Calculation (min) 26 min  - 38 min  -     PT Received  On 04/13/23  - 04/13/23  -        Time Calculation- PT    Total Timed Code Minutes- PT 26 minute(s)  - 38 minute(s)  -        Timed Charges    81705 - PT Therapeutic Exercise Minutes -- 15  -     41728 - Gait Training Minutes  15  -AH 23  -     24576 - PT Therapeutic Activity Minutes 11  -AH --        Total Minutes    Timed Charges Total Minutes 26  -AH 38  -AH      Total Minutes 26  -AH 38  -AH           User Key  (r) = Recorded By, (t) = Taken By, (c) = Cosigned By    Initials Name Provider Type     Mary Alice Benton PTA Physical Therapist Assistant              Therapy Charges for Today     Code Description Service Date Service Provider Modifiers Qty    87069487614 HC PT THER PROC EA 15 MIN 4/13/2023 Mary Alice Benton, PTA GP 1    01685522408 HC GAIT TRAINING EA 15 MIN 4/13/2023 Mary Alice Benton, KYLAH GP 2    43233762392 HC GAIT TRAINING EA 15 MIN 4/13/2023 Mary Alice Benton, PTA GP 1    51609093757 HC PT THERAPEUTIC ACT EA 15 MIN 4/13/2023 Mary Alice Benton, PTA GP 1          PT G-Codes  Outcome Measure Options: AM-PAC 6 Clicks Basic Mobility (PT)  AM-PAC 6 Clicks Score (PT): 20  AM-PAC 6 Clicks Score (OT): 22    Mary Alice Benton PTA  4/13/2023

## 2023-04-13 NOTE — PLAN OF CARE
Goal Outcome Evaluation:  Plan of Care Reviewed With: patient        Progress: improving  Outcome Evaluation: pt in chair, cues for safety, leatha helmet, sit-stand cga-min, pt anirudh;mb 50 feet rwx cga, 02 @ 4L, worked on balance activities, amb backwards HHA min assist, side stepping HHA MIN, trans back to bed cga-min, cues to sit softly, pt would benefit from cont therapy

## 2023-04-13 NOTE — PROGRESS NOTES
[facility] Medicine Services  INPATIENT PROGRESS NOTE    Patient Name: Elijah Escobar  Date of Admission: 3/31/2023  Today's Date: 04/13/23  Length of Stay: 13  Primary Care Physician: Brianna Martinez APRN    Subjective   Chief Complaint: No complaint  HPI     The patient is doing well postoperatively.  The patient underwent successful evacuation of hematoma yesterday.  Drain remains in place.  White blood cell count was within normal limits yesterday with hemoglobin stable 11.1.  The patient's heart rate remains controlled today at 97.  Blood pressure is stable.  He is in no distress and is actually quite jovial and bright appearing.  Engaged in conversation easily.  He has periods of intermittent confusion still.  SNF bed has been offered for continuation of IV antibiotics with conversion to oral antibiotics and to continue rehabilitation.    Review of Systems   All pertinent negatives and positives are as above. All other systems have been reviewed and are negative unless otherwise stated.     Objective    Temp:  [96.9 °F (36.1 °C)-98.6 °F (37 °C)] 98.6 °F (37 °C)  Heart Rate:  [] 96  Resp:  [16-22] 18  BP: (114-151)/(67-98) 119/82  Physical Exam  Constitutional:       Appearance: He is obese. He is bright and interactive appearing.      Comments:  No distress.  No family present.   HENT:      Head: Normocephalic.      Comments: Postop dressing around head removed.    Drain present.  Eyes:      Conjunctiva/sclera: Conjunctivae normal.    Cardiovascular:      Rate and Rhythm: Normal rate. Rhythm irregularly irregular and variable at 97.      Heart sounds: Normal heart sounds.   Pulmonary:      Effort: Pulmonary effort is normal. No respiratory distress.      Breath sounds: No wheezing or rales.   Abdominal:      General: Bowel sounds are normal. There is no distension.      Palpations: Abdomen is soft.      Tenderness: There is no abdominal tenderness.    Musculoskeletal:         General: Swelling (trace) present. No tenderness or deformity. Normal range of motion.   Skin:     General: Skin is warm and dry.      Findings: No rash.   Neurological:      Mental Status: He is alert and oriented to person, place, and time.       Motor: No abnormal muscle tone.   Psychiatric:         Mood and Affect: Mood normal.         Behavior: Behavior normal.         Thought Content: Thought content normal.         Judgment: Judgment normal.     Results Review:  I have reviewed the labs, radiology results, and diagnostic studies.    Laboratory Data:   Results from last 7 days   Lab Units 04/12/23  0939 04/09/23 0317 04/07/23  0249   WBC 10*3/mm3 10.00 14.47* 17.47*   HEMOGLOBIN g/dL 11.1* 10.8* 11.0*   HEMATOCRIT % 36.1* 33.3* 34.5*   PLATELETS 10*3/mm3 209 229 246        Results from last 7 days   Lab Units 04/12/23 0939 04/09/23 0317 04/07/23  0249   SODIUM mmol/L 142 140 137   POTASSIUM mmol/L 4.1 3.8 4.0   CHLORIDE mmol/L 105 105 102   CO2 mmol/L 27.0 28.0 23.0   BUN mg/dL 16 24* 41*   CREATININE mg/dL 0.94 0.96 1.14   CALCIUM mg/dL 8.0* 7.7* 8.1*   BILIRUBIN mg/dL 0.4  --   --    ALK PHOS U/L 87  --   --    ALT (SGPT) U/L 29  --   --    AST (SGOT) U/L 25  --   --    GLUCOSE mg/dL 135* 152* 240*       Culture Data:   No results found for: BLOODCX, URINECX, WOUNDCX, MRSACX, RESPCX, STOOLCX    Radiology Data:   Imaging Results (Last 24 Hours)     ** No results found for the last 24 hours. **          I have reviewed the patient's current medications.     Assessment/Plan   Assessment  Active Hospital Problems    Diagnosis    • **Swelling of scalp    • COPD (chronic obstructive pulmonary disease)    • Obesity (BMI 30-39.9)    • Tobacco abuse, in remission    • Acute systolic heart failure    • ADDIE (obstructive sleep apnea)    • Type 2 myocardial infarction due to arrhythmia    • Acute pulmonary edema due to A-fib RVR    • Acute respiratory failure with hypoxia    • Intracranial  epidural hematoma    • Post-operative infection    • Paroxysmal atrial fibrillation with rapid ventricular response    • Type 2 diabetes mellitus with hyperglycemia and peripheral neuropathy, with long-term current use of insulin (HCC)    • Coronary artery disease    • Meningioma s/p craniotomy        Treatment Plan  Continue sliding scale insulin  Continue rate controlling medications with Cardizem and digoxin  Wean nasal O2    Medical Decision Making   Number and Complexity of problems:   1) respiratory failure with hypoxia secondary to pulmonary edema which is secondary to A-fib RVR, acute, moderate complexity  2) PAF with RVR, acute, moderate complexity  3) meningioma versus intracranial infection, acute, high complexity  4) type 2 diabetes, chronic, moderate complexity     Differential Diagnosis: Pulmonary embolus, LV dysfunction     Conditions and Status        Condition is unchanged.     [facility] Data  External documents reviewed: Care everywhere documentation  Cardiac tracing (EKG, telemetry) interpretation: See HPI  Radiology interpretation: See HPI  Labs reviewed: See HPI  Any tests that were considered but not ordered: None     Decision rules/scores evaluated (example [id number], [place], etc): [id number] score of 5 with a 7.2% risk of stroke annually     Discussed with: The patient and nursing     Care Planning  Shared decision making: The patient  Code status and discussions: Full code     Disposition  Social Determinants of Health that impact treatment or disposition: None  I expect the patient to be discharged by the primary service.    Electronically signed by Ramin Singh DO, 04/13/23, 15:00 CDT.

## 2023-04-13 NOTE — THERAPY RE-EVALUATION
Patient Name: Elijah Escobar  : 1955    MRN: [id number]                              Today's Date: 2023       Admit Date: 3/31/2023    Visit Dx:     ICD-10-CM ICD-9-CM   1. Intracranial epidural hematoma  S06.4X0A 852.40   2. Impaired mobility  Z74.09 799.89   3. S/P craniotomy  Z98.890 V45.89   4. Infection of deep incisional surgical site after procedure, initial encounter  T81.42XA 998.59     Patient Active Problem List   Diagnosis   • Meningioma s/p craniotomy   • Hypertension   • GERD (gastroesophageal reflux disease)   • Coronary artery disease   • Class 2 severe obesity due to excess calories with serious comorbidity and body mass index (BMI) of 38.0 to 38.9 in adult   • Type 2 diabetes mellitus with hyperglycemia and peripheral neuropathy, with long-term current use of insulin (HCC)   • Leukocytosis   • Other specified anemias   • Paroxysmal atrial fibrillation with rapid ventricular response   • Post-operative infection   • Smoker   • History of cranioplasty   • Facial edema   • Swelling of scalp   • Acute pulmonary edema due to A-fib RVR   • Acute respiratory failure with hypoxia   • Type 2 myocardial infarction due to arrhythmia   • COPD (chronic obstructive pulmonary disease)   • Obesity (BMI 30-39.9)   • Tobacco abuse, in remission   • Acute systolic heart failure   • ADDIE (obstructive sleep apnea)   • Intracranial epidural hematoma     Past Medical History:   Diagnosis Date   • Brain tumor    • Coronary artery disease    • COVID-19 vaccine series completed     MODERNA X 3; LAST DOSE 3/2022   • Diabetes    • Erectile dysfunction    • GERD (gastroesophageal reflux disease)    • Hypercholesteremia    • Hypertension    • Seizure      Past Surgical History:   Procedure Laterality Date   • BALLOON ANGIOPLASTY, ARTERY Right    • COLONOSCOPY     • CRANIECTOMY Right 2022    Procedure: CRANIECTOMY WITH INCISIONAL WASHOUT;  Surgeon: Felipe Banerjee MD;  Location: [facility] OR;  Service:  Neurosurgery;  Laterality: Right;   • CRANIOPLASTY Right 10/4/2022    Procedure: CRANIOPLASTY right with Lumbar drain;  Surgeon: Flaco Portillo MD;  Location:  [facility] OR;  Service: Neurosurgery;  Laterality: Right;   • CRANIOPLASTY N/A 4/4/2023    Procedure: CRANIOPLASTY, removal, right, horseshoe;  Surgeon: Flaco Portillo MD;  Location:  [facility] OR;  Service: Neurosurgery;  Laterality: N/A;   • CRANIOTOMY Right 4/12/2023    Procedure: CRANIOTOMY; evacuation of right hematoma;  Surgeon: Flaco Portillo MD;  Location:  [facility] OR;  Service: Neurosurgery;  Laterality: Right;   • CRANIOTOMY FOR TUMOR Right 6/16/2022    Procedure: CRANIOTOMY FOR TUMOR STERIOTACTIC WITH BRAIN LAB, right;  Surgeon: Flaco Portillo MD;  Location:  [facility] OR;  Service: Neurosurgery;  Laterality: Right;      General Information     Row Name 04/13/23 0938          Physical Therapy Time and Intention    Document Type re-evaluation  s/p cranitomy, evacuation of R hematoma with blanca placement  -MS     Mode of Treatment physical therapy  -MS     Row Name 04/13/23 0938          General Information    Patient Profile Reviewed yes  -MS     Existing Precautions/Restrictions fall;oxygen therapy device and L/min  helmet on when up, blanca drain, 5L O2  -MS     Barriers to Rehab medically complex  -MS     Row Name 04/13/23 0938          Cognition    Orientation Status (Cognition) oriented x 4  -MS     Row Name 04/13/23 0938          Safety Issues, Functional Mobility    Safety Issues Affecting Function (Mobility) safety precautions follow-through/compliance;safety precaution awareness;problem-solving;impulsivity  -MS     Impairments Affecting Function (Mobility) shortness of breath;endurance/activity tolerance  -MS           User Key  (r) = Recorded By, (t) = Taken By, (c) = Cosigned By    Initials Name Provider Type    Jovana Chairez, PT, DPT, NCS Physical Therapist               Mobility    No documentation.                 Obj/Interventions     [facility] Name 04/13/23 0938          Range of Motion Comprehensive    General Range of Motion bilateral upper extremity ROM WFL;bilateral lower extremity ROM WFL  -MS     Row Name 04/13/23 0938          Strength Comprehensive (MMT)    Comment, General Manual Muscle Testing (MMT) Assessment grossly 5/5  -MS     [facility] Name 04/13/23 0938          Motor Skills    Motor Skills functional endurance;coordination  -MS     Coordination WNL;finger to nose;heel to shin  -MS     Functional Endurance fair  -MS     Row Name 04/13/23 0938          Balance    Balance Assessment sitting static balance;sitting dynamic balance;standing static balance;standing dynamic balance  -MS     Static Sitting Balance supervision  -MS     Dynamic Sitting Balance standby assist  -MS     Position, Sitting Balance sitting edge of bed  -MS     Static Standing Balance contact guard  -MS     Dynamic Standing Balance contact guard  -MS     [facility] Name 04/13/23 0938          Sensory Assessment (Somatosensory)    Sensory Assessment (Somatosensory) sensation intact  -MS           User Key  (r) = Recorded By, (t) = Taken By, (c) = Cosigned By    Initials Name Provider Type    MS Jovana Terrazas, PT, DPT, NCS Physical Therapist               Goals/Plan     Row Name 04/13/23 0938          Bed Mobility Goal 1 (PT)    Activity/Assistive Device (Bed Mobility Goal 1, PT) sit to supine;supine to sit;rolling to left;rolling to right  -MS     [place] Level/Cues Needed (Bed Mobility Goal 1, PT) independent  -MS     Time Frame (Bed Mobility Goal 1, PT) long term goal (LTG);by discharge  -MS     Progress/Outcomes (Bed Mobility Goal 1, PT) good progress toward goal  -MS     Row Name 04/13/23 0938          Transfer Goal 1 (PT)    Activity/Assistive Device (Transfer Goal 1, PT) sit-to-stand/stand-to-sit;bed-to-chair/chair-to-bed;walker, rolling  -MS     [place] Level/Cues Needed (Transfer Goal 1, PT) modified independence  -MS     Time Frame  (Transfer Goal 1, PT) long term goal (LTG);by discharge  -MS     Progress/Outcome (Transfer Goal 1, PT) goal revised this date  -MS     Row Name 04/13/23 0938          Gait Training Goal 1 (PT)    Activity/Assistive Device (Gait Training Goal 1, PT) gait (walking locomotion);increase endurance/gait distance;increase energy conservation;decrease fall risk;walker, rolling  -MS     [place] Level (Gait Training Goal 1, PT) standby assist  -MS     Distance (Gait Training Goal 1, PT) 50ft maintaining O2 saturation at or above 92%  -MS     Time Frame (Gait Training Goal 1, PT) long term goal (LTG);by discharge  -MS     Progress/Outcome (Gait Training Goal 1, PT) goal revised this date  -MS     Row Name 04/13/23 0938          Therapy Assessment/Plan (PT)    Planned Therapy Interventions (PT) balance training;bed mobility training;gait training;patient/family education;orthotic fitting/training;transfer training;strengthening;neuromuscular re-education;motor coordination training  -MS           User Key  (r) = Recorded By, (t) = Taken By, (c) = Cosigned By    Initials Name Provider Type    Jovana Chairez MARCELLA, PT, DPT, NCS Physical Therapist               Clinical Impression     Row Name 04/13/23 0938          Plan of Care Review    Plan of Care Reviewed With patient  -MS     Progress improving  -MS     Outcome Evaluation The patient presents alert and oriented x4 with family at bedside and helmet. He required an evacuation of R hematoma cranitomy yesterday, however he is not demonstrating any new neurological symptoms today. He does demonstrate impulsive decision making during mobility at times and decrease safety awareness. He has ful strength and sensation. He has decreased activity tolerance, requires redirection during gait activities, and he is on 5L of supplemental O2. PT will continue to work with him to improve his safety awareness, improve his activity tolerance, and improve his ventilation. Recommend  discharge to SNF.  -MS     Row Name 04/13/23 0938          Therapy Assessment/Plan (PT)    Patient/Family Therapy Goals Statement (PT) get more rehab before going home  -MS     Rehab Potential (PT) good, to achieve stated therapy goals  -MS     Criteria for Skilled Interventions Met (PT) yes;meets criteria;skilled treatment is necessary  -MS     Therapy Frequency (PT) 2 times/day  -MS     Predicted Duration of Therapy Intervention (PT) until discharge  -MS     Row Name 04/13/23 0938          Vital Signs    Pre SpO2 (%) 95  -MS     O2 Delivery Pre Treatment supplemental O2  -MS     O2 Delivery Intra Treatment supplemental O2  -MS     O2 Delivery Post Treatment supplemental O2  -MS           User Key  (r) = Recorded By, (t) = Taken By, (c) = Cosigned By    Initials Name Provider Type    MS Mk Jovana MARCELLA, PT, DPT, NCS Physical Therapist               Outcome Measures     Row Name 04/13/23 1300 04/13/23 0938       How much help from another person do you currently need...    Turning from your back to your side while in flat bed without using bedrails? 4  -AH 3  -MS    Moving from lying on back to sitting on the side of a flat bed without bedrails? 4  -AH 3  -MS    Moving to and from a bed to a chair (including a wheelchair)? 3  -AH 3  -MS    Standing up from a chair using your arms (e.g., wheelchair, bedside chair)? 3  -AH 3  -MS    Climbing 3-5 steps with a railing? 3  -AH 3  -MS    To walk in hospital room? 3  -AH 3  -MS    AM-PAC 6 Clicks Score (PT) 20  -AH 18  -MS    Highest level of mobility 6 --> Walked 10 steps or more  -AH 6 --> Walked 10 steps or more  -MS    Row Name 04/13/23 0912          How much help from another person do you currently need...    Turning from your back to your side while in flat bed without using bedrails? 4  -TB     Moving from lying on back to sitting on the side of a flat bed without bedrails? 4  -TB     Moving to and from a bed to a chair (including a wheelchair)? 3  -TB      Standing up from a chair using your arms (e.g., wheelchair, bedside chair)? 3  -TB     Climbing 3-5 steps with a railing? 3  -TB     To walk in hospital room? 3  -TB     AM-PAC 6 Clicks Score (PT) 20  -TB     Highest level of mobility 6 --> Walked 10 steps or more  -TB     Row Name 04/13/23 1413 04/13/23 1300       Functional Assessment    Outcome Measure Options AM-PAC 6 Clicks Daily Activity (OT)  -J AM-PAC 6 Clicks Basic Mobility (PT)  -    Row Name 04/13/23 0938          Functional Assessment    Outcome Measure Options AM-PAC 6 Clicks Basic Mobility (PT)  -MS           User Key  (r) = Recorded By, (t) = Taken By, (c) = Cosigned By    Initials Name Provider Type     Mary Alice Benton, PTA Physical Therapist Assistant    Jovana Chairez, PT, DPT, NCS Physical Therapist    TB Laura Schmitz, RN Registered Nurse    Coleen Martinez, OTR/L, CSRS Occupational Therapist                             Physical Therapy Education     Title: PT OT SLP Therapies (In Progress)     Topic: Physical Therapy (In Progress)     Point: Mobility training (In Progress)     Learning Progress Summary           Patient Acceptance, E, NR by JAUN at 4/11/2023 0809    Comment: Looking ahead during gait    Acceptance, E,D, DU,VU by JUAN at 4/10/2023 1447    Comment: Safety with transfers, please take time, no impulsiveness    Acceptance, E,TB, VU by CM at 4/6/2023 1350    Acceptance, E, VU by SB at 4/5/2023 1605    Comment: helmet fit, safety, POC    Acceptance, E, VU by CHRISTIANO at 4/3/2023 1353    Comment: gait, safety,    Acceptance, E, VU by SB at 4/1/2023 1405    Comment: pt edu on POC, benefits of act, PLB and d/c plans   Family Acceptance, E,TB, VU by CM at 4/6/2023 1350                   Point: Home exercise program (Done)     Learning Progress Summary           Patient Acceptance, E,TB, VU by CM at 4/6/2023 1350   Family Acceptance, E,TB, VU by CM at 4/6/2023 1350                   Point: Body mechanics (Done)     Learning  Progress Summary           Patient Acceptance, E,TB, VU by CM at 4/6/2023 1350   Family Acceptance, E,TB, VU by CM at 4/6/2023 1350                   Point: Precautions (Done)     Learning Progress Summary           Patient Acceptance, E,TB, VU by CM at 4/6/2023 1350    Acceptance, E, VU by SB at 4/5/2023 1605    Comment: helmet fit, safety, POC    Acceptance, E, VU by SB at 4/1/2023 1405    Comment: pt edu on POC, benefits of act, PLB and d/c plans   Family Acceptance, E,TB, VU by CM at 4/6/2023 1350                               User Key     Initials Effective Dates Name Provider Type Discipline    JUAN 02/03/23 -  Carrie Callaway, PTA Physical Therapist Assistant PT    CHRISTIANO 02/03/23 -  Александр Saravia PTA Physical Therapist Assistant PT    CM 03/02/23 -  Dianelys Carlton RN Registered Nurse Nurse    SB 06/16/21 -  Erika Javier, PT DPT Physical Therapist PT              PT Recommendation and Plan  Planned Therapy Interventions (PT): balance training, bed mobility training, gait training, patient/family education, orthotic fitting/training, transfer training, strengthening, neuromuscular re-education, motor coordination training  Plan of Care Reviewed With: patient  Progress: improving  Outcome Evaluation: The patient presents alert and oriented x4 with family at bedside and helmet. He required an evacuation of R hematoma cranitomy yesterday, however he is not demonstrating any new neurological symptoms today. He does demonstrate impulsive decision making during mobility at times and decrease safety awareness. He has ful strength and sensation. He has decreased activity tolerance, requires redirection during gait activities, and he is on 5L of supplemental O2. PT will continue to work with him to improve his safety awareness, improve his activity tolerance, and improve his ventilation. Recommend discharge to SNF.     Time Calculation:    PT Charges     Row Name 04/13/23 1340 04/13/23 1303 04/13/23 1584        Time Calculation    Start Time 1304  - 0951  - 0937  10 min chart review  -MS    Stop Time 1330  - 1029  - 0950  -MS    Time Calculation (min) 26 min  - 38 min  - 13 min  -MS    PT Received On 04/13/23  - 04/13/23  - 04/13/23  -MS    PT Goal Re-Cert Due Date -- -- 04/23/23  -MS       Time Calculation- PT    Total Timed Code Minutes- PT 26 minute(s)  - 38 minute(s)  -AH --       Timed Charges    73758 - PT Therapeutic Exercise Minutes -- 15  -AH --    94979 - Gait Training Minutes  15  -AH 23  -AH --    90674 - PT Therapeutic Activity Minutes 11  -AH -- --       Untimed Charges    PT Eval/Re-eval Minutes -- -- 23  -MS       Total Minutes    Timed Charges Total Minutes 26  -AH 38  -AH --    Untimed Charges Total Minutes -- -- 23  -MS     Total Minutes 26  - 38  - 23  -MS          User Key  (r) = Recorded By, (t) = Taken By, (c) = Cosigned By    Initials Name Provider Type     Mary Alice Benton, PTA Physical Therapist Assistant    MS Jovana Terrazas, PT, DPT, NCS Physical Therapist              Therapy Charges for Today     Code Description Service Date Service Provider Modifiers Qty    [id number] HC PT RE-EVAL ESTABLISHED PLAN 2 4/13/2023 Jovana Terrazas PT, DPT, NCS GP 1          PT G-Codes  Outcome Measure Options: AM-PAC 6 Clicks Daily Activity (OT)  AM-PAC 6 Clicks Score (PT): 20  AM-PAC 6 Clicks Score (OT): 20  PT Discharge Summary  Anticipated Discharge Disposition (PT): skilled nursing facility    Jovana Terrazas, PT, DPT, NCS  4/13/2023

## 2023-04-13 NOTE — THERAPY RE-EVALUATION
Patient Name: Elijah Escobar  : 1955    MRN: [id number]                              Today's Date: 2023       Admit Date: 3/31/2023    Visit Dx:     ICD-10-CM ICD-9-CM   1. Intracranial epidural hematoma  S06.4X0A 852.40   2. Impaired mobility  Z74.09 799.89   3. S/P craniotomy  Z98.890 V45.89   4. Infection of deep incisional surgical site after procedure, initial encounter  T81.42XA 998.59     Patient Active Problem List   Diagnosis   • Meningioma s/p craniotomy   • Hypertension   • GERD (gastroesophageal reflux disease)   • Coronary artery disease   • Class 2 severe obesity due to excess calories with serious comorbidity and body mass index (BMI) of 38.0 to 38.9 in adult   • Type 2 diabetes mellitus with hyperglycemia and peripheral neuropathy, with long-term current use of insulin (HCC)   • Leukocytosis   • Other specified anemias   • Paroxysmal atrial fibrillation with rapid ventricular response   • Post-operative infection   • Smoker   • History of cranioplasty   • Facial edema   • Swelling of scalp   • Acute pulmonary edema due to A-fib RVR   • Acute respiratory failure with hypoxia   • Type 2 myocardial infarction due to arrhythmia   • COPD (chronic obstructive pulmonary disease)   • Obesity (BMI 30-39.9)   • Tobacco abuse, in remission   • Acute systolic heart failure   • ADDIE (obstructive sleep apnea)   • Intracranial epidural hematoma     Past Medical History:   Diagnosis Date   • Brain tumor    • Coronary artery disease    • COVID-19 vaccine series completed     MODERNA X 3; LAST DOSE 3/2022   • Diabetes    • Erectile dysfunction    • GERD (gastroesophageal reflux disease)    • Hypercholesteremia    • Hypertension    • Seizure      Past Surgical History:   Procedure Laterality Date   • BALLOON ANGIOPLASTY, ARTERY Right    • COLONOSCOPY     • CRANIECTOMY Right 2022    Procedure: CRANIECTOMY WITH INCISIONAL WASHOUT;  Surgeon: Felipe Banerjee MD;  [facility]: [facility] OR;  Service:  Neurosurgery;  Laterality: Right;   • CRANIOPLASTY Right 10/4/2022    Procedure: CRANIOPLASTY right with Lumbar drain;  Surgeon: Flaco Portillo MD;  Location:  [facility] OR;  Service: Neurosurgery;  Laterality: Right;   • CRANIOPLASTY N/A 4/4/2023    Procedure: CRANIOPLASTY, removal, right, horseshoe;  Surgeon: Flaco Portillo MD;  Location:  [facility] OR;  Service: Neurosurgery;  Laterality: N/A;   • CRANIOTOMY Right 4/12/2023    Procedure: CRANIOTOMY; evacuation of right hematoma;  Surgeon: Flaco Portillo MD;  Location:  [facility] OR;  Service: Neurosurgery;  Laterality: Right;   • CRANIOTOMY FOR TUMOR Right 6/16/2022    Procedure: CRANIOTOMY FOR TUMOR STERIOTACTIC WITH BRAIN LAB, right;  Surgeon: Flaco Portillo MD;  Location:  [facility] OR;  Service: Neurosurgery;  Laterality: Right;      General Information     Row Name 04/13/23 1413          OT Time and Intention    Document Type re-evaluation  s/p craniotomy for evacuation of R hematoma on 4/12  -     Mode of Treatment occupational therapy  -     Row Name 04/13/23 1413          General Information    Patient Profile Reviewed yes  -     Existing Precautions/Restrictions fall;oxygen therapy device and L/min  helmet on when up,JUAN drain, 4L O2/NC  -     Barriers to Rehab medically complex  -     Row Name 04/13/23 1413          Cognition    Orientation Status (Cognition) oriented x 4  -     Row Name 04/13/23 1413          Safety Issues, Functional Mobility    Impairments Affecting Function (Mobility) endurance/activity tolerance;shortness of breath  -           User Key  (r) = Recorded By, (t) = Taken By, (c) = Cosigned By    Initials Name Provider Type     Coleen Andres, OTR/L, CSRS Occupational Therapist                 Mobility/ADL's     Row Name 04/13/23 1413          Bed Mobility    Bed Mobility supine-sit;sit-supine  -     Supine-Sit [place] (Bed Mobility) contact guard;verbal cues  -     Sit-Supine  [place] (Bed Mobility) contact guard;verbal cues  -J     Assistive Device (Bed Mobility) head of bed elevated  -J     Row Name 04/13/23 1413          Transfers    Transfers sit-stand transfer;stand-sit transfer  -JJ     Row Name 04/13/23 1413          Sit-Stand Transfer    Sit-Stand [place] (Transfers) verbal cues;contact guard  -J     Row Name 04/13/23 1413          Stand-Sit Transfer    Stand-Sit [place] (Transfers) verbal cues;contact guard  -     Assistive Device (Stand-Sit Transfers) walker, front-wheeled  -     Row Name 04/13/23 1413          Activities of Daily Living    BADL Assessment/Intervention upper body dressing;lower body dressing  -     Row Name 04/13/23 1413          Lower Body Dressing Assessment/Training    [place] Level (Lower Body Dressing) don;socks;contact guard assist  -JJ     Position (Lower Body Dressing) unsupported sitting  -[facility] Name 04/13/23 1413          Upper Body Dressing Assessment/Training    [place] Level (Upper Body Dressing) don;minimum assist (75% patient effort)  -J     Position (Upper Body Dressing) edge of bed sitting  -     Comment, (Upper Body Dressing) helmet - to manage around blanca  -JJ           User Key  (r) = Recorded By, (t) = Taken By, (c) = Cosigned By    Initials Name Provider Type    Coleen Martinez OTR/L, CSRS Occupational Therapist               Obj/Interventions     Row Name 04/13/23 1413          Sensory Assessment (Somatosensory)    Sensory Assessment (Somatosensory) UE sensation intact  -[facility] Name 04/13/23 1413          Vision Assessment/Intervention    Visual Impairment/Limitations peripheral vision impaired right  baseline from tumor that was removed in June 2022  -     Row Name 04/13/23 1413          Range of Motion Comprehensive    General Range of Motion bilateral upper extremity ROM WFL;bilateral lower extremity ROM WFL  -     Row Name 04/13/23 1413          Strength Comprehensive (MMT)     General Manual Muscle Testing (MMT) Assessment no strength deficits identified  -JJ     Row Name 04/13/23 1413          Motor Skills    Motor Skills coordination  -JJ     Coordination WFL;finger to nose;heel to shin  -JJ           User Key  (r) = Recorded By, (t) = Taken By, (c) = Cosigned By    Initials Name Provider Type    TITIColeen Hodges, OTR/L, CSRS Occupational Therapist               Goals/Plan     Row Name 04/13/23 1413          Transfer Goal 1 (OT)    Activity/Assistive Device (Transfer Goal 1, OT) toilet;shower chair  -JJ     [place] Level/Cues Needed (Transfer Goal 1, OT) independent  -JJ     Time Frame (Transfer Goal 1, OT) long term goal (LTG)  -JJ     Progress/Outcome (Transfer Goal 1, OT) new goal  -JJ     Row Name 04/13/23 1413          Dressing Goal 1 (OT)    Activity/Device (Dressing Goal 1, OT) lower body dressing  -JJ     [place]/Cues Needed (Dressing Goal 1, OT) independent  -JJ     Time Frame (Dressing Goal 1, OT) long term goal (LTG)  -JJ     Progress/Outcome (Dressing Goal 1, OT) new goal  -JJ     Row Name 04/13/23 1413          Toileting Goal 1 (OT)    Activity/Device (Toileting Goal 1, OT) toileting skills, all  -JJ     [place] Level/Cues Needed (Toileting Goal 1, OT) independent  -JJ     Time Frame (Toileting Goal 1, OT) long term goal (LTG);by discharge  -JJ     Progress/Outcome (Toileting Goal 1, OT) new goal  -JJ     Row Name 04/13/23 1413          Therapy Assessment/Plan (OT)    Planned Therapy Interventions (OT) activity tolerance training;adaptive equipment training;BADL retraining;functional balance retraining;transfer/mobility retraining;strengthening exercise;occupation/activity based interventions;patient/caregiver education/training  -JJ           User Key  (r) = Recorded By, (t) = Taken By, (c) = Cosigned By    Initials Name Provider Type    Coleen Martinez, OTR/L, CSRS Occupational Therapist               Clinical Impression     Row Name 04/13/23  1423          Pain Assessment    Pretreatment Pain Rating 0/10 - no pain  -     Posttreatment Pain Rating 0/10 - no pain  -     Pain Intervention(s) Medication (See MAR);Repositioned;Ambulation/increased activity  -     Row Name 04/13/23 1423          Plan of Care Review    Plan of Care Reviewed With patient  -     Outcome Evaluation OT re-eval completed. Pt presents alert and oriented x4, resting comfortably in bed. He was educated on need for helmet to be donned prior to OOB activity. He came to sitting at EOB with CGA. Adjusted B socks with set-up and CGA. Donned helmet with Min A for mgt of JUAN line. CGA for sit <> stand t/f from EOB and to take small side steps towards HOB. He declined further mobility at this time d/t fatigue. He would benefit from skilled OT services to address these deficits. Recommend d/c to short term rehab prior to returning home.  -     Row Name 04/13/23 1423          Therapy Assessment/Plan (OT)    Rehab Potential (OT) good, to achieve stated therapy goals  -     Criteria for Skilled Therapeutic Interventions Met (OT) yes;skilled treatment is necessary  -     Therapy Frequency (OT) 3 times/wk  -     Row Name 04/13/23 1423          Therapy Plan Review/Discharge Plan (OT)    Anticipated Discharge Disposition (OT) sub acute care setting;skilled nursing facility  -     Row Name 04/13/23 1423          Positioning and Restraints    Pre-Treatment Position in bed  -     Post Treatment Position bed  -JJ     In Bed notified nsg;fowlers;call light within reach;encouraged to call for assist;side rails up x3;exit alarm on  -           User Key  (r) = Recorded By, (t) = Taken By, (c) = Cosigned By    Initials Name Provider Type     Coleen Andres, ERONR/L, CSRS Occupational Therapist               Outcome Measures     Row Name 04/13/23 1413          How much help from another is currently needed...    Putting on and taking off regular lower body clothing? 3  -     Bathing  (including washing, rinsing, and drying) 3  -JJ     Toileting (which includes using toilet bed pan or urinal) 3  -JJ     Putting on and taking off regular upper body clothing 3  -JJ     Taking care of personal grooming (such as brushing teeth) 4  -JJ     Eating meals 4  -JJ     AM-PAC 6 Clicks Score (OT) 20  -JJ     Row Name 04/13/23 1300 04/13/23 0938       How much help from another person do you currently need...    Turning from your back to your side while in flat bed without using bedrails? 4  -AH 3  -MS    Moving from lying on back to sitting on the side of a flat bed without bedrails? 4  -AH 3  -MS    Moving to and from a bed to a chair (including a wheelchair)? 3  -AH 3  -MS    Standing up from a chair using your arms (e.g., wheelchair, bedside chair)? 3  -AH 3  -MS    Climbing 3-5 steps with a railing? 3  -AH 3  -MS    To walk in hospital room? 3  -AH 3  -MS    AM-PAC 6 Clicks Score (PT) 20  -AH 18  -MS    Highest level of mobility 6 --> Walked 10 steps or more  -AH 6 --> Walked 10 steps or more  -MS    Row Name 04/13/23 0912          How much help from another person do you currently need...    Turning from your back to your side while in flat bed without using bedrails? 4  -TB     Moving from lying on back to sitting on the side of a flat bed without bedrails? 4  -TB     Moving to and from a bed to a chair (including a wheelchair)? 3  -TB     Standing up from a chair using your arms (e.g., wheelchair, bedside chair)? 3  -TB     Climbing 3-5 steps with a railing? 3  -TB     To walk in hospital room? 3  -TB     AM-PAC 6 Clicks Score (PT) 20  -TB     Highest level of mobility 6 --> Walked 10 steps or more  -TB     Row Name 04/13/23 1413 04/13/23 1300       Functional Assessment    Outcome Measure Options AM-PAC 6 Clicks Daily Activity (OT)  -JJ AM-PAC 6 Clicks Basic Mobility (PT)  -AH    Row Name 04/13/23 0938          Functional Assessment    Outcome Measure Options AM-PAC 6 Clicks Basic Mobility (PT)  -MS            User Key  (r) = Recorded By, (t) = Taken By, (c) = Cosigned By    Initials Name Provider Type    Mary Alice Gunderson, PTA Physical Therapist Assistant    Jovana Chairez, PT, DPT, NCS Physical Therapist    Laura Roman, RN Registered Nurse    Coleen Martinez, OTR/L, CSRS Occupational Therapist                Occupational Therapy Education     Title: PT OT SLP Therapies (In Progress)     Topic: Occupational Therapy (Done)     Point: ADL training (Done)     Description:   Instruct learner(s) on proper safety adaptation and remediation techniques during self care or transfers.   Instruct in proper use of assistive devices.              Learning Progress Summary           Patient Acceptance, E, VU by MENDEL at 4/13/2023 1442    Acceptance, E, VU by MENDEL at 4/8/2023 1453    Acceptance, E,TB, VU by AMY at 4/6/2023 1350    Acceptance, E, VU by MENDEL at 4/5/2023 1245    Acceptance, E, VU by MENDEL at 4/3/2023 0811    Acceptance, E, VU,NR by GABRIELA at 4/1/2023 1445   Family Acceptance, E,TB, VU by CM at 4/6/2023 1350                   Point: Home exercise program (Done)     Description:   Instruct learner(s) on appropriate technique for monitoring, assisting and/or progressing therapeutic exercises/activities.              Learning Progress Summary           Patient Acceptance, E, VU by MENDLE at 4/8/2023 1453    Acceptance, E,TB, VU by AMY at 4/6/2023 1350    Acceptance, E, VU,NR by GABRIELA at 4/1/2023 1445   Family Acceptance, E,TB, VU by CM at 4/6/2023 1350                   Point: Precautions (Done)     Description:   Instruct learner(s) on prescribed precautions during self-care and functional transfers.              Learning Progress Summary           Patient Acceptance, E, VU by MENDEL at 4/13/2023 1442    Acceptance, E, VU by MENDEL at 4/8/2023 1453    Acceptance, E,TB, VU by CM at 4/6/2023 1350    Acceptance, E, VU by MENDEL at 4/5/2023 1245    Acceptance, E, VU by MENDEL at 4/3/2023 0811    Acceptance, E, VU,NR by GABRIELA at 4/1/2023  1445   Family Acceptance, E,TB, VU by CM at 4/6/2023 1350                   Point: Body mechanics (Done)     Description:   Instruct learner(s) on proper positioning and spine alignment during self-care, functional mobility activities and/or exercises.              Learning Progress Summary           Patient Acceptance, E, VU by JJ at 4/8/2023 1453    Acceptance, E,TB, VU by CM at 4/6/2023 1350    Acceptance, E, VU by JTITI at 4/5/2023 1245    Acceptance, E, VU by JJ at 4/3/2023 0811    Acceptance, E, VU,NR by  at 4/1/2023 1445   Family Acceptance, E,TB, VU by CM at 4/6/2023 1350                               User Key     Initials Effective Dates Name Provider Type Discipline     02/03/23 -  Coretta Sdidiqi, OTR/L, CNT Occupational Therapist OT     03/02/23 -  Dianelys Carlton, RN Registered Nurse Nurse    MENDEL 11/10/21 -  Coleen Andres OTR/L, CSRS Occupational Therapist OT              OT Recommendation and Plan  Planned Therapy Interventions (OT): activity tolerance training, adaptive equipment training, BADL retraining, functional balance retraining, transfer/mobility retraining, strengthening exercise, occupation/activity based interventions, patient/caregiver education/training  Therapy Frequency (OT): 3 times/wk  Plan of Care Review  Plan of Care Reviewed With: patient  Progress: no change  Outcome Evaluation: OT re-eval completed. Pt presents alert and oriented x4, resting comfortably in bed. He was educated on need for helmet to be donned prior to OOB activity. He came to sitting at EOB with CGA. Adjusted B socks with set-up and CGA. Donned helmet with Min A for mgt of JUAN line. CGA for sit <> stand t/f from EOB and to take small side steps towards HOB. He declined further mobility at this time d/t fatigue. He would benefit from skilled OT services to address these deficits. Recommend d/c to short term rehab prior to returning home.     Time Calculation:    Time Calculation- OT     Row Name 04/13/23  1413 04/13/23 1340 04/13/23 1303       Time Calculation- OT    OT Start Time 1413  add 6 for CR  - -- --    OT Stop Time 1430  - -- --    OT Time Calculation (min) 17 min  -TITI -- --    OT Received On 04/13/23  -MENDEL -- --    OT Goal Re-Cert Due Date 04/23/23  -MENDEL -- --       Timed Charges    50077 - Gait Training Minutes  -- 15  -AH 23  -AH       Total Minutes    Timed Charges Total Minutes -- 15  -AH 23  -AH     Total Minutes -- 15  -AH 23  -AH          User Key  (r) = Recorded By, (t) = Taken By, (c) = Cosigned By    Initials Name Provider Type    Mary Alice Gunderson, PTA Physical Therapist Assistant    Coleen Martinez OTR/L, CSRS Occupational Therapist              Therapy Charges for Today     Code Description Service Date Service Provider Modifiers Qty    [id number] HC OT RE-EVAL 2 4/13/2023 Coleen Andres OTR/L, CSRS GO 1               PETER [place]/L, CSRS  4/13/2023

## 2023-04-13 NOTE — PLAN OF CARE
Goal Outcome Evaluation:  Plan of Care Reviewed With: patient        Progress: improving  Outcome Evaluation: The patient presents alert and oriented x4 with family at bedside and helmet. He required an evacuation of R hematoma cranitomy yesterday, however he is not demonstrating any new neurological symptoms today. He does demonstrate impulsive decision making during mobility at times and decrease safety awareness. He has ful strength and sensation. He has decreased activity tolerance, requires redirection during gait activities, and he is on 5L of supplemental O2. PT will continue to work with him to improve his safety awareness, improve his activity tolerance, and improve his ventilation. Recommend discharge to SNF.

## 2023-04-14 NOTE — PROGRESS NOTES
"Infectious Diseases Progress Note    Patient:  Elijah Escobar  YOB: 1955  MRN: [id number]   Admit date: 3/31/2023   Admitting Physician: Flaco Portillo, *  Primary Care Physician: Brianna Martinez APRN    Chief Complaint/Interval History: He underwent drainage of intracranial epidural hematoma.  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  Tolerating meropenem without side effect.    Intake/Output Summary (Last 24 hours) at 4/14/2023 0928  Last data filed at 4/14/2023 0700  Gross per 24 hour   Intake 240 ml   Output 5 ml   Net 235 ml     Allergies: No Known Allergies  Current Scheduled Medications:   aspirin, 81 mg, Oral, Daily  atorvastatin, 20 mg, Oral, Nightly  budesonide-formoterol, 2 puff, Inhalation, BID - RT  digoxin, 250 mcg, Oral, Daily  dilTIAZem CD, 300 mg, Oral, Q24H  furosemide, 40 mg, Oral, Daily  gabapentin, 300 mg, Oral, TID  insulin detemir, 10 Units, Subcutaneous, Q12H  insulin lispro, 0-14 Units, Subcutaneous, TID AC  ipratropium, 0.5 mg, Nebulization, 4x Daily - RT  levETIRAcetam, 500 mg, Oral, BID  meropenem, 1 g, Intravenous, Q8H  methylnaltrexone, 12 mg, Subcutaneous, Every Other Day  metoprolol tartrate, 50 mg, Oral, Q12H  pantoprazole, 40 mg, Oral, Q AM  polyethylene glycol, 17 g, Oral, Daily  potassium chloride, 20 mEq, Oral, Daily  sennosides-docusate, 1 tablet, Oral, Daily  sodium chloride, 10 mL, Intravenous, Q12H      Current PRN Medications:  •  acetaminophen **OR** acetaminophen **OR** acetaminophen  •  butalbital-acetaminophen-caffeine  •  dextrose  •  dextrose  •  glucagon (human recombinant)  •  nicotine  •  ondansetron **OR** ondansetron  •  oxyCODONE-acetaminophen  •  oxyCODONE-acetaminophen  •  sodium chloride  •  sodium chloride    Review of Systems see HPI    Vital Signs:  /68 (BP Location: Right arm, Patient Position: Lying)   Pulse 98   Temp 97.8 °F (36.6 °C) (Oral)   Resp 20   Ht 177.8 cm (70\")   Wt 123 kg (270 lb 4.5 oz)   " SpO2 98%   BMI 38.78 kg/m²     Physical Exam  Vital signs - reviewed.  Line/IV site - No erythema, warmth, induration, or tenderness.  Alert and interactive  Up to chair currently  He looks comfortable    Lab Results:  CBC:   Results from last 7 days   Lab Units 04/12/23  0939 04/09/23  0317   WBC 10*3/mm3 10.00 14.47*   HEMOGLOBIN g/dL 11.1* 10.8*   HEMATOCRIT % 36.1* 33.3*   PLATELETS 10*3/mm3 209 229     BMP:  Results from last 7 days   Lab Units 04/12/23  0939 04/09/23  0317   SODIUM mmol/L 142 140   POTASSIUM mmol/L 4.1 3.8   CHLORIDE mmol/L 105 105   CO2 mmol/L 27.0 28.0   BUN mg/dL 16 24*   CREATININE mg/dL 0.94 0.96   GLUCOSE mg/dL 135* 152*   CALCIUM mg/dL 8.0* 7.7*   ALT (SGPT) U/L 29  --      Culture Results:   Date Value Ref Range Status   04/01/2023 Heavy growth (4+) Serratia marcescens (A)   Final      Forehead aspirate 4/1/2023:  Susceptibility                Serratia marcescens       STALIN       Cefepime <=1 ug/ml Susceptible       Ceftazidime <=1 ug/ml Susceptible       Ceftriaxone <=1 ug/ml Susceptible       Gentamicin <=1 ug/ml Susceptible       Levofloxacin <=0.12 ug/ml Susceptible       Piperacillin + Tazobactam   Susceptible 1       Tetracycline >=16 ug/ml Resistant       Trimethoprim + Sulfamethoxazole <=20 ug/ml Susceptible       Radiology: None  Additional Studies Reviewed: None    Impression:   He had had infection right frontal cranioplasty.  Serratia had been recovered from culture.  There was some purulent material above and below the flap.  All of his foreign material was previously removed.  On the 12th he underwent drainage of epidural hematoma.  He appears to be stable at present.    Recommendations:   Continue IV antibiotic treatment with meropenem  Planning IV antibiotic treatment through April 20  Then planning additional 2 weeks of oral levofloxacin  He will need nursing home care to complete his antibiotic treatment  Nursing home antibiotic recommendations outlined below  Will  see again Monday if he remains hospitalized-Dr. Mccollum available in the interim if additional questions    Nursing home antibiotic recommendations:  1.  Diagnosis-infection right frontal cranioplasty-purulent material above and below flap but all findings extradural.  Serratia marcescens recovered on culture.  2.  Neurosurgeon-Kyrie Portillo MD  3.  Antibiotic recommendation:  Meropenem 1 g IV every 8 hours through April 20, 2023  OR  Ertapenem 1 g IV daily through April 20, 2023  (Nursing home to choose which antibiotic would be the best option for them from frequency of infusion and cost standpoint)  Discontinue meropenem or ertapenem after last doses on April 20, 2023  AND  Begin levofloxacin 500 mg orally daily for 2 weeks beginning April 21, 2023  4.  Follow-up appointment with infectious diseases in 2 to 3 weeks    Edwin Jeffers MD

## 2023-04-14 NOTE — THERAPY TREATMENT NOTE
Acute Care - Physical Therapy Treatment Note   [place]     Patient Name: Elijah Escobar  : 1955  MRN: [id number]  Today's Date: 2023      Visit Dx:     ICD-10-CM ICD-9-CM   1. Intracranial epidural hematoma  S06.4X0A 852.40   2. Impaired mobility  Z74.09 799.89   3. S/P craniotomy  Z98.890 V45.89   4. Infection of deep incisional surgical site after procedure, initial encounter  T81.42XA 998.59     Patient Active Problem List   Diagnosis   • Meningioma s/p craniotomy   • Hypertension   • GERD (gastroesophageal reflux disease)   • Coronary artery disease   • Class 2 severe obesity due to excess calories with serious comorbidity and body mass index (BMI) of 38.0 to 38.9 in adult   • Type 2 diabetes mellitus with hyperglycemia and peripheral neuropathy, with long-term current use of insulin (HCC)   • Leukocytosis   • Other specified anemias   • Paroxysmal atrial fibrillation with rapid ventricular response   • Post-operative infection   • Smoker   • History of cranioplasty   • Facial edema   • Swelling of scalp   • Acute pulmonary edema due to A-fib RVR   • Acute respiratory failure with hypoxia   • Type 2 myocardial infarction due to arrhythmia   • COPD (chronic obstructive pulmonary disease)   • Obesity (BMI 30-39.9)   • Tobacco abuse, in remission   • Acute systolic heart failure   • ADDIE (obstructive sleep apnea)   • Intracranial epidural hematoma     Past Medical History:   Diagnosis Date   • Brain tumor    • Coronary artery disease    • COVID-19 vaccine series completed     MODERNA X 3; LAST DOSE 3/2022   • Diabetes    • Erectile dysfunction    • GERD (gastroesophageal reflux disease)    • Hypercholesteremia    • Hypertension    • Seizure      Past Surgical History:   Procedure Laterality Date   • BALLOON ANGIOPLASTY, ARTERY Right    • COLONOSCOPY     • CRANIECTOMY Right 2022    Procedure: CRANIECTOMY WITH INCISIONAL WASHOUT;  Surgeon: Felipe Banerjee MD;  Location: [facility];  Service:  Neurosurgery;  Laterality: Right;   • CRANIOPLASTY Right 10/4/2022    Procedure: CRANIOPLASTY right with Lumbar drain;  Surgeon: Flaco Portillo MD;  Location:  [facility];  Service: Neurosurgery;  Laterality: Right;   • CRANIOPLASTY N/A 4/4/2023    Procedure: CRANIOPLASTY, removal, right, horseshoe;  Surgeon: Flaco Portillo MD;  Location:  [facility];  Service: Neurosurgery;  Laterality: N/A;   • CRANIOTOMY Right 4/12/2023    Procedure: CRANIOTOMY; evacuation of right hematoma;  Surgeon: Flaco Portillo MD;  Location:  [facility];  Service: Neurosurgery;  Laterality: Right;   • CRANIOTOMY FOR TUMOR Right 6/16/2022    Procedure: CRANIOTOMY FOR TUMOR STERIOTACTIC WITH BRAIN LAB, right;  Surgeon: Flaco Portillo MD;  Location:  [facility];  Service: Neurosurgery;  Laterality: Right;     PT Assessment (last 12 hours)     PT Evaluation and Treatment     Row Name 04/14/23 1325 04/14/23 0903       Physical Therapy Time and Intention    Subjective Information no complaints  -TB complains of  Breathing hard  -TB    Document Type therapy note (daily note)  -TB therapy note (daily note)  -TB    Mode of Treatment physical therapy  -TB physical therapy  -TB    Patient Effort -- good  -TB    Row Name 04/14/23 1325 04/14/23 0903       General Information    Existing Precautions/Restrictions fall;oxygen therapy device and L/min  Helmet OOB, Festus drain, 4L O2  -TB fall;oxygen therapy device and L/min  Helmet when OOB, 4L O2, Festus drain  -TB    Row Name 04/14/23 1325 04/14/23 0903       Pain    Pretreatment Pain Rating 0/10 - no pain  -TB 0/10 - no pain  -TB    Posttreatment Pain Rating 0/10 - no pain  -TB 0/10 - no pain  -TB    Row Name 04/14/23 1325 04/14/23 0903       Bed Mobility    Bed Mobility sit-supine;scooting/bridging  -TB --    Scooting/Bridging [place] (Bed Mobility) modified independence  -TB modified independence  -TB    Supine-Sit [place] (Bed Mobility) -- modified independence  -TB     Sit-Supine [place] (Bed Mobility) modified independence  -TB --    Assistive Device (Bed Mobility) head of bed elevated  -TB head of bed elevated  -TB    Row Name 04/14/23 1325 04/14/23 0903       Transfers    Transfers sit-stand transfer;stand-sit transfer  -TB sit-stand transfer;stand-sit transfer  -TB    Row Name 04/14/23 1325 04/14/23 0903       Sit-Stand Transfer    Sit-Stand [place] (Transfers) standby assist  -TB standby assist  -TB    Assistive Device (Sit-Stand Transfers) walker, front-wheeled  -TB walker, front-wheeled  -TB    Row Name 04/14/23 1325 04/14/23 0903       Stand-Sit Transfer    Stand-Sit [place] (Transfers) standby assist  -TB standby assist  -TB    Assistive Device (Stand-Sit Transfers) walker, front-wheeled  -TB walker, front-wheeled  -TB    Row Name 04/14/23 1325 04/14/23 0903       Gait/Stairs (Locomotion)    [place] Level (Gait) contact guard;verbal cues  -TB contact guard;verbal cues  -TB    Assistive Device (Gait) walker, front-wheeled  -TB walker, front-wheeled  -TB    Distance in Feet (Gait) 200'x2  -'x2  -TB    Pattern (Gait) step-through  -TB step-through  -TB    Deviations/Abnormal Patterns (Gait) stride length decreased  -TB stride length decreased  -TB    Row Name 04/14/23 0903          Balance    Balance Assessment sitting static balance;sitting dynamic balance  -TB     Static Sitting Balance supervision  -TB     Dynamic Sitting Balance supervision  -TB     Position, Sitting Balance sitting edge of bed  -TB     Comment, Balance Sat EOB ~15mins  -TB     Row Name 04/14/23 0903          Motor Skills    Comments, Therapeutic Exercise AROM BLE x15  -TB     Row Name             Wound 04/04/23 1111 Right temporal region Incision    Wound - Properties Group Placement Date: 04/04/23  -CHRISTIANO Placement Time: 1111  -CHRISTIANO Side: Right  -CHRISTIANO Location: temporal region  -CHRISTIANO Primary Wound Type: Incision  -CHRISTIANO    Retired Wound - Properties Group Placement Date: 04/04/23  -CHRISTIANO  "Placement Time: 1111  -CHRISTIANO Side: Right  -CHRISTIANO Location: temporal region  -CHRISTIANO Primary Wound Type: Incision  -CHRISTIANO    Retired Wound - Properties Group Date first assessed: 04/04/23  -CHRISTIANO Time first assessed: 1111  -CHRISTIANO Side: Right  -CHRISTIANO Location: temporal region  -CHRISTIANO Primary Wound Type: Incision  -CHRISTIANO    Row Name             Wound 04/12/23 1622 Right scalp Incision    Wound - Properties Group Placement Date: 04/12/23  -SAFIA Placement Time: 1622 -SAFIA Side: Right  -SAFIA Location: scalp  -SAFIA Primary Wound Type: Incision  -SAFIA    Retired Wound - Properties Group Placement Date: 04/12/23  -SAFIA Placement Time: 1622 -SAFIA Side: Right  -SAFIA Location: scalp  -SAFIA Primary Wound Type: Incision  -SAFIA    Retired Wound - Properties Group Date first assessed: 04/12/23  -SAFIA Time first assessed: 1622 -SAFIA Side: Right  -SAFIA Location: scalp  -SAFIA Primary Wound Type: Incision  -SAFIA    Row Name 04/14/23 0903          Plan of Care Review    Plan of Care Reviewed With patient  -TB     Progress improving  -TB     Outcome Evaluation Pt in bed c/o feeling like he is \"breathing heavy\". O2 sat % on 3.5L and -120s. Mod Ind to EOB w/ HOB elevated. Sat EOB ~15mins to see if feeling subsides. Performed AROM BLE x15. Stood SBA w/ RW. Amb 100'x2 w/ RW and CGA. Pt states he feels good walking. Once back to room and seated pt started to feel the \"heavy breathing\" again. Noted HR was 145-149. Notififed RN. After a few mins pts HR was ranging 100-115s.  -TB     Row Name 04/14/23 1325 04/14/23 0903       Positioning and Restraints    Pre-Treatment Position sitting in chair/recliner  -TB in bed  -TB    Post Treatment Position bed  -TB chair  -TB    In Bed fowlers;call light within reach;encouraged to call for assist;exit alarm on;side rails up x2  -TB --    In Chair -- notified nsg;reclined;sitting;call light within reach;encouraged to call for assist;exit alarm on;legs elevated;heels elevated  -TB          User Key  (r) = Recorded By, (t) = Taken By, (c) = Cosigned " By    Initials Name Provider Type    Kierra Conroy, RN Registered Nurse    Ricky George RN Registered Nurse    Tere Berger PTA Physical Therapist Assistant                Physical Therapy Education     Title: PT OT SLP Therapies (In Progress)     Topic: Physical Therapy (In Progress)     Point: Mobility training (In Progress)     Learning Progress Summary           Patient Acceptance, E, NR by JUAN at 4/11/2023 0809    Comment: Looking ahead during gait    Acceptance, E,D, DU,VU by JUAN at 4/10/2023 1447    Comment: Safety with transfers, please take time, no impulsiveness    Acceptance, E,TB, VU by CM at 4/6/2023 1350    Acceptance, E, VU by SB at 4/5/2023 1605    Comment: helmet fit, safety, POC    Acceptance, E, VU by CHRISTIANO at 4/3/2023 1353    Comment: gait, safety,    Acceptance, E, VU by SB at 4/1/2023 1405    Comment: pt edu on POC, benefits of act, PLB and d/c plans   Family Acceptance, E,TB, VU by CM at 4/6/2023 1350                   Point: Home exercise program (Done)     Learning Progress Summary           Patient Acceptance, E,TB, VU by CM at 4/6/2023 1350   Family Acceptance, E,TB, VU by CM at 4/6/2023 1350                   Point: Body mechanics (Done)     Learning Progress Summary           Patient Acceptance, E,TB, VU by CM at 4/6/2023 1350   Family Acceptance, E,TB, VU by CM at 4/6/2023 1350                   Point: Precautions (Done)     Learning Progress Summary           Patient Acceptance, E,TB, VU by CM at 4/6/2023 1350    Acceptance, E, VU by SB at 4/5/2023 1605    Comment: helmet fit, safety, POC    Acceptance, E, VU by SB at 4/1/2023 1405    Comment: pt edu on POC, benefits of act, PLB and d/c plans   Family Acceptance, E,TB, VU by CM at 4/6/2023 1350                               User Key     Initials Effective Dates Name Provider Type Discipline    JUAN 02/03/23 -  Carrie Callaway PTA Physical Therapist Assistant PT    CHRISTAINO 02/03/23 -  Александр Saravia PTA Physical  "Therapist Assistant PT    CM 03/02/23 -  Dianelys Carlton, RN Registered Nurse Nurse    SB 06/16/21 -  Erika Javier, PT DPT Physical Therapist PT              PT Recommendation and Plan     Plan of Care Reviewed With: patient  Progress: improving  Outcome Evaluation: Pt in bed c/o feeling like he is \"breathing heavy\". O2 sat % on 3.5L and -120s. Mod Ind to EOB w/ HOB elevated. Sat EOB ~15mins to see if feeling subsides. Performed AROM BLE x15. Stood SBA w/ RW. Amb 100'x2 w/ RW and CGA. Pt states he feels good walking. Once back to room and seated pt started to feel the \"heavy breathing\" again. Noted HR was 145-149. Notififed RN. After a few mins pts HR was ranging 100-115s.   Outcome Measures     Row Name 04/14/23 0903 04/13/23 1300          How much help from another person do you currently need...    Turning from your back to your side while in flat bed without using bedrails? 4  -TB 4  -AH     Moving from lying on back to sitting on the side of a flat bed without bedrails? 4  -TB 4  -AH     Moving to and from a bed to a chair (including a wheelchair)? 4  -TB 3  -AH     Standing up from a chair using your arms (e.g., wheelchair, bedside chair)? 4  -TB 3  -AH     Climbing 3-5 steps with a railing? 3  -TB 3  -AH     To walk in hospital room? 3  -TB 3  -AH     AM-PAC 6 Clicks Score (PT) 22  -TB 20  -AH        Functional Assessment    Outcome Measure Options AM-PAC 6 Clicks Basic Mobility (PT)  -TB AM-PAC 6 Clicks Basic Mobility (PT)  -AH           User Key  (r) = Recorded By, (t) = Taken By, (c) = Cosigned By    Initials Name Provider Type     Mary Alice Benton, [place]LAH Physical Therapist Assistant    Tere Berger, [place]LA[place] Physical Therapist Assistant                 Time Calculation:    PT Charges     Row Name 04/14/23 1509 04/14/23 1029          Time Calculation    Start Time 1325  -TB 0903  -TB     Stop Time 1350  -TB 0941  -TB     Time Calculation (min) 25 min  -TB 38 min  -TB     PT Received On " -- 04/14/23  -TB        Time Calculation- PT    Total Timed Code Minutes- PT 25 minute(s)  -TB 38 minute(s)  -TB           User Key  (r) = Recorded By, (t) = Taken By, (c) = Cosigned By    Initials Name Provider Type    TB Tere Miller, [place]LA[place] Physical Therapist Assistant              Therapy Charges for Today     Code Description Service Date Service Provider Modifiers Qty    72939366853 HC GAIT TRAINING EA 15 MIN 4/14/2023 Tere Miller, PTA GP 2    52714079671 HC PT THER PROC EA 15 MIN 4/14/2023 Tere Miller, PTA GP 1    61112446856 HC GAIT TRAINING EA 15 MIN 4/14/2023 Tere Miller, PTA GP 2          PT G-Codes  Outcome Measure Options: AM-PAC 6 Clicks Basic Mobility (PT)  AM-PAC 6 Clicks Score (PT): 22  AM-PAC 6 Clicks Score (OT): 20    Tere Miller PTA  4/14/2023

## 2023-04-14 NOTE — PLAN OF CARE
Goal Outcome Evaluation:              Outcome Evaluation: Pt A&Ox4. Blood sugar was low at beginning of shift, see other note. Otherwise no changes. Pt states he is ready to go. JUAN drain 5 mL. VSS. all safety measures maintained.

## 2023-04-14 NOTE — PROGRESS NOTES
Neurosurgery Daily Progress Note    HPI:  Elijah Escobar is a 67 y.o. male with a significant medical history of hypertension, diabetes, hyperlipidemia, seizures, craniotomy for tumor (6/16/2022), craniotomy for washout (7/1/2022), craniectomy (10/4/2022), coronary artery disease, diabetes, erectile dysfunction, GERD, hypertension, hyperlipidemia, tobacco abuse, and obesity.  He presents today with a complaint of a 4-5 days onset of progressively worsening right frontal, temporal, and periorbital edema and associated symptoms to include, but not limited to generalized fatigue, chills, dyspnea, intermittent nausea without vomiting, and states he's felt feverish.  Physical exam findings of neurologically intact with right frontal, temporal, and periorbital swelling.  WBC elevated at 15.  3 to an CRP elevated at 14.75.  Imaging pending.    Assessment:   Past Medical History:   Diagnosis Date   • Brain tumor    • Coronary artery disease    • COVID-19 vaccine series completed     MODERNA X 3; LAST DOSE 3/2022   • Diabetes    • Erectile dysfunction    • GERD (gastroesophageal reflux disease)    • Hypercholesteremia    • Hypertension    • Seizure      Active Hospital Problems    Diagnosis    • **Swelling of scalp    • COPD (chronic obstructive pulmonary disease)    • Obesity (BMI 30-39.9)    • Tobacco abuse, in remission    • Acute systolic heart failure    • ADDIE (obstructive sleep apnea)    • Type 2 myocardial infarction due to arrhythmia    • Acute pulmonary edema due to A-fib RVR    • Acute respiratory failure with hypoxia    • Intracranial epidural hematoma    • Post-operative infection    • Paroxysmal atrial fibrillation with rapid ventricular response    • Type 2 diabetes mellitus with hyperglycemia and peripheral neuropathy, with long-term current use of insulin (HCC)    • Coronary artery disease    • Meningioma s/p craniotomy      Plan:   Neuro: Stable, intact/at baseline.  Increased right frontal fluid  "accumulation with associated headache  Infected cranial flap      POD #9 (4/4/2023)  craniectomy and washout    Postop CT stable. No acute abnormalities.    Flap tapped I&D cultures 4+ gram-negative bacilli    Heavy 4+ Serratia marcescens     2 Days Post-Op (4/13/2023) evacuation of scalp hematoma    JUAN = 15 mL, keep    Continue neuro exams per policy.  Call for decline    CV: AFib with RVR and heart strain and trop bump.  Stabilized    Hold all anticoagulation for 2 weeks.   Appreciate cardiology  Pulm: Chest x-ray cleared  :  Voiding.   FEN: Tolerating carb consistent diet  Endocrine: Glucose improving.  Continue SSI  GI: SHERIF.  +BM  ID:  Infected cranial flap  CSF: no growth to date   PNA.  Resolved   UTI.  Resolved   Continue meropenem per ID.  Appreciate assist  Heme:  DVT prophylaxis with SCDs.  Hold all anticoagulation for 2 weeks.  Pain: Tolerable at present  Dispo: PT/OT    [facility]  Patient agreeable   DC pending drain placement.     Chief complaint:   4-5 days onset of progressively worsening right frontal, temporal, and periorbital edema and associated symptoms to include, but not limited to generalized fatigue, chills, dyspnea, intermittent nausea without vomiting, and states he's felt feverish.    HPI  Subjective  Doing well    Temp:  [97.7 °F (36.5 °C)-98.6 °F (37 °C)] 97.8 °F (36.6 °C)  Heart Rate:  [] 98  Resp:  [16-20] 20  BP: (108-124)/(68-82) 124/68    Output by Drain (mL) 04/13/23 0701 - 04/13/23 1900 04/13/23 1901 - 04/14/23 0700 04/14/23 0701 - 04/14/23 0844 Range Total   Closed/Suction Drain 1 Right Scalp Bulb 10 5  15     Objective:  Vital signs: (most recent): Blood pressure 124/68, pulse 98, temperature 97.8 °F (36.6 °C), temperature source Oral, resp. rate 20, height 177.8 cm (70\"), weight 123 kg (270 lb 4.5 oz), SpO2 98 %.      Neurologic Exam     Mental Status   Oriented to person, place, and time.   Speech: speech is normal   Level of consciousness: " alert    Oriented x3.  Follows commands without prompting showing thumbs up and 2 fingers bilaterally.     Cranial Nerves     CN III, IV, VI   Pupils are equal, round, and reactive to light.  Extraocular motions are normal.     CN V   Facial sensation intact.     CN VII   Facial expression full, symmetric.     Motor Exam   Right arm pronator drift: absent  Left arm pronator drift: absent    Strength   Right deltoid: 5/5  Left deltoid: 5/5  Right biceps: 5/5  Left biceps: 5/5  Right triceps: 5/5  Left triceps: 5/5  Right iliopsoas: 5/5  Left iliopsoas: 5/5  Right quadriceps: 5/5  Left quadriceps: 5/5  Right gastroc: 5/5  Left gastroc: 5/5  Moves all extremities equal and symmetric     Sensory Exam   Light touch normal.     Drains: * No LDAs found *    Imaging Results (Last 24 Hours)     ** No results found for the last 24 hours. **        Lab Results (last 24 hours)     Procedure Component Value Units Date/Time    POC Glucose Once [[id number]]  (Normal) Collected: 04/14/23 0804    Specimen: Blood Updated: 04/14/23 0825     Glucose 85 mg/dL      Comment: : [id number] Rm KimMeter ID: [id number]       POC Glucose Once [[id number]]  (Abnormal) Collected: 04/13/23 2137    Specimen: Blood Updated: 04/13/23 2149     Glucose 131 mg/dL      Comment: : laila Andrade BradyMeter ID: [id number]       POC Glucose Once [[id number]]  (Abnormal) Collected: 04/13/23 2104    Specimen: Blood Updated: 04/13/23 2116     Glucose 58 mg/dL      Comment: 15 MIN RECHECKOperator: laila Andrade BradyMeter ID: [id number]       POC Glucose Once [[id number]]  (Abnormal) Collected: 04/13/23 2047    Specimen: Blood Updated: 04/13/23 2101     Glucose 57 mg/dL      Comment: : laila Andrade BradyMeter ID: [id number]       POC Glucose Once [[id number]]  (Normal) Collected: 04/13/23 1536    Specimen: Blood Updated: 04/13/23 1558     Glucose 97 mg/dL      Comment: : [id number] Rm GenticelMeter ID: [id number]       POC Glucose  Once [[id number]]  (Normal) Collected: 04/13/23 1158    Specimen: Blood Updated: 04/13/23 1208     Glucose 88 mg/dL      Comment: : [id number] Rm Wheat ID: [id number]           Rahul Arnold, APRN

## 2023-04-14 NOTE — PLAN OF CARE
Goal Outcome Evaluation:  Plan of Care Reviewed With: patient, caregiver        Progress: no change  Outcome Evaluation: Ntn follow up. Pamelaing reports pt did not eat breakfast or lunch today. CCHO diet. Boost Glucose Control BID. Magic cup ordered with lunch and dinner; however does contain 38 gm carboydrate per serving vs. Boost Glucose Control of 23 gm/serving. RN aware of the increased carbohydrate in magic cup. MVI w/ minerals ordered daily. Encourage oral intake with meals. Cont to follow for plan of care.

## 2023-04-14 NOTE — PROGRESS NOTES
[facility] Medicine Services  INPATIENT PROGRESS NOTE    Patient Name: Elijah Escobar  Date of Admission: 3/31/2023  Today's Date: 23  Length of Stay: 14  Primary Care Physician: Brianna Martinez APRN    Subjective   Chief Complaint: No complaint  HPI     The patient complains of poor appetite. He eats only a few bites and then feels nauseated. Heart rate controlled at 100/min. BP is stable. I will check a digoxin level and start oral Reglan. Cognition remains at baseline.  Preserve occasion has apparently  and the patient will need to be precertified once again for SNF placement.  This cannot be initiated until Monday.    Review of Systems   All pertinent negatives and positives are as above. All other systems have been reviewed and are negative unless otherwise stated.     Objective    Temp:  [97.7 °F (36.5 °C)-98.5 °F (36.9 °C)] 98.5 °F (36.9 °C)  Heart Rate:  [] 100  Resp:  [16-20] 20  BP: (108-132)/(68-80) 132/68  Physical Exam  Constitutional:       Appearance: He is obese. He is bright and interactive appearing.      Comments:  No distress.  No family present.   HENT:      Head: Normocephalic.      Comments: Postop dressing around head removed.  Drain present.  Eyes:      Conjunctiva/sclera: Conjunctivae normal.    Cardiovascular:      Rate and Rhythm: Normal rate. Rhythm irregularly irregular and variable at 100.      Heart sounds: Normal heart sounds.   Pulmonary:      Effort: Pulmonary effort is normal. No respiratory distress.      Breath sounds: No wheezing or rales.   Abdominal:      General: Bowel sounds are normal. There is no distension.      Palpations: Abdomen is soft.      Tenderness: There is no abdominal tenderness.   Musculoskeletal:         General: Swelling (trace) present. No tenderness or deformity. Normal range of motion.   Skin:     General: Skin is warm and dry.      Findings: No rash.   Neurological:      Mental Status: He is  alert and oriented to person, place, and time.       Motor: No abnormal muscle tone.   Psychiatric:         Mood and Affect: Mood normal.         Behavior: Behavior normal.         Thought Content: Thought content normal.        Results Review:  I have reviewed the labs, radiology results, and diagnostic studies.    Laboratory Data:   Results from last 7 days   Lab Units 04/12/23  0939 04/09/23  0317   WBC 10*3/mm3 10.00 14.47*   HEMOGLOBIN g/dL 11.1* 10.8*   HEMATOCRIT % 36.1* 33.3*   PLATELETS 10*3/mm3 209 229        Results from last 7 days   Lab Units 04/12/23  0939 04/09/23  0317   SODIUM mmol/L 142 140   POTASSIUM mmol/L 4.1 3.8   CHLORIDE mmol/L 105 105   CO2 mmol/L 27.0 28.0   BUN mg/dL 16 24*   CREATININE mg/dL 0.94 0.96   CALCIUM mg/dL 8.0* 7.7*   BILIRUBIN mg/dL 0.4  --    ALK PHOS U/L 87  --    ALT (SGPT) U/L 29  --    AST (SGOT) U/L 25  --    GLUCOSE mg/dL 135* 152*       Culture Data:   No results found for: BLOODCX, URINECX, WOUNDCX, MRSACX, RESPCX, STOOLCX    Radiology Data:   Imaging Results (Last 24 Hours)     ** No results found for the last 24 hours. **          I have reviewed the patient's current medications.     Assessment/Plan   Assessment  Active Hospital Problems    Diagnosis    • **Swelling of scalp    • COPD (chronic obstructive pulmonary disease)    • Obesity (BMI 30-39.9)    • Tobacco abuse, in remission    • Acute systolic heart failure    • ADDIE (obstructive sleep apnea)    • Type 2 myocardial infarction due to arrhythmia    • Acute pulmonary edema due to A-fib RVR    • Acute respiratory failure with hypoxia    • Intracranial epidural hematoma    • Post-operative infection    • Paroxysmal atrial fibrillation with rapid ventricular response    • Type 2 diabetes mellitus with hyperglycemia and peripheral neuropathy, with long-term current use of insulin (HCC)    • Coronary artery disease    • Meningioma s/p craniotomy        Treatment Plan  Reglan 10 mg p.o. before meals and at  bedtime  Continue Cardizem and digoxin for rate control  Check digoxin level    Medical Decision Making   Number and Complexity of problems:   1) respiratory failure with hypoxia secondary to pulmonary edema which is secondary to A-fib RVR, acute, moderate complexity  2) PAF with RVR, acute, moderate complexity  3) meningioma versus intracranial infection, acute, high complexity  4) type 2 diabetes, chronic, moderate complexity     Differential Diagnosis: Pulmonary embolus, LV dysfunction     Conditions and Status        Condition is unchanged.     [facility] Data  External documents reviewed: Care everywhere documentation  Cardiac tracing (EKG, telemetry) interpretation: See HPI  Radiology interpretation: See HPI  Labs reviewed: See HPI  Any tests that were considered but not ordered: None     Decision rules/scores evaluated (example [id number], [place], etc): [id number] score of 5 with a 7.2% risk of stroke annually     Discussed with: The patient and nursing     Care Planning  Shared decision making: The patient  Code status and discussions: Full code     Disposition  Social Determinants of Health that impact treatment or disposition: None  I expect the patient to be discharged by the primary service.    Electronically signed by Ramin Singh DO, 04/14/23, 16:16 CDT.'

## 2023-04-14 NOTE — PLAN OF CARE
"Goal Outcome Evaluation:  Plan of Care Reviewed With: patient        Progress: improving  Outcome Evaluation: Pt in bed c/o feeling like he is \"breathing heavy\". O2 sat % on 3.5L and -120s. Mod Ind to EOB w/ HOB elevated. Sat EOB ~15mins to see if feeling subsides. Performed AROM BLE x15. Stood SBA w/ RW. Amb 100'x2 w/ RW and CGA. Pt states he feels good walking. Once back to room and seated pt started to feel the \"heavy breathing\" again. Noted HR was 145-149. Notififed RN. After a few mins pts HR was ranging 100-115s.  "

## 2023-04-14 NOTE — NURSING NOTE
Pt blood sugar was 57 and recheck was 61. Gave pt two cups of orange juice and rechecked 15 minutes later and blood sugar was 58. Talked to Dr. Hector and gave amp of D50. Rechecked 10 minutes later and blood sugar was 130. Dr. Hector changed Levemir order to 10 units.

## 2023-04-14 NOTE — CASE MANAGEMENT/SOCIAL WORK
Continued Stay Note  [facility]     Patient Name: Elijah Escobar  MRN: [id number]  Today's Date: 4/14/2023    Admit Date: 3/31/2023    Plan: [facility] pending insurance approval.   Discharge Plan     Row Name 04/14/23 1522       Plan    Plan Comments Updated admissions at [facility]. Pt will need a new precert prior to dc. Precert will be started on Monday.               Discharge Codes    No documentation.               Expected Discharge Date and Time     Expected Discharge Date Expected Discharge Time    Apr 17, 2023             JOO Quinteros

## 2023-04-14 NOTE — THERAPY TREATMENT NOTE
Acute Care - Physical Therapy Treatment Note   [facility]     Patient Name: Elijah Escobar  : 1955  MRN: [id number]  Today's Date: 2023      Visit Dx:     ICD-10-CM ICD-9-CM   1. Intracranial epidural hematoma  S06.4X0A 852.40   2. Impaired mobility  Z74.09 799.89   3. S/P craniotomy  Z98.890 V45.89   4. Infection of deep incisional surgical site after procedure, initial encounter  T81.42XA 998.59     Patient Active Problem List   Diagnosis   • Meningioma s/p craniotomy   • Hypertension   • GERD (gastroesophageal reflux disease)   • Coronary artery disease   • Class 2 severe obesity due to excess calories with serious comorbidity and body mass index (BMI) of 38.0 to 38.9 in adult   • Type 2 diabetes mellitus with hyperglycemia and peripheral neuropathy, with long-term current use of insulin (HCC)   • Leukocytosis   • Other specified anemias   • Paroxysmal atrial fibrillation with rapid ventricular response   • Post-operative infection   • Smoker   • History of cranioplasty   • Facial edema   • Swelling of scalp   • Acute pulmonary edema due to A-fib RVR   • Acute respiratory failure with hypoxia   • Type 2 myocardial infarction due to arrhythmia   • COPD (chronic obstructive pulmonary disease)   • Obesity (BMI 30-39.9)   • Tobacco abuse, in remission   • Acute systolic heart failure   • ADDIE (obstructive sleep apnea)   • Intracranial epidural hematoma     Past Medical History:   Diagnosis Date   • Brain tumor    • Coronary artery disease    • COVID-19 vaccine series completed     MODERNA X 3; LAST DOSE 3/2022   • Diabetes    • Erectile dysfunction    • GERD (gastroesophageal reflux disease)    • Hypercholesteremia    • Hypertension    • Seizure      Past Surgical History:   Procedure Laterality Date   • BALLOON ANGIOPLASTY, ARTERY Right    • COLONOSCOPY     • CRANIECTOMY Right 2022    Procedure: CRANIECTOMY WITH INCISIONAL WASHOUT;  Surgeon: Felipe Banerjee MD;  Location: [facility];  Service:  Neurosurgery;  Laterality: Right;   • CRANIOPLASTY Right 10/4/2022    Procedure: CRANIOPLASTY right with Lumbar drain;  Surgeon: Flaco Portillo MD;  Location:  [facility];  Service: Neurosurgery;  Laterality: Right;   • CRANIOPLASTY N/A 4/4/2023    Procedure: CRANIOPLASTY, removal, right, horseshoe;  Surgeon: Flaco Portillo MD;  Location:  [facility];  Service: Neurosurgery;  Laterality: N/A;   • CRANIOTOMY Right 4/12/2023    Procedure: CRANIOTOMY; evacuation of right hematoma;  Surgeon: Flaco Portillo MD;  Location:  [facility];  Service: Neurosurgery;  Laterality: Right;   • CRANIOTOMY FOR TUMOR Right 6/16/2022    Procedure: CRANIOTOMY FOR TUMOR STERIOTACTIC WITH BRAIN LAB, right;  Surgeon: Flaco Portillo MD;  Location:  [facility];  Service: Neurosurgery;  Laterality: Right;     PT Assessment (last 12 hours)     PT Evaluation and Treatment     Row Name 04/14/23 0903          Physical Therapy Time and Intention    Subjective Information complains of  Breathing hard  -TB     Document Type therapy note (daily note)  -TB     Mode of Treatment physical therapy  -TB     Patient Effort good  -TB     Row Name 04/14/23 0903          General Information    Existing Precautions/Restrictions fall;oxygen therapy device and L/min  Helmet when OOB, 4L O2, [place] drain  -TB     Row Name 04/14/23 0903          Pain    Pretreatment Pain Rating 0/10 - no pain  -TB     Posttreatment Pain Rating 0/10 - no pain  -TB     Row Name 04/14/23 0903          Bed Mobility    Scooting/Bridging [place] (Bed Mobility) modified independence  -TB     Supine-Sit [place] (Bed Mobility) modified independence  -TB     Assistive Device (Bed Mobility) head of bed elevated  -TB     Row Name 04/14/23 0903          Transfers    Transfers sit-stand transfer;stand-sit transfer  -TB     Row Name 04/14/23 0903          Sit-Stand Transfer    Sit-Stand [place] (Transfers) standby assist  -TB     Assistive Device (Sit-Stand  Transfers) walker, front-wheeled  -TB     Row Name 04/14/23 0903          Stand-Sit Transfer    Stand-Sit [place] (Transfers) standby assist  -TB     Assistive Device (Stand-Sit Transfers) walker, front-wheeled  -TB     Row Name 04/14/23 0903          Gait/Stairs (Locomotion)    [place] Level (Gait) contact guard;verbal cues  -TB     Assistive Device (Gait) walker, front-wheeled  -TB     Distance in Feet (Gait) 100'x2  -TB     Pattern (Gait) step-through  -TB     Deviations/Abnormal Patterns (Gait) stride length decreased  -TB     Row Name 04/14/23 0903          Balance    Balance Assessment sitting static balance;sitting dynamic balance  -TB     Static Sitting Balance supervision  -TB     Dynamic Sitting Balance supervision  -TB     Position, Sitting Balance sitting edge of bed  -TB     Comment, Balance Sat EOB ~15mins  -TB     Row Name 04/14/23 0903          Motor Skills    Comments, Therapeutic Exercise AROM BLE x15  -TB     Row Name             Wound 04/04/23 1111 Right temporal region Incision    Wound - Properties Group Placement Date: 04/04/23  -CHRISTIANO Placement Time: 1111  -CHRISTIANO Side: Right  -CHRISTIANO Location: temporal region  -CHRISTIANO Primary Wound Type: Incision  -CHRISTIANO    Retired Wound - Properties Group Placement Date: 04/04/23  -CHRISTIANO Placement Time: 1111  -CHRISTIANO Side: Right  -CHRISTIANO Location: temporal region  -CHRISTIANO Primary Wound Type: Incision  -CHRISTIANO    Retired Wound - Properties Group Date first assessed: 04/04/23  -CHRISTIANO Time first assessed: 1111  -CHRISTAINO Side: Right  -CHRISTIANO Location: temporal region  -CHRISTIANO Primary Wound Type: Incision  -CHRISTIANO    Row Name             Wound 04/12/23 1622 Right scalp Incision    Wound - Properties Group Placement Date: 04/12/23  -SAFIA Placement Time: 1622  -SAFIA Side: Right  -SAFIA Location: scalp  -SAFIA Primary Wound Type: Incision  -SAFIA    Retired Wound - Properties Group Placement Date: 04/12/23  -SAFIA Placement Time: 1622  -SAFIA Side: Right  -SAFIA Location: scalp  -SAFIA Primary Wound Type: Incision  -SAFIA    Retired Wound  "- Properties Group Date first assessed: 04/12/23  -SAFIA Time first assessed: 1622  -SAFIA Side: Right  -SAFIA Location: scalp  -SAFIA Primary Wound Type: Incision  -SAFIA    Row Name 04/14/23 0903          Plan of Care Review    Plan of Care Reviewed With patient  -TB     Progress improving  -TB     Outcome Evaluation Pt in bed c/o feeling like he is \"breathing heavy\". O2 sat % on 3.5L and -120s. Mod Ind to EOB w/ HOB elevated. Sat EOB ~15mins to see if feeling subsides. Performed AROM BLE x15. Stood SBA w/ RW. Amb 100'x2 w/ RW and CGA. Pt states he feels good walking. Once back to room and seated pt started to feel the \"heavy breathing\" again. Noted HR was 145-149. Notififed RN. After a few mins pts HR was ranging 100-115s.  -TB     Row Name 04/14/23 0903          Positioning and Restraints    Pre-Treatment Position in bed  -TB     Post Treatment Position chair  -TB     In Chair notified nsg;reclined;sitting;call light within reach;encouraged to call for assist;exit alarm on;legs elevated;heels elevated  -TB           User Key  (r) = Recorded By, (t) = Taken By, (c) = Cosigned By    Initials Name Provider Type    SAFIA Kierra Irby, RN Registered Nurse    Ricky George, RN Registered Nurse    Tere Berger, PTA Physical Therapist Assistant                Physical Therapy Education     Title: PT OT SLP Therapies (In Progress)     Topic: Physical Therapy (In Progress)     Point: Mobility training (In Progress)     Learning Progress Summary           Patient Acceptance, E, NR by JUAN at 4/11/2023 0809    Comment: Looking ahead during gait    Acceptance, E,D, DU,VU by JUAN at 4/10/2023 1447    Comment: Safety with transfers, please take time, no impulsiveness    Acceptance, E,TB, VU by AMY at 4/6/2023 1350    Acceptance, E, VU by SB at 4/5/2023 1605    Comment: helmet fit, safety, POC    Acceptance, E, VU by CHRISTIANO at 4/3/2023 1353    Comment: gait, safety,    Acceptance, E, VU by SB at 4/1/2023 1405    Comment: " "pt edu on POC, benefits of act, PLB and d/c plans   Family Acceptance, E,TB, VU by CM at 4/6/2023 1350                   Point: Home exercise program (Done)     Learning Progress Summary           Patient Acceptance, E,TB, VU by CM at 4/6/2023 1350   Family Acceptance, E,TB, VU by CM at 4/6/2023 1350                   Point: Body mechanics (Done)     Learning Progress Summary           Patient Acceptance, E,TB, VU by CM at 4/6/2023 1350   Family Acceptance, E,TB, VU by CM at 4/6/2023 1350                   Point: Precautions (Done)     Learning Progress Summary           Patient Acceptance, E,TB, VU by CM at 4/6/2023 1350    Acceptance, E, VU by SB at 4/5/2023 1605    Comment: helmet fit, safety, POC    Acceptance, E, VU by SB at 4/1/2023 1405    Comment: pt edu on POC, benefits of act, PLB and d/c plans   Family Acceptance, E,TB, VU by CM at 4/6/2023 1350                               User Key     Initials Effective Dates Name Provider Type Discipline    JUAN 02/03/23 -  Carrie Callaway PTA Physical Therapist Assistant PT    CHRISTIANO 02/03/23 -  Александр Saravia PTA Physical Therapist Assistant PT    CM 03/02/23 -  Dianelys Carlton, RN Registered Nurse Nurse    SB 06/16/21 -  Erika Javier, BELEN DPT Physical Therapist PT              PT Recommendation and Plan     Plan of Care Reviewed With: patient  Progress: improving  Outcome Evaluation: Pt in bed c/o feeling like he is \"breathing heavy\". O2 sat % on 3.5L and -120s. Mod Ind to EOB w/ HOB elevated. Sat EOB ~15mins to see if feeling subsides. Performed AROM BLE x15. Stood SBA w/ RW. Amb 100'x2 w/ RW and CGA. Pt states he feels good walking. Once back to room and seated pt started to feel the \"heavy breathing\" again. Noted HR was 145-149. Notififed RN. After a few mins pts HR was ranging 100-115s.   Outcome Measures     Row Name 04/14/23 0903 04/13/23 1300          How much help from another person do you currently need...    Turning from your back to your " side while in flat bed without using bedrails? 4  -TB 4  -AH     Moving from lying on back to sitting on the side of a flat bed without bedrails? 4  -TB 4  -AH     Moving to and from a bed to a chair (including a wheelchair)? 4  -TB 3  -AH     Standing up from a chair using your arms (e.g., wheelchair, bedside chair)? 4  -TB 3  -AH     Climbing 3-5 steps with a railing? 3  -TB 3  -AH     To walk in hospital room? 3  -TB 3  -AH     AM-PAC 6 Clicks Score (PT) 22  -TB 20  -AH        Functional Assessment    Outcome Measure Options AM-PAC 6 Clicks Basic Mobility (PT)  -TB AM-PAC 6 Clicks Basic Mobility (PT)  -AH           User Key  (r) = Recorded By, (t) = Taken By, (c) = Cosigned By    Initials Name Provider Type     Mary Alice Benton, KYLAH Physical Therapist Assistant    Tere Berger PTA Physical Therapist Assistant                 Time Calculation:    PT Charges     Row Name 04/14/23 1029             Time Calculation    Start Time 0903  -TB      Stop Time 0941  -TB      Time Calculation (min) 38 min  -TB      PT Received On 04/14/23  -TB         Time Calculation- PT    Total Timed Code Minutes- PT 38 minute(s)  -TB            User Key  (r) = Recorded By, (t) = Taken By, (c) = Cosigned By    Initials Name Provider Type    Tere Berger PTA Physical Therapist Assistant              Therapy Charges for Today     Code Description Service Date Service Provider Modifiers Qty    40347506239 HC GAIT TRAINING EA 15 MIN 4/14/2023 Tere Miller PTA GP 2    [id number] HC PT THER PROC EA 15 MIN 4/14/2023 Tere Miller PTA GP 1          PT G-Codes  Outcome Measure Options: AM-PAC 6 Clicks Basic Mobility (PT)  AM-PAC 6 Clicks Score (PT): 22  AM-PAC 6 Clicks Score (OT): 20    Tere Miller PTA  4/14/2023

## 2023-04-14 NOTE — PLAN OF CARE
Goal Outcome Evaluation:  Plan of Care Reviewed With: patient        Progress: no change  Outcome Evaluation: Pt A&Ox4. Up x1 to [employer] to head CDI. JUAN drain with no output. IV abx cont per orders. C/o of pain w/ PRN pain meds given. C/o of heartburn w/ PRNs given. PPP. Denies n/t. Bed alarm on. Call light in reach. Safety maintained. Will cont to [facility].

## 2023-04-15 NOTE — PROGRESS NOTES
[facility] Medicine Services  INPATIENT PROGRESS NOTE    Patient Name: Elijah Escobar  Date of Admission: 3/31/2023  Today's Date: 04/15/23  Length of Stay: 15  Primary Care Physician: Brianna Martinez APRN    Subjective   Chief Complaint: Early satiety  HPI     The patient continues to complain of poor appetite eating only a few bites with each meal and becoming full or nauseated.  Sucralfate will be started in combination with Reglan and PPI.  Heart rate remains controlled at 89.  Digoxin level was drawn yesterday and remains pending.  Cognition remains at baseline.  Precertification at [facility] has  and the patient will require repeat precertification prior to placement.  This cannot be started until Monday.    Review of Systems   All pertinent negatives and positives are as above. All other systems have been reviewed and are negative unless otherwise stated.     Objective    Temp:  [97.6 °F (36.4 °C)-98.2 °F (36.8 °C)] 98.1 °F (36.7 °C)  Heart Rate:  [] 89  Resp:  [16-20] 16  BP: (121-132)/(60-95) 132/60  Physical Exam  Constitutional:       Appearance: He is obese. He is bright and interactive appearing.      Comments:  No distress.  No family present.   HENT:      Head: Normocephalic.      Comments: Postop dressing around head removed.  Drain present.  Eyes:      Conjunctiva/sclera: Conjunctivae normal.    Cardiovascular:      Rate and Rhythm: Normal rate. Rhythm irregularly irregular and variable at 100.      Heart sounds: Normal heart sounds.   Pulmonary:      Effort: Pulmonary effort is normal. No respiratory distress.      Breath sounds: No wheezing or rales.   Abdominal:      General: Bowel sounds are normal. There is no distension.      Palpations: Abdomen is soft.      Tenderness: There is no abdominal tenderness.   Musculoskeletal:         General: Swelling (trace) present. No tenderness or deformity. Normal range of motion.   Skin:     General: Skin is  warm and dry.      Findings: No rash.   Neurological:      Mental Status: He is alert and oriented to person, place, and time.       Motor: No abnormal muscle tone.   Psychiatric:         Mood and Affect: Mood normal.         Behavior: Behavior normal.         Thought Content: Thought content normal.           Results Review:  I have reviewed the labs, radiology results, and diagnostic studies.    Laboratory Data:   Results from last 7 days   Lab Units 04/12/23  0939 04/09/23  0317   WBC 10*3/mm3 10.00 14.47*   HEMOGLOBIN g/dL 11.1* 10.8*   HEMATOCRIT % 36.1* 33.3*   PLATELETS 10*3/mm3 209 229        Results from last 7 days   Lab Units 04/12/23  0939 04/09/23  0317   SODIUM mmol/L 142 140   POTASSIUM mmol/L 4.1 3.8   CHLORIDE mmol/L 105 105   CO2 mmol/L 27.0 28.0   BUN mg/dL 16 24*   CREATININE mg/dL 0.94 0.96   CALCIUM mg/dL 8.0* 7.7*   BILIRUBIN mg/dL 0.4  --    ALK PHOS U/L 87  --    ALT (SGPT) U/L 29  --    AST (SGOT) U/L 25  --    GLUCOSE mg/dL 135* 152*       Culture Data:   No results found for: BLOODCX, URINECX, WOUNDCX, MRSACX, RESPCX, STOOLCX    Radiology Data:   Imaging Results (Last 24 Hours)     ** No results found for the last 24 hours. **          I have reviewed the patient's current medications.     Assessment/Plan   Assessment  Active Hospital Problems    Diagnosis    • **Swelling of scalp    • COPD (chronic obstructive pulmonary disease)    • Obesity (BMI 30-39.9)    • Tobacco abuse, in remission    • Acute systolic heart failure    • ADDIE (obstructive sleep apnea)    • Type 2 myocardial infarction due to arrhythmia    • Acute pulmonary edema due to A-fib RVR    • Acute respiratory failure with hypoxia    • Intracranial epidural hematoma    • Post-operative infection    • Paroxysmal atrial fibrillation with rapid ventricular response    • Type 2 diabetes mellitus with hyperglycemia and peripheral neuropathy, with long-term current use of insulin (HCC)    • Coronary artery disease    • Meningioma  s/p craniotomy        Treatment Plan  Sucralfate 1 g p.o. AC and ileus  Continue Cardizem and digoxin for rate control  Digoxin level is pending.    Medical Decision Making   Number and Complexity of problems:   1) respiratory failure with hypoxia secondary to pulmonary edema which is secondary to A-fib RVR, acute, moderate complexity  2) PAF with RVR, acute, moderate complexity  3) meningioma versus intracranial infection, acute, high complexity  4) type 2 diabetes, chronic, moderate complexity     Differential Diagnosis: Pulmonary embolus, LV dysfunction     Conditions and Status        Condition is unchanged.     [facility] Data  External documents reviewed: Care everywhere documentation  Cardiac tracing (EKG, telemetry) interpretation: See HPI  Radiology interpretation: See HPI  Labs reviewed: See HPI  Any tests that were considered but not ordered: None     Decision rules/scores evaluated (example [id number], Wells, etc): [id number] score of 5 with a 7.2% risk of stroke annually     Discussed with: The patient and nursing     Care Planning  Shared decision making: The patient  Code status and discussions: Full code     Disposition  Social Determinants of Health that impact treatment or disposition: None  I expect the patient to be discharged by the primary service.    Electronically signed by Ramin Singh DO, 04/15/23, 18:28 CDT.

## 2023-04-15 NOTE — THERAPY TREATMENT NOTE
Acute Care - Physical Therapy Treatment Note   [place]     Patient Name: Elijah Escobar  : 1955  MRN: [id number]  Today's Date: 4/15/2023      Visit Dx:     ICD-10-CM ICD-9-CM   1. Intracranial epidural hematoma  S06.4X0A 852.40   2. Impaired mobility  Z74.09 799.89   3. S/P craniotomy  Z98.890 V45.89   4. Infection of deep incisional surgical site after procedure, initial encounter  T81.42XA 998.59     Patient Active Problem List   Diagnosis   • Meningioma s/p craniotomy   • Hypertension   • GERD (gastroesophageal reflux disease)   • Coronary artery disease   • Class 2 severe obesity due to excess calories with serious comorbidity and body mass index (BMI) of 38.0 to 38.9 in adult   • Type 2 diabetes mellitus with hyperglycemia and peripheral neuropathy, with long-term current use of insulin (HCC)   • Leukocytosis   • Other specified anemias   • Paroxysmal atrial fibrillation with rapid ventricular response   • Post-operative infection   • Smoker   • History of cranioplasty   • Facial edema   • Swelling of scalp   • Acute pulmonary edema due to A-fib RVR   • Acute respiratory failure with hypoxia   • Type 2 myocardial infarction due to arrhythmia   • COPD (chronic obstructive pulmonary disease)   • Obesity (BMI 30-39.9)   • Tobacco abuse, in remission   • Acute systolic heart failure   • ADDIE (obstructive sleep apnea)   • Intracranial epidural hematoma     Past Medical History:   Diagnosis Date   • Brain tumor    • Coronary artery disease    • COVID-19 vaccine series completed     MODERNA X 3; LAST DOSE 3/2022   • Diabetes    • Erectile dysfunction    • GERD (gastroesophageal reflux disease)    • Hypercholesteremia    • Hypertension    • Seizure      Past Surgical History:   Procedure Laterality Date   • BALLOON ANGIOPLASTY, ARTERY Right    • COLONOSCOPY     • CRANIECTOMY Right 2022    Procedure: CRANIECTOMY WITH INCISIONAL WASHOUT;  Surgeon: Felipe Banerjee MD;  Location: [facility];  Service:  Neurosurgery;  Laterality: Right;   • CRANIOPLASTY Right 10/4/2022    Procedure: CRANIOPLASTY right with Lumbar drain;  Surgeon: Flaco Portillo MD;  Location:  [facility];  Service: Neurosurgery;  Laterality: Right;   • CRANIOPLASTY N/A 4/4/2023    Procedure: CRANIOPLASTY, removal, right, horseshoe;  Surgeon: Flaco Portillo MD;  Location:  [facility];  Service: Neurosurgery;  Laterality: N/A;   • CRANIOTOMY Right 4/12/2023    Procedure: CRANIOTOMY; evacuation of right hematoma;  Surgeon: Flaco Portillo MD;  Location:  [facility];  Service: Neurosurgery;  Laterality: Right;   • CRANIOTOMY FOR TUMOR Right 6/16/2022    Procedure: CRANIOTOMY FOR TUMOR STERIOTACTIC WITH BRAIN LAB, right;  Surgeon: Flaco Portillo MD;  Location:  [facility];  Service: Neurosurgery;  Laterality: Right;     PT Assessment (last 12 hours)     PT Evaluation and Treatment     Row Name 04/15/23 1320 04/15/23 0720       Physical Therapy Time and Intention    Subjective Information no complaints  -TB no complaints  -TB    Document Type therapy note (daily note)  -TB therapy note (daily note)  -TB    Mode of Treatment physical therapy  -TB physical therapy  -TB    Patient Effort -- good  -TB    Row Name 04/15/23 1320 04/15/23 0720       General Information    Existing Precautions/Restrictions fall;oxygen therapy device and L/min  Helmet OOB, 4L  -TB fall;oxygen therapy device and L/min  Helmet OOB, JUAN drain, 4L  -TB    Row Name 04/15/23 0720          Pain    Pretreatment Pain Rating 0/10 - no pain  -TB     Posttreatment Pain Rating 0/10 - no pain  -TB     Row Name 04/15/23 1320 04/15/23 0720       Bed Mobility    Bed Mobility scooting/bridging;supine-sit;sit-supine  -TB scooting/bridging;supine-sit  -TB    Scooting/Bridging [place] (Bed Mobility) modified independence  -TB modified independence  -TB    Supine-Sit [place] (Bed Mobility) modified independence  -TB modified independence  -TB    Sit-Supine  [place] (Bed Mobility) modified independence  -TB --    Assistive Device (Bed Mobility) head of bed elevated  -TB head of bed elevated  -TB    Row Name 04/15/23 1320 04/15/23 0720       Transfers    Transfers sit-stand transfer;stand-sit transfer  -TB sit-stand transfer;stand-sit transfer  -TB    Row Name 04/15/23 1320 04/15/23 0720       Sit-Stand Transfer    Sit-Stand [place] (Transfers) standby assist  -TB standby assist  -TB    Assistive Device (Sit-Stand Transfers) walker, front-wheeled  -TB walker, front-wheeled  -TB    Row Name 04/15/23 1320 04/15/23 0720       Stand-Sit Transfer    Stand-Sit [place] (Transfers) standby assist  -TB standby assist  -TB    Assistive Device (Stand-Sit Transfers) walker, front-wheeled  -TB walker, front-wheeled  -TB    Row Name 04/15/23 1320 04/15/23 0720       Gait/Stairs (Locomotion)    [place] Level (Gait) contact guard;verbal cues  -TB contact guard;verbal cues  -TB    Assistive Device (Gait) cane, straight  -TB walker, front-wheeled  -TB    Distance in Feet (Gait) 100'x4  -'x4  -TB    Pattern (Gait) step-through  -TB step-through  -TB    Deviations/Abnormal Patterns (Gait) stride length decreased  -TB stride length decreased  -TB    Bilateral Gait Deviations heel strike decreased  -TB heel strike decreased  -TB    Row Name 04/15/23 0720          Balance    Balance Assessment sitting static balance;sitting dynamic balance;standing static balance;standing dynamic balance  -TB     Static Sitting Balance supervision  -TB     Dynamic Sitting Balance supervision  -TB     Position, Sitting Balance sitting edge of bed  -TB     Static Standing Balance supervision  -TB     Dynamic Standing Balance supervision  -TB     Position/Device Used, Standing Balance unsupported  -TB     Row Name 04/15/23 0720          Motor Skills    Comments, Therapeutic Exercise AROM BLE x15  -TB     Row Name             Wound 04/04/23 1111 Right temporal region Incision    Wound -  Properties Group Placement Date: 04/04/23  -CHRISTIANO Placement Time: 1111  -CHRISTIANO Side: Right  -CHRISTIANO Location: temporal region  -CHRISTIANO Primary Wound Type: Incision  -CHRISTIANO    Retired Wound - Properties Group Placement Date: 04/04/23  -CHRISTIANO Placement Time: 1111  -CHRISTIANO Side: Right  -CHRISTIANO Location: temporal region  -CHRISTIANO Primary Wound Type: Incision  -CHRISTIANO    Retired Wound - Properties Group Date first assessed: 04/04/23  -CHRISTIANO Time first assessed: 1111  -CHRISTIANO Side: Right  -CHRISTIANO Location: temporal region  -CHRISTIANO Primary Wound Type: Incision  -CHRISTIANO    Row Name             Wound 04/12/23 1622 Right scalp Incision    Wound - Properties Group Placement Date: 04/12/23  -SAFIA Placement Time: 1622 -SAFIA Side: Right  -SAFIA Location: scalp  -SAFIA Primary Wound Type: Incision  -SAFIA    Retired Wound - Properties Group Placement Date: 04/12/23  -SAFIA Placement Time: 1622 -SAFIA Side: Right  -SAFIA Location: scalp  -SAFIA Primary Wound Type: Incision  -SAFIA    Retired Wound - Properties Group Date first assessed: 04/12/23  -SAFIA Time first assessed: 1622 -SAFIA Side: Right  -SAFIA Location: scalp  -SAFIA Primary Wound Type: Incision  -SAFIA    Row Name 04/15/23 0720          Plan of Care Review    Plan of Care Reviewed With patient  -TB     Progress improving  -TB     Outcome Evaluation Pt agreeable to tx. Bed mob Mod Ind to EOB w/ HOB elevated. Seated AROM BLE x15. Stood and amb to the bathroom and performed tasks w/ supervision. No LOB. Amb 100' x4 w/ RW and CGA. No LOB or difficulty. Pt amb on 4L. Pt c/o increased fatigue and heartbrun once done w/ activity. RN aware.  -TB     Row Name 04/15/23 1320 04/15/23 0720       Positioning and Restraints    Pre-Treatment Position in bed  -TB in bed  -TB    Post Treatment Position bed  -TB chair  -TB    In Bed fowlers;call light within reach;encouraged to call for assist;exit alarm on;side rails up x2  -TB --    In Chair -- notified nsg;reclined;sitting;call light within reach;encouraged to call for assist;exit alarm on;legs elevated;heels elevated  -TB           User Key  (r) = Recorded By, (t) = Taken By, (c) = Cosigned By    Initials Name Provider Type    Kierra Conroy, RN Registered Nurse    Ricky George RN Registered Nurse    Tere Berger PTA Physical Therapist Assistant                Physical Therapy Education     Title: PT OT SLP Therapies (In Progress)     Topic: Physical Therapy (In Progress)     Point: Mobility training (In Progress)     Learning Progress Summary           Patient Acceptance, E, NR by JUAN at 4/11/2023 0809    Comment: Looking ahead during gait    Acceptance, E,D, DU,VU by JUAN at 4/10/2023 1447    Comment: Safety with transfers, please take time, no impulsiveness    Acceptance, E,TB, VU by CM at 4/6/2023 1350    Acceptance, E, VU by SB at 4/5/2023 1605    Comment: helmet fit, safety, POC    Acceptance, E, VU by CHRISTIANO at 4/3/2023 1353    Comment: gait, safety,    Acceptance, E, VU by SB at 4/1/2023 1405    Comment: pt edu on POC, benefits of act, PLB and d/c plans   Family Acceptance, E,TB, VU by CM at 4/6/2023 1350                   Point: Home exercise program (Done)     Learning Progress Summary           Patient Acceptance, E,TB, VU by CM at 4/6/2023 1350   Family Acceptance, E,TB, VU by CM at 4/6/2023 1350                   Point: Body mechanics (Done)     Learning Progress Summary           Patient Acceptance, E,TB, VU by CM at 4/6/2023 1350   Family Acceptance, E,TB, VU by CM at 4/6/2023 1350                   Point: Precautions (Done)     Learning Progress Summary           Patient Acceptance, E,TB, VU by CM at 4/6/2023 1350    Acceptance, E, VU by SB at 4/5/2023 1605    Comment: helmet fit, safety, POC    Acceptance, E, VU by SB at 4/1/2023 1405    Comment: pt edu on POC, benefits of act, PLB and d/c plans   Family Acceptance, E,TB, VU by CM at 4/6/2023 1350                               User Key     Initials Effective Dates Name Provider Type Discipline     02/03/23 -  Carrie Callaway PTA Physical Therapist  Assistant PT    CHRISTIANO 02/03/23 -  Александр Saravia PTA Physical Therapist Assistant PT    CM 03/02/23 -  Dianelys Carlton, RN Registered Nurse Nurse    SB 06/16/21 -  Erika Javier, PT DPT Physical Therapist PT              PT Recommendation and Plan     Plan of Care Reviewed With: patient  Progress: improving  Outcome Evaluation: Pt agreeable to tx. Bed mob Mod Ind to EOB w/ HOB elevated. Seated AROM BLE x15. Stood and amb to the bathroom and performed tasks w/ supervision. No LOB. Amb 100' x4 w/ RW and CGA. No LOB or difficulty. Pt amb on 4L. Pt c/o increased fatigue and heartbrun once done w/ activity. RN aware.   Outcome Measures     Row Name 04/15/23 0720 04/14/23 0903 04/13/23 1300       How much help from another person do you currently need...    Turning from your back to your side while in flat bed without using bedrails? 4  -TB 4  -TB 4  -AH    Moving from lying on back to sitting on the side of a flat bed without bedrails? 4  -TB 4  -TB 4  -AH    Moving to and from a bed to a chair (including a wheelchair)? 4  -TB 4  -TB 3  -AH    Standing up from a chair using your arms (e.g., wheelchair, bedside chair)? 4  -TB 4  -TB 3  -AH    Climbing 3-5 steps with a railing? 3  -TB 3  -TB 3  -AH    To walk in hospital room? 3  -TB 3  -TB 3  -AH    AM-PAC 6 Clicks Score (PT) 22  -TB 22  -TB 20  -AH       Functional Assessment    Outcome Measure Options AM-PAC 6 Clicks Basic Mobility (PT)  -TB AM-PAC 6 Clicks Basic Mobility (PT)  -TB AM-PAC 6 Clicks Basic Mobility (PT)  -AH          User Key  (r) = Recorded By, (t) = Taken By, (c) = Cosigned By    Initials Name Provider Type     Mary Alice Benton, PTA Physical Therapist Assistant    Tere Berger, PTA Physical Therapist Assistant                 Time Calculation:    PT Charges     Row Name 04/15/23 1505 04/15/23 0756          Time Calculation    Start Time 1320  -TB 0720  -TB     Stop Time 1349  -TB 0746  -TB     Time Calculation (min) 29 min  -TB 26 min   -TB     PT Received On 04/15/23  -TB 04/15/23  -TB        Time Calculation- PT    Total Timed Code Minutes- PT 29 minute(s)  -TB 26 minute(s)  -TB           User Key  (r) = Recorded By, (t) = Taken By, (c) = Cosigned By    Initials Name Provider Type    TB Tere Miller, PTA Physical Therapist Assistant              Therapy Charges for Today     Code Description Service Date Service Provider Modifiers Qty    55589131411 HC GAIT TRAINING EA 15 MIN 4/14/2023 Tere Miller, PTA GP 2    33168216958 HC PT THER PROC EA 15 MIN 4/14/2023 Tere Miller, PTA GP 1    93714193956 HC GAIT TRAINING EA 15 MIN 4/14/2023 Tere Miller, PTA GP 2    30217208303 HC GAIT TRAINING EA 15 MIN 4/15/2023 Tere Miller, PTA GP 2    57204873523 HC GAIT TRAINING EA 15 MIN 4/15/2023 Tere Miller, PTA GP 2          PT G-Codes  Outcome Measure Options: AM-PAC 6 Clicks Basic Mobility (PT)  AM-PAC 6 Clicks Score (PT): 22  AM-PAC 6 Clicks Score (OT): 20    Tere Miller PTA  4/15/2023

## 2023-04-15 NOTE — PLAN OF CARE
Goal Outcome Evaluation:  Plan of Care Reviewed With: patient        Progress: improving  Outcome Evaluation: Pt agreeable to tx. Bed mob Mod Ind to EOB w/ HOB elevated. Seated AROM BLE x15. Stood and amb to the bathroom and performed tasks w/ supervision. No LOB. Amb 100' x4 w/ RW and CGA. No LOB or difficulty. Pt amb on 4L. Pt c/o increased fatigue and heartbrun once done w/ activity. RN aware.

## 2023-04-15 NOTE — THERAPY TREATMENT NOTE
Acute Care - Physical Therapy Treatment Note   [place]     Patient Name: Elijah Escobar  : 1955  MRN: [id number]  Today's Date: 4/15/2023      Visit Dx:     ICD-10-CM ICD-9-CM   1. Intracranial epidural hematoma  S06.4X0A 852.40   2. Impaired mobility  Z74.09 799.89   3. S/P craniotomy  Z98.890 V45.89   4. Infection of deep incisional surgical site after procedure, initial encounter  T81.42XA 998.59     Patient Active Problem List   Diagnosis   • Meningioma s/p craniotomy   • Hypertension   • GERD (gastroesophageal reflux disease)   • Coronary artery disease   • Class 2 severe obesity due to excess calories with serious comorbidity and body mass index (BMI) of 38.0 to 38.9 in adult   • Type 2 diabetes mellitus with hyperglycemia and peripheral neuropathy, with long-term current use of insulin (HCC)   • Leukocytosis   • Other specified anemias   • Paroxysmal atrial fibrillation with rapid ventricular response   • Post-operative infection   • Smoker   • History of cranioplasty   • Facial edema   • Swelling of scalp   • Acute pulmonary edema due to A-fib RVR   • Acute respiratory failure with hypoxia   • Type 2 myocardial infarction due to arrhythmia   • COPD (chronic obstructive pulmonary disease)   • Obesity (BMI 30-39.9)   • Tobacco abuse, in remission   • Acute systolic heart failure   • ADDIE (obstructive sleep apnea)   • Intracranial epidural hematoma     Past Medical History:   Diagnosis Date   • Brain tumor    • Coronary artery disease    • COVID-19 vaccine series completed     MODERNA X 3; LAST DOSE 3/2022   • Diabetes    • Erectile dysfunction    • GERD (gastroesophageal reflux disease)    • Hypercholesteremia    • Hypertension    • Seizure      Past Surgical History:   Procedure Laterality Date   • BALLOON ANGIOPLASTY, ARTERY Right    • COLONOSCOPY     • CRANIECTOMY Right 2022    Procedure: CRANIECTOMY WITH INCISIONAL WASHOUT;  Surgeon: Felipe Banerjee MD;  Location: [facility];  Service:  Neurosurgery;  Laterality: Right;   • CRANIOPLASTY Right 10/4/2022    Procedure: CRANIOPLASTY right with Lumbar drain;  Surgeon: Flaco Portillo MD;  Location:  [facility];  Service: Neurosurgery;  Laterality: Right;   • CRANIOPLASTY N/A 4/4/2023    Procedure: CRANIOPLASTY, removal, right, horseshoe;  Surgeon: Flaco Portillo MD;  Location:  [facility];  Service: Neurosurgery;  Laterality: N/A;   • CRANIOTOMY Right 4/12/2023    Procedure: CRANIOTOMY; evacuation of right hematoma;  Surgeon: Flaco Portillo MD;  Location:  [facility];  Service: Neurosurgery;  Laterality: Right;   • CRANIOTOMY FOR TUMOR Right 6/16/2022    Procedure: CRANIOTOMY FOR TUMOR STERIOTACTIC WITH BRAIN LAB, right;  Surgeon: Flaco Portillo MD;  Location:  [facility];  Service: Neurosurgery;  Laterality: Right;     PT Assessment (last 12 hours)     PT Evaluation and Treatment     Row Name 04/15/23 0720          Physical Therapy Time and Intention    Subjective Information no complaints  -TB     Document Type therapy note (daily note)  -TB     Mode of Treatment physical therapy  -TB     Patient Effort good  -TB     Row Name 04/15/23 0720          General Information    Existing Precautions/Restrictions fall;oxygen therapy device and L/min  Helmet OOB, JUAN drain, 4L  -TB     Row Name 04/15/23 0720          Pain    Pretreatment Pain Rating 0/10 - no pain  -TB     Posttreatment Pain Rating 0/10 - no pain  -TB     Row Name 04/15/23 0720          Bed Mobility    Bed Mobility scooting/bridging;supine-sit  -TB     Scooting/Bridging Larimer (Bed Mobility) modified independence  -TB     Supine-Sit Larimer (Bed Mobility) modified independence  -TB     Assistive Device (Bed Mobility) head of bed elevated  -TB     Row Name 04/15/23 0720          Transfers    Transfers sit-stand transfer;stand-sit transfer  -TB     Row Name 04/15/23 0720          Sit-Stand Transfer    Sit-Stand Larimer (Transfers) standby assist  -TB      Assistive Device (Sit-Stand Transfers) walker, front-wheeled  -TB     Row Name 04/15/23 0720          Stand-Sit Transfer    Stand-Sit Robinson (Transfers) standby assist  -TB     Assistive Device (Stand-Sit Transfers) walker, front-wheeled  -TB     Row Name 04/15/23 0720          Gait/Stairs (Locomotion)    Robinson Level (Gait) contact guard;verbal cues  -TB     Assistive Device (Gait) walker, front-wheeled  -TB     Distance in Feet (Gait) 100'x4  -TB     Pattern (Gait) step-through  -TB     Deviations/Abnormal Patterns (Gait) stride length decreased  -TB     Bilateral Gait Deviations heel strike decreased  -TB     Row Name 04/15/23 0720          Balance    Balance Assessment sitting static balance;sitting dynamic balance;standing static balance;standing dynamic balance  -TB     Static Sitting Balance supervision  -TB     Dynamic Sitting Balance supervision  -TB     Position, Sitting Balance sitting edge of bed  -TB     Static Standing Balance supervision  -TB     Dynamic Standing Balance supervision  -TB     Position/Device Used, Standing Balance unsupported  -TB     Row Name 04/15/23 0720          Motor Skills    Comments, Therapeutic Exercise AROM BLE x15  -TB     Row Name             Wound 04/04/23 1111 Right temporal region Incision    Wound - Properties Group Placement Date: 04/04/23  -CHRISTIANO Placement Time: 1111  -CHRISTIANO Side: Right  -CHRISTIANO Location: temporal region  -CHRISTIANO Primary Wound Type: Incision  -CHRISTIANO    Retired Wound - Properties Group Placement Date: 04/04/23  -CHRISTIANO Placement Time: 1111  -CHRISTIANO Side: Right  -CHRISTIANO Location: temporal region  -CHRISTIANO Primary Wound Type: Incision  -CHRISTIANO    Retired Wound - Properties Group Date first assessed: 04/04/23  -CHRISTIANO Time first assessed: 1111  -CHRISTIANO Side: Right  -CHRISTIANO Location: temporal region  -CHRISTIANO Primary Wound Type: Incision  -CHRISTIANO    Row Name             Wound 04/12/23 1622 Right scalp Incision    Wound - Properties Group Placement Date: 04/12/23  -SAFIA Placement Time: 1622  -SAFIA Side:  Right  -SAFIA Location: scalp  -SAFIA Primary Wound Type: Incision  -SAFIA    Retired Wound - Properties Group Placement Date: 04/12/23  -SAFIA Placement Time: 1622 -SAFIA Side: Right  -SAFIA Location: scalp  -SAFIA Primary Wound Type: Incision  -SAFIA    Retired Wound - Properties Group Date first assessed: 04/12/23  -SAFIA Time first assessed: 1622 -SAFIA Side: Right  -SAFIA Location: scalp  -SAFIA Primary Wound Type: Incision  -SAFIA    Row Name 04/15/23 0720          Plan of Care Review    Plan of Care Reviewed With patient  -TB     Progress improving  -TB     Outcome Evaluation Pt agreeable to tx. Bed mob Mod Ind to EOB w/ HOB elevated. Seated AROM BLE x15. Stood and amb to the bathroom and performed tasks w/ supervision. No LOB. Amb 100' x4 w/ RW and CGA. No LOB or difficulty. Pt amb on 4L. Pt c/o increased fatigue and heartbrun once done w/ activity. RN aware.  -TB     Row Name 04/15/23 0720          Positioning and Restraints    Pre-Treatment Position in bed  -TB     Post Treatment Position chair  -TB     In Chair notified nsg;reclined;sitting;call light within reach;encouraged to call for assist;exit alarm on;legs elevated;heels elevated  -TB           User Key  (r) = Recorded By, (t) = Taken By, (c) = Cosigned By    Initials Name Provider Type    Kierra Conroy, RN Registered Nurse    Ricky George RN Registered Nurse    Tere Berger, PTA Physical Therapist Assistant                Physical Therapy Education     Title: PT OT SLP Therapies (In Progress)     Topic: Physical Therapy (In Progress)     Point: Mobility training (In Progress)     Learning Progress Summary           Patient Acceptance, E, NR by JUAN at 4/11/2023 0809    Comment: Looking ahead during gait    Acceptance, E,D, DU,VU by JUAN at 4/10/2023 1447    Comment: Safety with transfers, please take time, no impulsiveness    Acceptance, E,TB, VU by CM at 4/6/2023 1350    Acceptance, E, VU by SB at 4/5/2023 1605    Comment: helmet fit, safety, POC    Acceptance, E,  VU by CHRISTIANO at 4/3/2023 1353    Comment: gait, safety,    Acceptance, E, VU by SB at 4/1/2023 1405    Comment: pt edu on POC, benefits of act, PLB and d/c plans   Family Acceptance, E,TB, VU by CM at 4/6/2023 1350                   Point: Home exercise program (Done)     Learning Progress Summary           Patient Acceptance, E,TB, VU by CM at 4/6/2023 1350   Family Acceptance, E,TB, VU by CM at 4/6/2023 1350                   Point: Body mechanics (Done)     Learning Progress Summary           Patient Acceptance, E,TB, VU by CM at 4/6/2023 1350   Family Acceptance, E,TB, VU by CM at 4/6/2023 1350                   Point: Precautions (Done)     Learning Progress Summary           Patient Acceptance, E,TB, VU by CM at 4/6/2023 1350    Acceptance, E, VU by SB at 4/5/2023 1605    Comment: helmet fit, safety, POC    Acceptance, E, VU by SB at 4/1/2023 1405    Comment: pt edu on POC, benefits of act, PLB and d/c plans   Family Acceptance, E,TB, VU by CM at 4/6/2023 1350                               User Key     Initials Effective Dates Name Provider Type Discipline    JUAN 02/03/23 -  Carrie Callaway, PTA Physical Therapist Assistant PT    CHRISTIANO 02/03/23 -  Александр Saravia PTA Physical Therapist Assistant PT    CM 03/02/23 -  Dianelys Carlton, RN Registered Nurse Nurse    SB 06/16/21 -  Erika Javier, PT DPT Physical Therapist PT              PT Recommendation and Plan     Plan of Care Reviewed With: patient  Progress: improving  Outcome Evaluation: Pt agreeable to tx. Bed mob Mod Ind to EOB w/ HOB elevated. Seated AROM BLE x15. Stood and amb to the bathroom and performed tasks w/ supervision. No LOB. Amb 100' x4 w/ RW and CGA. No LOB or difficulty. Pt amb on 4L. Pt c/o increased fatigue and heartbrun once done w/ activity. RN aware.   Outcome Measures     Row Name 04/15/23 0720 04/14/23 0903 04/13/23 1300       How much help from another person do you currently need...    Turning from your back to your side while in flat  bed without using bedrails? 4  -TB 4  -TB 4  -AH    Moving from lying on back to sitting on the side of a flat bed without bedrails? 4  -TB 4  -TB 4  -AH    Moving to and from a bed to a chair (including a wheelchair)? 4  -TB 4  -TB 3  -AH    Standing up from a chair using your arms (e.g., wheelchair, bedside chair)? 4  -TB 4  -TB 3  -AH    Climbing 3-5 steps with a railing? 3  -TB 3  -TB 3  -AH    To walk in hospital room? 3  -TB 3  -TB 3  -AH    AM-PAC 6 Clicks Score (PT) 22  -TB 22  -TB 20  -AH       Functional Assessment    Outcome Measure Options AM-PAC 6 Clicks Basic Mobility (PT)  -TB AM-PAC 6 Clicks Basic Mobility (PT)  -TB AM-PAC 6 Clicks Basic Mobility (PT)  -AH          User Key  (r) = Recorded By, (t) = Taken By, (c) = Cosigned By    Initials Name Provider Type     Mary Alice Benton, PTA Physical Therapist Assistant    Tere Berger PTA Physical Therapist Assistant                 Time Calculation:    PT Charges     Row Name 04/15/23 0756             Time Calculation    Start Time 0720  -TB      Stop Time 0746  -TB      Time Calculation (min) 26 min  -TB      PT Received On 04/15/23  -TB         Time Calculation- PT    Total Timed Code Minutes- PT 26 minute(s)  -TB            User Key  (r) = Recorded By, (t) = Taken By, (c) = Cosigned By    Initials Name Provider Type    TB Tere Miller, PTA Physical Therapist Assistant              Therapy Charges for Today     Code Description Service Date Service Provider Modifiers Qty    33346833719 HC GAIT TRAINING EA 15 MIN 4/14/2023 Tere Miller, PTA GP 2    05131755674 HC PT THER PROC EA 15 MIN 4/14/2023 Tere Miller, PTA GP 1    33171526031 HC GAIT TRAINING EA 15 MIN 4/14/2023 Tere Miller, PTA GP 2    46858484777 HC GAIT TRAINING EA 15 MIN 4/15/2023 Tere Miller, PTA GP 2          PT G-Codes  Outcome Measure Options: AM-PAC 6 Clicks Basic Mobility (PT)  AM-PAC 6 Clicks Score (PT): 22  AM-PAC 6 Clicks  Score (OT): 20    Tere Miller, PTA  4/15/2023

## 2023-04-15 NOTE — PLAN OF CARE
Problem: Adult Inpatient Plan of Care  Goal: Plan of Care Review  Outcome: Ongoing, Progressing  Flowsheets (Taken 4/15/2023 0450)  Progress: no change  Plan of Care Reviewed With: patient  Goal: Patient-Specific Goal (Individualized)  Outcome: Ongoing, Progressing  Goal: Absence of Hospital-Acquired Illness or Injury  Outcome: Ongoing, Progressing  Intervention: Identify and Manage Fall Risk  Recent Flowsheet Documentation  Taken 4/15/2023 0400 by Aryan Maciel RN  Safety Promotion/Fall Prevention: safety round/check completed  Taken 4/15/2023 0200 by Aryan Maciel RN  Safety Promotion/Fall Prevention: safety round/check completed  Taken 4/15/2023 0000 by Aryan Maciel RN  Safety Promotion/Fall Prevention: safety round/check completed  Taken 4/14/2023 2200 by Aryan Maciel RN  Safety Promotion/Fall Prevention: safety round/check completed  Taken 4/14/2023 2000 by Aryan Maciel RN  Safety Promotion/Fall Prevention: safety round/check completed  Intervention: Prevent Skin Injury  Recent Flowsheet Documentation  Taken 4/15/2023 0400 by Aryan Maciel RN  Body Position: position changed independently  Taken 4/15/2023 0200 by Aryan Maciel RN  Body Position: position changed independently  Taken 4/15/2023 0000 by Aryan Maciel RN  Body Position: position changed independently  Taken 4/14/2023 2200 by Aryan Maciel RN  Body Position: position changed independently  Taken 4/14/2023 2000 by Aryan Maciel RN  Body Position: position changed independently  Intervention: Prevent and Manage VTE (Venous Thromboembolism) Risk  Recent Flowsheet Documentation  Taken 4/14/2023 2000 by Aryan Maciel RN  Activity Management: up in chair  VTE Prevention/Management:   sequential compression devices off   patient refused intervention  Range of Motion: active ROM (range of motion) encouraged  Intervention: Prevent Infection  Recent Flowsheet Documentation  Taken 4/14/2023 2000 by Aryan Maciel  RN  Infection Prevention: rest/sleep promoted  Goal: Optimal Comfort and Wellbeing  Outcome: Ongoing, Progressing  Intervention: Monitor Pain and Promote Comfort  Recent Flowsheet Documentation  Taken 4/14/2023 2000 by Aryan Maciel RN  Pain Management Interventions: see MAR  Intervention: Provide Person-Centered Care  Recent Flowsheet Documentation  Taken 4/14/2023 2000 by Aryan Maciel RN  Trust Relationship/Rapport:   questions encouraged   questions answered   care explained  Goal: Readiness for Transition of Care  Outcome: Ongoing, Progressing     Problem: Diabetes Comorbidity  Goal: Blood Glucose Level Within Targeted Range  Outcome: Ongoing, Progressing  Intervention: Monitor and Manage Glycemia  Recent Flowsheet Documentation  Taken 4/14/2023 2000 by Aryan Maciel RN  Glycemic Management: blood glucose monitored     Problem: Hypertension Comorbidity  Goal: Blood Pressure in Desired Range  Outcome: Ongoing, Progressing  Intervention: Maintain Blood Pressure Management  Recent Flowsheet Documentation  Taken 4/14/2023 2000 by Aryan Maciel RN  Syncope Management: position changed slowly  Medication Review/Management: medications reviewed     Problem: Fall Injury Risk  Goal: Absence of Fall and Fall-Related Injury  Outcome: Ongoing, Progressing  Intervention: Identify and Manage Contributors  Recent Flowsheet Documentation  Taken 4/14/2023 2000 by Aryan Maciel RN  Medication Review/Management: medications reviewed  Intervention: Promote Injury-Free Environment  Recent Flowsheet Documentation  Taken 4/15/2023 0400 by Aryan Maciel RN  Safety Promotion/Fall Prevention: safety round/check completed  Taken 4/15/2023 0200 by Aryan Maciel RN  Safety Promotion/Fall Prevention: safety round/check completed  Taken 4/15/2023 0000 by Aryan Maciel RN  Safety Promotion/Fall Prevention: safety round/check completed  Taken 4/14/2023 2200 by Aryan Maciel RN  Safety Promotion/Fall Prevention:  safety round/check completed  Taken 4/14/2023 2000 by Aryan Maciel RN  Safety Promotion/Fall Prevention: safety round/check completed     Problem: Skin Injury Risk Increased  Goal: Skin Health and Integrity  Outcome: Ongoing, Progressing  Intervention: Optimize Skin Protection  Recent Flowsheet Documentation  Taken 4/15/2023 0400 by Aryan Maciel RN  Head of Bed (HOB) Positioning: HOB elevated  Taken 4/15/2023 0200 by Aryan Maciel RN  Head of Bed (HOB) Positioning: HOB elevated  Taken 4/15/2023 0000 by Aryan Maciel RN  Head of Bed (HOB) Positioning: HOB elevated  Taken 4/14/2023 2200 by Aryan Maciel RN  Head of Bed (HOB) Positioning: HOB elevated     Problem: Oral Intake Inadequate  Goal: Improved Oral Intake  Outcome: Ongoing, Progressing   Goal Outcome Evaluation:  Plan of Care Reviewed With: patient        Progress: no change

## 2023-04-15 NOTE — PROGRESS NOTES
Neurosurgery Daily Progress Note    HPI:  Elijah Escobar is a 67 y.o. male with a significant medical history of hypertension, diabetes, hyperlipidemia, seizures, craniotomy for tumor (6/16/2022), craniotomy for washout (7/1/2022), craniectomy (10/4/2022), coronary artery disease, diabetes, erectile dysfunction, GERD, hypertension, hyperlipidemia, tobacco abuse, and obesity.  He presents today with a complaint of a 4-5 days onset of progressively worsening right frontal, temporal, and periorbital edema and associated symptoms to include, but not limited to generalized fatigue, chills, dyspnea, intermittent nausea without vomiting, and states he's felt feverish.  Physical exam findings of neurologically intact with right frontal, temporal, and periorbital swelling.  WBC elevated at 15.  3 to an CRP elevated at 14.75.  Imaging pending.    Assessment:   Past Medical History:   Diagnosis Date   • Brain tumor    • Coronary artery disease    • COVID-19 vaccine series completed     MODERNA X 3; LAST DOSE 3/2022   • Diabetes    • Erectile dysfunction    • GERD (gastroesophageal reflux disease)    • Hypercholesteremia    • Hypertension    • Seizure      Active Hospital Problems    Diagnosis    • **Swelling of scalp    • COPD (chronic obstructive pulmonary disease)    • Obesity (BMI 30-39.9)    • Tobacco abuse, in remission    • Acute systolic heart failure    • ADDIE (obstructive sleep apnea)    • Type 2 myocardial infarction due to arrhythmia    • Acute pulmonary edema due to A-fib RVR    • Acute respiratory failure with hypoxia    • Intracranial epidural hematoma    • Post-operative infection    • Paroxysmal atrial fibrillation with rapid ventricular response    • Type 2 diabetes mellitus with hyperglycemia and peripheral neuropathy, with long-term current use of insulin (HCC)    • Coronary artery disease    • Meningioma s/p craniotomy      Plan:   Neuro: Stable, intact/at baseline.  Mild confusion    Infected cranial  "flap      POD #10 (4/4/2023)  craniectomy and washout    Postop CT stable. No acute abnormalities.    Flap tapped I&D cultures 4+ gram-negative bacilli    Heavy 4+ Serratia marcescens     3 Days Post-Op (4/13/2023) evacuation of scalp hematoma    JUAN removed today    Continue neuro exams per policy.  Call for decline    CV: AFib with RVR and heart strain and trop bump.  Stabilized    Hold all anticoagulation for 2 weeks.   Appreciate cardiology  Pulm: Chest x-ray cleared  :  Voiding.   FEN: Tolerating carb consistent diet  Endocrine: Glucose improving.  Continue SSI  GI: SHERIF.  +BM  ID:  Infected cranial flap  CSF: no growth to date   PNA.  Resolved   UTI.  Resolved   Continue meropenem per ID.  Appreciate assist  Heme:  DVT prophylaxis with SCDs.  Hold all anticoagulation for 2 weeks.  Pain: Tolerable at present  Dispo: PT/OT    [facility]  Patient agreeable   DC pending drain placement.     Chief complaint:   4-5 days onset of progressively worsening right frontal, temporal, and periorbital edema and associated symptoms to include, but not limited to generalized fatigue, chills, dyspnea, intermittent nausea without vomiting, and states he's felt feverish.    HPI  Subjective  Doing well    Temp:  [97.6 °F (36.4 °C)-98.5 °F (36.9 °C)] 97.6 °F (36.4 °C)  Heart Rate:  [] 98  Resp:  [18-20] 18  BP: (124-132)/(67-95) 124/71    Output by Drain (mL) 04/14/23 0701 - 04/14/23 1900 04/14/23 1901 - 04/15/23 0700 04/15/23 0701 - 04/15/23 1010 Range Total   Closed/Suction Drain 1 Right Scalp Bulb  0  0     Objective:  Vital signs: (most recent): Blood pressure 124/71, pulse 98, temperature 97.6 °F (36.4 °C), temperature source Oral, resp. rate 18, height 177.8 cm (70\"), weight 123 kg (270 lb 4.5 oz), SpO2 94 %.      Neurologic Exam     Mental Status   Oriented to person, place, and time.   Speech: speech is normal   Level of consciousness: alert    Oriented x3.  Follows commands without prompting showing " thumbs up and 2 fingers bilaterally.     Cranial Nerves     CN III, IV, VI   Pupils are equal, round, and reactive to light.  Extraocular motions are normal.     CN V   Facial sensation intact.     CN VII   Facial expression full, symmetric.     Motor Exam   Right arm pronator drift: absent  Left arm pronator drift: absent    Strength   Right deltoid: 5/5  Left deltoid: 5/5  Right biceps: 5/5  Left biceps: 5/5  Right triceps: 5/5  Left triceps: 5/5  Right iliopsoas: 5/5  Left iliopsoas: 5/5  Right quadriceps: 5/5  Left quadriceps: 5/5  Right gastroc: 5/5  Left gastroc: 5/5  Moves all extremities equal and symmetric     Sensory Exam   Light touch normal.     Drains: * No LDAs found *    Imaging Results (Last 24 Hours)     ** No results found for the last 24 hours. **        Lab Results (last 24 hours)     Procedure Component Value Units Date/Time    POC Glucose Once [[id number]]  (Abnormal) Collected: 04/15/23 0744    Specimen: Blood Updated: 04/15/23 0755     Glucose 186 mg/dL      Comment: : [id number] Nadir Sanchez SMeter ID: [id number]       Digitoxin Level [[id number]] Collected: 04/14/23 1646    Specimen: Blood Updated: 04/14/23 1738    POC Glucose Once [[id number]]  (Normal) Collected: 04/14/23 1657    Specimen: Blood Updated: 04/14/23 1715     Glucose 85 mg/dL      Comment: : [id number] Rm KimMeter ID: [id number]       AFB Culture - Cerebrospinal Fluid, Lumbar Puncture [[id number]] Collected: 03/31/23 1450    Specimen: Cerebrospinal Fluid from Lumbar Puncture Updated: 04/14/23 1531     AFB Culture No AFB isolated at 2 weeks     AFB Stain No acid fast bacilli seen on direct smear    POC Glucose Once [[id number]]  (Normal) Collected: 04/14/23 1134    Specimen: Blood Updated: 04/14/23 1145     Glucose 80 mg/dL      Comment: : [id number] Marsha PaulabyMeter ID: [id number]           Flaco Portillo MD

## 2023-04-16 NOTE — PROGRESS NOTES
RT EQUIPMENT DEVICE RELATED - SKIN ASSESSMENT    RT Medical Equipment/Device:     ETT Martin/Anchorfast    Skin Assessment:      Cheek:  Intact  Neck:  Intact  Lips:  Intact  Mouth:  Intact    Device Skin Pressure Protection:  Skin-to-device areas padded:  Anchorfast    Nurse Notification:  Kylie Coates, RRT

## 2023-04-16 NOTE — PROGRESS NOTES
Neurosurgery Daily Progress Note    HPI:  Elijah Escobar is a 67 y.o. male with a significant medical history of hypertension, diabetes, hyperlipidemia, seizures, craniotomy for tumor (6/16/2022), craniotomy for washout (7/1/2022), craniectomy (10/4/2022), coronary artery disease, diabetes, erectile dysfunction, GERD, hypertension, hyperlipidemia, tobacco abuse, and obesity.  He presents today with a complaint of a 4-5 days onset of progressively worsening right frontal, temporal, and periorbital edema and associated symptoms to include, but not limited to generalized fatigue, chills, dyspnea, intermittent nausea without vomiting, and states he's felt feverish.  Physical exam findings of neurologically intact with right frontal, temporal, and periorbital swelling.  WBC elevated at 15.  3 to an CRP elevated at 14.75.  Imaging pending.    Assessment:   Past Medical History:   Diagnosis Date   • Brain tumor    • Coronary artery disease    • COVID-19 vaccine series completed     MODERNA X 3; LAST DOSE 3/2022   • Diabetes    • Erectile dysfunction    • GERD (gastroesophageal reflux disease)    • Hypercholesteremia    • Hypertension    • Seizure      Active Hospital Problems    Diagnosis    • **Swelling of scalp    • COPD (chronic obstructive pulmonary disease)    • Obesity (BMI 30-39.9)    • Tobacco abuse, in remission    • Acute systolic heart failure    • ADDIE (obstructive sleep apnea)    • Type 2 myocardial infarction due to arrhythmia    • Acute pulmonary edema due to A-fib RVR    • Acute respiratory failure with hypoxia    • Intracranial epidural hematoma    • Post-operative infection    • Paroxysmal atrial fibrillation with rapid ventricular response    • Type 2 diabetes mellitus with hyperglycemia and peripheral neuropathy, with long-term current use of insulin (HCC)    • Coronary artery disease    • Meningioma s/p craniotomy      Plan:   Neuro: Stable, intact/at baseline.  Confusion and slurred speech    Infected  "cranial flap      POD #11 (4/4/2023)  craniectomy and washout    Postop CT stable. No acute abnormalities.    Flap tapped I&D cultures 4+ gram-negative bacilli    Heavy 4+ Serratia marcescens     4 Days Post-Op (4/13/2023) evacuation of scalp hematoma    JUAN removed today    Continue neuro exams per policy.  Call for decline    CV: AFib with RVR and heart strain and trop bump.  Stabilized    Hold all anticoagulation for 2 weeks.   Appreciate cardiology  Pulm: Chest x-ray with fluid overload   Diuresis and vapotherm   Holding O2 sats.  Okay for bipap  :  Voiding.   FEN: Tolerating carb consistent diet  Endocrine: Glucose improving.  Continue SSI  GI: SHERIF.  +BM  ID:  Infected cranial flap  CSF: no growth to date   PNA.  Resolved   UTI.  Resolved   Continue meropenem per ID.  Appreciate assist  Heme:  DVT prophylaxis with SCDs.  Hold all anticoagulation for 2 weeks.  Pain: Tolerable at present  Dispo: PT/OT       Chief complaint:   4-5 days onset of progressively worsening right frontal, temporal, and periorbital edema and associated symptoms to include, but not limited to generalized fatigue, chills, dyspnea, intermittent nausea without vomiting, and states he's felt feverish.    HPI  Subjective  Confusion and slurred speech    Temp:  [97.6 °F (36.4 °C)-98.1 °F (36.7 °C)] 98 °F (36.7 °C)  Heart Rate:  [] 100  Resp:  [14-24] 18  BP: (113-160)/(60-94) 155/94    Output by Drain (mL) 04/15/23 0701 - 04/15/23 1900 04/15/23 1901 - 04/16/23 0700 04/16/23 0701 - 04/16/23 0919 Range Total   Patient has no LDAs of requested type attached.     Objective:  Vital signs: (most recent): Blood pressure 155/94, pulse 100, temperature 98 °F (36.7 °C), temperature source Axillary, resp. rate 18, height 177.8 cm (70\"), weight 123 kg (272 lb), SpO2 96 %.      Neurologic Exam     Mental Status   Oriented to person, place, and time.   Speech: speech is normal   Level of consciousness: alert    Oriented x3.  Follows commands without " prompting showing thumbs up and 2 fingers bilaterally.     Cranial Nerves     CN III, IV, VI   Pupils are equal, round, and reactive to light.  Extraocular motions are normal.     CN V   Facial sensation intact.     CN VII   Facial expression full, symmetric.     Motor Exam   Right arm pronator drift: absent  Left arm pronator drift: absent    Strength   Right deltoid: 5/5  Left deltoid: 5/5  Right biceps: 5/5  Left biceps: 5/5  Right triceps: 5/5  Left triceps: 5/5  Right iliopsoas: 5/5  Left iliopsoas: 5/5  Right quadriceps: 5/5  Left quadriceps: 5/5  Right gastroc: 5/5  Left gastroc: 5/5  Moves all extremities equal and symmetric     Sensory Exam   Light touch normal.     Drains: * No LDAs found *    Imaging Results (Last 24 Hours)     Procedure Component Value Units Date/Time    XR Chest 1 View [[id number]] Collected: 04/16/23 0647     Updated: 04/16/23 0651    Narrative:      EXAMINATION: Chest 1 view 04/15/2023     HISTORY: Shortness of breath.     FINDINGS: Today's exam is compared to previous study of 10 days earlier.  There is evidence of congestive heart failure with vascular  redistribution and interstitial pulmonary edema. Small effusions are  present blunting the costophrenic angles.       Impression:      1.. Congestive failure with interstitial pulmonary edema and small  effusions.  This report was finalized on 04/16/2023 06:48 by Dr. Zack Mendoza MD.        Lab Results (last 24 hours)     Procedure Component Value Units Date/Time    POC Glucose Once [[id number]]  (Abnormal) Collected: 04/16/23 0745    Specimen: Blood Updated: 04/16/23 0757     Glucose 170 mg/dL      Comment: : leilani Jara Josef ID: [id number]       Blood Gas, Arterial - [[id number]]  (Abnormal) Collected: 04/15/23 2308    Specimen: Arterial Blood Updated: 04/16/23 0614     Site Left Brachial     Marek's Test N/A     pH, Arterial 7.411 pH units      pCO2, Arterial 35.8 mm Hg      pO2, Arterial 65.7 mm Hg      Comment:  84 Value below reference range        HCO3, Arterial 22.8 mmol/L      Base Excess, Arterial -1.5 mmol/L      Comment: 84 Value below reference range        O2 Saturation, Arterial 92.9 %      Comment: 84 Value below reference range        Temperature 37.0 C      Barometric Pressure for Blood Gas 746 mmHg      Modality Nasal Cannula     Flow Rate 4.0 lpm      Ventilator Mode NA     Collected by [id number]     Comment: Meter: [id number]     :  [id number]        pCO2, Temperature Corrected 35.8 mm Hg      pH, Temp Corrected 7.411 pH Units      pO2, Temperature Corrected 65.7 mm Hg     POC Glucose Once [[id number]]  (Abnormal) Collected: 04/15/23 2252    Specimen: Blood Updated: 04/15/23 2302     Glucose 163 mg/dL      Comment: : [id number] [place] ID: [id number]       POC Glucose Once [[id number]]  (Normal) Collected: 04/15/23 1631    Specimen: Blood Updated: 04/15/23 1642     Glucose 113 mg/dL      Comment: : [id number] [employer] ID: [id number]       POC Glucose Once [[id number]]  (Abnormal) Collected: 04/15/23 1024    Specimen: Blood Updated: 04/15/23 1035     Glucose 169 mg/dL      Comment: : [id number] Nadir Sanchez SMeter ID: [id number]           Flaco Portillo MD

## 2023-04-16 NOTE — PLAN OF CARE
Goal Outcome Evaluation:    Problem: Communication Impairment (Mechanical Ventilation, Invasive)  Goal: Effective Communication  Outcome: Ongoing, Progressing     Problem: Device-Related Complication Risk (Mechanical Ventilation, Invasive)  Goal: Optimal Device Function  Outcome: Ongoing, Progressing     Problem: Inability to Wean (Mechanical Ventilation, Invasive)  Goal: Mechanical Ventilation Liberation  Outcome: Ongoing, Progressing     Problem: Nutrition Impairment (Mechanical Ventilation, Invasive)  Goal: Optimal Nutrition Delivery  Outcome: Ongoing, Progressing     Problem: Skin and Tissue Injury (Mechanical Ventilation, Invasive)  Goal: Absence of Device-Related Skin and Tissue Injury  Outcome: Ongoing, Progressing     Problem: Ventilator-Induced Lung Injury (Mechanical Ventilation, Invasive)  Goal: Absence of Ventilator-Induced Lung Injury  Outcome: Ongoing, Progressing

## 2023-04-16 NOTE — PLAN OF CARE
Goal Outcome Evaluation:  Plan of Care Reviewed With: patient           Outcome Evaluation: Pt AxOx4, Up x 1 w/ cane and helmet to BTR.  Tele running sinus to afib .  Pt complained of being SOA vitals normal, requested breathing tx for pt.and spoke w/ Dr. Singh.  Prn Atarax given for anxiety.   Prn pain medication given x 2 for generalized pain w/ moderate results.  Pt anxious and frequently calls out for staff.   I

## 2023-04-16 NOTE — CONSULTS
[facility] [place] [place] [facility]    23, 11:37 CDT  Patient Care Team:  Brianna Martinez APRN as PCP - General (Nurse Practitioner)  Rahul Arnold APRN as Nurse Practitioner (Nurse Practitioner)  Flaco Portillo MD as Surgeon (Neurosurgery)  Name: Elijah Escobar  : 1955  MRN: [id number]  Contact Serial Number [id number]    Chief complaint: Shortness of breath, increasing oxygen requirement, congestive vascular heart failure, COPD, obesity, possible sleep apnea, congestive diastolic heart failure, atrial fibrillation, history of tobacco abuse,    HPI:  We have been consulted by Ramin Singh DO to see this 67 y.o. male.  Patient was actually admitted under Dr. Portillo and is known to us from the recent pulmonary consult done earlier this month about 2 weeks ago.    Patient had a  brain tumor and had a craniotomy done in 2022 and he had multiple interventions after the surgery including craniotomy washout craniectomy and still had a flap in place.  He presented with recurrent cellulitis and needed another surgery by Dr. Portillo this time. He also has hypertension hyperlipidemia, seizure disorder, diabetes mellitus, hyperlipidemia, erectile dysfunction, tobacco abuse and morbid obesity.  He was on supplemental oxygen and used BiPAP briefly while in the ICU and later improved and went to the medical floor.  In the floor patient was placed back on oxygen for oxygen desaturation and getting increasingly short of breath and had altered mental status and confusion.  Patient is also followed by Dr. Hamlin on the hospitalist who moved the patient to CCU and ordered a chest x-ray and further work-up.      The chest x-ray shows bilateral pulm infiltrates suggesting heart failure.  The infection work-up was negative and there is no evidence of pneumonia noted.  His last CT scan showed small bilateral effusion pulmonary infiltrate and probably some  bronchiectatic changes with the COPD.  He was started on Lasix and the dose was increased.  He was also placed on Pulmicort and DuoNeb.  He was on Vapotherm last night 35 L flow and 35% FiO2 but this morning he is requiring 10 L of oxygen.  At time of her visit he was appears confused and when I asked him what is going on now he told me he just went backwards.  Patient is still on meropenem for treatment of cellulitis.  The patient had no fever.    Reviewed the echocardiogram and the chest imaging study and appears patient also has underlying pulmonary hypertension.  He was not on any oxygen or any BiPAP or CPAP at home.  No further history could be obtained from the patient.    Past Medical History:   has a past medical history of Brain tumor, Coronary artery disease, COVID-19 vaccine series completed, Diabetes, Erectile dysfunction, GERD (gastroesophageal reflux disease), Hypercholesteremia, Hypertension, and Seizure.   has a past surgical history that includes Colonoscopy; Balloon angioplasty, artery (Right); Craniotomy for Tumor (Right, 6/16/2022); Craniectomy (Right, 7/1/2022); Cranioplasty (Right, 10/4/2022); Cranioplasty (N/A, 4/4/2023); and Craniotomy (Right, 4/12/2023).  No Known Allergies  Medications:  aspirin, 81 mg, Oral, Daily  atorvastatin, 20 mg, Oral, Nightly  budesonide, 0.5 mg, Nebulization, BID - RT  budesonide-formoterol, 2 puff, Inhalation, BID - RT  budesonide-formoterol, 2 puff, Inhalation, BID - RT  digoxin, 250 mcg, Oral, Daily  dilTIAZem CD, 300 mg, Oral, Q24H  furosemide, 40 mg, Intravenous, BID  gabapentin, 300 mg, Oral, TID  insulin detemir, 10 Units, Subcutaneous, Q12H  insulin lispro, 0-14 Units, Subcutaneous, TID AC  levETIRAcetam, 500 mg, Oral, BID  meropenem, 1 g, Intravenous, Q8H  methylnaltrexone, 12 mg, Subcutaneous, Every Other Day  metoclopramide, 10 mg, Oral, 4x Daily AC & at Bedtime  metoprolol tartrate, 50 mg, Oral, Q12H  multivitamin with minerals, 1 tablet, Oral,  Daily  pantoprazole, 40 mg, Oral, Q AM  polyethylene glycol, 17 g, Oral, Daily  potassium chloride, 20 mEq, Oral, Daily  sennosides-docusate, 1 tablet, Oral, Daily  sodium chloride, 10 mL, Intravenous, Q12H  sucralfate, 1 g, Oral, 4x Daily AC & at Bedtime         Family History:  Family History   Problem Relation Age of Onset   • Cancer Mother         liver   • Heart disease Father      Social History:   reports that he quit smoking about 4 weeks ago. His smoking use included cigarettes. He has a 3.75 pack-year smoking history. He has never used smokeless tobacco. He reports that he does not drink alcohol and does not use drugs.  Review of Systems:  Review of Systems   Unable to perform ROS: Mental status change      Physical Exam:  Temp:  [97.8 °F (36.6 °C)-98.1 °F (36.7 °C)] 98 °F (36.7 °C)  Heart Rate:  [] 81  Resp:  [14-24] 18  BP: (113-160)/(60-94) 152/94    Intake/Output Summary (Last 24 hours) at 4/16/2023 1137  Last data filed at 4/15/2023 2043  Gross per 24 hour   Intake --   Output 400 ml   Net -400 ml         04/07/23  0350 04/11/23  1140 04/16/23  0125   Weight: 121 kg (267 lb 9.6 oz) 123 kg (270 lb 4.5 oz) 123 kg (272 lb)     SpO2 Percentage    04/16/23 1018 04/16/23 1035 04/16/23 1100   SpO2: 99% 95% 97%     Body mass index is 39.03 kg/m².   Physical Exam  Constitutional:       General: He is not in acute distress.     Appearance: He is ill-appearing.      Comments: Middle-aged -American male looks tired and exhausted   HENT:      Head: Normocephalic and atraumatic.      Right Ear: Tympanic membrane normal. There is no impacted cerumen.      Left Ear: There is no impacted cerumen.      Nose: Nose normal. No congestion or rhinorrhea.      Mouth/Throat:      Mouth: Mucous membranes are moist.      Pharynx: Oropharynx is clear. No oropharyngeal exudate or posterior oropharyngeal erythema.   Eyes:      General: No scleral icterus.        Right eye: No discharge.         Left eye: No discharge.       Extraocular Movements: Extraocular movements intact.      Pupils: Pupils are equal, round, and reactive to light.   Neck:      Vascular: No carotid bruit.   Cardiovascular:      Rate and Rhythm: Tachycardia present. Rhythm irregular.      Pulses: Normal pulses.      Heart sounds: Normal heart sounds. No murmur heard.    No friction rub. No gallop.   Pulmonary:      Effort: Respiratory distress present.      Breath sounds: No stridor. Rales present. No wheezing.      Comments: Please breath sound bilaterally  Abdominal:      General: Abdomen is flat. Bowel sounds are normal. There is no distension.      Palpations: There is no mass.      Tenderness: There is no abdominal tenderness. There is no right CVA tenderness, left CVA tenderness or guarding.   Genitourinary:     Comments: Not examined  Musculoskeletal:         General: No swelling, tenderness or deformity.      Cervical back: Normal range of motion. No rigidity or tenderness.      Right lower leg: Edema present.      Left lower leg: Edema present.   Lymphadenopathy:      Cervical: No cervical adenopathy.   Skin:     General: Skin is warm.      Capillary Refill: Capillary refill takes less than 2 seconds.      Coloration: Skin is not jaundiced or pale.      Findings: No bruising or rash.   Neurological:      General: No focal deficit present.      Mental Status: He is alert. He is disoriented.      Cranial Nerves: No cranial nerve deficit.      Sensory: No sensory deficit.      Motor: Weakness present.      Deep Tendon Reflexes: Reflexes normal.   Psychiatric:      Comments: Difficulty axis but appears to be little pleasantly confused.       Result Review  Results from last 7 days   Lab Units 04/12/23  0939   WBC 10*3/mm3 10.00   HEMOGLOBIN g/dL 11.1*   PLATELETS 10*3/mm3 209     Results from last 7 days   Lab Units 04/12/23  0939   SODIUM mmol/L 142   POTASSIUM mmol/L 4.1   CO2 mmol/L 27.0   BUN mg/dL 16   CREATININE mg/dL 0.94   GLUCOSE mg/dL 135*      Results from last 7 days   Lab Units 04/15/23  2308   PH, ARTERIAL pH units 7.411   PCO2, ARTERIAL mm Hg 35.8   PO2 ART mm Hg 65.7*     Microbiology Results (last 10 days)     ** No results found for the last 240 hours. **        Recent radiology:   Imaging Results (Last 72 Hours)     Procedure Component Value Units Date/Time    XR Chest 1 View [[id number]] Collected: 04/16/23 0647     Updated: 04/16/23 0651    Narrative:      EXAMINATION: Chest 1 view 04/15/2023     HISTORY: Shortness of breath.     FINDINGS: Today's exam is compared to previous study of 10 days earlier.  There is evidence of congestive heart failure with vascular  redistribution and interstitial pulmonary edema. Small effusions are  present blunting the costophrenic angles.       Impression:      1.. Congestive failure with interstitial pulmonary edema and small  effusions.  This report was finalized on 04/16/2023 06:48 by Dr. Zack Mendoza MD.          Other test results (not lab or imaging): Results for orders placed during the hospital encounter of 03/31/23    Adult Transthoracic Echo Complete W/ Cont if Necessary Per Protocol    Interpretation Summary  •  Left ventricular systolic function is mildly decreased. Left ventricular ejection fraction appears to be 46 - 50%.  •  Left ventricular wall thickness is consistent with mild to moderate concentric hypertrophy.  •  The following left ventricular wall segments are hypokinetic: apical anterior, apical lateral, apical inferior, apical septal, apex hypokinetic and mid anteroseptal.  •  Left atrial volume is mildly increased.  •  Estimated right ventricular systolic pressure from tricuspid regurgitation is mildly elevated (35-45 mmHg).  •  Normal size and function the right ventricle.  •  No significant (greater than mild) valvular pathology.  •  Mild dilation of the aortic root is present.  •  Compared to prior exam from 6/19/2022, both studies were technically difficult, but upon my  independent review of the previous exam, there did appear to be some mild apical hypokinesis present on that study that does look slightly more prominent on current exam.  Reviewed and agree with current interpretation.  Patient is low ejection fraction and mild pulmonary hypertension  Independent review of ekg: Done  Problem List as identified by Epic (may contain historical, inactive problems)    Swelling of scalp    Meningioma s/p craniotomy    Coronary artery disease    Type 2 diabetes mellitus with hyperglycemia and peripheral neuropathy, with long-term current use of insulin (HCC)    Paroxysmal atrial fibrillation with rapid ventricular response    Post-operative infection    Acute pulmonary edema due to A-fib RVR    Acute respiratory failure with hypoxia    Type 2 myocardial infarction due to arrhythmia    COPD (chronic obstructive pulmonary disease)    Obesity (BMI 30-39.9)    Tobacco abuse, in remission    Acute systolic heart failure    ADDIE (obstructive sleep apnea)    Intracranial epidural hematoma    Pulmonary Assessment:  1. Acute on chronic hypoxic respiratory failure with increasing oxygen requirement  2. Bilateral pulm infiltrate likely congestive diastolic heart failure  3. Community-acquired pneumonia versus interstitial pneumonitis still on antibiotic  4. Pulmonary edema and volume overload  5. Lung nodules and chronic scarring in the lung  6. Atrial fibrillation on Cardizem now  7. COPD with history of tobacco abuse  8. Meningioma s/p craniotomy.    9. S/p washout craniotomy and cranioplasty with flap infection  10. Altered mental status  11. Hypertension  12. Diabetes mellitus insulin-dependent  13. Coronary artery disease  14. Obesity likely obstructive sleep apnea  15. Seizure disorder  16. Chronic pain    Recommend/plan:   · Patient has increased oxygen requirement and currently on 10 L of oxygen  · This is most likely from the worsening heart failure and fluid overload.  · Diuresis started.   Monitor renal function and electrolytes.  · He is started on Pulmicort and DuoNeb which will be continued  · Plan for outpatient PFT and sleep study for possible sleep apnea and COPD.  · He had history of tobacco abuse.  He is on Cardizem for A-fib.  · Altered mental status could be from hypoxia or patient may have UTI.  Will order a urine analysis.  · Patient is already on meropenem for the cellulitis of the craniotomy flap.  · He is afebrile and does not have any leukocytosis.  · Continue titrating oxygen and monitor oxygen saturation.  Plan for home oxygen evaluation.  · Patient may use BiPAP at night and will need formal sleep study as outpatient  · We will order arterial blood gas for further evaluation.  Incentive spirometry will be encouraged  · We can use Vapotherm as a backup with high flow oxygen but current goal is to decrease oxygen as tolerated  · Continue current treatment plan.  Discussed care plan with Dr. Singh.  · CODE STATUS: Full.  Overall prognosis: Guarded.  · We appreciate the consult and we will follow.      Thank you for this consult.  We will follow along.     Electronically signed by     Tracy Gupta MD,   Pulmonologist/Intensivist   04/16/23, 11:37 AM CDT.    Interval progress note    Patient reported shortness of breath this afternoon and had increased air hunger and received morphine and developed cardiac arrest.  CODE BLUE was called he had CPR and was intubated at the bedside by Dr. Chavez.  Dr. Singh was present in the room.  He is placed on full mechanical ventilation.  Dr. Portillo is also coming to reevaluate the patient.  Patient is going to get a central line placement done at this time.  Current ventilator settings are assist-control/volume control ventilation, tidal volume 500, rate 14, PEEP of 5 and FiO2 100% and oxygen saturation 98%.  We will keep him on full assist-control volume control mechanical ventilation and use propofol for sedation.  Postintubation chest  x-ray ordered.  Blood gas will be done tomorrow morning.    Discussed care plan with Dr. Portillo and Dr. Singh.  Apparently patient was not on any prophylactic anticoagulation due to the development of hematoma in the craniotomy flap site and there is a risk of pulmonary embolism according to Dr. Portillo.  We will order a CT angiogram and a ultrasound Doppler of the legs for possible thromboembolic event leading to cardiac arrest.  However patient is not a good candidate for anticoagulation due to recent hematoma in the surgical site and we have to manage him conservatively.  If DVT is identified he will probably need IVC filter.  We will follow.    We will continue following him and make further recommendations.  Patient's family has been notified and they are on their way..  CODE STATUS: Full.  Overall prognosis: Guarded.  We will follow.    Tracy Gupta MD  Pulmonologist/Intensivist  4/16/2023 18:32 CDT

## 2023-04-16 NOTE — PROGRESS NOTES
I was called to the bedside to evaluate the patient.  He was panicking and confused.  He was fighting with the nurses and became aggressive.  He was given 20 mg of Geodon without significant effect.  He was then given 1 mg of Ativan IV also without effect.  He was subsequently administered 2 mg of morphine which calmed the patient to the point of loss of respirations.  Patient became bradycardic and CODE BLUE was subsequently initiated.  Dr. Chavez successfully intubated the patient and ROSC was achieved after 10 minutes of resuscitation.  The patient received 4 doses of epinephrine and a single dose of bicarbonate during the code process.  Dr. Gupta and Dr. Portillo were notified and both came to see the patient.  The patient will be sedated with propofol.  Chest x-ray will be obtained for ET tube placement.  An OG tube will be placed as well.  ABGs will be obtained in 1 hour.  Chest x-ray will be ordered daily.    Electronically signed by Ramin Singh DO, 04/16/23, 18:57 CDT.

## 2023-04-16 NOTE — PROGRESS NOTES
[facility] Medicine Services  INPATIENT PROGRESS NOTE    Patient Name: Elijah Escobar  Date of Admission: 3/31/2023  Today's Date: 04/16/23  Length of Stay: 16  Primary Care Physician: Brianna Martinez APRN    Subjective   Chief Complaint: Shortness of breath, increased confusion  HPI     Patient was transferred to the intensive care unit last night secondary to hypoxia and confusion.  Currently, the patient is on 12 L high flow nasal O2.  Repeat chest x-ray this a.m. reveals evidence of congestive failure with interstitial pulmonary edema and small effusions.  Concern is for possible pulmonary hypertension and exacerbation of right heart failure given the patient's left ventricular EF of 46 to 50% and RV pressures elevated 45 mmHg.    Review of Systems   All pertinent negatives and positives are as above. All other systems have been reviewed and are negative unless otherwise stated.     Objective    Temp:  [97.8 °F (36.6 °C)-98.1 °F (36.7 °C)] 98 °F (36.7 °C)  Heart Rate:  [] 86  Resp:  [14-24] 18  BP: (113-161)/() 139/86  Physical Exam  Constitutional:       Appearance: He is obese. He is slowed and confused.      Comments:  No distress.  No family present.   HENT:      Head: Normocephalic.      Comments: Postop dressing around head removed.  Drain present.  Eyes:      Conjunctiva/sclera: Conjunctivae normal.    Cardiovascular:      Rate and Rhythm: Normal rate. Rhythm irregularly irregular and variable at 120s.      Heart sounds: Normal heart sounds.   Pulmonary:      Effort: Pulmonary effort is normal. No respiratory distress.      Breath sounds: No wheezing or rales.   Abdominal:      General: Bowel sounds are normal. There is no distension.      Palpations: Abdomen is soft.      Tenderness: There is no abdominal tenderness.   Musculoskeletal:         General: Swelling (trace) present. No tenderness or deformity. Normal range of motion.   Skin:     General:  Skin is warm and dry.      Findings: No rash.   Neurological:      Mental Status: He is alert and oriented to person, place, and time.       Motor: No abnormal muscle tone.   Psychiatric:         Mood and Affect: Mood normal.         Behavior: Behavior normal.         Thought Content: Thought content abnormal with slow responses.  Disoriented x3.    Results Review:  I have reviewed the labs, radiology results, and diagnostic studies.    Laboratory Data:   Results from last 7 days   Lab Units 04/12/23  0939   WBC 10*3/mm3 10.00   HEMOGLOBIN g/dL 11.1*   HEMATOCRIT % 36.1*   PLATELETS 10*3/mm3 209        Results from last 7 days   Lab Units 04/12/23  0939   SODIUM mmol/L 142   POTASSIUM mmol/L 4.1   CHLORIDE mmol/L 105   CO2 mmol/L 27.0   BUN mg/dL 16   CREATININE mg/dL 0.94   CALCIUM mg/dL 8.0*   BILIRUBIN mg/dL 0.4   ALK PHOS U/L 87   ALT (SGPT) U/L 29   AST (SGOT) U/L 25   GLUCOSE mg/dL 135*       Culture Data:   No results found for: BLOODCX, URINECX, WOUNDCX, MRSACX, RESPCX, STOOLCX    Radiology Data:   Imaging Results (Last 24 Hours)     Procedure Component Value Units Date/Time    XR Chest 1 View [[id number]] Collected: 04/16/23 0647     Updated: 04/16/23 0651    Narrative:      EXAMINATION: Chest 1 view 04/15/2023     HISTORY: Shortness of breath.     FINDINGS: Today's exam is compared to previous study of 10 days earlier.  There is evidence of congestive heart failure with vascular  redistribution and interstitial pulmonary edema. Small effusions are  present blunting the costophrenic angles.       Impression:      1.. Congestive failure with interstitial pulmonary edema and small  effusions.  This report was finalized on 04/16/2023 06:48 by Dr. Zack Mendoza MD.          I have reviewed the patient's current medications.     Assessment/Plan   Assessment  Active Hospital Problems    Diagnosis    • **Swelling of scalp    • COPD (chronic obstructive pulmonary disease)    • Obesity (BMI 30-39.9)    • Tobacco  abuse, in remission    • Acute systolic heart failure    • ADDIE (obstructive sleep apnea)    • Type 2 myocardial infarction due to arrhythmia    • Acute pulmonary edema due to A-fib RVR    • Acute respiratory failure with hypoxia    • Intracranial epidural hematoma    • Post-operative infection    • Paroxysmal atrial fibrillation with rapid ventricular response    • Type 2 diabetes mellitus with hyperglycemia and peripheral neuropathy, with long-term current use of insulin (HCC)    • Coronary artery disease    • Meningioma s/p craniotomy        Treatment Plan  Lasix 40 mg IV every 12 hours  Continue rate controlling medications  Pulmonology consultation regarding respiratory failure with high suspicion for pulmonary hypertension  Digoxin level ordered on 4/14 and remains pending.    Medical Decision Making   Number and Complexity of problems:   1) exacerbation of respiratory failure with hypoxia secondary to pulmonary edema, acute, high complexity  2) PAF with RVR, acute, moderate complexity  3) meningioma versus intracranial infection, acute, high complexity  4) type 2 diabetes, chronic, moderate complexity     Differential Diagnosis: Pulmonary embolus, LV dysfunction     Conditions and Status        Condition is unchanged.     [facility] Data  External documents reviewed: Care everywhere documentation  Cardiac tracing (EKG, telemetry) interpretation: See HPI  Radiology interpretation: See HPI  Labs reviewed: See HPI  Any tests that were considered but not ordered: None     Decision rules/scores evaluated (example [id number], Wells, etc): [id number] score of 5 with a 7.2% risk of stroke annually     Discussed with: The patient and nursing     Care Planning  Shared decision making: The patient  Code status and discussions: Full code     Disposition  Social Determinants of Health that impact treatment or disposition: None  I expect the patient to be discharged by the primary service.    Electronically signed by Ramin  VELASQUEZ Singh DO, 04/16/23, 11:59 CDT.    I spent 45 minutes providing critical care management to this patient. This excludes time spent in performing separately billed procedures.

## 2023-04-17 PROBLEM — R79.89 ELEVATED LIVER FUNCTION TESTS: Status: ACTIVE | Noted: 2023-01-01

## 2023-04-17 NOTE — PROGRESS NOTES
RT EQUIPMENT DEVICE RELATED - SKIN ASSESSMENT    RT Medical Equipment/Device:     ETT Martin/Anchorfast    Skin Assessment:      Nose:  Intact    Device Skin Pressure Protection:  Skin-to-device areas padded:  Ingrid    Nurse Notification:  Kylie Briggs, RRT

## 2023-04-17 NOTE — CASE MANAGEMENT/SOCIAL WORK
Continued Stay Note   [place]     Patient Name: Elijah Escobar  MRN: [id number]  Today's Date: 4/17/2023    Admit Date: 3/31/2023    Plan: LTAC?   Discharge Plan     Row Name 04/17/23 1050       Plan    Plan LTAC?    Plan Comments Patient now on vent.  Not ready for SNF placement at this time.  Patient may need LTAC?  However, his insurance would have to approve.               Discharge Codes    No documentation.               Expected Discharge Date and Time     Expected Discharge Date Expected Discharge Time    Apr 17, 2023             TIANA Syed

## 2023-04-17 NOTE — SIGNIFICANT NOTE
04/17/23 0602   Readings   Plateau Pressure (cm H2O) 19 cm H2O   Static Compliance (L/cm H2O) 49

## 2023-04-17 NOTE — PROGRESS NOTES
RT EQUIPMENT DEVICE RELATED - SKIN ASSESSMENT    RT Medical Equipment/Device:     ETT Martin/Anchorfast    Skin Assessment:      Cheek:  Intact  Lips:  Intact    Device Skin Pressure Protection:  Skin-to-device areas padded:  Anchorfast    Nurse Notification:  Kylie Condon, CRT

## 2023-04-17 NOTE — PROCEDURES
Insert Central Line At Bedside    Date/Time: 4/16/2023 7:11 PM  Performed by: Adelso Chavez MD  Authorized by: Adelso Chavez MD   Consent: The procedure was performed in an emergent situation.  Indications: vascular access and central pressure monitoring    Sedation:  Patient sedated: no    Preparation: skin prepped with 2% chlorhexidine  Skin prep agent dried: skin prep agent completely dried prior to procedure  Sterile barriers: all five maximum sterile barriers used - cap, mask, sterile gown, sterile gloves, and large sterile sheet  Hand hygiene: hand hygiene performed prior to central venous catheter insertion  Location details: right internal jugular  Patient position: Trendelenburg  Catheter type: triple lumen  Catheter size: 7 Fr  Pre-procedure: landmarks identified  Ultrasound guidance: yes  Number of attempts: 2  Successful placement: yes  Post-procedure: line sutured and dressing applied  Assessment: blood return through all ports  Patient tolerance: patient tolerated the procedure well with no immediate complications

## 2023-04-17 NOTE — PROCEDURES
Intubation    Date/Time: 4/16/2023 7:07 PM  Performed by: Adelso Chavez MD  Authorized by: Adelso Chavez MD   Consent: The procedure was performed in an emergent situation.  Indications: respiratory distress,  respiratory failure and  airway protection  Intubation method: video-assisted  Patient status: unconscious  Laryngoscope size: Mac 4  Tube size: 7.5 mm  Tube type: cuffed  Number of attempts: 2  Cricoid pressure: yes  Cords visualized: yes  Post-procedure assessment: chest rise and CO2 detector  Breath sounds: equal  Cuff inflated: yes  Tube secured with: ETT carter  Chest x-ray interpreted by me.  Chest x-ray findings: endotracheal tube in appropriate position

## 2023-04-17 NOTE — PLAN OF CARE
Goal Outcome Evaluation:      Stable for shift, Levo and Propofol titrated multiple times during shift. Levo currently running at 0.06mcg/kg/hr, and Propofol at 50mcg/hr. CT of chest was obtained. Currently on 30% on vent. Restraints remain active, safety measures maintained. Family has been at bedside throughout the night and updated. Bilateral venous doppler scheduled today.

## 2023-04-17 NOTE — PROGRESS NOTES
Neurosurgery Daily Progress Note    HPI:  Elijah Escobar is a 67 y.o. male with a significant medical history of hypertension, diabetes, hyperlipidemia, seizures, craniotomy for tumor (6/16/2022), craniotomy for washout (7/1/2022), craniectomy (10/4/2022), coronary artery disease, diabetes, erectile dysfunction, GERD, hypertension, hyperlipidemia, tobacco abuse, and obesity.  He presents today with a complaint of a 4-5 days onset of progressively worsening right frontal, temporal, and periorbital edema and associated symptoms to include, but not limited to generalized fatigue, chills, dyspnea, intermittent nausea without vomiting, and states he's felt feverish.  Physical exam findings of neurologically intact with right frontal, temporal, and periorbital swelling.  WBC elevated at 15.  3 to an CRP elevated at 14.75.  Imaging pending.    Assessment:   Past Medical History:   Diagnosis Date   • Brain tumor    • Coronary artery disease    • COVID-19 vaccine series completed     MODERNA X 3; LAST DOSE 3/2022   • Diabetes    • Erectile dysfunction    • GERD (gastroesophageal reflux disease)    • Hypercholesteremia    • Hypertension    • Seizure      Active Hospital Problems    Diagnosis    • **Swelling of scalp    • Elevated liver function tests    • COPD (chronic obstructive pulmonary disease)    • Obesity (BMI 30-39.9)    • Tobacco abuse, in remission    • Acute systolic heart failure    • ADDIE (obstructive sleep apnea)    • Type 2 myocardial infarction due to arrhythmia    • Acute pulmonary edema due to A-fib RVR    • Acute respiratory failure with hypoxia    • Intracranial epidural hematoma    • Post-operative infection    • Paroxysmal atrial fibrillation with rapid ventricular response    • Type 2 diabetes mellitus with hyperglycemia and peripheral neuropathy, with long-term current use of insulin (HCC)    • Coronary artery disease    • Meningioma s/p craniotomy      Plan:   Neuro: Limited exam secondary to  intubated on low-dose propofol.  Infected cranial flap      POD #12 (4/4/2023)  craniectomy and washout    Postop CT stable. No acute abnormalities.    Flap tapped I&D cultures 4+ gram-negative bacilli    Heavy 4+ Serratia marcescens     5 Days Post-Op (4/13/2023) evacuation of scalp hematoma    JUAN removed     Continue neuro exams per policy.  Call for decline  CT head today    CV: Code for PEA yesterday afternoon, 4/16/2023.  Required epinephrine and bicarb   Currently on Levophed   AFib/flutter, rate controlled.   Hold all anticoagulation for 2 weeks.   Appreciate cardiology  Pulm: Intubated on low-dose propofol   CT chest shows bilateral pleural effusions and interstitial pulmonary edema, no evidence of PE.     :  Voiding.   FEN: Tolerating carb consistent diet  Endocrine: Glucose improving.  Continue SSI  GI: SHERIF.  +BM  ID:  Infected cranial flap  CSF: no growth to date   PNA.  Resolved   UTI.  Resolved   Continue meropenem per ID.  Appreciate assist  Heme:  DVT prophylaxis with SCDs.  Hold all anticoagulation for 2 weeks.  Pain: No complaints at present  Dispo: Pending extubation     Chief complaint:   4-5 days onset of progressively worsening right frontal, temporal, and periorbital edema and associated symptoms to include, but not limited to generalized fatigue, chills, dyspnea, intermittent nausea without vomiting, and states he's felt feverish.    HPI  Subjective  Confusion and slurred speech    Temp:  [97.9 °F (36.6 °C)-98.4 °F (36.9 °C)] 97.9 °F (36.6 °C)  Heart Rate:  [] 92  Resp:  [0-30] 20  BP: ()/() 127/58  FiO2 (%):  [30 %-100 %] 30 %    Output by Drain (mL) 04/16/23 0701 - 04/16/23 1900 04/16/23 1901 - 04/17/23 0700 04/17/23 0701 - 04/17/23 0823 Range Total   Requested LDAs do not have output data documented.     Objective:  Vital signs: (most recent): Blood pressure 127/58, pulse 92, temperature 97.9 °F (36.6 °C), temperature source Axillary, resp. rate 20, height 177.8 cm  "(70\"), weight 123 kg (271 lb 2.7 oz), SpO2 98 %.      Neurologic Exam     Mental Status     Intubated on low-dose propofol     Cranial Nerves     CN III, IV, VI   Pupils are equal, round, and reactive to light.    CN IX, X   Right gag reflex: normal  Left gag reflex: normal    Motor Exam        Drains: * No LDAs found *    Imaging Results (Last 24 Hours)     Procedure Component Value Units Date/Time    XR Chest 1 View [[id number]] Collected: 04/17/23 0655     Updated: 04/17/23 0700    Narrative:      EXAM/TECHNIQUE: XR CHEST 1 VW-     INDICATION: Intubated and sedated patient     COMPARISON: 4/16/2023     FINDINGS:     Endotracheal tube is 4.5 cm above the robert. Enteric tube terminates  below the diaphragm out of the field of view. Resuscitation pads overlie  the chest. A right-sided CVL tip overlies the cavoatrial junction. No  change in hazy bibasilar opacities likely representing atelectasis  and/or small layering pleural effusions. Central vascular congestion and  mild perihilar interstitial opacity/edema is unchanged. No visible  pneumothorax.       Impression:         No change in appearance of the chest.  This report was finalized on 04/17/2023 06:57 by Dr. Mk Chinchilla MD.    CT Angiogram Chest [[id number]] Collected: 04/16/23 2156     Updated: 04/16/23 2216    Narrative:      Exam: CT angiogram of the of the chest with intravenous contrast.     CLINICAL HISTORY:  Unknown d-dimer. Shortness of breath.     DOSE:  282 mGycm. All CT scans are performed using dose optimization  techniques as appropriate to the performed exam and including at least  one of the following: Automated exposure control, adjustment of the mA  and/or kV according to size, and the use of the iterative reconstruction  technique.     TECHNIQUE: Contiguous axial images were obtained through the thorax  following intravenous contrast administration with reformatted images  obtained in the sagittal and coronal projections from the original " data  set. Three-dimensional reconstructions are also obtained.     COMPARISON:  04/03/2023     FINDINGS:     Pulmonary arteries:  There is normal enhancement of the pulmonary  arteries with no evidence of pulmonary thromboembolic disease.     Aorta:  The thoracic aorta and great vessels are remarkable for  atheromatous calcification including at the origin of the great vessels  with mild associated stenosis. No evidence of aneurysm or dissection.     LUNGS:  There are increasing bilateral pleural effusions with moderate  bilateral effusions now present with bilateral lower lobe atelectasis.  There is also patchy atelectasis in the upper lobes. There is an 11 mm  nodule within the right lower lobe abutting the major fissure not  definitely appreciated on the previous exam. This may be inflammatory in  nature. Groundglass opacity within both lungs are suggestive of  interstitial pulmonary edema. The patient is intubated with the  endotracheal tube well-positioned. An NG tube has been advanced with the  tip in the antrum of the stomach.     Pleural spaces: No pneumothorax is present. There are enlarging  bilateral pleural effusions.     HEART: There is mild cardiomegaly. No evidence of right ventricular  dysfunction. Heavy coronary artery calcifications are present. No  evidence of pericardial effusion.     Bones: No acute osseous abnormalities are demonstrated.     CHEST WALL: No chest wall abnormalities are demonstrated.     Lymph nodes: No enlarged mediastinal or axillary lymphadenopathy is  present.     Upper abdomen: Both adrenal glands are enlarged and nodular in  appearance likely related to adenomas. An NG tube is well-positioned. On  the lowermost cut of today's exam there is significant luminal stenosis  of the proximal SMA.       Impression:      1. Significant change from the previous exam of 13 days earlier with  increased bilateral pleural effusions. There is groundglass disease  within the lungs  suggesting interstitial pulmonary edema. There is  compressive atelectasis within both lower lobes as well as right middle  lobe and upper lobe atelectasis related to the effusions. There is  cardiomegaly with heavy coronary calcifications.  2. No evidence of pulmonary thromboembolic disease.  3. There is atheromatous calcification of the thoracic aorta and great  vessels without evidence of aneurysm or dissection. There is calcific  plaquing with mild associated stenosis within the proximal great vessels  at their origin.  4. Endotracheal tube and NG tube in place and are well-positioned.  5. Bilateral adrenal nodules likely representing adenomas.   6. On the lowermost cut of today's exam there is what appears to be  significant luminal stenosis of the proximal SMA.  This report was finalized on 04/16/2023 22:13 by Dr. Zack Mendoza MD.    XR Chest 1 View [[id number]] Collected: 04/16/23 1920     Updated: 04/16/23 1925    Narrative:      EXAMINATION: Chest 1 view 04/16/2023     HISTORY: Endotracheal tube placement     FINDINGS: Today's exam is compared to previous study one day earlier.  Defibrillator pads project over the heart. There is pulmonary venous  hypertension with interstitial edema. The patient is intubated with the  endotracheal tube tip approximately 5.5 cm above the robert. A right IJ  deep line has been placed with the tip at the cavoatrial juncture. An NG  tube has been advanced into the stomach.       Impression:      1. Multiple support lines in place all well-positioned.  2. Pulmonary venous hypertension with interstitial edema.  This report was finalized on 04/16/2023 19:21 by Dr. Zack Mendoza MD.        Lab Results (last 24 hours)     Procedure Component Value Units Date/Time    High Sensitivity Troponin T 2Hr [[id number]]  (Abnormal) Collected: 04/17/23 0627    Specimen: Blood Updated: 04/17/23 0701     HS Troponin T 376 ng/L      Troponin T Delta 25 ng/L     Narrative:      High  Sensitive Troponin T Reference Range:  <10.0 ng/L- Negative Female for AMI  <15.0 ng/L- Negative Male for AMI  >=10 - Abnormal Female indicating possible myocardial injury.  >=15 - Abnormal Male indicating possible myocardial injury.   Clinicians would have to utilize clinical acumen, EKG, Troponin, and serial changes to determine if it is an Acute Myocardial Infarction or myocardial injury due to an underlying chronic condition.         Blood Gas, Arterial - [[id number]]  (Abnormal) Collected: 04/17/23 0356    Specimen: Arterial Blood Updated: 04/17/23 0355     Site Right Radial     Marek's Test Positive     pH, Arterial 7.403 pH units      pCO2, Arterial 44.6 mm Hg      pO2, Arterial 115.0 mm Hg      Comment: 83 Value above reference range        HCO3, Arterial 27.8 mmol/L      Comment: 83 Value above reference range        Base Excess, Arterial 2.6 mmol/L      Comment: 83 Value above reference range        O2 Saturation, Arterial 99.1 %      Comment: 83 Value above reference range        Temperature 37.0 C      Barometric Pressure for Blood Gas 747 mmHg      Modality Ventilator     FIO2 50 %      Ventilator Mode AC     Set Tidal Volume 500     Set Mech Resp Rate 20.0     PEEP 8.0     Collected by [id number]     Comment: Meter: [id number]     :  [id number]        pCO2, Temperature Corrected 44.6 mm Hg      pH, Temp Corrected 7.403 pH Units      pO2, Temperature Corrected 115 mm Hg     High Sensitivity Troponin T [[id number]]  (Abnormal) Collected: 04/17/23 0308    Specimen: Blood Updated: 04/17/23 0347     HS Troponin T 351 ng/L     Narrative:      High Sensitive Troponin T Reference Range:  <10.0 ng/L- Negative Female for AMI  <15.0 ng/L- Negative Male for AMI  >=10 - Abnormal Female indicating possible myocardial injury.  >=15 - Abnormal Male indicating possible myocardial injury.   Clinicians would have to utilize clinical acumen, EKG, Troponin, and serial changes to determine if it is an Acute Myocardial  Infarction or myocardial injury due to an underlying chronic condition.         Comprehensive Metabolic Panel [[id number]]  (Abnormal) Collected: 04/17/23 0308    Specimen: Blood Updated: 04/17/23 0338     Glucose 165 mg/dL      BUN 15 mg/dL      Creatinine 1.44 mg/dL      Sodium 141 mmol/L      Potassium 5.3 mmol/L      Comment: Slight hemolysis detected by analyzer. Results may be affected.        Chloride 103 mmol/L      CO2 26.0 mmol/L      Calcium 8.0 mg/dL      Total Protein 5.4 g/dL      Albumin 3.0 g/dL      ALT (SGPT) 159 U/L      AST (SGOT) 170 U/L      Comment: Slight hemolysis detected by analyzer. Results may be affected.        Alkaline Phosphatase 102 U/L      Total Bilirubin 0.3 mg/dL      Globulin 2.4 gm/dL      A/G Ratio 1.3 g/dL      BUN/Creatinine Ratio 10.4     Anion Gap 12.0 mmol/L      eGFR 53.3 mL/min/1.73     Narrative:      GFR Normal >60  Chronic Kidney Disease <60  Kidney Failure <15      CBC & Differential [[id number]]  (Abnormal) Collected: 04/17/23 0308    Specimen: Blood Updated: 04/17/23 0316    Narrative:      The following orders were created for panel order CBC & Differential.  Procedure                               Abnormality         Status                     ---------                               -----------         ------                     CBC Auto Differential[[id number]]        Abnormal            Final result                 Please view results for these tests on the individual orders.    CBC Auto Differential [[id number]]  (Abnormal) Collected: 04/17/23 0308    Specimen: Blood Updated: 04/17/23 0316     WBC 17.15 10*3/mm3      RBC 3.18 10*6/mm3      Hemoglobin 9.7 g/dL      Hematocrit 31.1 %      MCV 97.8 fL      MCH 30.5 pg      MCHC 31.2 g/dL      RDW 13.3 %      RDW-SD 47.7 fl      MPV 10.3 fL      Platelets 227 10*3/mm3      Neutrophil % 83.8 %      Lymphocyte % 10.6 %      Monocyte % 4.9 %      Eosinophil % 0.0 %      Basophil % 0.2 %      Immature Grans % 0.5 %       Neutrophils, Absolute 14.37 10*3/mm3      Lymphocytes, Absolute 1.82 10*3/mm3      Monocytes, Absolute 0.84 10*3/mm3      Eosinophils, Absolute 0.00 10*3/mm3      Basophils, Absolute 0.03 10*3/mm3      Immature Grans, Absolute 0.09 10*3/mm3      nRBC 0.0 /100 WBC     Basic Metabolic Panel [[id number]]  (Abnormal) Collected: 04/16/23 2320    Specimen: Blood Updated: 04/17/23 0008     Glucose 201 mg/dL      BUN 14 mg/dL      Creatinine 1.38 mg/dL      Sodium 140 mmol/L      Potassium 5.3 mmol/L      Chloride 102 mmol/L      CO2 25.0 mmol/L      Calcium 7.7 mg/dL      BUN/Creatinine Ratio 10.1     Anion Gap 13.0 mmol/L      eGFR 56.0 mL/min/1.73     Narrative:      GFR Normal >60  Chronic Kidney Disease <60  Kidney Failure <15      Magnesium [[id number]]  (Normal) Collected: 04/16/23 2320    Specimen: Blood Updated: 04/17/23 0008     Magnesium 2.1 mg/dL     C-reactive Protein [[id number]]  (Abnormal) Collected: 04/16/23 2320    Specimen: Blood Updated: 04/17/23 0004     C-Reactive Protein 1.39 mg/dL     Sedimentation Rate [[id number]]  (Normal) Collected: 04/16/23 2320    Specimen: Blood Updated: 04/16/23 2348     Sed Rate 20 mm/hr     POC Glucose Once [[id number]]  (Abnormal) Collected: 04/16/23 2023    Specimen: Blood Updated: 04/16/23 2036     Glucose 164 mg/dL      Comment: : [id number] [facility] [id number]       Blood Gas, Arterial - [[id number]]  (Abnormal) Collected: 04/16/23 2006    Specimen: Arterial Blood Updated: 04/16/23 2005     Site Left Radial     Marek's Test Positive     pH, Arterial 7.354 pH units      pCO2, Arterial 46.9 mm Hg      Comment: 83 Value above reference range        pO2, Arterial 113.0 mm Hg      Comment: 83 Value above reference range        HCO3, Arterial 26.1 mmol/L      Comment: 83 Value above reference range        Base Excess, Arterial 0.3 mmol/L      O2 Saturation, Arterial 98.1 %      Temperature 37.0 C      Barometric Pressure for Blood Gas 748 mmHg       Modality Ventilator     FIO2 100 %      Ventilator Mode AC     Set Tidal Volume 500     Set Mech Resp Rate 20.0     PEEP 8.0     Collected by [id number]     Comment: Meter: [id number]     :  [id number]        pCO2, Temperature Corrected 46.9 mm Hg      pH, Temp Corrected 7.354 pH Units      pO2, Temperature Corrected 113 mm Hg     POC Glucose Once [[id number]]  (Abnormal) Collected: 04/16/23 1652    Specimen: Blood Updated: 04/16/23 1704     Glucose 146 mg/dL      Comment: : leilani Marek Bahena ID: [id number]       Urinalysis, Microscopic Only - Straight Cath [[id number]]  (Abnormal) Collected: 04/16/23 1437    Specimen: Urine from Straight Cath Updated: 04/16/23 1602     RBC, UA 0-2 /HPF      WBC, UA 3-5 /HPF      Comment: Urine culture not indicated.        Bacteria, UA None Seen /HPF      Squamous Epithelial Cells, UA 0-2 /HPF      Hyaline Casts, UA 3-6 /LPF      Methodology Automated Microscopy    Urinalysis With Culture If Indicated - Straight Cath [[id number]]  (Abnormal) Collected: 04/16/23 1437    Specimen: Urine from Straight Cath Updated: 04/16/23 1602     Color, UA Dark Yellow     Appearance, UA Clear     pH, UA 5.5     Specific Gravity, UA 1.018     Glucose, UA Negative     Ketones, UA 15 mg/dL (1+)     Bilirubin, UA Negative     Blood, UA Negative     Protein, UA 30 mg/dL (1+)     Leuk Esterase, UA Negative     Nitrite, UA Negative     Urobilinogen, UA 0.2 E.U./dL    Narrative:      In absence of clinical symptoms, the presence of pyuria, bacteria, and/or nitrites on the urinalysis result does not correlate with infection.    BNP [[id number]]  (Abnormal) Collected: 04/16/23 1210    Specimen: Blood Updated: 04/16/23 1235     proBNP 10,944.0 pg/mL     Narrative:      Among patients with dyspnea, NT-proBNP is highly sensitive for the detection of acute congestive heart failure. In addition NT-proBNP of <300 pg/ml effectively rules out acute congestive heart failure with 99% negative  "predictive value.      D-dimer, Quantitative [[id number]]  (Abnormal) Collected: 04/16/23 1210    Specimen: Blood Updated: 04/16/23 1230     D-Dimer, Quantitative 0.76 MCGFEU/mL     Narrative:      According to the assay 's published package insert, a normal (<0.50 MCGFEU/mL) D-dimer result in conjunction with a non-high clinical probability assessment, excludes deep vein thrombosis (DVT) and pulmonary embolism (PE) with high sensitivity.    D-dimer values increase with age and this can make VTE exclusion of an older population difficult. To address this, the [employer], based on best available evidence and recent guidelines, recommends that clinicians use age-adjusted D-dimer thresholds in patients greater than 50 years of age with: a) a low probability of PE who do not meet all Pulmonary Embolism Rule Out Criteria, or b) in those with intermediate probability of PE.   The formula for an age-adjusted D-dimer cut-off is \"age/100\".  For example, a 60 year old patient would have an age-adjusted cut-off of 0.60 MCGFEU/mL and an 80 year old 0.80 MCGFEU/mL.    POC Glucose Once [[id number]]  (Normal) Collected: 04/16/23 1141    Specimen: Blood Updated: 04/16/23 1153     Glucose 121 mg/dL      Comment: : leilani Bhaena ID: [id number]           ADITYA Cespedes    "

## 2023-04-17 NOTE — PROGRESS NOTES
[facility] [place] - [facility]    Patient: Elijah Escobar    1955    MR# [id number]    Acct# [id number]  04/17/23   06:37 CDT  Referring Provider: Flaco Portillo, *    Chief Complaint: Mechanically ventilated    Interval history: Afebrile.  Intubated and sedated yesterday for worsening respiratory status following morphine.  CT angiogram did not show PE.  Bilateral lung infiltrates and pleural effusions noted.  Saturation currently 98 on PEEP of 8 and FiO2 0.3.  Airway pressure 25.  He is passively ventilating.  Vent rate reduced to 18.  PEEP reduced to 5.  BNP elevated.  Echo on April 1 showing an EF of 46-50. Creatinine remains 1.4.  Potassium 5.3.  White count trending up.  He remains on Merrem.  Levophed infusing.  Current MAP, 77.     Meds:  aspirin, 81 mg, Oral, Daily  atorvastatin, 20 mg, Oral, Nightly  budesonide, 0.5 mg, Nebulization, BID - RT  Chlorhexidine Gluconate Cloth, 1 application, Topical, Q24H  digoxin, 250 mcg, Oral, Daily  dilTIAZem CD, 300 mg, Oral, Q24H  furosemide, 40 mg, Intravenous, BID  gabapentin, 300 mg, Oral, TID  insulin detemir, 10 Units, Subcutaneous, Q12H  insulin lispro, 0-14 Units, Subcutaneous, TID AC  levETIRAcetam, 500 mg, Oral, BID  meropenem, 1 g, Intravenous, Q8H  methylnaltrexone, 12 mg, Subcutaneous, Every Other Day  metoclopramide, 10 mg, Oral, 4x Daily AC & at Bedtime  metoprolol tartrate, 50 mg, Oral, Q12H  multivitamin with minerals, 1 tablet, Oral, Daily  pantoprazole, 40 mg, Intravenous, Q AM  polyethylene glycol, 17 g, Oral, Daily  potassium chloride, 20 mEq, Oral, Daily  sennosides-docusate, 1 tablet, Oral, Daily  sodium chloride, 10 mL, Intravenous, Q12H  sucralfate, 1 g, Oral, 4x Daily AC & at Bedtime      norepinephrine, 0.02-0.3 mcg/kg/min, Last Rate: 0.06 mcg/kg/min (04/17/23 0330)  propofol, 5-50 mcg/kg/min, Last Rate: 50 mcg/kg/min (04/17/23 0625)  vasopressin, 0.03 Units/min, Last Rate: Stopped  (04/16/23 2027)        Ventilator Settings:        Resp Rate (Set): 20     FiO2 (%): 30 %  PEEP/CPAP (cm H2O): 8 cm H20  Minute Ventilation (L/min) (Obs): 10.5 L/min  Resp Rate (Observed) Vent: 20     I:E Ratio (Obs): 1:2.6  PIP Observed (cm H2O): 25 cm H2O     Physical Exam:  Temp:  [98 °F (36.7 °C)-98.4 °F (36.9 °C)] 98.3 °F (36.8 °C)  Heart Rate:  [] 76  Resp:  [0-30] 20  BP: ()/() 118/52  FiO2 (%):  [30 %-100 %] 30 %  Intake/Output Summary (Last 24 hours) at 4/17/2023 0637  Last data filed at 4/17/2023 0400  Gross per 24 hour   Intake 766.6 ml   Output 2070 ml   Net -1303.4 ml     SpO2 Percentage    04/17/23 0554 04/17/23 0600 04/17/23 0607   SpO2: 99% 97% 98%   Body mass index is 38.91 kg/m².   Physical Exam   Constitutional:       Appearance: He is obese. He is ill-appearing.  Intubated and sedated  HENT:      Head: Normocephalic. Right scalp wound, well-healed, no redness warmth or drainage     Right Ear: External ear normal.      Left Ear: External ear normal.      Nose: Nose normal.      Mouth/Throat: ETT/OG/bite-block  Eyes:      General:         Right eye: No discharge.         Left eye: No discharge.      Conjunctiva/sclera: Conjunctivae normal.      Pupils: Pupils are equal, round, and reactive to light.   Cardiovascular:      Rate and Rhythm: Rhythm irregular, rate 82     Heart sounds: No murmur heard.   Pulmonary:      Effort: Pulmonary effort is normal. No respiratory distress.      Breath sounds: No wheezing.   Abdominal:      General: Abdomen is flat. Bowel sounds are normal. There is no distension.      Palpations: Abdomen is soft. There is no mass.   Musculoskeletal:      Cervical back: Neck supple.      Right lower leg: No edema.      Left lower leg: No edema.   Skin:     General: Skin is warm and dry.      Coloration: Skin is not jaundiced or pale.   Neurological:      General: Intubated and sedated     Electronically signed by ADITYA Olivera, 4/17/2023, 06:37 CDT       Physician substantive portion: medical decision making  Result Review  Laboratory Data:  Results from last 7 days   Lab Units 04/17/23  0308 04/12/23  0939   WBC 10*3/mm3 17.15* 10.00   HEMOGLOBIN g/dL 9.7* 11.1*   PLATELETS 10*3/mm3 227 209     Results from last 7 days   Lab Units 04/17/23  0308 04/16/23  2320 04/12/23  0948 04/12/23  0939   SODIUM mmol/L 141 140  --  142   POTASSIUM mmol/L 5.3* 5.3*  --  4.1   CO2 mmol/L 26.0 25.0  --  27.0   BUN mg/dL 15 14  --  16   CREATININE mg/dL 1.44* 1.38*  --  0.94   INR   --   --  1.22*  --    CRP mg/dL  --  1.39*  --   --      Results from last 7 days   Lab Units 04/17/23  0356 04/16/23  2006 04/15/23  2308   PH, ARTERIAL pH units 7.403 7.354 7.411   PCO2, ARTERIAL mm Hg 44.6 46.9* 35.8   PO2 ART mm Hg 115.0* 113.0* 65.7*   FIO2 % 50 100  --      Microbiology Results (last 10 days)     ** No results found for the last 240 hours. **         Recent films:  CT Angiogram Chest    Result Date: 4/16/2023  Exam: CT angiogram of the of the chest with intravenous contrast.  CLINICAL HISTORY:  Unknown d-dimer. Shortness of breath.  DOSE:  282 mGycm. All CT scans are performed using dose optimization techniques as appropriate to the performed exam and including at least one of the following: Automated exposure control, adjustment of the mA and/or kV according to size, and the use of the iterative reconstruction technique.  TECHNIQUE: Contiguous axial images were obtained through the thorax following intravenous contrast administration with reformatted images obtained in the sagittal and coronal projections from the original data set. Three-dimensional reconstructions are also obtained.  COMPARISON:  04/03/2023  FINDINGS:  Pulmonary arteries:  There is normal enhancement of the pulmonary arteries with no evidence of pulmonary thromboembolic disease.  Aorta:  The thoracic aorta and great vessels are remarkable for atheromatous calcification including at the origin of the great  vessels with mild associated stenosis. No evidence of aneurysm or dissection.  LUNGS:  There are increasing bilateral pleural effusions with moderate bilateral effusions now present with bilateral lower lobe atelectasis. There is also patchy atelectasis in the upper lobes. There is an 11 mm nodule within the right lower lobe abutting the major fissure not definitely appreciated on the previous exam. This may be inflammatory in nature. Groundglass opacity within both lungs are suggestive of interstitial pulmonary edema. The patient is intubated with the endotracheal tube well-positioned. An NG tube has been advanced with the tip in the antrum of the stomach.  Pleural spaces: No pneumothorax is present. There are enlarging bilateral pleural effusions.  HEART: There is mild cardiomegaly. No evidence of right ventricular dysfunction. Heavy coronary artery calcifications are present. No evidence of pericardial effusion.  Bones: No acute osseous abnormalities are demonstrated.  CHEST WALL: No chest wall abnormalities are demonstrated.  Lymph nodes: No enlarged mediastinal or axillary lymphadenopathy is present.  Upper abdomen: Both adrenal glands are enlarged and nodular in appearance likely related to adenomas. An NG tube is well-positioned. On the lowermost cut of today's exam there is significant luminal stenosis of the proximal SMA.      Impression: 1. Significant change from the previous exam of 13 days earlier with increased bilateral pleural effusions. There is groundglass disease within the lungs suggesting interstitial pulmonary edema. There is compressive atelectasis within both lower lobes as well as right middle lobe and upper lobe atelectasis related to the effusions. There is cardiomegaly with heavy coronary calcifications. 2. No evidence of pulmonary thromboembolic disease. 3. There is atheromatous calcification of the thoracic aorta and great vessels without evidence of aneurysm or dissection. There is  calcific plaquing with mild associated stenosis within the proximal great vessels at their origin. 4. Endotracheal tube and NG tube in place and are well-positioned. 5. Bilateral adrenal nodules likely representing adenomas. 6. On the lowermost cut of today's exam there is what appears to be significant luminal stenosis of the proximal SMA. This report was finalized on 04/16/2023 22:13 by Dr. Zack Mendoza MD.    XR Chest 1 View    Result Date: 4/17/2023  EXAM/TECHNIQUE: XR CHEST 1 VW-  INDICATION: Intubated and sedated patient  COMPARISON: 4/16/2023  FINDINGS:  Endotracheal tube is 4.5 cm above the robert. Enteric tube terminates below the diaphragm out of the field of view. Resuscitation pads overlie the chest. A right-sided CVL tip overlies the cavoatrial junction. No change in hazy bibasilar opacities likely representing atelectasis and/or small layering pleural effusions. Central vascular congestion and mild perihilar interstitial opacity/edema is unchanged. No visible pneumothorax.      Impression:  No change in appearance of the chest. This report was finalized on 04/17/2023 06:57 by Dr. Mk Chinchilla MD.    XR Chest 1 View    Result Date: 4/16/2023  EXAMINATION: Chest 1 view 04/16/2023  HISTORY: Endotracheal tube placement  FINDINGS: Today's exam is compared to previous study one day earlier. Defibrillator pads project over the heart. There is pulmonary venous hypertension with interstitial edema. The patient is intubated with the endotracheal tube tip approximately 5.5 cm above the robert. A right IJ deep line has been placed with the tip at the cavoatrial juncture. An NG tube has been advanced into the stomach.      Impression: 1. Multiple support lines in place all well-positioned. 2. Pulmonary venous hypertension with interstitial edema. This report was finalized on 04/16/2023 19:21 by Dr. Zack Mendoza MD.    XR Chest 1 View    Result Date: 4/16/2023  EXAMINATION: Chest 1 view 04/15/2023   HISTORY: Shortness of breath.  FINDINGS: Today's exam is compared to previous study of 10 days earlier. There is evidence of congestive heart failure with vascular redistribution and interstitial pulmonary edema. Small effusions are present blunting the costophrenic angles.      Impression: 1.. Congestive failure with interstitial pulmonary edema and small effusions. This report was finalized on 04/16/2023 06:48 by Dr. Zack Mendoza MD.    XR Abdomen KUB    Result Date: 4/17/2023  EXAM/TECHNIQUE: XR ABDOMEN KUB-  INDICATION: OG Placement verification; S06.4X0A-Epidural hemorrhage without loss of consciousness, initial encounter; Z74.09-Other reduced mobility; Z98.890-Other specified postprocedural states; T81.42XA-Infection following a procedure, deep incisional surgical site, initial encounter; D32.9-Benign neoplasm of meninges, unspecified; E11.65-Type 2 diabetes mellitus with hyperglycemia; Z79.4-Long term (curren  COMPARISON: Same-day chest radiograph  FINDINGS:  Enteric tube is in the mid stomach. Visualized bowel gas pattern is nonobstructive in nature. Partially imaged cardiac resuscitation pads and atelectasis and/or small effusions in the lower chest. No acute osseous finding.      Impression:  Enteric tube tip is in the mid stomach. This report was finalized on 04/17/2023 10:06 by Dr. Mk Chinchilla MD.     Personal review of imaging: CXR shows Reviewed and it showed endotracheal tube in place and lines.  Bilateral vascular congestion noted with mild cardiomegaly and small effusions.      Pulmonary Assessment:  1. Acute on chronic hypoxic respiratory failure with increasing oxygen requirement  2. Bilateral pulm infiltrate likely congestive diastolic heart failure  3. Community-acquired pneumonia versus interstitial pneumonitis still on antibiotic  4. Pulmonary edema and volume overload  5. Lung nodules and chronic scarring in the lung  6. Atrial fibrillation on Cardizem now  7. COPD with history of  tobacco abuse  8. Meningioma s/p craniotomy.    9. S/p washout craniotomy and cranioplasty with flap infection  10. Altered mental status  11. Hypertension  12. Diabetes mellitus insulin-dependent  13. Coronary artery disease  14. Obesity likely obstructive sleep apnea  15. Seizure disorder  16. Chronic pain     Recommend/plan:   · Patient was seen in follow-up visit in CCU today.  Discussed with the RN  · He is on full assist-control volume control ventilation.  Oxygenation better  · PEEP is at 5 and FiO2 increased to 35%.  Continue current ventilator setting and propofol sedation  · Patient not ready for spontaneous breathing trial today.  Titrate PEEP and FiO2 to keep oxygen saturation more than 92%  · Continue antibiotic coverage diuresis for heart failure and monitor renal function electrolytes.  · Routine respiratory care and bronchodilator treatment.  Started on Pulmicort neb and DuoNeb  · CT of the chest angiogram did not show any PE there was pleural effusions noted in bilateral infiltrates noted  · DVT and stress ulcer was addressed.  Patient is unable to get any Lovenox due to risk of bleeding.  · He is getting a head CT today.  She had history of hematoma and craniotomy site earlier.  · Continue PT OT and start nutritional support.  Repeat labs imaging studies from time to time  · Patient is afebrile.  Currently on meropenem.  Review and adjust antibiotics depending on culture results.  · CODE STATUS: Full.  Overall prognosis: Guarded.  · We will follow.    This visit was performed by both a physician and an [facility] RN.  I personally evaluated and examined the patient.  I performed all aspects of the medical decision making as documented.    Electronically signed by     Tracy Gupta MD,  Pulmonologist/Intensivist   4/17/2023, 11:36 CDT

## 2023-04-17 NOTE — PROGRESS NOTES
[facility] Medicine Services  INPATIENT PROGRESS NOTE    Patient Name: Elijah Escobar  Date of Admission: 3/31/2023  Today's Date: 04/17/23  Length of Stay: 17  Primary Care Physician: Brianna Martinez APRN    Subjective   Chief Complaint: Patient is intubated, on ventilator and sedated.     HPI   No new issues per nursing.    IV   Propofol  Levophed  Merrem    Head CT ordered for this AM      Review of Systems   Intubated on ventilator     Objective    Temp:  [97.9 °F (36.6 °C)-98.4 °F (36.9 °C)] 97.9 °F (36.6 °C)  Heart Rate:  [] 92  Resp:  [0-30] 20  BP: ()/() 127/58  FiO2 (%):  [30 %-100 %] 30 %  Physical Exam  Vitals and nursing note reviewed.   Constitutional:       Comments: Sedated on Ventilator.   HENT:      Head:      Comments: Post surgical changes.      Right Ear: External ear normal.      Left Ear: External ear normal.      Nose: Nose normal.      Mouth/Throat:      Mouth: Mucous membranes are dry.   Eyes:      Conjunctiva/sclera: Conjunctivae normal.   Cardiovascular:      Rate and Rhythm: Normal rate and regular rhythm.      Pulses: Normal pulses.      Heart sounds: Normal heart sounds.   Pulmonary:      Comments: Lung sounds coarse bilaterally    R 20  FiO2 50  PEEP 5  Abdominal:      General: Bowel sounds are normal.      Palpations: Abdomen is soft.   Musculoskeletal:      Comments: Trace pitting edema bilateral lower extremities   Lymphadenopathy:      Cervical: No cervical adenopathy.   Skin:     General: Skin is warm and dry.   Neurological:      Comments: Sedated on ventilator   Psychiatric:      Comments: sedated             Results Review:  I have reviewed the labs, radiology results, and diagnostic studies.    Laboratory Data:   Results from last 7 days   Lab Units 04/17/23  0308 04/12/23  0939   WBC 10*3/mm3 17.15* 10.00   HEMOGLOBIN g/dL 9.7* 11.1*   HEMATOCRIT % 31.1* 36.1*   PLATELETS 10*3/mm3 227 209        Results from last  7 days   Lab Units 04/17/23  0308 04/16/23  2320 04/12/23  0939   SODIUM mmol/L 141 140 142   POTASSIUM mmol/L 5.3* 5.3* 4.1   CHLORIDE mmol/L 103 102 105   CO2 mmol/L 26.0 25.0 27.0   BUN mg/dL 15 14 16   CREATININE mg/dL 1.44* 1.38* 0.94   CALCIUM mg/dL 8.0* 7.7* 8.0*   BILIRUBIN mg/dL 0.3  --  0.4   ALK PHOS U/L 102  --  87   ALT (SGPT) U/L 159*  --  29   AST (SGOT) U/L 170*  --  25   GLUCOSE mg/dL 165* 201* 135*       Culture Data:   No results found for: BLOODCX, URINECX, WOUNDCX, MRSACX, RESPCX, STOOLCX    Radiology Data:   Imaging Results (Last 24 Hours)     Procedure Component Value Units Date/Time    XR Chest 1 View [[id number]] Collected: 04/17/23 0655     Updated: 04/17/23 0700    Narrative:      EXAM/TECHNIQUE: XR CHEST 1 VW-     INDICATION: Intubated and sedated patient     COMPARISON: 4/16/2023     FINDINGS:     Endotracheal tube is 4.5 cm above the robert. Enteric tube terminates  below the diaphragm out of the field of view. Resuscitation pads overlie  the chest. A right-sided CVL tip overlies the cavoatrial junction. No  change in hazy bibasilar opacities likely representing atelectasis  and/or small layering pleural effusions. Central vascular congestion and  mild perihilar interstitial opacity/edema is unchanged. No visible  pneumothorax.       Impression:         No change in appearance of the chest.  This report was finalized on 04/17/2023 06:57 by Dr. Mk Chinchilla MD.    CT Angiogram Chest [[id number]] Collected: 04/16/23 2156     Updated: 04/16/23 2216    Narrative:      Exam: CT angiogram of the of the chest with intravenous contrast.     CLINICAL HISTORY:  Unknown d-dimer. Shortness of breath.     DOSE:  282 mGycm. All CT scans are performed using dose optimization  techniques as appropriate to the performed exam and including at least  one of the following: Automated exposure control, adjustment of the mA  and/or kV according to size, and the use of the iterative  reconstruction  technique.     TECHNIQUE: Contiguous axial images were obtained through the thorax  following intravenous contrast administration with reformatted images  obtained in the sagittal and coronal projections from the original data  set. Three-dimensional reconstructions are also obtained.     COMPARISON:  04/03/2023     FINDINGS:     Pulmonary arteries:  There is normal enhancement of the pulmonary  arteries with no evidence of pulmonary thromboembolic disease.     Aorta:  The thoracic aorta and great vessels are remarkable for  atheromatous calcification including at the origin of the great vessels  with mild associated stenosis. No evidence of aneurysm or dissection.     LUNGS:  There are increasing bilateral pleural effusions with moderate  bilateral effusions now present with bilateral lower lobe atelectasis.  There is also patchy atelectasis in the upper lobes. There is an 11 mm  nodule within the right lower lobe abutting the major fissure not  definitely appreciated on the previous exam. This may be inflammatory in  nature. Groundglass opacity within both lungs are suggestive of  interstitial pulmonary edema. The patient is intubated with the  endotracheal tube well-positioned. An NG tube has been advanced with the  tip in the antrum of the stomach.     Pleural spaces: No pneumothorax is present. There are enlarging  bilateral pleural effusions.     HEART: There is mild cardiomegaly. No evidence of right ventricular  dysfunction. Heavy coronary artery calcifications are present. No  evidence of pericardial effusion.     Bones: No acute osseous abnormalities are demonstrated.     CHEST WALL: No chest wall abnormalities are demonstrated.     Lymph nodes: No enlarged mediastinal or axillary lymphadenopathy is  present.     Upper abdomen: Both adrenal glands are enlarged and nodular in  appearance likely related to adenomas. An NG tube is well-positioned. On  the lowermost cut of today's exam there is  significant luminal stenosis  of the proximal SMA.       Impression:      1. Significant change from the previous exam of 13 days earlier with  increased bilateral pleural effusions. There is groundglass disease  within the lungs suggesting interstitial pulmonary edema. There is  compressive atelectasis within both lower lobes as well as right middle  lobe and upper lobe atelectasis related to the effusions. There is  cardiomegaly with heavy coronary calcifications.  2. No evidence of pulmonary thromboembolic disease.  3. There is atheromatous calcification of the thoracic aorta and great  vessels without evidence of aneurysm or dissection. There is calcific  plaquing with mild associated stenosis within the proximal great vessels  at their origin.  4. Endotracheal tube and NG tube in place and are well-positioned.  5. Bilateral adrenal nodules likely representing adenomas.   6. On the lowermost cut of today's exam there is what appears to be  significant luminal stenosis of the proximal SMA.  This report was finalized on 04/16/2023 22:13 by Dr. Zack Mendoza MD.    XR Chest 1 View [[id number]] Collected: 04/16/23 1920     Updated: 04/16/23 1925    Narrative:      EXAMINATION: Chest 1 view 04/16/2023     HISTORY: Endotracheal tube placement     FINDINGS: Today's exam is compared to previous study one day earlier.  Defibrillator pads project over the heart. There is pulmonary venous  hypertension with interstitial edema. The patient is intubated with the  endotracheal tube tip approximately 5.5 cm above the robert. A right IJ  deep line has been placed with the tip at the cavoatrial juncture. An NG  tube has been advanced into the stomach.       Impression:      1. Multiple support lines in place all well-positioned.  2. Pulmonary venous hypertension with interstitial edema.  This report was finalized on 04/16/2023 19:21 by Dr. Zack Mendoza MD.          I have reviewed the patient's current medications.      Assessment/Plan   Assessment  Active Hospital Problems    Diagnosis    • **Swelling of scalp    • Elevated liver function tests    • COPD (chronic obstructive pulmonary disease)    • Obesity (BMI 30-39.9)    • Tobacco abuse, in remission    • Acute systolic heart failure    • ADDIE (obstructive sleep apnea)    • Type 2 myocardial infarction due to arrhythmia    • Acute pulmonary edema due to A-fib RVR    • Acute respiratory failure with hypoxia    • Intracranial epidural hematoma    • Post-operative infection    • Paroxysmal atrial fibrillation with rapid ventricular response    • Type 2 diabetes mellitus with hyperglycemia and peripheral neuropathy, with long-term current use of insulin (HCC)    • Coronary artery disease    • Meningioma s/p craniotomy    Worsening leukocytosis    Treatment Plan  Continue to monitor blood sugars, ss insulin  Repeat cmp and cbc in AM  CT abdomen pelvis. LFTs elevated  Continue Lasix    Medical Decision Making  Number and Complexity of problems:   1) exacerbation of respiratory failure with hypoxia secondary to pulmonary edema, acute, high complexity  2) PAF with RVR, acute, moderate complexity  3) meningioma versus intracranial infection, acute, high complexity  4) type 2 diabetes, chronic, moderate complexity  5) elevated liver enzymes acute moderate complexity  6) elevated WBC acute moderate complexity  Differential Diagnosis: Pulmonary embolus, LV dysfunction     Conditions and Status        Condition is unchanged.     [facility] Data  External documents reviewed: Care everywhere documentation  Cardiac tracing (EKG, telemetry) interpretation: See HPI  Radiology interpretation: See HPI  Labs reviewed: See HPI  Any tests that were considered but not ordered: None     Decision rules/scores evaluated (example [id number], [place], etc): [id number] score of 5 with a 7.2% risk of stroke annually     Discussed with: T nursing     Care Planning  Shared decision making: Patient is  sedated  Code status and discussions: full    Disposition  Social Determinants of Health that impact treatment or disposition: none  I expect the patient to be discharged by the primary service.     Electronically signed by Susanne Milligan, 04/17/23, 07:56 CDT.

## 2023-04-17 NOTE — PROGRESS NOTES
Infectious Diseases Progress Note    Patient:  Elijah Escobar  YOB: 1955  MRN: [id number]   Admit date: 3/31/2023   Admitting Physician: Flaco Portillo, *  Primary Care Physician: Brianna Martinez APRN    Chief Complaint/Interval History: Per discussion with nursing he was apparently found unresponsive yesterday on the [place].  He was found to be in PEA.  He required resuscitation with ACLS protocol including chest compression, doses of epinephrine and bicarbonate.  He is on low-dose Levophed currently but it is being weaned.  He is on 60% FiO2 with 5 of PEEP.  Oxygen saturation excellent.  He is on sedation currently.  He has not had a sedation holiday as of yet per discussion with nursing.    Intake/Output Summary (Last 24 hours) at 4/17/2023 1548  Last data filed at 4/17/2023 0700  Gross per 24 hour   Intake 1016.7 ml   Output 1600 ml   Net -583.3 ml     Allergies: No Known Allergies  Current Scheduled Medications:   aspirin, 81 mg, Oral, Daily  atorvastatin, 20 mg, Oral, Nightly  budesonide, 0.5 mg, Nebulization, BID - RT  Chlorhexidine Gluconate Cloth, 1 application, Topical, Q24H  digoxin, 250 mcg, Oral, Daily  dilTIAZem, 90 mg, Oral, Q8H  furosemide, 40 mg, Intravenous, BID  insulin detemir, 10 Units, Subcutaneous, Q12H  insulin lispro, 0-14 Units, Subcutaneous, TID AC  ipratropium, 0.5 mg, Nebulization, 4x Daily - RT  levETIRAcetam, 500 mg, Oral, BID  [START ON 4/21/2023] levoFLOXacin, 500 mg, Oral, Q24H  meropenem, 1 g, Intravenous, Q8H  methylnaltrexone, 12 mg, Subcutaneous, Every Other Day  metoclopramide, 10 mg, Oral, 4x Daily AC & at Bedtime  metoprolol tartrate, 50 mg, Oral, Q12H  multivitamin with minerals, 1 tablet, Oral, Daily  pantoprazole, 40 mg, Intravenous, Q AM  polyethylene glycol, 17 g, Oral, Daily  sennosides-docusate, 1 tablet, Oral, Daily  sodium chloride, 10 mL, Intravenous, Q12H  sucralfate, 1 g, Oral, 4x Daily AC & at Bedtime      Current PRN Medications:  •   "acetaminophen **OR** acetaminophen **OR** acetaminophen  •  butalbital-acetaminophen-caffeine  •  calcium carbonate  •  dextrose  •  dextrose  •  glucagon (human recombinant)  •  hydrOXYzine  •  ipratropium  •  ipratropium-albuterol  •  LORazepam  •  nicotine  •  ondansetron **OR** ondansetron  •  oxyCODONE-acetaminophen  •  oxyCODONE-acetaminophen  •  sodium chloride  •  sodium chloride    Review of Systems unobtainable from patient due to sedation and mechanical ventilation    Vital Signs:  /70   Pulse 66   Temp 98.4 °F (36.9 °C) (Axillary)   Resp 18   Ht 177.8 cm (70\")   Wt 123 kg (271 lb 2.7 oz)   SpO2 96%   BMI 38.91 kg/m²     Physical Exam  Vital signs - reviewed.  Line/IV site - No erythema, warmth, induration, or tenderness.  Craniotomy site without drainage  Lungs without wheezing  Abdomen soft and nondistended    Lab Results:  CBC:   Results from last 7 days   Lab Units 04/17/23  0308 04/12/23  0939   WBC 10*3/mm3 17.15* 10.00   HEMOGLOBIN g/dL 9.7* 11.1*   HEMATOCRIT % 31.1* 36.1*   PLATELETS 10*3/mm3 227 209     BMP:  Results from last 7 days   Lab Units 04/17/23  0308 04/16/23  2320 04/12/23  0939   SODIUM mmol/L 141 140 142   POTASSIUM mmol/L 5.3* 5.3* 4.1   CHLORIDE mmol/L 103 102 105   CO2 mmol/L 26.0 25.0 27.0   BUN mg/dL 15 14 16   CREATININE mg/dL 1.44* 1.38* 0.94   GLUCOSE mg/dL 165* 201* 135*   CALCIUM mg/dL 8.0* 7.7* 8.0*   ALT (SGPT) U/L 159*  --  29     Culture Results: No new results  Radiology:   CT scan of the abdomen and pelvis:  IMPRESSION:  1. Nonenhanced CT exam with artifact created from the nonelevated  overlying upper extremities. Nasogastric tube tip present in the distal  decompressed stomach. No evidence for bowel obstruction. Appendix  normal. No free air or abscess.  2. Jeong catheter within the bladder. Diffuse bladder wall thickening  may be due to underdistention, however cystitis and/or muscle wall  hypertrophy considered with prostate enlargement.  3. Moderate " atherosclerosis. No regional aneurysm.  4. Stranding of the subcutaneous tissues of the abdominal wall, most  notable in the right lower quadrant, correlation for possible cellulitis  recommended. Nonuniform soft tissue edema/anasarca considered.  This report was finalized on 04/17/2023 12:02 by Dr. Coleen Terry MD.    CT head done today:  IMPRESSION:  1. A Significant improvement in the right frontal lobe as detailed  above.  2. Right frontal craniotomy. Right frontal encephalomalacia stable since  the previous study.  This report was finalized on 04/17/2023 12:01 by Dr. Michelle Vigil MD.    CT angiogram of the chest done yesterday:  IMPRESSION:  1. Significant change from the previous exam of 13 days earlier with  increased bilateral pleural effusions. There is groundglass disease  within the lungs suggesting interstitial pulmonary edema. There is  compressive atelectasis within both lower lobes as well as right middle  lobe and upper lobe atelectasis related to the effusions. There is  cardiomegaly with heavy coronary calcifications.  2. No evidence of pulmonary thromboembolic disease.  3. There is atheromatous calcification of the thoracic aorta and great  vessels without evidence of aneurysm or dissection. There is calcific  plaquing with mild associated stenosis within the proximal great vessels  at their origin.  4. Endotracheal tube and NG tube in place and are well-positioned.  5. Bilateral adrenal nodules likely representing adenomas.   6. On the lowermost cut of today's exam there is what appears to be  significant luminal stenosis of the proximal SMA.  This report was finalized on 04/16/2023 22:13 by Dr. Zack Mendoza MD.    Additional Studies Reviewed:   2D echocardiogram April 1, 2023:  •  Left ventricular systolic function is mildly decreased. Left ventricular ejection fraction appears to be 46 - 50%.  •  Left ventricular wall thickness is consistent with mild to moderate concentric  hypertrophy.  •  The following left ventricular wall segments are hypokinetic: apical anterior, apical lateral, apical inferior, apical septal, apex hypokinetic and mid anteroseptal.  •  Left atrial volume is mildly increased.  •  Estimated right ventricular systolic pressure from tricuspid regurgitation is mildly elevated (35-45 mmHg).  •  Normal size and function the right ventricle.  •  No significant (greater than mild) valvular pathology.  •  Mild dilation of the aortic root is present.  •  Compared to prior exam from 6/19/2022, both studies were technically difficult, but upon my independent review of the previous exam, there did appear to be some mild apical hypokinesis present on that study that does look slightly more prominent on current exam.      Impression:   1.  Infection and cranioplasty site-Serratia marcescens  2.  Episode of PEA  3.  Pulmonary edema suggested on chest CT    Recommendations:   Continue treatment with meropenem  No new antibiotic recommendations at present  Continue to follow    Edwin Jeffers MD

## 2023-04-18 NOTE — SIGNIFICANT NOTE
04/18/23 0543   Readings   Plateau Pressure (cm H2O) 18 cm H2O   Driving Pressure (cm H2O) 12.8 cm H2O   Static Compliance (L/cm H2O) 24   Dynamic Compliance (L/cm H2O) 44 L/cm H2O

## 2023-04-18 NOTE — PROGRESS NOTES
Infectious Diseases Progress Note    Patient:  Elijah Escobar  YOB: 1955  MRN: [id number]   Admit date: 3/31/2023   Admitting Physician: Flaco Portillo, *  Primary Care Physician: Brianna Martinez APRN    Chief Complaint/Interval History: Remains on mechanical ventilation.  He is without fever currently.  Temperature last 24 hours 100.  Hemodynamically stable at present.    Intake/Output Summary (Last 24 hours) at 4/18/2023 1219  Last data filed at 4/18/2023 0800  Gross per 24 hour   Intake 974.47 ml   Output 1375 ml   Net -400.53 ml     Allergies: No Known Allergies  Current Scheduled Medications:   aspirin, 81 mg, Oral, Daily  atorvastatin, 20 mg, Oral, Nightly  budesonide, 0.5 mg, Nebulization, BID - RT  Chlorhexidine Gluconate Cloth, 1 application, Topical, Q24H  digoxin, 250 mcg, Oral, Daily  dilTIAZem, 90 mg, Oral, Q8H  furosemide, 40 mg, Intravenous, BID  insulin detemir, 10 Units, Subcutaneous, Q12H  insulin lispro, 0-14 Units, Subcutaneous, TID AC  ipratropium, 0.5 mg, Nebulization, 4x Daily - RT  levETIRAcetam, 500 mg, Oral, BID  [START ON 4/21/2023] levoFLOXacin, 500 mg, Oral, Q24H  meropenem, 1 g, Intravenous, Q8H  methylnaltrexone, 12 mg, Subcutaneous, Every Other Day  metoclopramide, 10 mg, Oral, 4x Daily AC & at Bedtime  metoprolol tartrate, 50 mg, Oral, Q12H  multivitamin with minerals, 1 tablet, Oral, Daily  pantoprazole, 40 mg, Intravenous, Q AM  polyethylene glycol, 17 g, Oral, Daily  sennosides-docusate, 1 tablet, Oral, Daily  sodium chloride, 10 mL, Intravenous, Q12H  sucralfate, 1 g, Oral, 4x Daily AC & at Bedtime      Current PRN Medications:  •  acetaminophen **OR** acetaminophen **OR** acetaminophen  •  butalbital-acetaminophen-caffeine  •  calcium carbonate  •  dextrose  •  dextrose  •  glucagon (human recombinant)  •  hydrOXYzine  •  ipratropium  •  ipratropium-albuterol  •  LORazepam  •  nicotine  •  ondansetron **OR** ondansetron  •  oxyCODONE-acetaminophen  •   "oxyCODONE-acetaminophen  •  sodium chloride  •  sodium chloride    Review of Systems see HPI    Vital Signs:  /63   Pulse 64   Temp 98.4 °F (36.9 °C) (Axillary)   Resp 16   Ht 177.8 cm (70\")   Wt 124 kg (274 lb 3.2 oz)   SpO2 95%   BMI 39.34 kg/m²     Physical Exam  Vital signs - reviewed.  Line/IV site - No erythema, warmth, induration, or tenderness.  Lungs crackles  Abdomen soft nondistended    Lab Results:  CBC:   Results from last 7 days   Lab Units 04/18/23  0413 04/17/23  0308 04/12/23  0939   WBC 10*3/mm3 16.70* 17.15* 10.00   HEMOGLOBIN g/dL 10.2* 9.7* 11.1*   HEMATOCRIT % 33.4* 31.1* 36.1*   PLATELETS 10*3/mm3 203 227 209     BMP:  Results from last 7 days   Lab Units 04/18/23  0233 04/17/23  0308 04/16/23  2320 04/12/23  0939   SODIUM mmol/L 141 141 140 142   POTASSIUM mmol/L 4.6 5.3* 5.3* 4.1   CHLORIDE mmol/L 103 103 102 105   CO2 mmol/L 28.0 26.0 25.0 27.0   BUN mg/dL 15 15 14 16   CREATININE mg/dL 1.49* 1.44* 1.38* 0.94   GLUCOSE mg/dL 70 165* 201* 135*   CALCIUM mg/dL 7.9* 8.0* 7.7* 8.0*   ALT (SGPT) U/L 106* 159*  --  29       Impression:   1.  Infection at cranioplasty site-Serratia  2.  Episode of PEA  3.  Pulmonary edema suggested on CT    Recommendations:   Continue treatment with meropenem  If temperature elevation or other new findings suggestive of infection repeat cultures and consider expanding antibiotic treatment  Continue to follow    Edwin Jeffers MD  "

## 2023-04-18 NOTE — CONSULTS
Nutrition Services    Patient Name:  Elijah Escobar  YOB: 1955  MRN: [id number]  Admit Date:  3/31/2023    Nutrition consult received to start enteral nutrition via OG tube. Pt is on vent support and receiving propofol for sedation. Propofol is providing 974 calories at current rate.     Recommend to start Peptamen Intense VHP at 25mL/hour. Increase rate by 10mL every 12 hours as tolerated to a goal rate of 45mL/hour. Free water flush of 25mL every hour via pump.    TF, with propofol calories, will provide 88% of est'd calorie needs and 101% of est'd protein needs. Will follow and make adjustments as necessary.    Electronically signed by:  Pina Whitt RDN, RIGO  04/18/23 09:49 CDT

## 2023-04-18 NOTE — PROGRESS NOTES
[facility] [place] PULMONARY AND CRITICAL CARE PROGRESS NOTE - [facility]    Patient: Elijah Escobar    1955    MR# [id number]    Acct# [id number]  04/18/23   06:55 CDT  Referring Provider: Flaco Portillo, *    Chief Complaint: Mechanically ventilated    Interval history: Afebrile.  Sedated on propofol.  Saturation 96 on PEEP of 5 and FiO2 0.85.  Nursing at bedside.  They reported he did require brief assistance with oxygenation via bagging.  He was easy to bag.  There were not copious secretions.  They increased his FiO2 to 1.0 temporarily.  They have been slowly weaning it back down.  Turned down to 0.7 at bedside.  He is off Levophed.  ABGs adequate.  X-ray still showing bilateral pleural effusions.  He is on Lasix 40 twice daily.     Discussed with nursing at bedside.  If he tolerates FiO2 0.7 we will attempt to reduce to 0.6.  If he desaturates on 0.6, turn PEEP up to 7.5.    Meds:  aspirin, 81 mg, Oral, Daily  atorvastatin, 20 mg, Oral, Nightly  budesonide, 0.5 mg, Nebulization, BID - RT  Chlorhexidine Gluconate Cloth, 1 application, Topical, Q24H  digoxin, 250 mcg, Oral, Daily  dilTIAZem, 90 mg, Oral, Q8H  furosemide, 40 mg, Intravenous, BID  insulin detemir, 10 Units, Subcutaneous, Q12H  insulin lispro, 0-14 Units, Subcutaneous, TID AC  ipratropium, 0.5 mg, Nebulization, 4x Daily - RT  levETIRAcetam, 500 mg, Oral, BID  [START ON 4/21/2023] levoFLOXacin, 500 mg, Oral, Q24H  meropenem, 1 g, Intravenous, Q8H  methylnaltrexone, 12 mg, Subcutaneous, Every Other Day  metoclopramide, 10 mg, Oral, 4x Daily AC & at Bedtime  metoprolol tartrate, 50 mg, Oral, Q12H  multivitamin with minerals, 1 tablet, Oral, Daily  pantoprazole, 40 mg, Intravenous, Q AM  polyethylene glycol, 17 g, Oral, Daily  sennosides-docusate, 1 tablet, Oral, Daily  sodium chloride, 10 mL, Intravenous, Q12H  sucralfate, 1 g, Oral, 4x Daily AC & at Bedtime      norepinephrine, 0.02-0.3 mcg/kg/min, Last Rate: Stopped (04/17/23  2005)  propofol, 5-50 mcg/kg/min, Last Rate: 50 mcg/kg/min (04/18/23 0527)  vasopressin, 0.03 Units/min, Last Rate: Stopped (04/16/23 2027)        Ventilator Settings:        Resp Rate (Set): 18     FiO2 (%): 65 %  PEEP/CPAP (cm H2O): 5 cm H20  Minute Ventilation (L/min) (Obs): 9.55 L/min  Resp Rate (Observed) Vent: 18     I:E Ratio (Obs): 1:3  PIP Observed (cm H2O): 22 cm H2O     Physical Exam:  Temp:  [97.9 °F (36.6 °C)-100 °F (37.8 °C)] 99.3 °F (37.4 °C)  Heart Rate:  [57-92] 69  Resp:  [18-22] 18  BP: ()/() 109/61  FiO2 (%):  [30 %-80 %] 65 %  Intake/Output Summary (Last 24 hours) at 4/18/2023 0655  Last data filed at 4/18/2023 0559  Gross per 24 hour   Intake 1160.64 ml   Output 1555 ml   Net -394.36 ml     SpO2 Percentage    04/18/23 0545 04/18/23 0552 04/18/23 0600   SpO2: 93% 94% 94%   Body mass index is 39.34 kg/m².   Physical Exam   Physical Exam   Constitutional:       Appearance: He is obese. He is ill-appearing.  Intubated and sedated  HENT:      Head: Normocephalic. Right scalp wound, well-healed, no redness warmth or drainage     Right Ear: External ear normal.      Left Ear: External ear normal.      Nose: Nose normal.      Mouth/Throat: ETT/OG  Eyes:      General:         Right eye: No discharge.         Left eye: No discharge.      Conjunctiva/sclera: Conjunctivae normal.      Pupils: Pupils are equal, round, and reactive to light.   Cardiovascular:      Rate and Rhythm: Rhythm irregular, rate 67     Heart sounds: No murmur heard.   Pulmonary:      Effort: Pulmonary effort is normal. No respiratory distress.      Breath sounds: No wheezing.   Abdominal:      General: Abdomen is flat. Bowel sounds are normal. There is no distension.      Palpations: Abdomen is soft. There is no mass.   Musculoskeletal:      Cervical back: Neck supple.      Right lower leg: No edema.      Left lower leg: No edema.   Skin:     General: Skin is warm and dry.      Coloration: Skin is not jaundiced or pale.    Neurological:      General: Intubated and sedated        Electronically signed by ADITYA Olivera, 4/18/2023, 06:55 CDT      Physician substantive portion: medical decision making  Result Review  Laboratory Data:  Results from last 7 days   Lab Units 04/18/23  0413 04/17/23  0308 04/12/23  0939   WBC 10*3/mm3 16.70* 17.15* 10.00   HEMOGLOBIN g/dL 10.2* 9.7* 11.1*   PLATELETS 10*3/mm3 203 227 209     Results from last 7 days   Lab Units 04/18/23  0233 04/17/23  0308 04/16/23  2320 04/12/23  0948   SODIUM mmol/L 141 141 140  --    POTASSIUM mmol/L 4.6 5.3* 5.3*  --    CO2 mmol/L 28.0 26.0 25.0  --    BUN mg/dL 15 15 14  --    CREATININE mg/dL 1.49* 1.44* 1.38*  --    INR   --   --   --  1.22*   CRP mg/dL  --   --  1.39*  --      Results from last 7 days   Lab Units 04/18/23  0359 04/17/23  0356 04/16/23 2006   PH, ARTERIAL pH units 7.473* 7.403 7.354   PCO2, ARTERIAL mm Hg 41.9 44.6 46.9*   PO2 ART mm Hg 63.2* 115.0* 113.0*   FIO2 % 60 50 100     Microbiology Results (last 10 days)     ** No results found for the last 240 hours. **         Recent films:  CT Abdomen Pelvis Without Contrast    Result Date: 4/17/2023  CT ABDOMEN PELVIS WO CONTRAST- 4/17/2023 11:31 AM CDT  HISTORY: elevated lft; S06.4X0A-Epidural hemorrhage without loss of consciousness, initial encounter; Z74.09-Other reduced mobility; Z98.890-Other specified postprocedural states; T81.42XA-Infection following a procedure, deep incisional surgical site, initial encounter; D32.9-Benign neoplasm of meninges, unspecified; E11.65-Type 2 diabetes mellitus with hyperglycemia; Z79.4-Long term (current) use of ins  COMPARISON: None  DOSE LENGTH PRODUCT: 1783 mGy cm. Automated exposure control was also utilized to decrease patient radiation dose.  TECHNIQUE: Axial images of the abdomen pelvis are performed without IV contrast. Patient received IV contrast for CTA chest approximately 10 hours prior to the CT abdomen pelvis study, therefore no additional  IV contrast administered at this time. There is artifact associated with the nonelevated overlying upper extremities.  FINDINGS:  Please refer to CT chest report for discussion of pleural effusion, cardiomegaly, and basilar atelectasis.  The nonenhanced liver, spleen, pancreas are unremarkable. Gallbladder is moderately distended with no pericholecystic inflammation or biliary dilatation. Minimal prominence of the adrenal glands which maintain their adreniform shape without suspicious nodularity. Excreted contrast within the nondilated renal collecting systems. No abnormal perinephric fluid collection. Jeong catheter within the bladder. Bladder wall thickening may be due to underdistention, however finding can be seen with cystitis or muscle wall hypertrophy. The prostate is enlarged measuring 6 cm in width.  There is no free intraperitoneal air loculated abscess. There is a normal appendix. No small bowel dilatation. Nasogastric tube tip present within the gastric antrum. There is mild stranding of the subcutaneous fat of the anterior abdominal wall, greatest within the right lower abdominal wall. No loculated soft tissue abscess collection. Moderate dense diffuse vascular calcification with no regional aneurysm. No pathologic lymphadenopathy.  Degenerative changes greatest within the lower lumbar spine.      Impression: 1. Nonenhanced CT exam with artifact created from the nonelevated overlying upper extremities. Nasogastric tube tip present in the distal decompressed stomach. No evidence for bowel obstruction. Appendix normal. No free air or abscess. 2. Jeong catheter within the bladder. Diffuse bladder wall thickening may be due to underdistention, however cystitis and/or muscle wall hypertrophy considered with prostate enlargement. 3. Moderate atherosclerosis. No regional aneurysm. 4. Stranding of the subcutaneous tissues of the abdominal wall, most notable in the right lower quadrant, correlation for possible  cellulitis recommended. Nonuniform soft tissue edema/anasarca considered. This report was finalized on 04/17/2023 12:02 by Dr. Coleen Terry MD.    CT Head Without Contrast    Result Date: 4/17/2023  EXAMINATION: CT HEAD WO CONTRAST-   4/17/2023 11:31 AM CDT  HISTORY: change in mental status; S06.4X0A-Epidural hemorrhage without loss of consciousness, initial encounter; Z74.09-Other reduced mobility; Z98.890-Other specified postprocedural states; T81.42XA-Infection following a procedure, deep incisional surgical site, initial encounter; D32.9-Benign neoplasm of meninges, unspecified; E11.65-Type 2 diabetes mellitus with hyperglycemia; Z79.4-Long term (current)  In order to have a CT radiation dose as low as reasonably achievable Automated Exposure Control was utilized for adjustment of the mA and/or KV according to patient size.  DLP in mGycm= 453  The CT scan of the head is performed without contrast enhancement.  Images are acquired in axial plane and subsequent reconstruction in coronal and sagittal planes.  Comparison is made with the previous study dated 04/12/2023.  There is interval significant resolution of the right extra-axial heterogeneous density fluid accumulation/hematoma since the previous study. There is a small residual subgaleal heterogeneous density fluid with small amount of air/air bubbles is noted.  There is resolution of significant extrinsic pressure on the right frontal lobe and the right lateral ventricle anterior horn since the previous study.  Diffuse decreased attenuation of the right frontal lobe may suggest vasogenic edema. However there is no significant effacement of cortical sulci. This may partly be due to adjacent high density bone and high density extra-axial fluid.  There is resolution of the extensive impression on the anterior horn of the right lateral ventricle. There is no midline shift in the presence study.  An area of encephalomalacia in the right frontal lobe is  similar to the previous study. The gray-white matter differentiation is maintained the remaining brain.  Moderately prominent ventricles, basal cisterns and cortical sulci suggesting moderate chronic volume loss.  Images reviewed in bone window show right frontal craniotomy similar to the previous study. No acute bony abnormality. There is a mild ethmoid and sphenoid sinusitis. The frontal sinuses are poorly developed. Mastoid air cells are clear.      Impression: 1. A Significant improvement in the right frontal lobe as detailed above. 2. Right frontal craniotomy. Right frontal encephalomalacia stable since the previous study.            This report was finalized on 04/17/2023 12:01 by Dr. Michelle Vigil MD.    CT Angiogram Chest    Result Date: 4/16/2023  Exam: CT angiogram of the of the chest with intravenous contrast.  CLINICAL HISTORY:  Unknown d-dimer. Shortness of breath.  DOSE:  282 mGycm. All CT scans are performed using dose optimization techniques as appropriate to the performed exam and including at least one of the following: Automated exposure control, adjustment of the mA and/or kV according to size, and the use of the iterative reconstruction technique.  TECHNIQUE: Contiguous axial images were obtained through the thorax following intravenous contrast administration with reformatted images obtained in the sagittal and coronal projections from the original data set. Three-dimensional reconstructions are also obtained.  COMPARISON:  04/03/2023  FINDINGS:  Pulmonary arteries:  There is normal enhancement of the pulmonary arteries with no evidence of pulmonary thromboembolic disease.  Aorta:  The thoracic aorta and great vessels are remarkable for atheromatous calcification including at the origin of the great vessels with mild associated stenosis. No evidence of aneurysm or dissection.  LUNGS:  There are increasing bilateral pleural effusions with moderate bilateral effusions now present with bilateral  lower lobe atelectasis. There is also patchy atelectasis in the upper lobes. There is an 11 mm nodule within the right lower lobe abutting the major fissure not definitely appreciated on the previous exam. This may be inflammatory in nature. Groundglass opacity within both lungs are suggestive of interstitial pulmonary edema. The patient is intubated with the endotracheal tube well-positioned. An NG tube has been advanced with the tip in the antrum of the stomach.  Pleural spaces: No pneumothorax is present. There are enlarging bilateral pleural effusions.  HEART: There is mild cardiomegaly. No evidence of right ventricular dysfunction. Heavy coronary artery calcifications are present. No evidence of pericardial effusion.  Bones: No acute osseous abnormalities are demonstrated.  CHEST WALL: No chest wall abnormalities are demonstrated.  Lymph nodes: No enlarged mediastinal or axillary lymphadenopathy is present.  Upper abdomen: Both adrenal glands are enlarged and nodular in appearance likely related to adenomas. An NG tube is well-positioned. On the lowermost cut of today's exam there is significant luminal stenosis of the proximal SMA.      Impression: 1. Significant change from the previous exam of 13 days earlier with increased bilateral pleural effusions. There is groundglass disease within the lungs suggesting interstitial pulmonary edema. There is compressive atelectasis within both lower lobes as well as right middle lobe and upper lobe atelectasis related to the effusions. There is cardiomegaly with heavy coronary calcifications. 2. No evidence of pulmonary thromboembolic disease. 3. There is atheromatous calcification of the thoracic aorta and great vessels without evidence of aneurysm or dissection. There is calcific plaquing with mild associated stenosis within the proximal great vessels at their origin. 4. Endotracheal tube and NG tube in place and are well-positioned. 5. Bilateral adrenal nodules  likely representing adenomas. 6. On the lowermost cut of today's exam there is what appears to be significant luminal stenosis of the proximal SMA. This report was finalized on 04/16/2023 22:13 by Dr. Zack Mendoza MD.    XR Chest 1 View    Result Date: 4/18/2023  EXAMINATION:  XR CHEST 1 VW-  4/18/2023 3:20 AM CDT  HISTORY: Intubated and sedated; S06.4X0A-Epidural hemorrhage without loss of consciousness, initial encounter; Z74.09-Other reduced mobility; Z98.890-Other specified postprocedural states; T81.42XA-Infection following a procedure, deep incisional surgical site, initial encounter; D32.9-Benign neoplasm of meninges, unspecified; E11.65-Type 2 diabetes mellitus with hyperglycemia.  COMPARISON: 04/17/2023.  TECHNIQUE: Single view AP image.  FINDINGS:  There have been no tube or line changes. There is consolidation at the left lung base. The left hemidiaphragm is partially obscured. There is blunting of the costophrenic angles. There are mild hazy infiltrates in the mid and lower lung zones. Heart size is mildly prominent.       Impression: 1. New dense consolidation at the left lung base with left hemidiaphragm obscured. Atelectasis versus pneumonia. 2. Mild hazy infiltrates in the mid and lower lung zones. 3. Blunting of the costophrenic angles bilaterally. Probable small pleural effusions.   This report was finalized on 04/18/2023 07:10 by Dr. Arian Stauffer MD.    XR Chest 1 View    Result Date: 4/17/2023  EXAM/TECHNIQUE: XR CHEST 1 VW-  INDICATION: Intubated and sedated patient  COMPARISON: 4/16/2023  FINDINGS:  Endotracheal tube is 4.5 cm above the robert. Enteric tube terminates below the diaphragm out of the field of view. Resuscitation pads overlie the chest. A right-sided CVL tip overlies the cavoatrial junction. No change in hazy bibasilar opacities likely representing atelectasis and/or small layering pleural effusions. Central vascular congestion and mild perihilar interstitial opacity/edema  is unchanged. No visible pneumothorax.      Impression:  No change in appearance of the chest. This report was finalized on 04/17/2023 06:57 by Dr. Mk Chinchilla MD.    XR Chest 1 View    Result Date: 4/16/2023  EXAMINATION: Chest 1 view 04/16/2023  HISTORY: Endotracheal tube placement  FINDINGS: Today's exam is compared to previous study one day earlier. Defibrillator pads project over the heart. There is pulmonary venous hypertension with interstitial edema. The patient is intubated with the endotracheal tube tip approximately 5.5 cm above the robert. A right IJ deep line has been placed with the tip at the cavoatrial juncture. An NG tube has been advanced into the stomach.      Impression: 1. Multiple support lines in place all well-positioned. 2. Pulmonary venous hypertension with interstitial edema. This report was finalized on 04/16/2023 19:21 by Dr. Zack Mendoza MD.    US Venous Doppler Lower Extremity Bilateral (duplex)    Result Date: 4/17/2023  History: Swelling      Impression: Impression: There is no evidence of deep venous thrombosis or superficial thrombophlebitis of right or left lower extremities.  Comments: Bilateral lower extremity venous duplex exam was performed using color Doppler flow, Doppler waveform analysis, and grayscale imaging, with and without compression. There is no evidence of deep venous thrombosis in the common femoral, superficial femoral, popliteal, peroneal, anterior tibial, and posterior tibial veins bilaterally. No thrombus is identified in the saphenofemoral junctions and greater saphenous veins bilaterally.   This report was finalized on 04/17/2023 15:30 by Dr. Asif Fry MD.    XR Abdomen KUB    Result Date: 4/17/2023  EXAM/TECHNIQUE: XR ABDOMEN KUB-  INDICATION: OG Placement verification; S06.4X0A-Epidural hemorrhage without loss of consciousness, initial encounter; Z74.09-Other reduced mobility; Z98.890-Other specified postprocedural states; T81.42XA-Infection  following a procedure, deep incisional surgical site, initial encounter; D32.9-Benign neoplasm of meninges, unspecified; E11.65-Type 2 diabetes mellitus with hyperglycemia; Z79.4-Long term (curren  COMPARISON: Same-day chest radiograph  FINDINGS:  Enteric tube is in the mid stomach. Visualized bowel gas pattern is nonobstructive in nature. Partially imaged cardiac resuscitation pads and atelectasis and/or small effusions in the lower chest. No acute osseous finding.      Impression:  Enteric tube tip is in the mid stomach. This report was finalized on 04/17/2023 10:06 by Dr. Mk Chinchilla MD.     Personal review of imaging: Reviewed imaging study and agree with current interpretation      Pulmonary Assessment:  1. Acute on chronic hypoxic respiratory failure with increasing oxygen requirement  2. Bilateral pulm infiltrate likely congestive diastolic heart failure  3. Left lower lobe pneumonia likely healthcare-associated pneumonia  4. Pulmonary edema and volume overload  5. Lung nodules and chronic scarring in the lung  6. Atrial fibrillation on Cardizem now  7. COPD with history of tobacco abuse  8. Meningioma s/p craniotomy.    9. S/p washout craniotomy and cranioplasty with flap infection  10. Hypertension  11. Diabetes mellitus insulin-dependent  12. Coronary artery disease  13. Obesity likely obstructive sleep apnea  14. Seizure disorder  15. Chronic pain    Recommend/plan:   • Patient was seen in follow-up visit in CCU today.    He is stable and sedated on ventilator.  No dyssynchrony  • He is on full assist-control volume control ventilation.  Current ventilator settings as follows  • Assist control/volume control ventilation, tidal volume 500, rate 16, PEEP of 5, FiO2 65%,  • He is already started on meropenem.  Follow culture results and de-escalate antibiotics  • No spontaneous breathing trial till patient is stable  • Continue gentle diuresis for heart failure and monitor renal function  electrolytes.  • Routine respiratory care and bronchodilator treatment.    He is on Pulmicort neb and DuoNeb  • CT of the chest angiogram and CT head reviewed.  Neurosurgery following.  • DVT and stress ulcer was addressed.  Patient is unable to get any Lovenox due to risk of bleeding.  • Management of craniotomy per Dr. Portillo.  Patient will be getting Precedex and propofol be slowly weaned down  • Continue PT OT and start nutritional support.  Repeat labs imaging studies from time to time  • CODE STATUS: Full.  Overall prognosis: Guarded.  • We will follow.    This visit was performed by both a physician and an [facility] RN.  I personally evaluated and examined the patient.  I performed all aspects of the medical decision making as documented.    Electronically signed by     Tracy Gupta MD,  Pulmonologist/Intensivist   4/18/2023, 18:06 CDT

## 2023-04-18 NOTE — PROGRESS NOTES
[facility]  INPATIENT PROGRESS NOTE    Patient Name: Elijah Escobar  Date of Admission: 3/31/2023  Today's Date: 04/18/23  Length of Stay: 18  Primary Care Physician: Brianna Martinez APRN    Subjective   Chief Complaint: Patient remains intubated, on ventilator and sedated.  HPI     Per nursing patient has had no issues other than oxygen desaturation and required bagging for short period of time.  That has resolved.    IV Merrem  IV propofol  Levophed is currently off    LFTs improved from yesterday    Review of Systems   Unable patient is sedated on ventilator    Objective    Temp:  [98.4 °F (36.9 °C)-100 °F (37.8 °C)] 99.3 °F (37.4 °C)  Heart Rate:  [57-88] 69  Resp:  [18-22] 18  BP: ()/() 109/61  FiO2 (%):  [30 %-80 %] 65 %  Physical Exam  Vitals and nursing note reviewed.   Constitutional:       Appearance: Normal appearance.   HENT:      Head: Normocephalic and atraumatic.      Right Ear: External ear normal.      Left Ear: External ear normal.      Nose: Nose normal.      Mouth/Throat:      Mouth: Mucous membranes are moist.   Eyes:      Conjunctiva/sclera: Conjunctivae normal.   Cardiovascular:      Rate and Rhythm: Normal rate and regular rhythm.      Pulses: Normal pulses.      Heart sounds: No murmur heard.    No friction rub. No gallop.   Pulmonary:      Effort: Pulmonary effort is normal.      Breath sounds: Normal breath sounds.      Comments: Ventilator setting  Tidal volume 500  Rate 18  FiO2 60%  PEEP 5    Abdominal:      General: Bowel sounds are normal.      Palpations: Abdomen is soft.   Genitourinary:     Comments: Jeong dark yellow urine  Musculoskeletal:      Cervical back: No rigidity.      Right lower leg: Edema present.      Left lower leg: Edema present.   Skin:     General: Skin is warm and dry.   Neurological:      Comments: Sedated   Psychiatric:      Comments: Sedated             Results Review:  I have reviewed the  labs, radiology results, and diagnostic studies.    Laboratory Data:   Results from last 7 days   Lab Units 04/18/23  0413 04/17/23  0308 04/12/23  0939   WBC 10*3/mm3 16.70* 17.15* 10.00   HEMOGLOBIN g/dL 10.2* 9.7* 11.1*   HEMATOCRIT % 33.4* 31.1* 36.1*   PLATELETS 10*3/mm3 203 227 209        Results from last 7 days   Lab Units 04/18/23  0233 04/17/23  0308 04/16/23  2320 04/12/23  0939   SODIUM mmol/L 141 141 140 142   POTASSIUM mmol/L 4.6 5.3* 5.3* 4.1   CHLORIDE mmol/L 103 103 102 105   CO2 mmol/L 28.0 26.0 25.0 27.0   BUN mg/dL 15 15 14 16   CREATININE mg/dL 1.49* 1.44* 1.38* 0.94   CALCIUM mg/dL 7.9* 8.0* 7.7* 8.0*   BILIRUBIN mg/dL 0.3 0.3  --  0.4   ALK PHOS U/L 92 102  --  87   ALT (SGPT) U/L 106* 159*  --  29   AST (SGOT) U/L 38 170*  --  25   GLUCOSE mg/dL 70 165* 201* 135*       Culture Data:   No results found for: BLOODCX, URINECX, WOUNDCX, MRSACX, RESPCX, STOOLCX    Radiology Data:   Imaging Results (Last 24 Hours)     Procedure Component Value Units Date/Time    XR Chest 1 View [[id number]] Collected: 04/18/23 0708     Updated: 04/18/23 0714    Narrative:      EXAMINATION:  XR CHEST 1 VW-  4/18/2023 3:20 AM CDT     HISTORY: Intubated and sedated; S06.4X0A-Epidural hemorrhage without  loss of consciousness, initial encounter; Z74.09-Other reduced mobility;  Z98.890-Other specified postprocedural states; T81.42XA-Infection  following a procedure, deep incisional surgical site, initial encounter;  D32.9-Benign neoplasm of meninges, unspecified; E11.65-Type 2 diabetes  mellitus with hyperglycemia.     COMPARISON: 04/17/2023.     TECHNIQUE: Single view AP image.     FINDINGS:  There have been no tube or line changes. There is  consolidation at the left lung base. The left hemidiaphragm is partially  obscured. There is blunting of the costophrenic angles. There are mild  hazy infiltrates in the mid and lower lung zones. Heart size is mildly  prominent.          Impression:      1. New dense consolidation  at the left lung base with left hemidiaphragm  obscured. Atelectasis versus pneumonia.  2. Mild hazy infiltrates in the mid and lower lung zones.  3. Blunting of the costophrenic angles bilaterally. Probable small  pleural effusions.        This report was finalized on 04/18/2023 07:10 by Dr. Arian Stauffer MD.    US Venous Doppler Lower Extremity Bilateral (duplex) [[id number]] Collected: 04/17/23 1530     Updated: 04/17/23 1533    Narrative:      History: Swelling       Impression:      Impression: There is no evidence of deep venous thrombosis or  superficial thrombophlebitis of right or left lower extremities.     Comments: Bilateral lower extremity venous duplex exam was performed  using color Doppler flow, Doppler waveform analysis, and grayscale  imaging, with and without compression. There is no evidence of deep  venous thrombosis in the common femoral, superficial femoral, popliteal,  peroneal, anterior tibial, and posterior tibial veins bilaterally. No  thrombus is identified in the saphenofemoral junctions and greater  saphenous veins bilaterally.         This report was finalized on 04/17/2023 15:30 by Dr. Asif Fry MD.    CT Abdomen Pelvis Without Contrast [[id number]] Collected: 04/17/23 1153     Updated: 04/17/23 1205    Narrative:      CT ABDOMEN PELVIS WO CONTRAST- 4/17/2023 11:31 AM CDT     HISTORY: elevated lft; S06.4X0A-Epidural hemorrhage without loss of  consciousness, initial encounter; Z74.09-Other reduced mobility;  Z98.890-Other specified postprocedural states; T81.42XA-Infection  following a procedure, deep incisional surgical site, initial encounter;  D32.9-Benign neoplasm of meninges, unspecified; E11.65-Type 2 diabetes  mellitus with hyperglycemia; Z79.4-Long term (current) use of ins      COMPARISON: None     DOSE LENGTH PRODUCT: 1783 mGy cm. Automated exposure control was also  utilized to decrease patient radiation dose.     TECHNIQUE: Axial images of the abdomen pelvis are  performed without IV  contrast. Patient received IV contrast for CTA chest approximately 10  hours prior to the CT abdomen pelvis study, therefore no additional IV  contrast administered at this time. There is artifact associated with  the nonelevated overlying upper extremities.     FINDINGS:  Please refer to CT chest report for discussion of pleural  effusion, cardiomegaly, and basilar atelectasis.     The nonenhanced liver, spleen, pancreas are unremarkable. Gallbladder is  moderately distended with no pericholecystic inflammation or biliary  dilatation. Minimal prominence of the adrenal glands which maintain  their adreniform shape without suspicious nodularity. Excreted contrast  within the nondilated renal collecting systems. No abnormal perinephric  fluid collection. Jeong catheter within the bladder. Bladder wall  thickening may be due to underdistention, however finding can be seen  with cystitis or muscle wall hypertrophy. The prostate is enlarged  measuring 6 cm in width.     There is no free intraperitoneal air loculated abscess. There is a  normal appendix. No small bowel dilatation. Nasogastric tube tip present  within the gastric antrum. There is mild stranding of the subcutaneous  fat of the anterior abdominal wall, greatest within the right lower  abdominal wall. No loculated soft tissue abscess collection. Moderate  dense diffuse vascular calcification with no regional aneurysm. No  pathologic lymphadenopathy.     Degenerative changes greatest within the lower lumbar spine.       Impression:      1. Nonenhanced CT exam with artifact created from the nonelevated  overlying upper extremities. Nasogastric tube tip present in the distal  decompressed stomach. No evidence for bowel obstruction. Appendix  normal. No free air or abscess.  2. Jeong catheter within the bladder. Diffuse bladder wall thickening  may be due to underdistention, however cystitis and/or muscle wall  hypertrophy considered with  prostate enlargement.  3. Moderate atherosclerosis. No regional aneurysm.  4. Stranding of the subcutaneous tissues of the abdominal wall, most  notable in the right lower quadrant, correlation for possible cellulitis  recommended. Nonuniform soft tissue edema/anasarca considered.  This report was finalized on 04/17/2023 12:02 by Dr. Coleen Terry MD.    CT Head Without Contrast [[id number]] Collected: 04/17/23 1154     Updated: 04/17/23 1204    Narrative:      EXAMINATION: CT HEAD WO CONTRAST-      4/17/2023 11:31 AM CDT     HISTORY: change in mental status; S06.4X0A-Epidural hemorrhage without  loss of consciousness, initial encounter; Z74.09-Other reduced mobility;  Z98.890-Other specified postprocedural states; T81.42XA-Infection  following a procedure, deep incisional surgical site, initial encounter;  D32.9-Benign neoplasm of meninges, unspecified; E11.65-Type 2 diabetes  mellitus with hyperglycemia; Z79.4-Long term (current)     In order to have a CT radiation dose as low as reasonably achievable  Automated Exposure Control was utilized for adjustment of the mA and/or  KV according to patient size.     DLP in mGycm= 453     The CT scan of the head is performed without contrast enhancement.     Images are acquired in axial plane and subsequent reconstruction in  coronal and sagittal planes.     Comparison is made with the previous study dated 04/12/2023.     There is interval significant resolution of the right extra-axial  heterogeneous density fluid accumulation/hematoma since the previous  study. There is a small residual subgaleal heterogeneous density fluid  with small amount of air/air bubbles is noted.     There is resolution of significant extrinsic pressure on the right  frontal lobe and the right lateral ventricle anterior horn since the  previous study.     Diffuse decreased attenuation of the right frontal lobe may suggest  vasogenic edema. However there is no significant effacement of  cortical  sulci. This may partly be due to adjacent high density bone and high  density extra-axial fluid.     There is resolution of the extensive impression on the anterior horn of  the right lateral ventricle. There is no midline shift in the presence  study.     An area of encephalomalacia in the right frontal lobe is similar to the  previous study. The gray-white matter differentiation is maintained the  remaining brain.     Moderately prominent ventricles, basal cisterns and cortical sulci  suggesting moderate chronic volume loss.     Images reviewed in bone window show right frontal craniotomy similar to  the previous study. No acute bony abnormality. There is a mild ethmoid  and sphenoid sinusitis. The frontal sinuses are poorly developed.  Mastoid air cells are clear.       Impression:      1. A Significant improvement in the right frontal lobe as detailed  above.  2. Right frontal craniotomy. Right frontal encephalomalacia stable since  the previous study.                                   This report was finalized on 04/17/2023 12:01 by Dr. Michelle Vigil MD.    XR Abdomen KUB [[id number]] Collected: 04/17/23 1005     Updated: 04/17/23 1024    Narrative:      EXAM/TECHNIQUE: XR ABDOMEN KUB-     INDICATION: OG Placement verification; S06.4X0A-Epidural hemorrhage  without loss of consciousness, initial encounter; Z74.09-Other reduced  mobility; Z98.890-Other specified postprocedural states;  T81.42XA-Infection following a procedure, deep incisional surgical site,  initial encounter; D32.9-Benign neoplasm of meninges, unspecified;  E11.65-Type 2 diabetes mellitus with hyperglycemia; Z79.4-Long term  (curren     COMPARISON: Same-day chest radiograph     FINDINGS:     Enteric tube is in the mid stomach. Visualized bowel gas pattern is  nonobstructive in nature. Partially imaged cardiac resuscitation pads  and atelectasis and/or small effusions in the lower chest. No acute  osseous finding.       Impression:          Enteric tube tip is in the mid stomach.  This report was finalized on 04/17/2023 10:06 by Dr. Mk Chinchilla MD.          I have reviewed the patient's current medications.     Assessment/Plan   Assessment  Active Hospital Problems    Diagnosis    • **Swelling of scalp    • Elevated liver function tests    • COPD (chronic obstructive pulmonary disease)    • Obesity (BMI 30-39.9)    • Tobacco abuse, in remission    • Acute systolic heart failure    • ADDIE (obstructive sleep apnea)    • Type 2 myocardial infarction due to arrhythmia    • Acute pulmonary edema due to A-fib RVR    • Acute respiratory failure with hypoxia    • Intracranial epidural hematoma    • Post-operative infection    • Paroxysmal atrial fibrillation with rapid ventricular response    • Type 2 diabetes mellitus with hyperglycemia and peripheral neuropathy, with long-term current use of insulin (HCC)    • Coronary artery disease    • Meningioma s/p craniotomy        Treatment Plan  Continue glucose monitoring, sliding scale insulin  Continue Lasix  CMP, CBC in a.m.  Nutrition consult for tube feed.    Medical Decision Making  Number and Complexity of problems:   1) exacerbation of respiratory failure with hypoxia secondary to pulmonary edema, acute, high complexity  2) PAF with RVR, acute, moderate complexity  3) meningioma versus intracranial infection, acute, high complexity  4) type 2 diabetes, chronic, moderate complexity  5) elevated liver enzymes acute moderate complexity  6) elevated WBC acute moderate complexity  Differential Diagnosis: Pulmonary embolus, LV dysfunction     Conditions and Status        Condition is unchanged.     [facility] Data  External documents reviewed: Care everywhere documentation  Cardiac tracing (EKG, telemetry) interpretation: See HPI  Radiology interpretation: See HPI  Labs reviewed: See HPI  Any tests that were considered but not ordered: None     Decision rules/scores evaluated (example [id number], Wells,  etc): [id number] score of 5 with a 7.2% risk of stroke annually     Discussed with:  nursing      Care Planning  Shared decision making: Patient is sedated  Code status and discussions: full     Disposition  Social Determinants of Health that impact treatment or disposition: none  I expect the patient to be discharged by the primary service.   Electronically signed by Susanne Milligan, 04/18/23, 07:36 CDT.

## 2023-04-18 NOTE — PROGRESS NOTES
RT EQUIPMENT DEVICE RELATED - SKIN ASSESSMENT    RT Medical Equipment/Device:     ETT Martin/[employer]    Skin Assessment:      Cheek:  Intact    Device Skin Pressure Protection:  Skin-to-device areas padded:  Ingrid    Nurse Notification:  Kylie Briggs, RRT

## 2023-04-18 NOTE — PROGRESS NOTES
Neurosurgery Daily Progress Note    HPI:  Elijah Escobar is a 67 y.o. male with a significant medical history of hypertension, diabetes, hyperlipidemia, seizures, craniotomy for tumor (6/16/2022), craniotomy for washout (7/1/2022), craniectomy (10/4/2022), coronary artery disease, diabetes, erectile dysfunction, GERD, hypertension, hyperlipidemia, tobacco abuse, and obesity.  He presents today with a complaint of a 4-5 days onset of progressively worsening right frontal, temporal, and periorbital edema and associated symptoms to include, but not limited to generalized fatigue, chills, dyspnea, intermittent nausea without vomiting, and states he's felt feverish.  Physical exam findings of neurologically intact with right frontal, temporal, and periorbital swelling.  WBC elevated at 15.  3 to an CRP elevated at 14.75.  Imaging pending.    Assessment:   Past Medical History:   Diagnosis Date   • Brain tumor    • Coronary artery disease    • COVID-19 vaccine series completed     MODERNA X 3; LAST DOSE 3/2022   • Diabetes    • Erectile dysfunction    • GERD (gastroesophageal reflux disease)    • Hypercholesteremia    • Hypertension    • Seizure      Active Hospital Problems    Diagnosis    • **Swelling of scalp    • Elevated liver function tests    • COPD (chronic obstructive pulmonary disease)    • Obesity (BMI 30-39.9)    • Tobacco abuse, in remission    • Acute systolic heart failure    • ADDIE (obstructive sleep apnea)    • Type 2 myocardial infarction due to arrhythmia    • Acute pulmonary edema due to A-fib RVR    • Acute respiratory failure with hypoxia    • Intracranial epidural hematoma    • Post-operative infection    • Paroxysmal atrial fibrillation with rapid ventricular response    • Type 2 diabetes mellitus with hyperglycemia and peripheral neuropathy, with long-term current use of insulin (HCC)    • Coronary artery disease    • Meningioma s/p craniotomy      Plan:   Neuro: Limited exam secondary to  intubated on low-dose propofol.  Infected cranial flap      POD #13 (4/4/2023)  craniectomy and washout    Postop CT stable. No acute abnormalities.    Flap tapped I&D cultures 4+ gram-negative bacilli    Heavy 4+ Serratia marcescens     6 Days Post-Op (4/13/2023) evacuation of scalp hematoma    JUAN removed     Continue neuro exams per policy.  Call for decline  CT head reviewed.  Stable.  No acute abnormalities.    CV: Code for PEA yesterday afternoon, 4/16/2023.  Required epinephrine and bicarb   Currently on Levophed   AFib/flutter, rate controlled.   Hold all anticoagulation for 2 weeks.   No evidence of DVT or PE per imaging   Appreciate cardiology  Pulm: Remains intubated on low-dose propofol.  Tentative plan for weaning trial today   Atelectasis versus pneumonia left lung      :  Voiding per Jeong catheter  FEN: Currently NPO  Endocrine: Glucose improving.  Continue SSI  GI: SHERIF.  +BM  ID:  Infected cranial flap  CSF: no growth to date   PNA.  Resolved   UTI.  Resolved   Continue meropenem per ID.  Appreciate assist  Heme:  DVT prophylaxis with SCDs.  Hold all anticoagulation for 2 weeks.  Pain: No complaints at present  Dispo: Pending extubation     Chief complaint:   4-5 days onset of progressively worsening right frontal, temporal, and periorbital edema and associated symptoms to include, but not limited to generalized fatigue, chills, dyspnea, intermittent nausea without vomiting, and states he's felt feverish.    HPI  Subjective  Confusion and slurred speech    Temp:  [98.4 °F (36.9 °C)-100 °F (37.8 °C)] 98.4 °F (36.9 °C)  Heart Rate:  [57-82] 64  Resp:  [16-22] 16  BP: ()/() 116/63  FiO2 (%):  [60 %-80 %] 60 %    Output by Drain (mL) 04/17/23 0701 - 04/17/23 1900 04/17/23 1901 - 04/18/23 0700 04/18/23 0701 - 04/18/23 1055 Range Total   Requested LDAs do not have output data documented.     Objective:  Vital signs: (most recent): Blood pressure 116/63, pulse 64, temperature 98.4 °F (36.9  "°C), temperature source Axillary, resp. rate 16, height 177.8 cm (70\"), weight 124 kg (274 lb 3.2 oz), SpO2 95 %.      Neurologic Exam     Mental Status     Intubated on low-dose propofol     Cranial Nerves     CN III, IV, VI   Pupils are equal, round, and reactive to light.    CN IX, X   Right gag reflex: normal  Left gag reflex: normal    Motor Exam        Drains: * No LDAs found *    Imaging Results (Last 24 Hours)     Procedure Component Value Units Date/Time    XR Chest 1 View [[id number]] Collected: 04/18/23 0708     Updated: 04/18/23 0714    Narrative:      EXAMINATION:  XR CHEST 1 VW-  4/18/2023 3:20 AM CDT     HISTORY: Intubated and sedated; S06.4X0A-Epidural hemorrhage without  loss of consciousness, initial encounter; Z74.09-Other reduced mobility;  Z98.890-Other specified postprocedural states; T81.42XA-Infection  following a procedure, deep incisional surgical site, initial encounter;  D32.9-Benign neoplasm of meninges, unspecified; E11.65-Type 2 diabetes  mellitus with hyperglycemia.     COMPARISON: 04/17/2023.     TECHNIQUE: Single view AP image.     FINDINGS:  There have been no tube or line changes. There is  consolidation at the left lung base. The left hemidiaphragm is partially  obscured. There is blunting of the costophrenic angles. There are mild  hazy infiltrates in the mid and lower lung zones. Heart size is mildly  prominent.          Impression:      1. New dense consolidation at the left lung base with left hemidiaphragm  obscured. Atelectasis versus pneumonia.  2. Mild hazy infiltrates in the mid and lower lung zones.  3. Blunting of the costophrenic angles bilaterally. Probable small  pleural effusions.        This report was finalized on 04/18/2023 07:10 by Dr. Arian Stauffer MD.    US Venous Doppler Lower Extremity Bilateral (duplex) [[id number]] Collected: 04/17/23 1530     Updated: 04/17/23 1533    Narrative:      History: Swelling       Impression:      Impression: There is no " evidence of deep venous thrombosis or  superficial thrombophlebitis of right or left lower extremities.     Comments: Bilateral lower extremity venous duplex exam was performed  using color Doppler flow, Doppler waveform analysis, and grayscale  imaging, with and without compression. There is no evidence of deep  venous thrombosis in the common femoral, superficial femoral, popliteal,  peroneal, anterior tibial, and posterior tibial veins bilaterally. No  thrombus is identified in the saphenofemoral junctions and greater  saphenous veins bilaterally.         This report was finalized on 04/17/2023 15:30 by Dr. Asif Fry MD.    CT Abdomen Pelvis Without Contrast [[id number]] Collected: 04/17/23 1153     Updated: 04/17/23 1205    Narrative:      CT ABDOMEN PELVIS WO CONTRAST- 4/17/2023 11:31 AM CDT     HISTORY: elevated lft; S06.4X0A-Epidural hemorrhage without loss of  consciousness, initial encounter; Z74.09-Other reduced mobility;  Z98.890-Other specified postprocedural states; T81.42XA-Infection  following a procedure, deep incisional surgical site, initial encounter;  D32.9-Benign neoplasm of meninges, unspecified; E11.65-Type 2 diabetes  mellitus with hyperglycemia; Z79.4-Long term (current) use of ins      COMPARISON: None     DOSE LENGTH PRODUCT: 1783 mGy cm. Automated exposure control was also  utilized to decrease patient radiation dose.     TECHNIQUE: Axial images of the abdomen pelvis are performed without IV  contrast. Patient received IV contrast for CTA chest approximately 10  hours prior to the CT abdomen pelvis study, therefore no additional IV  contrast administered at this time. There is artifact associated with  the nonelevated overlying upper extremities.     FINDINGS:  Please refer to CT chest report for discussion of pleural  effusion, cardiomegaly, and basilar atelectasis.     The nonenhanced liver, spleen, pancreas are unremarkable. Gallbladder is  moderately distended with no  pericholecystic inflammation or biliary  dilatation. Minimal prominence of the adrenal glands which maintain  their adreniform shape without suspicious nodularity. Excreted contrast  within the nondilated renal collecting systems. No abnormal perinephric  fluid collection. Jeong catheter within the bladder. Bladder wall  thickening may be due to underdistention, however finding can be seen  with cystitis or muscle wall hypertrophy. The prostate is enlarged  measuring 6 cm in width.     There is no free intraperitoneal air loculated abscess. There is a  normal appendix. No small bowel dilatation. Nasogastric tube tip present  within the gastric antrum. There is mild stranding of the subcutaneous  fat of the anterior abdominal wall, greatest within the right lower  abdominal wall. No loculated soft tissue abscess collection. Moderate  dense diffuse vascular calcification with no regional aneurysm. No  pathologic lymphadenopathy.     Degenerative changes greatest within the lower lumbar spine.       Impression:      1. Nonenhanced CT exam with artifact created from the nonelevated  overlying upper extremities. Nasogastric tube tip present in the distal  decompressed stomach. No evidence for bowel obstruction. Appendix  normal. No free air or abscess.  2. Jeong catheter within the bladder. Diffuse bladder wall thickening  may be due to underdistention, however cystitis and/or muscle wall  hypertrophy considered with prostate enlargement.  3. Moderate atherosclerosis. No regional aneurysm.  4. Stranding of the subcutaneous tissues of the abdominal wall, most  notable in the right lower quadrant, correlation for possible cellulitis  recommended. Nonuniform soft tissue edema/anasarca considered.  This report was finalized on 04/17/2023 12:02 by Dr. Coleen Terry MD.    CT Head Without Contrast [[id number]] Collected: 04/17/23 1154     Updated: 04/17/23 1204    Narrative:      EXAMINATION: CT HEAD WO CONTRAST-       4/17/2023 11:31 AM CDT     HISTORY: change in mental status; S06.4X0A-Epidural hemorrhage without  loss of consciousness, initial encounter; Z74.09-Other reduced mobility;  Z98.890-Other specified postprocedural states; T81.42XA-Infection  following a procedure, deep incisional surgical site, initial encounter;  D32.9-Benign neoplasm of meninges, unspecified; E11.65-Type 2 diabetes  mellitus with hyperglycemia; Z79.4-Long term (current)     In order to have a CT radiation dose as low as reasonably achievable  Automated Exposure Control was utilized for adjustment of the mA and/or  KV according to patient size.     DLP in mGycm= 453     The CT scan of the head is performed without contrast enhancement.     Images are acquired in axial plane and subsequent reconstruction in  coronal and sagittal planes.     Comparison is made with the previous study dated 04/12/2023.     There is interval significant resolution of the right extra-axial  heterogeneous density fluid accumulation/hematoma since the previous  study. There is a small residual subgaleal heterogeneous density fluid  with small amount of air/air bubbles is noted.     There is resolution of significant extrinsic pressure on the right  frontal lobe and the right lateral ventricle anterior horn since the  previous study.     Diffuse decreased attenuation of the right frontal lobe may suggest  vasogenic edema. However there is no significant effacement of cortical  sulci. This may partly be due to adjacent high density bone and high  density extra-axial fluid.     There is resolution of the extensive impression on the anterior horn of  the right lateral ventricle. There is no midline shift in the presence  study.     An area of encephalomalacia in the right frontal lobe is similar to the  previous study. The gray-white matter differentiation is maintained the  remaining brain.     Moderately prominent ventricles, basal cisterns and cortical sulci  suggesting  moderate chronic volume loss.     Images reviewed in bone window show right frontal craniotomy similar to  the previous study. No acute bony abnormality. There is a mild ethmoid  and sphenoid sinusitis. The frontal sinuses are poorly developed.  Mastoid air cells are clear.       Impression:      1. A Significant improvement in the right frontal lobe as detailed  above.  2. Right frontal craniotomy. Right frontal encephalomalacia stable since  the previous study.                                   This report was finalized on 04/17/2023 12:01 by Dr. Michelle Vigil MD.        Lab Results (last 24 hours)     Procedure Component Value Units Date/Time    POC Glucose Once [[id number]]  (Normal) Collected: 04/18/23 0543    Specimen: Blood Updated: 04/18/23 0554     Glucose 72 mg/dL      Comment: : [id number] Deborah Terry ID: [id number]       CBC & Differential [[id number]]  (Abnormal) Collected: 04/18/23 0413    Specimen: Blood Updated: 04/18/23 0435    Narrative:      The following orders were created for panel order CBC & Differential.  Procedure                               Abnormality         Status                     ---------                               -----------         ------                     CBC Auto Differential[[id number]]        Abnormal            Final result                 Please view results for these tests on the individual orders.    CBC Auto Differential [[id number]]  (Abnormal) Collected: 04/18/23 0413    Specimen: Blood Updated: 04/18/23 0435     WBC 16.70 10*3/mm3      RBC 3.38 10*6/mm3      Hemoglobin 10.2 g/dL      Hematocrit 33.4 %      MCV 98.8 fL      MCH 30.2 pg      MCHC 30.5 g/dL      RDW 13.8 %      RDW-SD 49.1 fl      MPV 10.5 fL      Platelets 203 10*3/mm3      Neutrophil % 74.3 %      Lymphocyte % 16.7 %      Monocyte % 7.8 %      Eosinophil % 0.2 %      Basophil % 0.3 %      Immature Grans % 0.7 %      Neutrophils, Absolute 12.42 10*3/mm3      Lymphocytes, Absolute 2.79  10*3/mm3      Monocytes, Absolute 1.30 10*3/mm3      Eosinophils, Absolute 0.03 10*3/mm3      Basophils, Absolute 0.05 10*3/mm3      Immature Grans, Absolute 0.11 10*3/mm3      nRBC 0.0 /100 WBC     Blood Gas, Arterial - [[id number]]  (Abnormal) Collected: 04/18/23 0359    Specimen: Arterial Blood Updated: 04/18/23 0358     Site Right Radial     Marek's Test Positive     pH, Arterial 7.473 pH units      Comment: 83 Value above reference range        pCO2, Arterial 41.9 mm Hg      pO2, Arterial 63.2 mm Hg      Comment: 84 Value below reference range        HCO3, Arterial 30.7 mmol/L      Comment: 83 Value above reference range        Base Excess, Arterial 6.4 mmol/L      Comment: 83 Value above reference range        O2 Saturation, Arterial 92.6 %      Comment: 84 Value below reference range        Temperature 37.0 C      Barometric Pressure for Blood Gas 747 mmHg      Modality Ventilator     FIO2 60 %      Ventilator Mode AC     Set Tidal Volume 500     Set Mech Resp Rate 18.0     PEEP 5.0     Collected by [id number]     Comment: Meter: [id number]     :  [id number]        pCO2, Temperature Corrected 41.9 mm Hg      pH, Temp Corrected 7.473 pH Units      pO2, Temperature Corrected 63.2 mm Hg     Comprehensive Metabolic Panel [[id number]]  (Abnormal) Collected: 04/18/23 0233    Specimen: Blood Updated: 04/18/23 0323     Glucose 70 mg/dL      BUN 15 mg/dL      Creatinine 1.49 mg/dL      Sodium 141 mmol/L      Potassium 4.6 mmol/L      Comment: Slight hemolysis detected by analyzer. Results may be affected.        Chloride 103 mmol/L      CO2 28.0 mmol/L      Calcium 7.9 mg/dL      Total Protein 5.1 g/dL      Albumin 2.7 g/dL      ALT (SGPT) 106 U/L      AST (SGOT) 38 U/L      Alkaline Phosphatase 92 U/L      Total Bilirubin 0.3 mg/dL      Globulin 2.4 gm/dL      A/G Ratio 1.1 g/dL      BUN/Creatinine Ratio 10.1     Anion Gap 10.0 mmol/L      eGFR 51.1 mL/min/1.73     Narrative:      GFR Normal >60  Chronic Kidney  Disease <60  Kidney Failure <15      POC Glucose Once [[id number]]  (Normal) Collected: 04/17/23 2323    Specimen: Blood Updated: 04/17/23 2335     Glucose 82 mg/dL      Comment: : [id number] Deborah Terry ID: [id number]       POC Glucose Once [[id number]]  (Normal) Collected: 04/17/23 1644    Specimen: Blood Updated: 04/17/23 1655     Glucose 104 mg/dL      Comment: : [email or web address] Raymond Santana ID: [id number]       POC Glucose Once [[id number]]  (Normal) Collected: 04/17/23 1226    Specimen: Blood Updated: 04/17/23 1239     Glucose 119 mg/dL      Comment: : [id number] Raymond Santana ID: [id number]           Rahul Arnold, APRN

## 2023-04-19 NOTE — PLAN OF CARE
Goal Outcome Evaluation:  Plan of Care Reviewed With: patient        Progress: no change  Outcome Evaluation: PT completed re-eval. He is now vented. Sedation currently had been paused. Eyes somewhat open but no movement in extremities was observed and no command following. PT completed PROM x 10-15 reps to B UE/LE. PT will cont with ROM/strengthening, joint and limb protection, and command following while vented. If he continues to require vent, would anticipate needing LTACH placement.

## 2023-04-19 NOTE — PROGRESS NOTES
"Pharmacy Dosing Service  Pharmacokinetics  Vancomycin Initial Evaluation  Assessment/Action/Plan:  Loading dose?: 2500 mg  Current Order: Vancomycin 750 mg IVPB every 12 hours  Current end date:04/24/2023  Levels: none  Additional antimicrobial agent(s): Meropenem 1 gm    Vancomycin dosage initiated based on population pharmacokinetic parameters. Pharmacy will continue to follow daily and adjust dose accordingly.     Subjective:  Elijah Escobar is a 67 y.o. male with a Vancomycin \"Pharmacy to Dose\" consult for the treatment of sepsis , day 1 of 5 of treatment.    AUC Model Data:  Loading dose: 2500 mg at 02:30 04/19/2023.  Regimen: 750 mg IV every 12 hours.  Start time: 02:59 on 04/19/2023  Exposure target: AUC24 (range)400-600 mg/L.hr   AUC24,ss: 439 mg/L.hr  PAUC*: 60 %  Ctrough,ss: 15.5 mg/L  Pconc*: 26 %  Tox.: 11 %    Objective:  Ht: 177.8 cm (70\"); Wt: 124 kg (274 lb 3.2 oz)  Estimated Creatinine Clearance: 63.6 mL/min (A) (by C-G formula based on SCr of 1.49 mg/dL (H)).   Creatinine   Date Value Ref Range Status   04/18/2023 1.49 (H) 0.76 - 1.27 mg/dL Final   04/17/2023 1.44 (H) 0.76 - 1.27 mg/dL Final   04/16/2023 1.38 (H) 0.76 - 1.27 mg/dL Final   08/31/2022 0.80 0.60 - 1.30 mg/dL Final     Comment:     Serial Number: [id number]:  [id number]   10/21/2020 1.03 0.60 - 1.30 mg/dL Final   02/07/2020 1.00 0.60 - 1.30 mg/dL Final   02/07/2020 1.00 0.60 - 1.30 mg/dL Final      Lab Results   Component Value Date    WBC 16.70 (H) 04/18/2023    WBC 17.15 (H) 04/17/2023    WBC 10.00 04/12/2023      Baseline culture results:  Microbiology Results (last 10 days)       ** No results found for the last 240 hours. **            Wagner Sims, PharmD  04/19/23 02:06 CDT    "

## 2023-04-19 NOTE — THERAPY RE-EVALUATION
Patient Name: Elijah Escobar  : 1955    MRN: [id number]                              Today's Date: 2023       Admit Date: 3/31/2023    Visit Dx:     ICD-10-CM ICD-9-CM   1. Intracranial epidural hematoma  S06.4X0A 852.40   2. Impaired mobility  Z74.09 799.89   3. S/P craniotomy  Z98.890 V45.89   4. Infection of deep incisional surgical site after procedure, initial encounter  T81.42XA 998.59   5. Meningioma s/p craniotomy  D32.9 225.2   6. Type 2 diabetes mellitus with hyperglycemia and peripheral neuropathy, with long-term current use of insulin (HCC)  E11.65 250.00    Z79.4 790.29     V58.67   7. Paroxysmal atrial fibrillation with rapid ventricular response  I48.0 427.31   8. Swelling of scalp  R22.0 784.2   9. Acute pulmonary edema due to A-fib RVR  J81.0 518.4   10. Acute respiratory failure with hypoxia  J96.01 518.81   11. Type 2 myocardial infarction due to arrhythmia  I49.9 427.9    I21.A1 410.90   12. Obesity (BMI 30-39.9)  E66.9 278.00   13. Tobacco abuse, in remission  F17.201 305.1   14. Acute systolic heart failure  I50.21 428.21   15. ADDIE (obstructive sleep apnea)  G47.33 327.23   16. Secondary hypertension  I15.9 405.99   17. Gastroesophageal reflux disease, unspecified whether esophagitis present  K21.9 530.81   18. Class 2 severe obesity due to excess calories with serious comorbidity and body mass index (BMI) of 38.0 to 38.9 in adult  E66.01 278.01    Z68.38 V85.38   19. Leukocytosis, unspecified type  D72.829 288.60   20. Anemia due to other cause, not classified  D64.89 285.8   21. Smoker  F17.200 305.1   22. History of cranioplasty  Z98.890 V45.89   23. Facial edema  R60.0 782.3   24. Coronary artery disease due to lipid rich plaque  I25.10 414.00    I25.83 414.3   25. Postoperative infection, unspecified type, initial encounter  T81.40XA 998.59   26. Chronic obstructive pulmonary disease, unspecified COPD type  J44.9 496     Patient Active Problem List   Diagnosis   • Meningioma  s/p craniotomy   • Hypertension   • GERD (gastroesophageal reflux disease)   • Coronary artery disease   • Class 2 severe obesity due to excess calories with serious comorbidity and body mass index (BMI) of 38.0 to 38.9 in adult   • Type 2 diabetes mellitus with hyperglycemia and peripheral neuropathy, with long-term current use of insulin (HCC)   • Leukocytosis   • Other specified anemias   • Paroxysmal atrial fibrillation with rapid ventricular response   • Post-operative infection   • Smoker   • History of cranioplasty   • Facial edema   • Swelling of scalp   • Acute pulmonary edema due to A-fib RVR   • Acute respiratory failure with hypoxia   • Type 2 myocardial infarction due to arrhythmia   • COPD (chronic obstructive pulmonary disease)   • Obesity (BMI 30-39.9)   • Tobacco abuse, in remission   • Acute systolic heart failure   • ADIDE (obstructive sleep apnea)   • Intracranial epidural hematoma   • Elevated liver function tests     Past Medical History:   Diagnosis Date   • Brain tumor    • Coronary artery disease    • COVID-19 vaccine series completed     MODERNA X 3; LAST DOSE 3/2022   • Diabetes    • Erectile dysfunction    • GERD (gastroesophageal reflux disease)    • Hypercholesteremia    • Hypertension    • Seizure      Past Surgical History:   Procedure Laterality Date   • BALLOON ANGIOPLASTY, ARTERY Right    • COLONOSCOPY     • CRANIECTOMY Right 7/1/2022    Procedure: CRANIECTOMY WITH INCISIONAL WASHOUT;  Surgeon: Felipe Banerjee MD;  Location:  [facility];  Service: Neurosurgery;  Laterality: Right;   • CRANIOPLASTY Right 10/4/2022    Procedure: CRANIOPLASTY right with Lumbar drain;  Surgeon: Flaco Portillo MD;  Location:  [facility];  Service: Neurosurgery;  Laterality: Right;   • CRANIOPLASTY N/A 4/4/2023    Procedure: CRANIOPLASTY, removal, right, horseshoe;  Surgeon: Flaco Portillo MD;  Location:  [facility];  Service: Neurosurgery;  Laterality: N/A;   • CRANIOTOMY Right 4/12/2023     Procedure: CRANIOTOMY; evacuation of right hematoma;  Surgeon: Flaco Portillo MD;  Location:  [facility];  Service: Neurosurgery;  Laterality: Right;   • CRANIOTOMY FOR TUMOR Right 6/16/2022    Procedure: CRANIOTOMY FOR TUMOR STERIOTACTIC WITH BRAIN LAB, right;  Surgeon: Flaco Portillo MD;  Location:  [facility];  Service: Neurosurgery;  Laterality: Right;      General Information     Row Name 04/19/23 0815          Physical Therapy Time and Intention    Document Type re-evaluation  4/16: hypoxia and confusion. Went PEA and code Blue called. intubated 4/16. Exacerbation of resp failure w hypoxia secondary pulmonary edema. a-fib w RVR.  -JESSICA     Mode of Treatment physical therapy  -JESSICA     Row Name 04/19/23 0815          General Information    Patient Profile Reviewed yes  -JESSICA     Existing Precautions/Restrictions fall;oxygen therapy device and L/min  Helmet when OOB, vent, B UE restraints.  -JESSICA     Barriers to Rehab medically complex;previous functional deficit  -JESSICA     Row Name 04/19/23 0815          Cognition    Orientation Status (Cognition) unable/difficult to assess  -JESSICA     Row Name 04/19/23 0815          Safety Issues, Functional Mobility    Safety Issues Affecting Function (Mobility) ability to follow commands  -JESSICA     Impairments Affecting Function (Mobility) range of motion (ROM);strength  -JESSICA           User Key  (r) = Recorded By, (t) = Taken By, (c) = Cosigned By    Initials Name Provider Type    Bruce Meehan, PT DPT Physical Therapist               Mobility     Row Name 04/19/23 0815          Bed Mobility    Comment, (Bed Mobility) Not following commands to initiate bed mobiilty, sedation was pasued but still no command folljowing.   -JESSICA           User Key  (r) = Recorded By, (t) = Taken By, (c) = Cosigned By    Initials Name Provider Type    Bruce Meehan, PT DPT Physical Therapist               Obj/Interventions     Row Name 04/19/23 0815          Range of Motion  Comprehensive    Comment, General Range of Motion B UE/LE PROM WFL  -JESSICA     Row Name 04/19/23 0815          Strength Comprehensive (MMT)    Comment, General Manual Muscle Testing (MMT) Assessment No movement observed in B UE/LE  -JESSICA     Row Name 04/19/23 0815          Motor Skills    Therapeutic Exercise --  B UE/LE PROM x 10-15 reps  -JESSICA           User Key  (r) = Recorded By, (t) = Taken By, (c) = Cosigned By    Initials Name Provider Type    Bruce Meehan, PT DPT Physical Therapist               Goals/Plan     Row Name 04/19/23 0815          Bed Mobility Goal 1 (PT)    Activity/Assistive Device (Bed Mobility Goal 1, PT) sit to supine;supine to sit;rolling to left;rolling to right  -JESSICA     [place] Level/Cues Needed (Bed Mobility Goal 1, PT) independent  -JESSICA     Time Frame (Bed Mobility Goal 1, PT) long term goal (LTG);by discharge  -JESSICA     Progress/Outcomes (Bed Mobility Goal 1, PT) goal no longer appropriate  d/c goal  -JESSICA     Row Name 04/19/23 0815          Transfer Goal 1 (PT)    Activity/Assistive Device (Transfer Goal 1, PT) sit-to-stand/stand-to-sit;bed-to-chair/chair-to-bed;walker, rolling  -JESSICA     [place] Level/Cues Needed (Transfer Goal 1, PT) modified independence  -JESSICA     Time Frame (Transfer Goal 1, PT) long term goal (LTG);by discharge  -JESSICA     Progress/Outcome (Transfer Goal 1, PT) goal no longer appropriate  d/c goal  -JESSICA     Row Name 04/19/23 0815          Gait Training Goal 1 (PT)    Activity/Assistive Device (Gait Training Goal 1, PT) gait (walking locomotion);increase endurance/gait distance;increase energy conservation;decrease fall risk;walker, rolling  -JESSICA     [place] Level (Gait Training Goal 1, PT) standby assist  -JESSICA     Distance (Gait Training Goal 1, PT) 50ft maintaining O2 saturation at or above 92%  -JESSICA     Time Frame (Gait Training Goal 1, PT) long term goal (LTG);by discharge  -JESSICA     Progress/Outcome (Gait Training Goal 1, PT) goal no longer appropriate  d/c  goal  -JESSICA     Row Name 04/19/23 0815          ROM Goal 1 (PT)    ROM Goal 1 (PT) B UE/LE PROM > AAROM x 20 reps, min assist.  -JESSICA     Time Frame (ROM Goal 1, PT) long-term goal (LTG);10 days  -JESSICA     Progress/Outcome (ROM Goal 1, PT) new goal  -JESSICA     Row Name 04/19/23 0815          Problem Specific Goal 1 (PT)    Problem Specific Goal 1 (PT) No new evidence of skin breakdown or contractures in B UE/LE  -JESSICA     Time Frame (Problem Specific Goal 1, PT) long-term goal (LTG)  -JESSICA     Progress/Outcome (Problem Specific Goal 1, PT) new goal  -JESSICA     Row Name 04/19/23 0815          Therapy Assessment/Plan (PT)    Planned Therapy Interventions (PT) bed mobility training;patient/family education;ROM (range of motion);strengthening  -JESSICA           User Key  (r) = Recorded By, (t) = Taken By, (c) = Cosigned By    Initials Name Provider Type    Bruce Meehan, PT DPT Physical Therapist               Clinical Impression     [facility] Name 04/19/23 0815          Pain    Additional Documentation Pain Scale: FACES Pre/Post-Treatment (Group)  -JESSICA     [facility] Name 04/19/23 0815          Pain Scale: FACES Pre/Post-Treatment    Pain: FACES Scale, Pretreatment 0-->no hurt  -JESSICA     Row Name 04/19/23 0815          Plan of Care Review    Plan of Care Reviewed With patient  -JESSICA     Progress no change  -JESSICA     Outcome Evaluation PT completed re-eval. He is now vented. Sedation currently had been paused. Eyes somewhat open but no movement in extremities was observed and no command following. PT completed PROM x 10-15 reps to B UE/LE. PT will cont with ROM/strengthening, joint and limb protection, and command following while vented. If he continues to require vent, would anticipate needing LTACH placement.  -JESSICA     Row Name 04/19/23 0815          Therapy Assessment/Plan (PT)    Patient/Family Therapy Goals Statement (PT) pt unable to state  -JESSICA     Rehab Potential (PT) fair, will monitor progress closely  -JESSICA     Criteria for Skilled Interventions  Met (PT) yes;meets criteria;skilled treatment is necessary  -JESSICA     Therapy Frequency (PT) daily  -JESSICA     Predicted Duration of Therapy Intervention (PT) until d.c  -JESSICA     Row Name 04/19/23 0815          Positioning and Restraints    Pre-Treatment Position in bed  -JESSICA     Post Treatment Position bed  -JESSICA     In Bed notified nsg;fowlers;call light within reach;encouraged to call for assist;patient within staff view;with nsg  -JESSICA     Restraints reapplied:;soft limb  B UE  -JESSICA           User Key  (r) = Recorded By, (t) = Taken By, (c) = Cosigned By    Initials Name Provider Type    Bruce Meehan, PT DPT Physical Therapist               Outcome Measures     Row Name 04/19/23 0815          How much help from another person do you currently need...    Turning from your back to your side while in flat bed without using bedrails? 1  -JESSICA     Moving from lying on back to sitting on the side of a flat bed without bedrails? 1  -JESSICA     Moving to and from a bed to a chair (including a wheelchair)? 1  -JESSICA     Standing up from a chair using your arms (e.g., wheelchair, bedside chair)? 1  -JESSICA     Climbing 3-5 steps with a railing? 1  -JESSICA     To walk in hospital room? 1  -JESSICA     AM-PAC 6 Clicks Score (PT) 6  -JESSICA     Highest level of mobility 2 --> Bed activities/dependent transfer  -JESSICA     Row Name 04/19/23 0815          Functional Assessment    Outcome Measure Options AM-PAC 6 Clicks Basic Mobility (PT)  -JESSICA           User Key  (r) = Recorded By, (t) = Taken By, (c) = Cosigned By    Initials Name Provider Type    Bruce Meehan, PT DPT Physical Therapist                             Physical Therapy Education     Title: PT OT SLP Therapies (In Progress)     Topic: Physical Therapy (In Progress)     Point: Mobility training (In Progress)     Learning Progress Summary           Patient AcceptanceJACK NR by JUAN at 4/11/2023 0809    Comment: Looking ahead during gait    Acceptance, GIOVANI SANTIAGO, MADHAV,GARRET by JUAN at 4/10/2023 1441     Comment: Safety with transfers, please take time, no impulsiveness    Acceptance, E,TB, VU by CM at 4/6/2023 1350    Acceptance, E, VU by SB at 4/5/2023 1605    Comment: helmet fit, safety, POC    Acceptance, E, VU by CHRISTIANO at 4/3/2023 1353    Comment: gait, safety,    Acceptance, E, VU by SB at 4/1/2023 1405    Comment: pt edu on POC, benefits of act, PLB and d/c plans   Family Acceptance, E,TB, VU by CM at 4/6/2023 1350                   Point: Home exercise program (In Progress)     Learning Progress Summary           Patient Acceptance, E, NR by JESSICA at 4/19/2023 0815    Comment: progression of PT    Acceptance, E,TB, VU by CM at 4/6/2023 1350   Family Acceptance, E,TB, VU by CM at 4/6/2023 1350                   Point: Body mechanics (Done)     Learning Progress Summary           Patient Acceptance, E,TB, VU by CM at 4/6/2023 1350   Family Acceptance, E,TB, VU by CM at 4/6/2023 1350                   Point: Precautions (Done)     Learning Progress Summary           Patient Acceptance, E,TB, VU by CM at 4/6/2023 1350    Acceptance, E, VU by SB at 4/5/2023 1605    Comment: helmet fit, safety, POC    Acceptance, E, VU by SB at 4/1/2023 1405    Comment: pt edu on POC, benefits of act, PLB and d/c plans   Family Acceptance, E,TB, VU by CM at 4/6/2023 1350                               User Key     Initials Effective Dates Name Provider Type Discipline    JESSICA 02/03/23 -  Bruce Pillai, PT DPT Physical Therapist PT    JUAN 02/03/23 -  Carrie Callaway PTA Physical Therapist Assistant PT    CHRISTIANO 02/03/23 -  Александр Saravia PTA Physical Therapist Assistant PT    CM 03/02/23 -  Dianelys Carlton, RN Registered Nurse Nurse    SB 06/16/21 -  Erika Javier, PT DPT Physical Therapist PT              PT Recommendation and Plan  Planned Therapy Interventions (PT): bed mobility training, patient/family education, ROM (range of motion), strengthening  Plan of Care Reviewed With: patient  Progress: no change  Outcome Evaluation:  PT completed re-eval. He is now vented. Sedation currently had been paused. Eyes somewhat open but no movement in extremities was observed and no command following. PT completed PROM x 10-15 reps to B UE/LE. PT will cont with ROM/strengthening, joint and limb protection, and command following while vented. If he continues to require vent, would anticipate needing LTACH placement.     Time Calculation:    PT Charges     Row Name 04/19/23 0957             Time Calculation    Start Time 0815  -JESSICA      Stop Time 0900  -JESSICA      Time Calculation (min) 45 min  -JESSICA      PT Received On 04/19/23  -JESSICA      PT Goal Re-Cert Due Date 04/29/23  -JESSICA         Time Calculation- PT    Total Timed Code Minutes- PT 15 minute(s)  -JESSICA         Timed Charges    58246 - PT Therapeutic Exercise Minutes 15  -JESSICA         Total Minutes    Timed Charges Total Minutes 15  -JESSICA       Total Minutes 15  -JESSICA            User Key  (r) = Recorded By, (t) = Taken By, (c) = Cosigned By    Initials Name Provider Type    Bruce Meehan, PT DPT Physical Therapist              Therapy Charges for Today     Code Description Service Date Service Provider Modifiers Qty    34506639851 HC PT RE-EVAL ESTABLISHED PLAN 2 4/19/2023 Bruce Pillai, PT DPT GP 1    66890527929 HC PT THER PROC EA 15 MIN 4/19/2023 Bruce Pillai, PT DPT GP 1          PT G-Codes  Outcome Measure Options: AM-PAC 6 Clicks Basic Mobility (PT)  AM-PAC 6 Clicks Score (PT): 6  AM-PAC 6 Clicks Score (OT): 20  PT Discharge Summary  Anticipated Discharge Disposition (PT): [facility] ([facility] care hospital)    Bruce Pillai, PT DPT  4/19/2023

## 2023-04-19 NOTE — THERAPY DISCHARGE NOTE
Acute Care - Occupational Therapy Discharge Summary  [facility]     Patient Name: Elijah Escobar  : 1955  MRN: [id number]    Today's Date: 2023                 Admit Date: 3/31/2023        OT Recommendation and Plan    Visit Dx:    ICD-10-CM ICD-9-CM   1. Intracranial epidural hematoma  S06.4X0A 852.40   2. Impaired mobility  Z74.09 799.89   3. S/P craniotomy  Z98.890 V45.89   4. Infection of deep incisional surgical site after procedure, initial encounter  T81.42XA 998.59   5. Meningioma s/p craniotomy  D32.9 225.2   6. Type 2 diabetes mellitus with hyperglycemia and peripheral neuropathy, with long-term current use of insulin (HCC)  E11.65 250.00    Z79.4 790.29     V58.67   7. Paroxysmal atrial fibrillation with rapid ventricular response  I48.0 427.31   8. Swelling of scalp  R22.0 784.2   9. Acute pulmonary edema due to A-fib RVR  J81.0 518.4   10. Acute respiratory failure with hypoxia  J96.01 518.81   11. Type 2 myocardial infarction due to arrhythmia  I49.9 427.9    I21.A1 410.90   12. Obesity (BMI 30-39.9)  E66.9 278.00   13. Tobacco abuse, in remission  F17.201 305.1   14. Acute systolic heart failure  I50.21 428.21   15. ADDIE (obstructive sleep apnea)  G47.33 327.23   16. Secondary hypertension  I15.9 405.99   17. Gastroesophageal reflux disease, unspecified whether esophagitis present  K21.9 530.81   18. Class 2 severe obesity due to excess calories with serious comorbidity and body mass index (BMI) of 38.0 to 38.9 in adult  E66.01 278.01    Z68.38 V85.38   19. Leukocytosis, unspecified type  D72.829 288.60   20. Anemia due to other cause, not classified  D64.89 285.8   21. Smoker  F17.200 305.1   22. History of cranioplasty  Z98.890 V45.89   23. Facial edema  R60.0 782.3   24. Coronary artery disease due to lipid rich plaque  I25.10 414.00    I25.83 414.3   25. Postoperative infection, unspecified type, initial encounter  T81.40XA 998.59   26. Chronic obstructive pulmonary disease,  unspecified COPD type  J44.9 496                OT Rehab Goals     Row Name 04/19/23 1500             Transfer Goal 1 (OT)    Activity/Assistive Device (Transfer Goal 1, OT) toilet;shower chair  -LR      [place] Level/Cues Needed (Transfer Goal 1, OT) independent  -LR      Time Frame (Transfer Goal 1, OT) long term goal (LTG)  -LR      Progress/Outcome (Transfer Goal 1, OT) goal not met  -LR         Dressing Goal 1 (OT)    Activity/Device (Dressing Goal 1, OT) lower body dressing  -LR      [place]/Cues Needed (Dressing Goal 1, OT) independent  -LR      Time Frame (Dressing Goal 1, OT) long term goal (LTG)  -LR      Progress/Outcome (Dressing Goal 1, OT) goal not met  -LR         Toileting Goal 1 (OT)    Activity/Device (Toileting Goal 1, OT) toileting skills, all  -LR      [place] Level/Cues Needed (Toileting Goal 1, OT) independent  -LR      Time Frame (Toileting Goal 1, OT) long term goal (LTG);by discharge  -LR      Progress/Outcome (Toileting Goal 1, OT) goal not met  -LR            User Key  (r) = Recorded By, (t) = Taken By, (c) = Cosigned By    Initials Name Provider Type Discipline    LR Alexandra Holcomb, OT Occupational Therapist OT                    Timed Therapy Charges  Total Units: 1    Suggested Charges  Total Units: 1    Procedure Name Documented Minutes Units Code    HC OT THERAPEUTIC ACT EA 15 MIN 13  1    69330 (CPT®)               Documented Minutes  Total Minutes: 13    Therapy Provided Minutes    70460 - OT Therapeutic Activity Minutes 13                  Per RN, pt remains on vent, not following commands, and not appropriate for participation in tx at this time. OT to sign off this date. Please reconsult upon change in medical status and pt appropriateness for participation.          Alexandra Holcomb OT  4/19/2023

## 2023-04-19 NOTE — PROGRESS NOTES
RT EQUIPMENT DEVICE RELATED - SKIN ASSESSMENT    RT Medical Equipment/Device:     ETT [employer]    Skin Assessment:      Cheek:  Intact    Device Skin Pressure Protection:  Skin-to-device areas padded:  Ingrid    Nurse Notification:  Kylie Condon, CRT

## 2023-04-19 NOTE — PROGRESS NOTES
[facility] Medicine Services  INPATIENT PROGRESS NOTE    Patient Name: Elijah Escobar  Date of Admission: 3/31/2023  Today's Date: 04/19/23  Length of Stay: 19  Primary Care Physician: Brianna Martinez APRN    Subjective   Chief Complaint: patient remains sedated on ventilator.   HPI   Overnight developed fever to 102.3.  Blood and urine cultures obtained.   Tolerating tube feeds started yesterday.     Vent    FiO2 30%  R 16  PEEP 7.5    Merrem/levaquin/vancomycin  Propofol    Tube feed 35 cc/h      Review of Systems   All pertinent negatives and positives are as above. All other systems have been reviewed and are negative unless otherwise stated.     Objective    Temp:  [98.4 °F (36.9 °C)-102.3 °F (39.1 °C)] 100 °F (37.8 °C)  Heart Rate:  [] 74  Resp:  [16-26] 26  BP: (105-152)/(54-77) 120/54  FiO2 (%):  [30 %-60 %] 30 %  Physical Exam  Vitals and nursing note reviewed.   Constitutional:       Appearance: Normal appearance.   HENT:      Head: Normocephalic.      Right Ear: External ear normal.      Left Ear: External ear normal.      Nose: Nose normal.      Mouth/Throat:      Mouth: Mucous membranes are moist.   Eyes:      Conjunctiva/sclera: Conjunctivae normal.      Pupils: Pupils are equal, round, and reactive to light.   Cardiovascular:      Rate and Rhythm: Normal rate and regular rhythm.      Pulses: Normal pulses.      Heart sounds: No murmur heard.    No friction rub. No gallop.   Pulmonary:      Comments: Remains on ventilator lungs sounds coarse  Abdominal:      General: Bowel sounds are normal.      Palpations: Abdomen is soft.   Musculoskeletal:         General: No deformity.      Cervical back: Normal range of motion and neck supple.   Skin:     General: Skin is warm and dry.   Neurological:      Comments: Sedated     Psychiatric:      Comments: sedated           Results Review:  I have reviewed the labs, radiology results, and diagnostic  studies.    Laboratory Data:   Results from last 7 days   Lab Units 04/19/23  0657 04/18/23  0413 04/17/23  0308   WBC 10*3/mm3 13.61* 16.70* 17.15*   HEMOGLOBIN g/dL 10.2* 10.2* 9.7*   HEMATOCRIT % 33.8* 33.4* 31.1*   PLATELETS 10*3/mm3 163 203 227        Results from last 7 days   Lab Units 04/18/23  0233 04/17/23  0308 04/16/23  2320 04/12/23  0939   SODIUM mmol/L 141 141 140 142   POTASSIUM mmol/L 4.6 5.3* 5.3* 4.1   CHLORIDE mmol/L 103 103 102 105   CO2 mmol/L 28.0 26.0 25.0 27.0   BUN mg/dL 15 15 14 16   CREATININE mg/dL 1.49* 1.44* 1.38* 0.94   CALCIUM mg/dL 7.9* 8.0* 7.7* 8.0*   BILIRUBIN mg/dL 0.3 0.3  --  0.4   ALK PHOS U/L 92 102  --  87   ALT (SGPT) U/L 106* 159*  --  29   AST (SGOT) U/L 38 170*  --  25   GLUCOSE mg/dL 70 165* 201* 135*       Culture Data:   No results found for: BLOODCX, URINECX, WOUNDCX, MRSACX, RESPCX, STOOLCX    Radiology Data:   Imaging Results (Last 24 Hours)     Procedure Component Value Units Date/Time    XR Chest 1 View [[id number]] Collected: 04/19/23 0651     Updated: 04/19/23 0656    Narrative:      EXAM/TECHNIQUE: XR CHEST 1 VW-     INDICATION: Intubated and sedated patient, epidural hemorrhage     COMPARISON: Prior day     FINDINGS:     Endotracheal tube and RIGHT IJ CVL are stable in position. Enteric tube  terminates below the diaphragm out of the field of view.     Cardiac silhouette is prominent but stable. No change in layering  bilateral pleural effusions and diffuse interstitial and airspace  opacity. No evidence of pneumothorax.       Impression:         No change in appearance of the chest.  This report was finalized on 04/19/2023 06:52 by Dr. Mk Chinchilla MD.          I have reviewed the patient's current medications.     Assessment/Plan   Assessment  Active Hospital Problems    Diagnosis    • **Swelling of scalp    • Elevated liver function tests    • COPD (chronic obstructive pulmonary disease)    • Obesity (BMI 30-39.9)    • Tobacco abuse, in remission    •  Acute systolic heart failure    • ADDIE (obstructive sleep apnea)    • Type 2 myocardial infarction due to arrhythmia    • Acute pulmonary edema due to A-fib RVR    • Acute respiratory failure with hypoxia    • Intracranial epidural hematoma    • Post-operative infection    • Paroxysmal atrial fibrillation with rapid ventricular response    • Type 2 diabetes mellitus with hyperglycemia and peripheral neuropathy, with long-term current use of insulin (HCC)    • Coronary artery disease    • Meningioma s/p craniotomy        Treatment Plan  Continue tube feeding advance as tolerated.   Consider changing current central line out.      Medical Decision Making  Number and Complexity of problems:   1) exacerbation of respiratory failure with hypoxia secondary to pulmonary edema, acute, high complexity  2) PAF with RVR, acute, moderate complexity  3) meningioma versus intracranial infection, acute, high complexity  4) type 2 diabetes, chronic, moderate complexity  5) elevated liver enzymes acute moderate complexity  6) elevated WBC acute moderate complexity  Differential Diagnosis: Pulmonary embolus, LV dysfunction     Conditions and Status        Condition is unchanged.     [facility] Data  External documents reviewed: Care everywhere documentation  Cardiac tracing (EKG, telemetry) interpretation: See HPI  Radiology interpretation: See HPI  Labs reviewed: See HPI  Any tests that were considered but not ordered: None     Decision rules/scores evaluated (example [id number], Wells, etc): [id number] score of 5 with a 7.2% risk of stroke annually     Discussed with:  nursing      Care Planning  Shared decision making: Patient is sedated  Code status and discussions: full     Disposition  Social Determinants of Health that impact treatment or disposition: none  I expect the patient to be discharged by the primary service.   Electronically signed by Susanne Milligan, 04/19/23, 07:23 CDT.

## 2023-04-19 NOTE — PROGRESS NOTES
Infectious Diseases Progress Note    Patient:  Elijah Escobar  YOB: 1955  MRN: [id number]   Admit date: 3/31/2023   Admitting Physician: Flaco Portillo, *  Primary Care Physician: Brianna Martinez APRN    Chief Complaint/Interval History: I came to see him and he was at [facility].  Note initiated.  I was able to see him shortly after his return from [facility].  He had had fever overnight.  Cultures obtained.  Vancomycin added to his treatment with meropenem.  Per discussion with nursing despite lightening his sedation he had not had much improvement in mental status or following commands.  Results of MRI pending.  Son at bedside.  Talked with him about plan from infectious diseases standpoint.    Intake/Output Summary (Last 24 hours) at 4/19/2023 1224  Last data filed at 4/19/2023 0800  Gross per 24 hour   Intake 1610.4 ml   Output 1450 ml   Net 160.4 ml     Allergies: No Known Allergies  Current Scheduled Medications:   aspirin, 81 mg, Oral, Daily  atorvastatin, 20 mg, Oral, Nightly  budesonide, 0.5 mg, Nebulization, BID - RT  Chlorhexidine Gluconate Cloth, 1 application, Topical, Q24H  digoxin, 250 mcg, Oral, Daily  dilTIAZem, 90 mg, Oral, Q8H  furosemide, 40 mg, Intravenous, BID  insulin detemir, 10 Units, Subcutaneous, Q12H  insulin lispro, 0-14 Units, Subcutaneous, TID AC  ipratropium, 0.5 mg, Nebulization, 4x Daily - RT  levETIRAcetam, 500 mg, Oral, BID  [START ON 4/21/2023] levoFLOXacin, 500 mg, Oral, Q24H  meropenem, 1 g, Intravenous, Q8H  methylnaltrexone, 12 mg, Subcutaneous, Every Other Day  metoclopramide, 10 mg, Oral, 4x Daily AC & at Bedtime  metoprolol tartrate, 50 mg, Oral, Q12H  multivitamin with minerals, 1 tablet, Oral, Daily  pantoprazole, 40 mg, Intravenous, Q AM  polyethylene glycol, 17 g, Oral, Daily  sennosides-docusate, 1 tablet, Oral, Daily  sodium chloride, 10 mL, Intravenous, Q12H  sucralfate, 1 g, Oral, 4x Daily AC & at Bedtime  vancomycin, 1,000 mg, Intravenous,  "Q12H    Current PRN Medications:  •  acetaminophen **OR** acetaminophen **OR** acetaminophen  •  butalbital-acetaminophen-caffeine  •  calcium carbonate  •  dextrose  •  dextrose  •  glucagon (human recombinant)  •  hydrOXYzine  •  ipratropium  •  ipratropium-albuterol  •  LORazepam  •  nicotine  •  ondansetron **OR** ondansetron  •  oxyCODONE-acetaminophen  •  oxyCODONE-acetaminophen  •  sodium chloride  •  sodium chloride    Review of Systems see HPI    Vital Signs:  /78   Pulse 84   Temp 99.6 °F (37.6 °C) (Axillary)   Resp 26   Ht 177.8 cm (70\")   Wt 123 kg (272 lb)   SpO2 100%   BMI 39.03 kg/m²     Physical Exam  Vital signs - reviewed.  Exam of craniotomy flap site without swelling, warmth, drainage, or discoloration.  Left arm peripheral IV and right IJ site without signs of infection  Abdomen nondistended    Lab Results:  CBC:   Results from last 7 days   Lab Units 04/19/23  0657 04/18/23  0413 04/17/23  0308   WBC 10*3/mm3 13.61* 16.70* 17.15*   HEMOGLOBIN g/dL 10.2* 10.2* 9.7*   HEMATOCRIT % 33.8* 33.4* 31.1*   PLATELETS 10*3/mm3 163 203 227     BMP:  Results from last 7 days   Lab Units 04/19/23  0657 04/18/23  0233 04/17/23  0308 04/16/23  2320   SODIUM mmol/L 138 141 141 140   POTASSIUM mmol/L 4.2 4.6 5.3* 5.3*   CHLORIDE mmol/L 101 103 103 102   CO2 mmol/L 24.0 28.0 26.0 25.0   BUN mg/dL 16 15 15 14   CREATININE mg/dL 1.26 1.49* 1.44* 1.38*   GLUCOSE mg/dL 139* 70 165* 201*   CALCIUM mg/dL 7.7* 7.9* 8.0* 7.7*   ALT (SGPT) U/L 60* 106* 159*  --      Urinalysis too numerous to count red blood cells, 21-30 white blood cells    Culture Results: Blood cultures drawn earlier today-pending  Urine culture pending    Radiology:     Head CT April 17, 2023:  IMPRESSION:  1. A Significant improvement in the right frontal lobe as detailed  above.  2. Right frontal craniotomy. Right frontal encephalomalacia stable since  the previous study.  This report was finalized on 04/17/2023 12:01 by Dr. Martinez " MD Musa.    Additional Studies Reviewed: None    Impression:   1.  Prior infection at cranioplasty site-Serratia-have been stable/improving with treatment.  2.  New onset fever-work-up with blood cultures, urinalysis, and urine culture initiated.  Empiric treatment with vancomycin added pending clinical course and culture results.  3.  Episode of PEA    Recommendations:   Continue antibiotic treatment with vancomycin and Merrem  Follow temperature curve  Await culture results  CNS imaging pending  Continue to follow    Edwin Jeffers MD

## 2023-04-19 NOTE — PROGRESS NOTES
[facility] [place] PULMONARY AND CRITICAL CARE PROGRESS NOTE - [facility]    Patient: Elijah Escobar    1955    MR# [id number]    Acct# [id number]  04/19/23   06:36 CDT  Referring Provider: Flaco Portillo, *    Chief Complaint: Mechanically ventilated    Interval history: Elevated temperature overnight.  Highest 102.3.  New cultures have been obtained.  Vancomycin has been added.  He is still on Merrem/Levaquin.  Infectious diseases following.  Current saturation 94 on PEEP of 7.5 and FiO2 0.3.  Peak airway pressure 24.  He is passively ventilating.  Vent rate reduced to 14.  No labs for review today.  I have added stat CBC/BMP. Nutrition consult placed yesterday to start tube feeds.  He is tolerating well per nursing.    Meds:  aspirin, 81 mg, Oral, Daily  atorvastatin, 20 mg, Oral, Nightly  budesonide, 0.5 mg, Nebulization, BID - RT  Chlorhexidine Gluconate Cloth, 1 application, Topical, Q24H  digoxin, 250 mcg, Oral, Daily  dilTIAZem, 90 mg, Oral, Q8H  furosemide, 40 mg, Intravenous, BID  insulin detemir, 10 Units, Subcutaneous, Q12H  insulin lispro, 0-14 Units, Subcutaneous, TID AC  ipratropium, 0.5 mg, Nebulization, 4x Daily - RT  levETIRAcetam, 500 mg, Oral, BID  [START ON 4/21/2023] levoFLOXacin, 500 mg, Oral, Q24H  meropenem, 1 g, Intravenous, Q8H  methylnaltrexone, 12 mg, Subcutaneous, Every Other Day  metoclopramide, 10 mg, Oral, 4x Daily AC & at Bedtime  metoprolol tartrate, 50 mg, Oral, Q12H  multivitamin with minerals, 1 tablet, Oral, Daily  pantoprazole, 40 mg, Intravenous, Q AM  polyethylene glycol, 17 g, Oral, Daily  sennosides-docusate, 1 tablet, Oral, Daily  sodium chloride, 10 mL, Intravenous, Q12H  sucralfate, 1 g, Oral, 4x Daily AC & at Bedtime  vancomycin, 750 mg, Intravenous, Q12H      norepinephrine, 0.02-0.3 mcg/kg/min, Last Rate: Stopped (04/17/23 2005)  Pharmacy to dose vancomycin,   propofol, 5-50 mcg/kg/min, Last Rate: 40 mcg/kg/min (04/19/23 9123)  vasopressin, 0.03  Units/min, Last Rate: Stopped (04/16/23 2027)        Ventilator Settings:        Resp Rate (Set): 16     FiO2 (%): 30 %  PEEP/CPAP (cm H2O): 7.5 cm H20  Minute Ventilation (L/min) (Obs): 9.9 L/min  Resp Rate (Observed) Vent: 20     I:E Ratio (Obs): 1:2.2  PIP Observed (cm H2O): 30 cm H2O     Physical Exam:  Temp:  [98.4 °F (36.9 °C)-102.3 °F (39.1 °C)] 100 °F (37.8 °C)  Heart Rate:  [] 74  Resp:  [16-26] 26  BP: (105-152)/(54-77) 120/54  FiO2 (%):  [30 %-60 %] 30 %  Intake/Output Summary (Last 24 hours) at 4/19/2023 0636  Last data filed at 4/19/2023 0400  Gross per 24 hour   Intake 1692.2 ml   Output 1850 ml   Net -157.8 ml     SpO2 Percentage    04/18/23 2200 04/18/23 2215 04/18/23 2230   SpO2: 95% 94% 94%   Body mass index is 39.03 kg/m².   Physical Exam   Constitutional:       Appearance: He is obese. He is ill-appearing.  Intubated and sedated  HENT:      Head: Normocephalic. Right scalp wound, well-healed, no redness warmth or drainage     Right Ear: External ear normal.      Left Ear: External ear normal.      Nose: Nose normal.      Mouth/Throat: ETT/OG/TF  Eyes:      General:         Right eye: No discharge.         Left eye: No discharge.      Conjunctiva/sclera: Conjunctivae normal.      Pupils: Pupils are equal, round, and reactive to light.   Cardiovascular:      Rate and Rhythm: Rhythm irregular, rate 71     Heart sounds: No murmur heard.   Pulmonary:      Effort: Pulmonary effort is normal. No respiratory distress.      Breath sounds: No wheezing.  Diminished in bases  Abdominal:      General: Abdomen is flat. Bowel sounds are normal. There is no distension.      Palpations: Abdomen is soft. There is no mass.   Musculoskeletal:      Cervical back: Neck supple.      Right lower leg: No edema.      Left lower leg: No edema.   Skin:     General: Skin is warm and dry.      Coloration: Skin is not jaundiced or pale.   Neurological:      General: Intubated and sedated     Electronically signed by  Alia Gonsalves, APRN, 4/19/2023, 06:36 CDT      Physician substantive portion: medical decision making  Result Review  Laboratory Data:  Results from last 7 days   Lab Units 04/19/23  0657 04/18/23  0413 04/17/23  0308   WBC 10*3/mm3 13.61* 16.70* 17.15*   HEMOGLOBIN g/dL 10.2* 10.2* 9.7*   PLATELETS 10*3/mm3 163 203 227     Results from last 7 days   Lab Units 04/19/23  0657 04/18/23  0233 04/17/23  0308 04/16/23  2320   SODIUM mmol/L 138 141 141 140   POTASSIUM mmol/L 4.2 4.6 5.3* 5.3*   CO2 mmol/L 24.0 28.0 26.0 25.0   BUN mg/dL 16 15 15 14   CREATININE mg/dL 1.26 1.49* 1.44* 1.38*   CRP mg/dL  --   --   --  1.39*     Results from last 7 days   Lab Units 04/19/23  0352 04/18/23  0359 04/17/23  0356   PH, ARTERIAL pH units 7.484* 7.473* 7.403   PCO2, ARTERIAL mm Hg 36.9 41.9 44.6   PO2 ART mm Hg 62.4* 63.2* 115.0*   FIO2 % 30 60 50     Microbiology Results (last 10 days)     ** No results found for the last 240 hours. **         Recent films:  CT Abdomen Pelvis Without Contrast    Result Date: 4/17/2023  CT ABDOMEN PELVIS WO CONTRAST- 4/17/2023 11:31 AM CDT  HISTORY: elevated lft; S06.4X0A-Epidural hemorrhage without loss of consciousness, initial encounter; Z74.09-Other reduced mobility; Z98.890-Other specified postprocedural states; T81.42XA-Infection following a procedure, deep incisional surgical site, initial encounter; D32.9-Benign neoplasm of meninges, unspecified; E11.65-Type 2 diabetes mellitus with hyperglycemia; Z79.4-Long term (current) use of ins  COMPARISON: None  DOSE LENGTH PRODUCT: 1783 mGy cm. Automated exposure control was also utilized to decrease patient radiation dose.  TECHNIQUE: Axial images of the abdomen pelvis are performed without IV contrast. Patient received IV contrast for CTA chest approximately 10 hours prior to the CT abdomen pelvis study, therefore no additional IV contrast administered at this time. There is artifact associated with the nonelevated overlying upper extremities.   FINDINGS:  Please refer to CT chest report for discussion of pleural effusion, cardiomegaly, and basilar atelectasis.  The nonenhanced liver, spleen, pancreas are unremarkable. Gallbladder is moderately distended with no pericholecystic inflammation or biliary dilatation. Minimal prominence of the adrenal glands which maintain their adreniform shape without suspicious nodularity. Excreted contrast within the nondilated renal collecting systems. No abnormal perinephric fluid collection. Jeong catheter within the bladder. Bladder wall thickening may be due to underdistention, however finding can be seen with cystitis or muscle wall hypertrophy. The prostate is enlarged measuring 6 cm in width.  There is no free intraperitoneal air loculated abscess. There is a normal appendix. No small bowel dilatation. Nasogastric tube tip present within the gastric antrum. There is mild stranding of the subcutaneous fat of the anterior abdominal wall, greatest within the right lower abdominal wall. No loculated soft tissue abscess collection. Moderate dense diffuse vascular calcification with no regional aneurysm. No pathologic lymphadenopathy.  Degenerative changes greatest within the lower lumbar spine.      Impression: 1. Nonenhanced CT exam with artifact created from the nonelevated overlying upper extremities. Nasogastric tube tip present in the distal decompressed stomach. No evidence for bowel obstruction. Appendix normal. No free air or abscess. 2. Jeong catheter within the bladder. Diffuse bladder wall thickening may be due to underdistention, however cystitis and/or muscle wall hypertrophy considered with prostate enlargement. 3. Moderate atherosclerosis. No regional aneurysm. 4. Stranding of the subcutaneous tissues of the abdominal wall, most notable in the right lower quadrant, correlation for possible cellulitis recommended. Nonuniform soft tissue edema/anasarca considered. This report was finalized on 04/17/2023  12:02 by Dr. Coleen Terry MD.    CT Head Without Contrast    Result Date: 4/17/2023  EXAMINATION: CT HEAD WO CONTRAST-   4/17/2023 11:31 AM CDT  HISTORY: change in mental status; S06.4X0A-Epidural hemorrhage without loss of consciousness, initial encounter; Z74.09-Other reduced mobility; Z98.890-Other specified postprocedural states; T81.42XA-Infection following a procedure, deep incisional surgical site, initial encounter; D32.9-Benign neoplasm of meninges, unspecified; E11.65-Type 2 diabetes mellitus with hyperglycemia; Z79.4-Long term (current)  In order to have a CT radiation dose as low as reasonably achievable Automated Exposure Control was utilized for adjustment of the mA and/or KV according to patient size.  DLP in mGycm= 453  The CT scan of the head is performed without contrast enhancement.  Images are acquired in axial plane and subsequent reconstruction in coronal and sagittal planes.  Comparison is made with the previous study dated 04/12/2023.  There is interval significant resolution of the right extra-axial heterogeneous density fluid accumulation/hematoma since the previous study. There is a small residual subgaleal heterogeneous density fluid with small amount of air/air bubbles is noted.  There is resolution of significant extrinsic pressure on the right frontal lobe and the right lateral ventricle anterior horn since the previous study.  Diffuse decreased attenuation of the right frontal lobe may suggest vasogenic edema. However there is no significant effacement of cortical sulci. This may partly be due to adjacent high density bone and high density extra-axial fluid.  There is resolution of the extensive impression on the anterior horn of the right lateral ventricle. There is no midline shift in the presence study.  An area of encephalomalacia in the right frontal lobe is similar to the previous study. The gray-white matter differentiation is maintained the remaining brain.  Moderately  prominent ventricles, basal cisterns and cortical sulci suggesting moderate chronic volume loss.  Images reviewed in bone window show right frontal craniotomy similar to the previous study. No acute bony abnormality. There is a mild ethmoid and sphenoid sinusitis. The frontal sinuses are poorly developed. Mastoid air cells are clear.      Impression: 1. A Significant improvement in the right frontal lobe as detailed above. 2. Right frontal craniotomy. Right frontal encephalomalacia stable since the previous study.            This report was finalized on 04/17/2023 12:01 by Dr. Michelle Vigil MD.    XR Chest 1 View    Result Date: 4/19/2023  EXAM/TECHNIQUE: XR CHEST 1 VW-  INDICATION: Intubated and sedated patient, epidural hemorrhage  COMPARISON: Prior day  FINDINGS:  Endotracheal tube and RIGHT IJ CVL are stable in position. Enteric tube terminates below the diaphragm out of the field of view.  Cardiac silhouette is prominent but stable. No change in layering bilateral pleural effusions and diffuse interstitial and airspace opacity. No evidence of pneumothorax.      Impression:  No change in appearance of the chest. This report was finalized on 04/19/2023 06:52 by Dr. Mk Chinchilla MD.    XR Chest 1 View    Result Date: 4/18/2023  EXAMINATION:  XR CHEST 1 VW-  4/18/2023 3:20 AM CDT  HISTORY: Intubated and sedated; S06.4X0A-Epidural hemorrhage without loss of consciousness, initial encounter; Z74.09-Other reduced mobility; Z98.890-Other specified postprocedural states; T81.42XA-Infection following a procedure, deep incisional surgical site, initial encounter; D32.9-Benign neoplasm of meninges, unspecified; E11.65-Type 2 diabetes mellitus with hyperglycemia.  COMPARISON: 04/17/2023.  TECHNIQUE: Single view AP image.  FINDINGS:  There have been no tube or line changes. There is consolidation at the left lung base. The left hemidiaphragm is partially obscured. There is blunting of the costophrenic angles. There are  mild hazy infiltrates in the mid and lower lung zones. Heart size is mildly prominent.       Impression: 1. New dense consolidation at the left lung base with left hemidiaphragm obscured. Atelectasis versus pneumonia. 2. Mild hazy infiltrates in the mid and lower lung zones. 3. Blunting of the costophrenic angles bilaterally. Probable small pleural effusions.   This report was finalized on 04/18/2023 07:10 by Dr. Arian Stauffer MD.    US Venous Doppler Lower Extremity Bilateral (duplex)    Result Date: 4/17/2023  History: Swelling      Impression: Impression: There is no evidence of deep venous thrombosis or superficial thrombophlebitis of right or left lower extremities.  Comments: Bilateral lower extremity venous duplex exam was performed using color Doppler flow, Doppler waveform analysis, and grayscale imaging, with and without compression. There is no evidence of deep venous thrombosis in the common femoral, superficial femoral, popliteal, peroneal, anterior tibial, and posterior tibial veins bilaterally. No thrombus is identified in the saphenofemoral junctions and greater saphenous veins bilaterally.   This report was finalized on 04/17/2023 15:30 by Dr. Asif Fry MD.    XR Abdomen KUB    Result Date: 4/17/2023  EXAM/TECHNIQUE: XR ABDOMEN KUB-  INDICATION: OG Placement verification; S06.4X0A-Epidural hemorrhage without loss of consciousness, initial encounter; Z74.09-Other reduced mobility; Z98.890-Other specified postprocedural states; T81.42XA-Infection following a procedure, deep incisional surgical site, initial encounter; D32.9-Benign neoplasm of meninges, unspecified; E11.65-Type 2 diabetes mellitus with hyperglycemia; Z79.4-Long term (curren  COMPARISON: Same-day chest radiograph  FINDINGS:  Enteric tube is in the mid stomach. Visualized bowel gas pattern is nonobstructive in nature. Partially imaged cardiac resuscitation pads and atelectasis and/or small effusions in the lower chest. No acute  osseous finding.      Impression:  Enteric tube tip is in the mid stomach. This report was finalized on 04/17/2023 10:06 by Dr. Mk Chinchilla MD.     Personal review of imaging: Reviewed imaging study and agree with current interpretation      Pulmonary Assessment:  1. Acute on chronic hypoxic respiratory failure with increasing oxygen requirement  2. Bilateral pulm infiltrate likely congestive diastolic heart failure  3. Left lower lobe pneumonia likely healthcare-associated pneumonia  4. Pulmonary edema and volume overload  5. Lung nodules and chronic scarring in the lung  6. Atrial fibrillation on Cardizem now  7. COPD with history of tobacco abuse  8. Meningioma s/p craniotomy.    9. S/p washout craniotomy and cranioplasty with flap infection  10. Hypertension  11. Diabetes mellitus insulin-dependent  12. Coronary artery disease  13. Obesity likely obstructive sleep apnea  14. Seizure disorder  15. Chronic pain     Recommend/plan:   • Patient was seen in follow-up visit in CCU today.  Patient is sedated on ventilator without any dyssynchrony  • He is on full assist-control volume control ventilation.  Current ventilator settings as follows  • Assist control/volume control ventilation, tidal volume 500, rate 14, PEEP of 5, [id number]%,  • Had fever last night 102 °F.  He is already on Merrem and levofloxacin.  White cell count improved  • He is already started on meropenem.  Follow culture results and de-escalate antibiotics  • No spontaneous breathing trial till patient is stable.  Care plan discussed with RT and RN.  • Continue gentle diuresis for heart failure and monitor renal function electrolytes.  • Imaging studies reviewed.  Patient had craniotomy wound in place..  ID following antibiotics  • Routine respiratory care and bronchodilator treatment. He is on Pulmicort neb and DuoNeb  • CT of the chest angiogram and CT head reviewed.  Neurosurgery following.  • DVT and stress ulcer was addressed.  Patient is  unable to get any Lovenox due to risk of bleeding.  • Management of craniotomy per Dr. Portillo.  Propofol for sedation.  • Continue nutritional support.  Repeat labs imaging studies from time to time  • CODE STATUS: Full.  Overall prognosis: Guarded.  • We will follow.    This visit was performed by both a physician and an [facility] RN.  I personally evaluated and examined the patient.  I performed all aspects of the medical decision making as documented.    Electronically signed by     Tracy Gupta MD,  Pulmonologist/Intensivist   4/19/2023, 10:15 CDT

## 2023-04-19 NOTE — PROGRESS NOTES
Neurosurgery Daily Progress Note    HPI:  Elijah Escobar is a 67 y.o. male with a significant medical history of hypertension, diabetes, hyperlipidemia, seizures, craniotomy for tumor (6/16/2022), craniotomy for washout (7/1/2022), craniectomy (10/4/2022), coronary artery disease, diabetes, erectile dysfunction, GERD, hypertension, hyperlipidemia, tobacco abuse, and obesity.  He presents today with a complaint of a 4-5 days onset of progressively worsening right frontal, temporal, and periorbital edema and associated symptoms to include, but not limited to generalized fatigue, chills, dyspnea, intermittent nausea without vomiting, and states he's felt feverish.  Physical exam findings of neurologically intact with right frontal, temporal, and periorbital swelling.  WBC elevated at 15.  3 to an CRP elevated at 14.75.  Imaging pending.    Assessment:   Past Medical History:   Diagnosis Date   • Brain tumor    • Coronary artery disease    • COVID-19 vaccine series completed     MODERNA X 3; LAST DOSE 3/2022   • Diabetes    • Erectile dysfunction    • GERD (gastroesophageal reflux disease)    • Hypercholesteremia    • Hypertension    • Seizure      Active Hospital Problems    Diagnosis    • **Swelling of scalp    • Elevated liver function tests    • COPD (chronic obstructive pulmonary disease)    • Obesity (BMI 30-39.9)    • Tobacco abuse, in remission    • Acute systolic heart failure    • ADDIE (obstructive sleep apnea)    • Type 2 myocardial infarction due to arrhythmia    • Acute pulmonary edema due to A-fib RVR    • Acute respiratory failure with hypoxia    • Intracranial epidural hematoma    • Post-operative infection    • Paroxysmal atrial fibrillation with rapid ventricular response    • Type 2 diabetes mellitus with hyperglycemia and peripheral neuropathy, with long-term current use of insulin (HCC)    • Coronary artery disease    • Meningioma s/p craniotomy      Plan:   Neuro: Continue decline in neuro status.   With sedation paused for 3+ hours, gag and cough intact, pupillary response intact, does not follow commands, no motor response to painful stimuli.    Infected cranial flap      POD #14 (4/4/2023)  craniectomy and washout    Postop CT stable. No acute abnormalities.    Flap tapped I&D cultures 4+ gram-negative bacilli    Heavy 4+ Serratia marcescens     7 Days Post-Op (4/13/2023) evacuation of scalp hematoma    JUAN removed     Continue neuro exams per policy.  Call for decline  CT head reviewed.  Stable.  No acute abnormalities.  Stat MRI brain    CV: Code for PEA yesterday afternoon, 4/16/2023.  Required epinephrine and bicarb   Currently on Levophed   AFib/flutter, rate controlled.   Hold all anticoagulation for 2 weeks.   No evidence of DVT or PE per imaging   Appreciate cardiology  Pulm: Remains intubated on low-dose propofol.  Tentative plan for weaning trial today   Atelectasis versus pneumonia left lung      :  Voiding per Joeng catheter.  Trace leuks.  TNTC WBC  FEN: Currently NPO  Endocrine: Glucose improving.  Continue SSI  GI: SHERIF.  +BM  ID:  Infected cranial flap  CSF: no growth to date   PNA.  Resolved   UTI.  Resolved   Continue meropenem per ID.  Appreciate assist  Heme:  DVT prophylaxis with SCDs.  Hold all anticoagulation for 2 weeks.  Pain: No complaints at present  Dispo: Pending extubation     Chief complaint:   4-5 days onset of progressively worsening right frontal, temporal, and periorbital edema and associated symptoms to include, but not limited to generalized fatigue, chills, dyspnea, intermittent nausea without vomiting, and states he's felt feverish.    HPI  Subjective  Confusion and slurred speech    Temp:  [98.6 °F (37 °C)-102.3 °F (39.1 °C)] 99.7 °F (37.6 °C)  Heart Rate:  [] 111  Resp:  [17-26] 26  BP: (120-174)/(54-91) 146/67  FiO2 (%):  [30 %-60 %] 40 %    Output by Drain (mL) 04/18/23 0701 - 04/18/23 1900 04/18/23 1901 - 04/19/23 0700 04/19/23 0701 - 04/19/23 1246 Range  "Total   Requested LDAs do not have output data documented.     Objective:  Vital signs: (most recent): Blood pressure 146/67, pulse 111, temperature 99.7 °F (37.6 °C), temperature source Axillary, resp. rate 26, height 177.8 cm (70\"), weight 123 kg (272 lb), SpO2 100 %.      Neurologic Exam     Mental Status      With sedation paused for 3+ hours, gag and cough intact, pupillary response intact, does not follow commands, no motor response to painful stimuli.         Cranial Nerves     CN III, IV, VI   Pupils are equal, round, and reactive to light.    CN IX, X   Right gag reflex: normal  Left gag reflex: normal    Motor Exam   No response to painful stimuli.     Drains: * No LDAs found *    Imaging Results (Last 24 Hours)     Procedure Component Value Units Date/Time    XR Chest 1 View [[id number]] Collected: 04/19/23 0651     Updated: 04/19/23 0656    Narrative:      EXAM/TECHNIQUE: XR CHEST 1 VW-     INDICATION: Intubated and sedated patient, epidural hemorrhage     COMPARISON: Prior day     FINDINGS:     Endotracheal tube and RIGHT IJ CVL are stable in position. Enteric tube  terminates below the diaphragm out of the field of view.     Cardiac silhouette is prominent but stable. No change in layering  bilateral pleural effusions and diffuse interstitial and airspace  opacity. No evidence of pneumothorax.       Impression:         No change in appearance of the chest.  This report was finalized on 04/19/2023 06:52 by Dr. Mk Chinchilla MD.        Lab Results (last 24 hours)     Procedure Component Value Units Date/Time    Genital Culture - Swab, Penis [[id number]] Collected: 04/19/23 0936    Specimen: Swab from Penis Updated: 04/19/23 1234     Gram Stain Few (2+) WBCs seen      No organisms seen    T. Pallidum (Syphilis) Screening Cascade [[id number]] Collected: 04/19/23 1001    Specimen: Blood Updated: 04/19/23 1014    Chlamydia trachomatis, Neisseria gonorrhoeae, PCR - Swab, Penis [[id number]] Collected: 04/19/23 " 0936    Specimen: Swab from Penis Updated: 04/19/23 1000    POC Glucose Once [[id number]]  (Abnormal) Collected: 04/19/23 0756    Specimen: Blood Updated: 04/19/23 0807     Glucose 172 mg/dL      Comment: : [id number] Damon Rodrigues ID: [id number]       Comprehensive Metabolic Panel [[id number]]  (Abnormal) Collected: 04/19/23 0657    Specimen: Blood Updated: 04/19/23 0740     Glucose 139 mg/dL      BUN 16 mg/dL      Creatinine 1.26 mg/dL      Sodium 138 mmol/L      Potassium 4.2 mmol/L      Comment: Slight hemolysis detected by analyzer. Results may be affected.        Chloride 101 mmol/L      CO2 24.0 mmol/L      Calcium 7.7 mg/dL      Total Protein 5.4 g/dL      Albumin 2.4 g/dL      ALT (SGPT) 60 U/L      AST (SGOT) 17 U/L      Comment: Slight hemolysis detected by analyzer. Results may be affected.        Alkaline Phosphatase 89 U/L      Total Bilirubin 0.5 mg/dL      Globulin 3.0 gm/dL      A/G Ratio 0.8 g/dL      BUN/Creatinine Ratio 12.7     Anion Gap 13.0 mmol/L      eGFR 62.5 mL/min/1.73     Narrative:      GFR Normal >60  Chronic Kidney Disease <60  Kidney Failure <15      CBC & Differential [[id number]]  (Abnormal) Collected: 04/19/23 0657    Specimen: Blood Updated: 04/19/23 0716    Narrative:      The following orders were created for panel order CBC & Differential.  Procedure                               Abnormality         Status                     ---------                               -----------         ------                     CBC Auto Differential[[id number]]        Abnormal            Final result                 Please view results for these tests on the individual orders.    CBC Auto Differential [[id number]]  (Abnormal) Collected: 04/19/23 0657    Specimen: Blood Updated: 04/19/23 0716     WBC 13.61 10*3/mm3      RBC 3.42 10*6/mm3      Hemoglobin 10.2 g/dL      Hematocrit 33.8 %      MCV 98.8 fL      MCH 29.8 pg      MCHC 30.2 g/dL      RDW 13.9 %      RDW-SD 49.8 fl      MPV 10.5  fL      Platelets 163 10*3/mm3      Neutrophil % 77.0 %      Lymphocyte % 11.8 %      Monocyte % 10.1 %      Eosinophil % 0.1 %      Basophil % 0.3 %      Immature Grans % 0.7 %      Neutrophils, Absolute 10.49 10*3/mm3      Lymphocytes, Absolute 1.60 10*3/mm3      Monocytes, Absolute 1.37 10*3/mm3      Eosinophils, Absolute 0.01 10*3/mm3      Basophils, Absolute 0.04 10*3/mm3      Immature Grans, Absolute 0.10 10*3/mm3      nRBC 0.0 /100 WBC     POC Glucose Once [[id number]]  (Abnormal) Collected: 04/19/23 0611    Specimen: Blood Updated: 04/19/23 0622     Glucose 170 mg/dL      Comment: : [id number] [facility] NicolasMeter ID: [id number]       Blood Gas, Arterial - [[id number]]  (Abnormal) Collected: 04/19/23 0352    Specimen: Arterial Blood Updated: 04/19/23 0351     Site Right Radial     Marek's Test Positive     pH, Arterial 7.484 pH units      Comment: 83 Value above reference range        pCO2, Arterial 36.9 mm Hg      pO2, Arterial 62.4 mm Hg      Comment: 84 Value below reference range        HCO3, Arterial 27.7 mmol/L      Comment: 83 Value above reference range        Base Excess, Arterial 4.1 mmol/L      Comment: 83 Value above reference range        O2 Saturation, Arterial 92.7 %      Comment: 84 Value below reference range        Temperature 37.0 C      Barometric Pressure for Blood Gas 748 mmHg      Modality Ventilator     FIO2 30 %      Ventilator Mode AC     Set Tidal Volume 500     Set Mech Resp Rate 16.0     PEEP 7.5     Collected by [id number]     Comment: Meter: [id number]     :  [id number]        pCO2, Temperature Corrected 36.9 mm Hg      pH, Temp Corrected 7.484 pH Units      pO2, Temperature Corrected 62.4 mm Hg     Urinalysis, Microscopic Only - Indwelling Urethral Catheter [[id number]]  (Abnormal) Collected: 04/19/23 0307    Specimen: Urine from Indwelling Urethral Catheter Updated: 04/19/23 0317     RBC, UA Too Numerous to Count /HPF      WBC, UA 21-30 /HPF      Bacteria, UA None  Seen /HPF      Squamous Epithelial Cells, UA 0-2 /HPF      Hyaline Casts, UA 0-2 /LPF      Methodology Automated Microscopy    Urinalysis With Culture If Indicated - Indwelling Urethral Catheter [[id number]]  (Abnormal) Collected: 04/19/23 0307    Specimen: Urine from Indwelling Urethral Catheter Updated: 04/19/23 0317     Color, UA Dark Yellow     Appearance, UA Cloudy     pH, UA <=5.0     Specific Gravity, UA >1.030     Glucose, UA Negative     Ketones, UA 15 mg/dL (1+)     Bilirubin, UA Negative     Blood, UA Moderate (2+)     Protein, UA 30 mg/dL (1+)     Leuk Esterase, UA Trace     Nitrite, UA Negative     Urobilinogen, UA 1.0 E.U./dL    Narrative:      In absence of clinical symptoms, the presence of pyuria, bacteria, and/or nitrites on the urinalysis result does not correlate with infection.    Urine Culture - Urine, Indwelling Urethral Catheter [[id number]] Collected: 04/19/23 0307    Specimen: Urine from Indwelling Urethral Catheter Updated: 04/19/23 0317    Blood Culture - Blood, Hand, Right [[id number]] Collected: 04/19/23 0209    Specimen: Blood from Hand, Right Updated: 04/19/23 0216    Blood Culture - Blood, Hand, Left [[id number]] Collected: 04/19/23 0207    Specimen: Blood from Hand, Left Updated: 04/19/23 0216    POC Glucose Once [[id number]]  (Normal) Collected: 04/18/23 2007    Specimen: Blood Updated: 04/18/23 2018     Glucose 102 mg/dL      Comment: : [id number] Malcolm ZamoralasMeter ID: [id number]       POC Glucose Once [[id number]]  (Normal) Collected: 04/18/23 1723    Specimen: Blood Updated: 04/18/23 1734     Glucose 82 mg/dL      Comment: : [id number] Raymond CoronaMeter ID: [id number]           ADITYA Cespedes

## 2023-04-19 NOTE — PROGRESS NOTES
"Pharmacy Dosing Service  Pharmacokinetics  Vancomycin Follow-up Evaluation    Assessment/Action/Plan:  Current Order: Vancomycin 750 mg IVPB every 12 hours  Current end date:4/24/2023  Levels: Vancomycin trough ordered before dose due on 4/20/2023 at 1500  Additional antimicrobial agent(s): Merrem  SCr = 1.26 (down from 1.49)  Vancomycin dose adjusted to 1000 mg iv q12h. Pharmacy will continue to follow daily and adjust dose accordingly.     Subjective:  Elijah Escobar is a 67 y.o. male currently on Vancomycin for the treatment of sepsis, day 1 of 6 of treatment.    AUC Model Data:  Regimen: 1000 mg IV every 12 hours.  Start time: 15:00 on 04/19/2023  Exposure target: AUC24 (range)400-600 mg/L.hr   AUC24,ss: 508 mg/L.hr  PAUC*: 75 %  Ctrough,ss: 17.5 mg/L  Pconc*: 37 %  Tox.: 13 %      Objective:  Ht: 177.8 cm (70\"); Wt: 123 kg (272 lb)  Estimated Creatinine Clearance: 74.8 mL/min (by C-G formula based on SCr of 1.26 mg/dL).   Creatinine   Date Value Ref Range Status   04/19/2023 1.26 0.76 - 1.27 mg/dL Final   04/18/2023 1.49 (H) 0.76 - 1.27 mg/dL Final   04/17/2023 1.44 (H) 0.76 - 1.27 mg/dL Final   08/31/2022 0.80 0.60 - 1.30 mg/dL Final     Comment:     Serial Number: [id number]:  [id number]   10/21/2020 1.03 0.60 - 1.30 mg/dL Final   02/07/2020 1.00 0.60 - 1.30 mg/dL Final   02/07/2020 1.00 0.60 - 1.30 mg/dL Final      Lab Results   Component Value Date    WBC 13.61 (H) 04/19/2023    WBC 16.70 (H) 04/18/2023    WBC 17.15 (H) 04/17/2023         Culture Results:  Microbiology Results (last 10 days)       ** No results found for the last 240 hours. **            Mk Stahl, PharmD   04/19/23 08:13 CDT    "

## 2023-04-19 NOTE — PLAN OF CARE
Goal Outcome Evaluation:         Pt spiked a temp of 102.3 at 2000. Notified MD Santos, ordered vanco, blood cultures and UA. Administered tylenol, removed blankets and decreased temp in room, pt's temp eventually dropped to 100.0. Urine became cloudy by end of shift with sediment in base of patel. Urine output good. Propofol at 30.

## 2023-04-19 NOTE — PLAN OF CARE
Goal Outcome Evaluation:  Plan of Care Reviewed With: patient, son           Outcome Evaluation: pt remains on vent with some o2 sat drops requiring bagging lavage and suction increase in fio2 from 30-60 % today. pt minimally responsive this am not following commands w/d only lower ext cough gag and pupillary response intact. sedation vacations performed with neuro assessments every 4 hours Md and Np with neuro updated mri  today and results reviewed by md/np. temps down 99.7 max today. good urine output w/ patel in place cont iv lasix scheduled.

## 2023-04-19 NOTE — SIGNIFICANT NOTE
04/19/23 0828   Readiness to Wean Daily Screen   Daily Screen Complete Y   Daily Screen Outcome fail     Not ready to wean due to fever.

## 2023-04-20 NOTE — PLAN OF CARE
Goal Outcome Evaluation:  Plan of Care Reviewed With: patient        Progress: improving  Outcome Evaluation: Performed PROM BLE and BUE exs. Dep x2 to roll and scoot up in bed.

## 2023-04-20 NOTE — PLAN OF CARE
Goal Outcome Evaluation:  Plan of Care Reviewed With: patient        Progress: no change  Outcome Evaluation: Sedation remains off, not following commands, withdraws bilater lower extremeties. Tolerating tube feeds, turn q2

## 2023-04-20 NOTE — PLAN OF CARE
Goal Outcome Evaluation:  Plan of Care Reviewed With: patient, family        Progress: no change  Outcome Evaluation: Ntn follow up. Pt remains on vent support. Has not been on propofol sedation today. Reassessed nutrient needs with current vent settings. Current TF not meeting est'd calorie needs. Recommend to change TF to optimize nutrition. Recommend to change TF to Peptamen AF. Begin at 45mL/hour, increase to goal rate of 55mL/hour after 4 hours. Decrease water flush to 20mL every hour via pump. Will follow to monitor nutrition POC.

## 2023-04-20 NOTE — PROGRESS NOTES
RT EQUIPMENT DEVICE RELATED - SKIN ASSESSMENT    RT Medical Equipment/Device:     ETT Martin/Anchorfast    Skin Assessment:      Cheek:  Intact  Neck:  Intact  Mouth:  Intact    Device Skin Pressure Protection:  Skin-to-device areas padded:  Anchorfast    Nurse Notification:  Kylie Hayden, RRT

## 2023-04-20 NOTE — CASE MANAGEMENT/SOCIAL WORK
Continued Stay Note   [place]     Patient Name: Elijah Escobar  MRN: [id number]  Today's Date: 4/20/2023    Admit Date: 3/31/2023    Plan: LTAC?   Discharge Plan     Row Name 04/20/23 0906       Plan    Plan LTAC?    Plan Comments Patient continues on vent.  Would patient benefit from LTAC placement?               Discharge Codes    No documentation.                     TIANA Syed

## 2023-04-20 NOTE — NURSING NOTE
At 2030, pt put on sedation vacation. Pt would open eyes and wince to painful stimuli and oral suctioning but would not withdraw from pain, localize pain or follow commands. Pt did not respond to vigorous stimuli or sternal rub. Pt neuro status unchanged for 2 hrs, during which propofol remained on hold. SBP increased to 150-160's. At 2235, pt began fighting vent, respirations increased, O2 sats dropped to high 80's. Propofol restarted at 20. Pt continued to fight vent for about 30 minutes after restarting sedation. VS stable at this time.

## 2023-04-20 NOTE — PLAN OF CARE
Goal Outcome Evaluation:      Pt did not withdraw from pain while on sedation. Sedation paused for 2 hours starting at 2030, see previous nursing note for details. Sedation paused for 30 minutes at 0000, no change in neuro status or VS. Sedation paused for 30 minutes at 0400, O2 sats dropped to high 80's with increased respirations. Propofol currently at 15. Secretions thin and scant. Pt in afib throughout shift, HR in 80-90's for several hours after administering cardizem, 110-130's after that. Urine output >1000 mLs, orange in color, green penile present. Catheter care performed q4h.

## 2023-04-20 NOTE — SIGNIFICANT NOTE
04/20/23 0859   Readings   Plateau Pressure (cm H2O) 15 cm H2O   Driving Pressure (cm H2O) 10.8 cm H2O   Static Compliance (L/cm H2O) 48   Dynamic Compliance (L/cm H2O) 99 L/cm H2O

## 2023-04-20 NOTE — PROGRESS NOTES
RT EQUIPMENT DEVICE RELATED - SKIN ASSESSMENT    RT Medical Equipment/Device:     ETT Martin/Anchorfast    Skin Assessment:      Cheek:  Intact  Neck:  Intact  Lips:  Intact  Mouth:  Intact    Device Skin Pressure Protection:  Skin-to-device areas padded:  Anchorfast    Nurse Notification:  No Phyllis Dakin, RRT

## 2023-04-20 NOTE — THERAPY TREATMENT NOTE
Acute Care - Physical Therapy Treatment Note  [facility]     Patient Name: Elijah Escobar  : 1955  MRN: [id number]  Today's Date: 2023      Visit Dx:     ICD-10-CM ICD-9-CM   1. Intracranial epidural hematoma  S06.4X0A 852.40   2. Impaired mobility  Z74.09 799.89   3. S/P craniotomy  Z98.890 V45.89   4. Infection of deep incisional surgical site after procedure, initial encounter  T81.42XA 998.59   5. Meningioma s/p craniotomy  D32.9 225.2   6. Type 2 diabetes mellitus with hyperglycemia and peripheral neuropathy, with long-term current use of insulin (HCC)  E11.65 250.00    Z79.4 790.29     V58.67   7. Paroxysmal atrial fibrillation with rapid ventricular response  I48.0 427.31   8. Swelling of scalp  R22.0 784.2   9. Acute pulmonary edema due to A-fib RVR  J81.0 518.4   10. Acute respiratory failure with hypoxia  J96.01 518.81   11. Type 2 myocardial infarction due to arrhythmia  I49.9 427.9    I21.A1 410.90   12. Obesity (BMI 30-39.9)  E66.9 278.00   13. Tobacco abuse, in remission  F17.201 305.1   14. Acute systolic heart failure  I50.21 428.21   15. ADDIE (obstructive sleep apnea)  G47.33 327.23   16. Secondary hypertension  I15.9 405.99   17. Gastroesophageal reflux disease, unspecified whether esophagitis present  K21.9 530.81   18. Class 2 severe obesity due to excess calories with serious comorbidity and body mass index (BMI) of 38.0 to 38.9 in adult  E66.01 278.01    Z68.38 V85.38   19. Leukocytosis, unspecified type  D72.829 288.60   20. Anemia due to other cause, not classified  D64.89 285.8   21. Smoker  F17.200 305.1   22. History of cranioplasty  Z98.890 V45.89   23. Facial edema  R60.0 782.3   24. Coronary artery disease due to lipid rich plaque  I25.10 414.00    I25.83 414.3   25. Postoperative infection, unspecified type, initial encounter  T81.40XA 998.59   26. Chronic obstructive pulmonary disease, unspecified COPD type  J44.9 496     Patient Active Problem List   Diagnosis   •  Meningioma s/p craniotomy   • Hypertension   • GERD (gastroesophageal reflux disease)   • Coronary artery disease   • Class 2 severe obesity due to excess calories with serious comorbidity and body mass index (BMI) of 38.0 to 38.9 in adult   • Type 2 diabetes mellitus with hyperglycemia and peripheral neuropathy, with long-term current use of insulin (HCC)   • Leukocytosis   • Other specified anemias   • Paroxysmal atrial fibrillation with rapid ventricular response   • Post-operative infection   • Smoker   • History of cranioplasty   • Facial edema   • Swelling of scalp   • Acute pulmonary edema due to A-fib RVR   • Acute respiratory failure with hypoxia   • Type 2 myocardial infarction due to arrhythmia   • COPD (chronic obstructive pulmonary disease)   • Obesity (BMI 30-39.9)   • Tobacco abuse, in remission   • Acute systolic heart failure   • ADDIE (obstructive sleep apnea)   • Intracranial epidural hematoma   • Elevated liver function tests     Past Medical History:   Diagnosis Date   • Brain tumor    • Coronary artery disease    • COVID-19 vaccine series completed     MODERNA X 3; LAST DOSE 3/2022   • Diabetes    • Erectile dysfunction    • GERD (gastroesophageal reflux disease)    • Hypercholesteremia    • Hypertension    • Seizure      Past Surgical History:   Procedure Laterality Date   • BALLOON ANGIOPLASTY, ARTERY Right    • COLONOSCOPY     • CRANIECTOMY Right 7/1/2022    Procedure: CRANIECTOMY WITH INCISIONAL WASHOUT;  Surgeon: Felipe Banerjee MD;  Location:  [facility];  Service: Neurosurgery;  Laterality: Right;   • CRANIOPLASTY Right 10/4/2022    Procedure: CRANIOPLASTY right with Lumbar drain;  Surgeon: Flaco Portillo MD;  Location:  [facility];  Service: Neurosurgery;  Laterality: Right;   • CRANIOPLASTY N/A 4/4/2023    Procedure: CRANIOPLASTY, removal, right, horseshoe;  Surgeon: Flaco Portillo MD;  Location:  [facility];  Service: Neurosurgery;  Laterality: N/A;   • CRANIOTOMY Right  4/12/2023    Procedure: CRANIOTOMY; evacuation of right hematoma;  Surgeon: Flaco Portillo MD;  Location:  [facility];  Service: Neurosurgery;  Laterality: Right;   • CRANIOTOMY FOR TUMOR Right 6/16/2022    Procedure: CRANIOTOMY FOR TUMOR STERIOTACTIC WITH BRAIN LAB, right;  Surgeon: Flaco Portillo MD;  Location:  [facility];  Service: Neurosurgery;  Laterality: Right;     PT Assessment (last 12 hours)     PT Evaluation and Treatment     Row Name 04/20/23 0922          Physical Therapy Time and Intention    Document Type therapy note (daily note)  -TB     Mode of Treatment physical therapy  -TB     Comment Sedated/Vent  -TB     Row Name 04/20/23 0922          General Information    Existing Precautions/Restrictions fall;oxygen therapy device and L/min  Helmet OOB, BUE restraints  -TB     Row Name 04/20/23 0922          Bed Mobility    Bed Mobility rolling left;rolling right;scooting/bridging  -TB     Rolling Left [place] (Bed Mobility) dependent (less than 25% patient effort);2 person assist  -TB     Rolling Right [place] (Bed Mobility) dependent (less than 25% patient effort);2 person assist  -TB     Scooting/Bridging [place] (Bed Mobility) dependent (less than 25% patient effort);2 person assist  -TB     Row Name 04/20/23 0922          Motor Skills    Comments, Therapeutic Exercise PROM BLE and BUE  -TB     Row Name             Wound 04/04/23 1111 Right temporal region Incision    Wound - Properties Group Placement Date: 04/04/23  -CHRISTIANO Placement Time: 1111  -CHRISTIANO Side: Right  -CHRISTIANO Location: temporal region  -CHRISTIANO Primary Wound Type: Incision  -CHRISTIANO    Retired Wound - Properties Group Placement Date: 04/04/23  -CHRISTIANO Placement Time: 1111  -CHRISTIANO Side: Right  -CHRISTIANO Location: temporal region  -CHRISTIANO Primary Wound Type: Incision  -CHRISTIANO    Retired Wound - Properties Group Date first assessed: 04/04/23  -CHRISTIANO Time first assessed: 1111  -CHRISTIANO Side: Right  -CHRISTIANO Location: temporal region  -CHRISTIANO Primary Wound Type:  Incision  -CHRISTIANO    Row Name             Wound 04/12/23 1622 Right scalp Incision    Wound - Properties Group Placement Date: 04/12/23  -SAFIA Placement Time: 1622 -SAFIA Side: Right  -SAFIA Location: scalp  -SAFIA Primary Wound Type: Incision  -SAFIA    Retired Wound - Properties Group Placement Date: 04/12/23  -SAFIA Placement Time: 1622 -SAFIA Side: Right  -SAFIA Location: scalp  -SAFIA Primary Wound Type: Incision  -SAFIA    Retired Wound - Properties Group Date first assessed: 04/12/23  -SAFIA Time first assessed: 1622 -SAFIA Side: Right  -SAFIA Location: scalp  -SAFIA Primary Wound Type: Incision  -SAFIA    Row Name 04/20/23 0922          Plan of Care Review    Plan of Care Reviewed With patient  -TB     Outcome Evaluation Performed PROM BLE and BUE exs. Dep x2 to roll and scoot up in bed.  -TB     Row Name 04/20/23 0922          Positioning and Restraints    Pre-Treatment Position in bed  -TB     Post Treatment Position bed  -TB     In Bed notified nsg;side lying left;side rails up x2;RUE elevated;LUE elevated;legs elevated;heels elevated;SCD pump applied  -TB     Restraints released:;reapplied:  -TB           User Key  (r) = Recorded By, (t) = Taken By, (c) = Cosigned By    Initials Name Provider Type    Kierra Conroy, RN Registered Nurse    Ricky George RN Registered Nurse    Tere Berger, KYALH Physical Therapist Assistant                Physical Therapy Education     Title: PT OT SLP Therapies (In Progress)     Topic: Physical Therapy (In Progress)     Point: Mobility training (In Progress)     Learning Progress Summary           Patient Acceptance, E, NR by JUAN at 4/11/2023 0809    Comment: Looking ahead during gait    Acceptance, E,D, DU,VU by JUAN at 4/10/2023 1447    Comment: Safety with transfers, please take time, no impulsiveness    Acceptance, E,TB, VU by CM at 4/6/2023 1350    Acceptance, E, VU by SB at 4/5/2023 1605    Comment: helmet fit, safety, POC    Acceptance, E, VU by CHRISTIANO at 4/3/2023 1353    Comment: gait, safety,     Acceptance, E, VU by SB at 4/1/2023 1405    Comment: pt edu on POC, benefits of act, PLB and d/c plans   Family Acceptance, E,TB, VU by CM at 4/6/2023 1350                   Point: Home exercise program (In Progress)     Learning Progress Summary           Patient Acceptance, E, NR by JESSICA at 4/19/2023 0815    Comment: progression of PT    Acceptance, E,TB, VU by CM at 4/6/2023 1350   Family Acceptance, E,TB, VU by CM at 4/6/2023 1350                   Point: Body mechanics (Done)     Learning Progress Summary           Patient Acceptance, E,TB, VU by CM at 4/6/2023 1350   Family Acceptance, E,TB, VU by CM at 4/6/2023 1350                   Point: Precautions (Done)     Learning Progress Summary           Patient Acceptance, E,TB, VU by CM at 4/6/2023 1350    Acceptance, E, VU by SB at 4/5/2023 1605    Comment: helmet fit, safety, POC    Acceptance, E, VU by SB at 4/1/2023 1405    Comment: pt edu on POC, benefits of act, PLB and d/c plans   Family Acceptance, E,TB, VU by CM at 4/6/2023 1350                               User Key     Initials Effective Dates Name Provider Type Discipline    JESSICA 02/03/23 -  Bruce Pillai, PT DPT Physical Therapist PT    JUAN 02/03/23 -  Carrie Callaway PTA Physical Therapist Assistant PT    CHRISTIANO 02/03/23 -  Александр Saravia PTA Physical Therapist Assistant PT    CM 03/02/23 -  Dianelys Carlton, RN Registered Nurse Nurse    SB 06/16/21 -  Erika Javier, PT DPT Physical Therapist PT              PT Recommendation and Plan     Plan of Care Reviewed With: patient  Progress: improving  Outcome Evaluation: Performed PROM BLE and BUE exs. Dep x2 to roll and scoot up in bed.   Outcome Measures     Row Name 04/20/23 0922             How much help from another person do you currently need...    Turning from your back to your side while in flat bed without using bedrails? 1  -TB      Moving from lying on back to sitting on the side of a flat bed without bedrails? 1  -TB      Moving to and  from a bed to a chair (including a wheelchair)? 1  -TB      Standing up from a chair using your arms (e.g., wheelchair, bedside chair)? 1  -TB      Climbing 3-5 steps with a railing? 1  -TB      To walk in hospital room? 1  -TB      AM-PAC 6 Clicks Score (PT) 6  -TB         Functional Assessment    Outcome Measure Options AM-PAC 6 Clicks Basic Mobility (PT)  -TB            User Key  (r) = Recorded By, (t) = Taken By, (c) = Cosigned By    Initials Name Provider Type    Tere Berger PTA Physical Therapist Assistant                 Time Calculation:    PT Charges     Row Name 04/20/23 1147             Time Calculation    Start Time 0922  -TB      Stop Time 0946  -TB      Time Calculation (min) 24 min  -TB      PT Received On 04/20/23  -TB         Time Calculation- PT    Total Timed Code Minutes- PT 24 minute(s)  -TB            User Key  (r) = Recorded By, (t) = Taken By, (c) = Cosigned By    Initials Name Provider Type    Tere Berger PTA Physical Therapist Assistant              Therapy Charges for Today     Code Description Service Date Service Provider Modifiers Qty    [id number] HC PT THER PROC EA 15 MIN 4/20/2023 Tere Miller PTA GP 2          PT G-Codes  Outcome Measure Options: AM-PAC 6 Clicks Basic Mobility (PT)  AM-PAC 6 Clicks Score (PT): 6  AM-PAC 6 Clicks Score (OT): 20    Tere Miller PTA  4/20/2023

## 2023-04-20 NOTE — PROGRESS NOTES
Infectious Diseases Progress Note    Patient:  Elijah Escobar  YOB: 1955  MRN: [id number]   Admit date: 3/31/2023   Admitting Physician: Flaco Portillo, *  Primary Care Physician: Brianna Maritnez APRN    Chief Complaint/Interval History: Sedation has been on hold.  He is not yet responding.  He has remained without high fever over the past 48 hours.  Previously he had been as high as 102.  Last 48 hours highest temperature 100.7.  He is on no pressor support.  He is down to 40% FiO2.    Intake/Output Summary (Last 24 hours) at 4/20/2023 1334  Last data filed at 4/20/2023 0800  Gross per 24 hour   Intake 2374.7 ml   Output 2875 ml   Net -500.3 ml     Allergies: No Known Allergies  Current Scheduled Medications:   aspirin, 81 mg, Oral, Daily  atorvastatin, 20 mg, Oral, Nightly  budesonide, 0.5 mg, Nebulization, BID - RT  Chlorhexidine Gluconate Cloth, 1 application, Topical, Q24H  digoxin, 250 mcg, Oral, Daily  dilTIAZem, 90 mg, Oral, Q8H  furosemide, 40 mg, Intravenous, BID  insulin detemir, 15 Units, Subcutaneous, Q12H  insulin lispro, 0-14 Units, Subcutaneous, TID AC  ipratropium, 0.5 mg, Nebulization, 4x Daily - RT  levETIRAcetam, 500 mg, Oral, BID  [START ON 4/21/2023] levoFLOXacin, 500 mg, Oral, Q24H  meropenem, 1 g, Intravenous, Q8H  methylnaltrexone, 12 mg, Subcutaneous, Every Other Day  metoclopramide, 10 mg, Oral, 4x Daily AC & at Bedtime  metoprolol tartrate, 50 mg, Oral, Q12H  multivitamin with minerals, 1 tablet, Oral, Daily  pantoprazole, 40 mg, Intravenous, Q AM  polyethylene glycol, 17 g, Oral, Daily  sennosides-docusate, 1 tablet, Oral, Daily  sodium chloride, 10 mL, Intravenous, Q12H  sucralfate, 1 g, Oral, 4x Daily AC & at Bedtime  vancomycin, 1,000 mg, Intravenous, Q12H      Current PRN Medications:  •  acetaminophen **OR** acetaminophen **OR** acetaminophen  •  butalbital-acetaminophen-caffeine  •  calcium carbonate  •  dextrose  •  dextrose  •  glucagon (human  "recombinant)  •  hydrOXYzine  •  ipratropium  •  ipratropium-albuterol  •  LORazepam  •  nicotine  •  ondansetron **OR** ondansetron  •  oxyCODONE-acetaminophen  •  oxyCODONE-acetaminophen  •  sodium chloride  •  sodium chloride    Review of Systems unobtainable from patient due to mental status    Vital Signs:  /97   Pulse 115   Temp 99.3 °F (37.4 °C) (Axillary)   Resp 25   Ht 177.8 cm (70\")   Wt 123 kg (272 lb)   SpO2 100%   BMI 39.03 kg/m²     Physical Exam  Vital signs - reviewed.  Line/IV site - No erythema, warmth, induration, or tenderness.  Cranioplasty site without discoloration or drainage  Lungs without crackles  Abdomen soft and nondistended    Lab Results:  CBC:   Results from last 7 days   Lab Units 04/19/23  0657 04/18/23  0413 04/17/23  0308   WBC 10*3/mm3 13.61* 16.70* 17.15*   HEMOGLOBIN g/dL 10.2* 10.2* 9.7*   HEMATOCRIT % 33.8* 33.4* 31.1*   PLATELETS 10*3/mm3 163 203 227     BMP:  Results from last 7 days   Lab Units 04/19/23  0657 04/18/23  0233 04/17/23  0308 04/16/23  2320   SODIUM mmol/L 138 141 141 140   POTASSIUM mmol/L 4.2 4.6 5.3* 5.3*   CHLORIDE mmol/L 101 103 103 102   CO2 mmol/L 24.0 28.0 26.0 25.0   BUN mg/dL 16 15 15 14   CREATININE mg/dL 1.26 1.49* 1.44* 1.38*   GLUCOSE mg/dL 139* 70 165* 201*   CALCIUM mg/dL 7.7* 7.9* 8.0* 7.7*   ALT (SGPT) U/L 60* 106* 159*  --      Culture Results:   Blood Culture   Date Value Ref Range Status   04/19/2023 No growth at 24 hours  Preliminary   04/19/2023 No growth at 24 hours  Preliminary     Urine Culture   Date Value Ref Range Status   04/19/2023 No growth  Final     Radiology:     MRI brain yesterday:  IMPRESSION:  1. Prior right frontal craniectomy. Since the previous MRI, there has  been removal of a synthetic device from the craniectomy and evacuation  of fluid that was seen on the prior study superficial and deep to the  synthetic device. There is a tiny fluid collection along the right  lateral margin of the craniectomy " measuring 2.2 x 0.5 cm likely a small  seroma or hematoma. The soft tissues otherwise in the craniectomy defect  enhance homogeneously. No other fluid collections are appreciated.  2. T2 high signal in the anterior right frontal lobe appears relatively  stable and most likely represents gliosis.  3. Mild diffuse dural enhancement is likely postoperative.  4. Mild atrophy.  This report was finalized on 04/19/2023 14:56 by Dr. Arian Stauffer MD.    Additional Studies Reviewed: None    Impression:   1.  Prior infection at cranioplasty site-cultures are growing Serratia.  He is on treatment with meropenem.  2.  New onset fever-has shown improvement.  Cultures negative.  Receiving empiric antibiotic treatment with vancomycin and meropenem.  3.  Respiratory failure-O2 requirements improving.  Feel he is responding more to diuretic treatment that antibiotic therapy  4.  Previous episode of PEA    Recommendations:   Continue vancomycin  Continue meropenem  Continue supportive care  No new recommendations from ID standpoint at present    Edwin Jeffers MD

## 2023-04-20 NOTE — PROGRESS NOTES
[facility] [place] - [facility]    Patient: Elijah Escobar    1955    MR# [id number]    Acct# [id number]  04/20/23   08:32 CDT  Referring Provider: Flaco Portillo, *    Chief Complaint: Mechanically ventilated    Interval history: The patient is resting in bed with endotracheal tube to mechanical ventilator.  Currently propofol is paused.  Nursing is at bedside.  O2 sat 100% on 5 PEEP, 0.40 FiO2.  ABGs reviewed.  Dr. Root changed vent to square wave, flow 50, pause 0.2.  Max temp 100.7 at 0000. No other aggravating or alleviating factors.     Meds:  aspirin, 81 mg, Oral, Daily  atorvastatin, 20 mg, Oral, Nightly  budesonide, 0.5 mg, Nebulization, BID - RT  Chlorhexidine Gluconate Cloth, 1 application, Topical, Q24H  digoxin, 250 mcg, Oral, Daily  dilTIAZem, 90 mg, Oral, Q8H  furosemide, 40 mg, Intravenous, BID  insulin detemir, 10 Units, Subcutaneous, Q12H  insulin lispro, 0-14 Units, Subcutaneous, TID AC  ipratropium, 0.5 mg, Nebulization, 4x Daily - RT  levETIRAcetam, 500 mg, Oral, BID  [START ON 4/21/2023] levoFLOXacin, 500 mg, Oral, Q24H  meropenem, 1 g, Intravenous, Q8H  methylnaltrexone, 12 mg, Subcutaneous, Every Other Day  metoclopramide, 10 mg, Oral, 4x Daily AC & at Bedtime  metoprolol tartrate, 50 mg, Oral, Q12H  multivitamin with minerals, 1 tablet, Oral, Daily  pantoprazole, 40 mg, Intravenous, Q AM  polyethylene glycol, 17 g, Oral, Daily  sennosides-docusate, 1 tablet, Oral, Daily  sodium chloride, 10 mL, Intravenous, Q12H  sucralfate, 1 g, Oral, 4x Daily AC & at Bedtime  vancomycin, 1,000 mg, Intravenous, Q12H      norepinephrine, 0.02-0.3 mcg/kg/min, Last Rate: Stopped (04/17/23 2005)  propofol, 5-50 mcg/kg/min, Last Rate: Stopped (04/20/23 0800)  vasopressin, 0.03 Units/min, Last Rate: Stopped (04/16/23 2027)        Ventilator Settings:        Resp Rate (Set): 14     FiO2 (%): 40 %  PEEP/CPAP (cm H2O): 5 cm H20  Minute Ventilation (L/min)  (Obs): 11.5 L/min  Resp Rate (Observed) Vent: 20     I:E Ratio (Obs): 1:2.3  PIP Observed (cm H2O): 32 cm H2O     Physical Exam:  Temp:  [99 °F (37.2 °C)-100.7 °F (38.2 °C)] 99.8 °F (37.7 °C)  Heart Rate:  [] 90  Resp:  [16-26] 20  BP: (117-174)/() 143/76  FiO2 (%):  [40 %-50 %] 40 %    Intake/Output Summary (Last 24 hours) at 4/20/2023 0832  Last data filed at 4/20/2023 0400  Gross per 24 hour   Intake 2344.7 ml   Output 3575 ml   Net -1230.3 ml     SpO2 Percentage    04/20/23 0645 04/20/23 0704 04/20/23 0730   SpO2: 100% 100% 100%   Body mass index is 39.03 kg/m².   Physical Exam   Constitutional:       Appearance: He is obese. He is ill-appearing.  Intubated and sedated  HENT:      Head: Normocephalic. Right scalp wound, well-healed, no redness warmth or drainage     Nose: Nose normal.      Mouth/Throat: ETT/OG/TF  Eyes:      General:         Right eye: No discharge.         Left eye: No discharge.   Cardiovascular:      Rate and Rhythm: tachycardia   Pulmonary:      Effort: Pulmonary effort is normal. No respiratory distress.      Breath sounds: No wheezing.  Diminished in bases  Abdominal:      General: Abdomen is flat.      Palpations: Abdomen is soft.    Musculoskeletal:      Cervical back: Neck supple.      Right lower leg: No edema.      Left lower leg: No edema.   Skin:     General: Skin is warm and dry.      Coloration: Skin is not jaundiced or pale.   Neurological:      General: Intubated and sedated     Electronically signed by ADITYA Foss, 4/20/2023, 08:32 CDT      Physician substantive portion: medical decision making    Result Review    Laboratory Data:  Results from last 7 days   Lab Units 04/19/23  0657 04/18/23  0413 04/17/23  0308   WBC 10*3/mm3 13.61* 16.70* 17.15*   HEMOGLOBIN g/dL 10.2* 10.2* 9.7*   PLATELETS 10*3/mm3 163 203 227     Results from last 7 days   Lab Units 04/19/23  0657 04/18/23  0233 04/17/23  0308 04/16/23  2320   SODIUM mmol/L 138 141 141 140  "  POTASSIUM mmol/L 4.2 4.6 5.3* 5.3*   CO2 mmol/L 24.0 28.0 26.0 25.0   BUN mg/dL 16 15 15 14   CREATININE mg/dL 1.26 1.49* 1.44* 1.38*   CRP mg/dL  --   --   --  1.39*     Results from last 7 days   Lab Units 04/20/23  0417 04/19/23  0352 04/18/23  0359   PH, ARTERIAL pH units 7.478* 7.484* 7.473*   PCO2, ARTERIAL mm Hg 39.4 36.9 41.9   PO2 ART mm Hg 84.1 62.4* 63.2*   FIO2 % 40 30 60     Microbiology Results (last 10 days)     Procedure Component Value - Date/Time    Chlamydia trachomatis, Neisseria gonorrhoeae, PCR - Swab, Penis [[id number]]  (Normal) Collected: 04/19/23 0936    Lab Status: Final result Specimen: Swab from Penis Updated: 04/19/23 1425     Chlamydia DNA by PCR Not Detected     Neisseria gonorrhoeae by PCR Not Detected    Narrative:      Disclaimer: The Aptima Combo 2 assay is a target amplification nucleic acid probe test that utilizes target capture for the in vitro qualitative detection and differentiation of ribosomal RNA from Chlamydia trachomatis and Neisseria gonorrhoeae to aid in the diagnosis of chlamydial and/or gonococcal urogenital disease.  Cell culture was once considered to be the \"gold standard\" for detection of CT and NG.  Culture is quite specific, but scientific studies have demonstrated that the NAAT DNA probe technologies have higher clinical sensitivities than culture.    Genital Culture - Swab, Penis [[id number]] Collected: 04/19/23 0936    Lab Status: Preliminary result Specimen: Swab from Penis Updated: 04/20/23 0854     Genital Culture No growth     Gram Stain Few (2+) WBCs seen      No organisms seen    Blood Culture - Blood, Hand, Right [[id number]]  (Normal) Collected: 04/19/23 0209    Lab Status: Preliminary result Specimen: Blood from Hand, Right Updated: 04/20/23 0230     Blood Culture No growth at 24 hours    Blood Culture - Blood, Hand, Left [[id number]]  (Normal) Collected: 04/19/23 0207    Lab Status: Preliminary result Specimen: Blood from Hand, Left Updated: " 04/20/23 0230     Blood Culture No growth at 24 hours         Recent films:  MRI Brain With & Without Contrast    Result Date: 4/19/2023  EXAMINATION:  MRI BRAIN WITHOUT AND WITH CONTRAST-  4/19/2023 1:38 PM CDT  HISTORY: Neuro decline. Soft tissue swelling in the right frontal and right periorbital region. Elevated white blood cell count. S06.4X0A-Epidural hemorrhage without loss of consciousness, initial encounter; Z74.09-Other reduced mobility; Z98.890-Other specified postprocedural states; T81.42XA-Infection following a procedure, deep incisional surgical site, initial encounter; D32.9-Benign neoplasm of meninges, unspecified.  TECHNIQUE: Multiplanar imaging was performed in a high field magnet before and after IV gadolinium contrast administration.  COMPARISON: MRI 03/31/2023. Brain CT on 04/17/2023.  FINDINGS: There has been prior right frontal craniectomy. Since previous MRI, there has been removal of a synthetic covering in the right frontal region. The soft tissues in this area enhances diffusely. There is a tiny collection of fluid on the right lateral to the lower margin of the craniectomy and likely in the subgaleal space measuring about 2.2 x 0.5 cm. This is likely a small postoperative seroma or hematoma. There is T2 high signal in the right frontal lobe anteriorly that appears relatively stable compared to the prior exam likely representing postoperative gliosis. No other definite fluid collections are identified. There is mild diffuse dural enhancement that is likely postoperative. The ventricular system is nondilated. There is a mild degree of atrophy. No other significant areas of T2 signal abnormality are appreciated.       Impression: 1. Prior right frontal craniectomy. Since the previous MRI, there has been removal of a synthetic device from the craniectomy and evacuation of fluid that was seen on the prior study superficial and deep to the synthetic device. There is a tiny fluid collection  along the right lateral margin of the craniectomy measuring 2.2 x 0.5 cm likely a small seroma or hematoma. The soft tissues otherwise in the craniectomy defect enhance homogeneously. No other fluid collections are appreciated. 2. T2 high signal in the anterior right frontal lobe appears relatively stable and most likely represents gliosis. 3. Mild diffuse dural enhancement is likely postoperative. 4. Mild atrophy.  This report was finalized on 04/19/2023 14:56 by Dr. Arian Stauffer MD.    XR Chest 1 View    Result Date: 4/20/2023  EXAMINATION:  XR CHEST 1 VW-  4/20/2023 3:05 AM CDT  HISTORY: Intubated and sedated. S06.4X0A-Epidural hemorrhage without loss of consciousness, initial encounter; Z74.09-Other reduced mobility; Z98.890-Other specified postprocedural states; T81.42XA-Infection following a procedure, deep incisional surgical site, initial encounter; D32.9-Benign neoplasm of meninges, unspecified; E11.65-Type 2 diabetes mellitus with hyperglycemia.  COMPARISON: 04/19/2023.  TECHNIQUE: Single view AP image.  FINDINGS:  No tube or line changes. There are infiltrates in the mid and lower lung zones. There is mild improvement on the left. There is blunting of the costophrenic angles. There is borderline cardiomegaly.       Impression: 1. Infiltrates in the mid and lower lung zones with some improvement on the left. 2. Blunting of the costophrenic angles.   This report was finalized on 04/20/2023 07:29 by Dr. Arian Stauffer MD.    XR Chest 1 View    Result Date: 4/19/2023  EXAM/TECHNIQUE: XR CHEST 1 VW-  INDICATION: Intubated and sedated patient, epidural hemorrhage  COMPARISON: Prior day  FINDINGS:  Endotracheal tube and RIGHT IJ CVL are stable in position. Enteric tube terminates below the diaphragm out of the field of view.  Cardiac silhouette is prominent but stable. No change in layering bilateral pleural effusions and diffuse interstitial and airspace opacity. No evidence of pneumothorax.      Impression:   No change in appearance of the chest. This report was finalized on 04/19/2023 06:52 by Dr. Mk Chinchilla MD.     Personal review of imaging: cxr shows bilat infiltrates, ett good position, worse than 4/17 but better than yesterday    Pulmonary Assessment:  1. Acute resp failure with hypoxia, requiring mechanical vent, threat to life and bodily function.  high risk of morbidity from additional diagnostic testing or treatment   2. Pulmonary edema likely acute diastolic CHF  3. Pneumonia improved  4. Flow dyssynchrony    Recommend/plan:   · Continue empiric abx  · Continue diuresis  · Follow up abg  · Follow up cxr  · Follow up chemistries  · Vent waveform changed to square to improve synchrony, with 0.2 sec pause to prevent double triggering    This visit was performed by both a physician and an [facility] RN.  I personally evaluated and examined the patient.  I performed all aspects of the medical decision making as documented.  Electronically signed by Ajay Root MD, 4/20/2023, 10:08 CDT

## 2023-04-20 NOTE — PROGRESS NOTES
[facility] Medicine Services  INPATIENT PROGRESS NOTE    Patient Name: Elijah Escobar  Date of Admission: 3/31/2023  Today's Date: 04/20/23  Length of Stay: 20  Primary Care Physician: Brianna Martinez APRN    Subjective   Chief Complaint:   Sedated on the ventilator.    HPI   Blood sugars elevated   Tube feed at 45 cc/h  At goal.          Review of Systems   All pertinent negatives and positives are as above. All other systems have been reviewed and are negative unless otherwise stated.     Objective    Temp:  [99 °F (37.2 °C)-100.7 °F (38.2 °C)] 99.8 °F (37.7 °C)  Heart Rate:  [] 112  Resp:  [16-26] 25  BP: (117-167)/() 164/79  FiO2 (%):  [40 %-50 %] 40 %  Physical Exam  Vitals and nursing note reviewed.   Constitutional:       Comments: sedated   HENT:      Head: Normocephalic.      Right Ear: External ear normal.      Left Ear: External ear normal.   Cardiovascular:      Pulses: Normal pulses.      Heart sounds: Normal heart sounds.   Pulmonary:      Comments: Mechanical ventilation  Abdominal:      General: Bowel sounds are normal.      Palpations: Abdomen is soft.   Musculoskeletal:      Cervical back: No rigidity.   Skin:     General: Skin is warm and dry.   Neurological:      Comments: sedated             Results Review:  I have reviewed the labs, radiology results, and diagnostic studies.    Laboratory Data:   Results from last 7 days   Lab Units 04/19/23  0657 04/18/23  0413 04/17/23  0308   WBC 10*3/mm3 13.61* 16.70* 17.15*   HEMOGLOBIN g/dL 10.2* 10.2* 9.7*   HEMATOCRIT % 33.8* 33.4* 31.1*   PLATELETS 10*3/mm3 163 203 227        Results from last 7 days   Lab Units 04/19/23  0657 04/18/23  0233 04/17/23  0308   SODIUM mmol/L 138 141 141   POTASSIUM mmol/L 4.2 4.6 5.3*   CHLORIDE mmol/L 101 103 103   CO2 mmol/L 24.0 28.0 26.0   BUN mg/dL 16 15 15   CREATININE mg/dL 1.26 1.49* 1.44*   CALCIUM mg/dL 7.7* 7.9* 8.0*   BILIRUBIN mg/dL 0.5 0.3 0.3   ALK PHOS  U/L 89 92 102   ALT (SGPT) U/L 60* 106* 159*   AST (SGOT) U/L 17 38 170*   GLUCOSE mg/dL 139* 70 165*       Culture Data:   Blood Culture   Date Value Ref Range Status   04/19/2023 No growth at 24 hours  Preliminary   04/19/2023 No growth at 24 hours  Preliminary       Radiology Data:   Imaging Results (Last 24 Hours)     Procedure Component Value Units Date/Time    XR Chest 1 View [[id number]] Collected: 04/20/23 0728     Updated: 04/20/23 0732    Narrative:      EXAMINATION:  XR CHEST 1 VW-  4/20/2023 3:05 AM CDT     HISTORY: Intubated and sedated. S06.4X0A-Epidural hemorrhage without  loss of consciousness, initial encounter; Z74.09-Other reduced mobility;  Z98.890-Other specified postprocedural states; T81.42XA-Infection  following a procedure, deep incisional surgical site, initial encounter;  D32.9-Benign neoplasm of meninges, unspecified; E11.65-Type 2 diabetes  mellitus with hyperglycemia.     COMPARISON: 04/19/2023.     TECHNIQUE: Single view AP image.     FINDINGS:  No tube or line changes. There are infiltrates in the mid and  lower lung zones. There is mild improvement on the left. There is  blunting of the costophrenic angles. There is borderline cardiomegaly.          Impression:      1. Infiltrates in the mid and lower lung zones with some improvement on  the left.  2. Blunting of the costophrenic angles.        This report was finalized on 04/20/2023 07:29 by Dr. Arian Stauffer MD.    MRI Brain With & Without Contrast [[id number]] Collected: 04/19/23 1435     Updated: 04/19/23 1459    Narrative:      EXAMINATION:  MRI BRAIN WITHOUT AND WITH CONTRAST-  4/19/2023 1:38 PM  CDT     HISTORY: Neuro decline. Soft tissue swelling in the right frontal and  right periorbital region. Elevated white blood cell count.  S06.4X0A-Epidural hemorrhage without loss of consciousness, initial  encounter; Z74.09-Other reduced mobility; Z98.890-Other specified  postprocedural states; T81.42XA-Infection following a  procedure, deep  incisional surgical site, initial encounter; D32.9-Benign neoplasm of  meninges, unspecified.     TECHNIQUE: Multiplanar imaging was performed in a high field magnet  before and after IV gadolinium contrast administration.     COMPARISON: MRI 03/31/2023. Brain CT on 04/17/2023.     FINDINGS: There has been prior right frontal craniectomy. Since previous  MRI, there has been removal of a synthetic covering in the right frontal  region. The soft tissues in this area enhances diffusely. There is a  tiny collection of fluid on the right lateral to the lower margin of the  craniectomy and likely in the subgaleal space measuring about 2.2 x 0.5  cm. This is likely a small postoperative seroma or hematoma. There is T2  high signal in the right frontal lobe anteriorly that appears relatively  stable compared to the prior exam likely representing postoperative  gliosis. No other definite fluid collections are identified. There is  mild diffuse dural enhancement that is likely postoperative. The  ventricular system is nondilated. There is a mild degree of atrophy. No  other significant areas of T2 signal abnormality are appreciated.          Impression:      1. Prior right frontal craniectomy. Since the previous MRI, there has  been removal of a synthetic device from the craniectomy and evacuation  of fluid that was seen on the prior study superficial and deep to the  synthetic device. There is a tiny fluid collection along the right  lateral margin of the craniectomy measuring 2.2 x 0.5 cm likely a small  seroma or hematoma. The soft tissues otherwise in the craniectomy defect  enhance homogeneously. No other fluid collections are appreciated.  2. T2 high signal in the anterior right frontal lobe appears relatively  stable and most likely represents gliosis.  3. Mild diffuse dural enhancement is likely postoperative.  4. Mild atrophy.     This report was finalized on 04/19/2023 14:56 by Dr. Arian Stauffer,  MD.          I have reviewed the patient's current medications.     Assessment/Plan   Assessment  Active Hospital Problems    Diagnosis    • **Swelling of scalp    • Elevated liver function tests    • COPD (chronic obstructive pulmonary disease)    • Obesity (BMI 30-39.9)    • Tobacco abuse, in remission    • Acute systolic heart failure    • ADDIE (obstructive sleep apnea)    • Type 2 myocardial infarction due to arrhythmia    • Acute pulmonary edema due to A-fib RVR    • Acute respiratory failure with hypoxia    • Intracranial epidural hematoma    • Post-operative infection    • Paroxysmal atrial fibrillation with rapid ventricular response    • Type 2 diabetes mellitus with hyperglycemia and peripheral neuropathy, with long-term current use of insulin (HCC)    • Coronary artery disease    • Meningioma s/p craniotomy        Treatment Plan  Increase levimir to 15 units q12h    Medical Decision Making  Number and Complexity of problems:   1) exacerbation of respiratory failure with hypoxia secondary to pulmonary edema, acute, high complexity  2) PAF with RVR, acute, moderate complexity  3) meningioma versus intracranial infection, acute, high complexity  4) type 2 diabetes, chronic, moderate complexity  5) elevated liver enzymes acute moderate complexity  6) elevated WBC acute moderate complexity  Differential Diagnosis: Pulmonary embolus, LV dysfunction     Conditions and Status        Condition is unchanged.     [facility] Data  External documents reviewed: Care everywhere documentation  Cardiac tracing (EKG, telemetry) interpretation: See HPI  Radiology interpretation: See HPI  Labs reviewed: See HPI  Any tests that were considered but not ordered: None     Decision rules/scores evaluated (example [id number], Wells, etc): [id number] score of 5 with a 7.2% risk of stroke annually     Discussed with:  nursing      Care Planning  Shared decision making: Patient is sedated  Code status and  discussions: full     Disposition  Social Determinants of Health that impact treatment or disposition: none  I expect the patient to be discharged by the primary service.   Electronically signed by Susanne Milligan, 04/20/23, 11:12 CDT.

## 2023-04-20 NOTE — PROGRESS NOTES
Neurosurgery Daily Progress Note    HPI:  Elijah Escobar is a 67 y.o. male with a significant medical history of hypertension, diabetes, hyperlipidemia, seizures, craniotomy for tumor (6/16/2022), craniotomy for washout (7/1/2022), craniectomy (10/4/2022), coronary artery disease, diabetes, erectile dysfunction, GERD, hypertension, hyperlipidemia, tobacco abuse, and obesity.  He presents today with a complaint of a 4-5 days onset of progressively worsening right frontal, temporal, and periorbital edema and associated symptoms to include, but not limited to generalized fatigue, chills, dyspnea, intermittent nausea without vomiting, and states he's felt feverish.  Physical exam findings of neurologically intact with right frontal, temporal, and periorbital swelling.  WBC elevated at 15.  3 to an CRP elevated at 14.75.  Imaging pending.    Assessment:   Past Medical History:   Diagnosis Date   • Brain tumor    • Coronary artery disease    • COVID-19 vaccine series completed     MODERNA X 3; LAST DOSE 3/2022   • Diabetes    • Erectile dysfunction    • GERD (gastroesophageal reflux disease)    • Hypercholesteremia    • Hypertension    • Seizure      Active Hospital Problems    Diagnosis    • **Swelling of scalp    • Elevated liver function tests    • COPD (chronic obstructive pulmonary disease)    • Obesity (BMI 30-39.9)    • Tobacco abuse, in remission    • Acute systolic heart failure    • ADDIE (obstructive sleep apnea)    • Type 2 myocardial infarction due to arrhythmia    • Acute pulmonary edema due to A-fib RVR    • Acute respiratory failure with hypoxia    • Intracranial epidural hematoma    • Post-operative infection    • Paroxysmal atrial fibrillation with rapid ventricular response    • Type 2 diabetes mellitus with hyperglycemia and peripheral neuropathy, with long-term current use of insulin (HCC)    • Coronary artery disease    • Meningioma s/p craniotomy      Plan:   Neuro:   With sedation paused, gag and  cough intact, pupillary response intact, does not follow commands, no motor response to painful stimuli.    Infected cranial flap      POD #15 (4/4/2023)  craniectomy and washout    Postop CT stable. No acute abnormalities.    Flap tapped I&D cultures 4+ gram-negative bacilli    Heavy 4+ Serratia marcescens     8 Days Post-Op (4/13/2023) evacuation of scalp hematoma    JUAN removed     Continue neuro exams per policy.  Call for decline  CT head reviewed.  Stable.  No acute abnormalities.  MRI brain reviewed    CV: Code for PEA yesterday afternoon, 4/16/2023.  Required epinephrine and bicarb   AFib/flutter, rate controlled.   Hold all anticoagulation for 2 weeks.   No evidence of DVT or PE per imaging   Appreciate cardiology  Pulm: Remains intubated on low-dose propofol.     Atelectasis versus pneumonia left lung      :  Voiding per Jeong catheter.  Trace leuks.  TNTC WBC  FEN: Currently NPO  Endocrine: Glucose improving.  Continue SSI  GI: SHERIF.  +BM  ID:  Infected cranial flap  CSF: no growth to date   PNA.  Resolved   UTI.  Resolved   Continue antibiotics.  Currently on Levaquin and vancomycin  Heme:  DVT prophylaxis with SCDs.  Hold all anticoagulation for 2 weeks.  Pain: No complaints at present  Dispo: Pending extubation     Chief complaint:   4-5 days onset of progressively worsening right frontal, temporal, and periorbital edema and associated symptoms to include, but not limited to generalized fatigue, chills, dyspnea, intermittent nausea without vomiting, and states he's felt feverish.    HPI  Subjective  Confusion and slurred speech    Temp:  [99 °F (37.2 °C)-100.7 °F (38.2 °C)] 99.8 °F (37.7 °C)  Heart Rate:  [] 90  Resp:  [16-26] 20  BP: (117-174)/() 143/76  FiO2 (%):  [40 %-50 %] 40 %    Output by Drain (mL) 04/19/23 0701 - 04/19/23 1900 04/19/23 1901 - 04/20/23 0700 04/20/23 0701 - 04/20/23 0847 Range Total   Requested LDAs do not have output data documented.     Objective:  Vital signs:  "(most recent): Blood pressure 143/76, pulse 90, temperature 99.8 °F (37.7 °C), temperature source Axillary, resp. rate 20, height 177.8 cm (70\"), weight 123 kg (272 lb), SpO2 100 %.      Neurologic Exam     Mental Status      With sedation paused gag and cough intact, pupillary response intact, does not follow commands, no motor response to painful stimuli.         Cranial Nerves     CN III, IV, VI   Pupils are equal, round, and reactive to light.    CN IX, X   Right gag reflex: normal  Left gag reflex: normal    Motor Exam   No response to painful stimuli.     Drains: * No LDAs found *    Imaging Results (Last 24 Hours)     Procedure Component Value Units Date/Time    XR Chest 1 View [[id number]] Collected: 04/20/23 0728     Updated: 04/20/23 0732    Narrative:      EXAMINATION:  XR CHEST 1 VW-  4/20/2023 3:05 AM CDT     HISTORY: Intubated and sedated. S06.4X0A-Epidural hemorrhage without  loss of consciousness, initial encounter; Z74.09-Other reduced mobility;  Z98.890-Other specified postprocedural states; T81.42XA-Infection  following a procedure, deep incisional surgical site, initial encounter;  D32.9-Benign neoplasm of meninges, unspecified; E11.65-Type 2 diabetes  mellitus with hyperglycemia.     COMPARISON: 04/19/2023.     TECHNIQUE: Single view AP image.     FINDINGS:  No tube or line changes. There are infiltrates in the mid and  lower lung zones. There is mild improvement on the left. There is  blunting of the costophrenic angles. There is borderline cardiomegaly.          Impression:      1. Infiltrates in the mid and lower lung zones with some improvement on  the left.  2. Blunting of the costophrenic angles.        This report was finalized on 04/20/2023 07:29 by Dr. Arian Stauffer MD.    MRI Brain With & Without Contrast [[id number]] Collected: 04/19/23 1435     Updated: 04/19/23 1459    Narrative:      EXAMINATION:  MRI BRAIN WITHOUT AND WITH CONTRAST-  4/19/2023 1:38 PM  CDT     HISTORY: Neuro " decline. Soft tissue swelling in the right frontal and  right periorbital region. Elevated white blood cell count.  S06.4X0A-Epidural hemorrhage without loss of consciousness, initial  encounter; Z74.09-Other reduced mobility; Z98.890-Other specified  postprocedural states; T81.42XA-Infection following a procedure, deep  incisional surgical site, initial encounter; D32.9-Benign neoplasm of  meninges, unspecified.     TECHNIQUE: Multiplanar imaging was performed in a high field magnet  before and after IV gadolinium contrast administration.     COMPARISON: MRI 03/31/2023. Brain CT on 04/17/2023.     FINDINGS: There has been prior right frontal craniectomy. Since previous  MRI, there has been removal of a synthetic covering in the right frontal  region. The soft tissues in this area enhances diffusely. There is a  tiny collection of fluid on the right lateral to the lower margin of the  craniectomy and likely in the subgaleal space measuring about 2.2 x 0.5  cm. This is likely a small postoperative seroma or hematoma. There is T2  high signal in the right frontal lobe anteriorly that appears relatively  stable compared to the prior exam likely representing postoperative  gliosis. No other definite fluid collections are identified. There is  mild diffuse dural enhancement that is likely postoperative. The  ventricular system is nondilated. There is a mild degree of atrophy. No  other significant areas of T2 signal abnormality are appreciated.          Impression:      1. Prior right frontal craniectomy. Since the previous MRI, there has  been removal of a synthetic device from the craniectomy and evacuation  of fluid that was seen on the prior study superficial and deep to the  synthetic device. There is a tiny fluid collection along the right  lateral margin of the craniectomy measuring 2.2 x 0.5 cm likely a small  seroma or hematoma. The soft tissues otherwise in the craniectomy defect  enhance homogeneously. No  other fluid collections are appreciated.  2. T2 high signal in the anterior right frontal lobe appears relatively  stable and most likely represents gliosis.  3. Mild diffuse dural enhancement is likely postoperative.  4. Mild atrophy.     This report was finalized on 04/19/2023 14:56 by Dr. Arian Stauffer MD.        Lab Results (last 24 hours)     Procedure Component Value Units Date/Time    POC Glucose Once [[id number]]  (Abnormal) Collected: 04/20/23 0746    Specimen: Blood Updated: 04/20/23 0757     Glucose 282 mg/dL      Comment: : [id number] Uvaldo Grissom ID: [id number]       POC Glucose Once [[id number]]  (Abnormal) Collected: 04/20/23 0540    Specimen: Blood Updated: 04/20/23 0551     Glucose 275 mg/dL      Comment: : [id number] [facility] NicolasMeter ID: [id number]       Blood Gas, Arterial - [[id number]]  (Abnormal) Collected: 04/20/23 0417    Specimen: Arterial Blood Updated: 04/20/23 0416     Site Left Radial     Marek's Test Positive     pH, Arterial 7.478 pH units      Comment: 83 Value above reference range        pCO2, Arterial 39.4 mm Hg      pO2, Arterial 84.1 mm Hg      HCO3, Arterial 29.2 mmol/L      Comment: 83 Value above reference range        Base Excess, Arterial 5.2 mmol/L      Comment: 83 Value above reference range        O2 Saturation, Arterial 97.1 %      Temperature 37.0 C      Barometric Pressure for Blood Gas 749 mmHg      Modality Ventilator     FIO2 40 %      Ventilator Mode AC     Set Tidal Volume 500     Set Mech Resp Rate 14.0     PEEP 5.0     Collected by [id number]     Comment: Meter: [id number]     :  [id number]        pCO2, Temperature Corrected 39.4 mm Hg      pH, Temp Corrected 7.478 pH Units      pO2, Temperature Corrected 84.1 mm Hg     Blood Culture - Blood, Hand, Right [[id number]]  (Normal) Collected: 04/19/23 0209    Specimen: Blood from Hand, Right Updated: 04/20/23 0230     Blood Culture No growth at 24 hours    Blood Culture - Blood, Hand, Left  "[[id number]]  (Normal) Collected: 04/19/23 0207    Specimen: Blood from Hand, Left Updated: 04/20/23 0230     Blood Culture No growth at 24 hours    POC Glucose Once [[id number]]  (Abnormal) Collected: 04/20/23 0055    Specimen: Blood Updated: 04/20/23 0106     Glucose 258 mg/dL      Comment: : [id number] [facility] NicolasMeter ID: [id number]       POC Glucose Once [[id number]]  (Abnormal) Collected: 04/19/23 2054    Specimen: Blood Updated: 04/19/23 2108     Glucose 187 mg/dL      Comment: : [id number] [facility] NicolasMeter ID: [id number]       POC Glucose Once [[id number]]  (Abnormal) Collected: 04/19/23 1655    Specimen: Blood Updated: 04/19/23 1706     Glucose 159 mg/dL      Comment: : [id number] Damon Rodrigues ID: [id number]       Chlamydia trachomatis, Neisseria gonorrhoeae, PCR - Swab, Penis [[id number]]  (Normal) Collected: 04/19/23 0936    Specimen: Swab from Penis Updated: 04/19/23 1425     Chlamydia DNA by PCR Not Detected     Neisseria gonorrhoeae by PCR Not Detected    Narrative:      Disclaimer: The Aptima Combo 2 assay is a target amplification nucleic acid probe test that utilizes target capture for the in vitro qualitative detection and differentiation of ribosomal RNA from Chlamydia trachomatis and Neisseria gonorrhoeae to aid in the diagnosis of chlamydial and/or gonococcal urogenital disease.  Cell culture was once considered to be the \"gold standard\" for detection of CT and NG.  Culture is quite specific, but scientific studies have demonstrated that the NAAT DNA probe technologies have higher clinical sensitivities than culture.    POC Glucose Once [[id number]]  (Abnormal) Collected: 04/19/23 1243    Specimen: Blood Updated: 04/19/23 1253     Glucose 240 mg/dL      Comment: : [id number] Damon Mchughhanie AMeter ID: [id number]       Genital Culture - Swab, Penis [[id number]] Collected: 04/19/23 0936    Specimen: Swab from Penis Updated: 04/19/23 1234     Gram Stain Few (2+) WBCs seen     "  No organisms seen    T. Pallidum (Syphilis) Screening Cascade [[id number]] Collected: 04/19/23 1001    Specimen: Blood Updated: 04/19/23 1014        ADITYA Cespedes

## 2023-04-20 NOTE — PLAN OF CARE
Goal Outcome Evaluation:      Meeting currently set goals..  No indicatioin for additional fntervention at this time.

## 2023-04-21 NOTE — PLAN OF CARE
Goal Outcome Evaluation:  Plan of Care Reviewed With: patient        Progress: no change  Outcome Evaluation: EEG performed today. PRN Morphine & home dose of Neurontin ordered. Morphine given x 1 with improvement in vent dyssynchrony. Tube feeding formula changed as per order. Sedation remains off.

## 2023-04-21 NOTE — CASE MANAGEMENT/SOCIAL WORK
Continued Stay Note   [place]     Patient Name: Elijah Escobar  MRN: [id number]  Today's Date: 4/21/2023    Admit Date: 3/31/2023    Plan: LTAC?   Discharge Plan     Row Name 04/21/23 0929       Plan    Plan LTAC?    Plan Comments Patient continues on vent.  Would patient benefit from LTAC placement?               Discharge Codes    No documentation.                     TIANA Syed

## 2023-04-21 NOTE — PROGRESS NOTES
Neurosurgery Daily Progress Note    HPI:  Elijah Escobar is a 67 y.o. male with a significant medical history of hypertension, diabetes, hyperlipidemia, seizures, craniotomy for tumor (6/16/2022), craniotomy for washout (7/1/2022), craniectomy (10/4/2022), coronary artery disease, diabetes, erectile dysfunction, GERD, hypertension, hyperlipidemia, tobacco abuse, and obesity.  He presents today with a complaint of a 4-5 days onset of progressively worsening right frontal, temporal, and periorbital edema and associated symptoms to include, but not limited to generalized fatigue, chills, dyspnea, intermittent nausea without vomiting, and states he's felt feverish.  Physical exam findings of neurologically intact with right frontal, temporal, and periorbital swelling.  WBC elevated at 15.  3 to an CRP elevated at 14.75.  Imaging pending.    Assessment:   Past Medical History:   Diagnosis Date   • Brain tumor    • Coronary artery disease    • COVID-19 vaccine series completed     MODERNA X 3; LAST DOSE 3/2022   • Diabetes    • Erectile dysfunction    • GERD (gastroesophageal reflux disease)    • Hypercholesteremia    • Hypertension    • Seizure      Active Hospital Problems    Diagnosis    • **Swelling of scalp    • Elevated liver function tests    • COPD (chronic obstructive pulmonary disease)    • Obesity (BMI 30-39.9)    • Tobacco abuse, in remission    • Acute systolic heart failure    • ADDIE (obstructive sleep apnea)    • Type 2 myocardial infarction due to arrhythmia    • Acute pulmonary edema due to A-fib RVR    • Acute respiratory failure with hypoxia    • Intracranial epidural hematoma    • Post-operative infection    • Paroxysmal atrial fibrillation with rapid ventricular response    • Type 2 diabetes mellitus with hyperglycemia and peripheral neuropathy, with long-term current use of insulin (HCC)    • Coronary artery disease    • Meningioma s/p craniotomy      Plan:   Neuro:   With sedation paused, gag and  cough intact, pupillary response intact, does not follow commands, withdraws with lower extremities to painful stimuli.    Infected cranial flap      POD #16 (4/4/2023)  craniectomy and washout    Postop CT stable. No acute abnormalities.    Flap tapped I&D cultures 4+ gram-negative bacilli    Heavy 4+ Serratia marcescens     9 Days Post-Op (4/13/2023) evacuation of scalp hematoma    JUAN removed     Continue neuro exams per policy.  Call for decline  CT head reviewed.  Stable.  No acute abnormalities.  MRI brain reviewed  EEG shows no epileptiform activity or ongoing seizures    CV: Code for PEA yesterday afternoon, 4/16/2023.  Required epinephrine and bicarb   AFib/flutter, rate controlled.   Hold all anticoagulation for 2 weeks.   No evidence of DVT or PE per imaging   Appreciate cardiology  Pulm: Remains intubated on low-dose propofol.     Atelectasis versus pneumonia left lung      :  Voiding per Jeong catheter.  Trace leuks.  TNTC WBC  FEN: Currently NPO  Endocrine: Glucose improving.  Continue SSI  GI: SHERIF.  +BM  ID:  Infected cranial flap  CSF: no growth to date   PNA.  Resolved   UTI.  Resolved   Continue antibiotics.  Currently on Levaquin and vancomycin  Heme:  DVT prophylaxis with SCDs.  Hold all anticoagulation for 2 weeks.  Pain: No complaints at present  Dispo: Pending extubation     Chief complaint:   4-5 days onset of progressively worsening right frontal, temporal, and periorbital edema and associated symptoms to include, but not limited to generalized fatigue, chills, dyspnea, intermittent nausea without vomiting, and states he's felt feverish.    HPI  Subjective  Confusion and slurred speech    Temp:  [99.3 °F (37.4 °C)-100.1 °F (37.8 °C)] 100.1 °F (37.8 °C)  Heart Rate:  [] 120  Resp:  [14-31] 17  BP: ()/() 127/76  FiO2 (%):  [40 %-70 %] 70 %    Output by Drain (mL) 04/20/23 0701 - 04/20/23 1900 04/20/23 1901 - 04/21/23 0700 04/21/23 0701 - 04/21/23 1448 Range Total   Requested  "LDAs do not have output data documented.     Objective:  Vital signs: (most recent): Blood pressure 127/76, pulse 120, temperature 100.1 °F (37.8 °C), temperature source Axillary, resp. rate 17, height 177.8 cm (70\"), weight 126 kg (278 lb 12.8 oz), SpO2 100 %.      Neurologic Exam     Mental Status      With sedation paused gag and cough intact, pupillary response intact, does not follow commands.       Cranial Nerves     CN III, IV, VI   Pupils are equal, round, and reactive to light.    CN IX, X   Right gag reflex: normal  Left gag reflex: normal    Motor Exam   Withdraws to painful stimuli to lower extremities     Drains: * No LDAs found *    Imaging Results (Last 24 Hours)     Procedure Component Value Units Date/Time    XR Chest 1 View [[id number]] Collected: 04/21/23 0655     Updated: 04/21/23 0659    Narrative:      EXAM/TECHNIQUE: XR CHEST 1 VW-     INDICATION: Intubated and sedated patient     COMPARISON: 4/20/2023     FINDINGS:     Endotracheal tube is 4.4 cm above the robert. Enteric tube is in the mid  stomach. RIGHT IJ CVL tip overlies the cavoatrial junction.     Cardiac silhouette is mildly enlarged but stable. No large pleural  effusion or visible pneumothorax. Similar-appearing central vascular  congestion with perihilar airspace opacities and diffuse interstitial  opacity, suspected to represent pulmonary edema.       Impression:         No change in appearance of the chest.  This report was finalized on 04/21/2023 06:56 by Dr. Mk Chinchilla MD.        Lab Results (last 24 hours)     Procedure Component Value Units Date/Time    Blood Gas, Arterial - [[id number]]  (Abnormal) Collected: 04/21/23 1412    Specimen: Arterial Blood Updated: 04/21/23 1411     Site Right Brachial     Marek's Test N/A     pH, Arterial 7.492 pH units      Comment: 83 Value above reference range        pCO2, Arterial 41.2 mm Hg      pO2, Arterial 223.0 mm Hg      Comment: 83 Value above reference range        HCO3, Arterial " 31.5 mmol/L      Comment: 83 Value above reference range        Base Excess, Arterial 7.5 mmol/L      Comment: 83 Value above reference range        O2 Saturation, Arterial 100.0 %      Comment: 83 Value above reference range        Temperature 37.0 C      Barometric Pressure for Blood Gas 749 mmHg      Modality Ventilator     FIO2 70 %      Ventilator Mode PC     Set [facility] Resp Rate 14.0     PEEP 8.0     Collected by [id number]     Comment: Meter: [id number]     :  [id number]        pCO2, Temperature Corrected 41.2 mm Hg      pH, Temp Corrected 7.492 pH Units      pO2, Temperature Corrected 223 mm Hg     POC Glucose Once [[id number]]  (Abnormal) Collected: 04/21/23 1202    Specimen: Blood Updated: 04/21/23 1213     Glucose 288 mg/dL      Comment: : [id number] Phoenix Paola JMeter ID: [id number]       Genital Culture - Swab, Penis [[id number]] Collected: 04/19/23 0936    Specimen: Swab from Penis Updated: 04/21/23 0958     Genital Culture No growth at 2 days     Gram Stain Few (2+) WBCs seen      No organisms seen    POC Glucose Once [[id number]]  (Abnormal) Collected: 04/21/23 0751    Specimen: Blood Updated: 04/21/23 0812     Glucose 347 mg/dL      Comment: : [id number] Garibay Paola JMeter ID: [id number]       POC Glucose Once [[id number]]  (Abnormal) Collected: 04/21/23 0557    Specimen: Blood Updated: 04/21/23 0608     Glucose 354 mg/dL      Comment: : [id number] Juan José oMeistiMeter ID: [id number]       Blood Gas, Arterial - [[id number]]  (Abnormal) Collected: 04/21/23 0407    Specimen: Arterial Blood Updated: 04/21/23 0406     Site Right Radial     Marek's Test N/A     pH, Arterial 7.421 pH units      pCO2, Arterial 42.9 mm Hg      pO2, Arterial 68.1 mm Hg      Comment: 84 Value below reference range        HCO3, Arterial 27.9 mmol/L      Comment: 83 Value above reference range        Base Excess, Arterial 3.0 mmol/L      Comment: 83 Value above reference range        O2 Saturation, Arterial  92.5 %      Comment: 84 Value below reference range        Temperature 37.0 C      Barometric Pressure for Blood Gas 749 mmHg      Modality Ventilator     FIO2 60 %      Ventilator Mode AC     Set Tidal Volume 500     Set Mech Resp Rate 14.0     PEEP 5.0     Collected by [id number]     Comment: Meter: [id number]     :  [id number]        pCO2, Temperature Corrected 42.9 mm Hg      pH, Temp Corrected 7.421 pH Units      pO2, Temperature Corrected 68.1 mm Hg     Comprehensive Metabolic Panel [[id number]]  (Abnormal) Collected: 04/21/23 0223    Specimen: Blood Updated: 04/21/23 0311     Glucose 265 mg/dL      BUN 21 mg/dL      Creatinine 1.04 mg/dL      Sodium 138 mmol/L      Potassium 3.7 mmol/L      Chloride 100 mmol/L      CO2 28.0 mmol/L      Calcium 7.9 mg/dL      Total Protein 5.9 g/dL      Albumin 2.7 g/dL      ALT (SGPT) 32 U/L      AST (SGOT) 25 U/L      Alkaline Phosphatase 93 U/L      Total Bilirubin 0.5 mg/dL      Globulin 3.2 gm/dL      A/G Ratio 0.8 g/dL      BUN/Creatinine Ratio 20.2     Anion Gap 10.0 mmol/L      eGFR 78.7 mL/min/1.73     Narrative:      GFR Normal >60  Chronic Kidney Disease <60  Kidney Failure <15      BNP [[id number]]  (Abnormal) Collected: 04/21/23 0223    Specimen: Blood Updated: 04/21/23 0310     proBNP 14,714.0 pg/mL     Narrative:      Among patients with dyspnea, NT-proBNP is highly sensitive for the detection of acute congestive heart failure. In addition NT-proBNP of <300 pg/ml effectively rules out acute congestive heart failure with 99% negative predictive value.      Blood Culture - Blood, Hand, Right [[id number]]  (Normal) Collected: 04/19/23 0209    Specimen: Blood from Hand, Right Updated: 04/21/23 0230     Blood Culture No growth at 2 days    Blood Culture - Blood, Hand, Left [[id number]]  (Normal) Collected: 04/19/23 0207    Specimen: Blood from Hand, Left Updated: 04/21/23 0230     Blood Culture No growth at 2 days    POC Glucose Once [[id number]]  (Abnormal)  Collected: 04/21/23 0048    Specimen: Blood Updated: 04/21/23 0059     Glucose 236 mg/dL      Comment: : [id number] Juan José PascualiMeter ID: [id number]       T. Pallidum (Syphilis) Screening Cascade [[id number]] Collected: 04/19/23 1001    Specimen: Blood Updated: 04/20/23 1910     T pallidum Antibodies (TP-PA) Non Reactive    Narrative:      Performed at:  [address] [place] [address]  [id number]  : Tino Escalante MD, Phone:  [phone]    POC Glucose Once [[id number]]  (Abnormal) Collected: 04/20/23 1742    Specimen: Blood Updated: 04/20/23 1754     Glucose 204 mg/dL      Comment: : [id number] Phoenix Skinner ID: [id number]           Rahul Arnold APRN

## 2023-04-21 NOTE — PROGRESS NOTES
RT EQUIPMENT DEVICE RELATED - SKIN ASSESSMENT    RT Medical Equipment/Device:     ETT Martin/[employer]fast    Skin Assessment:      Cheek:  Intact  Neck:  Intact  Mouth:  Intact    Device Skin Pressure Protection:  Skin-to-device areas padded:  Anchorfast    Nurse Notification:  Kylie Motta, CRT

## 2023-04-21 NOTE — PROGRESS NOTES
Infectious Diseases Progress Note    Patient:  Elijah Escobar  YOB: 1955  MRN: [id number]   Admit date: 3/31/2023   Admitting Physician: Flaco Portillo, *  Primary Care Physician: Brianna Martinez APRN    Chief Complaint/Interval History: He is without significant fever.  Blood pressure is stable.  Heart rate atrial fibrillation and has been variable.    Intake/Output Summary (Last 24 hours) at 4/21/2023 1606  Last data filed at 4/21/2023 1208  Gross per 24 hour   Intake 2275.52 ml   Output 1600 ml   Net 675.52 ml     Allergies: No Known Allergies  Current Scheduled Medications:   aspirin, 81 mg, Oral, Daily  atorvastatin, 20 mg, Oral, Nightly  budesonide, 0.5 mg, Nebulization, BID - RT  Chlorhexidine Gluconate Cloth, 1 application, Topical, Q24H  digoxin, 250 mcg, Oral, Daily  dilTIAZem, 90 mg, Oral, Q8H  furosemide, 40 mg, Intravenous, BID  gabapentin, 300 mg, Oral, Q8H  insulin detemir, 20 Units, Subcutaneous, Q12H  insulin lispro, 0-14 Units, Subcutaneous, TID AC  ipratropium, 0.5 mg, Nebulization, 4x Daily - RT  levETIRAcetam, 500 mg, Oral, BID  levoFLOXacin, 500 mg, Oral, Q24H  methylnaltrexone, 12 mg, Subcutaneous, Every Other Day  metoclopramide, 10 mg, Oral, 4x Daily AC & at Bedtime  metoprolol tartrate, 50 mg, Oral, Q12H  multivitamin with minerals, 1 tablet, Oral, Daily  pantoprazole, 40 mg, Intravenous, Q AM  polyethylene glycol, 17 g, Oral, Daily  sennosides-docusate, 1 tablet, Oral, Daily  sodium chloride, 10 mL, Intravenous, Q12H  sucralfate, 1 g, Oral, 4x Daily AC & at Bedtime  vancomycin, 1,250 mg, Intravenous, Q12H      Current PRN Medications:  •  acetaminophen **OR** acetaminophen **OR** acetaminophen  •  butalbital-acetaminophen-caffeine  •  calcium carbonate  •  dextrose  •  dextrose  •  glucagon (human recombinant)  •  hydrOXYzine  •  ipratropium  •  ipratropium-albuterol  •  LORazepam  •  Morphine  •  nicotine  •  ondansetron **OR** ondansetron  •   "oxyCODONE-acetaminophen  •  oxyCODONE-acetaminophen  •  sodium chloride  •  sodium chloride    Review of Systems see HPI    Vital Signs:  /75   Pulse 111   Temp 100.1 °F (37.8 °C) (Axillary)   Resp 20   Ht 177.8 cm (70\")   Wt 126 kg (278 lb 12.8 oz)   SpO2 99%   BMI 40.00 kg/m²     Physical Exam  Vital signs - reviewed.  Line/IV site - No erythema, warmth, induration, or tenderness.  Lungs clear without crackles  Abdomen soft and nondistended  Heart irregular    Lab Results:  CBC:   Results from last 7 days   Lab Units 04/19/23  0657 04/18/23  0413 04/17/23  0308   WBC 10*3/mm3 13.61* 16.70* 17.15*   HEMOGLOBIN g/dL 10.2* 10.2* 9.7*   HEMATOCRIT % 33.8* 33.4* 31.1*   PLATELETS 10*3/mm3 163 203 227     BMP:  Results from last 7 days   Lab Units 04/21/23  0223 04/19/23  0657 04/18/23  0233 04/17/23  0308 04/16/23  2320   SODIUM mmol/L 138 138 141 141 140   POTASSIUM mmol/L 3.7 4.2 4.6 5.3* 5.3*   CHLORIDE mmol/L 100 101 103 103 102   CO2 mmol/L 28.0 24.0 28.0 26.0 25.0   BUN mg/dL 21 16 15 15 14   CREATININE mg/dL 1.04 1.26 1.49* 1.44* 1.38*   GLUCOSE mg/dL 265* 139* 70 165* 201*   CALCIUM mg/dL 7.9* 7.7* 7.9* 8.0* 7.7*   ALT (SGPT) U/L 32 60* 106* 159*  --      Culture Results:   Blood Culture   Date Value Ref Range Status   04/19/2023 No growth at 2 days  Preliminary   04/19/2023 No growth at 2 days  Preliminary     Urine Culture   Date Value Ref Range Status   04/19/2023 No growth  Final     Radiology: None  Additional Studies Reviewed: None    Impression:   1.  Prior infection at cranioplasty site-Serratia  2.  He had fever-improved.  Cultures negative.  3.  Respiratory failure following PEA-oxygen requirement stable to improved  4.  Previous episode of PEA    Recommendations:   Completing empiric course of vancomycin and meropenem  Continue supportive care  No new recommendations from ID standpoint    Edwin Jeffers MD  "

## 2023-04-21 NOTE — PROGRESS NOTES
"Pharmacy Dosing Service  Pharmacokinetics  Vancomycin Follow-up Evaluation    Assessment/Action/Plan:  Current Order: Vancomycin 1000 mg IVPB every 12 hours adjusting to Vancomycin 1250 mg iv q 12hrs  Current end date:42/24/23  Levels: Trough level in the low therapeutic range (see below)  Additional antimicrobial agent(s): Levaquin    Trough level from yesterday evaluated--in the low therapeutic range.  Dose adjusted to Vancomycin 1250 mg iv q 12hrs . Pharmacy will continue to follow daily and adjust dose accordingly.     Subjective:  Elijah Escobar is a 67 y.o. male currently on Vancomycin 1250 mg IV every 12 hours for the treatment of Sepsis, day 3 of treatment.    AUC Model Data:  Regimen: 1250 mg IV every 12 hours.  Exposure target: AUC24 (range)400-600 mg/L.hr   AUC24,ss: 484 mg/L.hr  PAUC*: 79 %  Ctrough,ss: 16.4 mg/L  Pconc*: 28 %  Tox.: 12 %      Objective:  Ht: 177.8 cm (70\"); Wt: 126 kg (278 lb 12.8 oz)  Estimated Creatinine Clearance: 91.8 mL/min (by C-G formula based on SCr of 1.04 mg/dL).   Creatinine   Date Value Ref Range Status   04/21/2023 1.04 0.76 - 1.27 mg/dL Final   04/19/2023 1.26 0.76 - 1.27 mg/dL Final   04/18/2023 1.49 (H) 0.76 - 1.27 mg/dL Final   08/31/2022 0.80 0.60 - 1.30 mg/dL Final     Comment:     Serial Number: [id number]:  [id number]   10/21/2020 1.03 0.60 - 1.30 mg/dL Final   02/07/2020 1.00 0.60 - 1.30 mg/dL Final   02/07/2020 1.00 0.60 - 1.30 mg/dL Final      Lab Results   Component Value Date    WBC 13.61 (H) 04/19/2023    WBC 16.70 (H) 04/18/2023    WBC 17.15 (H) 04/17/2023         Lab Results   Component Value Date    VANCOTROUGH 12.60 04/20/2023       Culture Results:  Microbiology Results (last 10 days)       Procedure Component Value - Date/Time    Chlamydia trachomatis, Neisseria gonorrhoeae, PCR - Swab, Penis [[id number]]  (Normal) Collected: 04/19/23 0936    Lab Status: Final result Specimen: Swab from Penis Updated: 04/19/23 1425     Chlamydia DNA by PCR Not Detected " "    Neisseria gonorrhoeae by PCR Not Detected    Narrative:      Disclaimer: The Aptima Combo 2 assay is a target amplification nucleic acid probe test that utilizes target capture for the in vitro qualitative detection and differentiation of ribosomal RNA from Chlamydia trachomatis and Neisseria gonorrhoeae to aid in the diagnosis of chlamydial and/or gonococcal urogenital disease.  Cell culture was once considered to be the \"gold standard\" for detection of CT and NG.  Culture is quite specific, but scientific studies have demonstrated that the NAAT DNA probe technologies have higher clinical sensitivities than culture.    Genital Culture - Swab, Penis [[id number]] Collected: 04/19/23 0936    Lab Status: Preliminary result Specimen: Swab from Penis Updated: 04/20/23 0854     Genital Culture No growth     Gram Stain Few (2+) WBCs seen      No organisms seen    Urine Culture - Urine, Indwelling Urethral Catheter [[id number]]  (Normal) Collected: 04/19/23 0307    Lab Status: Final result Specimen: Urine from Indwelling Urethral Catheter Updated: 04/20/23 1207     Urine Culture No growth    Blood Culture - Blood, Hand, Right [[id number]]  (Normal) Collected: 04/19/23 0209    Lab Status: Preliminary result Specimen: Blood from Hand, Right Updated: 04/21/23 0230     Blood Culture No growth at 2 days    Blood Culture - Blood, Hand, Left [[id number]]  (Normal) Collected: 04/19/23 0207    Lab Status: Preliminary result Specimen: Blood from Hand, Left Updated: 04/21/23 0230     Blood Culture No growth at 2 days            NALLELY Mccurdy RPH   04/21/23 09:07 CDT    "

## 2023-04-21 NOTE — THERAPY TREATMENT NOTE
Acute Care - Physical Therapy Treatment Note  [facility]     Patient Name: Eliajh Escobar  : 1955  MRN: [id number]  Today's Date: 2023      Visit Dx:     ICD-10-CM ICD-9-CM   1. Intracranial epidural hematoma  S06.4X0A 852.40   2. Impaired mobility  Z74.09 799.89   3. S/P craniotomy  Z98.890 V45.89   4. Infection of deep incisional surgical site after procedure, initial encounter  T81.42XA 998.59   5. Meningioma s/p craniotomy  D32.9 225.2   6. Type 2 diabetes mellitus with hyperglycemia and peripheral neuropathy, with long-term current use of insulin (HCC)  E11.65 250.00    Z79.4 790.29     V58.67   7. Paroxysmal atrial fibrillation with rapid ventricular response  I48.0 427.31   8. Swelling of scalp  R22.0 784.2   9. Acute pulmonary edema due to A-fib RVR  J81.0 518.4   10. Acute respiratory failure with hypoxia  J96.01 518.81   11. Type 2 myocardial infarction due to arrhythmia  I49.9 427.9    I21.A1 410.90   12. Obesity (BMI 30-39.9)  E66.9 278.00   13. Tobacco abuse, in remission  F17.201 305.1   14. Acute systolic heart failure  I50.21 428.21   15. ADDIE (obstructive sleep apnea)  G47.33 327.23   16. Secondary hypertension  I15.9 405.99   17. Gastroesophageal reflux disease, unspecified whether esophagitis present  K21.9 530.81   18. Class 2 severe obesity due to excess calories with serious comorbidity and body mass index (BMI) of 38.0 to 38.9 in adult  E66.01 278.01    Z68.38 V85.38   19. Leukocytosis, unspecified type  D72.829 288.60   20. Anemia due to other cause, not classified  D64.89 285.8   21. Smoker  F17.200 305.1   22. History of cranioplasty  Z98.890 V45.89   23. Facial edema  R60.0 782.3   24. Coronary artery disease due to lipid rich plaque  I25.10 414.00    I25.83 414.3   25. Postoperative infection, unspecified type, initial encounter  T81.40XA 998.59   26. Chronic obstructive pulmonary disease, unspecified COPD type  J44.9 496     Patient Active Problem List   Diagnosis   •  Meningioma s/p craniotomy   • Hypertension   • GERD (gastroesophageal reflux disease)   • Coronary artery disease   • Class 2 severe obesity due to excess calories with serious comorbidity and body mass index (BMI) of 38.0 to 38.9 in adult   • Type 2 diabetes mellitus with hyperglycemia and peripheral neuropathy, with long-term current use of insulin (HCC)   • Leukocytosis   • Other specified anemias   • Paroxysmal atrial fibrillation with rapid ventricular response   • Post-operative infection   • Smoker   • History of cranioplasty   • Facial edema   • Swelling of scalp   • Acute pulmonary edema due to A-fib RVR   • Acute respiratory failure with hypoxia   • Type 2 myocardial infarction due to arrhythmia   • COPD (chronic obstructive pulmonary disease)   • Obesity (BMI 30-39.9)   • Tobacco abuse, in remission   • Acute systolic heart failure   • ADDIE (obstructive sleep apnea)   • Intracranial epidural hematoma   • Elevated liver function tests     Past Medical History:   Diagnosis Date   • Brain tumor    • Coronary artery disease    • COVID-19 vaccine series completed     MODERNA X 3; LAST DOSE 3/2022   • Diabetes    • Erectile dysfunction    • GERD (gastroesophageal reflux disease)    • Hypercholesteremia    • Hypertension    • Seizure      Past Surgical History:   Procedure Laterality Date   • BALLOON ANGIOPLASTY, ARTERY Right    • COLONOSCOPY     • CRANIECTOMY Right 7/1/2022    Procedure: CRANIECTOMY WITH INCISIONAL WASHOUT;  Surgeon: Felipe Banerjee MD;  Location:  [facility];  Service: Neurosurgery;  Laterality: Right;   • CRANIOPLASTY Right 10/4/2022    Procedure: CRANIOPLASTY right with Lumbar drain;  Surgeon: Flaco Portillo MD;  Location:  [facility];  Service: Neurosurgery;  Laterality: Right;   • CRANIOPLASTY N/A 4/4/2023    Procedure: CRANIOPLASTY, removal, right, horseshoe;  Surgeon: Flaco Portillo MD;  Location:  [facility];  Service: Neurosurgery;  Laterality: N/A;   • CRANIOTOMY Right  4/12/2023    Procedure: CRANIOTOMY; evacuation of right hematoma;  Surgeon: Flaco Portillo MD;  Location:  [facility];  Service: Neurosurgery;  Laterality: Right;   • CRANIOTOMY FOR TUMOR Right 6/16/2022    Procedure: CRANIOTOMY FOR TUMOR STERIOTACTIC WITH BRAIN LAB, right;  Surgeon: Flaco Portillo MD;  Location:  [facility];  Service: Neurosurgery;  Laterality: Right;     PT Assessment (last 12 hours)     PT Evaluation and Treatment     Row Name 04/21/23 1510 04/21/23 1040       Physical Therapy Time and Intention    Document Type therapy note (daily note)  -TB --    Mode of Treatment physical therapy  -TB --    Session Not Performed -- patient unavailable for treatment  -TB    Comment, Session Not Performed -- Getting testing done  -TB    Comment Vent  -TB --    Row Name 04/21/23 1510          General Information    Existing Precautions/Restrictions fall;oxygen therapy device and L/min  -TB     Row Name 04/21/23 1510          Motor Skills    Comments, Therapeutic Exercise PROM BLE and BUE  -TB     Row Name             Wound 04/04/23 1111 Right temporal region Incision    Wound - Properties Group Placement Date: 04/04/23  -CHRISTIANO Placement Time: 1111  -CHRISTIANO Side: Right  -CHRISTIANO Location: temporal region  -CHRISTIANO Primary Wound Type: Incision  -CHRISTIANO    Retired Wound - Properties Group Placement Date: 04/04/23  -CHRISTIANO Placement Time: 1111  -CHRISTIANO Side: Right  -CHRISTIANO Location: temporal region  -CHRISTIANO Primary Wound Type: Incision  -CHRISTIANO    Retired Wound - Properties Group Date first assessed: 04/04/23  -CHRISTIANO Time first assessed: 1111  -CHRISTIANO Side: Right  -CHRISTIANO Location: temporal region  -CHRISTIANO Primary Wound Type: Incision  -CHRISTIANO    Row Name             Wound 04/12/23 1622 Right scalp Incision    Wound - Properties Group Placement Date: 04/12/23  -SAFIA Placement Time: 1622  -SAFIA Side: Right  -SAFIA Location: scalp  -SAFIA Primary Wound Type: Incision  -SAFIA    Retired Wound - Properties Group Placement Date: 04/12/23  -SAFIA Placement Time: 1622  -SAFIA Side: Right  " -SAFIA Location: scalp  -SAFIA Primary Wound Type: Incision  -SAFIA    Retired Wound - Properties Group Date first assessed: 04/12/23  -SAFIA Time first assessed: 1622  -SAFIA Side: Right  -SAFIA Location: scalp  -SAFIA Primary Wound Type: Incision  -SAFIA    Row Name 04/21/23 1510          Plan of Care Review    Plan of Care Reviewed With patient  -TB     Outcome Evaluation Pt on the vent. Performed PROM BUE and BLE. Noted pt to be \"coughing\" and when he would do so his eyes would roll back in his head and his head would pull back and he would shake for a few secinds before his body would relax again. Noted 3-4 times this happened during session. RN notified.  -TB     Row Name 04/21/23 1510          Positioning and Restraints    Pre-Treatment Position in bed  -TB     Post Treatment Position bed  -TB     In Bed side lying left;call light within reach;encouraged to call for assist  -TB           User Key  (r) = Recorded By, (t) = Taken By, (c) = Cosigned By    Initials Name Provider Type    SAFIA Kierra Irby RN Registered Nurse    Ricky George RN Registered Nurse    Tere Berger, [place]LAH Physical Therapist Assistant                Physical Therapy Education     Title: PT OT SLP Therapies (In Progress)     Topic: Physical Therapy (In Progress)     Point: Mobility training (In Progress)     Learning Progress Summary           Patient Acceptance, E, NR by JUAN at 4/11/2023 0809    Comment: Looking ahead during gait    Acceptance, E,D, DU,VU by JUAN at 4/10/2023 1447    Comment: Safety with transfers, please take time, no impulsiveness    Acceptance, E,TB, VU by CM at 4/6/2023 1350    Acceptance, E, VU by BRYSON at 4/5/2023 1605    Comment: helmet fit, safety, POC    Acceptance, E, VU by CHRISTIANO at 4/3/2023 1353    Comment: gait, safety,    Acceptance, E, VU by SB at 4/1/2023 1405    Comment: pt edu on POC, benefits of act, PLB and d/c plans   Family Acceptance, E,TB, VU by AMY at 4/6/2023 1350                   Point: Home exercise program " "(In Progress)     Learning Progress Summary           Patient Acceptance, E, NR by JESSICA at 4/19/2023 0815    Comment: progression of PT    Acceptance, E,TB, VU by CM at 4/6/2023 1350   Family Acceptance, E,TB, VU by CM at 4/6/2023 1350                   Point: Body mechanics (Done)     Learning Progress Summary           Patient Acceptance, E,TB, VU by CM at 4/6/2023 1350   Family Acceptance, E,TB, VU by CM at 4/6/2023 1350                   Point: Precautions (Done)     Learning Progress Summary           Patient Acceptance, E,TB, VU by CM at 4/6/2023 1350    Acceptance, E, VU by SB at 4/5/2023 1605    Comment: helmet fit, safety, POC    Acceptance, E, VU by SB at 4/1/2023 1405    Comment: pt edu on POC, benefits of act, PLB and d/c plans   Family Acceptance, E,TB, VU by CM at 4/6/2023 1350                               User Key     Initials Effective Dates Name Provider Type Discipline    JESSICA 02/03/23 -  Bruce Pillai, PT DPT Physical Therapist PT    JUAN 02/03/23 -  Carrie Callaway, PTA Physical Therapist Assistant PT    CHRISTIANO 02/03/23 -  Александр Saravia, [place]LA[place] Physical Therapist Assistant PT    CM 03/02/23 -  Dianelys Carlton, RN Registered Nurse Nurse    SB 06/16/21 -  Erika Javier, PT DPT Physical Therapist PT              PT Recommendation and Plan     Plan of Care Reviewed With: patient  Progress: improving  Outcome Evaluation: Pt on the vent. Performed PROM BUE and BLE. Noted pt to be \"coughing\" and when he would do so his eyes would roll back in his head and his head would pull back and he would shake for a few secinds before his body would relax again. Noted 3-4 times this happened during session. RN notified.   Outcome Measures     Row Name 04/21/23 1510 04/20/23 0922          How much help from another person do you currently need...    Turning from your back to your side while in flat bed without using bedrails? 1  -TB 1  -TB     Moving from lying on back to sitting on the side of a flat bed without " bedrails? 1  -TB 1  -TB     Moving to and from a bed to a chair (including a wheelchair)? 1  -TB 1  -TB     Standing up from a chair using your arms (e.g., wheelchair, bedside chair)? 1  -TB 1  -TB     Climbing 3-5 steps with a railing? 1  -TB 1  -TB     To walk in hospital room? 1  -TB 1  -TB     AM-PAC 6 Clicks Score (PT) 6  -TB 6  -TB        Functional Assessment    Outcome Measure Options AM-PAC 6 Clicks Basic Mobility (PT)  -TB AM-PAC 6 Clicks Basic Mobility (PT)  -TB           User Key  (r) = Recorded By, (t) = Taken By, (c) = Cosigned By    Initials Name Provider Type    Tere Berger PTA Physical Therapist Assistant                 Time Calculation:    PT Charges     Row Name 04/21/23 1543             Time Calculation    Start Time 1510  -TB      Stop Time 1536  -TB      Time Calculation (min) 26 min  -TB      PT Received On 04/21/23  -TB         Time Calculation- PT    Total Timed Code Minutes- PT 26 minute(s)  -TB            User Key  (r) = Recorded By, (t) = Taken By, (c) = Cosigned By    Initials Name Provider Type    Tere Berger PTA Physical Therapist Assistant              Therapy Charges for Today     Code Description Service Date Service Provider Modifiers Qty    31828621217 HC PT THER PROC EA 15 MIN 4/20/2023 Tere Miller, PTA GP 2    98428464302 HC PT THER PROC EA 15 MIN 4/21/2023 Tere Miller PTA GP 2          PT G-Codes  Outcome Measure Options: AM-PAC 6 Clicks Basic Mobility (PT)  AM-PAC 6 Clicks Score (PT): 6  AM-PAC 6 Clicks Score (OT): 20    Tere Miller PTA  4/21/2023

## 2023-04-21 NOTE — PLAN OF CARE
"Goal Outcome Evaluation:  Plan of Care Reviewed With: patient        Progress: improving  Outcome Evaluation: Pt on the vent. Performed PROM BUE and BLE. Noted pt to be \"coughing\" and when he would do so his eyes would roll back in his head and his head would pull back and he would shake for a few secinds before his body would relax again. Noted 3-4 times this happened during session. RN notified.  "

## 2023-04-21 NOTE — PROGRESS NOTES
[facility] Medicine Services  INPATIENT PROGRESS NOTE    Patient Name: Elijah Escobar  Date of Admission: 3/31/2023  Today's Date: 04/21/23  Length of Stay: 21  Primary Care Physician: Brianna Martinez APRN    Subjective   Chief Complaint: unable intubated, sedation currently off.     HPI   Tube feed remains at goal  5cc residual.  Blood sugar remains elevated.           Review of Systems   All pertinent negatives and positives are as above. All other systems have been reviewed and are negative unless otherwise stated.     Objective    Temp:  [99.3 °F (37.4 °C)-100.1 °F (37.8 °C)] 100.1 °F (37.8 °C)  Heart Rate:  [] 121  Resp:  [14-31] 17  BP: ()/() 126/89  FiO2 (%):  [40 %-70 %] 70 %  Physical Exam  Vitals and nursing note reviewed.   Constitutional:       Comments: unresponsive   HENT:      Head: Normocephalic.      Right Ear: External ear normal.      Left Ear: External ear normal.      Nose: Nose normal.      Mouth/Throat:      Mouth: Mucous membranes are moist.   Eyes:      Conjunctiva/sclera: Conjunctivae normal.   Cardiovascular:      Rate and Rhythm: Tachycardia present. Rhythm irregular.      Pulses: Normal pulses.      Heart sounds: No murmur heard.    No friction rub. No gallop.   Pulmonary:      Breath sounds: Normal breath sounds.      Comments: Remains on ventilator  Abdominal:      General: Bowel sounds are normal.      Palpations: Abdomen is soft.   Musculoskeletal:      Cervical back: No rigidity.      Right lower leg: No edema.      Left lower leg: No edema.   Skin:     General: Skin is warm and dry.   Neurological:      General: No focal deficit present.             Results Review:  I have reviewed the labs, radiology results, and diagnostic studies.    Laboratory Data:   Results from last 7 days   Lab Units 04/19/23  0657 04/18/23  0413 04/17/23  0308   WBC 10*3/mm3 13.61* 16.70* 17.15*   HEMOGLOBIN g/dL 10.2* 10.2* 9.7*   HEMATOCRIT %  33.8* 33.4* 31.1*   PLATELETS 10*3/mm3 163 203 227        Results from last 7 days   Lab Units 04/21/23  0223 04/19/23  0657 04/18/23  0233   SODIUM mmol/L 138 138 141   POTASSIUM mmol/L 3.7 4.2 4.6   CHLORIDE mmol/L 100 101 103   CO2 mmol/L 28.0 24.0 28.0   BUN mg/dL 21 16 15   CREATININE mg/dL 1.04 1.26 1.49*   CALCIUM mg/dL 7.9* 7.7* 7.9*   BILIRUBIN mg/dL 0.5 0.5 0.3   ALK PHOS U/L 93 89 92   ALT (SGPT) U/L 32 60* 106*   AST (SGOT) U/L 25 17 38   GLUCOSE mg/dL 265* 139* 70       Culture Data:   Blood Culture   Date Value Ref Range Status   04/19/2023 No growth at 2 days  Preliminary   04/19/2023 No growth at 2 days  Preliminary     Urine Culture   Date Value Ref Range Status   04/19/2023 No growth  Final       Radiology Data:   Imaging Results (Last 24 Hours)     Procedure Component Value Units Date/Time    XR Chest 1 View [[id number]] Collected: 04/21/23 0655     Updated: 04/21/23 0659    Narrative:      EXAM/TECHNIQUE: XR CHEST 1 VW-     INDICATION: Intubated and sedated patient     COMPARISON: 4/20/2023     FINDINGS:     Endotracheal tube is 4.4 cm above the robert. Enteric tube is in the mid  stomach. RIGHT IJ CVL tip overlies the cavoatrial junction.     Cardiac silhouette is mildly enlarged but stable. No large pleural  effusion or visible pneumothorax. Similar-appearing central vascular  congestion with perihilar airspace opacities and diffuse interstitial  opacity, suspected to represent pulmonary edema.       Impression:         No change in appearance of the chest.  This report was finalized on 04/21/2023 06:56 by Dr. Mk Chinchilla MD.          I have reviewed the patient's current medications.     Assessment/Plan   Assessment  Active Hospital Problems    Diagnosis    • **Swelling of scalp    • Elevated liver function tests    • COPD (chronic obstructive pulmonary disease)    • Obesity (BMI 30-39.9)    • Tobacco abuse, in remission    • Acute systolic heart failure    • ADDIE (obstructive sleep apnea)     • Type 2 myocardial infarction due to arrhythmia    • Acute pulmonary edema due to A-fib RVR    • Acute respiratory failure with hypoxia    • Intracranial epidural hematoma    • Post-operative infection    • Paroxysmal atrial fibrillation with rapid ventricular response    • Type 2 diabetes mellitus with hyperglycemia and peripheral neuropathy, with long-term current use of insulin (HCC)    • Coronary artery disease    • Meningioma s/p craniotomy        Treatment Plan  Continue tube feed  Increase insulin  Ok PT      Medical Decision Making  Number and Complexity of problems:   ) exacerbation of respiratory failure with hypoxia secondary to pulmonary edema, acute, high complexity  2) PAF with RVR, acute, moderate complexity  3) meningioma versus intracranial infection, acute, high complexity  4) type 2 diabetes, chronic, moderate complexity  5) elevated liver enzymes acute moderate complexity  6) elevated WBC acute moderate complexity  Differential Diagnosis: Pulmonary embolus, LV dysfunction     Conditions and Status        Condition is unchanged.     [facility] Data  External documents reviewed: Care everywhere documentation  Cardiac tracing (EKG, telemetry) interpretation: See HPI  Radiology interpretation: See HPI  Labs reviewed: See HPI  Any tests that were considered but not ordered: None     Decision rules/scores evaluated (example [id number], Wells, etc): [id number] score of 5 with a 7.2% risk of stroke annually     Discussed with:  nursing      Care Planning  Shared decision making: Patient is sedated  Code status and discussions: full     Disposition  Social Determinants of Health that impact treatment or disposition: none  I expect the patient to be discharged by the primary service.     Electronically signed by Susanne Milligan, 04/21/23, 12:23 CDT.

## 2023-04-21 NOTE — SIGNIFICANT NOTE
04/21/23 0854   Readings   Plateau Pressure (cm H2O) 17 cm H2O   Driving Pressure (cm H2O) 9.9 cm H2O   Dynamic Compliance (L/cm H2O) 162 L/cm H2O

## 2023-04-22 NOTE — PLAN OF CARE
Goal Outcome Evaluation:     Problem: Communication Impairment (Mechanical Ventilation, Invasive)  Goal: Effective Communication  Outcome: Ongoing, Progressing     Problem: Device-Related Complication Risk (Mechanical Ventilation, Invasive)  Goal: Optimal Device Function  Outcome: Ongoing, Progressing     Problem: Inability to Wean (Mechanical Ventilation, Invasive)  Goal: Mechanical Ventilation Liberation  Outcome: Ongoing, Progressing     Problem: Skin and Tissue Injury (Mechanical Ventilation, Invasive)  Goal: Absence of Device-Related Skin and Tissue Injury  Outcome: Ongoing, Progressing     Problem: Ventilator-Induced Lung Injury (Mechanical Ventilation, Invasive)  Goal: Absence of Ventilator-Induced Lung Injury  Outcome: Ongoing, Progressing

## 2023-04-22 NOTE — PROGRESS NOTES
[facility] PULMONARY AND CRITICAL CARE PROGRESS NOTE - Psychiatric    Patient: Elijah Escobar    1955    MR# [id number]    Acct# [id number]  04/22/23   07:43 CDT  Referring Provider: Flaco Portillo, *    Chief Complaint: Mechanically ventilated    Interval history: The patient is resting in bed with endotracheal tube to mechanical ventilator.  D#6 ETT.  Nursing is at bedside. EEG with no epileptiform activity or ongoing seizures. O2 sat 100% on 8 PEEP, 0.70 FiO2.  ABGs reviewed.  Vent changes to Pi decreased to 12, peep decreased to 5, Rate decreased to 10. He is passively breathing. Vt in the 600s, MV 7.71. Max temp 100.1 at noon yesterday. CXR with pulmonary edema persists. continues lasix 40mg IV BID. He is much more calm today with resumption of home gabapentin. He has had one dose of Morphine yesterday and another last night. No other aggravating or alleviating factors.     Meds:  aspirin, 81 mg, Oral, Daily  atorvastatin, 20 mg, Oral, Nightly  budesonide, 0.5 mg, Nebulization, BID - RT  Chlorhexidine Gluconate Cloth, 1 application, Topical, Q24H  digoxin, 250 mcg, Oral, Daily  dilTIAZem, 90 mg, Oral, Q8H  furosemide, 40 mg, Intravenous, BID  gabapentin, 300 mg, Oral, Q8H  insulin detemir, 20 Units, Subcutaneous, Q12H  insulin lispro, 0-14 Units, Subcutaneous, Q6H  ipratropium, 0.5 mg, Nebulization, 4x Daily - RT  levETIRAcetam, 500 mg, Oral, BID  levoFLOXacin, 500 mg, Oral, Q24H  methylnaltrexone, 12 mg, Subcutaneous, Every Other Day  metoclopramide, 10 mg, Oral, 4x Daily AC & at Bedtime  metoprolol tartrate, 50 mg, Oral, Q12H  multivitamin with minerals, 1 tablet, Oral, Daily  pantoprazole, 40 mg, Intravenous, Q AM  polyethylene glycol, 17 g, Oral, Daily  sennosides-docusate, 1 tablet, Oral, Daily  sodium chloride, 10 mL, Intravenous, Q12H  sucralfate, 1 g, Oral, 4x Daily AC & at Bedtime  vancomycin, 1,250 mg, Intravenous, Q12H      norepinephrine, 0.02-0.3 mcg/kg/min, Last Rate:  Stopped (04/17/23 2005)  propofol, 5-50 mcg/kg/min, Last Rate: Stopped (04/21/23 0945)  vasopressin, 0.03 Units/min, Last Rate: Stopped (04/16/23 2027)        Ventilator Settings:        Resp Rate (Set): 12     FiO2 (%): 40 %  PEEP/CPAP (cm H2O): 8 cm H20  Minute Ventilation (L/min) (Obs): 9.04 L/min  Resp Rate (Observed) Vent: 12  I:E Ratio (Set): 1:3.55  I:E Ratio (Obs): 1:2.7  PIP Observed (cm H2O): 28 cm H2O     Physical Exam:  Temp:  [98.2 °F (36.8 °C)-100.1 °F (37.8 °C)] 99.3 °F (37.4 °C)  Heart Rate:  [] 89  Resp:  [12-28] 12  BP: ()/() 104/73  FiO2 (%):  [40 %-70 %] 40 %    Intake/Output Summary (Last 24 hours) at 4/22/2023 0743  Last data filed at 4/22/2023 0400  Gross per 24 hour   Intake 1858.36 ml   Output 1350 ml   Net 508.36 ml     SpO2 Percentage    04/22/23 0638 04/22/23 0700 04/22/23 0730   SpO2: 100% 100% 100%   Body mass index is 40.98 kg/m².   Physical Exam   Constitutional:       Appearance: He is obese. He is ill-appearing.  Intubated and sedated  HENT:      Head: Normocephalic. Right scalp wound, well-healed, no redness warmth or drainage     Nose: Nose normal.      Mouth/Throat: ETT/OG/TF  Eyes:      General:         Right eye: No discharge.         Left eye: No discharge.   Cardiovascular:      Rate and Rhythm: tachycardia   Pulmonary:      Effort: increased work of breathing      Breath sounds: No wheezing.  Diminished in bases  Abdominal:      General: Abdomen is flat.      Palpations: Abdomen is soft.    Musculoskeletal:       Right lower leg: No edema.      Left lower leg: No edema.   Skin:     General: Skin is warm and dry.      Coloration: Skin is not jaundiced or pale.   Neurological:      General: Intubated and sedated     Electronically signed by ADITYA Ferrell, 4/22/2023, 07:43 CDT      Time spent 10 min.     Physician substantive portion: medical decision making    Result Review    Laboratory Data:  Results from last 7 days   Lab Units 04/19/23  0657  "04/18/23  0413 04/17/23  0308   WBC 10*3/mm3 13.61* 16.70* 17.15*   HEMOGLOBIN g/dL 10.2* 10.2* 9.7*   PLATELETS 10*3/mm3 163 203 227     Results from last 7 days   Lab Units 04/22/23  0256 04/21/23  0223 04/19/23  0657 04/17/23  0308 04/16/23  2320   SODIUM mmol/L 139 138 138   < > 140   POTASSIUM mmol/L 3.8 3.7 4.2   < > 5.3*   CO2 mmol/L 28.0 28.0 24.0   < > 25.0   BUN mg/dL 29* 21 16   < > 14   CREATININE mg/dL 1.15 1.04 1.26   < > 1.38*   CRP mg/dL  --   --   --   --  1.39*    < > = values in this interval not displayed.     Results from last 7 days   Lab Units 04/22/23  0355 04/21/23  1412 04/21/23  0407   PH, ARTERIAL pH units 7.458* 7.492* 7.421   PCO2, ARTERIAL mm Hg 45.0 41.2 42.9   PO2 ART mm Hg 167.0* 223.0* 68.1*   FIO2 % 50 70 60     Microbiology Results (last 10 days)     Procedure Component Value - Date/Time    Chlamydia trachomatis, Neisseria gonorrhoeae, PCR - Swab, Penis [[id number]]  (Normal) Collected: 04/19/23 0936    Lab Status: Final result Specimen: Swab from Penis Updated: 04/19/23 1425     Chlamydia DNA by PCR Not Detected     Neisseria gonorrhoeae by PCR Not Detected    Narrative:      Disclaimer: The Aptima Combo 2 assay is a target amplification nucleic acid probe test that utilizes target capture for the in vitro qualitative detection and differentiation of ribosomal RNA from Chlamydia trachomatis and Neisseria gonorrhoeae to aid in the diagnosis of chlamydial and/or gonococcal urogenital disease.  Cell culture was once considered to be the \"gold standard\" for detection of CT and NG.  Culture is quite specific, but scientific studies have demonstrated that the NAAT DNA probe technologies have higher clinical sensitivities than culture.    Genital Culture - Swab, Penis [[id number]] Collected: 04/19/23 0936    Lab Status: Final result Specimen: Swab from Penis Updated: 04/22/23 0549     Genital Culture No growth at 3 days     Gram Stain Few (2+) WBCs seen      No organisms seen    Urine " Culture - Urine, Indwelling Urethral Catheter [[id number]]  (Normal) Collected: 04/19/23 0307    Lab Status: Final result Specimen: Urine from Indwelling Urethral Catheter Updated: 04/20/23 1207     Urine Culture No growth    Blood Culture - Blood, Hand, Right [[id number]]  (Normal) Collected: 04/19/23 0209    Lab Status: Preliminary result Specimen: Blood from Hand, Right Updated: 04/22/23 0230     Blood Culture No growth at 3 days    Blood Culture - Blood, Hand, Left [[id number]]  (Normal) Collected: 04/19/23 0207    Lab Status: Preliminary result Specimen: Blood from Hand, Left Updated: 04/22/23 0230     Blood Culture No growth at 3 days         Recent films:  EEG    Result Date: 4/21/2023  Reason for referral: 67 y.o.male with altered mental status Technical Summary:  A 19 channel digital EEG was performed using the international 10-20 placement system, including eye leads and EKG leads. Duration: 27 minutes Findings: The patient is intubated and unresponsive.  Head movement artifact is noted diffuse low amplitude 2-5 Hz delta and theta activity is seen over the left hemisphere.  Higher amplitude delta and theta activity are seen over the right frontotemporal head leads, likely representing a breach effect due to prior craniotomy.  No definitive epileptiform activity or electrographic seizures are seen.  Photic stimulation and hyperventilation are not performed.  There is no change in the background to mechanical stimulation. Video: Available Technical quality: Superior SUMMARY: Moderate generalized slow and suppressed Breach rhythm, right frontotemporal (due to prior craniotomy) No epileptiform activity or ongoing seizures are seen     Impression: Diffuse cerebral dysfunction of moderate degree but nonspecific No ongoing seizures are seen This report is transcribed using the Dragon dictation system.      XR Chest 1 View    Result Date: 4/22/2023  EXAMINATION: XR CHEST 1 VW- 4/22/2023 8:06 AM CDT  HISTORY:  Intubated and sedated; S06.4X0A-Epidural hemorrhage without loss of consciousness, initial encounter; Z74.09-Other reduced mobility; Z98.890-Other specified postprocedural states; T81.42XA-Infection following a procedure, deep incisional surgical site, initial encounter; D32.9-Benign neoplasm of meninges, unspecified; E11.65-Type 2 diabetes mellitus with hyperglycemia; Z79.4-Long term (current) u  REPORT: A frontal view the chest was obtained.  COMPARISON: Chest x-ray 04/21/2023 0313 hours.  The patient remains intubated, the tip of the endotracheal tube is approximately 7 cm superior to the robert and appears slightly retracted compared with yesterday. Consider advancing the tube up to 3 cm. The right internal jugular central line tip projects over the cavoatrial junction as before, satisfactory. A nasogastric tube is present and reaches the stomach. There is extensive volume loss in the lung bases and central vascular congestion is present, with borderline cardiomegaly. No pneumothorax or pleural effusion.      Impression: Slight retraction of the endotracheal tube compared with yesterday, consider advancing the tube to 3 cm. The other life-support lines appear in satisfactory position as before. Continued hypoaeration of lungs with no focal consolidation. Vascular congestion persists, correlate clinically for mild volume overload. This report was finalized on 04/22/2023 08:08 by Dr. Elijah Grover MD.    XR Chest 1 View    Result Date: 4/21/2023  EXAM/TECHNIQUE: XR CHEST 1 VW-  INDICATION: Intubated and sedated patient  COMPARISON: 4/20/2023  FINDINGS:  Endotracheal tube is 4.4 cm above the robert. Enteric tube is in the mid stomach. RIGHT IJ CVL tip overlies the cavoatrial junction.  Cardiac silhouette is mildly enlarged but stable. No large pleural effusion or visible pneumothorax. Similar-appearing central vascular congestion with perihilar airspace opacities and diffuse interstitial opacity, suspected to  represent pulmonary edema.      Impression:  No change in appearance of the chest. This report was finalized on 04/21/2023 06:56 by Dr. Mk Chinchilla MD.    XR Abdomen KUB    Result Date: 4/21/2023  EXAMINATION: XR ABDOMEN KUB- 4/21/2023 3:25 PM CDT  HISTORY: OG placement; S06.4X0A-Epidural hemorrhage without loss of consciousness, initial encounter; Z74.09-Other reduced mobility; Z98.890-Other specified postprocedural states; T81.42XA-Infection following a procedure, deep incisional surgical site, initial encounter; D32.9-Benign neoplasm of meninges, unspecified; E11.65-Type 2 diabetes mellitus with hyperglycemia; Z79.4-Long term (current) use of ins  REPORT: Supine imaging of the abdomen was performed.  COMPARISON: KUB 04/17/2023.  The tip of the NG tube projects over the gastric fundus, in satisfactory position. There is volume loss in the lung bases. The visualized bowel gas pattern appears unremarkable. The patient is intubated and a right internal jugular central line is present, good position. The ET tube appears to be in satisfactory position.      Impression: Satisfactory position of the NG tube, as well as the right IJ central line and endotracheal tube. Mild volume loss in the lung bases. This report was finalized on 04/21/2023 15:26 by Dr. Elijah Grover MD.     Personal review of imaging: CXR shows ett high, pulm edema/atelectasis    Pulmonary Assessment:  1. Acute resp failure with hypoxia, requiring mechanical vent, threat to life and bodily function  2. Ventilator dyssynchrony improved with change in vent setup and medications  3. Encephalopathy, metabolic    Recommend/plan:   · Vent changed,  Reduce rr to 10, peep 5, Pi 12.  Serial adjustments made at bedside on our visit with these settings seeming optimal  · Begin spontaneous breathing trials  · Try to reduce propofol; judicious use of bolus sedatives for RASS -1    Critical Care Time: 40 minutes, 10 minutes by aprn above plus 25 additional  minutes by me.  Critical care time was necessary to treat or prevent imminent or life-threatening deterioration of the following conditions: circulatory failure, CNS failure or compromise and respiratory failure.  Time was spent by me performing the following activities: evaluation of patient's response to treatment, examination of patient, obtaining history from patient or surrogate, ordering and review of labaratory studies, ordering and reveiw of radiographic studies, re-evaluation of patient's condition and ventilator management.   This visit was performed by both a physician and an [facility] RN.  I personally evaluated and examined the patient.  I performed all aspects of the medical decision making as documented.  Electronically signed by Ajay Root MD, 4/22/2023, 10:34 CDT

## 2023-04-22 NOTE — SIGNIFICANT NOTE
04/22/23 0931   Readings   Plateau Pressure (cm H2O) 18 cm H2O   Driving Pressure (cm H2O) 13.2 cm H2O   Static Compliance (L/cm H2O) 53   Dynamic Compliance (L/cm H2O) 79 L/cm H2O

## 2023-04-22 NOTE — PROGRESS NOTES
"Pharmacy Dosing Service  Pharmacokinetics  Vancomycin Follow-up Evaluation    Assessment/Action/Plan:  Current Order: Vancomycin 1250 mg IVPB every 12 hours  Current end date:4/24/2023  Levels: 4/22/2023 1307 Vancomycin trough = 22.2 (9 hours post 1250 mg dose) [id number] = 661  Previously on 4/20/2023 1353 Vancomycin trough = 12.6 (11.5 hours post 1000 mg dose)   Additional antimicrobial agent(s): Merrem  SCr = 1.15 (up from 1.04)  Vancomycin dose adjusted 1000 mg iv q12h. Pharmacy will continue to follow daily and adjust dose accordingly.      Subjective:  Elijah Escobar is a 67 y.o. male currently on Vancomycin for the treatment of sepsis, day 4 of treatment.    AUC Model Data:  Regimen: 1000 mg IV every 12 hours.  Exposure target: AUC24 (range)400-600 mg/L.hr   AUC24,ss: 545 mg/L.hr  PAUC*: 99 %  Ctrough,ss: 18.1 mg/L  Pconc*: 27 %  Tox.: 14 %    Objective:  Ht: 177.8 cm (70\"); Wt: 130 kg (285 lb 9.6 oz)  Estimated Creatinine Clearance: 84.5 mL/min (by C-G formula based on SCr of 1.15 mg/dL).   Creatinine   Date Value Ref Range Status   04/22/2023 1.15 0.76 - 1.27 mg/dL Final   04/21/2023 1.04 0.76 - 1.27 mg/dL Final   04/19/2023 1.26 0.76 - 1.27 mg/dL Final   08/31/2022 0.80 0.60 - 1.30 mg/dL Final     Comment:     Serial Number: [id number]:  [id number]   10/21/2020 1.03 0.60 - 1.30 mg/dL Final   02/07/2020 1.00 0.60 - 1.30 mg/dL Final   02/07/2020 1.00 0.60 - 1.30 mg/dL Final      Lab Results   Component Value Date    WBC 13.61 (H) 04/19/2023    WBC 16.70 (H) 04/18/2023    WBC 17.15 (H) 04/17/2023         Lab Results   Component Value Date    VANCOTROUGH 22.20 (H) 04/22/2023       Culture Results:  Microbiology Results (last 10 days)       Procedure Component Value - Date/Time    Chlamydia trachomatis, Neisseria gonorrhoeae, PCR - Swab, Penis [[id number]]  (Normal) Collected: 04/19/23 0936    Lab Status: Final result Specimen: Swab from Penis Updated: 04/19/23 1425     Chlamydia DNA by PCR Not Detected     " "Neisseria gonorrhoeae by PCR Not Detected    Narrative:      Disclaimer: The Aptima Combo 2 assay is a target amplification nucleic acid probe test that utilizes target capture for the in vitro qualitative detection and differentiation of ribosomal RNA from Chlamydia trachomatis and Neisseria gonorrhoeae to aid in the diagnosis of chlamydial and/or gonococcal urogenital disease.  Cell culture was once considered to be the \"gold standard\" for detection of CT and NG.  Culture is quite specific, but scientific studies have demonstrated that the NAAT DNA probe technologies have higher clinical sensitivities than culture.    Genital Culture - Swab, Penis [[id number]] Collected: 04/19/23 0936    Lab Status: Final result Specimen: Swab from Penis Updated: 04/22/23 0549     Genital Culture No growth at 3 days     Gram Stain Few (2+) WBCs seen      No organisms seen    Urine Culture - Urine, Indwelling Urethral Catheter [[id number]]  (Normal) Collected: 04/19/23 0307    Lab Status: Final result Specimen: Urine from Indwelling Urethral Catheter Updated: 04/20/23 1207     Urine Culture No growth    Blood Culture - Blood, Hand, Right [[id number]]  (Normal) Collected: 04/19/23 0209    Lab Status: Preliminary result Specimen: Blood from Hand, Right Updated: 04/22/23 0230     Blood Culture No growth at 3 days    Blood Culture - Blood, Hand, Left [[id number]]  (Normal) Collected: 04/19/23 0207    Lab Status: Preliminary result Specimen: Blood from Hand, Left Updated: 04/22/23 0230     Blood Culture No growth at 3 days            Mk Stahl, PharmD   04/22/23 16:22 CDT    "

## 2023-04-22 NOTE — PROGRESS NOTES
Infectious Diseases Progress Note    Patient:  Elijah Escobar  YOB: 1955  MRN: [id number]   Admit date: 3/31/2023   Admitting Physician: Flaco Portillo, *  Primary Care Physician: Brianna Martinez APRN    Chief Complaint/Interval History: He is without fever.  Blood pressure is stable.  Variable heart rate.  Remains on 50% FiO2.    Intake/Output Summary (Last 24 hours) at 4/22/2023 1347  Last data filed at 4/22/2023 1204  Gross per 24 hour   Intake 1871.37 ml   Output 1200 ml   Net 671.37 ml     Allergies: No Known Allergies  Current Scheduled Medications:   aspirin, 81 mg, Oral, Daily  atorvastatin, 20 mg, Oral, Nightly  budesonide, 0.5 mg, Nebulization, BID - RT  Chlorhexidine Gluconate Cloth, 1 application, Topical, Q24H  digoxin, 250 mcg, Oral, Daily  dilTIAZem, 90 mg, Oral, Q8H  furosemide, 40 mg, Intravenous, BID  gabapentin, 300 mg, Oral, Q8H  insulin detemir, 20 Units, Subcutaneous, Q12H  insulin lispro, 0-14 Units, Subcutaneous, Q6H  ipratropium, 0.5 mg, Nebulization, 4x Daily - RT  levETIRAcetam, 500 mg, Oral, BID  levoFLOXacin, 500 mg, Oral, Q24H  methylnaltrexone, 12 mg, Subcutaneous, Every Other Day  metoclopramide, 10 mg, Oral, 4x Daily AC & at Bedtime  metoprolol tartrate, 50 mg, Oral, Q12H  multivitamin with minerals, 1 tablet, Oral, Daily  pantoprazole, 40 mg, Intravenous, Q AM  polyethylene glycol, 17 g, Oral, Daily  sennosides-docusate, 1 tablet, Oral, Daily  sodium chloride, 10 mL, Intravenous, Q12H  sucralfate, 1 g, Oral, 4x Daily AC & at Bedtime  vancomycin, 1,250 mg, Intravenous, Q12H      Current PRN Medications:  •  acetaminophen **OR** acetaminophen **OR** acetaminophen  •  butalbital-acetaminophen-caffeine  •  calcium carbonate  •  dextrose  •  dextrose  •  glucagon (human recombinant)  •  hydrOXYzine  •  ipratropium  •  ipratropium-albuterol  •  LORazepam  •  Morphine  •  nicotine  •  ondansetron **OR** ondansetron  •  oxyCODONE-acetaminophen  •   "oxyCODONE-acetaminophen  •  sodium chloride  •  sodium chloride    Review of Systems unobtainable from patient due to mental status    Vital Signs:  /86   Pulse 103   Temp 97.7 °F (36.5 °C) (Axillary)   Resp 19   Ht 177.8 cm (70\")   Wt 130 kg (285 lb 9.6 oz)   SpO2 97%   BMI 40.98 kg/m²     Physical Exam  Vital signs - reviewed.  Line/IV site - No erythema, warmth, induration, or tenderness.  Lungs without crackles or wheezes  Abdomen soft.  Bowel sounds present.    Lab Results:  CBC:   Results from last 7 days   Lab Units 04/19/23  0657 04/18/23  0413 04/17/23  0308   WBC 10*3/mm3 13.61* 16.70* 17.15*   HEMOGLOBIN g/dL 10.2* 10.2* 9.7*   HEMATOCRIT % 33.8* 33.4* 31.1*   PLATELETS 10*3/mm3 163 203 227     BMP:  Results from last 7 days   Lab Units 04/22/23  0256 04/21/23  0223 04/19/23  0657 04/18/23  0233 04/17/23  0308 04/16/23  2320   SODIUM mmol/L 139 138 138 141 141 140   POTASSIUM mmol/L 3.8 3.7 4.2 4.6 5.3* 5.3*   CHLORIDE mmol/L 102 100 101 103 103 102   CO2 mmol/L 28.0 28.0 24.0 28.0 26.0 25.0   BUN mg/dL 29* 21 16 15 15 14   CREATININE mg/dL 1.15 1.04 1.26 1.49* 1.44* 1.38*   GLUCOSE mg/dL 232* 265* 139* 70 165* 201*   CALCIUM mg/dL 7.8* 7.9* 7.7* 7.9* 8.0* 7.7*   ALT (SGPT) U/L 24 32 60* 106* 159*  --      Culture Results:   Blood Culture   Date Value Ref Range Status   04/19/2023 No growth at 3 days  Preliminary   04/19/2023 No growth at 3 days  Preliminary     Urine Culture   Date Value Ref Range Status   04/19/2023 No growth  Final     Radiology: None    Additional Studies Reviewed: None    Impression:   1.  Prior infection at cranioplasty site with Serratia  2.  Previous fever-improved  3.  Respiratory failure following episode of pulseless electrical activity-stable    Recommendations:   No change in antibiotic recommendation at present  Continue vancomycin and meropenem  Monitor mental status  Continue to follow    Edwin Jeffers MD  "

## 2023-04-22 NOTE — PROGRESS NOTES
Neurosurgery Daily Progress Note    HPI:  Elijah Escobar is a 67 y.o. male with a significant medical history of hypertension, diabetes, hyperlipidemia, seizures, craniotomy for tumor (6/16/2022), craniotomy for washout (7/1/2022), craniectomy (10/4/2022), coronary artery disease, diabetes, erectile dysfunction, GERD, hypertension, hyperlipidemia, tobacco abuse, and obesity.  He presents today with a complaint of a 4-5 days onset of progressively worsening right frontal, temporal, and periorbital edema and associated symptoms to include, but not limited to generalized fatigue, chills, dyspnea, intermittent nausea without vomiting, and states he's felt feverish.  Physical exam findings of neurologically intact with right frontal, temporal, and periorbital swelling.  WBC elevated at 15.  3 to an CRP elevated at 14.75.  Imaging pending.    Assessment:   Past Medical History:   Diagnosis Date   • Brain tumor    • Coronary artery disease    • COVID-19 vaccine series completed     MODERNA X 3; LAST DOSE 3/2022   • Diabetes    • Erectile dysfunction    • GERD (gastroesophageal reflux disease)    • Hypercholesteremia    • Hypertension    • Seizure      Active Hospital Problems    Diagnosis    • **Swelling of scalp    • Elevated liver function tests    • COPD (chronic obstructive pulmonary disease)    • Obesity (BMI 30-39.9)    • Tobacco abuse, in remission    • Acute systolic heart failure    • ADDIE (obstructive sleep apnea)    • Type 2 myocardial infarction due to arrhythmia    • Acute pulmonary edema due to A-fib RVR    • Acute respiratory failure with hypoxia    • Intracranial epidural hematoma    • Post-operative infection    • Paroxysmal atrial fibrillation with rapid ventricular response    • Type 2 diabetes mellitus with hyperglycemia and peripheral neuropathy, with long-term current use of insulin (HCC)    • Coronary artery disease    • Meningioma s/p craniotomy      Plan:   Neuro:   With sedation paused, gag and  cough intact, pupillary response intact, does not follow commands, withdraws with lower extremities to painful stimuli.    Infected cranial flap      POD #17 (4/4/2023)  craniectomy and washout    Postop code CT and MRI stable. No acute abnormalities.    Flap tapped I&D cultures 4+ gram-negative bacilli    Heavy 4+ Serratia marcescens     10 Days Post-Op (4/13/2023) evacuation of scalp hematoma    JUAN removed     Continue neuro exams per policy.  Call for decline  EEG shows no epileptiform activity or ongoing seizures    CV: Code for PEA yesterday afternoon, 4/16/2023.  Required epinephrine and bicarb   AFib/flutter, rate controlled.   Hold all anticoagulation for 2 weeks.   No evidence of DVT or PE per imaging   Appreciate cardiology  Pulm: Remains intubated on low-dose propofol.     Atelectasis versus pneumonia left lung      :  Voiding per Jeong catheter.  Trace leuks.  TNTC WBC  FEN: Currently NPO  Endocrine: Glucose improving.  Continue SSI  GI: SHERIF.  +BM  ID:  Infected cranial flap  CSF: no growth to date   PNA.  Resolved   UTI.  Resolved   Continue antibiotics.  Currently on Levaquin and vancomycin  Heme:  DVT prophylaxis with SCDs.  Hold all anticoagulation for 2 weeks.  Pain: No complaints at present  Dispo: Pending extubation     Chief complaint:   4-5 days onset of progressively worsening right frontal, temporal, and periorbital edema and associated symptoms to include, but not limited to generalized fatigue, chills, dyspnea, intermittent nausea without vomiting, and states he's felt feverish.    HPI  Subjective  Confusion and slurred speech    Temp:  [98.2 °F (36.8 °C)-100.1 °F (37.8 °C)] 99.3 °F (37.4 °C)  Heart Rate:  [] 89  Resp:  [12-28] 12  BP: ()/(56-89) 110/65  FiO2 (%):  [40 %-70 %] 40 %    Output by Drain (mL) 04/21/23 0701 - 04/21/23 1900 04/21/23 1901 - 04/22/23 0700 04/22/23 0701 - 04/22/23 1011 Range Total   Requested LDAs do not have output data documented.  "    Objective:  Vital signs: (most recent): Blood pressure 110/65, pulse 89, temperature 99.3 °F (37.4 °C), temperature source Axillary, resp. rate 12, height 177.8 cm (70\"), weight 130 kg (285 lb 9.6 oz), SpO2 99 %.      Neurologic Exam     Mental Status      With sedation paused gag and cough intact, pupillary response intact, does not follow commands.       Cranial Nerves     CN III, IV, VI   Pupils are equal, round, and reactive to light.    CN IX, X   Right gag reflex: normal  Left gag reflex: normal    Motor Exam   Withdraws to painful stimuli to lower extremities     Drains: * No LDAs found *    Imaging Results (Last 24 Hours)     Procedure Component Value Units Date/Time    XR Chest 1 View [[id number]] Collected: 04/22/23 0806     Updated: 04/22/23 0812    Narrative:      EXAMINATION: XR CHEST 1 VW- 4/22/2023 8:06 AM CDT     HISTORY: Intubated and sedated; S06.4X0A-Epidural hemorrhage without  loss of consciousness, initial encounter; Z74.09-Other reduced mobility;  Z98.890-Other specified postprocedural states; T81.42XA-Infection  following a procedure, deep incisional surgical site, initial encounter;  D32.9-Benign neoplasm of meninges, unspecified; E11.65-Type 2 diabetes  mellitus with hyperglycemia; Z79.4-Long term (current) u     REPORT: A frontal view the chest was obtained.     COMPARISON: Chest x-ray 04/21/2023 0313 hours.     The patient remains intubated, the tip of the endotracheal tube is  approximately 7 cm superior to the robert and appears slightly retracted  compared with yesterday. Consider advancing the tube up to 3 cm. The  right internal jugular central line tip projects over the cavoatrial  junction as before, satisfactory. A nasogastric tube is present and  reaches the stomach. There is extensive volume loss in the lung bases  and central vascular congestion is present, with borderline  cardiomegaly. No pneumothorax or pleural effusion.       Impression:      Slight retraction of the " endotracheal tube compared with  yesterday, consider advancing the tube to 3 cm. The other life-support  lines appear in satisfactory position as before. Continued hypoaeration  of lungs with no focal consolidation. Vascular congestion persists,  correlate clinically for mild volume overload.  This report was finalized on 04/22/2023 08:08 by Dr. Elijah Grover MD.    XR Abdomen KUB [[id number]] Collected: 04/21/23 1525     Updated: 04/21/23 1529    Narrative:      EXAMINATION: XR ABDOMEN KUB- 4/21/2023 3:25 PM CDT     HISTORY: OG placement; S06.4X0A-Epidural hemorrhage without loss of  consciousness, initial encounter; Z74.09-Other reduced mobility;  Z98.890-Other specified postprocedural states; T81.42XA-Infection  following a procedure, deep incisional surgical site, initial encounter;  D32.9-Benign neoplasm of meninges, unspecified; E11.65-Type 2 diabetes  mellitus with hyperglycemia; Z79.4-Long term (current) use of ins     REPORT: Supine imaging of the abdomen was performed.     COMPARISON: KUB 04/17/2023.     The tip of the NG tube projects over the gastric fundus, in satisfactory  position. There is volume loss in the lung bases. The visualized bowel  gas pattern appears unremarkable. The patient is intubated and a right  internal jugular central line is present, good position. The ET tube  appears to be in satisfactory position.       Impression:      Satisfactory position of the NG tube, as well as the right  IJ central line and endotracheal tube. Mild volume loss in the lung  bases.  This report was finalized on 04/21/2023 15:26 by Dr. Elijah Grover MD.        Lab Results (last 24 hours)     Procedure Component Value Units Date/Time    POC Glucose Once [[id number]]  (Abnormal) Collected: 04/22/23 0626    Specimen: Blood Updated: 04/22/23 0637     Glucose 234 mg/dL      Comment: : [id number] Juan José KristiMeter ID: [id number]       Genital Culture - Swab, Penis [[id number]] Collected: 04/19/23 0936     Specimen: Swab from Penis Updated: 04/22/23 0549     Genital Culture No growth at 3 days     Gram Stain Few (2+) WBCs seen      No organisms seen    Blood Gas, Arterial - [[id number]]  (Abnormal) Collected: 04/22/23 0355    Specimen: Arterial Blood Updated: 04/22/23 0356     Site Right Radial     Marek's Test Positive     pH, Arterial 7.458 pH units      Comment: 83 Value above reference range        pCO2, Arterial 45.0 mm Hg      pO2, Arterial 167.0 mm Hg      Comment: 83 Value above reference range        HCO3, Arterial 31.8 mmol/L      Comment: 83 Value above reference range        Base Excess, Arterial 7.1 mmol/L      Comment: 83 Value above reference range        O2 Saturation, Arterial 99.8 %      Comment: 83 Value above reference range        Temperature 37.0 C      Barometric Pressure for Blood Gas 748 mmHg      Modality Ventilator     FIO2 50 %      Ventilator Mode PC     Set Mech Resp Rate 12.0     PEEP 8.0     PIP 20 cmH2O      Comment: Meter: [id number]     :  [id number]        Collected by [id number]     pCO2, Temperature Corrected 45.0 mm Hg      pH, Temp Corrected 7.458 pH Units      pO2, Temperature Corrected 167 mm Hg     Comprehensive Metabolic Panel [[id number]]  (Abnormal) Collected: 04/22/23 0256    Specimen: Blood Updated: 04/22/23 0344     Glucose 232 mg/dL      BUN 29 mg/dL      Creatinine 1.15 mg/dL      Sodium 139 mmol/L      Potassium 3.8 mmol/L      Chloride 102 mmol/L      CO2 28.0 mmol/L      Calcium 7.8 mg/dL      Total Protein 5.2 g/dL      Albumin 2.4 g/dL      ALT (SGPT) 24 U/L      AST (SGOT) 30 U/L      Alkaline Phosphatase 83 U/L      Total Bilirubin 0.4 mg/dL      Globulin 2.8 gm/dL      A/G Ratio 0.9 g/dL      BUN/Creatinine Ratio 25.2     Anion Gap 9.0 mmol/L      eGFR 69.8 mL/min/1.73     Narrative:      GFR Normal >60  Chronic Kidney Disease <60  Kidney Failure <15      Blood Culture - Blood, Hand, Right [[id number]]  (Normal) Collected: 04/19/23 0209    Specimen: Blood  from Hand, Right Updated: 04/22/23 0230     Blood Culture No growth at 3 days    Blood Culture - Blood, Hand, Left [[id number]]  (Normal) Collected: 04/19/23 0207    Specimen: Blood from Hand, Left Updated: 04/22/23 0230     Blood Culture No growth at 3 days    POC Glucose Once [[id number]]  (Abnormal) Collected: 04/21/23 2347    Specimen: Blood Updated: 04/21/23 2358     Glucose 240 mg/dL      Comment: : [id number] Juan José MoeistiMeter ID: [id number]       POC Glucose Once [[id number]]  (Abnormal) Collected: 04/21/23 1728    Specimen: Blood Updated: 04/21/23 1744     Glucose 172 mg/dL      Comment: : [id number] [place] SYLVESTEReter ID: [id number]       AFB Culture - Cerebrospinal Fluid, Lumbar Puncture [[id number]] Collected: 03/31/23 1450    Specimen: Cerebrospinal Fluid from Lumbar Puncture Updated: 04/21/23 1530     AFB Culture No AFB isolated at 3 weeks     AFB Stain No acid fast bacilli seen on direct smear    Blood Gas, Arterial - [[id number]]  (Abnormal) Collected: 04/21/23 1412    Specimen: Arterial Blood Updated: 04/21/23 1411     Site Right Brachial     Marek's Test N/A     pH, Arterial 7.492 pH units      Comment: 83 Value above reference range        pCO2, Arterial 41.2 mm Hg      pO2, Arterial 223.0 mm Hg      Comment: 83 Value above reference range        HCO3, Arterial 31.5 mmol/L      Comment: 83 Value above reference range        Base Excess, Arterial 7.5 mmol/L      Comment: 83 Value above reference range        O2 Saturation, Arterial 100.0 %      Comment: 83 Value above reference range        Temperature 37.0 C      Barometric Pressure for Blood Gas 749 mmHg      Modality Ventilator     FIO2 70 %      Ventilator Mode PC     Set [facility] Resp Rate 14.0     PEEP 8.0     Collected by [id number]     Comment: Meter: [id number]     :  [id number]        pCO2, Temperature Corrected 41.2 mm Hg      pH, Temp Corrected 7.492 pH Units      pO2, Temperature Corrected 223 mm Hg     POC Glucose Once  [[id number]]  (Abnormal) Collected: 04/21/23 1202    Specimen: Blood Updated: 04/21/23 1213     Glucose 288 mg/dL      Comment: : [id number] [place] Skinner ID: [id number]           Flaco Portillo MD

## 2023-04-22 NOTE — PLAN OF CARE
Goal Outcome Evaluation:  Plan of Care Reviewed With: patient        Progress: no change  Outcome Evaluation: opens eyes to voice but does not track or follow commands. PRN Morphine x 1 for vent dyssonchrony. Tolerating tube feeding. Turn Q2.

## 2023-04-22 NOTE — PROGRESS NOTES
"Pharmacy Dosing Service  Pharmacokinetics  Vancomycin Follow-up Evaluation    Assessment/Action/Plan:  Current Order: Vancomycin 1250 mg IVPB every 12 hours  Current end date:4/24/2023  Levels: Vancomycin trough ordered before dose due at 1500  Previously on 4/20/2023 1353 Vancomycin trough = 12.6 (11.5 hours post 1000 mg dose)   Additional antimicrobial agent(s): Merrem  SCr = 1.15 (up from 1.04)  Vancomycin 1250 mg iv q12h. Pharmacy will continue to follow daily and adjust dose accordingly.     Subjective:  Elijah Escobar is a 67 y.o. male currently on Vancomycin for the treatment of sepsis, day 4 of treatment.    AUC Model Data:  Regimen: 1250 mg IV every 12 hours.  Exposure target: AUC24 (range)400-600 mg/L.hr   AUC24,ss: 569 mg/L.hr  PAUC*: 95 %  Ctrough,ss: 13.3 mg/L  Pconc*: 0 %  Tox.: 8 %      Objective:  Ht: 177.8 cm (70\"); Wt: 130 kg (285 lb 9.6 oz)  Estimated Creatinine Clearance: 84.5 mL/min (by C-G formula based on SCr of 1.15 mg/dL).   Creatinine   Date Value Ref Range Status   04/22/2023 1.15 0.76 - 1.27 mg/dL Final   04/21/2023 1.04 0.76 - 1.27 mg/dL Final   04/19/2023 1.26 0.76 - 1.27 mg/dL Final   08/31/2022 0.80 0.60 - 1.30 mg/dL Final     Comment:     Serial Number: [id number]:  [id number]   10/21/2020 1.03 0.60 - 1.30 mg/dL Final   02/07/2020 1.00 0.60 - 1.30 mg/dL Final   02/07/2020 1.00 0.60 - 1.30 mg/dL Final      Lab Results   Component Value Date    WBC 13.61 (H) 04/19/2023    WBC 16.70 (H) 04/18/2023    WBC 17.15 (H) 04/17/2023         Lab Results   Component Value Date    VANCOTROUGH 12.60 04/20/2023       Culture Results:  Microbiology Results (last 10 days)       Procedure Component Value - Date/Time    Chlamydia trachomatis, Neisseria gonorrhoeae, PCR - Swab, Penis [[id number]]  (Normal) Collected: 04/19/23 0936    Lab Status: Final result Specimen: Swab from Penis Updated: 04/19/23 1425     Chlamydia DNA by PCR Not Detected     Neisseria gonorrhoeae by PCR Not Detected    Narrative: " "     Disclaimer: The Aptima Combo 2 assay is a target amplification nucleic acid probe test that utilizes target capture for the in vitro qualitative detection and differentiation of ribosomal RNA from Chlamydia trachomatis and Neisseria gonorrhoeae to aid in the diagnosis of chlamydial and/or gonococcal urogenital disease.  Cell culture was once considered to be the \"gold standard\" for detection of CT and NG.  Culture is quite specific, but scientific studies have demonstrated that the NAAT DNA probe technologies have higher clinical sensitivities than culture.    Genital Culture - Swab, Penis [[id number]] Collected: 04/19/23 0936    Lab Status: Final result Specimen: Swab from Penis Updated: 04/22/23 0549     Genital Culture No growth at 3 days     Gram Stain Few (2+) WBCs seen      No organisms seen    Urine Culture - Urine, Indwelling Urethral Catheter [[id number]]  (Normal) Collected: 04/19/23 0307    Lab Status: Final result Specimen: Urine from Indwelling Urethral Catheter Updated: 04/20/23 1207     Urine Culture No growth    Blood Culture - Blood, Hand, Right [[id number]]  (Normal) Collected: 04/19/23 0209    Lab Status: Preliminary result Specimen: Blood from Hand, Right Updated: 04/22/23 0230     Blood Culture No growth at 3 days    Blood Culture - Blood, Hand, Left [[id number]]  (Normal) Collected: 04/19/23 0207    Lab Status: Preliminary result Specimen: Blood from Hand, Left Updated: 04/22/23 0230     Blood Culture No growth at 3 days            Mk Stahl, PharmGIOVANI   04/22/23 08:16 CDT    "

## 2023-04-22 NOTE — PROGRESS NOTES
[facility] Medicine Services  INPATIENT PROGRESS NOTE    Patient Name: Elijah Escobar  Date of Admission: 3/31/2023  Today's Date: 04/22/23  Length of Stay: 22  Primary Care Physician: Brianna Martinez APRN    Subjective   Chief Complaint: patient remains on ventilator.  Sedation is off.  HPI   Per nursing is opening eyes to verbal command.  But no other real response.  Tube feeding is tolerated.  Minimal residual.  Patient is having stools.  Sugars remain elevated.  Insulin was changed yesterday.        Review of Systems   Unable patient not responsive.    Objective    Temp:  [98.2 °F (36.8 °C)-100.1 °F (37.8 °C)] 99.3 °F (37.4 °C)  Heart Rate:  [] 89  Resp:  [12-28] 12  BP: ()/() 104/73  FiO2 (%):  [40 %-70 %] 40 %  Physical Exam  Vitals and nursing note reviewed.   HENT:      Head: Normocephalic.      Right Ear: External ear normal.      Left Ear: External ear normal.      Nose: Nose normal.   Cardiovascular:      Rate and Rhythm: Normal rate. Rhythm irregular.      Pulses: Normal pulses.   Pulmonary:      Comments: Remains on ventilator.  Pulmonology managing  Abdominal:      General: Bowel sounds are normal.      Palpations: Abdomen is soft.   Musculoskeletal:      Cervical back: No rigidity.      Right lower leg: Edema present.      Left lower leg: Edema present.   Skin:     General: Skin is warm and dry.             Results Review:  I have reviewed the labs, radiology results, and diagnostic studies.    Laboratory Data:   Results from last 7 days   Lab Units 04/19/23  0657 04/18/23  0413 04/17/23  0308   WBC 10*3/mm3 13.61* 16.70* 17.15*   HEMOGLOBIN g/dL 10.2* 10.2* 9.7*   HEMATOCRIT % 33.8* 33.4* 31.1*   PLATELETS 10*3/mm3 163 203 227        Results from last 7 days   Lab Units 04/22/23  0256 04/21/23  0223 04/19/23  0657   SODIUM mmol/L 139 138 138   POTASSIUM mmol/L 3.8 3.7 4.2   CHLORIDE mmol/L 102 100 101   CO2 mmol/L 28.0 28.0 24.0   BUN mg/dL  29* 21 16   CREATININE mg/dL 1.15 1.04 1.26   CALCIUM mg/dL 7.8* 7.9* 7.7*   BILIRUBIN mg/dL 0.4 0.5 0.5   ALK PHOS U/L 83 93 89   ALT (SGPT) U/L 24 32 60*   AST (SGOT) U/L 30 25 17   GLUCOSE mg/dL 232* 265* 139*       Culture Data:   Blood Culture   Date Value Ref Range Status   04/19/2023 No growth at 3 days  Preliminary   04/19/2023 No growth at 3 days  Preliminary     Urine Culture   Date Value Ref Range Status   04/19/2023 No growth  Final       Radiology Data:   Imaging Results (Last 24 Hours)     Procedure Component Value Units Date/Time    XR Chest 1 View [[id number]] Resulted: 04/22/23 0402     Updated: 04/22/23 0402    XR Abdomen KUB [[id number]] Collected: 04/21/23 1525     Updated: 04/21/23 1529    Narrative:      EXAMINATION: XR ABDOMEN KUB- 4/21/2023 3:25 PM CDT     HISTORY: OG placement; S06.4X0A-Epidural hemorrhage without loss of  consciousness, initial encounter; Z74.09-Other reduced mobility;  Z98.890-Other specified postprocedural states; T81.42XA-Infection  following a procedure, deep incisional surgical site, initial encounter;  D32.9-Benign neoplasm of meninges, unspecified; E11.65-Type 2 diabetes  mellitus with hyperglycemia; Z79.4-Long term (current) use of ins     REPORT: Supine imaging of the abdomen was performed.     COMPARISON: KUB 04/17/2023.     The tip of the NG tube projects over the gastric fundus, in satisfactory  position. There is volume loss in the lung bases. The visualized bowel  gas pattern appears unremarkable. The patient is intubated and a right  internal jugular central line is present, good position. The ET tube  appears to be in satisfactory position.       Impression:      Satisfactory position of the NG tube, as well as the right  IJ central line and endotracheal tube. Mild volume loss in the lung  bases.  This report was finalized on 04/21/2023 15:26 by Dr. Elijah Grover MD.          I have reviewed the patient's current medications.     Assessment/Plan    Assessment  Active Hospital Problems    Diagnosis    • **Swelling of scalp    • Elevated liver function tests    • COPD (chronic obstructive pulmonary disease)    • Obesity (BMI 30-39.9)    • Tobacco abuse, in remission    • Acute systolic heart failure    • ADDIE (obstructive sleep apnea)    • Type 2 myocardial infarction due to arrhythmia    • Acute pulmonary edema due to A-fib RVR    • Acute respiratory failure with hypoxia    • Intracranial epidural hematoma    • Post-operative infection    • Paroxysmal atrial fibrillation with rapid ventricular response    • Type 2 diabetes mellitus with hyperglycemia and peripheral neuropathy, with long-term current use of insulin (HCC)    • Coronary artery disease    • Meningioma s/p craniotomy        Treatment Plan  Continue tube feeds at current rate  Monitor blood sugar routinely  Remains elevated will increase insulin     Medical Decision Making  Number and Complexity of problems:   ) exacerbation of respiratory failure with hypoxia secondary to pulmonary edema, acute, high complexity  2) PAF with RVR, acute, moderate complexity  3) meningioma versus intracranial infection, acute, high complexity  4) type 2 diabetes, chronic, moderate complexity  5) elevated liver enzymes acute moderate complexity  6) elevated WBC acute moderate complexity  Differential Diagnosis: Pulmonary embolus, LV dysfunction     Conditions and Status        Condition is unchanged.     [facility] Data  External documents reviewed: Care everywhere documentation  Cardiac tracing (EKG, telemetry) interpretation: See HPI  Radiology interpretation: See HPI  Labs reviewed: See HPI  Any tests that were considered but not ordered: None     Decision rules/scores evaluated (example [id number], Wells, etc): URR3YX3-UEPx score of 5 with a 7.2% risk of stroke annually     Discussed with:  nursing      Care Planning  Shared decision making: Patient is sedated  Code status and discussions: full     Disposition  Social  Determinants of Health that impact treatment or disposition: none  I expect the patient to be discharged by the primary service.   Electronically signed by Susanne Milligan, 04/22/23, 07:57 CDT.

## 2023-04-22 NOTE — PROGRESS NOTES
RT EQUIPMENT DEVICE RELATED - SKIN ASSESSMENT    RT Medical Equipment/Device:     ETT Martin/Anchorfast    Skin Assessment:      Cheek:  Intact  Neck:  Intact  Lips:  Intact  Mouth:  Intact    Device Skin Pressure Protection:  Skin-to-device areas padded:  Anchorfast    Nurse Notification:  Kylie Motta, CRT

## 2023-04-23 NOTE — THERAPY TREATMENT NOTE
Acute Care - Physical Therapy Treatment Note  [facility]     Patient Name: Elijah Escobar  : 1955  MRN: [id number]  Today's Date: 2023      Visit Dx:     ICD-10-CM ICD-9-CM   1. Intracranial epidural hematoma  S06.4X0A 852.40   2. Impaired mobility  Z74.09 799.89   3. S/P craniotomy  Z98.890 V45.89   4. Infection of deep incisional surgical site after procedure, initial encounter  T81.42XA 998.59   5. Meningioma s/p craniotomy  D32.9 225.2   6. Type 2 diabetes mellitus with hyperglycemia and peripheral neuropathy, with long-term current use of insulin (HCC)  E11.65 250.00    Z79.4 790.29     V58.67   7. Paroxysmal atrial fibrillation with rapid ventricular response  I48.0 427.31   8. Swelling of scalp  R22.0 784.2   9. Acute pulmonary edema due to A-fib RVR  J81.0 518.4   10. Acute respiratory failure with hypoxia  J96.01 518.81   11. Type 2 myocardial infarction due to arrhythmia  I49.9 427.9    I21.A1 410.90   12. Obesity (BMI 30-39.9)  E66.9 278.00   13. Tobacco abuse, in remission  F17.201 305.1   14. Acute systolic heart failure  I50.21 428.21   15. ADDIE (obstructive sleep apnea)  G47.33 327.23   16. Secondary hypertension  I15.9 405.99   17. Gastroesophageal reflux disease, unspecified whether esophagitis present  K21.9 530.81   18. Class 2 severe obesity due to excess calories with serious comorbidity and body mass index (BMI) of 38.0 to 38.9 in adult  E66.01 278.01    Z68.38 V85.38   19. Leukocytosis, unspecified type  D72.829 288.60   20. Anemia due to other cause, not classified  D64.89 285.8   21. Smoker  F17.200 305.1   22. History of cranioplasty  Z98.890 V45.89   23. Facial edema  R60.0 782.3   24. Coronary artery disease due to lipid rich plaque  I25.10 414.00    I25.83 414.3   25. Postoperative infection, unspecified type, initial encounter  T81.40XA 998.59   26. Chronic obstructive pulmonary disease, unspecified COPD type  J44.9 496     Patient Active Problem List   Diagnosis   •  Meningioma s/p craniotomy   • Hypertension   • GERD (gastroesophageal reflux disease)   • Coronary artery disease   • Class 2 severe obesity due to excess calories with serious comorbidity and body mass index (BMI) of 38.0 to 38.9 in adult   • Type 2 diabetes mellitus with hyperglycemia and peripheral neuropathy, with long-term current use of insulin (HCC)   • Leukocytosis   • Other specified anemias   • Paroxysmal atrial fibrillation with rapid ventricular response   • Post-operative infection   • Smoker   • History of cranioplasty   • Facial edema   • Swelling of scalp   • Acute pulmonary edema due to A-fib RVR   • Acute respiratory failure with hypoxia   • Type 2 myocardial infarction due to arrhythmia   • COPD (chronic obstructive pulmonary disease)   • Obesity (BMI 30-39.9)   • Tobacco abuse, in remission   • Acute systolic heart failure   • ADDIE (obstructive sleep apnea)   • Intracranial epidural hematoma   • Elevated liver function tests     Past Medical History:   Diagnosis Date   • Brain tumor    • Coronary artery disease    • COVID-19 vaccine series completed     MODERNA X 3; LAST DOSE 3/2022   • Diabetes    • Erectile dysfunction    • GERD (gastroesophageal reflux disease)    • Hypercholesteremia    • Hypertension    • Seizure      Past Surgical History:   Procedure Laterality Date   • BALLOON ANGIOPLASTY, ARTERY Right    • COLONOSCOPY     • CRANIECTOMY Right 7/1/2022    Procedure: CRANIECTOMY WITH INCISIONAL WASHOUT;  Surgeon: Feliep Banerjee MD;  Location:  [facility];  Service: Neurosurgery;  Laterality: Right;   • CRANIOPLASTY Right 10/4/2022    Procedure: CRANIOPLASTY right with Lumbar drain;  Surgeon: Flaco Portillo MD;  Location:  [facility];  Service: Neurosurgery;  Laterality: Right;   • CRANIOPLASTY N/A 4/4/2023    Procedure: CRANIOPLASTY, removal, right, horseshoe;  Surgeon: Flaco Portillo MD;  Location:  [facility];  Service: Neurosurgery;  Laterality: N/A;   • CRANIOTOMY Right  4/12/2023    Procedure: CRANIOTOMY; evacuation of right hematoma;  Surgeon: Flaco Portillo MD;  Location:  [facility];  Service: Neurosurgery;  Laterality: Right;   • CRANIOTOMY FOR TUMOR Right 6/16/2022    Procedure: CRANIOTOMY FOR TUMOR STERIOTACTIC WITH BRAIN LAB, right;  Surgeon: Flaco Portillo MD;  Location:  [facility];  Service: Neurosurgery;  Laterality: Right;     PT Assessment (last 12 hours)     PT Evaluation and Treatment     Row Name 04/23/23 0840          Physical Therapy Time and Intention    Subjective Information no complaints  vent  -LY     Document Type therapy note (daily note)  -LY     Mode of Treatment physical therapy  -LY     Row Name 04/23/23 0840          General Information    Existing Precautions/Restrictions fall;oxygen therapy device and L/min  vent  -LY     Row Name 04/23/23 0840          Pain Scale: FACES Pre/Post-Treatment    Pain: FACES Scale, Pretreatment 0-->no hurt  -LY     Posttreatment Pain Rating 0-->no hurt  -LY     Row Name 04/23/23 0840          Motor Skills    Comments, Therapeutic Exercise PROM completed to BUE and BLE d/t sedated on vent. Pt moves head spontaneously, not on command  -LY     Row Name             Wound 04/04/23 1111 Right temporal region Incision    Wound - Properties Group Placement Date: 04/04/23  -CHRISTIANO Placement Time: 1111  -CHRISTIANO Side: Right  -CHRISTIANO Location: temporal region  -CHRISTIANO Primary Wound Type: Incision  -CHRISTIANO    Retired Wound - Properties Group Placement Date: 04/04/23  -CHRISTIANO Placement Time: 1111  -CHRISTIANO Side: Right  -CHRISTIANO Location: temporal region  -CHRISTIANO Primary Wound Type: Incision  -CHRISTIANO    Retired Wound - Properties Group Date first assessed: 04/04/23  -CHRISTIANO Time first assessed: 1111  -CHRISTIANO Side: Right  -CHRISTIANO Location: temporal region  -CHRISTIANO Primary Wound Type: Incision  -CHRISTIANO    Row Name             Wound 04/12/23 1622 Right scalp Incision    Wound - Properties Group Placement Date: 04/12/23  -SAFIA Placement Time: 1622  -SAFIA Side: Right  -SAFIA Location: scalp   -SAFIA Primary Wound Type: Incision  -SAFIA    Retired Wound - Properties Group Placement Date: 04/12/23  -SAFIA Placement Time: 1622 -SAFIA Side: Right  -SAFIA Location: scalp  -SAFIA Primary Wound Type: Incision  -SAFIA    Retired Wound - Properties Group Date first assessed: 04/12/23  -SAFIA Time first assessed: 1622  -SAFIA Side: Right  -SAFIA Location: scalp  -SAFIA Primary Wound Type: Incision  -SAFIA    Row Name 04/23/23 0840          Plan of Care Review    Plan of Care Reviewed With patient  -LY     Progress improving  -LY     Outcome Evaluation PT tx completed. Pt cont sedated on vent. Not following commands. Moves head spontaneously. PROM performed to all extremities. Will cont to follow.  -LY     Row Name 04/23/23 0840          Positioning and Restraints    Pre-Treatment Position in bed  -LY     Post Treatment Position bed  -LY     In Bed fowlers;call light within reach;encouraged to call for assist;legs elevated;RUE elevated;LUE elevated;SCD pump applied  -LY     Restraints released:;reapplied:;soft limb  -LY           User Key  (r) = Recorded By, (t) = Taken By, (c) = Cosigned By    Initials Name Provider Type    SAFIA Kierra Irby, RN Registered Nurse    Ricky George RN Registered Nurse    Cary Martinez, PTA Physical Therapist Assistant                Physical Therapy Education     Title: PT OT SLP Therapies (In Progress)     Topic: Physical Therapy (In Progress)     Point: Mobility training (In Progress)     Learning Progress Summary           Patient Acceptance, E, NR by JUAN at 4/11/2023 0809    Comment: Looking ahead during gait    Acceptance, E,D, DU,VU by JUAN at 4/10/2023 1447    Comment: Safety with transfers, please take time, no impulsiveness    Acceptance, E,TB, VU by AMY at 4/6/2023 1350    Acceptance, E, VU by SB at 4/5/2023 1605    Comment: helmet fit, safety, POC    Acceptance, E, VU by CHRISTIANO at 4/3/2023 1353    Comment: gait, safety,    Acceptance, E, VU by SB at 4/1/2023 1405    Comment: pt edu on POC, benefits of  act, PLB and d/c plans   Family Acceptance, E,TB, VU by CM at 4/6/2023 1350                   Point: Home exercise program (In Progress)     Learning Progress Summary           Patient Acceptance, E, NR by JESSICA at 4/19/2023 0815    Comment: progression of PT    Acceptance, E,TB, VU by CM at 4/6/2023 1350   Family Acceptance, E,TB, VU by CM at 4/6/2023 1350                   Point: Body mechanics (Done)     Learning Progress Summary           Patient Acceptance, E,TB, VU by CM at 4/6/2023 1350   Family Acceptance, E,TB, VU by CM at 4/6/2023 1350                   Point: Precautions (Done)     Learning Progress Summary           Patient Acceptance, E,TB, VU by CM at 4/6/2023 1350    Acceptance, E, VU by SB at 4/5/2023 1605    Comment: helmet fit, safety, POC    Acceptance, E, VU by SB at 4/1/2023 1405    Comment: pt edu on POC, benefits of act, PLB and d/c plans   Family Acceptance, E,TB, VU by CM at 4/6/2023 1350                               User Key     Initials Effective Dates Name Provider Type Discipline    JESSICA 02/03/23 -  Bruce Pillai, PT DPT Physical Therapist PT    JUAN 02/03/23 -  Carrie Callaway, PTA Physical Therapist Assistant PT    CHRISTIANO 02/03/23 -  Александр Saravia PTA Physical Therapist Assistant PT    CM 03/02/23 -  Dianelys Carlton, RN Registered Nurse Nurse    SB 06/16/21 -  Erika Javier, PT DPT Physical Therapist PT              PT Recommendation and Plan     Plan of Care Reviewed With: patient  Progress: improving  Outcome Evaluation: PT tx completed. Pt cont sedated on vent. Not following commands. Moves head spontaneously. PROM performed to all extremities. Will cont to follow.   Outcome Measures     Row Name 04/23/23 0840 04/21/23 1510 04/20/23 0922       How much help from another person do you currently need...    Turning from your back to your side while in flat bed without using bedrails? 1  -LY 1  -TB 1  -TB    Moving from lying on back to sitting on the side of a flat bed without  bedrails? 1  -LY 1  -TB 1  -TB    Moving to and from a bed to a chair (including a wheelchair)? 1  -LY 1  -TB 1  -TB    Standing up from a chair using your arms (e.g., wheelchair, bedside chair)? 1  -LY 1  -TB 1  -TB    Climbing 3-5 steps with a railing? 1  -LY 1  -TB 1  -TB    To walk in hospital room? 1  -LY 1  -TB 1  -TB    AM-PAC 6 Clicks Score (PT) 6  -LY 6  -TB 6  -TB       Functional Assessment    Outcome Measure Options AM-PAC 6 Clicks Basic Mobility (PT)  -LY AM-PAC 6 Clicks Basic Mobility (PT)  -TB AM-PAC 6 Clicks Basic Mobility (PT)  -TB          User Key  (r) = Recorded By, (t) = Taken By, (c) = Cosigned By    Initials Name Provider Type    TB Tere Miller PTA Physical Therapist Assistant    Cary Martinez PTA Physical Therapist Assistant                 Time Calculation:    PT Charges     Row Name 04/23/23 0902             Time Calculation    Start Time 0840  -LY      Stop Time 0857  -LY      Time Calculation (min) 17 min  -LY      PT Received On 04/23/23  -LY         Time Calculation- PT    Total Timed Code Minutes- PT 17 minute(s)  -LY         Timed Charges    56463 - PT Therapeutic Exercise Minutes 17  -LY         Total Minutes    Timed Charges Total Minutes 17  -LY       Total Minutes 17  -LY            User Key  (r) = Recorded By, (t) = Taken By, (c) = Cosigned By    Initials Name Provider Type    LY Cary Burnham PTA Physical Therapist Assistant              Therapy Charges for Today     Code Description Service Date Service Provider Modifiers Qty    [id number] HC PT THER PROC EA 15 MIN 4/23/2023 Cary Burnham PTA GP 1          PT G-Codes  Outcome Measure Options: AM-PAC 6 Clicks Basic Mobility (PT)  AM-PAC 6 Clicks Score (PT): 6  AM-PAC 6 Clicks Score (OT): 20    Cary Burnham PTA  4/23/2023

## 2023-04-23 NOTE — PROGRESS NOTES
Infectious Diseases Progress Note    Patient:  Elijah Escobar  YOB: 1955  MRN: [id number]   Admit date: 3/31/2023   Admitting Physician: Flaco Portillo, *  Primary Care Physician: Brianna Martinez APRN    Chief Complaint/Interval History: He has had temperature elevation as high as 100.  Not likely significant.  Remains on 40% FiO2.  Atrial fibrillation.  Heart rate variable.  Blood pressures been stable.  He opens eyes.  He does not blink to threat.  He responds slightly to painful stimuli.  Not following commands.  He has been off sedation.  Still has some discharge from the penile area around the catheter.  Testing for chlamydia and gonorrhea negative.    Intake/Output Summary (Last 24 hours) at 4/23/2023 1154  Last data filed at 4/23/2023 0740  Gross per 24 hour   Intake 1707.59 ml   Output 1550 ml   Net 157.59 ml     Allergies: No Known Allergies  Current Scheduled Medications:   aspirin, 81 mg, Oral, Daily  atorvastatin, 20 mg, Oral, Nightly  budesonide, 0.5 mg, Nebulization, BID - RT  Chlorhexidine Gluconate Cloth, 1 application, Topical, Q24H  digoxin, 250 mcg, Oral, Daily  dilTIAZem, 90 mg, Oral, Q8H  furosemide, 40 mg, Intravenous, BID  gabapentin, 300 mg, Oral, Q8H  insulin detemir, 25 Units, Subcutaneous, Q12H  insulin lispro, 0-14 Units, Subcutaneous, Q6H  ipratropium, 0.5 mg, Nebulization, 4x Daily - RT  levETIRAcetam, 500 mg, Oral, BID  levoFLOXacin, 500 mg, Oral, Q24H  methylnaltrexone, 12 mg, Subcutaneous, Every Other Day  metoclopramide, 10 mg, Oral, 4x Daily AC & at Bedtime  metoprolol tartrate, 50 mg, Oral, Q12H  multivitamin with minerals, 1 tablet, Oral, Daily  pantoprazole, 40 mg, Intravenous, Q AM  polyethylene glycol, 17 g, Oral, Daily  sennosides-docusate, 1 tablet, Oral, Daily  sodium chloride, 10 mL, Intravenous, Q12H  sucralfate, 1 g, Oral, 4x Daily AC & at Bedtime  vancomycin, 1,000 mg, Intravenous, Q12H      Current PRN Medications:  •  acetaminophen **OR**  "acetaminophen **OR** acetaminophen  •  butalbital-acetaminophen-caffeine  •  calcium carbonate  •  dextrose  •  dextrose  •  glucagon (human recombinant)  •  hydrOXYzine  •  ipratropium  •  ipratropium-albuterol  •  Morphine  •  nicotine  •  ondansetron **OR** ondansetron  •  oxyCODONE-acetaminophen  •  oxyCODONE-acetaminophen  •  sodium chloride  •  sodium chloride    Review of Systems see HPI.  Unobtainable from patient due to mental status.    Vital Signs:  /81   Pulse 110   Temp 98.7 °F (37.1 °C) (Axillary)   Resp 22   Ht 177.8 cm (70\")   Wt 124 kg (273 lb 9.6 oz)   SpO2 96%   BMI 39.26 kg/m²     Physical Exam  Vital signs - reviewed.  Line/IV site - No erythema, warmth, induration, or tenderness.  Lungs without crackles or wheezes  Right neck IJ without discoloration or induration.  Skin without rash.  Abdomen soft.  Bowel sounds present.  Opens eyes.  Will blink to touching his eyes.  Does not blink to threat.  Operative cranioplasty site without induration or drainage.    Lab Results:  CBC:   Results from last 7 days   Lab Units 04/19/23  0657 04/18/23  0413 04/17/23  0308   WBC 10*3/mm3 13.61* 16.70* 17.15*   HEMOGLOBIN g/dL 10.2* 10.2* 9.7*   HEMATOCRIT % 33.8* 33.4* 31.1*   PLATELETS 10*3/mm3 163 203 227     BMP:  Results from last 7 days   Lab Units 04/23/23  0321 04/22/23  0256 04/21/23  0223 04/19/23  0657 04/18/23  0233 04/17/23  0308 04/16/23  2320   SODIUM mmol/L 140 139 138 138 141 141 140   POTASSIUM mmol/L 4.2 3.8 3.7 4.2 4.6 5.3* 5.3*   CHLORIDE mmol/L 99 102 100 101 103 103 102   CO2 mmol/L 29.0 28.0 28.0 24.0 28.0 26.0 25.0   BUN mg/dL 35* 29* 21 16 15 15 14   CREATININE mg/dL 1.13 1.15 1.04 1.26 1.49* 1.44* 1.38*   GLUCOSE mg/dL 263* 232* 265* 139* 70 165* 201*   CALCIUM mg/dL 8.2* 7.8* 7.9* 7.7* 7.9* 8.0* 7.7*   ALT (SGPT) U/L 26 24 32 60* 106* 159*  --      Culture Results:   Blood Culture   Date Value Ref Range Status   04/19/2023 No growth at 4 days  Preliminary   04/19/2023 " No growth at 4 days  Preliminary     Urine Culture   Date Value Ref Range Status   04/19/2023 No growth  Final     Radiology:    Chest x-ray done today personally reviewed:   IMPRESSION:  Slight advancement of the endotracheal tube, in satisfactory  position, with the tip approximately 5 cm superior to the robert. The  right IJ central line and NG tube appear to be in good position as  before. Persistent, mildly improved central pulmonary edema and vascular  congestion.  This report was finalized on 04/23/2023 11:16 by Dr. Elijah Grover MD.      Additional Studies Reviewed: None    Impression:   1.  Prior infection at cranioplasty site with Serratia-has completed IV antibiotic treatment.  Transition to oral treatment with levofloxacin.  2.  Previous fever-essentially resolved.  Did not feel the low-grade temp to 100 likely significant.  He has completed reasonable course of antibiotic treatment.  Feel he can be followed on levofloxacin alone at present.  3.  Respiratory failure following episode of pulseless electrical activity-he is stable on 40% FiO2.  4.  Mental status-he has been off sedation.  Not responding purposefully as of yet.    Recommendations:   Discontinue vancomycin  Continue levofloxacin  If develops fever, worsening leukocytosis, increasing sputum production etc. will reculture    Edwin Jeffers MD

## 2023-04-23 NOTE — PROGRESS NOTES
[facility] [place] PULMONARY AND CRITICAL CARE PROGRESS NOTE - [facility]    Patient: Elijah Escobar    1955    MR# [id number]    Acct# [id number]  04/23/23   09:22 CDT  Referring Provider: Flaco Portillo, *    Chief Complaint: Mechanically ventilated    Interval history: The patient is resting in bed with endotracheal tube to mechanical ventilator.  D#7 ETT.  Nursing is at bedside. O2 sat 94% on 5 PEEP, 0.40 FiO2.  ABGs reviewed.  Vt in the 700s, MV 10.2. Max temp 100. CXR with increasing volume overload. Continues lasix 40mg IV BID. Will add 40 mg IV x 1 to his scheduled am dose. He had 1350 output in the last 24 hours. He is Net(-) 353 since admit.  RN states he was given a short time of sedation last night otherwise he has been off for close to 48 hours. He has had a few doses of PRN Morphine yesterday. No other aggravating or alleviating factors.     Meds:  aspirin, 81 mg, Oral, Daily  atorvastatin, 20 mg, Oral, Nightly  budesonide, 0.5 mg, Nebulization, BID - RT  Chlorhexidine Gluconate Cloth, 1 application, Topical, Q24H  digoxin, 250 mcg, Oral, Daily  dilTIAZem, 90 mg, Oral, Q8H  furosemide, 40 mg, Intravenous, BID  furosemide, 40 mg, Intravenous, Once  gabapentin, 300 mg, Oral, Q8H  insulin detemir, 25 Units, Subcutaneous, Q12H  insulin lispro, 0-14 Units, Subcutaneous, Q6H  ipratropium, 0.5 mg, Nebulization, 4x Daily - RT  levETIRAcetam, 500 mg, Oral, BID  levoFLOXacin, 500 mg, Oral, Q24H  methylnaltrexone, 12 mg, Subcutaneous, Every Other Day  metoclopramide, 10 mg, Oral, 4x Daily AC & at Bedtime  metoprolol tartrate, 50 mg, Oral, Q12H  multivitamin with minerals, 1 tablet, Oral, Daily  pantoprazole, 40 mg, Intravenous, Q AM  polyethylene glycol, 17 g, Oral, Daily  sennosides-docusate, 1 tablet, Oral, Daily  sodium chloride, 10 mL, Intravenous, Q12H  sucralfate, 1 g, Oral, 4x Daily AC & at Bedtime  vancomycin, 1,000 mg, Intravenous, Q12H      norepinephrine, 0.02-0.3 mcg/kg/min, Last  Rate: Stopped (04/17/23 2005)  propofol, 5-50 mcg/kg/min, Last Rate: 5 mcg/kg/min (04/23/23 0253)  vasopressin, 0.03 Units/min, Last Rate: Stopped (04/16/23 2027)        Ventilator Settings:        Resp Rate (Set): 10     FiO2 (%): 40 %  PEEP/CPAP (cm H2O): 5 cm H20  Minute Ventilation (L/min) (Obs): 11.4 L/min  Resp Rate (Observed) Vent: 19  I:E Ratio (Set): 1:4.45  I:E Ratio (Obs): 1:1.9  PIP Observed (cm H2O): 17 cm H2O     Physical Exam:  Temp:  [97.7 °F (36.5 °C)-100 °F (37.8 °C)] 98.7 °F (37.1 °C)  Heart Rate:  [] 113  Resp:  [13-36] 22  BP: (108-145)/(63-90) 118/71  FiO2 (%):  [40 %] 40 %    Intake/Output Summary (Last 24 hours) at 4/23/2023 0922  Last data filed at 4/23/2023 0740  Gross per 24 hour   Intake 1707.59 ml   Output 1550 ml   Net 157.59 ml     SpO2 Percentage    04/23/23 0700 04/23/23 0730 04/23/23 0800   SpO2: 90% 92% 100%   Body mass index is 39.26 kg/m².   Physical Exam   Constitutional:       Appearance: He is obese. He is ill-appearing.  Intubated and sedated  HENT:      Head: Normocephalic. Right scalp wound, well-healed, no redness warmth or drainage     Nose: Nose normal.      Mouth/Throat: ETT/OG/TF  Eyes:      General:         Right eye: No discharge.         Left eye: No discharge.   Cardiovascular:      Rate and Rhythm: tachycardia   Pulmonary:      Effort: increased work of breathing      Breath sounds: No wheezing.  Diminished in bases  Abdominal:      General: Abdomen is flat.      Palpations: Abdomen is soft.    Musculoskeletal:       Right lower leg: No edema.      Left lower leg: No edema.   Skin:     General: Skin is warm and dry.      Coloration: Skin is not jaundiced or pale.   Neurological:      General: Intubated      Electronically signed by ADITYA Ferrell, 4/23/2023, 09:22 CDT      Physician substantive portion: medical decision making    Result Review    Laboratory Data:  Results from last 7 days   Lab Units 04/19/23  0657 04/18/23  0413 04/17/23  0308  "  WBC 10*3/mm3 13.61* 16.70* 17.15*   HEMOGLOBIN g/dL 10.2* 10.2* 9.7*   PLATELETS 10*3/mm3 163 203 227     Results from last 7 days   Lab Units 04/23/23  0321 04/22/23  0256 04/21/23  0223 04/17/23  0308 04/16/23  2320   SODIUM mmol/L 140 139 138   < > 140   POTASSIUM mmol/L 4.2 3.8 3.7   < > 5.3*   CO2 mmol/L 29.0 28.0 28.0   < > 25.0   BUN mg/dL 35* 29* 21   < > 14   CREATININE mg/dL 1.13 1.15 1.04   < > 1.38*   CRP mg/dL  --   --   --   --  1.39*    < > = values in this interval not displayed.     Results from last 7 days   Lab Units 04/23/23  0440 04/22/23  0355 04/21/23  1412   PH, ARTERIAL pH units 7.465* 7.458* 7.492*   PCO2, ARTERIAL mm Hg 44.6 45.0 41.2   PO2 ART mm Hg 62.0* 167.0* 223.0*   FIO2 % 40 50 70     Microbiology Results (last 10 days)     Procedure Component Value - Date/Time    Chlamydia trachomatis, Neisseria gonorrhoeae, PCR - Swab, Penis [[id number]]  (Normal) Collected: 04/19/23 0936    Lab Status: Final result Specimen: Swab from Penis Updated: 04/19/23 1425     Chlamydia DNA by PCR Not Detected     Neisseria gonorrhoeae by PCR Not Detected    Narrative:      Disclaimer: The Aptima Combo 2 assay is a target amplification nucleic acid probe test that utilizes target capture for the in vitro qualitative detection and differentiation of ribosomal RNA from Chlamydia trachomatis and Neisseria gonorrhoeae to aid in the diagnosis of chlamydial and/or gonococcal urogenital disease.  Cell culture was once considered to be the \"gold standard\" for detection of CT and NG.  Culture is quite specific, but scientific studies have demonstrated that the NAAT DNA probe technologies have higher clinical sensitivities than culture.    Genital Culture - Swab, Penis [[id number]] Collected: 04/19/23 0936    Lab Status: Final result Specimen: Swab from Penis Updated: 04/22/23 0549     Genital Culture No growth at 3 days     Gram Stain Few (2+) WBCs seen      No organisms seen    Urine Culture - Urine, Indwelling " Urethral Catheter [[id number]]  (Normal) Collected: 04/19/23 0307    Lab Status: Final result Specimen: Urine from Indwelling Urethral Catheter Updated: 04/20/23 1207     Urine Culture No growth    Blood Culture - Blood, Hand, Right [[id number]]  (Normal) Collected: 04/19/23 0209    Lab Status: Preliminary result Specimen: Blood from Hand, Right Updated: 04/23/23 0230     Blood Culture No growth at 4 days    Blood Culture - Blood, Hand, Left [[id number]]  (Normal) Collected: 04/19/23 0207    Lab Status: Preliminary result Specimen: Blood from Hand, Left Updated: 04/23/23 0230     Blood Culture No growth at 4 days         Recent films:  EEG    Result Date: 4/21/2023  Reason for referral: 67 y.o.male with altered mental status Technical Summary:  A 19 channel digital EEG was performed using the international 10-20 placement system, including eye leads and EKG leads. Duration: 27 minutes Findings: The patient is intubated and unresponsive.  Head movement artifact is noted diffuse low amplitude 2-5 Hz delta and theta activity is seen over the left hemisphere.  Higher amplitude delta and theta activity are seen over the right frontotemporal head leads, likely representing a breach effect due to prior craniotomy.  No definitive epileptiform activity or electrographic seizures are seen.  Photic stimulation and hyperventilation are not performed.  There is no change in the background to mechanical stimulation. Video: Available Technical quality: Superior SUMMARY: Moderate generalized slow and suppressed Breach rhythm, right frontotemporal (due to prior craniotomy) No epileptiform activity or ongoing seizures are seen     Impression: Diffuse cerebral dysfunction of moderate degree but nonspecific No ongoing seizures are seen This report is transcribed using the Dragon dictation system.      XR Chest 1 View    Result Date: 4/23/2023  EXAMINATION: XR CHEST 1 VW- 4/23/2023 8:15 AM CDT  HISTORY: Intubated and sedated;  S06.4X0A-Epidural hemorrhage without loss of consciousness, initial encounter; Z74.09-Other reduced mobility; Z98.890-Other specified postprocedural states; T81.42XA-Infection following a procedure, deep incisional surgical site, initial encounter; D32.9-Benign neoplasm of meninges, unspecified; E11.65-Type 2 diabetes mellitus with hyperglycemia; Z79.4-Long term (current) u  REPORT: A frontal view the chest was obtained.  COMPARISON: Chest x-ray 04/22/2023 0311 hours.  The patient remains intubated, the tip of the endotracheal tube is approximately 7.1 cm superior to the robert, and could be advanced up to 3 cm. A nasogastric tube is present, but reaches the stomach but the tip is not included in the field-of-view. There is a right internal jugular central line, the tip projects over the expected location of the cavoatrial junction, unchanged. There is diffuse atelectasis and interval increase in interstitial opacities centrally with vascular distention. Heart size appears to be normal. The osseous structures are unremarkable. No pneumothorax is identified.      Impression: 1. Increasing interstitial opacities and vascular congestion likely related to mild pulmonary edema and volume overload. No lung consolidation is identified and there is no pneumothorax. Stable position of multiple life-support lines. This report was finalized on 04/23/2023 08:18 by Dr. Elijah Grover MD.    XR Chest 1 View    Result Date: 4/22/2023  EXAMINATION: XR CHEST 1 VW- 4/22/2023 8:06 AM CDT  HISTORY: Intubated and sedated; S06.4X0A-Epidural hemorrhage without loss of consciousness, initial encounter; Z74.09-Other reduced mobility; Z98.890-Other specified postprocedural states; T81.42XA-Infection following a procedure, deep incisional surgical site, initial encounter; D32.9-Benign neoplasm of meninges, unspecified; E11.65-Type 2 diabetes mellitus with hyperglycemia; Z79.4-Long term (current) u  REPORT: A frontal view the chest was  obtained.  COMPARISON: Chest x-ray 04/21/2023 0313 hours.  The patient remains intubated, the tip of the endotracheal tube is approximately 7 cm superior to the robert and appears slightly retracted compared with yesterday. Consider advancing the tube up to 3 cm. The right internal jugular central line tip projects over the cavoatrial junction as before, satisfactory. A nasogastric tube is present and reaches the stomach. There is extensive volume loss in the lung bases and central vascular congestion is present, with borderline cardiomegaly. No pneumothorax or pleural effusion.      Impression: Slight retraction of the endotracheal tube compared with yesterday, consider advancing the tube to 3 cm. The other life-support lines appear in satisfactory position as before. Continued hypoaeration of lungs with no focal consolidation. Vascular congestion persists, correlate clinically for mild volume overload. This report was finalized on 04/22/2023 08:08 by Dr. Elijah Grover MD.    XR Abdomen KUB    Result Date: 4/21/2023  EXAMINATION: XR ABDOMEN KUB- 4/21/2023 3:25 PM CDT  HISTORY: OG placement; S06.4X0A-Epidural hemorrhage without loss of consciousness, initial encounter; Z74.09-Other reduced mobility; Z98.890-Other specified postprocedural states; T81.42XA-Infection following a procedure, deep incisional surgical site, initial encounter; D32.9-Benign neoplasm of meninges, unspecified; E11.65-Type 2 diabetes mellitus with hyperglycemia; Z79.4-Long term (current) use of ins  REPORT: Supine imaging of the abdomen was performed.  COMPARISON: KUB 04/17/2023.  The tip of the NG tube projects over the gastric fundus, in satisfactory position. There is volume loss in the lung bases. The visualized bowel gas pattern appears unremarkable. The patient is intubated and a right internal jugular central line is present, good position. The ET tube appears to be in satisfactory position.      Impression: Satisfactory position of  the NG tube, as well as the right IJ central line and endotracheal tube. Mild volume loss in the lung bases. This report was finalized on 04/21/2023 15:26 by Dr. Elijah Grover MD.     Personal review of imaging: CXR shows ett ok, increased infiltrate/edema    Pulmonary Assessment:  1. Acute resp failure with hypoxia  2. Metabolic encephalopathy  3. Pulmonary edema  4. Hx craniotomy    Recommend/plan:   · increase diuretic this am  · Continue vent without change, not ready for spontaneous breathing trials    · Nutrition  · Follow up cxr and abg in am  · D/c vasopressin, norepinephrine    This visit was performed by both a physician and an [facility] RN.  I personally evaluated and examined the patient.  I performed all aspects of the medical decision making as documented.  Electronically signed by Ajay Root MD, 4/23/2023, 09:55 CDT

## 2023-04-23 NOTE — PROGRESS NOTES
[facility] Medicine Services  INPATIENT PROGRESS NOTE    Patient Name: Elijah Escobar  Date of Admission: 3/31/2023  Today's Date: 04/23/23  Length of Stay: 23  Primary Care Physician: Brianna Martinez APRN    Subjective   Chief Complaint: Patient remains unresponsive on ventilator.  HPI   No new change overnight.  Sugars continue to run in the 200 range on tube feedings.  Home insulin 20-30 units of Levemir q12h.  We are currently at 20 units every 12 hours.  Tube feed at 55 cc an hour          Review of Systems   All pertinent negatives and positives are as above. All other systems have been reviewed and are negative unless otherwise stated.     Objective    Temp:  [97.7 °F (36.5 °C)-100 °F (37.8 °C)] 99.1 °F (37.3 °C)  Heart Rate:  [] 113  Resp:  [13-36] 18  BP: (108-145)/(60-90) 115/76  FiO2 (%):  [40 %] 40 %  Physical Exam  Vitals and nursing note reviewed.   Constitutional:       Comments: Remains unresponsive except for withdrawal lower extremities to painful stimuli.   HENT:      Right Ear: External ear normal.      Left Ear: External ear normal.      Nose: Nose normal.      Mouth/Throat:      Mouth: Mucous membranes are dry.   Eyes:      Pupils: Pupils are equal, round, and reactive to light.   Cardiovascular:      Rate and Rhythm: Tachycardia present.      Pulses: Normal pulses.   Pulmonary:      Comments: Remains on ventilator.  SaO2 currently is 92%.  Abdominal:      General: Bowel sounds are normal.   Musculoskeletal:      Right lower leg: Edema present.      Left lower leg: Edema present.   Skin:     Comments: Skin is cool and dry   Neurological:      Comments: Unresponsive   Psychiatric:      Comments: Unresponsive           Results Review:  I have reviewed the labs, radiology results, and diagnostic studies.    Laboratory Data:   Results from last 7 days   Lab Units 04/19/23  0657 04/18/23  0413 04/17/23  0308   WBC 10*3/mm3 13.61* 16.70* 17.15*    HEMOGLOBIN g/dL 10.2* 10.2* 9.7*   HEMATOCRIT % 33.8* 33.4* 31.1*   PLATELETS 10*3/mm3 163 203 227        Results from last 7 days   Lab Units 04/23/23  0321 04/22/23  0256 04/21/23  0223   SODIUM mmol/L 140 139 138   POTASSIUM mmol/L 4.2 3.8 3.7   CHLORIDE mmol/L 99 102 100   CO2 mmol/L 29.0 28.0 28.0   BUN mg/dL 35* 29* 21   CREATININE mg/dL 1.13 1.15 1.04   CALCIUM mg/dL 8.2* 7.8* 7.9*   BILIRUBIN mg/dL 0.3 0.4 0.5   ALK PHOS U/L 109 83 93   ALT (SGPT) U/L 26 24 32   AST (SGOT) U/L 20 30 25   GLUCOSE mg/dL 263* 232* 265*       Culture Data:   Blood Culture   Date Value Ref Range Status   04/19/2023 No growth at 4 days  Preliminary   04/19/2023 No growth at 4 days  Preliminary     Urine Culture   Date Value Ref Range Status   04/19/2023 No growth  Final       Radiology Data:   Imaging Results (Last 24 Hours)     Procedure Component Value Units Date/Time    XR Chest 1 View [[id number]] Resulted: 04/23/23 0414     Updated: 04/23/23 0421    XR Chest 1 View [[id number]] Collected: 04/22/23 0806     Updated: 04/22/23 0812    Narrative:      EXAMINATION: XR CHEST 1 VW- 4/22/2023 8:06 AM CDT     HISTORY: Intubated and sedated; S06.4X0A-Epidural hemorrhage without  loss of consciousness, initial encounter; Z74.09-Other reduced mobility;  Z98.890-Other specified postprocedural states; T81.42XA-Infection  following a procedure, deep incisional surgical site, initial encounter;  D32.9-Benign neoplasm of meninges, unspecified; E11.65-Type 2 diabetes  mellitus with hyperglycemia; Z79.4-Long term (current) u     REPORT: A frontal view the chest was obtained.     COMPARISON: Chest x-ray 04/21/2023 0313 hours.     The patient remains intubated, the tip of the endotracheal tube is  approximately 7 cm superior to the robert and appears slightly retracted  compared with yesterday. Consider advancing the tube up to 3 cm. The  right internal jugular central line tip projects over the cavoatrial  junction as before, satisfactory. A  nasogastric tube is present and  reaches the stomach. There is extensive volume loss in the lung bases  and central vascular congestion is present, with borderline  cardiomegaly. No pneumothorax or pleural effusion.       Impression:      Slight retraction of the endotracheal tube compared with  yesterday, consider advancing the tube to 3 cm. The other life-support  lines appear in satisfactory position as before. Continued hypoaeration  of lungs with no focal consolidation. Vascular congestion persists,  correlate clinically for mild volume overload.  This report was finalized on 04/22/2023 08:08 by Dr. Elijah Grover MD.          I have reviewed the patient's current medications.     Assessment/Plan   Assessment  Active Hospital Problems    Diagnosis    • **Swelling of scalp    • Elevated liver function tests    • COPD (chronic obstructive pulmonary disease)    • Obesity (BMI 30-39.9)    • Tobacco abuse, in remission    • Acute systolic heart failure    • ADDIE (obstructive sleep apnea)    • Type 2 myocardial infarction due to arrhythmia    • Acute pulmonary edema due to A-fib RVR    • Acute respiratory failure with hypoxia    • Intracranial epidural hematoma    • Post-operative infection    • Paroxysmal atrial fibrillation with rapid ventricular response    • Type 2 diabetes mellitus with hyperglycemia and peripheral neuropathy, with long-term current use of insulin (HCC)    • Coronary artery disease    • Meningioma s/p craniotomy        Treatment Plan  Increase Levemir insulin to 25 units twice daily  Monitor blood sugar  Increase Lasix dose     Medical Decision Making  Number and Complexity of problems:   1) exacerbation of respiratory failure with hypoxia secondary to pulmonary edema, acute, high complexity  2) PAF with RVR, acute, moderate complexity  3) meningioma versus intracranial infection, acute, high complexity  4) type 2 diabetes, chronic, moderate complexity  5) elevated liver enzymes acute moderate  complexity  6) elevated WBC acute moderate complexity  Differential Diagnosis: Pulmonary embolus, LV dysfunction     Conditions and Status        Condition is unchanged.     MDM Data  External documents reviewed: No new data  Cardiac tracing (EKG, telemetry) interpretation: A-fib rate controlled  Radiology interpretation: Chest x-ray has increased interstitial markings consistent with fluid overload.  Labs reviewed:  reviewed  Any tests that were considered but not ordered: None     Decision rules/scores evaluated (example [id number], Wells, etc): [id number] score of 5 with a 7.2% risk of stroke annually     Discussed with:  nursing      Care Planning  Shared decision making: Patient is sedated  Code status and discussions: full     Disposition  Social Determinants of Health that impact treatment or disposition: none  I expect the patient to be discharged by the primary service. 1  Electronically signed by Susanne Milligan, 04/23/23, 07:33 CDT.

## 2023-04-23 NOTE — PLAN OF CARE
"Goal Outcome Evaluation:  Plan of Care Reviewed With: patient        Progress: no change  Outcome Evaluation: Sedation remains off. Had a more pronounced reaction to painful stimulus appeared to mouth \"ouch\". Tolerating tube feeding. Large BM. Turn Q2  "

## 2023-04-23 NOTE — PLAN OF CARE
Goal Outcome Evaluation:     Pt became desynchronized with the vent started prop back at 5. Patient calmed down and became less tense after starting. Small BM. Urine output adequate but not great. Periorbital edema still present, Pupils reactive, and patient still only withdrawing bilaterally in the lower extremities. Safety maintained

## 2023-04-23 NOTE — PLAN OF CARE
Goal Outcome Evaluation:  Plan of Care Reviewed With: patient        Progress: improving  Outcome Evaluation: PT tx completed. Pt cont sedated on vent. Not following commands. Moves head spontaneously. PROM performed to all extremities. Will cont to follow.

## 2023-04-23 NOTE — PROGRESS NOTES
Neurosurgery Daily Progress Note    HPI:  Elijah Escobar is a 67 y.o. male with a significant medical history of hypertension, diabetes, hyperlipidemia, seizures, craniotomy for tumor (6/16/2022), craniotomy for washout (7/1/2022), craniectomy (10/4/2022), coronary artery disease, diabetes, erectile dysfunction, GERD, hypertension, hyperlipidemia, tobacco abuse, and obesity.  He presents today with a complaint of a 4-5 days onset of progressively worsening right frontal, temporal, and periorbital edema and associated symptoms to include, but not limited to generalized fatigue, chills, dyspnea, intermittent nausea without vomiting, and states he's felt feverish.  Physical exam findings of neurologically intact with right frontal, temporal, and periorbital swelling.  WBC elevated at 15.  3 to an CRP elevated at 14.75.  Imaging pending.    Assessment:   Past Medical History:   Diagnosis Date   • Brain tumor    • Coronary artery disease    • COVID-19 vaccine series completed     MODERNA X 3; LAST DOSE 3/2022   • Diabetes    • Erectile dysfunction    • GERD (gastroesophageal reflux disease)    • Hypercholesteremia    • Hypertension    • Seizure      Active Hospital Problems    Diagnosis    • **Swelling of scalp    • Elevated liver function tests    • COPD (chronic obstructive pulmonary disease)    • Obesity (BMI 30-39.9)    • Tobacco abuse, in remission    • Acute systolic heart failure    • ADDIE (obstructive sleep apnea)    • Type 2 myocardial infarction due to arrhythmia    • Acute pulmonary edema due to A-fib RVR    • Acute respiratory failure with hypoxia    • Intracranial epidural hematoma    • Post-operative infection    • Paroxysmal atrial fibrillation with rapid ventricular response    • Type 2 diabetes mellitus with hyperglycemia and peripheral neuropathy, with long-term current use of insulin (HCC)    • Coronary artery disease    • Meningioma s/p craniotomy      Plan:   Neuro:   Slight improvement.  Now grimace  to pain.    Infected cranial flap      POD #18 (4/4/2023)  craniectomy and washout    Postop code CT and MRI stable. No acute abnormalities.    Flap tapped I&D cultures 4+ gram-negative bacilli    Heavy 4+ Serratia marcescens     11 Days Post-Op (4/13/2023) evacuation of scalp hematoma    JUAN removed     Continue neuro exams per policy.  Call for decline  EEG shows no epileptiform activity or ongoing seizures  Anticipate trach and PEG early next week.    CV: Code for PEA yesterday afternoon, 4/16/2023.  Required epinephrine and bicarb   AFib/flutter, rate controlled.   Hold all anticoagulation for 2 weeks.   No evidence of DVT or PE per imaging   Appreciate cardiology  Pulm: Remains intubated on low-dose propofol.     Atelectasis versus pneumonia left lung      :  Voiding per Jeong catheter.  Trace leuks.  TNTC WBC  FEN: Currently NPO  Endocrine: Glucose improving.  Continue SSI  GI: SHERIF.  +BM  ID:  Infected cranial flap  CSF: no growth to date   PNA.  Resolved   UTI.  Resolved   Continue antibiotics.  Currently on Levaquin and vancomycin  Heme:  DVT prophylaxis with SCDs.  Hold all anticoagulation for 2 weeks.  Pain: No complaints at present  Dispo: Pending extubation     Chief complaint:   4-5 days onset of progressively worsening right frontal, temporal, and periorbital edema and associated symptoms to include, but not limited to generalized fatigue, chills, dyspnea, intermittent nausea without vomiting, and states he's felt feverish.    HPI  Subjective  Confusion and slurred speech    Temp:  [97.7 °F (36.5 °C)-100 °F (37.8 °C)] 98.7 °F (37.1 °C)  Heart Rate:  [] 113  Resp:  [13-36] 22  BP: (108-145)/(63-90) 118/71  FiO2 (%):  [40 %] 40 %    Output by Drain (mL) 04/22/23 0701 - 04/22/23 1900 04/22/23 1901 - 04/23/23 0700 04/23/23 0701 - 04/23/23 0935 Range Total   Requested LDAs do not have output data documented.     Objective:  Vital signs: (most recent): Blood pressure 118/71, pulse 113, temperature  "98.7 °F (37.1 °C), temperature source Axillary, resp. rate 22, height 177.8 cm (70\"), weight 124 kg (273 lb 9.6 oz), SpO2 100 %.      Neurologic Exam     Mental Status      With sedation paused gag and cough intact, pupillary response intact, does not follow commands.    Grimace to pain.       Cranial Nerves     CN III, IV, VI   Pupils are equal, round, and reactive to light.    CN VII   Right facial weakness: none  Left facial weakness: none    CN IX, X   Right gag reflex: normal  Left gag reflex: normal    Motor Exam   Withdraws to painful stimuli to lower extremities     Drains: * No LDAs found *    Imaging Results (Last 24 Hours)     Procedure Component Value Units Date/Time    XR Chest 1 View [[id number]] Collected: 04/23/23 0815     Updated: 04/23/23 0821    Narrative:      EXAMINATION: XR CHEST 1 VW- 4/23/2023 8:15 AM CDT     HISTORY: Intubated and sedated; S06.4X0A-Epidural hemorrhage without  loss of consciousness, initial encounter; Z74.09-Other reduced mobility;  Z98.890-Other specified postprocedural states; T81.42XA-Infection  following a procedure, deep incisional surgical site, initial encounter;  D32.9-Benign neoplasm of meninges, unspecified; E11.65-Type 2 diabetes  mellitus with hyperglycemia; Z79.4-Long term (current) u     REPORT: A frontal view the chest was obtained.     COMPARISON: Chest x-ray 04/22/2023 0311 hours.     The patient remains intubated, the tip of the endotracheal tube is  approximately 7.1 cm superior to the robert, and could be advanced up to  3 cm. A nasogastric tube is present, but reaches the stomach but the tip  is not included in the field-of-view. There is a right internal jugular  central line, the tip projects over the expected location of the  cavoatrial junction, unchanged. There is diffuse atelectasis and  interval increase in interstitial opacities centrally with vascular  distention. Heart size appears to be normal. The osseous structures are  unremarkable. No " pneumothorax is identified.       Impression:      1. Increasing interstitial opacities and vascular congestion likely  related to mild pulmonary edema and volume overload. No lung  consolidation is identified and there is no pneumothorax. Stable  position of multiple life-support lines.  This report was finalized on 04/23/2023 08:18 by Dr. Elijah Grover MD.        Lab Results (last 24 hours)     Procedure Component Value Units Date/Time    POC Glucose Once [[id number]]  (Abnormal) Collected: 04/23/23 0614    Specimen: Blood Updated: 04/23/23 0626     Glucose 283 mg/dL      Comment: : [id number] Chandni Hardin ID: [id number]       Blood Gas, Arterial - [[id number]]  (Abnormal) Collected: 04/23/23 0440    Specimen: Arterial Blood Updated: 04/23/23 0441     Site Right Radial     Marek's Test Positive     pH, Arterial 7.465 pH units      Comment: 83 Value above reference range        pCO2, Arterial 44.6 mm Hg      pO2, Arterial 62.0 mm Hg      Comment: 84 Value below reference range        HCO3, Arterial 32.1 mmol/L      Comment: 83 Value above reference range        Base Excess, Arterial 7.5 mmol/L      Comment: 83 Value above reference range        O2 Saturation, Arterial 91.8 %      Comment: 84 Value below reference range        Temperature 37.0 C      Barometric Pressure for Blood Gas 753 mmHg      Modality Ventilator     FIO2 40 %      Ventilator Mode PC     Set [facility] Resp Rate 10.0     PEEP 5.0     PIP 12 cmH2O      Comment: Meter: [id number]     :  [id number]        Collected by [id number]     pCO2, Temperature Corrected 44.6 mm Hg      pH, Temp Corrected 7.465 pH Units      pO2, Temperature Corrected 62.0 mm Hg     Comprehensive Metabolic Panel [[id number]]  (Abnormal) Collected: 04/23/23 0321    Specimen: Blood Updated: 04/23/23 0412     Glucose 263 mg/dL      BUN 35 mg/dL      Creatinine 1.13 mg/dL      Sodium 140 mmol/L      Potassium 4.2 mmol/L      Chloride 99 mmol/L      CO2 29.0 mmol/L       Calcium 8.2 mg/dL      Total Protein 6.1 g/dL      Albumin 3.0 g/dL      ALT (SGPT) 26 U/L      AST (SGOT) 20 U/L      Alkaline Phosphatase 109 U/L      Total Bilirubin 0.3 mg/dL      Globulin 3.1 gm/dL      A/G Ratio 1.0 g/dL      BUN/Creatinine Ratio 31.0     Anion Gap 12.0 mmol/L      eGFR 71.2 mL/min/1.73     Narrative:      GFR Normal >60  Chronic Kidney Disease <60  Kidney Failure <15      Blood Culture - Blood, Hand, Right [[id number]]  (Normal) Collected: 04/19/23 0209    Specimen: Blood from Hand, Right Updated: 04/23/23 0230     Blood Culture No growth at 4 days    Blood Culture - Blood, Hand, Left [[id number]]  (Normal) Collected: 04/19/23 0207    Specimen: Blood from Hand, Left Updated: 04/23/23 0230     Blood Culture No growth at 4 days    POC Glucose Once [[id number]]  (Abnormal) Collected: 04/22/23 2359    Specimen: Blood Updated: 04/23/23 0010     Glucose 211 mg/dL      Comment: : [id number] Chandni Matadaniel ID: [id number]       POC Glucose Once [[id number]]  (Abnormal) Collected: 04/22/23 2120    Specimen: Blood Updated: 04/22/23 2132     Glucose 177 mg/dL      Comment: : [id number] Chandni Hardin ID: [id number]       POC Glucose Once [[id number]]  (Abnormal) Collected: 04/22/23 1812    Specimen: Blood Updated: 04/22/23 1828     Glucose 187 mg/dL      Comment: : [id number] Phoenix Skinner ID: [id number]       Vancomycin, Trough [[id number]]  (Abnormal) Collected: 04/22/23 1307    Specimen: Blood Updated: 04/22/23 1332     Vancomycin Trough 22.20 mcg/mL     POC Glucose Once [[id number]]  (Abnormal) Collected: 04/22/23 1202    Specimen: Blood Updated: 04/22/23 1214     Glucose 316 mg/dL      Comment: : [id number] Phoenix Skinner ID: [id number]           Flaco Portillo MD

## 2023-04-24 NOTE — H&P (VIEW-ONLY)
[facility]   Consult Note    Patient Name: Elijah Escobar  : 1955  MRN: [id number]  Primary Care Physician:  Brianna Martinez APRN  Referring Physician: Flaco Portillo, *  Date of admission: 3/31/2023    Consults  Subjective   Subjective     Reason for Consult/ Chief Complaint: difficult airway; prolonged intubation     History of Present Illness  Elijah Escobar is a 67 y.o. male with an extensive medical history including hypertension, diabetes, hyperlipidemia, seizures, and coronary artery disease.   Patient underwent craniotomy 2022 for tumor, craniotomy 2022 for wash out and craniectomy 10/2022   Patient presented to [facility] with complaints of right frontal, temporal and periorbital edema and was intubated 2023 following period of agitation and decreased oxygen saturation   Patient is currently intubated and sedated and ent consultation was made regarding long term airway management           Review of Systems   Unable to obtain patient intubated and sedated        Personal History     Past Medical History:   Diagnosis Date   • Brain tumor    • Coronary artery disease    • COVID-19 vaccine series completed     MODERNA X 3; LAST DOSE 3/2022   • Diabetes    • Erectile dysfunction    • GERD (gastroesophageal reflux disease)    • Hypercholesteremia    • Hypertension    • Seizure        Past Surgical History:   Procedure Laterality Date   • BALLOON ANGIOPLASTY, ARTERY Right    • COLONOSCOPY     • CRANIECTOMY Right 2022    Procedure: CRANIECTOMY WITH INCISIONAL WASHOUT;  Surgeon: Felipe Banerjee MD;  Location: [facility] OR;  Service: Neurosurgery;  Laterality: Right;   • CRANIOPLASTY Right 10/4/2022    Procedure: CRANIOPLASTY right with Lumbar drain;  Surgeon: Flaco Portillo MD;  Location: [facility] OR;  Service: Neurosurgery;  Laterality: Right;   • CRANIOPLASTY N/A 2023    Procedure: CRANIOPLASTY, removal, right, horseshoe;  Surgeon: Flaco Portillo MD;  Location:   [facility] OR;  Service: Neurosurgery;  Laterality: N/A;   • CRANIOTOMY Right 4/12/2023    Procedure: CRANIOTOMY; evacuation of right hematoma;  Surgeon: Flaco Portillo MD;  Location:  [facility] OR;  Service: Neurosurgery;  Laterality: Right;   • CRANIOTOMY FOR TUMOR Right 6/16/2022    Procedure: CRANIOTOMY FOR TUMOR STERIOTACTIC WITH BRAIN LAB, right;  Surgeon: Flaco Portillo MD;  Location:  [facility] OR;  Service: Neurosurgery;  Laterality: Right;       Family History: His family history includes Cancer in his mother; Heart disease in his father.     Social History: He  reports that he quit smoking about 5 weeks ago. His smoking use included cigarettes. He has a 3.75 pack-year smoking history. He has never used smokeless tobacco. He reports that he does not drink alcohol and does not use drugs.    Home Medications:  HYDROcodone-acetaminophen, acetaminophen, amLODIPine, aspirin, atorvastatin, gabapentin, insulin detemir, levETIRAcetam, metoprolol tartrate, omeprazole, and sotalol    Allergies:  He has No Known Allergies.    Objective    Objective     Vitals:    Temp:  [98 °F (36.7 °C)-99.3 °F (37.4 °C)] 98.3 °F (36.8 °C)  Heart Rate:  [] 83  Resp:  [13-23] 19  BP: (113-153)/(62-98) 118/73  FiO2 (%):  [30 %-40 %] 30 %  Output by Drain (mL) 04/23/23 0701 - 04/23/23 1900 04/23/23 1901 - 04/24/23 0700 04/24/23 0701 - 04/24/23 1315 Range Total   Urethral Catheter 1150 [phone]       Physical Exam  Constitutional:       Appearance: He is obese.      Comments: Intubated and sedated  No family at bedside      HENT:      Mouth/Throat:      Mouth: Mucous membranes are dry.      Comments: ett noted     Neck:      Comments: Thick short neck           Result Review    Result Review:  I have personally reviewed the results from the time of this admission to 4/24/2023 13:15 CDT and agree with these findings:  []  Laboratory list / accordion  []  Microbiology  []  Radiology  []  EKG/Telemetry   []   Cardiology/Vascular   []  Pathology  []  Old records  []  Other:  Most notable findings include:       Assessment & Plan   Assessment / Plan     Brief Patient Summary:  Elijah Escobar is a 67 y.o. male who has an extensive medical history and was intubated 4/16 due to diminished oxygenation as well as agitation   ent consultation was made for future airway management         Active Hospital Problems:  Active Hospital Problems    Diagnosis    • **Swelling of scalp    • Elevated liver function tests    • COPD (chronic obstructive pulmonary disease)    • Obesity (BMI 30-39.9)    • Tobacco abuse, in remission    • Acute systolic heart failure    • ADDIE (obstructive sleep apnea)    • Type 2 myocardial infarction due to arrhythmia    • Acute pulmonary edema due to A-fib RVR    • Acute respiratory failure with hypoxia    • Intracranial epidural hematoma    • Post-operative infection    • Paroxysmal atrial fibrillation with rapid ventricular response    • Type 2 diabetes mellitus with hyperglycemia and peripheral neuropathy, with long-term current use of insulin (HCC)    • Coronary artery disease    • Meningioma s/p craniotomy        Plan:   Will continue to follow and discuss for possibility of tracheostomy placement     Sade Lisa, APRN

## 2023-04-24 NOTE — PROGRESS NOTES
Spontaneous breathing trail completed. Patient placed back in PC mode after becoming agitated and would not calm down. Unable to do weaning perimeters.

## 2023-04-24 NOTE — CASE MANAGEMENT/SOCIAL WORK
Continued Stay Note   [place]     Patient Name: Elijah Escobar  MRN: [id number]  Today's Date: 4/24/2023    Admit Date: 3/31/2023    Plan: LTAC?   Discharge Plan     Row Name 04/24/23 1654       Plan    Plan Comments Per insurance guidelines pt will need trach before precert is recommended again.  There will need to be clear documentation of 3 failed weaning attempts.  Weaning attempts can be before trach is placed, but will need 3 failed weaning attempts documented.               Discharge Codes    No documentation.                     JOO Quinteros

## 2023-04-24 NOTE — PROGRESS NOTES
[facility] Medicine Services  INPATIENT PROGRESS NOTE    Patient Name: Elijah Escobar  Date of Admission: 3/31/2023  Today's Date: 04/24/23  Length of Stay: 24  Primary Care Physician: Brianna Martinez APRN    Subjective   Chief Complaint: Consultation for medical management  HPI     The patient remains ventilated and sedated.  Ventilator settings are 5 of PEEP with FiO2 0.30.  Glucose continues to be in the 200 range with current tube feedings.  I will ask nutrition to be consulted regarding changing tube feedings noted to improve glycemic control.    Review of Systems   All pertinent negatives and positives are as above. All other systems have been reviewed and are negative unless otherwise stated.     Objective    Temp:  [98 °F (36.7 °C)-99.3 °F (37.4 °C)] 98.1 °F (36.7 °C)  Heart Rate:  [] 76  Resp:  [13-21] 18  BP: (112-153)/(62-98) 151/98  FiO2 (%):  [30 %-40 %] 30 %  Physical Exam  Constitutional:       General: He is sleeping.      Appearance: He is ill-appearing.      Interventions: He is sedated and intubated.      Comments: Unable to follow commands.  Does not respond to verbal stimulus.   HENT:      Head: Normocephalic.      Comments: Postop incision healing well.     Right Ear: External ear normal.      Left Ear: External ear normal.      Mouth/Throat:      Mouth: Mucous membranes are dry.      Pharynx: Oropharynx is clear.      Comments: ET tube and OG tube present and secured appropriately.  Eyes:      General: No scleral icterus.     Conjunctiva/sclera: Conjunctivae normal.   Cardiovascular:      Rate and Rhythm: Normal rate. Rhythm irregularly irregular.      Pulses: Normal pulses.      Heart sounds: Normal heart sounds. No murmur heard.  Pulmonary:      Effort: He is intubated.      Breath sounds: Decreased breath sounds present.   Abdominal:      General: Bowel sounds are normal.      Palpations: Abdomen is soft. There is no mass.   Musculoskeletal:       Right lower leg: No edema.      Left lower leg: No edema.   Skin:     General: Skin is warm and dry.   Neurological:      Comments: Does not follow commands.       Results Review:  I have reviewed the labs, radiology results, and diagnostic studies.    Laboratory Data:   Results from last 7 days   Lab Units 04/23/23  2354 04/19/23  0657 04/18/23  0413   WBC 10*3/mm3 10.38 13.61* 16.70*   HEMOGLOBIN g/dL 10.0* 10.2* 10.2*   HEMATOCRIT % 33.1* 33.8* 33.4*   PLATELETS 10*3/mm3 245 163 203        Results from last 7 days   Lab Units 04/24/23  0225 04/23/23  0321 04/22/23  0256   SODIUM mmol/L 139 140 139   POTASSIUM mmol/L 3.8 4.2 3.8   CHLORIDE mmol/L 99 99 102   CO2 mmol/L 30.0* 29.0 28.0   BUN mg/dL 33* 35* 29*   CREATININE mg/dL 1.11 1.13 1.15   CALCIUM mg/dL 8.3* 8.2* 7.8*   BILIRUBIN mg/dL 0.3 0.3 0.4   ALK PHOS U/L 106 109 83   ALT (SGPT) U/L 20 26 24   AST (SGOT) U/L 15 20 30   GLUCOSE mg/dL 231* 263* 232*       Culture Data:   Blood Culture   Date Value Ref Range Status   04/19/2023 No growth at 5 days  Final   04/19/2023 No growth at 5 days  Final     Urine Culture   Date Value Ref Range Status   04/19/2023 No growth  Final       Radiology Data:   Imaging Results (Last 24 Hours)     Procedure Component Value Units Date/Time    XR Chest 1 View [[id number]] Collected: 04/24/23 0745     Updated: 04/24/23 0754    Narrative:      EXAMINATION: XR CHEST 1 VW- 4/24/2023 7:45 AM CDT     HISTORY: Intubated and sedated; S06.4X0A-Epidural hemorrhage without  loss of consciousness, initial encounter; Z74.09-Other reduced mobility;  Z98.890-Other specified postprocedural states; T81.42XA-Infection  following a procedure, deep incisional surgical site, initial encounter;  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     COMPARISON: Chest x-ray 4/23/2020 1044 hours.     Endotracheal tube, NG tube and right internal  jugular central line  appear in satisfactory position as before. There is volume loss in the  lung bases, with mild central vascular congestion, and probable volume  overload, which appears improved. There are persistent patchy subhilar  airspace opacities greater on the right extending into the right lateral  base, without lobar consolidation. No pneumothorax or pleural effusion  is identified.       Impression:      1. Stable satisfactory position of multiple life-support lines.  2. Mild improvement in pulmonary edema and volume overload.  This report was finalized on 04/24/2023 07:51 by Dr. Elijah Grover MD.    XR Chest 1 View [[id number]] Collected: 04/23/23 1113     Updated: 04/23/23 1119    Narrative:      EXAMINATION: XR CHEST 1 VW- 4/23/2023 11:13 AM CDT     HISTORY: advanced ETT; S06.4X0A-Epidural hemorrhage without loss of  consciousness, initial encounter; Z74.09-Other reduced mobility;  Z98.890-Other specified postprocedural states; T81.42XA-Infection  following a procedure, deep incisional surgical site, initial encounter;  D32.9-Benign neoplasm of meninges, unspecified; E11.65-Type 2 diabetes  mellitus with hyperglycemia; Z79.4-Long term (current) use of ins     REPORT: A frontal view of the chest was obtained.     COMPARISON: Chest x-ray 04/23/2023 0320 hours.     The endotracheal tube has been advanced slightly, tip projects  approximately 5 cm superior to the robert in satisfactory position. The  right internal jugular central line appears in good position as before.  There is a nasogastric tube, the tube reaches the stomach but the tip is  not included in the field-of-view. The lungs are hypoaerated, there is  central vascular congestion, with probable interstitial edema and volume  overload. Pneumonia is not excluded but felt less likely. Heart size  appears normal. The upper abdomen is unremarkable.       Impression:      Slight advancement of the endotracheal tube, in satisfactory  position,  with the tip approximately 5 cm superior to the robert. The  right IJ central line and NG tube appear to be in good position as  before. Persistent, mildly improved central pulmonary edema and vascular  congestion.  This report was finalized on 04/23/2023 11:16 by Dr. Elijah Grover MD.          I have reviewed the patient's current medications.     Assessment/Plan   Assessment  Active Hospital Problems    Diagnosis    • **Swelling of scalp    • Elevated liver function tests    • COPD (chronic obstructive pulmonary disease)    • Obesity (BMI 30-39.9)    • Tobacco abuse, in remission    • Acute systolic heart failure    • ADDIE (obstructive sleep apnea)    • Type 2 myocardial infarction due to arrhythmia    • Acute pulmonary edema due to A-fib RVR    • Acute respiratory failure with hypoxia    • Intracranial epidural hematoma    • Post-operative infection    • Paroxysmal atrial fibrillation with rapid ventricular response    • Type 2 diabetes mellitus with hyperglycemia and peripheral neuropathy, with long-term current use of insulin (HCC)    • Coronary artery disease    • Meningioma s/p craniotomy        Treatment Plan  Continue long-acting insulin  We will ask nutrition to reevaluate tube feedings regarding persistent hyperglycemia  Ventilator/critical care management per pulmonary  Spontaneous breathing trials today per pulmonology.  Consults placed to ENT and general surgery for trach and PEG placement respectively  Will likely need to pursue LTAC placement post trach and PEG    Medical Decision Making  Number and Complexity of problems:   1) exacerbation of respiratory failure with hypoxia secondary to pulmonary edema, acute, high complexity  2) PAF with RVR, acute, moderate complexity  3) meningioma versus intracranial infection, acute, high complexity  4) type 2 diabetes, chronic, moderate complexity  5) elevated liver enzymes acute moderate complexity  6) elevated WBC acute moderate complexity  Differential  Diagnosis: Pulmonary embolus, LV dysfunction     Conditions and Status        Condition is unchanged.     MDM Data  External documents reviewed: No new data  Cardiac tracing (EKG, telemetry) interpretation: A-fib rate controlled  Radiology interpretation: Chest x-ray has increased interstitial markings consistent with fluid overload.  Labs reviewed:  reviewed  Any tests that were considered but not ordered: None     Decision rules/scores evaluated (example [id number], Wells, etc): [id number] score of 5 with a 7.2% risk of stroke annually     Discussed with:  nursing      Care Planning  Shared decision making: Patient is sedated  Code status and discussions: full     Disposition  Social Determinants of Health that impact treatment or disposition: none  I expect the patient to be discharged by the primary service.    Electronically signed by Ramin Singh DO, 04/24/23, 10:17 CDT.

## 2023-04-24 NOTE — PROGRESS NOTES
Neurosurgery Daily Progress Note    HPI:  Elijah Escobar is a 67 y.o. male with a significant medical history of hypertension, diabetes, hyperlipidemia, seizures, craniotomy for tumor (6/16/2022), craniotomy for washout (7/1/2022), craniectomy (10/4/2022), coronary artery disease, diabetes, erectile dysfunction, GERD, hypertension, hyperlipidemia, tobacco abuse, and obesity.  He presents today with a complaint of a 4-5 days onset of progressively worsening right frontal, temporal, and periorbital edema and associated symptoms to include, but not limited to generalized fatigue, chills, dyspnea, intermittent nausea without vomiting, and states he's felt feverish.  Physical exam findings of neurologically intact with right frontal, temporal, and periorbital swelling.  WBC elevated at 15.  3 to an CRP elevated at 14.75.  Imaging pending.    Assessment:   Past Medical History:   Diagnosis Date   • Brain tumor    • Coronary artery disease    • COVID-19 vaccine series completed     MODERNA X 3; LAST DOSE 3/2022   • Diabetes    • Erectile dysfunction    • GERD (gastroesophageal reflux disease)    • Hypercholesteremia    • Hypertension    • Seizure      Active Hospital Problems    Diagnosis    • **Swelling of scalp    • Elevated liver function tests    • COPD (chronic obstructive pulmonary disease)    • Obesity (BMI 30-39.9)    • Tobacco abuse, in remission    • Acute systolic heart failure    • ADDIE (obstructive sleep apnea)    • Type 2 myocardial infarction due to arrhythmia    • Acute pulmonary edema due to A-fib RVR    • Acute respiratory failure with hypoxia    • Intracranial epidural hematoma    • Post-operative infection    • Paroxysmal atrial fibrillation with rapid ventricular response    • Type 2 diabetes mellitus with hyperglycemia and peripheral neuropathy, with long-term current use of insulin (HCC)    • Coronary artery disease    • Meningioma s/p craniotomy      Plan:   Neuro:   Slight improvement.  Now  grimacing and regarding to pain.    Infected cranial flap      POD #19 (4/4/2023)  craniectomy and washout    Postop code CT and MRI stable. No acute abnormalities.    Flap tapped I&D cultures 4+ gram-negative bacilli    Heavy 4+ Serratia marcescens     12 Days Post-Op (4/13/2023) evacuation of scalp hematoma    JUAN removed     Continue neuro exams per policy.  Call for decline  EEG shows no epileptiform activity or ongoing seizures  Anticipate trach and PEG early next week.    CV: Code for PEA yesterday afternoon, 4/16/2023.  Required epinephrine and bicarb   AFib/flutter, rate controlled.   Hold all anticoagulation for 2 weeks.   No evidence of DVT or PE per imaging   Appreciate cardiology  Pulm: Remains intubated off propofol x 48+ hours.     Atelectasis versus pneumonia left lung      :  Voiding per Jeong catheter.  Trace leuks.  TNTC WBC  FEN: Currently NPO  Endocrine: Glucose improving.  Continue SSI  GI: SHERIF.  +BM  ID:  Infected cranial flap  CSF: no growth to date   PNA.  Resolved   UTI.  Resolved   Continue antibiotics.  Currently on Levaquin and vancomycin  Heme:  DVT prophylaxis with SCDs.  Hold all anticoagulation for 2 weeks.  Pain: No complaints at present  Dispo: Pending trach and PEG placement.  ENT and general surgery     Chief complaint:   4-5 days onset of progressively worsening right frontal, temporal, and periorbital edema and associated symptoms to include, but not limited to generalized fatigue, chills, dyspnea, intermittent nausea without vomiting, and states he's felt feverish.    HPI  Subjective  Confusion and slurred speech    Temp:  [98 °F (36.7 °C)-99.3 °F (37.4 °C)] 98.1 °F (36.7 °C)  Heart Rate:  [] 82  Resp:  [13-21] 19  BP: (112-153)/(62-85) 121/68  FiO2 (%):  [30 %-40 %] 30 %    Output by Drain (mL) 04/23/23 0701 - 04/23/23 1900 04/23/23 1901 - 04/24/23 0700 04/24/23 0701 - 04/24/23 0832 Range Total   Requested LDAs do not have output data documented.     Objective:  Vital  "signs: (most recent): Blood pressure 121/68, pulse 82, temperature 98.1 °F (36.7 °C), temperature source Axillary, resp. rate 19, height 177.8 cm (70\"), weight 125 kg (274 lb 11.2 oz), SpO2 99 %.      Neurologic Exam     Mental Status      With sedation paused gag and cough intact, pupillary response intact, does not follow commands.    Grimace to pain.       Cranial Nerves     CN III, IV, VI   Pupils are equal, round, and reactive to light.    CN VII   Right facial weakness: none  Left facial weakness: none    CN IX, X   Right gag reflex: normal  Left gag reflex: normal    Motor Exam   Regards to painful stimuli to the upper extremities  Withdraws to painful stimuli to lower extremities     Drains: * No LDAs found *    Imaging Results (Last 24 Hours)     Procedure Component Value Units Date/Time    XR Chest 1 View [[id number]] Collected: 04/24/23 0745     Updated: 04/24/23 0754    Narrative:      EXAMINATION: XR CHEST 1 VW- 4/24/2023 7:45 AM CDT     HISTORY: Intubated and sedated; S06.4X0A-Epidural hemorrhage without  loss of consciousness, initial encounter; Z74.09-Other reduced mobility;  Z98.890-Other specified postprocedural states; T81.42XA-Infection  following a procedure, deep incisional surgical site, initial encounter;  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     COMPARISON: Chest x-ray 4/23/2020 1044 hours.     Endotracheal tube, NG tube and right internal jugular central line  appear in satisfactory position as before. There is volume loss in the  lung bases, with mild central vascular congestion, and probable volume  overload, which appears improved. There are persistent patchy subhilar  airspace opacities greater on the right extending into the right lateral  base, without lobar consolidation. No pneumothorax or pleural effusion  is identified.       Impression:      1. Stable satisfactory " position of multiple life-support lines.  2. Mild improvement in pulmonary edema and volume overload.  This report was finalized on 04/24/2023 07:51 by Dr. Elijah Grover MD.    XR Chest 1 View [[id number]] Collected: 04/23/23 1113     Updated: 04/23/23 1119    Narrative:      EXAMINATION: XR CHEST 1 VW- 4/23/2023 11:13 AM CDT     HISTORY: advanced ETT; S06.4X0A-Epidural hemorrhage without loss of  consciousness, initial encounter; Z74.09-Other reduced mobility;  Z98.890-Other specified postprocedural states; T81.42XA-Infection  following a procedure, deep incisional surgical site, initial encounter;  D32.9-Benign neoplasm of meninges, unspecified; E11.65-Type 2 diabetes  mellitus with hyperglycemia; Z79.4-Long term (current) use of ins     REPORT: A frontal view of the chest was obtained.     COMPARISON: Chest x-ray 04/23/2023 0320 hours.     The endotracheal tube has been advanced slightly, tip projects  approximately 5 cm superior to the robert in satisfactory position. The  right internal jugular central line appears in good position as before.  There is a nasogastric tube, the tube reaches the stomach but the tip is  not included in the field-of-view. The lungs are hypoaerated, there is  central vascular congestion, with probable interstitial edema and volume  overload. Pneumonia is not excluded but felt less likely. Heart size  appears normal. The upper abdomen is unremarkable.       Impression:      Slight advancement of the endotracheal tube, in satisfactory  position, with the tip approximately 5 cm superior to the robert. The  right IJ central line and NG tube appear to be in good position as  before. Persistent, mildly improved central pulmonary edema and vascular  congestion.  This report was finalized on 04/23/2023 11:16 by Dr. Elijah Grover MD.        Lab Results (last 24 hours)     Procedure Component Value Units Date/Time    CBC & Differential [[id number]]  (Abnormal) Collected: 04/23/23 3675     Specimen: Blood Updated: 04/24/23 0750    Narrative:      The following orders were created for panel order CBC & Differential.  Procedure                               Abnormality         Status                     ---------                               -----------         ------                     CBC Auto Differential[[id number]]        Abnormal            Final result                 Please view results for these tests on the individual orders.    CBC Auto Differential [[id number]]  (Abnormal) Collected: 04/23/23 5996    Specimen: Blood Updated: 04/24/23 0750     WBC 10.38 10*3/mm3      RBC 3.36 10*6/mm3      Hemoglobin 10.0 g/dL      Hematocrit 33.1 %      MCV 98.5 fL      MCH 29.8 pg      MCHC 30.2 g/dL      RDW 13.6 %      RDW-SD 48.1 fl      MPV 11.4 fL      Platelets 245 10*3/mm3      Neutrophil % 68.0 %      Lymphocyte % 20.6 %      Monocyte % 10.1 %      Eosinophil % 0.5 %      Basophil % 0.4 %      Immature Grans % 0.4 %      Neutrophils, Absolute 7.06 10*3/mm3      Lymphocytes, Absolute 2.14 10*3/mm3      Monocytes, Absolute 1.05 10*3/mm3      Eosinophils, Absolute 0.05 10*3/mm3      Basophils, Absolute 0.04 10*3/mm3      Immature Grans, Absolute 0.04 10*3/mm3      nRBC 0.0 /100 WBC     POC Glucose Once [[id number]]  (Abnormal) Collected: 04/24/23 0535    Specimen: Blood Updated: 04/24/23 0546     Glucose 226 mg/dL      Comment: : [id number] Francisco CharidaMeter ID: [id number]       Blood Gas, Arterial - [[id number]]  (Abnormal) Collected: 04/24/23 0347    Specimen: Arterial Blood Updated: 04/24/23 0346     Site Right Radial     Marek's Test Positive     pH, Arterial 7.512 pH units      Comment: 83 Value above reference range        pCO2, Arterial 40.8 mm Hg      pO2, Arterial 77.4 mm Hg      Comment: 84 Value below reference range        HCO3, Arterial 32.7 mmol/L      Comment: 83 Value above reference range        Base Excess, Arterial 8.9 mmol/L      Comment: 83 Value above reference range         O2 Saturation, Arterial 96.3 %      Temperature 37.0 C      Barometric Pressure for Blood Gas 754 mmHg      Modality Ventilator     FIO2 30 %      Ventilator Mode PC     Set [facility] Resp Rate 10.0     PEEP 5.0     PIP 12 cmH2O      Comment: Meter: [id number]     :  [email or web address]        Collected by [id number]     pCO2, Temperature Corrected 40.8 mm Hg      pH, Temp Corrected 7.512 pH Units      pO2, Temperature Corrected 77.4 mm Hg     Comprehensive Metabolic Panel [[id number]]  (Abnormal) Collected: 04/24/23 0225    Specimen: Blood Updated: 04/24/23 0318     Glucose 231 mg/dL      BUN 33 mg/dL      Creatinine 1.11 mg/dL      Sodium 139 mmol/L      Potassium 3.8 mmol/L      Chloride 99 mmol/L      CO2 30.0 mmol/L      Calcium 8.3 mg/dL      Total Protein 5.9 g/dL      Albumin 2.9 g/dL      ALT (SGPT) 20 U/L      AST (SGOT) 15 U/L      Alkaline Phosphatase 106 U/L      Total Bilirubin 0.3 mg/dL      Globulin 3.0 gm/dL      A/G Ratio 1.0 g/dL      BUN/Creatinine Ratio 29.7     Anion Gap 10.0 mmol/L      eGFR 72.8 mL/min/1.73     Narrative:      GFR Normal >60  Chronic Kidney Disease <60  Kidney Failure <15      Blood Culture - Blood, Hand, Right [[id number]]  (Normal) Collected: 04/19/23 0209    Specimen: Blood from Hand, Right Updated: 04/24/23 0230     Blood Culture No growth at 5 days    Blood Culture - Blood, Hand, Left [[id number]]  (Normal) Collected: 04/19/23 0207    Specimen: Blood from Hand, Left Updated: 04/24/23 0230     Blood Culture No growth at 5 days    C-reactive Protein [[id number]]  (Abnormal) Collected: 04/23/23 2354    Specimen: Blood Updated: 04/24/23 0022     C-Reactive Protein 2.75 mg/dL     POC Glucose Once [[id number]]  (Abnormal) Collected: 04/23/23 2359    Specimen: Blood Updated: 04/24/23 0009     Glucose 256 mg/dL      Comment: : [id number] Francisco CharidaMeter ID: [id number]       Sedimentation Rate [[id number]]  (Abnormal) Collected: 04/23/23 2354    Specimen: Blood Updated: 04/24/23  0008     Sed Rate 55 mm/hr     POC Glucose Once [[id number]]  (Abnormal) Collected: 04/23/23 2026    Specimen: Blood Updated: 04/23/23 2037     Glucose 267 mg/dL      Comment: : [id number] Francisco Roper ID: [id number]       POC Glucose Once [[id number]]  (Abnormal) Collected: 04/23/23 1806    Specimen: Blood Updated: 04/23/23 1828     Glucose 307 mg/dL      Comment: : [id number] Phoenix Skinner ID: [id number]       POC Glucose Once [[id number]]  (Abnormal) Collected: 04/23/23 1209    Specimen: Blood Updated: 04/23/23 1225     Glucose 249 mg/dL      Comment: : [id number] Phoenix Skinner ID: [id number]           Rahul Arnold, APRN

## 2023-04-24 NOTE — PLAN OF CARE
Goal Outcome Evaluation:      Pt does not follow commands but does withdraw from pain in both lower extremities. Pt has remained in Afib 's and BP has been stable. Total urine output for the shift has been 1,050 ml's. Vent setting remain at Rate of 10, FiO2 at 30%, and Peep of 5.Pt had one BM this shift. Tolerating tube feeding well with max residuals of 50 ml's.  No morphine was given this shift. Pt was able to remain calm and tolerate vent well throughout the shift. Remained afebrile and pt safety maintained.

## 2023-04-24 NOTE — THERAPY TREATMENT NOTE
Acute Care - Physical Therapy Treatment Note  [facility]     Patient Name: Elijah Escobar  : 1955  MRN: [id number]  Today's Date: 2023      Visit Dx:     ICD-10-CM ICD-9-CM   1. Intracranial epidural hematoma  S06.4X0A 852.40   2. Impaired mobility  Z74.09 799.89   3. S/P craniotomy  Z98.890 V45.89   4. Infection of deep incisional surgical site after procedure, initial encounter  T81.42XA 998.59   5. Meningioma s/p craniotomy  D32.9 225.2   6. Type 2 diabetes mellitus with hyperglycemia and peripheral neuropathy, with long-term current use of insulin (HCC)  E11.65 250.00    Z79.4 790.29     V58.67   7. Paroxysmal atrial fibrillation with rapid ventricular response  I48.0 427.31   8. Swelling of scalp  R22.0 784.2   9. Acute pulmonary edema due to A-fib RVR  J81.0 518.4   10. Acute respiratory failure with hypoxia  J96.01 518.81   11. Type 2 myocardial infarction due to arrhythmia  I49.9 427.9    I21.A1 410.90   12. Obesity (BMI 30-39.9)  E66.9 278.00   13. Tobacco abuse, in remission  F17.201 305.1   14. Acute systolic heart failure  I50.21 428.21   15. ADDIE (obstructive sleep apnea)  G47.33 327.23   16. Secondary hypertension  I15.9 405.99   17. Gastroesophageal reflux disease, unspecified whether esophagitis present  K21.9 530.81   18. Class 2 severe obesity due to excess calories with serious comorbidity and body mass index (BMI) of 38.0 to 38.9 in adult  E66.01 278.01    Z68.38 V85.38   19. Leukocytosis, unspecified type  D72.829 288.60   20. Anemia due to other cause, not classified  D64.89 285.8   21. Smoker  F17.200 305.1   22. History of cranioplasty  Z98.890 V45.89   23. Facial edema  R60.0 782.3   24. Coronary artery disease due to lipid rich plaque  I25.10 414.00    I25.83 414.3   25. Postoperative infection, unspecified type, initial encounter  T81.40XA 998.59   26. Chronic obstructive pulmonary disease, unspecified COPD type  J44.9 496     Patient Active Problem List   Diagnosis   •  Meningioma s/p craniotomy   • Hypertension   • GERD (gastroesophageal reflux disease)   • Coronary artery disease   • Class 2 severe obesity due to excess calories with serious comorbidity and body mass index (BMI) of 38.0 to 38.9 in adult   • Type 2 diabetes mellitus with hyperglycemia and peripheral neuropathy, with long-term current use of insulin (HCC)   • Leukocytosis   • Other specified anemias   • Paroxysmal atrial fibrillation with rapid ventricular response   • Post-operative infection   • Smoker   • History of cranioplasty   • Facial edema   • Swelling of scalp   • Acute pulmonary edema due to A-fib RVR   • Acute respiratory failure with hypoxia   • Type 2 myocardial infarction due to arrhythmia   • COPD (chronic obstructive pulmonary disease)   • Obesity (BMI 30-39.9)   • Tobacco abuse, in remission   • Acute systolic heart failure   • ADDIE (obstructive sleep apnea)   • Intracranial epidural hematoma   • Elevated liver function tests     Past Medical History:   Diagnosis Date   • Brain tumor    • Coronary artery disease    • COVID-19 vaccine series completed     MODERNA X 3; LAST DOSE 3/2022   • Diabetes    • Erectile dysfunction    • GERD (gastroesophageal reflux disease)    • Hypercholesteremia    • Hypertension    • Seizure      Past Surgical History:   Procedure Laterality Date   • BALLOON ANGIOPLASTY, ARTERY Right    • COLONOSCOPY     • CRANIECTOMY Right 7/1/2022    Procedure: CRANIECTOMY WITH INCISIONAL WASHOUT;  Surgeon: Felipe Banerjee MD;  Location:  [facility] OR;  Service: Neurosurgery;  Laterality: Right;   • CRANIOPLASTY Right 10/4/2022    Procedure: CRANIOPLASTY right with Lumbar drain;  Surgeon: Flaco Portillo MD;  Location:  [facility];  Service: Neurosurgery;  Laterality: Right;   • CRANIOPLASTY N/A 4/4/2023    Procedure: CRANIOPLASTY, removal, right, horseshoe;  Surgeon: Flaco Portillo MD;  Location:  [facility];  Service: Neurosurgery;  Laterality: N/A;   • CRANIOTOMY Right  4/12/2023    Procedure: CRANIOTOMY; evacuation of right hematoma;  Surgeon: Flaco Portillo MD;  Location:  [facility];  Service: Neurosurgery;  Laterality: Right;   • CRANIOTOMY FOR TUMOR Right 6/16/2022    Procedure: CRANIOTOMY FOR TUMOR STERIOTACTIC WITH BRAIN LAB, right;  Surgeon: Flaco Portillo MD;  Location:  [facility];  Service: Neurosurgery;  Laterality: Right;     PT Assessment (last 12 hours)     PT Evaluation and Treatment     Row Name 04/24/23 0830          Physical Therapy Time and Intention    Subjective Information no complaints  Vent  -TB     Document Type therapy note (daily note)  -TB     Mode of Treatment physical therapy  -TB     Row Name 04/24/23 0830          General Information    Existing Precautions/Restrictions fall;oxygen therapy device and L/min  Vent  -TB     Row Name 04/24/23 0830          Bed Mobility    Bed Mobility rolling left;rolling right;scooting/bridging  -TB     Rolling Left [place] (Bed Mobility) dependent (less than 25% patient effort);2 person assist  -TB     Rolling Right [place] (Bed Mobility) dependent (less than 25% patient effort);2 person assist  -TB     Scooting/Bridging [place] (Bed Mobility) dependent (less than 25% patient effort);2 person assist  -TB     Row Name 04/24/23 0830          Motor Skills    Therapeutic Exercise --  PROM BLE and BUE  -TB     Row Name             Wound 04/04/23 1111 Right temporal region Incision    Wound - Properties Group Placement Date: 04/04/23  -CHRISTIANO Placement Time: 1111  -CHRISTIANO Side: Right  -CHRISTIANO Location: temporal region  -CHRISTIANO Primary Wound Type: Incision  -CHRISTIANO    Retired Wound - Properties Group Placement Date: 04/04/23  -CHRISTIANO Placement Time: 1111  -CHRISTIANO Side: Right  -CHRISTIANO Location: temporal region  -CHRISTIANO Primary Wound Type: Incision  -CHRISTIANO    Retired Wound - Properties Group Date first assessed: 04/04/23  -CHRISTIANO Time first assessed: 1111  -CHRISTIANO Side: Right  -CHRISTIANO Location: temporal region  -CHRISTIANO Primary Wound Type: Incision  -CHRISTIANO     Row Name             Wound 04/12/23 1622 Right scalp Incision    Wound - Properties Group Placement Date: 04/12/23  -SAFIA Placement Time: 1622 -SAFIA Side: Right  -SAFIA Location: scalp  -SAFIA Primary Wound Type: Incision  -SAFIA    Retired Wound - Properties Group Placement Date: 04/12/23  -SAFIA Placement Time: 1622 -SAFIA Side: Right  -SAFIA Location: scalp  -SAFIA Primary Wound Type: Incision  -SAFIA    Retired Wound - Properties Group Date first assessed: 04/12/23  -SAFIA Time first assessed: 1622 -SAFIA Side: Right  -SAFIA Location: scalp  -SAFIA Primary Wound Type: Incision  -SAFIA    Row Name 04/24/23 0830          Plan of Care Review    Plan of Care Reviewed With patient  -TB     Progress no change  -TB     Outcome Evaluation Pt still on vent. Pt opened his eyes twice when therpaist called out his name but otherwise did not follow any commands. Performed PROM BUE and BLE. Dep x2 to scoot up in bed and roll and reposition.  -TB     Row Name 04/24/23 0830          Positioning and Restraints    Pre-Treatment Position in bed  -TB     Post Treatment Position bed  -TB     In Bed fowlers;patient within staff view;side rails up x3;RUE elevated;LUE elevated;SCD pump applied;legs elevated  -TB           User Key  (r) = Recorded By, (t) = Taken By, (c) = Cosigned By    Initials Name Provider Type    Kierra Conroy, RN Registered Nurse    Ricky George RN Registered Nurse    Tere Berger, PTA Physical Therapist Assistant                Physical Therapy Education     Title: PT OT SLP Therapies (Done)     Topic: Physical Therapy (Done)     Point: Mobility training (Done)     Learning Progress Summary           Patient Acceptance, E,TB, VU by EVONNE at 4/24/2023 0041    Acceptance, E, NR by JUAN at 4/11/2023 0809    Comment: Looking ahead during gait    Acceptance, E,D, DU,VU by JUAN at 4/10/2023 1447    Comment: Safety with transfers, please take time, no impulsiveness    Acceptance, E,TB, VU by AMY at 4/6/2023 1350    Acceptance, E, VU by SB  at 4/5/2023 1605    Comment: helmet fit, safety, POC    Acceptance, E, VU by CHRISTIANO at 4/3/2023 1353    Comment: gait, safety,    Acceptance, E, VU by SB at 4/1/2023 1405    Comment: pt edu on POC, benefits of act, PLB and d/c plans   Family Acceptance, E,TB, VU by CR at 4/24/2023 0041    Acceptance, E,TB, VU by CM at 4/6/2023 1350                   Point: Home exercise program (Done)     Learning Progress Summary           Patient Acceptance, E,TB, VU by CR at 4/24/2023 0041    Acceptance, E, NR by JESSICA at 4/19/2023 0815    Comment: progression of PT    Acceptance, E,TB, VU by CM at 4/6/2023 1350   Family Acceptance, E,TB, VU by CR at 4/24/2023 0041    Acceptance, E,TB, VU by CM at 4/6/2023 1350                   Point: Body mechanics (Done)     Learning Progress Summary           Patient Acceptance, E,TB, VU by CR at 4/24/2023 0041    Acceptance, E,TB, VU by CM at 4/6/2023 1350   Family Acceptance, E,TB, VU by CR at 4/24/2023 0041    Acceptance, E,TB, VU by CM at 4/6/2023 1350                   Point: Precautions (Done)     Learning Progress Summary           Patient Acceptance, E,TB, VU by CR at 4/24/2023 0041    Acceptance, E,TB, VU by CM at 4/6/2023 1350    Acceptance, E, VU by SB at 4/5/2023 1605    Comment: helmet fit, safety, POC    Acceptance, E, VU by SB at 4/1/2023 1405    Comment: pt edu on POC, benefits of act, PLB and d/c plans   Family Acceptance, E,TB, VU by CR at 4/24/2023 0041    Acceptance, E,TB, VU by CM at 4/6/2023 1350                               User Key     Initials Effective Dates Name Provider Type Discipline    JESSICA 02/03/23 -  Bruce Pillai, PT DPT Physical Therapist PT    JUAN 02/03/23 -  Carrie Callaway PTA Physical Therapist Assistant PT    CHRISTIANO 02/03/23 -  [place], Александр L, PTA Physical Therapist Assistant PT    CM 03/02/23 -  Dianelys Carlton, RN Registered Nurse Nurse    SB 06/16/21 -  Erika Javier, PT DPT Physical Therapist PT    CR 03/03/23 -  Janice Singh, RN Registered  Nurse Nurse              PT Recommendation and Plan     Plan of Care Reviewed With: patient  Progress: no change  Outcome Evaluation: Pt still on vent. Pt opened his eyes twice when therpaist called out his name but otherwise did not follow any commands. Performed PROM BUE and BLE. Dep x2 to scoot up in bed and roll and reposition.   Outcome Measures     Row Name 04/24/23 0830 04/23/23 0840 04/21/23 1510       How much help from another person do you currently need...    Turning from your back to your side while in flat bed without using bedrails? 1  -TB 1  -LY 1  -TB    Moving from lying on back to sitting on the side of a flat bed without bedrails? 1  -TB 1  -LY 1  -TB    Moving to and from a bed to a chair (including a wheelchair)? 1  -TB 1  -LY 1  -TB    Standing up from a chair using your arms (e.g., wheelchair, bedside chair)? 1  -TB 1  -LY 1  -TB    Climbing 3-5 steps with a railing? 1  -TB 1  -LY 1  -TB    To walk in hospital room? 1  -TB 1  -LY 1  -TB    AM-PAC 6 Clicks Score (PT) 6  -TB 6  -LY 6  -TB       Functional Assessment    Outcome Measure Options AM-PAC 6 Clicks Basic Mobility (PT)  -TB AM-PAC 6 Clicks Basic Mobility (PT)  -LY AM-PAC 6 Clicks Basic Mobility (PT)  -TB          User Key  (r) = Recorded By, (t) = Taken By, (c) = Cosigned By    Initials Name Provider Type    Tere Berger, PTA Physical Therapist Assistant    Cary Martinez, PTA Physical Therapist Assistant                 Time Calculation:    PT Charges     Row Name 04/24/23 1054             Time Calculation    Start Time 0830  -TB      Stop Time 0854  -TB      Time Calculation (min) 24 min  -TB      PT Received On 04/24/23  -TB         Time Calculation- PT    Total Timed Code Minutes- PT 24 minute(s)  -TB            User Key  (r) = Recorded By, (t) = Taken By, (c) = Cosigned By    Initials Name Provider Type    Tere Berger, PTA Physical Therapist Assistant              Therapy Charges for Today     Code  Description Service Date Service Provider Modifiers Qty    [id number] HC PT THER PROC EA 15 MIN 4/24/2023 Tere Miller, PTA GP 2          PT G-Codes  Outcome Measure Options: AM-PAC 6 Clicks Basic Mobility (PT)  AM-PAC 6 Clicks Score (PT): 6  AM-PAC 6 Clicks Score (OT): 20    Tere Miller PTA  4/24/2023

## 2023-04-24 NOTE — PLAN OF CARE
Goal Outcome Evaluation:  Plan of Care Reviewed With: other (see comments) (RN)        Progress: no change  Outcome Evaluation: Ntn follow up. Pt cont on vent support. He is not currently receiving propofol sedation. Reassessed nutrient needs with current vent settings.  His BS is staying consistently high in the 200's. RN reached out regarding a lower carb formula. Pt has been on Peptamen AF which is very low in CHO's, (35%). Has currently been receiving 135g CHO's daily with current TF. Will change formula to Diabetisource AC. To meet needs, will have to increase rate to 65mL/hour. This formula will actually provide slightly higher CHO's, 143g. But, Diabetisource AC is higher in fiber, therefore, that could potentially help decrease BS. If BS continues to run high, he may need increase in long acting insulin. Will follow and make adjustments as necessary.

## 2023-04-24 NOTE — SIGNIFICANT NOTE
04/24/23 0600   Readings   PEEP Intrinsic (cm H2O) 4.6 cm H2O   Plateau Pressure (cm H2O) 18 cm H2O   Driving Pressure (cm H2O) 12.4 cm H2O   Static Compliance (L/cm H2O) 44   Dynamic Compliance (L/cm H2O) 89 L/cm H2O

## 2023-04-24 NOTE — PLAN OF CARE
Goal Outcome Evaluation:    Problem: Device-Related Complication Risk (Mechanical Ventilation, Invasive)  Goal: Optimal Device Function  Outcome: Ongoing, Progressing     Problem: Inability to Wean (Mechanical Ventilation, Invasive)  Goal: Mechanical Ventilation Liberation  Outcome: Ongoing, Not Progressing     Problem: Skin and Tissue Injury (Mechanical Ventilation, Invasive)  Goal: Absence of Device-Related Skin and Tissue Injury  Outcome: Ongoing, Progressing     Problem: Ventilator-Induced Lung Injury (Mechanical Ventilation, Invasive)  Goal: Absence of Ventilator-Induced Lung Injury  Outcome: Ongoing, Progressing

## 2023-04-24 NOTE — PLAN OF CARE
Goal Outcome Evaluation:  Plan of Care Reviewed With: patient        Progress: no change  Outcome Evaluation: Pt still on vent. Pt opened his eyes twice when therpaist called out his name but otherwise did not follow any commands. Performed PROM BUE and BLE. Dep x2 to scoot up in bed and roll and reposition.

## 2023-04-24 NOTE — CONSULTS
[facility]   Consult Note    Patient Name: Elijah Escobar  : 1955  MRN: [id number]  Primary Care Physician:  Brianna Martinez APRN  Referring Physician: Flaco Portillo, *  Date of admission: 3/31/2023    Consults  Subjective   Subjective     Reason for Consult/ Chief Complaint: difficult airway; prolonged intubation     History of Present Illness  Elijah Escobar is a 67 y.o. male with an extensive medical history including hypertension, diabetes, hyperlipidemia, seizures, and coronary artery disease.   Patient underwent craniotomy 2022 for tumor, craniotomy 2022 for wash out and craniectomy 10/2022   Patient presented to [facility] with complaints of right frontal, temporal and periorbital edema and was intubated 2023 following period of agitation and decreased oxygen saturation   Patient is currently intubated and sedated and ent consultation was made regarding long term airway management           Review of Systems   Unable to obtain patient intubated and sedated        Personal History     Past Medical History:   Diagnosis Date   • Brain tumor    • Coronary artery disease    • COVID-19 vaccine series completed     MODERNA X 3; LAST DOSE 3/2022   • Diabetes    • Erectile dysfunction    • GERD (gastroesophageal reflux disease)    • Hypercholesteremia    • Hypertension    • Seizure        Past Surgical History:   Procedure Laterality Date   • BALLOON ANGIOPLASTY, ARTERY Right    • COLONOSCOPY     • CRANIECTOMY Right 2022    Procedure: CRANIECTOMY WITH INCISIONAL WASHOUT;  Surgeon: Felipe Banerjee MD;  Location: [facility] OR;  Service: Neurosurgery;  Laterality: Right;   • CRANIOPLASTY Right 10/4/2022    Procedure: CRANIOPLASTY right with Lumbar drain;  Surgeon: Flaco Portillo MD;  Location: [facility] OR;  Service: Neurosurgery;  Laterality: Right;   • CRANIOPLASTY N/A 2023    Procedure: CRANIOPLASTY, removal, right, horseshoe;  Surgeon: Flaco Portillo MD;  Location:   [facility] OR;  Service: Neurosurgery;  Laterality: N/A;   • CRANIOTOMY Right 4/12/2023    Procedure: CRANIOTOMY; evacuation of right hematoma;  Surgeon: Flaco Portillo MD;  Location:  [facility] OR;  Service: Neurosurgery;  Laterality: Right;   • CRANIOTOMY FOR TUMOR Right 6/16/2022    Procedure: CRANIOTOMY FOR TUMOR STERIOTACTIC WITH BRAIN LAB, right;  Surgeon: Flaco Portillo MD;  Location:  [facility] OR;  Service: Neurosurgery;  Laterality: Right;       Family History: His family history includes Cancer in his mother; Heart disease in his father.     Social History: He  reports that he quit smoking about 5 weeks ago. His smoking use included cigarettes. He has a 3.75 pack-year smoking history. He has never used smokeless tobacco. He reports that he does not drink alcohol and does not use drugs.    Home Medications:  HYDROcodone-acetaminophen, acetaminophen, amLODIPine, aspirin, atorvastatin, gabapentin, insulin detemir, levETIRAcetam, metoprolol tartrate, omeprazole, and sotalol    Allergies:  He has No Known Allergies.    Objective    Objective     Vitals:    Temp:  [98 °F (36.7 °C)-99.3 °F (37.4 °C)] 98.3 °F (36.8 °C)  Heart Rate:  [] 83  Resp:  [13-23] 19  BP: (113-153)/(62-98) 118/73  FiO2 (%):  [30 %-40 %] 30 %  Output by Drain (mL) 04/23/23 0701 - 04/23/23 1900 04/23/23 1901 - 04/24/23 0700 04/24/23 0701 - 04/24/23 1315 Range Total   Urethral Catheter 1150 [phone]       Physical Exam  Constitutional:       Appearance: He is obese.      Comments: Intubated and sedated  No family at bedside      HENT:      Mouth/Throat:      Mouth: Mucous membranes are dry.      Comments: ett noted     Neck:      Comments: Thick short neck           Result Review    Result Review:  I have personally reviewed the results from the time of this admission to 4/24/2023 13:15 CDT and agree with these findings:  []  Laboratory list / accordion  []  Microbiology  []  Radiology  []  EKG/Telemetry   []   Cardiology/Vascular   []  Pathology  []  Old records  []  Other:  Most notable findings include:       Assessment & Plan   Assessment / Plan     Brief Patient Summary:  Elijah Escobar is a 67 y.o. male who has an extensive medical history and was intubated 4/16 due to diminished oxygenation as well as agitation   ent consultation was made for future airway management         Active Hospital Problems:  Active Hospital Problems    Diagnosis    • **Swelling of scalp    • Elevated liver function tests    • COPD (chronic obstructive pulmonary disease)    • Obesity (BMI 30-39.9)    • Tobacco abuse, in remission    • Acute systolic heart failure    • ADDIE (obstructive sleep apnea)    • Type 2 myocardial infarction due to arrhythmia    • Acute pulmonary edema due to A-fib RVR    • Acute respiratory failure with hypoxia    • Intracranial epidural hematoma    • Post-operative infection    • Paroxysmal atrial fibrillation with rapid ventricular response    • Type 2 diabetes mellitus with hyperglycemia and peripheral neuropathy, with long-term current use of insulin (HCC)    • Coronary artery disease    • Meningioma s/p craniotomy        Plan:   Will continue to follow and discuss for possibility of tracheostomy placement     Sade Lisa, APRN

## 2023-04-24 NOTE — PROGRESS NOTES
[facility] [place] [facility] [place] - [facility]    Patient: Elijah Escobar    1955    MR# [id number]    Acct# [id number]  04/24/23   06:58 CDT  Referring Provider: Flaco Portillo, *    Chief Complaint: Mechanically ventilated    Interval history: Afebrile.  Saturation 98 on PEEP of 5 and FiO2 0.3.  ABGs adequate.  ET tube in good position, persistent bilateral interstitial lung infiltrates.  He is still on IV levofloxacin.  He is receiving Lasix 40 mg twice daily. He is being ventilated on pressure control.  Tidal volume 580. Metabolic panel stable.  He has not had a CBC since 4-19.  Order placed. Nutrition via TF.  Nursing reports he is still not following commands but does withdraw to stimuli.  He has a good strong cough.  He did not receive any as needed medications for agitation overnight.  Encouraged to continue to hold these if feasible to allow for vent liberation efforts today.    Meds:  aspirin, 81 mg, Oral, Daily  atorvastatin, 20 mg, Oral, Nightly  budesonide, 0.5 mg, Nebulization, BID - RT  Chlorhexidine Gluconate Cloth, 1 application, Topical, Q24H  digoxin, 250 mcg, Oral, Daily  dilTIAZem, 90 mg, Oral, Q8H  furosemide, 40 mg, Intravenous, BID  gabapentin, 300 mg, Oral, Q8H  insulin detemir, 25 Units, Subcutaneous, Q12H  insulin lispro, 0-14 Units, Subcutaneous, Q6H  ipratropium, 0.5 mg, Nebulization, 4x Daily - RT  levETIRAcetam, 500 mg, Oral, BID  levoFLOXacin, 500 mg, Oral, Q24H  methylnaltrexone, 12 mg, Subcutaneous, Every Other Day  metoclopramide, 10 mg, Oral, 4x Daily AC & at Bedtime  metoprolol tartrate, 50 mg, Oral, Q12H  multivitamin with minerals, 1 tablet, Oral, Daily  pantoprazole, 40 mg, Intravenous, Q AM  polyethylene glycol, 17 g, Oral, Daily  sennosides-docusate, 1 tablet, Oral, Daily  sodium chloride, 10 mL, Intravenous, Q12H  sucralfate, 1 g, Oral, 4x Daily AC & at Bedtime      propofol, 5-50 mcg/kg/min, Last Rate: 5 mcg/kg/min (04/23/23  0253)        Ventilator Settings:        Resp Rate (Set): 10     FiO2 (%): 30 %  PEEP/CPAP (cm H2O): 5 cm H20  Minute Ventilation (L/min) (Obs): 11.6 L/min  Resp Rate (Observed) Vent: 24  I:E Ratio (Set): 1:4.45  I:E Ratio (Obs): 1:4.5  PIP Observed (cm H2O): 26 cm H2O     Physical Exam:  Temp:  [98 °F (36.7 °C)-99.3 °F (37.4 °C)] 98 °F (36.7 °C)  Heart Rate:  [] 87  Resp:  [13-22] 16  BP: (112-153)/(62-85) 146/77  FiO2 (%):  [30 %-40 %] 30 %  Intake/Output Summary (Last 24 hours) at 4/24/2023 0658  Last data filed at 4/24/2023 0405  Gross per 24 hour   Intake 2698.58 ml   Output 2200 ml   Net 498.58 ml     SpO2 Percentage    04/24/23 0500 04/24/23 0600 04/24/23 0609   SpO2: 98% 99% 96%   Body mass index is 39.42 kg/m².   Physical Exam   Constitutional:       Appearance: He is obese. He is ill-appearing.  Intubated, restrained  HENT:      Head: Normocephalic. Right scalp wound, well-healed, no redness warmth or drainage     Nose: Nose normal.      Mouth/Throat: ETT/OG/TF  Eyes:      General:         Right eye: No discharge.         Left eye: No discharge.   Cardiovascular:      Rate and Rhythm: tachycardia   Pulmonary:      Effort: increased work of breathing      Breath sounds: No wheezing.  Diminished in bases  Abdominal:      General: Abdomen is flat.      Palpations: Abdomen is soft.    Musculoskeletal:      General: Bilateral wrist restraints, SCDs      Right lower leg: Edema      Left lower leg: Edema   Skin:     General: Skin is warm and dry.      Coloration: Skin is not jaundiced or pale.   Neurological:      General: Intubated      Electronically signed by ADITYA Olivera, 4/24/2023, 06:58 CDT      Physician substantive portion: medical decision making  Result Review  Laboratory Data:  Results from last 7 days   Lab Units 04/23/23  2354 04/19/23  0657 04/18/23  0413   WBC 10*3/mm3 10.38 13.61* 16.70*   HEMOGLOBIN g/dL 10.0* 10.2* 10.2*   PLATELETS 10*3/mm3 245 163 203     Results from last 7  "days   Lab Units 04/24/23  0225 04/23/23  2354 04/23/23  0321 04/22/23  0256   SODIUM mmol/L 139  --  140 139   POTASSIUM mmol/L 3.8  --  4.2 3.8   CO2 mmol/L 30.0*  --  29.0 28.0   BUN mg/dL 33*  --  35* 29*   CREATININE mg/dL 1.11  --  1.13 1.15   CRP mg/dL  --  2.75*  --   --      Results from last 7 days   Lab Units 04/24/23  0347 04/23/23  0440 04/22/23  0355   PH, ARTERIAL pH units 7.512* 7.465* 7.458*   PCO2, ARTERIAL mm Hg 40.8 44.6 45.0   PO2 ART mm Hg 77.4* 62.0* 167.0*   FIO2 % 30 40 50     Microbiology Results (last 10 days)     Procedure Component Value - Date/Time    Chlamydia trachomatis, Neisseria gonorrhoeae, PCR - Swab, Penis [[id number]]  (Normal) Collected: 04/19/23 0936    Lab Status: Final result Specimen: Swab from Penis Updated: 04/19/23 1425     Chlamydia DNA by PCR Not Detected     Neisseria gonorrhoeae by PCR Not Detected    Narrative:      Disclaimer: The Aptima Combo 2 assay is a target amplification nucleic acid probe test that utilizes target capture for the in vitro qualitative detection and differentiation of ribosomal RNA from Chlamydia trachomatis and Neisseria gonorrhoeae to aid in the diagnosis of chlamydial and/or gonococcal urogenital disease.  Cell culture was once considered to be the \"gold standard\" for detection of CT and NG.  Culture is quite specific, but scientific studies have demonstrated that the NAAT DNA probe technologies have higher clinical sensitivities than culture.    Genital Culture - Swab, Penis [[id number]] Collected: 04/19/23 0936    Lab Status: Final result Specimen: Swab from Penis Updated: 04/22/23 0549     Genital Culture No growth at 3 days     Gram Stain Few (2+) WBCs seen      No organisms seen    Urine Culture - Urine, Indwelling Urethral Catheter [[id number]]  (Normal) Collected: 04/19/23 0307    Lab Status: Final result Specimen: Urine from Indwelling Urethral Catheter Updated: 04/20/23 1207     Urine Culture No growth    Blood Culture - Blood, Hand, " Right [[id number]]  (Normal) Collected: 04/19/23 0209    Lab Status: Final result Specimen: Blood from Hand, Right Updated: 04/24/23 0230     Blood Culture No growth at 5 days    Blood Culture - Blood, Hand, Left [[id number]]  (Normal) Collected: 04/19/23 0207    Lab Status: Final result Specimen: Blood from Hand, Left Updated: 04/24/23 0230     Blood Culture No growth at 5 days         Recent films:  XR Chest 1 View    Result Date: 4/24/2023  EXAMINATION: XR CHEST 1 VW- 4/24/2023 7:45 AM CDT  HISTORY: Intubated and sedated; S06.4X0A-Epidural hemorrhage without loss of consciousness, initial encounter; Z74.09-Other reduced mobility; Z98.890-Other specified postprocedural states; T81.42XA-Infection following a procedure, deep incisional surgical site, initial encounter; 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  COMPARISON: Chest x-ray 4/23/2020 1044 hours.  Endotracheal tube, NG tube and right internal jugular central line appear in satisfactory position as before. There is volume loss in the lung bases, with mild central vascular congestion, and probable volume overload, which appears improved. There are persistent patchy subhilar airspace opacities greater on the right extending into the right lateral base, without lobar consolidation. No pneumothorax or pleural effusion is identified.      Impression: 1. Stable satisfactory position of multiple life-support lines. 2. Mild improvement in pulmonary edema and volume overload. This report was finalized on 04/24/2023 07:51 by Dr. Elijah Grover MD.    XR Chest 1 View    Result Date: 4/23/2023  EXAMINATION: XR CHEST 1 VW- 4/23/2023 11:13 AM CDT  HISTORY: advanced ETT; S06.4X0A-Epidural hemorrhage without loss of consciousness, initial encounter; Z74.09-Other reduced mobility; Z98.890-Other specified postprocedural states; T81.42XA-Infection following a procedure,  deep incisional surgical site, initial encounter; D32.9-Benign neoplasm of meninges, unspecified; E11.65-Type 2 diabetes mellitus with hyperglycemia; Z79.4-Long term (current) use of ins  REPORT: A frontal view of the chest was obtained.  COMPARISON: Chest x-ray 04/23/2023 0320 hours.  The endotracheal tube has been advanced slightly, tip projects approximately 5 cm superior to the robert in satisfactory position. The right internal jugular central line appears in good position as before. There is a nasogastric tube, the tube reaches the stomach but the tip is not included in the field-of-view. The lungs are hypoaerated, there is central vascular congestion, with probable interstitial edema and volume overload. Pneumonia is not excluded but felt less likely. Heart size appears normal. The upper abdomen is unremarkable.      Impression: Slight advancement of the endotracheal tube, in satisfactory position, with the tip approximately 5 cm superior to the robert. The right IJ central line and NG tube appear to be in good position as before. Persistent, mildly improved central pulmonary edema and vascular congestion. This report was finalized on 04/23/2023 11:16 by Dr. Elijah Grover MD.    XR Chest 1 View    Result Date: 4/23/2023  EXAMINATION: XR CHEST 1 VW- 4/23/2023 8:15 AM CDT  HISTORY: Intubated and sedated; S06.4X0A-Epidural hemorrhage without loss of consciousness, initial encounter; Z74.09-Other reduced mobility; Z98.890-Other specified postprocedural states; T81.42XA-Infection following a procedure, deep incisional surgical site, initial encounter; D32.9-Benign neoplasm of meninges, unspecified; E11.65-Type 2 diabetes mellitus with hyperglycemia; Z79.4-Long term (current) u  REPORT: A frontal view the chest was obtained.  COMPARISON: Chest x-ray 04/22/2023 0311 hours.  The patient remains intubated, the tip of the endotracheal tube is approximately 7.1 cm superior to the robert, and could be advanced up to 3  cm. A nasogastric tube is present, but reaches the stomach but the tip is not included in the field-of-view. There is a right internal jugular central line, the tip projects over the expected location of the cavoatrial junction, unchanged. There is diffuse atelectasis and interval increase in interstitial opacities centrally with vascular distention. Heart size appears to be normal. The osseous structures are unremarkable. No pneumothorax is identified.      Impression: 1. Increasing interstitial opacities and vascular congestion likely related to mild pulmonary edema and volume overload. No lung consolidation is identified and there is no pneumothorax. Stable position of multiple life-support lines. This report was finalized on 04/23/2023 08:18 by Dr. Elijah Grover MD.     Personal review of imaging: CXR shows Reviewed current imaging study.  It showed increased interstitial opacities and vascular congestion likely from pulm edema.  No lung consolidation.  Tubes and lines are in position.      Pulmonary Assessment:  1. Acute on chronic hypoxic respiratory failure on mechanical ventilation orally intubated  2. Chronic congestive diastolic heart failure  3. Pulmonary infiltrate likely pneumonia improved  4. Atrial fibrillation  5. COPD with history of tobacco abuse  6. Meningioma s/p craniotomy, cranioplasty and flap infection.    7. Encephalopathy  8. Hypertension  9. Diabetes mellitus insulin-dependent  10. Coronary artery disease  11. Obesity likely obstructive sleep apnea  12. Seizure disorder  13. Chronic pain    Recommend/plan:   · Patient was seen in follow-up visit in CCU.  Chart reviewed current events noted.  · Currently he is on assist-control volume control ventilation.  Pressure control ventilation mode changed to AC/VC  · Tidal volume 500, rate 12, PEEP 5, FiO2 30%, oxygen saturation 99%, no ventilator dyssynchrony.  · Continue current ventilator settings.  Patient failed spontaneous breathing trial.   We will try again when he is more stable  · Still encephalopathic and not opening eyes and not following commands.  He is off sedation  · Tracheostomy and PEG tube placement has been requested by Dr. Portillo.  · He will probably need to go to long-term acute care to continue current care plan.  · Continue diuretic and monitor renal function and lites.  · DVT and stress ulcer prophylaxis.  Pain and anxiety control.  Nutritional support.  · Off pressors now.  Penile discharge noted cultures sent negative so far.  · On antibiotics.  Postoperative wound care for craniotomy per neurosurgery  · Repeat labs and imaging studies from time to time.  · Code status: Full.  Overall prognosis: Guarded  · We will follow    This visit was performed by both a physician and an [facility] RN.  I personally evaluated and examined the patient.  I performed all aspects of the medical decision making as documented.    Electronically signed by     Tracy Gupta MD,  Pulmonologist/Intensivist   4/24/2023, 12:01 CDT

## 2023-04-24 NOTE — PROGRESS NOTES
RT EQUIPMENT DEVICE RELATED - SKIN ASSESSMENT    RT Medical Equipment/Device:     ETT Martin/Anchorfast    Skin Assessment:      Cheek:  Intact  Lips:  Intact  Mouth:  Intact    Device Skin Pressure Protection:  Skin-to-device areas padded:  Anchorfast    Nurse Notification:  Kylie Moseley, RRT

## 2023-04-24 NOTE — PROGRESS NOTES
Infectious Diseases Progress Note    Patient:  Elijah Escobar  YOB: 1955  MRN: [id number]   Admit date: 3/31/2023   Admitting Physician: Flaco Portillo, *  Primary Care Physician: Brianna Martinez APRN    Chief Complaint/Interval History: He is without fever.  Heart rate and blood pressure stable.  He is on 30% FiO2.  Oxygen saturation in the mid to high 90s.    Intake/Output Summary (Last 24 hours) at 4/24/2023 1544  Last data filed at 4/24/2023 1400  Gross per 24 hour   Intake 3026.74 ml   Output 1925 ml   Net 1101.74 ml     Allergies: No Known Allergies  Current Scheduled Medications:   aspirin, 81 mg, Oral, Daily  atorvastatin, 20 mg, Oral, Nightly  budesonide, 0.5 mg, Nebulization, BID - RT  Chlorhexidine Gluconate Cloth, 1 application, Topical, Q24H  digoxin, 250 mcg, Oral, Daily  dilTIAZem, 90 mg, Oral, Q8H  furosemide, 40 mg, Intravenous, BID  gabapentin, 300 mg, Oral, Q8H  insulin detemir, 25 Units, Subcutaneous, Q12H  insulin lispro, 0-14 Units, Subcutaneous, Q6H  ipratropium, 0.5 mg, Nebulization, 4x Daily - RT  levETIRAcetam, 500 mg, Oral, BID  levoFLOXacin, 500 mg, Oral, Q24H  methylnaltrexone, 12 mg, Subcutaneous, Every Other Day  metoclopramide, 10 mg, Oral, 4x Daily AC & at Bedtime  metoprolol tartrate, 50 mg, Oral, Q12H  multivitamin with minerals, 1 tablet, Oral, Daily  pantoprazole, 40 mg, Intravenous, Q AM  polyethylene glycol, 17 g, Oral, Daily  sennosides-docusate, 1 tablet, Oral, Daily  sodium chloride, 10 mL, Intravenous, Q12H  sucralfate, 1 g, Oral, 4x Daily AC & at Bedtime      Current PRN Medications:  •  acetaminophen **OR** acetaminophen **OR** acetaminophen  •  butalbital-acetaminophen-caffeine  •  calcium carbonate  •  dextrose  •  dextrose  •  glucagon (human recombinant)  •  hydrOXYzine  •  ipratropium  •  ipratropium-albuterol  •  Morphine  •  nicotine  •  ondansetron **OR** ondansetron  •  oxyCODONE-acetaminophen  •  oxyCODONE-acetaminophen  •  sodium  "chloride  •  sodium chloride    Review of Systems unobtainable from patient due to mental status    Vital Signs:  /83   Pulse 91   Temp 98.3 °F (36.8 °C) (Axillary)   Resp 18   Ht 177.8 cm (70\")   Wt 125 kg (274 lb 11.2 oz)   SpO2 94%   BMI 39.42 kg/m²     Physical Exam  Vital signs - reviewed.  Line/IV site - No erythema, warmth, induration, or tenderness.  Cranial incision site without swelling, drainage, or warmth.  Lungs without crackles or wheezes.    Lab Results:  CBC:   Results from last 7 days   Lab Units 04/23/23  2354 04/19/23  0657 04/18/23  0413   WBC 10*3/mm3 10.38 13.61* 16.70*   HEMOGLOBIN g/dL 10.0* 10.2* 10.2*   HEMATOCRIT % 33.1* 33.8* 33.4*   PLATELETS 10*3/mm3 245 163 203     BMP:  Results from last 7 days   Lab Units 04/24/23  0225 04/23/23  0321 04/22/23  0256 04/21/23  0223 04/19/23  0657 04/18/23  0233   SODIUM mmol/L 139 140 139 138 138 141   POTASSIUM mmol/L 3.8 4.2 3.8 3.7 4.2 4.6   CHLORIDE mmol/L 99 99 102 100 101 103   CO2 mmol/L 30.0* 29.0 28.0 28.0 24.0 28.0   BUN mg/dL 33* 35* 29* 21 16 15   CREATININE mg/dL 1.11 1.13 1.15 1.04 1.26 1.49*   GLUCOSE mg/dL 231* 263* 232* 265* 139* 70   CALCIUM mg/dL 8.3* 8.2* 7.8* 7.9* 7.7* 7.9*   ALT (SGPT) U/L 20 26 24 32 60* 106*     Culture Results:   Blood Culture   Date Value Ref Range Status   04/19/2023 No growth at 5 days  Final   04/19/2023 No growth at 5 days  Final     Urine Culture   Date Value Ref Range Status   04/19/2023 No growth  Final     Radiology: None  Additional Studies Reviewed: None    Impression:   1.  Prior infection at cranioplasty site with Serratia-received extended course of IV treatment.  Continue treatment with levofloxacin.  2.  Respiratory failure following episode of PEA-extubation at present seems more based on mental status than on pulmonary function.  He seems improved overall.  3.  Mental status-Per review of notes has had some grimacing.  Not responding to me currently.    Recommendations: "   Continue levofloxacin  No new recommendations at present  Continue to follow    Edwin Jeffers MD

## 2023-04-25 ENCOUNTER — ANESTHESIA EVENT (OUTPATIENT)
Dept: PERIOP | Facility: HOSPITAL | Age: 68
DRG: 3 | End: 2023-04-25
Payer: MEDICARE

## 2023-04-25 ENCOUNTER — ANESTHESIA (OUTPATIENT)
Dept: PERIOP | Facility: HOSPITAL | Age: 68
DRG: 3 | End: 2023-04-25
Payer: MEDICARE

## 2023-04-25 PROCEDURE — 25010000002 FENTANYL CITRATE (PF) 100 MCG/2ML SOLUTION: Performed by: NURSE ANESTHETIST, CERTIFIED REGISTERED

## 2023-04-25 PROCEDURE — 25010000002 CEFAZOLIN PER 500 MG: Performed by: NURSE ANESTHETIST, CERTIFIED REGISTERED

## 2023-04-25 RX ORDER — CEFAZOLIN SODIUM 1 G/3ML
INJECTION, POWDER, FOR SOLUTION INTRAMUSCULAR; INTRAVENOUS AS NEEDED
Status: DISCONTINUED | OUTPATIENT
Start: 2023-04-25 | End: 2023-04-25 | Stop reason: SURG

## 2023-04-25 RX ORDER — VECURONIUM BROMIDE 1 MG/ML
INJECTION, POWDER, LYOPHILIZED, FOR SOLUTION INTRAVENOUS AS NEEDED
Status: DISCONTINUED | OUTPATIENT
Start: 2023-04-25 | End: 2023-04-25 | Stop reason: SURG

## 2023-04-25 RX ORDER — FENTANYL CITRATE 50 UG/ML
INJECTION, SOLUTION INTRAMUSCULAR; INTRAVENOUS AS NEEDED
Status: DISCONTINUED | OUTPATIENT
Start: 2023-04-25 | End: 2023-04-25 | Stop reason: SURG

## 2023-04-25 RX ORDER — SODIUM CHLORIDE, SODIUM LACTATE, POTASSIUM CHLORIDE, CALCIUM CHLORIDE 600; 310; 30; 20 MG/100ML; MG/100ML; MG/100ML; MG/100ML
INJECTION, SOLUTION INTRAVENOUS CONTINUOUS PRN
Status: DISCONTINUED | OUTPATIENT
Start: 2023-04-25 | End: 2023-04-25 | Stop reason: SURG

## 2023-04-25 RX ADMIN — VECURONIUM BROMIDE 5 MG: 1 INJECTION, POWDER, LYOPHILIZED, FOR SOLUTION INTRAVENOUS at 12:59

## 2023-04-25 RX ADMIN — SODIUM CHLORIDE, POTASSIUM CHLORIDE, SODIUM LACTATE AND CALCIUM CHLORIDE: 600; 310; 30; 20 INJECTION, SOLUTION INTRAVENOUS at 11:53

## 2023-04-25 RX ADMIN — VECURONIUM BROMIDE 5 MG: 1 INJECTION, POWDER, LYOPHILIZED, FOR SOLUTION INTRAVENOUS at 11:57

## 2023-04-25 RX ADMIN — FENTANYL CITRATE 100 MCG: 50 INJECTION, SOLUTION INTRAMUSCULAR; INTRAVENOUS at 11:57

## 2023-04-25 RX ADMIN — CEFAZOLIN 2 G: 1 INJECTION, POWDER, FOR SOLUTION INTRAMUSCULAR; INTRAVENOUS at 12:00

## 2023-04-25 NOTE — PROGRESS NOTES
[facility]  INPATIENT PROGRESS NOTE    Patient Name: Elijah Escobar  Date of Admission: 3/31/2023   Today's Date: 04/25/23  Length of Stay: 25  Primary Care Physician: Brianna Martinez APRN    Subjective   Chief Complaint: Consultation for medical management  HPI     The patient has undergone successful trach placement and PEG tube placement.  The patient had a brief period of hypoxia requiring an increase in FiO2 shortly after arriving to the CCU.  Pulmonology evaluated the patient.  Chest x-ray was obtained and bibasilar atelectasis was noted.  PEEP settings were changed.  Blood sugar is well controlled in the 130 range after tube feeding changes were made by nutrition.  The patient's son and sister were present in the room at the time of my evaluation.  They had several questions regarding the patient's condition and potential for recovery.  These were all answered to the best of my ability.  It appears that family may be considering de-escalation of care.  To that end, palliative care will be consulted for discussion of goals of care with the family based on their request.    Review of Systems   All pertinent negatives and positives are as above. All other systems have been reviewed and are negative unless otherwise stated.     Objective    Temp:  [98.3 °F (36.8 °C)-99 °F (37.2 °C)] 98.3 °F (36.8 °C)  Heart Rate:  [] 95  Resp:  [14-26] 14  BP: (108-149)/(66-95) 146/84  FiO2 (%):  [30 %-40 %] 40 %  Physical Exam  Constitutional:       General: He is unresponsive to verbal stimulus.  Minimally responsive to tactile stimulus and pain.     Appearance: He is ill-appearing.      Interventions: He is sedated and intubated.      Comments: Unable to follow commands.  Does not respond to verbal stimulus.   HENT:      Head: Normocephalic.      Comments: Postop incision healing well.     Right Ear: External ear normal.      Left Ear: External ear normal.       Mouth: Mucous membranes are dry.      Pharynx: Oropharynx is clear.      Comments: ET tube and OG tube present and secured appropriately.  Eyes:      General: No scleral icterus.     Conjunctiva/sclera: Conjunctivae normal.   Cardiovascular:      Rate and Rhythm: Normal rate. Rhythm irregularly irregular.      Pulses: Normal pulses.      Heart sounds: Normal heart sounds. No murmur heard.  Pulmonary:      Effort: He is intubated.      Breath sounds: Decreased breath sounds present.   Abdominal:      General: Bowel sounds are normal.      Palpations: Abdomen is soft. There is no mass.   Musculoskeletal:      Right lower leg: No edema.      Left lower leg: No edema.   Skin:     General: Skin is warm and dry.   Neurological:      Comments: Does not follow commands.     Results Review:  I have reviewed the labs, radiology results, and diagnostic studies.    Laboratory Data:   Results from last 7 days   Lab Units 04/23/23  2354 04/19/23  0657   WBC 10*3/mm3 10.38 13.61*   HEMOGLOBIN g/dL 10.0* 10.2*   HEMATOCRIT % 33.1* 33.8*   PLATELETS 10*3/mm3 245 163        Results from last 7 days   Lab Units 04/25/23  0236 04/24/23  0225 04/23/23  0321   SODIUM mmol/L 136 139 140   POTASSIUM mmol/L 3.8 3.8 4.2   CHLORIDE mmol/L 96* 99 99   CO2 mmol/L 32.0* 30.0* 29.0   BUN mg/dL 30* 33* 35*   CREATININE mg/dL 1.03 1.11 1.13   CALCIUM mg/dL 8.2* 8.3* 8.2*   BILIRUBIN mg/dL 0.3 0.3 0.3   ALK PHOS U/L 115 106 109   ALT (SGPT) U/L 20 20 26   AST (SGOT) U/L 18 15 20   GLUCOSE mg/dL 199* 231* 263*       Culture Data:   Blood Culture   Date Value Ref Range Status   04/19/2023 No growth at 5 days  Final   04/19/2023 No growth at 5 days  Final     Urine Culture   Date Value Ref Range Status   04/19/2023 No growth  Final       Radiology Data:   Imaging Results (Last 24 Hours)     Procedure Component Value Units Date/Time    XR Chest 1 View [[id number]] Collected: 04/25/23 0721     Updated: 04/25/23 0724    Narrative:      HISTORY: Intubated      CXR: A frontal view the chest obtained     COMPARISON: 04/24/2023     FINDINGS: Endotracheal tube remains appropriate in position. Enteric  tube descends into the abdomen. Right IJ central line tip projects near  the atrial caval junction. Similar diffuse bilateral pulmonary opacities  with likely small layering pleural effusions. No pneumothorax. Stable  mediastinal contours.       Impression:      1. Stable 1 day view the chest.  This report was finalized on 04/25/2023 07:21 by Dr. Coleen Terry MD.          I have reviewed the patient's current medications.     Assessment/Plan   Assessment  Active Hospital Problems    Diagnosis    • **Swelling of scalp    • Elevated liver function tests    • COPD (chronic obstructive pulmonary disease)    • Obesity (BMI 30-39.9)    • Tobacco abuse, in remission    • Acute systolic heart failure    • ADDIE (obstructive sleep apnea)    • Type 2 myocardial infarction due to arrhythmia    • Acute pulmonary edema due to A-fib RVR    • Acute respiratory failure with hypoxia    • Intracranial epidural hematoma    • Post-operative infection    • Paroxysmal atrial fibrillation with rapid ventricular response    • Type 2 diabetes mellitus with hyperglycemia and peripheral neuropathy, with long-term current use of insulin (HCC)    • Coronary artery disease    • Meningioma s/p craniotomy        Treatment Plan  Continue long-acting insulin  Ventilator/critical care management per pulmonology  Tube feedings to be initiated per general surgery's recommendation  Would recommend pursuing LTAC placement  Palliative care consultation to discuss goals of treatment with family    Medical Decision Making  Number and Complexity of problems:   1) exacerbation of respiratory failure with hypoxia secondary to pulmonary edema, acute, high complexity  2) PAF with RVR, acute, moderate complexity  3) meningioma versus intracranial infection, acute, high complexity  4) type 2 diabetes, chronic, moderate  complexity  5) elevated liver enzymes acute moderate complexity  6) elevated WBC acute moderate complexity  Differential Diagnosis: Pulmonary embolus, LV dysfunction     Conditions and Status        Condition is unchanged.     MDM Data  External documents reviewed: No new data  Cardiac tracing (EKG, telemetry) interpretation: A-fib rate controlled  Radiology interpretation: Chest x-ray has increased interstitial markings consistent with fluid overload.  Labs reviewed:  reviewed  Any tests that were considered but not ordered: None     Decision rules/scores evaluated (example [id number], Wells, etc): [id number] score of 5 with a 7.2% risk of stroke annually     Discussed with:  nursing      Care Planning  Shared decision making: Patient is sedated  Code status and discussions: full     Disposition  Social Determinants of Health that impact treatment or disposition: none  I expect the patient to be discharged by the primary service.    Electronically signed by Ramin Singh DO, 04/25/23, 14:09 CDT.    I spent 50 minutes providing critical care management to this patient. This excludes time spent in performing separately billed procedures.

## 2023-04-25 NOTE — THERAPY TREATMENT NOTE
Acute Care - Physical Therapy Treatment Note  [facility]     Patient Name: Elijah Escobar  : 1955  MRN: [id number]  Today's Date: 2023      Visit Dx:     ICD-10-CM ICD-9-CM   1. Intracranial epidural hematoma  S06.4X0A 852.40   2. Impaired mobility  Z74.09 799.89   3. S/P craniotomy  Z98.890 V45.89   4. Infection of deep incisional surgical site after procedure, initial encounter  T81.42XA 998.59   5. Meningioma s/p craniotomy  D32.9 225.2   6. Type 2 diabetes mellitus with hyperglycemia and peripheral neuropathy, with long-term current use of insulin (HCC)  E11.65 250.00    Z79.4 790.29     V58.67   7. Paroxysmal atrial fibrillation with rapid ventricular response  I48.0 427.31   8. Swelling of scalp  R22.0 784.2   9. Acute pulmonary edema due to A-fib RVR  J81.0 518.4   10. Acute respiratory failure with hypoxia  J96.01 518.81   11. Type 2 myocardial infarction due to arrhythmia  I49.9 427.9    I21.A1 410.90   12. Obesity (BMI 30-39.9)  E66.9 278.00   13. Tobacco abuse, in remission  F17.201 305.1   14. Acute systolic heart failure  I50.21 428.21   15. ADDIE (obstructive sleep apnea)  G47.33 327.23   16. Secondary hypertension  I15.9 405.99   17. Gastroesophageal reflux disease, unspecified whether esophagitis present  K21.9 530.81   18. Class 2 severe obesity due to excess calories with serious comorbidity and body mass index (BMI) of 38.0 to 38.9 in adult  E66.01 278.01    Z68.38 V85.38   19. Leukocytosis, unspecified type  D72.829 288.60   20. Anemia due to other cause, not classified  D64.89 285.8   21. Smoker  F17.200 305.1   22. History of cranioplasty  Z98.890 V45.89   23. Facial edema  R60.0 782.3   24. Coronary artery disease due to lipid rich plaque  I25.10 414.00    I25.83 414.3   25. Postoperative infection, unspecified type, initial encounter  T81.40XA 998.59   26. Chronic obstructive pulmonary disease, unspecified COPD type  J44.9 496   27. Respiratory insufficiency  R06.89 786.09      Patient Active Problem List   Diagnosis   • Meningioma s/p craniotomy   • Hypertension   • GERD (gastroesophageal reflux disease)   • Coronary artery disease   • Class 2 severe obesity due to excess calories with serious comorbidity and body mass index (BMI) of 38.0 to 38.9 in adult   • Type 2 diabetes mellitus with hyperglycemia and peripheral neuropathy, with long-term current use of insulin (HCC)   • Leukocytosis   • Other specified anemias   • Paroxysmal atrial fibrillation with rapid ventricular response   • Post-operative infection   • Smoker   • History of cranioplasty   • Facial edema   • Swelling of scalp   • Acute pulmonary edema due to A-fib RVR   • Acute respiratory failure with hypoxia   • Type 2 myocardial infarction due to arrhythmia   • COPD (chronic obstructive pulmonary disease)   • Obesity (BMI 30-39.9)   • Tobacco abuse, in remission   • Acute systolic heart failure   • ADDIE (obstructive sleep apnea)   • Intracranial epidural hematoma   • Elevated liver function tests     Past Medical History:   Diagnosis Date   • Brain tumor    • Coronary artery disease    • COVID-19 vaccine series completed     MODERNA X 3; LAST DOSE 3/2022   • Diabetes    • Erectile dysfunction    • GERD (gastroesophageal reflux disease)    • Hypercholesteremia    • Hypertension    • Seizure      Past Surgical History:   Procedure Laterality Date   • BALLOON ANGIOPLASTY, ARTERY Right    • COLONOSCOPY     • CRANIECTOMY Right 7/1/2022    Procedure: CRANIECTOMY WITH INCISIONAL WASHOUT;  Surgeon: Felipe Banerjee MD;  Location:  [facility];  Service: Neurosurgery;  Laterality: Right;   • CRANIOPLASTY Right 10/4/2022    Procedure: CRANIOPLASTY right with Lumbar drain;  Surgeon: Flaco Portillo MD;  Location:  [facility];  Service: Neurosurgery;  Laterality: Right;   • CRANIOPLASTY N/A 4/4/2023    Procedure: CRANIOPLASTY, removal, right, horseshoe;  Surgeon: Flaco Portillo MD;  Location:  [facility];  Service:  Neurosurgery;  Laterality: N/A;   • CRANIOTOMY Right 4/12/2023    Procedure: CRANIOTOMY; evacuation of right hematoma;  Surgeon: Flaco Portillo MD;  Location:  [facility];  Service: Neurosurgery;  Laterality: Right;   • CRANIOTOMY FOR TUMOR Right 6/16/2022    Procedure: CRANIOTOMY FOR TUMOR STERIOTACTIC WITH BRAIN LAB, right;  Surgeon: Flaco Portillo MD;  Location:  [facility];  Service: Neurosurgery;  Laterality: Right;     PT Assessment (last 12 hours)     PT Evaluation and Treatment     Row Name 04/25/23 0820          Physical Therapy Time and Intention    Subjective Information no complaints  Vent  -TB     Document Type therapy note (daily note)  -TB     Mode of Treatment physical therapy  -TB     Row Name 04/25/23 0820          General Information    Existing Precautions/Restrictions fall;oxygen therapy device and L/min  Vent, BUE restraints  -TB     Row Name 04/25/23 0820          Motor Skills    Therapeutic Exercise --  PROM BUE and BLE  -TB     Row Name             Wound 04/04/23 1111 Right temporal region Incision    Wound - Properties Group Placement Date: 04/04/23  -CHRISTIANO Placement Time: 1111  -CHRISTIANO Side: Right  -CHRISTIANO Location: temporal region  -CHRISTIANO Primary Wound Type: Incision  -CHRISTIANO    Retired Wound - Properties Group Placement Date: 04/04/23  -CHRISTIANO Placement Time: 1111  -CHRISTIANO Side: Right  -CHRISTIANO Location: temporal region  -CHRISTIANO Primary Wound Type: Incision  -CHRISTIANO    Retired Wound - Properties Group Date first assessed: 04/04/23  -CHRISTIANO Time first assessed: 1111  -CHRISTIANO Side: Right  -CHRISTIANO Location: temporal region  -CHRISTIANO Primary Wound Type: Incision  -CHRISTIANO    Row Name             Wound 04/12/23 1622 Right scalp Incision    Wound - Properties Group Placement Date: 04/12/23  -SAFIA Placement Time: 1622  -SAFIA Side: Right  -SAFIA Location: scalp  -SAFIA Primary Wound Type: Incision  -SAFIA    Retired Wound - Properties Group Placement Date: 04/12/23  -SAFIA Placement Time: 1622  -SAFIA Side: Right  -SAFIA Location: scalp  -SAFIA Primary Wound Type:  Incision  -SAFIA    Retired Wound - Properties Group Date first assessed: 04/12/23  -SAFIA Time first assessed: 1622  -SAFIA Side: Right  -SAFIA Location: scalp  -SAFIA Primary Wound Type: Incision  -SAFIA    Row Name 04/25/23 0820          Plan of Care Review    Plan of Care Reviewed With patient  -TB     Progress no change  -TB     Outcome Evaluation Pt remains on the vent. Opened his eyes a few times when his named was called out. Still no command following or tracking with his eyes. Performed PROM BLE and BUE. Will cont POC.  -TB     Row Name 04/25/23 0820          Positioning and Restraints    Pre-Treatment Position in bed  -TB     Post Treatment Position bed  -TB     In Bed fowlers;patient within staff view;RUE elevated;LUE elevated;SCD pump applied;legs elevated;heels elevated  -TB     Restraints released:;reapplied:  -TB           User Key  (r) = Recorded By, (t) = Taken By, (c) = Cosigned By    Initials Name Provider Type    Kierra Conroy RN Registered Nurse    Ricky George RN Registered Nurse    Tere Berger, KYLAH Physical Therapist Assistant                Physical Therapy Education     Title: PT OT SLP Therapies (Done)     Topic: Physical Therapy (Done)     Point: Mobility training (Done)     Learning Progress Summary           Patient Acceptance, E,TB, VU by CR at 4/25/2023 0209    Acceptance, E,TB, VU by CR at 4/24/2023 0041    Acceptance, E, NR by JUAN at 4/11/2023 0809    Comment: Looking ahead during gait    Acceptance, E,D, DU,VU by JUAN at 4/10/2023 1447    Comment: Safety with transfers, please take time, no impulsiveness    Acceptance, E,TB, VU by CM at 4/6/2023 1350    Acceptance, E, VU by SB at 4/5/2023 1605    Comment: helmet fit, safety, POC    Acceptance, E, VU by CHRISTIANO at 4/3/2023 1353    Comment: gait, safety,    Acceptance, E, VU by BRYSON at 4/1/2023 1405    Comment: pt edu on POC, benefits of act, PLB and d/c plans   Family Acceptance, E,TB, VU by CR at 4/25/2023 0209    Acceptance, E,TB,  VU by CR at 4/24/2023 0041    Acceptance, E,TB, VU by CM at 4/6/2023 1350                   Point: Home exercise program (Done)     Learning Progress Summary           Patient Acceptance, E,TB, VU by CR at 4/25/2023 0209    Acceptance, E,TB, VU by CR at 4/24/2023 0041    Acceptance, E, NR by JESSICA at 4/19/2023 0815    Comment: progression of PT    Acceptance, E,TB, VU by CM at 4/6/2023 1350   Family Acceptance, E,TB, VU by CR at 4/25/2023 0209    Acceptance, E,TB, VU by CR at 4/24/2023 0041    Acceptance, E,TB, VU by CM at 4/6/2023 1350                   Point: Body mechanics (Done)     Learning Progress Summary           Patient Acceptance, E,TB, VU by CR at 4/25/2023 0209    Acceptance, E,TB, VU by CR at 4/24/2023 0041    Acceptance, E,TB, VU by CM at 4/6/2023 1350   Family Acceptance, E,TB, VU by CR at 4/25/2023 0209    Acceptance, E,TB, VU by CR at 4/24/2023 0041    Acceptance, E,TB, VU by CM at 4/6/2023 1350                   Point: Precautions (Done)     Learning Progress Summary           Patient Acceptance, E,TB, VU by CR at 4/25/2023 0209    Acceptance, E,TB, VU by CR at 4/24/2023 0041    Acceptance, E,TB, VU by CM at 4/6/2023 1350    Acceptance, E, VU by SB at 4/5/2023 1605    Comment: helmet fit, safety, POC    Acceptance, E, VU by SB at 4/1/2023 1405    Comment: pt edu on POC, benefits of act, PLB and d/c plans   Family Acceptance, E,TB, VU by CR at 4/25/2023 0209    Acceptance, E,TB, VU by CR at 4/24/2023 0041    Acceptance, E,TB, VU by CM at 4/6/2023 1350                               User Key     Initials Effective Dates Name Provider Type Discipline    JESSICA 02/03/23 -  Bruce Pillai, PT DPT Physical Therapist PT    JUAN 02/03/23 -  Carrie Callaway, PTA Physical Therapist Assistant PT    CHRISTIANO 02/03/23 -  Александр Saravia, PTA Physical Therapist Assistant PT    CM 03/02/23 -  Dianeyls Carlton, RN Registered Nurse Nurse    SB 06/16/21 -  Erika Javier, BELEN DPT Physical Therapist PT    CR 03/03/23 -   Janice Singh, RN Registered Nurse Nurse              PT Recommendation and Plan     Plan of Care Reviewed With: patient  Progress: no change  Outcome Evaluation: Pt remains on the vent. Opened his eyes a few times when his named was called out. Still no command following or tracking with his eyes. Performed PROM BLE and BUE. Will cont POC.   Outcome Measures     Row Name 04/25/23 0820 04/24/23 0830 04/23/23 0840       How much help from another person do you currently need...    Turning from your back to your side while in flat bed without using bedrails? 1  -TB 1  -TB 1  -LY    Moving from lying on back to sitting on the side of a flat bed without bedrails? 1  -TB 1  -TB 1  -LY    Moving to and from a bed to a chair (including a wheelchair)? 1  -TB 1  -TB 1  -LY    Standing up from a chair using your arms (e.g., wheelchair, bedside chair)? 1  -TB 1  -TB 1  -LY    Climbing 3-5 steps with a railing? 1  -TB 1  -TB 1  -LY    To walk in hospital room? 1  -TB 1  -TB 1  -LY    AM-PAC 6 Clicks Score (PT) 6  -TB 6  -TB 6  -LY       Functional Assessment    Outcome Measure Options AM-PAC 6 Clicks Basic Mobility (PT)  -TB AM-PAC 6 Clicks Basic Mobility (PT)  -TB AM-PAC 6 Clicks Basic Mobility (PT)  -LY          User Key  (r) = Recorded By, (t) = Taken By, (c) = Cosigned By    Initials Name Provider Type    Tere Berger PTA Physical Therapist Assistant    Cary Martinez, PTA Physical Therapist Assistant                 Time Calculation:    PT Charges     Row Name 04/25/23 1122             Time Calculation    Start Time 0820  -TB      Stop Time 0845  -TB      Time Calculation (min) 25 min  -TB      PT Received On 04/25/23  -TB         Time Calculation- PT    Total Timed Code Minutes- PT 25 minute(s)  -TB            User Key  (r) = Recorded By, (t) = Taken By, (c) = Cosigned By    Initials Name Provider Type    Tere Berger, PTA Physical Therapist Assistant              Therapy Charges for Today      Code Description Service Date Service Provider Modifiers Qty    18667584365 HC PT THER PROC EA 15 MIN 4/24/2023 Tere Miller, [place]LAH GP 2    80073139445 HC PT THER PROC EA 15 MIN 4/25/2023 Tere Miller, [place]LAH GP 2          PT G-Codes  Outcome Measure Options: AM-PAC 6 Clicks Basic Mobility (PT)  AM-PAC 6 Clicks Score (PT): 6  AM-PAC 6 Clicks Score (OT): 20    Tere Miller PTA  4/25/2023

## 2023-04-25 NOTE — CONSULTS
Geeta Abdalla MD Consult Note - General Surgery     Referring Provider: Flaco Portillo,*    Patient Care Team:  Brianna Martinez APRN as PCP - General (Nurse Practitioner)  Rahul Arnold APRN as Nurse Practitioner (Nurse Practitioner)  Flaco Portillo MD as Surgeon (Neurosurgery)    Chief complaint need for enteral access    Subjective .     History of present illness:  Mr. Escobar is a 67 year old male with a history of craniotomy for tumor 6/16/22, craniotomy for washout 7/1/22, craniectomy 10/4/22, craniectomy and washout on 4/4/23. He had post op code x 2 and has required prolonged intubation. Plan for trach and PEG. He is tolerating goal tube feeds. He has never had abdominal surgery documented in his chart and does not have definite surgical scars on his abdomen. HE is not on blood thinners. BMI is 39.     Review of Systems  Unable to be obtained as patient is intubated and sedated.       History  Past Medical History:   Diagnosis Date   • Brain tumor    • Coronary artery disease    • COVID-19 vaccine series completed     MODERNA X 3; LAST DOSE 3/2022   • Diabetes    • Erectile dysfunction    • GERD (gastroesophageal reflux disease)    • Hypercholesteremia    • Hypertension    • Seizure    ,   Past Surgical History:   Procedure Laterality Date   • BALLOON ANGIOPLASTY, ARTERY Right    • COLONOSCOPY     • CRANIECTOMY Right 7/1/2022    Procedure: CRANIECTOMY WITH INCISIONAL WASHOUT;  Surgeon: Felipe Banerjee MD;  Location:  [facility];  Service: Neurosurgery;  Laterality: Right;   • CRANIOPLASTY Right 10/4/2022    Procedure: CRANIOPLASTY right with Lumbar drain;  Surgeon: Flaco Portillo MD;  Location:  [facility];  Service: Neurosurgery;  Laterality: Right;   • CRANIOPLASTY N/A 4/4/2023    Procedure: CRANIOPLASTY, removal, right, horseshoe;  Surgeon: Flaco Portillo MD;  Location:  [facility];  Service: Neurosurgery;  Laterality: N/A;   • CRANIOTOMY Right 4/12/2023    Procedure:  CRANIOTOMY; evacuation of right hematoma;  Surgeon: Flaco Portillo MD;  Location:  [facility];  Service: Neurosurgery;  Laterality: Right;   • CRANIOTOMY FOR TUMOR Right 2022    Procedure: CRANIOTOMY FOR TUMOR STERIOTACTIC WITH BRAIN LAB, right;  Surgeon: Flaco Portillo MD;  Location:  [facility];  Service: Neurosurgery;  Laterality: Right;   ,   Family History   Problem Relation Age of Onset   • Cancer Mother         liver   • Heart disease Father    ,   Social History     Tobacco Use   • Smoking status: Former     Packs/day: 0.25     Years: 15.00     Pack years: 3.75     Types: Cigarettes     Quit date: 3/15/2023     Years since quittin.1   • Smokeless tobacco: Never   Vaping Use   • Vaping Use: Never used   Substance Use Topics   • Alcohol use: Never   • Drug use: Never   ,   Medications Prior to Admission   Medication Sig Dispense Refill Last Dose   • amLODIPine (NORVASC) 10 MG tablet Take 1 tablet by mouth Daily.      • aspirin 81 MG EC tablet Take 1 tablet by mouth Daily.      • atorvastatin (LIPITOR) 20 MG tablet Take 1 tablet by mouth Every Night.      • gabapentin (NEURONTIN) 300 MG capsule Take 1 capsule by mouth 3 (Three) Times a Day.      • insulin detemir (LEVEMIR) 100 UNIT/ML injection Inject 20-35 Units under the skin into the appropriate area as directed 2 (Two) Times a Day. 20u am & 35u hs      • metoprolol tartrate (LOPRESSOR) 50 MG tablet Take 1 tablet by mouth 2 (Two) Times a Day With Meals.      • omeprazole (priLOSEC) 20 MG capsule Take 1 capsule by mouth Daily.      • sotalol (BETAPACE) 120 MG tablet Take 1 tablet by mouth 2 (Two) Times a Day.      • acetaminophen (TYLENOL) 325 MG tablet Take 2 tablets by mouth Every 4 (Four) Hours As Needed for Mild Pain .   Unknown   • HYDROcodone-acetaminophen (NORCO)  MG per tablet Take 1 tablet by mouth 4 (Four) Times a Day As Needed for Moderate Pain.      • levETIRAcetam (KEPPRA) 500 MG tablet Take 1 tablet by mouth 2 (Two)  Times a Day.       and Allergies:  Patient has no known allergies.    Current Facility-Administered Medications:   •  acetaminophen (TYLENOL) tablet 650 mg, 650 mg, Oral, Q4H PRN, 650 mg at 04/15/23 1152 **OR** acetaminophen (TYLENOL) 160 MG/5ML solution 650 mg, 650 mg, Oral, Q4H PRN, 650 mg at 04/18/23 2259 **OR** acetaminophen (TYLENOL) suppository 650 mg, 650 mg, Rectal, Q4H PRN, Rahul Arnold APRN  •  aspirin chewable tablet 81 mg, 81 mg, Oral, Daily, RusRahul farooq APRN, 81 mg at 04/24/23 0853  •  atorvastatin (LIPITOR) tablet 20 mg, 20 mg, Oral, Nightly, Rahul Arnold APRN, 20 mg at 04/23/23 2028  •  budesonide (PULMICORT) nebulizer solution 0.5 mg, 0.5 mg, Nebulization, BID - RT, Ramin Singh DO, 0.5 mg at 04/24/23 0600  •  butalbital-acetaminophen-caffeine (FIORICET, ESGIC) -40 MG per tablet 1 tablet, 1 tablet, Oral, Q6H PRN, Rahul Arnold APRN, 1 tablet at 04/12/23 1132  •  calcium carbonate (TUMS) chewable tablet 500 mg (200 mg elemental), 2 tablet, Oral, TID PRN, Ramin Singh DO, 2 tablet at 04/15/23 1131  •  Chlorhexidine Gluconate Cloth 2 % pads 1 application, 1 application, Topical, Q24H, Flaco Portillo MD, 1 application at 04/24/23 0426  •  dextrose (D50W) (25 g/50 mL) IV injection 25 g, 25 g, Intravenous, Q15 Min PRN, Rahul Arnold APRN, 25 g at 04/13/23 2121  •  dextrose (GLUTOSE) oral gel 15 g, 15 g, Oral, Q15 Min PRN, Rahul Arnold APRN  •  digoxin (LANOXIN) tablet 250 mcg, 250 mcg, Oral, Daily, Rahul Arnold APRN, 250 mcg at 04/24/23 1207  •  dilTIAZem (CARDIZEM) tablet 90 mg, 90 mg, Oral, Q8H, Susanne Milligan, 90 mg at 04/24/23 1727  •  furosemide (LASIX) injection 40 mg, 40 mg, Intravenous, BID, Ramin iSngh DO, 40 mg at 04/24/23 1724  •  gabapentin (NEURONTIN) 50 mg/mL solution 300 mg, 300 mg, Oral, Q8H, Ivett Ortega APRN, 300 mg at 04/24/23 1723  •  glucagon (human recombinant) (GLUCAGEN DIAGNOSTIC) injection 1 mg, 1 mg, Intramuscular,  Q15 Min PRN, Rahul Arnold APRN  •  hydrOXYzine (ATARAX) tablet 25 mg, 25 mg, Oral, TID PRN, Ramin Singh DO, 25 mg at 04/16/23 1139  •  insulin detemir (LEVEMIR) injection 25 Units, 25 Units, Subcutaneous, Q12H, Susanne Milligan, 25 Units at 04/24/23 0914  •  Insulin Lispro (humaLOG) injection 0-14 Units, 0-14 Units, Subcutaneous, Q6H, Tj Steve MD, 3 Units at 04/24/23 1733  •  ipratropium (ATROVENT) nebulizer solution 0.5 mg, 0.5 mg, Nebulization, 4x Daily PRN, Ramin Singh DO  •  ipratropium (ATROVENT) nebulizer solution 0.5 mg, 0.5 mg, Nebulization, 4x Daily - RT, Alia Gonsalves APRN, 0.5 mg at 04/24/23 1424  •  ipratropium-albuterol (DUO-NEB) nebulizer solution 3 mL, 3 mL, Nebulization, Q4H PRN, Efra Hector DO, 3 mL at 04/15/23 2255  •  levETIRAcetam (KEPPRA) tablet 500 mg, 500 mg, Oral, BID, Rahul Arnold APRN, 500 mg at 04/24/23 0853  •  levoFLOXacin (LEVAQUIN) tablet 500 mg, 500 mg, Oral, Q24H, Edwin Santos MD, 500 mg at 04/24/23 0923  •  methylnaltrexone (RELISTOR) injection 12 mg, 12 mg, Subcutaneous, Every Other Day, Rahul Arnold APRN, 12 mg at 04/23/23 0942  •  metoclopramide (REGLAN) tablet 10 mg, 10 mg, Oral, 4x Daily AC & at Bedtime, Ramin Singh DO, 10 mg at 04/24/23 1727  •  metoprolol tartrate (LOPRESSOR) tablet 50 mg, 50 mg, Oral, Q12H, Rahul Arnold APRN, 50 mg at 04/24/23 0853  •  morphine injection 5 mg, 5 mg, Intravenous, Q4H PRN, Ivett Ortega, APRN, 5 mg at 04/22/23 1059  •  multivitamin with minerals 1 tablet, 1 tablet, Oral, Daily, Flaco Portillo MD, 1 tablet at 04/24/23 0853  •  nicotine (NICODERM CQ) 14 MG/24HR patch 1 patch, 1 patch, Transdermal, Daily PRN, Rahul Arnold APRN  •  ondansetron (ZOFRAN) tablet 4 mg, 4 mg, Oral, Q6H PRN **OR** ondansetron (ZOFRAN) injection 4 mg, 4 mg, Intravenous, Q6H PRN, Rahul Arnold APRN, 4 mg at 04/11/23 0109  •  oxyCODONE-acetaminophen (PERCOCET)  MG per tablet 1 tablet,  1 tablet, Oral, Q4H PRN, Rahul Arnold APRN, 1 tablet at 04/16/23 0046  •  oxyCODONE-acetaminophen (PERCOCET) 7.5-325 MG per tablet 1 tablet, 1 tablet, Oral, Q4H PRN, Rahul Arnold, APRN, 1 tablet at 04/13/23 1350  •  pantoprazole (PROTONIX) injection 40 mg, 40 mg, Intravenous, Q AM, Ramin Singh DO, 40 mg at 04/24/23 0535  •  polyethylene glycol (MIRALAX) packet 17 g, 17 g, Oral, Daily, RustRahul APRN, 17 g at 04/24/23 0853  •  propofol (DIPRIVAN) infusion 10 mg/mL 100 mL, 5-50 mcg/kg/min, Intravenous, Titrated, Ramin Singh DO, Last Rate: 3.69 mL/hr at 04/23/23 0253, 5 mcg/kg/min at 04/23/23 0253  •  sennosides-docusate (PERICOLACE) 8.6-50 MG per tablet 1 tablet, 1 tablet, Oral, Daily, RusRahul farooq APRN, 1 tablet at 04/24/23 0853  •  sodium chloride 0.9 % flush 10 mL, 10 mL, Intravenous, Q12H, Rahul Arnold APRN, 10 mL at 04/24/23 0853  •  sodium chloride 0.9 % flush 10 mL, 10 mL, Intravenous, PRN, Rahul Arnold APRN  •  sodium chloride 0.9 % infusion 40 mL, 40 mL, Intravenous, PRN, Rahul Arnold APRN  •  sucralfate (CARAFATE) tablet 1 g, 1 g, Oral, 4x Daily AC & at Bedtime, Ramin Singh DO, 1 g at 04/24/23 1728    Objective     Vital Signs   Temp:  [98 °F (36.7 °C)-99.3 °F (37.4 °C)] 98.3 °F (36.8 °C)  Heart Rate:  [] 111  Resp:  [16-24] 23  BP: (113-151)/(61-98) 131/81  FiO2 (%):  [30 %] 30 %    Physical Exam:  General appearance - intubated and sedated   Mental status - unable to assess   Eyes - sclera anicteric  Neck - supple, no significant adenopathy  Chest - on vent via ETT   Heart - regular rate   Abdomen - soft, nontender, nondistended, no masses or organomegaly    Results Review:    Lab Results (last 24 hours)     Procedure Component Value Units Date/Time    POC Glucose Once [[id number]]  (Abnormal) Collected: 04/24/23 1731    Specimen: Blood Updated: 04/24/23 1742     Glucose 154 mg/dL      Comment: : [id number] Nick Jorge ID: [id number]       POC  Glucose Once [[id number]]  (Abnormal) Collected: 04/24/23 1208    Specimen: Blood Updated: 04/24/23 1220     Glucose 254 mg/dL      Comment: : [email or web address] Real Samayoaer ID: [id number]       CBC & Differential [[id number]]  (Abnormal) Collected: 04/23/23 2354    Specimen: Blood Updated: 04/24/23 0750    Narrative:      The following orders were created for panel order CBC & Differential.  Procedure                               Abnormality         Status                     ---------                               -----------         ------                     CBC Auto Differential[[id number]]        Abnormal            Final result                 Please view results for these tests on the individual orders.    CBC Auto Differential [[id number]]  (Abnormal) Collected: 04/23/23 2354    Specimen: Blood Updated: 04/24/23 0750     WBC 10.38 10*3/mm3      RBC 3.36 10*6/mm3      Hemoglobin 10.0 g/dL      Hematocrit 33.1 %      MCV 98.5 fL      MCH 29.8 pg      MCHC 30.2 g/dL      RDW 13.6 %      RDW-SD 48.1 fl      MPV 11.4 fL      Platelets 245 10*3/mm3      Neutrophil % 68.0 %      Lymphocyte % 20.6 %      Monocyte % 10.1 %      Eosinophil % 0.5 %      Basophil % 0.4 %      Immature Grans % 0.4 %      Neutrophils, Absolute 7.06 10*3/mm3      Lymphocytes, Absolute 2.14 10*3/mm3      Monocytes, Absolute 1.05 10*3/mm3      Eosinophils, Absolute 0.05 10*3/mm3      Basophils, Absolute 0.04 10*3/mm3      Immature Grans, Absolute 0.04 10*3/mm3      nRBC 0.0 /100 WBC     POC Glucose Once [[id number]]  (Abnormal) Collected: 04/24/23 0535    Specimen: Blood Updated: 04/24/23 0546     Glucose 226 mg/dL      Comment: : [id number] Francisco CamachoMeter ID: [id number]       Blood Gas, Arterial - [[id number]]  (Abnormal) Collected: 04/24/23 0347    Specimen: Arterial Blood Updated: 04/24/23 0346     Site Right Radial     Marek's Test Positive     pH, Arterial 7.512 pH units      Comment: 83 Value above reference range         pCO2, Arterial 40.8 mm Hg      pO2, Arterial 77.4 mm Hg      Comment: 84 Value below reference range        HCO3, Arterial 32.7 mmol/L      Comment: 83 Value above reference range        Base Excess, Arterial 8.9 mmol/L      Comment: 83 Value above reference range        O2 Saturation, Arterial 96.3 %      Temperature 37.0 C      Barometric Pressure for Blood Gas 754 mmHg      Modality Ventilator     FIO2 30 %      Ventilator Mode PC     Set Mech Resp Rate 10.0     PEEP 5.0     PIP 12 cmH2O      Comment: Meter: [id number]     :  [id number]        Collected by [id number]     pCO2, Temperature Corrected 40.8 mm Hg      pH, Temp Corrected 7.512 pH Units      pO2, Temperature Corrected 77.4 mm Hg     Comprehensive Metabolic Panel [[id number]]  (Abnormal) Collected: 04/24/23 0225    Specimen: Blood Updated: 04/24/23 0318     Glucose 231 mg/dL      BUN 33 mg/dL      Creatinine 1.11 mg/dL      Sodium 139 mmol/L      Potassium 3.8 mmol/L      Chloride 99 mmol/L      CO2 30.0 mmol/L      Calcium 8.3 mg/dL      Total Protein 5.9 g/dL      Albumin 2.9 g/dL      ALT (SGPT) 20 U/L      AST (SGOT) 15 U/L      Alkaline Phosphatase 106 U/L      Total Bilirubin 0.3 mg/dL      Globulin 3.0 gm/dL      A/G Ratio 1.0 g/dL      BUN/Creatinine Ratio 29.7     Anion Gap 10.0 mmol/L      eGFR 72.8 mL/min/1.73     Narrative:      GFR Normal >60  Chronic Kidney Disease <60  Kidney Failure <15      Blood Culture - Blood, Hand, Right [[id number]]  (Normal) Collected: 04/19/23 0209    Specimen: Blood from Hand, Right Updated: 04/24/23 0230     Blood Culture No growth at 5 days    Blood Culture - Blood, Hand, Left [[id number]]  (Normal) Collected: 04/19/23 0207    Specimen: Blood from Hand, Left Updated: 04/24/23 0230     Blood Culture No growth at 5 days    C-reactive Protein [[id number]]  (Abnormal) Collected: 04/23/23 2354    Specimen: Blood Updated: 04/24/23 0022     C-Reactive Protein 2.75 mg/dL     POC Glucose Once [[id number]]   (Abnormal) Collected: 04/23/23 2359    Specimen: Blood Updated: 04/24/23 0009     Glucose 256 mg/dL      Comment: : [id number] Francisco ByrneidaMeter ID: [id number]       Sedimentation Rate [[id number]]  (Abnormal) Collected: 04/23/23 2354    Specimen: Blood Updated: 04/24/23 0008     Sed Rate 55 mm/hr     POC Glucose Once [[id number]]  (Abnormal) Collected: 04/23/23 2026    Specimen: Blood Updated: 04/23/23 2037     Glucose 267 mg/dL      Comment: : [id number] Francisco ByrneidaMeter ID: [id number]           Imaging Results (Last 24 Hours)     Procedure Component Value Units Date/Time    XR Chest 1 View [[id number]] Collected: 04/24/23 0745     Updated: 04/24/23 0754    Narrative:      EXAMINATION: XR CHEST 1 VW- 4/24/2023 7:45 AM CDT     HISTORY: Intubated and sedated; S06.4X0A-Epidural hemorrhage without  loss of consciousness, initial encounter; Z74.09-Other reduced mobility;  Z98.890-Other specified postprocedural states; T81.42XA-Infection  following a procedure, deep incisional surgical site, initial encounter;  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     COMPARISON: Chest x-ray 4/23/2020 1044 hours.     Endotracheal tube, NG tube and right internal jugular central line  appear in satisfactory position as before. There is volume loss in the  lung bases, with mild central vascular congestion, and probable volume  overload, which appears improved. There are persistent patchy subhilar  airspace opacities greater on the right extending into the right lateral  base, without lobar consolidation. No pneumothorax or pleural effusion  is identified.       Impression:      1. Stable satisfactory position of multiple life-support lines.  2. Mild improvement in pulmonary edema and volume overload.  This report was finalized on 04/24/2023 07:51 by Dr. Elijah Grover MD.                Assessment & Plan      Oropharyngeal dysphagia   Obesity BMI 39     Mr. Escobar is a 67 year old male requiring prolonged intubation s/p craniectomy. He is now recommended for trach and PEG. HE is tolerating goal tube feeds and does not have a significant past surgical history. He is not on blood thinners. I have recommended PEG placement. Risks including bleeding, infection, damage to surrounding structures specifically the colon, and cardiopulmonary effects of anesthesia. He is at increased risk of perioperative complications 2/2 his elevated BMI. To OR tomorrow at 12:00 for combo PEG/Trach with ENT.     I have personally reviewed the notes from ENT, neurosurgery, and the hospitalist. I have reviewed the labs from today.       Geeta Abdalla MD  04/24/23  19:21 CDT

## 2023-04-25 NOTE — PROGRESS NOTES
Nutrition Services    Patient Name:  Elijah Escobar  YOB: 1955  MRN: [id number]  Admit Date:  3/31/2023    Ntn follow-up.    TF was changed 4/24 to Diabetisource AC to see if that would better control BS. Rate had to be increased to 65 to meet est'd needs. TF was turned off at 0400 for surgery today. RN notes slight improvement in BS with formula change. Plans for trach and PEG tube placement today. Will cont to follow.    Electronically signed by:  Pina Whitt RDN, RIGO  04/25/23 10:19 CDT

## 2023-04-25 NOTE — PLAN OF CARE
Goal Outcome Evaluation:      Pt does not follow commands but does withdraw from pain in both lower extremities. HR has been in Afib 's and BP has been stable. TF was stopped at 0400, and pt did tolerate TF with max residual of 100. Pt did not tolerate the vent a few times this shift, pt became restless and agitated. Morphine 5 mg was administered twice this shift along with one dose of Zofran 4mg. Vent settings are at Rate of 12, Peep at 5, and FiO2 at 40. UOP this shift has been adequate, and patient safety maintained.

## 2023-04-25 NOTE — PROGRESS NOTES
Infectious Diseases Progress Note    Patient:  Elijah Escobar  YOB: 1955  MRN: [id number]   Admit date: 3/31/2023   Admitting Physician: Flaco Portillo, *  Primary Care Physician: Brianna Martinez APRN    Chief Complaint/Interval History: He does not seem to be showing significant increase in responsiveness.  He is without fever.  He was working with therapy this morning when I examined him.  He is not demonstrating any fever.  FiO2 requirement stable at 30%.    Intake/Output Summary (Last 24 hours) at 4/25/2023 0744  Last data filed at 4/25/2023 0405  Gross per 24 hour   Intake 2225.5 ml   Output 1725 ml   Net 500.5 ml     Allergies: No Known Allergies  Current Scheduled Medications:   aspirin, 81 mg, Oral, Daily  atorvastatin, 20 mg, Oral, Nightly  budesonide, 0.5 mg, Nebulization, BID - RT  Chlorhexidine Gluconate Cloth, 1 application, Topical, Q24H  digoxin, 250 mcg, Oral, Daily  dilTIAZem, 90 mg, Oral, Q8H  furosemide, 40 mg, Intravenous, BID  gabapentin, 300 mg, Oral, Q8H  insulin detemir, 25 Units, Subcutaneous, Q12H  insulin lispro, 0-14 Units, Subcutaneous, Q6H  ipratropium, 0.5 mg, Nebulization, 4x Daily - RT  levETIRAcetam, 500 mg, Oral, BID  levoFLOXacin, 500 mg, Oral, Q24H  methylnaltrexone, 12 mg, Subcutaneous, Every Other Day  metoclopramide, 10 mg, Oral, 4x Daily AC & at Bedtime  metoprolol tartrate, 50 mg, Oral, Q12H  multivitamin with minerals, 1 tablet, Oral, Daily  pantoprazole, 40 mg, Intravenous, Q AM  polyethylene glycol, 17 g, Oral, Daily  sennosides-docusate, 1 tablet, Oral, Daily  sodium chloride, 10 mL, Intravenous, Q12H  sucralfate, 1 g, Oral, 4x Daily AC & at Bedtime      Current PRN Medications:  •  acetaminophen **OR** acetaminophen **OR** acetaminophen  •  butalbital-acetaminophen-caffeine  •  calcium carbonate  •  dextrose  •  dextrose  •  glucagon (human recombinant)  •  hydrOXYzine  •  ipratropium  •  ipratropium-albuterol  •  Morphine  •  nicotine  •   "ondansetron **OR** ondansetron  •  oxyCODONE-acetaminophen  •  oxyCODONE-acetaminophen  •  sodium chloride  •  sodium chloride    Review of Systems unobtainable from patient due to mechanical ventilation/mental status.    Vital Signs:  /89   Pulse 110   Temp 98.9 °F (37.2 °C) (Axillary)   Resp 22   Ht 177.8 cm (70\")   Wt 122 kg (268 lb 3.2 oz)   SpO2 100%   BMI 38.48 kg/m²     Physical Exam  Vital signs - reviewed.  Line/IV site - No erythema, warmth, induration, or tenderness.  No change in mental status by my exam.    Lab Results:  CBC:   Results from last 7 days   Lab Units 04/23/23  2354 04/19/23  0657   WBC 10*3/mm3 10.38 13.61*   HEMOGLOBIN g/dL 10.0* 10.2*   HEMATOCRIT % 33.1* 33.8*   PLATELETS 10*3/mm3 245 163     BMP:  Results from last 7 days   Lab Units 04/25/23  0236 04/24/23  0225 04/23/23  0321 04/22/23  0256 04/21/23  0223 04/19/23  0657   SODIUM mmol/L 136 139 140 139 138 138   POTASSIUM mmol/L 3.8 3.8 4.2 3.8 3.7 4.2   CHLORIDE mmol/L 96* 99 99 102 100 101   CO2 mmol/L 32.0* 30.0* 29.0 28.0 28.0 24.0   BUN mg/dL 30* 33* 35* 29* 21 16   CREATININE mg/dL 1.03 1.11 1.13 1.15 1.04 1.26   GLUCOSE mg/dL 199* 231* 263* 232* 265* 139*   CALCIUM mg/dL 8.2* 8.3* 8.2* 7.8* 7.9* 7.7*   ALT (SGPT) U/L 20 20 26 24 32 60*     Culture Results:   Blood Culture   Date Value Ref Range Status   04/19/2023 No growth at 5 days  Final   04/19/2023 No growth at 5 days  Final     Urine Culture   Date Value Ref Range Status   04/19/2023 No growth  Final     Radiology: None  Additional Studies Reviewed: None    Impression:   1.  Prior infection at cranioplasty site with Serratia-completed IV treatment.  We are continuing an additional 1 to 2 weeks of levofloxacin via NG tube.  2.  Respiratory failure following episode of PEA-remained stable on 30% FiO2.  No increasing respiratory secretions.  Suspect he could be extubated if mental status was not holding extubation back.  3.  Decreased responsiveness following " episode of PEA-no change from previous exam    Recommendations:   No new antibiotic recommendations  Continue levofloxacin  Continue supportive care    Edwin Jeffers MD
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
## 2023-04-25 NOTE — ANESTHESIA PREPROCEDURE EVALUATION
Anesthesia Evaluation     Patient summary reviewed and Nursing notes reviewed   no history of anesthetic complications:  NPO Solid Status: > 8 hours  NPO Liquid Status: > 8 hours           Airway   Dental      Pulmonary - negative pulmonary ROS   (-) COPD, sleep apnea    PE comment: ETT in place  Cardiovascular   Exercise tolerance: poor (<4 METS)    ECG reviewed    (+) hypertension, CAD, dysrhythmias Atrial Fib, PVD, hyperlipidemia,     ROS comment: Echo:  •  Left ventricular systolic function is mildly decreased. Left ventricular ejection fraction appears to be 46 - 50%.  •  Left ventricular wall thickness is consistent with mild to moderate concentric hypertrophy.  •  The following left ventricular wall segments are hypokinetic: apical anterior, apical lateral, apical inferior, apical septal, apex hypokinetic and mid anteroseptal.  •  Left atrial volume is mildly increased.  •  Estimated right ventricular systolic pressure from tricuspid regurgitation is mildly elevated (35-45 mmHg).  •  Normal size and function the right ventricle.  •  No significant (greater than mild) valvular pathology.  •  Mild dilation of the aortic root is present.  •  Compared to prior exam from 6/19/2022, both studies were technically difficult, but upon my independent review of the previous exam, there did appear to be some mild apical hypokinesis present on that study that does look slightly more prominent on current exam.           Neuro/Psych- negative ROS  (-) TIA, CVA    ROS Comment: S/P craniotomy for meningioma, s/p craniectomy with washout with subsequent cranioplasty, then craniectomy for infection.  Presents today for tach & PEG  GI/Hepatic/Renal/Endo    (+) morbid obesity,    (-) liver disease    Musculoskeletal (-) negative ROS    Abdominal    Substance History - negative use     OB/GYN negative ob/gyn ROS         Other                          Anesthesia Plan    ASA 3     general     intravenous induction     Anesthetic  plan, risks, benefits, and alternatives have been provided, discussed and informed consent has been obtained with: patient.        CODE STATUS:

## 2023-04-25 NOTE — PLAN OF CARE
Goal Outcome Evaluation:  Plan of Care Reviewed With: patient        Progress: no change  Outcome Evaluation: Pt remains on the vent. Opened his eyes a few times when his named was called out. Still no command following or tracking with his eyes. Performed PROM BLE and BUE. Will cont POC.

## 2023-04-25 NOTE — PLAN OF CARE
Goal Outcome Evaluation:     Problem: Communication Impairment (Mechanical Ventilation, Invasive)  Goal: Effective Communication  Outcome: Ongoing, Progressing     Problem: Device-Related Complication Risk (Mechanical Ventilation, Invasive)  Goal: Optimal Device Function  Outcome: Ongoing, Progressing  Intervention: Optimize Device Care and Function  Recent Flowsheet Documentation  Taken 4/24/2023 2030 by Alysia Coates RRT  Airway Safety Measures:   mask valve resuscitator at bedside   manual resuscitator/mask at bedside   oxygen flowmeter at bedside   suction at bedside     Problem: Inability to Wean (Mechanical Ventilation, Invasive)  Goal: Mechanical Ventilation Liberation  Outcome: Ongoing, Progressing     Problem: Nutrition Impairment (Mechanical Ventilation, Invasive)  Goal: Optimal Nutrition Delivery  Outcome: Ongoing, Progressing     Problem: Skin and Tissue Injury (Mechanical Ventilation, Invasive)  Goal: Absence of Device-Related Skin and Tissue Injury  Outcome: Ongoing, Progressing  Intervention: Maintain Skin and Tissue Health  Recent Flowsheet Documentation  Taken 4/24/2023 2030 by Alysia Coates RRT  Device Skin Pressure Protection:   absorbent pad utilized/changed   skin-to-device areas padded     Problem: Ventilator-Induced Lung Injury (Mechanical Ventilation, Invasive)  Goal: Absence of Ventilator-Induced Lung Injury  Outcome: Ongoing, Progressing  Intervention: Prevent Ventilator-Associated Pneumonia  Recent Flowsheet Documentation  Taken 4/24/2023 2030 by Alysia Coates RRT  Head of Bed (HOB) Positioning: HOB at 30-45 degrees  VAP Prevention Bundle: HOB elevation maintained     Problem: Noninvasive Ventilation Acute  Goal: Effective Unassisted Ventilation and Oxygenation  Intervention: Monitor and Manage Noninvasive Ventilation  Recent Flowsheet Documentation  Taken 4/24/2023 2030 by Alysia Coates RRT  Airway/Ventilation Management: airway patency maintained     Problem: Skin Injury  Risk Increased  Goal: Skin Health and Integrity  Intervention: Optimize Skin Protection  Recent Flowsheet Documentation  Taken 4/24/2023 2030 by Alysia Coates, RRT  Head of Bed (HOB) Positioning: HOB at 30-45 degrees     Problem: Aspiration (Enteral Nutrition)  Goal: Absence of Aspiration Signs and Symptoms  Intervention: Minimize Aspiration Risk  Recent Flowsheet Documentation  Taken 4/24/2023 2030 by Alysia Coates, RRT  Head of Bed (HOB) Positioning: HOB at 30-45 degrees

## 2023-04-25 NOTE — OP NOTE
[facility]  OPERATIVE NOTE    Elijah Escobar  4/25/2023    Pre-op Diagnosis:   Respiratory Insufficiency  Chronic obstructive pulmonary disease, unspecified COPD type [J44.9]    Post-op Diagnosis:     Same  Chronic obstructive pulmonary disease, unspecified COPD type [J44.9]    Procedure/CPT® Codes::  TRACHEOSTOMY      Surgeon(s):  Wagner Villarreal MD Williams, Kristen N, MD    Anesthesia:   General Endotracheal Anesthesia    Estimated Blood Loss:   Minimal    Specimens:                None      Drains:   None    Findings:   as below    Complications:  None    PROCEDURE:  The patient was placed in the supine position and Dr. Geeta Abdalla performed a PEG tube placement which is dictated under separate operative report.  Subsequently under General Endotracheal Anesthesia the patient was prepped and draped in the usual fashion.      Approximately 8 mL of 1% Xylocaine with epinephrine (1:1000,000) was injected into the planned incision site 2 fingerbreadths above the manubrium area.  An incision was made with a #15 blade.  The incision was oriented horizontally and its length was approximately 5 cm. The incision was carried down through the superficial layer of deep cervical fascia. Minimal bleeding was encountered which was controlled throughout the procedure utilizing a combination of 2-0 and 3-0 silk ties and electrocautery.     The strap musculature was identified in the midline and retracted laterally.  The isthmus of the thyroid gland was encountered and divided in the midline utilizing electrocautery.  The anterior tracheal wall was identified. A trach hook was placed beneath the cricoid cartilage and the trachea elevated in the wound.  The anterior tracheal wall was cleared with blunt dissection consisting of digital dissection with a 4 x 4. An inferiorly based trapezoidall-shaped U flap was subsequently fashioned, (Rom flap) utilizing #11 blade and curved Garcia scissors. The superior  margin of the tracheal flap was sutured to the inferior skin margin utilizing 0 Ethibond.      The endotracheal tube was gently withdrawn and a # 8 cuffed Shiley  tracheostomy tube was placed without difficulty. The patient was subsequently ventilated through the tracheostomy tube, placement of which was confirmed by appropriate chest rise and the presence of a CO2 wave on the monitor. The tracheostomy tube was secured with trach tape and further secured with 2-0 silk sutured to the skin of the anterior neck. Bactroban ointment and sterile dressing were applied and the procedure was terminated.    The patient tolerated the procedure well and was transported back to the CCU in stable condition.           Wagner Villarreal MD     Date: 4/25/2023  Time: 13:26 CDT

## 2023-04-25 NOTE — PROGRESS NOTES
Palliative care consult received, electronic communication to attending physician with neurosurgery service wishes to hold on palliative consult for now. Discussed electronically with Dr. Singh. Please re-consult if needs arise.     ADITYA Bermudez  Palliative care services

## 2023-04-25 NOTE — PROGRESS NOTES
Neurosurgery Daily Progress Note    HPI:  Elijah Escobar is a 67 y.o. male with a significant medical history of hypertension, diabetes, hyperlipidemia, seizures, craniotomy for tumor (6/16/2022), craniotomy for washout (7/1/2022), craniectomy (10/4/2022), coronary artery disease, diabetes, erectile dysfunction, GERD, hypertension, hyperlipidemia, tobacco abuse, and obesity.  He presents today with a complaint of a 4-5 days onset of progressively worsening right frontal, temporal, and periorbital edema and associated symptoms to include, but not limited to generalized fatigue, chills, dyspnea, intermittent nausea without vomiting, and states he's felt feverish.  Physical exam findings of neurologically intact with right frontal, temporal, and periorbital swelling.  WBC elevated at 15.  3 to an CRP elevated at 14.75.  Imaging pending.    Assessment:   Past Medical History:   Diagnosis Date   • Brain tumor    • Coronary artery disease    • COVID-19 vaccine series completed     MODERNA X 3; LAST DOSE 3/2022   • Diabetes    • Erectile dysfunction    • GERD (gastroesophageal reflux disease)    • Hypercholesteremia    • Hypertension    • Seizure      Active Hospital Problems    Diagnosis    • **Swelling of scalp    • Elevated liver function tests    • COPD (chronic obstructive pulmonary disease)    • Obesity (BMI 30-39.9)    • Tobacco abuse, in remission    • Acute systolic heart failure    • ADDIE (obstructive sleep apnea)    • Type 2 myocardial infarction due to arrhythmia    • Acute pulmonary edema due to A-fib RVR    • Acute respiratory failure with hypoxia    • Intracranial epidural hematoma    • Post-operative infection    • Paroxysmal atrial fibrillation with rapid ventricular response    • Type 2 diabetes mellitus with hyperglycemia and peripheral neuropathy, with long-term current use of insulin (HCC)    • Coronary artery disease    • Meningioma s/p craniotomy      Plan:   Neuro:   Slight improvement.  Now  grimacing and regarding to pain.    Infected cranial flap      POD #20 (4/4/2023)  craniectomy and washout    Postop code CT and MRI stable. No acute abnormalities.    Flap tapped I&D cultures 4+ gram-negative bacilli    Heavy 4+ Serratia marcescens     POD #13 (4/13/2023) evacuation of scalp hematoma    JUAN removed     Continue neuro exams per policy.  Call for decline  EEG shows no epileptiform activity or ongoing seizures  Anticipate trach and PEG early next week.    Tentative plan for trach and PEG placement today per Dr. Wagner Villarreal and Dr. Geeta Abdalla.  Appreciate assistance.    CV: Code for PEA, 4/16/2023.  Required epinephrine and bicarb   AFib/flutter, rate controlled.   Hold all anticoagulation for 2 weeks.   No evidence of DVT or PE per imaging   Appreciate cardiology  Pulm: Remains intubated off propofol x 48+ hours.     Mild improvement in central pulmonary edema and vascular congestion per CXR      :  Voiding per Jeong catheter.  Trace leuks.  TNTC WBC  FEN: Currently NPO  Endocrine: Glucose improving.  Continue SSI  GI: SHERIF.  +BM  ID:  Infected cranial flap  CSF: no growth to date   PNA.  Resolved   UTI.  Resolved   Continue antibiotics.  Remains on Levaquin   Heme:  DVT prophylaxis with SCDs.  Hold all anticoagulation for 2 weeks.  Pain: No complaints at present  Dispo: Pending trach and PEG placement.  ENT and general surgery     Chief complaint:   4-5 days onset of progressively worsening right frontal, temporal, and periorbital edema and associated symptoms to include, but not limited to generalized fatigue, chills, dyspnea, intermittent nausea without vomiting, and states he's felt feverish.    HPI  Subjective:  Symptoms:  Stable.      Temp:  [98.3 °F (36.8 °C)-99 °F (37.2 °C)] 98.3 °F (36.8 °C)  Heart Rate:  [] 87  Resp:  [16-26] 16  BP: (108-151)/(61-98) 108/68  FiO2 (%):  [30 %-40 %] 40 %    Output by Drain (mL) 04/24/23 0701 - 04/24/23 1900 04/24/23 1901 - 04/25/23 0700 04/25/23  "0701 - 04/25/23 0838 Range Total   Requested LDAs do not have output data documented.     Objective:  Vital signs: (most recent): Blood pressure 108/68, pulse 87, temperature 98.3 °F (36.8 °C), temperature source Axillary, resp. rate 16, height 177.8 cm (70\"), weight 122 kg (268 lb 3.2 oz), SpO2 95 %.      Neurologic Exam     Mental Status     Off sedation.  Gag and cough intact, pupillary response intact, intermittently opens eyes to voice and grimaces to pain.  Does not follow commands.       Cranial Nerves     CN III, IV, VI   Pupils are equal, round, and reactive to light.    CN VII   Right facial weakness: none  Left facial weakness: none    CN IX, X   Right gag reflex: normal  Left gag reflex: normal    Motor Exam   Intermittently regards to painful stimuli to the upper extremities.  Withdraws to painful stimuli to lower extremities     Drains: * No LDAs found *    Imaging Results (Last 24 Hours)     Procedure Component Value Units Date/Time    XR Chest 1 View [[id number]] Collected: 04/25/23 0721     Updated: 04/25/23 0724    Narrative:      HISTORY: Intubated     CXR: A frontal view the chest obtained     COMPARISON: 04/24/2023     FINDINGS: Endotracheal tube remains appropriate in position. Enteric  tube descends into the abdomen. Right IJ central line tip projects near  the atrial caval junction. Similar diffuse bilateral pulmonary opacities  with likely small layering pleural effusions. No pneumothorax. Stable  mediastinal contours.       Impression:      1. Stable 1 day view the chest.  This report was finalized on 04/25/2023 07:21 by Dr. Coleen Terry MD.        Lab Results (last 24 hours)     Procedure Component Value Units Date/Time    POC Glucose Once [[id number]]  (Abnormal) Collected: 04/25/23 0534    Specimen: Blood Updated: 04/25/23 0546     Glucose 216 mg/dL      Comment: : [id number] Pal ID: [id number]       Blood Gas, Arterial - [[id number]]  (Abnormal) Collected: 04/25/23 " 0357    Specimen: Arterial Blood Updated: 04/25/23 0357     Site Left Brachial     Marek's Test N/A     pH, Arterial 7.467 pH units      Comment: 83 Value above reference range        pCO2, Arterial 45.7 mm Hg      Comment: 83 Value above reference range        pO2, Arterial 74.1 mm Hg      Comment: 84 Value below reference range        HCO3, Arterial 33.0 mmol/L      Comment: 83 Value above reference range        Base Excess, Arterial 8.3 mmol/L      Comment: 83 Value above reference range        O2 Saturation, Arterial 95.8 %      Temperature 37.0 C      Barometric Pressure for Blood Gas 753 mmHg      Modality Ventilator     FIO2 30 %      Ventilator Mode AC     Set Tidal Volume 500     Set Mech Resp Rate 12.0     PEEP 5.0     Collected by RAJAT THOMPSONER     Comment: Meter: [id number]     :  [id number]        pCO2, Temperature Corrected 45.7 mm Hg      pH, Temp Corrected 7.467 pH Units      pO2, Temperature Corrected 74.1 mm Hg     Comprehensive Metabolic Panel [[id number]]  (Abnormal) Collected: 04/25/23 0236    Specimen: Blood Updated: 04/25/23 0312     Glucose 199 mg/dL      BUN 30 mg/dL      Creatinine 1.03 mg/dL      Sodium 136 mmol/L      Potassium 3.8 mmol/L      Chloride 96 mmol/L      CO2 32.0 mmol/L      Calcium 8.2 mg/dL      Total Protein 5.9 g/dL      Albumin 2.9 g/dL      ALT (SGPT) 20 U/L      AST (SGOT) 18 U/L      Alkaline Phosphatase 115 U/L      Total Bilirubin 0.3 mg/dL      Globulin 3.0 gm/dL      A/G Ratio 1.0 g/dL      BUN/Creatinine Ratio 29.1     Anion Gap 8.0 mmol/L      eGFR 79.6 mL/min/1.73     Narrative:      GFR Normal >60  Chronic Kidney Disease <60  Kidney Failure <15      POC Glucose Once [[id number]]  (Abnormal) Collected: 04/24/23 2336    Specimen: Blood Updated: 04/24/23 2348     Glucose 194 mg/dL      Comment: : [id number] Vu Pal ID: [id number]       POC Glucose Once [[id number]]  (Abnormal) Collected: 04/24/23 2056    Specimen: Blood  Updated: 04/24/23 2107     Glucose 202 mg/dL      Comment: : [id number] Francisco CatyidaMeter ID: [id number]       POC Glucose Once [[id number]]  (Abnormal) Collected: 04/24/23 1731    Specimen: Blood Updated: 04/24/23 1742     Glucose 154 mg/dL      Comment: : [id number] Nick BaezaMeter ID: [id number]       POC Glucose Once [[id number]]  (Abnormal) Collected: 04/24/23 1208    Specimen: Blood Updated: 04/24/23 1220     Glucose 254 mg/dL      Comment: : [id number] Real WangMeter ID: [id number]           Rahul Arnold, APRN

## 2023-04-25 NOTE — OP NOTE
Percutaneous Gastrostomy Insertion Operative Report:     Preoperative diagnosis:   Oropharyngeal dysphagia.  Postoperative diagnosis:   Oropharyngeal dysphagia.  Procedure: Esophagogastroduodenoscopy with percutaneous endoscopic gastrostomy placement    Attending surgeon: Geeta Abdalla MD   Anesthesia: general   Specimens: none  Blood loss: 5 mL   Drains: 20 Afghan pull PEG.  Indications: Mr. Escobar is a 67 year old male requiring prolonged intubation s/p craniectomy. He is now recommended for trach and PEG. HE is tolerating goal tube feeds and does not have a significant past surgical history. He is not on blood thinners. I have recommended PEG placement. Risks including bleeding, infection, damage to surrounding structures specifically the colon, and cardiopulmonary effects of anesthesia. He is at increased risk of perioperative complications 2/2 his elevated BMI. To OR today.     Description of procedure: Consent was obtained. The patient was transferred to the OR and general anesthesia was induced. A surgical timeout was performed. The flexible endoscope was inserted into the mouth through the bite block.    The esophagus was examined upon advancement of the scope. It was normal. There was no hiatal hernia. The stomach was normal.  The stomach was fully insufflated. A suitable location for placement of the PEG tube was identified by transillumination with the endoscopic light source and careful ballottement of the abdominal wall.  The skin was prepped with ChloraPrep and draped in sterile fashion. The skin was infiltrated with 1% lidocaine. The lidocaine needle was inserted into the stomach, aspirating the whole time and no air was aspirated until the stomach was entered. An incision was made. An 18 gauge needle with the introducer was passed into the stomach. A guidewire was passed.  It was grasped with the endoscopic snare.  The guidewire, snare and endoscope were withdrawn through the patient's mouth.  A  20 Arabic pull-style PEG tube was advanced over the guidewire.  It was seated at 4 cm from the bumper according to the markings on the tube. The PEG was examined with the endoscope inside the stomach; hemostasis was confirmed, it was in good position. The retention flange and feeding tube adapter were attached. The PEG was adhered to the skin with tape and mesentery. An abdominal binder was placed. No apparent complications.     I then turned the patient over to the care of Dr. Villarreal for tracheostomy placement.     Geeta Abdalla MD  04/25/23

## 2023-04-25 NOTE — SIGNIFICANT NOTE
04/25/23 0717   Readings   PEEP Intrinsic (cm H2O) 5 cm H2O   Plateau Pressure (cm H2O) 9.3 cm H2O   Driving Pressure (cm H2O) 4.5 cm H2O   Static Compliance (L/cm H2O) 13   Dynamic Compliance (L/cm H2O) 90 L/cm H2O

## 2023-04-25 NOTE — ANESTHESIA POSTPROCEDURE EVALUATION
"Patient: Elijah Escobar    Procedure Summary     Date: 04/25/23 Room / Location:  [facility] OR  /  [facility]    Anesthesia Start: 1153 Anesthesia Stop: 1325    Procedures:       TRACHEOSTOMY (Neck)      ESOPHAGOGASTRODUODENOSCOPY WITH GASTROSTOMY TUBE INSERTION Diagnosis:       Chronic obstructive pulmonary disease, unspecified COPD type      (Chronic obstructive pulmonary disease, unspecified COPD type [J44.9])    Surgeons: Wagner Villarreal MD; Geeta Abdalla MD Provider: Maya Sommers CRNA    Anesthesia Type: general ASA Status: 3          Anesthesia Type: general    Vitals  Vitals Value Taken Time   /79 04/25/23 1401   Temp     Pulse 132 04/25/23 1406   Resp     SpO2 88 % 04/25/23 1405   Vitals shown include unvalidated device data.        Post Anesthesia Care and Evaluation    Patient location during evaluation: ICU  Pain management: adequate    Airway patency: patent  Anesthetic complications: No anesthetic complications    Cardiovascular status: acceptable  Respiratory status: ventilator and trach  Hydration status: acceptable    Comments: Blood pressure 146/84, pulse 95, temperature 98.3 °F (36.8 °C), temperature source Axillary, resp. rate 14, height 177.8 cm (70\"), weight 122 kg (268 lb 3.2 oz), SpO2 96 %.          "

## 2023-04-26 NOTE — PROGRESS NOTES
RT EQUIPMENT DEVICE RELATED - SKIN ASSESSMENT    RT Medical Equipment/Device:     Tracheostomy    Skin Assessment:      Neck:  Incision    Device Skin Pressure Protection:  Skin-to-device areas padded:  Optifoam    Nurse Notification:  Kylie Motta CRT

## 2023-04-26 NOTE — PROGRESS NOTES
RT EQUIPMENT DEVICE RELATED - SKIN ASSESSMENT    RT Medical Equipment/Device:     Tracheostomy    Skin Assessment:      Neck:  Intact    Device Skin Pressure Protection:  Skin-to-device areas padded:  Optifoam    Nurse Notification:  Kylie Hayden, RRT

## 2023-04-26 NOTE — SIGNIFICANT NOTE
04/26/23 1006   Spontaneous Breathing Trial   $ SBT Readiness to Wean yes   Vt Spontaneous (mL) 0 mL   Effort (VC) unable   RSBI 0   Effort (NIF) unable   Resp Rate (Obs) 0     Pt placed on spontaneous ps 10..the patient was apneic after 10 seconds. Failed trial

## 2023-04-26 NOTE — PLAN OF CARE
Goal Outcome Evaluation:    Problem: Communication Impairment (Mechanical Ventilation, Invasive)  Goal: Effective Communication  Outcome: Ongoing, Progressing     Problem: Device-Related Complication Risk (Mechanical Ventilation, Invasive)  Goal: Optimal Device Function  Outcome: Ongoing, Progressing     Problem: Inability to Wean (Mechanical Ventilation, Invasive)  Goal: Mechanical Ventilation Liberation  Outcome: Ongoing, Not Progressing

## 2023-04-26 NOTE — SIGNIFICANT NOTE
04/26/23 0745   Readings   Plateau Pressure (cm H2O) 17 cm H2O   Driving Pressure (cm H2O) 10 cm H2O   Static Compliance (L/cm H2O) 45   Dynamic Compliance (L/cm H2O) 74 L/cm H2O

## 2023-04-26 NOTE — PROGRESS NOTES
Geeta Abdalla MD - General Surgery  Progress Note     LOS: 26 days   Patient Care Team:  Brianna Martinez APRN as PCP - General (Nurse Practitioner)  Rahul Arnold APRN as Nurse Practitioner (Nurse Practitioner)  Flaco Portillo MD as Surgeon (Neurosurgery)      Subjective     Interval History:     S/p PEG placement yesterday. Tolerating meds via PEG. Tube feeds to start this AM. No issues per nursing.      Objective     Vital Signs  Temp:  [98.5 °F (36.9 °C)-98.8 °F (37.1 °C)] 98.8 °F (37.1 °C)  Heart Rate:  [] 78  Resp:  [12-23] 20  BP: (101-151)/(65-86) 109/78  FiO2 (%):  [30 %-40 %] 40 %    Physical Exam:  General appearance - chronically ill   Mental status - unable to assess   Eyes - sclera anicteric  Neck - trach in place   Chest - on vent via trach   Heart - regular rate   Abdomen - soft, nontender, nondistended, no masses or organomegaly. PEG in place       Results Review:    Lab Results (last 24 hours)     Procedure Component Value Units Date/Time    POC Glucose Once [[id number]]  (Normal) Collected: 04/26/23 0646    Specimen: Blood Updated: 04/26/23 0656     Glucose 112 mg/dL      Comment: : [id number] Raúl GinaMeter ID: [id number]       Comprehensive Metabolic Panel [[id number]]  (Abnormal) Collected: 04/26/23 0300    Specimen: Blood Updated: 04/26/23 0418     Glucose 97 mg/dL      BUN 28 mg/dL      Creatinine 1.07 mg/dL      Sodium 140 mmol/L      Potassium 4.0 mmol/L      Chloride 99 mmol/L      CO2 33.0 mmol/L      Calcium 8.5 mg/dL      Total Protein 6.0 g/dL      Albumin 3.0 g/dL      ALT (SGPT) 17 U/L      AST (SGOT) 17 U/L      Alkaline Phosphatase 100 U/L      Total Bilirubin 0.6 mg/dL      Globulin 3.0 gm/dL      A/G Ratio 1.0 g/dL      BUN/Creatinine Ratio 26.2     Anion Gap 8.0 mmol/L      eGFR 76.1 mL/min/1.73     Narrative:      GFR Normal >60  Chronic Kidney Disease <60  Kidney Failure <15      Blood Gas, Arterial - [[id number]]  (Abnormal) Collected: 04/26/23  0343    Specimen: Arterial Blood Updated: 04/26/23 0342     Site Right Radial     Marek's Test Positive     pH, Arterial 7.481 pH units      Comment: 83 Value above reference range        pCO2, Arterial 46.0 mm Hg      Comment: 83 Value above reference range        pO2, Arterial 68.2 mm Hg      Comment: 84 Value below reference range        HCO3, Arterial 34.3 mmol/L      Comment: 83 Value above reference range        Base Excess, Arterial 9.7 mmol/L      Comment: 83 Value above reference range        O2 Saturation, Arterial 94.6 %      Temperature 37.0 C      Barometric Pressure for Blood Gas 752 mmHg      Modality Ventilator     FIO2 30 %      Ventilator Mode AC     Set Tidal Volume 500     Set Mech Resp Rate 12.0     PEEP 7.0     Collected by [id number]     Comment: Meter: [id number]     :  [id number]        pCO2, Temperature Corrected 46.0 mm Hg      pH, Temp Corrected 7.481 pH Units      pO2, Temperature Corrected 68.2 mm Hg     POC Glucose Once [[id number]]  (Normal) Collected: 04/26/23 0011    Specimen: Blood Updated: 04/26/23 0022     Glucose 71 mg/dL      Comment: : [id number] [facility]: [id number]       POC Glucose Once [[id number]]  (Normal) Collected: 04/25/23 2115    Specimen: Blood Updated: 04/25/23 2126     Glucose 81 mg/dL      Comment: : [email or web address] Evans ID: [id number]       POC Glucose Once [[id number]]  (Normal) Collected: 04/25/23 1705    Specimen: Blood Updated: 04/25/23 1716     Glucose 109 mg/dL      Comment: : [email or web address] Real Evans ID: [id number]       POC Glucose Once [[id number]]  (Abnormal) Collected: 04/25/23 1139    Specimen: Blood Updated: 04/25/23 1150     Glucose 179 mg/dL      Comment: : [email or web address] Real Evans ID: [id number]           Imaging Results (Last 24 Hours)     Procedure Component Value Units Date/Time    XR Chest 1 View [[id number]] Collected: 04/26/23 0738     Updated: 04/26/23 0742    Narrative:       HISTORY: Intubated     CXR: A frontal view the chest obtained     COMPARISON: 04/25/2023     FINDINGS: New tracheostomy tube appears appropriate in position. A right  IJ central line tip projects near the atrial caval junction. Enteric  tube is been removed.     Diminished level of inspiration. Stable prominence of the cardiac  silhouette with questionable improving pulmonary edema. Left lower lobe  consolidation. Trace pleural effusions. No pneumothorax.       Impression:      1. New tracheostomy tube appropriate in position. Similar positioning of  the right IJ central line.  2. Mild cardiomegaly with mild improving pulmonary edema considered with  persistent left basilar consolidation. Probable trace pleural effusions.  This report was finalized on 04/26/2023 07:39 by Dr. Coleen Terry MD.    XR Abdomen 2+ VW with Chest 1 VW [[id number]] Collected: 04/25/23 1417     Updated: 04/25/23 1422    Narrative:      EXAMINATION: Chest one view and abdomen 2 view 04/25/2023     HISTORY: New tracheostomy     FINDINGS: A tracheostomy has been placed and projects over the midline  of the trachea. The lungs are hypoventilated. There is consolidation  within both lungs with an element of volume overload suspected with  widening of the vascular pedicle. There is no pneumothorax or  pneumomediastinum. Small effusions are present. A right IJ deep line  remains in place.     Supine and upright films of the abdomen demonstrate a nonspecific bowel  gas pattern with mild gaseous distention of several small bowel loops  within the midabdomen. A gastrostomy tube is in place.       Impression:      1.. Tracheostomy tube has been placed. No evidence of complication.  2. Poor inspiratory effort. There is evidence of volume overload with  widening of the vascular pedicle and vascular congestion. There is  increased consolidation within both lungs suggesting either interval  development of pulmonary edema or infiltrate. Small effusions  are  present.  3. Mild gaseous distention of several small bowel loops within the  midabdomen. A gastrostomy tube is in place. No free air is demonstrated.  This report was finalized on 04/25/2023 14:19 by Dr. Zack Mendoza MD.            Assessment & Plan     Oropharyngeal dysphagia   Obesity BMI 39     POD 1 s/p PEG placement. Tolerating meds via PEG. Restart tube feeds. Keep abdominal binder in place.       Geeta Abdalla MD  04/26/23  10:38 CDT

## 2023-04-26 NOTE — PROGRESS NOTES
[facility]  ENT PROGRESS NOTES  2023      Patient Identification:  Name: Elijah Escobar  Age: 67 y.o.  Sex: male  :  1955  MRN: [id number]                     Date of Admission: 3/31/2023      CC:    Postoperative day #1 status post tracheostomy  Subjective   Patient remains unresponsive and mechanically ventilated.  HISTORY   HPI, ROS, PMFSHx reviewed:   No changes       Objective     PE:    Temp:  [98.3 °F (36.8 °C)-98.8 °F (37.1 °C)] 98.3 °F (36.8 °C)  Heart Rate:  [] 96  Resp:  [16-23] 18  BP: (101-143)/(61-89) 137/74  FiO2 (%):  [30 %-40 %] 40 %   Body mass index is 38.75 kg/m².     General appearance: chronically ill appearing.   Ability to Communicate: Patient remains on mechanical ventilation with the tracheostomy and is unresponsive.   Facial exam: no craniofacial deformities   Ears - right ear normal, left ear normal.   Nasal exam - normal and patent, no erythema, discharge or polyps.   Oropharyngeal exam - mucous membranes moist, pharynx normal without lesions.   Neck exam -#8 cuffed Shiley tracheostomy tube in place.  No bleeding noted trach site looks good with sutures intact.     CVS exam: normal rate and regular rhythm.   Chest: Normal chest excursion appreciated with ventilation.   No lymphadenopathy in the anterior or posterior neck, supraclavicular areas.   Neurological exam reveals neck supple without rigidity.    DATA      MEDICATIONS   I have reviewed the current MAR.     LABS AND IMAGING      I have reviewed the labs and imaging data since the patient was last seen.       Assessment     ASSESSMENT       Swelling of scalp    Meningioma s/p craniotomy    Coronary artery disease    Type 2 diabetes mellitus with hyperglycemia and peripheral neuropathy, with long-term current use of insulin (HCC)    Paroxysmal atrial fibrillation with rapid ventricular response    Post-operative infection    Acute pulmonary edema due to A-fib RVR    Acute respiratory failure with  hypoxia    Type 2 myocardial infarction due to arrhythmia    COPD (chronic obstructive pulmonary disease)    Obesity (BMI 30-39.9)    Tobacco abuse, in remission    Acute systolic heart failure    ADDIE (obstructive sleep apnea)    Intracranial epidural hematoma    Elevated liver function tests   Postop day #1 status post tracheostomy      Plan     PLAN     Continue local wound care/tracheostomy care  We will continue to follow  Sutures out in 4 to 5 days          Wagner Villarreal MD  4/26/2023  15:56 CDT

## 2023-04-26 NOTE — PROGRESS NOTES
Infectious Diseases Progress Note    Patient:  Elijah Escobar  YOB: 1955  MRN: [id number]   Admit date: 3/31/2023   Admitting Physician: Flaco Portillo, *  Primary Care Physician: Brianna Martinez APRN    Chief Complaint/Interval History: Seems to open eyes to voice.  Could not tell that he would turn his head or direct his eyes towards voice.  He will shake his head side to side some.  Could not get any definitive purposeful movement.  He remains without fever.  Remains on 30% FiO2.    Intake/Output Summary (Last 24 hours) at 4/26/2023 1736  Last data filed at 4/26/2023 1700  Gross per 24 hour   Intake 1120 ml   Output 1350 ml   Net -230 ml     Allergies: No Known Allergies  Current Scheduled Medications:   aspirin, 81 mg, Oral, Daily  atorvastatin, 20 mg, Oral, Nightly  budesonide, 0.5 mg, Nebulization, BID - RT  Chlorhexidine Gluconate Cloth, 1 application, Topical, Q24H  digoxin, 250 mcg, Oral, Daily  dilTIAZem, 90 mg, Oral, Q8H  furosemide, 40 mg, Intravenous, BID  gabapentin, 300 mg, Oral, Q8H  insulin detemir, 25 Units, Subcutaneous, Q12H  insulin lispro, 0-14 Units, Subcutaneous, Q6H  ipratropium, 0.5 mg, Nebulization, 4x Daily - RT  levETIRAcetam, 500 mg, Oral, BID  levoFLOXacin, 500 mg, Oral, Q24H  methylnaltrexone, 12 mg, Subcutaneous, Every Other Day  metoclopramide, 10 mg, Oral, 4x Daily AC & at Bedtime  metoprolol tartrate, 50 mg, Oral, Q12H  multivitamin with minerals, 1 tablet, Oral, Daily  pantoprazole, 40 mg, Intravenous, Q AM  polyethylene glycol, 17 g, Oral, Daily  sennosides-docusate, 1 tablet, Oral, Daily  sodium chloride, 10 mL, Intravenous, Q12H  sucralfate, 1 g, Oral, 4x Daily AC & at Bedtime      Current PRN Medications:  •  acetaminophen **OR** acetaminophen **OR** acetaminophen  •  butalbital-acetaminophen-caffeine  •  calcium carbonate  •  dextrose  •  dextrose  •  glucagon (human recombinant)  •  hydrOXYzine  •  ipratropium  •  ipratropium-albuterol  •   "Morphine  •  nicotine  •  ondansetron **OR** ondansetron  •  oxyCODONE-acetaminophen  •  oxyCODONE-acetaminophen  •  sodium chloride  •  sodium chloride    Review of Systems unobtainable from patient due to mental status    Vital Signs:  /63   Pulse 90   Temp 98.3 °F (36.8 °C) (Axillary)   Resp 19   Ht 177.8 cm (70\")   Wt 123 kg (270 lb 1 oz)   SpO2 96%   BMI 38.75 kg/m²     Physical Exam  Vital signs - reviewed.  Line/IV site - No erythema, warmth, induration, or tenderness.  Craniotomy site without signs of infection.  Lungs without crackles or wheezes    Lab Results:  CBC:   Results from last 7 days   Lab Units 04/23/23  2354   WBC 10*3/mm3 10.38   HEMOGLOBIN g/dL 10.0*   HEMATOCRIT % 33.1*   PLATELETS 10*3/mm3 245     BMP:  Results from last 7 days   Lab Units 04/26/23  0300 04/25/23  0236 04/24/23  0225 04/23/23  0321 04/22/23  0256 04/21/23  0223   SODIUM mmol/L 140 136 139 140 139 138   POTASSIUM mmol/L 4.0 3.8 3.8 4.2 3.8 3.7   CHLORIDE mmol/L 99 96* 99 99 102 100   CO2 mmol/L 33.0* 32.0* 30.0* 29.0 28.0 28.0   BUN mg/dL 28* 30* 33* 35* 29* 21   CREATININE mg/dL 1.07 1.03 1.11 1.13 1.15 1.04   GLUCOSE mg/dL 97 199* 231* 263* 232* 265*   CALCIUM mg/dL 8.5* 8.2* 8.3* 8.2* 7.8* 7.9*   ALT (SGPT) U/L 17 20 20 26 24 32     Impression:   1.  Prior infection at cranioplasty site-Serratia had been recovered on culture.  He completed extended course of IV meropenem.  Now on levofloxacin via G-tube.  Would recommend an additional 2 weeks of levofloxacin treatment.  2.  Episode of PEA-decreased responsiveness following event.  3.  Respiratory failure following episode of PEA-stable from respiratory standpoint.  On 30% FiO2.  Remains intubated more on the basis of his mental status.    Recommendations:   Would recommend continuing levofloxacin via G-tube  Recommend an additional 2 weeks of levofloxacin treatment  I will be out through next Wednesday  Dr. Mccollum available in the interim if fever, new " problems, additional concerns for infection-please call her if condition changes.  Otherwise I will see him again midweek next week if he remains hospitalized.    Edwin Jeffers MD

## 2023-04-26 NOTE — PROGRESS NOTES
[facility]  INPATIENT PROGRESS NOTE    Patient Name: Elijah Escobar  Date of Admission: 3/31/2023  Today's Date: 04/26/23  Length of Stay: 26  Primary Care Physician: Brianna Martinez APRN    Subjective   Chief Complaint: Consultation for medical management  HPI     The patient is doing well post PEG tube and trach placement.  He remains responsive to painful stimulus.  He rocks his head to the midline and to the left but does not respond to verbal or tactile stimulus.  He does not follow simple commands.  Comprehensive metabolic panel is unremarkable.  ABGs show pH 7.48, PCO2 46 and PO2 68.2 with ventilator settings of 7 of PEEP and FiO2 0.3.  Referral has been made to LTAC and if the patient fails another weaning trial, they are likely to accept the patient in transfer.  No family is present at the time of my evaluation today.    Review of Systems   All pertinent negatives and positives are as above. All other systems have been reviewed and are negative unless otherwise stated.     Objective    Temp:  [98.5 °F (36.9 °C)-98.8 °F (37.1 °C)] 98.8 °F (37.1 °C)  Heart Rate:  [] 78  Resp:  [12-23] 20  BP: (101-151)/(65-86) 109/78  FiO2 (%):  [30 %-40 %] 40 %  Physical Exam  Constitutional:       General: He is unresponsive to verbal stimulus.  Minimally responsive to tactile stimulus and pain.     Appearance: He is ill-appearing.      Interventions: He is unresponsive except to painful stimulus.  Trach in place with ventilator support.  Tube feedings initiated through PEG tube.     Comments: Unable to follow commands.  Does not respond to verbal stimulus.   HENT:      Head: Normocephalic.      Comments: Postop incision almost completely healed.     Right Ear: External ear normal.      Left Ear: External ear normal.      Mouth: Mucous membranes are dry.      Pharynx: Oropharynx is clear.  Trach in place with slightly bloody bandages noted.  Eyes:       General: No scleral icterus.     Conjunctiva/sclera: Conjunctivae normal.   Cardiovascular:      Rate and Rhythm: Normal rate. Rhythm irregularly irregular.      Pulses: Normal pulses.      Heart sounds: Normal heart sounds. No murmur heard.  Pulmonary:      Effort: He is on ventilator support.     Breath sounds: Decreased breath sounds present.   Abdominal:      General: Bowel sounds are normal.      Palpations: Abdomen is soft. There is no mass.  PEG tube in place with tube feedings ongoing.  Musculoskeletal:      Right lower leg: No edema.      Left lower leg: No edema.   Skin:     General: Skin is warm and dry.   Neurological:      Comments: Does not follow commands.  Responds only to painful stimuli.      Results Review:  I have reviewed the labs, radiology results, and diagnostic studies.    Laboratory Data:   Results from last 7 days   Lab Units 04/23/23  2354   WBC 10*3/mm3 10.38   HEMOGLOBIN g/dL 10.0*   HEMATOCRIT % 33.1*   PLATELETS 10*3/mm3 245        Results from last 7 days   Lab Units 04/26/23  0300 04/25/23  0236 04/24/23  0225   SODIUM mmol/L 140 136 139   POTASSIUM mmol/L 4.0 3.8 3.8   CHLORIDE mmol/L 99 96* 99   CO2 mmol/L 33.0* 32.0* 30.0*   BUN mg/dL 28* 30* 33*   CREATININE mg/dL 1.07 1.03 1.11   CALCIUM mg/dL 8.5* 8.2* 8.3*   BILIRUBIN mg/dL 0.6 0.3 0.3   ALK PHOS U/L 100 115 106   ALT (SGPT) U/L 17 20 20   AST (SGOT) U/L 17 18 15   GLUCOSE mg/dL 97 199* 231*       Culture Data:   No results found for: BLOODCX, URINECX, WOUNDCX, MRSACX, RESPCX, STOOLCX    Radiology Data:   Imaging Results (Last 24 Hours)     Procedure Component Value Units Date/Time    XR Chest 1 View [[id number]] Collected: 04/26/23 0738     Updated: 04/26/23 0742    Narrative:      HISTORY: Intubated     CXR: A frontal view the chest obtained     COMPARISON: 04/25/2023     FINDINGS: New tracheostomy tube appears appropriate in position. A right  IJ central line tip projects near the atrial caval junction. Enteric  tube  is been removed.     Diminished level of inspiration. Stable prominence of the cardiac  silhouette with questionable improving pulmonary edema. Left lower lobe  consolidation. Trace pleural effusions. No pneumothorax.       Impression:      1. New tracheostomy tube appropriate in position. Similar positioning of  the right IJ central line.  2. Mild cardiomegaly with mild improving pulmonary edema considered with  persistent left basilar consolidation. Probable trace pleural effusions.  This report was finalized on 04/26/2023 07:39 by Dr. Coleen Terry MD.    XR Abdomen 2+ VW with Chest 1 VW [[id number]] Collected: 04/25/23 1417     Updated: 04/25/23 1422    Narrative:      EXAMINATION: Chest one view and abdomen 2 view 04/25/2023     HISTORY: New tracheostomy     FINDINGS: A tracheostomy has been placed and projects over the midline  of the trachea. The lungs are hypoventilated. There is consolidation  within both lungs with an element of volume overload suspected with  widening of the vascular pedicle. There is no pneumothorax or  pneumomediastinum. Small effusions are present. A right IJ deep line  remains in place.     Supine and upright films of the abdomen demonstrate a nonspecific bowel  gas pattern with mild gaseous distention of several small bowel loops  within the midabdomen. A gastrostomy tube is in place.       Impression:      1.. Tracheostomy tube has been placed. No evidence of complication.  2. Poor inspiratory effort. There is evidence of volume overload with  widening of the vascular pedicle and vascular congestion. There is  increased consolidation within both lungs suggesting either interval  development of pulmonary edema or infiltrate. Small effusions are  present.  3. Mild gaseous distention of several small bowel loops within the  midabdomen. A gastrostomy tube is in place. No free air is demonstrated.  This report was finalized on 04/25/2023 14:19 by Dr. Zack Mendoza MD.          I  have reviewed the patient's current medications.     Assessment/Plan   Assessment  Active Hospital Problems    Diagnosis    • **Swelling of scalp    • Elevated liver function tests    • COPD (chronic obstructive pulmonary disease)    • Obesity (BMI 30-39.9)    • Tobacco abuse, in remission    • Acute systolic heart failure    • ADDIE (obstructive sleep apnea)    • Type 2 myocardial infarction due to arrhythmia    • Acute pulmonary edema due to A-fib RVR    • Acute respiratory failure with hypoxia    • Intracranial epidural hematoma    • Post-operative infection    • Paroxysmal atrial fibrillation with rapid ventricular response    • Type 2 diabetes mellitus with hyperglycemia and peripheral neuropathy, with long-term current use of insulin (HCC)    • Coronary artery disease    • Meningioma s/p craniotomy        Treatment Plan  Continue long-acting insulin  Ventilator management per pulmonology  Tube feedings reinitiated per general surgery today  Potential LTAC placement if patient fails weaning trials once again    Medical Decision Making  Number and Complexity of problems:   1) exacerbation of respiratory failure with hypoxia secondary to pulmonary edema, acute, high complexity  2) PAF with RVR, acute, moderate complexity  3) meningioma versus intracranial infection, acute, high complexity  4) type 2 diabetes, chronic, moderate complexity  5) elevated liver enzymes acute moderate complexity  6) elevated WBC acute moderate complexity    Differential Diagnosis: Pulmonary embolus, LV dysfunction     Conditions and Status        Condition is unchanged.     MDM Data  External documents reviewed: No new data  Cardiac tracing (EKG, telemetry) interpretation: A-fib rate controlled  Radiology interpretation: Chest x-ray has increased interstitial markings consistent with fluid overload.  Labs reviewed:  reviewed  Any tests that were considered but not ordered: None     Decision rules/scores evaluated (example [id number],  Tyrel, etc): [id number] score of 5 with a 7.2% risk of stroke annually     Discussed with:  nursing      Care Planning  Shared decision making: Patient is sedated  Code status and discussions: full     Disposition  Social Determinants of Health that impact treatment or disposition: none  I expect the patient to be discharged by the primary service.    Electronically signed by Ramin Singh DO, 04/26/23, 11:20 CDT.

## 2023-04-26 NOTE — PROGRESS NOTES
Neurosurgery Daily Progress Note    HPI:  Elijah Escobar is a 67 y.o. male with a significant medical history of hypertension, diabetes, hyperlipidemia, seizures, craniotomy for tumor (6/16/2022), craniotomy for washout (7/1/2022), craniectomy (10/4/2022), coronary artery disease, diabetes, erectile dysfunction, GERD, hypertension, hyperlipidemia, tobacco abuse, and obesity.  He presents today with a complaint of a 4-5 days onset of progressively worsening right frontal, temporal, and periorbital edema and associated symptoms to include, but not limited to generalized fatigue, chills, dyspnea, intermittent nausea without vomiting, and states he's felt feverish.  Physical exam findings of neurologically intact with right frontal, temporal, and periorbital swelling.  WBC elevated at 15.  3 to an CRP elevated at 14.75.  Imaging pending.    Assessment:   Past Medical History:   Diagnosis Date   • Brain tumor    • Coronary artery disease    • COVID-19 vaccine series completed     MODERNA X 3; LAST DOSE 3/2022   • Diabetes    • Erectile dysfunction    • GERD (gastroesophageal reflux disease)    • Hypercholesteremia    • Hypertension    • Seizure      Active Hospital Problems    Diagnosis    • **Swelling of scalp    • Elevated liver function tests    • COPD (chronic obstructive pulmonary disease)    • Obesity (BMI 30-39.9)    • Tobacco abuse, in remission    • Acute systolic heart failure    • ADDIE (obstructive sleep apnea)    • Type 2 myocardial infarction due to arrhythmia    • Acute pulmonary edema due to A-fib RVR    • Acute respiratory failure with hypoxia    • Intracranial epidural hematoma    • Post-operative infection    • Paroxysmal atrial fibrillation with rapid ventricular response    • Type 2 diabetes mellitus with hyperglycemia and peripheral neuropathy, with long-term current use of insulin (HCC)    • Coronary artery disease    • Meningioma s/p craniotomy      Plan:   Neuro:   Grimaces to pain.   Intermittently opens eyes to voice but does not follow commands.  Infected cranial flap      POD #21 (4/4/2023)  craniectomy and washout    Postop code CT and MRI stable. No acute abnormalities.    Flap tapped I&D cultures 4+ gram-negative bacilli    Heavy 4+ Serratia marcescens     POD #14 (4/13/2023) evacuation of scalp hematoma    JUAN removed     Continue neuro exams per policy.  Call for decline  EEG shows no epileptiform activity or ongoing seizures    1 Day Post-Op tracheostomy and PEG tube placement.    Appreciate ENT and general surgery    CV: Code for PEA, 4/16/2023.  Required epinephrine and bicarb   AFib/flutter, rate controlled.   Hold all anticoagulation for 2 weeks.   No evidence of DVT or PE per imaging   Appreciate cardiology  Pulm: Ventilation per trach    : DC Jeong catheter  FEN: Enteral feeding per PEG tube  Endocrine: Continue SSI  GI: SHERIF.  +BM  ID:  Infected cranial flap  CSF: no growth to date   PNA.  Resolved   UTI.  Resolved   Continue antibiotics.  Remains on Levaquin   Heme:  DVT prophylaxis with SCDs.  Hold all anticoagulation for 2 weeks.  Pain: No complaints at present  Dispo: PT/OT   No change in neuro status in 48 hours proceed with palliative care consult     Chief complaint:   4-5 days onset of progressively worsening right frontal, temporal, and periorbital edema and associated symptoms to include, but not limited to generalized fatigue, chills, dyspnea, intermittent nausea without vomiting, and states he's felt feverish.    HPI  Subjective:  Symptoms:  Stable.      Temp:  [98.5 °F (36.9 °C)-98.6 °F (37 °C)] 98.5 °F (36.9 °C)  Heart Rate:  [] 99  Resp:  [12-22] 19  BP: (101-151)/(65-86) 122/86  FiO2 (%):  [30 %-40 %] 40 %    Output by Drain (mL) 04/25/23 0701 - 04/25/23 1900 04/25/23 1901 - 04/26/23 0700 04/26/23 0701 - 04/26/23 0817 Range Total   Requested LDAs do not have output data documented.     Objective:  Vital signs: (most recent): Blood pressure 122/86, pulse 99,  "temperature 98.5 °F (36.9 °C), temperature source Axillary, resp. rate 19, height 177.8 cm (70\"), weight 122 kg (268 lb 3.2 oz), SpO2 95 %.      Neurologic Exam     Mental Status     Grimaces to pain.  Intermittently opens eyes to voice but does not follow commands.       Cranial Nerves     CN III, IV, VI   Pupils are equal, round, and reactive to light.    CN VII   Right facial weakness: none  Left facial weakness: none    CN IX, X   Right gag reflex: normal  Left gag reflex: normal    Motor Exam   Intermittently regards to painful stimuli to the upper extremities and withdraws to painful stimuli to lower extremities     Drains: * No LDAs found *    Imaging Results (Last 24 Hours)     Procedure Component Value Units Date/Time    XR Chest 1 View [[id number]] Collected: 04/26/23 0738     Updated: 04/26/23 0742    Narrative:      HISTORY: Intubated     CXR: A frontal view the chest obtained     COMPARISON: 04/25/2023     FINDINGS: New tracheostomy tube appears appropriate in position. A right  IJ central line tip projects near the atrial caval junction. Enteric  tube is been removed.     Diminished level of inspiration. Stable prominence of the cardiac  silhouette with questionable improving pulmonary edema. Left lower lobe  consolidation. Trace pleural effusions. No pneumothorax.       Impression:      1. New tracheostomy tube appropriate in position. Similar positioning of  the right IJ central line.  2. Mild cardiomegaly with mild improving pulmonary edema considered with  persistent left basilar consolidation. Probable trace pleural effusions.  This report was finalized on 04/26/2023 07:39 by Dr. Coleen Terry MD.    XR Abdomen 2+ VW with Chest 1 VW [[id number]] Collected: 04/25/23 1417     Updated: 04/25/23 1422    Narrative:      EXAMINATION: Chest one view and abdomen 2 view 04/25/2023     HISTORY: New tracheostomy     FINDINGS: A tracheostomy has been placed and projects over the midline  of the trachea. The " lungs are hypoventilated. There is consolidation  within both lungs with an element of volume overload suspected with  widening of the vascular pedicle. There is no pneumothorax or  pneumomediastinum. Small effusions are present. A right IJ deep line  remains in place.     Supine and upright films of the abdomen demonstrate a nonspecific bowel  gas pattern with mild gaseous distention of several small bowel loops  within the midabdomen. A gastrostomy tube is in place.       Impression:      1.. Tracheostomy tube has been placed. No evidence of complication.  2. Poor inspiratory effort. There is evidence of volume overload with  widening of the vascular pedicle and vascular congestion. There is  increased consolidation within both lungs suggesting either interval  development of pulmonary edema or infiltrate. Small effusions are  present.  3. Mild gaseous distention of several small bowel loops within the  midabdomen. A gastrostomy tube is in place. No free air is demonstrated.  This report was finalized on 04/25/2023 14:19 by Dr. Zack Mendoza MD.        Lab Results (last 24 hours)     Procedure Component Value Units Date/Time    POC Glucose Once [[id number]]  (Normal) Collected: 04/26/23 0646    Specimen: Blood Updated: 04/26/23 0656     Glucose 112 mg/dL      Comment: : [id number] Piscatelle GinaMeter ID: [id number]       Comprehensive Metabolic Panel [[id number]]  (Abnormal) Collected: 04/26/23 0300    Specimen: Blood Updated: 04/26/23 0418     Glucose 97 mg/dL      BUN 28 mg/dL      Creatinine 1.07 mg/dL      Sodium 140 mmol/L      Potassium 4.0 mmol/L      Chloride 99 mmol/L      CO2 33.0 mmol/L      Calcium 8.5 mg/dL      Total Protein 6.0 g/dL      Albumin 3.0 g/dL      ALT (SGPT) 17 U/L      AST (SGOT) 17 U/L      Alkaline Phosphatase 100 U/L      Total Bilirubin 0.6 mg/dL      Globulin 3.0 gm/dL      A/G Ratio 1.0 g/dL      BUN/Creatinine Ratio 26.2     Anion Gap 8.0 mmol/L      eGFR 76.1 mL/min/1.73      Narrative:      GFR Normal >60  Chronic Kidney Disease <60  Kidney Failure <15      Blood Gas, Arterial - [[id number]]  (Abnormal) Collected: 04/26/23 0343    Specimen: Arterial Blood Updated: 04/26/23 0342     Site Right Radial     Marek's Test Positive     pH, Arterial 7.481 pH units      Comment: 83 Value above reference range        pCO2, Arterial 46.0 mm Hg      Comment: 83 Value above reference range        pO2, Arterial 68.2 mm Hg      Comment: 84 Value below reference range        HCO3, Arterial 34.3 mmol/L      Comment: 83 Value above reference range        Base Excess, Arterial 9.7 mmol/L      Comment: 83 Value above reference range        O2 Saturation, Arterial 94.6 %      Temperature 37.0 C      Barometric Pressure for Blood Gas 752 mmHg      Modality Ventilator     FIO2 30 %      Ventilator Mode AC     Set Tidal Volume 500     Set Mech Resp Rate 12.0     PEEP 7.0     Collected by [id number]     Comment: Meter: [id number]     :  [id number]        pCO2, Temperature Corrected 46.0 mm Hg      pH, Temp Corrected 7.481 pH Units      pO2, Temperature Corrected 68.2 mm Hg     POC Glucose Once [[id number]]  (Normal) Collected: 04/26/23 0011    Specimen: Blood Updated: 04/26/23 0022     Glucose 71 mg/dL      Comment: : [id number] Raúl GinaMeter ID: [id number]       POC Glucose Once [[id number]]  (Normal) Collected: 04/25/23 2115    Specimen: Blood Updated: 04/25/23 2126     Glucose 81 mg/dL      Comment: : [email or web address] WangMeter ID: [id number]       POC Glucose Once [[id number]]  (Normal) Collected: 04/25/23 1705    Specimen: Blood Updated: 04/25/23 1716     Glucose 109 mg/dL      Comment: : [email or web address] Real WangMeter ID: [id number]       POC Glucose Once [[id number]]  (Abnormal) Collected: 04/25/23 1139    Specimen: Blood Updated: 04/25/23 1150     Glucose 179 mg/dL      Comment: : [email or web address] Real WangMeter ID: [id number]           ADITYA Cespedes

## 2023-04-26 NOTE — PROGRESS NOTES
[facility] [place] PULMONARY AND CRITICAL CARE PROGRESS NOTE - [facility]    Patient: Elijah Escobar    1955    MR# [id number]    Acct# [id number]  04/25/23   19:14 CDT  Referring Provider: Flaco Portillo, *    Chief Complaint: Mechanically ventilated/acute respiratory failure    Interval history:   This patient has been noted to be maintained on mechanical ventilation.  Upon initial evaluation.  He was maintained on assist-control 12 tidal volume 500 FiO2 40% PEEP +5.  He has been maintaining the oxygen saturation at 97%.  The patient was planned for PEG and trach today.  Which she has undergone these procedures.  Since he came back from the surgery.  The patient was noted to be having hypoxemia.  And he was having increased oxygen requirements.  That has resulted in increasing his PEEP.  We have obtained a chest x-ray on him.  There is bilateral basal lung atelectasis patient on the right side.  The tracheostomy tube was noted to be 8.5 Shiley tube.  Which was the largest available.  Even that was noted some leakage.  Patient was however sedated.  And not agitated.  Meds:  aspirin, 81 mg, Oral, Daily  atorvastatin, 20 mg, Oral, Nightly  budesonide, 0.5 mg, Nebulization, BID - RT  Chlorhexidine Gluconate Cloth, 1 application, Topical, Q24H  digoxin, 250 mcg, Oral, Daily  dilTIAZem, 90 mg, Oral, Q8H  furosemide, 40 mg, Intravenous, BID  gabapentin, 300 mg, Oral, Q8H  insulin detemir, 25 Units, Subcutaneous, Q12H  insulin lispro, 0-14 Units, Subcutaneous, Q6H  ipratropium, 0.5 mg, Nebulization, 4x Daily - RT  levETIRAcetam, 500 mg, Oral, BID  levoFLOXacin, 500 mg, Oral, Q24H  methylnaltrexone, 12 mg, Subcutaneous, Every Other Day  metoclopramide, 10 mg, Oral, 4x Daily AC & at Bedtime  metoprolol tartrate, 50 mg, Oral, Q12H  multivitamin with minerals, 1 tablet, Oral, Daily  pantoprazole, 40 mg, Intravenous, Q AM  polyethylene glycol, 17 g, Oral, Daily  sennosides-docusate, 1 tablet, Oral,  Daily  sodium chloride, 10 mL, Intravenous, Q12H  sucralfate, 1 g, Oral, 4x Daily AC & at Bedtime      propofol, 5-50 mcg/kg/min, Last Rate: 5 mcg/kg/min (04/23/23 0253)        Ventilator Settings:        Resp Rate (Set): 12  Pressure Support (cm H2O): 5 cm H20  FiO2 (%): 30 %  PEEP/CPAP (cm H2O): 7 cm H20  Minute Ventilation (L/min) (Obs): 9.78 L/min  Resp Rate (Observed) Vent: 19  I:E Ratio (Set): 1:4.45  I:E Ratio (Obs): 1:3.5  PIP Observed (cm H2O): 18 cm H2O     Physical Exam:  Temp:  [98.3 °F (36.8 °C)-99 °F (37.2 °C)] 98.5 °F (36.9 °C)  Heart Rate:  [] 77  Resp:  [12-26] 22  BP: (102-151)/(65-95) 102/65  FiO2 (%):  [30 %-40 %] 30 %    Intake/Output Summary (Last 24 hours) at 4/25/2023 1914  Last data filed at 4/25/2023 1500  Gross per 24 hour   Intake 1518 ml   Output 1850 ml   Net -332 ml     SpO2 Percentage    04/25/23 1500 04/25/23 1510 04/25/23 1700   SpO2: 96% 98% 94%   Body mass index is 38.48 kg/m².   Physical Exam  Vitals and nursing note reviewed.        Constitutional:       Appearance: He is obese. He is ill-appearing.  Patient is s/p tracheostomy.  8.5 Shiley was placed cuffed nonfenestrated.  HENT:      Head: Normocephalic. Right scalp wound, well-healed, no redness warmth or drainage     Nose: Nose normal.      Mouth/Throat: ETT/OG/TF  Eyes:      General:         Right eye: No discharge.         Left eye: No discharge.   Cardiovascular:      Rate and Rhythm: tachycardia   Pulmonary:      Effort: Tachypnea was present.     Breath sounds: Bilateral entry was present however diminished on the basis sounds.  Few rhonchi noted to be scattered.  Abdominal:      General: Abdomen is flat.      Palpations: Abdomen is soft.    Musculoskeletal:      General: Bilateral wrist restraints, SCDs      Right lower leg: Edema      Left lower leg: Edema   Skin:     General: Skin is warm and dry.      Coloration: Skin is not jaundiced or pale.   Neurological:      General: Intubated      Electronically signed  "by Espinoza Arreola MD, 4/25/2023, 19:14 CDT      Physician substantive portion: medical decision making  Result Review  Laboratory Data:  Results from last 7 days   Lab Units 04/23/23  2354 04/19/23  0657   WBC 10*3/mm3 10.38 13.61*   HEMOGLOBIN g/dL 10.0* 10.2*   PLATELETS 10*3/mm3 245 163     Results from last 7 days   Lab Units 04/25/23  0236 04/24/23  0225 04/23/23  2354 04/23/23  0321   SODIUM mmol/L 136 139  --  140   POTASSIUM mmol/L 3.8 3.8  --  4.2   CO2 mmol/L 32.0* 30.0*  --  29.0   BUN mg/dL 30* 33*  --  35*   CREATININE mg/dL 1.03 1.11  --  1.13   CRP mg/dL  --   --  2.75*  --      Results from last 7 days   Lab Units 04/25/23  0357 04/24/23  0347 04/23/23  0440   PH, ARTERIAL pH units 7.467* 7.512* 7.465*   PCO2, ARTERIAL mm Hg 45.7* 40.8 44.6   PO2 ART mm Hg 74.1* 77.4* 62.0*   FIO2 % 30 30 40     Microbiology Results (last 10 days)     Procedure Component Value - Date/Time    Chlamydia trachomatis, Neisseria gonorrhoeae, PCR - Swab, Penis [[id number]]  (Normal) Collected: 04/19/23 0936    Lab Status: Final result Specimen: Swab from Penis Updated: 04/19/23 1425     Chlamydia DNA by PCR Not Detected     Neisseria gonorrhoeae by PCR Not Detected    Narrative:      Disclaimer: The Aptima Combo 2 assay is a target amplification nucleic acid probe test that utilizes target capture for the in vitro qualitative detection and differentiation of ribosomal RNA from Chlamydia trachomatis and Neisseria gonorrhoeae to aid in the diagnosis of chlamydial and/or gonococcal urogenital disease.  Cell culture was once considered to be the \"gold standard\" for detection of CT and NG.  Culture is quite specific, but scientific studies have demonstrated that the NAAT DNA probe technologies have higher clinical sensitivities than culture.    Genital Culture - Swab, Penis [[id number]] Collected: 04/19/23 0936    Lab Status: Final result Specimen: Swab from Penis Updated: 04/22/23 0549     Genital Culture No growth at 3 days     " Gram Stain Few (2+) WBCs seen      No organisms seen    Urine Culture - Urine, Indwelling Urethral Catheter [[id number]]  (Normal) Collected: 04/19/23 0307    Lab Status: Final result Specimen: Urine from Indwelling Urethral Catheter Updated: 04/20/23 1207     Urine Culture No growth    Blood Culture - Blood, Hand, Right [[id number]]  (Normal) Collected: 04/19/23 0209    Lab Status: Final result Specimen: Blood from Hand, Right Updated: 04/24/23 0230     Blood Culture No growth at 5 days    Blood Culture - Blood, Hand, Left [[id number]]  (Normal) Collected: 04/19/23 0207    Lab Status: Final result Specimen: Blood from Hand, Left Updated: 04/24/23 0230     Blood Culture No growth at 5 days         Recent films:  XR Abdomen 2+ VW with Chest 1 VW    Result Date: 4/25/2023  EXAMINATION: Chest one view and abdomen 2 view 04/25/2023  HISTORY: New tracheostomy  FINDINGS: A tracheostomy has been placed and projects over the midline of the trachea. The lungs are hypoventilated. There is consolidation within both lungs with an element of volume overload suspected with widening of the vascular pedicle. There is no pneumothorax or pneumomediastinum. Small effusions are present. A right IJ deep line remains in place.  Supine and upright films of the abdomen demonstrate a nonspecific bowel gas pattern with mild gaseous distention of several small bowel loops within the midabdomen. A gastrostomy tube is in place.      Impression: 1.. Tracheostomy tube has been placed. No evidence of complication. 2. Poor inspiratory effort. There is evidence of volume overload with widening of the vascular pedicle and vascular congestion. There is increased consolidation within both lungs suggesting either interval development of pulmonary edema or infiltrate. Small effusions are present. 3. Mild gaseous distention of several small bowel loops within the midabdomen. A gastrostomy tube is in place. No free air is demonstrated. This report was  finalized on 04/25/2023 14:19 by Dr. Zack Mendoza MD.    XR Chest 1 View    Result Date: 4/25/2023  HISTORY: Intubated  CXR: A frontal view the chest obtained  COMPARISON: 04/24/2023  FINDINGS: Endotracheal tube remains appropriate in position. Enteric tube descends into the abdomen. Right IJ central line tip projects near the atrial caval junction. Similar diffuse bilateral pulmonary opacities with likely small layering pleural effusions. No pneumothorax. Stable mediastinal contours.      Impression: 1. Stable 1 day view the chest. This report was finalized on 04/25/2023 07:21 by Dr. Coleen Terry MD.    XR Chest 1 View    Result Date: 4/24/2023  EXAMINATION: XR CHEST 1 VW- 4/24/2023 7:45 AM CDT  HISTORY: Intubated and sedated; S06.4X0A-Epidural hemorrhage without loss of consciousness, initial encounter; Z74.09-Other reduced mobility; Z98.890-Other specified postprocedural states; T81.42XA-Infection following a procedure, deep incisional surgical site, initial encounter; 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  COMPARISON: Chest x-ray 4/23/2020 1044 hours.  Endotracheal tube, NG tube and right internal jugular central line appear in satisfactory position as before. There is volume loss in the lung bases, with mild central vascular congestion, and probable volume overload, which appears improved. There are persistent patchy subhilar airspace opacities greater on the right extending into the right lateral base, without lobar consolidation. No pneumothorax or pleural effusion is identified.      Impression: 1. Stable satisfactory position of multiple life-support lines. 2. Mild improvement in pulmonary edema and volume overload. This report was finalized on 04/24/2023 07:51 by Dr. Elijah Grover MD.     Personal review of imaging: CXR shows Reviewed current imaging study.  It showed increased  interstitial opacities and vascular congestion likely from pulm edema.  No lung consolidation.  Tubes and lines are in position.      Pulmonary Assessment:  1. Acute on chronic hypoxic respiratory failure on mechanical ventilation/s/p tracheostomy PEG tube today.  2. Chronic congestive diastolic heart failure  3. Pulmonary infiltrate likely pneumonia versus atelectasis.    4. Atrial fibrillation  5. COPD with history of tobacco abuse  6. Meningioma s/p craniotomy, cranioplasty and flap infection.    7. Encephalopathy/patient not showing signs of CNS responsiveness.  8. Hypertension  9. Diabetes mellitus insulin-dependent  10. Coronary artery disease  11. History obstructive sleep apnea syndrome.  12. Seizure disorder  13. Chronic pain    Recommend/plan:   · Patient been seen and evaluated today.  Critical care time provided to the patient was more than 2 hours.  On separate occasions.  · Patient developed hypoxemia.  We had to improving increase the PEEP to 8.  In order to maintain oxygen saturation.  · This is believed to be due to the atelectasis.  · We will maintain the patient on assist-control mode.  FiO2 was increased to 50%.  Additionally.  · Currently the treatment and management of this patient will be coordinated with the other attendings the case.  ID is already on follow-up on this patient.  · We will obtain repeat CRP and procalcitonin.  In addition to BNP for tomorrow morning.  · Chest x-ray post tracheostomy has been reviewed.  And reported above.   Continue current ventilator settings.  Patient failed spontaneous breathing trial.  We will try again when he is more stable    The patient has been seen by evaluated by Dr. Arreola in the intensive care unit.  Total critical care time provided to the patient estimated to be  minutes minutes.        Electronically signed by     Espinoza Arreola MD,  Pulmonologist/Intensivist   4/25/2023, 19:14 CDT

## 2023-04-26 NOTE — THERAPY TREATMENT NOTE
Acute Care - Physical Therapy Treatment Note  [facility]     Patient Name: Elijah Escobar  : 1955  MRN: [id number]  Today's Date: 2023      Visit Dx:     ICD-10-CM ICD-9-CM   1. Intracranial epidural hematoma  S06.4X0A 852.40   2. Impaired mobility  Z74.09 799.89   3. S/P craniotomy  Z98.890 V45.89   4. Infection of deep incisional surgical site after procedure, initial encounter  T81.42XA 998.59   5. Meningioma s/p craniotomy  D32.9 225.2   6. Type 2 diabetes mellitus with hyperglycemia and peripheral neuropathy, with long-term current use of insulin (HCC)  E11.65 250.00    Z79.4 790.29     V58.67   7. Paroxysmal atrial fibrillation with rapid ventricular response  I48.0 427.31   8. Swelling of scalp  R22.0 784.2   9. Acute pulmonary edema due to A-fib RVR  J81.0 518.4   10. Acute respiratory failure with hypoxia  J96.01 518.81   11. Type 2 myocardial infarction due to arrhythmia  I49.9 427.9    I21.A1 410.90   12. Obesity (BMI 30-39.9)  E66.9 278.00   13. Tobacco abuse, in remission  F17.201 305.1   14. Acute systolic heart failure  I50.21 428.21   15. ADDIE (obstructive sleep apnea)  G47.33 327.23   16. Secondary hypertension  I15.9 405.99   17. Gastroesophageal reflux disease, unspecified whether esophagitis present  K21.9 530.81   18. Class 2 severe obesity due to excess calories with serious comorbidity and body mass index (BMI) of 38.0 to 38.9 in adult  E66.01 278.01    Z68.38 V85.38   19. Leukocytosis, unspecified type  D72.829 288.60   20. Anemia due to other cause, not classified  D64.89 285.8   21. Smoker  F17.200 305.1   22. History of cranioplasty  Z98.890 V45.89   23. Facial edema  R60.0 782.3   24. Coronary artery disease due to lipid rich plaque  I25.10 414.00    I25.83 414.3   25. Postoperative infection, unspecified type, initial encounter  T81.40XA 998.59   26. Chronic obstructive pulmonary disease, unspecified COPD type  J44.9 496   27. Respiratory insufficiency  R06.89 786.09      Patient Active Problem List   Diagnosis   • Meningioma s/p craniotomy   • Hypertension   • GERD (gastroesophageal reflux disease)   • Coronary artery disease   • Class 2 severe obesity due to excess calories with serious comorbidity and body mass index (BMI) of 38.0 to 38.9 in adult   • Type 2 diabetes mellitus with hyperglycemia and peripheral neuropathy, with long-term current use of insulin (HCC)   • Leukocytosis   • Other specified anemias   • Paroxysmal atrial fibrillation with rapid ventricular response   • Post-operative infection   • Smoker   • History of cranioplasty   • Facial edema   • Swelling of scalp   • Acute pulmonary edema due to A-fib RVR   • Acute respiratory failure with hypoxia   • Type 2 myocardial infarction due to arrhythmia   • COPD (chronic obstructive pulmonary disease)   • Obesity (BMI 30-39.9)   • Tobacco abuse, in remission   • Acute systolic heart failure   • ADDIE (obstructive sleep apnea)   • Intracranial epidural hematoma   • Elevated liver function tests     Past Medical History:   Diagnosis Date   • Brain tumor    • Coronary artery disease    • COVID-19 vaccine series completed     MODERNA X 3; LAST DOSE 3/2022   • Diabetes    • Erectile dysfunction    • GERD (gastroesophageal reflux disease)    • Hypercholesteremia    • Hypertension    • Seizure      Past Surgical History:   Procedure Laterality Date   • BALLOON ANGIOPLASTY, ARTERY Right    • COLONOSCOPY     • CRANIECTOMY Right 7/1/2022    Procedure: CRANIECTOMY WITH INCISIONAL WASHOUT;  Surgeon: Felipe Banerjee MD;  Location:  [facility];  Service: Neurosurgery;  Laterality: Right;   • CRANIOPLASTY Right 10/4/2022    Procedure: CRANIOPLASTY right with Lumbar drain;  Surgeon: Flaco Portillo MD;  Location:  [facility];  Service: Neurosurgery;  Laterality: Right;   • CRANIOPLASTY N/A 4/4/2023    Procedure: CRANIOPLASTY, removal, right, horseshoe;  Surgeon: Flaco Portillo MD;  Location:  [facility];  Service:  Neurosurgery;  Laterality: N/A;   • CRANIOTOMY Right 4/12/2023    Procedure: CRANIOTOMY; evacuation of right hematoma;  Surgeon: Flaco Portillo MD;  Location:  [facility];  Service: Neurosurgery;  Laterality: Right;   • CRANIOTOMY FOR TUMOR Right 6/16/2022    Procedure: CRANIOTOMY FOR TUMOR STERIOTACTIC WITH BRAIN LAB, right;  Surgeon: Flaco Portillo MD;  Location:  [facility];  Service: Neurosurgery;  Laterality: Right;   • ENDOSCOPY WITH GASTROSTOMY TUBE INSERTION N/A 4/25/2023    Procedure: ESOPHAGOGASTRODUODENOSCOPY WITH GASTROSTOMY TUBE INSERTION;  Surgeon: Geeta Abdalla MD;  Location:  [facility];  Service: General;  Laterality: N/A;   • TRACHEOSTOMY N/A 4/25/2023    Procedure: TRACHEOSTOMY;  Surgeon: Wagner Villarreal MD;  Location:  [facility];  Service: ENT;  Laterality: N/A;     PT Assessment (last 12 hours)     PT Evaluation and Treatment     Row Name 04/26/23 0827          Physical Therapy Time and Intention    Subjective Information no complaints  light sedation / vent  -JUAN     Document Type therapy note (daily note)  -JUAN     Mode of Treatment physical therapy  -JUAN     Row Name 04/26/23 0827          General Information    Existing Precautions/Restrictions fall;oxygen therapy device and L/min  helmet when OOB  -JUAN     Row Name 04/26/23 0827          Motor Skills    Comments, Therapeutic Exercise Prom x15 -20.  stretched B gastrocs  -JUAN     Row Name             Wound 04/04/23 1111 Right temporal region Incision    Wound - Properties Group Placement Date: 04/04/23  -CHRISTIANO Placement Time: 1111  -CHRISTIANO Side: Right  -CHRISTIANO Location: temporal region  -CHRISTIANO Primary Wound Type: Incision  -CHRISTIANO    Retired Wound - Properties Group Placement Date: 04/04/23  -CHRISTIANO Placement Time: 1111  -CHRISTIANO Side: Right  -CHRISTIANO Location: temporal region  -CHRISTIANO Primary Wound Type: Incision  -CHRISTIANO    Retired Wound - Properties Group Date first assessed: 04/04/23  -CHRISTIANO Time first assessed: 1111  -CHRISTIANO Side: Right  -CHRISTIANO Location: temporal  region  -CHRISTIANO Primary Wound Type: Incision  -CHRISTIANO    Row Name             Wound 04/12/23 1622 Right scalp Incision    Wound - Properties Group Placement Date: 04/12/23  -SAFIA Placement Time: 1622  -SAFIA Side: Right  -SAFIA Location: scalp  -SAFIA Primary Wound Type: Incision  -SAFIA    Retired Wound - Properties Group Placement Date: 04/12/23  -SAFIA Placement Time: 1622  -SAFIA Side: Right  -SAFIA Location: scalp  -SAFIA Primary Wound Type: Incision  -SAFIA    Retired Wound - Properties Group Date first assessed: 04/12/23  -SAFIA Time first assessed: 1622  -SAFIA Side: Right  -SAFIA Location: scalp  -SAFIA Primary Wound Type: Incision  -SAFIA    Row Name             Wound 04/25/23 1202 Left upper abdomen Puncture    Wound - Properties Group Placement Date: 04/25/23  -DT Placement Time: 1202  -DT Present on Hospital Admission: N  -DT Side: Left  -DT Orientation: upper  -DT Location: abdomen  -DT Primary Wound Type: Puncture  -DT    Retired Wound - Properties Group Placement Date: 04/25/23  -DT Placement Time: 1202  -DT Present on Hospital Admission: N  -DT Side: Left  -DT Orientation: upper  -DT Location: abdomen  -DT Primary Wound Type: Puncture  -DT    Retired Wound - Properties Group Date first assessed: 04/25/23  -DT Time first assessed: 1202  -DT Present on Hospital Admission: N  -DT Side: Left  -DT Location: abdomen  -DT Primary Wound Type: Puncture  -DT    Row Name             Wound 04/25/23 1240 anterior neck Incision    Wound - Properties Group Placement Date: 04/25/23  -DT Placement Time: 1240  -DT Present on Hospital Admission: N  -DT Orientation: anterior  -DT Location: neck  -DT Primary Wound Type: Incision  -DT    Retired Wound - Properties Group Placement Date: 04/25/23  -DT Placement Time: 1240  -DT Present on Hospital Admission: N  -DT Orientation: anterior  -DT Location: neck  -DT Primary Wound Type: Incision  -DT    Retired Wound - Properties Group Date first assessed: 04/25/23  -DT Time first assessed: 1240  -DT Present on Hospital Admission:  N  -DT Location: neck  -DT Primary Wound Type: Incision  -DT    Row Name 04/26/23 0827          Positioning and Restraints    Pre-Treatment Position in bed  -JUAN     Post Treatment Position bed  -JUAN     In Bed notified nsg;fowlers;call light within reach;side rails up x3;RUE elevated;LUE elevated;SCD pump applied;legs elevated;heels elevated  -JUAN           User Key  (r) = Recorded By, (t) = Taken By, (c) = Cosigned By    Initials Name Provider Type    Carrie Clark, KYLAH Physical Therapist Assistant    Kierra Conroy, RN Registered Nurse    Ricky George RN Registered Nurse    Eron Garduno RN Registered Nurse                Physical Therapy Education     Title: PT OT SLP Therapies (In Progress)     Topic: Physical Therapy (In Progress)     Point: Mobility training (In Progress)     Learning Progress Summary           Patient Acceptance, E,D, NL by JUAN at 4/26/2023 0950    Comment: Benefits of ROM. Patient is sedated    Acceptance, E,TB, VU by CR at 4/25/2023 0209    Acceptance, E,TB, VU by CR at 4/24/2023 0041    Acceptance, E, NR by JUAN at 4/11/2023 0809    Comment: Looking ahead during gait    Acceptance, E,D, DU,VU by JUAN at 4/10/2023 1447    Comment: Safety with transfers, please take time, no impulsiveness    Acceptance, E,TB, VU by CM at 4/6/2023 1350    Acceptance, E, VU by SB at 4/5/2023 1605    Comment: helmet fit, safety, POC    Acceptance, E, VU by CHRISTIANO at 4/3/2023 1353    Comment: gait, safety,    Acceptance, E, VU by SB at 4/1/2023 1405    Comment: pt edu on POC, benefits of act, PLB and d/c plans   Family Acceptance, E,TB, VU by CR at 4/25/2023 0209    Acceptance, E,TB, VU by CR at 4/24/2023 0041    Acceptance, E,TB, VU by CM at 4/6/2023 1350                   Point: Home exercise program (Done)     Learning Progress Summary           Patient Acceptance, E,TB, VU by CR at 4/25/2023 0209    Acceptance, E,TB, VU by CR at 4/24/2023 0041    Acceptance, E, NR by JESSICA at 4/19/2023 0815     Comment: progression of PT    Acceptance, E,TB, VU by CM at 4/6/2023 1350   Family Acceptance, E,TB, VU by CR at 4/25/2023 0209    Acceptance, E,TB, VU by CR at 4/24/2023 0041    Acceptance, E,TB, VU by CM at 4/6/2023 1350                   Point: Body mechanics (Done)     Learning Progress Summary           Patient Acceptance, E,TB, VU by CR at 4/25/2023 0209    Acceptance, E,TB, VU by CR at 4/24/2023 0041    Acceptance, E,TB, VU by CM at 4/6/2023 1350   Family Acceptance, E,TB, VU by CR at 4/25/2023 0209    Acceptance, E,TB, VU by CR at 4/24/2023 0041    Acceptance, E,TB, VU by CM at 4/6/2023 1350                   Point: Precautions (Done)     Learning Progress Summary           Patient Acceptance, E,TB, VU by CR at 4/25/2023 0209    Acceptance, E,TB, VU by CR at 4/24/2023 0041    Acceptance, E,TB, VU by CM at 4/6/2023 1350    Acceptance, E, VU by SB at 4/5/2023 1605    Comment: helmet fit, safety, POC    Acceptance, E, VU by SB at 4/1/2023 1405    Comment: pt edu on POC, benefits of act, PLB and d/c plans   Family Acceptance, E,TB, VU by CR at 4/25/2023 0209    Acceptance, E,TB, VU by CR at 4/24/2023 0041    Acceptance, E,TB, VU by CM at 4/6/2023 1350                               User Key     Initials Effective Dates Name Provider Type Discipline    JESSICA 02/03/23 -  Bruce Pillai, PT DPT Physical Therapist PT    JUAN 02/03/23 -  Carrie Callaway PTA Physical Therapist Assistant PT    CHRISTIANO 02/03/23 -  Александр Saravia, PTA Physical Therapist Assistant PT    CM 03/02/23 -  Dianelys Carlton, RN Registered Nurse Nurse    SB 06/16/21 -  Erika Javier, PT DPT Physical Therapist PT    CR 03/03/23 -  Janice Singh, RN Registered Nurse Nurse              PT Recommendation and Plan         Outcome Measures     Row Name 04/26/23 0900 04/25/23 0820 04/24/23 0830       How much help from another person do you currently need...    Turning from your back to your side while in flat bed without using bedrails? 1  -JUAN 1   -TB 1  -TB    Moving from lying on back to sitting on the side of a flat bed without bedrails? 1  -JUAN 1  -TB 1  -TB    Moving to and from a bed to a chair (including a wheelchair)? 1  -JUAN 1  -TB 1  -TB    Standing up from a chair using your arms (e.g., wheelchair, bedside chair)? 1  -JUAN 1  -TB 1  -TB    Climbing 3-5 steps with a railing? 1  -JUAN 1  -TB 1  -TB    To walk in hospital room? 1  -JUAN 1  -TB 1  -TB    AM-PAC 6 Clicks Score (PT) 6  -JUAN 6  -TB 6  -TB       Functional Assessment    Outcome Measure Options -- AM-PAC 6 Clicks Basic Mobility (PT)  -TB AM-PAC 6 Clicks Basic Mobility (PT)  -TB          User Key  (r) = Recorded By, (t) = Taken By, (c) = Cosigned By    Initials Name Provider Type    Carrie Clark PTA Physical Therapist Assistant    Tere Berger, PTA Physical Therapist Assistant                 Time Calculation:    PT Charges     Row Name 04/26/23 0951             Time Calculation    Start Time 0827  -JUAN      Stop Time 0853  -JUAN      Time Calculation (min) 26 min  -JUAN         Timed Charges    05078 - PT Therapeutic Exercise Minutes 26  -JUAN         Total Minutes    Timed Charges Total Minutes 26  -JUAN       Total Minutes 26  -JUAN            User Key  (r) = Recorded By, (t) = Taken By, (c) = Cosigned By    Initials Name Provider Type    Carrie lCark PTA Physical Therapist Assistant              Therapy Charges for Today     Code Description Service Date Service Provider Modifiers Qty    09705552725 HC PT THER PROC EA 15 MIN 4/26/2023 Carrie Callaway PTA GP 2          PT G-Codes  Outcome Measure Options: AM-PAC 6 Clicks Basic Mobility (PT)  AM-PAC 6 Clicks Score (PT): 6  AM-PAC 6 Clicks Score (OT): 20    Carrie Callaway PTA  4/26/2023

## 2023-04-27 PROBLEM — R06.81 CENTRAL APNEA: Status: ACTIVE | Noted: 2023-01-01

## 2023-04-27 NOTE — PROGRESS NOTES
Neurosurgery Daily Progress Note    HPI:  Elijah Escobar is a 67 y.o. male with a significant medical history of hypertension, diabetes, hyperlipidemia, seizures, craniotomy for tumor (6/16/2022), craniotomy for washout (7/1/2022), craniectomy (10/4/2022), coronary artery disease, diabetes, erectile dysfunction, GERD, hypertension, hyperlipidemia, tobacco abuse, and obesity.  He presents today with a complaint of a 4-5 days onset of progressively worsening right frontal, temporal, and periorbital edema and associated symptoms to include, but not limited to generalized fatigue, chills, dyspnea, intermittent nausea without vomiting, and states he's felt feverish.  Physical exam findings of neurologically intact with right frontal, temporal, and periorbital swelling.  WBC elevated at 15.  3 to an CRP elevated at 14.75.  Imaging pending.    Assessment:   Past Medical History:   Diagnosis Date   • Brain tumor    • Coronary artery disease    • COVID-19 vaccine series completed     MODERNA X 3; LAST DOSE 3/2022   • Diabetes    • Erectile dysfunction    • GERD (gastroesophageal reflux disease)    • Hypercholesteremia    • Hypertension    • Seizure      Active Hospital Problems    Diagnosis    • **Swelling of scalp    • Central apnea    • Elevated liver function tests    • COPD (chronic obstructive pulmonary disease)    • Obesity (BMI 30-39.9)    • Tobacco abuse, in remission    • Acute systolic heart failure    • ADDIE (obstructive sleep apnea)    • Type 2 myocardial infarction due to arrhythmia    • Acute pulmonary edema due to A-fib RVR    • Acute respiratory failure with hypoxia    • Intracranial epidural hematoma    • Post-operative infection    • Paroxysmal atrial fibrillation with rapid ventricular response    • Type 2 diabetes mellitus with hyperglycemia and peripheral neuropathy, with long-term current use of insulin (HCC)    • Coronary artery disease    • Meningioma s/p craniotomy      Plan:   Neuro:   Unchanged.   Opens eyes to voice.  Does not follow commands.  Minimal motor response.      Infected cranial flap      POD #21 (4/4/2023)  craniectomy and washout    Postop code CT and MRI stable. No acute abnormalities.    Flap tapped I&D cultures 4+ gram-negative bacilli    Heavy 4+ Serratia marcescens     POD #14 (4/13/2023) evacuation of scalp hematoma    JUAN removed     Continue neuro exams per policy.  Call for decline  EEG shows no epileptiform activity or ongoing seizures    2 Days Post-Op tracheostomy and PEG tube placement.    Appreciate ENT and general surgery    CV: Code for PEA, 4/16/2023.  Required epinephrine and bicarb   AFib/flutter, rate controlled.   Hold all anticoagulation for 2 weeks.   No evidence of DVT or PE per imaging   Appreciate cardiology  Pulm: Ventilation per trach    : DC Jeong catheter  FEN: Enteral feeding per PEG tube  Endocrine: Continue SSI  GI: SHERIF.  +BM  ID:  Infected cranial flap  CSF: no growth to date   PNA.  Resolved   UTI.  Resolved   Continue antibiotics.  Remains on Levaquin   Heme:  DVT prophylaxis with SCDs.  Hold all anticoagulation for 2 weeks.  Pain: No complaints at present  Dispo: PT/OT   No change in neuro status in 48 hours proceed with palliative care consult     Chief complaint:   4-5 days onset of progressively worsening right frontal, temporal, and periorbital edema and associated symptoms to include, but not limited to generalized fatigue, chills, dyspnea, intermittent nausea without vomiting, and states he's felt feverish.    HPI  Subjective:  Symptoms:  Stable.      Temp:  [98.3 °F (36.8 °C)-99.1 °F (37.3 °C)] 98.8 °F (37.1 °C)  Heart Rate:  [] 130  Resp:  [18-27] 25  BP: (100-146)/(60-91) 135/84  FiO2 (%):  [40 %] 40 %    Output by Drain (mL) 04/26/23 0701 - 04/26/23 1900 04/26/23 1901 - 04/27/23 0700 04/27/23 0701 - 04/27/23 1136 Range Total   Requested LDAs do not have output data documented.     Objective:  Vital signs: (most recent): Blood pressure 135/84,  "pulse (!) 130, temperature 98.8 °F (37.1 °C), temperature source Axillary, resp. rate 25, height 177.8 cm (70\"), weight 121 kg (266 lb 12.8 oz), SpO2 94 %.      Neurologic Exam     Mental Status     Opens eyes to voice.  Does not follow commands.       Cranial Nerves     CN III, IV, VI   Pupils are equal, round, and reactive to light.    CN VII   Right facial weakness: none  Left facial weakness: none    CN IX, X   Right gag reflex: normal  Left gag reflex: normal    Motor Exam   Minimal motor response to pain.       Drains: * No LDAs found *    Imaging Results (Last 24 Hours)     Procedure Component Value Units Date/Time    XR Chest 1 View [[id number]] Collected: 04/27/23 0729     Updated: 04/27/23 0733    Narrative:      HISTORY: Intubated and sedated     CXR: A frontal view the chest obtained     COMPARISON: 04/26/2023     FINDINGS: Tracheostomy tube remains appropriate in position. Right IJ  central line tip projects near The atrial caval junction. Hypoventilated  lungs. Prominent pulmonary vascular structures with left basilar  consolidation. Questionable small left pleural effusion. No  pneumothorax. Stable mediastinal contours.       Impression:      1. Stable 1 day view the chest.  This report was finalized on 04/27/2023 07:30 by Dr. Coleen Terry MD.        Lab Results (last 24 hours)     Procedure Component Value Units Date/Time    Blood Gas, Arterial - [[id number]]  (Abnormal) Collected: 04/27/23 0407    Specimen: Arterial Blood Updated: 04/27/23 0407     Site Left Radial     Marek's Test Positive     pH, Arterial 7.521 pH units      Comment: 83 Value above reference range        pCO2, Arterial 43.8 mm Hg      pO2, Arterial 96.5 mm Hg      HCO3, Arterial 35.8 mmol/L      Comment: 83 Value above reference range        Base Excess, Arterial 11.8 mmol/L      Comment: 83 Value above reference range        O2 Saturation, Arterial 98.8 %      Temperature 37.0 C      Barometric Pressure for Blood Gas 749 mmHg  "     Modality Ventilator     FIO2 40 %      Ventilator Mode AC     Set Tidal Volume 500     Set [facility] Resp Rate 12.0     PEEP 7.0     Collected by [id number]     Comment: Meter: [id number]     :  DCHESEBR        pCO2, Temperature Corrected 43.8 mm Hg      pH, Temp Corrected 7.521 pH Units      pO2, Temperature Corrected 96.5 mm Hg     Comprehensive Metabolic Panel [[id number]]  (Abnormal) Collected: 04/27/23 0223    Specimen: Blood Updated: 04/27/23 0339     Glucose 190 mg/dL      BUN 29 mg/dL      Creatinine 1.07 mg/dL      Sodium 141 mmol/L      Potassium 3.5 mmol/L      Chloride 100 mmol/L      CO2 33.0 mmol/L      Calcium 8.1 mg/dL      Total Protein 5.9 g/dL      Albumin 2.8 g/dL      ALT (SGPT) 12 U/L      AST (SGOT) 15 U/L      Alkaline Phosphatase 119 U/L      Total Bilirubin 0.5 mg/dL      Globulin 3.1 gm/dL      A/G Ratio 0.9 g/dL      BUN/Creatinine Ratio 27.1     Anion Gap 8.0 mmol/L      eGFR 76.1 mL/min/1.73     Narrative:      GFR Normal >60  Chronic Kidney Disease <60  Kidney Failure <15      POC Glucose Once [[id number]]  (Abnormal) Collected: 04/27/23 0017    Specimen: Blood Updated: 04/27/23 0028     Glucose 197 mg/dL      Comment: : gregoria Tim ThomasMeter ID: [id number]       POC Glucose Once [[id number]]  (Abnormal) Collected: 04/26/23 2021    Specimen: Blood Updated: 04/26/23 2032     Glucose 145 mg/dL      Comment: : [id number] Will HurtadoMeter ID: [id number]       POC Glucose Once [[id number]]  (Abnormal) Collected: 04/26/23 1709    Specimen: Blood Updated: 04/26/23 1734     Glucose 221 mg/dL      Comment: : [id number] Garry SYLVESTEReter ID: [id number]       POC Glucose Once [[id number]]  (Normal) Collected: 04/26/23 1123    Specimen: Blood Updated: 04/26/23 1139     Glucose 125 mg/dL      Comment: : [id number] Garry SYLVESTEReter ID: [id number]           Rahul Arnold, APRN

## 2023-04-27 NOTE — PROGRESS NOTES
[facility] PULMONARY AND CRITICAL CARE PROGRESS NOTE - [facility]    Patient: Elijah Escobar    1955    MR# [id number]    Acct# [id number]  04/27/23   17:51 CDT  Referring Provider: Flaco Portillo, *    Chief Complaint: Mechanically ventilated/acute respiratory failure    Interval history:   Patient is being currently maintained on the mechanical ventilation.  Through the tracheostomy.  He has 8.5 Shiley tracheostomy placed.  The assist-control rate is 12 tidal volume 500 FiO2 40% PEEP +7.  The patient has been noted to be tolerating the settings fairly well.  He gives anxious and agitated while we are in the room.  However he does not have purposeful reaction to our presence.  Does not establish eye contact.  CNS wise he does not appear to be having signs of CNS recovery otherwise.      Meds:  aspirin, 81 mg, Oral, Daily  atorvastatin, 20 mg, Oral, Nightly  budesonide, 0.5 mg, Nebulization, BID - RT  Chlorhexidine Gluconate Cloth, 1 application, Topical, Q24H  digoxin, 250 mcg, Oral, Daily  dilTIAZem, 90 mg, Oral, Q8H  furosemide, 40 mg, Intravenous, BID  gabapentin, 300 mg, Oral, Q8H  insulin detemir, 25 Units, Subcutaneous, Q12H  insulin lispro, 0-14 Units, Subcutaneous, Q6H  ipratropium, 0.5 mg, Nebulization, 4x Daily - RT  levETIRAcetam, 500 mg, Oral, BID  levoFLOXacin, 500 mg, Oral, Q24H  methylnaltrexone, 12 mg, Subcutaneous, Every Other Day  metoclopramide, 10 mg, Oral, 4x Daily AC & at Bedtime  metoprolol tartrate, 50 mg, Oral, Q12H  multivitamin with minerals, 1 tablet, Oral, Daily  pantoprazole, 40 mg, Intravenous, Q AM  polyethylene glycol, 17 g, Oral, Daily  sennosides-docusate, 1 tablet, Oral, Daily  sodium chloride, 10 mL, Intravenous, Q12H  sucralfate, 1 g, Oral, 4x Daily AC & at Bedtime      propofol, 5-50 mcg/kg/min, Last Rate: 5 mcg/kg/min (04/23/23 0253)        Ventilator Settings:        Resp Rate (Set): 12 tidal volume 500 FiO2 40%.  FiO2 (%): 40 %  PEEP/CPAP (cm  H2O): 7 cm H20  Minute Ventilation (L/min) (Obs): 13.3 L/min  Resp Rate (Observed) Vent: 26  I:E Ratio (Set): 1:4.45  I:E Ratio (Obs): 1:1.6  PIP Observed (cm H2O): 19 cm H2O  RSBI: 0  Physical Exam:  Temp:  [98.8 °F (37.1 °C)-99.8 °F (37.7 °C)] 99.8 °F (37.7 °C)  Heart Rate:  [] 93  Resp:  [19-30] 29  BP: (100-146)/(60-91) 135/79  FiO2 (%):  [40 %] 40 %    Intake/Output Summary (Last 24 hours) at 4/27/2023 1751  Last data filed at 4/27/2023 1630  Gross per 24 hour   Intake 2842 ml   Output 1750 ml   Net 1092 ml     SpO2 Percentage    04/27/23 1530 04/27/23 1631 04/27/23 1700   SpO2: 94% 97% 95%   Body mass index is 38.28 kg/m².   Physical Exam  Vitals and nursing note reviewed.        Constitutional:       Appearance: He is obese. He is ill-appearing.  Patient is s/p tracheostomy.  8.5 Shiley was placed cuffed nonfenestrated.  HENT:      Head: Normocephalic. Right scalp wound, well-healed, no redness warmth or drainage     Nose: Nose normal.      Mouth/Throat: ETT/OG/TF  Eyes:      General:         Right eye: No discharge.         Left eye: No discharge.   Cardiovascular:      Rate and Rhythm: tachycardia   Pulmonary:      Effort: Tachypnea was present.     Breath sounds: Bilateral entry was present however diminished on the basis sounds.  Few rhonchi noted to be scattered.  Abdominal:      General: Abdomen is flat.      Palpations: Abdomen is soft.    Musculoskeletal:      General: Bilateral wrist restraints, SCDs      Right lower leg: Edema      Left lower leg: Edema   Skin:     General: Skin is warm and dry.      Coloration: Skin is not jaundiced or pale.   Neurological:      General: Intubated      Electronically signed by Espinoza Arreola MD, 4/27/2023, 17:51 CDT      Physician substantive portion: medical decision making  Result Review  Laboratory Data:  Results from last 7 days   Lab Units 04/23/23  2354   WBC 10*3/mm3 10.38   HEMOGLOBIN g/dL 10.0*   PLATELETS 10*3/mm3 245     Results from last 7 days  "  Lab Units 04/27/23  0223 04/26/23  0300 04/25/23  0236 04/24/23  0225 04/23/23  2354   SODIUM mmol/L 141 140 136   < >  --    POTASSIUM mmol/L 3.5 4.0 3.8   < >  --    CO2 mmol/L 33.0* 33.0* 32.0*   < >  --    BUN mg/dL 29* 28* 30*   < >  --    CREATININE mg/dL 1.07 1.07 1.03   < >  --    CRP mg/dL  --   --   --   --  2.75*    < > = values in this interval not displayed.     Results from last 7 days   Lab Units 04/27/23  0407 04/26/23  0343 04/25/23  0357   PH, ARTERIAL pH units 7.521* 7.481* 7.467*   PCO2, ARTERIAL mm Hg 43.8 46.0* 45.7*   PO2 ART mm Hg 96.5 68.2* 74.1*   FIO2 % 40 30 30     Microbiology Results (last 10 days)     Procedure Component Value - Date/Time    Chlamydia trachomatis, Neisseria gonorrhoeae, PCR - Swab, Penis [[id number]]  (Normal) Collected: 04/19/23 0936    Lab Status: Final result Specimen: Swab from Penis Updated: 04/19/23 1425     Chlamydia DNA by PCR Not Detected     Neisseria gonorrhoeae by PCR Not Detected    Narrative:      Disclaimer: The Aptima Combo 2 assay is a target amplification nucleic acid probe test that utilizes target capture for the in vitro qualitative detection and differentiation of ribosomal RNA from Chlamydia trachomatis and Neisseria gonorrhoeae to aid in the diagnosis of chlamydial and/or gonococcal urogenital disease.  Cell culture was once considered to be the \"gold standard\" for detection of CT and NG.  Culture is quite specific, but scientific studies have demonstrated that the NAAT DNA probe technologies have higher clinical sensitivities than culture.    Genital Culture - Swab, Penis [[id number]] Collected: 04/19/23 0936    Lab Status: Final result Specimen: Swab from Penis Updated: 04/22/23 0549     Genital Culture No growth at 3 days     Gram Stain Few (2+) WBCs seen      No organisms seen    Urine Culture - Urine, Indwelling Urethral Catheter [[id number]]  (Normal) Collected: 04/19/23 0307    Lab Status: Final result Specimen: Urine from Indwelling " Urethral Catheter Updated: 04/20/23 1207     Urine Culture No growth    Blood Culture - Blood, Hand, Right [[id number]]  (Normal) Collected: 04/19/23 0209    Lab Status: Final result Specimen: Blood from Hand, Right Updated: 04/24/23 0230     Blood Culture No growth at 5 days    Blood Culture - Blood, Hand, Left [[id number]]  (Normal) Collected: 04/19/23 0207    Lab Status: Final result Specimen: Blood from Hand, Left Updated: 04/24/23 0230     Blood Culture No growth at 5 days         Recent films:  XR Chest 1 View    Result Date: 4/27/2023  HISTORY: Intubated and sedated  CXR: A frontal view the chest obtained  COMPARISON: 04/26/2023  FINDINGS: Tracheostomy tube remains appropriate in position. Right IJ central line tip projects near The atrial caval junction. Hypoventilated lungs. Prominent pulmonary vascular structures with left basilar consolidation. Questionable small left pleural effusion. No pneumothorax. Stable mediastinal contours.      Impression: 1. Stable 1 day view the chest. This report was finalized on 04/27/2023 07:30 by Dr. Coleen Terry MD.    XR Chest 1 View    Result Date: 4/26/2023  HISTORY: Intubated  CXR: A frontal view the chest obtained  COMPARISON: 04/25/2023  FINDINGS: New tracheostomy tube appears appropriate in position. A right IJ central line tip projects near the atrial caval junction. Enteric tube is been removed.  Diminished level of inspiration. Stable prominence of the cardiac silhouette with questionable improving pulmonary edema. Left lower lobe consolidation. Trace pleural effusions. No pneumothorax.      Impression: 1. New tracheostomy tube appropriate in position. Similar positioning of the right IJ central line. 2. Mild cardiomegaly with mild improving pulmonary edema considered with persistent left basilar consolidation. Probable trace pleural effusions. This report was finalized on 04/26/2023 07:39 by Dr. Coleen Terry MD.     Personal review of imaging: CXR shows  Reviewed current imaging study.  It showed increased interstitial opacities and vascular congestion likely from pulm edema.  No lung consolidation.  Tubes and lines are in position.      Pulmonary Assessment:  1. Acute on chronic hypoxic respiratory failure on mechanical ventilation/s/p tracheostomy PEG tube.  2. Chronic congestive diastolic heart failure  3. Atrial fibrillation  4. COPD with history of tobacco abuse  5. Meningioma s/p craniotomy, cranioplasty and flap infection.    6. Encephalopathy/patient not showing signs of CNS responsiveness.  7. Hypertension  8. Diabetes mellitus insulin-dependent  9. Coronary artery disease  10. History obstructive sleep apnea syndrome.  11. Seizure disorder  12. Chronic pain  13.     Recommend/plan:   · Patient been seen and evaluated today.  Overall there is stability has been observed on the current condition of this patient.  · We have attempted to perform pressure support spontaneous breathing trial attempt however this patient went into apnea events.  He has central apnea.  Therefore he was placed back on the assist-control with a backup rate as above.    · This is believed to be due to the atelectasis.  Chest x-ray to be followed in AM..  · We will maintain the patient on assist-control mode.    · Currently the treatment and management of this patient will be coordinated with the other attendings the case.  ID is already on follow-up on this patient.  · We will continue to observe and monitor his clinical response.  Since this patient is unable to follow simple commands.  I do not recommend to start spontaneous breathing trials yet.      Patient has been seen by evaluated by Dr. Arreola.  Full plan of management and impression was formulated by myself.  Critical care time provided to the patient is estimated to be 50 minutes.    Electronically signed by          The patient has developed significant shortness of breath.  I have been called by the nursing staff.  And we  have ordered a stat chest x-ray on this patient who has been showing increased bilateral lung infiltrates.  Consistent with either worsening pneumonia versus acute pulmonary edema.  My plan is to go ahead and give him a 40 mg of Lasix stat.  We will obtain CRP BNP.  In addition to adjustment of the antimicrobial coverage vancomycin in addition to Zosyn to be added to the Levaquin.  I will consult the infectious disease in the morning in order to follow-up on the antimicrobial coverage.  On mechanical ventilation we have increased the PEEP to 10.  And FiO2 has been increased to 100%.  His oxygen saturation is improved from the lower 80s to 95% and we will follow-up on the current adjustments in the morning.  Continues to be on a full code.       Espinoza Arreola MD,  Pulmonologist/Intensivist   4/27/2023, 17:51 CDT

## 2023-04-27 NOTE — SIGNIFICANT NOTE
04/27/23 0634   Readings   PEEP Intrinsic (cm H2O) 6.7 cm H2O   Plateau Pressure (cm H2O) 15 cm H2O   Driving Pressure (cm H2O) 8.3 cm H2O   Static Compliance (L/cm H2O) 45   Dynamic Compliance (L/cm H2O) 200 L/cm H2O

## 2023-04-27 NOTE — SIGNIFICANT NOTE
Spontaneous Breathing Trial   $ SBT Readiness to Wean yes   Vt Spontaneous (mL) 0 mL   Effort (VC) unable   RSBI 0   Effort (NIF) unable   Resp Rate (Obs) 0      Pt placed on spontaneous ps 10..the patient was apneic after 10 seconds. Failed trial #2

## 2023-04-27 NOTE — PLAN OF CARE
Problem: Inability to Wean (Mechanical Ventilation, Invasive)  Goal: Mechanical Ventilation Liberation  4/27/2023 1034 by Darline Grady, RRT  Outcome: Ongoing, Not Progressing  4/27/2023 1033 by Darline Grady, RRT  Outcome: Ongoing, Progressing   Goal Outcome Evaluation:  Third attempt for SBT pt failed due do becoming apneic.

## 2023-04-27 NOTE — CASE MANAGEMENT/SOCIAL WORK
Continued Stay Note   [place]     Patient Name: Elijah Escobar  MRN: [id number]  Today's Date: 4/27/2023    Admit Date: 3/31/2023    Plan: LTAC?   Discharge Plan     Row Name 04/27/23 0907       Plan    Plan LTAC?    Plan Comments Patient's insurance would require precert and requires documentation of 3 failed weaning attempts for LTAC placement.               Discharge Codes    No documentation.                     TIANA Syed

## 2023-04-27 NOTE — THERAPY TREATMENT NOTE
Acute Care - Physical Therapy Treatment Note  [facility]     Patient Name: Elijah Escobar  : 1955  MRN: [id number]  Today's Date: 2023      Visit Dx:     ICD-10-CM ICD-9-CM   1. Intracranial epidural hematoma  S06.4X0A 852.40   2. Impaired mobility  Z74.09 799.89   3. S/P craniotomy  Z98.890 V45.89   4. Infection of deep incisional surgical site after procedure, initial encounter  T81.42XA 998.59   5. Meningioma s/p craniotomy  D32.9 225.2   6. Type 2 diabetes mellitus with hyperglycemia and peripheral neuropathy, with long-term current use of insulin (HCC)  E11.65 250.00    Z79.4 790.29     V58.67   7. Paroxysmal atrial fibrillation with rapid ventricular response  I48.0 427.31   8. Swelling of scalp  R22.0 784.2   9. Acute pulmonary edema due to A-fib RVR  J81.0 518.4   10. Acute respiratory failure with hypoxia  J96.01 518.81   11. Type 2 myocardial infarction due to arrhythmia  I49.9 427.9    I21.A1 410.90   12. Obesity (BMI 30-39.9)  E66.9 278.00   13. Tobacco abuse, in remission  F17.201 305.1   14. Acute systolic heart failure  I50.21 428.21   15. ADDIE (obstructive sleep apnea)  G47.33 327.23   16. Secondary hypertension  I15.9 405.99   17. Gastroesophageal reflux disease, unspecified whether esophagitis present  K21.9 530.81   18. Class 2 severe obesity due to excess calories with serious comorbidity and body mass index (BMI) of 38.0 to 38.9 in adult  E66.01 278.01    Z68.38 V85.38   19. Leukocytosis, unspecified type  D72.829 288.60   20. Anemia due to other cause, not classified  D64.89 285.8   21. Smoker  F17.200 305.1   22. History of cranioplasty  Z98.890 V45.89   23. Facial edema  R60.0 782.3   24. Coronary artery disease due to lipid rich plaque  I25.10 414.00    I25.83 414.3   25. Postoperative infection, unspecified type, initial encounter  T81.40XA 998.59   26. Chronic obstructive pulmonary disease, unspecified COPD type  J44.9 496   27. Respiratory insufficiency  R06.89 786.09      Patient Active Problem List   Diagnosis   • Meningioma s/p craniotomy   • Hypertension   • GERD (gastroesophageal reflux disease)   • Coronary artery disease   • Class 2 severe obesity due to excess calories with serious comorbidity and body mass index (BMI) of 38.0 to 38.9 in adult   • Type 2 diabetes mellitus with hyperglycemia and peripheral neuropathy, with long-term current use of insulin (HCC)   • Leukocytosis   • Other specified anemias   • Paroxysmal atrial fibrillation with rapid ventricular response   • Post-operative infection   • Smoker   • History of cranioplasty   • Facial edema   • Swelling of scalp   • Acute pulmonary edema due to A-fib RVR   • Acute respiratory failure with hypoxia   • Type 2 myocardial infarction due to arrhythmia   • COPD (chronic obstructive pulmonary disease)   • Obesity (BMI 30-39.9)   • Tobacco abuse, in remission   • Acute systolic heart failure   • ADDIE (obstructive sleep apnea)   • Intracranial epidural hematoma   • Elevated liver function tests   • Central apnea     Past Medical History:   Diagnosis Date   • Brain tumor    • Coronary artery disease    • COVID-19 vaccine series completed     MODERNA X 3; LAST DOSE 3/2022   • Diabetes    • Erectile dysfunction    • GERD (gastroesophageal reflux disease)    • Hypercholesteremia    • Hypertension    • Seizure      Past Surgical History:   Procedure Laterality Date   • BALLOON ANGIOPLASTY, ARTERY Right    • COLONOSCOPY     • CRANIECTOMY Right 7/1/2022    Procedure: CRANIECTOMY WITH INCISIONAL WASHOUT;  Surgeon: Felipe Banerjee MD;  Location:  [facility];  Service: Neurosurgery;  Laterality: Right;   • CRANIOPLASTY Right 10/4/2022    Procedure: CRANIOPLASTY right with Lumbar drain;  Surgeon: Flaco Portillo MD;  Location:  [facility];  Service: Neurosurgery;  Laterality: Right;   • CRANIOPLASTY N/A 4/4/2023    Procedure: CRANIOPLASTY, removal, right, horseshoe;  Surgeon: Flaco Portillo MD;  Location:  [facility];   Service: Neurosurgery;  Laterality: N/A;   • CRANIOTOMY Right 4/12/2023    Procedure: CRANIOTOMY; evacuation of right hematoma;  Surgeon: Flaco Portillo MD;  Location:  [facility];  Service: Neurosurgery;  Laterality: Right;   • CRANIOTOMY FOR TUMOR Right 6/16/2022    Procedure: CRANIOTOMY FOR TUMOR STERIOTACTIC WITH BRAIN LAB, right;  Surgeon: Flaco Portillo MD;  Location:  [facility];  Service: Neurosurgery;  Laterality: Right;   • ENDOSCOPY WITH GASTROSTOMY TUBE INSERTION N/A 4/25/2023    Procedure: ESOPHAGOGASTRODUODENOSCOPY WITH GASTROSTOMY TUBE INSERTION;  Surgeon: Geeta Abdalla MD;  Location:  [facility];  Service: General;  Laterality: N/A;   • TRACHEOSTOMY N/A 4/25/2023    Procedure: TRACHEOSTOMY;  Surgeon: Wagner Villarreal MD;  Location:  [facility];  Service: ENT;  Laterality: N/A;     PT Assessment (last 12 hours)     PT Evaluation and Treatment     Row Name 04/27/23 0902          Physical Therapy Time and Intention    Subjective Information no complaints  non responsive  -     Document Type therapy note (daily note)  -     Mode of Treatment physical therapy  -     Row Name 04/27/23 0902          General Information    Existing Precautions/Restrictions fall  -JUAN     Row Name 04/27/23 0902          Motor Skills    Comments, Therapeutic Exercise PROm x15 all 4 extremities.  -JUAN     Row Name             Wound 04/04/23 1111 Right temporal region Incision    Wound - Properties Group Placement Date: 04/04/23  -CHRISTIANO Placement Time: 1111  -CHRISTIANO Side: Right  -CHRISTIANO Location: temporal region  -CHRISTIANO Primary Wound Type: Incision  -CHRISTIANO    Retired Wound - Properties Group Placement Date: 04/04/23  -CHRISTIANO Placement Time: 1111  -CHRISTIANO Side: Right  -CHRISTIANO Location: temporal region  -CHRISTIANO Primary Wound Type: Incision  -CHRISTIANO    Retired Wound - Properties Group Date first assessed: 04/04/23  -CHRISTIANO Time first assessed: 1111  -CHRISTIANO Side: Right  -CHRISTIANO Location: temporal region  -CHRISTIANO Primary Wound Type: Incision  -CHRISTIANO    Row Name              Wound 04/12/23 1622 Right scalp Incision    Wound - Properties Group Placement Date: 04/12/23  -SAFIA Placement Time: 1622  -SAFIA Side: Right  -SAFIA Location: scalp  -SAFIA Primary Wound Type: Incision  -SAFIA    Retired Wound - Properties Group Placement Date: 04/12/23  -SAFIA Placement Time: 1622  -SAFIA Side: Right  -SAFIA Location: scalp  -SAFIA Primary Wound Type: Incision  -SAFIA    Retired Wound - Properties Group Date first assessed: 04/12/23  -SAFIA Time first assessed: 1622  -SAFIA Side: Right  -SAFIA Location: scalp  -SAFIA Primary Wound Type: Incision  -SAFIA    Row Name             Wound 04/25/23 1202 Left upper abdomen Puncture    Wound - Properties Group Placement Date: 04/25/23  -DT Placement Time: 1202  -DT Present on Hospital Admission: N  -DT Side: Left  -DT Orientation: upper  -DT Location: abdomen  -DT Primary Wound Type: Puncture  -DT    Retired Wound - Properties Group Placement Date: 04/25/23  -DT Placement Time: 1202  -DT Present on Hospital Admission: N  -DT Side: Left  -DT Orientation: upper  -DT Location: abdomen  -DT Primary Wound Type: Puncture  -DT    Retired Wound - Properties Group Date first assessed: 04/25/23  -DT Time first assessed: 1202  -DT Present on Hospital Admission: N  -DT Side: Left  -DT Location: abdomen  -DT Primary Wound Type: Puncture  -DT    Row Name             Wound 04/25/23 1240 anterior neck Incision    Wound - Properties Group Placement Date: 04/25/23  -DT Placement Time: 1240  -DT Present on Hospital Admission: N  -DT Orientation: anterior  -DT Location: neck  -DT Primary Wound Type: Incision  -DT    Retired Wound - Properties Group Placement Date: 04/25/23  -DT Placement Time: 1240  -DT Present on Hospital Admission: N  -DT Orientation: anterior  -DT Location: neck  -DT Primary Wound Type: Incision  -DT    Retired Wound - Properties Group Date first assessed: 04/25/23  -DT Time first assessed: 1240  -DT Present on Hospital Admission: N  -DT Location: neck  -DT Primary Wound Type: Incision   -DT    Row Name 04/27/23 0902          Positioning and Restraints    Pre-Treatment Position in bed  -     Post Treatment Position bed  -JUAN     In Bed notified nsg;fowlers;call light within reach  -           User Key  (r) = Recorded By, (t) = Taken By, (c) = Cosigned By    Initials Name Provider Type    JUAN Carrie Callaway, KYLAH Physical Therapist Assistant    Kierra Conroy, RN Registered Nurse    Ricky George, RN Registered Nurse    Eron Garduno RN Registered Nurse                Physical Therapy Education     Title: PT OT SLP Therapies (In Progress)     Topic: Physical Therapy (In Progress)     Point: Mobility training (In Progress)     Learning Progress Summary           Patient Acceptance, E,D, NL by JUAN at 4/27/2023 1034    Comment: Benefits of ROM.    Acceptance, E,D, NL by JUAN at 4/26/2023 0950    Comment: Benefits of ROM. Patient is sedated    Acceptance, E,TB, VU by CR at 4/25/2023 0209    Acceptance, E,TB, VU by CR at 4/24/2023 0041    Acceptance, E, NR by JUAN at 4/11/2023 0809    Comment: Looking ahead during gait    Acceptance, E,D, DU,VU by JUAN at 4/10/2023 1447    Comment: Safety with transfers, please take time, no impulsiveness    Acceptance, E,TB, VU by CM at 4/6/2023 1350    Acceptance, E, VU by SB at 4/5/2023 1605    Comment: helmet fit, safety, POC    Acceptance, E, VU by CHRISTIANO at 4/3/2023 1353    Comment: gait, safety,    Acceptance, E, VU by SB at 4/1/2023 1405    Comment: pt edu on POC, benefits of act, PLB and d/c plans   Family Acceptance, E,TB, VU by CR at 4/25/2023 0209    Acceptance, E,TB, VU by CR at 4/24/2023 0041    Acceptance, E,TB, VU by CM at 4/6/2023 1350                   Point: Home exercise program (Done)     Learning Progress Summary           Patient Acceptance, E,TB, VU by CR at 4/25/2023 0209    Acceptance, E,TB, VU by CR at 4/24/2023 0041    Acceptance, E, NR by JESSICA at 4/19/2023 0815    Comment: progression of PT    Acceptance, E,TB, VU by CM at 4/6/2023  1350   Family Acceptance, E,TB, VU by CR at 4/25/2023 0209    Acceptance, E,TB, VU by CR at 4/24/2023 0041    Acceptance, E,TB, VU by CM at 4/6/2023 1350                   Point: Body mechanics (Done)     Learning Progress Summary           Patient Acceptance, E,TB, VU by CR at 4/25/2023 0209    Acceptance, E,TB, VU by CR at 4/24/2023 0041    Acceptance, E,TB, VU by CM at 4/6/2023 1350   Family Acceptance, E,TB, VU by CR at 4/25/2023 0209    Acceptance, E,TB, VU by CR at 4/24/2023 0041    Acceptance, E,TB, VU by CM at 4/6/2023 1350                   Point: Precautions (Done)     Learning Progress Summary           Patient Acceptance, E,TB, VU by CR at 4/25/2023 0209    Acceptance, E,TB, VU by CR at 4/24/2023 0041    Acceptance, E,TB, VU by CM at 4/6/2023 1350    Acceptance, E, VU by SB at 4/5/2023 1605    Comment: helmet fit, safety, POC    Acceptance, E, VU by SB at 4/1/2023 1405    Comment: pt edu on POC, benefits of act, PLB and d/c plans   Family Acceptance, E,TB, VU by CR at 4/25/2023 0209    Acceptance, E,TB, VU by CR at 4/24/2023 0041    Acceptance, E,TB, VU by CM at 4/6/2023 1350                               User Key     Initials Effective Dates Name Provider Type Discipline    JESSICA 02/03/23 -  Bruce Pillai, PT DPT Physical Therapist PT    JUAN 02/03/23 -  Carrie Callaway PTA Physical Therapist Assistant PT    CHRISTIANO 02/03/23 -  Александр Saravia, [place]LAH Physical Therapist Assistant PT    CM 03/02/23 -  Dianelys Carlton, RN Registered Nurse Nurse    SB 06/16/21 -  Erika Javier, PT DPT Physical Therapist PT    CR 03/03/23 -  Janice Singh, RN Registered Nurse Nurse              PT Recommendation and Plan         Outcome Measures     Row Name 04/27/23 1000 04/26/23 0900 04/25/23 0820       How much help from another person do you currently need...    Turning from your back to your side while in flat bed without using bedrails? 1  -JUAN 1  -JUAN 1  -TB    Moving from lying on back to sitting on the side of a  flat bed without bedrails? 1  -JUAN 1  -JUAN 1  -TB    Moving to and from a bed to a chair (including a wheelchair)? 1  -JUAN 1  -JUAN 1  -TB    Standing up from a chair using your arms (e.g., wheelchair, bedside chair)? 1  -JUAN 1  -JUAN 1  -TB    Climbing 3-5 steps with a railing? 1  -JUAN 1  -JUAN 1  -TB    To walk in hospital room? 1  -JUAN 1  -JUAN 1  -TB    AM-PAC 6 Clicks Score (PT) 6  -JUAN 6  -JUAN 6  -TB       Functional Assessment    Outcome Measure Options -- -- AM-PAC 6 Clicks Basic Mobility (PT)  -TB          User Key  (r) = Recorded By, (t) = Taken By, (c) = Cosigned By    Initials Name Provider Type    Carrie Clark PTA Physical Therapist Assistant    Tere Berger PTA Physical Therapist Assistant                 Time Calculation:    PT Charges     Row Name 04/27/23 1035             Time Calculation    Start Time 0902  -JUAN      Stop Time 0927  -JUAN      Time Calculation (min) 25 min  -JUAN         Timed Charges    65403 - PT Therapeutic Exercise Minutes 25  -JUAN         Total Minutes    Timed Charges Total Minutes 25  -JUAN       Total Minutes 25  -JUAN            User Key  (r) = Recorded By, (t) = Taken By, (c) = Cosigned By    Initials Name Provider Type    Carrie Clark PTA Physical Therapist Assistant              Therapy Charges for Today     Code Description Service Date Service Provider Modifiers Qty    87040789231 HC PT THER PROC EA 15 MIN 4/26/2023 Carrie Callaway PTA GP 2    95939119441 HC PT THER PROC EA 15 MIN 4/27/2023 Carrie Callaway PTA GP 2          PT G-Codes  Outcome Measure Options: AM-PAC 6 Clicks Basic Mobility (PT)  AM-PAC 6 Clicks Score (PT): 6  AM-PAC 6 Clicks Score (OT): 20    Carrie Callaway PTA  4/27/2023

## 2023-04-27 NOTE — PROGRESS NOTES
[facility] Medicine Services  INPATIENT PROGRESS NOTE    Patient Name: Elijah Escobar  Date of Admission: 3/31/2023  Today's Date: 04/27/23  Length of Stay: 27  Primary Care Physician: Brianna Martinez APRN    Subjective   Chief Complaint: Consultation for assistance with medical management  HPI     The patient has had no complications with recent PEG and trach placement.  There is no family present in the room today.  Patient does not respond to verbal or tactile stimulus and is unable to follow simple commands.  He is not shaking his head side-to-side today and does not recognize my presence at the bedside.  He has failed 2 previous weaning attempts and another weaning trial will be pursued today.  If the patient fails a third weaning trial then LTAC has agreed to accept the patient in transfer.  I discussed the case with Dr. Arreola who feels that the patient's apnea is central in origin with a minimal chance of recovery long-term.  CMP this a.m. shows albumin 2.8, total protein 5.9 and glucose 190.  ABGs show pH 7.521 with PO2 and PCO2 in appropriate range.    Review of Systems   All pertinent negatives and positives are as above. All other systems have been reviewed and are negative unless otherwise stated.     Objective    Temp:  [98.3 °F (36.8 °C)-99.1 °F (37.3 °C)] 98.8 °F (37.1 °C)  Heart Rate:  [] 71  Resp:  [18-27] 23  BP: (100-146)/(60-91) 124/68  FiO2 (%):  [40 %] 40 %  Physical Exam  Constitutional:       General: He is unresponsive to verbal stimulus.  Minimally responsive to tactile stimulus and pain.     Appearance: He is ill-appearing.      Interventions: He is unresponsive except to painful stimulus.  Trach in place with ventilator support.  Tube feedings initiated through PEG tube.     Comments: Unable to follow commands.  Does not respond to verbal stimulus.   HENT:      Head: Normocephalic.      Comments: Postop incision almost completely  healed.     Right Ear: External ear normal.      Left Ear: External ear normal.      Mouth: Mucous membranes are dry.      Pharynx: Oropharynx is clear.  Trach in place with slightly bloody bandages noted.  Eyes:      General: No scleral icterus.     Conjunctiva/sclera: Conjunctivae normal.   Cardiovascular:      Rate and Rhythm: Normal rate. Rhythm irregularly irregular.      Pulses: Normal pulses.      Heart sounds: Normal heart sounds. No murmur heard.  Pulmonary:      Effort: He is on ventilator support.     Breath sounds: Decreased breath sounds present.   Abdominal:      General: Bowel sounds are normal.      Palpations: Abdomen is soft. There is no mass.  PEG tube in place with tube feedings ongoing.  Musculoskeletal:      Right lower leg: No edema.      Left lower leg: No edema.   Skin:     General: Skin is warm and dry.   Neurological:     Results Review:  I have reviewed the labs, radiology results, and diagnostic studies.    Laboratory Data:   Results from last 7 days   Lab Units 04/23/23  2354   WBC 10*3/mm3 10.38   HEMOGLOBIN g/dL 10.0*   HEMATOCRIT % 33.1*   PLATELETS 10*3/mm3 245        Results from last 7 days   Lab Units 04/27/23  0223 04/26/23  0300 04/25/23  0236   SODIUM mmol/L 141 140 136   POTASSIUM mmol/L 3.5 4.0 3.8   CHLORIDE mmol/L 100 99 96*   CO2 mmol/L 33.0* 33.0* 32.0*   BUN mg/dL 29* 28* 30*   CREATININE mg/dL 1.07 1.07 1.03   CALCIUM mg/dL 8.1* 8.5* 8.2*   BILIRUBIN mg/dL 0.5 0.6 0.3   ALK PHOS U/L 119* 100 115   ALT (SGPT) U/L 12 17 20   AST (SGOT) U/L 15 17 18   GLUCOSE mg/dL 190* 97 199*       Culture Data:   No results found for: BLOODCX, URINECX, WOUNDCX, MRSACX, RESPCX, STOOLCX    Radiology Data:   Imaging Results (Last 24 Hours)     Procedure Component Value Units Date/Time    XR Chest 1 View [[id number]] Collected: 04/27/23 0729     Updated: 04/27/23 0733    Narrative:      HISTORY: Intubated and sedated     CXR: A frontal view the chest obtained     COMPARISON: 04/26/2023      FINDINGS: Tracheostomy tube remains appropriate in position. Right IJ  central line tip projects near The atrial caval junction. Hypoventilated  lungs. Prominent pulmonary vascular structures with left basilar  consolidation. Questionable small left pleural effusion. No  pneumothorax. Stable mediastinal contours.       Impression:      1. Stable 1 day view the chest.  This report was finalized on 04/27/2023 07:30 by Dr. Coleen Terry MD.          I have reviewed the patient's current medications.     Assessment/Plan   Assessment  Active Hospital Problems    Diagnosis    • **Swelling of scalp    • Central apnea    • Elevated liver function tests    • COPD (chronic obstructive pulmonary disease)    • Obesity (BMI 30-39.9)    • Tobacco abuse, in remission    • Acute systolic heart failure    • ADDIE (obstructive sleep apnea)    • Type 2 myocardial infarction due to arrhythmia    • Acute pulmonary edema due to A-fib RVR    • Acute respiratory failure with hypoxia    • Intracranial epidural hematoma    • Post-operative infection    • Paroxysmal atrial fibrillation with rapid ventricular response    • Type 2 diabetes mellitus with hyperglycemia and peripheral neuropathy, with long-term current use of insulin (HCC)    • Coronary artery disease    • Meningioma s/p craniotomy        Treatment Plan  Continue long-acting insulin  Ventilator management per pulmonology  Tube feedings reinitiated per general surgery today  Potential LTAC placement if patient fails weaning trials once again    Medical Decision Making  Number and Complexity of problems:   1) exacerbation of respiratory failure with hypoxia secondary to pulmonary edema, acute, high complexity  2) PAF with RVR, acute, moderate complexity  3) meningioma versus intracranial infection, acute, high complexity  4) type 2 diabetes, chronic, moderate complexity  5) elevated liver enzymes acute moderate complexity  6) elevated WBC acute moderate complexity     Differential  Diagnosis: Pulmonary embolus, LV dysfunction     Conditions and Status        Condition is unchanged.     MDM Data  External documents reviewed: No new data  Cardiac tracing (EKG, telemetry) interpretation: A-fib rate controlled  Radiology interpretation: Chest x-ray has increased interstitial markings consistent with fluid overload.  Labs reviewed:  reviewed  Any tests that were considered but not ordered: None     Decision rules/scores evaluated (example [id number], Wells, etc): [id number] score of 5 with a 7.2% risk of stroke annually     Discussed with:  nursing      Care Planning  Shared decision making: Patient is sedated  Code status and discussions: full     Disposition  Social Determinants of Health that impact treatment or disposition: none  I expect the patient to be discharged by the primary service.    Electronically signed by Ramin Singh DO, 04/27/23, 09:43 CDT.

## 2023-04-27 NOTE — PLAN OF CARE
Goal Outcome Evaluation:  Plan of Care Reviewed With: patient        Progress: no change  Outcome Evaluation: Tolerating tube feeding, BG improved with formula change. SBT today, failed d/t apnea. LTACH percert in the works. Turn Q2

## 2023-04-27 NOTE — PROGRESS NOTES
[facility]  ENT PROGRESS NOTES  2023      Patient Identification:  Name: Elijah Escobar  Age: 67 y.o.  Sex: male  :  1955  MRN: [id number]                     Date of Admission: 3/31/2023      CC:    Postop day #2 status post tracheostomy  Subjective   Patient remains unresponsive and continues mechanically ventilated    HISTORY   HPI, ROS, PMFSHx reviewed:   No changes       Objective     PE:    Temp:  [98.8 °F (37.1 °C)-99.1 °F (37.3 °C)] 98.9 °F (37.2 °C)  Heart Rate:  [] 130  Resp:  [18-27] 24  BP: (100-146)/(60-91) 131/91  FiO2 (%):  [40 %] 40 %   Body mass index is 38.28 kg/m².     General appearance: acyanotic, in no respiratory distress.   Ability to Communicate: Patient is mechanically ventilated and unresponsive and therefore unable to communicate   Facial exam: no craniofacial deformities   Ears - right ear normal, left ear normal.   Nasal exam - normal and patent, no erythema, discharge or polyps.   Oropharyngeal exam - mucous membranes moist, pharynx normal without lesions.   Neck exam -#8 cuffed Shiley tracheostomy tube in place.  No bleeding noted trach site looks good with sutures intact.                 CVS exam: normal rate and regular rhythm.              Chest: Normal chest excursion appreciated with ventilation.              No lymphadenopathy in the anterior or posterior neck, supraclavicular areas.              Neurological exam reveals neck supple without rigidity.       DATA      MEDICATIONS   I have reviewed the current MAR.     LABS AND IMAGING      I have reviewed the labs and imaging data since the patient was last seen.       Assessment     ASSESSMENT       Swelling of scalp    Meningioma s/p craniotomy    Coronary artery disease    Type 2 diabetes mellitus with hyperglycemia and peripheral neuropathy, with long-term current use of insulin (HCC)    Paroxysmal atrial fibrillation with rapid ventricular response    Post-operative infection    Acute  pulmonary edema due to A-fib RVR    Acute respiratory failure with hypoxia    Type 2 myocardial infarction due to arrhythmia    COPD (chronic obstructive pulmonary disease)    Obesity (BMI 30-39.9)    Tobacco abuse, in remission    Acute systolic heart failure    ADDIE (obstructive sleep apnea)    Intracranial epidural hematoma    Elevated liver function tests    Central apnea    Status post tracheostomy      Plan     PLAN     Continue local wound care  Sutures out in 3 to 4 days  We will continue to follow          Wagner Villarreal MD  4/27/2023  13:32 CDT

## 2023-04-27 NOTE — SIGNIFICANT NOTE
04/27/23 0922   Readiness to Wean Daily Screen   Daily Screen Complete Y   Daily Screen Outcome fail   Spontaneous Breathing Trial   $ SBT Readiness to Wean yes   Vt Spontaneous (mL) 0 mL   Effort (VC) unable   RSBI 0   Effort (NIF) unable   Resp Rate (Obs) 0     Pt on PS 10 40 % peep 7 x 10 minutes and went apneic. Placed pt back on previous settings  VC 12 500 + 7 40%

## 2023-04-27 NOTE — PROGRESS NOTES
RT EQUIPMENT DEVICE RELATED - SKIN ASSESSMENT    RT Medical Equipment/Device:     Tracheostomy    Skin Assessment:      Neck:  Incision and Intact    Device Skin Pressure Protection:  Skin-to-device areas padded:  Optifoam    Nurse Notification:  Kylie Condon, CRT

## 2023-04-27 NOTE — PLAN OF CARE
Goal Outcome Evaluation:    Problem: Communication Impairment (Mechanical Ventilation, Invasive)  Goal: Effective Communication  Outcome: Ongoing, Not Progressing     Problem: Device-Related Complication Risk (Mechanical Ventilation, Invasive)  Goal: Optimal Device Function  Intervention: Optimize Device Care and Function  Recent Flowsheet Documentation  Taken 4/26/2023 1823 by Isis Motta CRT  Airway Safety Measures:   manual resuscitator/mask at bedside   oxygen flowmeter at bedside   suction at bedside     Problem: Inability to Wean (Mechanical Ventilation, Invasive)  Goal: Mechanical Ventilation Liberation  Outcome: Ongoing, Not Progressing     Problem: Skin and Tissue Injury (Mechanical Ventilation, Invasive)  Goal: Absence of Device-Related Skin and Tissue Injury  Outcome: Ongoing, Progressing

## 2023-04-27 NOTE — PROGRESS NOTES
RT EQUIPMENT DEVICE RELATED - SKIN ASSESSMENT    RT Medical Equipment/Device:     Tracheostomy    Skin Assessment:      Neck:  Incision and Intact    Device Skin Pressure Protection:  Skin-to-device areas padded:  Optifoam    Nurse Notification:  Kylie Grady, RRT

## 2023-04-27 NOTE — PROGRESS NOTES
Geeta Abdalla MD - General Surgery  Progress Note     LOS: 27 days   Patient Care Team:  Brianna Martinez APRN as PCP - General (Nurse Practitioner)  Rahul Arnold APRN as Nurse Practitioner (Nurse Practitioner)  Flaco Portillo MD as Surgeon (Neurosurgery)      Subjective     Interval History:     POD 2 s/p PEG placement yesterday. Tolerating meds and goal tube feeds.     Objective     Vital Signs  Temp:  [98.8 °F (37.1 °C)-99.8 °F (37.7 °C)] 99.8 °F (37.7 °C)  Heart Rate:  [] 93  Resp:  [19-30] 29  BP: (100-146)/(60-91) 135/79  FiO2 (%):  [40 %] 40 %    Physical Exam:  General appearance - chronically ill   Mental status - unable to assess   Eyes - sclera anicteric  Neck - trach in place   Chest - on vent via trach   Heart - regular rate   Abdomen - soft, nontender, nondistended, no masses or organomegaly. PEG in place       Results Review:    Lab Results (last 24 hours)     Procedure Component Value Units Date/Time    POC Glucose Once [[id number]]  (Abnormal) Collected: 04/27/23 1201    Specimen: Blood Updated: 04/27/23 1222     Glucose 193 mg/dL      Comment: : [id number] [place] Paolamauro Skinner ID: [id number]       Blood Gas, Arterial - [[id number]]  (Abnormal) Collected: 04/27/23 0407    Specimen: Arterial Blood Updated: 04/27/23 0407     Site Left Radial     Marek's Test Positive     pH, Arterial 7.521 pH units      Comment: 83 Value above reference range        pCO2, Arterial 43.8 mm Hg      pO2, Arterial 96.5 mm Hg      HCO3, Arterial 35.8 mmol/L      Comment: 83 Value above reference range        Base Excess, Arterial 11.8 mmol/L      Comment: 83 Value above reference range        O2 Saturation, Arterial 98.8 %      Temperature 37.0 C      Barometric Pressure for Blood Gas 749 mmHg      Modality Ventilator     FIO2 40 %      Ventilator Mode AC     Set Tidal Volume 500     Set Mech Resp Rate 12.0     PEEP 7.0     Collected by [id number]     Comment: Meter: [id number]     :   [id number]        pCO2, Temperature Corrected 43.8 mm Hg      pH, Temp Corrected 7.521 pH Units      pO2, Temperature Corrected 96.5 mm Hg     Comprehensive Metabolic Panel [[id number]]  (Abnormal) Collected: 04/27/23 0223    Specimen: Blood Updated: 04/27/23 0339     Glucose 190 mg/dL      BUN 29 mg/dL      Creatinine 1.07 mg/dL      Sodium 141 mmol/L      Potassium 3.5 mmol/L      Chloride 100 mmol/L      CO2 33.0 mmol/L      Calcium 8.1 mg/dL      Total Protein 5.9 g/dL      Albumin 2.8 g/dL      ALT (SGPT) 12 U/L      AST (SGOT) 15 U/L      Alkaline Phosphatase 119 U/L      Total Bilirubin 0.5 mg/dL      Globulin 3.1 gm/dL      A/G Ratio 0.9 g/dL      BUN/Creatinine Ratio 27.1     Anion Gap 8.0 mmol/L      eGFR 76.1 mL/min/1.73     Narrative:      GFR Normal >60  Chronic Kidney Disease <60  Kidney Failure <15      POC Glucose Once [[id number]]  (Abnormal) Collected: 04/27/23 0017    Specimen: Blood Updated: 04/27/23 0028     Glucose 197 mg/dL      Comment: : [email or web address] Glenroy ThomasMeter ID: [id number]       POC Glucose Once [[id number]]  (Abnormal) Collected: 04/26/23 2021    Specimen: Blood Updated: 04/26/23 2032     Glucose 145 mg/dL      Comment: : [id number] Nallen WhitneyMeter ID: [id number]           Imaging Results (Last 24 Hours)     Procedure Component Value Units Date/Time    XR Chest 1 View [[id number]] Collected: 04/27/23 0729     Updated: 04/27/23 0733    Narrative:      HISTORY: Intubated and sedated     CXR: A frontal view the chest obtained     COMPARISON: 04/26/2023     FINDINGS: Tracheostomy tube remains appropriate in position. Right IJ  central line tip projects near The atrial caval junction. Hypoventilated  lungs. Prominent pulmonary vascular structures with left basilar  consolidation. Questionable small left pleural effusion. No  pneumothorax. Stable mediastinal contours.       Impression:      1. Stable 1 day view the chest.  This report was finalized on 04/27/2023 07:30 by   Coleen Terry MD.            Assessment & Plan     Oropharyngeal dysphagia   Obesity BMI 39     POD 2 s/p PEG placement. Tolerating meds and goal tube feeds via PEG. Keep binder in place. I will sign off - please do not hesitate to call with any questions or concerns.     Geeta Abdalla MD  04/27/23  17:46 CDT

## 2023-04-27 NOTE — PLAN OF CARE
Goal Outcome Evaluation:  Plan of Care Reviewed With: patient, other (see comments)        Progress: improving  Outcome Evaluation: Ntn follow up. Pt is s/p trach and PEG tube placement on 4/25. He cont to require vent support. He is not receiving propofol sedation. TF has been resumed. He is receiving Diabetisource AC at 65mL/hour with 25mL water flush every hour. He is tolerating current TF. BS has improved. TF is meeting low end of kcal range and 95% of est'd protein needs. Will cont to follow and make adjustments as necessary.

## 2023-04-28 NOTE — SIGNIFICANT NOTE
04/28/23 0600   Readings   PEEP Intrinsic (cm H2O) 8.9 cm H2O   Plateau Pressure (cm H2O) 28 cm H2O   Driving Pressure (cm H2O) 18.3 cm H2O   Static Compliance (L/cm H2O) 36   Dynamic Compliance (L/cm H2O) 98 L/cm H2O

## 2023-04-28 NOTE — PLAN OF CARE
Goal Outcome Evaluation:   Pt does not follow commands but withdraw from pain in lower extremities with sedation paused. When nurse arrived on shift pt had low TV of 100-250's, and making the vent go off. Pt was very agitated and moving his head vigorously. Pt was breathing hard. I had called ENT and Dr. Villarreal came to bedside and seen patient. He said that the patient looked more agitated and there wasn't anything he could do since it was a vent problem. He suggested that I call pulmonary and that some sedation might help. I had started sedation due to an order already in for Propofol.Propofol is currently infusing at 5. I then called pulmonary and paged .  had put in orders and changed vent settings to FiO2 at 100, Peep of 10, and rate is still at 12. Pt has not voided spontaneous, therefore pt was straight cath him X2 getting a total of urine output of 700 ml's for the shift. Remained afebrile and patient safety ,maintained this shift.

## 2023-04-28 NOTE — PROGRESS NOTES
OPERATIVE NOTE:  Elijah Escobar    DATE OF PROCEDURE: 4/28/2023    PROCEDURE:   Flexible Fiberoptic Bronchoscopy    ANESTHESIA:  None    REASON FOR PROCEDURE:  Procedure was recommended for persistent air leak around inflated cough with concern expressed for respiratory therapy and nursing staff with trach to become dislodged.  Patient was getting relatively small tidal volumes between 102 100 cc peak airway pressures were not abnormal.  It was noted during the procedure of the placement of the tracheostomy that he had a relatively large subglottic KM and secondary to overinflation of his cuff and endotracheal tube that needed to be replaced the morning of the placement of the tracheostomy.  In retrospect this was probably secondary to an attempt to occlude a relatively large tracheostomy with a smaller balloon.  No #10 tracheostomy tubes are available and no #10 endotracheal tubes were available either.  Flexible fiberoptic bronchoscopy was performed through the tracheostomy.    DETAILS of OPERATION:  The patient was supine in bed.    FINDINGS:  Mucosal surfaces:   The mucosal surfaces demonstrated normal mucosa surfaces with mild inflammation.  The trachea and primary bronchi were normal as well as the robert.  There was mildly thickened secretions but they were relatively scant and clear.  No obstruction was noted and the tracheostomy appeared to be in excellent position.    Options to include the upper airway with a laryngeal mask or some other type of device in the oropharynx was discussed as well as the option of: Pulmonary and neurosurgery to consider increasing the tidal volume of the ventilator to compensate for the leak as well as possibility of sedation to reduce resistance.  Patient did not have trouble or difficulty with the leak in the first 24 hours after placement.  Tracheostomy did not appear positional.    The patient tolerated procedure well.

## 2023-04-28 NOTE — PROGRESS NOTES
"Pharmacy Dosing Service  Pharmacokinetics  Vancomycin Follow-up Evaluation    Assessment/Action/Plan:  Current Order: Vancomycin 1000 mg IVPB every 12 hours  Current end date:5/4/2023  Levels: Vancomycin trough ordered before dose due on 4/29/2023 at 1100  Additional antimicrobial agent(s): Zosyn  SCr = 1.25 (up from 1.07)  Vancomycin dose adjusted to 750 mg iv q12h. Pharmacy will continue to follow daily and adjust dose accordingly.     Subjective:  Elijah Escobar is a 67 y.o. male currently on Vancomycin for the treatment of pneumonia, day 1 of 7 of treatment.    AUC Model Data:  Regimen: 750 mg IV every 12 hours.  Exposure target: AUC24 (range)400-600 mg/L.hr   AUC24,ss: 524 mg/L.hr  PAUC*: 71 %  Ctrough,ss: 16.9 mg/L  Pconc*: 39 %  Tox.: 13 %      Objective:  Ht: 177.8 cm (70\"); Wt: 120 kg (265 lb 1.6 oz)  Estimated Creatinine Clearance: 74.5 mL/min (by C-G formula based on SCr of 1.25 mg/dL).   Creatinine   Date Value Ref Range Status   04/28/2023 1.25 0.76 - 1.27 mg/dL Final   04/27/2023 1.07 0.76 - 1.27 mg/dL Final   04/26/2023 1.07 0.76 - 1.27 mg/dL Final   08/31/2022 0.80 0.60 - 1.30 mg/dL Final     Comment:     Serial Number: [id number]:  [id number]   10/21/2020 1.03 0.60 - 1.30 mg/dL Final   02/07/2020 1.00 0.60 - 1.30 mg/dL Final   02/07/2020 1.00 0.60 - 1.30 mg/dL Final      Lab Results   Component Value Date    WBC 10.38 04/23/2023    WBC 13.61 (H) 04/19/2023    WBC 16.70 (H) 04/18/2023         Lab Results   Component Value Date    VANCOTROUGH 22.20 (H) 04/22/2023       Culture Results:  Microbiology Results (last 10 days)       Procedure Component Value - Date/Time    Chlamydia trachomatis, Neisseria gonorrhoeae, PCR - Swab, Penis [[id number]]  (Normal) Collected: 04/19/23 0936    Lab Status: Final result Specimen: Swab from Penis Updated: 04/19/23 1425     Chlamydia DNA by PCR Not Detected     Neisseria gonorrhoeae by PCR Not Detected    Narrative:      Disclaimer: The Aptima Combo 2 assay is a " "target amplification nucleic acid probe test that utilizes target capture for the in vitro qualitative detection and differentiation of ribosomal RNA from Chlamydia trachomatis and Neisseria gonorrhoeae to aid in the diagnosis of chlamydial and/or gonococcal urogenital disease.  Cell culture was once considered to be the \"gold standard\" for detection of CT and NG.  Culture is quite specific, but scientific studies have demonstrated that the NAAT DNA probe technologies have higher clinical sensitivities than culture.    Genital Culture - Swab, Penis [[id number]] Collected: 04/19/23 0936    Lab Status: Final result Specimen: Swab from Penis Updated: 04/22/23 0549     Genital Culture No growth at 3 days     Gram Stain Few (2+) WBCs seen      No organisms seen    Urine Culture - Urine, Indwelling Urethral Catheter [[id number]]  (Normal) Collected: 04/19/23 0307    Lab Status: Final result Specimen: Urine from Indwelling Urethral Catheter Updated: 04/20/23 1207     Urine Culture No growth    Blood Culture - Blood, Hand, Right [[id number]]  (Normal) Collected: 04/19/23 0209    Lab Status: Final result Specimen: Blood from Hand, Right Updated: 04/24/23 0230     Blood Culture No growth at 5 days    Blood Culture - Blood, Hand, Left [[id number]]  (Normal) Collected: 04/19/23 0207    Lab Status: Final result Specimen: Blood from Hand, Left Updated: 04/24/23 0230     Blood Culture No growth at 5 days            Mk Stahl, PharmD   04/28/23 11:36 CDT    "

## 2023-04-28 NOTE — PLAN OF CARE
Problem: Inability to Wean (Mechanical Ventilation, Invasive)  Goal: Mechanical Ventilation Liberation  4/28/2023 0827 by Darline Grady, RRT  Outcome: Ongoing, Not Progressing  4/28/2023 0827 by Darline Grady, RRT  Outcome: Ongoing, Progressing   Goal Outcome Evaluation:   Oxygen had to be increased to 100% and peep to 10 last night. Criteria not met for weaning trial.

## 2023-04-28 NOTE — PAYOR COMM NOTE
"4/27 clinical  [id number]    Virgil Escobar (67 y.o. Male)     Date of Birth   1955    Social Security Number       Address   [address] [place] [id number]    Home Phone   [phone]    MRN   [id number]       Worship   Non-Muslim    Marital Status                               Admission Date   3/31/23    Admission Type   Urgent    Admitting Provider   Flaco Protillo MD    Attending Provider   Flaco Portillo MD    Department, Room/Bed   [facility] CARDIAC CARE, C001/1       Discharge Date       Discharge Disposition       Discharge Destination                               Attending Provider: Flaco Portillo MD    Allergies: No Known Allergies    Isolation: None   Infection: None   Code Status: CPR    Ht: 177.8 cm (70\")   Wt: 120 kg (265 lb 1.6 oz)    Admission Cmt: None   Principal Problem: Swelling of scalp [R22.0]                 Active Insurance as of 3/31/2023     Primary Coverage     Payor Plan Insurance Group Employer/Plan Group    ANTHEM MEDICARE REPLACEMENT ANTHEM MEDICARE ADVANTAGE [id number]     Payor Plan Address Payor Plan Phone Number Payor Plan Fax Number Effective Dates    [address] [phone]  1/1/2022 - None Entered    [facility] [address]       Subscriber Name Subscriber Birth Date Member ID       VIRGIL ESCOBAR 1955 [id number]                 Emergency Contacts      (Rel.) Home Phone Work Phone Mobile Phone    Cathy Guzmán (Son) -- -- [phone]    Manju Lua (Relative) -- -- [phone]    ESCOBARMAYTE CARO (Spouse) [phone] -- --    david lua (Sister) [phone] -- --    josie benavides (Sister) -- -- [phone]    Jeevan Regan [phone] -- --            Ventilator/Non-Invasive Ventilation Settings (From admission, onward)     Start     Ordered    04/27/23 2335  Ventilator - AC/VC; 100%; 10  Continuous        Question Answer Comment   Vent Mode AC/VC    FiO2 100%    PEEP 10     "    04/27/23 2335    04/24/23 1040  Ventilator - AC/VC; 12; 30%; SpO2 >/= 90%; 5  Continuous,   Status:  Canceled        Comments: Ti 1.10   Question Answer Comment   Vent Mode AC/VC    Rate 12    FiO2 30%    Titrate FiO2 to Keep SpO2 >/= 90%    PEEP 5        04/24/23 1039    04/22/23 1023  Ventilator - AC/PC; 10; 40%; SpO2 >/= 90%; 5; 12  Continuous,   Status:  Canceled        Comments: Ti 1.10   Question Answer Comment   Vent Mode AC/PC    Rate 10    FiO2 40%    Titrate FiO2 to Keep SpO2 >/= 90%    PEEP 5    Inspiratory Pressure 12        04/22/23 1023    04/21/23 0816  Ventilator - AC/PC; Other; 14; 70%; SpO2 >/= 90%; 8; 20  Continuous,   Status:  Canceled        Comments: Ti 1.10   Question Answer Comment   Vent Mode AC/PC    Rate Other    Rate 14    FiO2 70%    Titrate FiO2 to Keep SpO2 >/= 90%    PEEP 8    Inspiratory Pressure 20        04/21/23 0821    04/20/23 0836  Ventilator - AC/VC; Other; 14; 40%; SpO2 >/= 90%; 5; 500  Continuous,   Status:  Canceled        Comments: Square wave, flow 50, pause 0.2   Question Answer Comment   Vent Mode AC/VC    Rate Other    Rate 14    FiO2 40%    Titrate FiO2 to Keep SpO2 >/= 90%    PEEP 5    Flow Rate 500        04/20/23 0836    04/19/23 0823  Ventilator - AC/VC; Other; 14; 30%; SpO2 >/= 90%; 7.5; 500  Continuous,   Status:  Canceled        Question Answer Comment   Vent Mode AC/VC    Rate Other    Rate 14    FiO2 30%    Titrate FiO2 to Keep SpO2 >/= 90%    PEEP 7.5    Flow Rate 500        04/19/23 0823    04/18/23 0836  Ventilator - AC/VC; Other; 16; 60%; SpO2 >/= 90%; 7.5; 500  Continuous,   Status:  Canceled        Question Answer Comment   Vent Mode AC/VC    Rate Other    Rate 16    FiO2 60%    Titrate FiO2 to Keep SpO2 >/= 90%    PEEP 7.5    Flow Rate 500        04/18/23 0836    04/17/23 0809  Ventilator - AC/VC; Other; 18; 30%; SpO2 >/= 90%; 5; 500  Continuous,   Status:  Canceled        Question Answer Comment   Vent Mode AC/VC    Rate Other    Rate 18    FiO2  30%    Titrate FiO2 to Keep SpO2 >/= 90%    PEEP 5    Flow Rate 500        04/17/23 0809    04/16/23 1934  Ventilator - AC/VC  Continuous,   Status:  Canceled        Question:  Vent Mode  Answer:  AC/VC    04/16/23 1934 04/16/23 1745  NIPPV (CPAP or BIPAP)  Until Discontinued,   Status:  Canceled        Question Answer Comment   Indication: Acute Respiratory Failure    Type: BIPAP        04/16/23 1744    04/02/23 1501  NIPPV (CPAP or BIPAP)  Until Discontinued,   Status:  Canceled        Question Answer Comment   Indication: Acute Respiratory Failure    Type: BIPAP    IPAP 16    EPAP 8    Breath Rate 20    Titrate Oxygen for SpO2 90% - 95%        04/02/23 1500    04/01/23 2143  NIPPV (CPAP or BIPAP)  Until Discontinued,   Status:  Canceled        Comments: RT to manage   Question Answer Comment   Indication: Acute Respiratory Failure    Type: BIPAP    Titrate Oxygen for SpO2 90% - 95%        04/01/23 2142                   Physician Progress Notes (last 24 hours)      Espinoza Arreola MD at 04/27/23 1751              [facility] PULMONARY AND CRITICAL CARE PROGRESS NOTE - [facility]    Patient: Elijah Escobar    1955    MR# [id number]    Acct# [id number]  04/27/23   17:51 CDT  Referring Provider: Flaco Portillo, *    Chief Complaint: Mechanically ventilated/acute respiratory failure    Interval history:   Patient is being currently maintained on the mechanical ventilation.  Through the tracheostomy.  He has 8.5 Shiley tracheostomy placed.  The assist-control rate is 12 tidal volume 500 FiO2 40% PEEP +7.  The patient has been noted to be tolerating the settings fairly well.  He gives anxious and agitated while we are in the room.  However he does not have purposeful reaction to our presence.  Does not establish eye contact.  CNS wise he does not appear to be having signs of CNS recovery otherwise.      Meds:  aspirin, 81 mg, Oral, Daily  atorvastatin, 20 mg, Oral, Nightly  budesonide, 0.5 mg,  Nebulization, BID - RT  Chlorhexidine Gluconate Cloth, 1 application, Topical, Q24H  digoxin, 250 mcg, Oral, Daily  dilTIAZem, 90 mg, Oral, Q8H  furosemide, 40 mg, Intravenous, BID  gabapentin, 300 mg, Oral, Q8H  insulin detemir, 25 Units, Subcutaneous, Q12H  insulin lispro, 0-14 Units, Subcutaneous, Q6H  ipratropium, 0.5 mg, Nebulization, 4x Daily - RT  levETIRAcetam, 500 mg, Oral, BID  levoFLOXacin, 500 mg, Oral, Q24H  methylnaltrexone, 12 mg, Subcutaneous, Every Other Day  metoclopramide, 10 mg, Oral, 4x Daily AC & at Bedtime  metoprolol tartrate, 50 mg, Oral, Q12H  multivitamin with minerals, 1 tablet, Oral, Daily  pantoprazole, 40 mg, Intravenous, Q AM  polyethylene glycol, 17 g, Oral, Daily  sennosides-docusate, 1 tablet, Oral, Daily  sodium chloride, 10 mL, Intravenous, Q12H  sucralfate, 1 g, Oral, 4x Daily AC & at Bedtime      propofol, 5-50 mcg/kg/min, Last Rate: 5 mcg/kg/min (04/23/23 0253)        Ventilator Settings:        Resp Rate (Set): 12 tidal volume 500 FiO2 40%.  FiO2 (%): 40 %  PEEP/CPAP (cm H2O): 7 cm H20  Minute Ventilation (L/min) (Obs): 13.3 L/min  Resp Rate (Observed) Vent: 26  I:E Ratio (Set): 1:4.45  I:E Ratio (Obs): 1:1.6  PIP Observed (cm H2O): 19 cm H2O  RSBI: 0  Physical Exam:  Temp:  [98.8 °F (37.1 °C)-99.8 °F (37.7 °C)] 99.8 °F (37.7 °C)  Heart Rate:  [] 93  Resp:  [19-30] 29  BP: (100-146)/(60-91) 135/79  FiO2 (%):  [40 %] 40 %    Intake/Output Summary (Last 24 hours) at 4/27/2023 1751  Last data filed at 4/27/2023 1630  Gross per 24 hour   Intake 2842 ml   Output 1750 ml   Net 1092 ml     SpO2 Percentage    04/27/23 1530 04/27/23 1631 04/27/23 1700   SpO2: 94% 97% 95%   Body mass index is 38.28 kg/m².   Physical Exam  Vitals and nursing note reviewed.        Constitutional:       Appearance: He is obese. He is ill-appearing.  Patient is s/p tracheostomy.  8.5 Shiley was placed cuffed nonfenestrated.  HENT:      Head: Normocephalic. Right scalp wound, well-healed, no redness  warmth or drainage     Nose: Nose normal.      Mouth/Throat: ETT/OG/TF  Eyes:      General:         Right eye: No discharge.         Left eye: No discharge.   Cardiovascular:      Rate and Rhythm: tachycardia   Pulmonary:      Effort: Tachypnea was present.     Breath sounds: Bilateral entry was present however diminished on the basis sounds.  Few rhonchi noted to be scattered.  Abdominal:      General: Abdomen is flat.      Palpations: Abdomen is soft.    Musculoskeletal:      General: Bilateral wrist restraints, SCDs      Right lower leg: Edema      Left lower leg: Edema   Skin:     General: Skin is warm and dry.      Coloration: Skin is not jaundiced or pale.   Neurological:      General: Intubated      Electronically signed by Espinoza Arreola MD, 4/27/2023, 17:51 CDT      Physician substantive portion: medical decision making  Result Review  Laboratory Data:  Results from last 7 days   Lab Units 04/23/23  2354   WBC 10*3/mm3 10.38   HEMOGLOBIN g/dL 10.0*   PLATELETS 10*3/mm3 245     Results from last 7 days   Lab Units 04/27/23  0223 04/26/23  0300 04/25/23  0236 04/24/23  0225 04/23/23  2354   SODIUM mmol/L 141 140 136   < >  --    POTASSIUM mmol/L 3.5 4.0 3.8   < >  --    CO2 mmol/L 33.0* 33.0* 32.0*   < >  --    BUN mg/dL 29* 28* 30*   < >  --    CREATININE mg/dL 1.07 1.07 1.03   < >  --    CRP mg/dL  --   --   --   --  2.75*    < > = values in this interval not displayed.     Results from last 7 days   Lab Units 04/27/23  0407 04/26/23  0343 04/25/23  0357   PH, ARTERIAL pH units 7.521* 7.481* 7.467*   PCO2, ARTERIAL mm Hg 43.8 46.0* 45.7*   PO2 ART mm Hg 96.5 68.2* 74.1*   FIO2 % 40 30 30     Microbiology Results (last 10 days)     Procedure Component Value - Date/Time    Chlamydia trachomatis, Neisseria gonorrhoeae, PCR - Swab, Penis [[id number]]  (Normal) Collected: 04/19/23 0936    Lab Status: Final result Specimen: Swab from Penis Updated: 04/19/23 1425     Chlamydia DNA by PCR Not Detected     Neisseria  "gonorrhoeae by PCR Not Detected    Narrative:      Disclaimer: The Aptima Combo 2 assay is a target amplification nucleic acid probe test that utilizes target capture for the in vitro qualitative detection and differentiation of ribosomal RNA from Chlamydia trachomatis and Neisseria gonorrhoeae to aid in the diagnosis of chlamydial and/or gonococcal urogenital disease.  Cell culture was once considered to be the \"gold standard\" for detection of CT and NG.  Culture is quite specific, but scientific studies have demonstrated that the NAAT DNA probe technologies have higher clinical sensitivities than culture.    Genital Culture - Swab, Penis [[id number]] Collected: 04/19/23 0936    Lab Status: Final result Specimen: Swab from Penis Updated: 04/22/23 0549     Genital Culture No growth at 3 days     Gram Stain Few (2+) WBCs seen      No organisms seen    Urine Culture - Urine, Indwelling Urethral Catheter [[id number]]  (Normal) Collected: 04/19/23 0307    Lab Status: Final result Specimen: Urine from Indwelling Urethral Catheter Updated: 04/20/23 1207     Urine Culture No growth    Blood Culture - Blood, Hand, Right [[id number]]  (Normal) Collected: 04/19/23 0209    Lab Status: Final result Specimen: Blood from Hand, Right Updated: 04/24/23 0230     Blood Culture No growth at 5 days    Blood Culture - Blood, Hand, Left [[id number]]  (Normal) Collected: 04/19/23 0207    Lab Status: Final result Specimen: Blood from Hand, Left Updated: 04/24/23 0230     Blood Culture No growth at 5 days         Recent films:  XR Chest 1 View    Result Date: 4/27/2023  HISTORY: Intubated and sedated  CXR: A frontal view the chest obtained  COMPARISON: 04/26/2023  FINDINGS: Tracheostomy tube remains appropriate in position. Right IJ central line tip projects near The atrial caval junction. Hypoventilated lungs. Prominent pulmonary vascular structures with left basilar consolidation. Questionable small left pleural effusion. No pneumothorax. " Stable mediastinal contours.      Impression: 1. Stable 1 day view the chest. This report was finalized on 04/27/2023 07:30 by Dr. Coleen Terry MD.    XR Chest 1 View    Result Date: 4/26/2023  HISTORY: Intubated  CXR: A frontal view the chest obtained  COMPARISON: 04/25/2023  FINDINGS: New tracheostomy tube appears appropriate in position. A right IJ central line tip projects near the atrial caval junction. Enteric tube is been removed.  Diminished level of inspiration. Stable prominence of the cardiac silhouette with questionable improving pulmonary edema. Left lower lobe consolidation. Trace pleural effusions. No pneumothorax.      Impression: 1. New tracheostomy tube appropriate in position. Similar positioning of the right IJ central line. 2. Mild cardiomegaly with mild improving pulmonary edema considered with persistent left basilar consolidation. Probable trace pleural effusions. This report was finalized on 04/26/2023 07:39 by Dr. Coleen Terry MD.     Personal review of imaging: CXR shows Reviewed current imaging study.  It showed increased interstitial opacities and vascular congestion likely from pulm edema.  No lung consolidation.  Tubes and lines are in position.      Pulmonary Assessment:  1. Acute on chronic hypoxic respiratory failure on mechanical ventilation/s/p tracheostomy PEG tube.  2. Chronic congestive diastolic heart failure  3. Atrial fibrillation  4. COPD with history of tobacco abuse  5. Meningioma s/p craniotomy, cranioplasty and flap infection.    6. Encephalopathy/patient not showing signs of CNS responsiveness.  7. Hypertension  8. Diabetes mellitus insulin-dependent  9. Coronary artery disease  10. History obstructive sleep apnea syndrome.  11. Seizure disorder  12. Chronic pain  13.     Recommend/plan:   · Patient been seen and evaluated today.  Overall there is stability has been observed on the current condition of this patient.  · We have attempted to perform pressure  support spontaneous breathing trial attempt however this patient went into apnea events.  He has central apnea.  Therefore he was placed back on the assist-control with a backup rate as above.    · This is believed to be due to the atelectasis.  Chest x-ray to be followed in AM..  · We will maintain the patient on assist-control mode.    · Currently the treatment and management of this patient will be coordinated with the other attendings the case.  ID is already on follow-up on this patient.  · We will continue to observe and monitor his clinical response.  Since this patient is unable to follow simple commands.  I do not recommend to start spontaneous breathing trials yet.      Patient has been seen by evaluated by Dr. Arreola.  Full plan of management and impression was formulated by myself.  Critical care time provided to the patient is estimated to be 50 minutes.    Electronically signed by          The patient has developed significant shortness of breath.  I have been called by the nursing staff.  And we have ordered a stat chest x-ray on this patient who has been showing increased bilateral lung infiltrates.  Consistent with either worsening pneumonia versus acute pulmonary edema.  My plan is to go ahead and give him a 40 mg of Lasix stat.  We will obtain CRP BNP.  In addition to adjustment of the antimicrobial coverage vancomycin in addition to Zosyn to be added to the Levaquin.  I will consult the infectious disease in the morning in order to follow-up on the antimicrobial coverage.  On mechanical ventilation we have increased the PEEP to 10.  And FiO2 has been increased to 100%.  His oxygen saturation is improved from the lower 80s to 95% and we will follow-up on the current adjustments in the morning.  Continues to be on a full code.       Espinoza Arreola MD,  Pulmonologist/Intensivist   4/27/2023, 17:51 CDT      Electronically signed by Espinoza Arreola MD at 04/27/23 8636     Geeta Abdalla MD at  04/27/23 1746          Geeta Abdalla MD - General Surgery  Progress Note     LOS: 27 days   Patient Care Team:  Brianna Martinez APRN as PCP - General (Nurse Practitioner)  Rahul Arnold APRN as Nurse Practitioner (Nurse Practitioner)  Flaco Portillo MD as Surgeon (Neurosurgery)      Subjective     Interval History:     POD 2 s/p PEG placement yesterday. Tolerating meds and goal tube feeds.     Objective     Vital Signs  Temp:  [98.8 °F (37.1 °C)-99.8 °F (37.7 °C)] 99.8 °F (37.7 °C)  Heart Rate:  [] 93  Resp:  [19-30] 29  BP: (100-146)/(60-91) 135/79  FiO2 (%):  [40 %] 40 %    Physical Exam:  General appearance - chronically ill   Mental status - unable to assess   Eyes - sclera anicteric  Neck - trach in place   Chest - on vent via trach   Heart - regular rate   Abdomen - soft, nontender, nondistended, no masses or organomegaly. PEG in place       Results Review:    Lab Results (last 24 hours)     Procedure Component Value Units Date/Time    POC Glucose Once [[id number]]  (Abnormal) Collected: 04/27/23 1201    Specimen: Blood Updated: 04/27/23 1222     Glucose 193 mg/dL      Comment: : [id number] [place] SYLVESTERtad ID: [id number]       Blood Gas, Arterial - [[id number]]  (Abnormal) Collected: 04/27/23 0407    Specimen: Arterial Blood Updated: 04/27/23 0407     Site Left Radial     Marek's Test Positive     pH, Arterial 7.521 pH units      Comment: 83 Value above reference range        pCO2, Arterial 43.8 mm Hg      pO2, Arterial 96.5 mm Hg      HCO3, Arterial 35.8 mmol/L      Comment: 83 Value above reference range        Base Excess, Arterial 11.8 mmol/L      Comment: 83 Value above reference range        O2 Saturation, Arterial 98.8 %      Temperature 37.0 C      Barometric Pressure for Blood Gas 749 mmHg      Modality Ventilator     FIO2 40 %      Ventilator Mode AC     Set Tidal Volume 500     Set Mech Resp Rate 12.0     PEEP 7.0     Collected by [id number]     Comment: Meter:  [id number]     :  [email or web address]        pCO2, Temperature Corrected 43.8 mm Hg      pH, Temp Corrected 7.521 pH Units      pO2, Temperature Corrected 96.5 mm Hg     Comprehensive Metabolic Panel [[id number]]  (Abnormal) Collected: 04/27/23 0223    Specimen: Blood Updated: 04/27/23 0339     Glucose 190 mg/dL      BUN 29 mg/dL      Creatinine 1.07 mg/dL      Sodium 141 mmol/L      Potassium 3.5 mmol/L      Chloride 100 mmol/L      CO2 33.0 mmol/L      Calcium 8.1 mg/dL      Total Protein 5.9 g/dL      Albumin 2.8 g/dL      ALT (SGPT) 12 U/L      AST (SGOT) 15 U/L      Alkaline Phosphatase 119 U/L      Total Bilirubin 0.5 mg/dL      Globulin 3.1 gm/dL      A/G Ratio 0.9 g/dL      BUN/Creatinine Ratio 27.1     Anion Gap 8.0 mmol/L      eGFR 76.1 mL/min/1.73     Narrative:      GFR Normal >60  Chronic Kidney Disease <60  Kidney Failure <15      POC Glucose Once [[id number]]  (Abnormal) Collected: 04/27/23 0017    Specimen: Blood Updated: 04/27/23 0028     Glucose 197 mg/dL      Comment: : [email or web address]'David ThomasMeter ID: [id number]       POC Glucose Once [[id number]]  (Abnormal) Collected: 04/26/23 2021    Specimen: Blood Updated: 04/26/23 2032     Glucose 145 mg/dL      Comment: : [id number] Orlando WhitneyMeter ID: [id number]           Imaging Results (Last 24 Hours)     Procedure Component Value Units Date/Time    XR Chest 1 View [[id number]] Collected: 04/27/23 0729     Updated: 04/27/23 0733    Narrative:      HISTORY: Intubated and sedated     CXR: A frontal view the chest obtained     COMPARISON: 04/26/2023     FINDINGS: Tracheostomy tube remains appropriate in position. Right IJ  central line tip projects near The atrial caval junction. Hypoventilated  lungs. Prominent pulmonary vascular structures with left basilar  consolidation. Questionable small left pleural effusion. No  pneumothorax. Stable mediastinal contours.       Impression:      1. Stable 1 day view the chest.  This report was  finalized on 2023 07:30 by Dr. Coleen Terry MD.            Assessment & Plan     Oropharyngeal dysphagia   Obesity BMI 39     POD 2 s/p PEG placement. Tolerating meds and goal tube feeds via PEG. Keep binder in place. I will sign off - please do not hesitate to call with any questions or concerns.     Geeta Abdalla MD  23  17:46 CDT          Electronically signed by Geeta Abdalla MD at 23 1747     Wagner Villarreal MD at 23 1332          [facility]  ENT PROGRESS NOTES  2023      Patient Identification:  Name: Elijah Escobar  Age: 67 y.o.  Sex: male  :  1955  MRN: [id number]                     Date of Admission: 3/31/2023      CC:    Postop day #2 status post tracheostomy  Subjective   Patient remains unresponsive and continues mechanically ventilated    HISTORY   HPI, ROS, PMFSHx reviewed:   No changes      Objective     PE:    Temp:  [98.8 °F (37.1 °C)-99.1 °F (37.3 °C)] 98.9 °F (37.2 °C)  Heart Rate:  [] 130  Resp:  [18-27] 24  BP: (100-146)/(60-91) 131/91  FiO2 (%):  [40 %] 40 %   Body mass index is 38.28 kg/m².     General appearance: acyanotic, in no respiratory distress.   Ability to Communicate: Patient is mechanically ventilated and unresponsive and therefore unable to communicate   Facial exam: no craniofacial deformities   Ears - right ear normal, left ear normal.   Nasal exam - normal and patent, no erythema, discharge or polyps.   Oropharyngeal exam - mucous membranes moist, pharynx normal without lesions.   Neck exam -#8 cuffed Shiley tracheostomy tube in place.  No bleeding noted trach site looks good with sutures intact.                 CVS exam: normal rate and regular rhythm.              Chest: Normal chest excursion appreciated with ventilation.              No lymphadenopathy in the anterior or posterior neck, supraclavicular areas.              Neurological exam reveals neck supple without rigidity.       DATA       MEDICATIONS   I have reviewed the current MAR.     LABS AND IMAGING      I have reviewed the labs and imaging data since the patient was last seen.      Assessment     ASSESSMENT       Swelling of scalp    Meningioma s/p craniotomy    Coronary artery disease    Type 2 diabetes mellitus with hyperglycemia and peripheral neuropathy, with long-term current use of insulin (HCC)    Paroxysmal atrial fibrillation with rapid ventricular response    Post-operative infection    Acute pulmonary edema due to A-fib RVR    Acute respiratory failure with hypoxia    Type 2 myocardial infarction due to arrhythmia    COPD (chronic obstructive pulmonary disease)    Obesity (BMI 30-39.9)    Tobacco abuse, in remission    Acute systolic heart failure    ADDIE (obstructive sleep apnea)    Intracranial epidural hematoma    Elevated liver function tests    Central apnea    Status post tracheostomy     Plan     PLAN     Continue local wound care  Sutures out in 3 to 4 days  We will continue to follow         Wagner Villarreal MD  4/27/2023  13:32 CDT        Electronically signed by Wagner Villarreal MD at 04/27/23 1334     Ramin Singh DO at 04/27/23 0937              [facility] Medicine Services  INPATIENT PROGRESS NOTE    Patient Name: Elijah Escobar  Date of Admission: 3/31/2023  Today's Date: 04/27/23  Length of Stay: 27  Primary Care Physician: Brianna Martinez APRN    Subjective   Chief Complaint: Consultation for assistance with medical management  HPI     The patient has had no complications with recent PEG and trach placement.  There is no family present in the room today.  Patient does not respond to verbal or tactile stimulus and is unable to follow simple commands.  He is not shaking his head side-to-side today and does not recognize my presence at the bedside.  He has failed 2 previous weaning attempts and another weaning trial will be pursued today.  If the patient fails a  third weaning trial then [facility] has agreed to accept the patient in transfer.  I discussed the case with Dr. Arreola who feels that the patient's apnea is central in origin with a minimal chance of recovery long-term.  CMP this a.m. shows albumin 2.8, total protein 5.9 and glucose 190.  ABGs show pH 7.521 with PO2 and PCO2 in appropriate range.    Review of Systems   All pertinent negatives and positives are as above. All other systems have been reviewed and are negative unless otherwise stated.     Objective    Temp:  [98.3 °F (36.8 °C)-99.1 °F (37.3 °C)] 98.8 °F (37.1 °C)  Heart Rate:  [] 71  Resp:  [18-27] 23  BP: (100-146)/(60-91) 124/68  FiO2 (%):  [40 %] 40 %  Physical Exam  Constitutional:       General: He is unresponsive to verbal stimulus.  Minimally responsive to tactile stimulus and pain.     Appearance: He is ill-appearing.      Interventions: He is unresponsive except to painful stimulus.  Trach in place with ventilator support.  Tube feedings initiated through PEG tube.     Comments: Unable to follow commands.  Does not respond to verbal stimulus.   HENT:      Head: Normocephalic.      Comments: Postop incision almost completely healed.     Right Ear: External ear normal.      Left Ear: External ear normal.      Mouth: Mucous membranes are dry.      Pharynx: Oropharynx is clear.  Trach in place with slightly bloody bandages noted.  Eyes:      General: No scleral icterus.     Conjunctiva/sclera: Conjunctivae normal.   Cardiovascular:      Rate and Rhythm: Normal rate. Rhythm irregularly irregular.      Pulses: Normal pulses.      Heart sounds: Normal heart sounds. No murmur heard.  Pulmonary:      Effort: He is on ventilator support.     Breath sounds: Decreased breath sounds present.   Abdominal:      General: Bowel sounds are normal.      Palpations: Abdomen is soft. There is no mass.  PEG tube in place with tube feedings ongoing.  Musculoskeletal:      Right lower leg: No edema.      Left lower  leg: No edema.   Skin:     General: Skin is warm and dry.   Neurological:     Results Review:  I have reviewed the labs, radiology results, and diagnostic studies.    Laboratory Data:   Results from last 7 days   Lab Units 04/23/23  2354   WBC 10*3/mm3 10.38   HEMOGLOBIN g/dL 10.0*   HEMATOCRIT % 33.1*   PLATELETS 10*3/mm3 245        Results from last 7 days   Lab Units 04/27/23  0223 04/26/23  0300 04/25/23  0236   SODIUM mmol/L 141 140 136   POTASSIUM mmol/L 3.5 4.0 3.8   CHLORIDE mmol/L 100 99 96*   CO2 mmol/L 33.0* 33.0* 32.0*   BUN mg/dL 29* 28* 30*   CREATININE mg/dL 1.07 1.07 1.03   CALCIUM mg/dL 8.1* 8.5* 8.2*   BILIRUBIN mg/dL 0.5 0.6 0.3   ALK PHOS U/L 119* 100 115   ALT (SGPT) U/L 12 17 20   AST (SGOT) U/L 15 17 18   GLUCOSE mg/dL 190* 97 199*       Culture Data:   No results found for: BLOODCX, URINECX, WOUNDCX, MRSACX, RESPCX, STOOLCX    Radiology Data:   Imaging Results (Last 24 Hours)     Procedure Component Value Units Date/Time    XR Chest 1 View [[id number]] Collected: 04/27/23 0729     Updated: 04/27/23 0733    Narrative:      HISTORY: Intubated and sedated     CXR: A frontal view the chest obtained     COMPARISON: 04/26/2023     FINDINGS: Tracheostomy tube remains appropriate in position. Right IJ  central line tip projects near The atrial caval junction. Hypoventilated  lungs. Prominent pulmonary vascular structures with left basilar  consolidation. Questionable small left pleural effusion. No  pneumothorax. Stable mediastinal contours.       Impression:      1. Stable 1 day view the chest.  This report was finalized on 04/27/2023 07:30 by Dr. Coleen Terry MD.          I have reviewed the patient's current medications.     Assessment/Plan   Assessment  Active Hospital Problems    Diagnosis    • **Swelling of scalp    • Central apnea    • Elevated liver function tests    • COPD (chronic obstructive pulmonary disease)    • Obesity (BMI 30-39.9)    • Tobacco abuse, in remission    • Acute  systolic heart failure    • ADDIE (obstructive sleep apnea)    • Type 2 myocardial infarction due to arrhythmia    • Acute pulmonary edema due to A-fib RVR    • Acute respiratory failure with hypoxia    • Intracranial epidural hematoma    • Post-operative infection    • Paroxysmal atrial fibrillation with rapid ventricular response    • Type 2 diabetes mellitus with hyperglycemia and peripheral neuropathy, with long-term current use of insulin (HCC)    • Coronary artery disease    • Meningioma s/p craniotomy        Treatment Plan  Continue long-acting insulin  Ventilator management per pulmonology  Tube feedings reinitiated per general surgery today  Potential LTAC placement if patient fails weaning trials once again    Medical Decision Making  Number and Complexity of problems:   1) exacerbation of respiratory failure with hypoxia secondary to pulmonary edema, acute, high complexity  2) PAF with RVR, acute, moderate complexity  3) meningioma versus intracranial infection, acute, high complexity  4) type 2 diabetes, chronic, moderate complexity  5) elevated liver enzymes acute moderate complexity  6) elevated WBC acute moderate complexity     Differential Diagnosis: Pulmonary embolus, LV dysfunction     Conditions and Status        Condition is unchanged.     MDM Data  External documents reviewed: No new data  Cardiac tracing (EKG, telemetry) interpretation: A-fib rate controlled  Radiology interpretation: Chest x-ray has increased interstitial markings consistent with fluid overload.  Labs reviewed:  reviewed  Any tests that were considered but not ordered: None     Decision rules/scores evaluated (example [id number], Wells, etc): [id number] score of 5 with a 7.2% risk of stroke annually     Discussed with:  nursing      Care Planning  Shared decision making: Patient is sedated  Code status and discussions: full     Disposition  Social Determinants of Health that impact treatment or disposition: none  I expect  the patient to be discharged by the primary service.    Electronically signed by Ramin Singh DO, 04/27/23, 09:43 CDT.      Electronically signed by Ramin Singh DO at 04/27/23 0948     Rahul Arnold APRN at 04/27/23 0815          Neurosurgery Daily Progress Note    HPI:  Elijah Escobar is a 67 y.o. male with a significant medical history of hypertension, diabetes, hyperlipidemia, seizures, craniotomy for tumor (6/16/2022), craniotomy for washout (7/1/2022), craniectomy (10/4/2022), coronary artery disease, diabetes, erectile dysfunction, GERD, hypertension, hyperlipidemia, tobacco abuse, and obesity.  He presents today with a complaint of a 4-5 days onset of progressively worsening right frontal, temporal, and periorbital edema and associated symptoms to include, but not limited to generalized fatigue, chills, dyspnea, intermittent nausea without vomiting, and states he's felt feverish.  Physical exam findings of neurologically intact with right frontal, temporal, and periorbital swelling.  WBC elevated at 15.  3 to an CRP elevated at 14.75.  Imaging pending.    Assessment:   Past Medical History:   Diagnosis Date   • Brain tumor    • Coronary artery disease    • COVID-19 vaccine series completed     MODERNA X 3; LAST DOSE 3/2022   • Diabetes    • Erectile dysfunction    • GERD (gastroesophageal reflux disease)    • Hypercholesteremia    • Hypertension    • Seizure      Active Hospital Problems    Diagnosis    • **Swelling of scalp    • Central apnea    • Elevated liver function tests    • COPD (chronic obstructive pulmonary disease)    • Obesity (BMI 30-39.9)    • Tobacco abuse, in remission    • Acute systolic heart failure    • ADDIE (obstructive sleep apnea)    • Type 2 myocardial infarction due to arrhythmia    • Acute pulmonary edema due to A-fib RVR    • Acute respiratory failure with hypoxia    • Intracranial epidural hematoma    • Post-operative infection    • Paroxysmal atrial fibrillation  with rapid ventricular response    • Type 2 diabetes mellitus with hyperglycemia and peripheral neuropathy, with long-term current use of insulin (HCC)    • Coronary artery disease    • Meningioma s/p craniotomy      Plan:   Neuro:   Unchanged.  Opens eyes to voice.  Does not follow commands.  Minimal motor response.      Infected cranial flap      POD #21 (4/4/2023)  craniectomy and washout    Postop code CT and MRI stable. No acute abnormalities.    Flap tapped I&D cultures 4+ gram-negative bacilli    Heavy 4+ Serratia marcescens     POD #14 (4/13/2023) evacuation of scalp hematoma    JUAN removed     Continue neuro exams per policy.  Call for decline  EEG shows no epileptiform activity or ongoing seizures    2 Days Post-Op tracheostomy and PEG tube placement.    Appreciate ENT and general surgery    CV: Code for PEA, 4/16/2023.  Required epinephrine and bicarb   AFib/flutter, rate controlled.   Hold all anticoagulation for 2 weeks.   No evidence of DVT or PE per imaging   Appreciate cardiology  Pulm: Ventilation per trach    : DC Jeong catheter  FEN: Enteral feeding per PEG tube  Endocrine: Continue SSI  GI: SHERIF.  +BM  ID:  Infected cranial flap  CSF: no growth to date   PNA.  Resolved   UTI.  Resolved   Continue antibiotics.  Remains on Levaquin   Heme:  DVT prophylaxis with SCDs.  Hold all anticoagulation for 2 weeks.  Pain: No complaints at present  Dispo: PT/OT   No change in neuro status in 48 hours proceed with palliative care consult     Chief complaint:   4-5 days onset of progressively worsening right frontal, temporal, and periorbital edema and associated symptoms to include, but not limited to generalized fatigue, chills, dyspnea, intermittent nausea without vomiting, and states he's felt feverish.    HPI  Subjective:  Symptoms:  Stable.      Temp:  [98.3 °F (36.8 °C)-99.1 °F (37.3 °C)] 98.8 °F (37.1 °C)  Heart Rate:  [] 130  Resp:  [18-27] 25  BP: (100-146)/(60-91) 135/84  FiO2 (%):  [40 %] 40  "%    Output by Drain (mL) 04/26/23 0701 - 04/26/23 1900 04/26/23 1901 - 04/27/23 0700 04/27/23 0701 - 04/27/23 1136 Range Total   Requested LDAs do not have output data documented.     Objective:  Vital signs: (most recent): Blood pressure 135/84, pulse (!) 130, temperature 98.8 °F (37.1 °C), temperature source Axillary, resp. rate 25, height 177.8 cm (70\"), weight 121 kg (266 lb 12.8 oz), SpO2 94 %.      Neurologic Exam     Mental Status     Opens eyes to voice.  Does not follow commands.       Cranial Nerves     CN III, IV, VI   Pupils are equal, round, and reactive to light.    CN VII   Right facial weakness: none  Left facial weakness: none    CN IX, X   Right gag reflex: normal  Left gag reflex: normal    Motor Exam   Minimal motor response to pain.       Drains: * No LDAs found *    Imaging Results (Last 24 Hours)     Procedure Component Value Units Date/Time    XR Chest 1 View [[id number]] Collected: 04/27/23 0729     Updated: 04/27/23 0733    Narrative:      HISTORY: Intubated and sedated     CXR: A frontal view the chest obtained     COMPARISON: 04/26/2023     FINDINGS: Tracheostomy tube remains appropriate in position. Right IJ  central line tip projects near The atrial caval junction. Hypoventilated  lungs. Prominent pulmonary vascular structures with left basilar  consolidation. Questionable small left pleural effusion. No  pneumothorax. Stable mediastinal contours.       Impression:      1. Stable 1 day view the chest.  This report was finalized on 04/27/2023 07:30 by Dr. Coleen Terry MD.        Lab Results (last 24 hours)     Procedure Component Value Units Date/Time    Blood Gas, Arterial - [[id number]]  (Abnormal) Collected: 04/27/23 0407    Specimen: Arterial Blood Updated: 04/27/23 0407     Site Left Radial     Marek's Test Positive     pH, Arterial 7.521 pH units      Comment: 83 Value above reference range        pCO2, Arterial 43.8 mm Hg      pO2, Arterial 96.5 mm Hg      HCO3, Arterial 35.8 " mmol/L      Comment: 83 Value above reference range        Base Excess, Arterial 11.8 mmol/L      Comment: 83 Value above reference range        O2 Saturation, Arterial 98.8 %      Temperature 37.0 C      Barometric Pressure for Blood Gas 749 mmHg      Modality Ventilator     FIO2 40 %      Ventilator Mode AC     Set Tidal Volume 500     Set Mech Resp Rate 12.0     PEEP 7.0     Collected by [id number]     Comment: Meter: [id number]     :  DCHESEBR        pCO2, Temperature Corrected 43.8 mm Hg      pH, Temp Corrected 7.521 pH Units      pO2, Temperature Corrected 96.5 mm Hg     Comprehensive Metabolic Panel [[id number]]  (Abnormal) Collected: 04/27/23 0223    Specimen: Blood Updated: 04/27/23 0339     Glucose 190 mg/dL      BUN 29 mg/dL      Creatinine 1.07 mg/dL      Sodium 141 mmol/L      Potassium 3.5 mmol/L      Chloride 100 mmol/L      CO2 33.0 mmol/L      Calcium 8.1 mg/dL      Total Protein 5.9 g/dL      Albumin 2.8 g/dL      ALT (SGPT) 12 U/L      AST (SGOT) 15 U/L      Alkaline Phosphatase 119 U/L      Total Bilirubin 0.5 mg/dL      Globulin 3.1 gm/dL      A/G Ratio 0.9 g/dL      BUN/Creatinine Ratio 27.1     Anion Gap 8.0 mmol/L      eGFR 76.1 mL/min/1.73     Narrative:      GFR Normal >60  Chronic Kidney Disease <60  Kidney Failure <15      POC Glucose Once [[id number]]  (Abnormal) Collected: 04/27/23 0017    Specimen: Blood Updated: 04/27/23 0028     Glucose 197 mg/dL      Comment: : tocalec WangMeter ID: [id number]       POC Glucose Once [[id number]]  (Abnormal) Collected: 04/26/23 2021    Specimen: Blood Updated: 04/26/23 2032     Glucose 145 mg/dL      Comment: : [id number] Will HurtadoMeter ID: [id number]       POC Glucose Once [[id number]]  (Abnormal) Collected: 04/26/23 1709    Specimen: Blood Updated: 04/26/23 1734     Glucose 221 mg/dL      Comment: : [id number] Garry Skinner ID: [id number]       POC Glucose Once [[id number]]  (Normal) Collected:  04/26/23 1123    Specimen: Blood Updated: 04/26/23 1139     Glucose 125 mg/dL      Comment: : [id number] Garry Jacobs  Susanne ID: [id number]           ADITYA Cespedes      Electronically signed by Rahul Arnold APRN at 04/27/23 1137       Consult Notes (last 24 hours)  Notes from 04/27/23 0609 through 04/28/23 0609   No notes of this type exist for this encounter.

## 2023-04-28 NOTE — PROGRESS NOTES
Neurosurgery Daily Progress Note    HPI:  Elijah Escobar is a 67 y.o. male with a significant medical history of hypertension, diabetes, hyperlipidemia, seizures, craniotomy for tumor (6/16/2022), craniotomy for washout (7/1/2022), craniectomy (10/4/2022), coronary artery disease, diabetes, erectile dysfunction, GERD, hypertension, hyperlipidemia, tobacco abuse, and obesity.  He presents today with a complaint of a 4-5 days onset of progressively worsening right frontal, temporal, and periorbital edema and associated symptoms to include, but not limited to generalized fatigue, chills, dyspnea, intermittent nausea without vomiting, and states he's felt feverish.  Physical exam findings of neurologically intact with right frontal, temporal, and periorbital swelling.  WBC elevated at 15.  3 to an CRP elevated at 14.75.  Imaging pending.    Assessment:   Past Medical History:   Diagnosis Date   • Brain tumor    • Coronary artery disease    • COVID-19 vaccine series completed     MODERNA X 3; LAST DOSE 3/2022   • Diabetes    • Erectile dysfunction    • GERD (gastroesophageal reflux disease)    • Hypercholesteremia    • Hypertension    • Seizure      Active Hospital Problems    Diagnosis    • **Swelling of scalp    • Central apnea    • Elevated liver function tests    • COPD (chronic obstructive pulmonary disease)    • Obesity (BMI 30-39.9)    • Tobacco abuse, in remission    • Acute systolic heart failure    • ADDIE (obstructive sleep apnea)    • Type 2 myocardial infarction due to arrhythmia    • Acute pulmonary edema due to A-fib RVR    • Acute respiratory failure with hypoxia    • Intracranial epidural hematoma    • Post-operative infection    • Paroxysmal atrial fibrillation with rapid ventricular response    • Type 2 diabetes mellitus with hyperglycemia and peripheral neuropathy, with long-term current use of insulin (HCC)    • Coronary artery disease    • Meningioma s/p craniotomy      Plan:   Neuro: Does not open  eyes to voice.  Does not follow commands.  Minimal motor response to painful stimuli.      Infected cranial flap      POD #22 (4/4/2023)  craniectomy and washout    Postop code CT and MRI stable. No acute abnormalities.    Flap tapped I&D cultures 4+ gram-negative bacilli    Heavy 4+ Serratia marcescens     POD #15 (4/13/2023) evacuation of scalp hematoma    JUAN removed     Continue neuro exams per policy.  Call for decline  EEG shows no epileptiform activity or ongoing seizures    3 Days Post-Op tracheostomy and PEG tube placement.    Appreciate ENT and general surgery    CV: Code for PEA, 4/16/2023.  Required epinephrine and bicarb   AFib/flutter, rate controlled.   Hold all anticoagulation for 2 weeks.   No evidence of DVT or PE per imaging   Appreciate cardiology  Pulm: Ventilation per trach   Increased pulmonary edema per CXR.    : DC Jeong catheter  FEN: Enteral feeding per PEG tube  Endocrine: Continue SSI  GI: SHERIF.  +BM  ID:  Infected cranial flap  CSF: no growth to date   PNA.  Resolved   UTI.  Resolved   Continue antibiotics.  Remains on Levaquin   Heme:  DVT prophylaxis with SCDs.  Hold all anticoagulation for 2 weeks.  Pain: No complaints at present  Dispo: PT/OT   Palliative care versus LTAC.  Decision pending conversation with family.     Chief complaint:   4-5 days onset of progressively worsening right frontal, temporal, and periorbital edema and associated symptoms to include, but not limited to generalized fatigue, chills, dyspnea, intermittent nausea without vomiting, and states he's felt feverish.    HPI  Subjective:  Symptoms:  Stable.      Temp:  [97.4 °F (36.3 °C)-99.8 °F (37.7 °C)] 99.2 °F (37.3 °C)  Heart Rate:  [] 109  Resp:  [21-31] 23  BP: ()/() 139/85  FiO2 (%):  [40 %-100 %] 100 %    Output by Drain (mL) 04/27/23 0701 - 04/27/23 1900 04/27/23 1901 - 04/28/23 0700 04/28/23 0701 - 04/28/23 0908 Range Total   Requested LDAs do not have output data documented.  "    Objective:  Vital signs: (most recent): Blood pressure 139/85, pulse 109, temperature 99.2 °F (37.3 °C), temperature source Axillary, resp. rate 23, height 177.8 cm (70\"), weight 120 kg (265 lb 1.6 oz), SpO2 94 %.      Neurologic Exam     Mental Status     Does not open eyes to voice.  Does not follow commands.       Cranial Nerves     CN III, IV, VI   Pupils are equal, round, and reactive to light.    CN VII   Right facial weakness: none  Left facial weakness: none    CN IX, X   Right gag reflex: normal  Left gag reflex: normal    Motor Exam   Minimal motor response to pain to painful stimuli.       Drains: * No LDAs found *    Imaging Results (Last 24 Hours)     Procedure Component Value Units Date/Time    XR Chest 1 View [[id number]] Collected: 04/28/23 0720     Updated: 04/28/23 0724    Narrative:      HISTORY: Intubated and sedated     CXR: A frontal view the chest obtained     COMPARISON: 04/27/2023     FINDINGS: Tracheostomy tube remains appropriate in position. A right IJ  central line tip projects near the atrial caval junction. Similar  enlargement of the cardiac silhouette with diffuse bilateral pulmonary  opacities. Questionable trace pleural effusions with no pneumothorax.       Impression:      1. Stable 1 day view the chest. Persistent diffuse bilateral pulmonary  opacities suggestive for pulmonary edema and/or multifocal pneumonia.  This report was finalized on 04/28/2023 07:21 by Dr. Coleen Terry MD.    XR Chest 1 View [[id number]] Collected: 04/28/23 0709     Updated: 04/28/23 0714    Narrative:      EXAMINATION: XR CHEST 1 VW- 4/28/2023 7:09 AM CDT     HISTORY: S06.4X0A-Epidural hemorrhage without loss of consciousness,  initial encounter; Z74.09-Other reduced mobility; Z98.890-Other  specified postprocedural states; T81.42XA-Infection following a  procedure, deep incisional surgical site, initial encounter;  D32.9-Benign neoplasm of meninges, unspecified; E11.65-Type 2 diabetes  mellitus " with hyperglycemia;     REPORT: A frontal view of the chest was obtained.     COMPARISON: Chest x-ray 4/27/2023 0334 hours.     The tracheostomy tube and right internal jugular central line appear in  satisfactory position as before. There is interval increase in perihilar  infiltrates and vascular congestion, greater on the left, likely related  to pulmonary edema. Pneumonia would be less likely given the relatively  rapid change. No pneumothorax or pleural effusion is identified. Heart  size appears normal. The osseous structures are unremarkable.       Impression:      Stable satisfactory position of the right IJ central line  and tracheostomy tube. Increasing central infiltrates most likely  related to pulmonary edema. This is somewhat asymmetric and greater on  the left.  This report was finalized on 04/28/2023 07:11 by Dr. Elijah Grover MD.        Lab Results (last 24 hours)     Procedure Component Value Units Date/Time    POC Glucose Once [[id number]]  (Abnormal) Collected: 04/28/23 0604    Specimen: Blood Updated: 04/28/23 0614     Glucose 155 mg/dL      Comment: : [id number] Francisco CharidaMeter ID: [id number]       Blood Gas, Arterial - [[id number]]  (Abnormal) Collected: 04/28/23 0421    Specimen: Arterial Blood Updated: 04/28/23 0420     Site Right Radial     Marek's Test Positive     pH, Arterial 7.455 pH units      Comment: 83 Value above reference range        pCO2, Arterial 51.0 mm Hg      Comment: 83 Value above reference range        pO2, Arterial 88.8 mm Hg      HCO3, Arterial 35.8 mmol/L      Comment: 83 Value above reference range        Base Excess, Arterial 10.4 mmol/L      Comment: 83 Value above reference range        O2 Saturation, Arterial 97.5 %      Temperature 37.0 C      Barometric Pressure for Blood Gas 742 mmHg      Modality Ventilator     FIO2 100 %      Ventilator Mode AC     Set Tidal Volume 500     Set Mech Resp Rate 12.0     PEEP 10.0     Collected by [id number]     Comment:  Meter: [id number]     :  [id number]        pCO2, Temperature Corrected 51.0 mm Hg      pH, Temp Corrected 7.455 pH Units      pO2, Temperature Corrected 88.8 mm Hg     Blood Culture - Blood, Arm, Right [[id number]] Collected: 04/28/23 0130    Specimen: Blood from Arm, Right Updated: 04/28/23 0147    Blood Culture - Blood, Arm, Right [[id number]] Collected: 04/28/23 0100    Specimen: Blood from Arm, Right Updated: 04/28/23 0147    Lactic Acid, Plasma [[id number]]  (Abnormal) Collected: 04/28/23 0014    Specimen: Blood Updated: 04/28/23 0112     Lactate 2.3 mmol/L     Comprehensive Metabolic Panel [[id number]]  (Abnormal) Collected: 04/28/23 0014    Specimen: Blood Updated: 04/28/23 0110     Glucose 196 mg/dL      BUN 29 mg/dL      Creatinine 1.25 mg/dL      Sodium 138 mmol/L      Potassium 4.2 mmol/L      Comment: Slight hemolysis detected by analyzer. Results may be affected.        Chloride 95 mmol/L      CO2 30.0 mmol/L      Calcium 8.3 mg/dL      Total Protein 6.3 g/dL      Albumin 3.3 g/dL      ALT (SGPT) 15 U/L      AST (SGOT) 29 U/L      Alkaline Phosphatase 136 U/L      Total Bilirubin 0.5 mg/dL      Globulin 3.0 gm/dL      A/G Ratio 1.1 g/dL      BUN/Creatinine Ratio 23.2     Anion Gap 13.0 mmol/L      eGFR 63.1 mL/min/1.73     Narrative:      GFR Normal >60  Chronic Kidney Disease <60  Kidney Failure <15      C-reactive Protein [[id number]]  (Abnormal) Collected: 04/28/23 0014    Specimen: Blood Updated: 04/28/23 0110     C-Reactive Protein 7.05 mg/dL     BNP [[id number]]  (Abnormal) Collected: 04/28/23 0014    Specimen: Blood Updated: 04/28/23 0110     proBNP 16,714.0 pg/mL     Narrative:      Among patients with dyspnea, NT-proBNP is highly sensitive for the detection of acute congestive heart failure. In addition NT-proBNP of <300 pg/ml effectively rules out acute congestive heart failure with 99% negative predictive value.      POC Glucose Once [[id number]]  (Abnormal) Collected: 04/28/23 0025     Specimen: Blood Updated: 04/28/23 0037     Glucose 219 mg/dL      Comment: : [id number] Francisco CharidaMeter ID: [id number]       POC Glucose Once [[id number]]  (Abnormal) Collected: 04/27/23 2025    Specimen: Blood Updated: 04/27/23 2035     Glucose 152 mg/dL      Comment: : [id number] Francisco CharidaMeter ID: [id number]       POC Glucose Once [[id number]]  (Abnormal) Collected: 04/27/23 1801    Specimen: Blood Updated: 04/27/23 1821     Glucose 153 mg/dL      Comment: : [id number] Phoenix Guevara [employer] 5th Baseeter ID: [id number]       POC Glucose Once [[id number]]  (Abnormal) Collected: 04/27/23 1201    Specimen: Blood Updated: 04/27/23 1222     Glucose 193 mg/dL      Comment: : [id number] [place] Guevara [employer]eter ID: [id number]           Rahul Arnold APRN

## 2023-04-28 NOTE — PROGRESS NOTES
"Pharmacy Dosing Service  Pharmacokinetics  Vancomycin Initial Evaluation  Assessment/Action/Plan:  Loading dose?: none  Current Order: Vancomycin 1000 mg IVPB every 12 hours  Current end date:05/04/2023  Levels: none  Additional antimicrobial agent(s): Zosyn 3.375 g every 8 hours    Vancomycin dosage initiated based on population pharmacokinetic parameters. Pharmacy will continue to follow daily and adjust dose accordingly.     Subjective:  Elijah Escobar is a 67 y.o. male with a Vancomycin \"Pharmacy to Dose\" consult for the treatment of pneumonia , day 1 of 7 of treatment.    AUC Model Data:  Loading dose: N/A  Regimen: 1000 mg IV every 12 hours.  Start time: 00:47 on 04/28/2023  Exposure target: AUC24 (range)400-600 mg/L.hr   AUC24,ss: 600 mg/L.hr  PAUC*: 79 %  Ctrough,ss: 18.7 mg/L  Pconc*: 46 %  Tox.: 15 %    Objective:  Ht: 177.8 cm (70\"); Wt: 121 kg (266 lb 12.8 oz)  Estimated Creatinine Clearance: 87.4 mL/min (by C-G formula based on SCr of 1.07 mg/dL).   Creatinine   Date Value Ref Range Status   04/27/2023 1.07 0.76 - 1.27 mg/dL Final   04/26/2023 1.07 0.76 - 1.27 mg/dL Final   04/25/2023 1.03 0.76 - 1.27 mg/dL Final   08/31/2022 0.80 0.60 - 1.30 mg/dL Final     Comment:     Serial Number: [id number]:  [id number]   10/21/2020 1.03 0.60 - 1.30 mg/dL Final   02/07/2020 1.00 0.60 - 1.30 mg/dL Final   02/07/2020 1.00 0.60 - 1.30 mg/dL Final      Lab Results   Component Value Date    WBC 10.38 04/23/2023    WBC 13.61 (H) 04/19/2023    WBC 16.70 (H) 04/18/2023      Baseline culture results:  Microbiology Results (last 10 days)       Procedure Component Value - Date/Time    Chlamydia trachomatis, Neisseria gonorrhoeae, PCR - Swab, Penis [[id number]]  (Normal) Collected: 04/19/23 0936    Lab Status: Final result Specimen: Swab from Penis Updated: 04/19/23 1425     Chlamydia DNA by PCR Not Detected     Neisseria gonorrhoeae by PCR Not Detected    Narrative:      Disclaimer: The Aptima Combo 2 assay is a target " "amplification nucleic acid probe test that utilizes target capture for the in vitro qualitative detection and differentiation of ribosomal RNA from Chlamydia trachomatis and Neisseria gonorrhoeae to aid in the diagnosis of chlamydial and/or gonococcal urogenital disease.  Cell culture was once considered to be the \"gold standard\" for detection of CT and NG.  Culture is quite specific, but scientific studies have demonstrated that the NAAT DNA probe technologies have higher clinical sensitivities than culture.    Genital Culture - Swab, Penis [[id number]] Collected: 04/19/23 0936    Lab Status: Final result Specimen: Swab from Penis Updated: 04/22/23 0549     Genital Culture No growth at 3 days     Gram Stain Few (2+) WBCs seen      No organisms seen    Urine Culture - Urine, Indwelling Urethral Catheter [[id number]]  (Normal) Collected: 04/19/23 0307    Lab Status: Final result Specimen: Urine from Indwelling Urethral Catheter Updated: 04/20/23 1207     Urine Culture No growth    Blood Culture - Blood, Hand, Right [[id number]]  (Normal) Collected: 04/19/23 0209    Lab Status: Final result Specimen: Blood from Hand, Right Updated: 04/24/23 0230     Blood Culture No growth at 5 days    Blood Culture - Blood, Hand, Left [[id number]]  (Normal) Collected: 04/19/23 0207    Lab Status: Final result Specimen: Blood from Hand, Left Updated: 04/24/23 0230     Blood Culture No growth at 5 days            Wagner Sims, PharmD  04/27/23 23:49 CDT   "

## 2023-04-28 NOTE — PLAN OF CARE
Goal Outcome Evaluation:  Plan of Care Reviewed With: patient        Progress: declining  Outcome Evaluation: PRN Morphine given for vent dyssynchrony. Sedation restarted this afternoon after respiratory decline. Family conference with MD for plan of care. Trach very positional causing vent to alarm

## 2023-04-28 NOTE — CASE MANAGEMENT/SOCIAL WORK
Continued Stay Note   Khadar     Patient Name: Elijah Escobar  MRN: [id number]  Today's Date: 4/28/2023    Admit Date: 3/31/2023    Plan: Pending   Discharge Plan     Row Name 04/28/23 1051       Plan    Plan Pending    Plan Comments Patient continues on vent.  SW following.               Discharge Codes    No documentation.                     TIANA Syed

## 2023-04-28 NOTE — PROGRESS NOTES
[facility] PULMONARY AND CRITICAL CARE PROGRESS NOTE - [facility]    Patient: Elijah Escobar    1955    MR# [id number]    Acct# [id number]  04/28/23   11:21 CDT  Referring Provider: Flaco Portillo, *    Chief Complaint: Mechanically ventilated/acute respiratory failure    Interval history:   Patient is being currently maintained on the mechanical ventilation.  Through the tracheostomy.  He has 8.5 Shiley tracheostomy placed.  The assist-control rate is 12 tidal volume 500 FiO2 40% PEEP +7.  The patient has been noted to be tolerating the settings fairly well.  He gives anxious and agitated while we are in the room.  However he does not have purposeful reaction to our presence.  Does not establish eye contact.  CNS wise he does not appear to be having signs of CNS recovery otherwise.      Meds:  aspirin, 81 mg, Oral, Daily  atorvastatin, 20 mg, Oral, Nightly  budesonide, 0.5 mg, Nebulization, BID - RT  Chlorhexidine Gluconate Cloth, 1 application, Topical, Q24H  digoxin, 250 mcg, Oral, Daily  dilTIAZem, 90 mg, Oral, Q8H  furosemide, 40 mg, Intravenous, BID  gabapentin, 300 mg, Oral, Q8H  insulin detemir, 25 Units, Subcutaneous, Q12H  insulin lispro, 0-14 Units, Subcutaneous, Q6H  ipratropium, 0.5 mg, Nebulization, 4x Daily - RT  levETIRAcetam, 500 mg, Oral, BID  levoFLOXacin, 500 mg, Oral, Q24H  methylnaltrexone, 12 mg, Subcutaneous, Every Other Day  metoclopramide, 10 mg, Oral, 4x Daily AC & at Bedtime  metoprolol tartrate, 50 mg, Oral, Q12H  multivitamin with minerals, 1 tablet, Oral, Daily  pantoprazole, 40 mg, Intravenous, Q AM  piperacillin-tazobactam, 3.375 g, Intravenous, Q8H  polyethylene glycol, 17 g, Oral, Daily  sennosides-docusate, 1 tablet, Oral, Daily  sodium chloride, 10 mL, Intravenous, Q12H  sucralfate, 1 g, Oral, 4x Daily AC & at Bedtime  vancomycin, 1,000 mg, Intravenous, Q12H      Pharmacy Consult - Pharmacy to dose,   propofol, 5-50 mcg/kg/min, Last Rate: Stopped  (04/28/23 0705)        Ventilator Settings:        Resp Rate (Set): 12 tidal volume 500 FiO2 40%.  FiO2 (%): 80 %  PEEP/CPAP (cm H2O): 10 cm H20  Minute Ventilation (L/min) (Obs): 11.2 L/min  Resp Rate (Observed) Vent: 29  I:E Ratio (Set): 1:4.45  I:E Ratio (Obs): 1:1  PIP Observed (cm H2O): 21 cm H2O  RSBI: 0  Physical Exam:  Temp:  [97.4 °F (36.3 °C)-99.8 °F (37.7 °C)] 99.2 °F (37.3 °C)  Heart Rate:  [] 112  Resp:  [21-31] 25  BP: ()/() 132/87  FiO2 (%):  [40 %-100 %] 80 %    Intake/Output Summary (Last 24 hours) at 4/28/2023 1121  Last data filed at 4/28/2023 0749  Gross per 24 hour   Intake 1911.22 ml   Output 950 ml   Net 961.22 ml     SpO2 Percentage    04/28/23 1022 04/28/23 1029 04/28/23 1100   SpO2: 94% 96% 91%   Body mass index is 38.04 kg/m².     Constitutional    Patient remains on mechanical relation.  AC 12.500 FiO2 80% PEEP +10.  The patient appears to be irritable when we approach him.  Other than that the patient is not purposefully following any commands.    HENT:      Head: Normocephalic. Right scalp wound, well-healed, no redness warmth or drainage     Nose: Nose normal.      Mouth/Throat: Tracheostomy in place a 8.5 Shiley.  However patient still has some leaking despite the cough is fully inflated.    Eyes:      General:         Right eye: No discharge.         Left eye: No discharge.   Cardiovascular:      Rate and Rhythm: tachycardia   Pulmonary:      Effort: Tachypnea was present.     Breath sounds: Bilateral entry was present however diminished on the basis sounds.  Few rhonchi noted to be scattered.    Abdominal:      General: Abdomen is flat.      Palpations: Abdomen is soft.    Musculoskeletal:      General: Bilateral wrist restraints, SCDs      Right lower leg: Edema      Left lower leg: Edema   Skin:     General: Skin is warm and dry.      Coloration: Skin is not jaundiced or pale.   Neurological:      General: On tracheostomy.             Physician substantive  "portion: medical decision making  Result Review  Laboratory Data:  Results from last 7 days   Lab Units 04/23/23  2354   WBC 10*3/mm3 10.38   HEMOGLOBIN g/dL 10.0*   PLATELETS 10*3/mm3 245     Results from last 7 days   Lab Units 04/28/23  0014 04/27/23  0223 04/26/23  0300   SODIUM mmol/L 138 141 140   POTASSIUM mmol/L 4.2 3.5 4.0   CO2 mmol/L 30.0* 33.0* 33.0*   BUN mg/dL 29* 29* 28*   CREATININE mg/dL 1.25 1.07 1.07   LACTATE mmol/L 2.3*  --   --    CRP mg/dL 7.05*  --   --      Results from last 7 days   Lab Units 04/28/23  0421 04/27/23  0407 04/26/23  0343   PH, ARTERIAL pH units 7.455* 7.521* 7.481*   PCO2, ARTERIAL mm Hg 51.0* 43.8 46.0*   PO2 ART mm Hg 88.8 96.5 68.2*   FIO2 % 100 40 30     Microbiology Results (last 10 days)     Procedure Component Value - Date/Time    Chlamydia trachomatis, Neisseria gonorrhoeae, PCR - Swab, Penis [[id number]]  (Normal) Collected: 04/19/23 0936    Lab Status: Final result Specimen: Swab from Penis Updated: 04/19/23 1425     Chlamydia DNA by PCR Not Detected     Neisseria gonorrhoeae by PCR Not Detected    Narrative:      Disclaimer: The Aptima Combo 2 assay is a target amplification nucleic acid probe test that utilizes target capture for the in vitro qualitative detection and differentiation of ribosomal RNA from Chlamydia trachomatis and Neisseria gonorrhoeae to aid in the diagnosis of chlamydial and/or gonococcal urogenital disease.  Cell culture was once considered to be the \"gold standard\" for detection of CT and NG.  Culture is quite specific, but scientific studies have demonstrated that the NAAT DNA probe technologies have higher clinical sensitivities than culture.    Genital Culture - Swab, Penis [[id number]] Collected: 04/19/23 0936    Lab Status: Final result Specimen: Swab from Penis Updated: 04/22/23 0549     Genital Culture No growth at 3 days     Gram Stain Few (2+) WBCs seen      No organisms seen    Urine Culture - Urine, Indwelling Urethral Catheter " [[id number]]  (Normal) Collected: 04/19/23 0307    Lab Status: Final result Specimen: Urine from Indwelling Urethral Catheter Updated: 04/20/23 1207     Urine Culture No growth    Blood Culture - Blood, Hand, Right [[id number]]  (Normal) Collected: 04/19/23 0209    Lab Status: Final result Specimen: Blood from Hand, Right Updated: 04/24/23 0230     Blood Culture No growth at 5 days    Blood Culture - Blood, Hand, Left [[id number]]  (Normal) Collected: 04/19/23 0207    Lab Status: Final result Specimen: Blood from Hand, Left Updated: 04/24/23 0230     Blood Culture No growth at 5 days         Recent films:  XR Chest 1 View    Result Date: 4/28/2023  HISTORY: Intubated and sedated  CXR: A frontal view the chest obtained  COMPARISON: 04/27/2023  FINDINGS: Tracheostomy tube remains appropriate in position. A right IJ central line tip projects near the atrial caval junction. Similar enlargement of the cardiac silhouette with diffuse bilateral pulmonary opacities. Questionable trace pleural effusions with no pneumothorax.      Impression: 1. Stable 1 day view the chest. Persistent diffuse bilateral pulmonary opacities suggestive for pulmonary edema and/or multifocal pneumonia. This report was finalized on 04/28/2023 07:21 by Dr. Coleen Terry MD.    XR Chest 1 View    Result Date: 4/28/2023  EXAMINATION: XR CHEST 1 VW- 4/28/2023 7:09 AM CDT  HISTORY: S06.4X0A-Epidural hemorrhage without loss of consciousness, initial encounter; Z74.09-Other reduced mobility; Z98.890-Other specified postprocedural states; T81.42XA-Infection following a procedure, deep incisional surgical site, initial encounter; 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  COMPARISON: Chest x-ray 4/27/2023 0334 hours.  The tracheostomy tube and right internal jugular central line appear in satisfactory position as before. There is interval increase in  perihilar infiltrates and vascular congestion, greater on the left, likely related to pulmonary edema. Pneumonia would be less likely given the relatively rapid change. No pneumothorax or pleural effusion is identified. Heart size appears normal. The osseous structures are unremarkable.      Impression: Stable satisfactory position of the right IJ central line and tracheostomy tube. Increasing central infiltrates most likely related to pulmonary edema. This is somewhat asymmetric and greater on the left. This report was finalized on 04/28/2023 07:11 by Dr. Elijah Grover MD.    XR Chest 1 View    Result Date: 4/27/2023  HISTORY: Intubated and sedated  CXR: A frontal view the chest obtained  COMPARISON: 04/26/2023  FINDINGS: Tracheostomy tube remains appropriate in position. Right IJ central line tip projects near The atrial caval junction. Hypoventilated lungs. Prominent pulmonary vascular structures with left basilar consolidation. Questionable small left pleural effusion. No pneumothorax. Stable mediastinal contours.      Impression: 1. Stable 1 day view the chest. This report was finalized on 04/27/2023 07:30 by Dr. Coleen Terry MD.     Personal review of imaging: CXR shows Reviewed current imaging study.  It showed increased interstitial opacities and vascular congestion likely from pulm edema.  No lung consolidation.  Tubes and lines are in position.      Pulmonary Assessment:  1. Acute on chronic hypoxic respiratory failure on mechanical ventilation/s/p tracheostomy PEG tube.  2. Chronic congestive diastolic heart failure  3. Atrial fibrillation  4. COPD with history of tobacco abuse  5. Meningioma s/p craniotomy, cranioplasty and flap infection.    6. Encephalopathy/patient not showing signs of CNS responsiveness.  7. Hypertension  8. Diabetes mellitus insulin-dependent  9. Coronary artery disease  10. History obstructive sleep apnea syndrome.  11. Seizure disorder  12. Chronic  pain  13.     Recommend/plan:   · Yesterday the patient had to be given an extra dose of Lasix around midnight.  · No weaning will be attempted today.    · This is believed to be due to the atelectasis.  Chest x-ray to be followed in AM..  · We will maintain the patient on assist-control mode.    · Currently the treatment and management of this patient will be coordinated with the other attendings the case.  ID is already on follow-up on this patient.  Continue to provide antimicrobial therapy.  I have added yesterday Vanco plus Zosyn.    Patient has been seen by evaluated by Dr. Arreola.  Full plan of management and impression was formulated by myself.  Critical care time provided to the patient is estimated to be 50 minutes.    Electronically signed by               Espinoza Arreola MD,  Pulmonologist/Intensivist   4/28/2023, 11:21 CDT

## 2023-04-28 NOTE — THERAPY TREATMENT NOTE
Acute Care - Physical Therapy Treatment Note  [facility]     Patient Name: Elijah Escobar  : 1955  MRN: [id number]  Today's Date: 2023      Visit Dx:     ICD-10-CM ICD-9-CM   1. Intracranial epidural hematoma  S06.4X0A 852.40   2. Impaired mobility  Z74.09 799.89   3. S/P craniotomy  Z98.890 V45.89   4. Infection of deep incisional surgical site after procedure, initial encounter  T81.42XA 998.59   5. Meningioma s/p craniotomy  D32.9 225.2   6. Type 2 diabetes mellitus with hyperglycemia and peripheral neuropathy, with long-term current use of insulin (HCC)  E11.65 250.00    Z79.4 790.29     V58.67   7. Paroxysmal atrial fibrillation with rapid ventricular response  I48.0 427.31   8. Swelling of scalp  R22.0 784.2   9. Acute pulmonary edema due to A-fib RVR  J81.0 518.4   10. Acute respiratory failure with hypoxia  J96.01 518.81   11. Type 2 myocardial infarction due to arrhythmia  I49.9 427.9    I21.A1 410.90   12. Obesity (BMI 30-39.9)  E66.9 278.00   13. Tobacco abuse, in remission  F17.201 305.1   14. Acute systolic heart failure  I50.21 428.21   15. ADDIE (obstructive sleep apnea)  G47.33 327.23   16. Secondary hypertension  I15.9 405.99   17. Gastroesophageal reflux disease, unspecified whether esophagitis present  K21.9 530.81   18. Class 2 severe obesity due to excess calories with serious comorbidity and body mass index (BMI) of 38.0 to 38.9 in adult  E66.01 278.01    Z68.38 V85.38   19. Leukocytosis, unspecified type  D72.829 288.60   20. Anemia due to other cause, not classified  D64.89 285.8   21. Smoker  F17.200 305.1   22. History of cranioplasty  Z98.890 V45.89   23. Facial edema  R60.0 782.3   24. Coronary artery disease due to lipid rich plaque  I25.10 414.00    I25.83 414.3   25. Postoperative infection, unspecified type, initial encounter  T81.40XA 998.59   26. Chronic obstructive pulmonary disease, unspecified COPD type  J44.9 496   27. Respiratory insufficiency  R06.89 786.09      Patient Active Problem List   Diagnosis   • Meningioma s/p craniotomy   • Hypertension   • GERD (gastroesophageal reflux disease)   • Coronary artery disease   • Class 2 severe obesity due to excess calories with serious comorbidity and body mass index (BMI) of 38.0 to 38.9 in adult   • Type 2 diabetes mellitus with hyperglycemia and peripheral neuropathy, with long-term current use of insulin (HCC)   • Leukocytosis   • Other specified anemias   • Paroxysmal atrial fibrillation with rapid ventricular response   • Post-operative infection   • Smoker   • History of cranioplasty   • Facial edema   • Swelling of scalp   • Acute pulmonary edema due to A-fib RVR   • Acute respiratory failure with hypoxia   • Type 2 myocardial infarction due to arrhythmia   • COPD (chronic obstructive pulmonary disease)   • Obesity (BMI 30-39.9)   • Tobacco abuse, in remission   • Acute systolic heart failure   • ADDIE (obstructive sleep apnea)   • Intracranial epidural hematoma   • Elevated liver function tests   • Central apnea     Past Medical History:   Diagnosis Date   • Brain tumor    • Coronary artery disease    • COVID-19 vaccine series completed     MODERNA X 3; LAST DOSE 3/2022   • Diabetes    • Erectile dysfunction    • GERD (gastroesophageal reflux disease)    • Hypercholesteremia    • Hypertension    • Seizure      Past Surgical History:   Procedure Laterality Date   • BALLOON ANGIOPLASTY, ARTERY Right    • COLONOSCOPY     • CRANIECTOMY Right 7/1/2022    Procedure: CRANIECTOMY WITH INCISIONAL WASHOUT;  Surgeon: Felipe Banerjee MD;  Location:  [facility];  Service: Neurosurgery;  Laterality: Right;   • CRANIOPLASTY Right 10/4/2022    Procedure: CRANIOPLASTY right with Lumbar drain;  Surgeon: Flaco Portillo MD;  Location:  [facility];  Service: Neurosurgery;  Laterality: Right;   • CRANIOPLASTY N/A 4/4/2023    Procedure: CRANIOPLASTY, removal, right, horseshoe;  Surgeon: Flaco Portillo MD;  Location:  [facility];   Service: Neurosurgery;  Laterality: N/A;   • CRANIOTOMY Right 4/12/2023    Procedure: CRANIOTOMY; evacuation of right hematoma;  Surgeon: Flaco Portillo MD;  Location:  [facility];  Service: Neurosurgery;  Laterality: Right;   • CRANIOTOMY FOR TUMOR Right 6/16/2022    Procedure: CRANIOTOMY FOR TUMOR STERIOTACTIC WITH BRAIN LAB, right;  Surgeon: Flaco Portillo MD;  Location:  [facility];  Service: Neurosurgery;  Laterality: Right;   • ENDOSCOPY WITH GASTROSTOMY TUBE INSERTION N/A 4/25/2023    Procedure: ESOPHAGOGASTRODUODENOSCOPY WITH GASTROSTOMY TUBE INSERTION;  Surgeon: Geeta Abdalla MD;  Location:  [facility];  Service: General;  Laterality: N/A;   • TRACHEOSTOMY N/A 4/25/2023    Procedure: TRACHEOSTOMY;  Surgeon: Wagner Villarreal MD;  Location:  [facility];  Service: ENT;  Laterality: N/A;     PT Assessment (last 12 hours)     PT Evaluation and Treatment     Row Name 04/28/23 1887          Physical Therapy Time and Intention    Subjective Information no complaints  awake, non responsive  -     Document Type therapy note (daily note)  -JUAN     Mode of Treatment physical therapy  -JUAN     Comment Planned family conference today  -     Row Name 04/28/23 0971          General Information    Existing Precautions/Restrictions fall;oxygen therapy device and L/min  trach/ helmet if gets OOB  -JUAN     Row Name 04/28/23 0482          Bed Mobility    Rolling Left Parker (Bed Mobility) dependent (less than 25% patient effort);2 person assist  -JUAN     Row Name 04/28/23 2016          Motor Skills    Comments, Therapeutic Exercise PROM x15 all 4 extremities . Stretched B gastrocs.  -JUAN     Row Name             Wound 04/04/23 1111 Right temporal region Incision    Wound - Properties Group Placement Date: 04/04/23  -CHRISTIANO Placement Time: 1111  -CHRISTIANO Side: Right  -CHRISTIANO Location: temporal region  -CHRISTIANO Primary Wound Type: Incision  -CHRISTIANO    Retired Wound - Properties Group Placement Date: 04/04/23  -CHRISTIANO Placement  Time: 1111  -CHRISTIANO Side: Right  -CHRISTIANO Location: temporal region  -CHRISTIANO Primary Wound Type: Incision  -CHRISTIANO    Retired Wound - Properties Group Date first assessed: 04/04/23  -CHRISTIANO Time first assessed: 1111  -CHRISTIANO Side: Right  -CHRISTIANO Location: temporal region  -CHRISTIANO Primary Wound Type: Incision  -CHRISTIANO    Row Name             Wound 04/12/23 1622 Right scalp Incision    Wound - Properties Group Placement Date: 04/12/23  -SAFIA Placement Time: 1622  -SAFIA Side: Right  -SAFIA Location: scalp  -SAFIA Primary Wound Type: Incision  -SAFIA    Retired Wound - Properties Group Placement Date: 04/12/23  -SAFIA Placement Time: 1622  -SAFIA Side: Right  -SAFIA Location: scalp  -SAFIA Primary Wound Type: Incision  -SAFIA    Retired Wound - Properties Group Date first assessed: 04/12/23  -SAFIA Time first assessed: 1622  -SAFIA Side: Right  -SAFIA Location: scalp  -SAFIA Primary Wound Type: Incision  -SAFIA    Row Name             Wound 04/25/23 1202 Left upper abdomen Puncture    Wound - Properties Group Placement Date: 04/25/23  -DT Placement Time: 1202  -DT Present on Hospital Admission: N  -DT Side: Left  -DT Orientation: upper  -DT Location: abdomen  -DT Primary Wound Type: Puncture  -DT    Retired Wound - Properties Group Placement Date: 04/25/23  -DT Placement Time: 1202  -DT Present on Hospital Admission: N  -DT Side: Left  -DT Orientation: upper  -DT Location: abdomen  -DT Primary Wound Type: Puncture  -DT    Retired Wound - Properties Group Date first assessed: 04/25/23  -DT Time first assessed: 1202  -DT Present on Hospital Admission: N  -DT Side: Left  -DT Location: abdomen  -DT Primary Wound Type: Puncture  -DT    Row Name             Wound 04/25/23 1240 anterior neck Incision    Wound - Properties Group Placement Date: 04/25/23  -DT Placement Time: 1240  -DT Present on Hospital Admission: N  -DT Orientation: anterior  -DT Location: neck  -DT Primary Wound Type: Incision  -DT    Retired Wound - Properties Group Placement Date: 04/25/23  -DT Placement Time: 1240  -DT Present on  Hospital Admission: N  -DT Orientation: anterior  -DT Location: neck  -DT Primary Wound Type: Incision  -DT    Retired Wound - Properties Group Date first assessed: 04/25/23  -DT Time first assessed: 1240  -DT Present on Hospital Admission: N  -DT Location: neck  -DT Primary Wound Type: Incision  -DT    Row Name 04/28/23 0950          Positioning and Restraints    Pre-Treatment Position in bed  -     Post Treatment Position bed  -JUAN     In Bed side lying left;call light within reach;side rails up x3;RUE elevated;LUE elevated;SCD pump applied;pillow between legs;heels elevated;with nsg  -JUAN           User Key  (r) = Recorded By, (t) = Taken By, (c) = Cosigned By    Initials Name Provider Type    Carrie Clark, [place]LAH Physical Therapist Assistant    Kierra Conroy, RN Registered Nurse    Ricky George RN Registered Nurse    Eron Garduno RN Registered Nurse                Physical Therapy Education     Title: PT OT SLP Therapies (In Progress)     Topic: Physical Therapy (In Progress)     Point: Mobility training (In Progress)     Learning Progress Summary           Patient Acceptance, E,D, NL by JUAN at 4/28/2023 1051    Comment: Benefits of ROM. Patient is non responsive    Acceptance, E,TB, VU by CR at 4/28/2023 0201    Acceptance, E,D, NL by JUAN at 4/27/2023 1034    Comment: Benefits of ROM.    Acceptance, E,D, NL by JUAN at 4/26/2023 0950    Comment: Benefits of ROM. Patient is sedated    Acceptance, E,TB, VU by CR at 4/25/2023 0209    Acceptance, E,TB, VU by CR at 4/24/2023 0041    Acceptance, E, NR by JUAN at 4/11/2023 0809    Comment: Looking ahead during gait    Acceptance, E,D, DU,VU by JUAN at 4/10/2023 1447    Comment: Safety with transfers, please take time, no impulsiveness    Acceptance, E,TB, VU by CM at 4/6/2023 1350    Acceptance, E, VU by SB at 4/5/2023 1605    Comment: helmet fit, safety, POC    Acceptance, E, VU by CHRISTIANO at 4/3/2023 1353    Comment: gait, safety,    Acceptance, E, VU by  SB at 4/1/2023 1405    Comment: pt edu on POC, benefits of act, PLB and d/c plans   Family Acceptance, E,TB, VU by CR at 4/28/2023 0201    Acceptance, E,TB, VU by CR at 4/25/2023 0209    Acceptance, E,TB, VU by CR at 4/24/2023 0041    Acceptance, E,TB, VU by CM at 4/6/2023 1350                   Point: Home exercise program (Done)     Learning Progress Summary           Patient Acceptance, E,TB, VU by CR at 4/28/2023 0201    Acceptance, E,TB, VU by CR at 4/25/2023 0209    Acceptance, E,TB, VU by CR at 4/24/2023 0041    Acceptance, E, NR by JESSICA at 4/19/2023 0815    Comment: progression of PT    Acceptance, E,TB, VU by CM at 4/6/2023 1350   Family Acceptance, E,TB, VU by CR at 4/28/2023 0201    Acceptance, E,TB, VU by CR at 4/25/2023 0209    Acceptance, E,TB, VU by CR at 4/24/2023 0041    Acceptance, E,TB, VU by CM at 4/6/2023 1350                   Point: Body mechanics (Done)     Learning Progress Summary           Patient Acceptance, E,TB, VU by CR at 4/28/2023 0201    Acceptance, E,TB, VU by CR at 4/25/2023 0209    Acceptance, E,TB, VU by CR at 4/24/2023 0041    Acceptance, E,TB, VU by CM at 4/6/2023 1350   Family Acceptance, E,TB, VU by CR at 4/28/2023 0201    Acceptance, E,TB, VU by CR at 4/25/2023 0209    Acceptance, E,TB, VU by CR at 4/24/2023 0041    Acceptance, E,TB, VU by CM at 4/6/2023 1350                   Point: Precautions (Done)     Learning Progress Summary           Patient Acceptance, E,TB, VU by CR at 4/28/2023 0201    Acceptance, E,TB, VU by CR at 4/25/2023 0209    Acceptance, E,TB, VU by CR at 4/24/2023 0041    Acceptance, E,TB, VU by CM at 4/6/2023 1350    Acceptance, E, VU by SB at 4/5/2023 1605    Comment: helmet fit, safety, POC    Acceptance, E, VU by SB at 4/1/2023 1405    Comment: pt edu on POC, benefits of act, PLB and d/c plans   Family Acceptance, E,TB, VU by CR at 4/28/2023 0201    Acceptance, E,TB, VU by CR at 4/25/2023 0209    Acceptance, E,TB, VU by CR at 4/24/2023 0041     Acceptance, E,TB, VU by CM at 4/6/2023 1350                               User Key     Initials Effective Dates Name Provider Type Discipline    JESSICA 02/03/23 -  Bruce Pillai, PT DPT Physical Therapist PT    JUAN 02/03/23 -  Carrie Callaway, [place]LA[place] Physical Therapist Assistant PT    CHRISTIANO 02/03/23 -  Александр Saravia, [place]LA[place] Physical Therapist Assistant PT    CM 03/02/23 -  Dianelys Carlton, RN Registered Nurse Nurse    SB 06/16/21 -  Erika Javier PT DPT Physical Therapist PT    CR 03/03/23 -  Janice Singh, RN Registered Nurse Nurse              PT Recommendation and Plan         Outcome Measures     Row Name 04/28/23 1000 04/27/23 1000 04/26/23 0900       How much help from another person do you currently need...    Turning from your back to your side while in flat bed without using bedrails? 1  -JUAN 1  -JUAN 1  -JUAN    Moving from lying on back to sitting on the side of a flat bed without bedrails? 1  -JUAN 1  -JUAN 1  -JUAN    Moving to and from a bed to a chair (including a wheelchair)? 1  -JUAN 1  -JUAN 1  -JUAN    Standing up from a chair using your arms (e.g., wheelchair, bedside chair)? 1  -JUAN 1  -JUAN 1  -JUAN    Climbing 3-5 steps with a railing? 1  -JUNA 1  -JUAN 1  -JUAN    To walk in hospital room? 1  -JUAN 1  -JUAN 1  -JUAN    AM-PAC 6 Clicks Score (PT) 6  -JUAN 6  -JUAN 6  -JUNA          User Key  (r) = Recorded By, (t) = Taken By, (c) = Cosigned By    Initials Name Provider Type    JUAN Carrie Callaway PTA Physical Therapist Assistant                 Time Calculation:    PT Charges     Row Name 04/28/23 1052             Time Calculation    Start Time 0950  -JUAN      Stop Time 1014  -JUAN      Time Calculation (min) 24 min  -JUAN         Timed Charges    31140 - PT Therapeutic Exercise Minutes 24  -JUAN         Total Minutes    Timed Charges Total Minutes 24  -JUAN       Total Minutes 24  -JUAN            User Key  (r) = Recorded By, (t) = Taken By, (c) = Cosigned By    Initials Name Provider Type    Carrie Clark PTA Physical Therapist  Assistant              Therapy Charges for Today     Code Description Service Date Service Provider Modifiers Qty    62323238529 HC PT THER PROC EA 15 MIN 4/27/2023 Carrie Callaway, PTA GP 2    53193879340 HC PT THER PROC EA 15 MIN 4/28/2023 Carrie Callaway, PTA GP 2          PT G-Codes  Outcome Measure Options: AM-PAC 6 Clicks Basic Mobility (PT)  AM-PAC 6 Clicks Score (PT): 6  AM-PAC 6 Clicks Score (OT): 20    Carrie Callaway, [place]LAH  4/28/2023

## 2023-04-29 NOTE — PLAN OF CARE
Goal Outcome Evaluation:  Plan of Care Reviewed With: patient        Progress: no change  Outcome Evaluation: Performed PROM to BUE and BLE

## 2023-04-29 NOTE — PROGRESS NOTES
Neurosurgery Daily Progress Note    HPI:  Elijah Escobar is a 67 y.o. male with a significant medical history of hypertension, diabetes, hyperlipidemia, seizures, craniotomy for tumor (6/16/2022), craniotomy for washout (7/1/2022), craniectomy (10/4/2022), coronary artery disease, diabetes, erectile dysfunction, GERD, hypertension, hyperlipidemia, tobacco abuse, and obesity.  He presents today with a complaint of a 4-5 days onset of progressively worsening right frontal, temporal, and periorbital edema and associated symptoms to include, but not limited to generalized fatigue, chills, dyspnea, intermittent nausea without vomiting, and states he's felt feverish.  Physical exam findings of neurologically intact with right frontal, temporal, and periorbital swelling.  WBC elevated at 15.  3 to an CRP elevated at 14.75.  Imaging pending.    Assessment:   Past Medical History:   Diagnosis Date   • Brain tumor    • Coronary artery disease    • COVID-19 vaccine series completed     MODERNA X 3; LAST DOSE 3/2022   • Diabetes    • Erectile dysfunction    • GERD (gastroesophageal reflux disease)    • Hypercholesteremia    • Hypertension    • Seizure      Active Hospital Problems    Diagnosis    • **Swelling of scalp    • Central apnea    • Elevated liver function tests    • COPD (chronic obstructive pulmonary disease)    • Obesity (BMI 30-39.9)    • Tobacco abuse, in remission    • Acute systolic heart failure    • ADDIE (obstructive sleep apnea)    • Type 2 myocardial infarction due to arrhythmia    • Acute pulmonary edema due to A-fib RVR    • Acute respiratory failure with hypoxia    • Intracranial epidural hematoma    • Post-operative infection    • Paroxysmal atrial fibrillation with rapid ventricular response    • Type 2 diabetes mellitus with hyperglycemia and peripheral neuropathy, with long-term current use of insulin (HCC)    • Coronary artery disease    • Meningioma s/p craniotomy      Plan:   Neuro: No change.   Does not open eyes to voice.  Does not follow commands.  Minimal motor response to painful stimuli.      Infected cranial flap      POD #23 (4/4/2023)  craniectomy and washout    Postop code CT and MRI stable. No acute abnormalities.    Flap tapped I&D cultures 4+ gram-negative bacilli    Heavy 4+ Serratia marcescens     POD #16 (4/13/2023) evacuation of scalp hematoma    JUAN removed     Continue neuro exams per policy.  Call for decline  EEG shows no epileptiform activity or ongoing seizures    4 Days Post-Op tracheostomy and PEG tube placement.    Appreciate ENT and general surgery    CV: Code for PEA, 4/16/2023.  Required epinephrine and bicarb   AFib/flutter, rate controlled.   Hold all anticoagulation for 2 weeks.   No evidence of DVT or PE per imaging   Appreciate cardiology  Pulm: Ventilation per trach   Increased pulmonary edema per CXR.    : DC Jeong catheter  FEN: Enteral feeding per PEG tube  Endocrine: Continue SSI  GI: SHERIF.  +BM  ID:  Infected cranial flap  CSF: no growth to date   PNA.  Resolved   UTI.  Resolved   Continue antibiotics.  Remains on Levaquin   Heme:  DVT prophylaxis with SCDs.  Hold all anticoagulation for 2 weeks.  Pain: No complaints at present  Dispo: PT/OT   Palliative care versus LTAC.  Decision pending conversation with family.     Chief complaint:   4-5 days onset of progressively worsening right frontal, temporal, and periorbital edema and associated symptoms to include, but not limited to generalized fatigue, chills, dyspnea, intermittent nausea without vomiting, and states he's felt feverish.    HPI  Subjective:  Symptoms:  Stable.      Temp:  [98.5 °F (36.9 °C)-98.8 °F (37.1 °C)] 98.5 °F (36.9 °C)  Heart Rate:  [] 105  Resp:  [16-32] 32  BP: ()/() 142/75  FiO2 (%):  [80 %] 80 %    Output by Drain (mL) 04/28/23 0701 - 04/28/23 1900 04/28/23 1901 - 04/29/23 0700 04/29/23 0701 - 04/29/23 1039 Range Total   Requested LDAs do not have output data documented.  "    Objective:  Vital signs: (most recent): Blood pressure 142/75, pulse 105, temperature 98.5 °F (36.9 °C), temperature source Axillary, resp. rate (!) 32, height 177.8 cm (70\"), weight 126 kg (278 lb 3.2 oz), SpO2 98 %.      Neurologic Exam     Mental Status     Does not open eyes to voice.  Does not follow commands.       Cranial Nerves     CN III, IV, VI   Pupils are equal, round, and reactive to light.    CN VII   Right facial weakness: none  Left facial weakness: none    CN IX, X   Right gag reflex: normal  Left gag reflex: normal    Motor Exam   Minimal motor response to pain to painful stimuli.       Drains: * No LDAs found *    Imaging Results (Last 24 Hours)     Procedure Component Value Units Date/Time    XR Chest 1 View [[id number]] Collected: 04/29/23 0905     Updated: 04/29/23 0910    Narrative:      EXAMINATION: Chest 1 view 04/29/2023     HISTORY: Intubated and sedated.     FINDINGS: Today's exam is compared to previous study one day earlier. A  tracheostomy tube remains in place as well as a right IJ deep line with  the tip at the cavoatrial juncture. There is persistent mixed  interstitial and alveolar disease within both lungs either related to  diffuse pneumonia or pulmonary edema. Mild interval improvement in the  right lung from the previous exam. Small effusions are present blunting  the costophrenic angles.       Impression:      1.. Persistent diffuse airspace opacities. Mild improvement in the right  lung from the previous exam.  2. No interval line changes.  This report was finalized on 04/29/2023 09:07 by Dr. Zack Mendoza MD.        Lab Results (last 24 hours)     Procedure Component Value Units Date/Time    Vancomycin, Trough Please draw 60 minutes prior to 12 AM dose. [[id number]] Collected: 04/29/23 1024    Specimen: Blood Updated: 04/29/23 1028    Comprehensive Metabolic Panel [[id number]]  (Abnormal) Collected: 04/29/23 0642    Specimen: Blood Updated: 04/29/23 0717     Glucose 194 " mg/dL      BUN 44 mg/dL      Creatinine 1.36 mg/dL      Sodium 135 mmol/L      Potassium 3.9 mmol/L      Comment: Slight hemolysis detected by analyzer. Results may be affected.        Chloride 94 mmol/L      CO2 27.0 mmol/L      Calcium 8.1 mg/dL      Total Protein 6.0 g/dL      Albumin 2.4 g/dL      ALT (SGPT) 12 U/L      AST (SGOT) 27 U/L      Alkaline Phosphatase 110 U/L      Total Bilirubin 0.6 mg/dL      Globulin 3.6 gm/dL      A/G Ratio 0.7 g/dL      BUN/Creatinine Ratio 32.4     Anion Gap 14.0 mmol/L      eGFR 57.0 mL/min/1.73     Narrative:      GFR Normal >60  Chronic Kidney Disease <60  Kidney Failure <15      POC Glucose Once [[id number]]  (Abnormal) Collected: 04/29/23 0522    Specimen: Blood Updated: 04/29/23 0533     Glucose 203 mg/dL      Comment: : [id number] [place] WhitneyMeter ID: [id number]       Blood Gas, Arterial - [[id number]]  (Abnormal) Collected: 04/29/23 0359    Specimen: Arterial Blood Updated: 04/29/23 0359     Site Left Radial     Marek's Test Positive     pH, Arterial 7.495 pH units      Comment: 83 Value above reference range        pCO2, Arterial 47.7 mm Hg      Comment: 83 Value above reference range        pO2, Arterial 106.0 mm Hg      HCO3, Arterial 36.7 mmol/L      Comment: 83 Value above reference range        Base Excess, Arterial 12.1 mmol/L      Comment: 83 Value above reference range        O2 Saturation, Arterial 99.1 %      Comment: 83 Value above reference range        Temperature 37.0 C      Barometric Pressure for Blood Gas 744 mmHg      Modality Ventilator     FIO2 80 %      Ventilator Mode AC     Set Tidal Volume 500     Set [facility] Resp Rate 12.0     PEEP 10.0     Collected by [id number]     Comment: Meter: [id number]     :  [id number]        pCO2, Temperature Corrected 47.7 mm Hg      pH, Temp Corrected 7.495 pH Units      pO2, Temperature Corrected 106 mm Hg     Blood Culture - Blood, Arm, Right [[id number]]  (Normal) Collected: 04/28/23 0130    Specimen:  Blood from Arm, Right Updated: 04/29/23 0200     Blood Culture No growth at 24 hours    Blood Culture - Blood, Arm, Right [[id number]]  (Normal) Collected: 04/28/23 0100    Specimen: Blood from Arm, Right Updated: 04/29/23 0200     Blood Culture No growth at 24 hours    POC Glucose Once [[id number]]  (Abnormal) Collected: 04/28/23 2351    Specimen: Blood Updated: 04/29/23 0002     Glucose 183 mg/dL      Comment: : [id number] Raúl Fariaeter ID: [id number]       POC Glucose Once [[id number]]  (Abnormal) Collected: 04/28/23 1744    Specimen: Blood Updated: 04/28/23 1759     Glucose 140 mg/dL      Comment: : [id number] Garry Skinner ID: [id number]       AFB Culture - Cerebrospinal Fluid, Lumbar Puncture [[id number]] Collected: 03/31/23 1450    Specimen: Cerebrospinal Fluid from Lumbar Puncture Updated: 04/28/23 1531     AFB Culture No AFB isolated at 4 weeks     AFB Stain No acid fast bacilli seen on direct smear    POC Glucose Once [[id number]]  (Abnormal) Collected: 04/28/23 1148    Specimen: Blood Updated: 04/28/23 1200     Glucose 161 mg/dL      Comment: : [id number] [place] Skinner ID: [id number]           ADITYA Cespedes

## 2023-04-29 NOTE — SIGNIFICANT NOTE
04/29/23 0647   Readings   PEEP Intrinsic (cm H2O) 9.8 cm H2O   Plateau Pressure (cm H2O) 22 cm H2O   Driving Pressure (cm H2O) 12.1 cm H2O   Static Compliance (L/cm H2O) 11   Dynamic Compliance (L/cm H2O) 73 L/cm H2O

## 2023-04-29 NOTE — PLAN OF CARE
Goal Outcome Evaluation:              Outcome Evaluation: Temperature high of 100.8, ID called and orders placed. MRSA (+) nares. Turned q2. Safety maintained.

## 2023-04-29 NOTE — SIGNIFICANT NOTE
04/29/23 0647   Readiness to Wean Daily Screen   Daily Screen Complete Y   Daily Screen Outcome fail     Criteria not med peep of 10 FIO2 80%

## 2023-04-29 NOTE — PLAN OF CARE
Problem: Inability to Wean (Mechanical Ventilation, Invasive)  Goal: Mechanical Ventilation Liberation  Outcome: Ongoing, Not Progressing   Goal Outcome Evaluation:

## 2023-04-29 NOTE — PROGRESS NOTES
[facility] [place] PULMONARY AND CRITICAL CARE PROGRESS NOTE - [facility]    Patient: Elijah Escobar    1955    MR# [id number]    Acct# [id number]  04/29/23   17:53 CDT  Referring Provider: Flaco Portillo, *    Chief Complaint: Mechanically ventilated/acute respiratory failure    Interval history:     Patient remains to be on mechanical ventilation.  He is not responsive.  He is currently maintained on assist-control 12 tidal volume 500 FiO2 is 70% PEEP +10.  Patient continues to have air leak around the tracheostomy.  Even though that he is on 8.5 trach tube.  Exhaled tidal volumes were noted to be around 300.  Diuresing this patient has been conducted.  Satisfactory urine output.    Meds:  aspirin, 81 mg, Oral, Daily  atorvastatin, 20 mg, Oral, Nightly  budesonide, 0.5 mg, Nebulization, BID - RT  Chlorhexidine Gluconate Cloth, 1 application, Topical, Q24H  digoxin, 250 mcg, Oral, Daily  dilTIAZem, 90 mg, Oral, Q8H  furosemide, 40 mg, Intravenous, BID  gabapentin, 300 mg, Oral, Q8H  insulin detemir, 25 Units, Subcutaneous, Q12H  insulin lispro, 0-14 Units, Subcutaneous, Q6H  ipratropium, 0.5 mg, Nebulization, 4x Daily - RT  levETIRAcetam, 500 mg, Oral, BID  levoFLOXacin, 500 mg, Oral, Q24H  methylnaltrexone, 12 mg, Subcutaneous, Every Other Day  metoclopramide, 10 mg, Oral, 4x Daily AC & at Bedtime  metoprolol tartrate, 50 mg, Oral, Q12H  multivitamin with minerals, 1 tablet, Oral, Daily  pantoprazole, 40 mg, Intravenous, Q AM  piperacillin-tazobactam, 3.375 g, Intravenous, Q8H  polyethylene glycol, 17 g, Oral, Daily  sennosides-docusate, 1 tablet, Oral, Daily  sodium chloride, 10 mL, Intravenous, Q12H  sucralfate, 1 g, Oral, 4x Daily AC & at Bedtime  vancomycin, 750 mg, Intravenous, Q12H      propofol, 5-50 mcg/kg/min, Last Rate: 20 mcg/kg/min (04/29/23 1249)        Ventilator Settings:        Resp Rate (Set): 12 tidal volume 500 FiO2 40%.  FiO2 (%): (S) 50 %  PEEP/CPAP (cm H2O): (S) 8 cm  H20  Minute Ventilation (L/min) (Obs): 12 L/min  Resp Rate (Observed) Vent: 27  I:E Ratio (Set): 1:4.45  I:E Ratio (Obs): 1.1:1  PIP Observed (cm H2O): 23 cm H2O  RSBI: 0  Physical Exam:  Temp:  [98.5 °F (36.9 °C)-100.8 °F (38.2 °C)] 100.8 °F (38.2 °C)  Heart Rate:  [] 110  Resp:  [17-32] 24  BP: ()/(59-88) 132/73  FiO2 (%):  [50 %-80 %] 50 %    Intake/Output Summary (Last 24 hours) at 4/29/2023 1753  Last data filed at 4/29/2023 1617  Gross per 24 hour   Intake 2131.54 ml   Output 1295 ml   Net 836.54 ml     SpO2 Percentage    04/29/23 1500 04/29/23 1530 04/29/23 1600   SpO2: 91% 90% 91%   Body mass index is 39.92 kg/m².     Constitutional    Patient remains on mechanical relation.  AC 12.500 FiO2 80% PEEP +10.  The patient appears to be irritable when we approach him.  Other than that the patient is not purposefully following any commands.    HENT:      Head: Normocephalic. Right scalp wound, well-healed, no redness warmth or drainage     Nose: Nose normal.      Mouth/Throat: Tracheostomy in place a 8.5 Shiley.  However patient still has some leaking despite the cough is fully inflated.    Eyes:      General:         Right eye: No discharge.         Left eye: No discharge.   Cardiovascular:      Rate and Rhythm: tachycardia   Pulmonary:      Effort: Tachypnea was present.     Breath sounds: Bilateral entry was present however diminished on the basis sounds.  Few rhonchi noted to be scattered.    Abdominal:      General: Abdomen is flat.      Palpations: Abdomen is soft.    Musculoskeletal:      General: Bilateral wrist restraints, SCDs      Right lower leg: Edema      Left lower leg: Edema   Skin:     General: Skin is warm and dry.      Coloration: Skin is not jaundiced or pale.   Neurological:      General: On tracheostomy.             Physician substantive portion: medical decision making  Result Review  Laboratory Data:  Results from last 7 days   Lab Units 04/23/23  2354   WBC 10*3/mm3 10.38    HEMOGLOBIN g/dL 10.0*   PLATELETS 10*3/mm3 245     Results from last 7 days   Lab Units 04/29/23  0642 04/28/23  0014 04/27/23  0223   SODIUM mmol/L 135* 138 141   POTASSIUM mmol/L 3.9 4.2 3.5   CO2 mmol/L 27.0 30.0* 33.0*   BUN mg/dL 44* 29* 29*   CREATININE mg/dL 1.36* 1.25 1.07   LACTATE mmol/L  --  2.3*  --    CRP mg/dL  --  7.05*  --      Results from last 7 days   Lab Units 04/29/23  0359 04/28/23  0421 04/27/23  0407   PH, ARTERIAL pH units 7.495* 7.455* 7.521*   PCO2, ARTERIAL mm Hg 47.7* 51.0* 43.8   PO2 ART mm Hg 106.0 88.8 96.5   FIO2 % 80 100 40     Microbiology Results (last 10 days)     Procedure Component Value - Date/Time    Blood Culture - Blood, Arm, Right [[id number]]  (Normal) Collected: 04/28/23 0130    Lab Status: Preliminary result Specimen: Blood from Arm, Right Updated: 04/29/23 0200     Blood Culture No growth at 24 hours    Blood Culture - Blood, Arm, Right [[id number]]  (Normal) Collected: 04/28/23 0100    Lab Status: Preliminary result Specimen: Blood from Arm, Right Updated: 04/29/23 0200     Blood Culture No growth at 24 hours         Recent films:  XR Chest 1 View    Result Date: 4/29/2023  EXAMINATION: Chest 1 view 04/29/2023  HISTORY: Intubated and sedated.  FINDINGS: Today's exam is compared to previous study one day earlier. A tracheostomy tube remains in place as well as a right IJ deep line with the tip at the cavoatrial juncture. There is persistent mixed interstitial and alveolar disease within both lungs either related to diffuse pneumonia or pulmonary edema. Mild interval improvement in the right lung from the previous exam. Small effusions are present blunting the costophrenic angles.      Impression: 1.. Persistent diffuse airspace opacities. Mild improvement in the right lung from the previous exam. 2. No interval line changes. This report was finalized on 04/29/2023 09:07 by Dr. Zack Mendoza MD.    XR Chest 1 View    Result Date: 4/28/2023  HISTORY: Intubated and  sedated  CXR: A frontal view the chest obtained  COMPARISON: 04/27/2023  FINDINGS: Tracheostomy tube remains appropriate in position. A right IJ central line tip projects near the atrial caval junction. Similar enlargement of the cardiac silhouette with diffuse bilateral pulmonary opacities. Questionable trace pleural effusions with no pneumothorax.      Impression: 1. Stable 1 day view the chest. Persistent diffuse bilateral pulmonary opacities suggestive for pulmonary edema and/or multifocal pneumonia. This report was finalized on 04/28/2023 07:21 by Dr. Coleen Terry MD.    XR Chest 1 View    Result Date: 4/28/2023  EXAMINATION: XR CHEST 1 VW- 4/28/2023 7:09 AM CDT  HISTORY: S06.4X0A-Epidural hemorrhage without loss of consciousness, initial encounter; Z74.09-Other reduced mobility; Z98.890-Other specified postprocedural states; T81.42XA-Infection following a procedure, deep incisional surgical site, initial encounter; 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  COMPARISON: Chest x-ray 4/27/2023 0334 hours.  The tracheostomy tube and right internal jugular central line appear in satisfactory position as before. There is interval increase in perihilar infiltrates and vascular congestion, greater on the left, likely related to pulmonary edema. Pneumonia would be less likely given the relatively rapid change. No pneumothorax or pleural effusion is identified. Heart size appears normal. The osseous structures are unremarkable.      Impression: Stable satisfactory position of the right IJ central line and tracheostomy tube. Increasing central infiltrates most likely related to pulmonary edema. This is somewhat asymmetric and greater on the left. This report was finalized on 04/28/2023 07:11 by Dr. Elijah Grover MD.     Personal review of imaging: CXR shows Reviewed current imaging study.  It showed increased  interstitial opacities and vascular congestion likely from pulm edema.  No lung consolidation.  Tubes and lines are in position.      Pulmonary Assessment:  1. Acute on chronic hypoxic respiratory failure on mechanical ventilation/s/p tracheostomy PEG tube.  2. Chronic congestive diastolic heart failure  3. Atrial fibrillation  4. COPD with history of tobacco abuse  5. Meningioma s/p craniotomy, cranioplasty and flap infection.    6. Encephalopathy/patient not showing signs of CNS responsiveness.  7. Hypertension  8. Diabetes mellitus insulin-dependent  9. Coronary artery disease  10. History obstructive sleep apnea syndrome.  11. Seizure disorder  12. Chronic pain    Recommend/plan:   · Yesterday the patient had to be given an extra dose of Lasix around midnight.  · No weaning will be attempted today.    · This is believed to be due to the atelectasis.  Chest x-ray to be followed in AM..  · We will maintain the patient on assist-control mode.    · Currently the treatment and management of this patient will be coordinated with the other attendings the case.  ID is already on follow-up on this patient  · Continue antimicrobial coverage Vanco/Levaquin/Zosyn.  · Chest x-ray from today appears to be showing improvement of the lung infiltrates.  13. .Tracheostomy status.  This patient has been air leak around the cuff.  Apparently ENT consult on the case.  The air leak could not be corrected due to the continuous air leak.  We will go ahead and increase the tidal volume to 550 and try to compensate for the air leak.  We will depend on the exhaled tidal volumes to help us guide diet.      Patient has been seen by evaluated by Dr. Arreola.  Full plan of management and impression was formulated by myself.  Critical care time provided to the patient is estimated to be 50 minutes.    Electronically signed by               Espinoza Arreola MD,  Pulmonologist/Intensivist   4/29/2023, 17:53 CDT

## 2023-04-29 NOTE — THERAPY TREATMENT NOTE
Acute Care - Physical Therapy Treatment Note  [facility]     Patient Name: Elijah Escobar  : 1955  MRN: [id number]  Today's Date: 2023      Visit Dx:     ICD-10-CM ICD-9-CM   1. Intracranial epidural hematoma  S06.4X0A 852.40   2. Impaired mobility  Z74.09 799.89   3. S/P craniotomy  Z98.890 V45.89   4. Infection of deep incisional surgical site after procedure, initial encounter  T81.42XA 998.59   5. Meningioma s/p craniotomy  D32.9 225.2   6. Type 2 diabetes mellitus with hyperglycemia and peripheral neuropathy, with long-term current use of insulin (HCC)  E11.65 250.00    Z79.4 790.29     V58.67   7. Paroxysmal atrial fibrillation with rapid ventricular response  I48.0 427.31   8. Swelling of scalp  R22.0 784.2   9. Acute pulmonary edema due to A-fib RVR  J81.0 518.4   10. Acute respiratory failure with hypoxia  J96.01 518.81   11. Type 2 myocardial infarction due to arrhythmia  I49.9 427.9    I21.A1 410.90   12. Obesity (BMI 30-39.9)  E66.9 278.00   13. Tobacco abuse, in remission  F17.201 305.1   14. Acute systolic heart failure  I50.21 428.21   15. ADDIE (obstructive sleep apnea)  G47.33 327.23   16. Secondary hypertension  I15.9 405.99   17. Gastroesophageal reflux disease, unspecified whether esophagitis present  K21.9 530.81   18. Class 2 severe obesity due to excess calories with serious comorbidity and body mass index (BMI) of 38.0 to 38.9 in adult  E66.01 278.01    Z68.38 V85.38   19. Leukocytosis, unspecified type  D72.829 288.60   20. Anemia due to other cause, not classified  D64.89 285.8   21. Smoker  F17.200 305.1   22. History of cranioplasty  Z98.890 V45.89   23. Facial edema  R60.0 782.3   24. Coronary artery disease due to lipid rich plaque  I25.10 414.00    I25.83 414.3   25. Postoperative infection, unspecified type, initial encounter  T81.40XA 998.59   26. Chronic obstructive pulmonary disease, unspecified COPD type  J44.9 496   27. Respiratory insufficiency  R06.89 786.09      Patient Active Problem List   Diagnosis   • Meningioma s/p craniotomy   • Hypertension   • GERD (gastroesophageal reflux disease)   • Coronary artery disease   • Class 2 severe obesity due to excess calories with serious comorbidity and body mass index (BMI) of 38.0 to 38.9 in adult   • Type 2 diabetes mellitus with hyperglycemia and peripheral neuropathy, with long-term current use of insulin (HCC)   • Leukocytosis   • Other specified anemias   • Paroxysmal atrial fibrillation with rapid ventricular response   • Post-operative infection   • Smoker   • History of cranioplasty   • Facial edema   • Swelling of scalp   • Acute pulmonary edema due to A-fib RVR   • Acute respiratory failure with hypoxia   • Type 2 myocardial infarction due to arrhythmia   • COPD (chronic obstructive pulmonary disease)   • Obesity (BMI 30-39.9)   • Tobacco abuse, in remission   • Acute systolic heart failure   • ADDIE (obstructive sleep apnea)   • Intracranial epidural hematoma   • Elevated liver function tests   • Central apnea     Past Medical History:   Diagnosis Date   • Brain tumor    • Coronary artery disease    • COVID-19 vaccine series completed     MODERNA X 3; LAST DOSE 3/2022   • Diabetes    • Erectile dysfunction    • GERD (gastroesophageal reflux disease)    • Hypercholesteremia    • Hypertension    • Seizure      Past Surgical History:   Procedure Laterality Date   • BALLOON ANGIOPLASTY, ARTERY Right    • COLONOSCOPY     • CRANIECTOMY Right 7/1/2022    Procedure: CRANIECTOMY WITH INCISIONAL WASHOUT;  Surgeon: Felipe Banerjee MD;  Location:  [facility];  Service: Neurosurgery;  Laterality: Right;   • CRANIOPLASTY Right 10/4/2022    Procedure: CRANIOPLASTY right with Lumbar drain;  Surgeon: Falco Portillo MD;  Location:  [facility];  Service: Neurosurgery;  Laterality: Right;   • CRANIOPLASTY N/A 4/4/2023    Procedure: CRANIOPLASTY, removal, right, horseshoe;  Surgeon: Flaco Portillo MD;  Location:  [facility];   Service: Neurosurgery;  Laterality: N/A;   • CRANIOTOMY Right 4/12/2023    Procedure: CRANIOTOMY; evacuation of right hematoma;  Surgeon: Flaco Portillo MD;  Location:  [facility];  Service: Neurosurgery;  Laterality: Right;   • CRANIOTOMY FOR TUMOR Right 6/16/2022    Procedure: CRANIOTOMY FOR TUMOR STERIOTACTIC WITH BRAIN LAB, right;  Surgeon: Flaco Portillo MD;  Location:  [facility];  Service: Neurosurgery;  Laterality: Right;   • ENDOSCOPY WITH GASTROSTOMY TUBE INSERTION N/A 4/25/2023    Procedure: ESOPHAGOGASTRODUODENOSCOPY WITH GASTROSTOMY TUBE INSERTION;  Surgeon: Geeta Abdalla MD;  Location:  [facility];  Service: General;  Laterality: N/A;   • TRACHEOSTOMY N/A 4/25/2023    Procedure: TRACHEOSTOMY;  Surgeon: Wagner Villarreal MD;  Location:  [facility];  Service: ENT;  Laterality: N/A;     PT Assessment (last 12 hours)     PT Evaluation and Treatment     Row Name 04/29/23 0805          Physical Therapy Time and Intention    Document Type therapy note (daily note)  -CHRISTIANO     Mode of Treatment physical therapy  -CHRISTIANO     Row Name 04/29/23 0805          General Information    Existing Precautions/Restrictions fall  vent, helmet when OOB  -CHRISTIANO     Row Name 04/29/23 0805          Pain Scale: FACES Pre/Post-Treatment    Pain: FACES Scale, Pretreatment 0-->no hurt  -CHRISTIANO     Posttreatment Pain Rating 0-->no hurt  -CHRISTIANO     Row Name 04/29/23 0805          Motor Skills    Comments, Therapeutic Exercise PROM x 10 BUE, BLE  -CHRISTIANO     Row Name             Wound 04/04/23 1111 Right temporal region Incision    Wound - Properties Group Placement Date: 04/04/23  -JBA Placement Time: 1111  -JBA Side: Right  -JBA Location: temporal region  -JBA Primary Wound Type: Incision  -JBA    Retired Wound - Properties Group Placement Date: 04/04/23  -JBA Placement Time: 1111  -JBA Side: Right  -JBA Location: temporal region  -JBA Primary Wound Type: Incision  -JBA    Retired Wound - Properties Group Date first assessed:  04/04/23  -JBA Time first assessed: 1111  -JBA Side: Right  -JBA Location: temporal region  -JBA Primary Wound Type: Incision  -JBA    Row Name             Wound 04/12/23 1622 Right scalp Incision    Wound - Properties Group Placement Date: 04/12/23  -SAFIA Placement Time: 1622  -SAFIA Side: Right  -SAFIA Location: scalp  -SAFIA Primary Wound Type: Incision  -SAFIA    Retired Wound - Properties Group Placement Date: 04/12/23  -SAFIA Placement Time: 1622  -SAFIA Side: Right  -SAFIA Location: scalp  -SAFIA Primary Wound Type: Incision  -SAFIA    Retired Wound - Properties Group Date first assessed: 04/12/23  -SAFIA Time first assessed: 1622  -SAFIA Side: Right  -SAFIA Location: scalp  -SAFIA Primary Wound Type: Incision  -SAFIA    Row Name             Wound 04/25/23 1202 Left upper abdomen Puncture    Wound - Properties Group Placement Date: 04/25/23  -DT Placement Time: 1202  -DT Present on Hospital Admission: N  -DT Side: Left  -DT Orientation: upper  -DT Location: abdomen  -DT Primary Wound Type: Puncture  -DT    Retired Wound - Properties Group Placement Date: 04/25/23  -DT Placement Time: 1202  -DT Present on Hospital Admission: N  -DT Side: Left  -DT Orientation: upper  -DT Location: abdomen  -DT Primary Wound Type: Puncture  -DT    Retired Wound - Properties Group Date first assessed: 04/25/23  -DT Time first assessed: 1202  -DT Present on Hospital Admission: N  -DT Side: Left  -DT Location: abdomen  -DT Primary Wound Type: Puncture  -DT    Row Name             Wound 04/25/23 1240 anterior neck Incision    Wound - Properties Group Placement Date: 04/25/23  -DT Placement Time: 1240  -DT Present on Hospital Admission: N  -DT Orientation: anterior  -DT Location: neck  -DT Primary Wound Type: Incision  -DT    Retired Wound - Properties Group Placement Date: 04/25/23  -DT Placement Time: 1240  -DT Present on Hospital Admission: N  -DT Orientation: anterior  -DT Location: neck  -DT Primary Wound Type: Incision  -DT    Retired Wound - Properties Group Date first  assessed: 04/25/23  -DT Time first assessed: 1240  -DT Present on Hospital Admission: N  -DT Location: neck  -DT Primary Wound Type: Incision  -DT    Row Name 04/29/23 0805          Positioning and Restraints    Pre-Treatment Position in bed  -CHRISTIANO     Post Treatment Position bed  -CHRISTIANO     In Bed side lying right;patient within staff view;side rails up x3  -CHRISTIANO           User Key  (r) = Recorded By, (t) = Taken By, (c) = Cosigned By    Initials Name Provider Type    CHRISTIANO Александр Saravia PTA Physical Therapist Assistant    Kierra Conroy, RN Registered Nurse    iRcky Watts RN Registered Nurse    Eron Garduno RN Registered Nurse                Physical Therapy Education     Title: PT OT SLP Therapies (In Progress)     Topic: Physical Therapy (In Progress)     Point: Mobility training (In Progress)     Learning Progress Summary           Patient Acceptance, E, NL by CHRISTIANO at 4/29/2023 0805    Acceptance, E,D, NL by JUAN at 4/28/2023 1051    Comment: Benefits of ROM. Patient is non responsive    Acceptance, E,TB, VU by CR at 4/28/2023 0201    Acceptance, E,D, NL by JUAN at 4/27/2023 1034    Comment: Benefits of ROM.    Acceptance, E,D, NL by JUAN at 4/26/2023 0950    Comment: Benefits of ROM. Patient is sedated    Acceptance, E,TB, VU by CR at 4/25/2023 0209    Acceptance, E,TB, VU by CR at 4/24/2023 0041    Acceptance, E, NR by JUAN at 4/11/2023 0809    Comment: Looking ahead during gait    Acceptance, E,D, DU,VU by JUAN at 4/10/2023 1447    Comment: Safety with transfers, please take time, no impulsiveness    Acceptance, E,TB, VU by CM at 4/6/2023 1350    Acceptance, E, VU by SB at 4/5/2023 1605    Comment: helmet fit, safety, POC    Acceptance, E, VU by CHRISTIANO at 4/3/2023 1353    Comment: gait, safety,    Acceptance, E, VU by SB at 4/1/2023 1405    Comment: pt edu on POC, benefits of act, PLB and d/c plans   Family Acceptance, E,TB, VU by CR at 4/28/2023 0201    Acceptance, E,TB, VU by CR at 4/25/2023 0209     Acceptance, E,TB, VU by CR at 4/24/2023 0041    Acceptance, E,TB, VU by CM at 4/6/2023 1350                   Point: Home exercise program (Done)     Learning Progress Summary           Patient Acceptance, E,TB, VU by CR at 4/28/2023 0201    Acceptance, E,TB, VU by CR at 4/25/2023 0209    Acceptance, E,TB, VU by CR at 4/24/2023 0041    Acceptance, E, NR by JESSICA at 4/19/2023 0815    Comment: progression of PT    Acceptance, E,TB, VU by CM at 4/6/2023 1350   Family Acceptance, E,TB, VU by CR at 4/28/2023 0201    Acceptance, E,TB, VU by CR at 4/25/2023 0209    Acceptance, E,TB, VU by CR at 4/24/2023 0041    Acceptance, E,TB, VU by CM at 4/6/2023 1350                   Point: Body mechanics (Done)     Learning Progress Summary           Patient Acceptance, E,TB, VU by CR at 4/28/2023 0201    Acceptance, E,TB, VU by CR at 4/25/2023 0209    Acceptance, E,TB, VU by CR at 4/24/2023 0041    Acceptance, E,TB, VU by CM at 4/6/2023 1350   Family Acceptance, E,TB, VU by CR at 4/28/2023 0201    Acceptance, E,TB, VU by CR at 4/25/2023 0209    Acceptance, E,TB, VU by CR at 4/24/2023 0041    Acceptance, E,TB, VU by CM at 4/6/2023 1350                   Point: Precautions (Done)     Learning Progress Summary           Patient Acceptance, E,TB, VU by CR at 4/28/2023 0201    Acceptance, E,TB, VU by CR at 4/25/2023 0209    Acceptance, E,TB, VU by CR at 4/24/2023 0041    Acceptance, E,TB, VU by CM at 4/6/2023 1350    Acceptance, E, VU by SB at 4/5/2023 1605    Comment: helmet fit, safety, POC    Acceptance, E, VU by SB at 4/1/2023 1405    Comment: pt edu on POC, benefits of act, PLB and d/c plans   Family Acceptance, E,TB, VU by CR at 4/28/2023 0201    Acceptance, E,TB, VU by CR at 4/25/2023 0209    Acceptance, E,TB, VU by CR at 4/24/2023 0041    Acceptance, E,TB, VU by CM at 4/6/2023 1350                               User Key     Initials Effective Dates Name Provider Type Discipline    JESSICA 02/03/23 -  Bruce Pillai, PT DPT Physical  Therapist PT    JUAN 02/03/23 -  Carrie Callaway PTA Physical Therapist Assistant PT    CHRISTIANO 02/03/23 -  Александр Saravia PTA Physical Therapist Assistant PT    CM 03/02/23 -  Dianelys Carlton, RN Registered Nurse Nurse    SB 06/16/21 -  Erika Javier, PT DPT Physical Therapist PT    CR 03/03/23 -  Janice Singh, RN Registered Nurse Nurse              PT Recommendation and Plan     Plan of Care Reviewed With: patient  Progress: no change  Outcome Evaluation: Performed PROM to BUE and BLE   Outcome Measures     Row Name 04/29/23 0805 04/28/23 1000 04/27/23 1000       How much help from another person do you currently need...    Turning from your back to your side while in flat bed without using bedrails? 1  -CHRISTIANO 1  -JUAN 1  -JUAN    Moving from lying on back to sitting on the side of a flat bed without bedrails? 1  -CHRISTIANO 1  -JUAN 1  -JUAN    Moving to and from a bed to a chair (including a wheelchair)? 1  -CHRISTIANO 1  -JUAN 1  -JUAN    Standing up from a chair using your arms (e.g., wheelchair, bedside chair)? 1  -CHRISTIANO 1  -JUAN 1  -JUAN    Climbing 3-5 steps with a railing? 1  -CHRISTIANO 1  -JUAN 1  -JUAN    To walk in hospital room? 1  -CHRISTIANO 1  -JUAN 1  -JUAN    AM-PAC 6 Clicks Score (PT) 6  -CHRISTIANO 6  -JUAN 6  -JUAN       Functional Assessment    Outcome Measure Options AM-PAC 6 Clicks Basic Mobility (PT)  -CHRISTIANO -- --          User Key  (r) = Recorded By, (t) = Taken By, (c) = Cosigned By    Initials Name Provider Type    Carrie Clark, [place] Physical Therapist Assistant    Александр Morley, [place] Physical Therapist Assistant                 Time Calculation:    PT Charges     Row Name 04/29/23 0805             Time Calculation    Start Time 0805  -CHRISTIANO      Stop Time 0820  -CHRISTIANO      Time Calculation (min) 15 min  -CHRISTIANO      PT Received On 04/29/23  -CHRISTIANO         Time Calculation- PT    Total Timed Code Minutes- PT 15 minute(s)  -CHRISTIANO         Timed Charges    56539 - PT Therapeutic Exercise Minutes 15  -CHRISTIANO         Total Minutes    Timed Charges Total Minutes 15   -CHRISTIANO       Total Minutes 15  -CHRISTIANO            User Key  (r) = Recorded By, (t) = Taken By, (c) = Cosigned By    Initials Name Provider Type    Александр Morley PTA Physical Therapist Assistant              Therapy Charges for Today     Code Description Service Date Service Provider Modifiers Qty    25050217779 HC PT THER PROC EA 15 MIN 4/29/2023 Александр Saravia PTA GP 1          PT G-Codes  Outcome Measure Options: AM-PAC 6 Clicks Basic Mobility (PT)  AM-PAC 6 Clicks Score (PT): 6  AM-PAC 6 Clicks Score (OT): 20    Александр Saravia PTA  4/29/2023

## 2023-04-29 NOTE — PROGRESS NOTES
"Pharmacy Dosing Service  Pharmacokinetics  Vancomycin Follow-up Evaluation    Assessment/Action/Plan:  Current Order: Vancomycin 750 mg IVPB every 12 hours  Current end date: 5/4/23  Levels: Trough = 13.4   Additional antimicrobial agent(s): Zosyn / Levofloxacin     Vancomycin trough = 13.4 at 10:24 today (previous dose administered at 23:39 last PM). SCr=1.36 (increasing; see below). Patient remains on Zosyn & scheduled IV furosemide as well. No dose adjustment at this time; Pharmacy will continue to follow daily and adjust dose accordingly.     Subjective:  Elijah Escobar is a 67 y.o. male currently on Vancomycin 750 mg IV every 12 hours for the treatment of pneumonia, day 2 of 7 of treatment.    Objective:  Ht: 177.8 cm (70\"); Wt: 126 kg (278 lb 3.2 oz)  Estimated Creatinine Clearance: 70.2 mL/min (A) (by C-G formula based on SCr of 1.36 mg/dL (H)).   Creatinine   Date Value Ref Range Status   04/29/2023 1.36 (H) 0.76 - 1.27 mg/dL Final   04/28/2023 1.25 0.76 - 1.27 mg/dL Final   04/27/2023 1.07 0.76 - 1.27 mg/dL Final   08/31/2022 0.80 0.60 - 1.30 mg/dL Final     Comment:     Serial Number: [id number]:  [id number]   10/21/2020 1.03 0.60 - 1.30 mg/dL Final   02/07/2020 1.00 0.60 - 1.30 mg/dL Final   02/07/2020 1.00 0.60 - 1.30 mg/dL Final      Lab Results   Component Value Date    WBC 10.38 04/23/2023    WBC 13.61 (H) 04/19/2023    WBC 16.70 (H) 04/18/2023         Lab Results   Component Value Date    VANCOTROUGH 13.40 04/29/2023       Culture Results:  Microbiology Results (last 10 days)       Procedure Component Value - Date/Time    Blood Culture - Blood, Arm, Right [[id number]]  (Normal) Collected: 04/28/23 0130    Lab Status: Preliminary result Specimen: Blood from Arm, Right Updated: 04/29/23 0200     Blood Culture No growth at 24 hours    Blood Culture - Blood, Arm, Right [[id number]]  (Normal) Collected: 04/28/23 0100    Lab Status: Preliminary result Specimen: Blood from Arm, Right Updated: 04/29/23 0200 "     Blood Culture No growth at 24 hours            Faisal Grissom, PharmD   04/29/23 11:40 CDT

## 2023-04-30 NOTE — PROGRESS NOTES
Neurosurgery Daily Progress Note    HPI:  Elijah Escobar is a 67 y.o. male with a significant medical history of hypertension, diabetes, hyperlipidemia, seizures, craniotomy for tumor (6/16/2022), craniotomy for washout (7/1/2022), craniectomy (10/4/2022), coronary artery disease, diabetes, erectile dysfunction, GERD, hypertension, hyperlipidemia, tobacco abuse, and obesity.  He presents today with a complaint of a 4-5 days onset of progressively worsening right frontal, temporal, and periorbital edema and associated symptoms to include, but not limited to generalized fatigue, chills, dyspnea, intermittent nausea without vomiting, and states he's felt feverish.  Physical exam findings of neurologically intact with right frontal, temporal, and periorbital swelling.  WBC elevated at 15.  3 to an CRP elevated at 14.75.  Imaging pending.    Assessment:   Past Medical History:   Diagnosis Date   • Brain tumor    • Coronary artery disease    • COVID-19 vaccine series completed     MODERNA X 3; LAST DOSE 3/2022   • Diabetes    • Erectile dysfunction    • GERD (gastroesophageal reflux disease)    • Hypercholesteremia    • Hypertension    • Seizure      Active Hospital Problems    Diagnosis    • **Swelling of scalp    • Central apnea    • Elevated liver function tests    • COPD (chronic obstructive pulmonary disease)    • Obesity (BMI 30-39.9)    • Tobacco abuse, in remission    • Acute systolic heart failure    • ADDIE (obstructive sleep apnea)    • Type 2 myocardial infarction due to arrhythmia    • Acute pulmonary edema due to A-fib RVR    • Acute respiratory failure with hypoxia    • Intracranial epidural hematoma    • Post-operative infection    • Paroxysmal atrial fibrillation with rapid ventricular response    • Type 2 diabetes mellitus with hyperglycemia and peripheral neuropathy, with long-term current use of insulin (HCC)    • Coronary artery disease    • Meningioma s/p craniotomy      Plan:   Neuro: No change.   Does not open eyes to voice.  Does not follow commands.  Minimal motor response to painful stimuli.  + gag and cough.     Infected cranial flap      POD #24 (4/4/2023)  craniectomy and washout    Postop code CT and MRI stable. No acute abnormalities.    Flap tapped I&D cultures 4+ gram-negative bacilli    Heavy 4+ Serratia marcescens     POD #17 (4/13/2023) evacuation of scalp hematoma    JUAN removed     Continue neuro exams per policy.  Call for decline  EEG shows no epileptiform activity or ongoing seizures    5 Days Post-Op (4/25/2023) tracheostomy and PEG tube placement.    Appreciate ENT and general surgery    CV: Code for PEA, 4/16/2023.  Required epinephrine and bicarb   AFib/flutter, rate controlled.   Hold all anticoagulation for 2 weeks.   No evidence of DVT or PE per imaging   Appreciate cardiology  Pulm: Ventilation per trach   Increased pulmonary edema per CXR.    : DC Jeong catheter  FEN: Enteral feeding per PEG tube  Endocrine: Continue SSI  GI: SHERIF.  +BM  ID:  Infected cranial flap  CSF: no growth to date   PNA.  Resolved   UTI.  Resolved   Continue antibiotics.  Remains on Levaquin   Heme:  DVT prophylaxis with SCDs.  Hold all anticoagulation for 2 weeks.  Pain: No complaints at present  Dispo: PT/OT   Palliative care versus LTAC.  Decision pending conversation with family.     Chief complaint:   4-5 days onset of progressively worsening right frontal, temporal, and periorbital edema and associated symptoms to include, but not limited to generalized fatigue, chills, dyspnea, intermittent nausea without vomiting, and states he's felt feverish.    HPI  Subjective:  Symptoms:  Stable.      Temp:  [98.7 °F (37.1 °C)-100.8 °F (38.2 °C)] 99.8 °F (37.7 °C)  Heart Rate:  [] 78  Resp:  [24-30] 30  BP: ()/(59-84) 98/62  FiO2 (%):  [50 %-70 %] 70 %    Output by Drain (mL) 04/29/23 0701 - 04/29/23 1900 04/29/23 1901 - 04/30/23 0700 04/30/23 0701 - 04/30/23 1131 Range Total   Requested LDAs do not  "have output data documented.     Objective:  Vital signs: (most recent): Blood pressure 98/62, pulse 78, temperature 99.8 °F (37.7 °C), temperature source Oral, resp. rate (!) 30, height 177.8 cm (70\"), weight 130 kg (286 lb 6.4 oz), SpO2 98 %.      Neurologic Exam     Mental Status     Does not open eyes to voice.  Does not follow commands.       Cranial Nerves     CN III, IV, VI   Pupils are equal, round, and reactive to light.    CN VII   Right facial weakness: none  Left facial weakness: none    CN IX, X   Right gag reflex: normal  Left gag reflex: normal    Motor Exam   Minimal motor response to pain to painful stimuli.       Drains: * No LDAs found *    Imaging Results (Last 24 Hours)     Procedure Component Value Units Date/Time    XR Chest 1 View [[id number]] Collected: 04/30/23 0924     Updated: 04/30/23 0928    Narrative:      Frontal supine radiograph of the chest 4/30/2023 3:40 AM CDT     History: Intubated and sedated; S06.4X0A-Epidural hemorrhage without  loss of consciousness, initial encounter; Z74.09-Other reduced mobility;  Z98.890-Other specified postprocedural states; T81.42XA-Infection  following a procedure, deep incisional surgical site, initial encounter;  D32.9-Benign neoplasm of meninges, unspecified; E11.65-Type 2 diabetes  mellitus with hyperglycemia; Z79.4-Long term (current) u     Comparison: 04/29/2023      Findings:   The right IJ deep line has been removed. A tracheostomy remains in  place.. No new opacities or pneumothoraces are visualized in the chest.  The cardiomediastinal silhouette and pulmonary vascularity are  unchanged.       No acute osseous or soft tissue abnormality is noted.        Impression:      Impression:   1. Right IJ central line has been removed. Otherwise no change with  persistent diffuse airspace opacities within both lungs and effusions..        This report was finalized on 04/30/2023 09:25 by Dr. Zack Mendoza MD.        Lab Results (last 24 hours)     " Procedure Component Value Units Date/Time    Comprehensive Metabolic Panel [[id number]]  (Abnormal) Collected: 04/30/23 0819    Specimen: Blood Updated: 04/30/23 0903     Glucose 232 mg/dL      BUN 53 mg/dL      Creatinine 1.41 mg/dL      Sodium 136 mmol/L      Potassium 3.9 mmol/L      Chloride 92 mmol/L      CO2 31.0 mmol/L      Calcium 7.8 mg/dL      Total Protein 6.1 g/dL      Albumin 2.5 g/dL      ALT (SGPT) 22 U/L      AST (SGOT) 38 U/L      Alkaline Phosphatase 151 U/L      Total Bilirubin 0.7 mg/dL      Globulin 3.6 gm/dL      A/G Ratio 0.7 g/dL      BUN/Creatinine Ratio 37.6     Anion Gap 13.0 mmol/L      eGFR 54.6 mL/min/1.73     Narrative:      GFR Normal >60  Chronic Kidney Disease <60  Kidney Failure <15      CBC & Differential [[id number]]  (Abnormal) Collected: 04/30/23 0819    Specimen: Blood Updated: 04/30/23 0846    Narrative:      The following orders were created for panel order CBC & Differential.  Procedure                               Abnormality         Status                     ---------                               -----------         ------                     CBC Auto Differential[[id number]]        Abnormal            Final result                 Please view results for these tests on the individual orders.    CBC Auto Differential [[id number]]  (Abnormal) Collected: 04/30/23 0819    Specimen: Blood Updated: 04/30/23 0846     WBC 13.26 10*3/mm3      RBC 3.08 10*6/mm3      Hemoglobin 9.2 g/dL      Hematocrit 29.7 %      MCV 96.4 fL      MCH 29.9 pg      MCHC 31.0 g/dL      RDW 14.3 %      RDW-SD 50.0 fl      MPV 10.8 fL      Platelets 286 10*3/mm3      Neutrophil % 81.8 %      Lymphocyte % 7.5 %      Monocyte % 8.7 %      Eosinophil % 0.6 %      Basophil % 0.3 %      Immature Grans % 1.1 %      Neutrophils, Absolute 10.86 10*3/mm3      Lymphocytes, Absolute 0.99 10*3/mm3      Monocytes, Absolute 1.15 10*3/mm3      Eosinophils, Absolute 0.08 10*3/mm3      Basophils, Absolute 0.04 10*3/mm3       Immature Grans, Absolute 0.14 10*3/mm3      nRBC 0.5 /100 WBC     Blood Culture With SIMRAN - Blood, Hand, Right [[id number]]  (Normal) Collected: 04/29/23 1756    Specimen: Blood from Hand, Right Updated: 04/30/23 0630     Blood Culture No growth at less than 24 hours    Blood Culture With SIMRAN - Blood, Arm, Left [[id number]]  (Normal) Collected: 04/29/23 1757    Specimen: Blood from Arm, Left Updated: 04/30/23 0630     Blood Culture No growth at less than 24 hours    POC Glucose Once [[id number]]  (Abnormal) Collected: 04/30/23 0535    Specimen: Blood Updated: 04/30/23 0546     Glucose 193 mg/dL      Comment: : [id number] Mk HorneMeter ID: [id number]       Respiratory Culture - Aspirate, ET Suction [[id number]] Collected: 04/29/23 1954    Specimen: Aspirate from ET Suction Updated: 04/30/23 0533     Gram Stain Many (4+) WBCs per low power field      Few (2+) Epithelial cells per low power field      Rare (1+) Gram positive cocci    Blood Gas, Arterial - [[id number]]  (Abnormal) Collected: 04/30/23 0443    Specimen: Arterial Blood Updated: 04/30/23 0441     Site Right Radial     Marek's Test Positive     pH, Arterial 7.539 pH units      Comment: 83 Value above reference range        pCO2, Arterial 43.0 mm Hg      pO2, Arterial 64.7 mm Hg      Comment: 84 Value below reference range        HCO3, Arterial 36.6 mmol/L      Comment: 83 Value above reference range        Base Excess, Arterial 12.8 mmol/L      Comment: 83 Value above reference range        O2 Saturation, Arterial 94.3 %      Temperature 37.0 C      Barometric Pressure for Blood Gas 741 mmHg      Modality Ventilator     FIO2 50 %      Ventilator Mode AC     Set Tidal Volume 550     Set Mech Resp Rate 12.0     PEEP 8.0     Collected by [id number]     Comment: Meter: [id number]     :  [id number]        pCO2, Temperature Corrected 43.0 mm Hg      pH, Temp Corrected 7.539 pH Units      pO2, Temperature Corrected 64.7 mm Hg     Blood Culture -  Blood, Arm, Right [[id number]]  (Normal) Collected: 04/28/23 0130    Specimen: Blood from Arm, Right Updated: 04/30/23 0200     Blood Culture No growth at 2 days    Blood Culture - Blood, Arm, Right [[id number]]  (Normal) Collected: 04/28/23 0100    Specimen: Blood from Arm, Right Updated: 04/30/23 0200     Blood Culture No growth at 2 days    POC Glucose Once [[id number]]  (Abnormal) Collected: 04/29/23 2350    Specimen: Blood Updated: 04/30/23 0002     Glucose 161 mg/dL      Comment: : [id number] Anant CamargoMeter ID: [id number]       MRSA Screen, PCR (Inpatient) - Swab, Nares [[id number]]  (Abnormal) Collected: 04/29/23 1748    Specimen: Swab from Nares Updated: 04/29/23 1849     MRSA PCR MRSA Detected    Narrative:      The negative predictive value of this diagnostic test is high and should only be used to consider de-escalating anti-MRSA therapy. A positive result may indicate colonization with MRSA and must be correlated clinically.    CBC (No Diff) [[id number]]  (Abnormal) Collected: 04/29/23 1756    Specimen: Blood Updated: 04/29/23 1807     WBC 13.30 10*3/mm3      RBC 3.03 10*6/mm3      Hemoglobin 9.0 g/dL      Hematocrit 29.6 %      MCV 97.7 fL      MCH 29.7 pg      MCHC 30.4 g/dL      RDW 14.4 %      RDW-SD 51.1 fl      MPV 10.6 fL      Platelets 298 10*3/mm3     POC Glucose Once [[id number]]  (Abnormal) Collected: 04/29/23 1752    Specimen: Blood Updated: 04/29/23 1803     Glucose 183 mg/dL      Comment: : [id number] Nick BaezaMeter ID: [id number]       POC Glucose Once [[id number]]  (Abnormal) Collected: 04/29/23 1132    Specimen: Blood Updated: 04/29/23 1144     Glucose 187 mg/dL      Comment: : [id number] RinconMeter ID: [id number]           ADITYA Cespedes

## 2023-04-30 NOTE — PROGRESS NOTES
RT EQUIPMENT DEVICE RELATED - SKIN ASSESSMENT    RT Medical Equipment/Device:     Tracheostomy    Skin Assessment:      Neck:  Incision    Device Skin Pressure Protection:  Skin-to-device areas padded:  Optifoam    Nurse Notification:  Kylie Guzman RRT

## 2023-04-30 NOTE — PROGRESS NOTES
"Pharmacy Dosing Service  Pharmacokinetics  Vancomycin Follow-up Evaluation    Assessment/Action/Plan:  Current Order: Vancomycin 750 mg IVPB every 12 hours  Current end date: 5/4/23  Levels: Trough = 13.4 yesterday   Additional antimicrobial agent(s): Levofloxacin / Zosyn    Vancomycin trough evaluated/ no changes made yesterday. SCr=1.41 (increasing; see trend below). Given ongoing Zosyn & scheduled furosemide, ordered a follow up trough prior to the AM dose tomorrow. Pharmacy will continue to follow daily and adjust dose accordingly.     Subjective:  Elijah Escobar is a 67 y.o. male currently on Vancomycin 750 mg IV every 12 hours for the treatment of pneumonia, day 3 of 7 of treatment.    AUC Model Data:  Regimen: 750 mg IV every 12 hours.  Exposure target: AUC24 (range) 400-600 mg/L.hr   AUC24,ss: 505 mg/L.hr  PAUC*: 75 %  Ctrough,ss: 17.2 mg/L  Pconc*: 39 %  Tox.: 13 %    Objective:  Ht: 177.8 cm (70\"); Wt: 130 kg (286 lb 6.4 oz)  Estimated Creatinine Clearance: 68.9 mL/min (A) (by C-G formula based on SCr of 1.41 mg/dL (H)).     Creatinine   Date Value Ref Range Status   04/30/2023 1.41 (H) 0.76 - 1.27 mg/dL Final   04/29/2023 1.36 (H) 0.76 - 1.27 mg/dL Final   04/28/2023 1.25 0.76 - 1.27 mg/dL Final   08/31/2022 0.80 0.60 - 1.30 mg/dL Final     Comment:     Serial Number: [id number]:  [id number]   10/21/2020 1.03 0.60 - 1.30 mg/dL Final   02/07/2020 1.00 0.60 - 1.30 mg/dL Final   02/07/2020 1.00 0.60 - 1.30 mg/dL Final      Lab Results   Component Value Date    WBC 13.26 (H) 04/30/2023    WBC 13.30 (H) 04/29/2023    WBC 10.38 04/23/2023         Lab Results   Component Value Date    VANCOTROUGH 13.40 04/29/2023       Culture Results:  Microbiology Results (last 10 days)       Procedure Component Value - Date/Time    Respiratory Culture - Aspirate, ET Suction [[id number]] Collected: 04/29/23 1954    Lab Status: Preliminary result Specimen: Aspirate from ET Suction Updated: 04/30/23 0533     Gram Stain Many " (4+) WBCs per low power field      Few (2+) Epithelial cells per low power field      Rare (1+) Gram positive cocci    Blood Culture With SIMRAN - Blood, Arm, Left [[id number]]  (Normal) Collected: 04/29/23 1757    Lab Status: Preliminary result Specimen: Blood from Arm, Left Updated: 04/30/23 0630     Blood Culture No growth at less than 24 hours    Blood Culture With SIMRAN - Blood, Hand, Right [[id number]]  (Normal) Collected: 04/29/23 1756    Lab Status: Preliminary result Specimen: Blood from Hand, Right Updated: 04/30/23 0630     Blood Culture No growth at less than 24 hours    MRSA Screen, PCR (Inpatient) - Swab, Nares [[id number]]  (Abnormal) Collected: 04/29/23 1748    Lab Status: Final result Specimen: Swab from Nares Updated: 04/29/23 1849     MRSA PCR MRSA Detected    Narrative:      The negative predictive value of this diagnostic test is high and should only be used to consider de-escalating anti-MRSA therapy. A positive result may indicate colonization with MRSA and must be correlated clinically.    Blood Culture - Blood, Arm, Right [[id number]]  (Normal) Collected: 04/28/23 0130    Lab Status: Preliminary result Specimen: Blood from Arm, Right Updated: 04/30/23 0200     Blood Culture No growth at 2 days    Blood Culture - Blood, Arm, Right [[id number]]  (Normal) Collected: 04/28/23 0100    Lab Status: Preliminary result Specimen: Blood from Arm, Right Updated: 04/30/23 0200     Blood Culture No growth at 2 days            Faisal Grissom, PharmD   04/30/23 09:12 CDT

## 2023-04-30 NOTE — PROGRESS NOTES
INFECTIOUS DISEASES PROGRESS NOTE    Patient:  Elijah Escobar  YOB: 1955  MRN: [id number]   Admit date: 3/31/2023   Admitting Physician: Flaco Portillo, *  Primary Care Physician: Brianna Martinez APRN    Chief Complaint: Unobtainable from patient      Interval History: Called yesterday as patient had low-grade temp that was increasing.  Dr. Jeffers had left a note on the 26th that he recommended continuing 2 weeks of levofloxacin and he would be out until Wednesday, recommending to call me if any issues.    Patient had vancomycin and Zosyn added per pulmonary.  Reviewed note on 427 per Dr. Arreola where the patient had more shortness of breath and he had been contacted by nursing.  Stat chest x-ray revealed some increase in bilateral infiltrates-worsening pneumonia versus pulmonary edema.  He was given 40 mg of Lasix at that time and antibiotics were started.    No cultures were obtained at that time.  Blood cultures were obtained on April 28.  I ordered another set of blood cultures, respiratory culture and asked that the right internal jugular central line be removed if possible and peripheral IVs placed.  Per nursing the right internal jugular was placed during the patient's COVID approximately 2 weeks prior    Allergies: No Known Allergies    Current Scheduled Medications:   aspirin, 81 mg, Oral, Daily  atorvastatin, 20 mg, Oral, Nightly  budesonide, 0.5 mg, Nebulization, BID - RT  Chlorhexidine Gluconate Cloth, 1 application, Topical, Q24H  digoxin, 250 mcg, Oral, Daily  dilTIAZem, 90 mg, Oral, Q8H  furosemide, 40 mg, Intravenous, BID  gabapentin, 300 mg, Oral, Q8H  insulin detemir, 25 Units, Subcutaneous, Q12H  insulin lispro, 0-14 Units, Subcutaneous, Q6H  ipratropium, 0.5 mg, Nebulization, 4x Daily - RT  levETIRAcetam, 500 mg, Oral, BID  levoFLOXacin, 500 mg, Oral, Q24H  methylnaltrexone, 12 mg, Subcutaneous, Every Other Day  metoclopramide, 10 mg, Oral, 4x Daily AC & at  "Bedtime  metoprolol tartrate, 50 mg, Oral, Q12H  multivitamin with minerals, 1 tablet, Oral, Daily  pantoprazole, 40 mg, Intravenous, Q AM  piperacillin-tazobactam, 3.375 g, Intravenous, Q8H  polyethylene glycol, 17 g, Oral, Daily  sennosides-docusate, 1 tablet, Oral, Daily  sodium chloride, 10 mL, Intravenous, Q12H  sucralfate, 1 g, Oral, 4x Daily AC & at Bedtime  vancomycin, 750 mg, Intravenous, Q12H      Current PRN Medications:  •  acetaminophen **OR** acetaminophen **OR** acetaminophen  •  butalbital-acetaminophen-caffeine  •  calcium carbonate  •  dextrose  •  dextrose  •  glucagon (human recombinant)  •  hydrOXYzine  •  ipratropium  •  ipratropium-albuterol  •  Morphine  •  nicotine  •  ondansetron **OR** ondansetron  •  sodium chloride  •  sodium chloride    Review of Systems   Unable to perform ROS: Intubated       Objective     Vital Signs:  Temp (24hrs), Av.7 °F (37.6 °C), Min:98.7 °F (37.1 °C), Max:100.8 °F (38.2 °C)      /68   Pulse 82   Temp 99.8 °F (37.7 °C) (Oral)   Resp (!) 30   Ht 177.8 cm (70\")   Wt 130 kg (286 lb 6.4 oz)   SpO2 99%   BMI 41.09 kg/m²         Physical Exam  General: Patient is a 67-year-old gentleman lying in bed appearing comfortable in the CCU  HEENT: Sclera are anicteric.  Cranial surgical incision healed well  Neck: Supple, trach in place and connected to ventilator.  Dressing in place over site where right IJ was removed.  It is clean dry and intact  Abdomen: No mass appreciated  Neuro: Patient was not responsive to voice  Psych: Patient not agitated    Line/IV site: Peripheral IV x2 left upper extremity.  Dressings clean dry and intact  Results Review:    I reviewed the patient's new clinical results.    Lab Results:  CBC:   CBC w/diff        2023    23:54 2023    17:56 2023    08:19   CBC w/Diff   WBC 10.38   13.30   13.26     RBC 3.36   3.03   3.08     Hemoglobin 10.0   9.0   9.2     Hematocrit 33.1   29.6   29.7     MCV 98.5   97.7   96.4   "   MCH 29.8   29.7   29.9     MCHC 30.2   30.4   31.0     RDW 13.6   14.4   14.3     Platelets 245   298   286     Neutrophil Rel % 68.0    81.8     Immature Granulocyte Rel % 0.4    1.1     Lymphocyte Rel % 20.6    7.5     Monocyte Rel % 10.1    8.7     Eosinophil Rel % 0.5    0.6     Basophil Rel % 0.4    0.3            CMP:   Lab Results   Lab 04/28/23  0014 04/29/23  0642 04/30/23  0819   SODIUM 138 135* 136   POTASSIUM 4.2 3.9 3.9   CHLORIDE 95* 94* 92*   CO2 30.0* 27.0 31.0*   BUN 29* 44* 53*   CREATININE 1.25 1.36* 1.41*   CALCIUM 8.3* 8.1* 7.8*   BILIRUBIN 0.5 0.6 0.7   ALK PHOS 136* 110 151*   ALT (SGPT) 15 12 22   AST (SGOT) 29 27 38   GLUCOSE 196* 194* 232*       Estimated Creatinine Clearance: 68.9 mL/min (A) (by C-G formula based on SCr of 1.41 mg/dL (H)).    Culture Results:    Microbiology Results (last 10 days)     Procedure Component Value - Date/Time    Respiratory Culture - Aspirate, ET Suction [[id number]] Collected: 04/29/23 1954    Lab Status: Preliminary result Specimen: Aspirate from ET Suction Updated: 04/30/23 0533     Gram Stain Many (4+) WBCs per low power field      Few (2+) Epithelial cells per low power field      Rare (1+) Gram positive cocci    Blood Culture With SIMRAN - Blood, Arm, Left [[id number]]  (Normal) Collected: 04/29/23 1757    Lab Status: Preliminary result Specimen: Blood from Arm, Left Updated: 04/30/23 0630     Blood Culture No growth at less than 24 hours    Blood Culture With SIMRAN - Blood, Hand, Right [[id number]]  (Normal) Collected: 04/29/23 1756    Lab Status: Preliminary result Specimen: Blood from Hand, Right Updated: 04/30/23 0630     Blood Culture No growth at less than 24 hours    MRSA Screen, PCR (Inpatient) - Swab, Nares [[id number]]  (Abnormal) Collected: 04/29/23 1748    Lab Status: Final result Specimen: Swab from Nares Updated: 04/29/23 1849     MRSA PCR MRSA Detected    Narrative:      The negative predictive value of this diagnostic test is high and should  only be used to consider de-escalating anti-MRSA therapy. A positive result may indicate colonization with MRSA and must be correlated clinically.    Blood Culture - Blood, Arm, Right [[id number]]  (Normal) Collected: 04/28/23 0130    Lab Status: Preliminary result Specimen: Blood from Arm, Right Updated: 04/30/23 0200     Blood Culture No growth at 2 days    Blood Culture - Blood, Arm, Right [[id number]]  (Normal) Collected: 04/28/23 0100    Lab Status: Preliminary result Specimen: Blood from Arm, Right Updated: 04/30/23 0200     Blood Culture No growth at 2 days               Radiology:   Imaging Results (Last 72 Hours)     Procedure Component Value Units Date/Time    XR Chest 1 View [[id number]] Collected: 04/30/23 0924     Updated: 04/30/23 0928    Narrative:      Frontal supine radiograph of the chest 4/30/2023 3:40 AM CDT     History: Intubated and sedated; S06.4X0A-Epidural hemorrhage without  loss of consciousness, initial encounter; Z74.09-Other reduced mobility;  Z98.890-Other specified postprocedural states; T81.42XA-Infection  following a procedure, deep incisional surgical site, initial encounter;  D32.9-Benign neoplasm of meninges, unspecified; E11.65-Type 2 diabetes  mellitus with hyperglycemia; Z79.4-Long term (current) u     Comparison: 04/29/2023      Findings:   The right IJ deep line has been removed. A tracheostomy remains in  place.. No new opacities or pneumothoraces are visualized in the chest.  The cardiomediastinal silhouette and pulmonary vascularity are  unchanged.       No acute osseous or soft tissue abnormality is noted.        Impression:      Impression:   1. Right IJ central line has been removed. Otherwise no change with  persistent diffuse airspace opacities within both lungs and effusions..        This report was finalized on 04/30/2023 09:25 by Dr. Zack Mendoza MD.    XR Chest 1 View [[id number]] Collected: 04/29/23 0905     Updated: 04/29/23 0910    Narrative:       EXAMINATION: Chest 1 view 04/29/2023     HISTORY: Intubated and sedated.     FINDINGS: Today's exam is compared to previous study one day earlier. A  tracheostomy tube remains in place as well as a right IJ deep line with  the tip at the cavoatrial juncture. There is persistent mixed  interstitial and alveolar disease within both lungs either related to  diffuse pneumonia or pulmonary edema. Mild interval improvement in the  right lung from the previous exam. Small effusions are present blunting  the costophrenic angles.       Impression:      1.. Persistent diffuse airspace opacities. Mild improvement in the right  lung from the previous exam.  2. No interval line changes.  This report was finalized on 04/29/2023 09:07 by Dr. Zack Mendoza MD.    XR Chest 1 View [[id number]] Collected: 04/28/23 0720     Updated: 04/28/23 0724    Narrative:      HISTORY: Intubated and sedated     CXR: A frontal view the chest obtained     COMPARISON: 04/27/2023     FINDINGS: Tracheostomy tube remains appropriate in position. A right IJ  central line tip projects near the atrial caval junction. Similar  enlargement of the cardiac silhouette with diffuse bilateral pulmonary  opacities. Questionable trace pleural effusions with no pneumothorax.       Impression:      1. Stable 1 day view the chest. Persistent diffuse bilateral pulmonary  opacities suggestive for pulmonary edema and/or multifocal pneumonia.  This report was finalized on 04/28/2023 07:21 by Dr. Coleen Terry MD.    XR Chest 1 View [[id number]] Collected: 04/28/23 0709     Updated: 04/28/23 0714    Narrative:      EXAMINATION: XR CHEST 1 VW- 4/28/2023 7:09 AM CDT     HISTORY: S06.4X0A-Epidural hemorrhage without loss of consciousness,  initial encounter; Z74.09-Other reduced mobility; Z98.890-Other  specified postprocedural states; T81.42XA-Infection following a  procedure, deep incisional surgical site, initial encounter;  D32.9-Benign neoplasm of meninges,  unspecified; E11.65-Type 2 diabetes  mellitus with hyperglycemia;     REPORT: A frontal view of the chest was obtained.     COMPARISON: Chest x-ray 4/27/2023 0334 hours.     The tracheostomy tube and right internal jugular central line appear in  satisfactory position as before. There is interval increase in perihilar  infiltrates and vascular congestion, greater on the left, likely related  to pulmonary edema. Pneumonia would be less likely given the relatively  rapid change. No pneumothorax or pleural effusion is identified. Heart  size appears normal. The osseous structures are unremarkable.       Impression:      Stable satisfactory position of the right IJ central line  and tracheostomy tube. Increasing central infiltrates most likely  related to pulmonary edema. This is somewhat asymmetric and greater on  the left.  This report was finalized on 04/28/2023 07:11 by Dr. Elijah Grover MD.          Assessment & Plan     Active Hospital Problems    Diagnosis    • **Swelling of scalp    • Central apnea    • Elevated liver function tests    • COPD (chronic obstructive pulmonary disease)    • Obesity (BMI 30-39.9)    • Tobacco abuse, in remission    • Acute systolic heart failure    • ADDIE (obstructive sleep apnea)    • Type 2 myocardial infarction due to arrhythmia    • Acute pulmonary edema due to A-fib RVR    • Acute respiratory failure with hypoxia    • Intracranial epidural hematoma    • Post-operative infection    • Paroxysmal atrial fibrillation with rapid ventricular response    • Type 2 diabetes mellitus with hyperglycemia and peripheral neuropathy, with long-term current use of insulin (HCC)    • Coronary artery disease    • Meningioma s/p craniotomy        IMPRESSION:  1. Elevated temp to 100.8-this is new for patient.  White blood count essentially stable.  Requiring no pressors.  patient does have infiltrates on chest x-ray.  Appears FiO2 requirements have been decreasing since the 27th when he was up to  100%.  2. MRSA nasal screen now positive  3. Gram-positive cocci on Gram stain from respiratory specimen yesterday.  Respiratory culture is pending  4. Prior cranioplasty infection with Serratia-treated with meropenem now on levofloxacin via G-tube  5. Status post CODE BLUE with episode of PEA  6. Increasing creatinine  7. Diabetes mellitus      RECOMMENDATION:   · Continue levofloxacin for cranioplasty infection with Serratia marcescens  · Continue vancomycin given gram-positive cocci on respiratory specimen yesterday and MRSA nasal screen now positive.  Appreciate pharmacy assistance  · Discontinue Zosyn  · Only to monitor kidney function closely  · We will order labs for a.m.    Discussed with SELENA Rincon MD  04/30/23  10:24 CDT

## 2023-04-30 NOTE — PROGRESS NOTES
[facility]  ENT PROGRESS NOTES  2023      Patient Identification:  Name: Elijah Escobar  Age: 67 y.o.  Sex: male  :  1955  MRN: [id number]                     Date of Admission: 3/31/2023      CC:    Postop day #5 status post tracheostomy  Subjective   Patient continues to have intermittent air leaks which are positional according to nursing.  This morning when I was in the room there was some minimal leak with good tidal volumes on the ventilator.  However when he was turned off nearly came increases significantly.    HISTORY   HPI, ROS, PMFSHx reviewed:   No changes       Objective     PE:    Temp:  [98.7 °F (37.1 °C)-100.8 °F (38.2 °C)] 99.8 °F (37.7 °C)  Heart Rate:  [] 84  Resp:  [24-32] 30  BP: (103-142)/(59-88) 113/66  FiO2 (%):  [50 %-80 %] 70 %   Body mass index is 41.09 kg/m².     General appearance: chronically ill appearing.   Ability to Communicate: Patient is mechanically ventilated with tracheostomy and unresponsive and therefore unable to communicate   Facial exam: no craniofacial deformities   Ears - right ear normal, left ear normal.   Nasal exam - normal and patent, no erythema, discharge or polyps.   Oropharyngeal exam - mucous membranes moist, pharynx normal without lesions.   Neck exam -supple with #8 cuffed tracheostomy tube in place.  Pad is beneath the tracheostomy flange and the site looks good.     CVS exam: normal rate and regular rhythm.   Chest: Relatively normal chest excursion with ventilation.   No lymphadenopathy in the anterior or posterior neck, supraclavicular areas.   Neurological exam reveals neck supple without rigidity.    DATA      MEDICATIONS   I have reviewed the current MAR.     LABS AND IMAGING      I have reviewed the labs and imaging data since the patient was last seen.       Assessment     ASSESSMENT       Swelling of scalp    Meningioma s/p craniotomy    Coronary artery disease    Type 2 diabetes mellitus with hyperglycemia and  peripheral neuropathy, with long-term current use of insulin (HCC)    Paroxysmal atrial fibrillation with rapid ventricular response    Post-operative infection    Acute pulmonary edema due to A-fib RVR    Acute respiratory failure with hypoxia    Type 2 myocardial infarction due to arrhythmia    COPD (chronic obstructive pulmonary disease)    Obesity (BMI 30-39.9)    Tobacco abuse, in remission    Acute systolic heart failure    ADDIE (obstructive sleep apnea)    Intracranial epidural hematoma    Elevated liver function tests    Central apnea    Postop day #5 status post tracheostomy      Plan     PLAN     Continue local wound care  We will follow  Conservative measures for air leak-options seem otherwise limited          Wagner Villarreal MD  4/30/2023  09:37 CDT

## 2023-04-30 NOTE — PROGRESS NOTES
[facility] [place] PULMONARY AND CRITICAL CARE PROGRESS NOTE - [facility]    Patient: Elijah Escobar    1955    MR# [id number]    Acct# [id number]  04/30/23   10:57 CDT  Referring Provider: Flaco Portillo, *    Chief Complaint: Mechanically ventilated    Interval history: Patient was seen in the follow-up visit in pulmonary rounds in CCU today.  Recent events noted and chart reviewed.  He is unable to wean from the ventilator and remains on full assist-control volume control ventilation.  Current ventilator settings are tidal volume 588 rate of 12 PEEP of 8 and 60% FiO2.  Patient is unable to tolerate any weaning attempts due to oxygen desaturation.  His chest x-ray shows bilateral pulm infiltrates suggesting fluid overload and pneumonia.  He is afebrile.  He is currently getting Zosyn and vancomycin and levofloxacin.  He is also getting Lasix for fluid overload.  He is on propofol drip but he does not open his eyes and had no other acute events overnight.  Tube feeding is infusing    Meds:  aspirin, 81 mg, Oral, Daily  atorvastatin, 20 mg, Oral, Nightly  budesonide, 0.5 mg, Nebulization, BID - RT  Chlorhexidine Gluconate Cloth, 1 application, Topical, Q24H  digoxin, 250 mcg, Oral, Daily  dilTIAZem, 90 mg, Oral, Q8H  furosemide, 40 mg, Intravenous, BID  gabapentin, 300 mg, Oral, Q8H  insulin detemir, 25 Units, Subcutaneous, Q12H  insulin lispro, 0-14 Units, Subcutaneous, Q6H  ipratropium, 0.5 mg, Nebulization, 4x Daily - RT  levETIRAcetam, 500 mg, Oral, BID  levoFLOXacin, 500 mg, Oral, Q24H  methylnaltrexone, 12 mg, Subcutaneous, Every Other Day  metoclopramide, 10 mg, Oral, 4x Daily AC & at Bedtime  metoprolol tartrate, 50 mg, Oral, Q12H  multivitamin with minerals, 1 tablet, Oral, Daily  pantoprazole, 40 mg, Intravenous, Q AM  polyethylene glycol, 17 g, Oral, Daily  sennosides-docusate, 1 tablet, Oral, Daily  sodium chloride, 10 mL, Intravenous, Q12H  sucralfate, 1 g, Oral, 4x Daily AC & at  Bedtime  vancomycin, 750 mg, Intravenous, Q12H      dexmedetomidine, 0.2-1.5 mcg/kg/hr, Last Rate: 0.2 mcg/kg/hr (04/30/23 1052)  propofol, 5-50 mcg/kg/min, Last Rate: 20 mcg/kg/min (04/30/23 0713)        Ventilator Settings:        Resp Rate (Set): 12  Pressure Support (cm H2O): 10 cm H20  FiO2 (%): 70 %  PEEP/CPAP (cm H2O): 8 cm H20  Minute Ventilation (L/min) (Obs): 9.27 L/min  Resp Rate (Observed) Vent: 23  I:E Ratio (Set): 1:4.45  I:E Ratio (Obs): 1.69:1  PIP Observed (cm H2O): 18 cm H2O  RSBI: 0  Physical Exam:  Temp:  [98.7 °F (37.1 °C)-100.8 °F (38.2 °C)] 99.8 °F (37.7 °C)  Heart Rate:  [] 79  Resp:  [24-30] 30  BP: (103-139)/(59-84) 109/64  FiO2 (%):  [50 %-70 %] 70 %    Intake/Output Summary (Last 24 hours) at 4/30/2023 1057  Last data filed at 4/30/2023 0800  Gross per 24 hour   Intake 2936.54 ml   Output 1125 ml   Net 1811.54 ml     SpO2 Percentage    04/30/23 0930 04/30/23 1000 04/30/23 1030   SpO2: 99% 99% 100%   Body mass index is 41.09 kg/m².   Physical Exam   Constitutional:       Appearance: He is obese. He is ill-appearing.  Intubated, restrained.  No ventilator dyssynchrony.  HENT:      Head: Normocephalic. Right scalp wound, well-healed, no redness warmth or drainage     Nose: Nose normal.      Mouth/Throat: ETT/OG/TF  Eyes:      General:         Right eye: No discharge.         Left eye: No discharge.   Cardiovascular:      Rate and Rhythm: tachycardia   Pulmonary:      Effort: increased work of breathing      Breath sounds: No wheezing.  Diminished in bases  Abdominal:      General: Abdomen is flat.      Palpations: Abdomen is soft.    Musculoskeletal:      General: Bilateral wrist restraints, SCDs      Right lower leg: Edema      Left lower leg: Edema   Skin:     General: Skin is warm and dry.      Coloration: Skin is not jaundiced or pale.   Neurological:      General: Intubated        Result Review  Laboratory Data:  Results from last 7 days   Lab Units 04/30/23  0819 04/29/23  4393  04/23/23  2354   WBC 10*3/mm3 13.26* 13.30* 10.38   HEMOGLOBIN g/dL 9.2* 9.0* 10.0*   PLATELETS 10*3/mm3 286 298 245     Results from last 7 days   Lab Units 04/30/23  0819 04/29/23  0642 04/28/23  0014   SODIUM mmol/L 136 135* 138   POTASSIUM mmol/L 3.9 3.9 4.2   CO2 mmol/L 31.0* 27.0 30.0*   BUN mg/dL 53* 44* 29*   CREATININE mg/dL 1.41* 1.36* 1.25   LACTATE mmol/L  --   --  2.3*   CRP mg/dL  --   --  7.05*     Results from last 7 days   Lab Units 04/30/23  0443 04/29/23  0359 04/28/23  0421   PH, ARTERIAL pH units 7.539* 7.495* 7.455*   PCO2, ARTERIAL mm Hg 43.0 47.7* 51.0*   PO2 ART mm Hg 64.7* 106.0 88.8   FIO2 % 50 80 100     Microbiology Results (last 10 days)     Procedure Component Value - Date/Time    Respiratory Culture - Aspirate, ET Suction [[id number]] Collected: 04/29/23 1954    Lab Status: Preliminary result Specimen: Aspirate from ET Suction Updated: 04/30/23 0533     Gram Stain Many (4+) WBCs per low power field      Few (2+) Epithelial cells per low power field      Rare (1+) Gram positive cocci    Blood Culture With SIMRAN - Blood, Arm, Left [[id number]]  (Normal) Collected: 04/29/23 1757    Lab Status: Preliminary result Specimen: Blood from Arm, Left Updated: 04/30/23 0630     Blood Culture No growth at less than 24 hours    Blood Culture With SIMRAN - Blood, Hand, Right [[id number]]  (Normal) Collected: 04/29/23 1756    Lab Status: Preliminary result Specimen: Blood from Hand, Right Updated: 04/30/23 0630     Blood Culture No growth at less than 24 hours    MRSA Screen, PCR (Inpatient) - Swab, Nares [[id number]]  (Abnormal) Collected: 04/29/23 1748    Lab Status: Final result Specimen: Swab from Nares Updated: 04/29/23 1849     MRSA PCR MRSA Detected    Narrative:      The negative predictive value of this diagnostic test is high and should only be used to consider de-escalating anti-MRSA therapy. A positive result may indicate colonization with MRSA and must be correlated clinically.    Blood Culture -  Blood, Arm, Right [[id number]]  (Normal) Collected: 04/28/23 0130    Lab Status: Preliminary result Specimen: Blood from Arm, Right Updated: 04/30/23 0200     Blood Culture No growth at 2 days    Blood Culture - Blood, Arm, Right [[id number]]  (Normal) Collected: 04/28/23 0100    Lab Status: Preliminary result Specimen: Blood from Arm, Right Updated: 04/30/23 0200     Blood Culture No growth at 2 days         Recent films:  XR Chest 1 View    Result Date: 4/30/2023  Frontal supine radiograph of the chest 4/30/2023 3:40 AM CDT  History: Intubated and sedated; S06.4X0A-Epidural hemorrhage without loss of consciousness, initial encounter; Z74.09-Other reduced mobility; Z98.890-Other specified postprocedural states; T81.42XA-Infection following a procedure, deep incisional surgical site, initial encounter; D32.9-Benign neoplasm of meninges, unspecified; E11.65-Type 2 diabetes mellitus with hyperglycemia; Z79.4-Long term (current) u  Comparison: 04/29/2023  Findings: The right IJ deep line has been removed. A tracheostomy remains in place.. No new opacities or pneumothoraces are visualized in the chest. The cardiomediastinal silhouette and pulmonary vascularity are unchanged.   No acute osseous or soft tissue abnormality is noted.      Impression: Impression: 1. Right IJ central line has been removed. Otherwise no change with persistent diffuse airspace opacities within both lungs and effusions..   This report was finalized on 04/30/2023 09:25 by Dr. Zack Mendoza MD.    XR Chest 1 View    Result Date: 4/29/2023  EXAMINATION: Chest 1 view 04/29/2023  HISTORY: Intubated and sedated.  FINDINGS: Today's exam is compared to previous study one day earlier. A tracheostomy tube remains in place as well as a right IJ deep line with the tip at the cavoatrial juncture. There is persistent mixed interstitial and alveolar disease within both lungs either related to diffuse pneumonia or pulmonary edema. Mild interval improvement in  the right lung from the previous exam. Small effusions are present blunting the costophrenic angles.      Impression: 1.. Persistent diffuse airspace opacities. Mild improvement in the right lung from the previous exam. 2. No interval line changes. This report was finalized on 04/29/2023 09:07 by Dr. Zack Mendoza MD.     Personal review of imaging: CXR shows Reviewed current imaging study.  It showed increased interstitial opacities and vascular congestion likely from pulm edema.  No lung consolidation.  Tubes and lines are in position.      Pulmonary Assessment:  1. Acute on chronic hypoxic respiratory failure on mechanical ventilation orally intubated  2. Chronic congestive diastolic heart failure  3. Pulmonary infiltrate combination of pneumonia and fluid overload  4. Atrial fibrillation rate well controlled now  5. COPD with history of tobacco abuse  6. Meningioma s/p craniotomy, cranioplasty and flap infection.    7. Sepsis on multiple antibiotics/MRSA positive Neer's.  8. Encephalopathy  9. Hypertension  10. Diabetes mellitus insulin-dependent  11. Coronary artery disease  12. Obesity likely obstructive sleep apnea  13. Seizure disorder  14. Chronic pain    Recommend/plan:   · Patient was seen in follow-up visit in CCU.  Chart reviewed current events noted.  Case discussed with RN and RT  · Continue current assist-control volume control ventilation.  With high PEEP and FiO2 he is unable to wean so far.  · Current tidal volume 550, rate 12, PEEP 8, FiO2 60%, oxygen saturation 99%, no ventilator dyssynchrony.  · Continue current ventilator settings.  Patient failed spontaneous breathing trial.  We will try again when he is more stable  · On propofol.  We will try to change to Precedex and monitor for neuro improvement  · Tracheostomy in place.  PEG tube in place.  Continue tube feeding for nutritional support  · He is on Lasix for fluid overload.  Continue Lasix and monitor renal function and  electrolytes  · DVT and stress ulcer prophylaxis.  Pain and anxiety control.    · Overall prognosis seems guarded.  Dr. Portillo is going to talk to the family about palliative care.  · Craniotomy wound healing well.  Overall prognosis seems guarded to poor.  · Repeat labs and imaging studies from time to time.  · Code status: Full.  Overall prognosis: Guarded to poor  · We will follow    Electronically signed by     Tracy Gupta MD,  Pulmonologist/Intensivist   4/30/2023, 10:57 CDT

## 2023-04-30 NOTE — PROGRESS NOTES
RT EQUIPMENT DEVICE RELATED - SKIN ASSESSMENT    RT Medical Equipment/Device:     Tracheostomy    Skin Assessment:      Neck:  Intact    Device Skin Pressure Protection:  Absorbent pad utilzed/changed, Positioning supports utilized and Skin-to skin areas padded. Optifoam.    Nurse Notification:  Kylie Jimenez, RRT

## 2023-04-30 NOTE — PLAN OF CARE
Problem: Communication Impairment (Mechanical Ventilation, Invasive)  Goal: Effective Communication  Outcome: Ongoing, Not Progressing     Problem: Inability to Wean (Mechanical Ventilation, Invasive)  Goal: Mechanical Ventilation Liberation  Outcome: Ongoing, Not Progressing   Goal Outcome Evaluation:

## 2023-05-01 NOTE — PROGRESS NOTES
INFECTIOUS DISEASES PROGRESS NOTE    Patient:  Elijah Escobar  YOB: 1955  MRN: [id number]   Admit date: 3/31/2023   Admitting Physician: Flaco Portillo, *  Primary Care Physician: Brianna Martinez APRN    Chief Complaint: Unobtainable from patient      Interval History: Patient's FiO2 has been weaned from 70% when I saw her yesterday to 40% now.  Creatinine trending up.    Respiratory culture had not been finalized.  I contacted microbiology in [place].  They were able to pull the plates and tell me there is a pure culture of Staph aureus.  PBP testing indicated MRSA      Reviewed with SELENA Rincon.  Patient having lots of stool.  MiraLAX and stool softeners being held  Gabapentin which is a home med is also being held due to sedation.    Patient is tolerating tube feeding.    Allergies: No Known Allergies    Current Scheduled Medications:   aspirin, 81 mg, Oral, Daily  atorvastatin, 20 mg, Oral, Nightly  budesonide, 0.5 mg, Nebulization, BID - RT  Chlorhexidine Gluconate Cloth, 1 application, Topical, Q24H  digoxin, 250 mcg, Oral, Daily  dilTIAZem, 90 mg, Oral, Q8H  furosemide, 40 mg, Intravenous, BID  gabapentin, 300 mg, Oral, Q8H  insulin detemir, 25 Units, Subcutaneous, Q12H  insulin lispro, 0-14 Units, Subcutaneous, Q6H  ipratropium, 0.5 mg, Nebulization, 4x Daily - RT  levETIRAcetam, 500 mg, Oral, BID  levoFLOXacin, 500 mg, Oral, Q24H  methylnaltrexone, 12 mg, Subcutaneous, Every Other Day  metoclopramide, 10 mg, Oral, 4x Daily AC & at Bedtime  metoprolol tartrate, 50 mg, Oral, Q12H  multivitamin with minerals, 1 tablet, Oral, Daily  pantoprazole, 40 mg, Intravenous, Q AM  polyethylene glycol, 17 g, Oral, Daily  sennosides-docusate, 1 tablet, Oral, Daily  sodium chloride, 10 mL, Intravenous, Q12H  sucralfate, 1 g, Oral, 4x Daily AC & at Bedtime  vancomycin, 750 mg, Intravenous, Q12H      Current PRN Medications:  •  acetaminophen **OR** acetaminophen **OR** acetaminophen  •   "butalbital-acetaminophen-caffeine  •  calcium carbonate  •  dextrose  •  dextrose  •  glucagon (human recombinant)  •  hydrOXYzine  •  ipratropium  •  ipratropium-albuterol  •  Morphine  •  nicotine  •  ondansetron **OR** ondansetron  •  sodium chloride  •  sodium chloride    Review of Systems   Unable to perform ROS: Intubated       Objective     Vital Signs:  Temp (24hrs), Av °F (37.2 °C), Min:98.1 °F (36.7 °C), Max:99.6 °F (37.6 °C)      /65   Pulse 71   Temp 98.8 °F (37.1 °C) (Oral)   Resp 22   Ht 177.8 cm (70\")   Wt 130 kg (286 lb 6.4 oz)   SpO2 94%   BMI 41.09 kg/m²         Physical Exam  General: Patient is a 67-year-old gentleman lying in bed in no acute distress in CCU  HEENT: Sclera are anicteric.  Cranial surgical incision healed well  Neck: His head was turned to the left.  I was able to reposition his head without difficulty.  Trach in place and connected to ventilator.  Dressing in place over site where right IJ was removed.  It is clean dry and intact  Neuro: Patient not responsive to voice.  Psych: Patient not agitated    Line/IV site: Peripheral IV x2 left upper extremity.  Dressings clean dry and intact  Results Review:    I reviewed the patient's new clinical results.    Lab Results:  CBC:   CBC w/diff        2023    17:56 2023    08:19 2023    00:04   CBC w/Diff   WBC 13.30   13.26   12.05     RBC 3.03   3.08   3.08     Hemoglobin 9.0   9.2   9.1     Hematocrit 29.6   29.7   30.1     MCV 97.7   96.4   97.7     MCH 29.7   29.9   29.5     MCHC 30.4   31.0   30.2     RDW 14.4   14.3   14.4     Platelets 298   286   281     Neutrophil Rel %  81.8   76.8     Immature Granulocyte Rel %  1.1   1.1     Lymphocyte Rel %  7.5   12.9     Monocyte Rel %  8.7   8.1     Eosinophil Rel %  0.6   0.8     Basophil Rel %  0.3   0.3           CMP:   Lab Results   Lab 23  0642 23  0819 23  0004   SODIUM 135* 136 138   POTASSIUM 3.9 3.9 3.8   CHLORIDE 94* 92* 96*   CO2 " 27.0 31.0* 31.0*   BUN 44* 53* 57*   CREATININE 1.36* 1.41* 1.56*   CALCIUM 8.1* 7.8* 8.0*   BILIRUBIN 0.6 0.7 0.6   ALK PHOS 110 151* 134*   ALT (SGPT) 12 22 19   AST (SGOT) 27 38 25   GLUCOSE 194* 232* 115*       Estimated Creatinine Clearance: 62.3 mL/min (A) (by C-G formula based on SCr of 1.56 mg/dL (H)).    Culture Results:    Microbiology Results (last 10 days)     Procedure Component Value - Date/Time    Respiratory Culture - Aspirate, ET Suction [[id number]] Collected: 04/29/23 1954    Lab Status: Preliminary result Specimen: Aspirate from ET Suction Updated: 04/30/23 0533     Gram Stain Many (4+) WBCs per low power field      Few (2+) Epithelial cells per low power field      Rare (1+) Gram positive cocci    Blood Culture With SIMRAN - Blood, Arm, Left [[id number]]  (Normal) Collected: 04/29/23 1757    Lab Status: Preliminary result Specimen: Blood from Arm, Left Updated: 04/30/23 1831     Blood Culture No growth at 24 hours    Blood Culture With SIMRAN - Blood, Hand, Right [[id number]]  (Normal) Collected: 04/29/23 1756    Lab Status: Preliminary result Specimen: Blood from Hand, Right Updated: 04/30/23 1831     Blood Culture No growth at 24 hours    MRSA Screen, PCR (Inpatient) - Swab, Nares [[id number]]  (Abnormal) Collected: 04/29/23 1748    Lab Status: Final result Specimen: Swab from Nares Updated: 04/29/23 1849     MRSA PCR MRSA Detected    Narrative:      The negative predictive value of this diagnostic test is high and should only be used to consider de-escalating anti-MRSA therapy. A positive result may indicate colonization with MRSA and must be correlated clinically.    Blood Culture - Blood, Arm, Right [[id number]]  (Normal) Collected: 04/28/23 0130    Lab Status: Preliminary result Specimen: Blood from Arm, Right Updated: 05/01/23 0200     Blood Culture No growth at 3 days    Blood Culture - Blood, Arm, Right [[id number]]  (Normal) Collected: 04/28/23 0100    Lab Status: Preliminary result Specimen:  Blood from Arm, Right Updated: 05/01/23 0200     Blood Culture No growth at 3 days               Radiology:       May 1                                                                   April 30    Imaging Results (Last 72 Hours)     Procedure Component Value Units Date/Time    XR Chest 1 View [[id number]] Collected: 05/01/23 0722     Updated: 05/01/23 0728    Narrative:      EXAMINATION: XR CHEST 1 VW- 5/1/2023 7:22 AM CDT     HISTORY: Intubated and sedated; S06.4X0A-Epidural hemorrhage without  loss of consciousness, initial encounter; Z74.09-Other reduced mobility;  Z98.890-Other specified postprocedural states; T81.42XA-Infection  following a procedure, deep incisional surgical site, initial encounter;  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     COMPARISON: Chest x-ray 04/30/2023 0333 hours.     The tracheostomy tube remains in satisfactory position, there is volume  loss at the lung bases as before, with central vascular crowding and  hazy bilateral lung infiltrates, centrally predominant. There appears to  be increased atelectasis at the left base. There is borderline  cardiomegaly. No pneumothorax is identified. The osseous structures are  unremarkable.       Impression:      Increased airspace opacities at the left base may be due to  increasing atelectasis, more diffuse pulmonary infiltrates in both lungs  are stable. The tracheostomy tube remains in satisfactory position.  This report was finalized on 05/01/2023 07:25 by Dr. Elijah Grover MD.    XR Chest 1 View [[id number]] Collected: 04/30/23 0924     Updated: 04/30/23 0928    Narrative:      Frontal supine radiograph of the chest 4/30/2023 3:40 AM CDT     History: Intubated and sedated; S06.4X0A-Epidural hemorrhage without  loss of consciousness, initial encounter; Z74.09-Other reduced mobility;  Z98.890-Other specified postprocedural  states; T81.42XA-Infection  following a procedure, deep incisional surgical site, initial encounter;  D32.9-Benign neoplasm of meninges, unspecified; E11.65-Type 2 diabetes  mellitus with hyperglycemia; Z79.4-Long term (current) u     Comparison: 04/29/2023      Findings:   The right IJ deep line has been removed. A tracheostomy remains in  place.. No new opacities or pneumothoraces are visualized in the chest.  The cardiomediastinal silhouette and pulmonary vascularity are  unchanged.       No acute osseous or soft tissue abnormality is noted.        Impression:      Impression:   1. Right IJ central line has been removed. Otherwise no change with  persistent diffuse airspace opacities within both lungs and effusions..        This report was finalized on 04/30/2023 09:25 by Dr. Zack Mendoza MD.    XR Chest 1 View [[id number]] Collected: 04/29/23 0905     Updated: 04/29/23 0910    Narrative:      EXAMINATION: Chest 1 view 04/29/2023     HISTORY: Intubated and sedated.     FINDINGS: Today's exam is compared to previous study one day earlier. A  tracheostomy tube remains in place as well as a right IJ deep line with  the tip at the cavoatrial juncture. There is persistent mixed  interstitial and alveolar disease within both lungs either related to  diffuse pneumonia or pulmonary edema. Mild interval improvement in the  right lung from the previous exam. Small effusions are present blunting  the costophrenic angles.       Impression:      1.. Persistent diffuse airspace opacities. Mild improvement in the right  lung from the previous exam.  2. No interval line changes.  This report was finalized on 04/29/2023 09:07 by Dr. Zack Mendoza MD.          Assessment & Plan     Active Hospital Problems    Diagnosis    • **Swelling of scalp    • Central apnea    • Elevated liver function tests    • COPD (chronic obstructive pulmonary disease)    • Obesity (BMI 30-39.9)    • Tobacco abuse, in remission    • Acute  systolic heart failure    • ADDIE (obstructive sleep apnea)    • Type 2 myocardial infarction due to arrhythmia    • Acute pulmonary edema due to A-fib RVR    • Acute respiratory failure with hypoxia    • Intracranial epidural hematoma    • Post-operative infection    • Paroxysmal atrial fibrillation with rapid ventricular response    • Type 2 diabetes mellitus with hyperglycemia and peripheral neuropathy, with long-term current use of insulin (HCC)    • Coronary artery disease    • Meningioma s/p craniotomy        IMPRESSION:    Respiratory culture with MRSA  Increasing creatinine  Prior cranioplasty infection with Serratia-treated with meropenem now on levofloxacin via G-tube  Status post CODE BLUE with episode of PEA  Diabetes mellitus      RECOMMENDATION:   · Continue levofloxacin for cranioplasty infection with Serratia marcescens  · Discontinue vancomycin given increasing creatinine (patient did receive  Vanco and Zosyn combination)  · Start linezolid  · Discontinue Reglan-patient tolerating tube feeds  · Continue to hold stool softeners and MiraLAX given increase stool output  · Discontinue gabapentin which was a home medication given patient's sedation  · Continue to monitor kidney function and urine output closely  · Patient may need nephrology  input    Discussed with SELENA Rincon  Discussed with Dr. Candy Pike MD  05/01/23  09:21 CDT

## 2023-05-01 NOTE — CASE MANAGEMENT/SOCIAL WORK
Continued Stay Note   [place]     Patient Name: Elijah Escobar  MRN: [id number]  Today's Date: 5/1/2023    Admit Date: 3/31/2023    Plan: LTAC referral   Discharge Plan     Row Name 05/01/23 0935       Plan    Plan LTAC referral    Plan Comments Patient contines on vent.  Proceeding with LTAC referral pending insurance approval.               Discharge Codes    No documentation.                     TIANA Syed

## 2023-05-01 NOTE — SIGNIFICANT NOTE
04/30/23 1836   Readings   Plateau Pressure (cm H2O) 16 cm H2O   Driving Pressure (cm H2O) 8.3 cm H2O   Static Compliance (L/cm H2O) 41

## 2023-05-01 NOTE — PLAN OF CARE
Goal Outcome Evaluation:              Outcome Evaluation: Pt on 40% FiO2, TF at 65ml/hr, awiting LTAC approval

## 2023-05-01 NOTE — PROGRESS NOTES
[facility] [place] [place]ICAL [facility] [place]GRESS NOTE - [facility]    Patient: Elijah Escobar    1955    MR# [id number]    Acct# [id number]  05/01/23   09:06 CDT  Referring Provider: Flaco Portillo, *    Chief Complaint: Mechanically ventilated    Interval history: The patient [facility] Precedex infusing. He does not open eyes to voice or follow commands. O2 sat 94% on 8 PEEP, 0.40 FiO2. No ABG to review this AM. CXR with increased airspace opacities at the left base. No ventilator changes made. No other aggravating or alleviating factors.     Meds:  aspirin, 81 mg, Oral, Daily  atorvastatin, 20 mg, Oral, Nightly  budesonide, 0.5 mg, Nebulization, BID - RT  Chlorhexidine Gluconate Cloth, 1 application, Topical, Q24H  digoxin, 250 mcg, Oral, Daily  dilTIAZem, 90 mg, Oral, Q8H  furosemide, 40 mg, Intravenous, BID  gabapentin, 300 mg, Oral, Q8H  insulin detemir, 25 Units, Subcutaneous, Q12H  insulin lispro, 0-14 Units, Subcutaneous, Q6H  ipratropium, 0.5 mg, Nebulization, 4x Daily - RT  levETIRAcetam, 500 mg, Oral, BID  levoFLOXacin, 500 mg, Oral, Q24H  methylnaltrexone, 12 mg, Subcutaneous, Every Other Day  metoclopramide, 10 mg, Oral, 4x Daily AC & at Bedtime  metoprolol tartrate, 50 mg, Oral, Q12H  multivitamin with minerals, 1 tablet, Oral, Daily  pantoprazole, 40 mg, Intravenous, Q AM  polyethylene glycol, 17 g, Oral, Daily  sennosides-docusate, 1 tablet, Oral, Daily  sodium chloride, 10 mL, Intravenous, Q12H  sucralfate, 1 g, Oral, 4x Daily AC & at Bedtime  vancomycin, 750 mg, Intravenous, Q12H      dexmedetomidine, 0.2-1.5 mcg/kg/hr, Last Rate: 0.41 mcg/kg/hr (05/01/23 0841)  propofol, 5-50 mcg/kg/min, Last Rate: Stopped (04/30/23 1130)        Ventilator Settings:        Resp Rate (Set): 12  Pressure Support (cm H2O): 10 cm H20  FiO2 (%): 40 %  PEEP/CPAP (cm H2O): 8 cm H20  Minute Ventilation (L/min) (Obs): 8.26 L/min  Resp Rate (Observed) Vent: 23  I:E  Ratio (Set): 1:4.45  I:E Ratio (Obs): 1:3.2  PIP Observed (cm H2O): 25 cm H2O  RSBI: 0  Physical Exam:  Temp:  [98.1 °F (36.7 °C)-99.6 °F (37.6 °C)] 98.8 °F (37.1 °C)  Heart Rate:  [70-88] 71  Resp:  [20-26] 22  BP: ()/(61-75) 118/65  FiO2 (%):  [40 %] 40 %    Intake/Output Summary (Last 24 hours) at 5/1/2023 0906  Last data filed at 5/1/2023 0800  Gross per 24 hour   Intake 3034.7 ml   Output 1075 ml   Net 1959.7 ml     SpO2 Percentage    05/01/23 0800 05/01/23 0815 05/01/23 0830   SpO2: 95% 94% 94%   Body mass index is 41.09 kg/m².   Physical Exam   Constitutional:       Appearance: He is obese. He is ill-appearing.  Intubated, restrained.  No ventilator dyssynchrony.  HENT:      Head: Normocephalic. Right scalp wound, well-healed, no redness warmth or drainage     Nose: Nose normal.      Mouth/Throat: trach/TF  Eyes:      General:         Right eye: No discharge.         Left eye: No discharge.   Cardiovascular:      Rate: normal rate.   Pulmonary:      Effort: increased work of breathing      Breath sounds: No wheezing.  Diminished in bases  Abdominal:      General: PEG tube, Abdomen is distended.      Palpations: Abdomen is soft.    Musculoskeletal:      General: Bilateral wrist restraints, SCDs      Right lower leg: Edema      Left lower leg: Edema   Skin:     General: Skin is warm and dry.      Coloration: Skin is not jaundiced or pale.   Neurological:      General: Intubated      Electronically signed by ADITYA Foss, 5/1/2023, 09:08 CDT      Physician Substantive Portion: Medical Decision Making:    Physician substantive portion: medical decision making  Result Review  Laboratory Data:  Results from last 7 days   Lab Units 05/01/23  0004 04/30/23  0819 04/29/23  1756   WBC 10*3/mm3 12.05* 13.26* 13.30*   HEMOGLOBIN g/dL 9.1* 9.2* 9.0*   PLATELETS 10*3/mm3 281 286 298     Results from last 7 days   Lab Units 05/01/23  0004 04/30/23  0819 04/29/23  0642 04/28/23  0014   SODIUM mmol/L 138  136 135* 138   POTASSIUM mmol/L 3.8 3.9 3.9 4.2   CO2 mmol/L 31.0* 31.0* 27.0 30.0*   BUN mg/dL 57* 53* 44* 29*   CREATININE mg/dL 1.56* 1.41* 1.36* 1.25   LACTATE mmol/L  --   --   --  2.3*   CRP mg/dL 12.72*  --   --  7.05*     Results from last 7 days   Lab Units 04/30/23  0443 04/29/23  0359 04/28/23  0421   PH, ARTERIAL pH units 7.539* 7.495* 7.455*   PCO2, ARTERIAL mm Hg 43.0 47.7* 51.0*   PO2 ART mm Hg 64.7* 106.0 88.8   FIO2 % 50 80 100     Microbiology Results (last 10 days)     Procedure Component Value - Date/Time    Respiratory Culture - Aspirate, ET Suction [[id number]]  (Abnormal) Collected: 04/29/23 1954    Lab Status: Preliminary result Specimen: Aspirate from ET Suction Updated: 05/01/23 0930     Respiratory Culture Light growth (2+) Staphylococcus aureus, MRSA     Comment:   Methicillin resistant Staphylococcus aureus, Patient may be an isolation risk.         No Normal Respiratory Asmita     Gram Stain Many (4+) WBCs per low power field      Few (2+) Epithelial cells per low power field      Rare (1+) Gram positive cocci    Blood Culture With SIMRAN - Blood, Arm, Left [[id number]]  (Normal) Collected: 04/29/23 1757    Lab Status: Preliminary result Specimen: Blood from Arm, Left Updated: 04/30/23 1831     Blood Culture No growth at 24 hours    Blood Culture With SIMRAN - Blood, Hand, Right [[id number]]  (Normal) Collected: 04/29/23 1756    Lab Status: Preliminary result Specimen: Blood from Hand, Right Updated: 04/30/23 1831     Blood Culture No growth at 24 hours    MRSA Screen, PCR (Inpatient) - Swab, Nares [[id number]]  (Abnormal) Collected: 04/29/23 1748    Lab Status: Final result Specimen: Swab from Nares Updated: 04/29/23 1849     MRSA PCR MRSA Detected    Narrative:      The negative predictive value of this diagnostic test is high and should only be used to consider de-escalating anti-MRSA therapy. A positive result may indicate colonization with MRSA and must be correlated clinically.    Blood  Culture - Blood, Arm, Right [[id number]]  (Normal) Collected: 04/28/23 0130    Lab Status: Preliminary result Specimen: Blood from Arm, Right Updated: 05/01/23 0200     Blood Culture No growth at 3 days    Blood Culture - Blood, Arm, Right [[id number]]  (Normal) Collected: 04/28/23 0100    Lab Status: Preliminary result Specimen: Blood from Arm, Right Updated: 05/01/23 0200     Blood Culture No growth at 3 days         Recent films:  XR Chest 1 View    Result Date: 5/1/2023  EXAMINATION: XR CHEST 1 VW- 5/1/2023 7:22 AM CDT  HISTORY: Intubated and sedated; S06.4X0A-Epidural hemorrhage without loss of consciousness, initial encounter; Z74.09-Other reduced mobility; Z98.890-Other specified postprocedural states; T81.42XA-Infection following a procedure, deep incisional surgical site, initial encounter; 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  COMPARISON: Chest x-ray 04/30/2023 0333 hours.  The tracheostomy tube remains in satisfactory position, there is volume loss at the lung bases as before, with central vascular crowding and hazy bilateral lung infiltrates, centrally predominant. There appears to be increased atelectasis at the left base. There is borderline cardiomegaly. No pneumothorax is identified. The osseous structures are unremarkable.      Impression: Increased airspace opacities at the left base may be due to increasing atelectasis, more diffuse pulmonary infiltrates in both lungs are stable. The tracheostomy tube remains in satisfactory position. This report was finalized on 05/01/2023 07:25 by Dr. Elijah Grover MD.    XR Chest 1 View    Result Date: 4/30/2023  Frontal supine radiograph of the chest 4/30/2023 3:40 AM CDT  History: Intubated and sedated; S06.4X0A-Epidural hemorrhage without loss of consciousness, initial encounter; Z74.09-Other reduced mobility; Z98.890-Other specified  postprocedural states; T81.42XA-Infection following a procedure, deep incisional surgical site, initial encounter; D32.9-Benign neoplasm of meninges, unspecified; E11.65-Type 2 diabetes mellitus with hyperglycemia; Z79.4-Long term (current) u  Comparison: 04/29/2023  Findings: The right IJ deep line has been removed. A tracheostomy remains in place.. No new opacities or pneumothoraces are visualized in the chest. The cardiomediastinal silhouette and pulmonary vascularity are unchanged.   No acute osseous or soft tissue abnormality is noted.      Impression: Impression: 1. Right IJ central line has been removed. Otherwise no change with persistent diffuse airspace opacities within both lungs and effusions..   This report was finalized on 04/30/2023 09:25 by Dr. Zack Mendoza MD.     Personal review of imaging: Reviewed and agree with current interpretation.  Patient is got right IJ line removed and there is bilateral diffuse airspace opacities noted.      Pulmonary Assessment:  1. Acute on chronic hypoxic respiratory failure on mechanical ventilation orally intubated  2. Chronic congestive diastolic heart failure  3. Pulmonary infiltrate combination of pneumonia and fluid overload  4. Atrial fibrillation rate well controlled now  5. COPD with history of tobacco abuse  6. Meningioma s/p craniotomy, cranioplasty and flap infection.    7. Sepsis on multiple antibiotics/MRSA positive nasal swab  8. Acute kidney injury  9. Encephalopathy  10. Hypertension  11. Diabetes mellitus insulin-dependent  12. Coronary artery disease  13. Obesity likely obstructive sleep apnea  14. Seizure disorder  15. Chronic pain       Recommend/plan:   · Patient seen in follow-up visit in CCU.  Discussed with RN and Dr. Shannan Pang  · Patient had worsening renal function with elevated BUN and creatinine.  Lasix decreased to 40 mg once a day  · Antibiotic coverage changed from Zosyn and vancomycin to Zyvox and linezolid.  · Patient remains  on ventilator with assist control volume control mode.  Tidal volume decreased to 5 FiO2 40%.  · Titrate PEEP and FiO2 as tolerated to keep oxygen saturation more than 92%.  · Patient did not get spontaneous breathing trial today.  Will wait till he improves.  · No significant improvement in encephalopathy.  On Precedex drip.  · Still remains a full code.  Patient may need LTAC placement for continued ventilator dependence.  · Tracheostomy care per ENT.  Still has cough problems.  Continue bronchodilator treatment routine respiratory care.  · Monitor daily labs and renal function with electrolytes.  Chest x-ray as needed.  · Drain removed healing well.  DVT stress ulcer prophylaxis.  Wound care.  Pain and anxiety control.  · Nutritional support.  Further care per neurosurgery.  ID is also following  · CODE STATUS: Full.  Overall prognosis: Guarded.  · We will follow    This visit was performed by both a physician and an [facility] RN.  I personally evaluated and examined the patient.  I performed all aspects of the medical decision making as documented.    Electronically signed by     Tracy Gupta MD,  Pulmonologist/Intensivist   5/1/2023, 11:40 CDT

## 2023-05-01 NOTE — PROGRESS NOTES
Neurosurgery Daily Progress Note    HPI:  Elijah Escobar is a 67 y.o. male with a significant medical history of hypertension, diabetes, hyperlipidemia, seizures, craniotomy for tumor (6/16/2022), craniotomy for washout (7/1/2022), craniectomy (10/4/2022), coronary artery disease, diabetes, erectile dysfunction, GERD, hypertension, hyperlipidemia, tobacco abuse, and obesity.  He presents today with a complaint of a 4-5 days onset of progressively worsening right frontal, temporal, and periorbital edema and associated symptoms to include, but not limited to generalized fatigue, chills, dyspnea, intermittent nausea without vomiting, and states he's felt feverish.  Physical exam findings of neurologically intact with right frontal, temporal, and periorbital swelling.  WBC elevated at 15.  3 to an CRP elevated at 14.75.  Imaging pending.    Assessment:   Past Medical History:   Diagnosis Date   • Brain tumor    • Coronary artery disease    • COVID-19 vaccine series completed     MODERNA X 3; LAST DOSE 3/2022   • Diabetes    • Erectile dysfunction    • GERD (gastroesophageal reflux disease)    • Hypercholesteremia    • Hypertension    • Seizure      Active Hospital Problems    Diagnosis    • **Swelling of scalp    • Central apnea    • Elevated liver function tests    • COPD (chronic obstructive pulmonary disease)    • Obesity (BMI 30-39.9)    • Tobacco abuse, in remission    • Acute systolic heart failure    • ADDIE (obstructive sleep apnea)    • Type 2 myocardial infarction due to arrhythmia    • Acute pulmonary edema due to A-fib RVR    • Acute respiratory failure with hypoxia    • Intracranial epidural hematoma    • Post-operative infection    • Paroxysmal atrial fibrillation with rapid ventricular response    • Type 2 diabetes mellitus with hyperglycemia and peripheral neuropathy, with long-term current use of insulin (HCC)    • Coronary artery disease    • Meningioma s/p craniotomy      Plan:   Neuro: No change.   Does not open eyes to voice.  Does not follow commands.  Minimal motor response to painful stimuli.  + gag and cough.     Infected cranial flap      POD #24 (4/4/2023)  craniectomy and washout    Postop code CT and MRI stable. No acute abnormalities.    Flap tapped I&D cultures 4+ gram-negative bacilli    Heavy 4+ Serratia marcescens     POD #17 (4/13/2023) evacuation of scalp hematoma    JUAN removed     Continue neuro exams per policy.  Call for decline  EEG shows no epileptiform activity or ongoing seizures    6 Days Post-Op (4/25/2023) tracheostomy and PEG tube placement.    Appreciate ENT and general surgery    CV: Code for PEA, 4/16/2023.  Required epinephrine and bicarb   AFib/flutter, rate controlled.   Hold all anticoagulation for 2 weeks.   No evidence of DVT or PE per imaging   Appreciate cardiology  Pulm: Ventilation per trach   Increased pulmonary edema per CXR.    : DC Jeong catheter  FEN: Enteral feeding per PEG tube  Endocrine: Continue SSI  GI: SHERIF.  +BM  ID:  Infected cranial flap  CSF: no growth to date   PNA.  Resolved   UTI.  Resolved   Continue antibiotics.  Remains on Levaquin   Heme:  DVT prophylaxis with SCDs.  Hold all anticoagulation for 2 weeks.  Pain: No complaints at present  Dispo: PT/OT   Family has elected proceed with LTAC placement.  LTAC consulted     Chief complaint:   4-5 days onset of progressively worsening right frontal, temporal, and periorbital edema and associated symptoms to include, but not limited to generalized fatigue, chills, dyspnea, intermittent nausea without vomiting, and states he's felt feverish.    HPI  Subjective:  Symptoms:  Stable.      Temp:  [98.1 °F (36.7 °C)-99.6 °F (37.6 °C)] 98.8 °F (37.1 °C)  Heart Rate:  [70-88] 71  Resp:  [20-26] 22  BP: ()/(61-75) 118/65  FiO2 (%):  [40 %] 40 %    Output by Drain (mL) 04/30/23 0701 - 04/30/23 1900 04/30/23 1901 - 05/01/23 0700 05/01/23 0701 - 05/01/23 0905 Range Total   Requested LDAs do not have output data  "documented.     Objective:  Vital signs: (most recent): Blood pressure 118/65, pulse 71, temperature 98.8 °F (37.1 °C), temperature source Oral, resp. rate 22, height 177.8 cm (70\"), weight 130 kg (286 lb 6.4 oz), SpO2 94 %.      Neurologic Exam     Mental Status     Does not open eyes to voice.  Does not follow commands.       Cranial Nerves     CN III, IV, VI   Pupils are equal, round, and reactive to light.    CN VII   Right facial weakness: none  Left facial weakness: none    CN IX, X   Right gag reflex: normal  Left gag reflex: normal    Motor Exam   Minimal motor response to pain to painful stimuli.       Drains: * No LDAs found *    Imaging Results (Last 24 Hours)     Procedure Component Value Units Date/Time    XR Chest 1 View [[id number]] Collected: 05/01/23 0722     Updated: 05/01/23 0728    Narrative:      EXAMINATION: XR CHEST 1 VW- 5/1/2023 7:22 AM CDT     HISTORY: Intubated and sedated; S06.4X0A-Epidural hemorrhage without  loss of consciousness, initial encounter; Z74.09-Other reduced mobility;  Z98.890-Other specified postprocedural states; T81.42XA-Infection  following a procedure, deep incisional surgical site, initial encounter;  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     COMPARISON: Chest x-ray 04/30/2023 0333 hours.     The tracheostomy tube remains in satisfactory position, there is volume  loss at the lung bases as before, with central vascular crowding and  hazy bilateral lung infiltrates, centrally predominant. There appears to  be increased atelectasis at the left base. There is borderline  cardiomegaly. No pneumothorax is identified. The osseous structures are  unremarkable.       Impression:      Increased airspace opacities at the left base may be due to  increasing atelectasis, more diffuse pulmonary infiltrates in both lungs  are stable. The tracheostomy tube remains in " satisfactory position.  This report was finalized on 05/01/2023 07:25 by Dr. Elijah Grover MD.    XR Chest 1 View [[id number]] Collected: 04/30/23 0924     Updated: 04/30/23 0928    Narrative:      Frontal supine radiograph of the chest 4/30/2023 3:40 AM CDT     History: Intubated and sedated; S06.4X0A-Epidural hemorrhage without  loss of consciousness, initial encounter; Z74.09-Other reduced mobility;  Z98.890-Other specified postprocedural states; T81.42XA-Infection  following a procedure, deep incisional surgical site, initial encounter;  D32.9-Benign neoplasm of meninges, unspecified; E11.65-Type 2 diabetes  mellitus with hyperglycemia; Z79.4-Long term (current) u     Comparison: 04/29/2023      Findings:   The right IJ deep line has been removed. A tracheostomy remains in  place.. No new opacities or pneumothoraces are visualized in the chest.  The cardiomediastinal silhouette and pulmonary vascularity are  unchanged.       No acute osseous or soft tissue abnormality is noted.        Impression:      Impression:   1. Right IJ central line has been removed. Otherwise no change with  persistent diffuse airspace opacities within both lungs and effusions..        This report was finalized on 04/30/2023 09:25 by Dr. Zack Mendoza MD.        Lab Results (last 24 hours)     Procedure Component Value Units Date/Time    POC Glucose Once [[id number]]  (Normal) Collected: 05/01/23 0529    Specimen: Blood Updated: 05/01/23 0540     Glucose 104 mg/dL      Comment: : [id number] Jalil Stubbs ID: [id number]       POC Glucose Once [[id number]]  (Normal) Collected: 05/01/23 0353    Specimen: Blood Updated: 05/01/23 0410     Glucose 113 mg/dL      Comment: : [id number] Jalil Stubbs ID: [id number]       Blood Culture - Blood, Arm, Right [[id number]]  (Normal) Collected: 04/28/23 0130    Specimen: Blood from Arm, Right Updated: 05/01/23 0200     Blood Culture No growth at 3 days    Blood Culture - Blood, Arm,  Right [[id number]]  (Normal) Collected: 04/28/23 0100    Specimen: Blood from Arm, Right Updated: 05/01/23 0200     Blood Culture No growth at 3 days    Comprehensive Metabolic Panel [[id number]]  (Abnormal) Collected: 05/01/23 0004    Specimen: Blood Updated: 05/01/23 0037     Glucose 115 mg/dL      BUN 57 mg/dL      Creatinine 1.56 mg/dL      Sodium 138 mmol/L      Potassium 3.8 mmol/L      Chloride 96 mmol/L      CO2 31.0 mmol/L      Calcium 8.0 mg/dL      Total Protein 6.1 g/dL      Albumin 2.6 g/dL      ALT (SGPT) 19 U/L      AST (SGOT) 25 U/L      Alkaline Phosphatase 134 U/L      Total Bilirubin 0.6 mg/dL      Globulin 3.5 gm/dL      A/G Ratio 0.7 g/dL      BUN/Creatinine Ratio 36.5     Anion Gap 11.0 mmol/L      eGFR 48.4 mL/min/1.73     Narrative:      GFR Normal >60  Chronic Kidney Disease <60  Kidney Failure <15      C-reactive Protein [[id number]]  (Abnormal) Collected: 05/01/23 0004    Specimen: Blood Updated: 05/01/23 0037     C-Reactive Protein 12.72 mg/dL     Sedimentation Rate [[id number]]  (Abnormal) Collected: 05/01/23 0004    Specimen: Blood Updated: 05/01/23 0026     Sed Rate 76 mm/hr     CBC & Differential [[id number]]  (Abnormal) Collected: 05/01/23 0004    Specimen: Blood Updated: 05/01/23 0020    Narrative:      The following orders were created for panel order CBC & Differential.  Procedure                               Abnormality         Status                     ---------                               -----------         ------                     CBC Auto Differential[[id number]]        Abnormal            Final result                 Please view results for these tests on the individual orders.    CBC Auto Differential [[id number]]  (Abnormal) Collected: 05/01/23 0004    Specimen: Blood Updated: 05/01/23 0020     WBC 12.05 10*3/mm3      RBC 3.08 10*6/mm3      Hemoglobin 9.1 g/dL      Hematocrit 30.1 %      MCV 97.7 fL      MCH 29.5 pg      MCHC 30.2 g/dL      RDW 14.4 %      RDW-SD 51.2 fl       MPV 11.0 fL      Platelets 281 10*3/mm3      Neutrophil % 76.8 %      Lymphocyte % 12.9 %      Monocyte % 8.1 %      Eosinophil % 0.8 %      Basophil % 0.3 %      Immature Grans % 1.1 %      Neutrophils, Absolute 9.25 10*3/mm3      Lymphocytes, Absolute 1.55 10*3/mm3      Monocytes, Absolute 0.98 10*3/mm3      Eosinophils, Absolute 0.10 10*3/mm3      Basophils, Absolute 0.04 10*3/mm3      Immature Grans, Absolute 0.13 10*3/mm3      nRBC 0.9 /100 WBC     POC Glucose Once [[id number]]  (Normal) Collected: 04/30/23 2337    Specimen: Blood Updated: 04/30/23 2348     Glucose 122 mg/dL      Comment: : [id number] Jalil Stubbs ID: [id number]       Blood Culture With SIMRAN - Blood, Arm, Left [[id number]]  (Normal) Collected: 04/29/23 1757    Specimen: Blood from Arm, Left Updated: 04/30/23 1831     Blood Culture No growth at 24 hours    Blood Culture With SIMRAN - Blood, Hand, Right [[id number]]  (Normal) Collected: 04/29/23 1756    Specimen: Blood from Hand, Right Updated: 04/30/23 1831     Blood Culture No growth at 24 hours    POC Glucose Once [[id number]]  (Abnormal) Collected: 04/30/23 1719    Specimen: Blood Updated: 04/30/23 1730     Glucose 156 mg/dL      Comment: : [email or web address] Bahena ID: [id number]       POC Glucose Once [[id number]]  (Abnormal) Collected: 04/30/23 1126    Specimen: Blood Updated: 04/30/23 1142     Glucose 231 mg/dL      Comment: : [email or web address] Bahena ID: [id number]           Rahul Arnold APRN

## 2023-05-01 NOTE — PROGRESS NOTES
RT EQUIPMENT DEVICE RELATED - SKIN ASSESSMENT    RT Medical Equipment/Device:     Tracheostomy    Skin Assessment:      Neck:  Incision    Device Skin Pressure Protection:  Skin-to-device areas padded:  Optifoam    Nurse Notification:  Kylie Rosenbaum RRT

## 2023-05-01 NOTE — PLAN OF CARE
Goal Outcome Evaluation:              Outcome Evaluation: RD nutrition follow-up completed.  Pt's remains NPO @ this time with TF ongoing-mininal residuals noted.  Will continue to montor TF tolerance, and follow for further d/c needs

## 2023-05-01 NOTE — PLAN OF CARE
Goal Outcome Evaluation:   Pt no commands. 40% FiO2, peep 8. SBP 110s. 600 UOP this shift. Precedex @ 0.8 mcg/kg/hr. VSS. Turned q2. Safety maintained.

## 2023-05-02 NOTE — PLAN OF CARE
Goal Outcome Evaluation:  Plan of Care Reviewed With: patient        Progress: no change  Outcome Evaluation: neuro status unchanged. Residuals on tube feeding increasing. UOP adequate. Turn Q2

## 2023-05-02 NOTE — SIGNIFICANT NOTE
05/01/23 1816   Readings   Plateau Pressure (cm H2O) 9.2 cm H2O   Driving Pressure (cm H2O) 4.3 cm H2O   Static Compliance (L/cm H2O) 15

## 2023-05-02 NOTE — PROGRESS NOTES
[facility] PULMONARY AND CRITICAL CARE PROGRESS NOTE - [facility]    Patient: Elijah Escobar    1955    MR# [id number]    Acct# [id number]  05/02/23   08:30 CDT  Referring Provider: Flaco Portillo, *    Chief Complaint: Mechanically ventilated    Interval history: The patient remains intubated with trach to [facility] vent. Precedex infusing. Eyes are open this AM, but he does not follow commands. O2 sat 96% on 5 PEEP, 0.30 FiO2. AM ABG and CXR reviewed. No ventilator changes. No other aggravating or alleviating factors. Waiting LTACH placement.    Meds:  aspirin, 81 mg, Oral, Daily  atorvastatin, 20 mg, Oral, Nightly  budesonide, 0.5 mg, Nebulization, BID - RT  Chlorhexidine Gluconate Cloth, 1 application, Topical, Q24H  digoxin, 250 mcg, Oral, Daily  dilTIAZem, 90 mg, Oral, Q8H  furosemide, 40 mg, Intravenous, Daily  insulin detemir, 25 Units, Subcutaneous, Q12H  insulin lispro, 0-14 Units, Subcutaneous, Q6H  ipratropium, 0.5 mg, Nebulization, 4x Daily - RT  levETIRAcetam, 500 mg, Oral, BID  levoFLOXacin, 500 mg, Oral, Q24H  Linezolid, 600 mg, Intravenous, Q12H  methylnaltrexone, 12 mg, Subcutaneous, Every Other Day  metoprolol tartrate, 50 mg, Oral, Q12H  multivitamin with minerals, 1 tablet, Oral, Daily  pantoprazole, 40 mg, Intravenous, Q AM  polyethylene glycol, 17 g, Oral, Daily  sennosides-docusate, 1 tablet, Oral, Daily  sodium chloride, 10 mL, Intravenous, Q12H      dexmedetomidine, 0.2-1.5 mcg/kg/hr, Last Rate: 0.8 mcg/kg/hr (05/02/23 0811)  propofol, 5-50 mcg/kg/min, Last Rate: Stopped (04/30/23 1130)        Ventilator Settings:        Resp Rate (Set): 12  Pressure Support (cm H2O): 10 cm H20  FiO2 (%): 50 %  PEEP/CPAP (cm H2O): 5 cm H20  Minute Ventilation (L/min) (Obs): 5.36 L/min  Resp Rate (Observed) Vent: 21  I:E Ratio (Set): 1:4.45  I:E Ratio (Obs): 1.2:1  PIP Observed (cm H2O): 17 cm H2O  RSBI: 0  Physical Exam:  Temp:  [98.2 °F (36.8 °C)-99.4 °F (37.4 °C)] 98.7 °F (37.1  °C)  Heart Rate:  [61-72] 64  Resp:  [18-24] 18  BP: (109-145)/(62-71) 125/71  FiO2 (%):  [40 %-50 %] 50 %    Intake/Output Summary (Last 24 hours) at 5/2/2023 0830  Last data filed at 5/2/2023 0824  Gross per 24 hour   Intake 2744.6 ml   Output 1225 ml   Net 1519.6 ml     SpO2 Percentage    05/02/23 0610 05/02/23 0700 05/02/23 0800   SpO2: 100% 100% 95%   Body mass index is 39.6 kg/m².   Physical Exam   Constitutional:       Appearance: He is obese. He is ill-appearing.  Intubated, restrained.  No ventilator dyssynchrony.  HENT:      Head: Normocephalic. Right scalp wound, well-healed, no redness warmth or drainage     Nose: Nose normal.      Mouth/Throat: trach/TF  Eyes:      General:         Right eye: No discharge.         Left eye: No discharge.   Cardiovascular:      Rate: normal rate.   Pulmonary:      Effort: increased work of breathing      Breath sounds: No wheezing.  Diminished in bases  Abdominal:      General: PEG tube, Abdomen is distended.      Palpations: Abdomen is soft.    Musculoskeletal:      General: Bilateral wrist restraints, SCDs      Right lower leg: Edema      Left lower leg: Edema   Skin:     General: Skin is warm and dry.      Coloration: Skin is not jaundiced or pale.   Neurological:      General: Intubated      Electronically signed by ADITYA Foss, 5/2/2023, 08:30 CDT      Physician substantive portion: medical decision making  Result Review  Laboratory Data:  Results from last 7 days   Lab Units 05/01/23  0004 04/30/23  0819 04/29/23  1756   WBC 10*3/mm3 12.05* 13.26* 13.30*   HEMOGLOBIN g/dL 9.1* 9.2* 9.0*   PLATELETS 10*3/mm3 281 286 298     Results from last 7 days   Lab Units 05/02/23  0852 05/01/23  0004 04/30/23  0819 04/29/23  0642 04/28/23  0014   SODIUM mmol/L 136 138 136   < > 138   POTASSIUM mmol/L 3.7 3.8 3.9   < > 4.2   CO2 mmol/L 30.0* 31.0* 31.0*   < > 30.0*   BUN mg/dL 56* 57* 53*   < > 29*   CREATININE mg/dL 1.41* 1.56* 1.41*   < > 1.25   LACTATE mmol/L   --   --   --   --  2.3*   CRP mg/dL  --  12.72*  --   --  7.05*    < > = values in this interval not displayed.     Results from last 7 days   Lab Units 05/02/23  0415 04/30/23  0443 04/29/23  0359   PH, ARTERIAL pH units 7.556* 7.539* 7.495*   PCO2, ARTERIAL mm Hg 41.6 43.0 47.7*   PO2 ART mm Hg 74.3* 64.7* 106.0   FIO2 % 50 50 80     Microbiology Results (last 10 days)     Procedure Component Value - Date/Time    Respiratory Culture - Aspirate, ET Suction [[id number]]  (Abnormal)  (Susceptibility) Collected: 04/29/23 1954    Lab Status: Final result Specimen: Aspirate from ET Suction Updated: 05/02/23 0902     Respiratory Culture Light growth (2+) Staphylococcus aureus, MRSA     Comment:   Methicillin resistant Staphylococcus aureus, Patient may be an isolation risk.         No Normal Respiratory Asmita     Gram Stain Many (4+) WBCs per low power field      Few (2+) Epithelial cells per low power field      Rare (1+) Gram positive cocci    Susceptibility      Staphylococcus aureus, MRSA      STALIN      Clindamycin Resistant     Inducible Clindamycin Resistance Negative      Linezolid Susceptible      Oxacillin Resistant     Tetracycline Resistant     Trimethoprim + Sulfamethoxazole Resistant     Vancomycin Susceptible                           Blood Culture With SIMRAN - Blood, Arm, Left [[id number]]  (Normal) Collected: 04/29/23 1757    Lab Status: Preliminary result Specimen: Blood from Arm, Left Updated: 05/01/23 1830     Blood Culture No growth at 2 days    Blood Culture With SIMRAN - Blood, Hand, Right [[id number]]  (Normal) Collected: 04/29/23 1756    Lab Status: Preliminary result Specimen: Blood from Hand, Right Updated: 05/01/23 1830     Blood Culture No growth at 2 days    MRSA Screen, PCR (Inpatient) - Swab, Nares [[id number]]  (Abnormal) Collected: 04/29/23 1748    Lab Status: Final result Specimen: Swab from Nares Updated: 04/29/23 1849     MRSA PCR MRSA Detected    Narrative:      The negative predictive value of  this diagnostic test is high and should only be used to consider de-escalating anti-MRSA therapy. A positive result may indicate colonization with MRSA and must be correlated clinically.    Blood Culture - Blood, Arm, Right [[id number]]  (Normal) Collected: 04/28/23 0130    Lab Status: Preliminary result Specimen: Blood from Arm, Right Updated: 05/02/23 0200     Blood Culture No growth at 4 days    Blood Culture - Blood, Arm, Right [[id number]]  (Normal) Collected: 04/28/23 0100    Lab Status: Preliminary result Specimen: Blood from Arm, Right Updated: 05/02/23 0200     Blood Culture No growth at 4 days         Recent films:  XR Chest 1 View    Result Date: 5/2/2023  EXAMINATION:  XR CHEST 1 VW-  5/2/2023 3:35 AM CDT  HISTORY: Intubated and sedated. S06.4X0A-Epidural hemorrhage without loss of consciousness, initial encounter; Z74.09-Other reduced mobility; Z98.890-Other specified postprocedural states; T81.42XA-Infection following a procedure, deep incisional surgical site, initial encounter.  COMPARISON: 05/01/2023.  TECHNIQUE: Single view AP image.  FINDINGS:  There are diffuse bilateral lung infiltrates without significant change. There is blunting of the costophrenic angles. There is cardiomegaly. The thoracic aorta is atheromatous. Tracheostomy tube remains in place. No acute bony abnormality is seen.       Impression: 1. Diffuse lung infiltrates. No significant change. 2. Blunting of the costophrenic angles. Cardiomegaly.   This report was finalized on 05/02/2023 07:09 by Dr. Arian Stauffer MD.    XR Chest 1 View    Result Date: 5/1/2023  EXAMINATION: XR CHEST 1 VW- 5/1/2023 7:22 AM CDT  HISTORY: Intubated and sedated; S06.4X0A-Epidural hemorrhage without loss of consciousness, initial encounter; Z74.09-Other reduced mobility; Z98.890-Other specified postprocedural states; T81.42XA-Infection following a procedure, deep incisional surgical site, initial encounter; D32.9-Benign neoplasm of meninges, unspecified;  E11.65-Type 2 diabetes mellitus with hyperglycemia;  REPORT: A frontal view of the chest was obtained.  COMPARISON: Chest x-ray 04/30/2023 0333 hours.  The tracheostomy tube remains in satisfactory position, there is volume loss at the lung bases as before, with central vascular crowding and hazy bilateral lung infiltrates, centrally predominant. There appears to be increased atelectasis at the left base. There is borderline cardiomegaly. No pneumothorax is identified. The osseous structures are unremarkable.      Impression: Increased airspace opacities at the left base may be due to increasing atelectasis, more diffuse pulmonary infiltrates in both lungs are stable. The tracheostomy tube remains in satisfactory position. This report was finalized on 05/01/2023 07:25 by Dr. Elijah Grover MD.     Personal review of imaging: Chest x-ray done on 5/1/2023 showed Impression: Increased airspace opacities at the left base may be due to increasing atelectasis, more diffuse pulmonary infiltrates in both lungs are stable. The tracheostomy tube remains in satisfactory position. This report was finalized on 05/01/2023 07:25 by Dr. Elijah Gorver MD.      Pulmonary Assessment:  1. Acute on chronic hypoxic respiratory failure on mechanical ventilation orally intubated  2. Chronic congestive diastolic heart failure  3. Pulmonary infiltrate combination of pneumonia and fluid overload  4. Atrial fibrillation rate well controlled now  5. COPD with history of tobacco abuse  6. Meningioma s/p craniotomy, cranioplasty and flap infection.    7. Sepsis on multiple antibiotics/MRSA positive nasal swab  8. Acute kidney injury  9. Encephalopathy  10. Hypertension  11. Diabetes mellitus insulin-dependent  12. Coronary artery disease  13. Obesity likely obstructive sleep apnea  14. Seizure disorder  15. Chronic pain     Recommend/plan:   • Patient seen in follow-up visit in CCU.  He is still on the ventilator with tracheostomy in  place  • He is passively breathing without any dyssynchrony.  • Lasix dose decreased renal function stabilized.  • Antibiotic to continue on linezolid and levofloxacin.  ID following  • Current ventilator settings: Assist-control/volume control ventilation: Tidal volume 550, rate 12, PEEP 5, FiO2 30% O2 sat 96%   • Titrate PEEP and FiO2 as tolerated to keep oxygen saturation more than 92%.  • Try spontaneous breathing trial when he is more stable  • Patient remains encephalopathic and does not open eyes.  On Precedex drip to continue  • He still remains a full code.  LTAC referral has been done.  • Tracheostomy care per ENT.  Still has cuff problems.  • Continue bronchodilator treatment routine respiratory care.  • Monitor daily labs and renal function with electrolytes.  Chest x-ray as needed.  • DVT stress ulcer prophylaxis.  Wound care.  Pain and anxiety control.  • Nutritional support.  Further care per neurosurgery.  ID is also following  • CODE STATUS: Full.  Overall prognosis: Guarded.  • We will follow    This visit was performed by both a physician and an [facility] RN.  I personally evaluated and examined the patient.  I performed all aspects of the medical decision making as documented.    Electronically signed by     Trcay Gupta MD,  Pulmonologist/Intensivist   5/2/2023, 17:54 CDT

## 2023-05-02 NOTE — PLAN OF CARE
Goal Outcome Evaluation:   Pt on 50% FiO2. TF at 65ml/hr. 700 mL UOP this shift. Precedex at 0.8 mcg/kg/hr. VSS. Turned q2.

## 2023-05-02 NOTE — PROGRESS NOTES
INFECTIOUS DISEASES PROGRESS NOTE    Patient:  Elijah Escobar  YOB: 1955  MRN: [id number]   Admit date: 3/31/2023   Admitting Physician: Flaco Portillo, *  Primary Care Physician: Brianna Martinez APRN    Chief Complaint: Unobtainable from patient      Interval History: Patient's FiO2 has been weaned further to 40%.  No labs were ordered today for chemistries.  I ordered a BMP to monitor creatinine.    Dr. Gupta decreased his Lasix from twice a day to once a day yesterday    Still awaiting LTAC approval      Allergies: No Known Allergies    Current Scheduled Medications:   aspirin, 81 mg, Oral, Daily  atorvastatin, 20 mg, Oral, Nightly  budesonide, 0.5 mg, Nebulization, BID - RT  Chlorhexidine Gluconate Cloth, 1 application, Topical, Q24H  digoxin, 250 mcg, Oral, Daily  dilTIAZem, 90 mg, Oral, Q8H  furosemide, 40 mg, Intravenous, Daily  insulin detemir, 25 Units, Subcutaneous, Q12H  insulin lispro, 0-14 Units, Subcutaneous, Q6H  ipratropium, 0.5 mg, Nebulization, 4x Daily - RT  levETIRAcetam, 500 mg, Oral, BID  levoFLOXacin, 500 mg, Oral, Q24H  Linezolid, 600 mg, Intravenous, Q12H  methylnaltrexone, 12 mg, Subcutaneous, Every Other Day  metoprolol tartrate, 50 mg, Oral, Q12H  multivitamin with minerals, 1 tablet, Oral, Daily  pantoprazole, 40 mg, Intravenous, Q AM  polyethylene glycol, 17 g, Oral, Daily  sennosides-docusate, 1 tablet, Oral, Daily  sodium chloride, 10 mL, Intravenous, Q12H      Current PRN Medications:  •  acetaminophen **OR** acetaminophen **OR** acetaminophen  •  butalbital-acetaminophen-caffeine  •  calcium carbonate  •  dextrose  •  dextrose  •  glucagon (human recombinant)  •  hydrOXYzine  •  ipratropium  •  ipratropium-albuterol  •  Morphine  •  nicotine  •  ondansetron **OR** ondansetron  •  sodium chloride  •  sodium chloride    Review of Systems   Unable to perform ROS: Intubated       Objective     Vital Signs:  Temp (24hrs), Av.7 °F (37.1 °C), Min:98.2 °F  "(36.8 °C), Max:99.4 °F (37.4 °C)      /71 (BP Location: Right arm, Patient Position: Lying)   Pulse 64   Temp 98.7 °F (37.1 °C) (Axillary)   Resp 18   Ht 177.8 cm (70\")   Wt 125 kg (276 lb)   SpO2 95%   BMI 39.60 kg/m²         Physical Exam  General: Patient is a 67-year-old gentleman lying in bed in no acute distress in CCU.  Paola RN at bedside.  HEENT:   Cranial surgical incision healed well  Neck:   Trach in place and connected to ventilator.  Neuro: Patient not responsive to voice.  Psych: Patient not agitated    Line/IV site: Peripheral IV x2 left upper extremity.  Dressings clean dry and intact    Results Review:    I reviewed the patient's new clinical results.    Lab Results:  CBC:   CBC w/diff        4/29/2023    17:56 4/30/2023    08:19 5/1/2023    00:04   CBC w/Diff   WBC 13.30   13.26   12.05     RBC 3.03   3.08   3.08     Hemoglobin 9.0   9.2   9.1     Hematocrit 29.6   29.7   30.1     MCV 97.7   96.4   97.7     MCH 29.7   29.9   29.5     MCHC 30.4   31.0   30.2     RDW 14.4   14.3   14.4     Platelets 298   286   281     Neutrophil Rel %  81.8   76.8     Immature Granulocyte Rel %  1.1   1.1     Lymphocyte Rel %  7.5   12.9     Monocyte Rel %  8.7   8.1     Eosinophil Rel %  0.6   0.8     Basophil Rel %  0.3   0.3           CMP:   Lab Results   Lab 04/29/23  0642 04/30/23  0819 05/01/23  0004   SODIUM 135* 136 138   POTASSIUM 3.9 3.9 3.8   CHLORIDE 94* 92* 96*   CO2 27.0 31.0* 31.0*   BUN 44* 53* 57*   CREATININE 1.36* 1.41* 1.56*   CALCIUM 8.1* 7.8* 8.0*   BILIRUBIN 0.6 0.7 0.6   ALK PHOS 110 151* 134*   ALT (SGPT) 12 22 19   AST (SGOT) 27 38 25   GLUCOSE 194* 232* 115*       Estimated Creatinine Clearance: 61 mL/min (A) (by C-G formula based on SCr of 1.56 mg/dL (H)).    Culture Results:    Microbiology Results (last 10 days)     Procedure Component Value - Date/Time    Respiratory Culture - Aspirate, ET Suction [[id number]]  (Abnormal) Collected: 04/29/23 1954    Lab Status: " Preliminary result Specimen: Aspirate from ET Suction Updated: 05/01/23 0930     Respiratory Culture Light growth (2+) Staphylococcus aureus, MRSA     Comment:   Methicillin resistant Staphylococcus aureus, Patient may be an isolation risk.         No Normal Respiratory Asmita     Gram Stain Many (4+) WBCs per low power field      Few (2+) Epithelial cells per low power field      Rare (1+) Gram positive cocci    Blood Culture With SIMRAN - Blood, Arm, Left [[id number]]  (Normal) Collected: 04/29/23 1757    Lab Status: Preliminary result Specimen: Blood from Arm, Left Updated: 05/01/23 1830     Blood Culture No growth at 2 days    Blood Culture With SIMRAN - Blood, Hand, Right [[id number]]  (Normal) Collected: 04/29/23 1756    Lab Status: Preliminary result Specimen: Blood from Hand, Right Updated: 05/01/23 1830     Blood Culture No growth at 2 days    MRSA Screen, PCR (Inpatient) - Swab, Nares [[id number]]  (Abnormal) Collected: 04/29/23 1748    Lab Status: Final result Specimen: Swab from Nares Updated: 04/29/23 1849     MRSA PCR MRSA Detected    Narrative:      The negative predictive value of this diagnostic test is high and should only be used to consider de-escalating anti-MRSA therapy. A positive result may indicate colonization with MRSA and must be correlated clinically.    Blood Culture - Blood, Arm, Right [[id number]]  (Normal) Collected: 04/28/23 0130    Lab Status: Preliminary result Specimen: Blood from Arm, Right Updated: 05/02/23 0200     Blood Culture No growth at 4 days    Blood Culture - Blood, Arm, Right [[id number]]  (Normal) Collected: 04/28/23 0100    Lab Status: Preliminary result Specimen: Blood from Arm, Right Updated: 05/02/23 0200     Blood Culture No growth at 4 days               Radiology:     Chest x-ray reviewed and patient persists with bilateral mixed infiltrates.  Essentially unchanged.      Imaging Results (Last 72 Hours)     Procedure Component Value Units Date/Time    XR Chest 1 View  [[id number]] Collected: 05/02/23 0708     Updated: 05/02/23 0712    Narrative:      EXAMINATION:  XR CHEST 1 VW-  5/2/2023 3:35 AM CDT     HISTORY: Intubated and sedated. S06.4X0A-Epidural hemorrhage without  loss of consciousness, initial encounter; Z74.09-Other reduced mobility;  Z98.890-Other specified postprocedural states; T81.42XA-Infection  following a procedure, deep incisional surgical site, initial encounter.     COMPARISON: 05/01/2023.     TECHNIQUE: Single view AP image.     FINDINGS:  There are diffuse bilateral lung infiltrates without  significant change. There is blunting of the costophrenic angles. There  is cardiomegaly. The thoracic aorta is atheromatous. Tracheostomy tube  remains in place. No acute bony abnormality is seen.          Impression:      1. Diffuse lung infiltrates. No significant change.  2. Blunting of the costophrenic angles. Cardiomegaly.        This report was finalized on 05/02/2023 07:09 by Dr. Arian Stauffer MD.    XR Chest 1 View [[id number]] Collected: 05/01/23 0722     Updated: 05/01/23 0728    Narrative:      EXAMINATION: XR CHEST 1 VW- 5/1/2023 7:22 AM CDT     HISTORY: Intubated and sedated; S06.4X0A-Epidural hemorrhage without  loss of consciousness, initial encounter; Z74.09-Other reduced mobility;  Z98.890-Other specified postprocedural states; T81.42XA-Infection  following a procedure, deep incisional surgical site, initial encounter;  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     COMPARISON: Chest x-ray 04/30/2023 0333 hours.     The tracheostomy tube remains in satisfactory position, there is volume  loss at the lung bases as before, with central vascular crowding and  hazy bilateral lung infiltrates, centrally predominant. There appears to  be increased atelectasis at the left base. There is borderline  cardiomegaly. No pneumothorax is identified. The  osseous structures are  unremarkable.       Impression:      Increased airspace opacities at the left base may be due to  increasing atelectasis, more diffuse pulmonary infiltrates in both lungs  are stable. The tracheostomy tube remains in satisfactory position.  This report was finalized on 05/01/2023 07:25 by Dr. Elijah Grover MD.    XR Chest 1 View [[id number]] Collected: 04/30/23 0924     Updated: 04/30/23 0928    Narrative:      Frontal supine radiograph of the chest 4/30/2023 3:40 AM CDT     History: Intubated and sedated; S06.4X0A-Epidural hemorrhage without  loss of consciousness, initial encounter; Z74.09-Other reduced mobility;  Z98.890-Other specified postprocedural states; T81.42XA-Infection  following a procedure, deep incisional surgical site, initial encounter;  D32.9-Benign neoplasm of meninges, unspecified; E11.65-Type 2 diabetes  mellitus with hyperglycemia; Z79.4-Long term (current) u     Comparison: 04/29/2023      Findings:   The right IJ deep line has been removed. A tracheostomy remains in  place.. No new opacities or pneumothoraces are visualized in the chest.  The cardiomediastinal silhouette and pulmonary vascularity are  unchanged.       No acute osseous or soft tissue abnormality is noted.        Impression:      Impression:   1. Right IJ central line has been removed. Otherwise no change with  persistent diffuse airspace opacities within both lungs and effusions..        This report was finalized on 04/30/2023 09:25 by Dr. Zack Mendoza MD.    XR Chest 1 View [[id number]] Collected: 04/29/23 0905     Updated: 04/29/23 0910    Narrative:      EXAMINATION: Chest 1 view 04/29/2023     HISTORY: Intubated and sedated.     FINDINGS: Today's exam is compared to previous study one day earlier. A  tracheostomy tube remains in place as well as a right IJ deep line with  the tip at the cavoatrial juncture. There is persistent mixed  interstitial and alveolar disease within both lungs either  related to  diffuse pneumonia or pulmonary edema. Mild interval improvement in the  right lung from the previous exam. Small effusions are present blunting  the costophrenic angles.       Impression:      1.. Persistent diffuse airspace opacities. Mild improvement in the right  lung from the previous exam.  2. No interval line changes.  This report was finalized on 04/29/2023 09:07 by Dr. Zcak Mendoza MD.          Assessment & Plan     Active Hospital Problems    Diagnosis    • **Swelling of scalp    • Central apnea    • Elevated liver function tests    • COPD (chronic obstructive pulmonary disease)    • Obesity (BMI 30-39.9)    • Tobacco abuse, in remission    • Acute systolic heart failure    • ADDIE (obstructive sleep apnea)    • Type 2 myocardial infarction due to arrhythmia    • Acute pulmonary edema due to A-fib RVR    • Acute respiratory failure with hypoxia    • Intracranial epidural hematoma    • Post-operative infection    • Paroxysmal atrial fibrillation with rapid ventricular response    • Type 2 diabetes mellitus with hyperglycemia and peripheral neuropathy, with long-term current use of insulin ([facility])    • Coronary artery disease    • Meningioma s/p craniotomy        IMPRESSION:    Respiratory culture with MRSA-chest x-ray essentially unchanged.  Certainly may be colonization.  Started on linezolid May 1.  Increasing creatinine-Lasix changed from twice daily to daily on May 1 per Dr. Gupta.  BMP ordered for today  Prior cranioplasty infection with Serratia-treated with meropenem now on levofloxacin via G-tube  Status post CODE BLUE with episode of PEA  Diabetes mellitus      RECOMMENDATION:   · Continue levofloxacin for cranioplasty infection with Serratia marcescens  · Discontinued vancomycin 5/ 1 given increasing creatinine (patient did receive Vanco and Zosyn combination per pulmonary when patient had worsening chest x-ray last week)  · Continue linezolid  · Monitor tolerance of tube feedings  off Reglan  · Continue to monitor kidney function and urine output closely      Discussed with SELENA Guevara.    Discussed ADITYA Gallego MD  05/02/23  08:36 CDT

## 2023-05-02 NOTE — PROGRESS NOTES
RT EQUIPMENT DEVICE RELATED - SKIN ASSESSMENT    RT Medical Equipment/Device:     Tracheostomy    Skin Assessment:      Neck:  Intact    Device Skin Pressure Protection:  Skin-to-device areas padded:  Optifoam    Nurse Notification:  Kylie Rosenbaum RRT

## 2023-05-02 NOTE — PROGRESS NOTES
Neurosurgery Daily Progress Note    HPI:  Elijah Escobar is a 67 y.o. male with a significant medical history of hypertension, diabetes, hyperlipidemia, seizures, craniotomy for tumor (6/16/2022), craniotomy for washout (7/1/2022), craniectomy (10/4/2022), coronary artery disease, diabetes, erectile dysfunction, GERD, hypertension, hyperlipidemia, tobacco abuse, and obesity.  He presents today with a complaint of a 4-5 days onset of progressively worsening right frontal, temporal, and periorbital edema and associated symptoms to include, but not limited to generalized fatigue, chills, dyspnea, intermittent nausea without vomiting, and states he's felt feverish.  Physical exam findings of neurologically intact with right frontal, temporal, and periorbital swelling.  WBC elevated at 15.  3 to an CRP elevated at 14.75.  Imaging pending.    Assessment:   Past Medical History:   Diagnosis Date   • Brain tumor    • Coronary artery disease    • COVID-19 vaccine series completed     MODERNA X 3; LAST DOSE 3/2022   • Diabetes    • Erectile dysfunction    • GERD (gastroesophageal reflux disease)    • Hypercholesteremia    • Hypertension    • Seizure      Active Hospital Problems    Diagnosis    • **Swelling of scalp    • Central apnea    • Elevated liver function tests    • COPD (chronic obstructive pulmonary disease)    • Obesity (BMI 30-39.9)    • Tobacco abuse, in remission    • Acute systolic heart failure    • ADDIE (obstructive sleep apnea)    • Type 2 myocardial infarction due to arrhythmia    • Acute pulmonary edema due to A-fib RVR    • Acute respiratory failure with hypoxia    • Intracranial epidural hematoma    • Post-operative infection    • Paroxysmal atrial fibrillation with rapid ventricular response    • Type 2 diabetes mellitus with hyperglycemia and peripheral neuropathy, with long-term current use of insulin (HCC)    • Coronary artery disease    • Meningioma s/p craniotomy      Plan:   Neuro: No change.   Does not open eyes to voice.  Does not follow commands.  Minimal motor response to painful stimuli.  + gag and cough.     Infected cranial flap      POD #25 (4/4/2023)  craniectomy and washout    Postop code CT and MRI stable. No acute abnormalities.    Flap tapped I&D cultures 4+ gram-negative bacilli    Heavy 4+ Serratia marcescens     POD #18 (4/13/2023) evacuation of scalp hematoma    JUAN removed     Continue neuro exams per policy.  Call for decline  EEG shows no epileptiform activity or ongoing seizures    7 Days Post-Op (4/25/2023) tracheostomy and PEG tube placement.    Appreciate ENT and general surgery    CV: Code for PEA, 4/16/2023.  Required epinephrine and bicarb   AFib/flutter, rate controlled.   Hold all anticoagulation for 2 weeks.   No evidence of DVT or PE per imaging   Appreciate cardiology  Pulm: Ventilation per trach   Increased pulmonary edema per CXR.    : DC Jeong catheter  FEN: Enteral feeding per PEG tube  Endocrine: Continue SSI  GI: SHERIF.  +BM  ID:  Infected cranial flap  CSF: no growth to date   PNA.  Resolved   UTI.  Resolved   MRSA +   Antibiotics per ID currently on Levaquin and Zyvox  Heme:  DVT prophylaxis with SCDs.  Hold all anticoagulation for 2 weeks.  Pain: No complaints at present  Dispo: PT/OT   Family has elected proceed with LTAC placement.  LTAC consulted     Chief complaint:   4-5 days onset of progressively worsening right frontal, temporal, and periorbital edema and associated symptoms to include, but not limited to generalized fatigue, chills, dyspnea, intermittent nausea without vomiting, and states he's felt feverish.    HPI  Subjective:  Symptoms:  Stable.      Temp:  [98.2 °F (36.8 °C)-99.4 °F (37.4 °C)] 98.7 °F (37.1 °C)  Heart Rate:  [61-72] 64  Resp:  [18-24] 18  BP: (109-145)/(62-71) 125/71  FiO2 (%):  [40 %-50 %] 50 %    Output by Drain (mL) 05/01/23 0701 - 05/01/23 1900 05/01/23 1901 - 05/02/23 0700 05/02/23 0701 - 05/02/23 0902 Range Total   Requested LDAs  "do not have output data documented.     Objective:  Vital signs: (most recent): Blood pressure 125/71, pulse 64, temperature 98.7 °F (37.1 °C), temperature source Axillary, resp. rate 18, height 177.8 cm (70\"), weight 125 kg (276 lb), SpO2 95 %.      Neurologic Exam     Mental Status     Does not open eyes to voice.  Does not follow commands.       Cranial Nerves     CN III, IV, VI   Pupils are equal, round, and reactive to light.    CN VII   Right facial weakness: none  Left facial weakness: none    CN IX, X   Right gag reflex: normal  Left gag reflex: normal    Motor Exam   Minimal motor response to pain to painful stimuli.       Drains: * No LDAs found *    Imaging Results (Last 24 Hours)     Procedure Component Value Units Date/Time    XR Chest 1 View [[id number]] Collected: 05/02/23 0708     Updated: 05/02/23 0712    Narrative:      EXAMINATION:  XR CHEST 1 VW-  5/2/2023 3:35 AM CDT     HISTORY: Intubated and sedated. S06.4X0A-Epidural hemorrhage without  loss of consciousness, initial encounter; Z74.09-Other reduced mobility;  Z98.890-Other specified postprocedural states; T81.42XA-Infection  following a procedure, deep incisional surgical site, initial encounter.     COMPARISON: 05/01/2023.     TECHNIQUE: Single view AP image.     FINDINGS:  There are diffuse bilateral lung infiltrates without  significant change. There is blunting of the costophrenic angles. There  is cardiomegaly. The thoracic aorta is atheromatous. Tracheostomy tube  remains in place. No acute bony abnormality is seen.          Impression:      1. Diffuse lung infiltrates. No significant change.  2. Blunting of the costophrenic angles. Cardiomegaly.        This report was finalized on 05/02/2023 07:09 by Dr. Arian Stauffer MD.        Lab Results (last 24 hours)     Procedure Component Value Units Date/Time    Basic Metabolic Panel [[id number]] Collected: 05/02/23 0852    Specimen: Blood Updated: 05/02/23 0857    POC Glucose Once [[id number]]  " (Abnormal) Collected: 05/02/23 0453    Specimen: Blood Updated: 05/02/23 0504     Glucose 169 mg/dL      Comment: : [id number] Jalil Stubbs ID: [id number]       Blood Gas, Arterial - [[id number]]  (Abnormal) Collected: 05/02/23 0415    Specimen: Arterial Blood Updated: 05/02/23 0414     Site Right Radial     Marek's Test Positive     pH, Arterial 7.556 pH units      Comment: 83 Value above reference range        pCO2, Arterial 41.6 mm Hg      pO2, Arterial 74.3 mm Hg      Comment: 84 Value below reference range        HCO3, Arterial 36.8 mmol/L      Comment: 83 Value above reference range        Base Excess, Arterial 13.3 mmol/L      Comment: 83 Value above reference range        O2 Saturation, Arterial 96.2 %      Temperature 37.0 C      Barometric Pressure for Blood Gas 743 mmHg      Modality Ventilator     FIO2 50 %      Ventilator Mode AC     Set Tidal Volume 550     Set Mech Resp Rate 12.0     PEEP 5.0     Collected by LOBO     Comment: Meter: [id number]     :  LOBO        pCO2, Temperature Corrected 41.6 mm Hg      pH, Temp Corrected 7.556 pH Units      pO2, Temperature Corrected 74.3 mm Hg     Blood Culture - Blood, Arm, Right [[id number]]  (Normal) Collected: 04/28/23 0130    Specimen: Blood from Arm, Right Updated: 05/02/23 0200     Blood Culture No growth at 4 days    Blood Culture - Blood, Arm, Right [[id number]]  (Normal) Collected: 04/28/23 0100    Specimen: Blood from Arm, Right Updated: 05/02/23 0200     Blood Culture No growth at 4 days    POC Glucose Once [[id number]]  (Abnormal) Collected: 05/01/23 2339    Specimen: Blood Updated: 05/01/23 2350     Glucose 194 mg/dL      Comment: : [id number] Jalil Stubbs ID: [id number]       Blood Culture With SIMRAN - Blood, Hand, Right [[id number]]  (Normal) Collected: 04/29/23 1756    Specimen: Blood from Hand, Right Updated: 05/01/23 1830     Blood Culture No growth at 2 days    Blood Culture With SIMRAN - Blood, Arm, Left  [[id number]]  (Normal) Collected: 04/29/23 1757    Specimen: Blood from Arm, Left Updated: 05/01/23 1830     Blood Culture No growth at 2 days    POC Glucose Once [[id number]]  (Abnormal) Collected: 05/01/23 1744    Specimen: Blood Updated: 05/01/23 1755     Glucose 149 mg/dL      Comment: : ema StarkbeniMeter ID: [id number]       POC Glucose Once [[id number]]  (Abnormal) Collected: 05/01/23 1116    Specimen: Blood Updated: 05/01/23 1127     Glucose 173 mg/dL      Comment: : leilani Jara SergeyMetdaniel ID: [id number]       Respiratory Culture - Aspirate, ET Suction [[id number]]  (Abnormal) Collected: 04/29/23 1954    Specimen: Aspirate from ET Suction Updated: 05/01/23 0930     Respiratory Culture Light growth (2+) Staphylococcus aureus, MRSA     Comment:   Methicillin resistant Staphylococcus aureus, Patient may be an isolation risk.         No Normal Respiratory Asmita     Gram Stain Many (4+) WBCs per low power field      Few (2+) Epithelial cells per low power field      Rare (1+) Gram positive cocci        Rahul Arnold, APRN

## 2023-05-03 PROBLEM — R79.89 ELEVATED LIVER FUNCTION TESTS: Status: RESOLVED | Noted: 2023-04-17 | Resolved: 2023-05-03

## 2023-05-03 PROBLEM — I49.01 VENTRICULAR FIBRILLATION: Status: ACTIVE | Noted: 2023-05-03

## 2023-05-03 PROBLEM — I46.9 PEA (PULSELESS ELECTRICAL ACTIVITY): Status: ACTIVE | Noted: 2023-01-01

## 2023-05-03 NOTE — PROGRESS NOTES
[facility] PULMONARY AND CRITICAL CARE PROGRESS NOTE - [facility]    Patient: Elijah Escobar    1955    MR# [id number]    Acct# [id number]  05/03/23   06:18 CDT  Referring Provider: Flaco Portillo, *    Chief Complaint: Mechanically ventilated    Interval history: The patient remains intubated with trach to [facility] vent. Precedex infusing. Nursing indicates he is more alert and follows some commands. He opened his eyes to my voice and moved his right toes to command. O2 sat 97% on 5 PEEP, 0.30 FiO2. He is assisting the vent with RR of 19. AM ABG and CXR reviewed. Tmax 98.4.  No ventilator changes. No other aggravating or alleviating factors. Waiting LTACH placement.    Meds:  aspirin, 81 mg, Oral, Daily  atorvastatin, 20 mg, Oral, Nightly  budesonide, 0.5 mg, Nebulization, BID - RT  Chlorhexidine Gluconate Cloth, 1 application, Topical, Q24H  digoxin, 250 mcg, Oral, Daily  dilTIAZem, 90 mg, Oral, Q8H  furosemide, 40 mg, Intravenous, Daily  insulin detemir, 25 Units, Subcutaneous, Q12H  insulin lispro, 0-14 Units, Subcutaneous, Q6H  ipratropium, 0.5 mg, Nebulization, 4x Daily - RT  levETIRAcetam, 500 mg, Oral, BID  levoFLOXacin, 500 mg, Oral, Q24H  Linezolid, 600 mg, Intravenous, Q12H  methylnaltrexone, 12 mg, Subcutaneous, Every Other Day  metoprolol tartrate, 50 mg, Oral, Q12H  multivitamin with minerals, 1 tablet, Oral, Daily  pantoprazole, 40 mg, Intravenous, Q AM  polyethylene glycol, 17 g, Oral, Daily  sennosides-docusate, 1 tablet, Oral, Daily  sodium chloride, 10 mL, Intravenous, Q12H      dexmedetomidine, 0.2-1.5 mcg/kg/hr, Last Rate: 0.4 mcg/kg/hr (05/03/23 0559)  propofol, 5-50 mcg/kg/min, Last Rate: Stopped (04/30/23 1130)        Ventilator Settings:        Resp Rate (Set): 12  Pressure Support (cm H2O): 10 cm H20  FiO2 (%): 30 %  PEEP/CPAP (cm H2O): 5 cm H20  Minute Ventilation (L/min) (Obs): 9.97 L/min  Resp Rate (Observed) Vent: 20  I:E Ratio (Set): 1:4.45  I:E Ratio (Obs):  1:1.7  PIP Observed (cm H2O): 24 cm H2O  RSBI: 0  Physical Exam:  Temp:  [97.8 °F (36.6 °C)-98.7 °F (37.1 °C)] 97.8 °F (36.6 °C)  Heart Rate:  [59-69] 60  Resp:  [13-27] 27  BP: (110-165)/(62-95) 149/74  FiO2 (%):  [30 %] 30 %    Intake/Output Summary (Last 24 hours) at 5/3/2023 0618  Last data filed at 5/3/2023 0517  Gross per 24 hour   Intake 3304.19 ml   Output 1575 ml   Net 1729.19 ml     SpO2 Percentage    05/03/23 0200 05/03/23 0400 05/03/23 0600   SpO2: 94% 94% 94%   Body mass index is 39.85 kg/m².   Physical Exam   Constitutional:       Appearance: He is obese. He is ill-appearing.  Intubated, restrained.  No ventilator dyssynchrony.  HENT:      Head: Normocephalic. Right scalp wound, well-healed, no redness warmth or drainage     Nose: Nose normal.      Mouth/Throat: trach/TF  Eyes:      General:         Right eye: No discharge.         Left eye: No discharge.   Cardiovascular:      Rate: normal rate.   Pulmonary:      Effort: increased work of breathing      Breath sounds: No wheezing.  Diminished in bases  Abdominal:      General: PEG tube, Abdomen is distended.      Palpations: Abdomen is soft.    Musculoskeletal:      General: Bilateral wrist restraints, SCDs      Right lower leg: Edema      Left lower leg: Edema   Skin:     General: Skin is warm and dry.      Coloration: Skin is not jaundiced or pale.   Neurological:      General: Intubated      Electronically signed by ADITYA Ferrell, 5/3/2023, 06:18 CDT      Physician substantive portion: medical decision making  Result Review  Laboratory Data:  Results from last 7 days   Lab Units 05/03/23  0800 05/01/23  0004 04/30/23  0819   WBC 10*3/mm3 17.06* 12.05* 13.26*   HEMOGLOBIN g/dL 10.1* 9.1* 9.2*   PLATELETS 10*3/mm3 253 281 286     Results from last 7 days   Lab Units 05/03/23  0800 05/03/23  0755 05/02/23  0852 05/01/23  0004   SODIUM mmol/L 139  --  136 138   POTASSIUM mmol/L 3.4*  --  3.7 3.8   CO2 mmol/L 30.0*  --  30.0* 31.0*   BUN  mg/dL 50*  --  56* 57*   CREATININE mg/dL 1.34*  --  1.41* 1.56*   LACTATE mmol/L  --  6.9*  --   --    CRP mg/dL  --   --   --  12.72*     Results from last 7 days   Lab Units 05/03/23  0851 05/03/23  0356 05/02/23  0415   PH, ARTERIAL pH units 7.181* 7.559* 7.556*   PCO2, ARTERIAL mm Hg 59.8* 41.0 41.6   PO2 ART mm Hg 59.0* 55.2* 74.3*   FIO2 % 100 30 50     Microbiology Results (last 10 days)     Procedure Component Value - Date/Time    Respiratory Culture - Aspirate, ET Suction [[id number]]  (Abnormal)  (Susceptibility) Collected: 04/29/23 1954    Lab Status: Final result Specimen: Aspirate from ET Suction Updated: 05/02/23 0902     Respiratory Culture Light growth (2+) Staphylococcus aureus, MRSA     Comment:   Methicillin resistant Staphylococcus aureus, Patient may be an isolation risk.         No Normal Respiratory Asmita     Gram Stain Many (4+) WBCs per low power field      Few (2+) Epithelial cells per low power field      Rare (1+) Gram positive cocci    Susceptibility      Staphylococcus aureus, MRSA      STALIN      Clindamycin Resistant     Inducible Clindamycin Resistance Negative      Linezolid Susceptible      Oxacillin Resistant     Tetracycline Resistant     Trimethoprim + Sulfamethoxazole Resistant     Vancomycin Susceptible                           Blood Culture With SIMRAN - Blood, Arm, Left [[id number]]  (Normal) Collected: 04/29/23 1757    Lab Status: Preliminary result Specimen: Blood from Arm, Left Updated: 05/02/23 1831     Blood Culture No growth at 3 days    Blood Culture With SIMRAN - Blood, Hand, Right [[id number]]  (Normal) Collected: 04/29/23 1756    Lab Status: Preliminary result Specimen: Blood from Hand, Right Updated: 05/02/23 1831     Blood Culture No growth at 3 days    MRSA Screen, PCR (Inpatient) - Swab, Nares [[id number]]  (Abnormal) Collected: 04/29/23 1748    Lab Status: Final result Specimen: Swab from Nares Updated: 04/29/23 1849     MRSA PCR MRSA Detected    Narrative:      The  negative predictive value of this diagnostic test is high and should only be used to consider de-escalating anti-MRSA therapy. A positive result may indicate colonization with MRSA and must be correlated clinically.    Blood Culture - Blood, Arm, Right [[id number]]  (Normal) Collected: 04/28/23 0130    Lab Status: Final result Specimen: Blood from Arm, Right Updated: 05/03/23 0200     Blood Culture No growth at 5 days    Blood Culture - Blood, Arm, Right [[id number]]  (Normal) Collected: 04/28/23 0100    Lab Status: Final result Specimen: Blood from Arm, Right Updated: 05/03/23 0200     Blood Culture No growth at 5 days         Recent films:  XR Chest 1 View    Result Date: 5/3/2023  EXAMINATION: XR CHEST 1 VW- 5/3/2023 7:29 AM CDT  HISTORY: Intubated and sedated; S06.4X0A-Epidural hemorrhage without loss of consciousness, initial encounter; Z74.09-Other reduced mobility; Z98.890-Other specified postprocedural states; T81.42XA-Infection following a procedure, deep incisional surgical site, initial encounter; 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  COMPARISON: Chest x-ray 05/02/2023 0308 hours.  The tracheostomy tube appears in satisfactory position as before. There is respiratory motion artifact, moderate volume loss is present in the lung bases and there are are extensive bilateral pulmonary infiltrates, centrally predominant and slightly asymmetric and greater on the right. Mild cardiomegaly is present. No pneumothorax or large pleural effusion is identified. The osseous structures are unremarkable.      Impression: CHF with mild improvement in the extent of pulmonary edema within the left lung. Stable satisfactory position of the tracheostomy tube. This report was finalized on 05/03/2023 07:32 by Dr. Elijah Grover MD.    XR Chest 1 View    Result Date: 5/2/2023  EXAMINATION:  XR CHEST 1 VW-  5/2/2023  3:35 AM CDT  HISTORY: Intubated and sedated. S06.4X0A-Epidural hemorrhage without loss of consciousness, initial encounter; Z74.09-Other reduced mobility; Z98.890-Other specified postprocedural states; T81.42XA-Infection following a procedure, deep incisional surgical site, initial encounter.  COMPARISON: 05/01/2023.  TECHNIQUE: Single view AP image.  FINDINGS:  There are diffuse bilateral lung infiltrates without significant change. There is blunting of the costophrenic angles. There is cardiomegaly. The thoracic aorta is atheromatous. Tracheostomy tube remains in place. No acute bony abnormality is seen.       Impression: 1. Diffuse lung infiltrates. No significant change. 2. Blunting of the costophrenic angles. Cardiomegaly.   This report was finalized on 05/02/2023 07:09 by Dr. Arian Stauffer MD.    Adult Transthoracic Echo Limited W/ Cont if Necessary Per Protocol    Result Date: 5/3/2023  •  Left ventricular systolic function is severely decreased. Left ventricular ejection fraction appears to be 21 - 25%. •  Moderately reduced right ventricular systolic function noted.      Personal review of imaging: Reviewed current imaging studies and agree with current interpretation.      Pulmonary Assessment:  1. Acute on chronic hypoxic respiratory failure on mechanical ventilation orally intubated  2. Chronic congestive diastolic heart failure  3. Ventricular fibrillation with cardiac arrest s/p defibrillation shock and resuscitation this morning with ROSC  4. Pulmonary infiltrate combination of pneumonia and fluid overload  5. Atrial fibrillation rate well controlled now  6. COPD with history of tobacco abuse  7. Meningioma s/p craniotomy, cranioplasty and flap infection.    8. Sepsis on multiple antibiotics/MRSA positive nasal swab  9. Acute kidney injury  10. Encephalopathy  11. Hypertension  12. Diabetes mellitus insulin-dependent  13. Coronary artery disease  14. Obesity likely obstructive sleep  apnea  15. Seizure disorder  16. Chronic pain       Recommend/plan:   · Patient was seen in the follow-up visit in pulmonary rounds in CCU today.  · Chart reviewed current events noted.  His condition is worsened today.   · This morning patient had cardiac arrest this morning with ventricular fibrillation and had a CODE BLUE.    · He had been shocked few times and resuscitated.  He remains critical at this moment and is ventilator dependent  · The time of my visit he is doing poorly and his blood pressure is trending down and oxygen saturation is down to 80s with PEEP of 10 and FiO2 100%.  · Hospitalist team was reconsulted.  A central line is placed by Dr. Moore in the right groin.   · Patient was seen by palliative care team.  Patient's son and other family members was present in the room and patient may go for terminal extubation and withdrawal if family is agreeable.  · Patient's condition is getting worse and echocardiogram shows worsening cardiac function with cardiomyopathy and low ejection fraction.  He was initially plan for going to the LTAC after trach placement but it seems unlikely at this moment  · Ventilator settings assist control/volume control ventilation, tidal volume 550, rate 16, PEEP of 5, FiO2 100%.  Continues current settings  · Bronchodilator treatment routine respiratory care.  Supportive care.  Pulmonary toilet  · Continue current antibiotic coverage with Zyvox.  He was also getting Lasix which has been decreased due to worsening renal function  · ENT managing tracheostomy and there is some cuff problem.  · Continue DVT and stress ulcer prophylaxis and pain and anxiety control  · He is essentially unresponsive and sedation could be discontinued.  · Nutritional support.  Labs and imaging studies as needed  · Patient most likely will be going for comfort measures but we have to wait for family's decision  · CODE STATUS: Still a full code.  Pending change of CODE STATUS after palliative  care consult  · Overall prognosis: Guarded to poor.  · We will follow    This visit was performed by both a physician and an [facility] RN.  I personally evaluated and examined the patient.  I performed all aspects of the medical decision making as documented.    Electronically signed by     Tracy Gupta MD,  Pulmonologist/Intensivist   5/3/2023, 12:32 CDT

## 2023-05-03 NOTE — PROGRESS NOTES
RT EQUIPMENT DEVICE RELATED - SKIN ASSESSMENT    RT Medical Equipment/Device:     Tracheostomy    Skin Assessment:      Neck:  Intact    Device Skin Pressure Protection:  Skin-to-device areas padded:  Optifoam    Nurse Notification:  Kylie Condon, CRT

## 2023-05-03 NOTE — PLAN OF CARE
Goal Outcome Evaluation:  Plan of Care Reviewed With: patient        Progress: improving  Outcome Evaluation: Pt has become more alert.  He is moving toes on R foot voluntarily and has moved R arm voluntarily.  He appears to follow some commands at times.  He does become very restless, grimaces, and moans.  He was treated for pain with PRN morphine 2x this shift with good response.  Precedex continues. Afebrile.  Tolerating TF with no residual noted.

## 2023-05-03 NOTE — PROGRESS NOTES
Infectious Diseases Progress Note    Patient:  Elijah Escobar  YOB: 1955  MRN: [id number]   Admit date: 3/31/2023   Admitting Physician: Flaco Portillo, *  Primary Care Physician: Brianna Martinez APRN    Chief Complaint/Interval History: He had cardiac arrest this morning.  Nurse indicates he was in atrial fibrillation, then ventricular tachycardia, then ventricular fibrillation.  He was resuscitated.  Remains on vent.  FiO2 at 100% currently with saturation at 100%.  He opens eyes.  He was not tracking with eyes.  He has been without fever.  Heart rate and blood pressure stable currently.    Intake/Output Summary (Last 24 hours) at 5/3/2023 1531  Last data filed at 5/3/2023 1202  Gross per 24 hour   Intake 2403.19 ml   Output 1150 ml   Net 1253.19 ml     Allergies: No Known Allergies  Current Scheduled Medications:   aspirin, 81 mg, Oral, Daily  atorvastatin, 20 mg, Oral, Nightly  budesonide, 0.5 mg, Nebulization, BID - RT  Chlorhexidine Gluconate Cloth, 1 application, Topical, Q24H  digoxin, 250 mcg, Oral, Daily  dilTIAZem, 90 mg, Oral, Q8H  furosemide, 40 mg, Intravenous, Daily  heparin (porcine), 5,000 Units, Subcutaneous, Q12H  insulin detemir, 25 Units, Subcutaneous, Q12H  insulin lispro, 0-14 Units, Subcutaneous, Q6H  ipratropium, 0.5 mg, Nebulization, 4x Daily - RT  levETIRAcetam, 500 mg, Oral, BID  levoFLOXacin, 500 mg, Oral, Q24H  Linezolid, 600 mg, Intravenous, Q12H  methylnaltrexone, 12 mg, Subcutaneous, Every Other Day  metoprolol tartrate, 50 mg, Oral, Q12H  multivitamin with minerals, 1 tablet, Oral, Daily  pantoprazole, 40 mg, Intravenous, Q AM  polyethylene glycol, 17 g, Oral, Daily  sennosides-docusate, 1 tablet, Oral, Daily  sodium chloride, 10 mL, Intravenous, Q12H      Current PRN Medications:  •  acetaminophen **OR** acetaminophen **OR** acetaminophen  •  butalbital-acetaminophen-caffeine  •  calcium carbonate  •  dextrose  •  dextrose  •  glucagon (human  "recombinant)  •  hydrOXYzine  •  ipratropium  •  ipratropium-albuterol  •  Morphine  •  nicotine  •  ondansetron **OR** ondansetron  •  sodium chloride  •  sodium chloride    Review of Systems unobtainable from patient due to mental status    Vital Signs:  /76   Pulse 98   Temp 98.5 °F (36.9 °C) (Axillary)   Resp 28   Ht 177.8 cm (70\")   Wt 126 kg (277 lb 11.2 oz)   SpO2 100%   BMI 39.85 kg/m²     Physical Exam  Vital signs - reviewed.  Line/IV site - No erythema, warmth, induration, or tenderness.  No wheezing  Skin without rash  Abdomen with bowel sounds present    Lab Results:  CBC:   Results from last 7 days   Lab Units 05/03/23  0800 05/01/23  0004 04/30/23  0819 04/29/23  1756   WBC 10*3/mm3 17.06* 12.05* 13.26* 13.30*   HEMOGLOBIN g/dL 10.1* 9.1* 9.2* 9.0*   HEMATOCRIT % 33.5* 30.1* 29.7* 29.6*   PLATELETS 10*3/mm3 253 281 286 298     BMP:  Results from last 7 days   Lab Units 05/03/23  0800 05/02/23  0852 05/01/23  0004 04/30/23  0819 04/29/23  0642 04/28/23  0014 04/27/23  0223   SODIUM mmol/L 139 136 138 136 135* 138 141   POTASSIUM mmol/L 3.4* 3.7 3.8 3.9 3.9 4.2 3.5   CHLORIDE mmol/L 94* 94* 96* 92* 94* 95* 100   CO2 mmol/L 30.0* 30.0* 31.0* 31.0* 27.0 30.0* 33.0*   BUN mg/dL 50* 56* 57* 53* 44* 29* 29*   CREATININE mg/dL 1.34* 1.41* 1.56* 1.41* 1.36* 1.25 1.07   GLUCOSE mg/dL 212* 148* 115* 232* 194* 196* 190*   CALCIUM mg/dL 8.1* 7.8* 8.0* 7.8* 8.1* 8.3* 8.1*   ALT (SGPT) U/L 16  --  19 22 12 15 12     Culture Results:   Blood Culture   Date Value Ref Range Status   04/29/2023 No growth at 3 days  Preliminary   04/29/2023 No growth at 3 days  Preliminary   04/28/2023 No growth at 5 days  Final   04/28/2023 No growth at 5 days  Final     Respiratory Culture   Date Value Ref Range Status   04/29/2023 Light growth (2+) Staphylococcus aureus, MRSA (A)  Final     Comment:       Methicillin resistant Staphylococcus aureus, Patient may be an isolation risk.   04/29/2023 No Normal Respiratory " Asmita (A)  Final     Radiology: CT angiogram of the chest pending    Additional Studies Reviewed:   Twelve-lead EKG today:  Test Reason : Drug Monitoring  Blood Pressure :   */*   mmHG  Vent. Rate :  88 BPM     Atrial Rate :  88 BPM     P-R Int : 196 ms          QRS Dur :  92 ms      QT Int : 324 ms       P-R-T Axes :  36 -45 119 degrees     QTc Int : 392 ms     Sinus rhythm with sinus arrhythmia with frequent Premature ventricular complexes  Left axis deviation  Incomplete right bundle branch block  Inferior infarct , age undetermined  Cannot rule out Anterior infarct (cited on or before 05-APR-2023)  Abnormal ECG  When compared with ECG of 05-APR-2023 09:40,  Significant changes have occurred    2D echocardiogram today:  Echocardiogram Findings    Left Ventricle Left ventricular systolic function is severely decreased. Left ventricular ejection fraction appears to be 21 - 25%.   Right Ventricle Moderately reduced right ventricular systolic function noted.   Pericardium The pericardium is normal. There is no evidence of pericardial effusion. .       Impression:   1.  Previous cranioplasty infection with Serratia-completed IV antibiotic treatment.  He had been on treatment levofloxacin.  Current QT not elevated on EKG, but may be best at this point to simplify antibiotic treatment given his arrhythmia.  2.  Cardiac arrest this morning-V-fib requiring resuscitation  3.  Systolic congestive heart failure-EF on today's echo 21 to 25%  4.  Renal insufficiency-have been trending toward improvement  5.  Diabetes mellitus  6.  Prior meningioma resection    Recommendations:   He has been on extended course of antibiotic treatment  Going to simplify medication treatment and discontinue antibiotics at this point  Follow temperature curve, respiratory secretions, oxygenation, and overall clinical course and will reintroduce if concerns for infection  Otherwise feel reasonable to follow off antimicrobial therapy at this  time    Edwin Jeffers MD

## 2023-05-03 NOTE — SIGNIFICANT NOTE
05/03/23 0853   Readings   Plateau Pressure (cm H2O) 18 cm H2O   Driving Pressure (cm H2O) 13 cm H2O   Static Compliance (L/cm H2O) 45

## 2023-05-03 NOTE — CONSULTS
[facility] Palliative Care Services  Initial Consult    Attending Physician: Flaco Portillo MD  Referring Provider: Flaco Portillo MD    Patient Name: Elijah Escobar  Date of Admission: 3/31/2023  Today's Date: 05/03/23     Reason for Referral: Goals of Care/Advance Care Planning    Code Status and Medical Interventions:   Ordered at: 04/25/23 1543     Level Of Support Discussed With:    Health Care Surrogate    Next of Kin (If No Surrogate)     Code Status (Patient has no pulse and is not breathing):    CPR (Attempt to Resuscitate)     Medical Interventions (Patient has pulse or is breathing):    Full Support     Release to patient:    Routine Release        Subjective     HPI: 67 y.o. male with past medical history including coronary artery disease, diabetes, GERD, hypercholesterolemia, hypertension, meningioma and seizure.  Additional past medical history listed below.  According to chart review he underwent right frontal craniotomy for removal of WHO grade I meningioma on 6/16/2022 and did well initially postoperatively however presented to [facility] on 6/30/2022 with complaints of persistent right-sided headache and dizziness and was found to have intracranial infection.  He underwent incision/drainage, washout and craniotomy on 7/1/2022.  Chart review reveals he returned to OR to have cranioplasty on 10/4/2022 that was noted to be uneventful.  Noted to have outpatient follow-up with neurosurgery on 3/31/2023 where he had reported progressively worsening right frontal, temporal and periorbital edema associated with symptoms including generalized fatigue, chills, dyspnea and intermittent nausea without vomiting.  He was then admitted to the hospital for further work-up and treatment.  Initial inpatient work-up revealed few abnormalities in labs including elevated CRP, sed rate and WBC.  Chest x-ray with increasing interstitial densities.  He underwent lumbar puncture  on 3/30/2023 which was noted to have increase in CSF glucose and protein however CSF culture revealed no growth.  CT of head completed on 3/30/2023 revealed postsurgical changes with right frontal scalp fluid collection and a small of right frontal ventricular cranial hemorrhage however no midline shift or large intracranial hematoma noted.  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  Radiology reports noted limited exam as patient was unable to tolerate part of study.  According to chart review he was noted to be in atrial fibrillation with RVR and hospitalist was consulted.  He was also noted to have decline in respiratory status and required Vapotherm support and was transferred to intensive care unit for closer monitoring.  He underwent forehead aspiration on 4/1/2023 and wound culture revealed 4+ growth of Serratia marcescens and was started on broad-spectrum antibiotics.  Echocardiogram completed on 4/1/2023 which revealed left ventricular systolic function mildly decreased with LVEF 46 to 50%, left ventricular wall thickness consistent with mild to moderate concentric hypertrophy, multiple left ventricular wall segments hypokinetic and mildly elevated right ventricular systolic pressure from tricuspid regurgitation.  Cardiology was consulted and made recommendations regarding atrial fibrillation and recommended outpatient ischemic work-up.  Pulmonology was also consulted and made further recommendations.  He underwent cranioplasty with removal of right horseshoe with Dr. Portillo on 4/4/2023.  Infectious disease was consulted and made additional recommendations regarding antibiotic coverage.  He was noted plans to discharge to LTAC however insurance  declined on 4/10/2023 per SW note and SNF referrals made.  He was transferred to the medical floor on 4/12/2023 per chart review.  CT of head completed on 4/12/2023 revealed new 10 x 3.1 cm heterogeneous superficial mass suggestive of hematoma with mass effect on right frontal lobe and a right to left 4 mm midline shift, mild mass effect on anterior horn right lateral ventricle and mild right parietal lobe sulcal effacement.  He underwent craniotomy with evacuation of right hematoma on 4/12/2023 with Dr. Portillo.  According to chart review he began developing confusion with hypoxia with increased oxygen demands and was transferred back to intensive care unit on 4/15/2023.  Chart review also notes increased agitation and aggressiveness on 4/16/2023 in which he required multiple medications to calm him however he developed loss of respirations and became bradycardic and CODE BLUE initiated and he required intubation.  He was unable to liberate from ventilator and required tracheostomy and PEG tube placement on 4/25/2023.  Palliative care was initially consulted on 4/25/2023 per hospitalist to discuss goals of care however attending requested to hold consult.  According to chart review he developed ventricular flutter/fibrillation and did not have a pulse and required compressions, epinephrine  and defibrillation.  Labs collected this morning post cardiac event revealed HS troponin elevated at 766, BNP 18,190, creatinine 1.34, BUN 50, GFR 58.1, alkaline phosphatase 151, albumin 2.5, lactate 6.9, WBC 17.06, magnesium 4.0, hemoglobin 10.1 and hematocrit 33.5.  Bedside echocardiogram has been completed and results pending.  ABGs obtained postcardiac event revealed pH 7.181, PCO2 59.8 and PO2 59.0.  Cardiac electrophysiologist has been consulted due to chronic atrial fibrillation, prior PE and V-fib arrest. Palliative care has also been consulted this morning by attending to discuss goals of care.  He is lying in bed,  "remains intubated and sedated.  His sister, Radha and son, Cathy, is present at bedside.     Advance Care Planning   Advanced Directives: No advance directive on file.     Advance Care Planning Discussion: Mr. Escobar is intubated, sedated and unable to demonstrate ability to make complex medical decisions at this time.  Spoke with patient's son, Cathy, and patient's sister, Radha, at bedside regarding goals of care.  Family reports patient is  however she is deaf and unable to communicate via telephone.  Reports they have been discussing with providers/treatment team and relaying to patient's spouse by writing information down for her to read.  Cathy reports Mr. Escobar has two total children however his brother hasn't been in contact with his father before or during hospitalization.  Discussed events during hospitalization as well as Mr. Escobar's baseline with family at bedside.  Explored previous discussions with providers regarding prognosis.  We discussed overall poor prognosis given complications during hospitalization including multiple cardiac events.  We also discussed decline in neurologic condition which will also impact ability to recover to baseline.  Patient's son expressed he likes to go for rides, go fishing and spend time with family.  Expressed concerns that he may not able to do things as he once was able.  Family appear to have good understanding.  We discussed treatment options at length including continuing current measures, de-escalating CODE STATUS to include no CPR and/or cardioversion/defibrillation if additional cardiac event occurs versus transitioning to comfort focused measures and focusing on symptoms and quality of life only.  Cathy made the comment \"This is no life.\"  Explored whether Mr. Escobar had previously discussed his wishes in regards to medical priorities with family.  Family declined stating he never talked about his wishes as he was \"scared of dying.\"  Support " "provided.  Encouraged them to discuss and think of what Mr. Escobar wishes would be if he were able to sit with them at this time and look at himself in current condition.  Offered to call additional family members as well.  Sister requested call be placed to patient's other sister, Brina.  Call placed to Brina while in room to have further family conference.  Brina expressed she understands that her brother is not in great condition and has a lot going against him however states \"I cannot tell you to give up because that is God's choice and not mine.\"  Acknowledged concerns.  We discussed additional treatment options as well with Brina including option of continuing current measures with exception of not performing CPR and/or cardioversion/defibrillation.  She stated \"If it is up to me then I want you to continue doing everything to keep him alive.\"  Explained again quality versus quantity.  Brina denied any questions and/or concerns.  Explained to family at bedside that Mr. Escobar's spouse is technically his next of kin as he does not have any documentation designating a healthcare surrogate.  If she is unable to make decisions his next of kin would be the majority of his adult children.  Family demonstrated understanding.  Butch expressed interest in de-escalating CODE STATUS to include at least no CPR and/or cardioversion/defibrillation however wish to discuss with patient's spouse and additional family prior to making any decisions.  Radha and Cathy appear to have good prognostic awareness.  Questions answered to patient's family satisfaction.  Support provided.    The patient receives support from his spouse, children, sibling and extended family. Patient's spouse is his next of kin.    Due to the palliative care topics discussed including goals of care, treatment options, discharge options and medical priorities we will establish an advance care plan.      Review of Systems   Unable to perform ROS: " intubated     Pain Assessment  Nonverbal Indicators of Pain: muscle tension, grimace  CPOT Facial Expression: 0-->relaxed, neutral  CPOT Body Movements: 0-->absence of movements  CPOT Muscle Tension: 0-->relaxed  Ventilator Compliance/Vocalization: 0-->tolerating ventilator or movement  CPOT Score: 0  PAINAD Breathin-->normal  PAINAD Negative Vocalization: 0-->none  PAINAD Facial Expression: 0-->smiling or inexpressive  PAINAD Body Language: 0-->relaxed  PAINAD Consolability: 0-->no need to console  PAINAD Score: 0  Pain Location: head, other (see comments)  Pain Description: aching  Past Medical History:   Diagnosis Date   • Brain tumor    • Coronary artery disease    • COVID-19 vaccine series completed     MODERNA X 3; LAST DOSE 3/2022   • Diabetes    • Erectile dysfunction    • GERD (gastroesophageal reflux disease)    • Hypercholesteremia    • Hypertension    • Seizure       Past Surgical History:   Procedure Laterality Date   • BALLOON ANGIOPLASTY, ARTERY Right    • COLONOSCOPY     • CRANIECTOMY Right 2022    Procedure: CRANIECTOMY WITH INCISIONAL WASHOUT;  Surgeon: Felipe Banerjee MD;  Location:  [facility] OR;  Service: Neurosurgery;  Laterality: Right;   • CRANIOPLASTY Right 10/4/2022    Procedure: CRANIOPLASTY right with Lumbar drain;  Surgeon: Flaco Portillo MD;  Location:  [facility] OR;  Service: Neurosurgery;  Laterality: Right;   • CRANIOPLASTY N/A 2023    Procedure: CRANIOPLASTY, removal, right, horseshoe;  Surgeon: Flaco Portillo MD;  Location:  [facility];  Service: Neurosurgery;  Laterality: N/A;   • CRANIOTOMY Right 2023    Procedure: CRANIOTOMY; evacuation of right hematoma;  Surgeon: Flaco Portillo MD;  Location:  [facility];  Service: Neurosurgery;  Laterality: Right;   • CRANIOTOMY FOR TUMOR Right 2022    Procedure: CRANIOTOMY FOR TUMOR STERIOTACTIC WITH BRAIN LAB, right;  Surgeon: Flaco Portillo MD;  Location:  [facility];  Service: Neurosurgery;   Laterality: Right;   • ENDOSCOPY WITH GASTROSTOMY TUBE INSERTION N/A 2023    Procedure: ESOPHAGOGASTRODUODENOSCOPY WITH GASTROSTOMY TUBE INSERTION;  Surgeon: Geeta Abdalla MD;  Location:  [facility];  Service: General;  Laterality: N/A;   • TRACHEOSTOMY N/A 2023    Procedure: TRACHEOSTOMY;  Surgeon: Wagner Villarreal MD;  Location:  [facility];  Service: ENT;  Laterality: N/A;      Social History     Socioeconomic History   • Marital status:    Tobacco Use   • Smoking status: Former     Packs/day: 0.25     Years: 15.00     Pack years: 3.75     Types: Cigarettes     Quit date: 3/15/2023     Years since quittin.1   • Smokeless tobacco: Never   Vaping Use   • Vaping Use: Never used   Substance and Sexual Activity   • Alcohol use: Never   • Drug use: Never   • Sexual activity: Defer     Family History   Problem Relation Age of Onset   • Cancer Mother         liver   • Heart disease Father       No Known Allergies    Objective   Diagnostics: Reviewed    Intake/Output Summary (Last 24 hours) at 5/3/2023 0813  Last data filed at 5/3/2023 0517  Gross per 24 hour   Intake 3192.19 ml   Output 1575 ml   Net 1617.19 ml       Current medications patient is presently taking including all prescriptions, over-the-counter, herbals and vitamin/mineral/dietary (nutritional) supplements with reviewed including route, type, dose and frequency and are current per MAR at time of dictation.  Current Facility-Administered Medications   Medication Dose Route Frequency Provider Last Rate Last Admin   • acetaminophen (TYLENOL) tablet 650 mg  650 mg Oral Q4H PRN Wagner Villarreal MD   650 mg at 04/15/23 1152    Or   • acetaminophen (TYLENOL) 160 MG/5ML solution 650 mg  650 mg Oral Q4H PRN Wagner Villarreal MD   650 mg at 23 2259    Or   • acetaminophen (TYLENOL) suppository 650 mg  650 mg Rectal Q4H PRN Wagner Villarreal MD       • amiodarone 360 mg in 200 mL D5W infusion  1 mg/min Intravenous  Continuous Flaco Portillo MD        Followed by   • amiodarone 360 mg in 200 mL D5W infusion  0.5 mg/min Intravenous Continuous Flaco Portillo MD       • aspirin chewable tablet 81 mg  81 mg Oral Daily Wagner Villarreal MD   81 mg at 05/02/23 0825   • atorvastatin (LIPITOR) tablet 20 mg  20 mg Oral Nightly Wagner Villarreal MD   20 mg at 05/02/23 2116   • budesonide (PULMICORT) nebulizer solution 0.5 mg  0.5 mg Nebulization BID - RT Wagner Villarreal MD   0.5 mg at 05/03/23 0600   • butalbital-acetaminophen-caffeine (FIORICET, ESGIC) -40 MG per tablet 1 tablet  1 tablet Oral Q6H PRN Wagner Villarreal MD   1 tablet at 04/12/23 1132   • calcium carbonate (TUMS) chewable tablet 500 mg (200 mg elemental)  2 tablet Oral TID PRN Wagner Villarreal MD   2 tablet at 04/15/23 1131   • Chlorhexidine Gluconate Cloth 2 % pads 1 application  1 application Topical Q24H Wagner Villarreal MD   1 application at 05/03/23 0419   • dexmedetomidine (PRECEDEX) 400 mcg in 100 mL NS infusion  0.2-1.5 mcg/kg/hr Intravenous Titrated Tracy Gupta MD 13 mL/hr at 05/03/23 0559 0.4 mcg/kg/hr at 05/03/23 0559   • dextrose (D50W) (25 g/50 mL) IV injection 25 g  25 g Intravenous Q15 Min PRN Wagner Villarreal MD   25 g at 04/13/23 2121   • dextrose (GLUTOSE) oral gel 15 g  15 g Oral Q15 Min PRN Wagner Villarreal MD       • digoxin (LANOXIN) tablet 250 mcg  250 mcg Oral Daily Wagner Villarreal MD   250 mcg at 05/02/23 1154   • dilTIAZem (CARDIZEM) tablet 90 mg  90 mg Oral Q8H Wagner Villarreal MD   90 mg at 05/03/23 0514   • furosemide (LASIX) injection 40 mg  40 mg Intravenous Daily Tracy Gupta MD   40 mg at 05/02/23 0826   • glucagon (human recombinant) (GLUCAGEN DIAGNOSTIC) injection 1 mg  1 mg Intramuscular Q15 Min PRN Wagner Villarreal MD       • hydrOXYzine (ATARAX) tablet 25 mg  25 mg Oral TID PRN Wagner Villarreal MD   25 mg at 04/16/23 1139   • insulin detemir (LEVEMIR)  injection 25 Units  25 Units Subcutaneous Q12H Wagner Villarreal MD   25 Units at 05/02/23 2116   • Insulin Lispro (humaLOG) injection 0-14 Units  0-14 Units Subcutaneous Q6H Wagner Villarreal MD   5 Units at 05/03/23 0529   • ipratropium (ATROVENT) nebulizer solution 0.5 mg  0.5 mg Nebulization 4x Daily PRN Wagner Villarreal MD       • ipratropium (ATROVENT) nebulizer solution 0.5 mg  0.5 mg Nebulization 4x Daily - RT Wagner Villarreal MD   0.5 mg at 05/03/23 0600   • ipratropium-albuterol (DUO-NEB) nebulizer solution 3 mL  3 mL Nebulization Q4H PRN Wagner Villarreal MD   3 mL at 04/27/23 1021   • levETIRAcetam (KEPPRA) tablet 500 mg  500 mg Oral BID Wagner Villarreal MD   500 mg at 05/02/23 2116   • levoFLOXacin (LEVAQUIN) tablet 500 mg  500 mg Oral Q24H Wagner Villarreal MD   500 mg at 05/02/23 0826   • Linezolid (ZYVOX) 600 mg 300 mL  600 mg Intravenous Q12H Lillian Pike  mL/hr at 05/02/23 2250 600 mg at 05/02/23 2250   • methylnaltrexone (RELISTOR) injection 12 mg  12 mg Subcutaneous Every Other Day Wagner Villarreal MD   12 mg at 05/01/23 0800   • metoprolol tartrate (LOPRESSOR) tablet 50 mg  50 mg Oral Q12H Wagner Villarreal MD   50 mg at 05/02/23 2116   • morphine injection 5 mg  5 mg Intravenous Q4H PRN Ramin Singh DO   5 mg at 05/03/23 0415   • multivitamin with minerals 1 tablet  1 tablet Oral Daily Wagner Villarreal MD   1 tablet at 05/02/23 0941   • nicotine (NICODERM CQ) 14 MG/24HR patch 1 patch  1 patch Transdermal Daily PRN Wagner Villarreal MD       • ondansetron (ZOFRAN) tablet 4 mg  4 mg Oral Q6H PRN Wagner Villarreal MD        Or   • ondansetron (ZOFRAN) injection 4 mg  4 mg Intravenous Q6H PRN Wagner Villarreal MD   4 mg at 04/25/23 0121   • pantoprazole (PROTONIX) injection 40 mg  40 mg Intravenous Q AM Wagner Villarreal MD   40 mg at 05/03/23 0514   • polyethylene glycol (MIRALAX) packet 17 g  17 g Oral Daily Wagner Villarreal  "MD Joshua   17 g at 05/01/23 0800   • propofol (DIPRIVAN) infusion 10 mg/mL 100 mL  5-50 mcg/kg/min Intravenous Titrated Wagner Villarreal MD   Stopped at 04/30/23 1130   • sennosides-docusate (PERICOLACE) 8.6-50 MG per tablet 1 tablet  1 tablet Oral Daily Wagner Villarreal MD   1 tablet at 05/01/23 0800   • sodium chloride 0.9 % flush 10 mL  10 mL Intravenous Q12H Wagner Villarreal MD   10 mL at 05/02/23 2117   • sodium chloride 0.9 % flush 10 mL  10 mL Intravenous PRN Wagner Villarreal MD   10 mL at 04/27/23 0542   • sodium chloride 0.9 % infusion 40 mL  40 mL Intravenous PRN Wagner Villarreal MD        amiodarone, 1 mg/min   Followed by  amiodarone, 0.5 mg/min  dexmedetomidine, 0.2-1.5 mcg/kg/hr, Last Rate: 0.4 mcg/kg/hr (05/03/23 0559)  propofol, 5-50 mcg/kg/min, Last Rate: Stopped (04/30/23 1130)     •  acetaminophen **OR** acetaminophen **OR** acetaminophen  •  butalbital-acetaminophen-caffeine  •  calcium carbonate  •  dextrose  •  dextrose  •  glucagon (human recombinant)  •  hydrOXYzine  •  ipratropium  •  ipratropium-albuterol  •  Morphine  •  nicotine  •  ondansetron **OR** ondansetron  •  sodium chloride  •  sodium chloride  Assessment   /73   Pulse 89   Temp 97.8 °F (36.6 °C) (Axillary)   Resp 27   Ht 177.8 cm (70\")   Wt 126 kg (277 lb 11.2 oz)   SpO2 96%   BMI 39.85 kg/m²     Physical Exam  Vitals and nursing note reviewed.   Constitutional:       Appearance: He is obese. He is ill-appearing.      Interventions: He is sedated and intubated.   HENT:      Head: Normocephalic.      Mouth/Throat:      Comments: Increased secretions from mouth.   Eyes:      General: Lids are normal.   Neck:      Trachea: Tracheostomy present.   Cardiovascular:      Rate and Rhythm: Normal rate.   Pulmonary:      Effort: He is intubated.      Comments: On ventilator PEEP 5, FiO2 100%.   Abdominal:      General: Abdomen is protuberant.      Palpations: Abdomen is soft.      Comments: PEG tube " present.   Skin:     General: Skin is warm and dry.   Psychiatric:         Cognition and Memory: Cognition is impaired.     Functional status: Palliative Performance Scale Score: Performance 10% based on the following measures: Ambulation: Totally bed bound, Activity and Evidence of Disease: Unable to do any work, extensive evidence of disease, Self-Care: Total care required,  Intake: Mouth care only, LOC: Drowsy or comatose.  Nutritional status: Albumin 2.5. Body mass index is 39.85 kg/m².  Patient status: Disease state: Controlled with current treatments.    Impression/Problem List:  1. Meningioma s/p craniotomy   2. Ventricular fibrillation arrest  3. Pulseless electrical activity arrest  4. Intracranial epidural hematoma s/p evacuation  5. Acute systolic heart failure  6. Acute respiratory failure with hypoxia s/p tracheostomy  7. Type II myocardial infarction due to arrhythmia  8. Obstructive sleep apnea  9. Coronary artery disease  10. Type 2 diabetes mellitus with hyperglycemia and peripheral neuropathy with long-term current use of insulin  11. Tobacco abuse, in remission  12. COPD  13. Obesity  14. Paroxysmal atrial fibrillation with rapid ventricular response  15. Presence of PEG tube    Plan / Recommendations     1. Palliative Care Encounter   • Goals of care include CODE STATUS CPR with full support interventions.    • Prognosis is poor long-term secondary to meningioma s/p craniotomy, V-fib arrest, PEA arrest, intracranial epidural hematoma s/p evacuation, acute systolic heart failure, acute respiratory failure with hypoxia requiring tracheostomy, type II myocardial infarction due to arrhythmia and other comorbidities listed above.    • Mr. Escobar is intubated, sedated and unable to demonstrate ability to make complex medical decisions at this time.  Spoke with patient's son, Cathy, and patient's sister, Radha, at bedside.  Details of discussion above.    • Family reports patient is  however  she is deaf and unable to communicate via telephone.  Reports they have been discussing with providers/treatment team and relaying to patient's spouse by writing information down for her to read.      • Discussed events during hospitalization and overall poor prognosis.  Also discussed medical priorities and treatment options.    • Family at bedside appear to have good prognostic awareness.  Expressed wishes to discuss with patient's spouse and additional family member prior to making any decisions in regards to CODE STATUS/treatment plan.  o Briefly discussed with pulmonology and hospitalist.  o Offered to call additional family members to answer any questions and/or concerns.  Declined at this time.  Provided Palliative Care office number if questions and/or concerns arise.    •  Questions answered to patient's family satisfaction.  Support provided.    ADDENDUM:  Discussed with nursing and son, Cathy, has arrived back to bedside this afternoon and expressed wishes to de-escalate CODE STATUS to include no CPR and/or cardioversion.  Reports wishes to let him pass peacefully if/when were to go back into cardiac arrest.  CODE STATUS has been changed to reflect this.  Family to further discuss additional treatment options.    Thank you for allowing us to participate in patient's plan of care. Palliative Care Team will continue to follow patient. Will plan to follow up tomorrow or sooner if needed.    Time spent:115 minutes spent reviewing medical and medication records, assessing and examining patient, discussing with family, answering questions, providing some guidance about a plan and documentation of care, and coordinating care with other healthcare members, with > 50% time spent face to face.   25 minutes spent on advance care planning.    Electronically signed by, ADITYA Gonzalez, 05/03/23.

## 2023-05-03 NOTE — SIGNIFICANT NOTE
05/02/23 1907   Readings   Plateau Pressure (cm H2O) 18 cm H2O   Driving Pressure (cm H2O) 13.5 cm H2O   Static Compliance (L/cm H2O) 41

## 2023-05-03 NOTE — CONSULTS
[facility] Medicine Consult  Consults    Date of Admission: 3/31/2023  Date of Consult: 05/03/23    Primary Care Physician: Brianna Martinez APRN  Referring Physician: Dr. Portillo with neurosurgery.  Chief Complaint/Reason for Consultation: Assistance in the management patient postcode.    Subjective   History of Present Illness  This is a 67 year old -American gentleman who has been admitted to the neurosurgical service since 3/31.  Our service had previously consulted on him.  I have not seen him since Sunday, 4/9.  At that time, he was status post craniotomy, on antibiotics, and slated to go to the LTAC.  Since I last saw him, Dr. Singh and Dr. Milligan have seen him.  Dr. Singh signed off his case on 4/28.    He had a PEA arrest on 4/16.      He underwent tracheostomy with Dr. Villarreal on 4/25.  He underwent PEG tube placement with Dr. Abdalla on the same date.    Infectious disease has continued to follow him, Dr. Santos.  He continues on Zyvox and Levaquin.    Nursing states that he did not have anything significant going on this morning.  The services caring for him are again waiting for possible LTAC placement.  He ultimately had a ventricular arrhythmia arrest.  He primarily had ventricular fibrillation.  He required defibrillation x3 and has received IV amiodarone.  Dr. Portillo came to the bedside during the resuscitation efforts and reconsulted me.  He also wants to consult electrophysiology given his prior problems with atrial fibrillation, recent PEA arrest, and now ventricular fibrillation arrest.    Review of Systems  Unable to obtain secondary to unresponsiveness and being ventilated by tracheostomy.    Past Medical History:   Past Medical History:   Diagnosis Date   • Brain tumor    • Coronary artery disease    • COVID-19 vaccine series completed     MODERNA X 3; LAST DOSE 3/2022   • Diabetes    • Erectile dysfunction    • GERD (gastroesophageal  reflux disease)    • Hypercholesteremia    • Hypertension    • Seizure      Past Surgical History:  Past Surgical History:   Procedure Laterality Date   • BALLOON ANGIOPLASTY, ARTERY Right    • COLONOSCOPY     • CRANIECTOMY Right 7/1/2022    Procedure: CRANIECTOMY WITH INCISIONAL WASHOUT;  Surgeon: Felipe Banerjee MD;  Location:  [facility];  Service: Neurosurgery;  Laterality: Right;   • CRANIOPLASTY Right 10/4/2022    Procedure: CRANIOPLASTY right with Lumbar drain;  Surgeon: Flaco Portillo MD;  Location:  [facility];  Service: Neurosurgery;  Laterality: Right;   • CRANIOPLASTY N/A 4/4/2023    Procedure: CRANIOPLASTY, removal, right, horseshoe;  Surgeon: Flaco Portillo MD;  Location:  [facility];  Service: Neurosurgery;  Laterality: N/A;   • CRANIOTOMY Right 4/12/2023    Procedure: CRANIOTOMY; evacuation of right hematoma;  Surgeon: Flaco Portillo MD;  Location:  [facility];  Service: Neurosurgery;  Laterality: Right;   • CRANIOTOMY FOR TUMOR Right 6/16/2022    Procedure: CRANIOTOMY FOR TUMOR STERIOTACTIC WITH BRAIN LAB, right;  Surgeon: Flaco Portillo MD;  Location:  [facility];  Service: Neurosurgery;  Laterality: Right;   • ENDOSCOPY WITH GASTROSTOMY TUBE INSERTION N/A 4/25/2023    Procedure: ESOPHAGOGASTRODUODENOSCOPY WITH GASTROSTOMY TUBE INSERTION;  Surgeon: Geeta Abdalla MD;  Location:  [facility];  Service: General;  Laterality: N/A;   • TRACHEOSTOMY N/A 4/25/2023    Procedure: TRACHEOSTOMY;  Surgeon: Wagner Villarreal MD;  Location:  [facility];  Service: ENT;  Laterality: N/A;     Social History:  reports that he quit smoking about 7 weeks ago. His smoking use included cigarettes. He has a 3.75 pack-year smoking history. He has never used smokeless tobacco. He reports that he does not drink alcohol and does not use drugs.    Family History: family history includes Cancer in his mother; Heart disease in his father.     Allergies: No Known Allergies  Medications: Scheduled  Meds:aspirin, 81 mg, Oral, Daily  atorvastatin, 20 mg, Oral, Nightly  budesonide, 0.5 mg, Nebulization, BID - RT  Chlorhexidine Gluconate Cloth, 1 application, Topical, Q24H  digoxin, 250 mcg, Oral, Daily  dilTIAZem, 90 mg, Oral, Q8H  furosemide, 40 mg, Intravenous, Daily  insulin detemir, 25 Units, Subcutaneous, Q12H  insulin lispro, 0-14 Units, Subcutaneous, Q6H  ipratropium, 0.5 mg, Nebulization, 4x Daily - RT  levETIRAcetam, 500 mg, Oral, BID  levoFLOXacin, 500 mg, Oral, Q24H  Linezolid, 600 mg, Intravenous, Q12H  methylnaltrexone, 12 mg, Subcutaneous, Every Other Day  metoprolol tartrate, 50 mg, Oral, Q12H  multivitamin with minerals, 1 tablet, Oral, Daily  pantoprazole, 40 mg, Intravenous, Q AM  polyethylene glycol, 17 g, Oral, Daily  sennosides-docusate, 1 tablet, Oral, Daily  sodium chloride, 10 mL, Intravenous, Q12H      Continuous Infusions:dexmedetomidine, 0.2-1.5 mcg/kg/hr, Last Rate: 0.4 mcg/kg/hr (05/03/23 0558)  propofol, 5-50 mcg/kg/min, Last Rate: Stopped (04/30/23 1130)      PRN Meds:.•  acetaminophen **OR** acetaminophen **OR** acetaminophen  •  butalbital-acetaminophen-caffeine  •  calcium carbonate  •  dextrose  •  dextrose  •  glucagon (human recombinant)  •  hydrOXYzine  •  ipratropium  •  ipratropium-albuterol  •  Morphine  •  nicotine  •  ondansetron **OR** ondansetron  •  sodium chloride  •  sodium chloride    I have utilized all available immediate resources to obtain, update, or review the patient's current medications (including all prescriptions, over-the-counter products, herbals, cannabis/cannabidiol products, and vitamin/mineral/dietary (nutritional) supplements).     Objective   Objective    Physical Exam:   Temp:  [97.8 °F (36.6 °C)-98.4 °F (36.9 °C)] 97.8 °F (36.6 °C)  Heart Rate:  [58-69] 58  Resp:  [13-27] 21  BP: (110-165)/(63-95) 141/73  FiO2 (%):  [30 %] 30 %  Physical Exam  Constitutional:       Comments: Status post code. Seen with multiple nursing staff members.    HENT:       Head: Normocephalic and atraumatic.   Eyes:      Conjunctiva/sclera: Conjunctivae normal.      Pupils: Pupils are equal, round, and reactive to light.   Neck:      Vascular: No JVD.      Comments: Trach.  Cardiovascular:      Rate and Rhythm: Rhythm irregular.      Heart sounds: Normal heart sounds.      Comments: Now in afib in the 90s, 100s.   Pulmonary:      Breath sounds: Rhonchi present.   Abdominal:      General: Bowel sounds are normal. There is no distension.      Palpations: Abdomen is soft.      Tenderness: There is no abdominal tenderness.      Comments: PEG in place.    Musculoskeletal:         General: Swelling (trace) present. No tenderness or deformity. Normal range of motion.   Skin:     General: Skin is warm and dry.      Findings: No rash.   Neurological:      General: No focal deficit present.      Motor: No abnormal muscle tone.      Comments: Moves extremities to noxious stimuli.          Intake/Output Summary (Last 24 hours) at 5/3/2023 0861  Last data filed at 5/3/2023 0517  Gross per 24 hour   Intake 2810.59 ml   Output 1375 ml   Net 1435.59 ml     Results Reviewed:  I have personally reviewed current lab, radiology, and data and agree with results.  Lab Results (last 24 hours)     Procedure Component Value Units Date/Time    Blood Gas, Arterial - [[id number]]  (Abnormal) Collected: 05/03/23 0851    Specimen: Arterial Blood Updated: 05/03/23 0849     Site Right Brachial     Marek's Test N/A     pH, Arterial 7.181 pH units      Comment: 85 Value below critical limit        pCO2, Arterial 59.8 mm Hg      Comment: 83 Value above reference range        pO2, Arterial 59.0 mm Hg      Comment: 84 Value below reference range        HCO3, Arterial 22.3 mmol/L      Base Excess, Arterial -6.3 mmol/L      Comment: 84 Value below reference range        O2 Saturation, Arterial 77.3 %      Comment: 84 Value below reference range        Temperature 37.0 C      Barometric Pressure for Blood Gas 748 mmHg       Modality Ventilator     FIO2 100 %      Ventilator Mode AC     Set Tidal Volume 550     Set [facility] Resp Rate 12.0     PEEP 5.0     Notified Who DR COULTER     Notified By [id number]     Notified Time 05/03/2023 08:52     Collected by [id number]     Comment: Meter: [id number]     :  [id number]        pCO2, Temperature Corrected 59.8 mm Hg      pH, Temp Corrected 7.181 pH Units      pO2, Temperature Corrected 59.0 mm Hg     Lactic Acid, Plasma [[id number]]  (Abnormal) Collected: 05/03/23 0755    Specimen: Blood Updated: 05/03/23 0847     Lactate 6.9 mmol/L     Manual Differential [[id number]]  (Abnormal) Collected: 05/03/23 0800    Specimen: Blood Updated: 05/03/23 0845     Neutrophil % 73.0 %      Lymphocyte % 14.0 %      Monocyte % 6.0 %      Bands %  5.0 %      Atypical Lymphocyte % 2.0 %      Neutrophils Absolute 13.31 10*3/mm3      Lymphocytes Absolute 2.73 10*3/mm3      Monocytes Absolute 1.02 10*3/mm3      Crenated RBC's Slight/1+     Hypochromia Slight/1+     Poikilocytes Slight/1+     Polychromasia Slight/1+     WBC Morphology Normal     Platelet Morphology Normal    CBC & Differential [[id number]]  (Abnormal) Collected: 05/03/23 0800    Specimen: Blood Updated: 05/03/23 0845    Narrative:      The following orders were created for panel order CBC & Differential.  Procedure                               Abnormality         Status                     ---------                               -----------         ------                     CBC Auto Differential[[id number]]        Abnormal            Final result                 Please view results for these tests on the individual orders.    CBC Auto Differential [[id number]]  (Abnormal) Collected: 05/03/23 0800    Specimen: Blood Updated: 05/03/23 0845     WBC 17.06 10*3/mm3      RBC 3.39 10*6/mm3      Hemoglobin 10.1 g/dL      Hematocrit 33.5 %      MCV 98.8 fL      MCH 29.8 pg      MCHC 30.1 g/dL      RDW 14.6 %      RDW-SD 52.8 fl      MPV 11.3 fL       "Platelets 253 10*3/mm3     Narrative:      The previously reported component NRBC is no longer being reported. Previous result was 1.3 /100 WBC (Reference Range: 0.0-0.2 /100 WBC) on 5/3/2023 at [facility].    Procalcitonin [[id number]]  (Normal) Collected: 05/03/23 0800    Specimen: Blood Updated: 05/03/23 0838     Procalcitonin 0.13 ng/mL     Narrative:      As a Marker for Sepsis (Non-Neonates):    1. <0.5 ng/mL represents a low risk of severe sepsis and/or septic shock.  2. >2 ng/mL represents a high risk of severe sepsis and/or septic shock.    As a Marker for Lower Respiratory Tract Infections that require antibiotic therapy:    PCT on Admission    Antibiotic Therapy       6-12 Hrs later    >0.5                Strongly Recommended  >0.25 - <0.5        Recommended   0.1 - 0.25          Discouraged              Remeasure/reassess PCT  <0.1                Strongly Discouraged     Remeasure/reassess PCT    As 28 day mortality risk marker: \"Change in Procalcitonin Result\" (>80% or <=80%) if Day 0 (or Day 1) and Day 4 values are available. Refer to [email or web address]    Change in PCT <=80%  A decrease of PCT levels below or equal to 80% defines a positive change in PCT test result representing a higher risk for 28-day all-cause mortality of patients diagnosed with severe sepsis for septic shock.    Change in PCT >80%  A decrease of PCT levels of more than 80% defines a negative change in PCT result representing a lower risk for 28-day all-cause mortality of patients diagnosed with severe sepsis or septic shock.       Comprehensive Metabolic Panel [[id number]]  (Abnormal) Collected: 05/03/23 0800    Specimen: Blood Updated: 05/03/23 0834     Glucose 212 mg/dL      BUN 50 mg/dL      Creatinine 1.34 mg/dL      Sodium 139 mmol/L      Potassium 3.4 mmol/L      Chloride 94 mmol/L      CO2 30.0 mmol/L      Calcium 8.1 mg/dL      Total Protein 5.8 g/dL      Albumin 2.5 g/dL      ALT (SGPT) 16 U/L      AST " (SGOT) 19 U/L      Alkaline Phosphatase 151 U/L      Total Bilirubin 0.5 mg/dL      Globulin 3.3 gm/dL      A/G Ratio 0.8 g/dL      BUN/Creatinine Ratio 37.3     Anion Gap 15.0 mmol/L      eGFR 58.1 mL/min/1.73     Narrative:      GFR Normal >60  Chronic Kidney Disease <60  Kidney Failure <15      Magnesium [[id number]]  (Abnormal) Collected: 05/03/23 0800    Specimen: Blood Updated: 05/03/23 0834     Magnesium 4.0 mg/dL     High Sensitivity Troponin T [[id number]]  (Abnormal) Collected: 05/03/23 0800    Specimen: Blood Updated: 05/03/23 0833     HS Troponin T 766 ng/L     Narrative:      High Sensitive Troponin T Reference Range:  <10.0 ng/L- Negative Female for AMI  <15.0 ng/L- Negative Male for AMI  >=10 - Abnormal Female indicating possible myocardial injury.  >=15 - Abnormal Male indicating possible myocardial injury.   Clinicians would have to utilize clinical acumen, EKG, Troponin, and serial changes to determine if it is an Acute Myocardial Infarction or myocardial injury due to an underlying chronic condition.         Phosphorus [[id number]]  (Normal) Collected: 05/03/23 0800    Specimen: Blood Updated: 05/03/23 0831     Phosphorus 3.6 mg/dL     BNP [[id number]]  (Abnormal) Collected: 05/03/23 0800    Specimen: Blood Updated: 05/03/23 0831     proBNP 18,190.0 pg/mL     Narrative:      Among patients with dyspnea, NT-proBNP is highly sensitive for the detection of acute congestive heart failure. In addition NT-proBNP of <300 pg/ml effectively rules out acute congestive heart failure with 99% negative predictive value.    Results may be falsely decreased if patient taking Biotin.      POC Glucose Once [[id number]]  (Abnormal) Collected: 05/03/23 0526    Specimen: Blood Updated: 05/03/23 0537     Glucose 235 mg/dL      Comment: : [id number] Gage Foley ID: [id number]       Blood Gas, Arterial - [[id number]]  (Abnormal) Collected: 05/03/23 0356    Specimen: Arterial Blood Updated: 05/03/23 0355      Site Right Radial     Marek's Test Positive     pH, Arterial 7.559 pH units      Comment: 83 Value above reference range        pCO2, Arterial 41.0 mm Hg      pO2, Arterial 55.2 mm Hg      Comment: 84 Value below reference range        HCO3, Arterial 36.5 mmol/L      Comment: 83 Value above reference range        Base Excess, Arterial 13.1 mmol/L      Comment: 83 Value above reference range        O2 Saturation, Arterial 90.0 %      Comment: 84 Value below reference range        Temperature 37.0 C      Barometric Pressure for Blood Gas 745 mmHg      Modality Ventilator     FIO2 30 %      Ventilator Mode AC     Set Tidal Volume 550     Set Mech Resp Rate 12.0     PEEP 5.0     Collected by LOBO     Comment: Meter: [id number]     :  LOBO        pCO2, Temperature Corrected 41.0 mm Hg      pH, Temp Corrected 7.559 pH Units      pO2, Temperature Corrected 55.2 mm Hg     Blood Culture - Blood, Arm, Right [[id number]]  (Normal) Collected: 04/28/23 0130    Specimen: Blood from Arm, Right Updated: 05/03/23 0200     Blood Culture No growth at 5 days    Blood Culture - Blood, Arm, Right [[id number]]  (Normal) Collected: 04/28/23 0100    Specimen: Blood from Arm, Right Updated: 05/03/23 0200     Blood Culture No growth at 5 days    POC Glucose Once [[id number]]  (Abnormal) Collected: 05/03/23 0014    Specimen: Blood Updated: 05/03/23 0025     Glucose 189 mg/dL      Comment: : [id number] Gage Foley ID: [id number]       Blood Culture With SIMRAN - Blood, Hand, Right [[id number]]  (Normal) Collected: 04/29/23 1756    Specimen: Blood from Hand, Right Updated: 05/02/23 1831     Blood Culture No growth at 3 days    Blood Culture With SIMRAN - Blood, Arm, Left [[id number]]  (Normal) Collected: 04/29/23 1757    Specimen: Blood from Arm, Left Updated: 05/02/23 1831     Blood Culture No growth at 3 days    POC Glucose Once [[id number]]  (Abnormal) Collected: 05/02/23 1754    Specimen: Blood Updated: 05/02/23 1805      Glucose 139 mg/dL      Comment: : [id number] Garry Jacobs  JMeter ID: [id number]       POC Glucose Once [[id number]]  (Abnormal) Collected: 05/02/23 1150    Specimen: Blood Updated: 05/02/23 1211     Glucose 207 mg/dL      Comment: : [id number] Phoenix Guevara JMeter ID: [id number]       Basic Metabolic Panel [[id number]]  (Abnormal) Collected: 05/02/23 0852    Specimen: Blood Updated: 05/02/23 0922     Glucose 148 mg/dL      BUN 56 mg/dL      Creatinine 1.41 mg/dL      Sodium 136 mmol/L      Potassium 3.7 mmol/L      Comment: Slight hemolysis detected by analyzer. Results may be affected.        Chloride 94 mmol/L      CO2 30.0 mmol/L      Calcium 7.8 mg/dL      BUN/Creatinine Ratio 39.7     Anion Gap 12.0 mmol/L      eGFR 54.6 mL/min/1.73     Narrative:      GFR Normal >60  Chronic Kidney Disease <60  Kidney Failure <15          Imaging Results (Last 24 Hours)     Procedure Component Value Units Date/Time    XR Chest 1 View [[id number]] Collected: 05/03/23 0729     Updated: 05/03/23 0735    Narrative:      EXAMINATION: XR CHEST 1 VW- 5/3/2023 7:29 AM CDT     HISTORY: Intubated and sedated; S06.4X0A-Epidural hemorrhage without  loss of consciousness, initial encounter; Z74.09-Other reduced mobility;  Z98.890-Other specified postprocedural states; T81.42XA-Infection  following a procedure, deep incisional surgical site, initial encounter;  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     COMPARISON: Chest x-ray 05/02/2023 0308 hours.     The tracheostomy tube appears in satisfactory position as before. There  is respiratory motion artifact, moderate volume loss is present in the  lung bases and there are are extensive bilateral pulmonary infiltrates,  centrally predominant and slightly asymmetric and greater on the right.  Mild cardiomegaly is present. No pneumothorax or large pleural effusion  is  identified. The osseous structures are unremarkable.       Impression:      CHF with mild improvement in the extent of pulmonary edema  within the left lung. Stable satisfactory position of the tracheostomy  tube.  This report was finalized on 05/03/2023 07:32 by Dr. Elijah Grover MD.        Assessment / Plan   Assessment:   Active Hospital Problems    Diagnosis    • **Swelling of scalp    • Intracranial epidural hematoma    • Post-operative infection    • Meningioma s/p craniotomy    • PEA (Pulseless electrical activity) arrest (4/16/23)    • Ventricular fibrillation arrest (5/3/23)    • Acute systolic heart failure    • Type 2 myocardial infarction due to arrhythmia    • Paroxysmal atrial fibrillation with rapid ventricular response    • Coronary artery disease    • Type 2 diabetes mellitus with hyperglycemia and peripheral neuropathy, with long-term current use of insulin (HCC)    • COPD (chronic obstructive pulmonary disease)    • Obesity (BMI 30-39.9)    • Tobacco abuse, in remission    • ADDIE (obstructive sleep apnea)    • Acute respiratory failure with hypoxia       Treatment Plan  The patient was admitted on 3/31 by Dr. Portillo.  He has been in the hospital for well over a month at this point.  He has a history of craniotomy, craniectomy.  He has had 2 separate episodes of this becoming infected.  He on this hospitalization has undergone repeat craniotomy, has been on prolonged antibiotic therapy.  He suffered a PEA arrest on 4/16.  He underwent tracheostomy and PEG tube placement on 4/25.  He has now developed ventricular fibrillation leading to a cardiac arrest.    Successfully resuscitated after 3 episodes of defibrillation and also now receiving IV amiodarone by drip after a 300 mg bolus.  He was given bicarbonate and magnesium during resuscitative efforts as well.    Would be concerned about possible QT prolongation given the fact that he has recently been on fluoroquinolone antibiotics.  Would also  be concerned about worsening systolic function.  His EF was 46-50% on 3/31/23.  He does have worsening BNP and also has a significant troponin elevation.  Dr. Portillo wants to consult cardiac electrophysiology to evaluate him.  He has also previously been seen by general cardiology (Dr. Samaniego).  Obtain an EKG.  He has had significant problems with atrial fibrillation and rapid ventricular response at times throughout the course of his hospitalization.  He has been receiving digoxin, diltiazem, metoprolol.    He has not been on anticoagulation recently.  He had previously been on therapeutic Lovenox when I was seeing him.  He has been off of this since 4/12.  He has been on an aspirin.  If he remains relatively stable, would send him for a CTA of the chest.    He has lactic acidosis postarrest.  Expected finding.  Trend.  No signs of hemodynamic instability at present.    His renal function has been stable recently.  His serum creatinine has typically been on the order of 1.2 throughout the course of his hospitalization.    He has been followed by pulmonary.  They have been managing ventilatory support.  He is vented via tracheostomy.  They will be notified of his current arterial blood gas.  Repeat chest x-ray.  Ervin Bar RT and I have increased the right of his ventilatory support for the time being.    He is diabetic.  Most recent blood sugars 189, 235, 212.  His most recent hemoglobin A1c was 11.2 at the start of April.    Palliative care has been consulted by the primary service.    Medical Decision Making  Number and Complexity of problems: 1 acute, highly complex problem in the form of his ventricular fibrillation arrest that threatens bodily function and life.  Differential Diagnosis: He has been on levofloxacin recently and may have had difficulty with QT prolongation.  He also has a history of atrial dysrhythmia. Worsening systolic dysfunction?    Conditions and Status        Condition is  worsening.     [facility] Data  External documents reviewed: Reviewed prior [facility] records.  Cardiac tracing (EKG, telemetry) interpretation: Recently in ventricular fibrillation during his arrest.  Now in atrial fibrillation with rates in the 90s, 100s.  Radiology interpretation: As above.  Labs reviewed: As above.  Any tests that were considered but not ordered: CTA of the chest.     Decision rules/scores evaluated (example [id number], Wells, etc): None considered at present.     Discussed with: Nursing staff and Dr. Portillo.     Care Planning  Shared decision making: His family has consented to ongoing treatment.   Code status and discussions: His family has apparently been insistent that he remain a full code with full interventions.    Disposition  Social Determinants of Health that impact treatment or disposition: Prolonged hospitalization.  I expect the patient to be discharged by the primary service.     I confirmed that the patient's Advance Care Plan is present, code status is documented, or surrogate decision maker is listed in the patient's medical record.     The patient's surrogate decision maker is his son, Cathy.     Patient seen and examined by me on 05/03/23 at 0800.    Electronically signed by Joey Moore DO, 05/03/23, 09:06 CDT.    I spent 45 minutes providing critical care management to this patient. This excludes time spent in performing separately billed procedures.

## 2023-05-03 NOTE — CONSULTS
[facility] HEART GROUP CONSULT NOTE    Referring Provider: Flaco Portillo, *    Reason for Consultation: Persistent afib, recent Vfib arrest    Chief complaint: No chief complaint on file.      Subjective .     History of present illness:  Elijah Escobar is a 67 y.o. male with history of PAF with previous sotalol use (July 2022), HTN, DM, who was admitted last summer for craniotomy for meningioma 6/16/22, craniotomy for washout 7/1/22, craniectomy 10/4/22, craniectomy and washout on 4/4/23 due to infection. He has had an unfortunate prolonged hospitalization throughout April complicated by cardiac arrest April 16th that resulted in PEG/trach placement April 25th. On April 16th, he became agitated and required Geodon, Ativan and 2mg morphine which then prompted onset of bradycardia and the respiratory failure. He has been unable to wean from the ventilator since.     On extensive review of records, he also has a history of CAD with previous RCA stent, PVD with previous stent to unknown vessel, PAF vs. Persistent afib in 2022 not anticoagulated due to neurosurgery, HTN, DM, acute hypoxic respiratory failure, elevated troponins with acute myocardial injury.     Since his cardiac arrest April 16th, he has not had any signs of CNS recovery and unfortunately had recurrent Vfib arrest this morning. Echo performed today now shows a depressed LVEF at 21-25%. (Previous echo in April on admission showed hypokinetic wall motion abnormality, but EF was 46-50% at that time and he was acutely ill with his infected right frontal cranioplasty. At that time, he was asymptomatic and no findings to suggest ACS, so it was recommended to have outpatient ischemic work up.)  However, he's continued to decline since his April 16th arrest. Telemetry shows SR currently with occasional PVC.     This morning preceding his arrest, he was in afib and had 2 sets of PVC couplets before VT began. Labs the night before showed  potassium of 3.7, Cr 1.4, CO2 30. Labs drawn this morning at 8am, shortly after cardiac arrest, demonstrated potassium of 3.4, magnesium 4.0,  BUN 50, GFR 58.1, alkaline phosphatase 151, albumin 2.5, lactate 6.9, WBC 17.06, magnesium 4.0, hemoglobin 10.1 and hematocrit 33.5.   EKG does not show prolonged QT or acute ischemic changes with ST elevation or depression.     Troponin #1 766 (increased from 376, 351 two weeks ago)  Troponin #2 1029  Delta  263  BNP 18,190    Currently still a full code, but palliative services have consulted earlier today.       History  Past Medical History:   Diagnosis Date   • Brain tumor    • Coronary artery disease    • COVID-19 vaccine series completed     MODERNA X 3; LAST DOSE 3/2022   • Diabetes    • Erectile dysfunction    • GERD (gastroesophageal reflux disease)    • Hypercholesteremia    • Hypertension    • Seizure    ,   Past Surgical History:   Procedure Laterality Date   • BALLOON ANGIOPLASTY, ARTERY Right    • COLONOSCOPY     • CRANIECTOMY Right 7/1/2022    Procedure: CRANIECTOMY WITH INCISIONAL WASHOUT;  Surgeon: Felipe Banerjee MD;  Location:  [facility] OR;  Service: Neurosurgery;  Laterality: Right;   • CRANIOPLASTY Right 10/4/2022    Procedure: CRANIOPLASTY right with Lumbar drain;  Surgeon: Flaco Portillo MD;  Location:  [facility] OR;  Service: Neurosurgery;  Laterality: Right;   • CRANIOPLASTY N/A 4/4/2023    Procedure: CRANIOPLASTY, removal, right, horseshoe;  Surgeon: Flaco Portillo MD;  Location:  [facility] OR;  Service: Neurosurgery;  Laterality: N/A;   • CRANIOTOMY Right 4/12/2023    Procedure: CRANIOTOMY; evacuation of right hematoma;  Surgeon: Flaco Portillo MD;  Location:  [facility] OR;  Service: Neurosurgery;  Laterality: Right;   • CRANIOTOMY FOR TUMOR Right 6/16/2022    Procedure: CRANIOTOMY FOR TUMOR STERIOTACTIC WITH BRAIN LAB, right;  Surgeon: Flaco Portillo MD;  Location:  [facility] OR;  Service: Neurosurgery;  Laterality: Right;   •  ENDOSCOPY WITH GASTROSTOMY TUBE INSERTION N/A 2023    Procedure: ESOPHAGOGASTRODUODENOSCOPY WITH GASTROSTOMY TUBE INSERTION;  Surgeon: Geeta Abdalla MD;  Location: [facility] OR;  Service: General;  Laterality: N/A;   • TRACHEOSTOMY N/A 2023    Procedure: TRACHEOSTOMY;  Surgeon: Wagner Villarreal MD;  Location: [facility] OR;  Service: ENT;  Laterality: N/A;   ,   Family History   Problem Relation Age of Onset   • Cancer Mother         liver   • Heart disease Father    ,   Social History     Tobacco Use   • Smoking status: Former     Packs/day: 0.25     Years: 15.00     Pack years: 3.75     Types: Cigarettes     Quit date: 3/15/2023     Years since quittin.1   • Smokeless tobacco: Never   Vaping Use   • Vaping Use: Never used   Substance Use Topics   • Alcohol use: Never   • Drug use: Never   ,     Medications      Current Facility-Administered Medications:   •  acetaminophen (TYLENOL) tablet 650 mg, 650 mg, Oral, Q4H PRN, 650 mg at 04/15/23 1152 **OR** acetaminophen (TYLENOL) 160 MG/5ML solution 650 mg, 650 mg, Oral, Q4H PRN, 650 mg at 23 2259 **OR** acetaminophen (TYLENOL) suppository 650 mg, 650 mg, Rectal, Q4H PRN, Wagner Villarreal MD  •  [] amiodarone 360 mg in 200 mL D5W infusion, 1 mg/min, Intravenous, Continuous, Last Rate: 33.3 mL/hr at 23 1331, 1 mg/min at 23 1331 **FOLLOWED BY** amiodarone 360 mg in 200 mL D5W infusion, 0.5 mg/min, Intravenous, Continuous, Flaco Portillo MD, Last Rate: 16.67 mL/hr at 23 1402, 0.5 mg/min at 23 1402  •  aspirin chewable tablet 81 mg, 81 mg, Oral, Daily, Wagner Villarreal MD, 81 mg at 23 0825  •  atorvastatin (LIPITOR) tablet 20 mg, 20 mg, Oral, Nightly, Wagner Villarreal MD, 20 mg at 23 2116  •  budesonide (PULMICORT) nebulizer solution 0.5 mg, 0.5 mg, Nebulization, BID - RT, Wagner Villarreal MD, 0.5 mg at 23 0600  •  butalbital-acetaminophen-caffeine (FIORICET, ESGIC) -40 MG  per tablet 1 tablet, 1 tablet, Oral, Q6H PRN, Wagner Villarreal MD, 1 tablet at 04/12/23 1132  •  calcium carbonate (TUMS) chewable tablet 500 mg (200 mg elemental), 2 tablet, Oral, TID PRN, Wagner Villarreal MD, 2 tablet at 04/15/23 1131  •  Chlorhexidine Gluconate Cloth 2 % pads 1 application, 1 application, Topical, Q24H, Wagner Villarreal MD, 1 application at 05/03/23 0419  •  dexmedetomidine (PRECEDEX) 400 mcg in 100 mL NS infusion, 0.2-1.5 mcg/kg/hr, Intravenous, Titrated, Tracy Gupta MD, Stopped at 05/03/23 0745  •  dextrose (D50W) (25 g/50 mL) IV injection 25 g, 25 g, Intravenous, Q15 Min PRN, Wagner Villarreal MD, 25 g at 04/13/23 2121  •  dextrose (GLUTOSE) oral gel 15 g, 15 g, Oral, Q15 Min PRN, Wagner Villarreal MD  •  digoxin (LANOXIN) tablet 250 mcg, 250 mcg, Oral, Daily, Wagner Villarreal MD, 250 mcg at 05/02/23 1154  •  dilTIAZem (CARDIZEM) tablet 90 mg, 90 mg, Oral, Q8H, Wagner Villarreal MD, 90 mg at 05/03/23 0514  •  furosemide (LASIX) injection 40 mg, 40 mg, Intravenous, Daily, Tracy Gupta MD, 40 mg at 05/03/23 1018  •  glucagon (human recombinant) (GLUCAGEN DIAGNOSTIC) injection 1 mg, 1 mg, Intramuscular, Q15 Min PRN, Wagner Villarreal MD  •  heparin (porcine) 5000 UNIT/ML injection 5,000 Units, 5,000 Units, Subcutaneous, Q12H, Flaco Portillo MD, 5,000 Units at 05/03/23 1126  •  hydrOXYzine (ATARAX) tablet 25 mg, 25 mg, Oral, TID PRN, Wagner Villarreal MD, 25 mg at 04/16/23 1139  •  insulin detemir (LEVEMIR) injection 25 Units, 25 Units, Subcutaneous, Q12H, Wagner Villarreal MD, 25 Units at 05/03/23 1019  •  Insulin Lispro (humaLOG) injection 0-14 Units, 0-14 Units, Subcutaneous, Q6H, Wagner Villarreal MD, 5 Units at 05/03/23 1208  •  ipratropium (ATROVENT) nebulizer solution 0.5 mg, 0.5 mg, Nebulization, 4x Daily PRN, Wagner Villarreal MD  •  ipratropium (ATROVENT) nebulizer solution 0.5 mg, 0.5 mg, Nebulization, 4x Daily - RT, Phil  Wagner Lewis MD, 0.5 mg at 05/03/23 1400  •  ipratropium-albuterol (DUO-NEB) nebulizer solution 3 mL, 3 mL, Nebulization, Q4H PRN, Wagner Villarreal MD, 3 mL at 04/27/23 1021  •  levETIRAcetam (KEPPRA) tablet 500 mg, 500 mg, Oral, BID, Wagner Villarreal MD, 500 mg at 05/02/23 2116  •  levoFLOXacin (LEVAQUIN) tablet 500 mg, 500 mg, Oral, Q24H, Wagner Villarreal MD, 500 mg at 05/02/23 0826  •  Linezolid (ZYVOX) 600 mg 300 mL, 600 mg, Intravenous, Q12H, Lillian Pike MD, Last Rate: 300 mL/hr at 05/02/23 2250, 600 mg at 05/02/23 2250  •  methylnaltrexone (RELISTOR) injection 12 mg, 12 mg, Subcutaneous, Every Other Day, Wagner Villarreal MD, 12 mg at 05/03/23 1019  •  metoprolol tartrate (LOPRESSOR) tablet 50 mg, 50 mg, Oral, Q12H, Wagner Villarreal MD, 50 mg at 05/02/23 2116  •  morphine injection 5 mg, 5 mg, Intravenous, Q4H PRN, Ramin Singh DO, 5 mg at 05/03/23 0415  •  multivitamin with minerals 1 tablet, 1 tablet, Oral, Daily, Wagner Villarreal MD, 1 tablet at 05/02/23 0941  •  nicotine (NICODERM CQ) 14 MG/24HR patch 1 patch, 1 patch, Transdermal, Daily PRN, Wagner Villarreal MD  •  ondansetron (ZOFRAN) tablet 4 mg, 4 mg, Oral, Q6H PRN **OR** ondansetron (ZOFRAN) injection 4 mg, 4 mg, Intravenous, Q6H PRN, Wagner Villarreal MD, 4 mg at 04/25/23 0121  •  pantoprazole (PROTONIX) injection 40 mg, 40 mg, Intravenous, Q AM, Wagner Villarreal MD, 40 mg at 05/03/23 0514  •  polyethylene glycol (MIRALAX) packet 17 g, 17 g, Oral, Daily, Wagner Villarreal MD, 17 g at 05/01/23 0800  •  propofol (DIPRIVAN) infusion 10 mg/mL 100 mL, 5-50 mcg/kg/min, Intravenous, Titrated, Wagner Villarreal MD, Stopped at 04/30/23 1130  •  sennosides-docusate (PERICOLACE) 8.6-50 MG per tablet 1 tablet, 1 tablet, Oral, Daily, Wagner Villarreal MD, 1 tablet at 05/01/23 0800  •  sodium chloride 0.9 % flush 10 mL, 10 mL, Intravenous, Q12H, Wagner Villarreal MD, 10 mL at 05/03/23 1126  •   "sodium chloride 0.9 % flush 10 mL, 10 mL, Intravenous, PRN, Wagner Villarreal MD, 10 mL at 04/27/23 0542  •  sodium chloride 0.9 % infusion 40 mL, 40 mL, Intravenous, PRN, Wagner Villarreal MD    Allergies:  Patient has no known allergies.    Review of Systems  Review of Systems   Unable to perform ROS: patient unresponsive   patient is intubated     Objective     Physical Exam:  Visit Vitals  /68   Pulse 84   Temp 98.5 °F (36.9 °C) (Axillary)   Resp 28   Ht 177.8 cm (70\")   Wt 126 kg (277 lb 11.2 oz)   SpO2 100%   BMI 39.85 kg/m²       Constitutional:       Appearance: Obese. Acutely ill-appearing.      Interventions: Intubated.      Comments: Agitated, intubated    Neck:      Comments: Difficult to assess due to agitation   Pulmonary:      Effort: Increased respiratory effort. Intubated.      Breath sounds: Rales present.      Comments: Crackles anteriorly and laterally   Cardiovascular:      PMI at left midclavicular line. Normal rate. Regular rhythm.      Murmurs: There is no murmur.      No gallop. No click. No rub.   Edema:     Thigh: bilateral trace edema of the thigh.     Feet: bilateral trace edema of the feet.  Abdominal:      General: There is distension.   Skin:     General: Skin is warm and moist.   Neurological:      Mental Status: Exhibits a cognitive deficit.      Comments: Unresponsive          Results Review:   I reviewed the patient's new clinical results.    Lab Results   Component Value Date    GLUCOSE 212 (H) 05/03/2023    CALCIUM 8.1 (L) 05/03/2023     05/03/2023    K 3.4 (L) 05/03/2023    CO2 30.0 (H) 05/03/2023    CL 94 (L) 05/03/2023    BUN 50 (H) 05/03/2023    CREATININE 1.34 (H) 05/03/2023    EGFR 58.1 (L) 05/03/2023    BCR 37.3 (H) 05/03/2023    ANIONGAP 15.0 05/03/2023             Imaging Results (Last 72 Hours)     Procedure Component Value Units Date/Time    XR Chest 1 View [[id number]] Collected: 05/03/23 0729     Updated: 05/03/23 0735    Narrative:      " EXAMINATION: XR CHEST 1 - 5/3/2023 7:29 AM CDT     HISTORY: Intubated and sedated; S06.4X0A-Epidural hemorrhage without  loss of consciousness, initial encounter; Z74.09-Other reduced mobility;  Z98.890-Other specified postprocedural states; T81.42XA-Infection  following a procedure, deep incisional surgical site, initial encounter;  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     COMPARISON: Chest x-ray 05/02/2023 0308 hours.     The tracheostomy tube appears in satisfactory position as before. There  is respiratory motion artifact, moderate volume loss is present in the  lung bases and there are are extensive bilateral pulmonary infiltrates,  centrally predominant and slightly asymmetric and greater on the right.  Mild cardiomegaly is present. No pneumothorax or large pleural effusion  is identified. The osseous structures are unremarkable.       Impression:      CHF with mild improvement in the extent of pulmonary edema  within the left lung. Stable satisfactory position of the tracheostomy  tube.  This report was finalized on 05/03/2023 07:32 by Dr. Elijah Grover MD.    XR Chest 1 View [[id number]] Collected: 05/02/23 0708     Updated: 05/02/23 0712    Narrative:      EXAMINATION:  XR CHEST 1 -  5/2/2023 3:35 AM CDT     HISTORY: Intubated and sedated. S06.4X0A-Epidural hemorrhage without  loss of consciousness, initial encounter; Z74.09-Other reduced mobility;  Z98.890-Other specified postprocedural states; T81.42XA-Infection  following a procedure, deep incisional surgical site, initial encounter.     COMPARISON: 05/01/2023.     TECHNIQUE: Single view AP image.     FINDINGS:  There are diffuse bilateral lung infiltrates without  significant change. There is blunting of the costophrenic angles. There  is cardiomegaly. The thoracic aorta is atheromatous. Tracheostomy tube  remains in place. No acute  bony abnormality is seen.          Impression:      1. Diffuse lung infiltrates. No significant change.  2. Blunting of the costophrenic angles. Cardiomegaly.        This report was finalized on 05/02/2023 07:09 by Dr. Arian Stauffer MD.    XR Chest 1 View [[id number]] Collected: 05/01/23 0722     Updated: 05/01/23 0728    Narrative:      EXAMINATION: XR CHEST 1 VW- 5/1/2023 7:22 AM CDT     HISTORY: Intubated and sedated; S06.4X0A-Epidural hemorrhage without  loss of consciousness, initial encounter; Z74.09-Other reduced mobility;  Z98.890-Other specified postprocedural states; T81.42XA-Infection  following a procedure, deep incisional surgical site, initial encounter;  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     COMPARISON: Chest x-ray 04/30/2023 0333 hours.     The tracheostomy tube remains in satisfactory position, there is volume  loss at the lung bases as before, with central vascular crowding and  hazy bilateral lung infiltrates, centrally predominant. There appears to  be increased atelectasis at the left base. There is borderline  cardiomegaly. No pneumothorax is identified. The osseous structures are  unremarkable.       Impression:      Increased airspace opacities at the left base may be due to  increasing atelectasis, more diffuse pulmonary infiltrates in both lungs  are stable. The tracheostomy tube remains in satisfactory position.  This report was finalized on 05/01/2023 07:25 by Dr. Elijah Grover MD.                  Assessment & Plan       Ventricular fibrillation arrest (5/3/23)    Meningioma s/p craniotomy    Coronary artery disease    Type 2 diabetes mellitus with hyperglycemia and peripheral neuropathy, with long-term current use of insulin (HCC)    Paroxysmal atrial fibrillation with rapid ventricular response    Post-operative infection    Swelling of scalp    Acute respiratory  failure with hypoxia    Type 2 myocardial infarction due to arrhythmia    COPD (chronic obstructive pulmonary disease)    Obesity (BMI 30-39.9)    Tobacco abuse, in remission    Acute systolic heart failure    ADDIE (obstructive sleep apnea)    Intracranial epidural hematoma    PEA (Pulseless electrical activity) arrest (4/16/23)    Vfib arrest: reviewed telemetry strips with Dr. Trinidad who recommends continuing with amiodarone infusion. QT did not appear to be prolonged prior to onset of VT. Currently at the time of evaluation, the patient still is a full code and son is present by bedside. Lengthy discussion about what palliative care had discussed earlier.     HFrEF: Certainly, his EF declining further to 21-25% may have precipitated arrhythmia. Depending on what the family's choices are and if they wish to be aggressive, then ischemic work up with cath would be next step. However, given Mr. Escobar' current state and ongoing morbidities, cardiac intervention would not likely improve his neurological status and may complicate intracranial bleeding with DAPT therapy. Discussed this with patient's son.     Persistent afib: had been in afib persistently but had not been anticoagulated due to ongoing craniotomy complications. Currently in sinus rhythm post code while on Amiodarone infusion, not anticoagulated, no evidence of CVA on previous MRI 4-19-23.     Further orders per Dr. Trinidad     Thank you for asking us to follow this patient with you.       Electronically signed by ALISON Michelle, 05/03/23, 2:30 PM CDT.

## 2023-05-03 NOTE — PLAN OF CARE
Goal Outcome Evaluation:  Plan of Care Reviewed With: patient        Progress: declining  Outcome Evaluation: code status updated today s/p code this AM. FiO2 100%  K+ replaced with protocol.

## 2023-05-03 NOTE — PROGRESS NOTES
Neurosurgery Daily Progress Note    HPI:  Elijah Escobar is a 67 y.o. male with a significant medical history of hypertension, diabetes, hyperlipidemia, seizures, craniotomy for tumor (6/16/2022), craniotomy for washout (7/1/2022), craniectomy (10/4/2022), coronary artery disease, diabetes, erectile dysfunction, GERD, hypertension, hyperlipidemia, tobacco abuse, and obesity.  He presents today with a complaint of a 4-5 days onset of progressively worsening right frontal, temporal, and periorbital edema and associated symptoms to include, but not limited to generalized fatigue, chills, dyspnea, intermittent nausea without vomiting, and states he's felt feverish.  Physical exam findings of neurologically intact with right frontal, temporal, and periorbital swelling.  WBC elevated at 15.  3 to an CRP elevated at 14.75.  Imaging pending.    Assessment:   Past Medical History:   Diagnosis Date   • Brain tumor    • Coronary artery disease    • COVID-19 vaccine series completed     MODERNA X 3; LAST DOSE 3/2022   • Diabetes    • Erectile dysfunction    • GERD (gastroesophageal reflux disease)    • Hypercholesteremia    • Hypertension    • Seizure      Active Hospital Problems    Diagnosis    • **Swelling of scalp    • PEA (Pulseless electrical activity) arrest (4/16/23)    • Ventricular fibrillation arrest (5/3/23)    • COPD (chronic obstructive pulmonary disease)    • Obesity (BMI 30-39.9)    • Tobacco abuse, in remission    • Acute systolic heart failure    • ADDIE (obstructive sleep apnea)    • Type 2 myocardial infarction due to arrhythmia    • Acute respiratory failure with hypoxia    • Intracranial epidural hematoma    • Post-operative infection    • Paroxysmal atrial fibrillation with rapid ventricular response    • Type 2 diabetes mellitus with hyperglycemia and peripheral neuropathy, with long-term current use of insulin (HCC)    • Coronary artery disease    • Meningioma s/p craniotomy      Plan:   Neuro: No  change.  Does not open eyes to voice.  Does not follow commands.  Minimal motor response to painful stimuli.  + gag and cough.     Infected cranial flap      POD #26 (4/4/2023)  craniectomy and washout    Postop code CT and MRI stable. No acute abnormalities.    Heavy 4+ Serratia marcescens     POD #19 (4/13/2023) evacuation of scalp hematoma    Continue neuro exams per policy.  Call for decline  EEG shows no epileptiform activity or ongoing seizures    8 Days Post-Op (4/25/2023) tracheostomy and PEG tube placement.      CV: Code for PEA, 4/16/2023 and 5/3/2023.     AFib/flutter, rate controlled.   ASA 81mg and resume heparin prophylaxis today.    No evidence of DVT or PE per imaging   Appreciate cardiology  Pulm: Ventilation per trach   Increased pulmonary edema per CXR.    : Condom cath  FEN: Enteral feeding per PEG tube  Endocrine: Continue SSI  GI: SHERIF.  +BM  ID:  Infected cranial flap  CSF: no growth to date   PNA.  Resolved   UTI.  Resolved   MRSA +   Antibiotics per ID currently on Levaquin and Zyvox  Heme:  DVT prophylaxis with SCDs and Heparin  Pain: No complaints at present  Dispo: PT/OT   Family has elected proceed with LTAC placement.  LTAC consulted     Chief complaint:   4-5 days onset of progressively worsening right frontal, temporal, and periorbital edema and associated symptoms to include, but not limited to generalized fatigue, chills, dyspnea, intermittent nausea without vomiting, and states he's felt feverish.    HPI  Subjective:  Symptoms:  Stable.      Temp:  [97.8 °F (36.6 °C)-98.4 °F (36.9 °C)] 97.8 °F (36.6 °C)  Heart Rate:  [] 118  Resp:  [13-27] 21  BP: (110-165)/(63-95) 141/73  FiO2 (%):  [30 %] 30 %    Output by Drain (mL) 05/02/23 0701 - 05/02/23 1900 05/02/23 1901 - 05/03/23 0700 05/03/23 0701 - 05/03/23 0951 Range Total   Requested LDAs do not have output data documented.     Objective:  Vital signs: (most recent): Blood pressure 141/73, pulse 118, temperature 97.8 °F (36.6  "°C), temperature source Axillary, resp. rate 21, height 177.8 cm (70\"), weight 126 kg (277 lb 11.2 oz), SpO2 97 %.      Neurologic Exam     Mental Status     Does not open eyes to voice.  Does not follow commands.       Cranial Nerves     CN III, IV, VI   Pupils are equal, round, and reactive to light.    CN VII   Right facial weakness: none  Left facial weakness: none    CN IX, X   Right gag reflex: normal  Left gag reflex: normal    Motor Exam   Minimal motor response to pain to painful stimuli.       Drains: * No LDAs found *    Imaging Results (Last 24 Hours)     Procedure Component Value Units Date/Time    XR Chest 1 View [[id number]] Collected: 05/03/23 0729     Updated: 05/03/23 0735    Narrative:      EXAMINATION: XR CHEST 1 VW- 5/3/2023 7:29 AM CDT     HISTORY: Intubated and sedated; S06.4X0A-Epidural hemorrhage without  loss of consciousness, initial encounter; Z74.09-Other reduced mobility;  Z98.890-Other specified postprocedural states; T81.42XA-Infection  following a procedure, deep incisional surgical site, initial encounter;  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     COMPARISON: Chest x-ray 05/02/2023 0308 hours.     The tracheostomy tube appears in satisfactory position as before. There  is respiratory motion artifact, moderate volume loss is present in the  lung bases and there are are extensive bilateral pulmonary infiltrates,  centrally predominant and slightly asymmetric and greater on the right.  Mild cardiomegaly is present. No pneumothorax or large pleural effusion  is identified. The osseous structures are unremarkable.       Impression:      CHF with mild improvement in the extent of pulmonary edema  within the left lung. Stable satisfactory position of the tracheostomy  tube.  This report was finalized on 05/03/2023 07:32 by Dr. Elijah Grover MD.        Lab Results (last 24 " hours)     Procedure Component Value Units Date/Time    Blood Gas, Arterial - [[id number]]  (Abnormal) Collected: 05/03/23 0851    Specimen: Arterial Blood Updated: 05/03/23 0849     Site Right Brachial     Marek's Test N/A     pH, Arterial 7.181 pH units      Comment: 85 Value below critical limit        pCO2, Arterial 59.8 mm Hg      Comment: 83 Value above reference range        pO2, Arterial 59.0 mm Hg      Comment: 84 Value below reference range        HCO3, Arterial 22.3 mmol/L      Base Excess, Arterial -6.3 mmol/L      Comment: 84 Value below reference range        O2 Saturation, Arterial 77.3 %      Comment: 84 Value below reference range        Temperature 37.0 C      Barometric Pressure for Blood Gas 748 mmHg      Modality Ventilator     FIO2 100 %      Ventilator Mode AC     Set Tidal Volume 550     Set Mech Resp Rate 12.0     PEEP 5.0     Notified Who DR COULTER     Notified By [id number]     Notified Time 05/03/2023 08:52     Collected by [id number]     Comment: Meter: [id number]     :  [id number]        pCO2, Temperature Corrected 59.8 mm Hg      pH, Temp Corrected 7.181 pH Units      pO2, Temperature Corrected 59.0 mm Hg     Lactic Acid, Plasma [[id number]]  (Abnormal) Collected: 05/03/23 0755    Specimen: Blood Updated: 05/03/23 0847     Lactate 6.9 mmol/L     Manual Differential [[id number]]  (Abnormal) Collected: 05/03/23 0800    Specimen: Blood Updated: 05/03/23 0845     Neutrophil % 73.0 %      Lymphocyte % 14.0 %      Monocyte % 6.0 %      Bands %  5.0 %      Atypical Lymphocyte % 2.0 %      Neutrophils Absolute 13.31 10*3/mm3      Lymphocytes Absolute 2.73 10*3/mm3      Monocytes Absolute 1.02 10*3/mm3      Crenated RBC's Slight/1+     Hypochromia Slight/1+     Poikilocytes Slight/1+     Polychromasia Slight/1+     WBC Morphology Normal     Platelet Morphology Normal    CBC & Differential [[id number]]  (Abnormal) Collected: 05/03/23 0800    Specimen: Blood Updated: 05/03/23 0845    Narrative:   "    The following orders were created for panel order CBC & Differential.  Procedure                               Abnormality         Status                     ---------                               -----------         ------                     CBC Auto Differential[[id number]]        Abnormal            Final result                 Please view results for these tests on the individual orders.    CBC Auto Differential [[id number]]  (Abnormal) Collected: 05/03/23 0800    Specimen: Blood Updated: 05/03/23 0845     WBC 17.06 10*3/mm3      RBC 3.39 10*6/mm3      Hemoglobin 10.1 g/dL      Hematocrit 33.5 %      MCV 98.8 fL      MCH 29.8 pg      MCHC 30.1 g/dL      RDW 14.6 %      RDW-SD 52.8 fl      MPV 11.3 fL      Platelets 253 10*3/mm3     Narrative:      The previously reported component NRBC is no longer being reported. Previous result was 1.3 /100 WBC (Reference Range: 0.0-0.2 /100 WBC) on 5/3/2023 at 0821 T.    Procalcitonin [[id number]]  (Normal) Collected: 05/03/23 0800    Specimen: Blood Updated: 05/03/23 0838     Procalcitonin 0.13 ng/mL     Narrative:      As a Marker for Sepsis (Non-Neonates):    1. <0.5 ng/mL represents a low risk of severe sepsis and/or septic shock.  2. >2 ng/mL represents a high risk of severe sepsis and/or septic shock.    As a Marker for Lower Respiratory Tract Infections that require antibiotic therapy:    PCT on Admission    Antibiotic Therapy       6-12 Hrs later    >0.5                Strongly Recommended  >0.25 - <0.5        Recommended   0.1 - 0.25          Discouraged              Remeasure/reassess PCT  <0.1                Strongly Discouraged     Remeasure/reassess PCT    As 28 day mortality risk marker: \"Change in Procalcitonin Result\" (>80% or <=80%) if Day 0 (or Day 1) and Day 4 values are available. Refer to http://www.[employer]-pct-calculator.com    Change in PCT <=80%  A decrease of PCT levels below or equal to 80% defines a positive change in PCT test result " representing a higher risk for 28-day all-cause mortality of patients diagnosed with severe sepsis for septic shock.    Change in PCT >80%  A decrease of PCT levels of more than 80% defines a negative change in PCT result representing a lower risk for 28-day all-cause mortality of patients diagnosed with severe sepsis or septic shock.       Comprehensive Metabolic Panel [[id number]]  (Abnormal) Collected: 05/03/23 0800    Specimen: Blood Updated: 05/03/23 0834     Glucose 212 mg/dL      BUN 50 mg/dL      Creatinine 1.34 mg/dL      Sodium 139 mmol/L      Potassium 3.4 mmol/L      Chloride 94 mmol/L      CO2 30.0 mmol/L      Calcium 8.1 mg/dL      Total Protein 5.8 g/dL      Albumin 2.5 g/dL      ALT (SGPT) 16 U/L      AST (SGOT) 19 U/L      Alkaline Phosphatase 151 U/L      Total Bilirubin 0.5 mg/dL      Globulin 3.3 gm/dL      A/G Ratio 0.8 g/dL      BUN/Creatinine Ratio 37.3     Anion Gap 15.0 mmol/L      eGFR 58.1 mL/min/1.73     Narrative:      GFR Normal >60  Chronic Kidney Disease <60  Kidney Failure <15      Magnesium [[id number]]  (Abnormal) Collected: 05/03/23 0800    Specimen: Blood Updated: 05/03/23 0834     Magnesium 4.0 mg/dL     High Sensitivity Troponin T [[id number]]  (Abnormal) Collected: 05/03/23 0800    Specimen: Blood Updated: 05/03/23 0833     HS Troponin T 766 ng/L     Narrative:      High Sensitive Troponin T Reference Range:  <10.0 ng/L- Negative Female for AMI  <15.0 ng/L- Negative Male for AMI  >=10 - Abnormal Female indicating possible myocardial injury.  >=15 - Abnormal Male indicating possible myocardial injury.   Clinicians would have to utilize clinical acumen, EKG, Troponin, and serial changes to determine if it is an Acute Myocardial Infarction or myocardial injury due to an underlying chronic condition.         Phosphorus [[id number]]  (Normal) Collected: 05/03/23 0800    Specimen: Blood Updated: 05/03/23 0831     Phosphorus 3.6 mg/dL     BNP [[id number]]  (Abnormal) Collected: 05/03/23  0800    Specimen: Blood Updated: 05/03/23 0831     proBNP 18,190.0 pg/mL     Narrative:      Among patients with dyspnea, NT-proBNP is highly sensitive for the detection of acute congestive heart failure. In addition NT-proBNP of <300 pg/ml effectively rules out acute congestive heart failure with 99% negative predictive value.    Results may be falsely decreased if patient taking Biotin.      POC Glucose Once [[id number]]  (Abnormal) Collected: 05/03/23 0526    Specimen: Blood Updated: 05/03/23 0537     Glucose 235 mg/dL      Comment: : [id number] Gage DallasMeter ID: [id number]       Blood Gas, Arterial - [[id number]]  (Abnormal) Collected: 05/03/23 0356    Specimen: Arterial Blood Updated: 05/03/23 0355     Site Right Radial     Marek's Test Positive     pH, Arterial 7.559 pH units      Comment: 83 Value above reference range        pCO2, Arterial 41.0 mm Hg      pO2, Arterial 55.2 mm Hg      Comment: 84 Value below reference range        HCO3, Arterial 36.5 mmol/L      Comment: 83 Value above reference range        Base Excess, Arterial 13.1 mmol/L      Comment: 83 Value above reference range        O2 Saturation, Arterial 90.0 %      Comment: 84 Value below reference range        Temperature 37.0 C      Barometric Pressure for Blood Gas 745 mmHg      Modality Ventilator     FIO2 30 %      Ventilator Mode AC     Set Tidal Volume 550     Set Mech Resp Rate 12.0     PEEP 5.0     Collected by LOBO     Comment: Meter: [id number]     :  LOBO        pCO2, Temperature Corrected 41.0 mm Hg      pH, Temp Corrected 7.559 pH Units      pO2, Temperature Corrected 55.2 mm Hg     Blood Culture - Blood, Arm, Right [[id number]]  (Normal) Collected: 04/28/23 0130    Specimen: Blood from Arm, Right Updated: 05/03/23 0200     Blood Culture No growth at 5 days    Blood Culture - Blood, Arm, Right [[id number]]  (Normal) Collected: 04/28/23 0100    Specimen: Blood from Arm, Right Updated: 05/03/23 0200      Blood Culture No growth at 5 days    POC Glucose Once [[id number]]  (Abnormal) Collected: 05/03/23 0014    Specimen: Blood Updated: 05/03/23 0025     Glucose 189 mg/dL      Comment: : [id number] Gage Anayadaniel ID: [id number]       Blood Culture With SIMRAN - Blood, Hand, Right [[id number]]  (Normal) Collected: 04/29/23 1756    Specimen: Blood from Hand, Right Updated: 05/02/23 1831     Blood Culture No growth at 3 days    Blood Culture With SIMRAN - Blood, Arm, Left [[id number]]  (Normal) Collected: 04/29/23 1757    Specimen: Blood from Arm, Left Updated: 05/02/23 1831     Blood Culture No growth at 3 days    POC Glucose Once [[id number]]  (Abnormal) Collected: 05/02/23 1754    Specimen: Blood Updated: 05/02/23 1805     Glucose 139 mg/dL      Comment: : [id number] Garry Skinner ID: [id number]       POC Glucose Once [[id number]]  (Abnormal) Collected: 05/02/23 1150    Specimen: Blood Updated: 05/02/23 1211     Glucose 207 mg/dL      Comment: : [id number] [place] Skinner ID: [id number]           Flaco Portillo MD

## 2023-05-03 NOTE — PROCEDURES
"Insert Central Line At Bedside    Date/Time: 5/3/2023 8:04 AM  Performed by: Joey Moore DO  Authorized by: Joey Moore DO   Consent: The procedure was performed in an emergent situation. Verbal consent not obtained. Written consent not obtained.  Required items: required blood products, implants, devices, and special equipment available  Patient identity confirmed: arm band  Time out: Immediately prior to procedure a \"time out\" was called to verify the correct patient, procedure, equipment, support staff and site/side marked as required.  Indications: vascular access  Preparation: skin prepped with ChloraPrep  Skin prep agent dried: skin prep agent completely dried prior to procedure  Location details: right femoral  Patient position: flat  Catheter size: 7 Fr  Pre-procedure: landmarks identified  Ultrasound guidance: no  Number of attempts: 1  Successful placement: yes  Post-procedure: line sutured and dressing applied  Assessment: blood return through all ports and free fluid flow  Patient tolerance: patient tolerated the procedure well with no immediate complications      Code blue. I did Chloraprep the right femoral triangle, but I was not gowned and did not have sterile gloves given the emergent nature of the situation.       Electronically signed by Joey Moore DO, 05/03/23, 8:05 AM CDT.    "

## 2023-05-03 NOTE — PROCEDURES
"    [facility] Medicine CODE Note       Date of Encounter: 05/03/23    Attending Physician: Dr. Portillo with neurosurgery.     Description of Events:   This patient has been on the neurosurgical service since 3/31.  I had seen him previously in consultation.  Dr. Singh took over and then ultimately signed off.    I was called to the CCU this morning after he developed ventricular flutter/fibrillation.  He did not have a pulse.  Compressions were in process by the nursing staff.  We gave him epinephrine.  On subsequent check, we defibrillated him.  We then gave him bicarbonate, magnesium.  He had several more courses of epinephrine and 2 further defibrillations.  He did have an organized rhythm between each defibrillation event, but had quickly become tachycardic and gone back into a ventricular rhythm again.  After the third defibrillation and 300 mg of IV amiodarone, he has now been having an organized rhythm with atrial fibrillation in the 90s, 100s on telemetry.    Dr. Banerjee and Dr. Portillo came to the bedside.  They have asked that I can reconsult.  They also told me that the patient had a PEA arrest recently.  He does have longstanding problems with atrial fibrillation.  Dr. Portillo agrees with consulting electrophysiology.  I will also obtain a limited echocardiogram at the bedside.    Labs pending.    I also placed a central line during the course of the code.  Please see the separate note for that.    Vital Signs: /73   Pulse 58   Temp 97.8 °F (36.6 °C) (Axillary)   Resp 21   Ht 177.8 cm (70\")   Wt 126 kg (277 lb 11.2 oz)   SpO2 97%   BMI 39.85 kg/m²   Physical Exam  Vented by trach.   Has a G tube.   Obese.   Unresponsive during the code. Started shaking his head and responding after ROSC.   I placed a right femoral CVC.   Rhythm is now atrial fibrillation in the 90s.    Outcome: ROSC.     Electronically signed by Joey Moore DO, 5/3/2023, 08:06 " CDT.

## 2023-05-04 NOTE — PROGRESS NOTES
[facility] [place] PULMONARY AND CRITICAL CARE PROGRESS NOTE - [facility]    Patient: Elijah Escobar    1955    MR# [id number]    Acct# [id number]  05/04/23   08:02 CDT  Referring Provider: Flaco Portillo, *    Chief Complaint: Mechanically ventilated    Interval history: The patient remains intubated with trach to [facility] vent. Precedex and amiodarone infusing. He opens eyes but does not follow commands. He appears uncomfortable and thrashing. He is moving RLE. O2 sat 99% on 5 PEEP, 0.50 FiO2. ABGs reviewed, FiO2 already decreased from 1.0 to 0.50 PTA. CXR shows no change. TF remains on hold 2' code event yesterday. No other aggravating or alleviating factors.     Meds:  aspirin, 81 mg, Oral, Daily  atorvastatin, 20 mg, Oral, Nightly  budesonide, 0.5 mg, Nebulization, BID - RT  Chlorhexidine Gluconate Cloth, 1 application, Topical, Q24H  furosemide, 40 mg, Intravenous, Daily  heparin (porcine), 5,000 Units, Subcutaneous, Q12H  insulin detemir, 25 Units, Subcutaneous, Q12H  insulin lispro, 0-14 Units, Subcutaneous, Q6H  ipratropium, 0.5 mg, Nebulization, 4x Daily - RT  levETIRAcetam, 500 mg, Oral, BID  methylnaltrexone, 12 mg, Subcutaneous, Every Other Day  metoprolol succinate XL, 50 mg, Oral, Q24H  multivitamin with minerals, 1 tablet, Oral, Daily  pantoprazole, 40 mg, Intravenous, Q AM  polyethylene glycol, 17 g, Oral, Daily  sennosides-docusate, 1 tablet, Oral, Daily  sodium chloride, 10 mL, Intravenous, Q12H      dexmedetomidine, 0.2-1.5 mcg/kg/hr, Last Rate: 0.5 mcg/kg/hr (05/04/23 0752)  esmolol,  mcg/kg/min  propofol, 5-50 mcg/kg/min, Last Rate: Stopped (04/30/23 1130)        Ventilator Settings:        Resp Rate (Set): 16  Pressure Support (cm H2O): 10 cm H20  FiO2 (%): 50 %  PEEP/CPAP (cm H2O): 5 cm H20  Minute Ventilation (L/min) (Obs): 12.3 L/min  Resp Rate (Observed) Vent: 28  I:E Ratio (Set): 1:4.45  I:E Ratio (Obs): 1.1:1  PIP Observed (cm H2O): 19 cm H2O  RSBI: 0  Physical  Exam:  Temp:  [97.1 °F (36.2 °C)-99.6 °F (37.6 °C)] 97.4 °F (36.3 °C)  Heart Rate:  [] 78  Resp:  [19-35] 30  BP: ()/(56-94) 82/62  FiO2 (%):  [30 %-100 %] 50 %    Intake/Output Summary (Last 24 hours) at 5/4/2023 0802  Last data filed at 5/4/2023 0405  Gross per 24 hour   Intake 743.18 ml   Output 600 ml   Net 143.18 ml     SpO2 Percentage    05/04/23 0550 05/04/23 0610 05/04/23 0620   SpO2: 97% 96% 97%   Body mass index is 40 kg/m².   Physical Exam   Constitutional:       Appearance: He is obese. He is ill-appearing. He appears uncomfortable. Intubated, restrained. No ventilator dyssynchrony.  HENT:      Head: Normocephalic. Right scalp wound, well-healed, no redness warmth or drainage     Nose: Nose normal.      Mouth/Throat: trach/TF  Eyes:      General:         Right eye: No discharge.         Left eye: No discharge.   Cardiovascular:      Rate: normal rate.   Pulmonary:      Effort: increased work of breathing      Breath sounds: No wheezing.  Diminished in bases  Abdominal:      General: PEG tube, Abdomen is distended.   Musculoskeletal:      General: Bilateral wrist restraints, SCDs      Right lower leg: Edema      Left lower leg: Edema   Skin:     General: Skin is warm and dry.      Coloration: Skin is not jaundiced or pale.   Neurological:      General: Intubated, opens eyes, does not follow commands    Electronically signed by ADITYA Foss, 5/4/2023, 08:02 CDT      Physician substantive portion: medical decision making    Result Review    Laboratory Data:  Results from last 7 days   Lab Units 05/04/23  0816 05/03/23  0800 05/01/23  0004   WBC 10*3/mm3 18.47* 17.06* 12.05*   HEMOGLOBIN g/dL 8.6* 10.1* 9.1*   PLATELETS 10*3/mm3 219 253 281     Results from last 7 days   Lab Units 05/04/23  0817 05/04/23  0031 05/03/23  0800 05/03/23  0755 05/02/23  0852 05/01/23  0004   SODIUM mmol/L 140  --  139  --  136 138   POTASSIUM mmol/L 3.7 5.0 3.4*  --  3.7 3.8   CO2 mmol/L 32.0*  --  30.0*   --  30.0* 31.0*   BUN mg/dL 71*  --  50*  --  56* 57*   CREATININE mg/dL 1.93*  --  1.34*  --  1.41* 1.56*   LACTATE mmol/L  --   --   --  6.9*  --   --    CRP mg/dL  --   --   --   --   --  12.72*     Results from last 7 days   Lab Units 05/04/23  0437 05/03/23  0851 05/03/23  0356   PH, ARTERIAL pH units 7.532* 7.181* 7.559*   PCO2, ARTERIAL mm Hg 41.1 59.8* 41.0   PO2 ART mm Hg 348.0* 59.0* 55.2*   FIO2 % 100 100 30     Microbiology Results (last 10 days)     Procedure Component Value - Date/Time    Respiratory Culture - Aspirate, ET Suction [[id number]]  (Abnormal)  (Susceptibility) Collected: 04/29/23 1954    Lab Status: Final result Specimen: Aspirate from ET Suction Updated: 05/02/23 0902     Respiratory Culture Light growth (2+) Staphylococcus aureus, MRSA     Comment:   Methicillin resistant Staphylococcus aureus, Patient may be an isolation risk.         No Normal Respiratory Asmita     Gram Stain Many (4+) WBCs per low power field      Few (2+) Epithelial cells per low power field      Rare (1+) Gram positive cocci    Susceptibility      Staphylococcus aureus, MRSA      STALIN      Clindamycin Resistant     Inducible Clindamycin Resistance Negative      Linezolid Susceptible      Oxacillin Resistant     Tetracycline Resistant     Trimethoprim + Sulfamethoxazole Resistant     Vancomycin Susceptible                           Blood Culture With SIMRAN - Blood, Arm, Left [[id number]]  (Normal) Collected: 04/29/23 1757    Lab Status: Preliminary result Specimen: Blood from Arm, Left Updated: 05/03/23 1830     Blood Culture No growth at 4 days    Blood Culture With SIMRAN - Blood, Hand, Right [[id number]]  (Normal) Collected: 04/29/23 1756    Lab Status: Preliminary result Specimen: Blood from Hand, Right Updated: 05/03/23 1830     Blood Culture No growth at 4 days    MRSA Screen, PCR (Inpatient) - Swab, Nares [[id number]]  (Abnormal) Collected: 04/29/23 1748    Lab Status: Final result Specimen: Swab from Nares Updated:  04/29/23 1849     MRSA PCR MRSA Detected    Narrative:      The negative predictive value of this diagnostic test is high and should only be used to consider de-escalating anti-MRSA therapy. A positive result may indicate colonization with MRSA and must be correlated clinically.    Blood Culture - Blood, Arm, Right [[id number]]  (Normal) Collected: 04/28/23 0130    Lab Status: Final result Specimen: Blood from Arm, Right Updated: 05/03/23 0200     Blood Culture No growth at 5 days    Blood Culture - Blood, Arm, Right [[id number]]  (Normal) Collected: 04/28/23 0100    Lab Status: Final result Specimen: Blood from Arm, Right Updated: 05/03/23 0200     Blood Culture No growth at 5 days         Recent films:  XR Chest 1 View    Result Date: 5/4/2023  EXAMINATION: XR CHEST 1 VW- 5/4/2023 7:25 AM CDT  HISTORY: Intubated and sedated; S06.4X0A-Epidural hemorrhage without loss of consciousness, initial encounter; Z74.09-Other reduced mobility; Z98.890-Other specified postprocedural states; T81.42XA-Infection following a procedure, deep incisional surgical site, initial encounter; 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  COMPARISON: Chest x-ray 05/03/2023 0327 hours.  The tracheostomy tube appears in satisfactory position as before. The lungs remain somewhat hypoaerated, there is diffuse hazy pulmonary infiltrate/edema and consolidation of the left base, unchanged. The heart is enlarged. No pneumothorax is identified. A small left pleural effusion is not excluded. No acute osseous abnormality.      Impression: Stable 1 day appearance of the chest. This report was finalized on 05/04/2023 07:27 by Dr. Elijah Grover MD.    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  COMPARISON: Chest x-ray 05/02/2023 0308 hours.  The tracheostomy tube appears in satisfactory position as before. There is respiratory motion artifact, moderate volume loss is present in the lung bases and there are are extensive bilateral pulmonary infiltrates, centrally predominant and slightly asymmetric and greater on the right. Mild cardiomegaly is present. No pneumothorax or large pleural effusion is identified. The osseous structures are unremarkable.      Impression: CHF with mild improvement in the extent of pulmonary edema within the left lung. Stable satisfactory position of the tracheostomy tube. This report was finalized on 05/03/2023 07:32 by Dr. Elijah Grover MD.    Adult Transthoracic Echo Limited W/ Cont if Necessary Per Protocol    Result Date: 5/3/2023  •  Left ventricular systolic function is severely decreased. Left ventricular ejection fraction appears to be 21 - 25%. •  Moderately reduced right ventricular systolic function noted.      Personal review of imaging: CXR shows trache tube ok, pulm edema, L HD silhouette sign, possible effusion and or consolidation    Pulmonary Assessment:  1. Acute resp failure with hypoxia  2. S/p cardiac arrest  3. Hx brain tumor  4. afib    Recommend/plan:   · Not candidate for spontaneous breathing trial  · Continue diuresis  · Continue vent no change; pt currently driving resp rate over vent set rate  · Defer abx decisions to ID    This visit was performed by both a physician and an Advanced Practice RN.  I performed all aspects of the medical decision making as documented.  Electronically signed by Ajay Root MD,  5/4/2023, 15:02 CDT

## 2023-05-04 NOTE — PROGRESS NOTES
RT EQUIPMENT DEVICE RELATED - SKIN ASSESSMENT    RT Medical Equipment/Device:     Tracheostomy    Skin Assessment:      Neck:  Incision and Intact    Device Skin Pressure Protection:  Skin-to-device areas padded:  Optifoam    Nurse Notification:  Kylie Moseley, RRT

## 2023-05-04 NOTE — CASE MANAGEMENT/SOCIAL WORK
Continued Stay Note   [place]     Patient Name: Elijah Escobar  MRN: [id number]  Today's Date: 5/4/2023    Admit Date: 3/31/2023    Plan: Pending   Discharge Plan     Row Name 05/04/23 0849       Plan    Plan Pending    Plan Comments LTAC referral on hold at this time.  Palliative care consult pending.  SW following.               Discharge Codes    No documentation.                     TIANA Syed

## 2023-05-04 NOTE — PLAN OF CARE
Pt does not follow commands but does withdraw from pain. Patient is still mainly only moving lower extremities, but does occassionally move upper right arm. Pt was a little restless when nurse arrived onto the shift, and continued to become agitated. Pt's HR got up to 160's and BP was 150/90's. Morphine was administered for the patients discomfort and that did not help. Patient was still restless/agitated and HR remained -160's after morphine was administered.  was notified and order Esmolol if needed and suggested sedation. Patient was started on Precedex, and Esmolol was not needed nor started. Patient has remained calm and relaxed with sedation and HR 's and BP stable. Precedex at 0.4 and Amiodarone continued at 0.5.  UOP has been  with shift with 325ml's total. Vent settings remain at FiO2 at 50%, Peep of 5, and Rate at 16. Patient has remained afebrile and patient safety maintained this shift.     Problem: Adult Inpatient Plan of Care  Goal: Plan of Care Review  Outcome: Ongoing, Progressing  Goal: Patient-Specific Goal (Individualized)  Outcome: Ongoing, Progressing  Goal: Absence of Hospital-Acquired Illness or Injury  Outcome: Ongoing, Progressing  Intervention: Identify and Manage Fall Risk  Recent Flowsheet Documentation  Taken 2023 0405 by Janice Singh RN  Safety Promotion/Fall Prevention: safety round/check completed  Taken 2023 0302 by Janice Singh RN  Safety Promotion/Fall Prevention: safety round/check completed  Taken 2023 0202 by Janice Singh RN  Safety Promotion/Fall Prevention: safety round/check completed  Taken 2023 0105 by Janice Singh RN  Safety Promotion/Fall Prevention: safety round/check completed  Taken 2023 0005 by Janice Singh RN  Safety Promotion/Fall Prevention: safety round/check completed  Taken 5/3/2023 2300 by Janice Singh RN  Safety Promotion/Fall Prevention: safety round/check completed  Taken  5/3/2023 2205 by Janice Singh RN  Safety Promotion/Fall Prevention: safety round/check completed  Taken 5/3/2023 2100 by Janice Singh RN  Safety Promotion/Fall Prevention: safety round/check completed  Taken 5/3/2023 2000 by Janice Singh RN  Safety Promotion/Fall Prevention: safety round/check completed  Taken 5/3/2023 1900 by Janice Singh RN  Safety Promotion/Fall Prevention: safety round/check completed  Intervention: Prevent Skin Injury  Recent Flowsheet Documentation  Taken 5/4/2023 0405 by Janice Singh RN  Body Position:   turned   right  Taken 5/4/2023 0202 by Janice Singh RN  Body Position:   turned   right  Taken 5/4/2023 0005 by Janice Singh RN  Body Position:   turned   supine  Skin Protection:   adhesive use limited   incontinence pads utilized   tubing/devices free from skin contact   skin-to-skin areas padded   skin-to-device areas padded  Taken 5/3/2023 2205 by Janice Singh RN  Body Position:   turned   left  Taken 5/3/2023 2000 by Janice Singh RN  Body Position:   turned   right  Skin Protection:   adhesive use limited   incontinence pads utilized   tubing/devices free from skin contact   skin-to-skin areas padded   skin-to-device areas padded  Intervention: Prevent and Manage VTE (Venous Thromboembolism) Risk  Recent Flowsheet Documentation  Taken 5/4/2023 0405 by Janice Singh RN  Activity Management: bedrest  VTE Prevention/Management:   bilateral   sequential compression devices on  Range of Motion: ROM (range of motion) performed  Taken 5/4/2023 0202 by Janice Singh RN  Activity Management: bedrest  Taken 5/4/2023 0005 by Janice Singh RN  Activity Management: bedrest  VTE Prevention/Management:   bilateral   sequential compression devices on  Range of Motion: ROM (range of motion) performed  Taken 5/3/2023 2205 by Janice Singh RN  Activity Management: bedrest  Taken 5/3/2023 2000 by Janice Singh RN  Activity  Management: bedrest  VTE Prevention/Management:   bilateral   sequential compression devices on  Range of Motion: ROM (range of motion) performed  Goal: Optimal Comfort and Wellbeing  Outcome: Ongoing, Progressing  Intervention: Provide Person-Centered Care  Recent Flowsheet Documentation  Taken 5/4/2023 0405 by Janice Singh RN  Trust Relationship/Rapport:   care explained   choices provided  Taken 5/4/2023 0005 by Janice Singh RN  Trust Relationship/Rapport:   care explained   choices provided  Taken 5/3/2023 2000 by Janice Singh RN  Trust Relationship/Rapport:   care explained   choices provided  Goal: Readiness for Transition of Care  Outcome: Ongoing, Progressing     Problem: Diabetes Comorbidity  Goal: Blood Glucose Level Within Targeted Range  Outcome: Ongoing, Progressing     Problem: Hypertension Comorbidity  Goal: Blood Pressure in Desired Range  Outcome: Ongoing, Progressing     Problem: Fall Injury Risk  Goal: Absence of Fall and Fall-Related Injury  Outcome: Ongoing, Progressing  Intervention: Promote Injury-Free Environment  Recent Flowsheet Documentation  Taken 5/4/2023 0405 by Janice Singh RN  Safety Promotion/Fall Prevention: safety round/check completed  Taken 5/4/2023 0302 by Janice Singh RN  Safety Promotion/Fall Prevention: safety round/check completed  Taken 5/4/2023 0202 by Janice Singh RN  Safety Promotion/Fall Prevention: safety round/check completed  Taken 5/4/2023 0105 by Janice Singh RN  Safety Promotion/Fall Prevention: safety round/check completed  Taken 5/4/2023 0005 by Janice Singh RN  Safety Promotion/Fall Prevention: safety round/check completed  Taken 5/3/2023 2300 by Janice Singh RN  Safety Promotion/Fall Prevention: safety round/check completed  Taken 5/3/2023 2205 by Janice Singh RN  Safety Promotion/Fall Prevention: safety round/check completed  Taken 5/3/2023 2100 by Janice Singh RN  Safety Promotion/Fall  Prevention: safety round/check completed  Taken 5/3/2023 2000 by Janice Singh RN  Safety Promotion/Fall Prevention: safety round/check completed  Taken 5/3/2023 1900 by Janice Singh RN  Safety Promotion/Fall Prevention: safety round/check completed     Problem: Skin Injury Risk Increased  Goal: Skin Health and Integrity  Outcome: Ongoing, Progressing  Intervention: Optimize Skin Protection  Recent Flowsheet Documentation  Taken 5/4/2023 0405 by Janice Singh RN  Head of Bed ([facility]) Positioning: HOB at 30-45 degrees  Taken 5/4/2023 0202 by Janice Singh RN  Head of Bed ([facility]) Positioning: HOB at 30-45 degrees  Taken 5/4/2023 0005 by Janice Singh RN  Pressure Reduction Techniques:   weight shift assistance provided   heels elevated off bed  Head of Bed ([facility]) Positioning: HOB at 30-45 degrees  Pressure Reduction Devices: pressure-redistributing mattress utilized  Skin Protection:   adhesive use limited   incontinence pads utilized   tubing/devices free from skin contact   skin-to-skin areas padded   skin-to-device areas padded  Taken 5/3/2023 2205 by Janice Singh RN  Head of Bed ([facility]) Positioning: HOB at 20-30 degrees  Taken 5/3/2023 2000 by Janice Singh RN  Pressure Reduction Techniques: heels elevated off bed  Head of Bed ([facility]) Positioning: HOB at 30-45 degrees  Pressure Reduction Devices: pressure-redistributing mattress utilized  Skin Protection:   adhesive use limited   incontinence pads utilized   tubing/devices free from skin contact   skin-to-skin areas padded   skin-to-device areas padded     Problem: Restraint, Nonviolent  Goal: Absence of Harm or Injury  Outcome: Ongoing, Progressing  Intervention: Implement Least Restrictive Safety Strategies  Recent Flowsheet Documentation  Taken 5/4/2023 0405 by Janice Singh RN  Diversional Activities: television  Taken 5/4/2023 0005 by Janice Singh RN  Diversional Activities: television  Taken 5/3/2023 2000 by Francisco  SELENA Camacho  Diversional Activities: television  Intervention: Protect Dignity, Rights, and Personal Wellbeing  Recent Flowsheet Documentation  Taken 5/4/2023 0405 by Janice Singh RN  Trust Relationship/Rapport:   care explained   choices provided  Taken 5/4/2023 0005 by Janice Singh RN  Trust Relationship/Rapport:   care explained   choices provided  Taken 5/3/2023 2000 by Janice Singh RN  Trust Relationship/Rapport:   care explained   choices provided  Intervention: Protect Skin and Joint Integrity  Recent Flowsheet Documentation  Taken 5/4/2023 0405 by Janice Singh RN  Body Position:   turned   right  Range of Motion: ROM (range of motion) performed  Taken 5/4/2023 0202 by Janice Singh RN  Body Position:   turned   right  Taken 5/4/2023 0005 by Janice Singh RN  Body Position:   turned   supine  Range of Motion: ROM (range of motion) performed  Taken 5/3/2023 2205 by Janice Singh RN  Body Position:   turned   left  Taken 5/3/2023 2000 by Janice Singh RN  Body Position:   turned   right  Range of Motion: ROM (range of motion) performed     Problem: Communication Impairment (Mechanical Ventilation, Invasive)  Goal: Effective Communication  Outcome: Ongoing, Progressing     Problem: Device-Related Complication Risk (Mechanical Ventilation, Invasive)  Goal: Optimal Device Function  Outcome: Ongoing, Progressing     Problem: Inability to Wean (Mechanical Ventilation, Invasive)  Goal: Mechanical Ventilation Liberation  Outcome: Ongoing, Progressing     Problem: Nutrition Impairment (Mechanical Ventilation, Invasive)  Goal: Optimal Nutrition Delivery  Outcome: Ongoing, Progressing     Problem: Skin and Tissue Injury (Mechanical Ventilation, Invasive)  Goal: Absence of Device-Related Skin and Tissue Injury  Outcome: Ongoing, Progressing  Intervention: Maintain Skin and Tissue Health  Recent Flowsheet Documentation  Taken 5/4/2023 0005 by Janice Singh RN  Device Skin  Pressure Protection:   absorbent pad utilized/changed   skin-to-skin areas padded   skin-to-device areas padded  Taken 5/3/2023 2000 by Janice Singh RN  Device Skin Pressure Protection:   absorbent pad utilized/changed   skin-to-skin areas padded   skin-to-device areas padded     Problem: Ventilator-Induced Lung Injury (Mechanical Ventilation, Invasive)  Goal: Absence of Ventilator-Induced Lung Injury  Outcome: Ongoing, Progressing  Intervention: Prevent Ventilator-Associated Pneumonia  Recent Flowsheet Documentation  Taken 5/4/2023 0405 by Janice Singh RN  [facility] of Bed ([facility] Positioning: HOB at 30-45 degrees  VAP Prevention Bundle: HOB elevation maintained  VAP Prevention Measures: completed  Oral Care: swabbed with antiseptic solution  Taken 5/4/2023 0202 by Janice Singh RN  Head of Bed ([facility] [place] Positioning: HOB at 30-45 degrees  Taken 5/4/2023 0005 by Janice Singh RN  Head of Bed ([facility] [place] Positioning: HOB at 30-45 degrees  VAP Prevention Bundle: HOB elevation maintained  VAP Prevention Measures: completed  Taken 5/3/2023 2205 by Janice Singh RN  Head of Bed ([facility] [place] Positioning: HOB at 20-30 degrees  Taken 5/3/2023 2000 by Janice Singh RN  Head of Bed ([facility] [place] Positioning: HOB at 30-45 degrees  VAP Prevention Bundle: HOB elevation maintained  VAP Prevention Measures: completed     Problem: Aspiration (Enteral Nutrition)  Goal: Absence of Aspiration Signs and Symptoms  Outcome: Ongoing, Progressing  Intervention: Minimize Aspiration Risk  Recent Flowsheet Documentation  Taken 5/4/2023 0405 by Janice Singh RN  Head of Bed ([facility] [place] Positioning: HOB at 30-45 degrees  Oral Care: swabbed with antiseptic solution  Taken 5/4/2023 0202 by Janice Singh RN  Head of Bed ([facility] [place] Positioning: HOB at 30-45 degrees  Taken 5/4/2023 0005 by Janice Singh RN  Head of Bed ([facility] [place] Positioning: HOB at 30-45 degrees  Taken 5/3/2023 2205 by Janice Singh RN  Head of Bed ([facility] [place] Positioning: HOB at  20-30 degrees  Taken 5/3/2023 2000 by Janice Singh, RN  Head of Bed ([facility]) Positioning: HOB at 30-45 degrees     Problem: Device-Related Complication Risk (Enteral Nutrition)  Goal: Safe, Effective Therapy Delivery  Outcome: Ongoing, Progressing     Problem: Feeding Intolerance (Enteral Nutrition)  Goal: Feeding Tolerance  Outcome: Ongoing, Progressing     Problem: Adjustment to Surgery (Craniotomy/Craniectomy/Cranioplasty)  Goal: Optimal Coping with Surgery  Outcome: Ongoing, Progressing  Intervention: Support Psychosocial Response to Surgery  Recent Flowsheet Documentation  Taken 5/4/2023 0405 by Janice Singh, RN  Family/Support System Care: support provided  Taken 5/4/2023 0005 by Janice Singh RN  Family/Support System Care: support provided  Taken 5/3/2023 2000 by Janice Singh RN  Family/Support System Care: support provided     Problem: Bleeding (Craniotomy/Craniectomy/Cranioplasty)  Goal: Absence of Bleeding  Outcome: Ongoing, Progressing     Problem: Bowel Motility Impaired (Craniotomy/Craniectomy/Cranioplasty)  Goal: Effective Bowel Elimination  Outcome: Ongoing, Progressing     Problem: Cerebral Tissue Perfusion Risk (Craniotomy/Craniectomy/Cranioplasty)  Goal: Optimal Cerebral Tissue Perfusion  Outcome: Ongoing, Progressing  Intervention: Protect and Optimize Cerebral Perfusion  Recent Flowsheet Documentation  Taken 5/3/2023 2000 by Janice Singh RN  Cerebral Perfusion Promotion: blood pressure monitored     Problem: Fluid and Electrolyte Imbalance (Craniotomy/Craniectomy/Cranioplasty)  Goal: Fluid and Electrolyte Balance  Outcome: Ongoing, Progressing     Problem: Functional Ability Impaired (Craniotomy/Craniectomy/Cranioplasty)  Goal: Optimal Functional Ability  Outcome: Ongoing, Progressing  Intervention: Optimize Functional Ability  Recent Flowsheet Documentation  Taken 5/4/2023 0405 by Janice Singh, RN  Activity Management: bedrest  Taken 5/4/2023 0202 by Francisco  SELENA Camacho  Activity Management: bedrest  Taken 5/4/2023 0005 by Janice Singh RN  Activity Management: bedrest  Taken 5/3/2023 2205 by Janice Singh RN  Activity Management: bedrest  Taken 5/3/2023 2000 by Janice Singh RN  Activity Management: bedrest     Problem: Infection (Craniotomy/Craniectomy/Cranioplasty)  Goal: Absence of Infection Signs and Symptoms  Outcome: Ongoing, Progressing     Problem: Ongoing Anesthesia Effects (Craniotomy/Craniectomy/Cranioplasty)  Goal: Anesthesia/Sedation Recovery  Outcome: Ongoing, Progressing     Problem: Pain (Craniotomy/Craniectomy/Cranioplasty)  Goal: Acceptable Pain Control  Outcome: Ongoing, Progressing  Intervention: Prevent or Manage Pain  Recent Flowsheet Documentation  Taken 5/4/2023 0405 by Janice Singh RN  Diversional Activities: television  Taken 5/4/2023 0005 by Janice Singh RN  Diversional Activities: television  Taken 5/3/2023 2000 by Janice Singh RN  Diversional Activities: television     Problem: Postoperative Nausea and Vomiting (Craniotomy/Craniectomy/Cranioplasty)  Goal: Nausea and Vomiting Relief  Outcome: Ongoing, Progressing     Problem: Postoperative Urinary Retention (Craniotomy/Craniectomy/Cranioplasty)  Goal: Effective Urinary Elimination  Outcome: Ongoing, Progressing     Problem: Respiratory Compromise (Craniotomy/Craniectomy/Cranioplasty)  Goal: Effective Oxygenation and Ventilation  Outcome: Ongoing, Progressing  Intervention: Optimize Oxygenation and Ventilation  Recent Flowsheet Documentation  Taken 5/4/2023 0405 by Janice Singh RN  Head of Bed ([facility]) Positioning: HOB at 30-45 degrees  Taken 5/4/2023 0202 by Janice Singh RN  Head of Bed ([facility]) Positioning: HOB at 30-45 degrees  Taken 5/4/2023 0005 by Janice Singh RN  Head of Bed ([facility]) Positioning: HOB at 30-45 degrees  Airway/Ventilation Management: airway patency maintained  Taken 5/3/2023 2205 by Janice Singh RN  Head of Bed ([facility])  Positioning: HOB at 20-30 degrees  Taken 5/3/2023 2000 by Janice Singh, RN  Head of Bed ([facility]) Positioning: HOB at 30-45 degrees  Airway/Ventilation Management: airway patency maintained   Goal Outcome Evaluation:

## 2023-05-04 NOTE — PLAN OF CARE
Goal Outcome Evaluation:  Plan of Care Reviewed With: patient, other (see comments)        Progress: no change  Outcome Evaluation: Ntn follow up. Pt remains on vent support. TF was placed on hold yesterday d/t VFib arrest. TF resumed this morning per RN. Pt cont to receive Diabetisource AC at 65mL/hour with a 25mL/hour free water flush. TF meets 81% of est'd kcal needs and 96% of est'd protein needs. Will cont to follow for nutrition POC.

## 2023-05-04 NOTE — PROGRESS NOTES
Neurosurgery Daily Progress Note    HPI:  Elijah Escobar is a 67 y.o. male with a significant medical history of hypertension, diabetes, hyperlipidemia, seizures, craniotomy for tumor (6/16/2022), craniotomy for washout (7/1/2022), craniectomy (10/4/2022), coronary artery disease, diabetes, erectile dysfunction, GERD, hypertension, hyperlipidemia, tobacco abuse, and obesity.  He presents today with a complaint of a 4-5 days onset of progressively worsening right frontal, temporal, and periorbital edema and associated symptoms to include, but not limited to generalized fatigue, chills, dyspnea, intermittent nausea without vomiting, and states he's felt feverish.  Physical exam findings of neurologically intact with right frontal, temporal, and periorbital swelling.  WBC elevated at 15.  3 to an CRP elevated at 14.75.  Imaging pending.    Assessment:   Past Medical History:   Diagnosis Date   • Brain tumor    • Coronary artery disease    • COVID-19 vaccine series completed     MODERNA X 3; LAST DOSE 3/2022   • Diabetes    • Erectile dysfunction    • GERD (gastroesophageal reflux disease)    • Hypercholesteremia    • Hypertension    • Seizure      Active Hospital Problems    Diagnosis    • **Ventricular fibrillation arrest (5/3/23)    • PEA (Pulseless electrical activity) arrest (4/16/23)    • COPD (chronic obstructive pulmonary disease)    • Obesity (BMI 30-39.9)    • Tobacco abuse, in remission    • Acute systolic heart failure    • ADDIE (obstructive sleep apnea)    • Type 2 myocardial infarction due to arrhythmia    • Acute respiratory failure with hypoxia    • Swelling of scalp    • Intracranial epidural hematoma    • Post-operative infection    • Paroxysmal atrial fibrillation with rapid ventricular response    • Type 2 diabetes mellitus with hyperglycemia and peripheral neuropathy, with long-term current use of insulin (HCC)    • Coronary artery disease    • Meningioma s/p craniotomy      Plan:   Neuro: No  change.  Does not open eyes to voice.  Does not follow commands.  Minimal motor response to painful stimuli.  + gag and cough.     Infected cranial flap      POD #27 (4/4/2023)  craniectomy and washout    Postop code CT and MRI stable. No acute abnormalities.    Heavy 4+ Serratia marcescens     POD #20 (4/13/2023) evacuation of scalp hematoma    Continue neuro exams per policy.  Call for decline  EEG shows no epileptiform activity or ongoing seizures    9 Days Post-Op (4/25/2023) tracheostomy and PEG tube placement.      CV: Code for PEA, 4/16/2023 and 5/3/2023.     AFib/flutter, rate controlled.   ASA 81mg and resume heparin prophylaxis today.    No evidence of DVT or PE per imaging   Appreciate cardiology  Pulm: Ventilation per trach   Increased pulmonary edema per CXR.    : Condom cath  FEN: Enteral feeding per PEG tube  Endocrine: Continue SSI  GI: SHERIF.  +BM  ID:  Infected cranial flap  CSF: no growth to date   PNA.  Resolved   UTI.  Resolved   MRSA +   Antibiotics per ID currently on Levaquin and Zyvox  Heme:  DVT prophylaxis with SCDs and Heparin  Pain: No complaints at present  Dispo: PT/OT   Palliative care consulted.  Family has elected to de-escalate to no CPR no defibrillation.     Chief complaint:   4-5 days onset of progressively worsening right frontal, temporal, and periorbital edema and associated symptoms to include, but not limited to generalized fatigue, chills, dyspnea, intermittent nausea without vomiting, and states he's felt feverish.    HPI  Subjective:  Symptoms:  Stable.      Temp:  [97.1 °F (36.2 °C)-99.6 °F (37.6 °C)] 97.4 °F (36.3 °C)  Heart Rate:  [] 78  Resp:  [19-35] 30  BP: ()/(56-94) 82/62  FiO2 (%):  [30 %-100 %] 50 %    Output by Drain (mL) 05/03/23 0701 - 05/03/23 1900 05/03/23 1901 - 05/04/23 0700 05/04/23 0701 - 05/04/23 0804 Range Total   Requested LDAs do not have output data documented.     Objective:  Vital signs: (most recent): Blood pressure (!) 82/62, pulse  "78, temperature 97.4 °F (36.3 °C), temperature source Axillary, resp. rate (!) 30, height 177.8 cm (70\"), weight 126 kg (278 lb 12.8 oz), SpO2 97 %.      Neurologic Exam     Mental Status     Does not open eyes to voice.  Does not follow commands.       Cranial Nerves     CN III, IV, VI   Pupils are equal, round, and reactive to light.    CN VII   Right facial weakness: none  Left facial weakness: none    CN IX, X   Right gag reflex: normal  Left gag reflex: normal    Motor Exam   Minimal motor response to pain to painful stimuli.       Drains: * No LDAs found *    Imaging Results (Last 24 Hours)     Procedure Component Value Units Date/Time    XR Chest 1 View [[id number]] Collected: 05/04/23 0725     Updated: 05/04/23 0730    Narrative:      EXAMINATION: XR CHEST 1 VW- 5/4/2023 7:25 AM CDT     HISTORY: Intubated and sedated; S06.4X0A-Epidural hemorrhage without  loss of consciousness, initial encounter; Z74.09-Other reduced mobility;  Z98.890-Other specified postprocedural states; T81.42XA-Infection  following a procedure, deep incisional surgical site, initial encounter;  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     COMPARISON: Chest x-ray 05/03/2023 0327 hours.     The tracheostomy tube appears in satisfactory position as before. The  lungs remain somewhat hypoaerated, there is diffuse hazy pulmonary  infiltrate/edema and consolidation of the left base, unchanged. The  heart is enlarged. No pneumothorax is identified. A small left pleural  effusion is not excluded. No acute osseous abnormality.       Impression:      Stable 1 day appearance of the chest.  This report was finalized on 05/04/2023 07:27 by Dr. Elijah Grover MD.        Lab Results (last 24 hours)     Procedure Component Value Units Date/Time    POC Glucose Once [[id number]]  (Normal) Collected: 05/04/23 0636    Specimen: Blood Updated: " 05/04/23 0647     Glucose 88 mg/dL      Comment: : [id number] Francisco ByrneidaMeter ID: [id number]       Blood Gas, Arterial - [[id number]]  (Abnormal) Collected: 05/04/23 0437    Specimen: Arterial Blood Updated: 05/04/23 0436     Site Right Radial     Marek's Test Positive     pH, Arterial 7.532 pH units      Comment: 83 Value above reference range        pCO2, Arterial 41.1 mm Hg      pO2, Arterial 348.0 mm Hg      Comment: 83 Value above reference range        HCO3, Arterial 34.5 mmol/L      Comment: 83 Value above reference range        Base Excess, Arterial 10.8 mmol/L      Comment: 83 Value above reference range        O2 Saturation, Arterial >100.1 %      Comment: 93 Value above reportable range > 100.1        Temperature 37.0 C      Barometric Pressure for Blood Gas 749 mmHg      Modality Ventilator     FIO2 100 %      Ventilator Mode AC     Set Tidal Volume 550     Set Mech Resp Rate 16.0     PEEP 5.0     Collected by [id number]     Comment: Meter: [id number]     :  [id number]        pCO2, Temperature Corrected 41.1 mm Hg      pH, Temp Corrected 7.532 pH Units      pO2, Temperature Corrected 348 mm Hg     Potassium [[id number]]  (Normal) Collected: 05/04/23 0031    Specimen: Blood Updated: 05/04/23 0054     Potassium 5.0 mmol/L      Comment: Slight hemolysis detected by analyzer. Results may be affected.       POC Glucose Once [[id number]]  (Abnormal) Collected: 05/04/23 0005    Specimen: Blood Updated: 05/04/23 0017     Glucose 154 mg/dL      Comment: : [id number] Francisco ByrneidaMeter ID: [id number]       POC Glucose Once [[id number]]  (Abnormal) Collected: 05/03/23 2056    Specimen: Blood Updated: 05/03/23 2109     Glucose 170 mg/dL      Comment: : [id number] Francisco ByrneidaMeter ID: [id number]       Blood Culture With SIMRAN - Blood, Hand, Right [[id number]]  (Normal) Collected: 04/29/23 1756    Specimen: Blood from Hand, Right Updated: 05/03/23 1830     Blood Culture No growth at 4 days     Blood Culture With SIMRAN - Blood, Arm, Left [[id number]]  (Normal) Collected: 04/29/23 1757    Specimen: Blood from Arm, Left Updated: 05/03/23 1830     Blood Culture No growth at 4 days    POC Glucose Once [[id number]]  (Abnormal) Collected: 05/03/23 1652    Specimen: Blood Updated: 05/03/23 1703     Glucose 202 mg/dL      Comment: : [email or web address] Giordano RaileyMeter ID: [id number]       POC Glucose Once [[id number]]  (Abnormal) Collected: 05/03/23 1138    Specimen: Blood Updated: 05/03/23 1149     Glucose 229 mg/dL      Comment: : [email or web address] Giordano RaileyMeter ID: [id number]       High Sensitivity Troponin T 2Hr [[id number]]  (Abnormal) Collected: 05/03/23 1011    Specimen: Blood Updated: 05/03/23 1052     HS Troponin T 1,029 ng/L      Troponin T Delta 263 ng/L     Narrative:      High Sensitive Troponin T Reference Range:  <10.0 ng/L- Negative Female for AMI  <15.0 ng/L- Negative Male for AMI  >=10 - Abnormal Female indicating possible myocardial injury.  >=15 - Abnormal Male indicating possible myocardial injury.   Clinicians would have to utilize clinical acumen, EKG, Troponin, and serial changes to determine if it is an Acute Myocardial Infarction or myocardial injury due to an underlying chronic condition.         POC Glucose Once [[id number]]  (Abnormal) Collected: 05/03/23 1016    Specimen: Blood Updated: 05/03/23 1037     Glucose 274 mg/dL      Comment: : [id number] Phoenix Skinner ID: [id number]       Blood Gas, Arterial - [[id number]]  (Abnormal) Collected: 05/03/23 0851    Specimen: Arterial Blood Updated: 05/03/23 0849     Site Right Brachial     Marek's Test N/A     pH, Arterial 7.181 pH units      Comment: 85 Value below critical limit        pCO2, Arterial 59.8 mm Hg      Comment: 83 Value above reference range        pO2, Arterial 59.0 mm Hg      Comment: 84 Value below reference range        HCO3, Arterial 22.3 mmol/L      Base Excess, Arterial -6.3 mmol/L      Comment: 84 Value below  reference range        O2 Saturation, Arterial 77.3 %      Comment: 84 Value below reference range        Temperature 37.0 C      Barometric Pressure for Blood Gas 748 mmHg      Modality Ventilator     FIO2 100 %      Ventilator Mode AC     Set Tidal Volume 550     Set Mech Resp Rate 12.0     PEEP 5.0     Notified Who DR COULTER     Notified By [id number]     Notified Time 05/03/2023 08:52     Collected by [id number]     Comment: Meter: [id number]     :  [id number]        pCO2, Temperature Corrected 59.8 mm Hg      pH, Temp Corrected 7.181 pH Units      pO2, Temperature Corrected 59.0 mm Hg     Lactic Acid, Plasma [[id number]]  (Abnormal) Collected: 05/03/23 0755    Specimen: Blood Updated: 05/03/23 0847     Lactate 6.9 mmol/L     Manual Differential [[id number]]  (Abnormal) Collected: 05/03/23 0800    Specimen: Blood Updated: 05/03/23 0845     Neutrophil % 73.0 %      Lymphocyte % 14.0 %      Monocyte % 6.0 %      Bands %  5.0 %      Atypical Lymphocyte % 2.0 %      Neutrophils Absolute 13.31 10*3/mm3      Lymphocytes Absolute 2.73 10*3/mm3      Monocytes Absolute 1.02 10*3/mm3      Crenated RBC's Slight/1+     Hypochromia Slight/1+     Poikilocytes Slight/1+     Polychromasia Slight/1+     WBC Morphology Normal     Platelet Morphology Normal    CBC & Differential [[id number]]  (Abnormal) Collected: 05/03/23 0800    Specimen: Blood Updated: 05/03/23 0845    Narrative:      The following orders were created for panel order CBC & Differential.  Procedure                               Abnormality         Status                     ---------                               -----------         ------                     CBC Auto Differential[[id number]]        Abnormal            Final result                 Please view results for these tests on the individual orders.    CBC Auto Differential [[id number]]  (Abnormal) Collected: 05/03/23 0800    Specimen: Blood Updated: 05/03/23 0845     WBC 17.06 10*3/mm3      RBC 3.39  "10*6/mm3      Hemoglobin 10.1 g/dL      Hematocrit 33.5 %      MCV 98.8 fL      MCH 29.8 pg      MCHC 30.1 g/dL      RDW 14.6 %      RDW-SD 52.8 fl      MPV 11.3 fL      Platelets 253 10*3/mm3     Narrative:      The previously reported component NRBC is no longer being reported. Previous result was 1.3 /100 WBC (Reference Range: 0.0-0.2 /100 WBC) on 5/3/2023 at 0821 [facility].    Procalcitonin [[id number]]  (Normal) Collected: 05/03/23 0800    Specimen: Blood Updated: 05/03/23 0838     Procalcitonin 0.13 ng/mL     Narrative:      As a Marker for Sepsis (Non-Neonates):    1. <0.5 ng/mL represents a low risk of severe sepsis and/or septic shock.  2. >2 ng/mL represents a high risk of severe sepsis and/or septic shock.    As a Marker for Lower Respiratory Tract Infections that require antibiotic therapy:    PCT on Admission    Antibiotic Therapy       6-12 Hrs later    >0.5                Strongly Recommended  >0.25 - <0.5        Recommended   0.1 - 0.25          Discouraged              Remeasure/reassess PCT  <0.1                Strongly Discouraged     Remeasure/reassess PCT    As 28 day mortality risk marker: \"Change in Procalcitonin Result\" (>80% or <=80%) if Day 0 (or Day 1) and Day 4 values are available. Refer to http://[email or web address]-[email or web address]-[email or web address]    Change in PCT <=80%  A decrease of PCT levels below or equal to 80% defines a positive change in PCT test result representing a higher risk for 28-day all-cause mortality of patients diagnosed with severe sepsis for septic shock.    Change in PCT >80%  A decrease of PCT levels of more than 80% defines a negative change in PCT result representing a lower risk for 28-day all-cause mortality of patients diagnosed with severe sepsis or septic shock.       Comprehensive Metabolic Panel [[id number]]  (Abnormal) Collected: 05/03/23 0800    Specimen: Blood Updated: 05/03/23 0834     Glucose 212 mg/dL      BUN 50 mg/dL      Creatinine 1.34 mg/dL      Sodium 139 mmol/L      " Potassium 3.4 mmol/L      Chloride 94 mmol/L      CO2 30.0 mmol/L      Calcium 8.1 mg/dL      Total Protein 5.8 g/dL      Albumin 2.5 g/dL      ALT (SGPT) 16 U/L      AST (SGOT) 19 U/L      Alkaline Phosphatase 151 U/L      Total Bilirubin 0.5 mg/dL      Globulin 3.3 gm/dL      A/G Ratio 0.8 g/dL      BUN/Creatinine Ratio 37.3     Anion Gap 15.0 mmol/L      eGFR 58.1 mL/min/1.73     Narrative:      GFR Normal >60  Chronic Kidney Disease <60  Kidney Failure <15      Magnesium [[id number]]  (Abnormal) Collected: 05/03/23 0800    Specimen: Blood Updated: 05/03/23 0834     Magnesium 4.0 mg/dL     High Sensitivity Troponin T [[id number]]  (Abnormal) Collected: 05/03/23 0800    Specimen: Blood Updated: 05/03/23 0833     HS Troponin T 766 ng/L     Narrative:      High Sensitive Troponin T Reference Range:  <10.0 ng/L- Negative Female for AMI  <15.0 ng/L- Negative Male for AMI  >=10 - Abnormal Female indicating possible myocardial injury.  >=15 - Abnormal Male indicating possible myocardial injury.   Clinicians would have to utilize clinical acumen, EKG, Troponin, and serial changes to determine if it is an Acute Myocardial Infarction or myocardial injury due to an underlying chronic condition.         Phosphorus [[id number]]  (Normal) Collected: 05/03/23 0800    Specimen: Blood Updated: 05/03/23 0831     Phosphorus 3.6 mg/dL     BNP [[id number]]  (Abnormal) Collected: 05/03/23 0800    Specimen: Blood Updated: 05/03/23 0831     proBNP 18,190.0 pg/mL     Narrative:      Among patients with dyspnea, NT-proBNP is highly sensitive for the detection of acute congestive heart failure. In addition NT-proBNP of <300 pg/ml effectively rules out acute congestive heart failure with 99% negative predictive value.    Results may be falsely decreased if patient taking Biotin.          Rahul Arnold, APRN

## 2023-05-04 NOTE — PROGRESS NOTES
[facility] Medicine Services  INPATIENT PROGRESS NOTE    Patient Name: Elijah Escobar  Date of Admission: 3/31/2023  Today's Date: 05/04/23  Length of Stay: 34  Primary Care Physician: Brianna Martinez APRN    Subjective   Chief Complaint: Status post vfib arrest.   HPI   Labs pending.     EF down:   Results for orders placed during the hospital encounter of 03/31/23    Adult Transthoracic Echo Limited W/ Cont if Necessary Per Protocol    Interpretation Summary  •  Left ventricular systolic function is severely decreased. Left ventricular ejection fraction appears to be 21 - 25%.  •  Moderately reduced right ventricular systolic function noted.    I ordered a CTA of the chest yesterday that has not been done.     Will need the femoral CVC changed over to a PICC or PIV soon.     Code status changed to no CPR, but family wants to stay aggressive otherwise.     Review of Systems   Unable to assess secondary to the patient's intubated and sedated state.      Objective    Temp:  [97.1 °F (36.2 °C)-99.6 °F (37.6 °C)] 97.4 °F (36.3 °C)  Heart Rate:  [] 78  Resp:  [19-35] 30  BP: ()/(56-94) 82/62  FiO2 (%):  [30 %-100 %] 50 %  Physical Exam  Constitutional:       Comments: Discussed with his nurse, Lori. No family at the bedside.     HENT:      Head: Normocephalic and atraumatic.   Eyes:      Conjunctiva/sclera: Conjunctivae normal.      Pupils: Pupils are equal, round, and reactive to light.   Neck:      Vascular: No JVD.      Comments: Trach.  Cardiovascular:      Rate and Rhythm: Rhythm irregular.      Heart sounds: Normal heart sounds.      Comments: Afib in the 80s.   Pulmonary: Vented by trach.      Breath sounds: Rhonchi present.   Abdominal:      General: Bowel sounds are normal. There is no distension.      Palpations: Abdomen is soft.      Tenderness: There is no abdominal tenderness.      Comments: PEG in place.    Musculoskeletal: Right femoral CVC placed during  the code on 5/3.      General: Swelling (trace) present. No tenderness or deformity.    Skin:     General: Skin is warm and dry.      Findings: No rash.   Neurological:      General: No focal deficit present.      Motor: No abnormal muscle tone.      Comments: Moves extremities to noxious stimuli. Thrashes at times.      Intake/Output Summary (Last 24 hours) at 5/4/2023 0748  Last data filed at 5/4/2023 0405  Gross per 24 hour   Intake 743.18 ml   Output 600 ml   Net 143.18 ml     Results Review:  I have reviewed the labs, radiology results, and diagnostic studies.    Laboratory Data:   Results from last 7 days   Lab Units 05/03/23  0800 05/01/23  0004 04/30/23  0819   WBC 10*3/mm3 17.06* 12.05* 13.26*   HEMOGLOBIN g/dL 10.1* 9.1* 9.2*   HEMATOCRIT % 33.5* 30.1* 29.7*   PLATELETS 10*3/mm3 253 281 286     Results from last 7 days   Lab Units 05/04/23  0031 05/03/23  0800 05/02/23  0852 05/01/23  0004 04/30/23  0819   SODIUM mmol/L  --  139 136 138 136   POTASSIUM mmol/L 5.0 3.4* 3.7 3.8 3.9   CHLORIDE mmol/L  --  94* 94* 96* 92*   CO2 mmol/L  --  30.0* 30.0* 31.0* 31.0*   BUN mg/dL  --  50* 56* 57* 53*   CREATININE mg/dL  --  1.34* 1.41* 1.56* 1.41*   CALCIUM mg/dL  --  8.1* 7.8* 8.0* 7.8*   BILIRUBIN mg/dL  --  0.5  --  0.6 0.7   ALK PHOS U/L  --  151*  --  134* 151*   ALT (SGPT) U/L  --  16  --  19 22   AST (SGOT) U/L  --  19  --  25 38   GLUCOSE mg/dL  --  212* 148* 115* 232*     Culture Data:   Blood Culture   Date Value Ref Range Status   04/29/2023 No growth at 4 days  Preliminary   04/29/2023 No growth at 4 days  Preliminary   04/28/2023 No growth at 5 days  Final   04/28/2023 No growth at 5 days  Final     Respiratory Culture   Date Value Ref Range Status   04/29/2023 Light growth (2+) Staphylococcus aureus, MRSA (A)  Final     Comment:       Methicillin resistant Staphylococcus aureus, Patient may be an isolation risk.   04/29/2023 No Normal Respiratory Asmita (A)  Final     Radiology Data:   Imaging Results  (Last 24 Hours)     Procedure Component Value Units Date/Time    XR Chest 1 View [[id number]] Collected: 05/04/23 0725     Updated: 05/04/23 0730    Narrative:      EXAMINATION: XR CHEST 1 VW- 5/4/2023 7:25 AM CDT     HISTORY: Intubated and sedated; S06.4X0A-Epidural hemorrhage without  loss of consciousness, initial encounter; Z74.09-Other reduced mobility;  Z98.890-Other specified postprocedural states; T81.42XA-Infection  following a procedure, deep incisional surgical site, initial encounter;  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     COMPARISON: Chest x-ray 05/03/2023 0327 hours.     The tracheostomy tube appears in satisfactory position as before. The  lungs remain somewhat hypoaerated, there is diffuse hazy pulmonary  infiltrate/edema and consolidation of the left base, unchanged. The  heart is enlarged. No pneumothorax is identified. A small left pleural  effusion is not excluded. No acute osseous abnormality.       Impression:      Stable 1 day appearance of the chest.  This report was finalized on 05/04/2023 07:27 by Dr. Elijah Grover MD.        Results from last 7 days   Lab Units 05/04/23  0437   PH, ARTERIAL pH units 7.532*   PO2 ART mm Hg 348.0*   PCO2, ARTERIAL mm Hg 41.1   HCO3 ART mmol/L 34.5*   FiO2 (%):  [30 %-100 %] 50 %  S RR:  [16] 16  PEEP/CPAP (cm H2O):  [5 cm H20] 5 cm H20  MAP (cm H2O):  [10-13] 10    I have reviewed the patient's current medications.     Assessment/Plan   Assessment  Active Hospital Problems    Diagnosis    • **Ventricular fibrillation arrest (5/3/23)    • Swelling of scalp    • Intracranial epidural hematoma    • Post-operative infection    • Meningioma s/p craniotomy    • PEA (Pulseless electrical activity) arrest (4/16/23)    • Acute systolic heart failure    • Type 2 myocardial infarction due to arrhythmia    • Paroxysmal atrial fibrillation with rapid  ventricular response    • Coronary artery disease    • Type 2 diabetes mellitus with hyperglycemia and peripheral neuropathy, with long-term current use of insulin (HCC)    • COPD (chronic obstructive pulmonary disease)    • Obesity (BMI 30-39.9)    • Tobacco abuse, in remission    • ADDIE (obstructive sleep apnea)    • Acute respiratory failure with hypoxia      Treatment Plan  The patient was admitted on 3/31 by Dr. Portillo.  He has been in the hospital for well over a month at this point.  He has a history of craniotomy, craniectomy.  He has had 2 separate episodes of this becoming infected.  He on this hospitalization has undergone repeat craniotomy, has been on prolonged antibiotic therapy.  He suffered a PEA arrest on 4/16.  He underwent tracheostomy and PEG tube placement on 4/25. He developed ventricular fibrillation leading to a cardiac arrest on the morning of 5/3.      Successfully resuscitated after 3 episodes of defibrillation and IV amiodarone. He was given bicarbonate and magnesium during resuscitative efforts as well.     Would be concerned about possible QT prolongation given the fact that he has recently been on fluoroquinolone antibiotics; stopped.  I was also concerned about worsening systolic function and indeed his EF is down to 21-25% after being 46-50% on 3/31/23.  He did also have a worsening BNP and also had a significant troponin elevation.  Dr. Portillo wanted to consult cardiac electrophysiology to evaluate him. Dr. Trinidad saw and adjusted medications. Diltiazem and digoxin stopped.  Metoprolol tartrate transitioned to Toprol-XL.  He also stated that he could consider LHC, device placement in the future if he survives and has some recovery of function. He has also previously been seen by general cardiology (Dr. Samaniego).  He has been receiving digoxin, diltiazem, metoprolol.     He has not been on anticoagulation recently.  He had previously been on therapeutic Lovenox. He has been off of  this since 4/12.  He has been on an aspirin.  If he remains relatively stable, would send him for a CTA of the chest. This was ordered on 5/3 and has not been obtained.      He has lactic acidosis postarrest.  Expected finding. No signs of hemodynamic instability at present.     His renal function has been stable recently.  His serum creatinine has typically been on the order of 1.2 throughout the course of his hospitalization.     He has been followed by pulmonary.  They have been managing ventilatory support.  He is vented via tracheostomy.      He is diabetic.  Most recent blood sugars 170, 154, 88. His most recent hemoglobin A1c was 11.2 at the start of April.     Palliative care was consulted by the primary service. His code status has been changed to no CPR.      Medical Decision Making  Number and Complexity of problems: 1 acute, highly complex problem in the form of his ventricular fibrillation arrest that threatens bodily function and life.  Differential Diagnosis: He had been on levofloxacin recently and may have had difficulty with QT prolongation. He also has a history of atrial dysrhythmia. Confirmed worsening systolic dysfunction.      Conditions and Status        Condition is worsening.     MDM Data  External documents reviewed: Reviewed prior [facility] records.  Cardiac tracing (EKG, telemetry) interpretation: Atrial fibrillation with rates in the 80s.  Radiology interpretation: As above.  Labs reviewed: As above.  Any tests that were considered but not ordered: None at present.      Decision rules/scores evaluated (example [id number], Wells, etc): None considered at present.     Discussed with: His nurse, Lori.      Care Planning  Shared decision making: His family has consented to ongoing treatment.   Code status and discussions: His family has apparently been insistent that he remain a full code with full interventions.     Disposition  Social Determinants of Health that impact treatment or  disposition: Prolonged hospitalization.  I expect the patient to be discharged by the primary service.      I confirmed that the patient's Advance Care Plan is present, code status is documented, or surrogate decision maker is listed in the patient's medical record.     Electronically signed by Joey Moore DO, 05/04/23, 07:47 CDT.

## 2023-05-04 NOTE — PROGRESS NOTES
[facility] Palliative Care Services  Progress Note  Patient Name: Elijah Escobar  Date of Admission: 3/31/2023  Today's Date: 05/04/23     Code Status and Medical Interventions:   Ordered at: 05/03/23 1316     Medical Intervention Limits:    NO cardioversion     Code Status (Patient has no pulse and is not breathing):    No CPR (Do Not Attempt to Resuscitate)     Medical Interventions (Patient has pulse or is breathing):    Limited Support     Release to patient:    Routine Release     Subjective   Chief complaint/Reason for Referral/Visit: Follow up on Goals of Care/Advance Care Planning.    Medical record reviewed. Events noted.  Labs collected this morning reveal worsening renal function and LFTs.  He remains intubated and sedated.  No family at bedside.      Advance Care Planning    Advanced Directives: No advance directive on file.      Call placed to patient's son, Cathy, as Mr. Escobar remains intubated and sedated and unable to demonstrate ability to make complex medical decisions.  Followed up with Cathy regarding discussion yesterday.  He reports he was able to speak with other members of his family including his step-mother (patient's spouse) and shared again they were all agreeable to continue NO CPR and no cardioversion/defibrillation.  Unable to update patient's spouse via telephone as she is deaf however Cathy reports they have continued to provide her updates through other means of communication.  Reports he has not been to visit this morning but plans to in the next couple of hours.  Provided update.  Also discussed alternative treamtnet options including transitioning to more comfort focused care if he continues to decline/no improvement and family wish to do so.  He expressed appreciation of update and discussion. He reports plans to continue with current measures.     The patient receives support from his spouse, children and extended family.  Due to the palliative care  topics discussed including goals of care and treatment options we will establish an advance care plan.    Goals of care: Ongoing.    Review of Systems   Unable to perform ROS: intubated     Pain Assessment  Nonverbal Indicators of Pain: muscle tension, grimace  CPOT and PAINAD Scales: CPOT (Critical-Care Pain Observation Tool)  CPOT Facial Expression: 1-->tense  CPOT Body Movements: 2-->restlessness  CPOT Muscle Tension: 1-->tense, rigid  Ventilator Compliance/Vocalization: 0-->tolerating ventilator or movement  CPOT Score: 4  PAINAD Breathin-->normal  PAINAD Negative Vocalization: 0-->none  PAINAD Facial Expression: 0-->smiling or inexpressive  PAINAD Body Language: 0-->relaxed  PAINAD Consolability: 0-->no need to console  PAINAD Score: 0  Pain Location: head, other (see comments)  Pain Description: aching  Objective   Diagnostics: Reviewed      Intake/Output Summary (Last 24 hours) at 2023 1105  Last data filed at 2023 0405  Gross per 24 hour   Intake 674.5 ml   Output 425 ml   Net 249.5 ml     Current Facility-Administered Medications   Medication Dose Route Frequency Provider Last Rate Last Admin   • acetaminophen (TYLENOL) tablet 650 mg  650 mg Oral Q4H PRN Wagner Villarreal MD   650 mg at 04/15/23 1152    Or   • acetaminophen (TYLENOL) 160 MG/5ML solution 650 mg  650 mg Oral Q4H PRN Wagner Villarreal MD   650 mg at 23 2259    Or   • acetaminophen (TYLENOL) suppository 650 mg  650 mg Rectal Q4H PRN Wagner Villarreal MD       • aspirin chewable tablet 81 mg  81 mg Oral Daily Wagner Villarreal MD   81 mg at 23 0904   • budesonide (PULMICORT) nebulizer solution 0.5 mg  0.5 mg Nebulization BID - RT Wagner Villarreal MD   0.5 mg at 23 0610   • butalbital-acetaminophen-caffeine (FIORICET, ESGIC) -40 MG per tablet 1 tablet  1 tablet Oral Q6H PRN Wagner Villarreal MD   1 tablet at 23 1132   • calcium carbonate (TUMS) chewable tablet 500 mg (200 mg elemental)   2 tablet Oral TID PRN Wagner Villarreal MD   2 tablet at 04/15/23 1131   • Chlorhexidine Gluconate Cloth 2 % pads 1 application  1 application Topical Q24H Wagner Villarreal MD   1 application at 05/04/23 0508   • dexmedetomidine (PRECEDEX) 400 mcg in 100 mL NS infusion  0.2-1.5 mcg/kg/hr Intravenous Titrated Tracy Gupta MD 16.3 mL/hr at 05/04/23 0752 0.5 mcg/kg/hr at 05/04/23 0752   • dextrose (D50W) (25 g/50 mL) IV injection 25 g  25 g Intravenous Q15 Min PRN Wagner Villarreal MD   25 g at 04/13/23 2121   • dextrose (GLUTOSE) oral gel 15 g  15 g Oral Q15 Min PRN Wagner Villarreal MD       • furosemide (LASIX) injection 40 mg  40 mg Intravenous Daily Tracy Gupta MD   40 mg at 05/04/23 0904   • glucagon (human recombinant) (GLUCAGEN DIAGNOSTIC) injection 1 mg  1 mg Intramuscular Q15 Min PRN Wagner Villarreal MD       • heparin (porcine) 5000 UNIT/ML injection 5,000 Units  5,000 Units Subcutaneous Q12H Flaco Portillo MD   5,000 Units at 05/04/23 0904   • hydrOXYzine (ATARAX) tablet 25 mg  25 mg Oral TID PRN Wagner Villarreal MD   25 mg at 04/16/23 1139   • insulin detemir (LEVEMIR) injection 25 Units  25 Units Subcutaneous Q12H Wagner Villarreal MD   25 Units at 05/04/23 0905   • Insulin Lispro (humaLOG) injection 0-14 Units  0-14 Units Subcutaneous Q6H Wagner Villarreal MD   3 Units at 05/04/23 0016   • ipratropium (ATROVENT) nebulizer solution 0.5 mg  0.5 mg Nebulization 4x Daily PRN Wagner Villarreal MD       • ipratropium (ATROVENT) nebulizer solution 0.5 mg  0.5 mg Nebulization 4x Daily - RT Wagner Villarreal MD   0.5 mg at 05/04/23 1042   • ipratropium-albuterol (DUO-NEB) nebulizer solution 3 mL  3 mL Nebulization Q4H PRN Wagner Villarreal MD   3 mL at 04/27/23 1021   • levETIRAcetam (KEPPRA) tablet 500 mg  500 mg Oral BID Wagner Villarreal MD   500 mg at 05/04/23 0904   • methylnaltrexone (RELISTOR) injection 12 mg  12 mg Subcutaneous Every Other Day  Wagner Villarreal MD   12 mg at 05/03/23 1019   • metoprolol tartrate (LOPRESSOR) tablet 25 mg  25 mg Oral Q12H Joey Moore DO   25 mg at 05/04/23 1021   • morphine injection 5 mg  5 mg Intravenous Q4H PRN Ramin Singh DO   5 mg at 05/04/23 1102   • multivitamin with minerals 1 tablet  1 tablet Oral Daily Wagner Villarreal MD   1 tablet at 05/04/23 0911   • nicotine (NICODERM CQ) 14 MG/24HR patch 1 patch  1 patch Transdermal Daily PRN Wagner Villarreal MD       • ondansetron (ZOFRAN) tablet 4 mg  4 mg Oral Q6H PRN Wagner Villarreal MD        Or   • ondansetron (ZOFRAN) injection 4 mg  4 mg Intravenous Q6H PRN Wagner Villarreal MD   4 mg at 04/25/23 0121   • pantoprazole (PROTONIX) injection 40 mg  40 mg Intravenous Q AM Wagner Villarreal MD   40 mg at 05/04/23 0649   • polyethylene glycol (MIRALAX) packet 17 g  17 g Oral Daily Wagner Villarreal MD   17 g at 05/04/23 0904   • potassium chloride (MICRO-K) CR capsule 40 mEq  40 mEq Oral PRN Joey Moore DO        Or   • potassium chloride (KLOR-CON) packet 40 mEq  40 mEq Oral PRN Joey Moore DO        Or   • potassium chloride 20 mEq in 50 mL IVPB  20 mEq Intravenous Q1H PRN Joey Moore DO 50 mL/hr at 05/03/23 1834 20 mEq at 05/03/23 1834   • propofol (DIPRIVAN) infusion 10 mg/mL 100 mL  5-50 mcg/kg/min Intravenous Titrated Wagner Villarreal MD   Stopped at 04/30/23 1130   • sennosides-docusate (PERICOLACE) 8.6-50 MG per tablet 1 tablet  1 tablet Oral Daily Wagner Villarreal MD   1 tablet at 05/04/23 0904   • sodium chloride 0.9 % flush 10 mL  10 mL Intravenous Q12H Wagner Villarreal MD   10 mL at 05/04/23 0904   • sodium chloride 0.9 % flush 10 mL  10 mL Intravenous ABDOULAYEN Wagner Villarreal MD   10 mL at 04/27/23 0542   • sodium chloride 0.9 % flush 10 mL  10 mL Intravenous Q12H Joey Moore, DO       • sodium chloride 0.9 % flush 10 mL  10 mL Intravenous Q12H Joey Moore, DO       • sodium chloride 0.9 %  "flush 10 mL  10 mL Intravenous Q12H Joey Moore DO       • sodium chloride 0.9 % flush 10 mL  10 mL Intravenous PRN Joey Moore DO       • sodium chloride 0.9 % flush 20 mL  20 mL Intravenous PRN Joey Moore DO       • sodium chloride 0.9 % infusion 40 mL  40 mL Intravenous PRN Wagner Villarreal MD       • sodium chloride 0.9 % infusion 40 mL  40 mL Intravenous PRN Joey Moore DO         dexmedetomidine, 0.2-1.5 mcg/kg/hr, Last Rate: 0.5 mcg/kg/hr (05/04/23 0752)  propofol, 5-50 mcg/kg/min, Last Rate: Stopped (04/30/23 1130)      •  acetaminophen **OR** acetaminophen **OR** acetaminophen  •  butalbital-acetaminophen-caffeine  •  calcium carbonate  •  dextrose  •  dextrose  •  glucagon (human recombinant)  •  hydrOXYzine  •  ipratropium  •  ipratropium-albuterol  •  Morphine  •  nicotine  •  ondansetron **OR** ondansetron  •  potassium chloride **OR** potassium chloride **OR** potassium chloride  •  sodium chloride  •  sodium chloride  •  sodium chloride  •  sodium chloride  •  sodium chloride  Current medications patient is presently taking including all prescriptions, over-the-counter, herbals and vitamin/mineral/dietary (nutritional) supplements with reviewed including route, type, dose and frequency and are current per MAR at time of dictation.    Assessment:  Vital Signs: /67   Pulse 75   Temp 97.4 °F (36.3 °C) (Axillary)   Resp (!) 30   Ht 177.8 cm (70\")   Wt 126 kg (278 lb 12.8 oz)   SpO2 96%   BMI 40.00 kg/m²     Physical Exam  Vitals and nursing note reviewed.   Constitutional:       Appearance: He is obese. He is ill-appearing.      Interventions: He is sedated and intubated.   Eyes:      General: Lids are normal.   Neck:      Trachea: Tracheostomy present.   Cardiovascular:      Rate and Rhythm: Normal rate.   Pulmonary:      Effort: He is intubated.      Comments: FiO2 50%, PEEP 5.  Abdominal:      Comments: PEG tube present.   Skin:     General: Skin is warm and dry. "   Neurological:      Comments: Remains intubated and sedated.     Functional status: Palliative Performance Scale Score: Performance 10% based on the following measures: Ambulation: Totally bed bound, Activity and Evidence of Disease: Unable to do any work, extensive evidence of disease, Self-Care: Total care required,  Intake: Mouth care only, LOC: Drowsy or comatose.  Nutritional status: Albumin 2.7. Body mass index is 40 kg/m².   Patient status: Disease state: Deteriorating despite treatments.    Impression/Problem List:  1. Acute systolic heart failure  2. Ventricular fibrillation arrest  3. Pulseless electrical activity arrest  4. Intracranial epidural hematoma s/p evacuation  5. Meningioma s/p craniotomy   6. Acute respiratory failure with hypoxia s/p tracheostomy  7. Type II myocardial infarction due to arrhythmia  8. Obstructive sleep apnea  9. Coronary artery disease  10. Type 2 diabetes mellitus with hyperglycemia and peripheral neuropathy with long-term current use of insulin  11. Tobacco abuse, in remission  12. COPD  13. Obesity  14. Paroxysmal atrial fibrillation with rapid ventricular response  15. Presence of PEG tube    Plan / Recommendations     1. Palliative Care Encounter   • Goals of care include CODE STATUS NO CPR with limited support interventions.     • Prognosis is poor long-term secondary to meningioma s/p craniotomy, V-fib arrest, PEA arrest, intracranial epidural hematoma s/p evacuation, acute systolic heart failure, acute respiratory failure with hypoxia requiring tracheostomy, type II myocardial infarction due to arrhythmia and other comorbidities listed above.     • Mr. Escobar remains intubated and sedated and unable to demonstrate ability to maek complex medical decisions at this time.      • Call placed to patient's son, Cathy, as Mr. Escobar spouse is deaf and unable to communicate via telephone.  Details of discussion above.     • Treatment options discussed including option of  transitioning to more comfort focused care if he continues to decline/no improvement.  He demonstrated understanding.     • Son expressed appreciation of update and discussion. He reports plans to continue with current measures at this time.     Thank you for allowing us to participate in patient's plan of care. Palliative Care Team will continue to follow patient.     Time spent:40 minutes spent reviewing medical and medication records, assessing and examining patient, discussing with family, answering questions, providing some guidance about a plan and documentation of care, and coordinating care with other healthcare members, with > 50% time spent face to face.     Electronically signed by, ADITYA oGnzalez, 05/04/23.

## 2023-05-04 NOTE — SIGNIFICANT NOTE
05/04/23 0610   Readings   PEEP Intrinsic (cm H2O) 4.9 cm H2O   Plateau Pressure (cm H2O) 14 cm H2O   Driving Pressure (cm H2O) 9.6 cm H2O   Static Compliance (L/cm H2O) 15   Dynamic Compliance (L/cm H2O) 58 L/cm H2O

## 2023-05-04 NOTE — CONSULTS
Patient or patient's representative gives informed consent after an explanation of the risks (air embolism; catheter occlusion; phlebitis; catheter infection; bloodstream infection; vein thrombosis; hematoma/bleeding at the insertion site; slight discomfort; accidental puncture of an artery, nerve, or tendon; dislodging of device), benefits (longer dwell time, outpatient treatment, medications that cannot run peripherally), and alternatives (short peripheral catheter, centrally inserted central line, or permanent catheter) of PICC placement. Time provided to ask and answer questions.    Pt had 5 [place] triple lumen PICC placed in left basilic vein. Pt tolerated procedure well. 53 cm of catheter internal and 2 cm external. Tip confirmation by 3cg. All lumens flush and draw well. Sterile dressing applied.    Attempted in left brachial vein first-pt was very restless and dislodged sheath from vein.

## 2023-05-04 NOTE — PROGRESS NOTES
Infectious Diseases Progress Note    Patient:  Elijah Escobar  YOB: 1955  MRN: [id number]   Admit date: 3/31/2023   Admitting Physician: Flaco Portillo, *  Primary Care Physician: Brianna Martinez APRN    Chief Complaint/Interval History: Remains on the ventilator.  Will open eyes some to stimulus but does not seem to track purposefully.  He is without fever.  His FiO2 is stable at 50%.    Intake/Output Summary (Last 24 hours) at 5/4/2023 1510  Last data filed at 5/4/2023 0405  Gross per 24 hour   Intake 547.4 ml   Output 425 ml   Net 122.4 ml     Allergies: No Known Allergies  Current Scheduled Medications:   aspirin, 81 mg, Oral, Daily  budesonide, 0.5 mg, Nebulization, BID - RT  Chlorhexidine Gluconate Cloth, 1 application, Topical, Q24H  furosemide, 40 mg, Intravenous, Daily  heparin (porcine), 5,000 Units, Subcutaneous, Q12H  insulin detemir, 25 Units, Subcutaneous, Q12H  insulin lispro, 0-14 Units, Subcutaneous, Q6H  ipratropium, 0.5 mg, Nebulization, 4x Daily - RT  levETIRAcetam, 500 mg, Oral, BID  metoprolol tartrate, 25 mg, Oral, Q12H  multivitamin with minerals, 1 tablet, Oral, Daily  pantoprazole, 40 mg, Intravenous, Q AM  polyethylene glycol, 17 g, Oral, Daily  sennosides-docusate, 1 tablet, Oral, Daily  sodium chloride, 10 mL, Intravenous, Q12H  sodium chloride, 10 mL, Intravenous, Q12H  sodium chloride, 10 mL, Intravenous, Q12H  sodium chloride, 10 mL, Intravenous, Q12H      Current PRN Medications:  •  acetaminophen **OR** acetaminophen **OR** acetaminophen  •  butalbital-acetaminophen-caffeine  •  calcium carbonate  •  dextrose  •  dextrose  •  glucagon (human recombinant)  •  hydrOXYzine  •  ipratropium  •  ipratropium-albuterol  •  Morphine  •  nicotine  •  ondansetron **OR** ondansetron  •  potassium chloride **OR** potassium chloride **OR** potassium chloride  •  sodium chloride  •  sodium chloride  •  sodium chloride  •  sodium chloride  •  sodium chloride    Review of  "Systems see HPI    Vital Signs:  /69   Pulse 78   Temp 97.4 °F (36.3 °C) (Axillary)   Resp 18   Ht 177.8 cm (70\")   Wt 126 kg (278 lb 12.8 oz)   SpO2 97%   BMI 40.00 kg/m²     Physical Exam  Vital signs - reviewed.  Line/IV site - No erythema, warmth, induration, or tenderness.  Lungs without crackles or wheezes  Abdomen is soft.  Bowel sounds present.  Extremities edematous  Previous cranioplasty site without induration or drainage    Lab Results:  CBC:   Results from last 7 days   Lab Units 05/04/23  0816 05/03/23  0800 05/01/23  0004 04/30/23  0819 04/29/23  1756   WBC 10*3/mm3 18.47* 17.06* 12.05* 13.26* 13.30*   HEMOGLOBIN g/dL 8.6* 10.1* 9.1* 9.2* 9.0*   HEMATOCRIT % 28.3* 33.5* 30.1* 29.7* 29.6*   PLATELETS 10*3/mm3 219 253 281 286 298     BMP:  Results from last 7 days   Lab Units 05/04/23  0817 05/04/23  0031 05/03/23  0800 05/02/23  0852 05/01/23  0004 04/30/23  0819 04/29/23  0642 04/28/23  0014   SODIUM mmol/L 140  --  139 136 138 136 135* 138   POTASSIUM mmol/L 3.7 5.0 3.4* 3.7 3.8 3.9 3.9 4.2   CHLORIDE mmol/L 96*  --  94* 94* 96* 92* 94* 95*   CO2 mmol/L 32.0*  --  30.0* 30.0* 31.0* 31.0* 27.0 30.0*   BUN mg/dL 71*  --  50* 56* 57* 53* 44* 29*   CREATININE mg/dL 1.93*  --  1.34* 1.41* 1.56* 1.41* 1.36* 1.25   GLUCOSE mg/dL 95  --  212* 148* 115* 232* 194* 196*   CALCIUM mg/dL 7.9*  --  8.1* 7.8* 8.0* 7.8* 8.1* 8.3*   ALT (SGPT) U/L 141*  --  16  --  19 22 12 15     Culture Results:   Blood Culture   Date Value Ref Range Status   04/29/2023 No growth at 4 days  Preliminary   04/29/2023 No growth at 4 days  Preliminary   04/28/2023 No growth at 5 days  Final   04/28/2023 No growth at 5 days  Final     Respiratory Culture   Date Value Ref Range Status   04/29/2023 Light growth (2+) Staphylococcus aureus, MRSA (A)  Final     Comment:       Methicillin resistant Staphylococcus aureus, Patient may be an isolation risk.   04/29/2023 No Normal Respiratory Asmita (A)  Final     Radiology: " None  Additional Studies Reviewed: None    Impression:   1.  Previous infection with Serratia cranioplasty site.  Completed antibiotic treatment.  Stable off antibiotic treatment at present.  2.  Cardiac arrest-hemodynamically stable postevent at present.  3.  Systolic congestive heart failure with a EF of 21 to 25%  4.  Renal insufficiency  5.  Diabetes mellitus  6.  Previous meningioma resection    Recommendations:   Stable off antibiotic treatment at present  Monitor temperature curve, pulmonary status, oxygenation and overall clinical picture  Continue to follow    Edwin Jeffers MD

## 2023-05-05 PROBLEM — N17.9 AKI (ACUTE KIDNEY INJURY): Status: ACTIVE | Noted: 2023-01-01

## 2023-05-05 PROBLEM — K72.00 SHOCK LIVER: Status: ACTIVE | Noted: 2023-05-05

## 2023-05-05 NOTE — PLAN OF CARE
Goal Outcome Evaluation:     Patient does not follow commands but does withdraw from pain. Patient does continue to move his head and lower extremities. Patients had low blood sugar problems prior to nurse arriving on shift. Please see other notes about Levemir being held. Patients isugar continue to stay low in the 60's and 1 amp of D50 was administered. After that was administered patient BS has remained in the 80's this shift no sliding scale received neither. Patient did have a high tube feed residual of 750ml's.Tube feed was stopped at 0000. Around 0400 tube feed residual was checked again and got a residual of 500ml's, therefore tube feed remains off. HR has been NS until around 0300 now patient remains in Afib 's. BP has been stable this shift and remains afebrile. Patient did have one restless eposide this shift, but has remained calm and tolerating the vent the rest of the shift. Precedex is now at 0.6. Vent settings continue at FiO2 at 50%, Rate of 16, and Peep of 5. Patient safety maintained this shift.

## 2023-05-05 NOTE — PROGRESS NOTES
[facility]  INPATIENT PROGRESS NOTE    Patient Name: Elijah Escobar  Date of Admission: 3/31/2023  Today's Date: 05/05/23  Length of Stay: 35  Primary Care Physician: Brianna Martinez APRN    Subjective   Chief Complaint: Status post vfib arrest.   HPI   Having residuals from his TF.  Needs to have a BM.    Blood sugars have been low at times.    dexmedetomidine, 0.2-1.5 mcg/kg/hr, Last Rate: 0.6 mcg/kg/hr (05/05/23 0332)  propofol, 5-50 mcg/kg/min, Last Rate: 5 mcg/kg/min (05/05/23 0723)    Labs pending again. Creatinine up to 1.94 yesterday.     Remains critically ill.    Review of Systems   Unable to assess secondary to the patient's intubated and sedated state.      Objective    Temp:  [96.9 °F (36.1 °C)-97.9 °F (36.6 °C)] 97.5 °F (36.4 °C)  Heart Rate:  [62-93] 79  Resp:  [16-30] 26  BP: ()/(36-86) 111/69  FiO2 (%):  [50 %] 50 %  Physical Exam  Constitutional:       Comments: Seen and discussed with his nurse, Lori. No family at the bedside.     HENT:      Head: Normocephalic and atraumatic.   Eyes:      Conjunctiva/sclera: Conjunctivae normal.      Pupils: Pupils are equal, round, and reactive to light.   Neck:      Vascular: No JVD.      Comments: Trach.  Cardiovascular:      Rate and Rhythm: Rhythm irregular.      Heart sounds: Normal heart sounds.      Comments: Afib in the 60s.   Pulmonary: Vented by trach.      Breath sounds: Rhonchi present.   Abdominal:      General: Bowel sounds are normal. There is no distension.      Palpations: Abdomen is soft.      Tenderness: There is no abdominal tenderness.      Comments: PEG in place.    Musculoskeletal: Right femoral CVC placed during the code on 5/3.      General: Swelling (trace) present. No tenderness or deformity.    Skin:     General: Skin is warm and dry.      Findings: No rash.   Neurological:      General: No focal deficit present.      Motor: No abnormal muscle tone.      Comments: Moves  extremities to noxious stimuli. Thrashes at times.      Intake/Output Summary (Last 24 hours) at 5/5/2023 0740  Last data filed at 5/5/2023 0405  Gross per 24 hour   Intake 1813.05 ml   Output 1100 ml   Net 713.05 ml     Results Review:  I have reviewed the labs, radiology results, and diagnostic studies.    Laboratory Data:   Results from last 7 days   Lab Units 05/04/23  0816 05/03/23  0800 05/01/23  0004   WBC 10*3/mm3 18.47* 17.06* 12.05*   HEMOGLOBIN g/dL 8.6* 10.1* 9.1*   HEMATOCRIT % 28.3* 33.5* 30.1*   PLATELETS 10*3/mm3 219 253 281     Results from last 7 days   Lab Units 05/04/23  0817 05/04/23  0031 05/03/23  0800 05/02/23  0852 05/01/23  0004   SODIUM mmol/L 140  --  139 136 138   POTASSIUM mmol/L 3.7 5.0 3.4* 3.7 3.8   CHLORIDE mmol/L 96*  --  94* 94* 96*   CO2 mmol/L 32.0*  --  30.0* 30.0* 31.0*   BUN mg/dL 71*  --  50* 56* 57*   CREATININE mg/dL 1.93*  --  1.34* 1.41* 1.56*   CALCIUM mg/dL 7.9*  --  8.1* 7.8* 8.0*   BILIRUBIN mg/dL 0.6  --  0.5  --  0.6   ALK PHOS U/L 214*  --  151*  --  134*   ALT (SGPT) U/L 141*  --  16  --  19   AST (SGOT) U/L 198*  --  19  --  25   GLUCOSE mg/dL 95  --  212* 148* 115*     Culture Data:   Blood Culture   Date Value Ref Range Status   04/29/2023 No growth at 4 days  Preliminary   04/29/2023 No growth at 4 days  Preliminary   04/28/2023 No growth at 5 days  Final   04/28/2023 No growth at 5 days  Final     Respiratory Culture   Date Value Ref Range Status   04/29/2023 Light growth (2+) Staphylococcus aureus, MRSA (A)  Final     Comment:       Methicillin resistant Staphylococcus aureus, Patient may be an isolation risk.   04/29/2023 No Normal Respiratory Asmita (A)  Final     Radiology Data:   Imaging Results (Last 24 Hours)     Procedure Component Value Units Date/Time    XR Chest 1 View [[id number]] Resulted: 05/05/23 0411     Updated: 05/05/23 0412        Results from last 7 days   Lab Units 05/04/23  0437   PH, ARTERIAL pH units 7.532*   PO2 ART mm Hg 348.0*    PCO2, ARTERIAL mm Hg 41.1   HCO3 ART mmol/L 34.5*   FiO2 (%):  [50 %] 50 %  S RR:  [16] 16  PEEP/CPAP (cm H2O):  [5 cm H20] 5 cm H20  MAP (cm H2O):  [6.9-11] 11    I have reviewed the patient's current medications.     Assessment/Plan   Assessment  Active Hospital Problems    Diagnosis    • **Ventricular fibrillation arrest (5/3/23)    • Swelling of scalp    • Intracranial epidural hematoma    • Post-operative infection    • Meningioma s/p craniotomy    • PEA (Pulseless electrical activity) arrest (4/16/23)    • Acute systolic heart failure    • Type 2 myocardial infarction due to arrhythmia    • Paroxysmal atrial fibrillation with rapid ventricular response    • Coronary artery disease    • Type 2 diabetes mellitus with hyperglycemia and peripheral neuropathy, with long-term current use of insulin (HCC)    • Shock liver    • BESSIE (acute kidney injury)    • COPD (chronic obstructive pulmonary disease)    • Obesity (BMI 30-39.9)    • Tobacco abuse, in remission    • ADDIE (obstructive sleep apnea)    • Acute respiratory failure with hypoxia      Treatment Plan  The patient was admitted on 3/31 by Dr. Portillo.  He has been in the hospital for well over a month at this point.  He has a history of craniotomy, craniectomy.  He has had 2 separate episodes of this becoming infected.  He on this hospitalization has undergone repeat craniotomy, has been on prolonged antibiotic therapy.  He suffered a PEA arrest on 4/16.  He underwent tracheostomy and PEG tube placement on 4/25. He developed ventricular fibrillation leading to a cardiac arrest on the morning of 5/3.      Successfully resuscitated after 3 episodes of defibrillation and IV amiodarone. He was given bicarbonate and magnesium during resuscitative efforts as well.     Would be concerned about possible QT prolongation given the fact that he has recently been on fluoroquinolone antibiotics; stopped.  I was also concerned about worsening systolic function and indeed  his EF is down to 21-25% after being 46-50% on 3/31/23.  He did also have a worsening BNP and also had a significant troponin elevation.  Dr. Portillo wanted to consult cardiac electrophysiology to evaluate him. Dr. Trinidad saw and adjusted medications. Diltiazem and digoxin stopped.  Metoprolol tartrate transitioned to Toprol-XL, but we cannot crush that for administration.  He also stated that he could consider LHC, device placement in the future if he survives and has some recovery of function. He has also previously been seen by general cardiology (Dr. Samaniego).  He has been receiving digoxin, diltiazem, metoprolol.     He has not been on anticoagulation recently.  He had previously been on therapeutic Lovenox. He has been off of this since 4/12.  He has been on an aspirin.  If he remains relatively stable, would send him for a CTA of the chest. This was ordered on 5/3 and has still not been obtained. Will discontinue for now with worsening renal function.      He has lactic acidosis postarrest.  Expected finding. No signs of hemodynamic instability at present.     His renal function had been stable recently, but creatinine up to 1.94.  His serum creatinine has typically been on the order of 1.2 throughout the course of his hospitalization. Will follow up new labs this morning.     He has been followed by pulmonary.  They have been managing ventilatory support.  He is vented via tracheostomy.      He is diabetic.  Most recent blood sugars 81, 82, 78. His most recent hemoglobin A1c was 11.2 at the start of April.     Palliative care was consulted by the primary service. His code status has been changed to no CPR.      Medical Decision Making  Number and Complexity of problems: 1 acute, highly complex problem in the form of his ventricular fibrillation arrest that threatens bodily function and life.  Differential Diagnosis: He had been on levofloxacin recently and may have had difficulty with QT prolongation. He also  has a history of atrial dysrhythmia. Confirmed worsening systolic dysfunction.      Conditions and Status        Condition is worsening.     [facility] Data  External documents reviewed: Reviewed prior Epic records.  Cardiac tracing (EKG, telemetry) interpretation: Atrial fibrillation with rates in the 80s.  Radiology interpretation: As above.  Labs reviewed: As above.  Any tests that were considered but not ordered: None at present.      Decision rules/scores evaluated (example [id number], [place], etc): None considered at present.     Discussed with: His nurse, Lori.      Care Planning  Shared decision making: His family has consented to ongoing treatment.   Code status and discussions: His family has transitioned his code status to no CPR.      Disposition  Social Determinants of Health that impact treatment or disposition: Prolonged hospitalization.  I expect the patient to be discharged by the primary service.      I confirmed that the patient's Advance Care Plan is present, code status is documented, or surrogate decision maker is listed in the patient's medical record.     Electronically signed by Joey Moore DO, 05/05/23, 07:40 CDT.

## 2023-05-05 NOTE — PROGRESS NOTES
[facility] [place] [facility] [place]GRESS NOTE - [facility]    Patient: lEijah Escobar    1955    MR# [id number]    Acct# [id number]  05/05/23   08:33 CDT  Referring Provider: Flaco Portillo, *    Chief Complaint: Mechanically ventilated    Interval history: The patient [facility] Precedex and propofol infusing. He opens eyes but does not follow commands. O2 sat 99% on 5 PEEP, 0.40 FiO2 in AC/VC. ABGs reviewed. Decreased RR to 14 and FiO2 0.40. Pulmicort neb DC'd per Dr. Root. Vt [id number] leak. Per EMR patient with TF residuals, decreased blood sugars, and afib overnight. No other aggravating or alleviating factors.     Meds:  aspirin, 81 mg, Oral, Daily  Chlorhexidine Gluconate Cloth, 1 application, Topical, Q24H  furosemide, 40 mg, Intravenous, Daily  heparin (porcine), 5,000 Units, Subcutaneous, Q12H  insulin lispro, 0-14 Units, Subcutaneous, Q6H  ipratropium, 0.5 mg, Nebulization, 4x Daily - RT  levETIRAcetam, 500 mg, Oral, BID  metoprolol tartrate, 25 mg, Oral, Q12H  multivitamin with minerals, 1 tablet, Oral, Daily  pantoprazole, 40 mg, Intravenous, Q AM  polyethylene glycol, 17 g, Oral, Daily  sennosides-docusate, 1 tablet, Oral, Daily  sodium chloride, 10 mL, Intravenous, Q12H  sodium chloride, 10 mL, Intravenous, Q12H  sodium chloride, 10 mL, Intravenous, Q12H  sodium chloride, 10 mL, Intravenous, Q12H      dexmedetomidine, 0.2-1.5 mcg/kg/hr, Last Rate: 0.6 mcg/kg/hr (05/05/23 0806)  dextrose, 50 mL/hr, Last Rate: 50 mL/hr (05/05/23 0747)  propofol, 5-50 mcg/kg/min, Last Rate: 5 mcg/kg/min (05/05/23 0723)        Ventilator Settings:        Resp Rate (Set): 16  Pressure Support (cm H2O): 10 cm H20  FiO2 (%): 50 %  PEEP/CPAP (cm H2O): 5 cm H20  Minute Ventilation (L/min) (Obs): 11.1 L/min  Resp Rate (Observed) Vent: 25  I:E Ratio (Set): 1:4.45  I:E Ratio (Obs): 1:1.2  PIP Observed (cm H2O): 16 cm H2O  RSBI: 0  Physical Exam:  Temp:  [96.9  °F (36.1 °C)-97.9 °F (36.6 °C)] 97.5 °F (36.4 °C)  Heart Rate:  [62-93] 79  Resp:  [16-30] 26  BP: ()/(57-86) 111/69  FiO2 (%):  [50 %] 50 %    Intake/Output Summary (Last 24 hours) at 5/5/2023 0833  Last data filed at 5/5/2023 0405  Gross per 24 hour   Intake 1813.05 ml   Output 1100 ml   Net 713.05 ml     SpO2 Percentage    05/05/23 0613 05/05/23 0621 05/05/23 0700   SpO2: 99% 100% 99%   Body mass index is 39.2 kg/m².   Physical Exam   Constitutional:       Appearance: He is obese. He is ill-appearing. He appears uncomfortable. Intubated, restrained. No ventilator dyssynchrony.  HENT:      Head: Normocephalic. Right scalp wound, well-healed, no redness warmth or drainage     Nose: Nose normal.      Mouth/Throat: trach  Eyes:      General:         Right eye: No discharge.         Left eye: No discharge.   Cardiovascular:      Rate: normal rate.   Pulmonary:      Effort: increased work of breathing      Breath sounds: No wheezing.  Diminished in bases  Abdominal:      General: PEG tube, Abdomen is distended.   Musculoskeletal:      General: Bilateral wrist restraints, SCDs      Right lower leg: Edema      Left lower leg: Edema   Skin:     General: Skin is warm and dry.      Coloration: Skin is not jaundiced or pale.   Neurological:      General: Intubated, opens eyes, does not follow commands    Electronically signed by ADITYA Foss, 5/5/2023, 08:33 CDT      Physician substantive portion: medical decision making    Result Review    Laboratory Data:  Results from last 7 days   Lab Units 05/05/23  0811 05/04/23  0816 05/03/23  0800   WBC 10*3/mm3 16.57* 18.47* 17.06*   HEMOGLOBIN g/dL 8.7* 8.6* 10.1*   PLATELETS 10*3/mm3 204 219 253     Results from last 7 days   Lab Units 05/05/23  0811 05/04/23  0817 05/04/23  0031 05/03/23  0800 05/03/23  0755 05/02/23  0852 05/01/23  0004   SODIUM mmol/L 141 140  --  139  --    < > 138   POTASSIUM mmol/L 3.6 3.7 5.0 3.4*  --    < > 3.8   CO2 mmol/L 30.0* 32.0*   --  30.0*  --    < > 31.0*   BUN mg/dL 69* 71*  --  50*  --    < > 57*   CREATININE mg/dL 1.81* 1.93*  --  1.34*  --    < > 1.56*   LACTATE mmol/L  --   --   --   --  6.9*  --   --    CRP mg/dL  --   --   --   --   --   --  12.72*    < > = values in this interval not displayed.     Results from last 7 days   Lab Units 05/04/23  0437 05/03/23  0851 05/03/23  0356   PH, ARTERIAL pH units 7.532* 7.181* 7.559*   PCO2, ARTERIAL mm Hg 41.1 59.8* 41.0   PO2 ART mm Hg 348.0* 59.0* 55.2*   FIO2 % 100 100 30     Microbiology Results (last 10 days)     Procedure Component Value - Date/Time    Respiratory Culture - Aspirate, ET Suction [[id number]]  (Abnormal)  (Susceptibility) Collected: 04/29/23 1954    Lab Status: Final result Specimen: Aspirate from ET Suction Updated: 05/02/23 0902     Respiratory Culture Light growth (2+) Staphylococcus aureus, MRSA     Comment:   Methicillin resistant Staphylococcus aureus, Patient may be an isolation risk.         No Normal Respiratory Asmita     Gram Stain Many (4+) WBCs per low power field      Few (2+) Epithelial cells per low power field      Rare (1+) Gram positive cocci    Susceptibility      Staphylococcus aureus, MRSA      STALIN      Clindamycin Resistant     Inducible Clindamycin Resistance Negative      Linezolid Susceptible      Oxacillin Resistant     Tetracycline Resistant     Trimethoprim + Sulfamethoxazole Resistant     Vancomycin Susceptible                           Blood Culture With SIMRAN - Blood, Arm, Left [[id number]]  (Normal) Collected: 04/29/23 1757    Lab Status: Final result Specimen: Blood from Arm, Left Updated: 05/04/23 1831     Blood Culture No growth at 5 days    Blood Culture With SIMRAN - Blood, Hand, Right [[id number]]  (Normal) Collected: 04/29/23 1756    Lab Status: Final result Specimen: Blood from Hand, Right Updated: 05/04/23 1831     Blood Culture No growth at 5 days    MRSA Screen, PCR (Inpatient) - Swab, Nares [[id number]]  (Abnormal) Collected: 04/29/23  1748    Lab Status: Final result Specimen: Swab from Nares Updated: 04/29/23 1849     MRSA PCR MRSA Detected    Narrative:      The negative predictive value of this diagnostic test is high and should only be used to consider de-escalating anti-MRSA therapy. A positive result may indicate colonization with MRSA and must be correlated clinically.    Blood Culture - Blood, Arm, Right [[id number]]  (Normal) Collected: 04/28/23 0130    Lab Status: Final result Specimen: Blood from Arm, Right Updated: 05/03/23 0200     Blood Culture No growth at 5 days    Blood Culture - Blood, Arm, Right [[id number]]  (Normal) Collected: 04/28/23 0100    Lab Status: Final result Specimen: Blood from Arm, Right Updated: 05/03/23 0200     Blood Culture No growth at 5 days         Recent films:  XR Chest 1 View    Result Date: 5/5/2023  EXAMINATION: XR CHEST 1 VW- 5/5/2023 8:58 AM CDT  HISTORY: Intubated and sedated; S06.4X0A-Epidural hemorrhage without loss of consciousness, initial encounter; Z74.09-Other reduced mobility; Z98.890-Other specified postprocedural states; T81.42XA-Infection following a procedure, deep incisional surgical site, initial encounter; 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  COMPARISON: Chest x-ray 05/04/2023 0313 hours.  The tracheostomy tube remains in satisfactory position, there is a new left-sided PICC, the tip projects over the proximal SVC in satisfactory position. There is moderate volume loss in the lung bases, with persistent central vascular congestion and mild pulmonary edema, slightly improved on the left. The heart remains enlarged. No acute osseous abnormality is identified. Smooth loose bodies project over the right glenohumeral joint The upper abdomen is unremarkable.      Impression: Interval insertion of a left-sided PICC, in satisfactory position. The tracheostomy tube remains in good  position. Persistent but mildly improved asymmetric pulmonary edema and evidence of CHF. This report was finalized on 05/05/2023 09:00 by Dr. Elijah Grover MD.    XR Chest 1 View    Result Date: 5/4/2023  EXAMINATION: XR CHEST 1 VW- 5/4/2023 7:25 AM CDT  HISTORY: Intubated and sedated; S06.4X0A-Epidural hemorrhage without loss of consciousness, initial encounter; Z74.09-Other reduced mobility; Z98.890-Other specified postprocedural states; T81.42XA-Infection following a procedure, deep incisional surgical site, initial encounter; 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  COMPARISON: Chest x-ray 05/03/2023 0327 hours.  The tracheostomy tube appears in satisfactory position as before. The lungs remain somewhat hypoaerated, there is diffuse hazy pulmonary infiltrate/edema and consolidation of the left base, unchanged. The heart is enlarged. No pneumothorax is identified. A small left pleural effusion is not excluded. No acute osseous abnormality.      Impression: Stable 1 day appearance of the chest. This report was finalized on 05/04/2023 07:27 by Dr. Elijah Grover MD.     Personal review of imaging: CXR shows pulmonary edema    Pulmonary Assessment:  1. Acute resp failure with hypoxia, requiring mechanical vent, threat to life and bodily function  2. S/p cardiac arrest, with rosc  3. Encephalopathy  4. Metabolic alkalosis  5. Hx nicotine dependency    Recommend/plan:   · D/c nicotine patch  · D/c budesonide neb  · Continue vent, no changes, not ready for spontaneous breathing trial due to labile oxygenation  · Titrate fio2; follow up abg discussed with nursing and RT  · Monitor lft    This visit was performed by both a physician and an Advanced Practice RN.  I performed all aspects of the medical decision making as documented.  Electronically signed by Ajay Root MD, 5/5/2023, 11:06 CDT

## 2023-05-05 NOTE — PROGRESS NOTES
Neurosurgery Daily Progress Note    HPI:  Elijah Escobar is a 67 y.o. male with a significant medical history of hypertension, diabetes, hyperlipidemia, seizures, craniotomy for tumor (6/16/2022), craniotomy for washout (7/1/2022), craniectomy (10/4/2022), coronary artery disease, diabetes, erectile dysfunction, GERD, hypertension, hyperlipidemia, tobacco abuse, and obesity.  He presents today with a complaint of a 4-5 days onset of progressively worsening right frontal, temporal, and periorbital edema and associated symptoms to include, but not limited to generalized fatigue, chills, dyspnea, intermittent nausea without vomiting, and states he's felt feverish.  Physical exam findings of neurologically intact with right frontal, temporal, and periorbital swelling.  WBC elevated at 15.  3 to an CRP elevated at 14.75.  Imaging pending.    Assessment:   Past Medical History:   Diagnosis Date   • Brain tumor    • Coronary artery disease    • COVID-19 vaccine series completed     MODERNA X 3; LAST DOSE 3/2022   • Diabetes    • Erectile dysfunction    • GERD (gastroesophageal reflux disease)    • Hypercholesteremia    • Hypertension    • Seizure      Active Hospital Problems    Diagnosis    • **Ventricular fibrillation arrest (5/3/23)    • Shock liver    • BESSIE (acute kidney injury)    • PEA (Pulseless electrical activity) arrest (4/16/23)    • COPD (chronic obstructive pulmonary disease)    • Obesity (BMI 30-39.9)    • Tobacco abuse, in remission    • Acute systolic heart failure    • ADDIE (obstructive sleep apnea)    • Type 2 myocardial infarction due to arrhythmia    • Acute respiratory failure with hypoxia    • Swelling of scalp    • Intracranial epidural hematoma    • Post-operative infection    • Paroxysmal atrial fibrillation with rapid ventricular response    • Type 2 diabetes mellitus with hyperglycemia and peripheral neuropathy, with long-term current use of insulin (HCC)    • Coronary artery disease    •  Meningioma s/p craniotomy      Plan:   Neuro: Intermittently opens eyes to voice.  Does not follow commands.  + Cough and gag.  Intermittent spontaneous movement of head and feet    Infected cranial flap      POD #28 (4/4/2023)  craniectomy and washout    Postop code CT and MRI stable. No acute abnormalities.    Heavy 4+ Serratia marcescens     POD #21 (4/13/2023) evacuation of scalp hematoma    Continue neuro exams per policy.  Call for decline  EEG shows no epileptiform activity or ongoing seizures    10 Days Post-Op (4/25/2023) tracheostomy and PEG tube placement.      CV: Code for PEA, 4/16/2023 and 5/3/2023.     AFib/flutter, rate controlled.   ASA 81mg and resume heparin prophylaxis today.    No evidence of DVT or PE per imaging   Appreciate cardiology  Pulm: Ventilation per trach   Increased pulmonary edema per CXR.    : Condom cath  FEN: Enteral feeding per PEG tube as tolerated  Endocrine: Continue SSI  GI: SHERIF.  +BM  ID:  Infected cranial flap  CSF: no growth to date   PNA.  Resolved   UTI.  Resolved   MRSA +   Antibiotics per ID  Heme:  DVT prophylaxis with SCDs and Heparin  Pain: No complaints at present  Dispo: PT/OT   Palliative care consulted.  Family has elected to de-escalate to no CPR, no defibrillation.     Chief complaint:   4-5 days onset of progressively worsening right frontal, temporal, and periorbital edema and associated symptoms to include, but not limited to generalized fatigue, chills, dyspnea, intermittent nausea without vomiting, and states he's felt feverish.    HPI  Subjective:  Symptoms:  Stable.      Temp:  [96.9 °F (36.1 °C)-97.9 °F (36.6 °C)] 97.5 °F (36.4 °C)  Heart Rate:  [62-93] 79  Resp:  [16-30] 26  BP: ()/(57-86) 111/69  FiO2 (%):  [50 %] 50 %    Output by Drain (mL) 05/04/23 0701 - 05/04/23 1900 05/04/23 1901 - 05/05/23 0700 05/05/23 0701 - 05/05/23 0843 Range Total   Requested LDAs do not have output data documented.     Objective:  Vital signs: (most recent):  "Blood pressure 111/69, pulse 79, temperature 97.5 °F (36.4 °C), temperature source Oral, resp. rate 26, height 177.8 cm (70\"), weight 124 kg (273 lb 3.2 oz), SpO2 99 %.      Neurologic Exam     Mental Status      Intermittently opens eyes to voice.  Does not follow commands.  + Cough and gag.  Intermittent spontaneous movement of head and feet      Cranial Nerves     CN III, IV, VI   Pupils are equal, round, and reactive to light.    CN VII   Right facial weakness: none  Left facial weakness: none    CN IX, X   Right gag reflex: normal  Left gag reflex: normal    Motor Exam   Minimal motor response to pain to painful stimuli.       Drains: * No LDAs found *    Imaging Results (Last 24 Hours)     Procedure Component Value Units Date/Time    XR Chest 1 View [[id number]] Resulted: 05/05/23 0411     Updated: 05/05/23 0412        Lab Results (last 24 hours)     Procedure Component Value Units Date/Time    Comprehensive Metabolic Panel [[id number]]  (Abnormal) Collected: 05/05/23 0811    Specimen: Blood Updated: 05/05/23 0838     Glucose 69 mg/dL      BUN 69 mg/dL      Creatinine 1.81 mg/dL      Sodium 141 mmol/L      Potassium 3.6 mmol/L      Comment: Slight hemolysis detected by analyzer. Results may be affected.        Chloride 98 mmol/L      CO2 30.0 mmol/L      Calcium 7.8 mg/dL      Total Protein 5.9 g/dL      Albumin 2.6 g/dL      ALT (SGPT) 105 U/L      AST (SGOT) 83 U/L      Comment: Slight hemolysis detected by analyzer. Results may be affected.        Alkaline Phosphatase 208 U/L      Total Bilirubin 0.5 mg/dL      Globulin 3.3 gm/dL      A/G Ratio 0.8 g/dL      BUN/Creatinine Ratio 38.1     Anion Gap 13.0 mmol/L      eGFR 40.5 mL/min/1.73     Narrative:      GFR Normal >60  Chronic Kidney Disease <60  Kidney Failure <15      CBC (No Diff) [[id number]]  (Abnormal) Collected: 05/05/23 0811    Specimen: Blood Updated: 05/05/23 0818     WBC 16.57 10*3/mm3      RBC 3.01 10*6/mm3      Hemoglobin 8.7 g/dL      " Hematocrit 29.0 %      MCV 96.3 fL      MCH 28.9 pg      MCHC 30.0 g/dL      RDW 15.2 %      RDW-SD 51.8 fl      MPV 11.6 fL      Platelets 204 10*3/mm3     POC Glucose Once [[id number]]  (Normal) Collected: 05/05/23 0616    Specimen: Blood Updated: 05/05/23 0627     Glucose 78 mg/dL      Comment: : [id number] Singh CharidaMeter ID: [id number]       POC Glucose Once [[id number]]  (Normal) Collected: 05/05/23 0352    Specimen: Blood Updated: 05/05/23 0403     Glucose 82 mg/dL      Comment: : [id number] Singh CharidaMeter ID: [id number]       POC Glucose Once [[id number]]  (Normal) Collected: 05/05/23 0002    Specimen: Blood Updated: 05/05/23 0013     Glucose 81 mg/dL      Comment: : [id number] Singh CharidaMeter ID: [id number]       POC Glucose Once [[id number]]  (Normal) Collected: 05/04/23 2324    Specimen: Blood Updated: 05/04/23 2335     Glucose 85 mg/dL      Comment: : [id number] Singh CharidaMeter ID: [id number]       POC Glucose Once [[id number]]  (Normal) Collected: 05/04/23 2109    Specimen: Blood Updated: 05/04/23 2120     Glucose 112 mg/dL      Comment: : [id number] Singh CharidaMeter ID: [id number]       POC Glucose Once [[id number]]  (Abnormal) Collected: 05/04/23 2040    Specimen: Blood Updated: 05/04/23 2056     Glucose 66 mg/dL      Comment: : [id number] Singh CharidaMeter ID: [id number]       POC Glucose Once [[id number]]  (Normal) Collected: 05/04/23 1914    Specimen: Blood Updated: 05/04/23 1926     Glucose 94 mg/dL      Comment: : [id number] Singh CharidaMeter ID: [id number]       Blood Culture With SIMRAN - Blood, Arm, Left [[id number]]  (Normal) Collected: 04/29/23 1757    Specimen: Blood from Arm, Left Updated: 05/04/23 1831     Blood Culture No growth at 5 days    Blood Culture With SIMRAN - Blood, Hand, Right [[id number]]  (Normal) Collected: 04/29/23 1756    Specimen: Blood from Hand, Right Updated: 05/04/23 1831     Blood Culture No growth at 5 days    POC  Glucose Once [[id number]]  (Abnormal) Collected: 05/04/23 1741    Specimen: Blood Updated: 05/04/23 1752     Glucose 60 mg/dL      Comment: : sunny SandovalMeter ID: [id number]       POC Glucose Once [[id number]]  (Abnormal) Collected: 05/04/23 1728    Specimen: Blood Updated: 05/04/23 1752     Glucose 60 mg/dL      Comment: : sunny SandovalMeter ID: [id number]       POC Glucose Once [[id number]]  (Abnormal) Collected: 05/04/23 1121    Specimen: Blood Updated: 05/04/23 1132     Glucose 64 mg/dL      Comment: : sunny SandovalMeter ID: [id number]       Comprehensive Metabolic Panel [[id number]]  (Abnormal) Collected: 05/04/23 0817    Specimen: Blood Updated: 05/04/23 0909     Glucose 95 mg/dL      BUN 71 mg/dL      Creatinine 1.93 mg/dL      Sodium 140 mmol/L      Potassium 3.7 mmol/L      Chloride 96 mmol/L      CO2 32.0 mmol/L      Calcium 7.9 mg/dL      Total Protein 6.3 g/dL      Albumin 2.7 g/dL      ALT (SGPT) 141 U/L      AST (SGOT) 198 U/L      Alkaline Phosphatase 214 U/L      Total Bilirubin 0.6 mg/dL      Globulin 3.6 gm/dL      A/G Ratio 0.8 g/dL      BUN/Creatinine Ratio 36.8     Anion Gap 12.0 mmol/L      eGFR 37.5 mL/min/1.73     Narrative:      Repeated Potassium to confirm result.  GFR Normal >60  Chronic Kidney Disease <60  Kidney Failure <15          Rahul Arnold, APRN

## 2023-05-05 NOTE — CASE MANAGEMENT/SOCIAL WORK
Continued Stay Note   [place]     Patient Name: Elijah Escobar  MRN: [id number]  Today's Date: 5/5/2023    Admit Date: 3/31/2023    Plan: Pending   Discharge Plan     Row Name 05/05/23 1025       Plan    Plan Pending    Plan Comments Palliative care following.  [facility] referral on hold.  SW following.               Discharge Codes    No documentation.                     TIANA Syed

## 2023-05-05 NOTE — PROGRESS NOTES
[facility] Palliative Care Services  Progress Note  Patient Name: Elijah Escobar  Date of Admission: 3/31/2023  Today's Date: 05/05/23     Code Status and Medical Interventions:   Ordered at: 05/03/23 1316     Medical Intervention Limits:    NO cardioversion     Code Status (Patient has no pulse and is not breathing):    No CPR (Do Not Attempt to Resuscitate)     Medical Interventions (Patient has pulse or is breathing):    Limited Support     Release to patient:    Routine Release     Subjective   Chief complaint/Reason for Referral/Visit: Follow up on Goals of Care/Advance Care Planning     Medical record reviewed. Events noted.  Labs collected this morning reveal slight improvement in renal function and LFTs however remain impaired.  According to chart review he has had increased residuals of tube feeds overnight.  Chest x-ray pending.  He remains intubated and sedated. No family at bedside.      Advance Care Planning    Advanced Directives: No advance directive on file.      Advance Care Planning Discussion:  Mr. Escobar remains intubated and seated and unable to demonstrate ability to make complex medical decisions at this time.  Call placed to patient's son, Cathy, however unable to reach and voicemail has not been set up.  Call also placed to patient's sister, Radha, at number provided in chart however unable to reach and voicemail is full.  Unable to speak with patient's spouse as family reports she is deaf and unable to communicate via telephone.  Will attempt to call again later this afternoon.     Review of Systems   Unable to perform ROS: intubated     Pain Assessment  Nonverbal Indicators of Pain: muscle tension, grimace  CPOT and PAINAD Scales: CPOT (Critical-Care Pain Observation Tool)  CPOT Facial Expression: 0-->relaxed, neutral  CPOT Body Movements: 0-->absence of movements  CPOT Muscle Tension: 0-->relaxed  Ventilator Compliance/Vocalization: 0-->tolerating ventilator or  movement  CPOT Score: 0  PAINAD Breathin-->normal  PAINAD Negative Vocalization: 0-->none  PAINAD Facial Expression: 0-->smiling or inexpressive  PAINAD Body Language: 0-->relaxed  PAINAD Consolability: 0-->no need to console  PAINAD Score: 0  Pain Location: head, other (see comments)  Pain Description: aching  Objective   Diagnostics: Reviewed      Intake/Output Summary (Last 24 hours) at 2023 0854  Last data filed at 2023 0405  Gross per 24 hour   Intake 1813.05 ml   Output 1100 ml   Net 713.05 ml     Current Facility-Administered Medications   Medication Dose Route Frequency Provider Last Rate Last Admin   • acetaminophen (TYLENOL) tablet 650 mg  650 mg Oral Q4H PRN Wagner Villarreal MD   650 mg at 04/15/23 1152    Or   • acetaminophen (TYLENOL) 160 MG/5ML solution 650 mg  650 mg Oral Q4H PRN Wagner Villarreal MD   650 mg at 23 2259    Or   • acetaminophen (TYLENOL) suppository 650 mg  650 mg Rectal Q4H PRN Wagner Villarreal MD       • aspirin chewable tablet 81 mg  81 mg Oral Daily Wagner Villarreal MD   81 mg at 23 0904   • butalbital-acetaminophen-caffeine (FIORICET, ESGIC) -40 MG per tablet 1 tablet  1 tablet Oral Q6H PRN Wagner Villarreal MD   1 tablet at 23 1132   • calcium carbonate (TUMS) chewable tablet 500 mg (200 mg elemental)  2 tablet Oral TID PRN Wagner Villarreal MD   2 tablet at 04/15/23 1131   • Chlorhexidine Gluconate Cloth 2 % pads 1 application  1 application Topical Q24H Wagner Villarreal MD   1 application at 23 0354   • dexmedetomidine (PRECEDEX) 400 mcg in 100 mL NS infusion  0.2-1.5 mcg/kg/hr Intravenous Titrated Tracy Gupta MD 19.5 mL/hr at 23 0806 0.6 mcg/kg/hr at 23 0806   • dextrose (D50W) (25 g/50 mL) IV injection 25 g  25 g Intravenous Q15 Min PRN Wagner Villarreal MD   25 g at 23   • dextrose (D5W) 5 % infusion  50 mL/hr Intravenous Continuous Joey Moore DO 50 mL/hr at 23 0791  50 mL/hr at 05/05/23 0747   • dextrose (GLUTOSE) oral gel 15 g  15 g Oral Q15 Min PRN Wagner Villarreal MD       • furosemide (LASIX) injection 40 mg  40 mg Intravenous Daily Tracy Gupta MD   40 mg at 05/04/23 0904   • glucagon (human recombinant) (GLUCAGEN DIAGNOSTIC) injection 1 mg  1 mg Intramuscular Q15 Min PRN Wagner Villarreal MD       • heparin (porcine) 5000 UNIT/ML injection 5,000 Units  5,000 Units Subcutaneous Q12H Flaco Portillo MD   5,000 Units at 05/04/23 2040   • hydrOXYzine (ATARAX) tablet 25 mg  25 mg Oral TID PRN Wagner Villarreal MD   25 mg at 04/16/23 1139   • Insulin Lispro (humaLOG) injection 0-14 Units  0-14 Units Subcutaneous Q6H Wagner Villarreal MD   3 Units at 05/04/23 0016   • ipratropium (ATROVENT) nebulizer solution 0.5 mg  0.5 mg Nebulization 4x Daily PRN Wagner Villarreal MD       • ipratropium (ATROVENT) nebulizer solution 0.5 mg  0.5 mg Nebulization 4x Daily - RT Wagner Villarreal MD   0.5 mg at 05/05/23 0613   • ipratropium-albuterol (DUO-NEB) nebulizer solution 3 mL  3 mL Nebulization Q4H PRN Wagner Villarreal MD   3 mL at 04/27/23 1021   • levETIRAcetam (KEPPRA) tablet 500 mg  500 mg Oral BID Wagner Villarreal MD   500 mg at 05/04/23 2034   • metoprolol tartrate (LOPRESSOR) tablet 25 mg  25 mg Oral Q12H Joey Moore DO   25 mg at 05/04/23 2034   • morphine injection 5 mg  5 mg Intravenous Q4H PRN Ramin Singh DO   5 mg at 05/04/23 1102   • multivitamin with minerals 1 tablet  1 tablet Oral Daily Wagner Villarreal MD   1 tablet at 05/04/23 0911   • ondansetron (ZOFRAN) tablet 4 mg  4 mg Oral Q6H PRN Wagner Villarreal MD        Or   • ondansetron (ZOFRAN) injection 4 mg  4 mg Intravenous Q6H PRN Wagner Villarreal MD   4 mg at 04/25/23 0121   • pantoprazole (PROTONIX) injection 40 mg  40 mg Intravenous Q AM Wagner Villarreal MD   40 mg at 05/05/23 0637   • polyethylene glycol (MIRALAX) packet 17 g  17 g Oral Daily Phil  Wagner Lewis MD   17 g at 05/04/23 0904   • potassium chloride (MICRO-K) CR capsule 40 mEq  40 mEq Oral PRN Joey Moore, DO        Or   • potassium chloride (KLOR-CON) packet 40 mEq  40 mEq Oral PRN Joey Moore, DO        Or   • potassium chloride 20 mEq in 50 mL IVPB  20 mEq Intravenous Q1H PRN Joey Moore, DO 50 mL/hr at 05/03/23 1834 20 mEq at 05/03/23 1834   • propofol (DIPRIVAN) infusion 10 mg/mL 100 mL  5-50 mcg/kg/min Intravenous Titrated Ajay Root MD 3.72 mL/hr at 05/05/23 0723 5 mcg/kg/min at 05/05/23 0723   • sennosides-docusate (PERICOLACE) 8.6-50 MG per tablet 1 tablet  1 tablet Oral Daily Wagner Villarreal MD   1 tablet at 05/04/23 0904   • sodium chloride 0.9 % flush 10 mL  10 mL Intravenous Q12H Wagner Villarreal MD   10 mL at 05/04/23 2035   • sodium chloride 0.9 % flush 10 mL  10 mL Intravenous PRN Wagner Villarreal MD   10 mL at 04/27/23 0542   • sodium chloride 0.9 % flush 10 mL  10 mL Intravenous Q12H Joey Moore, DO   10 mL at 05/04/23 2035   • sodium chloride 0.9 % flush 10 mL  10 mL Intravenous Q12H Joey Moore, DO   10 mL at 05/04/23 2035   • sodium chloride 0.9 % flush 10 mL  10 mL Intravenous Q12H Joey Moore, DO   10 mL at 05/04/23 2035   • sodium chloride 0.9 % flush 10 mL  10 mL Intravenous PRN Joey Moore, DO       • sodium chloride 0.9 % flush 20 mL  20 mL Intravenous PRN Joey Moore, DO       • sodium chloride 0.9 % infusion 40 mL  40 mL Intravenous PRN Wagner Villarreal MD       • sodium chloride 0.9 % infusion 40 mL  40 mL Intravenous PRN Joey Moore, DO         dexmedetomidine, 0.2-1.5 mcg/kg/hr, Last Rate: 0.6 mcg/kg/hr (05/05/23 0806)  dextrose, 50 mL/hr, Last Rate: 50 mL/hr (05/05/23 0747)  propofol, 5-50 mcg/kg/min, Last Rate: 5 mcg/kg/min (05/05/23 0723)      •  acetaminophen **OR** acetaminophen **OR** acetaminophen  •  butalbital-acetaminophen-caffeine  •  calcium carbonate  •  dextrose  •  dextrose  •  glucagon  "(human recombinant)  •  hydrOXYzine  •  ipratropium  •  ipratropium-albuterol  •  Morphine  •  ondansetron **OR** ondansetron  •  potassium chloride **OR** potassium chloride **OR** potassium chloride  •  sodium chloride  •  sodium chloride  •  sodium chloride  •  sodium chloride  •  sodium chloride  Current medications patient is presently taking including all prescriptions, over-the-counter, herbals and vitamin/mineral/dietary (nutritional) supplements with reviewed including route, type, dose and frequency and are current per MAR at time of dictation.    Assessment:  Vital Signs: /69   Pulse 79   Temp 97.5 °F (36.4 °C) (Oral)   Resp 26   Ht 177.8 cm (70\")   Wt 124 kg (273 lb 3.2 oz)   SpO2 99%   BMI 39.20 kg/m²     Physical Exam  Vitals and nursing note reviewed.   Constitutional:       Appearance: He is obese. He is ill-appearing.      Interventions: He is sedated and intubated.   Eyes:      General: Lids are normal.   Neck:      Trachea: Tracheostomy present.   Cardiovascular:      Rate and Rhythm: Normal rate.   Pulmonary:      Effort: He is intubated.      Comments: FiO2 50%, PEEP 5.  Abdominal:      Comments: PEG tube present.   Skin:     General: Skin is warm and dry.   Neurological:      Comments: Remains intubated and sedated.     Functional status: Palliative Performance Scale Score: Performance 10% based on the following measures: Ambulation: Totally bed bound, Activity and Evidence of Disease: Unable to do any work, extensive evidence of disease, Self-Care: Total care required,  Intake: Mouth care only, LOC: Drowsy or comatose.  Nutritional status: Albumin 2.7. Body mass index is 39.2 kg/m².   Patient status: Disease state: Deteriorating despite treatments.    Impression/Problem List:  1. Acute systolic heart failure  2. Ventricular fibrillation arrest  3. Pulseless electrical activity arrest  4. Intracranial epidural hematoma s/p evacuation  5. Meningioma s/p craniotomy   6. Acute " "respiratory failure with hypoxia s/p tracheostomy  7. Type II myocardial infarction due to arrhythmia  8. Obstructive sleep apnea  9. Coronary artery disease  10. Type 2 diabetes mellitus with hyperglycemia and peripheral neuropathy with long-term current use of insulin  11. Tobacco abuse, in remission  12. COPD  13. Obesity  14. Paroxysmal atrial fibrillation with rapid ventricular response  15. Presence of PEG tube    Plan / Recommendations     1. Palliative Care Encounter   • Goals of care include CODE STATUS NO CPR with limited support interventions.     • Prognosis is poor long-term secondary to meningioma s/p craniotomy, V-fib arrest, PEA arrest, intracranial epidural hematoma s/p evacuation, acute systolic heart failure, acute respiratory failure with hypoxia requiring tracheostomy, type II myocardial infarction due to arrhythmia and other comorbidities listed above.     • Mr. Escobar remains intubated and sedated and unable to demonstrate ability to make complex medical decisions at this time.      • Family reports patient's spouse is deaf and unable to communicate via telephone.     • Call placed to patient's son, Cathy, and patient's sister, Radha, however unable to reach either at number listed in chart.  Unable to leave voicemail for either.     o Will attempt to call back this afternoon to discuss goals of care further.     ADDENDUM:  · Additional call placed to patient's son, Cathy, around 2:40 PM however unable to reach and unable to leave voicemail.  Call also placed to patient's sister, Radha, and also unable to reach or leave voicemail.      · Was able to reach patient's other sister, Brina, via telephone.  Denies any questions.  Reports she is \"awaiting to see what God's plans are.\"  Asked that she also let Cathy Ruth Ann and Radha know I have been trying to call to discuss with them as well.  Reports she will do so and again denied any questions and/or concerns at this time.     Thank you for " allowing us to participate in patient's plan of care. Palliative Care Team will continue to follow patient.     Time spent:35 minutes spent reviewing medical and medication records, assessing and examining patient, discussing with family, answering questions, providing some guidance about a plan and documentation of care, and coordinating care with other healthcare members, with > 50% time spent face to face.     Electronically signed by, ADITYA Gonzalez, 05/05/23.

## 2023-05-05 NOTE — NURSING NOTE
Patients blood sugar has been low piror to nurse arriving on shift. Patient had an amp of D50 at 1800 due to BS in the 60's. Nurse came onto shift and checked BS and it was in the 90's. Nurse notified  to ask if Levemir needed to be given or held.  order for nurse not to give the Levemir.

## 2023-05-05 NOTE — PROGRESS NOTES
"INFECTIOUS DISEASES PROGRESS NOTE    Patient:  Elijah Escobar  YOB: 1955  MRN: [id number]   Admit date: 3/31/2023   Admitting Physician: Flaco Portillo, *  Primary Care Physician: Brianna Martinez APRN    Chief Complaint: Unobtainable from patient      Interval History: Patient is sedated on the ventilator.  He remains on Precedex          Allergies: No Known Allergies    Current Scheduled Medications:   aspirin, 81 mg, Oral, Daily  Chlorhexidine Gluconate Cloth, 1 application, Topical, Q24H  furosemide, 40 mg, Intravenous, Daily  heparin (porcine), 5,000 Units, Subcutaneous, Q12H  insulin lispro, 0-14 Units, Subcutaneous, Q6H  ipratropium, 0.5 mg, Nebulization, 4x Daily - RT  levETIRAcetam, 500 mg, Oral, BID  metoprolol tartrate, 25 mg, Oral, Q12H  multivitamin with minerals, 1 tablet, Oral, Daily  pantoprazole, 40 mg, Intravenous, Q AM  polyethylene glycol, 17 g, Oral, Daily  sennosides-docusate, 1 tablet, Oral, Daily  sodium chloride, 10 mL, Intravenous, Q12H  sodium chloride, 10 mL, Intravenous, Q12H  sodium chloride, 10 mL, Intravenous, Q12H  sodium chloride, 10 mL, Intravenous, Q12H      Current PRN Medications:  •  acetaminophen **OR** acetaminophen **OR** acetaminophen  •  butalbital-acetaminophen-caffeine  •  calcium carbonate  •  dextrose  •  dextrose  •  glucagon (human recombinant)  •  hydrOXYzine  •  ipratropium  •  ipratropium-albuterol  •  ondansetron **OR** ondansetron  •  potassium chloride **OR** potassium chloride **OR** potassium chloride  •  sodium chloride  •  sodium chloride  •  sodium chloride  •  sodium chloride  •  sodium chloride    Review of Systems   Unable to perform ROS: Intubated       Objective     Vital Signs:  Temp (24hrs), Av.5 °F (36.4 °C), Min:96.9 °F (36.1 °C), Max:97.9 °F (36.6 °C)      /63   Pulse 71   Temp 97.5 °F (36.4 °C) (Oral)   Resp 24   Ht 177.8 cm (70\")   Wt 124 kg (273 lb 3.2 oz)   SpO2 100%   BMI 39.20 kg/m² "         Physical Exam  General: Patient is a 67-year-old gentleman lying in bed in no acute distress in CCU.  Neck: Head turning to the right but easily repositioned and supple.  Trach in place.  Respiratory: Effort even and unlabored  Neuro: Sedated on vent  Psych: Not agitated    Line/IV site: PICC line left upper extremity dressing clean dry and intact.    Results Review:    I reviewed the patient's new clinical results.    Lab Results:  CBC:     CBC w/diff        5/3/2023    08:00 5/4/2023    08:16 5/5/2023    08:11   CBC w/Diff   WBC 17.06   18.47   16.57     RBC 3.39   2.97   3.01     Hemoglobin 10.1   8.6   8.7     Hematocrit 33.5   28.3   29.0     MCV 98.8   95.3   96.3     MCH 29.8   29.0   28.9     MCHC 30.1   30.4   30.0     RDW 14.6   15.0   15.2     Platelets 253   219   204     Neutrophil Rel %  82.8      Immature Granulocyte Rel %  1.1      Lymphocyte Rel %  9.0      Monocyte Rel %  6.9      Eosinophil Rel %  0.0      Basophil Rel %  0.2          CMP:   Lab Results   Lab 05/03/23  0800 05/04/23  0031 05/04/23  0817 05/05/23  0811   SODIUM 139  --  140 141   POTASSIUM 3.4* 5.0 3.7 3.6   CHLORIDE 94*  --  96* 98   CO2 30.0*  --  32.0* 30.0*   BUN 50*  --  71* 69*   CREATININE 1.34*  --  1.93* 1.81*   CALCIUM 8.1*  --  7.9* 7.8*   BILIRUBIN 0.5  --  0.6 0.5   ALK PHOS 151*  --  214* 208*   ALT (SGPT) 16  --  141* 105*   AST (SGOT) 19  --  198* 83*   GLUCOSE 212*  --  95 69       Estimated Creatinine Clearance: 52.3 mL/min (A) (by C-G formula based on SCr of 1.81 mg/dL (H)).    Culture Results:    Microbiology Results (last 10 days)     Procedure Component Value - Date/Time    Respiratory Culture - Aspirate, ET Suction [[id number]]  (Abnormal)  (Susceptibility) Collected: 04/29/23 1954    Lab Status: Final result Specimen: Aspirate from ET Suction Updated: 05/02/23 0902     Respiratory Culture Light growth (2+) Staphylococcus aureus, MRSA     Comment:   Methicillin resistant Staphylococcus aureus, Patient  may be an isolation risk.         No Normal Respiratory Asmita     Gram Stain Many (4+) WBCs per low power field      Few (2+) Epithelial cells per low power field      Rare (1+) Gram positive cocci    Susceptibility      Staphylococcus aureus, MRSA      STALIN      Clindamycin Resistant     Inducible Clindamycin Resistance Negative      Linezolid Susceptible      Oxacillin Resistant     Tetracycline Resistant     Trimethoprim + Sulfamethoxazole Resistant     Vancomycin Susceptible                           Blood Culture With SIMRAN - Blood, Arm, Left [[id number]]  (Normal) Collected: 04/29/23 1757    Lab Status: Final result Specimen: Blood from Arm, Left Updated: 05/04/23 1831     Blood Culture No growth at 5 days    Blood Culture With SIMRAN - Blood, Hand, Right [[id number]]  (Normal) Collected: 04/29/23 1756    Lab Status: Final result Specimen: Blood from Hand, Right Updated: 05/04/23 1831     Blood Culture No growth at 5 days    MRSA Screen, PCR (Inpatient) - Swab, Nares [[id number]]  (Abnormal) Collected: 04/29/23 1748    Lab Status: Final result Specimen: Swab from Nares Updated: 04/29/23 1849     MRSA PCR MRSA Detected    Narrative:      The negative predictive value of this diagnostic test is high and should only be used to consider de-escalating anti-MRSA therapy. A positive result may indicate colonization with MRSA and must be correlated clinically.    Blood Culture - Blood, Arm, Right [[id number]]  (Normal) Collected: 04/28/23 0130    Lab Status: Final result Specimen: Blood from Arm, Right Updated: 05/03/23 0200     Blood Culture No growth at 5 days    Blood Culture - Blood, Arm, Right [[id number]]  (Normal) Collected: 04/28/23 0100    Lab Status: Final result Specimen: Blood from Arm, Right Updated: 05/03/23 0200     Blood Culture No growth at 5 days               Radiology:         Imaging Results (Last 72 Hours)     Procedure Component Value Units Date/Time    XR Chest 1 View [[id number]] Collected: 05/05/23 0815      Updated: 05/05/23 0903    Narrative:      EXAMINATION: XR CHEST 1 - 5/5/2023 8:58 AM CDT     HISTORY: Intubated and sedated; S06.4X0A-Epidural hemorrhage without  loss of consciousness, initial encounter; Z74.09-Other reduced mobility;  Z98.890-Other specified postprocedural states; T81.42XA-Infection  following a procedure, deep incisional surgical site, initial encounter;  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     COMPARISON: Chest x-ray 05/04/2023 0313 hours.     The tracheostomy tube remains in satisfactory position, there is a new  left-sided PICC, the tip projects over the proximal SVC in satisfactory  position. There is moderate volume loss in the lung bases, with  persistent central vascular congestion and mild pulmonary edema,  slightly improved on the left. The heart remains enlarged. No acute  osseous abnormality is identified. Smooth loose bodies project over the  right glenohumeral joint The upper abdomen is unremarkable.       Impression:      Interval insertion of a left-sided PICC, in satisfactory  position. The tracheostomy tube remains in good position. Persistent but  mildly improved asymmetric pulmonary edema and evidence of CHF.  This report was finalized on 05/05/2023 09:00 by Dr. Elijah Grover MD.    XR Chest 1 View [[id number]] Collected: 05/04/23 0725     Updated: 05/04/23 0730    Narrative:      EXAMINATION: XR CHEST 1 - 5/4/2023 7:25 AM CDT     HISTORY: Intubated and sedated; S06.4X0A-Epidural hemorrhage without  loss of consciousness, initial encounter; Z74.09-Other reduced mobility;  Z98.890-Other specified postprocedural states; T81.42XA-Infection  following a procedure, deep incisional surgical site, initial encounter;  D32.9-Benign neoplasm of meninges, unspecified; E11.65-Type 2 diabetes  mellitus with hyperglycemia;     REPORT: A frontal view of the chest was  obtained.     COMPARISON: Chest x-ray 05/03/2023 0327 hours.     The tracheostomy tube appears in satisfactory position as before. The  lungs remain somewhat hypoaerated, there is diffuse hazy pulmonary  infiltrate/edema and consolidation of the left base, unchanged. The  heart is enlarged. No pneumothorax is identified. A small left pleural  effusion is not excluded. No acute osseous abnormality.       Impression:      Stable 1 day appearance of the chest.  This report was finalized on 05/04/2023 07:27 by Dr. Elijah Grover MD.    XR Chest 1 View [[id number]] Collected: 05/03/23 0729     Updated: 05/03/23 0735    Narrative:      EXAMINATION: XR CHEST 1 VW- 5/3/2023 7:29 AM CDT     HISTORY: Intubated and sedated; S06.4X0A-Epidural hemorrhage without  loss of consciousness, initial encounter; Z74.09-Other reduced mobility;  Z98.890-Other specified postprocedural states; T81.42XA-Infection  following a procedure, deep incisional surgical site, initial encounter;  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     COMPARISON: Chest x-ray 05/02/2023 0308 hours.     The tracheostomy tube appears in satisfactory position as before. There  is respiratory motion artifact, moderate volume loss is present in the  lung bases and there are are extensive bilateral pulmonary infiltrates,  centrally predominant and slightly asymmetric and greater on the right.  Mild cardiomegaly is present. No pneumothorax or large pleural effusion  is identified. The osseous structures are unremarkable.       Impression:      CHF with mild improvement in the extent of pulmonary edema  within the left lung. Stable satisfactory position of the tracheostomy  tube.  This report was finalized on 05/03/2023 07:32 by Dr. Elijah Grover MD.          Assessment & Plan     Active Hospital Problems    Diagnosis    • **Ventricular fibrillation arrest  (5/3/23)    • Shock liver    • BESSIE (acute kidney injury)    • PEA (Pulseless electrical activity) arrest (4/16/23)    • COPD (chronic obstructive pulmonary disease)    • Obesity (BMI 30-39.9)    • Tobacco abuse, in remission    • Acute systolic heart failure    • ADDIE (obstructive sleep apnea)    • Type 2 myocardial infarction due to arrhythmia    • Acute respiratory failure with hypoxia    • Swelling of scalp    • Intracranial epidural hematoma    • Post-operative infection    • Paroxysmal atrial fibrillation with rapid ventricular response    • Type 2 diabetes mellitus with hyperglycemia and peripheral neuropathy, with long-term current use of insulin ([facility])    • Coronary artery disease    • Meningioma s/p craniotomy        IMPRESSION:    · Respiratory culture with MRSA- treated with linezolid from May 1 to May 4 (after treatment with vancomycin originally instituted per pulmonary).  · Leukocytosis-trending down today  · Acute kidney injury-creatinine trending down today.  · Prior cranioplasty infection with Serratia-treated with meropenem followed by levofloxacin via G-tube.  Treatment course completed.  · Status post CODE BLUE with episode of PEA and more recently CODE BLUE with V-fib.  · Diabetes mellitus      RECOMMENDATION:   · Completed levofloxacin for cranioplasty infection with Serratia marcescens  · Completed course of antibiotic therapy initially with vancomycin and Zosyn and then transition to linezolid for MRSA in sputum monitor temperature curve, white blood count, general clinical status.  · Monitor off antibiotic therapy        Discussed with SELENA Sandoval.      Lillian Pike MD  05/05/23  12:51 CDT

## 2023-05-06 NOTE — PROGRESS NOTES
PULMONARY AND CRITICAL CARE PROGRESS NOTE -     Patient: Elijah Escobar    1955    MR# [id number]    Acct# [id number]  05/06/23   09:53 CDT  Referring Provider: Flaco Portillo, *    Chief Complaint: Mechanically ventilated    Interval history: Events noted chart reviewed.  Discussed with nursing staff.  Remains confused not following commands.  Currently on propofol and Precedex drip.  I turned off the propofol drip which was minimal and patient currently tolerating well.  I placed a months continuous breathing trial which he failed.  Currently on 30% FiO2 PEEP of 5.  Meds:  aspirin, 81 mg, Oral, Daily  Chlorhexidine Gluconate Cloth, 1 application, Topical, Q24H  furosemide, 40 mg, Intravenous, Daily  heparin (porcine), 5,000 Units, Subcutaneous, Q12H  insulin lispro, 0-14 Units, Subcutaneous, Q6H  ipratropium, 0.5 mg, Nebulization, 4x Daily - RT  levETIRAcetam, 500 mg, Oral, BID  metoprolol tartrate, 25 mg, Oral, Q12H  multivitamin with minerals, 1 tablet, Oral, Daily  pantoprazole, 40 mg, Intravenous, Q AM  polyethylene glycol, 17 g, Oral, Daily  sennosides-docusate, 1 tablet, Oral, Daily  sodium chloride, 10 mL, Intravenous, Q12H  sodium chloride, 10 mL, Intravenous, Q12H  sodium chloride, 10 mL, Intravenous, Q12H  sodium chloride, 10 mL, Intravenous, Q12H      dexmedetomidine, 0.2-1.5 mcg/kg/hr, Last Rate: 0.8 mcg/kg/hr (05/06/23 0912)  dextrose, 75 mL/hr, Last Rate: 75 mL/hr (05/05/23 2337)  propofol, 5-50 mcg/kg/min, Last Rate: 5 mcg/kg/min (05/05/23 2336)        Ventilator Settings:        Resp Rate (Set): 14  Pressure Support (cm H2O): 10 cm H20  FiO2 (%): 40 %  PEEP/CPAP (cm H2O): 5 cm H20  Minute Ventilation (L/min) (Obs): 6.98 L/min  Resp Rate (Observed) Vent: 29  I:E Ratio (Set): 1:4.45  I:E Ratio (Obs): 1:1.4  PIP Observed (cm H2O): 19 cm H2O  RSBI: 0  Physical Exam:  Temp:  [97 °F (36.1 °C)-97.8 °F (36.6 °C)] 97.8 °F (36.6 °C)  Heart Rate:  [56-88] 68  Resp:  [14-25] 14  BP:  ()/(52-76) 94/53  FiO2 (%):  [40 %] 40 %    Intake/Output Summary (Last 24 hours) at 5/6/2023 0953  Last data filed at 5/6/2023 0800  Gross per 24 hour   Intake 2150.86 ml   Output 1100 ml   Net 1050.86 ml     SpO2 Percentage    05/06/23 0700 05/06/23 0800 05/06/23 0900   SpO2: 99% 99% 97%   Body mass index is 42.44 kg/m².   Physical Exam   Constitutional:       Appearance: He is obese. He is ill-appearing. He appears uncomfortable. Intubated, restrained. No ventilator dyssynchrony.  Not following commands for me  HENT:      Head: Normocephalic. Right scalp wound, well-healed, no redness warmth or drainage     Nose: Nose normal.      Mouth/Throat: trach  Eyes:      General:         Right eye: No discharge.         Left eye: No discharge.   Cardiovascular:      Rate: normal rate.   Pulmonary:      Effort: increased work of breathing      Breath sounds: No wheezing.  Diminished in bases  Abdominal:      General: PEG tube, Abdomen is distended.   Musculoskeletal:      General: Bilateral wrist restraints, SCDs      Right lower leg: Edema      Left lower leg: Edema   Skin:     General: Skin is warm and dry.      Coloration: Skin is not jaundiced or pale.   Neurological:      General: Intubated, opens eyes, does not follow commands    Electronically signed by Gianluca Munoz MD, 5/6/2023, 09:53 CDT      Physician substantive portion: medical decision making    Result Review    Laboratory Data:  Results from last 7 days   Lab Units 05/06/23  0320 05/05/23  0811 05/04/23  0816   WBC 10*3/mm3 15.15* 16.57* 18.47*   HEMOGLOBIN g/dL 9.2* 8.7* 8.6*   PLATELETS 10*3/mm3 174 204 219     Results from last 7 days   Lab Units 05/06/23  0320 05/05/23  0811 05/04/23  0817 05/03/23  0800 05/03/23  0755 05/02/23  0852 05/01/23  0004   SODIUM mmol/L 140 141 140   < >  --    < > 138   POTASSIUM mmol/L 3.6 3.6 3.7   < >  --    < > 3.8   CO2 mmol/L 31.0* 30.0* 32.0*   < >  --    < > 31.0*   BUN mg/dL 71* 69* 71*   < >  --    < > 57*    CREATININE mg/dL 1.62* 1.81* 1.93*   < >  --    < > 1.56*   LACTATE mmol/L  --   --   --   --  6.9*  --   --    CRP mg/dL  --   --   --   --   --   --  12.72*    < > = values in this interval not displayed.     Results from last 7 days   Lab Units 05/04/23  0437 05/03/23  0851 05/03/23  0356   PH, ARTERIAL pH units 7.532* 7.181* 7.559*   PCO2, ARTERIAL mm Hg 41.1 59.8* 41.0   PO2 ART mm Hg 348.0* 59.0* 55.2*   FIO2 % 100 100 30     Microbiology Results (last 10 days)     Procedure Component Value - Date/Time    Respiratory Culture - Aspirate, ET Suction [[id number]]  (Abnormal)  (Susceptibility) Collected: 04/29/23 1954    Lab Status: Final result Specimen: Aspirate from ET Suction Updated: 05/02/23 0902     Respiratory Culture Light growth (2+) Staphylococcus aureus, MRSA     Comment:   Methicillin resistant Staphylococcus aureus, Patient may be an isolation risk.         No Normal Respiratory Asmita     Gram Stain Many (4+) WBCs per low power field      Few (2+) Epithelial cells per low power field      Rare (1+) Gram positive cocci    Susceptibility      Staphylococcus aureus, MRSA      STALIN      Clindamycin Resistant     Inducible Clindamycin Resistance Negative      Linezolid Susceptible      Oxacillin Resistant     Tetracycline Resistant     Trimethoprim + Sulfamethoxazole Resistant     Vancomycin Susceptible                           Blood Culture With SIMRAN - Blood, Arm, Left [[id number]]  (Normal) Collected: 04/29/23 1757    Lab Status: Final result Specimen: Blood from Arm, Left Updated: 05/04/23 1831     Blood Culture No growth at 5 days    Blood Culture With SIMRAN - Blood, Hand, Right [[id number]]  (Normal) Collected: 04/29/23 1756    Lab Status: Final result Specimen: Blood from Hand, Right Updated: 05/04/23 1831     Blood Culture No growth at 5 days    MRSA Screen, PCR (Inpatient) - Swab, Nares [[id number]]  (Abnormal) Collected: 04/29/23 1748    Lab Status: Final result Specimen: Swab from Nares Updated:  04/29/23 1849     MRSA PCR MRSA Detected    Narrative:      The negative predictive value of this diagnostic test is high and should only be used to consider de-escalating anti-MRSA therapy. A positive result may indicate colonization with MRSA and must be correlated clinically.    Blood Culture - Blood, Arm, Right [[id number]]  (Normal) Collected: 04/28/23 0130    Lab Status: Final result Specimen: Blood from Arm, Right Updated: 05/03/23 0200     Blood Culture No growth at 5 days    Blood Culture - Blood, Arm, Right [[id number]]  (Normal) Collected: 04/28/23 0100    Lab Status: Final result Specimen: Blood from Arm, Right Updated: 05/03/23 0200     Blood Culture No growth at 5 days         Recent films:  XR Chest 1 View    Result Date: 5/6/2023  EXAM/TECHNIQUE: XR CHEST 1 VW-  INDICATION: Intubated and sedated; S06.4X0A-Epidural hemorrhage without loss of consciousness, initial encounter; Z74.09-Other reduced mobility; Z98.890-Other specified postprocedural states; T81.42XA-Infection following a procedure, deep incisional surgical site, initial encounter; D32.9-Benign neoplasm of meninges, unspecified; E11.65-Type 2 diabetes mellitus with hyperglycemia; Z79.4-Long term (current) u  COMPARISON: Prior day  FINDINGS:  Tracheostomy tube is in good position. Left-sided PICC with tip overlying the SVC. Cardiac silhouette is enlarged but stable. No significant change in interstitial and airspace opacity throughout the RIGHT lung and within the LEFT lung base. No large pleural effusion or visible pneumothorax. No acute osseous finding.      Impression:  No change in appearance of the chest. This report was finalized on 05/06/2023 07:06 by Dr. Mk Chinchilla MD.    XR Chest 1 View    Result Date: 5/5/2023  EXAMINATION: XR CHEST 1 VW- 5/5/2023 8:58 AM CDT  HISTORY: Intubated and sedated; S06.4X0A-Epidural hemorrhage without loss of consciousness, initial encounter; Z74.09-Other reduced mobility; Z98.890-Other specified  postprocedural states; T81.42XA-Infection following a procedure, deep incisional surgical site, initial encounter; 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  COMPARISON: Chest x-ray 05/04/2023 0313 hours.  The tracheostomy tube remains in satisfactory position, there is a new left-sided PICC, the tip projects over the proximal SVC in satisfactory position. There is moderate volume loss in the lung bases, with persistent central vascular congestion and mild pulmonary edema, slightly improved on the left. The heart remains enlarged. No acute osseous abnormality is identified. Smooth loose bodies project over the right glenohumeral joint The upper abdomen is unremarkable.      Impression: Interval insertion of a left-sided PICC, in satisfactory position. The tracheostomy tube remains in good position. Persistent but mildly improved asymmetric pulmonary edema and evidence of CHF. This report was finalized on 05/05/2023 09:00 by Dr. Elijah Grover MD.     Personal review of imaging: CXR shows pulmonary edema    Pulmonary Assessment:  1. Acute resp failure with hypoxia, requiring mechanical vent, threat to life and bodily function  2. S/p cardiac arrest, with rosc  3. Encephalopathy  4. Metabolic alkalosis  5. Hx nicotine dependency  6. Tracheostomy and PEG tube  7. Pulmonary infiltrate status post antibiotics    Recommend/plan:   · Events noted chart reviewed.  All problems new to me.  Reviewed recommendations from previous pulmonologist  · Mental status remains a big issue.  Will attempt to wean down propofol and use 1 IV sedation agent Precedex.  May need neurology input if no improvement in mental status.  · Failed SBT trial this morning.  Not ready to wean from the vent at this time.  · Still with significant edema we will go ahead and increase Lasix to twice daily  · Discussed with nursing staff  · Prognosis  guarded and noted plans per family to discuss goals of care.  Currently patient DNR      Electronically signed by Gianluca Munoz MD, 5/6/2023, 09:53 CDT

## 2023-05-06 NOTE — PROGRESS NOTES
Infectious Diseases Progress Note    Patient:  Elijah Escobar  YOB: 1955  MRN: [id number]   Admit date: 3/31/2023   Admitting Physician: Flaco Portillo, *  Primary Care Physician: Brianna Martinez APRN    Chief Complaint/Interval History: He remains off antibiotic treatment.  He is without fever.  He is down to 40% FiO2.  Oxygen saturations remaining in the high 90s.  He does not seem to have excessive pulmonary secretions.    Intake/Output Summary (Last 24 hours) at 5/6/2023 1337  Last data filed at 5/6/2023 1200  Gross per 24 hour   Intake 2661.86 ml   Output 1325 ml   Net 1336.86 ml     Allergies: No Known Allergies  Current Scheduled Medications:   aspirin, 81 mg, Oral, Daily  Chlorhexidine Gluconate Cloth, 1 application, Topical, Q24H  furosemide, 20 mg, Intravenous, Q12H  heparin (porcine), 5,000 Units, Subcutaneous, Q12H  insulin lispro, 0-14 Units, Subcutaneous, Q6H  ipratropium, 0.5 mg, Nebulization, 4x Daily - RT  levETIRAcetam, 500 mg, Oral, BID  metoprolol tartrate, 25 mg, Oral, Q12H  multivitamin with minerals, 1 tablet, Oral, Daily  pantoprazole, 40 mg, Intravenous, Q AM  polyethylene glycol, 17 g, Oral, Daily  sennosides-docusate, 1 tablet, Oral, Daily  sodium chloride, 10 mL, Intravenous, Q12H  sodium chloride, 10 mL, Intravenous, Q12H  sodium chloride, 10 mL, Intravenous, Q12H  sodium chloride, 10 mL, Intravenous, Q12H    Current PRN Medications:  •  acetaminophen **OR** acetaminophen **OR** acetaminophen  •  butalbital-acetaminophen-caffeine  •  calcium carbonate  •  dextrose  •  dextrose  •  glucagon (human recombinant)  •  hydrOXYzine  •  ipratropium  •  ipratropium-albuterol  •  ondansetron **OR** ondansetron  •  potassium chloride **OR** potassium chloride **OR** potassium chloride  •  sodium chloride  •  sodium chloride  •  sodium chloride  •  sodium chloride  •  sodium chloride    Review of Systems unobtainable from patient due to mental status    Vital Signs:  BP  "97/69   Pulse 59   Temp 98 °F (36.7 °C) (Oral)   Resp 22   Ht 177.8 cm (70\")   Wt 134 kg (295 lb 12.8 oz)   SpO2 98%   BMI 42.44 kg/m²     Physical Exam  Vital signs - reviewed.  Line/IV site - No erythema, warmth, induration, or tenderness.  Craniotomy site without signs of infection.  Lungs without crackles or wheezes    Lab Results:  CBC:   Results from last 7 days   Lab Units 05/06/23  0320 05/05/23  0811 05/04/23  0816 05/03/23  0800 05/01/23  0004 04/30/23  0819 04/29/23  1756   WBC 10*3/mm3 15.15* 16.57* 18.47* 17.06* 12.05* 13.26* 13.30*   HEMOGLOBIN g/dL 9.2* 8.7* 8.6* 10.1* 9.1* 9.2* 9.0*   HEMATOCRIT % 30.4* 29.0* 28.3* 33.5* 30.1* 29.7* 29.6*   PLATELETS 10*3/mm3 174 204 219 253 281 286 298     BMP:  Results from last 7 days   Lab Units 05/06/23  0320 05/05/23  0811 05/04/23  0817 05/04/23  0031 05/03/23  0800 05/02/23  0852 05/01/23  0004 04/30/23  0819   SODIUM mmol/L 140 141 140  --  139 136 138 136   POTASSIUM mmol/L 3.6 3.6 3.7 5.0 3.4* 3.7 3.8 3.9   CHLORIDE mmol/L 97* 98 96*  --  94* 94* 96* 92*   CO2 mmol/L 31.0* 30.0* 32.0*  --  30.0* 30.0* 31.0* 31.0*   BUN mg/dL 71* 69* 71*  --  50* 56* 57* 53*   CREATININE mg/dL 1.62* 1.81* 1.93*  --  1.34* 1.41* 1.56* 1.41*   GLUCOSE mg/dL 166* 69 95  --  212* 148* 115* 232*   CALCIUM mg/dL 7.4* 7.8* 7.9*  --  8.1* 7.8* 8.0* 7.8*   ALT (SGPT) U/L  --  105* 141*  --  16  --  19 22     Culture Results:   Blood Culture   Date Value Ref Range Status   04/29/2023 No growth at 5 days  Final   04/29/2023 No growth at 5 days  Final     Respiratory Culture   Date Value Ref Range Status   04/29/2023 Light growth (2+) Staphylococcus aureus, MRSA (A)  Final     Comment:       Methicillin resistant Staphylococcus aureus, Patient may be an isolation risk.   04/29/2023 No Normal Respiratory Asmita (A)  Final     Radiology: None  Additional Studies Reviewed: None    Impression:   1.  Previous infection at cranioplasty site-completed antibiotic treatment.  No signs of " persistent infection at present.  2.  Previous treatment for the possibility of possible associated pneumonia-oxygen requirement stable to improving off antibiotic treatment.  3.  Leukocytosis-stable at present  4.  Prior PEA arrest and V-fib arrest.  5.  Diabetes mellitus    Recommendations:   He appears to be stable off antibiotic treatment.  He remains without fever.  Would continue to monitor off antibiotic therapy  Culture if fever or other concerns for infection  Empiric antibiotics if evidence of clinical decline    Edwin Jeffers MD

## 2023-05-06 NOTE — PROGRESS NOTES
Neurosurgery Daily Progress Note    HPI:  Elijah Escobar is a 67 y.o. male with a significant medical history of hypertension, diabetes, hyperlipidemia, seizures, craniotomy for tumor (6/16/2022), craniotomy for washout (7/1/2022), craniectomy (10/4/2022), coronary artery disease, diabetes, erectile dysfunction, GERD, hypertension, hyperlipidemia, tobacco abuse, and obesity.  He presents today with a complaint of a 4-5 days onset of progressively worsening right frontal, temporal, and periorbital edema and associated symptoms to include, but not limited to generalized fatigue, chills, dyspnea, intermittent nausea without vomiting, and states he's felt feverish.  Physical exam findings of neurologically intact with right frontal, temporal, and periorbital swelling.  WBC elevated at 15.  3 to an CRP elevated at 14.75.  Imaging pending.    Assessment:   Past Medical History:   Diagnosis Date   • Brain tumor    • Coronary artery disease    • COVID-19 vaccine series completed     MODERNA X 3; LAST DOSE 3/2022   • Diabetes    • Erectile dysfunction    • GERD (gastroesophageal reflux disease)    • Hypercholesteremia    • Hypertension    • Seizure      Active Hospital Problems    Diagnosis    • **Ventricular fibrillation arrest (5/3/23)    • Shock liver    • BESSIE (acute kidney injury)    • PEA (Pulseless electrical activity) arrest (4/16/23)    • COPD (chronic obstructive pulmonary disease)    • Obesity (BMI 30-39.9)    • Tobacco abuse, in remission    • Acute systolic heart failure    • ADDIE (obstructive sleep apnea)    • Type 2 myocardial infarction due to arrhythmia    • Acute respiratory failure with hypoxia    • Swelling of scalp    • Intracranial epidural hematoma    • Post-operative infection    • Paroxysmal atrial fibrillation with rapid ventricular response    • Type 2 diabetes mellitus with hyperglycemia and peripheral neuropathy, with long-term current use of insulin (HCC)    • Coronary artery disease    •  Meningioma s/p craniotomy      Plan:   Neuro: Intermittently opens eyes to voice.  Does not follow commands.  + Cough and gag.  Intermittent spontaneous movement of head and feet    Infected cranial flap      POD #29 (4/4/2023)  craniectomy and washout    Postop code CT and MRI stable. No acute abnormalities.    Heavy 4+ Serratia marcescens     POD #22 (4/13/2023) evacuation of scalp hematoma    Continue neuro exams per policy.  Call for decline  EEG shows no epileptiform activity or ongoing seizures    11 Days Post-Op (4/25/2023) tracheostomy and PEG tube placement.      CV: Code for PEA, 4/16/2023 and 5/3/2023.     AFib/flutter, rate controlled.   ASA 81mg and resume heparin prophylaxis today.    No evidence of DVT or PE per imaging   Appreciate cardiology  Pulm: Ventilation per trach   Increased pulmonary edema per CXR.    : Condom cath  FEN: Enteral feeding per PEG tube as tolerated  Endocrine: Continue SSI  GI: SHERIF.  +BM  ID:  Infected cranial flap  CSF: no growth to date   PNA.  Resolved   UTI.  Resolved   MRSA +   Antibiotics per ID  Heme:  DVT prophylaxis with SCDs and Heparin  Pain: No complaints at present  Dispo: PT/OT   Palliative care following.  Family has elected to de-escalate to no CPR, no defibrillation.     Chief complaint:   4-5 days onset of progressively worsening right frontal, temporal, and periorbital edema and associated symptoms to include, but not limited to generalized fatigue, chills, dyspnea, intermittent nausea without vomiting, and states he's felt feverish.    HPI  Subjective:  Symptoms:  Stable.      Temp:  [97 °F (36.1 °C)-97.6 °F (36.4 °C)] 97.6 °F (36.4 °C)  Heart Rate:  [56-88] 62  Resp:  [14-25] 14  BP: ()/(52-76) 95/62  FiO2 (%):  [40 %] 40 %    Output by Drain (mL) 05/05/23 0701 - 05/05/23 1900 05/05/23 1901 - 05/06/23 0700 05/06/23 0701 - 05/06/23 0844 Range Total   Requested LDAs do not have output data documented.     Objective:  Vital signs: (most recent): Blood  "pressure 95/62, pulse 62, temperature 97.6 °F (36.4 °C), temperature source Axillary, resp. rate 14, height 177.8 cm (70\"), weight 134 kg (295 lb 12.8 oz), SpO2 99 %.      Neurologic Exam     Mental Status      Intermittently opens eyes to voice.  Does not follow commands.  + Cough and gag.  Intermittent spontaneous movement of head and feet      Cranial Nerves     CN III, IV, VI   Pupils are equal, round, and reactive to light.    CN VII   Right facial weakness: none  Left facial weakness: none    CN IX, X   Right gag reflex: normal  Left gag reflex: normal    Motor Exam   Minimal motor response to pain to painful stimuli.       Drains: * No LDAs found *    Imaging Results (Last 24 Hours)     Procedure Component Value Units Date/Time    XR Chest 1 View [[id number]] Collected: 05/06/23 0705     Updated: 05/06/23 0709    Narrative:      EXAM/TECHNIQUE: XR CHEST 1 VW-     INDICATION: Intubated and sedated; S06.4X0A-Epidural hemorrhage without  loss of consciousness, initial encounter; Z74.09-Other reduced mobility;  Z98.890-Other specified postprocedural states; T81.42XA-Infection  following a procedure, deep incisional surgical site, initial encounter;  D32.9-Benign neoplasm of meninges, unspecified; E11.65-Type 2 diabetes  mellitus with hyperglycemia; Z79.4-Long term (current) u     COMPARISON: Prior day     FINDINGS:     Tracheostomy tube is in good position. Left-sided PICC with tip  overlying the SVC. Cardiac silhouette is enlarged but stable. No  significant change in interstitial and airspace opacity throughout the  RIGHT lung and within the LEFT lung base. No large pleural effusion or  visible pneumothorax. No acute osseous finding.       Impression:         No change in appearance of the chest.  This report was finalized on 05/06/2023 07:06 by Dr. Mk Chinchilla MD.    XR Chest 1 View [[id number]] Collected: 05/05/23 0858     Updated: 05/05/23 0903    Narrative:      EXAMINATION: XR CHEST 1 VW- 5/5/2023 8:58 AM " CDT     HISTORY: Intubated and sedated; S06.4X0A-Epidural hemorrhage without  loss of consciousness, initial encounter; Z74.09-Other reduced mobility;  Z98.890-Other specified postprocedural states; T81.42XA-Infection  following a procedure, deep incisional surgical site, initial encounter;  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     COMPARISON: Chest x-ray 05/04/2023 0313 hours.     The tracheostomy tube remains in satisfactory position, there is a new  left-sided PICC, the tip projects over the proximal SVC in satisfactory  position. There is moderate volume loss in the lung bases, with  persistent central vascular congestion and mild pulmonary edema,  slightly improved on the left. The heart remains enlarged. No acute  osseous abnormality is identified. Smooth loose bodies project over the  right glenohumeral joint The upper abdomen is unremarkable.       Impression:      Interval insertion of a left-sided PICC, in satisfactory  position. The tracheostomy tube remains in good position. Persistent but  mildly improved asymmetric pulmonary edema and evidence of CHF.  This report was finalized on 05/05/2023 09:00 by Dr. Elijah Grover MD.        Lab Results (last 24 hours)     Procedure Component Value Units Date/Time    POC Glucose Once [[id number]]  (Abnormal) Collected: 05/06/23 0535    Specimen: Blood Updated: 05/06/23 0546     Glucose 217 mg/dL      Comment: : bruna Mackenzie [facility] SystemsaMeter ID: [id number]       Basic Metabolic Panel [[id number]]  (Abnormal) Collected: 05/06/23 0320    Specimen: Blood Updated: 05/06/23 0411     Glucose 166 mg/dL      BUN 71 mg/dL      Creatinine 1.62 mg/dL      Sodium 140 mmol/L      Potassium 3.6 mmol/L      Chloride 97 mmol/L      CO2 31.0 mmol/L      Calcium 7.4 mg/dL      BUN/Creatinine Ratio 43.8     Anion Gap 12.0 mmol/L      eGFR 46.2 mL/min/1.73      Narrative:      GFR Normal >60  Chronic Kidney Disease <60  Kidney Failure <15      CBC (No Diff) [[id number]]  (Abnormal) Collected: 05/06/23 0320    Specimen: Blood Updated: 05/06/23 0348     WBC 15.15 10*3/mm3      RBC 3.12 10*6/mm3      Hemoglobin 9.2 g/dL      Hematocrit 30.4 %      MCV 97.4 fL      MCH 29.5 pg      MCHC 30.3 g/dL      RDW 15.3 %      RDW-SD 53.6 fl      MPV 11.9 fL      Platelets 174 10*3/mm3     POC Glucose Once [[id number]]  (Normal) Collected: 05/05/23 2335    Specimen: Blood Updated: 05/05/23 2346     Glucose 129 mg/dL      Comment: : bruna Haro ID: [id number]       POC Glucose Once [[id number]]  (Normal) Collected: 05/05/23 1815    Specimen: Blood Updated: 05/05/23 1827     Glucose 85 mg/dL      Comment: : [id number] Gutierrez ID: [id number]       POC Glucose Once [[id number]]  (Abnormal) Collected: 05/05/23 1731    Specimen: Blood Updated: 05/05/23 1743     Glucose 52 mg/dL      Comment: : sunny Castro ID: [id number]       AFB Culture - Cerebrospinal Fluid, Lumbar Puncture [[id number]] Collected: 03/31/23 1450    Specimen: Cerebrospinal Fluid from Lumbar Puncture Updated: 05/05/23 1530     AFB Culture No AFB isolated at 5 weeks     AFB Stain No acid fast bacilli seen on direct smear    POC Glucose Once [[id number]]  (Normal) Collected: 05/05/23 1145    Specimen: Blood Updated: 05/05/23 1156     Glucose 72 mg/dL      Comment: : sunny Castro ID: [id number]           ADITYA Cespedes

## 2023-05-06 NOTE — PROGRESS NOTES
2/10/2023         RE: Merlyn Ravi  [id number] [facility] [address]        Dear Colleague,    Thank you for referring your patient, Merlyn Ravi, to the [facility]. Please see a copy of my visit note below.                                                            - Endocrinology Follow up -    Reason for visit/consult:  Hypothyrodism, fatiue    Primary care provider: Memo Garcia    Assessment and Plan  A 57 year old female with hypothyroidism, came with fatigue for a few years and recently getting worse,     # Hashimoto thryoiditis  Increased in 11/2018) levothyroxien from 75 mcg to 88 mcg, since then she feels better.     - TSH, free T4 today    - meanwhile continue current dose of levothryoxine 88 mcg    # Breast CA on the left  Diagnosed 2019, underwent lumpectomy, followed by RT. Currently taking Tamoxifen. Hot flush is getting milder.       # Family history of DM1   Her brother has DM1 since age 14.       # Bone health  Hysterectomy, followed by hormone replacement which was stopped 5 year ago.   She was recently diagnosed braest CA, Currently taking Tamoxifen.     - Calcium citrate plus D (elemental Ca 630 mg, VitD 500 international unit(s)) to make sure to take.       RTC with me in 1 year         30   minutes spent on the date of the encounter doing chart review, history and exam, documentation and further activities as noted above.    Gala Pop MD  Staff Physician  Endocrinology and Metabolism  [facility]  License: [id number]  Pager: [phone]    Interval History as of 2/10/2023 : Patient has been doing well . Medication compliance: excellent   . New event includes: no pertinent medical event noted, but it was hard for her family loss .  Interval History as of 3/9/2020 : She was recently diagnosed braest CA, Currently taking Tamoxifen. Compliant to levothryoxine 88 mcg.   Interval History as of 3/9/2020 : Patient      [facility] Medicine Services  INPATIENT PROGRESS NOTE    Patient Name: Elijah Escobar  Date of Admission: 3/31/2023  Today's Date: 05/06/23  Length of Stay: 36  Primary Care Physician: Brianna Martinez APRN    Subjective   Chief Complaint: Respiratory failure/cardiac arrest/renal failure/brain bleed    HPI   Adjustment of ventilator is done by pulmonary.  Cut back Lasix to 20 twice daily, stop IV fluids.  Patient tolerating tube feeding.  Blood pressure still in the low side.  Leukocytosis improving.  Hemoglobin increased.  Metabolic acidosis resolved.  Creatinine is also improving.  Calcium level stable.    Review of Systems   Unable to assess secondary to the patient's intubated and sedated state.   All pertinent negatives and positives are as above. All other systems have been reviewed and are negative unless otherwise stated.     Objective    Temp:  [97 °F (36.1 °C)-97.8 °F (36.6 °C)] 97.8 °F (36.6 °C)  Heart Rate:  [56-88] 75  Resp:  [14-25] 14  BP: ()/(52-80) 102/64  FiO2 (%):  [40 %] 40 %  Physical Exam  Vitals and nursing note reviewed.   HENT:      Head: Normocephalic.   Eyes:      Conjunctiva/sclera: Conjunctivae normal.   Neck:      Vascular: No JVD.   Cardiovascular:      Rate and Rhythm: Normal rate. Rhythm irregular.      Heart sounds: Normal heart sounds.   Pulmonary:      Effort: No respiratory distress.      Breath sounds: Rhonchi present. No wheezing or rales.      Comments: Slightly rhonchi bilateral.  Ventilated, trach in place.  Chest:      Chest wall: No tenderness.   Abdominal:      General: Bowel sounds are normal. There is no distension.      Palpations: Abdomen is soft.      Tenderness: There is no abdominal tenderness.      Comments: Morbid obesity   Musculoskeletal:         General: No tenderness or deformity.      Cervical back: Neck supple.      Right lower leg: Edema present.      Left lower leg: Edema present.      Comments: +2 pitting  edema bilateral lower extremities   Skin:     General: Skin is warm and dry.      Findings: No rash.   Neurological:      Cranial Nerves: No cranial nerve deficit.      Motor: Weakness present. No abnormal muscle tone.      Gait: Gait abnormal.      Deep Tendon Reflexes: Reflexes normal.             Results Review:  I have reviewed the labs, radiology results, and diagnostic studies.    Laboratory Data:   Results from last 7 days   Lab Units 05/06/23  0320 05/05/23  0811 05/04/23  0816   WBC 10*3/mm3 15.15* 16.57* 18.47*   HEMOGLOBIN g/dL 9.2* 8.7* 8.6*   HEMATOCRIT % 30.4* 29.0* 28.3*   PLATELETS 10*3/mm3 174 204 219        Results from last 7 days   Lab Units 05/06/23 0320 05/05/23  0811 05/04/23  0817 05/04/23  0031 05/03/23  0800   SODIUM mmol/L 140 141 140  --  139   POTASSIUM mmol/L 3.6 3.6 3.7   < > 3.4*   CHLORIDE mmol/L 97* 98 96*  --  94*   CO2 mmol/L 31.0* 30.0* 32.0*  --  30.0*   BUN mg/dL 71* 69* 71*  --  50*   CREATININE mg/dL 1.62* 1.81* 1.93*  --  1.34*   CALCIUM mg/dL 7.4* 7.8* 7.9*  --  8.1*   BILIRUBIN mg/dL  --  0.5 0.6  --  0.5   ALK PHOS U/L  --  208* 214*  --  151*   ALT (SGPT) U/L  --  105* 141*  --  16   AST (SGOT) U/L  --  83* 198*  --  19   GLUCOSE mg/dL 166* 69 95  --  212*    < > = values in this interval not displayed.       Culture Data:   Blood Culture   Date Value Ref Range Status   04/29/2023 No growth at 5 days  Final   04/29/2023 No growth at 5 days  Final     Respiratory Culture   Date Value Ref Range Status   04/29/2023 Light growth (2+) Staphylococcus aureus, MRSA (A)  Final     Comment:       Methicillin resistant Staphylococcus aureus, Patient may be an isolation risk.   04/29/2023 No Normal Respiratory Asmita (A)  Final       Radiology Data:   Imaging Results (Last 24 Hours)     Procedure Component Value Units Date/Time    XR Chest 1 View [[id number]] Collected: 05/06/23 0705     Updated: 05/06/23 0709    Narrative:      EXAM/TECHNIQUE: XR CHEST 1 VW-     INDICATION:  has been doing well. Last seen . Medication compliance   . New event includes  .feelign good.   Interval History: Recently increased (3 month ago 11/2018) levothyroxien from 75 mcg to 88 mcg, patient energy level up and feeling better.   Blood work today euthryoid.     HPI: A 51 yo female with monoclonal gammopathy of unknown significance, stable, incidentally found upon plasma transfusion donation, here for the second follow up of her hypothryoidism. She had a history of HRT since 1998, due to total hysterectomy. HRT stopped January 2016.   (upate)  She has been doing well. She lost 20 lb over 1 year, no more fatigue, hair started to grow, no dry skin. Medication compliance is excellent.       1/5/2016 3/10/2016 12:51 10/6/2016 10:10 12/29/2016 12:20 4/27/2017 11:38 12/6/2017 10:11   TSH  0.4-4.0 8.08 4.99 (H) 5.96 (H) 0.01 (L) 1.90 2.99   T4 Free  0.76-1.46 0.85 0.82 0.89 1.58 (H) 1.01 1.13        11/16/2018 15:54 1/21/2019 09:16   TSH 3.38 0.46   T4 Free 1.02 1.11       Past Medical/Surgical History:  Past Medical History:   Diagnosis Date     Allergies      Breast cancer (H)      Dermatofibroma 05/14/2012     Need for prophylactic hormone replacement therapy (postmenopausal)      PONV (postoperative nausea and vomiting) 7/7/2022     Past Surgical History:   Procedure Laterality Date     ARTHROSCOPY KNEE Left 7/14/2022    Procedure: ARTHROSCOPY, LEFT KNEE with meniscal and chondral debridement;  Surgeon: Timo Berman MD;  Location: [facility] OR     BIOPSY BREAST       HYSTERECTOMY, PAP NO LONGER INDICATED       HYSTERECTOMY, MAGY       LASIK BILATERAL  01/01/2005    Dr. Solis      LUMPECTOMY BREAST Left 09/30/2019    Procedure: Re-excision of left breast lumpectomy site;  Surgeon: Kj Salas DO;  Location: [facility]     LUMPECTOMY BREAST Left 10/09/2019    Procedure: Left breast re-excision;  Surgeon: Kj Salas DO;  Location: [facility] OR     LUMPECTOMY BREAST WITH SENTINEL NODE, COMBINED Left  09/23/2019    Procedure: LUMPECTOMY, BREAST, WITH SENTINEL LYMPH NODE BIOPSY.;  Surgeon: Kj Salas DO;  Location: [facility] OR     RUST NONSPECIFIC PROCEDURE      bladder surgery       Allergies:  Allergies   Allergen Reactions     Demerol      Sedation and vomiting     Meperidine      Sulfa Drugs      unknown reaction       Current Medications   Current Outpatient Medications   Medication     atorvastatin (LIPITOR) 10 MG tablet     BIOTIN MAXIMUM PO     calcium 500-125 MG-UNIT TABS     doxycycline hyclate (PERIOSTAT) 20 MG tablet     gabapentin (NEURONTIN) 100 MG capsule     levothyroxine (SYNTHROID/LEVOTHROID) 88 MCG tablet     metroNIDAZOLE (METROCREAM) 0.75 % external cream     tamoxifen (NOLVADEX) 20 MG tablet     tolterodine ER (DETROL LA) 4 MG 24 hr capsule     venlafaxine (EFFEXOR XR) 75 MG 24 hr capsule     Current Facility-Administered Medications   Medication     methylPREDNISolone (DEPO-MEDROL) injection 80 mg       Family History:  Family History   Problem Relation Age of Onset     Thyroid Disease Mother      Heart Disease Mother      Heart Disease Father      Hyperlipidemia Father      Hypertension Maternal Grandmother      Breast Cancer Maternal Grandmother      Hypertension Maternal Grandfather      Alzheimer Disease Paternal Grandmother      Diabetes Brother      Eczema Brother      Thyroid Disease Brother      Melanoma No family hx of    Mother hyperthyrodism- removed goiter, Borhter hyperthyroidism- with medication, cousin- hyperthyrodism    Social History:  Social History     Tobacco Use     Smoking status: Never     Smokeless tobacco: Never   Substance Use Topics     Alcohol use: Yes     Alcohol/week: 0.0 standard drinks     Comment: 3 drinks per month   Lives  with 2 kids. Work at [employer] ([employer]    ROS:  Full review of systems taken with the help of the intake sheet. Pertinent positives include fatigue, loss of energy, weight gain, hair loss. Otherwise a complete  Intubated and sedated; S06.4X0A-Epidural hemorrhage without  loss of consciousness, initial encounter; Z74.09-Other reduced mobility;  Z98.890-Other specified postprocedural states; T81.42XA-Infection  following a procedure, deep incisional surgical site, initial encounter;  D32.9-Benign neoplasm of meninges, unspecified; E11.65-Type 2 diabetes  mellitus with hyperglycemia; Z79.4-Long term (current) u     COMPARISON: Prior day     FINDINGS:     Tracheostomy tube is in good position. Left-sided PICC with tip  overlying the SVC. Cardiac silhouette is enlarged but stable. No  significant change in interstitial and airspace opacity throughout the  RIGHT lung and within the LEFT lung base. No large pleural effusion or  visible pneumothorax. No acute osseous finding.       Impression:         No change in appearance of the chest.  This report was finalized on 05/06/2023 07:06 by Dr. Mk Chinchilla MD.          I have reviewed the patient's current medications.     Assessment/Plan   Assessment  Active Hospital Problems    Diagnosis    • **Ventricular fibrillation arrest (5/3/23)    • Shock liver    • BESSIE (acute kidney injury)    • PEA (Pulseless electrical activity) arrest (4/16/23)    • COPD (chronic obstructive pulmonary disease)    • Obesity (BMI 30-39.9)    • Tobacco abuse, in remission    • Acute systolic heart failure    • ADDIE (obstructive sleep apnea)    • Type 2 myocardial infarction due to arrhythmia    • Acute respiratory failure with hypoxia    • Swelling of scalp    • Intracranial epidural hematoma    • Post-operative infection    • Paroxysmal atrial fibrillation with rapid ventricular response    • Type 2 diabetes mellitus with hyperglycemia and peripheral neuropathy, with long-term current use of insulin (HCC)    • Coronary artery disease    • Meningioma s/p craniotomy        Treatment Plan  [facility] . the patient was admitted on 3/31 by Dr. Portillo.  He has been in the hospital for well over a month at this  "14 point review of systems was taken and is negative unless stated in the history above.      Physical Exam:   Blood pressure 112/84, pulse 83, height 1.672 m (5' 5.83\"), weight 94.1 kg (207 lb 9 oz)  General: well appearing, no acute distress, pleasant and conversant,   Mental Status/neuro: alert and oriented  Face: symmetrical, normal facial color  Eyes: anicteric, PERRL, no proptosis or lid lag  Neck: suppler, no lymphadenopahty  Thyroid: normal size and texture, no nodule palpable, no bruits  Resp: breathing normally  Legs: no swelling or edema        Again, thank you for allowing me to participate in the care of your patient.        Sincerely,        Gala Pop MD    " point.  He has a history of craniotomy, craniectomy.  He has had 2 separate episodes of this becoming infected.  He on this hospitalization has undergone repeat craniotomy, has been on prolonged antibiotic therapy.  He suffered a PEA arrest on 4/16.  He underwent tracheostomy and PEG tube placement on 4/25. He developed ventricular fibrillation leading to a cardiac arrest on the morning of 5/3.     Respiratory failure hypoxia/COPD/obstructive sleep apnea.  Pulmonary consult.  Leukocytosis improving.  Atrovent nebs.  Precedex drip.  Propofol drip.  Status post tracheotomy 4/25/23.  Chest x-ray-Tracheostomy tube is in good position, Left-sided PICC with tip overlying the SVC,  Cardiac silhouette is enlarged but stable, No significant change in interstitial and airspace opacity throughout the RIGHT lung and within the LEFT lung base, No large pleural effusion or  visible pneumothorax, No acute osseous finding.  Ventilator-assist-control, FiO2 40%, PEEP 5, tidal volume 550, rate 24.    Epidural hematoma.  Postop infection.  Status post meningioma craniotomy.  Fioricet as needed.  Keppra.    Cardiac arrest-PEA/atrial fibs/acute systolic heart failure/CAD.  Status post resuscitation 5/3/2020 3-3 episode of defibrillation and IV amiodarone and bicarb and magnesium.  Possible QT progression from Levaquin antibiotics-stop . cut back Lasix for now, stop IV fluids.  Consult cardiology-diltiazem and digoxin was stopped.  Metoprolol was transitioned to Toprol-XL, unable due to administration of NG-unable to crush.   Aspirin . IV Lasix.  Prophylaxis heparin.  Lopressor.  Echocardiogram- ejection fraction 21 to 25%, mildly reduced right ventricle systolic function.    Metabolic acidosis postarrest.  Resolved.    Shock liver.  Lab in AM.    Diabetes..  Sugars been on the low side.  Hemoglobin A1 C 11.2 in April.  Sliding scale.    Acute renal failure.  Baseline creatinine 1.2.  Creatinine is improving.  Stop D5 water.    Anemia.   Slight increase in hemoglobin.  No sign of acute bleed.    Reflux.  Protonix.  Zofran as needed.    Constipation.  MiraLAX.  Hannah-Colace.    Hypocalcemia.     Nutrition . residual from tube feeding.  No bowel movement.  Status post PEG tube placement 4/25/2023.  Multivitamins.  Glucerna.    Palliative care consult.  DNR.    Medical Decision Making  Number and Complexity of problems: Respiratory failure with hypoxia/obstructive sleep apnea/cardiac arrest/epidural hematoma  Differential Diagnosis: None    Conditions and Status       Critical .     MDM Data  External documents reviewed: Previous note .  Cardiac tracing (EKG, telemetry) interpretation: Irregular, rate control  Radiology interpretation: Chest x-ray  Labs reviewed: Laboratory  Any tests that were considered but not ordered: Lab in a.m.     Decision rules/scores evaluated (example [id number], Wells, etc): None.     Discussed with: Nursing .     Care Planning  Shared decision making: Nursing .  Code status and discussions: DNR    Disposition  Social Determinants of Health that impact treatment or disposition: Unknown  3 to 6 days.    Electronically signed by Adelso Chavez MD, 05/06/23, 10:05 CDT.

## 2023-05-06 NOTE — PLAN OF CARE
Goal Outcome Evaluation:              Outcome Evaluation: Unable to follow commands. Pulm attempted spont trial without success. TF at goal

## 2023-05-07 NOTE — THERAPY DISCHARGE NOTE
Acute Care - Physical Therapy Discharge Summary  [facility]       Patient Name: Elijah Escobar  : 1955  MRN: [id number]    Today's Date: 2023                 Admit Date: 3/31/2023      PT Recommendation and Plan    Visit Dx:    ICD-10-CM ICD-9-CM   1. Intracranial epidural hematoma  S06.4X0A 852.40   2. Impaired mobility  Z74.09 799.89   3. S/P craniotomy  Z98.890 V45.89   4. Infection of deep incisional surgical site after procedure, initial encounter  T81.42XA 998.59   5. Meningioma s/p craniotomy  D32.9 225.2   6. Type 2 diabetes mellitus with hyperglycemia and peripheral neuropathy, with long-term current use of insulin (HCC)  E11.65 250.00    Z79.4 790.29     V58.67   7. Paroxysmal atrial fibrillation with rapid ventricular response  I48.0 427.31   8. Swelling of scalp  R22.0 784.2   9. Acute pulmonary edema due to A-fib RVR  J81.0 518.4   10. Acute respiratory failure with hypoxia  J96.01 518.81   11. Type 2 myocardial infarction due to arrhythmia  I49.9 427.9    I21.A1 410.90   12. Obesity (BMI 30-39.9)  E66.9 278.00   13. Tobacco abuse, in remission  F17.201 305.1   14. Acute systolic heart failure  I50.21 428.21   15. ADDIE (obstructive sleep apnea)  G47.33 327.23   16. Secondary hypertension  I15.9 405.99   17. Gastroesophageal reflux disease, unspecified whether esophagitis present  K21.9 530.81   18. Class 2 severe obesity due to excess calories with serious comorbidity and body mass index (BMI) of 38.0 to 38.9 in adult  E66.01 278.01    Z68.38 V85.38   19. Leukocytosis, unspecified type  D72.829 288.60   20. Anemia due to other cause, not classified  D64.89 285.8   21. Smoker  F17.200 305.1   22. History of cranioplasty  Z98.890 V45.89   23. Facial edema  R60.0 782.3   24. Coronary artery disease due to lipid rich plaque  I25.10 414.00    I25.83 414.3   25. Postoperative infection, unspecified type, initial encounter  T81.40XA 998.59   26. Chronic obstructive pulmonary disease, unspecified  COPD type  J44.9 496   27. Respiratory insufficiency  R06.89 786.09   28. Cardiac arrest  I46.9 427.5                PT Rehab Goals     Row Name 23 1500             ROM Goal 1 (PT)    Progress/Outcome (ROM Goal 1, PT) goal not met  pt SIGNED OFF ON 23. DID NOT DO D/C  -JUAN         Problem Specific Goal 1 (PT)    Progress/Outcome (Problem Specific Goal 1, PT) other (see comments)  Do Not know. P.T. signed off on 23  -JUAN            User Key  (r) = Recorded By, (t) = Taken By, (c) = Cosigned By    Initials Name Provider Type Discipline    Carrie Clark, PTA Physical Therapist Assistant PT                    PT Discharge Summary  Anticipated Discharge Disposition (PT): other (see comments) ()  Reason for Discharge: Patient   Outcomes Achieved:  ()  Discharge Destination: other (comment) ()      Carrie Callaway PTA   2023

## 2023-05-07 NOTE — PROGRESS NOTES
RT EQUIPMENT DEVICE RELATED - SKIN ASSESSMENT    RT Medical Equipment/Device:     Tracheostomy    Skin Assessment:      Neck:  Intact    Device Skin Pressure Protection:  Skin-to-device areas padded:  Optifoam    Nurse Notification:  Kylie Hughes, CRT

## 2023-05-07 NOTE — PLAN OF CARE
Goal Outcome Evaluation:  Plan of Care Reviewed With: son        Progress: declining     Pt experienced respiratory declines beginning about 0430. RT x 3 at bedside, attempting interventions without success. Pulmonary and ENT called for input. Interventions attempted. Son was called around 0540 to come to bedside. He arrived and agreed to switch patient to comfort care. Dr. Banerjee called and orders entered by SELENA Horne charge nurse. Ervin with RT proceeded with orders following morphine administration by SELENA Gibbons. Pt was  at 0623. Family at bedside.

## 2023-05-08 NOTE — PAYOR COMM NOTE
"REF   [id number]    [facility]  CLEM,   [phone]  OR  FAX   [phone]     Virgil Escobar (Dcsd. Male)     Date of Birth   1955    Social Security Number       Address   [address] [place] [id number]    Home Phone   [phone]    MRN   [id number]       Orthodox   Non-Episcopal    Marital Status                               Admission Date   3/31/23    Admission Type   Urgent    Admitting Provider   Flaco Portillo MD    Attending Provider       Department, Room/Bed   [facility], C001/1       Discharge Date   2023    Discharge Disposition       Discharge Destination                               Attending Provider: (none)   Allergies: No Known Allergies    Isolation: None   Infection: MRSA (23)   Code Status: Prior    Ht: 177.8 cm (70\")   Wt: 127 kg (280 lb 8 oz)    Admission Cmt: None   Principal Problem: Ventricular fibrillation arrest (5/3/23) [I49.01]                 Active Insurance as of 3/31/2023     Primary Coverage     Payor Plan Insurance Group Employer/Plan Group    ANTHEM MEDICARE REPLACEMENT ANTHEM MEDICARE ADVANTAGE [id number]     Payor Plan Address Payor Plan Phone Number Payor Plan Fax Number Effective Dates    [address] [phone]  2022 - None Entered    [facility] [address]       Subscriber Name Subscriber Birth Date Member ID       VIRGIL ESCOBAR 1955 [id number]                 Emergency Contacts      (Rel.) Home Phone Work Phone Mobile Phone    Cathy Guzmán (Son) -- -- [phone]    Manju Lua (Relative) -- -- [phone]    ESCOBARMAYTE CARO (Spouse) [phone] -- --    david lua (Sister) [phone] -- --    josie benavides (Sister) -- -- [phone]    Jeevan Regan [phone] -- --        PATIENT  2023    Edwige Olsen, RN   Registered Nurse  Nursing  Plan of Care      Signed  Date of Service:  23 0729  Creation Time:  23 " 0729     Signed          Goal Outcome Evaluation:  Plan of Care Reviewed With: son  Progress: declining  Pt experienced respiratory declines beginning about 0430. RT x 3 at bedside, attempting interventions without success. Pulmonary and ENT called for input. Interventions attempted. Son was called around 0540 to come to bedside. He arrived and agreed to switch patient to comfort care. Dr. Banerjee called and orders entered by SELENA Horne charge nurse. Ervin with RT proceeded with orders following morphine administration by SELENA Gibbons. Pt was  at 0623. Family at bedside.

## 2023-05-12 LAB
MYCOBACTERIUM SPEC CULT: NORMAL
NIGHT BLUE STAIN TISS: NORMAL

## (undated) DEVICE — CATH IV ANGIO FEP 12G 3IN LTBLU 10PK

## (undated) DEVICE — ANTIBACTERIAL VIOLET BRAIDED (POLYGLACTIN 910), SYNTHETIC ABSORBABLE SUTURE: Brand: COATED VICRYL

## (undated) DEVICE — GLV SURG SENSICARE W/ALOE PF LF 7.5 STRL

## (undated) DEVICE — PIN SKULL A/ W/PROTECT CAP PK/3

## (undated) DEVICE — RESERVOIR,SUCTION,100CC,SILICONE: Brand: MEDLINE

## (undated) DEVICE — TBG PENCL TELESCP MEGADYNE SMOKE EVAC 10FT

## (undated) DEVICE — VAGINAL PREP TRAY: Brand: MEDLINE INDUSTRIES, INC.

## (undated) DEVICE — ADHS SKIN PREMIERPRO EXOFIN TOPICAL HI/VISC .5ML

## (undated) DEVICE — SUT SILK 2/0 FS BLK 18IN 685G

## (undated) DEVICE — 3M™ STERI-DRAPE™ CRANIOTOMY DRAPE WITH IOBAN™ 2 INCISE POUCH 6687: Brand: STERI-DRAPE™ IOBAN™ 2

## (undated) DEVICE — APPL DURAPREP IODOPHOR APL 26ML

## (undated) DEVICE — CLTH CLENS READYCLEANSE PERI CARE PK/5

## (undated) DEVICE — BAPTIST TURNOVER KIT: Brand: MEDLINE INDUSTRIES, INC.

## (undated) DEVICE — INTENDED FOR TISSUE SEPARATION, AND OTHER PROCEDURES THAT REQUIRE A SHARP SURGICAL BLADE TO PUNCTURE OR CUT.: Brand: BARD-PARKER ® STAINLESS STEEL BLADES

## (undated) DEVICE — HDRST POSITIONING FM RND 2X9IN

## (undated) DEVICE — SUT MNCRYL 4/0 PS2 27IN UD MCP426H

## (undated) DEVICE — SUT NUROLON 4/0 TF18 CR8 I8IN C584D

## (undated) DEVICE — DRSNG GZ PETROLTM CURAD 3X9IN STRL

## (undated) DEVICE — CODMAN® SURGICAL STRIPS 1" X 6" (25MM X 152MM): Brand: CODMAN®

## (undated) DEVICE — 1.7MM PRECISION NEURO (MATCH HEAD)

## (undated) DEVICE — TRAP FLD MINIVAC MEGADYNE 100ML

## (undated) DEVICE — GLV SURG DERMASSURE GRN LF PF 8.0

## (undated) DEVICE — GLV SURG BIOGEL LTX PF 7 1/2

## (undated) DEVICE — SYR SLP TP 10ML DISP

## (undated) DEVICE — HERMETIC™ LUMBAR CATHETER OPEN TIP: Brand: HERMETIC™

## (undated) DEVICE — ELECTRD BLD EZ CLN MOD XLNG 2.75IN

## (undated) DEVICE — TUBING, SUCTION, 1/4" X 12', STRAIGHT: Brand: MEDLINE

## (undated) DEVICE — PK CRANI 30

## (undated) DEVICE — SPHR MARKR LOCATION CI SYS REFL 3PK 30BX

## (undated) DEVICE — Device

## (undated) DEVICE — CONN FLX BREATHE CIRCT

## (undated) DEVICE — GLV SURG BIOGEL LTX PF 6 1/2

## (undated) DEVICE — DRSNG SURESITE123 8X12IN

## (undated) DEVICE — TOTAL TRAY, 16FR 10ML SIL FOLEY, URN: Brand: MEDLINE

## (undated) DEVICE — BNDR ABD 4PANEL 12IN 63 TO 74IN

## (undated) DEVICE — SUT VIC 0 MO4 CR8 18IN VCP701D

## (undated) DEVICE — SCRWDRVR SMARTO BATRY/PWR TORQ/45NCM 210RPM DISP STRL

## (undated) DEVICE — PK TURNOVER RM ADV

## (undated) DEVICE — SUT ETHIB 0 MO7 18IN CX41D

## (undated) DEVICE — RETR STAY ELNG BLNT 12MM CA/PK/4

## (undated) DEVICE — 3.0MM PRECISION NEURO (MATCH HEAD)

## (undated) DEVICE — DRSNG GZ CURAD XEROFORM NONADHS 5X9IN STRL

## (undated) DEVICE — 2.3MM TAPERED ROUTER

## (undated) DEVICE — PROXIMATE RH ROTATING HEAD SKIN STAPLERS (35 REGULAR) CONTAINS 35 STAINLESS STEEL STAPLES: Brand: PROXIMATE

## (undated) DEVICE — KT PEG ENDOVIVE ENFIT SFTY PULL 20F 1P/U

## (undated) DEVICE — DRN JP FLT NO TROC SIL FUL/PERF 7MM

## (undated) DEVICE — 4-PORT MANIFOLD: Brand: NEPTUNE 2

## (undated) DEVICE — SPONGE,NEURO,0.5"X3",XR,STRL,LF,10/PK: Brand: MEDLINE

## (undated) DEVICE — SUT SILK 3/0 SUTUPAK TIES 24IN SA74H

## (undated) DEVICE — ANTIBACTERIAL UNDYED BRAIDED (POLYGLACTIN 910), SYNTHETIC ABSORBABLE SUTURE: Brand: COATED VICRYL

## (undated) DEVICE — ACCUDRAIN® EXTERNAL CSF DRAINAGE SYSTEM: Brand: ACCUDRAIN®

## (undated) DEVICE — TRY L/P SAFE/T STD A/ 20G 3.5IN

## (undated) DEVICE — BANDAGE,GAUZE,BULKEE II,4.5"X4.1YD,STRL: Brand: MEDLINE

## (undated) DEVICE — COVER,MAYO STAND,STERILE: Brand: MEDLINE

## (undated) DEVICE — DRSNG TRACH POLYMEM FEN 3.5X3.5IN

## (undated) DEVICE — CLIPAPPLR SCLP HEMO PRELD 1P/U STRL: Type: IMPLANTABLE DEVICE | Site: SCALP | Status: NON-FUNCTIONAL

## (undated) DEVICE — PK ENT HD AND NK 30

## (undated) DEVICE — DURAHOOK 1/4HOOK PK/6

## (undated) DEVICE — GLV SURG BIOGEL M LTX PF 7 1/2

## (undated) DEVICE — SUT VIC 3/0 CT1 CR8 18IN VCP738D